# Patient Record
Sex: FEMALE | Race: BLACK OR AFRICAN AMERICAN | NOT HISPANIC OR LATINO | Employment: OTHER | URBAN - METROPOLITAN AREA
[De-identification: names, ages, dates, MRNs, and addresses within clinical notes are randomized per-mention and may not be internally consistent; named-entity substitution may affect disease eponyms.]

---

## 2017-09-13 ENCOUNTER — OFFICE VISIT (OUTPATIENT)
Dept: FAMILY MEDICINE | Facility: CLINIC | Age: 67
End: 2017-09-13
Payer: MEDICARE

## 2017-09-13 VITALS
OXYGEN SATURATION: 98 % | WEIGHT: 152.75 LBS | RESPIRATION RATE: 18 BRPM | DIASTOLIC BLOOD PRESSURE: 62 MMHG | HEART RATE: 52 BPM | SYSTOLIC BLOOD PRESSURE: 132 MMHG | TEMPERATURE: 99 F | BODY MASS INDEX: 24.55 KG/M2 | HEIGHT: 66 IN

## 2017-09-13 DIAGNOSIS — N95.9 MENOPAUSAL AND PERIMENOPAUSAL DISORDER: ICD-10-CM

## 2017-09-13 DIAGNOSIS — M17.0 OSTEOARTHRITIS OF BOTH KNEES, UNSPECIFIED OSTEOARTHRITIS TYPE: ICD-10-CM

## 2017-09-13 DIAGNOSIS — Z85.038 HISTORY OF COLON CANCER: ICD-10-CM

## 2017-09-13 DIAGNOSIS — I10 ESSENTIAL HYPERTENSION: ICD-10-CM

## 2017-09-13 DIAGNOSIS — Z00.00 ANNUAL PHYSICAL EXAM: Primary | ICD-10-CM

## 2017-09-13 PROCEDURE — 99214 OFFICE O/P EST MOD 30 MIN: CPT | Mod: PBBFAC,PN | Performed by: FAMILY MEDICINE

## 2017-09-13 PROCEDURE — 3075F SYST BP GE 130 - 139MM HG: CPT | Mod: ,,, | Performed by: FAMILY MEDICINE

## 2017-09-13 PROCEDURE — 3078F DIAST BP <80 MM HG: CPT | Mod: ,,, | Performed by: FAMILY MEDICINE

## 2017-09-13 PROCEDURE — 99999 PR PBB SHADOW E&M-EST. PATIENT-LVL IV: CPT | Mod: PBBFAC,,, | Performed by: FAMILY MEDICINE

## 2017-09-13 PROCEDURE — 99205 OFFICE O/P NEW HI 60 MIN: CPT | Mod: S$PBB,,, | Performed by: FAMILY MEDICINE

## 2017-09-13 RX ORDER — GABAPENTIN 100 MG/1
100 CAPSULE ORAL 2 TIMES DAILY
COMMUNITY
End: 2018-02-22 | Stop reason: SDUPTHER

## 2017-09-13 RX ORDER — TRAMADOL HYDROCHLORIDE 50 MG/1
50 TABLET ORAL EVERY 6 HOURS PRN
Status: ON HOLD | COMMUNITY
End: 2018-01-08 | Stop reason: HOSPADM

## 2017-09-13 RX ORDER — ONDANSETRON HYDROCHLORIDE 8 MG/1
TABLET, FILM COATED ORAL EVERY 8 HOURS PRN
COMMUNITY
End: 2018-01-18

## 2017-09-13 RX ORDER — TALC
POWDER (GRAM) TOPICAL DAILY
Status: ON HOLD | COMMUNITY
End: 2018-01-08 | Stop reason: HOSPADM

## 2017-09-13 RX ORDER — TRAZODONE HYDROCHLORIDE 50 MG/1
50 TABLET ORAL NIGHTLY PRN
COMMUNITY
End: 2018-01-18

## 2017-09-13 RX ORDER — ESCITALOPRAM OXALATE 10 MG/1
10 TABLET ORAL DAILY
COMMUNITY
End: 2017-10-12

## 2017-09-13 RX ORDER — NYSTATIN 100000 [USP'U]/ML
SUSPENSION ORAL 4 TIMES DAILY PRN
COMMUNITY
End: 2017-10-13 | Stop reason: SDUPTHER

## 2017-09-13 RX ORDER — PROMETHAZINE HYDROCHLORIDE 25 MG/1
25 TABLET ORAL EVERY 6 HOURS PRN
COMMUNITY
End: 2017-10-02

## 2017-09-13 RX ORDER — PANTOPRAZOLE SODIUM 40 MG/1
40 TABLET, DELAYED RELEASE ORAL DAILY
COMMUNITY
End: 2017-12-29

## 2017-09-13 RX ORDER — DOCUSATE SODIUM 100 MG/1
100 CAPSULE, LIQUID FILLED ORAL 2 TIMES DAILY PRN
Status: ON HOLD | COMMUNITY
End: 2018-01-08 | Stop reason: HOSPADM

## 2017-09-13 NOTE — PROGRESS NOTES
HPI:  Patito Domingo is a 67 y.o. year old female that  Presents to get established as a patient.  She has colon cancer in the first of the year. She also has had tumors found in her liver but does not remember what the oring of them were.She also was told she has kidney stones.She was put on medication that would break the stones up. She reports that she has difficulty with walking after a fall last year and has a walker but does not use it because it is so cumbersome. She needs a cane.Her previous PCP was Dr. Molly King .She was put on Lexapro recently but she has not started it yet.She stays with her son.  Chief Complaint   Patient presents with    [facility]    Knee Pain     pt fell a year ago and still feels buckling in knees    Depression     previous MD put her on lexapro hasn't started taking   .     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     Pyelonephritis, acute     Sec neuroend tumor-liver      Social History     Social History    Marital status:      Spouse name: N/A    Number of children: N/A    Years of education: N/A     Occupational History    disabled       Social History Main Topics    Smoking status: Never Smoker    Smokeless tobacco: Never Used    Alcohol use No    Drug use: No    Sexual activity: Not on file     Other Topics Concern    Not on file     Social History Narrative    No narrative on file     Past Surgical History:   Procedure Laterality Date    COLON SURGERY      cystoscope      HYSTERECTOMY  5/1996    LITHOTRIPSY      LIVER BIOPSY  9/14    carcinoid     Family History   Problem Relation Age of Onset    Cancer Mother      unknown    Alzheimer's disease Father     Stroke Sister     No Known Problems Son     No Known Problems Son     No Known Problems Son     No Known Problems Son   "          Review of Systems  General ROS: negative for chills, fever or weight loss  Psychological ROS: negative for hallucination, depression or suicidal ideation  Ophthalmic ROS: negative for blurry vision, photophobia or eye pain  ENT ROS: negative for epistaxis, sore throat or rhinorrhea  Respiratory ROS: no cough, shortness of breath, or wheezing  Cardiovascular ROS: no chest pain or dyspnea on exertion  Gastrointestinal ROS: no abdominal pain, change in bowel habits, or black/ bloody stools  Genito-Urinary ROS: no dysuria, trouble voiding, or hematuria  Musculoskeletal ROS: negative for gait disturbance or muscular weakness  Neurological ROS: no syncope or seizures; no ataxia  Dermatological ROS: negative for pruritis, rash and jaundice      Physical Exam:  /62 (BP Location: Left arm, Patient Position: Sitting, BP Method: Medium (Manual))   Pulse (!) 52   Temp 98.5 °F (36.9 °C) (Oral)   Resp 18   Ht 5' 6" (1.676 m)   Wt 69.3 kg (152 lb 12.5 oz)   SpO2 98%   BMI 24.66 kg/m²   General appearance: alert, cooperative, no distress  Constitutional:Oriented to person, place, and time.appears well-developed and well-nourished.  HEENT: Normocephalic, atraumatic, neck symmetrical, no nasal discharge, TM - clear bilaterally , thick saliva present  Eyes: conjunctivae/corneas clear, PERRL, EOM's intact  Lungs: clear to auscultation bilaterally, no dullness to percussion bilaterally  Heart: regular rate and rhythm without rub; no displacement of the PMI   Abdomen: soft, non-tender; bowel sounds normoactive; no organomegaly  Extremities: extremities symmetric; no clubbing, cyanosis, or edema  Integument: Skin color, texture, turgor normal; no rashes; mid abdominal surgical scar  Neurologic: Alert and oriented X 3, normal strength, normal coordination and gait, dizzy upon reclining  Psychiatric: no pressured speech; normal affect; no evidence of impaired cognition , tearful   Physical Exam  LABS:    Complete " Blood Count  Lab Results   Component Value Date    RBC 3.78 (L) 05/23/2017    HGB 10.9 (L) 05/23/2017    HCT 33.7 (L) 05/23/2017    MCV 89 05/23/2017    MCH 28.8 05/23/2017    MCHC 32.3 05/23/2017    RDW 13.0 05/23/2017     05/23/2017    MPV 10.4 05/23/2017    GRAN 2.8 05/23/2017    GRAN 72.9 05/23/2017    LYMPH 0.7 (L) 05/23/2017    LYMPH 17.2 (L) 05/23/2017    MONO 0.4 05/23/2017    MONO 9.1 05/23/2017    EOS 0.0 05/23/2017    BASO 0.02 05/23/2017    EOSINOPHIL 0.3 05/23/2017    BASOPHIL 0.5 05/23/2017    DIFFMETHOD Automated 05/23/2017       Comprehensive Metabolic Panel  Lab Results   Component Value Date     (H) 05/23/2017    BUN 13 05/23/2017    CREATININE 0.90 05/23/2017     05/23/2017    K 3.6 05/23/2017     05/23/2017    PROT 7.6 05/23/2017    ALBUMIN 4.0 05/23/2017    BILITOT 0.9 05/23/2017    AST 14 (L) 05/23/2017    ALKPHOS 84 05/23/2017    CO2 30 (H) 05/23/2017    ALT 14 05/23/2017    ANIONGAP 9 05/23/2017    EGFRNONAA >60.0 05/23/2017    ESTGFRAFRICA >60.0 05/23/2017       LIPID  Lab Results   Component Value Date    CHOL 143 08/30/2011    HDL 59 08/30/2011       TSH  Lab Results   Component Value Date    TSH 0.896 03/14/2016       Current Outpatient Prescriptions   Medication Sig Dispense Refill    cyanocobalamin 1,000 mcg/mL injection 1,000 mcg every 30 days.   0    docusate sodium (COLACE) 100 MG capsule Take 100 mg by mouth 2 (two) times daily.      ergocalciferol (VITAMIN D2) 50,000 unit Cap Take 1 capsule (50,000 Units total) by mouth every 7 days. 12 capsule 4    gabapentin (NEURONTIN) 100 MG capsule Take 100 mg by mouth 2 (two) times daily.      LOPERAMIDE HCL (IMODIUM A-D ORAL) Take by mouth.      losartan (COZAAR) 100 MG tablet 50 mg once daily.   3    nystatin (MYCOSTATIN) 100,000 unit/mL suspension Take by mouth 4 (four) times daily.      ondansetron (ZOFRAN) 8 MG tablet Take by mouth every 8 (eight) hours as needed for Nausea.      pantoprazole (PROTONIX)  40 MG tablet Take 40 mg by mouth once daily.      potassium citrate (UROCIT-K) 10 mEq (1,080 mg) TbSR Take 10 mEq by mouth every 12 (twelve) hours.  0    promethazine (PHENERGAN) 25 MG tablet Take 25 mg by mouth every 6 (six) hours as needed for Nausea.      trazodone (DESYREL) 50 MG tablet Take 50 mg by mouth every evening.      escitalopram oxalate (LEXAPRO) 10 MG tablet Take 10 mg by mouth once daily.      magnesium oxide (MAG-OX) 250 mg Tab Take by mouth once.      metoprolol tartrate (LOPRESSOR) 50 MG tablet Take 1 tablet (50 mg total) by mouth 2 (two) times daily. 60 tablet 0    tramadol (ULTRAM) 50 mg tablet Take 50 mg by mouth every 6 (six) hours as needed for Pain.       No current facility-administered medications for this visit.        Assessment:    ICD-10-CM ICD-9-CM    1. Annual physical exam Z00.00 V70.0 DXA Bone Density Spine And Hip      Lipid panel      Comprehensive metabolic panel      CBC auto differential      TSH      Hepatitis C antibody   2. Essential hypertension I10 401.9 Lipid panel      Comprehensive metabolic panel      CBC auto differential      TSH   3. Osteoarthritis of both knees, unspecified osteoarthritis type M17.0 715.96 CANE FOR HOME USE      DXA Bone Density Spine And Hip   4. History of colon cancer Z85.038 V10.05    5. Menopausal and perimenopausal disorder  N95.9 627.9 DXA Bone Density Spine And Hip         Plan:  Will get release of information fill out to get old records before getting lab work done.   Return in 2 weeks (on 9/27/2017).          Magdalena Hernandez MD

## 2017-09-15 ENCOUNTER — TELEPHONE (OUTPATIENT)
Dept: FAMILY MEDICINE | Facility: CLINIC | Age: 67
End: 2017-09-15

## 2017-09-15 DIAGNOSIS — L30.9 DERMATITIS: Primary | ICD-10-CM

## 2017-09-15 RX ORDER — TRIAMCINOLONE ACETONIDE 1 MG/G
OINTMENT TOPICAL 2 TIMES DAILY
Qty: 30 G | Refills: 0 | Status: SHIPPED | OUTPATIENT
Start: 2017-09-15 | End: 2017-10-02

## 2017-09-15 NOTE — TELEPHONE ENCOUNTER
----- Message from Amy Byrd sent at 9/15/2017 11:50 AM CDT -----  Contact: Self [phone]  Patient is calling to see if her medication was sent to the pharmacy.please advice

## 2017-10-04 PROBLEM — H25.042 POSTERIOR SUBCAPSULAR POLAR AGE-RELATED CATARACT OF LEFT EYE: Status: RESOLVED | Noted: 2017-10-04 | Resolved: 2017-10-04

## 2017-10-04 PROBLEM — H25.042 POSTERIOR SUBCAPSULAR POLAR AGE-RELATED CATARACT OF LEFT EYE: Status: ACTIVE | Noted: 2017-10-04

## 2017-10-12 ENCOUNTER — OFFICE VISIT (OUTPATIENT)
Dept: FAMILY MEDICINE | Facility: CLINIC | Age: 67
End: 2017-10-12
Payer: MEDICARE

## 2017-10-12 VITALS
WEIGHT: 155.19 LBS | HEART RATE: 52 BPM | TEMPERATURE: 99 F | SYSTOLIC BLOOD PRESSURE: 122 MMHG | DIASTOLIC BLOOD PRESSURE: 64 MMHG | HEIGHT: 66 IN | RESPIRATION RATE: 20 BRPM | OXYGEN SATURATION: 99 % | BODY MASS INDEX: 24.94 KG/M2

## 2017-10-12 DIAGNOSIS — Z00.00 ANNUAL PHYSICAL EXAM: ICD-10-CM

## 2017-10-12 DIAGNOSIS — I10 HYPERTENSION, UNSPECIFIED TYPE: ICD-10-CM

## 2017-10-12 DIAGNOSIS — Z12.39 BREAST CANCER SCREENING: Primary | ICD-10-CM

## 2017-10-12 DIAGNOSIS — E55.9 VITAMIN D DEFICIENCY: ICD-10-CM

## 2017-10-12 DIAGNOSIS — Z23 NEED FOR VIRAL IMMUNIZATION: ICD-10-CM

## 2017-10-12 DIAGNOSIS — Z23 VACCINE FOR STREPTOCOCCUS PNEUMONIAE AND INFLUENZA: ICD-10-CM

## 2017-10-12 DIAGNOSIS — Z23 NEED FOR INFLUENZA VACCINATION: ICD-10-CM

## 2017-10-12 DIAGNOSIS — E53.8 VITAMIN B 12 DEFICIENCY: ICD-10-CM

## 2017-10-12 PROCEDURE — G0008 ADMIN INFLUENZA VIRUS VAC: HCPCS | Mod: PBBFAC,PN

## 2017-10-12 PROCEDURE — 99213 OFFICE O/P EST LOW 20 MIN: CPT | Mod: PBBFAC,PN | Performed by: FAMILY MEDICINE

## 2017-10-12 PROCEDURE — 99999 PR PBB SHADOW E&M-EST. PATIENT-LVL III: CPT | Mod: PBBFAC,,, | Performed by: FAMILY MEDICINE

## 2017-10-12 PROCEDURE — 90670 PCV13 VACCINE IM: CPT | Mod: PBBFAC,PN

## 2017-10-12 PROCEDURE — G0009 ADMIN PNEUMOCOCCAL VACCINE: HCPCS | Mod: PBBFAC,PN

## 2017-10-12 PROCEDURE — 99214 OFFICE O/P EST MOD 30 MIN: CPT | Mod: 25,S$PBB,, | Performed by: FAMILY MEDICINE

## 2017-10-12 RX ORDER — OXYCODONE HYDROCHLORIDE 30 MG/1
TABLET ORAL
Status: ON HOLD | COMMUNITY
Start: 2017-09-27 | End: 2018-01-08 | Stop reason: HOSPADM

## 2017-10-12 RX ORDER — METOPROLOL TARTRATE 50 MG/1
50 TABLET ORAL 2 TIMES DAILY
COMMUNITY
End: 2018-03-22 | Stop reason: SDUPTHER

## 2017-10-12 RX ORDER — LOSARTAN POTASSIUM 50 MG/1
TABLET ORAL DAILY
COMMUNITY
Start: 2017-09-17 | End: 2018-03-08 | Stop reason: SDUPTHER

## 2017-10-12 RX ORDER — NYSTATIN 100000 [USP'U]/ML
SUSPENSION ORAL
Qty: 120 ML | Refills: 0 | Status: CANCELLED | OUTPATIENT
Start: 2017-10-12

## 2017-10-12 RX ORDER — POTASSIUM CITRATE 15 MEQ/1
TABLET, EXTENDED RELEASE ORAL
COMMUNITY
Start: 2017-09-06 | End: 2017-12-29

## 2017-10-12 RX ORDER — POLYETHYLENE GLYCOL 3350 17 G/17G
POWDER, FOR SOLUTION ORAL
Status: ON HOLD | COMMUNITY
Start: 2017-08-22 | End: 2018-01-08 | Stop reason: HOSPADM

## 2017-10-12 RX ORDER — CIPROFLOXACIN 500 MG/1
TABLET ORAL
COMMUNITY
Start: 2017-09-22 | End: 2017-10-12

## 2017-10-12 RX ORDER — TRIAMCINOLONE ACETONIDE 1 MG/G
CREAM TOPICAL 2 TIMES DAILY
COMMUNITY
End: 2018-01-18

## 2017-10-12 RX ORDER — LIFITEGRAST 50 MG/ML
SOLUTION/ DROPS OPHTHALMIC
Status: ON HOLD | COMMUNITY
Start: 2017-07-27 | End: 2018-04-29 | Stop reason: HOSPADM

## 2017-10-12 RX ORDER — POLYMYXIN B SULFATE AND TRIMETHOPRIM 1; 10000 MG/ML; [USP'U]/ML
SOLUTION OPHTHALMIC
COMMUNITY
Start: 2017-07-27 | End: 2017-10-12

## 2017-10-12 RX ORDER — MORPHINE SULFATE 15 MG/1
TABLET, FILM COATED, EXTENDED RELEASE ORAL
COMMUNITY
Start: 2017-07-05 | End: 2017-10-12

## 2017-10-12 RX ORDER — LEVOFLOXACIN 500 MG/1
TABLET, FILM COATED ORAL
COMMUNITY
Start: 2017-08-22 | End: 2017-10-12

## 2017-10-13 RX ORDER — NYSTATIN 100000 [USP'U]/ML
6 SUSPENSION ORAL 4 TIMES DAILY PRN
Qty: 60 ML | Refills: 0 | Status: ON HOLD | OUTPATIENT
Start: 2017-10-13 | End: 2018-01-08 | Stop reason: HOSPADM

## 2017-10-13 NOTE — TELEPHONE ENCOUNTER
----- Message from Maria Eugenia Davis sent at 10/13/2017  2:39 PM CDT -----  Contact: [phone] self  Patient advised need Rx for Nystatin sent to [facility] in [place]. Patient advised she has been waiting since yesterday for the Rx. Please advise.

## 2017-10-13 NOTE — TELEPHONE ENCOUNTER
----- Message from Ariane Daly sent at 10/13/2017  4:10 PM CDT -----  Contact: [phone]  Patient states she is still waiting for her nystatin that was requested twice

## 2017-10-13 NOTE — TELEPHONE ENCOUNTER
----- Message from Stephanie Diehl sent at 10/13/2017 10:57 AM CDT -----  Contact: [phone]/self  Pt states this is her second call.  Pt requesting a refill for nystatin (MYCOSTATIN) 100,000 unit/mL suspension.   Please advise

## 2017-10-16 NOTE — PROGRESS NOTES
HPI:  Patito Domingo is a 67 y.o. year old female that  presents for follow-up of bone density study.  She has recently had cataract surgery and is doing well, but is still a little bit of pain.  Patient needs refill on nystatin for oral thrush.  Chief Complaint   Patient presents with    Follow-up     bone density results--pt recently surgery on cataracts in left eye    Medication Refill     nystatin   .     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)      Social History     Social History    Marital status:      Spouse name: N/A    Number of children: N/A    Years of education: N/A     Occupational History    disabled       Social History Main Topics    Smoking status: Never Smoker    Smokeless tobacco: Never Used    Alcohol use No    Drug use: No    Sexual activity: Not on file     Other Topics Concern    Not on file     Social History Narrative    No narrative on file     Past Surgical History:   Procedure Laterality Date    COLON SURGERY      cystoscope      HYSTERECTOMY  5/1996    LITHOTRIPSY      LIVER BIOPSY  9/14    carcinoid     Family History   Problem Relation Age of Onset    Cancer Mother      unknown    Alzheimer's disease Father     Stroke Sister     No Known Problems Son     No Known Problems Son     No Known Problems Son     No Known Problems Son            Review of Systems  General ROS: negative for chills, fever or weight loss  ENT ROS: negative for epistaxis, sore throat or rhinorrhea  Respiratory ROS: no cough, shortness of breath, or wheezing  Cardiovascular ROS: no chest pain or dyspnea on exertion  Gastrointestinal ROS: no abdominal pain, change in bowel habits, or black/ bloody stools    Physical Exam:  /64 (BP Location: Right  "arm, Patient Position: Sitting, BP Method: Medium (Manual))   Pulse (!) 52   Temp 98.5 °F (36.9 °C) (Oral)   Resp 20   Ht 5' 6" (1.676 m)   Wt 70.4 kg (155 lb 3.3 oz)   SpO2 99%   BMI 25.05 kg/m²   General appearance: alert, cooperative, no distress  Constitutional:Oriented to person, place, and time.appears well-developed and well-nourished.  Lungs: clear to auscultation bilaterally, no dullness to percussion bilaterally  Heart: regular rate and rhythm without rub; no displacement of the PMI , S1&S2 present  Physical Exam    LABS:    Complete Blood Count  Lab Results   Component Value Date    RBC 3.97 (L) 09/24/2017    HGB 10.7 (L) 09/24/2017    HCT 34.0 (L) 09/24/2017    MCV 86 09/24/2017    MCH 27.0 09/24/2017    MCHC 31.5 (L) 09/24/2017    RDW 13.1 09/24/2017     09/24/2017    MPV 10.2 09/24/2017    GRAN 2.2 09/24/2017    GRAN 68.8 09/24/2017    LYMPH 0.7 (L) 09/24/2017    LYMPH 21.5 09/24/2017    MONO 0.3 09/24/2017    MONO 9.1 09/24/2017    EOS 0.0 09/24/2017    BASO 0.01 09/24/2017    EOSINOPHIL 0.3 09/24/2017    BASOPHIL 0.3 09/24/2017    DIFFMETHOD Automated 09/24/2017       Comprehensive Metabolic Panel  Lab Results   Component Value Date     09/24/2017    BUN 13 09/24/2017    CREATININE 0.86 09/24/2017     09/24/2017    K 3.8 09/24/2017    CL 99 09/24/2017    PROT 7.3 09/24/2017    ALBUMIN 4.0 09/24/2017    BILITOT 0.7 09/24/2017    AST 17 09/24/2017    ALKPHOS 82 09/24/2017    CO2 31 (H) 09/24/2017    ALT 24 09/24/2017    ANIONGAP 10 09/24/2017    EGFRNONAA >60.0 09/24/2017    ESTGFRAFRICA >60.0 09/24/2017       LIPID  Lab Results   Component Value Date    CHOL 143 08/30/2011    HDL 59 08/30/2011         TSH  Lab Results   Component Value Date    TSH 0.896 03/14/2016       Current Outpatient Prescriptions   Medication Sig Dispense Refill    cyanocobalamin 1,000 mcg/mL injection 1,000 mcg every 30 days.   0    docusate sodium (COLACE) 100 MG capsule Take 100 mg by mouth 2 " (two) times daily as needed.       ergocalciferol (VITAMIN D2) 50,000 unit Cap Take 1 capsule (50,000 Units total) by mouth every 7 days. 12 capsule 4    gabapentin (NEURONTIN) 100 MG capsule Take 100 mg by mouth 2 (two) times daily.      LOPERAMIDE HCL (IMODIUM A-D ORAL) Take by mouth daily as needed.       losartan (COZAAR) 50 MG tablet       magnesium oxide (MAG-OX) 250 mg Tab Take by mouth once daily.       metoprolol tartrate (LOPRESSOR) 50 MG tablet Take 50 mg by mouth 2 (two) times daily.      NEPAFENAC (ILEVRO OPHT) Apply 1 drop to eye once daily.       ondansetron (ZOFRAN) 8 MG tablet Take by mouth every 8 (eight) hours as needed for Nausea.      oxycodone (ROXICODONE) 30 MG Tab       pantoprazole (PROTONIX) 40 MG tablet Take 40 mg by mouth once daily.      polyethylene glycol (GLYCOLAX) 17 gram/dose powder       POLYMYXIN B SULF/TRIMETHOPRIM (POLYTRIM OPHT) Apply 1 drop to eye 4 (four) times daily. Lt eye.      potassium citrate 15 mEq TbSR       prednisoLONE acetate (PRED FORTE) 1 % DrpS 1 drop 4 (four) times daily. Lt eye.      tramadol (ULTRAM) 50 mg tablet Take 50 mg by mouth every 6 (six) hours as needed for Pain.      trazodone (DESYREL) 50 MG tablet Take 50 mg by mouth nightly as needed.       triamcinolone acetonide 0.1% (KENALOG) 0.1 % cream Apply topically 2 (two) times daily.      XIIDRA 5 % Dpet       nystatin (MYCOSTATIN) 100,000 unit/mL suspension Take 6 mLs (600,000 Units total) by mouth 4 (four) times daily as needed. 60 mL 0     No current facility-administered medications for this visit.        Assessment:    ICD-10-CM ICD-9-CM    1. Breast cancer screening Z12.31 V76.10 Mammo Digital Screening Bilateral With CAD   2. Annual physical exam Z00.00 V70.0 Lipid panel      Hepatitis C antibody   3. Need for viral immunization Z23 V04.89 CANCELED: Influenza - Quadrivalent (3 years & older) (PF)   4. Hypertension, unspecified type I10 401.9 Lipid panel   5. Vitamin B 12  deficiency E53.8 266.2 Vitamin B12   6. Vaccine for streptococcus pneumoniae and influenza Z23 V06.6 Pneumococcal Conjugate Vaccine (13 Valent) (IM)   7. Vitamin D deficiency E55.9 268.9 Vitamin D   8. Need for influenza vaccination Z23 V04.81 Influenza - High Dose (65+) (PF) (IM)         Plan:  DEXA scan: Normal  Return in 3 months (on 1/12/2018).          Magdalena Hernandez MD

## 2017-11-20 ENCOUNTER — OFFICE VISIT (OUTPATIENT)
Dept: FAMILY MEDICINE | Facility: CLINIC | Age: 67
End: 2017-11-20
Payer: MEDICAID

## 2017-11-20 VITALS
BODY MASS INDEX: 24.96 KG/M2 | RESPIRATION RATE: 18 BRPM | SYSTOLIC BLOOD PRESSURE: 108 MMHG | DIASTOLIC BLOOD PRESSURE: 62 MMHG | WEIGHT: 155.31 LBS | HEART RATE: 51 BPM | HEIGHT: 66 IN | TEMPERATURE: 98 F | OXYGEN SATURATION: 99 %

## 2017-11-20 DIAGNOSIS — D50.9 IRON DEFICIENCY ANEMIA, UNSPECIFIED IRON DEFICIENCY ANEMIA TYPE: Primary | ICD-10-CM

## 2017-11-20 PROCEDURE — 99213 OFFICE O/P EST LOW 20 MIN: CPT | Mod: PBBFAC,PN | Performed by: FAMILY MEDICINE

## 2017-11-20 PROCEDURE — 99214 OFFICE O/P EST MOD 30 MIN: CPT | Mod: S$PBB,,, | Performed by: FAMILY MEDICINE

## 2017-11-20 PROCEDURE — 99999 PR PBB SHADOW E&M-EST. PATIENT-LVL III: CPT | Mod: PBBFAC,,, | Performed by: FAMILY MEDICINE

## 2017-11-20 RX ORDER — FERROUS GLUCONATE 324(38)MG
324 TABLET ORAL
COMMUNITY
Start: 2017-11-20 | End: 2017-12-17 | Stop reason: SDUPTHER

## 2017-11-20 RX ORDER — CIPROFLOXACIN 500 MG/1
TABLET ORAL
COMMUNITY
Start: 2017-10-31 | End: 2017-11-20

## 2017-11-20 NOTE — PROGRESS NOTES
"HPI:  Patito Domingo is a 67 y.o. year old female that  presents for lab results. She denies any new symptoms.  Chief Complaint   Patient presents with    Follow-up     lab results   .     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)      Social History     Social History    Marital status:      Spouse name: N/A    Number of children: N/A    Years of education: N/A     Occupational History    disabled       Social History Main Topics    Smoking status: Never Smoker    Smokeless tobacco: Never Used    Alcohol use No    Drug use: No    Sexual activity: Not on file     Other Topics Concern    Not on file     Social History Narrative    No narrative on file     Past Surgical History:   Procedure Laterality Date    COLON SURGERY      cystoscope      HYSTERECTOMY  5/1996    LITHOTRIPSY      LIVER BIOPSY  9/14    carcinoid     Family History   Problem Relation Age of Onset    Cancer Mother      unknown    Alzheimer's disease Father     Stroke Sister     No Known Problems Son     No Known Problems Son     No Known Problems Son     No Known Problems Son            Review of Systems  General ROS: negative for chills, fever or weight loss  ENT ROS: negative for epistaxis, sore throat or rhinorrhea  Respiratory ROS: no cough, shortness of breath, or wheezing  Cardiovascular ROS: no chest pain or dyspnea on exertion  Gastrointestinal ROS: no abdominal pain, change in bowel habits, or black/ bloody stools    Physical Exam:  /62 (BP Location: Left arm, Patient Position: Sitting, BP Method: Medium (Manual))   Pulse (!) 51   Temp 98.3 °F (36.8 °C) (Oral)   Resp 18   Ht 5' 6" (1.676 m)   Wt 70.5 kg (155 lb 5 oz)   SpO2 99%   BMI 25.07 kg/m²   General appearance: " alert, cooperative, no distress  Constitutional:Oriented to person, place, and time.appears well-developed and well-nourished.  Lungs: clear to auscultation bilaterally, no dullness to percussion bilaterally  Heart: regular rate and rhythm without rub; no displacement of the PMI , S1&S2 present    Physical Exam    LABS:    Complete Blood Count  Lab Results   Component Value Date    RBC 3.97 (L) 09/24/2017    HGB 10.7 (L) 09/24/2017    HCT 34.0 (L) 09/24/2017    MCV 86 09/24/2017    MCH 27.0 09/24/2017    MCHC 31.5 (L) 09/24/2017    RDW 13.1 09/24/2017     09/24/2017    MPV 10.2 09/24/2017    GRAN 2.2 09/24/2017    GRAN 68.8 09/24/2017    LYMPH 0.7 (L) 09/24/2017    LYMPH 21.5 09/24/2017    MONO 0.3 09/24/2017    MONO 9.1 09/24/2017    EOS 0.0 09/24/2017    BASO 0.01 09/24/2017    EOSINOPHIL 0.3 09/24/2017    BASOPHIL 0.3 09/24/2017    DIFFMETHOD Automated 09/24/2017       Comprehensive Metabolic Panel  Lab Results   Component Value Date     09/24/2017    BUN 13 09/24/2017    CREATININE 0.86 09/24/2017     09/24/2017    K 3.8 09/24/2017    CL 99 09/24/2017    PROT 7.3 09/24/2017    ALBUMIN 4.0 09/24/2017    BILITOT 0.7 09/24/2017    AST 17 09/24/2017    ALKPHOS 82 09/24/2017    CO2 31 (H) 09/24/2017    ALT 24 09/24/2017    ANIONGAP 10 09/24/2017    EGFRNONAA >60.0 09/24/2017    ESTGFRAFRICA >60.0 09/24/2017       LIPID  Lab Results   Component Value Date    CHOL 143 08/30/2011    HDL 59 08/30/2011         TSH  Lab Results   Component Value Date    TSH 0.896 03/14/2016       Current Outpatient Prescriptions   Medication Sig Dispense Refill    cyanocobalamin 1,000 mcg/mL injection 1,000 mcg every 30 days.   0    docusate sodium (COLACE) 100 MG capsule Take 100 mg by mouth 2 (two) times daily as needed.       ergocalciferol (VITAMIN D2) 50,000 unit Cap Take 1 capsule (50,000 Units total) by mouth every 7 days. 12 capsule 4    gabapentin (NEURONTIN) 100 MG capsule Take 100 mg by mouth 2 (two)  times daily.      LOPERAMIDE HCL (IMODIUM A-D ORAL) Take by mouth daily as needed.       losartan (COZAAR) 50 MG tablet       magnesium oxide (MAG-OX) 250 mg Tab Take by mouth once daily.       metoprolol tartrate (LOPRESSOR) 50 MG tablet Take 50 mg by mouth 2 (two) times daily.      NEPAFENAC (ILEVRO OPHT) Apply 1 drop to eye once daily.       nystatin (MYCOSTATIN) 100,000 unit/mL suspension Take 6 mLs (600,000 Units total) by mouth 4 (four) times daily as needed. 60 mL 0    ondansetron (ZOFRAN) 8 MG tablet Take by mouth every 8 (eight) hours as needed for Nausea.      oxycodone (ROXICODONE) 30 MG Tab       pantoprazole (PROTONIX) 40 MG tablet Take 40 mg by mouth once daily.      polyethylene glycol (GLYCOLAX) 17 gram/dose powder       POLYMYXIN B SULF/TRIMETHOPRIM (POLYTRIM OPHT) Apply 1 drop to eye 4 (four) times daily. Lt eye.      potassium citrate 15 mEq TbSR       prednisoLONE acetate (PRED FORTE) 1 % DrpS 1 drop 4 (four) times daily. Lt eye.      tramadol (ULTRAM) 50 mg tablet Take 50 mg by mouth every 6 (six) hours as needed for Pain.      trazodone (DESYREL) 50 MG tablet Take 50 mg by mouth nightly as needed.       triamcinolone acetonide 0.1% (KENALOG) 0.1 % cream Apply topically 2 (two) times daily.      XIIDRA 5 % Dpet       ferrous gluconate (FERGON) 324 MG tablet Take 1 tablet (324 mg total) by mouth daily with breakfast.       No current facility-administered medications for this visit.        Assessment:    ICD-10-CM ICD-9-CM    1. Iron deficiency anemia, unspecified iron deficiency anemia type D50.9 280.9 ferrous gluconate (FERGON) 324 MG tablet         Plan:  We will for anemia for her next visit.  Return in 2 months (on 1/23/2018).          Magdalena Hernandez MD

## 2017-11-21 ENCOUNTER — TELEPHONE (OUTPATIENT)
Dept: FAMILY MEDICINE | Facility: CLINIC | Age: 67
End: 2017-11-21

## 2017-11-21 NOTE — TELEPHONE ENCOUNTER
----- Message from Stephanie Diehl sent at 11/21/2017  2:16 PM CST -----  Contact: [phone]/self  Pt requesting to speak with you concerning a prescription for a walker.  Please call and advise

## 2017-12-17 DIAGNOSIS — D50.9 IRON DEFICIENCY ANEMIA, UNSPECIFIED IRON DEFICIENCY ANEMIA TYPE: ICD-10-CM

## 2017-12-17 RX ORDER — FERROUS GLUCONATE 324(38)MG
TABLET ORAL
Qty: 30 TABLET | Refills: 2 | Status: SHIPPED | OUTPATIENT
Start: 2017-12-17 | End: 2018-03-09 | Stop reason: SDUPTHER

## 2017-12-29 ENCOUNTER — HOSPITAL ENCOUNTER (OUTPATIENT)
Facility: HOSPITAL | Age: 67
End: 2017-12-29
Attending: EMERGENCY MEDICINE
Payer: MEDICARE

## 2017-12-29 ENCOUNTER — HOSPITAL ENCOUNTER (INPATIENT)
Facility: HOSPITAL | Age: 67
LOS: 4 days | Discharge: HOME | DRG: 414 | End: 2018-01-02
Attending: SURGERY | Admitting: SURGERY
Payer: MEDICARE

## 2017-12-29 VITALS
BODY MASS INDEX: 24.91 KG/M2 | TEMPERATURE: 98 F | OXYGEN SATURATION: 100 % | WEIGHT: 155 LBS | HEART RATE: 70 BPM | DIASTOLIC BLOOD PRESSURE: 75 MMHG | HEIGHT: 66 IN | SYSTOLIC BLOOD PRESSURE: 167 MMHG | RESPIRATION RATE: 18 BRPM

## 2017-12-29 DIAGNOSIS — K81.9 CHOLECYSTITIS: Primary | ICD-10-CM

## 2017-12-29 DIAGNOSIS — C7A.8 NEUROENDOCRINE CARCINOMA, UNKNOWN PRIMARY SITE: ICD-10-CM

## 2017-12-29 DIAGNOSIS — C78.7 LIVER METASTASIS: ICD-10-CM

## 2017-12-29 DIAGNOSIS — K85.10 ACUTE BILIARY PANCREATITIS WITHOUT INFECTION OR NECROSIS: Primary | ICD-10-CM

## 2017-12-29 PROBLEM — K85.90 PANCREATITIS DUE TO BILIARY OBSTRUCTION: Status: ACTIVE | Noted: 2017-12-29

## 2017-12-29 PROBLEM — K83.1 PANCREATITIS DUE TO BILIARY OBSTRUCTION: Status: ACTIVE | Noted: 2017-12-29

## 2017-12-29 PROCEDURE — 36415 COLL VENOUS BLD VENIPUNCTURE: CPT

## 2017-12-29 PROCEDURE — 63600175 PHARM REV CODE 636 W HCPCS: Performed by: SURGERY

## 2017-12-29 PROCEDURE — 99221 1ST HOSP IP/OBS SF/LOW 40: CPT | Mod: ,,, | Performed by: SURGERY

## 2017-12-29 PROCEDURE — G0378 HOSPITAL OBSERVATION PER HR: HCPCS

## 2017-12-29 PROCEDURE — 96374 THER/PROPH/DIAG INJ IV PUSH: CPT

## 2017-12-29 PROCEDURE — 63600175 PHARM REV CODE 636 W HCPCS: Performed by: FAMILY MEDICINE

## 2017-12-29 PROCEDURE — 11000001 HC ACUTE MED/SURG PRIVATE ROOM

## 2017-12-29 PROCEDURE — 96375 TX/PRO/DX INJ NEW DRUG ADDON: CPT

## 2017-12-29 PROCEDURE — 63600175 PHARM REV CODE 636 W HCPCS: Performed by: EMERGENCY MEDICINE

## 2017-12-29 PROCEDURE — 25000003 PHARM REV CODE 250: Performed by: SURGERY

## 2017-12-29 PROCEDURE — 99285 EMERGENCY DEPT VISIT HI MDM: CPT

## 2017-12-29 PROCEDURE — 25000003 PHARM REV CODE 250: Performed by: FAMILY MEDICINE

## 2017-12-29 PROCEDURE — 87040 BLOOD CULTURE FOR BACTERIA: CPT | Mod: 59

## 2017-12-29 PROCEDURE — 94761 N-INVAS EAR/PLS OXIMETRY MLT: CPT

## 2017-12-29 PROCEDURE — 63600175 PHARM REV CODE 636 W HCPCS: Performed by: PHYSICIAN ASSISTANT

## 2017-12-29 RX ORDER — SODIUM CHLORIDE, SODIUM LACTATE, POTASSIUM CHLORIDE, CALCIUM CHLORIDE 600; 310; 30; 20 MG/100ML; MG/100ML; MG/100ML; MG/100ML
INJECTION, SOLUTION INTRAVENOUS CONTINUOUS
Status: DISCONTINUED | OUTPATIENT
Start: 2017-12-29 | End: 2017-12-29 | Stop reason: HOSPADM

## 2017-12-29 RX ORDER — HYDRALAZINE HYDROCHLORIDE 20 MG/ML
10 INJECTION INTRAMUSCULAR; INTRAVENOUS ONCE
Status: COMPLETED | OUTPATIENT
Start: 2017-12-29 | End: 2017-12-29

## 2017-12-29 RX ORDER — METOPROLOL TARTRATE 50 MG/1
50 TABLET ORAL 2 TIMES DAILY
Status: DISCONTINUED | OUTPATIENT
Start: 2017-12-29 | End: 2017-12-29 | Stop reason: HOSPADM

## 2017-12-29 RX ORDER — HYDRALAZINE HYDROCHLORIDE 20 MG/ML
10 INJECTION INTRAMUSCULAR; INTRAVENOUS EVERY 6 HOURS PRN
Status: DISCONTINUED | OUTPATIENT
Start: 2017-12-29 | End: 2017-12-29 | Stop reason: HOSPADM

## 2017-12-29 RX ORDER — DIPHENHYDRAMINE HYDROCHLORIDE 50 MG/ML
25 INJECTION INTRAMUSCULAR; INTRAVENOUS EVERY 4 HOURS PRN
Status: DISCONTINUED | OUTPATIENT
Start: 2017-12-30 | End: 2018-01-02 | Stop reason: HOSPADM

## 2017-12-29 RX ORDER — DIPHENHYDRAMINE HYDROCHLORIDE 50 MG/ML
25 INJECTION INTRAMUSCULAR; INTRAVENOUS EVERY 4 HOURS PRN
Status: DISCONTINUED | OUTPATIENT
Start: 2017-12-29 | End: 2017-12-29 | Stop reason: HOSPADM

## 2017-12-29 RX ORDER — HYDROMORPHONE HYDROCHLORIDE 2 MG/ML
0.5 INJECTION, SOLUTION INTRAMUSCULAR; INTRAVENOUS; SUBCUTANEOUS EVERY 6 HOURS PRN
Status: DISCONTINUED | OUTPATIENT
Start: 2017-12-29 | End: 2017-12-29

## 2017-12-29 RX ORDER — HYDROCODONE BITARTRATE AND ACETAMINOPHEN 5; 325 MG/1; MG/1
1 TABLET ORAL EVERY 4 HOURS PRN
Status: DISCONTINUED | OUTPATIENT
Start: 2017-12-29 | End: 2017-12-29 | Stop reason: HOSPADM

## 2017-12-29 RX ORDER — INSULIN ASPART 100 [IU]/ML
0-5 INJECTION, SOLUTION INTRAVENOUS; SUBCUTANEOUS
Status: DISCONTINUED | OUTPATIENT
Start: 2017-12-29 | End: 2018-01-02 | Stop reason: HOSPADM

## 2017-12-29 RX ORDER — HYDROMORPHONE HYDROCHLORIDE 1 MG/ML
1 INJECTION, SOLUTION INTRAMUSCULAR; INTRAVENOUS; SUBCUTANEOUS
Status: COMPLETED | OUTPATIENT
Start: 2017-12-29 | End: 2017-12-29

## 2017-12-29 RX ORDER — GLUCAGON 1 MG
1 KIT INJECTION
Status: DISCONTINUED | OUTPATIENT
Start: 2017-12-29 | End: 2018-01-02 | Stop reason: HOSPADM

## 2017-12-29 RX ORDER — ONDANSETRON 8 MG/1
8 TABLET, ORALLY DISINTEGRATING ORAL EVERY 8 HOURS PRN
Status: DISCONTINUED | OUTPATIENT
Start: 2017-12-29 | End: 2017-12-29 | Stop reason: HOSPADM

## 2017-12-29 RX ORDER — PROMETHAZINE HYDROCHLORIDE 25 MG/1
25 SUPPOSITORY RECTAL EVERY 6 HOURS PRN
Status: DISCONTINUED | OUTPATIENT
Start: 2017-12-29 | End: 2017-12-29 | Stop reason: HOSPADM

## 2017-12-29 RX ORDER — ACETAMINOPHEN 325 MG/1
650 TABLET ORAL EVERY 4 HOURS PRN
Status: DISCONTINUED | OUTPATIENT
Start: 2017-12-29 | End: 2017-12-29 | Stop reason: HOSPADM

## 2017-12-29 RX ORDER — IBUPROFEN 200 MG
16 TABLET ORAL
Status: DISCONTINUED | OUTPATIENT
Start: 2017-12-29 | End: 2018-01-02 | Stop reason: HOSPADM

## 2017-12-29 RX ORDER — HYDROMORPHONE HYDROCHLORIDE 2 MG/ML
0.5 INJECTION, SOLUTION INTRAMUSCULAR; INTRAVENOUS; SUBCUTANEOUS EVERY 6 HOURS PRN
Status: DISCONTINUED | OUTPATIENT
Start: 2017-12-29 | End: 2017-12-30

## 2017-12-29 RX ORDER — MORPHINE SULFATE 4 MG/ML
2 INJECTION, SOLUTION INTRAMUSCULAR; INTRAVENOUS
Status: DISCONTINUED | OUTPATIENT
Start: 2017-12-29 | End: 2017-12-29 | Stop reason: HOSPADM

## 2017-12-29 RX ORDER — TRAZODONE HYDROCHLORIDE 50 MG/1
50 TABLET ORAL NIGHTLY PRN
Status: DISCONTINUED | OUTPATIENT
Start: 2017-12-29 | End: 2017-12-29 | Stop reason: HOSPADM

## 2017-12-29 RX ORDER — IBUPROFEN 200 MG
24 TABLET ORAL
Status: DISCONTINUED | OUTPATIENT
Start: 2017-12-29 | End: 2018-01-02 | Stop reason: HOSPADM

## 2017-12-29 RX ORDER — PANTOPRAZOLE SODIUM 40 MG/1
40 TABLET, DELAYED RELEASE ORAL DAILY
Status: DISCONTINUED | OUTPATIENT
Start: 2017-12-29 | End: 2017-12-29 | Stop reason: HOSPADM

## 2017-12-29 RX ORDER — GABAPENTIN 100 MG/1
100 CAPSULE ORAL 2 TIMES DAILY PRN
Status: DISCONTINUED | OUTPATIENT
Start: 2017-12-29 | End: 2017-12-29 | Stop reason: HOSPADM

## 2017-12-29 RX ORDER — SODIUM CHLORIDE 0.9 % (FLUSH) 0.9 %
5 SYRINGE (ML) INJECTION
Status: DISCONTINUED | OUTPATIENT
Start: 2017-12-29 | End: 2018-01-02 | Stop reason: HOSPADM

## 2017-12-29 RX ORDER — HYDRALAZINE HYDROCHLORIDE 20 MG/ML
10 INJECTION INTRAMUSCULAR; INTRAVENOUS EVERY 4 HOURS PRN
Status: DISCONTINUED | OUTPATIENT
Start: 2017-12-29 | End: 2018-01-02 | Stop reason: HOSPADM

## 2017-12-29 RX ADMIN — METOPROLOL TARTRATE 50 MG: 50 TABLET ORAL at 11:12

## 2017-12-29 RX ADMIN — ONDANSETRON 8 MG: 8 TABLET, ORALLY DISINTEGRATING ORAL at 12:12

## 2017-12-29 RX ADMIN — HYDRALAZINE HYDROCHLORIDE 10 MG: 20 INJECTION INTRAMUSCULAR; INTRAVENOUS at 02:12

## 2017-12-29 RX ADMIN — HYDROMORPHONE HYDROCHLORIDE 0.5 MG: 2 INJECTION INTRAMUSCULAR; INTRAVENOUS; SUBCUTANEOUS at 09:12

## 2017-12-29 RX ADMIN — OCTREOTIDE ACETATE 250 MCG/HR: 1000 INJECTION, SOLUTION INTRAVENOUS; SUBCUTANEOUS at 09:12

## 2017-12-29 RX ADMIN — MORPHINE SULFATE 2 MG: 4 INJECTION, SOLUTION INTRAMUSCULAR; INTRAVENOUS at 12:12

## 2017-12-29 RX ADMIN — HYDRALAZINE HYDROCHLORIDE 10 MG: 20 INJECTION INTRAMUSCULAR; INTRAVENOUS at 10:12

## 2017-12-29 RX ADMIN — HYDROCODONE BITARTRATE AND ACETAMINOPHEN 1 TABLET: 5; 325 TABLET ORAL at 06:12

## 2017-12-29 RX ADMIN — PIPERACILLIN SODIUM AND TAZOBACTAM SODIUM 4.5 G: 4; .5 INJECTION, POWDER, LYOPHILIZED, FOR SOLUTION INTRAVENOUS at 09:12

## 2017-12-29 RX ADMIN — SODIUM CHLORIDE, SODIUM LACTATE, POTASSIUM CHLORIDE, AND CALCIUM CHLORIDE: .6; .31; .03; .02 INJECTION, SOLUTION INTRAVENOUS at 11:12

## 2017-12-29 RX ADMIN — PANTOPRAZOLE SODIUM 40 MG: 40 TABLET, DELAYED RELEASE ORAL at 11:12

## 2017-12-29 RX ADMIN — HYDROMORPHONE HYDROCHLORIDE 1 MG: 1 INJECTION, SOLUTION INTRAMUSCULAR; INTRAVENOUS; SUBCUTANEOUS at 02:12

## 2017-12-29 NOTE — ED NOTES
T/c to [facility] transfer center.  Confirmed with Nesha that [facility].  Waiting for room to be assigned.  Pt's family has been notified.

## 2017-12-29 NOTE — SUBJECTIVE & OBJECTIVE
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)        Past Surgical History:   Procedure Laterality Date    CATARACT EXTRACTION Left 10/2017    COLON SURGERY      cystoscope      EYE SURGERY      HYSTERECTOMY  5/1996    LITHOTRIPSY      LIVER BIOPSY  9/14    carcinoid       Review of patient's allergies indicates:   Allergen Reactions    Contrast media Hives, Itching and Swelling    Epinephrine      Carcinoid pt    Ibuprofen Hives, Itching and Swelling    Sulfa (sulfonamide antibiotics) Hives, Itching and Swelling     Family History     Problem Relation (Age of Onset)    Alzheimer's disease Father    Cancer Mother    No Known Problems Son, Son, Son, Son    Stroke Sister        Social History Main Topics    Smoking status: Never Smoker    Smokeless tobacco: Never Used    Alcohol use No    Drug use: No    Sexual activity: Not on file     Review of Systems   Constitutional: Negative for chills and fever.   Respiratory: Negative for chest tightness and shortness of breath.    Cardiovascular: Negative for chest pain.   Gastrointestinal: Positive for abdominal pain, nausea and vomiting. Negative for anal bleeding, blood in stool, constipation, diarrhea and rectal pain.   Genitourinary: Negative for dysuria and frequency.   Skin: Negative for color change and rash.   Psychiatric/Behavioral: Negative for agitation and confusion.     Objective:     Vital Signs (Most Recent):  Temp: 97.8 °F (36.6 °C) (12/29/17 0904)  Pulse: 61 (12/29/17 1302)  Resp: 18 (12/29/17 0904)  BP: (!) 200/88 (12/29/17 1302)  SpO2: 99 % (12/29/17 1302) Vital Signs (24h Range):  Temp:  [95.2 °F (35.1 °C)-97.9 °F (36.6 °C)] 97.8 °F (36.6 °C)  Pulse:  [47-75] 61  Resp:  [16-22] 18  SpO2:  [99 %-100 %] 99 %  BP: (121-219)/(50-95) 200/88      Weight: 70.3 kg (155 lb) (12/29/17 0904)  Body mass index is 25.02 kg/m².    No intake or output data in the 24 hours ending 12/29/17 1337    Lines/Drains/Airways     Peripheral Intravenous Line                 Peripheral IV - Single Lumen 12/29/17 0003 Right Antecubital less than 1 day                Physical Exam   Constitutional: She is oriented to person, place, and time. She appears well-developed. No distress.   Cardiovascular: Normal rate, regular rhythm and normal heart sounds.  Exam reveals no friction rub.    No murmur heard.  Pulmonary/Chest: Effort normal and breath sounds normal. No respiratory distress. She has no wheezes.   Abdominal: Soft. Bowel sounds are normal. There is tenderness in the right upper quadrant.   Neurological: She is alert and oriented to person, place, and time.   Psychiatric: She has a normal mood and affect. Her behavior is normal.       Significant Labs:  CBC:   Recent Labs  Lab 12/29/17  0003   WBC 11.02   HGB 10.8*   HCT 34.0*        CMP:   Recent Labs  Lab 12/29/17  0003   *   CALCIUM 9.7   ALBUMIN 4.0   PROT 7.6      K 3.2*   CO2 30*      BUN 18*   CREATININE 1.15   ALKPHOS 212*   ALT 36   AST 88*   BILITOT 1.6*     Coagulation: No results for input(s): PT, INR, APTT in the last 48 hours.  Lipase:   Recent Labs  Lab 12/29/17  0003   LIPASE >4000*       Significant Imaging:  Imaging results within the past 24 hours have been reviewed.

## 2017-12-29 NOTE — ASSESSMENT & PLAN NOTE
- Secondary to metastatic neuroendocrine tumors.   - Case discussed with both Dr. Troncoso and Dr. Petty. Recommendations to transfer patient to [facility] for biliary services.

## 2017-12-29 NOTE — ED NOTES
Pt resting in ER stretcher, aaox4, rr e/u, NAD noted. Pt remains on BP monitor with vss noted. Family at bedside.  Bed low and locked, call light in reach, side rails up x2. Pt verbalized understanding of status and POC including surgical consult; denies further needs. Will continue to monitor.

## 2017-12-29 NOTE — CONSULTS
[facility] -   Gastroenterology  Consult Note    Patient Name: Patito Domingo  MRN: [id number]  Admission Date: 12/29/2017  Hospital Length of Stay: 0 days  Code Status: Full Code   Attending Provider: Arley Starks MD   Consulting Provider: Mindy Rosas NP  Primary Care Physician: Magdalena Hernandez MD  Principal Problem:Pancreatitis due to biliary obstruction    Consult to Gastroenterology  Consult performed by: MINDY ROSAS  Consult ordered by: JEANSONNE, LOUIS O. IV        Subjective:     HPI:  Patient is a 68 yo AA female that presented to [facility]  For RUQ abdominal pain, nausea, and vomiting. Onset of symptoms began 1 month ago but were intermittent and not as severe. Symptoms worsened yesterday which prompted her to report to the ER. She has a history of metastatic neuroendocrine tumors. CT scan showed worsening metastatic disease as well as intra-and extrahepatic biliary ductal dilatation with marked distention of the gallbladder. Lipase noted >4000, Alk phos 212, Bili 1.6 AST/ALT 88/36.     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)        Past Surgical History:   Procedure Laterality Date    CATARACT EXTRACTION Left 10/2017    COLON SURGERY      cystoscope      EYE SURGERY      HYSTERECTOMY  5/1996    LITHOTRIPSY      LIVER BIOPSY  9/14    carcinoid       Review of patient's allergies indicates:   Allergen Reactions    Contrast media Hives, Itching and Swelling    Epinephrine      Carcinoid pt    Ibuprofen Hives, Itching and Swelling    Sulfa (sulfonamide antibiotics) Hives, Itching and Swelling     Family History     Problem Relation (Age of Onset)    Alzheimer's disease Father    Cancer Mother    No Known Problems Son, Son, Son, Son    Stroke  Sister        Social History Main Topics    Smoking status: Never Smoker    Smokeless tobacco: Never Used    Alcohol use No    Drug use: No    Sexual activity: Not on file     Review of Systems   Constitutional: Negative for chills and fever.   Respiratory: Negative for chest tightness and shortness of breath.    Cardiovascular: Negative for chest pain.   Gastrointestinal: Positive for abdominal pain, nausea and vomiting. Negative for anal bleeding, blood in stool, constipation, diarrhea and rectal pain.   Genitourinary: Negative for dysuria and frequency.   Skin: Negative for color change and rash.   Psychiatric/Behavioral: Negative for agitation and confusion.     Objective:     Vital Signs (Most Recent):  Temp: 97.8 °F (36.6 °C) (12/29/17 0904)  Pulse: 61 (12/29/17 1302)  Resp: 18 (12/29/17 0904)  BP: (!) 200/88 (12/29/17 1302)  SpO2: 99 % (12/29/17 1302) Vital Signs (24h Range):  Temp:  [95.2 °F (35.1 °C)-97.9 °F (36.6 °C)] 97.8 °F (36.6 °C)  Pulse:  [47-75] 61  Resp:  [16-22] 18  SpO2:  [99 %-100 %] 99 %  BP: (121-219)/(50-95) 200/88     Weight: 70.3 kg (155 lb) (12/29/17 0904)  Body mass index is 25.02 kg/m².    No intake or output data in the 24 hours ending 12/29/17 1337    Lines/Drains/Airways     Peripheral Intravenous Line                 Peripheral IV - Single Lumen 12/29/17 0003 Right Antecubital less than 1 day                Physical Exam   Constitutional: She is oriented to person, place, and time. She appears well-developed. No distress.   Cardiovascular: Normal rate, regular rhythm and normal heart sounds.  Exam reveals no friction rub.    No murmur heard.  Pulmonary/Chest: Effort normal and breath sounds normal. No respiratory distress. She has no wheezes.   Abdominal: Soft. Bowel sounds are normal. There is tenderness in the right upper quadrant.   Neurological: She is alert and oriented to person, place, and time.   Psychiatric: She has a normal mood and affect. Her behavior is normal.        Significant Labs:  CBC:   Recent Labs  Lab 12/29/17  0003   WBC 11.02   HGB 10.8*   HCT 34.0*        CMP:   Recent Labs  Lab 12/29/17 0003   *   CALCIUM 9.7   ALBUMIN 4.0   PROT 7.6      K 3.2*   CO2 30*      BUN 18*   CREATININE 1.15   ALKPHOS 212*   ALT 36   AST 88*   BILITOT 1.6*     Coagulation: No results for input(s): PT, INR, APTT in the last 48 hours.  Lipase:   Recent Labs  Lab 12/29/17  0003   LIPASE >4000*       Significant Imaging:  Imaging results within the past 24 hours have been reviewed.    Assessment/Plan:     * Pancreatitis due to biliary obstruction    - Secondary to metastatic neuroendocrine tumors.   - Case discussed with both Dr. Troncoso and Dr. Petty. Recommendations to transfer patient to [facility] for biliary services.         Neuroendocrine carcinoma, unknown primary site    Plan as above.            Thank you for your consult. I will follow-up with patient. Please contact us if you have any additional questions.    Mindy Santiago NP  Gastroenterology  [facility]

## 2017-12-29 NOTE — SUBJECTIVE & OBJECTIVE
Current Facility-Administered Medications on File Prior to Encounter   Medication    [COMPLETED] 0.9%  NaCl infusion    [COMPLETED] diphenhydrAMINE injection 25 mg    [COMPLETED] hydrALAZINE injection 5 mg    [COMPLETED] hydromorphone (PF) injection 1 mg    [COMPLETED] hydromorphone (PF) injection 1 mg    [COMPLETED] hydromorphone (PF) injection 1 mg    [COMPLETED] methylPREDNISolone sodium succinate injection 200 mg    [COMPLETED] morphine injection 4 mg    [COMPLETED] omnipaque 350 iohexol 75 mL    [COMPLETED] ondansetron injection 4 mg    [COMPLETED] piperacillin-tazobactam 4.5 g in dextrose 5 % 100 mL IVPB (ready to mix system)    [COMPLETED] potassium chloride 10 mEq in 100 mL IVPB    [COMPLETED] sodium chloride 0.9% bolus 1,000 mL     Current Outpatient Prescriptions on File Prior to Encounter   Medication Sig    cyanocobalamin 1,000 mcg/mL injection 1,000 mcg every 30 days.     docusate sodium (COLACE) 100 MG capsule Take 100 mg by mouth 2 (two) times daily as needed.     ergocalciferol (VITAMIN D2) 50,000 unit Cap Take 1 capsule (50,000 Units total) by mouth every 7 days.    ferrous gluconate (FERGON) 324 MG tablet TAKE 1 TABLET EVERY DAY WITH BREAKFAST    gabapentin (NEURONTIN) 100 MG capsule Take 100 mg by mouth 2 (two) times daily.    LOPERAMIDE HCL (IMODIUM A-D ORAL) Take by mouth daily as needed.     losartan (COZAAR) 50 MG tablet     magnesium oxide (MAG-OX) 250 mg Tab Take by mouth once daily.     metoprolol tartrate (LOPRESSOR) 50 MG tablet Take 50 mg by mouth 2 (two) times daily.    NEPAFENAC (ILEVRO OPHT) Apply 1 drop to eye once daily.     nystatin (MYCOSTATIN) 100,000 unit/mL suspension Take 6 mLs (600,000 Units total) by mouth 4 (four) times daily as needed.    ondansetron (ZOFRAN) 8 MG tablet Take by mouth every 8 (eight) hours as needed for Nausea.    oxycodone (ROXICODONE) 30 MG Tab     pantoprazole (PROTONIX) 40 MG tablet Take 40 mg by mouth once daily.     polyethylene glycol (GLYCOLAX) 17 gram/dose powder     POLYMYXIN B SULF/TRIMETHOPRIM (POLYTRIM OPHT) Apply 1 drop to eye 4 (four) times daily. Lt eye.    potassium citrate 15 mEq TbSR     prednisoLONE acetate (PRED FORTE) 1 % DrpS 1 drop 4 (four) times daily. Lt eye.    tramadol (ULTRAM) 50 mg tablet Take 50 mg by mouth every 6 (six) hours as needed for Pain.    trazodone (DESYREL) 50 MG tablet Take 50 mg by mouth nightly as needed.     triamcinolone acetonide 0.1% (KENALOG) 0.1 % cream Apply topically 2 (two) times daily.   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    Cardiovascular: Negative for chest pain, palpitations and leg swelling.     Objective:     Vital Signs (Most Recent):  Temp: 97.8 °F (36.6 °C) (12/29/17 0904)  Pulse: 75 (12/29/17 0904)  Resp: 18 (12/29/17 0904)  BP: (!) 181/72 (12/29/17 0904)  SpO2: 100 % (12/29/17 0904) Vital Signs  (24h Range):  Temp:  [95.2 °F (35.1 °C)-97.9 °F (36.6 °C)] 97.8 °F (36.6 °C)  Pulse:  [47-75] 75  Resp:  [16-22] 18  SpO2:  [99 %-100 %] 100 %  BP: (121-215)/(50-87) 181/72     Weight: 70.3 kg (155 lb)  Body mass index is 25.02 kg/m².    Physical Exam   Constitutional: She is oriented to person, place, and time. No distress.   Pulmonary/Chest: Effort normal. No respiratory distress.   Abdominal: She exhibits no distension. There is tenderness in the right upper quadrant. There is no rebound and no guarding.   Musculoskeletal: She exhibits no edema.   Neurological: She is alert and oriented to person, place, and time.   Skin: No rash noted. No erythema. No pallor.       Significant Labs:  CBC:   Recent Labs  Lab 12/29/17  0003   WBC 11.02   RBC 3.93*   HGB 10.8*   HCT 34.0*      MCV 87   MCH 27.5   MCHC 31.8*     CMP:   Recent Labs  Lab 12/29/17  0003   *   CALCIUM 9.7   ALBUMIN 4.0   PROT 7.6      K 3.2*   CO2 30*      BUN 18*   CREATININE 1.15   ALKPHOS 212*   ALT 36   AST 88*   BILITOT 1.6*       Significant Diagnostics:  I have reviewed all pertinent imaging results/findings within the past 24 hours.

## 2017-12-29 NOTE — ASSESSMENT & PLAN NOTE
Secondary to neuroendocrine tumor.  Admit, NPO, discuss with GI and Dr. Sánchez.  May need ERCP with stent +/- cholecystostomy.  Cholecystectomy would not be ideal in this setting due to metastatic disease.

## 2017-12-29 NOTE — HPI
Patient is a 68 yo AA female that presented to [facility]  For RUQ abdominal pain, nausea, and vomiting. Onset of symptoms began 1 month ago but were intermittent and not as severe. Symptoms worsened yesterday which prompted her to report to the ER. She has a history of metastatic neuroendocrine tumors. CT scan showed worsening metastatic disease as well as intra-and extrahepatic biliary ductal dilatation with marked distention of the gallbladder. Lipase noted >4000, Alk phos 212, Bili 1.6 AST/ALT 88/36.

## 2017-12-29 NOTE — DISCHARGE SUMMARY
Transfer Summary      Admit Date: 12/29/2017    Discharge Date and Time: 12/29/2017 3:16 PM     Active Hospital Problems    Diagnosis  POA    *Pancreatitis due to biliary obstruction [K85.90, K83.1]  Yes    Acute biliary pancreatitis without infection or necrosis [K85.10]  Yes    Neuroendocrine carcinoma, unknown primary site [C7A.8]  Yes    HTN (hypertension) [I10]  Yes      Resolved Hospital Problems    Diagnosis Date Resolved POA   No resolved problems to display.       Discharged Condition: stable    Reason for Admission: Pancreatitis due to biliary obstruction    Treatments/Procedures: None    Hospital Course:  The patient was transferred from [facility] due to biliary obstruction.  She has multiple neuroendocrine tumors of the pancreas, which are obstructing her bile due and causing pancreatitis and gallbladder distension.  We are unable to provide the services that she requires.  Discussed with Dr. Sánchez and Dr. Perez, and will transfer to [facility] for further care.    Outcome of treatment:  Pending    Disposition: Transferred to [facility]    Patient Instructions:   Current Discharge Medication List      CONTINUE these medications which have NOT CHANGED    Details   docusate sodium (COLACE) 100 MG capsule Take 100 mg by mouth 2 (two) times daily as needed.       ergocalciferol (VITAMIN D2) 50,000 unit Cap Take 1 capsule (50,000 Units total) by mouth every 7 days.  Qty: 12 capsule, Refills: 4      ferrous gluconate (FERGON) 324 MG tablet TAKE 1 TABLET EVERY DAY WITH BREAKFAST  Qty: 30 tablet, Refills: 2    Associated Diagnoses: Iron deficiency anemia, unspecified iron deficiency anemia type      gabapentin (NEURONTIN) 100 MG capsule Take 100 mg by mouth 2 (two) times daily.      losartan (COZAAR) 50 MG tablet once daily.       metoprolol tartrate (LOPRESSOR) 50 MG tablet Take 50 mg by mouth 2 (two) times daily.      ondansetron (ZOFRAN) 8 MG tablet Take by mouth every 8  (eight) hours as needed for Nausea.      oxycodone (ROXICODONE) 30 MG Tab       cyanocobalamin 1,000 mcg/mL injection 1,000 mcg every 30 days.   Refills: 0      LOPERAMIDE HCL (IMODIUM A-D ORAL) Take by mouth daily as needed.       magnesium oxide (MAG-OX) 250 mg Tab Take by mouth once daily.       nystatin (MYCOSTATIN) 100,000 unit/mL suspension Take 6 mLs (600,000 Units total) by mouth 4 (four) times daily as needed.  Qty: 60 mL, Refills: 0      polyethylene glycol (GLYCOLAX) 17 gram/dose powder       tramadol (ULTRAM) 50 mg tablet Take 50 mg by mouth every 6 (six) hours as needed for Pain.      trazodone (DESYREL) 50 MG tablet Take 50 mg by mouth nightly as needed.       triamcinolone acetonide 0.1% (KENALOG) 0.1 % cream Apply topically 2 (two) times daily.      XIIDRA 5 % Dpet          STOP taking these medications       pantoprazole (PROTONIX) 40 MG tablet Comments:   Reason for Stopping:                 Follow-up:  Per [facility]

## 2017-12-29 NOTE — ED NOTES
Pt resting in ER stretcher, aaox4, rr e/u, NAD noted. Pt remains on BP monitor with vss noted. Bed low and locked, call light in reach, side rails up x2. Pt assisted onto bedpan.  Pt verbalized understanding of status and POC.  Pt requested medication for abdominal pain but denies further needs. Will continue to monitor.

## 2017-12-29 NOTE — H&P
[facility] -   General Surgery  History & Physical    Patient Name: Patito Domingo  MRN: [id number]  Admission Date: 12/29/2017  Attending Physician: Arley Starks MD   Primary Care Provider: Magdalena Hernandez MD    Patient information was obtained from patient and ER records.     Subjective:     Chief Complaint/Reason for Admission: biliary obstruction    History of Present Illness: 67 y.o. female transferred from [facility] for distended gallbladder and pancreatitis due to multiple metastatic liver neuroendocrine tumors.  Patient has seen Dr. Sánchez in the past.  Currently c/o RUQ pain.  Vomiting last night.    Current Facility-Administered Medications on File Prior to Encounter   Medication    [COMPLETED] 0.9%  NaCl infusion    [COMPLETED] diphenhydrAMINE injection 25 mg    [COMPLETED] hydrALAZINE injection 5 mg    [COMPLETED] hydromorphone (PF) injection 1 mg    [COMPLETED] hydromorphone (PF) injection 1 mg    [COMPLETED] hydromorphone (PF) injection 1 mg    [COMPLETED] methylPREDNISolone sodium succinate injection 200 mg    [COMPLETED] morphine injection 4 mg    [COMPLETED] omnipaque 350 iohexol 75 mL    [COMPLETED] ondansetron injection 4 mg    [COMPLETED] piperacillin-tazobactam 4.5 g in dextrose 5 % 100 mL IVPB (ready to mix system)    [COMPLETED] potassium chloride 10 mEq in 100 mL IVPB    [COMPLETED] sodium chloride 0.9% bolus 1,000 mL     Current Outpatient Prescriptions on File Prior to Encounter   Medication Sig    cyanocobalamin 1,000 mcg/mL injection 1,000 mcg every 30 days.     docusate sodium (COLACE) 100 MG capsule Take 100 mg by mouth 2 (two) times daily as needed.     ergocalciferol (VITAMIN D2) 50,000 unit Cap Take 1 capsule (50,000 Units total) by mouth every 7 days.    ferrous gluconate (FERGON) 324 MG tablet TAKE 1 TABLET EVERY DAY WITH BREAKFAST    gabapentin (NEURONTIN) 100 MG capsule Take 100 mg by mouth 2 (two) times daily.     LOPERAMIDE HCL (IMODIUM A-D ORAL) Take by mouth daily as needed.     losartan (COZAAR) 50 MG tablet     magnesium oxide (MAG-OX) 250 mg Tab Take by mouth once daily.     metoprolol tartrate (LOPRESSOR) 50 MG tablet Take 50 mg by mouth 2 (two) times daily.    NEPAFENAC (ILEVRO OPHT) Apply 1 drop to eye once daily.     nystatin (MYCOSTATIN) 100,000 unit/mL suspension Take 6 mLs (600,000 Units total) by mouth 4 (four) times daily as needed.    ondansetron (ZOFRAN) 8 MG tablet Take by mouth every 8 (eight) hours as needed for Nausea.    oxycodone (ROXICODONE) 30 MG Tab     pantoprazole (PROTONIX) 40 MG tablet Take 40 mg by mouth once daily.    polyethylene glycol (GLYCOLAX) 17 gram/dose powder     POLYMYXIN B SULF/TRIMETHOPRIM (POLYTRIM OPHT) Apply 1 drop to eye 4 (four) times daily. Lt eye.    potassium citrate 15 mEq TbSR     prednisoLONE acetate (PRED FORTE) 1 % DrpS 1 drop 4 (four) times daily. Lt eye.    tramadol (ULTRAM) 50 mg tablet Take 50 mg by mouth every 6 (six) hours as needed for Pain.    trazodone (DESYREL) 50 MG tablet Take 50 mg by mouth nightly as needed.     triamcinolone acetonide 0.1% (KENALOG) 0.1 % cream Apply topically 2 (two) times daily.   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)      Past Surgical History:   Procedure Laterality Date    COLON SURGERY      cystoscope      HYSTERECTOMY  5/1996    LITHOTRIPSY      LIVER BIOPSY  9/14    carcinoid     Family History     Problem Relation (Age of Onset)     Alzheimer's disease Father    Cancer Mother    No Known Problems Son, Son, Son, Son    Stroke Sister        Social History Main Topics    Smoking status: Never Smoker    Smokeless tobacco: Never Used    Alcohol use No    Drug use: No    Sexual activity: Not on file     Review of Systems   Respiratory: Negative for cough, chest tightness and shortness of breath.    Cardiovascular: Negative for chest pain, palpitations and leg swelling.     Objective:     Vital Signs (Most Recent):  Temp: 97.8 °F (36.6 °C) (12/29/17 0904)  Pulse: 75 (12/29/17 0904)  Resp: 18 (12/29/17 0904)  BP: (!) 181/72 (12/29/17 0904)  SpO2: 100 % (12/29/17 0904) Vital Signs (24h Range):  Temp:  [95.2 °F (35.1 °C)-97.9 °F (36.6 °C)] 97.8 °F (36.6 °C)  Pulse:  [47-75] 75  Resp:  [16-22] 18  SpO2:  [99 %-100 %] 100 %  BP: (121-215)/(50-87) 181/72     Weight: 70.3 kg (155 lb)  Body mass index is 25.02 kg/m².    Physical Exam   Constitutional: She is oriented to person, place, and time. No distress.   Pulmonary/Chest: Effort normal. No respiratory distress.   Abdominal: She exhibits no distension. There is tenderness in the right upper quadrant. There is no rebound and no guarding.   Musculoskeletal: She exhibits no edema.   Neurological: She is alert and oriented to person, place, and time.   Skin: No rash noted. No erythema. No pallor.       Significant Labs:  CBC:   Recent Labs  Lab 12/29/17  0003   WBC 11.02   RBC 3.93*   HGB 10.8*   HCT 34.0*      MCV 87   MCH 27.5   MCHC 31.8*     CMP:   Recent Labs  Lab 12/29/17  0003   *   CALCIUM 9.7   ALBUMIN 4.0   PROT 7.6      K 3.2*   CO2 30*      BUN 18*   CREATININE 1.15   ALKPHOS 212*   ALT 36   AST 88*   BILITOT 1.6*       Significant Diagnostics:  I have reviewed all pertinent imaging results/findings within the past 24 hours.    Assessment/Plan:     Pancreatitis due to biliary obstruction    Secondary to neuroendocrine tumor.  Admit, NPO, discuss with GI and   Princess.  May need ERCP with stent +/- cholecystostomy.  Cholecystectomy would not be ideal in this setting due to metastatic disease.          VTE Risk Mitigation     None          Louis O. Jeansonne, MD  General Surgery  [facility] -

## 2017-12-29 NOTE — ED NOTES
Received t/c from Nesha at the transfer center.  Pt will be admitted to room 521 at [facility].  Report can be called to [phone].  She will arrange transportation.

## 2017-12-29 NOTE — HPI
67 y.o. female transferred from [facility] for distended gallbladder and pancreatitis due to multiple metastatic liver neuroendocrine tumors.  Patient has seen Dr. Sánchez in the past.  Currently c/o RUQ pain.  Vomiting last night.

## 2017-12-29 NOTE — ED PROVIDER NOTES
SCRIBE #1 NOTE: I, Speedy Mayer, am scribing for, and in the presence of, Arley Starks MD. I have scribed the entire note.      History      Chief Complaint   Patient presents with    Abdominal Pain     pt sent form [facility] for mass pressing on biliary artery       Review of patient's allergies indicates:   Allergen Reactions    Contrast media Hives, Itching and Swelling    Epinephrine      Carcinoid pt    Ibuprofen Hives, Itching and Swelling    Sulfa (sulfonamide antibiotics) Hives, Itching and Swelling        HPI   HPI    12/29/2017, 9:07 AM   History obtained from the patient      History of Present Illness: Patito Domingo is a 67 y.o. female patient who presents to the Emergency Department, transferred from [facility] for biliary artery mass found on CT today. Pt presented to [facility] last night for severe ABD pain. Sxs are constant and moderate in severity. There are no mitigating or exacerbating factors noted. Associated sxs include nausea, emesis, diarrhea.  Pt denies any fever, chills, CP, SOB, blood in stool, constipation, and all other sxs at this time. No further complaints or concerns at this time.        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)        Past Surgical History:  Past Surgical History:   Procedure Laterality Date    COLON SURGERY      cystoscope      HYSTERECTOMY  5/1996    LITHOTRIPSY      LIVER BIOPSY  9/14    carcinoid         Family History:  Family History   Problem Relation Age of Onset    Cancer Mother      unknown    Alzheimer's disease Father     Stroke Sister     No Known Problems Son     No Known Problems Son     No  Known Problems Son     No Known Problems Son        Social History:  Social History     Social History Main Topics    Smoking status: Never Smoker    Smokeless tobacco: Never Used    Alcohol use No    Drug use: No    Sexual activity: unknown       ROS   Review of Systems   Constitutional: Negative for fever.   HENT: Negative for sore throat.    Respiratory: Negative for shortness of breath.    Cardiovascular: Negative for chest pain.   Gastrointestinal: Positive for abdominal pain, diarrhea, nausea and vomiting. Negative for blood in stool and constipation.   Genitourinary: Negative for dysuria.   Musculoskeletal: Negative for back pain.   Skin: Negative for rash.   Neurological: Negative for weakness.   Hematological: Does not bruise/bleed easily.       Physical Exam      Initial Vitals [12/29/17 0904]   BP Pulse Resp Temp SpO2   (!) 181/72 75 18 97.8 °F (36.6 °C) 100 %      MAP       108.33          Physical Exam  Nursing Notes and Vital Signs Reviewed.  Constitutional: Patient is in no acute distress. Well-developed and well-nourished.  Head: Atraumatic. Normocephalic.  Eyes: PERRL. EOM intact. Conjunctivae are not pale. No scleral icterus.  ENT: Mucous membranes are moist. Oropharynx is clear and symmetric.    Neck: Supple. Full ROM. No lymphadenopathy.  Cardiovascular: Regular rate. Regular rhythm. No murmurs, rubs, or gallops. Distal pulses are 2+ and symmetric.  Pulmonary/Chest: No respiratory distress. Clear to auscultation bilaterally. No wheezing or rales.  Abdominal: Soft and non-distended.  There is epigastric tenderness.  No rebound, guarding, or rigidity. Good bowel sounds.  Genitourinary: No CVA tenderness  Musculoskeletal: Moves all extremities. No obvious deformities. No edema. No calf tenderness.  Skin: Warm and dry.  Neurological:  Alert, awake, and appropriate.  Normal speech.  No acute focal neurological deficits are appreciated.  Psychiatric: Normal affect. Good eye contact. Appropriate  "in content.    ED Course    Procedures  ED Vital Signs:  Vitals:    17 0904   BP: (!) 181/72   Pulse: 75   Resp: 18   Temp: 97.8 °F (36.6 °C)   TempSrc: Oral   SpO2: 100%   Weight: 70.3 kg (155 lb)   Height: 5' 6" (1.676 m)         [place] [address]  [phone]     Radiology Result      Name:   :  Patient MRN:   Patito Domingo 1950 [id number]   Account Number: Room & Bed Accession Number:   [id number]   [id number]   Authorizing Physician: Patient Class: Diagnosis:   [facility]     Procedure:  Exam Date: Reason for Exam:   CT Abdomen Pelvis With Contrast 2017 abdominal pain, nausea          RESULTS:  CT abdomen pelvis with contrast.     Findings: The visualized portion of the base of the lungs is significant for bilateral basilar atelectatic versus fibrotic change. The visualized portion of the heart, stomach, and spleen are unremarkable. There is fatty atrophy of the pancreas. The liver contains innumerable small hypodense lesions throughout both the right and left lobes consistent with extensive metastatic disease. There has been interval development of numerous additional lesions compared to previous CT dated 17. Index lesion within the posterior segment of the right lobe measures 1.7 x 1.6 cm compared to 1.9 x 2 cm on previous exam. There is intra-and extrahepatic biliary ductal dilatation. The common duct measures 1 cm. The gallbladder is distended measuring 5.8 cm in diameter. There is a 3.2 x 1.9 centimeter mesenteric mass which measured 2.9 x 1.7 cm on previous CT dated 17. The adrenal glands and visualized portion of the aorta are unremarkable. There are multiple renal stones bilaterally similar prior exams. The kidneys appropriately concentrate and excrete contrast on the delayed images. The bladder is unremarkable. The uterus is absent or atrophic. There are no dilated loops of bowel. The osseous structures " demonstrate degenerative change.  IMPRESSION:    Interval development of numerous additional hepatic masses consistent with worsening metastatic disease.     Intra-and extrahepatic biliary ductal dilatation with marked distention of the gallbladder.     Interval enlargement of mesenteric mass.     Bilateral nephrolithiasis.        Electronically signed by: RUBIO LEA MD  Date:                                            12/29/17  Time:                                           08:57           Signed By:  Rubio Lea MD on 12/29/2017  8:57 AM                       The Emergency Provider reviewed the vital signs and test results, which are outlined above.    ED Discussion     9:24 AM: Dr. Starks discussed the pt's case with Dr. Jeansonne (General Surgery) who will evaluate pt at bedside.    9:51 AM: Re-evaluated pt. I have discussed test results, shared treatment plan, and the need for admission with patient and family at bedside. Pt and family express understanding at this time and agree with all information. All questions answered. Pt and family have no further questions or concerns at this time. Pt is ready for admit.    10:00 AM: Discussed case with Dr. Wiseman (General Surgery). Dr. Jeansonne agrees with current care and management of pt and accepts admission.   Admitting Service: General Surgery   Admitting Physician: Dr. Jeansonne  Admit to: Observation      ED Medication(s):  Medications   metoprolol tartrate (LOPRESSOR) tablet 50 mg (not administered)   gabapentin capsule 100 mg (not administered)   pantoprazole EC tablet 40 mg (not administered)   traZODone tablet 50 mg (not administered)   lactated ringers infusion (not administered)   acetaminophen tablet 650 mg (not administered)   hydrocodone-acetaminophen 5-325mg per tablet 1 tablet (not administered)   morphine injection 2 mg (not administered)   ondansetron disintegrating tablet 8 mg (not administered)   diphenhydrAMINE injection 25 mg (not  administered)   promethazine suppository 25 mg (not administered)       New Prescriptions    No medications on file             Medical Decision Making    Medical Decision Making:   Clinical Tests:   Radiological Study: Reviewed           Scribe Attestation:   Scribe #1: I performed the above scribed service and the documentation accurately describes the services I performed. I attest to the accuracy of the note.    Attending:   Physician Attestation Statement for Scribe #1: I, Arley Starks MD, personally performed the services described in this documentation, as scribed by Speedy Mayer, in my presence, and it is both accurate and complete.          Clinical Impression       ICD-10-CM ICD-9-CM   1. Acute biliary pancreatitis without infection or necrosis K85.10 577.0   2. Liver metastasis C78.7 197.7       Disposition:   Disposition: Admitted  Condition: Serious         Arley Starks MD  12/29/17 1105

## 2017-12-30 ENCOUNTER — ANESTHESIA EVENT (OUTPATIENT)
Dept: SURGERY | Facility: HOSPITAL | Age: 67
DRG: 414 | End: 2017-12-30
Payer: MEDICARE

## 2017-12-30 ENCOUNTER — SURGERY (OUTPATIENT)
Age: 67
End: 2017-12-30

## 2017-12-30 ENCOUNTER — ANESTHESIA (OUTPATIENT)
Dept: SURGERY | Facility: HOSPITAL | Age: 67
DRG: 414 | End: 2017-12-30
Payer: MEDICARE

## 2017-12-30 LAB
ALBUMIN SERPL BCP-MCNC: 2.8 G/DL
ALBUMIN SERPL BCP-MCNC: 2.8 G/DL
ALP SERPL-CCNC: 219 U/L
ALP SERPL-CCNC: 219 U/L
ALT SERPL W/O P-5'-P-CCNC: 56 U/L
ALT SERPL W/O P-5'-P-CCNC: 56 U/L
AMYLASE SERPL-CCNC: 399 U/L
ANION GAP SERPL CALC-SCNC: 13 MMOL/L
ANION GAP SERPL CALC-SCNC: 13 MMOL/L
APTT BLDCRRT: 26.1 SEC
AST SERPL-CCNC: 43 U/L
AST SERPL-CCNC: 43 U/L
BASOPHILS # BLD AUTO: 0.01 K/UL
BASOPHILS # BLD AUTO: 0.01 K/UL
BASOPHILS NFR BLD: 0.1 %
BASOPHILS NFR BLD: 0.1 %
BILIRUB SERPL-MCNC: 1.2 MG/DL
BILIRUB SERPL-MCNC: 1.2 MG/DL
BUN SERPL-MCNC: 23 MG/DL
BUN SERPL-MCNC: 23 MG/DL
CALCIUM SERPL-MCNC: 9.2 MG/DL
CALCIUM SERPL-MCNC: 9.2 MG/DL
CHLORIDE SERPL-SCNC: 105 MMOL/L
CHLORIDE SERPL-SCNC: 105 MMOL/L
CO2 SERPL-SCNC: 23 MMOL/L
CO2 SERPL-SCNC: 23 MMOL/L
CREAT SERPL-MCNC: 1.3 MG/DL
CREAT SERPL-MCNC: 1.3 MG/DL
DIFFERENTIAL METHOD: ABNORMAL
DIFFERENTIAL METHOD: ABNORMAL
EOSINOPHIL # BLD AUTO: 0 K/UL
EOSINOPHIL # BLD AUTO: 0 K/UL
EOSINOPHIL NFR BLD: 0 %
EOSINOPHIL NFR BLD: 0 %
ERYTHROCYTE [DISTWIDTH] IN BLOOD BY AUTOMATED COUNT: 14.7 %
ERYTHROCYTE [DISTWIDTH] IN BLOOD BY AUTOMATED COUNT: 14.7 %
EST. GFR  (AFRICAN AMERICAN): 50 ML/MIN/1.73 M^2
EST. GFR  (AFRICAN AMERICAN): 50 ML/MIN/1.73 M^2
EST. GFR  (NON AFRICAN AMERICAN): 43 ML/MIN/1.73 M^2
EST. GFR  (NON AFRICAN AMERICAN): 43 ML/MIN/1.73 M^2
ESTIMATED AVG GLUCOSE: 114 MG/DL
GLUCOSE SERPL-MCNC: 151 MG/DL
GLUCOSE SERPL-MCNC: 151 MG/DL
HBA1C MFR BLD HPLC: 5.6 %
HCT VFR BLD AUTO: 32.9 %
HCT VFR BLD AUTO: 32.9 %
HGB BLD-MCNC: 10.4 G/DL
HGB BLD-MCNC: 10.4 G/DL
INR PPP: 1
LIPASE SERPL-CCNC: 727 U/L
LYMPHOCYTES # BLD AUTO: 0.7 K/UL
LYMPHOCYTES # BLD AUTO: 0.7 K/UL
LYMPHOCYTES NFR BLD: 5.3 %
LYMPHOCYTES NFR BLD: 5.3 %
MAGNESIUM SERPL-MCNC: 1.2 MG/DL
MCH RBC QN AUTO: 26.7 PG
MCH RBC QN AUTO: 26.7 PG
MCHC RBC AUTO-ENTMCNC: 31.6 G/DL
MCHC RBC AUTO-ENTMCNC: 31.6 G/DL
MCV RBC AUTO: 85 FL
MCV RBC AUTO: 85 FL
MONOCYTES # BLD AUTO: 1 K/UL
MONOCYTES # BLD AUTO: 1 K/UL
MONOCYTES NFR BLD: 7.6 %
MONOCYTES NFR BLD: 7.6 %
NEUTROPHILS # BLD AUTO: 11.7 K/UL
NEUTROPHILS # BLD AUTO: 11.7 K/UL
NEUTROPHILS NFR BLD: 86.6 %
NEUTROPHILS NFR BLD: 86.6 %
PHOSPHATE SERPL-MCNC: 3.6 MG/DL
PLATELET # BLD AUTO: 204 K/UL
PLATELET # BLD AUTO: 204 K/UL
PMV BLD AUTO: 11 FL
PMV BLD AUTO: 11 FL
POTASSIUM SERPL-SCNC: 3.1 MMOL/L
POTASSIUM SERPL-SCNC: 3.1 MMOL/L
PROT SERPL-MCNC: 6.7 G/DL
PROT SERPL-MCNC: 6.7 G/DL
PROTHROMBIN TIME: 10.9 SEC
RBC # BLD AUTO: 3.89 M/UL
RBC # BLD AUTO: 3.89 M/UL
SODIUM SERPL-SCNC: 141 MMOL/L
SODIUM SERPL-SCNC: 141 MMOL/L
WBC # BLD AUTO: 13.51 K/UL
WBC # BLD AUTO: 13.51 K/UL

## 2017-12-30 PROCEDURE — 85730 THROMBOPLASTIN TIME PARTIAL: CPT

## 2017-12-30 PROCEDURE — 83690 ASSAY OF LIPASE: CPT

## 2017-12-30 PROCEDURE — 37000009 HC ANESTHESIA EA ADD 15 MINS: Performed by: SURGERY

## 2017-12-30 PROCEDURE — 27201423 OPTIME MED/SURG SUP & DEVICES STERILE SUPPLY: Performed by: SURGERY

## 2017-12-30 PROCEDURE — 63600175 PHARM REV CODE 636 W HCPCS: Performed by: STUDENT IN AN ORGANIZED HEALTH CARE EDUCATION/TRAINING PROGRAM

## 2017-12-30 PROCEDURE — 85610 PROTHROMBIN TIME: CPT

## 2017-12-30 PROCEDURE — 63600175 PHARM REV CODE 636 W HCPCS: Performed by: FAMILY MEDICINE

## 2017-12-30 PROCEDURE — BF10YZZ FLUOROSCOPY OF BILE DUCTS USING OTHER CONTRAST: ICD-10-PCS | Performed by: SURGERY

## 2017-12-30 PROCEDURE — 80053 COMPREHEN METABOLIC PANEL: CPT

## 2017-12-30 PROCEDURE — 25000003 PHARM REV CODE 250: Performed by: STUDENT IN AN ORGANIZED HEALTH CARE EDUCATION/TRAINING PROGRAM

## 2017-12-30 PROCEDURE — 88304 TISSUE EXAM BY PATHOLOGIST: CPT | Performed by: PATHOLOGY

## 2017-12-30 PROCEDURE — 84100 ASSAY OF PHOSPHORUS: CPT

## 2017-12-30 PROCEDURE — 83036 HEMOGLOBIN GLYCOSYLATED A1C: CPT

## 2017-12-30 PROCEDURE — 36415 COLL VENOUS BLD VENIPUNCTURE: CPT

## 2017-12-30 PROCEDURE — 25000003 PHARM REV CODE 250: Performed by: FAMILY MEDICINE

## 2017-12-30 PROCEDURE — 83735 ASSAY OF MAGNESIUM: CPT

## 2017-12-30 PROCEDURE — 94761 N-INVAS EAR/PLS OXIMETRY MLT: CPT

## 2017-12-30 PROCEDURE — 36000706: Performed by: SURGERY

## 2017-12-30 PROCEDURE — 63600175 PHARM REV CODE 636 W HCPCS

## 2017-12-30 PROCEDURE — C1757 CATH, THROMBECTOMY/EMBOLECT: HCPCS | Performed by: SURGERY

## 2017-12-30 PROCEDURE — 36000707: Performed by: SURGERY

## 2017-12-30 PROCEDURE — 71000033 HC RECOVERY, INTIAL HOUR: Performed by: SURGERY

## 2017-12-30 PROCEDURE — 85025 COMPLETE CBC W/AUTO DIFF WBC: CPT

## 2017-12-30 PROCEDURE — 0FT40ZZ RESECTION OF GALLBLADDER, OPEN APPROACH: ICD-10-PCS | Performed by: SURGERY

## 2017-12-30 PROCEDURE — 82150 ASSAY OF AMYLASE: CPT

## 2017-12-30 PROCEDURE — 88304 TISSUE EXAM BY PATHOLOGIST: CPT | Mod: 26,,, | Performed by: PATHOLOGY

## 2017-12-30 PROCEDURE — 37000008 HC ANESTHESIA 1ST 15 MINUTES: Performed by: SURGERY

## 2017-12-30 PROCEDURE — 25000003 PHARM REV CODE 250: Performed by: SURGERY

## 2017-12-30 PROCEDURE — C1887 CATHETER, GUIDING: HCPCS | Performed by: SURGERY

## 2017-12-30 PROCEDURE — 94799 UNLISTED PULMONARY SVC/PX: CPT

## 2017-12-30 PROCEDURE — 11000001 HC ACUTE MED/SURG PRIVATE ROOM

## 2017-12-30 RX ORDER — NEOSTIGMINE METHYLSULFATE 1 MG/ML
INJECTION, SOLUTION INTRAVENOUS
Status: DISCONTINUED | OUTPATIENT
Start: 2017-12-30 | End: 2017-12-30

## 2017-12-30 RX ORDER — DEXTROSE MONOHYDRATE, SODIUM CHLORIDE, AND POTASSIUM CHLORIDE 50; 1.49; 4.5 G/1000ML; G/1000ML; G/1000ML
INJECTION, SOLUTION INTRAVENOUS CONTINUOUS
Status: DISCONTINUED | OUTPATIENT
Start: 2017-12-30 | End: 2018-01-01

## 2017-12-30 RX ORDER — METOPROLOL TARTRATE 50 MG/1
50 TABLET ORAL 2 TIMES DAILY
Status: DISCONTINUED | OUTPATIENT
Start: 2017-12-30 | End: 2018-01-02 | Stop reason: HOSPADM

## 2017-12-30 RX ORDER — ONDANSETRON 2 MG/ML
INJECTION INTRAMUSCULAR; INTRAVENOUS
Status: DISCONTINUED | OUTPATIENT
Start: 2017-12-30 | End: 2017-12-30

## 2017-12-30 RX ORDER — OXYCODONE AND ACETAMINOPHEN 5; 325 MG/1; MG/1
1 TABLET ORAL
Status: DISCONTINUED | OUTPATIENT
Start: 2017-12-30 | End: 2018-01-02 | Stop reason: HOSPADM

## 2017-12-30 RX ORDER — ROCURONIUM BROMIDE 10 MG/ML
INJECTION, SOLUTION INTRAVENOUS
Status: DISCONTINUED | OUTPATIENT
Start: 2017-12-30 | End: 2017-12-30

## 2017-12-30 RX ORDER — LABETALOL HYDROCHLORIDE 5 MG/ML
INJECTION, SOLUTION INTRAVENOUS
Status: DISCONTINUED | OUTPATIENT
Start: 2017-12-30 | End: 2017-12-30

## 2017-12-30 RX ORDER — LIDOCAINE HCL/PF 100 MG/5ML
SYRINGE (ML) INTRAVENOUS
Status: DISCONTINUED | OUTPATIENT
Start: 2017-12-30 | End: 2017-12-30

## 2017-12-30 RX ORDER — DIPHENHYDRAMINE HYDROCHLORIDE 50 MG/ML
25 INJECTION INTRAMUSCULAR; INTRAVENOUS EVERY 6 HOURS PRN
Status: DISCONTINUED | OUTPATIENT
Start: 2017-12-30 | End: 2018-01-02 | Stop reason: HOSPADM

## 2017-12-30 RX ORDER — HYDROMORPHONE HYDROCHLORIDE 2 MG/ML
1 INJECTION, SOLUTION INTRAMUSCULAR; INTRAVENOUS; SUBCUTANEOUS EVERY 4 HOURS PRN
Status: DISCONTINUED | OUTPATIENT
Start: 2017-12-30 | End: 2018-01-02 | Stop reason: HOSPADM

## 2017-12-30 RX ORDER — ONDANSETRON 2 MG/ML
4 INJECTION INTRAMUSCULAR; INTRAVENOUS DAILY PRN
Status: DISCONTINUED | OUTPATIENT
Start: 2017-12-30 | End: 2018-01-02 | Stop reason: HOSPADM

## 2017-12-30 RX ORDER — PHENYLEPHRINE HYDROCHLORIDE 10 MG/ML
INJECTION INTRAVENOUS
Status: DISCONTINUED | OUTPATIENT
Start: 2017-12-30 | End: 2017-12-30

## 2017-12-30 RX ORDER — MIDAZOLAM HYDROCHLORIDE 1 MG/ML
INJECTION, SOLUTION INTRAMUSCULAR; INTRAVENOUS
Status: DISCONTINUED | OUTPATIENT
Start: 2017-12-30 | End: 2017-12-30

## 2017-12-30 RX ORDER — OXYCODONE AND ACETAMINOPHEN 5; 325 MG/1; MG/1
2 TABLET ORAL EVERY 6 HOURS PRN
Status: DISCONTINUED | OUTPATIENT
Start: 2017-12-30 | End: 2018-01-02 | Stop reason: HOSPADM

## 2017-12-30 RX ORDER — HYDROMORPHONE HYDROCHLORIDE 2 MG/ML
0.4 INJECTION, SOLUTION INTRAMUSCULAR; INTRAVENOUS; SUBCUTANEOUS EVERY 5 MIN PRN
Status: DISCONTINUED | OUTPATIENT
Start: 2017-12-30 | End: 2018-01-02 | Stop reason: HOSPADM

## 2017-12-30 RX ORDER — HYDROMORPHONE HYDROCHLORIDE 2 MG/ML
0.5 INJECTION, SOLUTION INTRAMUSCULAR; INTRAVENOUS; SUBCUTANEOUS EVERY 4 HOURS PRN
Status: DISCONTINUED | OUTPATIENT
Start: 2017-12-30 | End: 2017-12-30

## 2017-12-30 RX ORDER — LOSARTAN POTASSIUM 50 MG/1
50 TABLET ORAL DAILY
Status: DISCONTINUED | OUTPATIENT
Start: 2017-12-30 | End: 2018-01-02 | Stop reason: HOSPADM

## 2017-12-30 RX ORDER — HYDROMORPHONE HYDROCHLORIDE 2 MG/ML
INJECTION, SOLUTION INTRAMUSCULAR; INTRAVENOUS; SUBCUTANEOUS
Status: COMPLETED
Start: 2017-12-30 | End: 2017-12-30

## 2017-12-30 RX ORDER — FENTANYL CITRATE 50 UG/ML
INJECTION, SOLUTION INTRAMUSCULAR; INTRAVENOUS
Status: DISCONTINUED | OUTPATIENT
Start: 2017-12-30 | End: 2017-12-30

## 2017-12-30 RX ORDER — PROPOFOL 10 MG/ML
VIAL (ML) INTRAVENOUS
Status: DISCONTINUED | OUTPATIENT
Start: 2017-12-30 | End: 2017-12-30

## 2017-12-30 RX ORDER — GLYCOPYRROLATE 0.2 MG/ML
INJECTION INTRAMUSCULAR; INTRAVENOUS
Status: DISCONTINUED | OUTPATIENT
Start: 2017-12-30 | End: 2017-12-30

## 2017-12-30 RX ORDER — SODIUM CHLORIDE, SODIUM LACTATE, POTASSIUM CHLORIDE, CALCIUM CHLORIDE 600; 310; 30; 20 MG/100ML; MG/100ML; MG/100ML; MG/100ML
INJECTION, SOLUTION INTRAVENOUS CONTINUOUS PRN
Status: DISCONTINUED | OUTPATIENT
Start: 2017-12-30 | End: 2017-12-30

## 2017-12-30 RX ORDER — MEPERIDINE HYDROCHLORIDE 50 MG/ML
12.5 INJECTION INTRAMUSCULAR; INTRAVENOUS; SUBCUTANEOUS ONCE AS NEEDED
Status: ACTIVE | OUTPATIENT
Start: 2017-12-30 | End: 2017-12-30

## 2017-12-30 RX ORDER — ESMOLOL HYDROCHLORIDE 10 MG/ML
INJECTION INTRAVENOUS
Status: DISCONTINUED | OUTPATIENT
Start: 2017-12-30 | End: 2017-12-30

## 2017-12-30 RX ORDER — SODIUM CHLORIDE 9 MG/ML
INJECTION, SOLUTION INTRAVENOUS CONTINUOUS
Status: DISCONTINUED | OUTPATIENT
Start: 2017-12-30 | End: 2017-12-30

## 2017-12-30 RX ORDER — BACITRACIN 50000 [IU]/1
INJECTION, POWDER, FOR SOLUTION INTRAMUSCULAR
Status: DISCONTINUED | OUTPATIENT
Start: 2017-12-30 | End: 2017-12-30 | Stop reason: HOSPADM

## 2017-12-30 RX ORDER — METOCLOPRAMIDE HYDROCHLORIDE 5 MG/ML
10 INJECTION INTRAMUSCULAR; INTRAVENOUS EVERY 10 MIN PRN
Status: DISCONTINUED | OUTPATIENT
Start: 2017-12-30 | End: 2018-01-02 | Stop reason: HOSPADM

## 2017-12-30 RX ADMIN — HYDROMORPHONE HYDROCHLORIDE 0.4 MG: 2 INJECTION INTRAMUSCULAR; INTRAVENOUS; SUBCUTANEOUS at 03:12

## 2017-12-30 RX ADMIN — DIPHENHYDRAMINE HYDROCHLORIDE 25 MG: 50 INJECTION, SOLUTION INTRAMUSCULAR; INTRAVENOUS at 01:12

## 2017-12-30 RX ADMIN — SODIUM CHLORIDE, SODIUM LACTATE, POTASSIUM CHLORIDE, AND CALCIUM CHLORIDE: .6; .31; .03; .02 INJECTION, SOLUTION INTRAVENOUS at 01:12

## 2017-12-30 RX ADMIN — DEXTROSE MONOHYDRATE, SODIUM CHLORIDE, AND POTASSIUM CHLORIDE: 50; 4.5; 1.49 INJECTION, SOLUTION INTRAVENOUS at 08:12

## 2017-12-30 RX ADMIN — HYDROMORPHONE HYDROCHLORIDE 0.5 MG: 2 INJECTION INTRAMUSCULAR; INTRAVENOUS; SUBCUTANEOUS at 04:12

## 2017-12-30 RX ADMIN — METOPROLOL TARTRATE 50 MG: 50 TABLET, FILM COATED ORAL at 08:12

## 2017-12-30 RX ADMIN — DEXTROSE MONOHYDRATE, SODIUM CHLORIDE, AND POTASSIUM CHLORIDE: 50; 4.5; 1.49 INJECTION, SOLUTION INTRAVENOUS at 10:12

## 2017-12-30 RX ADMIN — BACITRACIN 50000 UNITS: 5000 INJECTION, POWDER, FOR SOLUTION INTRAMUSCULAR at 01:12

## 2017-12-30 RX ADMIN — PROPOFOL 120 MG: 10 INJECTION, EMULSION INTRAVENOUS at 01:12

## 2017-12-30 RX ADMIN — HYDROMORPHONE HYDROCHLORIDE 0.5 MG: 2 INJECTION INTRAMUSCULAR; INTRAVENOUS; SUBCUTANEOUS at 08:12

## 2017-12-30 RX ADMIN — FENTANYL CITRATE 100 MCG: 50 INJECTION, SOLUTION INTRAMUSCULAR; INTRAVENOUS at 01:12

## 2017-12-30 RX ADMIN — GLYCOPYRROLATE 0.6 MG: 0.2 INJECTION, SOLUTION INTRAMUSCULAR; INTRAVENOUS at 02:12

## 2017-12-30 RX ADMIN — PROPOFOL 20 MG: 10 INJECTION, EMULSION INTRAVENOUS at 02:12

## 2017-12-30 RX ADMIN — HYDRALAZINE HYDROCHLORIDE 10 MG: 20 INJECTION INTRAMUSCULAR; INTRAVENOUS at 05:12

## 2017-12-30 RX ADMIN — PROPOFOL 30 MG: 10 INJECTION, EMULSION INTRAVENOUS at 01:12

## 2017-12-30 RX ADMIN — FENTANYL CITRATE 50 MCG: 50 INJECTION, SOLUTION INTRAMUSCULAR; INTRAVENOUS at 02:12

## 2017-12-30 RX ADMIN — LOSARTAN POTASSIUM 50 MG: 50 TABLET, FILM COATED ORAL at 12:12

## 2017-12-30 RX ADMIN — ROCURONIUM BROMIDE 50 MG: 10 INJECTION, SOLUTION INTRAVENOUS at 01:12

## 2017-12-30 RX ADMIN — OXYCODONE AND ACETAMINOPHEN 2 TABLET: 5; 325 TABLET ORAL at 04:12

## 2017-12-30 RX ADMIN — PIPERACILLIN SODIUM AND TAZOBACTAM SODIUM 4.5 G: 4; .5 INJECTION, POWDER, LYOPHILIZED, FOR SOLUTION INTRAVENOUS at 05:12

## 2017-12-30 RX ADMIN — FENTANYL CITRATE 50 MCG: 50 INJECTION, SOLUTION INTRAMUSCULAR; INTRAVENOUS at 01:12

## 2017-12-30 RX ADMIN — HYDROMORPHONE HYDROCHLORIDE 1 MG: 2 INJECTION INTRAMUSCULAR; INTRAVENOUS; SUBCUTANEOUS at 08:12

## 2017-12-30 RX ADMIN — HYDRALAZINE HYDROCHLORIDE 10 MG: 20 INJECTION INTRAMUSCULAR; INTRAVENOUS at 06:12

## 2017-12-30 RX ADMIN — HYDROMORPHONE HYDROCHLORIDE 0.4 MG: 2 INJECTION INTRAMUSCULAR; INTRAVENOUS; SUBCUTANEOUS at 04:12

## 2017-12-30 RX ADMIN — PIPERACILLIN SODIUM AND TAZOBACTAM SODIUM 4.5 G: 4; .5 INJECTION, POWDER, LYOPHILIZED, FOR SOLUTION INTRAVENOUS at 10:12

## 2017-12-30 RX ADMIN — ROCURONIUM BROMIDE 20 MG: 10 INJECTION, SOLUTION INTRAVENOUS at 02:12

## 2017-12-30 RX ADMIN — ESMOLOL HYDROCHLORIDE 20 MG: 10 INJECTION, SOLUTION INTRAVENOUS at 01:12

## 2017-12-30 RX ADMIN — NEOSTIGMINE METHYLSULFATE 3 MG: 1 INJECTION INTRAVENOUS at 02:12

## 2017-12-30 RX ADMIN — FENTANYL CITRATE 50 MCG: 50 INJECTION, SOLUTION INTRAMUSCULAR; INTRAVENOUS at 03:12

## 2017-12-30 RX ADMIN — PROPOFOL 30 MG: 10 INJECTION, EMULSION INTRAVENOUS at 02:12

## 2017-12-30 RX ADMIN — LABETALOL HYDROCHLORIDE 10 MG: 5 INJECTION, SOLUTION INTRAVENOUS at 03:12

## 2017-12-30 RX ADMIN — SUGAMMADEX 150 MG: 100 INJECTION, SOLUTION INTRAVENOUS at 03:12

## 2017-12-30 RX ADMIN — PHENYLEPHRINE HYDROCHLORIDE 200 MCG: 10 INJECTION INTRAVENOUS at 01:12

## 2017-12-30 RX ADMIN — LIDOCAINE HYDROCHLORIDE 60 MG: 20 INJECTION, SOLUTION INTRAVENOUS at 01:12

## 2017-12-30 RX ADMIN — ONDANSETRON 4 MG: 2 INJECTION, SOLUTION INTRAMUSCULAR; INTRAVENOUS at 02:12

## 2017-12-30 RX ADMIN — PIPERACILLIN SODIUM AND TAZOBACTAM SODIUM 4.5 G: 4; .5 INJECTION, POWDER, LYOPHILIZED, FOR SOLUTION INTRAVENOUS at 01:12

## 2017-12-30 RX ADMIN — MIDAZOLAM 2 MG: 1 INJECTION INTRAMUSCULAR; INTRAVENOUS at 01:12

## 2017-12-30 NOTE — ED NOTES
T/c to Dallas Domingo, patient's son; informed him that the patient is being transferred to [facility] room 521 by Hans at this time.

## 2017-12-30 NOTE — TRANSFER OF CARE
Anesthesia Transfer of Care Note    Patient: Patito Domingo    Procedure(s) Performed: Procedure(s) (LRB):  CHOLECYSTECTOMY (N/A)    Patient location: [facility] Surgical Floor    Anesthesia Type: general    Transport from OR: Transported from OR on 6-10 L/min O2 by face mask with adequate spontaneous ventilation    Post pain: adequate analgesia    Post assessment: no apparent anesthetic complications    Post vital signs: stable    Level of consciousness: awake, alert and oriented    Nausea/Vomiting: no nausea/vomiting    Complications: none    Transfer of care protocol was followed      Last vitals:   Visit Vitals  BP (!) 176/78   Pulse 80   Temp 37.2 °C (99 °F) (Oral)   Resp 15   SpO2 100%   Breastfeeding? No

## 2017-12-30 NOTE — OP NOTE
Preop: carcinoid disease with acute cholecystitis    Postop: same    Procedure:open cholecystectomy    GTA  EBL: 50 ml    Ana Lilia Mistry  To PACU, stable  No complications    [id number]

## 2017-12-30 NOTE — H&P
[facility]  General Surgery  Neuroendocrine Tumor Service  History & Physical    Patient Name: Patito Domingo  MRN: [id number]   Admission Date: 12/29/2017  Code Status: Full Code   Attending Provider: Chris Herbert MD   Primary Care Physician: Magdalena Hernandez MD  Principal Problem:Cholecystitis    Patient information was obtained from patient and ER records.     Subjective:     History of Present Illness: Patient is a 68 yo AA female transferred form [facility] that presented with RUQ abdominal pain, nausea and vomiting that significantly worsened one day ago.  Patient has a past medical history significant for neuroendocrine tumors and CT done in ED showed intra-and extrahepatic biliary ductal dilatation with marked distention of the gallbladder as well as numerous additional hepatic masses consistent with worsening metastatic disease.  Patient transferred to us for further evaluation.      Chief Complaint/Reason for Admission: RUQ abdominal pain, nausea and vomiting    Current Facility-Administered Medications on File Prior to Encounter   Medication    [COMPLETED] 0.9%  NaCl infusion    [COMPLETED] diphenhydrAMINE injection 25 mg    [COMPLETED] hydrALAZINE injection 5 mg    [COMPLETED] hydromorphone (PF) injection 1 mg    [COMPLETED] hydromorphone (PF) injection 1 mg    [COMPLETED] hydromorphone (PF) injection 1 mg    [COMPLETED] HYDROmorphone injection 1 mg    [COMPLETED] methylPREDNISolone sodium succinate injection 200 mg    [COMPLETED] morphine injection 4 mg    [COMPLETED] omnipaque 350 iohexol 75 mL    [COMPLETED] ondansetron injection 4 mg    [COMPLETED] piperacillin-tazobactam 4.5 g in dextrose 5 % 100 mL IVPB (ready to mix system)    [COMPLETED] potassium chloride 10 mEq in 100 mL IVPB    [COMPLETED] sodium chloride 0.9% bolus 1,000 mL    [DISCONTINUED] acetaminophen tablet 650 mg    [DISCONTINUED] diphenhydrAMINE injection 25 mg     [DISCONTINUED] gabapentin capsule 100 mg    [DISCONTINUED] hydrALAZINE injection 10 mg    [DISCONTINUED] hydrocodone-acetaminophen 5-325mg per tablet 1 tablet    [DISCONTINUED] lactated ringers infusion    [DISCONTINUED] metoprolol tartrate (LOPRESSOR) tablet 50 mg    [DISCONTINUED] morphine injection 2 mg    [DISCONTINUED] ondansetron disintegrating tablet 8 mg    [DISCONTINUED] pantoprazole EC tablet 40 mg    [DISCONTINUED] promethazine suppository 25 mg    [DISCONTINUED] traZODone tablet 50 mg     Current Outpatient Prescriptions on File Prior to Encounter   Medication Sig    cyanocobalamin 1,000 mcg/mL injection 1,000 mcg every 30 days.     docusate sodium (COLACE) 100 MG capsule Take 100 mg by mouth 2 (two) times daily as needed.     ergocalciferol (VITAMIN D2) 50,000 unit Cap Take 1 capsule (50,000 Units total) by mouth every 7 days.    ferrous gluconate (FERGON) 324 MG tablet TAKE 1 TABLET EVERY DAY WITH BREAKFAST    gabapentin (NEURONTIN) 100 MG capsule Take 100 mg by mouth 2 (two) times daily.    LOPERAMIDE HCL (IMODIUM A-D ORAL) Take by mouth daily as needed.     losartan (COZAAR) 50 MG tablet once daily.     magnesium oxide (MAG-OX) 250 mg Tab Take by mouth once daily.     metoprolol tartrate (LOPRESSOR) 50 MG tablet Take 50 mg by mouth 2 (two) times daily.    nystatin (MYCOSTATIN) 100,000 unit/mL suspension Take 6 mLs (600,000 Units total) by mouth 4 (four) times daily as needed.    ondansetron (ZOFRAN) 8 MG tablet Take by mouth every 8 (eight) hours as needed for Nausea.    oxycodone (ROXICODONE) 30 MG Tab     polyethylene glycol (GLYCOLAX) 17 gram/dose powder     tramadol (ULTRAM) 50 mg tablet Take 50 mg by mouth every 6 (six) hours as needed for Pain.    trazodone (DESYREL) 50 MG tablet Take 50 mg by mouth nightly as needed.     triamcinolone acetonide 0.1% (KENALOG) 0.1 % cream Apply topically 2 (two) times daily.    XIIDRA 5 % Dpet     [DISCONTINUED] NEPAFENAC (ILEVRO  OPHT) Apply 1 drop to eye once daily.     [DISCONTINUED] pantoprazole (PROTONIX) 40 MG tablet Take 40 mg by mouth once daily.    [DISCONTINUED] POLYMYXIN B SULF/TRIMETHOPRIM (POLYTRIM OPHT) Apply 1 drop to eye 4 (four) times daily. Lt eye.    [DISCONTINUED] potassium citrate 15 mEq TbSR     [DISCONTINUED] prednisoLONE acetate (PRED FORTE) 1 % DrpS 1 drop 4 (four) times daily. Lt eye.       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)      Past Surgical History:   Procedure Laterality Date    CATARACT EXTRACTION Left 10/2017    COLON SURGERY      cystoscope      EYE SURGERY      HYSTERECTOMY  5/1996    LITHOTRIPSY      LIVER BIOPSY  9/14    carcinoid     Family History     Problem Relation (Age of Onset)    Alzheimer's disease Father    Cancer Mother    No Known Problems Son, Son, Son, Son    Stroke Sister        Social History Main Topics    Smoking status: Never Smoker    Smokeless tobacco: Never Used    Alcohol use No    Drug use: No    Sexual activity: Not on file     Review of Systems   Constitutional: Negative for chills and fever.   Respiratory: Negative for shortness of breath.    Cardiovascular: Negative for chest pain.   Gastrointestinal: Positive for abdominal pain, nausea and vomiting.     Objective:     Vital Signs (Most Recent):    Vital Signs (24h Range):  Temp:  [95.2 °F (35.1 °C)-97.9 °F (36.6 °C)] 97.8 °F (36.6 °C)  Pulse:  [47-82] 70  Resp:  [16-22] 18  SpO2:  [99 %-100 %] 100 %  BP: (121-219)/(50-95) 167/75        There is no height or  weight on file to calculate BMI.         Physical Exam   Constitutional: She is oriented to person, place, and time. She appears well-developed and well-nourished. No distress.   HENT:   Head: Normocephalic and atraumatic.   Right Ear: External ear normal.   Left Ear: External ear normal.   Nose: Nose normal.   Mouth/Throat: Oropharynx is clear and moist. No oropharyngeal exudate.   Eyes: Conjunctivae and EOM are normal. Pupils are equal, round, and reactive to light. Right eye exhibits no discharge. Left eye exhibits no discharge. No scleral icterus.   Neck: Normal range of motion. No JVD present. No thyromegaly present.   Cardiovascular: Normal rate, regular rhythm, normal heart sounds and intact distal pulses.  Exam reveals no gallop and no friction rub.    No murmur heard.  Pulmonary/Chest: Effort normal and breath sounds normal. No respiratory distress. She has no wheezes. She has no rales. She exhibits no tenderness.   Abdominal: Soft. Bowel sounds are normal. She exhibits no distension and no mass. There is tenderness in the right upper quadrant. There is positive Irby's sign. There is no rebound and no guarding. No hernia.   Musculoskeletal: Normal range of motion.   Neurological: She is alert and oriented to person, place, and time. No cranial nerve deficit.   Skin: Skin is warm and dry. She is not diaphoretic.   Psychiatric: She has a normal mood and affect. Her behavior is normal.   Vitals reviewed.      Significant Labs:  CBC:     Recent Labs  Lab 12/29/17  0003   WBC 11.02   RBC 3.93*   HGB 10.8*   HCT 34.0*      MCV 87   MCH 27.5   MCHC 31.8*     CMP:   Recent Labs  Lab 12/29/17  0003   *   CALCIUM 9.7   ALBUMIN 4.0   PROT 7.6      K 3.2*   CO2 30*      BUN 18*   CREATININE 1.15   ALKPHOS 212*   ALT 36   AST 88*   BILITOT 1.6*     Coagulation:  No results for input(s): LABPROT, INR, APTT in the last 168 hours.  Microbiology Results (last 7 days)     Procedure Component Value  Units Date/Time    Blood culture (site 1) [[id number]] Collected:  12/29/17 2115    Order Status:  Sent Specimen:  Blood Updated:  12/30/17 0209    Narrative:       righy antecubital   12/29/2017    Blood culture (site 2) [[id number]] Collected:  12/29/17 2114    Order Status:  Sent Specimen:  Blood Updated:  12/30/17 0209          Recent Labs  Lab 12/29/17  0118   COLORU Yellow   SPECGRAV >=1.030*   PHUR 6.0   PROTEINUA 1+*   BACTERIA Moderate*   NITRITE Negative   LEUKOCYTESUR Trace*   UROBILINOGEN Negative   HYALINECASTS 0     Lab Results   Component Value Date    LIPASE >4000 (H) 12/29/2017       Significant Diagnostics:  Imaging Results    None       CT Abd/Pelvis    Interval development of numerous additional hepatic masses consistent with worsening metastatic disease.  Intra-and extrahepatic biliary ductal dilatation with marked distention of the gallbladder.  Interval enlargement of mesenteric mass.  Bilateral nephrolithiasis.  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      - Admit to General Surgery  - Start sandostatin ggt  - NPO, IVF, zosyn  - ERCP with stent +/- cholecystostomy     Active Diagnoses:    Diagnosis Date Noted POA    PRINCIPAL PROBLEM:  Cholecystitis [K81.9] 12/29/2017 Yes      Problems Resolved During this Admission:    Diagnosis Date Noted Date Resolved POA     VTE Risk Mitigation         Ordered     Medium Risk of VTE  Once      12/29/17 2042     Place sequential compression device  Until discontinued      12/29/17 2042          Jonathan Yusuf MD  General Surgery  Neuroendocrine Tumor Service  [facility]

## 2017-12-30 NOTE — PROGRESS NOTES
Report received from Joel. Incision intact with edges well approximated. Vital signs taken.  Pt due to void by 9:30 pm.Will continue to monitor.

## 2017-12-30 NOTE — ANESTHESIA PREPROCEDURE EVALUATION
12/30/2017  Patito Domingo is a 67 y.o., female known to have pancreatic neuroendocrine tumor; admitted thru ED w acute biliary track inflammation => open leslie.    PRIOR ANES (in Epic)   2017-10-04 [place] MAC   mid 2, fent 50X3    ANES-RELATED MED/SURG  Patient Active Problem List   Diagnosis    Neuroendocrine carcinoma, unknown primary site    Secondary neuroendocrine tumor of liver    Kidney stones    Carcinoid syndrome    HTN (hypertension)    Multiple thyroid nodules    Sciatica    Secondary neuroendocrine tumor of distant lymph nodes    Functional weakness    Pancreatitis due to biliary obstruction    Acute biliary pancreatitis without infection or necrosis    Cholecystitis     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)      Past Surgical History:   Procedure Laterality Date    CATARACT EXTRACTION Left 10/2017    COLON SURGERY      cystoscope      EYE SURGERY      HYSTERECTOMY  5/1996    LITHOTRIPSY      LIVER BIOPSY  9/14    carcinoid     ALLERGIES  Review of patient's allergies indicates:   Allergen Reactions    Contrast media Hives, Itching and Swelling    Epinephrine      Carcinoid pt    Ibuprofen Hives, Itching and Swelling    Sulfa (sulfonamide antibiotics) Hives, Itching and Swelling     ANES-RELATED MAR  metoprolol pip-tazo  hydralazine-PRN    Anesthesia Evaluation         Review of Systems    Wt Readings from Last 1 Encounters:   12/29/17 70.3 kg (155 lb)     Temp Readings from Last 1 Encounters:   12/30/17 37.2 °C (99 °F) (Oral)     BP Readings from Last 1 Encounters:   12/30/17 (!) 176/79     Pulse Readings from Last 1 Encounters:   12/30/17 85     SpO2 Readings from Last 1  Encounters:   12/30/17 98%       Physical Exam  General:  Obesity    Airway/Jaw/Neck:  Airway Findings: Mouth Opening: Small, but > 3cm Mallampati: III  Improves to II with phonation.  TM Distance: Normal, at least 6 cm  Jaw/Neck Findings:  Neck ROM: Normal ROM      Dental:  Dental Findings:    Chest/Lungs:  Chest/Lungs Findings: Normal Respiratory Rate     Heart/Vascular:  Heart Findings: Rate: Normal  Rhythm: Regular Rhythm            Lab Results   Component Value Date    WBC 13.51 (H) 12/30/2017    WBC 13.51 (H) 12/30/2017    HGB 10.4 (L) 12/30/2017    HGB 10.4 (L) 12/30/2017    HCT 32.9 (L) 12/30/2017    HCT 32.9 (L) 12/30/2017    MCV 85 12/30/2017    MCV 85 12/30/2017     12/30/2017     12/30/2017       Chemistry        Component Value Date/Time     12/30/2017 0552     12/30/2017 0552    K 3.1 (L) 12/30/2017 0552    K 3.1 (L) 12/30/2017 0552     12/30/2017 0552     12/30/2017 0552    CO2 23 12/30/2017 0552    CO2 23 12/30/2017 0552    BUN 23 12/30/2017 0552    BUN 23 12/30/2017 0552    CREATININE 1.3 12/30/2017 0552    CREATININE 1.3 12/30/2017 0552     (H) 12/30/2017 0552     (H) 12/30/2017 0552        Component Value Date/Time    CALCIUM 9.2 12/30/2017 0552    CALCIUM 9.2 12/30/2017 0552    ALKPHOS 219 (H) 12/30/2017 0552    ALKPHOS 219 (H) 12/30/2017 0552    AST 43 (H) 12/30/2017 0552    AST 43 (H) 12/30/2017 0552    AST 38 03/14/2016 1220    ALT 56 (H) 12/30/2017 0552    ALT 56 (H) 12/30/2017 0552    BILITOT 1.2 (H) 12/30/2017 0552    BILITOT 1.2 (H) 12/30/2017 0552    ESTGFRAFRICA 50 (A) 12/30/2017 0552    ESTGFRAFRICA 50 (A) 12/30/2017 0552    EGFRNONAA 43 (A) 12/30/2017 0552    EGFRNONAA 43 (A) 12/30/2017 0552         Lab Results   Component Value Date    ALBUMIN 2.8 (L) 12/30/2017    ALBUMIN 2.8 (L) 12/30/2017      Lab Results   Component Value Date    TSH 0.896 03/14/2016     Lab Results   Component Value Date    INR 1.0 12/30/2017     Lab Results    Component Value Date    APTT 26.1 12/30/2017     CXR 2017-12-29  No acute chest disease identified.    EKG 2017-12-29  Normal  unconfirmed    ECHO 2017-10-30    1 - Normal LV systolic function (EF 60-65%).     2 - Normal left ventricular diastolic function.     3 - Concentric hypertrophy.     Anesthesia Plan  Type of Anesthesia, risks & benefits discussed:  Anesthesia Type:  general  Patient's Preference:   Intra-op Monitoring Plan: standard ASA monitors  Intra-op Monitoring Plan Comments:   Post Op Pain Control Plan: multimodal analgesia  Post Op Pain Control Plan Comments:   Induction:   IV  Beta Blocker:  Patient is on a Beta-Blocker and has received one dose within the past 24 hours (No further documentation required).       Informed Consent: Patient understands risks and agrees with Anesthesia plan.  Questions answered. Anesthesia consent signed with patient.  ASA Score: 2     Day of Surgery Review of History & Physical:    H&P update referred to the surgeon.         Ready For Surgery From Anesthesia Perspective.

## 2017-12-30 NOTE — ANESTHESIA RELEASE NOTE
Anesthesia Release from PACU Note    Patient: Patito Domingo    Procedure(s) Performed: Procedure(s) (LRB):  CHOLECYSTECTOMY (N/A)    Anesthesia type: general    Post pain: Adequate analgesia    Post assessment: no apparent anesthetic complications    Last Vitals:   Visit Vitals  BP (!) 164/72 (BP Location: Left arm, Patient Position: Lying)   Pulse 98   Temp 36.7 °C (98 °F) (Oral)   Resp 17   SpO2 98%   Breastfeeding? No       Post vital signs: stable    Level of consciousness: awake and oriented    Nausea/Vomiting: no nausea/no vomiting    Complications: none    Airway Patency: patent    Respiratory: unassisted, spontaneous ventilation, room air    Cardiovascular: stable    Hydration: euvolemic

## 2017-12-30 NOTE — PROGRESS NOTES
[facility]  General Surgery  Neuroendocrine Tumor Service  Progress Note    Subjective:     Interval History: NAEON.  AFVSS. Patient with continued RUQ abdominal pain.  Denies N/V.     Post-Op Info:  * No surgery found *          Medications:  Continuous Infusions:   sodium chloride 0.9% Stopped (12/30/17 0830)    octreotide (SANDOSTATIN) infusion 250 mcg/hr (12/29/17 2143)     Scheduled Meds:   piperacillin-tazobactam 4.5 g in sodium chloride 0.9% 100 mL IVPB (ready to mix system)  4.5 g Intravenous Q8H     PRN Meds:dextrose 50%, dextrose 50%, diphenhydrAMINE, glucagon (human recombinant), glucose, glucose, hydrALAZINE, hydromorphone (PF), insulin aspart, sodium chloride 0.9%     Objective:     Vital Signs (Most Recent):  Temp: 99 °F (37.2 °C) (12/30/17 0732)  Pulse: 94 (12/30/17 0732)  Resp: 18 (12/30/17 0732)  BP: (!) 168/72 (12/30/17 0732)  SpO2: 98 % (12/30/17 0435) Vital Signs (24h Range):  Temp:  [97.8 °F (36.6 °C)-99 °F (37.2 °C)] 99 °F (37.2 °C)  Pulse:  [55-94] 94  Resp:  [18] 18  SpO2:  [98 %-100 %] 98 %  BP: (160-219)/(67-95) 168/72          Physical Exam   Constitutional: She is oriented to person, place, and time. She appears well-developed and well-nourished. No distress.   HENT:   Head: Normocephalic and atraumatic.   Right Ear: External ear normal.   Left Ear: External ear normal.   Nose: Nose normal.   Mouth/Throat: Oropharynx is clear and moist. No oropharyngeal exudate.   Eyes: Conjunctivae and EOM are normal. Pupils are equal, round, and reactive to light. Right eye exhibits no discharge. Left eye exhibits no discharge. No scleral icterus.   Neck: Normal range of motion. No JVD present. No thyromegaly present.   Cardiovascular: Normal rate, regular rhythm, normal heart sounds and intact distal pulses.  Exam reveals no gallop and no friction rub.    No murmur heard.  Pulmonary/Chest: Effort normal and breath sounds normal. No respiratory distress. She has no wheezes. She has  no rales. She exhibits no tenderness.   Abdominal: Soft. Bowel sounds are normal. She exhibits no distension and no mass. There is tenderness in the right upper quadrant. There is positive Irby's sign. No hernia.   Musculoskeletal: Normal range of motion.   Neurological: She is alert and oriented to person, place, and time. No cranial nerve deficit.   Skin: Skin is warm and dry. She is not diaphoretic.   Psychiatric: She has a normal mood and affect. Her behavior is normal.   Vitals reviewed.      Significant Labs:  CBC:     Recent Labs  Lab 12/30/17  0552   WBC 13.51*  13.51*   RBC 3.89*  3.89*   HGB 10.4*  10.4*   HCT 32.9*  32.9*     204   MCV 85  85   MCH 26.7*  26.7*   MCHC 31.6*  31.6*     CMP:   Recent Labs  Lab 12/30/17  0552   *  151*   CALCIUM 9.2  9.2   ALBUMIN 2.8*  2.8*   PROT 6.7  6.7     141   K 3.1*  3.1*   CO2 23  23     105   BUN 23  23   CREATININE 1.3  1.3   ALKPHOS 219*  219*   ALT 56*  56*   AST 43*  43*   BILITOT 1.2*  1.2*   MG 1.2*   PHOS 3.6     Coagulation:    Recent Labs  Lab 12/30/17  0552   LABPROT 10.9   INR 1.0   APTT 26.1     Lab Results   Component Value Date    AMYLASE 399 (H) 12/30/2017     Lab Results   Component Value Date    LIPASE 727 (H) 12/30/2017     Significant Diagnostics:  Imaging Results    None         Assessment/Plan:   Patient is a 66 yo F with gallbladder distension and pancreatitis due to multiple metastatic liver NETs.      - Start sandostatin ggt  - NPO, IVF, zosyn  - lipase trending down >4000 ===> 727  - will get MRCP followed by HIDA scan     Active Diagnoses:    Diagnosis Date Noted POA    PRINCIPAL PROBLEM:  Cholecystitis [K81.9] 12/29/2017 Yes      Problems Resolved During this Admission:    Diagnosis Date Noted Date Resolved POA       Jonathan Yusuf MD  General Surgery  Neuroendocrine Tumor Service  [facility]

## 2017-12-30 NOTE — ANESTHESIA POSTPROCEDURE EVALUATION
Anesthesia Post Evaluation    Patient: Patito Domingo    Procedure(s) Performed: Procedure(s) (LRB):  CHOLECYSTECTOMY (N/A)    Final Anesthesia Type: general  Patient location during evaluation: PACU  Patient participation: Yes- Able to Participate  Level of consciousness: awake  Post-procedure vital signs: reviewed and stable  Pain management: adequate  PONV status at discharge: No PONV  Anesthetic complications: no      Cardiovascular status: stable  Respiratory status: unassisted, spontaneous ventilation and room air  Hydration status: euvolemic  Follow-up not needed.        Visit Vitals  BP (!) 164/72 (BP Location: Left arm, Patient Position: Lying)   Pulse 98   Temp 36.7 °C (98 °F) (Oral)   Resp 17   SpO2 98%   Breastfeeding? No       Pain/Alina Score: Pain Assessment Performed: Yes (12/30/2017  9:55 AM)  Presence of Pain: complains of pain/discomfort (12/30/2017  4:05 PM)  Pain Rating Prior to Med Admin: 6 (12/30/2017  4:03 PM)  Alina Score: 10 (12/30/2017  4:05 PM)

## 2017-12-30 NOTE — INTERVAL H&P NOTE
The patient has been examined and the H&P has been reviewed:    I concur with the findings and no changes have occurred since H&P was written.    Anesthesia/Surgery risks, benefits and alternative options discussed and understood by patient/family.          Active Hospital Problems    Diagnosis  POA    *Cholecystitis [K81.9]  Yes      Resolved Hospital Problems    Diagnosis Date Resolved POA   No resolved problems to display.

## 2017-12-31 LAB
ALBUMIN SERPL BCP-MCNC: 2.4 G/DL
ALP SERPL-CCNC: 138 U/L
ALT SERPL W/O P-5'-P-CCNC: 44 U/L
ANION GAP SERPL CALC-SCNC: 10 MMOL/L
AST SERPL-CCNC: 33 U/L
BASOPHILS # BLD AUTO: 0.01 K/UL
BASOPHILS NFR BLD: 0.1 %
BILIRUB SERPL-MCNC: 0.7 MG/DL
BUN SERPL-MCNC: 18 MG/DL
CALCIUM SERPL-MCNC: 8.2 MG/DL
CHLORIDE SERPL-SCNC: 106 MMOL/L
CO2 SERPL-SCNC: 24 MMOL/L
CREAT SERPL-MCNC: 1 MG/DL
DIFFERENTIAL METHOD: ABNORMAL
EOSINOPHIL # BLD AUTO: 0 K/UL
EOSINOPHIL NFR BLD: 0 %
ERYTHROCYTE [DISTWIDTH] IN BLOOD BY AUTOMATED COUNT: 15 %
EST. GFR  (AFRICAN AMERICAN): >60 ML/MIN/1.73 M^2
EST. GFR  (NON AFRICAN AMERICAN): 59 ML/MIN/1.73 M^2
GLUCOSE SERPL-MCNC: 184 MG/DL
HCT VFR BLD AUTO: 27.9 %
HGB BLD-MCNC: 8.9 G/DL
LYMPHOCYTES # BLD AUTO: 0.4 K/UL
LYMPHOCYTES NFR BLD: 4.1 %
MAGNESIUM SERPL-MCNC: 1.3 MG/DL
MCH RBC QN AUTO: 27.4 PG
MCHC RBC AUTO-ENTMCNC: 31.9 G/DL
MCV RBC AUTO: 86 FL
MONOCYTES # BLD AUTO: 1.2 K/UL
MONOCYTES NFR BLD: 11.1 %
NEUTROPHILS # BLD AUTO: 8.7 K/UL
NEUTROPHILS NFR BLD: 84.2 %
PHOSPHATE SERPL-MCNC: 2 MG/DL
PLATELET # BLD AUTO: 171 K/UL
PMV BLD AUTO: 10.5 FL
POCT GLUCOSE: 175 MG/DL (ref 70–110)
POTASSIUM SERPL-SCNC: 3.4 MMOL/L
PROT SERPL-MCNC: 5.7 G/DL
RBC # BLD AUTO: 3.25 M/UL
SODIUM SERPL-SCNC: 140 MMOL/L
WBC # BLD AUTO: 10.38 K/UL

## 2017-12-31 PROCEDURE — 63600175 PHARM REV CODE 636 W HCPCS: Performed by: FAMILY MEDICINE

## 2017-12-31 PROCEDURE — 84100 ASSAY OF PHOSPHORUS: CPT

## 2017-12-31 PROCEDURE — 25000003 PHARM REV CODE 250: Performed by: STUDENT IN AN ORGANIZED HEALTH CARE EDUCATION/TRAINING PROGRAM

## 2017-12-31 PROCEDURE — 94799 UNLISTED PULMONARY SVC/PX: CPT

## 2017-12-31 PROCEDURE — 36415 COLL VENOUS BLD VENIPUNCTURE: CPT

## 2017-12-31 PROCEDURE — 63600175 PHARM REV CODE 636 W HCPCS: Performed by: STUDENT IN AN ORGANIZED HEALTH CARE EDUCATION/TRAINING PROGRAM

## 2017-12-31 PROCEDURE — 85025 COMPLETE CBC W/AUTO DIFF WBC: CPT

## 2017-12-31 PROCEDURE — 63600175 PHARM REV CODE 636 W HCPCS: Performed by: SURGERY

## 2017-12-31 PROCEDURE — 80053 COMPREHEN METABOLIC PANEL: CPT

## 2017-12-31 PROCEDURE — 11000001 HC ACUTE MED/SURG PRIVATE ROOM

## 2017-12-31 PROCEDURE — 83735 ASSAY OF MAGNESIUM: CPT

## 2017-12-31 PROCEDURE — 25000003 PHARM REV CODE 250: Performed by: SURGERY

## 2017-12-31 PROCEDURE — 94761 N-INVAS EAR/PLS OXIMETRY MLT: CPT

## 2017-12-31 RX ORDER — SIMETHICONE 125 MG
125 TABLET,CHEWABLE ORAL EVERY 6 HOURS PRN
Status: DISCONTINUED | OUTPATIENT
Start: 2017-12-31 | End: 2018-01-02 | Stop reason: HOSPADM

## 2017-12-31 RX ORDER — ENOXAPARIN SODIUM 100 MG/ML
40 INJECTION SUBCUTANEOUS DAILY
Status: DISCONTINUED | OUTPATIENT
Start: 2017-12-31 | End: 2018-01-02 | Stop reason: HOSPADM

## 2017-12-31 RX ORDER — LABETALOL HYDROCHLORIDE 5 MG/ML
10 INJECTION, SOLUTION INTRAVENOUS EVERY 6 HOURS PRN
Status: DISCONTINUED | OUTPATIENT
Start: 2017-12-31 | End: 2018-01-02 | Stop reason: HOSPADM

## 2017-12-31 RX ADMIN — Medication 125 MG: at 08:12

## 2017-12-31 RX ADMIN — POTASSIUM PHOSPHATE, MONOBASIC AND POTASSIUM PHOSPHATE, DIBASIC 30 MMOL: 224; 236 INJECTION, SOLUTION, CONCENTRATE INTRAVENOUS at 03:12

## 2017-12-31 RX ADMIN — ENOXAPARIN SODIUM 40 MG: 100 INJECTION SUBCUTANEOUS at 03:12

## 2017-12-31 RX ADMIN — HYDROMORPHONE HYDROCHLORIDE 1 MG: 2 INJECTION INTRAMUSCULAR; INTRAVENOUS; SUBCUTANEOUS at 05:12

## 2017-12-31 RX ADMIN — HYDROMORPHONE HYDROCHLORIDE 1 MG: 2 INJECTION INTRAMUSCULAR; INTRAVENOUS; SUBCUTANEOUS at 12:12

## 2017-12-31 RX ADMIN — HYDROMORPHONE HYDROCHLORIDE 1 MG: 2 INJECTION INTRAMUSCULAR; INTRAVENOUS; SUBCUTANEOUS at 04:12

## 2017-12-31 RX ADMIN — OXYCODONE AND ACETAMINOPHEN 2 TABLET: 5; 325 TABLET ORAL at 08:12

## 2017-12-31 RX ADMIN — OXYCODONE AND ACETAMINOPHEN 2 TABLET: 5; 325 TABLET ORAL at 02:12

## 2017-12-31 RX ADMIN — HYDROMORPHONE HYDROCHLORIDE 1 MG: 2 INJECTION INTRAMUSCULAR; INTRAVENOUS; SUBCUTANEOUS at 09:12

## 2017-12-31 RX ADMIN — DEXTROSE MONOHYDRATE, SODIUM CHLORIDE, AND POTASSIUM CHLORIDE: 50; 4.5; 1.49 INJECTION, SOLUTION INTRAVENOUS at 11:12

## 2017-12-31 RX ADMIN — OXYCODONE AND ACETAMINOPHEN 2 TABLET: 5; 325 TABLET ORAL at 11:12

## 2017-12-31 RX ADMIN — METOPROLOL TARTRATE 50 MG: 50 TABLET, FILM COATED ORAL at 08:12

## 2017-12-31 RX ADMIN — HYDRALAZINE HYDROCHLORIDE 10 MG: 20 INJECTION INTRAMUSCULAR; INTRAVENOUS at 11:12

## 2017-12-31 RX ADMIN — ERTAPENEM SODIUM 1 G: 1 INJECTION, POWDER, LYOPHILIZED, FOR SOLUTION INTRAMUSCULAR; INTRAVENOUS at 09:12

## 2017-12-31 RX ADMIN — LOSARTAN POTASSIUM 50 MG: 50 TABLET, FILM COATED ORAL at 08:12

## 2017-12-31 RX ADMIN — PIPERACILLIN SODIUM AND TAZOBACTAM SODIUM 4.5 G: 4; .5 INJECTION, POWDER, LYOPHILIZED, FOR SOLUTION INTRAVENOUS at 05:12

## 2017-12-31 RX ADMIN — LABETALOL HYDROCHLORIDE 10 MG: 5 INJECTION, SOLUTION INTRAVENOUS at 03:12

## 2017-12-31 RX ADMIN — OCTREOTIDE ACETATE 250 MCG/HR: 1000 INJECTION, SOLUTION INTRAVENOUS; SUBCUTANEOUS at 02:12

## 2017-12-31 RX ADMIN — HYDRALAZINE HYDROCHLORIDE 10 MG: 20 INJECTION INTRAMUSCULAR; INTRAVENOUS at 05:12

## 2017-12-31 NOTE — OP NOTE
DATE OF PROCEDURE:  12/30/2017.    PREOPERATIVE DIAGNOSIS:  The patient with a metastatic carcinoid disease to the   liver with significant carcinoid disease with acute cholecystitis status post   small bowel resection.    POSTOPERATIVE DIAGNOSIS:  Acute large giant cholecystitis with carcinoid disease   in the liver, mesentery proceeded and open cholecystectomy.    ANESTHESIA:  General endotracheal anesthesia.    SURGEON:  Chata Perez M.D.    SURGERY RESIDENT:  Dr. Terri Mistry.    DISPOSITION:  To PACU stable.    COMPLICATIONS:  No complication.    HISTORY AND INDICATION:  This is a 67-year-old female who was diagnosed to have   a metastatic carcinoid disease with the liver full of mets and was seen by Dr. Sánchez last year was lost for followup.  She apparently had a small bowel   resection for small-bowel obstruction last year and had not called back and   followed up in our clinic.  She went to [facility] yesterday with severe   right upper quadrant pain, nausea and vomiting.  The patient was transferred to   [facility] in [place] and from there she was transferred back   to this place.  She has continued to a have significant right upper quadrant   pain with nausea and vomiting.  During evaluation, the patient was found to have   significantly large gallbladder with the bile duct dilation.  She underwent an   MRCP, which shows bile duct of normal caliber.  Also, she had some amylase   elevation during her hospitalization.  Because she was significantly tender in   the right upper quadrant and her bilirubin was not significantly elevated, my   diagnosis was acute cholecystitis rather than bile duct obstruction.  I examined   her and she had exquisite tenderness in the right upper quadrant.  Therefore, I   talked to her about the surgery because of a giant gallbladder and she has had   a previous ex-lap with a small bowel resection.  I decided to proceed with open    cholecystectomy.  I elaborately went over with her about the risk of the surgery   such as bleeding, infection, DVT, PE, MI, stroke, bile duct injury requiring   more surgeries, carcinoid crisis, etc.  Following admission, she was put on an   octreotide infusion.  She has never been on lanreotide before.    FINDINGS:  The patient had a significantly inflamed giant cholecystitis.    PROCEDURE IN DETAIL:  The patient was brought to the Operating Room with the   Sandostatin infusion on board.  She was placed in the supine position.  She was   then sedated and intubated.  A De La Cruz catheter was placed in the bladder.  The   abdomen was prepped and draped in the usual sterile manner.  A timeout was given   to verify the ID of the patient and the procedure.  A right upper quadrant   incision was made.  Incision was extended deep.  The superficial fascia was   incised.  The rectus muscles were transected followed by the posterior fascia.    The abdominal cavity was entered.  There were some adhesions in the right upper   quadrant that was taken down.  The gallbladder was found to be going all the way   up to the iliac crest and was found to be significantly inflamed and thick.    Meticulous dissection was done.  The fundus of the gallbladder was dissected   out.  It was pulled out from the right lower quadrant.  The liver was found to   have multiple mets all over.  Meticulous dissection was done.  The gallbladder   was dissected all around.  The cystic duct and cystic artery was identified.    Cystic artery was ligated.  I placed a clamp on the Julio Cesar's pouch and then   the cystic duct was opened as high as possible.  I placed a cholangiogram   catheter and tried to flush; however, the catheter would not advance.  After   some point, I decided to flush the catheter as much as possible and terminate   the procedure.  Following this, I flushed with normal saline and then ligated   the cystic duct securely using silk tie  and then clips.  Following this,   meticulous hemostasis was accomplished all around.  The right upper quadrant was   irrigated and was drained completely.  The liver bed and the gallbladder bed   was sprayed with Jeff followed by placement of Surgicel.  The posterior fascia   was approximated with running Prolene stitch.  The anterior fascia was   approximated with #1 Ethibond.  Marcaine and Exparel was infiltrated at all the   incisional area.  After ensuring adequate hemostasis and sponge counts were   correct, the incision was closed with 4-0 Monocryl.  Blood loss was less than 50   mL.  The patient was stable, extubated, taken to Recovery Room in stable   condition.      MACY  dd: 12/30/2017 15:11:26 (CST)  td: 12/30/2017 22:08:27 (CST)  Doc ID   #[id number]  Job ID #[id number]    CC:

## 2017-12-31 NOTE — PLAN OF CARE
Patient presents with    Abdominal Pain       pt sent form [facility] for mass pressing on biliary artery     Pt independent with ADLs, No HH, has RW but not using. Lives with adult son, Matias Perez Jr. Has supportive family who will provide transportation on discharge    Future Appointments  Date Time Provider Department Center   1/23/2018 9:00 AM Magdalena Hernandez MD [facility]            12/31/17 0820   Discharge Assessment   Assessment Type Discharge Planning Assessment   Confirmed/corrected address and phone number on facesheet? Yes   Assessment information obtained from? Patient;Caregiver  (pt and sons Matias and Dallas)   Expected Length of Stay (days) 2   Communicated expected length of stay with patient/caregiver yes   Prior to hospitilization cognitive status: Alert/Oriented   Prior to hospitalization functional status: Independent   Current cognitive status: Alert/Oriented   Current Functional Status: Independent   Facility Arrived From: [facility]   Lives With child(katherine), adult  (son)   Able to Return to Prior Arrangements yes   Is patient able to care for self after discharge? Yes   Who are your caregiver(s) and their phone number(s)? son: Matias Domingo [phone]   Patient's perception of discharge disposition home or selfcare   Readmission Within The Last 30 Days no previous admission in last 30 days   Patient currently being followed by outpatient case management? No   Patient currently receives any other outside agency services? No   Equipment Currently Used at Home walker, rolling  (has RW but not currently using)   Do you have any problems affording any of your prescribed medications? No   Is the patient taking medications as prescribed? yes   Does the patient have transportation home? Yes   Transportation Available family or friend will provide   Dialysis Name and Scheduled days N/A   Does the patient receive services at the Coumadin Clinic?  No   Discharge Plan A Home   Discharge Plan B Home with family   Patient/Family In Agreement With Plan yes     Marcie Koehler, RN Transitional Navigator  [phone]

## 2017-12-31 NOTE — PROGRESS NOTES
NEUROENDOCRINE TUMOR SERVICE  Progress Note    No acute events since surgery  Complains of RUQ incisional pain but improvement in intra-abdominal pain that she had pre-operatively  Minimal po intake, no nausea/vomiting  Voiding without issue, getting OOB    Temp:  [97.9 °F (36.6 °C)-98.3 °F (36.8 °C)] 97.9 °F (36.6 °C)  Pulse:  [] 95  Resp:  [15-18] 18  SpO2:  [97 %-100 %] 97 %  BP: (155-184)/(62-84) 155/62    I/O last 3 completed shifts:  In: 2225 [I.V.:1725; IV Piggyback:500]  Out: 1250 [Urine:1100; Blood:150]    EXAM  Awake & alert, mild distress due to pain  Non-labored breathing on room air  Abd soft, ND, appropriately TTP in RUQ  Incision with dermabond intact, edges well-approximated    LABS  WBC 10 (improved)  H/H 8.9/27    K 3.4  Phos 2.0  Mg 1.3    ASSESSMENT/PLAN  67 year old female with metastatic neuroendocrine tumor, cholecystitis s/p open cholecystectomy POD #1  -Pain control  -Advance diet as tolerated  -Replace electrolytes  -Encourage IS, ambulation  -SCDs for DVT ppx, will discuss starting Lovenox with staff      Keena Mistry MD  [employer] General Surgery PGY-2

## 2017-12-31 NOTE — PLAN OF CARE
Problem: Patient Care Overview  Goal: Plan of Care Review  Outcome: Ongoing (interventions implemented as appropriate)  Pt AAOx4. Complaints of abdomen pain with dilaudid and percocet given per orders. Cholecystectomy today. LUQ incision intact with edges well approximated. Pt due to void by 9:30 PM. Clear liquid diet tolerated well. Hydralazine given PRn for blood pressure. Safety maintained. Will continue to monitor.

## 2018-01-01 LAB
ALBUMIN SERPL BCP-MCNC: 2.3 G/DL
ALP SERPL-CCNC: 109 U/L
ALT SERPL W/O P-5'-P-CCNC: 28 U/L
ANION GAP SERPL CALC-SCNC: 9 MMOL/L
AST SERPL-CCNC: 13 U/L
BASOPHILS # BLD AUTO: 0.01 K/UL
BASOPHILS NFR BLD: 0.1 %
BILIRUB SERPL-MCNC: 0.6 MG/DL
BUN SERPL-MCNC: 14 MG/DL
CALCIUM SERPL-MCNC: 8.6 MG/DL
CHLORIDE SERPL-SCNC: 106 MMOL/L
CO2 SERPL-SCNC: 23 MMOL/L
CREAT SERPL-MCNC: 0.8 MG/DL
DIFFERENTIAL METHOD: ABNORMAL
EOSINOPHIL # BLD AUTO: 0.1 K/UL
EOSINOPHIL NFR BLD: 0.8 %
ERYTHROCYTE [DISTWIDTH] IN BLOOD BY AUTOMATED COUNT: 15.1 %
EST. GFR  (AFRICAN AMERICAN): >60 ML/MIN/1.73 M^2
EST. GFR  (NON AFRICAN AMERICAN): >60 ML/MIN/1.73 M^2
GLUCOSE SERPL-MCNC: 158 MG/DL
HCT VFR BLD AUTO: 33.3 %
HGB BLD-MCNC: 10.2 G/DL
LYMPHOCYTES # BLD AUTO: 0.8 K/UL
LYMPHOCYTES NFR BLD: 7.6 %
MAGNESIUM SERPL-MCNC: 1.3 MG/DL
MCH RBC QN AUTO: 26.6 PG
MCHC RBC AUTO-ENTMCNC: 30.6 G/DL
MCV RBC AUTO: 87 FL
MONOCYTES # BLD AUTO: 1.3 K/UL
MONOCYTES NFR BLD: 11.9 %
NEUTROPHILS # BLD AUTO: 8.8 K/UL
NEUTROPHILS NFR BLD: 79.2 %
PHOSPHATE SERPL-MCNC: 2.2 MG/DL
PLATELET # BLD AUTO: 164 K/UL
PMV BLD AUTO: 10.8 FL
POTASSIUM SERPL-SCNC: 3.8 MMOL/L
PROT SERPL-MCNC: 6 G/DL
RBC # BLD AUTO: 3.84 M/UL
SODIUM SERPL-SCNC: 138 MMOL/L
WBC # BLD AUTO: 11.07 K/UL

## 2018-01-01 PROCEDURE — 25000003 PHARM REV CODE 250: Performed by: SURGERY

## 2018-01-01 PROCEDURE — 83735 ASSAY OF MAGNESIUM: CPT

## 2018-01-01 PROCEDURE — 11000001 HC ACUTE MED/SURG PRIVATE ROOM

## 2018-01-01 PROCEDURE — 94799 UNLISTED PULMONARY SVC/PX: CPT

## 2018-01-01 PROCEDURE — 63600175 PHARM REV CODE 636 W HCPCS: Performed by: STUDENT IN AN ORGANIZED HEALTH CARE EDUCATION/TRAINING PROGRAM

## 2018-01-01 PROCEDURE — 94761 N-INVAS EAR/PLS OXIMETRY MLT: CPT

## 2018-01-01 PROCEDURE — 99900038 HC OT GENERIC THERAPY SCREENING (STAT)

## 2018-01-01 PROCEDURE — 63600175 PHARM REV CODE 636 W HCPCS: Performed by: SURGERY

## 2018-01-01 PROCEDURE — 63600175 PHARM REV CODE 636 W HCPCS: Performed by: FAMILY MEDICINE

## 2018-01-01 PROCEDURE — 84100 ASSAY OF PHOSPHORUS: CPT

## 2018-01-01 PROCEDURE — 25000003 PHARM REV CODE 250: Performed by: STUDENT IN AN ORGANIZED HEALTH CARE EDUCATION/TRAINING PROGRAM

## 2018-01-01 PROCEDURE — 36415 COLL VENOUS BLD VENIPUNCTURE: CPT

## 2018-01-01 PROCEDURE — 85025 COMPLETE CBC W/AUTO DIFF WBC: CPT

## 2018-01-01 PROCEDURE — 80053 COMPREHEN METABOLIC PANEL: CPT

## 2018-01-01 RX ORDER — NIFEDIPINE 30 MG/1
30 TABLET, EXTENDED RELEASE ORAL DAILY
Status: DISCONTINUED | OUTPATIENT
Start: 2018-01-01 | End: 2018-01-02 | Stop reason: HOSPADM

## 2018-01-01 RX ORDER — LANOLIN ALCOHOL/MO/W.PET/CERES
400 CREAM (GRAM) TOPICAL EVERY 4 HOURS
Status: COMPLETED | OUTPATIENT
Start: 2018-01-01 | End: 2018-01-01

## 2018-01-01 RX ORDER — AMOXICILLIN 250 MG
1 CAPSULE ORAL 2 TIMES DAILY
Status: DISCONTINUED | OUTPATIENT
Start: 2018-01-01 | End: 2018-01-02 | Stop reason: HOSPADM

## 2018-01-01 RX ADMIN — HYDROMORPHONE HYDROCHLORIDE 1 MG: 2 INJECTION INTRAMUSCULAR; INTRAVENOUS; SUBCUTANEOUS at 08:01

## 2018-01-01 RX ADMIN — STANDARDIZED SENNA CONCENTRATE AND DOCUSATE SODIUM 1 TABLET: 8.6; 5 TABLET, FILM COATED ORAL at 10:01

## 2018-01-01 RX ADMIN — OXYCODONE AND ACETAMINOPHEN 2 TABLET: 5; 325 TABLET ORAL at 11:01

## 2018-01-01 RX ADMIN — METOPROLOL TARTRATE 50 MG: 50 TABLET, FILM COATED ORAL at 08:01

## 2018-01-01 RX ADMIN — STANDARDIZED SENNA CONCENTRATE AND DOCUSATE SODIUM 1 TABLET: 8.6; 5 TABLET, FILM COATED ORAL at 08:01

## 2018-01-01 RX ADMIN — LOSARTAN POTASSIUM 50 MG: 50 TABLET, FILM COATED ORAL at 08:01

## 2018-01-01 RX ADMIN — ENOXAPARIN SODIUM 40 MG: 100 INJECTION SUBCUTANEOUS at 08:01

## 2018-01-01 RX ADMIN — POTASSIUM PHOSPHATE, MONOBASIC 500 MG: 500 TABLET, SOLUBLE ORAL at 11:01

## 2018-01-01 RX ADMIN — HYDROMORPHONE HYDROCHLORIDE 1 MG: 2 INJECTION INTRAMUSCULAR; INTRAVENOUS; SUBCUTANEOUS at 04:01

## 2018-01-01 RX ADMIN — OCTREOTIDE ACETATE 250 MCG/HR: 1000 INJECTION, SOLUTION INTRAVENOUS; SUBCUTANEOUS at 04:01

## 2018-01-01 RX ADMIN — MAGNESIUM OXIDE TAB 400 MG (241.3 MG ELEMENTAL MG) 400 MG: 400 (241.3 MG) TAB at 01:01

## 2018-01-01 RX ADMIN — NIFEDIPINE 30 MG: 30 TABLET, FILM COATED, EXTENDED RELEASE ORAL at 11:01

## 2018-01-01 RX ADMIN — ERTAPENEM SODIUM 1 G: 1 INJECTION, POWDER, LYOPHILIZED, FOR SOLUTION INTRAMUSCULAR; INTRAVENOUS at 09:01

## 2018-01-01 RX ADMIN — HYDROMORPHONE HYDROCHLORIDE 1 MG: 2 INJECTION INTRAMUSCULAR; INTRAVENOUS; SUBCUTANEOUS at 12:01

## 2018-01-01 RX ADMIN — HYDRALAZINE HYDROCHLORIDE 10 MG: 20 INJECTION INTRAMUSCULAR; INTRAVENOUS at 12:01

## 2018-01-01 RX ADMIN — Medication 125 MG: at 11:01

## 2018-01-01 RX ADMIN — OXYCODONE AND ACETAMINOPHEN 2 TABLET: 5; 325 TABLET ORAL at 06:01

## 2018-01-01 RX ADMIN — OXYCODONE AND ACETAMINOPHEN 2 TABLET: 5; 325 TABLET ORAL at 04:01

## 2018-01-01 RX ADMIN — MAGNESIUM OXIDE TAB 400 MG (241.3 MG ELEMENTAL MG) 400 MG: 400 (241.3 MG) TAB at 11:01

## 2018-01-01 RX ADMIN — HYDROMORPHONE HYDROCHLORIDE 1 MG: 2 INJECTION INTRAMUSCULAR; INTRAVENOUS; SUBCUTANEOUS at 02:01

## 2018-01-01 NOTE — PROGRESS NOTES
NEUROENDOCRINE TUMOR SERVICE  Progress Note    No acute events.  Pain control improved. Complains of some gas pain.  Patient persistently hypertensive, up to . Given home meds and prn anti-hypertensives.  Tolerating clear liquid diet. Voiding without issue and getting OOB. Using IS.   Temp 100.7 this AM.    Temp:  [98.6 °F (37 °C)-100.7 °F (38.2 °C)] 100.6 °F (38.1 °C)  Pulse:  [75-89] 86  Resp:  [17-21] 17  SpO2:  [95 %-97 %] 97 %  BP: (149-209)/(63-89) 197/82    I/O last 3 completed shifts:  In: 4398.4 [I.V.:3798.4; IV Piggyback:600]  Out: 2700 [Urine:2700]    EXAM  Awake & alert, NAD  Non-labored breathing on room air  Abd soft, ND, appropriately TTP in RUQ  Incision with dermabond intact, edges well-approximated    LABS  WBC 11  H/H 10/33    K 3.8  Phos 2.2  Mg 1.3  Tbili 0.6    ASSESSMENT/PLAN  67 year old female with metastatic neuroendocrine tumor, cholecystitis s/p open cholecystectomy POD #2  -Invanz started for multi drug resistant E. Coli in urine, will continue to monitor for fevers, increasing WBC count  -Continue to monitor BP, anti-hypertensives as needed  -Pain control  -Advance diet as tolerated  -Replace electrolytes prn  -Encourage IS, ambulation  -Lovenox for DVT ppx      Keena Mistry MD  [employer] General Surgery PGY-2

## 2018-01-01 NOTE — PLAN OF CARE
"Problem: Patient Care Overview  Goal: Plan of Care Review  Outcome: Ongoing (interventions implemented as appropriate)  Pt AAOX4. Complains of constant pain. Denies n/v. Pt complains of "feeling like something is moving in her stomach like gas." Pt educated on the importance of ambulation during her hospital stay. Safety maintained. No distress noted. Bed in lowest locked position. Will continue to monitor.      "

## 2018-01-01 NOTE — PT/OT/SLP PROGRESS
Occupational Therapy  Screen     Patient Name:  Patito Domingo   MRN:  [id number]    Patient participating in screen this date; Pt reports living with her son in [facility], threshold to enter, tub/shower combo. Pt reports mod I at home for ADLs and functional mobility. Uses TTB for bathing and RW for ambulation. Pt also owns BSC that she currently does not use. Pt relies on son for transportation. After interview, pt reporting 8/10 abdominal pain and declining OOB axs. Pt required total A of 2 for scooting to HOB and repositioning for comfort.   Will continue to assess for needs tomorrow's date.    Brooke Phan, OT  1/1/2018

## 2018-01-02 VITALS
OXYGEN SATURATION: 100 % | BODY MASS INDEX: 24.94 KG/M2 | HEART RATE: 66 BPM | WEIGHT: 155.19 LBS | TEMPERATURE: 99 F | RESPIRATION RATE: 18 BRPM | DIASTOLIC BLOOD PRESSURE: 66 MMHG | HEIGHT: 66 IN | SYSTOLIC BLOOD PRESSURE: 115 MMHG

## 2018-01-02 PROBLEM — K81.9 CHOLECYSTITIS: Status: RESOLVED | Noted: 2017-12-29 | Resolved: 2018-01-02

## 2018-01-02 LAB
ALBUMIN SERPL BCP-MCNC: 2.3 G/DL
ALP SERPL-CCNC: 96 U/L
ALT SERPL W/O P-5'-P-CCNC: 18 U/L
ANION GAP SERPL CALC-SCNC: 6 MMOL/L
AST SERPL-CCNC: 7 U/L
BASOPHILS # BLD AUTO: 0.02 K/UL
BASOPHILS NFR BLD: 0.2 %
BILIRUB SERPL-MCNC: 0.4 MG/DL
BUN SERPL-MCNC: 19 MG/DL
CALCIUM SERPL-MCNC: 8.8 MG/DL
CHLORIDE SERPL-SCNC: 106 MMOL/L
CO2 SERPL-SCNC: 27 MMOL/L
CREAT SERPL-MCNC: 0.9 MG/DL
DIFFERENTIAL METHOD: ABNORMAL
EOSINOPHIL # BLD AUTO: 0.3 K/UL
EOSINOPHIL NFR BLD: 3.3 %
ERYTHROCYTE [DISTWIDTH] IN BLOOD BY AUTOMATED COUNT: 15.4 %
EST. GFR  (AFRICAN AMERICAN): >60 ML/MIN/1.73 M^2
EST. GFR  (NON AFRICAN AMERICAN): >60 ML/MIN/1.73 M^2
GLUCOSE SERPL-MCNC: 138 MG/DL
HCT VFR BLD AUTO: 27.6 %
HGB BLD-MCNC: 8.6 G/DL
LYMPHOCYTES # BLD AUTO: 0.8 K/UL
LYMPHOCYTES NFR BLD: 10 %
MAGNESIUM SERPL-MCNC: 1.5 MG/DL
MCH RBC QN AUTO: 27.3 PG
MCHC RBC AUTO-ENTMCNC: 31.2 G/DL
MCV RBC AUTO: 88 FL
MONOCYTES # BLD AUTO: 0.9 K/UL
MONOCYTES NFR BLD: 11.2 %
NEUTROPHILS # BLD AUTO: 6.3 K/UL
NEUTROPHILS NFR BLD: 74.8 %
PHOSPHATE SERPL-MCNC: 2.2 MG/DL
PLATELET # BLD AUTO: 188 K/UL
PMV BLD AUTO: 10.7 FL
POTASSIUM SERPL-SCNC: 3.6 MMOL/L
PROT SERPL-MCNC: 6 G/DL
RBC # BLD AUTO: 3.15 M/UL
SODIUM SERPL-SCNC: 139 MMOL/L
WBC # BLD AUTO: 8.39 K/UL

## 2018-01-02 PROCEDURE — 94799 UNLISTED PULMONARY SVC/PX: CPT

## 2018-01-02 PROCEDURE — 63600175 PHARM REV CODE 636 W HCPCS: Performed by: STUDENT IN AN ORGANIZED HEALTH CARE EDUCATION/TRAINING PROGRAM

## 2018-01-02 PROCEDURE — 97535 SELF CARE MNGMENT TRAINING: CPT

## 2018-01-02 PROCEDURE — 84100 ASSAY OF PHOSPHORUS: CPT

## 2018-01-02 PROCEDURE — 83735 ASSAY OF MAGNESIUM: CPT

## 2018-01-02 PROCEDURE — 25000003 PHARM REV CODE 250: Performed by: SURGERY

## 2018-01-02 PROCEDURE — G8979 MOBILITY GOAL STATUS: HCPCS | Mod: CJ

## 2018-01-02 PROCEDURE — 94761 N-INVAS EAR/PLS OXIMETRY MLT: CPT

## 2018-01-02 PROCEDURE — 36415 COLL VENOUS BLD VENIPUNCTURE: CPT

## 2018-01-02 PROCEDURE — 25000003 PHARM REV CODE 250: Performed by: STUDENT IN AN ORGANIZED HEALTH CARE EDUCATION/TRAINING PROGRAM

## 2018-01-02 PROCEDURE — 80053 COMPREHEN METABOLIC PANEL: CPT

## 2018-01-02 PROCEDURE — 97116 GAIT TRAINING THERAPY: CPT

## 2018-01-02 PROCEDURE — G8978 MOBILITY CURRENT STATUS: HCPCS | Mod: CK

## 2018-01-02 PROCEDURE — 97161 PT EVAL LOW COMPLEX 20 MIN: CPT

## 2018-01-02 PROCEDURE — 85025 COMPLETE CBC W/AUTO DIFF WBC: CPT

## 2018-01-02 PROCEDURE — 97165 OT EVAL LOW COMPLEX 30 MIN: CPT

## 2018-01-02 PROCEDURE — G8980 MOBILITY D/C STATUS: HCPCS | Mod: CK

## 2018-01-02 PROCEDURE — 63600175 PHARM REV CODE 636 W HCPCS: Performed by: FAMILY MEDICINE

## 2018-01-02 PROCEDURE — 63600175 PHARM REV CODE 636 W HCPCS: Performed by: SURGERY

## 2018-01-02 RX ORDER — NITROFURANTOIN MACROCRYSTALS 50 MG/1
50 CAPSULE ORAL EVERY 6 HOURS
Qty: 28 CAPSULE | Refills: 0 | Status: ON HOLD | OUTPATIENT
Start: 2018-01-02 | End: 2018-01-08 | Stop reason: HOSPADM

## 2018-01-02 RX ORDER — OXYCODONE AND ACETAMINOPHEN 5; 325 MG/1; MG/1
1 TABLET ORAL EVERY 6 HOURS PRN
Qty: 30 TABLET | Refills: 0 | Status: ON HOLD | OUTPATIENT
Start: 2018-01-02 | End: 2018-01-08 | Stop reason: HOSPADM

## 2018-01-02 RX ORDER — FLUCONAZOLE 150 MG/1
150 TABLET ORAL ONCE
Status: COMPLETED | OUTPATIENT
Start: 2018-01-02 | End: 2018-01-02

## 2018-01-02 RX ADMIN — LOSARTAN POTASSIUM 50 MG: 50 TABLET, FILM COATED ORAL at 08:01

## 2018-01-02 RX ADMIN — HYDROMORPHONE HYDROCHLORIDE 1 MG: 2 INJECTION INTRAMUSCULAR; INTRAVENOUS; SUBCUTANEOUS at 09:01

## 2018-01-02 RX ADMIN — HYDROMORPHONE HYDROCHLORIDE 1 MG: 2 INJECTION INTRAMUSCULAR; INTRAVENOUS; SUBCUTANEOUS at 05:01

## 2018-01-02 RX ADMIN — METOPROLOL TARTRATE 50 MG: 50 TABLET, FILM COATED ORAL at 09:01

## 2018-01-02 RX ADMIN — NIFEDIPINE 30 MG: 30 TABLET, FILM COATED, EXTENDED RELEASE ORAL at 08:01

## 2018-01-02 RX ADMIN — DEXTROSE MONOHYDRATE 30 MMOL: 5 INJECTION, SOLUTION INTRAVENOUS at 09:01

## 2018-01-02 RX ADMIN — OCTREOTIDE ACETATE 250 MCG/HR: 1000 INJECTION, SOLUTION INTRAVENOUS; SUBCUTANEOUS at 04:01

## 2018-01-02 RX ADMIN — ENOXAPARIN SODIUM 40 MG: 100 INJECTION SUBCUTANEOUS at 08:01

## 2018-01-02 RX ADMIN — HYDROMORPHONE HYDROCHLORIDE 1 MG: 2 INJECTION INTRAMUSCULAR; INTRAVENOUS; SUBCUTANEOUS at 01:01

## 2018-01-02 RX ADMIN — OXYCODONE AND ACETAMINOPHEN 2 TABLET: 5; 325 TABLET ORAL at 08:01

## 2018-01-02 RX ADMIN — FLUCONAZOLE 150 MG: 150 TABLET ORAL at 12:01

## 2018-01-02 NOTE — DISCHARGE SUMMARY
[facility]  General Surgery  Discharge Summary      Patient Name: Patito Domingo  MRN: [id number]  Admission Date: 12/29/2017  Hospital Length of Stay: 4 days  Discharge Date and Time:  01/02/2018 9:56 AM  Attending Physician: Chris Herbert MD   Discharging Provider: Keena Mistry MD  Primary Care Provider: Magdalena Hernandez MD     HPI: 68 yo female transferred from OSH for with RUQ abdominal pain, nausea and vomiting that started 1 day prior to presentation.  Patient has a past medical history significant for metastatic neuroendocrine tumor.  Seen by Dr. Sánchez in the past but never followed up for treatment.  CT done in ED showed intra-and extrahepatic biliary ductal dilatation with marked distention of the gallbladder as well as numerous additional hepatic masses consistent with cholecystitis with worsening metastatic liver disease.  Patient transferred to us for further evaluation of cholecystitis in the setting of metastatic neuroendocrine tumor.      Procedure(s) (LRB):  CHOLECYSTECTOMY (N/A)     Hospital Course: Patient was admitted to the hospital on the night of 12/29/17 with the above history.  The following day she underwent open cholecystectomy.  The decision was made to do the procedure open in the setting of her metastatic neuroendocrine tumor and significant disease burden in the area of the RUQ. Cholecystectomy was performed without issue.  Patient was found to have a very large, distended, inflamed gallbladder. A significant number of small liver lesions were seen intra operatively. No biopsies were taken. Patient tolerated the procedure without any immediate post operative complication and was transferred to the patient floor. Patient progressed well post operatively.  She was initially having significant RUQ incisional pain on POD #1 requiring IV pain medication, but pain control had improved significantly by POD #3.  Her diet was slowly advanced.  By POD #3  she was tolerating a regular diet.  She was voiding and having bowel movements without issue. She was ambulating independently.  She had met all criteria for discharge and was discharged home.  Patient had a urine culture positive for E. Coli, which she was treated for while inpatient. She will also be discharged on 7 days of nitrofurantoin to complete treatment.  She will follow up with Dr. Perez for post op visit in 2 weeks.  At that time she will also be put back into the system for neuroendocrine tumor therapy and treatment options will be discussed.     Consults:     Significant Diagnostic Studies: Imaging showed metastatic neuroendocrine tumor of the liver, worse since previous scans. Large distended gallbladder with associated wall thickening, pericholecystic fluid.    Pending Diagnostic Studies:     None        Final Active Diagnoses:    Diagnosis Date Noted POA      Problems Resolved During this Admission:    Diagnosis Date Noted Date Resolved POA    PRINCIPAL PROBLEM:  Cholecystitis [K81.9] 12/29/2017 01/02/2018 Yes      Discharged Condition: stable    Disposition: Home or Self Care    Follow Up:  Follow-up Information     Chris Herbert MD In 2 weeks.    Specialty:  General Surgery  Contact information:  [address]  [address]  [facility] [id number] [phone]                 Patient Instructions:     Diet Adult Regular     Lifting restrictions   Scheduling Instructions: No heavy lifting >10 lbs for 4-6 weeks     Notify your health care provider if you experience any of the following:  temperature >100.4     Notify your health care provider if you experience any of the following:  persistent nausea and vomiting or diarrhea     Notify your health care provider if you experience any of the following:  severe uncontrolled pain     Notify your health care provider if you experience any of the following:  redness, tenderness, or signs of infection (pain, swelling, redness, odor or  green/yellow discharge around incision site)     No dressing needed       Medications:  Reconciled Home Medications:   Current Discharge Medication List      START taking these medications    Details   nitrofurantoin (MACRODANTIN) 50 MG capsule Take 1 capsule (50 mg total) by mouth every 6 (six) hours.  Qty: 28 capsule, Refills: 0      oxyCODONE-acetaminophen (PERCOCET) 5-325 mg per tablet Take 1 tablet by mouth every 6 (six) hours as needed for Pain.  Qty: 30 tablet, Refills: 0         CONTINUE these medications which have NOT CHANGED    Details   cyanocobalamin 1,000 mcg/mL injection 1,000 mcg every 30 days.   Refills: 0      docusate sodium (COLACE) 100 MG capsule Take 100 mg by mouth 2 (two) times daily as needed.       ergocalciferol (VITAMIN D2) 50,000 unit Cap Take 1 capsule (50,000 Units total) by mouth every 7 days.  Qty: 12 capsule, Refills: 4      ferrous gluconate (FERGON) 324 MG tablet TAKE 1 TABLET EVERY DAY WITH BREAKFAST  Qty: 30 tablet, Refills: 2    Associated Diagnoses: Iron deficiency anemia, unspecified iron deficiency anemia type      gabapentin (NEURONTIN) 100 MG capsule Take 100 mg by mouth 2 (two) times daily.      LOPERAMIDE HCL (IMODIUM A-D ORAL) Take by mouth daily as needed.       losartan (COZAAR) 50 MG tablet once daily.       magnesium oxide (MAG-OX) 250 mg Tab Take by mouth once daily.       metoprolol tartrate (LOPRESSOR) 50 MG tablet Take 50 mg by mouth 2 (two) times daily.      nystatin (MYCOSTATIN) 100,000 unit/mL suspension Take 6 mLs (600,000 Units total) by mouth 4 (four) times daily as needed.  Qty: 60 mL, Refills: 0      ondansetron (ZOFRAN) 8 MG tablet Take by mouth every 8 (eight) hours as needed for Nausea.      oxycodone (ROXICODONE) 30 MG Tab       polyethylene glycol (GLYCOLAX) 17 gram/dose powder       tramadol (ULTRAM) 50 mg tablet Take 50 mg by mouth every 6 (six) hours as needed for Pain.      trazodone (DESYREL) 50 MG tablet Take 50 mg by mouth nightly as needed.        triamcinolone acetonide 0.1% (KENALOG) 0.1 % cream Apply topically 2 (two) times daily.      XIIDRA 5 % Lanceet              Keena Mistry MD  General Surgery  [facility]

## 2018-01-02 NOTE — PLAN OF CARE
s/p open cholecystectomy   12/30   lives with adult son and DIL   has rw, bsc tub bench     TN met with pt and adult son Kumar          01/02/18 8140   Discharge Assessment   Assessment Type Discharge Planning Assessment   Confirmed/corrected address and phone number on facesheet? Yes   Assessment information obtained from? Patient;Medical Record   Expected Length of Stay (days) 4   Communicated expected length of stay with patient/caregiver yes   Prior to hospitilization cognitive status: Alert/Oriented   Prior to hospitalization functional status: Independent   Current cognitive status: Alert/Oriented   Current Functional Status: Independent   Lives With child(katherine), adult   Able to Return to Prior Arrangements yes   Is patient able to care for self after discharge? Yes   Who are your caregiver(s) and their phone number(s)? (sierra Salcedo   [phone] )   Patient's perception of discharge disposition home or selfcare   Readmission Within The Last 30 Days no previous admission in last 30 days   Patient currently being followed by outpatient case management? No   Patient currently receives any other outside agency services? No   Equipment Currently Used at Home bath bench;walker, rolling;bedside commode   Do you have any problems affording any of your prescribed medications? No   Is the patient taking medications as prescribed? yes   Does the patient have transportation home? Yes   Transportation Available family or friend will provide   Does the patient receive services at the Coumadin Clinic? No   Discharge Plan A Home;Home with family   Patient/Family In Agreement With Plan yes

## 2018-01-02 NOTE — PROGRESS NOTES
TN shared with MD Physical Therpist's assessment and suggestion for hh/pt/ot --   After MD consideration - determined that Hh will not be ordered for pt at this time --  .

## 2018-01-02 NOTE — PLAN OF CARE
Problem: Patient Care Overview  Goal: Plan of Care Review  Outcome: Ongoing (interventions implemented as appropriate)  Patient encouraged to Turn, Cough, deep breath and splint incision, and use IS to prevent infection and help healing process. Medicated for pain with IV and PO medications with adequate pain relief. IV antibiotics given as ordered.

## 2018-01-02 NOTE — PLAN OF CARE
Problem: Occupational Therapy Goal  Goal: Occupational Therapy Goal  Outcome: Unable to achieve outcome(s) by discharge  Pt participating in initial eval this date. Pt with decreased independence during performance of ADLs and functional mobility. Pt to d/c home this date. Recommended HHOT/PT, however, it was not ordered at this time. Will d/c OT orders as pt to leave hospital this date.

## 2018-01-02 NOTE — PROGRESS NOTES
NEUROENDOCRINE TUMOR SERVICE  Progress Note    No acute events.  Still complaining of pain.   Tolerating regular diet. Getting OOB and using IS. BM x3.  BP improved with addition of Procardia.  Afebrile.    Temp:  [98.3 °F (36.8 °C)-99.2 °F (37.3 °C)] 98.3 °F (36.8 °C)  Pulse:  [66-87] 82  Resp:  [16-19] 18  SpO2:  [94 %-98 %] 98 %  BP: (122-147)/(60-67) 140/64    I/O last 3 completed shifts:  In: 1795.5 [P.O.:280; I.V.:1315.5; IV Piggyback:200]  Out: 1625 [Urine:1625]    EXAM  Awake & alert, NAD  Non-labored breathing on room air  Abd soft, ND, appropriately TTP in RUQ  Incision with dermabond intact, edges well-approximated    LABS  WBC 8  H/H 8.6/27    K 3.6  Phos 2.2  Mg 1.5  Tbili 0.4    ASSESSMENT/PLAN  67 year old female with metastatic neuroendocrine tumor, cholecystitis s/p open cholecystectomy POD #3  -Invanz started for multi drug resistant E. Coli in urine, will continue to monitor for fevers, increasing WBC count  -BP improved, continue current regimen  -Pain control  -Regular diet  -Replace electrolytes  -Encourage IS, ambulation  -Lovenox for DVT ppx    Possible discharge home later today.      Keena Mistry MD  [employer] General Surgery PGY-2

## 2018-01-02 NOTE — PLAN OF CARE
TN met with pt and adult son prior to d/c   reviewed post d/c apts     pt got meds per bedside deliverry  -- d/c instructions detailed by pt's nurse     Future Appointments  Date Time Provider Department Center   1/15/2018 10:00 AM Chris Herbert MD [facility]   1/23/2018 9:00 AM Magdalena Hernandez MD [facility]        01/02/18 7295   Final Note   Assessment Type Final Discharge Note   Discharge Disposition Home   What phone number can be called within the next 1-3 days to see how you are doing after discharge? [phone]   Hospital Follow Up  Appt(s) scheduled? Yes   Discharge plans and expectations educations in teach back method with documentation complete? Yes   Right Care Referral Info   Post Acute Recommendation (no recc )   Referral Type (no care )

## 2018-01-02 NOTE — PROGRESS NOTES
Follow-Up       Follow-up With  Details  Why  Contact Info   Chris Herbert MD  On 1/15/2018  10:00 am   [address]  [address]  [place] [id number]  [phone]   Magdalena Hernandez MD  On 1/23/2018  9:00 am previously booked   [address]  [address]  [place] [id number]  [phone]

## 2018-01-02 NOTE — PT/OT/SLP EVAL
Occupational Therapy   Evaluation and Discharge Note    Name: Patito Domingo  MRN: [id number]  Admitting Diagnosis:  Cholecystitis 3 Days Post-Op    Recommendations:     Discharge Recommendations: home health PT, home health OT  Discharge Equipment Recommendations:  none  Barriers to discharge:  None (reports her DIL will be home with her to assist )    History:     Occupational Profile:  Living Environment: Pt lives with son and DIL in [facility], threshold to enter, tub/shower combo  Previous level of function: mod I with equipment   Equipment Owned:  bath bench, walker, rolling  Assistance upon Discharge: family     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)        Past Surgical History:   Procedure Laterality Date    CATARACT EXTRACTION Left 10/2017    COLON SURGERY      cystoscope      EYE SURGERY      HYSTERECTOMY  5/1996    LITHOTRIPSY      LIVER BIOPSY  9/14    carcinoid       Subjective     Chief Complaint: abdominal pain  Patient/Family stated goals: return home   Communicated with: nsg prior to session.  Pain/Comfort:  · Pain Rating 1:  (did not rate; reports, moans with movement)  · Location - Orientation 1: generalized  · Location 1: abdomen  · Pain Addressed 1: Distraction, Cessation of Activity, Nurse notified, Reposition, Pre-medicate for activity    Objective:     Patient found with:      General Precautions: Standard, fall   Orthopedic Precautions:N/A   Braces:       Occupational Performance:    Bed Mobility:    · Patient completed Supine to Sit with moderate assistance  · Patient completed Sit to Supine with moderate assistance    Functional Mobility/Transfers:  · Patient completed Sit <> Stand Transfer with contact guard assistance  with  rolling walker   · Patient completed Bed <> Chair Transfer using  Step Transfer technique with contact guard assistance with rolling walker  · Patient completed Toilet Transfer Step Transfer technique with contact guard assistance with  rolling walker    Activities of Daily Living:  · Grooming: contact guard assistance standing at sink   · LB Dressing: minimum assistance lisa undergarments while seated   · Toileting: minimum assistance on toilet     Cognitive/Visual Perceptual:  Cognitive/Psychosocial Skills:     -       Oriented to: Person, Place, Time and Situation   -       Follows Commands/attention:Follows multistep  commands  -       Communication: clear/fluent  -       Memory: No Deficits noted  -       Safety awareness/insight to disability: impaired   -       Mood/Affect/Coping skills/emotional control: Flat affect    Physical Exam:  Balance:    -       CGA 2/2 poor posture; forward flexion and flexed LEs at all times  Postural examination/scapula alignment:    Dominant hand:    -       right  Upper Extremity Range of Motion:  WFL based on function  Upper Extremity Strength: grossly 4 to 4-/5 based on function    Strength: good   Fine Motor Coordination:    -       Intact    Patient left supine with all lines intact, call button in reach, bed alarm on and nsg notified    [facility] 6 Click:  [facility] Total Score: 19    Treatment & Education:  Pt participating in ADLs as listed above; functional mobility performed in room ~ 25 feet total during session during performing ADLs; Pt with significantly forward flexed/hunched posture and flexed/bent knees- attempted to assist pt to upright posture, however pt reported increased pain and refusing to attempt upright stance; educated pt on impt of proper/upright posture for healing and trunk extension/ROM   Education:    Assessment:     Patito Domingo is a 67 y.o. female with a medical diagnosis of Cholecystitis. At this time, patient is functioning at their prior level of function and does not require further acute OT services.  "    Clinical Decision Makin.  OT Low:  "Pt evaluation falls under low complexity for evaluation coding due to performance deficits noted in 1-3 areas as stated above and 0 co-morbities affecting current functional status. Data obtained from problem focused assessments. No modifications or assistance was required for completion of evaluation. Only brief occupational profile and history review completed."     Plan:     During this hospitalization, patient does not require further acute OT services.  Please re-consult if situation changes.    · Plan of Care Reviewed with: patient    This Plan of care has been discussed with the patient who was involved in its development and understands and is in agreement with the identified goals and treatment plan    GOALS:    Occupational Therapy Goals        Problem: Occupational Therapy Goal    Goal Priority Disciplines Outcome Interventions   Occupational Therapy Goal     OT, PT/OT Unable to achieve outcome(s) by discharge                    Time Tracking:     OT Date of Treatment: 18  OT Start Time: 1034  OT Stop Time: 1058  OT Total Time (min): 24 min    Billable Minutes:Evaluation 10  Self Care/Home Management 14    Brooke Phan OT  2018    "

## 2018-01-02 NOTE — PLAN OF CARE
Problem: Physical Therapy Goal  Goal: Physical Therapy Goal  Goals to be met by: 18     Patient will increase functional independence with mobility by performin. Supine <> sit with Stand-by Assistance  2. Sit to stand transfer with Supervision  3. Bed to chair transfer with Supervision using Rolling Walker  4. Gait  x 100 feet with Supervision using Rolling Walker.     Outcome: Ongoing (interventions implemented as appropriate)  PT evaluation completed and pt will benefit from IP PT to address pt's functional limitations. Recommending  PT/OT upon d/c.

## 2018-01-02 NOTE — PT/OT/SLP EVAL
"Physical Therapy Evaluation and Treatment    Patient Name:  Patito Domingo   MRN:  [id number]    Recommendations:     Discharge Recommendations:  home health PT, home health OT   Discharge Equipment Recommendations: none   Barriers to discharge: None    Assessment:     Patito Domingo is a 67 y.o. female admitted with a medical diagnosis of Cholecystitis.  She presents with the following impairments/functional limitations:  weakness, impaired endurance, impaired self care skills, impaired functional mobilty, impaired balance, gait instability, pain, impaired skin.    Rehab Prognosis: Good; patient would benefit from acute skilled PT services to address these deficits and reach maximum level of function.      Recent Surgery: Procedure(s) (LRB):  CHOLECYSTECTOMY (N/A) 3 Days Post-Op    Plan:     During this hospitalization, patient to be seen 6 x/week to address the above listed problems via gait training, therapeutic activities, therapeutic exercises  · Plan of Care Expires:  02/02/18   Plan of Care Reviewed with: patient    Subjective     Communicated with PANCHITO Duque prior to session. Patient found supine with HOB elevated upon PT entry to room, agreeable to evaluation.      Chief Complaint: abdominal pain  Patient comments/goals: "I just got up and to the bathroom earlier"  Pain/Comfort:  · Pain Rating 1: 7/10  · Location 1: abdomen  · Pain Addressed 1: Reposition, Distraction, Cessation of Activity, Nurse notified  · Pain Rating Post-Intervention 1: 7/10    Patients cultural, spiritual, Shinto conflicts given the current situation: none reported to PT    Living Environment:  Pt lives with her son, PERCY, and 3 grandchildren in a [facility] with THE and tub/shower combo with TTB.  Prior to admission, patients level of function was I/mod I with and without RW (pending her fatigue level).  Patient has the following equipment: bath bench, walker, rolling.  DME owned (not currently used): bedside commode.  Upon discharge, " patient will have assistance from her family.    Objective:     Patient found with: bed alarm     General Precautions: Standard, fall   Orthopedic Precautions:N/A   Braces: N/A     Exams:  · Postural Exam:  Patient presented with the following abnormalities:    · -       Rounded shoulders  · -       Forward head  · -       Flexed trunk in standing and does not correct  · RLE ROM: WFL  · RLE Strength: WFL except hip flexion 4-/5 and hip add 3+/5  · LLE ROM: WFL  · LLE Strength: Deficits: knee grossly 3+/5, hip grossly 3+/5    Functional Mobility:  · Bed Mobility:     · Rolling Left:  contact guard assistance  · Supine to Sit: contact guard assistance  · Sit to Supine: minimum assistance  · Transfers:     · Sit to Stand:  stand by assistance with rolling walker  · Gait: 90 ft with RW and CGA with max cues for improved upright posture 2/2 pt standing with flexed posture over RW and does not correct    AM-PAC 6 CLICK MOBILITY  Total Score:17       Therapeutic Activities and Exercises:   Pt instructed in BLE seated exercises x 7 reps: APs, LAQs, and iso hip adduction. Pt educated on importance of improved posture, trunk extension to return to neutral, and improved positioning.    Patient left HOB elevated with all lines intact, call button in reach, bed alarm on and RN notified.    GOALS:    Physical Therapy Goals        Problem: Physical Therapy Goal    Goal Priority Disciplines Outcome Goal Variances Interventions   Physical Therapy Goal     PT/OT, PT Ongoing (interventions implemented as appropriate)     Description:  Goals to be met by: 18     Patient will increase functional independence with mobility by performin. Supine <> sit with Stand-by Assistance  2. Sit to stand transfer with Supervision  3. Bed to chair transfer with Supervision using Rolling Walker  4. Gait  x 100 feet with Supervision using Rolling Walker.                       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)        Past Surgical History:   Procedure Laterality Date    CATARACT EXTRACTION Left 10/2017    COLON SURGERY      cystoscope      EYE SURGERY      HYSTERECTOMY  5/1996    LITHOTRIPSY      LIVER BIOPSY  9/14    carcinoid       Clinical Decision Making:     History  Co-morbidities and personal factors that may impact the plan of care Examination  Body Structures and Functions, activity limitations and participation restrictions that may impact the plan of care Clinical Presentation   Decision Making/ Complexity Score   Co-morbidities:   [] Time since onset of injury / illness / exacerbation  [] Status of current condition  []Patient's cognitive status and safety concerns    [] Multiple Medical Problems (see med hx)  Personal Factors:   [] Patient's age  [] Prior Level of function   [] Patient's home situation (environment and family support)  [] Patient's level of motivation  [] Expected progression of patient      HISTORY:(criteria)    [x] 62694 - no personal factors/history    [] 81763 - has 1-2 personal factor/comorbidity     [] 30677 - has >3 personal factor/comorbidity     Body Regions:  [] Objective examination findings  [] Head     []  Neck  [] Trunk   [] Upper Extremity  [] Lower Extremity    Body Systems:  [] For communication ability, affect, cognition, language, and learning style: the assessment of the ability to make needs known, consciousness, orientation (person, place, and time), expected emotional /behavioral responses, and learning preferences (eg, learning barriers, education  needs)  [x] For the neuromuscular system: a general assessment of gross coordinated movement (eg, balance, gait, locomotion, transfers, and transitions) and motor function  (motor control and motor learning)  [] For the  musculoskeletal system: the assessment of gross symmetry, gross range of motion, gross strength, height, and weight  [] For the integumentary system: the assessment of pliability(texture), presence of scar formation, skin color, and skin integrity  [] For cardiovascular/pulmonary system: the assessment of heart rate, respiratory rate, blood pressure, and edema     Activity limitations:    [] Patient's cognitive status and saf ety concerns          [] Status of current condition      [] Weight bearing restriction  [] Cardiopulmunary Restriction    Participation Restrictions:   [] Goals and goal agreement with the patient     [] Rehab potential (prognosis) and probable outcome      Examination of Body System: (criteria)    [x] 06280 - addressing 1-2 elements    [] 79347 - addressing a total of 3 or more elements     [] 34077 -  Addressing a total of 4 or more elements         Clinical Presentation: (criteria)  Stable - 01918     On examination of body system using standardized tests and measures patient presents with 1-2 elements from any of the following: body structures and functions, activity limitations, and/or participation restrictions.  Leading to a clinical presentation that is considered stable and/or uncomplicated                              Clinical Decision Making  (Eval Complexity):  Low- 78353     Time Tracking:     PT Received On: 01/02/18  PT Start Time: 1009     PT Stop Time: 1034  PT Total Time (min): 25 min     Billable Minutes: Evaluation 15 and Gait Training 10      Flower Alicea, PT  01/02/2018

## 2018-01-03 DIAGNOSIS — G47.00 INSOMNIA, UNSPECIFIED TYPE: Primary | ICD-10-CM

## 2018-01-03 RX ORDER — ZOLPIDEM TARTRATE 5 MG/1
5 TABLET ORAL NIGHTLY PRN
Qty: 10 TABLET | Refills: 0 | Status: SHIPPED | OUTPATIENT
Start: 2018-01-03 | End: 2018-03-09

## 2018-01-03 NOTE — TELEPHONE ENCOUNTER
Took call directly from patient.  She stated that she is taking the pain meds as prescribed and it does help with the pain but she has been going to the bathroom so much with diarrhea from taking the colace that she can not rest.  Told her to stop taking the colace and that I would check with Dr Perry.  He said we could escribe ambien 5 mg only 10 pills and he would approve it.  Phoned patient back to get the correct pharmacy and escribed it.  Message sent to Dr Perry for authorization.

## 2018-01-03 NOTE — PT/OT/SLP DISCHARGE
Physical Therapy Discharge Summary    Name: Patito Domingo  MRN: [id number]   Principal Problem: Cholecystitis     Patient Discharged from acute Physical Therapy on 18.  Please refer to prior PT noted date on  for functional status.     Assessment:     Patient appropriate for care in another setting.    Objective:     GOALS:    Physical Therapy Goals        Problem: Physical Therapy Goal    Goal Priority Disciplines Outcome Goal Variances Interventions   Physical Therapy Goal     PT/OT, PT Ongoing (interventions implemented as appropriate)     Description:  Goals to be met by: 18     Patient will increase functional independence with mobility by performin. Supine <> sit with Stand-by Assistance  2. Sit to stand transfer with Supervision  3. Bed to chair transfer with Supervision using Rolling Walker  4. Gait  x 100 feet with Supervision using Rolling Walker.                       Reasons for Discontinuation of Therapy Services  Transfer to alternate level of care.      Plan:     Patient Discharged to: Home-recommended  PT/OT.    Flower Alicea, PT  1/3/2018

## 2018-01-03 NOTE — TELEPHONE ENCOUNTER
----- Message from Louis O. Jeansonne IV, MD sent at 12/29/2017 10:21 AM CST -----  Please ask Dr. Sánchez to call me regarding this patient, [phone].    Louis O. Jeansonne IV, MD, FACS

## 2018-01-04 ENCOUNTER — TELEPHONE (OUTPATIENT)
Dept: FAMILY MEDICINE | Facility: CLINIC | Age: 68
End: 2018-01-04

## 2018-01-04 ENCOUNTER — HOSPITAL ENCOUNTER (INPATIENT)
Facility: HOSPITAL | Age: 68
LOS: 3 days | Discharge: HOME | DRG: 392 | End: 2018-01-08
Attending: SURGERY | Admitting: SURGERY
Payer: MEDICARE

## 2018-01-04 ENCOUNTER — PATIENT OUTREACH (OUTPATIENT)
Dept: ADMINISTRATIVE | Facility: CLINIC | Age: 68
End: 2018-01-04

## 2018-01-04 ENCOUNTER — TELEPHONE (OUTPATIENT)
Dept: NEUROLOGY | Facility: HOSPITAL | Age: 68
End: 2018-01-04

## 2018-01-04 DIAGNOSIS — R52 INTRACTABLE PAIN: ICD-10-CM

## 2018-01-04 DIAGNOSIS — K52.9 COLITIS: Primary | ICD-10-CM

## 2018-01-04 LAB
ALBUMIN SERPL BCP-MCNC: 2.9 G/DL
ALP SERPL-CCNC: 96 U/L
ALT SERPL W/O P-5'-P-CCNC: 11 U/L
ANION GAP SERPL CALC-SCNC: 14 MMOL/L
AST SERPL-CCNC: 9 U/L
BACTERIA BLD CULT: NORMAL
BACTERIA BLD CULT: NORMAL
BASOPHILS # BLD AUTO: 0.05 K/UL
BASOPHILS NFR BLD: 0.7 %
BILIRUB SERPL-MCNC: 0.5 MG/DL
BUN SERPL-MCNC: 11 MG/DL
CALCIUM SERPL-MCNC: 9.4 MG/DL
CHLORIDE SERPL-SCNC: 100 MMOL/L
CO2 SERPL-SCNC: 28 MMOL/L
CREAT SERPL-MCNC: 0.8 MG/DL
DIFFERENTIAL METHOD: ABNORMAL
EOSINOPHIL # BLD AUTO: 0.3 K/UL
EOSINOPHIL NFR BLD: 4 %
ERYTHROCYTE [DISTWIDTH] IN BLOOD BY AUTOMATED COUNT: 14.3 %
EST. GFR  (AFRICAN AMERICAN): >60 ML/MIN/1.73 M^2
EST. GFR  (NON AFRICAN AMERICAN): >60 ML/MIN/1.73 M^2
GLUCOSE SERPL-MCNC: 94 MG/DL
HCT VFR BLD AUTO: 31 %
HGB BLD-MCNC: 9.6 G/DL
LIPASE SERPL-CCNC: 37 U/L
LYMPHOCYTES # BLD AUTO: 1.3 K/UL
LYMPHOCYTES NFR BLD: 17.9 %
MCH RBC QN AUTO: 27.4 PG
MCHC RBC AUTO-ENTMCNC: 31 G/DL
MCV RBC AUTO: 88 FL
MONOCYTES # BLD AUTO: 0.7 K/UL
MONOCYTES NFR BLD: 9.7 %
NEUTROPHILS # BLD AUTO: 4.9 K/UL
NEUTROPHILS NFR BLD: 67.3 %
PLATELET # BLD AUTO: 229 K/UL
PMV BLD AUTO: 9.9 FL
POTASSIUM SERPL-SCNC: 3.1 MMOL/L
PROT SERPL-MCNC: 6.9 G/DL
RBC # BLD AUTO: 3.51 M/UL
SODIUM SERPL-SCNC: 142 MMOL/L
WBC # BLD AUTO: 7.21 K/UL

## 2018-01-04 PROCEDURE — 96361 HYDRATE IV INFUSION ADD-ON: CPT

## 2018-01-04 PROCEDURE — 87449 NOS EACH ORGANISM AG IA: CPT

## 2018-01-04 PROCEDURE — 99285 EMERGENCY DEPT VISIT HI MDM: CPT | Mod: 25

## 2018-01-04 PROCEDURE — 85025 COMPLETE CBC W/AUTO DIFF WBC: CPT

## 2018-01-04 PROCEDURE — 25000003 PHARM REV CODE 250: Performed by: EMERGENCY MEDICINE

## 2018-01-04 PROCEDURE — 96375 TX/PRO/DX INJ NEW DRUG ADDON: CPT

## 2018-01-04 PROCEDURE — 63600175 PHARM REV CODE 636 W HCPCS: Performed by: EMERGENCY MEDICINE

## 2018-01-04 PROCEDURE — 83690 ASSAY OF LIPASE: CPT

## 2018-01-04 PROCEDURE — 80053 COMPREHEN METABOLIC PANEL: CPT

## 2018-01-04 PROCEDURE — G0378 HOSPITAL OBSERVATION PER HR: HCPCS

## 2018-01-04 RX ORDER — HYDROMORPHONE HYDROCHLORIDE 2 MG/ML
1 INJECTION, SOLUTION INTRAMUSCULAR; INTRAVENOUS; SUBCUTANEOUS
Status: COMPLETED | OUTPATIENT
Start: 2018-01-04 | End: 2018-01-04

## 2018-01-04 RX ORDER — POTASSIUM CHLORIDE 20 MEQ/15ML
40 SOLUTION ORAL ONCE
Status: COMPLETED | OUTPATIENT
Start: 2018-01-04 | End: 2018-01-04

## 2018-01-04 RX ORDER — SODIUM CHLORIDE 9 MG/ML
1000 INJECTION, SOLUTION INTRAVENOUS
Status: COMPLETED | OUTPATIENT
Start: 2018-01-04 | End: 2018-01-05

## 2018-01-04 RX ORDER — METRONIDAZOLE 500 MG/1
500 TABLET ORAL EVERY 8 HOURS
Status: DISCONTINUED | OUTPATIENT
Start: 2018-01-05 | End: 2018-01-08 | Stop reason: HOSPADM

## 2018-01-04 RX ORDER — METRONIDAZOLE 500 MG/1
500 TABLET ORAL
Status: COMPLETED | OUTPATIENT
Start: 2018-01-04 | End: 2018-01-04

## 2018-01-04 RX ORDER — SODIUM CHLORIDE 9 MG/ML
INJECTION, SOLUTION INTRAVENOUS CONTINUOUS
Status: DISCONTINUED | OUTPATIENT
Start: 2018-01-05 | End: 2018-01-06

## 2018-01-04 RX ORDER — HYDROMORPHONE HYDROCHLORIDE 2 MG/ML
0.5 INJECTION, SOLUTION INTRAMUSCULAR; INTRAVENOUS; SUBCUTANEOUS EVERY 4 HOURS PRN
Status: DISCONTINUED | OUTPATIENT
Start: 2018-01-05 | End: 2018-01-05

## 2018-01-04 RX ORDER — MORPHINE SULFATE 10 MG/ML
2 INJECTION, SOLUTION INTRAMUSCULAR; INTRAVENOUS
Status: DISCONTINUED | OUTPATIENT
Start: 2018-01-04 | End: 2018-01-04

## 2018-01-04 RX ADMIN — SODIUM CHLORIDE 1000 ML: 0.9 INJECTION, SOLUTION INTRAVENOUS at 08:01

## 2018-01-04 RX ADMIN — HYDROMORPHONE HYDROCHLORIDE 0.5 MG: 2 INJECTION, SOLUTION INTRAMUSCULAR; INTRAVENOUS; SUBCUTANEOUS at 11:01

## 2018-01-04 RX ADMIN — METRONIDAZOLE 500 MG: 500 TABLET ORAL at 11:01

## 2018-01-04 RX ADMIN — HYDROMORPHONE HYDROCHLORIDE 1 MG: 2 INJECTION, SOLUTION INTRAMUSCULAR; INTRAVENOUS; SUBCUTANEOUS at 08:01

## 2018-01-04 RX ADMIN — POTASSIUM CHLORIDE 40 MEQ: 20 SOLUTION ORAL at 10:01

## 2018-01-04 NOTE — TELEPHONE ENCOUNTER
Spoke with Dr. Perry, ordered to go to ER for eval of stool, hydration and pain.  Pts daughter in law verbalized understanding.

## 2018-01-04 NOTE — TELEPHONE ENCOUNTER
kePt called to report she is taking oxycodone- acetaminophin (5-325 mg) and it is difficult to swallow due to size and her mouth is so dry.  She is requesting the smaller pill, oxycodone 30 mg - she was on this previous and it is easier to swallow, but she is out of it.

## 2018-01-04 NOTE — PROGRESS NOTES
C3 nurse attempted to contact patient. No answer. The following message was left for the patient to return the call:  Good morning  I am a nurse calling on behalf of [facility] from the [facility].  This is a Transitional Care Call for Patito Sommerjossy. When you have a moment please contact us at [phone] or [phone] Monday through Friday, between the hours of 8 am to 4 pm. We look forward to speaking with you. On behalf of [facility] have a nice day.    The patient has a scheduled \[facility] [place] [facility]\"" appointment with Chris Herbert MD on 1/15/2018 AT 10:00 am.

## 2018-01-04 NOTE — TELEPHONE ENCOUNTER
Having diarrhea approx 6 times/day.  She tried immodium and kayopectate without relief.  This started right after surgery. Pt eating bland food, toast, eggs.

## 2018-01-04 NOTE — TELEPHONE ENCOUNTER
----- Message from Katalina Gaytan sent at 1/4/2018  2:41 PM CST -----  Contact: Patient  BRIAN- Patient called and stated that she is experiencing diarrhea. Patient would like to speak to nurse or Dr regarding. Call back number [phone]

## 2018-01-04 NOTE — TELEPHONE ENCOUNTER
----- Message from Maria Eugenia Dvais sent at 1/4/2018 10:32 AM CST -----  Contact: [phone] Asuncion  Patient had her gallbladder remove and she is now having excessive diarrhea and would like to know what she can take for it. Please call and advise.

## 2018-01-04 NOTE — TELEPHONE ENCOUNTER
----- Message from Katalina Gaytan sent at 1/4/2018 11:13 AM CST -----  Contact: daughter n law Asuncion Roland called to state that her mother n law is having severe diarrhea and would like to speak to nurse regarding on any medication that she can take. Call back number [phone]

## 2018-01-04 NOTE — PATIENT INSTRUCTIONS
Cholecystectomy     Clips close off the duct connecting the gallbladder to the bile duct. The gallbladder is then removed.     Youve had painful attacks caused by gallstones. To treat the problem, your healthcare provider wants to remove your gallbladder. This surgery is called cholecystectomy. Removing the gallbladder can relieve pain. It will also prevent future attacks. You can live a healthy life without your gallbladder. You may also be able to go back to eating foods you enjoyed before your gallbladder problems started.  Before your surgery  Be prepared:  · Tell your provider what medicines you take. Include those bought over the counter. Also include herbs or supplements. Be sure to mention if you take prescription blood thinners. This includes warfarin, clopidogrel, and aspirin.  · Have any tests your provider asks for, such as blood tests.  · Dont eat or drink after midnight, the night before your surgery. This includes water, coffee, and mints. However, you may need to take some medicine with sips of water--talk with your healthcare provider.  The day of surgery  When you arrive, you will prepare for surgery:  · An IV line will be put into a vein in your arm or hand. This gives you fluids and medicine.  · An anesthesiologist will talk with you about anesthesia. This is medicine used to prevent pain. You will receive general anesthesia. This puts you into a state like deep sleep through the procedure.  During surgery  There are 2 methods for removing the gallbladder. Your healthcare provider will choose which method is best for you:  · Laparoscopic cholecystectomy. This is most common. During surgery, 2 to 4 small incisions are made. A thin tube with a camera is used. This is called a laparoscope. The scope is put through one of the incisions. It sends images to a video screen. Surgical tools are put through other incisions. The gallbladder is removed using the scope and these tools.  · Open  cholecystectomy. One larger incision is made. The surgeon sees and works through this incision. Open surgery is most often used when scarring or other factors make it a better choice for you.  In some cases, safety requires a change from laparoscopic to open surgery during the procedure.  After surgery  You will be sent to a room to wake up from the anesthesia. You will likely go home the same day. In some cases, an overnight stay is needed. If you had open cholecystectomy, you may need to stay in the hospital for a few days. When you are released to go home, have a family member or friend ready to drive you.  Risks and possible complications of gallbladder surgery  All surgeries have risks. The risks of gallbladder surgery include:  · Bleeding  · Infection  · Injury to the common bile duct or nearby organs  · Blood clots in the legs  · Bile leaks  · Hernia at incision site  · Pnemonia   Date Last Reviewed: 7/1/2016  © 8423-1358 [employer], [employer]. [address] [place] [address], [place] [address]. All rights reserved. This information is not intended as a substitute for professional medical care. Always follow your healthcare professional's instructions.

## 2018-01-05 PROBLEM — R52 INTRACTABLE PAIN: Status: ACTIVE | Noted: 2018-01-05

## 2018-01-05 LAB
ALBUMIN SERPL BCP-MCNC: 2.7 G/DL
ALP SERPL-CCNC: 91 U/L
ALT SERPL W/O P-5'-P-CCNC: 10 U/L
ANION GAP SERPL CALC-SCNC: 9 MMOL/L
AST SERPL-CCNC: 9 U/L
BACTERIA #/AREA URNS HPF: ABNORMAL /HPF
BASOPHILS # BLD AUTO: 0.03 K/UL
BASOPHILS NFR BLD: 0.4 %
BILIRUB SERPL-MCNC: 0.4 MG/DL
BILIRUB UR QL STRIP: NEGATIVE
BUN SERPL-MCNC: 10 MG/DL
C DIFF GDH STL QL: NEGATIVE
C DIFF TOX A+B STL QL IA: NEGATIVE
CALCIUM SERPL-MCNC: 8.8 MG/DL
CHLORIDE SERPL-SCNC: 104 MMOL/L
CLARITY UR: CLEAR
CO2 SERPL-SCNC: 28 MMOL/L
COLOR UR: YELLOW
CREAT SERPL-MCNC: 0.8 MG/DL
DIFFERENTIAL METHOD: ABNORMAL
EOSINOPHIL # BLD AUTO: 0.3 K/UL
EOSINOPHIL NFR BLD: 4.5 %
ERYTHROCYTE [DISTWIDTH] IN BLOOD BY AUTOMATED COUNT: 14.3 %
EST. GFR  (AFRICAN AMERICAN): >60 ML/MIN/1.73 M^2
EST. GFR  (NON AFRICAN AMERICAN): >60 ML/MIN/1.73 M^2
GLUCOSE SERPL-MCNC: 99 MG/DL
GLUCOSE UR QL STRIP: NEGATIVE
HCT VFR BLD AUTO: 28.1 %
HGB BLD-MCNC: 8.8 G/DL
HGB UR QL STRIP: ABNORMAL
KETONES UR QL STRIP: ABNORMAL
LEUKOCYTE ESTERASE UR QL STRIP: ABNORMAL
LYMPHOCYTES # BLD AUTO: 1.5 K/UL
LYMPHOCYTES NFR BLD: 21.7 %
MCH RBC QN AUTO: 27.1 PG
MCHC RBC AUTO-ENTMCNC: 31.3 G/DL
MCV RBC AUTO: 87 FL
MICROSCOPIC COMMENT: ABNORMAL
MONOCYTES # BLD AUTO: 0.5 K/UL
MONOCYTES NFR BLD: 7.3 %
NEUTROPHILS # BLD AUTO: 4.7 K/UL
NEUTROPHILS NFR BLD: 65.5 %
NITRITE UR QL STRIP: NEGATIVE
PH UR STRIP: 6 [PH] (ref 5–8)
PLATELET # BLD AUTO: 267 K/UL
PMV BLD AUTO: 9.9 FL
POTASSIUM SERPL-SCNC: 3.4 MMOL/L
PROT SERPL-MCNC: 6.5 G/DL
PROT UR QL STRIP: NEGATIVE
RBC # BLD AUTO: 3.25 M/UL
RBC #/AREA URNS HPF: 8 /HPF (ref 0–4)
SODIUM SERPL-SCNC: 141 MMOL/L
SP GR UR STRIP: 1.01 (ref 1–1.03)
SQUAMOUS #/AREA URNS HPF: 4 /HPF
URN SPEC COLLECT METH UR: ABNORMAL
UROBILINOGEN UR STRIP-ACNC: NEGATIVE EU/DL
WBC # BLD AUTO: 7.11 K/UL
WBC #/AREA URNS HPF: 7 /HPF (ref 0–5)

## 2018-01-05 PROCEDURE — 81000 URINALYSIS NONAUTO W/SCOPE: CPT

## 2018-01-05 PROCEDURE — 63600175 PHARM REV CODE 636 W HCPCS: Performed by: STUDENT IN AN ORGANIZED HEALTH CARE EDUCATION/TRAINING PROGRAM

## 2018-01-05 PROCEDURE — 94761 N-INVAS EAR/PLS OXIMETRY MLT: CPT

## 2018-01-05 PROCEDURE — 11000001 HC ACUTE MED/SURG PRIVATE ROOM

## 2018-01-05 PROCEDURE — 25000003 PHARM REV CODE 250: Performed by: EMERGENCY MEDICINE

## 2018-01-05 PROCEDURE — 96368 THER/DIAG CONCURRENT INF: CPT

## 2018-01-05 PROCEDURE — 25000003 PHARM REV CODE 250: Performed by: STUDENT IN AN ORGANIZED HEALTH CARE EDUCATION/TRAINING PROGRAM

## 2018-01-05 PROCEDURE — 96375 TX/PRO/DX INJ NEW DRUG ADDON: CPT

## 2018-01-05 PROCEDURE — 25000003 PHARM REV CODE 250: Performed by: SURGERY

## 2018-01-05 PROCEDURE — 80053 COMPREHEN METABOLIC PANEL: CPT

## 2018-01-05 PROCEDURE — 85025 COMPLETE CBC W/AUTO DIFF WBC: CPT

## 2018-01-05 PROCEDURE — 96366 THER/PROPH/DIAG IV INF ADDON: CPT

## 2018-01-05 PROCEDURE — 63600175 PHARM REV CODE 636 W HCPCS: Performed by: EMERGENCY MEDICINE

## 2018-01-05 PROCEDURE — 96365 THER/PROPH/DIAG IV INF INIT: CPT

## 2018-01-05 PROCEDURE — 96361 HYDRATE IV INFUSION ADD-ON: CPT

## 2018-01-05 RX ORDER — POTASSIUM CHLORIDE 20 MEQ/1
40 TABLET, EXTENDED RELEASE ORAL 2 TIMES DAILY
Status: COMPLETED | OUTPATIENT
Start: 2018-01-05 | End: 2018-01-07

## 2018-01-05 RX ORDER — OXYCODONE AND ACETAMINOPHEN 5; 325 MG/1; MG/1
1 TABLET ORAL EVERY 6 HOURS PRN
Status: DISCONTINUED | OUTPATIENT
Start: 2018-01-05 | End: 2018-01-08 | Stop reason: HOSPADM

## 2018-01-05 RX ORDER — DIPHENOXYLATE HYDROCHLORIDE AND ATROPINE SULFATE 2.5; .025 MG/1; MG/1
1 TABLET ORAL 4 TIMES DAILY PRN
Status: DISCONTINUED | OUTPATIENT
Start: 2018-01-05 | End: 2018-01-08 | Stop reason: HOSPADM

## 2018-01-05 RX ORDER — METOPROLOL TARTRATE 50 MG/1
50 TABLET ORAL 2 TIMES DAILY
Status: DISCONTINUED | OUTPATIENT
Start: 2018-01-05 | End: 2018-01-08 | Stop reason: HOSPADM

## 2018-01-05 RX ORDER — CIPROFLOXACIN 2 MG/ML
400 INJECTION, SOLUTION INTRAVENOUS
Status: DISCONTINUED | OUTPATIENT
Start: 2018-01-05 | End: 2018-01-08 | Stop reason: HOSPADM

## 2018-01-05 RX ORDER — HYDRALAZINE HYDROCHLORIDE 20 MG/ML
10 INJECTION INTRAMUSCULAR; INTRAVENOUS EVERY 6 HOURS PRN
Status: DISCONTINUED | OUTPATIENT
Start: 2018-01-05 | End: 2018-01-08 | Stop reason: HOSPADM

## 2018-01-05 RX ORDER — KETOROLAC TROMETHAMINE 10 MG/1
10 TABLET, FILM COATED ORAL EVERY 6 HOURS PRN
Status: DISCONTINUED | OUTPATIENT
Start: 2018-01-05 | End: 2018-01-08 | Stop reason: HOSPADM

## 2018-01-05 RX ORDER — ZOLPIDEM TARTRATE 5 MG/1
5 TABLET ORAL NIGHTLY PRN
Status: DISCONTINUED | OUTPATIENT
Start: 2018-01-05 | End: 2018-01-08 | Stop reason: HOSPADM

## 2018-01-05 RX ORDER — CHOLESTYRAMINE 4 G/9G
1 POWDER, FOR SUSPENSION ORAL 2 TIMES DAILY
Status: DISCONTINUED | OUTPATIENT
Start: 2018-01-05 | End: 2018-01-08 | Stop reason: HOSPADM

## 2018-01-05 RX ORDER — HYDROMORPHONE HYDROCHLORIDE 2 MG/ML
0.5 INJECTION, SOLUTION INTRAMUSCULAR; INTRAVENOUS; SUBCUTANEOUS ONCE
Status: COMPLETED | OUTPATIENT
Start: 2018-01-05 | End: 2018-01-05

## 2018-01-05 RX ORDER — LOSARTAN POTASSIUM 50 MG/1
50 TABLET ORAL DAILY
Status: DISCONTINUED | OUTPATIENT
Start: 2018-01-05 | End: 2018-01-08 | Stop reason: HOSPADM

## 2018-01-05 RX ORDER — POTASSIUM CHLORIDE 20 MEQ/15ML
40 SOLUTION ORAL 2 TIMES DAILY
Status: DISCONTINUED | OUTPATIENT
Start: 2018-01-05 | End: 2018-01-05

## 2018-01-05 RX ORDER — MORPHINE SULFATE 2 MG/ML
2 INJECTION, SOLUTION INTRAMUSCULAR; INTRAVENOUS ONCE
Status: DISCONTINUED | OUTPATIENT
Start: 2018-01-05 | End: 2018-01-05

## 2018-01-05 RX ORDER — GABAPENTIN 100 MG/1
100 CAPSULE ORAL 2 TIMES DAILY
Status: DISCONTINUED | OUTPATIENT
Start: 2018-01-05 | End: 2018-01-08 | Stop reason: HOSPADM

## 2018-01-05 RX ADMIN — ZOLPIDEM TARTRATE 5 MG: 5 TABLET, FILM COATED ORAL at 08:01

## 2018-01-05 RX ADMIN — HYDRALAZINE HYDROCHLORIDE 10 MG: 20 INJECTION INTRAMUSCULAR; INTRAVENOUS at 05:01

## 2018-01-05 RX ADMIN — POTASSIUM CHLORIDE 40 MEQ: 20 SOLUTION ORAL at 08:01

## 2018-01-05 RX ADMIN — METRONIDAZOLE 500 MG: 500 TABLET ORAL at 08:01

## 2018-01-05 RX ADMIN — GABAPENTIN 100 MG: 100 CAPSULE ORAL at 08:01

## 2018-01-05 RX ADMIN — HYDROMORPHONE HYDROCHLORIDE 0.5 MG: 2 INJECTION INTRAMUSCULAR; INTRAVENOUS; SUBCUTANEOUS at 12:01

## 2018-01-05 RX ADMIN — HYDRALAZINE HYDROCHLORIDE 10 MG: 20 INJECTION INTRAMUSCULAR; INTRAVENOUS at 09:01

## 2018-01-05 RX ADMIN — SODIUM CHLORIDE: 0.9 INJECTION, SOLUTION INTRAVENOUS at 12:01

## 2018-01-05 RX ADMIN — OXYCODONE AND ACETAMINOPHEN 1 TABLET: 5; 325 TABLET ORAL at 04:01

## 2018-01-05 RX ADMIN — HYDROMORPHONE HYDROCHLORIDE 0.5 MG: 2 INJECTION, SOLUTION INTRAMUSCULAR; INTRAVENOUS; SUBCUTANEOUS at 04:01

## 2018-01-05 RX ADMIN — CHOLESTYRAMINE 4 G: 4 POWDER, FOR SUSPENSION ORAL at 11:01

## 2018-01-05 RX ADMIN — CHOLESTYRAMINE 4 G: 4 POWDER, FOR SUSPENSION ORAL at 08:01

## 2018-01-05 RX ADMIN — CIPROFLOXACIN 400 MG: 2 INJECTION, SOLUTION INTRAVENOUS at 08:01

## 2018-01-05 RX ADMIN — LOSARTAN POTASSIUM 50 MG: 50 TABLET, FILM COATED ORAL at 11:01

## 2018-01-05 RX ADMIN — METRONIDAZOLE 500 MG: 500 TABLET ORAL at 02:01

## 2018-01-05 RX ADMIN — DIPHENOXYLATE HYDROCHLORIDE AND ATROPINE SULFATE 1 TABLET: 2.5; .025 TABLET ORAL at 08:01

## 2018-01-05 RX ADMIN — KETOROLAC TROMETHAMINE 10 MG: 10 TABLET, FILM COATED ORAL at 08:01

## 2018-01-05 RX ADMIN — POTASSIUM CHLORIDE 40 MEQ: 20 TABLET, EXTENDED RELEASE ORAL at 09:01

## 2018-01-05 RX ADMIN — METOPROLOL TARTRATE 50 MG: 50 TABLET ORAL at 11:01

## 2018-01-05 RX ADMIN — METRONIDAZOLE 500 MG: 500 TABLET ORAL at 05:01

## 2018-01-05 RX ADMIN — DIPHENOXYLATE HYDROCHLORIDE AND ATROPINE SULFATE 1 TABLET: 2.5; .025 TABLET ORAL at 05:01

## 2018-01-05 RX ADMIN — METOPROLOL TARTRATE 50 MG: 50 TABLET ORAL at 08:01

## 2018-01-05 NOTE — H&P
"Surgery H&P     CC:  Persistent diarrhea     HPI: 67yF POD#6 ope leslie, hx met NET returns to ED with non bloody diarrhea. Reports 5-6 BM daily. Able to eat and keep liquids and food down, however continues to have diarrhea. Reports BM are soft, still formed, not foul smelling. abd pain @ incision site otherwise no abd pain. Eating eggs, biscuits, "regular" food at home. BM are worse after meals. Patient also on abx therapy for UTI from last admission. Denies fevers/chills. No other sick contacts. No new foods. Voiding well, urine clear. 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)        Past Surgical History:   Procedure Laterality Date    CATARACT EXTRACTION Left 10/2017    COLON SURGERY      cystoscope      EYE SURGERY      HYSTERECTOMY  5/1996    LITHOTRIPSY      LIVER BIOPSY  9/14    carcinoid       Family History   Problem Relation Age of Onset    Cancer Mother      unknown    Alzheimer's disease Father     Stroke Sister     No Known Problems Son     No Known Problems Son     No Known Problems Son     No Known Problems Son        Social History     Social History    Marital status:      Spouse name: N/A    Number of children: N/A    Years of education: N/A     Occupational History    disabled       Social History Main Topics    Smoking status: Never Smoker    Smokeless tobacco: Never Used    Alcohol use No    Drug use: No    Sexual activity: Not on file     Other Topics Concern    Not on file     Social History Narrative    No narrative on file           Temp:  [97.6 °F (36.4 °C)-98.7 °F (37.1 °C)] 98.3 °F (36.8 °C)  Pulse:  [71-82] 79  Resp:  [15-23] 20  SpO2:  [95 %-99 %] 98 %  BP: (149-180)/(53-82) " 180/58      Physical Exam:   NAD   RR   Non labored breathing   abd soft TTP @ incision. Clean/dry/intact. Mild fullness     Labs:   Recent Results (from the past 336 hour(s))   CBC auto differential    Collection Time: 01/05/18  4:36 AM   Result Value Ref Range    WBC 7.11 3.90 - 12.70 K/uL    Hemoglobin 8.8 (L) 12.0 - 16.0 g/dL    Hematocrit 28.1 (L) 37.0 - 48.5 %    Platelets 267 150 - 350 K/uL   CBC W/ AUTO DIFFERENTIAL    Collection Time: 01/04/18  7:29 PM   Result Value Ref Range    WBC 7.21 3.90 - 12.70 K/uL    Hemoglobin 9.6 (L) 12.0 - 16.0 g/dL    Hematocrit 31.0 (L) 37.0 - 48.5 %    Platelets 229 150 - 350 K/uL   CBC with Automated Differential    Collection Time: 01/02/18  5:11 AM   Result Value Ref Range    WBC 8.39 3.90 - 12.70 K/uL    Hemoglobin 8.6 (L) 12.0 - 16.0 g/dL    Hematocrit 27.6 (L) 37.0 - 48.5 %    Platelets 188 150 - 350 K/uL     BMP  Lab Results   Component Value Date     01/05/2018    K 3.4 (L) 01/05/2018     01/05/2018    CO2 28 01/05/2018    BUN 10 01/05/2018    CREATININE 0.8 01/05/2018    CALCIUM 8.8 01/05/2018    ANIONGAP 9 01/05/2018    ESTGFRAFRICA >60 01/05/2018    EGFRNONAA >60 01/05/2018     Lab Results   Component Value Date    ALT 10 01/05/2018    AST 9 (L) 01/05/2018    ALKPHOS 91 01/05/2018    BILITOT 0.4 01/05/2018       Results:   Microbiology Results (last 7 days)     Procedure Component Value Units Date/Time    Clostridium difficile EIA [[id number]] Collected:  01/04/18 6884    Order Status:  Completed Specimen:  Stool from Stool Updated:  01/05/18 0020     C. diff Antigen Negative     C difficile Toxins A+B, EIA Negative     Comment: Testing not recommended for children <24 months old.             Imaging:    CT abd/pelvis: ill-defined 5.8 cm air and fluid collection seen within GB which could represent potential developing abscess.     A/P: 67yF POD#6 lap leslie, met NET, persistent diarrhea   - admit to surgery   - npo, IVF   - c.diff negative   - possible  HIDA eval bile leak, however suspicion low   - will review images with Dr Perez, use of surgicell during case or other hemostatic agents given no leukocytosis   - IV abx   - diarrhea - possibly related to recent cholecystectomy and previous R colectomy and short transit time. May require cholestyramine   - BP control     Luli Paula MD  7:42 AM  [employer] General Surgery PGY-IV

## 2018-01-05 NOTE — ED NOTES
Report received from PANCH[facility]O [facility]. Care assumed. NAD noted. Pt assisted to bedside commode. Call light within reach.

## 2018-01-05 NOTE — ED NOTES
Updated patient on plan of care. Dr. Sánchez  Will see patient in AM and she will stay in ER overnight.  Patient understands

## 2018-01-05 NOTE — PROGRESS NOTES
Patient is an admit from ED.  Patient is constantly crying, whining and complaining of being in pain, room being very cold (patient has four blankets on at this time).  At times screaming from intermittent pain.  Emotional support provided.  Patient on phone with son and daughter-in-law numerous times complaining of pain and cold.  Explained to patient the reason why narcotics are not given at this time.  BP is elevated and all BP medications given.  Sandostatin started on patient.  Safety is maintained with bed low, wheels locked and side rails up.  Will continue to monitor.

## 2018-01-05 NOTE — PLAN OF CARE
Problem: Patient Care Overview  Goal: Plan of Care Review  Patient is awake, alert, and oriented.  Patient has been complaining of pain and pain medications have been given as per requests.  Patient also has diarrheal episodes and Lomotil is given.  Patient's son visited.  Patient is receiving IVF and Sandostatin at this time.  Safety maintained.  Will continue to monitor.

## 2018-01-05 NOTE — ED NOTES
Bedside commode placed at bedside. Instructed patient when ready we would need to get a stool sample. Patient has not had any diarrhea since coming into ER

## 2018-01-05 NOTE — ED TRIAGE NOTES
Pt reports had open cholecystectomy on Friday before New Years by Dr Perez, states taking hydrocodone and nitrofurantoin abx.  States onset with diarrhea before surgery that resolved but then returned after surgery and has persisted.  Pt also reports RUQ intermittent incisional pains.

## 2018-01-05 NOTE — ED PROVIDER NOTES
Encounter Date: 1/4/2018       History     Chief Complaint   Patient presents with    Diarrhea     pt had gallbladder removed by Dr. Perez a few days ago. C/o pain to incision site and diarrhea since the surgery.      Patient is a 67-year-old female past medical history of hypertension status post cholecystectomy postop day #5 presents emergency room reporting perfuse watery diarrhea and intermittent abdominal pain since going home.  Nausea and nausea vomiting fever chills weakness describes diarrhea as watery without blood or mucus.          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)      Past Surgical History:   Procedure Laterality Date    CATARACT EXTRACTION Left 10/2017    COLON SURGERY      cystoscope      EYE SURGERY      HYSTERECTOMY  5/1996    LITHOTRIPSY      LIVER BIOPSY  9/14    carcinoid     Family History   Problem Relation Age of Onset    Cancer Mother      unknown    Alzheimer's disease Father     Stroke Sister     No Known Problems Son     No Known Problems Son     No Known Problems Son     No Known Problems Son      Social History   Substance Use Topics    Smoking status: Never Smoker    Smokeless tobacco: Never Used    Alcohol use No     Review of Systems   Constitutional: Negative.    HENT: Negative.    Eyes: Negative.    Respiratory: Negative.    Cardiovascular: Negative.    Gastrointestinal: Positive for abdominal pain and diarrhea.   Endocrine: Negative.    Genitourinary: Negative.    Musculoskeletal: Negative.    Skin:  Negative.    Allergic/Immunologic: Negative.    Neurological: Negative.    Hematological: Negative.    Psychiatric/Behavioral: Negative.    All other systems reviewed and are negative.      Physical Exam     Initial Vitals [01/04/18 1740]   BP Pulse Resp Temp SpO2   (!) 170/74 71 20 97.6 °F (36.4 °C) 98 %      MAP       106         Physical Exam    Nursing note and vitals reviewed.  Constitutional: She appears well-developed and well-nourished.   Well Appearing   HENT:   Head: Normocephalic and atraumatic.   Eyes: EOM are normal. Pupils are equal, round, and reactive to light.   Neck: Normal range of motion. Neck supple.   Cardiovascular: Normal rate, regular rhythm and normal heart sounds.   Pulmonary/Chest: Breath sounds normal.   Abdominal: Soft. Bowel sounds are normal. She exhibits no distension and no mass. There is no rebound and no guarding.   Surgical scar right upper quadrant:  C/D/I,   Abd: NTTP, BS Nl,    Musculoskeletal: Normal range of motion.   Neurological: She is alert and oriented to person, place, and time. She has normal strength and normal reflexes.   Skin: Skin is warm and dry. Capillary refill takes less than 2 seconds.   Psychiatric: She has a normal mood and affect. Her behavior is normal. Judgment and thought content normal.         ED Course   Procedures  Labs Reviewed   CBC W/ AUTO DIFFERENTIAL - Abnormal; Notable for the following:        Result Value    RBC 3.51 (*)     Hemoglobin 9.6 (*)     Hematocrit 31.0 (*)     MCHC 31.0 (*)     Lymph% 17.9 (*)     All other components within normal limits   COMPREHENSIVE METABOLIC PANEL - Abnormal; Notable for the following:     Potassium 3.1 (*)     Albumin 2.9 (*)     AST 9 (*)     All other components within normal limits   CLOSTRIDIUM DIFFICILE   LIPASE   URINALYSIS             Medical Decision Making:   Initial Assessment:   Patient is a 67-year-old female past medical history of hypertension status post cholecystectomy postop day #5 presents  emergency room reporting perfuse watery diarrhea and intermittent abdominal pain since going home.  Nausea and nausea vomiting fever chills weakness describes diarrhea as watery without blood or mucus.  Differential Diagnosis:   C.dff  colitis  Clinical Tests:   Lab Tests: Ordered and Reviewed  The following lab test(s) were unremarkable: CBC and CMP  Radiological Study: Ordered and Reviewed  ED Management:  Pt given IV hydration, IV pain medication and IV nausea meds.  Pt discussed with Dr. Mathews, who agreed with plan to admit for intractable and dehydration.                   ED Course      Clinical Impression:   The primary encounter diagnosis was Colitis. A diagnosis of Intractable pain was also pertinent to this visit.                           Melo Taylor MD  01/04/18 9045

## 2018-01-05 NOTE — ED NOTES
Patient was able to provide urine sample. Only a couple very small pieces of stool mixed in. Patient given another jello and ice chips. Another pillow positioned under right side for comfort. Lights turned back off

## 2018-01-06 LAB
ALBUMIN SERPL BCP-MCNC: 2.7 G/DL
ALP SERPL-CCNC: 87 U/L
ALT SERPL W/O P-5'-P-CCNC: 9 U/L
ANION GAP SERPL CALC-SCNC: 10 MMOL/L
AST SERPL-CCNC: 9 U/L
BASOPHILS # BLD AUTO: 0.04 K/UL
BASOPHILS NFR BLD: 0.5 %
BILIRUB SERPL-MCNC: 0.5 MG/DL
BUN SERPL-MCNC: 7 MG/DL
CALCIUM SERPL-MCNC: 8.8 MG/DL
CHLORIDE SERPL-SCNC: 105 MMOL/L
CO2 SERPL-SCNC: 25 MMOL/L
CREAT SERPL-MCNC: 0.8 MG/DL
DIFFERENTIAL METHOD: ABNORMAL
EOSINOPHIL # BLD AUTO: 0.3 K/UL
EOSINOPHIL NFR BLD: 4 %
ERYTHROCYTE [DISTWIDTH] IN BLOOD BY AUTOMATED COUNT: 14.2 %
EST. GFR  (AFRICAN AMERICAN): >60 ML/MIN/1.73 M^2
EST. GFR  (NON AFRICAN AMERICAN): >60 ML/MIN/1.73 M^2
GLUCOSE SERPL-MCNC: 117 MG/DL
HCT VFR BLD AUTO: 28.4 %
HGB BLD-MCNC: 9.2 G/DL
LYMPHOCYTES # BLD AUTO: 1 K/UL
LYMPHOCYTES NFR BLD: 12.1 %
MCH RBC QN AUTO: 27.7 PG
MCHC RBC AUTO-ENTMCNC: 32.4 G/DL
MCV RBC AUTO: 86 FL
MONOCYTES # BLD AUTO: 0.6 K/UL
MONOCYTES NFR BLD: 7.5 %
NEUTROPHILS # BLD AUTO: 6 K/UL
NEUTROPHILS NFR BLD: 74.9 %
PLATELET # BLD AUTO: 320 K/UL
PMV BLD AUTO: 10.1 FL
POTASSIUM SERPL-SCNC: 3.2 MMOL/L
PROT SERPL-MCNC: 6.4 G/DL
RBC # BLD AUTO: 3.32 M/UL
SODIUM SERPL-SCNC: 140 MMOL/L
WBC # BLD AUTO: 8.01 K/UL

## 2018-01-06 PROCEDURE — 25000003 PHARM REV CODE 250: Performed by: STUDENT IN AN ORGANIZED HEALTH CARE EDUCATION/TRAINING PROGRAM

## 2018-01-06 PROCEDURE — 94761 N-INVAS EAR/PLS OXIMETRY MLT: CPT

## 2018-01-06 PROCEDURE — 63600175 PHARM REV CODE 636 W HCPCS: Performed by: SURGERY

## 2018-01-06 PROCEDURE — 25000003 PHARM REV CODE 250: Performed by: SURGERY

## 2018-01-06 PROCEDURE — 85025 COMPLETE CBC W/AUTO DIFF WBC: CPT

## 2018-01-06 PROCEDURE — 63600175 PHARM REV CODE 636 W HCPCS: Performed by: STUDENT IN AN ORGANIZED HEALTH CARE EDUCATION/TRAINING PROGRAM

## 2018-01-06 PROCEDURE — 36415 COLL VENOUS BLD VENIPUNCTURE: CPT

## 2018-01-06 PROCEDURE — 80053 COMPREHEN METABOLIC PANEL: CPT

## 2018-01-06 PROCEDURE — 25000003 PHARM REV CODE 250: Performed by: EMERGENCY MEDICINE

## 2018-01-06 PROCEDURE — 11000001 HC ACUTE MED/SURG PRIVATE ROOM

## 2018-01-06 RX ORDER — ONDANSETRON 2 MG/ML
8 INJECTION INTRAMUSCULAR; INTRAVENOUS ONCE
Status: COMPLETED | OUTPATIENT
Start: 2018-01-06 | End: 2018-01-06

## 2018-01-06 RX ORDER — HYDROMORPHONE HYDROCHLORIDE 2 MG/ML
0.5 INJECTION, SOLUTION INTRAMUSCULAR; INTRAVENOUS; SUBCUTANEOUS
Status: DISCONTINUED | OUTPATIENT
Start: 2018-01-06 | End: 2018-01-08 | Stop reason: HOSPADM

## 2018-01-06 RX ADMIN — KETOROLAC TROMETHAMINE 10 MG: 10 TABLET, FILM COATED ORAL at 02:01

## 2018-01-06 RX ADMIN — DIPHENOXYLATE HYDROCHLORIDE AND ATROPINE SULFATE 1 TABLET: 2.5; .025 TABLET ORAL at 08:01

## 2018-01-06 RX ADMIN — CHOLESTYRAMINE 4 G: 4 POWDER, FOR SUSPENSION ORAL at 08:01

## 2018-01-06 RX ADMIN — ZOLPIDEM TARTRATE 5 MG: 5 TABLET, FILM COATED ORAL at 10:01

## 2018-01-06 RX ADMIN — POTASSIUM CHLORIDE 40 MEQ: 20 TABLET, EXTENDED RELEASE ORAL at 08:01

## 2018-01-06 RX ADMIN — KETOROLAC TROMETHAMINE 10 MG: 10 TABLET, FILM COATED ORAL at 08:01

## 2018-01-06 RX ADMIN — METRONIDAZOLE 500 MG: 500 TABLET ORAL at 10:01

## 2018-01-06 RX ADMIN — GABAPENTIN 100 MG: 100 CAPSULE ORAL at 08:01

## 2018-01-06 RX ADMIN — CIPROFLOXACIN 400 MG: 2 INJECTION, SOLUTION INTRAVENOUS at 08:01

## 2018-01-06 RX ADMIN — METRONIDAZOLE 500 MG: 500 TABLET ORAL at 02:01

## 2018-01-06 RX ADMIN — LOSARTAN POTASSIUM 50 MG: 50 TABLET, FILM COATED ORAL at 08:01

## 2018-01-06 RX ADMIN — METOPROLOL TARTRATE 50 MG: 50 TABLET ORAL at 08:01

## 2018-01-06 RX ADMIN — HYDROMORPHONE HYDROCHLORIDE 0.5 MG: 2 INJECTION, SOLUTION INTRAMUSCULAR; INTRAVENOUS; SUBCUTANEOUS at 04:01

## 2018-01-06 RX ADMIN — HYDRALAZINE HYDROCHLORIDE 10 MG: 20 INJECTION INTRAMUSCULAR; INTRAVENOUS at 04:01

## 2018-01-06 RX ADMIN — HYDROMORPHONE HYDROCHLORIDE 0.5 MG: 2 INJECTION, SOLUTION INTRAMUSCULAR; INTRAVENOUS; SUBCUTANEOUS at 05:01

## 2018-01-06 RX ADMIN — ONDANSETRON 8 MG: 2 INJECTION INTRAMUSCULAR; INTRAVENOUS at 08:01

## 2018-01-06 RX ADMIN — HYDROMORPHONE HYDROCHLORIDE 0.5 MG: 2 INJECTION, SOLUTION INTRAMUSCULAR; INTRAVENOUS; SUBCUTANEOUS at 11:01

## 2018-01-06 RX ADMIN — SODIUM CHLORIDE: 0.9 INJECTION, SOLUTION INTRAVENOUS at 04:01

## 2018-01-06 RX ADMIN — OCTREOTIDE ACETATE 250 MCG/HR: 1000 INJECTION, SOLUTION INTRAVENOUS; SUBCUTANEOUS at 10:01

## 2018-01-06 RX ADMIN — DIPHENOXYLATE HYDROCHLORIDE AND ATROPINE SULFATE 1 TABLET: 2.5; .025 TABLET ORAL at 03:01

## 2018-01-06 RX ADMIN — HYDROMORPHONE HYDROCHLORIDE 0.5 MG: 2 INJECTION, SOLUTION INTRAMUSCULAR; INTRAVENOUS; SUBCUTANEOUS at 08:01

## 2018-01-06 RX ADMIN — METRONIDAZOLE 500 MG: 500 TABLET ORAL at 04:01

## 2018-01-06 RX ADMIN — OXYCODONE AND ACETAMINOPHEN 1 TABLET: 5; 325 TABLET ORAL at 01:01

## 2018-01-06 NOTE — PLAN OF CARE
Problem: Pain, Acute (Adult)  Goal: Acceptable Pain Control/Comfort Level  Patient will demonstrate the desired outcomes by discharge/transition of care.   Outcome: Ongoing (interventions implemented as appropriate)  Administer prescribed pain meds as order. Pain to 0/10 by the end of shift.

## 2018-01-06 NOTE — PLAN OF CARE
Dr Jones called and informed of pt c/o severe abdominal pain. informed that ketorlac 10mg and oxycodone 5/325 were given and continues with the pain. New order dilaudid 0.5mg IV Q3 hr prn pain ordered. Ordered repeated to MD, verified.

## 2018-01-06 NOTE — PLAN OF CARE
Chief Complaint   Patient presents with    Diarrhea       pt had gallbladder removed by Dr. Perez a few days ago. C/o pain to incision site and diarrhea since the surgery     Pt was independent with ADLs, no HH, has RW, Shower chair, BSC. Lives with adult son and daughter in law.  Pt requesting HH (SN/Aide) on D/C. Family can provide transportation on discharge       01/06/18 1550   Discharge Assessment   Assessment Type Discharge Planning Assessment   Confirmed/corrected address and phone number on facesheet? Yes   Assessment information obtained from? Patient   Expected Length of Stay (days) 2   Communicated expected length of stay with patient/caregiver yes   Prior to hospitilization cognitive status: Alert/Oriented   Prior to hospitalization functional status: Independent   Current cognitive status: Alert/Oriented   Current Functional Status: Independent   Facility Arrived From: (home)   Lives With child(katherine), adult  (son and daughter in law)   Able to Return to Prior Arrangements yes   Is patient able to care for self after discharge? Unable to determine at this time (comments)   Who are your caregiver(s) and their phone number(s)? Sons: Dallas Mayurmijossy [phone], Matias Domingo  [phone]   Patient's perception of discharge disposition home health   Readmission Within The Last 30 Days other (see comments)  (last admit 12/29-1/2/17 for galbladder removal, this admit dirrhea since surgery)   Patient currently being followed by outpatient case management? No   Patient currently receives any other outside agency services? No   Equipment Currently Used at Home bath bench;walker, rolling;bedside commode   Do you have any problems affording any of your prescribed medications? No   Is the patient taking medications as prescribed? yes   Does the patient have transportation home? Yes   Transportation Available family or friend will provide   Dialysis Name and Scheduled days N/A   Does the patient  receive services at the [facility]? No   Discharge Plan A Home Health   Discharge Plan B Home with family   Patient/Family In Agreement With Plan yes     Marcie Koehler RN Transitional Navigator  [phone]

## 2018-01-06 NOTE — PROGRESS NOTES
Pt c/o nausea without emesis. MD called and notified. New order noted. TO/RBV. Will continue to monitor.

## 2018-01-06 NOTE — PROGRESS NOTES
Surgery Progress Note     NAEO.  Continues to complain of pain overnight.  Diarrhea improved on octreotide.  Complains of occasional nausea but no emesis.  Voiding w/ no issues.  Pt afebrile over 24 hours.    Tmax: 98.6   HR: 72-94   RR: 16-18   BP: 144-169/66-72   SpO2:     PE: General: AAO X 3; NAD         Abdomen: soft; NT/ND; RUQ incision c/d/i w/ no drainage or erythema    HIDA: no evidence of bile leak    A/P: 68 y/o F w/ hx of metastatic NET s/p open cholecystectomy POD 7 w/ persistent diarrhea and abdominal pain  - diarrhea improved; will d/c octreotide  - ok for clears; ADAT  - h/l IV  - d/c cardiac monitoring  - encourage ambulation; IS use

## 2018-01-06 NOTE — PLAN OF CARE
/84 HR 83 SR on telemetry monitor pre-administration of prn dose of hydralazine.pt asymptomatic. Francia called to monitor rhythm during medication administration over 3 minutes. Pt remained SR 80s on monitor. /79 after hydralazine IVP. Pt tolerated well. No complaints voiced.

## 2018-01-06 NOTE — PLAN OF CARE
01/06/18 1547   Readmission Questionnaire   At the time of your discharge, did someone talk to you about what your health problems were? Yes   At the time of discharge, did someone talk to you about what to watch out for regarding worsening of your health problem? Yes   At the time of discharge, did someone talk to you about what to do if you experienced worsening of your health problem? Yes   At the time of discharge, did someone talk to you about which medication to take when you left the hospital and which ones to stop taking? Yes   At the time of discharge, did someone talk to you about when and where to follow up with a doctor after you left the hospital? Yes   What do you believe caused you to be sick enough to be re-admitted? diarrhea since gallbladder removal surgery   How often do you need to have someone help you when you read instructions, pamphlets, or other written material from your doctor or pharmacy? Never   Do you have problems taking your medications as prescribed? No   Do you have any problems affording any of  your prescribed medications? No   Do you have problems obtaining/receiving your medications? No   Does the patient have transportation to healthcare appointments? Yes   Lives With child(katherine), adult   Living Arrangements house  (live with son, Dallas and daughter in law)   Does the patient have family/friends to help with healtcare needs after discharge? yes   Who are your caregiver(s) and their phone number(s)? Elmo Domingo [phone], Matias Domingo Jr [phone]   Does your caregiver provide all the help you need? Yes   Are you currently feeling confused? No   Are you currently having problems thinking? No   Are you currently having memory problems? No   Have you felt down, depressed, or hopeless? 0   Have you felt little interest or pleasure in doing things? 0   In the last 7 days, my sleep quality was: good     Marcie Koehler, RN Transitional Navigator  [phone]

## 2018-01-07 LAB
ALBUMIN SERPL BCP-MCNC: 2.6 G/DL
ALP SERPL-CCNC: 78 U/L
ALT SERPL W/O P-5'-P-CCNC: 7 U/L
ANION GAP SERPL CALC-SCNC: 6 MMOL/L
AST SERPL-CCNC: 9 U/L
BASOPHILS # BLD AUTO: 0.03 K/UL
BASOPHILS NFR BLD: 0.5 %
BILIRUB SERPL-MCNC: 0.4 MG/DL
BUN SERPL-MCNC: 10 MG/DL
CALCIUM SERPL-MCNC: 8.7 MG/DL
CHLORIDE SERPL-SCNC: 110 MMOL/L
CO2 SERPL-SCNC: 25 MMOL/L
CREAT SERPL-MCNC: 0.8 MG/DL
DIFFERENTIAL METHOD: ABNORMAL
EOSINOPHIL # BLD AUTO: 0.4 K/UL
EOSINOPHIL NFR BLD: 5.6 %
ERYTHROCYTE [DISTWIDTH] IN BLOOD BY AUTOMATED COUNT: 14.6 %
EST. GFR  (AFRICAN AMERICAN): >60 ML/MIN/1.73 M^2
EST. GFR  (NON AFRICAN AMERICAN): >60 ML/MIN/1.73 M^2
GLUCOSE SERPL-MCNC: 113 MG/DL
HCT VFR BLD AUTO: 27.4 %
HGB BLD-MCNC: 8.7 G/DL
LYMPHOCYTES # BLD AUTO: 1.5 K/UL
LYMPHOCYTES NFR BLD: 22.7 %
MCH RBC QN AUTO: 27.4 PG
MCHC RBC AUTO-ENTMCNC: 31.8 G/DL
MCV RBC AUTO: 86 FL
MONOCYTES # BLD AUTO: 0.5 K/UL
MONOCYTES NFR BLD: 7.1 %
NEUTROPHILS # BLD AUTO: 4.1 K/UL
NEUTROPHILS NFR BLD: 63.2 %
PLATELET # BLD AUTO: 279 K/UL
PMV BLD AUTO: 9.1 FL
POTASSIUM SERPL-SCNC: 3.5 MMOL/L
PROT SERPL-MCNC: 5.9 G/DL
RBC # BLD AUTO: 3.18 M/UL
SODIUM SERPL-SCNC: 141 MMOL/L
WBC # BLD AUTO: 6.44 K/UL

## 2018-01-07 PROCEDURE — 25000003 PHARM REV CODE 250: Performed by: SURGERY

## 2018-01-07 PROCEDURE — 80053 COMPREHEN METABOLIC PANEL: CPT

## 2018-01-07 PROCEDURE — 85025 COMPLETE CBC W/AUTO DIFF WBC: CPT

## 2018-01-07 PROCEDURE — 25000003 PHARM REV CODE 250: Performed by: STUDENT IN AN ORGANIZED HEALTH CARE EDUCATION/TRAINING PROGRAM

## 2018-01-07 PROCEDURE — 63600175 PHARM REV CODE 636 W HCPCS: Performed by: STUDENT IN AN ORGANIZED HEALTH CARE EDUCATION/TRAINING PROGRAM

## 2018-01-07 PROCEDURE — 36415 COLL VENOUS BLD VENIPUNCTURE: CPT

## 2018-01-07 PROCEDURE — 94761 N-INVAS EAR/PLS OXIMETRY MLT: CPT

## 2018-01-07 PROCEDURE — 11000001 HC ACUTE MED/SURG PRIVATE ROOM

## 2018-01-07 PROCEDURE — 25000003 PHARM REV CODE 250: Performed by: EMERGENCY MEDICINE

## 2018-01-07 RX ADMIN — HYDROMORPHONE HYDROCHLORIDE 0.5 MG: 2 INJECTION, SOLUTION INTRAMUSCULAR; INTRAVENOUS; SUBCUTANEOUS at 07:01

## 2018-01-07 RX ADMIN — METRONIDAZOLE 500 MG: 500 TABLET ORAL at 01:01

## 2018-01-07 RX ADMIN — HYDROMORPHONE HYDROCHLORIDE 0.5 MG: 2 INJECTION, SOLUTION INTRAMUSCULAR; INTRAVENOUS; SUBCUTANEOUS at 12:01

## 2018-01-07 RX ADMIN — HYDROMORPHONE HYDROCHLORIDE 0.5 MG: 2 INJECTION, SOLUTION INTRAMUSCULAR; INTRAVENOUS; SUBCUTANEOUS at 09:01

## 2018-01-07 RX ADMIN — HYDROMORPHONE HYDROCHLORIDE 0.5 MG: 2 INJECTION, SOLUTION INTRAMUSCULAR; INTRAVENOUS; SUBCUTANEOUS at 06:01

## 2018-01-07 RX ADMIN — CHOLESTYRAMINE 4 G: 4 POWDER, FOR SUSPENSION ORAL at 08:01

## 2018-01-07 RX ADMIN — CHOLESTYRAMINE 4 G: 4 POWDER, FOR SUSPENSION ORAL at 09:01

## 2018-01-07 RX ADMIN — OXYCODONE AND ACETAMINOPHEN 1 TABLET: 5; 325 TABLET ORAL at 05:01

## 2018-01-07 RX ADMIN — HYDROMORPHONE HYDROCHLORIDE 0.5 MG: 2 INJECTION, SOLUTION INTRAMUSCULAR; INTRAVENOUS; SUBCUTANEOUS at 03:01

## 2018-01-07 RX ADMIN — GABAPENTIN 100 MG: 100 CAPSULE ORAL at 09:01

## 2018-01-07 RX ADMIN — METOPROLOL TARTRATE 50 MG: 50 TABLET ORAL at 09:01

## 2018-01-07 RX ADMIN — HYDROMORPHONE HYDROCHLORIDE 0.5 MG: 2 INJECTION, SOLUTION INTRAMUSCULAR; INTRAVENOUS; SUBCUTANEOUS at 11:01

## 2018-01-07 RX ADMIN — POTASSIUM CHLORIDE 40 MEQ: 20 TABLET, EXTENDED RELEASE ORAL at 09:01

## 2018-01-07 RX ADMIN — GABAPENTIN 100 MG: 100 CAPSULE ORAL at 08:01

## 2018-01-07 RX ADMIN — DIPHENOXYLATE HYDROCHLORIDE AND ATROPINE SULFATE 1 TABLET: 2.5; .025 TABLET ORAL at 09:01

## 2018-01-07 RX ADMIN — HYDRALAZINE HYDROCHLORIDE 10 MG: 20 INJECTION INTRAMUSCULAR; INTRAVENOUS at 06:01

## 2018-01-07 RX ADMIN — LOSARTAN POTASSIUM 50 MG: 50 TABLET, FILM COATED ORAL at 09:01

## 2018-01-07 RX ADMIN — CIPROFLOXACIN 400 MG: 2 INJECTION, SOLUTION INTRAVENOUS at 09:01

## 2018-01-07 RX ADMIN — METRONIDAZOLE 500 MG: 500 TABLET ORAL at 05:01

## 2018-01-07 RX ADMIN — METOPROLOL TARTRATE 50 MG: 50 TABLET ORAL at 08:01

## 2018-01-07 RX ADMIN — DIPHENOXYLATE HYDROCHLORIDE AND ATROPINE SULFATE 1 TABLET: 2.5; .025 TABLET ORAL at 07:01

## 2018-01-07 RX ADMIN — METRONIDAZOLE 500 MG: 500 TABLET ORAL at 08:01

## 2018-01-07 RX ADMIN — CIPROFLOXACIN 400 MG: 2 INJECTION, SOLUTION INTRAVENOUS at 08:01

## 2018-01-07 NOTE — PLAN OF CARE
Problem: Patient Care Overview  Goal: Plan of Care Review  Outcome: Ongoing (interventions implemented as appropriate)  Bed alarm in use to help prevent falls, patient remains free from falls. IV and PO antibiotics given for infection. Medicated for pain with IV pain medication, patient refused the pain pill. Medicated for diarrhea with Lomotil as ordered.

## 2018-01-07 NOTE — PROGRESS NOTES
"Found a Percocet tab given at 0555 and a [id number] mg tab given last PM in a cup at the bedside. I saw the patient take both pills when they were given. Patient said "those are the pills that fell on the floor." Wasted both in the Pyxis with Frances. Candy FLANAGAN crushed the pills and flushed them down the sink. Patient did not take the pills even though charted given on MAR.  "

## 2018-01-07 NOTE — PLAN OF CARE
Problem: Patient Care Overview  Goal: Plan of Care Review  Outcome: Ongoing (interventions implemented as appropriate)  Pt on RA spo2 99%.  No respiratory distress.  Will continue to monitor.

## 2018-01-07 NOTE — PROGRESS NOTES
Surgery Progress Note    NAEO.  Reports diarrhea improved w/ cholestyramine.  Pain improved from yesterday.  Tolerating regular diet w/ no N/V.  Voiding w/ no issues.  Pt afebrile over 24 hours.    Tmax: 98.2   HR: 64-94   RR: 16-18   Bp: 136-180/67-95   SpO2:     PE: General: AAO X 3;NAD         Abdomen: soft; NT/ND; incision c/d/i w/ no drainage or erythema    A/P: 68 y/o F w/ hx of metastatic NET s/p open cholecystectomy POD 8 w/ persistent diarrhea and abdominal pain  - diarrhea improved; continue cholestyramine  - advance to regular diet  - encourage ambulation; IS use   - possible d/c today if pain well controlled

## 2018-01-07 NOTE — PROGRESS NOTES
Notified Dr. Jones patient had a negative C. Diff Antigen and Toxin. Orders given to discontinue isolation.

## 2018-01-08 ENCOUNTER — TELEPHONE (OUTPATIENT)
Dept: NEUROLOGY | Facility: HOSPITAL | Age: 68
End: 2018-01-08

## 2018-01-08 VITALS
BODY MASS INDEX: 24.91 KG/M2 | OXYGEN SATURATION: 95 % | TEMPERATURE: 97 F | DIASTOLIC BLOOD PRESSURE: 74 MMHG | WEIGHT: 155 LBS | HEART RATE: 81 BPM | SYSTOLIC BLOOD PRESSURE: 165 MMHG | RESPIRATION RATE: 20 BRPM | HEIGHT: 66 IN

## 2018-01-08 LAB
ALBUMIN SERPL BCP-MCNC: 2.5 G/DL
ALP SERPL-CCNC: 77 U/L
ALT SERPL W/O P-5'-P-CCNC: 5 U/L
ANION GAP SERPL CALC-SCNC: 9 MMOL/L
AST SERPL-CCNC: 6 U/L
BASOPHILS # BLD AUTO: 0.02 K/UL
BASOPHILS NFR BLD: 0.3 %
BILIRUB SERPL-MCNC: 0.2 MG/DL
BUN SERPL-MCNC: 10 MG/DL
CALCIUM SERPL-MCNC: 8.4 MG/DL
CHLORIDE SERPL-SCNC: 110 MMOL/L
CO2 SERPL-SCNC: 22 MMOL/L
CREAT SERPL-MCNC: 0.9 MG/DL
DIFFERENTIAL METHOD: ABNORMAL
EOSINOPHIL # BLD AUTO: 0.4 K/UL
EOSINOPHIL NFR BLD: 4.5 %
ERYTHROCYTE [DISTWIDTH] IN BLOOD BY AUTOMATED COUNT: 14.9 %
EST. GFR  (AFRICAN AMERICAN): >60 ML/MIN/1.73 M^2
EST. GFR  (NON AFRICAN AMERICAN): >60 ML/MIN/1.73 M^2
GLUCOSE SERPL-MCNC: 180 MG/DL
HCT VFR BLD AUTO: 27.8 %
HGB BLD-MCNC: 8.8 G/DL
LYMPHOCYTES # BLD AUTO: 1.5 K/UL
LYMPHOCYTES NFR BLD: 19.4 %
MCH RBC QN AUTO: 27.5 PG
MCHC RBC AUTO-ENTMCNC: 31.7 G/DL
MCV RBC AUTO: 87 FL
MONOCYTES # BLD AUTO: 0.5 K/UL
MONOCYTES NFR BLD: 6.7 %
NEUTROPHILS # BLD AUTO: 5.3 K/UL
NEUTROPHILS NFR BLD: 68.1 %
PLATELET # BLD AUTO: 312 K/UL
PMV BLD AUTO: 9.3 FL
POTASSIUM SERPL-SCNC: 3.2 MMOL/L
PROT SERPL-MCNC: 5.8 G/DL
RBC # BLD AUTO: 3.2 M/UL
SODIUM SERPL-SCNC: 141 MMOL/L
WBC # BLD AUTO: 7.79 K/UL

## 2018-01-08 PROCEDURE — 25000003 PHARM REV CODE 250: Performed by: EMERGENCY MEDICINE

## 2018-01-08 PROCEDURE — 94761 N-INVAS EAR/PLS OXIMETRY MLT: CPT

## 2018-01-08 PROCEDURE — 63600175 PHARM REV CODE 636 W HCPCS: Performed by: STUDENT IN AN ORGANIZED HEALTH CARE EDUCATION/TRAINING PROGRAM

## 2018-01-08 PROCEDURE — 25000003 PHARM REV CODE 250: Performed by: STUDENT IN AN ORGANIZED HEALTH CARE EDUCATION/TRAINING PROGRAM

## 2018-01-08 PROCEDURE — 36415 COLL VENOUS BLD VENIPUNCTURE: CPT

## 2018-01-08 PROCEDURE — 85025 COMPLETE CBC W/AUTO DIFF WBC: CPT

## 2018-01-08 PROCEDURE — 80053 COMPREHEN METABOLIC PANEL: CPT

## 2018-01-08 PROCEDURE — 25000003 PHARM REV CODE 250: Performed by: SURGERY

## 2018-01-08 RX ORDER — CHOLESTYRAMINE 4 G/9G
1 POWDER, FOR SUSPENSION ORAL 2 TIMES DAILY
Qty: 60 PACKET | Refills: 0 | Status: SHIPPED | OUTPATIENT
Start: 2018-01-08 | End: 2018-01-18

## 2018-01-08 RX ADMIN — HYDROMORPHONE HYDROCHLORIDE 0.5 MG: 2 INJECTION, SOLUTION INTRAMUSCULAR; INTRAVENOUS; SUBCUTANEOUS at 02:01

## 2018-01-08 RX ADMIN — DIPHENOXYLATE HYDROCHLORIDE AND ATROPINE SULFATE 1 TABLET: 2.5; .025 TABLET ORAL at 02:01

## 2018-01-08 RX ADMIN — DIPHENOXYLATE HYDROCHLORIDE AND ATROPINE SULFATE 1 TABLET: 2.5; .025 TABLET ORAL at 08:01

## 2018-01-08 RX ADMIN — METOPROLOL TARTRATE 50 MG: 50 TABLET ORAL at 08:01

## 2018-01-08 RX ADMIN — HYDROMORPHONE HYDROCHLORIDE 0.5 MG: 2 INJECTION, SOLUTION INTRAMUSCULAR; INTRAVENOUS; SUBCUTANEOUS at 06:01

## 2018-01-08 RX ADMIN — GABAPENTIN 100 MG: 100 CAPSULE ORAL at 08:01

## 2018-01-08 RX ADMIN — METRONIDAZOLE 500 MG: 500 TABLET ORAL at 05:01

## 2018-01-08 RX ADMIN — METRONIDAZOLE 500 MG: 500 TABLET ORAL at 02:01

## 2018-01-08 RX ADMIN — HYDROMORPHONE HYDROCHLORIDE 0.5 MG: 2 INJECTION, SOLUTION INTRAMUSCULAR; INTRAVENOUS; SUBCUTANEOUS at 08:01

## 2018-01-08 RX ADMIN — HYDROMORPHONE HYDROCHLORIDE 0.5 MG: 2 INJECTION, SOLUTION INTRAMUSCULAR; INTRAVENOUS; SUBCUTANEOUS at 11:01

## 2018-01-08 RX ADMIN — ZOLPIDEM TARTRATE 5 MG: 5 TABLET, FILM COATED ORAL at 01:01

## 2018-01-08 RX ADMIN — LOSARTAN POTASSIUM 50 MG: 50 TABLET, FILM COATED ORAL at 09:01

## 2018-01-08 RX ADMIN — HYDROMORPHONE HYDROCHLORIDE 0.5 MG: 2 INJECTION, SOLUTION INTRAMUSCULAR; INTRAVENOUS; SUBCUTANEOUS at 05:01

## 2018-01-08 RX ADMIN — CHOLESTYRAMINE 4 G: 4 POWDER, FOR SUSPENSION ORAL at 09:01

## 2018-01-08 RX ADMIN — CIPROFLOXACIN 400 MG: 2 INJECTION, SOLUTION INTRAVENOUS at 08:01

## 2018-01-08 NOTE — TELEPHONE ENCOUNTER
----- Message from Katalina Gaytan sent at 1/8/2018  3:05 PM CST -----  Contact: Patient  BRIAN- Patient called to see if Dr Perry can prescribe a smaller pain pill. She is having problems taking the ones she is on. Call back number [phone]

## 2018-01-08 NOTE — TELEPHONE ENCOUNTER
"Returned pts call. Pt says percocets are "too big" and hard to swallow. Patient states the only pain meds that work for her and are easy to swallow are "oxycodone, 30 mg." advised pt that our physicians do not typically prescribe optiates this strong 1 week after surgery. Pt states tramadol doesn't work and she will not take it. Discussed with Dr. Perez, who states he will prescribe Tramadol for her, and anything stronger she will need to see a pain management specialist. Pt notified and is argumentative, stating she would at least like percocet like she was given when she was discharged the last time on 1/2. Pt was given #30 pills upon discharge, and was readmitted 2 days later. Asked pt if she had any of those left, she states, "only a few". Advised pt once more that Dr. Perez will only prescribe tramadol and anything stronger will need to come from pain management.   "

## 2018-01-08 NOTE — TELEPHONE ENCOUNTER
Patient is still admitted in the hospital after surgery, informed patient of need for dietary change. Patient verbalized understanding.

## 2018-01-08 NOTE — DISCHARGE SUMMARY
"Discharge Summary      Admit Date: 1/4/2018    Discharge Date and Time: 01/08/2018    Attending Physician: ENA Sánchez MD     Discharge Physician: Bennie Sánchez MD    Principal Diagnoses: Colitis  The primary encounter diagnosis was Colitis. A diagnosis of Intractable pain was also pertinent to this visit.    Discharged Condition: stable    Hospital Course: 67yF POD#6 ope leslie, hx met NET returned to ER with non bloody diarrhea. Reported 5-6 BM daily. Was able to eat and keep liquids and food down, however continued to have diarrhea. Reported BM were soft, still formed, not foul smelling. abd pain @ incision site otherwise no abd pain. Was eating eggs, biscuits, "regular" food at home. BM worse after meals. Patient also was on abx therapy for UTI from last admission. Denied fevers/chills. No other sick contacts. No new foods. Was voiding well, urine clear. Denied dysuria or hematuria.    Pt was admitted to floor and made NPO, given IVF's. C diff was negative. HIDA scan negative for bile leak. CT abdomen without contrast findings are below. Suspected bile salt diarrhea, treated with cholestyramine, diarrhea improved. Remained afebrile for >24 hrs. WBC normal. Abdominal pain was improved. Will need oupt f/u with Dr. Perez (with tumor markers), outpatient MRI of her liver to fully assess liver function/METs, and oupt gallium scan (reassess MET's).     Diet: regular   Activity: as tolerated , lifting restrictions 10 lbs   Call Clinic/MD or return to ED if: any persistent nausea/emesis, fevers > 101.5, increased abdominal pain, redness or drainage from incision or any other concerns     Consults: None    Significant Diagnostic Studies:    Results for orders placed or performed during the hospital encounter of 01/04/18   CT Abdomen Pelvis  Without Contrast    Narrative    Clinical indication: 67-year-old female with abdominal pain.    Comparison: CT abdomen and pelvis 12/29/17, MRI MRCP and HIDA scan " 12/30/17.    Technique: Transaxial images were obtained through the abdomen and pelvis in 5 mm increments without the use of p.o. or IV contrast.    Findings:  The visualized portion of the heart is unremarkable.  The lung bases are clear.    Interval postsurgical changes are visualized consistent with cholecystectomy.  There is ill-defined 5.8 cm air and fluid collection seen within the gallbladder fossa which could represent potential developing abscess.  Surgical clips are seen in the region.  Few scattered foci of intraperitoneal air are seen, not unexpected in recent postoperative setting.    Known hepatic metastatic lesions are not well characterized on this noncontrast exam.  No evidence of biliary ductal dilatation.  Stomach, spleen, pancreas, and adrenal glands are unremarkable.    Postsurgical changes are visualized consistent with partial colon resection.  No evidence of bowel obstruction.    Bilateral nonobstructing renal stones are seen.  No evidence of hydronephrosis.  Ureters are unremarkable along their course.  Small focus of air is seen within the urinary bladder likely relating to recent catheterization.  Uterus has been removed      No acute osseous abnormality identified.  Postsurgical changes and stranding are seen over the right upper quadrant.    Impression    1.  Interval postsurgical changes consistent with cholecystectomy.  Ill-defined air and fluid collection is seen within the gallbladder fossa which could reflect developing abscess.    2.  Few scattered foci of intraperitoneal air are seen, not unexpected in recent postoperative setting, however viscus perforation cannot be entirely excluded.    3.  Known hepatic metastasis incompletely characterized on this noncontrast exam.    4.  Bilateral nonobstructing renal stones.    5.  Additional findings as above.      Electronically signed by: VINAY ALVAREZ MD  Date:     01/04/18  Time:    20:03      Nuclear Medicine: Cholecystectomy status  with no abnormal accumulation of radiotracer to suggest a bile leak.    Treatments: NPO, IVF's pain meds, Abx, cholestyramine     Disposition: Home or Self Care    Patient Instructions:   Current Discharge Medication List      CONTINUE these medications which have NOT CHANGED    Details   cyanocobalamin 1,000 mcg/mL injection 1,000 mcg every 30 days.   Refills: 0      docusate sodium (COLACE) 100 MG capsule Take 100 mg by mouth 2 (two) times daily as needed.       ergocalciferol (VITAMIN D2) 50,000 unit Cap Take 1 capsule (50,000 Units total) by mouth every 7 days.  Qty: 12 capsule, Refills: 4      ferrous gluconate (FERGON) 324 MG tablet TAKE 1 TABLET EVERY DAY WITH BREAKFAST  Qty: 30 tablet, Refills: 2    Associated Diagnoses: Iron deficiency anemia, unspecified iron deficiency anemia type      gabapentin (NEURONTIN) 100 MG capsule Take 100 mg by mouth 2 (two) times daily.      LOPERAMIDE HCL (IMODIUM A-D ORAL) Take by mouth daily as needed.       losartan (COZAAR) 50 MG tablet once daily.       magnesium oxide (MAG-OX) 250 mg Tab Take by mouth once daily.       metoprolol tartrate (LOPRESSOR) 50 MG tablet Take 50 mg by mouth 2 (two) times daily.      nitrofurantoin (MACRODANTIN) 50 MG capsule Take 1 capsule (50 mg total) by mouth every 6 (six) hours.  Qty: 28 capsule, Refills: 0      nystatin (MYCOSTATIN) 100,000 unit/mL suspension Take 6 mLs (600,000 Units total) by mouth 4 (four) times daily as needed.  Qty: 60 mL, Refills: 0      ondansetron (ZOFRAN) 8 MG tablet Take by mouth every 8 (eight) hours as needed for Nausea.      oxycodone (ROXICODONE) 30 MG Tab       oxyCODONE-acetaminophen (PERCOCET) 5-325 mg per tablet Take 1 tablet by mouth every 6 (six) hours as needed for Pain.  Qty: 30 tablet, Refills: 0      polyethylene glycol (GLYCOLAX) 17 gram/dose powder       tramadol (ULTRAM) 50 mg tablet Take 50 mg by mouth every 6 (six) hours as needed for Pain.      trazodone (DESYREL) 50 MG tablet Take 50 mg by  mouth nightly as needed.       triamcinolone acetonide 0.1% (KENALOG) 0.1 % cream Apply topically 2 (two) times daily.      XIIDRA 5 % Dpet       zolpidem (AMBIEN) 5 MG Tab Take 1 tablet (5 mg total) by mouth nightly as needed.  Qty: 10 tablet, Refills: 0    Associated Diagnoses: Insomnia, unspecified type           > 30 mins spent on discharge summary    No discharge procedures on file.    Ervin Pérez  01/08/2018  12:03 PM

## 2018-01-08 NOTE — PROGRESS NOTES
Follow-Up       Follow-up With  Details  Why  Contact Info   Magdalena Hernandez MD  On 1/23/2018  9:00 am previously booked   [address]  [address]  [place] [address]  [phone]   Chris Herbert MD  On 1/15/2018  10:00 am previously booked for hospital follow up  [address]  [address]  [place] [address]  [phone]

## 2018-01-08 NOTE — PROGRESS NOTES
Surgery Progress Note    NAEO.  Reports intermittent diarrhea with nausea. Pain improving. Tolerating regular diet w/ no vomiting.  Voiding w/ no issues.  Pt afebrile over 24 hours.    Tmax: 99.4   HR:    RR: 16-22   Bp: 138-174/63-74   SpO2: 98-99% on RA    PE: General: AAO X 3;NAD         Abdomen: soft; NT/ND; incision c/d/i w/ no drainage or erythema    A/P: 66 y/o F w/ hx of metastatic NET s/p open cholecystectomy POD 9 w/ persistent diarrhea and abdominal pain  - diarrhea improved; continue cholestyramine  - continue regular diet  - encourage ambulation; IS use   - possible d/c today if pain well controlled

## 2018-01-09 NOTE — PLAN OF CARE
TN met with pt prior to d/c    pt will recuperate with sierra Salcedo --  [phone]   [address] [place] [address] MAYUR Arcos     Future Appointments  Date Time Provider Department Center   1/15/2018 10:00 AM Chris Herbert MD [facility]   1/23/2018 9:00 AM Magdalena Hernandez MD [facility]        01/08/18 1841   Final Note   Assessment Type Final Discharge Note   Discharge Disposition Home   What phone number can be called within the next 1-3 days to see how you are doing after discharge? [phone]   Hospital Follow Up  Appt(s) scheduled? Yes   Discharge plans and expectations educations in teach back method with documentation complete? Yes   Right Care Referral Info   Post Acute Recommendation Home-care   Referral Type (no care )

## 2018-01-09 NOTE — PROGRESS NOTES
Patient discharged per MD orders. Patient verbalized understanding of discharge instructions. PIV discontinued per MD orders. Cathter tip intact and pressure dressing applied. Telemetry monitor discontinued. Patient wheeled to main lobby

## 2018-01-10 ENCOUNTER — PATIENT OUTREACH (OUTPATIENT)
Dept: ADMINISTRATIVE | Facility: CLINIC | Age: 68
End: 2018-01-10

## 2018-01-10 NOTE — PHYSICIAN QUERY
PT Name: Patito Domingo  MR #: [id number]     Physician Query Form - Documentation Clarification      CDS/: Amy Monroe RN                                               Contact information: [phone]    This form is a permanent document in the medical record.     Query Date: January 10, 2018    By submitting this query, we are merely seeking further clarification of documentation. Please utilize your independent clinical judgment when addressing the question(s) below.    The Medical record reflects the following:    Supporting Clinical Findings Location in Medical Record     Phosphorus 2.0-2.2 12/31-1/2        Labs     K Phos 30mmol in 500cc D5 IV x 1 12/31  K Phos 30mmol in 500cc D5 IV x 1 1/2  K Phos 500mg po x 1 1/1     MAR                                                                            Doctor, Please specify diagnosis or diagnoses associated with above clinical findings.    Provider Use Only         (xxx ) Hypophosphatemia      ( ) Other: (specify):________________________                                                                                                             [  ] Clinically undetermined

## 2018-01-10 NOTE — PATIENT INSTRUCTIONS
Noninfectious Gastroenteritis (Ages 6 Years to Adult)    Gastroenteritis can cause nausea, vomiting, diarrhea, and abdominal cramping. This may occur as a result of food sensitivity, inflammation of your gastrointestinal tract, medicines, stress, or other causes not related to infection. Your symptoms will usually last from 1 to 3 days, but can last longer. Antibiotics are not effective, but simple home treatment will be helpful.  Home care  Medicine  · You may use acetaminophen or NSAID medicines like ibuprofen or naproxen to control fever, unless another medicine is prescribed. (Note: If you have chronic liver or kidney disease, or ever had a stomach ulcer or gastrointestinalI bleeding, talk with your healthcare provider before using these medicines.) Aspirin should never be used in anyone under 18 years of age who is ill with a fever. It may cause severe liver damage. Don't increase your NSAID medicines if you are already taking these medicines for another condition (like arthritis). Don't use NSAIDS if you are on aspirin (such as for heart disease, or after a stroke).  · If medicines for diarrhea or vomiting are prescribed, take only as directed.  General care and preventing spread of the illness  · If symptoms are severe, rest at home for the next 24 hours or until you feel better.  · Hand washing with soap and water is the best way to prevent the spread of infection. Wash your hands after touching anyone who is sick.  · Wash your hands after using the toilet and before meals. Clean the toilet after each use.  · Caffeine, tobacco, and alcohol can make your diarrhea, cramping, and pain worse.  Diet  · Water and clear liquids are important so you do not get dehydrated. Drink a small amount at a time.  · Do not force yourself to eat, especially if you have cramps, vomiting, or diarrhea. When you finally decide to start eating, do not eat large amounts at a time, even if you are hungry.  · If you eat, avoid  fatty, greasy, spicy, or fried foods.  · Do not eat dairy products if you have diarrhea; they can make the diarrhea worse.  During the first 24 hours (the first full day), follow the diet below:  · Beverages: Water, clear liquids, soft drinks without caffeine, like ginger ale; mineral water (plain or flavored); decaffeinated tea and coffee.  · Soups: Clear broth, consommé, and bouillon Sports drinks aren't a good choice because they have too much sugar and not enough electrolytes. In this case, commercially available products called oral rehydration solutions are best.  · Desserts: Plain gelatin, popsicles, and fruit juice bars.  During the next 24 hours (the second day), you may add the following to the above if you have improved. If not, continue what you did the first day:  · Hot cereal, plain toast, bread, rolls, crackers  · Plain noodles, rice, mashed potatoes, chicken noodle or rice soup  · Unsweetened canned fruit (avoid pineapple), bananas  · Limit caffeine and chocolate. No spices or seasonings except salt.  During the next 24 hours  · Gradually resume a normal diet, as you feel better and your symptoms improve.  · If at any time your symptoms start getting worse, go back to clear liquids until you feel better.  Food preparation  · If you have diarrhea, you should not prepare food for others. When you  prepare food for yourself, wash your hands before and after.  · Wash your hands after using cutting boards, countertops, and knives that have been in contact with raw food.  · Keep uncooked meats away from cooked and ready-to-eat foods.  Follow-up care  Follow up with your healthcare provider if you are not improving over the next 2 to 3 days, or as advised. If a stool (diarrhea) sample was taken, call for the results as directed.  When to seek medical care  Call your healthcare provider right away if any of these occur:   · Increasing abdominal pain or constant lower right abdominal pain  · Continued  vomiting (unable to keep liquids down)  · Frequent diarrhea (more than 5 times a day)  · Blood in vomit or stool (black or red color)  · Inability to tolerate solid food after a few days.  · Dark urine, reduced urine output  · Weakness, dizziness  · Drowsiness  · Fever of 100.4ºF (38.0ºC) or higher, or as directed by your healthcare provider  · New rash  Call 911  Call 911 if any of these occur:  · Trouble breathing  · Chest pain  · Confusion  · Severe drowsiness or trouble awakening  · Seizure  · Stiff neck  Date Last Reviewed: 11/16/2015  © 4840-5009 [employer]. [address] [place] [address], [place] [address]. All rights reserved. This information is not intended as a substitute for professional medical care. Always follow your healthcare professional's instructions.

## 2018-01-10 NOTE — PHYSICIAN QUERY
PT Name: Patito Domingo  MR #: [id number]     Physician Query Form - Documentation Clarification      CDS/: Amy Monroe                                                                    Contact information: [phone]    This form is a permanent document in the medical record.     Query Date: January 10, 2018    By submitting this query, we are merely seeking further clarification of documentation. Please utilize your independent clinical judgment when addressing the question(s) below.    The Medical record reflects the following:    Supporting Clinical Findings Location in Medical Record   -Invanz started for multi drug resistant E. Coli in urine, will continue to monitor for fevers, increasing WBC count      Patient had a urine culture positive for E. Coli, which she was treated for while inpatient. She will also be discharged on 7 days of nitrofurantoin to complete treatment.     PN 1/1 and 1/2          Discharge Summary      Past Medical History: Acute Pyelonephritis       Anesthesia note 12/30   ESCHERICHIA COLI   >100,000 cfu/ml    Specimen Source: Urine, Catheterized     Collected: 12/29/2017  3:05 AM          Lab                                                                            Doctor, Please specify diagnosis or diagnoses associated with above clinical findings.    Provider Use Only       (xxx )   UTI due to E Coli; POA= yes (present on admit)     ( )  UTI due to organism:______________________                          POA=  ( ) Yes; ( ) No; ( ) unable to determine     ( )  Other diagnosis: ________________________                                                                                                                                [  ] Clinically undetermined

## 2018-01-10 NOTE — PHYSICIAN QUERY
"PT Name: Patito Domingo  MR #: [id number]    Physician Query Form - Hematology Clarification      CDS/: Amy Monroe RN                                                             Contact information: [phone]    This form is a permanent document in the medical record.      Query Date: January 10, 2018    By submitting this query, we are merely seeking further clarification of documentation. Please utilize your independent clinical judgment when addressing the question(s) below.    The Medical record contains the following:   Indicators  Supporting Clinical Findings Location in Medical Record   x "Anemia" documented ferrous gluconate (FERGON) 324 MG tablet TAKE 1 TABLET EVERY DAY WITH BREAKFAST  Qty: 30 tablet, Refills: 2     Associated Diagnoses: Iron deficiency anemia, unspecified iron deficiency anemia type      Discharge Summary   x H & H = 8.6/27.6 - 10.8/34  12/29-1/2 Labs    BP =                     HR=      "GI bleeding" documented      Acute bleeding (Non GI site)      Transfusion(s)      Treatment:     x Other:  Patient has a past medical history significant for metastatic neuroendocrine tumor.   Significant Diagnostic Studies: Imaging showed metastatic neuroendocrine tumor of the liver, worse since previous scans.   Discharge Summary     Provider, please specify diagnosis or diagnoses associated with above clinical findings.                      Bert all that apply:      [xxx  ] Iron deficiency anemia    [  ] Anemia of chronic disease-- Neoplastic disease     [  ] Other Hematological Diagnosis (please specify): _________________________________    [  ] Clinically Undetermined       Please document in your progress notes daily for the duration of treatment, until resolved, and include in your discharge summary.                                                                                                      "

## 2018-01-10 NOTE — PHYSICIAN QUERY
PT Name: Patito Domingo  MR #: [id number]     Physician Query Form - Documentation Clarification      CDS/: Amy Monroe RN                                                   Contact information: [phone]  This form is a permanent document in the medical record.     Query Date: January 10, 2018    By submitting this query, we are merely seeking further clarification of documentation. Please utilize your independent clinical judgment when addressing the question(s) below.    The Medical record reflects the following:    Supporting Clinical Findings Location in Medical Record     3.1-3.4 12/29-12/31       Labs     D5 1/2 with 20meq KCL @ 50cc/hr 12-30-1/1  K Phos 30mmol/137lsG0 IV x 1 12/31   K Phos 30mmol/920idK6 IV x 1 1/2  K Phos 500mg tab po once on 1/1   MAR                                                                            Doctor, Please specify diagnosis or diagnoses associated with above clinical findings.    Provider Use Only       (xx ) Hypokalemia     ( ) Other: (specify):__________________                                                                                                           [  ] Clinically undetermined

## 2018-01-10 NOTE — PHYSICIAN QUERY
PT Name: Patito Domingo  MR #: [id number]     Physician Query Form - Documentation Clarification      CDS/: Amy Monroe RN                                             Contact information: [phone]  This form is a permanent document in the medical record.     Query Date: January 10, 2018    By submitting this query, we are merely seeking further clarification of documentation. Please utilize your independent clinical judgment when addressing the question(s) below.    The Medical record reflects the following:    Supporting Clinical Findings Location in Medical Record     Magnesium 1.2-1.5 12/30-1/2       Labs     Mag Oxide 400mg po x 2 on 1/1     MAR                                                                            Doctor, Please specify diagnosis or diagnoses associated with above clinical findings.    Provider Use Only       (xxx ) Hypomagnesemia     ( ) Other (specify):______________________                                                                                                             [  ] Clinically undetermined

## 2018-01-12 RX ORDER — ONDANSETRON 8 MG/1
8 TABLET, ORALLY DISINTEGRATING ORAL EVERY 12 HOURS PRN
Qty: 30 TABLET | Refills: 0 | Status: SHIPPED | OUTPATIENT
Start: 2018-01-12 | End: 2018-01-18

## 2018-01-12 NOTE — TELEPHONE ENCOUNTER
----- Message from Angela Cuba sent at 1/11/2018 10:45 AM CST -----  Contact: Patient  BRIAN- Patient [phone]. Patient can be reached at [phone].

## 2018-01-12 NOTE — TELEPHONE ENCOUNTER
----- Message from Angela Cuba sent at 1/11/2018  3:57 PM CST -----  Contact: Patient  BRIAN- Patient is calling back again stating she is very nauseated and needs something called into [facility] in [facility].  ----- Message -----  From: Angela Cuba  Sent: 1/11/2018  10:45 AM  To: Chris Herbert Staff    BRIAN- Patient needs something for nausea and vomiting called into [facility] in [facility] [phone]. Patient can be reached at [phone].

## 2018-01-18 ENCOUNTER — INFUSION (OUTPATIENT)
Dept: INFUSION THERAPY | Facility: HOSPITAL | Age: 68
End: 2018-01-18
Attending: SURGERY
Payer: MEDICARE

## 2018-01-18 ENCOUNTER — OFFICE VISIT (OUTPATIENT)
Dept: NEUROLOGY | Facility: HOSPITAL | Age: 68
End: 2018-01-18
Attending: SURGERY
Payer: MEDICARE

## 2018-01-18 VITALS
SYSTOLIC BLOOD PRESSURE: 137 MMHG | WEIGHT: 146.25 LBS | TEMPERATURE: 99 F | BODY MASS INDEX: 23.5 KG/M2 | HEIGHT: 66 IN | BODY MASS INDEX: 23.5 KG/M2 | WEIGHT: 146.25 LBS | DIASTOLIC BLOOD PRESSURE: 60 MMHG | HEART RATE: 92 BPM | HEIGHT: 66 IN

## 2018-01-18 DIAGNOSIS — C78.7 SECONDARY MALIGNANT NEOPLASM OF LIVER: ICD-10-CM

## 2018-01-18 DIAGNOSIS — C7B.8 SECONDARY NEUROENDOCRINE TUMOR OF LIVER: ICD-10-CM

## 2018-01-18 DIAGNOSIS — C7B.8 SECONDARY NEUROENDOCRINE TUMOR OF DISTANT LYMPH NODES: Primary | ICD-10-CM

## 2018-01-18 DIAGNOSIS — Z09 POSTOP CHECK: Primary | ICD-10-CM

## 2018-01-18 DIAGNOSIS — C7A.8 NEUROENDOCRINE CARCINOMA, UNKNOWN PRIMARY SITE: ICD-10-CM

## 2018-01-18 PROCEDURE — 63600175 PHARM REV CODE 636 W HCPCS: Mod: JG | Performed by: SURGERY

## 2018-01-18 PROCEDURE — 96372 THER/PROPH/DIAG INJ SC/IM: CPT

## 2018-01-18 PROCEDURE — 99214 OFFICE O/P EST MOD 30 MIN: CPT | Performed by: SURGERY

## 2018-01-18 RX ORDER — LANREOTIDE ACETATE 120 MG/.5ML
120 INJECTION SUBCUTANEOUS
Status: CANCELLED
Start: 2018-01-28

## 2018-01-18 RX ORDER — LANREOTIDE ACETATE 120 MG/.5ML
120 INJECTION SUBCUTANEOUS
COMMUNITY
End: 2018-09-24

## 2018-01-18 RX ORDER — LANREOTIDE ACETATE 120 MG/.5ML
120 INJECTION SUBCUTANEOUS
Status: DISCONTINUED | OUTPATIENT
Start: 2018-01-18 | End: 2018-01-18 | Stop reason: HOSPADM

## 2018-01-18 RX ORDER — DIPHENOXYLATE HYDROCHLORIDE AND ATROPINE SULFATE 2.5; .025 MG/1; MG/1
1 TABLET ORAL 4 TIMES DAILY PRN
Qty: 60 TABLET | Refills: 1 | Status: SHIPPED | OUTPATIENT
Start: 2018-01-18 | End: 2018-01-28

## 2018-01-18 RX ORDER — PROMETHAZINE HYDROCHLORIDE 25 MG/1
25 TABLET ORAL EVERY 6 HOURS PRN
Qty: 25 TABLET | Refills: 1 | Status: SHIPPED | OUTPATIENT
Start: 2018-01-18 | End: 2018-03-09 | Stop reason: ALTCHOICE

## 2018-01-18 RX ADMIN — LANREOTIDE ACETATE 120 MG: 120 INJECTION SUBCUTANEOUS at 02:01

## 2018-01-18 NOTE — PATIENT INSTRUCTIONS
Labs at [facility], [address] on 3rd floor   Gallium scan at [facility] 2/14/18 at 11 am   CT and MRI scheduled -- can call [phone] if need to reschedule   Call office after scans are complete  We will discuss you in tumor board once scans completed

## 2018-01-18 NOTE — PROGRESS NOTES
Still having lot of diarrhea 4-5 times  cholestyramine made it worse  Takes about 4 imodium a day    Able to sleep   Able to ambulate around home    abd stable  Wound looks good    Will need to start laNREOTIDE   WILL GIVE HER PRECRIPTIN FOR LOMOTIL    Will obtain CT/ MRI/ Gallium and carcinoid labs and discuss at tumour board

## 2018-01-19 ENCOUNTER — TELEPHONE (OUTPATIENT)
Dept: FAMILY MEDICINE | Facility: CLINIC | Age: 68
End: 2018-01-19

## 2018-01-19 NOTE — TELEPHONE ENCOUNTER
----- Message from Lilibeth Flowers sent at 1/19/2018  9:08 AM CST -----  No. [phone]   Patient cancelled her follow up appointment on 1/23/18.   She would like to reschedule for Friday, 1/26/18.   Please call.

## 2018-01-22 ENCOUNTER — CONFERENCE (OUTPATIENT)
Dept: NEUROLOGY | Facility: HOSPITAL | Age: 68
End: 2018-01-22

## 2018-01-22 NOTE — TELEPHONE ENCOUNTER
[facility]      NEUROENDOCRINE TUMOR MULTIDISCIPLINARY TUMOR BOARD  _____________________________________________________________________    PRESENTER:   SASHA Herbert MD    REASON FOR PRESENTATION:  Treatment Plan and Scan Review    ATTENDEES:   Surgery:              MD ENA Damico MD T. 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 Her CT from 5/2017 showed increasing number of liver lesions. No apparent extra hepatic disease. She had a cholecystectomy in December. Rec discussing after scans. She is a candidate for LDT (TACE vs Y90).      BOARD RECOMMENDATIONS:     Needs tumor markers now  Surgical evaluation after scans in February   Consider systemic therapy if she does not want surgery(afinitor)  Start on [place]  Appointment with Dr Perkins  Consider afinitor   Consider LDT

## 2018-01-31 ENCOUNTER — TELEPHONE (OUTPATIENT)
Dept: NEUROLOGY | Facility: HOSPITAL | Age: 68
End: 2018-01-31

## 2018-01-31 NOTE — TELEPHONE ENCOUNTER
Phoned patient to discuss tumor board recommendations.  Patient did not have tumor markers drawn like we asked her to do on January 18 after her clinic appointment with Dr Perry.  Her CT and MRI scheduled for 1/25/18 she was a no show.  Scheduled the gallium for 2/14/18 at 11 AM at [facility] per Yolette's AVS summary, faxed orders to [facility] markers to be done and gave her the number to call and schedule her CT and MRI again.  She was started on lanreotide on 1/18/18.  After the gallium scan is done we will make her an appointment to see Dr Perez for a surgical evaluation.

## 2018-01-31 NOTE — TELEPHONE ENCOUNTER
----- Message from Angela Cuba sent at 1/31/2018  3:26 PM CST -----  Contact: Patient  BRIAN- Patient has questions about her tests. Call back number is [phone]

## 2018-01-31 NOTE — TELEPHONE ENCOUNTER
"Pt given number to call to reschedule CT and MRI which missed on last week.  Pt given [phone]. Pt states "no one told me I had an appt".  "

## 2018-02-07 ENCOUNTER — OFFICE VISIT (OUTPATIENT)
Dept: FAMILY MEDICINE | Facility: CLINIC | Age: 68
End: 2018-02-07
Payer: MEDICARE

## 2018-02-07 VITALS
DIASTOLIC BLOOD PRESSURE: 68 MMHG | OXYGEN SATURATION: 99 % | RESPIRATION RATE: 18 BRPM | BODY MASS INDEX: 24.59 KG/M2 | TEMPERATURE: 99 F | HEIGHT: 66 IN | SYSTOLIC BLOOD PRESSURE: 146 MMHG | HEART RATE: 55 BPM | WEIGHT: 153 LBS

## 2018-02-07 DIAGNOSIS — C7B.8 SECONDARY NEUROENDOCRINE TUMOR OF LIVER: Primary | ICD-10-CM

## 2018-02-07 DIAGNOSIS — B37.0 THRUSH: ICD-10-CM

## 2018-02-07 DIAGNOSIS — I10 ESSENTIAL HYPERTENSION: ICD-10-CM

## 2018-02-07 PROCEDURE — 99999 PR PBB SHADOW E&M-EST. PATIENT-LVL IV: CPT | Mod: PBBFAC,,, | Performed by: FAMILY MEDICINE

## 2018-02-07 PROCEDURE — 99214 OFFICE O/P EST MOD 30 MIN: CPT | Mod: ,,, | Performed by: FAMILY MEDICINE

## 2018-02-07 PROCEDURE — 99214 OFFICE O/P EST MOD 30 MIN: CPT | Mod: PBBFAC,PN | Performed by: FAMILY MEDICINE

## 2018-02-07 RX ORDER — PROMETHAZINE HYDROCHLORIDE 25 MG/1
TABLET ORAL
COMMUNITY
End: 2018-02-07

## 2018-02-07 RX ORDER — ATENOLOL AND CHLORTHALIDONE TABLET 50; 25 MG/1; MG/1
TABLET ORAL
COMMUNITY
End: 2018-03-09

## 2018-02-07 RX ORDER — OXYCODONE HYDROCHLORIDE 30 MG/1
30 TABLET ORAL EVERY 4 HOURS PRN
Status: ON HOLD | COMMUNITY
Start: 2018-01-25 | End: 2018-12-04 | Stop reason: HOSPADM

## 2018-02-07 RX ORDER — PREDNISOLONE ACETATE 10 MG/ML
SUSPENSION/ DROPS OPHTHALMIC
Status: ON HOLD | COMMUNITY
End: 2018-04-27

## 2018-02-07 RX ORDER — NYSTATIN 100000 [USP'U]/ML
SUSPENSION ORAL
COMMUNITY
End: 2018-09-19 | Stop reason: SDUPTHER

## 2018-02-07 RX ORDER — DIPHENOXYLATE HYDROCHLORIDE AND ATROPINE SULFATE 2.5; .025 MG/1; MG/1
1 TABLET ORAL 4 TIMES DAILY PRN
COMMUNITY
End: 2018-04-10 | Stop reason: SDUPTHER

## 2018-02-07 RX ORDER — GABAPENTIN 100 MG/1
CAPSULE ORAL
COMMUNITY
End: 2018-02-07

## 2018-02-07 RX ORDER — METOPROLOL TARTRATE 50 MG/1
TABLET ORAL
COMMUNITY
End: 2018-02-07

## 2018-02-07 RX ORDER — LOSARTAN POTASSIUM 50 MG/1
TABLET ORAL
COMMUNITY
End: 2018-02-07

## 2018-02-07 RX ORDER — ERGOCALCIFEROL 1.25 MG/1
CAPSULE ORAL
COMMUNITY
End: 2018-02-07

## 2018-02-07 RX ORDER — NYSTATIN 100000 [USP'U]/ML
6 SUSPENSION ORAL 4 TIMES DAILY
Qty: 473 ML | Refills: 1 | Status: ON HOLD | OUTPATIENT
Start: 2018-02-07 | End: 2018-02-20 | Stop reason: HOSPADM

## 2018-02-07 NOTE — PROGRESS NOTES
HPI:  Patito Domingo is a 67 y.o. year old female that presents for f/u. She continues to have trouble with diarrhea even though she is staying away from fatty foods following her removal of her gall bladder. She is using a walker. She has not gotten her cane as of yet.   She is concerned that she will get thrush on her tongue once she goes back for treatment of her liver cancer.  Chief Complaint   Patient presents with    Follow-up     pt had gallbladder surgery on 12/30--pt states has been having diarrhea since surgery   .     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)      Social History     Social History    Marital status:      Spouse name: N/A    Number of children: N/A    Years of education: N/A     Occupational History    disabled       Social History Main Topics    Smoking status: Never Smoker    Smokeless tobacco: Never Used    Alcohol use No    Drug use: No    Sexual activity: Not on file     Other Topics Concern    Not on file     Social History Narrative    No narrative on file     Past Surgical History:   Procedure Laterality Date    CATARACT EXTRACTION Left 10/2017    COLON SURGERY      cystoscope      EYE SURGERY      HYSTERECTOMY  5/1996    LITHOTRIPSY      LIVER BIOPSY  9/14    carcinoid     Family History   Problem Relation Age of Onset    Cancer Mother      unknown    Alzheimer's disease Father     Stroke Sister     No Known Problems Son     No Known Problems Son     No Known Problems Son     No Known Problems Son            Review of Systems  General ROS: negative for chills, fever or weight loss  ENT ROS: negative for epistaxis, sore throat or rhinorrhea  Respiratory ROS: no cough, shortness of breath, or wheezing  Cardiovascular ROS:  "no chest pain or dyspnea on exertion  Gastrointestinal ROS: no abdominal pain, change in bowel habits, or black/ bloody stools    Physical Exam:  BP (!) 146/68 (BP Location: Right arm, Patient Position: Sitting, BP Method: Medium (Manual))   Pulse (!) 55   Temp 98.7 °F (37.1 °C) (Oral)   Resp 18   Ht 5' 6" (1.676 m)   Wt 69.4 kg (153 lb)   SpO2 99%   BMI 24.69 kg/m²   General appearance: alert, cooperative, mild distress, appears weak  Constitutional:Oriented to person, place, and time.appears well-developed and well-nourished.  HEENT: Normocephalic, atraumatic, neck symmetrical, no nasal discharge, TM- clear bilaterally  Lungs: clear to auscultation bilaterally, no dullness to percussion bilaterally  Heart: regular rate and rhythm without rub; no displacement of the PMI , S1&S2 present  Abdomen: soft, non-tender; bowel sounds normoactive; no organomegaly  Physical Exam    LABS:    Complete Blood Count  Lab Results   Component Value Date    RBC 3.20 (L) 01/08/2018    HGB 8.8 (L) 01/08/2018    HCT 27.8 (L) 01/08/2018    MCV 87 01/08/2018    MCH 27.5 01/08/2018    MCHC 31.7 (L) 01/08/2018    RDW 14.9 (H) 01/08/2018     01/08/2018    MPV 9.3 01/08/2018    GRAN 5.3 01/08/2018    GRAN 68.1 01/08/2018    LYMPH 1.5 01/08/2018    LYMPH 19.4 01/08/2018    MONO 0.5 01/08/2018    MONO 6.7 01/08/2018    EOS 0.4 01/08/2018    BASO 0.02 01/08/2018    EOSINOPHIL 4.5 01/08/2018    BASOPHIL 0.3 01/08/2018    DIFFMETHOD Automated 01/08/2018       Comprehensive Metabolic Panel  Lab Results   Component Value Date     (H) 01/08/2018    BUN 10 01/08/2018    CREATININE 0.9 01/08/2018     01/08/2018    K 3.2 (L) 01/08/2018     01/08/2018    PROT 5.8 (L) 01/08/2018    ALBUMIN 2.5 (L) 01/08/2018    BILITOT 0.2 01/08/2018    AST 6 (L) 01/08/2018    ALKPHOS 77 01/08/2018    CO2 22 (L) 01/08/2018    ALT 5 (L) 01/08/2018    ANIONGAP 9 01/08/2018    EGFRNONAA >60 01/08/2018    ESTGFRAFRICA >60 01/08/2018 "       LIPID  Lab Results   Component Value Date    CHOL 143 08/30/2011    HDL 59 08/30/2011         TSH  Lab Results   Component Value Date    TSH 0.896 03/14/2016       Current Outpatient Prescriptions   Medication Sig Dispense Refill    atenolol-chlorthalidone (TENORETIC) 50-25 mg Tab TAKE 1 TABLET TWICE DAILY      cyanocobalamin 1,000 mcg/mL injection 1,000 mcg every 30 days.   0    diphenoxylate-atropine 2.5-0.025 mg (LOMOTIL) 2.5-0.025 mg per tablet Take 1 tablet by mouth 4 (four) times daily as needed for Diarrhea.      ergocalciferol (VITAMIN D2) 50,000 unit Cap Take 1 capsule (50,000 Units total) by mouth every 7 days. 12 capsule 4    gabapentin (NEURONTIN) 100 MG capsule Take 100 mg by mouth 2 (two) times daily.      lanreotide (SOMATULINE DEPOT) 120 mg/0.5 mL Syrg Inject 120 mg into the skin every 28 days.      lifitegrast (XIIDRA) 5 % Dpet Xiidra 5 % eye drops in a dropperette      losartan (COZAAR) 50 MG tablet once daily.       metoprolol tartrate (LOPRESSOR) 50 MG tablet Take 50 mg by mouth 2 (two) times daily.      nystatin (MYCOSTATIN) 100,000 unit/mL suspension SWISH AND SWALLOW 1 TEASPOON BY MOUTH 4 TIMES A DAY. ONCE RELIEVED CONTINUE FOR 48 HOURS      oxyCODONE (ROXICODONE) 30 MG Tab       prednisoLONE acetate (PRED FORTE) 1 % DrpS prednisolone acetate 1 % eye drops,suspension      promethazine (PHENERGAN) 25 MG tablet Take 1 tablet (25 mg total) by mouth every 6 (six) hours as needed for Nausea. 25 tablet 1    XIIDRA 5 % Dpet       zolpidem (AMBIEN) 5 MG Tab Take 1 tablet (5 mg total) by mouth nightly as needed. 10 tablet 0    ferrous gluconate (FERGON) 324 MG tablet TAKE 1 TABLET EVERY DAY WITH BREAKFAST 30 tablet 2    nystatin (MYCOSTATIN) 100,000 unit/mL suspension Take 6 mLs (600,000 Units total) by mouth 4 (four) times daily. 473 mL 1     No current facility-administered medications for this visit.        Assessment:    ICD-10-CM ICD-9-CM    1. Secondary neuroendocrine tumor  of liver C7B.8 209.72 Ambulatory referral to Home Health     209.20 CANE FOR HOME USE   2. Essential hypertension I10 401.9 Ambulatory referral to Home Health      CANE FOR HOME USE   3. Thrush B37.0 112.0 nystatin (MYCOSTATIN) 100,000 unit/mL suspension         Plan:    Follow-up in about 3 months (around 5/7/2018).          Magdalena Hernandez MD

## 2018-02-08 ENCOUNTER — TELEPHONE (OUTPATIENT)
Dept: FAMILY MEDICINE | Facility: CLINIC | Age: 68
End: 2018-02-08

## 2018-02-08 NOTE — TELEPHONE ENCOUNTER
----- Message from Margo Mcmanusopshire sent at 2/8/2018 11:16 AM CST -----  Contact: Jan from [facility] [phone]  Jan from [facility] called because none if the diagnosis codes provided would qualify patient for the cane that was requested. The code that needs to be used is R26.89 . Please advise.

## 2018-02-15 ENCOUNTER — TELEPHONE (OUTPATIENT)
Dept: FAMILY MEDICINE | Facility: CLINIC | Age: 68
End: 2018-02-15

## 2018-02-15 NOTE — TELEPHONE ENCOUNTER
She would like refills on her vit d and also has an abcess tooth and would like medication for that also.

## 2018-02-15 NOTE — TELEPHONE ENCOUNTER
----- Message from Amy Byrd sent at 2/15/2018 12:38 PM CST -----  Contact: Self [phone]  Patient is haven;ling to get refills on her medication sent to [facility]/pharmacy #[id number] - [place] - [address] [phone] (Phone)  [phone] (Fax) she also is requesting antibiotics called in for her tooth     1. ergocalciferol (VITAMIN D2) 50,000 unit Cap 12 capsule

## 2018-02-18 ENCOUNTER — HOSPITAL ENCOUNTER (OUTPATIENT)
Facility: HOSPITAL | Age: 68
LOS: 1 days | Discharge: HOME | End: 2018-02-20
Attending: EMERGENCY MEDICINE | Admitting: SURGERY
Payer: MEDICARE

## 2018-02-18 DIAGNOSIS — R10.9 ABDOMINAL PAIN: ICD-10-CM

## 2018-02-18 DIAGNOSIS — N30.00 ACUTE CYSTITIS WITHOUT HEMATURIA: Primary | ICD-10-CM

## 2018-02-18 DIAGNOSIS — R10.9 INTRACTABLE ABDOMINAL PAIN: ICD-10-CM

## 2018-02-18 DIAGNOSIS — M54.40 ACUTE BILATERAL LOW BACK PAIN WITH SCIATICA, SCIATICA LATERALITY UNSPECIFIED: ICD-10-CM

## 2018-02-18 LAB
ALBUMIN SERPL BCP-MCNC: 3.8 G/DL
ALP SERPL-CCNC: 84 U/L
ALT SERPL W/O P-5'-P-CCNC: 5 U/L
ANION GAP SERPL CALC-SCNC: 15 MMOL/L
AST SERPL-CCNC: 12 U/L
BACTERIA #/AREA URNS HPF: ABNORMAL /HPF
BASOPHILS # BLD AUTO: 0.01 K/UL
BASOPHILS NFR BLD: 0.2 %
BILIRUB SERPL-MCNC: 0.8 MG/DL
BILIRUB UR QL STRIP: NEGATIVE
BUN SERPL-MCNC: 15 MG/DL
CALCIUM SERPL-MCNC: 9.9 MG/DL
CHLORIDE SERPL-SCNC: 102 MMOL/L
CLARITY UR: ABNORMAL
CO2 SERPL-SCNC: 23 MMOL/L
COLOR UR: YELLOW
CREAT SERPL-MCNC: 0.8 MG/DL
DIFFERENTIAL METHOD: ABNORMAL
EOSINOPHIL # BLD AUTO: 0 K/UL
EOSINOPHIL NFR BLD: 0.4 %
ERYTHROCYTE [DISTWIDTH] IN BLOOD BY AUTOMATED COUNT: 14 %
EST. GFR  (AFRICAN AMERICAN): >60 ML/MIN/1.73 M^2
EST. GFR  (NON AFRICAN AMERICAN): >60 ML/MIN/1.73 M^2
GLUCOSE SERPL-MCNC: 107 MG/DL
GLUCOSE UR QL STRIP: NEGATIVE
HCT VFR BLD AUTO: 34.1 %
HGB BLD-MCNC: 10.7 G/DL
HGB UR QL STRIP: ABNORMAL
KETONES UR QL STRIP: ABNORMAL
LACTATE SERPL-SCNC: 1.1 MMOL/L
LACTATE SERPL-SCNC: 1.2 MMOL/L
LEUKOCYTE ESTERASE UR QL STRIP: ABNORMAL
LIPASE SERPL-CCNC: 22 U/L
LYMPHOCYTES # BLD AUTO: 1 K/UL
LYMPHOCYTES NFR BLD: 17.6 %
MCH RBC QN AUTO: 27.4 PG
MCHC RBC AUTO-ENTMCNC: 31.4 G/DL
MCV RBC AUTO: 87 FL
MICROSCOPIC COMMENT: ABNORMAL
MONOCYTES # BLD AUTO: 0.4 K/UL
MONOCYTES NFR BLD: 7.7 %
NEUTROPHILS # BLD AUTO: 4 K/UL
NEUTROPHILS NFR BLD: 73.9 %
NITRITE UR QL STRIP: POSITIVE
PH UR STRIP: 6 [PH] (ref 5–8)
PLATELET # BLD AUTO: 235 K/UL
PMV BLD AUTO: 10.2 FL
POTASSIUM SERPL-SCNC: 3.5 MMOL/L
PROT SERPL-MCNC: 7.9 G/DL
PROT UR QL STRIP: NEGATIVE
RBC # BLD AUTO: 3.91 M/UL
RBC #/AREA URNS HPF: 3 /HPF (ref 0–4)
SODIUM SERPL-SCNC: 140 MMOL/L
SP GR UR STRIP: >=1.03 (ref 1–1.03)
SQUAMOUS #/AREA URNS HPF: 3 /HPF
URN SPEC COLLECT METH UR: ABNORMAL
UROBILINOGEN UR STRIP-ACNC: NEGATIVE EU/DL
WBC # BLD AUTO: 5.44 K/UL
WBC #/AREA URNS HPF: 20 /HPF (ref 0–5)

## 2018-02-18 PROCEDURE — G0378 HOSPITAL OBSERVATION PER HR: HCPCS

## 2018-02-18 PROCEDURE — 25000003 PHARM REV CODE 250: Performed by: SURGERY

## 2018-02-18 PROCEDURE — 83690 ASSAY OF LIPASE: CPT

## 2018-02-18 PROCEDURE — 63600175 PHARM REV CODE 636 W HCPCS: Performed by: EMERGENCY MEDICINE

## 2018-02-18 PROCEDURE — 96375 TX/PRO/DX INJ NEW DRUG ADDON: CPT

## 2018-02-18 PROCEDURE — 96361 HYDRATE IV INFUSION ADD-ON: CPT

## 2018-02-18 PROCEDURE — 25000003 PHARM REV CODE 250: Performed by: EMERGENCY MEDICINE

## 2018-02-18 PROCEDURE — 83605 ASSAY OF LACTIC ACID: CPT

## 2018-02-18 PROCEDURE — 99285 EMERGENCY DEPT VISIT HI MDM: CPT | Mod: 25

## 2018-02-18 PROCEDURE — 83605 ASSAY OF LACTIC ACID: CPT | Mod: 91

## 2018-02-18 PROCEDURE — 81000 URINALYSIS NONAUTO W/SCOPE: CPT

## 2018-02-18 PROCEDURE — 96365 THER/PROPH/DIAG IV INF INIT: CPT

## 2018-02-18 PROCEDURE — 96376 TX/PRO/DX INJ SAME DRUG ADON: CPT

## 2018-02-18 PROCEDURE — 80053 COMPREHEN METABOLIC PANEL: CPT

## 2018-02-18 PROCEDURE — 85025 COMPLETE CBC W/AUTO DIFF WBC: CPT

## 2018-02-18 RX ORDER — METOPROLOL TARTRATE 50 MG/1
50 TABLET ORAL 2 TIMES DAILY
Status: DISCONTINUED | OUTPATIENT
Start: 2018-02-18 | End: 2018-02-20 | Stop reason: HOSPADM

## 2018-02-18 RX ORDER — HYDROMORPHONE HYDROCHLORIDE 2 MG/ML
0.5 INJECTION, SOLUTION INTRAMUSCULAR; INTRAVENOUS; SUBCUTANEOUS
Status: DISCONTINUED | OUTPATIENT
Start: 2018-02-18 | End: 2018-02-20

## 2018-02-18 RX ORDER — ZOLPIDEM TARTRATE 5 MG/1
5 TABLET ORAL NIGHTLY PRN
Status: DISCONTINUED | OUTPATIENT
Start: 2018-02-18 | End: 2018-02-20 | Stop reason: HOSPADM

## 2018-02-18 RX ORDER — ONDANSETRON 2 MG/ML
4 INJECTION INTRAMUSCULAR; INTRAVENOUS
Status: COMPLETED | OUTPATIENT
Start: 2018-02-18 | End: 2018-02-18

## 2018-02-18 RX ORDER — GABAPENTIN 100 MG/1
100 CAPSULE ORAL 2 TIMES DAILY
Status: DISCONTINUED | OUTPATIENT
Start: 2018-02-18 | End: 2018-02-20 | Stop reason: HOSPADM

## 2018-02-18 RX ORDER — HYDROMORPHONE HYDROCHLORIDE 2 MG/ML
0.5 INJECTION, SOLUTION INTRAMUSCULAR; INTRAVENOUS; SUBCUTANEOUS
Status: COMPLETED | OUTPATIENT
Start: 2018-02-18 | End: 2018-02-18

## 2018-02-18 RX ORDER — ONDANSETRON 2 MG/ML
4 INJECTION INTRAMUSCULAR; INTRAVENOUS EVERY 8 HOURS PRN
Status: DISCONTINUED | OUTPATIENT
Start: 2018-02-18 | End: 2018-02-20 | Stop reason: HOSPADM

## 2018-02-18 RX ORDER — DIPHENOXYLATE HYDROCHLORIDE AND ATROPINE SULFATE 2.5; .025 MG/1; MG/1
1 TABLET ORAL 4 TIMES DAILY PRN
Status: DISCONTINUED | OUTPATIENT
Start: 2018-02-18 | End: 2018-02-20 | Stop reason: HOSPADM

## 2018-02-18 RX ORDER — LABETALOL HYDROCHLORIDE 5 MG/ML
10 INJECTION, SOLUTION INTRAVENOUS
Status: COMPLETED | OUTPATIENT
Start: 2018-02-18 | End: 2018-02-18

## 2018-02-18 RX ORDER — LOSARTAN POTASSIUM 50 MG/1
50 TABLET ORAL DAILY
Status: DISCONTINUED | OUTPATIENT
Start: 2018-02-19 | End: 2018-02-20 | Stop reason: HOSPADM

## 2018-02-18 RX ORDER — SODIUM CHLORIDE, SODIUM LACTATE, POTASSIUM CHLORIDE, CALCIUM CHLORIDE 600; 310; 30; 20 MG/100ML; MG/100ML; MG/100ML; MG/100ML
1000 INJECTION, SOLUTION INTRAVENOUS
Status: COMPLETED | OUTPATIENT
Start: 2018-02-18 | End: 2018-02-18

## 2018-02-18 RX ORDER — HYDROMORPHONE HYDROCHLORIDE 2 MG/ML
1 INJECTION, SOLUTION INTRAMUSCULAR; INTRAVENOUS; SUBCUTANEOUS
Status: COMPLETED | OUTPATIENT
Start: 2018-02-18 | End: 2018-02-18

## 2018-02-18 RX ADMIN — ONDANSETRON 4 MG: 2 INJECTION INTRAMUSCULAR; INTRAVENOUS at 08:02

## 2018-02-18 RX ADMIN — LABETALOL HYDROCHLORIDE 10 MG: 5 INJECTION, SOLUTION INTRAVENOUS at 05:02

## 2018-02-18 RX ADMIN — HYDROMORPHONE HYDROCHLORIDE 1 MG: 2 INJECTION INTRAMUSCULAR; INTRAVENOUS; SUBCUTANEOUS at 09:02

## 2018-02-18 RX ADMIN — SODIUM CHLORIDE, SODIUM LACTATE, POTASSIUM CHLORIDE, AND CALCIUM CHLORIDE 1000 ML: .6; .31; .03; .02 INJECTION, SOLUTION INTRAVENOUS at 05:02

## 2018-02-18 RX ADMIN — HYDROMORPHONE HYDROCHLORIDE 0.5 MG: 2 INJECTION INTRAMUSCULAR; INTRAVENOUS; SUBCUTANEOUS at 06:02

## 2018-02-18 RX ADMIN — GABAPENTIN 100 MG: 100 CAPSULE ORAL at 09:02

## 2018-02-18 RX ADMIN — HYDROMORPHONE HYDROCHLORIDE 0.5 MG: 2 INJECTION INTRAMUSCULAR; INTRAVENOUS; SUBCUTANEOUS at 01:02

## 2018-02-18 RX ADMIN — CEFTRIAXONE 2 G: 2 INJECTION, SOLUTION INTRAVENOUS at 11:02

## 2018-02-18 RX ADMIN — METOPROLOL TARTRATE 50 MG: 50 TABLET, FILM COATED ORAL at 09:02

## 2018-02-18 RX ADMIN — SODIUM CHLORIDE, SODIUM LACTATE, POTASSIUM CHLORIDE, AND CALCIUM CHLORIDE 1000 ML: .6; .31; .03; .02 INJECTION, SOLUTION INTRAVENOUS at 01:02

## 2018-02-18 RX ADMIN — HYDROMORPHONE HYDROCHLORIDE 0.5 MG: 2 INJECTION INTRAMUSCULAR; INTRAVENOUS; SUBCUTANEOUS at 09:02

## 2018-02-18 RX ADMIN — HYDROMORPHONE HYDROCHLORIDE 0.5 MG: 2 INJECTION INTRAMUSCULAR; INTRAVENOUS; SUBCUTANEOUS at 08:02

## 2018-02-18 RX ADMIN — ZOLPIDEM TARTRATE 5 MG: 5 TABLET ORAL at 09:02

## 2018-02-18 NOTE — ED NOTES
Pt c/o intermittent abdominal pain and diarrhea since she had her gallbladder removed by Dr. Perez about 1 month ago. States she began having worse RUQ pain, around the incision site, that radiates to right middle back a couple of days ago, and states she has been having diarrhea for the past couple of days. Also c/o nausea, but denies vomiting. Pt appears uncomfortable. Is able to ambulate with assistance. States she has had some discomfort when urinating recently.

## 2018-02-18 NOTE — ED NOTES
Pt connected to cardiac monitor, pulse ox, and BP cuff. Call bell within reach. Will continue to monitor.

## 2018-02-18 NOTE — H&P
Surgery History and Physical    CC: abdominal pain    HPI: 67yoF with history of metastatic NET s/p open cholecystectomy in January presents with two day history of worsening abdominal pain. Reports pain is in RUQ, severe, continuous. Reports subjective hotness/coldness, and some nausea no emesis. Diarrhea since cholecystectomy continues but is improved to 3x/day (versus 6x/day previously). 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)      Past Surgical History:   Procedure Laterality Date    CATARACT EXTRACTION Left 10/2017    CHOLECYSTECTOMY      COLON SURGERY      cystoscope      EYE SURGERY      HYSTERECTOMY  5/1996    LITHOTRIPSY      LIVER BIOPSY  9/14    carcinoid     Social History   Substance Use Topics    Smoking status: Never Smoker    Smokeless tobacco: Never Used    Alcohol use No     Family History   Problem Relation Age of Onset    Cancer Mother      unknown    Alzheimer's disease Father     Stroke Sister     No Known Problems Son     No Known Problems Son     No Known Problems Son     No Known Problems Son      Review of patient's allergies indicates:   Allergen Reactions    Epinephrine Anaphylaxis     Can cause  a Carcinoid Crisis    Contrast media Hives, Itching and Swelling    Ibuprofen Hives, Itching and Swelling    Iodinated contrast- oral and iv dye     Sulfa (sulfonamide antibiotics) Hives, Itching and Swelling     Home Medications:    Physical Exam:  Vitals:    02/18/18 1350   BP: (!) 173/70   Pulse: 82   Resp: 13   Temp:      Gen: NAD, AAOx3  HEENT: NCAT, EOMI, MMM  CV: RRR  Pulm: CTA-B  Abd: soft, ND, mildly TTP in RUQ,  no rebound/guarding, incisions- well/healed  Ext: 2+DP, MAEW  Skin: dry, intact  Mood: appropriate    Recent Labs      02/18/18   0748   WBC  5.44   HGB  10.7*   HCT  34.1*   PLT  235   NA  140   K  3.5   BUN  15   CREATININE  0.8       Imaging:  CT abd/pelvis:   There is bilateral nonobstructing nephrolithiasis at the lower pole of both kidneys. No obstructive uropathy is seen.  Postsurgical changes of cholecystectomy are identified with interval near-complete resolution of the mixed fluid density in the cholecystectomy bed. There is a residual small focus of air within the resection cavity, markedly improved.  Postoperative changes of partial colectomy are identified. There is liquid stool in the colon suggesting diarrhea.  Focal hypodensity in the periphery of the right hepatic lobe, similar to the previous study a known represent metastatic disease.    A/P: 67yoF with history of metastatic NET and cholecystectomy in January presents with abdominal pain  - admit to obs  - pain control  - IVF  - one dose rocephin for positive UA  - RUQ ultrasound    Livia Gaytan MD  [facility]

## 2018-02-18 NOTE — ED PROVIDER NOTES
Encounter Date: 2/18/2018    SCRIBE #1 NOTE: Gen BEATTY, han scribing for, and in the presence of, Dr. Booker.       History     Chief Complaint   Patient presents with    Abdominal Pain     abd pain with diarreha x 1 month s/p cholecystectomy per Chris     Time patient was seen by the provider: 8:21 AM      The patient is a 67 y.o. female with hx: HTN, pyelonephritis, and malignant carcinoid colon tumor who presents to the ED with a complaint of worsening sharp RUQ abdominal pain for two days. Patient took anti-diarrheal medication, with relief. Patient reports diarrhea which appears slightly yellow and red. She also notes chills and a fever. She denies any vomiting. Patient had a cholecystectomy one month ago.      The history is provided by the patient.     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)      Past Surgical History:   Procedure Laterality Date    CATARACT EXTRACTION Left 10/2017    CHOLECYSTECTOMY      COLON SURGERY      cystoscope      EYE SURGERY      HYSTERECTOMY  5/1996    LITHOTRIPSY      LIVER BIOPSY  9/14    carcinoid     Family History   Problem Relation Age of Onset    Cancer Mother      unknown    Alzheimer's disease Father     Stroke Sister     No Known Problems Son     No Known Problems Son     No Known Problems Son     No Known Problems Son      Social History   Substance Use Topics    Smoking status: Never Smoker    Smokeless tobacco: Never Used     Alcohol use No     Review of Systems   Constitutional: Positive for chills and fever.   HENT: Negative for congestion, rhinorrhea and sore throat.    Eyes: Negative for pain and visual disturbance.   Respiratory: Negative for shortness of breath.    Cardiovascular: Negative for chest pain.   Gastrointestinal: Positive for abdominal pain and diarrhea. Negative for nausea and vomiting.   Genitourinary: Negative for dysuria and hematuria.   Musculoskeletal: Negative for back pain, neck pain and neck stiffness.   Skin: Negative for rash.   Neurological: Negative for weakness.   Psychiatric/Behavioral: Negative for confusion.       Physical Exam     Initial Vitals [02/18/18 0718]   BP Pulse Resp Temp SpO2   (!) 171/88 (!) 112 19 98.5 °F (36.9 °C) 99 %      MAP       115.67         Physical Exam    Nursing note and vitals reviewed.  Constitutional: She appears well-developed and well-nourished.   HENT:   Head: Normocephalic and atraumatic.   Eyes: Pupils are equal, round, and reactive to light.   Cardiovascular: Normal rate and regular rhythm.   Pulmonary/Chest: Breath sounds normal. No respiratory distress. She has no wheezes.   Abdominal: There is tenderness.   Hypoactive bowel sounds.  Mild diffuse tenderness, RUQ tenderness   Musculoskeletal: Normal range of motion.   Neurological: She is alert and oriented to person, place, and time.         ED Course   Procedures  Labs Reviewed   CBC W/ AUTO DIFFERENTIAL - Abnormal; Notable for the following:        Result Value    RBC 3.91 (*)     Hemoglobin 10.7 (*)     Hematocrit 34.1 (*)     MCHC 31.4 (*)     Gran% 73.9 (*)     Lymph% 17.6 (*)     All other components within normal limits    Narrative:     For upper or mid abdominal pain.   COMPREHENSIVE METABOLIC PANEL - Abnormal; Notable for the following:     ALT 5 (*)     All other components within normal limits    Narrative:     For upper or mid abdominal pain.   URINALYSIS - Abnormal; Notable for the following:      Appearance, UA Hazy (*)     Specific Gravity, UA >=1.030 (*)     Ketones, UA 1+ (*)     Occult Blood UA Trace (*)     Nitrite, UA Positive (*)     Leukocytes, UA 1+ (*)     All other components within normal limits   URINALYSIS MICROSCOPIC - Abnormal; Notable for the following:     WBC, UA 20 (*)     Bacteria, UA Many (*)     All other components within normal limits   LIPASE    Narrative:     For upper or mid abdominal pain.   LACTIC ACID, PLASMA    Narrative:     For upper or mid abdominal pain.   LACTIC ACID, PLASMA        Imaging Results          US Abdomen Limited (Gallbladder) (Final result)  Result time 02/18/18 14:41:18    Final result by Monika Ray MD (02/18/18 14:41:18)                 Impression:        Cholecystectomy.    Mildly dilated common bile duct proximally at 8-9 mm, without associated intrahepatic biliary ductal dilatation, which may be related to post cholecystectomy status.      Electronically signed by: MONIKA RAY MD, MD  Date:     02/18/18  Time:    14:41              Narrative:    Comparison: CT renal stone study earlier same day    Findings: Right upper quadrant ultrasound performed.  The liver measures 13.9 cm in length and is homogeneous in echogenicity.  Common bile duct measures up to 8-9 mm proximally. No intrahepatic biliary ductal dilatation or debris or stones seen within the proximal portion. Gallbladder has been removed. No sonographic Irby sign. The visualized portions of the pancreas and IVC are within normal limits.  The right kidney is normal in length measuring 10.6cm without hydronephrosis.   No ascites in the imaged region.                             CT Renal Stone Study ABD Pelvis WO (Final result)  Result time 02/18/18 10:07:42    Final result by Miriam Greenwood MD (02/18/18 10:07:42)                 Narrative:    Comparison: One 418    Technique: 5 mm CT images of the abdomen and pelvis were obtained without contrast in a renal stone protocol.    Results:  There is bibasilar subsegmental atelectasis. Tiny subcentimeter nodules in the left lung base, stable. The base of the heart appears normal. The aorta is of normal caliber and tapers appropriately, demonstrating mild calcified atheromatous disease.    Postsurgical changes are seen consistent with recent cholecystectomy with multiple surgical clips in the gallbladder bed. There is a continued small focus of air in the region of the gallbladder bed; however, interval resolution of the abnormal mixed opacity seen in the gallbladder fat-sat compared to the prior examination. An intrahepatic or extrahepatic biliary ductal dilatation is identified. There is a stable subcentimeter hypodensity in the posterior aspect of the right hepatic lobe. The spleen, pancreas and adrenal glands are unremarkable.    Both kidneys are normal in size, shape and contour. Large nonobstructing stones are seen in the lower pole of the right kidney and lower pole of the left kidney. No obstructive uropathy is seen. The urinary bladder is well-distended and within normal limits.    Liquid stool is seen throughout the colon suggesting diarrhea. Postoperative changes of the bowel are identified compatible with prior partial colonic resection. No free air, free fluid or obstruction is detected. No pathologically enlarged abdominal or pelvic lymph nodes are seen.    There is bilateral sacroiliitis. Age-appropriate degenerative changes affect the skeleton.    There is bilateral nonobstructing nephrolithiasis at the lower pole of both kidneys. No obstructive uropathy is seen.    Postsurgical changes of cholecystectomy are identified with interval near-complete resolution of the mixed fluid density in the cholecystectomy bed. There is a residual small focus of air within the resection cavity, markedly improved.    Postoperative changes of partial colectomy are identified. There is liquid stool in the colon suggesting diarrhea.    Focal hypodensity in  the periphery of the right hepatic lobe, similar to the previous study a known represent metastatic disease.      Electronically signed by: Miriam Greenwood MD  Date:     02/18/18  Time:    10:07                                  Medical Decision Making:   History:   Old Medical Records: I decided to obtain old medical records.  Initial Assessment:   67 y.o. Female presents with worsening sharp RUQ abdominal pain for two days.  Differential Diagnosis:   Cholecystitis, appendicitis, SBO, intraabdominal abscess, colitis, UTI  Clinical Tests:   Lab Tests: Ordered and Reviewed  Radiological Study: Ordered and Reviewed  ED Management:  Patient with intractable abdominal pain.  Patient admitted to surgery for obs                   ED Course as of Feb 18 1626   Sun Feb 18, 2018   0814 BP: (!) 171/88 [LD]   0814 Temp: 98.5 °F (36.9 °C) [LD]   0814 Pulse: (!) 112 [LD]   0814 Resp: 19 [LD]   0814 SpO2: 99 % [LD]   1308 Case discussed with Dr Gaytan.  Will admit for obs  [LD]      ED Course User Index  [LD] Chely Booker MD     Clinical Impression:   Diagnoses of Intractable abdominal pain and Abdominal pain were pertinent to this visit.    Disposition:   Disposition: Admitted       I, Chely Booker,  personally performed the services described in this documentation. All medical record entries made by the scribe were at my direction and in my presence.  I have reviewed the chart and agree that the record reflects my personal performance and is accurate and complete. Chely Booker M.D. 10:52 PM02/19/2018                   Chely Booker MD  02/19/18 [id number]

## 2018-02-18 NOTE — ED NOTES
Pt is c/o pain to right upper arm IV when flushed. No swelling noted, and minimal blood return is still noted. Pt agrees to have new IV started. IV removed per protocol. Coban dressing applied

## 2018-02-19 LAB — AMYLASE SERPL-CCNC: 25 U/L

## 2018-02-19 PROCEDURE — 63600175 PHARM REV CODE 636 W HCPCS: Performed by: EMERGENCY MEDICINE

## 2018-02-19 PROCEDURE — 82150 ASSAY OF AMYLASE: CPT

## 2018-02-19 PROCEDURE — 25000003 PHARM REV CODE 250: Performed by: SURGERY

## 2018-02-19 PROCEDURE — 25000003 PHARM REV CODE 250: Performed by: STUDENT IN AN ORGANIZED HEALTH CARE EDUCATION/TRAINING PROGRAM

## 2018-02-19 PROCEDURE — G0378 HOSPITAL OBSERVATION PER HR: HCPCS

## 2018-02-19 PROCEDURE — 63600175 PHARM REV CODE 636 W HCPCS: Performed by: SURGERY

## 2018-02-19 PROCEDURE — 94761 N-INVAS EAR/PLS OXIMETRY MLT: CPT

## 2018-02-19 PROCEDURE — 36415 COLL VENOUS BLD VENIPUNCTURE: CPT

## 2018-02-19 RX ORDER — LIDOCAINE 50 MG/G
1 PATCH TOPICAL
Status: DISCONTINUED | OUTPATIENT
Start: 2018-02-19 | End: 2018-02-20 | Stop reason: HOSPADM

## 2018-02-19 RX ORDER — HYDRALAZINE HYDROCHLORIDE 20 MG/ML
10 INJECTION INTRAMUSCULAR; INTRAVENOUS EVERY 6 HOURS PRN
Status: DISCONTINUED | OUTPATIENT
Start: 2018-02-19 | End: 2018-02-20 | Stop reason: HOSPADM

## 2018-02-19 RX ORDER — CHOLESTYRAMINE 4 G/9G
1 POWDER, FOR SUSPENSION ORAL
Status: DISCONTINUED | OUTPATIENT
Start: 2018-02-19 | End: 2018-02-20 | Stop reason: HOSPADM

## 2018-02-19 RX ADMIN — GABAPENTIN 100 MG: 100 CAPSULE ORAL at 09:02

## 2018-02-19 RX ADMIN — HYDROMORPHONE HYDROCHLORIDE 0.5 MG: 2 INJECTION INTRAMUSCULAR; INTRAVENOUS; SUBCUTANEOUS at 06:02

## 2018-02-19 RX ADMIN — CHOLESTYRAMINE 4 G: 4 POWDER, FOR SUSPENSION ORAL at 05:02

## 2018-02-19 RX ADMIN — CHOLESTYRAMINE 4 G: 4 POWDER, FOR SUSPENSION ORAL at 01:02

## 2018-02-19 RX ADMIN — LOSARTAN POTASSIUM 50 MG: 50 TABLET, FILM COATED ORAL at 08:02

## 2018-02-19 RX ADMIN — METOPROLOL TARTRATE 50 MG: 50 TABLET, FILM COATED ORAL at 09:02

## 2018-02-19 RX ADMIN — DIPHENOXYLATE HYDROCHLORIDE AND ATROPINE SULFATE 1 TABLET: 2.5; .025 TABLET ORAL at 10:02

## 2018-02-19 RX ADMIN — ONDANSETRON 4 MG: 2 INJECTION INTRAMUSCULAR; INTRAVENOUS at 08:02

## 2018-02-19 RX ADMIN — LIDOCAINE 1 PATCH: 50 PATCH TOPICAL at 12:02

## 2018-02-19 RX ADMIN — HYDROMORPHONE HYDROCHLORIDE 0.5 MG: 2 INJECTION INTRAMUSCULAR; INTRAVENOUS; SUBCUTANEOUS at 07:02

## 2018-02-19 RX ADMIN — HYDROMORPHONE HYDROCHLORIDE 0.5 MG: 2 INJECTION INTRAMUSCULAR; INTRAVENOUS; SUBCUTANEOUS at 03:02

## 2018-02-19 RX ADMIN — ZOLPIDEM TARTRATE 5 MG: 5 TABLET ORAL at 09:02

## 2018-02-19 RX ADMIN — HYDROMORPHONE HYDROCHLORIDE 0.5 MG: 2 INJECTION INTRAMUSCULAR; INTRAVENOUS; SUBCUTANEOUS at 04:02

## 2018-02-19 RX ADMIN — HYDROMORPHONE HYDROCHLORIDE 0.25 MG: 2 INJECTION INTRAMUSCULAR; INTRAVENOUS; SUBCUTANEOUS at 10:02

## 2018-02-19 RX ADMIN — HYDRALAZINE HYDROCHLORIDE 10 MG: 20 INJECTION INTRAMUSCULAR; INTRAVENOUS at 04:02

## 2018-02-19 RX ADMIN — DIPHENOXYLATE HYDROCHLORIDE AND ATROPINE SULFATE 1 TABLET: 2.5; .025 TABLET ORAL at 05:02

## 2018-02-19 RX ADMIN — HYDROMORPHONE HYDROCHLORIDE 0.5 MG: 2 INJECTION INTRAMUSCULAR; INTRAVENOUS; SUBCUTANEOUS at 12:02

## 2018-02-19 RX ADMIN — HYDROMORPHONE HYDROCHLORIDE 0.5 MG: 2 INJECTION INTRAMUSCULAR; INTRAVENOUS; SUBCUTANEOUS at 01:02

## 2018-02-19 RX ADMIN — HYDROMORPHONE HYDROCHLORIDE 0.5 MG: 2 INJECTION INTRAMUSCULAR; INTRAVENOUS; SUBCUTANEOUS at 11:02

## 2018-02-19 RX ADMIN — METOPROLOL TARTRATE 50 MG: 50 TABLET, FILM COATED ORAL at 08:02

## 2018-02-19 RX ADMIN — GABAPENTIN 100 MG: 100 CAPSULE ORAL at 08:02

## 2018-02-19 NOTE — NURSING
Arrived on  Unit at 18:15   Complaining of upper right abd pain    Medicated for  Pain   Oriented to surroundings   Iv fluid initiated   Home medication list validated.  Call placed to dr pollack for further orders  Report given to oncoming shift    Oncoming shift informed admit assessment not completed

## 2018-02-19 NOTE — PROGRESS NOTES
Surgery Progress Note:     Overnight:  HTN overnight with appropriate response to hydralazine. BM (soft, loose, non-bloody) x 4 overnight.  Today, patient reports RUQ pain with radiation to back and worse with movement. Tolerating diet without n/v.      Temp:  [98 °F (36.7 °C)-99 °F (37.2 °C)] 98 °F (36.7 °C)  Pulse:  [] 86  Resp:  [13-21] 21  SpO2:  [1 %-100 %] 98 %  BP: (159-214)/(54-94) 161/71      Intake/Output Summary (Last 24 hours) at 02/19/18 0714  Last data filed at 02/18/18 1737   Gross per 24 hour   Intake             1000 ml   Output                0 ml   Net             1000 ml     UOP: not recorded  BM: x4    Physical Exam   Constitutional:  Awake, alert and oriented x 3, NAD  HENT: ncat, mmm, neck normal rom and supple   Cardiovascular: RRR no mrg  Pulmonary/Chest: Effort normal, CTA b/l no wheezes, rales, rhonchi   Abdominal: Soft. +bs, ntnd, mildly ttp RUQ  Musculoskeletal: Normal range of motion.  no edema, tenderness or deformity.   Neurological:  AAOx3, no focal deficits noted, CN II-XII grossly intact  Skin: Skin is warm and dry. not diaphoretic.   Psychiatric:  normal mood and affect.  behavior is normal. Judgment and thought content normal.   Nursing note and vitals reviewed.    LABS  CBC    Recent Labs  Lab 02/18/18  0748   WBC 5.44   RBC 3.91*   HGB 10.7*   HCT 34.1*      MCV 87   MCH 27.4   MCHC 31.4*     BMP    Recent Labs  Lab 02/18/18  0748      K 3.5   CO2 23      BUN 15   CREATININE 0.8          Recent Labs  Lab 02/18/18  0748   CALCIUM 9.9     LFT    Recent Labs  Lab 02/18/18  0748   PROT 7.9   ALBUMIN 3.8   BILITOT 0.8   AST 12   ALKPHOS 84   ALT 5*     UA    Recent Labs  Lab 02/18/18  0802   COLORU Yellow   SPECGRAV >=1.030*   PHUR 6.0   PROTEINUA Negative   BACTERIA Many*     LAST HbA1c  Lab Results   Component Value Date    HGBA1C 5.6 12/30/2017     RUQ U/S: Mildly dilated common bile duct proximally at 8-9 mm, without associated intrahepatic  biliary ductal dilatation, which may be related to post cholecystectomy status.      A/P: 67yoF with history of metastatic NET and cholecystectomy in January presented with abdominal pain, now improving    - pain control with prn dilaudid with plan to transition to po  - IVF  - IV rocephin x 1 for positive UA   - tolerating diet    Diet: adult reqular  Ppx: diane  Code: full  Dispo: follow up pain control and toleration of diet, likely d/c today or tomorrow    Dana Morris D.O.  [facility] HO-2  02/19/2018

## 2018-02-20 ENCOUNTER — TELEPHONE (OUTPATIENT)
Dept: ADMINISTRATIVE | Facility: CLINIC | Age: 68
End: 2018-02-20

## 2018-02-20 ENCOUNTER — TELEPHONE (OUTPATIENT)
Dept: NEUROLOGY | Facility: HOSPITAL | Age: 68
End: 2018-02-20

## 2018-02-20 VITALS
DIASTOLIC BLOOD PRESSURE: 84 MMHG | BODY MASS INDEX: 22.82 KG/M2 | RESPIRATION RATE: 20 BRPM | OXYGEN SATURATION: 100 % | TEMPERATURE: 97 F | HEART RATE: 75 BPM | WEIGHT: 142 LBS | HEIGHT: 66 IN | SYSTOLIC BLOOD PRESSURE: 194 MMHG

## 2018-02-20 DIAGNOSIS — K05.219 GUM ABSCESS: ICD-10-CM

## 2018-02-20 DIAGNOSIS — E55.9 VITAMIN D DEFICIENCY: Primary | ICD-10-CM

## 2018-02-20 PROBLEM — M54.50 LOW BACK PAIN: Status: ACTIVE | Noted: 2018-02-20

## 2018-02-20 PROCEDURE — 25000003 PHARM REV CODE 250: Performed by: SURGERY

## 2018-02-20 PROCEDURE — 25000003 PHARM REV CODE 250: Performed by: STUDENT IN AN ORGANIZED HEALTH CARE EDUCATION/TRAINING PROGRAM

## 2018-02-20 PROCEDURE — 63600175 PHARM REV CODE 636 W HCPCS: Performed by: EMERGENCY MEDICINE

## 2018-02-20 PROCEDURE — 94761 N-INVAS EAR/PLS OXIMETRY MLT: CPT

## 2018-02-20 PROCEDURE — G0378 HOSPITAL OBSERVATION PER HR: HCPCS

## 2018-02-20 RX ORDER — LIDOCAINE 50 MG/G
1 PATCH TOPICAL DAILY
Qty: 30 PATCH | Refills: 0 | Status: SHIPPED | OUTPATIENT
Start: 2018-02-20 | End: 2018-03-09

## 2018-02-20 RX ORDER — NITROFURANTOIN 25; 75 MG/1; MG/1
100 CAPSULE ORAL EVERY 12 HOURS
Status: DISCONTINUED | OUTPATIENT
Start: 2018-02-20 | End: 2018-02-20 | Stop reason: HOSPADM

## 2018-02-20 RX ORDER — PENICILLIN V POTASSIUM 500 MG/1
500 TABLET, FILM COATED ORAL 4 TIMES DAILY
Qty: 40 TABLET | Refills: 0 | Status: SHIPPED | OUTPATIENT
Start: 2018-02-20 | End: 2018-03-02

## 2018-02-20 RX ORDER — LIDOCAINE 50 MG/G
1 PATCH TOPICAL DAILY
Qty: 30 PATCH | Refills: 0 | Status: SHIPPED | OUTPATIENT
Start: 2018-02-20 | End: 2018-02-20

## 2018-02-20 RX ORDER — ACETAMINOPHEN 325 MG/1
650 TABLET ORAL EVERY 6 HOURS PRN
Status: DISCONTINUED | OUTPATIENT
Start: 2018-02-20 | End: 2018-02-20 | Stop reason: HOSPADM

## 2018-02-20 RX ORDER — OXYCODONE HYDROCHLORIDE 5 MG/1
5 TABLET ORAL ONCE
Status: DISCONTINUED | OUTPATIENT
Start: 2018-02-20 | End: 2018-02-20

## 2018-02-20 RX ORDER — OXYCODONE HYDROCHLORIDE 5 MG/1
5 TABLET ORAL ONCE
Status: COMPLETED | OUTPATIENT
Start: 2018-02-20 | End: 2018-02-20

## 2018-02-20 RX ORDER — NITROFURANTOIN 25; 75 MG/1; MG/1
100 CAPSULE ORAL EVERY 12 HOURS
Qty: 10 CAPSULE | Refills: 0 | Status: SHIPPED | OUTPATIENT
Start: 2018-02-20 | End: 2018-02-25

## 2018-02-20 RX ORDER — CHOLESTYRAMINE 4 G/9G
1 POWDER, FOR SUSPENSION ORAL
Qty: 90 PACKET | Refills: 11 | Status: ON HOLD | OUTPATIENT
Start: 2018-02-20 | End: 2018-04-27

## 2018-02-20 RX ORDER — CHOLESTYRAMINE 4 G/9G
1 POWDER, FOR SUSPENSION ORAL
Qty: 90 PACKET | Refills: 11 | Status: SHIPPED | OUTPATIENT
Start: 2018-02-20 | End: 2018-02-20

## 2018-02-20 RX ADMIN — CHOLESTYRAMINE 4 G: 4 POWDER, FOR SUSPENSION ORAL at 07:02

## 2018-02-20 RX ADMIN — HYDROMORPHONE HYDROCHLORIDE 0.5 MG: 2 INJECTION INTRAMUSCULAR; INTRAVENOUS; SUBCUTANEOUS at 08:02

## 2018-02-20 RX ADMIN — GABAPENTIN 100 MG: 100 CAPSULE ORAL at 09:02

## 2018-02-20 RX ADMIN — DIPHENOXYLATE HYDROCHLORIDE AND ATROPINE SULFATE 1 TABLET: 2.5; .025 TABLET ORAL at 03:02

## 2018-02-20 RX ADMIN — DIPHENOXYLATE HYDROCHLORIDE AND ATROPINE SULFATE 1 TABLET: 2.5; .025 TABLET ORAL at 11:02

## 2018-02-20 RX ADMIN — HYDROMORPHONE HYDROCHLORIDE 0.5 MG: 2 INJECTION INTRAMUSCULAR; INTRAVENOUS; SUBCUTANEOUS at 02:02

## 2018-02-20 RX ADMIN — OXYCODONE HYDROCHLORIDE 5 MG: 5 TABLET ORAL at 03:02

## 2018-02-20 RX ADMIN — HYDROMORPHONE HYDROCHLORIDE 0.5 MG: 2 INJECTION INTRAMUSCULAR; INTRAVENOUS; SUBCUTANEOUS at 05:02

## 2018-02-20 RX ADMIN — NITROFURANTOIN (MONOHYDRATE/MACROCRYSTALS) 100 MG: 75; 25 CAPSULE ORAL at 11:02

## 2018-02-20 RX ADMIN — CHOLESTYRAMINE 4 G: 4 POWDER, FOR SUSPENSION ORAL at 12:02

## 2018-02-20 RX ADMIN — LOSARTAN POTASSIUM 50 MG: 50 TABLET, FILM COATED ORAL at 09:02

## 2018-02-20 RX ADMIN — METOPROLOL TARTRATE 50 MG: 50 TABLET, FILM COATED ORAL at 09:02

## 2018-02-20 RX ADMIN — LIDOCAINE 1 PATCH: 50 PATCH TOPICAL at 11:02

## 2018-02-20 NOTE — PLAN OF CARE
Problem: Patient Care Overview  Goal: Plan of Care Review  Outcome: Ongoing (interventions implemented as appropriate)  patient AAox4. No distress over night. Complaints of pain entire night. Asking for pain medication every 3 hours for back pain. Ambulating to bathroom. Loose stools over night. Tolerating diet . teds to BLE. Bed alarm on. Side rails up. Safety ensured. Call light within reach

## 2018-02-20 NOTE — TELEPHONE ENCOUNTER
----- Message from Angela Cuba sent at 2/20/2018 11:37 AM CST -----  Contact: Patient  BRIAN- Patient needs a call in regards to needed testing. Call back number is [phone].

## 2018-02-20 NOTE — PLAN OF CARE
Future Appointments  Date Time Provider Department Center   2/23/2018 10:00 AM HOSPITAL LAB, Count includes the [facility] Count includes the [facility] LAB Count includes the [facility]   2/23/2018 11:30 AM CHAIR 07 [facility]   pt has transportation to home   will recuperate with son         02/20/18 1542   Final Note   Assessment Type Final Discharge Note   Discharge Disposition Home   What phone number can be called within the next 1-3 days to see how you are doing after discharge? [phone]   Hospital Follow Up  Appt(s) scheduled? Yes   Discharge plans and expectations educations in teach back method with documentation complete? Yes   Right Care Referral Info   Post Acute Recommendation SNF / Sub-Acute Rehab   Referral Type (no care )

## 2018-02-20 NOTE — PROGRESS NOTES
Surgery Progress Note:     Overnight:  ab CHARLEEN pain well controlled, tolerating diet without nausea or vomiting. Reports 3 BM over past 24 hours c/w normal routine. Reports some low back pain that improved with lidocaine patch.      Temp:  [97.3 °F (36.3 °C)-98.5 °F (36.9 °C)] 97.3 °F (36.3 °C)  Pulse:  [64-73] 73  Resp:  [18-20] 20  SpO2:  [98 %-99 %] 98 %  BP: (137-166)/(63-72) 149/63      Intake/Output Summary (Last 24 hours) at 02/20/18 0832  Last data filed at 02/20/18 0508   Gross per 24 hour   Intake             1178 ml   Output               25 ml   Net             1153 ml     UOP: 25 plus unrecorded  BM: x 3    Physical Exam   Constitutional:  Awake, alert and oriented x 3, NAD  HENT: ncat, mmm, neck normal rom and supple   Cardiovascular: RRR no mrg  Pulmonary/Chest: Effort normal, CTA b/l no wheezes, rales, rhonchi   Abdominal: Soft. +bs, ntnd, mildly ttp but improved  Musculoskeletal: Normal range of motion.  no edema  Neurological:  AAOx3, no focal deficits noted, CN II-XII grossly intact  Skin: Skin is warm and dry. not diaphoretic.   Psychiatric:  normal mood and affect.  behavior is normal. Judgment and thought content normal.   Nursing note and vitals reviewed.    LABS  CBC    Recent Labs  Lab 02/18/18  0748   WBC 5.44   RBC 3.91*   HGB 10.7*   HCT 34.1*      MCV 87   MCH 27.4   MCHC 31.4*     BMP    Recent Labs  Lab 02/18/18  0748      K 3.5   CO2 23      BUN 15   CREATININE 0.8          Recent Labs  Lab 02/18/18  0748   CALCIUM 9.9     LFT    Recent Labs  Lab 02/18/18  0748   PROT 7.9   ALBUMIN 3.8   BILITOT 0.8   AST 12   ALKPHOS 84   ALT 5*       A/P: 67yoF with history of metastatic NET and cholecystectomy in January presented with abdominal pain, now improving    - pain improving  - IVF  - s/p IV rocephin x 1 for positive UA   - tolerating regular diet without n/v  - cholestyramine TID with meals to help with BM  - lidocaine patch for low back pain  encourage  OOB      Diet: adult reqular  Ppx: diane  Code: full  Dispo:  d/c today    Dana Morris D.O.  [facility] HO-2  02/20/2018

## 2018-02-20 NOTE — NURSING
Discharge instructions discussed with pt, who verbalized an understanding of medication regime and follow up appts. To  antibiotic already phoned into [facility] and given 2 Rx, for Lidoderm patch and cholesterase, which she understands needs to be brought to pharmacy. Pt stated that she has refills on her pain medication at home.Son is here to bring her home. Transported in wheelchair off unit.

## 2018-02-20 NOTE — NURSING
Charge nurse spoke with Dr Morris. jeremias for one time dose Oxy 5 Mg given. Will administer pain medication.

## 2018-02-20 NOTE — PLAN OF CARE
TN met with pt   lives with sierra Salcedo       physical address [address]  [place]      Future Appointments  Date Time Provider Department Center   2/23/2018 11:30 AM CHAIR 07 [facility] [place]     pt will make f/u apt when she visits office for this apt.      d/c summ to be faxed to pcp.         02/20/18 1347   Discharge Assessment   Assessment Type Discharge Planning Assessment   Confirmed/corrected address and phone number on facesheet? Yes   Assessment information obtained from? Patient;Medical Record   Expected Length of Stay (days) 2   Communicated expected length of stay with patient/caregiver yes   Prior to hospitilization cognitive status: Alert/Oriented   Prior to hospitalization functional status: Independent   Current cognitive status: Alert/Oriented   Current Functional Status: Independent   Lives With child(katherine), adult   Able to Return to Prior Arrangements yes   Is patient able to care for self after discharge? Yes   Who are your caregiver(s) and their phone number(s)? (dede Ochoa [phone];  [place]   )   Patient's perception of discharge disposition home or selfcare   Readmission Within The Last 30 Days no previous admission in last 30 days   Equipment Currently Used at Home bedside commode;walker, rolling;bath bench   Do you have any problems affording any of your prescribed medications? No   Is the patient taking medications as prescribed? yes   Does the patient have transportation home? Yes   Transportation Available family or friend will provide   Does the patient receive services at the [facility]? No   Discharge Plan A Home with family;Home   Patient/Family In Agreement With Plan yes

## 2018-02-20 NOTE — NURSING
Pt has been crying most of the day because the MD has discontinued her every 3 hr Dilaudid injection. Has Tylenol for pain, but is refusing to take it . C/o having diarrhea and is being given her Cholesterase and Lomotil. Has also had Critic aid paste applied to rectal are several times. Dr Morris has been phoned twice per pt request and is adamant about the pain med . Pt is not complaining of abd pain, but right lower back and perianal pain. Requires much persuasion to get out of bed into the chair, and then does not want to sit up for long. Pt has written discharge orders, but can't leave until her son picks her up after work. Pt has called the transitional navigator several times and is now calling for the charge nurse. Both RN and PCT have been in the room often with encouragement and reassurance. Will continue to offer Tylenol.

## 2018-02-21 NOTE — DISCHARGE SUMMARY
[facility]  Discharge Summary      Admit Date: 2/18/2018    Discharge Date and Time:  02/21/2018 11:04 AM    Attending Physician:DREW PETTY    Reason for Admission: intractable abdominal pain    Procedures Performed: * No surgery found *    Hospital Course (synopsis of major diagnoses, care, treatment, and services provided during the course of the hospital stay):     67yoF with history of metastatic NET s/p open cholecystectomy in January presents with two day history of worsening abdominal pain and was admitted for intractable abdominal pain. IV fluids, Iv pain medications were started and patient was made NPO. CT ab and US Ab wnl. Pain was thought to be more consistent with her chronic low back pain and not associated with post-op complications or new abdominal issues. Lidocaine patch was started and pain improved.   Diet was advanced to adult regular, which patient tolerated without pain or n/v. She continued to c/o loose stools about 3 times per day but this was an improvement of previous 5-6 times per day as per her usually home regimen.  Cholestryramine was admitted to take TID before meals to help with BMs.     Also found to have UTI which was treated with IV rocephin x 1 and patient sent out on macrobid to complete 5 day course.     Patient was hemodynamically stable and deemed appropriate for discharge. Patient recommended to follow up with PCP and pain medicine doctor for pain control of low back pain.     Significant Diagnostic Studies:   CT abd/pelvis:   There is bilateral nonobstructing nephrolithiasis at the lower pole of both kidneys. No obstructive uropathy is seen.  Postsurgical changes of cholecystectomy are identified with interval near-complete resolution of the mixed fluid density in the cholecystectomy bed. There is a residual small focus of air within the resection cavity, markedly improved.  Postoperative changes of partial colectomy are identified. There is  liquid stool in the colon suggesting diarrhea.  Focal hypodensity in the periphery of the right hepatic lobe, similar to the previous study a known represent metastatic disease.    US ab: Mildly dilated common bile duct proximally at 8-9 mm, without associated intrahepatic biliary ductal dilatation, which may be related to post cholecystectomy status    Final Diagnoses:    Principal Problem: Acute cystitis without hematuria   Secondary Diagnoses:   Active Hospital Problems    Diagnosis  POA    *Acute cystitis without hematuria [N30.00]  Yes    Low back pain [M54.5]  Yes    Intractable abdominal pain [R10.9]  Yes      Resolved Hospital Problems    Diagnosis Date Resolved POA   No resolved problems to display.       Discharged Condition: good    Disposition: Home or Self Care    Follow Up/Patient Instructions:     Medications:  Reconciled Home Medications:   Discharge Medication List as of 2/20/2018  3:23 PM      START taking these medications    Details   nitrofurantoin, macrocrystal-monohydrate, (MACROBID) 100 MG capsule Take 1 capsule (100 mg total) by mouth every 12 (twelve) hours., Starting Tue 2/20/2018, Until Sun 2/25/2018, Normal      penicillin v potassium (VEETID) 500 MG tablet Take 1 tablet (500 mg total) by mouth 4 (four) times daily., Starting Tue 2/20/2018, Until Fri 3/2/2018, Normal         CONTINUE these medications which have CHANGED    Details   cholestyramine (QUESTRAN) 4 gram packet Take 1 packet (4 g total) by mouth 3 (three) times daily with meals., Starting Tue 2/20/2018, Until Wed 2/20/2019, Print      lidocaine (LIDODERM) 5 % Place 1 patch onto the skin once daily. Remove & Discard patch within 12 hours or as directed by MD, Starting Tue 2/20/2018, Print         CONTINUE these medications which have NOT CHANGED    Details   cyanocobalamin 1,000 mcg/mL injection 1,000 mcg every 30 days. , Starting 3/14/2016, Until Discontinued, Historical Med      diphenoxylate-atropine 2.5-0.025 mg  (LOMOTIL) 2.5-0.025 mg per tablet Take 1 tablet by mouth 4 (four) times daily as needed for Diarrhea., Historical Med      ergocalciferol (VITAMIN D2) 50,000 unit Cap Take 1 capsule (50,000 Units total) by mouth every 7 days., Starting 7/14/2016, Until Discontinued, Normal      gabapentin (NEURONTIN) 100 MG capsule Take 100 mg by mouth 2 (two) times daily., Historical Med      lanreotide (SOMATULINE DEPOT) 120 mg/0.5 mL Syrg Inject 120 mg into the skin every 28 days., Historical Med      losartan (COZAAR) 50 MG tablet once daily. , Starting Sun 9/17/2017, Historical Med      metoprolol tartrate (LOPRESSOR) 50 MG tablet Take 50 mg by mouth 2 (two) times daily., Historical Med      oxyCODONE (ROXICODONE) 30 MG Tab Starting Thu 1/25/2018, Historical Med      promethazine (PHENERGAN) 25 MG tablet Take 1 tablet (25 mg total) by mouth every 6 (six) hours as needed for Nausea., Starting Thu 1/18/2018, Normal      zolpidem (AMBIEN) 5 MG Tab Take 1 tablet (5 mg total) by mouth nightly as needed., Starting Wed 1/3/2018, Normal      atenolol-chlorthalidone (TENORETIC) 50-25 mg Tab TAKE 1 TABLET TWICE DAILY, Historical Med      ferrous gluconate (FERGON) 324 MG tablet TAKE 1 TABLET EVERY DAY WITH BREAKFAST, Normal      !! lifitegrast (XIIDRA) 5 % Dpet Xiidra 5 % eye drops in a dropperette, Historical Med      nystatin (MYCOSTATIN) 100,000 unit/mL suspension SWISH AND SWALLOW 1 TEASPOON BY MOUTH 4 TIMES A DAY. ONCE RELIEVED CONTINUE FOR 48 HOURS, Historical Med      prednisoLONE acetate (PRED FORTE) 1 % DrpS prednisolone acetate 1 % eye drops,suspension, Historical Med      !! XIIDRA 5 % Dpet Starting Thu 7/27/2017, Historical Med       !! - Potential duplicate medications found. Please discuss with provider.          Discharge Procedure Orders  Diet Adult Regular     Activity as tolerated     Notify your health care provider if you experience any of the following:  temperature >100.4     Notify your health care provider if you  experience any of the following:  persistent nausea and vomiting or diarrhea     Notify your health care provider if you experience any of the following:  severe uncontrolled pain     Notify your health care provider if you experience any of the following:  redness, tenderness, or signs of infection (pain, swelling, redness, odor or green/yellow discharge around incision site)       Follow-up Information     Magdalena Hernandez MD.    Specialty:  Family Medicine  Contact information:  [address]  [address]  [place] [address]  [phone]             Chris Herbert MD. Call in 1 week.    Specialty:  General Surgery  Why:  As needed  Contact information:  [address] [address] [facility] [address]  [address]  [place] [address]  [phone]             CHAIR [address] [place] [address] On 2/23/2018.    Specialties:  Hematology, Oncology  Why:  11:30 am                  Dana Morris D.O.  \[facility]\"" Family Medicine HO-2  02/21/2018

## 2018-02-22 RX ORDER — GABAPENTIN 100 MG/1
CAPSULE ORAL
Qty: 120 CAPSULE | Refills: 6 | Status: SHIPPED | OUTPATIENT
Start: 2018-02-22 | End: 2018-09-19 | Stop reason: SDUPTHER

## 2018-02-26 RX ORDER — LANREOTIDE ACETATE 120 MG/.5ML
120 INJECTION SUBCUTANEOUS
Status: CANCELLED | OUTPATIENT
Start: 2018-02-26

## 2018-02-28 ENCOUNTER — INFUSION (OUTPATIENT)
Dept: INFUSION THERAPY | Facility: HOSPITAL | Age: 68
End: 2018-02-28
Attending: SURGERY
Payer: MEDICARE

## 2018-02-28 ENCOUNTER — TELEPHONE (OUTPATIENT)
Dept: FAMILY MEDICINE | Facility: CLINIC | Age: 68
End: 2018-02-28

## 2018-02-28 VITALS — BODY MASS INDEX: 22.82 KG/M2 | HEIGHT: 66 IN | WEIGHT: 142 LBS

## 2018-02-28 DIAGNOSIS — C7B.8 SECONDARY NEUROENDOCRINE TUMOR OF LIVER: ICD-10-CM

## 2018-02-28 DIAGNOSIS — C7A.8 NEUROENDOCRINE CARCINOMA, UNKNOWN PRIMARY SITE: ICD-10-CM

## 2018-02-28 DIAGNOSIS — C7B.8 SECONDARY NEUROENDOCRINE TUMOR OF DISTANT LYMPH NODES: Primary | ICD-10-CM

## 2018-02-28 PROCEDURE — 96372 THER/PROPH/DIAG INJ SC/IM: CPT

## 2018-02-28 PROCEDURE — 63600175 PHARM REV CODE 636 W HCPCS: Mod: JG | Performed by: SURGERY

## 2018-02-28 RX ORDER — LANREOTIDE ACETATE 120 MG/.5ML
120 INJECTION SUBCUTANEOUS
Status: CANCELLED | OUTPATIENT
Start: 2018-02-28

## 2018-02-28 RX ORDER — LANREOTIDE ACETATE 120 MG/.5ML
120 INJECTION SUBCUTANEOUS
Status: DISCONTINUED | OUTPATIENT
Start: 2018-02-28 | End: 2018-02-28 | Stop reason: HOSPADM

## 2018-02-28 RX ADMIN — LANREOTIDE ACETATE 120 MG: 120 INJECTION SUBCUTANEOUS at 10:02

## 2018-02-28 NOTE — TELEPHONE ENCOUNTER
----- Message from Lilibeth Flowers sent at 2/28/2018  3:12 PM CST -----  Cally with [facility] called.   [phone]    Patient does not want home health any longer.  She will be discharged.

## 2018-03-08 RX ORDER — LOSARTAN POTASSIUM 50 MG/1
TABLET ORAL
Qty: 30 TABLET | Refills: 2 | Status: SHIPPED | OUTPATIENT
Start: 2018-03-08 | End: 2018-06-03 | Stop reason: SDUPTHER

## 2018-03-09 ENCOUNTER — OFFICE VISIT (OUTPATIENT)
Dept: FAMILY MEDICINE | Facility: CLINIC | Age: 68
End: 2018-03-09
Payer: MEDICARE

## 2018-03-09 VITALS
BODY MASS INDEX: 24.08 KG/M2 | HEART RATE: 49 BPM | SYSTOLIC BLOOD PRESSURE: 126 MMHG | WEIGHT: 149.81 LBS | TEMPERATURE: 98 F | OXYGEN SATURATION: 99 % | RESPIRATION RATE: 18 BRPM | DIASTOLIC BLOOD PRESSURE: 60 MMHG | HEIGHT: 66 IN

## 2018-03-09 DIAGNOSIS — D50.9 IRON DEFICIENCY ANEMIA, UNSPECIFIED IRON DEFICIENCY ANEMIA TYPE: ICD-10-CM

## 2018-03-09 DIAGNOSIS — E34.0 CARCINOID SYNDROME: ICD-10-CM

## 2018-03-09 DIAGNOSIS — R11.0 NAUSEA: ICD-10-CM

## 2018-03-09 DIAGNOSIS — I10 ESSENTIAL HYPERTENSION: Primary | ICD-10-CM

## 2018-03-09 DIAGNOSIS — E55.9 VITAMIN D DEFICIENCY: ICD-10-CM

## 2018-03-09 DIAGNOSIS — M54.50 ACUTE MIDLINE LOW BACK PAIN WITHOUT SCIATICA: ICD-10-CM

## 2018-03-09 DIAGNOSIS — S30.0XXA CONTUSION OF LOWER BACK, INITIAL ENCOUNTER: ICD-10-CM

## 2018-03-09 PROCEDURE — 99214 OFFICE O/P EST MOD 30 MIN: CPT | Mod: PBBFAC,PN | Performed by: FAMILY MEDICINE

## 2018-03-09 PROCEDURE — 99999 PR PBB SHADOW E&M-EST. PATIENT-LVL IV: CPT | Mod: PBBFAC,,, | Performed by: FAMILY MEDICINE

## 2018-03-09 PROCEDURE — 99214 OFFICE O/P EST MOD 30 MIN: CPT | Mod: S$PBB,,, | Performed by: FAMILY MEDICINE

## 2018-03-09 RX ORDER — ONDANSETRON 4 MG/1
4 TABLET, FILM COATED ORAL EVERY 6 HOURS PRN
Qty: 60 TABLET | Refills: 2 | Status: SHIPPED | OUTPATIENT
Start: 2018-03-09 | End: 2018-07-24

## 2018-03-09 RX ORDER — FERROUS GLUCONATE 324(38)MG
TABLET ORAL
Qty: 30 TABLET | Refills: 2 | Status: SHIPPED | OUTPATIENT
Start: 2018-03-09 | End: 2018-07-09

## 2018-03-09 NOTE — PROGRESS NOTES
HPI:  Patito Domingo is a 67 y.o. year old female that  presents for f/u after a fall 3 weeks ago. She did not get any xrays done. She describes the pain as severe at her tail bone. She took her regular pain medication that she takes but it  Has not been helping.She has bee taking phenergan along with Zofran which is probably cause ing her dizziness. She got the phenergan from her last hospital stay.  She has been taking her injections fro the carcinoid tumor. She has had 3.  Chief Complaint   Patient presents with    Fall     pt states her tailbone is hurting    Medication Refill     Zofran, fergon   .     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)      Social History     Social History    Marital status:      Spouse name: N/A    Number of children: N/A    Years of education: N/A     Occupational History    disabled       Social History Main Topics    Smoking status: Never Smoker    Smokeless tobacco: Never Used    Alcohol use No    Drug use: No    Sexual activity: Not Currently     Other Topics Concern    Not on file     Social History Narrative    No narrative on file     Past Surgical History:   Procedure Laterality Date    CATARACT EXTRACTION Left 10/2017    CHOLECYSTECTOMY      COLON SURGERY      cystoscope      EYE SURGERY      HYSTERECTOMY  5/1996    LITHOTRIPSY      LIVER BIOPSY  9/14    carcinoid     Family History   Problem Relation Age of Onset    Cancer Mother      unknown    Alzheimer's disease Father     Stroke Sister     No Known Problems Son     No Known Problems Son     No Known Problems Son     No Known Problems Son            Review of Systems  General ROS: negative for chills, fever or weight loss  ENT ROS: negative for epistaxis,  "sore throat or rhinorrhea  Respiratory ROS: no cough, shortness of breath, or wheezing  Cardiovascular ROS: no chest pain or dyspnea on exertion  Gastrointestinal ROS: no abdominal pain, change in bowel habits, or black/ bloody stools  Musculoskeletal ROS: Pain on the bottom of her spine    Physical Exam:  /60 (BP Location: Right arm, Patient Position: Sitting, BP Method: Medium (Manual))   Pulse (!) 49   Temp 97.5 °F (36.4 °C) (Oral)   Resp 18   Ht 5' 6" (1.676 m)   Wt 67.9 kg (149 lb 12.8 oz)   SpO2 99%   BMI 24.18 kg/m²   General appearance: alert, cooperative, no distress  Constitutional:Oriented to person, place, and time.appears well-developed and well-nourished.  HEENT: Normocephalic, atraumatic, neck symmetrical, no nasal discharge, TM- clear bilaterally  Lungs: clear to auscultation bilaterally, no dullness to percussion bilaterally  Heart: regular rate and rhythm without rub; no displacement of the PMI , S1&S2 present  Abdomen: soft, non-tender; bowel sounds normoactive; no organomegaly  Musculoskeletal: Positive tenderness of the sacral coccyx region with noted large ecchymosis covering this area between the buttocks.  Physical Exam    LABS:    Complete Blood Count  Lab Results   Component Value Date    RBC 3.91 (L) 02/18/2018    HGB 10.7 (L) 02/18/2018    HCT 34.1 (L) 02/18/2018    MCV 87 02/18/2018    MCH 27.4 02/18/2018    MCHC 31.4 (L) 02/18/2018    RDW 14.0 02/18/2018     02/18/2018    MPV 10.2 02/18/2018    GRAN 4.0 02/18/2018    GRAN 73.9 (H) 02/18/2018    LYMPH 1.0 02/18/2018    LYMPH 17.6 (L) 02/18/2018    MONO 0.4 02/18/2018    MONO 7.7 02/18/2018    EOS 0.0 02/18/2018    BASO 0.01 02/18/2018    EOSINOPHIL 0.4 02/18/2018    BASOPHIL 0.2 02/18/2018    DIFFMETHOD Automated 02/18/2018       Comprehensive Metabolic Panel  Lab Results   Component Value Date     02/18/2018    BUN 15 02/18/2018    CREATININE 0.8 02/18/2018     02/18/2018    K 3.5 02/18/2018     " 02/18/2018    PROT 7.9 02/18/2018    ALBUMIN 3.8 02/18/2018    BILITOT 0.8 02/18/2018    AST 12 02/18/2018    ALKPHOS 84 02/18/2018    CO2 23 02/18/2018    ALT 5 (L) 02/18/2018    ANIONGAP 15 02/18/2018    EGFRNONAA >60 02/18/2018    ESTGFRAFRICA >60 02/18/2018       LIPID  Lab Results   Component Value Date    CHOL 143 08/30/2011    HDL 59 08/30/2011         TSH  Lab Results   Component Value Date    TSH 0.896 03/14/2016       Current Outpatient Prescriptions   Medication Sig Dispense Refill    cholestyramine (QUESTRAN) 4 gram packet Take 1 packet (4 g total) by mouth 3 (three) times daily with meals. 90 packet 11    cyanocobalamin 1,000 mcg/mL injection 1,000 mcg every 30 days.   0    diphenoxylate-atropine 2.5-0.025 mg (LOMOTIL) 2.5-0.025 mg per tablet Take 1 tablet by mouth 4 (four) times daily as needed for Diarrhea.      ergocalciferol (VITAMIN D2) 50,000 unit Cap Take 1 capsule (50,000 Units total) by mouth every 7 days. 12 capsule 4    gabapentin (NEURONTIN) 100 MG capsule TAKE 2 CAPSULES TWICE A  capsule 6    lanreotide (SOMATULINE DEPOT) 120 mg/0.5 mL Syrg Inject 120 mg into the skin every 28 days.      lifitegrast (XIIDRA) 5 % Dpet Xiidra 5 % eye drops in a dropperette      losartan (COZAAR) 50 MG tablet TAKE 1 TABLET BY MOUTH DAILY 30 tablet 2    metoprolol tartrate (LOPRESSOR) 50 MG tablet Take 50 mg by mouth 2 (two) times daily.      nystatin (MYCOSTATIN) 100,000 unit/mL suspension SWISH AND SWALLOW 1 TEASPOON BY MOUTH 4 TIMES A DAY. ONCE RELIEVED CONTINUE FOR 48 HOURS      oxyCODONE (ROXICODONE) 30 MG Tab       prednisoLONE acetate (PRED FORTE) 1 % DrpS prednisolone acetate 1 % eye drops,suspension      XIIDRA 5 % Dpet       ferrous gluconate (FERGON) 324 MG tablet TAKE 1 TABLET EVERY DAY WITH BREAKFAST 30 tablet 2    ondansetron (ZOFRAN) 4 MG tablet Take 1 tablet (4 mg total) by mouth every 6 (six) hours as needed for Nausea. 60 tablet 2     No current facility-administered  medications for this visit.        Assessment:    ICD-10-CM ICD-9-CM    1. Essential hypertension I10 401.9    2. Iron deficiency anemia, unspecified iron deficiency anemia type D50.9 280.9 ferrous gluconate (FERGON) 324 MG tablet   3. Carcinoid syndrome E34.0 259.2 ondansetron (ZOFRAN) 4 MG tablet   4. Nausea R11.0 787.02    5. Acute midline low back pain without sciatica M54.5 724.2 X-Ray Sacroiliac Joints PA And Oblique   6. Vitamin D deficiency E55.9 268.9    7. Contusion of lower back, initial encounter S30.0XXA 922.31 X-Ray Sacroiliac Joints PA And Oblique         Plan:    Follow-up in 2 weeks (on 3/23/2018).          Magdalena Hernandez MD

## 2018-03-14 ENCOUNTER — TELEPHONE (OUTPATIENT)
Dept: FAMILY MEDICINE | Facility: CLINIC | Age: 68
End: 2018-03-14

## 2018-03-14 NOTE — TELEPHONE ENCOUNTER
----- Message from Magdalena Hernandez MD sent at 3/13/2018  5:27 PM CDT -----  Tell pt to start taking Vitamin D with calcium OTC daily. Vitamin D level is improving but she will need a daily supplement.

## 2018-03-20 ENCOUNTER — HOSPITAL ENCOUNTER (EMERGENCY)
Facility: HOSPITAL | Age: 68
Discharge: HOME | End: 2018-03-20
Attending: EMERGENCY MEDICINE
Payer: MEDICARE

## 2018-03-20 VITALS
BODY MASS INDEX: 22.5 KG/M2 | WEIGHT: 140 LBS | DIASTOLIC BLOOD PRESSURE: 78 MMHG | RESPIRATION RATE: 14 BRPM | HEART RATE: 68 BPM | HEIGHT: 66 IN | SYSTOLIC BLOOD PRESSURE: 136 MMHG | TEMPERATURE: 99 F | OXYGEN SATURATION: 98 %

## 2018-03-20 DIAGNOSIS — D3A.00 CARCINOID TUMOR: ICD-10-CM

## 2018-03-20 DIAGNOSIS — M54.9 BACK PAIN, UNSPECIFIED BACK LOCATION, UNSPECIFIED BACK PAIN LATERALITY, UNSPECIFIED CHRONICITY: Primary | ICD-10-CM

## 2018-03-20 LAB
ALBUMIN SERPL BCP-MCNC: 3.3 G/DL
ALP SERPL-CCNC: 72 U/L
ALT SERPL W/O P-5'-P-CCNC: 8 U/L
AMYLASE SERPL-CCNC: 31 U/L
ANION GAP SERPL CALC-SCNC: 6 MMOL/L
AST SERPL-CCNC: 13 U/L
BASOPHILS # BLD AUTO: 0.02 K/UL
BASOPHILS NFR BLD: 0.4 %
BILIRUB SERPL-MCNC: 0.6 MG/DL
BUN SERPL-MCNC: 13 MG/DL
CALCIUM SERPL-MCNC: 9.4 MG/DL
CHLORIDE SERPL-SCNC: 106 MMOL/L
CO2 SERPL-SCNC: 27 MMOL/L
CREAT SERPL-MCNC: 1 MG/DL
DIFFERENTIAL METHOD: ABNORMAL
EOSINOPHIL # BLD AUTO: 0 K/UL
EOSINOPHIL NFR BLD: 0.2 %
ERYTHROCYTE [DISTWIDTH] IN BLOOD BY AUTOMATED COUNT: 14.3 %
EST. GFR  (AFRICAN AMERICAN): >60 ML/MIN/1.73 M^2
EST. GFR  (NON AFRICAN AMERICAN): 59 ML/MIN/1.73 M^2
GLUCOSE SERPL-MCNC: 116 MG/DL
HCT VFR BLD AUTO: 32.6 %
HGB BLD-MCNC: 10.2 G/DL
LIPASE SERPL-CCNC: 10 U/L
LYMPHOCYTES # BLD AUTO: 1 K/UL
LYMPHOCYTES NFR BLD: 21.3 %
MAGNESIUM SERPL-MCNC: 1.7 MG/DL
MCH RBC QN AUTO: 27.1 PG
MCHC RBC AUTO-ENTMCNC: 31.3 G/DL
MCV RBC AUTO: 87 FL
MONOCYTES # BLD AUTO: 0.4 K/UL
MONOCYTES NFR BLD: 8.2 %
NEUTROPHILS # BLD AUTO: 3.4 K/UL
NEUTROPHILS NFR BLD: 69.7 %
PLATELET # BLD AUTO: 244 K/UL
PMV BLD AUTO: 9.7 FL
POTASSIUM SERPL-SCNC: 3.7 MMOL/L
PROT SERPL-MCNC: 6.6 G/DL
RBC # BLD AUTO: 3.76 M/UL
SODIUM SERPL-SCNC: 139 MMOL/L
WBC # BLD AUTO: 4.89 K/UL

## 2018-03-20 PROCEDURE — 25000003 PHARM REV CODE 250: Performed by: EMERGENCY MEDICINE

## 2018-03-20 PROCEDURE — 96361 HYDRATE IV INFUSION ADD-ON: CPT

## 2018-03-20 PROCEDURE — 96376 TX/PRO/DX INJ SAME DRUG ADON: CPT

## 2018-03-20 PROCEDURE — 83735 ASSAY OF MAGNESIUM: CPT

## 2018-03-20 PROCEDURE — 63600175 PHARM REV CODE 636 W HCPCS: Performed by: EMERGENCY MEDICINE

## 2018-03-20 PROCEDURE — 83690 ASSAY OF LIPASE: CPT

## 2018-03-20 PROCEDURE — 80053 COMPREHEN METABOLIC PANEL: CPT

## 2018-03-20 PROCEDURE — 99284 EMERGENCY DEPT VISIT MOD MDM: CPT | Mod: 25

## 2018-03-20 PROCEDURE — 96374 THER/PROPH/DIAG INJ IV PUSH: CPT

## 2018-03-20 PROCEDURE — 82150 ASSAY OF AMYLASE: CPT

## 2018-03-20 PROCEDURE — 85025 COMPLETE CBC W/AUTO DIFF WBC: CPT

## 2018-03-20 RX ORDER — DIPHENOXYLATE HYDROCHLORIDE AND ATROPINE SULFATE 2.5; .025 MG/1; MG/1
1 TABLET ORAL
Status: COMPLETED | OUTPATIENT
Start: 2018-03-20 | End: 2018-03-20

## 2018-03-20 RX ORDER — HYDROMORPHONE HYDROCHLORIDE 2 MG/ML
1 INJECTION, SOLUTION INTRAMUSCULAR; INTRAVENOUS; SUBCUTANEOUS
Status: COMPLETED | OUTPATIENT
Start: 2018-03-20 | End: 2018-03-20

## 2018-03-20 RX ADMIN — HYDROMORPHONE HYDROCHLORIDE 1 MG: 2 INJECTION INTRAMUSCULAR; INTRAVENOUS; SUBCUTANEOUS at 10:03

## 2018-03-20 RX ADMIN — SODIUM CHLORIDE 1000 ML: 0.9 INJECTION, SOLUTION INTRAVENOUS at 07:03

## 2018-03-20 RX ADMIN — HYDROMORPHONE HYDROCHLORIDE 1 MG: 2 INJECTION INTRAMUSCULAR; INTRAVENOUS; SUBCUTANEOUS at 07:03

## 2018-03-20 RX ADMIN — DIPHENOXYLATE HYDROCHLORIDE AND ATROPINE SULFATE 1 TABLET: 2.5; .025 TABLET ORAL at 10:03

## 2018-03-20 NOTE — ED NOTES
History of liver CA, currently undergoing chemotherapy once a month.   APPEARANCE: Alert, oriented and in no acute distress.  CARDIAC: Normal rate and rhythm, no murmur heard.   PERIPHERAL VASCULAR: peripheral pulses present. Normal cap refill. No edema. Warm to touch.    RESPIRATORY:Normal rate and effort, breath sounds clear bilaterally throughout chest. Respirations are equal and unlabored no obvious signs of distress.  GASTRO: soft, bowel sounds normal, no tenderness, no abdominal distention.  MUSC: Full ROM.patient complains of chronic lower back pain. . No obvious deformity.  SKIN: Skin is warm and dry, normal skin turgor, mucous membranes moist.  NEURO: 5/5 strength major flexors/extensors bilaterally. Sensory intact to light touch bilaterally. Kylie coma scale: eyes open spontaneously-4, oriented & converses-5, obeys commands-6. No neurological abnormalities.   MENTAL STATUS: awake, alert and aware of environment.  EYE: PERRL, both eyes: pupils brisk and reactive to light. Normal size.  ENT: EARS: no obvious drainage. NOSE: no active bleeding.

## 2018-03-20 NOTE — ED PROVIDER NOTES
Encounter Date: 3/20/2018    SCRIBE #1 NOTE: I, Lorena Vigil, am scribing for, and in the presence of,  Dr. Robbins. I have scribed the entire note.       History     Chief Complaint   Patient presents with    Back Pain     Hx liver carcinoma, taking chemo shots. complains of lower backa nd bilateral extremity pain.     Time seen by provider: 7:17 AM    This is a 67 y.o. female who presents with complaint of low back pain. She reports onset of symptoms was 5 days ago. The patient states the pain has been constant. She describes the pain as aching. The patient notes associated pain in the bilateral legs but denies any numbness, tingling or weakness in the legs, loss of bowel/bladder control or genital numbness. She denies any recent trauma or injury to the back. The patient reports the pain worsens with movement. She has been taking Oxycodone with no improvement in pain. She also notes nausea and vomiting. Of note the patient has liver carcinoma. She was evaluated for the pain yesterday at [facility]. At which time she had imaging, labs and urine testing with no acute findings.       The history is provided by the patient.     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)      Past Surgical History:   Procedure Laterality Date    CATARACT EXTRACTION Left 10/2017    CHOLECYSTECTOMY      COLON SURGERY      cystoscope      EYE SURGERY      HYSTERECTOMY  5/1996    LITHOTRIPSY       LIVER BIOPSY  9/14    carcinoid     Family History   Problem Relation Age of Onset    Cancer Mother      unknown    Alzheimer's disease Father     Stroke Sister     No Known Problems Son     No Known Problems Son     No Known Problems Son     No Known Problems Son      Social History   Substance Use Topics    Smoking status: Never Smoker    Smokeless tobacco: Never Used    Alcohol use No     Review of Systems   Constitutional: Negative for chills and fever.   HENT: Negative for congestion, rhinorrhea and sore throat.    Eyes: Negative for redness and visual disturbance.   Respiratory: Negative for cough, shortness of breath and wheezing.    Cardiovascular: Negative for chest pain and palpitations.   Gastrointestinal: Positive for nausea and vomiting. Negative for abdominal pain and diarrhea.   Genitourinary: Negative for dysuria and hematuria.   Musculoskeletal: Positive for back pain (low). Negative for myalgias and neck pain.        Bilateral leg pain   Skin: Negative for rash.   Neurological: Negative for dizziness, weakness and light-headedness.   Psychiatric/Behavioral: Negative for confusion.       Physical Exam     Initial Vitals [03/20/18 0656]   BP Pulse Resp Temp SpO2   129/79 101 20 98.7 °F (37.1 °C) 99 %      MAP       95.67         Physical Exam    Nursing note and vitals reviewed.  Constitutional: She appears well-developed and well-nourished. She is not diaphoretic. No distress.   HENT:   Head: Normocephalic and atraumatic.   Dry mucus membranes   Eyes: Conjunctivae and EOM are normal.   Neck: Normal range of motion. Neck supple.   Cardiovascular: Normal rate, regular rhythm and normal heart sounds. Exam reveals no gallop and no friction rub.    No murmur heard.  Pulmonary/Chest: Breath sounds normal. She has no wheezes. She has no rhonchi. She has no rales.   Abdominal: Soft. There is tenderness. There is no rebound and no guarding.   Diffuse abdominal tenderness   Musculoskeletal: Normal  range of motion. She exhibits no edema or tenderness.   No LE pitting edema.    Lymphadenopathy:     She has no cervical adenopathy.   Neurological: She is alert and oriented to person, place, and time. She has normal strength.   Skin: Skin is warm and dry. No rash noted.         ED Course   Procedures  Labs Reviewed   CBC W/ AUTO DIFFERENTIAL - Abnormal; Notable for the following:        Result Value    RBC 3.76 (*)     Hemoglobin 10.2 (*)     Hematocrit 32.6 (*)     MCHC 31.3 (*)     All other components within normal limits   COMPREHENSIVE METABOLIC PANEL - Abnormal; Notable for the following:     Glucose 116 (*)     Albumin 3.3 (*)     ALT 8 (*)     Anion Gap 6 (*)     eGFR if non  59 (*)     All other components within normal limits   AMYLASE   LIPASE   MAGNESIUM   URINALYSIS           Medical Decision Making:   Clinical Tests:   Lab Tests: Ordered and Reviewed  ED Management:  9:59 AM Case discussed with Dr. Abad, states he has admitted the patient in the past for the same and states the patient can be discharged home.                       Clinical Impression:     1. Back pain, unspecified back location, unspecified back pain laterality, unspecified chronicity    2. Carcinoid tumor        Disposition:   Disposition: Discharged  Condition: Stable    I, Dr. Brock Pimentel, personally performed the services described in this documentation. All medical record entries made by the scribe were at my direction and in my presence. I have reviewed the chart and agree that the record reflects my personal performance and is accurate and complete. Brock Pimentel MD.  1:51 PM 03/20/2018                     Brock Pimentel MD  03/20/18 5366

## 2018-03-21 NOTE — TELEPHONE ENCOUNTER
Patient was seen in the ED on Monday and Tuesday for diarrhea, falls and back pain. Patient states she fell three times going to the bathroom. Patient states she usually take Tramadol for back pain but she doesn't have any more and would like refill.

## 2018-03-21 NOTE — TELEPHONE ENCOUNTER
----- Message from Stephanie Diehl sent at 3/21/2018  3:11 PM CDT -----  Contact: [phone]/self   Pt requesting to speak with you concerning severe back pain.   Please call and advise

## 2018-03-22 ENCOUNTER — OUTPATIENT CASE MANAGEMENT (OUTPATIENT)
Dept: ADMINISTRATIVE | Facility: OTHER | Age: 68
End: 2018-03-22

## 2018-03-22 RX ORDER — TRAMADOL HYDROCHLORIDE 50 MG/1
50 TABLET ORAL EVERY 8 HOURS PRN
Qty: 30 TABLET | Refills: 2 | Status: SHIPPED | OUTPATIENT
Start: 2018-03-22 | End: 2018-04-01

## 2018-03-22 NOTE — PROGRESS NOTES
The following patient has been assigned to Lorena Durand RN with Outpatient Complex Care Management for high risk screening.    Reason: High Risk : Recently discharged      Please contact OPCM at [phone] with any questions.    Thank you,    Katalina Gore, [facility]  Outpatient Complex Care Mgmt  Ext [phone]

## 2018-03-22 NOTE — TELEPHONE ENCOUNTER
----- Message from Dorie Magana PharmD sent at 3/22/2018  4:20 PM CDT -----  Hi Dr. Hernandez,    I spoke to your patient Patito Domingo today in regards to medication adherence for her metoprolol tartrate 50 mg BID to manage her blood pressure. Ms. Domingo is interested in being converted over to a 90-day supply for this medication. In speaking to her pharmacy, she is out of refills on this medication. Is it possible to send her pharmacy ([facility] in [place], [place]) a new prescription for a 90-day supply on the metoprolol tartrate 50 mg?    Kind regards,  Dorie Magana PharmD  Ambulatory Care Clinical Pharmacist-Population Health  [phone]

## 2018-03-27 ENCOUNTER — OUTPATIENT CASE MANAGEMENT (OUTPATIENT)
Dept: ADMINISTRATIVE | Facility: OTHER | Age: 68
End: 2018-03-27

## 2018-03-27 RX ORDER — METOPROLOL TARTRATE 50 MG/1
50 TABLET ORAL 2 TIMES DAILY
Qty: 180 TABLET | Refills: 0 | Status: SHIPPED | OUTPATIENT
Start: 2018-03-27 | End: 2018-09-19 | Stop reason: SDUPTHER

## 2018-03-27 NOTE — PROGRESS NOTES
First attempt to perform initial assessment for OCPM; left voice message to return call.  ENA Durand, OPCM-RN

## 2018-03-29 ENCOUNTER — OUTPATIENT CASE MANAGEMENT (OUTPATIENT)
Dept: ADMINISTRATIVE | Facility: OTHER | Age: 68
End: 2018-03-29

## 2018-03-29 NOTE — LETTER
March 29, 2018    Patito Merchantmijossy  [address]  [place] [address]             [facility] [address] [place] [address] Dear Patito,    I work with [facility]'s Outpatient Case Management Department. I have been unsuccessful at reaching you to follow-up to see how you have been doing. If you require any future assistance or if any new concerns or problems arise, please do not hesitate to call.     The Outpatient Case Management Department can be reached at [phone] from 8:00AM to 4:30 PM on Monday thru Friday. [facility] also has a program where a nurse is available 24/7 to answer questions or provide medical advice, their number is [phone].    Thanks,    Lorena Durand,  RN  Outpatient Case Management

## 2018-03-29 NOTE — PROGRESS NOTES
2 nd attempt to complete initial assessment for OPCM; left message requesting a return call.  As this is my second unsuccessful attempt to complete initial assessment for OPCM, I will mail a letter with my contact information.  ENA Durand OPCM-RN

## 2018-04-05 ENCOUNTER — OUTPATIENT CASE MANAGEMENT (OUTPATIENT)
Dept: ADMINISTRATIVE | Facility: OTHER | Age: 68
End: 2018-04-05

## 2018-04-05 NOTE — PROGRESS NOTES
"Talked to patient to perform initial assessment for OPCM.  Patient currently living with her son and patient is primary caregiver of self.  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  Patient was recently hospitalized in ED ([facility]) on 3/19 for acute cystitis and on 3/20 for back pain and bilateral extremity pain.  Medication reconciliation performed and is taking all medications as prescribed.  PHQ2 performed and score of 1 obtained.  Patient stated that she has fallen twice within the past year and one of the falls she did skin her head on her end table in her bedroom.  Patient stated that she does use a walker to assist with ambulation and mobility.  Patient stated that she has been experiencing some nausea.  Patient stated that she does monitor her B/P daily and stated that today's pressure "was normal" and was unable to tell me since her log was in other room.  Educated patient importance of calling MD if B/P is greater than 140/90mmHg x 3 days and verbalized understanding.  Patient stated that she is needing assistance with food and also with transportation.  I gave patient information over the phone about [employer].  Currently she is using [facility] but is requesting for assistance with transportation.  Reminded patient about upcoming appointment:  4/6 at 1030 for chemo and verbalized understanding.  Encouraged patient to call this RN if having any questions/concerns.   I will mail my contact information should any new problems/concerns arise in the future.  I will mail literature about [facility].  I will mail educational materials regarding:  Advance Directives, UTI, HTN, Immunocompromise and Chemo.  Will consult SASHA Lockett OPCM-MARIANO for patient's request for assistance with obtaining food and for assistance " with transportation.  Will admit to OPCM.  Will continue to follow.  LEXII Manning-RN    Interventions:  -Educated patient importance of monitoring B/P and notify MD if B/P is > 140/90mmHg x 3 days.  -Supplied patient information regarding [employer] for assistance with food.  -Consulted OPCM- for coordination of care and per patient's request assistance with food and transportation.    Plan:  -Continue to educate about liver CA. Review signs and symptoms of CHF flare up. Encourage patient to follow medication and treatment regimen. Encourage patient to maintain follow up with doctors.   -Continue to educate about fall prevention and safety in home setting. Encourage patient to follow medication and treatment regimen. Encourage patient to maintain follow up with doctors.  -Continue to educate about UTI.  Review s/s of UTI.  Encourage patient to follow medication and treatment regimen. Encourage patient to maintain follow up with doctors.  LEXII Manning-RN

## 2018-04-05 NOTE — PATIENT INSTRUCTIONS
Preventing Cancer  Many cancer cases are linked to lifestyle. Healthy lifestyle choices can help lower your risk for cancer and many other diseases. They can also improve your overall health.    Stop smoking  · Talk with your healthcare provider about aids for quitting, such as nicotine patches and some prescription medicines.  · Get help from ex-smokers.  · Create a plan for quitting.  · Pick a quit date and stick to it.  Stay at a healthy weight  · Get to and stay at a healthy weight all your life.  · If you're overweight, losing even a little weight is good for you.  Keep active  · Get regular physical activity.  · Take walks, garden, or do other activities you enjoy each day.  · Do errands on foot or bike, not by car.  · Join a walking or biking club.  · Limit the time you spend sitting to do things like watch TV, play video games, or use a computer.  Eat a healthy diet  · Eat fewer red meats and processed meats.  · Eat at least 2.5 cups of fruits and vegetables daily, especially leafy greens.  · Eat more whole grains instead of refined grain products.  · Limit alcohol to 2 drinks a day for men and 1 drink a day for women.  · Limit high-calorie foods and drinks.  · Read food labels to be more aware of calories and portion sizes.  Protect yourself from hazards  · When outside during the day, use sunscreen that is broad-spectrum and SPF 30 or greater.  · When out in sunlight, wear a hat and sunglasses.  · Seek shade in the middle of the day when the sun is hottest.  · Be aware of all hazardous products at work or in your home.  · When working with hazardous products, wear protective clothing  Talk with your provider about cancer screenings  Regular screening can help prevent some types of cancer, such as cervical and colorectal cancer. Regular screening for these cancers can find and remove abnormal areas before they become cancer. For some other types of cancer, screening may help find cancer early, when it's  small. This is when treatment is most likely to be effective. Here are some ways you can screen for certain cancers:  · Breast cancer. Breast self-awareness and mammogram.  · Skin cancer. Self-exam, professional exam, biopsy of any changes that might be cancer.  · Cervical cancer. Pap test, HPV test.  · Colorectal cancer. Screening for blood or DNA in stool, colonoscopy or other tests to look inside the colon.  · Prostate cancer. PSA blood test with or without a digital rectal exam.  · Testicular cancer. Self-exam, professional exam.  Talk with your healthcare provider about your family history and your cancer risk. Together you can decide on the cancer screening plan that's best for you.  Date Last Reviewed: 11/18/2015 © 2000-2017 [employer]. [address] [place] [address], [place] [address]. All rights reserved. This information is not intended as a substitute for professional medical care. Always follow your healthcare professional's instructions.        Resources for People with Cancer    You cant fight cancer alone. Reach out. Seek support from family, friends, and others who care about you. Let other people assist you. It can help you feel better both during and after your treatment.  Support groups  When you have cancer, support groups can be a great help. Meeting and talking with others with cancer can help you cope. There are also support groups for families of people with cancer. To find a support group, start by talking to your hospitals patient education department. Ask your healthcare provider. You can also do a search for cancer support groups on the Internet.  For more information  Contact the sources below for more information about cancer and cancer support groups:  · American Cancer Society (ACS)  107.479.9591  www.cancer.org  · National Cancer [place]  518.681.6648  www.cancer.gov  Local resources  Ask your healthcare provider about your local ACS or other support  groups:  _____________________________________________________________________  _____________________________________________________________________  _____________________________________________________________________  Date Last Reviewed: 1/6/2016  © 8868-3511 [employer]. [address] [place] [address]. All rights reserved. This information is not intended as a substitute for professional medical care. Always follow your healthcare professional's instructions.        Oncology: Controlling Nausea and Vomiting     Taken before meals, medicines can help ease nausea.    Nausea and vomiting are common side effects of chemotherapy and radiation therapy. Side effects happen when treatment changes some normal cells as well as cancer cells. In this case, the cells lining your stomach and the part of your brain that controls vomiting are affected.  Nausea is feeling that you need to throw up. Vomiting is when you actually do throw up. This is when your body forces food that is in your stomach out through your mouth.  Nausea and vomiting are common. They can be caused by many things. These include:  · Stomach flu (gastroenteritis)  · Food poisoning  · Stomach pain (gastritis)  · Blockages in the digestive system  · Constipation  · Infection  · Anxiety and stress  They can also be caused by a head injury, an infection in the brain or inside the ear, or migraines. Other common causes of nausea and vomiting include:  · Brain tumor  · Brain bruise or injury  · Motion sickness  · Alcohol, pain medicines such as morphine, and cancer medicines (chemotherapy)  · Certain medical treatments, such as radiation therapy  · Poisonous things (toxins) such as plants or liquids that are swallowed by accident  · Advanced types of cancer  · Movement problems (psychogenic problems)  Extra pressure in the fluid that surrounds the brain and spinal cord (elevated intracranial pressure)  Sometimes belly (abdominal) pain  and cramps are experienced along with nausea and vomiting. The symptoms can be mild and go away by themselves. Other symptoms can be serious and must be treated.  When to seek medical advice  Nausea and vomiting can happen before, during, or after cancer treatments. But it can be controlled. Dont consider it a normal part of cancer and cancer treatment. If not managed, it can become serious. It can change the fluid and chemical balances in your body, and could even keep you from getting cancer treatment. Call your healthcare provider right away if any of the following occur:  · You have nausea or vomiting that lasts 24 hours or more  · You cant take your antiemetics, or they are not working  · You have trouble keeping fluids down  · You become dizzy, lightheaded, or confused  · You have very dark urine or you stop urinating  Talk to your healthcare provider about your treatment and the nausea and vomiting management plan that's best for you. Be sure you know how and when to use antiemetics and when to call your provider.   Medicines can help  Nausea or vomiting can often be prevented or controlled with medicines called antiemetics. Your provider may give you antiemetics before or after treatment if you are getting chemotherapy or other treatments that cause nausea or vomiting. You may have to try different medicines or different combinations of medicines to get relief. But in nearly all cases, nausea and vomiting can be relieved.  Eating tips  · If you have medicines to control nausea, take them before meals as directed.  · Avoid fatty or greasy foods while nauseated.  · Eat small meals slowly throughout the day.  · Ask someone to sit with you while you eat to keep you from thinking about feeling nauseated.  · Eat foods at room temperature or colder to avoid strong smells.  · Eat dry foods, such as toast, crackers, or pretzels. Also eat cool, light foods, such as applesauce, and bland foods, such as oatmeal or  skinned chicken.  · Try to keep taking in clear fluids in small sips, or as ice chips, gelatin, or ice pops.  Other ways to feel better  · Get a little fresh air. Take a short walk.  · Talk to a friend, listen to music, or watch TV.  · Take a few deep, slow breaths.  · Eat by candlelight or in surroundings that you find relaxing.  · Use a method to help you relax, such as guided imagery. Imagine yourself in a beautiful, restful scene. Or daydream about the place youd most like to be.  Date Last Reviewed: 12/1/2016 © 2000-2017 [employer]. [address] [place] [address], [place] [address]. All rights reserved. This information is not intended as a substitute for professional medical care. Always follow your healthcare professional's instructions.        What is High Blood Pressure?  High blood pressure (also called hypertension) is known as the silent killer. This is because most of the time it doesnt cause symptoms. In fact, many people dont know they have it until other problems develop. In most cases, high blood pressure cant be cured. Its a disease that often requires lifelong treatment. The good news is that it can be managed.  Understanding blood pressure  The circulatory system is made up of the heart and blood vessels that carry blood through the body. Your heart is the pump for this system. With each heartbeat (contraction), the heart sends blood out through large blood vessels called arteries. Blood pressure is a measure of how hard the moving blood pushes against the walls of the arteries.  High blood pressure can harm your health  In a healthy blood vessel, the blood moves smoothly through the vessel and puts normal pressure on the vessel walls.       High blood pressure occurs when blood pushes too hard against artery walls. This causes damage to the artery walls and then the formation of scar tissue as it heals. This makes the arteries stiff and weak. Plaque sticks to the scarred tissue  "narrowing and hardening the arteries. High blood pressure also causes your heart to work harder to get blood out to the body. High blood pressure raises your risk of heart attack, also known as acute myocardial infarction, or AMI, and stroke. It can also lead to kidney disease, and blindness.  Measuring blood pressure  An example of a blood pressure measurement is 120/70 (120 over 70). The top number is the pressure of blood against the artery walls during a heartbeat (systolic). The bottom number is the pressure of blood against artery walls between heartbeats (diastolic). Talk with your healthcare provider to find out what your blood pressure goals should be.   Controlling blood pressure  If your blood pressure is too high, work with your doctor on a plan for lowering it. Below are steps you can take that will help lower your blood pressure.  · Choose heart-healthy foods. Eating healthier meals helps you control your blood pressure. Ask your doctor about the DASH eating plan. This plan helps reduce blood pressure by limiting the amount of sodium (salt) you have in your diet. DASH also encourages eating plenty of fruits and vegetables, low-fat or non-fat dairy, whole-grains, and foods high in fiber, and low in fat.  · Reduce sodium. Reducing sodium in your diet reduces fluid retention. Fluid retention caused by too much salt increases blood volume and blood pressure. The American Heart Associations (AHA) "ideal" sodium intake recommendation is 1,500 milligrams per day.  However, since American's eat so much salt, the AHA says a positive change can occur by cutting back to even 2,400 milligrams of sodium a day.  · Maintain a healthy weight. Being overweight makes you more likely to have high blood pressure. Losing excess weight helps lower blood pressure.  · Exercise regularly. Daily exercise helps your heart and blood vessels work better and stay healthier. It can help lower your blood pressure.  · Stop smoking. " Smoking increases blood pressure and damages blood vessels.  · Limit alcohol. Drinking too much alcohol can raise blood pressure. Men should have no more than 2 drinks a day. Women should have no more than 1. (A drink is equal to 1 beer, or a small glass of wine, or a shot of liquor.)  · Control stress. Stress makes your heart work harder and beat faster. Controlling stress helps you control your blood pressure.  Facts about high blood pressure  · Feeling OK does not mean that blood pressure is under control. Likewise, feeling bad doesnt mean its out of control. The only way to know for sure is to check your pressure regularly.  · Medicine is only one part of controlling high blood pressure. You also need to manage your weight, get regular exercise, and adjust your eating habits.  · High blood pressure is usually a lifelong problem. But it can be controlled with healthy lifestyle changes and medicine.  · Hypertension is not the same as stress. Although stress may be a factor in high blood pressure, its only one part of the story.  · Blood pressure medicines need to be taken every day. Stopping suddenly may cause a dangerous increase in pressure.   Date Last Reviewed: 4/27/2016  © 8798-7270 [employer]. [address] [place] [address], [place] [address]. All rights reserved. This information is not intended as a substitute for professional medical care. Always follow your healthcare professional's instructions.        Discharge Instructions for Immunocompromised Patients  You have either undergone a procedure or been diagnosed with an illness that has made you immunocompromised. This means that your immune system is very weak, making it difficult to fight off infection. The ability to fight off infection varies. It depends on your specific condition and the treatment you have. Certain cancers, cancer treatments, HIV infection, and transplant surgery are examples of things that can make you  immunocompromised. You must be very careful--even the slightest infection can carry the risk of hospitalization or death. The following information will help you protect yourself from infection.  · Make an appointment to see your healthcare provider as soon as possible.  · Follow the instructions until your healthcare provider tells you that you can stop.  · Some of the instructions may not be necessary. Ask your provider what's necessary for you.  Medicine  · Take your medicines exactly as directed.  · Dont take any other medicines, including those available over-the-counter unless your healthcare provider says its OK.  · Tell your provider about any side effects you have.  Skin care  · Wash your hands often, especially after using the bathroom. Make sure you wash them before and after changing any dressing or bandages.  · Avoid direct sun exposure. And use sunscreen that is labeled hydroallergenic and has an SPF of 30 or higher.   · Use an electric razor for shaving so you don't cut yourself.  · Check your skin daily for irritation, cracks, or rashes.  Keep your home clean  · Clean floors, carpets, furniture, and countertops regularly. Use products that kill germs.  · Be sure your kitchen is clean and all foods are safely stored.  · Be sure your bathroom is clean.  · Don't keep plants or flowers indoors. If you garden, wear gloves.  · Wash your hands after handling trash.  Prevent colds and the flu  · Wash your hands or use hand  often. Try to keep your hands away from your mouth, nose, and face. Make sure you wash your hands before eating.  · Avoid public places such as shopping malls, especially when crowded.  · Limit visits with young children. They often have colds or the flu.  · Avoid contact with anyone who has a cold, the flu, or another contagious condition (such as measles, chickenpox, herpes, pinkeye, cough, or sore throat).  · Check with your provider about whether or not you should wear a  mask when you are around people.  · Check with your provider about recommended immunizations or vaccines.  Other ways to lower your risk of infection  · Check with your provider before having close contact with others.  · Ask your provider before using cosmetics, contact lenses, tampons, or douches.  · Dont smoke or use tobacco products.  · Dont use portable humidifiers or vaporizers.  · Avoid contact with animals.  ¨ If you do touch an animal, wash your hands immediately afterward.  ¨ Avoid contact with pet urine or feces.  ¨ Dont clean litter boxes, cages, or aquariums.  · Check with your provider before cutting your nails. It may be advised that you file your nails instead. If you have trouble cutting or filing your own toenails, a podiatrist or foot healthcare provider can help.  · To avoid injuring your feet or coming in contact with germs, always wear shoes.  Follow-up care  Make sure you see your provider as soon as possible. You will likely have an exam and additional tests, if necessary. You will also have a chance to ask questions.  Make a follow-up appointment as directed by our staff.     When to call your healthcare provider  Call your healthcare provider right away if you have any of the following:  · Blurred vision or eye problems  · Trouble concentrating  · Ongoing fatigue  · Shortness of breath  · Rapid, irregular heartbeat  · Dizziness or lightheadedness  · Rash or hives  · Wheezing or trouble breathing  · Skin cut or sore that swells, turns red, feels hot or painful, or begins to ooze  · Fever of 100.4°F (38.0°C) or higher, or chills  · Diarrhea that does not go away after 2 loose stools  · Pain or cramping in the belly   Date Last Reviewed: 9/1/2016  © 0046-7792 [employer]. [address] [place] [address], [place] [address]. All rights reserved. This information is not intended as a substitute for professional medical care. Always follow your healthcare professional's  instructions.        Your High Blood Pressure Risk Factors  Risk factors are things that make you more likely to have a disease or condition. Do you know your risk factors for high blood pressure? You cant do anything about some risk factors. But other risk factors are things that can be changed. Know what high blood pressure risk factors you have. Then find out what changes you can make to help control your risk for high blood pressure. Start with the change that you think will be easiest for you.  Risk factors you cant control  Though you cant change any of the things listed below, check off the ones that apply to you. The more boxes you check, the greater your risk for high blood pressure.  Family history  ? One or both of your parents or grandparents has had high blood pressure or heart disease.  Gender and age  ? Youre a man over age 55 or a postmenopausal woman.  Risk factors you can control  There are plenty of risk factors for high blood pressure that you can control. Learn what these risk factors are and then find out how to reduce your risk. Check the ones that apply to you.  ? What you eat  Do you eat a lot of salty, fatty, fried, or greasy foods? Do you go to restaurants or eat out frequently?  ? Alcohol consumption  Do you drink more than 1 drink a day if you are a female or more than 2 drinks a day if you male?   ? If you smoke or someone close to you smokes  Do you smoke cigarettes or cigars, chew tobacco, or dip snuff? Are you exposed to second-hand smoke on a regular basis?  ? How active you are   Are you inactive most of the time at work and at home? Do you go weeks without exercising?  ? Your weight   Has your doctor said that you are 15 or more pounds overweight?  ? Your stress level    Do you often feel anxious, nervous, and stressed? Do you feel that you don't have a supportive environment?  Date Last Reviewed: 4/27/2016  © 3630-9004 [employer]. [address] [facility],  [place] [address]. All rights reserved. This information is not intended as a substitute for professional medical care. Always follow your healthcare professional's instructions.        Communicating About Pain    You have a right to have your pain evaluated and treated as effectively as possible. Untreated pain can limit eating, sleeping, and activity. Tell your healthcare provider where and how much you hurt. It may not be possible to relieve all the pain. But your healthcare provider can help you reach a pain level you can live with.  Your role  Tell your healthcare provider about the pain and your health problems. Be sure to:  · Mention all the medicines you take. This includes any you buy over-the-counter and any herbs, teas, or vitamins you take.  · Mention any pain relief techniques you use, like massage or meditation.  · Measure pain as directed by your healthcare provider.  · If your healthcare provider asks you to, keep a diary of your pain, the treatments you are using and how well they work. You may also be asked to describe how strong your pain is on a scale of zero to 10. Zero is no pain and 10 is the worst pain you can imagine. Be prepared to do this for your healthcare provider.  · Follow your treatment plan as directed by your healthcare provider. Tell your healthcare provider how your treatment is working.  As pain is reduced, youll feel better. Less pain means less stress on your body and mind.  Your healthcare providers role  Your healthcare provider will help you understand and manage pain. You will be told about your pain control options. These will most likely include medicines. Options like physical therapy and acupuncture may also help.  Note for family and friends  It may be hard to understand how your loved one feels. But the pain he or she has is real. You may not be able to stop the pain. You can help in other ways, though. Spend time with your loved one. This helps distract from the  pain. And help him or her take medicines on time and in the correct amount.   Date Last Reviewed: 5/1/2017  © 8347-3737 [employer]. [address] [place] [address], [place] [address]. All rights reserved. This information is not intended as a substitute for professional medical care. Always follow your healthcare professional's instructions.        Targeted Therapy for Cancer  Targeted therapy is a way to treat cancer. It is different from traditional chemotherapy. Traditional chemotherapy works by killing cells that grow quickly, such as cancer cells. Targeted therapy only affects parts (targets) of cells that grow quickly. Because of this, targeted therapy can cause fewer side effects. There are many kinds of targeted therapy medicines. They work differently on different types of cancers.  How does targeted therapy work?  The type of medicine used and its effects depend on the type of cancer being treated. Targeted therapy medicines work in various ways. Some help your body attack cancer cells. Others cause cancer cells to die. Still others change or slow the growth of cancer cells. Targeted therapy may be used in addition to surgery, chemotherapy, or radiation therapy. Or it may be used alone.  How is targeted therapy given?  Targeted therapy medicine can be given as a pill you take by mouth. It may be given as an injection. Or it can be given through an IV infusion. It may be done at home, or in a doctors office, clinic, or hospital. The length of time depends on the type of medicine and how it is given. You may need to have treatments every day, once a week, or every few weeks.  Common side effects of targeted therapy  The medicines used to treat cancer also often cause side effects. The side effects vary depending on which drug is used. Most of the side effects are temporary. They often go away a month or so after treatment ends. The most common side effects are:  · Skin issues. A rash can occur on the  head, face, and upper body. The skin may be warm and red. Bumps that look like acne may form on the skin. Your skin may become very dry and crack. It may itch, burn, or hurt to touch. It may have a yellow tint to it. Sores may develop on the scalp. The palms of the hands and soles of the feet may become swollen, red, and sore. These can include pain or numbness, redness, swelling, and blisters. Eyelids can become painful and swollen, or itchy. They may flip inward or outward. Cuticles may be red and painful.  · Hair and nail issues. The hair may become thin and very dry. It may break off more easily. Hair loss may occur on the scalp and lead to bald spots. Or, the hair can become curly. Hair may grow in darker during treatment. Hair on the face may grow faster or become thin. Fingernails and toenails may become weak and break easily, or pull away from the skin.  · Blood vessel issues. These can include high blood pressure and blood clots. You may also have bleeding and bruising, and poor wound healing. In some people, these issues can cause serious problems that need treatment.  · Digestive issues. These can include nausea and vomiting, and diarrhea or constipation. Digestive issues may also cause dehydration.  Managing side effects  With targeted therapy, most of the side effects go away after the therapy ends. During treatment, you can help manage side effects. This can help you be more comfortable. Tell your healthcare team as soon as you have side effect symptoms. Talk with them about how to manage your side effects. In some cases, your dose may need to be stopped for a time until your symptoms get better.  Risks and complications  Risks and complications of targeted cancer therapy include:  · Infection  · Failure to slow the growth of or kill cancer cells  · Damage to scalp that causes permanent hair loss  Your doctor will tell you about other risks that may apply to you.      Date Last Reviewed: 1/3/2016  ©  7288-2915 "[employer]. [address] [place] [address]. All rights reserved. This information is not intended as a substitute for professional medical care. Always follow your healthcare professional's instructions.        Understanding Urinary Tract Infections (UTIs)  Most UTIs are caused by bacteria, although they may also be caused by viruses or fungi. Bacteria from the bowel are the most common source of infection. The infection may start because of any of the following:  · Sexual activity. During sex, bacteria can travel from the penis, vagina, or rectum into the urethra.   · Bacteria on the skin outside the rectum may travel into the urethra. This is more common in women since the rectum and urethra are closer to each other than in men. Wiping from front to back after using the toilet and keeping the area clean can help prevent germs from getting to the urethra.  · Blockage of urine flow through the urinary tract. If urine sits too long, germs may start to grow out of control.      Parts of the urinary tract  The infection can occur in any part of the urinary tract.  · The kidneys collect and store urine.  · The ureters carry urine from the kidneys to the bladder.  · The bladder holds urine until you are ready to let it out.  · The urethra carries urine from the bladder out of the body. It is shorter in women, so bacteria can move through it more easily. The urethra is longer in men, so a UTI is less likely to reach the bladder or kidneys in men.  Date Last Reviewed: 1/1/2017  © 6273-5369 "[employer]. [address] [place] [address]. All rights reserved. This information is not intended as a substitute for professional medical care. Always follow your healthcare professional's instructions.        Anatomy of the Female Urinary Tract  Your urinary tract helps get rid of urine (your bodys liquid waste). The kidneys collect chemicals and water your body doesnt  need. This is turned into urine. Urine travels out of the kidneys through the ureters to the bladder. The bladder holds urine until youre ready to release it. The urethra carries urine from the bladder out of the body. The main sphincter muscle circles the mid-urethra.      Front view of female urinary tract.     Date Last Reviewed: 1/1/2017  © 3643-8034 [employer]. [address] [place] [address], [place] [address]. All rights reserved. This information is not intended as a substitute for professional medical care. Always follow your healthcare professional's instructions.

## 2018-04-06 ENCOUNTER — INFUSION (OUTPATIENT)
Dept: INFUSION THERAPY | Facility: HOSPITAL | Age: 68
End: 2018-04-06
Attending: SURGERY
Payer: MEDICARE

## 2018-04-06 VITALS — HEIGHT: 66 IN | WEIGHT: 140 LBS | BODY MASS INDEX: 22.5 KG/M2

## 2018-04-06 DIAGNOSIS — C7B.8 SECONDARY NEUROENDOCRINE TUMOR OF LIVER: ICD-10-CM

## 2018-04-06 DIAGNOSIS — C7A.8 NEUROENDOCRINE CARCINOMA, UNKNOWN PRIMARY SITE: ICD-10-CM

## 2018-04-06 DIAGNOSIS — C7B.8 SECONDARY NEUROENDOCRINE TUMOR OF DISTANT LYMPH NODES: Primary | ICD-10-CM

## 2018-04-06 PROCEDURE — 63600175 PHARM REV CODE 636 W HCPCS: Mod: JG | Performed by: SURGERY

## 2018-04-06 PROCEDURE — 96372 THER/PROPH/DIAG INJ SC/IM: CPT

## 2018-04-06 RX ORDER — LANREOTIDE ACETATE 120 MG/.5ML
120 INJECTION SUBCUTANEOUS
Status: CANCELLED | OUTPATIENT
Start: 2018-04-06

## 2018-04-06 RX ORDER — LANREOTIDE ACETATE 120 MG/.5ML
120 INJECTION SUBCUTANEOUS
Status: DISCONTINUED | OUTPATIENT
Start: 2018-04-06 | End: 2018-04-06 | Stop reason: HOSPADM

## 2018-04-06 RX ADMIN — LANREOTIDE ACETATE 120 MG: 120 INJECTION SUBCUTANEOUS at 01:04

## 2018-04-06 NOTE — NURSING
Tolerated Lanreotide injection to left gluteal well. Discharged home with next appointment scheduled.

## 2018-04-07 ENCOUNTER — NURSE TRIAGE (OUTPATIENT)
Dept: ADMINISTRATIVE | Facility: CLINIC | Age: 68
End: 2018-04-07

## 2018-04-07 NOTE — TELEPHONE ENCOUNTER
Reason for Disposition   Nursing judgment     RENARD Quinn states patient refused home health discharge today; rescheduled next week; patient did not discuss/report symptoms states she would not be home as she was out in community.    Protocols used: ST NO GUIDELINE OR REFERENCE [id number]

## 2018-04-10 RX ORDER — DIPHENOXYLATE HYDROCHLORIDE AND ATROPINE SULFATE 2.5; .025 MG/1; MG/1
1 TABLET ORAL 4 TIMES DAILY PRN
Qty: 60 TABLET | Refills: 2 | Status: SHIPPED | OUTPATIENT
Start: 2018-04-10 | End: 2018-04-18 | Stop reason: SDUPTHER

## 2018-04-10 NOTE — TELEPHONE ENCOUNTER
----- Message from Madie Leslie sent at 4/10/2018  3:22 PM CDT -----  Contact: self, [phone]  Patient requests her Diphenoxylate prescription refill sent to [facility] pharmacy in [place]. Please advise.

## 2018-04-12 ENCOUNTER — TELEPHONE (OUTPATIENT)
Dept: FAMILY MEDICINE | Facility: CLINIC | Age: 68
End: 2018-04-12

## 2018-04-12 ENCOUNTER — OUTPATIENT CASE MANAGEMENT (OUTPATIENT)
Dept: ADMINISTRATIVE | Facility: OTHER | Age: 68
End: 2018-04-12

## 2018-04-12 RX ORDER — TRAZODONE HYDROCHLORIDE 50 MG/1
TABLET ORAL
Refills: 6 | COMMUNITY
Start: 2018-03-22 | End: 2018-07-24

## 2018-04-12 RX ORDER — CIPROFLOXACIN 500 MG/1
TABLET ORAL
Refills: 2 | Status: ON HOLD | COMMUNITY
Start: 2018-03-08 | End: 2018-04-27

## 2018-04-12 NOTE — PROGRESS NOTES
1st Attempt to complete Social Work Assessment for Outpatient Care Management; left message requesting a return call.

## 2018-04-12 NOTE — PROGRESS NOTES
Summary:  LCSW received return call from patient and LCSW completed SW Assessment and PHQ9 with patient.  Pt is a 66yo  female who has been disabled for over 10 yrs.  Pt has been living with one of her 4 sons/Matias and his wife in Matias's home for appx 1 year. Pt prefers her son/Kumar to be her emergency and primary contact.  All 4 sons live in the same neighborhood.  Pt is independent with ADLs but and Kumar's wife/Asuncion is available to assist pt during the day of needed.  Sons are available in the evenings.  Pt uses [facility] transportation for MD appts and states gas can be expensive for family to transport from [facility] to [place].  Pt is interested in any additional transportation resources available.  Pt reports she purchases her groceries and prepares her meals separately from her son & dtr-in-law due to special diet/restrictions.  Pt interested in any food resources available.  Pt is not interested in Advance Directives.  Pt reports she is prescribed Trazadone by Pain mgmt doctor on the [facility].  LCSW requested pt read the pill bottle to LCSW:  Dr. Aquilino Gerber, 3/23/18, 50mg 1 tab nightly as needed.  Pt in agreement for SW to follow up in 2 weeks and for LCSW to mail resources.  LCSW will transfer case to [facility] for follow up and OPCM RN notified.    Interventions:  · Collaborate with OPCM RN as appropriate to meet patient needs.  · Complete community search for available resources on Financial Assistance, Transportation and Food Assistance and Cancer Assistance Programs.  · Discuss and provide Cancer Assistance application. Balwinder Villalobos  · Discuss patient care needs and potential barriers.  · Encourage family/social  and involvement in care.  · Provide financial assistance resource(s). Balwinder Villalobos, [employer] [place]  · Provide name(s) of organization(s) for specific diseases. American [employer]  · Provide supportive  counseling.  · Provide transportation resource(s). [facility], [employer]  · Inbasket msg to PCP and OPCM RN informing of medication as noted conor that is not currently on pt's med list    Plan:  Lanny Rosen LMSW to follow up in 2 weeks (4/26/18)  Ensure pt has received mailed resources  Review resources with patient to determine if any questions

## 2018-04-12 NOTE — TELEPHONE ENCOUNTER
----- Message from Lakia Rivera LCSW sent at 4/12/2018  1:00 PM CDT -----  I completed a SW Assessment with this patient today.  Pt reports she is prescribed Trazadone by Pain mgmt doctor on the [facility].  MONTY requested pt read the pill bottle to MONTY:  Dr. Auqilino Gerber, 3/23/18, 50mg 1 tab nightly as needed.  Please review and update Med list if appropriate.      Thank you,  Lakia Rivera LCSW, CCM

## 2018-04-12 NOTE — LETTER
April 12, 2018    Patito Domingo  [address]  [place] [address]             [facility] [address] [place] [address] Dear MsRylan Chayo:    I have enclosed resources for food assistance and cancer assistance programs that include transportation resources as discussed via telephone today.  I hope this will be helpful.    Lanny Rosen LMSW will be your assigned  and will follow up with you in two weeks.  She can be reached at [phone].    Sincerely,      Lakia Rivera LCSW, [facility]    Outpatient Complex Care Management

## 2018-04-12 NOTE — PROGRESS NOTES
Talked to patient to update plan of care for OPCM.  Patient stated that she is taking all medications as prescribed.  Patient denies having any SOB, cough or falls since last conversation.   Patient stated that she did receive all educational materials in the mail but has not had chance to review. Patient stated that she is continuing to experience nausea especially after having chemo.  Discussed with patient ways to help with nausea.  Encouraged patient to try to sip on Ginger Ale and patient stated that she will try that in the near future.  Also discussed with patient to take the Zofran 2mg po q 6 hrs prn nausea to also help alleviate nausea and verbalized understanding.  Patient stated that [facility] is visiting patient:  RN twice a week.  Patient stated that she is testing B/P daily and today's B/P is 138/80mmHg.  Patient stated that she is having back pain at times.  Patient stated that she is urinating without difficulty and is voiding clear yellow urine.  Reminded patient of upcoming appointment:  4/18 at 0830 for mammogram.  Encouraged patient to call this RN if having any questions/concerns.  Will f/u with patient in one week.  Will continue to follow.  ENA Durand OPCM-RN    Interventions;  -Educated patient about taking Zofran as prescribed to help with nausea  Reminded patient of mammogram on 4/18  Discussed with patient is taking B/P at least daily and log results in B/P log    Plan:  Continue to educate about liver CA. Review signs and symptoms of CHF flare up. Encourage patient to follow medication and treatment regimen. Encourage patient to maintain follow up with doctors.   -Continue to educate about fall prevention and safety in home setting. Encourage patient to follow medication and treatment regimen. Encourage patient to maintain follow up with doctors.  -Continue to educate about UTI.  Review s/s of UTI.  Encourage patient to follow medication and treatment regimen. Encourage patient to maintain  follow up with doctors.  ENA Durand, OPCM-RN

## 2018-04-13 ENCOUNTER — NURSE TRIAGE (OUTPATIENT)
Dept: ADMINISTRATIVE | Facility: CLINIC | Age: 68
End: 2018-04-13

## 2018-04-13 NOTE — TELEPHONE ENCOUNTER
Spoke to patient she states when she goes to Urgent Care or Emergency Department they give her the same medication every time or admit her and she still goes home with the same issue. Patient would like to know if there is another medication OTC or prescription that can help her with diarrhea.

## 2018-04-13 NOTE — TELEPHONE ENCOUNTER
"    Reason for Disposition   [1] SEVERE diarrhea (e.g., 7 or more times / day more than normal) AND [2]  age > 60 years    Answer Assessment - Initial Assessment Questions  1. DIARRHEA SEVERITY: "How bad is the diarrhea?" "How many extra stools have you had in the past 24 hours than normal?"     - MILD: Few loose or mushy BMs; increase of 1-3 stools over normal daily number of stools; mild increase in ostomy output.    - MODERATE: Increase of 4-6 stools daily over normal; moderate increase in ostomy output.    - SEVERE (or Worst Possible): Increase of 7 or more stools daily over normal; moderate increase in ostomy output; incontinence.      moderate  2. ONSET: "When did the diarrhea begin?"       Last night  3. BM CONSISTENCY: "How loose or watery is the diarrhea?"       watery  4. VOMITING: "Are you also vomiting?" If so, ask: "How many times in the past 24 hours?"       No, but she does have nausea  5. ABDOMINAL PAIN: "Are you having any abdominal pain?" If yes: "What does it feel like?" (e.g., crampy, dull, intermittent, constant)       Yes, feels like cramps, comes and goes  6. ABDOMINAL PAIN SEVERITY: If present, ask: "How bad is the pain?"  (e.g., Scale 1-10; mild, moderate, or severe)     - MILD (1-3): doesn't interfere with normal activities, abdomen soft and not tender to touch      - MODERATE (4-7): interferes with normal activities or awakens from sleep, tender to touch      - SEVERE (8-10): excruciating pain, doubled over, unable to do any normal activities        Moderate, wont give anumber  7. ORAL INTAKE: If vomiting, "Have you been able to drink liquids?" "How much fluids have you had in the past 24 hours?"      No vomiting  8. HYDRATION: "Any signs of dehydration?" (e.g., dry mouth [not just dry lips], too weak to stand, dizziness, new weight loss) "When did you last urinate?"      Drinking a lot of water, last urinated a few minutes ago  9. EXPOSURE: "Have you traveled to a foreign country " "recently?" "Have you been exposed to anyone with diarrhea?" "Could you have eaten any food that was spoiled?"      No to all  10. OTHER SYMPTOMS: "Do you have any other symptoms?" (e.g., fever, blood in stool)        No.  Lightheaded because she can't eat much because it causes her to go to the bathroom  11. 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    Protocols used: ST DIARRHEA-A-      "

## 2018-04-18 ENCOUNTER — OUTPATIENT CASE MANAGEMENT (OUTPATIENT)
Dept: ADMINISTRATIVE | Facility: OTHER | Age: 68
End: 2018-04-18

## 2018-04-18 ENCOUNTER — TELEPHONE (OUTPATIENT)
Dept: FAMILY MEDICINE | Facility: CLINIC | Age: 68
End: 2018-04-18

## 2018-04-18 RX ORDER — DIPHENOXYLATE HYDROCHLORIDE AND ATROPINE SULFATE 2.5; .025 MG/1; MG/1
1 TABLET ORAL 4 TIMES DAILY PRN
Qty: 60 TABLET | Refills: 2 | Status: SHIPPED | OUTPATIENT
Start: 2018-04-18 | End: 2018-06-19 | Stop reason: SDUPTHER

## 2018-04-18 NOTE — PROGRESS NOTES
"Talked to patient to update plan of care for OPC.  Patient stated that she is taking all medications as prescribed.  Medication reconciliation performed and patient stated that she is taking all medications as prescribed.  Patient stated that she did have a mammogram this morning and awaiting results.  Patient stated that she is having diarrhea and is taking lomotil as directed.  Patient stated that she did convey her episode of diarrhea to Dr. Hernandez on 4/13.  I will send an in-basket message to Dr. Hernandez about patient c/o diarrhea.  Also encouraged patient to make sure that she is getting enough fluids to prevent dehydration and verbalized understanding.  Patient denies having any falls, SOB or cough since last conversation.  Patient stated that she did receive all educational materials via mail and has not had a chance to review all of them at this time. Patient stated that she currently doesn't have any mouth sores but is using the "mouth swish" as directed.  Patient stated that she is urinating without difficulty.  Reminded patient of upcoming appointments:  5/4 at 1200 for chemo and verbalized understanding.  Encouraged patient to call this RN if having any questions/concerns.  Will continue to coordinate care with [facility] for coordination of care.  Will continue to follow.  ENA Durand OPCM-RN    Interventions:  Re-performed medication reconciliation and is taking all meds as prescribed.  Sent an in-basket message to Dr. Hernandez re:  C/o diarrhea  Encouraged patient to increase fluids to prevent dehydration  Reminded patient of upcoming appointment on 5/4 for chemo    Plan:  Continue to educate about liver CA. Review signs and symptoms of CHF flare up. Encourage patient to follow medication and treatment regimen. Encourage patient to maintain follow up with doctors.   -Continue to educate about fall prevention and safety in home setting. Encourage patient to follow medication and treatment " regimen. Encourage patient to maintain follow up with doctors.  -Continue to educate about UTI.  Review s/s of UTI.  Encourage patient to follow medication and treatment regimen. Encourage patient to maintain follow up with doctors.  MARIAM ManningM-RN

## 2018-04-18 NOTE — PROGRESS NOTES
"Summary:  Lanny Rosen LMSW consulted with this LCSW Sup regarding communication with OPCM RN/Lorena Durand about pt feeling "down" due to current medical issues.  This LCSW Sup attempted to reach pt and pt's son/Kumar and left voice messages for both requesting a return call.      Update 2:28pm:  LCSW Sup received a return call from pt.  Pt reports her stomach is hurting and she has had multiple trips to the bathroom.  Pt denies any SI/HI and her family is aware and supportive that she is not feeling well.  Dtr-in-law went to  her children from the school bus and will be back with patient around 3:30pm.  LCSW informed pt that LCSW also left a msg for son but that son does not need to return call since LCSW spoke with pt.      Interventions:  LCSW confirmed that pt has the [facility] line and the crisis line if needed.    Inbasket msg to OPCM RN and OPCM LMSW for case collaboration    Plan:  LMSW will follow up with pt as scheduled on 4/26/18.      "

## 2018-04-18 NOTE — TELEPHONE ENCOUNTER
----- Message from Lorena Durand RN sent at 4/18/2018  1:26 PM CDT -----  Hi. This is Lorena Durand RN. I am with the Outpatient case management team with [facility]. I received a referral on the above patient. I spoke with patient today on the telephone.    Patient is c/o having diarrhea for about one week.  Patient stated that she is taking lomotil as directed.  I also encouraged patient to force fluids to prevent dehydration.  Please advise.      Please notify patient of your recommendations.     Thank you,  Lorena Durand R.N.  Outpatient Complex Case Manager

## 2018-04-19 ENCOUNTER — HOSPITAL ENCOUNTER (OUTPATIENT)
Facility: HOSPITAL | Age: 68
Discharge: HOME | End: 2018-04-19
Attending: EMERGENCY MEDICINE | Admitting: SURGERY
Payer: MEDICARE

## 2018-04-19 VITALS
HEIGHT: 66 IN | RESPIRATION RATE: 16 BRPM | WEIGHT: 145 LBS | OXYGEN SATURATION: 99 % | HEART RATE: 60 BPM | BODY MASS INDEX: 23.3 KG/M2 | TEMPERATURE: 97 F | DIASTOLIC BLOOD PRESSURE: 77 MMHG | SYSTOLIC BLOOD PRESSURE: 136 MMHG

## 2018-04-19 DIAGNOSIS — D3A.8 NEUROENDOCRINE TUMOR: ICD-10-CM

## 2018-04-19 DIAGNOSIS — R10.9 PAIN IN THE ABDOMEN: ICD-10-CM

## 2018-04-19 DIAGNOSIS — N30.00 ACUTE CYSTITIS WITHOUT HEMATURIA: ICD-10-CM

## 2018-04-19 DIAGNOSIS — R19.7 DIARRHEA, UNSPECIFIED TYPE: Primary | ICD-10-CM

## 2018-04-19 LAB
ALBUMIN SERPL BCP-MCNC: 3.7 G/DL
ALP SERPL-CCNC: 94 U/L
ALT SERPL W/O P-5'-P-CCNC: 10 U/L
ANION GAP SERPL CALC-SCNC: 10 MMOL/L
AST SERPL-CCNC: 15 U/L
BACTERIA #/AREA URNS HPF: ABNORMAL /HPF
BASOPHILS # BLD AUTO: 0.01 K/UL
BASOPHILS NFR BLD: 0.2 %
BILIRUB SERPL-MCNC: 0.8 MG/DL
BILIRUB UR QL STRIP: NEGATIVE
BUN SERPL-MCNC: 11 MG/DL
CALCIUM SERPL-MCNC: 10.2 MG/DL
CHLORIDE SERPL-SCNC: 97 MMOL/L
CLARITY UR: ABNORMAL
CO2 SERPL-SCNC: 31 MMOL/L
COLOR UR: YELLOW
CREAT SERPL-MCNC: 1 MG/DL
DIFFERENTIAL METHOD: ABNORMAL
EOSINOPHIL # BLD AUTO: 0 K/UL
EOSINOPHIL NFR BLD: 0.2 %
ERYTHROCYTE [DISTWIDTH] IN BLOOD BY AUTOMATED COUNT: 14.2 %
EST. GFR  (AFRICAN AMERICAN): >60 ML/MIN/1.73 M^2
EST. GFR  (NON AFRICAN AMERICAN): 59 ML/MIN/1.73 M^2
GLUCOSE SERPL-MCNC: 116 MG/DL
GLUCOSE UR QL STRIP: NEGATIVE
HCT VFR BLD AUTO: 34.2 %
HGB BLD-MCNC: 10.9 G/DL
HGB UR QL STRIP: NEGATIVE
INR PPP: 1
KETONES UR QL STRIP: NEGATIVE
LEUKOCYTE ESTERASE UR QL STRIP: ABNORMAL
LIPASE SERPL-CCNC: 7 U/L
LYMPHOCYTES # BLD AUTO: 0.9 K/UL
LYMPHOCYTES NFR BLD: 18.1 %
MCH RBC QN AUTO: 27.3 PG
MCHC RBC AUTO-ENTMCNC: 31.9 G/DL
MCV RBC AUTO: 86 FL
MICROSCOPIC COMMENT: ABNORMAL
MONOCYTES # BLD AUTO: 0.3 K/UL
MONOCYTES NFR BLD: 7 %
NEUTROPHILS # BLD AUTO: 3.5 K/UL
NEUTROPHILS NFR BLD: 74.3 %
NITRITE UR QL STRIP: POSITIVE
PH UR STRIP: 8 [PH] (ref 5–8)
PLATELET # BLD AUTO: 309 K/UL
PMV BLD AUTO: 9.6 FL
POTASSIUM SERPL-SCNC: 3.7 MMOL/L
PROT SERPL-MCNC: 7.8 G/DL
PROT UR QL STRIP: NEGATIVE
PROTHROMBIN TIME: 10.5 SEC
RBC # BLD AUTO: 4 M/UL
SODIUM SERPL-SCNC: 138 MMOL/L
SP GR UR STRIP: 1.02 (ref 1–1.03)
SQUAMOUS #/AREA URNS HPF: 1 /HPF
URN SPEC COLLECT METH UR: ABNORMAL
UROBILINOGEN UR STRIP-ACNC: NEGATIVE EU/DL
WBC # BLD AUTO: 4.74 K/UL
WBC #/AREA URNS HPF: 8 /HPF (ref 0–5)

## 2018-04-19 PROCEDURE — 25000003 PHARM REV CODE 250: Performed by: EMERGENCY MEDICINE

## 2018-04-19 PROCEDURE — 81000 URINALYSIS NONAUTO W/SCOPE: CPT

## 2018-04-19 PROCEDURE — 80053 COMPREHEN METABOLIC PANEL: CPT

## 2018-04-19 PROCEDURE — G0378 HOSPITAL OBSERVATION PER HR: HCPCS

## 2018-04-19 PROCEDURE — 96375 TX/PRO/DX INJ NEW DRUG ADDON: CPT

## 2018-04-19 PROCEDURE — 87086 URINE CULTURE/COLONY COUNT: CPT

## 2018-04-19 PROCEDURE — 93005 ELECTROCARDIOGRAM TRACING: CPT

## 2018-04-19 PROCEDURE — 93010 ELECTROCARDIOGRAM REPORT: CPT | Mod: ,,, | Performed by: INTERNAL MEDICINE

## 2018-04-19 PROCEDURE — 85610 PROTHROMBIN TIME: CPT

## 2018-04-19 PROCEDURE — 96374 THER/PROPH/DIAG INJ IV PUSH: CPT

## 2018-04-19 PROCEDURE — 83690 ASSAY OF LIPASE: CPT

## 2018-04-19 PROCEDURE — 96376 TX/PRO/DX INJ SAME DRUG ADON: CPT

## 2018-04-19 PROCEDURE — 63600175 PHARM REV CODE 636 W HCPCS: Performed by: EMERGENCY MEDICINE

## 2018-04-19 PROCEDURE — 96361 HYDRATE IV INFUSION ADD-ON: CPT

## 2018-04-19 PROCEDURE — 87088 URINE BACTERIA CULTURE: CPT

## 2018-04-19 PROCEDURE — 87040 BLOOD CULTURE FOR BACTERIA: CPT | Mod: 59

## 2018-04-19 PROCEDURE — 87077 CULTURE AEROBIC IDENTIFY: CPT

## 2018-04-19 PROCEDURE — 87186 SC STD MICRODIL/AGAR DIL: CPT | Mod: 59

## 2018-04-19 PROCEDURE — 85025 COMPLETE CBC W/AUTO DIFF WBC: CPT

## 2018-04-19 PROCEDURE — 99285 EMERGENCY DEPT VISIT HI MDM: CPT | Mod: 25

## 2018-04-19 RX ORDER — ONDANSETRON 2 MG/ML
4 INJECTION INTRAMUSCULAR; INTRAVENOUS
Status: COMPLETED | OUTPATIENT
Start: 2018-04-19 | End: 2018-04-19

## 2018-04-19 RX ORDER — HYDRALAZINE HYDROCHLORIDE 20 MG/ML
10 INJECTION INTRAMUSCULAR; INTRAVENOUS
Status: COMPLETED | OUTPATIENT
Start: 2018-04-19 | End: 2018-04-19

## 2018-04-19 RX ORDER — HYDROMORPHONE HYDROCHLORIDE 2 MG/ML
0.5 INJECTION, SOLUTION INTRAMUSCULAR; INTRAVENOUS; SUBCUTANEOUS
Status: COMPLETED | OUTPATIENT
Start: 2018-04-19 | End: 2018-04-19

## 2018-04-19 RX ADMIN — HYDRALAZINE HYDROCHLORIDE 10 MG: 20 INJECTION INTRAMUSCULAR; INTRAVENOUS at 04:04

## 2018-04-19 RX ADMIN — ONDANSETRON 4 MG: 2 INJECTION, SOLUTION INTRAMUSCULAR; INTRAVENOUS at 11:04

## 2018-04-19 RX ADMIN — SODIUM CHLORIDE, SODIUM LACTATE, POTASSIUM CHLORIDE, AND CALCIUM CHLORIDE: .6; .31; .03; .02 INJECTION, SOLUTION INTRAVENOUS at 11:04

## 2018-04-19 RX ADMIN — HYDROMORPHONE HYDROCHLORIDE 0.5 MG: 2 INJECTION INTRAMUSCULAR; INTRAVENOUS; SUBCUTANEOUS at 04:04

## 2018-04-19 RX ADMIN — HYDROMORPHONE HYDROCHLORIDE 0.5 MG: 2 INJECTION INTRAMUSCULAR; INTRAVENOUS; SUBCUTANEOUS at 12:04

## 2018-04-19 NOTE — ED PROVIDER NOTES
Encounter Date: 4/19/2018    SCRIBE #1 NOTE: I, Bg Paul, am scribing for, and in the presence of,  Dr. Collins. I have scribed the entire note.       History     Chief Complaint   Patient presents with    Abdominal Pain     c/o right-sided abdominal pain, diarrhea, and vomiting since having gallbladder removed by Dr. Perez about 1 month ago     Time seen by provider: 10:38 AM    This is a 67 y.o. female who presents with complaint of intermittent right-sided abdominal pain with associated diarrhea, vomiting, and nausea since having gallbladder removed 2 months ago by Dr. Perez. She reports having emergency surgery due to a blockage in her gallbladder. The patient reports that her pain does not radiate anywhere. Patient reports an increase in frequency of her chronic diarrhea in the past week, up to 5-6 episodes each evening. She also reports subjective chills, and reports no change in appetite. She denies any blood in her stool, urinary symptoms, no change in appetite, or fever. Patient was started on antibiotics after her surgery, but she finished about 1 month ago. This patient was was given Imodium without much relief. This patient has a history of a neuroendocrine tumor. 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      The history is provided by the patient.     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)      Past Surgical History:   Procedure Laterality Date    CATARACT EXTRACTION Left 10/2017    CHOLECYSTECTOMY      COLON SURGERY      cystoscope      EYE SURGERY      HYSTERECTOMY  5/1996    LITHOTRIPSY      LIVER BIOPSY  9/14    carcinoid     Family History   Problem Relation Age of Onset    Cancer Mother      unknown    Alzheimer's disease Father     Stroke Sister     No Known Problems Son     No Known Problems Son     No Known Problems Son     No Known Problems Son      Social History   Substance Use Topics    Smoking status: Never Smoker    Smokeless tobacco: Never Used    Alcohol use No     Review of Systems   Constitutional: Positive for chills. Negative for appetite change and fever.   Gastrointestinal: Positive for abdominal pain, diarrhea, nausea and vomiting. Negative for blood in stool.   Genitourinary: Negative for dysuria and hematuria.   All other systems reviewed and are negative.      Physical Exam     Initial Vitals [04/19/18 0922]   BP Pulse Resp Temp SpO2   (!) 160/77 78 20 98.5 °F (36.9 °C) 98 %      MAP       104.67         Physical Exam    Nursing note and vitals reviewed.  Constitutional: She appears well-developed and well-nourished. She is not diaphoretic. No distress.   HENT:   Head: Normocephalic and atraumatic.   Dry mucous membranes   Eyes: Conjunctivae and EOM are normal.   Neck: Normal range of motion. Neck supple. No JVD present.   Cardiovascular: Normal rate, regular rhythm and normal heart sounds. Exam reveals no gallop and no friction rub.    No murmur heard.  Pulmonary/Chest: Breath sounds normal. She has no wheezes. She has no rhonchi. She has no rales.   Lungs CTAB   Abdominal: Soft. She exhibits no distension. There is tenderness.   Hyperactive bowel sounds, general mild soreness, RUQ TTP   Musculoskeletal: Normal range of motion. She exhibits no edema or tenderness.   Lymphadenopathy:     She has no cervical adenopathy.    Neurological: She is alert and oriented to person, place, and time. She has normal strength.   Skin: Skin is warm and dry. Capillary refill takes less than 2 seconds.         ED Course   Procedures  Labs Reviewed - No data to display  EKG Readings: (Independently Interpreted)   Sinus rhythm at 62 bpm, no ST elevation, normal axis     Imaging Results          X-Ray Abdomen Flat And Erect (Final result)  Result time 04/19/18 11:58:04    Final result by Renetta Snow MD (04/19/18 11:58:04)                 Impression:      Possible small regional ileus.    Right renal Stones.      Electronically signed by: Renetta Snow MD  Date:    04/19/2018  Time:    11:58             Narrative:    EXAMINATION:  XR ABDOMEN FLAT AND ERECT    CLINICAL HISTORY:  Diarrhea (787.91);    TECHNIQUE:  Flat and erect AP views of the abdomen were performed.    COMPARISON:  Correlation with CT 03/19/2018    FINDINGS:  No significant stool retention.  Slightly air distended loop of bowel in the left upper abdominal quadrant.  Nonspecific bowel gas pattern.  No bowel dilation.  Possible small air-fluid level in the left upper abdominal quadrant.  No organomegaly.  Calcification right mid abdomen region are known renal Stones.  The lung bases are clear.  Advanced degenerative changes of the lumbar spine.  Surgical clips right upper abdominal quadrant.                                   Medical Decision Making:   Clinical Tests:   Lab Tests: Ordered and Reviewed  Radiological Study: Ordered and Reviewed  Medical Tests: Ordered and Reviewed  ED Management:  1:48 PM  Spoke with Dr. Figueroa, neuroendocrinology, who will admit the patient.    2:25 PM  Surgeon came to bedside to evaluate patient and he discussed with me that he talked to Dr. Perez, who knows patient well. He believes that the patient is able to discharged home without admission.                      Clinical Impression:     1. Pain in the abdomen            Scribe  attestation  I agree with NP note                 Meghna Collins MD  04/22/18 [id number]

## 2018-04-19 NOTE — TELEPHONE ENCOUNTER
Maya,  I messaged Dr. Hernandez with message below.  She advised to call patient and recommend that she continue the Lomotil as directed, push fluids to stay hydrated and add on bananas to add bulk to stools.

## 2018-04-19 NOTE — ED NOTES
Assumed care of a 67 year old female complaint of sever abdomen pain for one month after having gall bladder removed , chronic diarrhea . Nausea today . Just feeling cold all over

## 2018-04-19 NOTE — CONSULTS
[facility]  General Surgery  Consult Note    Consults  Subjective:     Chief Complaint/Reason for Admission: Abd pain, recurrent UTI, N/V/D    History of Present Illness: 68 yo female well known to NET service.  Most recent surgery open leslie 2 months ago.  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 Since then has complained of persistent pain.  She also has a history of chronic diarrhea - she was placed on monthly octreotide injections though this has only resolved her symptoms some.  She has been seen several times for chronic pain.  On admit today, patient requests IV narcotics, which has been her requests in the past.      She states she has urinary frequency and urgency.    No current facility-administered medications on file prior to encounter.      Current Outpatient Prescriptions on File Prior to Encounter   Medication Sig    cholestyramine (QUESTRAN) 4 gram packet Take 1 packet (4 g total) by mouth 3 (three) times daily with meals.    ciprofloxacin HCl (CIPRO) 500 MG tablet TAKE 1 TABLET(S) EVERY 12 HOURS BY ORAL ROUTE AS DIRECTED FOR 10 DAYS.    cyanocobalamin 1,000 mcg/mL injection 1,000 mcg every 30 days.     diphenoxylate-atropine 2.5-0.025 mg (LOMOTIL) 2.5-0.025 mg per tablet Take 1 tablet by mouth 4 (four) times daily as needed for Diarrhea.    ergocalciferol (VITAMIN D2) 50,000 unit Cap Take 1 capsule (50,000 Units total) by mouth every 7 days.    ferrous gluconate (FERGON) 324 MG tablet TAKE 1 TABLET EVERY DAY WITH BREAKFAST    gabapentin (NEURONTIN) 100 MG capsule TAKE 2 CAPSULES TWICE A DAY    lanreotide (SOMATULINE DEPOT) 120 mg/0.5 mL Syrg Inject 120 mg into the skin every 28 days.    lifitegrast (XIIDRA) 5 % Dpet Xiidra 5 % eye drops in a dropperette    losartan (COZAAR) 50 MG tablet TAKE 1 TABLET BY MOUTH DAILY    metoprolol tartrate (LOPRESSOR) 50 MG tablet Take 1 tablet (50 mg total) by mouth 2 (two) times daily.    nystatin (MYCOSTATIN) 100,000  unit/mL suspension SWISH AND SWALLOW 1 TEASPOON BY MOUTH 4 TIMES A DAY. ONCE RELIEVED CONTINUE FOR 48 HOURS    ondansetron (ZOFRAN) 4 MG tablet Take 1 tablet (4 mg total) by mouth every 6 (six) hours as needed for Nausea.    oxyCODONE (ROXICODONE) 30 MG Tab     prednisoLONE acetate (PRED FORTE) 1 % DrpS prednisolone acetate 1 % eye drops,suspension    traZODone (DESYREL) 50 MG tablet TK 1 TO 2 TS PO  QHS PRF SLEEP    XIIDRA 5 % Dpet        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)      Past Surgical History:   Procedure Laterality Date    CATARACT EXTRACTION Left 10/2017    CHOLECYSTECTOMY      COLON SURGERY      cystoscope      EYE SURGERY      HYSTERECTOMY  5/1996    LITHOTRIPSY      LIVER BIOPSY  9/14    carcinoid     Family History     Problem Relation (Age of Onset)    Alzheimer's disease Father    Cancer Mother    No Known Problems Son, Son, Son, Son    Stroke Sister        Social History Main Topics    Smoking status: Never Smoker    Smokeless tobacco: Never Used    Alcohol use No    Drug use: No    Sexual activity: Not Currently     Review of Systems negative except as above  Objective:     Vital Signs (Most Recent):  Temp: 99.4 °F (37.4 °C) (04/19/18 1357)  Pulse: 65 (04/19/18 1357)  Resp: 18 (04/19/18 1357)  BP: (!) 208/95 (04/19/18 1357)  SpO2: 99 % (04/19/18 1357) Vital Signs (24h Range):  Temp:  [98.5 °F (36.9 °C)-99.4 °F (37.4 °C)] 99.4 °F (37.4  °C)  Pulse:  [65-78] 65  Resp:  [16-20] 18  SpO2:  [98 %-99 %] 99 %  BP: (160-238)/() 208/95     Weight: 65.8 kg (145 lb)  Body mass index is 23.4 kg/m².    No intake or output data in the 24 hours ending 04/19/18 1429    Physical Exam   NAD currently, recently received some IV pain medicine  Supple  RRR, hypertensive to 200's SBP  Moving air comfortably  She is tender over a well defined healing fascial ridge and no where else - she has a soft belly and no peritoneal signs.  The incisions (midline lap and RUQ subcostal) are healing well and there are no hernias or masses.  Skin warm and well perfused.    Significant Labs:  Labs unremarkable except for UTI    Significant Diagnostics:  Nonspecific gas pattern    Assessment/Plan:     Active Diagnoses:    Diagnosis Date Noted POA    Diarrhea [R19.7] 04/19/2018 Yes      Problems Resolved During this Admission:    Diagnosis Date Noted Date Resolved POA     66 yo with history of carcinoid, s/p resection, chronic diarrhea, chronic pain  - She exhibits no findings of infection nor acute abdomen - reassured her that her pain near the fascial ridge with take some time (roughly 6 month) to soften and heal fully, but that she has no other signs of abdominal pain other than possibly UTI.  WBC normal and afebrile.  She has exhibited narcotic seeking in the past and this could be reflected with generalized abdominal pain, however she has no acute exam, lab, or radiological findings.  - Ok with tx for UTI and may be discharged to home.  - Hypertensive, recently took home medications as prescribed.    Chung Figueroa MD  General Surgery  [facility]

## 2018-04-19 NOTE — ED NOTES
Resting quietly with eyes closed . Easily aroused complains of only incision aching now. Incision to mid abdomen healing well

## 2018-04-23 LAB
BACTERIA UR CULT: NORMAL
BACTERIA UR CULT: NORMAL

## 2018-04-24 LAB
BACTERIA BLD CULT: NORMAL
BACTERIA BLD CULT: NORMAL

## 2018-04-26 ENCOUNTER — OUTPATIENT CASE MANAGEMENT (OUTPATIENT)
Dept: ADMINISTRATIVE | Facility: OTHER | Age: 68
End: 2018-04-26

## 2018-04-26 PROBLEM — N17.9 AKI (ACUTE KIDNEY INJURY): Status: ACTIVE | Noted: 2018-04-26

## 2018-04-26 NOTE — PROGRESS NOTES
1st Attempt to complete SW follow-up for Outpatient Care Management; left message requesting return call.    Update: LMSW received incoming voice message from patient. LMSW attempted to contact patient again via telephone and left voice message.     Plan:  LMSW will attempt to follow up with patient again on tomorrow 4/27/18.

## 2018-04-26 NOTE — PROGRESS NOTES
Attempted to update plan of care for OPCM; left voice message to return call.  ENA Durand, OPCM-RN

## 2018-04-27 ENCOUNTER — OUTPATIENT CASE MANAGEMENT (OUTPATIENT)
Dept: ADMINISTRATIVE | Facility: OTHER | Age: 68
End: 2018-04-27

## 2018-04-27 NOTE — PROGRESS NOTES
LMSW completed chart review and noted Pt admitted to hospital on yesterday and remains inpatient today. LMSW will attempt to complete follow up after Pt discharge.

## 2018-04-28 PROBLEM — R20.0 LEFT ARM NUMBNESS: Status: ACTIVE | Noted: 2018-04-28

## 2018-04-28 PROBLEM — N39.0 URINARY TRACT INFECTION DUE TO ESBL KLEBSIELLA: Status: ACTIVE | Noted: 2018-04-28

## 2018-04-28 PROBLEM — E87.6 HYPOKALEMIA: Status: ACTIVE | Noted: 2018-04-28

## 2018-04-28 PROBLEM — R20.0 LEFT FACIAL NUMBNESS: Status: ACTIVE | Noted: 2018-04-28

## 2018-04-28 PROBLEM — K52.9 CHRONIC DIARRHEA: Status: ACTIVE | Noted: 2018-04-28

## 2018-04-28 PROBLEM — B96.89 URINARY TRACT INFECTION DUE TO ESBL KLEBSIELLA: Status: ACTIVE | Noted: 2018-04-28

## 2018-04-29 PROBLEM — E83.42 HYPOMAGNESEMIA: Status: ACTIVE | Noted: 2018-04-29

## 2018-04-29 PROBLEM — E87.6 HYPOKALEMIA: Status: RESOLVED | Noted: 2018-04-28 | Resolved: 2018-04-29

## 2018-05-01 ENCOUNTER — PATIENT OUTREACH (OUTPATIENT)
Dept: ADMINISTRATIVE | Facility: CLINIC | Age: 68
End: 2018-05-01

## 2018-05-01 NOTE — PATIENT INSTRUCTIONS
Discharge Instructions for Acute Kidney Injury  You have been diagnosed with acute kidney injury. This means that your kidneys are not working properly. When both kidneys are healthy, they help filter out fluid and waste from the blood and body. Acute kidney injury has many causes. These include urinary blockages, infection, lack of enough blood supply, and medicines that can injure kidneys. In some cases, acute kidney injury is short-term (temporary), lasting several days to a few months. This is because the kidney can repair itself. But acute kidney injury can also result in chronic kidney disease or end stage renal failure. Here are some instructions for you to follow as you recover.  Home care  · Follow any instructions for eating and drinking given to you by your healthcare provider.  ? Drink less fluid, if instructed by your healthcare provider.  ? Keep a record of everything you eat and drink.  · Measure the amount of urine and stool you have each day.  · Weigh yourself every day, at the same time of day, and in the same kind of clothes. Keep a daily record of your daily weights.  · Take your temperature every day. Keep a record of the results.  · Learn to take your own blood pressure. Keep a record of your results. Ask your healthcare provider when you should seek emergency medical attention. Your provider will tell you which blood pressure reading is dangerous.  · Avoid contact with people who have infections (colds, bronchitis, or skin conditions).  · Practice good personal hygiene. This is especially important if you have a catheter in place when you leave the hospital. Doing so helps keep you safe from infection.  · Take your medicines exactly as directed.  · You may require frequent blood and urine tests to monitor your kidney function.  Follow-up care  Follow up with your healthcare provider, or as advised.  When to seek medical care  Call your healthcare provider right away if you have any of the  following:  · Signs of bladder infection (urinating more often than usual, or burning, pain, bleeding, or hesitancy when you urinate)  · Signs of infection around your catheter (redness, swelling, warmth, or drainage)  · Rapid weight loss or weight gain, such as 3 pounds or more in 24 hours or 6 pounds or more in 7 days  · Fever above 100.4°F (38.0°C) or chills  · Muscle aches  · Night sweats  · Very little or no urine output  · Swelling of your hands, legs, or feet  · Back pain  · Abdominal pain  · Extreme tiredness   Date Last Reviewed: 2/1/2017  © 1027-2533 [employer]. [address] [place] [address], [place] [address]. All rights reserved. This information is not intended as a substitute for professional medical care. Always follow your healthcare professional's instructions.

## 2018-05-01 NOTE — PROGRESS NOTES
[facility] Script completed with patient. Patient reports concerns about [facility] coming out tomorrow doing lab work tomorrow. Called [phone], PANCHITO Escalera. Lali will contact daughter-in law Roland and inform of weekly visits with patients.

## 2018-05-02 ENCOUNTER — TELEPHONE (OUTPATIENT)
Dept: FAMILY MEDICINE | Facility: CLINIC | Age: 68
End: 2018-05-02

## 2018-05-02 NOTE — TELEPHONE ENCOUNTER
----- Message from Alexis Hernandez sent at 5/2/2018  1:39 PM CDT -----  Contact: Herminia from [facility]/[phone]   Herminia called to speak with the nurse to confirm the doctor will oversee the patient's Home Health services.    Please call and advise.

## 2018-05-04 ENCOUNTER — OUTPATIENT CASE MANAGEMENT (OUTPATIENT)
Dept: ADMINISTRATIVE | Facility: OTHER | Age: 68
End: 2018-05-04

## 2018-05-04 ENCOUNTER — TELEPHONE (OUTPATIENT)
Dept: FAMILY MEDICINE | Facility: CLINIC | Age: 68
End: 2018-05-04

## 2018-05-04 NOTE — PROGRESS NOTES
Summary:    LMSW contacted patient via telephone to complete follow up. Patient confirms she received resources mailed to her by Lakia Rivera LCSW. LMSW reviewed resources with patient and reports she has no questions regarding resources provided. Patient reports she has completed application for [facility] and is awaiting their response. Patient reports she will notify outpatient case management when she receives response. Patient denies having any further needs or concerns. LMSW will closed case at this time and notify MONTY and RN CM of case closure.      Interventions:  · Confirmed patient has received resources mailed to her home address.   · Reviewed resources with patient to ensure all questions addressed.      Plan:  LMSW will close case on 5/4/2018  LMSW will notify MONTY and RN CM of case closure

## 2018-05-04 NOTE — TELEPHONE ENCOUNTER
----- Message from Maria Eugenia Davis sent at 5/4/2018  4:21 PM CDT -----  Contact: [phone] [facility]  Nurse just wanted to inform the doctor that the patient called complaining of itching to the pick line site and nothing was wrong with it so she didn't change the dressing. Please call and advise.

## 2018-05-04 NOTE — LETTER
May 4, 2018    Patito Domingo  [address]  [place] [address]             [facility] [address] [place] [address] Dear Patito,    I work with [facility] Outpatient Case Management Department. I have been unsuccessful at reaching you to follow-up to see how you have been doing. If you require any future assistance or if any new concerns or problems arise, please do not hesitate to call.     The Outpatient Case Management Department can be reached at [phone] from 8:00AM to 4:30 PM on Monday thru Friday. [facility] also has a program where a nurse is available 24/7 to answer questions or provide medical advice, their number is [phone].    Thanks,      Lorena Durand,  RN  Outpatient Case Management

## 2018-05-04 NOTE — PROGRESS NOTES
2 nd attempt to update plan of care for OPCM; left message requesting a return call.  As this is my second unsuccessful attempt to complete follow-up for OPCM, I will mail a letter with my contact information.  ENA Durand, OPCM-RN

## 2018-05-07 LAB
ALBUMIN/GLOBULIN RATIO: 1.3
ALBUMIN: 3.3
ALP ISOS SERPL LEV INH-CCNC: 127 U/L
ALT SERPL-CCNC: 27 U/L
AST SERPL-CCNC: 25 U/L
BILIRUBIN, TOTAL: 0.2
BUN BLD-MCNC: 9 MG/DL (ref 4–21)
BUN/CREAT SERPL: NORMAL
CALCIUM SERPL-MCNC: 8.4 MG/DL
CARBON DIOXIDE, CO2: 28
CHLORIDE: 108
CREAT SERPL-MCNC: 0.8 MG/DL
EGFR IF AFRICAN AMERICAN: 88
EST. GFR  (NON AFRICAN AMERICAN): 76 MG/DL
GLOBULIN: 2.6
GLUCOSE: 139
POTASSIUM: 3.4
PROT SNV-MCNC: 5.9 G/L
SED RATE BY MODIFIED WESTERGREN, MANUAL: 18
SODIUM: 145

## 2018-05-09 ENCOUNTER — TELEPHONE (OUTPATIENT)
Dept: ADMINISTRATIVE | Facility: HOSPITAL | Age: 68
End: 2018-05-09

## 2018-05-09 ENCOUNTER — TELEPHONE (OUTPATIENT)
Dept: FAMILY MEDICINE | Facility: CLINIC | Age: 68
End: 2018-05-09

## 2018-05-09 NOTE — TELEPHONE ENCOUNTER
----- Message from Maria Eugenia Davis sent at 5/9/2018  1:02 PM CDT -----  Contact: oziel with [facility] [phone]   Rep wanted to know if they can pull the order for the patient IV antibiotics or if she is finished with the medication. Please call and advise.

## 2018-05-11 ENCOUNTER — OUTPATIENT CASE MANAGEMENT (OUTPATIENT)
Dept: ADMINISTRATIVE | Facility: OTHER | Age: 68
End: 2018-05-11

## 2018-05-11 NOTE — PROGRESS NOTES
Third attempt to update plan of care for OPCM; left voice message to return call.  Will dis-enroll patient at this time.  Case closed.  ENA Durand, OPCM-RN

## 2018-05-15 ENCOUNTER — TELEPHONE (OUTPATIENT)
Dept: FAMILY MEDICINE | Facility: CLINIC | Age: 68
End: 2018-05-15

## 2018-05-15 NOTE — TELEPHONE ENCOUNTER
----- Message from Indigo Mariscal sent at 5/15/2018  1:45 PM CDT -----  Contact: [phone]/self  Patient would like to be seen sooner than the next available appointment. Please advise.

## 2018-05-15 NOTE — TELEPHONE ENCOUNTER
----- Message from Elyssa Griffin sent at 5/15/2018  4:05 PM CDT -----  Contact: [phone]/self  Patient called in returning your call. Please advise.

## 2018-05-17 ENCOUNTER — OFFICE VISIT (OUTPATIENT)
Dept: FAMILY MEDICINE | Facility: CLINIC | Age: 68
End: 2018-05-17
Payer: MEDICARE

## 2018-05-17 VITALS
HEIGHT: 66 IN | SYSTOLIC BLOOD PRESSURE: 142 MMHG | DIASTOLIC BLOOD PRESSURE: 84 MMHG | TEMPERATURE: 99 F | OXYGEN SATURATION: 98 % | BODY MASS INDEX: 25.55 KG/M2 | HEART RATE: 72 BPM | WEIGHT: 159 LBS | RESPIRATION RATE: 18 BRPM

## 2018-05-17 DIAGNOSIS — N17.9 AKI (ACUTE KIDNEY INJURY): ICD-10-CM

## 2018-05-17 DIAGNOSIS — I10 ESSENTIAL HYPERTENSION: Primary | ICD-10-CM

## 2018-05-17 DIAGNOSIS — R60.0 LOWER EXTREMITY EDEMA: ICD-10-CM

## 2018-05-17 PROCEDURE — 99999 PR PBB SHADOW E&M-EST. PATIENT-LVL IV: CPT | Mod: PBBFAC,,, | Performed by: FAMILY MEDICINE

## 2018-05-17 PROCEDURE — 99214 OFFICE O/P EST MOD 30 MIN: CPT | Mod: PBBFAC,PN | Performed by: FAMILY MEDICINE

## 2018-05-17 PROCEDURE — 99214 OFFICE O/P EST MOD 30 MIN: CPT | Mod: S$PBB,,, | Performed by: FAMILY MEDICINE

## 2018-05-17 RX ORDER — HYDROCHLOROTHIAZIDE 12.5 MG/1
12.5 CAPSULE ORAL DAILY
Qty: 30 CAPSULE | Refills: 2 | Status: SHIPPED | OUTPATIENT
Start: 2018-05-17 | End: 2018-07-24

## 2018-05-17 NOTE — PROGRESS NOTES
HPI:  Patito Domingo is a 67 y.o. year old female that  presents fro f/u of hospitalization . She denies that she was in the hospital for any kidney issues . She believes that she was in the hospital for a bladder infection and her blood pressure. She was seen 1 week before for concern about her bladder but was not treated with anything , per pt..  Chief Complaint   Patient presents with    Hospital Follow Up     pt d/c 4/29 [facility]OR[facility]    Foot Swelling   .     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)      Social History     Social History    Marital status:      Spouse name: N/A    Number of children: N/A    Years of education: N/A     Occupational History    disabled       Social History Main Topics    Smoking status: Never Smoker    Smokeless tobacco: Never Used    Alcohol use No    Drug use: No    Sexual activity: Not Currently     Other Topics Concern    Not on file     Social History Narrative    No narrative on file     Past Surgical History:   Procedure Laterality Date    CATARACT EXTRACTION Left 10/2017    CHOLECYSTECTOMY      COLON SURGERY      cystoscope      EYE SURGERY      HYSTERECTOMY  5/1996    LITHOTRIPSY      LIVER BIOPSY  9/14    carcinoid     Family History   Problem Relation Age of Onset    Cancer Mother         unknown    Alzheimer's disease Father     Stroke Sister     No Known Problems Son     No Known Problems Son     No Known Problems Son     No Known Problems Son            Review of Systems  General ROS: negative for chills, fever or weight loss  ENT ROS: negative for epistaxis, sore throat or rhinorrhea  Respiratory ROS: no cough, shortness of breath, or wheezing  Cardiovascular ROS: no chest pain or dyspnea on  "exertion  Gastrointestinal ROS: no abdominal pain, change in bowel habits, or black/ bloody stools    Physical Exam:  BP (!) 142/84 (BP Location: Right arm, Patient Position: Sitting, BP Method: Medium (Manual))   Pulse 72   Temp 99.1 °F (37.3 °C) (Oral)   Resp 18   Ht 5' 6" (1.676 m)   Wt 72.1 kg (159 lb)   SpO2 98%   BMI 25.66 kg/m²   General appearance: alert, cooperative, no distress  Constitutional:Oriented to person, place, and time.appears well-developed and well-nourished.  HEENT: Normocephalic, atraumatic, neck symmetrical, no nasal discharge, TM- clear bilaterally  Lungs: clear to auscultation bilaterally, no dullness to percussion bilaterally  Heart: regular rate and rhythm without rub; no displacement of the PMI , S1&S2 present  Abdomen: soft, non-tender; bowel sounds normoactive; no organomegaly  Physical Exam    LABS:    Complete Blood Count  Lab Results   Component Value Date    RBC 3.21 (L) 04/29/2018    HGB 8.8 (L) 04/29/2018    HCT 28.6 (L) 04/29/2018    MCV 89 04/29/2018    MCH 27.4 04/29/2018    MCHC 30.8 (L) 04/29/2018    RDW 14.9 (H) 04/29/2018     (L) 04/29/2018    MPV 11.1 04/29/2018    GRAN 2.4 04/29/2018    GRAN 53.6 04/29/2018    LYMPH 1.4 04/29/2018    LYMPH 31.0 04/29/2018    MONO 0.5 04/29/2018    MONO 11.9 04/29/2018    EOS 0.1 04/29/2018    BASO 0.02 04/29/2018    EOSINOPHIL 3.1 04/29/2018    BASOPHIL 0.4 04/29/2018    DIFFMETHOD Automated 04/29/2018       Comprehensive Metabolic Panel  Lab Results   Component Value Date    GLU 89 04/29/2018    BUN 9 05/07/2018    CREATININE 0.8 05/07/2018     05/07/2018    K 3.4 05/07/2018     05/07/2018    PROT 5.8 (L) 04/29/2018    ALBUMIN 3.3 05/07/2018    BILITOT 0.1 04/29/2018    AST 25 05/07/2018    ALKPHOS 54 04/29/2018    CO2 28 05/07/2018    ALT 27 05/07/2018    ANIONGAP 5 (L) 04/29/2018    EGFRNONAA 76.0 05/07/2018    ESTGFRAFRICA >60.0 04/29/2018       LIPID  Lab Results   Component Value Date    CHOL 100 (L) " 04/28/2018    HDL 64 04/28/2018         TSH  Lab Results   Component Value Date    TSH 0.896 03/14/2016       Current Outpatient Prescriptions   Medication Sig Dispense Refill    cyanocobalamin 1,000 mcg/mL injection 1,000 mcg every 30 days.   0    diphenoxylate-atropine 2.5-0.025 mg (LOMOTIL) 2.5-0.025 mg per tablet Take 1 tablet by mouth 4 (four) times daily as needed for Diarrhea. 60 tablet 2    ferrous gluconate (FERGON) 324 MG tablet TAKE 1 TABLET EVERY DAY WITH BREAKFAST 30 tablet 2    gabapentin (NEURONTIN) 100 MG capsule TAKE 2 CAPSULES TWICE A DAY (Patient taking differently: takes one capsule BID) 120 capsule 6    lanreotide (SOMATULINE DEPOT) 120 mg/0.5 mL Syrg Inject 120 mg into the skin every 28 days.      losartan (COZAAR) 50 MG tablet TAKE 1 TABLET BY MOUTH DAILY 30 tablet 2    metoprolol tartrate (LOPRESSOR) 50 MG tablet Take 1 tablet (50 mg total) by mouth 2 (two) times daily. 180 tablet 0    nystatin (MYCOSTATIN) 100,000 unit/mL suspension SWISH AND SWALLOW 1 TEASPOON BY MOUTH 4 TIMES A DAY. ONCE RELIEVED CONTINUE FOR 48 HOURS      ondansetron (ZOFRAN) 4 MG tablet Take 1 tablet (4 mg total) by mouth every 6 (six) hours as needed for Nausea. 60 tablet 2    oxyCODONE (ROXICODONE) 30 MG Tab Take 30 mg by mouth every 4 (four) hours as needed.       hydroCHLOROthiazide (MICROZIDE) 12.5 mg capsule Take 1 capsule (12.5 mg total) by mouth once daily. 30 capsule 2    traZODone (DESYREL) 50 MG tablet TK 1 TO 2 TS PO  QHS PRF SLEEP  6     No current facility-administered medications for this visit.        Assessment:    ICD-10-CM ICD-9-CM    1. Essential hypertension I10 401.9 hydroCHLOROthiazide (MICROZIDE) 12.5 mg capsule   2. Lower extremity edema R60.0 782.3 hydroCHLOROthiazide (MICROZIDE) 12.5 mg capsule         Plan:    No Follow-up on file.          Magdalena Hernandez MD

## 2018-05-18 ENCOUNTER — TELEPHONE (OUTPATIENT)
Dept: FAMILY MEDICINE | Facility: CLINIC | Age: 68
End: 2018-05-18

## 2018-05-19 ENCOUNTER — HOSPITAL ENCOUNTER (EMERGENCY)
Facility: HOSPITAL | Age: 68
Discharge: HOME | End: 2018-05-19
Attending: EMERGENCY MEDICINE
Payer: MEDICARE

## 2018-05-19 VITALS
TEMPERATURE: 98 F | OXYGEN SATURATION: 97 % | BODY MASS INDEX: 25.55 KG/M2 | WEIGHT: 159 LBS | DIASTOLIC BLOOD PRESSURE: 76 MMHG | HEART RATE: 78 BPM | HEIGHT: 66 IN | SYSTOLIC BLOOD PRESSURE: 198 MMHG | RESPIRATION RATE: 20 BRPM

## 2018-05-19 DIAGNOSIS — D3A.8 NEUROENDOCRINE TUMOR: ICD-10-CM

## 2018-05-19 DIAGNOSIS — R00.1 BRADYCARDIA: ICD-10-CM

## 2018-05-19 DIAGNOSIS — R10.84 GENERALIZED ABDOMINAL PAIN: Primary | ICD-10-CM

## 2018-05-19 DIAGNOSIS — R60.0 LOCALIZED EDEMA: ICD-10-CM

## 2018-05-19 PROBLEM — R35.0 INCREASED FREQUENCY OF URINATION: Status: ACTIVE | Noted: 2018-05-19

## 2018-05-19 LAB
ALBUMIN SERPL BCP-MCNC: 3.2 G/DL
ALP SERPL-CCNC: 110 U/L
ALT SERPL W/O P-5'-P-CCNC: 11 U/L
ANION GAP SERPL CALC-SCNC: 10 MMOL/L
AST SERPL-CCNC: 13 U/L
BASOPHILS # BLD AUTO: 0.02 K/UL
BASOPHILS NFR BLD: 0.5 %
BILIRUB SERPL-MCNC: 0.7 MG/DL
BILIRUB UR QL STRIP: NEGATIVE
BNP SERPL-MCNC: 111 PG/ML
BUN SERPL-MCNC: 9 MG/DL
CALCIUM SERPL-MCNC: 9.4 MG/DL
CHLORIDE SERPL-SCNC: 98 MMOL/L
CLARITY UR: CLEAR
CO2 SERPL-SCNC: 31 MMOL/L
COLOR UR: YELLOW
CREAT SERPL-MCNC: 0.8 MG/DL
DIFFERENTIAL METHOD: ABNORMAL
EOSINOPHIL # BLD AUTO: 0 K/UL
EOSINOPHIL NFR BLD: 1.1 %
ERYTHROCYTE [DISTWIDTH] IN BLOOD BY AUTOMATED COUNT: 14.9 %
EST. GFR  (AFRICAN AMERICAN): >60 ML/MIN/1.73 M^2
EST. GFR  (NON AFRICAN AMERICAN): >60 ML/MIN/1.73 M^2
GLUCOSE SERPL-MCNC: 119 MG/DL
GLUCOSE UR QL STRIP: NEGATIVE
HCT VFR BLD AUTO: 30.2 %
HGB BLD-MCNC: 9.2 G/DL
HGB UR QL STRIP: NEGATIVE
KETONES UR QL STRIP: NEGATIVE
LEUKOCYTE ESTERASE UR QL STRIP: NEGATIVE
LIPASE SERPL-CCNC: 6 U/L
LYMPHOCYTES # BLD AUTO: 1 K/UL
LYMPHOCYTES NFR BLD: 27.4 %
MCH RBC QN AUTO: 26.4 PG
MCHC RBC AUTO-ENTMCNC: 30.5 G/DL
MCV RBC AUTO: 87 FL
MONOCYTES # BLD AUTO: 0.4 K/UL
MONOCYTES NFR BLD: 10.1 %
NEUTROPHILS # BLD AUTO: 2.2 K/UL
NEUTROPHILS NFR BLD: 60.6 %
NITRITE UR QL STRIP: NEGATIVE
PH UR STRIP: 8 [PH] (ref 5–8)
PLATELET # BLD AUTO: 321 K/UL
PMV BLD AUTO: 10.1 FL
POTASSIUM SERPL-SCNC: 3.5 MMOL/L
PROT SERPL-MCNC: 6.9 G/DL
PROT UR QL STRIP: NEGATIVE
RBC # BLD AUTO: 3.49 M/UL
SODIUM SERPL-SCNC: 139 MMOL/L
SP GR UR STRIP: 1.01 (ref 1–1.03)
TROPONIN I SERPL DL<=0.01 NG/ML-MCNC: <0.006 NG/ML
URN SPEC COLLECT METH UR: NORMAL
UROBILINOGEN UR STRIP-ACNC: NEGATIVE EU/DL
WBC # BLD AUTO: 3.68 K/UL

## 2018-05-19 PROCEDURE — 96361 HYDRATE IV INFUSION ADD-ON: CPT

## 2018-05-19 PROCEDURE — 83690 ASSAY OF LIPASE: CPT

## 2018-05-19 PROCEDURE — 99284 EMERGENCY DEPT VISIT MOD MDM: CPT | Mod: 25

## 2018-05-19 PROCEDURE — 81003 URINALYSIS AUTO W/O SCOPE: CPT

## 2018-05-19 PROCEDURE — 93005 ELECTROCARDIOGRAM TRACING: CPT

## 2018-05-19 PROCEDURE — 25000003 PHARM REV CODE 250: Performed by: EMERGENCY MEDICINE

## 2018-05-19 PROCEDURE — 83880 ASSAY OF NATRIURETIC PEPTIDE: CPT

## 2018-05-19 PROCEDURE — 96375 TX/PRO/DX INJ NEW DRUG ADDON: CPT

## 2018-05-19 PROCEDURE — 63600175 PHARM REV CODE 636 W HCPCS: Performed by: PHYSICIAN ASSISTANT

## 2018-05-19 PROCEDURE — 84484 ASSAY OF TROPONIN QUANT: CPT

## 2018-05-19 PROCEDURE — 96374 THER/PROPH/DIAG INJ IV PUSH: CPT

## 2018-05-19 PROCEDURE — 80053 COMPREHEN METABOLIC PANEL: CPT

## 2018-05-19 PROCEDURE — 25000003 PHARM REV CODE 250: Performed by: PHYSICIAN ASSISTANT

## 2018-05-19 PROCEDURE — 63600175 PHARM REV CODE 636 W HCPCS: Performed by: EMERGENCY MEDICINE

## 2018-05-19 PROCEDURE — 85025 COMPLETE CBC W/AUTO DIFF WBC: CPT

## 2018-05-19 RX ORDER — TRAMADOL HYDROCHLORIDE 50 MG/1
100 TABLET ORAL
Status: COMPLETED | OUTPATIENT
Start: 2018-05-19 | End: 2018-05-19

## 2018-05-19 RX ORDER — HYDROMORPHONE HYDROCHLORIDE 1 MG/ML
1 INJECTION, SOLUTION INTRAMUSCULAR; INTRAVENOUS; SUBCUTANEOUS
Status: COMPLETED | OUTPATIENT
Start: 2018-05-19 | End: 2018-05-19

## 2018-05-19 RX ORDER — OXYCODONE AND ACETAMINOPHEN 7.5; 325 MG/1; MG/1
1 TABLET ORAL ONCE
Status: DISCONTINUED | OUTPATIENT
Start: 2018-05-19 | End: 2018-05-19

## 2018-05-19 RX ORDER — DICYCLOMINE HYDROCHLORIDE 20 MG/1
20 TABLET ORAL 2 TIMES DAILY
Qty: 20 TABLET | Refills: 0 | Status: SHIPPED | OUTPATIENT
Start: 2018-05-19 | End: 2018-06-18

## 2018-05-19 RX ORDER — TRAMADOL HYDROCHLORIDE 50 MG/1
TABLET ORAL
Status: ON HOLD | COMMUNITY
Start: 2018-05-16 | End: 2018-06-30 | Stop reason: HOSPADM

## 2018-05-19 RX ORDER — MORPHINE SULFATE 4 MG/ML
4 INJECTION, SOLUTION INTRAMUSCULAR; INTRAVENOUS
Status: COMPLETED | OUTPATIENT
Start: 2018-05-19 | End: 2018-05-19

## 2018-05-19 RX ORDER — DICYCLOMINE HYDROCHLORIDE 10 MG/1
20 CAPSULE ORAL
Status: COMPLETED | OUTPATIENT
Start: 2018-05-19 | End: 2018-05-19

## 2018-05-19 RX ADMIN — MORPHINE SULFATE 4 MG: 4 INJECTION INTRAVENOUS at 11:05

## 2018-05-19 RX ADMIN — DICYCLOMINE HYDROCHLORIDE 20 MG: 10 CAPSULE ORAL at 11:05

## 2018-05-19 RX ADMIN — HYDROMORPHONE HYDROCHLORIDE 1 MG: 1 INJECTION, SOLUTION INTRAMUSCULAR; INTRAVENOUS; SUBCUTANEOUS at 03:05

## 2018-05-19 RX ADMIN — SODIUM CHLORIDE 500 ML: 9 INJECTION, SOLUTION INTRAVENOUS at 12:05

## 2018-05-19 RX ADMIN — TRAMADOL HYDROCHLORIDE 100 MG: 50 TABLET, COATED ORAL at 02:05

## 2018-05-19 NOTE — ED NOTES
Pt crying without tears, stating that she is hurting in her lower back after CT scan and she wants to go home but she is hurting. Princess PA in to reevaluate. Pt states she will stay if she has to.

## 2018-05-19 NOTE — ED NOTES
Pt resting on stretcher at this time. On cardiac monitor. SR up and CB in reach. No distress noted. Denies pain at this time.

## 2018-05-24 ENCOUNTER — TELEPHONE (OUTPATIENT)
Dept: ADMINISTRATIVE | Facility: CLINIC | Age: 68
End: 2018-05-24

## 2018-06-05 RX ORDER — LOSARTAN POTASSIUM 50 MG/1
TABLET ORAL
Qty: 30 TABLET | Refills: 2 | Status: SHIPPED | OUTPATIENT
Start: 2018-06-05 | End: 2018-09-08 | Stop reason: SDUPTHER

## 2018-06-18 ENCOUNTER — TELEPHONE (OUTPATIENT)
Dept: FAMILY MEDICINE | Facility: CLINIC | Age: 68
End: 2018-06-18

## 2018-06-18 ENCOUNTER — TELEPHONE (OUTPATIENT)
Dept: ADMINISTRATIVE | Facility: OTHER | Age: 68
End: 2018-06-18

## 2018-06-18 NOTE — TELEPHONE ENCOUNTER
Patient called asking for her former . Lorena Durand (Out of the office). I explained she was out of the office. She stated she was mailed an envelope of information that was mailed back and she has not heard from anyone.    I explained I would have someone reach out to her to assist.      Katalina Gore, [facility]  Outpatient Complex Care Mgmt  Ext [phone]

## 2018-06-18 NOTE — TELEPHONE ENCOUNTER
----- Message from Amy Byrd sent at 6/18/2018 10:24 AM CDT -----  Contact: Self [phone]  Patient is running 20-25 minutes late due to severe diarrhea. Please advice

## 2018-06-19 ENCOUNTER — OUTPATIENT CASE MANAGEMENT (OUTPATIENT)
Dept: ADMINISTRATIVE | Facility: OTHER | Age: 68
End: 2018-06-19

## 2018-06-19 NOTE — TELEPHONE ENCOUNTER
Patient has not been taking Microzide 12.5mg by direction of home health nurse; changed in chart. Patient would like to have new order for walker.

## 2018-06-19 NOTE — PROGRESS NOTES
6/19:     Summary:  Initial and RN assessment completed with patient on the phone today. Patient states she is currently living with her son and daughter-n-law who help her with her ADLs as needed. Patient states she currently has [facility] coming to see her as well. Patient states she has Liver tumors and completed her Chemotherapy and is now receiving injections once monthly.  Medication reconciliation completed and patient states the Home Health nurse instructed her to stop taking the fluid pill as it caused her blood pressure to run too low. OPCM will in-basket message to PCP. Patient states the  nurse writes her BP down and she will bring it to PCP at her appointment next week on 6/29. Patient states she still has swelling to both of her legs and tries to keep them elevated on pillows. Patient states she does not have a scale but wants to get one because she is losing weight. Educated on need for scale to weigh at least several times weekly to monitor weight loss or possible gain with fluid. Patient voiced understanding/agreement. Patient stated that she has fallen several times in the last few months as she gets weak from some of her medications. Educated on fall prevention and home safety ie no throw rugs, good lighting, clear pathways and patient voiced understanding. Patient stated that she does use a walker to assist with ambulation and mobility but it is getting old. OPCM will request order from PCP for new walker. Patient stated that she is needing assistance with transportation to go to her MD appointments and would like a Food Stamp application. Patient states she would like help paying for her medications if available. OPCM will refer to Pharmacy assistance program. Encouraged patient to call this RN if having any questions/concerns.  I will mail my contact information should any new problems/concerns arise in the future.  I will mail educational materials regarding infection prevention w/  Immunocompromised patients, Fall prevention. Will refer to LCSW for patient's request for assistance with transportation and Food stamps. OPCM willl continue to follow.        Interventions:  -Educated patient on Home Safety and Fall prevention  -Educated on need for scale to monitor weight  -Mailed education materials on Infection prevention for Immunocompromised patient, Fall prevention  -Referral to LCSW for assistance with transportation and Food stamps  -Referral to PAP  -In-Basket message to MD regarding patient stopped taking fluid pill     Plan:  F/u on receipt of educational materials. Continue to educate about liver CA and need for home scale to monitor weight gain or loss. Encourage patient to follow medication and treatment regimen. Encourage patient to maintain follow up with doctors.   F/u on receipt of educational materials. Continue to educate about fall prevention and safety in home setting. Encourage patient to follow medication and treatment regimen. Encourage patient to maintain follow up with doctors.  F/u on Referral to LCSW for assistance with transportation and Food stamps  F/u on Referral to PAP  F/u on In-Basket message to MD regarding patient stopped taking fluid pill

## 2018-06-19 NOTE — PATIENT INSTRUCTIONS
Nutrition During Chemotherapy     Drink plenty of liquids, such as water.     During chemotherapy, the energy provided by a healthy diet can help you rebuild normal cells. It can also help you keep up your strength and fight infection. As a result, you may feel better and be more able to cope with side effects. Ask your doctor about your nutrition needs.  Drink plenty of fluids  · Fluids help the body produce urine and decrease constipation. They help prevent kidney and bladder problems. They also help replace fluids lost from vomiting and diarrhea.  · Try water, unsweetened juices, and other flavored drinks without caffeine. They flush toxins from the body.  Get enough calories  · Calories are fuel. The body uses this fuel to perform all of its functions, including healing.  · Its OK to be lean, but be sure you are not underweight. If you are, try eating more calories.  · Eat calorie-dense foods such as avocados, peanut butter, eggs, and ice cream.  · If you need extra calories, add butter, gravy, and sauces to foods (if tolerated).  · If you don't need the extra calories, try to limit foods that are fried, greasy, or high in fat or added sugar.  Eat protein, fruits, and vegetables  · Protein builds muscle, bone, skin, and blood. It helps your body heal and fight infection. It also helps boost your energy level.  · Good protein choices include yogurt, eggs, chicken, lean meats, beans, and peanut butter.  · Fruits and vegetables are full of important vitamins, minerals, and fiber to help your body function properly.  · Try to eat a variety of vegetables, fruits, whole grains, and beans.  · Ask your doctor about instant protein powder or other supplements.  Eating right during treatment  Side effects may make it a little harder to eat well on some days. The following tips will help you continue to get the nutrition you need:  · Be open to new foods and recipes.  · Eat small portions often and slowly.  · Have a  healthy snack instead of a meal if you are not very hungry.  · Try eating in a new setting.  · Physical activity, such as walking, can help increase your appetite. Try to be active for at least 30 minutes each day.  · Boost your diet by getting the vitamins and minerals you need from fruits, vegetables, and whole grains.  · If you live alone and are not up to cooking, ask your healthcare provider about Meals on Wheels or other outreach programs.  · Sometimes, it is best to follow your appetitie. Eat when you are hungry, but when you ar enot, forcing yourself to eat can make you feel bad, nauseated, or even cause you to vomit.   Date Last Reviewed: 1/6/2016 © 2000-2017 [employer]. [address] [place] [address], [place] [address]. All rights reserved. This information is not intended as a substitute for professional medical care. Always follow your healthcare professional's instructions.        Skin Care During Chemotherapy     Use sunscreen every time you go outside.     Minor skin problems are common side effects of chemotherapy. These side effects occur because the treatment affects normal cells as well as cancer cells. To manage these side effects, try these tips.  Use mild products  · Use unscented soaps and facial cleansers.  · Use unscented laundry detergent.  · Use baking soda. Add it to your bath water, and apply it instead of deodorant.  · Do not use acne products. These can make skin symptoms worse. These include products with alpha or beta hydroxy acids, retinoids, or benzyl peroxide.  · Avoid skin products that contain alcohol. This includes face cleansers, perfumes, and after-shaves.  · Ask your healthcare team what kind of makeup is best to use on your skin.  Be gentle  · Bathe and wash your hands with warm or cool water, not hot.  · Take short showers or sponge baths.  · Dont scrub - wash gently.  · Apply a water-based unscented cream or lotion on your skin as soon as possible after you bathe  or wash your hands.  Limit sun exposure  · Stay out of direct sun as much as you can.  · Use a sunscreen with SPF 30 or higher if you go outside.  · Wear a hat if you go outside.  Stay comfortable  · Wear clothing and shoes that are comfortable and not tight.  · Wear cotton instead of fabrics that can irritate, such as wool, linen, or synthetics.  · Put padded insoles into your shoes.  · Wear gloves and socks to prevent injury to your hand and foot skin.  When to seek medical advice  Call your healthcare provider right away if any of these occur:  · Rash or hives  · Skin near a cut or rash swells, turns red, feels hot or painful, or begins to ooze   Date Last Reviewed: 1/6/2016  © 9331-1442 [employer]. [address] [place] [address], [place] [address]. All rights reserved. This information is not intended as a substitute for professional medical care. Always follow your healthcare professional's instructions.        Oncology: Controlling Nausea and Vomiting     Taken before meals, medicines can help ease nausea.    Nausea and vomiting are common side effects of chemotherapy and radiation therapy. Side effects happen when treatment changes some normal cells as well as cancer cells. In this case, the cells lining your stomach and the part of your brain that controls vomiting are affected.  Nausea is feeling that you need to throw up. Vomiting is when you actually do throw up. This is when your body forces food that is in your stomach out through your mouth.  Nausea and vomiting are common. They can be caused by many things. These include:  · Stomach flu (gastroenteritis)  · Food poisoning  · Stomach pain (gastritis)  · Blockages in the digestive system  · Constipation  · Infection  · Anxiety and stress  They can also be caused by a head injury, an infection in the brain or inside the ear, or migraines. Other common causes of nausea and vomiting include:  · Brain tumor  · Brain bruise or injury  · Motion  sickness  · Alcohol, pain medicines such as morphine, and cancer medicines (chemotherapy)  · Certain medical treatments, such as radiation therapy  · Poisonous things (toxins) such as plants or liquids that are swallowed by accident  · Advanced types of cancer  · Movement problems (psychogenic problems)  Extra pressure in the fluid that surrounds the brain and spinal cord (elevated intracranial pressure)  Sometimes belly (abdominal) pain and cramps are experienced along with nausea and vomiting. The symptoms can be mild and go away by themselves. Other symptoms can be serious and must be treated.  When to seek medical advice  Nausea and vomiting can happen before, during, or after cancer treatments. But it can be controlled. Dont consider it a normal part of cancer and cancer treatment. If not managed, it can become serious. It can change the fluid and chemical balances in your body, and could even keep you from getting cancer treatment. Call your healthcare provider right away if any of the following occur:  · You have nausea or vomiting that lasts 24 hours or more  · You cant take your antiemetics, or they are not working  · You have trouble keeping fluids down  · You become dizzy, lightheaded, or confused  · You have very dark urine or you stop urinating  Talk to your healthcare provider about your treatment and the nausea and vomiting management plan that's best for you. Be sure you know how and when to use antiemetics and when to call your provider.   Medicines can help  Nausea or vomiting can often be prevented or controlled with medicines called antiemetics. Your provider may give you antiemetics before or after treatment if you are getting chemotherapy or other treatments that cause nausea or vomiting. You may have to try different medicines or different combinations of medicines to get relief. But in nearly all cases, nausea and vomiting can be relieved.  Eating tips  · If you have medicines to control  nausea, take them before meals as directed.  · Avoid fatty or greasy foods while nauseated.  · Eat small meals slowly throughout the day.  · Ask someone to sit with you while you eat to keep you from thinking about feeling nauseated.  · Eat foods at room temperature or colder to avoid strong smells.  · Eat dry foods, such as toast, crackers, or pretzels. Also eat cool, light foods, such as applesauce, and bland foods, such as oatmeal or skinned chicken.  · Try to keep taking in clear fluids in small sips, or as ice chips, gelatin, or ice pops.  Other ways to feel better  · Get a little fresh air. Take a short walk.  · Talk to a friend, listen to music, or watch TV.  · Take a few deep, slow breaths.  · Eat by candlelight or in surroundings that you find relaxing.  · Use a method to help you relax, such as guided imagery. Imagine yourself in a beautiful, restful scene. Or daydream about the place youd most like to be.  Date Last Reviewed: 12/1/2016  © 3997-6834 [employer]. [address] [place] [address], [place] [address]. All rights reserved. This information is not intended as a substitute for professional medical care. Always follow your healthcare professional's instructions.        Oncology: Controlling Diarrhea  A common side effect of cancer and cancer treatment is having loose, watery stools that you have more often than usual (diarrhea). There are many things that can cause diarrhea, such as:  · Chemotherapy  · Radiation therapy to the belly (abdomen) or pelvis  · Antibiotics  · Other medicines you take  · Infection  · Stress  Diarrhea can cause you to quickly become dehydrated and can change the balance of nutrients and minerals in your body. To help limit this problem, try the tips on this handout.     For breakfast, try eating toasted white bread and a banana.   Tips for controlling diarrhea  These steps can help you keep diarrhea from starting or getting worse:  · Limit the amount of fiber in your  diet. Avoid high-fiber foods such as whole-grain bread and brown rice. Instead, eat white bread and rice.  · Eat foods rich in potassium, such as bananas and oranges. This can help replace electrolytes lost due to diarrhea.  · Eat small, frequent meals.  · Drink plenty of fluids. Clear liquids, sports drinks, and flat light sodas such as ginger ale are best. They can help replace fluids lost due to diarrhea, and prevent dehydration.  · Avoid coffee, tea, and alcohol.  · Try not to eat foods that are fried, greasy, spicy, or sweet.  · Limit milk products and the amount of milk you drink.  Medicines can help  Ongoing diarrhea is not something you have to live with and it can become dangerous. Talk with your healthcare provider about your risk for diarrhea, what you can do to try to prevent it, and what you should do if it starts. You may be given medicine to stop diarrhea or help keep it from starting. Your healthcare provider may also suggest using an ointment to soothe irritation. Keeping the anal area clean with a mild soap or baby wipes helps as well. Dont take any over-the-counter product without asking your provider first.  When to see your healthcare provider  See your healthcare provider if any of the following occur:  · The diarrhea lasts more than 24 to 48 hours.  · There is blood in your stool or when you wipe.  · You have pain in your belly.  · You become lightheaded or dizzy.  · Your urine becomes very dark or you stop urinating.  · The medicine you were given to stop diarrhea is not working.   Date Last Reviewed: 12/1/2016  © 3811-9834 [employer]. [address] [place] [address], [place] [address]. All rights reserved. This information is not intended as a substitute for professional medical care. Always follow your healthcare professional's instructions.        Preventing the Spread of Infection  Certain infections can spread from person to person. This is why your friend or family member may be  put in a special room while in the hospital. Restrictions may be placed on who can go in and out of that room and what protection must be worn. Read this sheet to better understand why this is done.     Practice good hand hygiene to help stop the spread of germs.     Practice good hand hygiene to help stop the spread of germs.   How infection spreads  Infection is caused by germs. An infected person carries germs that he or she can give to others. Even a person who doesnt feel sick can still carry and spread germs. Germs can cause infection by traveling through the air or through direct contact or when each of you touch the same surface in a room such as a doorknob, sink faucet, tabletop or chair.   How you can become infected  To infect you, germs first have to get inside your body. You can get infected by touching a contaminated person or object. You can also get infected by breathing contaminated air. This is why good hand hygiene and other infection control recommendations, like wearing a mask, are so important. Hand hygiene refers to handwashing with soap and water or the use of alcohol-based gels or foams that do not need using water.  Preventing infection  To stop infection from spreading, healthcare workers may do one or more of the following:  · Place an infected patient in a private room, or in a room with others who have exactly the same infection. (This depends on what kind of infection the patient has.)  · Wear a mask, gloves, gown, or other items.  · Wear a respirator (air filter) for some infections.  What you can do  Heres how to help stop the spread of infection:  · You may be asked to wear a mask, gloves, gown, or respirator when you visit. Follow any instructions carefully.  · Practice good hand hygiene, especially after using the bathroom and before and after touching the patient or the patient surroundings.  · Keep your hands away from your face.  · Cough or sneeze only into a tissue.  · Do  "not use the patients bathroom.  · Do not visit a patient if you feel sick. Do not visit if you have been exposed to an illness such as the flu, chickenpox, or measles.  Date Last Reviewed: 12/1/2016 © 2000-2017 "[employer]. [address] [place] [id number]. All rights reserved. This information is not intended as a substitute for professional medical care. Always follow your healthcare professional's instructions.        Bladder Infection, Female (Adult)    Urine is normally doesn't have any bacteria in it. But bacteria can get into the urinary tract from the skin around the rectum. Or they can travel in the blood from elsewhere in the body. Once they are in your urinary tract, they can cause infection in the urethra (urethritis), the bladder (cystitis), or the kidneys (pyelonephritis).  The most common place for an infection is in the bladder. This is called a bladder infection. This is one of the most common infections in women. Most bladder infections are easily treated. They are not serious unless the infection spreads to the kidney.  The phrases "bladder infection," "UTI," and "cystitis" are often used to describe the same thing. But they are not always the same. Cystitis is an inflammation of the bladder. The most common cause of cystitis is an infection.  Symptoms  The infection causes inflammation in the urethra and bladder. This causes many of the symptoms. The most common symptoms of a bladder infection are:  · Pain or burning when urinating  · Having to urinate more often than usual  · Urgent need to urinate  · Only a small amount of urine comes out  · Blood in urine  · Abdominal discomfort. This is usually in the lower abdomen above the pubic bone.  · Cloudy urine  · Strong- or bad-smelling urine  · Unable to urinate (urinary retention)  · Unable to hold urine in (urinary incontinence)  · Fever  · Loss of appetite  · Confusion (in older adults)  Causes  Bladder infections are not " contagious. You can't get one from someone else, from a toilet seat, or from sharing a bath.  The most common cause of bladder infections is bacteria from the bowels. The bacteria get onto the skin around the opening of the urethra. From there, they can get into the urine and travel up to the bladder, causing inflammation and infection. This usually happens because of:  · Wiping improperly after urinating. Always wipe from front to back.  · Bowel incontinence  · Pregnancy  · Procedures such as having a catheter inserted  · Older age  · Not emptying your bladder. This can allow bacteria a chance to grow in your urine.  · Dehydration  · Constipation  · Sex  · Use of a diaphragm for birth control   Treatment  Bladder infections are diagnosed by a urine test. They are treated with antibiotics and usually clear up quickly without complications. Treatment helps prevent a more serious kidney infection.  Medicines  Medicines can help in the treatment of a bladder infection:  · Take antibiotics until they are used up, even if you feel better. It is important to finish them to make sure the infection has cleared.  · You can use acetaminophen or ibuprofen for pain, fever, or discomfort, unless another medicine was prescribed. If you have chronic liver or kidney disease, talk with your healthcare provider before using these medicines. Also talk with your provider if you've ever had a stomach ulcer or gastrointestinal bleeding, or are taking blood-thinner medicines.  · If you are given phenazopydridine to reduce burning with urination, it will cause your urine to become a bright orange color. This can stain clothing.  Care and prevention  These self-care steps can help prevent future infections:  · Drink plenty of fluids to prevent dehydration and flush out your bladder. Do this unless you must restrict fluids for other health reasons, or your doctor told you not to.  · Proper cleaning after going to the bathroom is important.  Wipe from front to back after using the toilet to prevent the spread of bacteria.  · Urinate more often. Don't try to hold urine in for a long time.  · Wear loose-fitting clothes and cotton underwear. Avoid tight-fitting pants.  · Improve your diet and prevent constipation. Eat more fresh fruit and vegetables, and fiber, and less junk and fatty foods.  · Avoid sex until your symptoms are gone.  · Avoid caffeine, alcohol, and spicy foods. These can irritate your bladder.  · Urinate right after intercourse to flush out your bladder.  · If you use birth control pills and have frequent bladder infections, discuss it with your doctor.  Follow-up care  Call your healthcare provider if all symptoms are not gone after 3 days of treatment. This is especially important if you have repeat infections.  If a culture was done, you will be told if your treatment needs to be changed. If directed, you can call to find out the results.  If X-rays were done, you will be told if the results will affect your treatment.  Call 911  Call 911 if any of the following occur:  · Trouble breathing  · Hard to wake up or confusion  · Fainting or loss of consciousness  · Rapid heart rate  When to seek medical advice  Call your healthcare provider right away if any of these occur:  · Fever of 100.4ºF (38.0ºC) or higher, or as directed by your healthcare provider  · Symptoms are not better by the third day of treatment  · Back or belly (abdominal) pain that gets worse  · Repeated vomiting, or unable to keep medicine down  · Weakness or dizziness  · Vaginal discharge  · Pain, redness, or swelling in the outer vaginal area (labia)  Date Last Reviewed: 10/1/2016  © 8444-1267 [employer] [address] [place] [address]. All rights reserved. This information is not intended as a substitute for professional medical care. Always follow your healthcare professional's instructions.        Candida Infection: Thrush  Thrush is a type  of fungal infection in the mouth and throat. Thrush does not usually affect healthy adults. It is more common in people with a weakened immune system. It is also more likely if you take antibiotics. Thrush is normally not contagious.  Understanding fungus in the mouth and throat  Your mouth and throat normally contain millions of tiny organisms. These include bacteria and yeasts. Many of these do not cause any problems. In fact, they may help fight disease.  Yeasts are a type of fungus. A type of yeast called Candida normally lives on your skin. It is also found on the membranes of your mouth and throat. Usually, this yeast grows only in small amounts and is harmless. But in some cases, Candida can grow out of control and cause thrush. Thrush is related to other kinds of Candida infections that can grow all over the body. Thrush refers to an infection of only the mouth and throat.  What causes thrush?  Thrush happens when something lets too much Candida grow inside your mouth and throat. Certain things that change the normal balance of organisms in the mouth can lead to thrush. One example is antibiotic medicine. This medicine may kill some of the normal bacteria in your mouth. Candida can then grow freely. People on antibiotics have an increased risk for thrush.  You have a higher risk for thrush if you:  · Wear dentures  · Are getting chemotherapy  · Are getting radiation therapy  · Have diabetes  · Have a transplanted organ  · Use corticosteroids  · Have a weakened immune system, such as from AIDS  · Are an older adult  Symptoms of thrush  Some people with thrush do not have any symptoms. Symptoms of thrush can include:  · A dry, cottony feeling in your mouth  · Loss of taste  · Pain while eating or swallowing  · White or red patches inside your mouth or on the back of your throat  Diagnosing thrush  Your health care provider will ask about your medical history and your symptoms. He or she will look closely at  your mouth and throat. White or red patches will be scraped with a tongue depressor. The sample will be sent to a lab to test. This test can usually confirm thrush.  If you have thrush, you may also have esophageal candidiasis. This is common in people who have HIV. Your health care provider may check for this condition with an upper endoscopy. This is a procedure to look at the esophagus. A tissue sample may be taken to test.  Treatment for thrush  It is important to treat thrush early. Untreated, it may turn into a more serious form of widespread infection. Thrush is usually treated with antifungal medicine. The medicine is put directly in your mouth and throat. You may be given a swish and swallow medicine or an antifungal lozenge.  In some cases, you may need an antifungal pill. This can remove Candida throughout your body. Or you may need medicine through an IV. These treatments depend on how severe your infection is, and what other health conditions you have.  If you are at high risk for thrush, you may need to keep taking oral antifungal medicine. This is to help prevent thrush in the future.  What happens if you dont get treated for thrush?  If untreated, the Candida may spread throughout your body. They may even enter your bloodstream. This can cause serious problems, such as organ failure and even death. Bloodstream infection may need to be treated with high doses of antifungal medicine through an IV.  Systemic infection is much more likely in people who are very ill. It is also more common in those who have serious problems with their immune system. Additional risk factors for systemic infection in very ill people include:  · Central venous lines  · IV nutrition  · Broad-spectrum antibiotics  · Kidney failure  · Recent surgery  Preventing thrush  You may be able to help prevent some cases of thrush. Make sure to:  · Practice good oral hygiene. Try using a chlorhexidine mouthwash.  · Clean your dentures  regularly as instructed. Make sure they fit you correctly.  · After using a corticosteroid inhaler, rinse out your mouth with water or mouthwash.  · Do not use broad-spectrum antibiotics, if possible.  · Get treated for health problems that increase your risk for thrush, such as diabetes.     When to call the health care provider  Call your health care provider right away if you have any of these:  · Cottony feeling in your mouth  · Loss of taste  · Pain while eating or swallowing  · White or red patches inside your mouth or on the back of your throat   Date Last Reviewed: 3/19/2015  © 3187-6867 [employer]. [address] [place] [address], [place] [address]. All rights reserved. This information is not intended as a substitute for professional medical care. Always follow your healthcare professional's instructions.        Preventing Falls: Are You At Risk of Falling?     Ask for help to reduce risk of falling in your home.     As you get older, you're not as steady on your feet as you once were. And you may have health problems you didn't have when you were younger. So, it's not surprising that older people are more likely to trip and fall. Falling can be very serious. It can change your overall health and quality of life. That's why it's important to be aware of your own risk of falling.  The dangers of falling  Falls are one of the main causes of injury in people over age 65. An older person who falls may take longer to get better than a younger person. And, after a fall, an older person is more likely to have problems that don't go away. So, preventing falls can help you avoid serious health problems.  Are you at risk of falling?  Answer these questions to rate your level of risk.  · Are you a woman?  · Have you fallen or stumbled in the last year?  · Are you over age 65?  · Are you ever dizzy or lightheaded with standing?  · Do you have a hard time getting in and out of the bathtub or on and off the  "toilet?  · Do you lean on objects to help you get around? Or do you use a cane or walker?  · Do you have vision or hearing problems? For example, do you need new glasses or hearing aids?  · Do you have 2 or more long-lasting (chronic) medical conditions?  · Do you take 3 or more medicines?  · Have you felt depressed recently?  · Have you had more trouble with your memory in recent months?  · Are there hazards in your home that might cause you to fall, such as loose rugs or poor lighting?  · Do you have a pet that jumps on you or might trip you?  · Have you stopped getting regular exercise?  · Do you have diabetes?   · Do you have a neurologic disease, such as Parkinson or Alzheimer disease?   · Do you drink alcohol?  · Do you wear athletic shoes or slippers, or go barefoot at home?  You can help prevent falls  If you answered "yes" to any of the above questions, you should take steps to reduce your risk of a fall. Monitoring health conditions and keeping walkways in your home free of clutter are just 2 ways. Changing is sometimes easier said than done. But keep in mind that even small changes can make you less likely to fall.  The fear of falling  It's normal to be scared of falling, especially if you've fallen before. But being afraid can actually make you more likely to fall. This is because:  · Fear might cause you to become less active. Being less active can lead to a loss of strength and balance.  · Fear can lead to isolation from others, depression, or the use of more medicines or alcohol. And all these things make falling even more likely.  To break the cycle, learn more about ways to avoid falling. As you take control, you may find yourself feeling less afraid.   Date Last Reviewed: 6/12/2015  © 2371-6566 [employer]. [address] [place] [address], [place] [address]. All rights reserved. This information is not intended as a substitute for professional medical care. Always follow your healthcare " professional's instructions.        Preventing Falls: Making Changes in Your Living Space  Is your living space filled with hazards that could cause you to fall? Changes can make you safer. They could even save your life. Take a careful look around your home. Change what you can on your own. Hire someone or ask friends or family to help with harder tasks.      Be sure to add a nonslip mat to the inside of your shower or bathtub. Always keep a nightlight on. Keep a clear path from your bed to the bathroom. Move items from higher shelves to lower ones.   Remove hazards  · Remove things that can trip you, like throw rugs, boxes, piles of paper, or cords.  · Nail down rugs or carpeting if you don't want to remove them.  · Don't store items on stairs.  · Keep walkways clear.  · Clean up spills right away.  · Replace glass tables with wooden ones. They're safer if you fall.  Add safety devices  · Add handrails to both sides of stairs.  · Buy a raised toilet seat.  · Add grab bars near the toilet and in the shower.  · Get grabbers to help you reach things and avoid climbing.  Improve lighting  · Add nightlights to halls, bedrooms, and bathrooms.   · Put light switches at the top and bottom of stairs.  · Be sure each room and flight of stairs has proper lighting.  · Use shades or curtains to cut glare from windows.  · Put flashlights in each room. Replace burned-out bulbs.  · Get glowing light switches for room entrances.  Take other precautions  · Use nonskid floor wax.  · Buy a nonslip mat and a liquid soap dispenser for the shower.  · Tack down carpets or use slip-resistant backing.  · Put most-used items within easy reach.  · Add bright paint or tape on the top front edge of steps.  · Save big jobs, such as moving furniture or other heavy objects, for family or friends.  · Get professional help installing grab bars. They can be unsafe if not installed the right way.  Fix riskier rooms first  Don't tackle everything at  once. Focus on one room at a time. The bathroom is a common spot for falls, so you may start there. Or start with a room you spend lots of time in, such as your bedroom. Make only a few changes at once. This will give you time to adjust to them.     Outside your home  You might arrange for these changes yourself, or you might need to talk to your  or homeowners' association about them.  · Have loose boards on porches or damaged stairs repaired.  · Have rough edges, holes, or large cracks in sidewalks or driveways repaired.  · Have hazards that could trip you, such as hoses or adele, removed.  · Use high-wattage light bulbs (100 or greater) near outside doors and stairs.  · Get handrails added to outside stairs. Have them extend beyond the bottom step.  · Get help in winter weather with ice or snow removal.   Date Last Reviewed: 6/12/2015  © 0633-3337 [employer]. [address] [place] [address], [place] [address]. All rights reserved. This information is not intended as a substitute for professional medical care. Always follow your healthcare professional's instructions.

## 2018-06-19 NOTE — TELEPHONE ENCOUNTER
----- Message from Ashley Kim RN sent at 6/19/2018  3:25 PM CDT -----  Hi. This is Ashley Kim RN. I am with the Outpatient case management team with [facility]. I received a referral on the above patient. I spoke with patient today on the telephone.    Medication Discrepancy: Hydrochlorothiazide 12.5 mg, Fergon 324 mg     Patient reports Home Health Nurse ([facility]) instructed her to stop taking Macrozid 12.5 mg as it was making her Blood pressure too low but she still has a lot of swelling in her legs. No c/ SOB.     Patient would like order for new walker as hers is broken    Please notify patient of your recommendations.     Thank you,  Ashley Kim RN

## 2018-06-19 NOTE — LETTER
June 19, 2018    Patito Domingo  [address]  [facility] [address]             [facility] [address] [place] [address] Dear Ms. Domingo         Here are the educational and resource materials that I believe you may find useful. Please take your time to review them. Do not hesitate to call me for any questions or concerns.      General appointment scheduling [phone]  [facility]/7 (nurse line) [phone]    Sincerely,     Ashley Kim RN   Outpatient Case Management  [facility]  [phone]

## 2018-06-19 NOTE — TELEPHONE ENCOUNTER
Patient states has been on antibiotics for two weeks and has developed a yeast infection. Patient states she has been using OTC cream but she is still having some irritation. Patient would like to have prescription for Diflucan.

## 2018-06-20 ENCOUNTER — OUTPATIENT CASE MANAGEMENT (OUTPATIENT)
Dept: ADMINISTRATIVE | Facility: OTHER | Age: 68
End: 2018-06-20

## 2018-06-20 NOTE — TELEPHONE ENCOUNTER
----- Message from Lilibeth Flowers sent at 6/20/2018  9:44 AM CDT -----  No. [phone]    Patient has a yeast infection.   Medication is not at [facility] in [place] yet.

## 2018-06-20 NOTE — PROGRESS NOTES
Thank you for the referral. The following patient has been assigned to Ashley Kim RN with Outpatient Complex Care Management for high risk screening.    Reason for referral:     Please contact [facility] at [phone] with any questions.    Thank you,    Katalina Gore, [facility]  Outpatient Complex Care Mgmt  Ext [phone]

## 2018-06-21 ENCOUNTER — OUTPATIENT CASE MANAGEMENT (OUTPATIENT)
Dept: ADMINISTRATIVE | Facility: OTHER | Age: 68
End: 2018-06-21

## 2018-06-21 DIAGNOSIS — N76.0 ACUTE VAGINITIS: Primary | ICD-10-CM

## 2018-06-21 RX ORDER — FLUCONAZOLE 150 MG/1
150 TABLET ORAL DAILY
Qty: 1 TABLET | Refills: 0 | Status: SHIPPED | OUTPATIENT
Start: 2018-06-21 | End: 2018-06-22

## 2018-06-21 RX ORDER — DIPHENOXYLATE HYDROCHLORIDE AND ATROPINE SULFATE 2.5; .025 MG/1; MG/1
TABLET ORAL
Qty: 60 TABLET | Refills: 2 | Status: SHIPPED | OUTPATIENT
Start: 2018-06-21 | End: 2018-07-27 | Stop reason: SDUPTHER

## 2018-06-21 RX ORDER — FLUCONAZOLE 150 MG/1
150 TABLET ORAL WEEKLY
Qty: 2 TABLET | Refills: 0 | Status: SHIPPED | OUTPATIENT
Start: 2018-06-21 | End: 2018-06-22

## 2018-06-22 ENCOUNTER — OUTPATIENT CASE MANAGEMENT (OUTPATIENT)
Dept: ADMINISTRATIVE | Facility: OTHER | Age: 68
End: 2018-06-22

## 2018-06-22 NOTE — LETTER
June 22, 2018    Patito S Mayurmijossy  [address]  [place] [address]             [facility] [address] [place] [address] Dear Ms. Merchantmijossy:    I enjoyed talking you today. I have enclosed the resources that we discussed, transportation through [facility] and a food stamp application. I look forward to talking to you next week.      If you have any questions or concerns, please don't hesitate to call. I can be reached at [phone], Monday - Friday, 8:00am - 4:30pm.     Sincerely,        CHARLOTTE Espinal

## 2018-06-22 NOTE — PROGRESS NOTES
SUMMARY  LCSW received a referral from OPCM RN for transportation resources and application for [employer] [place].  LCSW spoke with patient to complete social work assessment. Patient is a 68 yo  female who is currently receiving cancer treatments. She currently lives with her son, daughter in law and their 3 children, ages 14, 5, and 4. She is on Social Security disability. Discussed referrals for transportation and [employer] [place]. She stated that she does not anticipate being able to receive [employer] [place] due to her son's family income. She related that she tries to be financially independent even though she lives in their home and they provide for her needs. She stated that she does have transportation needs to go to her MD appointments. Informed her of services through [facility]. She stated that she would like information about those services.     INTERVENTIONS  1. Complete social work assessment.   2. Completed community search for available resources for transportation.   3. Mailed information regarding transportation through [facility] and SNAP (food stamp) application.     PLAN  Follow up scheduled for 6/29/2018/   1.  Insure that patient received mailing.   2. Assess need for assistance with pursing resources.

## 2018-06-27 ENCOUNTER — OUTPATIENT CASE MANAGEMENT (OUTPATIENT)
Dept: ADMINISTRATIVE | Facility: OTHER | Age: 68
End: 2018-06-27

## 2018-06-27 ENCOUNTER — TELEPHONE (OUTPATIENT)
Dept: NEUROLOGY | Facility: HOSPITAL | Age: 68
End: 2018-06-27

## 2018-06-27 NOTE — TELEPHONE ENCOUNTER
----- Message from Ashley Kim RN sent at 6/27/2018  9:42 AM CDT -----  PANCHITO Ayala Staff    Hi. This is Ashley Kim RN. I am with the Outpatient case management team with [facility]. I spoke with patient today on the telephone.   Patient states she needs a Rollater (Rolling walker with attached seat) and PCP was going to order. Patients states she has not heard from DME.     Medication Discrepancy: Hydrochlorothiazide 12.5 mg, Fergon 324 mg     Patient reports Home Health Nurse ([facility]) instructed her to stop taking Macrozid 12.5 mg as it was making her Blood pressure too low but she still has a lot of swelling in her legs. No c/ SOB.     Please notify patient of your recommendations.     Thank you,   Ashley Kim RN

## 2018-06-27 NOTE — TELEPHONE ENCOUNTER
Will forward to Dr. Magdalena Hernandez' office as patient has not seen Dr. Perkins since 2014.

## 2018-06-27 NOTE — PATIENT INSTRUCTIONS
"  [place] Diet  Your healthcare provider may recommend a bland diet if you have an upset stomach. It consists of foods that are mild and easy to digest. It is better to eat small frequent meals rather than 3 large meals a day.    Beverages  OK: Fruit juices, non-caffeinated teas and coffee, non-carbonated luis  Avoid: Carbonated beverage, caffeinated tea and coffee, all alcoholic beverages  Bread  OK: Refined white, wheat or rye bread, aleyda or soda crackers, Gretchen toast, plain rolls, bagels  Avoid: Whole-grain bread  Cereal  OK: Refined cereals: cooked or ready to eat  Avoid: Whole-grain cereals and granola, or those containing bran, seeds or nuts  Desserts  OK: Peanut butter and all others except those to "avoid"  Avoid: Chocolate, cocoa, coconut, popcorn, nuts, seeds, jam, marmalade  Fruits  OK: Canned, cooked, frozen or fresh fruits without seeds or tough skin  Avoid: Olives, skin and seeds of fruit  Meats  OK: All fresh or preserved meat, fish and fowl  Avoid: Any that are prepared with those spices to "avoid"  Cheese and eggs  OK: Eggs, cottage cheese, cream cheese, other cheeses  Avoid: All cheeses made with those spices to "avoid"  Potatoes and pasta  OK: Potato, rice, macaroni, noodles, spaghetti  Avoid: None  Soups  OK: All soups without heavy seasoning  Avoid: Soups made with those spices to "avoid"  Vegetables  OK: Canned, cooked, fresh or frozen mildly flavored vegetables without seeds, skins or coarse fiber  Avoid: Vegetables prepared with those spices to "avoid"; skin and seeds of vegetables and those with coarse fiber  Spices  OK: Salt, lemon and lime juice, vinegar, all extracts, lobito, cinnamon, thyme, mace, allspice, paprika  Avoid: Chili powder, cloves, pepper, seed spices, garlic, gravy pickles, highly seasoned salad dressings  Date Last Reviewed: 11/20/2015  © 7308-6809 [employer]. [address] [place] [address]. All rights reserved. This information is not intended as " a substitute for professional medical care. Always follow your healthcare professional's instructions.

## 2018-06-27 NOTE — PROGRESS NOTES
6/27   Summary:  Contacted patient by phone for follow up visit today. Patient states she spoke to [facility] and is waiting information on [employer] [place] and available transportation to come in the mail. Patient states she has not heard from MD office or DME regarding taking her Microzide or getting a walker. OPCM will send another in basket message to MD regarding same. Patient states she has not received educational materials in the mail yet. Patient states she still has swelling to both of her legs and will discuss medications with MD at her appointment on Friday. Patient denies falls since our last visit. Educated on fall prevention and home safety ie no throw rugs, good lighting, clear pathways and patient voiced understanding.Patient denies fever, cough, SOB. Educated on s/s Infection and patient voiced understanding. Patient reports she is still having Diarrhea and is taking her Lomotil as directed 4 times daily Discussed BRAT Diet with patient and she voiced understanding. OPCM will mail educational materials on diarrhea. Encouraged patient to call this RN if having any questions/concerns. OPCM willl continue to follow.       Interventions:  -Educated patient on Home Safety and Fall prevention and Infection prevention for Immunocompromised patient. Encouraged patient to follow medication and treatment regimen. Encouraged patient to maintain follow up with doctors. Educated on BRAT diet for diarrhea.   -Sent another n-Basket message to PCP regarding patient stopped taking fluid pill and need for walker     Plan:  F/u on receipt of educational materials. Continue to educate about Home Safety and Fall prevention and Infection prevention for Immunocompromised patient.  Encourage patient to follow medication and treatment regimen. Encourage patient to maintain follow up with doctors.   F/u on Referral to PAP  F/u on In-Basket message to MD regarding patient stopped taking fluid pill and need for walker

## 2018-06-28 ENCOUNTER — TELEPHONE (OUTPATIENT)
Dept: FAMILY MEDICINE | Facility: CLINIC | Age: 68
End: 2018-06-28

## 2018-06-28 NOTE — TELEPHONE ENCOUNTER
----- Message from Indigo Mariscal sent at 6/28/2018  9:52 AM CDT -----  Contact: Larisa with [facility] [phone]   Larisa with [facility] would like to speak with you about pt's severe diarrhea. Please advise.

## 2018-06-28 NOTE — TELEPHONE ENCOUNTER
Patient states she would like to have rollater with chair ordered. She also is having edema in legs but was taking off of HCTZ 12.5 please advise.

## 2018-06-29 ENCOUNTER — OUTPATIENT CASE MANAGEMENT (OUTPATIENT)
Dept: ADMINISTRATIVE | Facility: OTHER | Age: 68
End: 2018-06-29

## 2018-06-29 PROBLEM — K52.9 COLITIS: Status: RESOLVED | Noted: 2018-01-04 | Resolved: 2018-06-29

## 2018-06-29 PROBLEM — K85.10 ACUTE BILIARY PANCREATITIS WITHOUT INFECTION OR NECROSIS: Status: RESOLVED | Noted: 2017-12-29 | Resolved: 2018-06-29

## 2018-06-29 PROBLEM — N17.9 AKI (ACUTE KIDNEY INJURY): Status: RESOLVED | Noted: 2018-04-26 | Resolved: 2018-06-29

## 2018-06-29 PROBLEM — R20.0 LEFT ARM NUMBNESS: Status: RESOLVED | Noted: 2018-04-28 | Resolved: 2018-06-29

## 2018-06-29 PROBLEM — R20.0 LEFT FACIAL NUMBNESS: Status: RESOLVED | Noted: 2018-04-28 | Resolved: 2018-06-29

## 2018-06-29 NOTE — PROGRESS NOTES
Attempted follow up with patient. Patient admitted to the hospital. Follow up after patient discharged.

## 2018-06-30 PROBLEM — K83.1 PANCREATITIS DUE TO BILIARY OBSTRUCTION: Status: RESOLVED | Noted: 2017-12-29 | Resolved: 2018-06-30

## 2018-06-30 PROBLEM — K85.90 PANCREATITIS DUE TO BILIARY OBSTRUCTION: Status: RESOLVED | Noted: 2017-12-29 | Resolved: 2018-06-30

## 2018-06-30 PROBLEM — R52 INTRACTABLE PAIN: Status: RESOLVED | Noted: 2018-01-05 | Resolved: 2018-06-30

## 2018-07-02 ENCOUNTER — OUTPATIENT CASE MANAGEMENT (OUTPATIENT)
Dept: ADMINISTRATIVE | Facility: OTHER | Age: 68
End: 2018-07-02

## 2018-07-02 ENCOUNTER — TELEPHONE (OUTPATIENT)
Dept: FAMILY MEDICINE | Facility: CLINIC | Age: 68
End: 2018-07-02

## 2018-07-02 NOTE — TELEPHONE ENCOUNTER
----- Message from Simran Alicea sent at 7/2/2018  2:50 PM CDT -----  Contact: [phone]/self  Patient is returning your call. Please advise.

## 2018-07-02 NOTE — PROGRESS NOTES
SUMMARY  LCSW phoned patient. Patient reviewed hospitalization and verified follow up appointment for 7/9/18, 1:30pm. Verified that she had received information on [employer] [place] and transportation. Reviewed referral process. Patient noted that she had been approved for Medicaid, effective 7/1/18. She stated that she is glad that she will have prescription assistance and transportation through Medicaid.   She stated that she will be going to her younger son's home in [place] while her older son in on vacation next week. She stated that she has notified home health. Her home health nurse is visiting her today. Patient plans to have her antibiotic which was prescribed in the hospital today. Encouraged her to review this and her symptoms of retaining fluid, diarrhea, and vomiting. She agreed that she will review with her nurse who is scheduled to visit between 11 am and 1 pm.     INTERVENTION  Provided supportive counseling.   Provided information regarding Medicaid transportation. Mailed Medicaid transportation contact information.     PLAN  Follow up on 7/9/18.

## 2018-07-03 ENCOUNTER — OUTPATIENT CASE MANAGEMENT (OUTPATIENT)
Dept: ADMINISTRATIVE | Facility: OTHER | Age: 68
End: 2018-07-03

## 2018-07-03 NOTE — PROGRESS NOTES
7/3   Summary:  Contacted patient by phone for follow up visit today. Patient states she received educational materials in the mail  And has been looking at them. Patient states the swelling in her legs has improved and she continues to take medications as directed. Patient denies falls since our last visit. Educated on fall prevention and home safety ie no throw rugs, good lighting, clear pathways and patient voiced understanding. Patient states she still has not heard from MD office or DME regarding getting a new walker OPCM will send another in basket message to MD regarding same. Educated on s/s infection to watch for ie. fever, chills, cough. Patient reports she continues to experience some diarrhea and is taking her Lomotil as directed. OPCM reminded patient of bland diet and BRAT diet and she voiced understanding. Patient states Home Health continues to visit patient and nurse came yesterday. Patient is still taking antibiotics for bladder infection after recent hospitalization for same. Reminded patient of upcoming appointments with Dr. Rocha on 7/9 and Dr. Magdalena Hernandez on 7/ 24 and she is aware. Encouraged patient to call this RN if having any questions/concerns. OPCM willl continue to follow.       Interventions:  -Educated patient on Home Safety and Fall prevention and Infection prevention for Immunocompromised patient. Encouraged patient to follow medication and treatment regimen. Encouraged patient to maintain follow up with doctors. Educated on BRAT diet for diarrhea.   -Sent another n-Basket message to PCP regarding need for walker     Plan:  F/u on receipt of educational materials. Continue to educate about Home Safety and Fall prevention and Infection prevention for Immunocompromised patient.  Encourage patient to follow medication and treatment regimen. Encourage patient to maintain follow up with doctors.   F/u on In-Basket message to MD regarding need for walker

## 2018-07-06 ENCOUNTER — TELEPHONE (OUTPATIENT)
Dept: NEUROLOGY | Facility: HOSPITAL | Age: 68
End: 2018-07-06

## 2018-07-06 NOTE — TELEPHONE ENCOUNTER
----- Message from Angela Cuba sent at 7/6/2018  2:52 PM CDT -----  Contact: [facility] [employer], Larisa  [facility] , Larisa [phone] ext [phone] called to inform the office that the patient states she has been throwing up. Patient is scheduled to see the doctor on Monday. Please call Larisa if you have any questions.

## 2018-07-09 ENCOUNTER — INITIAL CONSULT (OUTPATIENT)
Dept: NEUROLOGY | Facility: HOSPITAL | Age: 68
End: 2018-07-09
Attending: INTERNAL MEDICINE
Payer: MEDICARE

## 2018-07-09 ENCOUNTER — OUTPATIENT CASE MANAGEMENT (OUTPATIENT)
Dept: ADMINISTRATIVE | Facility: OTHER | Age: 68
End: 2018-07-09

## 2018-07-09 VITALS
WEIGHT: 133.06 LBS | TEMPERATURE: 99 F | DIASTOLIC BLOOD PRESSURE: 86 MMHG | BODY MASS INDEX: 21.38 KG/M2 | HEIGHT: 66 IN | SYSTOLIC BLOOD PRESSURE: 103 MMHG | HEART RATE: 70 BPM

## 2018-07-09 DIAGNOSIS — C7A.012 MALIGNANT CARCINOID TUMOR OF ILEUM: Primary | ICD-10-CM

## 2018-07-09 DIAGNOSIS — C7B.8 SECONDARY NEUROENDOCRINE TUMOR OF LIVER: ICD-10-CM

## 2018-07-09 DIAGNOSIS — C7B.8 SECONDARY NEUROENDOCRINE TUMOR OF DISTANT LYMPH NODES: ICD-10-CM

## 2018-07-09 PROCEDURE — 99214 OFFICE O/P EST MOD 30 MIN: CPT | Performed by: SURGERY

## 2018-07-09 RX ORDER — PROMETHAZINE HYDROCHLORIDE 25 MG/1
25 TABLET ORAL EVERY 6 HOURS PRN
COMMUNITY
End: 2018-07-11 | Stop reason: SDUPTHER

## 2018-07-09 RX ORDER — PREDNISONE 50 MG/1
TABLET ORAL
Qty: 3 TABLET | Refills: 0 | Status: SHIPPED | OUTPATIENT
Start: 2018-07-09 | End: 2018-07-24

## 2018-07-09 NOTE — PATIENT INSTRUCTIONS
CT, MRI and Gallium scan scheduled -- please call clinic ASAP if these need to be rescheduled  Allergy prep sent to pharmacy for CT scan   Follow up after scans

## 2018-07-09 NOTE — LETTER
July 9, 2018      Pallavi Sunkara, MD  [address]  [facility] [address]           [facility] [place]  [place] [address]  Phone: [phone]  Fax: [phone]          Patient: Patito Domingo   MR Number: [id number]   YOB: 1950   Date of Visit: 7/9/2018       Dear Dr. Pallavi Sunkara:    Thank you for referring Patito Domingo to me for evaluation. Attached you will find relevant portions of my assessment and plan of care.    If you have questions, please do not hesitate to call me. I look forward to following Patito Domingo along with you.    Sincerely,    Chris Herbert MD    Enclosure  CC:  No Recipients    If you would like to receive this communication electronically, please contact [email or web address] or [phone] to request more information on [employer] Link access.    For providers and/or their staff who would like to refer a patient to [facility], please contact us through our one-stop-shop provider referral line, [facility], at [phone].    If you feel you have received this communication in error or would no longer like to receive these types of communications, please e-mail [email or web address]

## 2018-07-09 NOTE — PROGRESS NOTES
"NOLANETS:  [facility]  A collaboration between [facility] and [facility]      PATIENT: Patito Domingo  MRN: [id number]  DATE: 7/9/2018    Subjective:      Chief Complaint: Follow-up (Follow Up Visit)  still having issues with diarrhea and abdominal pain    Work up not completed and pending for the last few months  She says she does ot have support to help her    Feeling tired and weak    Pain all over  Vitals:   Vitals:    07/09/18 1315   BP: 103/86   BP Location: Right arm   Pulse: 70   Temp: 99.4 °F (37.4 °C)   TempSrc: Oral   Weight: 60.3 kg (133 lb 0.8 oz)   Height: 5' 6" (1.676 m)        Karnofsky Score:     Diagnosis: No diagnosis found.     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)        Past Surgical History:  Past Surgical History:   Procedure Laterality Date    CATARACT EXTRACTION Left 10/2017    CHOLECYSTECTOMY      COLON SURGERY      cystoscope      EYE SURGERY      HYSTERECTOMY  5/1996    LITHOTRIPSY      LIVER BIOPSY  9/14    carcinoid       Family History:  Family History   Problem Relation Age of Onset    Cancer Mother         unknown    Alzheimer's disease Father     Stroke Sister     No Known Problems Son     No Known Problems Son     No Known Problems Son     No Known Problems Son        Allergies:  Review of patient's allergies indicates:   Allergen Reactions    Epinephrine Anaphylaxis     Can cause  a Carcinoid Crisis    Contrast media Hives, Itching and Swelling    Ibuprofen Hives, Itching and Swelling    Iodinated contrast- oral and iv dye     Sulfa (sulfonamide antibiotics) Hives, Itching and " Swelling       Medications:  Current Outpatient Prescriptions   Medication Sig Dispense Refill    cyanocobalamin 1,000 mcg/mL injection 1,000 mcg every 30 days.   0    diphenoxylate-atropine 2.5-0.025 mg (LOMOTIL) 2.5-0.025 mg per tablet TAKE 1 TABLET BY MOUTH 4 TIMES A DAY AS NEEDED FOR DIARRHEA 60 tablet 2    gabapentin (NEURONTIN) 100 MG capsule TAKE 2 CAPSULES TWICE A DAY (Patient taking differently: takes one capsule BID) 120 capsule 6    hydroCHLOROthiazide (MICROZIDE) 12.5 mg capsule Take 1 capsule (12.5 mg total) by mouth once daily. 30 capsule 2    lanreotide (SOMATULINE DEPOT) 120 mg/0.5 mL Syrg Inject 120 mg into the skin every 28 days.      losartan (COZAAR) 50 MG tablet TAKE 1 TABLET BY MOUTH DAILY 30 tablet 2    metoprolol tartrate (LOPRESSOR) 50 MG tablet Take 1 tablet (50 mg total) by mouth 2 (two) times daily. 180 tablet 0    nystatin (MYCOSTATIN) 100,000 unit/mL suspension SWISH AND SWALLOW 1 TEASPOON BY MOUTH 4 TIMES A DAY. ONCE RELIEVED CONTINUE FOR 48 HOURS      ondansetron (ZOFRAN) 4 MG tablet Take 1 tablet (4 mg total) by mouth every 6 (six) hours as needed for Nausea. 60 tablet 2    oxyCODONE (ROXICODONE) 30 MG Tab Take 30 mg by mouth every 4 (four) hours as needed.       promethazine (PHENERGAN) 25 MG tablet Take 25 mg by mouth every 6 (six) hours as needed for Nausea.      traZODone (DESYREL) 50 MG tablet TK 1 TO 2 TS PO  QHS PRF SLEEP  6    amLODIPine (NORVASC) 10 MG tablet Take 1 tablet (10 mg total) by mouth once daily. 30 tablet 11     No current facility-administered medications for this visit.        Review of Systems   Constitutional: Positive for activity change, appetite change, fatigue and unexpected weight change. Negative for chills, diaphoresis and fever.   HENT: Negative for dental problem, drooling, ear discharge, ear pain, facial swelling, hearing loss, nosebleeds, postnasal drip, rhinorrhea, sinus pain, sinus pressure, sneezing, sore throat, tinnitus, trouble  swallowing and voice change.    Eyes: Negative for photophobia, discharge, redness, itching and visual disturbance.   Respiratory: Negative for cough, choking, chest tightness, shortness of breath and stridor.    Cardiovascular: Negative for palpitations and leg swelling.   Gastrointestinal: Positive for abdominal distention, abdominal pain, diarrhea and nausea. Negative for blood in stool, constipation and rectal pain.   Genitourinary: Negative.    Musculoskeletal: Positive for arthralgias, joint swelling and myalgias.   Skin: Negative.    Allergic/Immunologic: Negative.    Neurological: Negative for tremors, seizures, syncope, facial asymmetry, speech difficulty and weakness.   Hematological: Negative.    Psychiatric/Behavioral: Negative.       Objective:      Physical Exam   Constitutional: She is oriented to person, place, and time. No distress.   HENT:   Head: Normocephalic and atraumatic.   Right Ear: External ear normal.   Left Ear: External ear normal.   Eyes: Conjunctivae and EOM are normal. Pupils are equal, round, and reactive to light.   Neck: Normal range of motion. Neck supple.   Cardiovascular: Normal rate and regular rhythm.    Pulmonary/Chest: Effort normal.   Abdominal: Soft. Bowel sounds are normal.   Musculoskeletal: Normal range of motion.   Neurological: She is alert and oriented to person, place, and time.   Skin: Skin is warm. She is not diaphoretic.   Psychiatric: She has a normal mood and affect.      Assessment:       No diagnosis found.    Laboratory Data:   Results for ALEXIS GORDON (MRN [id number]) as of 7/12/2018 08:56   Ref. Range 6/28/2018 13:55 6/28/2018 20:29 6/29/2018 13:48   Specimen UA Unknown   Urine, Clean Catch   Color, UA Latest Ref Range: Yellow, Straw, [place]    Yellow   pH, UA Latest Ref Range: 5.0 - 8.0    6.0   Specific Gravity, UA Latest Ref Range: 1.005 - 1.030    >=1.030 (A)   Appearance, UA Latest Ref Range: Clear    Hazy (A)   Protein, UA Latest Ref Range:  Negative    Trace (A)   Glucose, UA Latest Ref Range: Negative    Negative   Ketones, UA Latest Ref Range: Negative    1+ (A)   Occult Blood UA Latest Ref Range: Negative    Trace (A)   Nitrite, UA Latest Ref Range: Negative    Positive (A)   Urobilinogen, UA Latest Ref Range: <2.0 EU/dL   Negative   Bilirubin (UA) Latest Ref Range: Negative    Negative   Leukocytes, UA Latest Ref Range: Negative    Trace (A)   RBC, UA Latest Ref Range: 0 - 4 /hpf   10 (H)   WBC, UA Latest Ref Range: 0 - 5 /hpf   25 (H)   Bacteria, UA Latest Ref Range: None-Occ /hpf   Few (A)   Squam Epithel, UA Latest Units: /hpf   8   Ca Oxalate Imelda, UA Latest Ref Range: None-Moderate    Occasional   Microscopic Comment Unknown   SEE COMMENT   CT ABDOMEN PELVIS WITHOUT CONTRAST Unknown Rpt     XR CHEST 1 VIEW Unknown  Rpt    _____________________________________________________________________     PRESENTER:   SASHA Herbert MD     REASON FOR PRESENTATION:  Treatment Plan and Scan Review     ATTENDEES:   Surgery:              MD ENA Damico MD T. 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 Her CT from 5/2017 showed increasing number of liver lesions. No apparent extra hepatic disease. She had a cholecystectomy in December. Rec discussing after scans. She is a candidate for LDT (TACE vs Y90).        BOARD RECOMMENDATIONS:      Needs tumor markers now  Surgical evaluation after scans in February   Consider systemic therapy if she does not want surgery(afinitor)  Start on Lanreotde  Appointment with Dr Perkins  Consider afinitor   Consider LDT                        Impression: SB NEt  With liver mets  Was admitted in December for acute cholecystitis.  She had not completed workup at that time.  She isn't multiple visits to the ER for pain and diarrhea.  She was discussed the tumor board and the plan was to construct treatment depending upon her scans however she not compliant with the scans missed appointments for gallium scan.    The treatment plan is pending as she is not able to complete her required workup.    She continues to have symptoms of pain and diarrhea and is not on any treatment.  She is not getting lanreotide regularly too    At a long discussion and emphasized the importance of completing the scan and starting on treatment.  Completed the previously scheduled workup and then discussed the tumor board.   emphasized to complete the scans  Plan:       Complete workup  I discussed at the tumor board    Spent a lot of time addressing her needs and stressing the importance of the work                  AMY Perez MD, FACS   Associate Professor of Surgery, [facility]   Neuroendocrine Surgery, Hepatic/Pancreatic & General Surgery   [address], [address]   [place] [address]   ph. [phone]; [phone]   fax. [phone]

## 2018-07-09 NOTE — PROGRESS NOTES
SUMMARY  LCSW phoned patient. Patient stated that she has not received her mail because she has been at her son's house in [place]. She verified that she has her appointment and has transportation today. She is at her older son's home but will be returning to [place]. She stated that she had a close call to having a fall recently. She stated that she was not using her walker.She verbalized knowledge of information OPCM RN provided regarding safety, such as, removing rugs and not carrying items and walking slowly. Her home health nurse told her that she does not qualify for a new walker since she has had her current walker for only 3 years. Educated patient on difference between a walker and a rollator, informed her that there will be co-pay for a rollator. Encouraged her to address her falls and DME needs at her PCP appt this afternoon.   Patient address her concerns about being dependent on others to go shopping. She stated that she often becomes dizzy when standing.   Patient agreed to follow up in one week.     INTERVENTIONS  · Collaborated with OPCM - RN.   · Provided education regarding DME.  · Provided supportive counseling.     PLAN  Follow up in one week, 7/16/18, to verify receipt of transportation resources.

## 2018-07-10 ENCOUNTER — TELEPHONE (OUTPATIENT)
Dept: NEUROLOGY | Facility: HOSPITAL | Age: 68
End: 2018-07-10

## 2018-07-10 NOTE — TELEPHONE ENCOUNTER
----- Message from Katalina Gaytan sent at 7/10/2018 11:26 AM CDT -----  Contact: Patient  BRIAN- patient needs to speak to nurse about procedure. Call back number [phone]

## 2018-07-11 ENCOUNTER — TELEPHONE (OUTPATIENT)
Dept: NEUROLOGY | Facility: HOSPITAL | Age: 68
End: 2018-07-11

## 2018-07-11 RX ORDER — PROMETHAZINE HYDROCHLORIDE 25 MG/1
25 TABLET ORAL EVERY 6 HOURS PRN
Qty: 30 TABLET | Refills: 0 | Status: SHIPPED | OUTPATIENT
Start: 2018-07-11 | End: 2018-08-02 | Stop reason: SDUPTHER

## 2018-07-11 NOTE — TELEPHONE ENCOUNTER
Took call directly from patient and explained all  Of her scans again and where they were and the times.  She verbalized understanding after being told she needed the to get a new baseline of the internal organs and body.

## 2018-07-11 NOTE — TELEPHONE ENCOUNTER
Patient would like to have a refill on Phenergan she states the Zofran isn't working for her nausea.

## 2018-07-11 NOTE — TELEPHONE ENCOUNTER
----- Message from Alexis Hernandez sent at 7/11/2018  1:45 PM CDT -----  Contact: self/[phone]  Patient called to discuss getting another nausea medication than the one below as it is not working for her.    Please call and advise.    ondansetron disintegrating tablet 4 mg

## 2018-07-12 ENCOUNTER — OUTPATIENT CASE MANAGEMENT (OUTPATIENT)
Dept: ADMINISTRATIVE | Facility: OTHER | Age: 68
End: 2018-07-12

## 2018-07-12 NOTE — PROGRESS NOTES
7/12  1st Attempt to complete follow-up for Outpatient Care Management; left message requesting return call.

## 2018-07-12 NOTE — PROGRESS NOTES
7/12   Summary:  Contacted patient by phone for follow up visit today. Patient states she is taking all medications as directed. Patient states has recently had nausea and called her MD and they called in a prescription for Phenergan which has helped more than the Zofran in the past. Patient states her daughter-n-law will pick it up from pharmacy today. Patient states [facility] nurse is visiting twice weekly. Patient reports she almost fell once since our last visit when she was trying to carry something and walk with her walker.  Educated on fall prevention and home safety ie not carrying items when walking, no throw rugs, good lighting, clear pathways and patient voiced understanding/agreement. Patient will discuss walker order with her MD on next visit as LCSW told her she cannot get it since Medicare only pays for a walker every 3 years. Educated on s/s infection to watch for ie. fever, chills, cough. Patient states diarrhea has improved and continues to eat a bland diet. Reminded patient of upcoming appointments for CT scan and MRI of abd. on 7/19 then f/u with Dr. Magdalena Hernandez on 7/24 to discuss results. Encouraged patient to call this RN if having any questions/concerns. OPCM willl continue to follow.       Interventions:  -Educated patient on Home Safety and Fall prevention and Infection prevention for Immunocompromised patient. Encouraged patient to follow medication and treatment regimen. Encouraged patient to maintain follow up with doctors.       Plan:  -Continue to educate about Home Safety and Fall prevention and Infection prevention for Immunocompromised patient.  Encourage patient to follow medication and treatment regimen. Encourage patient to maintain follow up with doctors.

## 2018-07-16 ENCOUNTER — OUTPATIENT CASE MANAGEMENT (OUTPATIENT)
Dept: ADMINISTRATIVE | Facility: OTHER | Age: 68
End: 2018-07-16

## 2018-07-18 ENCOUNTER — OUTPATIENT CASE MANAGEMENT (OUTPATIENT)
Dept: ADMINISTRATIVE | Facility: OTHER | Age: 68
End: 2018-07-18

## 2018-07-18 NOTE — PROGRESS NOTES
SUMMARY  LCSW phoned patient. Patient stated that she is having difficulties with fluid retention in her legs. She is back at her son's home in [place], [place]. Both sons have been on vacation and returned. Patient expressed concern about not getting her Medicaid card in the mail. She did not receive the Medicaid number which LCSW had included on previous mailing. Reviewed benefit of Medicaid paying for medications and medical transportation. Discussed Medicaid waiver services. Patient stated that she continues to need assistance with food. She said that she lives with her son but she tries to pay for her own food. She was denied Food [place] due to family income. Discussed other resources for food, such as commodities and Food [place].   Patient confirmed that she has a PCP appointment on 7/24/18 and plans to request referral for a rollator.   Patient requested that resources be mailed to her home address, [address]o [address], [address] [place] [id number]. Patient agreed to follow up next week.     INTERVENTIONS  · Provided resources for medications, transportation and Waiver services through Medicaid. Mailed resources with patient's Medicaid number.   · Provided supportive counseling.     PLAN  Follow up in one week, 7/25/18, to follow up on resources.

## 2018-07-18 NOTE — LETTER
July 18, 2018    Patito Domingo  [address]  [facility] [address]   [address]  [address] [place] [address]               [facility] [address] [place] [address] Dear Ms. Domingo:      I have enclosed Medicaid transportation, Commodities, and Medicaid waiver service, as discussed via telephone today. We show your Medicaid number as [id number]. You can present this number to your pharmacy to fill your prescriptions. You will need to contact Medicaid to verify that your card has been mailed to you.  I hope this will be helpful.    If any additional needs arise, please contact me at [phone].      Sincerely,        Chloé Lion LCSW

## 2018-07-24 ENCOUNTER — TELEPHONE (OUTPATIENT)
Dept: NEUROLOGY | Facility: HOSPITAL | Age: 68
End: 2018-07-24

## 2018-07-24 ENCOUNTER — OFFICE VISIT (OUTPATIENT)
Dept: FAMILY MEDICINE | Facility: CLINIC | Age: 68
End: 2018-07-24
Payer: MEDICARE

## 2018-07-24 ENCOUNTER — TELEPHONE (OUTPATIENT)
Dept: INFUSION THERAPY | Facility: HOSPITAL | Age: 68
End: 2018-07-24

## 2018-07-24 VITALS
SYSTOLIC BLOOD PRESSURE: 162 MMHG | HEART RATE: 66 BPM | RESPIRATION RATE: 18 BRPM | BODY MASS INDEX: 25.55 KG/M2 | WEIGHT: 159 LBS | OXYGEN SATURATION: 99 % | HEIGHT: 66 IN | TEMPERATURE: 98 F | DIASTOLIC BLOOD PRESSURE: 72 MMHG

## 2018-07-24 DIAGNOSIS — R31.9 HEMATURIA, UNSPECIFIED TYPE: ICD-10-CM

## 2018-07-24 DIAGNOSIS — R60.0 LOWER EXTREMITY EDEMA: ICD-10-CM

## 2018-07-24 DIAGNOSIS — M54.5 CHRONIC MIDLINE LOW BACK PAIN, WITH SCIATICA PRESENCE UNSPECIFIED: ICD-10-CM

## 2018-07-24 DIAGNOSIS — N20.0 KIDNEY STONE: ICD-10-CM

## 2018-07-24 DIAGNOSIS — I10 ESSENTIAL HYPERTENSION: Primary | ICD-10-CM

## 2018-07-24 DIAGNOSIS — G89.29 CHRONIC MIDLINE LOW BACK PAIN, WITH SCIATICA PRESENCE UNSPECIFIED: ICD-10-CM

## 2018-07-24 DIAGNOSIS — D50.9 IRON DEFICIENCY ANEMIA, UNSPECIFIED IRON DEFICIENCY ANEMIA TYPE: ICD-10-CM

## 2018-07-24 DIAGNOSIS — N17.9 AKI (ACUTE KIDNEY INJURY): ICD-10-CM

## 2018-07-24 LAB
BILIRUB SERPL-MCNC: ABNORMAL MG/DL
BLOOD, POC UA: 50
GLUCOSE UR QL STRIP: 50
KETONES UR QL STRIP: NEGATIVE
LEUKOCYTE ESTERASE URINE, POC: ABNORMAL
NITRITE, POC UA: NEGATIVE
PH, POC UA: 5
PROTEIN, POC: ABNORMAL
SPECIFIC GRAVITY, POC UA: 1.02
UROBILINOGEN, POC UA: NEGATIVE

## 2018-07-24 PROCEDURE — 99999 PR PBB SHADOW E&M-EST. PATIENT-LVL V: CPT | Mod: PBBFAC,,, | Performed by: FAMILY MEDICINE

## 2018-07-24 PROCEDURE — 87086 URINE CULTURE/COLONY COUNT: CPT

## 2018-07-24 PROCEDURE — 99215 OFFICE O/P EST HI 40 MIN: CPT | Mod: PBBFAC,PN | Performed by: FAMILY MEDICINE

## 2018-07-24 PROCEDURE — 81000 URINALYSIS NONAUTO W/SCOPE: CPT | Mod: PBBFAC,PN | Performed by: FAMILY MEDICINE

## 2018-07-24 PROCEDURE — 99215 OFFICE O/P EST HI 40 MIN: CPT | Mod: S$PBB,,, | Performed by: FAMILY MEDICINE

## 2018-07-24 RX ORDER — TAMSULOSIN HYDROCHLORIDE 0.4 MG/1
0.4 CAPSULE ORAL DAILY
Qty: 30 CAPSULE | Refills: 0 | Status: SHIPPED | OUTPATIENT
Start: 2018-07-24 | End: 2018-09-19 | Stop reason: SDUPTHER

## 2018-07-24 RX ORDER — BUMETANIDE 0.5 MG/1
0.5 TABLET ORAL DAILY
Qty: 30 TABLET | Refills: 2 | Status: SHIPPED | OUTPATIENT
Start: 2018-07-24 | End: 2018-09-19 | Stop reason: SDUPTHER

## 2018-07-24 NOTE — TELEPHONE ENCOUNTER
----- Message from Angela Cuba sent at 7/24/2018  2:34 PM CDT -----  Contact: Patient  BRIAN- Patient has kidney stones and is unsure if she can make the Gallium Scan on 8/1/18. Patient is in a great deal of pain. Please call patient to discuss options at [phone].

## 2018-07-24 NOTE — PROGRESS NOTES
HPI:  Patito Doimngo is a 67 y.o. year old female that  presents with complaint of lower extremity swelling since been taken off her hydrochlorothiazide.  She also notes that she was having pressure with urination and back pain. Wants to be evaluated for urinary tract infection.  Although her blood pressure is elevated today it at home it has not been elevated since coming off of the blood pressure medicine which was removed secondary to her blood pressure being low by the home health nurse.  Chief Complaint   Patient presents with    Follow-up    Edema     in feet, pt has stop taking HCTZ because of blood pressure issues   .     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)      Social History     Social History    Marital status:      Spouse name: N/A    Number of children: N/A    Years of education: N/A     Occupational History    disabled       Social History Main Topics    Smoking status: Never Smoker    Smokeless tobacco: Never Used    Alcohol use No    Drug use: No    Sexual activity: Not Currently     Other Topics Concern    Not on file     Social History Narrative    No narrative on file     Past Surgical History:   Procedure Laterality Date    CATARACT EXTRACTION Left 10/2017    CHOLECYSTECTOMY      COLON SURGERY      cystoscope      EYE SURGERY      HYSTERECTOMY  5/1996    LITHOTRIPSY      LIVER BIOPSY  9/14    carcinoid     Family History   Problem Relation Age of Onset    Cancer Mother         unknown    Alzheimer's disease Father     Stroke Sister     No Known Problems Son     No Known Problems Son     No Known Problems Son     No Known Problems Son            Review of Systems  General ROS: negative for chills, fever or  "weight loss  ENT ROS: negative for epistaxis, sore throat or rhinorrhea  Respiratory ROS: no cough, shortness of breath, or wheezing  Cardiovascular ROS: no chest pain or dyspnea on exertion  Gastrointestinal ROS: no abdominal pain, change in bowel habits, or black/ bloody stools  Extremities ROS:  Bilateral lower extremity edema    Physical Exam:  BP (!) 162/72   Pulse 66   Temp 98.3 °F (36.8 °C) (Oral)   Resp 18   Ht 5' 6" (1.676 m)   Wt 72.1 kg (159 lb)   SpO2 99%   BMI 25.66 kg/m²   General appearance: alert, cooperative, no distress  Constitutional:Oriented to person, place, and time.appears well-developed and well-nourished.  HEENT: Normocephalic, atraumatic, neck symmetrical, no nasal discharge, TM- clear bilaterally  Lungs: clear to auscultation bilaterally, no dullness to percussion bilaterally  Heart: regular rate and rhythm without rub; no displacement of the PMI , S1&S2 present  Abdomen: soft, non-tender; bowel sounds normoactive; no organomegaly  Back:  Positive right CVA tenderness  Extremity:  Bilateral lower extremity edema +2  Physical Exam    LABS:    Complete Blood Count  Lab Results   Component Value Date    RBC 3.73 (L) 06/28/2018    HGB 10.3 (L) 06/28/2018    HCT 32.8 (L) 06/28/2018    MCV 88 06/28/2018    MCH 27.6 06/28/2018    MCHC 31.4 (L) 06/28/2018    RDW 14.5 06/28/2018     06/28/2018    MPV 10.9 06/28/2018    GRAN 2.5 06/28/2018    GRAN 62.3 06/28/2018    LYMPH 1.0 06/28/2018    LYMPH 25.4 06/28/2018    MONO 0.4 06/28/2018    MONO 10.2 06/28/2018    EOS 0.1 06/28/2018    BASO 0.01 06/28/2018    EOSINOPHIL 1.8 06/28/2018    BASOPHIL 0.3 06/28/2018    DIFFMETHOD Automated 06/28/2018       Comprehensive Metabolic Panel  Lab Results   Component Value Date    GLU 93 06/28/2018    BUN 18 (H) 06/28/2018    CREATININE 0.76 06/28/2018     06/28/2018    K 4.7 06/28/2018     06/28/2018    PROT 8.1 06/28/2018    ALBUMIN 4.4 06/28/2018    BILITOT 1.0 06/28/2018    AST 31 " 06/28/2018    ALKPHOS 106 06/28/2018    CO2 24 06/28/2018    ALT 12 06/28/2018    ANIONGAP 12 06/28/2018    EGFRNONAA >60.0 06/28/2018    ESTGFRAFRICA >60.0 06/28/2018       LIPID  Lab Results   Component Value Date    CHOL 100 (L) 04/28/2018    HDL 64 04/28/2018         TSH  Lab Results   Component Value Date    TSH 0.896 03/14/2016       Current Outpatient Prescriptions   Medication Sig Dispense Refill    amLODIPine (NORVASC) 10 MG tablet Take 1 tablet (10 mg total) by mouth once daily. 30 tablet 11    cyanocobalamin 1,000 mcg/mL injection 1,000 mcg every 30 days.   0    diphenoxylate-atropine 2.5-0.025 mg (LOMOTIL) 2.5-0.025 mg per tablet TAKE 1 TABLET BY MOUTH 4 TIMES A DAY AS NEEDED FOR DIARRHEA 60 tablet 2    gabapentin (NEURONTIN) 100 MG capsule TAKE 2 CAPSULES TWICE A DAY (Patient taking differently: takes one capsule BID) 120 capsule 6    losartan (COZAAR) 50 MG tablet TAKE 1 TABLET BY MOUTH DAILY 30 tablet 2    metoprolol tartrate (LOPRESSOR) 50 MG tablet Take 1 tablet (50 mg total) by mouth 2 (two) times daily. 180 tablet 0    oxyCODONE (ROXICODONE) 30 MG Tab Take 30 mg by mouth every 4 (four) hours as needed.       promethazine (PHENERGAN) 25 MG tablet Take 1 tablet (25 mg total) by mouth every 6 (six) hours as needed for Nausea. 30 tablet 0    bumetanide (BUMEX) 0.5 MG Tab Take 1 tablet (0.5 mg total) by mouth once daily. 30 tablet 2    lanreotide (SOMATULINE DEPOT) 120 mg/0.5 mL Syrg Inject 120 mg into the skin every 28 days.      nystatin (MYCOSTATIN) 100,000 unit/mL suspension SWISH AND SWALLOW 1 TEASPOON BY MOUTH 4 TIMES A DAY. ONCE RELIEVED CONTINUE FOR 48 HOURS      tamsulosin (FLOMAX) 0.4 mg Cap Take 1 capsule (0.4 mg total) by mouth once daily. 30 capsule 0    walker (ULTRA-LIGHT ROLLATOR) Misc 1 Units by Misc.(Non-Drug; Combo Route) route once daily. 1 each 0     No current facility-administered medications for this visit.        Assessment:    ICD-10-CM ICD-9-CM    1. Essential  hypertension I10 401.9    2. Lower extremity edema R60.0 782.3 walker (ULTRA-LIGHT ROLLATOR) Misc      bumetanide (BUMEX) 0.5 MG Tab   3. Hematuria, unspecified type R31.9 599.70 POCT Urinalysis      Ambulatory Referral to Nephrology      Urine culture   4. Chronic midline low back pain, with sciatica presence unspecified M54.5 724.2 walker (ULTRA-LIGHT ROLLATOR) Misc    G89.29 338.29    5. FLORECITA (acute kidney injury) N17.9 584.9    6. Iron deficiency anemia, unspecified iron deficiency anemia type D50.9 280.9    7. Kidney stone N20.0 592.0 Ambulatory Referral to Nephrology      tamsulosin (FLOMAX) 0.4 mg Cap         Plan:  Elevated blood pressure today may be secondary to pain.  Will add Bumex on to control lower extremity swelling.  Will refer to Nephrology for evaluation and treatment of renal stones as seen on CT scan as well as positive hematuria and lower back pain.  Follow-up in 1 month (on 8/24/2018).          Magdalena Hernandez MD

## 2018-07-24 NOTE — TELEPHONE ENCOUNTER
Received call back from pt. Pt has kidney stones. Saw PCP today. Needing to reschedule GA68. Advised that Gallium scan is not available until October. Pt verbalizes understanding.

## 2018-07-25 ENCOUNTER — OUTPATIENT CASE MANAGEMENT (OUTPATIENT)
Dept: ADMINISTRATIVE | Facility: OTHER | Age: 68
End: 2018-07-25

## 2018-07-25 LAB
BACTERIA UR CULT: NORMAL
BACTERIA UR CULT: NORMAL

## 2018-07-26 ENCOUNTER — TELEPHONE (OUTPATIENT)
Dept: ADMINISTRATIVE | Facility: CLINIC | Age: 68
End: 2018-07-26

## 2018-07-26 NOTE — PROGRESS NOTES
Home Health SOC with [facility]-Dr. Magdalena Hernandez. Pt received SN, PT and OT services.

## 2018-07-27 ENCOUNTER — TELEPHONE (OUTPATIENT)
Dept: FAMILY MEDICINE | Facility: CLINIC | Age: 68
End: 2018-07-27

## 2018-07-27 ENCOUNTER — OUTPATIENT CASE MANAGEMENT (OUTPATIENT)
Dept: ADMINISTRATIVE | Facility: OTHER | Age: 68
End: 2018-07-27

## 2018-07-27 RX ORDER — DIPHENOXYLATE HYDROCHLORIDE AND ATROPINE SULFATE 2.5; .025 MG/1; MG/1
TABLET ORAL
Qty: 60 TABLET | Refills: 2 | Status: SHIPPED | OUTPATIENT
Start: 2018-07-27 | End: 2018-09-19 | Stop reason: SDUPTHER

## 2018-07-27 NOTE — TELEPHONE ENCOUNTER
----- Message from Yris Martins sent at 7/27/2018  7:53 AM CDT -----  Contact: [phone]/ self   Pt called stating she is is in severe pain from kidney stones. Please advise

## 2018-07-27 NOTE — TELEPHONE ENCOUNTER
----- Message from Simran Alicea sent at 7/27/2018  3:31 PM CDT -----  Contact: [phone]/self  Patient requesting a refill for diphenoxylate-atropine 2.5-0.025 mg (LOMOTIL) 2.5-0.025 mg per tablet. [facility]. Please advise.

## 2018-07-27 NOTE — TELEPHONE ENCOUNTER
Patient states she is in excruciating pain from kidney stones. Most of the pain is on her left side, she is also experiencing nausea. Recommended that patient go to her nearest ED. Patient states she will have her daughter in law bring her.

## 2018-07-27 NOTE — PROGRESS NOTES
SUMMARY  Received voice mail from patient. Returned call. Patient was crying, stated that she was having pain, constantly going to the bathroom, had burning in urination. She had called her doctor's office and her home health nurse. Encouraged her to go to Urgent Care or the Emergency Room. She stated that her daughter in law was there and could take her. Asked her to call back after she went to the doctor. She agreed to go and to call back.     INTERVENTION  Crisis counseling.    PLAN  Follow up to ensure resolution of situation.  1425 Reviewed chart. Patient seen in ED today.   Follow up in one week.

## 2018-07-30 ENCOUNTER — OUTPATIENT CASE MANAGEMENT (OUTPATIENT)
Dept: ADMINISTRATIVE | Facility: OTHER | Age: 68
End: 2018-07-30

## 2018-07-30 NOTE — PROGRESS NOTES
7/30   Summary:  Contacted patient by phone for follow up visit today. Patient states she is taking all medications as directed. Patient states she recently had ER visits for R hip pain. Patient states they did not find anything wrong but she states she thinks it is r/t her kidney stones. Patient states she has seen a Urologist  (Dr. Stan Marques) in the past and plans to call and make a f/u appointment with him. OPCM provided phone number to patient for Dr. Marques to the [facility]. Patient states she has not had any fever since her ER visit. Educated on s/s infection to watch for ie. fever, chills, cough, burning/frequency with urination.  Patient states she is still having pain in her right side/hip. Patient has pain medications and sees her Pain Management MD on this Wednesday. Patient denies falls since our las visit. Educated on fall prevention and home safety ie not carrying items when walking, no throw rugs, good lighting, clear pathways and patient voiced understanding/agreement. Reminded patient of upcoming appointments with Dr. Hernandez on 8/20 and Dr. Herbert on 8/23 and patient is aware. Encouraged patient to call this RN if having any questions/concerns. OPCM willl continue to follow.       Interventions:  -Educated patient on Home Safety and Fall prevention and Infection prevention for Immunocompromised patient. Encouraged patient to follow medication and treatment regimen. Encouraged patient to maintain follow up with doctors.        Plan:  -Continue to educate about Home Safety and Fall prevention and Infection prevention for Immunocompromised patient.  Encourage patient to follow medication and treatment regimen. Encourage patient to maintain follow up with doctors.

## 2018-08-01 ENCOUNTER — TELEPHONE (OUTPATIENT)
Dept: FAMILY MEDICINE | Facility: CLINIC | Age: 68
End: 2018-08-01

## 2018-08-01 NOTE — TELEPHONE ENCOUNTER
----- Message from Leona Wells sent at 8/1/2018  3:44 PM CDT -----  Contact: Self/ [phone]  Patient called in to ask if you were to get her a referral to see a kidney specialist.    Please call and advise.

## 2018-08-02 RX ORDER — PROMETHAZINE HYDROCHLORIDE 25 MG/1
25 TABLET ORAL EVERY 6 HOURS PRN
Qty: 30 TABLET | Refills: 0 | Status: SHIPPED | OUTPATIENT
Start: 2018-08-02 | End: 2018-08-20 | Stop reason: SDUPTHER

## 2018-08-03 ENCOUNTER — OUTPATIENT CASE MANAGEMENT (OUTPATIENT)
Dept: ADMINISTRATIVE | Facility: OTHER | Age: 68
End: 2018-08-03

## 2018-08-03 NOTE — PROGRESS NOTES
SUMMARY  LCSW phoned patient for follow up. Patient not feeling well, citing a headache. She stated that her daughter in law had to go to work and she is going to her other son's home today. Patient verified that she had received her mail from the  and has the Medicaid number. Patient expressed concern that she has been unable to get an appointment with her urologist, Dr. Stan Marques. She expressed concern that he may have retired. [facility] with [facility] an appointment with a urologist.     INTERVENTION  Scheduled appointment with Dr. Marques, 8/6/18, 3 pm. Phoned patient with the appointment time.     PLAN  Follow up in one week.

## 2018-08-09 ENCOUNTER — TELEPHONE (OUTPATIENT)
Dept: FAMILY MEDICINE | Facility: CLINIC | Age: 68
End: 2018-08-09

## 2018-08-09 NOTE — TELEPHONE ENCOUNTER
----- Message from Simran Alicea sent at 8/9/2018 12:19 PM CDT -----  Contact: [phone]/self  Patient would like to be seen sooner than the next available appointment for kidney stones. Please advise.

## 2018-08-10 ENCOUNTER — OUTPATIENT CASE MANAGEMENT (OUTPATIENT)
Dept: ADMINISTRATIVE | Facility: OTHER | Age: 68
End: 2018-08-10

## 2018-08-10 NOTE — PROGRESS NOTES
SUMMARY  1st Attempt to complete SW follow-up for Outpatient Care Management; left message requesting return call.  LCSW will reattempt at a later date.      INTERVENTION  NONE    PLAN  Follow up next week.

## 2018-08-14 ENCOUNTER — OUTPATIENT CASE MANAGEMENT (OUTPATIENT)
Dept: ADMINISTRATIVE | Facility: OTHER | Age: 68
End: 2018-08-14

## 2018-08-14 ENCOUNTER — OFFICE VISIT (OUTPATIENT)
Dept: FAMILY MEDICINE | Facility: CLINIC | Age: 68
End: 2018-08-14
Payer: MEDICARE

## 2018-08-14 VITALS
TEMPERATURE: 98 F | HEIGHT: 66 IN | OXYGEN SATURATION: 97 % | BODY MASS INDEX: 24.72 KG/M2 | DIASTOLIC BLOOD PRESSURE: 80 MMHG | SYSTOLIC BLOOD PRESSURE: 140 MMHG | HEART RATE: 68 BPM | WEIGHT: 153.81 LBS

## 2018-08-14 DIAGNOSIS — R60.0 LOWER EXTREMITY EDEMA: ICD-10-CM

## 2018-08-14 DIAGNOSIS — I10 ESSENTIAL HYPERTENSION: ICD-10-CM

## 2018-08-14 DIAGNOSIS — E83.42 HYPOMAGNESEMIA: ICD-10-CM

## 2018-08-14 DIAGNOSIS — D50.8 OTHER IRON DEFICIENCY ANEMIA: ICD-10-CM

## 2018-08-14 DIAGNOSIS — N20.0 KIDNEY STONE: Primary | ICD-10-CM

## 2018-08-14 DIAGNOSIS — E87.6 HYPOKALEMIA: ICD-10-CM

## 2018-08-14 PROBLEM — R19.7 DIARRHEA: Status: RESOLVED | Noted: 2018-04-19 | Resolved: 2018-08-14

## 2018-08-14 PROBLEM — K52.9 CHRONIC DIARRHEA: Status: RESOLVED | Noted: 2018-04-28 | Resolved: 2018-08-14

## 2018-08-14 PROBLEM — D50.9 IRON DEFICIENCY ANEMIA: Status: ACTIVE | Noted: 2018-08-14

## 2018-08-14 PROBLEM — M54.50 LOW BACK PAIN: Status: RESOLVED | Noted: 2018-02-20 | Resolved: 2018-08-14

## 2018-08-14 PROBLEM — N39.0 UTI (URINARY TRACT INFECTION): Status: RESOLVED | Noted: 2018-04-28 | Resolved: 2018-08-14

## 2018-08-14 PROBLEM — R10.9 INTRACTABLE ABDOMINAL PAIN: Status: RESOLVED | Noted: 2018-02-18 | Resolved: 2018-08-14

## 2018-08-14 PROCEDURE — 99999 PR PBB SHADOW E&M-EST. PATIENT-LVL V: CPT | Mod: PBBFAC,,, | Performed by: FAMILY MEDICINE

## 2018-08-14 PROCEDURE — 99215 OFFICE O/P EST HI 40 MIN: CPT | Mod: PBBFAC,PN | Performed by: FAMILY MEDICINE

## 2018-08-14 PROCEDURE — 99214 OFFICE O/P EST MOD 30 MIN: CPT | Mod: S$PBB,,, | Performed by: FAMILY MEDICINE

## 2018-08-14 NOTE — PROGRESS NOTES
"SUMMARY  Phoned patient. Confirmed that patient has an appointment today with PCP. She stated that she has had difficulty getting transportation to her "kidney" doctors due to the distance. She has changed her appointment but is still concerned about getting to the appointment. Encouraged patient to ask her PCP today for a referral to a doctor who is closer. If unavailable, LCSW will assist her in getting approval for Medicaid transportation to take her to her appointment.   Patient stated that she is now living with her youngest son all of the time due to a conflict with her older son's daughter in law. She expressed that she would like to have her own apartment but acknowledged that she needs assistance at this time.   Patient stated that she is not receiving home health visits at this time. She stated that she misses having someone check on her vital signs and feels that she could still benefit from SN.   Patient agreed to call back to relate MD recommendations for follow up or to return phone call from  in one week.     INTERVENTION  Phoned [facility], [phone]. Confirmed that patient is an active home health patient. They have had a couple of missed visits. Confirmed that they have her correct current address.   Assisted with transportation issues. Follow up with need for transfer to local MD if needed.     PLAN  Follow up in one week to confirm transportation to 8/23/18 appointment.   "

## 2018-08-14 NOTE — PROGRESS NOTES
FAMILY MEDICINE    Patient Active Problem List   Diagnosis    Neuroendocrine carcinoma, unknown primary site    Secondary neuroendocrine tumor of liver    Kidney stone    Carcinoid syndrome    HTN (hypertension)    Multiple thyroid nodules    Secondary neuroendocrine tumor of distant lymph nodes    Functional weakness    Hypokalemia    Hypomagnesemia    Increased frequency of urination    Iron deficiency anemia       CC:   Chief Complaint   Patient presents with    Flank Pain       SUBJECTIVE:  Patito Domingo   is a 68 y.o. female  - with h/o renal stone and previously followed by  Dr. Marques. Reports that had bilateral flank pain worsening and was seen in ER and has kidney stones and would like to see Dr. Marques again. Pain appears to have resolved and reviewed of noted shows diagnosis of low back with with sciatica and pt was given opioid pain meds and muscle relaxants and suggested to see Pain Management. Pain improved. She did not see any stones pass. No blood in her urine. No frequency, urgency or pain with urination.  - pt also concerned that she is developing a cold. +runny nose, congestion, and sore throat. No cough. No fevers. Started about 2 days ago. Tolerating PO. No N/V/D. No sick contacts. No SOB or chest pain. Worst at the day goes on. No difficulty swallowing        ROS: Review of Systems   Constitutional: Negative for activity change, appetite change, fatigue and fever.   HENT: Positive for congestion, postnasal drip, rhinorrhea and sore throat. Negative for mouth sores, nosebleeds, sinus pressure, sinus pain, tinnitus, trouble swallowing and voice change.    Eyes: Negative for photophobia, pain, redness and visual disturbance.   Respiratory: Negative for cough, chest tightness, shortness of breath and wheezing.    Cardiovascular: Negative for chest pain, palpitations and leg swelling.   Gastrointestinal: Negative for abdominal distention, abdominal pain, constipation, diarrhea, nausea  and vomiting.   Genitourinary: Positive for flank pain. Negative for decreased urine volume, difficulty urinating, dysuria, frequency, pelvic pain and urgency.   Musculoskeletal: Positive for arthralgias, back pain (resolved), neck pain and neck stiffness. Negative for myalgias.   Neurological: Negative for dizziness, light-headedness, numbness and headaches.       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)        Past Surgical History:   Procedure Laterality Date    CATARACT EXTRACTION Left 10/2017    CHOLECYSTECTOMY      COLON SURGERY      cystoscope      EYE SURGERY      HYSTERECTOMY  5/1996    LITHOTRIPSY      LIVER BIOPSY  9/14    carcinoid       ALLERGIES:   Review of patient's allergies indicates:   Allergen Reactions    Epinephrine Anaphylaxis     Can cause  a Carcinoid Crisis    Contrast media Hives, Itching and Swelling    Ibuprofen Hives, Itching and Swelling    Iodinated contrast- oral and iv dye     Sulfa (sulfonamide antibiotics) Hives, Itching and Swelling       MEDS:   Current Outpatient Medications:     amLODIPine (NORVASC) 10 MG tablet, Take 1 tablet (10 mg total) by mouth once daily., Disp: 30 tablet, Rfl: 11    bumetanide (BUMEX) 0.5 MG Tab, Take 1 tablet (0.5 mg total) by mouth once daily., Disp: 30 tablet, Rfl: 2    cyanocobalamin 1,000 mcg/mL injection, 1,000 mcg every 30 days. , Disp: , Rfl: 0    diphenoxylate-atropine 2.5-0.025 mg (LOMOTIL) 2.5-0.025 mg per tablet, TAKE 1 TABLET BY MOUTH 4 TIMES A DAY AS NEEDED FOR DIARRHEA, Disp: 60 tablet, Rfl: 2    gabapentin (NEURONTIN) 100 MG capsule, TAKE 2 CAPSULES TWICE A DAY (Patient taking differently: takes one capsule BID), Disp: 120 capsule, Rfl: 6    ketorolac (TORADOL) 10 mg tablet, Take 10 mg by mouth every 6  "(six) hours as needed for Pain., Disp: , Rfl:     lanreotide (SOMATULINE DEPOT) 120 mg/0.5 mL Syrg, Inject 120 mg into the skin every 28 days., Disp: , Rfl:     losartan (COZAAR) 50 MG tablet, TAKE 1 TABLET BY MOUTH DAILY, Disp: 30 tablet, Rfl: 2    metoprolol tartrate (LOPRESSOR) 50 MG tablet, Take 1 tablet (50 mg total) by mouth 2 (two) times daily., Disp: 180 tablet, Rfl: 0    oxyCODONE (ROXICODONE) 30 MG Tab, Take 30 mg by mouth every 4 (four) hours as needed. , Disp: , Rfl:     promethazine (PHENERGAN) 25 MG tablet, TAKE 1 TABLET (25 MG TOTAL) BY MOUTH EVERY 6 (SIX) HOURS AS NEEDED FOR NAUSEA., Disp: 30 tablet, Rfl: 0    tamsulosin (FLOMAX) 0.4 mg Cap, Take 1 capsule (0.4 mg total) by mouth once daily., Disp: 30 capsule, Rfl: 0    nystatin (MYCOSTATIN) 100,000 unit/mL suspension, SWISH AND SWALLOW 1 TEASPOON BY MOUTH 4 TIMES A DAY. ONCE RELIEVED CONTINUE FOR 48 HOURS, Disp: , Rfl:     walker (ULTRA-LIGHT ROLLATOR) Misc, 1 Units by Misc.(Non-Drug; Combo Route) route once daily., Disp: 1 each, Rfl: 0    OBJECTIVE:   Vitals:    08/14/18 1129   BP: (!) 140/80   BP Location: Left arm   Patient Position: Sitting   BP Method: Medium (Manual)   Pulse: 68   Temp: 98.2 °F (36.8 °C)   TempSrc: Oral   SpO2: 97%   Weight: 69.8 kg (153 lb 12.8 oz)   Height: 5' 6" (1.676 m)       Physical Exam   Constitutional: No distress.   HENT:   Head: Normocephalic and atraumatic.   Right Ear: Tympanic membrane and ear canal normal.   Left Ear: Tympanic membrane and ear canal normal.   Nose: Mucosal edema and rhinorrhea present. Right sinus exhibits no maxillary sinus tenderness and no frontal sinus tenderness. Left sinus exhibits no maxillary sinus tenderness and no frontal sinus tenderness.   Mouth/Throat: Uvula is midline and mucous membranes are normal. Posterior oropharyngeal erythema present. No oropharyngeal exudate or posterior oropharyngeal edema.   Neck: Neck supple. No thyromegaly present.   Cardiovascular: Normal " rate, regular rhythm, normal heart sounds and intact distal pulses.   Pulmonary/Chest: Effort normal and breath sounds normal. She has no wheezes. She has no rales.   Abdominal: Soft. Bowel sounds are normal. There is no tenderness.   No CVA tenderness   Musculoskeletal: She exhibits edema (trace bilateral).   Lymphadenopathy:     She has no cervical adenopathy.   Neurological: She is alert.   Skin: Skin is warm.         PERTINENT RESULTS:   7/27/2018   CT Renal Stone Study ABD Pelvis WO   Impression  Multiple bilateral renal calculi.  However, no ureteral calculi are identified and there is no evidence for hydronephrosis.  Findings are stable compared to 07/19/2018.  Subtle scattered hepatic hypodensities better evaluated on the recent contrast enhanced CT examination of the abdomen and pelvis.  Partially calcified mesenteric mass within the right mid abdomen, stable.  Right renal parenchymal scarring.  Surgical changes.    Reviewed ER notes    ASSESSMENT:  Problem List Items Addressed This Visit     Kidney stone - Primary    Current Assessment & Plan     - multiple stones present on CT though were noted before  - referral to Urology Dr. Marques         Relevant Orders    Ambulatory Referral to Urology    Hypokalemia    Current Assessment & Plan     - K 3.1 in ER  - rec repeat         Relevant Orders    Basic metabolic panel (Completed)    Hypomagnesemia    Current Assessment & Plan     - rec repeat         Relevant Orders    Magnesium (Completed)    Iron deficiency anemia    Current Assessment & Plan     - chronic stable  - check iron studies         Relevant Orders    Iron and TIBC    Ferritin          PLAN:   Orders Placed This Encounter    Basic metabolic panel    Magnesium    Iron and TIBC    Ferritin    Ambulatory Referral to Urology       Follow-up with Dr. Lincoln FERNANDES in 1 week previously scheduled.     Dr. Miriam Heath D.O.   Family Medicine

## 2018-08-15 RX ORDER — HYDROCHLOROTHIAZIDE 12.5 MG/1
12.5 CAPSULE ORAL DAILY
Qty: 30 CAPSULE | Refills: 2 | Status: SHIPPED | OUTPATIENT
Start: 2018-08-15 | End: 2018-09-19

## 2018-08-15 NOTE — TELEPHONE ENCOUNTER
----- Message from Stephanie Diehl sent at 8/15/2018  2:38 PM CDT -----  Contact: [phone]/self  Pt states she's returning your call concerning her test results  Please call and advise

## 2018-08-16 PROBLEM — E87.6 HYPOKALEMIA: Status: RESOLVED | Noted: 2018-04-28 | Resolved: 2018-08-16

## 2018-08-20 ENCOUNTER — OFFICE VISIT (OUTPATIENT)
Dept: FAMILY MEDICINE | Facility: CLINIC | Age: 68
End: 2018-08-20
Payer: MEDICARE

## 2018-08-20 ENCOUNTER — TELEPHONE (OUTPATIENT)
Dept: NEUROLOGY | Facility: HOSPITAL | Age: 68
End: 2018-08-20

## 2018-08-20 ENCOUNTER — OUTPATIENT CASE MANAGEMENT (OUTPATIENT)
Dept: ADMINISTRATIVE | Facility: OTHER | Age: 68
End: 2018-08-20

## 2018-08-20 VITALS
HEART RATE: 65 BPM | OXYGEN SATURATION: 99 % | SYSTOLIC BLOOD PRESSURE: 140 MMHG | DIASTOLIC BLOOD PRESSURE: 62 MMHG | RESPIRATION RATE: 20 BRPM | WEIGHT: 163.13 LBS | BODY MASS INDEX: 26.22 KG/M2 | TEMPERATURE: 99 F | HEIGHT: 66 IN

## 2018-08-20 DIAGNOSIS — N20.0 KIDNEY STONE: Primary | ICD-10-CM

## 2018-08-20 DIAGNOSIS — R60.0 LOWER EXTREMITY EDEMA: ICD-10-CM

## 2018-08-20 DIAGNOSIS — D50.8 OTHER IRON DEFICIENCY ANEMIA: ICD-10-CM

## 2018-08-20 DIAGNOSIS — E83.42 HYPOMAGNESEMIA: ICD-10-CM

## 2018-08-20 DIAGNOSIS — R11.0 NAUSEA IN ADULT: ICD-10-CM

## 2018-08-20 DIAGNOSIS — I10 ESSENTIAL HYPERTENSION: ICD-10-CM

## 2018-08-20 PROCEDURE — 99213 OFFICE O/P EST LOW 20 MIN: CPT | Mod: PBBFAC,PN | Performed by: FAMILY MEDICINE

## 2018-08-20 PROCEDURE — 99214 OFFICE O/P EST MOD 30 MIN: CPT | Mod: S$PBB,,, | Performed by: FAMILY MEDICINE

## 2018-08-20 PROCEDURE — 99999 PR PBB SHADOW E&M-EST. PATIENT-LVL III: CPT | Mod: PBBFAC,,, | Performed by: FAMILY MEDICINE

## 2018-08-20 RX ORDER — PROMETHAZINE HYDROCHLORIDE 25 MG/1
25 TABLET ORAL EVERY 6 HOURS PRN
Qty: 30 TABLET | Refills: 0 | Status: SHIPPED | OUTPATIENT
Start: 2018-08-20 | End: 2018-09-11 | Stop reason: SDUPTHER

## 2018-08-20 RX ORDER — LANOLIN ALCOHOL/MO/W.PET/CERES
400 CREAM (GRAM) TOPICAL DAILY
Qty: 30 TABLET | Refills: 2 | Status: SHIPPED | OUTPATIENT
Start: 2018-08-20 | End: 2018-09-19 | Stop reason: SDUPTHER

## 2018-08-20 NOTE — PROGRESS NOTES
HPI:  Patito Domingo is a 68 y.o. year old female that  presents fro f/u of lab results.She has been having some ankle edema bilaterally. She has not been taking hr diuretic consistently. SHe needs refils today.  Chief Complaint   Patient presents with    Follow-up     lab results    Edema     extremities     Medication Refill     Bumex, Nystatin, Promethazine, Tramadol   .     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)      Social History     Socioeconomic History    Marital status:      Spouse name: Not on file    Number of children: Not on file    Years of education: Not on file    Highest education level: Not on file   Social Needs    Financial resource strain: Not on file    Food insecurity - worry: Not on file    Food insecurity - inability: Not on file    Transportation needs - medical: Not on file    Transportation needs - non-medical: Not on file   Occupational History    Occupation: disabled    Tobacco Use    Smoking status: Never Smoker    Smokeless tobacco: Never Used   Substance and Sexual Activity    Alcohol use: No    Drug use: No    Sexual activity: Not Currently   Other Topics Concern    Not on file   Social History Narrative    Not on file     Past Surgical History:   Procedure Laterality Date    CATARACT EXTRACTION Left 10/2017    CHOLECYSTECTOMY      COLON SURGERY      cystoscope      EYE SURGERY      HYSTERECTOMY  5/1996    LITHOTRIPSY      LIVER BIOPSY  9/14    carcinoid     Family History   Problem Relation Age of Onset    Cancer Mother         unknown    Alzheimer's disease Father     Stroke Sister     No Known Problems Son     No Known Problems Son     No Known Problems Son     No Known Problems Son   "          Review of Systems  General ROS: negative for chills, fever or weight loss  ENT ROS: negative for epistaxis, sore throat or rhinorrhea  Respiratory ROS: no cough, shortness of breath, or wheezing  Cardiovascular ROS: no chest pain or dyspnea on exertion  Gastrointestinal ROS: no abdominal pain, change in bowel habits, or black/ bloody stools  EXTREM: POS ANKLE SWELLING    Physical Exam:  BP (!) 140/62   Pulse 65   Temp 98.5 °F (36.9 °C) (Oral)   Resp 20   Ht 5' 6" (1.676 m)   Wt 74 kg (163 lb 2.3 oz)   SpO2 99%   BMI 26.33 kg/m²   General appearance: alert, cooperative, no distress  Constitutional:Oriented to person, place, and time.appears well-developed and well-nourished.  HEENT: Normocephalic, atraumatic, neck symmetrical, no nasal discharge, TM- clear bilaterally  Lungs: clear to auscultation bilaterally, no dullness to percussion bilaterally  Heart: regular rate and rhythm without rub; no displacement of the PMI , S1&S2 present  Abdomen: soft, non-tender; bowel sounds normoactive; no organomegaly  EXTREM: Pos leatha pitting edema of ankles +2  Physical Exam    LABS:    Complete Blood Count  Lab Results   Component Value Date    RBC 3.44 (L) 08/23/2018    HGB 9.3 (L) 08/23/2018    HCT 29.4 (L) 08/23/2018    MCV 86 08/23/2018    MCH 27.0 08/23/2018    MCHC 31.6 (L) 08/23/2018    RDW 14.3 08/23/2018     08/23/2018    MPV 10.0 08/23/2018    GRAN 3.2 08/23/2018    GRAN 74.8 (H) 08/23/2018    LYMPH 0.7 (L) 08/23/2018    LYMPH 15.3 (L) 08/23/2018    MONO 0.4 08/23/2018    MONO 8.8 08/23/2018    EOS 0.0 08/23/2018    BASO 0.01 08/23/2018    EOSINOPHIL 0.7 08/23/2018    BASOPHIL 0.2 08/23/2018    DIFFMETHOD Automated 08/23/2018       Comprehensive Metabolic Panel  Lab Results   Component Value Date     08/23/2018    BUN 5 (L) 08/23/2018    CREATININE 0.8 08/23/2018     08/23/2018    K 3.5 08/23/2018    CL 99 08/23/2018    PROT 7.0 08/23/2018    ALBUMIN 3.2 (L) 08/23/2018    BILITOT 0.5 " 08/23/2018    AST 12 08/23/2018    ALKPHOS 93 08/23/2018    CO2 31 (H) 08/23/2018    ALT 6 (L) 08/23/2018    ANIONGAP 10 08/23/2018    EGFRNONAA >60 08/23/2018    ESTGFRAFRICA >60 08/23/2018       LIPID  Lab Results   Component Value Date    CHOL 100 (L) 04/28/2018    HDL 64 04/28/2018         TSH  Lab Results   Component Value Date    TSH 0.896 03/14/2016       Current Outpatient Medications   Medication Sig Dispense Refill    amLODIPine (NORVASC) 10 MG tablet Take 1 tablet (10 mg total) by mouth once daily. 30 tablet 11    cyanocobalamin 1,000 mcg/mL injection 1,000 mcg every 30 days.   0    diphenoxylate-atropine 2.5-0.025 mg (LOMOTIL) 2.5-0.025 mg per tablet TAKE 1 TABLET BY MOUTH 4 TIMES A DAY AS NEEDED FOR DIARRHEA 60 tablet 2    ferrous gluconate 324 mg (36 mg iron) Tab Take 1 tablet by mouth once daily.      gabapentin (NEURONTIN) 100 MG capsule TAKE 2 CAPSULES TWICE A DAY (Patient taking differently: takes one capsule BID) 120 capsule 6    hydroCHLOROthiazide (MICROZIDE) 12.5 mg capsule TAKE 1 CAPSULE (12.5 MG TOTAL) BY MOUTH ONCE DAILY. 30 capsule 2    ketorolac (TORADOL) 10 mg tablet Take 10 mg by mouth every 6 (six) hours as needed for Pain.      lanreotide (SOMATULINE DEPOT) 120 mg/0.5 mL Syrg Inject 120 mg into the skin every 28 days.      losartan (COZAAR) 50 MG tablet TAKE 1 TABLET BY MOUTH DAILY 30 tablet 2    magnesium oxide (MAG-OX) 400 mg tablet Take 1 tablet (400 mg total) by mouth once daily. 30 tablet 2    metoprolol tartrate (LOPRESSOR) 50 MG tablet Take 1 tablet (50 mg total) by mouth 2 (two) times daily. 180 tablet 0    nystatin (MYCOSTATIN) 100,000 unit/mL suspension SWISH AND SWALLOW 1 TEASPOON BY MOUTH 4 TIMES A DAY. ONCE RELIEVED CONTINUE FOR 48 HOURS      oxyCODONE (ROXICODONE) 30 MG Tab Take 30 mg by mouth every 4 (four) hours as needed.       promethazine (PHENERGAN) 25 MG tablet Take 1 tablet (25 mg total) by mouth every 6 (six) hours as needed for Nausea. 30 tablet 0     tamsulosin (FLOMAX) 0.4 mg Cap Take 1 capsule (0.4 mg total) by mouth once daily. 30 capsule 0    walker (ULTRA-LIGHT ROLLATOR) Misc 1 Units by Misc.(Non-Drug; Combo Route) route once daily. 1 each 0    bumetanide (BUMEX) 0.5 MG Tab Take 1 tablet (0.5 mg total) by mouth once daily. 30 tablet 2    traMADol (ULTRAM) 50 mg tablet Take 1 tablet (50 mg total) by mouth every 6 (six) hours as needed. 60 tablet 1     No current facility-administered medications for this visit.        Assessment:    ICD-10-CM ICD-9-CM    1. Kidney stone N20.0 592.0    2. Hypomagnesemia E83.42 275.2 magnesium oxide (MAG-OX) 400 mg tablet   3. Other iron deficiency anemia D50.8 280.8 ferrous gluconate 324 mg (36 mg iron) Tab   4. Nausea in adult R11.0 787.02 promethazine (PHENERGAN) 25 MG tablet   5. Essential hypertension I10 401.9    6. Lower extremity edema R60.0 782.3          Plan:    Follow-up in 4 weeks (on 9/19/2018).          Magdalena Hernandez MD

## 2018-08-20 NOTE — TELEPHONE ENCOUNTER
----- Message from Angela Cuba sent at 8/20/2018 10:03 AM CDT -----  Contact: Patient  BRIAN- Patient wants to have a gallium scan and to know what other tests she should have before seeing the doctor. Please call patient at [phone].

## 2018-08-20 NOTE — PROGRESS NOTES
8/20  Summary:  Contacted patient by phone for follow up visit today. Patient states she is still using her walker for stability at home .Patient denies falls since our last visit. Educated on fall prevention/home safety, ie. good lighting, clear pathways, no throw rugs and no electrical cords and patient voiced understanding/agreemnt. Patient states she is still having some back pain but is not as bad as it was previously. Patient states she uses a heating pad for back pain. Patient states she has had some nasal congestion but denies fever. Educated on s/s infection ie. fever, chills, productive cough and patient voiced understanding/agreement. Patient states she is taking all medications as directed. Reminded patient of appointment with Dr. Hernandez today at 1:40 and she is aware. Encouraged patient to call this RN if having any questions/concerns. OPCM willl continue to follow.       Interventions:  -Educated patient on Home Safety and Fall prevention and Infection prevention for Immunocompromised patient. Encouraged patient to follow medication and treatment regimen. Encouraged patient to maintain follow up with doctors.        Plan:  -Continue to educate about Home Safety and Fall prevention and Infection prevention for Immunocompromised patient.  Encourage patient to follow medication and treatment regimen. Encourage patient to maintain follow up with doctors.

## 2018-08-20 NOTE — TELEPHONE ENCOUNTER
Returned pts call. Discussed Gallium scan may not have openings, will discuss w/ NM  tomorrow and return call. Pt verbalizes understanding.

## 2018-08-21 ENCOUNTER — OUTPATIENT CASE MANAGEMENT (OUTPATIENT)
Dept: ADMINISTRATIVE | Facility: OTHER | Age: 68
End: 2018-08-21

## 2018-08-21 NOTE — PROGRESS NOTES
SUMMARY  Phoned patient. Patient stated that she is not feeling well today, still having problems with diarrhea. She stated that she is waiting for a call back today to confirm her next appointment for a procedure. Discussed change to a specialist closer to home. She said that she still needs to see her MD in [place]. Agreed to call if she needs confirmation to facilitate Medicaid transportation.   Patient stated that she has not seen HH, noted that SW called and confirmed active HH services and confirmed correct home address.   Patient agreed to call back by the  to assist with transportation issues.     INTERVENTION  Coordinated resources. Offered assistance with confirming need for Medicaid transportation to specialist.     PLAN  Follow up in one week.

## 2018-08-22 ENCOUNTER — TELEPHONE (OUTPATIENT)
Dept: FAMILY MEDICINE | Facility: CLINIC | Age: 68
End: 2018-08-22

## 2018-08-22 ENCOUNTER — TELEPHONE (OUTPATIENT)
Dept: NEUROLOGY | Facility: HOSPITAL | Age: 68
End: 2018-08-22

## 2018-08-22 RX ORDER — TRAMADOL HYDROCHLORIDE 50 MG/1
50 TABLET ORAL EVERY 6 HOURS PRN
Qty: 60 TABLET | Refills: 1 | Status: SHIPPED | OUTPATIENT
Start: 2018-08-22 | End: 2018-09-19 | Stop reason: SDUPTHER

## 2018-08-22 RX ORDER — TRAMADOL HYDROCHLORIDE 50 MG/1
50 TABLET ORAL EVERY 6 HOURS PRN
COMMUNITY
End: 2018-08-22 | Stop reason: SDUPTHER

## 2018-08-22 NOTE — TELEPHONE ENCOUNTER
Called pt to notify of Gallium scan scheduled for next Tuesday, pt tearful stating she does not know if she can find a ride on such short noticed. Advised that these time slots are very limited, so please try to arrange a ride, and if unable to, call clinic asap to reschedule. Pt verbalizes understanding.

## 2018-08-22 NOTE — TELEPHONE ENCOUNTER
Patient would like to have refill on Tramadol 50mg for kidney stone pain. She also states she think the Bumex is causing her to have tingling in her tongue. Please advise.

## 2018-08-22 NOTE — TELEPHONE ENCOUNTER
----- Message from Leona Wells sent at 8/22/2018  1:05 PM CDT -----  Contact: Self/ [phone]  Patient called in to request refill on her tramadol. She is having a lot kidney pain and needs medication until she sees a doctor.    Patient would like to tell you side effects she is having when taking bumetanide (BUMEX).   Tingly in her tongue.    Please call patient.

## 2018-08-23 ENCOUNTER — HOSPITAL ENCOUNTER (EMERGENCY)
Facility: HOSPITAL | Age: 68
Discharge: HOME | End: 2018-08-23
Attending: EMERGENCY MEDICINE
Payer: MEDICARE

## 2018-08-23 ENCOUNTER — OUTPATIENT CASE MANAGEMENT (OUTPATIENT)
Dept: ADMINISTRATIVE | Facility: OTHER | Age: 68
End: 2018-08-23

## 2018-08-23 VITALS
HEART RATE: 80 BPM | BODY MASS INDEX: 26.2 KG/M2 | DIASTOLIC BLOOD PRESSURE: 94 MMHG | RESPIRATION RATE: 18 BRPM | HEIGHT: 66 IN | WEIGHT: 163 LBS | OXYGEN SATURATION: 100 % | TEMPERATURE: 98 F | SYSTOLIC BLOOD PRESSURE: 199 MMHG

## 2018-08-23 DIAGNOSIS — R10.9 RIGHT FLANK PAIN: Primary | ICD-10-CM

## 2018-08-23 DIAGNOSIS — Z85.9 HISTORY OF MALIGNANT CARCINOID TUMOR: ICD-10-CM

## 2018-08-23 LAB
ALBUMIN SERPL BCP-MCNC: 3.2 G/DL
ALP SERPL-CCNC: 93 U/L
ALT SERPL W/O P-5'-P-CCNC: 6 U/L
ANION GAP SERPL CALC-SCNC: 10 MMOL/L
AST SERPL-CCNC: 12 U/L
BASOPHILS # BLD AUTO: 0.01 K/UL
BASOPHILS NFR BLD: 0.2 %
BILIRUB SERPL-MCNC: 0.5 MG/DL
BILIRUB UR QL STRIP: NEGATIVE
BUN SERPL-MCNC: 5 MG/DL
CALCIUM SERPL-MCNC: 9.3 MG/DL
CHLORIDE SERPL-SCNC: 99 MMOL/L
CLARITY UR: CLEAR
CO2 SERPL-SCNC: 31 MMOL/L
COLOR UR: YELLOW
CREAT SERPL-MCNC: 0.8 MG/DL
DIFFERENTIAL METHOD: ABNORMAL
EOSINOPHIL # BLD AUTO: 0 K/UL
EOSINOPHIL NFR BLD: 0.7 %
ERYTHROCYTE [DISTWIDTH] IN BLOOD BY AUTOMATED COUNT: 14.3 %
EST. GFR  (AFRICAN AMERICAN): >60 ML/MIN/1.73 M^2
EST. GFR  (NON AFRICAN AMERICAN): >60 ML/MIN/1.73 M^2
GLUCOSE SERPL-MCNC: 105 MG/DL
GLUCOSE UR QL STRIP: NEGATIVE
HCT VFR BLD AUTO: 29.4 %
HGB BLD-MCNC: 9.3 G/DL
HGB UR QL STRIP: ABNORMAL
KETONES UR QL STRIP: NEGATIVE
LEUKOCYTE ESTERASE UR QL STRIP: NEGATIVE
LIPASE SERPL-CCNC: 8 U/L
LYMPHOCYTES # BLD AUTO: 0.7 K/UL
LYMPHOCYTES NFR BLD: 15.3 %
MAGNESIUM SERPL-MCNC: 1.3 MG/DL
MCH RBC QN AUTO: 27 PG
MCHC RBC AUTO-ENTMCNC: 31.6 G/DL
MCV RBC AUTO: 86 FL
MONOCYTES # BLD AUTO: 0.4 K/UL
MONOCYTES NFR BLD: 8.8 %
NEUTROPHILS # BLD AUTO: 3.2 K/UL
NEUTROPHILS NFR BLD: 74.8 %
NITRITE UR QL STRIP: NEGATIVE
PH UR STRIP: 8 [PH] (ref 5–8)
PLATELET # BLD AUTO: 242 K/UL
PMV BLD AUTO: 10 FL
POTASSIUM SERPL-SCNC: 3.5 MMOL/L
PROT SERPL-MCNC: 7 G/DL
PROT UR QL STRIP: NEGATIVE
RBC # BLD AUTO: 3.44 M/UL
SODIUM SERPL-SCNC: 140 MMOL/L
SP GR UR STRIP: 1.01 (ref 1–1.03)
URN SPEC COLLECT METH UR: ABNORMAL
UROBILINOGEN UR STRIP-ACNC: NEGATIVE EU/DL
WBC # BLD AUTO: 4.3 K/UL

## 2018-08-23 PROCEDURE — 85025 COMPLETE CBC W/AUTO DIFF WBC: CPT

## 2018-08-23 PROCEDURE — 96361 HYDRATE IV INFUSION ADD-ON: CPT

## 2018-08-23 PROCEDURE — 83735 ASSAY OF MAGNESIUM: CPT

## 2018-08-23 PROCEDURE — 81003 URINALYSIS AUTO W/O SCOPE: CPT

## 2018-08-23 PROCEDURE — 80053 COMPREHEN METABOLIC PANEL: CPT

## 2018-08-23 PROCEDURE — 96375 TX/PRO/DX INJ NEW DRUG ADDON: CPT

## 2018-08-23 PROCEDURE — 83690 ASSAY OF LIPASE: CPT

## 2018-08-23 PROCEDURE — 25000003 PHARM REV CODE 250: Performed by: EMERGENCY MEDICINE

## 2018-08-23 PROCEDURE — 96374 THER/PROPH/DIAG INJ IV PUSH: CPT

## 2018-08-23 PROCEDURE — 63600175 PHARM REV CODE 636 W HCPCS: Performed by: EMERGENCY MEDICINE

## 2018-08-23 PROCEDURE — 99285 EMERGENCY DEPT VISIT HI MDM: CPT | Mod: 25

## 2018-08-23 PROCEDURE — 96376 TX/PRO/DX INJ SAME DRUG ADON: CPT

## 2018-08-23 RX ORDER — HYDROMORPHONE HYDROCHLORIDE 1 MG/ML
1 INJECTION, SOLUTION INTRAMUSCULAR; INTRAVENOUS; SUBCUTANEOUS
Status: COMPLETED | OUTPATIENT
Start: 2018-08-23 | End: 2018-08-23

## 2018-08-23 RX ORDER — ONDANSETRON 2 MG/ML
4 INJECTION INTRAMUSCULAR; INTRAVENOUS
Status: COMPLETED | OUTPATIENT
Start: 2018-08-23 | End: 2018-08-23

## 2018-08-23 RX ADMIN — ONDANSETRON 4 MG: 2 INJECTION, SOLUTION INTRAMUSCULAR; INTRAVENOUS at 10:08

## 2018-08-23 RX ADMIN — SODIUM CHLORIDE 1000 ML: 0.9 INJECTION, SOLUTION INTRAVENOUS at 10:08

## 2018-08-23 RX ADMIN — HYDROMORPHONE HYDROCHLORIDE 1 MG: 1 INJECTION, SOLUTION INTRAMUSCULAR; INTRAVENOUS; SUBCUTANEOUS at 10:08

## 2018-08-23 RX ADMIN — HYDROMORPHONE HYDROCHLORIDE 1 MG: 1 INJECTION, SOLUTION INTRAMUSCULAR; INTRAVENOUS; SUBCUTANEOUS at 01:08

## 2018-08-23 NOTE — PROGRESS NOTES
SUMMARY  Received voice mail message from patient. Returned phone call. Patient was distraught. She stated that she had been taking her pain medications prescribed for her cancer to deal with her kidney stone pain. She stated that she is home alone and is constantly going to the bathroom and is now covered in feces and urine. She explained that her son and daughter in law had gone to work.  Encouraged her to call 911 to go to the hospital. She said that she just wanted to end her pain. Explored the statement and she said that she wanted it to be over, she can't take care of herself. She identified that her plan would be to take her pills to commit suicide. [facility] handed note to OPCM RN, Ashley Kim. She called 911. Patient informed that someone is coming to help her. She was able to provide her address and directions to the home. She remained on the phone. She went to the bathroom but stayed on speaker phone. When the  arrived, he spoke to [facility] and confirmed that she had made a suicide threat. He confirmed that they were taking her to the hospital.     INTERVENTION  Crisis intervention.    PLAN  Follow up with patient after hospitalization.

## 2018-08-23 NOTE — ED NOTES
Pt here c/o right flank pain-denies urinary changes or fevers. Is able to ambulate with walker or minimal assistance x1. Skin WDL. deneis bowel changes except + nausea.

## 2018-08-23 NOTE — ED PROVIDER NOTES
Encounter Date: 8/23/2018    SCRIBE #1 NOTE: I, Herminia Russo, am scribing for, and in the presence of,  Dr. Robbins. I have scribed the entire note.       History     Chief Complaint   Patient presents with    Flank Pain     Right flank pain onset 3 weeks ago hx of kidney stones +nausea no difficulty urinating     Time seen by the provider: 10:17 AM    This is a 68 y.o. 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 The pain is constant and significantly more severe x 2 days. Pt reports associated N/V/D, subjective fever, and dysuria. Pt denies chills, SOB, or chest pain. She took Phenergan at home which relieved the nausea, and Oxycodone for the abdominal pain which provided mild relief. Patient reports a prior history of similar symptoms. She notes a history of kidney stones, SBO, and carcinoid tumor.        The history is provided by the patient.     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)      Past Surgical History:   Procedure Laterality Date    CATARACT EXTRACTION Left 10/2017    CHOLECYSTECTOMY      COLON SURGERY      cystoscope      EYE SURGERY      HYSTERECTOMY  5/1996    LITHOTRIPSY      LIVER BIOPSY  9/14    carcinoid     Family History   Problem  Relation Age of Onset    Cancer Mother         unknown    Alzheimer's disease Father     Stroke Sister     No Known Problems Son     No Known Problems Son     No Known Problems Son     No Known Problems Son      Social History     Tobacco Use    Smoking status: Never Smoker    Smokeless tobacco: Never Used   Substance Use Topics    Alcohol use: No    Drug use: No     Review of Systems   Constitutional: Positive for fever (subjective). Negative for activity change, appetite change, chills, diaphoresis and fatigue.   HENT: Negative for sore throat.    Respiratory: Negative for cough, chest tightness and shortness of breath.    Cardiovascular: Negative for chest pain and palpitations.   Gastrointestinal: Positive for diarrhea, nausea and vomiting. Negative for abdominal distention.   Endocrine: Negative for polydipsia and polyphagia.   Genitourinary: Positive for dysuria and flank pain. Negative for difficulty urinating.   Musculoskeletal: Negative for arthralgias.   Skin: Negative for pallor and rash.   Neurological: Negative for dizziness and headaches.   Psychiatric/Behavioral: Negative for confusion.   All other systems reviewed and are negative.      Physical Exam     Initial Vitals [08/23/18 1006]   BP Pulse Resp Temp SpO2   (!) 165/86 86 18 98.4 °F (36.9 °C) 100 %      MAP       --         Physical Exam    Nursing note and vitals reviewed.  Constitutional: She appears well-developed and well-nourished. She is not diaphoretic. No distress.   HENT:   Head: Normocephalic and atraumatic.   Mouth/Throat: Oropharynx is clear and moist and mucous membranes are normal.   Eyes: EOM are normal. Pupils are equal, round, and reactive to light. No scleral icterus.   Conjunctiva slightly pale.   Neck: Normal range of motion. Neck supple.   Cardiovascular: Normal rate, regular rhythm and normal heart sounds. Exam reveals no gallop and no friction rub.    No murmur heard.  Pulmonary/Chest: Breath sounds normal. She  has no wheezes. She has no rhonchi. She has no rales.   Abdominal: Soft. There is tenderness in the right upper quadrant and right lower quadrant. There is no rebound and no guarding.   Bowel sounds are hypoactive.   Musculoskeletal: Normal range of motion. She exhibits no edema or tenderness.   Neurological: She is alert and oriented to person, place, and time.   Skin: Skin is warm and dry. No rash noted. No erythema.   Psychiatric: She has a normal mood and affect. Her behavior is normal.         ED Course   Procedures  Labs Reviewed   URINALYSIS - Abnormal; Notable for the following components:       Result Value    Occult Blood UA Trace (*)     All other components within normal limits   CBC W/ AUTO DIFFERENTIAL - Abnormal; Notable for the following components:    RBC 3.44 (*)     Hemoglobin 9.3 (*)     Hematocrit 29.4 (*)     MCHC 31.6 (*)     Lymph # 0.7 (*)     Gran% 74.8 (*)     Lymph% 15.3 (*)     All other components within normal limits   COMPREHENSIVE METABOLIC PANEL - Abnormal; Notable for the following components:    CO2 31 (*)     BUN, Bld 5 (*)     Albumin 3.2 (*)     ALT 6 (*)     All other components within normal limits   MAGNESIUM - Abnormal; Notable for the following components:    Magnesium 1.3 (*)     All other components within normal limits   LIPASE          Imaging Results          CT Abdomen Pelvis  Without Contrast (Final result)  Result time 08/23/18 12:42:44    Final result by Renetta Snow MD (08/23/18 12:42:44)                 Impression:      Bilateral kidney calculi.    Mesenteric mass, unchanged.      Electronically signed by: Renetta Snow MD  Date:    08/23/2018  Time:    12:42             Narrative:    EXAMINATION:  CT ABDOMEN PELVIS WITHOUT CONTRAST    CLINICAL HISTORY:  abdominal pain;    TECHNIQUE:  Low dose axial images, sagittal and coronal reformations were obtained from the lung bases to the pubic symphysis.  Oral contrast was not  administered.    COMPARISON:  07/27/2018    FINDINGS:  The lung bases are clear.  The noncontrast appearance of the liver appears normal.  Cholecystectomy clips.  The stomach, pancreas, spleen, adrenal glands appear normal.  Bilateral   forming renal stones, the largest is at the lower pole of the right kidney, a staghorn calculus measuring about 3 cm in length.  No stones seen within the bilateral ureters or within the bladder.  There are punctate forming stones  also seen at the lower pole of the left kidney.  The aorta tapers normally.  Moderate stool retention.  Surgical bowel suture in the mid abdomen.  The soft tissue mass within the right mid abdomen region measuring 2.8 cm, unchanged.  Few upper normal nodes noted in the mesentery with minimal mesenteric stranding.  It is unchanged.  The bladder appears normal.  The uterus has been removed.  The ovaries have been removed.  The osseous structures demonstrate no osseous lesions.                                 Medical Decision Making:   Clinical Tests:   Lab Tests: Ordered and Reviewed  ED Management:  68-year-old female with right flank pain. Patient has a history of kidney stones as well as carcinoid tumor.  CT shows no ureteral or bladder stones.  Urinalysis is unremarkable. Patient was given IV Dilaudid here in the ED with good pain relief.  I suggested she continue her current pain medication at home as needed and follow up with the urologist, Dr. Marques, as soon as able.                      Clinical Impression:     1. Right flank pain    2. History of malignant carcinoid tumor               I, Dr. Brock Robbins, personally performed the services described in this documentation. All medical record entries made by the scribe were at my direction and in my presence. I have reviewed the chart and agree that the record reflects my personal performance and is accurate and complete. Brock Robbins MD.  5:46 PM 08/23/2018                 Brock Robbins,  MD  08/23/18 [id number]

## 2018-08-24 ENCOUNTER — OUTPATIENT CASE MANAGEMENT (OUTPATIENT)
Dept: ADMINISTRATIVE | Facility: OTHER | Age: 68
End: 2018-08-24

## 2018-08-24 ENCOUNTER — PES CALL (OUTPATIENT)
Dept: ADMINISTRATIVE | Facility: CLINIC | Age: 68
End: 2018-08-24

## 2018-08-24 NOTE — PROGRESS NOTES
SUMMARY  Phoned patient. Patient stated that she is still having pain with some relief. She reviewed her MD appointments with the PET scan on 8/28 and urology on 9/13. Patient stated that her daughter in law is not working today. She has gone to the store but will be with her most of the day. Patient verbalized no SI today.     INTERVENTION  Provided supportive counseling.   Review of medical appointments.     PLAN  Follow up to review support and resources.

## 2018-09-04 ENCOUNTER — OUTPATIENT CASE MANAGEMENT (OUTPATIENT)
Dept: ADMINISTRATIVE | Facility: OTHER | Age: 68
End: 2018-09-04

## 2018-09-04 NOTE — PROGRESS NOTES
[x] Called and reviewed disaster plan with patient/caregiver.  Provided emergency phone number for patient's [place].   [] Attempted to reach patient/caregiver to review disaster plan.  Left a voice message with the phone number for patient's [place] Office of Emergency Preparedness.     Reviewed with Patient/Caregiver:  [x] Patient to have a supply of water, non-perishable food items, flashlights and batteries  [x] Patient to bring all medications if evacuates:  at least a five day supply preferably in the medication bottles, in case displaced for longer than 5 days  [x] Patient to bring any DME needed (walkers, wheelchairs, shower chairs, nebulizer machine, etc.)   [] If Diabetic, patient to bring glucometer and monitoring supplies.   [x] If Hypertension, patient to bring blood pressure cuff  [] If CHF, patient to bring scale  [] If tube feeds, patient to bring tube feedings and feeding supplies.  [] If on oxygen,  patient to bring all oxygen supplies (Cannula, portable tanks, concentrator).  Patient will contact oxygen supply company to find out the nearest location of a supply company near evacuated location. ([employer]: [phone], [facility]: [phone], [facility]:[phone], [employer]: [phone]), [facility] -[phone].  [] If on hemodialysis, patient should already have a plan in place with dialysis center. If patient does not know where to evacuate to in order to be close to a dialysis center, patient/caregiver to reach out to regular dialysis center for further direction. ([facility]  service line: [phone], [facility]  service line [phone])  [] If receiving treatment at an infusion center, patient/caregiver to contact the infusion center to find out which infusion center they have a contract with where patient plans to evacuate.      Office of Emergency Preparedness Phone number:  [] [place]: [phone]  [] [place]: [phone]  [] [facility]  [facility]: [phone]  [x] [place]: [phone]  [] [place]: [phone]  [] [place]: [phone]  [] [facility]: [phone]  [] [facility]: [phone]  [] [facility]: [phone]  [] [place]: [phone]  [] [place]: [phone]  [] [place]: [phone]  [] [place]: [phone]    [] [facility]:  [phone]   [] [facility]:  [phone]   [] [facility]:  [phone]   [] [facility]:  [phone]   [] [facility]:  [phone]   [] [facility]: [phone]   [] [facility]:  [phone]   [] [facility]:  [phone]   [] [facility]:  [phone]   [] [facility]:  [phone]   [] [facility]:  [phone]   [] [facility]:  [phone]   [] [facility]:  [phone]   [] [facility]:  [phone]   [] [facility]:  [phone]   [] [facility]: [phone]   [] [facility]:  [phone]   [] [facility]: [phone]   [] [facility]: [phone]

## 2018-09-05 ENCOUNTER — OUTPATIENT CASE MANAGEMENT (OUTPATIENT)
Dept: ADMINISTRATIVE | Facility: OTHER | Age: 68
End: 2018-09-05

## 2018-09-05 NOTE — PROGRESS NOTES
SUMMARY   Phoned patient. Patient stated that she is feeling better today. Her daughter in law returned to work today. She reported that the PET scan has been re-scheduled until she can see her urologist first. She stated that Medicaid transportation has agreed to take her to her urology appointment on 9/13/18.   Patient agreed to follow up by the . No needs identified today.     INTERVENTION  Reviewed transportation issues. Resolved Medicaid transportation approved.   Provided supportive counseling. No SI present.     PLAN  Follow up to review support and resources.

## 2018-09-06 RX ORDER — PROMETHAZINE HYDROCHLORIDE 25 MG/1
25 TABLET ORAL EVERY 6 HOURS PRN
Qty: 30 TABLET | Refills: 0 | OUTPATIENT
Start: 2018-09-06

## 2018-09-06 NOTE — TELEPHONE ENCOUNTER
----- Message from Yris Martins sent at 9/6/2018  2:11 PM CDT -----  Contact: [phone]  Pt requesting a refill on rx promethazine (PHENERGAN) 25 MG tablet sent to [facility] [phone]. Please advise

## 2018-09-08 RX ORDER — LOSARTAN POTASSIUM 50 MG/1
TABLET ORAL
Qty: 30 TABLET | Refills: 2 | Status: SHIPPED | OUTPATIENT
Start: 2018-09-08 | End: 2018-12-13 | Stop reason: SDUPTHER

## 2018-09-10 ENCOUNTER — OUTPATIENT CASE MANAGEMENT (OUTPATIENT)
Dept: ADMINISTRATIVE | Facility: OTHER | Age: 68
End: 2018-09-10

## 2018-09-11 ENCOUNTER — TELEPHONE (OUTPATIENT)
Dept: NEUROLOGY | Facility: HOSPITAL | Age: 68
End: 2018-09-11

## 2018-09-11 ENCOUNTER — OUTPATIENT CASE MANAGEMENT (OUTPATIENT)
Dept: ADMINISTRATIVE | Facility: OTHER | Age: 68
End: 2018-09-11

## 2018-09-11 DIAGNOSIS — R11.0 NAUSEA IN ADULT: ICD-10-CM

## 2018-09-11 RX ORDER — PROMETHAZINE HYDROCHLORIDE 25 MG/1
25 TABLET ORAL EVERY 6 HOURS PRN
Qty: 30 TABLET | Refills: 0 | Status: SHIPPED | OUTPATIENT
Start: 2018-09-11 | End: 2018-10-30 | Stop reason: SDUPTHER

## 2018-09-11 NOTE — TELEPHONE ENCOUNTER
----- Message from Rosalva Capone sent at 9/11/2018  9:58 AM CDT -----  Contact: Patient  BRIAN:  Patient called, is curious if her Gallium has been set up at [facility] as was discussed at some point?  She can be reached at [phone].  Thank you  abc

## 2018-09-11 NOTE — TELEPHONE ENCOUNTER
Returned call and LVM about galliums at [facility] not being done because they do not have the machine.  It is either  or here at [place].  Also LVM about her being a no show for her last gallium scan and the dose went to waste and was very expensive.  LVM for her to call back.

## 2018-09-11 NOTE — TELEPHONE ENCOUNTER
----- Message from Maria Eugenia Davis sent at 9/11/2018  1:25 PM CDT -----  Contact: [phone]  Patient would like refill of promethazine (PHENERGAN) 25 MG tablet sent to [facility]/PHARMACY #[id number]. Please call.

## 2018-09-11 NOTE — PROGRESS NOTES
9/11  Summary:  Contacted patient by phone for follow up visit today. Patient denies falls since our last visit and is still using her walker for stability and states she goes slow. Continued to educate on fall prevention/home safety, ie. good lighting, clear pathways, no throw rugs and no electrical cords and patient voiced understanding/agreemnt. patient states she has been having a little of irritation when she urinates and will tell HH nurse today at visit to see if she wants to do a test on her urine. Educated on s/s infection ie. fever, chills, productive cough and patient voiced understanding/agreement. Reminded patient that she has an appoinmtnet with Urology on 9/13 at 11:30 and she is aware. Patient states she is taking all medications as directed. Encouraged patient to call this RN if having any questions/concerns. OPCM willl continue to follow.       Interventions:  Continued to educate on Home Safety and Fall prevention and s/s of Infection. Encouraged patient to follow medication and treatment regimen. Encouraged patient to maintain follow up with doctors.        Plan:  -Continue to educate about Home Safety and Fall prevention and s/s of Infection.

## 2018-09-12 ENCOUNTER — TELEPHONE (OUTPATIENT)
Dept: NEUROLOGY | Facility: HOSPITAL | Age: 68
End: 2018-09-12

## 2018-09-12 NOTE — TELEPHONE ENCOUNTER
Returned pts call. Per Dr. Perez, we will not be scheduling pt for anymore Gallium scans due to recurrent no-shows. No answer. LVM to return call. Awaiting call back.

## 2018-09-12 NOTE — TELEPHONE ENCOUNTER
----- Message from Katalina Gaytan sent at 9/12/2018 12:07 PM CDT -----  Contact: patient  BRIAN- patient needs to schedule her gallium scan but she requires 3 day notice before because she needs to book transportation. Call back number [phone]

## 2018-09-12 NOTE — TELEPHONE ENCOUNTER
Took call directly from from patient and informed her that Dr Perry does not need a gallium 68 scan and scheduled the appt for Monday , September 24 at 11 AM.  Patient will call tomorrow to arrange transportation and let us know if she will not be able to make it then.

## 2018-09-13 ENCOUNTER — TELEPHONE (OUTPATIENT)
Dept: FAMILY MEDICINE | Facility: CLINIC | Age: 68
End: 2018-09-13

## 2018-09-13 NOTE — TELEPHONE ENCOUNTER
----- Message from Yris Martins sent at 9/13/2018  1:18 PM CDT -----  Contact: [phone]/Tiffani with [facility]   Ms Nixon called stating pt its com planing of pain when she urinates , and Ms Nixon its requesting an order for a urine specimen ASAP . Please advise

## 2018-09-19 ENCOUNTER — TELEPHONE (OUTPATIENT)
Dept: FAMILY MEDICINE | Facility: CLINIC | Age: 68
End: 2018-09-19

## 2018-09-19 ENCOUNTER — OFFICE VISIT (OUTPATIENT)
Dept: FAMILY MEDICINE | Facility: CLINIC | Age: 68
End: 2018-09-19
Payer: MEDICARE

## 2018-09-19 ENCOUNTER — OUTPATIENT CASE MANAGEMENT (OUTPATIENT)
Dept: ADMINISTRATIVE | Facility: OTHER | Age: 68
End: 2018-09-19

## 2018-09-19 VITALS
OXYGEN SATURATION: 99 % | SYSTOLIC BLOOD PRESSURE: 150 MMHG | HEIGHT: 66 IN | HEART RATE: 69 BPM | TEMPERATURE: 98 F | BODY MASS INDEX: 24.59 KG/M2 | DIASTOLIC BLOOD PRESSURE: 70 MMHG | WEIGHT: 153 LBS | RESPIRATION RATE: 18 BRPM

## 2018-09-19 DIAGNOSIS — R60.0 LOWER EXTREMITY EDEMA: ICD-10-CM

## 2018-09-19 DIAGNOSIS — B37.0 THRUSH: ICD-10-CM

## 2018-09-19 DIAGNOSIS — N39.0 URINARY TRACT INFECTION WITH HEMATURIA, SITE UNSPECIFIED: Primary | ICD-10-CM

## 2018-09-19 DIAGNOSIS — R31.9 URINARY TRACT INFECTION WITH HEMATURIA, SITE UNSPECIFIED: Primary | ICD-10-CM

## 2018-09-19 DIAGNOSIS — N20.0 KIDNEY STONE: ICD-10-CM

## 2018-09-19 DIAGNOSIS — L30.9 DERMATITIS: ICD-10-CM

## 2018-09-19 DIAGNOSIS — I10 ESSENTIAL HYPERTENSION: ICD-10-CM

## 2018-09-19 DIAGNOSIS — G89.29 CHRONIC MIDLINE LOW BACK PAIN, WITH SCIATICA PRESENCE UNSPECIFIED: ICD-10-CM

## 2018-09-19 DIAGNOSIS — M25.562 CHRONIC PAIN OF LEFT KNEE: ICD-10-CM

## 2018-09-19 DIAGNOSIS — M54.5 CHRONIC MIDLINE LOW BACK PAIN, WITH SCIATICA PRESENCE UNSPECIFIED: ICD-10-CM

## 2018-09-19 DIAGNOSIS — G89.29 CHRONIC PAIN OF LEFT KNEE: ICD-10-CM

## 2018-09-19 DIAGNOSIS — E83.42 HYPOMAGNESEMIA: ICD-10-CM

## 2018-09-19 PROCEDURE — 99999 PR PBB SHADOW E&M-EST. PATIENT-LVL III: CPT | Mod: PBBFAC,,, | Performed by: FAMILY MEDICINE

## 2018-09-19 PROCEDURE — 99214 OFFICE O/P EST MOD 30 MIN: CPT | Mod: S$PBB,,, | Performed by: FAMILY MEDICINE

## 2018-09-19 PROCEDURE — 99213 OFFICE O/P EST LOW 20 MIN: CPT | Mod: PBBFAC,PN | Performed by: FAMILY MEDICINE

## 2018-09-19 RX ORDER — NITROFURANTOIN (MACROCRYSTALS) 100 MG/1
100 CAPSULE ORAL EVERY 12 HOURS
Qty: 20 CAPSULE | Refills: 0 | Status: SHIPPED | OUTPATIENT
Start: 2018-09-19 | End: 2018-09-19 | Stop reason: SDUPTHER

## 2018-09-19 RX ORDER — GABAPENTIN 100 MG/1
CAPSULE ORAL
Qty: 120 CAPSULE | Refills: 6 | Status: SHIPPED | OUTPATIENT
Start: 2018-09-19 | End: 2019-05-22

## 2018-09-19 RX ORDER — LANOLIN ALCOHOL/MO/W.PET/CERES
400 CREAM (GRAM) TOPICAL DAILY
Qty: 30 TABLET | Refills: 2 | Status: SHIPPED | OUTPATIENT
Start: 2018-09-19 | End: 2019-02-13 | Stop reason: SDUPTHER

## 2018-09-19 RX ORDER — BUMETANIDE 0.5 MG/1
0.5 TABLET ORAL DAILY
Qty: 30 TABLET | Refills: 2 | Status: SHIPPED | OUTPATIENT
Start: 2018-09-19 | End: 2019-06-06 | Stop reason: SDUPTHER

## 2018-09-19 RX ORDER — DIPHENOXYLATE HYDROCHLORIDE AND ATROPINE SULFATE 2.5; .025 MG/1; MG/1
TABLET ORAL
Qty: 60 TABLET | Refills: 2 | Status: SHIPPED | OUTPATIENT
Start: 2018-09-19 | End: 2018-11-14 | Stop reason: SDUPTHER

## 2018-09-19 RX ORDER — TRIAMCINOLONE ACETONIDE 1 MG/G
OINTMENT TOPICAL 2 TIMES DAILY
Qty: 30 G | Refills: 1 | Status: ON HOLD | OUTPATIENT
Start: 2018-09-19 | End: 2018-12-04 | Stop reason: HOSPADM

## 2018-09-19 RX ORDER — TAMSULOSIN HYDROCHLORIDE 0.4 MG/1
0.4 CAPSULE ORAL DAILY
Qty: 30 CAPSULE | Refills: 2 | Status: SHIPPED | OUTPATIENT
Start: 2018-09-19 | End: 2018-09-24

## 2018-09-19 RX ORDER — TRAMADOL HYDROCHLORIDE 50 MG/1
50 TABLET ORAL EVERY 6 HOURS PRN
Qty: 60 TABLET | Refills: 2 | Status: SHIPPED | OUTPATIENT
Start: 2018-09-19 | End: 2018-10-18

## 2018-09-19 RX ORDER — NYSTATIN 100000 [USP'U]/ML
SUSPENSION ORAL
Qty: 60 BOTTLE | Refills: 0 | Status: ON HOLD | OUTPATIENT
Start: 2018-09-19 | End: 2018-12-04 | Stop reason: HOSPADM

## 2018-09-19 RX ORDER — NITROFURANTOIN (MACROCRYSTALS) 100 MG/1
100 CAPSULE ORAL EVERY 12 HOURS
Qty: 20 CAPSULE | Refills: 0 | Status: SHIPPED | OUTPATIENT
Start: 2018-09-19 | End: 2018-09-29

## 2018-09-19 RX ORDER — METOPROLOL TARTRATE 50 MG/1
50 TABLET ORAL 2 TIMES DAILY
Qty: 180 TABLET | Refills: 0 | Status: SHIPPED | OUTPATIENT
Start: 2018-09-19 | End: 2019-02-13 | Stop reason: SDUPTHER

## 2018-09-19 NOTE — TELEPHONE ENCOUNTER
----- Message from Maria Eugenia Davis sent at 9/19/2018  3:27 PM CDT -----  Contact: [phone]  Patient requested to speak with the nurse about her referral. Please call.

## 2018-09-19 NOTE — PROGRESS NOTES
Summary:Phoned patient. Discussed transportation issues for medical appointments. She stated that the transportation provided has changed the guidelines to needing notification four days prior to the transportation and transportation cannot be guaranteed. She stated that she has been utilizing [employer] transportation, not Medicaid transportation. Noted that the insurance on her chart has changed to Medicare/Medicaid. Patient stated that she has [employer] primary. Patient stated that she has set up transportation for today's appointment and the appointment on 9/24/18. Encouraged patient to call if transportation does not come to pick her up today. Confirmed call back for LCSW.     Interventions:Phoned [employer], [employer] transportation, confirmed the trip for today.   Provided clarification on transportation services.   Collaborated with OPCM RN for verification of insurance benefits/services.     Plan:Follow up in two weeks. Provided Medicaid transportation phone for transportation outside of the radius that [employer] covers.     [facility] Self[facility] Plan was developed with the patients/caregivers input and was based on identified barriers from todays assessment.  Goals were written today with the patient/caregiver and the patient has agreed to work towards these goals to improve his/her overall well-being. Patient verbalized understanding of the care plan, goals, and all of today's instructions. Encouraged patient/caregiver to communicate with his/her physician and health care team about health conditions and the treatment plan.  Provided my contact information today and encouraged patient/caregiver to call me with any questions as needed.

## 2018-09-19 NOTE — PROGRESS NOTES
HPI:  Patito Domingo is a 68 y.o. year old female that  presents for follow-up.  Patient states that she has not been to the urologist as of yet because of the distance and not having transportation.  She still has not followed up on her kidney stones due to this.  Patient states that she feels okay today but still has some days of pain.  She still has not received her Rollator walker.    Chief Complaint   Patient presents with    Follow-up     case managment states pt may need apt with Urology to discuss kidney stones--results   .     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)      Social History     Socioeconomic History    Marital status:      Spouse name: Not on file    Number of children: Not on file    Years of education: Not on file    Highest education level: Not on file   Social Needs    Financial resource strain: Not on file    Food insecurity - worry: Not on file    Food insecurity - inability: Not on file    Transportation needs - medical: Not on file    Transportation needs - non-medical: Not on file   Occupational History    Occupation: disabled    Tobacco Use    Smoking status: Never Smoker    Smokeless tobacco: Never Used   Substance and Sexual Activity    Alcohol use: No    Drug use: No    Sexual activity: Not Currently   Other Topics Concern    Not on file   Social History Narrative    Not on file     Past Surgical History:   Procedure Laterality Date    CATARACT EXTRACTION Left 10/2017    CHOLECYSTECTOMY      CHOLECYSTECTOMY N/A 12/30/2017    [facility]    COLON SURGERY      cystoscope      EYE SURGERY      HYSTERECTOMY  5/1996    INSERTION-INTRAOCULAR LENS (IOL) Left 10/4/2017     "Performed by [facility] at [facility] OR    LITHOTRIPSY      LIVER BIOPSY  9/14    carcinoid    PHACOEMULSIFICATION-ASPIRATION-CATARACT Left 10/4/2017    [facility] at [facility] OR     Family History   Problem Relation Age of Onset    Cancer Mother         unknown    Alzheimer's disease Father     Stroke Sister     No Known Problems Son     No Known Problems Son     No Known Problems Son     No Known Problems Son            Review of Systems  General ROS: negative for chills, fever or weight loss  ENT ROS: negative for epistaxis, sore throat or rhinorrhea  Respiratory ROS: no cough, shortness of breath, or wheezing  Cardiovascular ROS: no chest pain or dyspnea on exertion  Gastrointestinal ROS: no abdominal pain, change in bowel habits, or black/ bloody stools    Physical Exam:  BP (!) 150/70   Pulse 69   Temp 98.2 °F (36.8 °C) (Oral)   Resp 18   Ht 5' 6" (1.676 m)   Wt 69.4 kg (153 lb)   SpO2 99%   BMI 24.69 kg/m²   General appearance: alert, cooperative, no distress  Constitutional:Oriented to person, place, and time.appears well-developed and well-nourished.  HEENT: Normocephalic, atraumatic, neck symmetrical, no nasal discharge, TM- clear bilaterally  Lungs: clear to auscultation bilaterally, no dullness to percussion bilaterally  Heart: regular rate and rhythm without rub; no displacement of the PMI , S1&S2 present  Abdomen: soft, non-tender; bowel sounds normoactive; no organomegaly  Physical Exam    LABS:    Complete Blood Count  Lab Results   Component Value Date    RBC 3.44 (L) 08/23/2018    HGB 9.3 (L) 08/23/2018    HCT 29.4 (L) 08/23/2018    MCV 86 08/23/2018    MCH 27.0 08/23/2018    MCHC 31.6 (L) 08/23/2018    RDW 14.3 08/23/2018     08/23/2018    MPV 10.0 08/23/2018    GRAN 3.2 08/23/2018    GRAN 74.8 (H) 08/23/2018    LYMPH 0.7 (L) 08/23/2018    LYMPH 15.3 (L) 08/23/2018    MONO 0.4 08/23/2018    MONO 8.8 08/23/2018    EOS 0.0 08/23/2018    BASO 0.01 08/23/2018    " EOSINOPHIL 0.7 08/23/2018    BASOPHIL 0.2 08/23/2018    DIFFMETHOD Automated 08/23/2018       Comprehensive Metabolic Panel  Lab Results   Component Value Date     08/23/2018    BUN 5 (L) 08/23/2018    CREATININE 0.8 08/23/2018     08/23/2018    K 3.5 08/23/2018    CL 99 08/23/2018    PROT 7.0 08/23/2018    ALBUMIN 3.2 (L) 08/23/2018    BILITOT 0.5 08/23/2018    AST 12 08/23/2018    ALKPHOS 93 08/23/2018    CO2 31 (H) 08/23/2018    ALT 6 (L) 08/23/2018    ANIONGAP 10 08/23/2018    EGFRNONAA >60 08/23/2018    ESTGFRAFRICA >60 08/23/2018       LIPID  Lab Results   Component Value Date    CHOL 100 (L) 04/28/2018    HDL 64 04/28/2018         TSH  Lab Results   Component Value Date    TSH 0.896 03/14/2016       Current Outpatient Medications   Medication Sig Dispense Refill    bumetanide (BUMEX) 0.5 MG Tab Take 1 tablet (0.5 mg total) by mouth once daily. 30 tablet 2    diphenoxylate-atropine 2.5-0.025 mg (LOMOTIL) 2.5-0.025 mg per tablet TAKE 1 TABLET BY MOUTH 4 TIMES A DAY AS NEEDED FOR DIARRHEA 60 tablet 2    ferrous gluconate 324 mg (36 mg iron) Tab Take 1 tablet by mouth once daily.      gabapentin (NEURONTIN) 100 MG capsule takes one capsule  capsule 6    lanreotide (SOMATULINE DEPOT) 120 mg/0.5 mL Syrg Inject 120 mg into the skin every 28 days.      losartan (COZAAR) 50 MG tablet TAKE 1 TABLET BY MOUTH DAILY 30 tablet 2    magnesium oxide (MAG-OX) 400 mg tablet Take 1 tablet (400 mg total) by mouth once daily. 30 tablet 2    metoprolol tartrate (LOPRESSOR) 50 MG tablet Take 1 tablet (50 mg total) by mouth 2 (two) times daily. 180 tablet 0    nystatin (MYCOSTATIN) 100,000 unit/mL suspension SWISH AND SWALLOW 1 TEASPOON BY MOUTH 4 TIMES A DAY. ONCE RELIEVED CONTINUE FOR 48 HOURS 60 Bottle 0    oxyCODONE (ROXICODONE) 30 MG Tab Take 30 mg by mouth every 4 (four) hours as needed.       promethazine (PHENERGAN) 25 MG tablet TAKE 1 TABLET (25 MG TOTAL) BY MOUTH EVERY 6 (SIX) HOURS AS NEEDED  FOR NAUSEA. 30 tablet 0    tamsulosin (FLOMAX) 0.4 mg Cap Take 1 capsule (0.4 mg total) by mouth once daily. 30 capsule 2    traMADol (ULTRAM) 50 mg tablet Take 1 tablet (50 mg total) by mouth every 6 (six) hours as needed. 60 tablet 2    walker (ULTRA-LIGHT ROLLATOR) Misc 1 Units by Misc.(Non-Drug; Combo Route) route once daily. 1 each 0    cyanocobalamin 1,000 mcg/mL injection 1,000 mcg every 30 days.   0    nitrofurantoin (MACRODANTIN) 100 MG capsule Take 1 capsule (100 mg total) by mouth every 12 (twelve) hours. for 10 days 20 capsule 0    triamcinolone acetonide 0.1% (KENALOG) 0.1 % ointment Apply topically 2 (two) times daily. for 10 days 30 g 1     No current facility-administered medications for this visit.        Assessment:    ICD-10-CM ICD-9-CM    1. Urinary tract infection with hematuria, site unspecified N39.0 599.0 nitrofurantoin (MACRODANTIN) 100 MG capsule    R31.9  DISCONTINUED: nitrofurantoin (MACRODANTIN) 100 MG capsule      DISCONTINUED: nitrofurantoin (MACRODANTIN) 100 MG capsule   2. Lower extremity edema R60.0 782.3 walker (ULTRA-LIGHT ROLLATOR) Misc      bumetanide (BUMEX) 0.5 MG Tab      DISCONTINUED: walker (ULTRA-LIGHT ROLLATOR) Misc   3. Chronic midline low back pain, with sciatica presence unspecified M54.5 724.2 walker (ULTRA-LIGHT ROLLATOR) Misc    G89.29 338.29 traMADol (ULTRAM) 50 mg tablet      DISCONTINUED: walker (ULTRA-LIGHT ROLLATOR) Misc   4. Kidney stone N20.0 592.0 tamsulosin (FLOMAX) 0.4 mg Cap      traMADol (ULTRAM) 50 mg tablet   5. Hypomagnesemia E83.42 275.2 magnesium oxide (MAG-OX) 400 mg tablet   6. Chronic pain of left knee M25.562 719.46 traMADol (ULTRAM) 50 mg tablet    G89.29 338.29 KNEE BRACE FOR HOME USE   7. Dermatitis L30.9 692.9 triamcinolone acetonide 0.1% (KENALOG) 0.1 % ointment   8. Thrush B37.0 112.0 nystatin (MYCOSTATIN) 100,000 unit/mL suspension   9. Essential hypertension I10 401.9 metoprolol tartrate (LOPRESSOR) 50 MG tablet          Plan:  After reviewing urinalysis result will start on Macrodantin.  Follow-up in 3 months (on 12/19/2018).          Magdalena Hernandez MD

## 2018-09-21 DIAGNOSIS — Z12.11 COLON CANCER SCREENING: ICD-10-CM

## 2018-09-24 ENCOUNTER — OFFICE VISIT (OUTPATIENT)
Dept: NEUROLOGY | Facility: HOSPITAL | Age: 68
End: 2018-09-24
Attending: SURGERY
Payer: MEDICARE

## 2018-09-24 VITALS
DIASTOLIC BLOOD PRESSURE: 56 MMHG | SYSTOLIC BLOOD PRESSURE: 138 MMHG | TEMPERATURE: 100 F | HEART RATE: 67 BPM | WEIGHT: 152.31 LBS | HEIGHT: 66 IN | BODY MASS INDEX: 24.48 KG/M2

## 2018-09-24 DIAGNOSIS — C7B.00 METASTATIC CARCINOID TUMOR: Primary | ICD-10-CM

## 2018-09-24 DIAGNOSIS — R19.7 DIARRHEA, UNSPECIFIED TYPE: ICD-10-CM

## 2018-09-24 PROCEDURE — 99214 OFFICE O/P EST MOD 30 MIN: CPT | Performed by: SURGERY

## 2018-09-24 NOTE — PATIENT INSTRUCTIONS
MRI and OctreoScan -- Call [phone] to schedule   Call Yoletet (nurse) after MRI and Octreoscan and we will add you to tumor board

## 2018-09-24 NOTE — PROGRESS NOTES
"NOLANETS:  [facility]  A collaboration between [facility] and [facility]      PATIENT: Patito Domingo  MRN: [id number]  DATE: 9/24/2018    Subjective:      Chief Complaint: Follow-up (reveiw ct scans and no gallium scan necessary per Dr Perez)    C/o lot of diarrhea    Having cold now with low grade temp  She was accompanied by her son  .  She was previously scheduled for MRI and gallium scan.  Patient did not show up. Even with isotope was ready the gallium scan was canceled and isotope was wasted.    Vitals:   Vitals:    09/24/18 1114   BP: (!) 138/56   Pulse: 67   Temp: (!) 100.4 °F (38 °C)   TempSrc: Oral   Weight: 69.1 kg (152 lb 5.4 oz)   Height: 5' 6" (1.676 m)        Karnofsky Score:     Diagnosis: No diagnosis found.     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)        Past Surgical History:  Past Surgical History:   Procedure Laterality Date    CATARACT EXTRACTION Left 10/2017    CHOLECYSTECTOMY      CHOLECYSTECTOMY N/A 12/30/2017    [facility] by [facility] at [facility] OR    COLON SURGERY      cystoscope      EYE SURGERY      HYSTERECTOMY  5/1996    INSERTION-INTRAOCULAR LENS (IOL) Left 10/4/2017    [facility] at [facility] OR    LITHOTRIPSY      LIVER BIOPSY  9/14    carcinoid    PHACOEMULSIFICATION-ASPIRATION-CATARACT Left 10/4/2017    Performed by [facility] at [facility] OR       Family History:  Family History   Problem Relation Age of Onset    Cancer Mother         unknown    Alzheimer's disease Father     Stroke Sister     No Known Problems Son     No Known Problems Son     No " Known Problems Son     No Known Problems Son        Allergies:  Review of patient's allergies indicates:   Allergen Reactions    Epinephrine Anaphylaxis     Can cause  a Carcinoid Crisis    Contrast media Hives, Itching and Swelling    Ibuprofen Hives, Itching and Swelling    Iodinated contrast- oral and iv dye     Sulfa (sulfonamide antibiotics) Hives, Itching and Swelling       Medications:  Current Outpatient Medications   Medication Sig Dispense Refill    bumetanide (BUMEX) 0.5 MG Tab Take 1 tablet (0.5 mg total) by mouth once daily. 30 tablet 2    diphenoxylate-atropine 2.5-0.025 mg (LOMOTIL) 2.5-0.025 mg per tablet TAKE 1 TABLET BY MOUTH 4 TIMES A DAY AS NEEDED FOR DIARRHEA 60 tablet 2    ferrous gluconate 324 mg (36 mg iron) Tab Take 1 tablet by mouth once daily.      gabapentin (NEURONTIN) 100 MG capsule takes one capsule  capsule 6    losartan (COZAAR) 50 MG tablet TAKE 1 TABLET BY MOUTH DAILY 30 tablet 2    magnesium oxide (MAG-OX) 400 mg tablet Take 1 tablet (400 mg total) by mouth once daily. 30 tablet 2    metoprolol tartrate (LOPRESSOR) 50 MG tablet Take 1 tablet (50 mg total) by mouth 2 (two) times daily. 180 tablet 0    nitrofurantoin (MACRODANTIN) 100 MG capsule Take 1 capsule (100 mg total) by mouth every 12 (twelve) hours. for 10 days 20 capsule 0    nystatin (MYCOSTATIN) 100,000 unit/mL suspension SWISH AND SWALLOW 1 TEASPOON BY MOUTH 4 TIMES A DAY. ONCE RELIEVED CONTINUE FOR 48 HOURS (Patient taking differently: as needed. SWISH AND SWALLOW 1 TEASPOON BY MOUTH 4 TIMES A DAY. ONCE RELIEVED CONTINUE FOR 48 HOURS) 60 Bottle 0    oxyCODONE (ROXICODONE) 30 MG Tab Take 30 mg by mouth every 4 (four) hours as needed.       promethazine (PHENERGAN) 25 MG tablet TAKE 1 TABLET (25 MG TOTAL) BY MOUTH EVERY 6 (SIX) HOURS AS NEEDED FOR NAUSEA. 30 tablet 0    traMADol (ULTRAM) 50 mg tablet Take 1 tablet (50 mg total) by mouth every 6 (six) hours as needed. 60 tablet 2    triamcinolone  acetonide 0.1% (KENALOG) 0.1 % ointment Apply topically 2 (two) times daily. for 10 days 30 g 1    walker (ULTRA-LIGHT ROLLATOR) Misc 1 Units by Misc.(Non-Drug; Combo Route) route once daily. 1 each 0    cyanocobalamin 1,000 mcg/mL injection 1,000 mcg every 30 days.   0     No current facility-administered medications for this visit.        Review of Systems   Constitutional: Positive for fatigue and fever. Negative for activity change, appetite change, chills, diaphoresis and unexpected weight change.   HENT: Negative for congestion, dental problem, drooling, ear discharge, ear pain, facial swelling, hearing loss, mouth sores, postnasal drip, sneezing, sore throat and tinnitus.    Eyes: Positive for visual disturbance. Negative for photophobia, pain, discharge and redness.   Respiratory: Negative for apnea, cough, choking, chest tightness, shortness of breath, wheezing and stridor.    Cardiovascular: Negative for chest pain, palpitations and leg swelling.   Gastrointestinal: Positive for abdominal distention and abdominal pain. Negative for blood in stool, diarrhea, nausea, rectal pain and vomiting.   Endocrine: Negative.    Genitourinary: Negative for hematuria (has kidney stone).   Neurological: Negative for tremors, seizures, syncope, facial asymmetry, weakness, light-headedness, numbness and headaches.   Hematological: Negative.       Objective:      Physical Exam   Constitutional: She is oriented to person, place, and time. She appears well-developed. No distress.   HENT:   Head: Normocephalic and atraumatic.   Right Ear: External ear normal.   Left Ear: External ear normal.   Eyes: Conjunctivae and EOM are normal. Pupils are equal, round, and reactive to light.   Neck: Normal range of motion.   Cardiovascular: Normal rate and regular rhythm.   Pulmonary/Chest: Effort normal and breath sounds normal.   Abdominal: Soft. Bowel sounds are normal. She exhibits no distension and no mass. There is no tenderness.  There is no rebound and no guarding. No hernia.   Musculoskeletal: Normal range of motion.   Neurological: She is alert and oriented to person, place, and time.   Skin: She is not diaphoretic.   Psychiatric: She has a normal mood and affect. Her behavior is normal.      Assessment:       No diagnosis found.    Laboratory Data:   Results for ALEXIS GORDON (MRN [id number]) as of 9/24/2018 20:58   Ref. Range 7/29/2018 16:00 8/14/2018 12:38 8/23/2018 10:42 8/23/2018 10:49 8/23/2018 12:24   WBC Latest Ref Range: 3.90 - 12.70 K/uL 4.77  4.30     RBC Latest Ref Range: 4.00 - 5.40 M/uL 3.84 (L)  3.44 (L)     Hemoglobin Latest Ref Range: 12.0 - 16.0 g/dL 10.5 (L)  9.3 (L)     Hematocrit Latest Ref Range: 37.0 - 48.5 % 33.4 (L)  29.4 (L)     MCV Latest Ref Range: 82 - 98 fL 87  86     MCH Latest Ref Range: 27.0 - 31.0 pg 27.3  27.0     MCHC Latest Ref Range: 32.0 - 36.0 g/dL 31.4 (L)  31.6 (L)     RDW Latest Ref Range: 11.5 - 14.5 % 13.8  14.3     Platelets Latest Ref Range: 150 - 350 K/uL 310  242     MPV Latest Ref Range: 9.2 - 12.9 fL 9.2  10.0     Gran% Latest Ref Range: 38.0 - 73.0 % 67.1  74.8 (H)     Gran # (ANC) Latest Ref Range: 1.8 - 7.7 K/uL 3.2  3.2     Lymph% Latest Ref Range: 18.0 - 48.0 % 21.0  15.3 (L)     Lymph # Latest Ref Range: 1.0 - 4.8 K/uL 1.0  0.7 (L)     Mono% Latest Ref Range: 4.0 - 15.0 % 11.1  8.8     Mono # Latest Ref Range: 0.3 - 1.0 K/uL 0.5  0.4     Eosinophil% Latest Ref Range: 0.0 - 8.0 % 0.4  0.7     Eos # Latest Ref Range: 0.0 - 0.5 K/uL 0.0  0.0     Basophil% Latest Ref Range: 0.0 - 1.9 % 0.4  0.2     Baso # Latest Ref Range: 0.00 - 0.20 K/uL 0.02  0.01     Iron Latest Ref Range: 30 - 160 ug/dL  28 (L)      TIBC Latest Ref Range: 250 - 450 ug/dL  326      Saturated Iron Latest Ref Range: 20 - 50 %  9 (L)      Transferrin Latest Ref Range: 200 - 375 mg/dL  220      Ferritin Latest Ref Range: 20.0 - 300.0 ng/mL  17 (L)      Sodium Latest Ref Range: 136 - 145 mmol/L 139 142 140      Potassium Latest Ref Range: 3.5 - 5.1 mmol/L 3.1 (L) 3.7 3.5     Chloride Latest Ref Range: 95 - 110 mmol/L 99 109 99     CO2 Latest Ref Range: 23 - 29 mmol/L 28 23 31 (H)     Anion Gap Latest Ref Range: 8 - 16 mmol/L 12 10 10     BUN, Bld Latest Ref Range: 8 - 23 mg/dL 18 (H) 17 5 (L)     Creatinine Latest Ref Range: 0.5 - 1.4 mg/dL 0.93 1.00 0.8     eGFR if non African American Latest Ref Range: >60 mL/min/1.73 m^2 >60.0 58.8 (A) >60     eGFR if African American Latest Ref Range: >60 mL/min/1.73 m^2 >60.0 >60.0 >60     Glucose Latest Ref Range: 70 - 110 mg/dL 96 118 (H) 105     Calcium Latest Ref Range: 8.7 - 10.5 mg/dL 9.5 8.5 (L) 9.3     Magnesium Latest Ref Range: 1.6 - 2.6 mg/dL  1.2 (L) 1.3 (L)     Alkaline Phosphatase Latest Ref Range: 55 - 135 U/L 88  93     Total Protein Latest Ref Range: 6.0 - 8.4 g/dL 7.5  7.0     Albumin Latest Ref Range: 3.5 - 5.2 g/dL 4.0  3.2 (L)     Total Bilirubin Latest Ref Range: 0.1 - 1.0 mg/dL 0.7  0.5     AST Latest Ref Range: 10 - 40 U/L 17  12     ALT Latest Ref Range: 10 - 44 U/L 6 (L)  6 (L)     Lipase Latest Ref Range: 4 - 60 U/L   8     Specimen UA Unknown    Urine, Clean Catch    Color, UA Latest Ref Range: Yellow, Straw, [place]al[place]     Yellow    pH, UA Latest Ref Range: 5.0 - 8.0     8.0    Specific Gravity, UA Latest Ref Range: 1.005 - 1.030     1.015    Appearance, UA Latest Ref Range: Clear     Clear    Protein, UA Latest Ref Range: Negative     Negative    Glucose, UA Latest Ref Range: Negative     Negative    Ketones, UA Latest Ref Range: Negative     Negative    Occult Blood UA Latest Ref Range: Negative     Trace (A)    Nitrite, UA Latest Ref Range: Negative     Negative    Urobilinogen, UA Latest Ref Range: <2.0 EU/dL    Negative    Bilirubin (UA) Latest Ref Range: Negative     Negative    Leukocytes, UA Latest Ref Range: Negative     Negative    CT ABDOMEN PELVIS WITHOUT CONTRAST Unknown     Rpt   Differential Method Unknown Automated  Automated     Results for  EVANALEXIS (MRN [id number]) as of 9/24/2018 20:58   Ref. Range 10/8/2014 00:00 4/8/2016 00:00   EXT 5 HIAA BLOOD Latest Ref Range: 0 - 22 ng/ml 105 (A)    EXT Albumin Latest Ref Range: 3.6 - 5.1 g/dl 4.2 4.3   EXT Alkaline Phosphatase Latest Ref Range: 33 - 130 u/l 85 68   EXT ALT Latest Ref Range: 6 - 29 u/l 5 (A) 5 (A)   EXT AST Latest Ref Range: 10 - 35 u/l 7 (A) 10   EXT BilirubiN Total Latest Ref Range: 0.2 - 1.2 mg/dl 0.3 0.3   EXT BUN Latest Ref Range: 7 - 25 mg/dl 21 16   EXT Calcium Latest Ref Range: 8.6 - 10.4 mg/dl 9.5 9.7   EXT Chloride Latest Ref Range: 98 - 110 mmol/l 106 105   EXT CHROMOGRANIN A Latest Ref Range: 1.9 - 15.0 ng/ml 48.0 (A)    EXT CO2 Latest Ref Range: 19 - 30 mmol/l 25 25   EXT Creatinine Latest Ref Range: 0.50 - 0.99 mg/dl 1.08 (A) 1.01 (A)   EXT Glucose Latest Ref Range: 65 - 99 mg/dl 124 (A) 118 (A)   EXT Hematocrit Latest Ref Range: 35.0 - 5.0 % 34.1 (A) 26.8 (A)   EXT Hemoglobin Latest Ref Range: 11.7 - 15.5 g/dl 10.8 (A) 8.0 (A)   EXT NEUROKININ A KAYDEN Latest Ref Range: 0 - 40 pg/ml 33    EXT PANCREASTATIN KAYDEN Latest Ref Range: 10 - 135 pg/ml 486 (A) 1,007 (A)   EXT Platelets Latest Ref Range: 140 - 400 1000/ul 279 344   EXT Potassium Latest Ref Range: 3.5 - 5.3 mmol/l 3.5 4.2   EXT Protein total Latest Ref Range: 6.1 - 8.1 g/dl 7.7 7.5   EXT Sodium Latest Ref Range: 135 - 146 mmol/l 141 142   EXT WBC Latest Ref Range: 3.8 - 10.8 1000/ul 4.2 6.2     CT Abdomen Pelvis  Without Contrast   Order: [id number]   Status:  Final result   Visible to patient:  No (Not Released) Next appt:  09/26/2018 at 01:30 PM in Urology (Y&#39;Denise Devries NP)   Details     Reading Physician Reading Date Result Priority   Renetta Snow MD 8/23/2018       Narrative     EXAMINATION:  CT ABDOMEN PELVIS WITHOUT CONTRAST    CLINICAL HISTORY:  abdominal pain;    TECHNIQUE:  Low dose axial images, sagittal and coronal reformations were obtained from the lung bases to the pubic symphysis.  Oral  contrast was not administered.    COMPARISON:  07/27/2018    FINDINGS:  The lung bases are clear.  The noncontrast appearance of the liver appears normal.  Cholecystectomy clips.  The stomach, pancreas, spleen, adrenal glands appear normal.  Bilateral   forming renal stones, the largest is at the lower pole of the right kidney, a staghorn calculus measuring about 3 cm in length.  No stones seen within the bilateral ureters or within the bladder.  There are punctate forming stones  also seen at the lower pole of the left kidney.  The aorta tapers normally.  Moderate stool retention.  Surgical bowel suture in the mid abdomen.  The soft tissue mass within the right mid abdomen region measuring 2.8 cm, unchanged.  Few upper normal nodes noted in the mesentery with minimal mesenteric stranding.  It is unchanged.  The bladder appears normal.  The uterus has been removed.  The ovaries have been removed.  The osseous structures demonstrate no osseous lesions.      Impression       Bilateral kidney calculi.    Mesenteric mass, unchanged.             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  She was in the process of getting a workup done she her condition was discussed at the tumor board and the plan was to get the studies done and discuss the plan however gallium scan could not be completed.    She continues to have diarrhea high.  Does not have any flushing of his 60.  She does not have a a family member to transport her to that facility to get scan and therefore has scans were canceled.    I had a long discussion with her about the importance of the scan and starting the right treatment  She is not onlanreotide regularly.  I emphasize that need forlanreotide treatment    And  since he still has a transportation issue will not plan for any more gallium will plan for OctreoScan an MRI and discussed at the tumor board will also get the carcinoid labs done  Plan:       Carcinoid labs  MRI  OctreoScan  Discussion of the tumor board  Orders placed                  AMY Perez MD, FACS   Associate Professor of Surgery, [facility]   Neuroendocrine Surgery, Hepatic/Pancreatic & General Surgery   [address], [address]   [place] [address]   ph. [phone]; [phone]   fax. [phone]

## 2018-09-26 ENCOUNTER — OUTPATIENT CASE MANAGEMENT (OUTPATIENT)
Dept: ADMINISTRATIVE | Facility: OTHER | Age: 68
End: 2018-09-26

## 2018-09-26 NOTE — PROGRESS NOTES
Summary:   left message reque sting return call    Interventions:  1st Attempt to complete follow-up for Outpatient Care Management;    Plan:  2nd attempt for f/u planned for 9/27

## 2018-09-27 ENCOUNTER — TELEPHONE (OUTPATIENT)
Dept: FAMILY MEDICINE | Facility: CLINIC | Age: 68
End: 2018-09-27

## 2018-09-27 NOTE — TELEPHONE ENCOUNTER
----- Message from Lilibeth Flowers sent at 9/27/2018 12:20 PM CDT -----  No. [phone]   Patient has colon cancer.  Her home health nurse told her to call the office and ask that something be called in for a runny nose.   [facility] in [place]

## 2018-09-28 ENCOUNTER — OUTPATIENT CASE MANAGEMENT (OUTPATIENT)
Dept: ADMINISTRATIVE | Facility: OTHER | Age: 68
End: 2018-09-28

## 2018-09-28 NOTE — PROGRESS NOTES
9/28  Summary:  Contacted patient by phone for follow up visit today. Patient states she has had a runny nose and got Zyrtec as recommended per PCP and it has helped a little. Patient states she had a low grade fever when she went to her MD last week but has not had any fever since. Home Health nurse continues to visit one time a week. Continued to educated on s/s infection ie. fever, chills, productive cough and patient voiced understanding/agreement. Patient states she is taking all medications as directed. Pt denies falls since our last visit and uses her walker for stability. We discussed fall prevention/home safety, ie. good lighting, clear pathways, no throw rugs and no electrical cords and patient voiced understanding/agreement. Encouraged patient/caregiver to communicate with physician and call this RN if having any questions/concerns. Reminded patient that she has appointments for testing next week on 10/4 and 10/5 and she is aware.       Interventions:  Continued to educate on Home Safety and Fall prevention and s/s of Infection. Encouraged patient to follow medication and treatment regimen. Encouraged patient to maintain follow up with doctors.        Plan:  This patient will be closed by the OPCM RN, as the nursing centered patient goals have been met       OPCM Self-Management Care Plan was reviewed with the patient. Goals were reviewed with the patient and the patient has agreed to work towards these goals to improve his/her overall well-being. Patient verbalized understanding of the care plan, goals, and all of today's instructions. Encouraged patient/caregiver to communicate with his/her physician and health care team about health conditions and the treatment plan.  Provided my contact information today and encouraged patient/caregiver to call me with any questions as needed.

## 2018-10-01 ENCOUNTER — CONFERENCE (OUTPATIENT)
Dept: NEUROLOGY | Facility: HOSPITAL | Age: 68
End: 2018-10-01

## 2018-10-01 NOTE — TELEPHONE ENCOUNTER
[facility]      NEUROENDOCRINE TUMOR MULTIDISCIPLINARY TUMOR BOARD  _____________________________________________________________________    PRESENTER:   SASHA Herbert MD    REASON FOR PRESENTATION:  Treatment Plan and Scan Review, MRI and O scan    ATTENDEES:   Surgery:              MD ENA Damico MD T. 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)        Past Surgical History:   Procedure Laterality Date    CATARACT EXTRACTION Left 10/2017    CHOLECYSTECTOMY      CHOLECYSTECTOMY N/A 12/30/2017    Performed by [facility] OR    COLON SURGERY      cystoscope      EYE SURGERY      HYSTERECTOMY  5/1996    INSERTION-INTRAOCULAR LENS (IOL) Left 10/4/2017    [facility] at [facility] OR    LITHOTRIPSY      LIVER BIOPSY  9/14    carcinoid    PHACOEMULSIFICATION-ASPIRATION-CATARACT Left 10/4/2017    Performed by [facility]mi Em [facility] at [facility] OR     ________________________________________________________________  Chief Complaint: Follow-up (reveiw ct scans and no gallium scan necessary per Dr Perez)     C/o lot of diarrhea     Having cold now with low grade temp  She was accompanied by her son  She was previously scheduled for MRI and gallium scan.  Patient did not show up. Even with isotope was ready the gallium scan was canceled and isotope was wasted.         DISCUSSION:  No recent MRI or Octreoscan available. Innumerable  hepatic mets which are SSA avid are more obvious than on prior MRI 12/30/17, but the technique is different.  Stable RLQ mass and also avid.  Can TACE      BOARD RECOMMENDATIONS:   Appointment with Dr Perez to discuss surgery

## 2018-10-03 ENCOUNTER — OFFICE VISIT (OUTPATIENT)
Dept: UROLOGY | Facility: CLINIC | Age: 68
End: 2018-10-03
Payer: MEDICARE

## 2018-10-03 ENCOUNTER — OUTPATIENT CASE MANAGEMENT (OUTPATIENT)
Dept: ADMINISTRATIVE | Facility: OTHER | Age: 68
End: 2018-10-03

## 2018-10-03 VITALS
WEIGHT: 152 LBS | RESPIRATION RATE: 17 BRPM | SYSTOLIC BLOOD PRESSURE: 96 MMHG | DIASTOLIC BLOOD PRESSURE: 52 MMHG | BODY MASS INDEX: 24.43 KG/M2 | HEART RATE: 73 BPM | OXYGEN SATURATION: 97 % | HEIGHT: 66 IN

## 2018-10-03 DIAGNOSIS — R10.9 RIGHT FLANK PAIN: ICD-10-CM

## 2018-10-03 DIAGNOSIS — N20.0 BILATERAL NEPHROLITHIASIS: Primary | ICD-10-CM

## 2018-10-03 LAB
BILIRUB SERPL-MCNC: NORMAL MG/DL
BLOOD URINE, POC: NORMAL
COLOR, POC UA: NORMAL
GLUCOSE UR QL STRIP: NORMAL
KETONES UR QL STRIP: NORMAL
LEUKOCYTE ESTERASE URINE, POC: NORMAL
NITRITE, POC UA: NORMAL
PH, POC UA: 5
PROTEIN, POC: 30
SPECIFIC GRAVITY, POC UA: 1.02
UROBILINOGEN, POC UA: 0.2

## 2018-10-03 PROCEDURE — 81002 URINALYSIS NONAUTO W/O SCOPE: CPT | Mod: PBBFAC,PO | Performed by: NURSE PRACTITIONER

## 2018-10-03 PROCEDURE — 99203 OFFICE O/P NEW LOW 30 MIN: CPT | Mod: S$PBB,,, | Performed by: NURSE PRACTITIONER

## 2018-10-03 PROCEDURE — 99999 PR PBB SHADOW E&M-EST. PATIENT-LVL V: CPT | Mod: PBBFAC,,, | Performed by: NURSE PRACTITIONER

## 2018-10-03 PROCEDURE — 87077 CULTURE AEROBIC IDENTIFY: CPT | Mod: 59

## 2018-10-03 PROCEDURE — 3078F DIAST BP <80 MM HG: CPT | Mod: CPTII,,, | Performed by: NURSE PRACTITIONER

## 2018-10-03 PROCEDURE — 87186 SC STD MICRODIL/AGAR DIL: CPT

## 2018-10-03 PROCEDURE — 87088 URINE BACTERIA CULTURE: CPT

## 2018-10-03 PROCEDURE — 99215 OFFICE O/P EST HI 40 MIN: CPT | Mod: PBBFAC,PO | Performed by: NURSE PRACTITIONER

## 2018-10-03 PROCEDURE — 3074F SYST BP LT 130 MM HG: CPT | Mod: CPTII,,, | Performed by: NURSE PRACTITIONER

## 2018-10-03 PROCEDURE — 1101F PT FALLS ASSESS-DOCD LE1/YR: CPT | Mod: CPTII,,, | Performed by: NURSE PRACTITIONER

## 2018-10-03 PROCEDURE — 87086 URINE CULTURE/COLONY COUNT: CPT

## 2018-10-03 RX ORDER — TAMSULOSIN HYDROCHLORIDE 0.4 MG/1
0.4 CAPSULE ORAL DAILY
Qty: 30 CAPSULE | Refills: 0 | Status: CANCELLED | OUTPATIENT
Start: 2018-10-03 | End: 2018-11-02

## 2018-10-03 NOTE — PATIENT INSTRUCTIONS
"1. Urine dipstick  2. Urine culture  3. Schedule left lithotripsy with Dr. Isbell. Patient would like to talk to "cancer doctor" first before scheduling procedure.   4. Follow-up pending urine culture results.   "

## 2018-10-03 NOTE — LETTER
October 3, 2018      Miriam Heath, DO  [address]  [address]   [facility] [address]           [place] [facility]  [address] [place] [address] [address]  [place] [address]  Phone: [phone]  Fax: [phone]          Patient: Patito Domingo   MR Number: [id number]   YOB: 1950   Date of Visit: 10/3/2018       Dear Dr. Miriam Heath:    Thank you for referring Patito Domingo to me for evaluation. Attached you will find relevant portions of my assessment and plan of care.    If you have questions, please do not hesitate to call me. I look forward to following Patito Domingo along with you.    Sincerely,    Lara Devries, NP    Enclosure  CC:  No Recipients    If you would like to receive this communication electronically, please contact [email or web address] or [phone] to request more information on [facility] Link access.    For providers and/or their staff who would like to refer a patient to [facility], please contact us through our one-stop-shop provider referral line, [facility], at [phone].    If you feel you have received this communication in error or would no longer like to receive these types of communications, please e-mail [email or web address]

## 2018-10-03 NOTE — PROGRESS NOTES
Summary:1st Attempt to complete SW follow-up for Outpatient Care Management; left message requesting return call.  LCSW will reattempt at a later date.      Interventions:Left message requesting return call.     Plan:Follow up next week to clarify transportation resources. [employer] and [employer]

## 2018-10-03 NOTE — PROGRESS NOTES
Subjective:       Patient ID: Patito Domingo is a 68 y.o. female.    Chief Complaint: Nephrolithiasis    Patient is new to me. She is a 67 yo AAF who is here today for non-obstructing bilateral kidney stones evaluation. She was referred by her PCP (Dr. Hernandez). Patient's medical hx includes mesentery tumor, neuroendocrine carcinoma, secondary neuroendocrine tumor of liver. Patient reports she had colon cancer surgery approximately 1 year ago and received chemo treatments. She reports she still see Dr. Sánchez once a month for an injection, but not sure what kind of injection. Patient is not a good source for medical information and appears a little confused/unsure when discussing medical history. According to patient's record she is getting infusion treatment (Lanreotide).      Flank Pain   This is a chronic problem. The current episode started 1 to 4 weeks ago. The problem occurs intermittently. The problem has been waxing and waning since onset. The pain is present in the costovertebral angle (right). The quality of the pain is described as aching. The pain does not radiate. The pain is at a severity of 7/10. The pain is moderate. Pertinent negatives include no abdominal pain, bladder incontinence, bowel incontinence, dysuria, fever, numbness, pelvic pain, tingling or weakness. Risk factors include history of cancer and renal stones. She has tried nothing for the symptoms.     Review of Systems   Constitutional: Negative for fever.   Gastrointestinal: Negative for abdominal pain and bowel incontinence.   Genitourinary: Positive for flank pain. Negative for bladder incontinence, dysuria and pelvic pain.   Neurological: Negative for tingling, weakness and numbness.       Objective:      Physical Exam   Constitutional: She is oriented to person, place, and time. She appears well-developed and well-nourished.   HENT:   Head: Normocephalic and atraumatic.   Eyes: EOM are normal. Pupils are equal, round, and  "reactive to light.   Neck: Normal range of motion.   Cardiovascular: Normal rate.   Pulmonary/Chest: Effort normal. No respiratory distress.   Abdominal: Soft. There is no tenderness.   Musculoskeletal: Normal range of motion. She exhibits no edema.   Right CVAT   Neurological: She is alert and oriented to person, place, and time. Coordination normal.   Skin: Skin is warm and dry.   Psychiatric: She has a normal mood and affect. Her behavior is normal. Judgment and thought content normal.   Nursing note and vitals reviewed.      Assessment:       1. Bilateral nephrolithiasis    2. Right flank pain        Plan:       Patito was seen today for nephrolithiasis.    Diagnoses and all orders for this visit:    Bilateral nephrolithiasis  -     POCT URINE DIPSTICK WITHOUT MICROSCOPE  -     Urine culture    Right flank pain  -     POCT URINE DIPSTICK WITHOUT MICROSCOPE  -     Urine culture    Other orders  1. Schedule left lithotripsy with Dr. Isbell. Patient would like to talk to "cancer doctor" first before scheduling procedure.     Patient's case discussed with Dr. Isbell.     Follow-up pending urine culture results.     Lara Devries NP    "

## 2018-10-04 ENCOUNTER — HOSPITAL ENCOUNTER (OUTPATIENT)
Dept: RADIOLOGY | Facility: HOSPITAL | Age: 68
Discharge: HOME | End: 2018-10-04
Attending: SURGERY
Payer: MEDICARE

## 2018-10-04 DIAGNOSIS — C7B.00 METASTATIC CARCINOID TUMOR: ICD-10-CM

## 2018-10-04 DIAGNOSIS — R19.7 DIARRHEA, UNSPECIFIED TYPE: ICD-10-CM

## 2018-10-04 PROCEDURE — 74183 MRI ABD W/O CNTR FLWD CNTR: CPT | Mod: 26,,, | Performed by: RADIOLOGY

## 2018-10-04 PROCEDURE — 78803 RP LOCLZJ TUM SPECT 1 AREA: CPT | Mod: 26,,, | Performed by: RADIOLOGY

## 2018-10-04 PROCEDURE — 74183 MRI ABD W/O CNTR FLWD CNTR: CPT | Mod: TC

## 2018-10-04 PROCEDURE — 78999 UNLISTED MISC PX DX NUC MED: CPT | Mod: 26,,, | Performed by: RADIOLOGY

## 2018-10-04 PROCEDURE — A9585 GADOBUTROL INJECTION: HCPCS | Performed by: SURGERY

## 2018-10-04 PROCEDURE — 78803 RP LOCLZJ TUM SPECT 1 AREA: CPT | Mod: TC

## 2018-10-04 PROCEDURE — A9572 INDIUM IN-111 PENTETREOTIDE: HCPCS

## 2018-10-04 PROCEDURE — 78804 RP LOCLZJ TUM WHBDY 2+D IMG: CPT | Mod: 26,,, | Performed by: RADIOLOGY

## 2018-10-04 PROCEDURE — 25500020 PHARM REV CODE 255: Performed by: SURGERY

## 2018-10-04 RX ORDER — GADOBUTROL 604.72 MG/ML
10 INJECTION INTRAVENOUS
Status: COMPLETED | OUTPATIENT
Start: 2018-10-04 | End: 2018-10-04

## 2018-10-04 RX ADMIN — GADOBUTROL 10 ML: 604.72 INJECTION INTRAVENOUS at 09:10

## 2018-10-05 ENCOUNTER — HOSPITAL ENCOUNTER (OUTPATIENT)
Dept: RADIOLOGY | Facility: HOSPITAL | Age: 68
Discharge: HOME | End: 2018-10-05
Attending: SURGERY
Payer: MEDICARE

## 2018-10-05 DIAGNOSIS — C7B.00 METASTATIC CARCINOID TUMOR: ICD-10-CM

## 2018-10-05 DIAGNOSIS — R19.7 DIARRHEA, UNSPECIFIED TYPE: ICD-10-CM

## 2018-10-05 LAB — BACTERIA UR CULT: NORMAL

## 2018-10-05 PROCEDURE — 78803 RP LOCLZJ TUM SPECT 1 AREA: CPT | Mod: TC

## 2018-10-05 PROCEDURE — 78803 RP LOCLZJ TUM SPECT 1 AREA: CPT | Mod: 26,,, | Performed by: RADIOLOGY

## 2018-10-08 ENCOUNTER — OUTPATIENT CASE MANAGEMENT (OUTPATIENT)
Dept: ADMINISTRATIVE | Facility: OTHER | Age: 68
End: 2018-10-08

## 2018-10-08 DIAGNOSIS — N30.01 ACUTE CYSTITIS WITH HEMATURIA: Primary | ICD-10-CM

## 2018-10-08 RX ORDER — NITROFURANTOIN 25; 75 MG/1; MG/1
100 CAPSULE ORAL 2 TIMES DAILY
Qty: 20 CAPSULE | Refills: 0 | Status: SHIPPED | OUTPATIENT
Start: 2018-10-08 | End: 2018-10-18

## 2018-10-08 NOTE — PROGRESS NOTES
Summary:Phoned patient. Patient stated that her pain has been better. She stated that she had been called about surgery for the kidney stones but wanted to have clearance from her oncologist and PCP. She said that her oldest son took off work to have the tests last week and she is awaiting the results of the tests. She stated that she anticipates the results this week.   Patient stated that she has resolved the transportation issues. She is utilizing [employer] transportation. She asked if she could have someone ride with her due to her weakness, they did not allow her to have an attendant but the  has been helpful to her. She stated that she has received her rollator which has been helpful.   Explored support system, youngest son and daughter in law are working long hours and cannot assist her with getting her prescriptions. Patient continues to attempt to provide her own care.   Patient stated that she will call with her results of her tests if she receives them prior to LCSW calling her.     Interventions:Provided supportive counseling. Provided transportation resource information.     Plan:Follow up next week.     Todays OPCM Self-Management Care Plan was developed with the patients/caregivers input and was based on identified barriers from todays assessment.  Goals were written today with the patient/caregiver and the patient has agreed to work towards these goals to improve his/her overall well-being. Patient verbalized understanding of the care plan, goals, and all of today's instructions. Encouraged patient/caregiver to communicate with his/her physician and health care team about health conditions and the treatment plan.  Provided my contact information today and encouraged patient/caregiver to call me with any questions as needed.

## 2018-10-11 NOTE — TELEPHONE ENCOUNTER
Spoke with patient and advised her of Tumor Board Recommendations. Patient will keep her appointment to see Dr Perez on Thurs 10/18/18.

## 2018-10-12 ENCOUNTER — CLINICAL SUPPORT (OUTPATIENT)
Dept: FAMILY MEDICINE | Facility: CLINIC | Age: 68
End: 2018-10-12
Payer: MEDICARE

## 2018-10-12 DIAGNOSIS — Z23 FLU VACCINE NEED: Primary | ICD-10-CM

## 2018-10-12 PROCEDURE — 90662 IIV NO PRSV INCREASED AG IM: CPT | Mod: PBBFAC,PN

## 2018-10-12 PROCEDURE — G0008 ADMIN INFLUENZA VIRUS VAC: HCPCS | Mod: PBBFAC

## 2018-10-15 ENCOUNTER — OUTPATIENT CASE MANAGEMENT (OUTPATIENT)
Dept: ADMINISTRATIVE | Facility: OTHER | Age: 68
End: 2018-10-15

## 2018-10-15 DIAGNOSIS — B37.9 ANTIBIOTIC-INDUCED YEAST INFECTION: Primary | ICD-10-CM

## 2018-10-15 DIAGNOSIS — R11.0 NAUSEA IN ADULT: ICD-10-CM

## 2018-10-15 DIAGNOSIS — T36.95XA ANTIBIOTIC-INDUCED YEAST INFECTION: Primary | ICD-10-CM

## 2018-10-15 NOTE — PROGRESS NOTES
Summary:1st Attempt to complete SW follow-up for Outpatient Care Management; left message requesting return call.  LCSW will reattempt at a later date.      Interventions:Left message requesting return call.     Plan:Follow up next week.

## 2018-10-16 ENCOUNTER — TELEPHONE (OUTPATIENT)
Dept: ADMINISTRATIVE | Facility: CLINIC | Age: 68
End: 2018-10-16

## 2018-10-16 RX ORDER — FLUCONAZOLE 150 MG/1
150 TABLET ORAL DAILY
Qty: 1 TABLET | Refills: 0 | Status: SHIPPED | OUTPATIENT
Start: 2018-10-16 | End: 2018-10-17

## 2018-10-16 RX ORDER — PROMETHAZINE HYDROCHLORIDE 25 MG/1
25 TABLET ORAL EVERY 6 HOURS PRN
Qty: 30 TABLET | Refills: 0 | OUTPATIENT
Start: 2018-10-16

## 2018-10-16 NOTE — TELEPHONE ENCOUNTER
Patient states she would like to have a prescription for Diflucan for yeast infection. Patient states she gets the yeast infection when she is on antibiotics long term. Patient has recently completed antibiotic.

## 2018-10-16 NOTE — TELEPHONE ENCOUNTER
----- Message from Albertina Wallis sent at 10/16/2018 10:40 AM CDT -----  Contact: self / [phone]  Patient is requesting a call back regarding, she has a yeast infection and needs to be see this week. Please advise

## 2018-10-16 NOTE — TELEPHONE ENCOUNTER
Home Health Recert 08/31/2018 - 10/29/2018 with [facility] - Dr. Magdalena Hernandez. Patient received  services.

## 2018-10-18 ENCOUNTER — OFFICE VISIT (OUTPATIENT)
Dept: NEUROLOGY | Facility: HOSPITAL | Age: 68
End: 2018-10-18
Attending: SURGERY
Payer: MEDICARE

## 2018-10-18 VITALS
WEIGHT: 160.19 LBS | HEIGHT: 66 IN | RESPIRATION RATE: 20 BRPM | SYSTOLIC BLOOD PRESSURE: 168 MMHG | TEMPERATURE: 99 F | HEART RATE: 68 BPM | BODY MASS INDEX: 25.74 KG/M2 | DIASTOLIC BLOOD PRESSURE: 76 MMHG

## 2018-10-18 DIAGNOSIS — C78.7 SECONDARY MALIGNANT NEOPLASM OF LIVER: ICD-10-CM

## 2018-10-18 DIAGNOSIS — C7B.00 METASTATIC CARCINOID TUMOR: Primary | ICD-10-CM

## 2018-10-18 LAB
EXT 24 HR UR METANEPHRINE: ABNORMAL
EXT 24 HR UR NORMETANEPHRINE: ABNORMAL
EXT 24 HR UR NORMETANEPHRINE: ABNORMAL
EXT 25 HYDROXY VIT D2: ABNORMAL
EXT 25 HYDROXY VIT D3: ABNORMAL
EXT 5 HIAA 24 HR URINE: ABNORMAL
EXT 5 HIAA BLOOD: 294 NG/ML (ref 0–22)
EXT ACTH: ABNORMAL
EXT AFP: ABNORMAL
EXT ALBUMIN: 3.8 G/DL (ref 3.6–5.1)
EXT ALKALINE PHOSPHATASE: 79 U/L (ref 33–130)
EXT ALT: 8 U/L (ref 6–29)
EXT AMYLASE: ABNORMAL
EXT ANTI ISLET CELL AB: ABNORMAL
EXT ANTI PARIETAL CELL AB: ABNORMAL
EXT ANTI THYROID AB: ABNORMAL
EXT AST: 14 U/L (ref 10–35)
EXT BILIRUBIN DIRECT: ABNORMAL MG/DL
EXT BILIRUBIN TOTAL: 0.5 MG/DL (ref 0.2–1.2)
EXT BK VIRUS DNA QN PCR: ABNORMAL
EXT BUN: 13 MG/DL (ref 7–25)
EXT C PEPTIDE: ABNORMAL
EXT CA 125: ABNORMAL
EXT CA 19-9: ABNORMAL
EXT CA 27-29: ABNORMAL
EXT CALCITONIN: ABNORMAL
EXT CALCIUM: 9.1 MG/DL (ref 8.6–10.4)
EXT CEA: ABNORMAL
EXT CHLORIDE: 102 MMOL/L (ref 98–110)
EXT CHOLESTEROL: ABNORMAL
EXT CHROMOGRANIN A: 313 NG/ML (ref 25–140)
EXT CO2: 30 MMOL/L (ref 20–32)
EXT CREATININE UA: ABNORMAL
EXT CREATININE: 0.85 MG/DL (ref 0.5–0.99)
EXT CYCLOSPORONE LEVEL: ABNORMAL
EXT DOPAMINE: ABNORMAL
EXT EBV DNA BY PCR: ABNORMAL
EXT EPINEPHRINE: ABNORMAL
EXT FOLATE: ABNORMAL
EXT FREE T3: ABNORMAL
EXT FREE T4: ABNORMAL
EXT FSH: ABNORMAL
EXT GASTRIN RELEASING PEPTIDE: ABNORMAL
EXT GASTRIN RELEASING PEPTIDE: ABNORMAL
EXT GASTRIN: ABNORMAL
EXT GGT: ABNORMAL
EXT GHRELIN: ABNORMAL
EXT GLUCAGON: ABNORMAL
EXT GLUCOSE: 95 MG/DL (ref 65–99)
EXT GROWTH HORMONE: ABNORMAL
EXT HCV RNA QUANT PCR: ABNORMAL
EXT HDL: ABNORMAL
EXT HEMATOCRIT: 26.5 % (ref 35–45)
EXT HEMOGLOBIN A1C: ABNORMAL
EXT HEMOGLOBIN: 8.3 G/DL (ref 11.7–15.5)
EXT HISTAMINE 24 HR URINE: ABNORMAL
EXT HISTAMINE: ABNORMAL
EXT IGF-1: ABNORMAL
EXT IMMUNKNOW (NON-STIMULATED): ABNORMAL
EXT IMMUNKNOW (STIMULATED): ABNORMAL
EXT INR: ABNORMAL
EXT INSULIN: ABNORMAL
EXT LANREOTIDE LEVEL: ABNORMAL
EXT LDH, TOTAL: ABNORMAL
EXT LDL CHOLESTEROL: ABNORMAL
EXT LIPASE: ABNORMAL
EXT MAGNESIUM: ABNORMAL
EXT METANEPHRINE FREE PLASMA: ABNORMAL
EXT MOTILIN: ABNORMAL
EXT NEUROKININ A CAMB: ABNORMAL
EXT NEUROKININ A ISI: 21 PG/ML (ref 0–40)
EXT NEUROTENSIN: ABNORMAL
EXT NOREPINEPHRINE: ABNORMAL
EXT NORMETANEPHRINE: ABNORMAL
EXT NSE: ABNORMAL
EXT OCTREOTIDE LEVEL: ABNORMAL
EXT PANCREASTATIN CAMB: ABNORMAL
EXT PANCREASTATIN ISI: 2724 PG/ML (ref 10–135)
EXT PANCREATIC POLYPEPTIDE: ABNORMAL
EXT PHOSPHORUS: ABNORMAL
EXT PLATELETS: 258 1000/UL (ref 140–400)
EXT POTASSIUM: 4.3 MMOL/L (ref 3.5–5.3)
EXT PROGRAF LEVEL: ABNORMAL
EXT PROLACTIN: ABNORMAL
EXT PROTEIN TOTAL: 6.9 G/DL (ref 6.1–8.1)
EXT PROTEIN UA: ABNORMAL
EXT PT: ABNORMAL
EXT PTH, INTACT: ABNORMAL
EXT PTT: ABNORMAL
EXT RAPAMUNE LEVEL: ABNORMAL
EXT SEROTONIN: 1909 NG/ML (ref 56–244)
EXT SODIUM: 139 MMOL/L (ref 135–146)
EXT SOMATOSTATIN: ABNORMAL
EXT SUBSTANCE P: ABNORMAL
EXT TRIGLYCERIDES: ABNORMAL
EXT TRYPTASE: ABNORMAL
EXT TSH: ABNORMAL
EXT URIC ACID: ABNORMAL
EXT URINE AMYLASE U/HR: ABNORMAL
EXT URINE AMYLASE U/L: ABNORMAL
EXT VASOACTIVE INTESTINAL POLYPEPTIDE: ABNORMAL
EXT VITAMIN B12: ABNORMAL
EXT VMA 24 HR URINE: ABNORMAL
EXT WBC: 4.5 1000/UL (ref 3.8–10.8)
NEURON SPECIFIC ENOLASE: ABNORMAL

## 2018-10-18 PROCEDURE — 99214 OFFICE O/P EST MOD 30 MIN: CPT | Performed by: SURGERY

## 2018-10-18 RX ORDER — LANREOTIDE ACETATE 120 MG/.5ML
120 INJECTION SUBCUTANEOUS
COMMUNITY
End: 2019-05-22

## 2018-10-18 NOTE — PROGRESS NOTES
"NOLANETS:  [facility]  A collaboration between [facility] and [facility]      PATIENT: Patito Domingo  MRN: [id number]  DATE: 10/18/2018    Subjective:      Chief Complaint: Follow-up (tests)   She has completed the test that we recommended this time.  She is feeling okay overall she has gained about 6 lb.  She continues to have diarrhea intermittent.  3/5 high 7 days she has no diarrhea for about of 7 days she has diarrhea ranging from few episodes to multiple episodes.  Overall she feels better.    She was accompanied by several of her family members.    Vitals:   Vitals:    10/18/18 1258   BP: (!) 168/76   BP Location: Right leg   Pulse: 68   Resp: 20   Temp: 98.7 °F (37.1 °C)   TempSrc: Oral   Weight: 72.7 kg (160 lb 2.6 oz)   Height: 5' 6" (1.676 m)        Karnofsky Score:   Karnofsky Score    Karnofsky Score:  80% - Normal Activity with Effort: Some Symptoms of Disease         Diagnosis: No diagnosis found.     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)        Past Surgical History:  Past Surgical History:   Procedure Laterality Date    CATARACT EXTRACTION Left 10/2017    CHOLECYSTECTOMY      CHOLECYSTECTOMY N/A 12/30/2017    [facility] by [facility] at [facility] OR    COLON SURGERY      cystoscope      EYE SURGERY      HYSTERECTOMY  5/1996    INSERTION-INTRAOCULAR LENS (IOL) Left 10/4/2017    [facility]    LITHOTRIPSY      LIVER BIOPSY  9/14    carcinoid    PHACOEMULSIFICATION-ASPIRATION-CATARACT Left 10/4/2017    [facility] OR       Family History:  Family " History   Problem Relation Age of Onset    Cancer Mother         unknown    Alzheimer's disease Father     Stroke Sister     No Known Problems Son     No Known Problems Son     No Known Problems Son     No Known Problems Son     Kidney disease Neg Hx        Allergies:  Review of patient's allergies indicates:   Allergen Reactions    Epinephrine Anaphylaxis     Can cause  a Carcinoid Crisis    Contrast media Hives, Itching and Swelling    Ibuprofen Hives, Itching and Swelling    Iodinated contrast- oral and iv dye     Sulfa (sulfonamide antibiotics) Hives, Itching and Swelling       Medications:  Current Outpatient Medications   Medication Sig Dispense Refill    diphenoxylate-atropine 2.5-0.025 mg (LOMOTIL) 2.5-0.025 mg per tablet TAKE 1 TABLET BY MOUTH 4 TIMES A DAY AS NEEDED FOR DIARRHEA 60 tablet 2    ferrous gluconate 324 mg (36 mg iron) Tab Take 1 tablet by mouth once daily.      gabapentin (NEURONTIN) 100 MG capsule takes one capsule  capsule 6    lanreotide (SOMATULINE DEPOT) 120 mg/0.5 mL Syrg Inject 120 mg into the skin every 28 days.      losartan (COZAAR) 50 MG tablet TAKE 1 TABLET BY MOUTH DAILY 30 tablet 2    magnesium oxide (MAG-OX) 400 mg tablet Take 1 tablet (400 mg total) by mouth once daily. 30 tablet 2    metoprolol tartrate (LOPRESSOR) 50 MG tablet Take 1 tablet (50 mg total) by mouth 2 (two) times daily. 180 tablet 0    nitrofurantoin, macrocrystal-monohydrate, (MACROBID) 100 MG capsule Take 1 capsule (100 mg total) by mouth 2 (two) times daily. for 10 days (Patient taking differently: Take 100 mg by mouth 2 (two) times daily as needed. ) 20 capsule 0    nystatin (MYCOSTATIN) 100,000 unit/mL suspension SWISH AND SWALLOW 1 TEASPOON BY MOUTH 4 TIMES A DAY. ONCE RELIEVED CONTINUE FOR 48 HOURS (Patient taking differently: as needed. SWISH AND SWALLOW 1 TEASPOON BY MOUTH 4 TIMES A DAY. ONCE RELIEVED CONTINUE FOR 48 HOURS) 60 Bottle 0    oxyCODONE (ROXICODONE) 30 MG Tab  Take 30 mg by mouth every 4 (four) hours as needed.       promethazine (PHENERGAN) 25 MG tablet TAKE 1 TABLET (25 MG TOTAL) BY MOUTH EVERY 6 (SIX) HOURS AS NEEDED FOR NAUSEA. 30 tablet 0    walker (ULTRA-LIGHT ROLLATOR) Misc 1 Units by Misc.(Non-Drug; Combo Route) route once daily. 1 each 0    bumetanide (BUMEX) 0.5 MG Tab Take 1 tablet (0.5 mg total) by mouth once daily. (Patient taking differently: Take 0.5 mg by mouth daily as needed. ) 30 tablet 2    triamcinolone acetonide 0.1% (KENALOG) 0.1 % ointment Apply topically 2 (two) times daily. for 10 days 30 g 1     No current facility-administered medications for this visit.        Review of Systems   Constitutional: Positive for fatigue. Negative for activity change, appetite change, fever and unexpected weight change.   HENT: Negative for congestion, drooling, ear pain, facial swelling, hearing loss, mouth sores, nosebleeds, postnasal drip, rhinorrhea, sinus pain, sneezing, sore throat and tinnitus.    Eyes: Negative for photophobia, discharge, redness, itching and visual disturbance.   Respiratory: Negative for apnea, cough, choking, chest tightness, shortness of breath, wheezing and stridor.    Cardiovascular: Negative for palpitations and leg swelling.   Gastrointestinal: Positive for diarrhea, nausea and vomiting. Negative for abdominal distention, abdominal pain, blood in stool, constipation and rectal pain.   Endocrine: Negative.    Genitourinary: Negative.    Musculoskeletal: Positive for arthralgias, back pain, gait problem and joint swelling.   Skin: Negative.    Allergic/Immunologic: Negative.    Neurological: Positive for headaches. Negative for dizziness, syncope, facial asymmetry and speech difficulty.   Hematological: Negative.    Psychiatric/Behavioral: Negative.       Objective:      Physical Exam   Constitutional: She is oriented to person, place, and time. She appears well-developed and well-nourished. No distress.   HENT:   Head:  Normocephalic and atraumatic.   Right Ear: External ear normal.   Left Ear: External ear normal.   Eyes: Conjunctivae and EOM are normal. Pupils are equal, round, and reactive to light.   Neck: Normal range of motion.   Cardiovascular: Normal rate and regular rhythm.   Pulmonary/Chest: Effort normal and breath sounds normal.   Abdominal: Soft. Bowel sounds are normal.   Midline and right upper quadrant incision intact   Musculoskeletal: Normal range of motion.   Neurological: She is alert and oriented to person, place, and time.   Skin: Skin is warm. She is not diaphoretic.   Psychiatric: She has a normal mood and affect. Her behavior is normal.      Assessment:       No diagnosis found.    Laboratory Data:   10/9/2018       Narrative     EXAMINATION:  NM TUMOR LOC OCTREO SCAN W/ SPECT/FUSION-MULTI DAY; NM TUMOR LOCALIZATION SPECT    CLINICAL HISTORY:  Secondary carcinoid tumors, unspecified site    TECHNIQUE:  Following intravenous administration of 6 mCi of 111In-octreotide, delayed whole body images were acquired at 4 and 24 hours.  SPECT CT images were also acquired the abdomen at 4 hours and of the chest, abdomen and pelvis at 24 hours.    COMPARISON:  MRI abdomen 10/04/2018, octreotide scan 10/31/2014    FINDINGS:  Multiple sites of uptake within the liver, as well as within a partially calcified right lower quadrant mass.    Physiologic uptake in the spleen, kidneys, urinary bladder, and colon.  There is asymmetric, left greater than right uptake in the thyroid, also likely physiologic.  Gallbladder surgically absent.      Impression       Somatostatin avid right lower quadrant mass with multiple hepatic metastases.  Findings correlate well with comparison MRI abdomen.      Electronically signed by: Jeremiah Young  Date: 10/09/2018  Time: 16:41             Last Resulted: 10/09/18 16:41 Order Details View Encounter Lab and Collection Details Routing Result History            External Result Report     External  Result Report   Narrative     EXAMINATION:  NM TUMOR LOC OCTREO SCAN W/ SPECT/FUSION-MULTI DAY; NM TUMOR LOCALIZATION SPECT    CLINICAL HISTORY:  Secondary carcinoid tumors, unspecified site    TECHNIQUE:  Following intravenous administration of 6 mCi of 111In-octreotide, delayed whole body images were acquired at 4 and 24 hours.  SPECT CT images were also acquired the abdomen at 4 hours and of the chest, abdomen and pelvis at 24 hours.    COMPARISON:  MRI abdomen 10/04/2018, octreotide scan 10/31/2014    FINDINGS:  Multiple sites of uptake within the liver, as well as within a partially calcified right lower quadrant mass.    Physiologic uptake in the spleen, kidneys, urinary bladder, and colon.  There is asymmetric, left greater than right uptake in the thyroid, also likely physiologic.  Gallbladder surgically absent.   Impression       Somatostatin avid right lower quadrant mass with multiple hepatic metastases.  Findings correlate well with comparison MRI abdomen.      Electronically signed by: Jeremiah Young  Date: 10/09/2018  Time: 16:41        Results for EVAN ALEXIS COLLADO (MRN [id number]) as of 10/18/2018 18:31   Ref. Range 8/14/2018 12:38 8/23/2018 10:42 8/23/2018 10:49 10/3/2018 14:33   RBC Latest Ref Range: 4.00 - 5.40 M/uL  3.44 (L)     Hemoglobin Latest Ref Range: 12.0 - 16.0 g/dL  9.3 (L)     Hematocrit Latest Ref Range: 37.0 - 48.5 %  29.4 (L)     MCV Latest Ref Range: 82 - 98 fL  86     MCH Latest Ref Range: 27.0 - 31.0 pg  27.0     MCHC Latest Ref Range: 32.0 - 36.0 g/dL  31.6 (L)     RDW Latest Ref Range: 11.5 - 14.5 %  14.3     Platelets Latest Ref Range: 150 - 350 K/uL  242     MPV Latest Ref Range: 9.2 - 12.9 fL  10.0     Gran% Latest Ref Range: 38.0 - 73.0 %  74.8 (H)     Gran # (ANC) Latest Ref Range: 1.8 - 7.7 K/uL  3.2     Lymph% Latest Ref Range: 18.0 - 48.0 %  15.3 (L)     Lymph # Latest Ref Range: 1.0 - 4.8 K/uL  0.7 (L)     Mono% Latest Ref Range: 4.0 - 15.0 %  8.8     Mono # Latest Ref  Range: 0.3 - 1.0 K/uL  0.4     Eosinophil% Latest Ref Range: 0.0 - 8.0 %  0.7     Eos # Latest Ref Range: 0.0 - 0.5 K/uL  0.0     Basophil% Latest Ref Range: 0.0 - 1.9 %  0.2     Baso # Latest Ref Range: 0.00 - 0.20 K/uL  0.01     Iron Latest Ref Range: 30 - 160 ug/dL 28 (L)      TIBC Latest Ref Range: 250 - 450 ug/dL 326      Saturated Iron Latest Ref Range: 20 - 50 % 9 (L)      Transferrin Latest Ref Range: 200 - 375 mg/dL 220      Ferritin Latest Ref Range: 20.0 - 300.0 ng/mL 17 (L)      Sodium Latest Ref Range: 136 - 145 mmol/L 142 140     Potassium Latest Ref Range: 3.5 - 5.1 mmol/L 3.7 3.5     Chloride Latest Ref Range: 95 - 110 mmol/L 109 99     CO2 Latest Ref Range: 23 - 29 mmol/L 23 31 (H)     Anion Gap Latest Ref Range: 8 - 16 mmol/L 10 10     BUN, Bld Latest Ref Range: 8 - 23 mg/dL 17 5 (L)     Creatinine Latest Ref Range: 0.5 - 1.4 mg/dL 1.00 0.8     eGFR if non African American Latest Ref Range: >60 mL/min/1.73 m^2 58.8 (A) >60     eGFR if African American Latest Ref Range: >60 mL/min/1.73 m^2 >60.0 >60     Glucose Latest Ref Range: 70 - 110 mg/dL 118 (H) 105     Calcium Latest Ref Range: 8.7 - 10.5 mg/dL 8.5 (L) 9.3     Magnesium Latest Ref Range: 1.6 - 2.6 mg/dL 1.2 (L) 1.3 (L)     Alkaline Phosphatase Latest Ref Range: 55 - 135 U/L  93     Total Protein Latest Ref Range: 6.0 - 8.4 g/dL  7.0     Albumin Latest Ref Range: 3.5 - 5.2 g/dL  3.2 (L)     Total Bilirubin Latest Ref Range: 0.1 - 1.0 mg/dL  0.5     AST Latest Ref Range: 10 - 40 U/L  12     ALT Latest Ref Range: 10 - 44 U/L  6 (L)     Lipase Latest Ref Range: 4 - 60 U/L  8     Specimen UA Unknown   Urine, Clean Catch    Color, UA Latest Ref Range: Yellow, Straw, Rosalva    Yellow    pH, UA Latest Ref Range: 5.0 - 8.0    8.0    Specific Gravity, UA Latest Ref Range: 1.005 - 1.030    1.015    Appearance, UA Latest Ref Range: Clear    Clear    Protein, UA Latest Ref Range: Negative    Negative    Glucose, UA Unknown    -   Glucose, UA Latest Ref  Range: Negative    Negative    Ketones, UA Unknown   Negative trace   Occult Blood UA Latest Ref Range: Negative    Trace (A)    Nitrite, UA Latest Ref Range: Negative    Negative    Urobilinogen, UA Latest Ref Range: <2.0 EU/dL   Negative    Bilirubin (UA) Latest Ref Range: Negative    Negative    Leukocytes, UA Latest Ref Range: Negative    Negative    RBC, UA Unknown    trace   WBC, UA Unknown    moderate   CULTURE, URINE Unknown    Rpt   Urine Culture, Routine Unknown    ESCHERICHIA COLI...   Bilirubin Unknown    -   Protein Unknown    30   Nitrite, UA Unknown    +   Urobilinogen, UA Unknown    0.2   Spec Grav UA Unknown    1.020   Color, UA Unknown    brown   pH, UA Unknown    5.0                         []Hide copied text    []Hover for details      [facility]                                                NEUROENDOCRINE TUMOR MULTIDISCIPLINARY TUMOR BOARD  _____________________________________________________________________     PRESENTER:   SASHA Herbert MD     REASON FOR PRESENTATION:  Treatment Plan and Scan Review, MRI and O scan     ATTENDEES:   Surgery:              MD ENA Damico MD T. 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)                 Past Surgical History:   Procedure Laterality Date    CATARACT EXTRACTION Left 10/2017    CHOLECYSTECTOMY        CHOLECYSTECTOMY N/A 12/30/2017     Performed by [facility] at [facility]  OR    COLON SURGERY        cystoscope        EYE SURGERY        HYSTERECTOMY   5/1996    INSERTION-INTRAOCULAR LENS (IOL) Left 10/4/2017     Performed by [facility]mi Em [facility] at [facility] OR    LITHOTRIPSY        LIVER BIOPSY   9/14     carcinoid    PHACOEMULSIFICATION-ASPIRATION-CATARACT Left 10/4/2017     Performed by Delmi Em MD at [facility] OR      ________________________________________________________________  Chief Complaint: Follow-up (reveiw ct scans and no gallium scan necessary per Dr Perez)     C/o lot of diarrhea     Having cold now with low grade temp  She was accompanied by her son  She was previously scheduled for MRI and gallium scan.  Patient did not show up. Even with isotope was ready the gallium scan was canceled and isotope was wasted.           DISCUSSION:  No recent MRI or Octreoscan available. Innumerable hepatic mets which are SSA avid are more obvious than on prior MRI 12/30/17, but the technique is different.  Stable RLQ mass and also avid.  Can TACE        BOARD RECOMMENDATIONS:   Appointment with Dr Perez to discuss surgery                                                                   Impression:  Metastatic neuroendocrine tumor status post right hemicolectomy for obstruction with persistent mesenteric months and metastatic tumor in the liver. Her main problem is diarrhea.  She underwent surgery about a year and a half back for severe acute cholecystitis and had open cholecystectomy.    I explained to her and her family members laboratory showing them on the picture about what her disease the natural course of the disease and high on the on the long-term plan. The plan would be to proceed with surgery for mesenteric lymphadenectomy and then plan on chemo embolization for the liver.  She is getting Lomotil for diarrhea but still has diarrhea.  I have symptoms may suggest that she may have as stated diarrhea therefore will place on Creon.    Since the diarrhea symptoms are  significant will plan for chemo embolization 1st and then plan on surgery. Talked to them about what chemo embolization the benefits and risk.  High I also addressed about the post embolization syndrome and the need for follow-up.    Plan:       She has completed the workup  Will schedule for high chemo embolization  Four weeks after the embolization after recovery will plan for ex lap and plan for mesenteric lymphadenectomy    Spent more than 40 min explaining the condition her care on long-term plan to her and the family members                  AMY Perez MD, FACS   Associate Professor of Surgery, [facility]   Neuroendocrine Surgery, Hepatic/Pancreatic & General Surgery   [address]   [place] [address]   ph. [phone]; [phone]   fax. [phone]

## 2018-10-22 ENCOUNTER — TELEPHONE (OUTPATIENT)
Dept: FAMILY MEDICINE | Facility: CLINIC | Age: 68
End: 2018-10-22

## 2018-10-22 ENCOUNTER — OUTPATIENT CASE MANAGEMENT (OUTPATIENT)
Dept: ADMINISTRATIVE | Facility: OTHER | Age: 68
End: 2018-10-22

## 2018-10-22 NOTE — LETTER
October 22, 2018    Patito TOBIAS Sommerjossy  [address]  [place] [address]             [facility] [address] [place] [address] Dear Ms. Chayo:      I have enclosed National Suicide Prevention Lifeline as discussed via telephone today. Also, to schedule an appointment with [facility], phone [phone]. I have reached out to the oncology social workers and hope to have a plan for you to get counseling services through their services.  I hope this will be helpful.    If any additional needs arise, please contact me at [phone].    Sincerely,      Chloé Lion, [facility]    Outpatient Complex Care Management

## 2018-10-22 NOTE — TELEPHONE ENCOUNTER
----- Message from Yeimy Boateng sent at 10/22/2018 12:05 PM CDT -----  Contact: Vita from [facility] [phone]  Vita wants to know if order was received and signed. Please call and advise.

## 2018-10-22 NOTE — PROGRESS NOTES
Summary: Phoned patient. Patient reviewed her doctor's appointment and upcoming surgery. She stated that she is scheduled to have lab work on Thursday for evaluation of UTI and completion of antibiotics. Patient seems well informed about her medical conditions.   Patient stated that 3 of her 4 sons attended her MD appointments with her. She noted that her brother  recently. She stated that she is alone during the day and is having trouble sleeping. She noted concern that she will become depressed like she did in the past. She said that she feels like she needs someone to talk to about her concerns. She noted that she is isolated during the day. Her family works during the day. She has a sister in law that she feels most comfortable talking to but she lives out of state and also works.  She denied current SI. Explored options for therapy. Per patient preference, provided National Suicide Prevention Lifeline. Offered support and encouraged to call LCSW with needs. Discussed plans to schedule/coordinate services for MH services after surgery. Patient in agreement and indicated intent to call lifeline when needed.     Interventions: Provided supportive counseling.   Developed a crisis plan to address s/s of depression.   Phone message to oncology social worker to explore treatment options.     Plan: Follow up in one week. Mail letter to provide resources.     Todays OPCM Self-Management Care Plan was developed with the patients/caregivers input and was based on identified barriers from todays assessment.  Goals were written today with the patient/caregiver and the patient has agreed to work towards these goals to improve his/her overall well-being. Patient verbalized understanding of the care plan, goals, and all of today's instructions. Encouraged patient/caregiver to communicate with his/her physician and health care team about health conditions and the treatment plan.  Provided my contact information today  and encouraged patient/caregiver to call me with any questions as needed.    Received call back from oncology SW. Collaborated on patient's needs. Suggested sending in basket message to oncologist requesting referral to Dr. Motta, psychologist, when oncologist is assigned.

## 2018-10-25 ENCOUNTER — TELEPHONE (OUTPATIENT)
Dept: FAMILY MEDICINE | Facility: CLINIC | Age: 68
End: 2018-10-25

## 2018-10-25 NOTE — TELEPHONE ENCOUNTER
Patient states she is having diarrhea and vomiting and would like to have pain medication. Patient states the Tramadol given last time doesn't work. She confirms she is taking Lomotil and Phenergan but she can't sleep because of the pain. Please advise.

## 2018-10-25 NOTE — TELEPHONE ENCOUNTER
----- Message from Stephanie Diehl sent at 10/25/2018  9:25 AM CDT -----  Contact: [phone]/self  Patient requesting to speak with the nurse (personal).  Please advise

## 2018-10-25 NOTE — TELEPHONE ENCOUNTER
----- Message from Yris Martins sent at 10/25/2018  8:34 AM CDT -----  Contact: [phone]/ self   Pt called stating she is having diarrhea and stomach pain . Pt its requesting a prescription ASAP . Please advise

## 2018-10-25 NOTE — TELEPHONE ENCOUNTER
Pt may take 2 of her phenergan  If she is having trouble sleeping due to the discomfort. Narcotics are not appropriate  With gastroenteritis.

## 2018-10-25 NOTE — TELEPHONE ENCOUNTER
Patient informed Dr. Hernandez will not prescribe pain medication along with Lomotil and Phenergan. Informed patient Dr. Hernandez wants her to take 2 25mg tablets of Phenergan. Patient verbalized understanding.

## 2018-10-29 ENCOUNTER — OUTPATIENT CASE MANAGEMENT (OUTPATIENT)
Dept: ADMINISTRATIVE | Facility: OTHER | Age: 68
End: 2018-10-29

## 2018-10-29 NOTE — PROGRESS NOTES
Summary: Phoned patient. Patient stated that she is not having relief from the diarrhea. She stated that she is becoming despondent with the diarrhea being so bad and not getting relief. She noted that she has an MD appointment at 1 pm today and does have transportation. She had a HH visit yesterday. Provided encouragement, informed her of contact with oncology SW who will refer her to psychologist after her surgery. Encouraged patient to call LCSW with any needs. She stated that she feels that she will need someone to assist her with baths after the surgery.     Interventions: Provided supportive counseling.     Plan: Follow up after MD appointment to further assess mental health status. Contact Home Health to determine level of service, assess home health aide v. Waiver services.     Todays OPCM Self-Management Care Plan was developed with the patients/caregivers input and was based on identified barriers from todays assessment.  Goals were written today with the patient/caregiver and the patient has agreed to work towards these goals to improve his/her overall well-being. Patient verbalized understanding of the care plan, goals, and all of today's instructions. Encouraged patient/caregiver to communicate with his/her physician and health care team about health conditions and the treatment plan.  Provided my contact information today and encouraged patient/caregiver to call me with any questions as needed.

## 2018-10-30 ENCOUNTER — TELEPHONE (OUTPATIENT)
Dept: FAMILY MEDICINE | Facility: CLINIC | Age: 68
End: 2018-10-30

## 2018-10-30 DIAGNOSIS — R11.0 NAUSEA IN ADULT: ICD-10-CM

## 2018-10-30 RX ORDER — PROMETHAZINE HYDROCHLORIDE 25 MG/1
25 TABLET ORAL EVERY 6 HOURS PRN
Qty: 30 TABLET | Refills: 0 | Status: ON HOLD | OUTPATIENT
Start: 2018-10-30 | End: 2018-12-04 | Stop reason: HOSPADM

## 2018-10-30 NOTE — TELEPHONE ENCOUNTER
Informed patient office didn't receive any notes on PA for prescription. Tried to call pharmacy, pharmacy was closed. Informed patient phone call will be placed tomorrow.

## 2018-10-30 NOTE — TELEPHONE ENCOUNTER
----- Message from Yris Martins sent at 10/30/2018  2:05 PM CDT -----  Contact: [phone]/ self   Pt called stating the medications for nausea needs to be approved by by her insurance . Please advise

## 2018-10-30 NOTE — TELEPHONE ENCOUNTER
----- Message from Alexis Hernandez sent at 10/30/2018  8:49 AM CDT -----  Contact: self/[phone]  Patient is requesting a refill on her medications.  Please note she has a new pharmacy that is listed below.  She also stated she needs this done today please.    Please call and advise when sent to pharmacy.    promethazine (PHENERGAN) 25 MG tablet    [facility]  [address]  [place] [address]    Phone is [phone]  Fax is [phone]

## 2018-10-31 ENCOUNTER — TELEPHONE (OUTPATIENT)
Dept: FAMILY MEDICINE | Facility: CLINIC | Age: 68
End: 2018-10-31

## 2018-10-31 NOTE — TELEPHONE ENCOUNTER
----- Message from Mindy Varela sent at 10/31/2018 11:28 AM CDT -----  Contact: [phone]/Self  Patient states pharmacy needs a PA for promethazine (PHENERGAN) 25 MG tablet    [facility] [place] - [address]

## 2018-11-01 ENCOUNTER — TELEPHONE (OUTPATIENT)
Dept: FAMILY MEDICINE | Facility: CLINIC | Age: 68
End: 2018-11-01

## 2018-11-01 NOTE — TELEPHONE ENCOUNTER
----- Message from Barbie Shea sent at 11/1/2018  1:54 PM CDT -----  Contact: Self [phone]  Patient would like a refill for promethazine (PHENERGAN) 25 MG tablet sent to [facility] [place] [address] [address]. Patient states her insurance needs an authorization from the doctor. Please advise.

## 2018-11-02 ENCOUNTER — OUTPATIENT CASE MANAGEMENT (OUTPATIENT)
Dept: ADMINISTRATIVE | Facility: OTHER | Age: 68
End: 2018-11-02

## 2018-11-02 NOTE — PROGRESS NOTES
SUMMARY  1st Attempt to complete SW follow-up for Outpatient Care Management; left message requesting return call.  LCSW will reattempt at a later date.      INTERVENTION  Contacted [facility] about anticipated need for aides after surgery. Phoned patient, left message to call.     PLAN  Follow up to assist with needs post procedure.

## 2018-11-06 ENCOUNTER — TELEPHONE (OUTPATIENT)
Dept: ADMINISTRATIVE | Facility: CLINIC | Age: 68
End: 2018-11-06

## 2018-11-07 NOTE — TELEPHONE ENCOUNTER
Home Health Recert 10/30/2018 to 12/28/2018 with [facility], Dr Magdalena Hernandez.  service.

## 2018-11-13 ENCOUNTER — TELEPHONE (OUTPATIENT)
Dept: NEUROLOGY | Facility: HOSPITAL | Age: 68
End: 2018-11-13

## 2018-11-13 NOTE — TELEPHONE ENCOUNTER
----- Message from Katalina Gaytan sent at 11/13/2018 10:50 AM CST -----  Contact: patient  BRIAN- patient called to state that she has transportation problems she would like to reschedule all of her appts to next week. [phone]

## 2018-11-13 NOTE — TELEPHONE ENCOUNTER
Returned pts call to inform TACE cannot be done next week due to Thanksgiving holiday. Will need to select another date. No answer. LVM to return call. Awaiting call back.

## 2018-11-13 NOTE — TELEPHONE ENCOUNTER
Received call back from pt. Pt needing to reschedule TACE. Advised cannot reschedule tace for next week due to thanksgiving. Offered 11/30 or 12/7, pt will call back in AM to confirm date, message sent to IR scheduling to inform pt will not be present on Friday

## 2018-11-14 ENCOUNTER — TELEPHONE (OUTPATIENT)
Dept: NEUROLOGY | Facility: HOSPITAL | Age: 68
End: 2018-11-14

## 2018-11-14 RX ORDER — DIPHENOXYLATE HYDROCHLORIDE AND ATROPINE SULFATE 2.5; .025 MG/1; MG/1
TABLET ORAL
Qty: 60 TABLET | Refills: 2 | Status: ON HOLD | OUTPATIENT
Start: 2018-11-14 | End: 2018-12-04 | Stop reason: HOSPADM

## 2018-11-14 NOTE — TELEPHONE ENCOUNTER
----- Message from Barbie Shea sent at 11/14/2018 12:25 PM CST -----  Contact: Self [phone]  Patient would like a refill for diphenoxylate-atropine 2.5-0.025 mg (LOMOTIL) 2.5-0.025 mg per tablet sent to [facility] [phone]. Please advise.

## 2018-11-14 NOTE — TELEPHONE ENCOUNTER
Received call back from pt. Pt would like to reschedule TACE to 11/30. Rescheduled appts and procedure, pt verbalizes understanding

## 2018-11-14 NOTE — TELEPHONE ENCOUNTER
----- Message from Angela Cuba sent at 11/14/2018 12:46 PM CST -----  Contact: Patient  BRIAN-Patient needs to speak to the nurse about procedure change. Patient can come on 11/30/18. Call patient at  [phone]

## 2018-11-16 ENCOUNTER — OUTPATIENT CASE MANAGEMENT (OUTPATIENT)
Dept: ADMINISTRATIVE | Facility: OTHER | Age: 68
End: 2018-11-16

## 2018-11-16 NOTE — PROGRESS NOTES
Summary: Phoned patient. Patient stated that she had changed her appointment times so that her children can go with her for the procedure. She stated that she continues to have diarrhea and doesn't go anywhere. Patient continues to be hopeful, asked about her [employer] benefit for gym membership and a dietician who can help her with diet changes to control her diarrhea. Patient stated that her legs are very weak. She continues to see her  nurse weekly; however, she no longer has PT. Explored transportation to gym and ability to exercise. She stated that she would ask about transportation to a gym.   Patient agreed to mail from [facility] regarding [employer] benefits and contact prior to procedure. Verified that patient has [facility] phone number to contact with needs.      Interventions: Provided supportive counseling. Verification of benefits, coordination of needs.     Plan: Mail [employer] benefit information. Follow up on 11/28/18.     Today[facility] Self-Management Care Plan was developed with the patients/caregivers input and was based on identified barriers from todays assessment.  Goals were written today with the patient/caregiver and the patient has agreed to work towards these goals to improve his/her overall well-being. Patient verbalized understanding of the care plan, goals, and all of today's instructions. Encouraged patient/caregiver to communicate with his/her physician and health care team about health conditions and the treatment plan.  Provided my contact information today and encouraged patient/caregiver to call me with any questions as needed.

## 2018-11-16 NOTE — LETTER
November 16, 2018    Patito Domingo  [address]  [facility] [address]             [facility] [address] [place] [address] Dear MsRylan Chayo:      I have enclosed [employer] as discussed via telephone today. They have representatives there to assist you in signing up for dietician and exercise. They also offer programs for nutrition advice and fitness.  I hope this will be helpful.    If any additional needs arise, please contact me at [phone].    Sincerely,      Chloé Lion, CHARLOTTEW    Outpatient Complex Care Management

## 2018-11-28 ENCOUNTER — OUTPATIENT CASE MANAGEMENT (OUTPATIENT)
Dept: ADMINISTRATIVE | Facility: OTHER | Age: 68
End: 2018-11-28

## 2018-11-28 NOTE — PROGRESS NOTES
SUMMARY  1st Attempt to complete SW follow-up for Outpatient Care Management    INTERVENTION   left message requesting return call.    PLAN    LCSW will reattempt at a later date.

## 2018-11-29 ENCOUNTER — LAB VISIT (OUTPATIENT)
Dept: LAB | Facility: HOSPITAL | Age: 68
End: 2018-11-29
Attending: SURGERY
Payer: MEDICARE

## 2018-11-29 ENCOUNTER — OFFICE VISIT (OUTPATIENT)
Dept: NEUROLOGY | Facility: HOSPITAL | Age: 68
End: 2018-11-29
Attending: SURGERY
Payer: MEDICARE

## 2018-11-29 VITALS
SYSTOLIC BLOOD PRESSURE: 124 MMHG | DIASTOLIC BLOOD PRESSURE: 52 MMHG | HEART RATE: 69 BPM | HEIGHT: 66 IN | WEIGHT: 170.31 LBS | TEMPERATURE: 99 F | BODY MASS INDEX: 27.37 KG/M2

## 2018-11-29 DIAGNOSIS — C7B.02 SECONDARY MALIGNANT CARCINOID TUMOR OF LIVER: Primary | ICD-10-CM

## 2018-11-29 DIAGNOSIS — C78.7 SECONDARY MALIGNANT NEOPLASM OF LIVER: ICD-10-CM

## 2018-11-29 DIAGNOSIS — C7B.02 METASTATIC MALIGNANT CARCINOID TUMOR TO LIVER: ICD-10-CM

## 2018-11-29 DIAGNOSIS — R11.0 NAUSEA IN ADULT: ICD-10-CM

## 2018-11-29 DIAGNOSIS — C7B.00 METASTATIC CARCINOID TUMOR: ICD-10-CM

## 2018-11-29 LAB
ALBUMIN SERPL BCP-MCNC: 2.7 G/DL
ALP SERPL-CCNC: 74 U/L
ALT SERPL W/O P-5'-P-CCNC: <5 U/L
ANION GAP SERPL CALC-SCNC: 3 MMOL/L
APTT BLDCRRT: 27.8 SEC
AST SERPL-CCNC: 10 U/L
BASOPHILS # BLD AUTO: 0.02 K/UL
BASOPHILS NFR BLD: 0.4 %
BILIRUB SERPL-MCNC: 0.2 MG/DL
BUN SERPL-MCNC: 13 MG/DL
CALCIUM SERPL-MCNC: 8.9 MG/DL
CHLORIDE SERPL-SCNC: 107 MMOL/L
CO2 SERPL-SCNC: 29 MMOL/L
CREAT SERPL-MCNC: 1.1 MG/DL
DIFFERENTIAL METHOD: ABNORMAL
EOSINOPHIL # BLD AUTO: 0.1 K/UL
EOSINOPHIL NFR BLD: 3.1 %
ERYTHROCYTE [DISTWIDTH] IN BLOOD BY AUTOMATED COUNT: 15.2 %
EST. GFR  (AFRICAN AMERICAN): >60 ML/MIN/1.73 M^2
EST. GFR  (NON AFRICAN AMERICAN): 52 ML/MIN/1.73 M^2
GLUCOSE SERPL-MCNC: 100 MG/DL
HCT VFR BLD AUTO: 25.4 %
HGB BLD-MCNC: 7.5 G/DL
INR PPP: 0.9
LYMPHOCYTES # BLD AUTO: 1.2 K/UL
LYMPHOCYTES NFR BLD: 27.2 %
MCH RBC QN AUTO: 26.2 PG
MCHC RBC AUTO-ENTMCNC: 29.5 G/DL
MCV RBC AUTO: 89 FL
MONOCYTES # BLD AUTO: 0.5 K/UL
MONOCYTES NFR BLD: 11 %
NEUTROPHILS # BLD AUTO: 2.6 K/UL
NEUTROPHILS NFR BLD: 57.9 %
PLATELET # BLD AUTO: 274 K/UL
PMV BLD AUTO: 9.3 FL
POTASSIUM SERPL-SCNC: 3.9 MMOL/L
PROT SERPL-MCNC: 6.4 G/DL
PROTHROMBIN TIME: 9.8 SEC
RBC # BLD AUTO: 2.86 M/UL
SODIUM SERPL-SCNC: 139 MMOL/L
WBC # BLD AUTO: 4.53 K/UL

## 2018-11-29 PROCEDURE — 36415 COLL VENOUS BLD VENIPUNCTURE: CPT

## 2018-11-29 PROCEDURE — 85025 COMPLETE CBC W/AUTO DIFF WBC: CPT

## 2018-11-29 PROCEDURE — 80053 COMPREHEN METABOLIC PANEL: CPT

## 2018-11-29 PROCEDURE — 85610 PROTHROMBIN TIME: CPT

## 2018-11-29 PROCEDURE — 85730 THROMBOPLASTIN TIME PARTIAL: CPT

## 2018-11-29 PROCEDURE — 99214 OFFICE O/P EST MOD 30 MIN: CPT | Performed by: SURGERY

## 2018-11-29 RX ORDER — OXYCODONE AND ACETAMINOPHEN 5; 325 MG/1; MG/1
1 TABLET ORAL EVERY 4 HOURS PRN
Status: CANCELLED | OUTPATIENT
Start: 2018-11-29

## 2018-11-29 RX ORDER — CIPROFLOXACIN 500 MG/1
500 TABLET ORAL 2 TIMES DAILY
Qty: 14 TABLET | Refills: 0 | Status: ON HOLD | OUTPATIENT
Start: 2018-11-29 | End: 2018-12-04 | Stop reason: HOSPADM

## 2018-11-29 RX ORDER — FAMOTIDINE 20 MG/1
20 TABLET, FILM COATED ORAL EVERY 12 HOURS
Status: CANCELLED | OUTPATIENT
Start: 2018-11-29

## 2018-11-29 RX ORDER — LORAZEPAM 0.5 MG/1
0.5 TABLET ORAL EVERY 8 HOURS PRN
Status: CANCELLED | OUTPATIENT
Start: 2018-11-30

## 2018-11-29 RX ORDER — SODIUM CHLORIDE 9 MG/ML
500 INJECTION, SOLUTION INTRAVENOUS ONCE
Status: CANCELLED | OUTPATIENT
Start: 2018-11-29 | End: 2018-11-29

## 2018-11-29 RX ORDER — FUROSEMIDE 10 MG/ML
20 INJECTION INTRAMUSCULAR; INTRAVENOUS ONCE
Status: CANCELLED | OUTPATIENT
Start: 2018-11-29 | End: 2018-11-29

## 2018-11-29 RX ORDER — DIPHENHYDRAMINE HYDROCHLORIDE 50 MG/ML
50 INJECTION INTRAMUSCULAR; INTRAVENOUS ONCE
Status: CANCELLED | OUTPATIENT
Start: 2018-11-29 | End: 2018-11-29

## 2018-11-29 RX ORDER — LIDOCAINE HYDROCHLORIDE 10 MG/ML
1 INJECTION, SOLUTION EPIDURAL; INFILTRATION; INTRACAUDAL; PERINEURAL ONCE
Status: CANCELLED | OUTPATIENT
Start: 2018-11-29 | End: 2018-11-29

## 2018-11-29 RX ORDER — DEXTROSE MONOHYDRATE, SODIUM CHLORIDE, AND POTASSIUM CHLORIDE 50; 1.49; 4.5 G/1000ML; G/1000ML; G/1000ML
INJECTION, SOLUTION INTRAVENOUS CONTINUOUS
Status: CANCELLED | OUTPATIENT
Start: 2018-11-29

## 2018-11-29 RX ORDER — ALLOPURINOL 300 MG/1
300 TABLET ORAL DAILY
Status: CANCELLED | OUTPATIENT
Start: 2018-11-29 | End: 2018-12-01

## 2018-11-29 RX ORDER — ONDANSETRON 2 MG/ML
4 INJECTION INTRAMUSCULAR; INTRAVENOUS EVERY 8 HOURS PRN
Status: CANCELLED | OUTPATIENT
Start: 2018-11-29

## 2018-11-29 RX ORDER — MIDAZOLAM HYDROCHLORIDE 1 MG/ML
0.5 INJECTION INTRAMUSCULAR; INTRAVENOUS ONCE
Status: CANCELLED | OUTPATIENT
Start: 2018-11-29 | End: 2018-11-29

## 2018-11-29 RX ORDER — MAGNESIUM SULFATE HEPTAHYDRATE 40 MG/ML
2 INJECTION, SOLUTION INTRAVENOUS ONCE
Status: CANCELLED | OUTPATIENT
Start: 2018-11-29 | End: 2018-11-29

## 2018-11-29 RX ORDER — FENTANYL CITRATE 50 UG/ML
50 INJECTION, SOLUTION INTRAMUSCULAR; INTRAVENOUS ONCE
Status: CANCELLED | OUTPATIENT
Start: 2018-11-29 | End: 2018-11-29

## 2018-11-29 RX ORDER — MANNITOL 250 MG/ML
12.5 INJECTION, SOLUTION INTRAVENOUS ONCE
Status: CANCELLED | OUTPATIENT
Start: 2018-11-29 | End: 2018-11-29

## 2018-11-29 RX ORDER — DEXAMETHASONE SODIUM PHOSPHATE 4 MG/ML
8 INJECTION, SOLUTION INTRA-ARTICULAR; INTRALESIONAL; INTRAMUSCULAR; INTRAVENOUS; SOFT TISSUE ONCE
Status: CANCELLED | OUTPATIENT
Start: 2018-11-29 | End: 2018-11-29

## 2018-11-29 RX ORDER — PROMETHAZINE HYDROCHLORIDE 25 MG/1
25 TABLET ORAL EVERY 6 HOURS PRN
Qty: 20 TABLET | Refills: 0 | Status: ON HOLD | OUTPATIENT
Start: 2018-11-29 | End: 2018-12-04 | Stop reason: HOSPADM

## 2018-11-29 NOTE — PROGRESS NOTES
[facility]:  [facility]  A collaboration between [facility] and [facility]      PATIENT: Patito Domingo  MRN: [id number]  DATE: 11/29/2018    Subjective:      Chief Complaint: Follow-up (TACE #1)  HEALTH SYSTEM                                                NEUROENDOCRINE TUMOR MULTIDISCIPLINARY TUMOR BOARD  _____________________________________________________________________     PRESENTER:   SASHA Herbert MD     REASON FOR PRESENTATION:  Treatment Plan and Scan Review, MRI and O scan     ATTENDEES:   Surgery:              MD ENA Damico MD T. 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)                 Past Surgical History:   Procedure Laterality Date    CATARACT EXTRACTION Left 10/2017    CHOLECYSTECTOMY        CHOLECYSTECTOMY N/A 12/30/2017     [facility] OR    COLON SURGERY        cystoscope        EYE SURGERY        HYSTERECTOMY   5/1996    INSERTION-INTRAOCULAR LENS (IOL) Left 10/4/2017     Performed by [facility] Em [facility] at [facility] OR    LITHOTRIPSY        LIVER BIOPSY   9/14     carcinoid    PHACOEMULSIFICATION-ASPIRATION-CATARACT Left 10/4/2017     Performed by [facility]mi Em [facility] at [facility] OR      ________________________________________________________________  Chief Complaint: Follow-up (reveiw ct scans and no gallium scan  "necessary per Dr Perez)     C/o lot of diarrhea     Having cold now with low grade temp  She was accompanied by her son  She was previously scheduled for MRI and gallium scan.  Patient did not show up. Even with isotope was ready the gallium scan was canceled and isotope was wasted.           DISCUSSION:  No recent MRI or Octreoscan available. Innumerable hepatic mets which are SSA avid are more obvious than on prior MRI 12/30/17, but the technique is different.  Stable RLQ mass and also avid.  Can TACE        BOARD RECOMMENDATIONS:   Appointment with Dr Perez to discuss surgery                    Feeling ok  Having diarrhea    Vitals:   Vitals:    11/29/18 1330   BP: (!) 124/52   Pulse: 69   Temp: 99.1 °F (37.3 °C)   TempSrc: Oral   Weight: 77.3 kg (170 lb 4.9 oz)   Height: 5' 6" (1.676 m)        Karnofsky Score:     Diagnosis: No diagnosis found.     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)        Past Surgical History:  Past Surgical History:   Procedure Laterality Date    CATARACT EXTRACTION Left 10/2017    CHOLECYSTECTOMY      CHOLECYSTECTOMY N/A 12/30/2017    [facility] at [facility]    COLON SURGERY      cystoscope      EYE SURGERY      HYSTERECTOMY  5/1996    INSERTION-INTRAOCULAR LENS (IOL) Left 10/4/2017    [facility] at [facility] OR    LITHOTRIPSY      LIVER BIOPSY  9/14    carcinoid    PHACOEMULSIFICATION-ASPIRATION-CATARACT Left 10/4/2017    Performed by [facility]mi Em [facility] at [facility] OR       Family History:  Family History   Problem Relation Age of Onset    Cancer Mother         unknown    Alzheimer's disease Father     Stroke Sister     No Known Problems Son     " No Known Problems Son     No Known Problems Son     No Known Problems Son     Kidney disease Neg Hx        Allergies:  Review of patient's allergies indicates:   Allergen Reactions    Epinephrine Anaphylaxis     Can cause  a Carcinoid Crisis    Contrast media Hives, Itching and Swelling    Ibuprofen Hives, Itching and Swelling    Iodinated contrast- oral and iv dye     Sulfa (sulfonamide antibiotics) Hives, Itching and Swelling       Medications:  Current Outpatient Medications   Medication Sig Dispense Refill    bumetanide (BUMEX) 0.5 MG Tab Take 1 tablet (0.5 mg total) by mouth once daily. (Patient taking differently: Take 0.5 mg by mouth daily as needed. ) 30 tablet 2    diphenoxylate-atropine 2.5-0.025 mg (LOMOTIL) 2.5-0.025 mg per tablet TAKE 1 TABLET BY MOUTH 4 TIMES A DAY AS NEEDED FOR DIARRHEA 60 tablet 2    ferrous gluconate 324 mg (36 mg iron) Tab Take 1 tablet by mouth once daily.      gabapentin (NEURONTIN) 100 MG capsule takes one capsule  capsule 6    lipase-protease-amylase 12,000-38,000-60,000 units (CREON) CpDR Take 1 capsule by mouth 3 (three) times daily with meals. 90 capsule 11    losartan (COZAAR) 50 MG tablet TAKE 1 TABLET BY MOUTH DAILY 30 tablet 2    magnesium oxide (MAG-OX) 400 mg tablet Take 1 tablet (400 mg total) by mouth once daily. 30 tablet 2    metoprolol tartrate (LOPRESSOR) 50 MG tablet Take 1 tablet (50 mg total) by mouth 2 (two) times daily. 180 tablet 0    nystatin (MYCOSTATIN) 100,000 unit/mL suspension SWISH AND SWALLOW 1 TEASPOON BY MOUTH 4 TIMES A DAY. ONCE RELIEVED CONTINUE FOR 48 HOURS (Patient taking differently: as needed. SWISH AND SWALLOW 1 TEASPOON BY MOUTH 4 TIMES A DAY. ONCE RELIEVED CONTINUE FOR 48 HOURS) 60 Bottle 0    oxyCODONE (ROXICODONE) 30 MG Tab Take 30 mg by mouth every 4 (four) hours as needed.       promethazine (PHENERGAN) 25 MG tablet Take 1 tablet (25 mg total) by mouth every 6 (six) hours as needed for Nausea. 30 tablet 0     walker (ULTRA-LIGHT ROLLATOR) Misc 1 Units by Misc.(Non-Drug; Combo Route) route once daily. 1 each 0    lanreotide (SOMATULINE DEPOT) 120 mg/0.5 mL Syrg Inject 120 mg into the skin every 28 days.      triamcinolone acetonide 0.1% (KENALOG) 0.1 % ointment Apply topically 2 (two) times daily. for 10 days 30 g 1     No current facility-administered medications for this visit.        Review of Systems   Constitutional: Positive for activity change, appetite change and fatigue. Negative for diaphoresis, fever and unexpected weight change.   HENT: Negative for congestion, dental problem, drooling, ear discharge, ear pain, facial swelling, mouth sores, nosebleeds, postnasal drip, sinus pressure, sinus pain, sneezing and tinnitus.    Eyes: Negative for photophobia, pain, redness and itching.   Respiratory: Negative for shortness of breath, wheezing and stridor.    Cardiovascular: Negative for chest pain, palpitations and leg swelling.   Gastrointestinal: Positive for diarrhea. Negative for abdominal pain, blood in stool, constipation and rectal pain.   Endocrine: Negative.    Genitourinary: Negative.    Allergic/Immunologic: Negative.    Neurological: Negative for tremors, seizures, syncope, speech difficulty, weakness, light-headedness, numbness and headaches.   Hematological: Negative.    Psychiatric/Behavioral: Negative.       Objective:      Physical Exam   Constitutional: She is oriented to person, place, and time. She appears well-developed and well-nourished. No distress.   HENT:   Head: Normocephalic.   Right Ear: External ear normal.   Left Ear: External ear normal.   Eyes: Conjunctivae and EOM are normal. Pupils are equal, round, and reactive to light.   Cardiovascular: Normal rate and regular rhythm.   Pulmonary/Chest: Effort normal and breath sounds normal.   Abdominal: Soft.   Musculoskeletal: Normal range of motion.   Neurological: She is oriented to person, place, and time.   Skin: Skin is warm and dry.  She is not diaphoretic.   Psychiatric: She has a normal mood and affect. Her behavior is normal.      Assessment:       No diagnosis found.    Laboratory Data:   Results for ALEXIS GORDON (MRN [id number]) as of 11/29/2018 14:03   Ref. Range 10/5/2018 09:53 10/18/2018 00:00 11/29/2018 13:21   WBC Latest Ref Range: 3.90 - 12.70 K/uL   4.53   RBC Latest Ref Range: 4.00 - 5.40 M/uL   2.86 (L)   Hemoglobin Latest Ref Range: 12.0 - 16.0 g/dL   7.5 (L)   Hematocrit Latest Ref Range: 37.0 - 48.5 %   25.4 (L)   MCV Latest Ref Range: 82 - 98 fL   89   MCH Latest Ref Range: 27.0 - 31.0 pg   26.2 (L)   MCHC Latest Ref Range: 32.0 - 36.0 g/dL   29.5 (L)   RDW Latest Ref Range: 11.5 - 14.5 %   15.2 (H)   Platelets Latest Ref Range: 150 - 350 K/uL   274   MPV Latest Ref Range: 9.2 - 12.9 fL   9.3   Gran% Latest Ref Range: 38.0 - 73.0 %   57.9   Gran # (ANC) Latest Ref Range: 1.8 - 7.7 K/uL   2.6   Lymph% Latest Ref Range: 18.0 - 48.0 %   27.2   Lymph # Latest Ref Range: 1.0 - 4.8 K/uL   1.2   Mono% Latest Ref Range: 4.0 - 15.0 %   11.0   Mono # Latest Ref Range: 0.3 - 1.0 K/uL   0.5   Eosinophil% Latest Ref Range: 0.0 - 8.0 %   3.1   Eos # Latest Ref Range: 0.0 - 0.5 K/uL   0.1   Basophil% Latest Ref Range: 0.0 - 1.9 %   0.4   Baso # Latest Ref Range: 0.00 - 0.20 K/uL   0.02   Differential Method Unknown   Automated   Sodium Latest Ref Range: 136 - 145 mmol/L   139   Potassium Latest Ref Range: 3.5 - 5.1 mmol/L   3.9   Chloride Latest Ref Range: 95 - 110 mmol/L   107   CO2 Latest Ref Range: 23 - 29 mmol/L   29   Anion Gap Latest Ref Range: 8 - 16 mmol/L   3 (L)   BUN, Bld Latest Ref Range: 8 - 23 mg/dL   13   Creatinine Latest Ref Range: 0.5 - 1.4 mg/dL   1.1   eGFR if non African American Latest Ref Range: >60 mL/min/1.73 m^2   52 (A)   eGFR if African American Latest Ref Range: >60 mL/min/1.73 m^2   >60   Glucose Latest Ref Range: 70 - 110 mg/dL   100   Calcium Latest Ref Range: 8.7 - 10.5 mg/dL   8.9   Alkaline Phosphatase  Latest Ref Range: 55 - 135 U/L   74   Total Protein Latest Ref Range: 6.0 - 8.4 g/dL   6.4   Albumin Latest Ref Range: 3.5 - 5.2 g/dL   2.7 (L)   Total Bilirubin Latest Ref Range: 0.1 - 1.0 mg/dL   0.2   AST Latest Ref Range: 10 - 40 U/L   10   ALT Latest Ref Range: 10 - 44 U/L   <5 (L)   EXT 5 HIAA BLOOD Latest Ref Range: 0 - 22 ng/ml  294 (A)    EXT Albumin Latest Ref Range: 3.6 - 5.1 g/dl  3.8    EXT Alkaline Phosphatase Latest Ref Range: 33 - 130 u/l  79    EXT ALT Latest Ref Range: 6 - 29 u/l  8    EXT AST Latest Ref Range: 10 - 35 u/l  14    EXT BilirubiN Total Latest Ref Range: 0.2 - 1.2 mg/dl  0.5    EXT BUN Latest Ref Range: 7 - 25 mg/dl  13    EXT Calcium Latest Ref Range: 8.6 - 10.4 mg/dl  9.1    EXT Chloride Latest Ref Range: 98 - 110 mmol/l  102    EXT CHROMOGRANIN A Latest Ref Range: 25 - 140 ng/ml  313 (A)    EXT CO2 Latest Ref Range: 20 - 32 mmol/l  30    EXT Creatinine Latest Ref Range: 0.50 - 0.99 mg/dl  0.85    EXT Glucose Latest Ref Range: 65 - 99 mg/dl  95    EXT Hematocrit Latest Ref Range: 35.0 - 45.0 %  26.5 (A)    EXT Hemoglobin Latest Ref Range: 11.7 - 15.5 g/dl  8.3 (A)    EXT NEUROKININ A KAYDEN Latest Ref Range: 0 - 40 pg/ml  21    EXT PANCREASTATIN KAYDEN Latest Ref Range: 10 - 135 pg/ml  2,724 (A)    EXT Platelets Latest Ref Range: 140 - 400 1000/ul  258    EXT Potassium Latest Ref Range: 3.5 - 5.3 mmol/l  4.3    EXT Protein total Latest Ref Range: 6.1 - 8.1 g/dl  6.9    EXT SEROTONIN Latest Ref Range: 56 - 244 ng/ml  1,909 (A)    EXT Sodium Latest Ref Range: 135 - 146 mmol/l  139    EXT WBC Latest Ref Range: 3.8 - 10.8 1000/ul  4.5    NM TUMOR LOCALIZATION SPECT Unknown Rpt       Hannah GOINS MD 10/9/2018       Narrative     EXAMINATION:  NM TUMOR LOC OCTREO SCAN W/ SPECT/FUSION-MULTI DAY; NM TUMOR LOCALIZATION SPECT    CLINICAL HISTORY:  Secondary carcinoid tumors, unspecified site    TECHNIQUE:  Following intravenous administration of 6 mCi of 111In-octreotide, delayed whole body images  were acquired at 4 and 24 hours.  SPECT CT images were also acquired the abdomen at 4 hours and of the chest, abdomen and pelvis at 24 hours.    COMPARISON:  MRI abdomen 10/04/2018, octreotide scan 10/31/2014    FINDINGS:  Multiple sites of uptake within the liver, as well as within a partially calcified right lower quadrant mass.    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  The plan will be to proceed with surgery for the mesenteric disease and chemo embolization.  Because of her overall condition I decided to proceed with the 1st stone of chemo embolization and then plan on surgery.    I explained to her and her son about chemo embolization the recovery time the procedure the hospitalization time.    I also talked to him about the post embolization syndrome with nausea vomiting low-grade fever and fatigue for up to 2-3 weeks time.\    She has pain medication at home on would prefer promethazine for nausea instead of Zofran.  Will order for outpatient is Cipro and Phenergan today.    Next spent more than 40 min in explaining the procedure the recovery time hospitalization and the future plans IA lab greatly    Her hemoglobin today is 7.5 by.  I talked to Dr. Gonzalse the radiologist will be performing the procedure tomorrow.  Will proceed with the chemo embolization even with hemoglobin of 7.5 by will then high work her up for a low hemoglobin.  He also order for multi vitamin tablets today  Plan:       NPO after midnight  Preop orders and postprocedure orders placed  Chemoembolization tomorrow  A month from now on if stable will proceed with ex lap and mesenteric mass debulking and 3 months later will  undergo follow-up chemo embolizations                 AMY Perez MD, FACS   Associate Professor of Surgery, [facility]   Neuroendocrine Surgery, Hepatic/Pancreatic & General Surgery   [address] [place] [address]  [address]   [place] [address]   ph. [phone]; [phone]   fax. [phone]

## 2018-11-29 NOTE — PATIENT INSTRUCTIONS
Pre TACE instructions:    Nothing to eat or drink after midnight tonight.    Please report to 1st floor hospital admissions desk at the time given to you by Interventional Radiology. Someone will contact you by the end of the day to give you an arrival time.     *Remember to have labs (CBC & CMP) 10 days after TACE procedure- orders given to placed in [facility]    A hospital follow up appointment with surgeon is typically not needed, unless complications arise.     You will need repeat MRI in 2 months after TACE, depending on Radiologist recommendation.   This timeframe will be indicated at the time of your discharge.   If you are a local patient, orders for MRI will be placed electronically, and you can call [phone] to schedule your MRI appointment.   Please call our office after your MRI is completed.   We will review this MRI in our Multi Disciplinary Tumor Board to determine if next TACE is needed.   If it is decided that future TACE treatments are needed, our office will contact you to schedule additional TACE.  If additional TACE treatments are not needed, you will continue regular follow up with your managing Oncologist (Dr. Alba or Dr. Perkins).    Please contact our office with any questions

## 2018-11-29 NOTE — NURSING
Spoke with pt regarding arrival time, advised to arrive at 0730 . Advised npo at midnight and hold asa and dm medication. Advised must have a ride to and from procedure.

## 2018-11-30 ENCOUNTER — HOSPITAL ENCOUNTER (INPATIENT)
Facility: HOSPITAL | Age: 68
LOS: 4 days | Discharge: PSYCHIATRIC OR SUBSTANCE USE FACILITY | DRG: 829 | End: 2018-12-04
Attending: SURGERY | Admitting: SURGERY
Payer: MEDICARE

## 2018-11-30 DIAGNOSIS — I1A.0 RESISTANT HYPERTENSION: Chronic | ICD-10-CM

## 2018-11-30 DIAGNOSIS — C7B.02 SECONDARY MALIGNANT CARCINOID TUMOR OF LIVER: ICD-10-CM

## 2018-11-30 DIAGNOSIS — G89.4 CHRONIC PAIN SYNDROME: ICD-10-CM

## 2018-11-30 DIAGNOSIS — C78.7 SECONDARY MALIGNANT NEOPLASM OF LIVER: ICD-10-CM

## 2018-11-30 DIAGNOSIS — C7B.00 METASTATIC CARCINOID TUMOR: ICD-10-CM

## 2018-11-30 DIAGNOSIS — C7B.02 METASTATIC MALIGNANT CARCINOID TUMOR TO LIVER: Primary | ICD-10-CM

## 2018-11-30 DIAGNOSIS — F11.90 NARCOTIC DRUG USE: ICD-10-CM

## 2018-11-30 PROCEDURE — 63600175 PHARM REV CODE 636 W HCPCS: Performed by: STUDENT IN AN ORGANIZED HEALTH CARE EDUCATION/TRAINING PROGRAM

## 2018-11-30 PROCEDURE — 27000221 HC OXYGEN, UP TO 24 HOURS

## 2018-11-30 PROCEDURE — 63600175 PHARM REV CODE 636 W HCPCS: Performed by: RADIOLOGY

## 2018-11-30 PROCEDURE — 99900035 HC TECH TIME PER 15 MIN (STAT)

## 2018-11-30 PROCEDURE — 3E05305 INTRODUCTION OF OTHER ANTINEOPLASTIC INTO PERIPHERAL ARTERY, PERCUTANEOUS APPROACH: ICD-10-PCS | Performed by: RADIOLOGY

## 2018-11-30 PROCEDURE — 25000003 PHARM REV CODE 250: Performed by: SURGERY

## 2018-11-30 PROCEDURE — 94770 HC EXHALED C02 TEST: CPT

## 2018-11-30 PROCEDURE — 11000001 HC ACUTE MED/SURG PRIVATE ROOM

## 2018-11-30 PROCEDURE — 94761 N-INVAS EAR/PLS OXIMETRY MLT: CPT

## 2018-11-30 PROCEDURE — 63600175 PHARM REV CODE 636 W HCPCS: Performed by: SURGERY

## 2018-11-30 PROCEDURE — 25000003 PHARM REV CODE 250: Performed by: STUDENT IN AN ORGANIZED HEALTH CARE EDUCATION/TRAINING PROGRAM

## 2018-11-30 PROCEDURE — 04L33DZ OCCLUSION OF HEPATIC ARTERY WITH INTRALUMINAL DEVICE, PERCUTANEOUS APPROACH: ICD-10-PCS | Performed by: RADIOLOGY

## 2018-11-30 PROCEDURE — 25000003 PHARM REV CODE 250: Performed by: RADIOLOGY

## 2018-11-30 PROCEDURE — 25500020 PHARM REV CODE 255: Performed by: SURGERY

## 2018-11-30 RX ORDER — FENTANYL CITRATE 50 UG/ML
INJECTION, SOLUTION INTRAMUSCULAR; INTRAVENOUS CODE/TRAUMA/SEDATION MEDICATION
Status: COMPLETED | OUTPATIENT
Start: 2018-11-30 | End: 2018-11-30

## 2018-11-30 RX ORDER — DEXTROSE MONOHYDRATE, SODIUM CHLORIDE, AND POTASSIUM CHLORIDE 50; 1.49; 4.5 G/1000ML; G/1000ML; G/1000ML
INJECTION, SOLUTION INTRAVENOUS CONTINUOUS
Status: DISCONTINUED | OUTPATIENT
Start: 2018-11-30 | End: 2018-12-03

## 2018-11-30 RX ORDER — OXYCODONE HYDROCHLORIDE 5 MG/1
30 TABLET ORAL EVERY 6 HOURS PRN
Status: DISCONTINUED | OUTPATIENT
Start: 2018-11-30 | End: 2018-12-01

## 2018-11-30 RX ORDER — LORAZEPAM 0.5 MG/1
0.5 TABLET ORAL EVERY 8 HOURS PRN
Status: DISCONTINUED | OUTPATIENT
Start: 2018-11-30 | End: 2018-12-04 | Stop reason: HOSPADM

## 2018-11-30 RX ORDER — OXYCODONE AND ACETAMINOPHEN 5; 325 MG/1; MG/1
1 TABLET ORAL EVERY 4 HOURS PRN
Status: DISCONTINUED | OUTPATIENT
Start: 2018-11-30 | End: 2018-12-01

## 2018-11-30 RX ORDER — DIPHENHYDRAMINE HYDROCHLORIDE 50 MG/ML
INJECTION INTRAMUSCULAR; INTRAVENOUS CODE/TRAUMA/SEDATION MEDICATION
Status: COMPLETED | OUTPATIENT
Start: 2018-11-30 | End: 2018-11-30

## 2018-11-30 RX ORDER — HYDRALAZINE HYDROCHLORIDE 20 MG/ML
10 INJECTION INTRAMUSCULAR; INTRAVENOUS EVERY 4 HOURS PRN
Status: DISCONTINUED | OUTPATIENT
Start: 2018-11-30 | End: 2018-12-01

## 2018-11-30 RX ORDER — HYDROMORPHONE HCL IN 0.9% NACL 6 MG/30 ML
PATIENT CONTROLLED ANALGESIA SYRINGE INTRAVENOUS CONTINUOUS
Status: DISCONTINUED | OUTPATIENT
Start: 2018-11-30 | End: 2018-11-30

## 2018-11-30 RX ORDER — LIDOCAINE HYDROCHLORIDE 10 MG/ML
INJECTION INFILTRATION; PERINEURAL CODE/TRAUMA/SEDATION MEDICATION
Status: COMPLETED | OUTPATIENT
Start: 2018-11-30 | End: 2018-11-30

## 2018-11-30 RX ORDER — MIDAZOLAM HYDROCHLORIDE 1 MG/ML
INJECTION INTRAMUSCULAR; INTRAVENOUS CODE/TRAUMA/SEDATION MEDICATION
Status: COMPLETED | OUTPATIENT
Start: 2018-11-30 | End: 2018-11-30

## 2018-11-30 RX ORDER — DIPHENHYDRAMINE HYDROCHLORIDE 50 MG/ML
50 INJECTION INTRAMUSCULAR; INTRAVENOUS ONCE
Status: COMPLETED | OUTPATIENT
Start: 2018-11-30 | End: 2018-11-30

## 2018-11-30 RX ORDER — HYDROMORPHONE HCL IN 0.9% NACL 6 MG/30 ML
PATIENT CONTROLLED ANALGESIA SYRINGE INTRAVENOUS CONTINUOUS
Status: DISCONTINUED | OUTPATIENT
Start: 2018-11-30 | End: 2018-12-02

## 2018-11-30 RX ORDER — SODIUM CHLORIDE 9 MG/ML
500 INJECTION, SOLUTION INTRAVENOUS ONCE
Status: DISCONTINUED | OUTPATIENT
Start: 2018-11-30 | End: 2018-11-30 | Stop reason: HOSPADM

## 2018-11-30 RX ORDER — NALOXONE HCL 0.4 MG/ML
0.02 VIAL (ML) INJECTION
Status: DISCONTINUED | OUTPATIENT
Start: 2018-11-30 | End: 2018-12-04 | Stop reason: HOSPADM

## 2018-11-30 RX ORDER — FUROSEMIDE 10 MG/ML
20 INJECTION INTRAMUSCULAR; INTRAVENOUS ONCE
Status: COMPLETED | OUTPATIENT
Start: 2018-11-30 | End: 2018-11-30

## 2018-11-30 RX ORDER — ALLOPURINOL 300 MG/1
300 TABLET ORAL DAILY
Status: COMPLETED | OUTPATIENT
Start: 2018-12-01 | End: 2018-12-01

## 2018-11-30 RX ORDER — MANNITOL 250 MG/ML
12.5 INJECTION, SOLUTION INTRAVENOUS ONCE
Status: DISCONTINUED | OUTPATIENT
Start: 2018-11-30 | End: 2018-11-30 | Stop reason: HOSPADM

## 2018-11-30 RX ORDER — LOSARTAN POTASSIUM 50 MG/1
50 TABLET ORAL DAILY
Status: DISCONTINUED | OUTPATIENT
Start: 2018-12-01 | End: 2018-12-01

## 2018-11-30 RX ORDER — METOPROLOL TARTRATE 50 MG/1
50 TABLET ORAL 2 TIMES DAILY
Status: DISCONTINUED | OUTPATIENT
Start: 2018-11-30 | End: 2018-12-01

## 2018-11-30 RX ORDER — PROMETHAZINE HYDROCHLORIDE 25 MG/ML
INJECTION, SOLUTION INTRAMUSCULAR; INTRAVENOUS CODE/TRAUMA/SEDATION MEDICATION
Status: COMPLETED | OUTPATIENT
Start: 2018-11-30 | End: 2018-11-30

## 2018-11-30 RX ORDER — FAMOTIDINE 20 MG/1
20 TABLET, FILM COATED ORAL EVERY 12 HOURS
Status: DISCONTINUED | OUTPATIENT
Start: 2018-11-30 | End: 2018-12-04 | Stop reason: DRUGHIGH

## 2018-11-30 RX ORDER — DEXAMETHASONE SODIUM PHOSPHATE 4 MG/ML
8 INJECTION, SOLUTION INTRA-ARTICULAR; INTRALESIONAL; INTRAMUSCULAR; INTRAVENOUS; SOFT TISSUE ONCE
Status: COMPLETED | OUTPATIENT
Start: 2018-11-30 | End: 2018-11-30

## 2018-11-30 RX ORDER — LIDOCAINE HYDROCHLORIDE 10 MG/ML
1 INJECTION, SOLUTION EPIDURAL; INFILTRATION; INTRACAUDAL; PERINEURAL ONCE
Status: DISCONTINUED | OUTPATIENT
Start: 2018-11-30 | End: 2018-11-30 | Stop reason: HOSPADM

## 2018-11-30 RX ORDER — HYDROMORPHONE HYDROCHLORIDE 2 MG/ML
0.5 INJECTION, SOLUTION INTRAMUSCULAR; INTRAVENOUS; SUBCUTANEOUS ONCE
Status: COMPLETED | OUTPATIENT
Start: 2018-11-30 | End: 2018-11-30

## 2018-11-30 RX ORDER — HYDRALAZINE HYDROCHLORIDE 20 MG/ML
INJECTION INTRAMUSCULAR; INTRAVENOUS CODE/TRAUMA/SEDATION MEDICATION
Status: COMPLETED | OUTPATIENT
Start: 2018-11-30 | End: 2018-11-30

## 2018-11-30 RX ORDER — HYDROMORPHONE HYDROCHLORIDE 2 MG/ML
INJECTION, SOLUTION INTRAMUSCULAR; INTRAVENOUS; SUBCUTANEOUS CODE/TRAUMA/SEDATION MEDICATION
Status: COMPLETED | OUTPATIENT
Start: 2018-11-30 | End: 2018-11-30

## 2018-11-30 RX ORDER — ONDANSETRON 2 MG/ML
4 INJECTION INTRAMUSCULAR; INTRAVENOUS EVERY 8 HOURS PRN
Status: DISCONTINUED | OUTPATIENT
Start: 2018-11-30 | End: 2018-12-04 | Stop reason: HOSPADM

## 2018-11-30 RX ORDER — HYDROMORPHONE HYDROCHLORIDE 2 MG/ML
0.2 INJECTION, SOLUTION INTRAMUSCULAR; INTRAVENOUS; SUBCUTANEOUS ONCE
Status: COMPLETED | OUTPATIENT
Start: 2018-11-30 | End: 2018-11-30

## 2018-11-30 RX ORDER — DEXTROSE MONOHYDRATE, SODIUM CHLORIDE, AND POTASSIUM CHLORIDE 50; 1.49; 4.5 G/1000ML; G/1000ML; G/1000ML
INJECTION, SOLUTION INTRAVENOUS CONTINUOUS
Status: DISCONTINUED | OUTPATIENT
Start: 2018-11-30 | End: 2018-12-01

## 2018-11-30 RX ORDER — MAGNESIUM SULFATE HEPTAHYDRATE 40 MG/ML
2 INJECTION, SOLUTION INTRAVENOUS ONCE
Status: COMPLETED | OUTPATIENT
Start: 2018-11-30 | End: 2018-11-30

## 2018-11-30 RX ADMIN — Medication 10 ML: at 02:11

## 2018-11-30 RX ADMIN — FAMOTIDINE 20 MG: 20 TABLET ORAL at 05:11

## 2018-11-30 RX ADMIN — HYDROMORPHONE HYDROCHLORIDE 1 MG: 2 INJECTION INTRAMUSCULAR; INTRAVENOUS; SUBCUTANEOUS at 02:11

## 2018-11-30 RX ADMIN — DIPHENHYDRAMINE HYDROCHLORIDE 50 MG: 50 INJECTION, SOLUTION INTRAMUSCULAR; INTRAVENOUS at 03:11

## 2018-11-30 RX ADMIN — ONDANSETRON 16 MG: 2 INJECTION INTRAMUSCULAR; INTRAVENOUS at 10:11

## 2018-11-30 RX ADMIN — OCTREOTIDE ACETATE 500 MCG/HR: 1000 INJECTION, SOLUTION INTRAVENOUS; SUBCUTANEOUS at 11:11

## 2018-11-30 RX ADMIN — HYDRALAZINE HYDROCHLORIDE 10 MG: 20 INJECTION INTRAMUSCULAR; INTRAVENOUS at 03:11

## 2018-11-30 RX ADMIN — ALLOPURINOL 300 MG: 300 TABLET ORAL at 05:11

## 2018-11-30 RX ADMIN — OCTREOTIDE ACETATE 500 MCG: 500 INJECTION, SOLUTION INTRAVENOUS; SUBCUTANEOUS at 10:11

## 2018-11-30 RX ADMIN — SODIUM CHLORIDE 3 G: 9 INJECTION, SOLUTION INTRAVENOUS at 10:11

## 2018-11-30 RX ADMIN — ONDANSETRON 4 MG: 2 INJECTION INTRAMUSCULAR; INTRAVENOUS at 09:11

## 2018-11-30 RX ADMIN — DEXAMETHASONE SODIUM PHOSPHATE 8 MG: 4 INJECTION, SOLUTION INTRAMUSCULAR; INTRAVENOUS at 05:11

## 2018-11-30 RX ADMIN — HYDROMORPHONE HYDROCHLORIDE 0.2 MG: 2 INJECTION INTRAMUSCULAR; INTRAVENOUS; SUBCUTANEOUS at 07:11

## 2018-11-30 RX ADMIN — AMPICILLIN SODIUM AND SULBACTAM SODIUM 3 G: 2; 1 INJECTION, POWDER, FOR SOLUTION INTRAMUSCULAR; INTRAVENOUS at 05:11

## 2018-11-30 RX ADMIN — FENTANYL CITRATE 50 MCG: 50 INJECTION, SOLUTION INTRAMUSCULAR; INTRAVENOUS at 02:11

## 2018-11-30 RX ADMIN — IOHEXOL 80 ML: 350 INJECTION, SOLUTION INTRAVENOUS at 03:11

## 2018-11-30 RX ADMIN — HYDRALAZINE HYDROCHLORIDE 10 MG: 20 INJECTION INTRAMUSCULAR; INTRAVENOUS at 07:11

## 2018-11-30 RX ADMIN — LIDOCAINE HYDROCHLORIDE 5 ML: 10 INJECTION, SOLUTION INFILTRATION; PERINEURAL at 02:11

## 2018-11-30 RX ADMIN — OXYCODONE HYDROCHLORIDE 30 MG: 5 TABLET ORAL at 09:11

## 2018-11-30 RX ADMIN — MIDAZOLAM HYDROCHLORIDE 1 MG: 1 INJECTION, SOLUTION INTRAMUSCULAR; INTRAVENOUS at 02:11

## 2018-11-30 RX ADMIN — Medication: at 11:11

## 2018-11-30 RX ADMIN — LORAZEPAM 0.5 MG: 0.5 TABLET ORAL at 08:11

## 2018-11-30 RX ADMIN — MAGNESIUM SULFATE IN WATER 2 G: 40 INJECTION, SOLUTION INTRAVENOUS at 10:11

## 2018-11-30 RX ADMIN — DEXTROSE, SODIUM CHLORIDE, AND POTASSIUM CHLORIDE: 5; .45; .15 INJECTION INTRAVENOUS at 10:11

## 2018-11-30 RX ADMIN — HYDROCORTISONE SODIUM SUCCINATE 200 MG: 100 INJECTION, POWDER, FOR SOLUTION INTRAMUSCULAR; INTRAVENOUS at 10:11

## 2018-11-30 RX ADMIN — SODIUM CHLORIDE 50 MG: 9 INJECTION, SOLUTION INTRAVENOUS at 02:11

## 2018-11-30 RX ADMIN — HYDROCORTISONE SODIUM SUCCINATE 200 MG: 100 INJECTION, POWDER, FOR SOLUTION INTRAMUSCULAR; INTRAVENOUS at 02:11

## 2018-11-30 RX ADMIN — METOPROLOL TARTRATE 50 MG: 50 TABLET ORAL at 08:11

## 2018-11-30 RX ADMIN — PROMETHAZINE HYDROCHLORIDE 12.5 MG: 25 INJECTION INTRAMUSCULAR; INTRAVENOUS at 02:11

## 2018-11-30 RX ADMIN — DIPHENHYDRAMINE HYDROCHLORIDE 50 MG: 50 INJECTION, SOLUTION INTRAMUSCULAR; INTRAVENOUS at 10:11

## 2018-11-30 RX ADMIN — OXYCODONE HYDROCHLORIDE AND ACETAMINOPHEN 1 TABLET: 5; 325 TABLET ORAL at 06:11

## 2018-11-30 RX ADMIN — FUROSEMIDE 20 MG: 10 INJECTION, SOLUTION INTRAMUSCULAR; INTRAVENOUS at 05:11

## 2018-11-30 RX ADMIN — HYDROMORPHONE HYDROCHLORIDE 0.5 MG: 2 INJECTION INTRAMUSCULAR; INTRAVENOUS; SUBCUTANEOUS at 09:11

## 2018-11-30 NOTE — PROCEDURES
Interventional Radiology Post-Procedure Note    Pre Op Diagnosis: Met NET  Post Op Diagnosis: Same    Procedure: Conventional TACE    Procedure performed by: Hannah    Written Informed Consent Obtained: Yes  Specimen Removed: No  Estimated Blood Loss: Less than 50     Findings:   Multifocal tumor blush throughout both hepatic lobes. Conventional chemoembolization of the right hepatic artery utilizing 30 mg aqueous doxorubicin in ~7 mL lipiodol followed by 4/5th vial 100-300 micron Embosphere Microspheres. Right CFA closed with Exoseal, but pt moving and anxious, so 20 min pressure held.    No immediate complications. Please see full dictated procedure report for additional details.      Jeremiah Young MD  [facility]  Pager [phone]

## 2018-11-30 NOTE — H&P
Interventional Radiology Pre-Procedure History & Physical      Chief Complaint/Reason for Referral: Met NET    History of Present Illness:  Patito Domingo is a 68 y.o. 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 Discussed in tumor board. TACE recommended. Seen by Dr. Perez yesterday. Allergic to contrast (hives, itching).    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)      Past Surgical History:   Procedure Laterality Date    CATARACT EXTRACTION Left 10/2017    CHOLECYSTECTOMY      CHOLECYSTECTOMY N/A 12/30/2017    Performed by Chris [facility] at [facility] OR    COLON SURGERY      cystoscope      EYE SURGERY      HYSTERECTOMY  5/1996    INSERTION-INTRAOCULAR LENS (IOL) Left 10/4/2017    Performed by Delmi Em MD at [facility] OR    LITHOTRIPSY      LIVER BIOPSY  9/14    carcinoid    PHACOEMULSIFICATION-ASPIRATION-CATARACT Left 10/4/2017    Performed by Delmi Em MD at [facility] OR       Allergies:   Review of patient's allergies indicates:   Allergen Reactions    Epinephrine Anaphylaxis     Can cause  a Carcinoid Crisis    Contrast media Hives, Itching and Swelling    Ibuprofen Hives, Itching and Swelling    Iodinated contrast- oral and iv dye     Sulfa (sulfonamide antibiotics) Hives, Itching and Swelling        Home Meds:   Prior to Admission medications    Medication Sig Start Date End Date Taking? Authorizing Provider   bumetanide (BUMEX) 0.5 MG Tab Take 1 tablet (0.5 mg total) by mouth once daily.  Patient taking differently: Take 0.5 mg by mouth daily as needed.  9/19/18 9/19/19 Yes Magdalena Hernandez MD   ferrous gluconate 324 mg (36 mg iron) Tab Take 1 tablet by mouth once daily. 8/20/18  Yes Magdalena Hernandez MD   gabapentin (NEURONTIN) 100 MG  capsule takes one capsule BID 9/19/18  Yes Magdalena Hernandez MD   iron-vit c-vit b12-folic acid (IRON-C PLUS) tablet Take 1 tablet by mouth once daily. 11/29/18  Yes Chris Herbert MD   lipase-protease-amylase 12,000-38,000-60,000 units (CREON) CpDR Take 1 capsule by mouth 3 (three) times daily with meals. 10/18/18 10/18/19 Yes Chris Herbert MD   losartan (COZAAR) 50 MG tablet TAKE 1 TABLET BY MOUTH DAILY 9/8/18  Yes Magdalena Hernandez MD   magnesium oxide (MAG-OX) 400 mg tablet Take 1 tablet (400 mg total) by mouth once daily. 9/19/18  Yes Magdalena Hernandez MD   metoprolol tartrate (LOPRESSOR) 50 MG tablet Take 1 tablet (50 mg total) by mouth 2 (two) times daily. 9/19/18  Yes Magdalena Hernandez MD   ciprofloxacin HCl (CIPRO) 500 MG tablet Take 1 tablet (500 mg total) by mouth 2 (two) times daily. 11/29/18   Chris Herbert MD   diphenoxylate-atropine 2.5-0.025 mg (LOMOTIL) 2.5-0.025 mg per tablet TAKE 1 TABLET BY MOUTH 4 TIMES A DAY AS NEEDED FOR DIARRHEA 11/14/18   Magdalena Hernandez MD   lanreotide (SOMATULINE DEPOT) 120 mg/0.5 mL Syrg Inject 120 mg into the skin every 28 days.    Historical Provider, MD   nystatin (MYCOSTATIN) 100,000 unit/mL suspension SWISH AND SWALLOW 1 TEASPOON BY MOUTH 4 TIMES A DAY. ONCE RELIEVED CONTINUE FOR 48 HOURS  Patient taking differently: as needed. SWISH AND SWALLOW 1 TEASPOON BY MOUTH 4 TIMES A DAY. ONCE RELIEVED CONTINUE FOR 48 HOURS 9/19/18   Magdalena Hernandez MD   oxyCODONE (ROXICODONE) 30 MG Tab Take 30 mg by mouth every 4 (four) hours as needed.  1/25/18   Historical Provider, MD   promethazine (PHENERGAN) 25 MG tablet Take 1 tablet (25 mg total) by mouth every 6 (six) hours as needed for Nausea. 10/30/18   Carol Mcallister NP   promethazine (PHENERGAN) 25 MG tablet Take 1 tablet (25 mg total) by mouth every 6 (six) hours as needed for Nausea. 11/29/18   Chris Herbert MD   triamcinolone acetonide 0.1% (KENALOG) 0.1 %  ointment Apply topically 2 (two) times daily. for 10 days 9/19/18 9/29/18  Magdalena Hernandez MD   walker (ULTRA-LIGHT ROLLATOR) Misc 1 Units by Misc.(Non-Drug; Combo Route) route once daily. 9/19/18   Magdalena Hernandez MD       Anticoagulation/Antiplatelet Meds: no anticoagulation    Review of Systems:   Hematological: no known coagulopathies  Respiratory: no shortness of breath  Cardiovascular: no chest pain  Gastrointestinal: no abdominal pain  Genitourinary: no dysuria  Musculoskeletal: negative  Neurological: no TIA or stroke symptoms     Physical Exam:  Temp: 98.5 °F (36.9 °C) (11/30/18 0954)  Pulse: 85 (11/30/18 0954)  Resp: 18 (11/30/18 0954)  BP: (!) 205/94 (11/30/18 0956)  SpO2: 99 % (11/30/18 0954)    General: NAD  HEENT: Normocephalic, sclera anicteric, oropharynx clear  Neck: Supple, no palpable lymphadenopathy  Heart: RRR  Lungs: Symmetric excursions, breathing unlabored  Abd: NTND, soft  Extremities: CARO  Pulses: 2+ DP b/l  Neuro: Gross nonfocal    Laboratory:  Lab Results   Component Value Date    INR 0.9 11/29/2018       Lab Results   Component Value Date    WBC 4.53 11/29/2018    HGB 7.5 (L) 11/29/2018    HCT 25.4 (L) 11/29/2018    MCV 89 11/29/2018     11/29/2018      Lab Results   Component Value Date     11/29/2018     11/29/2018    K 3.9 11/29/2018     11/29/2018    CO2 29 11/29/2018    BUN 13 11/29/2018    CREATININE 1.1 11/29/2018    CALCIUM 8.9 11/29/2018    MG 1.3 (L) 08/23/2018    ALT <5 (L) 11/29/2018    AST 10 11/29/2018    ALBUMIN 2.7 (L) 11/29/2018    BILITOT 0.2 11/29/2018       Imaging:  MRI abd and Octreoscan 10/4/18, CT 8/23/18 were reviewed.    Assessment/Plan:  68 y.o. female with met NET for conventional TACE today. Plan to treat the right lobe today, left lobe in the future, pending tolerance of this procedure. Admit following per Dr. Perez.    Sedation:  Sedation history: have not been any systemic reactions  ASA: 3 / Mallampati:  2  Sedation plan: Moderate (Versed, fentanyl)     Risks (including, but not limited to, pain, bleeding, infection, damage to nearby structures, liver failure, treatment failure, and the need for additional procedures), benefits, and alternatives were discussed with the patient. All questions were answered to the best of my abilities. The patient wishes to proceed with transarterial chemoembolization. Written informed consent was obtained.       Jeremiah Young MD  [facility]  Pager [phone]

## 2018-11-30 NOTE — NURSING
Pt bedrest complete Right groin soft, dry, and intact, no bleeding or hematoma. Bilateral pulses +2. Pt ready for transport to room.  Report called to nurse on 5A PANCHITO Desai pt transported by 2 RNs

## 2018-12-01 PROCEDURE — 94761 N-INVAS EAR/PLS OXIMETRY MLT: CPT

## 2018-12-01 PROCEDURE — 63600175 PHARM REV CODE 636 W HCPCS: Performed by: SURGERY

## 2018-12-01 PROCEDURE — 63600175 PHARM REV CODE 636 W HCPCS: Performed by: STUDENT IN AN ORGANIZED HEALTH CARE EDUCATION/TRAINING PROGRAM

## 2018-12-01 PROCEDURE — 25000003 PHARM REV CODE 250: Performed by: STUDENT IN AN ORGANIZED HEALTH CARE EDUCATION/TRAINING PROGRAM

## 2018-12-01 PROCEDURE — 94770 HC EXHALED C02 TEST: CPT

## 2018-12-01 PROCEDURE — 11000001 HC ACUTE MED/SURG PRIVATE ROOM

## 2018-12-01 PROCEDURE — 25000003 PHARM REV CODE 250: Performed by: SURGERY

## 2018-12-01 PROCEDURE — 99900035 HC TECH TIME PER 15 MIN (STAT)

## 2018-12-01 PROCEDURE — 27000221 HC OXYGEN, UP TO 24 HOURS

## 2018-12-01 RX ORDER — METOPROLOL TARTRATE 25 MG/1
25 TABLET, FILM COATED ORAL ONCE
Status: DISCONTINUED | OUTPATIENT
Start: 2018-12-01 | End: 2018-12-01

## 2018-12-01 RX ORDER — METOPROLOL SUCCINATE 25 MG/1
50 TABLET, EXTENDED RELEASE ORAL ONCE
Status: DISCONTINUED | OUTPATIENT
Start: 2018-12-01 | End: 2018-12-01

## 2018-12-01 RX ORDER — METOPROLOL TARTRATE 50 MG/1
50 TABLET ORAL 2 TIMES DAILY
Status: DISCONTINUED | OUTPATIENT
Start: 2018-12-01 | End: 2018-12-01

## 2018-12-01 RX ORDER — METOPROLOL TARTRATE 50 MG/1
50 TABLET ORAL ONCE
Status: COMPLETED | OUTPATIENT
Start: 2018-12-01 | End: 2018-12-01

## 2018-12-01 RX ORDER — LOSARTAN POTASSIUM 50 MG/1
50 TABLET ORAL 2 TIMES DAILY
Status: DISCONTINUED | OUTPATIENT
Start: 2018-12-01 | End: 2018-12-04 | Stop reason: HOSPADM

## 2018-12-01 RX ORDER — LABETALOL HCL 20 MG/4 ML
10 SYRINGE (ML) INTRAVENOUS ONCE
Status: COMPLETED | OUTPATIENT
Start: 2018-12-01 | End: 2018-12-01

## 2018-12-01 RX ORDER — LABETALOL HCL 20 MG/4 ML
10 SYRINGE (ML) INTRAVENOUS EVERY 4 HOURS PRN
Status: DISCONTINUED | OUTPATIENT
Start: 2018-12-01 | End: 2018-12-04 | Stop reason: HOSPADM

## 2018-12-01 RX ORDER — METOPROLOL TARTRATE 50 MG/1
50 TABLET ORAL 2 TIMES DAILY
Status: DISCONTINUED | OUTPATIENT
Start: 2018-12-01 | End: 2018-12-04 | Stop reason: HOSPADM

## 2018-12-01 RX ORDER — BUMETANIDE 0.5 MG/1
0.5 TABLET ORAL DAILY
Status: DISCONTINUED | OUTPATIENT
Start: 2018-12-01 | End: 2018-12-04 | Stop reason: HOSPADM

## 2018-12-01 RX ORDER — HYDROCHLOROTHIAZIDE 25 MG/1
25 TABLET ORAL DAILY
Status: DISCONTINUED | OUTPATIENT
Start: 2018-12-01 | End: 2018-12-02

## 2018-12-01 RX ORDER — LOSARTAN POTASSIUM 50 MG/1
50 TABLET ORAL ONCE
Status: COMPLETED | OUTPATIENT
Start: 2018-12-01 | End: 2018-12-01

## 2018-12-01 RX ORDER — LOSARTAN POTASSIUM 50 MG/1
50 TABLET ORAL DAILY
Status: DISCONTINUED | OUTPATIENT
Start: 2018-12-02 | End: 2018-12-01

## 2018-12-01 RX ORDER — OXYCODONE HYDROCHLORIDE 5 MG/1
15 TABLET ORAL EVERY 6 HOURS PRN
Status: DISCONTINUED | OUTPATIENT
Start: 2018-12-01 | End: 2018-12-01

## 2018-12-01 RX ORDER — AMLODIPINE BESYLATE 5 MG/1
10 TABLET ORAL DAILY
Status: DISCONTINUED | OUTPATIENT
Start: 2018-12-01 | End: 2018-12-02

## 2018-12-01 RX ORDER — GABAPENTIN 100 MG/1
100 CAPSULE ORAL 2 TIMES DAILY
Status: DISCONTINUED | OUTPATIENT
Start: 2018-12-01 | End: 2018-12-04 | Stop reason: HOSPADM

## 2018-12-01 RX ADMIN — METOPROLOL TARTRATE 50 MG: 50 TABLET ORAL at 06:12

## 2018-12-01 RX ADMIN — HYDRALAZINE HYDROCHLORIDE 10 MG: 20 INJECTION INTRAMUSCULAR; INTRAVENOUS at 03:12

## 2018-12-01 RX ADMIN — DEXTROSE, SODIUM CHLORIDE, AND POTASSIUM CHLORIDE 125 ML/HR: 5; .45; .15 INJECTION INTRAVENOUS at 11:12

## 2018-12-01 RX ADMIN — LOSARTAN POTASSIUM 50 MG: 50 TABLET, FILM COATED ORAL at 08:12

## 2018-12-01 RX ADMIN — FAMOTIDINE 20 MG: 20 TABLET ORAL at 09:12

## 2018-12-01 RX ADMIN — LOSARTAN POTASSIUM 50 MG: 50 TABLET, FILM COATED ORAL at 06:12

## 2018-12-01 RX ADMIN — OXYCODONE HYDROCHLORIDE 30 MG: 5 TABLET ORAL at 04:12

## 2018-12-01 RX ADMIN — LABETALOL HYDROCHLORIDE 10 MG: 5 INJECTION, SOLUTION INTRAVENOUS at 12:12

## 2018-12-01 RX ADMIN — ALLOPURINOL 300 MG: 300 TABLET ORAL at 09:12

## 2018-12-01 RX ADMIN — ONDANSETRON 4 MG: 2 INJECTION INTRAMUSCULAR; INTRAVENOUS at 06:12

## 2018-12-01 RX ADMIN — GABAPENTIN 100 MG: 100 CAPSULE ORAL at 08:12

## 2018-12-01 RX ADMIN — AMPICILLIN SODIUM AND SULBACTAM SODIUM 3 G: 2; 1 INJECTION, POWDER, FOR SOLUTION INTRAMUSCULAR; INTRAVENOUS at 06:12

## 2018-12-01 RX ADMIN — DEXTROSE, SODIUM CHLORIDE, AND POTASSIUM CHLORIDE: 5; .45; .15 INJECTION INTRAVENOUS at 02:12

## 2018-12-01 RX ADMIN — BUMETANIDE 0.5 MG: 0.5 TABLET ORAL at 10:12

## 2018-12-01 RX ADMIN — DEXTROSE, SODIUM CHLORIDE, AND POTASSIUM CHLORIDE: 5; .45; .15 INJECTION INTRAVENOUS at 09:12

## 2018-12-01 RX ADMIN — GABAPENTIN 100 MG: 100 CAPSULE ORAL at 10:12

## 2018-12-01 RX ADMIN — METOPROLOL TARTRATE 50 MG: 50 TABLET ORAL at 08:12

## 2018-12-01 RX ADMIN — FAMOTIDINE 20 MG: 20 TABLET ORAL at 08:12

## 2018-12-01 RX ADMIN — LABETALOL HYDROCHLORIDE 10 MG: 5 INJECTION, SOLUTION INTRAVENOUS at 08:12

## 2018-12-01 RX ADMIN — AMPICILLIN SODIUM AND SULBACTAM SODIUM 3 G: 2; 1 INJECTION, POWDER, FOR SOLUTION INTRAMUSCULAR; INTRAVENOUS at 12:12

## 2018-12-01 RX ADMIN — HYDROCHLOROTHIAZIDE 25 MG: 25 TABLET ORAL at 08:12

## 2018-12-01 RX ADMIN — AMLODIPINE BESYLATE 10 MG: 5 TABLET ORAL at 10:12

## 2018-12-01 NOTE — PLAN OF CARE
Problem: Patient Care Overview  Goal: Plan of Care Review  Outcome: Ongoing (interventions implemented as appropriate)  Plan of care reviewed with patient. Patient is slightly drowsy  falls asleep while talking. While assessing for pain, she says 8/10. PCA with Hydromorphone in progress. Has used  6 times (1.2 mg) since started on PCA (23:37 hrs) PRN Oxycodone 30 mg given with good effect. Patient prefers Oxycodone than PCA. IV antiemetics given for nausea and vomiting. Right groin dressing dry and intact. BP has been elevated, Systolic 150 - 220 mm of Hg throughout night in spite of IV Hydralazine twice as well as oral anti hypertensive medicine. Safety measures maintained. Slept well during night. Call bell in hand. Will continue to monitor patient.

## 2018-12-01 NOTE — NURSING
Arrived from interventional radiology   Immediately went to bathroom   Loss iv site   Vitals taken  Pt stated in pain  Blood pressure elevated will repeat when pt settles new iv placed to right a/c per radiology nursing staff  Pt loss dressing to right groin with activity up to bathroom  Puncture site cleansed with prevantics wipe and covered with bandaid  Pedal pulses present bilaterally

## 2018-12-01 NOTE — PROGRESS NOTES
Introduced as VN for night shift that will be working with floor nurse and nursing assistant.  Educated patient on VN's role in patient care. Plan of care reviewed with patient. Education per flowsheet.  Opportunity given for questions and questions answered.  Instructed to call for assistance.  Will cont to monitor.

## 2018-12-01 NOTE — NURSING
Pt has been sedated and sleeping /snoring all day. Is using PCA pump and is now asking for additional po pain medication. Has not been awake to eat clear liquid trays. Has swallowed po medications without any problem. Dr Patricia phoned to report pt very sedated all day and asking for po pain med. PRN po medication reduced and made prn for night shift only. Pt was informed to continue to use her PCA pump as advised by MD. Pt then asked for nausea medication stating that she has been throwing up all day. This has not been witnessed by RN or PCT. Pt was not awake enough to eat clear liquid trays at breakfast and lunch. I reminded pt that Staff has not observed any N/V and if she does vomit to use blue bag so we can see it.

## 2018-12-01 NOTE — PLAN OF CARE
Problem: Patient Care Overview  Goal: Plan of Care Review  Outcome: Ongoing (interventions implemented as appropriate)  Pt pain continues at 6   Blood pressure remains elevated  Call placed to surgical team for additional orders   Will medicate as new orders are attained

## 2018-12-01 NOTE — PROGRESS NOTES
[facility] - Neuroendocrine Surgery Service  Daily Progress Note     HD#1   S/p TACE    Subjective  Pain poorly controlled overnight  Started on PCA with improvement.  Takes large dose of chronic narcotics at home  HTN overnight     Scheduled Meds:   amLODIPine  10 mg Oral Daily    ampicillin-sulbactim (UNASYN) IVPB  3 g Intravenous Q6H    bumetanide  0.5 mg Oral Daily    famotidine  20 mg Oral Q12H    [START ON 12/2/2018] losartan  50 mg Oral Daily    metoprolol tartrate  50 mg Oral BID       Continuous Infusions:   dextrose 5 % and 0.45 % NaCl with KCl 20 mEq 125 mL/hr at 12/01/18 0242    hydromorphone in 0.9 % NaCl 6 mg/30 ml      octreotide infusion (50 mcg/mL) 125 mcg/hr (11/30/18 1700)       PRN Meds:hydrALAZINE, labetalol, LORazepam, naloxone, ondansetron, oxyCODONE     Objective  Temp:  [97.4 °F (36.3 °C)-98.8 °F (37.1 °C)] 98.8 °F (37.1 °C)  Pulse:  [] 90  Resp:  [16-24] 20  SpO2:  [96 %-100 %] 100 %  BP: (150-224)/() 180/77     I/O last 3 completed shifts:  In: 1143.2 [I.V.:943.2; IV Piggyback:200]  Out: 2375 [Urine:2375]  I/O this shift:  In: -   Out: 150 [Urine:150]     GEN: NAD  NEURO: awake and alert  RESP: Unlabored  CV: RRR  ABD: Soft,  Moderate RUQ tenderness, no peritonitis     Labs  Recent Labs     11/29/18  1321   WBC 4.53   HGB 7.5*   HCT 25.4*      INR 0.9     Recent Labs     11/29/18  1321      K 3.9      CO2 29   BUN 13   CREATININE 1.1      CALCIUM 8.9   PROT 6.4   ALBUMIN 2.7*   BILITOT 0.2   AST 10   ALKPHOS 74   ALT <5*        Assessment/Plan  67yo F s/p TACE for mNET,  Also with chronic pain, on high dose narcotics outpt as well as poorly controled HTN    -add amlodipine to HTN regimen  -cont PCA, restart gabapentin  -ambulate  -advance diet as tolerated  -Needs pain and nausea control prior to d/c     Danny Patricia MD, PGY4  [facility]  cell: [phone]    12/1/2018  9:32 AM

## 2018-12-01 NOTE — PLAN OF CARE
Problem: Patient Care Overview  Goal: Plan of Care Review  Outcome: Ongoing (interventions implemented as appropriate)  Pt received on nasal cannula at  2  lpm.  SPO2  100 %.  Pt in no apparent respiratory distress.  Will continue to monitor.

## 2018-12-01 NOTE — PROGRESS NOTES
Spoke with Dr. Patricia about patient's continued pain after receiving dilaudid 0.2mg iv and dilaudid pca getting set up now; confirmed home medication of oxycodone (roxycodone) 30mg po every 4 hours prn pain with patient.  New orders received from Dr. Patricia.  Updated Ellen FLANAGAN

## 2018-12-01 NOTE — PROGRESS NOTES
Dr. Patricia notified of current vitals; new orders received.     12/01/18 0622   Vital Signs   Heart Rate Source Monitor   Resp 16   SpO2 100 %   Pulse Oximetry Type Intermittent   Flow (L/min) 2   BP (!) 205/92   MAP (mmHg) 132   BP Location Left arm   BP Method Automatic   Patient Position Lying

## 2018-12-02 PROBLEM — G89.4 CHRONIC PAIN SYNDROME: Status: ACTIVE | Noted: 2018-12-02

## 2018-12-02 LAB
ALBUMIN SERPL BCP-MCNC: 2.8 G/DL
ALP SERPL-CCNC: 96 U/L
ALT SERPL W/O P-5'-P-CCNC: 16 U/L
ANION GAP SERPL CALC-SCNC: 10 MMOL/L
ANISOCYTOSIS BLD QL SMEAR: SLIGHT
AST SERPL-CCNC: 59 U/L
BASOPHILS # BLD AUTO: ABNORMAL K/UL
BASOPHILS NFR BLD: 0 %
BILIRUB SERPL-MCNC: 0.6 MG/DL
BUN SERPL-MCNC: 13 MG/DL
CALCIUM SERPL-MCNC: 9 MG/DL
CHLORIDE SERPL-SCNC: 96 MMOL/L
CO2 SERPL-SCNC: 27 MMOL/L
CREAT SERPL-MCNC: 0.9 MG/DL
DACRYOCYTES BLD QL SMEAR: ABNORMAL
DIFFERENTIAL METHOD: ABNORMAL
EOSINOPHIL # BLD AUTO: ABNORMAL K/UL
EOSINOPHIL NFR BLD: 0 %
ERYTHROCYTE [DISTWIDTH] IN BLOOD BY AUTOMATED COUNT: 15.7 %
EST. GFR  (AFRICAN AMERICAN): >60 ML/MIN/1.73 M^2
EST. GFR  (NON AFRICAN AMERICAN): >60 ML/MIN/1.73 M^2
GLUCOSE SERPL-MCNC: 181 MG/DL
HCT VFR BLD AUTO: 28.9 %
HGB BLD-MCNC: 8.9 G/DL
HYPOCHROMIA BLD QL SMEAR: ABNORMAL
LYMPHOCYTES # BLD AUTO: ABNORMAL K/UL
LYMPHOCYTES NFR BLD: 7 %
MAGNESIUM SERPL-MCNC: 1.6 MG/DL
MCH RBC QN AUTO: 26.7 PG
MCHC RBC AUTO-ENTMCNC: 30.8 G/DL
MCV RBC AUTO: 87 FL
MONOCYTES # BLD AUTO: ABNORMAL K/UL
MONOCYTES NFR BLD: 10 %
NEUTROPHILS NFR BLD: 81 %
NEUTS BAND NFR BLD MANUAL: 2 %
OVALOCYTES BLD QL SMEAR: ABNORMAL
PHOSPHATE SERPL-MCNC: 1.9 MG/DL
PLATELET # BLD AUTO: 213 K/UL
PLATELET BLD QL SMEAR: ABNORMAL
PMV BLD AUTO: 10.9 FL
POIKILOCYTOSIS BLD QL SMEAR: SLIGHT
POTASSIUM SERPL-SCNC: 4.2 MMOL/L
PROT SERPL-MCNC: 7.1 G/DL
RBC # BLD AUTO: 3.33 M/UL
SODIUM SERPL-SCNC: 133 MMOL/L
WBC # BLD AUTO: 14.03 K/UL

## 2018-12-02 PROCEDURE — 25000003 PHARM REV CODE 250: Performed by: STUDENT IN AN ORGANIZED HEALTH CARE EDUCATION/TRAINING PROGRAM

## 2018-12-02 PROCEDURE — 94761 N-INVAS EAR/PLS OXIMETRY MLT: CPT

## 2018-12-02 PROCEDURE — 85007 BL SMEAR W/DIFF WBC COUNT: CPT

## 2018-12-02 PROCEDURE — 83735 ASSAY OF MAGNESIUM: CPT

## 2018-12-02 PROCEDURE — 80053 COMPREHEN METABOLIC PANEL: CPT

## 2018-12-02 PROCEDURE — 11000001 HC ACUTE MED/SURG PRIVATE ROOM

## 2018-12-02 PROCEDURE — 85027 COMPLETE CBC AUTOMATED: CPT

## 2018-12-02 PROCEDURE — 84100 ASSAY OF PHOSPHORUS: CPT

## 2018-12-02 PROCEDURE — 25000003 PHARM REV CODE 250: Performed by: SURGERY

## 2018-12-02 PROCEDURE — 36415 COLL VENOUS BLD VENIPUNCTURE: CPT

## 2018-12-02 PROCEDURE — 63600175 PHARM REV CODE 636 W HCPCS: Performed by: STUDENT IN AN ORGANIZED HEALTH CARE EDUCATION/TRAINING PROGRAM

## 2018-12-02 RX ORDER — CHLORTHALIDONE 25 MG/1
25 TABLET ORAL DAILY
Status: DISCONTINUED | OUTPATIENT
Start: 2018-12-03 | End: 2018-12-04 | Stop reason: HOSPADM

## 2018-12-02 RX ORDER — ENOXAPARIN SODIUM 100 MG/ML
40 INJECTION SUBCUTANEOUS
Status: DISCONTINUED | OUTPATIENT
Start: 2018-12-02 | End: 2018-12-04 | Stop reason: DRUGHIGH

## 2018-12-02 RX ORDER — NIFEDIPINE 30 MG/1
60 TABLET, EXTENDED RELEASE ORAL DAILY
Status: DISCONTINUED | OUTPATIENT
Start: 2018-12-02 | End: 2018-12-04 | Stop reason: HOSPADM

## 2018-12-02 RX ADMIN — BUMETANIDE 0.5 MG: 0.5 TABLET ORAL at 09:12

## 2018-12-02 RX ADMIN — LOSARTAN POTASSIUM 50 MG: 50 TABLET, FILM COATED ORAL at 08:12

## 2018-12-02 RX ADMIN — LABETALOL HYDROCHLORIDE 10 MG: 5 INJECTION, SOLUTION INTRAVENOUS at 12:12

## 2018-12-02 RX ADMIN — GABAPENTIN 100 MG: 100 CAPSULE ORAL at 09:12

## 2018-12-02 RX ADMIN — METOPROLOL TARTRATE 50 MG: 50 TABLET ORAL at 09:12

## 2018-12-02 RX ADMIN — FAMOTIDINE 20 MG: 20 TABLET ORAL at 08:12

## 2018-12-02 RX ADMIN — NIFEDIPINE 60 MG: 30 TABLET, FILM COATED, EXTENDED RELEASE ORAL at 05:12

## 2018-12-02 RX ADMIN — GABAPENTIN 100 MG: 100 CAPSULE ORAL at 08:12

## 2018-12-02 RX ADMIN — AMLODIPINE BESYLATE 10 MG: 5 TABLET ORAL at 09:12

## 2018-12-02 RX ADMIN — ENOXAPARIN SODIUM 40 MG: 100 INJECTION SUBCUTANEOUS at 09:12

## 2018-12-02 RX ADMIN — DEXTROSE, SODIUM CHLORIDE, AND POTASSIUM CHLORIDE: 5; .45; .15 INJECTION INTRAVENOUS at 04:12

## 2018-12-02 RX ADMIN — HYDROCHLOROTHIAZIDE 25 MG: 25 TABLET ORAL at 09:12

## 2018-12-02 RX ADMIN — METOPROLOL TARTRATE 50 MG: 50 TABLET ORAL at 08:12

## 2018-12-02 RX ADMIN — LOSARTAN POTASSIUM 50 MG: 50 TABLET, FILM COATED ORAL at 09:12

## 2018-12-02 RX ADMIN — FAMOTIDINE 20 MG: 20 TABLET ORAL at 09:12

## 2018-12-02 NOTE — PROGRESS NOTES
Notified Dr. Patricia that patient's BP is going back up in the 190's systolic and her temperature is 100.6, stated to encourage IS and order morning labs. Will continue to monitor.

## 2018-12-02 NOTE — NURSING
"Pt has purse in bed with her and has Rx Lomotil in it. Stated that she needed to take it for diarrhea. Pt not observed yesterday or last night to have diarrhea. Purse placed in closet  And instructed to have her family take it home or will be locked up in Security. PCA pump discontinued. Pt had trouble bringing med cup to mouth to take meds " due to her being too sleepy".  "

## 2018-12-02 NOTE — PROGRESS NOTES
Spoke with Dr. Patricia regarding patients elevated BP and continued drowsiness, orders received. Will continue to monitor.

## 2018-12-02 NOTE — PLAN OF CARE
Problem: Patient Care Overview  Goal: Plan of Care Review  Outcome: Ongoing (interventions implemented as appropriate)  Patient resting in bed, AAOx4. IVF infusing as ordered. Medications administered as ordered. Patient using dilaudid PCA through the night for pain. Patient very drowsy but easily aroused. Blood pressure very elevated through the night post scheduled and PRN medications, MD aware. Telemetry on, NSR. Encouraged to call with needs or concerns. Will continue to monitor.

## 2018-12-02 NOTE — NURSING
Patient B/P 194/84.  Dr. Patricia notified.  No new orders at this time.  Will continue to monitor.

## 2018-12-02 NOTE — CONSULTS
Subjective    Chief Complaint: HTN consult     History of Present Illness:  Patient is a 68 y.o. 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 She takes losartan and lopressor only for her bp at home and her blood pressure have been elevated during her admission. HCTZ and amlodipine were added and her blood pressure was still elevated. She denies headache, blurry vision, vomiting and chest pain. Her bp at home is around 130-140 systolic. She is in significant pain currently. She has significant nausea and pain and thus is being held for pain management per surgery team.       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)      Past Surgical History:   Procedure Laterality Date    CATARACT EXTRACTION Left 10/2017    CHOLECYSTECTOMY      CHOLECYSTECTOMY N/A 12/30/2017    [facility] by [facility] at [facility]    COLON SURGERY      cystoscope      EYE SURGERY      HYSTERECTOMY  5/1996    INSERTION-INTRAOCULAR LENS (IOL) Left 10/4/2017    [facility]    LITHOTRIPSY      LIVER BIOPSY  9/14    carcinoid    PHACOEMULSIFICATION-ASPIRATION-CATARACT Left 10/4/2017    [facility]     Family History   Problem Relation Age of Onset    Cancer Mother         unknown    Alzheimer's disease  Father     Stroke Sister     No Known Problems Son     No Known Problems Son     No Known Problems Son     No Known Problems Son     Kidney disease Neg Hx      Social History     Tobacco Use    Smoking status: Never Smoker    Smokeless tobacco: Never Used   Substance Use Topics    Alcohol use: No    Drug use: No      No current facility-administered medications on file prior to encounter.      Current Outpatient Medications on File Prior to Encounter   Medication Sig    bumetanide (BUMEX) 0.5 MG Tab Take 1 tablet (0.5 mg total) by mouth once daily. (Patient taking differently: Take 0.5 mg by mouth daily as needed. )    ferrous gluconate 324 mg (36 mg iron) Tab Take 1 tablet by mouth once daily.    gabapentin (NEURONTIN) 100 MG capsule takes one capsule BID    lipase-protease-amylase 12,000-38,000-60,000 units (CREON) CpDR Take 1 capsule by mouth 3 (three) times daily with meals.    losartan (COZAAR) 50 MG tablet TAKE 1 TABLET BY MOUTH DAILY    magnesium oxide (MAG-OX) 400 mg tablet Take 1 tablet (400 mg total) by mouth once daily.    metoprolol tartrate (LOPRESSOR) 50 MG tablet Take 1 tablet (50 mg total) by mouth 2 (two) times daily.    lanreotide (SOMATULINE DEPOT) 120 mg/0.5 mL Syrg Inject 120 mg into the skin every 28 days.    nystatin (MYCOSTATIN) 100,000 unit/mL suspension SWISH AND SWALLOW 1 TEASPOON BY MOUTH 4 TIMES A DAY. ONCE RELIEVED CONTINUE FOR 48 HOURS (Patient taking differently: as needed. SWISH AND SWALLOW 1 TEASPOON BY MOUTH 4 TIMES A DAY. ONCE RELIEVED CONTINUE FOR 48 HOURS)    oxyCODONE (ROXICODONE) 30 MG Tab Take 30 mg by mouth every 4 (four) hours as needed.     triamcinolone acetonide 0.1% (KENALOG) 0.1 % ointment Apply topically 2 (two) times daily. for 10 days    walker (ULTRA-LIGHT ROLLATOR) Misc 1 Units by Misc.(Non-Drug; Combo Route) route once daily.       Review of Systems (Negative unless checked off)    Constitutional:  [] fever,[] weight loss, [x]  malaise/fatigue, [] night sweats.   HENT:   [] congestion, [] sore throat, [] neck pain,   Eyes: [] blurred vision, []  double vision, [] photophobia,   Respiratory: [] cough, [] pleuritic chest pain,[] sputum production, [] shortness of breath,   Cardiovascular:  [] chest pain, [] palpitations,  Gastrointestinal:[x] nausea, [] vomiting, [x] abdominal pain, [] diarrhea, [] constipation, [] blood in stool, [] melena.   Genitourinary:  [] dysuria, [] urgency, [] frequency, [] hematuria, []   flank pain.   Musculoskeletal: [] myalgias, [] back pain, [] joint pain, []  falls.   Skin:[]  rash.   Neurological:  [] dizziness, [] tingling, [] tremors, [] sensory change, [] speech change, []  focal weakness, [] seizures, [] loss of consciousness, [] weakness  [] headaches.   Psychiatric/Behavioral:[] hallucinations, [] memory loss, []  substance abuse.        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)        Past Surgical History:   Procedure Laterality Date    CATARACT EXTRACTION Left 10/2017    CHOLECYSTECTOMY      CHOLECYSTECTOMY N/A 12/30/2017    Performed by [facility] at [facility] OR    COLON SURGERY      cystoscope      EYE SURGERY      HYSTERECTOMY  5/1996    INSERTION-INTRAOCULAR LENS (IOL) Left 10/4/2017    [facility] at [facility]    LITHOTRIPSY      LIVER BIOPSY  9/14    carcinoid    PHACOEMULSIFICATION-ASPIRATION-CATARACT Left 10/4/2017    Performed by [facility]mi [facility] at [facility] OR       Family History   Problem Relation Age of Onset    Cancer Mother         unknown    Alzheimer's disease Father     Stroke Sister     No Known Problems Son     No Known Problems Son     No Known Problems Son     No Known Problems Son     Kidney disease Neg Hx        Social  History     Socioeconomic History    Marital status:      Spouse name: None    Number of children: None    Years of education: None    Highest education level: None   Social Needs    Financial resource strain: None    Food insecurity - worry: None    Food insecurity - inability: None    Transportation needs - medical: None    Transportation needs - non-medical: None   Occupational History    Occupation: disabled    Tobacco Use    Smoking status: Never Smoker    Smokeless tobacco: Never Used   Substance and Sexual Activity    Alcohol use: No    Drug use: No    Sexual activity: Not Currently   Other Topics Concern    None   Social History Narrative    None       Current Facility-Administered Medications   Medication Dose Route Frequency Provider Last Rate Last Dose    bumetanide tablet 0.5 mg  0.5 mg Oral Daily Cristian Patricia MD   0.5 mg at 12/02/18 0910    [START ON 12/3/2018] chlorthalidone tablet 25 mg  25 mg Oral Daily Galdino Benitez MD        dextrose 5 % and 0.45 % NaCl with KCl 20 mEq infusion   Intravenous Continuous Cristian Patricia MD 50 mL/hr at 12/02/18 0840      enoxaparin injection 40 mg  40 mg Subcutaneous Q24H Cristian Patricia MD   40 mg at 12/02/18 0914    famotidine tablet 20 mg  20 mg Oral Q12H Chris Herbert MD   20 mg at 12/02/18 0910    gabapentin capsule 100 mg  100 mg Oral BID Cristian Patricia MD   100 mg at 12/02/18 0910    labetalol 20 mg/4 mL (5 mg/mL) IV syring  10 mg Intravenous Q4H PRN Cristian Patricia MD   10 mg at 12/02/18 0037    LORazepam tablet 0.5 mg  0.5 mg Oral Q8H PRN Chris Herbert MD   0.5 mg at 11/30/18 2033    losartan tablet 50 mg  50 mg Oral BID Cristian Patricia MD   50 mg at 12/02/18 0910    metoprolol tartrate (LOPRESSOR) tablet 50 mg  50 mg Oral BID Cristian Patricia MD   50 mg at 12/02/18 0910    naloxone 0.4 mg/mL  injection 0.02 mg  0.02 mg Intravenous PRN Cristian Patricia MD        NIFEdipine 24 hr tablet 60 mg  60 mg Oral Daily Galdino Benitez MD        ondansetron injection 4 mg  4 mg Intravenous Q8H PRN Chris Herbert MD   4 mg at 12/01/18 0619       Review of patient's allergies indicates:   Allergen Reactions    Epinephrine Anaphylaxis     Can cause  a Carcinoid Crisis    Contrast media Hives, Itching and Swelling    Ibuprofen Hives, Itching and Swelling    Iodinated contrast- oral and iv dye     Sulfa (sulfonamide antibiotics) Hives, Itching and Swelling       Objective    Temp:  [97.6 °F (36.4 °C)-100.6 °F (38.1 °C)]   Pulse:  []   Resp:  [20-22]   BP: (144-198)/(67-88)   SpO2:  [98 %-100 %]   Body mass index is 25.8 kg/m².     Physical Exam:    General: anxious and in pain   HEENT: Conjunctivae and EOM are normal. No scleral icterus.   Neck: supple. No masses. No thyromegaly. No bruits.  Lymph nodes: no lymphadenopathy  Cardio: RRR. S1, S2 normal without murmur/gallop/rub. No S3, S4. chest pain elicited  with palpation of left chest. Intact distal pulses.   Pulmonary: CTAB. No wheezes/rales/crackles.  Skin: No rash noted. Patient is not diaphoretic. No pallor.   Abdomen: distended diffuse tenderness worse in RLQ.   Extremities: no cyanosis, clubbing, or edema. No rash or lesions. + pedal pulses  MSK: No edema or tenderness.  Neuro: CN II-XII grossly intact. No decrease in strength. No decrease in sensation.  Psychiatry: alert and oriented X3. Responds appropriately to questions.    Laboratory:    Most Recent Data:  CBC:   Lab Results   Component Value Date    WBC 14.03 (H) 12/02/2018    HGB 8.9 (L) 12/02/2018    HCT 28.9 (L) 12/02/2018     12/02/2018    MCV 87 12/02/2018    RDW 15.7 (H) 12/02/2018     BMP:   Lab Results   Component Value Date     (L) 12/02/2018    K 4.2 12/02/2018    CL 96 12/02/2018    CO2 27 12/02/2018    BUN 13 12/02/2018     (H) 12/02/2018     CALCIUM 9.0 12/02/2018    MG 1.6 12/02/2018    PHOS 1.9 (L) 12/02/2018     LFTs:   Lab Results   Component Value Date    PROT 7.1 12/02/2018    ALBUMIN 2.8 (L) 12/02/2018    BILITOT 0.6 12/02/2018    AST 59 (H) 12/02/2018    ALKPHOS 96 12/02/2018    ALT 16 12/02/2018     Coags:   Lab Results   Component Value Date    INR 0.9 11/29/2018     FLP:   Lab Results   Component Value Date    CHOL 100 (L) 04/28/2018    HDL 64 04/28/2018    LDLCALC 24.2 (L) 04/28/2018    TRIG 59 04/28/2018    CHOLHDL 64.0 (H) 04/28/2018     DM:   Lab Results   Component Value Date    HGBA1C 5.6 12/30/2017    LDLCALC 24.2 (L) 04/28/2018    CREATININE 0.9 12/02/2018     HgbA1c:   Lab Results   Component Value Date    HGBA1C 5.6 12/30/2017     Thyroid:   Lab Results   Component Value Date    TSH 0.896 03/14/2016     Anemia:   Lab Results   Component Value Date    IRON 28 (L) 08/14/2018    TIBC 326 08/14/2018    FERRITIN 17 (L) 08/14/2018    SYWPDBTX25 416 11/08/2017    FOLATE 8.2 06/12/2016     Cardiac:   Lab Results   Component Value Date    TROPONINI <0.006 05/19/2018     (H) 05/19/2018     Trended Lab Data:  Recent Labs   Lab 11/29/18  1321 12/02/18  0346   WBC 4.53 14.03*   HGB 7.5* 8.9*   HCT 25.4* 28.9*    213   MCV 89 87   RDW 15.2* 15.7*    133*   K 3.9 4.2    96   CO2 29 27   BUN 13 13    181*   PROT 6.4 7.1   ALBUMIN 2.7* 2.8*   BILITOT 0.2 0.6   AST 10 59*   ALKPHOS 74 96   ALT <5* 16     Trended Cardiac Data:  No results for input(s): TROPONINI, CKTOTAL, CKMB, BNP in the last 168 hours.    No results found for: EF    No results found for this visit on 11/30/18.    Microbiology Results (last 7 days)     ** No results found for the last 168 hours. **          Urinalysis:   Lab Results   Component Value Date    LABURIN ESCHERICHIA COLI  >100,000 cfu/ml   10/03/2018    COLORU brown 10/03/2018    SPECGRAV 1.020 10/03/2018    NITRITE + 10/03/2018    PROTEINUR 30 10/03/2018    KETONESU trace 10/03/2018     UROBILINOGEN 0.2 10/03/2018    BILIRUBINUR - 10/03/2018       Radiology:  IR Angiogram Each Additional Vessel   Final Result      Successful conventional chemoembolization of the right hepatic artery, as above.      The patient was very anxious before, during and after the procedure.  This was not helped by moderate sedation.  Recommend anesthesiology assistance with subsequent procedures.      Plan:      1. Admit per Dr. Perez.   2. Follow-up imaging in 2-3 months to evaluate response to treatment.   Attestation:      Signer name: Jeremiah Young MD      I attest that I was present for the entire procedure. I reviewed the stored images and agree with the report as written.         Electronically signed by: Jeremiah Young   Date:    11/30/2018   Time:    17:09      IR Angiogram Visceral   Final Result      Successful conventional chemoembolization of the right hepatic artery, as above.      The patient was very anxious before, during and after the procedure.  This was not helped by moderate sedation.  Recommend anesthesiology assistance with subsequent procedures.      Plan:      1. Admit per Dr. Perez.   2. Follow-up imaging in 2-3 months to evaluate response to treatment.   Attestation:      Signer name: Jeremiah Young MD      I attest that I was present for the entire procedure. I reviewed the stored images and agree with the report as written.         Electronically signed by: Jeremiah Young   Date:    11/30/2018   Time:    17:09      IR Ultrasound Guidance   Final Result      Successful conventional chemoembolization of the right hepatic artery, as above.      The patient was very anxious before, during and after the procedure.  This was not helped by moderate sedation.  Recommend anesthesiology assistance with subsequent procedures.      Plan:      1. Admit per Dr. Perez.   2. Follow-up imaging in 2-3 months to evaluate response to treatment.   Attestation:      Signer name: Jeremiah Young MD       I attest that I was present for the entire procedure. I reviewed the stored images and agree with the report as written.         Electronically signed by: Jeremiah Young   Date:    11/30/2018   Time:    17:09      IR Angiogram Each Additional Vessel   Final Result      Successful conventional chemoembolization of the right hepatic artery, as above.      The patient was very anxious before, during and after the procedure.  This was not helped by moderate sedation.  Recommend anesthesiology assistance with subsequent procedures.      Plan:      1. Admit per Dr. Perez.   2. Follow-up imaging in 2-3 months to evaluate response to treatment.   Attestation:      Signer name: Jeremiah Young MD      I attest that I was present for the entire procedure. I reviewed the stored images and agree with the report as written.         Electronically signed by: Jeremiah Young   Date:    11/30/2018   Time:    17:09      IR Angiogram Each Additional Vessel   Final Result      Successful conventional chemoembolization of the right hepatic artery, as above.      The patient was very anxious before, during and after the procedure.  This was not helped by moderate sedation.  Recommend anesthesiology assistance with subsequent procedures.      Plan:      1. Admit per Dr. Perez.   2. Follow-up imaging in 2-3 months to evaluate response to treatment.   Attestation:      Signer name: Jeremiah Young MD      I attest that I was present for the entire procedure. I reviewed the stored images and agree with the report as written.         Electronically signed by: Jeremiah Young   Date:    11/30/2018   Time:    17:09      IR Chemotherapy Agent Administration   Final Result      Successful conventional chemoembolization of the right hepatic artery, as above.      The patient was very anxious before, during and after the procedure.  This was not helped by moderate sedation.  Recommend anesthesiology assistance with subsequent procedures.       Plan:      1. Admit per Dr. Perez.   2. Follow-up imaging in 2-3 months to evaluate response to treatment.   Attestation:      Signer name: Jeremiah Young MD      I attest that I was present for the entire procedure. I reviewed the stored images and agree with the report as written.         Electronically signed by: Jeremiah Young   Date:    11/30/2018   Time:    17:09      IR Embolization for Tumor_Organ Ischemia   Final Result      Successful conventional chemoembolization of the right hepatic artery, as above.      The patient was very anxious before, during and after the procedure.  This was not helped by moderate sedation.  Recommend anesthesiology assistance with subsequent procedures.      Plan:      1. Admit per Dr. Perez.   2. Follow-up imaging in 2-3 months to evaluate response to treatment.   Attestation:      Signer name: Jeremiah Young MD      I attest that I was present for the entire procedure. I reviewed the stored images and agree with the report as written.         Electronically signed by: Jeremiah Young   Date:    11/30/2018   Time:    17:09      IR Angiogram Visceral   Final Result      Successful conventional chemoembolization of the right hepatic artery, as above.      The patient was very anxious before, during and after the procedure.  This was not helped by moderate sedation.  Recommend anesthesiology assistance with subsequent procedures.      Plan:      1. Admit per Dr. Perez.   2. Follow-up imaging in 2-3 months to evaluate response to treatment.   Attestation:      Signer name: Jeremiah Young MD      I attest that I was present for the entire procedure. I reviewed the stored images and agree with the report as written.         Electronically signed by: Jeremiah Young   Date:    11/30/2018   Time:    17:09          Assessment/Plan:    HTN  Patient home medications losartan and lopressor continued  Patient had persistent HTN and thus HCTZ and amlodipine added  Patient  is on pain and this is likely elevating her blood pressure-better control of her pain will lower her blood pressure  HCTZ is weaker than chlorthalidone and has shorter half life than chlorthalidone. HCTZ switched to chlorthalidone for kayode  Amlodipine switched to procardia-procardia is stronger and has higher max dose. CCB are recommended in AA   Continue losartan  Lopressor has poor blood pressure effects and could switch to coreg with has alpha and beta activity thus lowering blood pressure more than lopressor   Will follow at a distance. If you have any questions feel free to contact me anytime.     Galdino Benitez MD   12/02/2018

## 2018-12-02 NOTE — NURSING
Morning round deferred due to patient being asleep and not easy to keep awake.  Will continue to monitor.

## 2018-12-02 NOTE — PROGRESS NOTES
Progress notes reviewed. Evening rounds completed. Introduced self as VN for this shift. Educated pt on VN's role in patient's care.  Plan of care reviewed with patient. Opportunity given for pt's questions. No questions  expressed at this time. Does have urge to void and is nauseated . PCT notified pt. Requires assistance . VN to continue to monitor.

## 2018-12-02 NOTE — NURSING
"Pt awake with son and daughter in law at bedside. Pt is complaining of neck pain and asking for pain med and moaning. Lunch tray still at bedside uneaten as pt sound asleep and snoring most of the day. Explained to pt and her son that no  pain medication is ordered at this time as pt appeared over-sedated and Lomotil RX bottle observed in purse this am. Son looked through purse and found a RX bottle of oxycodone. Dtr in law, Who is a pharmacist noted that RX was filled on the 27TH and there are only some of the pills left. The family is worried that she is " making cocktails " with her prescriptions and are asking what can be done. They are requesting info for psychological/detox/depression/ comittment options as they are afraid that she is going to OD on meds, as this has been a problem since before her Cancer Dx. Dr Shafer unit and informed of this situation.  "

## 2018-12-03 ENCOUNTER — OUTPATIENT CASE MANAGEMENT (OUTPATIENT)
Dept: ADMINISTRATIVE | Facility: OTHER | Age: 68
End: 2018-12-03

## 2018-12-03 PROBLEM — F11.90 NARCOTIC DRUG USE: Status: ACTIVE | Noted: 2018-12-03

## 2018-12-03 PROCEDURE — 25000003 PHARM REV CODE 250: Performed by: STUDENT IN AN ORGANIZED HEALTH CARE EDUCATION/TRAINING PROGRAM

## 2018-12-03 PROCEDURE — 94761 N-INVAS EAR/PLS OXIMETRY MLT: CPT

## 2018-12-03 PROCEDURE — 25000003 PHARM REV CODE 250: Performed by: SURGERY

## 2018-12-03 PROCEDURE — 63600175 PHARM REV CODE 636 W HCPCS: Performed by: STUDENT IN AN ORGANIZED HEALTH CARE EDUCATION/TRAINING PROGRAM

## 2018-12-03 PROCEDURE — 11000001 HC ACUTE MED/SURG PRIVATE ROOM

## 2018-12-03 RX ADMIN — FAMOTIDINE 20 MG: 20 TABLET ORAL at 08:12

## 2018-12-03 RX ADMIN — BUMETANIDE 0.5 MG: 0.5 TABLET ORAL at 08:12

## 2018-12-03 RX ADMIN — NIFEDIPINE 60 MG: 30 TABLET, FILM COATED, EXTENDED RELEASE ORAL at 08:12

## 2018-12-03 RX ADMIN — LOSARTAN POTASSIUM 50 MG: 50 TABLET, FILM COATED ORAL at 08:12

## 2018-12-03 RX ADMIN — GABAPENTIN 100 MG: 100 CAPSULE ORAL at 08:12

## 2018-12-03 RX ADMIN — ENOXAPARIN SODIUM 40 MG: 100 INJECTION SUBCUTANEOUS at 08:12

## 2018-12-03 RX ADMIN — METOPROLOL TARTRATE 50 MG: 50 TABLET ORAL at 08:12

## 2018-12-03 RX ADMIN — CHLORTHALIDONE 25 MG: 25 TABLET ORAL at 08:12

## 2018-12-03 NOTE — CONSULTS
Transfer this patient to [facility] if family agrees.  Treatment options discussed with the son.    Hold all Narcotics.     Case discussed with nurse.  See consult for details.

## 2018-12-03 NOTE — PROGRESS NOTES
Progress notes reviewed. Evening rounds completed. Introduced self as VN for this shift. Educated pt on VN's role in patient' care. Pt. Nodded . Minimal verbal interaction . No questions or concerns expressed at this time. VN to continue to monitor.

## 2018-12-03 NOTE — NURSING
A psych consult has been ordered and sons would like to be here at that time, if possible.Names and numbers in sticky note.

## 2018-12-03 NOTE — PROGRESS NOTES
SUMMARY  Chart review. Patient is currently inpatient.     INTERVENTION  NONE    PLAN  Case closure.

## 2018-12-03 NOTE — PROGRESS NOTES
[facility] General Surgery  Neuroendocrine Tumor Service  Progress Note     Admission Date: 11/30/2018  Hospital Length of Stay: 1  Principal Problem: 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     Subjective:   HD#2  LISAEON  Complains of pain this AM   Hypertension more controlled today   -n/-v, urinating without issues     Scheduled Meds:   bumetanide  0.5 mg Oral Daily    chlorthalidone  25 mg Oral Daily    enoxparin  40 mg Subcutaneous Q24H    famotidine  20 mg Oral Q12H    gabapentin  100 mg Oral BID    losartan  50 mg Oral BID    metoprolol tartrate  50 mg Oral BID    NIFEdipine  60 mg Oral Daily     Continuous Infusions:   dextrose 5 % and 0.45 % NaCl with KCl 20 mEq Stopped (12/02/18 2144)     PRN Meds:.labetalol, LORazepam, naloxone, ondansetron    Objective:      Temp:  [96.8 °F (36 °C)-99.2 °F (37.3 °C)] 96.8 °F (36 °C)  Pulse:  [] 104  Resp:  [18-22] 18  SpO2:  [97 %] 97 %  BP: (126-157)/(58-70) 128/58  Weight change: -1 kg (-3.3 oz)    Intake/Output Summary (Last 24 hours) at 12/3/2018 1008  Last data filed at 12/3/2018 0121  Gross per 24 hour   Intake 850 ml   Output 1450 ml   Net -600 ml       Physical Exam:  GEN: awake, alert, NAD  CV: RRR   PULM: Lungs CTA B/L   ABD: soft, diffusely ttp, nd, bs persent   EXT: Peripheral pulses present and equal bilaterally    No results for input(s): NA, K, CO2, CL, BUN, CREATININE, GLUCOSE, CALCIUM, PROT, AST, ALT, ALKPHOS, BILITOT in the last 24 hours.    Invalid input(s):  ALBUMIN  No results for input(s): WBC, HGB, HCT, PLT in the last 24 hours.    Significant Imaging: I have reviewed all pertinent imaging results/findings within the past 24 hours.     Assessment and Plan:   67yo F s/p TACE for mNET,  Also with chronic pain, on high dose narcotics outpt as well as poorly controled HTN    - continue anti-hypertensives as BP is more controlled this AM  - will continue to work on pain control   - continue regular diet  -  ambulation and OOBTC    Miriam Wilson MD  Neuroendocrine Surgery  C: [phone]

## 2018-12-03 NOTE — PLAN OF CARE
TN noted this am pt in outpatient recovery status since 11/30/18 and alerted DELVIS Garcia CM Director and Brad Darden Manager    Pt informed Tn she lives with her son, Ryan, his wife , and children.     [facility] [address]    Pt stated she is current with hh but can not recall name; Tn requested she ask son or daughter in law to obtain name. Pt also informed Tn her family or insurance provides transportation.    Pt informed Tn she had procedure Friday but has been experiencing a lot of pain and nausea since and she is waiting to see another doctor. Pt is currently eating lunch and sons Dallas and Jovanni at bedside    Tn gave pt d/c folder, brochure, and business card and encouraged to call.      After leaving pt's room, Pt's son,, Job,  informed Tn he is concerned that his mother has an pain medicine addiction. He informed Tn his mother [facility] but feels she is prescribed too much. Son informed Tn he has spoken to many doctors about this and is concerned because MD here is saying pt is ready to be discharge home but he was not able to speak with Dr. Dow this am. Floor nurse informed Tn that Dr. Dow is coming back this afternoon and Tn informed son of that but he wanted to know exact time. Tn informed son MD does not know exactly when he'll be back. Tn also informed son we can give pt resources on addiction but he stated pt does not feel she has a problem and requested to see . Tn notified MARIANO Jose of request and she will will also bring son community resources for [place].         12/03/18 1500   Discharge Assessment   Assessment Type Discharge Planning Assessment   Confirmed/corrected address and phone number on facesheet? Yes   Assessment information obtained from? Patient   Communicated expected length of stay with patient/caregiver yes   Prior to hospitilization cognitive status: Alert/Oriented   Prior to hospitalization functional  status: Needs Assistance;Assistive Equipment   Current cognitive status: Alert/Oriented   Current Functional Status: Needs Assistance;Assistive Equipment   Lives With child(katherine), adult;grandchild(katherine)   Able to Return to Prior Arrangements yes   Is patient able to care for self after discharge? Yes   Who are your caregiver(s) and their phone number(s)? Ryan (son) [phone]; Dallas (son) [phone]; Jovanni (son) [phone]   Patient's perception of discharge disposition home or selfcare   Readmission Within The Last 30 Days no previous admission in last 30 days   Patient currently being followed by outpatient case management? No   Equipment Currently Used at Home rollator;bedside commode;shower chair   Do you have any problems affording any of your prescribed medications? No   Is the patient taking medications as prescribed? yes   Does the patient have transportation home? Yes   Transportation Available family or friend will provide   Does the patient receive services at the [facility]? No   Discharge Plan A Home with family;Home Health   Discharge Plan B Home with family;Home Health   Patient/Family In Agreement With Plan yes

## 2018-12-03 NOTE — PLAN OF CARE
Problem: Patient Care Overview  Goal: Plan of Care Review  Outcome: Ongoing (interventions implemented as appropriate)  Patient resting in bed, AAOx4. Medications administered as ordered. Patient with no IV site, refusing for nursing staff to start one. Complaints of neck pain overnight, heat pack applied. Telemetry on, NSR. Asleep on and off through the shift. Encouraged to call with needs or concerns. Will continue to monitor.

## 2018-12-04 VITALS
OXYGEN SATURATION: 95 % | SYSTOLIC BLOOD PRESSURE: 99 MMHG | DIASTOLIC BLOOD PRESSURE: 53 MMHG | WEIGHT: 150.56 LBS | HEIGHT: 66 IN | BODY MASS INDEX: 24.2 KG/M2 | HEART RATE: 89 BPM | TEMPERATURE: 96 F | RESPIRATION RATE: 20 BRPM

## 2018-12-04 LAB
ALBUMIN SERPL BCP-MCNC: 2.6 G/DL
ALP SERPL-CCNC: 99 U/L
ALT SERPL W/O P-5'-P-CCNC: 17 U/L
ANION GAP SERPL CALC-SCNC: 12 MMOL/L
AST SERPL-CCNC: 40 U/L
BACTERIA #/AREA URNS HPF: ABNORMAL /HPF
BASOPHILS # BLD AUTO: 0.01 K/UL
BASOPHILS NFR BLD: 0.1 %
BILIRUB SERPL-MCNC: 0.7 MG/DL
BILIRUB UR QL STRIP: NEGATIVE
BUN SERPL-MCNC: 47 MG/DL
CALCIUM SERPL-MCNC: 9.8 MG/DL
CHLORIDE SERPL-SCNC: 94 MMOL/L
CLARITY UR: ABNORMAL
CO2 SERPL-SCNC: 29 MMOL/L
COLOR UR: ABNORMAL
CREAT SERPL-MCNC: 1.8 MG/DL
DIFFERENTIAL METHOD: ABNORMAL
EOSINOPHIL # BLD AUTO: 0 K/UL
EOSINOPHIL NFR BLD: 0 %
ERYTHROCYTE [DISTWIDTH] IN BLOOD BY AUTOMATED COUNT: 15.3 %
EST. GFR  (AFRICAN AMERICAN): 33 ML/MIN/1.73 M^2
EST. GFR  (NON AFRICAN AMERICAN): 29 ML/MIN/1.73 M^2
GLUCOSE SERPL-MCNC: 110 MG/DL
GLUCOSE UR QL STRIP: NEGATIVE
HCT VFR BLD AUTO: 30.1 %
HGB BLD-MCNC: 9.3 G/DL
HGB UR QL STRIP: ABNORMAL
KETONES UR QL STRIP: NEGATIVE
LEUKOCYTE ESTERASE UR QL STRIP: ABNORMAL
LYMPHOCYTES # BLD AUTO: 0.8 K/UL
LYMPHOCYTES NFR BLD: 5.1 %
MCH RBC QN AUTO: 26.3 PG
MCHC RBC AUTO-ENTMCNC: 30.9 G/DL
MCV RBC AUTO: 85 FL
MICROSCOPIC COMMENT: ABNORMAL
MONOCYTES # BLD AUTO: 1.7 K/UL
MONOCYTES NFR BLD: 11.2 %
NEUTROPHILS # BLD AUTO: 12.7 K/UL
NEUTROPHILS NFR BLD: 83.3 %
NITRITE UR QL STRIP: NEGATIVE
PH UR STRIP: 6 [PH] (ref 5–8)
PLATELET # BLD AUTO: 255 K/UL
PMV BLD AUTO: 10.1 FL
POTASSIUM SERPL-SCNC: 3.3 MMOL/L
PROT SERPL-MCNC: 7.6 G/DL
PROT UR QL STRIP: NEGATIVE
RBC # BLD AUTO: 3.53 M/UL
RBC #/AREA URNS HPF: 50 /HPF (ref 0–4)
SODIUM SERPL-SCNC: 135 MMOL/L
SP GR UR STRIP: 1.02 (ref 1–1.03)
SQUAMOUS #/AREA URNS HPF: 2 /HPF
URN SPEC COLLECT METH UR: ABNORMAL
UROBILINOGEN UR STRIP-ACNC: NEGATIVE EU/DL
WBC # BLD AUTO: 15.21 K/UL
WBC #/AREA URNS HPF: >100 /HPF (ref 0–5)
WBC CLUMPS URNS QL MICRO: ABNORMAL

## 2018-12-04 PROCEDURE — 85025 COMPLETE CBC W/AUTO DIFF WBC: CPT

## 2018-12-04 PROCEDURE — 94761 N-INVAS EAR/PLS OXIMETRY MLT: CPT

## 2018-12-04 PROCEDURE — 25000003 PHARM REV CODE 250: Performed by: STUDENT IN AN ORGANIZED HEALTH CARE EDUCATION/TRAINING PROGRAM

## 2018-12-04 PROCEDURE — 25000003 PHARM REV CODE 250: Performed by: SURGERY

## 2018-12-04 PROCEDURE — 36415 COLL VENOUS BLD VENIPUNCTURE: CPT

## 2018-12-04 PROCEDURE — 87186 SC STD MICRODIL/AGAR DIL: CPT

## 2018-12-04 PROCEDURE — 87086 URINE CULTURE/COLONY COUNT: CPT

## 2018-12-04 PROCEDURE — 87077 CULTURE AEROBIC IDENTIFY: CPT

## 2018-12-04 PROCEDURE — 80053 COMPREHEN METABOLIC PANEL: CPT

## 2018-12-04 PROCEDURE — 81000 URINALYSIS NONAUTO W/SCOPE: CPT

## 2018-12-04 PROCEDURE — 63600175 PHARM REV CODE 636 W HCPCS: Performed by: STUDENT IN AN ORGANIZED HEALTH CARE EDUCATION/TRAINING PROGRAM

## 2018-12-04 PROCEDURE — 87088 URINE BACTERIA CULTURE: CPT

## 2018-12-04 RX ORDER — FAMOTIDINE 20 MG/1
20 TABLET, FILM COATED ORAL DAILY
Status: DISCONTINUED | OUTPATIENT
Start: 2018-12-05 | End: 2018-12-04

## 2018-12-04 RX ORDER — ENOXAPARIN SODIUM 100 MG/ML
30 INJECTION SUBCUTANEOUS
Status: DISCONTINUED | OUTPATIENT
Start: 2018-12-04 | End: 2018-12-04

## 2018-12-04 RX ORDER — ENOXAPARIN SODIUM 100 MG/ML
30 INJECTION SUBCUTANEOUS
Status: DISCONTINUED | OUTPATIENT
Start: 2018-12-05 | End: 2018-12-04

## 2018-12-04 RX ADMIN — CHLORTHALIDONE 25 MG: 25 TABLET ORAL at 09:12

## 2018-12-04 RX ADMIN — NIFEDIPINE 60 MG: 30 TABLET, FILM COATED, EXTENDED RELEASE ORAL at 09:12

## 2018-12-04 RX ADMIN — METOPROLOL TARTRATE 50 MG: 50 TABLET ORAL at 09:12

## 2018-12-04 RX ADMIN — FAMOTIDINE 20 MG: 20 TABLET ORAL at 09:12

## 2018-12-04 RX ADMIN — ENOXAPARIN SODIUM 40 MG: 100 INJECTION SUBCUTANEOUS at 09:12

## 2018-12-04 RX ADMIN — GABAPENTIN 100 MG: 100 CAPSULE ORAL at 09:12

## 2018-12-04 RX ADMIN — LOSARTAN POTASSIUM 50 MG: 50 TABLET, FILM COATED ORAL at 09:12

## 2018-12-04 RX ADMIN — BUMETANIDE 0.5 MG: 0.5 TABLET ORAL at 09:12

## 2018-12-04 NOTE — PLAN OF CARE
Voluntary admission form signed by patient.  Original form placed in discharge packet and copy faxed to [facility].   received email from [facility] admit coordinator, Cally confirming she received form.       12/04/18 5314   Final Note   Assessment Type Final Discharge Note   Anticipated Discharge Disposition Psych   What phone number can be called within the next 1-3 days to see how you are doing after discharge? [phone]   Hospital Follow Up  Appt(s) scheduled? Yes   Discharge plans and expectations educations in teach back method with documentation complete? Yes   Right Care Referral Info   Post Acute Recommendation Other   Referral Type (Psych Hospital)   Facility Name ([facility])   [place] ([facility])   [facility] ([place], [place])

## 2018-12-04 NOTE — PROGRESS NOTES
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       Allergies:  Review of patient's allergies indicates:   Allergen Reactions    Epinephrine Anaphylaxis     Can cause  a Carcinoid Crisis    Contrast media Hives, Itching and Swelling    Ibuprofen Hives, Itching and Swelling    Iodinated contrast- oral and iv dye     Sulfa (sulfonamide antibiotics) Hives, Itching and Swelling       Diet: regular diet    Activities: Activity as tolerated    Nursing: None     Labs: CBC and BMP q48hr     CONSULTS:    Physical Therapy to evaluate and treat.     MISCELLANEOUS CARE:  None    WOUND CARE ORDERS  None    Medications: Review discharge medications with patient and family and provide education.      Current Discharge Medication List      CONTINUE these medications which have NOT CHANGED    Details   bumetanide (BUMEX) 0.5 MG Tab Take 1 tablet (0.5 mg total) by mouth once daily.  Qty: 30 tablet, Refills: 2    Associated Diagnoses: Lower extremity edema      ferrous gluconate 324 mg (36 mg iron) Tab Take 1 tablet by mouth once daily.    Associated Diagnoses: Other iron deficiency anemia      gabapentin (NEURONTIN) 100 MG capsule takes one capsule BID  Qty: 120 capsule, Refills: 6      iron-vit c-vit b12-folic acid (IRON-C PLUS) tablet Take 1 tablet by mouth once  daily.  Qty: 30 tablet, Refills: 11      lipase-protease-amylase 12,000-38,000-60,000 units (CREON) CpDR Take 1 capsule by mouth 3 (three) times daily with meals.  Qty: 90 capsule, Refills: 11      losartan (COZAAR) 50 MG tablet TAKE 1 TABLET BY MOUTH DAILY  Qty: 30 tablet, Refills: 2      magnesium oxide (MAG-OX) 400 mg tablet Take 1 tablet (400 mg total) by mouth once daily.  Qty: 30 tablet, Refills: 2    Associated Diagnoses: Hypomagnesemia      metoprolol tartrate (LOPRESSOR) 50 MG tablet Take 1 tablet (50 mg total) by mouth 2 (two) times daily.  Qty: 180 tablet, Refills: 0    Associated Diagnoses: Essential hypertension      lanreotide (SOMATULINE DEPOT) 120 mg/0.5 mL Syrg Inject 120 mg into the skin every 28 days.      walker (ULTRA-LIGHT ROLLATOR) Misc 1 Units by Misc.(Non-Drug; Combo Route) route once daily.  Qty: 1 each, Refills: 0    Associated Diagnoses: Lower extremity edema; Chronic midline low back pain, with sciatica presence unspecified         STOP taking these medications       ciprofloxacin HCl (CIPRO) 500 MG tablet Comments:   Reason for Stopping:         diphenoxylate-atropine 2.5-0.025 mg (LOMOTIL) 2.5-0.025 mg per tablet Comments:   Reason for Stopping:         nystatin (MYCOSTATIN) 100,000 unit/mL suspension Comments:   Reason for Stopping:         oxyCODONE (ROXICODONE) 30 MG Tab Comments:   Reason for Stopping:         promethazine (PHENERGAN) 25 MG tablet Comments:   Reason for Stopping:         promethazine (PHENERGAN) 25 MG tablet Comments:   Reason for Stopping:         triamcinolone acetonide 0.1% (KENALOG) 0.1 % ointment Comments:   Reason for Stopping:                    _________________________________  Cristian Patricia MD  12/04/2018

## 2018-12-04 NOTE — PLAN OF CARE
Problem: Patient Care Overview  Goal: Plan of Care Review  Outcome: Ongoing (interventions implemented as appropriate)  PAtient is to be discharged to [facility], Report called.  Waiting transport.  Family updated.  Will continue to monitor.

## 2018-12-04 NOTE — PLAN OF CARE
Problem: Patient Care Overview  Goal: Plan of Care Review  Outcome: Outcome(s) achieved Date Met: 12/03/18  Patient oriented x 4. Very drowsy this shift. No complaints of pain. VSS. NAD. Cardiac monitoring in place. Psych consult performed.  referred pt to [facility], family is aware and agrees. Safety maintained. Will continue to monitor.

## 2018-12-04 NOTE — PLAN OF CARE
12/04/18 1455   Post-Acute Status   Post-Acute Authorization Placement   Post-Acute Placement Status Authorization Obtained      faxed transfer facility orders to [facility] admit coordinator, Cally to review for admission today.     made phone contact with Cally at [facility] and confirmed orders had been received and reviewed.  Cally gave permission for patient to admit today and nurse to call report to [phone] option 2.    Bedside nurseLincoln informed patient is ready for discharge to [facility].  He can call report to number located on front os ambulance packet.     informed by bedside nurseLincoln that [facility] admit coordinator will fax a voluntary admit form for patient to sign.     arranged [facility] transport for  within the hour with trip sheet number [id number].

## 2018-12-04 NOTE — NURSING
Pt allowed me to search her purse and other personal belongings. No prescribed medication medication found anywhere.

## 2018-12-04 NOTE — PSYCH
"IDENTIFICATION DATA:  This is a 68-year-old -American male who was   brought to [facility] for further workup and procedure.  This consult is   requested by Dr. Perry for opioid abuse.  The patient is not on a PEC status.    CHIEF COMPLAINT:  After a long pause, the patient said that "I have a tumor."    HISTORY OF PRESENT ILLNESS:  According to record, the patient has malignant   carcinoid tumor of unknown size and has metastasis to liver.  The patient's son   has reported that the patient was here for some procedure.  She is supposed to   be discharged today.  The patient's doctor was concerned about abuse of her   opioids.  The patient was found using home narcotics in addition to narcotics   prescribed by doctor at [facility].  The patient consumed all of her refill,   which was prescribed at on 11/27/2018.  The patient admitted that she was   feeling little drowsy and had fall at home.  The patient does not believe that   she is abusing her pain medicine.  The patient states that she has pain in her   abdomen.  Her son has reported that she might have mild pain.  The patient   states that she was on oxycodone 30 mg.  The patient is sedated.  The patient is   drowsy at this time.  She is out of Sertraline and has trouble communicating.    She has poverty of thought process.  The patient states that she has no   depression.  The patient looks sad.  The patient states that sleep is okay.  The   patient admitted that she sleeps at nighttime and also during the daytime.  She   looks tired.  She denies hopelessness, helplessness, worthlessness or any   suicidal thoughts.  The patient denies use of alcohol.  The patient denies any   anxiety.  The patient states that she is not hearing voices or seeing things.    She denies abuse of her pain medication, which is a concern of the patient's   family.  The patient has no obsessive-compulsive features.  The patient denies   any family problem or housing " issues.  The patient knows that her kids are   concerned about her, especially related to her pain medicine.    SOCIAL AND FAMILY HISTORY:  The patient was born and raised in [place].  She   described her childhood as good.  She was raised by parents.  She worked.  The   patient stated that she was , but at the same time she said   that she has 11th grade education.  The patient lives with her son.  The patient   denies family history of mental illness.    MEDICAL HISTORY:  The patient has neuroendocrine tumor.  She has metastasis to   liver.  The patient has nephrolithiasis and history of colon cancer.  She is on   nifedipine, metoprolol, gabapentin, and was on Norco.  The patient is ____.  She   [facility] and her doctor was  ____.    ALLERGIES:  SHE IS ALLERGIC TO EPINEPHRINE, CONTRAST MEDIA, SULFA, IBUPROFEN.    MENTAL STATUS EXAMINATION:  This is a 68-year-old -American female, who   looks older than her stated age.  She is alert, cooperative and oriented to   person.  She stated that she is at [place].  She was not able to tell   day, date, month and year.  She was able to recall 3 objects out of 3   immediately, but 3 out of 3 after 5 minutes.  She was ____ 1 out of 3 with some   assistance.  The patient has some thought blocking.  Her speech is still   slightly slurred.  She has trouble with attention and concentration.  Insight   and judgment are impaired.  She is of average intelligent person.  She has no   thoughts of harm to self or others.    PSYCHIATRIC DIAGNOSES:  AXIS I:  Opioid use disorder, severe, in a controlled environment, depressive   disorder, NOS.  AXIS II:  No diagnosis.  AXIS III:  Neuroendocrine tumor of liver, colon cancer.  AXIS IV:  Abuse of medicine, medical problems.  AXIS V:  35.    RECOMMENDATIONS:  I spoke with the patient's family about options available.  The patient's son is   going to communicate  with the family about transfer to [facility].  We   will restart this patient on Suboxone for opioid use disorder and replace   opioid agonist or Suboxone.    PROGNOSIS:  Fair.    ESTIMATED LENGTH OF STAY IN HOSPITAL:  Would be 5 days.    ASSETS:  The patient is verbal, cooperative and has supportive family.      AI/HN  dd: 12/03/2018 17:39:34 (CST)  td: 12/04/2018 01:10:09 (CST)  Doc ID   #[id number]  Job ID #[id number]    CC:

## 2018-12-04 NOTE — PLAN OF CARE
12/04/18 1144   Post-Acute Status   Post-Acute Authorization Placement   Post-Acute Placement Status Referrals Sent     Referral sent to [facility] admit coordinator, Cally to review for admission today.

## 2018-12-04 NOTE — PROGRESS NOTES
made phone contact with [facility] at [phone] to check the status of trip sheet number [id number] (transport to [facility]).  Michelle with [facility] reported transportation should arrive around 5:30pm or 6:00pm.

## 2018-12-04 NOTE — NURSING
CASE MANAGEMENT UPDATE  Discuss discharge plan with patient and her son Dallas  And goals of care. Discharge options and resources given on Drug Rehab and Addictive Services, Also given information on Private Sitter Services, Outpatient and Day  Counseling Services and Programs      Patient and Son agree to discharge plan for patient to go to [facility] Today.  Savanna Garcia  Director of Case Management

## 2018-12-04 NOTE — PROGRESS NOTES
Pharmacist Renal Dose Adjustment Note    Patito Domingo is a 68 y.o. female being treated with the medication *famotidine*    Patient Data:    Vital Signs (Most Recent):  Temp: 98.2 °F (36.8 °C) (12/04/18 1158)  Pulse: 85 (12/04/18 1200)  Resp: 18 (12/04/18 1158)  BP: (!) 116/55 (12/04/18 1158)  SpO2: 95 % (12/04/18 0700)   Vital Signs (72h Range):  Temp:  [96.8 °F (36 °C)-100.6 °F (38.1 °C)]   Pulse:  []   Resp:  [18-24]   BP: (116-198)/(55-88)   SpO2:  [94 %-100 %]      Recent Labs   Lab 11/29/18  1321 12/02/18  0346 12/04/18  0728   CREATININE 1.1 0.9 1.8*     Serum creatinine: 1.8 mg/dL (H) 12/04/18 0728  Estimated creatinine clearance: 28.8 mL/min (A)    Medication famotidine dose: 20 mg frequency bid will be changed to medication famotidine dose 20 mg frequency daily    Pharmacist's Name: Vidhi Carroll  Pharmacist's Extension: [phone]

## 2018-12-04 NOTE — PROGRESS NOTES
Pharmacist Renal Dose Adjustment Note    Patito Domingo is a 68 y.o. female being treated with the medication lovenox    Patient Data:    Vital Signs (Most Recent):  Temp: 98.2 °F (36.8 °C) (12/04/18 1158)  Pulse: 85 (12/04/18 1200)  Resp: 18 (12/04/18 1158)  BP: (!) 116/55 (12/04/18 1158)  SpO2: 95 % (12/04/18 0700)   Vital Signs (72h Range):  Temp:  [96.8 °F (36 °C)-100.6 °F (38.1 °C)]   Pulse:  []   Resp:  [18-24]   BP: (116-198)/(55-88)   SpO2:  [94 %-100 %]      Recent Labs   Lab 11/29/18  1321 12/02/18  0346 12/04/18  0728   CREATININE 1.1 0.9 1.8*     Serum creatinine: 1.8 mg/dL (H) 12/04/18 0728  Estimated creatinine clearance: 28.8 mL/min (A)    Medication lovenox dose: 40 mg frequency q24h will be changed to medication lovenox dose 30 mg frequency q24h    Pharmacist's Name: Vidhi Carroll  Pharmacist's Extension:  [phone]

## 2018-12-04 NOTE — PROGRESS NOTES
Introduced as VN for night shift that will be working with floor nurse and nursing assistant.  Educated patient on VN's role in patient care. Plan of care reviewed with patient. Education per flowsheet.  Patient drowsy and giving one work answers or grunting in approval or disapproval.  Opportunity given for questions and no questions at this time.  Instructed to call for assistance.  Will cont to monitor.

## 2018-12-04 NOTE — PROGRESS NOTES
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 Patient was seen by psychiatry yesterday and rehab treatment options were discussed. She status pain has improved this AM. Tolerating diet. Denies urinary symptoms     Scheduled Meds:   bumetanide  0.5 mg Oral Daily    chlorthalidone  25 mg Oral Daily    enoxparin  40 mg Subcutaneous Q24H    famotidine  20 mg Oral Q12H    gabapentin  100 mg Oral BID    losartan  50 mg Oral BID    metoprolol tartrate  50 mg Oral BID    NIFEdipine  60 mg Oral Daily     Continuous Infusions:  PRN Meds:.labetalol, LORazepam, naloxone, ondansetron    Objective:      Temp:  [96.8 °F (36 °C)-100.3 °F (37.9 °C)] 100.3 °F (37.9 °C)  Pulse:  [] 97  Resp:  [18-19] 19  SpO2:  [94 %-98 %] 95 %  BP: (125-143)/(57-64) 135/61  Weight change: -3.2 kg (-0.9 oz)    Intake/Output Summary (Last 24 hours) at 12/4/2018 0806  Last data filed at 12/4/2018 0331  Gross per 24 hour   Intake --   Output 1125 ml   Net -1125 ml       Physical Exam:  GEN: awake, alert, NAD   CV: RRR   PULM: lungs CTA B/L   ABD: soft, mild diffuse ttp, nd   EXT: Peripheral pulses present and equal bilaterally    No results for input(s): NA, K, CO2, CL, BUN, CREATININE, GLUCOSE, CALCIUM, PROT, AST, ALT, ALKPHOS, BILITOT in the last 24 hours.    Invalid input(s):  ALBUMIN  No results for input(s): WBC, HGB, HCT, PLT in the last 24 hours.    Significant Imaging: I have reviewed all pertinent imaging results/findings within the past 24 hours.     Assessment and Plan:      69yo F s/p TACE for mNET,  Also with chronic pain, on high dose narcotics outpt as well as poorly controled HTN    - vitals reviewed, BP controlled. Continue current regimen. Labs pending   - will discuss with CM today. Plan is to transfer patient [facility] today if family agrees  - continue to hold narcotic pain meds   - regular diet   - ambulation   - UA with evidence of UTI. Asymptomatic. Will treat if culture grows micro     Miriam Wilson MD  Neuroendocrine Surgery  C: [phone]

## 2018-12-05 ENCOUNTER — TELEPHONE (OUTPATIENT)
Dept: NEUROLOGY | Facility: HOSPITAL | Age: 68
End: 2018-12-05

## 2018-12-05 NOTE — TELEPHONE ENCOUNTER
----- Message from Angela Cuba sent at 12/5/2018 12:06 PM CST -----  Contact: Patients Dallas esquivel  BRIAN- Patients son Dallas would like to know if the patient should be taking Creon. Please call Dallas at [phone].

## 2018-12-05 NOTE — TELEPHONE ENCOUNTER
Returned pts son call. Inquiring if pt should be taking creon. Advised that pt was not told to stop taking and she could continue. Dallas verbalizes understanding.

## 2018-12-05 NOTE — TELEPHONE ENCOUNTER
----- Message from Katalina Ibanez sent at 12/5/2018  2:03 PM CST -----  Pt is requesting a call back. Pt can be reached at [phone]

## 2018-12-06 ENCOUNTER — HOSPITAL ENCOUNTER (OUTPATIENT)
Facility: HOSPITAL | Age: 68
Discharge: HOME | End: 2018-12-08
Admitting: SURGERY
Payer: MEDICARE

## 2018-12-06 DIAGNOSIS — N39.0 BACTERIAL UTI: ICD-10-CM

## 2018-12-06 DIAGNOSIS — Z71.89 GOALS OF CARE, COUNSELING/DISCUSSION: ICD-10-CM

## 2018-12-06 DIAGNOSIS — R55 SYNCOPE, UNSPECIFIED SYNCOPE TYPE: ICD-10-CM

## 2018-12-06 DIAGNOSIS — N39.0 URINARY TRACT INFECTION WITHOUT HEMATURIA, SITE UNSPECIFIED: ICD-10-CM

## 2018-12-06 DIAGNOSIS — Z51.5 PALLIATIVE CARE ENCOUNTER: ICD-10-CM

## 2018-12-06 DIAGNOSIS — Z71.9 ENCOUNTER FOR EDUCATION: ICD-10-CM

## 2018-12-06 DIAGNOSIS — F11.90 NARCOTIC DRUG USE: ICD-10-CM

## 2018-12-06 DIAGNOSIS — R53.1 WEAKNESS: ICD-10-CM

## 2018-12-06 DIAGNOSIS — N17.9 AKI (ACUTE KIDNEY INJURY): Primary | ICD-10-CM

## 2018-12-06 DIAGNOSIS — A49.9 BACTERIAL UTI: ICD-10-CM

## 2018-12-06 PROBLEM — N30.01 ACUTE CYSTITIS WITH HEMATURIA: Status: RESOLVED | Noted: 2018-10-08 | Resolved: 2018-12-06

## 2018-12-06 LAB
ALBUMIN SERPL BCP-MCNC: 2.3 G/DL
ALBUMIN SERPL BCP-MCNC: 2.9 G/DL
ALP SERPL-CCNC: 100 U/L
ALP SERPL-CCNC: 80 U/L
ALT SERPL W/O P-5'-P-CCNC: 10 U/L
ALT SERPL W/O P-5'-P-CCNC: 16 U/L
ANION GAP SERPL CALC-SCNC: 12 MMOL/L
ANION GAP SERPL CALC-SCNC: 14 MMOL/L
ANISOCYTOSIS BLD QL SMEAR: SLIGHT
AST SERPL-CCNC: 13 U/L
AST SERPL-CCNC: 18 U/L
BACTERIA #/AREA URNS HPF: ABNORMAL /HPF
BACTERIA UR CULT: NORMAL
BASOPHILS # BLD AUTO: 0.01 K/UL
BASOPHILS NFR BLD: 0 %
BASOPHILS NFR BLD: 0.1 %
BILIRUB SERPL-MCNC: 0.3 MG/DL
BILIRUB SERPL-MCNC: 0.5 MG/DL
BILIRUB UR QL STRIP: NEGATIVE
BUN SERPL-MCNC: 50 MG/DL
BUN SERPL-MCNC: 73 MG/DL
BURR CELLS BLD QL SMEAR: ABNORMAL
CALCIUM SERPL-MCNC: 10.3 MG/DL
CALCIUM SERPL-MCNC: 9.2 MG/DL
CHLORIDE SERPL-SCNC: 100 MMOL/L
CHLORIDE SERPL-SCNC: 93 MMOL/L
CLARITY UR: ABNORMAL
CO2 SERPL-SCNC: 28 MMOL/L
CO2 SERPL-SCNC: 30 MMOL/L
COLOR UR: ABNORMAL
CREAT SERPL-MCNC: 1.8 MG/DL
CREAT SERPL-MCNC: 3.1 MG/DL
DIFFERENTIAL METHOD: ABNORMAL
DIFFERENTIAL METHOD: ABNORMAL
EOSINOPHIL # BLD AUTO: 0 K/UL
EOSINOPHIL NFR BLD: 0 %
EOSINOPHIL NFR BLD: 0.2 %
ERYTHROCYTE [DISTWIDTH] IN BLOOD BY AUTOMATED COUNT: 15.3 %
ERYTHROCYTE [DISTWIDTH] IN BLOOD BY AUTOMATED COUNT: 15.5 %
EST. GFR  (AFRICAN AMERICAN): 17 ML/MIN/1.73 M^2
EST. GFR  (AFRICAN AMERICAN): 33 ML/MIN/1.73 M^2
EST. GFR  (NON AFRICAN AMERICAN): 15 ML/MIN/1.73 M^2
EST. GFR  (NON AFRICAN AMERICAN): 29 ML/MIN/1.73 M^2
GLUCOSE SERPL-MCNC: 169 MG/DL
GLUCOSE SERPL-MCNC: 199 MG/DL
GLUCOSE UR QL STRIP: NEGATIVE
GRAM STN SPEC: NORMAL
GRAM STN SPEC: NORMAL
HCT VFR BLD AUTO: 27.3 %
HCT VFR BLD AUTO: 28.3 %
HGB BLD-MCNC: 8.3 G/DL
HGB BLD-MCNC: 8.8 G/DL
HGB UR QL STRIP: ABNORMAL
HYALINE CASTS #/AREA URNS LPF: 2 /LPF
HYPOCHROMIA BLD QL SMEAR: ABNORMAL
KETONES UR QL STRIP: NEGATIVE
LEUKOCYTE ESTERASE UR QL STRIP: ABNORMAL
LYMPHOCYTES # BLD AUTO: 1.6 K/UL
LYMPHOCYTES NFR BLD: 14 %
LYMPHOCYTES NFR BLD: 16.7 %
MAGNESIUM SERPL-MCNC: 2 MG/DL
MCH RBC QN AUTO: 26.2 PG
MCH RBC QN AUTO: 26.5 PG
MCHC RBC AUTO-ENTMCNC: 30.4 G/DL
MCHC RBC AUTO-ENTMCNC: 31.1 G/DL
MCV RBC AUTO: 85 FL
MCV RBC AUTO: 86 FL
MICROSCOPIC COMMENT: ABNORMAL
MONOCYTES # BLD AUTO: 0.9 K/UL
MONOCYTES NFR BLD: 17 %
MONOCYTES NFR BLD: 9.4 %
NEUTROPHILS # BLD AUTO: 6.9 K/UL
NEUTROPHILS NFR BLD: 68 %
NEUTROPHILS NFR BLD: 73.1 %
NEUTS BAND NFR BLD MANUAL: 1 %
NITRITE UR QL STRIP: POSITIVE
OVALOCYTES BLD QL SMEAR: ABNORMAL
PH UR STRIP: 6 [PH] (ref 5–8)
PHOSPHATE SERPL-MCNC: 3 MG/DL
PLATELET # BLD AUTO: 246 K/UL
PLATELET # BLD AUTO: 263 K/UL
PLATELET BLD QL SMEAR: ABNORMAL
PMV BLD AUTO: 10 FL
PMV BLD AUTO: 10.2 FL
POCT GLUCOSE: 212 MG/DL (ref 70–110)
POIKILOCYTOSIS BLD QL SMEAR: SLIGHT
POTASSIUM SERPL-SCNC: 3.4 MMOL/L
POTASSIUM SERPL-SCNC: 3.5 MMOL/L
PROT SERPL-MCNC: 7 G/DL
PROT SERPL-MCNC: 8.4 G/DL
PROT UR QL STRIP: ABNORMAL
RBC # BLD AUTO: 3.17 M/UL
RBC # BLD AUTO: 3.32 M/UL
RBC #/AREA URNS HPF: 4 /HPF (ref 0–4)
SODIUM SERPL-SCNC: 137 MMOL/L
SODIUM SERPL-SCNC: 140 MMOL/L
SP GR UR STRIP: 1.02 (ref 1–1.03)
SQUAMOUS #/AREA URNS HPF: 3 /HPF
TARGETS BLD QL SMEAR: ABNORMAL
URN SPEC COLLECT METH UR: ABNORMAL
UROBILINOGEN UR STRIP-ACNC: NEGATIVE EU/DL
WBC # BLD AUTO: 8.61 K/UL
WBC # BLD AUTO: 9.47 K/UL
WBC #/AREA URNS HPF: >100 /HPF (ref 0–5)
WBC CLUMPS URNS QL MICRO: ABNORMAL

## 2018-12-06 PROCEDURE — 87088 URINE BACTERIA CULTURE: CPT

## 2018-12-06 PROCEDURE — 87205 SMEAR GRAM STAIN: CPT

## 2018-12-06 PROCEDURE — 25000003 PHARM REV CODE 250: Performed by: SURGERY

## 2018-12-06 PROCEDURE — G0378 HOSPITAL OBSERVATION PER HR: HCPCS

## 2018-12-06 PROCEDURE — 25000003 PHARM REV CODE 250: Performed by: STUDENT IN AN ORGANIZED HEALTH CARE EDUCATION/TRAINING PROGRAM

## 2018-12-06 PROCEDURE — 96365 THER/PROPH/DIAG IV INF INIT: CPT

## 2018-12-06 PROCEDURE — 94761 N-INVAS EAR/PLS OXIMETRY MLT: CPT

## 2018-12-06 PROCEDURE — 25000003 PHARM REV CODE 250: Performed by: HOSPITALIST

## 2018-12-06 PROCEDURE — 80053 COMPREHEN METABOLIC PANEL: CPT | Mod: 91

## 2018-12-06 PROCEDURE — 36415 COLL VENOUS BLD VENIPUNCTURE: CPT

## 2018-12-06 PROCEDURE — 80053 COMPREHEN METABOLIC PANEL: CPT

## 2018-12-06 PROCEDURE — 85025 COMPLETE CBC W/AUTO DIFF WBC: CPT

## 2018-12-06 PROCEDURE — 82962 GLUCOSE BLOOD TEST: CPT

## 2018-12-06 PROCEDURE — 87186 SC STD MICRODIL/AGAR DIL: CPT

## 2018-12-06 PROCEDURE — 25000003 PHARM REV CODE 250

## 2018-12-06 PROCEDURE — 87086 URINE CULTURE/COLONY COUNT: CPT

## 2018-12-06 PROCEDURE — 63600175 PHARM REV CODE 636 W HCPCS

## 2018-12-06 PROCEDURE — 84100 ASSAY OF PHOSPHORUS: CPT

## 2018-12-06 PROCEDURE — 63600175 PHARM REV CODE 636 W HCPCS: Performed by: HOSPITALIST

## 2018-12-06 PROCEDURE — 87077 CULTURE AEROBIC IDENTIFY: CPT

## 2018-12-06 PROCEDURE — 81000 URINALYSIS NONAUTO W/SCOPE: CPT

## 2018-12-06 PROCEDURE — 85007 BL SMEAR W/DIFF WBC COUNT: CPT

## 2018-12-06 PROCEDURE — 85027 COMPLETE CBC AUTOMATED: CPT

## 2018-12-06 PROCEDURE — 96361 HYDRATE IV INFUSION ADD-ON: CPT

## 2018-12-06 PROCEDURE — 93010 ELECTROCARDIOGRAM REPORT: CPT | Mod: ,,, | Performed by: INTERNAL MEDICINE

## 2018-12-06 PROCEDURE — 93005 ELECTROCARDIOGRAM TRACING: CPT

## 2018-12-06 PROCEDURE — 83735 ASSAY OF MAGNESIUM: CPT

## 2018-12-06 PROCEDURE — 99285 EMERGENCY DEPT VISIT HI MDM: CPT | Mod: 25

## 2018-12-06 RX ORDER — SODIUM CHLORIDE 0.9 % (FLUSH) 0.9 %
3 SYRINGE (ML) INJECTION
Status: DISCONTINUED | OUTPATIENT
Start: 2018-12-06 | End: 2018-12-08 | Stop reason: HOSPADM

## 2018-12-06 RX ORDER — HEPARIN SODIUM 5000 [USP'U]/ML
5000 INJECTION, SOLUTION INTRAVENOUS; SUBCUTANEOUS EVERY 8 HOURS
Status: DISCONTINUED | OUTPATIENT
Start: 2018-12-06 | End: 2018-12-08 | Stop reason: HOSPADM

## 2018-12-06 RX ORDER — HALOPERIDOL 5 MG/ML
5 INJECTION INTRAMUSCULAR
Status: DISCONTINUED | OUTPATIENT
Start: 2018-12-06 | End: 2018-12-06

## 2018-12-06 RX ORDER — LOSARTAN POTASSIUM 50 MG/1
50 TABLET ORAL DAILY
Status: DISCONTINUED | OUTPATIENT
Start: 2018-12-06 | End: 2018-12-06

## 2018-12-06 RX ORDER — NIFEDIPINE 10 MG/1
20 CAPSULE ORAL EVERY 8 HOURS
Status: DISCONTINUED | OUTPATIENT
Start: 2018-12-06 | End: 2018-12-08 | Stop reason: HOSPADM

## 2018-12-06 RX ORDER — FERROUS GLUCONATE 324(38)MG
324 TABLET ORAL
Status: DISCONTINUED | OUTPATIENT
Start: 2018-12-06 | End: 2018-12-08 | Stop reason: HOSPADM

## 2018-12-06 RX ORDER — TRAMADOL HYDROCHLORIDE 50 MG/1
50 TABLET ORAL EVERY 6 HOURS PRN
Status: DISCONTINUED | OUTPATIENT
Start: 2018-12-06 | End: 2018-12-08 | Stop reason: HOSPADM

## 2018-12-06 RX ORDER — SODIUM CHLORIDE 9 MG/ML
INJECTION, SOLUTION INTRAVENOUS CONTINUOUS
Status: DISCONTINUED | OUTPATIENT
Start: 2018-12-06 | End: 2018-12-08

## 2018-12-06 RX ORDER — SODIUM CHLORIDE 9 MG/ML
INJECTION, SOLUTION INTRAVENOUS CONTINUOUS
Status: DISCONTINUED | OUTPATIENT
Start: 2018-12-06 | End: 2018-12-06

## 2018-12-06 RX ORDER — METOPROLOL TARTRATE 50 MG/1
50 TABLET ORAL 2 TIMES DAILY
Status: DISCONTINUED | OUTPATIENT
Start: 2018-12-06 | End: 2018-12-08 | Stop reason: HOSPADM

## 2018-12-06 RX ORDER — LANREOTIDE ACETATE 120 MG/.5ML
120 INJECTION SUBCUTANEOUS
Status: DISCONTINUED | OUTPATIENT
Start: 2018-12-06 | End: 2018-12-06

## 2018-12-06 RX ORDER — GABAPENTIN 100 MG/1
100 CAPSULE ORAL 2 TIMES DAILY
Status: DISCONTINUED | OUTPATIENT
Start: 2018-12-06 | End: 2018-12-08 | Stop reason: HOSPADM

## 2018-12-06 RX ORDER — LANOLIN ALCOHOL/MO/W.PET/CERES
400 CREAM (GRAM) TOPICAL DAILY
Status: DISCONTINUED | OUTPATIENT
Start: 2018-12-06 | End: 2018-12-08 | Stop reason: HOSPADM

## 2018-12-06 RX ORDER — ACETAMINOPHEN 325 MG/1
650 TABLET ORAL
Status: DISPENSED | OUTPATIENT
Start: 2018-12-06 | End: 2018-12-06

## 2018-12-06 RX ADMIN — METOPROLOL TARTRATE 50 MG: 50 TABLET ORAL at 09:12

## 2018-12-06 RX ADMIN — SODIUM CHLORIDE 1000 ML: 0.9 INJECTION, SOLUTION INTRAVENOUS at 04:12

## 2018-12-06 RX ADMIN — HEPARIN SODIUM 5000 UNITS: 5000 INJECTION, SOLUTION INTRAVENOUS; SUBCUTANEOUS at 01:12

## 2018-12-06 RX ADMIN — METOPROLOL TARTRATE 50 MG: 50 TABLET ORAL at 11:12

## 2018-12-06 RX ADMIN — HEPARIN SODIUM 5000 UNITS: 5000 INJECTION, SOLUTION INTRAVENOUS; SUBCUTANEOUS at 09:12

## 2018-12-06 RX ADMIN — NIFEDIPINE 20 MG: 10 CAPSULE, LIQUID FILLED ORAL at 09:12

## 2018-12-06 RX ADMIN — MAGNESIUM OXIDE TAB 400 MG (241.3 MG ELEMENTAL MG) 400 MG: 400 (241.3 MG) TAB at 11:12

## 2018-12-06 RX ADMIN — SODIUM CHLORIDE 125 ML/HR: 0.9 INJECTION, SOLUTION INTRAVENOUS at 07:12

## 2018-12-06 RX ADMIN — SODIUM CHLORIDE 1000 ML: 0.9 INJECTION, SOLUTION INTRAVENOUS at 06:12

## 2018-12-06 RX ADMIN — NIFEDIPINE 20 MG: 10 CAPSULE, LIQUID FILLED ORAL at 01:12

## 2018-12-06 RX ADMIN — TRAMADOL HYDROCHLORIDE 50 MG: 50 TABLET, COATED ORAL at 10:12

## 2018-12-06 RX ADMIN — GABAPENTIN 100 MG: 100 CAPSULE ORAL at 11:12

## 2018-12-06 RX ADMIN — GABAPENTIN 100 MG: 100 CAPSULE ORAL at 09:12

## 2018-12-06 RX ADMIN — CEFTRIAXONE 1 G: 1 INJECTION, SOLUTION INTRAVENOUS at 06:12

## 2018-12-06 NOTE — H&P
[facility] Neuroendocrine Surgery/General Surgery  History & Physical    SUBJECTIVE:     Chief Complaint: syncopal episode    History of Present Illness: 68 y.o. 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 The patient presents to the ED via EMS due to AMS that occurred prior to arrival. EMS reports patient had a syncopal episode while on the toilet. Patient is a voluntary admit to [facility]    The patient recently underwent TACE and was hospitalized following the procedure. She was discharged to [facility] for opioid drug rehabilitation. She presented to the ED with syncope and was found to be dehydrated with acute kidney injury.         Allergies:  Review of patient's allergies indicates:   Allergen Reactions    Epinephrine Anaphylaxis     Can cause  a Carcinoid Crisis    Contrast media Hives, Itching and Swelling    Ibuprofen Hives, Itching and Swelling    Iodinated contrast- oral and iv dye     Sulfa (sulfonamide antibiotics) Hives, Itching and Swelling       Home Medications:  No current facility-administered medications on file prior to encounter.      Current Outpatient Medications on File Prior to Encounter   Medication Sig    bumetanide (BUMEX) 0.5 MG Tab Take 1 tablet (0.5 mg total) by mouth once daily. (Patient taking differently: Take 0.5 mg by mouth daily as needed. )    ferrous gluconate 324 mg (36 mg iron) Tab Take 1 tablet by mouth once daily.    gabapentin (NEURONTIN) 100 MG capsule takes one capsule BID    iron-vit c-vit b12-folic acid (IRON-C PLUS) tablet Take 1 tablet by mouth once daily.    lanreotide (SOMATULINE DEPOT) 120 mg/0.5 mL Syrg Inject 120 mg into the skin every 28 days.    lipase-protease-amylase 12,000-38,000-60,000 units (CREON) CpDR Take 1 capsule by mouth 3 (three) times daily  with meals.    losartan (COZAAR) 50 MG tablet TAKE 1 TABLET BY MOUTH DAILY    magnesium oxide (MAG-OX) 400 mg tablet Take 1 tablet (400 mg total) by mouth once daily.    metoprolol tartrate (LOPRESSOR) 50 MG tablet Take 1 tablet (50 mg total) by mouth 2 (two) times daily.    walker (ULTRA-LIGHT ROLLATOR) Misc 1 Units by Misc.(Non-Drug; Combo Route) route once daily.       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)      Past Surgical History:   Procedure Laterality Date    CATARACT EXTRACTION Left 10/2017    CHOLECYSTECTOMY      CHOLECYSTECTOMY N/A 12/30/2017    [facility]    COLON SURGERY      cystoscope      EYE SURGERY      HYSTERECTOMY  5/1996    INSERTION-INTRAOCULAR LENS (IOL) Left 10/4/2017    Performed by [facility] Em [facility] at [facility] OR    LITHOTRIPSY      LIVER BIOPSY  9/14    carcinoid    PHACOEMULSIFICATION-ASPIRATION-CATARACT Left 10/4/2017    Performed by [facility] at [facility] OR     Family History   Problem Relation Age of Onset    Cancer Mother         unknown    Alzheimer's disease Father     Stroke Sister     No Known Problems Son     No Known Problems Son     No Known Problems Son     No Known Problems Son     Kidney disease Neg Hx      Social History     Tobacco Use    Smoking status: Never Smoker    Smokeless tobacco: Never Used   Substance Use Topics    Alcohol use: No    Drug use: No        Review of Systems:  Constitutional: no fever or chills  Eyes: no visual changes  ENT: no nasal congestion or sore throat  Respiratory: no cough or shortness of breath  Cardiovascular: no chest pain or palpitations  Gastrointestinal: no nausea or vomiting, no abdominal pain or change in bowel habits  Genitourinary: no hematuria or  dysuria  Integument/Breast: no rash or pruritis  Hematologic/Lymphatic: no easy bruising or lymphadenopathy  Musculoskeletal: no arthralgias or myalgias  Neurological: no seizures or tremors  Behavioral/Psych: no auditory or visual hallucinations  Endocrine: no heat or cold intolerance    OBJECTIVE:     Vital Signs:  Temp: 97.9 °F (36.6 °C) (12/06/18 0706)  Pulse: 100 (12/06/18 0706)  Resp: (!) 21 (12/06/18 0706)  BP: (!) 150/67 (12/06/18 0706)  SpO2: 100 % (12/06/18 0706)    Physical Exam:  General: well developed, well nourished, no distress  HEENT: normocephalic, atraumatic, hearing grossly normal bilaterally, mucous membranes moist, EOM intact, no scleral icterus  Neck: supple, symmetrical, trachea midline, no JVD  Lungs:  clear to auscultation bilaterally and normal respiratory effort  Cardiovascular: regular rate and rhythm.  Extremities: no cyanosis or edema, or clubbing, distal pulses palpable and symmetric  Abdomen: soft, non-tender to palpation, no distention, no masses/hernias  Skin: Skin color, texture, turgor normal. No rashes or lesions  Musculoskeletal:no clubbing, cyanosis, no deformities  Neurologic: No focal numbness or weakness  Psych/Behavioral:  Alert and oriented, appropriate affect.    Laboratory:  Labs Reviewed   COMPREHENSIVE METABOLIC PANEL - Abnormal; Notable for the following components:       Result Value    Chloride 93 (*)     CO2 30 (*)     Glucose 169 (*)     BUN, Bld 73 (*)     Creatinine 3.1 (*)     Albumin 2.9 (*)     eGFR if  17 (*)     eGFR if non  15 (*)     All other components within normal limits   URINALYSIS, REFLEX TO URINE CULTURE - Abnormal; Notable for the following components:    Appearance, UA Hazy (*)     Protein, UA Trace (*)     Occult Blood UA 1+ (*)     Nitrite, UA Positive (*)     Leukocytes, UA 3+ (*)     All other components within normal limits    Narrative:     Preferred Collection Type->Urine, Clean Catch   CBC W/ AUTO  DIFFERENTIAL - Abnormal; Notable for the following components:    RBC 3.32 (*)     Hemoglobin 8.8 (*)     Hematocrit 28.3 (*)     MCH 26.5 (*)     MCHC 31.1 (*)     RDW 15.5 (*)     Lymph% 14.0 (*)     Mono% 17.0 (*)     All other components within normal limits   URINALYSIS MICROSCOPIC - Abnormal; Notable for the following components:    WBC, UA >100 (*)     WBC Clumps, UA Occasional (*)     Bacteria, UA Many (*)     Hyaline Casts, UA 2 (*)     All other components within normal limits    Narrative:     Preferred Collection Type->Urine, Clean Catch   POCT GLUCOSE - Abnormal; Notable for the following components:    POCT Glucose 212 (*)     All other components within normal limits   CULTURE, URINE   GRAM STAIN   CULTURE, URINE   GRAM STAIN       Diagnostic Results:  Imaging Results    None         ASSESSMENT:     68 year old female with history of NET s/p TACE with recent hospitalization at [facility] for opioid drug abuse who presents with syncopal episode, dehydration, and FLORECITA    PLAN:     -admit to surgery service  -IVF resuscitation. Getting 2L crystalloid bolus and mIVF   -restart home meds   -monitor BP   -no opioid pain meds   -okay for regular diet     Miriam Wilson MD   [facility] General Surgery PGY2

## 2018-12-06 NOTE — NURSING
Family at bedside requesting to speak to MD.  NET team is in OR at this time.  Called Dr. Perry who is following for medical management, states she will go see the patient and family.

## 2018-12-06 NOTE — ED PROVIDER NOTES
Encounter Date: 12/6/2018    SCRIBE #1 NOTE: I, Jose De Jesus Ramirez, am scribing for, and in the presence of,  Dr. Abdirashid Beach. I have scribed the entire note.       History     Chief Complaint   Patient presents with    Altered Mental Status     pt arrived with weakness per  ems from [facility]; syncopal episode reported while onn toilet pta; pt is a voluntary admit to [facility]; pt is awake aware and oriented and denies pain and has general weakness    Loss of Consciousness     Patito TOBIAS Domingo is a 68 y.o. female who  has a past medical history of Cataract, Colon cancer, HTN (hypertension), Kidney stones (2014), 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, Pyelonephritis, acute, and 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).    The patient presents to the ED via EMS due to AMS that occurred prior to arrival. EMS reports patient had a syncopal episode while on the toilet. Patient is a voluntary admit to [facility]. She was reported to arrive with generalized weakness. Patient denies any pain or trouble breathing. No other palliative or provocative factors except as noted.      The history is provided by the patient.     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)      Past Surgical History:   Procedure Laterality Date    CATARACT EXTRACTION Left 10/2017    CHOLECYSTECTOMY      CHOLECYSTECTOMY N/A 12/30/2017    [facility] at [facility] OR     COLON SURGERY      cystoscope      EYE SURGERY      HYSTERECTOMY  5/1996    INSERTION-INTRAOCULAR LENS (IOL) Left 10/4/2017    [facility] OR    LITHOTRIPSY      LIVER BIOPSY  9/14    carcinoid    PHACOEMULSIFICATION-ASPIRATION-CATARACT Left 10/4/2017    [facility] at [facility] OR     Family History   Problem Relation Age of Onset    Cancer Mother         unknown    Alzheimer's disease Father     Stroke Sister     No Known Problems Son     No Known Problems Son     No Known Problems Son     No Known Problems Son     Kidney disease Neg Hx      Social History     Tobacco Use    Smoking status: Never Smoker    Smokeless tobacco: Never Used   Substance Use Topics    Alcohol use: No    Drug use: No     Review of Systems   All other systems reviewed and are negative.      Physical Exam     Initial Vitals [12/06/18 0234]   BP Pulse Resp Temp SpO2   (!) 118/57 95 18 97.7 °F (36.5 °C) 95 %      MAP       --         Physical Exam    Nursing note and vitals reviewed.  Constitutional: She appears well-developed.   HENT:   Head: Normocephalic and atraumatic.   Mouth/Throat: Mucous membranes are dry.   Eyes: EOM are normal.   Neck: Normal range of motion. Neck supple.   Cardiovascular: Regular rhythm.   Pulmonary/Chest: No respiratory distress.   Abdominal: She exhibits no distension.   Musculoskeletal:   No acute deformity   Neurological: She is alert and oriented to person, place, and time. She has normal strength and normal reflexes. No sensory deficit.   Skin: Skin is warm and dry.   Psychiatric: She has a normal mood and affect.         ED Course   Procedures  Labs Reviewed   URINALYSIS, REFLEX TO URINE CULTURE - Abnormal; Notable for the following components:       Result Value    Appearance, UA Hazy (*)     Protein, UA Trace (*)     Occult Blood UA 1+ (*)     Nitrite, UA Positive (*)     Leukocytes, UA 3+ (*)     All other components within normal limits     Narrative:     Preferred Collection Type->Urine, Clean Catch   CBC W/ AUTO DIFFERENTIAL - Abnormal; Notable for the following components:    RBC 3.32 (*)     Hemoglobin 8.8 (*)     Hematocrit 28.3 (*)     MCH 26.5 (*)     MCHC 31.1 (*)     RDW 15.5 (*)     Lymph% 14.0 (*)     Mono% 17.0 (*)     All other components within normal limits   URINALYSIS MICROSCOPIC - Abnormal; Notable for the following components:    WBC, UA >100 (*)     WBC Clumps, UA Occasional (*)     Bacteria, UA Many (*)     Hyaline Casts, UA 2 (*)     All other components within normal limits    Narrative:     Preferred Collection Type->Urine, Clean Catch   POCT GLUCOSE - Abnormal; Notable for the following components:    POCT Glucose 212 (*)     All other components within normal limits   CULTURE, URINE   COMPREHENSIVE METABOLIC PANEL     EKG Readings: (Independently Interpreted)   EKG: Sinus rhythm. Rate: 95 bpm. Diffuse T wave inversion noted, most prominent in inferior and lateral leads. No ectopy, although some baseline artifact limits interpretation. No STEMI. Abnormal EKG.       Imaging Results    None          Medical Decision Making:   Initial Assessment:   Patient is sleeping but easily arousable, neuro exam intact. Differential diagnosis includes dehydration, medication toxidrome, infectious process, electrolyte imbalance, etc.  ED Management:  Patient continues to sleep but remains easily arousable and appropriate.  Studies consistent with UTI.  Antibiotics in and IV fluids initiated; plan to reassess after administration.  Consider medical clearance to return to [facility] if no orthostatics changes or other concerning findings.    Turned over to oncoming physician awaiting remainder of study results and final disposition.                      Clinical Impression:     1. Urinary tract infection without hematuria, site unspecified    2. Weakness                I, Abdirashid Beach, personally performed the services described in this  documentation. All medical record entries made by the scribe were at my direction and in my presence.  I have reviewed the chart and agree that the record reflects my personal performance and is accurate and complete within the limitations of emergency medical charting. Abdirashid Beach M.D.               Abdirashid Beach MD  12/06/18 9540

## 2018-12-06 NOTE — PLAN OF CARE
12/06/18 1440   WHITE Message   Medicare Outpatient and Observation Notification regarding financial responsibility Given to patient/caregiver;Explained to patient/caregiver;Signed/date by patient/caregiver   Date WHITE was signed 12/06/18   Time WHITE was signed 1400

## 2018-12-06 NOTE — PLAN OF CARE
Problem: Patient Care Overview  Goal: Plan of Care Review  Outcome: Ongoing (interventions implemented as appropriate)  AAOx4, VSS, NAD.  IVFs infusing per MAR.  Tele in place, NSR.  Pt educated on low sodium diet, tolerating well.  C/o pain to back, warm compress provided, MD aware.  Urinating on bed pan.  Medications administered per MAR.  Bed alarm set.  Instructed to call for assistance.  Safety maintained.  Will continue to monitor.

## 2018-12-06 NOTE — CONSULTS
Consult Note  Palliative Care    Thank you for opportunity to participate in Ms. Domingo' care    Consult Requested By: Chris Herbert MD  Reason for Consult: Goals of care discussion/advance care planning    SUBJECTIVE:     History of Present Illness:  Disease Process: FLORECITA Domingo is a 68 y.o. female who  has a past medical history of Cataract, Colon cancer, HTN (hypertension), Kidney stones (2014), 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, Pyelonephritis, acute, and 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).     The patient presents to the ED via EMS due to AMS that occurred prior to arrival. EMS reports patient had a syncopal episode while on the toilet. Patient is a voluntary admit to [facility]. She was reported to arrive with generalized weakness. Patient denies any pain or trouble breathing. No other palliative or provocative factors except as noted    Palliative care has bene consulted for goals of care discussion/advance care planning.    Palliative met with patient and family (sons) by bed side to establish rapport, provide education about palliative care and assess family understanding of current clinical condition and its relationship to goals of care. Family verbalized they do not understanding patient clinical status and diagnosis. Educated patient and family on the reason for admission and treatment. Questions and concerned addressed. Patient educated on diet per family request. Emotional comfort provided.         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)      Past Surgical History:   Procedure Laterality Date    CATARACT EXTRACTION Left 10/2017    CHOLECYSTECTOMY      CHOLECYSTECTOMY N/A 12/30/2017    Performed by Chris Herbert,  MD at [facility] OR    COLON SURGERY      cystoscope      EYE SURGERY      HYSTERECTOMY  5/1996    INSERTION-INTRAOCULAR LENS (IOL) Left 10/4/2017    [facility] at [facility] OR    LITHOTRIPSY      LIVER BIOPSY  9/14    carcinoid    PHACOEMULSIFICATION-ASPIRATION-CATARACT Left 10/4/2017    [facility] by [facility] at [facility] OR     Family History   Problem Relation Age of Onset    Cancer Mother         unknown    Alzheimer's disease Father     Stroke Sister     No Known Problems Son     No Known Problems Son     No Known Problems Son     No Known Problems Son     Kidney disease Neg Hx      Social History     Tobacco Use    Smoking status: Never Smoker    Smokeless tobacco: Never Used   Substance Use Topics    Alcohol use: No    Drug use: No       Mental Status: Oriented x3    ECOG Performance Status Grade: 3 - Confined to bed or chair 50% of waking hours    Review of Systems:  Constitutional: no fever  Respiratory: no SOB  Gastrointestinal: No nasuea, vomiting.   Musculoskeletal: na back pain, joint pain  Neurological: no headaches no dizziness.      OBJECTIVE:     Pain Assessment: 5    Decision-Making Capacity: Patient answered questions, Family answered questions    Advanced Directives:  Living Will: No  Do Not Resuscitate Status: No  Medical Power of : No  Registered Organ Donor: No    Living Arrangements: [facility]    Psychosocial, Spiritual, Cultural:  Patient's most important priorities:  Discharge home    Patient's biggest concerns/fears:  none    Previous death/end of life care history:  none    Patient's goals/hopes:  Discharge home    ASSESSMENT/PLAN:     Recommendations:  Continue medical treatment  Code status: Full code  Discharge option will be to home  Family wants patient to increase water intake and NOT cold sodas.  Palliative care will continue to assist family with goals of care as needed.       Thank you for opportunity to participate in Ms.  Chayo' care.       Thank you for this consult and the opportunity to participate in Ms. Bhavna liriano.    Noemí Tovar MD, MPH  Palliative Medicine  [facility]   [phone]      > 50% of 60 min visit spent in chart review, face to face discussion of goals of care with patient and family, symptom assessment, coordination of care and emotional support.  .

## 2018-12-06 NOTE — NURSING
Admit assessment attempted.  Patient constantly asking for pain medications.  Dr. Wilson notified, who states that they had the conversation with her that she was not getting opioid medications due to her recent hospitalization which resulted in her being admitted to [facility] for opioid overuse.  She stated that Tylenol was the only analgesia the patient could get.  Attempted to let patient know what MD said.  She insisted on something stronger.  During my attempts to explain to patient about her kidney issues, Dr. Perry came to bedside and took over explanation.  Patient was still not satisfied.  Will continue to monitor.

## 2018-12-07 LAB
ANION GAP SERPL CALC-SCNC: 10 MMOL/L
BASOPHILS # BLD AUTO: 0.02 K/UL
BASOPHILS NFR BLD: 0.3 %
BUN SERPL-MCNC: 35 MG/DL
CALCIUM SERPL-MCNC: 9.1 MG/DL
CHLORIDE SERPL-SCNC: 104 MMOL/L
CO2 SERPL-SCNC: 26 MMOL/L
CREAT SERPL-MCNC: 1.2 MG/DL
DIFFERENTIAL METHOD: ABNORMAL
EOSINOPHIL # BLD AUTO: 0 K/UL
EOSINOPHIL NFR BLD: 0.3 %
ERYTHROCYTE [DISTWIDTH] IN BLOOD BY AUTOMATED COUNT: 15.5 %
EST. GFR  (AFRICAN AMERICAN): 54 ML/MIN/1.73 M^2
EST. GFR  (NON AFRICAN AMERICAN): 47 ML/MIN/1.73 M^2
GLUCOSE SERPL-MCNC: 203 MG/DL
HCT VFR BLD AUTO: 25.4 %
HGB BLD-MCNC: 7.5 G/DL
LYMPHOCYTES # BLD AUTO: 1.1 K/UL
LYMPHOCYTES NFR BLD: 14.8 %
MAGNESIUM SERPL-MCNC: 1.8 MG/DL
MCH RBC QN AUTO: 25.8 PG
MCHC RBC AUTO-ENTMCNC: 29.5 G/DL
MCV RBC AUTO: 87 FL
MONOCYTES # BLD AUTO: 1.2 K/UL
MONOCYTES NFR BLD: 15.7 %
NEUTROPHILS # BLD AUTO: 5.2 K/UL
NEUTROPHILS NFR BLD: 68.2 %
PHOSPHATE SERPL-MCNC: 1.8 MG/DL
PLATELET # BLD AUTO: 284 K/UL
PMV BLD AUTO: 10.2 FL
POTASSIUM SERPL-SCNC: 3.1 MMOL/L
RBC # BLD AUTO: 2.91 M/UL
SODIUM SERPL-SCNC: 140 MMOL/L
WBC # BLD AUTO: 7.62 K/UL

## 2018-12-07 PROCEDURE — 36415 COLL VENOUS BLD VENIPUNCTURE: CPT

## 2018-12-07 PROCEDURE — 80048 BASIC METABOLIC PNL TOTAL CA: CPT

## 2018-12-07 PROCEDURE — 63600175 PHARM REV CODE 636 W HCPCS: Performed by: PSYCHIATRY & NEUROLOGY

## 2018-12-07 PROCEDURE — 25000003 PHARM REV CODE 250: Performed by: HOSPITALIST

## 2018-12-07 PROCEDURE — 94761 N-INVAS EAR/PLS OXIMETRY MLT: CPT

## 2018-12-07 PROCEDURE — 25000003 PHARM REV CODE 250: Performed by: STUDENT IN AN ORGANIZED HEALTH CARE EDUCATION/TRAINING PROGRAM

## 2018-12-07 PROCEDURE — 83735 ASSAY OF MAGNESIUM: CPT

## 2018-12-07 PROCEDURE — 63600175 PHARM REV CODE 636 W HCPCS: Performed by: HOSPITALIST

## 2018-12-07 PROCEDURE — G0378 HOSPITAL OBSERVATION PER HR: HCPCS

## 2018-12-07 PROCEDURE — 84100 ASSAY OF PHOSPHORUS: CPT

## 2018-12-07 PROCEDURE — 85025 COMPLETE CBC W/AUTO DIFF WBC: CPT

## 2018-12-07 PROCEDURE — 25000003 PHARM REV CODE 250: Performed by: SURGERY

## 2018-12-07 RX ORDER — BUPRENORPHINE AND NALOXONE 2; .5 MG/1; MG/1
2 FILM, SOLUBLE BUCCAL; SUBLINGUAL 2 TIMES DAILY
Status: DISCONTINUED | OUTPATIENT
Start: 2018-12-07 | End: 2018-12-08

## 2018-12-07 RX ORDER — POTASSIUM CHLORIDE 750 MG/1
30 TABLET, EXTENDED RELEASE ORAL 2 TIMES DAILY
Status: COMPLETED | OUTPATIENT
Start: 2018-12-07 | End: 2018-12-07

## 2018-12-07 RX ADMIN — NIFEDIPINE 20 MG: 10 CAPSULE, LIQUID FILLED ORAL at 09:12

## 2018-12-07 RX ADMIN — PANCRELIPASE 1 CAPSULE: 24000; 76000; 120000 CAPSULE, DELAYED RELEASE PELLETS ORAL at 05:12

## 2018-12-07 RX ADMIN — NIFEDIPINE 20 MG: 10 CAPSULE, LIQUID FILLED ORAL at 02:12

## 2018-12-07 RX ADMIN — POTASSIUM CHLORIDE 30 MEQ: 750 TABLET, EXTENDED RELEASE ORAL at 09:12

## 2018-12-07 RX ADMIN — GABAPENTIN 100 MG: 100 CAPSULE ORAL at 09:12

## 2018-12-07 RX ADMIN — BUPRENORPHINE HYDROCHLORIDE, NALOXONE HYDROCHLORIDE 1 EACH: 2; .5 FILM, SOLUBLE BUCCAL; SUBLINGUAL at 12:12

## 2018-12-07 RX ADMIN — HEPARIN SODIUM 5000 UNITS: 5000 INJECTION, SOLUTION INTRAVENOUS; SUBCUTANEOUS at 02:12

## 2018-12-07 RX ADMIN — METOPROLOL TARTRATE 50 MG: 50 TABLET ORAL at 08:12

## 2018-12-07 RX ADMIN — HEPARIN SODIUM 5000 UNITS: 5000 INJECTION, SOLUTION INTRAVENOUS; SUBCUTANEOUS at 06:12

## 2018-12-07 RX ADMIN — NIFEDIPINE 20 MG: 10 CAPSULE, LIQUID FILLED ORAL at 06:12

## 2018-12-07 RX ADMIN — METOPROLOL TARTRATE 50 MG: 50 TABLET ORAL at 09:12

## 2018-12-07 RX ADMIN — PANCRELIPASE 1 CAPSULE: 24000; 76000; 120000 CAPSULE, DELAYED RELEASE PELLETS ORAL at 12:12

## 2018-12-07 RX ADMIN — MAGNESIUM OXIDE TAB 400 MG (241.3 MG ELEMENTAL MG) 400 MG: 400 (241.3 MG) TAB at 08:12

## 2018-12-07 RX ADMIN — HEPARIN SODIUM 5000 UNITS: 5000 INJECTION, SOLUTION INTRAVENOUS; SUBCUTANEOUS at 09:12

## 2018-12-07 RX ADMIN — POTASSIUM PHOSPHATE, MONOBASIC 500 MG: 500 TABLET, SOLUBLE ORAL at 08:12

## 2018-12-07 RX ADMIN — GABAPENTIN 100 MG: 100 CAPSULE ORAL at 08:12

## 2018-12-07 RX ADMIN — SODIUM CHLORIDE: 0.9 INJECTION, SOLUTION INTRAVENOUS at 12:12

## 2018-12-07 RX ADMIN — TRAMADOL HYDROCHLORIDE 50 MG: 50 TABLET, COATED ORAL at 04:12

## 2018-12-07 RX ADMIN — BUPRENORPHINE HYDROCHLORIDE, NALOXONE HYDROCHLORIDE 1 EACH: 2; .5 FILM, SOLUBLE BUCCAL; SUBLINGUAL at 09:12

## 2018-12-07 RX ADMIN — TRAMADOL HYDROCHLORIDE 50 MG: 50 TABLET, COATED ORAL at 06:12

## 2018-12-07 RX ADMIN — PANCRELIPASE 1 CAPSULE: 24000; 76000; 120000 CAPSULE, DELAYED RELEASE PELLETS ORAL at 08:12

## 2018-12-07 RX ADMIN — CEFTRIAXONE 1 G: 1 INJECTION, SOLUTION INTRAVENOUS at 06:12

## 2018-12-07 RX ADMIN — POTASSIUM CHLORIDE 30 MEQ: 750 TABLET, EXTENDED RELEASE ORAL at 08:12

## 2018-12-07 NOTE — PLAN OF CARE
Problem: Patient Care Overview  Goal: Plan of Care Review  Outcome: Ongoing (interventions implemented as appropriate)  Patient aaox4, safety measures implemented, call light in reach, bed alarm set, patient pain controlled with ordered medication, patient up to the bathroom with assist no dizziness or weakness noted, will continue to monitor.

## 2018-12-07 NOTE — PLAN OF CARE
TN met with patient and son at bedside. Rounded with Dr Wilson. Plan of care reviewed. psyc consulted.  Patient plans to discharge to home, not behavioral health facility upon discharge day.  TN reviewed medications with patient and son.   Dr. Wilson provided patient with recommendations to continue treatment at [facility].  Patient continues to refuse. Plan for d/c to home in am.     This  put name on white board and explained blue discharge folder to patient. Discharge planning brochure and/or business card given to patient.  Patient verbalized understanding.       Future Appointments   Date Time Provider Department Center   12/19/2018  1:40 PM Magdalena Hernandez MD [facility] [place]          12/07/18 1524   Discharge Assessment   Assessment Type Discharge Planning Assessment   Confirmed/corrected address and phone number on facesheet? Yes   Assessment information obtained from? Patient   Communicated expected length of stay with patient/caregiver yes   Prior to hospitilization cognitive status: Alert/Oriented   Prior to hospitalization functional status: Independent   Current cognitive status: Alert/Oriented   Current Functional Status: Independent;Assistive Equipment   Lives With child(katherine), adult;grandchild(katherine)   Able to Return to Prior Arrangements yes   Is patient able to care for self after discharge? Unable to determine at this time (comments)   Who are your caregiver(s) and their phone number(s)? self care / son and daughter in law   Patient's perception of discharge disposition home or selfcare   Readmission Within The Last 30 Days no previous admission in last 30 days   Patient currently being followed by outpatient case management? No   Patient currently receives any other outside agency services? No   Is it the patient/care giver preference to resume care with the current outside agency? Yes   Equipment Currently Used at Home 3-in-1 commode;rollator   Do you have any problems  affording any of your prescribed medications? TBD   Is the patient taking medications as prescribed? yes   Does the patient have transportation home? Yes   Transportation Available family or friend will provide   Does the patient receive services at the [facility]? No   Discharge Plan A Home with family;Home Health   Discharge Plan B Psychiatric hospital   Patient/Family In Agreement With Plan yes

## 2018-12-07 NOTE — NURSING
Evening Rounds completed.  Patient denies any concerns, questions or needs at this time.  Will continue to monitor.

## 2018-12-07 NOTE — PLAN OF CARE
Problem: Patient Care Overview  Goal: Plan of Care Review  Patient is AAOx4. No complaints of pain. No nausea or vomiting. Patient family was at bedside. Patient requires 1 person assist when ambulating to bathroom. No loss of consciousness during shift.

## 2018-12-08 VITALS
SYSTOLIC BLOOD PRESSURE: 133 MMHG | DIASTOLIC BLOOD PRESSURE: 70 MMHG | OXYGEN SATURATION: 98 % | WEIGHT: 158.31 LBS | HEIGHT: 66 IN | BODY MASS INDEX: 25.44 KG/M2 | HEART RATE: 88 BPM | TEMPERATURE: 99 F | RESPIRATION RATE: 20 BRPM

## 2018-12-08 PROBLEM — N17.9 AKI (ACUTE KIDNEY INJURY): Status: RESOLVED | Noted: 2018-04-26 | Resolved: 2018-12-08

## 2018-12-08 LAB
ANION GAP SERPL CALC-SCNC: 7 MMOL/L
BACTERIA UR CULT: NORMAL
BASOPHILS # BLD AUTO: 0.01 K/UL
BASOPHILS NFR BLD: 0.1 %
BUN SERPL-MCNC: 17 MG/DL
CALCIUM SERPL-MCNC: 9.4 MG/DL
CHLORIDE SERPL-SCNC: 106 MMOL/L
CO2 SERPL-SCNC: 25 MMOL/L
CREAT SERPL-MCNC: 0.8 MG/DL
DIFFERENTIAL METHOD: ABNORMAL
EOSINOPHIL # BLD AUTO: 0 K/UL
EOSINOPHIL NFR BLD: 0.5 %
ERYTHROCYTE [DISTWIDTH] IN BLOOD BY AUTOMATED COUNT: 15.4 %
EST. GFR  (AFRICAN AMERICAN): >60 ML/MIN/1.73 M^2
EST. GFR  (NON AFRICAN AMERICAN): >60 ML/MIN/1.73 M^2
GLUCOSE SERPL-MCNC: 106 MG/DL
HCT VFR BLD AUTO: 26.2 %
HGB BLD-MCNC: 7.8 G/DL
LYMPHOCYTES # BLD AUTO: 1.3 K/UL
LYMPHOCYTES NFR BLD: 18 %
MAGNESIUM SERPL-MCNC: 1.5 MG/DL
MCH RBC QN AUTO: 26.4 PG
MCHC RBC AUTO-ENTMCNC: 29.8 G/DL
MCV RBC AUTO: 89 FL
MONOCYTES # BLD AUTO: 1 K/UL
MONOCYTES NFR BLD: 13.1 %
NEUTROPHILS # BLD AUTO: 5 K/UL
NEUTROPHILS NFR BLD: 67.5 %
PHOSPHATE SERPL-MCNC: 1.9 MG/DL
PLATELET # BLD AUTO: 315 K/UL
PMV BLD AUTO: 10.6 FL
POTASSIUM SERPL-SCNC: 4.5 MMOL/L
RBC # BLD AUTO: 2.96 M/UL
SODIUM SERPL-SCNC: 138 MMOL/L
WBC # BLD AUTO: 7.4 K/UL

## 2018-12-08 PROCEDURE — 85025 COMPLETE CBC W/AUTO DIFF WBC: CPT

## 2018-12-08 PROCEDURE — 94761 N-INVAS EAR/PLS OXIMETRY MLT: CPT

## 2018-12-08 PROCEDURE — 63600175 PHARM REV CODE 636 W HCPCS: Performed by: HOSPITALIST

## 2018-12-08 PROCEDURE — 84100 ASSAY OF PHOSPHORUS: CPT

## 2018-12-08 PROCEDURE — G0378 HOSPITAL OBSERVATION PER HR: HCPCS

## 2018-12-08 PROCEDURE — 25000003 PHARM REV CODE 250: Performed by: HOSPITALIST

## 2018-12-08 PROCEDURE — 83735 ASSAY OF MAGNESIUM: CPT

## 2018-12-08 PROCEDURE — 25000003 PHARM REV CODE 250: Performed by: SURGERY

## 2018-12-08 PROCEDURE — 80048 BASIC METABOLIC PNL TOTAL CA: CPT

## 2018-12-08 PROCEDURE — 25000003 PHARM REV CODE 250: Performed by: STUDENT IN AN ORGANIZED HEALTH CARE EDUCATION/TRAINING PROGRAM

## 2018-12-08 PROCEDURE — 36415 COLL VENOUS BLD VENIPUNCTURE: CPT

## 2018-12-08 RX ORDER — CIPROFLOXACIN 500 MG/1
500 TABLET ORAL EVERY 12 HOURS
Qty: 6 TABLET | Refills: 0 | Status: SHIPPED | OUTPATIENT
Start: 2018-12-08 | End: 2018-12-11

## 2018-12-08 RX ORDER — CIPROFLOXACIN 500 MG/1
500 TABLET ORAL EVERY 12 HOURS
Status: DISCONTINUED | OUTPATIENT
Start: 2018-12-08 | End: 2018-12-08 | Stop reason: HOSPADM

## 2018-12-08 RX ADMIN — HEPARIN SODIUM 5000 UNITS: 5000 INJECTION, SOLUTION INTRAVENOUS; SUBCUTANEOUS at 03:12

## 2018-12-08 RX ADMIN — PANCRELIPASE 1 CAPSULE: 24000; 76000; 120000 CAPSULE, DELAYED RELEASE PELLETS ORAL at 12:12

## 2018-12-08 RX ADMIN — NIFEDIPINE 20 MG: 10 CAPSULE, LIQUID FILLED ORAL at 03:12

## 2018-12-08 RX ADMIN — GABAPENTIN 100 MG: 100 CAPSULE ORAL at 08:12

## 2018-12-08 RX ADMIN — PANCRELIPASE 1 CAPSULE: 24000; 76000; 120000 CAPSULE, DELAYED RELEASE PELLETS ORAL at 08:12

## 2018-12-08 RX ADMIN — TRAMADOL HYDROCHLORIDE 50 MG: 50 TABLET, COATED ORAL at 12:12

## 2018-12-08 RX ADMIN — TRAMADOL HYDROCHLORIDE 50 MG: 50 TABLET, COATED ORAL at 08:12

## 2018-12-08 RX ADMIN — MAGNESIUM OXIDE TAB 400 MG (241.3 MG ELEMENTAL MG) 400 MG: 400 (241.3 MG) TAB at 08:12

## 2018-12-08 RX ADMIN — HEPARIN SODIUM 5000 UNITS: 5000 INJECTION, SOLUTION INTRAVENOUS; SUBCUTANEOUS at 06:12

## 2018-12-08 RX ADMIN — METOPROLOL TARTRATE 50 MG: 50 TABLET ORAL at 08:12

## 2018-12-08 RX ADMIN — NIFEDIPINE 20 MG: 10 CAPSULE, LIQUID FILLED ORAL at 06:12

## 2018-12-08 RX ADMIN — CEFTRIAXONE 1 G: 1 INJECTION, SOLUTION INTRAVENOUS at 06:12

## 2018-12-08 RX ADMIN — POTASSIUM PHOSPHATE, MONOBASIC 500 MG: 500 TABLET, SOLUBLE ORAL at 08:12

## 2018-12-08 RX ADMIN — FERROUS GLUCONATE 324 MG: 324 TABLET ORAL at 12:12

## 2018-12-08 NOTE — PROGRESS NOTES
Floor nurse informed VN that pt is refusing suboxone. VN notified MD. MD ordered to d/c suboxone. Floor nurse notified.

## 2018-12-08 NOTE — PLAN OF CARE
Problem: Patient Care Overview  Goal: Plan of Care Review  Outcome: Ongoing (interventions implemented as appropriate)  Patient aaox4, safety measures implemented, call light in reach, bed alarm set, patient up to the bathroom with assist, patient's pain controlled by ordered medication, patient not coping well with discharge instructions, will continue to monitor.

## 2018-12-08 NOTE — PLAN OF CARE
Discharge orders noted, no HH or HME ordered.    Future Appointments   Date Time Provider Department Center   12/19/2018  1:40 PM Magdalena Hernandez [facility]       Pt's nurse will go over medications/signs and symptoms prior to discharge       12/08/18 1717   Final Note   Assessment Type Final Discharge Note   Anticipated Discharge Disposition Home   What phone number can be called within the next 1-3 days to see how you are doing after discharge? [phone]   Hospital Follow Up  Appt(s) scheduled? Yes   Right Care Referral Info   Post Acute Recommendation No Care     Marcie Koehler, RN Transitional Navigator  [phone]

## 2018-12-09 NOTE — PROGRESS NOTES
Discharge teaching given via VN. Pt demonstrated understanding utilizing the teachback method. All questions answered. Floor nurse notified.

## 2018-12-10 NOTE — PSYCH
"IDENTIFICATION DATA:  This is a 68-year-old  female who was   brought to [facility] from [facility] due to syncope and   respiratory distress.  This consult is requested by Dr. Perry for opioid abuse   issue.  The patient is not on a PEC status.    CHIEF COMPLAINT:  "I feel better, but still have some pain."    HISTORY OF PRESENT ILLNESS:  This patient is known to me from previous consult   related to the patient's use of opioid preparations.  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  The patient's   friend reported that the patient was abusing her opioids.  She was doing home   opioid preparation during her last day on fifth floor.  The patient consumed 90   pills and attempted suicide on 11/27/2018 and she received  after that.  The   patient states that her pain was severe that is why she was using it.  The   patient was on oxycodone 30 in the past, which was making her sleepy and had   falls.  The patient was on Sertraline in the past.  The patient still has sad   look, but states that she has no depression.  She is tired.  She is guarded and   minimizing her depressive symptoms.  She still feels helpless and at times   worthless.  She has no suicidal thoughts.  The patient and patient's family   agreed to switch her opioids, Percocet and other opioids, oxycodone to Suboxone   for safety reason.  The patient was transferred to [facility] where she   stayed for few hours.  She was attentive but weak upon arrival.  She had UTI.    At nighttime she went to restroom and had respiratory distress and according to   EMS had no syncope.  The patient was brought to [facility] ER where she was   given IV fluids and initiated Rocephin.  The patient is now on medical floor.    The patient is still depressed, sad, anhedonic. No anxiety noted.  She denies   hallucinations, delusions, fear, or phobia.  She has no intention to harm self.    She " wanted to be discharged to home.    PAST PSYCHIATRIC HISTORY:  Unchanged since last consult on 12/03/2018.  The   patient has no history of suicide or homicide.  The patient has no significant   history of alcohol, but she is abusing pain medication.    SOCIAL AND FAMILY HISTORY:  The patient was born and raised in [place].  She   reported her childhood as good.  She was raised by parents.  She was a   .  The patient states that she has a 10th grade education.    She lives with her son who is very supportive.  She denies history of mental   illness, suicide, or homicide.    MEDICAL HISTORY:  The patient has a history of neuroendocrine tumor and   metastasis to liver.  She has nephrolithiasis and colon cancer.  She was on   Norco, which was discontinued at the time of last discharge from here.  She is   no more on Norco or OxyContin.  She had been on.     ALLERGIES:  SHE IS ALLERGIC TO EPINEPHRINE, CONTRAST MEDIA, CELLCEPT, IBUPROFEN.    MENTAL STATUS EXAMINATION:  This is a 68-year-old lethargic, apathic,   -American female who looks about her stated age.  She is alert,   cooperative and oriented to person, day, date, month and year.  She required few   attempts to correct herself.  The patient is lacking motivation and energy.    She is lacing attention due to her lethargy.  She is not reliable with the   cognitive tasks at this time.  She has no hallucinations, delusions or thoughts   of harm to self or others.  Insight and judgment are impaired.  She is average   intelligent person.    PSYCHIATRIC DIAGNOSES  1.  Major depressive disorder, recurrent without psychotic features, opioid use   disorders, severe in a controlled environment.  AXIS II:  No diagnosis.  AXIS III:  Neuroendocrine tumor of unknown strength with metastasis to liver,   colon cancer.  AXIS IV:  Medical problems, abuse of medications.  AXIS V:  35.    RECOMMENDATIONS:  We will initiate Suboxone 2:0.5 one sublingual  twice a x1 day   and then increase according to the pain situation and her tolerability.  We will   monitor hypertension, sedation, and  stress.  We will consider discharging   this patient to home from [facility] instead of sending the patient to [facility].  4.  We will restart Zoloft for her depression.  We will continue Rocephin and   other medications as prescribed for medical reasons.          /clifton [id number] blank(s)        AI/DOMINGO  dd: 12/07/2018 11:32:58 (CST)  td: 12/07/2018 23:50:05 (CST)  Doc ID   #[id number]  Job ID #[id number]    CC:

## 2018-12-10 NOTE — DISCHARGE SUMMARY
DATE OF ADMISSION:  12/06/2018    DATE OF DISCHARGE:  12/08/2018    ADMISSION DIAGNOSES:  1.  History of neuroendocrine tumor.  2.  History of narcotic abuse.  3.  Altered mental status.  4.  Profound volume depletion.  5.  Acute kidney injury.    DISCHARGE DIAGNOSIS:  1.  History of neuroendocrine tumor.  2.  History of narcotic abuse.  3.  Altered mental status.  4.  Profound volume depletion.  5.  Acute kidney injury.    PROCEDURES PERFORMED: None.    CONSULTS:  None.    INDICATIONS FOR HOSPITALIZATION:  Ms. Domingo is a 68-year-old female, known to   the Surgical Service.  She was recently sent to a drug rehab facility for   inpatient psych.  She underwent a catheter-directed chemoembolization last week.    Postoperatively from that, she was found to have very difficult pain control   and was ultimately found to be sneaking opiate pain medications from her purse   while in the hospital.  Her family expressed a concern for her opioid use and   concern for addiction.  After a long discussion with the family and the patient,   she agreed to voluntarily go to inpatient psych and opiate rehab facility.  She   was discharged to that facility.  Approximately 36 hours after discharge, a   nurse at the facility states they were running on the patient approximately two   in the morning, she was found to be in her bed unresponsive to a sternal rub and   have a weak pulse.  She was therefore transferred back to [facility].  Upon   evaluation in the Emergency Department, she was found to be somnolent and to   have an acute kidney injury with a creatinine of 3.  She was admitted to the   hospital for evaluation and treatment of this.    The patient was volume resuscitated aggressively and had resolution of her FLORECITA   back to a normal creatinine.  At this time, she was not getting any narcotic   pain medications while she was in the hospital.  She had been started on   Suboxone therapy at the rehab facility, it was felt  that this was the cause of   her altered mental status.  During her hospitalization, she was tolerating a   diet and did not have any episodes of taking pills that we were aware of.    We had a long discussion with the patient and her family.  Her family expresses   that they would like her to return to her rehab facility, but despite persistent   and recurrent encouragement with the patient that it is our medical opinion   that she would benefit from inpatient rehabilitation, she adamantly declines to   go to the rehab facility.  After further discussion with the patient and   attending physician, she is deemed to be able to go home.  She was discharged   home with no opiate pain medications.    We continued to encourage that the patient seek inpatient rehabilitation and   drug rehabilitation.  She will need to be completely drug free prior to any   surgical intervention for her neuroendocrine tumor.    DISCHARGE DISPOSITION:  Discharged home to the care of her family.    DISCHARGE CONDITION:  Good.    DISCHARGE DIET:  Ad rolando.    DISCHARGE ACTIVITY:  Ad rolando.    DISCHARGE WOUND CARE:  None needed.    DISCHARGE FOLLOWUP:  The patient is to follow up with Dr. Perez in   approximately 3 to 4 weeks.  We again encouraged her to seek inpatient   rehabilitation for her drug addiction.      KALYANI/DOMINGO  dd: 12/09/2018 22:01:49 (CST)  td: 12/10/2018 02:32:26 (CST)  Doc ID   #[id number]  Job ID #[id number]    CC:

## 2018-12-14 RX ORDER — LOSARTAN POTASSIUM 50 MG/1
TABLET ORAL
Qty: 30 TABLET | Refills: 2 | Status: SHIPPED | OUTPATIENT
Start: 2018-12-14 | End: 2018-12-19 | Stop reason: SDUPTHER

## 2018-12-19 ENCOUNTER — OFFICE VISIT (OUTPATIENT)
Dept: FAMILY MEDICINE | Facility: CLINIC | Age: 68
End: 2018-12-19
Payer: MEDICARE

## 2018-12-19 VITALS
DIASTOLIC BLOOD PRESSURE: 60 MMHG | HEIGHT: 66 IN | OXYGEN SATURATION: 100 % | HEART RATE: 75 BPM | WEIGHT: 165.13 LBS | BODY MASS INDEX: 26.54 KG/M2 | TEMPERATURE: 98 F | SYSTOLIC BLOOD PRESSURE: 124 MMHG

## 2018-12-19 DIAGNOSIS — D50.9 IRON DEFICIENCY ANEMIA, UNSPECIFIED IRON DEFICIENCY ANEMIA TYPE: ICD-10-CM

## 2018-12-19 DIAGNOSIS — D50.8 OTHER IRON DEFICIENCY ANEMIA: ICD-10-CM

## 2018-12-19 DIAGNOSIS — K21.9 GASTROESOPHAGEAL REFLUX DISEASE, ESOPHAGITIS PRESENCE NOT SPECIFIED: ICD-10-CM

## 2018-12-19 DIAGNOSIS — I1A.0 RESISTANT HYPERTENSION: Primary | Chronic | ICD-10-CM

## 2018-12-19 DIAGNOSIS — R19.7 DIARRHEA, UNSPECIFIED TYPE: ICD-10-CM

## 2018-12-19 DIAGNOSIS — R11.0 NAUSEA: ICD-10-CM

## 2018-12-19 PROCEDURE — 3074F SYST BP LT 130 MM HG: CPT | Mod: CPTII,S$GLB,, | Performed by: FAMILY MEDICINE

## 2018-12-19 PROCEDURE — 99214 OFFICE O/P EST MOD 30 MIN: CPT | Mod: S$GLB,,, | Performed by: FAMILY MEDICINE

## 2018-12-19 PROCEDURE — 3078F DIAST BP <80 MM HG: CPT | Mod: CPTII,S$GLB,, | Performed by: FAMILY MEDICINE

## 2018-12-19 PROCEDURE — 99999 PR PBB SHADOW E&M-EST. PATIENT-LVL III: CPT | Mod: PBBFAC,,, | Performed by: FAMILY MEDICINE

## 2018-12-19 PROCEDURE — 1101F PT FALLS ASSESS-DOCD LE1/YR: CPT | Mod: CPTII,S$GLB,, | Performed by: FAMILY MEDICINE

## 2018-12-19 RX ORDER — PROMETHAZINE HYDROCHLORIDE 25 MG/1
25 TABLET ORAL EVERY 6 HOURS PRN
COMMUNITY
End: 2018-12-19 | Stop reason: SDUPTHER

## 2018-12-19 NOTE — PROGRESS NOTES
HPI:  Patito Domingo is a 68 y.o. year old female that  presents for f/u. She has had a procedure to reduce the size of the tumor in her liver. She is in pain when she walks  That makes her want to get off of her feet and sit down.  Chief Complaint   Patient presents with    Follow-up   .           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)      Social History     Socioeconomic History    Marital status:      Spouse name: Not on file    Number of children: Not on file    Years of education: Not on file    Highest education level: Not on file   Social Needs    Financial resource strain: Not on file    Food insecurity - worry: Not on file    Food insecurity - inability: Not on file    Transportation needs - medical: Not on file    Transportation needs - non-medical: Not on file   Occupational History    Occupation: disabled    Tobacco Use    Smoking status: Never Smoker    Smokeless tobacco: Never Used   Substance and Sexual Activity    Alcohol use: No    Drug use: No    Sexual activity: Not Currently   Other Topics Concern    Not on file   Social History Narrative    Not on file     Past Surgical History:   Procedure Laterality Date    CATARACT EXTRACTION Left 10/2017    CHOLECYSTECTOMY      CHOLECYSTECTOMY N/A 12/30/2017    [facility]    COLON SURGERY      cystoscope      EYE SURGERY      HYSTERECTOMY  5/1996    INSERTION-INTRAOCULAR LENS (IOL) Left 10/4/2017    Performed by [facility] at [facility] OR    LITHOTRIPSY      LIVER BIOPSY  9/14    carcinoid    PHACOEMULSIFICATION-ASPIRATION-CATARACT Left 10/4/2017    Performed by [facility] at [facility] OR     Family History   Problem Relation Age of Onset     "Cancer Mother         unknown    Alzheimer's disease Father     Stroke Sister     No Known Problems Son     No Known Problems Son     No Known Problems Son     No Known Problems Son     Kidney disease Neg Hx            Review of Systems  General ROS: negative for chills, fever or weight loss  ENT ROS: negative for epistaxis, sore throat or rhinorrhea  Respiratory ROS: no cough, shortness of breath, or wheezing  Cardiovascular ROS: no chest pain or dyspnea on exertion  Gastrointestinal ROS: no abdominal pain, change in bowel habits, or black/ bloody stools    Physical Exam:  /60 (BP Location: Right arm, Patient Position: Sitting, BP Method: Small (Manual))   Pulse 75   Temp 98.4 °F (36.9 °C) (Oral)   Ht 5' 6" (1.676 m)   Wt 74.9 kg (165 lb 2 oz)   SpO2 100%   BMI 26.65 kg/m²   General appearance: alert, cooperative, no distress  Constitutional:Oriented to person, place, and time.appears well-developed and well-nourished.  HEENT: Normocephalic, atraumatic, neck symmetrical, no nasal discharge, TM- clear bilaterally  Lungs: clear to auscultation bilaterally, no dullness to percussion bilaterally  Heart: regular rate and rhythm without rub; no displacement of the PMI , S1&S2 present  Abdomen: soft, non-tender; bowel sounds normoactive; no organomegaly  Physical Exam      LABS:    Complete Blood Count  Lab Results   Component Value Date    RBC 2.96 (L) 12/08/2018    HGB 7.8 (L) 12/08/2018    HCT 26.2 (L) 12/08/2018    MCV 89 12/08/2018    MCH 26.4 (L) 12/08/2018    MCHC 29.8 (L) 12/08/2018    RDW 15.4 (H) 12/08/2018     12/08/2018    MPV 10.6 12/08/2018    GRAN 5.0 12/08/2018    GRAN 67.5 12/08/2018    LYMPH 1.3 12/08/2018    LYMPH 18.0 12/08/2018    MONO 1.0 12/08/2018    MONO 13.1 12/08/2018    EOS 0.0 12/08/2018    BASO 0.01 12/08/2018    EOSINOPHIL 0.5 12/08/2018    BASOPHIL 0.1 12/08/2018    DIFFMETHOD Automated 12/08/2018       Comprehensive Metabolic Panel  Lab Results   Component Value " Date     12/08/2018    BUN 17 12/08/2018    CREATININE 0.8 12/08/2018     12/08/2018    K 4.5 12/08/2018     12/08/2018    PROT 7.0 12/06/2018    ALBUMIN 2.3 (L) 12/06/2018    BILITOT 0.3 12/06/2018    AST 13 12/06/2018    ALKPHOS 80 12/06/2018    CO2 25 12/08/2018    ALT 10 12/06/2018    ANIONGAP 7 (L) 12/08/2018    EGFRNONAA >60 12/08/2018    ESTGFRAFRICA >60 12/08/2018       LIPID  Lab Results   Component Value Date    CHOL 100 (L) 04/28/2018    HDL 64 04/28/2018         TSH  Lab Results   Component Value Date    TSH 0.896 03/14/2016       Current Outpatient Medications   Medication Sig Dispense Refill    bumetanide (BUMEX) 0.5 MG Tab Take 1 tablet (0.5 mg total) by mouth once daily. (Patient taking differently: Take 0.5 mg by mouth daily as needed. ) 30 tablet 2    gabapentin (NEURONTIN) 100 MG capsule takes one capsule  capsule 6    lanreotide (SOMATULINE DEPOT) 120 mg/0.5 mL Syrg Inject 120 mg into the skin every 28 days.      lipase-protease-amylase 12,000-38,000-60,000 units (CREON) CpDR Take 1 capsule by mouth 3 (three) times daily with meals. 90 capsule 11    losartan (COZAAR) 50 MG tablet Take 1 tablet (50 mg total) by mouth once daily. 30 tablet 2    magnesium oxide (MAG-OX) 400 mg tablet Take 1 tablet (400 mg total) by mouth once daily. 30 tablet 2    metoprolol tartrate (LOPRESSOR) 50 MG tablet Take 1 tablet (50 mg total) by mouth 2 (two) times daily. 180 tablet 0    promethazine (PHENERGAN) 25 MG tablet Take 1 tablet (25 mg total) by mouth every 6 (six) hours as needed for Nausea. 30 tablet 2    walker (ULTRA-LIGHT ROLLATOR) Misc 1 Units by Misc.(Non-Drug; Combo Route) route once daily. 1 each 0    cimetidine (TAGAMET) 300 MG tablet Take 1 tablet (300 mg total) by mouth 4 (four) times daily. 120 tablet 2    diphenoxylate-atropine 2.5-0.025 mg (LOMOTIL) 2.5-0.025 mg per tablet Take 1 tablet by mouth 4 (four) times daily as needed for Diarrhea. 90 tablet 2     ferrous gluconate (FERGON) 324 MG tablet Take 1 tablet (324 mg total) by mouth daily with breakfast. 90 tablet 2     No current facility-administered medications for this visit.        Assessment:    ICD-10-CM ICD-9-CM    1. Resistant hypertension I10 401.9 losartan (COZAAR) 50 MG tablet   2. Nausea R11.0 787.02 promethazine (PHENERGAN) 25 MG tablet   3. Diarrhea, unspecified type R19.7 787.91 diphenoxylate-atropine 2.5-0.025 mg (LOMOTIL) 2.5-0.025 mg per tablet   4. Other iron deficiency anemia D50.8 280.8    5. Iron deficiency anemia, unspecified iron deficiency anemia type D50.9 280.9 ferrous gluconate (FERGON) 324 MG tablet   6. Gastroesophageal reflux disease, esophagitis presence not specified K21.9 530.81 cimetidine (TAGAMET) 300 MG tablet         Plan:    Follow-up in about 3 months (around 3/19/2019).          Magdalena Hernandez MD

## 2018-12-20 RX ORDER — CIMETIDINE 300 MG/1
300 TABLET, FILM COATED ORAL 4 TIMES DAILY
Qty: 120 TABLET | Refills: 2 | Status: SHIPPED | OUTPATIENT
Start: 2018-12-20 | End: 2019-05-22

## 2018-12-20 RX ORDER — DIPHENOXYLATE HYDROCHLORIDE AND ATROPINE SULFATE 2.5; .025 MG/1; MG/1
1 TABLET ORAL 4 TIMES DAILY PRN
Qty: 90 TABLET | Refills: 2 | Status: SHIPPED | OUTPATIENT
Start: 2018-12-20 | End: 2018-12-30

## 2018-12-20 RX ORDER — FERROUS GLUCONATE 324(38)MG
324 TABLET ORAL
Qty: 90 TABLET | Refills: 2 | Status: SHIPPED | OUTPATIENT
Start: 2018-12-20 | End: 2019-05-22

## 2018-12-20 RX ORDER — LOSARTAN POTASSIUM 50 MG/1
50 TABLET ORAL DAILY
Qty: 30 TABLET | Refills: 2 | Status: SHIPPED | OUTPATIENT
Start: 2018-12-20 | End: 2019-02-13 | Stop reason: SDUPTHER

## 2018-12-20 RX ORDER — PROMETHAZINE HYDROCHLORIDE 25 MG/1
25 TABLET ORAL EVERY 6 HOURS PRN
Qty: 30 TABLET | Refills: 2 | Status: SHIPPED | OUTPATIENT
Start: 2018-12-20 | End: 2019-05-22 | Stop reason: SDUPTHER

## 2018-12-21 ENCOUNTER — TELEPHONE (OUTPATIENT)
Dept: FAMILY MEDICINE | Facility: CLINIC | Age: 68
End: 2018-12-21

## 2018-12-21 NOTE — TELEPHONE ENCOUNTER
----- Message from Lilibeth Flowers sent at 12/21/2018 12:41 PM CST -----  [facility] is unable to get Tagament.   Please call in something else.   No. [phone]

## 2018-12-28 ENCOUNTER — TELEPHONE (OUTPATIENT)
Dept: NEUROLOGY | Facility: HOSPITAL | Age: 68
End: 2018-12-28

## 2018-12-28 NOTE — TELEPHONE ENCOUNTER
"Matias, son, requesting document stating "pt has history of narcotic abuse'.  Pt is going to an appt at [facility], son is afraid pt may be prescribed narcotics".  Matias instructed to have Julian Lainez request medical records, since pt has not authorized this clinic to release medical records.  Matias acknowledged understanding.  "

## 2018-12-31 ENCOUNTER — EXTERNAL CHRONIC CARE MANAGEMENT (OUTPATIENT)
Dept: PRIMARY CARE CLINIC | Facility: CLINIC | Age: 68
End: 2018-12-31
Payer: MEDICARE

## 2018-12-31 PROCEDURE — 99490 CHRNC CARE MGMT STAFF 1ST 20: CPT | Mod: S$GLB,,, | Performed by: FAMILY MEDICINE

## 2018-12-31 PROCEDURE — 99490 PR CHRONIC CARE MGMT, 1ST 20 MIN: ICD-10-PCS | Mod: S$GLB,,, | Performed by: FAMILY MEDICINE

## 2019-01-03 RX ORDER — HYDROGEN PEROXIDE 3 %
20 SOLUTION, NON-ORAL MISCELLANEOUS
Qty: 30 CAPSULE | Refills: 2 | Status: SHIPPED | OUTPATIENT
Start: 2019-01-03 | End: 2019-05-22

## 2019-01-07 ENCOUNTER — TELEPHONE (OUTPATIENT)
Dept: FAMILY MEDICINE | Facility: CLINIC | Age: 69
End: 2019-01-07

## 2019-01-07 RX ORDER — OXYCODONE HYDROCHLORIDE 15 MG/1
15 TABLET ORAL EVERY 4 HOURS PRN
Refills: 0 | Status: ON HOLD | COMMUNITY
Start: 2018-12-28 | End: 2021-07-09 | Stop reason: HOSPADM

## 2019-01-07 NOTE — TELEPHONE ENCOUNTER
----- Message from Maria Eugenia Davis sent at 1/7/2019  9:48 AM CST -----  Contact: [phone]  Patient would like refill of DIPHENOXYLATE ATROP 2.5 MG (60 TABLETS) sent to [facility]. Please call.

## 2019-01-14 RX ORDER — METOPROLOL TARTRATE 50 MG/1
50 TABLET ORAL 2 TIMES DAILY
Qty: 180 TABLET | Refills: 0 | Status: SHIPPED | OUTPATIENT
Start: 2019-01-14 | End: 2019-05-22 | Stop reason: SDUPTHER

## 2019-01-18 ENCOUNTER — TELEPHONE (OUTPATIENT)
Dept: FAMILY MEDICINE | Facility: CLINIC | Age: 69
End: 2019-01-18

## 2019-01-18 NOTE — TELEPHONE ENCOUNTER
----- Message from Madie Nelson sent at 1/18/2019  1:39 PM CST -----  Contact: self, [phone]  Patient called in returning your call. Please advise.

## 2019-01-18 NOTE — TELEPHONE ENCOUNTER
----- Message from Indigo Mariscal sent at 1/18/2019  9:30 AM CST -----  Contact: [phone]/self  Patient called in requesting to speak with you. Patient prefers to speak with a nurse. Please advise.

## 2019-01-21 ENCOUNTER — TELEPHONE (OUTPATIENT)
Dept: FAMILY MEDICINE | Facility: CLINIC | Age: 69
End: 2019-01-21

## 2019-01-21 NOTE — TELEPHONE ENCOUNTER
----- Message from Albertina Wallis sent at 1/21/2019 12:27 PM CST -----  Contact: self / [phone]  Patient is requesting a call back regarding, the fluid in her body . Please advise

## 2019-01-21 NOTE — TELEPHONE ENCOUNTER
Called patient she still is having leg and hand swelling would like to take more of the fluid pill/

## 2019-01-31 ENCOUNTER — EXTERNAL CHRONIC CARE MANAGEMENT (OUTPATIENT)
Dept: PRIMARY CARE CLINIC | Facility: CLINIC | Age: 69
End: 2019-01-31
Payer: MEDICARE

## 2019-01-31 PROCEDURE — 99490 CHRNC CARE MGMT STAFF 1ST 20: CPT | Mod: S$GLB,,, | Performed by: FAMILY MEDICINE

## 2019-01-31 PROCEDURE — 99490 PR CHRONIC CARE MGMT, 1ST 20 MIN: ICD-10-PCS | Mod: S$GLB,,, | Performed by: FAMILY MEDICINE

## 2019-02-01 ENCOUNTER — OFFICE VISIT (OUTPATIENT)
Dept: FAMILY MEDICINE | Facility: CLINIC | Age: 69
End: 2019-02-01
Payer: MEDICARE

## 2019-02-01 VITALS
HEART RATE: 66 BPM | HEIGHT: 66 IN | SYSTOLIC BLOOD PRESSURE: 112 MMHG | TEMPERATURE: 99 F | RESPIRATION RATE: 22 BRPM | OXYGEN SATURATION: 99 % | DIASTOLIC BLOOD PRESSURE: 62 MMHG | WEIGHT: 180 LBS | BODY MASS INDEX: 28.93 KG/M2

## 2019-02-01 DIAGNOSIS — M25.50 ARTHRALGIA, UNSPECIFIED JOINT: Primary | ICD-10-CM

## 2019-02-01 DIAGNOSIS — R30.0 DYSURIA: ICD-10-CM

## 2019-02-01 LAB
BILIRUB SERPL-MCNC: ABNORMAL MG/DL
BLOOD, POC UA: ABNORMAL
GLUCOSE UR QL STRIP: ABNORMAL
KETONES UR QL STRIP: ABNORMAL
LEUKOCYTE ESTERASE URINE, POC: ABNORMAL
NITRITE, POC UA: ABNORMAL
PH, POC UA: 5
PROTEIN, POC: ABNORMAL
SPECIFIC GRAVITY, POC UA: 1.01
UROBILINOGEN, POC UA: NORMAL

## 2019-02-01 PROCEDURE — 87077 CULTURE AEROBIC IDENTIFY: CPT

## 2019-02-01 PROCEDURE — 99214 PR OFFICE/OUTPT VISIT, EST, LEVL IV, 30-39 MIN: ICD-10-PCS | Mod: 25,S$GLB,, | Performed by: FAMILY MEDICINE

## 2019-02-01 PROCEDURE — 87186 SC STD MICRODIL/AGAR DIL: CPT

## 2019-02-01 PROCEDURE — 1101F PR PT FALLS ASSESS DOC 0-1 FALLS W/OUT INJ PAST YR: ICD-10-PCS | Mod: CPTII,S$GLB,, | Performed by: FAMILY MEDICINE

## 2019-02-01 PROCEDURE — 3074F SYST BP LT 130 MM HG: CPT | Mod: CPTII,S$GLB,, | Performed by: FAMILY MEDICINE

## 2019-02-01 PROCEDURE — 3078F PR MOST RECENT DIASTOLIC BLOOD PRESSURE < 80 MM HG: ICD-10-PCS | Mod: CPTII,S$GLB,, | Performed by: FAMILY MEDICINE

## 2019-02-01 PROCEDURE — 3074F PR MOST RECENT SYSTOLIC BLOOD PRESSURE < 130 MM HG: ICD-10-PCS | Mod: CPTII,S$GLB,, | Performed by: FAMILY MEDICINE

## 2019-02-01 PROCEDURE — 87086 URINE CULTURE/COLONY COUNT: CPT

## 2019-02-01 PROCEDURE — 81000 URINALYSIS NONAUTO W/SCOPE: CPT | Mod: S$GLB,,, | Performed by: FAMILY MEDICINE

## 2019-02-01 PROCEDURE — 81000 POCT URINALYSIS: ICD-10-PCS | Mod: S$GLB,,, | Performed by: FAMILY MEDICINE

## 2019-02-01 PROCEDURE — 87088 URINE BACTERIA CULTURE: CPT

## 2019-02-01 PROCEDURE — 99999 PR PBB SHADOW E&M-EST. PATIENT-LVL III: CPT | Mod: PBBFAC,,, | Performed by: FAMILY MEDICINE

## 2019-02-01 PROCEDURE — 99999 PR PBB SHADOW E&M-EST. PATIENT-LVL III: ICD-10-PCS | Mod: PBBFAC,,, | Performed by: FAMILY MEDICINE

## 2019-02-01 PROCEDURE — 3078F DIAST BP <80 MM HG: CPT | Mod: CPTII,S$GLB,, | Performed by: FAMILY MEDICINE

## 2019-02-01 PROCEDURE — 1101F PT FALLS ASSESS-DOCD LE1/YR: CPT | Mod: CPTII,S$GLB,, | Performed by: FAMILY MEDICINE

## 2019-02-01 PROCEDURE — 99214 OFFICE O/P EST MOD 30 MIN: CPT | Mod: 25,S$GLB,, | Performed by: FAMILY MEDICINE

## 2019-02-01 RX ORDER — DIPHENOXYLATE HYDROCHLORIDE AND ATROPINE SULFATE 2.5; .025 MG/1; MG/1
TABLET ORAL
COMMUNITY
Start: 2019-01-22 | End: 2019-04-17 | Stop reason: SDUPTHER

## 2019-02-01 RX ORDER — CIPROFLOXACIN 500 MG/1
500 TABLET ORAL 2 TIMES DAILY
Qty: 20 TABLET | Refills: 0 | Status: SHIPPED | OUTPATIENT
Start: 2019-02-01 | End: 2019-02-11

## 2019-02-01 RX ORDER — LIDOCAINE 50 MG/G
1 PATCH TOPICAL DAILY
Qty: 30 PATCH | Refills: 2 | Status: SHIPPED | OUTPATIENT
Start: 2019-02-01 | End: 2019-05-22

## 2019-02-01 NOTE — PROGRESS NOTES
HPI:  Patito Domingo is a 68 y.o. year old female that  presents with complaint of joint pain. She is taking the oxycodone and has been using topical OTC rubs that are not helping.  She admits to using turkey sandwich meat and TV dinners .She is swelling a lot .   Chief Complaint   Patient presents with    Edema     in feet pt states her feet burn    Arthritis     both arms and legs--pt states hurts    Urinary Tract Infection   .           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)      Social History     Socioeconomic History    Marital status:      Spouse name: Not on file    Number of children: Not on file    Years of education: Not on file    Highest education level: Not on file   Social Needs    Financial resource strain: Not on file    Food insecurity - worry: Not on file    Food insecurity - inability: Not on file    Transportation needs - medical: Not on file    Transportation needs - non-medical: Not on file   Occupational History    Occupation: disabled    Tobacco Use    Smoking status: Never Smoker    Smokeless tobacco: Never Used   Substance and Sexual Activity    Alcohol use: No    Drug use: No    Sexual activity: Not Currently   Other Topics Concern    Not on file   Social History Narrative    Not on file     Past Surgical History:   Procedure Laterality Date    CATARACT EXTRACTION Left 10/2017    CHOLECYSTECTOMY      CHOLECYSTECTOMY N/A 12/30/2017    [facility]    COLON SURGERY      cystoscope      EYE SURGERY      HYSTERECTOMY  5/1996    INSERTION-INTRAOCULAR LENS (IOL) Left 10/4/2017    [facility]    LITHOTRIPSY      LIVER BIOPSY  9/14    carcinoid     "PHACOEMULSIFICATION-ASPIRATION-CATARACT Left 10/4/2017    [facility]     Family History   Problem Relation Age of Onset    Cancer Mother         unknown    Alzheimer's disease Father     Stroke Sister     No Known Problems Son     No Known Problems Son     No Known Problems Son     No Known Problems Son     Kidney disease Neg Hx            Review of Systems  General ROS: negative for chills, fever or weight loss  ENT ROS: negative for epistaxis, sore throat or rhinorrhea  Respiratory ROS: no cough, shortness of breath, or wheezing  Cardiovascular ROS: no chest pain or dyspnea on exertion  Gastrointestinal ROS: no abdominal pain, change in bowel habits, or black/ bloody stools  Musculoskeletal: Joint pain  Physical Exam:  /62   Pulse 66   Temp 98.7 °F (37.1 °C) (Oral)   Resp (!) 22   Ht 5' 6" (1.676 m)   Wt 81.6 kg (180 lb)   SpO2 99%   BMI 29.05 kg/m²   General appearance: alert, cooperative, no distress  Constitutional:Oriented to person, place, and time.appears well-developed and well-nourished.  HEENT: Normocephalic, atraumatic, neck symmetrical, no nasal discharge, TM- clear bilaterally  Lungs: clear to auscultation bilaterally, no dullness to percussion bilaterally  Heart: regular rate and rhythm without rub; no displacement of the PMI , S1&S2 present  Abdomen: soft, non-tender; bowel sounds normoactive; no organomegaly  Physical Exam      LABS:    Complete Blood Count  Lab Results   Component Value Date    RBC 2.96 (L) 12/08/2018    HGB 7.8 (L) 12/08/2018    HCT 26.2 (L) 12/08/2018    MCV 89 12/08/2018    MCH 26.4 (L) 12/08/2018    MCHC 29.8 (L) 12/08/2018    RDW 15.4 (H) 12/08/2018     12/08/2018    MPV 10.6 12/08/2018    GRAN 5.0 12/08/2018    GRAN 67.5 12/08/2018    LYMPH 1.3 12/08/2018    LYMPH 18.0 12/08/2018    MONO 1.0 12/08/2018    MONO 13.1 12/08/2018    EOS 0.0 12/08/2018    BASO 0.01 12/08/2018    EOSINOPHIL 0.5 12/08/2018    BASOPHIL 0.1 " 12/08/2018    DIFFMETHOD Automated 12/08/2018       Comprehensive Metabolic Panel  Lab Results   Component Value Date     12/08/2018    BUN 17 12/08/2018    CREATININE 0.8 12/08/2018     12/08/2018    K 4.5 12/08/2018     12/08/2018    PROT 7.0 12/06/2018    ALBUMIN 2.3 (L) 12/06/2018    BILITOT 0.3 12/06/2018    AST 13 12/06/2018    ALKPHOS 80 12/06/2018    CO2 25 12/08/2018    ALT 10 12/06/2018    ANIONGAP 7 (L) 12/08/2018    EGFRNONAA >60 12/08/2018    ESTGFRAFRICA >60 12/08/2018       LIPID  Lab Results   Component Value Date    CHOL 100 (L) 04/28/2018    HDL 64 04/28/2018         TSH  Lab Results   Component Value Date    TSH 0.896 03/14/2016       Current Outpatient Medications   Medication Sig Dispense Refill    bumetanide (BUMEX) 0.5 MG Tab Take 1 tablet (0.5 mg total) by mouth once daily. (Patient taking differently: Take 0.5 mg by mouth daily as needed. ) 30 tablet 2    cimetidine (TAGAMET) 300 MG tablet Take 1 tablet (300 mg total) by mouth 4 (four) times daily. 120 tablet 2    diphenoxylate-atropine 2.5-0.025 mg (LOMOTIL) 2.5-0.025 mg per tablet       esomeprazole (NEXIUM) 20 MG capsule Take 1 capsule (20 mg total) by mouth before breakfast. 30 capsule 2    ferrous gluconate (FERGON) 324 MG tablet Take 1 tablet (324 mg total) by mouth daily with breakfast. 90 tablet 2    gabapentin (NEURONTIN) 100 MG capsule takes one capsule  capsule 6    lanreotide (SOMATULINE DEPOT) 120 mg/0.5 mL Syrg Inject 120 mg into the skin every 28 days.      lipase-protease-amylase 12,000-38,000-60,000 units (CREON) CpDR Take 1 capsule by mouth 3 (three) times daily with meals. 90 capsule 11    losartan (COZAAR) 50 MG tablet Take 1 tablet (50 mg total) by mouth once daily. 30 tablet 2    magnesium oxide (MAG-OX) 400 mg tablet Take 1 tablet (400 mg total) by mouth once daily. 30 tablet 2    metoprolol tartrate (LOPRESSOR) 50 MG tablet Take 1 tablet (50 mg total) by mouth 2 (two) times daily.  180 tablet 0    metoprolol tartrate (LOPRESSOR) 50 MG tablet TAKE 1 TABLET (50 MG TOTAL) BY MOUTH 2 (TWO) TIMES DAILY. 180 tablet 0    oxyCODONE (ROXICODONE) 15 MG Tab TK 1 T PO  Q 4 H  0    promethazine (PHENERGAN) 25 MG tablet Take 1 tablet (25 mg total) by mouth every 6 (six) hours as needed for Nausea. 30 tablet 2    ciprofloxacin HCl (CIPRO) 500 MG tablet Take 1 tablet (500 mg total) by mouth 2 (two) times daily. for 10 days 20 tablet 0    lidocaine (LIDODERM) 5 % Place 1 patch onto the skin once daily. Remove & Discard patch within 12 hours or as directed by MD 30 patch 2     No current facility-administered medications for this visit.        Assessment:    ICD-10-CM ICD-9-CM    1. Arthralgia, unspecified joint M25.50 719.40 lidocaine (LIDODERM) 5 %   2. Dysuria R30.0 788.1 ciprofloxacin HCl (CIPRO) 500 MG tablet      POCT Urinalysis         Plan:  UA:  Positive leukocytes, negative nitrites, positive blood  Told patient to discuss pain symptoms with her pain management doctor .  Have added lidocaine patches to her regimen  Patient instructed to stop eating processed preop air TB dinners and sandwich meet should be low-sodium to help reduce extremity swelling.  Follow-up in 3 months (on 5/1/2019).          Magdalena Hernandez MD

## 2019-02-04 ENCOUNTER — TELEPHONE (OUTPATIENT)
Dept: FAMILY MEDICINE | Facility: CLINIC | Age: 69
End: 2019-02-04

## 2019-02-04 LAB
BACTERIA UR CULT: NORMAL
BACTERIA UR CULT: NORMAL

## 2019-02-04 NOTE — TELEPHONE ENCOUNTER
----- Message from Stephanie Diehl sent at 2/4/2019  2:19 PM CST -----  Contact: [phone]/self   Patient requesting to speak with you concerning her medication  Please call and advise

## 2019-02-05 ENCOUNTER — TELEPHONE (OUTPATIENT)
Dept: FAMILY MEDICINE | Facility: CLINIC | Age: 69
End: 2019-02-05

## 2019-02-05 NOTE — TELEPHONE ENCOUNTER
----- Message from Indigo Mariscal sent at 2/5/2019 10:06 AM CST -----  Contact: [phone]/self  Patient called in returning your call. Please advise.

## 2019-02-07 ENCOUNTER — TELEPHONE (OUTPATIENT)
Dept: FAMILY MEDICINE | Facility: CLINIC | Age: 69
End: 2019-02-07

## 2019-02-07 NOTE — TELEPHONE ENCOUNTER
----- Message from Maria Eugenia Davis sent at 2/7/2019 11:55 AM CST -----  Contact: [phone]  Patient called in requesting to speak with you. Patient prefers to speak with a nurse. Please call.

## 2019-02-08 ENCOUNTER — TELEPHONE (OUTPATIENT)
Dept: FAMILY MEDICINE | Facility: CLINIC | Age: 69
End: 2019-02-08

## 2019-02-08 NOTE — TELEPHONE ENCOUNTER
----- Message from Indigo Mariscal sent at 2/8/2019  1:34 PM CST -----  Contact: [phone]/self  Patient would like to be seen sooner than the next available appointment. Please advise.

## 2019-02-11 ENCOUNTER — PES CALL (OUTPATIENT)
Dept: ADMINISTRATIVE | Facility: CLINIC | Age: 69
End: 2019-02-11

## 2019-02-13 ENCOUNTER — OFFICE VISIT (OUTPATIENT)
Dept: FAMILY MEDICINE | Facility: CLINIC | Age: 69
End: 2019-02-13
Payer: MEDICARE

## 2019-02-13 VITALS
TEMPERATURE: 98 F | BODY MASS INDEX: 29.09 KG/M2 | HEIGHT: 66 IN | DIASTOLIC BLOOD PRESSURE: 60 MMHG | WEIGHT: 181 LBS | RESPIRATION RATE: 18 BRPM | HEART RATE: 66 BPM | SYSTOLIC BLOOD PRESSURE: 118 MMHG | OXYGEN SATURATION: 99 %

## 2019-02-13 DIAGNOSIS — E83.42 HYPOMAGNESEMIA: ICD-10-CM

## 2019-02-13 DIAGNOSIS — C78.7 SECONDARY MALIGNANT NEOPLASM OF LIVER: ICD-10-CM

## 2019-02-13 DIAGNOSIS — I1A.0 RESISTANT HYPERTENSION: Chronic | ICD-10-CM

## 2019-02-13 DIAGNOSIS — N76.0 ACUTE VAGINITIS: ICD-10-CM

## 2019-02-13 DIAGNOSIS — R52 PAIN: Primary | ICD-10-CM

## 2019-02-13 PROCEDURE — 3074F PR MOST RECENT SYSTOLIC BLOOD PRESSURE < 130 MM HG: ICD-10-PCS | Mod: CPTII,S$GLB,, | Performed by: FAMILY MEDICINE

## 2019-02-13 PROCEDURE — 3074F SYST BP LT 130 MM HG: CPT | Mod: CPTII,S$GLB,, | Performed by: FAMILY MEDICINE

## 2019-02-13 PROCEDURE — 3078F DIAST BP <80 MM HG: CPT | Mod: CPTII,S$GLB,, | Performed by: FAMILY MEDICINE

## 2019-02-13 PROCEDURE — 99999 PR PBB SHADOW E&M-EST. PATIENT-LVL III: ICD-10-PCS | Mod: PBBFAC,,, | Performed by: FAMILY MEDICINE

## 2019-02-13 PROCEDURE — 3078F PR MOST RECENT DIASTOLIC BLOOD PRESSURE < 80 MM HG: ICD-10-PCS | Mod: CPTII,S$GLB,, | Performed by: FAMILY MEDICINE

## 2019-02-13 PROCEDURE — 99214 PR OFFICE/OUTPT VISIT, EST, LEVL IV, 30-39 MIN: ICD-10-PCS | Mod: S$GLB,,, | Performed by: FAMILY MEDICINE

## 2019-02-13 PROCEDURE — 1101F PR PT FALLS ASSESS DOC 0-1 FALLS W/OUT INJ PAST YR: ICD-10-PCS | Mod: CPTII,S$GLB,, | Performed by: FAMILY MEDICINE

## 2019-02-13 PROCEDURE — 99214 OFFICE O/P EST MOD 30 MIN: CPT | Mod: S$GLB,,, | Performed by: FAMILY MEDICINE

## 2019-02-13 PROCEDURE — 1101F PT FALLS ASSESS-DOCD LE1/YR: CPT | Mod: CPTII,S$GLB,, | Performed by: FAMILY MEDICINE

## 2019-02-13 PROCEDURE — 99999 PR PBB SHADOW E&M-EST. PATIENT-LVL III: CPT | Mod: PBBFAC,,, | Performed by: FAMILY MEDICINE

## 2019-02-13 RX ORDER — LANOLIN ALCOHOL/MO/W.PET/CERES
400 CREAM (GRAM) TOPICAL DAILY
Qty: 30 TABLET | Refills: 2 | Status: ON HOLD | OUTPATIENT
Start: 2019-02-13 | End: 2020-01-14

## 2019-02-13 RX ORDER — LOSARTAN POTASSIUM 50 MG/1
50 TABLET ORAL DAILY
Qty: 30 TABLET | Refills: 2 | Status: SHIPPED | OUTPATIENT
Start: 2019-02-13 | End: 2019-11-04 | Stop reason: SDUPTHER

## 2019-02-13 RX ORDER — FLUCONAZOLE 150 MG/1
150 TABLET ORAL DAILY
Qty: 1 TABLET | Refills: 0 | Status: SHIPPED | OUTPATIENT
Start: 2019-02-13 | End: 2019-02-14

## 2019-02-13 NOTE — PROGRESS NOTES
HPI:  Patito Domingo is a 68 y.o. year old female that  presents with continued concerned about intermittent swelling and right shoulder discomfort.  Patient has not seen her pain medicine doctor since our last visit and has not made contact with them to discuss her pain medications.  She states that she is taking the Bumex and has tried to decrease the amount of salty foods that she is in taking.  She would like for something that she could rub on to her skin for pain. Her lidocaine patches were denied by the insurance company.  Chief Complaint   Patient presents with    Edema     Bumex not working to take off fluid, pt states she only uses the restroom a lot    Vaginitis   .           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)      Social History     Socioeconomic History    Marital status:      Spouse name: Not on file    Number of children: Not on file    Years of education: Not on file    Highest education level: Not on file   Social Needs    Financial resource strain: Not on file    Food insecurity - worry: Not on file    Food insecurity - inability: Not on file    Transportation needs - medical: Not on file    Transportation needs - non-medical: Not on file   Occupational History    Occupation: disabled    Tobacco Use    Smoking status: Never Smoker    Smokeless tobacco: Never Used   Substance and Sexual Activity    Alcohol use: No    Drug use: No    Sexual activity: Not Currently   Other Topics Concern    Not on file   Social History Narrative    Not on file     Past Surgical History:   Procedure Laterality Date    CATARACT EXTRACTION Left 10/2017    CHOLECYSTECTOMY      CHOLECYSTECTOMY N/A 12/30/2017    Performed by Chris Herbert [facility]  "at [facility] OR    COLON SURGERY      cystoscope      EYE SURGERY      HYSTERECTOMY  5/1996    INSERTION-INTRAOCULAR LENS (IOL) Left 10/4/2017    [facility] OR    LITHOTRIPSY      LIVER BIOPSY  9/14    carcinoid    PHACOEMULSIFICATION-ASPIRATION-CATARACT Left 10/4/2017    [facility] at [facility] OR     Family History   Problem Relation Age of Onset    Cancer Mother         unknown    Alzheimer's disease Father     Stroke Sister     No Known Problems Son     No Known Problems Son     No Known Problems Son     No Known Problems Son     Kidney disease Neg Hx            Review of Systems  General ROS: negative for chills, fever or weight loss  ENT ROS: negative for epistaxis, sore throat or rhinorrhea  Respiratory ROS: no cough, shortness of breath, or wheezing  Cardiovascular ROS: no chest pain or dyspnea on exertion  Gastrointestinal ROS: no abdominal pain, change in bowel habits, or black/ bloody stools    Physical Exam:  /60   Pulse 66   Temp 98.1 °F (36.7 °C) (Oral)   Resp 18   Ht 5' 6" (1.676 m)   Wt 82.1 kg (181 lb)   SpO2 99%   BMI 29.21 kg/m²   General appearance: alert, cooperative, no distress  Constitutional:Oriented to person, place, and time.appears well-developed and well-nourished.  HEENT: Normocephalic, atraumatic, neck symmetrical, no nasal discharge, TM- clear bilaterally  Lungs: clear to auscultation bilaterally, no dullness to percussion bilaterally  Heart: regular rate and rhythm without rub; no displacement of the PMI , S1&S2 present  Abdomen: soft, non-tender; bowel sounds normoactive; no organomegaly  Physical Exam      LABS:    Complete Blood Count  Lab Results   Component Value Date    RBC 2.96 (L) 12/08/2018    HGB 7.8 (L) 12/08/2018    HCT 26.2 (L) 12/08/2018    MCV 89 12/08/2018    MCH 26.4 (L) 12/08/2018    MCHC 29.8 (L) 12/08/2018    RDW 15.4 (H) 12/08/2018     12/08/2018    MPV 10.6 12/08/2018    GRAN 5.0 12/08/2018 "    GRAN 67.5 12/08/2018    LYMPH 1.3 12/08/2018    LYMPH 18.0 12/08/2018    MONO 1.0 12/08/2018    MONO 13.1 12/08/2018    EOS 0.0 12/08/2018    BASO 0.01 12/08/2018    EOSINOPHIL 0.5 12/08/2018    BASOPHIL 0.1 12/08/2018    DIFFMETHOD Automated 12/08/2018       Comprehensive Metabolic Panel  Lab Results   Component Value Date     12/08/2018    BUN 17 12/08/2018    CREATININE 0.8 12/08/2018     12/08/2018    K 4.5 12/08/2018     12/08/2018    PROT 7.0 12/06/2018    ALBUMIN 2.3 (L) 12/06/2018    BILITOT 0.3 12/06/2018    AST 13 12/06/2018    ALKPHOS 80 12/06/2018    CO2 25 12/08/2018    ALT 10 12/06/2018    ANIONGAP 7 (L) 12/08/2018    EGFRNONAA >60 12/08/2018    ESTGFRAFRICA >60 12/08/2018       LIPID  Lab Results   Component Value Date    CHOL 100 (L) 04/28/2018    HDL 64 04/28/2018         TSH  Lab Results   Component Value Date    TSH 0.896 03/14/2016       Current Outpatient Medications   Medication Sig Dispense Refill    bumetanide (BUMEX) 0.5 MG Tab Take 1 tablet (0.5 mg total) by mouth once daily. (Patient taking differently: Take 0.5 mg by mouth daily as needed. ) 30 tablet 2    cimetidine (TAGAMET) 300 MG tablet Take 1 tablet (300 mg total) by mouth 4 (four) times daily. 120 tablet 2    diphenoxylate-atropine 2.5-0.025 mg (LOMOTIL) 2.5-0.025 mg per tablet       esomeprazole (NEXIUM) 20 MG capsule Take 1 capsule (20 mg total) by mouth before breakfast. 30 capsule 2    lanreotide (SOMATULINE DEPOT) 120 mg/0.5 mL Syrg Inject 120 mg into the skin every 28 days.      lipase-protease-amylase 12,000-38,000-60,000 units (CREON) CpDR Take 1 capsule by mouth 3 (three) times daily with meals. 90 capsule 11    losartan (COZAAR) 50 MG tablet Take 1 tablet (50 mg total) by mouth once daily. 30 tablet 2    magnesium oxide (MAG-OX) 400 mg (241.3 mg magnesium) tablet Take 1 tablet (400 mg total) by mouth once daily. 30 tablet 2    metoprolol tartrate (LOPRESSOR) 50 MG tablet TAKE 1 TABLET (50 MG  TOTAL) BY MOUTH 2 (TWO) TIMES DAILY. 180 tablet 0    oxyCODONE (ROXICODONE) 15 MG Tab TK 1 T PO  Q 4 H  0    promethazine (PHENERGAN) 25 MG tablet Take 1 tablet (25 mg total) by mouth every 6 (six) hours as needed for Nausea. 30 tablet 2    CMPD Ketamine 10%- Baclofen 2%- Cyclobenzaprine 2%- Gabapentin 2%- lidocaine 5% in lipoderm base Apply 1 Pump (1.6 g total) topically once daily. 80 g 2    ferrous gluconate (FERGON) 324 MG tablet Take 1 tablet (324 mg total) by mouth daily with breakfast. 90 tablet 2    fluconazole (DIFLUCAN) 150 MG Tab Take 1 tablet (150 mg total) by mouth once daily. for 1 day 1 tablet 0    gabapentin (NEURONTIN) 100 MG capsule takes one capsule  capsule 6    lidocaine (LIDODERM) 5 % Place 1 patch onto the skin once daily. Remove & Discard patch within 12 hours or as directed by MD 30 patch 2     No current facility-administered medications for this visit.        Assessment:    ICD-10-CM ICD-9-CM    1. Pain R52 780.96 CMPD Ketamine 10%- Baclofen 2%- Cyclobenzaprine 2%- Gabapentin 2%- lidocaine 5% in lipoderm base   2. Resistant hypertension I10 401.9 losartan (COZAAR) 50 MG tablet   3. Secondary malignant neoplasm of liver C78.7 197.7    4. Hypomagnesemia E83.42 275.2 magnesium oxide (MAG-OX) 400 mg (241.3 mg magnesium) tablet   5. Acute vaginitis N76.0 616.10 fluconazole (DIFLUCAN) 150 MG Tab         Plan:  Encourage patient to keep appointment with pain management to further discuss other options for her pain. Will fill fluconazole prescription to help with occasional yeast infections.  Follow-up in 3 months (on 5/13/2019).          Magdalena Hernandez MD

## 2019-02-25 ENCOUNTER — TELEPHONE (OUTPATIENT)
Dept: FAMILY MEDICINE | Facility: CLINIC | Age: 69
End: 2019-02-25

## 2019-02-25 NOTE — TELEPHONE ENCOUNTER
----- Message from Maria Eugenia Davis sent at 2/25/2019  1:10 PM CST -----  Contact: [phone]  Patient was advised by [facility] they did not receive the Rx request for the CMPD Ketamine 10%- Baclofen 2%- Cyclobenzaprine 2%- Gabapentin 2%- lidocaine 5% in lipoderm base. Please call.

## 2019-02-25 NOTE — TELEPHONE ENCOUNTER
----- Message from Mike Varela sent at 2/25/2019  1:10 PM CST -----  Contact: [facility]/[phone]  Sig - Route: Place 1 patch onto the skin once daily. Remove & Discard patch within 12 hours or as directed by MD - Transdermal  Sent to pharmacy as: lidocaine (LIDODERM) 5 %.    They need a diagnosis code on this prescriptions sent on the 2/1/19.

## 2019-02-26 ENCOUNTER — TELEPHONE (OUTPATIENT)
Dept: FAMILY MEDICINE | Facility: CLINIC | Age: 69
End: 2019-02-26

## 2019-02-26 NOTE — TELEPHONE ENCOUNTER
----- Message from Lilibeth Flowers sent at 2/26/2019 12:20 PM CST -----  No. [phone]    [facility] does not have authorization for the Lidocaine cream.     No. [phone]

## 2019-02-28 ENCOUNTER — EXTERNAL CHRONIC CARE MANAGEMENT (OUTPATIENT)
Dept: PRIMARY CARE CLINIC | Facility: CLINIC | Age: 69
End: 2019-02-28
Payer: MEDICARE

## 2019-02-28 PROCEDURE — 99490 PR CHRONIC CARE MGMT, 1ST 20 MIN: ICD-10-PCS | Mod: S$GLB,,, | Performed by: FAMILY MEDICINE

## 2019-02-28 PROCEDURE — 99490 CHRNC CARE MGMT STAFF 1ST 20: CPT | Mod: S$GLB,,, | Performed by: FAMILY MEDICINE

## 2019-03-01 ENCOUNTER — CLINICAL SUPPORT (OUTPATIENT)
Dept: FAMILY MEDICINE | Facility: CLINIC | Age: 69
End: 2019-03-01
Payer: MEDICARE

## 2019-03-01 DIAGNOSIS — R30.0 DYSURIA: Primary | ICD-10-CM

## 2019-03-01 LAB
BILIRUB SERPL-MCNC: ABNORMAL MG/DL
BLOOD, POC UA: 50
GLUCOSE UR QL STRIP: NORMAL
KETONES UR QL STRIP: ABNORMAL
LEUKOCYTE ESTERASE URINE, POC: ABNORMAL
NITRITE, POC UA: POSITIVE
PH, POC UA: 5
PROTEIN, POC: ABNORMAL
SPECIFIC GRAVITY, POC UA: 1.02
UROBILINOGEN, POC UA: NORMAL

## 2019-03-01 PROCEDURE — 87077 CULTURE AEROBIC IDENTIFY: CPT

## 2019-03-01 PROCEDURE — 87086 URINE CULTURE/COLONY COUNT: CPT

## 2019-03-01 PROCEDURE — 81000 URINALYSIS NONAUTO W/SCOPE: CPT | Mod: S$GLB,,, | Performed by: FAMILY MEDICINE

## 2019-03-01 PROCEDURE — 87088 URINE BACTERIA CULTURE: CPT

## 2019-03-01 PROCEDURE — 87186 SC STD MICRODIL/AGAR DIL: CPT

## 2019-03-01 PROCEDURE — 81000 POCT URINALYSIS: ICD-10-PCS | Mod: S$GLB,,, | Performed by: FAMILY MEDICINE

## 2019-03-04 ENCOUNTER — TELEPHONE (OUTPATIENT)
Dept: FAMILY MEDICINE | Facility: CLINIC | Age: 69
End: 2019-03-04

## 2019-03-04 LAB — BACTERIA UR CULT: NORMAL

## 2019-03-04 RX ORDER — NITROFURANTOIN 25; 75 MG/1; MG/1
100 CAPSULE ORAL 2 TIMES DAILY
Qty: 14 CAPSULE | Refills: 0 | Status: SHIPPED | OUTPATIENT
Start: 2019-03-04 | End: 2019-03-11

## 2019-03-04 NOTE — TELEPHONE ENCOUNTER
----- Message from BERENICE Rodriguez sent at 3/4/2019 10:27 AM CST -----  Please inform patient I will send rx for UTI to pharmacy for her.

## 2019-03-26 RX ORDER — LOSARTAN POTASSIUM 50 MG/1
TABLET ORAL
Qty: 30 TABLET | Refills: 2 | Status: SHIPPED | OUTPATIENT
Start: 2019-03-26 | End: 2019-05-22 | Stop reason: SDUPTHER

## 2019-03-31 ENCOUNTER — EXTERNAL CHRONIC CARE MANAGEMENT (OUTPATIENT)
Dept: PRIMARY CARE CLINIC | Facility: CLINIC | Age: 69
End: 2019-03-31
Payer: MEDICARE

## 2019-03-31 PROCEDURE — 99487 PR COMPLX CHRON CARE MGMT, 1ST HR, PER MONTH: ICD-10-PCS | Mod: S$GLB,,, | Performed by: FAMILY MEDICINE

## 2019-03-31 PROCEDURE — 99487 CPLX CHRNC CARE 1ST 60 MIN: CPT | Mod: S$GLB,,, | Performed by: FAMILY MEDICINE

## 2019-04-10 ENCOUNTER — PES CALL (OUTPATIENT)
Dept: ADMINISTRATIVE | Facility: CLINIC | Age: 69
End: 2019-04-10

## 2019-04-17 ENCOUNTER — PATIENT OUTREACH (OUTPATIENT)
Dept: ADMINISTRATIVE | Facility: HOSPITAL | Age: 69
End: 2019-04-17

## 2019-04-17 ENCOUNTER — TELEPHONE (OUTPATIENT)
Dept: FAMILY MEDICINE | Facility: CLINIC | Age: 69
End: 2019-04-17

## 2019-04-17 DIAGNOSIS — R11.0 NAUSEA: ICD-10-CM

## 2019-04-17 DIAGNOSIS — R19.7 DIARRHEA, UNSPECIFIED TYPE: ICD-10-CM

## 2019-04-17 DIAGNOSIS — N39.0 URINARY TRACT INFECTION WITHOUT HEMATURIA, SITE UNSPECIFIED: Primary | ICD-10-CM

## 2019-04-17 RX ORDER — CIPROFLOXACIN 500 MG/1
500 TABLET ORAL 2 TIMES DAILY
Qty: 20 TABLET | Refills: 0 | Status: SHIPPED | OUTPATIENT
Start: 2019-04-17 | End: 2019-04-27

## 2019-04-17 RX ORDER — DIPHENOXYLATE HYDROCHLORIDE AND ATROPINE SULFATE 2.5; .025 MG/1; MG/1
1 TABLET ORAL 4 TIMES DAILY PRN
Qty: 30 TABLET | Refills: 0 | Status: SHIPPED | OUTPATIENT
Start: 2019-04-17 | End: 2019-05-22 | Stop reason: SDUPTHER

## 2019-04-17 NOTE — TELEPHONE ENCOUNTER
Patient requesting a refill on Lomotil and Cipro said she having a UTI and had her gallbladder removed since having it removed she had diarrhea patient is unable to come for an OV she have an appt on 5-1-19 patient requesting that you ok her refills and sent to the pharmacy.

## 2019-04-17 NOTE — TELEPHONE ENCOUNTER
----- Message from Leona Wells sent at 4/17/2019  3:31 PM CDT -----  Contact: Self/ [phone]  Patient called in to get prescription refill. Please call and advise.     diphenoxylate-atropine 2.5-0.025 mg (LOMOTIL) 2.5-0.025 mg per tablet    Ciprofloxacin acl 500 mg    [facility] - [place] [address]

## 2019-04-30 ENCOUNTER — EXTERNAL CHRONIC CARE MANAGEMENT (OUTPATIENT)
Dept: PRIMARY CARE CLINIC | Facility: CLINIC | Age: 69
End: 2019-04-30
Payer: MEDICARE

## 2019-04-30 PROCEDURE — 99487 CPLX CHRNC CARE 1ST 60 MIN: CPT | Mod: S$GLB,,, | Performed by: FAMILY MEDICINE

## 2019-04-30 PROCEDURE — 99487 PR COMPLX CHRON CARE MGMT, 1ST HR, PER MONTH: ICD-10-PCS | Mod: S$GLB,,, | Performed by: FAMILY MEDICINE

## 2019-05-16 ENCOUNTER — OFFICE VISIT (OUTPATIENT)
Dept: NEUROLOGY | Facility: HOSPITAL | Age: 69
End: 2019-05-16
Attending: SURGERY
Payer: MEDICARE

## 2019-05-16 ENCOUNTER — TELEPHONE (OUTPATIENT)
Dept: NEUROLOGY | Facility: HOSPITAL | Age: 69
End: 2019-05-16

## 2019-05-16 ENCOUNTER — TELEPHONE (OUTPATIENT)
Dept: FAMILY MEDICINE | Facility: CLINIC | Age: 69
End: 2019-05-16

## 2019-05-16 VITALS
TEMPERATURE: 98 F | HEIGHT: 66 IN | DIASTOLIC BLOOD PRESSURE: 74 MMHG | SYSTOLIC BLOOD PRESSURE: 189 MMHG | WEIGHT: 187.5 LBS | BODY MASS INDEX: 30.13 KG/M2 | HEART RATE: 74 BPM

## 2019-05-16 DIAGNOSIS — R11.0 NAUSEA: ICD-10-CM

## 2019-05-16 DIAGNOSIS — C7A.092 MALIGNANT CARCINOID TUMOR OF STOMACH: ICD-10-CM

## 2019-05-16 DIAGNOSIS — C7B.8 SECONDARY NEUROENDOCRINE TUMOR OF DISTANT LYMPH NODES: ICD-10-CM

## 2019-05-16 DIAGNOSIS — C7B.8 SECONDARY NEUROENDOCRINE TUMOR OF LIVER: ICD-10-CM

## 2019-05-16 DIAGNOSIS — R19.7 DIARRHEA, UNSPECIFIED TYPE: ICD-10-CM

## 2019-05-16 LAB
EXT 24 HR UR METANEPHRINE: ABNORMAL
EXT 24 HR UR NORMETANEPHRINE: ABNORMAL
EXT 24 HR UR NORMETANEPHRINE: ABNORMAL
EXT 25 HYDROXY VIT D2: ABNORMAL
EXT 25 HYDROXY VIT D3: ABNORMAL
EXT 5 HIAA 24 HR URINE: ABNORMAL
EXT 5 HIAA BLOOD: 367 NG/ML (ref 0–22)
EXT ACTH: ABNORMAL
EXT AFP: ABNORMAL
EXT ALBUMIN: 3.8 G/DL (ref 3.6–5.1)
EXT ALKALINE PHOSPHATASE: 147 U/L (ref 33–130)
EXT ALT: 14 U/L (ref 6–29)
EXT AMYLASE: ABNORMAL
EXT ANTI ISLET CELL AB: ABNORMAL
EXT ANTI PARIETAL CELL AB: ABNORMAL
EXT ANTI THYROID AB: ABNORMAL
EXT AST: 15 U/L (ref 10–35)
EXT BILIRUBIN DIRECT: ABNORMAL
EXT BILIRUBIN TOTAL: 0.5 MG/DL (ref 0.2–1.2)
EXT BK VIRUS DNA QN PCR: ABNORMAL
EXT BUN: 19 MG/DL (ref 7–25)
EXT C PEPTIDE: ABNORMAL
EXT CA 125: ABNORMAL
EXT CA 19-9: ABNORMAL
EXT CA 27-29: ABNORMAL
EXT CALCITONIN: ABNORMAL
EXT CALCIUM: 9.3 MG/DL (ref 8.6–10.4)
EXT CEA: ABNORMAL
EXT CHLORIDE: 103 MMOL/L (ref 98–110)
EXT CHOLESTEROL: ABNORMAL
EXT CHROMOGRANIN A: ABNORMAL
EXT CO2: 26 MMOL/L (ref 20–32)
EXT CREATININE UA: ABNORMAL
EXT CREATININE: 1.38 MG/DL (ref 0.5–0.99)
EXT CYCLOSPORONE LEVEL: ABNORMAL
EXT DOPAMINE: ABNORMAL
EXT EBV DNA BY PCR: ABNORMAL
EXT EPINEPHRINE: ABNORMAL
EXT FOLATE: ABNORMAL
EXT FREE T3: ABNORMAL
EXT FREE T4: ABNORMAL
EXT FSH: ABNORMAL
EXT GASTRIN RELEASING PEPTIDE: ABNORMAL
EXT GASTRIN RELEASING PEPTIDE: ABNORMAL
EXT GASTRIN: ABNORMAL
EXT GGT: ABNORMAL
EXT GHRELIN: ABNORMAL
EXT GLUCAGON: ABNORMAL
EXT GLUCOSE: 112 MG/DL (ref 65–99)
EXT GROWTH HORMONE: ABNORMAL
EXT HCV RNA QUANT PCR: ABNORMAL
EXT HDL: ABNORMAL
EXT HEMATOCRIT: 31 % (ref 35–45)
EXT HEMOGLOBIN A1C: ABNORMAL %
EXT HEMOGLOBIN: 10 G/DL (ref 11.7–15.5)
EXT HISTAMINE 24 HR URINE: ABNORMAL
EXT HISTAMINE: ABNORMAL
EXT IGF-1: ABNORMAL
EXT IMMUNKNOW (NON-STIMULATED): ABNORMAL
EXT IMMUNKNOW (STIMULATED): ABNORMAL
EXT INR: ABNORMAL
EXT INSULIN: ABNORMAL
EXT LANREOTIDE LEVEL: ABNORMAL
EXT LDH, TOTAL: ABNORMAL
EXT LDL CHOLESTEROL: ABNORMAL
EXT LIPASE: ABNORMAL
EXT MAGNESIUM: ABNORMAL
EXT METANEPHRINE FREE PLASMA: ABNORMAL
EXT MOTILIN: ABNORMAL
EXT NEUROKININ A CAMB: ABNORMAL
EXT NEUROKININ A ISI: 17 PG/ML (ref 0–40)
EXT NEUROTENSIN: ABNORMAL
EXT NOREPINEPHRINE: ABNORMAL
EXT NORMETANEPHRINE: ABNORMAL
EXT NSE: ABNORMAL
EXT OCTREOTIDE LEVEL: ABNORMAL
EXT PANCREASTATIN CAMB: ABNORMAL
EXT PANCREASTATIN ISI: 1650 PG/ML (ref 10–135)
EXT PANCREATIC POLYPEPTIDE: ABNORMAL
EXT PHOSPHORUS: ABNORMAL
EXT PLATELETS: 231 1000/UL (ref 140–400)
EXT POTASSIUM: 4.2 MMOL/L (ref 3.5–5.3)
EXT PROGRAF LEVEL: ABNORMAL
EXT PROLACTIN: ABNORMAL
EXT PROTEIN TOTAL: 7.1 G/DL (ref 6.1–8.1)
EXT PROTEIN UA: ABNORMAL
EXT PT: ABNORMAL
EXT PTH, INTACT: ABNORMAL
EXT PTT: ABNORMAL
EXT RAPAMUNE LEVEL: ABNORMAL
EXT SEROTONIN: 1923 NG/ML (ref 56–244)
EXT SODIUM: 139 MMOL/L (ref 135–146)
EXT SOMATOSTATIN: ABNORMAL
EXT SUBSTANCE P: ABNORMAL
EXT TRIGLYCERIDES: ABNORMAL
EXT TRYPTASE: ABNORMAL
EXT TSH: ABNORMAL
EXT URIC ACID: ABNORMAL
EXT URINE AMYLASE U/HR: ABNORMAL
EXT URINE AMYLASE U/L: ABNORMAL
EXT VASOACTIVE INTESTINAL POLYPEPTIDE: ABNORMAL
EXT VITAMIN B12: ABNORMAL
EXT VMA 24 HR URINE: ABNORMAL
EXT WBC: 7.1 1000/UL (ref 3.8–10.8)
NEURON SPECIFIC ENOLASE: ABNORMAL

## 2019-05-16 PROCEDURE — 99213 OFFICE O/P EST LOW 20 MIN: CPT | Performed by: SURGERY

## 2019-05-16 RX ORDER — DIPHENOXYLATE HYDROCHLORIDE AND ATROPINE SULFATE 2.5; .025 MG/1; MG/1
1 TABLET ORAL 4 TIMES DAILY PRN
Qty: 45 TABLET | Refills: 1 | Status: SHIPPED | OUTPATIENT
Start: 2019-05-16 | End: 2019-05-22 | Stop reason: SDUPTHER

## 2019-05-16 RX ORDER — PROMETHAZINE HYDROCHLORIDE 12.5 MG/1
25 TABLET ORAL EVERY 6 HOURS PRN
Qty: 30 TABLET | Refills: 1 | Status: SHIPPED | OUTPATIENT
Start: 2019-05-16 | End: 2019-07-01 | Stop reason: SDUPTHER

## 2019-05-16 NOTE — PROGRESS NOTES
"NOLANETS:  [facility]  A collaboration between [facility] and [facility]      PATIENT: Patito Domingo  MRN: [id number]  DATE: 5/16/2019    Subjective:      Chief Complaint: Follow-up (follow up)  suffering from kidney stones both sides    Finally eating better and gaining weight    Diarrhea was better but started back again now    Vitals:   Vitals:    05/16/19 1233   BP: (!) 189/74   Pulse: 74   Temp: 98 °F (36.7 °C)   TempSrc: Oral   Weight: 85.1 kg (187 lb 8 oz)   Height: 5' 6" (1.676 m)        Karnofsky Score:     Diagnosis: No diagnosis found.     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)        Past Surgical History:  Past Surgical History:   Procedure Laterality Date    CATARACT EXTRACTION Left 10/2017    CHOLECYSTECTOMY      CHOLECYSTECTOMY N/A 12/30/2017    [facility]formed by [facility] OR    COLON SURGERY      cystoscope      EYE SURGERY      HYSTERECTOMY  5/1996    INSERTION-INTRAOCULAR LENS (IOL) Left 10/4/2017    Performed by [facility] Em [facility] at [facility] OR    LITHOTRIPSY      LIVER BIOPSY  9/14    carcinoid    PHACOEMULSIFICATION-ASPIRATION-CATARACT Left 10/4/2017    Performed by [facility] Em [facility] at [facility] OR       Family History:  Family History   Problem Relation Age of Onset    Cancer Mother         unknown    Alzheimer's disease Father     Stroke Sister     No Known Problems Son     No Known Problems Son     No Known Problems Son     No Known Problems Son     Kidney disease Neg Hx        Allergies:  Review of patient's allergies indicates:   Allergen Reactions    Epinephrine Anaphylaxis     Can " cause  a Carcinoid Crisis    Contrast media Hives, Itching and Swelling    Ibuprofen Hives, Itching and Swelling    Iodinated contrast- oral and iv dye     Sulfa (sulfonamide antibiotics) Hives, Itching and Swelling       Medications:  Current Outpatient Medications   Medication Sig Dispense Refill    bumetanide (BUMEX) 0.5 MG Tab Take 1 tablet (0.5 mg total) by mouth once daily. (Patient taking differently: Take 0.5 mg by mouth daily as needed. ) 30 tablet 2    diphenoxylate-atropine 2.5-0.025 mg (LOMOTIL) 2.5-0.025 mg per tablet Take 1 tablet by mouth 4 (four) times daily as needed for Diarrhea. 30 tablet 0    losartan (COZAAR) 50 MG tablet Take 1 tablet (50 mg total) by mouth once daily. 30 tablet 2    losartan (COZAAR) 50 MG tablet TAKE 1 TABLET BY MOUTH DAILY 30 tablet 2    magnesium oxide (MAG-OX) 400 mg (241.3 mg magnesium) tablet Take 1 tablet (400 mg total) by mouth once daily. 30 tablet 2    metoprolol tartrate (LOPRESSOR) 50 MG tablet TAKE 1 TABLET (50 MG TOTAL) BY MOUTH 2 (TWO) TIMES DAILY. 180 tablet 0    oxyCODONE (ROXICODONE) 15 MG Tab TK 1 T PO  Q 4 H  0    promethazine (PHENERGAN) 25 MG tablet Take 1 tablet (25 mg total) by mouth every 6 (six) hours as needed for Nausea. 30 tablet 2    cimetidine (TAGAMET) 300 MG tablet Take 1 tablet (300 mg total) by mouth 4 (four) times daily. 120 tablet 2    CMPD Ketamine 10%- Baclofen 2%- Cyclobenzaprine 2%- Gabapentin 2%- lidocaine 5% in lipoderm base Apply 1 Pump (1.6 g total) topically once daily. 80 g 2    esomeprazole (NEXIUM) 20 MG capsule Take 1 capsule (20 mg total) by mouth before breakfast. 30 capsule 2    ferrous gluconate (FERGON) 324 MG tablet Take 1 tablet (324 mg total) by mouth daily with breakfast. 90 tablet 2    gabapentin (NEURONTIN) 100 MG capsule takes one capsule  capsule 6    lanreotide (SOMATULINE DEPOT) 120 mg/0.5 mL Syrg Inject 120 mg into the skin every 28 days.      lidocaine (LIDODERM) 5 % Place 1 patch onto  the skin once daily. Remove & Discard patch within 12 hours or as directed by MD 30 patch 2    lipase-protease-amylase 12,000-38,000-60,000 units (CREON) CpDR Take 1 capsule by mouth 3 (three) times daily with meals. 90 capsule 11     No current facility-administered medications for this visit.        Review of Systems   Constitutional: Positive for appetite change and fatigue. Negative for activity change, chills, diaphoresis and unexpected weight change.   HENT: Negative for congestion, drooling, ear discharge, facial swelling, hearing loss, mouth sores, nosebleeds, postnasal drip, sinus pressure, sinus pain, sneezing, sore throat, tinnitus and trouble swallowing.    Eyes: Negative for photophobia, pain, itching and visual disturbance.   Respiratory: Negative for choking, chest tightness and stridor.    Cardiovascular: Negative for chest pain, palpitations and leg swelling.   Gastrointestinal: Positive for abdominal distention, abdominal pain, diarrhea and nausea. Negative for blood in stool, constipation, rectal pain and vomiting.   Endocrine: Negative.    Genitourinary: Negative.    Musculoskeletal: Positive for arthralgias, back pain and gait problem.   Skin: Negative.    Allergic/Immunologic: Negative for environmental allergies and food allergies.   Neurological: Negative for tremors, seizures, syncope, facial asymmetry, speech difficulty, weakness, numbness and headaches.   Hematological: Negative.    Psychiatric/Behavioral: Negative for confusion.      Objective:      Physical Exam   Constitutional: She is oriented to person, place, and time. No distress.   HENT:   Head: Normocephalic and atraumatic.   Right Ear: External ear normal.   Left Ear: External ear normal.   Eyes: Pupils are equal, round, and reactive to light. Conjunctivae and EOM are normal.   Neck: Normal range of motion.   Cardiovascular: Normal rate and regular rhythm.   Pulmonary/Chest: Effort normal and breath sounds normal.   Abdominal:  Soft. Bowel sounds are normal. She exhibits no mass. There is no tenderness. There is no rebound. No hernia.   Incision stable   Musculoskeletal: Normal range of motion.   Neurological: She is alert and oriented to person, place, and time.   Skin: Skin is warm. She is not diaphoretic.   Psychiatric: She has a normal mood and affect. Her behavior is normal.      Assessment:       No diagnosis found.    Laboratory Data:     Results for ALEXIS GORDON (MRN [id number]) as of 5/17/2019 19:06   Ref. Range 2/1/2019 14:39 2/1/2019 14:40 2/1/2019 14:40 3/1/2019 14:27 3/1/2019 14:29   Glucose, UA Unknown -   NORMAL    Ketones, UA Unknown -   -    WBC, UA Unknown ++   ++    CULTURE, URINE Unknown  Rpt   Rpt   Urine Culture, Routine Unknown  ESCHERICHIA COLI... PROTEUS MIRABILIS...  ESCHERICHIA COLI...   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  She underwent chemo embolization once and has a significant issue with the pain management.  She was referred to Pain Management service and she would not comply with recommendations.    She was lost to follow-up for some time.  Now she is interested in getting back to her neuroendocrine tumor follow-up.  I she needs to have a workup with carcinoid labs CT MRI and OctreoScan.  She cancers few appointments because of that transport issues that she has.  He even cancel had gallium 1 time therefore would not schedule her for gallium at this point.  After doing all the tests we would discussed at the tumor board and address the specific issues of pain management overall.  I    I emphasized that need for nutrition and adequate physical  activity  Plan:       Carcinoid labs  To be done today  [facility]  Will discuss with the tumor board and plan on future intervention    She clearly states now her pain issues are taken care of.  She is not on any pain medication at this point.  She is interested in pursuing for care for her neuroendocrine tumor.  Next I had a long times talking to her and addressing the issues of pain and the need for all the procedures once the scan is done she is willing to comply with our recommendations                  AMY Perez MD, FACS   Associate Professor of Surgery, [facility]   Neuroendocrine Surgery, Hepatic/Pancreatic & General Surgery   [address], [address]   [place] [address]   ph. [phone]; [phone]   fax. [phone]

## 2019-05-16 NOTE — TELEPHONE ENCOUNTER
----- Message from Barbie Shea sent at 5/16/2019  1:34 PM CDT -----  Contact: Self [phone]  Patient would like to speak with you about getting the name of the antibiotics she takes. Please advise

## 2019-05-16 NOTE — TELEPHONE ENCOUNTER
----- Message from Simran Alicea sent at 5/16/2019 10:58 AM CDT -----  Contact: [phone]/SELF  BRIAN  Type:  Needs Medical Advice    Who Called: PT  Symptoms (please be specific): DIARRHEA, VOMITING   How long has patient had these symptoms: TODAY  Pharmacy name and phone #: [facility] [phone]  Would the patient rather a call back or a response via [facility]? CALL BACK  Best Call Back Number: [phone]  Additional Information:

## 2019-05-16 NOTE — PATIENT INSTRUCTIONS
You will be notified with tumor board recommendations.      Tumor Markers, form given to pt.    Scan ordered, pt request to call to order, scheduling number given.

## 2019-05-22 ENCOUNTER — OFFICE VISIT (OUTPATIENT)
Dept: FAMILY MEDICINE | Facility: CLINIC | Age: 69
End: 2019-05-22
Payer: MEDICARE

## 2019-05-22 VITALS
WEIGHT: 189.38 LBS | RESPIRATION RATE: 16 BRPM | DIASTOLIC BLOOD PRESSURE: 68 MMHG | TEMPERATURE: 98 F | HEART RATE: 75 BPM | BODY MASS INDEX: 30.44 KG/M2 | HEIGHT: 66 IN | SYSTOLIC BLOOD PRESSURE: 164 MMHG | OXYGEN SATURATION: 98 %

## 2019-05-22 DIAGNOSIS — Z11.59 NEED FOR HEPATITIS C SCREENING TEST: ICD-10-CM

## 2019-05-22 DIAGNOSIS — R31.9 HEMATURIA, UNSPECIFIED TYPE: ICD-10-CM

## 2019-05-22 DIAGNOSIS — R60.0 LOWER EXTREMITY EDEMA: ICD-10-CM

## 2019-05-22 DIAGNOSIS — M25.50 ARTHRALGIA, UNSPECIFIED JOINT: ICD-10-CM

## 2019-05-22 DIAGNOSIS — R30.0 DYSURIA: Primary | ICD-10-CM

## 2019-05-22 DIAGNOSIS — R19.7 DIARRHEA, UNSPECIFIED TYPE: ICD-10-CM

## 2019-05-22 DIAGNOSIS — I1A.0 RESISTANT HYPERTENSION: ICD-10-CM

## 2019-05-22 DIAGNOSIS — Z87.442 HISTORY OF KIDNEY STONES: ICD-10-CM

## 2019-05-22 DIAGNOSIS — C78.7 SECONDARY MALIGNANT NEOPLASM OF LIVER: ICD-10-CM

## 2019-05-22 LAB
BILIRUB SERPL-MCNC: NEGATIVE MG/DL
BLOOD, POC UA: NORMAL
GLUCOSE UR QL STRIP: NEGATIVE
KETONES UR QL STRIP: NORMAL
LEUKOCYTE ESTERASE URINE, POC: NEGATIVE
NITRITE, POC UA: POSITIVE
PH, POC UA: 5
PROTEIN, POC: 30
SPECIFIC GRAVITY, POC UA: 1.02
UROBILINOGEN, POC UA: 0.2

## 2019-05-22 PROCEDURE — 99999 PR PBB SHADOW E&M-EST. PATIENT-LVL IV: CPT | Mod: PBBFAC,,, | Performed by: FAMILY MEDICINE

## 2019-05-22 PROCEDURE — 3078F DIAST BP <80 MM HG: CPT | Mod: CPTII,S$GLB,, | Performed by: FAMILY MEDICINE

## 2019-05-22 PROCEDURE — 87077 CULTURE AEROBIC IDENTIFY: CPT

## 2019-05-22 PROCEDURE — 87186 SC STD MICRODIL/AGAR DIL: CPT

## 2019-05-22 PROCEDURE — 81000 URINALYSIS NONAUTO W/SCOPE: CPT | Mod: S$GLB,,, | Performed by: FAMILY MEDICINE

## 2019-05-22 PROCEDURE — 3077F PR MOST RECENT SYSTOLIC BLOOD PRESSURE >= 140 MM HG: ICD-10-PCS | Mod: CPTII,S$GLB,, | Performed by: FAMILY MEDICINE

## 2019-05-22 PROCEDURE — 99215 OFFICE O/P EST HI 40 MIN: CPT | Mod: 25,S$GLB,, | Performed by: FAMILY MEDICINE

## 2019-05-22 PROCEDURE — 99215 PR OFFICE/OUTPT VISIT, EST, LEVL V, 40-54 MIN: ICD-10-PCS | Mod: 25,S$GLB,, | Performed by: FAMILY MEDICINE

## 2019-05-22 PROCEDURE — 87086 URINE CULTURE/COLONY COUNT: CPT

## 2019-05-22 PROCEDURE — 99999 PR PBB SHADOW E&M-EST. PATIENT-LVL IV: ICD-10-PCS | Mod: PBBFAC,,, | Performed by: FAMILY MEDICINE

## 2019-05-22 PROCEDURE — 87088 URINE BACTERIA CULTURE: CPT

## 2019-05-22 PROCEDURE — 81000 POCT URINALYSIS: ICD-10-PCS | Mod: S$GLB,,, | Performed by: FAMILY MEDICINE

## 2019-05-22 PROCEDURE — 3077F SYST BP >= 140 MM HG: CPT | Mod: CPTII,S$GLB,, | Performed by: FAMILY MEDICINE

## 2019-05-22 PROCEDURE — 3078F PR MOST RECENT DIASTOLIC BLOOD PRESSURE < 80 MM HG: ICD-10-PCS | Mod: CPTII,S$GLB,, | Performed by: FAMILY MEDICINE

## 2019-05-22 PROCEDURE — 1101F PT FALLS ASSESS-DOCD LE1/YR: CPT | Mod: CPTII,S$GLB,, | Performed by: FAMILY MEDICINE

## 2019-05-22 PROCEDURE — 1101F PR PT FALLS ASSESS DOC 0-1 FALLS W/OUT INJ PAST YR: ICD-10-PCS | Mod: CPTII,S$GLB,, | Performed by: FAMILY MEDICINE

## 2019-05-22 RX ORDER — CIPROFLOXACIN 500 MG/1
500 TABLET ORAL 2 TIMES DAILY
Qty: 20 TABLET | Refills: 0 | Status: SHIPPED | OUTPATIENT
Start: 2019-05-22 | End: 2019-06-01

## 2019-05-22 RX ORDER — DIPHENOXYLATE HYDROCHLORIDE AND ATROPINE SULFATE 2.5; .025 MG/1; MG/1
1 TABLET ORAL 4 TIMES DAILY PRN
Qty: 45 TABLET | Refills: 1 | Status: SHIPPED | OUTPATIENT
Start: 2019-05-22 | End: 2019-06-01

## 2019-05-22 RX ORDER — METOPROLOL TARTRATE 50 MG/1
50 TABLET ORAL 2 TIMES DAILY
Qty: 180 TABLET | Refills: 0 | Status: SHIPPED | OUTPATIENT
Start: 2019-05-22 | End: 2019-08-27 | Stop reason: SDUPTHER

## 2019-05-22 NOTE — PROGRESS NOTES
HPI:  Patito Domingo is a 68 y.o. year old female that  presents with concern about pain with urination. She feels that she may have another kidney stone. She may see some blood in her urine as well. She is on a rolling walker and states that she never did get her walking cane.She would like a handicap sticker.  Chief Complaint   Patient presents with    Follow-up     3 Month F/U    Urinary Tract Infection    Arthritis     pain in arms    Kidney Stones    Leg Swelling   .           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)      Social History     Socioeconomic History    Marital status:      Spouse name: Not on file    Number of children: Not on file    Years of education: Not on file    Highest education level: Not on file   Occupational History    Occupation: disabled    Social Needs    Financial resource strain: Not on file    Food insecurity:     Worry: Not on file     Inability: Not on file    Transportation needs:     Medical: Not on file     Non-medical: Not on file   Tobacco Use    Smoking status: Never Smoker    Smokeless tobacco: Never Used   Substance and Sexual Activity    Alcohol use: No    Drug use: No    Sexual activity: Not Currently   Lifestyle    Physical activity:     Days per week: Not on file     Minutes per session: Not on file    Stress: Not on file   Relationships    Social connections:     Talks on phone: Not on file     Gets together: Not on file     Attends Shinto service: Not on file     Active member of club or organization: Not on file     Attends meetings of clubs or organizations: Not on file     Relationship status: Not on file   Other Topics Concern    Not on file   Social History Narrative    Not on file     Past  "Surgical History:   Procedure Laterality Date    CATARACT EXTRACTION Left 10/2017    CHOLECYSTECTOMY      CHOLECYSTECTOMY N/A 12/30/2017    [facility] OR    COLON SURGERY      cystoscope      EYE SURGERY      HYSTERECTOMY  5/1996    INSERTION-INTRAOCULAR LENS (IOL) Left 10/4/2017    [facility] OR    LITHOTRIPSY      LIVER BIOPSY  9/14    carcinoid    PHACOEMULSIFICATION-ASPIRATION-CATARACT Left 10/4/2017    [facility] by [facility] at [facility] OR     Family History   Problem Relation Age of Onset    Cancer Mother         unknown    Alzheimer's disease Father     Stroke Sister     No Known Problems Son     No Known Problems Son     No Known Problems Son     No Known Problems Son     Kidney disease Neg Hx            Review of Systems  General ROS: negative for chills, fever or weight loss  ENT ROS: negative for epistaxis, sore throat or rhinorrhea  Respiratory ROS: no cough, shortness of breath, or wheezing  Cardiovascular ROS: no chest pain or dyspnea on exertion  Gastrointestinal ROS: no abdominal pain, change in bowel habits, or black/ bloody stools    Physical Exam:  BP (!) 164/68 (BP Location: Right arm, Patient Position: Sitting, BP Method: Large (Manual))   Pulse 75   Temp 98.3 °F (36.8 °C) (Oral)   Resp 16   Ht 5' 6" (1.676 m)   Wt 85.9 kg (189 lb 6.4 oz)   SpO2 98%   BMI 30.57 kg/m²   General appearance: alert, cooperative, no distress  Constitutional:Oriented to person, place, and time.appears well-developed and well-nourished.  HEENT: Normocephalic, atraumatic, neck symmetrical, no nasal discharge, TM- clear bilaterally  Lungs: clear to auscultation bilaterally, no dullness to percussion bilaterally  Heart: regular rate and rhythm without rub; no displacement of the PMI , S1&S2 present  Abdomen: soft, non-tender; bowel sounds normoactive; no organomegaly  Physical Exam      LABS:    Complete Blood Count  Lab Results "   Component Value Date    RBC 2.96 (L) 12/08/2018    HGB 7.8 (L) 12/08/2018    HCT 26.2 (L) 12/08/2018    MCV 89 12/08/2018    MCH 26.4 (L) 12/08/2018    MCHC 29.8 (L) 12/08/2018    RDW 15.4 (H) 12/08/2018     12/08/2018    MPV 10.6 12/08/2018    GRAN 5.0 12/08/2018    GRAN 67.5 12/08/2018    LYMPH 1.3 12/08/2018    LYMPH 18.0 12/08/2018    MONO 1.0 12/08/2018    MONO 13.1 12/08/2018    EOS 0.0 12/08/2018    BASO 0.01 12/08/2018    EOSINOPHIL 0.5 12/08/2018    BASOPHIL 0.1 12/08/2018    DIFFMETHOD Automated 12/08/2018       Comprehensive Metabolic Panel  Lab Results   Component Value Date     12/08/2018    BUN 17 12/08/2018    CREATININE 0.8 12/08/2018     12/08/2018    K 4.5 12/08/2018     12/08/2018    PROT 7.0 12/06/2018    ALBUMIN 2.3 (L) 12/06/2018    BILITOT 0.3 12/06/2018    AST 13 12/06/2018    ALKPHOS 80 12/06/2018    CO2 25 12/08/2018    ALT 10 12/06/2018    ANIONGAP 7 (L) 12/08/2018    EGFRNONAA >60 12/08/2018    ESTGFRAFRICA >60 12/08/2018       LIPID  Lab Results   Component Value Date    CHOL 100 (L) 04/28/2018    HDL 64 04/28/2018         TSH  Lab Results   Component Value Date    TSH 0.896 03/14/2016       Current Outpatient Medications   Medication Sig Dispense Refill    bumetanide (BUMEX) 0.5 MG Tab Take 1 tablet (0.5 mg total) by mouth once daily. (Patient taking differently: Take 0.5 mg by mouth daily as needed. ) 30 tablet 2    CMPD Ketamine 10%- Baclofen 2%- Cyclobenzaprine 2%- Gabapentin 2%- lidocaine 5% in lipoderm base Apply 1 Pump (1.6 g total) topically once daily. 80 g 2    losartan (COZAAR) 50 MG tablet Take 1 tablet (50 mg total) by mouth once daily. 30 tablet 2    magnesium oxide (MAG-OX) 400 mg (241.3 mg magnesium) tablet Take 1 tablet (400 mg total) by mouth once daily. 30 tablet 2    metoprolol tartrate (LOPRESSOR) 50 MG tablet Take 1 tablet (50 mg total) by mouth 2 (two) times daily. 180 tablet 0    oxyCODONE (ROXICODONE) 15 MG Tab TK 1 T PO  Q 4 H   0    promethazine (PHENERGAN) 12.5 MG Tab Take 2 tablets (25 mg total) by mouth every 6 (six) hours as needed. 30 tablet 1    ciprofloxacin HCl (CIPRO) 500 MG tablet Take 1 tablet (500 mg total) by mouth 2 (two) times daily. for 10 days 20 tablet 0    diphenoxylate-atropine 2.5-0.025 mg (LOMOTIL) 2.5-0.025 mg per tablet Take 1 tablet by mouth 4 (four) times daily as needed for Diarrhea. 45 tablet 1     No current facility-administered medications for this visit.        Assessment:    ICD-10-CM ICD-9-CM    1. Dysuria R30.0 788.1 POCT Urinalysis      ciprofloxacin HCl (CIPRO) 500 MG tablet      Urine culture   2. Need for hepatitis C screening test Z11.59 V73.89 Hepatitis C antibody   3. Resistant hypertension I10 401.9 metoprolol tartrate (LOPRESSOR) 50 MG tablet   4. Hematuria, unspecified type R31.9 599.70 POCT Urinalysis      CT Renal Stone Study ABD Pelvis WO      ciprofloxacin HCl (CIPRO) 500 MG tablet      Urine culture   5. History of kidney stones Z87.442 V13.01 POCT Urinalysis   6. Secondary malignant neoplasm of liver C78.7 197.7 Ambulatory Referral to Outpatient Case Management   7. Arthralgia, unspecified joint M25.50 719.40 Ambulatory Referral to Outpatient Case Management   8. Lower extremity edema R60.0 782.3    9. Diarrhea, unspecified type R19.7 787.91 diphenoxylate-atropine 2.5-0.025 mg (LOMOTIL) 2.5-0.025 mg per tablet         Plan:    Follow up in 2 weeks (on 6/6/2019).          Magdalena Hernandez MD

## 2019-05-23 ENCOUNTER — PATIENT OUTREACH (OUTPATIENT)
Dept: ADMINISTRATIVE | Facility: HOSPITAL | Age: 69
End: 2019-05-23

## 2019-05-23 DIAGNOSIS — C7B.8 SECONDARY NEUROENDOCRINE TUMOR OF DISTANT LYMPH NODES: ICD-10-CM

## 2019-05-23 DIAGNOSIS — C7B.8 SECONDARY NEUROENDOCRINE TUMOR OF LIVER: ICD-10-CM

## 2019-05-23 DIAGNOSIS — C7A.092 MALIGNANT CARCINOID TUMOR OF STOMACH: Primary | ICD-10-CM

## 2019-05-23 RX ORDER — PREDNISONE 50 MG/1
TABLET ORAL
Qty: 3 TABLET | Refills: 0 | Status: SHIPPED | OUTPATIENT
Start: 2019-05-23 | End: 2019-06-26

## 2019-05-23 NOTE — TELEPHONE ENCOUNTER
Returned patients call. Advised patient that new updated orders were placed in Epic for Octreoscan. Scrip for Prednisone placed and routed to Dr. Perez to sign. Patient has no further questions at this time.

## 2019-05-23 NOTE — TELEPHONE ENCOUNTER
----- Message from Albertina Wallis sent at 5/22/2019 11:12 AM CDT -----  Contact: self / [phone]  BRIAN: Patient is requesting a call back regarding, her appointment. Please advise

## 2019-05-23 NOTE — TELEPHONE ENCOUNTER
----- Message from Stephanie Diehl sent at 5/23/2019  9:58 AM CDT -----  Contact: [phone]/self  BRIAN-----Patient states her orders are not written correctly.   Please call back to assist at [phone]

## 2019-05-24 ENCOUNTER — TELEPHONE (OUTPATIENT)
Dept: FAMILY MEDICINE | Facility: CLINIC | Age: 69
End: 2019-05-24

## 2019-05-24 LAB — BACTERIA UR CULT: NORMAL

## 2019-05-24 RX ORDER — NITROFURANTOIN (MACROCRYSTALS) 100 MG/1
100 CAPSULE ORAL 4 TIMES DAILY
Qty: 20 CAPSULE | Refills: 0 | Status: SHIPPED | OUTPATIENT
Start: 2019-05-24 | End: 2019-05-29

## 2019-05-24 NOTE — TELEPHONE ENCOUNTER
Called pt and informed her that Dr. Hernandez changed her antibiotics and they were sent to her pharmacy. Pt understood.

## 2019-05-31 ENCOUNTER — EXTERNAL CHRONIC CARE MANAGEMENT (OUTPATIENT)
Dept: PRIMARY CARE CLINIC | Facility: CLINIC | Age: 69
End: 2019-05-31
Payer: MEDICARE

## 2019-05-31 PROCEDURE — 99490 CHRNC CARE MGMT STAFF 1ST 20: CPT | Mod: S$GLB,,, | Performed by: FAMILY MEDICINE

## 2019-05-31 PROCEDURE — 99490 PR CHRONIC CARE MGMT, 1ST 20 MIN: ICD-10-PCS | Mod: S$GLB,,, | Performed by: FAMILY MEDICINE

## 2019-06-03 ENCOUNTER — TELEPHONE (OUTPATIENT)
Dept: FAMILY MEDICINE | Facility: CLINIC | Age: 69
End: 2019-06-03

## 2019-06-03 ENCOUNTER — OUTPATIENT CASE MANAGEMENT (OUTPATIENT)
Dept: ADMINISTRATIVE | Facility: OTHER | Age: 69
End: 2019-06-03

## 2019-06-03 NOTE — PROGRESS NOTES
"Summary:  Spoke with pt regarding enrollment in OPCM -after a brief explanation - pt agreed to enroll - stating she remembered being previously enrolled.  Pt stated that she is currently  Living with her 2nd son and grandson but that she is not happy about it - states that her daughter-in-law- who is a pharmacists helps her with health care understanding and has also assisted her with filling out forms for certain apartment housing.  Pt states that she uses her walker to prevent falls and claims she has not fallen in the past 2 years, however pt also stated that she has been asking also for a cane to prevent falls also.  Pt stated that her biggest problem is that after having chemo she had a couple of falls because she developed "weak legs" and that is also why she cannot drive.  Pt says she uses her insurance provided transportation.  Pt also stated she has at times had white spots in her mouth from chemo (thrush) however she has a solution she is given that she uses and it makes go away - pt reports she has a large packet of stuff/prevention to do on chemo that she was given.  Pt reports that chemo has messed up her teeth and she is planning to go see a dentist soon.  Pt states that she thinks she has 2 more rounds of chemo and then she will have to have surgery for the tumors in her "stomach and kidneys"    Pt was unaware of the [employer] OTC benefits- Pt denies any SOB    Interventions:  Messaged PCP regarding med rec discrepancy and pt's request for a cane for safety reasons  Referred to [facility] for housing barrier support, and advanced care planning  Mailed pt pill box  Mailed information on UIT's and possible prevention  Mailed educational info of fall prevention  Reviewed upcoming appts with pt  Encouraged pt to make a list of questions she was asking for her PCP - I.e. Does MG cause cramps and encouraged pt to keep upcoming appt on 6-6  Encouraged pt to drink water with UTI and not soda's  Mailed [employer] 2019 " catalog        Plan:   Follow-up next week after 6-6 appt with PCP  Assess for further educational opportunities    Todays OPCM Self-Management Care Plan was developed with the patients/caregivers input and was based on identified barriers from todays assessment.  Goals were written today with the patient/caregiver and the patient has agreed to work towards these goals to improve his/her overall well-being. Patient verbalized understanding of the care plan, goals, and all of today's instructions. Encouraged patient/caregiver to communicate with his/her physician and health care team about health conditions and the treatment plan.  Provided my contact information today and encouraged patient/caregiver to call me with any questions as needed.

## 2019-06-03 NOTE — TELEPHONE ENCOUNTER
----- Message from Bea Chu RN sent at 6/3/2019  2:15 PM CDT -----  Contact: Vickie Chu RN OPCM  Good afternoon,    The above pt was enrolled in  Outpatient care management this afternoon.   The med rec reveled the following discrepancies:  CMPD in lipoderm base -1 pump (1.6 gm) topically once daily - not taking    Also pt is requesting a cane for safety issues - states she has a walker but would also like to have a cane available.  Thank you very much for your time and attention in these matters!    Best Regards,    Vickie ([place]) Kimberley MARTELL BSN RN   Outpatient Care Management  [phone]

## 2019-06-03 NOTE — PATIENT INSTRUCTIONS
Fall Due to Dizziness, Weakness, or Loss of Balance  The symptoms that led to your fall have been evaluated. Your doctor feels it is safe for you to return home.  Many things can cause you to become dizzy. Everyone means a little something different by the word dizzy. People may describe their symptoms using these words:  · It doesnt feel right in my head  · It feels like spinning in my head  · It seems like the room is spinning  · My balance feels off  · I feel lightheaded, like I am going to pass out  All these descriptions can have real causes.     Your balance mechanism is in your inner ear. Anything disturbing it can make you feel dizzy, whether it is from a cold, an injury, or many other things. Anything that causes your blood pressure to drop suddenly can make you feel lightheaded, or like you are going to faint. This is because at that moment there might not be enough blood flowing to your brain. Causes include:  · Medicines  · Dehydration  · Standing up or bending over too quickly  · Becoming overheated  · Taking a hot shower or bath  · Straining hard while lifting something or using the toilet  · Strokes, heart attack, heart valve disease, very slow or very fast heart rate  · Low blood sugar  · Ear infection  · Hyperventilation  · Anemia  · Trauma  · Infection  · Panic attack  · Pregnancy  You may be at risk of repeat falls. Take precautions described below to prevent another fall.  Home care  · If you become lightheaded or dizzy, lie down immediately or sit and lean forward with your head down. It is better to do this than fall and seriously hurt or injure yourself.  · Rest today. When changing position, take a moment to be sure any dizziness goes away before standing and walking.  · If you have been prescribed a walker, be sure to use it whenever you walk, even if it is a short distance.  · If you were injured during the fall, follow the advice from your doctor regarding care of your injury.  · Be  sure your doctor knows all the medicines, herbs, and supplements you take. Some medicines can cause dizziness.  Follow-up care  Unless given other advice, call your doctor on the next office day to advise of your fall and to schedule an appointment. You may require further treatment for the underlying condition that caused todays fall.  If X-ray or a CT scan were done, you will be notified of the results, especially if it affects treatment.  Call 911  Call 911 if any of these occur:  · Trouble breathing  · Confused or difficulty arousing  · Fainting or loss of consciousness  · Rapid or very slow heart rate  · Seizure  · Difficulty with speech or vision, weakness of an arm or leg  · Difficulty walking or talking, loss of balance  · Numbness or weakness in one side of your body, facial droop  When to seek medical advice  Call your healthcare provider right away if any of the following occur:  · Another fall  · Continued dizzy spells  · Severe headache  · Blood in vomit or stools (black or red color)  Date Last Reviewed: 11/5/2015  © 7942-7387 [employer]. [address] [place] [address], [place] [address]. All rights reserved. This information is not intended as a substitute for professional medical care. Always follow your healthcare professional's instructions.

## 2019-06-03 NOTE — LETTER
Luz 3, 2019    Patito Domingo  [address]  [place] [address]             [facility] [address]  [place] [address] Welcome to [facility]s Complex Care Management Program.  It was a pleasure talking with you today.  My name is Vickie Chu and I look forward to being your Care Manager.  My goal is to help you function at the healthiest and highest level possible.  You can contact me directly at [phone].    As an [facility] patient with [employer], some of the services we may be able to provide include:      Development of an individualized care plan with a Registered Nurse    Connection with a    Connection with available resources and services     Coordinate communication among your care team members    Provide coaching and education    Help you understand your doctors treatment plan   Help you obtain information about your insurance coverage.     All services provided by [facility]s Complex Care Managers and other care team members are coordinated with and communicated to your primary care team.    As part of your enrollment, you will be receiving education materials and more information about these services in your [facility] account, by phone or through the mail.  If you do not wish to participate or receive information, please contact our office at [phone].      Sincerely,        Vickie Chu ([place]) Rn  [facility]   Out-patient RN Complex Care Manager

## 2019-06-06 ENCOUNTER — OFFICE VISIT (OUTPATIENT)
Dept: FAMILY MEDICINE | Facility: CLINIC | Age: 69
End: 2019-06-06
Payer: MEDICARE

## 2019-06-06 ENCOUNTER — TELEPHONE (OUTPATIENT)
Dept: FAMILY MEDICINE | Facility: CLINIC | Age: 69
End: 2019-06-06

## 2019-06-06 VITALS
DIASTOLIC BLOOD PRESSURE: 76 MMHG | HEART RATE: 75 BPM | OXYGEN SATURATION: 99 % | TEMPERATURE: 98 F | SYSTOLIC BLOOD PRESSURE: 140 MMHG | WEIGHT: 187.19 LBS | BODY MASS INDEX: 30.08 KG/M2 | HEIGHT: 66 IN

## 2019-06-06 DIAGNOSIS — B37.0 THRUSH: ICD-10-CM

## 2019-06-06 DIAGNOSIS — R60.0 LOWER EXTREMITY EDEMA: Primary | ICD-10-CM

## 2019-06-06 DIAGNOSIS — R31.9 HEMATURIA, UNSPECIFIED TYPE: ICD-10-CM

## 2019-06-06 DIAGNOSIS — N20.0 KIDNEY STONE: ICD-10-CM

## 2019-06-06 PROCEDURE — 99214 PR OFFICE/OUTPT VISIT, EST, LEVL IV, 30-39 MIN: ICD-10-PCS | Mod: S$GLB,,, | Performed by: FAMILY MEDICINE

## 2019-06-06 PROCEDURE — 1101F PT FALLS ASSESS-DOCD LE1/YR: CPT | Mod: CPTII,S$GLB,, | Performed by: FAMILY MEDICINE

## 2019-06-06 PROCEDURE — 3078F DIAST BP <80 MM HG: CPT | Mod: CPTII,S$GLB,, | Performed by: FAMILY MEDICINE

## 2019-06-06 PROCEDURE — 3078F PR MOST RECENT DIASTOLIC BLOOD PRESSURE < 80 MM HG: ICD-10-PCS | Mod: CPTII,S$GLB,, | Performed by: FAMILY MEDICINE

## 2019-06-06 PROCEDURE — 99999 PR PBB SHADOW E&M-EST. PATIENT-LVL IV: CPT | Mod: PBBFAC,,, | Performed by: FAMILY MEDICINE

## 2019-06-06 PROCEDURE — 99214 OFFICE O/P EST MOD 30 MIN: CPT | Mod: S$GLB,,, | Performed by: FAMILY MEDICINE

## 2019-06-06 PROCEDURE — 1101F PR PT FALLS ASSESS DOC 0-1 FALLS W/OUT INJ PAST YR: ICD-10-PCS | Mod: CPTII,S$GLB,, | Performed by: FAMILY MEDICINE

## 2019-06-06 PROCEDURE — 3077F SYST BP >= 140 MM HG: CPT | Mod: CPTII,S$GLB,, | Performed by: FAMILY MEDICINE

## 2019-06-06 PROCEDURE — 99999 PR PBB SHADOW E&M-EST. PATIENT-LVL IV: ICD-10-PCS | Mod: PBBFAC,,, | Performed by: FAMILY MEDICINE

## 2019-06-06 PROCEDURE — 3077F PR MOST RECENT SYSTOLIC BLOOD PRESSURE >= 140 MM HG: ICD-10-PCS | Mod: CPTII,S$GLB,, | Performed by: FAMILY MEDICINE

## 2019-06-06 RX ORDER — ESCITALOPRAM OXALATE 10 MG/1
TABLET ORAL
Refills: 3 | COMMUNITY
Start: 2019-05-29 | End: 2019-07-29

## 2019-06-06 RX ORDER — NYSTATIN 100000 [USP'U]/ML
6 SUSPENSION ORAL 4 TIMES DAILY
COMMUNITY
End: 2019-06-06 | Stop reason: SDUPTHER

## 2019-06-06 RX ORDER — NYSTATIN 100000 [USP'U]/ML
6 SUSPENSION ORAL 4 TIMES DAILY
Status: CANCELLED | OUTPATIENT
Start: 2019-06-06

## 2019-06-06 RX ORDER — NYSTATIN 100000 [USP'U]/ML
6 SUSPENSION ORAL 4 TIMES DAILY
OUTPATIENT
Start: 2019-06-06

## 2019-06-06 RX ORDER — NYSTATIN 100000 [USP'U]/ML
6 SUSPENSION ORAL 4 TIMES DAILY
Qty: 60 ML | Refills: 0 | Status: SHIPPED | OUTPATIENT
Start: 2019-06-06 | End: 2019-07-30 | Stop reason: SDUPTHER

## 2019-06-06 RX ORDER — BUMETANIDE 0.5 MG/1
0.5 TABLET ORAL DAILY PRN
Qty: 30 TABLET | Refills: 2 | Status: SHIPPED | OUTPATIENT
Start: 2019-06-06 | End: 2019-08-26 | Stop reason: SDUPTHER

## 2019-06-06 NOTE — TELEPHONE ENCOUNTER
Called patient and informed her that Dr. Hernandez will refill her medication, and it will be sent to her pharmacy for pickup.

## 2019-06-06 NOTE — PROGRESS NOTES
HPI:  Patito Domingo is a 68 y.o. year old female that  presents with she has completed the antibiotics.She does not have scheduled appt with her nephrology.[facility] would like a cane for stability.   Chief Complaint   Patient presents with    Follow-up     test results     Nephrolithiasis     pt states she has right lower back pain .   .           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)      Social History     Socioeconomic History    Marital status:      Spouse name: Not on file    Number of children: Not on file    Years of education: Not on file    Highest education level: Not on file   Occupational History    Occupation: disabled    Social Needs    Financial resource strain: Not on file    Food insecurity:     Worry: Not on file     Inability: Not on file    Transportation needs:     Medical: Not on file     Non-medical: Not on file   Tobacco Use    Smoking status: Never Smoker    Smokeless tobacco: Never Used   Substance and Sexual Activity    Alcohol use: No    Drug use: No    Sexual activity: Not Currently   Lifestyle    Physical activity:     Days per week: Not on file     Minutes per session: Not on file    Stress: Not on file   Relationships    Social connections:     Talks on phone: Not on file     Gets together: Not on file     Attends Sikhism service: Not on file     Active member of club or organization: Not on file     Attends meetings of clubs or organizations: Not on file     Relationship status: Not on file   Other Topics Concern    Not on file   Social History Narrative    Not on file     Past Surgical History:   Procedure Laterality Date    CATARACT EXTRACTION Left 10/2017    CHOLECYSTECTOMY      CHOLECYSTECTOMY N/A 12/30/2017     "[facility]    COLON SURGERY      cystoscope      EYE SURGERY      HYSTERECTOMY  5/1996    INSERTION-INTRAOCULAR LENS (IOL) Left 10/4/2017    [facility] at [facility] OR    LITHOTRIPSY      LIVER BIOPSY  9/14    carcinoid    PHACOEMULSIFICATION-ASPIRATION-CATARACT Left 10/4/2017    [facility] by [facility] at [facility] OR     Family History   Problem Relation Age of Onset    Cancer Mother         unknown    Alzheimer's disease Father     Stroke Sister     No Known Problems Son     No Known Problems Son     No Known Problems Son     No Known Problems Son     Kidney disease Neg Hx            Review of Systems  General ROS: negative for chills, fever or weight loss  ENT ROS: negative for epistaxis, sore throat or rhinorrhea  Respiratory ROS: no cough, shortness of breath, or wheezing  Cardiovascular ROS: no chest pain or dyspnea on exertion  Gastrointestinal ROS: no abdominal pain, change in bowel habits, or black/ bloody stools    Physical Exam:  BP (!) 140/76   Pulse 75   Temp 98.2 °F (36.8 °C) (Oral)   Ht 5' 6" (1.676 m)   Wt 84.9 kg (187 lb 2.7 oz)   SpO2 99%   BMI 30.21 kg/m²   General appearance: alert, cooperative, no distress  Constitutional:Oriented to person, place, and time.appears well-developed and well-nourished.  HEENT: Normocephalic, atraumatic, neck symmetrical, no nasal discharge, TM- clear bilaterally  Lungs: clear to auscultation bilaterally, no dullness to percussion bilaterally  Heart: regular rate and rhythm without rub; no displacement of the PMI , S1&S2 present  Abdomen: soft, non-tender; bowel sounds normoactive; no organomegaly  Physical Exam      LABS:    Complete Blood Count  Lab Results   Component Value Date    RBC 2.96 (L) 12/08/2018    HGB 7.8 (L) 12/08/2018    HCT 26.2 (L) 12/08/2018    MCV 89 12/08/2018    MCH 26.4 (L) 12/08/2018    MCHC 29.8 (L) 12/08/2018    RDW 15.4 (H) 12/08/2018     12/08/2018    MPV " 10.6 12/08/2018    GRAN 5.0 12/08/2018    GRAN 67.5 12/08/2018    LYMPH 1.3 12/08/2018    LYMPH 18.0 12/08/2018    MONO 1.0 12/08/2018    MONO 13.1 12/08/2018    EOS 0.0 12/08/2018    BASO 0.01 12/08/2018    EOSINOPHIL 0.5 12/08/2018    BASOPHIL 0.1 12/08/2018    DIFFMETHOD Automated 12/08/2018       Comprehensive Metabolic Panel  Lab Results   Component Value Date     12/08/2018    BUN 17 12/08/2018    CREATININE 0.8 12/08/2018     12/08/2018    K 4.5 12/08/2018     12/08/2018    PROT 7.0 12/06/2018    ALBUMIN 2.3 (L) 12/06/2018    BILITOT 0.3 12/06/2018    AST 13 12/06/2018    ALKPHOS 80 12/06/2018    CO2 25 12/08/2018    ALT 10 12/06/2018    ANIONGAP 7 (L) 12/08/2018    EGFRNONAA >60 12/08/2018    ESTGFRAFRICA >60 12/08/2018       LIPID  Lab Results   Component Value Date    CHOL 100 (L) 04/28/2018    HDL 64 04/28/2018         TSH  Lab Results   Component Value Date    TSH 0.896 03/14/2016       Current Outpatient Medications   Medication Sig Dispense Refill    bumetanide (BUMEX) 0.5 MG Tab Take 1 tablet (0.5 mg total) by mouth daily as needed. 30 tablet 2    losartan (COZAAR) 50 MG tablet Take 1 tablet (50 mg total) by mouth once daily. 30 tablet 2    magnesium oxide (MAG-OX) 400 mg (241.3 mg magnesium) tablet Take 1 tablet (400 mg total) by mouth once daily. 30 tablet 2    metoprolol tartrate (LOPRESSOR) 50 MG tablet Take 1 tablet (50 mg total) by mouth 2 (two) times daily. 180 tablet 0    nystatin (MYCOSTATIN) 100,000 unit/mL suspension Take 6 mLs (600,000 Units total) by mouth 4 (four) times daily. 60 mL 0    oxyCODONE (ROXICODONE) 15 MG Tab TK 1 T PO  Q 4 H  0    promethazine (PHENERGAN) 12.5 MG Tab Take 2 tablets (25 mg total) by mouth every 6 (six) hours as needed. 30 tablet 1    CMPD Ketamine 10%- Baclofen 2%- Cyclobenzaprine 2%- Gabapentin 2%- lidocaine 5% in lipoderm base Apply 1 Pump (1.6 g total) topically once daily. 80 g 2    escitalopram oxalate (LEXAPRO) 10 MG tablet TK  1 T PO  D  3    predniSONE (DELTASONE) 50 MG Tab Take 1 tab 13 hours prior to scan, Take 1 tab 7 hours prior to scan, & Take 1 tab 1 hour prior to scan. 3 tablet 0     No current facility-administered medications for this visit.        Assessment:    ICD-10-CM ICD-9-CM    1. Lower extremity edema R60.0 782.3 bumetanide (BUMEX) 0.5 MG Tab      CANE FOR HOME USE   2. Hematuria, unspecified type R31.9 599.70 Ambulatory Referral to Nephrology   3. Thrush B37.0 112.0 nystatin (MYCOSTATIN) 100,000 unit/mL suspension   4. Kidney stone N20.0 592.0 Ambulatory Referral to Nephrology         Plan:    Follow up in about 3 months (around 9/6/2019).          Magdalena Hernandez MD

## 2019-06-10 ENCOUNTER — TELEPHONE (OUTPATIENT)
Dept: FAMILY MEDICINE | Facility: CLINIC | Age: 69
End: 2019-06-10

## 2019-06-11 ENCOUNTER — OUTPATIENT CASE MANAGEMENT (OUTPATIENT)
Dept: ADMINISTRATIVE | Facility: OTHER | Age: 69
End: 2019-06-11

## 2019-06-11 NOTE — PROGRESS NOTES
Summary:  Spoke with pt regarding follow-up - pt states that she has no energy - and her legs have been weak ever since she started chemo - pt also said she saw her PCP last Friday - pt states she is being sen to a kidney specialist- pt states she is waiting for them to call her.  Pt says she has been sleeping well at night -pt says she has leg pain when she is walking-Pt states that the doctors know about leg pain and weakness -as well as the pain management doctor.  Pt states that she is using her walker and that her PCP did order a cane for her as requested - pt denies any falls since our last conversation.  Pt's biggest complaint is she feels that she had no energy and her legs get weak and go out - pt stated  she is very care and grabs her walker when she feels legs getting weak- Pt reports that she continues to drink a lot of water to try to flush the stone as well the infection - pt validates that she completed the antibiotics for the UTI  Pt reports that her appetite is ok and she tries to eat well- pt wanted to know if there was anything to give her energy.    Interventions:  Reviewed upcoming appt's with pt  Instructed pt to keep a notebook or pad of paper and to write down things she wants to tell the doctors as well as question she may have and bring that her to her appt next week.  Validated receipt of educational packet  Discussed being tired and no energy as one of the side effects of chemo-encouraged to discuss again with doctors    Plan:  Follow-up after next doctor visit (6-20) -   Assess for further educational opportunities as if seems there may be a new tx  Plan - validate with pt on next call  Todays [facility] Self-Management Care Plan was developed with the patients/caregivers input and was based on identified barriers from todays assessment.  Goals were written today with the patient/caregiver and the patient has agreed to work towards these goals to improve his/her overall well-being. Patient  verbalized understanding of the care plan, goals, and all of today's instructions. Encouraged patient/caregiver to communicate with his/her physician and health care team about health conditions and the treatment plan.  Provided my contact information today and encouraged patient/caregiver to call me with any questions as needed.

## 2019-06-17 ENCOUNTER — OUTPATIENT CASE MANAGEMENT (OUTPATIENT)
Dept: ADMINISTRATIVE | Facility: OTHER | Age: 69
End: 2019-06-17

## 2019-06-17 NOTE — PROGRESS NOTES
Summary:  Pt called and requested to have an appt forscheduled tests changed -because she says she is afraid of getting up on the table and falling  However in the meantime pt got a hold of the PCP's nurse and was able to reschedule her tests to next week for pt.  Pt states she saw the PCP last week and PCP is aware of swelling in L leg on the left side near the ankle, knee and thigh - as well as the pain- pt states that the PCP did order her cane and she had received the cane- pt also reports that she is taking her  fluid pil and using Aspercreme topically for the pain and also is elevating her legs.  Pt reports that her son she stays with is out of town been gone since last Thursday and coming back to night and it is hard for her to stand and and cook so she is making sandwiches- but reports that her other son has been coming by and checking on her- Pt unable to validate receipt o educational material or pill box saying  Her son had not checked the mail  Pt reports that she has an appt with a kidney specialist in August which is the earliest appt she could get - pt upset because there was nothing sooner- was planning to discuss with PCP- pt denies any S&S of UTI - but staes she is going to the [place] a lot due to fluid pill-pt denies any falls or SOB    Interventions:  Encouraged pt to continue to use her walker and her cane  Reviewed the changed upcoming test  Encouraged pt to discuss concern with late August appt with PCP  Encouraged pt to continue to drink water    Plan:  Follow-up after tests on 6-27 - call 6-28  Assess for further educational opportunities  Validate receipt o f pill edward and educational packet    Todays [facility] Self-Management Care Plan was developed with the patients/caregivers input and was based on identified barriers from todays assessment.  Goals were written today with the patient/caregiver and the patient has agreed to work towards these goals to improve his/her overall well-being. Patient  verbalized understanding of the care plan, goals, and all of today's instructions. Encouraged patient/caregiver to communicate with his/her physician and health care team about health conditions and the treatment plan.  Provided my contact information today and encouraged patient/caregiver to call me with any questions as needed.

## 2019-06-20 ENCOUNTER — TELEPHONE (OUTPATIENT)
Dept: FAMILY MEDICINE | Facility: CLINIC | Age: 69
End: 2019-06-20

## 2019-06-20 NOTE — TELEPHONE ENCOUNTER
----- Message from Lilibeth Flowers sent at 6/20/2019 10:50 AM CDT -----  No. [phone]     Patient would like to speak to the nurse about a pain she is having on her upper thigh.   Please call.

## 2019-06-20 NOTE — TELEPHONE ENCOUNTER
Called pt and she stated that she is having severe upper thigh pain. Advised pt that Dr. Hernandez currently had no appts available. Advised pt to go to the ER or Urgent Care to seek immediate care. Pt verbalized understanding.

## 2019-06-21 ENCOUNTER — PES CALL (OUTPATIENT)
Dept: ADMINISTRATIVE | Facility: CLINIC | Age: 69
End: 2019-06-21

## 2019-06-21 ENCOUNTER — OUTPATIENT CASE MANAGEMENT (OUTPATIENT)
Dept: ADMINISTRATIVE | Facility: OTHER | Age: 69
End: 2019-06-21

## 2019-06-21 NOTE — PROGRESS NOTES
Summary:  Pt called leaving a message saying  that her L leg still hurts  - OPCM RN returned called And she did not know what to do because her PCP was leaving - pt asked if she should ut ice or heat- pt stated it really wasn't pain - it was a tightness -due to swelling in her L leg - hat she had had her legs  Up like the doctors told - pt expressed concern that if she went to Urgent care they would tell her to follow-up with her PCP and her PCP was leaving. Pt  stated that after her son got off of work he was going to take her to the Urgent care- then she stated after answering phone that her other son who lives with her will be taking her to Urgent care -   Pt denies any falls or S&S of UTI says she is is till trying to drink water  - OPCM RN had difficulty hearing pt - pt stated that her phone was going out and would call back- waited 20  Min. - pt did not call back      Interventions:  Explained to pt that OPCM RN could not medically advise her what to do - instructed pt to do what she was told yesterday - if her her leg was still bothering her she needed to go to the Urgent Care or Ed  Messaged Kelsey Ramirez regarding pt needing to be established with a new   Encouraged pt to continue  drink a lot of H20   Encouraged pt to follow instructions given yesterday -by PCP and to  go to Urgent care and keep leg up when sitting if possible    Plan:  Follow-up next week on leg pain   Follow-up  call to get established with a new PCP from pt rep    [facility] Self-Management Care Plan was developed with the patients/caregivers input and was based on identified barriers from [facility] assessment.  Goals were written today with the patient/caregiver and the patient has agreed to work towards these goals to improve his/her overall well-being. Patient verbalized understanding of the care plan, goals, and all of today's instructions. Encouraged patient/caregiver to communicate with his/her physician and health care team  about health conditions and the treatment plan.  Provided my contact information today and encouraged patient/caregiver to call me with any questions as needed.

## 2019-06-26 ENCOUNTER — LAB VISIT (OUTPATIENT)
Dept: LAB | Facility: HOSPITAL | Age: 69
End: 2019-06-26
Attending: INTERNAL MEDICINE
Payer: MEDICARE

## 2019-06-26 ENCOUNTER — OFFICE VISIT (OUTPATIENT)
Dept: FAMILY MEDICINE | Facility: CLINIC | Age: 69
End: 2019-06-26
Payer: MEDICARE

## 2019-06-26 VITALS
OXYGEN SATURATION: 99 % | HEIGHT: 66 IN | SYSTOLIC BLOOD PRESSURE: 124 MMHG | DIASTOLIC BLOOD PRESSURE: 60 MMHG | WEIGHT: 181 LBS | HEART RATE: 61 BPM | BODY MASS INDEX: 29.09 KG/M2 | TEMPERATURE: 98 F

## 2019-06-26 DIAGNOSIS — R79.9 ABNORMAL FINDING OF BLOOD CHEMISTRY: ICD-10-CM

## 2019-06-26 DIAGNOSIS — R63.4 ABNORMAL WEIGHT LOSS: ICD-10-CM

## 2019-06-26 DIAGNOSIS — R26.9 ABNORMALITY OF GAIT AND MOBILITY: ICD-10-CM

## 2019-06-26 DIAGNOSIS — M79.2 NEUROPATHIC PAIN: ICD-10-CM

## 2019-06-26 DIAGNOSIS — C7B.02 METASTATIC MALIGNANT CARCINOID TUMOR TO LIVER: ICD-10-CM

## 2019-06-26 DIAGNOSIS — C7B.02 METASTATIC MALIGNANT CARCINOID TUMOR TO LIVER: Primary | ICD-10-CM

## 2019-06-26 DIAGNOSIS — M62.9 DISORDER OF MUSCLE: ICD-10-CM

## 2019-06-26 LAB
ALBUMIN SERPL BCP-MCNC: 3.5 G/DL (ref 3.5–5.2)
ALP SERPL-CCNC: 158 U/L (ref 55–135)
ALT SERPL W/O P-5'-P-CCNC: 20 U/L (ref 10–44)
ANION GAP SERPL CALC-SCNC: 10 MMOL/L (ref 8–16)
ANISOCYTOSIS BLD QL SMEAR: SLIGHT
AST SERPL-CCNC: 18 U/L (ref 10–40)
BASOPHILS # BLD AUTO: 0.03 K/UL (ref 0–0.2)
BASOPHILS NFR BLD: 0.7 % (ref 0–1.9)
BILIRUB SERPL-MCNC: 0.3 MG/DL (ref 0.1–1)
BUN SERPL-MCNC: 29 MG/DL (ref 8–23)
BURR CELLS BLD QL SMEAR: ABNORMAL
CALCIUM SERPL-MCNC: 10.1 MG/DL (ref 8.7–10.5)
CHLORIDE SERPL-SCNC: 104 MMOL/L (ref 95–110)
CHOLEST SERPL-MCNC: 122 MG/DL (ref 120–199)
CHOLEST/HDLC SERPL: 1.6 {RATIO} (ref 2–5)
CO2 SERPL-SCNC: 26 MMOL/L (ref 23–29)
CREAT SERPL-MCNC: 1.5 MG/DL (ref 0.5–1.4)
DIFFERENTIAL METHOD: ABNORMAL
EOSINOPHIL # BLD AUTO: 0.1 K/UL (ref 0–0.5)
EOSINOPHIL NFR BLD: 2.5 % (ref 0–8)
ERYTHROCYTE [DISTWIDTH] IN BLOOD BY AUTOMATED COUNT: 14.6 % (ref 11.5–14.5)
EST. GFR  (AFRICAN AMERICAN): 41.6 ML/MIN/1.73 M^2
EST. GFR  (NON AFRICAN AMERICAN): 36 ML/MIN/1.73 M^2
ESTIMATED AVG GLUCOSE: 128 MG/DL (ref 68–131)
GLUCOSE SERPL-MCNC: 107 MG/DL (ref 70–110)
HBA1C MFR BLD HPLC: 6.1 % (ref 4–5.6)
HCT VFR BLD AUTO: 32.5 % (ref 37–48.5)
HDLC SERPL-MCNC: 75 MG/DL (ref 40–75)
HDLC SERPL: 61.5 % (ref 20–50)
HGB BLD-MCNC: 9.6 G/DL (ref 12–16)
IMM GRANULOCYTES # BLD AUTO: 0.03 K/UL (ref 0–0.04)
IMM GRANULOCYTES NFR BLD AUTO: 0.7 % (ref 0–0.5)
IRON SERPL-MCNC: 29 UG/DL (ref 30–160)
LDLC SERPL CALC-MCNC: 28.2 MG/DL (ref 63–159)
LYMPHOCYTES # BLD AUTO: 1.2 K/UL (ref 1–4.8)
LYMPHOCYTES NFR BLD: 29.9 % (ref 18–48)
MAGNESIUM SERPL-MCNC: 2 MG/DL (ref 1.6–2.6)
MCH RBC QN AUTO: 26.4 PG (ref 27–31)
MCHC RBC AUTO-ENTMCNC: 29.5 G/DL (ref 32–36)
MCV RBC AUTO: 90 FL (ref 82–98)
MONOCYTES # BLD AUTO: 0.4 K/UL (ref 0.3–1)
MONOCYTES NFR BLD: 8.6 % (ref 4–15)
NEUTROPHILS # BLD AUTO: 2.4 K/UL (ref 1.8–7.7)
NEUTROPHILS NFR BLD: 57.6 % (ref 38–73)
NONHDLC SERPL-MCNC: 47 MG/DL
NRBC BLD-RTO: 0 /100 WBC
OVALOCYTES BLD QL SMEAR: ABNORMAL
PLATELET # BLD AUTO: 157 K/UL (ref 150–350)
PLATELET BLD QL SMEAR: ABNORMAL
PMV BLD AUTO: 11.6 FL (ref 9.2–12.9)
POIKILOCYTOSIS BLD QL SMEAR: SLIGHT
POTASSIUM SERPL-SCNC: 3.4 MMOL/L (ref 3.5–5.1)
PROT SERPL-MCNC: 7.8 G/DL (ref 6–8.4)
RBC # BLD AUTO: 3.63 M/UL (ref 4–5.4)
SATURATED IRON: 7 % (ref 20–50)
SODIUM SERPL-SCNC: 140 MMOL/L (ref 136–145)
T4 FREE SERPL-MCNC: 1.21 NG/DL (ref 0.71–1.51)
TOTAL IRON BINDING CAPACITY: 403 UG/DL (ref 250–450)
TRANSFERRIN SERPL-MCNC: 272 MG/DL (ref 200–375)
TRIGL SERPL-MCNC: 94 MG/DL (ref 30–150)
TSH SERPL DL<=0.005 MIU/L-ACNC: 0.62 UIU/ML (ref 0.4–4)
VIT B12 SERPL-MCNC: 241 PG/ML (ref 210–950)
WBC # BLD AUTO: 4.08 K/UL (ref 3.9–12.7)

## 2019-06-26 PROCEDURE — 99999 PR PBB SHADOW E&M-EST. PATIENT-LVL III: ICD-10-PCS | Mod: PBBFAC,,, | Performed by: INTERNAL MEDICINE

## 2019-06-26 PROCEDURE — 3078F DIAST BP <80 MM HG: CPT | Mod: CPTII,S$GLB,, | Performed by: INTERNAL MEDICINE

## 2019-06-26 PROCEDURE — 83735 ASSAY OF MAGNESIUM: CPT

## 2019-06-26 PROCEDURE — 99215 PR OFFICE/OUTPT VISIT, EST, LEVL V, 40-54 MIN: ICD-10-PCS | Mod: S$GLB,,, | Performed by: INTERNAL MEDICINE

## 2019-06-26 PROCEDURE — 84439 ASSAY OF FREE THYROXINE: CPT

## 2019-06-26 PROCEDURE — 3078F PR MOST RECENT DIASTOLIC BLOOD PRESSURE < 80 MM HG: ICD-10-PCS | Mod: CPTII,S$GLB,, | Performed by: INTERNAL MEDICINE

## 2019-06-26 PROCEDURE — 83540 ASSAY OF IRON: CPT

## 2019-06-26 PROCEDURE — 1101F PR PT FALLS ASSESS DOC 0-1 FALLS W/OUT INJ PAST YR: ICD-10-PCS | Mod: CPTII,S$GLB,, | Performed by: INTERNAL MEDICINE

## 2019-06-26 PROCEDURE — 83036 HEMOGLOBIN GLYCOSYLATED A1C: CPT

## 2019-06-26 PROCEDURE — 3074F SYST BP LT 130 MM HG: CPT | Mod: CPTII,S$GLB,, | Performed by: INTERNAL MEDICINE

## 2019-06-26 PROCEDURE — 80053 COMPREHEN METABOLIC PANEL: CPT

## 2019-06-26 PROCEDURE — 84443 ASSAY THYROID STIM HORMONE: CPT

## 2019-06-26 PROCEDURE — 99215 OFFICE O/P EST HI 40 MIN: CPT | Mod: S$GLB,,, | Performed by: INTERNAL MEDICINE

## 2019-06-26 PROCEDURE — 1101F PT FALLS ASSESS-DOCD LE1/YR: CPT | Mod: CPTII,S$GLB,, | Performed by: INTERNAL MEDICINE

## 2019-06-26 PROCEDURE — 3074F PR MOST RECENT SYSTOLIC BLOOD PRESSURE < 130 MM HG: ICD-10-PCS | Mod: CPTII,S$GLB,, | Performed by: INTERNAL MEDICINE

## 2019-06-26 PROCEDURE — 36415 COLL VENOUS BLD VENIPUNCTURE: CPT | Mod: PO

## 2019-06-26 PROCEDURE — 99999 PR PBB SHADOW E&M-EST. PATIENT-LVL III: CPT | Mod: PBBFAC,,, | Performed by: INTERNAL MEDICINE

## 2019-06-26 PROCEDURE — 82607 VITAMIN B-12: CPT

## 2019-06-26 PROCEDURE — 80061 LIPID PANEL: CPT

## 2019-06-26 PROCEDURE — 85025 COMPLETE CBC W/AUTO DIFF WBC: CPT

## 2019-06-26 RX ORDER — CAPSAICIN 0.75 MG/G
CREAM TOPICAL 4 TIMES DAILY PRN
Refills: 0
Start: 2019-06-26 | End: 2019-10-08

## 2019-06-26 RX ORDER — DIPHENOXYLATE HYDROCHLORIDE AND ATROPINE SULFATE 2.5; .025 MG/1; MG/1
TABLET ORAL
Refills: 1 | COMMUNITY
Start: 2019-06-07 | End: 2019-07-30 | Stop reason: SDUPTHER

## 2019-06-26 NOTE — PATIENT INSTRUCTIONS
Capsaicin topical cream, lotion, solution    What is this medicine?  CAPSAICIN (cap SAY sin) is a pain reliever. It is used to treat pain in muscles or joints.  How should I use this medicine?  Use this medicine on the skin. Do not take by mouth. Follow the directions on the package or prescription label. Rub into painful area until there is little or no visible medicine left on the skin surface. Wash hands with soap and water after applying. If you are using this medicine for pain in the hands, do not wash your hands for at least 30 minutes after using this medicine. After using this medicine do not use a bandage, a heating pad, or expose the affected area to direct sun. Use your medicine at regular intervals. Do not use your medicine more often than directed.  Talk to your pediatrician regarding the use of this medicine in children. Special care may be needed.  What side effects may I notice from receiving this medicine?  Side effects that you should report to your doctor or health care professional as soon as possible:  · allergic reactions like skin rash, itching or hives, swelling of the face, lips, or tongue  · burning pain, redness that does not go away  · cough  · skin sores or thinning  Side effects that usually do not require medical attention (report to your doctor or health care professional if they continue or are bothersome):  · mild stinging or warmth where used  What may interact with this medicine?  Interactions are not expected. Do not use any other skin products on the affected area without asking your doctor or health care professional.  What if I miss a dose?  If you miss a dose, use it as soon as you can. If it is almost time for your next dose, use only that dose. Do not use double or extra doses.  Where should I keep my medicine?  Keep out of the reach of young children.  Store at room temperature, between 15 and 30 degrees C (59 and 86 degrees F). Do not freeze. Protect from light. Throw  away any unused medicine after the expiration date.  What should I tell my health care provider before I take this medicine?  They need to know if you have any of these conditions:  · broken or irritated skin  · an unusual or allergic reaction to capsaicin, hot peppers, other medicines, foods, dyes, or preservatives  · pregnant or trying to get pregnant  · breast-feeding  What should I watch for while using this medicine?  Tell your doctor or healthcare professional if your symptoms do not start to get better or if they get worse.  NOTE:This sheet is a summary. It may not cover all possible information. If you have questions about this medicine, talk to your doctor, pharmacist, or health care provider. Copyright© 2017 [employer]

## 2019-06-26 NOTE — PROGRESS NOTES
Subjective:     Chief Complaint  Chief Complaint   Patient presents with    Establish Care    thigh pain     started on 06/21/2019        JENNY Domingo is a 68 y.o. female with medical diagnoses as listed in the medical history and problem list that presents for establishment of care/thigh pain.    Previously patient of Dr Hernandez      History of carcinoid tumor - treated previously by neuroendocrine  Also sees a nephrology for blood in urine/hematuria - recent CT abd shows nephrolithiasis and increase in size of the mass in abdomen     Gets cramps in fingers/thumb and first finger that come and go    Now with left anterior thight pain/discomfort that has been present since 5 days prior to presentation  Pain burning in character  Trying Aspereme    Pain Mgmt - Dr Aquilino Gerber at [facility]  Upcoming appointment 6/28   Used to take gabapentin but stopped taking due to drowsiness    Also with bilateral shoulder pain since starting chemo    Patient Care Team:  Pantera Rosen MD as PCP - General (Internal Medicine)  Stan Marques MD as Consulting Physician (Urology)  Kirsten Hoffmann MA as Care Coordinator  Kirsten Hoffmann MA as Care Coordinator  Bea Chu RN as Outpatient   Sarah Julio LMSW as Outpatient Case Management Social Worker      PAST MEDICAL HISTORY:  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)        PAST SURGICAL HISTORY:  Past Surgical History:   Procedure Laterality Date    CATARACT EXTRACTION Left 10/2017    CHOLECYSTECTOMY      CHOLECYSTECTOMY N/A 12/30/2017    [facility] at [facility]    COLON SURGERY      cystoscope      EYE SURGERY      HYSTERECTOMY  5/1996    INSERTION-INTRAOCULAR LENS (IOL) Left 10/4/2017    Performed by  Delmi Em MD at [facility] OR    LITHOTRIPSY      LIVER BIOPSY  9/14    carcinoid    PHACOEMULSIFICATION-ASPIRATION-CATARACT Left 10/4/2017    Performed by Delim mE MD at [facility] OR       SOCIAL HISTORY:  Social History     Socioeconomic History    Marital status:      Spouse name: Not on file    Number of children: Not on file    Years of education: Not on file    Highest education level: Not on file   Occupational History    Occupation: disabled    Social Needs    Financial resource strain: Not on file    Food insecurity:     Worry: Not on file     Inability: Not on file    Transportation needs:     Medical: Not on file     Non-medical: Not on file   Tobacco Use    Smoking status: Never Smoker    Smokeless tobacco: Never Used   Substance and Sexual Activity    Alcohol use: No    Drug use: No    Sexual activity: Not Currently   Lifestyle    Physical activity:     Days per week: Not on file     Minutes per session: Not on file    Stress: Not on file   Relationships    Social connections:     Talks on phone: Not on file     Gets together: Not on file     Attends Orthodoxy service: Not on file     Active member of club or organization: Not on file     Attends meetings of clubs or organizations: Not on file     Relationship status: Not on file   Other Topics Concern    Not on file   Social History Narrative    Not on file       FAMILY HISTORY:  Family History   Problem Relation Age of Onset    Cancer Mother         unknown    Alzheimer's disease Father     Stroke Sister     No Known Problems Son     No Known Problems Son     No Known Problems Son     No Known Problems Son     Kidney disease Neg Hx        ALLERGIES AND MEDICATIONS: updated and reviewed.  Review of patient's allergies indicates:   Allergen Reactions    Epinephrine Anaphylaxis     Can cause  a Carcinoid Crisis    Contrast media Hives, Itching and Swelling    Ibuprofen Hives, Itching and Swelling  "   Iodinated contrast- oral and iv dye     Sulfa (sulfonamide antibiotics) Hives, Itching and Swelling     Current Outpatient Medications   Medication Sig Dispense Refill    bumetanide (BUMEX) 0.5 MG Tab Take 1 tablet (0.5 mg total) by mouth daily as needed. 30 tablet 2    CMPD Ketamine 10%- Baclofen 2%- Cyclobenzaprine 2%- Gabapentin 2%- lidocaine 5% in lipoderm base Apply 1 Pump (1.6 g total) topically once daily. 80 g 2    escitalopram oxalate (LEXAPRO) 10 MG tablet prn  3    losartan (COZAAR) 50 MG tablet Take 1 tablet (50 mg total) by mouth once daily. 30 tablet 2    magnesium oxide (MAG-OX) 400 mg (241.3 mg magnesium) tablet Take 1 tablet (400 mg total) by mouth once daily. 30 tablet 2    metoprolol tartrate (LOPRESSOR) 50 MG tablet Take 1 tablet (50 mg total) by mouth 2 (two) times daily. 180 tablet 0    nystatin (MYCOSTATIN) 100,000 unit/mL suspension Take 6 mLs (600,000 Units total) by mouth 4 (four) times daily. 60 mL 0    oxyCODONE (ROXICODONE) 15 MG Tab TK 1 T PO  Q 4 H prn  0    promethazine (PHENERGAN) 12.5 MG Tab Take 2 tablets (25 mg total) by mouth every 6 (six) hours as needed. 30 tablet 1    diphenoxylate-atropine 2.5-0.025 mg (LOMOTIL) 2.5-0.025 mg per tablet as needed.  1     No current facility-administered medications for this visit.          ROS:  Review of Systems   Constitutional: Positive for appetite change.   Gastrointestinal: Positive for diarrhea and nausea.   Musculoskeletal: Positive for arthralgias.       Objective:       Physical Exam  Vitals:    06/26/19 1109   BP: 124/60   BP Location: Right arm   Patient Position: Sitting   BP Method: Medium (Manual)   Pulse: 61   Temp: 98.3 °F (36.8 °C)   TempSrc: Oral   SpO2: 99%   Weight: 82.1 kg (181 lb)   Height: 5' 6" (1.676 m)    Body mass index is 29.21 kg/m².  Weight: 82.1 kg (181 lb)   Height: 5' 6" (167.6 cm)   Physical Exam   Constitutional: She appears well-developed. No distress.   HENT:   Head: Normocephalic and " atraumatic.   Mouth/Throat: Oropharynx is clear and moist.   Eyes: Pupils are equal, round, and reactive to light. Conjunctivae and EOM are normal.   Neck: Normal range of motion. Neck supple. No thyromegaly present.   Cardiovascular: Normal rate, normal heart sounds and intact distal pulses.   No murmur heard.  Pulmonary/Chest: Effort normal and breath sounds normal.   Musculoskeletal: She exhibits no edema or deformity.   Lymphadenopathy:     She has no cervical adenopathy.   Neurological: She is alert. No cranial nerve deficit.   Skin: Skin is warm and dry.   Psychiatric: She has a normal mood and affect. Her behavior is normal.   Vitals reviewed.          Assessment:     1. 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 Abnormal weight loss     3. Disorder of muscle     4. Abnormal finding of blood chemistry     5. Abnormality of gait and mobility       Plan:     Patito was seen today for establish care and thigh pain.    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  Abnormal weight loss   Neuropathic pain  Following with Oncology - obtain labs as noted to evaluate current symptoms   Counseled to trial capsicin topical cream for pain of left thigh  -     CBC auto differential; Future  -     Comprehensive metabolic panel; Future  -     Magnesium; Future  -     T4, free; Future  -     TSH; Future  -     Lipid panel; Future  -     Hemoglobin A1c; Future  -     Iron and TIBC; Future  -     Vitamin B12; Future  -     T4, free; Future    Disorder of muscle   -     TSH; Future    Abnormal finding of blood chemistry   -     Lipid panel; Future  -     Hemoglobin A1c; Future    Abnormality of gait and mobility   -     Vitamin B12; Future          Health Maintenance       Date Due Completion Date    Shingles Vaccine (1 of 2) 07/27/2002 ---    Colonoscopy 07/27/2002 ---    TETANUS VACCINE 02/01/2020 (Originally 7/27/1968) ---    Pneumococcal Vaccine (65+ High/Highest Risk)  (2 of 2 - PPSV23) 02/01/2020 (Originally 12/7/2017) 10/12/2017    Influenza Vaccine 08/01/2019 10/12/2018    Override on 10/12/2018: Done    Mammogram 04/18/2020 4/18/2018    DEXA SCAN 09/19/2020 9/19/2017    Lipid Panel 04/28/2023 4/28/2018            Health Maintenance reviewed and addressed as per orders    No follow-ups on file.    The patient expressed understanding and no barriers to adherence were identified.     1. The patient indicates understanding of these issues and agrees with the plan. Brief care plan is updated and reviewed with the patient as applicable.     2. The patient is given an After Visit Summary that lists all medications with directions, allergies, orders placed during this encounter and follow-up instructions.     3. I have reviewed the patient's medical information including past medical, family, and social history sections including the medications and allergies.     4. We discussed the patient's current medications. I reconciled the patient's medication list and prepared and supplied needed refills.       Pantera Rosen MD  Internal Medicine-Pediatrics

## 2019-06-27 ENCOUNTER — HOSPITAL ENCOUNTER (OUTPATIENT)
Dept: RADIOLOGY | Facility: HOSPITAL | Age: 69
Discharge: HOME | End: 2019-06-27
Attending: SURGERY
Payer: MEDICARE

## 2019-06-27 DIAGNOSIS — C7B.8 SECONDARY NEUROENDOCRINE TUMOR OF LIVER: ICD-10-CM

## 2019-06-27 DIAGNOSIS — C7A.092 MALIGNANT CARCINOID TUMOR OF STOMACH: ICD-10-CM

## 2019-06-27 DIAGNOSIS — E53.8 LOW SERUM VITAMIN B12: Primary | ICD-10-CM

## 2019-06-27 DIAGNOSIS — D50.8 IRON DEFICIENCY ANEMIA SECONDARY TO INADEQUATE DIETARY IRON INTAKE: Primary | ICD-10-CM

## 2019-06-27 DIAGNOSIS — C7B.8 SECONDARY NEUROENDOCRINE TUMOR OF DISTANT LYMPH NODES: ICD-10-CM

## 2019-06-27 RX ORDER — FERROUS SULFATE 325(65) MG
325 TABLET ORAL
Qty: 12 TABLET | Refills: 2 | Status: SHIPPED | OUTPATIENT
Start: 2019-06-28 | End: 2019-09-19 | Stop reason: SDUPTHER

## 2019-06-27 RX ORDER — LANOLIN ALCOHOL/MO/W.PET/CERES
1000 CREAM (GRAM) TOPICAL DAILY
Qty: 30 TABLET | Refills: 5 | Status: SHIPPED | OUTPATIENT
Start: 2019-06-27 | End: 2023-09-26 | Stop reason: CLARIF

## 2019-06-28 ENCOUNTER — OUTPATIENT CASE MANAGEMENT (OUTPATIENT)
Dept: ADMINISTRATIVE | Facility: OTHER | Age: 69
End: 2019-06-28

## 2019-06-30 ENCOUNTER — EXTERNAL CHRONIC CARE MANAGEMENT (OUTPATIENT)
Dept: PRIMARY CARE CLINIC | Facility: CLINIC | Age: 69
End: 2019-06-30
Payer: MEDICARE

## 2019-06-30 PROCEDURE — 99490 PR CHRONIC CARE MGMT, 1ST 20 MIN: ICD-10-PCS | Mod: S$GLB,,, | Performed by: FAMILY MEDICINE

## 2019-06-30 PROCEDURE — 99490 CHRNC CARE MGMT STAFF 1ST 20: CPT | Mod: S$GLB,,, | Performed by: FAMILY MEDICINE

## 2019-06-30 NOTE — PROGRESS NOTES
Summary:  hospitals LMSW received referral from OPCM RN, [place], to assist with the following barriers: housing and Advance Care Planning. hospitals LMSW completed SW assessment telephonically with pt. Pt is insured by [employer] and Medicaid of [place]. Pt's PCP is Pantera Rosen MD. Pt is . Pt lives in a house that her son owns in [place], [place]. Pt reports that she lives with her son, daughter in law and 8 y/o grandson. Pt reports that she requires minimal assistance with ADLs (primarily household chores and grocery shopping). Pt reports that her son and daughter in law provides assistance when needed. Pt reports that her son and daughter in law provide transportation. LMSW discussed and provided information about the [employer] transportation benefit.  Pt reports that she would like to live alone in an apartment. LMSW will provide information regarding low-income housing in [facility].  Pt is interested in receiving information regarding the [place] [employer] and [place] [employer] Choice benefits. Pt reports that she did not serve in the  . Pt reports that she does not have a medical POA or living will. LMSW discussed and provided pt with the [facility] Advance Care Planning packet. Pt reports that she has difficulty purchasing food. Pt reports that she is not interested in receiving meals on wheels benefits at this time because she is living [place] her son's home [place]. LMSW discussed and provided information regarding the [facility] COA services (including meals on wheels). Pt is interested in receiving information about food pantries in [facility]. LMSW discussed and provided pt with information regarding food pantries. Pt also reports that she is interested in receiving a pill box; LMSW will mail. Pt reports that she does not have any addictive behaviors. PHQ score is 0. LMSW will follow up with pt on July 9, 2019. Pt is in agreement with  follow up call.     Interventions:  Chart review  Completed social work assessment, PHQ9 and plan of care  Provided LMSW contact information  Collaborated with OPCM RN   Discussed and provided the [facility] Advance Care Planning packet.   Discussed and provided information regarding low-income housing in [facility].   Discussed and provided information regarding the [facility] COA services (including meals on wheels)   Discussed and provided information regarding food pantries in [facility].   Discussed and provided pt with information regarding the [employer] transportation benefit  Mailed pill box    Plan:  Follow up with pt on July 9, 2019  Confirm receipt of mailed resources and review  Re-assess for further needs and answer any questions that might arise       Todays OPCM Self-Management Care Plan was developed with the patients/caregivers input and was based on identified barriers from todays assessment.  Goals were written today with the patient/caregiver and the patient has agreed to work towards these goals to improve his/her overall well-being. Patient verbalized understanding of the care plan, goals, and all of today's instructions. Encouraged patient/caregiver to communicate with his/her physician and health care team about health conditions and the treatment plan.  Provided my contact information today and encouraged patient/caregiver to call me with any questions as needed.

## 2019-07-01 ENCOUNTER — TELEPHONE (OUTPATIENT)
Dept: FAMILY MEDICINE | Facility: CLINIC | Age: 69
End: 2019-07-01

## 2019-07-01 ENCOUNTER — OUTPATIENT CASE MANAGEMENT (OUTPATIENT)
Dept: ADMINISTRATIVE | Facility: OTHER | Age: 69
End: 2019-07-01

## 2019-07-01 DIAGNOSIS — R11.0 NAUSEA: Primary | ICD-10-CM

## 2019-07-01 RX ORDER — PROMETHAZINE HYDROCHLORIDE 12.5 MG/1
25 TABLET ORAL EVERY 6 HOURS PRN
Qty: 30 TABLET | Refills: 2 | Status: SHIPPED | OUTPATIENT
Start: 2019-07-01 | End: 2019-09-27 | Stop reason: SDUPTHER

## 2019-07-01 NOTE — TELEPHONE ENCOUNTER
Cause of thigh pain is still unclear - we are treating her deficient iron and vitamin B12 levels so will monitor for improvement/discuss further at appointment on 7/26    Sent promethazine refill to [facility]

## 2019-07-01 NOTE — TELEPHONE ENCOUNTER
----- Message from Lauren Qureshi sent at 7/1/2019 10:59 AM CDT -----  Contact: Self   Type: Patient Call Back    Who called: Self     What is the request in detail: Pt rt a call     Can the clinic reply by [facility]? Call   Would the patient rather a call back or a response via [facility]?  Call     Best call back number: [phone]  Additional Information:

## 2019-07-01 NOTE — TELEPHONE ENCOUNTER
I have attempted without success to contact this patient by phone and LVM for patient to return call regarding lab results.

## 2019-07-01 NOTE — TELEPHONE ENCOUNTER
----- Message from Bea Chu RN sent at 7/1/2019 10:35 AM CDT -----  Contact: Vickie Chu RN OPCM  Good morning,    I just got off the phone wit the above pt on a follow-up call.   I am sending this message FYI as pt reports that her urine is still somewhat cloudy.  Please reach out to her if you have further instructions or orders for her.  Thank you for your time and attention in this matter    Best Regards,    Vickie ([place]) Kimberley MARTELL BSN RN   Outpatient Care Management  [phone]

## 2019-07-01 NOTE — PROGRESS NOTES
"Summary:  Spoke with pt regarding follow-up -pt states that her left thigh is still hurting with a burning stinging when she walks-pt states that she had a blood test at MD's office last week  And the the MD told her to get an OTC rub for her leg - pt says that she is waiting for the blood test results. Pt reports that her urine is still a little cloudy - pt stated she is no longer with palliative care-  Pt stated she had trouble last week getting "stuck" from lab test - pt says she is waiting to hear from the PCP regarding her blood test and why her leg is hurting.  Pt denies any falls or SOB    Interventions:  Messaged PCP regarding pt's urine still being cloudy  Reviewed upcoming appts  Encouraged pt to increase water intake  Reviewed S&S of UTI - instructed pt to go to urgent care if she develops fever or blood in the urine.    Plan:  Follow-up on 7-15 as agreed with pt.  Assess for further education needs post appt and MRI    Todays OPCM Self-Management Care Plan was developed with the patients/caregivers input and was based on identified barriers from todays assessment.  Goals were written today with the patient/caregiver and the patient has agreed to work towards these goals to improve his/her overall well-being. Patient verbalized understanding of the care plan, goals, and all of today's instructions. Encouraged patient/caregiver to communicate with his/her physician and health care team about health conditions and the treatment plan.  Provided my contact information today and encouraged patient/caregiver to call me with any questions as needed.  "

## 2019-07-01 NOTE — TELEPHONE ENCOUNTER
----- Message from Pantera Rosen MD sent at 6/27/2019  4:16 PM CDT -----  Please call the patient regarding results:    1) vitamin B12 is a bit low - I recommend starting 1000 mcg of vitamin B12 daily    2) Anemia is present and iron levels are mildly low - I will also send iron supplements to be taken three times WEEKLY to the pharmacy    3) Thyroid levels are normal    4) Potassium is mildly low    5) diabetes screen is borderline (prediabetic) - will recheck numbers in 6-12 months    Let me know if you have any questions or concerns.    Pantera Rosen MD  Internal Medicine-Pediatrics

## 2019-07-01 NOTE — TELEPHONE ENCOUNTER
Thank you - no further orders/tests unless urine has appearance of blood present, pain/burning with urination or symptoms of frequency/urgency (would check for possible UTI - last urine tests in May 2019)

## 2019-07-01 NOTE — TELEPHONE ENCOUNTER
Notified patient, pt verbalized understanding.     Pt says she wants to know why she has upper thigh pain says she spoke with Dr. Rosen about this at last visit and was told a blood test will tell her why.      Pt also wants refill for promethazine       Please advise    ThanksMine

## 2019-07-11 ENCOUNTER — HOSPITAL ENCOUNTER (OUTPATIENT)
Dept: RADIOLOGY | Facility: HOSPITAL | Age: 69
Discharge: HOME | End: 2019-07-11
Attending: SURGERY
Payer: MEDICARE

## 2019-07-11 ENCOUNTER — OUTPATIENT CASE MANAGEMENT (OUTPATIENT)
Dept: ADMINISTRATIVE | Facility: OTHER | Age: 69
End: 2019-07-11

## 2019-07-11 PROCEDURE — 74183 MRI ABD W/O CNTR FLWD CNTR: CPT | Mod: TC

## 2019-07-11 PROCEDURE — A9585 GADOBUTROL INJECTION: HCPCS | Performed by: SURGERY

## 2019-07-11 PROCEDURE — 25500020 PHARM REV CODE 255: Performed by: SURGERY

## 2019-07-11 PROCEDURE — 74183 MRI ABD W/O CNTR FLWD CNTR: CPT | Mod: 26,,, | Performed by: RADIOLOGY

## 2019-07-11 PROCEDURE — 74183 MRI ABDOMEN W WO CONTRAST: ICD-10-PCS | Mod: 26,,, | Performed by: RADIOLOGY

## 2019-07-11 RX ORDER — GADOBUTROL 604.72 MG/ML
10 INJECTION INTRAVENOUS
Status: COMPLETED | OUTPATIENT
Start: 2019-07-11 | End: 2019-07-11

## 2019-07-11 RX ADMIN — GADOBUTROL 10 ML: 604.72 INJECTION INTRAVENOUS at 09:07

## 2019-07-11 NOTE — PROGRESS NOTES
[x] Called and reviewed disaster plan with patient/caregiver.  Provided emergency phone number for patient's [place].   [] Attempted to reach patient/caregiver to review disaster plan.  Left a voice message with the phone number for patient's [place] Office of Emergency Preparedness.     Reviewed with Patient/Caregiver:  [x] Patient to have a supply of water, non-perishable food items, flashlights and batteries  [x] Patient to bring all medications if evacuates:  at least a five day supply preferably in the medication bottles, in case displaced for longer than 5 days  [] Patient to bring any DME needed (walkers, wheelchairs, shower chairs, nebulizer machine, etc.)   [] If Diabetic, patient to bring glucometer and monitoring supplies.   [x] If Hypertension, patient to bring blood pressure cuff  [] If CHF, patient to bring scale  [] If tube feeds, patient to bring tube feedings and feeding supplies.  [] If on oxygen,  patient to bring all oxygen supplies (Cannula, portable tanks, concentrator).  Patient will contact oxygen supply company to find out the nearest location of a supply company near evacuated location. ([employer]: [phone], [facility]: [phone], [facility]:[phone], [employer]: [phone]), [facility] -[phone].  [] If on hemodialysis, patient should already have a plan in place with dialysis center. If patient does not know where to evacuate to in order to be close to a dialysis center, patient/caregiver to reach out to regular dialysis center for further direction. ([facility]  service line: [phone], [facility]  service line [phone])  [] If receiving treatment at an infusion center, patient/caregiver to contact the infusion center to find out which infusion center they have a contract with where patient plans to evacuate.      Office of Emergency Preparedness Phone number:  [] [facility] [place]: [phone]  [] [facility] [place]: [phone]  [] [facility]: [phone]  [x] [place]: [phone]  [] [place]: [phone]  [] [place]: [phone]  [] [facility]: [phone]  [] [facility]: [phone]  [] [facility]: [phone]  [] [place]: [phone]  [] [place]: [phone]  [] [place]: [phone]  [] [place]: [phone]    [] [facility]:  [phone]   [] [facility]:  [phone]   [] [facility]:  [phone]   [] [facility]:  [phone]   [] [facility]:  [phone]   [] [facility]: [phone]   [] [facility]:  [phone]   [] [facility]:  [phone]   [] [facility]:  [phone]   [] [facility]:  [phone]   [] [facility]:  [phone]   [] [facility]:  [phone]   [] [facility]:  [phone]   [] [facility]:  [phone]   [] [facility]:  [phone]   [] [facility]: [phone]   [] [facility]:  [phone]   [] [facility]: [phone]   [] [facility]: [phone]

## 2019-07-16 ENCOUNTER — OUTPATIENT CASE MANAGEMENT (OUTPATIENT)
Dept: ADMINISTRATIVE | Facility: OTHER | Age: 69
End: 2019-07-16

## 2019-07-16 NOTE — PROGRESS NOTES
Summary:  Spoke with pt regarding follow-up  - pt says  Her legs still weak so that she has been using her walker and has been very careful -pt denies any fals - pt states that she does not know her MRI result -reports that she was told that her MRI was sent off to be read  saying that [facility] does not read them - pt stated that she received her educational packet  And pt thanked OPCM RN for sending her the OTC catalog because there was stuff in it she uses. Pt reports that she ordered $300  Pt also reports that she also received the pill box  Pt  stated that she is feeling good except for her legs -Pt stated she hope that she gets her results at her upcoming appt - states she has an appt with her pain MD ont he 26th but that physician is not with [facility]-thanked OPCM RN again for the OTC benefits catalog-deiesn any S&S of UTI    Interventions:  Reviewed upcoming appts   Praised pt for following sfaety guidelines and going slow and using walker  Encouraged pt to keep drinking H20 with heat and to possibility prevent UTI  Reviewed S&S of UTI    Plan:  Discussed call back on the 31 st of July after MD appt.    [facility] Self-Management Care Plan was developed with the patients/caregivers input and was based on identified barriers from [facility] assessment.  Goals were written today with the patient/caregiver and the patient has agreed to work towards these goals to improve his/her overall well-being. Patient verbalized understanding of the care plan, goals, and all of today's instructions. Encouraged patient/caregiver to communicate with his/her physician and health care team about health conditions and the treatment plan.  Provided my contact information today and encouraged patient/caregiver to call me with any questions as needed.

## 2019-07-22 ENCOUNTER — OUTPATIENT CASE MANAGEMENT (OUTPATIENT)
Dept: ADMINISTRATIVE | Facility: OTHER | Age: 69
End: 2019-07-22

## 2019-07-22 NOTE — PROGRESS NOTES
Summary  1st Attempt to complete Social Work follow up for Outpatient Care Management.     Intervention  Left message requesting a return call.      Plan  LMSW will reattempt in one week.

## 2019-07-30 ENCOUNTER — OFFICE VISIT (OUTPATIENT)
Dept: FAMILY MEDICINE | Facility: CLINIC | Age: 69
End: 2019-07-30
Payer: MEDICARE

## 2019-07-30 VITALS
OXYGEN SATURATION: 98 % | SYSTOLIC BLOOD PRESSURE: 124 MMHG | HEIGHT: 66 IN | TEMPERATURE: 99 F | DIASTOLIC BLOOD PRESSURE: 60 MMHG | BODY MASS INDEX: 29.92 KG/M2 | WEIGHT: 186.19 LBS | HEART RATE: 71 BPM

## 2019-07-30 DIAGNOSIS — B37.0 THRUSH: ICD-10-CM

## 2019-07-30 DIAGNOSIS — N18.3 ACUTE RENAL FAILURE SUPERIMPOSED ON STAGE 3 CHRONIC KIDNEY DISEASE, UNSPECIFIED ACUTE RENAL FAILURE TYPE: ICD-10-CM

## 2019-07-30 DIAGNOSIS — R25.2 LEG CRAMPS: ICD-10-CM

## 2019-07-30 DIAGNOSIS — K52.9 CHRONIC DIARRHEA: ICD-10-CM

## 2019-07-30 DIAGNOSIS — M25.541 PAIN INVOLVING JOINT OF FINGER OF RIGHT HAND: Primary | ICD-10-CM

## 2019-07-30 DIAGNOSIS — M25.511 PAIN IN JOINT OF RIGHT SHOULDER: ICD-10-CM

## 2019-07-30 DIAGNOSIS — C7B.02 METASTATIC MALIGNANT CARCINOID TUMOR TO LIVER: ICD-10-CM

## 2019-07-30 DIAGNOSIS — N17.9 ACUTE RENAL FAILURE SUPERIMPOSED ON STAGE 3 CHRONIC KIDNEY DISEASE, UNSPECIFIED ACUTE RENAL FAILURE TYPE: ICD-10-CM

## 2019-07-30 PROBLEM — E83.42 HYPOMAGNESEMIA: Status: RESOLVED | Noted: 2018-04-29 | Resolved: 2019-07-30

## 2019-07-30 PROBLEM — N18.30 ACUTE RENAL FAILURE SUPERIMPOSED ON STAGE 3 CHRONIC KIDNEY DISEASE: Status: ACTIVE | Noted: 2018-04-26

## 2019-07-30 PROBLEM — N18.4 ACUTE RENAL FAILURE SUPERIMPOSED ON STAGE 4 CHRONIC KIDNEY DISEASE: Status: ACTIVE | Noted: 2018-04-26

## 2019-07-30 PROBLEM — E87.6 HYPOKALEMIA: Status: RESOLVED | Noted: 2018-04-28 | Resolved: 2019-07-30

## 2019-07-30 PROCEDURE — 3074F SYST BP LT 130 MM HG: CPT | Mod: HCNC,CPTII,S$GLB, | Performed by: INTERNAL MEDICINE

## 2019-07-30 PROCEDURE — 3078F PR MOST RECENT DIASTOLIC BLOOD PRESSURE < 80 MM HG: ICD-10-PCS | Mod: HCNC,CPTII,S$GLB, | Performed by: INTERNAL MEDICINE

## 2019-07-30 PROCEDURE — 99214 OFFICE O/P EST MOD 30 MIN: CPT | Mod: HCNC,S$GLB,, | Performed by: INTERNAL MEDICINE

## 2019-07-30 PROCEDURE — 99999 PR PBB SHADOW E&M-EST. PATIENT-LVL III: CPT | Mod: PBBFAC,HCNC,, | Performed by: INTERNAL MEDICINE

## 2019-07-30 PROCEDURE — 3078F DIAST BP <80 MM HG: CPT | Mod: HCNC,CPTII,S$GLB, | Performed by: INTERNAL MEDICINE

## 2019-07-30 PROCEDURE — 3074F PR MOST RECENT SYSTOLIC BLOOD PRESSURE < 130 MM HG: ICD-10-PCS | Mod: HCNC,CPTII,S$GLB, | Performed by: INTERNAL MEDICINE

## 2019-07-30 PROCEDURE — 1101F PR PT FALLS ASSESS DOC 0-1 FALLS W/OUT INJ PAST YR: ICD-10-PCS | Mod: HCNC,CPTII,S$GLB, | Performed by: INTERNAL MEDICINE

## 2019-07-30 PROCEDURE — 99499 UNLISTED E&M SERVICE: CPT | Mod: HCNC,S$GLB,, | Performed by: INTERNAL MEDICINE

## 2019-07-30 PROCEDURE — 99499 RISK ADDL DX/OHS AUDIT: ICD-10-PCS | Mod: HCNC,S$GLB,, | Performed by: INTERNAL MEDICINE

## 2019-07-30 PROCEDURE — 99214 PR OFFICE/OUTPT VISIT, EST, LEVL IV, 30-39 MIN: ICD-10-PCS | Mod: HCNC,S$GLB,, | Performed by: INTERNAL MEDICINE

## 2019-07-30 PROCEDURE — 99999 PR PBB SHADOW E&M-EST. PATIENT-LVL III: ICD-10-PCS | Mod: PBBFAC,HCNC,, | Performed by: INTERNAL MEDICINE

## 2019-07-30 PROCEDURE — 1101F PT FALLS ASSESS-DOCD LE1/YR: CPT | Mod: HCNC,CPTII,S$GLB, | Performed by: INTERNAL MEDICINE

## 2019-07-30 RX ORDER — NYSTATIN 100000 [USP'U]/ML
6 SUSPENSION ORAL 4 TIMES DAILY
Qty: 473 ML | Refills: 5 | Status: SHIPPED | OUTPATIENT
Start: 2019-07-30 | End: 2019-08-06

## 2019-07-30 RX ORDER — DICLOFENAC SODIUM 10 MG/G
GEL TOPICAL
Qty: 100 G | Refills: 1 | Status: ON HOLD | OUTPATIENT
Start: 2019-07-30 | End: 2021-03-15

## 2019-07-30 RX ORDER — TRIAMCINOLONE ACETONIDE 0.25 MG/G
CREAM TOPICAL 2 TIMES DAILY
COMMUNITY
End: 2019-10-08

## 2019-07-30 RX ORDER — DIPHENOXYLATE HYDROCHLORIDE AND ATROPINE SULFATE 2.5; .025 MG/1; MG/1
1 TABLET ORAL 4 TIMES DAILY PRN
Qty: 30 TABLET | Refills: 5 | Status: ON HOLD | OUTPATIENT
Start: 2019-07-30 | End: 2019-10-10 | Stop reason: SDUPTHER

## 2019-07-30 NOTE — PATIENT INSTRUCTIONS
Diclofenac skin gel  What is this medicine?  DICLOFENAC (dye CORRINE vanegas ak) is a non-steroidal anti-inflammatory drug (NSAID). The 1% skin gel is used to treat osteoarthritis of the hands or knees. The 3% skin gel is used to treat actinic keratosis.    How should I use this medicine?  This medicine is for external use only. Follow the directions on the prescription label. Wash hands before and after use. Do not get this medicine in your eyes. If you do, rinse out with plenty of cool tap water. Use your doses at regular intervals. Do not use your medicine more often than directed.  A special MedGuide will be given to you by the pharmacist with each prescription and refill of the 1% gel. Be sure to read this information carefully each time.  Talk to your pediatrician regarding the use of this medicine in children. Special care may be needed. The 3% gel is not approved for use in children.    What side effects may I notice from receiving this medicine?  Side effects that you should report to your doctor or health care professional as soon as possible:  · allergic reactions like skin rash, itching or hives, swelling of the face, lips, or tongue  · black or bloody stools, blood in the urine or vomit  · blurred vision  · chest pain  · difficulty breathing or wheezing  · nausea or vomiting  · redness, blistering, peeling or loosening of the skin, including inside the mouth  · slurred speech or weakness on one side of the body  · trouble passing urine or change in the amount of urine  · unexplained weight gain or swelling  · unusually weak or tired  · yellowing of eyes or skin  Side effects that usually do not require medical attention (report to your doctor or health care professional if they continue or are bothersome):  · dizziness  · dry skin  · headache  · heartburn  · increased sensitivity to the sun  · stomach pain  · tingling at the application site  What may interact with this medicine?  · aspirin  · NSAIDs,  medicines for pain and inflammation, like ibuprofen or naproxen  Do not use any other skin products without telling your doctor or health care professional.  What if I miss a dose?  If you miss a dose, use it as soon as you can. If it is almost time for your next dose, use only that dose. Do not use double or extra doses.  Where should I keep my medicine?  Keep out of the reach of children.  Store the 1% gel at room temperature between 15 and 30 degrees C (59 and 86 degrees F). Store the 3% gel at room temperature between 20 and 25 degrees C (68 and 77 degrees F). Protect from light. Throw away any unused medicine after the expiration date.  What should I tell my health care provider before I take this medicine?  They need to know if you have any of these conditions:  · asthma  · bleeding problems  · coronary artery bypass graft (CABG) surgery within the past 2 weeks  · heart disease  · high blood pressure  · if you frequently drink alcohol containing drinks  · kidney disease  · liver disease  · open or infected skin  · stomach problems  · an unusual or allergic reaction to diclofenac, aspirin, other NSAIDs, other medicines, benzyl alcohol (3% gel only), foods, dyes, or preservatives  · pregnant or trying to get pregnant  · breast-feeding  What should I watch for while using this medicine?  Tell your doctor or healthcare professional if your symptoms do not start to get better or if they get worse. You will need to follow up with your health care provider to monitor your progress. You may need to be treated for up to 3 months if you are using the 3% gel, but the full effect may not occur until 1 month after stopping treatment. If you develop a severe skin reaction, contact your doctor or health care professional immediately.  This medicine can make you more sensitive to the sun. Keep out of the sun. If you cannot avoid being in the sun, wear protective clothing and use sunscreen. Do not use sun lamps or tanning  beds/booths.  Do not take medicines such as ibuprofen and naproxen with this medicine. Side effects such as stomach upset, nausea, or ulcers may be more likely to occur. Many medicines available without a prescription should not be taken with this medicine.  This medicine does not prevent heart attack or stroke. In fact, this medicine may increase the chance of a heart attack or stroke. The chance may increase with longer use of this medicine and in people who have heart disease. If you take aspirin to prevent heart attack or stroke, talk with your doctor or health care professional.  This medicine can cause ulcers and bleeding in the stomach and intestines at any time during treatment. Do not smoke cigarettes or drink alcohol. These increase irritation to your stomach and can make it more susceptible to damage from this medicine. Ulcers and bleeding can happen without warning symptoms and can cause death.  You may get drowsy or dizzy. Do not drive, use machinery, or do anything that needs mental alertness until you know how this medicine affects you. Do not stand or sit up quickly, especially if you are an older patient. This reduces the risk of dizzy or fainting spells.  This medicine can cause you to bleed more easily. Try to avoid damage to your teeth and gums when you brush or floss your teeth.  NOTE:This sheet is a summary. It may not cover all possible information. If you have questions about this medicine, talk to your doctor, pharmacist, or health care provider. Copyright© 2017 [employer]

## 2019-07-30 NOTE — PROGRESS NOTES
Subjective:     Chief Complaint  Chief Complaint   Patient presents with    Follow-up     burning sensation in leg        HPI  Patito Domingo is a 69 y.o. female with medical diagnoses as listed in the medical history and problem list that presents for follow-up visit.  Last clinic visit was 6/26/2019.      Taking Zostrix 0.075% topical cream for leg discomfort/burning - has improved   Using three times daily typically    Stiffness and locking of right hand index and middle finger - occasional symptoms  aspercreme helps somewhat    Recently saw nephrologist - was told she has kidney stones on both sides  She had her increase her water intake to 64 oz daily  States increased urinary frequency - just started   Her Cr was 2.5 - will repeat on August 7th    Patient Care Team:  Pantera Rosen MD as PCP - General (Internal Medicine)  Stan Marques MD as Consulting Physician (Urology)  Kirsten Hoffmann MA as Care Coordinator  Kirsten Hoffmann MA as Care Coordinator  Bea Chu RN as Outpatient   Sarah Julio LMSW as Outpatient Case Management Social Worker      PAST MEDICAL HISTORY:  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)        PAST SURGICAL HISTORY:  Past Surgical History:   Procedure Laterality Date    CATARACT EXTRACTION Left 10/2017    CHOLECYSTECTOMY      CHOLECYSTECTOMY N/A 12/30/2017    [facility]    COLON SURGERY      cystoscope      EYE SURGERY      HYSTERECTOMY  5/1996    INSERTION-INTRAOCULAR LENS (IOL) Left 10/4/2017    [facility]    LITHOTRIPSY      LIVER BIOPSY  9/14    carcinoid    PHACOEMULSIFICATION-ASPIRATION-CATARACT Left 10/4/2017    [facility]        SOCIAL HISTORY:  Social History     Socioeconomic History    Marital status:      Spouse name: Not on file    Number of children: Not on file    Years of education: Not on file    Highest education level: Not on file   Occupational History    Occupation: disabled    Social Needs    Financial resource strain: Not on file    Food insecurity:     Worry: Not on file     Inability: Not on file    Transportation needs:     Medical: Not on file     Non-medical: Not on file   Tobacco Use    Smoking status: Never Smoker    Smokeless tobacco: Never Used   Substance and Sexual Activity    Alcohol use: No    Drug use: No    Sexual activity: Not Currently   Lifestyle    Physical activity:     Days per week: Not on file     Minutes per session: Not on file    Stress: Not on file   Relationships    Social connections:     Talks on phone: Not on file     Gets together: Not on file     Attends Buddhism service: Not on file     Active member of club or organization: Not on file     Attends meetings of clubs or organizations: Not on file     Relationship status: Not on file   Other Topics Concern    Not on file   Social History Narrative    Not on file       FAMILY HISTORY:  Family History   Problem Relation Age of Onset    Cancer Mother         unknown    Alzheimer's disease Father     Stroke Sister     No Known Problems Son     No Known Problems Son     No Known Problems Son     No Known Problems Son     Kidney disease Neg Hx        ALLERGIES AND MEDICATIONS: updated and reviewed.  Review of patient's allergies indicates:   Allergen Reactions    Epinephrine Anaphylaxis     Can cause  a Carcinoid Crisis    Contrast media Hives, Itching and Swelling    Ibuprofen Hives, Itching and Swelling    Iodinated contrast- oral and iv dye     Sulfa (sulfonamide antibiotics) Hives, Itching and Swelling     Current Outpatient Medications   Medication Sig Dispense Refill    bumetanide  (BUMEX) 0.5 MG Tab Take 1 tablet (0.5 mg total) by mouth daily as needed. 30 tablet 2    capsicum 0.075% (ZOSTRIX) 0.075 % topical cream Apply topically 4 (four) times daily as needed. Apply to affected area  0    cyanocobalamin (VITAMIN B-12) 1000 MCG tablet Take 1 tablet (1,000 mcg total) by mouth once daily. 30 tablet 5    diphenoxylate-atropine 2.5-0.025 mg (LOMOTIL) 2.5-0.025 mg per tablet Take 1 tablet by mouth 4 (four) times daily as needed for Diarrhea (for episodic diarrhea). 30 tablet 5    ferrous sulfate (FEOSOL) 325 mg (65 mg iron) Tab tablet Take 1 tablet (325 mg total) by mouth 3 (three) times a week. 12 tablet 2    losartan (COZAAR) 50 MG tablet Take 1 tablet (50 mg total) by mouth once daily. 30 tablet 2    magnesium oxide (MAG-OX) 400 mg (241.3 mg magnesium) tablet Take 1 tablet (400 mg total) by mouth once daily. 30 tablet 2    metoprolol tartrate (LOPRESSOR) 50 MG tablet Take 1 tablet (50 mg total) by mouth 2 (two) times daily. 180 tablet 0    nystatin (MYCOSTATIN) 100,000 unit/mL suspension Take 6 mLs (600,000 Units total) by mouth 4 (four) times daily. Use for 7 days at at time for 7 days 473 mL 5    oxyCODONE (ROXICODONE) 15 MG Tab TK 1 T PO  Q 4 H prn  0    promethazine (PHENERGAN) 12.5 MG Tab Take 2 tablets (25 mg total) by mouth every 6 (six) hours as needed. 30 tablet 2    triamcinolone acetonide 0.025% (KENALOG) 0.025 % cream Apply topically 2 (two) times daily.      trolamine salicylate (ASPERCREME) 10 % cream Apply topically as needed.      diclofenac sodium (VOLTAREN) 1 % Gel Apply the gel (2 g) to the affected area 4 times daily. Do not apply more than 8 g daily to any one affected joint of the upper extremities. 100 g 1     No current facility-administered medications for this visit.          ROS:  Review of Systems   Constitutional: Negative for appetite change, chills, fever and unexpected weight change.   Respiratory: Negative for cough and shortness of breath.   "  Cardiovascular: Negative for chest pain, palpitations and leg swelling.   Gastrointestinal: Negative for abdominal pain, constipation, diarrhea, nausea and vomiting.   Genitourinary: Positive for frequency.   Musculoskeletal: Negative.    Skin: Negative.    Neurological: Negative for dizziness, light-headedness and headaches.   Psychiatric/Behavioral: Negative for dysphoric mood. The patient is not nervous/anxious.        Objective:       Physical Exam  Vitals:    07/30/19 1336   BP: 124/60   BP Location: Left arm   Patient Position: Sitting   BP Method: Medium (Manual)   Pulse: 71   Temp: 98.9 °F (37.2 °C)   TempSrc: Oral   SpO2: 98%   Weight: 84.5 kg (186 lb 2.9 oz)   Height: 5' 6" (1.676 m)    Body mass index is 30.05 kg/m².  Weight: 84.5 kg (186 lb 2.9 oz)   Height: 5' 6" (167.6 cm)   Physical Exam   Constitutional: She appears well-developed. No distress.   HENT:   Head: Normocephalic and atraumatic.   Mouth/Throat: Oropharynx is clear and moist.   Eyes: Pupils are equal, round, and reactive to light. Conjunctivae and EOM are normal.   Neck: Normal range of motion. Neck supple. No thyromegaly present.   Cardiovascular: Normal rate, normal heart sounds and intact distal pulses.   No murmur heard.  Pulmonary/Chest: Effort normal and breath sounds normal.   Musculoskeletal: She exhibits tenderness (2nd/3rd digit of right hand). She exhibits no edema or deformity.   Lymphadenopathy:     She has no cervical adenopathy.   Neurological: She is alert. No cranial nerve deficit.   Skin: Skin is warm and dry.   Psychiatric: She has a normal mood and affect. Her behavior is normal.   Vitals reviewed.          Assessment:     1. Pain involving joint of finger of right hand    2. Thrush    3. Pain in joint of right shoulder    4. Leg cramps    5. Chronic diarrhea    6. Acute renal failure superimposed on stage 3 chronic kidney disease, unspecified acute renal failure type    7. 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    Diagnoses and all orders for this visit:    Pain involving joint of finger of right hand  Trial topical anti-inflammatory - will send to [facility] for [facility] if necessary  -     diclofenac sodium (VOLTAREN) 1 % Gel; Apply the gel (2 g) to the affected area 4 times daily. Do not apply more than 8 g daily to any one affected joint of the upper extremities.    Thrush  Patient with episodic oral thrush - Sent medication refill to patient's preferred pharmacy on file.  -     nystatin (MYCOSTATIN) 100,000 unit/mL suspension; Take 6 mLs (600,000 Units total) by mouth 4 (four) times daily. Use for 7 days at at time for 7 days    Pain in joint of right shoulder  Leg cramps  Trial topical NSAID  -     diclofenac sodium (VOLTAREN) 1 % Gel; Apply the gel (2 g) to the affected area 4 times daily. Do not apply more than 8 g daily to any one affected joint of the upper extremities.    Chronic diarrhea  Discussed limited usage of anti-diarrheals  -     diphenoxylate-atropine 2.5-0.025 mg (LOMOTIL) 2.5-0.025 mg per tablet; Take 1 tablet by mouth 4 (four) times daily as needed for Diarrhea (for episodic diarrhea).    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  Discussed briefly - continue following with Neuroendocrine Surgery        Health Maintenance       Date Due Completion Date    Shingles Vaccine (1 of 2) 07/27/2002 ---    TETANUS VACCINE 02/01/2020 (Originally 7/27/1968) ---    Pneumococcal Vaccine (65+ High/Highest Risk) (2 of 2 - PPSV23) 02/01/2020 (Originally 12/7/2017) 10/12/2017    Influenza Vaccine 08/01/2019 10/12/2018    Override on 10/12/2018: Done    Mammogram 04/18/2020 4/18/2018    DEXA SCAN 09/19/2020 9/19/2017    Lipid Panel  06/26/2024 6/26/2019            Health Maintenance reviewed and addressed as per orders    Follow up if symptoms worsen or fail to improve.    The patient expressed understanding and no barriers to adherence were identified.     1. The patient indicates understanding of these issues and agrees with the plan. Brief care plan is updated and reviewed with the patient as applicable.     2. The patient is given an After Visit Summary that lists all medications with directions, allergies, orders placed during this encounter and follow-up instructions.     3. I have reviewed the patient's medical information including past medical, family, and social history sections including the medications and allergies.     4. We discussed the patient's current medications. I reconciled the patient's medication list and prepared and supplied needed refills.       Pantera Rosen MD  Internal Medicine-Pediatrics

## 2019-07-31 ENCOUNTER — OUTPATIENT CASE MANAGEMENT (OUTPATIENT)
Dept: ADMINISTRATIVE | Facility: OTHER | Age: 69
End: 2019-07-31

## 2019-07-31 ENCOUNTER — EXTERNAL CHRONIC CARE MANAGEMENT (OUTPATIENT)
Dept: PRIMARY CARE CLINIC | Facility: CLINIC | Age: 69
End: 2019-07-31
Payer: MEDICARE

## 2019-07-31 PROCEDURE — 99490 CHRNC CARE MGMT STAFF 1ST 20: CPT | Mod: S$GLB,,, | Performed by: FAMILY MEDICINE

## 2019-07-31 PROCEDURE — 99490 PR CHRONIC CARE MGMT, 1ST 20 MIN: ICD-10-PCS | Mod: S$GLB,,, | Performed by: FAMILY MEDICINE

## 2019-07-31 NOTE — PROGRESS NOTES
Patient, Patito Domingo (MRN #[id number]), presented with a recent Estimated Glumerular Filtration Rate (EGFR) between 15 and 29 consistent with the definition of chronic kidney disease stage 4 (ICD10 - N18.4).    eGFR if    Date Value Ref Range Status   07/29/2019 21.6 (A) >60 mL/min/1.73 m^2 Final       The patient's chronic kidney disease stage 4 was monitored, evaluated, addressed and/or treated. This addendum to the medical record is made on 07/31/2019.

## 2019-07-31 NOTE — PROGRESS NOTES
"Summary:  Spoke with pt regarding follow-up -pt stated that the doctor ordered a new cream for her legs that is supposed to help with the pain but pain said she did not remember the name  Of the new medication  - pt says that the fluid pills "dried her kidneys up"  And then she got the kidney stone - pt states that he has started drinking more supposed to drink 64 ounces a day - pt says next week she has to take a urine and blood test -pt says that the urine test is 14 days - pt states she still has the kidney stones   Pt stated that the van left her yesterday and was late coming back to get her - pt said she had gone into [employer] to get her say she had an ultra sound to confirm  Pt denies any burning or itching and denies any blood in her urine - pt says she will call [facility] RN when she has test set - up - pt was very difficult to understand -taking very fast and said her phone was acting up- pt says she tries to follow a low salt diet but often eats other peoples food and maybe they have salt in it - pt stated she needed a new phone and said it was hard for her to her - call cut short    Interventions:  Encouraged pt to larry Green instructions regarding drinking more water  Encouraged pt to call about her tests and doing them in [place]  Plan:  Follow-up with pt as agreed next week-Wed 8-7  Follow-up on tests pt is supposed t be taking  Follow-up on water intake  Todays [facility] Self-Management Care Plan was developed with the patients/caregivers input and was based on identified barriers from todays assessment.  Goals were written today with the patient/caregiver and the patient has agreed to work towards these goals to improve his/her overall well-being. Patient verbalized understanding of the care plan, goals, and all of today's instructions. Encouraged patient/caregiver to communicate with his/her physician and health care team about health conditions and the treatment plan.  Provided my contact information " today and encouraged patient/caregiver to call me with any questions as needed.

## 2019-08-02 ENCOUNTER — TELEPHONE (OUTPATIENT)
Dept: NEUROLOGY | Facility: HOSPITAL | Age: 69
End: 2019-08-02

## 2019-08-02 DIAGNOSIS — C7A.00 MALIGNANT CARCINOID TUMOR: Primary | ICD-10-CM

## 2019-08-02 DIAGNOSIS — C7B.8 SECONDARY MALIGNANT NEUROENDOCRINE TUMOR OF LIVER: ICD-10-CM

## 2019-08-02 NOTE — TELEPHONE ENCOUNTER
Called pt and discussed Tumor Board recommendations. Pt has transportation arranged via insurance. GA68 scan scheduled.

## 2019-08-09 ENCOUNTER — OUTPATIENT CASE MANAGEMENT (OUTPATIENT)
Dept: ADMINISTRATIVE | Facility: OTHER | Age: 69
End: 2019-08-09

## 2019-08-09 NOTE — PROGRESS NOTES
Summary:  Spoke with pt regarding [id number] stated that she received her [employer] OTC benefits catalog -pt states she thinks she has a kidney infection and took a urine and a blood test - pt stated that she had not finished her last antibiotics and she had not finished so she started back on them - pt stated her back by  Her kidney area was hurting and was hurting to urinate - pt states she still has her kidney stone - pt says she got bottles to do a urine test with a big orange bottle which she has turn in in 14 days - pt says antibiotic she is taking right now is nitrofluration 100 mg prescribed by Dr. Nichols - pt says she is trying to drink 4 bottles a day plus  Pt denies any falls - uses walker when she is weak - also says her thigh burning is improved -Pt denies any SOB - pt staed she is waitiing to hear regarding results from Wednesday    Interventions:  Explained how OTC benefits plan work - explained how a quarter works and that there are 3 months in each quarter.  Discussed importance of drinking lots of water  Praised pt for using walker when she feels weak    Plan:  Follow-up with pt on 8-19  Follow-up on lab tests    Todays \[facility] [place] [facility]\"" Self-Management Care Plan was developed with the patients/caregivers input and was based on identified barriers from todays assessment.  Goals were written today with the patient/caregiver and the patient has agreed to work towards these goals to improve his/her overall well-being. Patient verbalized understanding of the care plan, goals, and all of today's instructions. Encouraged patient/caregiver to communicate with his/her physician and health care team about health conditions and the treatment plan.  Provided my contact information today and encouraged patient/caregiver to call me with any questions as needed.

## 2019-08-19 ENCOUNTER — TELEPHONE (OUTPATIENT)
Dept: FAMILY MEDICINE | Facility: CLINIC | Age: 69
End: 2019-08-19

## 2019-08-19 DIAGNOSIS — B37.31 VAGINAL YEAST INFECTION: Primary | ICD-10-CM

## 2019-08-19 RX ORDER — FLUCONAZOLE 150 MG/1
150 TABLET ORAL ONCE
Qty: 1 TABLET | Refills: 0 | Status: SHIPPED | OUTPATIENT
Start: 2019-08-19 | End: 2019-08-19

## 2019-08-19 NOTE — TELEPHONE ENCOUNTER
----- Message from Leah Kiser sent at 8/19/2019  9:39 AM CDT -----  Contact: pt  Name of Who is Calling:ALEXIS GORDON [[id number]]    What is the request in detail: Patient would like a call back regarding UTI , patient would like an antibiotics for infection . Patientcasked can something be called into [facility] - [place], [place] - [facility]     Can the clinic reply by [facility]: no    What Number to Call Back if not in [facility]: [phone]

## 2019-08-19 NOTE — TELEPHONE ENCOUNTER
Spoke with patient and she stated that she had kidney stone /infection and was prescribed antiobiotics x 3-4 weeks and now she is having burning sensation and vaginal pressure. Patient needs transportation and it will be okay 3 -4 days before she can get transportation to be seen.

## 2019-08-19 NOTE — TELEPHONE ENCOUNTER
Spoke with patient and informed her that flucanozole was prescrbed for her, she needs to pick it up from the pharmacy. Please let us know in a few days how she is doing. Patient verbalized understanding.

## 2019-08-19 NOTE — TELEPHONE ENCOUNTER
Will prescribed fluconazole (1 tablet) for possible vaginal yeast infection in the setting of recent antibiotic therapy - let us know in a few days if symptoms are not improving and we can see her in clinic

## 2019-08-21 ENCOUNTER — OUTPATIENT CASE MANAGEMENT (OUTPATIENT)
Dept: ADMINISTRATIVE | Facility: OTHER | Age: 69
End: 2019-08-21

## 2019-08-21 NOTE — PROGRESS NOTES
Summary:  Spoke with pt regarding wether or not she picked up her medication yesterday- pt says she sis  medication and took it yesterday -pt says she is still doing her urine test - pt was asking about taking antibiotics -  - pt says she finished antibiotic's from former PCP - pt says that getting a new PCP is confusing to her -states that she thinks that Dr. Nichols should have told her she was leaving - pt says   It has stressed her out to have to change to a new doctor -pt says that with out [facility] she would be lost and thanked [facility]  Pt denies any falls or SOB    Interventions:  Reviewed upcoming appts  Reviewed medications with pt -- pt not on antibioitics   Allowed pt to vent frustrations   Discussed how antibiotics can cause yeast infections    Plan:  Follow up next week as discussed 8-30    Todays [facility] Self-Management Care Plan was developed with the patients/caregivers input and was based on identified barriers from todays assessment.  Goals were written today with the patient/caregiver and the patient has agreed to work towards these goals to improve his/her overall well-being. Patient verbalized understanding of the care plan, goals, and all of today's instructions. Encouraged patient/caregiver to communicate with his/her physician and health care team about health conditions and the treatment plan.  Provided my contact information today and encouraged patient/caregiver to call me with any questions as needed.

## 2019-08-22 ENCOUNTER — TELEPHONE (OUTPATIENT)
Dept: NEUROLOGY | Facility: HOSPITAL | Age: 69
End: 2019-08-22

## 2019-08-22 ENCOUNTER — HOSPITAL ENCOUNTER (EMERGENCY)
Facility: HOSPITAL | Age: 69
Discharge: HOME | End: 2019-08-23
Attending: EMERGENCY MEDICINE
Payer: MEDICARE

## 2019-08-22 ENCOUNTER — TELEPHONE (OUTPATIENT)
Dept: FAMILY MEDICINE | Facility: CLINIC | Age: 69
End: 2019-08-22

## 2019-08-22 VITALS
HEART RATE: 99 BPM | BODY MASS INDEX: 28.93 KG/M2 | SYSTOLIC BLOOD PRESSURE: 175 MMHG | DIASTOLIC BLOOD PRESSURE: 72 MMHG | HEIGHT: 66 IN | OXYGEN SATURATION: 100 % | RESPIRATION RATE: 20 BRPM | WEIGHT: 180 LBS | TEMPERATURE: 99 F

## 2019-08-22 DIAGNOSIS — I10 HYPERTENSION, UNSPECIFIED TYPE: ICD-10-CM

## 2019-08-22 DIAGNOSIS — G89.29 OTHER CHRONIC PAIN: Primary | ICD-10-CM

## 2019-08-22 DIAGNOSIS — N30.90 CYSTITIS: ICD-10-CM

## 2019-08-22 LAB
ALBUMIN SERPL BCP-MCNC: 3.5 G/DL (ref 3.5–5.2)
ALP SERPL-CCNC: 160 U/L (ref 55–135)
ALT SERPL W/O P-5'-P-CCNC: 11 U/L (ref 10–44)
ANION GAP SERPL CALC-SCNC: 13 MMOL/L (ref 8–16)
AST SERPL-CCNC: 14 U/L (ref 10–40)
BACTERIA #/AREA URNS HPF: ABNORMAL /HPF
BASOPHILS # BLD AUTO: 0.01 K/UL (ref 0–0.2)
BASOPHILS NFR BLD: 0.2 % (ref 0–1.9)
BILIRUB SERPL-MCNC: 0.5 MG/DL (ref 0.1–1)
BILIRUB UR QL STRIP: NEGATIVE
BUN SERPL-MCNC: 14 MG/DL (ref 8–23)
CALCIUM SERPL-MCNC: 10 MG/DL (ref 8.7–10.5)
CHLORIDE SERPL-SCNC: 102 MMOL/L (ref 95–110)
CLARITY UR: ABNORMAL
CO2 SERPL-SCNC: 24 MMOL/L (ref 23–29)
COLOR UR: YELLOW
CREAT SERPL-MCNC: 1.1 MG/DL (ref 0.5–1.4)
DIFFERENTIAL METHOD: ABNORMAL
EOSINOPHIL # BLD AUTO: 0 K/UL (ref 0–0.5)
EOSINOPHIL NFR BLD: 0.4 % (ref 0–8)
ERYTHROCYTE [DISTWIDTH] IN BLOOD BY AUTOMATED COUNT: 14.5 % (ref 11.5–14.5)
EST. GFR  (AFRICAN AMERICAN): >60 ML/MIN/1.73 M^2
EST. GFR  (NON AFRICAN AMERICAN): 52 ML/MIN/1.73 M^2
GLUCOSE SERPL-MCNC: 102 MG/DL (ref 70–110)
GLUCOSE UR QL STRIP: NEGATIVE
HCT VFR BLD AUTO: 33.7 % (ref 37–48.5)
HGB BLD-MCNC: 10.2 G/DL (ref 12–16)
HGB UR QL STRIP: ABNORMAL
KETONES UR QL STRIP: NEGATIVE
LEUKOCYTE ESTERASE UR QL STRIP: ABNORMAL
LYMPHOCYTES # BLD AUTO: 1.2 K/UL (ref 1–4.8)
LYMPHOCYTES NFR BLD: 21.5 % (ref 18–48)
MCH RBC QN AUTO: 26.3 PG (ref 27–31)
MCHC RBC AUTO-ENTMCNC: 30.3 G/DL (ref 32–36)
MCV RBC AUTO: 87 FL (ref 82–98)
MICROSCOPIC COMMENT: ABNORMAL
MONOCYTES # BLD AUTO: 0.5 K/UL (ref 0.3–1)
MONOCYTES NFR BLD: 8.8 % (ref 4–15)
NEUTROPHILS # BLD AUTO: 3.7 K/UL (ref 1.8–7.7)
NEUTROPHILS NFR BLD: 69.1 % (ref 38–73)
NITRITE UR QL STRIP: NEGATIVE
PH UR STRIP: 6 [PH] (ref 5–8)
PLATELET # BLD AUTO: 271 K/UL (ref 150–350)
PMV BLD AUTO: 10 FL (ref 9.2–12.9)
POTASSIUM SERPL-SCNC: 4.2 MMOL/L (ref 3.5–5.1)
PROT SERPL-MCNC: 7.8 G/DL (ref 6–8.4)
PROT UR QL STRIP: NEGATIVE
RBC # BLD AUTO: 3.88 M/UL (ref 4–5.4)
RBC #/AREA URNS HPF: 5 /HPF (ref 0–4)
SODIUM SERPL-SCNC: 139 MMOL/L (ref 136–145)
SP GR UR STRIP: >=1.03 (ref 1–1.03)
URN SPEC COLLECT METH UR: ABNORMAL
UROBILINOGEN UR STRIP-ACNC: NEGATIVE EU/DL
WBC # BLD AUTO: 5.45 K/UL (ref 3.9–12.7)
WBC #/AREA URNS HPF: 30 /HPF (ref 0–5)
WBC CLUMPS URNS QL MICRO: ABNORMAL

## 2019-08-22 PROCEDURE — 99284 EMERGENCY DEPT VISIT MOD MDM: CPT | Mod: 25,HCNC

## 2019-08-22 PROCEDURE — 63600175 PHARM REV CODE 636 W HCPCS: Mod: HCNC | Performed by: EMERGENCY MEDICINE

## 2019-08-22 PROCEDURE — 87086 URINE CULTURE/COLONY COUNT: CPT | Mod: HCNC

## 2019-08-22 PROCEDURE — 85025 COMPLETE CBC W/AUTO DIFF WBC: CPT | Mod: HCNC

## 2019-08-22 PROCEDURE — 81000 URINALYSIS NONAUTO W/SCOPE: CPT | Mod: HCNC

## 2019-08-22 PROCEDURE — 96375 TX/PRO/DX INJ NEW DRUG ADDON: CPT | Mod: HCNC

## 2019-08-22 PROCEDURE — 87186 SC STD MICRODIL/AGAR DIL: CPT | Mod: HCNC

## 2019-08-22 PROCEDURE — 80053 COMPREHEN METABOLIC PANEL: CPT | Mod: HCNC

## 2019-08-22 PROCEDURE — 87088 URINE BACTERIA CULTURE: CPT | Mod: HCNC

## 2019-08-22 PROCEDURE — 96374 THER/PROPH/DIAG INJ IV PUSH: CPT | Mod: XS,HCNC

## 2019-08-22 PROCEDURE — 87077 CULTURE AEROBIC IDENTIFY: CPT | Mod: HCNC

## 2019-08-22 PROCEDURE — 96361 HYDRATE IV INFUSION ADD-ON: CPT | Mod: XS,HCNC

## 2019-08-22 PROCEDURE — 96365 THER/PROPH/DIAG IV INF INIT: CPT | Mod: HCNC

## 2019-08-22 RX ORDER — HYDRALAZINE HYDROCHLORIDE 20 MG/ML
20 INJECTION INTRAMUSCULAR; INTRAVENOUS
Status: COMPLETED | OUTPATIENT
Start: 2019-08-22 | End: 2019-08-22

## 2019-08-22 RX ORDER — ONDANSETRON 2 MG/ML
4 INJECTION INTRAMUSCULAR; INTRAVENOUS
Status: COMPLETED | OUTPATIENT
Start: 2019-08-22 | End: 2019-08-22

## 2019-08-22 RX ORDER — HYDROMORPHONE HYDROCHLORIDE 1 MG/ML
1 INJECTION, SOLUTION INTRAMUSCULAR; INTRAVENOUS; SUBCUTANEOUS
Status: COMPLETED | OUTPATIENT
Start: 2019-08-22 | End: 2019-08-22

## 2019-08-22 RX ORDER — HYDROMORPHONE HYDROCHLORIDE 1 MG/ML
0.5 INJECTION, SOLUTION INTRAMUSCULAR; INTRAVENOUS; SUBCUTANEOUS
Status: COMPLETED | OUTPATIENT
Start: 2019-08-22 | End: 2019-08-22

## 2019-08-22 RX ORDER — PHENAZOPYRIDINE HYDROCHLORIDE 200 MG/1
200 TABLET, FILM COATED ORAL 3 TIMES DAILY
Qty: 6 TABLET | Refills: 0 | Status: SHIPPED | OUTPATIENT
Start: 2019-08-22 | End: 2019-08-24

## 2019-08-22 RX ORDER — CEPHALEXIN 500 MG/1
500 CAPSULE ORAL EVERY 8 HOURS
Qty: 21 CAPSULE | Refills: 0 | Status: SHIPPED | OUTPATIENT
Start: 2019-08-22 | End: 2019-08-29

## 2019-08-22 RX ADMIN — CEFTRIAXONE 1 G: 1 INJECTION, SOLUTION INTRAVENOUS at 11:08

## 2019-08-22 RX ADMIN — HYDROMORPHONE HYDROCHLORIDE 1 MG: 1 INJECTION, SOLUTION INTRAMUSCULAR; INTRAVENOUS; SUBCUTANEOUS at 09:08

## 2019-08-22 RX ADMIN — ONDANSETRON 4 MG: 2 INJECTION INTRAMUSCULAR; INTRAVENOUS at 07:08

## 2019-08-22 RX ADMIN — HYDROMORPHONE HYDROCHLORIDE 0.5 MG: 1 INJECTION, SOLUTION INTRAMUSCULAR; INTRAVENOUS; SUBCUTANEOUS at 07:08

## 2019-08-22 RX ADMIN — SODIUM CHLORIDE 1000 ML: 0.9 INJECTION, SOLUTION INTRAVENOUS at 07:08

## 2019-08-22 RX ADMIN — HYDRALAZINE HYDROCHLORIDE 20 MG: 20 INJECTION INTRAMUSCULAR; INTRAVENOUS at 09:08

## 2019-08-22 NOTE — TELEPHONE ENCOUNTER
----- Message from Queenie Tovar MA sent at 8/22/2019 11:16 AM CDT -----  Contact: self [phone]      ----- Message -----  From: Marilynn Anderson  Sent: 8/22/2019  11:05 AM  To: Silas Mott Staff    .Type: Patient Call Back    Who called: self    What is the request in detail: Pt is requesting a Rx for pain in vaginal area from kidney stones  .  [facility] - [facility] - [address]  [address] [facility] [address]  [place] [address]  Phone: [phone] Fax: [phone]    Can the clinic reply by [facility]? Call back    Would the patient rather a call back or a response via [facility]?  Call back     Best call back number: [phone]

## 2019-08-22 NOTE — TELEPHONE ENCOUNTER
Needs further information for assessment of pain and possible appointment - please inquire about any symptoms of vaginal discharge/bleeding as well as if any fever/chills/sweats, stool changes, urinary changes (e.g., burning, hematuria, frequency, urgency, incontinence)

## 2019-08-22 NOTE — TELEPHONE ENCOUNTER
Could we please schedule this patient for an OV? Based on her symptoms, she really should be evaluated. Thank you.

## 2019-08-22 NOTE — TELEPHONE ENCOUNTER
----- Message from Tamara Dhillon sent at 8/22/2019  9:05 AM CDT -----  Contact: self [phone]  BRIAN - Patient is not feeling good so she's calling for pain medication . Please call

## 2019-08-22 NOTE — TELEPHONE ENCOUNTER
Patient with lower abdominal pain with diarrhea times 1 week. Pain of 9 on 1-10 pain scale.  Pt also experiencing kidney stones, has contacted Dr. Verma.  Pt requesting pain medication. Pt notified request to be forwarded to Dr. Perry for approval.

## 2019-08-23 ENCOUNTER — OUTPATIENT CASE MANAGEMENT (OUTPATIENT)
Dept: ADMINISTRATIVE | Facility: OTHER | Age: 69
End: 2019-08-23

## 2019-08-23 ENCOUNTER — TELEPHONE (OUTPATIENT)
Dept: FAMILY MEDICINE | Facility: CLINIC | Age: 69
End: 2019-08-23

## 2019-08-23 DIAGNOSIS — N20.0 NEPHROLITHIASIS: Primary | ICD-10-CM

## 2019-08-23 NOTE — PROGRESS NOTES
Summary:  Spoke with  Pt regarding follow-up as pt had called yesterday afternoon stating she was going to the ED- pt says she went to the doctor yesterday - UTI infection and pt says she got an antibiotic but could not give pt prescription for pain- pt says her kidney stones are hurting had diarrhea last night - says she is weak  and not feeling good- pt then asked OPCM RN to hold because MD calling- pt said it was cancer doctor because she cancelled that appt.  Pt says she is going to try to reach her pain amangment MD today but will need to get a ride -says family is working. Pt wanted to contact PCP     Interventions:  Messaged PCP high prioity regarding pt's current status and complaints  Encouraged pt to contact PCP  Offered empathy and listened to pt's issues      Plan:  Follow-up next week on 8-30  Todays OPCM Self-Management Care Plan was developed with the patients/caregivers input and was based on identified barriers from todays assessment.  Goals were written today with the patient/caregiver and the patient has agreed to work towards these goals to improve his/her overall well-being. Patient verbalized understanding of the care plan, goals, and all of today's instructions. Encouraged patient/caregiver to communicate with his/her physician and health care team about health conditions and the treatment plan.  Provided my contact information today and encouraged patient/caregiver to call me with any questions as needed.

## 2019-08-23 NOTE — TELEPHONE ENCOUNTER
Patient should call Dr Gerber's office for refill of her opioid pain medication (unable to give opioid through primary care - she is managed through Pain Management)  May need urgent care or urgent PC appointment for follow-up in a few days   Will place urgent UROLOGY referral for significant kidney stone burden and recurrent pain

## 2019-08-23 NOTE — TELEPHONE ENCOUNTER
Patient says ED says she can only get pain med if her PCP or Pain Management provides it.     Patient says she will come in today if she have to. Says she is experiencing  a lot of pain and  Diarrhea. Says ED says its Kidney Stones. Says can she get a rx sent into the pharmacy.      Please advise..    Thanks,  Mine

## 2019-08-23 NOTE — TELEPHONE ENCOUNTER
Spoke with patient she says that Dr. Gerber's office called her after her call with the [facility], and they squeezed her onto their schedule today for 12:30.     Patient was notified that the URGENT referral was placed and the referral department will reach out to her to scheduled the appointment.    Patient also was notified if she still has any pain she may call the office for an URGENT primary care follow up.    Patient verbalized understanding.    Thanks,  Mine

## 2019-08-23 NOTE — ED NOTES
Patient educated to follow up with Dr. Perry as scheduled and pain management; pt verbalized understanding.

## 2019-08-23 NOTE — ED PROVIDER NOTES
"Encounter Date: 8/22/2019    SCRIBE #1 NOTE: I, Princess Patricia, am scribing for, and in the presence of,  Dr. Warren . I have scribed the entire note.     I, Dr. Jerry Warren MD, personally performed the services described in this documentation. All medical record entries made by the scribe were at my direction and in my presence.  I have reviewed the chart and agree that the record reflects my personal performance and is accurate and complete. Jerry Warren MD.    History     Chief Complaint   Patient presents with    Flank Pain     Pt reports pain to bilateral flanks, worse to R side. hx of renal stones. Also complaining of generalized weakness and abdominal pain. Pt of Dr. Perez. Also complaining of diarrhea.      CHIEF COMPLAINT: Patient presents with: Flank Pain      HISTORY OF PRESENT ILLNESS: Patito Domingo who is a 69 y.o. presents to the emergency department today with complaint of bilateral flank pain. Pt has a Hx of FLORECITA and was recently found to have kidney stones on 7/29/2019. She reports she was placed on antibiotics but notes the return of symptoms. Pt admits to worsening right-sided flank pain, nausea, vomiting, weakness, frequent urination, dark urine with hematuria, pain with urination and "dry lips". Pt denies any constipation, cough or CP. Pt list Dr. Robbins as her urologist. She has h/o neuroendocrine tumor and has seen Dr Perez in the past.     The history is provided by the patient.     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)      Past Surgical History:   Procedure Laterality Date    CATARACT EXTRACTION Left 10/2017    CHOLECYSTECTOMY      CHOLECYSTECTOMY N/A 12/30/2017    [facility]    COLON SURGERY      cystoscope      EYE SURGERY      HYSTERECTOMY  5/1996    INSERTION-INTRAOCULAR LENS (IOL) Left 10/4/2017    [facility] at [facility] OR    LITHOTRIPSY      LIVER BIOPSY  9/14    carcinoid    PHACOEMULSIFICATION-ASPIRATION-CATARACT Left 10/4/2017    Performed by [facility] at [facility] OR     Family History   Problem Relation Age of Onset    Cancer Mother         unknown    Alzheimer's disease Father     Stroke Sister     No Known Problems Son     No Known Problems Son     No Known Problems Son     No Known Problems Son     Kidney disease Neg Hx      Social History     Tobacco Use    Smoking status: Never Smoker    Smokeless tobacco: Never Used   Substance Use Topics    Alcohol use: No    Drug use: No     Review of Systems   Constitutional: Positive for fever. Negative for activity change, appetite change, chills, diaphoresis and fatigue.   HENT: Negative for sore throat.    Respiratory: Negative for cough, chest tightness and shortness of breath.    Cardiovascular: Negative for chest pain and palpitations.   Gastrointestinal: Positive for nausea and vomiting. Negative for abdominal distention, abdominal pain and diarrhea.   Endocrine: Negative for polydipsia and polyphagia.   Genitourinary: Positive for flank pain and frequency. Negative for difficulty urinating, dysuria, vaginal bleeding and vaginal discharge.   Musculoskeletal: Negative for arthralgias.   Skin: Negative for pallor and rash.   Neurological: Positive for weakness. Negative for dizziness and headaches.   Psychiatric/Behavioral: Negative for confusion.       Physical Exam     Initial Vitals [08/22/19 1845]   BP  Pulse Resp Temp SpO2   (!) 196/90 85 20 98.9 °F (37.2 °C) 98 %      MAP       --         Physical Exam    Nursing note and vitals reviewed.  Constitutional: She appears well-developed and well-nourished. She is not diaphoretic.   She is tearful at times but then fully conversant.    HENT:   Head: Normocephalic and atraumatic.   Right Ear: External ear normal.   Left Ear: External ear normal.   Nose: Nose normal.   Mouth/Throat: Oropharynx is clear and moist.   dry mucous membranes   Eyes: Conjunctivae and EOM are normal. Pupils are equal, round, and reactive to light. Right eye exhibits no discharge. Left eye exhibits no discharge. No scleral icterus.   Neck: Normal range of motion. Neck supple. No JVD present.   Cardiovascular: Normal rate, regular rhythm, normal heart sounds and intact distal pulses. Exam reveals no gallop and no friction rub.    No murmur heard.  Peripheral perfusion appropriate   Pulmonary/Chest: Breath sounds normal. No stridor. No respiratory distress. She has no wheezes. She exhibits no tenderness.   Abdominal: Soft. Bowel sounds are normal. She exhibits no distension and no mass. There is generalized tenderness. There is no rebound and no guarding.   midline scar to the abdomen      Musculoskeletal: She exhibits no edema or tenderness.   Back has no midline TTP.    Neurological: She is alert and oriented to person, place, and time. She has normal strength. No cranial nerve deficit.   Skin: Skin is warm and dry. Capillary refill takes less than 2 seconds. No rash noted. No pallor.   Psychiatric: Thought content normal.         ED Course   Procedures  Labs Reviewed   CBC W/ AUTO DIFFERENTIAL - Abnormal; Notable for the following components:       Result Value    RBC 3.88 (*)     Hemoglobin 10.2 (*)     Hematocrit 33.7 (*)     Mean Corpuscular Hemoglobin 26.3 (*)     Mean Corpuscular Hemoglobin Conc 30.3 (*)     All other components within normal limits   COMPREHENSIVE METABOLIC PANEL -  Abnormal; Notable for the following components:    Alkaline Phosphatase 160 (*)     eGFR if non  52 (*)     All other components within normal limits   URINALYSIS, REFLEX TO URINE CULTURE - Abnormal; Notable for the following components:    Appearance, UA Hazy (*)     Specific Gravity, UA >=1.030 (*)     Occult Blood UA 1+ (*)     Leukocytes, UA 1+ (*)     All other components within normal limits    Narrative:     Preferred Collection Type->Urine, Clean Catch   URINALYSIS MICROSCOPIC - Abnormal; Notable for the following components:    RBC, UA 5 (*)     WBC, UA 30 (*)     All other components within normal limits    Narrative:     Preferred Collection Type->Urine, Clean Catch   CULTURE, URINE          X-Rays:   Independently Interpreted Readings:   Other Readings:  Reviewed by myself, read by radiology.      CT Renal Stone Study ABD Pelvis WO   Final Result      Bilateral nonobstructing nephrolithiasis with essentially stable stone burden when compared with 05/31/2019.  No hydronephrosis or ureteral stones.      Stable appearance of mesenteric mass in the right mid abdomen and hepatic metastatic disease.      Stable postoperative and incidental findings discussed in the body of the report.         Electronically signed by: Matias Padron MD   Date:    08/22/2019   Time:    21:25         Medical Decision Making:   History:   Old Medical Records: I decided to obtain old medical records.  Initial Assessment:   Pt presents to the ED today with flank pain, history of kidney stone.  We'll obtain labs to assess kidney function, WBC, UA and likely obtain CTgiven pts pain. Treat symptoms and reassess.     Differential Diagnosis:   Differential diagnosis includes, but is not limited to:  Musculoskeletal causes, kidney stone, pyelonephritis, shingles, and intra-abdominal causes such as diverticulitis and appendicitis, aortic dissection, abdominal aortic aneurysm, colitis, PE, pneumonia.     Clinical Tests:    Lab Tests: Ordered and Reviewed  Radiological Study: Ordered and Reviewed  ED Management:  This is a 69-year-old female presents to the emergency department today with flank pain.  Fearful that her kidney stones were affecting her again.  Her workup today does not show any change in her stool stone burden.  She has home no hydronephrosis.  She has no a KI.  She has remained afebrile here in the emergency department.  While she was initially hypertensive her blood pressure has improved.  Her pain is improved following medications here in the emergency department.  At this time there is no need for admission.  I have discussed with her that she needs to follow up with Urology for further management of her kidney stones that she needs to follow up with her pain management physician for further management of her pain. She understands.  We discussed warning signs for return.  Will place her on a course of antibiotics given the fact that she has urinary symptoms and the positive leukocyte esterase. After taking into careful account the historical factors and physical exam findings of the patient's presentation today, in conjunction with the empirical and objective data obtained on ED workup, no acute emergent medical condition requiring admission has been identified. The patient appears to be low risk for an emergent medical condition and I feel it is safe and appropriate at this time for the patient to be discharged to follow-up as detailed in their discharge instructions for reevaluation and possible continued outpatient workup and management. I have discussed the specifics of the workup with the patient and the patient has verbalized understanding of the details of the workup, the diagnosis, the treatment plan, and the need for outpatient follow-up.  Although the patient has no emergent etiology today this does not preclude the development of an emergent condition so in addition, I have advised the patient that they  can return to the ED and/or activate EMS at any time with worsening of their symptoms, change of their symptoms, or with any other medical complaint.  The patient remained comfortable and stable during their visit in the ED.  Discharge and follow-up instructions discussed with the patient who expressed understanding and willingness to comply with my recommendations.     This medical record was prepared using voice dictation software. There may be phonetic errors.                      ED Course as of Aug 23 1429   Thu Aug 22, 2019   2030                2030    2145      2212    Family members report that they try to control her opioid intake as she has had problems in the past. I have discussed with them that I will treat her pain using small increments of pain medications. Obtain labs, CT scan and reassess. They understand and agree.    [JA]  Pts pain is improved following meds.   [JA]  Pt reports pain returning. Awaiting CT scan. Will give another dose of meds.   [JA]  I discussed the workup with the pt at this time no need for admission. She has stable stone burden. No ureteral stones. No hydronephrosis. I have discussed the need to follow up with urology for her kidney stones and to follow up with pain management for her chronic pain. She understands.    [JA]     0040 Called back to pts bedside by nursing. Pt is tearful reports she does not feel that she can go home. She is worried that her pain will return and what will she do at home. She lives by herself. She is sure Dr Perez will let her stay a night and registration told her with medicare she can request to stay another night. Additionally she has a procedure in the morning and would rather just stay the night instead of having to come again in the morning. I will discuss with Dr Perez's team.     [JA]   1756 I spoke with Dr Ordaz who has reviewed the pts workup. At this time they agree that the pt is appropriate for discharge to follow up with her  pain management physician.     [JA]   7161 The pt is now feeling better. I have discussed again with her that we will be discharging her home. She understands. She'd rather stay but she understands. She will follow up with her pain management physician.     [JA]      ED Course User Index  [JA] Jerry Warren MD     Clinical Impression:       ICD-10-CM ICD-9-CM   1. Other chronic pain G89.29 338.29   2. Cystitis N30.90 595.9   3. Hypertension, unspecified type I10 401.9                                Jerry Warren MD  08/23/19 7986

## 2019-08-23 NOTE — DISCHARGE INSTRUCTIONS
Additional instructions  Followup with your primary care physician in 2-3 days if you are not improving. Follow up with your pain management physician. Take all your medications as prescribed. Return to the emergency department if you have increasing pain, chest pain, difficulty breathing,  nonstop vomiting, or any other concerns. Be sure to drink plenty of fluids to stay hydrated. Get plenty of rest. Please refer to additional educational material for further instructions.

## 2019-08-23 NOTE — TELEPHONE ENCOUNTER
----- Message from Lauren Qureshi sent at 8/23/2019  9:39 AM CDT -----  Contact: Self  Type: Patient Call Back    Who called: Self     What is the request in detail: patient is rt a call     Can the clinic reply by [facility]?  Call   Would the patient rather a call back or a response via [facility]? Call   Best call back number: [phone]

## 2019-08-23 NOTE — ED TRIAGE NOTES
Patient arrived from home c/o right flank pain, states she has kidney stones and recently was on antibiotics for UTI

## 2019-08-23 NOTE — TELEPHONE ENCOUNTER
"----- Message from Bea Chu RN sent at 8/23/2019  8:44 AM CDT -----  Contact: Vickie Chu RN OCM  Good morning,    I just got off the phone with the above pt who the other day was complaining of she said "a possible urinary infection"  Pt. Says she went to the ed yesterday and was given antibiotics but was told she could not have pain medication that she would have to talk with her pain management MD.  Pt is crying and saying she is in pain says it is her kidney stones and R side hurts more than L - Pt states that she has had diarrhea all night and has not slept- says she needs help - but says her family iw working    Could you please reach out to her for further direction?  Pt says she will call someone if she needs to be seen but then canceled her cancer appt today but says she is going to try to reach her pain management doctor but wanted me to contact your office. Encouraged pt to contact pain management MD.    Your time and attention is appreciated in this matter    Best Regards,    Vickie ([place]) Kimberley MARTELL BSN RN   Outpatient Care Management  [phone]  "

## 2019-08-25 LAB — BACTERIA UR CULT: ABNORMAL

## 2019-08-26 DIAGNOSIS — I1A.0 RESISTANT HYPERTENSION: ICD-10-CM

## 2019-08-26 DIAGNOSIS — R60.0 LOWER EXTREMITY EDEMA: ICD-10-CM

## 2019-08-26 RX ORDER — BUMETANIDE 0.5 MG/1
0.5 TABLET ORAL DAILY PRN
Qty: 30 TABLET | Refills: 2 | Status: SHIPPED | OUTPATIENT
Start: 2019-08-26 | End: 2019-11-16 | Stop reason: SDUPTHER

## 2019-08-27 RX ORDER — METOPROLOL TARTRATE 50 MG/1
50 TABLET ORAL 2 TIMES DAILY
Qty: 180 TABLET | Refills: 0 | Status: SHIPPED | OUTPATIENT
Start: 2019-08-27 | End: 2019-12-04 | Stop reason: SDUPTHER

## 2019-08-31 ENCOUNTER — EXTERNAL CHRONIC CARE MANAGEMENT (OUTPATIENT)
Dept: PRIMARY CARE CLINIC | Facility: CLINIC | Age: 69
End: 2019-08-31
Payer: MEDICARE

## 2019-08-31 PROCEDURE — 99490 PR CHRONIC CARE MGMT, 1ST 20 MIN: ICD-10-PCS | Mod: S$GLB,,, | Performed by: FAMILY MEDICINE

## 2019-08-31 PROCEDURE — 99490 CHRNC CARE MGMT STAFF 1ST 20: CPT | Mod: S$GLB,,, | Performed by: FAMILY MEDICINE

## 2019-09-02 ENCOUNTER — OUTPATIENT CASE MANAGEMENT (OUTPATIENT)
Dept: ADMINISTRATIVE | Facility: OTHER | Age: 69
End: 2019-09-02

## 2019-09-02 NOTE — PROGRESS NOTES
Summary:  Spoke with pt regarding follow-up - pt it isays her back is still bothering her and hurting -says it hurts where her kidneys are at but bladder spasms are gone - pt says her pain is better but still there -pt says she is occasionally gets nauseated from the medication but uses Zofram -pt says she also gets nauseated when she gets kidney stones  Pt says she has found a new eye doctor that she is going to in 3 weeks  Pt denies any falls or SOB - pt says she still has a little swelling in there feet says it comes and goes -pt says that is not unusual for her     Interventions:  Reviewed upcoming appts  Allowed pt to vent regarding her experience at the ED  Discussed D/C after kidney appt-if no further educational opportunities identified    Plan:  Plan to discharge after kidney appt if no further educational needs identified    Todays OPCM Self-Management Care Plan was developed with the patients/caregivers input and was based on identified barriers from todays assessment.  Goals were written today with the patient/caregiver and the patient has agreed to work towards these goals to improve his/her overall well-being. Patient verbalized understanding of the care plan, goals, and all of today's instructions. Encouraged patient/caregiver to communicate with his/her physician and health care team about health conditions and the treatment plan.  Provided my contact information today and encouraged patient/caregiver to call me with any questions as needed.

## 2019-09-04 ENCOUNTER — TELEPHONE (OUTPATIENT)
Dept: UROLOGY | Facility: CLINIC | Age: 69
End: 2019-09-04

## 2019-09-04 NOTE — TELEPHONE ENCOUNTER
----- Message from Mindy Varela sent at 9/4/2019 12:16 PM CDT -----  Contact: [phone]-self  Pt is returning your call, states the insurance  never showed up to bring her to appt. Patient is requesting a callback.

## 2019-09-10 ENCOUNTER — OUTPATIENT CASE MANAGEMENT (OUTPATIENT)
Dept: ADMINISTRATIVE | Facility: OTHER | Age: 69
End: 2019-09-10

## 2019-09-10 NOTE — PROGRESS NOTES
"Summary:  Spoke with pt regarding follow-up - pt says her stomach is still bothering her - says the she is still having fluid in her feet on and off - pt says her weight  stays around 180 when she goes to the doctor but does not have a scale at home  Pt says when her feet swell she puts her feet and that she is seeing her PCP tomorrow for her fluid-pt says she still hasn't gotten her asper-cream to rub in her feet -asked pt why? Pt says first that she had not been to the store, then said there is strict pt says her son is taking her to her PCP ruling on Asper-cream per her daughter in law - then pt started taking about a lidocaine patch she can't get get cause she needs a prescription and then she said the lidocaine came in a patch and a cream.application. Could not validate what cream/ patch or what pt was using- but she doesn't not have anything right now - pt says when she can't get the patches she uses asper-cream - but does not havae either right now - unsure why - pt has been saying she was getting the Aspercream for 3 weeks but then pt did say she had asper cream for a little while but ran out last night- then pt started talking about a "can of spray" of asper-cream but she doesn't have that either - pt says her PCP knows about all of this already - pt says she used the Voltaren gell did not work for her-pt states it did not help her as mucha's the Aspercreme which she is out of -pt agreed to talk with PCP about this tomorrow-pt says all she can depend on is the cream which she first got when she got kidney stones (?)   pt says she got her wellness package from [employer] on Saturday- pt says her B/P has been running 130/80  and has been writing it down at a times.    Pt says her right side is still hurting but it has been a little bit better - says she has stones on the right side always seem worse.  Pt denies any SOB or falls- difficult to follow and get specifics    Interventions:  Discussed the importance of " "keeping urologist on the 17th   Mailed B/P logs   Discussed D/C on next call   Encouraged pt to discuss the "cream" or " patches" and pain with legs with PCP and what "cream" to use      Plan:  Follow-up  with pt after urologist appt   Assess for further educational needs - if pt needs met plan D/C    Todays OPCM Self-Management Care Plan was developed with the patients/caregivers input and was based on identified barriers from todays assessment.  Goals were written today with the patient/caregiver and the patient has agreed to work towards these goals to improve his/her overall well-being. Patient verbalized understanding of the care plan, goals, and all of today's instructions. Encouraged patient/caregiver to communicate with his/her physician and health care team about health conditions and the treatment plan.  Provided my contact information today and encouraged patient/caregiver to call me with any questions as needed.  "

## 2019-09-11 ENCOUNTER — OFFICE VISIT (OUTPATIENT)
Dept: FAMILY MEDICINE | Facility: CLINIC | Age: 69
End: 2019-09-11
Payer: MEDICARE

## 2019-09-11 ENCOUNTER — TELEPHONE (OUTPATIENT)
Dept: FAMILY MEDICINE | Facility: CLINIC | Age: 69
End: 2019-09-11

## 2019-09-11 VITALS
BODY MASS INDEX: 31.04 KG/M2 | OXYGEN SATURATION: 97 % | HEART RATE: 67 BPM | DIASTOLIC BLOOD PRESSURE: 80 MMHG | TEMPERATURE: 98 F | HEIGHT: 66 IN | SYSTOLIC BLOOD PRESSURE: 124 MMHG | WEIGHT: 193.13 LBS

## 2019-09-11 DIAGNOSIS — C7B.02 METASTATIC MALIGNANT CARCINOID TUMOR TO LIVER: ICD-10-CM

## 2019-09-11 DIAGNOSIS — R10.9 FLANK PAIN: ICD-10-CM

## 2019-09-11 DIAGNOSIS — R30.0 DYSURIA: Primary | ICD-10-CM

## 2019-09-11 DIAGNOSIS — N30.90 CYSTITIS: ICD-10-CM

## 2019-09-11 PROCEDURE — 99999 PR PBB SHADOW E&M-EST. PATIENT-LVL III: ICD-10-PCS | Mod: PBBFAC,HCNC,, | Performed by: INTERNAL MEDICINE

## 2019-09-11 PROCEDURE — 99999 PR PBB SHADOW E&M-EST. PATIENT-LVL III: CPT | Mod: PBBFAC,HCNC,, | Performed by: INTERNAL MEDICINE

## 2019-09-11 PROCEDURE — 1101F PT FALLS ASSESS-DOCD LE1/YR: CPT | Mod: HCNC,CPTII,S$GLB, | Performed by: INTERNAL MEDICINE

## 2019-09-11 PROCEDURE — 99214 OFFICE O/P EST MOD 30 MIN: CPT | Mod: HCNC,S$GLB,, | Performed by: INTERNAL MEDICINE

## 2019-09-11 PROCEDURE — 87086 URINE CULTURE/COLONY COUNT: CPT | Mod: HCNC

## 2019-09-11 PROCEDURE — 1101F PR PT FALLS ASSESS DOC 0-1 FALLS W/OUT INJ PAST YR: ICD-10-PCS | Mod: HCNC,CPTII,S$GLB, | Performed by: INTERNAL MEDICINE

## 2019-09-11 PROCEDURE — 87077 CULTURE AEROBIC IDENTIFY: CPT | Mod: HCNC

## 2019-09-11 PROCEDURE — 99214 PR OFFICE/OUTPT VISIT, EST, LEVL IV, 30-39 MIN: ICD-10-PCS | Mod: HCNC,S$GLB,, | Performed by: INTERNAL MEDICINE

## 2019-09-11 PROCEDURE — 87186 SC STD MICRODIL/AGAR DIL: CPT | Mod: HCNC

## 2019-09-11 PROCEDURE — 3079F DIAST BP 80-89 MM HG: CPT | Mod: HCNC,CPTII,S$GLB, | Performed by: INTERNAL MEDICINE

## 2019-09-11 PROCEDURE — 3079F PR MOST RECENT DIASTOLIC BLOOD PRESSURE 80-89 MM HG: ICD-10-PCS | Mod: HCNC,CPTII,S$GLB, | Performed by: INTERNAL MEDICINE

## 2019-09-11 PROCEDURE — 3074F PR MOST RECENT SYSTOLIC BLOOD PRESSURE < 130 MM HG: ICD-10-PCS | Mod: HCNC,CPTII,S$GLB, | Performed by: INTERNAL MEDICINE

## 2019-09-11 PROCEDURE — 87088 URINE BACTERIA CULTURE: CPT | Mod: HCNC

## 2019-09-11 PROCEDURE — 3074F SYST BP LT 130 MM HG: CPT | Mod: HCNC,CPTII,S$GLB, | Performed by: INTERNAL MEDICINE

## 2019-09-11 RX ORDER — PHENAZOPYRIDINE HYDROCHLORIDE 200 MG/1
200 TABLET, FILM COATED ORAL
Qty: 15 TABLET | Refills: 0 | Status: SHIPPED | OUTPATIENT
Start: 2019-09-11 | End: 2019-11-05 | Stop reason: SDUPTHER

## 2019-09-11 NOTE — PROGRESS NOTES
Subjective:       Chief Complaint  Chief Complaint   Patient presents with    Follow-up    Er follow up       HPI  Patito Domingo is a 69 y.o. female with multiple medical diagnoses as listed in the medical history and problem list that presents for follow-up visit .     Nephrolithiasis  Recent CT abdomen showed bilateral nephrolithiais  She has had ESWL and laser therapy for stones in the past   She states currently she is experiencing right sided flank pain which is persistent  To see Urology (TIANNA Devries NP) on 9/17 and Dr Robbins on 9/30    Patient Care Team:  Pantera Rosen MD as PCP - General (Internal Medicine)  Stan Marques MD as Consulting Physician (Urology)  Kirsten Hoffmann MA as Care Coordinator  Kirsten Hoffmann MA as Care Coordinator  Bea Chu RN as Outpatient   Sarah Julio LMSW as Outpatient Case Management Social Worker  Char Robbins MD as Consulting Physician (Nephrology)      PAST MEDICAL HISTORY:  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)        PAST SURGICAL HISTORY:  Past Surgical History:   Procedure Laterality Date    CATARACT EXTRACTION Left 10/2017    CHOLECYSTECTOMY      CHOLECYSTECTOMY N/A 12/30/2017    Performed by [facility] Herbert [facility] at [facility] OR    COLON SURGERY      cystoscope      EYE SURGERY      HYSTERECTOMY  5/1996    INSERTION-INTRAOCULAR LENS (IOL) Left 10/4/2017    [facility] Del[facility] Em [facility] at [facility]    LITHOTRIPSY      LIVER BIOPSY  9/14    carcinoid    PHACOEMULSIFICATION-ASPIRATION-CATARACT Left 10/4/2017    Performed by Delmi Em MD at [facility] OR       SOCIAL HISTORY:  Social History     Socioeconomic History    Marital status:      Spouse name: Not on file    Number of children: Not  on file    Years of education: Not on file    Highest education level: Not on file   Occupational History    Occupation: disabled    Social Needs    Financial resource strain: Not on file    Food insecurity:     Worry: Not on file     Inability: Not on file    Transportation needs:     Medical: Not on file     Non-medical: Not on file   Tobacco Use    Smoking status: Never Smoker    Smokeless tobacco: Never Used   Substance and Sexual Activity    Alcohol use: No    Drug use: No    Sexual activity: Not Currently   Lifestyle    Physical activity:     Days per week: Not on file     Minutes per session: Not on file    Stress: Not on file   Relationships    Social connections:     Talks on phone: Not on file     Gets together: Not on file     Attends [employer] service: Not on file     Active member of club or organization: Not on file     Attends meetings of clubs or organizations: Not on file     Relationship status: Not on file   Other Topics Concern    Not on file   Social History Narrative    Not on file       FAMILY HISTORY:  Family History   Problem Relation Age of Onset    Cancer Mother         unknown    Alzheimer's disease Father     Stroke Sister     No Known Problems Son     No Known Problems Son     No Known Problems Son     No Known Problems Son     Kidney disease Neg Hx        ALLERGIES AND MEDICATIONS: updated and reviewed.  Review of patient's allergies indicates:   Allergen Reactions    Epinephrine Anaphylaxis     Can cause  a Carcinoid Crisis    Contrast media Hives, Itching and Swelling    Ibuprofen Hives, Itching and Swelling    Iodinated contrast media     Sulfa (sulfonamide antibiotics) Hives, Itching and Swelling     Current Outpatient Medications   Medication Sig Dispense Refill    bumetanide (BUMEX) 0.5 MG Tab Take 1 tablet (0.5 mg total) by mouth daily as needed. 30 tablet 2    capsicum 0.075% (ZOSTRIX) 0.075 % topical cream Apply topically 4  (four) times daily as needed. Apply to affected area  0    cyanocobalamin (VITAMIN B-12) 1000 MCG tablet Take 1 tablet (1,000 mcg total) by mouth once daily. 30 tablet 5    diclofenac sodium (VOLTAREN) 1 % Gel Apply the gel (2 g) to the affected area 4 times daily. Do not apply more than 8 g daily to any one affected joint of the upper extremities. 100 g 1    diphenoxylate-atropine 2.5-0.025 mg (LOMOTIL) 2.5-0.025 mg per tablet Take 1 tablet by mouth 4 (four) times daily as needed for Diarrhea (for episodic diarrhea). 30 tablet 5    ferrous sulfate (FEOSOL) 325 mg (65 mg iron) Tab tablet Take 1 tablet (325 mg total) by mouth 3 (three) times a week. 12 tablet 2    losartan (COZAAR) 50 MG tablet Take 1 tablet (50 mg total) by mouth once daily. 30 tablet 2    magnesium oxide (MAG-OX) 400 mg (241.3 mg magnesium) tablet Take 1 tablet (400 mg total) by mouth once daily. 30 tablet 2    metoprolol tartrate (LOPRESSOR) 50 MG tablet Take 1 tablet (50 mg total) by mouth 2 (two) times daily. 180 tablet 0    oxyCODONE (ROXICODONE) 15 MG Tab TK 1 T PO  Q 4 H prn  0    promethazine (PHENERGAN) 12.5 MG Tab Take 2 tablets (25 mg total) by mouth every 6 (six) hours as needed. 30 tablet 2    triamcinolone acetonide 0.025% (KENALOG) 0.025 % cream Apply topically 2 (two) times daily.      trolamine salicylate (ASPERCREME) 10 % cream Apply topically as needed.       No current facility-administered medications for this visit.          ROS  Review of Systems   Constitutional: Negative for fever.   HENT:        Dry mouth   Gastrointestinal: Positive for diarrhea. Negative for nausea.   Genitourinary: Positive for flank pain (right sided), frequency and hematuria.         Objective:       Physical Exam  Vitals:    09/11/19 1146   BP: 124/80   BP Location: Left arm   Patient Position: Sitting   BP Method: Medium (Manual)   Pulse: 67   Temp: 98.3 °F (36.8 °C)   TempSrc: Oral   SpO2: 97%   Weight: 87.6 kg (193 lb 2 oz)   Height: 5'  "6" (1.676 m)    Body mass index is 31.17 kg/m².  Weight: 87.6 kg (193 lb 2 oz)   Height: 5' 6" (167.6 cm)   Physical Exam        Assessment:     1. Dysuria    2. Cystitis    3. Flank pain      Plan:     Patito was seen today for follow-up and er follow up.    Diagnoses and all orders for this visit:    Dysuria  Cystitis  Flank pain  To see Urology upcoming for follow-up   Obtain studies as noted   -     Urinalysis; Future  -     Urine culture  -     phenazopyridine (PYRIDIUM) 200 MG tablet; Take 1 tablet (200 mg total) by mouth 3 (three) times daily with meals. for 10 days  -     Urine culture    Metastatic malignant carcinoid  Patient due for scans/follow-up - with touch base with Neuroendocrine team    I spent >50% of this 40 minute encounter counseling the patient on diagnoses, risk factors, and treatment.      Health Maintenance       Date Due Completion Date    Influenza Vaccine (1) 09/01/2019 10/12/2018    TETANUS VACCINE 02/01/2020 (Originally 7/27/1968) ---    Pneumococcal Vaccine (65+ High/Highest Risk) (2 of 2 - PPSV23) 02/01/2020 (Originally 12/7/2017) 10/12/2017    Shingles Vaccine (1 of 2) 07/30/2020 (Originally 7/27/2002) ---    Mammogram 04/18/2020 4/18/2018    DEXA SCAN 09/19/2020 9/19/2017    Lipid Panel 06/26/2024 6/26/2019            Health Maintenance reviewed, addressed as per orders    Follow up if symptoms worsen or fail to improve.    The patient expressed understanding and no barriers to adherence were identified.     1. The patient indicates understanding of these issues and agrees with the plan. Brief care plan is updated and reviewed with the patient as applicable.     2. The patient is given an After Visit Summary that lists all medications with directions, allergies, orders placed during this encounter and follow-up instructions.     3. I have reviewed the patient's medical information including past medical, family, and social history sections including the medications and allergies. "     4. We discussed the patient's current medications. I reconciled the patient's medication list and prepared and supplied needed refills.       Pantera Rosen MD  Internal Medicine-Pediatrics

## 2019-09-11 NOTE — TELEPHONE ENCOUNTER
Patient is not due for a bone density scan until 9/2020. It is due every 3 years, her last one was 9/2017.

## 2019-09-11 NOTE — Clinical Note
Hi - I just saw Ms. Domingo who was inquiring about the next follow-up with the Neuroendocrine team - I know she may have some impending scans. Thanks again,Pantera Rosen

## 2019-09-11 NOTE — TELEPHONE ENCOUNTER
----- Message from Mindy Bond sent at 9/11/2019  2:25 PM CDT -----  Contact: Patient   Type: Patient Call Back    Who called: Patient     What is the request in detail: Pt is requesting a call back to discuss getting a bone density.     Can the clinic reply by [facility]? No     Would the patient rather a call back or a response via [facility]? Call back     Best call back number:[phone]

## 2019-09-13 LAB — BACTERIA UR CULT: ABNORMAL

## 2019-09-16 ENCOUNTER — PATIENT OUTREACH (OUTPATIENT)
Dept: ADMINISTRATIVE | Facility: OTHER | Age: 69
End: 2019-09-16

## 2019-09-16 ENCOUNTER — TELEPHONE (OUTPATIENT)
Dept: FAMILY MEDICINE | Facility: CLINIC | Age: 69
End: 2019-09-16

## 2019-09-16 DIAGNOSIS — N30.90 CYSTITIS: Primary | ICD-10-CM

## 2019-09-16 DIAGNOSIS — N30.90 CYSTITIS: ICD-10-CM

## 2019-09-16 RX ORDER — CEPHALEXIN 500 MG/1
500 CAPSULE ORAL EVERY 12 HOURS
Qty: 14 CAPSULE | Refills: 0 | Status: SHIPPED | OUTPATIENT
Start: 2019-09-16 | End: 2019-09-16 | Stop reason: SDUPTHER

## 2019-09-16 RX ORDER — CEPHALEXIN 500 MG/1
500 CAPSULE ORAL EVERY 12 HOURS
Qty: 14 CAPSULE | Refills: 0 | Status: SHIPPED | OUTPATIENT
Start: 2019-09-16 | End: 2019-09-23

## 2019-09-16 NOTE — TELEPHONE ENCOUNTER
Pt was advised and verbalized understanding. Pt states that she would like medication to be sent over to [facility] instead of [facility].

## 2019-09-16 NOTE — TELEPHONE ENCOUNTER
----- Message from Pantera Rosen MD sent at 9/16/2019 10:48 AM CDT -----  Please call the patient regarding results:    Urine culture shows another urinary tract infection - I am sending KEFLEX to her pharmacy ([facility] [place])    Let me know if you have any questions or concerns.

## 2019-09-16 NOTE — TELEPHONE ENCOUNTER
----- Message from Pantera Rosen MD sent at 9/16/2019 10:48 AM CDT -----  Please call the patient regarding results:    Urine culture shows another urinary tract infection - I am sending KEFLEX to her pharmacy ([facility] in [place])    Let me know if you have any questions or concerns.

## 2019-09-16 NOTE — PROGRESS NOTES
Please call the patient regarding results:    Urine culture shows another urinary tract infection - I am sending KEFLEX to her pharmacy ([facility] in [place])    Let me know if you have any questions or concerns.

## 2019-09-17 ENCOUNTER — OFFICE VISIT (OUTPATIENT)
Dept: UROLOGY | Facility: CLINIC | Age: 69
End: 2019-09-17
Payer: MEDICARE

## 2019-09-17 VITALS
DIASTOLIC BLOOD PRESSURE: 54 MMHG | TEMPERATURE: 98 F | HEIGHT: 66 IN | HEART RATE: 65 BPM | BODY MASS INDEX: 30.86 KG/M2 | WEIGHT: 192 LBS | OXYGEN SATURATION: 97 % | SYSTOLIC BLOOD PRESSURE: 158 MMHG

## 2019-09-17 DIAGNOSIS — N20.0 KIDNEY STONE: Primary | ICD-10-CM

## 2019-09-17 DIAGNOSIS — N23 RENAL COLIC ON RIGHT SIDE: ICD-10-CM

## 2019-09-17 DIAGNOSIS — N20.0 BILATERAL NEPHROLITHIASIS: Primary | ICD-10-CM

## 2019-09-17 DIAGNOSIS — N20.0 KIDNEY STONES: ICD-10-CM

## 2019-09-17 LAB
BACTERIA #/AREA URNS AUTO: ABNORMAL /HPF
BILIRUB UR QL STRIP: NEGATIVE
CLARITY UR REFRACT.AUTO: ABNORMAL
COLOR UR AUTO: YELLOW
GLUCOSE UR QL STRIP: ABNORMAL
HGB UR QL STRIP: ABNORMAL
KETONES UR QL STRIP: NEGATIVE
LEUKOCYTE ESTERASE UR QL STRIP: ABNORMAL
MICROSCOPIC COMMENT: ABNORMAL
NITRITE UR QL STRIP: POSITIVE
PH UR STRIP: 5 [PH] (ref 5–8)
PROT UR QL STRIP: NEGATIVE
RBC #/AREA URNS AUTO: 0 /HPF (ref 0–4)
SP GR UR STRIP: 1.02 (ref 1–1.03)
SQUAMOUS #/AREA URNS AUTO: 1 /HPF
URATE CRY UR QL COMP ASSIST: ABNORMAL
URN SPEC COLLECT METH UR: ABNORMAL
WBC #/AREA URNS AUTO: 24 /HPF (ref 0–5)

## 2019-09-17 PROCEDURE — 81001 URINALYSIS AUTO W/SCOPE: CPT | Mod: HCNC

## 2019-09-17 PROCEDURE — 3077F SYST BP >= 140 MM HG: CPT | Mod: HCNC,CPTII,S$GLB, | Performed by: NURSE PRACTITIONER

## 2019-09-17 PROCEDURE — 99999 PR PBB SHADOW E&M-EST. PATIENT-LVL IV: CPT | Mod: PBBFAC,HCNC,, | Performed by: NURSE PRACTITIONER

## 2019-09-17 PROCEDURE — 1101F PR PT FALLS ASSESS DOC 0-1 FALLS W/OUT INJ PAST YR: ICD-10-PCS | Mod: HCNC,CPTII,S$GLB, | Performed by: NURSE PRACTITIONER

## 2019-09-17 PROCEDURE — 99214 PR OFFICE/OUTPT VISIT, EST, LEVL IV, 30-39 MIN: ICD-10-PCS | Mod: HCNC,S$GLB,, | Performed by: NURSE PRACTITIONER

## 2019-09-17 PROCEDURE — 87077 CULTURE AEROBIC IDENTIFY: CPT | Mod: HCNC

## 2019-09-17 PROCEDURE — 99214 OFFICE O/P EST MOD 30 MIN: CPT | Mod: HCNC,S$GLB,, | Performed by: NURSE PRACTITIONER

## 2019-09-17 PROCEDURE — 87088 URINE BACTERIA CULTURE: CPT | Mod: HCNC

## 2019-09-17 PROCEDURE — 1101F PT FALLS ASSESS-DOCD LE1/YR: CPT | Mod: HCNC,CPTII,S$GLB, | Performed by: NURSE PRACTITIONER

## 2019-09-17 PROCEDURE — 3078F PR MOST RECENT DIASTOLIC BLOOD PRESSURE < 80 MM HG: ICD-10-PCS | Mod: HCNC,CPTII,S$GLB, | Performed by: NURSE PRACTITIONER

## 2019-09-17 PROCEDURE — 87086 URINE CULTURE/COLONY COUNT: CPT | Mod: HCNC

## 2019-09-17 PROCEDURE — 3078F DIAST BP <80 MM HG: CPT | Mod: HCNC,CPTII,S$GLB, | Performed by: NURSE PRACTITIONER

## 2019-09-17 PROCEDURE — 99999 PR PBB SHADOW E&M-EST. PATIENT-LVL IV: ICD-10-PCS | Mod: PBBFAC,HCNC,, | Performed by: NURSE PRACTITIONER

## 2019-09-17 PROCEDURE — 3077F PR MOST RECENT SYSTOLIC BLOOD PRESSURE >= 140 MM HG: ICD-10-PCS | Mod: HCNC,CPTII,S$GLB, | Performed by: NURSE PRACTITIONER

## 2019-09-17 PROCEDURE — 87186 SC STD MICRODIL/AGAR DIL: CPT | Mod: HCNC

## 2019-09-17 RX ORDER — SODIUM CHLORIDE 9 MG/ML
INJECTION, SOLUTION INTRAVENOUS CONTINUOUS
Status: CANCELLED | OUTPATIENT
Start: 2019-09-17

## 2019-09-17 RX ORDER — NYSTATIN 100000 [USP'U]/ML
SUSPENSION ORAL
Refills: 5 | COMMUNITY
Start: 2019-09-03 | End: 2019-10-08

## 2019-09-17 NOTE — LETTER
September 17, 2019      Pantera Rosen MD  [address]  [place] [address]           [place] [facility]  [address] [place] [address], [address]  [place] [address]  Phone: [phone]  Fax: [phone]          Patient: Patito Domingo   MR Number: [id number]   YOB: 1950   Date of Visit: 9/17/2019       Dear Dr. Pantera Rosen:    Thank you for referring Patito Domingo to me for evaluation. Attached you will find relevant portions of my assessment and plan of care.    If you have questions, please do not hesitate to call me. I look forward to following Patito Domingo along with you.    Sincerely,    Lara Devries, KINSEY    Enclosure  CC:  No Recipients    If you would like to receive this communication electronically, please contact [email or web address] or [phone] to request more information on [facility] access.    For providers and/or their staff who would like to refer a patient to [facility], please contact us through our one-stop-shop provider referral line, [facility], at [phone].    If you feel you have received this communication in error or would no longer like to receive these types of communications, please e-mail [email or web address]

## 2019-09-17 NOTE — PROGRESS NOTES
Subjective:       Patient ID: Patito Domingo is a 69 y.o. female.    Chief Complaint: Nephrolithiasis    Patient is new to me. She is a 70 yo AAF who is for evaluation of bilateral nephrolithiasis. CT was performed on 8/22/2019. Results discussed with patient. Patient reports a history of kidney stones. Patient reports she was recently diagnosed with UTI on yesterday and prescribed Keflex by PCP. She has not started antibiotic yet.   Flank Pain   This is a chronic problem. Episode onset: 3 months. The problem occurs daily. The problem has been gradually worsening since onset. The pain is present in the costovertebral angle (right). The quality of the pain is described as stabbing and shooting. The pain does not radiate. The pain is at a severity of 10/10. The pain is severe. Associated symptoms include dysuria, a fever and weakness. Pertinent negatives include no abdominal pain, bladder incontinence, bowel incontinence or headaches.     Review of Systems   Constitutional: Positive for chills, fatigue and fever. Negative for appetite change.   Gastrointestinal: Positive for diarrhea and nausea. Negative for abdominal pain, bowel incontinence, constipation and vomiting.   Genitourinary: Positive for dysuria, flank pain (right), frequency, hematuria and urgency. Negative for bladder incontinence, decreased urine volume, difficulty urinating, vaginal bleeding, vaginal discharge and vaginal pain.   Neurological: Positive for weakness. Negative for dizziness and headaches.   Psychiatric/Behavioral: Negative.        Objective:      Physical Exam   Constitutional: She is oriented to person, place, and time. No distress.   Obese   HENT:   Head: Normocephalic and atraumatic.   Eyes: Pupils are equal, round, and reactive to light. EOM are normal.   Neck: Normal range of motion.   Cardiovascular: Normal rate.   Pulmonary/Chest: Effort normal. No respiratory distress.   Abdominal: Soft. There is no tenderness.    Musculoskeletal: Normal range of motion. She exhibits no edema.   Right CVAT present.  Ambulates with a wheeled walker.    Neurological: She is alert and oriented to person, place, and time. Coordination normal.   Skin: Skin is warm and dry.   Psychiatric: She has a normal mood and affect. Her behavior is normal. Judgment and thought content normal.   Nursing note and vitals reviewed.      Assessment:       1. Bilateral nephrolithiasis    2. Renal colic on right side        Plan:       Violet was seen today for nephrolithiasis.    Diagnoses and all orders for this visit:    Bilateral nephrolithiasis  -     POCT URINE DIPSTICK WITHOUT MICROSCOPE    Renal colic on right side  -     POCT URINE DIPSTICK WITHOUT MICROSCOPE    Other orders  1. Schedule right ureteroscopy with laser litho and stone extraction with Dr. Isbell for Thursday, 9/26/19.   2. Patient will need left renal ureterscopy in the future.   3. Start taking Keflex for UTI.  4. Continue taking Pyridium for bladder discomfort.  5. Continue taking oxycodone as needed for pain.   6. Consent discussed and signed by patient.     Follow-up post-op    Lara Devries NP

## 2019-09-17 NOTE — PATIENT INSTRUCTIONS
1. Schedule right ureteroscopy with laser litho and stone extraction with Dr. Isbell for Thursday, 9/26/19.   2. Patient will need left renal ureterscopy in the future.   3. Urine dipstick  4. Start taking Keflex for UTI.  5. Continue taking Pyridium for bladder discomfort.  6. Continue taking oxycodone as needed for pain.   7. Follow-up post-op

## 2019-09-19 ENCOUNTER — TELEPHONE (OUTPATIENT)
Dept: UROLOGY | Facility: CLINIC | Age: 69
End: 2019-09-19

## 2019-09-19 DIAGNOSIS — D50.8 IRON DEFICIENCY ANEMIA SECONDARY TO INADEQUATE DIETARY IRON INTAKE: ICD-10-CM

## 2019-09-19 DIAGNOSIS — N20.0 KIDNEY STONE: Primary | ICD-10-CM

## 2019-09-19 RX ORDER — FERROUS SULFATE 325(65) MG
TABLET ORAL
Qty: 12 TABLET | Refills: 2 | Status: ON HOLD | OUTPATIENT
Start: 2019-09-19 | End: 2021-03-15

## 2019-09-19 NOTE — TELEPHONE ENCOUNTER
----- Message from Amy Perkins sent at 9/19/2019  9:43 AM CDT -----  Contact: Self [phone]  Patient is calling to reschedule her procedure.

## 2019-09-23 ENCOUNTER — TELEPHONE (OUTPATIENT)
Dept: UROLOGY | Facility: CLINIC | Age: 69
End: 2019-09-23

## 2019-09-23 DIAGNOSIS — N30.01 ACUTE CYSTITIS WITH HEMATURIA: Primary | ICD-10-CM

## 2019-09-23 LAB — BACTERIA UR CULT: ABNORMAL

## 2019-09-23 RX ORDER — NITROFURANTOIN 25; 75 MG/1; MG/1
100 CAPSULE ORAL 2 TIMES DAILY
Qty: 20 CAPSULE | Refills: 0 | Status: SHIPPED | OUTPATIENT
Start: 2019-09-23 | End: 2019-10-03

## 2019-09-23 NOTE — TELEPHONE ENCOUNTER
----- Message from Lara Devries NP sent at 9/23/2019  8:52 AM CDT -----  Please inform patient she has a UTI. [facility]. Repeat urine cx after completion of antibiotic therapy.

## 2019-09-26 ENCOUNTER — OUTPATIENT CASE MANAGEMENT (OUTPATIENT)
Dept: ADMINISTRATIVE | Facility: OTHER | Age: 69
End: 2019-09-26

## 2019-09-26 NOTE — PROGRESS NOTES
Summary:  Spoke with pt regarding follow-up -pt has a UTI again-pt drinking water she says - pt also reports she is taking her antibiotics - pt says her frequency and burning has decreased -pt says that she got another UTI because the bacteria was not sensitive to the other antibiotic   Pt says she went to the eye doctor today and is getting new eye glasses  Pt says her B/P has been OK - says it was 120/54 and she is taking it daily  Pt denies any falls or SOB    Interventions:  Instructed pt to continue drinking water -likes her water cold  Praised pt to taking her B/P and recording daily  Discussed D/C on next call if no S&S of UTI  Reveiwed upcoming lab appts    Plan:  Discussed follow-up in the upcoming week - discussed D/C if all ok  Todays OPCM Self-Management Care Plan was developed with the patients/caregivers input and was based on identified barriers from todays assessment.  Goals were written today with the patient/caregiver and the patient has agreed to work towards these goals to improve his/her overall well-being. Patient verbalized understanding of the care plan, goals, and all of today's instructions. Encouraged patient/caregiver to communicate with his/her physician and health care team about health conditions and the treatment plan.  Provided my contact information today and encouraged patient/caregiver to call me with any questions as needed.

## 2019-09-27 ENCOUNTER — TELEPHONE (OUTPATIENT)
Dept: UROLOGY | Facility: CLINIC | Age: 69
End: 2019-09-27

## 2019-09-27 DIAGNOSIS — R11.0 NAUSEA: ICD-10-CM

## 2019-09-27 RX ORDER — PROMETHAZINE HYDROCHLORIDE 12.5 MG/1
25 TABLET ORAL EVERY 6 HOURS PRN
Qty: 30 TABLET | Refills: 2 | Status: ON HOLD | OUTPATIENT
Start: 2019-09-27 | End: 2020-01-14

## 2019-09-27 NOTE — TELEPHONE ENCOUNTER
----- Message from Pat Khoury sent at 9/27/2019  1:24 PM CDT -----  Contact: Pt/[phone]  Patient is requesting a call a back in regards to her Surgery.    Please call.    Phone [phone].    Thank you.

## 2019-09-30 ENCOUNTER — EXTERNAL CHRONIC CARE MANAGEMENT (OUTPATIENT)
Dept: PRIMARY CARE CLINIC | Facility: CLINIC | Age: 69
End: 2019-09-30
Payer: MEDICARE

## 2019-09-30 DIAGNOSIS — I1A.0 RESISTANT HYPERTENSION: Chronic | ICD-10-CM

## 2019-09-30 PROCEDURE — 99490 PR CHRONIC CARE MGMT, 1ST 20 MIN: ICD-10-PCS | Mod: S$GLB,,, | Performed by: FAMILY MEDICINE

## 2019-09-30 PROCEDURE — 99490 CHRNC CARE MGMT STAFF 1ST 20: CPT | Mod: S$GLB,,, | Performed by: FAMILY MEDICINE

## 2019-09-30 RX ORDER — LOSARTAN POTASSIUM 50 MG/1
50 TABLET ORAL DAILY
Qty: 30 TABLET | Refills: 2 | Status: SHIPPED | OUTPATIENT
Start: 2019-09-30 | End: 2019-12-30

## 2019-09-30 NOTE — PROGRESS NOTES
Summary:  OPCM LMSW followed up with pt. Pt reports that she is currently being treated for an UTI. Pt reviewed previously mailed information with pt; pt verbalized understanding. LMSW informed pt that the [employer] (housing authority [facility]) list is currently open and asked permission to add her name to the list; pt declined stating that she is not interested in living in [place]. Pt is declining all meal delivery programs because she reports that she does not want any delivery service coming to her son's home.     Interventions:  Collaborated with OPCM RN   Reviewed previously mailed information  Provided LMSW contact information  Case Closure     Plan:  Close case at this time.      Todays OPC Self-Management Care Plan was developed with the patients/caregivers input and was based on identified barriers from todays assessment.  Goals were written today with the patient/caregiver and the patient has agreed to work towards these goals to improve his/her overall well-being. Patient verbalized understanding of the care plan, goals, and all of today's instructions. Encouraged patient/caregiver to communicate with his/her physician and health care team about health conditions and the treatment plan.  Provided my contact information today and encouraged patient/caregiver to call me with any questions as needed.

## 2019-10-03 ENCOUNTER — OUTPATIENT CASE MANAGEMENT (OUTPATIENT)
Dept: ADMINISTRATIVE | Facility: OTHER | Age: 69
End: 2019-10-03

## 2019-10-03 NOTE — PROGRESS NOTES
Summary:  Spoke with  Pt regarding follow-up- pt says she was at [facility] getting her medications and her Aspercreme rub.  Pt says that she is feeling better and has no more S&S of a UTI saying that the new antibiotics worked-Pt says her B/P today was 109/85  Py says she is prepared for her procedure next week  Pt reports that her appetite is better - Pt denies any falls or SOB - Pt thanked OPCM RN for calls and appreciated the follow-up    Interventions:  Reviewed upcoming labs and appts  Discussed D/C as previously discussed on last call - pt agreed  Instructed t to keep up the good work and taking care of herself - reminded her to keep drinking water to possibility prevent another UTI    Plan:  Coals met - case closed  Todays OPCM Self-Management Care Plan was developed with the patients/caregivers input and was based on identified barriers from todays assessment.  Goals were written today with the patient/caregiver and the patient has agreed to work towards these goals to improve his/her overall well-being. Patient verbalized understanding of the care plan, goals, and all of today's instructions. Encouraged patient/caregiver to communicate with his/her physician and health care team about health conditions and the treatment plan.  Provided my contact information today and encouraged patient/caregiver to call me with any questions as needed.

## 2019-10-09 RX ORDER — GABAPENTIN 100 MG/1
CAPSULE ORAL
Qty: 120 CAPSULE | Refills: 0 | Status: ON HOLD | OUTPATIENT
Start: 2019-10-09 | End: 2019-10-10

## 2019-10-10 DIAGNOSIS — K52.9 CHRONIC DIARRHEA: ICD-10-CM

## 2019-10-10 PROBLEM — N20.0 KIDNEY STONES: Status: ACTIVE | Noted: 2019-10-10

## 2019-10-10 PROBLEM — R10.31 RIGHT LOWER QUADRANT PAIN: Status: ACTIVE | Noted: 2019-10-10

## 2019-10-10 RX ORDER — DIPHENOXYLATE HYDROCHLORIDE AND ATROPINE SULFATE 2.5; .025 MG/1; MG/1
1 TABLET ORAL 4 TIMES DAILY PRN
Qty: 30 TABLET | Refills: 0 | Status: SHIPPED | OUTPATIENT
Start: 2019-10-10 | End: 2020-01-16 | Stop reason: SDUPTHER

## 2019-10-11 RX ORDER — CEPHALEXIN 500 MG/1
500 CAPSULE ORAL 4 TIMES DAILY
Qty: 40 CAPSULE | Refills: 1 | Status: SHIPPED | OUTPATIENT
Start: 2019-10-11 | End: 2019-10-31 | Stop reason: SDUPTHER

## 2019-10-15 ENCOUNTER — NURSE TRIAGE (OUTPATIENT)
Dept: ADMINISTRATIVE | Facility: CLINIC | Age: 69
End: 2019-10-15

## 2019-10-15 DIAGNOSIS — R11.0 NAUSEA: ICD-10-CM

## 2019-10-15 RX ORDER — PROMETHAZINE HYDROCHLORIDE 12.5 MG/1
25 TABLET ORAL EVERY 6 HOURS PRN
Qty: 30 TABLET | Refills: 2 | OUTPATIENT
Start: 2019-10-15

## 2019-10-15 NOTE — TELEPHONE ENCOUNTER
Reason for Disposition   Shock suspected (e.g., cold/pale/clammy skin, too weak to stand, low BP, rapid pulse)    Additional Information   Negative: Severe difficulty breathing (e.g., struggling for each breath, speaks in single words)    Protocols used: ABDOMINAL PAIN - UPPER-A-AH  Admit 10/10  Pt called re had surg thurs for kidney pblm. + carcinoid syndrome. Gave meds for stomach. Pt tearfully states she is taking pain meds for R sided stomach for several days. Tumors in liver. On abx x 5 days. ran out of pain meds. also having freq diarrhea.  Able to drink water. good uop- dark orange yellow. Some blood in urine. Spasm in vagina. Rates abd pain 9. rec EMS due to severe abd pain, weakness,+ vomiting. Pt states she will go to ED as she is afraid to go to hospital without family. Call back with questions

## 2019-10-16 NOTE — TELEPHONE ENCOUNTER
Spoke with pt. ,she had surgery on 10/10/19 Cystoscopy w/Retrograde Pyelogram. The next day pt. Started feeling nauseated and having diarrhea. Pt. Reached out twice to  office and no one has returned her call.pt. Complains of feeling very weak and has shallow breathing from the diarrhea. Pt. Reports going to the bathroom 10-12 times from 1am until now.pt. Is currently taking Doxycycline 100 mg Q12 hrs. Pt. Is afraid she will pass out out from feeling this weak.please advise

## 2019-10-16 NOTE — TELEPHONE ENCOUNTER
Sorry to hear that she is not feeling well - I'd recommend evaluation in the ED since she may be becoming dehydrated from fluid losses with severe diarrhea and she may need stool testing, etc to determine if this an due to an infection

## 2019-10-16 NOTE — TELEPHONE ENCOUNTER
----- Message from Ernestine Torres sent at 10/16/2019  8:59 AM CDT -----  Contact: Self   Type: Patient Call Back    Who called: Self     What is the request in detail:patient would like to speak with you about the nausea and diarrhea she has     Can the clinic reply by [facility]? No     Would the patient rather a call back or a response via [facility]?  Call     Best call back number:[phone]

## 2019-10-17 ENCOUNTER — TELEPHONE (OUTPATIENT)
Dept: NEUROLOGY | Facility: HOSPITAL | Age: 69
End: 2019-10-17

## 2019-10-17 ENCOUNTER — HOSPITAL ENCOUNTER (EMERGENCY)
Facility: HOSPITAL | Age: 69
Discharge: HOME | End: 2019-10-17
Attending: EMERGENCY MEDICINE
Payer: MEDICARE

## 2019-10-17 VITALS
SYSTOLIC BLOOD PRESSURE: 166 MMHG | OXYGEN SATURATION: 99 % | RESPIRATION RATE: 18 BRPM | WEIGHT: 184 LBS | BODY MASS INDEX: 29.7 KG/M2 | TEMPERATURE: 99 F | HEART RATE: 69 BPM | DIASTOLIC BLOOD PRESSURE: 73 MMHG

## 2019-10-17 DIAGNOSIS — R10.84 GENERALIZED ABDOMINAL PAIN: Primary | ICD-10-CM

## 2019-10-17 DIAGNOSIS — K52.9 CHRONIC DIARRHEA: ICD-10-CM

## 2019-10-17 LAB
ALBUMIN SERPL BCP-MCNC: 3.4 G/DL (ref 3.5–5.2)
ALP SERPL-CCNC: 124 U/L (ref 55–135)
ALT SERPL W/O P-5'-P-CCNC: 9 U/L (ref 10–44)
ANION GAP SERPL CALC-SCNC: 14 MMOL/L (ref 8–16)
AST SERPL-CCNC: 14 U/L (ref 10–40)
BASOPHILS # BLD AUTO: 0.02 K/UL (ref 0–0.2)
BASOPHILS NFR BLD: 0.4 % (ref 0–1.9)
BILIRUB SERPL-MCNC: 0.6 MG/DL (ref 0.1–1)
BILIRUB UR QL STRIP: NEGATIVE
BUN SERPL-MCNC: 17 MG/DL (ref 8–23)
CALCIUM SERPL-MCNC: 9.5 MG/DL (ref 8.7–10.5)
CHLORIDE SERPL-SCNC: 104 MMOL/L (ref 95–110)
CLARITY UR: ABNORMAL
CO2 SERPL-SCNC: 22 MMOL/L (ref 23–29)
COLOR UR: YELLOW
CREAT SERPL-MCNC: 1 MG/DL (ref 0.5–1.4)
DIFFERENTIAL METHOD: ABNORMAL
EOSINOPHIL # BLD AUTO: 0 K/UL (ref 0–0.5)
EOSINOPHIL NFR BLD: 0.4 % (ref 0–8)
ERYTHROCYTE [DISTWIDTH] IN BLOOD BY AUTOMATED COUNT: 14.3 % (ref 11.5–14.5)
EST. GFR  (AFRICAN AMERICAN): >60 ML/MIN/1.73 M^2
EST. GFR  (NON AFRICAN AMERICAN): 58 ML/MIN/1.73 M^2
GLUCOSE SERPL-MCNC: 113 MG/DL (ref 70–110)
GLUCOSE UR QL STRIP: NEGATIVE
HCT VFR BLD AUTO: 33.3 % (ref 37–48.5)
HGB BLD-MCNC: 10.3 G/DL (ref 12–16)
HGB UR QL STRIP: ABNORMAL
KETONES UR QL STRIP: NEGATIVE
LEUKOCYTE ESTERASE UR QL STRIP: ABNORMAL
LIPASE SERPL-CCNC: 12 U/L (ref 4–60)
LYMPHOCYTES # BLD AUTO: 0.9 K/UL (ref 1–4.8)
LYMPHOCYTES NFR BLD: 17.2 % (ref 18–48)
MCH RBC QN AUTO: 26.3 PG (ref 27–31)
MCHC RBC AUTO-ENTMCNC: 30.9 G/DL (ref 32–36)
MCV RBC AUTO: 85 FL (ref 82–98)
MICROSCOPIC COMMENT: ABNORMAL
MONOCYTES # BLD AUTO: 0.6 K/UL (ref 0.3–1)
MONOCYTES NFR BLD: 11.2 % (ref 4–15)
NEUTROPHILS # BLD AUTO: 3.7 K/UL (ref 1.8–7.7)
NEUTROPHILS NFR BLD: 70.8 % (ref 38–73)
NITRITE UR QL STRIP: NEGATIVE
PH UR STRIP: 6 [PH] (ref 5–8)
PLATELET # BLD AUTO: 247 K/UL (ref 150–350)
PMV BLD AUTO: 9.9 FL (ref 9.2–12.9)
POTASSIUM SERPL-SCNC: 3.3 MMOL/L (ref 3.5–5.1)
PROT SERPL-MCNC: 7.3 G/DL (ref 6–8.4)
PROT UR QL STRIP: NEGATIVE
RBC # BLD AUTO: 3.92 M/UL (ref 4–5.4)
RBC #/AREA URNS HPF: 4 /HPF (ref 0–4)
SODIUM SERPL-SCNC: 140 MMOL/L (ref 136–145)
SP GR UR STRIP: 1.02 (ref 1–1.03)
SQUAMOUS #/AREA URNS HPF: 4 /HPF
URN SPEC COLLECT METH UR: ABNORMAL
UROBILINOGEN UR STRIP-ACNC: NEGATIVE EU/DL
WBC # BLD AUTO: 5.29 K/UL (ref 3.9–12.7)
WBC #/AREA URNS HPF: 6 /HPF (ref 0–5)

## 2019-10-17 PROCEDURE — 81000 URINALYSIS NONAUTO W/SCOPE: CPT | Mod: HCNC

## 2019-10-17 PROCEDURE — 63600175 PHARM REV CODE 636 W HCPCS: Mod: HCNC | Performed by: EMERGENCY MEDICINE

## 2019-10-17 PROCEDURE — 96376 TX/PRO/DX INJ SAME DRUG ADON: CPT | Mod: HCNC

## 2019-10-17 PROCEDURE — 96374 THER/PROPH/DIAG INJ IV PUSH: CPT | Mod: HCNC

## 2019-10-17 PROCEDURE — 83690 ASSAY OF LIPASE: CPT | Mod: HCNC

## 2019-10-17 PROCEDURE — 80053 COMPREHEN METABOLIC PANEL: CPT | Mod: HCNC

## 2019-10-17 PROCEDURE — 85025 COMPLETE CBC W/AUTO DIFF WBC: CPT | Mod: HCNC

## 2019-10-17 PROCEDURE — 25000003 PHARM REV CODE 250: Mod: HCNC | Performed by: EMERGENCY MEDICINE

## 2019-10-17 PROCEDURE — 99284 EMERGENCY DEPT VISIT MOD MDM: CPT | Mod: 25,HCNC

## 2019-10-17 RX ORDER — MORPHINE SULFATE 4 MG/ML
4 INJECTION, SOLUTION INTRAMUSCULAR; INTRAVENOUS
Status: COMPLETED | OUTPATIENT
Start: 2019-10-17 | End: 2019-10-17

## 2019-10-17 RX ORDER — MORPHINE SULFATE 2 MG/ML
2 INJECTION, SOLUTION INTRAMUSCULAR; INTRAVENOUS
Status: COMPLETED | OUTPATIENT
Start: 2019-10-17 | End: 2019-10-17

## 2019-10-17 RX ORDER — METOPROLOL TARTRATE 50 MG/1
50 TABLET ORAL
Status: COMPLETED | OUTPATIENT
Start: 2019-10-17 | End: 2019-10-17

## 2019-10-17 RX ORDER — DICYCLOMINE HYDROCHLORIDE 10 MG/ML
20 INJECTION INTRAMUSCULAR
Status: DISCONTINUED | OUTPATIENT
Start: 2019-10-17 | End: 2019-10-17

## 2019-10-17 RX ADMIN — MORPHINE SULFATE 2 MG: 2 INJECTION, SOLUTION INTRAMUSCULAR; INTRAVENOUS at 12:10

## 2019-10-17 RX ADMIN — METOPROLOL TARTRATE 50 MG: 50 TABLET ORAL at 11:10

## 2019-10-17 RX ADMIN — MORPHINE SULFATE 4 MG: 4 INJECTION INTRAVENOUS at 10:10

## 2019-10-17 NOTE — ED NOTES
Provider notified of patients bp patient denies cp/ sob at this time states she did not take bp medication today

## 2019-10-17 NOTE — ED NOTES
69 year old female presents to ed cc of right side upper/ lower quadrant abdominal pain that began last week. Patient reports n/v/d denies fever chills cp sob at this time patient awake alert ox4 in no acute distress at this time. Nurse will continue to monitor

## 2019-10-17 NOTE — ED PROVIDER NOTES
Encounter Date: 10/17/2019    SCRIBE #1 NOTE: IJose De Jesus, am scribing for, and in the presence of, John Alexandra MD.       History     Chief Complaint   Patient presents with    Abdominal Pain     reports RLQ abd pain that radiates to right flank. reports was seen at [facility] yesterday for same symptoms. reports n/v/d for the past few days as well. reports had kidney stone removed on 10/10.          Pt is a 70 yo AAF with pmhx of HTN, abdominal carcinoma, as well as hx of kidney stones (she recently had a left cystoscopy on 10/10, known to have stones on the R) who presents to the ED with the complaint of abdominal pain. Patient reports epigastric abdominal pain that radiates to the right flank. She has had 2 recent ER visits within the past 3 days for similar complaints. Pt denies any new symptoms. She admits to yellowish vomiting and subjective fever since last week. Patient has chronic diarrhea and is on Lomotil. States she has been compliant with antihypertensive medication.     The history is provided by the patient.     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)      Past Surgical History:   Procedure Laterality Date    CATARACT EXTRACTION Left 10/2017    CHOLECYSTECTOMY      COLON SURGERY      cystoscope      CYSTOSCOPY W/ RETROGRADES Right 10/10/2019    Procedure: CYSTOSCOPY, WITH RETROGRADE PYELOGRAM;  Surgeon: Gen Isbell MD;  Location: [facility] OR;  Service:  Urology;  Laterality: Right;    EYE SURGERY      HYSTERECTOMY  5/1996    LITHOTRIPSY      LIVER BIOPSY  9/14    carcinoid    URETEROSCOPY Right 10/10/2019    Procedure: URETEROSCOPY;  Surgeon: Gen Isbell MD;  Location: [facility] OR;  Service: Urology;  Laterality: Right;     Family History   Problem Relation Age of Onset    Cancer Mother         unknown    Alzheimer's disease Father     Stroke Sister     No Known Problems Son     No Known Problems Son     No Known Problems Son     No Known Problems Son     Kidney disease Neg Hx      Social History     Tobacco Use    Smoking status: Never Smoker    Smokeless tobacco: Never Used   Substance Use Topics    Alcohol use: No    Drug use: No     Review of Systems   Constitutional: Negative for fever.   HENT: Negative for sore throat.    Respiratory: Negative for shortness of breath.    Cardiovascular: Negative for chest pain.   Gastrointestinal: Positive for abdominal pain, diarrhea (chronic) and nausea. Negative for blood in stool and vomiting.   Genitourinary: Positive for flank pain. Negative for dysuria.   Musculoskeletal: Negative for back pain.   Skin: Negative for rash.   Neurological: Negative for weakness.   Hematological: Does not bruise/bleed easily.   All other systems reviewed and are negative.      Physical Exam     Initial Vitals [10/17/19 0800]   BP Pulse Resp Temp SpO2   (!) 227/100 72 20 97.7 °F (36.5 °C) 98 %      MAP       --         Physical Exam    Nursing note and vitals reviewed.  Constitutional: She appears well-developed and well-nourished. No distress.   HENT:   Head: Normocephalic and atraumatic.   Right Ear: External ear normal.   Left Ear: External ear normal.   Eyes: EOM are normal. Pupils are equal, round, and reactive to light.   Neck: Normal range of motion. Neck supple.   Cardiovascular: Normal rate, regular rhythm, normal heart sounds and intact distal pulses.   Pulmonary/Chest: Breath sounds normal. No respiratory  distress. She has no wheezes.   Abdominal: Soft. She exhibits no distension. There is tenderness. There is no rebound and no guarding.   Mild diffuse TTP  No rebound  No guarding  No peritoneal signs  Normal bowel sounds in all four quadrants      Musculoskeletal: Normal range of motion. She exhibits no edema.   Neurological: She is alert and oriented to person, place, and time.   Skin: Skin is warm and dry.   Psychiatric: She has a normal mood and affect.         ED Course   Procedures  Labs Reviewed   URINALYSIS, REFLEX TO URINE CULTURE - Abnormal; Notable for the following components:       Result Value    Appearance, UA Hazy (*)     Occult Blood UA 1+ (*)     Leukocytes, UA 1+ (*)     All other components within normal limits    Narrative:     Preferred Collection Type->Urine, Clean Catch   CBC W/ AUTO DIFFERENTIAL - Abnormal; Notable for the following components:    RBC 3.92 (*)     Hemoglobin 10.3 (*)     Hematocrit 33.3 (*)     Mean Corpuscular Hemoglobin 26.3 (*)     Mean Corpuscular Hemoglobin Conc 30.9 (*)     Lymph # 0.9 (*)     Lymph% 17.2 (*)     All other components within normal limits   COMPREHENSIVE METABOLIC PANEL - Abnormal; Notable for the following components:    Potassium 3.3 (*)     CO2 22 (*)     Glucose 113 (*)     Albumin 3.4 (*)     ALT 9 (*)     eGFR if non  58 (*)     All other components within normal limits   URINALYSIS MICROSCOPIC - Abnormal; Notable for the following components:    WBC, UA 6 (*)     All other components within normal limits    Narrative:     Preferred Collection Type->Urine, Clean Catch   LIPASE          Imaging Results          X-Ray Abdomen Flat And Erect (Final result)  Result time 10/17/19 10:26:41    Final result by Rodolfo Jones MD (10/17/19 10:26:41)                 Impression:      As above.      Electronically signed by: Rodolfo Jones  Date:    10/17/2019  Time:    10:26             Narrative:    EXAMINATION:  XR ABDOMEN FLAT AND  ERECT    CLINICAL HISTORY:  Unspecified abdominal pain    TECHNIQUE:  Flat and erect AP views of the abdomen were performed.    COMPARISON:  Abdominal radiograph dated 10/16/2019    FINDINGS:  Calcific densities projecting over the right renal shadow in keeping with known calculi.  Few pelvic phleboliths.  Relative paucity of gas throughout the abdomen.  Lumbosacral DJD.  Post cholecystectomy.                                 Medical Decision Making:   Clinical Tests:   Lab Tests: Ordered and Reviewed  Radiological Study: Reviewed and Ordered  ED Management:  -AXR without free air under diaphragm, dilated loops of bowel per my interpretation, final radiology read   -UA & microscopic WNL   -CBC w/diff without significant leukocytosis; H/H stable   -CMP with K of 3.3, but otherwise without significant electrolyte abnormality; will replete in ED   -Lipase WNL  -Pt administered IVF, refused bentyl  -Pt pain improved with morphine  -Discussed workup with Dr. Perez  who is very familiar with the patient. He believes she is drug seeking. She was getting outpatient treatment with pain management and quit.  -No further intervention required at this time; pt with PO pain medications at home, Lomitil for chronic diarrhea   -No further intervention is indicated at this time after having taken into account the patient's history, physical exam findings, and empirical and objective data obtained during the patient's emergency department workup.   - The patient is at low risk for an emergent medical condition at this time, and I am of the belief that that it is safe to discharge the patient from the emergency department.   - The patient is instructed to follow up as outpatient as indicated on the discharge paperwork.    - I have discussed the specifics of the workup with the patient and the patient has verbalized understanding of the details of the workup, the diagnosis, the treatment plan, and the need for outpatient follow-up.     - Although the patient has no emergent etiology today this does not preclude the development of an emergent condition so, in addition, I have advised the patient that they can return to the ED and/or activate EMS at any time with worsening of their symptoms, change of their symptoms, or with any other medical complaint.    - The patient remained comfortable and stable during their visit in the ED.    - Discharge and follow-up instructions discussed with the patient who expressed understanding and willingness to comply with my recommendations.  - Results of all emergency department tests  discussed thoroughly with patient; all patient questions answered; pt in agreement with plan  - Pt instructed to follow up with PCP in 2-3 days for recheck of today's complaints  - Pt given strict emergency department return precautions for any new or worsening of symptoms  - Pt discharged from the emergency department in stable condition, in no acute distress                          Clinical Impression:       ICD-10-CM ICD-9-CM   1. Generalized abdominal pain R10.84 789.07   2. Chronic diarrhea K52.9 787.91           Disposition:   Disposition: Discharged  Condition: Stable       I, John Alexandra,  personally performed the services described in this documentation. All medical record entries made by the scribe were at my direction and in my presence.  I have reviewed the chart and agree that the record reflects my personal performance and is accurate and complete. John Alexandra M.D. 11:18 PM10/17/2019                 John Alexandra MD  10/17/19 [id number]

## 2019-10-17 NOTE — ED NOTES
APPEARANCE: Alert, oriented and in no acute distress.  CARDIAC: Normal rate and rhythm, no murmur heard.   PERIPHERAL VASCULAR: peripheral pulses present. Normal cap refill. No edema. Warm to touch.    RESPIRATORY:Normal rate and effort, breath sounds clear bilaterally throughout chest. Respirations are equal and unlabored no obvious signs of distress.  MUSC: Full ROM. No bony tenderness or soft tissue tenderness. No obvious deformity.  SKIN: Skin is warm and dry, normal skin turgor, mucous membranes moist.  NEURO: 5/5 strength major flexors/extensors bilaterally. Sensory intact to light touch bilaterally. [place] coma scale: eyes open spontaneously-4, oriented & converses-5, obeys commands-6. No neurological abnormalities.   MENTAL STATUS: awake, alert and aware of environment.  EYE: PERRL, both eyes: pupils brisk and reactive to light. Normal size.  ENT: EARS: no obvious drainage. NOSE: no active bleeding.

## 2019-10-18 ENCOUNTER — NURSE TRIAGE (OUTPATIENT)
Dept: ADMINISTRATIVE | Facility: CLINIC | Age: 69
End: 2019-10-18

## 2019-10-18 NOTE — TELEPHONE ENCOUNTER
"Reason for Disposition   Severe headache    Additional Information   Negative: ACUTE NEUROLOGIC SYMPTOM and symptom present now   Negative: Knocked out (unconscious) > 1 minute   Negative: Seizure (convulsion) occurred (Exception: prior history of seizures and now alert and without Acute Neurologic Symptoms)   Negative: Neck pain after dangerous injury (e.g., MVA, diving, trampoline, contact sports, fall > 10 feet or 3 meters) (Exception: neck pain began > 1 hour after injury)   Negative: Major bleeding (actively dripping or spurting) that can't be stopped   Negative: Penetrating head injury (e.g., knife, gunshot wound, metal object)   Negative: Sounds like a life-threatening emergency to the triager   Negative: Can't remember what happened (amnesia)   Negative: Vomiting once or more   Negative: Watery or blood-tinged fluid dripping from the nose or ears   Negative: ACUTE NEUROLOGIC SYMPTOM and now fine   Negative: Knocked out (unconscious) < 1 minute and now fine    Protocols used: HEAD INJURY-A-OH    Pt called crying stated she's bee having diarrhea since 10/10/19 and she's weak. Pt stated she was recently in er. Pt stated  "I can't eat or drink water". Pt stated she  fell and hit her head on the rt side and the pain is 9/10. Pt stated it's "like I'm seeing stars. It fill light. I'm scared to get up." Care advice recommends pt go to Er. Pt stated she can't get up and she doesn't have anyone to bring her. Pt then starts crying again and said the back of head is hurting now. Pt instructed to call 911. Pt verbalized understanding.       "

## 2019-10-21 ENCOUNTER — TELEPHONE (OUTPATIENT)
Dept: NEUROLOGY | Facility: HOSPITAL | Age: 69
End: 2019-10-21

## 2019-10-21 NOTE — TELEPHONE ENCOUNTER
Pt in hospital, will cancel appt scheduled on Thursday, 10/24/19.  Pt to call clinic when ready to reschedule.

## 2019-10-25 ENCOUNTER — TELEPHONE (OUTPATIENT)
Dept: NEUROLOGY | Facility: HOSPITAL | Age: 69
End: 2019-10-25

## 2019-10-25 NOTE — TELEPHONE ENCOUNTER
----- Message from Christina Oreilly sent at 10/25/2019  9:53 AM CDT -----  Contact: Pt/ [phone]  BRIAN------Pt is calling in regards to missing an appt on 10/24/2019 andd was informed to call and reschedule the appt.    Please call and advise.

## 2019-10-30 ENCOUNTER — TELEPHONE (OUTPATIENT)
Dept: FAMILY MEDICINE | Facility: CLINIC | Age: 69
End: 2019-10-30

## 2019-10-30 NOTE — TELEPHONE ENCOUNTER
----- Message from Ghislaine Barrett sent at 10/30/2019  9:53 AM CDT -----  Contact: Self   Type: Patient Call Back    What is the request in detail: Pt requesting orders for home health to go to her home.    Can the clinic reply by [facility]? No    Would the patient rather a call back or a response via [facility]? Call back     Best call back number: [phone]

## 2019-10-31 ENCOUNTER — OFFICE VISIT (OUTPATIENT)
Dept: NEUROLOGY | Facility: HOSPITAL | Age: 69
End: 2019-10-31
Attending: SURGERY
Payer: MEDICARE

## 2019-10-31 ENCOUNTER — TELEPHONE (OUTPATIENT)
Dept: NEUROLOGY | Facility: HOSPITAL | Age: 69
End: 2019-10-31

## 2019-10-31 ENCOUNTER — EXTERNAL CHRONIC CARE MANAGEMENT (OUTPATIENT)
Dept: PRIMARY CARE CLINIC | Facility: CLINIC | Age: 69
End: 2019-10-31
Payer: MEDICARE

## 2019-10-31 VITALS
HEART RATE: 55 BPM | WEIGHT: 187.25 LBS | DIASTOLIC BLOOD PRESSURE: 69 MMHG | BODY MASS INDEX: 30.09 KG/M2 | HEIGHT: 66 IN | SYSTOLIC BLOOD PRESSURE: 140 MMHG | TEMPERATURE: 97 F

## 2019-10-31 DIAGNOSIS — C7B.8 SECONDARY NEUROENDOCRINE TUMOR OF LIVER: ICD-10-CM

## 2019-10-31 DIAGNOSIS — C7A.012 MALIGNANT CARCINOID TUMOR OF ILEUM: Primary | ICD-10-CM

## 2019-10-31 DIAGNOSIS — C7B.8 SECONDARY NEUROENDOCRINE TUMOR OF DISTANT LYMPH NODES: ICD-10-CM

## 2019-10-31 DIAGNOSIS — E34.0 CARCINOID SYNDROME: ICD-10-CM

## 2019-10-31 PROCEDURE — 99490 PR CHRONIC CARE MGMT, 1ST 20 MIN: ICD-10-PCS | Mod: S$GLB,,, | Performed by: FAMILY MEDICINE

## 2019-10-31 PROCEDURE — 99490 CHRNC CARE MGMT STAFF 1ST 20: CPT | Mod: S$GLB,,, | Performed by: FAMILY MEDICINE

## 2019-10-31 PROCEDURE — 99213 OFFICE O/P EST LOW 20 MIN: CPT | Mod: HCNC | Performed by: SURGERY

## 2019-10-31 RX ORDER — CEPHALEXIN 500 MG/1
500 CAPSULE ORAL 4 TIMES DAILY
Qty: 40 CAPSULE | Refills: 1 | Status: SHIPPED | OUTPATIENT
Start: 2019-10-31 | End: 2019-11-10

## 2019-10-31 NOTE — TELEPHONE ENCOUNTER
Spoke with pt and let her know that her scans were changed to 11/21/19 at 800 am and 11/22/19 at 800 am

## 2019-10-31 NOTE — PATIENT INSTRUCTIONS
Your next appointment with  is 11/21/19 at 1100 am      You will be starting Lamreotide 120mg every 4 weeks. Marquita Patton will call you to schedule this injection      The octreotide scan is schedule 11/14/19 and 11/15/19 at 800 am

## 2019-10-31 NOTE — TELEPHONE ENCOUNTER
----- Message from Bella Franco sent at 10/31/2019 12:50 PM CDT -----  Contact: [phone]/self  BRIAN  Patient is requesting to speak with you regarding her upcoming appointments. She is requesting Nov. 21 and 22 for the test. Please advise.

## 2019-10-31 NOTE — PROGRESS NOTES
"NOLAN[place]:  [facility]  A collaboration between [facility] and [facility]      PATIENT: Patito Domingo  MRN: [id number]  DATE: 10/31/2019    Subjective:      Chief Complaint: Follow-up (hospital follow up)  recently was in hospital for rt renal stone    Still continued to have frequent ER visits for pain   is not taking any octreotide injections at all    Still having diarrhea 2-3 times a day    Significant fatigue    Vitals: Blood pressure (!) 140/69, pulse (!) 55, temperature 97.3 °F (36.3 °C), temperature source Oral, height 5' 6" (1.676 m), weight 84.9 kg (187 lb 4.5 oz).     ECOG Score: 2 - Symptomatic, <50% confined to bed    Diagnosis: No diagnosis found.     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)        Past Surgical History:  Past Surgical History:   Procedure Laterality Date    CATARACT EXTRACTION Left 10/2017    CHOLECYSTECTOMY      COLON SURGERY      cystoscope      CYSTOSCOPY W/ RETROGRADES Right 10/10/2019    Procedure: CYSTOSCOPY, WITH RETROGRADE PYELOGRAM;  Surgeon: Gen Isbell MD;  Location: [facility] OR;  Service: Urology;  Laterality: Right;    EYE SURGERY      HYSTERECTOMY  5/1996    LITHOTRIPSY      LIVER BIOPSY  9/14    carcinoid    URETEROSCOPY Right 10/10/2019    Procedure: URETEROSCOPY;  Surgeon: Gen Isbell MD;  Location: [facility] OR;  Service: Urology;  Laterality: Right;       Family History:  Family History   Problem Relation Age of Onset    Cancer Mother         unknown    Alzheimer's disease Father     Stroke Sister     No Known Problems " Son     No Known Problems Son     No Known Problems Son     No Known Problems Son     Kidney disease Neg Hx        Allergies:  Epinephrine; Contrast media; Ibuprofen; Iodinated contrast media; and Sulfa (sulfonamide antibiotics)    Medications:   Current Outpatient Medications   Medication Sig    cyanocobalamin (VITAMIN B-12) 1000 MCG tablet Take 1 tablet (1,000 mcg total) by mouth once daily.    diphenoxylate-atropine 2.5-0.025 mg (LOMOTIL) 2.5-0.025 mg per tablet Take 1 tablet by mouth 4 (four) times daily as needed for Diarrhea (for episodic diarrhea).    ferrous sulfate (FEOSOL) 325 mg (65 mg iron) Tab tablet TAKE ONE TABLET BY MOUTH THREE TIMES PER WEEK    losartan (COZAAR) 50 MG tablet Take 1 tablet (50 mg total) by mouth once daily.    losartan (COZAAR) 50 MG tablet Take 1 tablet (50 mg total) by mouth once daily.    metoprolol tartrate (LOPRESSOR) 50 MG tablet Take 1 tablet (50 mg total) by mouth 2 (two) times daily.    oxyCODONE (ROXICODONE) 15 MG Tab TK 1 T PO  Q 4 H prn    promethazine (PHENERGAN) 12.5 MG Tab Take 2 tablets (25 mg total) by mouth every 6 (six) hours as needed.    tamsulosin (FLOMAX) 0.4 mg Cap Take 1 capsule (0.4 mg total) by mouth every evening.    trolamine salicylate (ASPERCREME) 10 % cream Apply topically as needed.    acetaminophen (TYLENOL) 325 MG tablet Take 1 tablet (325 mg total) by mouth every 6 (six) hours as needed for Pain. (Patient not taking: Reported on 10/31/2019)    bumetanide (BUMEX) 0.5 MG Tab Take 1 tablet (0.5 mg total) by mouth daily as needed. (Patient not taking: Reported on 10/31/2019)    diclofenac sodium (VOLTAREN) 1 % Gel Apply the gel (2 g) to the affected area 4 times daily. Do not apply more than 8 g daily to any one affected joint of the upper extremities. (Patient not taking: Reported on 10/31/2019)    dicyclomine (BENTYL) 20 mg tablet Take 1 tablet (20 mg total) by mouth 2 (two) times daily. (Patient not taking: Reported on 10/31/2019)     magnesium oxide (MAG-OX) 400 mg (241.3 mg magnesium) tablet Take 1 tablet (400 mg total) by mouth once daily. (Patient not taking: Reported on 10/31/2019)     No current facility-administered medications for this visit.         Review of Systems   Constitutional: Positive for activity change, appetite change and fatigue. Negative for chills, diaphoresis, fever and unexpected weight change.   HENT: Negative for congestion, drooling, ear discharge, ear pain, facial swelling, mouth sores, nosebleeds, postnasal drip, rhinorrhea, sinus pressure, sinus pain, sneezing and sore throat.    Eyes: Negative for photophobia, pain, redness, itching and visual disturbance.   Respiratory: Negative for apnea, cough, choking, chest tightness, shortness of breath, wheezing and stridor.    Cardiovascular: Negative for palpitations and leg swelling.   Gastrointestinal: Positive for diarrhea. Negative for anal bleeding, blood in stool, constipation, rectal pain and vomiting.   Endocrine: Negative.    Genitourinary: Positive for dysuria.   Musculoskeletal: Positive for arthralgias, back pain, gait problem, joint swelling, myalgias, neck pain and neck stiffness.   Skin: Negative for pallor, rash and wound.   Allergic/Immunologic: Negative for food allergies and immunocompromised state.   Neurological: Negative for tremors, seizures, facial asymmetry, speech difficulty, weakness, light-headedness and numbness.   Hematological: Does not bruise/bleed easily.   Psychiatric/Behavioral: Negative for confusion and decreased concentration.      Objective:      Physical Exam   Constitutional: She is oriented to person, place, and time. She appears well-developed and well-nourished.   HENT:   Head: Normocephalic and atraumatic.   Right Ear: External ear normal.   Left Ear: External ear normal.   Mouth/Throat: Oropharynx is clear and moist.   Eyes: Pupils are equal, round, and reactive to light. Conjunctivae and EOM are normal.   Neck: Normal  range of motion.   Cardiovascular: Normal rate and regular rhythm.   Pulmonary/Chest: Effort normal and breath sounds normal.   Abdominal: Soft. Bowel sounds are normal. She exhibits no distension and no mass. There is no tenderness. There is no guarding.   Musculoskeletal: Normal range of motion.   Neurological: She is alert and oriented to person, place, and time.   Skin: Skin is warm and dry.   Psychiatric: She has a normal mood and affect. Her behavior is normal.      Assessment:       No diagnosis found.    Laboratory Data:   Results for orders placed or performed during the hospital encounter of 10/17/19   Urinalysis, Reflex to Urine Culture Urine, Clean Catch   Result Value Ref Range    Specimen UA Urine, Clean Catch     Color, UA Yellow Yellow, Straw, [place]    Appearance, UA Hazy (A) Clear    pH, UA 6.0 5.0 - 8.0    Specific Gravity, UA 1.020 1.005 - 1.030    Protein, UA Negative Negative    Glucose, UA Negative Negative    Ketones, UA Negative Negative    Bilirubin (UA) Negative Negative    Occult Blood UA 1+ (A) Negative    Nitrite, UA Negative Negative    Urobilinogen, UA Negative <2.0 EU/dL    Leukocytes, UA 1+ (A) Negative   CBC auto differential   Result Value Ref Range    WBC 5.29 3.90 - 12.70 K/uL    RBC 3.92 (L) 4.00 - 5.40 M/uL    Hemoglobin 10.3 (L) 12.0 - 16.0 g/dL    Hematocrit 33.3 (L) 37.0 - 48.5 %    Mean Corpuscular Volume 85 82 - 98 fL    Mean Corpuscular Hemoglobin 26.3 (L) 27.0 - 31.0 pg    Mean Corpuscular Hemoglobin Conc 30.9 (L) 32.0 - 36.0 g/dL    RDW 14.3 11.5 - 14.5 %    Platelets 247 150 - 350 K/uL    MPV 9.9 9.2 - 12.9 fL    Gran # (ANC) 3.7 1.8 - 7.7 K/uL    Lymph # 0.9 (L) 1.0 - 4.8 K/uL    Mono # 0.6 0.3 - 1.0 K/uL    Eos # 0.0 0.0 - 0.5 K/uL    Baso # 0.02 0.00 - 0.20 K/uL    Gran% 70.8 38.0 - 73.0 %    Lymph% 17.2 (L) 18.0 - 48.0 %    Mono% 11.2 4.0 - 15.0 %    Eosinophil% 0.4 0.0 - 8.0 %    Basophil% 0.4 0.0 - 1.9 %    Differential Method Automated    Comprehensive metabolic  panel   Result Value Ref Range    Sodium 140 136 - 145 mmol/L    Potassium 3.3 (L) 3.5 - 5.1 mmol/L    Chloride 104 95 - 110 mmol/L    CO2 22 (L) 23 - 29 mmol/L    Glucose 113 (H) 70 - 110 mg/dL    BUN, Bld 17 8 - 23 mg/dL    Creatinine 1.0 0.5 - 1.4 mg/dL    Calcium 9.5 8.7 - 10.5 mg/dL    Total Protein 7.3 6.0 - 8.4 g/dL    Albumin 3.4 (L) 3.5 - 5.2 g/dL    Total Bilirubin 0.6 0.1 - 1.0 mg/dL    Alkaline Phosphatase 124 55 - 135 U/L    AST 14 10 - 40 U/L    ALT 9 (L) 10 - 44 U/L    Anion Gap 14 8 - 16 mmol/L    eGFR if African American >60 >60 mL/min/1.73 m^2    eGFR if non African American 58 (A) >60 mL/min/1.73 m^2   Lipase   Result Value Ref Range    Lipase 12 4 - 60 U/L   Urinalysis Microscopic   Result Value Ref Range    RBC, UA 4 0 - 4 /hpf    WBC, UA 6 (H) 0 - 5 /hpf    Squam Epithel, UA 4 /hpf    Microscopic Comment SEE COMMENT        Scans:   X-Ray Abdomen Flat And Erect  Narrative: EXAMINATION:  XR ABDOMEN FLAT AND ERECT    CLINICAL HISTORY:  Unspecified abdominal pain    TECHNIQUE:  Flat and erect AP views of the abdomen were performed.    COMPARISON:  Abdominal radiograph dated 10/16/2019    FINDINGS:  Calcific densities projecting over the right renal shadow in keeping with known calculi.  Few pelvic phleboliths.  Relative paucity of gas throughout the abdomen.  Lumbosacral DJD.  Post cholecystectomy.  Impression: As above.    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  She has had 1 round of chemo embolization and had several issues with pain therefore the whole treatment    Has been postponed.  She continues to have diarrhea her main problem is fatigue and pain she was recently in the ER and found to have renal stones for which she is seeing a urologist.    Her appetite is good she is able to eat well her ambulation is limited    With the walker  she lives alone however her children are close by    Plan:       Will obtain a octreotide scan CT fused  Will resume Lanreotide injection  Will discuss at the tumor board  Follow-up in 1 month time          AMY Perez MD, FACS   Associate Professor of Surgery, [facility]   Neuroendocrine Surgery, Hepatic/Pancreatic & General Surgery   [address], [address]   [place] [address]   ph. [phone]; [phone]   fax. [phone]

## 2019-11-04 ENCOUNTER — OFFICE VISIT (OUTPATIENT)
Dept: FAMILY MEDICINE | Facility: CLINIC | Age: 69
End: 2019-11-04
Payer: MEDICARE

## 2019-11-04 VITALS
SYSTOLIC BLOOD PRESSURE: 140 MMHG | WEIGHT: 188.06 LBS | OXYGEN SATURATION: 96 % | HEART RATE: 60 BPM | BODY MASS INDEX: 30.22 KG/M2 | HEIGHT: 66 IN | DIASTOLIC BLOOD PRESSURE: 64 MMHG | TEMPERATURE: 98 F

## 2019-11-04 DIAGNOSIS — R11.0 NAUSEA: ICD-10-CM

## 2019-11-04 DIAGNOSIS — Z23 NEED FOR INFLUENZA VACCINATION: ICD-10-CM

## 2019-11-04 DIAGNOSIS — E87.6 HYPOKALEMIA: ICD-10-CM

## 2019-11-04 DIAGNOSIS — N30.00 ACUTE CYSTITIS WITHOUT HEMATURIA: Primary | ICD-10-CM

## 2019-11-04 PROCEDURE — 99999 PR PBB SHADOW E&M-EST. PATIENT-LVL III: ICD-10-PCS | Mod: PBBFAC,HCNC,, | Performed by: INTERNAL MEDICINE

## 2019-11-04 PROCEDURE — 99214 OFFICE O/P EST MOD 30 MIN: CPT | Mod: HCNC,S$GLB,, | Performed by: INTERNAL MEDICINE

## 2019-11-04 PROCEDURE — 99999 PR PBB SHADOW E&M-EST. PATIENT-LVL III: CPT | Mod: PBBFAC,HCNC,, | Performed by: INTERNAL MEDICINE

## 2019-11-04 PROCEDURE — 99214 PR OFFICE/OUTPT VISIT, EST, LEVL IV, 30-39 MIN: ICD-10-PCS | Mod: HCNC,S$GLB,, | Performed by: INTERNAL MEDICINE

## 2019-11-04 RX ORDER — ONDANSETRON 4 MG/1
4 TABLET, FILM COATED ORAL EVERY 8 HOURS PRN
Qty: 30 TABLET | Refills: 0 | Status: SHIPPED | OUTPATIENT
Start: 2019-11-04 | End: 2020-07-21

## 2019-11-04 RX ORDER — POTASSIUM CHLORIDE 750 MG/1
10 TABLET, EXTENDED RELEASE ORAL 2 TIMES DAILY
Qty: 60 TABLET | Refills: 0 | Status: SHIPPED | OUTPATIENT
Start: 2019-11-04 | End: 2019-12-04

## 2019-11-04 RX ORDER — CLONIDINE HYDROCHLORIDE 0.1 MG/1
TABLET ORAL
Refills: 1 | Status: ON HOLD | COMMUNITY
Start: 2019-10-24 | End: 2021-03-09

## 2019-11-04 RX ORDER — DOXYCYCLINE HYCLATE 100 MG
100 TABLET ORAL 2 TIMES DAILY
Qty: 12 TABLET | Refills: 0 | Status: SHIPPED | OUTPATIENT
Start: 2019-11-04 | End: 2019-11-10

## 2019-11-04 NOTE — PROGRESS NOTES
Subjective:       Chief Complaint  Chief Complaint   Patient presents with    Hospital Follow Up       HPI  Patito Domingo is a 69 y.o. female with multiple medical diagnoses as listed in the medical history and problem list that presents for ED follow-up    Patient recently evaluated by Urology for recurrent nephrolithiasis and recurrent cystitis  She states that she continues having right side pain   Taking Keflex since 10/31 to complete course on 11/10 - however noting difficulty with swallowing capsules    Still Dr Gerber/Pain Management  Having nausea today      Family and/or Caretaker present at visit?  No.  Diagnostic tests reviewed/disposition: No diagnosic tests pending after this hospitalization.  Disease/illness education: discussed   Home health/community services discussion/referrals: Patient does not have home health established from hospital visit.  They do not need home health.  If needed, we will set up home health for the patient.   Establishment or re-establishment of referral orders for community resources: No other necessary community resources.   Discussion with other health care providers: No discussion with other health care providers necessary.       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)        PAST SURGICAL HISTORY:  Past Surgical History:   Procedure Laterality Date    CATARACT EXTRACTION Left 10/2017    CHOLECYSTECTOMY      COLON SURGERY      cystoscope       CYSTOSCOPY W/ RETROGRADES Right 10/10/2019    Procedure: CYSTOSCOPY, WITH RETROGRADE PYELOGRAM;  Surgeon: Gen Isbell MD;  Location: [facility];  Service: Urology;  Laterality: Right;    EYE SURGERY      HYSTERECTOMY  5/1996    LITHOTRIPSY      LIVER BIOPSY  9/14    carcinoid    URETEROSCOPY Right 10/10/2019    Procedure: URETEROSCOPY;  Surgeon: Gen Isbell MD;  Location: [facility];  Service: Urology;  Laterality: Right;       SOCIAL HISTORY:  Social History     Socioeconomic History    Marital status:      Spouse name: Not on file    Number of children: Not on file    Years of education: Not on file    Highest education level: Not on file   Occupational History    Occupation: disabled    Social Needs    Financial resource strain: Not on file    Food insecurity:     Worry: Not on file     Inability: Not on file    Transportation needs:     Medical: Not on file     Non-medical: Not on file   Tobacco Use    Smoking status: Never Smoker    Smokeless tobacco: Never Used   Substance and Sexual Activity    Alcohol use: No    Drug use: No    Sexual activity: Not Currently   Lifestyle    Physical activity:     Days per week: Not on file     Minutes per session: Not on file    Stress: Not on file   Relationships    Social connections:     Talks on phone: Not on file     Gets together: Not on file     Attends Taoist service: Not on file     Active member of club or organization: Not on file     Attends meetings of clubs or organizations: Not on file     Relationship status: Not on file   Other Topics Concern    Not on file   Social History Narrative    Not on file       FAMILY HISTORY:  Family History   Problem Relation Age of Onset    Cancer Mother         unknown    Alzheimer's disease Father     Stroke Sister     No Known Problems Son     No Known Problems Son     No Known Problems Son     No Known Problems Son     Kidney disease Neg Hx        ALLERGIES AND  MEDICATIONS: updated and reviewed.  Review of patient's allergies indicates:   Allergen Reactions    Epinephrine Anaphylaxis     Can cause  a Carcinoid Crisis    Contrast media Hives, Itching and Swelling    Ibuprofen Hives, Itching and Swelling    Iodinated contrast media     Sulfa (sulfonamide antibiotics) Hives, Itching and Swelling     Current Outpatient Medications   Medication Sig Dispense Refill    acetaminophen (TYLENOL) 325 MG tablet Take 1 tablet (325 mg total) by mouth every 6 (six) hours as needed for Pain.      bumetanide (BUMEX) 0.5 MG Tab Take 1 tablet (0.5 mg total) by mouth daily as needed. 30 tablet 2    cephALEXin (KEFLEX) 500 MG capsule Take 1 capsule (500 mg total) by mouth 4 (four) times daily. for 10 days 40 capsule 1    cloNIDine (CATAPRES) 0.1 MG tablet TAKE ONE TABLET BY MOUTH THREE TIMES DAILY AS NEEDED FOR signs of withdrawal  1    cyanocobalamin (VITAMIN B-12) 1000 MCG tablet Take 1 tablet (1,000 mcg total) by mouth once daily. 30 tablet 5    diclofenac sodium (VOLTAREN) 1 % Gel Apply the gel (2 g) to the affected area 4 times daily. Do not apply more than 8 g daily to any one affected joint of the upper extremities. 100 g 1    dicyclomine (BENTYL) 20 mg tablet Take 1 tablet (20 mg total) by mouth 2 (two) times daily. 20 tablet 0    diphenoxylate-atropine 2.5-0.025 mg (LOMOTIL) 2.5-0.025 mg per tablet Take 1 tablet by mouth 4 (four) times daily as needed for Diarrhea (for episodic diarrhea). 30 tablet 0    ferrous sulfate (FEOSOL) 325 mg (65 mg iron) Tab tablet TAKE ONE TABLET BY MOUTH THREE TIMES PER WEEK 12 tablet 2    losartan (COZAAR) 50 MG tablet Take 1 tablet (50 mg total) by mouth once daily. 30 tablet 2    magnesium oxide (MAG-OX) 400 mg (241.3 mg magnesium) tablet Take 1 tablet (400 mg total) by mouth once daily. 30 tablet 2    metoprolol tartrate (LOPRESSOR) 50 MG tablet Take 1 tablet (50 mg total) by mouth 2 (two) times daily. 180 tablet 0    oxyCODONE  "(ROXICODONE) 15 MG Tab TK 1 T PO  Q 4 H prn  0    promethazine (PHENERGAN) 12.5 MG Tab Take 2 tablets (25 mg total) by mouth every 6 (six) hours as needed. 30 tablet 2    tamsulosin (FLOMAX) 0.4 mg Cap Take 1 capsule (0.4 mg total) by mouth every evening. 30 capsule 0    trolamine salicylate (ASPERCREME) 10 % cream Apply topically as needed.      doxycycline (VIBRA-TABS) 100 MG tablet Take 1 tablet (100 mg total) by mouth 2 (two) times daily. for 6 days 12 tablet 0    ondansetron (ZOFRAN) 4 MG tablet Take 1 tablet (4 mg total) by mouth every 8 (eight) hours as needed for Nausea. 30 tablet 0    potassium chloride SA (K-DUR,KLOR-CON) 10 MEQ tablet Take 1 tablet (10 mEq total) by mouth 2 (two) times daily. 60 tablet 0     No current facility-administered medications for this visit.          ROS  Review of Systems   Constitutional: Negative for appetite change, chills, fever and unexpected weight change.   Respiratory: Negative for cough and shortness of breath.    Cardiovascular: Negative for chest pain, palpitations and leg swelling.   Gastrointestinal: Negative for abdominal pain, constipation, diarrhea, nausea and vomiting.   Genitourinary: Positive for dysuria and flank pain.   Skin: Negative.    Neurological: Positive for weakness. Negative for dizziness, light-headedness and headaches.   Psychiatric/Behavioral: Negative for dysphoric mood. The patient is not nervous/anxious.          Objective:       Physical Exam  Vitals:    11/04/19 1142   BP: (!) 140/64   BP Location: Right arm   Patient Position: Sitting   BP Method: Medium (Manual)   Pulse: 60   Temp: 98.2 °F (36.8 °C)   TempSrc: Oral   SpO2: 96%   Weight: 85.3 kg (188 lb 0.8 oz)   Height: 5' 6" (1.676 m)    Body mass index is 30.35 kg/m².  Weight: 85.3 kg (188 lb 0.8 oz)   Height: 5' 6" (167.6 cm)   Physical Exam   Constitutional: She appears well-developed. No distress.   HENT:   Head: Normocephalic and atraumatic.   Eyes: Conjunctivae and EOM are " normal.   Neck: Normal range of motion.   Cardiovascular: Normal rate.   No murmur heard.  Pulmonary/Chest: Effort normal.   Musculoskeletal: She exhibits tenderness (R flank). She exhibits no edema or deformity.   Neurological: She is alert. No cranial nerve deficit.   Skin: Skin is warm and dry.   Psychiatric: She has a normal mood and affect. Her behavior is normal.   Vitals reviewed.          Assessment:     1. Acute cystitis without hematuria    2. Hypokalemia    3. Nausea    4. Need for influenza vaccination      Plan:     Violet was seen today for hospital follow up.    Diagnoses and all orders for this visit:    Acute cystitis without hematuria  On Keflex, not tolerating pill currently  Switched to doxycycline given most recent sensitivities on urine culture   -     doxycycline (VIBRA-TABS) 100 MG tablet; Take 1 tablet (100 mg total) by mouth 2 (two) times daily. for 6 days    Hypokalemia  K+ 3.3 recent check  Repeat, add potassium supplementation if remains low  -     Basic metabolic panel; Future  -     potassium chloride SA (K-DUR,KLOR-CON) 10 MEQ tablet; Take 1 tablet (10 mEq total) by mouth 2 (two) times daily.    Nausea  Sent medication refill to patient's preferred pharmacy on file.  -     ondansetron (ZOFRAN) 4 MG tablet; Take 1 tablet (4 mg total) by mouth every 8 (eight) hours as needed for Nausea.    Need for influenza vaccination  Counseled regarding seasonal influenza vaccination - administered  -     Influenza - High Dose (65+) (PF) (IM)        Follow up if symptoms worsen or fail to improve.    The patient expressed understanding and no barriers to adherence were identified.     1. The patient indicates understanding of these issues and agrees with the plan. Brief care plan is updated and reviewed with the patient as applicable.     2. The patient is given an After Visit Summary that lists all medications with directions, allergies, orders placed during this encounter and follow-up  instructions.     3. I have reviewed the patient's medical information including past medical, family, and social history sections including the medications and allergies.     4. We discussed the patient's current medications. I reconciled the patient's medication list and prepared and supplied needed refills.       Pantera Rosen MD  Internal Medicine-Pediatrics

## 2019-11-04 NOTE — PATIENT INSTRUCTIONS
Start DOXYCYCLINE in placed of KEFLEX    We will contact you with your lab results (basic metabolic panel/potassium)

## 2019-11-05 DIAGNOSIS — N30.90 CYSTITIS: ICD-10-CM

## 2019-11-05 DIAGNOSIS — L30.9 DERMATITIS: Primary | ICD-10-CM

## 2019-11-05 RX ORDER — TRIAMCINOLONE ACETONIDE 1 MG/G
OINTMENT TOPICAL 2 TIMES DAILY PRN
Qty: 30 G | Refills: 0 | Status: SHIPPED | OUTPATIENT
Start: 2019-11-05 | End: 2019-11-22

## 2019-11-05 RX ORDER — PHENAZOPYRIDINE HYDROCHLORIDE 200 MG/1
200 TABLET, FILM COATED ORAL
Qty: 15 TABLET | Refills: 0 | Status: SHIPPED | OUTPATIENT
Start: 2019-11-05 | End: 2019-11-15

## 2019-11-05 NOTE — TELEPHONE ENCOUNTER
----- Message from Dorothy Osorio sent at 11/5/2019  3:31 PM CST -----  Contact: pt  Can the clinic reply in [facility]: n      Please refill the medication(s) listed below. Please call the patient when the prescription(s) is ready for  at this phone number  [phone]      Medication #1 triamcinolone acetonide 0.1% (KENALOG) 0.1 % cream   Medication #2       Preferred Pharmacy:    [facility] - [place] - [address]  [address] [facility] [address]  [place] [address]  Phone: [phone] Fax: [phone]

## 2019-11-05 NOTE — TELEPHONE ENCOUNTER
Will refill this time. Patient should really have an evaluation by a dermatologist if she is having recurrent rashes. We could consider placing a referral for her. Thanks

## 2019-11-05 NOTE — TELEPHONE ENCOUNTER
Patient states that she has a rash that comes and goes on her shoulder and it itches a lot when she comes.     Please advise..    Thanks,  Mine

## 2019-11-13 DIAGNOSIS — R53.1 FUNCTIONAL WEAKNESS: ICD-10-CM

## 2019-11-13 DIAGNOSIS — C7B.02 METASTATIC MALIGNANT CARCINOID TUMOR TO LIVER: ICD-10-CM

## 2019-11-13 DIAGNOSIS — C7A.8 NEUROENDOCRINE CARCINOMA, UNKNOWN PRIMARY SITE: ICD-10-CM

## 2019-11-13 DIAGNOSIS — I1A.0 RESISTANT HYPERTENSION: Chronic | ICD-10-CM

## 2019-11-13 DIAGNOSIS — G89.4 CHRONIC PAIN SYNDROME: Primary | ICD-10-CM

## 2019-11-15 ENCOUNTER — TELEPHONE (OUTPATIENT)
Dept: FAMILY MEDICINE | Facility: CLINIC | Age: 69
End: 2019-11-15

## 2019-11-15 DIAGNOSIS — M25.511 ACUTE PAIN OF RIGHT SHOULDER: Primary | ICD-10-CM

## 2019-11-15 RX ORDER — CEPHALEXIN 500 MG/1
500 CAPSULE ORAL 4 TIMES DAILY
Qty: 40 CAPSULE | Refills: 1 | OUTPATIENT
Start: 2019-11-15 | End: 2019-11-25

## 2019-11-15 RX ORDER — MELOXICAM 7.5 MG/1
7.5 TABLET ORAL DAILY PRN
Qty: 15 TABLET | Refills: 0 | Status: SHIPPED | OUTPATIENT
Start: 2019-11-15 | End: 2019-11-18

## 2019-11-15 NOTE — TELEPHONE ENCOUNTER
Sent MOBIC to patient's preferred pharmacy on file for limited supply pending re-evaluation on 11/19  Will evaluate symptoms of cough/rhinorrhea if continuing/unimproved with conservative and OTC treatments at clinic visit as well

## 2019-11-15 NOTE — TELEPHONE ENCOUNTER
Pt. Came in today for Flu vaccine, not given. Pt. Reports being sick. Has runny nose, dry cough. Also complaining of right shoulder swollen ,stiffness and painful to touch. Can not lift arm.pt. Requesting something called in. Appt. Made for 11/19/19

## 2019-11-16 DIAGNOSIS — R60.0 LOWER EXTREMITY EDEMA: ICD-10-CM

## 2019-11-18 ENCOUNTER — OFFICE VISIT (OUTPATIENT)
Dept: FAMILY MEDICINE | Facility: CLINIC | Age: 69
End: 2019-11-18
Payer: MEDICARE

## 2019-11-18 ENCOUNTER — HOSPITAL ENCOUNTER (OUTPATIENT)
Facility: HOSPITAL | Age: 69
Discharge: HOME | End: 2019-11-19
Attending: EMERGENCY MEDICINE | Admitting: EMERGENCY MEDICINE
Payer: MEDICARE

## 2019-11-18 VITALS
HEIGHT: 66 IN | HEART RATE: 103 BPM | OXYGEN SATURATION: 95 % | WEIGHT: 181.75 LBS | SYSTOLIC BLOOD PRESSURE: 150 MMHG | TEMPERATURE: 99 F | BODY MASS INDEX: 29.21 KG/M2 | DIASTOLIC BLOOD PRESSURE: 90 MMHG

## 2019-11-18 DIAGNOSIS — M25.511 CHRONIC RIGHT SHOULDER PAIN: ICD-10-CM

## 2019-11-18 DIAGNOSIS — C7B.02 METASTATIC MALIGNANT CARCINOID TUMOR TO LIVER: ICD-10-CM

## 2019-11-18 DIAGNOSIS — R10.9 ACUTE RIGHT FLANK PAIN: ICD-10-CM

## 2019-11-18 DIAGNOSIS — C7B.00 METASTATIC CARCINOID TUMOR: Chronic | ICD-10-CM

## 2019-11-18 DIAGNOSIS — G89.29 CHRONIC RIGHT SHOULDER PAIN: ICD-10-CM

## 2019-11-18 DIAGNOSIS — R10.9 ABDOMINAL PAIN, UNSPECIFIED ABDOMINAL LOCATION: ICD-10-CM

## 2019-11-18 DIAGNOSIS — R07.9 CHEST PAIN, UNSPECIFIED TYPE: Primary | ICD-10-CM

## 2019-11-18 DIAGNOSIS — E87.6 HYPOKALEMIA: ICD-10-CM

## 2019-11-18 DIAGNOSIS — M25.511 RIGHT SHOULDER PAIN: ICD-10-CM

## 2019-11-18 DIAGNOSIS — R10.9 ACUTE ABDOMINAL PAIN: Primary | ICD-10-CM

## 2019-11-18 DIAGNOSIS — I10 ESSENTIAL HYPERTENSION: Chronic | ICD-10-CM

## 2019-11-18 DIAGNOSIS — N30.01 ACUTE CYSTITIS WITH HEMATURIA: ICD-10-CM

## 2019-11-18 DIAGNOSIS — R19.7 DIARRHEA, UNSPECIFIED TYPE: ICD-10-CM

## 2019-11-18 DIAGNOSIS — N20.0 NEPHROLITHIASIS: ICD-10-CM

## 2019-11-18 LAB
ALBUMIN SERPL BCP-MCNC: 3.6 G/DL (ref 3.5–5.2)
ALP SERPL-CCNC: 105 U/L (ref 55–135)
ALT SERPL W/O P-5'-P-CCNC: 14 U/L (ref 10–44)
ANION GAP SERPL CALC-SCNC: 10 MMOL/L (ref 8–16)
AST SERPL-CCNC: 17 U/L (ref 10–40)
BACTERIA #/AREA URNS HPF: ABNORMAL /HPF
BASOPHILS # BLD AUTO: 0.05 K/UL (ref 0–0.2)
BASOPHILS NFR BLD: 1 % (ref 0–1.9)
BILIRUB SERPL-MCNC: 0.7 MG/DL (ref 0.1–1)
BILIRUB UR QL STRIP: ABNORMAL
BUN SERPL-MCNC: 15 MG/DL (ref 8–23)
CALCIUM SERPL-MCNC: 9.9 MG/DL (ref 8.7–10.5)
CHLORIDE SERPL-SCNC: 102 MMOL/L (ref 95–110)
CLARITY UR: ABNORMAL
CO2 SERPL-SCNC: 29 MMOL/L (ref 23–29)
COLOR UR: ABNORMAL
CREAT SERPL-MCNC: 1.3 MG/DL (ref 0.5–1.4)
DIFFERENTIAL METHOD: ABNORMAL
EOSINOPHIL # BLD AUTO: 0.1 K/UL (ref 0–0.5)
EOSINOPHIL NFR BLD: 1.8 % (ref 0–8)
ERYTHROCYTE [DISTWIDTH] IN BLOOD BY AUTOMATED COUNT: 14.5 % (ref 11.5–14.5)
EST. GFR  (AFRICAN AMERICAN): 49 ML/MIN/1.73 M^2
EST. GFR  (NON AFRICAN AMERICAN): 43 ML/MIN/1.73 M^2
GLUCOSE SERPL-MCNC: 89 MG/DL (ref 70–110)
GLUCOSE UR QL STRIP: NEGATIVE
HCT VFR BLD AUTO: 36.5 % (ref 37–48.5)
HGB BLD-MCNC: 11.2 G/DL (ref 12–16)
HGB UR QL STRIP: NEGATIVE
HYALINE CASTS #/AREA URNS LPF: 0 /LPF
IMM GRANULOCYTES # BLD AUTO: 0.01 K/UL (ref 0–0.04)
IMM GRANULOCYTES NFR BLD AUTO: 0.2 % (ref 0–0.5)
KETONES UR QL STRIP: ABNORMAL
LACTATE SERPL-SCNC: 1.4 MMOL/L (ref 0.5–2.2)
LEUKOCYTE ESTERASE UR QL STRIP: ABNORMAL
LIPASE SERPL-CCNC: 8 U/L (ref 4–60)
LYMPHOCYTES # BLD AUTO: 0.9 K/UL (ref 1–4.8)
LYMPHOCYTES NFR BLD: 17.9 % (ref 18–48)
MAGNESIUM SERPL-MCNC: 2.5 MG/DL (ref 1.6–2.6)
MCH RBC QN AUTO: 26.7 PG (ref 27–31)
MCHC RBC AUTO-ENTMCNC: 30.7 G/DL (ref 32–36)
MCV RBC AUTO: 87 FL (ref 82–98)
MICROSCOPIC COMMENT: ABNORMAL
MONOCYTES # BLD AUTO: 0.5 K/UL (ref 0.3–1)
MONOCYTES NFR BLD: 9.1 % (ref 4–15)
NEUTROPHILS # BLD AUTO: 3.5 K/UL (ref 1.8–7.7)
NEUTROPHILS NFR BLD: 70 % (ref 38–73)
NITRITE UR QL STRIP: POSITIVE
NRBC BLD-RTO: 0 /100 WBC
PH UR STRIP: 7 [PH] (ref 5–8)
PLATELET # BLD AUTO: 202 K/UL (ref 150–350)
PLATELET BLD QL SMEAR: ABNORMAL
PMV BLD AUTO: 10.2 FL (ref 9.2–12.9)
POTASSIUM SERPL-SCNC: 3.4 MMOL/L (ref 3.5–5.1)
PROT SERPL-MCNC: 7.7 G/DL (ref 6–8.4)
PROT UR QL STRIP: ABNORMAL
RBC # BLD AUTO: 4.19 M/UL (ref 4–5.4)
RBC #/AREA URNS HPF: 0 /HPF (ref 0–4)
SODIUM SERPL-SCNC: 141 MMOL/L (ref 136–145)
SP GR UR STRIP: 1.02 (ref 1–1.03)
TROPONIN I SERPL DL<=0.01 NG/ML-MCNC: 0.01 NG/ML (ref 0–0.03)
TROPONIN I SERPL DL<=0.01 NG/ML-MCNC: 0.02 NG/ML (ref 0–0.03)
TROPONIN I SERPL DL<=0.01 NG/ML-MCNC: 0.02 NG/ML (ref 0–0.03)
TSH SERPL DL<=0.005 MIU/L-ACNC: 0.97 UIU/ML (ref 0.4–4)
URN SPEC COLLECT METH UR: ABNORMAL
UROBILINOGEN UR STRIP-ACNC: NEGATIVE EU/DL
WBC # BLD AUTO: 5.04 K/UL (ref 3.9–12.7)
WBC #/AREA URNS HPF: 30 /HPF (ref 0–5)

## 2019-11-18 PROCEDURE — 85025 COMPLETE CBC W/AUTO DIFF WBC: CPT | Mod: HCNC

## 2019-11-18 PROCEDURE — 3078F PR MOST RECENT DIASTOLIC BLOOD PRESSURE < 80 MM HG: ICD-10-PCS | Mod: HCNC,CPTII,S$GLB, | Performed by: INTERNAL MEDICINE

## 2019-11-18 PROCEDURE — 3078F DIAST BP <80 MM HG: CPT | Mod: HCNC,CPTII,S$GLB, | Performed by: INTERNAL MEDICINE

## 2019-11-18 PROCEDURE — 1101F PT FALLS ASSESS-DOCD LE1/YR: CPT | Mod: HCNC,CPTII,S$GLB, | Performed by: INTERNAL MEDICINE

## 2019-11-18 PROCEDURE — 3077F SYST BP >= 140 MM HG: CPT | Mod: HCNC,CPTII,S$GLB, | Performed by: INTERNAL MEDICINE

## 2019-11-18 PROCEDURE — 25000003 PHARM REV CODE 250: Mod: HCNC | Performed by: PHYSICIAN ASSISTANT

## 2019-11-18 PROCEDURE — 63600175 PHARM REV CODE 636 W HCPCS: Mod: HCNC | Performed by: EMERGENCY MEDICINE

## 2019-11-18 PROCEDURE — 25000003 PHARM REV CODE 250: Mod: HCNC | Performed by: EMERGENCY MEDICINE

## 2019-11-18 PROCEDURE — 1101F PR PT FALLS ASSESS DOC 0-1 FALLS W/OUT INJ PAST YR: ICD-10-PCS | Mod: HCNC,CPTII,S$GLB, | Performed by: INTERNAL MEDICINE

## 2019-11-18 PROCEDURE — 81000 URINALYSIS NONAUTO W/SCOPE: CPT | Mod: HCNC

## 2019-11-18 PROCEDURE — 93010 ELECTROCARDIOGRAM REPORT: CPT | Mod: HCNC,,, | Performed by: INTERNAL MEDICINE

## 2019-11-18 PROCEDURE — 99215 PR OFFICE/OUTPT VISIT, EST, LEVL V, 40-54 MIN: ICD-10-PCS | Mod: HCNC,S$GLB,, | Performed by: INTERNAL MEDICINE

## 2019-11-18 PROCEDURE — G0378 HOSPITAL OBSERVATION PER HR: HCPCS | Mod: HCNC

## 2019-11-18 PROCEDURE — 96374 THER/PROPH/DIAG INJ IV PUSH: CPT | Mod: HCNC

## 2019-11-18 PROCEDURE — 99999 PR PBB SHADOW E&M-EST. PATIENT-LVL III: CPT | Mod: PBBFAC,HCNC,, | Performed by: INTERNAL MEDICINE

## 2019-11-18 PROCEDURE — 87086 URINE CULTURE/COLONY COUNT: CPT | Mod: HCNC

## 2019-11-18 PROCEDURE — 93005 ELECTROCARDIOGRAM TRACING: CPT | Mod: HCNC

## 2019-11-18 PROCEDURE — 80053 COMPREHEN METABOLIC PANEL: CPT | Mod: HCNC

## 2019-11-18 PROCEDURE — 83605 ASSAY OF LACTIC ACID: CPT | Mod: HCNC

## 2019-11-18 PROCEDURE — 99215 OFFICE O/P EST HI 40 MIN: CPT | Mod: HCNC,S$GLB,, | Performed by: INTERNAL MEDICINE

## 2019-11-18 PROCEDURE — 84443 ASSAY THYROID STIM HORMONE: CPT | Mod: HCNC

## 2019-11-18 PROCEDURE — 93010 EKG 12-LEAD: ICD-10-PCS | Mod: HCNC,,, | Performed by: INTERNAL MEDICINE

## 2019-11-18 PROCEDURE — 96376 TX/PRO/DX INJ SAME DRUG ADON: CPT | Mod: HCNC

## 2019-11-18 PROCEDURE — 83690 ASSAY OF LIPASE: CPT | Mod: HCNC

## 2019-11-18 PROCEDURE — 83735 ASSAY OF MAGNESIUM: CPT | Mod: HCNC

## 2019-11-18 PROCEDURE — 99285 EMERGENCY DEPT VISIT HI MDM: CPT | Mod: 25,HCNC

## 2019-11-18 PROCEDURE — 87186 SC STD MICRODIL/AGAR DIL: CPT | Mod: HCNC

## 2019-11-18 PROCEDURE — 87077 CULTURE AEROBIC IDENTIFY: CPT | Mod: HCNC

## 2019-11-18 PROCEDURE — 99999 PR PBB SHADOW E&M-EST. PATIENT-LVL III: ICD-10-PCS | Mod: PBBFAC,HCNC,, | Performed by: INTERNAL MEDICINE

## 2019-11-18 PROCEDURE — 36415 COLL VENOUS BLD VENIPUNCTURE: CPT | Mod: HCNC

## 2019-11-18 PROCEDURE — 84484 ASSAY OF TROPONIN QUANT: CPT | Mod: 91,HCNC

## 2019-11-18 PROCEDURE — 3077F PR MOST RECENT SYSTOLIC BLOOD PRESSURE >= 140 MM HG: ICD-10-PCS | Mod: HCNC,CPTII,S$GLB, | Performed by: INTERNAL MEDICINE

## 2019-11-18 PROCEDURE — 87088 URINE BACTERIA CULTURE: CPT | Mod: HCNC

## 2019-11-18 RX ORDER — CLONIDINE HYDROCHLORIDE 0.1 MG/1
0.1 TABLET ORAL EVERY 8 HOURS PRN
Status: DISCONTINUED | OUTPATIENT
Start: 2019-11-18 | End: 2019-11-19

## 2019-11-18 RX ORDER — MORPHINE SULFATE 10 MG/ML
4 INJECTION INTRAMUSCULAR; INTRAVENOUS; SUBCUTANEOUS
Status: COMPLETED | OUTPATIENT
Start: 2019-11-18 | End: 2019-11-18

## 2019-11-18 RX ORDER — CEPHALEXIN 500 MG/1
500 CAPSULE ORAL EVERY 8 HOURS
Status: DISCONTINUED | OUTPATIENT
Start: 2019-11-18 | End: 2019-11-19

## 2019-11-18 RX ORDER — BUMETANIDE 0.5 MG/1
0.5 TABLET ORAL DAILY PRN
Qty: 30 TABLET | Refills: 2 | Status: SHIPPED | OUTPATIENT
Start: 2019-11-18 | End: 2020-02-08

## 2019-11-18 RX ORDER — SODIUM CHLORIDE 0.9 % (FLUSH) 0.9 %
10 SYRINGE (ML) INJECTION
Status: DISCONTINUED | OUTPATIENT
Start: 2019-11-18 | End: 2019-11-19

## 2019-11-18 RX ORDER — CEPHALEXIN 250 MG/1
500 CAPSULE ORAL
Status: COMPLETED | OUTPATIENT
Start: 2019-11-18 | End: 2019-11-18

## 2019-11-18 RX ORDER — MORPHINE SULFATE 10 MG/ML
6 INJECTION INTRAMUSCULAR; INTRAVENOUS; SUBCUTANEOUS
Status: COMPLETED | OUTPATIENT
Start: 2019-11-18 | End: 2019-11-18

## 2019-11-18 RX ORDER — OXYCODONE HYDROCHLORIDE 5 MG/1
15 TABLET ORAL EVERY 6 HOURS PRN
Status: DISCONTINUED | OUTPATIENT
Start: 2019-11-18 | End: 2019-11-19

## 2019-11-18 RX ADMIN — OXYCODONE HYDROCHLORIDE 15 MG: 5 TABLET ORAL at 10:11

## 2019-11-18 RX ADMIN — MORPHINE SULFATE 4 MG: 10 INJECTION INTRAVENOUS at 01:11

## 2019-11-18 RX ADMIN — CLONIDINE HYDROCHLORIDE 0.1 MG: 0.1 TABLET ORAL at 10:11

## 2019-11-18 RX ADMIN — CEPHALEXIN 500 MG: 500 CAPSULE ORAL at 10:11

## 2019-11-18 RX ADMIN — CEPHALEXIN 500 MG: 250 CAPSULE ORAL at 06:11

## 2019-11-18 RX ADMIN — MORPHINE SULFATE 6 MG: 10 INJECTION INTRAVENOUS at 06:11

## 2019-11-18 NOTE — PROGRESS NOTES
Subjective:     Chief Complaint  Chief Complaint   Patient presents with    Abdominal Pain     patient c/o abd/shoulder/ and weakness x 1 week    Shoulder Pain    Fatigue       JENNY Domingo is a 69 y.o. female with medical diagnoses as listed in the medical history and problem list that presents for above complaint(s).  Last clinic visit was 11/4/2019.      Nausea/vomiting/abdominal pain intermittently for the past several days. Significant diarrhea notable as well.  She is having severe dificulty getting herself to the bathroom. Last ate a piece of chicken yestrday and noted episode of emesis. No appetite. Taking anti-emetic therapy - last this morning (Zofran 8 mg tablet). She also took Mobic this morning as well as two doses of Lomotil for severe, non-bloody diarrhea.     Continued R shoulder pain and difficulty with range of motion as well - she hasn't had any imaging of note recently and has been attempting to utilize Mobic for joint pain without relief    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)        PAST SURGICAL HISTORY:  Past Surgical History:   Procedure Laterality Date    CATARACT EXTRACTION Left 10/2017    CHOLECYSTECTOMY      COLON SURGERY      cystoscope      CYSTOSCOPY W/ RETROGRADES Right 10/10/2019    Procedure: CYSTOSCOPY, WITH RETROGRADE PYELOGRAM;  Surgeon: Gen Isbell MD;  Location: [facility];  Service: Urology;  Laterality: Right;    EYE SURGERY       HYSTERECTOMY  5/1996    LITHOTRIPSY      LIVER BIOPSY  9/14    carcinoid    URETEROSCOPY Right 10/10/2019    Procedure: URETEROSCOPY;  Surgeon: Gen Isbell MD;  Location: [facility];  Service: Urology;  Laterality: Right;       SOCIAL HISTORY:  Social History     Socioeconomic History    Marital status:      Spouse name: Not on file    Number of children: Not on file    Years of education: Not on file    Highest education level: Not on file   Occupational History    Occupation: disabled    Social Needs    Financial resource strain: Not on file    Food insecurity:     Worry: Not on file     Inability: Not on file    Transportation needs:     Medical: Not on file     Non-medical: Not on file   Tobacco Use    Smoking status: Never Smoker    Smokeless tobacco: Never Used   Substance and Sexual Activity    Alcohol use: No    Drug use: No    Sexual activity: Not Currently   Lifestyle    Physical activity:     Days per week: Not on file     Minutes per session: Not on file    Stress: Not on file   Relationships    Social connections:     Talks on phone: Not on file     Gets together: Not on file     Attends Rastafarian service: Not on file     Active member of club or organization: Not on file     Attends meetings of clubs or organizations: Not on file     Relationship status: Not on file   Other Topics Concern    Not on file   Social History Narrative    Not on file       FAMILY HISTORY:  Family History   Problem Relation Age of Onset    Cancer Mother         unknown    Alzheimer's disease Father     Stroke Sister     No Known Problems Son     No Known Problems Son     No Known Problems Son     No Known Problems Son     Kidney disease Neg Hx        ALLERGIES AND MEDICATIONS: updated and reviewed.  Review of patient's allergies indicates:   Allergen Reactions    Epinephrine Anaphylaxis     Can cause  a Carcinoid Crisis    Contrast media Hives, Itching and Swelling     Ibuprofen Hives, Itching and Swelling    Iodinated contrast media     Sulfa (sulfonamide antibiotics) Hives, Itching and Swelling     Current Outpatient Medications   Medication Sig Dispense Refill    acetaminophen (TYLENOL) 325 MG tablet Take 1 tablet (325 mg total) by mouth every 6 (six) hours as needed for Pain.      bumetanide (BUMEX) 0.5 MG Tab Take 1 tablet (0.5 mg total) by mouth daily as needed. 30 tablet 2    cloNIDine (CATAPRES) 0.1 MG tablet TAKE ONE TABLET BY MOUTH THREE TIMES DAILY AS NEEDED FOR signs of withdrawal  1    cyanocobalamin (VITAMIN B-12) 1000 MCG tablet Take 1 tablet (1,000 mcg total) by mouth once daily. 30 tablet 5    diclofenac sodium (VOLTAREN) 1 % Gel Apply the gel (2 g) to the affected area 4 times daily. Do not apply more than 8 g daily to any one affected joint of the upper extremities. 100 g 1    diphenoxylate-atropine 2.5-0.025 mg (LOMOTIL) 2.5-0.025 mg per tablet Take 1 tablet by mouth 4 (four) times daily as needed for Diarrhea (for episodic diarrhea). 30 tablet 0    ferrous sulfate (FEOSOL) 325 mg (65 mg iron) Tab tablet TAKE ONE TABLET BY MOUTH THREE TIMES PER WEEK 12 tablet 2    losartan (COZAAR) 50 MG tablet Take 1 tablet (50 mg total) by mouth once daily. 30 tablet 2    magnesium oxide (MAG-OX) 400 mg (241.3 mg magnesium) tablet Take 1 tablet (400 mg total) by mouth once daily. 30 tablet 2    metoprolol tartrate (LOPRESSOR) 50 MG tablet Take 1 tablet (50 mg total) by mouth 2 (two) times daily. 180 tablet 0    ondansetron (ZOFRAN) 4 MG tablet Take 1 tablet (4 mg total) by mouth every 8 (eight) hours as needed for Nausea. 30 tablet 0    oxyCODONE (ROXICODONE) 15 MG Tab TK 1 T PO  Q 4 H prn  0    potassium chloride SA (K-DUR,KLOR-CON) 10 MEQ tablet Take 1 tablet (10 mEq total) by mouth 2 (two) times daily. 60 tablet 0    promethazine (PHENERGAN) 12.5 MG Tab Take 2 tablets (25 mg total) by mouth every 6 (six) hours as needed. 30 tablet 2    tamsulosin  "(FLOMAX) 0.4 mg Cap Take 1 capsule (0.4 mg total) by mouth every evening. 30 capsule 0    triamcinolone acetonide 0.1% (KENALOG) 0.1 % ointment Apply topically 2 (two) times daily as needed. 30 g 0    trolamine salicylate (ASPERCREME) 10 % cream Apply topically as needed.       No current facility-administered medications for this visit.          ROS:  Review of Systems   Constitutional: Positive for appetite change. Negative for chills, fatigue and fever.   Gastrointestinal: Positive for abdominal pain, diarrhea, nausea and vomiting.   Musculoskeletal: Positive for arthralgias (R shoulder pain).   Skin: Negative.        Objective:       Physical Exam  Vitals:    11/18/19 1032   BP: (!) 150/90   BP Location: Left arm   Patient Position: Sitting   BP Method: Medium (Manual)   Pulse: 103   Temp: 99 °F (37.2 °C)   TempSrc: Oral   SpO2: 95%   Weight: 82.5 kg (181 lb 12.3 oz)   Height: 5' 6" (1.676 m)    Body mass index is 29.34 kg/m².  Weight: 82.5 kg (181 lb 12.3 oz)   Height: 5' 6" (167.6 cm)   Physical Exam   Constitutional: She appears well-developed. No distress.   HENT:   Head: Normocephalic and atraumatic.   Mouth/Throat: Oropharynx is clear and moist.   Eyes: Pupils are equal, round, and reactive to light. Conjunctivae and EOM are normal.   Neck: Normal range of motion. Neck supple. No thyromegaly present.   Cardiovascular: Normal rate, normal heart sounds and intact distal pulses.   No murmur heard.  Pulmonary/Chest: Effort normal and breath sounds normal.   Abdominal: She exhibits no mass. There is tenderness (RLQ). There is guarding.   Hyperactive bowel sounds   Musculoskeletal: She exhibits no edema or deformity.   Lymphadenopathy:     She has no cervical adenopathy.   Neurological: She is alert. No cranial nerve deficit.   Skin: Skin is warm and dry.   Psychiatric: She has a normal mood and affect. Her behavior is normal.   Vitals reviewed.      Assessment:     1. Acute abdominal pain    2. Acute right " flank pain    3. Subacute right shoulder pain    4. Nephrolithiasis    5. 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      Plan:     Patito was seen today for abdominal pain, shoulder pain and fatigue.    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 Merits urgent evaluation in the emergency department for consideration of lab evaluation/imaging to determine etiology of symptoms  Notably patient also following with Pain Management in the setting of severe chronic pain (Dr Aquilino Gerber) - last oxycodone refill 10/23/19 (#180)    Nephrolithiasis  History of recurrent nephrolithiasis - had been following with Urology closely      Patient's son has been notified that she is being directed to the Emergency Department    The patient expressed understanding and no barriers to adherence were identified.     1. The patient indicates understanding of these issues and agrees with the plan. Brief care plan is updated and reviewed with the patient as applicable.     2. The patient is given an After Visit Summary that lists all medications with directions, allergies, orders placed during this encounter and follow-up instructions.     3. I have reviewed the patient's medical information including past medical, family, and social history sections including the medications and allergies.     4. We discussed the patient's current medications. I reconciled the patient's medication list and prepared and supplied needed refills.       Pantera Rosen MD  Internal Medicine-Pediatrics

## 2019-11-18 NOTE — ED PROVIDER NOTES
Encounter Date: 11/18/2019    SCRIBE #1 NOTE: I, Johnny Hernandez, am scribing for, and in the presence of,  Radha Giron MD . I have scribed the following portions of the note - Other sections scribed: HPI, ROS, PE.       History     Chief Complaint   Patient presents with    Abdominal Pain     States she has abd pain and n/v.  coming from clinic    Nausea    Vomiting     69 y.o. female with a PMHx of Hypertension who presents to the Emergency Department with a cc of mostly right-sided sharp constant abdominal pain and right-sided neck and back pain that began 2 days ago.  Patient went to her physician at the clinic who referred her here to the ED for further evaluation.  She describes the abdominal pain as her usual pain that she gets when she has a kidney stone.  Pain is worse when she lays on her side.  She has no urinary complaints otherwise.  She is also complaining of joint pain in multiple joints that is chronic, diarrhea that began 2 days ago, nausea, and vomiting.  She denies any left-sided chest pain, shortness of breath, palpitations.  Patient states that she takes oxycodone at home and is not help with the pain it is time.  She denies any fevers chills.    On review of her chart is noted that he had she has had multiple visits were diarrhea has been the complaint    The history is provided by the patient. No  was used.     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)      Past Surgical History:   Procedure Laterality Date    CATARACT EXTRACTION Left 10/2017    CHOLECYSTECTOMY      COLON SURGERY      cystoscope      CYSTOSCOPY W/ RETROGRADES Right 10/10/2019    Procedure: CYSTOSCOPY, WITH RETROGRADE PYELOGRAM;  Surgeon: Gen Isbell MD;  Location: [facility];  Service: Urology;  Laterality: Right;    EYE SURGERY      HYSTERECTOMY  5/1996    LITHOTRIPSY      LIVER BIOPSY  9/14    carcinoid    URETEROSCOPY Right 10/10/2019    Procedure: URETEROSCOPY;  Surgeon: Gen Isbell MD;  Location: [facility];  Service: Urology;  Laterality: Right;     Family History   Problem Relation Age of Onset    Cancer Mother         unknown    Alzheimer's disease Father     Stroke Sister     No Known Problems Son     No Known Problems Son     No Known Problems Son     No Known Problems Son     Kidney disease Neg Hx      Social History     Tobacco Use    Smoking status: Never Smoker    Smokeless tobacco: Never Used   Substance Use Topics    Alcohol use: No    Drug use: No     Review of Systems   Constitutional: Negative for fever.   HENT: Negative for sore throat.    Respiratory: Negative for shortness of breath.    Cardiovascular: Negative for chest pain.   Gastrointestinal: Positive for abdominal pain, diarrhea, nausea and vomiting.   Genitourinary: Negative for dysuria.   Musculoskeletal: Positive for arthralgias (in the arms) and neck pain. Negative for back pain.   Skin: Negative for rash.   Neurological: Negative for weakness.   Hematological: Does not bruise/bleed easily.       Physical Exam     Initial Vitals [11/18/19 1144]   BP Pulse Resp Temp SpO2   (!) 192/84 70 18 98.4 °F (36.9 °C) 98 %      MAP       --         Physical Exam    Nursing note and vitals reviewed.  Constitutional: She appears well-developed and well-nourished. No distress.   HENT:   Head: Normocephalic.   Eyes: Conjunctivae are normal. No scleral icterus.   Neck: Normal range of motion.  Neck supple.   Cardiovascular: Normal rate, regular rhythm and normal heart sounds.   No murmur heard.  Pulmonary/Chest: Breath sounds normal. No respiratory distress.   Pt has a poorly healed scar on the right side of her chest wall with no tenderness or pain.     Abdominal: Soft. Bowel sounds are normal. She exhibits no distension. There is tenderness (tenderness to palpations). There is no rebound and no guarding.   Patient is tender anywhere you touch her   Musculoskeletal: Normal range of motion. She exhibits no edema.   Neurological: She is alert.   Skin: Skin is warm and dry.   Psychiatric: She has a normal mood and affect. Judgment and thought content normal.      EKG:    Normal sinus rhythm at a rate of 68  QRS complexes are large suggestive of LVH  P waves are also large  T waves normal morphology but different from previous in that V1 is inverted  Prolonged QTc, normal QT      ED Course   Procedures  Labs Reviewed   CBC W/ AUTO DIFFERENTIAL - Abnormal; Notable for the following components:       Result Value    Hemoglobin 11.2 (*)     Hematocrit 36.5 (*)     Mean Corpuscular Hemoglobin 26.7 (*)     Mean Corpuscular Hemoglobin Conc 30.7 (*)     Lymph # 0.9 (*)     Lymph% 17.9 (*)     All other components within normal limits   COMPREHENSIVE METABOLIC PANEL - Abnormal; Notable for the following components:    Potassium 3.4 (*)     eGFR if  49 (*)     eGFR if non  43 (*)     All other components within normal limits   URINALYSIS, REFLEX TO URINE CULTURE - Abnormal; Notable for the following components:    Appearance, UA Cloudy (*)     Protein, UA 2+ (*)     Ketones, UA 1+ (*)     Bilirubin (UA) 1+ (*)     Nitrite, UA Positive (*)     Leukocytes, UA 3+ (*)     All other components within normal limits    Narrative:     Preferred Collection Type->Urine, Clean Catch   URINALYSIS MICROSCOPIC - Abnormal; Notable for the following components:    WBC, UA 30 (*)     Bacteria Many (*)      All other components within normal limits    Narrative:     Preferred Collection Type->Urine, Clean Catch   CULTURE, URINE   LIPASE   LACTIC ACID, PLASMA   TROPONIN I   TSH   MAGNESIUM   TROPONIN I   TROPONIN I        ECG Results          EKG 12-lead (In process)  Result time 11/18/19 13:35:44    In process by Interface, Lab In [facility] (11/18/19 13:35:44)                 Narrative:    Test Reason : M25.511,    Vent. Rate : 068 BPM     Atrial Rate : 068 BPM     P-R Int : 156 ms          QRS Dur : 084 ms      QT Int : 402 ms       P-R-T Axes : 061 032 041 degrees     QTc Int : 427 ms    Normal sinus rhythm with sinus arrhythmia  Minimal voltage criteria for LVH, may be normal variant  Septal infarct (cited on or before 10-OCT-2019)  Abnormal ECG  When compared with ECG of 10-OCT-2019 12:36,  Significant changes have occurred    Referred By: [email or web address]   SELF           Confirmed By:                   In process by Interface, Lab In [facility] (11/18/19 13:26:12)                 Narrative:    Test Reason : M25.511,    Vent. Rate : 068 BPM     Atrial Rate : 068 BPM     P-R Int : 156 ms          QRS Dur : 084 ms      QT Int : 402 ms       P-R-T Axes : 061 032 041 degrees     QTc Int : 427 ms    Normal sinus rhythm with sinus arrhythmia  Minimal voltage criteria for LVH, may be normal variant  Septal infarct (cited on or before 10-OCT-2019)  Abnormal ECG  When compared with ECG of 10-OCT-2019 12:36,  Significant changes have occurred    Referred By: [email or web address]   SELF           Confirmed By:                             Imaging Results          CT Abdomen Pelvis  Without Contrast (Final result)  Result time 11/18/19 19:32:14   Procedure changed from CT Abdomen Without Contrast     Final result by Cory Ulrich MD (11/18/19 19:32:14)                 Impression:      1. Grossly stable appearance of bilateral renal calculi, no findings to suggest obstructive uropathy.  2. Stable intrahepatic biliary dilation, correlation  advised.  3. Liquid stool within the distal bowel, could reflect sequela of diarrheal illness however correlation is needed.  4. Stable right partially calcified mesenteric mass and lobular configuration of the liver in this patient with known malignancy.  5. Additional findings above.      Electronically signed by: Cory Ulrich MD  Date:    11/18/2019  Time:    19:32             Narrative:    EXAMINATION:  CT ABDOMEN PELVIS WITHOUT CONTRAST    CLINICAL HISTORY:  renal stone;    TECHNIQUE:  Low dose axial images, sagittal and coronal reformations were obtained from the lung bases to the pubic symphysis.  Oral contrast was not administered.    COMPARISON:  08/22/2019    FINDINGS:  Images of the lower thorax are remarkable for bilateral dependent atelectasis.    The liver, spleen and adrenal glands are unremarkable.  The gallbladder is surgically absent.  There is intrahepatic biliary dilation, similar to the previous examination without significant extrahepatic dilation.  There is fatty atrophic change of the pancreas without pancreatic ductal dilation.  The stomach is decompressed.  No significant abdominal lymphadenopathy.    There is bilateral nephrolithiasis noting staghorn appearance of calculi.  There is no hydronephrosis.  The bilateral ureters are unremarkable without calculi seen.  The urinary bladder is unremarkable.  The uterus is absent the adnexa is unremarkable.    The distal large bowel is nondistended however fluid-filled, correlation with any history of diarrheal illness recommended.  Surgical change of left hemicolectomy noted.  The small bowel is otherwise grossly unremarkable aside from surgical change.  No focal organized pelvic fluid collection.  Previously identified partially calcified lobular lesion within the left lower quadrant appears stable in size and configuration as compared to the previous examination.    Degenerative changes are noted of the sacroiliac joints and spine.  There is  osteopenia.  No significant inguinal lymphadenopathy.  Surgical changes are noted of the anterior abdominal wall without focal organized fluid collection.                               X-Ray Abdomen Flat And Erect (Final result)  Result time 11/18/19 15:10:57    Final result by Cory Ulrich MD (11/18/19 15:10:57)                 Impression:      1. Nonobstructive bowel gas pattern noting right nephrolithiasis.      Electronically signed by: Cory Ulrich MD  Date:    11/18/2019  Time:    15:10             Narrative:    EXAMINATION:  XR ABDOMEN FLAT AND ERECT    CLINICAL HISTORY:  Abdominal Pain;    TECHNIQUE:  Flat and erect AP views of the abdomen were performed.    COMPARISON:  10/17/2019, CT 08/22/2019    FINDINGS:  Two upright views, 1 supine view.    No significant air-fluid levels on upright view.  Air and stool is seen within the large bowel and projected over the rectum.  No focal dilated small bowel loops.  There is right nephrolithiasis.  No cholelithiasis noting surgical change.  No large volume free air or pneumatosis.  The lower lung zones are grossly clear.  No acute osseous abnormality.  Surgical change projects over the mid abdomen.                                69-year-old female with multiple medical problems as well as multiple surgeries presents with what she describes as pain associated with her kidney stones.  My differential includes nephrolithiasis, ureterolithiasis, UTI, intra-abdominal infection versus a small-bowel obstruction.  Cardiac and pulmonary etiology also possible.  Started the workup with labs and an x-ray with pain control.  Patient's x-ray was overall reassuring with no intra-abdominal acute abnormal findings.  Labs overall reassuring with her troponin although within normal limits is double her usual.  EKG is overall reassuring as well except that she has flipped her T-waves in V1 only.  Repeat troponin also elevated from earlier.  Aspirin was ordered but patient  refused it secondary to she states that she can't take aspirin with her neuroendocrine issue.  The patient also states this is she is alert to ibuprofen.  Patient's urine consistent with a UTI which I think is the reason she is in pain.  Patient is sure that she has a kidney stone and continues to push her more pain medicine.  I have added a CT of her abdomen without contrast to evaluate for kidney stones and there are no ureteral stones noted. Have discussed these findings with the patient.  She will be admitted for an ACS rule out given that her troponins are up trending and her EKG is not the same as previous.  Her heart score today is a 4.  She will also be treated for a UTI.                    Scribe Attestation:   Scribe #1: I performed the above scribed service and the documentation accurately describes the services I performed. I attest to the accuracy of the note.                          Clinical Impression:       ICD-10-CM ICD-9-CM   1. Right shoulder pain M25.511 719.41            Scribe attestation: I, Radha Giron, personally performed the services described in this documentation. All medical record entries made by the scribe were at my direction and in my presence.  I have reviewed the chart and agree that the record reflects my personal performance and is accurate and complete.                 Radha Giron MD  11/18/19 [id number]

## 2019-11-18 NOTE — ED TRIAGE NOTES
"Arrives from clinic to ER via EMS complaining of abdominal pain, n/v/d for last few days. Pt. States "I think it's them stones in my stomach bothering me." Denies any recent injuries. Rates pain 10/10.  "

## 2019-11-19 VITALS
OXYGEN SATURATION: 96 % | HEART RATE: 58 BPM | TEMPERATURE: 98 F | BODY MASS INDEX: 28.53 KG/M2 | SYSTOLIC BLOOD PRESSURE: 152 MMHG | WEIGHT: 177.5 LBS | RESPIRATION RATE: 18 BRPM | HEIGHT: 66 IN | DIASTOLIC BLOOD PRESSURE: 70 MMHG

## 2019-11-19 PROBLEM — N30.00 ACUTE CYSTITIS: Status: ACTIVE | Noted: 2019-11-19

## 2019-11-19 PROBLEM — I10 ESSENTIAL HYPERTENSION: Status: ACTIVE | Noted: 2019-11-19

## 2019-11-19 PROBLEM — D63.8 ANEMIA OF CHRONIC DISEASE: Chronic | Status: ACTIVE | Noted: 2019-11-19

## 2019-11-19 PROBLEM — I10 ESSENTIAL HYPERTENSION: Chronic | Status: ACTIVE | Noted: 2019-11-19

## 2019-11-19 PROBLEM — E87.6 HYPOKALEMIA: Status: ACTIVE | Noted: 2019-11-19

## 2019-11-19 PROBLEM — D63.8 ANEMIA OF CHRONIC DISEASE: Status: ACTIVE | Noted: 2019-11-19

## 2019-11-19 LAB
C DIFF GDH STL QL: NEGATIVE
C DIFF TOX A+B STL QL IA: NEGATIVE
POTASSIUM SERPL-SCNC: 3.6 MMOL/L (ref 3.5–5.1)
TROPONIN I SERPL DL<=0.01 NG/ML-MCNC: 0.02 NG/ML (ref 0–0.03)
WBC #/AREA STL HPF: NORMAL /[HPF]

## 2019-11-19 PROCEDURE — 87449 NOS EACH ORGANISM AG IA: CPT | Mod: HCNC

## 2019-11-19 PROCEDURE — 84484 ASSAY OF TROPONIN QUANT: CPT | Mod: HCNC

## 2019-11-19 PROCEDURE — 87045 FECES CULTURE AEROBIC BACT: CPT | Mod: HCNC

## 2019-11-19 PROCEDURE — 96375 TX/PRO/DX INJ NEW DRUG ADDON: CPT

## 2019-11-19 PROCEDURE — 25000003 PHARM REV CODE 250: Mod: HCNC | Performed by: INTERNAL MEDICINE

## 2019-11-19 PROCEDURE — 63600175 PHARM REV CODE 636 W HCPCS: Mod: HCNC | Performed by: INTERNAL MEDICINE

## 2019-11-19 PROCEDURE — G0378 HOSPITAL OBSERVATION PER HR: HCPCS | Mod: HCNC

## 2019-11-19 PROCEDURE — 87186 SC STD MICRODIL/AGAR DIL: CPT | Mod: HCNC

## 2019-11-19 PROCEDURE — 87427 SHIGA-LIKE TOXIN AG IA: CPT | Mod: 59,HCNC

## 2019-11-19 PROCEDURE — 25000003 PHARM REV CODE 250: Mod: HCNC | Performed by: NURSE PRACTITIONER

## 2019-11-19 PROCEDURE — 87077 CULTURE AEROBIC IDENTIFY: CPT | Mod: HCNC

## 2019-11-19 PROCEDURE — 87046 STOOL CULTR AEROBIC BACT EA: CPT | Mod: 59,HCNC

## 2019-11-19 PROCEDURE — 89055 LEUKOCYTE ASSESSMENT FECAL: CPT | Mod: HCNC

## 2019-11-19 PROCEDURE — 84132 ASSAY OF SERUM POTASSIUM: CPT | Mod: HCNC

## 2019-11-19 PROCEDURE — 25000003 PHARM REV CODE 250: Mod: HCNC | Performed by: PHYSICIAN ASSISTANT

## 2019-11-19 PROCEDURE — 36415 COLL VENOUS BLD VENIPUNCTURE: CPT | Mod: HCNC

## 2019-11-19 RX ORDER — LOSARTAN POTASSIUM 25 MG/1
50 TABLET ORAL DAILY
Status: DISCONTINUED | OUTPATIENT
Start: 2019-11-19 | End: 2019-11-19 | Stop reason: HOSPADM

## 2019-11-19 RX ORDER — ENOXAPARIN SODIUM 100 MG/ML
40 INJECTION SUBCUTANEOUS EVERY 24 HOURS
Status: DISCONTINUED | OUTPATIENT
Start: 2019-11-19 | End: 2019-11-19 | Stop reason: HOSPADM

## 2019-11-19 RX ORDER — CLONIDINE HYDROCHLORIDE 0.1 MG/1
0.1 TABLET ORAL EVERY 8 HOURS PRN
Status: DISCONTINUED | OUTPATIENT
Start: 2019-11-19 | End: 2019-11-19 | Stop reason: HOSPADM

## 2019-11-19 RX ORDER — POTASSIUM CHLORIDE 20 MEQ/1
40 TABLET, EXTENDED RELEASE ORAL ONCE
Status: COMPLETED | OUTPATIENT
Start: 2019-11-19 | End: 2019-11-19

## 2019-11-19 RX ORDER — OXYCODONE HYDROCHLORIDE 5 MG/1
10 TABLET ORAL ONCE
Status: COMPLETED | OUTPATIENT
Start: 2019-11-19 | End: 2019-11-19

## 2019-11-19 RX ORDER — METOPROLOL TARTRATE 50 MG/1
50 TABLET ORAL 2 TIMES DAILY
Status: DISCONTINUED | OUTPATIENT
Start: 2019-11-19 | End: 2019-11-19 | Stop reason: HOSPADM

## 2019-11-19 RX ORDER — GABAPENTIN 100 MG/1
100 CAPSULE ORAL 2 TIMES DAILY
Status: DISCONTINUED | OUTPATIENT
Start: 2019-11-19 | End: 2019-11-19 | Stop reason: HOSPADM

## 2019-11-19 RX ORDER — NITROFURANTOIN 25; 75 MG/1; MG/1
100 CAPSULE ORAL EVERY 12 HOURS
Status: DISCONTINUED | OUTPATIENT
Start: 2019-11-19 | End: 2019-11-19 | Stop reason: HOSPADM

## 2019-11-19 RX ORDER — ACETAMINOPHEN 500 MG
500 TABLET ORAL EVERY 6 HOURS PRN
Status: DISCONTINUED | OUTPATIENT
Start: 2019-11-19 | End: 2019-11-19 | Stop reason: HOSPADM

## 2019-11-19 RX ORDER — NITROFURANTOIN 25; 75 MG/1; MG/1
100 CAPSULE ORAL EVERY 12 HOURS
Qty: 14 CAPSULE | Refills: 0 | Status: SHIPPED | OUTPATIENT
Start: 2019-11-19 | End: 2019-11-22 | Stop reason: ALTCHOICE

## 2019-11-19 RX ORDER — OXYCODONE HYDROCHLORIDE 5 MG/1
5 TABLET ORAL EVERY 6 HOURS PRN
Status: DISCONTINUED | OUTPATIENT
Start: 2019-11-19 | End: 2019-11-19

## 2019-11-19 RX ORDER — TAMSULOSIN HYDROCHLORIDE 0.4 MG/1
0.4 CAPSULE ORAL NIGHTLY
Status: DISCONTINUED | OUTPATIENT
Start: 2019-11-19 | End: 2019-11-19 | Stop reason: HOSPADM

## 2019-11-19 RX ORDER — TALC
9 POWDER (GRAM) TOPICAL NIGHTLY PRN
Status: DISCONTINUED | OUTPATIENT
Start: 2019-11-19 | End: 2019-11-19 | Stop reason: HOSPADM

## 2019-11-19 RX ORDER — ONDANSETRON 2 MG/ML
8 INJECTION INTRAMUSCULAR; INTRAVENOUS EVERY 8 HOURS PRN
Status: DISCONTINUED | OUTPATIENT
Start: 2019-11-19 | End: 2019-11-19 | Stop reason: HOSPADM

## 2019-11-19 RX ADMIN — OXYCODONE HYDROCHLORIDE 15 MG: 5 TABLET ORAL at 05:11

## 2019-11-19 RX ADMIN — OXYCODONE HYDROCHLORIDE 5 MG: 5 TABLET ORAL at 12:11

## 2019-11-19 RX ADMIN — NITROFURANTOIN (MONOHYDRATE/MACROCRYSTALS) 100 MG: 75; 25 CAPSULE ORAL at 09:11

## 2019-11-19 RX ADMIN — CEFTRIAXONE 1 G: 1 INJECTION, SOLUTION INTRAVENOUS at 06:11

## 2019-11-19 RX ADMIN — GABAPENTIN 100 MG: 100 CAPSULE ORAL at 12:11

## 2019-11-19 RX ADMIN — POTASSIUM CHLORIDE 40 MEQ: 1500 TABLET, EXTENDED RELEASE ORAL at 10:11

## 2019-11-19 RX ADMIN — POTASSIUM CHLORIDE 40 MEQ: 1500 TABLET, EXTENDED RELEASE ORAL at 06:11

## 2019-11-19 RX ADMIN — LOSARTAN POTASSIUM 50 MG: 25 TABLET ORAL at 09:11

## 2019-11-19 RX ADMIN — METOPROLOL TARTRATE 50 MG: 50 TABLET ORAL at 09:11

## 2019-11-19 RX ADMIN — OXYCODONE HYDROCHLORIDE 10 MG: 5 TABLET ORAL at 05:11

## 2019-11-19 NOTE — ASSESSMENT & PLAN NOTE
Patient's blood pressure is better-controlled; will continue home regimen of losartan, metoprolol, and tamsulosin, and provide as-needed clonidine.

## 2019-11-19 NOTE — HOSPITAL COURSE
Patito Domingo is a 69 y.o. placed in observation for evaluation of abdominal pain and was found to have acute cystitis/UTI. Review of her previous urine cultures were significant for Escherichia coli resistant to penicillins, cephalosporins, aminoglycosides, and trimethoprim/sulfamethoxazole; pansensitive Proteus mirabilis; Enterobacter aerogenes resistant to cephalosporins, nitrofurantoin, and penicillins; Klebsiella pneumoniae sensitive only to carbapenems and amikacin.  She was started on empiric antibiotic therapy with ceftriaxone. Stool sent negative for cdiff pending urine cultures. Serial troponins negative, lactate acid normal, TSH normal. Abnormal urinalysis. Macrobid. Home in stable condition.

## 2019-11-19 NOTE — PLAN OF CARE
11/19/19 0948   Discharge Assessment   Assessment Type Discharge Planning Assessment   Assessment information obtained from? Medical Record   Prior to hospitilization cognitive status: Alert/Oriented   Prior to hospitalization functional status: Independent   Current cognitive status: Alert/Oriented   Current Functional Status: Independent   Facility Arrived From: home   Lives With significant other   Able to Return to Prior Arrangements yes   Is patient able to care for self after discharge? Yes   Who are your caregiver(s) and their phone number(s)? [facility] [phone]   Patient's perception of discharge disposition home or selfcare   Readmission Within the Last 30 Days no previous admission in last 30 days   Patient currently being followed by outpatient case management? Yes   If yes, name of outpatient case management following: [facility] outpatient case management   Patient currently receives any other outside agency services? No   Equipment Currently Used at Home rollator;shower chair;3-in-1 commode;cane, straight   Do you have any problems affording any of your prescribed medications? No   Is the patient taking medications as prescribed? yes   Does the patient have transportation home? Yes   Transportation Anticipated family or friend will provide   Does the patient receive services at the [facility]mad[facility] Clinic? No   Discharge Plan A Home with family  (with follow up)   DME Needed Upon Discharge  none   Patient/Family in Agreement with Plan yes     [facility] - [place] - [address]  [address]  [place] [address]  Phone: [phone] Fax: [phone]    [id number] - [place] - [address] AT [address] [facility]  [address]  [address]  [place] [address]  Phone: [phone] Fax: [phone]    [id number] - [place] - [address]  [address]  [place] [address]  Phone:  [phone] Fax: [phone]

## 2019-11-19 NOTE — ASSESSMENT & PLAN NOTE
CT-abd/pelvis was relatively benign.  Urinalysis, however, was significant for a specific gravity of 1.020, cloudy appearance, 2+ protein, 1+ ketones, positive for nitrites, 3+ leukocytes, 30 WBCs, and many bacteria.  She does not meet criteria for sepsis.  Review of her previous urine cultures were significant for Escherichia coli resistant to penicillins, cephalosporins, aminoglycosides, and trimethoprim/sulfamethoxazole; pansensitive Proteus mirabilis; Enterobacter aerogenes resistant to cephalosporins, nitrofurantoin, and penicillins; Klebsiella pneumoniae sensitive only to carbapenems and amikacin.  She has been started on empiric antibiotic therapy with ceftriaxone pending urine cultures.  Of note, she was complaining of diarrhea which is likely secondary to antibiotic therapy.  Clostridioides difficile studies are pending.

## 2019-11-19 NOTE — DISCHARGE SUMMARY
[facility] Hospital Medicine  Discharge Summary      Patient Name: Patito Domingo  MRN: [id number]  Admission Date: 11/18/2019  Hospital Length of Stay: 0 days  Discharge Date and Time:  11/19/2019 4:17 PM  Attending Physician: Lakia Lomeli MD   Discharging Provider: BERENICE Ramirez  Primary Care Provider: Pantera Rosen MD      HPI:   Ms. Patito Domingo is a 69 y.o. female with essential hypertension, anemia of chronic disease, and metastatic neuroendocrine tumor who presents to [facility] ED with complaints of abdominal pain for the past three days.  She reports that the pain is infraumbilical without radiation, is dull in quality, and is 9/0 in severity at its worst.  The pain is worse on palpation and better with pain medications.  She reports occasional abdominal pain and a warm flushing feeling occasionally with her neuroendocrine tumor and thinks that she has an exacerbation of that.  She also complains of the nausea and vomiting of yellow bile but denies any blood.  She has had diarrhea which she also acute attributes to her neuroendocrine tumor.  She does, however, complain of dysuria, increased urinary frequency, increased urinary urgency, and hematuria.  She reports frequent urinary tract infections and think that she has one now.  She otherwise denies any chest pains, shortness of breath, palpitations, nor diaphoresis.    * No surgery found *      Hospital Course:   Patito Domingo is a 69 y.o. placed in observation for evaluation of abdominal pain and was found to have acute cystitis/UTI. Review of her previous urine cultures were significant for Escherichia coli resistant to penicillins, cephalosporins, aminoglycosides, and trimethoprim/sulfamethoxazole; pansensitive Proteus mirabilis; Enterobacter aerogenes resistant to cephalosporins, nitrofurantoin, and penicillins; Klebsiella pneumoniae sensitive only to carbapenems and amikacin.  She  was started on empiric  antibiotic therapy with ceftriaxone. Stool sent negative for cdiff pending urine cultures. Serial troponins negative, lactate acid normal, TSH normal. Abnormal urinalysis. Macrobid. Home in stable condition.     Consults:     No new Assessment & Plan notes have been filed under this hospital service since the last note was generated.  Service: Hospital Medicine    Final Active Diagnoses:    Diagnosis Date Noted POA    PRINCIPAL PROBLEM:  Acute cystitis [N30.00] 11/19/2019 Yes    Hypokalemia [E87.6] 11/19/2019 Yes    Essential hypertension [I10] 11/19/2019 Yes     Chronic    Anemia of chronic disease [D63.8] 11/19/2019 Yes     Chronic    Metastatic carcinoid tumor [C7B.00] 01/27/2015 Yes     Chronic      Problems Resolved During this Admission:       Discharged Condition: stable    Disposition: Home or Self Care    Follow Up:  Follow-up Information     [facility] On 11/21/2019.    Specialty:  Radiology  Why:  Please keep appointment scheduled prior to admission to Observation at hospital for November 21st at 8am  Contact information:  [address]  [facility] [id number] [phone]  Additional information:  Check in inside the [facility], Medical Office Building, 1st Floor. Once registered in the [facility], please follow the blue line to radiology on the hospital side.           Chris Herbert MD On 11/25/2019.    Specialty:  General Surgery  Why:  appointment scheduled for November 25th at 11am  Contact information:  [address]  [facility] [address]  [facility] [address]  [phone]             Pantera Rosen MD On 12/3/2019.    Specialties:  Internal Medicine, Pediatrics  Why:  appointment scheduled for December 3rd at 2pm  Contact information:  [address] [facility] [address]  [phone]                 Patient Instructions:      Diet Cardiac     Activity as tolerated       Significant Diagnostic Studies: Labs:   CMP    Recent Labs   Lab 11/18/19  1310 11/19/19  0952     --    K 3.4* 3.6     --    CO2 29  --    GLU 89  --    BUN 15  --    CREATININE 1.3  --    CALCIUM 9.9  --    PROT 7.7  --    ALBUMIN 3.6  --    BILITOT 0.7  --    ALKPHOS 105  --    AST 17  --    ALT 14  --    ANIONGAP 10  --    ESTGFRAFRICA 49*  --    EGFRNONAA 43*  --    , CBC   Recent Labs   Lab 11/18/19  1310   WBC 5.04   HGB 11.2*   HCT 36.5*      , INR   Lab Results   Component Value Date    INR 0.9 11/29/2018    INR 1.0 04/19/2018    INR 1.0 12/30/2017   , Lipid Panel   Lab Results   Component Value Date    CHOL 122 06/26/2019    HDL 75 06/26/2019    LDLCALC 28.2 (L) 06/26/2019    TRIG 94 06/26/2019    CHOLHDL 61.5 (H) 06/26/2019   , Troponin   Recent Labs   Lab 11/19/19  0531   TROPONINI 0.018    and A1C:   Recent Labs   Lab 06/26/19  1155   HGBA1C 6.1*       Pending Diagnostic Studies:     None         Medications:  Reconciled Home Medications:      Medication List      CONTINUE taking these medications    bumetanide 0.5 MG Tab  Commonly known as:  BUMEX  Take 1 tablet (0.5 mg total) by mouth daily as needed.     cloNIDine 0.1 MG tablet  Commonly known as:  CATAPRES  TAKE ONE TABLET BY MOUTH THREE TIMES DAILY AS NEEDED FOR signs of withdrawal     cyanocobalamin 1000 MCG tablet  Commonly known as:  Vitamin B-12  Take 1 tablet (1,000 mcg total) by mouth once daily.     diclofenac sodium 1 % Gel  Commonly known as:  Voltaren  Apply the gel (2 g) to the affected area 4 times daily. Do not apply more than 8 g daily to any one affected joint of the upper extremities.     diphenoxylate-atropine 2.5-0.025 mg 2.5-0.025 mg per tablet  Commonly known as:  LOMOTIL  Take 1 tablet by mouth 4 (four) times daily as needed for Diarrhea (for episodic diarrhea).     ferrous sulfate 325 mg (65 mg iron) Tab tablet  Commonly known as:  FEOSOL  TAKE ONE TABLET BY MOUTH THREE TIMES PER WEEK     losartan 50 MG tablet  Commonly known as:  COZAAR  Take 1 tablet  (50 mg total) by mouth once daily.     magnesium oxide 400 mg (241.3 mg magnesium) tablet  Commonly known as:  MAG-OX  Take 1 tablet (400 mg total) by mouth once daily.     metoprolol tartrate 50 MG tablet  Commonly known as:  LOPRESSOR  Take 1 tablet (50 mg total) by mouth 2 (two) times daily.     ondansetron 4 MG tablet  Commonly known as:  ZOFRAN  Take 1 tablet (4 mg total) by mouth every 8 (eight) hours as needed for Nausea.     oxyCODONE 15 MG Tab  Commonly known as:  ROXICODONE  TK 1 T PO  Q 4 H prn     potassium chloride SA 10 MEQ tablet  Commonly known as:  K-DUR,KLOR-CON  Take 1 tablet (10 mEq total) by mouth 2 (two) times daily.     promethazine 12.5 MG Tab  Commonly known as:  PHENERGAN  Take 2 tablets (25 mg total) by mouth every 6 (six) hours as needed.     tamsulosin 0.4 mg Cap  Commonly known as:  FLOMAX  Take 1 capsule (0.4 mg total) by mouth every evening.     triamcinolone acetonide 0.1% 0.1 % ointment  Commonly known as:  KENALOG  Apply topically 2 (two) times daily as needed.     trolamine salicylate 10 % cream  Commonly known as:  ASPERCREME  Apply topically as needed.        STOP taking these medications    acetaminophen 325 MG tablet  Commonly known as:  TYLENOL            Indwelling Lines/Drains at time of discharge:   Lines/Drains/Airways     None                 Time spent on the discharge of patient: 35 minutes  Patient was seen and examined on the date of discharge and determined to be suitable for discharge.           FELIZ Andrews, FNP-C  Hospitalist - Department of Hospital Medicine  [address] [place] [address]  Office [phone]; Pager [phone]

## 2019-11-19 NOTE — PROGRESS NOTES
WRITTEN HEALTHCARE DISCHARGE INFORMATION      Things that YOU are RESPONSIBLE for to Manage Your Care At Home:     1. Getting your prescriptions filled.  2. Taking you medications as directed. DO NOT MISS ANY DOSES!  3. Going to your follow-up doctor appointments. This is important because it allows the doctor to monitor your progress and to determine if any changes need to be made to your treatment plan.     If you are unable to make your follow up appointments, please call the number listed and reschedule this appointment.      ____________HELP AT HOME____________________     Experiencing any SIGNS or SYMPTOMS: YOU CAN     Schedule a same day appopintment with your Primary Care Doctor or  you can call [facility] Line for 24/7 assistance at [phone]     If you are experience any signs or symptoms that have become severe, Call 911 and come to your nearest Emergency Room.     Thank you for choosing [facility] and allowing us to care for you.   From your care management team:      You should receive a call from [facility] within 48-72 hours to help manage your care after discharge. Please try to make sure that you answer your phone for this important phone call.    Follow-up Information     [facility] On 11/21/2019.    Specialty:  Radiology  Why:  Please keep appointment scheduled prior to admission to Observation at hospital for November 21st at 8am  Contact information:  [address] [place] [id number] [phone]  Additional information:  Check in inside the [facility], Medical Office Building, 1st Floor. Once registered in the [facility], please follow the blue line to radiology on the hospital side.           Chris Herbert MD On 11/25/2019.    Specialty:  General Surgery  Why:  appointment scheduled for November 25th at 11am  Contact information:  [address]  [address]  [place]  [address]  [phone]             Pantera Rosen MD On 12/3/2019.    Specialties:  Internal Medicine, Pediatrics  Why:  appointment scheduled for December 3rd at 2pm  Contact information:  [address]  [facility] [address]  [phone]

## 2019-11-19 NOTE — HPI
Ms. Patito Domingo is a 69 y.o. female with essential hypertension, anemia of chronic disease, and metastatic neuroendocrine tumor who presents to [facility] ED with complaints of abdominal pain for the past three days.  She reports that the pain is infraumbilical without radiation, is dull in quality, and is 9/0 in severity at its worst.  The pain is worse on palpation and better with pain medications.  She reports occasional abdominal pain and a warm flushing feeling occasionally with her neuroendocrine tumor and thinks that she has an exacerbation of that.  She also complains of the nausea and vomiting of yellow bile but denies any blood.  She has had diarrhea which she also acute attributes to her neuroendocrine tumor.  She does, however, complain of dysuria, increased urinary frequency, increased urinary urgency, and hematuria.  She reports frequent urinary tract infections and think that she has one now.  She otherwise denies any chest pains, shortness of breath, palpitations, nor diaphoresis.

## 2019-11-19 NOTE — SUBJECTIVE & OBJECTIVE
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)        Past Surgical History:   Procedure Laterality Date    CATARACT EXTRACTION Left 10/2017    CHOLECYSTECTOMY      COLON SURGERY      cystoscope      CYSTOSCOPY W/ RETROGRADES Right 10/10/2019    Procedure: CYSTOSCOPY, WITH RETROGRADE PYELOGRAM;  Surgeon: Gen Isbell MD;  Location: [facility] OR;  Service: Urology;  Laterality: Right;    EYE SURGERY      HYSTERECTOMY  5/1996    LITHOTRIPSY      LIVER BIOPSY  9/14    carcinoid    URETEROSCOPY Right 10/10/2019    Procedure: URETEROSCOPY;  Surgeon: Gen Isbell MD;  Location: [facility] OR;  Service: Urology;  Laterality: Right;       Review of patient's allergies indicates:   Allergen Reactions    Epinephrine Anaphylaxis     Can cause  a Carcinoid Crisis    Contrast media Hives, Itching and Swelling    Ibuprofen Hives, Itching and Swelling    Iodinated contrast media     Sulfa (sulfonamide antibiotics) Hives, Itching and Swelling       No current facility-administered medications on file prior to encounter.      Current Outpatient Medications on File Prior to Encounter   Medication Sig    cyanocobalamin (VITAMIN B-12) 1000 MCG tablet Take 1 tablet (1,000 mcg total) by mouth once daily.    ferrous sulfate (FEOSOL) 325 mg (65 mg iron) Tab tablet TAKE ONE TABLET BY MOUTH THREE TIMES PER WEEK    losartan (COZAAR) 50 MG tablet Take 1 tablet (50 mg total) by mouth once daily.    metoprolol tartrate (LOPRESSOR) 50 MG tablet Take 1 tablet (50 mg total) by mouth 2 (two) times daily.    oxyCODONE (ROXICODONE) 15 MG Tab TK 1 T PO  Q 4 H prn    potassium chloride SA (K-DUR,KLOR-CON) 10 MEQ tablet Take 1 tablet (10 mEq total) by mouth 2 (two) times daily.    tamsulosin (FLOMAX) 0.4 mg Cap Take 1 capsule  (0.4 mg total) by mouth every evening.    acetaminophen (TYLENOL) 325 MG tablet Take 1 tablet (325 mg total) by mouth every 6 (six) hours as needed for Pain.    bumetanide (BUMEX) 0.5 MG Tab Take 1 tablet (0.5 mg total) by mouth daily as needed.    cloNIDine (CATAPRES) 0.1 MG tablet TAKE ONE TABLET BY MOUTH THREE TIMES DAILY AS NEEDED FOR signs of withdrawal    diclofenac sodium (VOLTAREN) 1 % Gel Apply the gel (2 g) to the affected area 4 times daily. Do not apply more than 8 g daily to any one affected joint of the upper extremities.    diphenoxylate-atropine 2.5-0.025 mg (LOMOTIL) 2.5-0.025 mg per tablet Take 1 tablet by mouth 4 (four) times daily as needed for Diarrhea (for episodic diarrhea).    magnesium oxide (MAG-OX) 400 mg (241.3 mg magnesium) tablet Take 1 tablet (400 mg total) by mouth once daily.    ondansetron (ZOFRAN) 4 MG tablet Take 1 tablet (4 mg total) by mouth every 8 (eight) hours as needed for Nausea.    promethazine (PHENERGAN) 12.5 MG Tab Take 2 tablets (25 mg total) by mouth every 6 (six) hours as needed.    triamcinolone acetonide 0.1% (KENALOG) 0.1 % ointment Apply topically 2 (two) times daily as needed.    trolamine salicylate (ASPERCREME) 10 % cream Apply topically as needed.     Family History     Problem Relation (Age of Onset)    Alzheimer's disease Father    Cancer Mother    No Known Problems Son, Son, Son, Son    Stroke Sister        Tobacco Use    Smoking status: Never Smoker    Smokeless tobacco: Never Used   Substance and Sexual Activity    Alcohol use: No    Drug use: No    Sexual activity: Not Currently     Review of Systems   Constitutional: Positive for chills and fever. Negative for activity change, appetite change, diaphoresis, fatigue and unexpected weight change.   HENT: Negative.    Eyes: Negative.    Respiratory: Negative for cough, chest tightness, shortness of breath and wheezing.    Cardiovascular: Negative for chest pain, palpitations and leg  swelling.   Gastrointestinal: Positive for abdominal pain, diarrhea, nausea and vomiting. Negative for abdominal distention, blood in stool and constipation.   Genitourinary: Negative for dysuria and hematuria.   Musculoskeletal: Negative.    Skin: Negative.    Neurological: Negative for dizziness, seizures, syncope, weakness and light-headedness.   Psychiatric/Behavioral: Negative.      Objective:     Vital Signs (Most Recent):  Temp: 97.5 °F (36.4 °C) (11/19/19 0504)  Pulse: 61 (11/19/19 0504)  Resp: 17 (11/19/19 0504)  BP: 139/61 (11/19/19 0504)  SpO2: 97 % (11/19/19 0504) Vital Signs (24h Range):  Temp:  [97.5 °F (36.4 °C)-99 °F (37.2 °C)] 97.5 °F (36.4 °C)  Pulse:  [] 61  Resp:  [17-25] 17  SpO2:  [95 %-100 %] 97 %  BP: (137-220)/() 139/61     Weight: 80.5 kg (177 lb 7.5 oz)  Body mass index is 28.64 kg/m².    Physical Exam   Constitutional: She is oriented to person, place, and time. She appears well-developed and well-nourished. No distress.   HENT:   Head: Normocephalic and atraumatic.   Right Ear: External ear normal.   Left Ear: External ear normal.   Nose: Nose normal.   Eyes: Right eye exhibits no discharge. Left eye exhibits no discharge.   Neck: Normal range of motion.   Cardiovascular: Normal rate, regular rhythm, normal heart sounds and intact distal pulses. Exam reveals no gallop and no friction rub.   No murmur heard.  Pulmonary/Chest: Effort normal and breath sounds normal. No stridor. No respiratory distress. She has no wheezes. She has no rales. She exhibits no tenderness.   Abdominal:   Soft, nondistended, mildly tender to the infraumbilical area, bowel sounds normal, no rebound or guarding   Musculoskeletal: Normal range of motion. She exhibits no edema.   Neurological: She is alert and oriented to person, place, and time.   Skin: Skin is warm and dry. She is not diaphoretic. No erythema.   Psychiatric: She has a normal mood and affect. Her behavior is normal. Judgment and thought  content normal.   Nursing note and vitals reviewed.          Significant Labs: All pertinent labs within the past 24 hours have been reviewed.    Significant Imaging: I have reviewed and interpreted all pertinent imaging results/findings within the past 24 hours.

## 2019-11-19 NOTE — H&P
[facility] Medicine  History & Physical    Patient Name: Patito Domingo  MRN: [id number]  Admission Date: 11/18/2019  Attending Physician: Princess Ojeda MD   Primary Care Provider: Pantera Rosen MD         Patient information was obtained from patient.     Subjective:     Principal Problem:Acute cystitis    Chief Complaint: Abdominal pain for three days.    HPI: Ms. Patito Domingo is a 69 y.o. female with essential hypertension, anemia of chronic disease, and metastatic neuroendocrine tumor who presents to [facility] ED with complaints of abdominal pain for the past three days.  She reports that the pain is infraumbilical without radiation, is dull in quality, and is 9/0 in severity at its worst.  The pain is worse on palpation and better with pain medications.  She reports occasional abdominal pain and a warm flushing feeling occasionally with her neuroendocrine tumor and thinks that she has an exacerbation of that.  She also complains of the nausea and vomiting of yellow bile but denies any blood.  She has had diarrhea which she also acute attributes to her neuroendocrine tumor.  She does, however, complain of dysuria, increased urinary frequency, increased urinary urgency, and hematuria.  She reports frequent urinary tract infections and think that she has one now.  She otherwise denies any chest pains, shortness of breath, palpitations, nor diaphoresis.    Chart Review:  Previous Hospitalizations  Date Hospital Diagnosis   Oct 2019 [place] Cystoscopy, ureteroscopy   Dec 2018 [facility] Acute renal failure, uremic encephalopathy   Nov 2018 [facility] Transarterial chemoembolization   Jun 2018 [place] UTI due Escherichia coli   Apr 2018 [place] Acute renal failure, UTI due to ESBL Klebsiella and Escherichia coli   Feb 2018 [facility] Intractable abdominal pain, UTI   Jan 2018 [facility] Intractable nausea and vomiting   Dec 2017 [facility] Open cholecystectomy      Outpatient Follow-Up  Date of Visit Physician Service   Nov 2019 Pantera Rosen MD Primary Care   Oct 2019 SASHA Herbert MD General Surgery   Sept 2019 Lara Devries NP Neurology   Jul 2019 Char Robbins MD Nephrology     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)        Past Surgical History:   Procedure Laterality Date    CATARACT EXTRACTION Left 10/2017    CHOLECYSTECTOMY      COLON SURGERY      cystoscope      CYSTOSCOPY W/ RETROGRADES Right 10/10/2019    Procedure: CYSTOSCOPY, WITH RETROGRADE PYELOGRAM;  Surgeon: Gen Isbell MD;  Location: [facility] OR;  Service: Urology;  Laterality: Right;    EYE SURGERY      HYSTERECTOMY  5/1996    LITHOTRIPSY      LIVER BIOPSY  9/14    carcinoid    URETEROSCOPY Right 10/10/2019    Procedure: URETEROSCOPY;  Surgeon: Gen Isbell MD;  Location: [facility] OR;  Service: Urology;  Laterality: Right;       Review of patient's allergies indicates:   Allergen Reactions    Epinephrine Anaphylaxis     Can cause  a Carcinoid Crisis    Contrast media Hives, Itching and Swelling    Ibuprofen Hives, Itching and Swelling    Iodinated contrast media     Sulfa (sulfonamide antibiotics) Hives, Itching and Swelling       No current facility-administered medications on file prior to encounter.      Current Outpatient Medications on File Prior to Encounter   Medication Sig    cyanocobalamin (VITAMIN B-12) 1000 MCG tablet Take 1 tablet (1,000 mcg total) by mouth once daily.    ferrous sulfate (FEOSOL) 325 mg (65 mg iron) Tab tablet TAKE ONE TABLET BY MOUTH THREE TIMES PER WEEK    losartan (COZAAR) 50 MG tablet Take 1 tablet (50 mg total) by mouth once daily.    metoprolol tartrate (LOPRESSOR) 50 MG tablet Take 1 tablet (50 mg total) by mouth 2 (two)  times daily.    oxyCODONE (ROXICODONE) 15 MG Tab TK 1 T PO  Q 4 H prn    potassium chloride SA (K-DUR,KLOR-CON) 10 MEQ tablet Take 1 tablet (10 mEq total) by mouth 2 (two) times daily.    tamsulosin (FLOMAX) 0.4 mg Cap Take 1 capsule (0.4 mg total) by mouth every evening.    acetaminophen (TYLENOL) 325 MG tablet Take 1 tablet (325 mg total) by mouth every 6 (six) hours as needed for Pain.    bumetanide (BUMEX) 0.5 MG Tab Take 1 tablet (0.5 mg total) by mouth daily as needed.    cloNIDine (CATAPRES) 0.1 MG tablet TAKE ONE TABLET BY MOUTH THREE TIMES DAILY AS NEEDED FOR signs of withdrawal    diclofenac sodium (VOLTAREN) 1 % Gel Apply the gel (2 g) to the affected area 4 times daily. Do not apply more than 8 g daily to any one affected joint of the upper extremities.    diphenoxylate-atropine 2.5-0.025 mg (LOMOTIL) 2.5-0.025 mg per tablet Take 1 tablet by mouth 4 (four) times daily as needed for Diarrhea (for episodic diarrhea).    magnesium oxide (MAG-OX) 400 mg (241.3 mg magnesium) tablet Take 1 tablet (400 mg total) by mouth once daily.    ondansetron (ZOFRAN) 4 MG tablet Take 1 tablet (4 mg total) by mouth every 8 (eight) hours as needed for Nausea.    promethazine (PHENERGAN) 12.5 MG Tab Take 2 tablets (25 mg total) by mouth every 6 (six) hours as needed.    triamcinolone acetonide 0.1% (KENALOG) 0.1 % ointment Apply topically 2 (two) times daily as needed.    trolamine salicylate (ASPERCREME) 10 % cream Apply topically as needed.     Family History     Problem Relation (Age of Onset)    Alzheimer's disease Father    Cancer Mother    No Known Problems Son, Son, Son, Son    Stroke Sister        Tobacco Use    Smoking status: Never Smoker    Smokeless tobacco: Never Used   Substance and Sexual Activity    Alcohol use: No    Drug use: No    Sexual activity: Not Currently     Review of Systems   Constitutional: Positive for chills and fever. Negative for activity change, appetite change,  diaphoresis, fatigue and unexpected weight change.   HENT: Negative.    Eyes: Negative.    Respiratory: Negative for cough, chest tightness, shortness of breath and wheezing.    Cardiovascular: Negative for chest pain, palpitations and leg swelling.   Gastrointestinal: Positive for abdominal pain, diarrhea, nausea and vomiting. Negative for abdominal distention, blood in stool and constipation.   Genitourinary: Negative for dysuria and hematuria.   Musculoskeletal: Negative.    Skin: Negative.    Neurological: Negative for dizziness, seizures, syncope, weakness and light-headedness.   Psychiatric/Behavioral: Negative.      Objective:     Vital Signs (Most Recent):  Temp: 97.5 °F (36.4 °C) (11/19/19 0504)  Pulse: 61 (11/19/19 0504)  Resp: 17 (11/19/19 0504)  BP: 139/61 (11/19/19 0504)  SpO2: 97 % (11/19/19 0504) Vital Signs (24h Range):  Temp:  [97.5 °F (36.4 °C)-99 °F (37.2 °C)] 97.5 °F (36.4 °C)  Pulse:  [] 61  Resp:  [17-25] 17  SpO2:  [95 %-100 %] 97 %  BP: (137-220)/() 139/61     Weight: 80.5 kg (177 lb 7.5 oz)  Body mass index is 28.64 kg/m².    Physical Exam   Constitutional: She is oriented to person, place, and time. She appears well-developed and well-nourished. No distress.   HENT:   Head: Normocephalic and atraumatic.   Right Ear: External ear normal.   Left Ear: External ear normal.   Nose: Nose normal.   Eyes: Right eye exhibits no discharge. Left eye exhibits no discharge.   Neck: Normal range of motion.   Cardiovascular: Normal rate, regular rhythm, normal heart sounds and intact distal pulses. Exam reveals no gallop and no friction rub.   No murmur heard.  Pulmonary/Chest: Effort normal and breath sounds normal. No stridor. No respiratory distress. She has no wheezes. She has no rales. She exhibits no tenderness.   Abdominal:   Soft, nondistended, mildly tender to the infraumbilical area, bowel sounds normal, no rebound or guarding   Musculoskeletal: Normal range of motion. She exhibits  no edema.   Neurological: She is alert and oriented to person, place, and time.   Skin: Skin is warm and dry. She is not diaphoretic. No erythema.   Psychiatric: She has a normal mood and affect. Her behavior is normal. Judgment and thought content normal.   Nursing note and vitals reviewed.          Significant Labs: All pertinent labs within the past 24 hours have been reviewed.    Significant Imaging: I have reviewed and interpreted all pertinent imaging results/findings within the past 24 hours.    Assessment/Plan:     * Acute cystitis  CT-abd/pelvis was relatively benign.  Urinalysis, however, was significant for a specific gravity of 1.020, cloudy appearance, 2+ protein, 1+ ketones, positive for nitrites, 3+ leukocytes, 30 WBCs, and many bacteria.  She does not meet criteria for sepsis.  Review of her previous urine cultures were significant for Escherichia coli resistant to penicillins, cephalosporins, aminoglycosides, and trimethoprim/sulfamethoxazole; pansensitive Proteus mirabilis; Enterobacter aerogenes resistant to cephalosporins, nitrofurantoin, and penicillins; Klebsiella pneumoniae sensitive only to carbapenems and amikacin.  She has been started on empiric antibiotic therapy with ceftriaxone pending urine cultures.  Of note, she was complaining of diarrhea which is likely secondary to antibiotic therapy.  Clostridioides difficile studies are pending.    Hypokalemia  Will replete orally and recheck her potassium level in the morning.    Essential hypertension  Patient's blood pressure is better-controlled; will continue home regimen of losartan, metoprolol, and tamsulosin, and provide as-needed clonidine.    Anemia of chronic disease  The patient's H/H is stable and consistent with previous laboratory measurements, and the patient exhibits no signs or symptoms of acute bleeding; there is no indication for transfusion.  Will continue to monitor.    Metastatic carcinoid tumor  Stable; there are no acute  issues.    VTE Risk Mitigation (From admission, onward)         Ordered     enoxaparin injection 40 mg  Daily      11/19/19 0531     IP VTE HIGH RISK PATIENT  Once      11/19/19 0531                   Arnaldo Monk M.D.  Staff Nocturnist  Department of Hospital Medicine  [facility]  Pager: [phone]          N.B.: Portions of this note was dictated using M*Modal Fluency Direct--there may be voice recognition errors occasionally missed on review.

## 2019-11-19 NOTE — PROGRESS NOTES
Understanding Urinary Tract Infections (UTIs)  Most UTIs are caused by bacteria, although they may also be caused by viruses or fungi. Bacteria from the bowel are the most common source of infection. The infection may start because of any of the following:  · Sexual activity. During sex, bacteria can travel from the penis, vagina, or rectum into the urethra.   · Bacteria on the skin outside the rectum may travel into the urethra. This is more common in women since the rectum and urethra are closer to each other than in men. Wiping from front to back after using the toilet and keeping the area clean can help prevent germs from getting to the urethra.  · Blockage of urine flow through the urinary tract. If urine sits too long, germs may start to grow out of control.      Parts of the urinary tract  The infection can occur in any part of the urinary tract.  · The kidneys collect and store urine.  · The ureters carry urine from the kidneys to the bladder.  · The bladder holds urine until you are ready to let it out.  · The urethra carries urine from the bladder out of the body. It is shorter in women, so bacteria can move through it more easily. The urethra is longer in men, so a UTI is less likely to reach the bladder or kidneys in men.

## 2019-11-19 NOTE — PLAN OF CARE
Problem: Adult Inpatient Plan of Care  Goal: Plan of Care Review  Flowsheets (Taken 11/19/2019 0513)  Plan of Care Reviewed With: patient     Problem: Fall Injury Risk  Goal: Absence of Fall and Fall-Related Injury  Intervention: Identify and Manage Contributors to Fall Injury Risk  Flowsheets (Taken 11/19/2019 0513)  Self-Care Promotion: independence encouraged  Medication Review/Management: medications reviewed  Intervention: Promote Injury-Free Environment  Flowsheets (Taken 11/19/2019 0513)  Safety Promotion/Fall Prevention: bed alarm set; room near unit station  Environmental Safety Modification: clutter free environment maintained; lighting adjusted; room near unit station     Problem: Adult Inpatient Plan of Care  Goal: Absence of Hospital-Acquired Illness or Injury  Intervention: Identify and Manage Fall Risk  Flowsheets (Taken 11/19/2019 0513)  Safety Promotion/Fall Prevention: bed alarm set; room near unit station  Intervention: Prevent VTE (venous thromboembolism)  Flowsheets (Taken 11/19/2019 0513)  VTE Prevention/Management: ROM (active) performed     Problem: Infection  Goal: Infection Symptom Resolution  Intervention: Prevent or Manage Infection  Flowsheets (Taken 11/19/2019 0513)  Infection Management: cultures obtained and sent to lab     Problem: Pain Acute  Goal: Optimal Pain Control  Intervention: Develop Pain Management Plan  Flowsheets (Taken 11/19/2019 0513)  Pain Management Interventions: pain management plan reviewed with patient/caregiver  Intervention: Prevent or Manage Pain  Flowsheets (Taken 11/19/2019 0513)  Sensory Stimulation Regulation: music/television provided for relaxation  Intervention: Optimize Psychosocial Wellbeing  Flowsheets (Taken 11/19/2019 0513)  Supportive Measures: verbalization of feelings encouraged  Diversional Activities: television

## 2019-11-19 NOTE — PLAN OF CARE
11/19/19 1614   Final Note   Assessment Type Final Discharge Note   Anticipated Discharge Disposition Home   Hospital Follow Up  Appt(s) scheduled? Yes   Discharge plans and expectations educations in teach back method with documentation complete? Yes   Right Care Referral Info   Post Acute Recommendation No Care   pts nurse Rocio notified that pt can d/c from CM standpoint.

## 2019-11-20 ENCOUNTER — OUTPATIENT CASE MANAGEMENT (OUTPATIENT)
Dept: ADMINISTRATIVE | Facility: OTHER | Age: 69
End: 2019-11-20

## 2019-11-20 ENCOUNTER — TELEPHONE (OUTPATIENT)
Dept: NEUROLOGY | Facility: HOSPITAL | Age: 69
End: 2019-11-20

## 2019-11-20 ENCOUNTER — TELEPHONE (OUTPATIENT)
Dept: FAMILY MEDICINE | Facility: CLINIC | Age: 69
End: 2019-11-20

## 2019-11-20 LAB
BACTERIA UR CULT: ABNORMAL
E COLI SXT1 STL QL IA: NEGATIVE
E COLI SXT2 STL QL IA: NEGATIVE

## 2019-11-20 NOTE — PROGRESS NOTES
Outpatient Care Management  Initial Patient Assessment    Patient: Patito Domingo  MRN: [id number]  Date of Service: 11/20/2019  Completed by: Padmini Clements RN  Referral Date: 11/13/2019  Program: Case Management (High Risk)    Reason for Visit   Patient presents with    OPCM Enrollment Call    Initial Assessment       Brief Summary: Pt reports that she gives permission to speak with her sons. Pt reports that she is currently living on the [facility] with one of her sons and his family. Pt reports that she is planning to get an apartment. Pt reports that she had a recent fall. Pt reports that she is weak and thinks that she would benefit from physical therapy. Pt reports that she would like to receive inpt therapy. Pt reports that it has been more that a year since she has received PT. Pt reports that she is currently having diarrhea. Pt reports that her skin to the buttocks are broken due to the frequent stools. Pt reports that she does have some difficulty with cleaning herself at this time. Pt's PHQ-9 score of 12. Results sent to PCP's staff. Pt reports that the magnesium tablets were dc'd. Pt reports that she is compliant with her medications. Pt reports that she was hoping to stay a couple of days in the hospital due to the current c/o pain and weakness. Pt reports that she fells as if no one cares about her at times. Pt reports that she has a difficult time with getting assistance from family members with her care. Pt reports that her family does not believe that she is in pain and needs the pain medication. Pt reports that she has a pain medication doctor at [facility]. Pt reports that she has a letter from the pain management doctor requesting for her to have assistance with taking her pain medications. Pt reports that she will bring the letter with her on her next PCP office visit. Pt reports that she has a problem with transportation. Pt reports that she uses [employer] transportation which is  unreliable and causes her to miss medical appts.    Assessment Documentation     OPCM Initial Assessment    Involvement of Care  Do I have permission to speak with other family members about your care?:  Yes  Assessment completed by:  Patient  Patient Reported Insurance  Verified current insurance plan:  [employer] Advantage  Current Health Status  Patient Health Rating  Compared to other people your age, how would you rate your health?:  Fair  Patient Reported Labs & Vitals  Any patient reported labs and/or vitals?:  No  Social Determinants  Advanced Care Planning  Do you have any of the following?:  Caregiver make informed decisions on your behalf  If yes, do we have a copy?:  No  If no, would the patient like Advance Directive resources?:  No  Advanced Care Planning resources provided?:  No  Is Advanced Care Planning an area of need?:  No  Support  Caregiver presence?:  No  Present activity level:  need help from person or special equipment to leave house  Who takes you to your medical appointments?:  other (see comment) (Comment: Pt reports that she uses the [employer] van. Reports that the van is not dependable.)  Housing  Living arrangements:  children  Number of people in home:  4  Type of residence:  single family home  Own or rent?:  own  Permanent residence?:  yes  Does the patient's residence have any of the following?:  n/a  Is housing an area of need?:  no  Access to Mass Media & Technology  Does the patient have access to any of the following devices or technologies?:  SmartPhone  Clinical Assessment  Medication Adherence  How does the patient obtain their medications?:  pharmacy delivery  How many days a week do you miss medications?:  never  Do you use a pill box or medication chart to help you manage your medications?:  pill box  Do you sometimes have difficulty refilling your medications?:  yes (see comment)  Is medication adherence an area of need?:  Yes  *Active medication list was reviewed and  reconciled with patient and/or caregiver:    Nutritional Status  Diet:  Regular  Recent changes in weight?:  weight loss > 10 lbs in the past 2 months  Was weight change intentional or unintentional?:  unintentional  Dentition:  N/A  Is nutrition an area of need?:  no  Labs  Where do you get your lab work done?:  [facility]  Is lab adherence an area of need?:  no  PHQ Depression Screen  Does patient's PHQ Depression Screening indicate a barrier to meeting self-care needs?:  Yes  Action taken:  Will send a message to PCP's staff and referr pt to LMSW with OPCM.  Cognitive/Behavioral Health  Alert and oriented?:  yes  Difficulty thinking?:  no  Requires prompting?:  no  Requires assistance for routine expression?:  no  Is Cognitive/Behavioral health an area of need?:  no  Culture/Nondenominational  Does patient have cultural or Hinduism beliefs that may impact ability to access healthcare?:  no  Communication  Language preference:  English   needed?:  no  Hearing problems?:  no  Decreased vision?:  no  Legally blind?:  no  Vision assistance:  n/a  Is Communication an area of need?:  no  Health Literacy  Preferred learning method:  reading materials  How often do you need to have someone help you read instructions, pamphlets, or other written material from your doctor or pharmacy?:  never  Is there a Health Literacy need?:  yes  Activities of Daily Living  Ambulation:  assistance required  Dressing:  assistance required  Bathing:  assistance required  Transfers:  assistance required  Toileting:  assistance required  Feeding:  independent  Cleaning home/chores:  dependent  Telephone use:  independent  Shopping/attending doctors' appointments:  dependent  Paying bills:  assistance required  Taking meds:  independent  Climbing stairs:  assistance required  Fall Risk  Patient mobility status:  Ambulatory w/ assistance  Number of falls in the past 12 months:  2+  Fall risk?:  Yes  Equipment/Current  Services  Equipment/supplies used in home:  cane, walker, wheelchair  Is Equipment/Supplies/Services an area of need?:  yes (Comment: Pt reports that she would like to receive inpt physical therapy due to weakness. )  Community & Government Programs  Support:  none  Government suppot assistance:  N/A  [facility] Adult Services:  N/A  Community Resource Assessment  Based on the assessment of needs:  Patient is in need of community resources (Comment: Pt is requesting services from inpt rehab or SNF.)  [facility]  consulted to assist with the following:  other (see comment), mental health resources (Comment: Pt has a PHQ-9 score of 13.)  Completion of Initial Assessment  Is the Initial Assessment Complete at this time?:  yes         Reviewed and provided basic information on available community resources for mental health, transportation, wellness resources, and palliative care programs with patient and/or caregiver.    Problem List and History     Patient Active Problem List   Diagnosis    Neuroendocrine carcinoma, unknown primary site          Pt reports active    Secondary neuroendocrine tumor of liver                         Pt reports active     Nephrolithiasis                                                                   Pt reports inactive     Carcinoid syndrome.                                                         Pt reports active      Resistant hypertension                                                       Pt reports active      Multiple thyroid nodules                                                         Metastatic carcinoid tumor                                                Pt reports active      Functional weakness                                                       Pt reports active      Increased frequency of urination                                       Iron deficiency anemia                                                  Pt reports active       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               Pt reports active            Chronic pain syndrome                                               Pt reports active      Narcotic drug use                                                       Pt reports active      Palliative care encounter    Goals of care, counseling/discussion    Encounter for education    Syncope     Bilateral nephrolithiasis    Renal colic on right side    Kidney stones                                                    Pt reports active      Right lower quadrant pain    Right shoulder pain    Acute cystitis                                                 Pt reports active      Hypokalemia                                                    Pt reports active      Essential hypertension                                   Pt reports active      Anemia of chronic disease                               Pt reports active         Reviewed Active Problem List with patient and/or Caregiver.    Medical History:  Reviewed medical history with patient and/or caregiver    Social History:  Reviewed social history with patient and/or caregiver    Complex Care Plan     Care plan was discussed and completed today with input from patient and/or caregiver.    Goals      Patient/caregiver will accept life style changes to manage and improve coping due to Metastatic Carcinoid tumor prior to discharge from OPCM. - Priority: High      Overall Time to Completion  2 months from 11/20/2019     OPCM Identified Patient Barriers:  Advanced Care Planning:  No  Nutrition:  no  Housing:  no  Medication Adherence:  Yes  Lab Adherence:  no  Cognitive/Behavioral Health:  no  Communication:  no  Health Literacy:  yes  Equipment/Supplies/Services:  yes (Comment: Pt reports that she would like to receive inpt physical therapy due to weakness. )  Culural/Denominational Beliefs:  no  PHQ:  Yes  Fall Risk:  Pos         Financial: Yes       OPCM Identified Disease  Education Barriers:  Hypertension:  Pos  Anemia:  Neg          Short Term Goals  Patient/caregiver will verbalize 2 risk factors of Ineffective coping within 1 month.  Interventions   · Assess patient's ability to perform ADLs.  · Assess type of communication barriers.  · Collaborate with Physician as appropriate to meet patient needs.  · Provide contact information for 24 hour Crisis Line number- COPE LINE.  · Recognize and provide educational material (KRAMES).  · Refer to Outpatient Case Management Social Worker.     Status  · Partially met      Patient/caregiver will verbalize 2 strategies to effectivecoping strategies within 1 month.  Interventions   · Assess type of communication barriers.  · Collaborate with Physician as appropriate to meet patient needs.  · Facilitate referral for counseling.  · Provide contact information for 24 hour Crisis Line number- COPE LINE.  · Recognize and provide educational material (KRAMES).  · Refer to Outpatient Case Management Social Worker.     Status  · Partially met           Clinical Reference Documents Added to Patient Instructions       Document    CANCER, RESOURCES FOR PEOPLE WITH (ENGLISH)    FALLS IN THE HOME, PREVENTING (ENGLISH)    FALLS, PREVENTING, MAKE YOUR HEALTH A PRIORITY (ENGLISH)    ONCOLOGY: COMMUNICATING WITH OTHERS  (ENGLISH)    WEAKNESS (UNCERTAIN CAUSE) (ENGLISH)             Patient/caregiver will accept life style changes to manage and improve risk of Falls prior to discharge from OPCM. - Priority: High      Overall Time to Completion  2 months from 11/20/2019     OPCM Identified Patient Barriers:  Advanced Care Planning:  No  Nutrition:  no  Housing:  no  Medication Adherence:  Yes  Lab Adherence:  no  Cognitive/Behavioral Health:  no  Communication:  no  Health Literacy:  yes  Equipment/Supplies/Services:  yes (Comment: Pt reports that she would like to receive inpt physical therapy due to weakness. )  Culural/Sabianism Beliefs:  no  PHQ:  Yes  Fall  Risk:  Pos         Financial: Yes       OPCM Identified Disease Education Barriers:  Hypertension:  Pos  Anemia:  Neg          Short Term Goals  Patient/caregiver will verbalize 2 risk factors of Falls within 1 month.  Interventions   · Assess patient's ability to perform ADLs.  · Collaborate with Physician as appropriate to meet patient needs.  · Discuss alternate Levels of Care with patient/caregiver.  · Encourage Exercise.  · Facilitate Home Health Services.  · Facilitate referral to Outpatient Rehab.  · Recognize and provide educational material (REMEDIOS).  · Refer to Outpatient Case Management Social Worker.     Status  · Partially met      Patient/caregiver will verbalize 2 strategies to rest breaks and manage fatigue within 1 month.  Interventions   · Assess patient's ability to perform ADLs.  · Collaborate with Physician as appropriate to meet patient needs.  · Recognize and provide educational material (REMEDIOS).     Status  · Partially met           Clinical Reference Documents Added to Patient Instructions       Document    CANCER, RESOURCES FOR PEOPLE WITH (ENGLISH)    FALLS IN THE HOME, PREVENTING (ENGLISH)    FALLS, PREVENTING, MAKE YOUR HEALTH A PRIORITY (ENGLISH)    ONCOLOGY: COMMUNICATING WITH OTHERS  (ENGLISH)    WEAKNESS (UNCERTAIN CAUSE) (ENGLISH)                  Patient Instructions     Instructions were provided via the [email or web address] patient resources and are available for the patient to view on the patient portal.    Next steps: Sent an in basket message to  PCP's staff to notify of PHQ-9 score, pt not taking magnesium, and pt's request for inpt therapy.                      Referred LMSW                       [facility]                      Educated pt on s/s of ineffective coping   No follow-ups on file.    Todays OPCM Self-Management Care Plan was developed with the patients/caregivers input and was based on identified barriers from todays assessment.  Goals were written today with  the patient/caregiver and the patient has agreed to work towards these goals to improve his/her overall well-being. Patient verbalized understanding of the care plan, goals, and all of today's instructions. Encouraged patient/caregiver to communicate with his/her physician and health care team about health conditions and the treatment plan.  Provided my contact information today and encouraged patient/caregiver to call me with any questions as needed.

## 2019-11-20 NOTE — TELEPHONE ENCOUNTER
----- Message from Bella Franco sent at 11/20/2019 12:35 PM CST -----  Contact: [phone]/self   BRIAN   Patient is requesting to speak with office to reschedule her appts due to transportation. Please call and advise.

## 2019-11-20 NOTE — TELEPHONE ENCOUNTER
----- Message from Ti Benjamin sent at 11/20/2019  8:40 AM CST -----  Contact: Self  Type: Patient Call Back    Who called: Self    What is the request in detail: please have  contact the patient    Can the clinic reply by [facility]? No     Would the patient rather a call back or a response via [facility]? Call     Best call back number: [phone]

## 2019-11-20 NOTE — TELEPHONE ENCOUNTER
Returned patients call. Assisted patient to reschedule an appointment with Dr Perez. Patient then transferred to scheduling to reschedule her Octreoscan. Patient has no further questions at this time.

## 2019-11-20 NOTE — PATIENT INSTRUCTIONS
Weakness (Uncertain Cause)  Based on your exam today, the exact cause of your weakness is not certain. However, your weakness does not seem to be a sign of a serious illness at this time. Keep an eye on your symptoms and get medical advice as instructed below.  Home care  · Rest at home today. Do not over-exert yourself.  · Take any medicine as prescribed.  · For the next few days, drink extra fluids (unless your healthcare provider wants you to restrict fluids for other reasons). Do not skip meals.  Follow-up care  Follow up with your healthcare provider or as advised.  When to seek medical advice  Call your healthcare provider for any of the following  · Worsening of your symptoms  · Symptoms don't start getting better within 2 days  · Fever of 100.4º F (38º C) or higher, or as directed by your healthcare provider·    Call 911  Get emergency medical care for any of these:  · Chest, arm, neck, jaw or upper back pain  · Trouble breathing  · Numbness or weakness of the face, one arm or one leg  · Slurred speech, confusion, trouble speaking, walking or seeing  · Blood in vomit or stool (black or red color)  · Loss of consciousness  Date Last Reviewed: 6/10/2015  © 9081-0916 [employer]. [address] [place] [address], [place] [address]. All rights reserved. This information is not intended as a substitute for professional medical care. Always follow your healthcare professional's instructions.        Preventing Falls: Make Your Health a Priority    Having a health problem can make you more likely to fall. Taking certain kinds of medicines may also increase your risk of falls. So, improving your health can help you avoid a fall. Work with your healthcare provider to manage health problems and to review your medicines. If you have your health under control, your risk of falling is lessened.  How chronic conditions increase your fall risk  Health problems like diabetes, high or low blood pressure, and arthritis  are called chronic health conditions. They can be managed, but they don't go away. Chronic health problems put you at greater risk of a fall. This is because they can affect many parts of your body. They may cause problems with movement, balance, or vision. And certain medicines you take for them may have side effects, such as dizziness or drowsiness. These side effects also increase your risk.  Work with your healthcare provider  Your healthcare provider can work with you to help prevent a fall. See your healthcare provider for a medical exam each year. Go more often if you have symptoms, such as leg numbness or dizziness, that could raise your risk of falling. If you have a history of falls, let your provider know. Bring a list of your medicines to review with your healthcare provider. Discuss your nutrition and exercise routine. And ask whether you need any tests to assess your risk of falling.  Review your medicines  Medicines (even ones you buy over the counter) can cause side effects that lead to a fall. Common medicines that cause these kinds of side effects are blood pressure, heart, or pain medicines, medicines for sleep, and antidepressants. Store pain medicine in a secure area, such as an upper cabinet in your kitchen or bathroom. Don't mix different pills in the same bottle. Always store and travel with medicines in their original containers with clear labels. This will reduce the chance of taking the wrong medicine or too much of a medicine. Taking too much of a medicine or taking the wrong medicine may cause side effects that can increase your risk of falling.  Also, the way your body reacts to medicines can change as you age. So, certain medicines that were fine in the past may cause side effects now. Your healthcare provider (such as your doctor or pharmacist) can help review your medicines and make changes if needed.  Get your eyes and ears checked  Problems with vision or hearing can lead to  falls:  · Get your eyes checked at least once a year. Take time to adjust to new glasses.  · Get your hearing checked at least every other year.  · Have your doctor check your inner ear for problems that may affect your balance.  Get the right nutrition  If you don't get enough to eat or drink, you can become dizzy and fall:  · Your sense of thirst decreases with age. Drink water throughout the day.  · Eat breakfast. Plan regular meals.  · Ask your healthcare provider whether you need supplements. These can help strengthen your bones and muscles to help prevent falls. They can also help prevent fractures if you do fall.  Stay as active as you can  Staying active is one of the best things you can do to prevent falls. Keep in mind that doing too little can be as risky as doing too much. That's because not being active can make you weaker and more likely to fall. Balance, flexibility, strength, and endurance all come from exercise. They all play a role in preventing falls. Ask your healthcare provider which types of activity are right for you.  When to call your healthcare provider  Be sure to call your healthcare provider if you fall. Also call if you have any of these signs or symptoms (someone else may need to point them out to you):  · Feeling lightheaded or dizzy more than once a day  · Losing your balance often or feeling unsteady on your feet  · Feeling numbness in your legs or feet, or noticing a change in the way you walk  · Having a steady decline in your memory or mental sharpness   Date Last Reviewed: 6/13/2015  © 6816-6541 [employer]. [address] [place] [address], [place] [address]. All rights reserved. This information is not intended as a substitute for professional medical care. Always follow your healthcare professional's instructions.        Preventing Falls in the Home  An adult or child can fall for many reasons. If you are an older adult, you may fall because your reaction time slows down.  Your muscles and joints may get stiff, weak, or less flexible because of illness, medicines, or a physical condition. These things can also make a child more likely to fall or be injured in a fall.  Other health problems that make falls more likely include:  · Arthritis  · Dizziness or lightheadedness when you get out of bed (orthostatic hypotension)  · History of a stroke  · Dizziness  · Anemia  · Certain medicines taken for mental illness  · Problems with balance or gait  · History of falls with or without an injury  · Changes in vision (vision impairment)  · Changes in thinking skills and memory (cognitive impairment)  Injuries from a fall can include broken bones, dislocated joints, and cuts. When these injuries are serious enough, they can make it impossible for you or a child who is injured in a fall to live on his or her own.  Prevention tips  To help prevent falls and fall-related injuries, follow the tips below.   Floors  Make floors safer by doing the following:   · Put nonskid pads under area rugs.  · Remove throw rugs.  · Replace worn floor coverings.  · Tack carpets firmly to each step on carpeted stairs. Put nonskid strips on the edges of uncarpeted stairs.  · Keep floors and stairs free of clutter and cords.  · Arrange furniture so there are clear pathways.  · Clean up any spills right away.  · Wear shoes that fit.  Bathrooms    Make bathrooms safer by doing the following:   · Install grab bars in the tub or shower.  · Apply nonskid strips or put a nonskid rubber mat in the tub or shower.  · Sit on a bath chair to bathe.  · Use bathmats with nonskid backing.  Lighting and the environment  Improve lighting in your home by doing the following:   · Keep a flashlight in each room. Or put a lamp next to the bed within easy reach.  · Put nightlights in the bedrooms, hallways, kitchen, and bathrooms.  · Make sure all stairways have good lighting.  · Take your time when going up and down stairs.  · Put  handrails on both sides of stairs and in walkways for more support. To prevent injury to your wrist or arm, dont use handrails to pull yourself up.  · Install grab bars to pull yourself up.  · Move or rearrange items that you use often. This will make them easier to find or reach.  · Look at your home to find any safety hazards. Especially look at doorways, walkways, and the driveway. Remove or repair any safety problems that you find.  Date Last Reviewed: 8/1/2016  © 6707-7763 [employer]. [address] [place] [address]. All rights reserved. This information is not intended as a substitute for professional medical care. Always follow your healthcare professional's instructions.        Resources for People with Cancer    You cant fight cancer alone. Reach out. Seek support from family, friends, and others who care about you. Let other people assist you. It can help you feel better both during and after your treatment.  Support groups  When you have cancer, support groups can be a great help. Meeting and talking with others with cancer can help you cope. There are also support groups for families of people with cancer. To find a support group, start by talking to your hospitals patient education department. Ask your healthcare provider. You can also do a search for cancer support groups on the Internet.  For more information  Contact the sources below for more information about cancer and cancer support groups:  · American Cancer Society (ACS)  411.701.9831  www.cancer.org  · National Cancer Mobile  241.787.7135  www.cancer.gov  Local resources  Ask your healthcare provider about your local ACS or other support groups:  _____________________________________________________________________  _____________________________________________________________________  _____________________________________________________________________  Date Last Reviewed: 1/6/2016  © 2336-9044 [employer]. [address] [place] [address], [place] [address]. All rights reserved. This information is not intended as a substitute for professional medical care. Always follow your healthcare professional's instructions.        Oncology: Communicating with Others     Be open and honest with loved ones about your needs.     Now that you are facing treatment for cancer, you may have concerns that you want to share. If you do want to talk, you may not know how to start. These ideas may help.  If talking helps  Maybe you sense that the people in your life want to know more about whats going on with you. Or maybe you feel that sharing your situation with others will help you to work through your own feelings. Here are some tips that will help prepare you to talk.  Be open and honest  People will be more willing to help if they are aware of what you are going through. Talk openly and honestly about your condition, its treatment, and how you are feeling. And if people offer, talk about how they can help you.  Ease their awkwardness  People may be afraid to say or do something wrong. Assure them that showing they care is helpful. And let them know that there may be no perfect thing to say.  Expect different reactions  People will respond in different ways. Some may seem angry. Others may refuse to hear any more. Still others may seem too upbeat. Understand that each of them means well. People often dont know what to say to someone with a serious illness.  Advice for family and friends  · Let them know if you do not want to be pressured to talk.  · Let them know if you feel angry about the situation and not to take it personally. When you feel angry let them know that it helps when they listen.  · Let them know that encouragement like  Dont worry. Everything will be fine can feel like empty statements. If they don't know what to say, tell them it's OK to say just that.  · Find your voice to let family and friends know when  you need help, such as rides to appointments, or help doing housework and errands.   If you prefer privacy  It is your right to decide if and when you will talk openly about your cancer and its treatment:  · Think of yourself. Take some time to absorb what you know before discussing it with others.  · Other people may want to talk before you are ready, especially people who are close to you. Let them know if you do not want to talk yet.  · Some families encourage being open, and others do not. You may want to find people on the outside who youre at ease talking with.  · Talking to a person who is not as close to you, such as a counselor, may be more helpful right now.  · Dont withdraw from friends and family if you are finding it hard to talk. Keep in mind that regardless of how much you decide to share with them, they can be a great source of comfort.  Date Last Reviewed: 1/11/2016  © 6880-5539 [employer]. [address] [place] [address], [place] [address]. All rights reserved. This information is not intended as a substitute for professional medical care. Always follow your healthcare professional's instructions.

## 2019-11-22 ENCOUNTER — OFFICE VISIT (OUTPATIENT)
Dept: FAMILY MEDICINE | Facility: CLINIC | Age: 69
End: 2019-11-22
Payer: MEDICARE

## 2019-11-22 ENCOUNTER — HOSPITAL ENCOUNTER (OUTPATIENT)
Dept: RADIOLOGY | Facility: HOSPITAL | Age: 69
Discharge: HOME | End: 2019-11-22
Attending: INTERNAL MEDICINE
Payer: MEDICARE

## 2019-11-22 VITALS
HEIGHT: 66 IN | DIASTOLIC BLOOD PRESSURE: 74 MMHG | TEMPERATURE: 99 F | BODY MASS INDEX: 28.56 KG/M2 | WEIGHT: 177.69 LBS | OXYGEN SATURATION: 97 % | HEART RATE: 62 BPM | SYSTOLIC BLOOD PRESSURE: 122 MMHG | RESPIRATION RATE: 16 BRPM

## 2019-11-22 DIAGNOSIS — M25.511 CHRONIC RIGHT SHOULDER PAIN: ICD-10-CM

## 2019-11-22 DIAGNOSIS — G89.29 CHRONIC RIGHT SHOULDER PAIN: ICD-10-CM

## 2019-11-22 DIAGNOSIS — L24.9 IRRITANT CONTACT DERMATITIS, UNSPECIFIED TRIGGER: Primary | ICD-10-CM

## 2019-11-22 DIAGNOSIS — N30.00 ACUTE CYSTITIS WITHOUT HEMATURIA: ICD-10-CM

## 2019-11-22 DIAGNOSIS — F51.04 PSYCHOPHYSIOLOGICAL INSOMNIA: ICD-10-CM

## 2019-11-22 LAB — BACTERIA STL CULT: NORMAL

## 2019-11-22 PROCEDURE — 99999 PR PBB SHADOW E&M-EST. PATIENT-LVL IV: CPT | Mod: PBBFAC,HCNC,, | Performed by: INTERNAL MEDICINE

## 2019-11-22 PROCEDURE — 1125F PR PAIN SEVERITY QUANTIFIED, PAIN PRESENT: ICD-10-PCS | Mod: HCNC,S$GLB,, | Performed by: INTERNAL MEDICINE

## 2019-11-22 PROCEDURE — 1159F MED LIST DOCD IN RCRD: CPT | Mod: HCNC,S$GLB,, | Performed by: INTERNAL MEDICINE

## 2019-11-22 PROCEDURE — 3078F DIAST BP <80 MM HG: CPT | Mod: HCNC,CPTII,S$GLB, | Performed by: INTERNAL MEDICINE

## 2019-11-22 PROCEDURE — 1101F PT FALLS ASSESS-DOCD LE1/YR: CPT | Mod: HCNC,CPTII,S$GLB, | Performed by: INTERNAL MEDICINE

## 2019-11-22 PROCEDURE — 1125F AMNT PAIN NOTED PAIN PRSNT: CPT | Mod: HCNC,S$GLB,, | Performed by: INTERNAL MEDICINE

## 2019-11-22 PROCEDURE — 1159F PR MEDICATION LIST DOCUMENTED IN MEDICAL RECORD: ICD-10-PCS | Mod: HCNC,S$GLB,, | Performed by: INTERNAL MEDICINE

## 2019-11-22 PROCEDURE — 3078F PR MOST RECENT DIASTOLIC BLOOD PRESSURE < 80 MM HG: ICD-10-PCS | Mod: HCNC,CPTII,S$GLB, | Performed by: INTERNAL MEDICINE

## 2019-11-22 PROCEDURE — 73030 X-RAY EXAM OF SHOULDER: CPT | Mod: TC,HCNC,FY,PO,RT

## 2019-11-22 PROCEDURE — 99214 OFFICE O/P EST MOD 30 MIN: CPT | Mod: HCNC,S$GLB,, | Performed by: INTERNAL MEDICINE

## 2019-11-22 PROCEDURE — 99214 PR OFFICE/OUTPT VISIT, EST, LEVL IV, 30-39 MIN: ICD-10-PCS | Mod: HCNC,S$GLB,, | Performed by: INTERNAL MEDICINE

## 2019-11-22 PROCEDURE — 3074F PR MOST RECENT SYSTOLIC BLOOD PRESSURE < 130 MM HG: ICD-10-PCS | Mod: HCNC,CPTII,S$GLB, | Performed by: INTERNAL MEDICINE

## 2019-11-22 PROCEDURE — 73030 XR SHOULDER COMPLETE 2 OR MORE VIEWS RIGHT: ICD-10-PCS | Mod: 26,HCNC,RT, | Performed by: RADIOLOGY

## 2019-11-22 PROCEDURE — 99999 PR PBB SHADOW E&M-EST. PATIENT-LVL IV: ICD-10-PCS | Mod: PBBFAC,HCNC,, | Performed by: INTERNAL MEDICINE

## 2019-11-22 PROCEDURE — 1101F PR PT FALLS ASSESS DOC 0-1 FALLS W/OUT INJ PAST YR: ICD-10-PCS | Mod: HCNC,CPTII,S$GLB, | Performed by: INTERNAL MEDICINE

## 2019-11-22 PROCEDURE — 73030 X-RAY EXAM OF SHOULDER: CPT | Mod: 26,HCNC,RT, | Performed by: RADIOLOGY

## 2019-11-22 PROCEDURE — 3074F SYST BP LT 130 MM HG: CPT | Mod: HCNC,CPTII,S$GLB, | Performed by: INTERNAL MEDICINE

## 2019-11-22 RX ORDER — HYDROCORTISONE VALERATE 2 MG/G
OINTMENT TOPICAL 2 TIMES DAILY
Qty: 60 G | Refills: 0 | Status: SHIPPED | OUTPATIENT
Start: 2019-11-22 | End: 2019-12-03 | Stop reason: SDUPTHER

## 2019-11-22 RX ORDER — METHOCARBAMOL 500 MG/1
500 TABLET, FILM COATED ORAL 3 TIMES DAILY PRN
Qty: 30 TABLET | Refills: 0 | Status: SHIPPED | OUTPATIENT
Start: 2019-11-22 | End: 2019-12-02

## 2019-11-22 RX ORDER — AMOXICILLIN AND CLAVULANATE POTASSIUM 875; 125 MG/1; MG/1
1 TABLET, FILM COATED ORAL 2 TIMES DAILY
Qty: 14 TABLET | Refills: 0 | Status: SHIPPED | OUTPATIENT
Start: 2019-11-22 | End: 2019-11-29

## 2019-11-22 NOTE — PROGRESS NOTES
Subjective:     Chief Complaint  Chief Complaint   Patient presents with    Shoulder Pain     Started a few weeks ago     Flank Pain    Lacey Domingo is a 69 y.o. female with medical diagnoses as listed in the medical history and problem list that presents for above complaints.  Last clinic visit was 11/18/2019.      Right shoulder pain - has been worsening for past 3 weeks  Difficulty with ROM of right shoulder     Hasn't started Macrobid which was prescribed on 11/19 - urine culture grew Proteus which is pan-sensitive  Still with urinary symptoms and abdominal pain    Patient Care Team:  Pantera Rosen MD as PCP - General (Internal Medicine)  Stan Marques MD as Consulting Physician (Urology)  Kirsten Hoffmann MA as Care Coordinator  Kirsten Hoffmann MA as Care Coordinator  Char Robbins MD as Consulting Physician (Nephrology)  Padmini Clements RN as Outpatient   Alyson Montalvo LMSW as Outpatient Case Management Social Worker      PAST MEDICAL HISTORY:  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)        PAST SURGICAL HISTORY:  Past Surgical History:   Procedure Laterality Date    CATARACT EXTRACTION Left 10/2017    CHOLECYSTECTOMY      COLON SURGERY      cystoscope      CYSTOSCOPY W/ RETROGRADES Right 10/10/2019    Procedure: CYSTOSCOPY, WITH RETROGRADE PYELOGRAM;  Surgeon: Gen Isbell MD;  [facility];  Service: Urology;  Laterality: Right;    EYE SURGERY      HYSTERECTOMY  5/1996    LITHOTRIPSY      LIVER BIOPSY  9/14    carcinoid    URETEROSCOPY Right 10/10/2019    Procedure: URETEROSCOPY;  Surgeon: Gen Isbell MD;  Location: [facility] OR;  Service: Urology;  Laterality: Right;       SOCIAL HISTORY:  Social History      Socioeconomic History    Marital status:      Spouse name: Not on file    Number of children: Not on file    Years of education: Not on file    Highest education level: Not on file   Occupational History    Occupation: disabled    Social Needs    Financial resource strain: Not on file    Food insecurity:     Worry: Not on file     Inability: Not on file    Transportation needs:     Medical: Not on file     Non-medical: Not on file   Tobacco Use    Smoking status: Never Smoker    Smokeless tobacco: Never Used   Substance and Sexual Activity    Alcohol use: No    Drug use: No    Sexual activity: Not Currently   Lifestyle    Physical activity:     Days per week: Not on file     Minutes per session: Not on file    Stress: Not on file   Relationships    Social connections:     Talks on phone: Not on file     Gets together: Not on file     Attends Confucianism service: Not on file     Active member of club or organization: Not on file     Attends meetings of clubs or organizations: Not on file     Relationship status: Not on file   Other Topics Concern    Not on file   Social History Narrative    Not on file       FAMILY HISTORY:  Family History   Problem Relation Age of Onset    Cancer Mother         unknown    Alzheimer's disease Father     Stroke Sister     No Known Problems Son     No Known Problems Son     No Known Problems Son     No Known Problems Son     Kidney disease Neg Hx        ALLERGIES AND MEDICATIONS: updated and reviewed.  Review of patient's allergies indicates:   Allergen Reactions    Epinephrine Anaphylaxis     Can cause  a Carcinoid Crisis    Contrast media Hives, Itching and Swelling    Ibuprofen Hives, Itching and Swelling    Iodinated contrast media     Sulfa (sulfonamide antibiotics) Hives, Itching and Swelling     Current Outpatient Medications   Medication Sig Dispense Refill    bumetanide (BUMEX) 0.5 MG Tab Take 1 tablet (0.5 mg total)  by mouth daily as needed. 30 tablet 2    cloNIDine (CATAPRES) 0.1 MG tablet TAKE ONE TABLET BY MOUTH THREE TIMES DAILY AS NEEDED FOR signs of withdrawal  1    cyanocobalamin (VITAMIN B-12) 1000 MCG tablet Take 1 tablet (1,000 mcg total) by mouth once daily. 30 tablet 5    diclofenac sodium (VOLTAREN) 1 % Gel Apply the gel (2 g) to the affected area 4 times daily. Do not apply more than 8 g daily to any one affected joint of the upper extremities. 100 g 1    diphenoxylate-atropine 2.5-0.025 mg (LOMOTIL) 2.5-0.025 mg per tablet Take 1 tablet by mouth 4 (four) times daily as needed for Diarrhea (for episodic diarrhea). 30 tablet 0    ferrous sulfate (FEOSOL) 325 mg (65 mg iron) Tab tablet TAKE ONE TABLET BY MOUTH THREE TIMES PER WEEK 12 tablet 2    losartan (COZAAR) 50 MG tablet Take 1 tablet (50 mg total) by mouth once daily. 30 tablet 2    metoprolol tartrate (LOPRESSOR) 50 MG tablet Take 1 tablet (50 mg total) by mouth 2 (two) times daily. 180 tablet 0    nitrofurantoin, macrocrystal-monohydrate, (MACROBID) 100 MG capsule Take 1 capsule (100 mg total) by mouth every 12 (twelve) hours. for 7 days 14 capsule 0    ondansetron (ZOFRAN) 4 MG tablet Take 1 tablet (4 mg total) by mouth every 8 (eight) hours as needed for Nausea. 30 tablet 0    oxyCODONE (ROXICODONE) 15 MG Tab TK 1 T PO  Q 4 H prn  0    potassium chloride SA (K-DUR,KLOR-CON) 10 MEQ tablet Take 1 tablet (10 mEq total) by mouth 2 (two) times daily. 60 tablet 0    trolamine salicylate (ASPERCREME) 10 % cream Apply topically as needed.      magnesium oxide (MAG-OX) 400 mg (241.3 mg magnesium) tablet Take 1 tablet (400 mg total) by mouth once daily. (Patient not taking: Reported on 11/20/2019) 30 tablet 2    promethazine (PHENERGAN) 12.5 MG Tab Take 2 tablets (25 mg total) by mouth every 6 (six) hours as needed. (Patient not taking: Reported on 11/22/2019) 30 tablet 2    tamsulosin (FLOMAX) 0.4 mg Cap Take 1 capsule (0.4 mg total) by mouth every  "evening. (Patient not taking: Reported on 11/22/2019) 30 capsule 0    triamcinolone acetonide 0.1% (KENALOG) 0.1 % ointment Apply topically 2 (two) times daily as needed. (Patient not taking: Reported on 11/22/2019) 30 g 0     No current facility-administered medications for this visit.          ROS:  Review of Systems   Constitutional: Negative for fever.   Genitourinary: Positive for frequency.       Objective:       Physical Exam  Vitals:    11/22/19 1616   BP: 122/74   BP Location: Left arm   Patient Position: Sitting   BP Method: Large (Manual)   Pulse: 62   Resp: 16   Temp: 98.6 °F (37 °C)   TempSrc: Oral   SpO2: 97%   Weight: 80.6 kg (177 lb 11.1 oz)   Height: 5' 6" (1.676 m)    Body mass index is 28.68 kg/m².  Weight: 80.6 kg (177 lb 11.1 oz)   Height: 5' 6" (167.6 cm)   Physical Exam   Constitutional: She appears well-developed. No distress.   HENT:   Head: Normocephalic and atraumatic.   Eyes: Conjunctivae and EOM are normal.   Neck: Normal range of motion.   Cardiovascular: Normal rate.   Pulmonary/Chest: Effort normal.   Abdominal:   Right flank tenderness   Musculoskeletal: She exhibits tenderness (R shoulder/glenohumeral joint). She exhibits no edema or deformity.   Neurological: She is alert. No cranial nerve deficit.   Skin: Skin is warm and dry. Rash (left shoulder - scaly plaques with secondary excoriation) noted.   Psychiatric: She has a normal mood and affect. Her behavior is normal.   Vitals reviewed.          Assessment:     1. Irritant contact dermatitis, unspecified trigger    2. Acute cystitis without hematuria    3. Psychophysiological insomnia    4. Chronic right shoulder pain      Plan:     Violet was seen today for shoulder pain, flank pain and rash.    Diagnoses and all orders for this visit:    Irritant contact dermatitis, unspecified trigger  Trial alternative steroid   -     hydrocortisone valerate (WEST-KAREN) 0.2 % ointment; Apply topically 2 (two) times daily. for 7 days    Acute " cystitis without hematuria  Discontinued Macrobid since not definitive therapy for cultured bacteria (never started) - urine culture grew Proteus which is pan-sensitive   -     amoxicillin-clavulanate 875-125mg (AUGMENTIN) 875-125 mg per tablet; Take 1 tablet by mouth 2 (two) times daily. for 7 days    Psychophysiological insomnia  Discussed briefly - deferred pharmacotherapy presently    Chronic right shoulder pain  Discussed etiology of possible rotator cuff tendinitis/impingement syndrome   Discussed mobilization, heat and stretching  Imaging ordered   Trial muscle relaxant  Return for re-evaluation in 4-6 weeks if symptoms not improved  -     X-ray Shoulder 2 or More Views Right; Future  -     methocarbamol (ROBAXIN) 500 MG Tab; Take 1 tablet (500 mg total) by mouth 3 (three) times daily as needed.          Health Maintenance       Date Due Completion Date    Influenza Vaccine (1) 09/01/2019 10/12/2018    TETANUS VACCINE 02/01/2020 (Originally 7/27/1968) ---    Pneumococcal Vaccine (65+ High/Highest Risk) (2 of 2 - PPSV23) 02/01/2020 (Originally 12/7/2017) 10/12/2017    Shingles Vaccine (1 of 2) 07/30/2020 (Originally 7/27/2002) ---    Mammogram 04/18/2020 4/18/2018    DEXA SCAN 09/19/2020 9/19/2017    Lipid Panel 06/26/2024 6/26/2019            Health Maintenance reviewed and addressed as per orders    Follow up if symptoms worsen or fail to improve.    The patient expressed understanding and no barriers to adherence were identified.     1. The patient indicates understanding of these issues and agrees with the plan. Brief care plan is updated and reviewed with the patient as applicable.     2. The patient is given an After Visit Summary that lists all medications with directions, allergies, orders placed during this encounter and follow-up instructions.     3. I have reviewed the patient's medical information including past medical, family, and social history sections including the medications and allergies.      4. We discussed the patient's current medications. I reconciled the patient's medication list and prepared and supplied needed refills.       Pantera Rosen MD  Internal Medicine-Pediatrics

## 2019-11-24 NOTE — PROGRESS NOTES
Reviewed - no significant glenohumeral arthritis on shoulder x-ray  To discuss result at upcoming visit

## 2019-11-26 ENCOUNTER — OUTPATIENT CASE MANAGEMENT (OUTPATIENT)
Dept: ADMINISTRATIVE | Facility: OTHER | Age: 69
End: 2019-11-26

## 2019-11-26 ENCOUNTER — TELEPHONE (OUTPATIENT)
Dept: FAMILY MEDICINE | Facility: CLINIC | Age: 69
End: 2019-11-26

## 2019-11-26 NOTE — LETTER
November 26, 2019    Patito TOBIAS Sommerjossy  [address]  [place] [address]             [facility] [address] [place] [address] Dear Ms. Domingo:    I have enclosed information about cancer support groups as discussed via telephone today. I am also including additional cancer resources: Cancer Association of [facility] and American Cancer Society, along with a senior resource guide. I hope this will be helpful.    If any additional needs arise, please contact me at [phone].    Follow up in two weeks.     Sincerely,          Alyson Montalvo LMSW     Outpatient Complex Care Management

## 2019-11-26 NOTE — TELEPHONE ENCOUNTER
Patient would be a good candidate for outpatient home health in the setting of debility related to her chronic medical conditions - I do not feel that inpatient SNF is merited presently unless a significant decline in function, ADL/IADLs occurs. I can certainly place home health orders if this option is available

## 2019-11-26 NOTE — TELEPHONE ENCOUNTER
----- Message from Alyson Montalvo LMSW sent at 11/26/2019 10:54 AM CST -----  Dr. Rosen,      I am a  with [facility] Outpatient Care Management Department working with Ms. Domingo. I received a referral about patient requesting inpatient rehab or SNF placement.  The patient is in agreement to  if that is an option. She does not have reliable transportation to commit to an outpatient setting for therapy.    In regards to inpatient placement the patient has [employer] and if you agree she needs inpatient placement, you can order it and contact [employer] Authorization department( [phone]). They will require reason for therapy any evaluations and documentation to make the determination.     Please advise of your recommendations and how I can be of help.     Thank you,   Alyson Montalvo LMSW  [phone]

## 2019-11-26 NOTE — PROGRESS NOTES
Outpatient Care Management   - High Risk Patient Assessment    Patient: Patito Domingo  MRN:  [id number]  Date of Service:  11/26/2019  Completed by:  Alyson Montalvo LMSW  Referral Date: 11/13/2019  Program: Case Management (High Risk)    Reason for Visit   Patient presents with    Social Work Assessment - High Risk    OPCM Chart Review    Plan Of Care     SUMMARY:   CLIFFORD received referral from OPCM PANCHITO [place] for asssitance with PHQ score and ADLs. RN requested assistance with possible rehab or SNF placement.  Patient is a 69 year old female. Patient has a DX of carcinoma for the past 4yrs. Patient reports its been a slow growing tumor but does receive treatments currently. Social work assessment was completed by patient/caregiver. Patient has no cognitive barriers. Marital status is   and current employment is retired. Patient currently is independent with ADLs and level of caregiver support is, patient independent. Patients support system consist of her adult children. Patient currently resides with her son. Patient reports having minimal  financial needs and gross monthly income is around 960. Patient reports she does not drink or smoke. Patient reports she is looking to get her own apartment soon and completed the needed paperwork. She states that she does have some concerns about pay bills on her own again. Discussed PHQ9 score with patient. She stated she sometimes depressed with the cancer and that her children often don't believe she is in real pain. She stated its not often but more depending on what she is going through. I asked patient would she like to speak with a psychiatrist or counselor. We discussed in detail support groups for cancer and the benefits of talking with other who can relate. She stated she would be willing to try this. CLIFFORD will send resources. CLIFFORD also dicussed transportation with patient. She reports using [employer] transportation which is unreliable. She  stated they come late or not at all which she then misses her appointments. She stated she does use the bus or cab services. She reports she can drive but no at the moment. That she has become extremely weak from cancer treatment. Patient reports that she needs some kind of therapy to help. She stated she did have HH in the past and it did help her. LMSW will look into what patient will need to any inpatient placements. Patient in agreement with  mailing resources( Support groups, ALINE, [employer]). No other concerns were voiced at this time. Patient in agreement, LMSW will follow up with patient within one week to evaluate progress towards goals.     LMSW messaged OPCM RN about getting a possible order for HH first.  Also sent message to Post Acute Department; Lynnette Sheppard response was that if the patient has [employer] they can override Medicare rules and place patient and would not need 3 overnight stays. Patients PCP would need to order it and make contact with [employer] authorization department. The PCP can send the documents on why patient needs to go, therapy evals, etc for the determination to be made. LMSW will place inbasket to PCP to determine best course of action and notify the patient.     Patient Summary     OPCM Social Work Assessment (High Risk)    Involvement of Care  Do I have permission to speak with other family members about your care?:  No  Assessment completed by:  Patient  Cognitive  Which of the following can you state?:  Name, Date of birth, Address, Year, President  Cognitive barriers?:  No  General  Marital status:    Current employment status:  Retired and not working  Support  Level of Caregiver support:  Member independent and does not need caregiver assistance  Support system:  Family, Children  Is the caregiver reporting burnout?:  No  Support Barriers?:  No  Advanced Care Planning  Do you have any of the following?:  None  If yes, do we have a copy?:   No  If no, would you like Advance Directive resources?:  No  Advance Care Planning resources provided?:  No  Is Advance Care Planning an area of need?:  Yes  Financial  Current medical coverage:  Medicare Advantage  Have you applied for government assistance programs?:  No  Are you unable to pay any of the following?:  None (Comment: not yet. currently living with son)  Gross monthly income:  960  Financial Support Barriers?:  No  Safety  Significant change in functioning?:  Disease progression   History  Do you or your spouse currently or formerly serve in the ?:  No  Wartime service?:  No  Current use of VA services?:  No  Disaster Plan  Established evacuation plan?:  Yes  Registered for evacuation?:  Yes  Ability to evacuate:  N/A (Comment: Drives)  Mental Health Status  Emotional status:  Stable  Have you recenetly lost a loved one?:  No  Psychiatric diagnosis:  None  Current mental health treatment:  No  Would you like mental health resources?:  No  Current symptoms:  Sleep disturbances  Mental/Behavioral/Environmental risk:  None  Mental Health Barriers?:  No  Addictive Behaviors  Current alcohol consumption?:  No  Current substance abuse?:  No  Gambling habits?:  No  Was the PHQ depression screening completed?:  No  Was the MARGARITA-7 completed?:  No  Resources  Caregiver stress:  Other (see comment) (Comment: Support group/ cancer resources)         Complex Care Plan     Care plan was discussed and completed today with input from patient and/or caregiver.    Goals      Patient/caregiver will accept life style changes to manage and improve coping due to Metastatic Carcinoid tumor prior to discharge from OPCM. - Priority: High      Overall Time to Completion  2 months from 11/20/2019     OPCM Identified Patient Barriers:  Advanced Care Planning:  No  Nutrition:  no  Housing:  no  Medication Adherence:  Yes  Lab Adherence:  no  Cognitive/Behavioral Health:  no  Communication:  no  Health Literacy:   yes  Equipment/Supplies/Services:  yes (Comment: Pt reports that she would like to receive inpt physical therapy due to weakness. )  Culural/Mormonism Beliefs:  no  PHQ:  Yes  Fall Risk:  Pos         Financial: Yes       OPCM Identified Disease Education Barriers:  Hypertension:  Pos  Anemia:  Neg          Short Term Goals  Patient/caregiver will verbalize 2 risk factors of Ineffective coping within 1 month.  Interventions   · Assess patient's ability to perform ADLs.  · Assess type of communication barriers.  · Collaborate with Physician as appropriate to meet patient needs.  · Provide contact information for 24 hour Crisis Line number- COPE LINE.  · Recognize and provide educational material (KRAMES).  · Refer to Outpatient Case Management Social Worker.     Status  · Partially met      Patient/caregiver will verbalize 2 strategies to effectivecoping strategies within 1 month.  Interventions   · Assess type of communication barriers.  · Collaborate with Physician as appropriate to meet patient needs.  · Facilitate referral for counseling.  · Provide contact information for 24 hour Crisis Line number- COPE LINE.  · Recognize and provide educational material (KRAMES).  · Refer to Outpatient Case Management Social Worker.     Status  · Partially met           Clinical Reference Documents Added to Patient Instructions       Document    CANCER, RESOURCES FOR PEOPLE WITH (ENGLISH)    FALLS IN THE HOME, PREVENTING (ENGLISH)    FALLS, PREVENTING, MAKE YOUR HEALTH A PRIORITY (ENGLISH)    ONCOLOGY: COMMUNICATING WITH OTHERS  (ENGLISH)    WEAKNESS (UNCERTAIN CAUSE) (ENGLISH)             Patient/caregiver will accept life style changes to manage and improve risk of Falls prior to discharge from OPCM. - Priority: High      Overall Time to Completion  2 months from 11/20/2019     OPCM Identified Patient Barriers:  Advanced Care Planning:  No  Nutrition:  no  Housing:  no  Medication Adherence:  Yes  Lab Adherence:   no  Cognitive/Behavioral Health:  no  Communication:  no  Health Literacy:  yes  Equipment/Supplies/Services:  yes (Comment: Pt reports that she would like to receive inpt physical therapy due to weakness. )  Culural/Islam Beliefs:  no  PHQ:  Yes  Fall Risk:  Pos         Financial: Yes       OPCM Identified Disease Education Barriers:  Hypertension:  Pos  Anemia:  Neg          Short Term Goals  Patient/caregiver will verbalize 2 risk factors of Falls within 1 month.  Interventions   · Assess patient's ability to perform ADLs.  · Collaborate with Physician as appropriate to meet patient needs.  · Discuss alternate Levels of Care with patient/caregiver.  · Encourage Exercise.  · Facilitate Home Health Services.  · Facilitate referral to Outpatient Rehab.  · Recognize and provide educational material (REMEDIOS).  · Refer to Outpatient Case Management Social Worker.     Status  · Partially met      Patient/caregiver will verbalize 2 strategies to rest breaks and manage fatigue within 1 month.  Interventions   · Assess patient's ability to perform ADLs.  · Collaborate with Physician as appropriate to meet patient needs.  · Recognize and provide educational material (REMEDIOS).     Status  · Partially met           Clinical Reference Documents Added to Patient Instructions       Document    CANCER, RESOURCES FOR PEOPLE WITH (ENGLISH)    FALLS IN THE HOME, PREVENTING (ENGLISH)    FALLS, PREVENTING, MAKE YOUR HEALTH A PRIORITY (ENGLISH)    ONCOLOGY: COMMUNICATING WITH OTHERS  (ENGLISH)    WEAKNESS (UNCERTAIN CAUSE) (ENGLISH)             Patient/caregiver will have knowledge of resources available in order to obtain the services that are needed prior to discharge from OPCM. - Priority: Low      Overall Time to Completion  1 month from 11/26/2019    Social Work Identified Patient Barriers:   Cognitive:  No  Support:  No  Advanced Care Planning:  Yes; deferred; (pt declines)  Mental Health:  No       Transportation: Yes; care  plan created  Financial: Yes; Care plan created      Short Term Goals  Patient/caregiver will identify 2 community resources to Depression and Cancer Diagnosis within 1 month.  Interventions   · Collaborate with OPCM RN as appropriate to meet patient needs.  · Collaborate with physician as appropriate to meet patient needs  · Discuss and provide CAGNO application.  · Discuss transportation options.  · Provide support group information.  · Provide transportation resource(s).  · Refer to community resource(s).   · 11/26/19: Possible SNF Placement.     Status  · Not met    Patient/Caregiver agrees to review resources by next follow up.  Patient/Caregiver agrees to OPCM follow up within one week to assess progress to goal.               Patient Instructions     Follow up in about 1 week (around 12/3/2019) for Follow up call with .    Todays OPCM Self-Management Care Plan was developed with the patients/caregivers input and was based on identified barriers from todays assessment.  Goals were written today with the patient/caregiver and the patient has agreed to work towards these goals to improve his/her overall well-being. Patient verbalized understanding of the care plan, goals, and all of today's instructions. Encouraged patient/caregiver to communicate with his/her physician and health care team about health conditions and the treatment plan.  Provided my contact information today and encouraged patient/caregiver to call me with any questions as needed.

## 2019-11-30 ENCOUNTER — TELEPHONE (OUTPATIENT)
Dept: FAMILY MEDICINE | Facility: CLINIC | Age: 69
End: 2019-11-30

## 2019-11-30 ENCOUNTER — EXTERNAL CHRONIC CARE MANAGEMENT (OUTPATIENT)
Dept: PRIMARY CARE CLINIC | Facility: CLINIC | Age: 69
End: 2019-11-30
Payer: MEDICARE

## 2019-11-30 DIAGNOSIS — Z20.828 EXPOSURE TO INFLUENZA: Primary | ICD-10-CM

## 2019-11-30 PROCEDURE — 99487 CPLX CHRNC CARE 1ST 60 MIN: CPT | Mod: S$GLB,,, | Performed by: FAMILY MEDICINE

## 2019-11-30 PROCEDURE — 99487 PR COMPLX CHRON CARE MGMT, 1ST HR, PER MONTH: ICD-10-PCS | Mod: S$GLB,,, | Performed by: FAMILY MEDICINE

## 2019-11-30 NOTE — TELEPHONE ENCOUNTER
Spoke with patient and she informed me that her grand son lives with her and was diagnosis on 11/29/19 with flu and wanted to know if she needs something called in the pharmacy for her.

## 2019-11-30 NOTE — TELEPHONE ENCOUNTER
----- Message from Lali Velasquez sent at 11/30/2019 10:19 AM CST -----  Contact: patient  Patient called to schedule an appointment with Dr Rosen on Monday 12.2.19  And wishes to speak with a nurse regarding this matter.       she  can be reached at [phone]    Thanks  KB

## 2019-12-02 RX ORDER — OSELTAMIVIR PHOSPHATE 75 MG/1
75 CAPSULE ORAL 2 TIMES DAILY
Qty: 14 CAPSULE | Refills: 0 | Status: SHIPPED | OUTPATIENT
Start: 2019-12-02 | End: 2019-12-09

## 2019-12-02 NOTE — TELEPHONE ENCOUNTER
Sent Tamiflu (prophylaxis dosage) in to patient's preferred pharmacy to be taken for 7 days in the setting of influenza exposure

## 2019-12-03 ENCOUNTER — OUTPATIENT CASE MANAGEMENT (OUTPATIENT)
Dept: ADMINISTRATIVE | Facility: OTHER | Age: 69
End: 2019-12-03

## 2019-12-03 ENCOUNTER — OFFICE VISIT (OUTPATIENT)
Dept: FAMILY MEDICINE | Facility: CLINIC | Age: 69
End: 2019-12-03
Payer: MEDICARE

## 2019-12-03 VITALS
OXYGEN SATURATION: 97 % | WEIGHT: 188.81 LBS | TEMPERATURE: 98 F | HEIGHT: 66 IN | HEART RATE: 60 BPM | SYSTOLIC BLOOD PRESSURE: 138 MMHG | BODY MASS INDEX: 30.34 KG/M2 | DIASTOLIC BLOOD PRESSURE: 60 MMHG

## 2019-12-03 DIAGNOSIS — I10 ESSENTIAL HYPERTENSION: Chronic | ICD-10-CM

## 2019-12-03 DIAGNOSIS — Z23 NEED FOR INFLUENZA VACCINATION: ICD-10-CM

## 2019-12-03 DIAGNOSIS — L24.9 IRRITANT CONTACT DERMATITIS, UNSPECIFIED TRIGGER: ICD-10-CM

## 2019-12-03 DIAGNOSIS — M75.41 IMPINGEMENT SYNDROME OF RIGHT SHOULDER: Primary | ICD-10-CM

## 2019-12-03 DIAGNOSIS — C7B.8 SECONDARY NEUROENDOCRINE TUMOR OF LIVER: ICD-10-CM

## 2019-12-03 DIAGNOSIS — C7B.00 METASTATIC CARCINOID TUMOR: Chronic | ICD-10-CM

## 2019-12-03 DIAGNOSIS — F39 MOOD DISORDER: ICD-10-CM

## 2019-12-03 PROCEDURE — 99999 PR PBB SHADOW E&M-EST. PATIENT-LVL IV: CPT | Mod: PBBFAC,HCNC,, | Performed by: INTERNAL MEDICINE

## 2019-12-03 PROCEDURE — 99999 PR PBB SHADOW E&M-EST. PATIENT-LVL IV: ICD-10-PCS | Mod: PBBFAC,HCNC,, | Performed by: INTERNAL MEDICINE

## 2019-12-03 PROCEDURE — 99499 UNLISTED E&M SERVICE: CPT | Mod: HCNC,S$GLB,, | Performed by: INTERNAL MEDICINE

## 2019-12-03 PROCEDURE — 99495 TRANSJ CARE MGMT MOD F2F 14D: CPT | Mod: HCNC,S$GLB,, | Performed by: INTERNAL MEDICINE

## 2019-12-03 PROCEDURE — 99499 RISK ADDL DX/OHS AUDIT: ICD-10-PCS | Mod: HCNC,S$GLB,, | Performed by: INTERNAL MEDICINE

## 2019-12-03 PROCEDURE — 99495 TCM SERVICES (MODERATE COMPLEXITY): ICD-10-PCS | Mod: HCNC,S$GLB,, | Performed by: INTERNAL MEDICINE

## 2019-12-03 RX ORDER — HYDROCORTISONE VALERATE 2 MG/G
OINTMENT TOPICAL 2 TIMES DAILY
Qty: 60 G | Refills: 0 | Status: SHIPPED | OUTPATIENT
Start: 2019-12-03 | End: 2019-12-23

## 2019-12-03 RX ORDER — ERGOCALCIFEROL 1.25 MG/1
CAPSULE ORAL
Status: ON HOLD | COMMUNITY
End: 2020-01-14

## 2019-12-03 NOTE — PATIENT INSTRUCTIONS
[facility] - [place] - [address], [place] [address]     Please call  [phone] to schedule an appointment with Physical/Occupational Therapy

## 2019-12-03 NOTE — PROGRESS NOTES
LMSW first follow up attempt. Left VM with contact information requesting return call. Will attempt to contact patient again.

## 2019-12-03 NOTE — PROGRESS NOTES
Subjective:     Chief Complaint  Chief Complaint   Patient presents with    Hospital Follow Up       HPI  Patito Domingo is a 69 y.o. female with medical diagnoses as listed in the medical history and problem list that presents for hospital follow-up.  Last clinic visit was 11/22/2019.      Still experiencing right shoulder pain   - reviewed normal XR right shoulder   Taking Robaxin - mild relief    Irritant dermatitis of left neck/shoulder - wasn't able to get Westcort    Mood   Recently scored a 12 on PHQ-9   A lot of her depressed mood has been due to worrying about her cancer (upcoming scans)    Transitional Care Note    Family and/or Caretaker present at visit?  No.  Diagnostic tests reviewed/disposition: No diagnosic tests pending after this hospitalization.  Disease/illness education: discussed  Home health/community services discussion/referrals: Patient does not have home health established from hospital visit.  They are inquiring about need home health.  If needed, we will set up home health for the patient.   Establishment or re-establishment of referral orders for community resources: recent discussions with CM.   Discussion with other health care providers: No discussion with other health care providers necessary.       Patient Care Team:  Pantera Rosen MD as PCP - General (Internal Medicine)  Stan Marques MD as Consulting Physician (Urology)  Kirsten Hoffmann MA as Care Coordinator  Kirsten Hoffmann MA as Care Coordinator  Char Robbins MD as Consulting Physician (Nephrology)  Padmini Clements RN as Outpatient   Alyson Montalvo LMSW as Outpatient Case Management Social Worker      PAST MEDICAL HISTORY:  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)         PAST SURGICAL HISTORY:  Past Surgical History:   Procedure Laterality Date    CATARACT EXTRACTION Left 10/2017    CHOLECYSTECTOMY      COLON SURGERY      cystoscope      CYSTOSCOPY W/ RETROGRADES Right 10/10/2019    Procedure: CYSTOSCOPY, WITH RETROGRADE PYELOGRAM;  Surgeon: Gen Isbell MD;  Location: [facility];  Service: Urology;  Laterality: Right;    EYE SURGERY      HYSTERECTOMY  5/1996    LITHOTRIPSY      LIVER BIOPSY  9/14    carcinoid    URETEROSCOPY Right 10/10/2019    Procedure: URETEROSCOPY;  Surgeon: Gen Isbell MD;  Location: [facility];  Service: Urology;  Laterality: Right;       SOCIAL HISTORY:  Social History     Socioeconomic History    Marital status:      Spouse name: Not on file    Number of children: Not on file    Years of education: Not on file    Highest education level: Not on file   Occupational History    Occupation: disabled    Social Needs    Financial resource strain: Somewhat hard    Food insecurity:     Worry: Never true     Inability: Never true    Transportation needs:     Medical: Yes     Non-medical: Yes   Tobacco Use    Smoking status: Never Smoker    Smokeless tobacco: Never Used   Substance and Sexual Activity    Alcohol use: No     Alcohol/week: 0.0 standard drinks     Frequency: Never     Binge frequency: Never    Drug use: No    Sexual activity: Not Currently   Lifestyle    Physical activity:     Days per week: 2 days     Minutes per session: 10 min    Stress: Only a little   Relationships    Social connections:     Talks on phone: Not on file     Gets together: Not on file     Attends Pentecostal service: Not on file     Active member of club or organization: Not on file     Attends meetings of clubs or organizations: Not on file     Relationship status:    Other Topics Concern    Not on file   Social History Narrative    Not on file       FAMILY HISTORY:  Family History   Problem Relation Age of Onset     Cancer Mother         unknown    Alzheimer's disease Father     Stroke Sister     No Known Problems Son     No Known Problems Son     No Known Problems Son     No Known Problems Son     Kidney disease Neg Hx        ALLERGIES AND MEDICATIONS: updated and reviewed.  Review of patient's allergies indicates:   Allergen Reactions    Epinephrine Anaphylaxis     Can cause  a Carcinoid Crisis    Contrast media Hives, Itching and Swelling    Ibuprofen Hives, Itching and Swelling    Iodinated contrast media     Sulfa (sulfonamide antibiotics) Hives, Itching and Swelling     Current Outpatient Medications   Medication Sig Dispense Refill    bumetanide (BUMEX) 0.5 MG Tab Take 1 tablet (0.5 mg total) by mouth daily as needed. 30 tablet 2    cloNIDine (CATAPRES) 0.1 MG tablet TAKE ONE TABLET BY MOUTH THREE TIMES DAILY AS NEEDED FOR signs of withdrawal  1    cyanocobalamin (VITAMIN B-12) 1000 MCG tablet Take 1 tablet (1,000 mcg total) by mouth once daily. 30 tablet 5    diclofenac sodium (VOLTAREN) 1 % Gel Apply the gel (2 g) to the affected area 4 times daily. Do not apply more than 8 g daily to any one affected joint of the upper extremities. 100 g 1    diphenoxylate-atropine 2.5-0.025 mg (LOMOTIL) 2.5-0.025 mg per tablet Take 1 tablet by mouth 4 (four) times daily as needed for Diarrhea (for episodic diarrhea). 30 tablet 0    ferrous sulfate (FEOSOL) 325 mg (65 mg iron) Tab tablet TAKE ONE TABLET BY MOUTH THREE TIMES PER WEEK 12 tablet 2    losartan (COZAAR) 50 MG tablet Take 1 tablet (50 mg total) by mouth once daily. 30 tablet 2    magnesium oxide (MAG-OX) 400 mg (241.3 mg magnesium) tablet Take 1 tablet (400 mg total) by mouth once daily. 30 tablet 2    metoprolol tartrate (LOPRESSOR) 50 MG tablet Take 1 tablet (50 mg total) by mouth 2 (two) times daily. 180 tablet 0    ondansetron (ZOFRAN) 4 MG tablet Take 1 tablet (4 mg total) by mouth every 8 (eight) hours as needed for Nausea. 30 tablet 0     "oseltamivir (TAMIFLU) 75 MG capsule Take 1 capsule (75 mg total) by mouth 2 (two) times daily. for 7 days 14 capsule 0    oxyCODONE (ROXICODONE) 15 MG Tab TK 1 T PO  Q 4 H prn  0    potassium chloride SA (K-DUR,KLOR-CON) 10 MEQ tablet Take 1 tablet (10 mEq total) by mouth 2 (two) times daily. 60 tablet 0    promethazine (PHENERGAN) 12.5 MG Tab Take 2 tablets (25 mg total) by mouth every 6 (six) hours as needed. 30 tablet 2    tamsulosin (FLOMAX) 0.4 mg Cap Take 1 capsule (0.4 mg total) by mouth every evening. 30 capsule 0    trolamine salicylate (ASPERCREME) 10 % cream Apply topically as needed.      ergocalciferol (ERGOCALCIFEROL) 50,000 unit Cap Vitamin D2 1,250 mcg (50,000 unit) capsule   TAKE 1 CAPSULE BY MOUTH EVERY WEEK      hydrocortisone valerate (WEST-KAREN) 0.2 % ointment Apply topically 2 (two) times daily. for 7 days 60 g 0     No current facility-administered medications for this visit.          ROS:  Review of Systems   Musculoskeletal: Positive for arthralgias (R shoulder).       Objective:       Physical Exam  Vitals:    12/03/19 1358 12/03/19 1451   BP: (!) 148/72 138/60   BP Location: Left arm    Patient Position: Sitting    BP Method: Medium (Manual)    Pulse: 60    Temp: 98.4 °F (36.9 °C)    TempSrc: Oral    SpO2: 97%    Weight: 85.6 kg (188 lb 13.2 oz)    Height: 5' 6" (1.676 m)     Body mass index is 30.48 kg/m².  Weight: 85.6 kg (188 lb 13.2 oz)   Height: 5' 6" (167.6 cm)   Physical Exam   Constitutional: She appears well-developed. No distress.   HENT:   Head: Normocephalic and atraumatic.   Cardiovascular: Normal rate.   Pulmonary/Chest: Effort normal.   Musculoskeletal: She exhibits tenderness (R anterior/posterior glenohumeral tenderness). She exhibits no edema or deformity.   Positive Neer/Staton sign of RUE     Neurological: She is alert.   Skin: Skin is warm and dry.   Irregular erythematous patch of left shoulder region   Psychiatric: She has a normal mood and affect. Her " behavior is normal.   Vitals reviewed.          Assessment:     1. Impingement syndrome of right shoulder    2. Irritant contact dermatitis, unspecified trigger    3. Need for influenza vaccination    4. Secondary neuroendocrine tumor of liver    5. Metastatic carcinoid tumor    6. Mood disorder    7. Essential hypertension      Plan:     Patito was seen today for hospital follow up.    Diagnoses and all orders for this visit:    Impingement syndrome of right shoulder  Discussed etiology of rotator cuff tendinitis/impingement syndrome   Recommend PT - referral placed   Return for re-evaluation in 8 weeks if symptoms not improved  -     Ambulatory Referral to Physical/Occupational Therapy    Irritant contact dermatitis, unspecified trigger  Trial topical steroid - previously not received  -     hydrocortisone valerate (WEST-KAREN) 0.2 % ointment; Apply topically 2 (two) times daily. for 7 days    Neuroendocrine carcinoma  Following with Oncology    Need for influenza vaccination  Counseled regarding seasonal influenza vaccination - (will schedule)   -     Influenza - High Dose (65+) (PF) (IM)          Health Maintenance       Date Due Completion Date    Influenza Vaccine (1) 09/01/2019 10/12/2018    TETANUS VACCINE 02/01/2020 (Originally 7/27/1968) ---    Pneumococcal Vaccine (65+ High/Highest Risk) (2 of 2 - PPSV23) 02/01/2020 (Originally 12/7/2017) 10/12/2017    Shingles Vaccine (1 of 2) 07/30/2020 (Originally 7/27/2002) ---    Mammogram 04/18/2020 4/18/2018    DEXA SCAN 09/19/2020 9/19/2017    Lipid Panel 06/26/2024 6/26/2019            Health Maintenance reviewed and addressed as per orders    Follow up in about 3 months (around 3/3/2020) for chronic medical problems.    The patient expressed understanding and no barriers to adherence were identified.     1. The patient indicates understanding of these issues and agrees with the plan. Brief care plan is updated and reviewed with the patient as applicable.     2.  The patient is given an After Visit Summary that lists all medications with directions, allergies, orders placed during this encounter and follow-up instructions.     3. I have reviewed the patient's medical information including past medical, family, and social history sections including the medications and allergies.     4. We discussed the patient's current medications. I reconciled the patient's medication list and prepared and supplied needed refills.       Pantera Rosen MD  Internal Medicine-Pediatrics

## 2019-12-04 ENCOUNTER — TELEPHONE (OUTPATIENT)
Dept: NEUROLOGY | Facility: HOSPITAL | Age: 69
End: 2019-12-04

## 2019-12-04 ENCOUNTER — OUTPATIENT CASE MANAGEMENT (OUTPATIENT)
Dept: ADMINISTRATIVE | Facility: OTHER | Age: 69
End: 2019-12-04

## 2019-12-04 DIAGNOSIS — I1A.0 RESISTANT HYPERTENSION: ICD-10-CM

## 2019-12-04 RX ORDER — METOPROLOL TARTRATE 50 MG/1
TABLET ORAL
Qty: 180 TABLET | Refills: 0 | Status: SHIPPED | OUTPATIENT
Start: 2019-12-04 | End: 2020-01-28 | Stop reason: SDUPTHER

## 2019-12-04 NOTE — TELEPHONE ENCOUNTER
Spoke with pt and she said she is having transportation issues. She will call tomorrow to let me know if she can make her appt for the octreo scan.

## 2019-12-04 NOTE — PROGRESS NOTES
Outpatient Care Management  Plan of Care Follow Up Visit    Patient: Patito Domingo  MRN: [id number]  Date of Service: 12/4/2019  Completed by: Padmini Clements RN  Referral Date: 11/13/2019  Program: Case Management (High Risk)    No chief complaint on file.      Brief Summary: Pt reports that she is feeling better. Pt reports that her mood is better at this time. Pt reports that she called last week arrange transportation for an appt scheduled for tomorrow. Pt reports that she was informed that the because the transportation company had to wrong address the ride has to be rerouted. Pt is waiting to find out if they were able to keep the appt for her as scheduled. Pt reports that she will start therapy at the end of the month. Pt reports that the diarrhea is intermittent. Educated pt on the [employer] phone number. Educated pt on how the mood affects the body. Pt reports that she tries to be active to decrease feeling down.     Patient Summary     Involvement of Care:  Do I have permission to speak with other family members about your care?       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    Patient Reported Labs &  Vitals:  1.  Any Patient Reported Labs & Vitals?     2.  Patient Reported Blood Pressure:     3.  Patient Reported Pulse:     4.  Patient Reported Weight (Kg):     5.  Patient Reported Blood Glucose (mg/dl):       Medical History:  Reviewed medical history with patient and/or caregiver    Social History:  Reviewed social history with patient and/or caregiver    Clinical Assessment     Reviewed and provided basic information on available community resources for mental health, transportation, wellness resources, and palliative care programs with patient and/or caregiver.    Complex Care Plan     Care plan was discussed and completed today with input from patient and/or caregiver.    Goals      Patient/caregiver will accept life style changes to manage and improve coping due to Metastatic Carcinoid tumor prior to discharge from OPCM. - Priority: High      Overall Time to Completion  2 months from 11/20/2019     OPCM Identified Patient Barriers:  Medication Adherence:  Yes  Health Literacy:  yes  Equipment/Supplies/Services:  yes (Comment: Pt reports that she would like to receive inpt physical therapy due to weakness. )  PHQ:  Yes  Fall Risk:  Pos         Financial: Yes       OPCM Identified Disease Education Barriers:  Hypertension:  Pos  Anemia:  Neg          Short Term Goals  Patient/caregiver will verbalize 2 risk factors of Ineffective coping within 1 month.  Interventions   · Assess patient's ability to perform ADLs.  · Assess type of communication barriers.  · Collaborate with Physician as appropriate to meet patient needs.  · Provide contact information for 24 hour Crisis Line number- [facility].  · Recognize and provide educational material ([employer]OS).  · Refer to Outpatient Case Management Social Worker.     Status  · Partially met 12/4/19      Patient/caregiver will verbalize 2 strategies to effectivecoping strategies within 1 month.  Interventions   · Assess type of communication barriers.  · Collaborate with  Physician as appropriate to meet patient needs.  · Facilitate referral for counseling.  · Provide contact information for 24 hour Crisis Line number- [facility] LINE.  · Recognize and provide educational material (REMEDIOS).  · Refer to Outpatient Case Management Social Worker.     Status  · Partially met           Clinical Reference Documents Added to Patient Instructions       Document    CANCER, RESOURCES FOR PEOPLE WITH (ENGLISH)    FALLS IN THE HOME, PREVENTING (ENGLISH)    FALLS, PREVENTING, MAKE YOUR HEALTH A PRIORITY (ENGLISH)    ONCOLOGY: COMMUNICATING WITH OTHERS  (ENGLISH)    WEAKNESS (UNCERTAIN CAUSE) (ENGLISH)             Patient/caregiver will accept life style changes to manage and improve risk of Falls prior to discharge from OPCM. - Priority: High      Overall Time to Completion  2 months from 11/20/2019     OPCM Identified Patient Barriers:  Advanced Care Planning:  No  Nutrition:  no  Housing:  no  Medication Adherence:  Yes  Lab Adherence:  no  Cognitive/Behavioral Health:  no  Communication:  no  Health Literacy:  yes  Equipment/Supplies/Services:  yes (Comment: Pt reports that she would like to receive inpt physical therapy due to weakness. )  Culural/Alevism Beliefs:  no  PHQ:  Yes  Fall Risk:  Pos         Financial: Yes       OPCM Identified Disease Education Barriers:  Hypertension:  Pos  Anemia:  Neg          Short Term Goals  Patient/caregiver will verbalize 2 risk factors of Falls within 1 month.  Interventions   · Assess patient's ability to perform ADLs.  · Collaborate with Physician as appropriate to meet patient needs.  · Discuss alternate Levels of Care with patient/caregiver.  · Encourage Exercise.  · Facilitate Home Health Services.  · Facilitate referral to Outpatient Rehab.  · Recognize and provide educational material ([employer]MEDIOS).  · Refer to Outpatient Case Management Social Worker.     Status  · Partially met      Patient/caregiver will verbalize 2 strategies to rest breaks and manage  fatigue within 1 month.  Interventions   · Assess patient's ability to perform ADLs.  · Collaborate with Physician as appropriate to meet patient needs.  · Recognize and provide educational material ([employer]IOS).     Status  · Partially met           Clinical Reference Documents Added to Patient Instructions       Document    CANCER, RESOURCES FOR PEOPLE WITH (ENGLISH)    FALLS IN THE HOME, PREVENTING (ENGLISH)    FALLS, PREVENTING, MAKE YOUR HEALTH A PRIORITY (ENGLISH)    ONCOLOGY: COMMUNICATING WITH OTHERS  (ENGLISH)    WEAKNESS (UNCERTAIN CAUSE) (ENGLISH)             Patient/caregiver will have knowledge of resources available in order to obtain the services that are needed prior to discharge from OPCM. - Priority: Low      Overall Time to Completion  1 month from 11/26/2019    Social Work Identified Patient Barriers:   Cognitive:  No  Support:  No  Advanced Care Planning:  Yes; deferred; (pt declines)  Mental Health:  No       Transportation: Yes; care plan created  Financial: Yes; Care plan created      Short Term Goals  Patient/caregiver will identify 2 community resources to Depression and Cancer Diagnosis within 1 month.  Interventions   · Collaborate with OPCM RN as appropriate to meet patient needs.  · Collaborate with physician as appropriate to meet patient needs  · Discuss and provide CAGNO application.  · Discuss transportation options.  · Provide support group information.  · Provide transportation resource(s).  · Refer to community resource(s).   · 11/26/19: Possible SNF Placement.     Status  · Not met    Patient/Caregiver agrees to review resources by next follow up.  Patient/Caregiver agrees to OPCM follow up within one week to assess progress to goal.               Patient Instructions     Instructions were provided via the [employer] patient resources and are available for the patient to view on the patient portal.    Next Steps: Continue to educate the pt on ineffective coping.  No follow-ups on  file.      Todays OPCM Self-Management Care Plan was developed with the patients/caregivers input and was based on identified barriers from todays assessment.  Goals were written today with the patient/caregiver and the patient has agreed to work towards these goals to improve his/her overall well-being. Patient verbalized understanding of the care plan, goals, and all of today's instructions. Encouraged patient/caregiver to communicate with his/her physician and health care team about health conditions and the treatment plan.  Provided my contact information today and encouraged patient/caregiver to call me with any questions as needed.

## 2019-12-05 ENCOUNTER — HOSPITAL ENCOUNTER (OUTPATIENT)
Dept: RADIOLOGY | Facility: HOSPITAL | Age: 69
Discharge: HOME | End: 2019-12-05
Attending: SURGERY
Payer: MEDICARE

## 2019-12-05 ENCOUNTER — TELEPHONE (OUTPATIENT)
Dept: NEUROLOGY | Facility: HOSPITAL | Age: 69
End: 2019-12-05

## 2019-12-05 DIAGNOSIS — C7B.8 SECONDARY NEUROENDOCRINE TUMOR OF DISTANT LYMPH NODES: ICD-10-CM

## 2019-12-05 DIAGNOSIS — C7A.092 MALIGNANT CARCINOID TUMOR OF STOMACH: ICD-10-CM

## 2019-12-05 DIAGNOSIS — C7B.8 SECONDARY NEUROENDOCRINE TUMOR OF LIVER: ICD-10-CM

## 2019-12-05 PROCEDURE — 78804 NM TUMOR LOC MULTI SPECT OCTREOSCAN: ICD-10-PCS | Mod: 26,HCNC,, | Performed by: RADIOLOGY

## 2019-12-05 PROCEDURE — 78803 RP LOCLZJ TUM SPECT 1 AREA: CPT | Mod: TC,HCNC

## 2019-12-05 PROCEDURE — 78999 NM TUMOR LOC MULTI SPECT OCTREOSCAN: ICD-10-PCS | Mod: 26,HCNC,, | Performed by: RADIOLOGY

## 2019-12-05 PROCEDURE — 78803 NM TUMOR LOCALIZATION SPECT: ICD-10-PCS | Mod: 26,HCNC,, | Performed by: RADIOLOGY

## 2019-12-05 PROCEDURE — 78803 RP LOCLZJ TUM SPECT 1 AREA: CPT | Mod: 26,HCNC,, | Performed by: RADIOLOGY

## 2019-12-05 PROCEDURE — 78804 RP LOCLZJ TUM WHBDY 2+D IMG: CPT | Mod: 26,HCNC,, | Performed by: RADIOLOGY

## 2019-12-05 PROCEDURE — 78803 NM TUMOR LOC MULTI SPECT OCTREOSCAN: ICD-10-PCS | Mod: 26,HCNC,, | Performed by: RADIOLOGY

## 2019-12-05 PROCEDURE — 78999 UNLISTED MISC PX DX NUC MED: CPT | Mod: 26,HCNC,, | Performed by: RADIOLOGY

## 2019-12-05 NOTE — TELEPHONE ENCOUNTER
----- Message from Alexis Hernandez sent at 12/5/2019  8:51 AM CST -----  Contact: Jon (son)  Patient' son called to speak with your office in regard to her appointment today.    Please call [phone] to speak with him today.

## 2019-12-05 NOTE — PROGRESS NOTES
Outpatient Care Management   - Care Plan Follow Up    Patient: Patito Domingo  MRN:  [id number]  Date of Service:  12/5/2019  Completed by:  Alyson Montalvo LMSW  Referral Date: 11/13/2019  Program: Case Management (High Risk)    Reason for Visit   Patient presents with    OPCM Chart Review    Update Plan Of Care     SUMMARY:   CLIFFORD completed FU with patient she stated her son hasn't checked her P.O Box for the information but she may not need it. I explained that I sent out information for cancer support groups and cancer resources. She stated he doctor talked to the SW at the clinic about it and she will call and let me know. PCP did place order for outpatient therapy close to patients home. She will start going at the end of the month.  No other concerns were voiced at this time. Patient in agreement, CLIFFORD will follow up with patient within two weeks to evaluate progress towards goals.     Complex Care Plan     Care plan was discussed and completed today with input from patient and/or caregiver.    Goals      Patient/caregiver will accept life style changes to manage and improve coping due to Metastatic Carcinoid tumor prior to discharge from OPCM. - Priority: High      Overall Time to Completion  2 months from 11/20/2019     OPCM Identified Patient Barriers:  Medication Adherence:  Yes  Health Literacy:  yes  Equipment/Supplies/Services:  yes (Comment: Pt reports that she would like to receive inpt physical therapy due to weakness. )  PHQ:  Yes  Fall Risk:  Pos         Financial: Yes       OPCM Identified Disease Education Barriers:  Hypertension:  Pos  Anemia:  Neg          Short Term Goals  Patient/caregiver will verbalize 2 risk factors of Ineffective coping within 1 month.  Interventions   · Assess patient's ability to perform ADLs.  · Assess type of communication barriers.  · Collaborate with Physician as appropriate to meet patient needs.  · Provide contact information for 24 hour Crisis  Line number- [facility] LINE.  · Recognize and provide educational material (REMEDIOS).  · Refer to Outpatient Case Management Social Worker.     Status  · Partially met 12/4/19      Patient/caregiver will verbalize 2 strategies to effectivecoping strategies within 1 month.  Interventions   · Assess type of communication barriers.  · Collaborate with Physician as appropriate to meet patient needs.  · Facilitate referral for counseling.  · Provide contact information for 24 hour Crisis Line number- [facility] LINE.  · Recognize and provide educational material (REMEDIOS).  · Refer to Outpatient Case Management Social Worker.     Status  · Partially met           Clinical Reference Documents Added to Patient Instructions       Document    CANCER, RESOURCES FOR PEOPLE WITH (ENGLISH)    FALLS IN THE HOME, PREVENTING (ENGLISH)    FALLS, PREVENTING, MAKE YOUR HEALTH A PRIORITY (ENGLISH)    ONCOLOGY: COMMUNICATING WITH OTHERS  (ENGLISH)    WEAKNESS (UNCERTAIN CAUSE) (ENGLISH)             Patient/caregiver will accept life style changes to manage and improve risk of Falls prior to discharge from OPCM. - Priority: High      Overall Time to Completion  2 months from 11/20/2019     OPCM Identified Patient Barriers:  Advanced Care Planning:  No  Nutrition:  no  Housing:  no  Medication Adherence:  Yes  Lab Adherence:  no  Cognitive/Behavioral Health:  no  Communication:  no  Health Literacy:  yes  Equipment/Supplies/Services:  yes (Comment: Pt reports that she would like to receive inpt physical therapy due to weakness. )  Culural/Yazidism Beliefs:  no  PHQ:  Yes  Fall Risk:  Pos         Financial: Yes       OPCM Identified Disease Education Barriers:  Hypertension:  Pos  Anemia:  Neg          Short Term Goals  Patient/caregiver will verbalize 2 risk factors of Falls within 1 month.  Interventions   · Assess patient's ability to perform ADLs.  · Collaborate with Physician as appropriate to meet patient needs.  · Discuss alternate Levels of  Care with patient/caregiver.  · Encourage Exercise.  · Facilitate Home Health Services.  · Facilitate referral to Outpatient Rehab.  · Recognize and provide educational material ([employer]MEDIOS).  · Refer to Outpatient Case Management Social Worker.     Status  · Partially met      Patient/caregiver will verbalize 2 strategies to rest breaks and manage fatigue within 1 month.  Interventions   · Assess patient's ability to perform ADLs.  · Collaborate with Physician as appropriate to meet patient needs.  · Recognize and provide educational material ([employer]OS).     Status  · Partially met           Clinical Reference Documents Added to Patient Instructions       Document    CANCER, RESOURCES FOR PEOPLE WITH (ENGLISH)    FALLS IN THE HOME, PREVENTING (ENGLISH)    FALLS, PREVENTING, MAKE YOUR HEALTH A PRIORITY (ENGLISH)    ONCOLOGY: COMMUNICATING WITH OTHERS  (ENGLISH)    WEAKNESS (UNCERTAIN CAUSE) (ENGLISH)             Patient/caregiver will have knowledge of resources available in order to obtain the services that are needed prior to discharge from OPC. - Priority: Low      Overall Time to Completion  1 month from 11/26/2019    Social Work Identified Patient Barriers:   Cognitive:  No  Support:  No  Advanced Care Planning:  Yes; deferred; (pt declines)  Mental Health:  No       Transportation: Yes; care plan created  Financial: Yes; Care plan created      Short Term Goals  Patient/caregiver will identify 2 community resources to Depression and Cancer Diagnosis within 1 month.  Interventions   · Collaborate with OPCM RN as appropriate to meet patient needs.  · Collaborate with physician as appropriate to meet patient needs  · Discuss and provide CAGNO application.  · Discuss transportation options.  · Provide support group information.  · Provide transportation resource(s).  · Refer to community resource(s).   · 11/26/19: Possible SNF Placement.  · 12/5/19: PCP placed order for outpatient PT/OT     Status  · Partially  met    Patient/Caregiver agrees to go to [place] Box by next follow up.  Patient/Caregiver agrees to OPCM follow up within two weeks to assess progress to goal.               Patient Instructions     Instructions were provided via the [employer] patient resources and are available for the patient to view on the patient portal.    Follow up in about 16 days (around 12/19/2019) for Follow up call with .    Todays OPCM Self-Management Care Plan was developed with the patients/caregivers input and was based on identified barriers from todays assessment.  Goals were written today with the patient/caregiver and the patient has agreed to work towards these goals to improve his/her overall well-being. Patient verbalized understanding of the care plan, goals, and all of today's instructions. Encouraged patient/caregiver to communicate with his/her physician and health care team about health conditions and the treatment plan.  Provided my contact information today and encouraged patient/caregiver to call me with any questions as needed.

## 2019-12-05 NOTE — TELEPHONE ENCOUNTER
Spoke with son of this pt and she was having transportation issues and the son said that he can have her here within the hour. Discuss this with [facility] and they said they will work with her. Franklin verbalized understanding

## 2019-12-06 ENCOUNTER — HOSPITAL ENCOUNTER (OUTPATIENT)
Dept: RADIOLOGY | Facility: HOSPITAL | Age: 69
Discharge: HOME | End: 2019-12-06
Attending: SURGERY
Payer: MEDICARE

## 2019-12-06 DIAGNOSIS — C7B.8 SECONDARY NEUROENDOCRINE TUMOR OF DISTANT LYMPH NODES: ICD-10-CM

## 2019-12-06 DIAGNOSIS — C7B.8 SECONDARY NEUROENDOCRINE TUMOR OF LIVER: ICD-10-CM

## 2019-12-06 DIAGNOSIS — C7A.092 MALIGNANT CARCINOID TUMOR OF STOMACH: ICD-10-CM

## 2019-12-06 PROCEDURE — 78803 NM TUMOR LOCALIZATION SPECT: ICD-10-PCS | Mod: 26,HCNC,, | Performed by: RADIOLOGY

## 2019-12-06 PROCEDURE — 78803 RP LOCLZJ TUM SPECT 1 AREA: CPT | Mod: 26,HCNC,, | Performed by: RADIOLOGY

## 2019-12-06 PROCEDURE — 78803 RP LOCLZJ TUM SPECT 1 AREA: CPT | Mod: TC,HCNC

## 2019-12-12 ENCOUNTER — OUTPATIENT CASE MANAGEMENT (OUTPATIENT)
Dept: ADMINISTRATIVE | Facility: OTHER | Age: 69
End: 2019-12-12

## 2019-12-12 NOTE — PROGRESS NOTES
Outpatient Care Management  Plan of Care Follow Up Visit    Patient: Patito Domingo  MRN: [id number]  Date of Service: 12/12/2019  Completed by: Padmini Clements RN  Referral Date: 11/13/2019  Program: Case Management (High Risk)    No chief complaint on file.      Brief Summary: Received a call from the pt requesting to have appt for flu shot cancelled for tomorrow. Reports that she is having a lot of pain in both arms. Reports that she has an appt scheduled with a NP on tomorrow due to the arm pain. Reports that she has purchased lisa-caldera and other ointments and have not received any relief from the pain. Pt also reports that she has applied heat and cold to the arm with no relief. This cm informed PCP's staff of pt's request to cancel flu shot scheduled for tomorrow. Appt cancelled per PCP's staff. Pt reports that she was informed that she does not make enough money to get an apartment in the building of her choice. Pt reports that she needs some help with finding an apartment. Collaborated with [facility] to notify of pt's requests for assistance. Educated pt on the risk factors of effective coping.     Patient Summary     Involvement of Care:  Do I have permission to speak with other family members about your care?       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    Patient Reported Labs & Vitals:  1.  Any Patient Reported Labs & Vitals?     2.  Patient Reported Blood Pressure:     3.  Patient Reported Pulse:     4.  Patient Reported Weight (Kg):     5.  Patient Reported Blood Glucose (mg/dl):       Medical History:  Reviewed medical history with patient and/or caregiver    Social History:  Reviewed social history with patient and/or caregiver    Clinical Assessment     Reviewed and provided basic information on available community resources for mental health, transportation, wellness resources, and palliative care programs with patient and/or caregiver.    Complex Care Plan     Care plan was discussed and completed today with input from patient and/or caregiver.    Goals      Patient/caregiver will accept life style changes to manage and improve coping due to Metastatic Carcinoid tumor prior to discharge from [facility]. - Priority: High      Overall Time to Completion  2 months from 11/20/2019     OPCM Identified Patient Barriers:  Medication Adherence:  Yes  Health Literacy:  yes  Equipment/Supplies/Services:  yes (Comment: Pt reports that she would like to receive inpt physical therapy due to weakness. )  PHQ:  Yes  Fall Risk:  Pos         Financial: Yes       OPCM Identified Disease Education Barriers:  Hypertension:  Pos  Anemia:  Neg          Short Term Goals  Patient/caregiver will verbalize 2 risk factors of Ineffective coping within 1 month.  Interventions   · Assess patient's ability to perform ADLs.  · Assess type of communication barriers.  · Collaborate with Physician as appropriate to meet patient needs.  · Provide contact information for 24 hour Crisis Line number- COPE LINE.  · Recognize and provide educational material ([employer]).  · Refer to Outpatient Case Management Social Worker.     Status  · Partially met  12/4/19      Patient/caregiver will verbalize 2 strategies to effectivecoping strategies within 1 month.  Interventions   · Assess type of communication barriers.  · Collaborate with Physician as appropriate to meet patient needs.  · Facilitate referral for counseling.  · Provide contact information for 24 hour Crisis Line number- COPE LINE.  · Recognize and provide educational material ([employer]).  · Refer to Outpatient Case Management Social Worker.     Status  · Partially met           Clinical Reference Documents Added to Patient Instructions       Document    CANCER, RESOURCES FOR PEOPLE WITH (ENGLISH)    FALLS IN THE HOME, PREVENTING (ENGLISH)    FALLS, PREVENTING, MAKE YOUR HEALTH A PRIORITY (ENGLISH)    ONCOLOGY: COMMUNICATING WITH OTHERS  (ENGLISH)    WEAKNESS (UNCERTAIN CAUSE) (ENGLISH)             Patient/caregiver will accept life style changes to manage and improve risk of Falls prior to discharge from [facility]. - Priority: High      Overall Time to Completion  2 months from 11/20/2019     OPCM Identified Patient Barriers:  Advanced Care Planning:  No  Nutrition:  no  Housing:  no  Medication Adherence:  Yes  Lab Adherence:  no  Cognitive/Behavioral Health:  no  Communication:  no  Health Literacy:  yes  Equipment/Supplies/Services:  yes (Comment: Pt reports that she would like to receive inpt physical therapy due to weakness. )  Culural/Adventist Beliefs:  no  PHQ:  Yes  Fall Risk:  Pos         Financial: Yes       OPCM Identified Disease Education Barriers:  Hypertension:  Pos  Anemia:  Neg          Short Term Goals  Patient/caregiver will verbalize 2 risk factors of Falls within 1 month.  Interventions   · Assess patient's ability to perform ADLs.  · Collaborate with Physician as appropriate to meet patient needs.  · Discuss alternate Levels of Care with patient/caregiver.  · Encourage Exercise.  · Facilitate Home Health Services.  · Facilitate referral to Outpatient Rehab.  · Recognize and provide  educational material ([employer]).  · Refer to Outpatient Case Management Social Worker.     Status  · Partially met      Patient/caregiver will verbalize 2 strategies to rest breaks and manage fatigue within 1 month.  Interventions   · Assess patient's ability to perform ADLs.  · Collaborate with Physician as appropriate to meet patient needs.  · Recognize and provide educational material ([employer]).     Status  · Partially met           Clinical Reference Documents Added to Patient Instructions       Document    CANCER, RESOURCES FOR PEOPLE WITH (ENGLISH)    FALLS IN THE HOME, PREVENTING (ENGLISH)    FALLS, PREVENTING, MAKE YOUR HEALTH A PRIORITY (ENGLISH)    ONCOLOGY: COMMUNICATING WITH OTHERS  (ENGLISH)    WEAKNESS (UNCERTAIN CAUSE) (ENGLISH)             Patient/caregiver will have knowledge of resources available in order to obtain the services that are needed prior to discharge from [facility]. - Priority: Low      Overall Time to Completion  1 month from 11/26/2019    Social Work Identified Patient Barriers:   Cognitive:  No  Support:  No  Advanced Care Planning:  Yes; deferred; (pt declines)  Mental Health:  No       Transportation: Yes; care plan created  Financial: Yes; Care plan created      Short Term Goals  Patient/caregiver will identify 2 community resources to Depression and Cancer Diagnosis within 1 month.  Interventions   · Collaborate with OPCM RN as appropriate to meet patient needs.  · Collaborate with physician as appropriate to meet patient needs  · Discuss and provide CAGNO application.  · Discuss transportation options.  · Provide support group information.  · Provide transportation resource(s).  · Refer to community resource(s).   · 11/26/19: Possible SNF Placement.  · 12/5/19: PCP placed order for outpatient PT/OT     Status  · Partially met    Patient/Caregiver agrees to go to [facility] by next follow up.  Patient/Caregiver agrees to OPCM follow up within two weeks to assess progress to goal.                Patient Instructions     Instructions were provided via the [employer] patient resources and are available for the patient to view on the patient portal.    Next Steps: Will educate pt on the strategies of effective coping.    No follow-ups on file.      Todays OPCM Self-Management Care Plan was developed with the patients/caregivers input and was based on identified barriers from todays assessment.  Goals were written today with the patient/caregiver and the patient has agreed to work towards these goals to improve his/her overall well-being. Patient verbalized understanding of the care plan, goals, and all of today's instructions. Encouraged patient/caregiver to communicate with his/her physician and health care team about health conditions and the treatment plan.  Provided my contact information today and encouraged patient/caregiver to call me with any questions as needed.

## 2019-12-13 ENCOUNTER — OFFICE VISIT (OUTPATIENT)
Dept: FAMILY MEDICINE | Facility: CLINIC | Age: 69
End: 2019-12-13
Payer: MEDICARE

## 2019-12-13 VITALS
BODY MASS INDEX: 30.17 KG/M2 | HEART RATE: 61 BPM | SYSTOLIC BLOOD PRESSURE: 152 MMHG | WEIGHT: 187.75 LBS | TEMPERATURE: 98 F | OXYGEN SATURATION: 97 % | DIASTOLIC BLOOD PRESSURE: 58 MMHG | HEIGHT: 66 IN

## 2019-12-13 DIAGNOSIS — M75.41 IMPINGEMENT SYNDROME OF RIGHT SHOULDER: ICD-10-CM

## 2019-12-13 DIAGNOSIS — I10 ESSENTIAL HYPERTENSION: Chronic | ICD-10-CM

## 2019-12-13 DIAGNOSIS — M25.511 ACUTE PAIN OF RIGHT SHOULDER: Primary | ICD-10-CM

## 2019-12-13 PROCEDURE — 99214 PR OFFICE/OUTPT VISIT, EST, LEVL IV, 30-39 MIN: ICD-10-PCS | Mod: HCNC,S$GLB,, | Performed by: NURSE PRACTITIONER

## 2019-12-13 PROCEDURE — 99999 PR PBB SHADOW E&M-EST. PATIENT-LVL V: ICD-10-PCS | Mod: PBBFAC,HCNC,, | Performed by: NURSE PRACTITIONER

## 2019-12-13 PROCEDURE — 99214 OFFICE O/P EST MOD 30 MIN: CPT | Mod: HCNC,S$GLB,, | Performed by: NURSE PRACTITIONER

## 2019-12-13 PROCEDURE — 99999 PR PBB SHADOW E&M-EST. PATIENT-LVL V: CPT | Mod: PBBFAC,HCNC,, | Performed by: NURSE PRACTITIONER

## 2019-12-13 RX ORDER — METHOCARBAMOL 500 MG/1
500 TABLET, FILM COATED ORAL 4 TIMES DAILY
Qty: 40 TABLET | Refills: 0 | Status: SHIPPED | OUTPATIENT
Start: 2019-12-13 | End: 2019-12-23

## 2019-12-13 RX ORDER — METHYLPREDNISOLONE 4 MG/1
TABLET ORAL
Qty: 1 PACKAGE | Refills: 0 | Status: ON HOLD | OUTPATIENT
Start: 2019-12-13 | End: 2020-01-14 | Stop reason: ALTCHOICE

## 2019-12-13 NOTE — PATIENT INSTRUCTIONS
Shoulder Pain with Uncertain Cause  Shoulder pain can have many causes. Pain often comes from the structures that surround the shoulder joint. These are the joint capsule, ligaments, tendons, muscles, and bursa. Pain can also come from cartilage in the joint. Cartilage can become worn out or injured. Its important to know whats causing your pain so the healthcare provider can use the correct treatment. But sometimes its difficult to find the exact cause of shoulder pain. You may need to see a specialist (orthopedist). You may also need special tests such as a CT scan or MRI. The provider may need to use special tools to look inside the joint (arthroscopy).  Shoulder pain can be treated with a sling or a device that keeps your shoulder from moving. You can take an anti-inflammatory medicine such as ibuprofen to ease pain. You may need to do special shoulder exercises. Follow up with a specialist if the pain is severe or doesnt go away after a few weeks.  Home care  Follow these tips when caring for yourself at home:  · If a sling was given to you, leave it in place for the time advised by your healthcare provider. If you arent sure how long to wear it, ask for advice. If the sling becomes loose, adjust it so that your forearm is level with the ground. Your shoulder should feel well supported.  · Put an ice pack on the injured area for 20 minutes every 1 to 2 hours the first day. You can make your own ice pack by putting ice cubes in a plastic bag. Wrap the bag in a thin towel. Continue with ice packs 3 to 4 times a day for the next 2 days. Then use the pack as needed to ease pain and swelling.  · You may use acetaminophen or ibuprofen to control pain, unless another pain medicine was prescribed. If you have chronic liver or kidney disease, talk with your healthcare provider before using these medicines. Also talk with your provider if youve ever had a stomach ulcer or GI bleeding.  · Shoulder pain may seem  worse at night, when there is less to distract you from the pain. If you sleep on your side, try to keep weight off your painful shoulder. Propping pillows behind you may stop you from rolling over onto that shoulder during sleep.   · Shoulder and elbow joints can become stiff if left in a sling for too long. You should start range of motion exercises about 7 to 10 days after the injury. Talk with your provider to find out what type of exercises to do and how soon to start.  · You can take the sling off to shower or bathe.  Follow-up care  Follow up with your healthcare provider if you dont start to get better in the next 5 days.  When to seek medical advice  Call your healthcare provider right away if any of these occur:  · Pain or swelling gets worse or continues for more than a few days  · Your hand or fingers become cold, blue, numb, or tingly  · Large amount of bruising on your shoulder or upper arm  · Difficulty moving your hand or fingers  · Weakness in your hand or fingers  · Your shoulder becomes stiff  · It feels like your shoulder is popping out  · You are less able to do your daily activities  Date Last Reviewed: 10/1/2016  © 2272-0252 [employer]. [address] [place] [address], [place] [address]. All rights reserved. This information is not intended as a substitute for professional medical care. Always follow your healthcare professional's instructions.

## 2019-12-13 NOTE — PROGRESS NOTES
Subjective:       Patient ID: Patito Domingo is a 69 y.o. female.    Chief Complaint: Arm Pain    Shoulder Pain    The pain is present in the right shoulder. This is a new problem. The current episode started 1 to 4 weeks ago. There has been no history of extremity trauma. The problem occurs constantly. The problem has been unchanged. The quality of the pain is described as aching. The pain is at a severity of 10/10. The pain is moderate. Associated symptoms include a limited range of motion. Pertinent negatives include no fever, headaches, inability to bear weight, itching, joint locking, joint swelling, numbness, stiffness, tingling or visual symptoms. The symptoms are aggravated by activity. The treatment provided moderate relief.     Review of Systems   Constitutional: Negative for chills, diaphoresis, fatigue and fever.   Respiratory: Negative for cough, chest tightness, shortness of breath and wheezing.    Cardiovascular: Negative for chest pain, palpitations and leg swelling.   Gastrointestinal: Negative for abdominal pain, constipation, diarrhea, nausea and vomiting.   Genitourinary: Negative for dysuria, vaginal discharge and vaginal pain.   Musculoskeletal: Positive for arthralgias (R shoulder). Negative for back pain, joint swelling, myalgias and stiffness.   Skin: Negative for color change, itching and rash.   Neurological: Negative for dizziness, tingling, weakness, light-headedness, numbness and headaches.       Objective:      Physical Exam   Constitutional: She appears well-developed. No distress.   HENT:   Head: Normocephalic and atraumatic.   Cardiovascular: Normal rate.   Pulmonary/Chest: Effort normal.   Musculoskeletal: She exhibits tenderness (R anterior/posterior glenohumeral tenderness). She exhibits no edema or deformity.   Positive Neer/Staton sign of RUE     Neurological: She is alert.   Skin: Skin is warm and dry.   Irregular erythematous patch of left shoulder region   Psychiatric:  She has a normal mood and affect. Her behavior is normal.   Vitals reviewed.      Assessment:       1. Acute pain of right shoulder    2. Impingement syndrome of right shoulder    3. Essential hypertension        Plan:       Violet was seen today for arm pain.    Diagnoses and all orders for this visit:    Acute pain of right shoulder  -     methocarbamol (ROBAXIN) 500 MG Tab; Take 1 tablet (500 mg total) by mouth 4 (four) times daily. for 10 days  -     methylPREDNISolone (MEDROL DOSEPACK) 4 mg tablet; use as directed    Impingement syndrome of right shoulder  -     methocarbamol (ROBAXIN) 500 MG Tab; Take 1 tablet (500 mg total) by mouth 4 (four) times daily. for 10 days  -     methylPREDNISolone (MEDROL DOSEPACK) 4 mg tablet; use as directed  Home care  Follow these tips when caring for yourself at home:  · If a sling was given to you, leave it in place for the time advised by your healthcare provider. If you arent sure how long to wear it, ask for advice. If the sling becomes loose, adjust it so that your forearm is level with the ground. Your shoulder should feel well supported.  · Put an ice pack on the injured area for 20 minutes every 1 to 2 hours the first day. You can make your own ice pack by putting ice cubes in a plastic bag. Wrap the bag in a thin towel. Continue with ice packs 3 to 4 times a day for the next 2 days. Then use the pack as needed to ease pain and swelling.  · You may use acetaminophen or ibuprofen to control pain, unless another pain medicine was prescribed. If you have chronic liver or kidney disease, talk with your healthcare provider before using these medicines. Also talk with your provider if youve ever had a stomach ulcer or GI bleeding.  · Shoulder pain may seem worse at night, when there is less to distract you from the pain. If you sleep on your side, try to keep weight off your painful shoulder. Propping pillows behind you may stop you from rolling over onto that shoulder  during sleep.   · Shoulder and elbow joints can become stiff if left in a sling for too long. You should start range of motion exercises about 7 to 10 days after the injury. Talk with your provider to find out what type of exercises to do and how soon to start.  · You can take the sling off to shower or bathe.  Follow-up care  Follow up with your healthcare provider if you dont start to get better in the next 5 days.  When to seek medical advice  Call your healthcare provider right away if any of these occur:  · Pain or swelling gets worse or continues for more than a few days  · Your hand or fingers become cold, blue, numb, or tingly  · Large amount of bruising on your shoulder or upper arm  · Difficulty moving your hand or fingers  · Weakness in your hand or fingers  · Your shoulder becomes stiff  · It feels like your shoulder is popping out  · You are less able to do your daily activities  Date Last Reviewed: 10/1/2016  © 9647-5274 [employer] [address] [place] [address], [place] [address]. All rights reserved. This information is not intended as a substitute for professional medical care. Always follow your healthcare professional's instructions.        Essential hypertension  The current medical regimen is effective;  continue present plan and medications.    Discussed sodium restriction, maintaining ideal body weight and regular exercise program as physiologic means to achieve blood pressure control. The patient will strive towards this. The current medical regimen is effective; continue present plan and medications. Recommended patient to check home readings to monitor and see me for followup as scheduled or sooner as needed. Patient was educated that both decongestant and anti-inflammatory medication may raise blood pressure

## 2019-12-16 ENCOUNTER — TELEPHONE (OUTPATIENT)
Dept: NEUROLOGY | Facility: HOSPITAL | Age: 69
End: 2019-12-16

## 2019-12-16 NOTE — TELEPHONE ENCOUNTER
Spoke with pt and her c/o cold, flu and diarrhea. She was told to go to ER, urgent care or PCP. She has an appt with Dr. Perry 12/19/19. Told her he has no opening today and is not in clinic until Thursday and she has an appt then. Pt verbalized understanding

## 2019-12-16 NOTE — TELEPHONE ENCOUNTER
----- Message from Christina Oreilly sent at 12/16/2019 10:33 AM CST -----  Contact: Celeste dumont/ Kei Vaughn  [phone]  BRIAN----Pt is requesting a callback in regards to wanting to visit with the doctor sooner than 12/18/2019 due to having a cold and want o make sure that it's not the Flu.  Pt stated that she have diarrhea and in pain    Please call and advise.

## 2019-12-16 NOTE — TELEPHONE ENCOUNTER
----- Message from Alexis Hernandez sent at 12/16/2019  9:39 AM CST -----  Contact: patient  Patient called to speak with the nurse about a very important matter.  She is crying and says she is very nervous and scared right now.    Please call [phone] as soon as possible.

## 2019-12-18 ENCOUNTER — TELEPHONE (OUTPATIENT)
Dept: NEUROLOGY | Facility: HOSPITAL | Age: 69
End: 2019-12-18

## 2019-12-18 NOTE — TELEPHONE ENCOUNTER
Spoke with pt and she is having watery stools and she was about to reschedule her appt tomorrow and I told her that I did not having any other appt available. I called Dr. Perry to get instruction on what to tell pt to help stop her diarrhea. Instruction was Metamucil 2 tsp 3x daily and Imodium 2 tab and 4 hr later take Lomotil. And to alternate the Imodium with the Lomotil q 4 hr til she goes to bed. Pt verbalized understanding. She will call in the morning to give update.

## 2019-12-19 ENCOUNTER — OUTPATIENT CASE MANAGEMENT (OUTPATIENT)
Dept: ADMINISTRATIVE | Facility: OTHER | Age: 69
End: 2019-12-19

## 2019-12-20 NOTE — PROGRESS NOTES
Outpatient Care Management   - Care Plan Follow Up    Patient: Patito Domingo  MRN:  [id number]  Date of Service:  12/20/2019  Completed by:  Alyson Montalvo LMSW  Referral Date: 11/13/2019  Program: Case Management (High Risk)    Reason for Visit   Patient presents with    OPCM Chart Review    Update Plan Of Care     SUMMARY:   LMSW notified by RN patient requesting call about housing resources. LMSW completed follow up with patient. She stated she was denied for an apartment because of her income. She stated that this was a regular apartment not low-income or senior, they explained to her she needed to make 2xs the rent. She stated the rent was only $700. I explained to her that she would need to look for low-income or senior because she is only make $950 monthly. I also explained it may be hard for her to afford rent and bills if not. She stated she applied for [employer] and [place] but hasn't heard anything. I explained to patient about the long waitlist. She stated she knew but that her son has been frustrating her and she just needs a place. I told pt I would send resources for her to review but nothing would be a quick fix. Patient in agreement with  mailing resources. No other concerns were voiced at this time. Patient in agreement, LMSW will follow up with patient within two weeks to evaluate progress towards goals.     Complex Care Plan     Care plan was discussed and completed today with input from patient and/or caregiver.    Goals      Patient/caregiver will accept life style changes to manage and improve coping due to Metastatic Carcinoid tumor prior to discharge from OPCM. - Priority: High      Overall Time to Completion  2 months from 11/20/2019     OPC Identified Patient Barriers:  Medication Adherence:  Yes  Health Literacy:  yes  Equipment/Supplies/Services:  yes (Comment: Pt reports that she would like to receive inpt physical therapy due to weakness. )  PHQ:   Yes  Fall Risk:  Pos         Financial: Yes       OPCM Identified Disease Education Barriers:  Hypertension:  Pos  Anemia:  Neg          Short Term Goals  Patient/caregiver will verbalize 2 risk factors of Ineffective coping within 1 month.  Interventions   · Assess patient's ability to perform ADLs.  · Assess type of communication barriers.  · Collaborate with Physician as appropriate to meet patient needs.  · Provide contact information for 24 hour Crisis Line number- COPE LINE.  · Recognize and provide educational material (KRAMES).  · Refer to Outpatient Case Management Social Worker.     Status  · Met12/4/19, 12/12/19      Patient/caregiver will verbalize 2 strategies to effectivecoping strategies within 1 month.  Interventions   · Assess type of communication barriers.  · Collaborate with Physician as appropriate to meet patient needs.  · Facilitate referral for counseling.  · Provide contact information for 24 hour Crisis Line number- COPE LINE.  · Recognize and provide educational material (KRAMES).  · Refer to Outpatient Case Management Social Worker.     Status  · Partially met           Clinical Reference Documents Added to Patient Instructions       Document    CANCER, RESOURCES FOR PEOPLE WITH (ENGLISH)    FALLS IN THE HOME, PREVENTING (ENGLISH)    FALLS, PREVENTING, MAKE YOUR HEALTH A PRIORITY (ENGLISH)    ONCOLOGY: COMMUNICATING WITH OTHERS  (ENGLISH)    WEAKNESS (UNCERTAIN CAUSE) (ENGLISH)             Patient/caregiver will accept life style changes to manage and improve risk of Falls prior to discharge from OPCM. - Priority: High      Overall Time to Completion  2 months from 11/20/2019     OPCM Identified Patient Barriers:    Medication Adherence:  Yes    Equipment/Supplies/Services: Included in care plan (Comment: Pt reports that she would like to receive inpt physical therapy due to weakness. )    PHQ:  Care plan created  Fall Risk: Care plan created         Financial: Yes       OPCM Identified  Disease Education Barriers:  Hypertension:  Pos  Anemia:  Neg          Short Term Goals  Patient/caregiver will verbalize 2 risk factors of Falls within 1 month.  Interventions   · Assess patient's ability to perform ADLs.  · Collaborate with Physician as appropriate to meet patient needs.  · Discuss alternate Levels of Care with patient/caregiver.  · Encourage Exercise.  · Facilitate Home Health Services.  · Facilitate referral to Outpatient Rehab.  · Recognize and provide educational material (REMEDIOS).  · Refer to Outpatient Case Management Social Worker.     Status  · Partially met      Patient/caregiver will verbalize 2 strategies to rest breaks and manage fatigue within 1 month.  Interventions   · Assess patient's ability to perform ADLs.  · Collaborate with Physician as appropriate to meet patient needs.  · Recognize and provide educational material (REMEDIOS).     Status  · Partially met           Clinical Reference Documents Added to Patient Instructions       Document    CANCER, RESOURCES FOR PEOPLE WITH (ENGLISH)    FALLS IN THE HOME, PREVENTING (ENGLISH)    FALLS, PREVENTING, MAKE YOUR HEALTH A PRIORITY (ENGLISH)    ONCOLOGY: COMMUNICATING WITH OTHERS  (ENGLISH)    WEAKNESS (UNCERTAIN CAUSE) (ENGLISH)             Patient/caregiver will have knowledge of resources available in order to obtain the services that are needed prior to discharge from OPCM. - Priority: Low      Overall Time to Completion  1 month from 11/26/2019    Social Work Identified Patient Barriers:   Cognitive:  No  Support:  No  Advanced Care Planning:  Yes; deferred; (pt declines)  Mental Health:  No       Transportation: Yes; care plan created  Financial: Yes; Care plan created      Short Term Goals  Patient/caregiver will identify 2 community resources to Depression and Cancer Diagnosis within 1 month.  Interventions   · Collaborate with OPCM RN as appropriate to meet patient needs.  · Collaborate with physician as appropriate to meet  patient needs  · Discuss and provide CAGNO application.  · Discuss transportation options.  · Provide support group information.  · Provide transportation resource(s).  · Refer to community resource(s).   · 11/26/19: Possible SNF Placement.  · 12/5/19: PCP placed order for outpatient PT/OT  · 12/20/19: SW provided housing resources for pt.      Status  · Partially met    Patient/Caregiver agrees to go review housing resources by next follow up.  Patient/Caregiver agrees to OPCM follow up within two weeks to assess progress to goal.               Patient Instructions     Instructions were provided via the [employer] patient resources and are available for the patient to view on the patient portal.    Follow up in about 12 days (around 12/31/2019) for Follow up call with .    Todays OPCM Self-Management Care Plan was developed with the patients/caregivers input and was based on identified barriers from todays assessment.  Goals were written today with the patient/caregiver and the patient has agreed to work towards these goals to improve his/her overall well-being. Patient verbalized understanding of the care plan, goals, and all of today's instructions. Encouraged patient/caregiver to communicate with his/her physician and health care team about health conditions and the treatment plan.  Provided my contact information today and encouraged patient/caregiver to call me with any questions as needed.

## 2019-12-22 DIAGNOSIS — L24.9 IRRITANT CONTACT DERMATITIS, UNSPECIFIED TRIGGER: ICD-10-CM

## 2019-12-23 RX ORDER — HYDROCORTISONE VALERATE 2 MG/G
OINTMENT TOPICAL 2 TIMES DAILY
Qty: 60 G | Refills: 0 | Status: SHIPPED | OUTPATIENT
Start: 2019-12-23 | End: 2020-01-23

## 2019-12-30 ENCOUNTER — OUTPATIENT CASE MANAGEMENT (OUTPATIENT)
Dept: ADMINISTRATIVE | Facility: OTHER | Age: 69
End: 2019-12-30

## 2019-12-30 DIAGNOSIS — I1A.0 RESISTANT HYPERTENSION: Chronic | ICD-10-CM

## 2019-12-30 RX ORDER — LOSARTAN POTASSIUM 50 MG/1
50 TABLET ORAL DAILY
Qty: 30 TABLET | Refills: 5 | Status: SHIPPED | OUTPATIENT
Start: 2019-12-30 | End: 2020-02-05 | Stop reason: SDUPTHER

## 2019-12-31 ENCOUNTER — EXTERNAL CHRONIC CARE MANAGEMENT (OUTPATIENT)
Dept: PRIMARY CARE CLINIC | Facility: CLINIC | Age: 69
End: 2019-12-31
Payer: MEDICARE

## 2019-12-31 PROCEDURE — 99487 PR COMPLX CHRON CARE MGMT, 1ST HR, PER MONTH: ICD-10-PCS | Mod: S$GLB,,, | Performed by: INTERNAL MEDICINE

## 2019-12-31 PROCEDURE — 99487 CPLX CHRNC CARE 1ST 60 MIN: CPT | Mod: S$GLB,,, | Performed by: INTERNAL MEDICINE

## 2020-01-02 NOTE — PROGRESS NOTES
Outpatient Care Management   - Care Plan Follow Up    Patient: Patito Domingo  MRN:  [id number]  Date of Service:  1/2/2020  Completed by:  Alyson Montalvo LMSW  Referral Date: 11/13/2019  Program: Case Management (High Risk)    Reason for Visit   Patient presents with    OPCM Chart Review    OPCM  First Follow-up Attempt     12-30-19    Update Plan Of Care     SUMMARY:   LMSW completed follow up with patient. She stated she wasn't feeling good that her stomach has been bothering her all day. She stated she hasn't been looking into other resources because of how she has been feeling. Told patient LMSW would follow up with her in one week. No other concerns were voiced at this time. Patient in agreement, LMSW will follow up with patient within one week to evaluate progress towards goals.         Complex Care Plan     Care plan was discussed and completed today with input from patient and/or caregiver.    Goals      Patient/caregiver will accept life style changes to manage and improve coping due to Metastatic Carcinoid tumor prior to discharge from OPCM. - Priority: High      Overall Time to Completion  2 months from 11/20/2019     OPCM Identified Patient Barriers:  Medication Adherence:  Yes  Health Literacy:  yes  Equipment/Supplies/Services:  yes (Comment: Pt reports that she would like to receive inpt physical therapy due to weakness. )  PHQ:  Yes  Fall Risk:  Pos         Financial: Yes       OPCM Identified Disease Education Barriers:  Hypertension:  Pos  Anemia:  Neg          Short Term Goals  Patient/caregiver will verbalize 2 risk factors of Ineffective coping within 1 month.  Interventions   · Assess patient's ability to perform ADLs.  · Assess type of communication barriers.  · Collaborate with Physician as appropriate to meet patient needs.  · Provide contact information for 24 hour Crisis Line number- COPE LINE.  · Recognize and provide educational material (REMEDIOS).  · Refer to  Outpatient Case Management Social Worker.     Status  · Met12/4/19, 12/12/19      Patient/caregiver will verbalize 2 strategies to effectivecoping strategies within 1 month.  Interventions   · Assess type of communication barriers.  · Collaborate with Physician as appropriate to meet patient needs.  · Facilitate referral for counseling.  · Provide contact information for 24 hour Crisis Line number- COPE LINE.  · Recognize and provide educational material (REMEDIOS).  · Refer to Outpatient Case Management Social Worker.     Status  · Partially met           Clinical Reference Documents Added to Patient Instructions       Document    CANCER, RESOURCES FOR PEOPLE WITH (ENGLISH)    FALLS IN THE HOME, PREVENTING (ENGLISH)    FALLS, PREVENTING, MAKE YOUR HEALTH A PRIORITY (ENGLISH)    ONCOLOGY: COMMUNICATING WITH OTHERS  (ENGLISH)    WEAKNESS (UNCERTAIN CAUSE) (ENGLISH)             Patient/caregiver will accept life style changes to manage and improve risk of Falls prior to discharge from OPCM. - Priority: High      Overall Time to Completion  2 months from 11/20/2019     OPCM Identified Patient Barriers:    Medication Adherence:  Yes    Equipment/Supplies/Services: Included in care plan (Comment: Pt reports that she would like to receive inpt physical therapy due to weakness. )    PHQ:  Care plan created  Fall Risk: Care plan created         Financial: Yes       OPCM Identified Disease Education Barriers:  Hypertension:  Pos  Anemia:  Neg          Short Term Goals  Patient/caregiver will verbalize 2 risk factors of Falls within 1 month.  Interventions   · Assess patient's ability to perform ADLs.  · Collaborate with Physician as appropriate to meet patient needs.  · Discuss alternate Levels of Care with patient/caregiver.  · Encourage Exercise.  · Facilitate Home Health Services.  · Facilitate referral to Outpatient Rehab.  · Recognize and provide educational material (REMEDIOS).  · Refer to Outpatient Case Management  .     Status  · Partially met      Patient/caregiver will verbalize 2 strategies to rest breaks and manage fatigue within 1 month.  Interventions   · Assess patient's ability to perform ADLs.  · Collaborate with Physician as appropriate to meet patient needs.  · Recognize and provide educational material (REMEDIOS).     Status  · Partially met           Clinical Reference Documents Added to Patient Instructions       Document    CANCER, RESOURCES FOR PEOPLE WITH (ENGLISH)    FALLS IN THE HOME, PREVENTING (ENGLISH)    FALLS, PREVENTING, MAKE YOUR HEALTH A PRIORITY (ENGLISH)    ONCOLOGY: COMMUNICATING WITH OTHERS  (ENGLISH)    WEAKNESS (UNCERTAIN CAUSE) (ENGLISH)             Patient/caregiver will have knowledge of resources available in order to obtain the services that are needed prior to discharge from OPCM. - Priority: Low      Overall Time to Completion  1 month from 11/26/2019    Social Work Identified Patient Barriers:   Cognitive:  No  Support:  No  Advanced Care Planning:  Yes; deferred; (pt declines)  Mental Health:  No       Transportation: Yes; care plan created  Financial: Yes; Care plan created      Short Term Goals  Patient/caregiver will identify 2 community resources to Depression and Cancer Diagnosis within 1 month.  Interventions   · Collaborate with OPCM RN as appropriate to meet patient needs.  · Collaborate with physician as appropriate to meet patient needs  · Discuss and provide CAGNO application.  · Discuss transportation options.  · Provide support group information.  · Provide transportation resource(s).  · Refer to community resource(s).   · 11/26/19: Possible SNF Placement.  · 12/5/19: PCP placed order for outpatient PT/OT  · 12/20/19: SW provided housing resources for pt.      Status  · Partially met    Patient/Caregiver agrees to go review housing resources by next follow up.  Patient/Caregiver agrees to OPCM follow up within two weeks to assess progress to goal.                Patient Instructions     Instructions were provided via the [employer] patient resources and are available for the patient to view on the patient portal.    Follow up in about 10 days (around 1/9/2020) for Follow up call with .    Todays OPCM Self-Management Care Plan was developed with the patients/caregivers input and was based on identified barriers from todays assessment.  Goals were written today with the patient/caregiver and the patient has agreed to work towards these goals to improve his/her overall well-being. Patient verbalized understanding of the care plan, goals, and all of today's instructions. Encouraged patient/caregiver to communicate with his/her physician and health care team about health conditions and the treatment plan.  Provided my contact information today and encouraged patient/caregiver to call me with any questions as needed.

## 2020-01-07 ENCOUNTER — CLINICAL SUPPORT (OUTPATIENT)
Dept: REHABILITATION | Facility: HOSPITAL | Age: 70
End: 2020-01-07
Attending: INTERNAL MEDICINE
Payer: MEDICARE

## 2020-01-07 DIAGNOSIS — G89.29 CHRONIC PAIN OF BOTH SHOULDERS: ICD-10-CM

## 2020-01-07 DIAGNOSIS — M25.511 CHRONIC PAIN OF BOTH SHOULDERS: ICD-10-CM

## 2020-01-07 DIAGNOSIS — M25.619 DECREASED RANGE OF MOTION OF SHOULDER, UNSPECIFIED LATERALITY: ICD-10-CM

## 2020-01-07 DIAGNOSIS — R29.898 WEAKNESS OF SHOULDER: ICD-10-CM

## 2020-01-07 DIAGNOSIS — M25.512 CHRONIC PAIN OF BOTH SHOULDERS: ICD-10-CM

## 2020-01-07 PROCEDURE — 97110 THERAPEUTIC EXERCISES: CPT | Mod: HCNC,PN

## 2020-01-07 PROCEDURE — 97166 OT EVAL MOD COMPLEX 45 MIN: CPT | Mod: HCNC,PN

## 2020-01-07 NOTE — PLAN OF CARE
[facility] Occupational Therapy  Initial Evaluation     Date: 1/7/2020  Name: Patito Domingo  Clinic Number: [id number]    Therapy Diagnosis: Pain and weakness  Physician: Pantera Rosen MD    Physician Orders: Eval and Treat Impingement R shoulder  Medical Diagnosis: R shoulder impingement syndrome  Surgical Procedure and Date: NA,   Evaluation Date: 1/7/2020  Insurance Authorization Period Expiration: 1 Exp 12/31/2020  Plan of Care Certification Period: 2/28/2020  Date of Return to MD: 3/2/2020    Visit # / Visits authorized: 1  Time In:12 00 PM  Time Out: 1:00 PM  Total Billable Time: 60 minutes    Precautions:  Standard and cancer    Subjective     Involved Side: Right shoulder  Dominant Side: Left  Date of Onset: Couple of months  History of Current Condition/Mechanism of Injury: Just started hurting one day. Fell in 2017  Still going through Chemo for carcinoma  Imaging: X ray Ne for fracture  Previous Therapy: Did some therapy after her fall.    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    Patito Domingo  has a past surgical history that includes Liver biopsy (9/14); Lithotripsy; cystoscope; Hysterectomy (5/1996); Colon surgery; Eye surgery; Cataract extraction (Left, 10/2017); Cholecystectomy; Cystoscopy w/ retrogrades (Right, 10/10/2019); and Ureteroscopy (Right, 10/10/2019).    Patito has a current medication list which includes the following prescription(s): bumetanide, clonidine, cyanocobalamin, diclofenac sodium, diphenoxylate-atropine 2.5-0.025 mg, ergocalciferol, ferrous sulfate, hydrocortisone valerate, losartan, magnesium oxide, methylprednisolone, metoprolol tartrate, ondansetron, oxycodone, promethazine, tamsulosin, and trolamine  salicylate.    Review of patient's allergies indicates:   Allergen Reactions    Epinephrine Anaphylaxis     Can cause  a Carcinoid Crisis    Contrast media Hives, Itching and Swelling    Ibuprofen Hives, Itching and Swelling    Iodinated contrast media     Sulfa (sulfonamide antibiotics) Hives, Itching and Swelling        Patient's Goals for Therapy: Get strength back in arm and legs    Pain:  Functional Pain Scale Rating 0-10:   8/10 on average  7/10 at best  10/10 at worst  Location: R shoulder  Description: Aching and Throbbing  Aggravating Factors: sleeping, can just start huting at any time  Easing Factors: heat and aspricream    Occupation:  Retired teacher  Working presently: retired  Duties: NA    Functional Limitations/Social History:    Previous functional status includes: Independent with all ADLs.     Current FunctionalStatus   Home/Living environment : lives with son and his famiily   Uses transportation      Limitation of Functional IStatus as follows:   ADLs/IADLs: (pt able to complete self care but does so with pain and difficulty.    - Feeding: I    - Bathing: I     - Dressing/Grooming: I     - Driving: States not driving rigt now     Leisure: Nothing now, I used to like to jog and go to the gym        Objective       Edema. Not noted      Elbow ROM. Measured in degrees.  Date 1/7/2020 1/7/2020      Left Right   Elbow Ext/Flex 0/135 0/135       Wrist ROM. WNL B      Hand ROM. All digits grossly WNL      Sensation: Appears grossly intact B UE's       Range of Motion Right Shoulder:                                                    ROM Left AROM L PROM Right AROM R PROM   Date: 1/7/2020 1/7/2020      Shoulder Flexion  (175) 110 140 110  135   Shoulder Abduction (175) 90  87    Shoulder Extension (60) 70  80    ER neutral 55 75 70 85   Shoulder External Rotation   @ 90 Abd  (90) 75  80    IR with shoulder adducted and flexed to 90  20  15   Shoulder Internal Rotation  (elbow flex degree 110   85    Horiz. Shoulder Adduction (45) 15  25                                 ROM Comments:  Rom is painful and limited B shoulders. R>L.      STRENGTH:    Left  Right   Date: 1/7/2020 1/7/2020     Flexion 4/5 4/5   Extension 5/5 5/5   Abduction 5/5 5/5   ER 4/5 3+/5   IR 4+/5 4+/5         Right   Date: 1/7/2020     NEER (stabilize sca passive shoulder flexion palm down; pinches RC under coracoacromial arch)/+ pain=impingement)  + Pain   DUQUE (passive sh fl, elbow 90 w/ forceful IR; this drives the greater tuberosity under toe Coracoacromial arch & impinging supraspinatus)  + pain   Painful Arc:      + pain      Pt states that she is having pain in B [facility]         Treatment     Treatment Time In: 1250 pm  Treatment Time Out: 1:00 pm  Total Treatment time separate from Evaluation time:10    Patito received therapeutic exercises for 10 minutes including:  -instruction in ex for R ue ROM and scapular strengthening  -Supine gilmer shoulder flexion.  -Pendulms  -Standing flexed at waist for rows and extension  -Scapular reraction    Home Exercise Program/Education:  Issued HEP (see patient instructions in EMR) and educated on modality use for pain management . Exercises were reviewed and Patito was able to demonstrate them prior to the end of the session.   Pt received a written copy of exercises to perform at home. Patito demonstrated fair  understanding of the education provided.  Pt was advised to perform these exercises free of pain, and to stop performing them if pain occurs.    Patient/Family Education: role of OT, goals for OT, scheduling/cancellations - pt verbalized understanding. Discussed insurance limitations with patient.    Additional Education provided: NA    Assessment     Patito Domingo is a 69 y.o. female referred to outpatient occupational therapy and presents with a medical diagnosis of R shoulder impingement , resulting in Decreased ROM, Decreased muscle strength, Increased pain and Decreased  functional use of arm without pain and demonstrates limitations as described in the chart below. Following medical record review it is determined that pt will benefit from occupational therapy services in order to maximize pain free and/or functional use of bilateral Shoulders. Pt noted to have pain and weakness in MAURISIO matthew. The following goals were discussed with the patient and patient is in agreement with them as to be addressed in the treatment plan. The patient's rehab potential is Good.     Anticipated barriers to occupational therapy: NA  Pt has no cultural, educational or language barriers to learning provided.    Profile and History Assessment of Occupational Performance Level of Clinical Decision Making Complexity Score   Occupational Profile:   Patito Domingo is a 69 y.o. female who lives with their family and is retired Patito Domingo has difficulty with  ADLs and IADLs as listed previously, which  affecting his/her daily functional abilities.      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    Medical and Therapy History Review:   Expanded               Performance Deficits    Physical:  Joint Mobility  Muscle Power/Strength  Pain    Cognitive:  No Deficits    Psychosocial:    No Deficits     Clinical Decision Making:  moderate    Assessment Process:  Problem-Focused Assessments    Modification/Need for Assistance:  Not Necessary    Intervention Selection:  Several Treatment Options       moderate  Based on PMHX, co morbidities , data from assessments and functional level of assistance required with task and clinical presentation directly impacting function.       The following goals were discussed with the patient and patient is in agreement with them as to be addressed in the treatment plan.     Goals:    STG 4 weeks  1. Pt to increase B shoulder AROM to 130 deg   2. Pt to report pain decreased to 6/10  3. In crease ER MMT R to 4/5  4. Pt able to sleep through the not waking up more than 1x  LTG  1. Pt's pain decreased to 3/10  2. Pt's AROM B shoulder increased to 140 for performance or ADL's and overhead reaching  3. Pt I with HEP  4,  Pt able to sleep through the night without waking up        Plan   Certification Period/Plan of care expiration: 1/7/2020 to 2/28/2020.    Outpatient Occupational Therapy 2 times weekly for 8 weeks to include the following interventions: Manual therapy/joint mobilizations, Modalities for pain management, Therapeutic exercises/activities. and Strengthening.      ANYA Lovett

## 2020-01-07 NOTE — PATIENT INSTRUCTIONS
Shoulder and Upper Back Stretch  To start, stand tall with your ears, shoulders, and hips in line. Your feet should be slightly apart, positioned just under your hips. Focus your eyes directly in front of you.  this position for a few seconds before starting your exercise. This helps increase your awareness of proper posture.          Reach overhead and slightly back with both arms. Keep your shoulders and neck aligned and your elbows behind your shoulders:  · With your palms facing the ceiling, turn your fingers inward.  · Take a deep breath. Breathe out, and lower your elbows toward your buttocks. Hold for 5 seconds, then return to starting position.  · Repeat 3 times.  Date Last Reviewed: 8/16/2015  © 4963-3044 [employer]. [address] [place] [address]. All rights reserved. This information is not intended as a substitute for professional medical care. Always follow your healthcare professional's instructions.        Shoulder Squeeze Exercise    To start, sit in a chair with your feet flat on the floor. Shift your weight slightly forward to avoid rounding your back. Relax. Keep your ears, shoulders, and hips aligned:  · Raise your arms to shoulder height, elbows bent and palms forward.  · Move your arms back, squeezing your shoulder blades together.  · Hold for 10 seconds. Return to starting position.   · Repeat 5 times.   For your safety, check with your healthcare provider before starting an exercise program.   Date Last Reviewed: 8/16/2015  © 1528-7274 [employer]. [address] [place] [address]. All rights reserved. This information is not intended as a substitute for professional medical care. Always follow your healthcare professional's instructions.      ROM: Flexion - Wand (Supine)        Lie on back holding wand. Raise arms over head.   Repeat ____ times per set. Do ____ sets per session. Do ____ sessions per day.     https://orth.exer.us/929      Copyright © I. All rights reserved.

## 2020-01-08 ENCOUNTER — TELEPHONE (OUTPATIENT)
Dept: FAMILY MEDICINE | Facility: CLINIC | Age: 70
End: 2020-01-08

## 2020-01-08 DIAGNOSIS — R26.81 GAIT INSTABILITY: Primary | ICD-10-CM

## 2020-01-08 NOTE — TELEPHONE ENCOUNTER
----- Message from ANYA Lovett sent at 1/8/2020 12:10 PM CST -----  Dr Rosen,  I saw Ms wolf for her shoulder. I thing that she would also benefit from PT for her gait and balance. If you agree would you please put in a PT referral for this.  Let me know if you need any more information from me.  Thank you,  joni

## 2020-01-09 ENCOUNTER — OUTPATIENT CASE MANAGEMENT (OUTPATIENT)
Dept: ADMINISTRATIVE | Facility: OTHER | Age: 70
End: 2020-01-09

## 2020-01-09 PROBLEM — M25.512 CHRONIC PAIN OF BOTH SHOULDERS: Status: ACTIVE | Noted: 2020-01-09

## 2020-01-09 PROBLEM — M25.619 DECREASED RANGE OF MOTION OF SHOULDER: Status: ACTIVE | Noted: 2020-01-09

## 2020-01-09 PROBLEM — R29.898 WEAKNESS OF SHOULDER: Status: ACTIVE | Noted: 2020-01-09

## 2020-01-09 PROBLEM — M25.511 CHRONIC PAIN OF BOTH SHOULDERS: Status: ACTIVE | Noted: 2020-01-09

## 2020-01-09 PROBLEM — G89.29 CHRONIC PAIN OF BOTH SHOULDERS: Status: ACTIVE | Noted: 2020-01-09

## 2020-01-09 NOTE — PROGRESS NOTES
Outpatient Care Management   - Care Plan Follow Up    Patient: Patito Domingo  MRN:  [id number]  Date of Service:  1/9/2020  Completed by:  Alyson Montalvo LMSW  Referral Date: 11/13/2019  Program: Case Management (High Risk)    Reason for Visit   Patient presents with    Case Closure     SUMMARY:   LMSW completed follow up with patient. She stated not feeling very well and having stomach issues all day. Discussed housing in length with pt during call. She stated having not check on the [employer] application and that when she called last time about the low-income apartment she was 28th on the list and hadn't moved. She stated she applied for another apartment not low income. She stated she needed to pay a deposit and 1st month rent. I advised pt to consider what she can afford. She report the apartment being around $800 but her income being $950. She reports feeling like she can handle it, she just needs to leave her sons place. I encouraged pt to wait it out that with other bills she may have difficulties paying for food, medications, and other financial obligations. She stated that she would only sign for a few months and move if something cheaper came about. Pt stated she was thankful for my assistance. No other concerns were voiced. LMSW notified pt this would be the last follow up call. Patient goals have been partially met. Let patient know to call if any needs arised in the future. Encourgaged patient to continue to review resources/materials. LMSW closing case. RN notified.     Complex Care Plan     Care plan was discussed and completed today with input from patient and/or caregiver.    Goals      Patient/caregiver will accept life style changes to manage and improve coping due to Metastatic Carcinoid tumor prior to discharge from [facility]. - Priority: High      Overall Time to Completion  2 months from 11/20/2019     [facility] Identified Patient Barriers:  Medication Adherence:  Yes  Health Literacy:   yes  Equipment/Supplies/Services:  yes (Comment: Pt reports that she would like to receive inpt physical therapy due to weakness. )  PHQ:  Yes  Fall Risk:  Pos         Financial: Yes       [facility] Identified Disease Education Barriers:  Hypertension:  Pos  Anemia:  Neg          Short Term Goals  Patient/caregiver will verbalize 2 risk factors of Ineffective coping within 1 month.  Interventions   · Assess patient's ability to perform ADLs.  · Assess type of communication barriers.  · Collaborate with Physician as appropriate to meet patient needs.  · Provide contact information for 24 hour Crisis Line number- COPE LINE.  · Recognize and provide educational material (KRAMES).  · Refer to Outpatient Case Management Social Worker.     Status  · Met12/4/19, 12/12/19      Patient/caregiver will verbalize 2 strategies to effectivecoping strategies within 1 month.  Interventions   · Assess type of communication barriers.  · Collaborate with Physician as appropriate to meet patient needs.  · Facilitate referral for counseling.  · Provide contact information for 24 hour Crisis Line number- COPE LINE.  · Recognize and provide educational material (KRAMES).  · Refer to Outpatient Case Management Social Worker.     Status  · Partially met           Clinical Reference Documents Added to Patient Instructions       Document    CANCER, RESOURCES FOR PEOPLE WITH (ENGLISH)    FALLS IN THE HOME, PREVENTING (ENGLISH)    FALLS, PREVENTING, MAKE YOUR HEALTH A PRIORITY (ENGLISH)    ONCOLOGY: COMMUNICATING WITH OTHERS  (ENGLISH)    WEAKNESS (UNCERTAIN CAUSE) (ENGLISH)             Patient/caregiver will accept life style changes to manage and improve risk of Falls prior to discharge from [facility]. - Priority: High      Overall Time to Completion  2 months from 11/20/2019     [facility] Identified Patient Barriers:    Medication Adherence:  Yes    Equipment/Supplies/Services: Included in care plan (Comment: Pt reports that she would like to receive inpt  physical therapy due to weakness. )    PHQ:  Care plan created  Fall Risk: Care plan created         Financial: Yes       [facility] Identified Disease Education Barriers:  Hypertension:  Pos  Anemia:  Neg          Short Term Goals  Patient/caregiver will verbalize 2 risk factors of Falls within 1 month.  Interventions   · Assess patient's ability to perform ADLs.  · Collaborate with Physician as appropriate to meet patient needs.  · Discuss alternate Levels of Care with patient/caregiver.  · Encourage Exercise.  · Facilitate Home Health Services.  · Facilitate referral to Outpatient Rehab.  · Recognize and provide educational material (REMEDIOS).  · Refer to Outpatient Case Management Social Worker.     Status  · Partially met      Patient/caregiver will verbalize 2 strategies to rest breaks and manage fatigue within 1 month.  Interventions   · Assess patient's ability to perform ADLs.  · Collaborate with Physician as appropriate to meet patient needs.  · Recognize and provide educational material (REMEDIOS).     Status  · Partially met           Clinical Reference Documents Added to Patient Instructions       Document    CANCER, RESOURCES FOR PEOPLE WITH (ENGLISH)    FALLS IN THE HOME, PREVENTING (ENGLISH)    FALLS, PREVENTING, MAKE YOUR HEALTH A PRIORITY (ENGLISH)    ONCOLOGY: COMMUNICATING WITH OTHERS  (ENGLISH)    WEAKNESS (UNCERTAIN CAUSE) (ENGLISH)                  Patient Instructions     Instructions were provided via the [facility] patient resources and are available for the patient to view on the patient portal.    No follow-ups on file.    Todays [facility] Self-Management Care Plan was developed with the patients/caregivers input and was based on identified barriers from todays assessment.  Goals were written today with the patient/caregiver and the patient has agreed to work towards these goals to improve his/her overall well-being. Patient verbalized understanding of the care plan, goals, and all of today's  instructions. Encouraged patient/caregiver to communicate with his/her physician and health care team about health conditions and the treatment plan.  Provided my contact information today and encouraged patient/caregiver to call me with any questions as needed.

## 2020-01-10 ENCOUNTER — CLINICAL SUPPORT (OUTPATIENT)
Dept: REHABILITATION | Facility: HOSPITAL | Age: 70
End: 2020-01-10
Attending: INTERNAL MEDICINE
Payer: MEDICARE

## 2020-01-10 DIAGNOSIS — G89.29 CHRONIC PAIN OF BOTH SHOULDERS: ICD-10-CM

## 2020-01-10 DIAGNOSIS — R29.898 WEAKNESS OF SHOULDER: ICD-10-CM

## 2020-01-10 DIAGNOSIS — M25.511 CHRONIC PAIN OF BOTH SHOULDERS: ICD-10-CM

## 2020-01-10 DIAGNOSIS — M25.512 CHRONIC PAIN OF BOTH SHOULDERS: ICD-10-CM

## 2020-01-10 DIAGNOSIS — M25.619 DECREASED RANGE OF MOTION OF SHOULDER, UNSPECIFIED LATERALITY: ICD-10-CM

## 2020-01-10 DIAGNOSIS — R53.1 DECREASED STRENGTH: ICD-10-CM

## 2020-01-10 DIAGNOSIS — Z74.09 IMPAIRED FUNCTIONAL MOBILITY, BALANCE, GAIT, AND ENDURANCE: ICD-10-CM

## 2020-01-10 PROCEDURE — 97110 THERAPEUTIC EXERCISES: CPT | Mod: HCNC,PN

## 2020-01-10 PROCEDURE — 97162 PT EVAL MOD COMPLEX 30 MIN: CPT | Mod: HCNC,PN | Performed by: PHYSICAL THERAPIST

## 2020-01-13 NOTE — PROGRESS NOTES
Occupational Therapy Daily Treatment Note     Date: 1/10/2020  [facility] [id number]    Therapy Diagnosis:   Encounter Diagnoses   Name Primary?    Chronic pain of both shoulders     Decreased range of motion of shoulder, unspecified laterality     Weakness of shoulder      Physician: Pantera Rosen MD     Physician Orders: Eval and Treat Impingement R shoulder  Medical Diagnosis: R shoulder impingement syndrome  Surgical Procedure and Date: NA,   Evaluation Date: 1/7/2020  Insurance Authorization Period Expiration: 1 Exp 12/31/2020  Plan of Care Certification Period: 2/28/2020  Date of Return to MD: 3/2/2020    Visit # / Visits authorized: 1 / 5 Exp 12/31/2020  Time In:1150 am  Time Out: 1145 am  Total Billable Time: 555 minutes    Precautions:  Standard      Subjective     Pt reports: I am doing ok today, my ride was just  Running late.  she was compliant with home exercise program given last session.   Response to previous treatment:Increased ROM  Functional change: States pain has decreased a little.    Pain: 7/10  Location: right shoulder      Objective     Patito received therapeutic exercises for 55 minutes including:  -UBE 3 min Forward and 3 min Reverse  -Supine on mat for R UE PROM for ER IR flex and ABD  -Supine for dowel ex chest [id number] and Flexion to 90 2x10  -Supine for throwing textured red rubber ball; B lifting ball over head and throwing 20x  -Supine for B ER with yellow band 2x10  -Supine horizontal abduction yellow band 20x10  -Theraband green for rows and extension 2x10 each  - multigym for lats 7# 2x10    AROM scaption L 130 R 125    Home Exercises and Education Provided     Education provided:   - Cont with BENITO for ROM EX  - Progress towards goals     Written Home Exercises Provided: Patient instructed to cont prior HEP.  Exercises were reviewed and Patito was able to demonstrate them prior to the end of the session.  Patito demonstrated good   understanding of the HEP provided.   .   See EMR under Patient Instructions for exercises provided 1/10/2020.        Assessment     Pt would continue to benefit from skilled OT. Pt has made progress with her AROM and has stated that pain has decreased a little. Pt is motivated to continue.     Patito is progressing well towards her goals and there are no updates to goals at this time. Pt prognosis is Excellent.     Pt will continue to benefit from skilled outpatient occupational therapy to address the deficits listed in the problem list on initial evaluation provide pt/family education and to maximize pt's level of independence in the home and community environment.     Anticipated barriers to occupational therapy: NA    Pt's spiritual, cultural and educational needs considered and pt agreeable to plan of care and goals.    Goals:   STG 4 weeks  1. Pt to increase B shoulder AROM to 130 deg   2. Pt to report pain decreased to 6/10  3. In crease ER MMT R to 4/5  4. Pt able to sleep through the not waking up more than 1x  LTG  1. Pt's pain decreased to 3/10  2. Pt's AROM B shoulder increased to 140 for performance or ADL's and overhead reaching  3. Pt I with HEP  4,  Pt able to sleep through the night without waking up    Plan   Pt to continue with OT 2x a week for the plan of care ending 2/28/2020  Updates/Grading for next session: progress with strengthening and functional tasks as tolerated.      ANYA Lovett

## 2020-01-13 NOTE — PATIENT INSTRUCTIONS
Cane Exercise: Flexion        Lie on back, holding cane above chest. Keeping arms as straight as possible, lower cane toward floor beyond head. Hold ____ seconds.  Repeat ____ times. Do ____ sessions per day.     [email or web address]     Copyright © [employer]. All rights reserved.   (Home) External Rotation: With Abduction - Sitting        Opposite side toward anchor, right arm supported, elbow out, bent to 90°, forearm across body. Rotate shoulder by pulling hand away from body. Keep elbow bent to 90°.  Repeat ____ times per set. Do ____ sets per session. Do ____ sessions per week.  Use ____ lb weights.    Copyright © [employer]. All rights reserved.   ROM: Pendulum (Circular)        Let right arm move in [place] clockwise, then counterclockwise, by rocking body weight in circular pattern.  [place] ____ times each direction per set. Do ____ sets per session. Do ____ sessions per day.     [email or web address]/794     Copyright © [employer]. All rights reserved.   ROM: External Rotation - Wand (Supine)        Lie on back holding wand with elbows bent to 90°. Rotate forearms over head as far as possible.   Repeat ____ times per set. Do ____ sets per session. Do ____ sessions per day.     [email or web address].[email or web address]     Copyright © [employer]. All rights reserved.   ROM: Saw (Protraction / Retraction)        Reach right arm out in front, then pull arm back, pinching shoulder blades together.  Repeat ____ times per set. Do ____ sets per session. Do ____ sessions per day.     [email or web address]/796     Copyright © [employer]. All rights reserved.   Resisted External Rotation: in Neutral - Bilateral        Sit or stand, tubing in both hands, elbows at sides, bent to 90°, forearms forward. Pinch shoulder blades together and rotate forearms out. Keep elbows at sides.  Repeat ____ times per set. Do ____ sets per session. Do ____ sessions per day.     [email or web address]     Copyright © [employer]. All rights reserved.   Scapular Retraction: Elbow Flexion  (Standing)        With elbows bent to 90°, pinch shoulder blades together and rotate arms out, keeping elbows bent.  Repeat ____ times per set. Do ____ sets per session. Do ____ sessions per day.     [email or web address]     Copyright © V[employer]. All rights reserved.

## 2020-01-14 ENCOUNTER — HOSPITAL ENCOUNTER (OUTPATIENT)
Facility: HOSPITAL | Age: 70
Discharge: HOME | End: 2020-01-15
Attending: EMERGENCY MEDICINE | Admitting: FAMILY MEDICINE
Payer: MEDICARE

## 2020-01-14 DIAGNOSIS — R10.13 ABDOMINAL PAIN, EPIGASTRIC: ICD-10-CM

## 2020-01-14 DIAGNOSIS — R19.7 NAUSEA VOMITING AND DIARRHEA: ICD-10-CM

## 2020-01-14 DIAGNOSIS — R11.2 NAUSEA VOMITING AND DIARRHEA: ICD-10-CM

## 2020-01-14 DIAGNOSIS — E87.6 HYPOKALEMIA: Primary | ICD-10-CM

## 2020-01-14 DIAGNOSIS — R53.1 WEAKNESS GENERALIZED: ICD-10-CM

## 2020-01-14 PROBLEM — R10.84 GENERALIZED ABDOMINAL PAIN: Status: ACTIVE | Noted: 2020-01-14

## 2020-01-14 PROBLEM — Z74.09 IMPAIRED FUNCTIONAL MOBILITY, BALANCE, GAIT, AND ENDURANCE: Status: ACTIVE | Noted: 2020-01-14

## 2020-01-14 LAB
ALBUMIN SERPL BCP-MCNC: 3.5 G/DL (ref 3.5–5.2)
ALP SERPL-CCNC: 100 U/L (ref 55–135)
ALT SERPL W/O P-5'-P-CCNC: 6 U/L (ref 10–44)
ANION GAP SERPL CALC-SCNC: 13 MMOL/L (ref 8–16)
AST SERPL-CCNC: 13 U/L (ref 10–40)
BILIRUB SERPL-MCNC: 0.5 MG/DL (ref 0.1–1)
BILIRUB UR QL STRIP: NEGATIVE
BUN SERPL-MCNC: 10 MG/DL (ref 8–23)
CALCIUM SERPL-MCNC: 9.6 MG/DL (ref 8.7–10.5)
CHLORIDE SERPL-SCNC: 100 MMOL/L (ref 95–110)
CLARITY UR: CLEAR
CO2 SERPL-SCNC: 27 MMOL/L (ref 23–29)
COLOR UR: YELLOW
CREAT SERPL-MCNC: 1.2 MG/DL (ref 0.5–1.4)
ERYTHROCYTE [DISTWIDTH] IN BLOOD BY AUTOMATED COUNT: 14.2 % (ref 11.5–14.5)
EST. GFR  (AFRICAN AMERICAN): 54 ML/MIN/1.73 M^2
EST. GFR  (NON AFRICAN AMERICAN): 47 ML/MIN/1.73 M^2
GLUCOSE SERPL-MCNC: 97 MG/DL (ref 70–110)
GLUCOSE UR QL STRIP: NEGATIVE
HCT VFR BLD AUTO: 34.6 % (ref 37–48.5)
HGB BLD-MCNC: 10.7 G/DL (ref 12–16)
HGB UR QL STRIP: ABNORMAL
KETONES UR QL STRIP: ABNORMAL
LACTATE SERPL-SCNC: 1.2 MMOL/L (ref 0.5–2.2)
LEUKOCYTE ESTERASE UR QL STRIP: NEGATIVE
LIPASE SERPL-CCNC: 7 U/L (ref 4–60)
MCH RBC QN AUTO: 27.4 PG (ref 27–31)
MCHC RBC AUTO-ENTMCNC: 30.9 G/DL (ref 32–36)
MCV RBC AUTO: 89 FL (ref 82–98)
NITRITE UR QL STRIP: NEGATIVE
PH UR STRIP: 6 [PH] (ref 5–8)
PLATELET # BLD AUTO: 241 K/UL (ref 150–350)
PMV BLD AUTO: 9.8 FL (ref 9.2–12.9)
POTASSIUM SERPL-SCNC: 3.2 MMOL/L (ref 3.5–5.1)
PROT SERPL-MCNC: 7.4 G/DL (ref 6–8.4)
PROT UR QL STRIP: NEGATIVE
RBC # BLD AUTO: 3.9 M/UL (ref 4–5.4)
SODIUM SERPL-SCNC: 140 MMOL/L (ref 136–145)
SP GR UR STRIP: 1.02 (ref 1–1.03)
URN SPEC COLLECT METH UR: ABNORMAL
UROBILINOGEN UR STRIP-ACNC: NEGATIVE EU/DL
WBC # BLD AUTO: 5.96 K/UL (ref 3.9–12.7)

## 2020-01-14 PROCEDURE — 25000003 PHARM REV CODE 250: Mod: HCNC | Performed by: FAMILY MEDICINE

## 2020-01-14 PROCEDURE — 83605 ASSAY OF LACTIC ACID: CPT | Mod: HCNC

## 2020-01-14 PROCEDURE — 83690 ASSAY OF LIPASE: CPT | Mod: HCNC

## 2020-01-14 PROCEDURE — 96376 TX/PRO/DX INJ SAME DRUG ADON: CPT | Mod: HCNC

## 2020-01-14 PROCEDURE — 63600175 PHARM REV CODE 636 W HCPCS: Mod: HCNC | Performed by: NURSE PRACTITIONER

## 2020-01-14 PROCEDURE — 80053 COMPREHEN METABOLIC PANEL: CPT | Mod: HCNC

## 2020-01-14 PROCEDURE — 63600175 PHARM REV CODE 636 W HCPCS: Mod: HCNC | Performed by: FAMILY MEDICINE

## 2020-01-14 PROCEDURE — 63600175 PHARM REV CODE 636 W HCPCS: Mod: HCNC | Performed by: EMERGENCY MEDICINE

## 2020-01-14 PROCEDURE — 96376 TX/PRO/DX INJ SAME DRUG ADON: CPT

## 2020-01-14 PROCEDURE — G0378 HOSPITAL OBSERVATION PER HR: HCPCS | Mod: HCNC

## 2020-01-14 PROCEDURE — 25000003 PHARM REV CODE 250: Mod: HCNC | Performed by: EMERGENCY MEDICINE

## 2020-01-14 PROCEDURE — 81003 URINALYSIS AUTO W/O SCOPE: CPT | Mod: HCNC

## 2020-01-14 PROCEDURE — 85027 COMPLETE CBC AUTOMATED: CPT | Mod: HCNC

## 2020-01-14 PROCEDURE — 96361 HYDRATE IV INFUSION ADD-ON: CPT | Mod: HCNC

## 2020-01-14 PROCEDURE — 99285 EMERGENCY DEPT VISIT HI MDM: CPT | Mod: 25,HCNC

## 2020-01-14 PROCEDURE — 96365 THER/PROPH/DIAG IV INF INIT: CPT | Mod: HCNC

## 2020-01-14 PROCEDURE — 96375 TX/PRO/DX INJ NEW DRUG ADDON: CPT | Mod: HCNC

## 2020-01-14 PROCEDURE — 96372 THER/PROPH/DIAG INJ SC/IM: CPT | Mod: 59

## 2020-01-14 PROCEDURE — 96372 THER/PROPH/DIAG INJ SC/IM: CPT | Mod: HCNC

## 2020-01-14 RX ORDER — ONDANSETRON 8 MG/1
8 TABLET, ORALLY DISINTEGRATING ORAL EVERY 8 HOURS PRN
Status: DISCONTINUED | OUTPATIENT
Start: 2020-01-14 | End: 2020-01-15 | Stop reason: HOSPADM

## 2020-01-14 RX ORDER — SODIUM CHLORIDE 9 MG/ML
500 INJECTION, SOLUTION INTRAVENOUS
Status: COMPLETED | OUTPATIENT
Start: 2020-01-14 | End: 2020-01-14

## 2020-01-14 RX ORDER — METOPROLOL TARTRATE 50 MG/1
50 TABLET ORAL 2 TIMES DAILY
Status: DISCONTINUED | OUTPATIENT
Start: 2020-01-14 | End: 2020-01-15 | Stop reason: HOSPADM

## 2020-01-14 RX ORDER — POTASSIUM CHLORIDE 20 MEQ/15ML
40 SOLUTION ORAL ONCE
Status: COMPLETED | OUTPATIENT
Start: 2020-01-14 | End: 2020-01-14

## 2020-01-14 RX ORDER — LABETALOL HYDROCHLORIDE 5 MG/ML
10 INJECTION, SOLUTION INTRAVENOUS
Status: COMPLETED | OUTPATIENT
Start: 2020-01-14 | End: 2020-01-14

## 2020-01-14 RX ORDER — ENOXAPARIN SODIUM 100 MG/ML
40 INJECTION SUBCUTANEOUS EVERY 24 HOURS
Status: DISCONTINUED | OUTPATIENT
Start: 2020-01-14 | End: 2020-01-15 | Stop reason: HOSPADM

## 2020-01-14 RX ORDER — MORPHINE SULFATE 4 MG/ML
2 INJECTION, SOLUTION INTRAMUSCULAR; INTRAVENOUS EVERY 6 HOURS PRN
Status: DISCONTINUED | OUTPATIENT
Start: 2020-01-14 | End: 2020-01-15

## 2020-01-14 RX ORDER — TAMSULOSIN HYDROCHLORIDE 0.4 MG/1
0.4 CAPSULE ORAL NIGHTLY
Status: DISCONTINUED | OUTPATIENT
Start: 2020-01-14 | End: 2020-01-15 | Stop reason: HOSPADM

## 2020-01-14 RX ORDER — MORPHINE SULFATE 2 MG/ML
2 INJECTION, SOLUTION INTRAMUSCULAR; INTRAVENOUS
Status: COMPLETED | OUTPATIENT
Start: 2020-01-14 | End: 2020-01-14

## 2020-01-14 RX ORDER — SODIUM CHLORIDE 0.9 % (FLUSH) 0.9 %
10 SYRINGE (ML) INJECTION
Status: DISCONTINUED | OUTPATIENT
Start: 2020-01-14 | End: 2020-01-15 | Stop reason: HOSPADM

## 2020-01-14 RX ORDER — LOSARTAN POTASSIUM 50 MG/1
50 TABLET ORAL DAILY
Status: DISCONTINUED | OUTPATIENT
Start: 2020-01-15 | End: 2020-01-15 | Stop reason: HOSPADM

## 2020-01-14 RX ORDER — POTASSIUM CHLORIDE 20 MEQ/1
40 TABLET, EXTENDED RELEASE ORAL
Status: COMPLETED | OUTPATIENT
Start: 2020-01-14 | End: 2020-01-14

## 2020-01-14 RX ORDER — HYDRALAZINE HYDROCHLORIDE 20 MG/ML
10 INJECTION INTRAMUSCULAR; INTRAVENOUS EVERY 8 HOURS PRN
Status: DISCONTINUED | OUTPATIENT
Start: 2020-01-14 | End: 2020-01-15 | Stop reason: HOSPADM

## 2020-01-14 RX ADMIN — MORPHINE SULFATE 2 MG: 2 INJECTION, SOLUTION INTRAMUSCULAR; INTRAVENOUS at 10:01

## 2020-01-14 RX ADMIN — PROMETHAZINE HYDROCHLORIDE 12.5 MG: 25 INJECTION INTRAMUSCULAR; INTRAVENOUS at 10:01

## 2020-01-14 RX ADMIN — METOPROLOL TARTRATE 50 MG: 50 TABLET ORAL at 09:01

## 2020-01-14 RX ADMIN — MORPHINE SULFATE 2 MG: 4 INJECTION, SOLUTION INTRAMUSCULAR; INTRAVENOUS at 11:01

## 2020-01-14 RX ADMIN — ENOXAPARIN SODIUM 40 MG: 100 INJECTION SUBCUTANEOUS at 05:01

## 2020-01-14 RX ADMIN — MORPHINE SULFATE 2 MG: 2 INJECTION, SOLUTION INTRAMUSCULAR; INTRAVENOUS at 02:01

## 2020-01-14 RX ADMIN — SODIUM CHLORIDE 500 ML: 0.9 INJECTION, SOLUTION INTRAVENOUS at 10:01

## 2020-01-14 RX ADMIN — ONDANSETRON 8 MG: 8 TABLET, ORALLY DISINTEGRATING ORAL at 07:01

## 2020-01-14 RX ADMIN — POTASSIUM CHLORIDE 40 MEQ: 1500 TABLET, EXTENDED RELEASE ORAL at 01:01

## 2020-01-14 RX ADMIN — TAMSULOSIN HYDROCHLORIDE 0.4 MG: 0.4 CAPSULE ORAL at 09:01

## 2020-01-14 RX ADMIN — LABETALOL HYDROCHLORIDE 10 MG: 5 INJECTION INTRAVENOUS at 12:01

## 2020-01-14 RX ADMIN — POTASSIUM CHLORIDE 40 MEQ: 20 SOLUTION ORAL at 12:01

## 2020-01-14 NOTE — PLAN OF CARE
"[facility]  Physical Therapy Neurological Rehabilitation Initial Evaluation    Name: Patito Domingo  Clinic Number: [id number]    Therapy Diagnosis:   1. Impaired functional mobility, balance, gait, and endurance     2. Decreased strength        Physician: Pantera Rosen MD    Physician Orders: PT Eval and Treat gait/ balance problems  Medical Diagnosis from Referral: R26.81 (ICD-10-CM) - Gait instability  Evaluation Date: 1/10/2020  Authorization Period Expiration: 1/7/2021  Plan of Care Expiration: 3/6/2020  Visit # / Visits authorized: 1/ 1    Time In: 1251  Time Out: 1345  Total Billable Time: 54 minutes    Precautions: Standard and Fall    Subjective   Date of onset: worsening balance and mobility over the past year  History of current condition - Patito reports: a fall in ~2017 in which she hit her head and had to receive inpatient rehab x 1 month due to mobility and gait impairments. Pt denies any neurosurgery. Pt reports that she improved in mobility, then "weakness came back". Pt reports Rolator use x 2 years. Pt reports fear of falling. Last fall ~1 year ago. occasional furniture walking at home. Pt is currently receiving OT to address ISHA shoulder impingement. Pt was receiving chemo ~1 yr ago due to tumors of colon and liver. Pt anticipates that she may have to restart chemo. PMHx: HTN controlled with meds. Pt has kidney stones.      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    Medications:   Patito has a current medication list which includes the following prescription(s): bumetanide, clonidine, cyanocobalamin, diclofenac sodium, diphenoxylate-atropine 2.5-0.025 mg, ergocalciferol, ferrous sulfate, hydrocortisone valerate, losartan, magnesium oxide, methylprednisolone, metoprolol tartrate, ondansetron, oxycodone, promethazine, tamsulosin, and trolamine salicylate.    Allergies:   Review of patient's allergies indicates:   Allergen Reactions    Epinephrine Anaphylaxis     Can cause  a Carcinoid Crisis    Contrast media Hives, Itching and Swelling    Ibuprofen Hives, Itching and Swelling    Iodinated contrast media     Sulfa (sulfonamide antibiotics) Hives, Itching and Swelling        Imaging, none: pertaining to imbalance/ gait disorder    Prior Therapy: last PT ~1 year ago for imbalance/ weakness  Social History:  lives with their son  Falls: last fall ~1 year ago   DME: Small based Quad cane, Rollator, BSC and Tub bench   Home Environment: lives 2 story home, bedroom upstairs, handrail on R for stairs.    Exercise Routine / History: stopped exercising due to LE weakness   Family Present at time of Eval: none   Occupation: NA  Prior Level of Function: ambulation with Rolator, able to perform household cleaning duties as long as she "takes her time", no significant fear of falling reported  Current Level of Function: pt is moving slower, pt is slower with cleaning tasks (difficulty with sweeping- requires UE support), fear of falling due to declining balance    Pain:  Current 8/10, worst 9/10, best 8/10   Location: NA    Description: feels funny like stomach is growling  Aggravating Factors: Bending and Walking  Easing Factors: pain medication    Pts goals: to walk without my walker, be able to stand longer    Objective   177/70, HR= 52 bpm    - Follows commands: yes   - Speech: no deficits     Mental status: alert, " "oriented to person, place, and time. Pt with slight confusion with time/ date, but able to self correct  Appearance: Casually dressed  Behavior:  calm and cooperative  Attention Span and Concentration:  Normal    Dominant hand:  left     Posture Alignment :forward head, rounded shoulders    Skin integrity:  Intact    Sensation:  Light Touch: Intact           Proprioception:   Intact    Tone: 0 - No increase in muscle tone  Limbs/muscles affected: pt reports "cramps" in ISHA calves and hands    Visual/Auditory: denies changes, wears glasses. Pt reports she is due for a new prescription    Coordination:   - fine motor: NT  - UE coordination: NT    - LE coordination:  alternating toe taps= good speed and control    ROM:   UPPER EXTREMITY--AROM/PROM  Refer to OT report for details          RANGE OF MOTION--LOWER EXTREMITIES  (R) LE Hip: equal bilaterally   Knee: equal bilaterally   Ankle: equal bilaterally    (L) LE: Hip: equal bilaterally   Knee: equal bilaterally   Ankle: equal bilaterally    Strength: manual muscle test grades below   Upper Extremity Strength  Refer to OT report for details     Lower Extremity Strength   RLE LLE   Hip Flexion: 3+/5 3+/5   Hip Extension:  3+/5 3+/5   Hip Abduction: 3+/5 3+/5   Hip Adduction: 2+/5 2+/5   Knee Extension: 3+/5 3+/5   Knee Flexion: 3+/5 3+/5   Ankle Dorsiflexion: 3+/5 3+/5   Ankle Plantarflexion: 3+/5 3+/5   Ankle Inversion: NT NT   Ankle Eversion: NT NT        Evaluation   Single Limb Stance R LE 1 sec  (<10 sec = HIGH FALL RISK)   Single Limb Stance L LE 1 sec  (<10 sec = HIGH FALL RISK)   Gonzalez 39/56   5 times sit-stand 20 seconds from chair with armrests     Postural control:  MCTSIB:  1. Eyes Open/feet together/Firm: 30 seconds, min-mod sway  2. Eyes Closed/feet together/Firm: 10 seconds, mod sway  3. Eyes Open/feet together/Foam: 7 seconds, mod sway  4. Eyes Closed/feet together/Foam: NT/ unable    Gait Assessment:   - AD used: rolator  - Assistance: mod I  - " Distance: limited community  - Curb: Mod I- per pt with rolator  - Ramp:  Mod I- per pt with rolator  - Stairs: Mod I- per pt, step to pattern with handrail      GAIT DEVIATIONS:  Slower speed, decreased foot clearance    Impairments contributing to deviations: impaired motor control, decreased strength, impaired balance    Endurance Deficit: fair for eval      Evaluation   Timed Up and Go 21.5 sec with rolator   Self Selected Walking Speed NT   Fast Walking Speed NT     BARROSO Balance Assessment  1. Sitting to Standing   4 - able to stand without using hands and stabilize independently  2. Standing Unsupported   4 - able to stand safely 2 minutes without hold  3. Sitting Unsupported   4 - able to sit safely and securely 2 minutes  4. Standing to Sitting   3 - controls descent by using hands  5. Pivot Transfer   3 - able to transfer safely with definite use of hands  6. Standing with Eyes Closed   4 - albe to stand 10 seconds safely  7. Standing with Feet Together   4 - able to place feet together independently and stand 1 minute safely  8. Reaching Forward with Outstretched Arm   2 - can reach forward 5 cm/2 inches safely  9. Retrieving Object from Floor   3 - able to pick slipper but needs supervision  10. Turning to Look Behind   2 - turns sideways only but maintains balance  11. Turning 360 Degrees   2 - able to turn 360 safely but slowly  12. Placing Alternate Foot on Step   1 - able to complete > 2 steps needs min assist  13. Standing with One Foot in Front   2 - able to take small step indenpendently and hold 30 seconds  14. Standing on One Foot   1 - tries to lift leg and unable to hold 3 seconds but remains standing independently  Total: 39  Maximum: 56  Score:   0-20= high fall risk   21-40 moderate fall risk   41-56 low fall risk     Fall risk cut-off scores:   Elderly and History of falls: <51/56   Elderly and No history of falls: <42/56   CVA: <45/56       Functional Mobility (Bed mobility,  transfers)  Bed mobility: Mod I  Supine to sit: Mod I  Sit to supine: Mod I  Rolling: Mod I  Transfers to bed: Mod I  Transfers to toilet: Mod I to BSC per pt  Sit to stand:  Mod I with UE use  Stand pivot:  Mod I with UE use  Car transfers: NT  Wheelchair mobility: NA  Floor transfers: NT      CMS Impairment/Limitation/Restriction for FOTO Survey    No captured by staff         TREATMENT     No treatment provided today, evaluation only.       Home Exercises and Patient Education Provided    Education provided:   - role of PT/ POC    Written Home Exercises Provided: to be provided at next follow up session.  Exercises were reviewed and Patito was able to demonstrate them prior to the end of the session.  Patito demonstrated good  understanding of the education provided.     See EMR under NA for exercises provided to be provided.    Assessment   Patito is a 69 y.o. female referred to outpatient Physical Therapy with a medical diagnosis of gait impairment. Pt presents with decreased BLE strength, slower gait speed, and impaired balance. Pt is at moderate risk of falls per formal assessments. Pt demonstrates poor balance in narrow Base of support, with eyes closed, and on unstable surfaces.  Pt also demonstrates impaired dynamic standing balance and ability to shift weight on LE. This places pt at risk for falls with mobility and when performing usual household cleaning tasks such as laundry. Pt reports a fear of falling due to imbalance. Pt can benefit from skilled PT to address these impairments to decrease fall risk from moderate to minimal and improve her confidence with all daily mobility and household tasks.     Pt prognosis is Good.   Pt will benefit from skilled outpatient Physical Therapy to address the deficits stated above and in the chart below, provide pt/family education, and to maximize pt's level of independence.     Plan of care discussed with patient: Yes  Pt's spiritual, cultural and educational  needs considered and patient is agreeable to the plan of care and goals as stated below:     Anticipated Barriers for therapy: none    Medical Necessity is demonstrated by the following  History  Co-morbidities and personal factors that may impact the plan of care Co-morbidities:   history of cancer, HTN and colon CA, h/o malignant CA of unknown site, secondary neuroendocrine tumor of liver, h/o kidney stones    Personal Factors:   reports that she would like to receive only 1 therapy session/ visit     high   Examination  Body Structures and Functions, activity limitations and participation restrictions that may impact the plan of care Body Regions:   lower extremities    Body Systems:    gross symmetry  ROM  strength  gross coordinated movement  balance  gait  transfers  transitions  motor control  heart rate  blood pressure    Participation Restrictions:   Requires AD for ambulation  Impaired balance/ fall risk  Decreased speed and increased rest required for household cleaning tasks  Requires skilled PT to issue and progress HEP as needed     Activity limitations:   Learning and applying knowledge  solving problems    General Tasks and Commands  undertaking multiple tasks    Communication  no deficits    Mobility  lifting and carrying objects  walking    Self care  refer to OT report    Domestic Life  cooking  doing house work (cleaning house, washing dishes, laundry)    Interactions/Relationships  no deficits    Life Areas  no deficits    Community and Social Life  community life         high   Clinical Presentation evolving clinical presentation with changing clinical characteristics moderate   Decision Making/ Complexity Score: moderate     Goals:  Short Term Goals: 4 weeks   1. Pt will perform HEP for general strengthening and cardiovascular training with supervision to improve carryover of progress.   2. Pt will demonstrate improved balance for negotiating all surface types by performing romberg on foam  "with eyes open x 30 sec.  3. Pt will demonstrate improved dynamic standing balance for household cleaning tasks by performing a functional reach of 5".   4. Pt will demonstrate a gross improvement in BLE strength by performing 5 sit<>stands with UE support in 15 sec.        Long Term Goals: 8 weeks   1. Pt will demonstrate improved safety in dim environments and improved se of balance strategies by performing romberg on firm surface with eyes closed x 30 sec.   2. Pt will demonstrate improved mobility and decreased fall risk within the home by performing TUG with Rolator in 15 sec or less.   3. Pt will demonstrate decreased fall risk from moderate to low and improve fear of falling by scoring 45/56 on Gonzalez Balance Assessment.   4. Pt will report performance of HEP at least 2 days/ week to progress towards maintenance plan for d/c.       Plan   Plan of care Certification: 1/10/2020 to 3/6/2020.    Outpatient Physical Therapy 2 times weekly for 8 weeks to include the following interventions: Gait Training, Neuromuscular Re-ed, Patient Education, Therapeutic Activites and Therapeutic Exercise.     Alina Pardo, PT      "

## 2020-01-14 NOTE — ED NOTES
Pt transported via stretcher to ED 22.  Connected to cardiac monitor, automatic bp and continuous pulse oximetry.  Report received, care assumed.  Pt awake, alert and oriented.  Skin pink/warm/dry.  Resp =/unlabored.  20 g PIV to left posterior/lateral forearm, site clear.  Pt reports abd discomfort, denies nausea at this time.  Requested/given clear liquid diet tray, eating without difficulty.  Pt updated with current plan of care, verbalizes understanding.  Oriented to call light.  Continuing to monitor.

## 2020-01-14 NOTE — ED NOTES
Pt urinated in pampers. States knows when she needs to urinate but had dropped her call bell. Pt states will notify when she has to urinate again.

## 2020-01-14 NOTE — CONSULTS
Surgery Consult    History of present illness:  Patient is a 70 yo F who presents with diarrhea, nausea, vomiting, abdominal pain, and generalized weakness.  She has a past medical history of small bowel resection, cholecystectomy, kidney stones.  She has a known history of carcinoid syndrome for which she follows in the Neuroendocrine clinic.    She was last seen in the clinic in October.  She has rescheduled her appointment several times since that time.  She has been taking imodium, metamucil, and lomotil at home, she states that her symptoms improved a small amount with these.  She also complains of diffuse abdominal pain.  She states that her vomiting occurs at random times and is not associated with eating.  She states that she has not been eating well because she is scared that it will cause her to have diarrhea.  She has been admitted for diarrhea several times since October.  She has been intermittently treated with immodium and lomotil with little success.    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)      Past Surgical History:   Procedure Laterality Date    CATARACT EXTRACTION Left 10/2017    CHOLECYSTECTOMY      COLON SURGERY      cystoscope      CYSTOSCOPY W/ RETROGRADES Right 10/10/2019    Procedure: CYSTOSCOPY, WITH RETROGRADE PYELOGRAM;  Surgeon: Gen Isbell MD;  Location: [facility] OR;  Service: Urology;  Laterality: Right;    EYE SURGERY      HYSTERECTOMY  5/1996    LITHOTRIPSY      LIVER BIOPSY  9/14    carcinoid    URETEROSCOPY Right 10/10/2019    Procedure: URETEROSCOPY;  Surgeon: Gen Isbell MD;  Location: [facility] OR;  Service: Urology;  Laterality: Right;     Family History   Problem Relation Age of Onset    Cancer Mother         unknown    Alzheimer's disease  Father     Stroke Sister     No Known Problems Son     No Known Problems Son     No Known Problems Son     No Known Problems Son     Kidney disease Neg Hx      Social History     Tobacco Use    Smoking status: Never Smoker    Smokeless tobacco: Never Used   Substance Use Topics    Alcohol use: No     Alcohol/week: 0.0 standard drinks     Frequency: Never     Binge frequency: Never    Drug use: No     Review of patient's allergies indicates:   Allergen Reactions    Epinephrine Anaphylaxis     Can cause  a Carcinoid Crisis    Contrast media Hives, Itching and Swelling    Ibuprofen Hives, Itching and Swelling    Iodinated contrast media     Sulfa (sulfonamide antibiotics) Hives, Itching and Swelling     Review of systems: see HPI; otherwise, 12-point ROS negative.     Vitals:    01/14/20 0934   BP: (!) 206/81   Pulse: 61   Resp: 19   Temp: 97.7 °F (36.5 °C)       Gen: Moderate distress.  Alert and oriented.  HEENT: normocephalic and atraumatic. EOMI. Moist mucous membranes. Trachea midline.   Neck: supple. Normal ROM.  Resp: unlabored respirations. Stable on room air.  CV: regular rate.  Abd: soft, diffusely tender.  Non distended.  No rebound or guarding.    Ext: warm and well perfused. No clubbing, cyanosis, or edema.  Neuro: CN grossly intact. No focal neurological deficits.      Labs:  WBC: 5.96  Hgb: 10.7  Plt: 241  Na: 140  K: 3.2  Cl: 100  CO2: 27  BUN: 10  Cr: 1.2  Lipase: 7  Bili: 0.5  Lactate, venous: 1.2    Imaging  CTs taken at outside hospitals the last 2 days showing nonspecific inflammation of the colon with     Assessment and plan:  70 yo F presenting with diarrhea, weakness and hypertension    -No acute surgical intervention required  -Labs wnl  -Patient with hypertension, asymptomatic  -CT showing nonspecific colitis  -Recommend blood pressure treatment, following up with neuroendocrine clinic    Benjie Ruiz MD  [facility]

## 2020-01-14 NOTE — ASSESSMENT & PLAN NOTE
Metastatic carcinoid tumor  Secondary neuroendocrine tumor of liver  Monitor symptom  Consult [facility]

## 2020-01-14 NOTE — SUBJECTIVE & OBJECTIVE
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)        Past Surgical History:   Procedure Laterality Date    CATARACT EXTRACTION Left 10/2017    CHOLECYSTECTOMY      COLON SURGERY      cystoscope      CYSTOSCOPY W/ RETROGRADES Right 10/10/2019    Procedure: CYSTOSCOPY, WITH RETROGRADE PYELOGRAM;  Surgeon: Gen Isbell MD;  Location: [facility] OR;  Service: Urology;  Laterality: Right;    EYE SURGERY      HYSTERECTOMY  5/1996    LITHOTRIPSY      LIVER BIOPSY  9/14    carcinoid    URETEROSCOPY Right 10/10/2019    Procedure: URETEROSCOPY;  Surgeon: Gen Isbell MD;  Location: [facility] OR;  Service: Urology;  Laterality: Right;       Review of patient's allergies indicates:   Allergen Reactions    Epinephrine Anaphylaxis     Can cause  a Carcinoid Crisis    Contrast media Hives, Itching and Swelling    Ibuprofen Hives, Itching and Swelling    Iodinated contrast media     Sulfa (sulfonamide antibiotics) Hives, Itching and Swelling       No current facility-administered medications on file prior to encounter.      Current Outpatient Medications on File Prior to Encounter   Medication Sig    bumetanide (BUMEX) 0.5 MG Tab Take 1 tablet (0.5 mg total) by mouth daily as needed.    cloNIDine (CATAPRES) 0.1 MG tablet TAKE ONE TABLET BY MOUTH THREE TIMES DAILY AS NEEDED FOR signs of withdrawal    cyanocobalamin (VITAMIN B-12) 1000 MCG tablet Take 1 tablet (1,000 mcg total) by mouth once daily.    diclofenac sodium (VOLTAREN) 1 % Gel Apply the gel (2 g) to the affected area 4 times daily. Do not apply more than 8 g daily to any one affected joint of the upper extremities.    diphenoxylate-atropine 2.5-0.025 mg (LOMOTIL) 2.5-0.025 mg per tablet Take 1 tablet by mouth 4 (four) times daily as needed for  Diarrhea (for episodic diarrhea).    ergocalciferol (ERGOCALCIFEROL) 50,000 unit Cap Vitamin D2 1,250 mcg (50,000 unit) capsule   TAKE 1 CAPSULE BY MOUTH EVERY WEEK    ferrous sulfate (FEOSOL) 325 mg (65 mg iron) Tab tablet TAKE ONE TABLET BY MOUTH THREE TIMES PER WEEK    hydrocortisone valerate (WEST-KAREN) 0.2 % ointment Apply topically 2 (two) times daily. for 7 days    losartan (COZAAR) 50 MG tablet Take 1 tablet (50 mg total) by mouth once daily.    magnesium oxide (MAG-OX) 400 mg (241.3 mg magnesium) tablet Take 1 tablet (400 mg total) by mouth once daily.    methylPREDNISolone (MEDROL DOSEPACK) 4 mg tablet use as directed    metoprolol tartrate (LOPRESSOR) 50 MG tablet TAKE ONE TABLET BY MOUTH TWICE DAILY    ondansetron (ZOFRAN) 4 MG tablet Take 1 tablet (4 mg total) by mouth every 8 (eight) hours as needed for Nausea.    oxyCODONE (ROXICODONE) 15 MG Tab TK 1 T PO  Q 4 H prn    promethazine (PHENERGAN) 12.5 MG Tab Take 2 tablets (25 mg total) by mouth every 6 (six) hours as needed.    tamsulosin (FLOMAX) 0.4 mg Cap Take 1 capsule (0.4 mg total) by mouth every evening.    trolamine salicylate (ASPERCREME) 10 % cream Apply topically as needed.     Family History     Problem Relation (Age of Onset)    Alzheimer's disease Father    Cancer Mother    No Known Problems Son, Son, Son, Son    Stroke Sister        Tobacco Use    Smoking status: Never Smoker    Smokeless tobacco: Never Used   Substance and Sexual Activity    Alcohol use: No     Alcohol/week: 0.0 standard drinks     Frequency: Never     Binge frequency: Never    Drug use: No    Sexual activity: Not Currently     Review of Systems   Constitutional: Negative for fatigue and fever.   HENT: Negative for congestion.    Respiratory: Negative for cough and chest tightness.    Cardiovascular: Negative for chest pain.   Gastrointestinal: Negative for abdominal distention, abdominal pain, anal bleeding, blood in stool, constipation, diarrhea,  nausea and vomiting.   Musculoskeletal: Negative for arthralgias.   Neurological: Negative for weakness.     Objective:     Vital Signs (Most Recent):  Temp: 97.7 °F (36.5 °C) (01/14/20 0934)  Pulse: 61 (01/14/20 0934)  Resp: 19 (01/14/20 0934)  BP: (!) 184/73 (01/14/20 1332)  SpO2: 98 % (01/14/20 0934) Vital Signs (24h Range):  Temp:  [97.7 °F (36.5 °C)] 97.7 °F (36.5 °C)  Pulse:  [61] 61  Resp:  [19] 19  SpO2:  [98 %] 98 %  BP: (170-225)/(71-97) 184/73     Weight: 83 kg (183 lb)  Body mass index is 29.54 kg/m².    Physical Exam   Constitutional: She is oriented to person, place, and time. She appears well-developed and well-nourished.   HENT:   Head: Normocephalic.   Eyes: Pupils are equal, round, and reactive to light.   Neck: Neck supple.   Cardiovascular: Normal rate, regular rhythm and normal heart sounds. Exam reveals no gallop and no friction rub.   No murmur heard.  Pulmonary/Chest: Breath sounds normal.   Abdominal: There is no tenderness.   Lymphadenopathy:     She has no cervical adenopathy.   Neurological: She is alert and oriented to person, place, and time.   Skin: No rash noted.   Psychiatric: She has a normal mood and affect. Her behavior is normal.         CRANIAL NERVES     CN III, IV, VI   Pupils are equal, round, and reactive to light.       Significant Labs:   Blood Culture: No results for input(s): LABBLOO in the last 48 hours.  CBC:   Recent Labs   Lab 01/14/20  1019   WBC 5.96   HGB 10.7*   HCT 34.6*        CMP:   Recent Labs   Lab 01/14/20  1019      K 3.2*      CO2 27   GLU 97   BUN 10   CREATININE 1.2   CALCIUM 9.6   PROT 7.4   ALBUMIN 3.5   BILITOT 0.5   ALKPHOS 100   AST 13   ALT 6*   ANIONGAP 13   EGFRNONAA 47*     Cardiac Markers: No results for input(s): CKMB, MYOGLOBIN, BNP, TROPISTAT in the last 48 hours.  Lactic Acid:   Recent Labs   Lab 01/14/20  1019   LACTATE 1.2     TSH:   Recent Labs   Lab 11/18/19  1310   TSH 0.967     Urine Culture: No results for  input(s): LABURIN in the last 48 hours.  Urine Studies: No results for input(s): COLORU, APPEARANCEUA, PHUR, SPECGRAV, PROTEINUA, GLUCUA, KETONESU, BILIRUBINUA, OCCULTUA, NITRITE, UROBILINOGEN, LEUKOCYTESUR, RBCUA, WBCUA, BACTERIA, SQUAMEPITHEL, HYALINECASTS in the last 48 hours.    Invalid input(s): WRIGHTCOLEMAN    Significant Imaging: I have reviewed all pertinent imaging results/findings within the past 24 hours.

## 2020-01-14 NOTE — HPI
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 with lower abdominal pain with associated nausea, vomiting, diarrhea, subjective fever she denies fever or chills. Tried Zofran with minimal relief.  Has a history of chronic diarrhea since her cholecystectomy in December of 2017. Patient was seen at the [facility] ED yesterday with similar complaint.  In the ED has hypokalemia, and elevated BP. Start on potassium replacement and labetalol. Admits obs.

## 2020-01-14 NOTE — ED PROVIDER NOTES
Encounter Date: 2020    SCRIBE #1 NOTE: I, Thais Stover, am scribing for, and in the presence of,  Dr. Newman. I have scribed the entire note.       History     Chief Complaint   Patient presents with    Abdominal Pain     loose stools x 1.5 weeks, complains of generalized weakness      Patito TOBIAS Domingo is a 69 y.o. 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    The patient presents to the ED due to lower abdominal pain, associated with nausea/vomiting and diarrhea.  Patient reports she had a cholecystectomy on 17, and has had intermittent diarrhea and abdominal pain since then.  Today, patient present to ED due to nearly constant, brown-colored loose stool associated with vomiting, subjective fever, and generalized weakness.  She tried taking Zofran with minimal relief. She reports she was seen yesterday at [facility] ED for similar complaints, had labs and a CT performed, and was told to come here to see her surgery specialists.    The history is provided by the patient.     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)      Past Surgical History:   Procedure Laterality Date    CATARACT EXTRACTION Left 10/2017     SECTION      CHOLECYSTECTOMY       COLON SURGERY      cystoscope      CYSTOSCOPY W/ RETROGRADES Right 10/10/2019    Procedure: CYSTOSCOPY, WITH RETROGRADE PYELOGRAM;  Surgeon: Gen Isbell MD;  Location: [facility] OR;  Service: Urology;  Laterality: Right;    EYE SURGERY      HYSTERECTOMY  5/1996    LITHOTRIPSY      LIVER BIOPSY  9/14    carcinoid    URETEROSCOPY Right 10/10/2019    Procedure: URETEROSCOPY;  Surgeon: Gen Isbell MD;  Location: [facility] OR;  Service: Urology;  Laterality: Right;    UTERINE FIBROID SURGERY       Family History   Problem Relation Age of Onset    Cancer Mother         unknown    Alzheimer's disease Father     Stroke Sister     No Known Problems Son     No Known Problems Son     No Known Problems Son     No Known Problems Son     Kidney disease Neg Hx      Social History     Tobacco Use    Smoking status: Never Smoker    Smokeless tobacco: Never Used   Substance Use Topics    Alcohol use: No     Alcohol/week: 0.0 standard drinks     Frequency: Never     Binge frequency: Never    Drug use: No     Review of Systems   Constitutional: Positive for fever. Negative for chills.   HENT: Negative for sore throat.    Respiratory: Negative for cough and shortness of breath.    Cardiovascular: Negative for chest pain.   Gastrointestinal: Positive for abdominal pain, diarrhea, nausea and vomiting. Negative for abdominal distention, blood in stool and constipation.   Genitourinary: Negative for dysuria, frequency and urgency.   Musculoskeletal: Negative for back pain.   Skin: Negative for rash and wound.   Neurological: Negative for syncope and weakness.   Hematological: Does not bruise/bleed easily.   Psychiatric/Behavioral: Negative for agitation, behavioral problems and confusion.       Physical Exam     Initial Vitals [01/14/20 0934]   BP Pulse Resp Temp SpO2   (!) 206/81 61 19 97.7 °F (36.5 °C) 98 %      MAP       --         Physical Exam    Nursing note and vitals reviewed.  Constitutional: She appears  well-developed and well-nourished. She is not diaphoretic. No distress.   Appears uncomfortable but in no acute distress.    HENT:   Head: Normocephalic and atraumatic.   Mouth/Throat: Oropharynx is clear and moist.   Eyes: EOM are normal. Pupils are equal, round, and reactive to light.   Neck: No tracheal deviation present.   Cardiovascular: Normal rate, regular rhythm, normal heart sounds and intact distal pulses.   Pulmonary/Chest: Breath sounds normal. No stridor. No respiratory distress. She has no wheezes.   Abdominal: Soft. Bowel sounds are normal. There is tenderness. There is no rebound and no guarding.   Multiple abdominal scars which are well healed.  Generalized abdominal tenderness.  No rebound. No guarding    Musculoskeletal: Normal range of motion. She exhibits no edema.   Neurological: She is alert and oriented to person, place, and time. She has normal strength. No cranial nerve deficit or sensory deficit.   Skin: Skin is warm and dry. Capillary refill takes less than 2 seconds. No pallor.   Psychiatric: She has a normal mood and affect. Her behavior is normal. Thought content normal.         ED Course   Procedures  Labs Reviewed   CBC WITHOUT DIFFERENTIAL - Abnormal; Notable for the following components:       Result Value    RBC 3.90 (*)     Hemoglobin 10.7 (*)     Hematocrit 34.6 (*)     Mean Corpuscular Hemoglobin Conc 30.9 (*)     All other components within normal limits   COMPREHENSIVE METABOLIC PANEL - Abnormal; Notable for the following components:    Potassium 3.2 (*)     ALT 6 (*)     eGFR if  54 (*)     eGFR if non  47 (*)     All other components within normal limits   URINALYSIS, REFLEX TO URINE CULTURE - Abnormal; Notable for the following components:    Ketones, UA 1+ (*)     Occult Blood UA Trace (*)     All other components within normal limits    Narrative:     Preferred Collection Type->Urine, Clean Catch   LIPASE   LACTIC ACID, PLASMA           X-Rays:   Independently Interpreted Readings:   Other Readings:  Reviewed by myself, read by radiology.     Imaging Results          X-Ray Abdomen Flat And Erect (Final result)  Result time 01/14/20 13:16:34    Final result by Herminia Davies MD (01/14/20 13:16:34)                 Impression:      No dilated bowel loops to suggest obstruction.      Electronically signed by: Herminia Davies MD  Date:    01/14/2020  Time:    13:16             Narrative:    EXAMINATION:  XR ABDOMEN FLAT AND ERECT    CLINICAL HISTORY:  Epigastric pain    TECHNIQUE:  Flat and erect AP views of the abdomen were performed.    COMPARISON:  Prior dated 11/18/2019    FINDINGS:  There are cholecystectomy clips in the right upper quadrant.  There are no dilated bowel loops to indicate obstruction.  There is no free subdiaphragmatic air on upright view.  There is a lobulated calcification in the lower pole of the right lining collecting system for spine to nonobstructing calculus seen on previous CT.    There is degenerative change of the lower lumbar spine.                              Medical Decision Making:   History:   Old Medical Records: I decided to obtain old medical records.  Old Records Summarized: records from another hospital.       <> Summary of Records: Patient was seen at [facility] on 1/12/20 due to abdominal pain. Labs and CT were obtained which were unremarkable. pt was discharged home. Patient was seen again at [facility] on 1/13/20. Patient was treated asymptomatically and recommended to follow up with carcinoid specialist. Patient was last seen by Dr. Perez on 10/31. Plan at that time was to obtain CT, resume injections and follow up in one month. Multiple notes in EPIC reveal patient has transportation issues limiting her imaging and appointment follow up.   Differential Diagnosis:   Differential Diagnosis includes, but is not limited to:  AAA, aortic dissection, mesenteric ischemia, perforated viscous, MI/ACS,  SBO/volvulus, incarcerated/strangulated hernia, intussusception, ileus, appendicitis, cholecystitis, cholangitis, diverticulitis, esophagitis, hepatitis, nephrolithiasis, pancreatitis, gastroenteritis, colitis, IBD/IBS, biliary colic, GERD, PUD, constipation, UTI/pyelonephritis,  disorder.    Clinical Tests:   Lab Tests: Ordered and Reviewed  Radiological Study: Ordered and Reviewed  Medical Tests: Reviewed and Ordered    On re-evaluation, the patient's status has remained stable.  At this time, I believe the patient should be admitted to the hospital for further evaluation and management of hypokalemia.  [facility] service was contacted and the case was discussed.   The consulting physician/team agrees with plan and will admit under their service.   The patient and family were updated with test results, overall impression, and further plan of care. All questions were answered. The patient expressed understanding and agrees with the current plan.                   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  She was seen in the ER several times over the last few days for similar symptoms, and was instructed follow up with her specialists.    Blood pressure elevated on arrival, vitals otherwise unremarkable.  Exam with generalized abdominal tenderness without rebound, guarding, distention, or peritoneal signs.    Will obtain labs, treat symptomatically, and discuss with General surgery for evaluation and further recommendations.    [SS]   1143 Labs with hypokalemia to 3.2, otherwise reassuring and at baseline.  K replacement ordered p.o.  Patient evaluated by General Surgery, who feel there is no indication for intervention or  admission at this time.  Recommend discharge with Neuroendocrine Clinic follow-up.    [SS]   4525   Given patient's persistent symptoms, uncontrolled blood pressure, and hypokalemia, I feel patient would benefit from an observation stay for hydration, symptomatic control, replacement of K, and blood pressure management.    [facility] contacted, case was discussed, and Dr. Perry will place in observation for continued management.    [SS]      ED Course User Index  [SS] Abhi Newman MD                Clinical Impression:       ICD-10-CM ICD-9-CM   1. Hypokalemia E87.6 276.8   2. Abdominal pain, epigastric R10.13 789.06   3. Nausea vomiting and diarrhea R11.2 787.91    R19.7 787.01   4. Weakness generalized R53.1 780.79         Disposition:   Disposition: Placed in Observation  Condition: Fair    I, Dr. Abhi Newman, personally performed the services described in this documentation. All medical record entries made by the scribe were at my direction and in my presence.  I have reviewed the chart and agree that the record reflects my personal performance and is accurate and complete.     Abhi Newman MD.               Abhi Newman MD  01/15/20 3612

## 2020-01-14 NOTE — ASSESSMENT & PLAN NOTE
Nausea vomiting and diarrhea  KUB: No dilated bowel loops to suggest obstruction.  Antiemetics  IVF  Clear liquid diet and advance as tolerated

## 2020-01-15 ENCOUNTER — TELEPHONE (OUTPATIENT)
Dept: FAMILY MEDICINE | Facility: CLINIC | Age: 70
End: 2020-01-15

## 2020-01-15 ENCOUNTER — TELEPHONE (OUTPATIENT)
Dept: NEUROLOGY | Facility: HOSPITAL | Age: 70
End: 2020-01-15

## 2020-01-15 ENCOUNTER — OUTPATIENT CASE MANAGEMENT (OUTPATIENT)
Dept: ADMINISTRATIVE | Facility: OTHER | Age: 70
End: 2020-01-15

## 2020-01-15 VITALS
HEART RATE: 77 BPM | WEIGHT: 175.25 LBS | BODY MASS INDEX: 28.16 KG/M2 | TEMPERATURE: 98 F | OXYGEN SATURATION: 96 % | DIASTOLIC BLOOD PRESSURE: 65 MMHG | HEIGHT: 66 IN | SYSTOLIC BLOOD PRESSURE: 143 MMHG | RESPIRATION RATE: 18 BRPM

## 2020-01-15 LAB
ANION GAP SERPL CALC-SCNC: 12 MMOL/L (ref 8–16)
BASOPHILS # BLD AUTO: 0.01 K/UL (ref 0–0.2)
BASOPHILS NFR BLD: 0.1 % (ref 0–1.9)
BUN SERPL-MCNC: 10 MG/DL (ref 8–23)
C DIFF GDH STL QL: NEGATIVE
C DIFF TOX A+B STL QL IA: NEGATIVE
CALCIUM SERPL-MCNC: 9.2 MG/DL (ref 8.7–10.5)
CHLORIDE SERPL-SCNC: 106 MMOL/L (ref 95–110)
CO2 SERPL-SCNC: 23 MMOL/L (ref 23–29)
CREAT SERPL-MCNC: 1 MG/DL (ref 0.5–1.4)
DIFFERENTIAL METHOD: ABNORMAL
EOSINOPHIL # BLD AUTO: 0.1 K/UL (ref 0–0.5)
EOSINOPHIL NFR BLD: 0.7 % (ref 0–8)
ERYTHROCYTE [DISTWIDTH] IN BLOOD BY AUTOMATED COUNT: 14.3 % (ref 11.5–14.5)
EST. GFR  (AFRICAN AMERICAN): >60 ML/MIN/1.73 M^2
EST. GFR  (NON AFRICAN AMERICAN): 58 ML/MIN/1.73 M^2
GLUCOSE SERPL-MCNC: 116 MG/DL (ref 70–110)
HCT VFR BLD AUTO: 36.2 % (ref 37–48.5)
HGB BLD-MCNC: 11.2 G/DL (ref 12–16)
LYMPHOCYTES # BLD AUTO: 1.3 K/UL (ref 1–4.8)
LYMPHOCYTES NFR BLD: 18.7 % (ref 18–48)
MAGNESIUM SERPL-MCNC: 1.5 MG/DL (ref 1.6–2.6)
MCH RBC QN AUTO: 27.7 PG (ref 27–31)
MCHC RBC AUTO-ENTMCNC: 30.9 G/DL (ref 32–36)
MCV RBC AUTO: 89 FL (ref 82–98)
MONOCYTES # BLD AUTO: 0.7 K/UL (ref 0.3–1)
MONOCYTES NFR BLD: 10.5 % (ref 4–15)
NEUTROPHILS # BLD AUTO: 4.8 K/UL (ref 1.8–7.7)
NEUTROPHILS NFR BLD: 70 % (ref 38–73)
PHOSPHATE SERPL-MCNC: 2.3 MG/DL (ref 2.7–4.5)
PLATELET # BLD AUTO: 221 K/UL (ref 150–350)
PMV BLD AUTO: 10 FL (ref 9.2–12.9)
POTASSIUM SERPL-SCNC: 3.7 MMOL/L (ref 3.5–5.1)
RBC # BLD AUTO: 4.05 M/UL (ref 4–5.4)
SODIUM SERPL-SCNC: 141 MMOL/L (ref 136–145)
WBC # BLD AUTO: 6.94 K/UL (ref 3.9–12.7)

## 2020-01-15 PROCEDURE — 25000003 PHARM REV CODE 250: Mod: HCNC | Performed by: NURSE PRACTITIONER

## 2020-01-15 PROCEDURE — 87324 CLOSTRIDIUM AG IA: CPT | Mod: HCNC

## 2020-01-15 PROCEDURE — 85025 COMPLETE CBC W/AUTO DIFF WBC: CPT | Mod: HCNC

## 2020-01-15 PROCEDURE — 80048 BASIC METABOLIC PNL TOTAL CA: CPT | Mod: HCNC

## 2020-01-15 PROCEDURE — 25000003 PHARM REV CODE 250: Mod: HCNC | Performed by: HOSPITALIST

## 2020-01-15 PROCEDURE — 96376 TX/PRO/DX INJ SAME DRUG ADON: CPT

## 2020-01-15 PROCEDURE — 87449 NOS EACH ORGANISM AG IA: CPT | Mod: HCNC

## 2020-01-15 PROCEDURE — 63600175 PHARM REV CODE 636 W HCPCS: Mod: HCNC | Performed by: NURSE PRACTITIONER

## 2020-01-15 PROCEDURE — G0378 HOSPITAL OBSERVATION PER HR: HCPCS | Mod: HCNC

## 2020-01-15 PROCEDURE — 84100 ASSAY OF PHOSPHORUS: CPT | Mod: HCNC

## 2020-01-15 PROCEDURE — 25000003 PHARM REV CODE 250: Mod: HCNC | Performed by: FAMILY MEDICINE

## 2020-01-15 PROCEDURE — 36415 COLL VENOUS BLD VENIPUNCTURE: CPT | Mod: HCNC

## 2020-01-15 PROCEDURE — 83735 ASSAY OF MAGNESIUM: CPT | Mod: HCNC

## 2020-01-15 RX ORDER — OXYCODONE HCL 10 MG/1
10 TABLET, FILM COATED, EXTENDED RELEASE ORAL EVERY 12 HOURS
Status: DISCONTINUED | OUTPATIENT
Start: 2020-01-15 | End: 2020-01-15 | Stop reason: HOSPADM

## 2020-01-15 RX ORDER — METRONIDAZOLE 500 MG/1
500 TABLET ORAL 3 TIMES DAILY
Qty: 30 TABLET | Refills: 0 | Status: SHIPPED | OUTPATIENT
Start: 2020-01-15 | End: 2020-07-21

## 2020-01-15 RX ORDER — LOPERAMIDE HYDROCHLORIDE 2 MG/1
4 CAPSULE ORAL 3 TIMES DAILY PRN
Qty: 10 CAPSULE | Refills: 0 | Status: SHIPPED | OUTPATIENT
Start: 2020-01-15 | End: 2020-01-16 | Stop reason: SDUPTHER

## 2020-01-15 RX ORDER — LANOLIN ALCOHOL/MO/W.PET/CERES
400 CREAM (GRAM) TOPICAL 2 TIMES DAILY
Status: DISCONTINUED | OUTPATIENT
Start: 2020-01-15 | End: 2020-01-15 | Stop reason: HOSPADM

## 2020-01-15 RX ORDER — HYDROCODONE BITARTRATE AND ACETAMINOPHEN 10; 325 MG/1; MG/1
1 TABLET ORAL EVERY 4 HOURS PRN
Status: DISCONTINUED | OUTPATIENT
Start: 2020-01-15 | End: 2020-01-15 | Stop reason: HOSPADM

## 2020-01-15 RX ORDER — LOPERAMIDE HYDROCHLORIDE 2 MG/1
4 CAPSULE ORAL ONCE
Status: COMPLETED | OUTPATIENT
Start: 2020-01-15 | End: 2020-01-15

## 2020-01-15 RX ORDER — OXYCODONE HCL 10 MG/1
10 TABLET, FILM COATED, EXTENDED RELEASE ORAL ONCE
Status: COMPLETED | OUTPATIENT
Start: 2020-01-15 | End: 2020-01-15

## 2020-01-15 RX ORDER — METRONIDAZOLE 500 MG/1
500 TABLET ORAL 3 TIMES DAILY
Qty: 10 TABLET | Refills: 0 | Status: SHIPPED | OUTPATIENT
Start: 2020-01-15 | End: 2020-01-15 | Stop reason: SDUPTHER

## 2020-01-15 RX ORDER — PROMETHAZINE HYDROCHLORIDE 12.5 MG/1
12.5 TABLET ORAL EVERY 8 HOURS PRN
Qty: 6 TABLET | Refills: 0 | Status: ON HOLD | OUTPATIENT
Start: 2020-01-15 | End: 2021-03-15

## 2020-01-15 RX ORDER — CIPROFLOXACIN 500 MG/1
500 TABLET ORAL 2 TIMES DAILY
Qty: 20 TABLET | Refills: 0 | Status: SHIPPED | OUTPATIENT
Start: 2020-01-15 | End: 2020-07-21

## 2020-01-15 RX ORDER — TALC
6 POWDER (GRAM) TOPICAL NIGHTLY PRN
Status: COMPLETED | OUTPATIENT
Start: 2020-01-15 | End: 2020-01-15

## 2020-01-15 RX ADMIN — Medication 6 MG: at 01:01

## 2020-01-15 RX ADMIN — Medication 400 MG: at 10:01

## 2020-01-15 RX ADMIN — METOPROLOL TARTRATE 50 MG: 50 TABLET ORAL at 08:01

## 2020-01-15 RX ADMIN — LOSARTAN POTASSIUM 50 MG: 50 TABLET ORAL at 08:01

## 2020-01-15 RX ADMIN — MORPHINE SULFATE 2 MG: 4 INJECTION, SOLUTION INTRAMUSCULAR; INTRAVENOUS at 03:01

## 2020-01-15 RX ADMIN — OXYCODONE HYDROCHLORIDE 10 MG: 10 TABLET, FILM COATED, EXTENDED RELEASE ORAL at 01:01

## 2020-01-15 RX ADMIN — OXYCODONE HYDROCHLORIDE 10 MG: 10 TABLET, FILM COATED, EXTENDED RELEASE ORAL at 10:01

## 2020-01-15 RX ADMIN — HYDROCODONE BITARTRATE AND ACETAMINOPHEN 1 TABLET: 10; 325 TABLET ORAL at 04:01

## 2020-01-15 RX ADMIN — LOPERAMIDE HYDROCHLORIDE 4 MG: 2 CAPSULE ORAL at 11:01

## 2020-01-15 NOTE — PLAN OF CARE
Pt states her son cannot pick her up until later this afternoon.  I offered to set up transportation home sooner and pt refused.  She states she wants to wait for her son to pick her up.  I let her know that she is discharged and will leave hospital today.  She verbalized understanding.     01/15/20 1155   Post-Acute Status   Post-Acute Authorization Other     Андрей Mensah RN,   [phone]

## 2020-01-15 NOTE — PROGRESS NOTES
SW received three VM from pt. She stated being in the hospital and needing assistance. She stated she has had bad Diarrhea and she needs stay in the hospital.   Attempted to contact pt and left VM.     SW contacted inpatient  and spoke with Андрей Mensah RN stated she is ready for D/C. She was admitted to the ED and then brought to OBS. She reports there is nothing medically they can do and told pt of d/c and follow up appointment. She reports the pt not wanting to leave. Pt reports wanting to wait on son for ride even though inpatient team could schedule transport.     SW received call from pt. Provided clarification on d/c plans and that I had no ability to make her inpatient. I encouraged her to follow up at her appointment and I would have OPCM RN give her a call .

## 2020-01-15 NOTE — PLAN OF CARE
VN note: VN completed AVS and attachments and notified bedside nurseMargi. Waiting for family to arrive to admin discharge education. Will cont to be available and intervene prn.

## 2020-01-15 NOTE — TELEPHONE ENCOUNTER
Pt called from hospital room wanting to have Dr. Perry admit her and help control the diarrhea. She was told that I would get Dr. Perry the message. Pt verbalized understanding

## 2020-01-15 NOTE — SUBJECTIVE & OBJECTIVE
Interval History: awake and alert, requesting to have her pain medications and stay one more night.   Patient stated she did not like the hydralazine injection.   Replace mag  Updated patient on lab findings and medically ready to be discharge      Review of Systems   Constitutional: Positive for activity change. Negative for fatigue and fever.   Respiratory: Negative for cough and chest tightness.    Cardiovascular: Negative for chest pain.   Gastrointestinal: Negative for abdominal distention, abdominal pain, anal bleeding, blood in stool, diarrhea, nausea, rectal pain and vomiting.   Musculoskeletal: Negative for arthralgias.   Neurological: Negative for weakness.     Objective:     Vital Signs (Most Recent):  Temp: 99.4 °F (37.4 °C) (01/15/20 0739)  Pulse: 77 (01/15/20 0743)  Resp: 18 (01/15/20 0739)  BP: (!) 155/69 (01/15/20 0739)  SpO2: 96 % (01/15/20 0137) Vital Signs (24h Range):  Temp:  [97 °F (36.1 °C)-99.4 °F (37.4 °C)] 99.4 °F (37.4 °C)  Pulse:  [61-84] 77  Resp:  [16-24] 18  SpO2:  [96 %-99 %] 96 %  BP: (109-225)/(56-97) 155/69     Weight: 79.5 kg (175 lb 4.3 oz)  Body mass index is 28.29 kg/m².    Intake/Output Summary (Last 24 hours) at 1/15/2020 0931  Last data filed at 1/15/2020 0509  Gross per 24 hour   Intake 900 ml   Output 600 ml   Net 300 ml      Physical Exam   Constitutional: She is oriented to person, place, and time. She appears well-developed and well-nourished.   HENT:   Head: Normocephalic.   Eyes: Pupils are equal, round, and reactive to light.   Neck: Neck supple.   Cardiovascular: Normal rate, regular rhythm and normal heart sounds. Exam reveals no gallop and no friction rub.   No murmur heard.  Pulmonary/Chest: Breath sounds normal.   Abdominal: There is no tenderness.   Neurological: She is alert and oriented to person, place, and time.   Skin: No rash noted.   Psychiatric: She has a normal mood and affect. Her behavior is normal.       Significant Labs:   Blood Culture: No results  for input(s): LABBLOO in the last 48 hours.  CBC:   Recent Labs   Lab 01/14/20  1019 01/15/20  0312   WBC 5.96 6.94   HGB 10.7* 11.2*   HCT 34.6* 36.2*    221     CMP:   Recent Labs   Lab 01/14/20  1019 01/15/20  0312    141   K 3.2* 3.7    106   CO2 27 23   GLU 97 116*   BUN 10 10   CREATININE 1.2 1.0   CALCIUM 9.6 9.2   PROT 7.4  --    ALBUMIN 3.5  --    BILITOT 0.5  --    ALKPHOS 100  --    AST 13  --    ALT 6*  --    ANIONGAP 13 12   EGFRNONAA 47* 58*     Lactic Acid:   Recent Labs   Lab 01/14/20  1019   LACTATE 1.2     Troponin: No results for input(s): TROPONINI in the last 48 hours.  TSH:   Recent Labs   Lab 11/18/19  1310   TSH 0.967       Significant Imaging: I have reviewed all pertinent imaging results/findings within the past 24 hours.

## 2020-01-15 NOTE — HOSPITAL COURSE
Admitted with nausea, vomiting and hypokalemia. Symptoms improved. K replaced. Medically ready for discharge

## 2020-01-15 NOTE — TELEPHONE ENCOUNTER
Left message for the patient to return staff call. Please address you concerns with the hospital staff, we don't generally advise when the pt is admitted to the hospital

## 2020-01-15 NOTE — DISCHARGE INSTRUCTIONS
Loperamide tablets or capsules (English) View Edit Remove  Promethazine tablets (English) View Edit Remove  Hypokalemia, Discharge Instructions (English) View Edit Remove  High Blood Pressure (Hypertension), Discharge Instructions (English) View Edit Remove

## 2020-01-15 NOTE — PLAN OF CARE
Pt requesting transport home.  Transportation set up through [facility] for 6:00pm.     01/15/20 1552   Post-Acute Status   Post-Acute Authorization Other     Андрей Mensah RN, CM  [phone]

## 2020-01-15 NOTE — TELEPHONE ENCOUNTER
----- Message from aBmbi Mensah RN sent at 1/15/2020  9:09 AM CST -----  Good Morning,   This pt is at [facility] and will discharge today.  She has an appt scheduled for 1/20/20 and is asking if it can be changed to 1/21/20.  Can you call me and let me know if this is an option for her please.  Thank you for your help,  Андрей Mensah RN, CM  [phone]

## 2020-01-15 NOTE — TELEPHONE ENCOUNTER
Pt called wanted to change her appt and we changed it to 1/23/20 at 940 am. Pt verbalized understanding

## 2020-01-15 NOTE — PLAN OF CARE
AVS and educational attachments shared with patient via [employer]. Discussed thoroughly. Patient verbalized clear understanding using teach back method. Notified bedside nurse of completion of education. At present no distress noted. Patient to be discharged via w/c with arranged transportation with all of patient's belonings. Will cont to be available to patient and family and intervene prn.

## 2020-01-15 NOTE — PROGRESS NOTES
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 Tried Zofran with minimal relief.  Has a history of chronic diarrhea since her cholecystectomy in December of 2017. Patient was seen at the [facility] ED yesterday with similar complaint.  In the ED has hypokalemia, and elevated BP. Start on potassium replacement and labetalol. Admits obs.     Overview/Hospital Course:  Admitted with nausea, vomiting and hypokalemia. Symptoms improved. K replaced. Medically ready for discharge      Interval History: awake and alert, requesting to have her pain medications and stay one more night.   Patient stated she did not like the hydralazine injection.   Replace mag  Updated patient on lab findings and medically ready to be discharge      Review of Systems   Constitutional: Positive for activity change. Negative for fatigue and fever.   Respiratory: Negative for cough and chest tightness.    Cardiovascular: Negative for chest pain.   Gastrointestinal: Negative for abdominal distention, abdominal pain, anal bleeding, blood in stool, diarrhea, nausea, rectal pain and vomiting.   Musculoskeletal: Negative for arthralgias.   Neurological: Negative for weakness.     Objective:     Vital Signs (Most Recent):  Temp: 99.4 °F (37.4 °C) (01/15/20 0739)  Pulse: 77 (01/15/20 0743)  Resp: 18 (01/15/20 0739)  BP: (!) 155/69  (01/15/20 0739)  SpO2: 96 % (01/15/20 0137) Vital Signs (24h Range):  Temp:  [97 °F (36.1 °C)-99.4 °F (37.4 °C)] 99.4 °F (37.4 °C)  Pulse:  [61-84] 77  Resp:  [16-24] 18  SpO2:  [96 %-99 %] 96 %  BP: (109-225)/(56-97) 155/69     Weight: 79.5 kg (175 lb 4.3 oz)  Body mass index is 28.29 kg/m².    Intake/Output Summary (Last 24 hours) at 1/15/2020 0931  Last data filed at 1/15/2020 0509  Gross per 24 hour   Intake 900 ml   Output 600 ml   Net 300 ml      Physical Exam   Constitutional: She is oriented to person, place, and time. She appears well-developed and well-nourished.   HENT:   Head: Normocephalic.   Eyes: Pupils are equal, round, and reactive to light.   Neck: Neck supple.   Cardiovascular: Normal rate, regular rhythm and normal heart sounds. Exam reveals no gallop and no friction rub.   No murmur heard.  Pulmonary/Chest: Breath sounds normal.   Abdominal: There is no tenderness.   Neurological: She is alert and oriented to person, place, and time.   Skin: No rash noted.   Psychiatric: She has a normal mood and affect. Her behavior is normal.       Significant Labs:   Blood Culture: No results for input(s): LABBLOO in the last 48 hours.  CBC:   Recent Labs   Lab 01/14/20  1019 01/15/20  0312   WBC 5.96 6.94   HGB 10.7* 11.2*   HCT 34.6* 36.2*    221     CMP:   Recent Labs   Lab 01/14/20  1019 01/15/20  0312    141   K 3.2* 3.7    106   CO2 27 23   GLU 97 116*   BUN 10 10   CREATININE 1.2 1.0   CALCIUM 9.6 9.2   PROT 7.4  --    ALBUMIN 3.5  --    BILITOT 0.5  --    ALKPHOS 100  --    AST 13  --    ALT 6*  --    ANIONGAP 13 12   EGFRNONAA 47* 58*     Lactic Acid:   Recent Labs   Lab 01/14/20  1019   LACTATE 1.2     Troponin: No results for input(s): TROPONINI in the last 48 hours.  TSH:   Recent Labs   Lab 11/18/19  1310   TSH 0.967       Significant Imaging: I have reviewed all pertinent imaging results/findings within the past 24 hours.      Assessment/Plan:      * Generalized abdominal  pain  Nausea vomiting and diarrhea  KUB: No dilated bowel loops to suggest obstruction.  Antiemetics  IVF  Clear liquid diet and advance as tolerated    Hypokalemia  Replace          Essential hypertension  Restart home meds Metoprolol and Losartan  Hydralazine prn      Resistant hypertension        Neuroendocrine carcinoma, unknown primary site  Metastatic carcinoid tumor  Secondary neuroendocrine tumor of liver  Monitor symptom  Consult NET group         VTE Risk Mitigation (From admission, onward)         Ordered     enoxaparin injection 40 mg  Daily      01/14/20 1602     IP VTE HIGH RISK PATIENT  Once      01/14/20 1602                      Noemí MEME Tovar MD  Department of Hospital Medicine   [facility]

## 2020-01-15 NOTE — PLAN OF CARE
Rounds completed on pt.  All questions addressed.  Bedside nurse to discuss d/c medications.  Discussed importance to attend all f/u appts and take medications as prescribed.  Verbalized understanding.    Future Appointments   Date Time Provider Department Center   1/16/2020  9:30 AM Scarlett Irby PTA [facility]   1/16/2020 11:00 AM ANYA [facility]   1/20/2020 10:40 AM Chris Herbert MD [facility]   1/21/2020 11:00 AM ANYA [facility]   1/23/2020 11:00 AM ANYA [facility]   3/2/2020 10:20 AM Pantera Rosen MD [facility]        01/15/20 0944   Final Note   Assessment Type Final Discharge Note   Anticipated Discharge Disposition Home   Hospital Follow Up  Appt(s) scheduled? Yes   Discharge plans and expectations educations in teach back method with documentation complete? Yes   Right Care Referral Info   Post Acute Recommendation No Care     Андрей Mensah RN,   [phone]

## 2020-01-15 NOTE — DISCHARGE SUMMARY
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 with lower abdominal pain with associated nausea, vomiting, diarrhea, subjective fever she denies fever or chills. Tried Zofran with minimal relief.  Has a history of chronic diarrhea since her cholecystectomy in December of 2017. Patient was seen at the [facility] ED yesterday with similar complaint.  In the ED has hypokalemia, and elevated BP. Start on potassium replacement and labetalol. Admits obs.     * No surgery found *      Hospital Course:   Admitted with nausea, vomiting and hypokalemia. Symptoms improved. K replaced. Medically ready for discharge       Consults:   Consults (From admission, onward)        Status Ordering Provider     IP consult to case management  Once     Provider:  (Not yet assigned)    Acknowledged NOEMÍ WU          * Generalized abdominal pain  Nausea vomiting and diarrhea  KUB: No dilated bowel loops to suggest obstruction.  Antiemetics  IVF  Clear liquid diet and advance as tolerated    Hypokalemia  Replace          Essential hypertension  Restart home meds Metoprolol and Losartan  Hydralazine prn      Neuroendocrine carcinoma, unknown primary site  Metastatic carcinoid tumor  Secondary neuroendocrine tumor of liver  Monitor symptom  Consult NET group         Final Active Diagnoses:    Diagnosis Date Noted POA    PRINCIPAL PROBLEM:  Generalized abdominal pain [R10.84] 01/14/2020 Yes    Hypokalemia  [E87.6] 01/14/2020 Yes    Nausea vomiting and diarrhea [R11.2, R19.7] 01/14/2020 Yes    Essential hypertension [I10] 11/19/2019 Yes     Chronic    Chronic pain syndrome [G89.4] 12/02/2018 Yes    Metastatic carcinoid tumor [C7B.00] 01/27/2015 Yes     Chronic    Neuroendocrine carcinoma, unknown primary site [C7A.8] 12/01/2014 Yes    Secondary neuroendocrine tumor of liver [C7B.8] 12/01/2014 Yes      Problems Resolved During this Admission:       Discharged Condition: stable    Disposition: Home or Self Care    Follow Up:  Follow-up Information     Pantera Rosen MD In 2 weeks.    Specialties:  Internal Medicine, Pediatrics  Contact information:  [address]  [place] [address] [phone]                 Patient Instructions:      Diet Cardiac     Activity as tolerated       Significant Diagnostic Studies:     Pending Diagnostic Studies:     None         Medications:  Reconciled Home Medications:      Medication List      START taking these medications    ciprofloxacin HCl 500 MG tablet  Commonly known as:  CIPRO  Take 1 tablet (500 mg total) by mouth 2 (two) times daily.     loperamide 2 mg capsule  Commonly known as:  IMODIUM  Take 2 capsules (4 mg total) by mouth 3 (three) times daily as needed.     metroNIDAZOLE 500 MG tablet  Commonly known as:  FLAGYL  Take 1 tablet (500 mg total) by mouth 3 (three) times daily.     promethazine 12.5 MG Tab  Commonly known as:  PHENERGAN  Take 1 tablet (12.5 mg total) by mouth every 8 (eight) hours as needed (nausea).        CONTINUE taking these medications    bumetanide 0.5 MG Tab  Commonly known as:  BUMEX  Take 1 tablet (0.5 mg total) by mouth daily as needed.     cloNIDine 0.1 MG tablet  Commonly known as:  CATAPRES  TAKE ONE TABLET BY MOUTH THREE TIMES DAILY AS NEEDED FOR signs of withdrawal     cyanocobalamin 1000 MCG tablet  Commonly known as:  Vitamin B-12  Take 1 tablet (1,000 mcg total) by mouth once daily.     diclofenac sodium 1 % Gel  Commonly known  as:  Voltaren  Apply the gel (2 g) to the affected area 4 times daily. Do not apply more than 8 g daily to any one affected joint of the upper extremities.     diphenoxylate-atropine 2.5-0.025 mg 2.5-0.025 mg per tablet  Commonly known as:  LOMOTIL  Take 1 tablet by mouth 4 (four) times daily as needed for Diarrhea (for episodic diarrhea).     ferrous sulfate 325 mg (65 mg iron) Tab tablet  Commonly known as:  FEOSOL  TAKE ONE TABLET BY MOUTH THREE TIMES PER WEEK     hydrocortisone valerate 0.2 % ointment  Commonly known as:  WEST-KAREN  Apply topically 2 (two) times daily. for 7 days     losartan 50 MG tablet  Commonly known as:  COZAAR  Take 1 tablet (50 mg total) by mouth once daily.     metoprolol tartrate 50 MG tablet  Commonly known as:  LOPRESSOR  TAKE ONE TABLET BY MOUTH TWICE DAILY     ondansetron 4 MG tablet  Commonly known as:  ZOFRAN  Take 1 tablet (4 mg total) by mouth every 8 (eight) hours as needed for Nausea.     oxyCODONE 15 MG Tab  Commonly known as:  ROXICODONE  TK 1 T PO  Q 4 H prn     tamsulosin 0.4 mg Cap  Commonly known as:  FLOMAX  Take 1 capsule (0.4 mg total) by mouth every evening.     trolamine salicylate 10 % cream  Commonly known as:  ASPERCREME  Apply topically as needed.            Indwelling Lines/Drains at time of discharge:   Lines/Drains/Airways     None                 Time spent on the discharge of patient: 35 minutes  Patient was seen and examined on the date of discharge and determined to be suitable for discharge.         Noemí Tovar MD  Department of Hospital Medicine  [facility]

## 2020-01-15 NOTE — ED NOTES
Pt escorted to and from restroom via wheelchair.  Pt voided urine only.  Pt states that she normally ambulates at home with walker.  Pt able to stand and transfer without difficulty.

## 2020-01-15 NOTE — TELEPHONE ENCOUNTER
----- Message from Betty Kerr sent at 1/15/2020 10:15 AM CST -----  Contact: Self: [phone]  Type: Patient Call Back    Who called: Self    :Patient called regarding needing help with getting control over her Diahrrea before she leaves the hospital. Patient is in Room 429 at the hospital        Can the clinic reply by [facility]? No    Would the patient rather a call back or a response via [facility]?     Best call back number:[phone]

## 2020-01-15 NOTE — H&P
[facility] Hospital Medicine  History & Physical    Patient Name: Patito Domingo  MRN: [id number]  Admission Date: 1/14/2020  Attending Physician: Noemí Tovar MD  Primary Care Provider: Pantera Rosen MD         Patient information was obtained from patient and ER records.     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 Tried Zofran with minimal relief.  Has a history of chronic diarrhea since her cholecystectomy in December of 2017. Patient was seen at the [facility] ED yesterday with similar complaint.  In the ED has hypokalemia, and elevated BP. Start on potassium replacement and labetalol. Admits obs.     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)        Past Surgical History:   Procedure Laterality Date    CATARACT EXTRACTION Left 10/2017    CHOLECYSTECTOMY      COLON SURGERY      cystoscope      CYSTOSCOPY W/ RETROGRADES Right 10/10/2019    Procedure: CYSTOSCOPY, WITH RETROGRADE PYELOGRAM;  Surgeon: Gen Isbell MD;  Location: [facility] OR;  Service: Urology;  Laterality: Right;    EYE SURGERY      HYSTERECTOMY  5/1996    LITHOTRIPSY      LIVER BIOPSY  9/14    carcinoid    URETEROSCOPY Right 10/10/2019    Procedure: URETEROSCOPY;  Surgeon: Gen Isbell MD;  [facility];  Service: Urology;  Laterality: Right;       Review of patient's allergies indicates:   Allergen Reactions    Epinephrine Anaphylaxis     Can cause  a Carcinoid Crisis    Contrast media Hives, Itching and Swelling    Ibuprofen Hives, Itching and Swelling    Iodinated contrast media     Sulfa (sulfonamide antibiotics) Hives, Itching and Swelling       No current facility-administered medications on file prior to encounter.      Current Outpatient Medications on File Prior to Encounter   Medication Sig    bumetanide (BUMEX) 0.5 MG Tab Take 1 tablet (0.5 mg total) by mouth daily as needed.    cloNIDine (CATAPRES) 0.1 MG tablet TAKE ONE TABLET BY MOUTH THREE TIMES DAILY AS NEEDED FOR signs of withdrawal    cyanocobalamin (VITAMIN B-12) 1000 MCG tablet Take 1 tablet (1,000 mcg total) by mouth once daily.    diclofenac sodium (VOLTAREN) 1 % Gel Apply the gel (2 g) to the affected area 4 times daily. Do not apply more than 8 g daily to any one affected joint of the upper extremities.    diphenoxylate-atropine 2.5-0.025 mg (LOMOTIL) 2.5-0.025 mg per tablet Take 1 tablet by mouth 4 (four) times daily as needed for Diarrhea (for episodic diarrhea).    ergocalciferol (ERGOCALCIFEROL) 50,000 unit Cap Vitamin D2 1,250 mcg (50,000 unit) capsule   TAKE 1 CAPSULE BY MOUTH EVERY WEEK    ferrous sulfate (FEOSOL) 325 mg (65 mg iron) Tab tablet TAKE ONE TABLET BY MOUTH THREE TIMES PER WEEK    hydrocortisone valerate (WEST-KAREN) 0.2 % ointment Apply topically 2 (two) times daily. for 7 days    losartan (COZAAR) 50 MG tablet Take 1 tablet (50 mg total) by mouth once daily.    magnesium oxide (MAG-OX) 400 mg (241.3 mg magnesium) tablet Take 1 tablet (400 mg total) by mouth once daily.    methylPREDNISolone (MEDROL DOSEPACK) 4 mg tablet use as directed    metoprolol tartrate (LOPRESSOR) 50 MG tablet TAKE ONE TABLET BY MOUTH TWICE DAILY    ondansetron (ZOFRAN) 4 MG tablet Take 1 tablet (4 mg total) by mouth every  8 (eight) hours as needed for Nausea.    oxyCODONE (ROXICODONE) 15 MG Tab TK 1 T PO  Q 4 H prn    promethazine (PHENERGAN) 12.5 MG Tab Take 2 tablets (25 mg total) by mouth every 6 (six) hours as needed.    tamsulosin (FLOMAX) 0.4 mg Cap Take 1 capsule (0.4 mg total) by mouth every evening.    trolamine salicylate (ASPERCREME) 10 % cream Apply topically as needed.     Family History     Problem Relation (Age of Onset)    Alzheimer's disease Father    Cancer Mother    No Known Problems Son, Son, Son, Son    Stroke Sister        Tobacco Use    Smoking status: Never Smoker    Smokeless tobacco: Never Used   Substance and Sexual Activity    Alcohol use: No     Alcohol/week: 0.0 standard drinks     Frequency: Never     Binge frequency: Never    Drug use: No    Sexual activity: Not Currently     Review of Systems   Constitutional: Negative for fatigue and fever.   HENT: Negative for congestion.    Respiratory: Negative for cough and chest tightness.    Cardiovascular: Negative for chest pain.   Gastrointestinal: Negative for abdominal distention, abdominal pain, anal bleeding, blood in stool, constipation, diarrhea, nausea and vomiting.   Musculoskeletal: Negative for arthralgias.   Neurological: Negative for weakness.     Objective:     Vital Signs (Most Recent):  Temp: 97.7 °F (36.5 °C) (01/14/20 0934)  Pulse: 61 (01/14/20 0934)  Resp: 19 (01/14/20 0934)  BP: (!) 184/73 (01/14/20 1332)  SpO2: 98 % (01/14/20 0934) Vital Signs (24h Range):  Temp:  [97.7 °F (36.5 °C)] 97.7 °F (36.5 °C)  Pulse:  [61] 61  Resp:  [19] 19  SpO2:  [98 %] 98 %  BP: (170-225)/(71-97) 184/73     Weight: 83 kg (183 lb)  Body mass index is 29.54 kg/m².    Physical Exam   Constitutional: She is oriented to person, place, and time. She appears well-developed and well-nourished.   HENT:   Head: Normocephalic.   Eyes: Pupils are equal, round, and reactive to light.   Neck: Neck supple.   Cardiovascular: Normal rate, regular rhythm and normal  heart sounds. Exam reveals no gallop and no friction rub.   No murmur heard.  Pulmonary/Chest: Breath sounds normal.   Abdominal: There is no tenderness.   Lymphadenopathy:     She has no cervical adenopathy.   Neurological: She is alert and oriented to person, place, and time.   Skin: No rash noted.   Psychiatric: She has a normal mood and affect. Her behavior is normal.         CRANIAL NERVES     CN III, IV, VI   Pupils are equal, round, and reactive to light.       Significant Labs:   Blood Culture: No results for input(s): LABBLOO in the last 48 hours.  CBC:   Recent Labs   Lab 01/14/20  1019   WBC 5.96   HGB 10.7*   HCT 34.6*        CMP:   Recent Labs   Lab 01/14/20  1019      K 3.2*      CO2 27   GLU 97   BUN 10   CREATININE 1.2   CALCIUM 9.6   PROT 7.4   ALBUMIN 3.5   BILITOT 0.5   ALKPHOS 100   AST 13   ALT 6*   ANIONGAP 13   EGFRNONAA 47*     Cardiac Markers: No results for input(s): CKMB, MYOGLOBIN, BNP, TROPISTAT in the last 48 hours.  Lactic Acid:   Recent Labs   Lab 01/14/20  1019   LACTATE 1.2     TSH:   Recent Labs   Lab 11/18/19  1310   TSH 0.967     Urine Culture: No results for input(s): LABURIN in the last 48 hours.  Urine Studies: No results for input(s): COLORU, APPEARANCEUA, PHUR, SPECGRAV, PROTEINUA, GLUCUA, KETONESU, BILIRUBINUA, OCCULTUA, NITRITE, UROBILINOGEN, LEUKOCYTESUR, RBCUA, WBCUA, BACTERIA, SQUAMEPITHEL, HYALINECASTS in the last 48 hours.    Invalid input(s): WRIGHTSUR    Significant Imaging: I have reviewed all pertinent imaging results/findings within the past 24 hours.    Assessment/Plan:     Generalized abdominal pain  Nausea vomiting and diarrhea  KUB: No dilated bowel loops to suggest obstruction.  Antiemetics  IVF  Clear liquid diet and advance as tolerated    Hypokalemia  Replace          Essential hypertension  Restart home meds Metoprolol and Losartan  Hydralazine prn      Resistant hypertension        Neuroendocrine carcinoma, unknown primary  site  Metastatic carcinoid tumor  Secondary neuroendocrine tumor of liver  Monitor symptom  Consult NET group         VTE Risk Mitigation (From admission, onward)         Ordered     enoxaparin injection 40 mg  Daily      01/14/20 1602     IP VTE HIGH RISK PATIENT  Once      01/14/20 1602                   Noemí MEME Tovar MD  Department of Hospital Medicine   [facility]

## 2020-01-15 NOTE — PLAN OF CARE
"Admitted 69 year old female patient from ED, brought in to the unit around 2225H via stretcher. Conscious , coherent to time, place date, and situation. GCS 15. With saline lock on the left forearm  gauge 22 , flushed patent, with clean and dry dressing. Patient case of abdominal pain, with 3 times diarrhea at home and 2 x in ED , and uncontrolled hypertension. Patient has subjective complaint of generalized abdominal pain 10/10. Afebrile.KINSEY Lucas  informed regarding this admission. Morphine 2 mg IV given, noted full relief.Telemetry Normal Sinus rhythm (70-80's). Oxygen saturation 98% on room air. Advised patient to call for any assistance. Safety fall precaution measures noted. Bed alarm On. Contact precaution initiated, will collect stool for C-difficile. Call bell with in reach. Will continue to monitor patient.  0004H- Blood pressure on the high side, 184/78mmHg, hydralazine 10 mg IV prn given. Blood pressure went down to 135/60mmHg after 30 minutes of giving the medication. Patient complaints of legs getting shaky and her arms is getting shaky too... verbalized "My legs I can't help it, it keeps on shaking" looks like patient is having extrapyramidal symptoms. Patient said does not want to take the medication again. KINSEY Lucas updated. Will continue to monitor patient.   0100H- Patient requested for sleeping pills, KINSEY Lucas informed melatonin 6 mg po given.Will continue to monitor patient.    0554H- Patient stable VS. Bloodpressure very much improved. Afebrile. No episodes of nausea nor vomiting.Still with complaints of abdominal pain, 8/10 generalized lower abdomen, two doses of PRN morphine 2 mg IV given over the night. Telemetry no ectopy noted over the night.One loose stool over the night sent to lab. Free from fall. IV line patent.Will endorse patient to day shift Nurse.        "

## 2020-01-15 NOTE — TELEPHONE ENCOUNTER
----- Message from Davon Garcia sent at 1/15/2020  2:17 PM CST -----  Contact: Patito [phone] or [phone]  Type: Patient Call Back    Who called:Patito     What is the request in detail: The patient is requesting a call back from the staff in regards to having severe diarrhea. She stated that she asked the doctor in the hospital, but was given no advice. She stated that they are releasing her today and her diarrhea is still heavy. She would like advice on what to do.    Can the clinic reply by [facility]?no    Would the patient rather a call back or a response via [facility]? Call back     Best call back number:[phone] or [phone]

## 2020-01-15 NOTE — PLAN OF CARE
Pt lives with her son and his spouse in [place], [place].  She uses [employer] to ambulate.  She has [employer] transportation to her appts.  Pt is anxious and asking to speak with someone from Dr. Perez's team.  I tried to explain that they did see her yesterday and they are not suggesting any interventions at this time but I will page the team.  Her son will transport her home.  White board updated with CM name and contact information.  Discharge brochure provided.  Pt encouraged to call with any questions or concerns.  Cm will continue to follow pt through transitions of care and assist with any discharge needs.    I called Dr. Kiser's cell and left a message that pt is requesting to see a resident today.       01/15/20 0903   Discharge Assessment   Assessment Type Discharge Planning Assessment   Confirmed/corrected address and phone number on facesheet? Yes   Assessment information obtained from? Patient   Communicated expected length of stay with patient/caregiver yes   Prior to hospitilization cognitive status: Alert/Oriented   Prior to hospitalization functional status: Assistive Equipment   Current cognitive status: Alert/Oriented   Current Functional Status: Assistive Equipment   Facility Arrived From: home   Lives With child(katherine), adult   Able to Return to Prior Arrangements yes   Is patient able to care for self after discharge? Yes   Who are your caregiver(s) and their phone number(s)? Dallas Domingo   Patient's perception of discharge disposition home or selfcare   Readmission Within the Last 30 Days no previous admission in last 30 days   Patient currently being followed by outpatient case management? No   Patient currently receives any other outside agency services? No   Equipment Currently Used at Home walker, rolling   Do you have any problems affording any of your prescribed medications? No   Is the patient taking medications as prescribed? yes   Does the patient have transportation home? Yes    Transportation Anticipated family or friend will provide   Discharge Plan A Home   Discharge Plan B Home with family   DME Needed Upon Discharge  none   Patient/Family in Agreement with Plan yes     Андрей Mensah RN,   [phone]

## 2020-01-16 ENCOUNTER — TELEPHONE (OUTPATIENT)
Dept: NEUROLOGY | Facility: HOSPITAL | Age: 70
End: 2020-01-16

## 2020-01-16 ENCOUNTER — NURSE TRIAGE (OUTPATIENT)
Dept: ADMINISTRATIVE | Facility: CLINIC | Age: 70
End: 2020-01-16

## 2020-01-16 ENCOUNTER — TELEPHONE (OUTPATIENT)
Dept: FAMILY MEDICINE | Facility: CLINIC | Age: 70
End: 2020-01-16

## 2020-01-16 ENCOUNTER — OUTPATIENT CASE MANAGEMENT (OUTPATIENT)
Dept: ADMINISTRATIVE | Facility: OTHER | Age: 70
End: 2020-01-16

## 2020-01-16 ENCOUNTER — HOSPITAL ENCOUNTER (EMERGENCY)
Facility: HOSPITAL | Age: 70
Discharge: HOME | End: 2020-01-16
Attending: EMERGENCY MEDICINE
Payer: MEDICARE

## 2020-01-16 VITALS
OXYGEN SATURATION: 99 % | HEART RATE: 83 BPM | DIASTOLIC BLOOD PRESSURE: 80 MMHG | TEMPERATURE: 98 F | SYSTOLIC BLOOD PRESSURE: 184 MMHG | RESPIRATION RATE: 30 BRPM

## 2020-01-16 DIAGNOSIS — K59.1 FUNCTIONAL DIARRHEA: ICD-10-CM

## 2020-01-16 DIAGNOSIS — R10.9 ABDOMINAL PAIN: ICD-10-CM

## 2020-01-16 DIAGNOSIS — R10.84 GENERALIZED ABDOMINAL PAIN: Primary | ICD-10-CM

## 2020-01-16 DIAGNOSIS — K52.9 CHRONIC DIARRHEA: ICD-10-CM

## 2020-01-16 DIAGNOSIS — R19.7 DIARRHEA, UNSPECIFIED TYPE: Primary | ICD-10-CM

## 2020-01-16 LAB
ALBUMIN SERPL BCP-MCNC: 4.2 G/DL (ref 3.5–5.2)
ALP SERPL-CCNC: 106 U/L (ref 55–135)
ALT SERPL W/O P-5'-P-CCNC: 8 U/L (ref 10–44)
ANION GAP SERPL CALC-SCNC: 14 MMOL/L (ref 8–16)
AST SERPL-CCNC: 15 U/L (ref 10–40)
BASOPHILS # BLD AUTO: 0.01 K/UL (ref 0–0.2)
BASOPHILS NFR BLD: 0.2 % (ref 0–1.9)
BILIRUB SERPL-MCNC: 0.5 MG/DL (ref 0.1–1)
BILIRUB UR QL STRIP: NEGATIVE
BUN SERPL-MCNC: 15 MG/DL (ref 8–23)
CALCIUM SERPL-MCNC: 10.3 MG/DL (ref 8.7–10.5)
CHLORIDE SERPL-SCNC: 102 MMOL/L (ref 95–110)
CLARITY UR: CLEAR
CO2 SERPL-SCNC: 27 MMOL/L (ref 23–29)
COLOR UR: YELLOW
CREAT SERPL-MCNC: 1.1 MG/DL (ref 0.5–1.4)
DIFFERENTIAL METHOD: ABNORMAL
EOSINOPHIL # BLD AUTO: 0.1 K/UL (ref 0–0.5)
EOSINOPHIL NFR BLD: 1.2 % (ref 0–8)
ERYTHROCYTE [DISTWIDTH] IN BLOOD BY AUTOMATED COUNT: 14.5 % (ref 11.5–14.5)
EST. GFR  (AFRICAN AMERICAN): >60 ML/MIN/1.73 M^2
EST. GFR  (NON AFRICAN AMERICAN): 52 ML/MIN/1.73 M^2
GLUCOSE SERPL-MCNC: 84 MG/DL (ref 70–110)
GLUCOSE UR QL STRIP: NEGATIVE
HCT VFR BLD AUTO: 38.9 % (ref 37–48.5)
HGB BLD-MCNC: 12 G/DL (ref 12–16)
HGB UR QL STRIP: NEGATIVE
KETONES UR QL STRIP: NEGATIVE
LEUKOCYTE ESTERASE UR QL STRIP: NEGATIVE
LIPASE SERPL-CCNC: 14 U/L (ref 4–60)
LYMPHOCYTES # BLD AUTO: 1.4 K/UL (ref 1–4.8)
LYMPHOCYTES NFR BLD: 22.2 % (ref 18–48)
MCH RBC QN AUTO: 27.6 PG (ref 27–31)
MCHC RBC AUTO-ENTMCNC: 30.8 G/DL (ref 32–36)
MCV RBC AUTO: 90 FL (ref 82–98)
MONOCYTES # BLD AUTO: 0.5 K/UL (ref 0.3–1)
MONOCYTES NFR BLD: 8.4 % (ref 4–15)
NEUTROPHILS # BLD AUTO: 4.1 K/UL (ref 1.8–7.7)
NEUTROPHILS NFR BLD: 68 % (ref 38–73)
NITRITE UR QL STRIP: NEGATIVE
PH UR STRIP: 6 [PH] (ref 5–8)
PLATELET # BLD AUTO: 258 K/UL (ref 150–350)
PMV BLD AUTO: 10.4 FL (ref 9.2–12.9)
POTASSIUM SERPL-SCNC: 3.4 MMOL/L (ref 3.5–5.1)
PROT SERPL-MCNC: 8.7 G/DL (ref 6–8.4)
PROT UR QL STRIP: NEGATIVE
RBC # BLD AUTO: 4.34 M/UL (ref 4–5.4)
SODIUM SERPL-SCNC: 143 MMOL/L (ref 136–145)
SP GR UR STRIP: >=1.03 (ref 1–1.03)
URN SPEC COLLECT METH UR: ABNORMAL
UROBILINOGEN UR STRIP-ACNC: NEGATIVE EU/DL
WBC # BLD AUTO: 6.08 K/UL (ref 3.9–12.7)

## 2020-01-16 PROCEDURE — 99284 EMERGENCY DEPT VISIT MOD MDM: CPT | Mod: 25,HCNC

## 2020-01-16 PROCEDURE — 93005 ELECTROCARDIOGRAM TRACING: CPT | Mod: HCNC

## 2020-01-16 PROCEDURE — 96374 THER/PROPH/DIAG INJ IV PUSH: CPT | Mod: HCNC

## 2020-01-16 PROCEDURE — 96375 TX/PRO/DX INJ NEW DRUG ADDON: CPT | Mod: 59,HCNC

## 2020-01-16 PROCEDURE — 81003 URINALYSIS AUTO W/O SCOPE: CPT | Mod: HCNC

## 2020-01-16 PROCEDURE — 63600175 PHARM REV CODE 636 W HCPCS: Mod: HCNC | Performed by: EMERGENCY MEDICINE

## 2020-01-16 PROCEDURE — 83690 ASSAY OF LIPASE: CPT | Mod: HCNC

## 2020-01-16 PROCEDURE — 80053 COMPREHEN METABOLIC PANEL: CPT | Mod: HCNC

## 2020-01-16 PROCEDURE — 93010 ELECTROCARDIOGRAM REPORT: CPT | Mod: HCNC,,, | Performed by: INTERNAL MEDICINE

## 2020-01-16 PROCEDURE — 85025 COMPLETE CBC W/AUTO DIFF WBC: CPT | Mod: HCNC

## 2020-01-16 PROCEDURE — 93010 EKG 12-LEAD: ICD-10-PCS | Mod: HCNC,,, | Performed by: INTERNAL MEDICINE

## 2020-01-16 RX ORDER — LOPERAMIDE HYDROCHLORIDE 2 MG/1
2 CAPSULE ORAL 4 TIMES DAILY
Qty: 60 CAPSULE | Refills: 3 | Status: SHIPPED | OUTPATIENT
Start: 2020-01-16 | End: 2020-02-15

## 2020-01-16 RX ORDER — ONDANSETRON 2 MG/ML
4 INJECTION INTRAMUSCULAR; INTRAVENOUS
Status: COMPLETED | OUTPATIENT
Start: 2020-01-16 | End: 2020-01-16

## 2020-01-16 RX ORDER — CHOLESTYRAMINE 4 G/9G
4 POWDER, FOR SUSPENSION ORAL
Qty: 270 PACKET | Refills: 3 | Status: CANCELLED | OUTPATIENT
Start: 2020-01-16 | End: 2021-01-15

## 2020-01-16 RX ORDER — DIPHENOXYLATE HYDROCHLORIDE AND ATROPINE SULFATE 2.5; .025 MG/1; MG/1
1 TABLET ORAL 4 TIMES DAILY PRN
Qty: 30 TABLET | Refills: 0 | Status: SHIPPED | OUTPATIENT
Start: 2020-01-16 | End: 2020-03-10 | Stop reason: SDUPTHER

## 2020-01-16 RX ORDER — MORPHINE SULFATE 2 MG/ML
6 INJECTION, SOLUTION INTRAMUSCULAR; INTRAVENOUS
Status: COMPLETED | OUTPATIENT
Start: 2020-01-16 | End: 2020-01-16

## 2020-01-16 RX ADMIN — MORPHINE SULFATE 6 MG: 2 INJECTION, SOLUTION INTRAMUSCULAR; INTRAVENOUS at 06:01

## 2020-01-16 RX ADMIN — ONDANSETRON 4 MG: 2 INJECTION INTRAMUSCULAR; INTRAVENOUS at 06:01

## 2020-01-16 NOTE — ED NOTES
"Pt states that she does not want to take Morphine because it gives her a "terrible" headache.  Pt states that dilaudid is what she prefers.  MD notified, waiting to talk to admit team to clarify orders.  Orders to follow.  "

## 2020-01-16 NOTE — TELEPHONE ENCOUNTER
Spoke with pt and told her that the 2 scripts were called in to her pharmacy. I told her Dr. Perry said if she feels bad to go to er and they would evaluate her and if there was a reason to admit then she would be admitted. Pt states she is already at the ER

## 2020-01-16 NOTE — TELEPHONE ENCOUNTER
Spoke with patient about diarrhea and states it may be done to new medication for the cancer in her stomach. The doctor who prescribed the medicine told the patient to continue the meds. But patient is has bad diarrhea and weakness with nausea . Patient is unsure what to do next. Sit a follow up hospital appointment 1/28/2020 . Patient was advise if she is feeling to bad go back to ED or UC . Patient refused NP visit as well. Please address

## 2020-01-16 NOTE — TELEPHONE ENCOUNTER
Attempt to call patient with the below information , Left message for patient to call office back .

## 2020-01-16 NOTE — TELEPHONE ENCOUNTER
Spoke to pt and she is still emotional and having diarrhea. Told her to continue the lomotil and the imodium. She says that she is taking the medication around the clock as directed. Told pt that I will tell Dr. Perry and call her back with his recommendation.  Pt verbalized understanding

## 2020-01-16 NOTE — TELEPHONE ENCOUNTER
Spoke with pt and I told her I talk with Dr. Perry and has told me to call in 2 script for diarrhea. She states she is in pain and has constant diarrhea and she wants to be hospitalized for a couple of days to get the diarrhea stopped. I told her that I would let Dr. Perry know what she said. Pt verbalized understanding

## 2020-01-16 NOTE — TELEPHONE ENCOUNTER
Called pt and relayed her symptoms to Dr. Perry and he said for her to go to ER which she did and now she is admitted to the hospital

## 2020-01-16 NOTE — TELEPHONE ENCOUNTER
----- Message from Ramona Hdz LPN sent at 1/16/2020 10:24 AM CST -----  Contact: Padmini Clements RN      ----- Message -----  From: Padmini Clements RN  Sent: 1/16/2020  10:15 AM CST  To: Silas Mott Staff    Lai this is Padmini with [facility] Case [facility]. Pt reports that she is still having diarrhea. Pt reports that she is still diarrhea. Pt reports that she is taking immodium from OTC and prescribed. Pt reports that nothing is working for the diarrhea. Pt reports that her skin is irritated. Pt also have concerns about her oxycontin not lasting until the refill date.    Please advise  Thank you

## 2020-01-16 NOTE — NURSING
Spoke with transfer center regarding patient's transportation scheduled for 1830. Transfer center states that they are in route to  patient.

## 2020-01-16 NOTE — ED TRIAGE NOTES
Pt arrived to the ED with complaints of epigastric pain and diarrhea for the past couple of weeks.  Pt denies any other symptoms at this time.  Pt states she has been taking oxycodone with no relief . Pt has hx of neuroendocrine tumors

## 2020-01-16 NOTE — TELEPHONE ENCOUNTER
----- Message from Tamara Dhillon sent at 1/16/2020  9:32 AM CST -----  Contact: self [phone]  BRIAN - Patient is calling because she says she is still having diarrhea and she not feeling good. Please call

## 2020-01-16 NOTE — PROGRESS NOTES
Outpatient Care Management  Plan of Care Follow Up Visit    Patient: Patito Domingo  MRN: [id number]  Date of Service: 01/16/2020  Completed by: Padmini Clements RN  Referral Date: 11/13/2019  Program: Case Management (High Risk)    Reason for Visit   Patient presents with    Update Plan Of Care       Brief Summary: Received a call from the pt. Pt reports that she went to the emergency room on yesterday due to diarrhea. Pt reports that she is having frequent diarrhea which is causing skin breakage. Pt reports that she is taking the immodium from over the counter and prescribed. Pt reports that she is not getting any relief from the diarrhea. Pt reports that she contacted the pain management doctor about the diarrhea. Pt also reports that she will be short with the oxycontin. Pt reports that she had to take more often due to the stomach pain. Pt reports that her pain management doctor is at [facility]. Pt requested for this CM to contact the pain management doctor. Educated pt the this CM can contact her PCP or other [facility] doctors. Pt reports that her daughter is a pharmacist and can't see why the doctor would not give her a few more oxycontin pills until she can get the refill. Pt reports that she called the PCP from the hospital on yesterday. Message sent to pt's PCP to notify of c/o diarrhea. Educated pt on the importance of keeping the skin to the buttocks clean and dry to prevent further irritation and skin breakage.       Patient Summary     Involvement of Care:  Do I have permission to speak with other family members about your care?       Patient Reported Labs & Vitals:  1.  Any Patient Reported Labs & Vitals?     2.  Patient Reported Blood Pressure:     3.  Patient Reported Pulse:     4.  Patient Reported Weight (Kg):     5.  Patient Reported Blood Glucose (mg/dl):       Medical History:  Reviewed medical history with patient and/or caregiver    Social History:  Reviewed social history with patient  and/or caregiver    Clinical Assessment     Reviewed and provided basic information on available community resources for mental health, transportation, wellness resources, and palliative care programs with patient and/or caregiver.    Complex Care Plan     Care plan was discussed and completed today with input from patient and/or caregiver.    Goals      Patient/caregiver will accept life style changes to manage and improve coping due to Metastatic Carcinoid tumor prior to discharge from OPCM. - Priority: High      Overall Time to Completion  2 months from 11/20/2019     OPCM Identified Patient Barriers:  Depression: LSMW; Referred        OPCM Identified Disease Education Barriers:  Hypertension:  Care plan created  Falls: Care plan created          Short Term Goals  Patient/caregiver will verbalize 2 risk factors of Ineffective coping within 1 month.  Interventions   · Assess patient's ability to perform ADLs.  · Assess type of communication barriers.  · Collaborate with Physician as appropriate to meet patient needs.  · Provide contact information for 24 hour Crisis Line number- COPE LINE.  · Recognize and provide educational material (REMEDIOS).  · Refer to Outpatient Case Management Social Worker.     Status  · Met12/4/19, 12/12/19      Patient/caregiver will verbalize 2 strategies to effectivecoping strategies within 1 month.  Interventions   · Assess type of communication barriers.  · Collaborate with Physician as appropriate to meet patient needs.  · Facilitate referral for counseling.  · Provide contact information for 24 hour Crisis Line number- COPE LINE.  · Recognize and provide educational material (REMEDIOS).  · Refer to Outpatient Case Management Social Worker.     Status  · Partially met           Clinical Reference Documents Added to Patient Instructions       Document    CANCER, RESOURCES FOR PEOPLE WITH (ENGLISH)    FALLS IN THE HOME, PREVENTING (ENGLISH)    FALLS, PREVENTING, MAKE YOUR HEALTH A  PRIORITY (ENGLISH)    ONCOLOGY: COMMUNICATING WITH OTHERS  (ENGLISH)    WEAKNESS (UNCERTAIN CAUSE) (ENGLISH)             Patient/caregiver will accept life style changes to manage and improve Depression prior to discharge from OPCM. - Priority: High      Overall Time to Completion  2 weeks from 01/16/2020     OPCM Identified Patient Needs:    Depression: LSMW; Referred        OPCM Identified Disease Education Barriers:  Hypertension:  Care plan created  Falls: Care plan created       Short Term Goals  Patient/caregiver will verbalize 2 risk factors of Diarrhea within 2 weeks.  Interventions   · Assess patient's ability to perform ADLs.  · Collaborate with Physician as appropriate to meet patient needs.1/16/2020  · Recognize and provide educational material (KRAMES).     Status  · Partially met      Patient/caregiver will verbalize 2 ways of preventing complications due to disease process within 2 weeks.  Interventions   · Assess patient's ability to perform ADLs.  · Collaborate with Physician as appropriate to meet patient needs.  · Encourage Dietary Compliance.  · Encourage Medication Compliance.  · Recognize and provide educational material (KRAMES).     Status  · Partially met             Patient/caregiver will accept life style changes to manage and improve risk of Falls prior to discharge from OPCM. - Priority: High      Overall Time to Completion  2 months from 11/20/2019     OPCM Identified Patient Barriers:    Depression: LSMW; Referred        OPCM Identified Disease Education Barriers:  Hypertension:  Care plan created  Falls: Care plan created                     Short Term Goals  Patient/caregiver will verbalize 2 risk factors of Falls within 1 month.  Interventions   · Assess patient's ability to perform ADLs.  · Collaborate with Physician as appropriate to meet patient needs.  · Discuss alternate Levels of Care with patient/caregiver.  · Encourage Exercise.  · Facilitate Home Health  Services.  · Facilitate referral to Outpatient Rehab.  · Recognize and provide educational material (REMEDIOS).  · Refer to Outpatient Case Management Social Worker.     Status  · Partially met      Patient/caregiver will verbalize 2 strategies to rest breaks and manage fatigue within 1 month.  Interventions   · Assess patient's ability to perform ADLs.  · Collaborate with Physician as appropriate to meet patient needs.  · Recognize and provide educational material (REMEDIOS).     Status  · Partially met           Clinical Reference Documents Added to Patient Instructions       Document    CANCER, RESOURCES FOR PEOPLE WITH (ENGLISH)    FALLS IN THE HOME, PREVENTING (ENGLISH)    FALLS, PREVENTING, MAKE YOUR HEALTH A PRIORITY (ENGLISH)    ONCOLOGY: COMMUNICATING WITH OTHERS  (ENGLISH)    WEAKNESS (UNCERTAIN CAUSE) (ENGLISH)                  Patient Instructions     Instructions were provided via the [facility] patient resources and are available for the patient to view on the patient portal.    Next Steps: Educate pt on ways to prevent dehydration.                          No follow-ups on file.      Todays OPCM Self-Management Care Plan was developed with the patients/caregivers input and was based on identified barriers from todays assessment.  Goals were written today with the patient/caregiver and the patient has agreed to work towards these goals to improve his/her overall well-being. Patient verbalized understanding of the care plan, goals, and all of today's instructions. Encouraged patient/caregiver to communicate with his/her physician and health care team about health conditions and the treatment plan.  Provided my contact information today and encouraged patient/caregiver to call me with any questions as needed.

## 2020-01-16 NOTE — ED PROVIDER NOTES
Encounter Date: 2020    SCRIBE #1 NOTE: I, Mariela Snider, am scribing for, and in the presence of,  Kristen Kiser MD. I have scribed the entire note.       History     Chief Complaint   Patient presents with    Abdominal Pain     abd pain, intermittent n/v/d, fluctuating bp. hx of carcinoid tumor .      Time seen by provider: 3:46 PM    This is a 69 y.o. 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 Her associated symptoms include diarrhea x2 weeks. The patient denies fever or any other concerning symptoms. She has taken oxycodone without relief. Dr. Herbert is her doctor.     The history is provided by the patient.     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)      Past Surgical History:   Procedure Laterality Date    CATARACT EXTRACTION Left 10/2017     SECTION      CHOLECYSTECTOMY      COLON SURGERY      cystoscope      CYSTOSCOPY W/ RETROGRADES Right 10/10/2019    Procedure: CYSTOSCOPY, WITH RETROGRADE PYELOGRAM;  Surgeon: Gen Isbell MD;  Location[facility];  Service: Urology;  Laterality: Right;    EYE SURGERY      HYSTERECTOMY  1996    LITHOTRIPSY      LIVER BIOPSY      carcinoid    URETEROSCOPY Right 10/10/2019    Procedure: URETEROSCOPY;   Surgeon: Gen Isbell MD;  [facility];  Service: Urology;  Laterality: Right;    UTERINE FIBROID SURGERY       Family History   Problem Relation Age of Onset    Cancer Mother         unknown    Alzheimer's disease Father     Stroke Sister     No Known Problems Son     No Known Problems Son     No Known Problems Son     No Known Problems Son     Kidney disease Neg Hx      Social History     Tobacco Use    Smoking status: Never Smoker    Smokeless tobacco: Never Used   Substance Use Topics    Alcohol use: No     Alcohol/week: 0.0 standard drinks     Frequency: Never     Binge frequency: Never    Drug use: No     Review of Systems    Physical Exam     Initial Vitals [01/16/20 1511]   BP Pulse Resp Temp SpO2   (!) 162/92 80 16 97.6 °F (36.4 °C) 99 %      MAP       --         Physical Exam    ED Course   Procedures  Labs Reviewed   CBC W/ AUTO DIFFERENTIAL - Abnormal; Notable for the following components:       Result Value    Mean Corpuscular Hemoglobin Conc 30.8 (*)     All other components within normal limits   COMPREHENSIVE METABOLIC PANEL - Abnormal; Notable for the following components:    Potassium 3.4 (*)     Total Protein 8.7 (*)     ALT 8 (*)     eGFR if non  52 (*)     All other components within normal limits   URINALYSIS, REFLEX TO URINE CULTURE - Abnormal; Notable for the following components:    Specific Gravity, UA >=1.030 (*)     All other components within normal limits    Narrative:     Preferred Collection Type->Urine, Clean Catch   LIPASE     EKG Readings: (Independently Interpreted)   15:29 - 74 bpm with NSR and no ST/T wave changes.                              ED Course as of Jan 16 1909   Thu Jan 16, 2020   1618 Discussed with [facility] General Surgery Resident. Pt is known frequent flyer with chronic abdominal pain. Resident does not recommend imaging at this time. Resident will confer with Dr Herbert and call back.    [SG]   1905 Case discussed with  general surgery resident, Dr. Kiser.  He recommends discharging the patient since the labs are all normal with a prescription for Imodium 2 mg to take q.6 hours dispense 60 with 3 refills.  The patient is feeling more comfortable after morphine 6 mg and she will be discharged at this time.    [ST]      ED Course User Index  [SG] Mariela BARRAZA Snider  [ST] Kristen Kiser MD                Clinical Impression:       ICD-10-CM ICD-9-CM   1. Generalized abdominal pain R10.84 789.07   2. Abdominal pain R10.9 789.00   3. Functional diarrhea K59.1 564.5              I, Kristen Kiser, personally performed the services described in this documentation. All medical record entries made by the scribe were at my direction and in my presence.  I have reviewed the chart and agree that the record reflects my personal performance and is accurate and complete. Kristen Kiser M.D. 7:10 PM01/16/2020               Kristen Kiser MD  01/16/20 1910

## 2020-01-16 NOTE — TELEPHONE ENCOUNTER
Received message - my recommendations would be as follows:    1) Patient would need to contact her Pain Management clinic in regards to Percocet (with whom she is managed for her chronic pain)    2) for chronic diarrhea, she may need specialty/GI follow-up ([facility] -- this would be best discussed at a hospital follow-up visit

## 2020-01-16 NOTE — ED NOTES
Pt lying in bed resting comfortably, in no acute distress.  Pt provided blankets for comfort and aware of plan of care to await results from blood work and urinalysis.  Bed locked and in lowest position, side rails up x 2, call light within reach, VSS, will continue to monitor

## 2020-01-16 NOTE — ED NOTES
APPEARANCE: Alert, oriented and in no acute distress.  CARDIAC: Normal rate and rhythm, no murmur heard.   PERIPHERAL VASCULAR: peripheral pulses present. Normal cap refill. No edema. Warm to touch.    RESPIRATORY:Normal rate and effort, breath sounds clear bilaterally throughout chest. Respirations are equal and unlabored no obvious signs of distress.  MUSC: No bony tenderness or soft tissue tenderness. No obvious deformity.  SKIN: Skin is warm and dry, normal skin turgor, mucous membranes moist.  NEURO: 5/5 strength major flexors/extensors bilaterally. Sensory intact to light touch bilaterally. Kylie coma scale: eyes open spontaneously-4, oriented & converses-5, obeys commands-6. No neurological abnormalities.   MENTAL STATUS: awake, alert and aware of environment.

## 2020-01-16 NOTE — PATIENT INSTRUCTIONS
Treating Diarrhea    Diarrhea happens when you have loose, watery, or frequent bowel movements. It is a common problem with many causes. Most cases of diarrhea clear up on their own. But certain cases may need treatment. Be sure to see your healthcare provider if your symptoms do not improve within a few days.  Getting relief  Treatment of diarrhea depends on its cause. Diarrhea caused by bacterial or parasite infection is often treated with antibiotics. Diarrhea caused by other factors, such as a stomach virus, often improves with simple home treatment. The tips below may also help relieve your symptoms.  · Drink plenty of fluids. This helps prevent too much fluid loss (dehydration). Water, clear soups, and electrolyte solutions are good choices. Avoid alcohol, coffee, tea, and milk. These can irritate your intestines and make symptoms worse.  · Suck on ice chips if drinking makes you queasy.  · Return to your normal diet slowly. You may want to eat bland foods at first, such as rice and toast. Also, you may need to avoid certain foods for a while, such as dairy products. These can make symptoms worse. Ask your healthcare provider if there are any other foods you should avoid.  · If you were prescribed antibiotics, take them as directed.  · Do not take anti-diarrhea medicines without asking your healthcare provider first.  Call your healthcare provider   Call your healthcare provider if you have any of the following:   · A fever of 100.4°F (38.0°C) or higher, or as directed by your healthcare provider  · Severe pain  · Worsening diarrhea or diarrhea for more than 2 days  · Bloody vomit or stool  · Signs of dehydration (dizziness, dry mouth and tongue, rapid pulse, dark urine)  Date Last Reviewed: 7/1/2016 © 2000-2017 [employer]. [address] [place] [address], [place] [address]. All rights reserved. This information is not intended as a substitute for professional medical care. Always follow your  healthcare professional's instructions.

## 2020-01-16 NOTE — ED NOTES
[facility] medicine at the bedside to talk to pt.  Pt stated concern because she was prescribed antibiotics and has diarrhea.

## 2020-01-16 NOTE — TELEPHONE ENCOUNTER
Called in medication Creon 24,000 76,000 120,000 take 1 cap 3x daily with meals. And Questran 4mg packs take 1 pack 3x daily with meals. These medications were called in to [facility]   Statement Selected

## 2020-01-16 NOTE — TELEPHONE ENCOUNTER
Will send her a refill of anti-diarrheal medication (LOMOTIL) that was last filled a few months ago for very severe episodes only. Sent medication refill to patient's preferred pharmacy on file. We will address other issues at hosp f/u visit - agree if symptoms progress may need ED evaluation

## 2020-01-16 NOTE — TELEPHONE ENCOUNTER
----- Message from Luigi Britt MA sent at 1/16/2020 12:47 PM CST -----  Contact: Self      ----- Message -----  From: Leah Porras  Sent: 1/16/2020  12:42 PM CST  To: [facility] Staff    Type: Patient Call Back    Who called:Patito    What is the request in detail:Patient returned call to office. Please call    [facility]    Would the patient rather a call back or a response via [facility]? Call    Best call back number:[phone]

## 2020-01-16 NOTE — TELEPHONE ENCOUNTER
Reason for Disposition   Systolic BP >= 160 OR Diastolic >= 100, and any cardiac or neurologic symptoms (e.g., chest pain, difficulty breathing, unsteady gait, blurred vision)    Additional Information   Negative: Sounds like a life-threatening emergency to the triager   Negative: Pregnant > 20 weeks and new hand or face swelling   Negative: Pregnant > 20 weeks and BP > 140/90    Protocols used: HIGH BLOOD PRESSURE-A-OH  Pt blood pressure is 180/70. Pt is c/o chest pain, diarrhea, headache, and the rt side of her face felling hot. Care advice recommend pt go to Er.Pt stated she was waiting on Dr Perry office to call her back.  Pt stated she don't have anyone home with her or who can bring her to the er. Pt stated she will call 911 to send an ambulance out to get her.

## 2020-01-17 ENCOUNTER — PES CALL (OUTPATIENT)
Dept: ADMINISTRATIVE | Facility: CLINIC | Age: 70
End: 2020-01-17

## 2020-01-17 ENCOUNTER — TELEPHONE (OUTPATIENT)
Dept: NEUROLOGY | Facility: HOSPITAL | Age: 70
End: 2020-01-17

## 2020-01-17 NOTE — CONSULTS
Neuroendocrine and General Surgery  CONSULT / H&P NOTE  Date: 2020    CC: diarrhea    HPI:   Patito Domingo is a 69 y.o. female with hx of carcinoid syndrome followed in neuroendocrine clinic s/p SBR and cholecystectomy. Patient is well known to our service and has long-standing hx of abdominal pain and diarrhea. Last seen in our clinic in October but has rescheduled her follow up appointments many times. Admitted to observation on  for diarrhea and found to have labs and abdominal imaging within normal limits at that time. Pt was discharged 1/15.     Patient now re-presents with reported continued diarrhea despite taking immodium 4x per day. Patient is unable to state the dose and she is taking. Was prescribed cipro/flagyl at discharge as well and reports she is taking it.    Reports diffuse abdominal pain nonspecific and that nothing makes it better or worse.    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)          PSH:   Past Surgical History:   Procedure Laterality Date    CATARACT EXTRACTION Left 10/2017     SECTION      CHOLECYSTECTOMY      COLON SURGERY      cystoscope      CYSTOSCOPY W/ RETROGRADES Right 10/10/2019    Procedure: CYSTOSCOPY, WITH RETROGRADE PYELOGRAM;  Surgeon: Gen Isbell MD;  Location: [facility] OR;  Service: Urology;  Laterality: Right;    EYE SURGERY      HYSTERECTOMY  1996    LITHOTRIPSY      LIVER BIOPSY      carcinoid    URETEROSCOPY Right 10/10/2019    Procedure: URETEROSCOPY;  Surgeon: Gen Isbell MD;  Location: [facility] OR;  Service: Urology;  Laterality: Right;    UTERINE FIBROID SURGERY           FamHx:   Family History   Problem Relation Age of Onset    Cancer Mother          unknown    Alzheimer's disease Father     Stroke Sister     No Known Problems Son     No Known Problems Son     No Known Problems Son     No Known Problems Son     Kidney disease Neg Hx          SocHx:  No tobacco or alcohol abuse per chart review     Medications:  Current Facility-Administered Medications on File Prior to Encounter   Medication Dose Route Frequency Provider Last Rate Last Dose    [DISCONTINUED] enoxaparin injection 40 mg  40 mg Subcutaneous Daily Noemí Tovar MD   40 mg at 01/14/20 1758    [DISCONTINUED] hydrALAZINE injection 10 mg  10 mg Intravenous Q8H PRN Noemí Tovar MD        [DISCONTINUED] HYDROcodone-acetaminophen  mg per tablet 1 tablet  1 tablet Oral Q4H PRN Hanna Nava MD   1 tablet at 01/15/20 1604    [DISCONTINUED] losartan tablet 50 mg  50 mg Oral Daily Noemí Tovar MD   50 mg at 01/15/20 0855    [DISCONTINUED] magnesium oxide tablet 400 mg  400 mg Oral BID Noemí Tovar MD   400 mg at 01/15/20 1008    [DISCONTINUED] metoprolol tartrate (LOPRESSOR) tablet 50 mg  50 mg Oral BID Noemí Tovar MD   50 mg at 01/15/20 0855    [DISCONTINUED] ondansetron disintegrating tablet 8 mg  8 mg Oral Q8H PRN Noemí Tovar MD   8 mg at 01/14/20 1922    [DISCONTINUED] oxyCODONE 12 hr tablet 10 mg  10 mg Oral Q12H Noemí Tovar MD   10 mg at 01/15/20 1008    [DISCONTINUED] sodium chloride 0.9% flush 10 mL  10 mL Intravenous PRN Noemí Tovar MD        [DISCONTINUED] tamsulosin 24 hr capsule 0.4 mg  0.4 mg Oral QHS Noemí Tovar MD   0.4 mg at 01/14/20 2103     Current Outpatient Medications on File Prior to Encounter   Medication Sig Dispense Refill    bumetanide (BUMEX) 0.5 MG Tab Take 1 tablet (0.5 mg total) by mouth daily as needed. 30 tablet 2    ciprofloxacin HCl (CIPRO) 500 MG tablet Take 1 tablet (500 mg total) by mouth 2 (two) times daily. 20 tablet 0     cloNIDine (CATAPRES) 0.1 MG tablet TAKE ONE TABLET BY MOUTH THREE TIMES DAILY AS NEEDED FOR signs of withdrawal  1    cyanocobalamin (VITAMIN B-12) 1000 MCG tablet Take 1 tablet (1,000 mcg total) by mouth once daily. 30 tablet 5    diclofenac sodium (VOLTAREN) 1 % Gel Apply the gel (2 g) to the affected area 4 times daily. Do not apply more than 8 g daily to any one affected joint of the upper extremities. 100 g 1    diphenoxylate-atropine 2.5-0.025 mg (LOMOTIL) 2.5-0.025 mg per tablet Take 1 tablet by mouth 4 (four) times daily as needed for Diarrhea (for episodic diarrhea). 30 tablet 0    ferrous sulfate (FEOSOL) 325 mg (65 mg iron) Tab tablet TAKE ONE TABLET BY MOUTH THREE TIMES PER WEEK 12 tablet 2    hydrocortisone valerate (WEST-KAREN) 0.2 % ointment Apply topically 2 (two) times daily. for 7 days 60 g 0    loperamide (IMODIUM) 2 mg capsule Take 2 capsules (4 mg total) by mouth 3 (three) times daily as needed. 10 capsule 0    losartan (COZAAR) 50 MG tablet Take 1 tablet (50 mg total) by mouth once daily. 30 tablet 5    metoprolol tartrate (LOPRESSOR) 50 MG tablet TAKE ONE TABLET BY MOUTH TWICE DAILY 180 tablet 0    metroNIDAZOLE (FLAGYL) 500 MG tablet Take 1 tablet (500 mg total) by mouth 3 (three) times daily. 30 tablet 0    ondansetron (ZOFRAN) 4 MG tablet Take 1 tablet (4 mg total) by mouth every 8 (eight) hours as needed for Nausea. 30 tablet 0    oxyCODONE (ROXICODONE) 15 MG Tab TK 1 T PO  Q 4 H prn  0    promethazine (PHENERGAN) 12.5 MG Tab Take 1 tablet (12.5 mg total) by mouth every 8 (eight) hours as needed (nausea). 6 tablet 0    tamsulosin (FLOMAX) 0.4 mg Cap Take 1 capsule (0.4 mg total) by mouth every evening. 30 capsule 0    trolamine salicylate (ASPERCREME) 10 % cream Apply topically as needed.      [DISCONTINUED] diphenoxylate-atropine 2.5-0.025 mg (LOMOTIL) 2.5-0.025 mg per tablet Take 1 tablet by mouth 4 (four) times daily as needed for Diarrhea (for episodic diarrhea). 30  tablet 0       Allergies:   Review of patient's allergies indicates:   Allergen Reactions    Epinephrine Anaphylaxis     Can cause  a Carcinoid Crisis    Contrast media Hives, Itching and Swelling    Ibuprofen Hives, Itching and Swelling    Iodinated contrast media     Sulfa (sulfonamide antibiotics) Hives, Itching and Swelling       ROS: 10 points reviewed, negative except for that stated in HPI.      Physical Exam:    VITAL SIGNS: 24 HR MIN & MAX LAST    Temp  Min: 97.6 °F (36.4 °C)  Max: 97.6 °F (36.4 °C)  97.6 °F (36.4 °C)        BP  Min: 162/92  Max: 162/92  (!) 162/92     Pulse  Min: 80  Max: 80  80     Resp  Min: 16  Max: 16  16    SpO2  Min: 99 %  Max: 99 %  99 %      HT:    WT:    BMI:         Gen: No acute distress, Resting comfortably in bed  Neuro: Alert and oriented to person, place, and time  Psych: Appropriate mood and affect  HEENT: NCAT, EOMI   CV: RRR  Resp: no increased WOB   Abd: Soft, nondistended. Nontender when distracted. RUQ tenderness most notable to patient  : No De La Cruz  MSK: Moves all extremities, 2+ DP pulses bilaterally  Skin: Warm, dry     Results  Recent Labs   Lab 01/14/20  1019 01/15/20  0312 01/16/20  1634   WBC 5.96 6.94 6.08   HGB 10.7* 11.2* 12.0   HCT 34.6* 36.2* 38.9    221 258    141  --    CO2 27 23  --    BUN 10 10  --    CREATININE 1.2 1.0  --    CALCIUM 9.6 9.2  --    MG  --  1.5*  --    PHOS  --  2.3*  --    ALKPHOS 100  --   --    LIPASE 7  --   --            A/P:   69 y.o. female with hx of carcinoid syndrome s/p leslie and SBR presenting with continued diarrhea and abdominal pain.  Patient is well known to our service. Last seen yesterday with recommendation to start immodium. Patient needs to give the medications more time to take effect and continue to take them as scheduled.    -no indication for surgical intervention   -recommend 2mg Immodium every 6 hours  -scheduled tylenol and ibuprofen for pain with prescribed opioids in between   -please  have patient come to her follow up appointment where we can see her regularly and evaluate her response to different antidiarrhea medications    -case discussed with Dr. Chris Kiser MD  [facility] General Surgery

## 2020-01-17 NOTE — ED NOTES
Pt advised that she would like to have the morphine previously refused. MD notified, orders to follow

## 2020-01-21 ENCOUNTER — TELEPHONE (OUTPATIENT)
Dept: NEUROLOGY | Facility: HOSPITAL | Age: 70
End: 2020-01-21

## 2020-01-21 NOTE — TELEPHONE ENCOUNTER
Called pt and lvm.  ask her to return this call that we need to set up an appointment with her and her family.

## 2020-01-22 ENCOUNTER — TELEPHONE (OUTPATIENT)
Dept: NEUROLOGY | Facility: HOSPITAL | Age: 70
End: 2020-01-22

## 2020-01-22 NOTE — TELEPHONE ENCOUNTER
Called pt and lvm that if her family needs to be with her tomorrow at her appt. Said that if her family can not be with her we need to cancel appt and reschedule when her family can be with her. Ask for a return call

## 2020-01-22 NOTE — TELEPHONE ENCOUNTER
Spoke with pt she is coming for her appt tomorrow but she does not know if family can come with her. She states she is still having diarrhea. Gave her the recommendations of the Tumor board. Pt verbalized understanding

## 2020-01-23 ENCOUNTER — TELEPHONE (OUTPATIENT)
Dept: NEUROLOGY | Facility: HOSPITAL | Age: 70
End: 2020-01-23

## 2020-01-23 ENCOUNTER — OFFICE VISIT (OUTPATIENT)
Dept: NEUROLOGY | Facility: HOSPITAL | Age: 70
End: 2020-01-23
Attending: SURGERY
Payer: MEDICARE

## 2020-01-23 ENCOUNTER — DOCUMENTATION ONLY (OUTPATIENT)
Dept: NEUROLOGY | Facility: HOSPITAL | Age: 70
End: 2020-01-23

## 2020-01-23 VITALS
DIASTOLIC BLOOD PRESSURE: 61 MMHG | SYSTOLIC BLOOD PRESSURE: 136 MMHG | WEIGHT: 174.19 LBS | BODY MASS INDEX: 27.99 KG/M2 | HEIGHT: 66 IN | HEART RATE: 83 BPM

## 2020-01-23 DIAGNOSIS — K52.9 CHRONIC DIARRHEA: ICD-10-CM

## 2020-01-23 DIAGNOSIS — C7A.012 MALIGNANT CARCINOID TUMOR OF ILEUM: Primary | ICD-10-CM

## 2020-01-23 DIAGNOSIS — R11.0 NAUSEA: ICD-10-CM

## 2020-01-23 DIAGNOSIS — C7B.8 SECONDARY NEUROENDOCRINE TUMOR OF LIVER: ICD-10-CM

## 2020-01-23 DIAGNOSIS — C7B.8 SECONDARY NEUROENDOCRINE TUMOR OF DISTANT LYMPH NODES: ICD-10-CM

## 2020-01-23 DIAGNOSIS — R19.7 DIARRHEA, UNSPECIFIED TYPE: ICD-10-CM

## 2020-01-23 DIAGNOSIS — E34.0 CARCINOID SYNDROME: ICD-10-CM

## 2020-01-23 PROCEDURE — 99213 OFFICE O/P EST LOW 20 MIN: CPT | Mod: HCNC | Performed by: SURGERY

## 2020-01-23 RX ORDER — DIPHENOXYLATE HYDROCHLORIDE AND ATROPINE SULFATE 2.5; .025 MG/1; MG/1
1 TABLET ORAL 4 TIMES DAILY PRN
Qty: 35 TABLET | Refills: 0 | Status: SHIPPED | OUTPATIENT
Start: 2020-01-23 | End: 2020-02-02

## 2020-01-23 RX ORDER — ONDANSETRON 4 MG/1
4 TABLET, FILM COATED ORAL EVERY 8 HOURS PRN
Qty: 40 TABLET | Refills: 2 | Status: SHIPPED | OUTPATIENT
Start: 2020-01-23 | End: 2020-07-21

## 2020-01-23 RX ORDER — FLUCONAZOLE 150 MG/1
150 TABLET ORAL DAILY
Qty: 7 TABLET | Refills: 0 | Status: SHIPPED | OUTPATIENT
Start: 2020-01-23 | End: 2020-08-19 | Stop reason: SDUPTHER

## 2020-01-23 NOTE — PATIENT INSTRUCTIONS
Your next appointment with Dr. Perry is 3/12/20 at 1020 am      At the end of February we will set up for the TACE procedure. Your will be contacted

## 2020-01-23 NOTE — TELEPHONE ENCOUNTER
Spoke with pt and reassured her that the family conference with Dr. Perry is for managing her treatment and plan of care. She said that a family is suppose to be meeting her here.

## 2020-01-23 NOTE — PROGRESS NOTES
RE: Nutrition Note    Discussed how to take metamucil. 1 packet 3 x per day to aid in bulking up stool in presence of diarrhea. Patient verbalizing understanding.

## 2020-01-24 ENCOUNTER — TELEPHONE (OUTPATIENT)
Dept: NEUROLOGY | Facility: HOSPITAL | Age: 70
End: 2020-01-24

## 2020-01-24 ENCOUNTER — TELEPHONE (OUTPATIENT)
Dept: HEMATOLOGY/ONCOLOGY | Facility: CLINIC | Age: 70
End: 2020-01-24

## 2020-01-24 NOTE — TELEPHONE ENCOUNTER
----- Message from Lupe Otto LPN sent at 1/24/2020  9:07 AM CST -----  Dr. Perez is referring this pt to an Oncologist on the [facility] to get Lanreotide 120mg im every 28 days.

## 2020-01-24 NOTE — TELEPHONE ENCOUNTER
Spoke to Maribel daughter of this pt and explained about pt getting an oncologist on the [facility]. Recommended Gonzales Bishop and let her know that the Medicaid will  the co-pay for the Lanreotide that she needs to be on. She verbalized understanding

## 2020-01-28 DIAGNOSIS — I1A.0 RESISTANT HYPERTENSION: ICD-10-CM

## 2020-01-28 NOTE — TELEPHONE ENCOUNTER
----- Message from Ernestine Torres sent at 1/28/2020 12:05 PM CST -----  Contact: Self  Type: RX Refill Request    Who Called:  Self     Have you contacted your pharmacy: no     Refill or New Rx: Refill     RX Name and Strength:metoprolol tartrate (LOPRESSOR) 50 MG tablet    Preferred Pharmacy with phone number:[facility] - [place] [address] [address] [facility] [address] [phone] (Phone)  [phone] (Fax)        Local or Mail Order: Local     Ordering Provider: Dr. Rosen     Would the patient rather a call back or a response via [facility]?  Call     Best Call Back Number:[phone]

## 2020-01-29 RX ORDER — METOPROLOL TARTRATE 50 MG/1
50 TABLET ORAL 2 TIMES DAILY
Qty: 180 TABLET | Refills: 0 | Status: SHIPPED | OUTPATIENT
Start: 2020-01-29 | End: 2020-02-05 | Stop reason: SDUPTHER

## 2020-01-30 ENCOUNTER — OUTPATIENT CASE MANAGEMENT (OUTPATIENT)
Dept: ADMINISTRATIVE | Facility: OTHER | Age: 70
End: 2020-01-30

## 2020-01-30 NOTE — PROGRESS NOTES
Outpatient Care Management  Plan of Care Follow Up Visit    Patient: Patito Domingo  MRN: [id number]  Date of Service: 01/30/2020  Completed by: Padmini Clements RN  Referral Date: 11/13/2019  Program: Case Management (High Risk)    No chief complaint on file.      Brief Summary:  Pt reports that she is not having any diarrhea at this time. Pt reports that her mood is better without having the diarrhea. Pt reports that her skin is intact at this time.  Pt reports that she is staying with her 2nd son at this time. Pt reports that she has added concerns about living arrangements and the health problems. Pt reports that she had a problem with transportation for her last MD visit. Educated pt on the benefits of talking with a counselor to express her feelings and concerns. Pt reports that she would consider scheduling with a counselor. Reviewed with pt her upcoming appts. Pt reports that she has a colonoscopy scheduled at [facility] with the first doctor that performed surgery. Pt unable to recall the date at this time.  Pt denies any recent falls.        Patient Summary     Involvement of Care:  Do I have permission to speak with other family members about your care? No      Patient Reported Labs & Vitals:  1.  Any Patient Reported Labs & Vitals?     2.  Patient Reported Blood Pressure:     3.  Patient Reported Pulse:     4.  Patient Reported Weight (Kg):     5.  Patient Reported Blood Glucose (mg/dl):       Medical History:  Reviewed medical history with patient and/or caregiver    Social History:  Reviewed social history with patient and/or caregiver    Clinical Assessment     Reviewed and provided basic information on available community resources for mental health, transportation, wellness resources, and palliative care programs with patient and/or caregiver.    Complex Care Plan     Care plan was discussed and completed today with input from patient and/or caregiver.    Goals       Patient/caregiver will accept life style changes to manage and improve coping due to Metastatic Carcinoid tumor prior to discharge from OPCM. - Priority: High      Overall Time to Completion  2 months from 11/20/2019     OPCM Identified Patient Barriers:  Depression: LSMW; Referred        OPCM Identified Disease Education Barriers:  Hypertension:  Care plan created  Falls: Care plan created          Short Term Goals  Patient/caregiver will verbalize 2 risk factors of Ineffective coping within 1 month.  Interventions   · Assess patient's ability to perform ADLs.  · Assess type of communication barriers.  · Collaborate with Physician as appropriate to meet patient needs.  · Provide contact information for 24 hour Crisis Line number- [facility] LINE.  · Recognize and provide educational material (REMEDIOS).  · Refer to Outpatient Case Management Social Worker.     Status  · Met12/4/19, 12/12/19      Patient/caregiver will verbalize 2 strategies to effectivecoping strategies within 1 month.  Interventions   · Assess type of communication barriers. 1/30/2020  · Collaborate with Physician as appropriate to meet patient needs.  · Facilitate referral for counseling.  · Provide contact information for 24 hour Crisis Line number- [facility]E LINE.1/30/2020  · Recognize and provide educational material (REMEDIOS).  · Refer to Outpatient Case Management Social Worker.  Patient/Caregiver agrees to pt will verbalize willingness to consider counseling for coping by  2 weeks.  · Patient/Caregiver agrees to OPCM follow up within 2 weeks to assess progress to goal.   ·      Status  · Partially met           Clinical Reference Documents Added to Patient Instructions       Document    CANCER, RESOURCES FOR PEOPLE WITH (ENGLISH)    FALLS IN THE HOME, PREVENTING (ENGLISH)    FALLS, PREVENTING, MAKE YOUR HEALTH A PRIORITY (ENGLISH)    ONCOLOGY: COMMUNICATING WITH OTHERS  (ENGLISH)    WEAKNESS (UNCERTAIN CAUSE) (ENGLISH)             Patient/caregiver will  accept life style changes to manage and improve Diarrhea prior to discharge from OPCM. - Priority: High      Overall Time to Completion  2 weeks from 01/16/2020     OPCM Identified Patient Needs:    Depression: LSMW; Referred        OPCM Identified Disease Education Barriers:  Hypertension:  Care plan created  Falls: Care plan created       Short Term Goals  Patient/caregiver will verbalize 2 risk factors of Diarrhea within 2 weeks.  Interventions   · Assess patient's ability to perform ADLs.1/30/2020  · Collaborate with Physician as appropriate to meet patient needs.1/16/2020  · Recognize and provide educational material (KRAMES).     Status  · Met 1/30/2020      Patient/caregiver will verbalize 2 ways of preventing complications due to disease process within 2 weeks.  Interventions   · Assess patient's ability to perform ADLs.1/30/2020  · Collaborate with Physician as appropriate to meet patient needs.  · Encourage Dietary Compliance.  · Encourage Medication Compliance.  · Recognize and provide educational material (KRAMES).     Status  · Met 1/30/2020             Patient/caregiver will accept life style changes to manage and improve risk of Falls prior to discharge from OPCM. - Priority: High      Overall Time to Completion  2 months from 11/20/2019     OPCM Identified Patient Barriers:    Depression: LSMW; Referred        OPCM Identified Disease Education Barriers:  Hypertension:  Care plan created  Falls: Care plan created                     Short Term Goals  Patient/caregiver will verbalize 2 risk factors of Falls within 1 month.  Interventions   · Assess patient's ability to perform ADLs.  · Collaborate with Physician as appropriate to meet patient needs.  · Discuss alternate Levels of Care with patient/caregiver.  · Encourage Exercise.  · Facilitate Home Health Services.  · Facilitate referral to Outpatient Rehab.  · Recognize and provide educational material (REMEDIOS).  · Refer to Outpatient Case  Management .     Status  · Partially met      Patient/caregiver will verbalize 2 strategies to rest breaks and manage fatigue within 1 month.  Interventions   · Assess patient's ability to perform ADLs.  · Collaborate with Physician as appropriate to meet patient needs.  · Recognize and provide educational material (REMEDIOS).     Status  · Partially met           Clinical Reference Documents Added to Patient Instructions       Document    CANCER, RESOURCES FOR PEOPLE WITH (ENGLISH)    FALLS IN THE HOME, PREVENTING (ENGLISH)    FALLS, PREVENTING, MAKE YOUR HEALTH A PRIORITY (ENGLISH)    ONCOLOGY: COMMUNICATING WITH OTHERS  (ENGLISH)    WEAKNESS (UNCERTAIN CAUSE) (ENGLISH)                  Patient Instructions     Instructions were provided via the [facility] patient resources and are available for the patient to view on the patient portal.    Next Steps:Educate pt on coping skills d/t cancer                     Educate pt on safety/falls    No follow-ups on file.      Todays OPCM Self-Management Care Plan was developed with the patients/caregivers input and was based on identified barriers from todays assessment.  Goals were written today with the patient/caregiver and the patient has agreed to work towards these goals to improve his/her overall well-being. Patient verbalized understanding of the care plan, goals, and all of today's instructions. Encouraged patient/caregiver to communicate with his/her physician and health care team about health conditions and the treatment plan.  Provided my contact information today and encouraged patient/caregiver to call me with any questions as needed.

## 2020-01-31 ENCOUNTER — EXTERNAL CHRONIC CARE MANAGEMENT (OUTPATIENT)
Dept: PRIMARY CARE CLINIC | Facility: CLINIC | Age: 70
End: 2020-01-31
Payer: MEDICARE

## 2020-01-31 PROCEDURE — 99490 PR CHRONIC CARE MGMT, 1ST 20 MIN: ICD-10-PCS | Mod: S$GLB,,, | Performed by: INTERNAL MEDICINE

## 2020-01-31 PROCEDURE — 99490 CHRNC CARE MGMT STAFF 1ST 20: CPT | Mod: S$GLB,,, | Performed by: INTERNAL MEDICINE

## 2020-02-05 ENCOUNTER — OFFICE VISIT (OUTPATIENT)
Dept: FAMILY MEDICINE | Facility: CLINIC | Age: 70
End: 2020-02-05
Payer: MEDICARE

## 2020-02-05 VITALS
WEIGHT: 178.88 LBS | DIASTOLIC BLOOD PRESSURE: 60 MMHG | HEIGHT: 66 IN | SYSTOLIC BLOOD PRESSURE: 134 MMHG | TEMPERATURE: 98 F | HEART RATE: 98 BPM | BODY MASS INDEX: 28.75 KG/M2 | OXYGEN SATURATION: 96 %

## 2020-02-05 DIAGNOSIS — G89.29 CHRONIC ABDOMINAL PAIN: Primary | ICD-10-CM

## 2020-02-05 DIAGNOSIS — I1A.0 RESISTANT HYPERTENSION: ICD-10-CM

## 2020-02-05 DIAGNOSIS — F39 MOOD DISORDER: ICD-10-CM

## 2020-02-05 DIAGNOSIS — L29.9 PRURITUS: ICD-10-CM

## 2020-02-05 DIAGNOSIS — C7B.00 METASTATIC CARCINOID TUMOR: Chronic | ICD-10-CM

## 2020-02-05 DIAGNOSIS — C7A.8 NEUROENDOCRINE CARCINOMA, UNKNOWN PRIMARY SITE: ICD-10-CM

## 2020-02-05 DIAGNOSIS — R10.9 CHRONIC ABDOMINAL PAIN: Primary | ICD-10-CM

## 2020-02-05 PROBLEM — N20.0 BILATERAL NEPHROLITHIASIS: Status: RESOLVED | Noted: 2019-09-17 | Resolved: 2020-02-05

## 2020-02-05 PROCEDURE — 1100F PTFALLS ASSESS-DOCD GE2>/YR: CPT | Mod: HCNC,CPTII,S$GLB, | Performed by: INTERNAL MEDICINE

## 2020-02-05 PROCEDURE — 3288F FALL RISK ASSESSMENT DOCD: CPT | Mod: HCNC,CPTII,S$GLB, | Performed by: INTERNAL MEDICINE

## 2020-02-05 PROCEDURE — 3288F PR FALLS RISK ASSESSMENT DOCUMENTED: ICD-10-PCS | Mod: HCNC,CPTII,S$GLB, | Performed by: INTERNAL MEDICINE

## 2020-02-05 PROCEDURE — 1125F PR PAIN SEVERITY QUANTIFIED, PAIN PRESENT: ICD-10-PCS | Mod: HCNC,S$GLB,, | Performed by: INTERNAL MEDICINE

## 2020-02-05 PROCEDURE — 1125F AMNT PAIN NOTED PAIN PRSNT: CPT | Mod: HCNC,S$GLB,, | Performed by: INTERNAL MEDICINE

## 2020-02-05 PROCEDURE — 3075F SYST BP GE 130 - 139MM HG: CPT | Mod: HCNC,CPTII,S$GLB, | Performed by: INTERNAL MEDICINE

## 2020-02-05 PROCEDURE — 99215 OFFICE O/P EST HI 40 MIN: CPT | Mod: HCNC,S$GLB,, | Performed by: INTERNAL MEDICINE

## 2020-02-05 PROCEDURE — 99999 PR PBB SHADOW E&M-EST. PATIENT-LVL III: ICD-10-PCS | Mod: PBBFAC,HCNC,, | Performed by: INTERNAL MEDICINE

## 2020-02-05 PROCEDURE — 99215 PR OFFICE/OUTPT VISIT, EST, LEVL V, 40-54 MIN: ICD-10-PCS | Mod: HCNC,S$GLB,, | Performed by: INTERNAL MEDICINE

## 2020-02-05 PROCEDURE — 99499 UNLISTED E&M SERVICE: CPT | Mod: HCNC,S$GLB,, | Performed by: INTERNAL MEDICINE

## 2020-02-05 PROCEDURE — 99999 PR PBB SHADOW E&M-EST. PATIENT-LVL III: CPT | Mod: PBBFAC,HCNC,, | Performed by: INTERNAL MEDICINE

## 2020-02-05 PROCEDURE — 3075F PR MOST RECENT SYSTOLIC BLOOD PRESS GE 130-139MM HG: ICD-10-PCS | Mod: HCNC,CPTII,S$GLB, | Performed by: INTERNAL MEDICINE

## 2020-02-05 PROCEDURE — 99499 RISK ADDL DX/OHS AUDIT: ICD-10-PCS | Mod: HCNC,S$GLB,, | Performed by: INTERNAL MEDICINE

## 2020-02-05 PROCEDURE — 1159F PR MEDICATION LIST DOCUMENTED IN MEDICAL RECORD: ICD-10-PCS | Mod: HCNC,S$GLB,, | Performed by: INTERNAL MEDICINE

## 2020-02-05 PROCEDURE — 3078F PR MOST RECENT DIASTOLIC BLOOD PRESSURE < 80 MM HG: ICD-10-PCS | Mod: HCNC,CPTII,S$GLB, | Performed by: INTERNAL MEDICINE

## 2020-02-05 PROCEDURE — 3078F DIAST BP <80 MM HG: CPT | Mod: HCNC,CPTII,S$GLB, | Performed by: INTERNAL MEDICINE

## 2020-02-05 PROCEDURE — 1159F MED LIST DOCD IN RCRD: CPT | Mod: HCNC,S$GLB,, | Performed by: INTERNAL MEDICINE

## 2020-02-05 PROCEDURE — 1100F PR PT FALLS ASSESS DOC 2+ FALLS/FALL W/INJURY/YR: ICD-10-PCS | Mod: HCNC,CPTII,S$GLB, | Performed by: INTERNAL MEDICINE

## 2020-02-05 RX ORDER — TRAZODONE HYDROCHLORIDE 50 MG/1
TABLET ORAL
COMMUNITY
Start: 2019-12-19 | End: 2020-07-21

## 2020-02-05 RX ORDER — MONTELUKAST SODIUM 4 MG/1
TABLET, CHEWABLE ORAL
Status: ON HOLD | COMMUNITY
Start: 2020-01-22 | End: 2021-03-15

## 2020-02-05 RX ORDER — LOSARTAN POTASSIUM 50 MG/1
50 TABLET ORAL DAILY
Qty: 90 TABLET | Refills: 1 | Status: SHIPPED | OUTPATIENT
Start: 2020-02-05 | End: 2020-03-02 | Stop reason: SDUPTHER

## 2020-02-05 RX ORDER — TRAMADOL HYDROCHLORIDE 50 MG/1
TABLET ORAL
Status: ON HOLD | COMMUNITY
Start: 2020-01-13 | End: 2021-03-15

## 2020-02-05 RX ORDER — CHOLESTYRAMINE 4 G/9G
POWDER, FOR SUSPENSION ORAL
Status: ON HOLD | COMMUNITY
Start: 2020-01-16 | End: 2021-03-15

## 2020-02-05 RX ORDER — METOPROLOL TARTRATE 50 MG/1
50 TABLET ORAL 2 TIMES DAILY
Qty: 180 TABLET | Refills: 1 | Status: SHIPPED | OUTPATIENT
Start: 2020-02-05 | End: 2020-05-29 | Stop reason: SDUPTHER

## 2020-02-05 RX ORDER — PANCRELIPASE 24000; 76000; 120000 [USP'U]/1; [USP'U]/1; [USP'U]/1
CAPSULE, DELAYED RELEASE PELLETS ORAL
Status: ON HOLD | COMMUNITY
Start: 2020-01-16 | End: 2021-03-15

## 2020-02-05 RX ORDER — DICYCLOMINE HYDROCHLORIDE 20 MG/1
20 TABLET ORAL
COMMUNITY
Start: 2020-01-13 | End: 2021-01-12

## 2020-02-05 RX ORDER — SODIUM, POTASSIUM,MAG SULFATES 17.5-3.13G
SOLUTION, RECONSTITUTED, ORAL ORAL
Status: ON HOLD | COMMUNITY
Start: 2020-01-22 | End: 2021-03-15

## 2020-02-05 NOTE — PROGRESS NOTES
Subjective:     Chief Complaint  Chief Complaint   Patient presents with    Diarrhea    Abdominal Pain    ED Follow Up       JENNY Domingo is a 69 y.o. female with medical diagnoses as listed in the medical history and problem list that presents for ED follow-up.  Last clinic visit was 2019.      Pain continues with chronic abdominal pain  Pain is burning in nature, comes and goes  Diarrhea has still been present at times as well  No blood in stools are dark/tarry stools  Taking Imodium every 4 hours approximately  She is eating little - appetite is poor  Pain improved slightly with her oxycodone recently - taking up to once times daily    Seen at [facility] for evaluation yesterday (20)  She says she was started on a new medication - unsure which medication   She is scheduled for a colonoscopy on     Still experiencing right shoulder pain  - mild relief    Irritant dermatitis of left neck/shoulder - [place] not helpful      Patient Care Team:  Pantera Rosen MD as PCP - General (Internal Medicine)  Stan Marques MD as Consulting Physician (Urology)  Kirsten Hoffmann MA as Care Coordinator  Kirsten Hoffmann MA as Care Coordinator  Char Robbins MD as Consulting Physician (Nephrology)  Padmini Clements, RN as Outpatient   Tracie Weil-Cavalier, RD as Dietitian (Nutrition)      PAST MEDICAL HISTORY:  Past Medical History:   Diagnosis Date    Arthritis     Cataract     Colon cancer     Encounter for blood transfusion     HTN (hypertension)     Kidney stones     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     colon    Pyelonephritis, acute     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)        PAST SURGICAL HISTORY:  Past Surgical History:   Procedure Laterality Date    CATARACT EXTRACTION Left 10/2017     SECTION      CHOLECYSTECTOMY      COLON SURGERY      cystoscope      CYSTOSCOPY W/ RETROGRADES Right 10/10/2019     Procedure: CYSTOSCOPY, WITH RETROGRADE PYELOGRAM;  Surgeon: Gen Isbell MD;  Location: [facility];  Service: Urology;  Laterality: Right;    EYE SURGERY      HYSTERECTOMY  5/1996    LITHOTRIPSY      LIVER BIOPSY  9/14    carcinoid    URETEROSCOPY Right 10/10/2019    Procedure: URETEROSCOPY;  Surgeon: Gen Isbell MD;  Location: [facility];  Service: Urology;  Laterality: Right;    UTERINE FIBROID SURGERY         SOCIAL HISTORY:  Social History     Socioeconomic History    Marital status:      Spouse name: Not on file    Number of children: Not on file    Years of education: Not on file    Highest education level: Not on file   Occupational History    Occupation: disabled    Social Needs    Financial resource strain: Somewhat hard    Food insecurity:     Worry: Never true     Inability: Never true    Transportation needs:     Medical: Yes     Non-medical: Yes   Tobacco Use    Smoking status: Never Smoker    Smokeless tobacco: Never Used   Substance and Sexual Activity    Alcohol use: No     Alcohol/week: 0.0 standard drinks     Frequency: Never     Binge frequency: Never    Drug use: No    Sexual activity: Not Currently   Lifestyle    Physical activity:     Days per week: 2 days     Minutes per session: 10 min    Stress: Very much   Relationships    Social connections:     Talks on phone: Not on file     Gets together: Not on file     Attends Nondenominational service: Not on file     Active member of club or organization: Not on file     Attends meetings of clubs or organizations: Not on file     Relationship status:    Other Topics Concern    Not on file   Social History Narrative    Pt lives with son       FAMILY HISTORY:  Family History   Problem Relation Age of Onset    Cancer Mother         unknown    Alzheimer's disease Father     Stroke Sister     No Known Problems Son     No Known Problems Son     No Known Problems Son     No Known Problems Son      Kidney disease Neg Hx        ALLERGIES AND MEDICATIONS: updated and reviewed.  Review of patient's allergies indicates:   Allergen Reactions    Epinephrine Anaphylaxis     Can cause  a Carcinoid Crisis    Contrast media Hives, Itching and Swelling    Ibuprofen Hives, Itching and Swelling    Iodinated contrast media     Sulfa (sulfonamide antibiotics) Hives, Itching and Swelling     Current Outpatient Medications   Medication Sig Dispense Refill    bumetanide (BUMEX) 0.5 MG Tab Take 1 tablet (0.5 mg total) by mouth daily as needed. 30 tablet 2    cholestyramine (QUESTRAN) 4 gram packet       ciprofloxacin HCl (CIPRO) 500 MG tablet Take 1 tablet (500 mg total) by mouth 2 (two) times daily. 20 tablet 0    cloNIDine (CATAPRES) 0.1 MG tablet TAKE ONE TABLET BY MOUTH THREE TIMES DAILY AS NEEDED FOR signs of withdrawal  1    colestipoL (COLESTID) 1 gram Tab       CREON 24,000-76,000 -120,000 unit capsule       cyanocobalamin (VITAMIN B-12) 1000 MCG tablet Take 1 tablet (1,000 mcg total) by mouth once daily. 30 tablet 5    diclofenac sodium (VOLTAREN) 1 % Gel Apply the gel (2 g) to the affected area 4 times daily. Do not apply more than 8 g daily to any one affected joint of the upper extremities. 100 g 1    dicyclomine (BENTYL) 20 mg tablet Take 20 mg by mouth.      diphenoxylate-atropine 2.5-0.025 mg (LOMOTIL) 2.5-0.025 mg per tablet Take 1 tablet by mouth 4 (four) times daily as needed for Diarrhea (for episodic diarrhea). 30 tablet 0    ferrous sulfate (FEOSOL) 325 mg (65 mg iron) Tab tablet TAKE ONE TABLET BY MOUTH THREE TIMES PER WEEK 12 tablet 2    loperamide (IMODIUM) 2 mg capsule Take 1 capsule (2 mg total) by mouth 4 (four) times daily. 60 capsule 3    losartan (COZAAR) 50 MG tablet Take 1 tablet (50 mg total) by mouth once daily. 90 tablet 1    metoprolol tartrate (LOPRESSOR) 50 MG tablet Take 1 tablet (50 mg total) by mouth 2 (two) times daily. 180 tablet 1    metroNIDAZOLE (FLAGYL) 500 MG  "tablet Take 1 tablet (500 mg total) by mouth 3 (three) times daily. 30 tablet 0    ondansetron (ZOFRAN) 4 MG tablet Take 1 tablet (4 mg total) by mouth every 8 (eight) hours as needed for Nausea. 30 tablet 0    ondansetron (ZOFRAN) 4 MG tablet Take 1 tablet (4 mg total) by mouth every 8 (eight) hours as needed for Nausea. 40 tablet 2    oxyCODONE (ROXICODONE) 15 MG Tab TK 1 T PO  Q 4 H prn  0    promethazine (PHENERGAN) 12.5 MG Tab Take 1 tablet (12.5 mg total) by mouth every 8 (eight) hours as needed (nausea). 6 tablet 0    psyllium (METAMUCIL SUGAR FREE) Powd Take 1 packet by mouth 3 (three) times daily. 90 each 2    SUPREP BOWEL PREP KIT 17.5-3.13-1.6 gram SolR       tamsulosin (FLOMAX) 0.4 mg Cap Take 1 capsule (0.4 mg total) by mouth every evening. 30 capsule 0    traMADol (ULTRAM) 50 mg tablet       traZODone (DESYREL) 50 MG tablet       trolamine salicylate (ASPERCREME) 10 % cream Apply topically as needed.      hydrocortisone valerate (WEST-KAREN) 0.2 % ointment Apply topically 2 (two) times daily. for 7 days 60 g 0     No current facility-administered medications for this visit.          ROS:  Review of Systems   Constitutional: Positive for diaphoresis. Negative for chills and fever.   Gastrointestinal: Positive for abdominal pain (R sided), diarrhea, nausea and vomiting.   Musculoskeletal: Positive for arthralgias (R shoulder).       Objective:       Physical Exam  Vitals:    02/05/20 1337   BP: 134/60   BP Location: Right arm   Patient Position: Sitting   BP Method: Medium (Manual)   Pulse: 98   Temp: 98.4 °F (36.9 °C)   TempSrc: Oral   SpO2: 96%   Weight: 81.1 kg (178 lb 14.5 oz)   Height: 5' 6" (1.676 m)    Body mass index is 28.88 kg/m².  Weight: 81.1 kg (178 lb 14.5 oz)   Height: 5' 6" (167.6 cm)   Physical Exam   Constitutional: She appears well-developed. No distress.   HENT:   Head: Normocephalic and atraumatic.   Cardiovascular: Normal rate.   Pulmonary/Chest: Effort normal. "   Abdominal: Soft. She exhibits no mass. There is tenderness (RLQ). No hernia.   Musculoskeletal: She exhibits no edema or deformity.   Neurological: She is alert.   Skin: Skin is warm and dry.   Psychiatric: She has a normal mood and affect. Her behavior is normal.   Vitals reviewed.          Assessment:     1. Metastatic carcinoid tumor    2. Chronic abdominal pain    3. Neuroendocrine carcinoma, unknown primary site    4. Resistant hypertension    5. Mood disorder      Plan:     Patito was seen today for hospital follow up.    Diagnoses and all orders for this visit:    Chronic abdominal pain  Pending evaluation per Gastroenterology - colonoscopy on 2/26 per Dr Dean    Neuroendocrine carcinoma  Following up with [facility] in March 2020    Resistant hypertension  BP controlled presently - reviewed anti-hypertensive regimen - continue current therapy    Mood disorder   Mild improvement in symptoms - monitor closely    Pruritus of skin  Recommend OTC anti-itch cream (Sarna)    3 month f/u     The patient expressed understanding and no barriers to adherence were identified.     1. The patient indicates understanding of these issues and agrees with the plan. Brief care plan is updated and reviewed with the patient as applicable.     2. The patient is given an After Visit Summary that lists all medications with directions, allergies, orders placed during this encounter and follow-up instructions.     3. I have reviewed the patient's medical information including past medical, family, and social history sections including the medications and allergies.     4. We discussed the patient's current medications. I reconciled the patient's medication list and prepared and supplied needed refills.       Pantera Rosen MD  Internal Medicine-Pediatrics

## 2020-02-08 DIAGNOSIS — R60.0 LOWER EXTREMITY EDEMA: ICD-10-CM

## 2020-02-08 RX ORDER — BUMETANIDE 0.5 MG/1
0.5 TABLET ORAL DAILY PRN
Qty: 30 TABLET | Refills: 0 | Status: SHIPPED | OUTPATIENT
Start: 2020-02-08 | End: 2020-04-14

## 2020-02-12 ENCOUNTER — OUTPATIENT CASE MANAGEMENT (OUTPATIENT)
Dept: ADMINISTRATIVE | Facility: OTHER | Age: 70
End: 2020-02-12

## 2020-02-12 ENCOUNTER — TELEPHONE (OUTPATIENT)
Dept: FAMILY MEDICINE | Facility: CLINIC | Age: 70
End: 2020-02-12

## 2020-02-12 NOTE — TELEPHONE ENCOUNTER
Received message - understandably patient with unclear cause of her abdominal/gastrointestinal symptoms. She is to see Lizzette POTTER (Dr Dean) for evaluation on 2/26. I do not have any further recommendations regarding her abdominal pain and diarrhea other than evaluation by Gastroenterology at this time and understand this has been very difficult for her.

## 2020-02-12 NOTE — PROGRESS NOTES
Outpatient Care Management  Plan of Care Follow Up Visit    Patient: Patito Domingo  MRN: [id number]  Date of Service: 02/12/2020  Completed by: Padmini Clements RN  Referral Date: 11/13/2019  Program: Case Management (High Risk)    No chief complaint on file.      Brief Summary: Pt reports that she is having diarrhea. Pt reports that she had 4 watery stools since 6 this morning. Pt reports that it is hard to try to care for herself. Pt reports that she can not sleep due to afraid of being incontinent of bowels during the night.Pt reports that the stomach pain feels like a burning sensation. Pt reports that she has to bring a stool sample this morning to the Oncologist at [facility]. Pt reports that she recently had an appt with the PCP. Pt reports that she it taking her pain medicine for the abd pain. Pt reports that she would like to get an assistant to aide with her care. Pt reports that she has talked with her pain management doctor, PCP, and oncologist about the abd pain. Pt reports that she was on tramadol at one time the helped with the pain. Pt reports that the pain is a burning abd pain. Pt reports that the pain is in her side. Pt reports that she has the tumors in her stomach and a mass on the liver. Pt reports that she needs something to relieve the pain. Pt reports that she does not want a bedside commode because she will not have someone to help her with cleaning the commode. Pt reports that she wants something to help her to get some sleep. Pt educated to take meds for diarrhea as prescribed to help with decreasing the episodes. Educated pt to drink adequate amts of fluids during the day when she having diarrhea. Message sent to PCP's staff to notify of pt's complaints. Educated pt on arranging surroundings to help prevent the risk of falls. Will refer LMSW back to the case due to request for assistance in the home.         Patient Summary     Involvement of Care:  Do I have permission  to speak with other family members about your care?  no    Patient Reported Labs & Vitals:  1.  Any Patient Reported Labs & Vitals?     2.  Patient Reported Blood Pressure:     3.  Patient Reported Pulse:     4.  Patient Reported Weight (Kg):     5.  Patient Reported Blood Glucose (mg/dl):       Medical History:  Reviewed medical history with patient and/or caregiver    Social History:  Reviewed social history with patient and/or caregiver    Clinical Assessment     Reviewed and provided basic information on available community resources for mental health, transportation, wellness resources, and palliative care programs with patient and/or caregiver.    Complex Care Plan     Care plan was discussed and completed today with input from patient and/or caregiver.    Goals      Patient/caregiver will accept life style changes to manage and improve coping due to Metastatic Carcinoid tumor prior to discharge from OPCM. - Priority: High      Overall Time to Completion  2 months from 11/20/2019     OPCM Identified Patient Barriers:  Depression: LSMW; Referred        OPCM Identified Disease Education Barriers:  Hypertension:  Care plan created  Falls: Care plan created          Short Term Goals  Patient/caregiver will verbalize 2 risk factors of Ineffective coping within 1 month.  Interventions   · Assess patient's ability to perform ADLs.  · Assess type of communication barriers.  · Collaborate with Physician as appropriate to meet patient needs.  · Provide contact information for 24 hour Crisis Line number- [facility] LINE.  · Recognize and provide educational material (REMEDIOS).  · Refer to Outpatient Case Management Social Worker.     Status  · Met12/4/19, 12/12/19      Patient/caregiver will verbalize 2 strategies to effectivecoping strategies within 1 month.  Interventions   · Assess type of communication barriers. 1/30/2020  · Collaborate with Physician as appropriate to meet patient needs.  · Facilitate referral for  counseling.  · Provide contact information for 24 hour Crisis Line number- [facility] LINE.1/30/2020  · Recognize and provide educational material (KRAMES).  · Refer to Outpatient Case Management Social Worker.  Patient/Caregiver agrees to pt will verbalize willingness to consider counseling for coping by  2 weeks.  · Patient/Caregiver agrees to OPCM follow up within 2 weeks to assess progress to goal.   ·      Status  · Partially met           Clinical Reference Documents Added to Patient Instructions       Document    CANCER, RESOURCES FOR PEOPLE WITH (ENGLISH)    FALLS IN THE HOME, PREVENTING (ENGLISH)    FALLS, PREVENTING, MAKE YOUR HEALTH A PRIORITY (ENGLISH)    ONCOLOGY: COMMUNICATING WITH OTHERS  (ENGLISH)    WEAKNESS (UNCERTAIN CAUSE) (ENGLISH)             Patient/caregiver will accept life style changes to manage and improve Diarrhea prior to discharge from OPCM. - Priority: High      Overall Time to Completion  2 weeks from 01/16/2020     OPCM Identified Patient Needs:    Depression: LSMW; Referred        OPCM Identified Disease Education Barriers:  Hypertension:  Care plan created  Falls: Care plan created       Short Term Goals  Patient/caregiver will verbalize 2 risk factors of Diarrhea within 2 weeks.  Interventions   · Assess patient's ability to perform ADLs.1/30/2020  · Collaborate with Physician as appropriate to meet patient needs.1/16/2020  · Recognize and provide educational material (KRAMES).  Patient/Caregiver agrees to take anti diarrhea meds as prescribed by  2 weeks.  · Patient/Caregiver agrees to OPCM follow up within 2 week to assess progress to goal. 2/12/2020.  ·      Status  · Met 1/30/2020      Patient/caregiver will verbalize 2 ways of preventing complications due to disease process within 2 weeks.  Interventions   · Assess patient's ability to perform ADLs.1/30/2020  · Collaborate with Physician as appropriate to meet patient needs.  · Encourage Dietary Compliance.  · Encourage  Medication Compliance.  · Recognize and provide educational material (REMEDIOS).     Status  · Met 1/30/2020             Patient/caregiver will accept life style changes to manage and improve risk of Falls prior to discharge from OPCM. - Priority: High      Overall Time to Completion  2 months from 11/20/2019     OPCM Identified Patient Barriers:    Depression: LSMW; Referred        OPCM Identified Disease Education Barriers:  Hypertension:  Care plan created  Falls: Care plan created                     Short Term Goals  Patient/caregiver will verbalize 2 risk factors of Falls within 1 month.  Interventions   · Assess patient's ability to perform ADLs. 2/12/2020  · Collaborate with Physician as appropriate to meet patient needs.  · Discuss alternate Levels of Care with patient/caregiver.  · Encourage Exercise.  · Facilitate Home Health Services.  · Facilitate referral to Outpatient Rehab.  · Recognize and provide educational material (REMEDIOS).  · Refer to Outpatient Case Management Social Worker.2/12/2020 re-referred    Patient/Caregiver agrees to have surroundings arranged to prevent risk of falls by  2 weeks.  Patient/Caregiver agrees to OPCM follow up within 2 week to assess progress to goal.        Status  · Partially met      Patient/caregiver will verbalize 2 strategies to rest breaks and manage fatigue within 1 month.  Interventions   · Assess patient's ability to perform ADLs.  · Collaborate with Physician as appropriate to meet patient needs.  · Recognize and provide educational material (REMEDIOS).     Status  · Partially met           Clinical Reference Documents Added to Patient Instructions       Document    CANCER, RESOURCES FOR PEOPLE WITH (ENGLISH)    FALLS IN THE HOME, PREVENTING (ENGLISH)    FALLS, PREVENTING, MAKE YOUR HEALTH A PRIORITY (ENGLISH)    ONCOLOGY: COMMUNICATING WITH OTHERS  (ENGLISH)    WEAKNESS (UNCERTAIN CAUSE) (ENGLISH)                  Patient Instructions     Instructions were  provided via the [employer] patient resources and are available for the patient to view on the patient portal.    Next Steps: f/u with diarrhea                      Educate on risk factors and prevention of complications from diarrhea.  No follow-ups on file.      Todays OPCM Self-Management Care Plan was developed with the patients/caregivers input and was based on identified barriers from todays assessment.  Goals were written today with the patient/caregiver and the patient has agreed to work towards these goals to improve his/her overall well-being. Patient verbalized understanding of the care plan, goals, and all of today's instructions. Encouraged patient/caregiver to communicate with his/her physician and health care team about health conditions and the treatment plan.  Provided my contact information today and encouraged patient/caregiver to call me with any questions as needed.

## 2020-02-12 NOTE — TELEPHONE ENCOUNTER
----- Message from Ramona Hdz LPN sent at 2/12/2020 12:32 PM CST -----  Contact: PANCHITO Hong   ----- Message -----  From: Padmini Clements RN  Sent: 2/12/2020  10:22 AM CST  To: Silas Mott Staff    Lai this is Padmini with [facility] Case Management. Pt reports that she is having diarrhea. Reports that she is bringing a stool sample to the Oncologist today. Reports that she is having a burning pain in her stomach. Pt reports that she is not sleeping due to the pain. Rates the pain 9/10. Reports that she has informed PCP, pain management doctor, and oncologist about the pain.    Please advise  Thank you

## 2020-02-13 ENCOUNTER — TELEPHONE (OUTPATIENT)
Dept: FAMILY MEDICINE | Facility: CLINIC | Age: 70
End: 2020-02-13

## 2020-02-13 ENCOUNTER — OUTPATIENT CASE MANAGEMENT (OUTPATIENT)
Dept: ADMINISTRATIVE | Facility: OTHER | Age: 70
End: 2020-02-13

## 2020-02-13 NOTE — TELEPHONE ENCOUNTER
----- Message from Betty Kerr sent at 2/13/2020 10:20 AM CST -----  Contact: [employer]: [phone] ext [phone]  Type: Patient Call Back    Who called:Self    What is the request in detail:Patient was admitted into hospital on 2- at [facility] for nausea and vomitting    Can the clinic reply by [facility]? NO        Best call back number:[phone]

## 2020-02-13 NOTE — PROGRESS NOTES
Outpatient Care Management  Plan of Care Follow Up Visit    Patient: Patito Domingo  MRN: [id number]  Date of Service: 02/13/2020  Completed by: Padmini Clements RN  Referral Date: 11/13/2019  Program: Case Management (High Risk)    No chief complaint on file.      Brief Summary: Called pt to f/u from her c/o pain and diarrhea. Pt reports that when she brought the stool sample to the GI doctor yesterday he decided to admit her to the hospital. Reports that she tried to call this cm to notify of admit to hospital.Reports that her potassium was low and she has a UTI. Informed pt that the CM will f/u on discharge.     Patient Summary     Involvement of Care:  Do I have permission to speak with other family members about your care?  yes    Patient Reported Labs & Vitals:  1.  Any Patient Reported Labs & Vitals?     2.  Patient Reported Blood Pressure:     3.  Patient Reported Pulse:     4.  Patient Reported Weight (Kg):     5.  Patient Reported Blood Glucose (mg/dl):       Medical History:  Reviewed medical history with patient and/or caregiver    Social History:  Reviewed social history with patient and/or caregiver    Clinical Assessment     Reviewed and provided basic information on available community resources for mental health, transportation, wellness resources, and palliative care programs with patient and/or caregiver.    Complex Care Plan     Care plan was discussed and completed today with input from patient and/or caregiver.    Goals      Patient/caregiver will accept life style changes to manage and improve coping due to Metastatic Carcinoid tumor prior to discharge from OPCM. - Priority: High      Overall Time to Completion  2 months from 11/20/2019     OPCM Identified Patient Barriers:  Depression: LSMW; Referred        OPCM Identified Disease Education Barriers:  Hypertension:  Care plan created  Falls: Care plan created          Short Term Goals  Patient/caregiver will verbalize 2 risk factors of  Ineffective coping within 1 month.  Interventions   · Assess patient's ability to perform ADLs.  · Assess type of communication barriers.  · Collaborate with Physician as appropriate to meet patient needs.  · Provide contact information for 24 hour Crisis Line number- COP[facility] LINE.  · Recognize and provide educational material (KRAPHILIP).  · Refer to Outpatient Case Management Social Worker.     Status  · Met12/4/19, 12/12/19      Patient/caregiver will verbalize 2 strategies to effectivecoping strategies within 1 month.  Interventions   · Assess type of communication barriers. 1/30/2020  · Collaborate with Physician as appropriate to meet patient needs.  · Facilitate referral for counseling.  · Provide contact information for 24 hour Crisis Line number- [facility] LINE.1/30/2020  · Recognize and provide educational material (KRAPHILIP).  · Refer to Outpatient Case Management Social Worker.  Patient/Caregiver agrees to pt will verbalize willingness to consider counseling for coping by  2 weeks.  · Patient/Caregiver agrees to OPCM follow up within 2 weeks to assess progress to goal.   ·      Status  · Partially met           Clinical Reference Documents Added to Patient Instructions       Document    CANCER, RESOURCES FOR PEOPLE WITH (ENGLISH)    FALLS IN THE HOME, PREVENTING (ENGLISH)    FALLS, PREVENTING, MAKE YOUR HEALTH A PRIORITY (ENGLISH)    ONCOLOGY: COMMUNICATING WITH OTHERS  (ENGLISH)    WEAKNESS (UNCERTAIN CAUSE) (ENGLISH)             Patient/caregiver will accept life style changes to manage and improve Diarrhea prior to discharge from OPCM. - Priority: High      Overall Time to Completion  2 weeks from 01/16/2020     OPCM Identified Patient Needs:    Depression: LSMW; Referred        OPCM Identified Disease Education Barriers:  Hypertension:  Care plan created  Falls: Care plan created       Short Term Goals  Patient/caregiver will verbalize 2 risk factors of Diarrhea within 2 weeks.  Interventions   · Assess  patient's ability to perform ADLs.1/30/2020  · Collaborate with Physician as appropriate to meet patient needs.1/16/2020  · Recognize and provide educational material (KRAMES).     Status  · Met 1/30/2020      Patient/caregiver will verbalize 2 ways of preventing complications due to disease process within 2 weeks.  Interventions   · Assess patient's ability to perform ADLs.1/30/2020  · Collaborate with Physician as appropriate to meet patient needs.  · Encourage Dietary Compliance.  · Encourage Medication Compliance.  · Recognize and provide educational material (KRAMES).     Status  · Met 1/30/2020             Patient/caregiver will accept life style changes to manage and improve risk of Falls prior to discharge from OPCM. - Priority: High      Overall Time to Completion  2 months from 11/20/2019     OPCM Identified Patient Barriers:    Depression: LSMW; Referred        OPCM Identified Disease Education Barriers:  Hypertension:  Care plan created  Falls: Care plan created                     Short Term Goals  Patient/caregiver will verbalize 2 risk factors of Falls within 1 month.  Interventions   · Assess patient's ability to perform ADLs.  · Collaborate with Physician as appropriate to meet patient needs.  · Discuss alternate Levels of Care with patient/caregiver.  · Encourage Exercise.  · Facilitate Home Health Services.  · Facilitate referral to Outpatient Rehab.  · Recognize and provide educational material (KRAMES).  · Refer to Outpatient Case Management Social Worker.     Status  · Partially met      Patient/caregiver will verbalize 2 strategies to rest breaks and manage fatigue within 1 month.  Interventions   · Assess patient's ability to perform ADLs.  · Collaborate with Physician as appropriate to meet patient needs.  · Recognize and provide educational material (KRAMES).     Status  · Partially met           Clinical Reference Documents Added to Patient Instructions       Document    CANCER, RESOURCES  FOR PEOPLE WITH (ENGLISH)    FALLS IN THE HOME, PREVENTING (ENGLISH)    FALLS, PREVENTING, MAKE YOUR HEALTH A PRIORITY (ENGLISH)    ONCOLOGY: COMMUNICATING WITH OTHERS  (ENGLISH)    WEAKNESS (UNCERTAIN CAUSE) (ENGLISH)                  Patient Instructions     Instructions were provided via the [employer] patient resources and are available for the patient to view on the patient portal.    Next Steps: Educate on risk factor and prevention of complications of diarrhea.   Follow up for RN Follow.      Todays OPCM Self-Management Care Plan was developed with the patients/caregivers input and was based on identified barriers from todays assessment.  Goals were written today with the patient/caregiver and the patient has agreed to work towards these goals to improve his/her overall well-being. Patient verbalized understanding of the care plan, goals, and all of today's instructions. Encouraged patient/caregiver to communicate with his/her physician and health care team about health conditions and the treatment plan.  Provided my contact information today and encouraged patient/caregiver to call me with any questions as needed.

## 2020-02-14 ENCOUNTER — TELEPHONE (OUTPATIENT)
Dept: NEUROLOGY | Facility: HOSPITAL | Age: 70
End: 2020-02-14

## 2020-02-21 ENCOUNTER — OUTPATIENT CASE MANAGEMENT (OUTPATIENT)
Dept: ADMINISTRATIVE | Facility: OTHER | Age: 70
End: 2020-02-21

## 2020-02-26 ENCOUNTER — DOCUMENTATION ONLY (OUTPATIENT)
Dept: REHABILITATION | Facility: HOSPITAL | Age: 70
End: 2020-02-26

## 2020-02-26 NOTE — PROGRESS NOTES
"  Outpatient Therapy Discharge Summary     Name: Patito COLLADO [facility] [address] Number: [id number]    Therapy Diagnosis:   1. Impaired functional mobility, balance, gait, and endurance      2. Decreased strength         Physician: Pantera Rosen MD     Physician Orders: PT Eval and Treat gait/ balance problems  Medical Diagnosis from Referral: R26.81 (ICD-10-CM) - Gait instability  Evaluation Date: 1/10/2020  Authorization Period Expiration: 1/7/2021  Plan of Care Expiration: 3/6/2020    Date of Last visit: 1/10/2020  Total Visits Received: 1        OBJECTIVE   Refer to PT eval dated 1/10/2020 for functional status      Assessment    69 year old female with diagnosis of gait instability referred to [facility] attended PT evaluation only. Pt did not attend any follow up sessions and shortly after evaluation, was admitted to ER due to abdominal pain and hypokalemia. Pt had multiple ER visits due to same complaints. PT is d/c as pt is not medically stable to continue. Pt can continue with PT one deemed medically stable by MD.       Goals:   Short Term Goals:   1. Pt will perform HEP for general strengthening and cardiovascular training with supervision to improve carryover of progress. Not met  2. Pt will demonstrate improved balance for negotiating all surface types by performing romberg on foam with eyes open x 30 sec. Not met  3. Pt will demonstrate improved dynamic standing balance for household cleaning tasks by performing a functional reach of 5".  Not met  4. Pt will demonstrate a gross improvement in BLE strength by performing 5 sit<>stands with UE support in 15 sec.  Not met        Long Term Goals: 8 weeks   1. Pt will demonstrate improved safety in dim environments and improved se of balance strategies by performing romberg on firm surface with eyes closed x 30 sec.  Not met   2. Pt will demonstrate improved mobility and decreased fall risk within the home by performing TUG with Rolator in 15 " sec or less.  Not met  3. Pt will demonstrate decreased fall risk from moderate to low and improve fear of falling by scoring 45/56 on Gonzalez Balance Assessment.  Not met  4. Pt will report performance of HEP at least 2 days/ week to progress towards maintenance plan for d/c.  Not met    Discharge reason: Patient has been hospitalized    Plan   This patient is discharged from Physical Therapy

## 2020-03-02 ENCOUNTER — OFFICE VISIT (OUTPATIENT)
Dept: FAMILY MEDICINE | Facility: CLINIC | Age: 70
End: 2020-03-02
Payer: MEDICARE

## 2020-03-02 VITALS
BODY MASS INDEX: 28.95 KG/M2 | SYSTOLIC BLOOD PRESSURE: 134 MMHG | WEIGHT: 180.13 LBS | HEIGHT: 66 IN | HEART RATE: 60 BPM | RESPIRATION RATE: 18 BRPM | TEMPERATURE: 98 F | OXYGEN SATURATION: 99 % | DIASTOLIC BLOOD PRESSURE: 76 MMHG

## 2020-03-02 DIAGNOSIS — Z23 NEEDS FLU SHOT: ICD-10-CM

## 2020-03-02 DIAGNOSIS — I1A.0 RESISTANT HYPERTENSION: Primary | ICD-10-CM

## 2020-03-02 DIAGNOSIS — C7B.00 METASTATIC CARCINOID TUMOR: ICD-10-CM

## 2020-03-02 DIAGNOSIS — Z23 NEED FOR PNEUMOCOCCAL VACCINATION: ICD-10-CM

## 2020-03-02 DIAGNOSIS — Z86.73 HISTORY OF CVA (CEREBROVASCULAR ACCIDENT): ICD-10-CM

## 2020-03-02 DIAGNOSIS — R73.09 ELEVATED HEMOGLOBIN A1C: ICD-10-CM

## 2020-03-02 PROCEDURE — 3078F DIAST BP <80 MM HG: CPT | Mod: HCNC,CPTII,S$GLB, | Performed by: INTERNAL MEDICINE

## 2020-03-02 PROCEDURE — 3078F PR MOST RECENT DIASTOLIC BLOOD PRESSURE < 80 MM HG: ICD-10-PCS | Mod: HCNC,CPTII,S$GLB, | Performed by: INTERNAL MEDICINE

## 2020-03-02 PROCEDURE — 99499 UNLISTED E&M SERVICE: CPT | Mod: S$PBB,HCNC,, | Performed by: INTERNAL MEDICINE

## 2020-03-02 PROCEDURE — 3075F PR MOST RECENT SYSTOLIC BLOOD PRESS GE 130-139MM HG: ICD-10-PCS | Mod: HCNC,CPTII,S$GLB, | Performed by: INTERNAL MEDICINE

## 2020-03-02 PROCEDURE — 1159F PR MEDICATION LIST DOCUMENTED IN MEDICAL RECORD: ICD-10-PCS | Mod: HCNC,S$GLB,, | Performed by: INTERNAL MEDICINE

## 2020-03-02 PROCEDURE — G0008 FLU VACCINE - HIGH DOSE (65+) PRESERVATIVE FREE IM: ICD-10-PCS | Mod: HCNC,S$GLB,, | Performed by: INTERNAL MEDICINE

## 2020-03-02 PROCEDURE — 1159F MED LIST DOCD IN RCRD: CPT | Mod: HCNC,S$GLB,, | Performed by: INTERNAL MEDICINE

## 2020-03-02 PROCEDURE — G0008 ADMIN INFLUENZA VIRUS VAC: HCPCS | Mod: HCNC,S$GLB,, | Performed by: INTERNAL MEDICINE

## 2020-03-02 PROCEDURE — 90732 PPSV23 VACC 2 YRS+ SUBQ/IM: CPT | Mod: HCNC,S$GLB,, | Performed by: INTERNAL MEDICINE

## 2020-03-02 PROCEDURE — 3075F SYST BP GE 130 - 139MM HG: CPT | Mod: HCNC,CPTII,S$GLB, | Performed by: INTERNAL MEDICINE

## 2020-03-02 PROCEDURE — 99214 PR OFFICE/OUTPT VISIT, EST, LEVL IV, 30-39 MIN: ICD-10-PCS | Mod: 25,HCNC,S$GLB, | Performed by: INTERNAL MEDICINE

## 2020-03-02 PROCEDURE — 99999 PR PBB SHADOW E&M-EST. PATIENT-LVL III: CPT | Mod: PBBFAC,HCNC,, | Performed by: INTERNAL MEDICINE

## 2020-03-02 PROCEDURE — 1126F AMNT PAIN NOTED NONE PRSNT: CPT | Mod: HCNC,S$GLB,, | Performed by: INTERNAL MEDICINE

## 2020-03-02 PROCEDURE — G0009 ADMIN PNEUMOCOCCAL VACCINE: HCPCS | Mod: HCNC,S$GLB,, | Performed by: INTERNAL MEDICINE

## 2020-03-02 PROCEDURE — 99499 RISK ADDL DX/OHS AUDIT: ICD-10-PCS | Mod: S$PBB,HCNC,, | Performed by: INTERNAL MEDICINE

## 2020-03-02 PROCEDURE — G0009 PNEUMOCOCCAL POLYSACCHARIDE VACCINE 23-VALENT =>2YO SQ IM: ICD-10-PCS | Mod: HCNC,S$GLB,, | Performed by: INTERNAL MEDICINE

## 2020-03-02 PROCEDURE — 1101F PR PT FALLS ASSESS DOC 0-1 FALLS W/OUT INJ PAST YR: ICD-10-PCS | Mod: HCNC,CPTII,S$GLB, | Performed by: INTERNAL MEDICINE

## 2020-03-02 PROCEDURE — 1101F PT FALLS ASSESS-DOCD LE1/YR: CPT | Mod: HCNC,CPTII,S$GLB, | Performed by: INTERNAL MEDICINE

## 2020-03-02 PROCEDURE — 1126F PR PAIN SEVERITY QUANTIFIED, NO PAIN PRESENT: ICD-10-PCS | Mod: HCNC,S$GLB,, | Performed by: INTERNAL MEDICINE

## 2020-03-02 PROCEDURE — 90662 IIV NO PRSV INCREASED AG IM: CPT | Mod: HCNC,S$GLB,, | Performed by: INTERNAL MEDICINE

## 2020-03-02 PROCEDURE — 99999 PR PBB SHADOW E&M-EST. PATIENT-LVL III: ICD-10-PCS | Mod: PBBFAC,HCNC,, | Performed by: INTERNAL MEDICINE

## 2020-03-02 PROCEDURE — 90662 FLU VACCINE - HIGH DOSE (65+) PRESERVATIVE FREE IM: ICD-10-PCS | Mod: HCNC,S$GLB,, | Performed by: INTERNAL MEDICINE

## 2020-03-02 PROCEDURE — 90732 PNEUMOCOCCAL POLYSACCHARIDE VACCINE 23-VALENT =>2YO SQ IM: ICD-10-PCS | Mod: HCNC,S$GLB,, | Performed by: INTERNAL MEDICINE

## 2020-03-02 PROCEDURE — 99214 OFFICE O/P EST MOD 30 MIN: CPT | Mod: 25,HCNC,S$GLB, | Performed by: INTERNAL MEDICINE

## 2020-03-02 RX ORDER — LOSARTAN POTASSIUM 100 MG/1
100 TABLET ORAL DAILY
Qty: 90 TABLET | Refills: 1 | Status: SHIPPED | OUTPATIENT
Start: 2020-03-02 | End: 2020-08-09

## 2020-03-02 RX ORDER — DIPHENOXYLATE HYDROCHLORIDE AND ATROPINE SULFATE 2.5; .025 MG/1; MG/1
1 TABLET ORAL 4 TIMES DAILY PRN
COMMUNITY
End: 2020-07-21

## 2020-03-02 RX ORDER — ATORVASTATIN CALCIUM 80 MG/1
80 TABLET, FILM COATED ORAL NIGHTLY
COMMUNITY
Start: 2020-02-19 | End: 2020-03-02 | Stop reason: SDUPTHER

## 2020-03-02 RX ORDER — CLOPIDOGREL BISULFATE 75 MG/1
75 TABLET ORAL DAILY
Qty: 90 TABLET | Refills: 1 | Status: SHIPPED | OUTPATIENT
Start: 2020-03-02 | End: 2020-11-18 | Stop reason: SDUPTHER

## 2020-03-02 RX ORDER — CHOLESTYRAMINE 4 G/4.8G
8 POWDER, FOR SUSPENSION ORAL DAILY
COMMUNITY
Start: 2020-02-19 | End: 2020-03-20

## 2020-03-02 RX ORDER — ATORVASTATIN CALCIUM 80 MG/1
80 TABLET, FILM COATED ORAL NIGHTLY
Qty: 90 TABLET | Refills: 3 | Status: SHIPPED | OUTPATIENT
Start: 2020-03-02 | End: 2021-02-22 | Stop reason: SDUPTHER

## 2020-03-02 RX ORDER — HYDROCORTISONE VALERATE 2 MG/G
OINTMENT TOPICAL
Status: ON HOLD | COMMUNITY
Start: 2020-02-06 | End: 2021-04-12

## 2020-03-02 RX ORDER — PANTOPRAZOLE SODIUM 40 MG/1
40 FOR SUSPENSION ORAL DAILY
COMMUNITY
Start: 2020-02-19 | End: 2020-03-20

## 2020-03-02 RX ORDER — CLOPIDOGREL BISULFATE 75 MG/1
75 TABLET ORAL DAILY
COMMUNITY
Start: 2020-02-20 | End: 2020-03-02 | Stop reason: SDUPTHER

## 2020-03-02 NOTE — PROGRESS NOTES
Subjective:     Chief Complaint  Chief Complaint   Patient presents with    Follow-up       HPI  Patito Domingo is a 69 y.o. female with medical diagnoses as listed in the medical history and problem list that presents for .  Last clinic visit was 2020.      Hospitalized at [facility]  -  for CVA   Per MRI brain  focus of acute/subacute ischemia in posterior aspect of the josey  She was experiencing double vision, headache prior to admission    She had elevated blood pressure when she   Still with blurred vision, weakness generally with ambulation  Started on lipitor 80 mg daily, Plavix    Low potassium during hospital stay    Also with A1c 6.6% on     F/u scheduled for with Chely Shaw MD (Neurology) to be scheduled soon    Patient Care Team:  Pantera Rosen MD as PCP - General (Internal Medicine)  Stan Marques MD as Consulting Physician (Urology)  Kirsten Hoffmann MA as Care Coordinator  Kirsten Hoffmann MA as Care Coordinator  Char Robbins MD as Consulting Physician (Nephrology)  Padmini Clements RN as Outpatient   Tracie Weil-Cavalier, RD as Dietitian (Nutrition)      PAST MEDICAL HISTORY:  Past Medical History:   Diagnosis Date    Arthritis     Cataract     Colon cancer     Encounter for blood transfusion     HTN (hypertension)     Kidney stones     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     colon    Pyelonephritis, acute     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)        PAST SURGICAL HISTORY:  Past Surgical History:   Procedure Laterality Date    CATARACT EXTRACTION Left 10/2017     SECTION      CHOLECYSTECTOMY      COLON SURGERY      cystoscope      CYSTOSCOPY W/ RETROGRADES Right 10/10/2019    Procedure: CYSTOSCOPY, WITH RETROGRADE PYELOGRAM;  Surgeon: Gen Isbell MD;  Location: [facility];  Service: Urology;  Laterality: Right;    EYE SURGERY      HYSTERECTOMY  1996    LITHOTRIPSY      LIVER BIOPSY   9/14    carcinoid    URETEROSCOPY Right 10/10/2019    Procedure: URETEROSCOPY;  Surgeon: Gen Isbell MD;  Location: [facility];  Service: Urology;  Laterality: Right;    UTERINE FIBROID SURGERY         SOCIAL HISTORY:  Social History     Socioeconomic History    Marital status:      Spouse name: Not on file    Number of children: Not on file    Years of education: Not on file    Highest education level: Not on file   Occupational History    Occupation: disabled    Social Needs    Financial resource strain: Somewhat hard    Food insecurity:     Worry: Never true     Inability: Never true    Transportation needs:     Medical: Yes     Non-medical: Yes   Tobacco Use    Smoking status: Never Smoker    Smokeless tobacco: Never Used   Substance and Sexual Activity    Alcohol use: No     Alcohol/week: 0.0 standard drinks     Frequency: Never     Binge frequency: Never    Drug use: No    Sexual activity: Not Currently   Lifestyle    Physical activity:     Days per week: 2 days     Minutes per session: 10 min    Stress: Very much   Relationships    Social connections:     Talks on phone: Not on file     Gets together: Not on file     Attends Sikh service: Not on file     Active member of club or organization: Not on file     Attends meetings of clubs or organizations: Not on file     Relationship status:    Other Topics Concern    Not on file   Social History Narrative    Pt lives with son       FAMILY HISTORY:  Family History   Problem Relation Age of Onset    Cancer Mother         unknown    Alzheimer's disease Father     Stroke Sister     No Known Problems Son     No Known Problems Son     No Known Problems Son     No Known Problems Son     Kidney disease Neg Hx        ALLERGIES AND MEDICATIONS: updated and reviewed.  Review of patient's allergies indicates:   Allergen Reactions    Epinephrine Anaphylaxis     Can cause  a Carcinoid Crisis    Contrast  media Hives, Itching and Swelling    Ibuprofen Hives, Itching and Swelling    Iodinated contrast media     Sulfa (sulfonamide antibiotics) Hives, Itching and Swelling     Current Outpatient Medications   Medication Sig Dispense Refill    bumetanide (BUMEX) 0.5 MG Tab Take 1 tablet (0.5 mg total) by mouth daily as needed. 30 tablet 0    cholestyramine (QUESTRAN) 4 gram packet       ciprofloxacin HCl (CIPRO) 500 MG tablet Take 1 tablet (500 mg total) by mouth 2 (two) times daily. 20 tablet 0    cloNIDine (CATAPRES) 0.1 MG tablet TAKE ONE TABLET BY MOUTH THREE TIMES DAILY AS NEEDED FOR signs of withdrawal  1    colestipoL (COLESTID) 1 gram Tab       CREON 24,000-76,000 -120,000 unit capsule       cyanocobalamin (VITAMIN B-12) 1000 MCG tablet Take 1 tablet (1,000 mcg total) by mouth once daily. 30 tablet 5    diclofenac sodium (VOLTAREN) 1 % Gel Apply the gel (2 g) to the affected area 4 times daily. Do not apply more than 8 g daily to any one affected joint of the upper extremities. 100 g 1    dicyclomine (BENTYL) 20 mg tablet Take 20 mg by mouth.      ferrous sulfate (FEOSOL) 325 mg (65 mg iron) Tab tablet TAKE ONE TABLET BY MOUTH THREE TIMES PER WEEK 12 tablet 2    losartan (COZAAR) 50 MG tablet Take 1 tablet (50 mg total) by mouth once daily. 90 tablet 1    metoprolol tartrate (LOPRESSOR) 50 MG tablet Take 1 tablet (50 mg total) by mouth 2 (two) times daily. 180 tablet 1    metroNIDAZOLE (FLAGYL) 500 MG tablet Take 1 tablet (500 mg total) by mouth 3 (three) times daily. 30 tablet 0    ondansetron (ZOFRAN) 4 MG tablet Take 1 tablet (4 mg total) by mouth every 8 (eight) hours as needed for Nausea. 30 tablet 0    ondansetron (ZOFRAN) 4 MG tablet Take 1 tablet (4 mg total) by mouth every 8 (eight) hours as needed for Nausea. 40 tablet 2    oxyCODONE (ROXICODONE) 15 MG Tab TK 1 T PO  Q 4 H prn  0    promethazine (PHENERGAN) 12.5 MG Tab Take 1 tablet (12.5 mg total) by mouth every 8 (eight) hours as  "needed (nausea). 6 tablet 0    psyllium (METAMUCIL SUGAR FREE) Powd Take 1 packet by mouth 3 (three) times daily. 90 each 2    SUPREP BOWEL PREP KIT 17.5-3.13-1.6 gram SolR       tamsulosin (FLOMAX) 0.4 mg Cap Take 1 capsule (0.4 mg total) by mouth every evening. 30 capsule 0    traMADol (ULTRAM) 50 mg tablet       traZODone (DESYREL) 50 MG tablet       trolamine salicylate (ASPERCREME) 10 % cream Apply topically as needed.      diphenoxylate-atropine 2.5-0.025 mg (LOMOTIL) 2.5-0.025 mg per tablet Take 1 tablet by mouth 4 (four) times daily as needed for Diarrhea (for episodic diarrhea). 30 tablet 0    hydrocortisone valerate (WEST-KAREN) 0.2 % ointment Apply topically 2 (two) times daily. for 7 days 60 g 0     No current facility-administered medications for this visit.          ROS:  Review of Systems   Musculoskeletal: Positive for neck pain (left sided).   Neurological: Positive for headaches (intermittent, left-sided).       Objective:       Physical Exam  Vitals:    03/02/20 0959   BP: 134/76   Pulse: 60   Resp: 18   Temp: 98.2 °F (36.8 °C)   TempSrc: Oral   SpO2: 99%   Weight: 81.7 kg (180 lb 1.9 oz)   Height: 5' 6" (1.676 m)    Body mass index is 29.07 kg/m².  Weight: 81.7 kg (180 lb 1.9 oz)   Height: 5' 6" (167.6 cm)   Physical Exam   Constitutional: She appears well-developed. No distress.   HENT:   Head: Normocephalic and atraumatic.   Mouth/Throat: Oropharynx is clear and moist.   Eyes: Pupils are equal, round, and reactive to light. Conjunctivae and EOM are normal.   Neck: Normal range of motion. Neck supple. No thyromegaly present.   Cardiovascular: Normal rate, normal heart sounds and intact distal pulses.   No murmur heard.  Pulmonary/Chest: Effort normal and breath sounds normal.   Musculoskeletal: She exhibits no edema or deformity.   Lymphadenopathy:     She has no cervical adenopathy.   Neurological: She is alert. No cranial nerve deficit.   No CN II-XI   Skin: Skin is warm and dry. "   Psychiatric: She has a normal mood and affect. Her behavior is normal.   Vitals reviewed.          Assessment:     1. Resistant hypertension    2. Metastatic carcinoid tumor    3. History of CVA (cerebrovascular accident)    4. Elevated hemoglobin A1c    5. Need for pneumococcal vaccination    6. Needs flu shot      Plan:     Patito was seen today for follow-up.    Diagnoses and all orders for this visit:    Resistant hypertension  Sent medication refill to patient's preferred pharmacy on file.  -     losartan (COZAAR) 100 MG tablet; Take 1 tablet (100 mg total) by mouth once daily.    Metastatic carcinoid tumor  Following up with Oncology    History of CVA (cerebrovascular accident)  Discussed continuing Plavix, statin therapy   To follow-up with Neurology    Elevated hemoglobin A1c  Labs as noted  -     Hemoglobin A1c; Future  -     Basic metabolic panel; Future    Need for pneumococcal vaccination  Risks and benefits of pneumococcal vaccination discussed - administered.  -     Pneumococcal Polysaccharide Vaccine (23 Valent) (SQ/IM)  -     Influenza - High Dose (65+) (PF) (IM)          Health Maintenance       Date Due Completion Date    TETANUS VACCINE 07/27/1970 ---    Pneumococcal Vaccine (65+ High/Highest Risk) (2 of 2 - PPSV23) 12/07/2017 10/12/2017    Influenza Vaccine (1) 09/01/2019 10/12/2018    Mammogram 04/18/2020 4/18/2018    Shingles Vaccine (1 of 2) 07/30/2020 (Originally 7/27/2002) ---    DEXA SCAN 09/19/2020 9/19/2017    Lipid Panel 06/26/2024 6/26/2019            Health Maintenance reviewed and addressed as per orders    No follow-ups on file.    The patient expressed understanding and no barriers to adherence were identified.     1. The patient indicates understanding of these issues and agrees with the plan. Brief care plan is updated and reviewed with the patient as applicable.     2. The patient is given an After Visit Summary that lists all medications with directions, allergies, orders  placed during this encounter and follow-up instructions.     3. I have reviewed the patient's medical information including past medical, family, and social history sections including the medications and allergies.     4. We discussed the patient's current medications. I reconciled the patient's medication list and prepared and supplied needed refills.       Pantera Rosen MD  Internal Medicine-Pediatrics

## 2020-03-05 ENCOUNTER — TELEPHONE (OUTPATIENT)
Dept: HEMATOLOGY/ONCOLOGY | Facility: CLINIC | Age: 70
End: 2020-03-05

## 2020-03-05 ENCOUNTER — TELEPHONE (OUTPATIENT)
Dept: NEUROLOGY | Facility: HOSPITAL | Age: 70
End: 2020-03-05

## 2020-03-05 ENCOUNTER — DOCUMENTATION ONLY (OUTPATIENT)
Dept: HEMATOLOGY/ONCOLOGY | Facility: CLINIC | Age: 70
End: 2020-03-05

## 2020-03-05 NOTE — TELEPHONE ENCOUNTER
Spoke with pt. She had a CVA as in the hospital.we rescheduled her appt with Dr. Perry because she has not started her lanreotide injection due to being in the hospital with a stroke. I told her that Dr. Gonzales Bishop was the doctor that she needed to make an appointment and start her injections. Pt verbalized understanding

## 2020-03-05 NOTE — TELEPHONE ENCOUNTER
----- Message from Tamara Dhillon sent at 3/5/2020  2:39 PM CST -----  Contact: SELF [phone]  BRIAN - Patient is calling to reschedule her appointment. Please call

## 2020-03-06 ENCOUNTER — OUTPATIENT CASE MANAGEMENT (OUTPATIENT)
Dept: ADMINISTRATIVE | Facility: OTHER | Age: 70
End: 2020-03-06

## 2020-03-06 NOTE — PROGRESS NOTES
Outpatient Care Management  Plan of Care Follow Up Visit    Patient: Patito Domingo  MRN: [id number]  Date of Service: 03/06/2020  Completed by: Padmini Clements RN  Referral Date: 11/13/2019  Program: Case Management (High Risk)    Reason for Visit   Patient presents with    Update Plan Of Care       Brief Summary: Pt reports that she had a stroke while in [facility]. Pt reports that she has some problems with her vision. Pt reports that she already had some problems with weakness to her lower extremities. Pt reports that she is applying more wt to the walker when she walks. Educated pt on using the walker at all times to prevent falls. Pt reports that she was started on three new medications. Reports that she brought the medicines to her appt with PCP. Pt was started on Lipitor, Cozaar, and Plavix. Pt reports that she has a blood pressure cuff at home. Informed pt that she is eligible for the digital medicine for BP. Pt reports that she uses a smartphone. Educated pt on the process of getting started with the digital medicine program. Message sent to PCP's staff to notify of pt's interest in the digital medicine. KRAMES materials mailed to pt on HTN and stroke.Pt reports that the diarrhea is better since her recent hospitalization. Pt reports that she took the flu and pneumonia vaccines on the last PCP visit.  Pt reports that she did not want to start with therapy due to being treatment for the tumor next week. Pt reports that she will get the chemo shot once a month.      Patient Summary     Involvement of Care:  Do I have permission to speak with other family members about your care?  yes    Patient Reported Labs & Vitals:  1.  Any Patient Reported Labs & Vitals?     2.  Patient Reported Blood Pressure:     3.  Patient Reported Pulse:     4.  Patient Reported Weight (Kg):     5.  Patient Reported Blood Glucose (mg/dl):       Medical History:  Reviewed medical history with patient and/or  caregiver    Social History:  Reviewed social history with patient and/or caregiver    Clinical Assessment     Reviewed and provided basic information on available community resources for mental health, transportation, wellness resources, and palliative care programs with patient and/or caregiver.    Complex Care Plan     Care plan was discussed and completed today with input from patient and/or caregiver.    Goals      Patient/caregiver will accept life style changes to manage and improve coping due to Metastatic Carcinoid tumor prior to discharge from OPCM. - Priority: High      Overall Time to Completion  2 months from 11/20/2019     OPCM Identified Patient Barriers:  Depression: LSMW; Referred        OPCM Identified Disease Education Barriers:  Hypertension:  Care plan created  Falls: Care plan created          Short Term Goals  Patient/caregiver will verbalize 2 risk factors of Ineffective coping within 1 month.  Interventions   · Assess patient's ability to perform ADLs.  · Assess type of communication barriers.  · Collaborate with Physician as appropriate to meet patient needs.  · Provide contact information for 24 hour Crisis Line number- COPE LINE.  · Recognize and provide educational material (REMEDIOS).  · Refer to Outpatient Case Management Social Worker.     Status  · Met12/4/19, 12/12/19      Patient/caregiver will verbalize 2 strategies to effectivecoping strategies within 1 month.  Interventions   · Assess type of communication barriers. 1/30/2020  · Collaborate with Physician as appropriate to meet patient needs.  · Facilitate referral for counseling.  · Provide contact information for 24 hour Crisis Line number- COPE LINE.1/30/2020  · Recognize and provide educational material (KRAMES).  · Refer to Outpatient Case Management Social Worker.  Patient/Caregiver agrees to pt will verbalize willingness to consider counseling for coping by  2 weeks.  · Patient/Caregiver agrees to OPCM follow up within 2  weeks to assess progress to goal.   ·      Status  · Partially met           Clinical Reference Documents Added to Patient Instructions       Document    CANCER, RESOURCES FOR PEOPLE WITH (ENGLISH)    FALLS IN THE HOME, PREVENTING (ENGLISH)    FALLS, PREVENTING, MAKE YOUR HEALTH A PRIORITY (ENGLISH)    ONCOLOGY: COMMUNICATING WITH OTHERS  (ENGLISH)    WEAKNESS (UNCERTAIN CAUSE) (ENGLISH)             Patient/caregiver will accept life style changes to manage and improve Diarrhea prior to discharge from OPCM. - Priority: High      Overall Time to Completion  2 weeks from 01/16/2020     OPCM Identified Patient Needs:    Depression: LSMW; Referred        OPCM Identified Disease Education Barriers:  Hypertension:  Care plan created  Falls: Care plan created       Short Term Goals  Patient/caregiver will verbalize 2 risk factors of Diarrhea within 2 weeks.  Interventions   · Assess patient's ability to perform ADLs.1/30/2020  · Collaborate with Physician as appropriate to meet patient needs.1/16/2020  · Recognize and provide educational material (KRAMES).     Status  · Met 1/30/2020      Patient/caregiver will verbalize 2 ways of preventing complications due to disease process within 2 weeks.  Interventions   · Assess patient's ability to perform ADLs.1/30/2020  · Collaborate with Physician as appropriate to meet patient needs.  · Encourage Dietary Compliance.  · Encourage Medication Compliance.  · Recognize and provide educational material (KRAMES).     Status  · Met 1/30/2020             Patient/caregiver will accept life style changes to manage and improve risk of Falls prior to discharge from OPCM. - Priority: High      Overall Time to Completion  2 months from 11/20/2019     OPCM Identified Patient Barriers:    Depression: LSMW; Referred        OPCM Identified Disease Education Barriers:  Hypertension:  Care plan created  Falls: Care plan created                     Short Term Goals  Patient/caregiver will  verbalize 2 risk factors of Falls within 1 month.  Interventions   · Assess patient's ability to perform ADLs. 3/6/2020  · Collaborate with Physician as appropriate to meet patient needs.  · Discuss alternate Levels of Care with patient/caregiver. 3/6/2020  · Encourage Exercise.  · Facilitate Home Health Services.  · Facilitate referral to Outpatient Rehab.Deferred  · Recognize and provide educational material (REMEDIOS).3/6/2020  · Refer to Outpatient Case Management Social Worker.  ·   Patient/Caregiver agrees to utilize walker when ambulating by  1 week.  · Patient/Caregiver agrees to OPCM follow up within 1 week to assess progress to goal. 3/6/2020  ·      Status  · Partially met      Patient/caregiver will verbalize 2 strategies to rest breaks and manage fatigue within 1 month.  Interventions   · Assess patient's ability to perform ADLs.  · Collaborate with Physician as appropriate to meet patient needs.  · Recognize and provide educational material (REMEDIOS).3/6/2020     Status  · Partially met           Clinical Reference Documents Added to Patient Instructions       Document    CANCER, RESOURCES FOR PEOPLE WITH (ENGLISH)    FALLS IN THE HOME, PREVENTING (ENGLISH)    FALLS, PREVENTING, MAKE YOUR HEALTH A PRIORITY (ENGLISH)    ONCOLOGY: COMMUNICATING WITH OTHERS  (ENGLISH)    WEAKNESS (UNCERTAIN CAUSE) (ENGLISH)                  Patient Instructions     Instructions were provided via the [email or web address] patient resources and are available for the patient to view on the patient portal.    Next Steps: Educate pt on s/s of HTN and stroke    Follow up in about 1 week (around 3/13/2020) for RN Follow.      Todays OPCM Self-Management Care Plan was developed with the patients/caregivers input and was based on identified barriers from todays assessment.  Goals were written today with the patient/caregiver and the patient has agreed to work towards these goals to improve his/her overall well-being. Patient verbalized  understanding of the care plan, goals, and all of today's instructions. Encouraged patient/caregiver to communicate with his/her physician and health care team about health conditions and the treatment plan.  Provided my contact information today and encouraged patient/caregiver to call me with any questions as needed.

## 2020-03-07 ENCOUNTER — TELEPHONE (OUTPATIENT)
Dept: INTERNAL MEDICINE | Facility: CLINIC | Age: 70
End: 2020-03-07

## 2020-03-07 NOTE — TELEPHONE ENCOUNTER
----- Message from Padmini Clements RN sent at 3/6/2020  2:12 PM CST -----  Contact: PANCHITO Hong this is Padmini with [facility]. Pt reports that she is interested in starting the Digital Medicine for BP.    Please advise  Thank you

## 2020-03-09 ENCOUNTER — TELEPHONE (OUTPATIENT)
Dept: FAMILY MEDICINE | Facility: CLINIC | Age: 70
End: 2020-03-09

## 2020-03-09 NOTE — TELEPHONE ENCOUNTER
Received notification regarding patient's interest in Digital HTN program - We can have patient sign up for [facility] at [facility] - please let patient know that she can come in during business hours from 8 AM - 12 PM or 1 PM - 4 PM to help set up and then we can get her enrolled in the program

## 2020-03-10 DIAGNOSIS — K52.9 CHRONIC DIARRHEA: ICD-10-CM

## 2020-03-10 NOTE — TELEPHONE ENCOUNTER
----- Message from Ghislaine Barrett sent at 3/10/2020  3:20 PM CDT -----  Contact: Self   Refill : diphenoxylate-atropine 2.5-0.025 mg (LOMOTIL) 2.5-0.025 mg per tablet      [facility] - [facility] - [phone] [facility]  [place] [address]  Phone: [phone] Fax: [phone]

## 2020-03-11 RX ORDER — DIPHENOXYLATE HYDROCHLORIDE AND ATROPINE SULFATE 2.5; .025 MG/1; MG/1
1 TABLET ORAL 4 TIMES DAILY PRN
Qty: 30 TABLET | Refills: 0 | Status: SHIPPED | OUTPATIENT
Start: 2020-03-11 | End: 2020-04-06

## 2020-03-12 ENCOUNTER — OUTPATIENT CASE MANAGEMENT (OUTPATIENT)
Dept: ADMINISTRATIVE | Facility: OTHER | Age: 70
End: 2020-03-12

## 2020-03-12 NOTE — PROGRESS NOTES
Outpatient Care Management  Plan of Care Follow Up Visit    Patient: Patito Domingo  MRN: [id number]  Date of Service: 03/12/2020  Completed by: Padmini Clements RN  Referral Date: 11/13/2019  Program: Case Management (High Risk)    No chief complaint on file.      Brief Summary: Pt reports that she was feeling a little down today due to having some pain to the right side of her back from the kidneys stones. Pt reports that she also has some tingling in her fingers on the right hand. Pt reports that she will consider counseling and rehab when she is finished with the chemo. Pt reports that she will receive her first Chemo shot on tomorrow. Pt reports that she is feeling a little nervous. Educated pt on taking slow deep breaths to help with calming herself when she feels nervous. Pt reports that she is able to perform her ADL's at this time. Pt does not report any recent falls. Educated pt to ambulate with her walker to prevent falls. Pt verbalized understanding.     Patient Summary     Involvement of Care:  Do I have permission to speak with other family members about your care?  yes    Patient Reported Labs & Vitals:  1.  Any Patient Reported Labs & Vitals?     2.  Patient Reported Blood Pressure:     3.  Patient Reported Pulse:     4.  Patient Reported Weight (Kg):     5.  Patient Reported Blood Glucose (mg/dl):       Medical History:  Reviewed medical history with patient and/or caregiver    Social History:  Reviewed social history with patient and/or caregiver    Clinical Assessment     Reviewed and provided basic information on available community resources for mental health, transportation, wellness resources, and palliative care programs with patient and/or caregiver.    Complex Care Plan     Care plan was discussed and completed today with input from patient and/or caregiver.    Goals      Patient/caregiver will accept life style changes to manage and improve coping due to Metastatic Carcinoid tumor  prior to discharge from OPCM. - Priority: High      Overall Time to Completion  2 months from 11/20/2019     OPCM Identified Patient Barriers:  Depression: LSMW; Referred        OPCM Identified Disease Education Barriers:  Hypertension:  Care plan created  Falls: Care plan created          Short Term Goals  Patient/caregiver will verbalize 2 risk factors of Ineffective coping within 1 month.  Interventions   · Assess patient's ability to perform ADLs.  · Assess type of communication barriers.  · Collaborate with Physician as appropriate to meet patient needs.  · Provide contact information for 24 hour Crisis Line number- COPE LINE.  · Recognize and provide educational material (REMEDIOS).  · Refer to Outpatient Case Management Social Worker.     Status  · Met12/4/19, 12/12/19      Patient/caregiver will verbalize 2 strategies to effectivecoping strategies within 1 month.  Interventions   · Assess type of communication barriers. 1/30/2020  · Collaborate with Physician as appropriate to meet patient needs.  · Facilitate referral for counseling.  · Provide contact information for 24 hour Crisis Line number- COPE LINE.1/30/2020  · Recognize and provide educational material (REMEDIOS).3/12/2020  · Refer to Outpatient Case Management Social Worker.  Patient/Caregiver agrees to pt will verbalize willingness to consider counseling for coping by  2 weeks.  · Patient/Caregiver agrees to OPCM follow up within 2 weeks to assess progress to goal. 3/12/2020  ·      Status  · Partially met           Clinical Reference Documents Added to Patient Instructions       Document    CANCER, RESOURCES FOR PEOPLE WITH (ENGLISH)    FALLS IN THE HOME, PREVENTING (ENGLISH)    FALLS, PREVENTING, MAKE YOUR HEALTH A PRIORITY (ENGLISH)    ONCOLOGY: COMMUNICATING WITH OTHERS  (ENGLISH)    WEAKNESS (UNCERTAIN CAUSE) (ENGLISH)             Patient/caregiver will accept life style changes to manage and improve Diarrhea prior to discharge from OPCM. -  Priority: High      Overall Time to Completion  2 weeks from 01/16/2020     OPCM Identified Patient Needs:    Depression: LSMW; Referred        OPCM Identified Disease Education Barriers:  Hypertension:  Care plan created  Falls: Care plan created       Short Term Goals  Patient/caregiver will verbalize 2 risk factors of Diarrhea within 2 weeks.  Interventions   · Assess patient's ability to perform ADLs.1/30/2020  · Collaborate with Physician as appropriate to meet patient needs.1/16/2020  · Recognize and provide educational material (REMEDIOS).     Status  · Met 1/30/2020      Patient/caregiver will verbalize 2 ways of preventing complications due to disease process within 2 weeks.  Interventions   · Assess patient's ability to perform ADLs.1/30/2020  · Collaborate with Physician as appropriate to meet patient needs.  · Encourage Dietary Compliance.  · Encourage Medication Compliance.  · Recognize and provide educational material (REMEDIOS).     Status  · Met 1/30/2020             Patient/caregiver will accept life style changes to manage and improve risk of Falls prior to discharge from OPCM. - Priority: High      Overall Time to Completion  2 months from 11/20/2019     OPCM Identified Patient Barriers:    Depression: LSMW; Referred        OPCM Identified Disease Education Barriers:  Hypertension:  Care plan created  Falls: Care plan created                     Short Term Goals  Patient/caregiver will verbalize 2 risk factors of Falls within 1 month.  Interventions   · Assess patient's ability to perform ADLs. 3/6/2020  · Collaborate with Physician as appropriate to meet patient needs.  · Discuss alternate Levels of Care with patient/caregiver. 3/6/2020  · Encourage Exercise.  · Facilitate referral to Outpatient Rehab.Deferred  · Recognize and provide educational material (REMEDIOS).3/6/2020  Patient/Caregiver agrees to utilize walker when ambulating by  1 week.  · Patient/Caregiver agrees to OPCM follow up within 1  week to assess progress to goal. 3/6/2020, 3/12/2020  ·      Status  · Met 3/12/2020      Patient/caregiver will verbalize 2 strategies to rest breaks and manage fatigue within 1 month.  Interventions   · Assess patient's ability to perform ADLs.3/12/2020  · Collaborate with Physician as appropriate to meet patient needs.  · Recognize and provide educational material (REMEDIOS).3/6/2020     Status  · Met 3/12/2020           Clinical Reference Documents Added to Patient Instructions       Document    CANCER, RESOURCES FOR PEOPLE WITH (ENGLISH)    FALLS IN THE HOME, PREVENTING (ENGLISH)    FALLS, PREVENTING, MAKE YOUR HEALTH A PRIORITY (ENGLISH)    ONCOLOGY: COMMUNICATING WITH OTHERS  (ENGLISH)    WEAKNESS (UNCERTAIN CAUSE) (ENGLISH)                  Patient Instructions     Instructions were provided via the [employer] patient resources and are available for the patient to view on the patient portal.    Next Steps: Educate pt on coping     Follow up in about 1 week (around 3/19/2020) for RN Follow.      Todays OPCM Self-Management Care Plan was developed with the patients/caregivers input and was based on identified barriers from todays assessment.  Goals were written today with the patient/caregiver and the patient has agreed to work towards these goals to improve his/her overall well-being. Patient verbalized understanding of the care plan, goals, and all of today's instructions. Encouraged patient/caregiver to communicate with his/her physician and health care team about health conditions and the treatment plan.  Provided my contact information today and encouraged patient/caregiver to call me with any questions as needed.

## 2020-03-13 ENCOUNTER — OFFICE VISIT (OUTPATIENT)
Dept: HEMATOLOGY/ONCOLOGY | Facility: CLINIC | Age: 70
End: 2020-03-13
Payer: MEDICARE

## 2020-03-13 VITALS
BODY MASS INDEX: 28.14 KG/M2 | HEIGHT: 66 IN | OXYGEN SATURATION: 97 % | HEART RATE: 60 BPM | SYSTOLIC BLOOD PRESSURE: 173 MMHG | DIASTOLIC BLOOD PRESSURE: 74 MMHG | WEIGHT: 175.06 LBS | TEMPERATURE: 99 F

## 2020-03-13 DIAGNOSIS — I10 HYPERTENSION, UNSPECIFIED TYPE: ICD-10-CM

## 2020-03-13 DIAGNOSIS — D49.7 NEOPLASM OF UNSPECIFIED BEHAVIOR OF ENDOCRINE GLANDS AND OTHER PARTS OF NERVOUS SYSTEM: ICD-10-CM

## 2020-03-13 DIAGNOSIS — E34.0 CARCINOID SYNDROME: ICD-10-CM

## 2020-03-13 DIAGNOSIS — Z86.73 H/O: CVA (CEREBROVASCULAR ACCIDENT): ICD-10-CM

## 2020-03-13 DIAGNOSIS — C7B.00 METASTATIC CARCINOID TUMOR: Primary | Chronic | ICD-10-CM

## 2020-03-13 PROCEDURE — 1125F PR PAIN SEVERITY QUANTIFIED, PAIN PRESENT: ICD-10-PCS | Mod: HCNC,S$GLB,, | Performed by: INTERNAL MEDICINE

## 2020-03-13 PROCEDURE — 99204 PR OFFICE/OUTPT VISIT, NEW, LEVL IV, 45-59 MIN: ICD-10-PCS | Mod: HCNC,S$GLB,, | Performed by: INTERNAL MEDICINE

## 2020-03-13 PROCEDURE — 99999 PR PBB SHADOW E&M-EST. PATIENT-LVL V: ICD-10-PCS | Mod: PBBFAC,HCNC,, | Performed by: INTERNAL MEDICINE

## 2020-03-13 PROCEDURE — 1101F PR PT FALLS ASSESS DOC 0-1 FALLS W/OUT INJ PAST YR: ICD-10-PCS | Mod: HCNC,CPTII,S$GLB, | Performed by: INTERNAL MEDICINE

## 2020-03-13 PROCEDURE — 1101F PT FALLS ASSESS-DOCD LE1/YR: CPT | Mod: HCNC,CPTII,S$GLB, | Performed by: INTERNAL MEDICINE

## 2020-03-13 PROCEDURE — 99999 PR PBB SHADOW E&M-EST. PATIENT-LVL V: CPT | Mod: PBBFAC,HCNC,, | Performed by: INTERNAL MEDICINE

## 2020-03-13 PROCEDURE — 3077F PR MOST RECENT SYSTOLIC BLOOD PRESSURE >= 140 MM HG: ICD-10-PCS | Mod: HCNC,CPTII,S$GLB, | Performed by: INTERNAL MEDICINE

## 2020-03-13 PROCEDURE — 3078F DIAST BP <80 MM HG: CPT | Mod: HCNC,CPTII,S$GLB, | Performed by: INTERNAL MEDICINE

## 2020-03-13 PROCEDURE — 1159F PR MEDICATION LIST DOCUMENTED IN MEDICAL RECORD: ICD-10-PCS | Mod: HCNC,S$GLB,, | Performed by: INTERNAL MEDICINE

## 2020-03-13 PROCEDURE — 1159F MED LIST DOCD IN RCRD: CPT | Mod: HCNC,S$GLB,, | Performed by: INTERNAL MEDICINE

## 2020-03-13 PROCEDURE — 3077F SYST BP >= 140 MM HG: CPT | Mod: HCNC,CPTII,S$GLB, | Performed by: INTERNAL MEDICINE

## 2020-03-13 PROCEDURE — 1125F AMNT PAIN NOTED PAIN PRSNT: CPT | Mod: HCNC,S$GLB,, | Performed by: INTERNAL MEDICINE

## 2020-03-13 PROCEDURE — 99204 OFFICE O/P NEW MOD 45 MIN: CPT | Mod: HCNC,S$GLB,, | Performed by: INTERNAL MEDICINE

## 2020-03-13 PROCEDURE — 3078F PR MOST RECENT DIASTOLIC BLOOD PRESSURE < 80 MM HG: ICD-10-PCS | Mod: HCNC,CPTII,S$GLB, | Performed by: INTERNAL MEDICINE

## 2020-03-13 NOTE — PLAN OF CARE
DISCONTINUE ON PATHWAY REGIMEN - Neuroendocrine    No Medical Intervention - Off Treatment.    REASON: Other Reason  PRIOR TREATMENT: Off Treatment    START OFF PATHWAY REGIMEN - Neuroendocrine            Lanreotide (Somatuline(R) Depot Injection)     **Always confirm dose/schedule in your pharmacy ordering system**    Patient Characteristics:  GI Tract, Well-differentiated, Low-grade or Intermediate-grade, Unresectable,   Treatment Indicated, First Line  Tumor Location: GI Tract  Tumor Grade: Low-grade  Line of Therapy: First Line    Intent of Therapy:  Non-Curative / Palliative Intent, Discussed with Patient

## 2020-03-13 NOTE — PLAN OF CARE
START ON PATHWAY REGIMEN - Neuroendocrine    NEOS02        Lanreotide (Somatuline(R) Depot Injection)     **Always confirm dose/schedule in your pharmacy ordering system**    Patient Characteristics:  GI Tract, Well-differentiated, Low-grade or Intermediate-grade, Unresectable,   Treatment Indicated, First Line  Tumor Location: GI Tract  Tumor Grade: Low-grade  Line of Therapy: First Line    Intent of Therapy:  Non-Curative / Palliative Intent, Discussed with Patient

## 2020-03-13 NOTE — PLAN OF CARE
DISCONTINUE ON PATHWAY REGIMEN - Neuroendocrine    No Medical Intervention - Off Treatment.    PRIOR TREATMENT: Off Treatment    Neuroendocrine - No Medical Intervention - Off Treatment.    Patient Characteristics:  GI Tract, Well-differentiated, Low-grade or Intermediate-grade, Unresectable,   Treatment Indicated  Tumor Location: GI Tract  Tumor Grade: Low-grade  Line of Therapy: First Line

## 2020-03-13 NOTE — LETTER
March 13, 2020      Chris Herbert MD  [address]  [address]  [place] [address]           [facility]  [address] [facility] [address]  [place] [address]  Phone: [phone]          Patient: Patito Domingo   MR Number: [id number]   YOB: 1950   Date of Visit: 3/13/2020       Dear Dr. Chris Herbert:    Thank you for referring Patito Domingo to me for evaluation. Attached you will find relevant portions of my assessment and plan of care.    If you have questions, please do not hesitate to call me. I look forward to following Patito Domingo along with you.    Sincerely,    Gonzales Bishop MD    Enclosure  CC:  No Recipients    If you would like to receive this communication electronically, please contact [email or web address] or [phone] to request more information on [facility] access.    For providers and/or their staff who would like to refer a patient to [facility], please contact us through our one-stop-shop provider referral line, [facility], at [phone].    If you feel you have received this communication in error or would no longer like to receive these types of communications, please e-mail [email or web address]

## 2020-03-13 NOTE — PLAN OF CARE
DISCONTINUE ON PATHWAY REGIMEN - Neuroendocrine    NEOS02        Lanreotide (Somatuline(R) Depot Injection)     **Always confirm dose/schedule in your pharmacy ordering system**    REASON: Other Reason  PRIOR TREATMENT: NEOS02: Lanreotide 120 mg q28 Days Until Disease Progression or   Toxicity  TREATMENT RESPONSE: Unable to Evaluate    Neuroendocrine - No Medical Intervention - Off Treatment.    Patient Characteristics:  GI Tract, Well-differentiated, Low-grade or Intermediate-grade, Unresectable,   Treatment Indicated  Tumor Location: GI Tract  Tumor Grade: Low-grade  Line of Therapy: First Line

## 2020-03-13 NOTE — Clinical Note
Progress Notes by Jose Fuentes MD at 02/07/18 11:18 AM     Author:  Jose Fuentes MD Service:  (none) Author Type:  Physician     Filed:  02/09/18 09:51 AM Encounter Date:  2/7/2018 Status:  Signed     :  Jose Fuentes MD (Physician)            Susana is a 54 year old female who presents with complaints of left lower quadrant abdominal pain.  The pain has been present for[GB1.1T] 1-2 days[GB1.1M] .  She describes the pain as  A dull ache to sharp cramps at times  History of prior diverticultis[GB1.1T] Yes - last year[GB1.1M]  Recent consumption of popcorn, nuts, corn, seeds?[GB1.1T]  No?[GB1.1M]  Prior Colonoscopy[GB1.1T] No[GB1.1M]    Prior Abdominal Surgery:[GB1.1T] No[GB1.1M]    Last BM[GB1.1T] recent2[GB1.1M]    Denies hematemesis, melena, bright red blood per rectum  Martin not recall passing gas    Nausea:[GB1.1T] No?[GB1.1M]  Fevers:[GB1.1T] No[GB1.1M]  Diarrhea:[GB1.1T] No[GB1.1M]  Urinary Symptoms:[GB1.1T] No/[GB1.1M]  Recent Travel:[GB1.1T] No[GB1.1M]  Recent Antibiotic Use:[GB1.1T] No[GB1.1M]  Unusual Food Intake:[GB1.1T] No[GB1.1M]  Sick Contacts:[GB1.1T] No[GB1.1M]      ROS  Resp:denies cough, wheeze, shortness of breath  CV: denies chest pains, palpitations  :denies dysuria, discharge, hematuria    PMH:  Patient Active Problem List    Diagnosis    • PREG STATE, INCIDENTAL   • LUMP OR MASS IN BREAST   • LUMP OR MASS IN BREAST   • NORMAL DELIVERY   • DELIVER-SINGLE LIVEBORN     All: Review of patient's allergies indicates no known allergies.  Meds:  Current outpatient prescriptions prior to encounter       Medication  Sig Dispense Refill   • fluoxetine (PROZAC) 20 MG Cap TK 2 CS PO D  0   • losartan (COZAAR) 100 MG tablet TK 1 T PO QD  8       OBJECTIVE:  BP (!) 167/96  Pulse 70  Temp 97.8 °F (36.6 °C) (Tympanic)   Resp 20  SpO2 99%  Gen:in mild distress, non toxic  HEENT: no conjunctival injection or scleral icterus         no pharyngeal erythema         mucous membranes moist  Neck:  supple,  The patient will need insurance approval for lanreotide.  She will need a CBC, CMP, TSH, T4 the day prior to getting treatment.  She will needs to follow up with me in clinic before the second dose which is 4 weeks after the first with CBC and CMP prior to the appt. no masses or LAD  Lungs ; CTA  CVS- S1S2 RRR no rubs murmur or gallop      Abd: bowel sounds diminished, distended         Tenderness:LLQ         Masses:none         HSM:none         CVA Tenderness: none    Lower extremities- warm, good distal pulses      ASSESSMENT:/PLAN:    Abdominal Pain  Suspect acute diverticulitis[GB1.1T]  U/A neg.[GB1.2M]  Trial of oral antibiotics-cipro 500mg  bid for 10d plus flagyl[GB1.1T] 250[GB1.1M]mg tid for 10d, clear liquid, low residue diet.  To the emergency room for any significant worsening of pain or symptom change.    Current Outpatient Prescriptions    Medication    • ciprofloxacin (CIPRO) 500 MG tablet   • MetroNIDAZOLE (FLAGYL) 250 MG tablet   • fluoxetine (PROZAC) 20 MG Cap   • losartan (COZAAR) 100 MG tablet   . Side effects of all medications were discussed.  Patient is instructed to follow up in 2-3 days or as needed.  Patient is to go to the emergency room for any significant change or worsening.  Patient was discharged in a stable condition and was in agreement with the plan.  No further questions.}    Electronically Signed by:    Jose Fuentes MD , 2/7/2018[GB1.1T]        Revision History        User Key Date/Time User Provider Type Action    > GB1.2 02/09/18 09:51 AM Jose Fuentes MD Physician Sign     GB1.1 02/07/18 11:18 AM Jose Fuentes MD Physician     M - Manual, T - Template

## 2020-03-13 NOTE — PROGRESS NOTES
PATIENT: Patito Domingo  MRN: [id number]  DATE: 3/13/2020      Diagnosis:   1. Metastatic carcinoid tumor    2. Neoplasm of unspecified behavior of endocrine glands and other parts of nervous system     3. Carcinoid syndrome    4. Hypertension, unspecified type    5. H/O: CVA (cerebrovascular accident)        Chief Complaint: new patient (neuroendocrine/nata cell )        Subjective:    Initial History: Ms. Domingo is a 69 y.o. female with HTN, kidney stones, arthritis, h/o CVA who presents to establish care for metastatic neuroendocrine tumor with carcinoid syndrome.  The patient has metastatic neuroendocrine tumor with carciinoid diagnosed 9/2014 with metastases to the liver at presentation.  Pathology form liver biopsy 12/02/14 showed metastatic well-differentiated neuroendocrine tumor, with Ki-67 proliferation index: less than 1%, and mitotic index: 0/10 high-power fields.  The patient has previously been on Lanreotide and states it helped with her diarrhea in the past.  She has also had prior therapy with conventional chemoembolization of the right hepatic artery utilizing 30 mg aqueous doxorubicin in ~7 mL lipiodol followed by 4/5th vial 100-300 micron Embosphere Microspheres on 11/30/18.  She has most recently been under the care of Dr. Chris Herbert at [facility] last seen on 1/23/20 with plans on performing an additional chemoembolization and restarting Lanreotide.  The patient's last Octreotide scan on 1219/20 showed abnormal tracer uptake in the liver, primarily in the left lobe, and in a right lower quadrant partially calcified mesenteric mass.  Recently the patient presented to [facility] on 2/12/20 with abdominal pain and intractable nausea and vomiting.  She was found to have a UTI and started on rocephin.  On 02/14/2020, patient had acute onset of dizziness, hypertensive urgency, and physical exam consistent with possible CVA. An MRI on 2/14/20 showed a linear focus of acute/subacute  ischemia in the posterior aspect of the josey, just left of the midline; evidence of moderate patchy bilateral chronic white matter ischemia; and a few small foci of susceptibility artifact in both cerebral hemispheres, in the josey, and in both cerebellar hemispheres.  The patient was seen by neurology and started on plavix with follow up as an outpatient.  Currently the patient states she has chronic diarrhea with 3-4 BM's a day.  She also complains of abdominal pain on occasion which she attributes to her cancer.  She states she has some visual difficulty after the stroke and occasional lightheadedness.  The patient denies CP, SOB, N/V, fever, chills, night sweats, weight loss, new lumps or bumps, easy bruising or bleeding.    Past Medical History:   Past Medical History:   Diagnosis Date    Arthritis     Cataract     Colon cancer     Encounter for blood transfusion     HTN (hypertension)     Kidney stones     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     colon    Pyelonephritis, acute     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)        Past Surgical HIstory:   Past Surgical History:   Procedure Laterality Date    CATARACT EXTRACTION Left 10/2017     SECTION      CHOLECYSTECTOMY      COLON SURGERY      cystoscope      CYSTOSCOPY W/ RETROGRADES Right 10/10/2019    Procedure: CYSTOSCOPY, WITH RETROGRADE PYELOGRAM;  Surgeon: Gen Isbell MD;  [facility];  Service: Urology;  Laterality: Right;    EYE SURGERY      HYSTERECTOMY  1996    LITHOTRIPSY      LIVER BIOPSY      carcinoid    URETEROSCOPY Right 10/10/2019    Procedure: URETEROSCOPY;  Surgeon: Gen Isbell MD;  [facility];  Service: Urology;  Laterality: Right;    UTERINE FIBROID SURGERY         Family History:   Family History   Problem Relation Age of Onset    Cancer Mother         unknown    Alzheimer's disease Father     Stroke Sister     No Known Problems Son     No Known  Problems Son     No Known Problems Son     No Known Problems Son     Kidney disease Neg Hx        Social History:  reports that she has never smoked. She has never used smokeless tobacco. She reports that she does not drink alcohol or use drugs.    Allergies:  Review of patient's allergies indicates:   Allergen Reactions    Epinephrine Anaphylaxis     Can cause  a Carcinoid Crisis    Contrast media Hives, Itching and Swelling    Ibuprofen Hives, Itching and Swelling    Iodinated contrast media     Sulfa (sulfonamide antibiotics) Hives, Itching and Swelling       Medications:  Current Outpatient Medications   Medication Sig Dispense Refill    atorvastatin (LIPITOR) 80 MG tablet Take 1 tablet (80 mg total) by mouth every evening. 90 tablet 3    bumetanide (BUMEX) 0.5 MG Tab Take 1 tablet (0.5 mg total) by mouth daily as needed. 30 tablet 0    cholestyramine-aspartame (QUESTRAN LIGHT) 4 gram PwPk Take 8 g by mouth once daily.      cloNIDine (CATAPRES) 0.1 MG tablet TAKE ONE TABLET BY MOUTH THREE TIMES DAILY AS NEEDED FOR signs of withdrawal  1    clopidogreL (PLAVIX) 75 mg tablet Take 1 tablet (75 mg total) by mouth once daily. 90 tablet 1    colestipoL (COLESTID) 1 gram Tab       CREON 24,000-76,000 -120,000 unit capsule       cyanocobalamin (VITAMIN B-12) 1000 MCG tablet Take 1 tablet (1,000 mcg total) by mouth once daily. 30 tablet 5    diclofenac sodium (VOLTAREN) 1 % Gel Apply the gel (2 g) to the affected area 4 times daily. Do not apply more than 8 g daily to any one affected joint of the upper extremities. 100 g 1    dicyclomine (BENTYL) 20 mg tablet Take 20 mg by mouth.      diphenoxylate-atropine 2.5-0.025 mg (LOMOTIL) 2.5-0.025 mg per tablet Take 1 tablet by mouth 4 (four) times daily as needed.      ferrous sulfate (FEOSOL) 325 mg (65 mg iron) Tab tablet TAKE ONE TABLET BY MOUTH THREE TIMES PER WEEK 12 tablet 2    hydrocortisone valerate (WEST-KAREN) 0.2 % ointment Apply topically 1 gram  2 (two) times daily. for 7 days      losartan (COZAAR) 100 MG tablet Take 1 tablet (100 mg total) by mouth once daily. 90 tablet 1    metoprolol tartrate (LOPRESSOR) 50 MG tablet Take 1 tablet (50 mg total) by mouth 2 (two) times daily. 180 tablet 1    ondansetron (ZOFRAN) 4 MG tablet Take 1 tablet (4 mg total) by mouth every 8 (eight) hours as needed for Nausea. 30 tablet 0    oxyCODONE (ROXICODONE) 15 MG Tab TK 1 T PO  Q 4 H prn  0    pantoprazole (PROTONIX) 40 mg GrPS Take 40 mg by mouth once daily.      psyllium (METAMUCIL SUGAR FREE) Powd Take 1 packet by mouth 3 (three) times daily. 90 each 2    SUPREP BOWEL PREP KIT 17.5-3.13-1.6 gram SolR       tamsulosin (FLOMAX) 0.4 mg Cap Take 1 capsule (0.4 mg total) by mouth every evening. 30 capsule 0    traMADol (ULTRAM) 50 mg tablet       trolamine salicylate (ASPERCREME) 10 % cream Apply topically as needed.      cholestyramine (QUESTRAN) 4 gram packet       ciprofloxacin HCl (CIPRO) 500 MG tablet Take 1 tablet (500 mg total) by mouth 2 (two) times daily. 20 tablet 0    diphenoxylate-atropine 2.5-0.025 mg (LOMOTIL) 2.5-0.025 mg per tablet Take 1 tablet by mouth 4 (four) times daily as needed for Diarrhea (for episodic diarrhea). 30 tablet 0    metroNIDAZOLE (FLAGYL) 500 MG tablet Take 1 tablet (500 mg total) by mouth 3 (three) times daily. 30 tablet 0    ondansetron (ZOFRAN) 4 MG tablet Take 1 tablet (4 mg total) by mouth every 8 (eight) hours as needed for Nausea. 40 tablet 2    promethazine (PHENERGAN) 12.5 MG Tab Take 1 tablet (12.5 mg total) by mouth every 8 (eight) hours as needed (nausea). 6 tablet 0    traZODone (DESYREL) 50 MG tablet        No current facility-administered medications for this visit.        Review of Systems   Constitutional: Negative for chills, fatigue, fever and unexpected weight change.   HENT: Negative for sore throat and trouble swallowing.    Eyes: Positive for visual disturbance (pt attributes to stroke). Negative  "for photophobia and pain.   Respiratory: Negative for chest tightness and shortness of breath.    Cardiovascular: Negative for chest pain, palpitations and leg swelling.   Gastrointestinal: Positive for abdominal pain and diarrhea (3-4 times per day). Negative for constipation, nausea and vomiting.   Genitourinary: Negative for difficulty urinating and dysuria.   Musculoskeletal: Positive for arthralgias (shoulders, right hand). Negative for back pain.   Skin: Negative for color change and rash.   Neurological: Positive for light-headedness. Negative for dizziness, weakness, numbness and headaches.   Hematological: Negative for adenopathy. Does not bruise/bleed easily.       ECOG Performance Status: 1   Objective:      Vitals:   Vitals:    03/13/20 1319   BP: (!) 173/74   BP Location: Right arm   Patient Position: Sitting   BP Method: Medium (Automatic)   Pulse: 60   Temp: 98.5 °F (36.9 °C)   TempSrc: Oral   SpO2: 97%   Weight: 79.4 kg (175 lb 0.7 oz)   Height: 5' 6" (1.676 m)     Physical Exam   Constitutional: She is oriented to person, place, and time. She appears well-developed and well-nourished. No distress.   HENT:   Head: Normocephalic and atraumatic.   Eyes: EOM are normal. Right eye exhibits no discharge. Left eye exhibits no discharge. No scleral icterus.   Cardiovascular: Normal rate, regular rhythm, normal heart sounds and intact distal pulses. Exam reveals no gallop and no friction rub.   No murmur heard.  Pulmonary/Chest: Effort normal and breath sounds normal. No respiratory distress. She has no wheezes. She has no rales. She exhibits no tenderness.   Abdominal: Soft. Bowel sounds are normal. She exhibits no distension and no mass. There is no tenderness. There is no rebound and no guarding.   Vertical scar in abdomen.  Diagonal scar on right abdomen.   Musculoskeletal: Normal range of motion. She exhibits no edema or tenderness.   Lymphadenopathy:     She has no cervical adenopathy.     She has no " axillary adenopathy.        Right: No supraclavicular adenopathy present.        Left: No supraclavicular adenopathy present.   Neurological: She is alert and oriented to person, place, and time.   Skin: No rash noted. She is not diaphoretic. No erythema.   Psychiatric: She has a normal mood and affect. Her behavior is normal.       Laboratory Data:  No visits with results within 1 Week(s) from this visit.   Latest known visit with results is:   Admission on 01/16/2020, Discharged on 01/16/2020   Component Date Value Ref Range Status    WBC 01/16/2020 6.08  3.90 - 12.70 K/uL Final    RBC 01/16/2020 4.34  4.00 - 5.40 M/uL Final    Hemoglobin 01/16/2020 12.0  12.0 - 16.0 g/dL Final    Hematocrit 01/16/2020 38.9  37.0 - 48.5 % Final    Mean Corpuscular Volume 01/16/2020 90  82 - 98 fL Final    Mean Corpuscular Hemoglobin 01/16/2020 27.6  27.0 - 31.0 pg Final    Mean Corpuscular Hemoglobin Conc 01/16/2020 30.8* 32.0 - 36.0 g/dL Final    RDW 01/16/2020 14.5  11.5 - 14.5 % Final    Platelets 01/16/2020 258  150 - 350 K/uL Final    MPV 01/16/2020 10.4  9.2 - 12.9 fL Final    Gran # (ANC) 01/16/2020 4.1  1.8 - 7.7 K/uL Final    Lymph # 01/16/2020 1.4  1.0 - 4.8 K/uL Final    Mono # 01/16/2020 0.5  0.3 - 1.0 K/uL Final    Eos # 01/16/2020 0.1  0.0 - 0.5 K/uL Final    Baso # 01/16/2020 0.01  0.00 - 0.20 K/uL Final    Gran% 01/16/2020 68.0  38.0 - 73.0 % Final    Lymph% 01/16/2020 22.2  18.0 - 48.0 % Final    Mono% 01/16/2020 8.4  4.0 - 15.0 % Final    Eosinophil% 01/16/2020 1.2  0.0 - 8.0 % Final    Basophil% 01/16/2020 0.2  0.0 - 1.9 % Final    Differential Method 01/16/2020 Automated   Final    Sodium 01/16/2020 143  136 - 145 mmol/L Final    Potassium 01/16/2020 3.4* 3.5 - 5.1 mmol/L Final    Chloride 01/16/2020 102  95 - 110 mmol/L Final    CO2 01/16/2020 27  23 - 29 mmol/L Final    Glucose 01/16/2020 84  70 - 110 mg/dL Final    BUN, Bld 01/16/2020 15  8 - 23 mg/dL Final    Creatinine  01/16/2020 1.1  0.5 - 1.4 mg/dL Final    Calcium 01/16/2020 10.3  8.7 - 10.5 mg/dL Final    Total Protein 01/16/2020 8.7* 6.0 - 8.4 g/dL Final    Albumin 01/16/2020 4.2  3.5 - 5.2 g/dL Final    Total Bilirubin 01/16/2020 0.5  0.1 - 1.0 mg/dL Final    Comment: For infants and newborns, interpretation of results should be based  on gestational age, weight and in agreement with clinical  observations.  Premature Infant recommended reference ranges:  Up to 24 hours.............<8.0 mg/dL  Up to 48 hours............<12.0 mg/dL  3-5 days..................<15.0 mg/dL  6-29 days.................<15.0 mg/dL      Alkaline Phosphatase 01/16/2020 106  55 - 135 U/L Final    AST 01/16/2020 15  10 - 40 U/L Final    ALT 01/16/2020 8* 10 - 44 U/L Final    Anion Gap 01/16/2020 14  8 - 16 mmol/L Final    eGFR if African American 01/16/2020 >60  >60 mL/min/1.73 m^2 Final    eGFR if non African American 01/16/2020 52* >60 mL/min/1.73 m^2 Final    Comment: Calculation used to obtain the estimated glomerular filtration  rate (eGFR) is the CKD-EPI equation.       Lipase 01/16/2020 14  4 - 60 U/L Final    Specimen UA 01/16/2020 Urine, Clean Catch   Final    Color, UA 01/16/2020 Yellow  Yellow, Straw, [place] Final    Appearance, UA 01/16/2020 Clear  Clear Final    pH, UA 01/16/2020 6.0  5.0 - 8.0 Final    Specific Gravity, UA 01/16/2020 >=1.030* 1.005 - 1.030 Final    Protein, UA 01/16/2020 Negative  Negative Final    Comment: Recommend a 24 hour urine protein or a urine   protein/creatinine ratio if globulin induced proteinuria is  clinically suspected.      Glucose, UA 01/16/2020 Negative  Negative Final    Ketones, UA 01/16/2020 Negative  Negative Final    Bilirubin (UA) 01/16/2020 Negative  Negative Final    Occult Blood UA 01/16/2020 Negative  Negative Final    Nitrite, UA 01/16/2020 Negative  Negative Final    Urobilinogen, UA 01/16/2020 Negative  <2.0 EU/dL Final    Leukocytes, UA 01/16/2020 Negative  Negative  Final         Imaging: Gallium Scan 12/05/20    Abnormal tracer uptake in the liver, primarily in the left lobe, and in a right lower quadrant partially calcified mesenteric mass.  No new sites of uptake are identified.  Physiologic uptake is noted in the spleen, kidneys, urinary bladder and thyroid.    Assessment:       1. Metastatic carcinoid tumor    2. Neoplasm of unspecified behavior of endocrine glands and other parts of nervous system     3. Carcinoid syndrome    4. Hypertension, unspecified type    5. H/O: CVA (cerebrovascular accident)           Plan:     Metastatic Carcinoid Tumor - The patient has metastatic carcinoid tumor with mets in the liver and mesentery  -Last staging scan done 12/05/19 with Gallium scan  -Previous biopsy of the liver showed well-differentiated neuroendocrine tumor, with Ki-67 proliferation index: less than 1%, and mitotic index: 0/10 high-power fields  -She has frequent diarrhea with imodium and lomotil  -Patient previously on Lanreotide with symptom relief  -Patient was consented for chemotherapy today 3/13/2020 for Lanreotide.   An extensive discussion was had which included a thorough discussion of the risk and benefits of treatment and alternatives.  Risks, including but not limited to, possible hair loss, bone marrow damage (anemia, thrombocytopenia, immune suppression, neutropenia), damage to body organs (brain, heart, liver, kidney, lungs, nervous system, skin, and others), allergic reactions, sterility, nausea/vomiting, constipation/diarrhea, sores in the mouth, secondary cancers, local damage at possible injection sites, and rarely death were all discussed.  The patient agrees with the plan, and all questions have been answered to their satisfaction.  Consent was signed the patient, provider, and a third party witness.    -Will treat patient with Lanreotide 120mg SQ monthly  -Pt will need a CBC, CMP, TSH, T4 the day prior to getting treatment.  -Patient also currently  under the care of Dr. Chris Herbert at [facility] considering additional treatment with chemoembolization  -Pt to see Dr Herbert next on 4/16/20    HTN - pt on metoprolol and losartan  -PCP managing  -Will monitor    H/O CVA - Pt with MRI 2/14/20 at [facility] showing linear focus of acute/subacute ischemia in the posterior aspect of the josey, just left of the midline  -Pt to see a neurologist at [facility] next month  -Pt on BP meds, statin plavix    Cancer related pain - pt on Tramadol  -Will monitor    Advance Care Planning     Power of   I initiated the process of advance care planning today and explained the importance of this process to the patient.  I introduced the concept of advance directives to the patient, as well. Then the patient received detailed information about the importance of designating a Health Care Power of  (HCPOA). She was also instructed to communicate with this person about their wishes for future healthcare, should she become sick and lose decision-making capacity. The patient has not previously appointed a HCPOA. After our discussion, the patient has decided to complete a HCPOA and will bring the form completed to her next clinic appointment.          Follow Up - 4 weeks after the first Lanreotide treatment with CBC and CMP prior to the appt      Gonzales Bishop MD  Hematology and Oncology  [facility]  Office:[phone]  Fax: [phone]

## 2020-03-16 ENCOUNTER — TELEPHONE (OUTPATIENT)
Dept: HEMATOLOGY/ONCOLOGY | Facility: HOSPITAL | Age: 70
End: 2020-03-16

## 2020-03-16 ENCOUNTER — NURSE TRIAGE (OUTPATIENT)
Dept: ADMINISTRATIVE | Facility: CLINIC | Age: 70
End: 2020-03-16

## 2020-03-16 ENCOUNTER — OUTPATIENT CASE MANAGEMENT (OUTPATIENT)
Dept: ADMINISTRATIVE | Facility: OTHER | Age: 70
End: 2020-03-16

## 2020-03-16 NOTE — PROGRESS NOTES
"Outpatient Care Management  Plan of Care Follow Up Visit    Patient: Patito Domingo  MRN: [id number]  Date of Service: 03/16/2020  Completed by: Padmini Clements RN  Referral Date: 11/13/2019  Program: Case Management (High Risk)    Reason for Visit   Patient presents with    Update Plan Of Care       Brief Summary: @ 0800 Received a call from the pt to report that the diarrhea is back. Reports that she feels weak. Pt reports that she is having abd pain. Pt also reports that she feels some weakness from the stroke and thinks that she needs some rehab. Pt reports that she saw and doctor on Friday and he was not aware that she had a stroke recently. Pt reports that she wanted someone to talk to because she does not know what to do for the weakness. Pt report that her daughter- in- law took her bp last night and the reading was 171/81. Pt given the phone number for [facility] [phone]. Instructed pt to describe to the on call nurse her current symptoms. Pt verbalized understanding. Pt is talking with a male while on the phone at this time.  0830 Received a call back from the pt to report that she called the number. Pt reports that she is "so weak". Pt reports that it feels like her legs are giving out. Pt reports that she is afraid and feels like she is about to leave here. Instructed pt to go to the emergency room. Pt continues to talk and reports that she feels bad. Instructed pt to get prepared to go to the ED.Pt reports that she will call the taxi because she is home alone.   0854 Call place to pt for update. Pt reports that she is dressed and is about to call for transportation.    Patient Summary     Involvement of Care:  Do I have permission to speak with other family members about your care?  yes    Patient Reported Labs & Vitals:  1.  Any Patient Reported Labs & Vitals?     2.  Patient Reported Blood Pressure:     3.  Patient Reported Pulse:     4.  Patient Reported Weight (Kg):     5.  Patient " Reported Blood Glucose (mg/dl):       Medical History:  Reviewed medical history with patient and/or caregiver    Social History:  Reviewed social history with patient and/or caregiver    Clinical Assessment     Reviewed and provided basic information on available community resources for mental health, transportation, wellness resources, and palliative care programs with patient and/or caregiver.    Complex Care Plan     Care plan was discussed and completed today with input from patient and/or caregiver.    Goals      Patient/caregiver will accept life style changes to manage and improve coping due to Metastatic Carcinoid tumor prior to discharge from OPCM. - Priority: High      Overall Time to Completion  2 months from 11/20/2019     OPCM Identified Patient Barriers:  Depression: LSMW; Referred        OPCM Identified Disease Education Barriers:  Hypertension:  Care plan created  Falls: Care plan created          Short Term Goals  Patient/caregiver will verbalize 2 risk factors of Ineffective coping within 1 month.  Interventions   · Assess patient's ability to perform ADLs.  · Assess type of communication barriers.  · Collaborate with Physician as appropriate to meet patient needs.  · Provide contact information for 24 hour Crisis Line number- [facility].  · Recognize and provide educational material (REMEDIOS).  · Refer to Outpatient Case Management Social Worker.     Status  · Met12/4/19, 12/12/19      Patient/caregiver will verbalize 2 strategies to effectivecoping strategies within 1 month.  Interventions   · Assess type of communication barriers. 1/30/2020  · Collaborate with Physician as appropriate to meet patient needs.  · Facilitate referral for counseling.  · Provide contact information for 24 hour Crisis Line number- [facility].1/30/2020  · Recognize and provide educational material (REMEDIOS).3/12/2020  · Refer to Outpatient Case Management Social Worker.  Patient/Caregiver agrees to pt will verbalize  willingness to consider counseling for coping by  2 weeks.  · Patient/Caregiver agrees to OPCM follow up within 2 weeks to assess progress to goal. 3/12/2020  ·      Status  · Partially met           Clinical Reference Documents Added to Patient Instructions       Document    CANCER, RESOURCES FOR PEOPLE WITH (ENGLISH)    FALLS IN THE HOME, PREVENTING (ENGLISH)    FALLS, PREVENTING, MAKE YOUR HEALTH A PRIORITY (ENGLISH)    ONCOLOGY: COMMUNICATING WITH OTHERS  (ENGLISH)    WEAKNESS (UNCERTAIN CAUSE) (ENGLISH)             Patient/caregiver will accept life style changes to manage and improve Diarrhea prior to discharge from OPCM. - Priority: High      Overall Time to Completion  2 weeks from 01/16/2020     OPCM Identified Patient Needs:    Depression: LSMW; Referred        OPCM Identified Disease Education Barriers:  Hypertension:  Care plan created  Falls: Care plan created       Short Term Goals  Patient/caregiver will verbalize 2 risk factors of Diarrhea within 2 weeks.  Interventions   · Assess patient's ability to perform ADLs.1/30/2020  · Collaborate with Physician as appropriate to meet patient needs.1/16/2020  · Recognize and provide educational material (REMEDIOS).  ·   Patient/Caregiver agrees to seek medical care by  3/16/2020.  · Patient/Caregiver agrees to OPCM follow up within 3-5 days to assess progress to goal. 3/16/2020  ·      Status  · Met 1/30/2020  · Resumed, 3/16/2020      Patient/caregiver will verbalize 2 ways of preventing complications due to disease process within 2 weeks.  Interventions   · Assess patient's ability to perform ADLs.1/30/2020  · Collaborate with Physician as appropriate to meet patient needs.  · Encourage Dietary Compliance.  · Encourage Medication Compliance.  · Recognize and provide educational material (REMEDIOS).     Status  · Met 1/30/2020             Patient/caregiver will accept life style changes to manage and improve risk of Falls prior to discharge from OPCM. -  Priority: High      Overall Time to Completion  2 months from 11/20/2019     OPCM Identified Patient Barriers:    Depression: LSMW; Referred        OPCM Identified Disease Education Barriers:  Hypertension:  Care plan created  Falls: Care plan created                     Short Term Goals  Patient/caregiver will verbalize 2 risk factors of Falls within 1 month.  Interventions   · Assess patient's ability to perform ADLs. 3/6/2020  · Collaborate with Physician as appropriate to meet patient needs.  · Discuss alternate Levels of Care with patient/caregiver. 3/6/2020  · Encourage Exercise.  · Facilitate referral to Outpatient Rehab.Deferred  · Recognize and provide educational material (REMEDIOS).3/6/2020  Patient/Caregiver agrees to utilize walker when ambulating by  1 week.  · Patient/Caregiver agrees to OPCM follow up within 1 week to assess progress to goal. 3/6/2020  ·      Status  · Met 3/12/2020      Patient/caregiver will verbalize 2 strategies to rest breaks and manage fatigue within 1 month.  Interventions   · Assess patient's ability to perform ADLs.3/12/2020  · Collaborate with Physician as appropriate to meet patient needs.  · Recognize and provide educational material (REMEDIOS).3/6/2020     Status  · Met 3/12/2020           Clinical Reference Documents Added to Patient Instructions       Document    CANCER, RESOURCES FOR PEOPLE WITH (ENGLISH)    FALLS IN THE HOME, PREVENTING (ENGLISH)    FALLS, PREVENTING, MAKE YOUR HEALTH A PRIORITY (ENGLISH)    ONCOLOGY: COMMUNICATING WITH OTHERS  (ENGLISH)    WEAKNESS (UNCERTAIN CAUSE) (ENGLISH)                  Patient Instructions     Instructions were provided via the [facility] patient resources and are available for the patient to view on the patient portal.    Next Steps: F/u with pt's c/o diarrhea and weakness    Follow up in about 3 days (around 3/19/2020) for RN Follow.      Todays OPCM Self-Management Care Plan was developed with the patients/caregivers input and  was based on identified barriers from todays assessment.  Goals were written today with the patient/caregiver and the patient has agreed to work towards these goals to improve his/her overall well-being. Patient verbalized understanding of the care plan, goals, and all of today's instructions. Encouraged patient/caregiver to communicate with his/her physician and health care team about health conditions and the treatment plan.  Provided my contact information today and encouraged patient/caregiver to call me with any questions as needed.

## 2020-03-16 NOTE — TELEPHONE ENCOUNTER
I called the patient whom states her BP had gone up and she started having headaches and chest pain at home.  She is currently at [facility] in the ER.  I informed her that they should be able to diagnose and treat her current issue.  I recommended she follow up closely with her PCP on d/c for further BP management.    Gonzales Bishop MD  Hematology and Oncology  [facility]  Office:[phone]  Fax: [phone]

## 2020-03-16 NOTE — TELEPHONE ENCOUNTER
Patient called [facility] concerned for high BP.  Last reading 180/81.  Paitent states that she is weak and still having diarrhea.  Disposition: Go to ED now.  Patient states that she will be going to [facility] by cab. Called [facility] to notify of patient arrival.  Patient states that she has not had chemotherapy yet due to medication not being available.    Reason for Disposition   Systolic BP >= 160 OR Diastolic >= 100, and any cardiac or neurologic symptoms (e.g., chest pain, difficulty breathing, unsteady gait, blurred vision)    Additional Information   Negative: Sounds like a life-threatening emergency to the triager   Negative: Pregnant > 20 weeks and new hand or face swelling   Negative: Pregnant > 20 weeks and BP > 140/90    Protocols used: HIGH BLOOD PRESSURE-A-OH

## 2020-03-17 ENCOUNTER — OUTPATIENT CASE MANAGEMENT (OUTPATIENT)
Dept: ADMINISTRATIVE | Facility: OTHER | Age: 70
End: 2020-03-17

## 2020-03-17 ENCOUNTER — OFFICE VISIT (OUTPATIENT)
Dept: FAMILY MEDICINE | Facility: CLINIC | Age: 70
End: 2020-03-17
Payer: MEDICARE

## 2020-03-17 VITALS
HEART RATE: 79 BPM | BODY MASS INDEX: 28.63 KG/M2 | OXYGEN SATURATION: 97 % | TEMPERATURE: 99 F | HEIGHT: 66 IN | SYSTOLIC BLOOD PRESSURE: 139 MMHG | WEIGHT: 178.13 LBS | DIASTOLIC BLOOD PRESSURE: 80 MMHG

## 2020-03-17 DIAGNOSIS — G89.4 CHRONIC PAIN SYNDROME: ICD-10-CM

## 2020-03-17 DIAGNOSIS — F11.90 NARCOTIC DRUG USE: ICD-10-CM

## 2020-03-17 DIAGNOSIS — R10.9 ABDOMINAL PAIN, UNSPECIFIED ABDOMINAL LOCATION: ICD-10-CM

## 2020-03-17 DIAGNOSIS — Z09 HOSPITAL DISCHARGE FOLLOW-UP: Primary | ICD-10-CM

## 2020-03-17 DIAGNOSIS — I10 ESSENTIAL HYPERTENSION: ICD-10-CM

## 2020-03-17 DIAGNOSIS — G47.09 OTHER INSOMNIA: ICD-10-CM

## 2020-03-17 PROCEDURE — 99999 PR PBB SHADOW E&M-EST. PATIENT-LVL III: CPT | Mod: PBBFAC,HCNC,, | Performed by: FAMILY MEDICINE

## 2020-03-17 PROCEDURE — 1125F AMNT PAIN NOTED PAIN PRSNT: CPT | Mod: HCNC,S$GLB,, | Performed by: FAMILY MEDICINE

## 2020-03-17 PROCEDURE — 1101F PT FALLS ASSESS-DOCD LE1/YR: CPT | Mod: HCNC,CPTII,S$GLB, | Performed by: FAMILY MEDICINE

## 2020-03-17 PROCEDURE — 1159F MED LIST DOCD IN RCRD: CPT | Mod: HCNC,S$GLB,, | Performed by: FAMILY MEDICINE

## 2020-03-17 PROCEDURE — 99999 PR PBB SHADOW E&M-EST. PATIENT-LVL III: ICD-10-PCS | Mod: PBBFAC,HCNC,, | Performed by: FAMILY MEDICINE

## 2020-03-17 PROCEDURE — 1159F PR MEDICATION LIST DOCUMENTED IN MEDICAL RECORD: ICD-10-PCS | Mod: HCNC,S$GLB,, | Performed by: FAMILY MEDICINE

## 2020-03-17 PROCEDURE — 99214 OFFICE O/P EST MOD 30 MIN: CPT | Mod: HCNC,S$GLB,, | Performed by: FAMILY MEDICINE

## 2020-03-17 PROCEDURE — 1125F PR PAIN SEVERITY QUANTIFIED, PAIN PRESENT: ICD-10-PCS | Mod: HCNC,S$GLB,, | Performed by: FAMILY MEDICINE

## 2020-03-17 PROCEDURE — 3075F PR MOST RECENT SYSTOLIC BLOOD PRESS GE 130-139MM HG: ICD-10-PCS | Mod: HCNC,CPTII,S$GLB, | Performed by: FAMILY MEDICINE

## 2020-03-17 PROCEDURE — 99214 PR OFFICE/OUTPT VISIT, EST, LEVL IV, 30-39 MIN: ICD-10-PCS | Mod: HCNC,S$GLB,, | Performed by: FAMILY MEDICINE

## 2020-03-17 PROCEDURE — 1101F PR PT FALLS ASSESS DOC 0-1 FALLS W/OUT INJ PAST YR: ICD-10-PCS | Mod: HCNC,CPTII,S$GLB, | Performed by: FAMILY MEDICINE

## 2020-03-17 PROCEDURE — 3075F SYST BP GE 130 - 139MM HG: CPT | Mod: HCNC,CPTII,S$GLB, | Performed by: FAMILY MEDICINE

## 2020-03-17 PROCEDURE — 3079F PR MOST RECENT DIASTOLIC BLOOD PRESSURE 80-89 MM HG: ICD-10-PCS | Mod: HCNC,CPTII,S$GLB, | Performed by: FAMILY MEDICINE

## 2020-03-17 PROCEDURE — 99499 UNLISTED E&M SERVICE: CPT | Mod: S$PBB,HCNC,, | Performed by: FAMILY MEDICINE

## 2020-03-17 PROCEDURE — 99499 RISK ADDL DX/OHS AUDIT: ICD-10-PCS | Mod: S$PBB,HCNC,, | Performed by: FAMILY MEDICINE

## 2020-03-17 PROCEDURE — 3079F DIAST BP 80-89 MM HG: CPT | Mod: HCNC,CPTII,S$GLB, | Performed by: FAMILY MEDICINE

## 2020-03-17 RX ORDER — TRAZODONE HYDROCHLORIDE 50 MG/1
50 TABLET ORAL NIGHTLY PRN
Qty: 30 TABLET | Refills: 1 | Status: ON HOLD | OUTPATIENT
Start: 2020-03-17 | End: 2021-03-15

## 2020-03-17 RX ORDER — DIPHENOXYLATE HYDROCHLORIDE AND ATROPINE SULFATE 2.5; .025 MG/1; MG/1
1 TABLET ORAL
COMMUNITY
Start: 2020-03-16 | End: 2020-03-21

## 2020-03-17 RX ORDER — ACETAMINOPHEN 500 MG
500 TABLET ORAL EVERY 6 HOURS PRN
Qty: 60 TABLET | Refills: 1 | Status: ON HOLD | OUTPATIENT
Start: 2020-03-17 | End: 2021-03-15

## 2020-03-17 NOTE — PROGRESS NOTES
Outpatient Care Management  Plan of Care Follow Up Visit    Patient: Patito Domingo  MRN: [id number]  Date of Service: 03/17/2020  Completed by: Padmini Clements RN  Referral Date: 11/13/2019  Program: Case Management (High Risk)    Reason for Visit   Patient presents with    OPCM Chart Review       Brief Summary: Received a call from the pt reporting that she is having diarrhea and weakness. Pt is tearful at this time and reports that she is at home alone. Pt reports that she is weak and is incontinent of bowels. Pt reports that she has never had diarrhea like this before in her life. Informed pt that per chart review the c/o diarrhea started in 2014. Pt reports yes, when she started with the stomach problems. Pt reports that her skin is irritated. Educated pt to use a skin barrier. Pt reports that she used the AD Ointment. Educated pt on Princess's Butt Paste and other skin barriers. Pt reports that she used Princess's Butt Paste in the paste but is currently out of that ointment. Pt reported that she is incontinent and it is stupid. Pt reports that she uses adult briefs due to incontinence. Pt reports that she has an appointment today. Appt noted with Dr. HIRAL Ramirez at 2:20. Pt encouraged to go to the appt on today. Pt reports that she really need to go to rehab. Pt reports that she was dehydrated on yesterday while at the emergency room at [facility] and was given IV fluids. Pt instructed to go to scheduled appt on today.  Patient Summary     Involvement of Care:  Do I have permission to speak with other family members about your care?  yes    Patient Reported Labs & Vitals:  1.  Any Patient Reported Labs & Vitals?     2.  Patient Reported Blood Pressure:     3.  Patient Reported Pulse:     4.  Patient Reported Weight (Kg):     5.  Patient Reported Blood Glucose (mg/dl):       Medical History:  Reviewed medical history with patient and/or caregiver    Social History:  Reviewed social history with patient  and/or caregiver    Clinical Assessment     Reviewed and provided basic information on available community resources for mental health, transportation, wellness resources, and palliative care programs with patient and/or caregiver.    Complex Care Plan     Care plan was discussed and completed today with input from patient and/or caregiver.    Goals      Patient/caregiver will accept life style changes to manage and improve coping due to Metastatic Carcinoid tumor prior to discharge from OPCM. - Priority: High      Overall Time to Completion  2 months from 11/20/2019     OPCM Identified Patient Barriers:  Depression: LSMW; Referred        OPCM Identified Disease Education Barriers:  Hypertension:  Care plan created  Falls: Care plan created          Short Term Goals  Patient/caregiver will verbalize 2 risk factors of Ineffective coping within 1 month.  Interventions   · Assess patient's ability to perform ADLs.  · Assess type of communication barriers.  · Collaborate with Physician as appropriate to meet patient needs.  · Provide contact information for 24 hour Crisis Line number- [facility].  · Recognize and provide educational material (REMEDIOS).  · Refer to Outpatient Case Management Social Worker.     Status  · Met12/4/19, 12/12/19      Patient/caregiver will verbalize 2 strategies to effectivecoping strategies within 1 month.  Interventions   · Assess type of communication barriers. 1/30/2020  · Collaborate with Physician as appropriate to meet patient needs.  · Facilitate referral for counseling.  · Provide contact information for 24 hour Crisis Line number- [facility] LINE.1/30/2020  · Recognize and provide educational material (REMEDIOS).3/12/2020  · Refer to Outpatient Case Management Social Worker.  Patient/Caregiver agrees to pt will verbalize willingness to consider counseling for coping by  2 weeks.  · Patient/Caregiver agrees to OPCM follow up within 2 weeks to assess progress to goal. 3/12/2020  ·       Status  · Partially met           Clinical Reference Documents Added to Patient Instructions       Document    CANCER, RESOURCES FOR PEOPLE WITH (ENGLISH)    FALLS IN THE HOME, PREVENTING (ENGLISH)    FALLS, PREVENTING, MAKE YOUR HEALTH A PRIORITY (ENGLISH)    ONCOLOGY: COMMUNICATING WITH OTHERS  (ENGLISH)    WEAKNESS (UNCERTAIN CAUSE) (ENGLISH)             Patient/caregiver will accept life style changes to manage and improve Diarrhea prior to discharge from OPCM. - Priority: High      Overall Time to Completion  2 weeks from 01/16/2020     OPCM Identified Patient Needs:    Depression: LSMW; Referred        OPCM Identified Disease Education Barriers:  Hypertension:  Care plan created  Falls: Care plan created       Short Term Goals  Patient/caregiver will verbalize 2 risk factors of Diarrhea within 2 weeks.  Interventions   · Assess patient's ability to perform ADLs.1/30/2020  · Collaborate with Physician as appropriate to meet patient needs.1/16/2020  · Recognize and provide educational material (KRAPHILIP).  ·   Patient/Caregiver agrees to seek medical care by  3/16/2020.  · Patient/Caregiver agrees to OPCM follow up within 3-5 days to assess progress to goal. 3/16/2020  ·      Status  · Met 1/30/2020  · Resumed, 3/16/2020      Patient/caregiver will verbalize 2 ways of preventing complications due to disease process within 2 weeks.  Interventions   · Assess patient's ability to perform ADLs.1/30/2020  · Collaborate with Physician as appropriate to meet patient needs.  · Encourage Dietary Compliance.  · Encourage Medication Compliance.  · Recognize and provide educational material (KRAPHILIP).     Status  · Met 1/30/2020             Patient/caregiver will accept life style changes to manage and improve risk of Falls prior to discharge from OPCM. - Priority: High      Overall Time to Completion  2 months from 11/20/2019     OPCM Identified Patient Barriers:    Depression: LSMW; Referred        OPCM Identified Disease  Education Barriers:  Hypertension:  Care plan created  Falls: Care plan created                     Short Term Goals  Patient/caregiver will verbalize 2 risk factors of Falls within 1 month.  Interventions   · Assess patient's ability to perform ADLs. 3/6/2020  · Collaborate with Physician as appropriate to meet patient needs.  · Discuss alternate Levels of Care with patient/caregiver. 3/6/2020  · Encourage Exercise.  · Facilitate referral to Outpatient Rehab.Deferred  · Recognize and provide educational material (REMEDIOS).3/6/2020  Patient/Caregiver agrees to utilize walker when ambulating by  1 week.  · Patient/Caregiver agrees to OPCM follow up within 1 week to assess progress to goal. 3/6/2020  ·      Status  · Met 3/12/2020      Patient/caregiver will verbalize 2 strategies to rest breaks and manage fatigue within 1 month.  Interventions   · Assess patient's ability to perform ADLs.3/12/2020  · Collaborate with Physician as appropriate to meet patient needs.  · Recognize and provide educational material (REMEDIOS).3/6/2020     Status  · Met 3/12/2020           Clinical Reference Documents Added to Patient Instructions       Document    CANCER, RESOURCES FOR PEOPLE WITH (ENGLISH)    FALLS IN THE HOME, PREVENTING (ENGLISH)    FALLS, PREVENTING, MAKE YOUR HEALTH A PRIORITY (ENGLISH)    ONCOLOGY: COMMUNICATING WITH OTHERS  (ENGLISH)    WEAKNESS (UNCERTAIN CAUSE) (ENGLISH)                  Patient Instructions     Instructions were provided via the [facility] patient resources and are available for the patient to view on the patient portal.    Next Steps: na    Follow up in about 6 days (around 3/23/2020) for RN Follow.      [facility] OPCM Self-Management Care Plan was developed with the patients/caregivers input and was based on identified barriers from todays assessment.  Goals were written today with the patient/caregiver and the patient has agreed to work towards these goals to improve his/her overall well-being.  Patient verbalized understanding of the care plan, goals, and all of today's instructions. Encouraged patient/caregiver to communicate with his/her physician and health care team about health conditions and the treatment plan.  Provided my contact information today and encouraged patient/caregiver to call me with any questions as needed.

## 2020-03-17 NOTE — PROGRESS NOTES
Office Visit    Patient Name: Patito Domingo    : 1950  MRN: [id number]      Assessment/Plan:  Patito Domingo is a 69 y.o. female who presents today for :    Hospital discharge follow-up  Abdominal pain, unspecified abdominal location  Narcotic drug use  Chronic pain syndrome  -     acetaminophen (TYLENOL) 500 MG tablet; Take 1 tablet (500 mg total) by mouth every 6 (six) hours as needed for Pain.  Dispense: 60 tablet; Refill: 1  -AFVSS, exam unremarkable,  doing better post-discharge  -continue current medications PRN.  -call clinic back with any concerns      Other insomnia  -     traZODone (DESYREL) 50 MG tablet; Take 1 tablet (50 mg total) by mouth nightly as needed for Insomnia (May take 1-2 tabs nightly as needed for insomnia).  Dispense: 30 tablet; Refill: 1  -I had a detailed discussion with patient regarding the risks and benefits of sleeping medications, and the importance of treating the underlying cause of insomnia instead of just treating insomnia with sleep medications. We discussed modifying pre-bedtime routine such as avoid watching TV at least 3-4 hours before bedtime, as well a no cell phone use in bed. We also discussed healthy diet and pursuing a regular physical exercise for physical and mental well-being to help with better, quality sleep.     Essential hypertension  -continue current medication regimen  -DASH diet, regular cardiovascular exercises        Follow up for worsening Sx. Urgent care/ED precautions provided.      This note was created by combination of typed  and MModal dictation.  Transcription errors may be present.  If there are any questions, please contact me.        ----------------------------------------------------------------------------------------------------------------------      HPI:  Patient Care Team:  Pantera Rosen MD as PCP - General (Internal Medicine)  Gonzales Bishop MD as PCP - Hematology/Oncology (Hematology and Oncology)  Stan GARCIA  MD Jerald as Consulting Physician (Urology)  Kirsten Hoffmann MA as Care Coordinator  Kirsten Hoffmann MA as Care Coordinator  Char Robbins MD as Consulting Physician (Nephrology)  Padmini Clements RN as Outpatient   Tracie Weil-Cavalier, RD as Dietitian (Nutrition)    Patito is a 69 y.o. 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    Chronic pain syndrome    Narcotic drug use    Palliative care encounter    Goals of care, counseling/discussion    Encounter for education    Syncope    Renal colic on right side    Kidney stones    Right lower quadrant pain    Right shoulder pain    Essential hypertension    Anemia of chronic disease    Mood disorder    Chronic pain of both shoulders    Decreased range of motion of shoulder    Weakness of shoulder    Hypokalemia    Generalized abdominal pain    Nausea vomiting and diarrhea    Impaired functional mobility, balance, gait, and endurance    Decreased strength    Chronic abdominal pain    Neoplasm of unspecified behavior of endocrine glands and other parts of nervous system     This patient is new to me       Patient presents today for :  Follow-up    Patient is here today for f/u of recent ED visit from yesterday for abd pain, associated with nausea, vomiting and diarrhea that started two days ago. She had CT abd scan that was negative for any acute abnormalities. She was given IVF, as well as a Dilaudid injection and Zofran. Patient has been doing well since discharge without any SOB/BLACK/CP.  Appetite is at baseline, she states her diarrhea has improved  with Lomotil that was given to her in the ED.  It was also noted that she wanted to get a refill of her pain medication but was declined, and she again asked for refills of her Oxycodone today, which I explain to her that I will defer to her pain physician.  She would also like to get a refill on her sleep medication, which she has been on in the past and has run out, and I discussed sleep hygiene with her for better quality sleep.        Additional ROS  No F/C/wt changes/fatigue  No dysphagia/sore throat/rhinorrhea  No CP/BLACK/palpitations/swelling  No cough/wheezing/SOB  No nausea/vomiting,+abd pain/diarrhea, no constipation, no blood in stool  No muscle aches/joint pain   No rashes  No MSK weakness/HA/tingling/numbness  No anxiety/depression        ED 3/17/2020 Summary         Devon Parry MD - 03/16/2020 2:37 PM CDT  Formatting of this note might be different from the original.    History     Chief Complaint   Patient presents with    Nausea    Emesis     HPI: Patient complaining of abdominal pain with associated nausea, vomiting and diarrhea that has been going on for several days. Patient reports history of carcinoid tumor of the stomach. She reports that she has an oncologist at [facility]. Her primary care doctors also at [facility]. Her pain management physician though is here at [place]. He denies having any fever chills. She reports that she feels like she is dehydrated. Medical records reflect that the patient has been to the emergency department several times with the same complaint. She has been diagnosed with functional diarrhea related to colon resection. She denies having any blood in her stools        ED Course as of Mar 16 1627   Mon Mar 16, 2020   1618 Patient's labs do not show any acute abnormalities. Her CT of the abdomen shows no obvious signs of acute infection or obstruction. Patient has known carcinoid tumor which apparently showing up on CT. She was given IV fluids and Dilaudid  and Zofran for her symptoms. She is requesting more pain medication. I have explained to her that her labs are normal and CT shows no acute findings. I have asked her to follow up with her pain doctor, Dr. Gerber. I informed her that I could not prescribe her any narcotic medication for her pain. She request something to help slow her diarrhea down. I will give her few doses of Lomotil to help her. I have again reiterated that she should follow up with her physicians at [facility]   [GABRIELA]     ED Course User Index  [GABRIELA] Devon Parry MD     Clinical Impression     1. Chronic abdominal pain   2. Carcinoid tumor of abdomen   3. Functional diarrhea   4. 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    Chronic pain syndrome    Narcotic drug use    Palliative care encounter    Goals of care, counseling/discussion    Encounter for education    Syncope    Renal colic on right side    Kidney stones    Right lower quadrant pain    Right shoulder pain    Essential hypertension    Anemia of chronic disease    Mood disorder    Chronic pain of both shoulders    Decreased range of motion of shoulder    Weakness of shoulder    Hypokalemia    Generalized abdominal pain    Nausea vomiting and diarrhea    Impaired functional mobility, balance, gait, and endurance    Decreased strength    Chronic abdominal pain    Neoplasm of unspecified behavior of endocrine glands and other parts of nervous system       Current  Medications  Medications reviewed and updated.       Current Outpatient Medications:     atorvastatin (LIPITOR) 80 MG tablet, Take 1 tablet (80 mg total) by mouth every evening., Disp: 90 tablet, Rfl: 3    bumetanide (BUMEX) 0.5 MG Tab, Take 1 tablet (0.5 mg total) by mouth daily as needed., Disp: 30 tablet, Rfl: 0    cholestyramine (QUESTRAN) 4 gram packet, , Disp: , Rfl:     cholestyramine-aspartame (QUESTRAN LIGHT) 4 gram PwPk, Take 8 g by mouth once daily., Disp: , Rfl:     ciprofloxacin HCl (CIPRO) 500 MG tablet, Take 1 tablet (500 mg total) by mouth 2 (two) times daily., Disp: 20 tablet, Rfl: 0    cloNIDine (CATAPRES) 0.1 MG tablet, TAKE ONE TABLET BY MOUTH THREE TIMES DAILY AS NEEDED FOR signs of withdrawal, Disp: , Rfl: 1    clopidogreL (PLAVIX) 75 mg tablet, Take 1 tablet (75 mg total) by mouth once daily., Disp: 90 tablet, Rfl: 1    colestipoL (COLESTID) 1 gram Tab, , Disp: , Rfl:     CREON 24,000-76,000 -120,000 unit capsule, , Disp: , Rfl:     cyanocobalamin (VITAMIN B-12) 1000 MCG tablet, Take 1 tablet (1,000 mcg total) by mouth once daily., Disp: 30 tablet, Rfl: 5    diclofenac sodium (VOLTAREN) 1 % Gel, Apply the gel (2 g) to the affected area 4 times daily. Do not apply more than 8 g daily to any one affected joint of the upper extremities., Disp: 100 g, Rfl: 1    dicyclomine (BENTYL) 20 mg tablet, Take 20 mg by mouth., Disp: , Rfl:     diphenoxylate-atropine 2.5-0.025 mg (LOMOTIL) 2.5-0.025 mg per tablet, Take 1 tablet by mouth 4 (four) times daily as needed., Disp: , Rfl:     diphenoxylate-atropine 2.5-0.025 mg (LOMOTIL) 2.5-0.025 mg per tablet, Take 1 tablet by mouth 4 (four) times daily as needed for Diarrhea (for episodic diarrhea)., Disp: 30 tablet, Rfl: 0    diphenoxylate-atropine 2.5-0.025 mg (LOMOTIL) 2.5-0.025 mg per tablet, Take 1 tablet by mouth., Disp: , Rfl:     ferrous sulfate (FEOSOL) 325 mg (65 mg iron) Tab tablet, TAKE ONE TABLET BY MOUTH THREE TIMES PER WEEK, Disp:  12 tablet, Rfl: 2    hydrocortisone valerate (WEST-KAREN) 0.2 % ointment, Apply topically 1 gram 2 (two) times daily. for 7 days, Disp: , Rfl:     losartan (COZAAR) 100 MG tablet, Take 1 tablet (100 mg total) by mouth once daily., Disp: 90 tablet, Rfl: 1    metoprolol tartrate (LOPRESSOR) 50 MG tablet, Take 1 tablet (50 mg total) by mouth 2 (two) times daily., Disp: 180 tablet, Rfl: 1    metroNIDAZOLE (FLAGYL) 500 MG tablet, Take 1 tablet (500 mg total) by mouth 3 (three) times daily., Disp: 30 tablet, Rfl: 0    ondansetron (ZOFRAN) 4 MG tablet, Take 1 tablet (4 mg total) by mouth every 8 (eight) hours as needed for Nausea., Disp: 30 tablet, Rfl: 0    ondansetron (ZOFRAN) 4 MG tablet, Take 1 tablet (4 mg total) by mouth every 8 (eight) hours as needed for Nausea., Disp: 40 tablet, Rfl: 2    oxyCODONE (ROXICODONE) 15 MG Tab, TK 1 T PO  Q 4 H prn, Disp: , Rfl: 0    pantoprazole (PROTONIX) 40 mg GrPS, Take 40 mg by mouth once daily., Disp: , Rfl:     promethazine (PHENERGAN) 12.5 MG Tab, Take 1 tablet (12.5 mg total) by mouth every 8 (eight) hours as needed (nausea)., Disp: 6 tablet, Rfl: 0    psyllium (METAMUCIL SUGAR FREE) Powd, Take 1 packet by mouth 3 (three) times daily., Disp: 90 each, Rfl: 2    SUPREP BOWEL PREP KIT 17.5-3.13-1.6 gram SolR, , Disp: , Rfl:     tamsulosin (FLOMAX) 0.4 mg Cap, Take 1 capsule (0.4 mg total) by mouth every evening., Disp: 30 capsule, Rfl: 0    traMADol (ULTRAM) 50 mg tablet, , Disp: , Rfl:     traZODone (DESYREL) 50 MG tablet, , Disp: , Rfl:     trolamine salicylate (ASPERCREME) 10 % cream, Apply topically as needed., Disp: , Rfl:     acetaminophen (TYLENOL) 500 MG tablet, Take 1 tablet (500 mg total) by mouth every 6 (six) hours as needed for Pain., Disp: 60 tablet, Rfl: 1    traZODone (DESYREL) 50 MG tablet, Take 1 tablet (50 mg total) by mouth nightly as needed for Insomnia (May take 1-2 tabs nightly as needed for insomnia)., Disp: 30 tablet, Rfl: 1    Past  Surgical History:   Procedure Laterality Date    CATARACT EXTRACTION Left 10/2017     SECTION      CHOLECYSTECTOMY      COLON SURGERY      cystoscope      CYSTOSCOPY W/ RETROGRADES Right 10/10/2019    Procedure: CYSTOSCOPY, WITH RETROGRADE PYELOGRAM;  Surgeon: Gen Isbell MD;  Location: [facility];  Service: Urology;  Laterality: Right;    EYE SURGERY      HYSTERECTOMY  1996    LITHOTRIPSY      LIVER BIOPSY      carcinoid    URETEROSCOPY Right 10/10/2019    Procedure: URETEROSCOPY;  Surgeon: Gen Isbell MD;  Location: [facility];  Service: Urology;  Laterality: Right;    UTERINE FIBROID SURGERY         Family History   Problem Relation Age of Onset    Cancer Mother         unknown    Alzheimer's disease Father     Stroke Sister     No Known Problems Son     No Known Problems Son     No Known Problems Son     No Known Problems Son     Kidney disease Neg Hx        Social History     Socioeconomic History    Marital status:      Spouse name: Not on file    Number of children: Not on file    Years of education: Not on file    Highest education level: Not on file   Occupational History    Occupation: disabled    Social Needs    Financial resource strain: Somewhat hard    Food insecurity:     Worry: Never true     Inability: Never true    Transportation needs:     Medical: Yes     Non-medical: Yes   Tobacco Use    Smoking status: Never Smoker    Smokeless tobacco: Never Used   Substance and Sexual Activity    Alcohol use: No     Alcohol/week: 0.0 standard drinks     Frequency: Never     Binge frequency: Never    Drug use: No    Sexual activity: Not Currently   Lifestyle    Physical activity:     Days per week: 2 days     Minutes per session: 10 min    Stress: Very much   Relationships    Social connections:     Talks on phone: Not on file     Gets together: Not on file     Attends Jewish service: Not on file     Active member of club or  "organization: Not on file     Attends meetings of clubs or organizations: Not on file     Relationship status:    Other Topics Concern    Not on file   Social History Narrative    Pt lives with son           Allergies   Review of patient's allergies indicates:   Allergen Reactions    Epinephrine Anaphylaxis     Can cause  a Carcinoid Crisis    Contrast media Hives, Itching and Swelling    Ibuprofen Hives, Itching and Swelling    Iodinated contrast media     Sulfa (sulfonamide antibiotics) Hives, Itching and Swelling             Review of Systems  See HPI      Physical Exam  /80   Pulse 79   Temp 99.1 °F (37.3 °C)   Ht 5' 6" (1.676 m)   Wt 80.8 kg (178 lb 2.1 oz)   LMP  (LMP Unknown)   SpO2 97%   BMI 28.75 kg/m²     GEN: NAD, well developed, pleasant, well nourished  HEENT: NCAT, PERRLA, EOMI, sclera clear, anicteric, O/P clear, MMM with no lesions  NECK: normal, supple with midline trachea, no LAD, no thyromegaly  LUNGS: CTAB, no w/r/r, no increased work of breathing   HEART: RRR, normal S1 and S2, no m/r/g, no edema  ABD: no generalized abd TTP, soft/non-distended, NABS, no organomegaly, no masses, no hernias, no rebound, no guarding. No RLQ/LLQ TTP. Suprapubic tenderness absent. No CVA tenderness.  SKIN: normal turgor, no rashes  PSYCH: AOx3, appropriate mood and affect  MSK: warm/well perfused, normal ROM in all extremities, no c/c/e.        "

## 2020-03-17 NOTE — PROGRESS NOTES
Outpatient Care Management  Plan of Care Follow Up Visit    Patient: Patito Domingo  MRN: [id number]  Date of Service: 03/17/2020  Completed by: Padmini Clements RN  Referral Date: 11/13/2019  Program: Case Management (High Risk)    Reason for Visit   Patient presents with    Update Plan Of Care    Coordination of care       Brief Summary: Pt called the CM to request assistance with getting into inpt rehab.Pt reports that she did not get much treatment at [facility] in the ER on yesterday for her c/o weakness, diarrhea, and chest pain. Pt reports that she would like to have inpatient rehab due to the recent stroke. Pt reports that she has weakness s/p the stroke. Pt reports that she has an appoint scheduled with a Neurologist on April 8th.Pt reports that she would like to start therapy before 4/8/2020 because she is fearful of having a fall.Pt reports that she received a call from her insurance to inform her about approval for rehab.   Message sent to PCP's staff to notify of pt's request for inpt rehab.                      Patient Summary     Involvement of Care:  Do I have permission to speak with other family members about your care?  yes    Patient Reported Labs & Vitals:  1.  Any Patient Reported Labs & Vitals?     2.  Patient Reported Blood Pressure:     3.  Patient Reported Pulse:     4.  Patient Reported Weight (Kg):     5.  Patient Reported Blood Glucose (mg/dl):       Medical History:  Reviewed medical history with patient and/or caregiver    Social History:  Reviewed social history with patient and/or caregiver    Clinical Assessment     Reviewed and provided basic information on available community resources for mental health, transportation, wellness resources, and palliative care programs with patient and/or caregiver.    Complex Care Plan     Care plan was discussed and completed today with input from patient and/or caregiver.    Goals      Patient/caregiver will accept life style changes to  manage and improve coping due to Metastatic Carcinoid tumor prior to discharge from OPCM. - Priority: High      Overall Time to Completion  2 months from 11/20/2019     OPCM Identified Patient Barriers:  Depression: LSMW; Referred        OPCM Identified Disease Education Barriers:  Hypertension:  Care plan created  Falls: Care plan created          Short Term Goals  Patient/caregiver will verbalize 2 risk factors of Ineffective coping within 1 month.  Interventions   · Assess patient's ability to perform ADLs.  · Assess type of communication barriers.  · Collaborate with Physician as appropriate to meet patient needs.  · Provide contact information for 24 hour Crisis Line number- COPE LINE.  · Recognize and provide educational material (KRAPHILPI).  · Refer to Outpatient Case Management Social Worker.     Status  · Met12/4/19, 12/12/19      Patient/caregiver will verbalize 2 strategies to effectivecoping strategies within 1 month.  Interventions   · Assess type of communication barriers. 1/30/2020  · Collaborate with Physician as appropriate to meet patient needs.  · Facilitate referral for counseling.  · Provide contact information for 24 hour Crisis Line number- [facility] LINE.1/30/2020  · Recognize and provide educational material (REMEDIOS).3/12/2020  · Refer to Outpatient Case Management Social Worker.  Patient/Caregiver agrees to pt will verbalize willingness to consider counseling for coping by  2 weeks.  · Patient/Caregiver agrees to OPCM follow up within 2 weeks to assess progress to goal. 3/12/2020  ·      Status  · Partially met           Clinical Reference Documents Added to Patient Instructions       Document    CANCER, RESOURCES FOR PEOPLE WITH (ENGLISH)    FALLS IN THE HOME, PREVENTING (ENGLISH)    FALLS, PREVENTING, MAKE YOUR HEALTH A PRIORITY (ENGLISH)    ONCOLOGY: COMMUNICATING WITH OTHERS  (ENGLISH)    WEAKNESS (UNCERTAIN CAUSE) (ENGLISH)             Patient/caregiver will accept life style changes to  manage and improve Diarrhea prior to discharge from OPCM. - Priority: High      Overall Time to Completion  2 weeks from 01/16/2020     OPCM Identified Patient Needs:    Depression: LSMW; Referred        OPCM Identified Disease Education Barriers:  Hypertension:  Care plan created  Falls: Care plan created       Short Term Goals  Patient/caregiver will verbalize 2 risk factors of Diarrhea within 2 weeks.  Interventions   · Assess patient's ability to perform ADLs.1/30/2020  · Collaborate with Physician as appropriate to meet patient needs.1/16/2020  · Recognize and provide educational material (KRAMES).  ·   Patient/Caregiver agrees to seek medical care by  3/16/2020.  · Patient/Caregiver agrees to OPCM follow up within 3-5 days to assess progress to goal. 3/16/2020  ·      Status  · Met 1/30/2020  · Resumed, 3/16/2020      Patient/caregiver will verbalize 2 ways of preventing complications due to disease process within 2 weeks.  Interventions   · Assess patient's ability to perform ADLs.1/30/2020  · Collaborate with Physician as appropriate to meet patient needs.  · Encourage Dietary Compliance.  · Encourage Medication Compliance.  · Recognize and provide educational material (KRAMES).     Status  · Met 1/30/2020             Patient/caregiver will accept life style changes to manage and improve risk of Falls prior to discharge from OPCM. - Priority: High      Overall Time to Completion  2 months from 11/20/2019     OPCM Identified Patient Barriers:    Depression: LSMW; Referred        OPCM Identified Disease Education Barriers:  Hypertension:  Care plan created  Falls: Care plan created                     Short Term Goals  Patient/caregiver will verbalize 2 risk factors of Falls within 1 month.  Interventions   · Assess patient's ability to perform ADLs. 3/6/2020  · Collaborate with Physician as appropriate to meet patient needs.  · Discuss alternate Levels of Care with patient/caregiver. 3/6/2020  · Encourage  Exercise.  · Facilitate referral to Outpatient Rehab.Deferred  · Recognize and provide educational material (REMEDIOS).3/6/2020  Patient/Caregiver agrees to utilize walker when ambulating by  1 week.  · Patient/Caregiver agrees to OPCM follow up within 1 week to assess progress to goal. 3/6/2020  ·      Status  · Met 3/12/2020      Patient/caregiver will verbalize 2 strategies to rest breaks and manage fatigue within 1 month.  Interventions   · Assess patient's ability to perform ADLs.3/12/2020  · Collaborate with Physician as appropriate to meet patient needs.  · Recognize and provide educational material (REMEDIOS).3/6/2020     Status  · Met 3/12/2020           Clinical Reference Documents Added to Patient Instructions       Document    CANCER, RESOURCES FOR PEOPLE WITH (ENGLISH)    FALLS IN THE HOME, PREVENTING (ENGLISH)    FALLS, PREVENTING, MAKE YOUR HEALTH A PRIORITY (ENGLISH)    ONCOLOGY: COMMUNICATING WITH OTHERS  (ENGLISH)    WEAKNESS (UNCERTAIN CAUSE) (ENGLISH)                  Patient Instructions     Instructions were provided via the [facility] patient resources and are available for the patient to view on the patient portal.    Next Steps: F/U on placement with inpt rehab                      Collaborate with former LMSW assigned to the case  RN Follow-up 1-2 weeks     Todays OPCM Self-Management Care Plan was developed with the patients/caregivers input and was based on identified barriers from todays assessment.  Goals were written today with the patient/caregiver and the patient has agreed to work towards these goals to improve his/her overall well-being. Patient verbalized understanding of the care plan, goals, and all of today's instructions. Encouraged patient/caregiver to communicate with his/her physician and health care team about health conditions and the treatment plan.  Provided my contact information today and encouraged patient/caregiver to call me with any questions as needed.

## 2020-03-20 ENCOUNTER — TELEPHONE (OUTPATIENT)
Dept: FAMILY MEDICINE | Facility: CLINIC | Age: 70
End: 2020-03-20

## 2020-03-20 DIAGNOSIS — J30.9 ACUTE ALLERGIC RHINITIS: Primary | ICD-10-CM

## 2020-03-20 RX ORDER — LEVOCETIRIZINE DIHYDROCHLORIDE 5 MG/1
5 TABLET, FILM COATED ORAL NIGHTLY
Qty: 30 TABLET | Refills: 0 | Status: SHIPPED | OUTPATIENT
Start: 2020-03-20 | End: 2020-04-15

## 2020-03-20 RX ORDER — FLUTICASONE PROPIONATE 50 MCG
SPRAY, SUSPENSION (ML) NASAL
Qty: 16 G | Refills: 0 | Status: SHIPPED | OUTPATIENT
Start: 2020-03-20 | End: 2020-04-15

## 2020-03-20 NOTE — TELEPHONE ENCOUNTER
Patient states that she is having scratchy throat, sneezing, and a cough for a couple of days and she wants an antibiotic called into the pharmacy.

## 2020-03-20 NOTE — TELEPHONE ENCOUNTER
----- Message from Donn Flores, Patient Care Assistant sent at 3/20/2020 11:32 AM CDT -----  Contact: ALEXIS GORDON [[id number]]  Name of Who is Calling: ALEIXS GORDON [[id number]]    What is the request in detail: Requesting a call back in regards of getting antibiotics for a cough. Please contact to further discuss and advise      Can the clinic reply by [facility]: No    [facility]   [phone]

## 2020-03-20 NOTE — TELEPHONE ENCOUNTER
----- Message from Shirley Yuan sent at 3/20/2020  1:53 PM CDT -----  Contact: ALEXIS GORDON [[id number]]  Type:  Patient Returning Call    Who Called: ALEXIS GORDON [[id number]]    Who Left Message for Patient: Queenie WALLIS [place]    Does the patient know what this is regarding?: yes    Would the patient rather a call back or a response via [facility]? Call    Best Call Back Number: [phone] (home)     Additional Information:

## 2020-03-20 NOTE — TELEPHONE ENCOUNTER
Spoke with patient and informed her that she may be suffering from acute allergies at this time given those symptoms so an antibiotic is likely not indicated. However, I would like to recommend the following medications/remedies to try first which is xyzal 5 mg qpm and flonase nasal spray. Patient agrees to xyzal, but states that flonase made her  nose bleed so she doesn't want to try the flonase.

## 2020-03-20 NOTE — TELEPHONE ENCOUNTER
Patient may be suffering from acute allergies at this time given those symptoms so an antibiotic is likely not indicated. However, I would like to recommend the following medications/remedies to try first:    1) XYZAL 5 mg every evening  2) FLONASE 1 spray in each nostril one to two times daily     Medications have been sent to [facility]

## 2020-03-26 ENCOUNTER — OUTPATIENT CASE MANAGEMENT (OUTPATIENT)
Dept: ADMINISTRATIVE | Facility: OTHER | Age: 70
End: 2020-03-26

## 2020-03-26 NOTE — PROGRESS NOTES
Outpatient Care Management  Plan of Care Follow Up Visit    Patient: Patito Domingo  MRN: [id number]  Date of Service: 03/26/2020  Completed by: Padmini Clements RN  Referral Date: 11/13/2019  Program: Case Management (High Risk)    Reason for Visit   Patient presents with    Update Plan Of Care       Brief Summary: Pt reports that she received her pain medicine from the pain management doctor. Pt reports that she feels better. Pt reports that the diarrhea is resolving. Pt reports that she was started on pantoprazole. Pt reports that she knows that the pain that she experiences is from the cancer. Pt reports that she realizes that she will have to be find a better way to describe the pain. Pt reports that she has a concern about the Coronavirus. Pt reports that she is staying at home. Informed pt of case closure in one week. Pt verbalized understanding.  Education on COVID 19 provided to patient during call. Instructed patient to call [facility] first if experiencing fever greater than 100.4, coughing and SOB. Provided patient [facility] phone number [phone] as well as [facility] COVID 19 hotline [phone], verbalized understanding. Informed patient that they may contact their PCP for further guidance as well. Reviewed with patient precautions to take to help prevent the spread of this respiratory virus: Wash hands often (for 20 seconds singing Happy Birthday), cover your cough or sneeze into your elbow or a tissue, stay away from people who are sick, don't touch your eyes, nose or mouth with unwashed hands. Provided Instruction to stay home as directed by local government officials and only make trips that are of essential needs (Ex:grocery, pharmacy, medical appointments). Reviewed with patient supplies to have on hand while staying at home (food, water, medications, medical supplies and other essentials).                Patient Summary     Involvement of Care:  Do I have permission to  speak with other family members about your care?  yes    Patient Reported Labs & Vitals:  1.  Any Patient Reported Labs & Vitals?     2.  Patient Reported Blood Pressure:     3.  Patient Reported Pulse:     4.  Patient Reported Weight (Kg):     5.  Patient Reported Blood Glucose (mg/dl):       Medical History:  Reviewed medical history with patient and/or caregiver    Social History:  Reviewed social history with patient and/or caregiver    Clinical Assessment     Reviewed and provided basic information on available community resources for mental health, transportation, wellness resources, and palliative care programs with patient and/or caregiver.    Complex Care Plan     Care plan was discussed and completed today with input from patient and/or caregiver.    Goals      Patient/caregiver will accept life style changes to manage and improve coping due to Metastatic Carcinoid tumor prior to discharge from OPCM. - Priority: High      Overall Time to Completion  2 months from 11/20/2019     OPCM Identified Patient Barriers:  Depression: LSMW; Referred        OPCM Identified Disease Education Barriers:  Hypertension:  Care plan created  Falls: Care plan created          Short Term Goals  Patient/caregiver will verbalize 2 risk factors of Ineffective coping within 1 month.  Interventions   · Assess patient's ability to perform ADLs.  · Assess type of communication barriers.  · Collaborate with Physician as appropriate to meet patient needs.  · Provide contact information for 24 hour Crisis Line number- [facility] LINE.  · Recognize and provide educational material (REMEDIOS).  · Refer to Outpatient Case Management Social Worker.     Status  · Met12/4/19, 12/12/19      Patient/caregiver will verbalize 2 strategies to effectivecoping strategies within 1 month.  Interventions   · Assess type of communication barriers. 1/30/2020  · Collaborate with Physician as appropriate to meet patient needs.  · Facilitate referral for  counseling.  · Provide contact information for 24 hour Crisis Line number- [facility] LINE.1/30/2020  · Recognize and provide educational material (KRAMES).3/12/2020  · Refer to Outpatient Case Management Social Worker.  Patient/Caregiver agrees to pt will verbalize willingness to consider counseling for coping by  2 weeks.  · Patient/Caregiver agrees to OPCM follow up within 2 weeks to assess progress to goal. 3/12/2020  ·      Status  · Met 3/26/2020           Clinical Reference Documents Added to Patient Instructions       Document    CANCER, RESOURCES FOR PEOPLE WITH (ENGLISH)    FALLS IN THE HOME, PREVENTING (ENGLISH)    FALLS, PREVENTING, MAKE YOUR HEALTH A PRIORITY (ENGLISH)    ONCOLOGY: COMMUNICATING WITH OTHERS  (ENGLISH)    WEAKNESS (UNCERTAIN CAUSE) (ENGLISH)             Patient/caregiver will accept life style changes to manage and improve Diarrhea prior to discharge from OPCM. - Priority: High      Overall Time to Completion  2 weeks from 01/16/2020     OPCM Identified Patient Needs:    Depression: LSMW; Referred        OPCM Identified Disease Education Barriers:  Hypertension:  Care plan created  Falls: Care plan created       Short Term Goals  Patient/caregiver will verbalize 2 risk factors of Diarrhea within 2 weeks.  Interventions   · Assess patient's ability to perform ADLs.1/30/2020  · Collaborate with Physician as appropriate to meet patient needs.1/16/2020  · Recognize and provide educational material (KRAMES).  ·   Patient/Caregiver agrees to seek medical care by  3/16/2020.  · Patient/Caregiver agrees to OPCM follow up within 3-5 days to assess progress to goal. 3/16/2020  ·      Status  · Met 1/30/2020  · Resumed, 3/16/2020, Met 3/26/2020      Patient/caregiver will verbalize 2 ways of preventing complications due to disease process within 2 weeks.  Interventions   · Assess patient's ability to perform ADLs.1/30/2020  · Collaborate with Physician as appropriate to meet patient needs.  · Encourage  Dietary Compliance.  · Encourage Medication Compliance.  · Recognize and provide educational material (REMEDIOS).     Status  · Met 1/30/2020             Patient/caregiver will accept life style changes to manage and improve risk of Falls prior to discharge from OPCM. - Priority: High      Overall Time to Completion  2 months from 11/20/2019     OPCM Identified Patient Barriers:    Depression: LSMW; Referred        OPCM Identified Disease Education Barriers:  Hypertension:  Care plan created  Falls: Care plan created                     Short Term Goals  Patient/caregiver will verbalize 2 risk factors of Falls within 1 month.  Interventions   · Assess patient's ability to perform ADLs. 3/6/2020  · Collaborate with Physician as appropriate to meet patient needs.  · Discuss alternate Levels of Care with patient/caregiver. 3/6/2020  · Encourage Exercise.  · Facilitate referral to Outpatient Rehab.Deferred  · Recognize and provide educational material (REMEDIOS).3/6/2020  Patient/Caregiver agrees to utilize walker when ambulating by  1 week.  · Patient/Caregiver agrees to OPCM follow up within 1 week to assess progress to goal. 3/6/2020  ·      Status  · Met 3/12/2020      Patient/caregiver will verbalize 2 strategies to rest breaks and manage fatigue within 1 month.  Interventions   · Assess patient's ability to perform ADLs.3/12/2020  · Collaborate with Physician as appropriate to meet patient needs.  · Recognize and provide educational material (REMEDIOS).3/6/2020     Status  · Met 3/12/2020           Clinical Reference Documents Added to Patient Instructions       Document    CANCER, RESOURCES FOR PEOPLE WITH (ENGLISH)    FALLS IN THE HOME, PREVENTING (ENGLISH)    FALLS, PREVENTING, MAKE YOUR HEALTH A PRIORITY (ENGLISH)    ONCOLOGY: COMMUNICATING WITH OTHERS  (ENGLISH)    WEAKNESS (UNCERTAIN CAUSE) (ENGLISH)                  Patient Instructions     Instructions were provided via the [employer] patient resources and are  available for the patient to view on the patient portal.    Next Steps: Pending case closure                      Assess pt's coping of cancer and pain    Follow up in about 1 week (around 4/2/2020) for RN Follow.      Todays OPCM Self-Management Care Plan was developed with the patients/caregivers input and was based on identified barriers from todays assessment.  Goals were written today with the patient/caregiver and the patient has agreed to work towards these goals to improve his/her overall well-being. Patient verbalized understanding of the care plan, goals, and all of today's instructions. Encouraged patient/caregiver to communicate with his/her physician and health care team about health conditions and the treatment plan.  Provided my contact information today and encouraged patient/caregiver to call me with any questions as needed.

## 2020-03-31 ENCOUNTER — TELEPHONE (OUTPATIENT)
Dept: NEUROLOGY | Facility: HOSPITAL | Age: 70
End: 2020-03-31

## 2020-03-31 NOTE — TELEPHONE ENCOUNTER
spoke to pt to inform pt that the appt has changed to 6/18/20 ay 1100. Pt verbalized understanding

## 2020-04-02 ENCOUNTER — OUTPATIENT CASE MANAGEMENT (OUTPATIENT)
Dept: ADMINISTRATIVE | Facility: OTHER | Age: 70
End: 2020-04-02

## 2020-04-02 NOTE — PROGRESS NOTES
Outpatient Care Management  Plan of Care Follow Up Visit    Patient: Patito Domingo  MRN: [id number]  Date of Service: 04/02/2020  Completed by: Padmini Clements RN  Referral Date: 11/13/2019  Program: Case Management (High Risk)    Reason for Visit   Patient presents with    Update Plan Of Care    Case Closure       Brief Summary: Pt reports that the diarrhea is much better. Reports that she is taking the imodium and  metamucil as prescribed. Pt with no complaint of pain verbalized at this time. Pt is calm with no crying during this call.  Pt verbalized up coming appt in June with the [facility]. No further needs reported at this time. Informed pt of case closure.    Patient Summary     Involvement of Care:  Do I have permission to speak with other family members about your care?   Na    Patient Reported Labs & Vitals:  1.  Any Patient Reported Labs & Vitals?     2.  Patient Reported Blood Pressure:     3.  Patient Reported Pulse:     4.  Patient Reported Weight (Kg):     5.  Patient Reported Blood Glucose (mg/dl):       Medical History:  Reviewed medical history with patient and/or caregiver    Social History:  Reviewed social history with patient and/or caregiver    Clinical Assessment     Reviewed and provided basic information on available community resources for mental health, transportation, wellness resources, and palliative care programs with patient and/or caregiver.    Complex Care Plan     Care plan was discussed and completed today with input from patient and/or caregiver.    Goals      Patient/caregiver will accept life style changes to manage and improve coping due to Metastatic Carcinoid tumor prior to discharge from OPCM. - Priority: High      Overall Time to Completion  2 months from 11/20/2019     OPCM Identified Patient Barriers:  Depression: LSMW; Referred        OPCM Identified Disease Education Barriers:  Hypertension:  Care plan created  Falls: Care plan created           Short Term Goals  Patient/caregiver will verbalize 2 risk factors of Ineffective coping within 1 month.  Interventions   · Assess patient's ability to perform ADLs.  · Assess type of communication barriers.  · Collaborate with Physician as appropriate to meet patient needs.  · Provide contact information for 24 hour Crisis Line number- [facility]E LINE.  · Recognize and provide educational material (KRAMES).  · Refer to Outpatient Case Management Social Worker.     Status  · Met12/4/19, 12/12/19      Patient/caregiver will verbalize 2 strategies to effectivecoping strategies within 1 month.  Interventions   · Assess type of communication barriers. 1/30/2020  · Collaborate with Physician as appropriate to meet patient needs.  · Facilitate referral for counseling.  · Provide contact information for 24 hour Crisis Line number- [facility] LINE.1/30/2020  · Recognize and provide educational material (KRAMES).3/12/2020  · Refer to Outpatient Case Management Social Worker.  Patient/Caregiver agrees to pt will verbalize willingness to consider counseling for coping by  2 weeks.  · Patient/Caregiver agrees to OPCM follow up within 2 weeks to assess progress to goal. 3/12/2020  ·      Status  · Met 3/26/2020           Clinical Reference Documents Added to Patient Instructions       Document    CANCER, RESOURCES FOR PEOPLE WITH (ENGLISH)    FALLS IN THE HOME, PREVENTING (ENGLISH)    FALLS, PREVENTING, MAKE YOUR HEALTH A PRIORITY (ENGLISH)    ONCOLOGY: COMMUNICATING WITH OTHERS  (ENGLISH)    WEAKNESS (UNCERTAIN CAUSE) (ENGLISH)             Patient/caregiver will accept life style changes to manage and improve Diarrhea prior to discharge from OPCM. - Priority: High      Overall Time to Completion  2 weeks from 01/16/2020     OPCM Identified Patient Needs:    Depression: LSMW; Referred        OPCM Identified Disease Education Barriers:  Hypertension:  Care plan created  Falls: Care plan created       Short Term  Goals  Patient/caregiver will verbalize 2 risk factors of Diarrhea within 2 weeks.  Interventions   · Assess patient's ability to perform ADLs.1/30/2020  · Collaborate with Physician as appropriate to meet patient needs.1/16/2020  · Recognize and provide educational material (REMEDIOS).  ·   Patient/Caregiver agrees to seek medical care by  3/16/2020.  · Patient/Caregiver agrees to OPCM follow up within 3-5 days to assess progress to goal. 3/16/2020  ·      Status  · Met 1/30/2020  · Resumed, 3/16/2020, Met 3/26/2020      Patient/caregiver will verbalize 2 ways of preventing complications due to disease process within 2 weeks.  Interventions   · Assess patient's ability to perform ADLs.1/30/2020  · Collaborate with Physician as appropriate to meet patient needs.  · Encourage Dietary Compliance.  · Encourage Medication Compliance.  · Recognize and provide educational material (REMEDIOS).     Status  · Met 1/30/2020             Patient/caregiver will accept life style changes to manage and improve risk of Falls prior to discharge from OPCM. - Priority: High      Overall Time to Completion  2 months from 11/20/2019     OPCM Identified Patient Barriers:    Depression: LSMW; Referred        OPCM Identified Disease Education Barriers:  Hypertension:  Care plan created  Falls: Care plan created                     Short Term Goals  Patient/caregiver will verbalize 2 risk factors of Falls within 1 month.  Interventions   · Assess patient's ability to perform ADLs. 3/6/2020  · Collaborate with Physician as appropriate to meet patient needs.  · Discuss alternate Levels of Care with patient/caregiver. 3/6/2020  · Encourage Exercise.  · Facilitate referral to Outpatient Rehab.Deferred  · Recognize and provide educational material (REMEDIOS).3/6/2020  Patient/Caregiver agrees to utilize walker when ambulating by  1 week.  · Patient/Caregiver agrees to OPCM follow up within 1 week to assess progress to goal. 3/6/2020  ·       Status  · Met 3/12/2020      Patient/caregiver will verbalize 2 strategies to rest breaks and manage fatigue within 1 month.  Interventions   · Assess patient's ability to perform ADLs.3/12/2020  · Collaborate with Physician as appropriate to meet patient needs.  · Recognize and provide educational material (REMEDIOS).3/6/2020     Status  · Met 3/12/2020           Clinical Reference Documents Added to Patient Instructions       Document    CANCER, RESOURCES FOR PEOPLE WITH (ENGLISH)    FALLS IN THE HOME, PREVENTING (ENGLISH)    FALLS, PREVENTING, MAKE YOUR HEALTH A PRIORITY (ENGLISH)    ONCOLOGY: COMMUNICATING WITH OTHERS  (ENGLISH)    WEAKNESS (UNCERTAIN CAUSE) (ENGLISH)                  Patient Instructions     Instructions were provided via the [email or web address] patient resources and are available for the patient to view on the patient portal.    Next Steps:na    No follow-ups on file.      Todays OPCM Self-Management Care Plan was developed with the patients/caregivers input and was based on identified barriers from todays assessment.  Goals were written today with the patient/caregiver and the patient has agreed to work towards these goals to improve his/her overall well-being. Patient verbalized understanding of the care plan, goals, and all of today's instructions. Encouraged patient/caregiver to communicate with his/her physician and health care team about health conditions and the treatment plan.  Provided my contact information today and encouraged patient/caregiver to call me with any questions as needed.

## 2020-04-06 DIAGNOSIS — K52.9 CHRONIC DIARRHEA: ICD-10-CM

## 2020-04-06 RX ORDER — DIPHENOXYLATE HYDROCHLORIDE AND ATROPINE SULFATE 2.5; .025 MG/1; MG/1
1 TABLET ORAL 4 TIMES DAILY PRN
Qty: 30 TABLET | Refills: 0 | Status: SHIPPED | OUTPATIENT
Start: 2020-04-06 | End: 2020-05-04

## 2020-04-14 DIAGNOSIS — R60.0 LOWER EXTREMITY EDEMA: ICD-10-CM

## 2020-04-14 RX ORDER — BUMETANIDE 0.5 MG/1
0.5 TABLET ORAL DAILY PRN
Qty: 30 TABLET | Refills: 0 | Status: SHIPPED | OUTPATIENT
Start: 2020-04-14 | End: 2020-05-10 | Stop reason: SDUPTHER

## 2020-04-15 DIAGNOSIS — J30.9 ACUTE ALLERGIC RHINITIS: ICD-10-CM

## 2020-04-15 RX ORDER — FLUTICASONE PROPIONATE 50 MCG
SPRAY, SUSPENSION (ML) NASAL
Qty: 16 G | Refills: 0 | Status: SHIPPED | OUTPATIENT
Start: 2020-04-15 | End: 2020-05-11

## 2020-04-15 RX ORDER — LEVOCETIRIZINE DIHYDROCHLORIDE 5 MG/1
5 TABLET, FILM COATED ORAL NIGHTLY
Qty: 30 TABLET | Refills: 0 | Status: SHIPPED | OUTPATIENT
Start: 2020-04-15 | End: 2020-05-11

## 2020-04-30 ENCOUNTER — EXTERNAL CHRONIC CARE MANAGEMENT (OUTPATIENT)
Dept: PRIMARY CARE CLINIC | Facility: CLINIC | Age: 70
End: 2020-04-30
Payer: MEDICARE

## 2020-04-30 PROCEDURE — 99490 PR CHRONIC CARE MGMT, 1ST 20 MIN: ICD-10-PCS | Mod: S$GLB,,, | Performed by: FAMILY MEDICINE

## 2020-04-30 PROCEDURE — 99490 CHRNC CARE MGMT STAFF 1ST 20: CPT | Mod: S$GLB,,, | Performed by: FAMILY MEDICINE

## 2020-05-04 DIAGNOSIS — K52.9 CHRONIC DIARRHEA: ICD-10-CM

## 2020-05-04 RX ORDER — DIPHENOXYLATE HYDROCHLORIDE AND ATROPINE SULFATE 2.5; .025 MG/1; MG/1
1 TABLET ORAL 4 TIMES DAILY PRN
Qty: 30 TABLET | Refills: 0 | Status: SHIPPED | OUTPATIENT
Start: 2020-05-04 | End: 2020-07-21 | Stop reason: SDUPTHER

## 2020-05-10 DIAGNOSIS — R60.0 LOWER EXTREMITY EDEMA: ICD-10-CM

## 2020-05-10 RX ORDER — BUMETANIDE 0.5 MG/1
0.5 TABLET ORAL DAILY PRN
Qty: 30 TABLET | Refills: 0 | Status: ON HOLD | OUTPATIENT
Start: 2020-05-10 | End: 2021-03-09

## 2020-05-11 DIAGNOSIS — J30.9 ACUTE ALLERGIC RHINITIS: ICD-10-CM

## 2020-05-11 RX ORDER — LEVOCETIRIZINE DIHYDROCHLORIDE 5 MG/1
5 TABLET, FILM COATED ORAL NIGHTLY
Qty: 30 TABLET | Refills: 0 | Status: SHIPPED | OUTPATIENT
Start: 2020-05-11 | End: 2020-11-05 | Stop reason: SDUPTHER

## 2020-05-11 RX ORDER — FLUTICASONE PROPIONATE 50 MCG
SPRAY, SUSPENSION (ML) NASAL
Qty: 16 G | Refills: 0 | Status: ON HOLD | OUTPATIENT
Start: 2020-05-11 | End: 2021-03-15

## 2020-05-15 ENCOUNTER — TELEPHONE (OUTPATIENT)
Dept: HEMATOLOGY/ONCOLOGY | Facility: CLINIC | Age: 70
End: 2020-05-15

## 2020-05-15 NOTE — TELEPHONE ENCOUNTER
----- Message from Gonzales Bishop MD sent at 5/15/2020  2:20 PM CDT -----  Regarding: Clinic VIsit  Please schedule the patient to see me next week with CBC, CMP, TSH and T4 prior to the appt.

## 2020-05-15 NOTE — TELEPHONE ENCOUNTER
Called the patient concerning scheduling an appointment next week with Dr. Bishop. Patient stated that she loss her sister and in the process of moving to another location. Patient stated that she will call our office next week to schedule blood work and an appointment.

## 2020-05-22 ENCOUNTER — TELEPHONE (OUTPATIENT)
Dept: NEUROLOGY | Facility: HOSPITAL | Age: 70
End: 2020-05-22

## 2020-05-22 NOTE — TELEPHONE ENCOUNTER
"Patient called she said she is "feeling bad" she is a cancer patient and she wants Dr. Perry to "give her something for energy.  Her appt is moved to Monday she read back date and time.   "

## 2020-05-26 ENCOUNTER — TELEPHONE (OUTPATIENT)
Dept: HEMATOLOGY/ONCOLOGY | Facility: CLINIC | Age: 70
End: 2020-05-26

## 2020-05-26 ENCOUNTER — TELEPHONE (OUTPATIENT)
Dept: NEUROLOGY | Facility: HOSPITAL | Age: 70
End: 2020-05-26

## 2020-05-26 NOTE — TELEPHONE ENCOUNTER
----- Message from Bella Franco sent at 5/26/2020  3:52 PM CDT -----  Contact: [phone]/Self   BRIAN  Patient is requesting to speak with nurse regarding her upcoming appt on 06-18-20. Please advise.

## 2020-05-26 NOTE — TELEPHONE ENCOUNTER
I called the patient to schedule a follow up. L/M that I called.        .----- Message from Gonzales Bishop MD sent at 5/26/2020  2:36 PM CDT -----  Regarding: Clinic appt  Please call the patient and have her scheduled to see me next week.

## 2020-05-29 DIAGNOSIS — I1A.0 RESISTANT HYPERTENSION: ICD-10-CM

## 2020-05-29 NOTE — TELEPHONE ENCOUNTER
----- Message from Albaro Puentes sent at 5/29/2020 11:37 AM CDT -----  Contact: ALEXSI GORDON [[id number]]      Can the clinic reply in [facility]: no      Please refill the medication(s) listed below. Please call the patient when the prescription(s) is ready for  at this phone number   [phone]        Medication #1 metoprolol tartrate (LOPRESSOR) 50 MG tablet    Preferred Pharmacy: [facility] [place] [address]

## 2020-05-30 RX ORDER — METOPROLOL TARTRATE 50 MG/1
50 TABLET ORAL 2 TIMES DAILY
Qty: 180 TABLET | Refills: 1 | Status: SHIPPED | OUTPATIENT
Start: 2020-05-30 | End: 2021-01-22 | Stop reason: SDUPTHER

## 2020-05-31 ENCOUNTER — EXTERNAL CHRONIC CARE MANAGEMENT (OUTPATIENT)
Dept: PRIMARY CARE CLINIC | Facility: CLINIC | Age: 70
End: 2020-05-31
Payer: MEDICARE

## 2020-05-31 PROCEDURE — 99490 PR CHRONIC CARE MGMT, 1ST 20 MIN: ICD-10-PCS | Mod: S$GLB,,, | Performed by: INTERNAL MEDICINE

## 2020-05-31 PROCEDURE — 99490 CHRNC CARE MGMT STAFF 1ST 20: CPT | Mod: S$GLB,,, | Performed by: INTERNAL MEDICINE

## 2020-06-05 ENCOUNTER — TELEPHONE (OUTPATIENT)
Dept: FAMILY MEDICINE | Facility: CLINIC | Age: 70
End: 2020-06-05

## 2020-06-05 DIAGNOSIS — R82.90 UNSPECIFIED ABNORMAL FINDINGS IN URINE: ICD-10-CM

## 2020-06-05 DIAGNOSIS — N39.9 URINARY PROBLEM IN FEMALE: Primary | ICD-10-CM

## 2020-06-05 NOTE — TELEPHONE ENCOUNTER
Patient with history of several recurrent, resistant UTIs therefore a urine sample is warranted before I can send her medication/antibiotics -- is patient able to come in for urine sample so that we can prescribe? (I know she sometimes has transportation issues)

## 2020-06-05 NOTE — TELEPHONE ENCOUNTER
Left voice message, please call to schedule a urine culture.  No medication was sent to the pharmacy.

## 2020-06-05 NOTE — TELEPHONE ENCOUNTER
Spoke with the patient and canceled her appointment today.  Patient states she thinks she has a UTI.  I offered an appt with Shirley for 6/20 and patient declined.  Scheduled an appt with .  Patient verbalized understandings.  Please advise      [facility] is the pharmacy

## 2020-06-05 NOTE — TELEPHONE ENCOUNTER
----- Message from Cris Pardo sent at 6/5/2020  8:13 AM CDT -----  Contact: pt  Pt would like a call back in regards to appointment  for today she had to cancel also about  Medication refill    Pt can be reached at [phone]

## 2020-06-10 ENCOUNTER — TELEPHONE (OUTPATIENT)
Dept: FAMILY MEDICINE | Facility: CLINIC | Age: 70
End: 2020-06-10

## 2020-06-10 DIAGNOSIS — N39.9 URINARY PROBLEM IN FEMALE: Primary | ICD-10-CM

## 2020-06-10 DIAGNOSIS — R82.90 UNSPECIFIED ABNORMAL FINDINGS IN URINE: ICD-10-CM

## 2020-06-10 NOTE — TELEPHONE ENCOUNTER
----- Message from Albertina Londono sent at 6/10/2020  4:03 PM CDT -----  Contact: pt  Type: Patient Call Back    Who called:pt    What is the request in detail:pt is calling in regards to urine test. Wants to go to [place] to take it. Call pt    Can the clinic reply by [facility]?    Would the patient rather a call back or a response via [facility]? call    Best call back number:[phone] (home)       Additional Information:

## 2020-06-12 ENCOUNTER — TELEPHONE (OUTPATIENT)
Dept: FAMILY MEDICINE | Facility: CLINIC | Age: 70
End: 2020-06-12

## 2020-06-12 NOTE — TELEPHONE ENCOUNTER
----- Message from Fanny Caceres sent at 6/12/2020  8:52 AM CDT -----  Contact: Patient  [phone]  Type: Patient Call Back    Who called:Patient    What is the request in detail:Calling back with the pharmacy information she would like her medication to be sent to:   .  [facility]  [address], [place] [address]  PH [phone]    Would the patient rather a call back or a response via [facility]? Call back    Best call back number: [phone]

## 2020-06-15 ENCOUNTER — TELEPHONE (OUTPATIENT)
Dept: FAMILY MEDICINE | Facility: CLINIC | Age: 70
End: 2020-06-15

## 2020-06-15 DIAGNOSIS — N30.00 ACUTE CYSTITIS WITHOUT HEMATURIA: ICD-10-CM

## 2020-06-15 DIAGNOSIS — N30.00 ACUTE CYSTITIS WITHOUT HEMATURIA: Primary | ICD-10-CM

## 2020-06-15 RX ORDER — CEPHALEXIN 500 MG/1
500 CAPSULE ORAL EVERY 12 HOURS
Qty: 10 CAPSULE | Refills: 0 | Status: SHIPPED | OUTPATIENT
Start: 2020-06-15 | End: 2020-06-20

## 2020-06-15 RX ORDER — CEPHALEXIN 500 MG/1
500 CAPSULE ORAL EVERY 12 HOURS
Qty: 10 CAPSULE | Refills: 0 | Status: SHIPPED | OUTPATIENT
Start: 2020-06-15 | End: 2020-06-15 | Stop reason: SDUPTHER

## 2020-06-15 NOTE — TELEPHONE ENCOUNTER
Patient notified about results of her urine test. Also let patient know her medication was send to her pharmacy of choice.  Patient verbalized understanding.

## 2020-06-15 NOTE — TELEPHONE ENCOUNTER
----- Message from Emma Cody sent at 6/15/2020 10:45 AM CDT -----  Regarding: Refill Request  Who Called: ALEXIS GORDON [[id number]]    RX Name and Strength: cephALEXin (KEFLEX) 500 MG capsule    Preferred Pharmacy with phone number: [facility] [address] [place] [address]  [phone]    Best Call Back Number: [phone]    Additional Information: Sent to the incorrect pharmacy

## 2020-06-15 NOTE — TELEPHONE ENCOUNTER
----- Message from Fanny Caceres sent at 6/15/2020  9:11 AM CDT -----  Contact: Patient [phone]  Type:  Test Results    Who Called: Patient    Name of Test (Lab/Mammo/Etc): Urine    Date of Test: 06-12-20    Where the test was performed: [facility]    Would the patient rather a call back or a response via [facility]? Call back    Best Call Back Number: [phone]    Additional Information:  Need to know if a Rx will be sent to pharmacy.    For Clinical Team:Has the provider reviewed the results?

## 2020-06-15 NOTE — TELEPHONE ENCOUNTER
----- Message from Pantera Rosen MD sent at 6/15/2020  7:49 AM CDT -----  Please call the patient regarding results:    Patient has UTI - sending KEFLEX to pharmacy for treatment    Let me know if you have any questions or concerns.

## 2020-06-17 ENCOUNTER — TELEPHONE (OUTPATIENT)
Dept: NEUROLOGY | Facility: HOSPITAL | Age: 70
End: 2020-06-17

## 2020-06-17 NOTE — TELEPHONE ENCOUNTER
----- Message from Tamara Dhillon sent at 6/17/2020 10:41 AM CDT -----  Regarding: Appointment  Contact: self [phone]  BRIAN - Patient is calling to speak with you about her appointment patient said she has a UTI and was given medication that have her feeling light headed. Please call

## 2020-06-18 ENCOUNTER — TELEPHONE (OUTPATIENT)
Dept: FAMILY MEDICINE | Facility: CLINIC | Age: 70
End: 2020-06-18

## 2020-06-18 ENCOUNTER — TELEPHONE (OUTPATIENT)
Dept: NEUROLOGY | Facility: HOSPITAL | Age: 70
End: 2020-06-18

## 2020-06-18 NOTE — TELEPHONE ENCOUNTER
Attempt to call patient about the concerns written below no answer , Left message for patient to call office back .

## 2020-06-18 NOTE — TELEPHONE ENCOUNTER
----- Message from Renae Torres sent at 6/18/2020 11:59 AM CDT -----  Regarding: Medication  Contact: Patient  Type: Patient Call Back    Who called: Patient    What is the request in detail: Patient states she cannot sleep and needs medication. Please advise.   Zolpidem (Ambien)    Can the clinic reply by [facility]?  No    Would the patient rather a call back or a response via [facility]? Call    Best call back number: [phone] (home)     Additional Information:    [id number] - [place] [address] [facility] [address] [place] [address] [address] [address]  Phone: [phone] Fax: [phone]

## 2020-06-18 NOTE — TELEPHONE ENCOUNTER
Pt requesting medication to help sleep, trazodone not working. Pt informed request to be forward to Dr. Perry.

## 2020-06-18 NOTE — TELEPHONE ENCOUNTER
----- Message from Christina Oreilly sent at 6/18/2020 12:11 PM CDT -----  Contact: [phone]  BRIAN---Pt is requesting a callback in regards to needing the Doctor to prescribe a medication for Sleeping.    Preferred Pharmacy: [facility] #[id number] [place] [address] AT [facility] [address] [phone] (Phone)  [phone] (Fax)    Please call and advise

## 2020-06-18 NOTE — TELEPHONE ENCOUNTER
----- Message from Albertina Londono sent at 6/18/2020 12:20 PM CDT -----  Regarding: returned call  Type: Patient Call Back    Who called:pt    What is the request in detail:pt returned the nurse's phone call. call    Can the clinic reply by [facility]?    Would the patient rather a call back or a response via [facility]? call    Best call back number:[phone] (home)       Additional Information:

## 2020-06-19 NOTE — TELEPHONE ENCOUNTER
Pt informed, per Dr. Perry, please request sleep medication from pcp.  Pt states request made to pcp.

## 2020-06-20 NOTE — PROGRESS NOTES
[address] [place] [address]  Dept: [phone]  Dept Fax: [phone]  Loc: [phone]    Rush Monzon is a 41 y.o. female who presents today for her medical conditions/complaints as noted below. Rush Monzon is c/o of   Chief Complaint   Patient presents with    Other     swelling to right side of face, painful, bruised feeling. started tuesday       HPI:     HPI Here today for pain on her face. She first noticed a sore area on her face on 6/16 after blowing metal shavings off of her block at work. This has happened before that the shavings knick her face and she gets a small pimple. This time though it because a larger cystic area. She has been using ice for the swelling and the pain. It is very tender and swollen. She has not had any issues with fevers. No issues with shortness of breath or trouble swallowing. She also has a swollen submental lymph node. No ear pain. She is having surgery next week for her foot.       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          Social History     Tobacco Use    Smoking status: Current Every Day Smoker     Packs/day: 1.00     Years: 25.00     Pack years: 25.00     Start date: 1/1/1995    Smokeless tobacco: Never Used    Tobacco comment: vaping   Substance Use Topics    Alcohol use: No     Frequency: Never     Binge frequency: Never     Comment: recovering alcholic quit june 07,2922     Current Outpatient Medications   Medication Sig Dispense Refill    doxycycline hyclate (VIBRA-TABS) 100 MG tablet Take 1 tablet by mouth 2 times daily 20 tablet 0    nicotine (NICODERM CQ) 14 MG/24HR Place 1 Pt reports that she is in the Emergency Room at [facility].   Thyroid: No thyromegaly. Cardiovascular:      Rate and Rhythm: Normal rate and regular rhythm. Heart sounds: Normal heart sounds. No murmur. Pulmonary:      Effort: Pulmonary effort is normal. No respiratory distress. Breath sounds: Normal breath sounds. No wheezing. Lymphadenopathy:      Cervical: No cervical adenopathy. Skin:     General: Skin is warm and dry. Findings: No erythema or rash. Neurological:      Mental Status: She is alert and oriented to person, place, and time. BP 90/64   Pulse 62   Temp 98.6 °F (37 °C)   Resp 16   Ht 5' 5\" (1.651 m)   Wt 155 lb (70.3 kg)   SpO2 98%   BMI 25.79 kg/m²     Assessment:       Diagnosis Orders   1. Cellulitis of face               Plan:        Facial cellulitis: new; I will treat with doxycyline. She was advised not to try to pop the area and to call if she does not notice any improvement. This infection should not delay her surgery. Return if symptoms worsen or fail to improve. Orders Placed This Encounter   Medications    doxycycline hyclate (VIBRA-TABS) 100 MG tablet     Sig: Take 1 tablet by mouth 2 times daily     Dispense:  20 tablet     Refill:  0    nicotine (NICODERM CQ) 14 MG/24HR     Sig: Place 1 patch onto the skin every 24 hours     Dispense:  42 patch     Refill:  0    nicotine (NICODERM CQ) 7 MG/24HR     Sig: Place 1 patch onto the skin every 24 hours Start after you finish the 14mg patches     Dispense:  30 patch     Refill:  0       Patientgiven educational materials - see patient instructions. Discussed use, benefit,and side effects of prescribed medications. All patient questions answered. Ptvoiced understanding. Reviewed health maintenance. Instructed to continue currentmedications, diet and exercise. Patient agreed with treatment plan. Follow up asdirected.      Electronically signed by Deo Hicks MD on 6/20/2020 at 2:38 PM

## 2020-06-23 ENCOUNTER — TELEPHONE (OUTPATIENT)
Dept: NEUROLOGY | Facility: HOSPITAL | Age: 70
End: 2020-06-23

## 2020-06-23 NOTE — TELEPHONE ENCOUNTER
Spoke with this pt and she had not had her Lanreotide injection. Told her to reschedule for the shot then we will set up an appt with Dr. Perry. I told her that he wants to see how she does on this shot. This was the reason for the appt with Dr. Perry. Pt verbalized understanding

## 2020-06-25 ENCOUNTER — TELEPHONE (OUTPATIENT)
Dept: HEMATOLOGY/ONCOLOGY | Facility: CLINIC | Age: 70
End: 2020-06-25

## 2020-06-25 NOTE — TELEPHONE ENCOUNTER
Called patient in regards to follow up with Dr. Bishop tomorrow. Patient needs lab work done before appt. No answer. Left voice message.

## 2020-06-26 ENCOUNTER — TELEPHONE (OUTPATIENT)
Dept: FAMILY MEDICINE | Facility: CLINIC | Age: 70
End: 2020-06-26

## 2020-06-26 NOTE — TELEPHONE ENCOUNTER
----- Message from Scarlett Eleno sent at 6/26/2020  3:03 PM CDT -----  Type: Patient Call Back    Who called: Self     What is the request in detail: calling to check the status on homehealth. Pt stated she's still having issues with weakness. Would like to speak with  Regarding this.     Can the clinic reply by [facility]? Call back     Would the patient rather a call back or a response via [facility]? Call back     Best call back number: [phone]

## 2020-06-30 ENCOUNTER — EXTERNAL CHRONIC CARE MANAGEMENT (OUTPATIENT)
Dept: PRIMARY CARE CLINIC | Facility: CLINIC | Age: 70
End: 2020-06-30
Payer: MEDICARE

## 2020-06-30 PROCEDURE — 99490 PR CHRONIC CARE MGMT, 1ST 20 MIN: ICD-10-PCS | Mod: S$GLB,,, | Performed by: INTERNAL MEDICINE

## 2020-06-30 PROCEDURE — 99490 CHRNC CARE MGMT STAFF 1ST 20: CPT | Mod: S$GLB,,, | Performed by: INTERNAL MEDICINE

## 2020-07-01 ENCOUNTER — TELEPHONE (OUTPATIENT)
Dept: FAMILY MEDICINE | Facility: CLINIC | Age: 70
End: 2020-07-01

## 2020-07-01 NOTE — TELEPHONE ENCOUNTER
----- Message from Ifeoma Douglas sent at 7/1/2020  2:30 PM CDT -----  Contact: Self [phone]  Type: RX Refill Request    Who Called: Self    Have you contacted your pharmacy:    Refill or New Rx:refill    RX Name and Strength: pantoprazole (PROTONIX) 40 mg GrPS    Preferred Pharmacy with phone number:   [facility] #[id number] - [place] [address] AT [facility] [address] [address]  Phone: [phone] Fax: [phone]    Local or Mail Order: Local    Would the patient rather a call back or a response via [facility]? Call back    Best Call Back Number: [phone]

## 2020-07-01 NOTE — TELEPHONE ENCOUNTER
Called patient to address her issues .Patient wanted a refill on a medication that another doctor prescribed her.  Informed patient that she would have to call the physician who prescribed her that medication we can not refill it for  her. Patient verbalize understanding

## 2020-07-02 ENCOUNTER — PATIENT OUTREACH (OUTPATIENT)
Dept: ADMINISTRATIVE | Facility: HOSPITAL | Age: 70
End: 2020-07-02

## 2020-07-02 ENCOUNTER — TELEPHONE (OUTPATIENT)
Dept: FAMILY MEDICINE | Facility: CLINIC | Age: 70
End: 2020-07-02

## 2020-07-02 DIAGNOSIS — Z12.39 BREAST CANCER SCREENING: Primary | ICD-10-CM

## 2020-07-02 DIAGNOSIS — Z12.31 ENCOUNTER FOR SCREENING MAMMOGRAM FOR MALIGNANT NEOPLASM OF BREAST: ICD-10-CM

## 2020-07-07 ENCOUNTER — TELEPHONE (OUTPATIENT)
Dept: NEUROLOGY | Facility: HOSPITAL | Age: 70
End: 2020-07-07

## 2020-07-10 ENCOUNTER — OFFICE VISIT (OUTPATIENT)
Dept: FAMILY MEDICINE | Facility: CLINIC | Age: 70
End: 2020-07-10
Payer: MEDICARE

## 2020-07-10 DIAGNOSIS — R10.9 ABDOMINAL PAIN, ACUTE: ICD-10-CM

## 2020-07-10 DIAGNOSIS — R35.0 URINARY FREQUENCY: ICD-10-CM

## 2020-07-10 DIAGNOSIS — C7B.02 METASTATIC MALIGNANT CARCINOID TUMOR TO LIVER: Primary | ICD-10-CM

## 2020-07-10 PROCEDURE — 99442 PR PHYSICIAN TELEPHONE EVALUATION 11-20 MIN: CPT | Mod: HCNC,95,, | Performed by: INTERNAL MEDICINE

## 2020-07-10 PROCEDURE — 99442 PR PHYSICIAN TELEPHONE EVALUATION 11-20 MIN: ICD-10-PCS | Mod: HCNC,95,, | Performed by: INTERNAL MEDICINE

## 2020-07-10 NOTE — PROGRESS NOTES
Established Patient - Audio Only Telehealth Visit     The patient location is: [place]   The chief complaint leading to consultation is: urinary problem/diarrhea/abdominal pain  Visit type: Virtual visit with audio only (telephone)  Total time spent with patient: 15 minutes     The reason for the audio only service rather than synchronous audio and video virtual visit was related to technical difficulties or patient preference/necessity.     Each patient to whom I provide medical services by telemedicine is:  (1) informed of the relationship between the physician and patient and the respective role of any other health care provider with respect to management of the patient; and (2) notified that they may decline to receive medical services by telemedicine and may withdraw from such care at any time. Patient verbally consented to receive this service via voice-only telephone call.       HPI: patient with urinary frequency, dark yellow urine, abdominal/flank pain for several days - worsening in nature. History of recurrent UTI - last diagnosed in June 2020 with E. Coli/P. Mirabilis infection treatment with Keflex. Diarrhea as well currently - this is a recurrent problem. Given severity of pain,she is wondering about going to the ED      Assessment and plan:  Discussed patient's symptoms and apparent distress over the phone - given inability to assess further at this time recommend ED evaluation given her high risk for severe pathology (h/o metastatic carcinoid) - patient expresses understanding - to see if daughter can bring her to [facility] or will call us      This service was not originating from a related E/M service provided within the previous 7 days nor will  to an E/M service or procedure within the next 24 hours or my soonest available appointment.  Prevailing standard of care was able to be met in this audio-only visit.      Pantera Rosen MD  Internal Medicine-Pediatrics

## 2020-07-13 ENCOUNTER — TELEPHONE (OUTPATIENT)
Dept: HEMATOLOGY/ONCOLOGY | Facility: CLINIC | Age: 70
End: 2020-07-13

## 2020-07-13 NOTE — TELEPHONE ENCOUNTER
I called the patient to get her scheduled. The patient states that she will call back when she is ready to schedule.

## 2020-07-14 ENCOUNTER — PES CALL (OUTPATIENT)
Dept: ADMINISTRATIVE | Facility: CLINIC | Age: 70
End: 2020-07-14

## 2020-07-17 ENCOUNTER — TELEPHONE (OUTPATIENT)
Dept: FAMILY MEDICINE | Facility: CLINIC | Age: 70
End: 2020-07-17

## 2020-07-17 NOTE — TELEPHONE ENCOUNTER
----- Message from Ifeoma Douglas sent at 7/17/2020  2:17 PM CDT -----  Contact: Self [phone]  Type: Patient Call Back    Who called: Self    What is the request in detail: pt is calling because she states that she has been trying to get home health since June and she has not heard from anyone about setting it up and she is wondering what is going on    Can the clinic reply by [facility]? Call back    Would the patient rather a call back or a response via [facility]? Call back    Best call back number: [phone]

## 2020-07-21 ENCOUNTER — OFFICE VISIT (OUTPATIENT)
Dept: FAMILY MEDICINE | Facility: CLINIC | Age: 70
End: 2020-07-21
Payer: MEDICARE

## 2020-07-21 DIAGNOSIS — N20.0 STAGHORN CALCULUS: ICD-10-CM

## 2020-07-21 DIAGNOSIS — K21.9 GASTROESOPHAGEAL REFLUX DISEASE, ESOPHAGITIS PRESENCE NOT SPECIFIED: Primary | ICD-10-CM

## 2020-07-21 DIAGNOSIS — K52.9 CHRONIC DIARRHEA: ICD-10-CM

## 2020-07-21 PROCEDURE — 99442 PR PHYSICIAN TELEPHONE EVALUATION 11-20 MIN: ICD-10-PCS | Mod: HCNC,95,, | Performed by: INTERNAL MEDICINE

## 2020-07-21 PROCEDURE — 99442 PR PHYSICIAN TELEPHONE EVALUATION 11-20 MIN: CPT | Mod: HCNC,95,, | Performed by: INTERNAL MEDICINE

## 2020-07-21 RX ORDER — DIPHENOXYLATE HYDROCHLORIDE AND ATROPINE SULFATE 2.5; .025 MG/1; MG/1
1 TABLET ORAL 4 TIMES DAILY PRN
Qty: 30 TABLET | Refills: 0 | Status: SHIPPED | OUTPATIENT
Start: 2020-07-21 | End: 2020-08-19 | Stop reason: SDUPTHER

## 2020-07-21 RX ORDER — PANTOPRAZOLE SODIUM 40 MG/1
40 TABLET, DELAYED RELEASE ORAL DAILY
Qty: 90 TABLET | Refills: 0 | Status: SHIPPED | OUTPATIENT
Start: 2020-07-21 | End: 2020-10-20

## 2020-07-21 NOTE — PROGRESS NOTES
Established Patient - Audio Only Telehealth Visit     The patient location is: [place]   The chief complaint leading to consultation is: medication refill  Visit type: Virtual visit with audio only (telephone)  Total time spent with patient: 20 minutes      The reason for the audio only service rather than synchronous audio and video virtual visit was related to technical difficulties or patient preference/necessity.     Each patient to whom I provide medical services by telemedicine is:  (1) informed of the relationship between the physician and patient and the respective role of any other health care provider with respect to management of the patient; and (2) notified that they may decline to receive medical services by telemedicine and may withdraw from such care at any time. Patient verbally consented to receive this service via voice-only telephone call.     HPI: patient with recent ED visit on 7/13 for nausea/vomiting/diarrhea (and previously on 7/10/20)  - states still has a few antibiotic tablets left. Currently some symptoms of acid reflux - most notable at night but does     Assessment and plan:  ED follow-up discussed    GERD/dyspepsia - has improved on PPI therapy in the past - will re-initiate pantoprazole 40 mg daily     Diarrhea - utilizing anti-diarrheal therapy as noted - refill provided    Metastatic carcinoid - continue follow-up with Neuroendocrine Surgery     UTI - completing antibiotic therapy - discussed     Nephrolithiasis - large staghorn calculus noted on 7/13/20 CT renal stone. Discussed follow-up with Urology - referral. Does have transportation issues to navigate with respect to getting to appointments     This service was not originating from a related E/M service provided within the previous 7 days nor will  to an E/M service or procedure within the next 24 hours or my soonest available appointment.  Prevailing standard of care was able to be met in this audio-only visit.       Pantera Rosen MD  Internal Medicine-Pediatrics

## 2020-07-31 ENCOUNTER — EXTERNAL CHRONIC CARE MANAGEMENT (OUTPATIENT)
Dept: PRIMARY CARE CLINIC | Facility: CLINIC | Age: 70
End: 2020-07-31
Payer: MEDICARE

## 2020-07-31 PROCEDURE — 99490 PR CHRONIC CARE MGMT, 1ST 20 MIN: ICD-10-PCS | Mod: S$GLB,,, | Performed by: INTERNAL MEDICINE

## 2020-07-31 PROCEDURE — 99490 CHRNC CARE MGMT STAFF 1ST 20: CPT | Mod: S$GLB,,, | Performed by: INTERNAL MEDICINE

## 2020-08-19 DIAGNOSIS — K52.9 CHRONIC DIARRHEA: ICD-10-CM

## 2020-08-19 RX ORDER — FLUCONAZOLE 150 MG/1
150 TABLET ORAL DAILY
Qty: 3 TABLET | Refills: 0 | Status: SHIPPED | OUTPATIENT
Start: 2020-08-19 | End: 2020-08-20

## 2020-08-19 RX ORDER — DIPHENOXYLATE HYDROCHLORIDE AND ATROPINE SULFATE 2.5; .025 MG/1; MG/1
1 TABLET ORAL 4 TIMES DAILY PRN
Qty: 30 TABLET | Refills: 0 | Status: SHIPPED | OUTPATIENT
Start: 2020-08-19 | End: 2020-10-01 | Stop reason: SDUPTHER

## 2020-08-19 NOTE — TELEPHONE ENCOUNTER
----- Message from Ernestine Torres sent at 8/19/2020  2:53 PM CDT -----  Type: RX Refill Request    Who Called:  Self     Have you contacted your pharmacy: no     Refill or New Rx: Refill     RX Name and Strength:    diphenoxylate-atropine 2.5-0.025 mg (LOMOTIL) 2.5-0.025 mg per tablet  fluconazole tablet 150 mg     Preferred Pharmacy with phone number:[facility] #[id number] - [place] [address] AT [facility] [address] [facility] [address] [phone] (Phone)  [phone] (Fax)        Local or Mail Order: local     Ordering Provider:Dr. Rosen    Would the patient rather a call back or a [facility]?  Call     Best Call Back Number:[phone] (home)

## 2020-08-20 ENCOUNTER — PATIENT OUTREACH (OUTPATIENT)
Dept: ADMINISTRATIVE | Facility: HOSPITAL | Age: 70
End: 2020-08-20

## 2020-08-31 ENCOUNTER — EXTERNAL CHRONIC CARE MANAGEMENT (OUTPATIENT)
Dept: PRIMARY CARE CLINIC | Facility: CLINIC | Age: 70
End: 2020-08-31
Payer: MEDICARE

## 2020-08-31 PROCEDURE — 99490 PR CHRONIC CARE MGMT, 1ST 20 MIN: ICD-10-PCS | Mod: S$GLB,,, | Performed by: INTERNAL MEDICINE

## 2020-08-31 PROCEDURE — 99490 CHRNC CARE MGMT STAFF 1ST 20: CPT | Mod: S$GLB,,, | Performed by: INTERNAL MEDICINE

## 2020-09-02 ENCOUNTER — TELEPHONE (OUTPATIENT)
Dept: FAMILY MEDICINE | Facility: CLINIC | Age: 70
End: 2020-09-02

## 2020-09-02 NOTE — TELEPHONE ENCOUNTER
Called patient to confirmed appt for tomorrow. She stated it was suppose to be an audio visit. Please call patient back about appointment

## 2020-09-04 ENCOUNTER — PATIENT OUTREACH (OUTPATIENT)
Dept: ADMINISTRATIVE | Facility: HOSPITAL | Age: 70
End: 2020-09-04

## 2020-09-04 DIAGNOSIS — Z12.39 BREAST CANCER SCREENING: Primary | ICD-10-CM

## 2020-09-04 DIAGNOSIS — Z12.31 ENCOUNTER FOR SCREENING MAMMOGRAM FOR MALIGNANT NEOPLASM OF BREAST: ICD-10-CM

## 2020-09-10 ENCOUNTER — TELEPHONE (OUTPATIENT)
Dept: FAMILY MEDICINE | Facility: CLINIC | Age: 70
End: 2020-09-10

## 2020-09-10 NOTE — TELEPHONE ENCOUNTER
----- Message from Ti Benjamin sent at 9/10/2020  9:43 AM CDT -----  Regarding: self  Type: Patient Call Back    Who called: self    What is the request in detail: patient states she has a lot of vaginal pressure and urinary frequency. She would like something called in to [facility] #[id number] - [facility] [address] [facility] [address] [facility] [address] [phone] (Phone)  [phone] (Fax)        Can the clinic reply by [facility]? No     Would the patient rather a call back or a response via [facility]? call    Best call back number:[phone]

## 2020-09-10 NOTE — TELEPHONE ENCOUNTER
Patient states for the past few days she has been having vaginal pressure and polyuria.     She would like for something to be called into the pharmacy.    Please address the patient concerns.    Thanks,  Mine

## 2020-09-10 NOTE — TELEPHONE ENCOUNTER
Patient has had UTIs most recently June and July 2020 -- these have been resistant to several antibiotics so she should come in for evaluation to have a urine specimen collected in order to determine the best medications

## 2020-09-11 NOTE — TELEPHONE ENCOUNTER
Patient states that she has a lot of pressure and burining sensation.     Patient would like to know if orders can be placed for test so she can drop off a sample at the [facility] close to her home. States that she can not wait until 09/18/2020.

## 2020-09-11 NOTE — TELEPHONE ENCOUNTER
Can she do an urgent care visit at a nearby provider? I am pushing this since I will be unavailable most of next week and it will be the fastest thing for her to get evaluated and treated without waiting

## 2020-09-30 ENCOUNTER — EXTERNAL CHRONIC CARE MANAGEMENT (OUTPATIENT)
Dept: PRIMARY CARE CLINIC | Facility: CLINIC | Age: 70
End: 2020-09-30
Payer: MEDICARE

## 2020-09-30 PROCEDURE — 99490 CHRNC CARE MGMT STAFF 1ST 20: CPT | Mod: S$GLB,,, | Performed by: INTERNAL MEDICINE

## 2020-09-30 PROCEDURE — 99490 PR CHRONIC CARE MGMT, 1ST 20 MIN: ICD-10-PCS | Mod: S$GLB,,, | Performed by: INTERNAL MEDICINE

## 2020-10-01 ENCOUNTER — TELEPHONE (OUTPATIENT)
Dept: FAMILY MEDICINE | Facility: CLINIC | Age: 70
End: 2020-10-01

## 2020-10-01 DIAGNOSIS — K52.9 CHRONIC DIARRHEA: ICD-10-CM

## 2020-10-01 DIAGNOSIS — N39.0 RECURRENT UTI (URINARY TRACT INFECTION): Primary | ICD-10-CM

## 2020-10-01 RX ORDER — DIPHENOXYLATE HYDROCHLORIDE AND ATROPINE SULFATE 2.5; .025 MG/1; MG/1
1 TABLET ORAL 4 TIMES DAILY PRN
Qty: 30 TABLET | Refills: 0 | Status: ON HOLD | OUTPATIENT
Start: 2020-10-01 | End: 2021-03-15

## 2020-10-01 NOTE — TELEPHONE ENCOUNTER
----- Message from Troyviviane Benjamin sent at 10/1/2020 12:12 PM CDT -----  Regarding: self  Type: RX Refill Request    Who Called:  self    Have you contacted your pharmacy: no     Refill or New Rx: refill     RX Name and Strength: diphenoxylate-atropine 2.5-0.025 mg (LOMOTIL) 2.5-0.025 mg per tablet    Preferred Pharmacy with phone number:   [facility] #[id number] [place] [address] AT [facility] OF [address] [phone] (Phone)  [phone] (Fax)      Local or Mail Order: local     Ordering Provider: Silas    Would the patient rather a call back or a response via [facility]? call    Best Call Back Number: [phone]

## 2020-10-01 NOTE — TELEPHONE ENCOUNTER
----- Message from Ti Benjamin sent at 10/1/2020 12:07 PM CDT -----  Regarding: self  Type: Patient Call Back    Who called: self  What is the request in detail: pt went to urgent care on 09/25/2020 please review her note     Can the clinic reply by [facility]? no    Would the patient rather a call back or a response via [facility]? Call     Best call back number:[phone]

## 2020-10-01 NOTE — TELEPHONE ENCOUNTER
Reviewed symptoms - patient with long history of recurrent urinary tract infections, bladder problems. We cannot evaluate this condition via telephone - she was referred to Urology in July 2020 but this was never scheduled. I am going to place an urgent referral to Urology now for her recurrent UTI/bladder symptoms. Patient may call referrals coordinator to get this scheduled/check availability ([phone])    Received refill request for anti-diarrheal - Sent medication refill(s) to patient's preferred pharmacy on file.    Reviewed request for home health orders - do we know home health agency? (is this [facility] or [facility]?) since I need to put in electronically vs otherwise

## 2020-10-01 NOTE — TELEPHONE ENCOUNTER
Cindy from the medical team call to ask for orders for patient. Patient had a stroke a few months back and they are just getting a chance to visit her. After elevation patient with need assist with ADL's. The medical team is requesting orders for Home health, skill nursing, PT and a Aide to assist patient. Please address

## 2020-10-01 NOTE — TELEPHONE ENCOUNTER
----- Message from Albertina Londono sent at 10/1/2020 11:46 AM CDT -----  Regarding: nicole with medical team [phone]  Type: Patient Call Back    Who called:nicole with medical team [phone]    What is the request in detail:following up on physical therapy for patient. Call nicole in regards to order.    Can the clinic reply by [facility]?    Would the patient rather a call back or a response via [facility]? call    Best call back number:nicole with medical team [phone]    Additional Information:

## 2020-10-01 NOTE — TELEPHONE ENCOUNTER
Patient states she went to [facility] as instructed by pcp and she was told she has a UTI and meds were given  And it did not help , states  She still has an urgency to go and pressure. Please advise

## 2020-10-07 ENCOUNTER — TELEPHONE (OUTPATIENT)
Dept: FAMILY MEDICINE | Facility: CLINIC | Age: 70
End: 2020-10-07

## 2020-10-07 NOTE — TELEPHONE ENCOUNTER
----- Message from Ernestine Torres sent at 10/7/2020 10:26 AM CDT -----  Type: Patient Call Back    Who called: Michelle - Medical Team     What is the request in detail: need an Home health order for Nurse. Possible issues with medications and need assistance     Can the clinic reply by [facility]? No     Would the patient rather a call back or a response via [facility]?  Call     Best call back number: [phone] Fax:  [phone]
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
## 2020-10-12 NOTE — PROGRESS NOTES
SUMMARY  1st Attempt to complete SW follow-up for Outpatient Care Management; left message requesting return call.  LCSW will reattempt at a later date.      INTERVENTIONS  None    PLAN  Follow up next week.    Statement Selected

## 2020-10-15 ENCOUNTER — TELEPHONE (OUTPATIENT)
Dept: FAMILY MEDICINE | Facility: CLINIC | Age: 70
End: 2020-10-15

## 2020-10-15 ENCOUNTER — OFFICE VISIT (OUTPATIENT)
Dept: FAMILY MEDICINE | Facility: CLINIC | Age: 70
End: 2020-10-15
Payer: MEDICARE

## 2020-10-15 VITALS
DIASTOLIC BLOOD PRESSURE: 82 MMHG | HEIGHT: 66 IN | BODY MASS INDEX: 29.69 KG/M2 | SYSTOLIC BLOOD PRESSURE: 128 MMHG | TEMPERATURE: 98 F | HEART RATE: 58 BPM | OXYGEN SATURATION: 96 % | WEIGHT: 184.75 LBS

## 2020-10-15 DIAGNOSIS — N20.0 STAGHORN CALCULUS: ICD-10-CM

## 2020-10-15 DIAGNOSIS — R35.0 INCREASED FREQUENCY OF URINATION: Primary | ICD-10-CM

## 2020-10-15 PROCEDURE — 1159F PR MEDICATION LIST DOCUMENTED IN MEDICAL RECORD: ICD-10-PCS | Mod: HCNC,S$GLB,, | Performed by: INTERNAL MEDICINE

## 2020-10-15 PROCEDURE — 3074F SYST BP LT 130 MM HG: CPT | Mod: HCNC,CPTII,S$GLB, | Performed by: INTERNAL MEDICINE

## 2020-10-15 PROCEDURE — 1125F AMNT PAIN NOTED PAIN PRSNT: CPT | Mod: HCNC,S$GLB,, | Performed by: INTERNAL MEDICINE

## 2020-10-15 PROCEDURE — 3074F PR MOST RECENT SYSTOLIC BLOOD PRESSURE < 130 MM HG: ICD-10-PCS | Mod: HCNC,CPTII,S$GLB, | Performed by: INTERNAL MEDICINE

## 2020-10-15 PROCEDURE — 3079F DIAST BP 80-89 MM HG: CPT | Mod: HCNC,CPTII,S$GLB, | Performed by: INTERNAL MEDICINE

## 2020-10-15 PROCEDURE — 1101F PT FALLS ASSESS-DOCD LE1/YR: CPT | Mod: HCNC,CPTII,S$GLB, | Performed by: INTERNAL MEDICINE

## 2020-10-15 PROCEDURE — 1125F PR PAIN SEVERITY QUANTIFIED, PAIN PRESENT: ICD-10-PCS | Mod: HCNC,S$GLB,, | Performed by: INTERNAL MEDICINE

## 2020-10-15 PROCEDURE — 1101F PR PT FALLS ASSESS DOC 0-1 FALLS W/OUT INJ PAST YR: ICD-10-PCS | Mod: HCNC,CPTII,S$GLB, | Performed by: INTERNAL MEDICINE

## 2020-10-15 PROCEDURE — 3008F BODY MASS INDEX DOCD: CPT | Mod: HCNC,CPTII,S$GLB, | Performed by: INTERNAL MEDICINE

## 2020-10-15 PROCEDURE — 99999 PR PBB SHADOW E&M-EST. PATIENT-LVL III: ICD-10-PCS | Mod: PBBFAC,HCNC,, | Performed by: INTERNAL MEDICINE

## 2020-10-15 PROCEDURE — 3008F PR BODY MASS INDEX (BMI) DOCUMENTED: ICD-10-PCS | Mod: HCNC,CPTII,S$GLB, | Performed by: INTERNAL MEDICINE

## 2020-10-15 PROCEDURE — 1159F MED LIST DOCD IN RCRD: CPT | Mod: HCNC,S$GLB,, | Performed by: INTERNAL MEDICINE

## 2020-10-15 PROCEDURE — 99214 OFFICE O/P EST MOD 30 MIN: CPT | Mod: HCNC,S$GLB,, | Performed by: INTERNAL MEDICINE

## 2020-10-15 PROCEDURE — 99999 PR PBB SHADOW E&M-EST. PATIENT-LVL III: CPT | Mod: PBBFAC,HCNC,, | Performed by: INTERNAL MEDICINE

## 2020-10-15 PROCEDURE — 99214 PR OFFICE/OUTPT VISIT, EST, LEVL IV, 30-39 MIN: ICD-10-PCS | Mod: HCNC,S$GLB,, | Performed by: INTERNAL MEDICINE

## 2020-10-15 PROCEDURE — 3079F PR MOST RECENT DIASTOLIC BLOOD PRESSURE 80-89 MM HG: ICD-10-PCS | Mod: HCNC,CPTII,S$GLB, | Performed by: INTERNAL MEDICINE

## 2020-10-15 NOTE — PROGRESS NOTES
[facility] Note    Chief Complaint      Chief Complaint   Patient presents with    Establish Care     pt here to est care with provider closer to home    Urinary Tract Infection     pt states she has a UTI, and has weak legs       History of Present Illness      Patito Domingo is a 70 y.o. female who presents today for   Chief Complaint   Patient presents with    Establish Care     pt here to est care with provider closer to home    Urinary Tract Infection     pt states she has a UTI, and has weak legs   .  Patient comes to appointment here for acute visit related to above . She had visit at urgent care one week ago for uti . She states it was better but she is now having issues again with frequency . She denies hematuria , denies burning . Denies cva tenderness . 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     colon    Pyelonephritis, acute     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)        Past Surgical History:  Past Surgical History:   Procedure Laterality Date    CATARACT EXTRACTION Left 10/2017     SECTION      CHOLECYSTECTOMY      COLON SURGERY      cystoscope      CYSTOSCOPY W/ RETROGRADES Right 10/10/2019    Procedure: CYSTOSCOPY, WITH RETROGRADE PYELOGRAM;  Surgeon: Gen Isbell MD;  Location: [facility];  Service: Urology;  Laterality: Right;    EYE  SURGERY      HYSTERECTOMY  5/1996    LITHOTRIPSY      LIVER BIOPSY  9/14    carcinoid    URETEROSCOPY Right 10/10/2019    Procedure: URETEROSCOPY;  Surgeon: Gen Isbell MD;  Location: [facility];  Service: Urology;  Laterality: Right;    UTERINE FIBROID SURGERY         Family History:  family history includes Alzheimer's disease in her father; Cancer in her mother; No Known Problems in her son, son, son, and son; Stroke in her sister.    Social History:  Social History     Socioeconomic History    Marital status:      Spouse name: Not on file    Number of children: Not on file    Years of education: Not on file    Highest education level: Not on file   Occupational History    Occupation: disabled    Social Needs    Financial resource strain: Somewhat hard    Food insecurity     Worry: Never true     Inability: Never true    Transportation needs     Medical: Yes     Non-medical: Yes   Tobacco Use    Smoking status: Never Smoker    Smokeless tobacco: Never Used   Substance and Sexual Activity    Alcohol use: No     Alcohol/week: 0.0 standard drinks     Frequency: Never     Binge frequency: Never    Drug use: No    Sexual activity: Not Currently   Lifestyle    Physical activity     Days per week: 2 days     Minutes per session: 10 min    Stress: Very much   Relationships    Social connections     Talks on phone: Not on file     Gets together: Not on file     Attends [employer] service: Not on file     Active member of club or organization: Not on file     Attends meetings of clubs or organizations: Not on file     Relationship status:    Other Topics Concern    Not on file   Social History Narrative    Pt lives with son       Review of Systems:   Review of Systems   Constitutional: Negative for chills and fever.   HENT: Negative for hearing loss.    Eyes: Negative for blurred vision.   Respiratory: Negative for cough.    Cardiovascular: Negative for chest pain.    Gastrointestinal: Negative for heartburn and nausea.   Genitourinary: Positive for frequency.   Neurological: Negative for dizziness.   Psychiatric/Behavioral: Negative for depression. The patient is not nervous/anxious.          Medications:  Outpatient Encounter Medications as of 10/15/2020   Medication Sig Dispense Refill    bumetanide (BUMEX) 0.5 MG Tab Take 1 tablet (0.5 mg total) by mouth daily as needed. 30 tablet 0    cholestyramine (QUESTRAN) 4 gram packet       ciprofloxacin HCl (CIPRO) 500 MG tablet Take 1 tablet (500 mg total) by mouth 2 (two) times a day. 14 tablet 0    clopidogreL (PLAVIX) 75 mg tablet Take 1 tablet (75 mg total) by mouth once daily. 90 tablet 1    colestipoL (COLESTID) 1 gram Tab       CREON 24,000-76,000 -120,000 unit capsule       cyanocobalamin (VITAMIN B-12) 1000 MCG tablet Take 1 tablet (1,000 mcg total) by mouth once daily. 30 tablet 5    diclofenac sodium (VOLTAREN) 1 % Gel Apply the gel (2 g) to the affected area 4 times daily. Do not apply more than 8 g daily to any one affected joint of the upper extremities. 100 g 1    diphenoxylate-atropine 2.5-0.025 mg (LOMOTIL) 2.5-0.025 mg per tablet Take 1 tablet by mouth 4 (four) times daily as needed for Diarrhea (for episodic diarrhea). 30 tablet 0    ferrous sulfate (FEOSOL) 325 mg (65 mg iron) Tab tablet TAKE ONE TABLET BY MOUTH THREE TIMES PER WEEK 12 tablet 2    hydrocortisone valerate (WEST-KAREN) 0.2 % ointment Apply topically 1 gram 2 (two) times daily. for 7 days      losartan (COZAAR) 100 MG tablet Take 1 tablet (100 mg total) by mouth once daily. 90 tablet 1    metoprolol tartrate (LOPRESSOR) 50 MG tablet Take 1 tablet (50 mg total) by mouth 2 (two) times daily. 180 tablet 1    oxyCODONE (ROXICODONE) 15 MG Tab TK 1 T PO  Q 4 H prn  0    pantoprazole (PROTONIX) 40 MG tablet Take 1 tablet (40 mg total) by mouth once daily. 90 tablet 0    promethazine (PHENERGAN) 12.5 MG Tab Take 1 tablet (12.5 mg total) by  mouth every 8 (eight) hours as needed (nausea). 6 tablet 0    psyllium (METAMUCIL SUGAR FREE) Powd Take 1 packet by mouth 3 (three) times daily. 90 each 2    SUPREP BOWEL PREP KIT 17.5-3.13-1.6 gram SolR       trolamine salicylate (ASPERCREME) 10 % cream Apply topically as needed.      acetaminophen (TYLENOL) 500 MG tablet Take 1 tablet (500 mg total) by mouth every 6 (six) hours as needed for Pain. (Patient not taking: Reported on 10/15/2020) 60 tablet 1    atorvastatin (LIPITOR) 80 MG tablet Take 1 tablet (80 mg total) by mouth every evening. 90 tablet 3    cloNIDine (CATAPRES) 0.1 MG tablet TAKE ONE TABLET BY MOUTH THREE TIMES DAILY AS NEEDED FOR signs of withdrawal  1    dicyclomine (BENTYL) 20 mg tablet Take 20 mg by mouth.      fluticasone propionate (FLONASE) 50 mcg/actuation nasal spray Use 1 spray in each nostril 1-2 times daily as needed (Patient not taking: Reported on 10/15/2020) 16 g 0    levocetirizine (XYZAL) 5 MG tablet Take 1 tablet (5 mg total) by mouth every evening. 30 tablet 0    ondansetron (ZOFRAN-ODT) 4 MG TbDL Take 1 tablet (4 mg total) by mouth every 8 (eight) hours as needed. 10 tablet 0    tamsulosin (FLOMAX) 0.4 mg Cap Take 1 capsule (0.4 mg total) by mouth every evening. (Patient not taking: Reported on 10/15/2020) 30 capsule 0    traMADol (ULTRAM) 50 mg tablet       traZODone (DESYREL) 50 MG tablet Take 1 tablet (50 mg total) by mouth nightly as needed for Insomnia (May take 1-2 tabs nightly as needed for insomnia). (Patient not taking: Reported on 10/15/2020) 30 tablet 1     No facility-administered encounter medications on file as of 10/15/2020.         Allergies:  Review of patient's allergies indicates:   Allergen Reactions    Epinephrine Anaphylaxis     Can cause  a Carcinoid Crisis    Contrast media Hives, Itching and Swelling    Ibuprofen Hives, Itching and Swelling    Iodinated contrast media     Sulfa (sulfonamide antibiotics) Hives, Itching and Swelling          Physical Exam      Vitals:    10/15/20 0948   BP: 128/82   Pulse: (!) 58   Temp: 97.9 °F (36.6 °C)      Body mass index is 29.82 kg/m².    Physical Exam  Constitutional:       Appearance: She is well-developed.   Eyes:      Pupils: Pupils are equal, round, and reactive to light.   Neck:      Musculoskeletal: Normal range of motion.      Thyroid: No thyromegaly.   Cardiovascular:      Rate and Rhythm: Normal rate.      Heart sounds: Normal heart sounds. No murmur. No friction rub. No gallop.    Pulmonary:      Breath sounds: Normal breath sounds.   Abdominal:      General: Bowel sounds are normal.      Palpations: Abdomen is soft.   Musculoskeletal: Normal range of motion.   Lymphadenopathy:      Cervical: No cervical adenopathy.   Skin:     General: Skin is warm.      Findings: No rash.   Neurological:      Mental Status: She is alert and oriented to person, place, and time.      Cranial Nerves: No cranial nerve deficit.      Motor: Weakness present.   Psychiatric:         Behavior: Behavior normal.          Laboratory:  CBC:  No results for input(s): WBC, RBC, HGB, HCT, PLT, MCV, MCH, MCHC in the last 2160 hours.  CMP:  No results for input(s): GLU, CALCIUM, ALBUMIN, PROT, NA, K, CO2, CL, BUN, ALKPHOS, ALT, AST, BILITOT in the last 2160 hours.    Invalid input(s): CREATININ  URINALYSIS:  No results for input(s): COLORU, CLARITYU, SPECGRAV, PHUR, PROTEINUA, GLUCOSEU, BILIRUBINCON, BLOODU, WBCU, RBCU, BACTERIA, MUCUS, NITRITE, LEUKOCYTESUR, UROBILINOGEN, HYALINECASTS in the last 2160 hours.   LIPIDS:  No results for input(s): TSH, HDL, CHOL, TRIG, LDLCALC, CHOLHDL, NONHDLCHOL, TOTALCHOLEST in the last 2160 hours.  TSH:  No results for input(s): TSH in the last 2160 hours.  A1C:  No results for input(s): HGBA1C in the last 2160 hours.    Radiology:        Assessment:     Patito Domingo is a 70 y.o.female with:    Increased frequency of urination  -     X-Ray Abdomen AP 1 View; Future; Expected date:  10/15/2020  -     Urinalysis, Reflex to Urine Culture Urine, Clean Catch; Future; Expected date: 10/15/2020    Staghorn calculus  -     X-Ray Abdomen AP 1 View; Future; Expected date: 10/15/2020          Plan:     Problem List Items Addressed This Visit        Renal/    Increased frequency of urination - Primary    Overview     Needs repeat ua with cx if indicated , no empiric therapy yet as she just completed course of cipro          Staghorn calculus    Overview     kub pending                As above, continue current medications and maintain follow up with specialists.  Return to clinic in 2 weeks       [facility]er[facility] W Dantagnan [facility] [place]

## 2020-10-15 NOTE — TELEPHONE ENCOUNTER
----- Message from Pasha Delgado MD sent at 10/15/2020 12:48 PM CDT -----  Notify patient that the renal stones are all stable no new stones or stones that are moving . Still waiting on results of the urinalysis

## 2020-10-22 ENCOUNTER — PATIENT OUTREACH (OUTPATIENT)
Dept: ADMINISTRATIVE | Facility: HOSPITAL | Age: 70
End: 2020-10-22

## 2020-10-22 DIAGNOSIS — Z78.0 MENOPAUSE: Primary | ICD-10-CM

## 2020-10-31 ENCOUNTER — EXTERNAL CHRONIC CARE MANAGEMENT (OUTPATIENT)
Dept: PRIMARY CARE CLINIC | Facility: CLINIC | Age: 70
End: 2020-10-31
Payer: MEDICARE

## 2020-10-31 PROCEDURE — 99490 CHRNC CARE MGMT STAFF 1ST 20: CPT | Mod: S$GLB,,, | Performed by: INTERNAL MEDICINE

## 2020-10-31 PROCEDURE — 99490 PR CHRONIC CARE MGMT, 1ST 20 MIN: ICD-10-PCS | Mod: S$GLB,,, | Performed by: INTERNAL MEDICINE

## 2020-11-05 ENCOUNTER — OFFICE VISIT (OUTPATIENT)
Dept: FAMILY MEDICINE | Facility: CLINIC | Age: 70
End: 2020-11-05
Payer: MEDICARE

## 2020-11-05 VITALS
SYSTOLIC BLOOD PRESSURE: 118 MMHG | HEART RATE: 60 BPM | DIASTOLIC BLOOD PRESSURE: 80 MMHG | RESPIRATION RATE: 18 BRPM | TEMPERATURE: 97 F | OXYGEN SATURATION: 98 % | BODY MASS INDEX: 29.14 KG/M2 | HEIGHT: 66 IN | WEIGHT: 181.31 LBS

## 2020-11-05 DIAGNOSIS — I10 ESSENTIAL HYPERTENSION: Chronic | ICD-10-CM

## 2020-11-05 DIAGNOSIS — D63.8 ANEMIA OF CHRONIC DISEASE: Chronic | ICD-10-CM

## 2020-11-05 DIAGNOSIS — J30.9 ACUTE ALLERGIC RHINITIS: ICD-10-CM

## 2020-11-05 DIAGNOSIS — N20.0 STAGHORN CALCULUS: ICD-10-CM

## 2020-11-05 DIAGNOSIS — C7A.8 NEUROENDOCRINE CARCINOMA, UNKNOWN PRIMARY SITE: ICD-10-CM

## 2020-11-05 DIAGNOSIS — E34.0 CARCINOID SYNDROME: ICD-10-CM

## 2020-11-05 PROCEDURE — 3079F PR MOST RECENT DIASTOLIC BLOOD PRESSURE 80-89 MM HG: ICD-10-PCS | Mod: HCNC,CPTII,S$GLB, | Performed by: INTERNAL MEDICINE

## 2020-11-05 PROCEDURE — 1101F PR PT FALLS ASSESS DOC 0-1 FALLS W/OUT INJ PAST YR: ICD-10-PCS | Mod: HCNC,CPTII,S$GLB, | Performed by: INTERNAL MEDICINE

## 2020-11-05 PROCEDURE — 99214 OFFICE O/P EST MOD 30 MIN: CPT | Mod: HCNC,S$GLB,, | Performed by: INTERNAL MEDICINE

## 2020-11-05 PROCEDURE — 3008F PR BODY MASS INDEX (BMI) DOCUMENTED: ICD-10-PCS | Mod: HCNC,CPTII,S$GLB, | Performed by: INTERNAL MEDICINE

## 2020-11-05 PROCEDURE — 1126F PR PAIN SEVERITY QUANTIFIED, NO PAIN PRESENT: ICD-10-PCS | Mod: HCNC,S$GLB,, | Performed by: INTERNAL MEDICINE

## 2020-11-05 PROCEDURE — 99999 PR PBB SHADOW E&M-EST. PATIENT-LVL III: ICD-10-PCS | Mod: PBBFAC,HCNC,, | Performed by: INTERNAL MEDICINE

## 2020-11-05 PROCEDURE — 3008F BODY MASS INDEX DOCD: CPT | Mod: HCNC,CPTII,S$GLB, | Performed by: INTERNAL MEDICINE

## 2020-11-05 PROCEDURE — 1159F MED LIST DOCD IN RCRD: CPT | Mod: HCNC,S$GLB,, | Performed by: INTERNAL MEDICINE

## 2020-11-05 PROCEDURE — 99999 PR PBB SHADOW E&M-EST. PATIENT-LVL III: CPT | Mod: PBBFAC,HCNC,, | Performed by: INTERNAL MEDICINE

## 2020-11-05 PROCEDURE — 1101F PT FALLS ASSESS-DOCD LE1/YR: CPT | Mod: HCNC,CPTII,S$GLB, | Performed by: INTERNAL MEDICINE

## 2020-11-05 PROCEDURE — 3079F DIAST BP 80-89 MM HG: CPT | Mod: HCNC,CPTII,S$GLB, | Performed by: INTERNAL MEDICINE

## 2020-11-05 PROCEDURE — 1159F PR MEDICATION LIST DOCUMENTED IN MEDICAL RECORD: ICD-10-PCS | Mod: HCNC,S$GLB,, | Performed by: INTERNAL MEDICINE

## 2020-11-05 PROCEDURE — 99499 UNLISTED E&M SERVICE: CPT | Mod: S$GLB,,, | Performed by: INTERNAL MEDICINE

## 2020-11-05 PROCEDURE — 3074F PR MOST RECENT SYSTOLIC BLOOD PRESSURE < 130 MM HG: ICD-10-PCS | Mod: HCNC,CPTII,S$GLB, | Performed by: INTERNAL MEDICINE

## 2020-11-05 PROCEDURE — 3074F SYST BP LT 130 MM HG: CPT | Mod: HCNC,CPTII,S$GLB, | Performed by: INTERNAL MEDICINE

## 2020-11-05 PROCEDURE — 99499 RISK ADDL DX/OHS AUDIT: ICD-10-PCS | Mod: S$GLB,,, | Performed by: INTERNAL MEDICINE

## 2020-11-05 PROCEDURE — 99214 PR OFFICE/OUTPT VISIT, EST, LEVL IV, 30-39 MIN: ICD-10-PCS | Mod: HCNC,S$GLB,, | Performed by: INTERNAL MEDICINE

## 2020-11-05 PROCEDURE — 1126F AMNT PAIN NOTED NONE PRSNT: CPT | Mod: HCNC,S$GLB,, | Performed by: INTERNAL MEDICINE

## 2020-11-05 RX ORDER — LEVOCETIRIZINE DIHYDROCHLORIDE 5 MG/1
5 TABLET, FILM COATED ORAL NIGHTLY
Qty: 30 TABLET | Refills: 1 | Status: SHIPPED | OUTPATIENT
Start: 2020-11-05 | End: 2020-11-05

## 2020-11-05 RX ORDER — LEVOCETIRIZINE DIHYDROCHLORIDE 5 MG/1
TABLET, FILM COATED ORAL
Qty: 90 TABLET | Refills: 0 | Status: SHIPPED | OUTPATIENT
Start: 2020-11-05 | End: 2021-02-02

## 2020-11-05 NOTE — PROGRESS NOTES
[facility] Note    Chief Complaint      Chief Complaint   Patient presents with    Follow-up       History of Present Illness      Patito Domingo is a 70 y.o. female who presents today for   Chief Complaint   Patient presents with    Follow-up   .  Patient comes to appointment here for routine f/u for chronic issues . She will be seeing dr kelli jordan for her renal stone history . She is currently in no pain . She is seeing both heme /onc as well as neuroendocrine specialists for her neuroendocrine carcinoma. She is stable with her pain management  reviewed     Problem List Items Addressed This Visit        Cardiac/Vascular    Essential hypertension (Chronic)    Overview     bp well controlled on current             Renal/    Staghorn calculus    Overview     Will be seeing dr pereira soon . 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     colon    Pyelonephritis, acute     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)        Past Surgical History:  Past Surgical History:   Procedure Laterality Date    CATARACT EXTRACTION Left 10/2017     SECTION      CHOLECYSTECTOMY      COLON SURGERY      cystoscope      CYSTOSCOPY W/ RETROGRADES Right 10/10/2019    Procedure: CYSTOSCOPY, WITH RETROGRADE PYELOGRAM;  Surgeon: Gen Isbell MD;  Location: [facility];  Service: Urology;  Laterality: Right;    EYE SURGERY      HYSTERECTOMY  1996     LITHOTRIPSY      LIVER BIOPSY  9/14    carcinoid    URETEROSCOPY Right 10/10/2019    Procedure: URETEROSCOPY;  Surgeon: Gen Isbell MD;  Location: [facility];  Service: Urology;  Laterality: Right;    UTERINE FIBROID SURGERY         Family History:  family history includes Alzheimer's disease in her father; Cancer in her mother; No Known Problems in her son, son, son, and son; Stroke in her sister.    Social History:  Social History     Socioeconomic History    Marital status:      Spouse name: Not on file    Number of children: Not on file    Years of education: Not on file    Highest education level: Not on file   Occupational History    Occupation: disabled    Social Needs    Financial resource strain: Somewhat hard    Food insecurity     Worry: Never true     Inability: Never true    Transportation needs     Medical: Yes     Non-medical: Yes   Tobacco Use    Smoking status: Never Smoker    Smokeless tobacco: Never Used   Substance and Sexual Activity    Alcohol use: No     Alcohol/week: 0.0 standard drinks     Frequency: Never     Binge frequency: Never    Drug use: No    Sexual activity: Not Currently   Lifestyle    Physical activity     Days per week: 2 days     Minutes per session: 10 min    Stress: Very much   Relationships    Social connections     Talks on phone: Not on file     Gets together: Not on file     Attends [employer] service: Not on file     Active member of club or organization: Not on file     Attends meetings of clubs or organizations: Not on file     Relationship status:    Other Topics Concern    Not on file   Social History Narrative    Pt lives with son       Review of Systems:   Review of Systems   Constitutional: Negative for fever and weight loss.   HENT: Positive for sore throat. Negative for congestion and hearing loss.    Eyes: Negative for blurred vision.   Respiratory: Negative for cough and shortness of breath.     Cardiovascular: Negative for chest pain, palpitations, claudication and leg swelling.   Gastrointestinal: Positive for abdominal pain and diarrhea. Negative for constipation, heartburn, nausea and vomiting.   Genitourinary: Negative for dysuria.   Musculoskeletal: Positive for joint pain. Negative for back pain and myalgias.   Skin: Negative for rash.   Neurological: Negative for focal weakness and headaches.   Psychiatric/Behavioral: Negative for depression and suicidal ideas. The patient is not nervous/anxious.          Medications:  Outpatient Encounter Medications as of 11/5/2020   Medication Sig Dispense Refill    acetaminophen (TYLENOL) 500 MG tablet Take 1 tablet (500 mg total) by mouth every 6 (six) hours as needed for Pain. 60 tablet 1    atorvastatin (LIPITOR) 80 MG tablet Take 1 tablet (80 mg total) by mouth every evening. 90 tablet 3    bumetanide (BUMEX) 0.5 MG Tab Take 1 tablet (0.5 mg total) by mouth daily as needed. 30 tablet 0    cholestyramine (QUESTRAN) 4 gram packet       cloNIDine (CATAPRES) 0.1 MG tablet TAKE ONE TABLET BY MOUTH THREE TIMES DAILY AS NEEDED FOR signs of withdrawal  1    clopidogreL (PLAVIX) 75 mg tablet Take 1 tablet (75 mg total) by mouth once daily. 90 tablet 1    colestipoL (COLESTID) 1 gram Tab       CREON 24,000-76,000 -120,000 unit capsule       cyanocobalamin (VITAMIN B-12) 1000 MCG tablet Take 1 tablet (1,000 mcg total) by mouth once daily. 30 tablet 5    diclofenac sodium (VOLTAREN) 1 % Gel Apply the gel (2 g) to the affected area 4 times daily. Do not apply more than 8 g daily to any one affected joint of the upper extremities. 100 g 1    dicyclomine (BENTYL) 20 mg tablet Take 20 mg by mouth.      diphenoxylate-atropine 2.5-0.025 mg (LOMOTIL) 2.5-0.025 mg per tablet Take 1 tablet by mouth 4 (four) times daily as needed for Diarrhea (for episodic diarrhea). 30 tablet 0    ferrous sulfate (FEOSOL) 325 mg (65 mg iron) Tab tablet TAKE ONE TABLET BY MOUTH  THREE TIMES PER WEEK 12 tablet 2    fluticasone propionate (FLONASE) 50 mcg/actuation nasal spray Use 1 spray in each nostril 1-2 times daily as needed 16 g 0    hydrocortisone valerate (WEST-KAREN) 0.2 % ointment Apply topically 1 gram 2 (two) times daily. for 7 days      levocetirizine (XYZAL) 5 MG tablet Take 1 tablet (5 mg total) by mouth every evening. 30 tablet 1    losartan (COZAAR) 100 MG tablet Take 1 tablet (100 mg total) by mouth once daily. 90 tablet 1    metoprolol tartrate (LOPRESSOR) 50 MG tablet Take 1 tablet (50 mg total) by mouth 2 (two) times daily. 180 tablet 1    ondansetron (ZOFRAN-ODT) 4 MG TbDL Take 1 tablet (4 mg total) by mouth every 8 (eight) hours as needed. 10 tablet 0    oxyCODONE (ROXICODONE) 15 MG Tab TK 1 T PO  Q 4 H prn  0    pantoprazole (PROTONIX) 40 MG tablet TAKE 1 TABLET(40 MG) BY MOUTH EVERY DAY 90 tablet 0    promethazine (PHENERGAN) 12.5 MG Tab Take 1 tablet (12.5 mg total) by mouth every 8 (eight) hours as needed (nausea). 6 tablet 0    psyllium (METAMUCIL SUGAR FREE) Powd Take 1 packet by mouth 3 (three) times daily. 90 each 2    SUPREP BOWEL PREP KIT 17.5-3.13-1.6 gram SolR       tamsulosin (FLOMAX) 0.4 mg Cap Take 1 capsule (0.4 mg total) by mouth every evening. 30 capsule 0    traMADol (ULTRAM) 50 mg tablet       traZODone (DESYREL) 50 MG tablet Take 1 tablet (50 mg total) by mouth nightly as needed for Insomnia (May take 1-2 tabs nightly as needed for insomnia). 30 tablet 1    trolamine salicylate (ASPERCREME) 10 % cream Apply topically as needed.      [DISCONTINUED] levocetirizine (XYZAL) 5 MG tablet Take 1 tablet (5 mg total) by mouth every evening. 30 tablet 0    nitrofurantoin, macrocrystal-monohydrate, (MACROBID) 100 MG capsule Take 1 capsule (100 mg total) by mouth 2 (two) times daily. (Patient not taking: Reported on 11/5/2020) 14 capsule 0     No facility-administered encounter medications on file as of 11/5/2020.         Allergies:  Review of  patient's allergies indicates:   Allergen Reactions    Epinephrine Anaphylaxis     Can cause  a Carcinoid Crisis    Contrast media Hives, Itching and Swelling    Ibuprofen Hives, Itching and Swelling    Iodinated contrast media     Sulfa (sulfonamide antibiotics) Hives, Itching and Swelling         Physical Exam      Vitals:    11/05/20 1048   BP: 118/80   Pulse: 60   Resp: 18   Temp: 97.3 °F (36.3 °C)      Body mass index is 29.27 kg/m².    Physical Exam  Constitutional:       Appearance: She is well-developed.   Eyes:      Pupils: Pupils are equal, round, and reactive to light.   Neck:      Musculoskeletal: Normal range of motion.      Thyroid: No thyromegaly.   Cardiovascular:      Rate and Rhythm: Normal rate.      Heart sounds: Normal heart sounds. No murmur. No friction rub. No gallop.    Pulmonary:      Breath sounds: Normal breath sounds.   Abdominal:      General: Bowel sounds are normal.      Palpations: Abdomen is soft.   Musculoskeletal: Normal range of motion.   Lymphadenopathy:      Cervical: No cervical adenopathy.   Skin:     General: Skin is warm.      Findings: No rash.   Neurological:      Mental Status: She is alert and oriented to person, place, and time.      Cranial Nerves: No cranial nerve deficit.   Psychiatric:         Behavior: Behavior normal.          Laboratory:  CBC:  No results for input(s): WBC, RBC, HGB, HCT, PLT, MCV, MCH, MCHC in the last 2160 hours.  CMP:  No results for input(s): GLU, CALCIUM, ALBUMIN, PROT, NA, K, CO2, CL, BUN, ALKPHOS, ALT, AST, BILITOT in the last 2160 hours.    Invalid input(s): CREATININ  URINALYSIS:  Recent Labs   Lab Result Units 10/15/20  1225   Color, UA  Yellow   Specific Gravity, UA  1.025   pH, UA  6.0   Protein, UA  Negative   Bacteria /hpf Many*   Nitrite, UA  Negative   Leukocytes, UA  1+*   Urobilinogen, UA EU/dL Negative      LIPIDS:  No results for input(s): TSH, HDL, CHOL, TRIG, LDLCALC, CHOLHDL, NONHDLCHOL, TOTALCHOLEST in the last 2160  hours.  TSH:  No results for input(s): TSH in the last 2160 hours.  A1C:  No results for input(s): HGBA1C in the last 2160 hours.    Radiology:        Assessment:     Patito Domingo is a 70 y.o.female with:    Neuroendocrine carcinoma, unknown primary site    Staghorn calculus    Carcinoid syndrome    Anemia of chronic disease    Essential hypertension    Acute allergic rhinitis  -     levocetirizine (XYZAL) 5 MG tablet; Take 1 tablet (5 mg total) by mouth every evening.  Dispense: 30 tablet; Refill: 1          Plan:     Problem List Items Addressed This Visit        Cardiac/Vascular    Essential hypertension (Chronic)    Overview     bp well controlled on current             Renal/    Staghorn calculus    Overview     Will be seeing dr pereira soon . asymtomatic currently            Oncology    Anemia of chronic disease (Chronic)    Overview     Stable          Neuroendocrine carcinoma, unknown primary site    Overview     Cont per specialists             Endocrine    Carcinoid syndrome    Overview     Stable            Other Visit Diagnoses     Acute allergic rhinitis              As above, continue current medications and maintain follow up with specialists.  Return to clinic in 6 months.      [facility]erick W Dantagnan [facility] [place]

## 2020-11-13 ENCOUNTER — TELEPHONE (OUTPATIENT)
Dept: FAMILY MEDICINE | Facility: CLINIC | Age: 70
End: 2020-11-13

## 2020-11-13 RX ORDER — AZITHROMYCIN 250 MG/1
TABLET, FILM COATED ORAL
Qty: 6 TABLET | Refills: 0 | Status: SHIPPED | OUTPATIENT
Start: 2020-11-13 | End: 2020-11-18

## 2020-11-13 NOTE — TELEPHONE ENCOUNTER
Patient states her throat is still sore and irritated and has a cough since the 11/5 visit. Patient is asking if antibiotics could be called into the pharmacy

## 2020-11-13 NOTE — TELEPHONE ENCOUNTER
----- Message from Yeimy Boateng sent at 11/13/2020 11:38 AM CST -----  Type:  Needs Medical Advice    Who Called: pt  Symptoms (please be specific):  wants something else called in for her throat   How long has patient had these symptoms:  since her last visit on the 5th  Pharmacy name and phone #:    Would the patient rather a call back or a response via [facility]?   Best Call Back Number:  [phone]  Additional Information:

## 2020-11-18 DIAGNOSIS — I1A.0 RESISTANT HYPERTENSION: ICD-10-CM

## 2020-11-18 DIAGNOSIS — Z86.73 HISTORY OF CVA (CEREBROVASCULAR ACCIDENT): ICD-10-CM

## 2020-11-18 RX ORDER — CLOPIDOGREL BISULFATE 75 MG/1
75 TABLET ORAL DAILY
Qty: 90 TABLET | Refills: 1 | Status: ON HOLD | OUTPATIENT
Start: 2020-11-18 | End: 2021-03-15

## 2020-11-18 RX ORDER — LOSARTAN POTASSIUM 100 MG/1
100 TABLET ORAL DAILY
Qty: 90 TABLET | Refills: 1 | Status: SHIPPED | OUTPATIENT
Start: 2020-11-18 | End: 2021-02-22 | Stop reason: SDUPTHER

## 2020-11-18 NOTE — TELEPHONE ENCOUNTER
----- Message from Giulia Michelle sent at 11/18/2020 11:22 AM CST -----  Contact: [phone]/self  Type:  RX Refill Request    Who Called: pt  Refill or New Rx:refill  RX Name and Strength:clopidogreL (PLAVIX) 75 mg tablet  How is the patient currently taking it? (ex. 1XDay):1 a day  Is this a 30 day or 90 day RX:90  Preferred [facility] phone number[facility]  Local or Mail Order:Local  Ordering Provider:Dr. Delgado  Would the patient rather a call back or a response via [facility]? Call back  Best Call Back Number:[phone]  Additional Information:        Type:  RX Refill Request    Who Called: pt  Refill or New Rx:refill  RX Name and Strength:losartan (COZAAR) 100 MG tablet  How is the patient currently taking it? (ex. 1XDay):1 a day  Is this a 30 day or 90 day RX:90  Preferred [facility] with phone number[facility]  Local or Mail Order:local  Ordering Provider:Dr. Delgado  Would the patient rather a call back or a response via [facility]? Call back  Best Call Back Number:[phone]  Additional Information:

## 2020-11-24 ENCOUNTER — TELEPHONE (OUTPATIENT)
Dept: FAMILY MEDICINE | Facility: CLINIC | Age: 70
End: 2020-11-24

## 2020-11-24 NOTE — TELEPHONE ENCOUNTER
----- Message from Minda Turpin sent at 11/24/2020  1:07 PM CST -----  Type:  Needs Medical Advice    Who Called: patient  Symptoms (please be specific): severe Gas  in side and lower back   How long has patient had these symptoms:  since friday  Pharmacy name and phone #: [id number] - [place], [place] - [address] AT [facility] [address] [phone] (Phone)  [phone] (Fax)      Would the patient rather a call back or a response via [facility]? Call   Best Call Back Number:  [phone]  Additional Information: patient says she took over the counter meds but nothing is working

## 2020-11-24 NOTE — TELEPHONE ENCOUNTER
Patient is having gas pains since Friday, lower back and stomach pains....She has tried OTC meds but it is not working..... she is going to go to urgent care

## 2020-11-25 ENCOUNTER — OFFICE VISIT (OUTPATIENT)
Dept: FAMILY MEDICINE | Facility: CLINIC | Age: 70
End: 2020-11-25
Payer: MEDICARE

## 2020-11-25 VITALS
OXYGEN SATURATION: 97 % | DIASTOLIC BLOOD PRESSURE: 62 MMHG | HEIGHT: 66 IN | BODY MASS INDEX: 29.12 KG/M2 | SYSTOLIC BLOOD PRESSURE: 114 MMHG | TEMPERATURE: 98 F | WEIGHT: 181.19 LBS | HEART RATE: 64 BPM

## 2020-11-25 DIAGNOSIS — R10.31 RIGHT LOWER QUADRANT ABDOMINAL PAIN: Primary | ICD-10-CM

## 2020-11-25 PROCEDURE — 3074F PR MOST RECENT SYSTOLIC BLOOD PRESSURE < 130 MM HG: ICD-10-PCS | Mod: HCNC,CPTII,S$GLB, | Performed by: INTERNAL MEDICINE

## 2020-11-25 PROCEDURE — 1159F MED LIST DOCD IN RCRD: CPT | Mod: HCNC,S$GLB,, | Performed by: INTERNAL MEDICINE

## 2020-11-25 PROCEDURE — 3078F DIAST BP <80 MM HG: CPT | Mod: HCNC,CPTII,S$GLB, | Performed by: INTERNAL MEDICINE

## 2020-11-25 PROCEDURE — 1125F AMNT PAIN NOTED PAIN PRSNT: CPT | Mod: HCNC,S$GLB,, | Performed by: INTERNAL MEDICINE

## 2020-11-25 PROCEDURE — 1125F PR PAIN SEVERITY QUANTIFIED, PAIN PRESENT: ICD-10-PCS | Mod: HCNC,S$GLB,, | Performed by: INTERNAL MEDICINE

## 2020-11-25 PROCEDURE — 99214 OFFICE O/P EST MOD 30 MIN: CPT | Mod: HCNC,S$GLB,, | Performed by: INTERNAL MEDICINE

## 2020-11-25 PROCEDURE — 99214 PR OFFICE/OUTPT VISIT, EST, LEVL IV, 30-39 MIN: ICD-10-PCS | Mod: HCNC,S$GLB,, | Performed by: INTERNAL MEDICINE

## 2020-11-25 PROCEDURE — 3008F BODY MASS INDEX DOCD: CPT | Mod: HCNC,CPTII,S$GLB, | Performed by: INTERNAL MEDICINE

## 2020-11-25 PROCEDURE — 3078F PR MOST RECENT DIASTOLIC BLOOD PRESSURE < 80 MM HG: ICD-10-PCS | Mod: HCNC,CPTII,S$GLB, | Performed by: INTERNAL MEDICINE

## 2020-11-25 PROCEDURE — 3288F FALL RISK ASSESSMENT DOCD: CPT | Mod: HCNC,CPTII,S$GLB, | Performed by: INTERNAL MEDICINE

## 2020-11-25 PROCEDURE — 1101F PT FALLS ASSESS-DOCD LE1/YR: CPT | Mod: HCNC,CPTII,S$GLB, | Performed by: INTERNAL MEDICINE

## 2020-11-25 PROCEDURE — 99999 PR PBB SHADOW E&M-EST. PATIENT-LVL III: ICD-10-PCS | Mod: PBBFAC,HCNC,, | Performed by: INTERNAL MEDICINE

## 2020-11-25 PROCEDURE — 99999 PR PBB SHADOW E&M-EST. PATIENT-LVL III: CPT | Mod: PBBFAC,HCNC,, | Performed by: INTERNAL MEDICINE

## 2020-11-25 PROCEDURE — 3074F SYST BP LT 130 MM HG: CPT | Mod: HCNC,CPTII,S$GLB, | Performed by: INTERNAL MEDICINE

## 2020-11-25 PROCEDURE — 3288F PR FALLS RISK ASSESSMENT DOCUMENTED: ICD-10-PCS | Mod: HCNC,CPTII,S$GLB, | Performed by: INTERNAL MEDICINE

## 2020-11-25 PROCEDURE — 1101F PR PT FALLS ASSESS DOC 0-1 FALLS W/OUT INJ PAST YR: ICD-10-PCS | Mod: HCNC,CPTII,S$GLB, | Performed by: INTERNAL MEDICINE

## 2020-11-25 PROCEDURE — 3008F PR BODY MASS INDEX (BMI) DOCUMENTED: ICD-10-PCS | Mod: HCNC,CPTII,S$GLB, | Performed by: INTERNAL MEDICINE

## 2020-11-25 PROCEDURE — 1159F PR MEDICATION LIST DOCUMENTED IN MEDICAL RECORD: ICD-10-PCS | Mod: HCNC,S$GLB,, | Performed by: INTERNAL MEDICINE

## 2020-11-25 NOTE — PROGRESS NOTES
[facility] Note    Chief Complaint      Chief Complaint   Patient presents with    Abdominal Pain     pt states that she is having really bad gas pains, also head pains as well.        History of Present Illness      Patito Domingo is a 70 y.o. female who presents today for   Chief Complaint   Patient presents with    Abdominal Pain     pt states that she is having really bad gas pains, also head pains as well.    .  Patient comes to appointment here for acute visit related to above . She complains of rlq abdominal pain . She has been having loose stools . She is on chronic pain meds .     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)        Past Surgical History:  Past Surgical History:   Procedure Laterality Date    CATARACT EXTRACTION Left 10/2017     SECTION      CHOLECYSTECTOMY      COLON SURGERY      cystoscope      CYSTOSCOPY W/ RETROGRADES Right 10/10/2019    Procedure: CYSTOSCOPY, WITH RETROGRADE PYELOGRAM;  Surgeon: Gen Isbell MD;  Location: [facility] OR;  Service: Urology;  Laterality: Right;    EYE SURGERY      HYSTERECTOMY  1996    LITHOTRIPSY      LIVER BIOPSY      carcinoid    URETEROSCOPY Right 10/10/2019    Procedure: URETEROSCOPY;  Surgeon: Gen Isbell MD;  Location: [facility] OR;  Service: Urology;  Laterality: Right;    UTERINE FIBROID SURGERY         Family History:  family history includes Alzheimer's disease in her father; Cancer in her  mother; No Known Problems in her son, son, son, and son; Stroke in her sister.    Social History:  Social History     Socioeconomic History    Marital status:      Spouse name: Not on file    Number of children: Not on file    Years of education: Not on file    Highest education level: Not on file   Occupational History    Occupation: disabled    Social Needs    Financial resource strain: Somewhat hard    Food insecurity     Worry: Never true     Inability: Never true    Transportation needs     Medical: Yes     Non-medical: Yes   Tobacco Use    Smoking status: Never Smoker    Smokeless tobacco: Never Used   Substance and Sexual Activity    Alcohol use: No     Alcohol/week: 0.0 standard drinks     Frequency: Never     Binge frequency: Never    Drug use: No    Sexual activity: Not Currently   Lifestyle    Physical activity     Days per week: 2 days     Minutes per session: 10 min    Stress: Very much   Relationships    Social connections     Talks on phone: Not on file     Gets together: Not on file     Attends Voodoo service: Not on file     Active member of club or organization: Not on file     Attends meetings of clubs or organizations: Not on file     Relationship status:    Other Topics Concern    Not on file   Social History Narrative    Pt lives with son       Review of Systems:   Review of Systems   Constitutional: Positive for malaise/fatigue. Negative for chills and fever.   HENT: Negative for congestion.    Respiratory: Negative for cough.    Cardiovascular: Negative for chest pain.   Gastrointestinal: Positive for abdominal pain, constipation and diarrhea. Negative for heartburn.   Genitourinary: Negative for dysuria and frequency.   Musculoskeletal: Negative for myalgias.   Neurological: Negative for dizziness.   Psychiatric/Behavioral: Negative for depression.         Medications:  Outpatient Encounter Medications as of 11/25/2020   Medication Sig  Dispense Refill    acetaminophen (TYLENOL) 500 MG tablet Take 1 tablet (500 mg total) by mouth every 6 (six) hours as needed for Pain. 60 tablet 1    atorvastatin (LIPITOR) 80 MG tablet Take 1 tablet (80 mg total) by mouth every evening. 90 tablet 3    bumetanide (BUMEX) 0.5 MG Tab Take 1 tablet (0.5 mg total) by mouth daily as needed. 30 tablet 0    cholestyramine (QUESTRAN) 4 gram packet       cloNIDine (CATAPRES) 0.1 MG tablet TAKE ONE TABLET BY MOUTH THREE TIMES DAILY AS NEEDED FOR signs of withdrawal  1    clopidogreL (PLAVIX) 75 mg tablet Take 1 tablet (75 mg total) by mouth once daily. 90 tablet 1    colestipoL (COLESTID) 1 gram Tab       CREON 24,000-76,000 -120,000 unit capsule       cyanocobalamin (VITAMIN B-12) 1000 MCG tablet Take 1 tablet (1,000 mcg total) by mouth once daily. 30 tablet 5    diclofenac sodium (VOLTAREN) 1 % Gel Apply the gel (2 g) to the affected area 4 times daily. Do not apply more than 8 g daily to any one affected joint of the upper extremities. 100 g 1    dicyclomine (BENTYL) 20 mg tablet Take 20 mg by mouth.      diphenoxylate-atropine 2.5-0.025 mg (LOMOTIL) 2.5-0.025 mg per tablet Take 1 tablet by mouth 4 (four) times daily as needed for Diarrhea (for episodic diarrhea). 30 tablet 0    ferrous sulfate (FEOSOL) 325 mg (65 mg iron) Tab tablet TAKE ONE TABLET BY MOUTH THREE TIMES PER WEEK 12 tablet 2    fluticasone propionate (FLONASE) 50 mcg/actuation nasal spray Use 1 spray in each nostril 1-2 times daily as needed 16 g 0    hydrocortisone valerate (WEST-KAREN) 0.2 % ointment Apply topically 1 gram 2 (two) times daily. for 7 days      levocetirizine (XYZAL) 5 MG tablet TAKE 1 TABLET(5 MG) BY MOUTH EVERY EVENING 90 tablet 0    losartan (COZAAR) 100 MG tablet Take 1 tablet (100 mg total) by mouth once daily. 90 tablet 1    metoprolol tartrate (LOPRESSOR) 50 MG tablet Take 1 tablet (50 mg total) by mouth 2 (two) times daily. 180 tablet 1    nitrofurantoin,  macrocrystal-monohydrate, (MACROBID) 100 MG capsule Take 1 capsule (100 mg total) by mouth 2 (two) times daily. 14 capsule 0    ondansetron (ZOFRAN-ODT) 4 MG TbDL Take 1 tablet (4 mg total) by mouth every 8 (eight) hours as needed. 10 tablet 0    oxyCODONE (ROXICODONE) 15 MG Tab TK 1 T PO  Q 4 H prn  0    pantoprazole (PROTONIX) 40 MG tablet TAKE 1 TABLET(40 MG) BY MOUTH EVERY DAY 90 tablet 0    promethazine (PHENERGAN) 12.5 MG Tab Take 1 tablet (12.5 mg total) by mouth every 8 (eight) hours as needed (nausea). 6 tablet 0    psyllium (METAMUCIL SUGAR FREE) Powd Take 1 packet by mouth 3 (three) times daily. 90 each 2    SUPREP BOWEL PREP KIT 17.5-3.13-1.6 gram SolR       tamsulosin (FLOMAX) 0.4 mg Cap Take 1 capsule (0.4 mg total) by mouth every evening. 30 capsule 0    traMADol (ULTRAM) 50 mg tablet       traZODone (DESYREL) 50 MG tablet Take 1 tablet (50 mg total) by mouth nightly as needed for Insomnia (May take 1-2 tabs nightly as needed for insomnia). 30 tablet 1    trolamine salicylate (ASPERCREME) 10 % cream Apply topically as needed.       No facility-administered encounter medications on file as of 11/25/2020.         Allergies:  Review of patient's allergies indicates:   Allergen Reactions    Epinephrine Anaphylaxis     Can cause  a Carcinoid Crisis    Contrast media Hives, Itching and Swelling    Ibuprofen Hives, Itching and Swelling    Iodinated contrast media     Sulfa (sulfonamide antibiotics) Hives, Itching and Swelling         Physical Exam      Vitals:    11/25/20 1056   BP: 114/62   Pulse: 64   Temp: 97.5 °F (36.4 °C)      Body mass index is 29.25 kg/m².    Physical Exam  Constitutional:       General: She is not in acute distress.     Appearance: Normal appearance. She is not ill-appearing or toxic-appearing.   Cardiovascular:      Rate and Rhythm: Normal rate and regular rhythm.      Pulses: Normal pulses.      Heart sounds: Normal heart sounds. No murmur.   Pulmonary:      Effort:  Pulmonary effort is normal.   Abdominal:      General: Bowel sounds are normal. There is no distension.      Tenderness: There is abdominal tenderness. There is no guarding or rebound.   Neurological:      General: No focal deficit present.      Mental Status: She is oriented to person, place, and time.   Psychiatric:         Mood and Affect: Mood normal.          Laboratory:  CBC:  No results for input(s): WBC, RBC, HGB, HCT, PLT, MCV, MCH, MCHC in the last 2160 hours.  CMP:  No results for input(s): GLU, CALCIUM, ALBUMIN, PROT, NA, K, CO2, CL, BUN, ALKPHOS, ALT, AST, BILITOT in the last 2160 hours.    Invalid input(s): CREATININ  URINALYSIS:  Recent Labs   Lab Result Units 10/15/20  1225   Color, UA  Yellow   Specific Gravity, UA  1.025   pH, UA  6.0   Protein, UA  Negative   Bacteria /hpf Many*   Nitrite, UA  Negative   Leukocytes, UA  1+*   Urobilinogen, UA EU/dL Negative      LIPIDS:  No results for input(s): TSH, HDL, CHOL, TRIG, LDLCALC, CHOLHDL, NONHDLCHOL, TOTALCHOLEST in the last 2160 hours.  TSH:  No results for input(s): TSH in the last 2160 hours.  A1C:  No results for input(s): HGBA1C in the last 2160 hours.    Radiology:        Assessment:     Patito Domingo is a 70 y.o.female with:    Right lower quadrant abdominal pain  -     US Abdomen Complete; Future; Expected date: 11/25/2020          Plan:     Problem List Items Addressed This Visit        GI    Right lower quadrant abdominal pain - Primary    Overview     Check abdominal u/s   Possible constipation   Will make further recs when u/s reviewed                As above, continue current medications and maintain follow up with specialists.  Return to clinic in as scheduled     Frederick W Dantagnan [facility] [place]

## 2020-11-30 ENCOUNTER — EXTERNAL CHRONIC CARE MANAGEMENT (OUTPATIENT)
Dept: PRIMARY CARE CLINIC | Facility: CLINIC | Age: 70
End: 2020-11-30
Payer: MEDICARE

## 2020-11-30 PROCEDURE — 99490 CHRNC CARE MGMT STAFF 1ST 20: CPT | Mod: S$GLB,,, | Performed by: INTERNAL MEDICINE

## 2020-11-30 PROCEDURE — G2058 PR CHRON CARE MGMT, EA ADDTL 20 MINS: ICD-10-PCS | Mod: S$GLB,,, | Performed by: INTERNAL MEDICINE

## 2020-11-30 PROCEDURE — 99490 PR CHRONIC CARE MGMT, 1ST 20 MIN: ICD-10-PCS | Mod: S$GLB,,, | Performed by: INTERNAL MEDICINE

## 2020-11-30 PROCEDURE — G2058 CCM ADD 20MIN: HCPCS | Mod: S$GLB,,, | Performed by: INTERNAL MEDICINE

## 2020-12-09 ENCOUNTER — TELEPHONE (OUTPATIENT)
Dept: FAMILY MEDICINE | Facility: CLINIC | Age: 70
End: 2020-12-09

## 2020-12-09 NOTE — TELEPHONE ENCOUNTER
----- Message from Chely Monaco sent at 12/9/2020  8:35 AM CST -----  Contact: [phone]  Type:  Same Day Appointment Request    Caller is requesting a same day appointment.  Caller declined first available appointment listed below.    Name of Caller: Pt  When is the first available appointment? 2/10  Symptoms: pt fell on Saturday Night and hit her Back and hurt her Right Arm, pt is Currently in the Office for a appt that she was told she had today  Best Call Back Number:[phone]

## 2020-12-09 NOTE — TELEPHONE ENCOUNTER
Spoke with pt in regards of her needing an appointment for today while she was in the clinic. Informed pt that she did not have an appointment today, and that Dr. Delgado has a full schedule for today, and that all other providers were full as well. Was able to make patient an appointment for 12/14/2020 for 8:30 am to see Dr. Delgado. Pt verbalized understanding of message.

## 2020-12-09 NOTE — TELEPHONE ENCOUNTER
----- Message from Chely Monaco sent at 12/9/2020  8:35 AM CST -----  Contact: [phone]  Type:  Same Day Appointment Request    Caller is requesting a same day appointment.  Caller declined first available appointment listed below.    Name of Caller: Pt  When is the first available appointment? 2/10  Symptoms: pt fell on Saturday Night and hit her Back and hurt her Right Arm, pt is Currently in the Office for a appt that she was told she had today  Best Call Back Number:[phone]

## 2020-12-14 ENCOUNTER — OFFICE VISIT (OUTPATIENT)
Dept: FAMILY MEDICINE | Facility: CLINIC | Age: 70
End: 2020-12-14
Payer: MEDICARE

## 2020-12-14 VITALS
OXYGEN SATURATION: 99 % | DIASTOLIC BLOOD PRESSURE: 64 MMHG | TEMPERATURE: 98 F | RESPIRATION RATE: 18 BRPM | WEIGHT: 183.06 LBS | BODY MASS INDEX: 29.42 KG/M2 | HEART RATE: 56 BPM | HEIGHT: 66 IN | SYSTOLIC BLOOD PRESSURE: 118 MMHG

## 2020-12-14 DIAGNOSIS — R53.1 FUNCTIONAL WEAKNESS: ICD-10-CM

## 2020-12-14 PROCEDURE — 3288F FALL RISK ASSESSMENT DOCD: CPT | Mod: HCNC,CPTII,S$GLB, | Performed by: INTERNAL MEDICINE

## 2020-12-14 PROCEDURE — 1159F PR MEDICATION LIST DOCUMENTED IN MEDICAL RECORD: ICD-10-PCS | Mod: HCNC,S$GLB,, | Performed by: INTERNAL MEDICINE

## 2020-12-14 PROCEDURE — 3074F PR MOST RECENT SYSTOLIC BLOOD PRESSURE < 130 MM HG: ICD-10-PCS | Mod: HCNC,CPTII,S$GLB, | Performed by: INTERNAL MEDICINE

## 2020-12-14 PROCEDURE — 3008F BODY MASS INDEX DOCD: CPT | Mod: HCNC,CPTII,S$GLB, | Performed by: INTERNAL MEDICINE

## 2020-12-14 PROCEDURE — 3078F DIAST BP <80 MM HG: CPT | Mod: HCNC,CPTII,S$GLB, | Performed by: INTERNAL MEDICINE

## 2020-12-14 PROCEDURE — 1126F PR PAIN SEVERITY QUANTIFIED, NO PAIN PRESENT: ICD-10-PCS | Mod: HCNC,S$GLB,, | Performed by: INTERNAL MEDICINE

## 2020-12-14 PROCEDURE — 99213 OFFICE O/P EST LOW 20 MIN: CPT | Mod: HCNC,S$GLB,, | Performed by: INTERNAL MEDICINE

## 2020-12-14 PROCEDURE — 3074F SYST BP LT 130 MM HG: CPT | Mod: HCNC,CPTII,S$GLB, | Performed by: INTERNAL MEDICINE

## 2020-12-14 PROCEDURE — 1126F AMNT PAIN NOTED NONE PRSNT: CPT | Mod: HCNC,S$GLB,, | Performed by: INTERNAL MEDICINE

## 2020-12-14 PROCEDURE — 1159F MED LIST DOCD IN RCRD: CPT | Mod: HCNC,S$GLB,, | Performed by: INTERNAL MEDICINE

## 2020-12-14 PROCEDURE — 1101F PR PT FALLS ASSESS DOC 0-1 FALLS W/OUT INJ PAST YR: ICD-10-PCS | Mod: HCNC,CPTII,S$GLB, | Performed by: INTERNAL MEDICINE

## 2020-12-14 PROCEDURE — 99999 PR PBB SHADOW E&M-EST. PATIENT-LVL V: ICD-10-PCS | Mod: PBBFAC,HCNC,, | Performed by: INTERNAL MEDICINE

## 2020-12-14 PROCEDURE — 3008F PR BODY MASS INDEX (BMI) DOCUMENTED: ICD-10-PCS | Mod: HCNC,CPTII,S$GLB, | Performed by: INTERNAL MEDICINE

## 2020-12-14 PROCEDURE — 3078F PR MOST RECENT DIASTOLIC BLOOD PRESSURE < 80 MM HG: ICD-10-PCS | Mod: HCNC,CPTII,S$GLB, | Performed by: INTERNAL MEDICINE

## 2020-12-14 PROCEDURE — 99999 PR PBB SHADOW E&M-EST. PATIENT-LVL V: CPT | Mod: PBBFAC,HCNC,, | Performed by: INTERNAL MEDICINE

## 2020-12-14 PROCEDURE — 1101F PT FALLS ASSESS-DOCD LE1/YR: CPT | Mod: HCNC,CPTII,S$GLB, | Performed by: INTERNAL MEDICINE

## 2020-12-14 PROCEDURE — 99213 PR OFFICE/OUTPT VISIT, EST, LEVL III, 20-29 MIN: ICD-10-PCS | Mod: HCNC,S$GLB,, | Performed by: INTERNAL MEDICINE

## 2020-12-14 PROCEDURE — 3288F PR FALLS RISK ASSESSMENT DOCUMENTED: ICD-10-PCS | Mod: HCNC,CPTII,S$GLB, | Performed by: INTERNAL MEDICINE

## 2020-12-14 RX ORDER — BUSPIRONE HYDROCHLORIDE 10 MG/1
10 TABLET ORAL 3 TIMES DAILY
Status: ON HOLD | COMMUNITY
Start: 2020-11-27 | End: 2021-04-12

## 2020-12-14 NOTE — PROGRESS NOTES
[facility] Note    Chief Complaint      Chief Complaint   Patient presents with    Fall    Dizziness       History of Present Illness      Patito Domingo is a 70 y.o. female who presents today for   Chief Complaint   Patient presents with    Fall    Dizziness   .  Patient comes to appointment here for acute visit related to above . She is having some issues with orthostasis . She understands she needs  to use her walker when first rising from sitting . 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)        Past Surgical History:  Past Surgical History:   Procedure Laterality Date    CATARACT EXTRACTION Left 10/2017     SECTION      CHOLECYSTECTOMY      COLON SURGERY      cystoscope      CYSTOSCOPY W/ RETROGRADES Right 10/10/2019    Procedure: CYSTOSCOPY, WITH RETROGRADE PYELOGRAM;  Surgeon: Gen Isbell MD;  [facility]  Service: Urology;  Laterality: Right;    EYE SURGERY      HYSTERECTOMY  1996    LITHOTRIPSY      LIVER BIOPSY      carcinoid    URETEROSCOPY Right 10/10/2019    Procedure: URETEROSCOPY;  Surgeon: Gen Isbell MD;  [facility];  Service: Urology;  Laterality: Right;    UTERINE FIBROID SURGERY         Family History:  family history includes Alzheimer's disease in her father; Cancer in her mother; No Known Problems in her son, son, son, and son; Stroke in her sister.    Social  History:  Social History     Socioeconomic History    Marital status:      Spouse name: Not on file    Number of children: Not on file    Years of education: Not on file    Highest education level: Not on file   Occupational History    Occupation: disabled    Social Needs    Financial resource strain: Somewhat hard    Food insecurity     Worry: Never true     Inability: Never true    Transportation needs     Medical: Yes     Non-medical: Yes   Tobacco Use    Smoking status: Never Smoker    Smokeless tobacco: Never Used   Substance and Sexual Activity    Alcohol use: No     Alcohol/week: 0.0 standard drinks     Frequency: Never     Binge frequency: Never    Drug use: No    Sexual activity: Not Currently   Lifestyle    Physical activity     Days per week: 2 days     Minutes per session: 10 min    Stress: Very much   Relationships    Social connections     Talks on phone: Not on file     Gets together: Not on file     Attends Druze service: Not on file     Active member of club or organization: Not on file     Attends meetings of clubs or organizations: Not on file     Relationship status:    Other Topics Concern    Not on file   Social History Narrative    Pt lives with son       Review of Systems:   Review of Systems   Constitutional: Negative for fever.   HENT: Negative for congestion, hearing loss and sinus pain.    Respiratory: Negative for cough.    Cardiovascular: Negative for chest pain, claudication and leg swelling.   Gastrointestinal: Negative for abdominal pain, constipation, diarrhea and heartburn.   Musculoskeletal: Positive for back pain, falls and joint pain.   Neurological: Positive for dizziness and weakness.   Psychiatric/Behavioral: Negative for depression. The patient is not nervous/anxious.          Medications:  Outpatient Encounter Medications as of 12/14/2020   Medication Sig Dispense Refill    acetaminophen (TYLENOL) 500 MG tablet Take 1  tablet (500 mg total) by mouth every 6 (six) hours as needed for Pain. 60 tablet 1    atorvastatin (LIPITOR) 80 MG tablet Take 1 tablet (80 mg total) by mouth every evening. 90 tablet 3    bumetanide (BUMEX) 0.5 MG Tab Take 1 tablet (0.5 mg total) by mouth daily as needed. 30 tablet 0    busPIRone (BUSPAR) 10 MG tablet TK 1 T PO TID      cholestyramine (QUESTRAN) 4 gram packet       cloNIDine (CATAPRES) 0.1 MG tablet TAKE ONE TABLET BY MOUTH THREE TIMES DAILY AS NEEDED FOR signs of withdrawal  1    clopidogreL (PLAVIX) 75 mg tablet Take 1 tablet (75 mg total) by mouth once daily. 90 tablet 1    colestipoL (COLESTID) 1 gram Tab       CREON 24,000-76,000 -120,000 unit capsule       cyanocobalamin (VITAMIN B-12) 1000 MCG tablet Take 1 tablet (1,000 mcg total) by mouth once daily. 30 tablet 5    diclofenac sodium (VOLTAREN) 1 % Gel Apply the gel (2 g) to the affected area 4 times daily. Do not apply more than 8 g daily to any one affected joint of the upper extremities. 100 g 1    dicyclomine (BENTYL) 20 mg tablet Take 20 mg by mouth.      diphenoxylate-atropine 2.5-0.025 mg (LOMOTIL) 2.5-0.025 mg per tablet Take 1 tablet by mouth 4 (four) times daily as needed for Diarrhea (for episodic diarrhea). 30 tablet 0    ferrous sulfate (FEOSOL) 325 mg (65 mg iron) Tab tablet TAKE ONE TABLET BY MOUTH THREE TIMES PER WEEK 12 tablet 2    fluticasone propionate (FLONASE) 50 mcg/actuation nasal spray Use 1 spray in each nostril 1-2 times daily as needed 16 g 0    hydrocortisone valerate (WEST-KAREN) 0.2 % ointment Apply topically 1 gram 2 (two) times daily. for 7 days      levocetirizine (XYZAL) 5 MG tablet TAKE 1 TABLET(5 MG) BY MOUTH EVERY EVENING 90 tablet 0    losartan (COZAAR) 100 MG tablet Take 1 tablet (100 mg total) by mouth once daily. 90 tablet 1    metoprolol tartrate (LOPRESSOR) 50 MG tablet Take 1 tablet (50 mg total) by mouth 2 (two) times daily. 180 tablet 1    nitrofurantoin,  macrocrystal-monohydrate, (MACROBID) 100 MG capsule Take 1 capsule (100 mg total) by mouth 2 (two) times daily. 14 capsule 0    ondansetron (ZOFRAN-ODT) 4 MG TbDL Take 1 tablet (4 mg total) by mouth every 8 (eight) hours as needed. 10 tablet 0    oxyCODONE (ROXICODONE) 15 MG Tab TK 1 T PO  Q 4 H prn  0    pantoprazole (PROTONIX) 40 MG tablet TAKE 1 TABLET(40 MG) BY MOUTH EVERY DAY 90 tablet 0    promethazine (PHENERGAN) 12.5 MG Tab Take 1 tablet (12.5 mg total) by mouth every 8 (eight) hours as needed (nausea). 6 tablet 0    psyllium (METAMUCIL SUGAR FREE) Powd Take 1 packet by mouth 3 (three) times daily. 90 each 2    SUPREP BOWEL PREP KIT 17.5-3.13-1.6 gram SolR       tamsulosin (FLOMAX) 0.4 mg Cap Take 1 capsule (0.4 mg total) by mouth every evening. 30 capsule 0    traMADol (ULTRAM) 50 mg tablet       traZODone (DESYREL) 50 MG tablet Take 1 tablet (50 mg total) by mouth nightly as needed for Insomnia (May take 1-2 tabs nightly as needed for insomnia). 30 tablet 1    trolamine salicylate (ASPERCREME) 10 % cream Apply topically as needed.       No facility-administered encounter medications on file as of 12/14/2020.         Allergies:  Review of patient's allergies indicates:   Allergen Reactions    Epinephrine Anaphylaxis     Can cause  a Carcinoid Crisis    Contrast media Hives, Itching and Swelling    Ibuprofen Hives, Itching and Swelling    Iodinated contrast media     Sulfa (sulfonamide antibiotics) Hives, Itching and Swelling         Physical Exam      Vitals:    12/14/20 0818   BP: 118/64   Pulse: (!) 56   Resp: 18   Temp: 97.5 °F (36.4 °C)      Body mass index is 29.55 kg/m².    Physical Exam  Constitutional:       Appearance: She is well-developed.   Eyes:      Pupils: Pupils are equal, round, and reactive to light.   Neck:      Musculoskeletal: Normal range of motion.      Thyroid: No thyromegaly.   Cardiovascular:      Rate and Rhythm: Normal rate.      Heart sounds: Normal heart sounds.  No murmur. No friction rub. No gallop.    Pulmonary:      Breath sounds: Normal breath sounds.   Abdominal:      General: Bowel sounds are normal.      Palpations: Abdomen is soft.   Musculoskeletal: Normal range of motion.      Right elbow: Tenderness found.   Lymphadenopathy:      Cervical: No cervical adenopathy.   Skin:     General: Skin is warm.      Findings: No rash.   Neurological:      Mental Status: She is alert and oriented to person, place, and time.      Cranial Nerves: No cranial nerve deficit.   Psychiatric:         Behavior: Behavior normal.          Laboratory:  CBC:  No results for input(s): WBC, RBC, HGB, HCT, PLT, MCV, MCH, MCHC in the last 2160 hours.  CMP:  No results for input(s): GLU, CALCIUM, ALBUMIN, PROT, NA, K, CO2, CL, BUN, ALKPHOS, ALT, AST, BILITOT in the last 2160 hours.    Invalid input(s): CREATININ  URINALYSIS:  Recent Labs   Lab Result Units 10/15/20  1225   Color, UA  Yellow   Specific Gravity, UA  1.025   pH, UA  6.0   Protein, UA  Negative   Bacteria /hpf Many*   Nitrite, UA  Negative   Leukocytes, UA  1+*   Urobilinogen, UA EU/dL Negative      LIPIDS:  No results for input(s): TSH, HDL, CHOL, TRIG, LDLCALC, CHOLHDL, NONHDLCHOL, TOTALCHOLEST in the last 2160 hours.  TSH:  No results for input(s): TSH in the last 2160 hours.  A1C:  No results for input(s): HGBA1C in the last 2160 hours.    Radiology:        Assessment:     Patito Domingo is a 70 y.o.female with:    Functional weakness          Plan:     Problem List Items Addressed This Visit        Other    Functional weakness    Overview     Will make referral for hh ot pt eval and treat                As above, continue current medications and maintain follow up with specialists.  Return to clinic in 6 months.      Frederick W Dantagnan [facility] [place]

## 2020-12-17 ENCOUNTER — TELEPHONE (OUTPATIENT)
Dept: HEMATOLOGY/ONCOLOGY | Facility: CLINIC | Age: 70
End: 2020-12-17

## 2020-12-18 PROCEDURE — G0180 MD CERTIFICATION HHA PATIENT: HCPCS | Mod: ,,, | Performed by: INTERNAL MEDICINE

## 2020-12-18 PROCEDURE — G0180 PR HOME HEALTH MD CERTIFICATION: ICD-10-PCS | Mod: ,,, | Performed by: INTERNAL MEDICINE

## 2020-12-21 ENCOUNTER — TELEPHONE (OUTPATIENT)
Dept: FAMILY MEDICINE | Facility: CLINIC | Age: 70
End: 2020-12-21

## 2020-12-21 DIAGNOSIS — R30.0 DYSURIA: Primary | ICD-10-CM

## 2020-12-21 RX ORDER — CIPROFLOXACIN 500 MG/1
500 TABLET ORAL 2 TIMES DAILY
Qty: 6 TABLET | Refills: 0 | Status: ON HOLD | OUTPATIENT
Start: 2020-12-21 | End: 2021-03-09

## 2020-12-21 NOTE — TELEPHONE ENCOUNTER
Patient still c/o of frequent urination , states oxybutynin er 10 mg is not working states she is having a lil burning when she urinates sometimes

## 2020-12-21 NOTE — TELEPHONE ENCOUNTER
----- Message from Madie Nelson sent at 12/21/2020 10:00 AM CST -----  Contact: Shaun Long [facility], [phone]  States they have a home health visit scheduled on 12/25 and want to move it up to 12/24. Please advise.

## 2020-12-21 NOTE — TELEPHONE ENCOUNTER
Pt is still having persistent urinary frequency and pt thought that Buspar was going to help with these symptoms after it was prescribed. Please advise and reach out to pt.

## 2020-12-28 ENCOUNTER — EXTERNAL HOME HEALTH (OUTPATIENT)
Dept: HOME HEALTH SERVICES | Facility: HOSPITAL | Age: 70
End: 2020-12-28
Payer: MEDICARE

## 2020-12-31 ENCOUNTER — EXTERNAL CHRONIC CARE MANAGEMENT (OUTPATIENT)
Dept: PRIMARY CARE CLINIC | Facility: CLINIC | Age: 70
End: 2020-12-31
Payer: MEDICARE

## 2020-12-31 PROCEDURE — 99490 PR CHRONIC CARE MGMT, 1ST 20 MIN: ICD-10-PCS | Mod: S$GLB,,, | Performed by: INTERNAL MEDICINE

## 2020-12-31 PROCEDURE — G2058 PR CHRON CARE MGMT, EA ADDTL 20 MINS: ICD-10-PCS | Mod: S$GLB,,, | Performed by: INTERNAL MEDICINE

## 2020-12-31 PROCEDURE — G2058 CCM ADD 20MIN: HCPCS | Mod: S$GLB,,, | Performed by: INTERNAL MEDICINE

## 2020-12-31 PROCEDURE — 99490 CHRNC CARE MGMT STAFF 1ST 20: CPT | Mod: S$GLB,,, | Performed by: INTERNAL MEDICINE

## 2021-01-22 DIAGNOSIS — I1A.0 RESISTANT HYPERTENSION: ICD-10-CM

## 2021-01-22 RX ORDER — METOPROLOL TARTRATE 50 MG/1
50 TABLET ORAL 2 TIMES DAILY
Qty: 180 TABLET | Refills: 1 | Status: SHIPPED | OUTPATIENT
Start: 2021-01-22 | End: 2021-02-22 | Stop reason: SDUPTHER

## 2021-01-27 ENCOUNTER — TELEPHONE (OUTPATIENT)
Dept: NEUROLOGY | Facility: HOSPITAL | Age: 71
End: 2021-01-27

## 2021-01-28 ENCOUNTER — OFFICE VISIT (OUTPATIENT)
Dept: NEUROLOGY | Facility: HOSPITAL | Age: 71
End: 2021-01-28
Attending: SURGERY
Payer: MEDICARE

## 2021-01-28 VITALS
SYSTOLIC BLOOD PRESSURE: 124 MMHG | DIASTOLIC BLOOD PRESSURE: 59 MMHG | WEIGHT: 179.56 LBS | HEIGHT: 66 IN | HEART RATE: 50 BPM | BODY MASS INDEX: 28.86 KG/M2 | TEMPERATURE: 98 F

## 2021-01-28 DIAGNOSIS — C7A.8 NEUROENDOCRINE CARCINOMA, UNKNOWN PRIMARY SITE: Primary | ICD-10-CM

## 2021-01-28 PROCEDURE — 99215 OFFICE O/P EST HI 40 MIN: CPT | Performed by: SURGERY

## 2021-01-28 RX ORDER — LANREOTIDE ACETATE 120 MG/.5ML
120 INJECTION SUBCUTANEOUS ONCE
Status: CANCELLED | OUTPATIENT
Start: 2021-02-01 | End: 2021-02-01

## 2021-01-28 RX ORDER — OXYBUTYNIN CHLORIDE 10 MG/1
10 TABLET, EXTENDED RELEASE ORAL DAILY
Status: ON HOLD | COMMUNITY
End: 2021-04-12

## 2021-01-29 ENCOUNTER — TELEPHONE (OUTPATIENT)
Dept: FAMILY MEDICINE | Facility: CLINIC | Age: 71
End: 2021-01-29

## 2021-01-31 ENCOUNTER — EXTERNAL CHRONIC CARE MANAGEMENT (OUTPATIENT)
Dept: PRIMARY CARE CLINIC | Facility: CLINIC | Age: 71
End: 2021-01-31
Payer: MEDICARE

## 2021-01-31 PROCEDURE — 99490 PR CHRONIC CARE MGMT, 1ST 20 MIN: ICD-10-PCS | Mod: S$GLB,,, | Performed by: INTERNAL MEDICINE

## 2021-01-31 PROCEDURE — 99490 CHRNC CARE MGMT STAFF 1ST 20: CPT | Mod: S$GLB,,, | Performed by: INTERNAL MEDICINE

## 2021-02-01 ENCOUNTER — TELEPHONE (OUTPATIENT)
Dept: NEUROLOGY | Facility: HOSPITAL | Age: 71
End: 2021-02-01

## 2021-02-01 DIAGNOSIS — D49.7 NEOPLASM OF UNSPECIFIED BEHAVIOR OF ENDOCRINE GLANDS AND OTHER PARTS OF NERVOUS SYSTEM: ICD-10-CM

## 2021-02-01 DIAGNOSIS — C7B.8 SECONDARY NEUROENDOCRINE TUMOR OF LIVER: ICD-10-CM

## 2021-02-01 DIAGNOSIS — C7B.02 METASTATIC MALIGNANT CARCINOID TUMOR TO LIVER: ICD-10-CM

## 2021-02-01 DIAGNOSIS — C7A.8 NEUROENDOCRINE CARCINOMA, UNKNOWN PRIMARY SITE: Primary | ICD-10-CM

## 2021-02-04 ENCOUNTER — HOSPITAL ENCOUNTER (OUTPATIENT)
Dept: RADIOLOGY | Facility: HOSPITAL | Age: 71
Discharge: HOME | End: 2021-02-04
Attending: SURGERY
Payer: MEDICARE

## 2021-02-04 DIAGNOSIS — C7A.8 NEUROENDOCRINE CARCINOMA, UNKNOWN PRIMARY SITE: ICD-10-CM

## 2021-02-04 PROCEDURE — 78815 PET IMAGE W/CT SKULL-THIGH: CPT | Mod: 26,PS,, | Performed by: RADIOLOGY

## 2021-02-04 PROCEDURE — 78815 PET IMAGE W/CT SKULL-THIGH: CPT | Mod: TC

## 2021-02-04 PROCEDURE — 78815 NM PET 68GA DOTATATE WHOLE BODY: ICD-10-PCS | Mod: 26,PS,, | Performed by: RADIOLOGY

## 2021-02-09 ENCOUNTER — TELEPHONE (OUTPATIENT)
Dept: INFUSION THERAPY | Facility: HOSPITAL | Age: 71
End: 2021-02-09

## 2021-02-10 DIAGNOSIS — B37.0 ORAL YEAST INFECTION: Primary | ICD-10-CM

## 2021-02-22 ENCOUNTER — OFFICE VISIT (OUTPATIENT)
Dept: FAMILY MEDICINE | Facility: CLINIC | Age: 71
End: 2021-02-22
Payer: MEDICARE

## 2021-02-22 VITALS
OXYGEN SATURATION: 99 % | SYSTOLIC BLOOD PRESSURE: 128 MMHG | DIASTOLIC BLOOD PRESSURE: 78 MMHG | TEMPERATURE: 98 F | HEIGHT: 66 IN | HEART RATE: 60 BPM | RESPIRATION RATE: 18 BRPM | WEIGHT: 184.06 LBS | BODY MASS INDEX: 29.58 KG/M2

## 2021-02-22 DIAGNOSIS — I10 ESSENTIAL HYPERTENSION: Primary | Chronic | ICD-10-CM

## 2021-02-22 DIAGNOSIS — D63.8 ANEMIA OF CHRONIC DISEASE: Chronic | ICD-10-CM

## 2021-02-22 DIAGNOSIS — I1A.0 RESISTANT HYPERTENSION: ICD-10-CM

## 2021-02-22 DIAGNOSIS — Z86.73 HISTORY OF CVA (CEREBROVASCULAR ACCIDENT): ICD-10-CM

## 2021-02-22 DIAGNOSIS — C7B.00 METASTATIC CARCINOID TUMOR: Chronic | ICD-10-CM

## 2021-02-22 PROCEDURE — 3288F FALL RISK ASSESSMENT DOCD: CPT | Mod: CPTII,S$GLB,, | Performed by: INTERNAL MEDICINE

## 2021-02-22 PROCEDURE — 99214 PR OFFICE/OUTPT VISIT, EST, LEVL IV, 30-39 MIN: ICD-10-PCS | Mod: S$GLB,,, | Performed by: INTERNAL MEDICINE

## 2021-02-22 PROCEDURE — 99999 PR PBB SHADOW E&M-EST. PATIENT-LVL V: CPT | Mod: PBBFAC,,, | Performed by: INTERNAL MEDICINE

## 2021-02-22 PROCEDURE — 3078F PR MOST RECENT DIASTOLIC BLOOD PRESSURE < 80 MM HG: ICD-10-PCS | Mod: CPTII,S$GLB,, | Performed by: INTERNAL MEDICINE

## 2021-02-22 PROCEDURE — 3008F BODY MASS INDEX DOCD: CPT | Mod: CPTII,S$GLB,, | Performed by: INTERNAL MEDICINE

## 2021-02-22 PROCEDURE — 1101F PT FALLS ASSESS-DOCD LE1/YR: CPT | Mod: CPTII,S$GLB,, | Performed by: INTERNAL MEDICINE

## 2021-02-22 PROCEDURE — 99999 PR PBB SHADOW E&M-EST. PATIENT-LVL V: ICD-10-PCS | Mod: PBBFAC,,, | Performed by: INTERNAL MEDICINE

## 2021-02-22 PROCEDURE — 1101F PR PT FALLS ASSESS DOC 0-1 FALLS W/OUT INJ PAST YR: ICD-10-PCS | Mod: CPTII,S$GLB,, | Performed by: INTERNAL MEDICINE

## 2021-02-22 PROCEDURE — 3008F PR BODY MASS INDEX (BMI) DOCUMENTED: ICD-10-PCS | Mod: CPTII,S$GLB,, | Performed by: INTERNAL MEDICINE

## 2021-02-22 PROCEDURE — 99214 OFFICE O/P EST MOD 30 MIN: CPT | Mod: S$GLB,,, | Performed by: INTERNAL MEDICINE

## 2021-02-22 PROCEDURE — 99499 RISK ADDL DX/OHS AUDIT: ICD-10-PCS | Mod: S$GLB,,, | Performed by: INTERNAL MEDICINE

## 2021-02-22 PROCEDURE — 1159F PR MEDICATION LIST DOCUMENTED IN MEDICAL RECORD: ICD-10-PCS | Mod: S$GLB,,, | Performed by: INTERNAL MEDICINE

## 2021-02-22 PROCEDURE — 1126F PR PAIN SEVERITY QUANTIFIED, NO PAIN PRESENT: ICD-10-PCS | Mod: S$GLB,,, | Performed by: INTERNAL MEDICINE

## 2021-02-22 PROCEDURE — 1159F MED LIST DOCD IN RCRD: CPT | Mod: S$GLB,,, | Performed by: INTERNAL MEDICINE

## 2021-02-22 PROCEDURE — 3288F PR FALLS RISK ASSESSMENT DOCUMENTED: ICD-10-PCS | Mod: CPTII,S$GLB,, | Performed by: INTERNAL MEDICINE

## 2021-02-22 PROCEDURE — 99499 UNLISTED E&M SERVICE: CPT | Mod: S$GLB,,, | Performed by: INTERNAL MEDICINE

## 2021-02-22 PROCEDURE — 1126F AMNT PAIN NOTED NONE PRSNT: CPT | Mod: S$GLB,,, | Performed by: INTERNAL MEDICINE

## 2021-02-22 PROCEDURE — 3078F DIAST BP <80 MM HG: CPT | Mod: CPTII,S$GLB,, | Performed by: INTERNAL MEDICINE

## 2021-02-22 PROCEDURE — 3074F SYST BP LT 130 MM HG: CPT | Mod: CPTII,S$GLB,, | Performed by: INTERNAL MEDICINE

## 2021-02-22 PROCEDURE — 3074F PR MOST RECENT SYSTOLIC BLOOD PRESSURE < 130 MM HG: ICD-10-PCS | Mod: CPTII,S$GLB,, | Performed by: INTERNAL MEDICINE

## 2021-02-23 ENCOUNTER — TELEPHONE (OUTPATIENT)
Dept: NEUROLOGY | Facility: HOSPITAL | Age: 71
End: 2021-02-23

## 2021-02-23 RX ORDER — METOPROLOL TARTRATE 50 MG/1
50 TABLET ORAL 2 TIMES DAILY
Qty: 180 TABLET | Refills: 1 | Status: ON HOLD | OUTPATIENT
Start: 2021-02-23 | End: 2021-04-12 | Stop reason: SDUPTHER

## 2021-02-23 RX ORDER — ATORVASTATIN CALCIUM 80 MG/1
80 TABLET, FILM COATED ORAL NIGHTLY
Qty: 90 TABLET | Refills: 3 | Status: ON HOLD | OUTPATIENT
Start: 2021-02-23 | End: 2021-03-15

## 2021-02-23 RX ORDER — LOSARTAN POTASSIUM 100 MG/1
100 TABLET ORAL DAILY
Qty: 90 TABLET | Refills: 1 | Status: SHIPPED | OUTPATIENT
Start: 2021-02-23 | End: 2021-05-21

## 2021-02-25 ENCOUNTER — INFUSION (OUTPATIENT)
Dept: INFUSION THERAPY | Facility: HOSPITAL | Age: 71
End: 2021-02-25
Attending: SURGERY
Payer: MEDICARE

## 2021-02-25 ENCOUNTER — OFFICE VISIT (OUTPATIENT)
Dept: NEUROLOGY | Facility: HOSPITAL | Age: 71
End: 2021-02-25
Attending: SURGERY
Payer: MEDICARE

## 2021-02-25 ENCOUNTER — TELEPHONE (OUTPATIENT)
Dept: NEUROLOGY | Facility: HOSPITAL | Age: 71
End: 2021-02-25

## 2021-02-25 VITALS
HEIGHT: 66 IN | SYSTOLIC BLOOD PRESSURE: 164 MMHG | HEART RATE: 58 BPM | TEMPERATURE: 98 F | DIASTOLIC BLOOD PRESSURE: 82 MMHG | BODY MASS INDEX: 28.38 KG/M2 | WEIGHT: 176.56 LBS

## 2021-02-25 VITALS — WEIGHT: 176.56 LBS | HEIGHT: 66 IN | BODY MASS INDEX: 28.38 KG/M2

## 2021-02-25 DIAGNOSIS — E34.0 CARCINOID SYNDROME: ICD-10-CM

## 2021-02-25 DIAGNOSIS — C7A.8 NEUROENDOCRINE CARCINOMA, UNKNOWN PRIMARY SITE: ICD-10-CM

## 2021-02-25 DIAGNOSIS — C7B.00 METASTATIC CARCINOID TUMOR: ICD-10-CM

## 2021-02-25 DIAGNOSIS — C7B.02 METASTATIC MALIGNANT CARCINOID TUMOR TO LIVER: Primary | ICD-10-CM

## 2021-02-25 DIAGNOSIS — C7B.02 METASTATIC MALIGNANT CARCINOID TUMOR TO LIVER: ICD-10-CM

## 2021-02-25 DIAGNOSIS — C7B.8 SECONDARY NEUROENDOCRINE TUMOR OF LIVER: ICD-10-CM

## 2021-02-25 DIAGNOSIS — C7B.00 METASTATIC CARCINOID TUMOR: Primary | ICD-10-CM

## 2021-02-25 PROCEDURE — 96372 THER/PROPH/DIAG INJ SC/IM: CPT

## 2021-02-25 PROCEDURE — 99215 OFFICE O/P EST HI 40 MIN: CPT | Mod: 25 | Performed by: SURGERY

## 2021-02-25 PROCEDURE — 63600175 PHARM REV CODE 636 W HCPCS: Mod: JG | Performed by: SURGERY

## 2021-02-25 RX ORDER — ERTAPENEM 1 G/1
INJECTION, POWDER, LYOPHILIZED, FOR SOLUTION INTRAMUSCULAR; INTRAVENOUS
Status: ON HOLD | COMMUNITY
End: 2021-03-15

## 2021-02-25 RX ORDER — LANREOTIDE ACETATE 120 MG/.5ML
120 INJECTION SUBCUTANEOUS ONCE
Status: COMPLETED | OUTPATIENT
Start: 2021-02-25 | End: 2021-02-25

## 2021-02-25 RX ORDER — LANREOTIDE ACETATE 120 MG/.5ML
120 INJECTION SUBCUTANEOUS ONCE
OUTPATIENT
Start: 2021-03-01 | End: 2021-03-01

## 2021-02-25 RX ADMIN — LANREOTIDE ACETATE 120 MG: 120 INJECTION SUBCUTANEOUS at 10:02

## 2021-02-28 ENCOUNTER — EXTERNAL CHRONIC CARE MANAGEMENT (OUTPATIENT)
Dept: PRIMARY CARE CLINIC | Facility: CLINIC | Age: 71
End: 2021-02-28
Payer: MEDICARE

## 2021-02-28 PROCEDURE — 99439 CHRNC CARE MGMT STAF EA ADDL: CPT | Mod: S$GLB,,, | Performed by: INTERNAL MEDICINE

## 2021-02-28 PROCEDURE — 99439 PR CHRONIC CARE MGMT, EA ADDTL 20 MIN: ICD-10-PCS | Mod: S$GLB,,, | Performed by: INTERNAL MEDICINE

## 2021-02-28 PROCEDURE — 99490 CHRNC CARE MGMT STAFF 1ST 20: CPT | Mod: S$GLB,,, | Performed by: INTERNAL MEDICINE

## 2021-02-28 PROCEDURE — 99490 PR CHRONIC CARE MGMT, 1ST 20 MIN: ICD-10-PCS | Mod: S$GLB,,, | Performed by: INTERNAL MEDICINE

## 2021-03-02 ENCOUNTER — CONFERENCE (OUTPATIENT)
Dept: NEUROLOGY | Facility: HOSPITAL | Age: 71
End: 2021-03-02

## 2021-03-05 ENCOUNTER — TELEPHONE (OUTPATIENT)
Dept: FAMILY MEDICINE | Facility: CLINIC | Age: 71
End: 2021-03-05

## 2021-03-08 ENCOUNTER — TELEPHONE (OUTPATIENT)
Dept: FAMILY MEDICINE | Facility: CLINIC | Age: 71
End: 2021-03-08

## 2021-03-08 ENCOUNTER — HOSPITAL ENCOUNTER (INPATIENT)
Facility: HOSPITAL | Age: 71
LOS: 9 days | Discharge: NURSING, REHAB OR LONG-TERM CARE | DRG: 083 | End: 2021-03-17
Attending: EMERGENCY MEDICINE | Admitting: PSYCHIATRY & NEUROLOGY
Payer: MEDICARE

## 2021-03-08 DIAGNOSIS — S06.5XAA SUBDURAL HEMATOMA: Primary | ICD-10-CM

## 2021-03-08 DIAGNOSIS — I1A.0 RESISTANT HYPERTENSION: Chronic | ICD-10-CM

## 2021-03-08 DIAGNOSIS — S06.5XAA SDH (SUBDURAL HEMATOMA): ICD-10-CM

## 2021-03-08 DIAGNOSIS — E78.5 HYPERLIPIDEMIA, UNSPECIFIED HYPERLIPIDEMIA TYPE: ICD-10-CM

## 2021-03-08 DIAGNOSIS — M25.551 RIGHT HIP PAIN: Primary | ICD-10-CM

## 2021-03-08 DIAGNOSIS — D49.7 NEOPLASM OF UNSPECIFIED BEHAVIOR OF ENDOCRINE GLANDS AND OTHER PARTS OF NERVOUS SYSTEM: ICD-10-CM

## 2021-03-08 DIAGNOSIS — C7B.09 CARCINOID TUMOR METASTATIC TO INTRA-ABDOMINAL LYMPH NODE: ICD-10-CM
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
## 2021-03-08 DIAGNOSIS — Z29.89 SEIZURE PROPHYLAXIS: ICD-10-CM

## 2021-03-08 DIAGNOSIS — S02.5XXA CLOSED FRACTURE OF TOOTH, INITIAL ENCOUNTER: ICD-10-CM

## 2021-03-08 DIAGNOSIS — E87.6 HYPOKALEMIA: ICD-10-CM

## 2021-03-08 DIAGNOSIS — Z74.09 IMPAIRED FUNCTIONAL MOBILITY, BALANCE, GAIT, AND ENDURANCE: ICD-10-CM

## 2021-03-08 DIAGNOSIS — I16.1 HYPERTENSIVE EMERGENCY: ICD-10-CM

## 2021-03-08 DIAGNOSIS — C7A.00 CARCINOID TUMOR METASTATIC TO INTRA-ABDOMINAL LYMPH NODE: ICD-10-CM

## 2021-03-08 DIAGNOSIS — R42 VERTIGO: ICD-10-CM
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
## 2021-03-08 PROCEDURE — 99223 1ST HOSP IP/OBS HIGH 75: CPT | Mod: ,,, | Performed by: NURSE PRACTITIONER

## 2021-03-08 PROCEDURE — 99223 PR INITIAL HOSPITAL CARE,LEVL III: ICD-10-PCS | Mod: GC,,, | Performed by: NEUROLOGICAL SURGERY

## 2021-03-08 PROCEDURE — 99285 EMERGENCY DEPT VISIT HI MDM: CPT | Mod: 25

## 2021-03-08 PROCEDURE — 93010 EKG 12-LEAD: ICD-10-PCS | Mod: ,,, | Performed by: INTERNAL MEDICINE

## 2021-03-08 PROCEDURE — 80061 LIPID PANEL: CPT | Performed by: NURSE PRACTITIONER

## 2021-03-08 PROCEDURE — 86803 HEPATITIS C AB TEST: CPT | Performed by: EMERGENCY MEDICINE

## 2021-03-08 PROCEDURE — 83036 HEMOGLOBIN GLYCOSYLATED A1C: CPT | Performed by: NURSE PRACTITIONER

## 2021-03-08 PROCEDURE — 93010 ELECTROCARDIOGRAM REPORT: CPT | Mod: ,,, | Performed by: INTERNAL MEDICINE

## 2021-03-08 PROCEDURE — 99291 PR CRITICAL CARE, E/M 30-74 MINUTES: ICD-10-PCS | Mod: GC,,, | Performed by: EMERGENCY MEDICINE

## 2021-03-08 PROCEDURE — 99223 PR INITIAL HOSPITAL CARE,LEVL III: ICD-10-PCS | Mod: ,,, | Performed by: NURSE PRACTITIONER

## 2021-03-08 PROCEDURE — 84443 ASSAY THYROID STIM HORMONE: CPT | Performed by: NURSE PRACTITIONER

## 2021-03-08 PROCEDURE — 99291 CRITICAL CARE FIRST HOUR: CPT | Mod: GC,,, | Performed by: EMERGENCY MEDICINE

## 2021-03-08 PROCEDURE — 12000002 HC ACUTE/MED SURGE SEMI-PRIVATE ROOM

## 2021-03-08 PROCEDURE — 86900 BLOOD TYPING SEROLOGIC ABO: CPT | Performed by: NURSE PRACTITIONER

## 2021-03-08 PROCEDURE — 99223 1ST HOSP IP/OBS HIGH 75: CPT | Mod: GC,,, | Performed by: NEUROLOGICAL SURGERY

## 2021-03-08 PROCEDURE — 93005 ELECTROCARDIOGRAM TRACING: CPT

## 2021-03-08 RX ORDER — METOPROLOL TARTRATE 50 MG/1
50 TABLET ORAL 2 TIMES DAILY
Status: DISCONTINUED | OUTPATIENT
Start: 2021-03-09 | End: 2021-03-11

## 2021-03-08 RX ORDER — LOSARTAN POTASSIUM 50 MG/1
100 TABLET ORAL DAILY
Status: DISCONTINUED | OUTPATIENT
Start: 2021-03-09 | End: 2021-03-17 | Stop reason: HOSPADM

## 2021-03-08 RX ORDER — LEVETIRACETAM 5 MG/ML
500 INJECTION INTRAVASCULAR EVERY 12 HOURS
Status: DISCONTINUED | OUTPATIENT
Start: 2021-03-09 | End: 2021-03-10

## 2021-03-08 RX ORDER — SODIUM CHLORIDE 0.9 % (FLUSH) 0.9 %
10 SYRINGE (ML) INJECTION
Status: DISCONTINUED | OUTPATIENT
Start: 2021-03-09 | End: 2021-03-17 | Stop reason: HOSPADM

## 2021-03-08 RX ORDER — OXYCODONE HYDROCHLORIDE 5 MG/1
10 TABLET ORAL
Status: DISCONTINUED | OUTPATIENT
Start: 2021-03-08 | End: 2021-03-08

## 2021-03-08 RX ORDER — ATORVASTATIN CALCIUM 20 MG/1
80 TABLET, FILM COATED ORAL NIGHTLY
Status: DISCONTINUED | OUTPATIENT
Start: 2021-03-09 | End: 2021-03-14

## 2021-03-08 RX ORDER — ACETAMINOPHEN 325 MG/1
650 TABLET ORAL EVERY 4 HOURS PRN
Status: DISCONTINUED | OUTPATIENT
Start: 2021-03-08 | End: 2021-03-13

## 2021-03-08 RX ORDER — LABETALOL HCL 20 MG/4 ML
10 SYRINGE (ML) INTRAVENOUS EVERY 4 HOURS PRN
Status: DISCONTINUED | OUTPATIENT
Start: 2021-03-09 | End: 2021-03-17 | Stop reason: HOSPADM

## 2021-03-08 RX ORDER — OXYCODONE HYDROCHLORIDE 5 MG/1
5 TABLET ORAL EVERY 4 HOURS PRN
Status: DISCONTINUED | OUTPATIENT
Start: 2021-03-09 | End: 2021-03-11

## 2021-03-09 LAB
ABO + RH BLD: NORMAL
ALBUMIN SERPL BCP-MCNC: 3.4 G/DL (ref 3.5–5.2)
ALP SERPL-CCNC: 104 U/L (ref 55–135)
ALT SERPL W/O P-5'-P-CCNC: 9 U/L (ref 10–44)
ANION GAP SERPL CALC-SCNC: 12 MMOL/L (ref 8–16)
APTT BLDCRRT: 23.3 SEC (ref 21–32)
AST SERPL-CCNC: 13 U/L (ref 10–40)
BACTERIA #/AREA URNS AUTO: ABNORMAL /HPF
BASOPHILS # BLD AUTO: 0.03 K/UL (ref 0–0.2)
BASOPHILS NFR BLD: 0.6 % (ref 0–1.9)
BILIRUB SERPL-MCNC: 0.6 MG/DL (ref 0.1–1)
BILIRUB UR QL STRIP: NEGATIVE
BLD GP AB SCN CELLS X3 SERPL QL: NORMAL
BUN SERPL-MCNC: 13 MG/DL (ref 8–23)
CALCIUM SERPL-MCNC: 8.8 MG/DL (ref 8.7–10.5)
CHLORIDE SERPL-SCNC: 102 MMOL/L (ref 95–110)
CHOLEST SERPL-MCNC: 137 MG/DL (ref 120–199)
CHOLEST/HDLC SERPL: 1.9 {RATIO} (ref 2–5)
CLARITY UR REFRACT.AUTO: ABNORMAL
CO2 SERPL-SCNC: 26 MMOL/L (ref 23–29)
COLOR UR AUTO: YELLOW
CREAT SERPL-MCNC: 1 MG/DL (ref 0.5–1.4)
DIFFERENTIAL METHOD: ABNORMAL
EOSINOPHIL # BLD AUTO: 0 K/UL (ref 0–0.5)
EOSINOPHIL NFR BLD: 0.8 % (ref 0–8)
ERYTHROCYTE [DISTWIDTH] IN BLOOD BY AUTOMATED COUNT: 13 % (ref 11.5–14.5)
EST. GFR  (AFRICAN AMERICAN): >60 ML/MIN/1.73 M^2
EST. GFR  (NON AFRICAN AMERICAN): 58 ML/MIN/1.73 M^2
ESTIMATED AVG GLUCOSE: 131 MG/DL (ref 68–131)
GLUCOSE SERPL-MCNC: 143 MG/DL (ref 70–110)
GLUCOSE UR QL STRIP: NEGATIVE
HBA1C MFR BLD: 6.2 % (ref 4–5.6)
HCT VFR BLD AUTO: 36.4 % (ref 37–48.5)
HCV AB SERPL QL IA: NEGATIVE
HDLC SERPL-MCNC: 73 MG/DL (ref 40–75)
HDLC SERPL: 53.3 % (ref 20–50)
HGB BLD-MCNC: 11.2 G/DL (ref 12–16)
HGB UR QL STRIP: NEGATIVE
IMM GRANULOCYTES # BLD AUTO: 0.02 K/UL (ref 0–0.04)
IMM GRANULOCYTES NFR BLD AUTO: 0.4 % (ref 0–0.5)
INR PPP: 1 (ref 0.8–1.2)
KETONES UR QL STRIP: ABNORMAL
LDLC SERPL CALC-MCNC: 48.6 MG/DL (ref 63–159)
LEUKOCYTE ESTERASE UR QL STRIP: ABNORMAL
LYMPHOCYTES # BLD AUTO: 1.1 K/UL (ref 1–4.8)
LYMPHOCYTES NFR BLD: 22.4 % (ref 18–48)
MAGNESIUM SERPL-MCNC: 1.2 MG/DL (ref 1.6–2.6)
MAGNESIUM SERPL-MCNC: 1.4 MG/DL (ref 1.6–2.6)
MCH RBC QN AUTO: 27.2 PG (ref 27–31)
MCHC RBC AUTO-ENTMCNC: 30.8 G/DL (ref 32–36)
MCV RBC AUTO: 88 FL (ref 82–98)
MICROSCOPIC COMMENT: ABNORMAL
MONOCYTES # BLD AUTO: 0.4 K/UL (ref 0.3–1)
MONOCYTES NFR BLD: 8 % (ref 4–15)
NEUTROPHILS # BLD AUTO: 3.4 K/UL (ref 1.8–7.7)
NEUTROPHILS NFR BLD: 67.8 % (ref 38–73)
NITRITE UR QL STRIP: POSITIVE
NONHDLC SERPL-MCNC: 64 MG/DL
NRBC BLD-RTO: 0 /100 WBC
PH UR STRIP: 6 [PH] (ref 5–8)
PHOSPHATE SERPL-MCNC: 2.6 MG/DL (ref 2.7–4.5)
PLATELET # BLD AUTO: 191 K/UL (ref 150–350)
PMV BLD AUTO: 10.8 FL (ref 9.2–12.9)
POCT GLUCOSE: 133 MG/DL (ref 70–110)
POTASSIUM SERPL-SCNC: 2.7 MMOL/L (ref 3.5–5.1)
POTASSIUM SERPL-SCNC: 3.8 MMOL/L (ref 3.5–5.1)
PROT SERPL-MCNC: 7.2 G/DL (ref 6–8.4)
PROT UR QL STRIP: NEGATIVE
PROTHROMBIN TIME: 11.1 SEC (ref 9–12.5)
RBC # BLD AUTO: 4.12 M/UL (ref 4–5.4)
RBC #/AREA URNS AUTO: 0 /HPF (ref 0–4)
SODIUM SERPL-SCNC: 140 MMOL/L (ref 136–145)
SP GR UR STRIP: 1.01 (ref 1–1.03)
SQUAMOUS #/AREA URNS AUTO: 7 /HPF
TRIGL SERPL-MCNC: 77 MG/DL (ref 30–150)
TSH SERPL DL<=0.005 MIU/L-ACNC: 0.58 UIU/ML (ref 0.4–4)
URN SPEC COLLECT METH UR: ABNORMAL
WBC # BLD AUTO: 5 K/UL (ref 3.9–12.7)
WBC #/AREA URNS AUTO: 19 /HPF (ref 0–5)

## 2021-03-09 PROCEDURE — 84100 ASSAY OF PHOSPHORUS: CPT | Performed by: NURSE PRACTITIONER

## 2021-03-09 PROCEDURE — 83735 ASSAY OF MAGNESIUM: CPT | Performed by: NURSE PRACTITIONER

## 2021-03-09 PROCEDURE — 83735 ASSAY OF MAGNESIUM: CPT | Mod: 91 | Performed by: PSYCHIATRY & NEUROLOGY

## 2021-03-09 PROCEDURE — 63600175 PHARM REV CODE 636 W HCPCS: Performed by: STUDENT IN AN ORGANIZED HEALTH CARE EDUCATION/TRAINING PROGRAM

## 2021-03-09 PROCEDURE — 99232 SBSQ HOSP IP/OBS MODERATE 35: CPT | Mod: GC,,, | Performed by: PSYCHIATRY & NEUROLOGY

## 2021-03-09 PROCEDURE — 87186 SC STD MICRODIL/AGAR DIL: CPT | Performed by: NURSE PRACTITIONER

## 2021-03-09 PROCEDURE — 25000003 PHARM REV CODE 250: Performed by: EMERGENCY MEDICINE

## 2021-03-09 PROCEDURE — 25000003 PHARM REV CODE 250: Performed by: NURSE PRACTITIONER

## 2021-03-09 PROCEDURE — 11000001 HC ACUTE MED/SURG PRIVATE ROOM

## 2021-03-09 PROCEDURE — 87086 URINE CULTURE/COLONY COUNT: CPT | Performed by: NURSE PRACTITIONER

## 2021-03-09 PROCEDURE — 84132 ASSAY OF SERUM POTASSIUM: CPT | Performed by: PSYCHIATRY & NEUROLOGY

## 2021-03-09 PROCEDURE — 80053 COMPREHEN METABOLIC PANEL: CPT | Performed by: NURSE PRACTITIONER

## 2021-03-09 PROCEDURE — 87088 URINE BACTERIA CULTURE: CPT | Performed by: NURSE PRACTITIONER

## 2021-03-09 PROCEDURE — 85730 THROMBOPLASTIN TIME PARTIAL: CPT | Performed by: NURSE PRACTITIONER

## 2021-03-09 PROCEDURE — 97162 PT EVAL MOD COMPLEX 30 MIN: CPT

## 2021-03-09 PROCEDURE — 97535 SELF CARE MNGMENT TRAINING: CPT

## 2021-03-09 PROCEDURE — 94761 N-INVAS EAR/PLS OXIMETRY MLT: CPT

## 2021-03-09 PROCEDURE — 25000003 PHARM REV CODE 250: Performed by: STUDENT IN AN ORGANIZED HEALTH CARE EDUCATION/TRAINING PROGRAM

## 2021-03-09 PROCEDURE — 97165 OT EVAL LOW COMPLEX 30 MIN: CPT

## 2021-03-09 PROCEDURE — 85025 COMPLETE CBC W/AUTO DIFF WBC: CPT | Performed by: NURSE PRACTITIONER

## 2021-03-09 PROCEDURE — 81001 URINALYSIS AUTO W/SCOPE: CPT | Performed by: NURSE PRACTITIONER

## 2021-03-09 PROCEDURE — 92610 EVALUATE SWALLOWING FUNCTION: CPT

## 2021-03-09 PROCEDURE — 99233 SBSQ HOSP IP/OBS HIGH 50: CPT | Mod: GC,,, | Performed by: NEUROLOGICAL SURGERY

## 2021-03-09 PROCEDURE — 92523 SPEECH SOUND LANG COMPREHEN: CPT

## 2021-03-09 PROCEDURE — 63600175 PHARM REV CODE 636 W HCPCS: Performed by: NURSE PRACTITIONER

## 2021-03-09 PROCEDURE — 99232 PR SUBSEQUENT HOSPITAL CARE,LEVL II: ICD-10-PCS | Mod: GC,,, | Performed by: PSYCHIATRY & NEUROLOGY

## 2021-03-09 PROCEDURE — 85610 PROTHROMBIN TIME: CPT | Performed by: NURSE PRACTITIONER

## 2021-03-09 PROCEDURE — 97530 THERAPEUTIC ACTIVITIES: CPT

## 2021-03-09 PROCEDURE — 87077 CULTURE AEROBIC IDENTIFY: CPT | Performed by: NURSE PRACTITIONER

## 2021-03-09 PROCEDURE — 99233 PR SUBSEQUENT HOSPITAL CARE,LEVL III: ICD-10-PCS | Mod: GC,,, | Performed by: NEUROLOGICAL SURGERY

## 2021-03-09 RX ORDER — SODIUM,POTASSIUM PHOSPHATES 280-250MG
2 POWDER IN PACKET (EA) ORAL
Status: DISCONTINUED | OUTPATIENT
Start: 2021-03-09 | End: 2021-03-09

## 2021-03-09 RX ORDER — MUPIROCIN 20 MG/G
OINTMENT TOPICAL 2 TIMES DAILY
Status: DISPENSED | OUTPATIENT
Start: 2021-03-09 | End: 2021-03-14

## 2021-03-09 RX ORDER — LANOLIN ALCOHOL/MO/W.PET/CERES
800 CREAM (GRAM) TOPICAL
Status: DISCONTINUED | OUTPATIENT
Start: 2021-03-09 | End: 2021-03-09

## 2021-03-09 RX ORDER — NICARDIPINE HYDROCHLORIDE 0.2 MG/ML
0-15 INJECTION INTRAVENOUS CONTINUOUS
Status: DISCONTINUED | OUTPATIENT
Start: 2021-03-09 | End: 2021-03-09

## 2021-03-09 RX ORDER — POTASSIUM CHLORIDE 7.45 MG/ML
40 INJECTION INTRAVENOUS ONCE
Status: COMPLETED | OUTPATIENT
Start: 2021-03-09 | End: 2021-03-09

## 2021-03-09 RX ORDER — HEPARIN SODIUM 5000 [USP'U]/ML
5000 INJECTION, SOLUTION INTRAVENOUS; SUBCUTANEOUS EVERY 8 HOURS
Status: DISCONTINUED | OUTPATIENT
Start: 2021-03-09 | End: 2021-03-16

## 2021-03-09 RX ORDER — LANOLIN ALCOHOL/MO/W.PET/CERES
1000 CREAM (GRAM) TOPICAL DAILY
Status: DISCONTINUED | OUTPATIENT
Start: 2021-03-09 | End: 2021-03-17 | Stop reason: HOSPADM

## 2021-03-09 RX ADMIN — OXYCODONE 5 MG: 5 TABLET ORAL at 11:03

## 2021-03-09 RX ADMIN — HEPARIN SODIUM 5000 UNITS: 5000 INJECTION INTRAVENOUS; SUBCUTANEOUS at 09:03

## 2021-03-09 RX ADMIN — LOSARTAN POTASSIUM 100 MG: 50 TABLET, FILM COATED ORAL at 09:03

## 2021-03-09 RX ADMIN — POTASSIUM CHLORIDE 20 MEQ: 7.46 INJECTION, SOLUTION INTRAVENOUS at 03:03

## 2021-03-09 RX ADMIN — LEVETIRACETAM 500 MG: 5 INJECTION INTRAVENOUS at 12:03

## 2021-03-09 RX ADMIN — LEVETIRACETAM 500 MG: 5 INJECTION INTRAVENOUS at 09:03

## 2021-03-09 RX ADMIN — HEPARIN SODIUM 5000 UNITS: 5000 INJECTION INTRAVENOUS; SUBCUTANEOUS at 01:03

## 2021-03-09 RX ADMIN — Medication 10 MG: at 12:03

## 2021-03-09 RX ADMIN — NICARDIPINE HYDROCHLORIDE 5 MG/HR: 0.2 INJECTION, SOLUTION INTRAVENOUS at 12:03

## 2021-03-09 RX ADMIN — Medication 800 MG: at 01:03

## 2021-03-09 RX ADMIN — NICARDIPINE HYDROCHLORIDE 2.5 MG/HR: 0.2 INJECTION, SOLUTION INTRAVENOUS at 02:03

## 2021-03-09 RX ADMIN — METOPROLOL TARTRATE 50 MG: 50 TABLET, FILM COATED ORAL at 09:03

## 2021-03-09 RX ADMIN — OXYCODONE 5 MG: 5 TABLET ORAL at 09:03

## 2021-03-09 RX ADMIN — OXYCODONE 5 MG: 5 TABLET ORAL at 01:03

## 2021-03-09 RX ADMIN — OXYCODONE 5 MG: 5 TABLET ORAL at 04:03

## 2021-03-09 RX ADMIN — OXYCODONE 5 MG: 5 TABLET ORAL at 12:03

## 2021-03-09 RX ADMIN — OXYCODONE 5 MG: 5 TABLET ORAL at 06:03

## 2021-03-09 RX ADMIN — NICARDIPINE HYDROCHLORIDE 5 MG/HR: 0.2 INJECTION, SOLUTION INTRAVENOUS at 04:03

## 2021-03-09 RX ADMIN — CYANOCOBALAMIN TAB 1000 MCG 1000 MCG: 1000 TAB at 12:03

## 2021-03-09 RX ADMIN — ATORVASTATIN CALCIUM 80 MG: 20 TABLET, FILM COATED ORAL at 09:03

## 2021-03-09 RX ADMIN — MUPIROCIN: 20 OINTMENT TOPICAL at 09:03

## 2021-03-09 RX ADMIN — POTASSIUM BICARBONATE 40 MEQ: 391 TABLET, EFFERVESCENT ORAL at 04:03

## 2021-03-10 ENCOUNTER — TELEPHONE (OUTPATIENT)
Dept: NEUROSURGERY | Facility: CLINIC | Age: 71
End: 2021-03-10

## 2021-03-10 DIAGNOSIS — S06.5XAA SDH (SUBDURAL HEMATOMA): Primary | ICD-10-CM

## 2021-03-10 LAB
ALBUMIN SERPL BCP-MCNC: 3 G/DL (ref 3.5–5.2)
ALP SERPL-CCNC: 88 U/L (ref 55–135)
ALT SERPL W/O P-5'-P-CCNC: 7 U/L (ref 10–44)
ANION GAP SERPL CALC-SCNC: 8 MMOL/L (ref 8–16)
ASCENDING AORTA: 2.54 CM
AST SERPL-CCNC: 10 U/L (ref 10–40)
AV INDEX (PROSTH): 0.88
AV MEAN GRADIENT: 3 MMHG
AV PEAK GRADIENT: 5 MMHG
AV VALVE AREA: 3.13 CM2
AV VELOCITY RATIO: 0.77
BASOPHILS # BLD AUTO: 0.03 K/UL (ref 0–0.2)
BASOPHILS NFR BLD: 0.6 % (ref 0–1.9)
BILIRUB SERPL-MCNC: 0.7 MG/DL (ref 0.1–1)
BSA FOR ECHO PROCEDURE: 1.96 M2
BUN SERPL-MCNC: 21 MG/DL (ref 8–23)
CALCIUM SERPL-MCNC: 8.8 MG/DL (ref 8.7–10.5)
CHLORIDE SERPL-SCNC: 106 MMOL/L (ref 95–110)
CO2 SERPL-SCNC: 28 MMOL/L (ref 23–29)
CREAT SERPL-MCNC: 1.2 MG/DL (ref 0.5–1.4)
CV ECHO LV RWT: 0.34 CM
DIFFERENTIAL METHOD: ABNORMAL
DOP CALC AO PEAK VEL: 1.14 M/S
DOP CALC AO VTI: 25.87 CM
DOP CALC LVOT AREA: 3.6 CM2
DOP CALC LVOT DIAMETER: 2.13 CM
DOP CALC LVOT PEAK VEL: 0.88 M/S
DOP CALC LVOT STROKE VOLUME: 81.09 CM3
DOP CALCLVOT PEAK VEL VTI: 22.77 CM
ECHO LV POSTERIOR WALL: 0.91 CM (ref 0.6–1.1)
EOSINOPHIL # BLD AUTO: 0.2 K/UL (ref 0–0.5)
EOSINOPHIL NFR BLD: 2.9 % (ref 0–8)
ERYTHROCYTE [DISTWIDTH] IN BLOOD BY AUTOMATED COUNT: 13.4 % (ref 11.5–14.5)
EST. GFR  (AFRICAN AMERICAN): 53.7 ML/MIN/1.73 M^2
EST. GFR  (NON AFRICAN AMERICAN): 46.5 ML/MIN/1.73 M^2
FRACTIONAL SHORTENING: 43 % (ref 28–44)
GLUCOSE SERPL-MCNC: 143 MG/DL (ref 70–110)
HCT VFR BLD AUTO: 34.5 % (ref 37–48.5)
HGB BLD-MCNC: 10.8 G/DL (ref 12–16)
IMM GRANULOCYTES # BLD AUTO: 0.02 K/UL (ref 0–0.04)
IMM GRANULOCYTES NFR BLD AUTO: 0.4 % (ref 0–0.5)
INTERVENTRICULAR SEPTUM: 0.94 CM (ref 0.6–1.1)
LA MAJOR: 4.28 CM
LA MINOR: 4.38 CM
LA WIDTH: 3.13 CM
LEFT ATRIUM SIZE: 2.86 CM
LEFT ATRIUM VOLUME INDEX MOD: 11.5 ML/M2
LEFT ATRIUM VOLUME INDEX: 17.2 ML/M2
LEFT ATRIUM VOLUME MOD: 22.08 CM3
LEFT ATRIUM VOLUME: 32.94 CM3
LEFT INTERNAL DIMENSION IN SYSTOLE: 3.03 CM (ref 2.1–4)
LEFT VENTRICLE DIASTOLIC VOLUME INDEX: 40.63 ML/M2
LEFT VENTRICLE DIASTOLIC VOLUME: 78 ML
LEFT VENTRICLE MASS INDEX: 94 G/M2
LEFT VENTRICLE SYSTOLIC VOLUME INDEX: 18.6 ML/M2
LEFT VENTRICLE SYSTOLIC VOLUME: 35.72 ML
LEFT VENTRICULAR INTERNAL DIMENSION IN DIASTOLE: 5.3 CM (ref 3.5–6)
LEFT VENTRICULAR MASS: 180.85 G
LYMPHOCYTES # BLD AUTO: 1.3 K/UL (ref 1–4.8)
LYMPHOCYTES NFR BLD: 24.2 % (ref 18–48)
MAGNESIUM SERPL-MCNC: 1.6 MG/DL (ref 1.6–2.6)
MCH RBC QN AUTO: 27.8 PG (ref 27–31)
MCHC RBC AUTO-ENTMCNC: 31.3 G/DL (ref 32–36)
MCV RBC AUTO: 89 FL (ref 82–98)
MONOCYTES # BLD AUTO: 0.5 K/UL (ref 0.3–1)
MONOCYTES NFR BLD: 10.3 % (ref 4–15)
MV A" WAVE DURATION": 11.13 MSEC
NEUTROPHILS # BLD AUTO: 3.2 K/UL (ref 1.8–7.7)
NEUTROPHILS NFR BLD: 61.6 % (ref 38–73)
NRBC BLD-RTO: 0 /100 WBC
PHOSPHATE SERPL-MCNC: 2.3 MG/DL (ref 2.7–4.5)
PLATELET # BLD AUTO: 188 K/UL (ref 150–350)
PMV BLD AUTO: 11.4 FL (ref 9.2–12.9)
POTASSIUM SERPL-SCNC: 3.4 MMOL/L (ref 3.5–5.1)
PROT SERPL-MCNC: 6.3 G/DL (ref 6–8.4)
PULM VEIN S/D RATIO: 1.32
PV PEAK D VEL: 0.37 M/S
PV PEAK S VEL: 0.49 M/S
RA MAJOR: 4.45 CM
RA PRESSURE: 3 MMHG
RA WIDTH: 2.66 CM
RBC # BLD AUTO: 3.88 M/UL (ref 4–5.4)
RIGHT VENTRICULAR END-DIASTOLIC DIMENSION: 3.23 CM
RV TISSUE DOPPLER FREE WALL SYSTOLIC VELOCITY 1 (APICAL 4 CHAMBER VIEW): 11.08 CM/S
SINUS: 2.71 CM
SODIUM SERPL-SCNC: 142 MMOL/L (ref 136–145)
STJ: 2.34 CM
TDI LATERAL: 0.04 M/S
TDI SEPTAL: 0.05 M/S
TDI: 0.05 M/S
TRICUSPID ANNULAR PLANE SYSTOLIC EXCURSION: 1.95 CM
WBC # BLD AUTO: 5.17 K/UL (ref 3.9–12.7)

## 2021-03-10 PROCEDURE — 63600175 PHARM REV CODE 636 W HCPCS: Performed by: STUDENT IN AN ORGANIZED HEALTH CARE EDUCATION/TRAINING PROGRAM

## 2021-03-10 PROCEDURE — 25000003 PHARM REV CODE 250: Performed by: STUDENT IN AN ORGANIZED HEALTH CARE EDUCATION/TRAINING PROGRAM

## 2021-03-10 PROCEDURE — 25000003 PHARM REV CODE 250: Performed by: INTERNAL MEDICINE

## 2021-03-10 PROCEDURE — 99232 PR SUBSEQUENT HOSPITAL CARE,LEVL II: ICD-10-PCS | Mod: ,,, | Performed by: INTERNAL MEDICINE

## 2021-03-10 PROCEDURE — 25000003 PHARM REV CODE 250: Performed by: NURSE PRACTITIONER

## 2021-03-10 PROCEDURE — 36415 COLL VENOUS BLD VENIPUNCTURE: CPT | Performed by: NURSE PRACTITIONER

## 2021-03-10 PROCEDURE — 99232 SBSQ HOSP IP/OBS MODERATE 35: CPT | Mod: ,,, | Performed by: INTERNAL MEDICINE

## 2021-03-10 PROCEDURE — 11000001 HC ACUTE MED/SURG PRIVATE ROOM

## 2021-03-10 PROCEDURE — 83735 ASSAY OF MAGNESIUM: CPT | Performed by: NURSE PRACTITIONER

## 2021-03-10 PROCEDURE — 85025 COMPLETE CBC W/AUTO DIFF WBC: CPT | Performed by: NURSE PRACTITIONER

## 2021-03-10 PROCEDURE — 80053 COMPREHEN METABOLIC PANEL: CPT | Performed by: NURSE PRACTITIONER

## 2021-03-10 PROCEDURE — 84100 ASSAY OF PHOSPHORUS: CPT | Performed by: NURSE PRACTITIONER

## 2021-03-10 RX ORDER — AMLODIPINE BESYLATE 5 MG/1
5 TABLET ORAL DAILY
Status: DISCONTINUED | OUTPATIENT
Start: 2021-03-10 | End: 2021-03-11

## 2021-03-10 RX ORDER — LEVETIRACETAM 500 MG/1
500 TABLET ORAL 2 TIMES DAILY
Status: COMPLETED | OUTPATIENT
Start: 2021-03-10 | End: 2021-03-15

## 2021-03-10 RX ADMIN — MUPIROCIN: 20 OINTMENT TOPICAL at 01:03

## 2021-03-10 RX ADMIN — Medication 10 MG: at 09:03

## 2021-03-10 RX ADMIN — DIBASIC SODIUM PHOSPHATE, MONOBASIC POTASSIUM PHOSPHATE AND MONOBASIC SODIUM PHOSPHATE 2 TABLET: 852; 155; 130 TABLET ORAL at 06:03

## 2021-03-10 RX ADMIN — OXYCODONE 5 MG: 5 TABLET ORAL at 05:03

## 2021-03-10 RX ADMIN — LOSARTAN POTASSIUM 100 MG: 50 TABLET, FILM COATED ORAL at 09:03

## 2021-03-10 RX ADMIN — HEPARIN SODIUM 5000 UNITS: 5000 INJECTION INTRAVENOUS; SUBCUTANEOUS at 05:03

## 2021-03-10 RX ADMIN — POTASSIUM BICARBONATE 40 MEQ: 391 TABLET, EFFERVESCENT ORAL at 04:03

## 2021-03-10 RX ADMIN — DIBASIC SODIUM PHOSPHATE, MONOBASIC POTASSIUM PHOSPHATE AND MONOBASIC SODIUM PHOSPHATE 2 TABLET: 852; 155; 130 TABLET ORAL at 04:03

## 2021-03-10 RX ADMIN — LEVETIRACETAM 500 MG: 500 TABLET ORAL at 09:03

## 2021-03-10 RX ADMIN — LEVETIRACETAM 500 MG: 5 INJECTION INTRAVENOUS at 09:03

## 2021-03-10 RX ADMIN — OXYCODONE 5 MG: 5 TABLET ORAL at 01:03

## 2021-03-10 RX ADMIN — METOPROLOL TARTRATE 50 MG: 50 TABLET, FILM COATED ORAL at 09:03

## 2021-03-10 RX ADMIN — ATORVASTATIN CALCIUM 80 MG: 20 TABLET, FILM COATED ORAL at 09:03

## 2021-03-10 RX ADMIN — OXYCODONE 5 MG: 5 TABLET ORAL at 09:03

## 2021-03-10 RX ADMIN — MUPIROCIN: 20 OINTMENT TOPICAL at 09:03

## 2021-03-10 RX ADMIN — CYANOCOBALAMIN TAB 1000 MCG 1000 MCG: 1000 TAB at 09:03

## 2021-03-10 RX ADMIN — HEPARIN SODIUM 5000 UNITS: 5000 INJECTION INTRAVENOUS; SUBCUTANEOUS at 04:03

## 2021-03-10 RX ADMIN — HEPARIN SODIUM 5000 UNITS: 5000 INJECTION INTRAVENOUS; SUBCUTANEOUS at 09:03

## 2021-03-10 RX ADMIN — AMLODIPINE BESYLATE 5 MG: 5 TABLET ORAL at 09:03

## 2021-03-11 ENCOUNTER — TELEPHONE (OUTPATIENT)
Dept: NEUROLOGY | Facility: HOSPITAL | Age: 71
End: 2021-03-11

## 2021-03-11 LAB
ALBUMIN SERPL BCP-MCNC: 3 G/DL (ref 3.5–5.2)
ALP SERPL-CCNC: 101 U/L (ref 55–135)
ALT SERPL W/O P-5'-P-CCNC: 10 U/L (ref 10–44)
ANION GAP SERPL CALC-SCNC: 13 MMOL/L (ref 8–16)
AST SERPL-CCNC: 11 U/L (ref 10–40)
BACTERIA UR CULT: ABNORMAL
BASOPHILS # BLD AUTO: 0.03 K/UL (ref 0–0.2)
BASOPHILS NFR BLD: 0.6 % (ref 0–1.9)
BILIRUB SERPL-MCNC: 0.5 MG/DL (ref 0.1–1)
BUN SERPL-MCNC: 19 MG/DL (ref 8–23)
CALCIUM SERPL-MCNC: 8.6 MG/DL (ref 8.7–10.5)
CHLORIDE SERPL-SCNC: 107 MMOL/L (ref 95–110)
CO2 SERPL-SCNC: 25 MMOL/L (ref 23–29)
CREAT SERPL-MCNC: 1 MG/DL (ref 0.5–1.4)
DIFFERENTIAL METHOD: ABNORMAL
EOSINOPHIL # BLD AUTO: 0.1 K/UL (ref 0–0.5)
EOSINOPHIL NFR BLD: 2.8 % (ref 0–8)
ERYTHROCYTE [DISTWIDTH] IN BLOOD BY AUTOMATED COUNT: 13.5 % (ref 11.5–14.5)
EST. GFR  (AFRICAN AMERICAN): >60 ML/MIN/1.73 M^2
EST. GFR  (NON AFRICAN AMERICAN): 58 ML/MIN/1.73 M^2
GLUCOSE SERPL-MCNC: 145 MG/DL (ref 70–110)
HCT VFR BLD AUTO: 33.8 % (ref 37–48.5)
HGB BLD-MCNC: 10.4 G/DL (ref 12–16)
IMM GRANULOCYTES # BLD AUTO: 0.01 K/UL (ref 0–0.04)
IMM GRANULOCYTES NFR BLD AUTO: 0.2 % (ref 0–0.5)
LYMPHOCYTES # BLD AUTO: 1.2 K/UL (ref 1–4.8)
LYMPHOCYTES NFR BLD: 24.9 % (ref 18–48)
MCH RBC QN AUTO: 27.8 PG (ref 27–31)
MCHC RBC AUTO-ENTMCNC: 30.8 G/DL (ref 32–36)
MCV RBC AUTO: 90 FL (ref 82–98)
MONOCYTES # BLD AUTO: 0.6 K/UL (ref 0.3–1)
MONOCYTES NFR BLD: 11.7 % (ref 4–15)
NEUTROPHILS # BLD AUTO: 2.8 K/UL (ref 1.8–7.7)
NEUTROPHILS NFR BLD: 59.8 % (ref 38–73)
NRBC BLD-RTO: 0 /100 WBC
PLATELET # BLD AUTO: 182 K/UL (ref 150–350)
PMV BLD AUTO: 11.5 FL (ref 9.2–12.9)
POTASSIUM SERPL-SCNC: 3.5 MMOL/L (ref 3.5–5.1)
PROT SERPL-MCNC: 6.6 G/DL (ref 6–8.4)
RBC # BLD AUTO: 3.74 M/UL (ref 4–5.4)
SODIUM SERPL-SCNC: 145 MMOL/L (ref 136–145)
WBC # BLD AUTO: 4.69 K/UL (ref 3.9–12.7)

## 2021-03-11 PROCEDURE — 80053 COMPREHEN METABOLIC PANEL: CPT | Performed by: NURSE PRACTITIONER

## 2021-03-11 PROCEDURE — 97535 SELF CARE MNGMENT TRAINING: CPT

## 2021-03-11 PROCEDURE — 25000003 PHARM REV CODE 250: Performed by: PHYSICIAN ASSISTANT

## 2021-03-11 PROCEDURE — 92526 ORAL FUNCTION THERAPY: CPT

## 2021-03-11 PROCEDURE — 25000003 PHARM REV CODE 250: Performed by: NURSE PRACTITIONER

## 2021-03-11 PROCEDURE — 63600175 PHARM REV CODE 636 W HCPCS: Performed by: HOSPITALIST

## 2021-03-11 PROCEDURE — 36415 COLL VENOUS BLD VENIPUNCTURE: CPT | Performed by: NURSE PRACTITIONER

## 2021-03-11 PROCEDURE — 99232 SBSQ HOSP IP/OBS MODERATE 35: CPT | Mod: ,,, | Performed by: HOSPITALIST

## 2021-03-11 PROCEDURE — 92507 TX SP LANG VOICE COMM INDIV: CPT

## 2021-03-11 PROCEDURE — 25000003 PHARM REV CODE 250: Performed by: STUDENT IN AN ORGANIZED HEALTH CARE EDUCATION/TRAINING PROGRAM

## 2021-03-11 PROCEDURE — 85025 COMPLETE CBC W/AUTO DIFF WBC: CPT | Performed by: NURSE PRACTITIONER

## 2021-03-11 PROCEDURE — 99232 PR SUBSEQUENT HOSPITAL CARE,LEVL II: ICD-10-PCS | Mod: ,,, | Performed by: HOSPITALIST

## 2021-03-11 PROCEDURE — 25000003 PHARM REV CODE 250: Performed by: HOSPITALIST

## 2021-03-11 PROCEDURE — 63600175 PHARM REV CODE 636 W HCPCS: Performed by: STUDENT IN AN ORGANIZED HEALTH CARE EDUCATION/TRAINING PROGRAM

## 2021-03-11 PROCEDURE — 25000003 PHARM REV CODE 250: Performed by: INTERNAL MEDICINE

## 2021-03-11 PROCEDURE — 11000001 HC ACUTE MED/SURG PRIVATE ROOM

## 2021-03-11 RX ORDER — SIMETHICONE 80 MG
1 TABLET,CHEWABLE ORAL 3 TIMES DAILY PRN
Status: DISCONTINUED | OUTPATIENT
Start: 2021-03-11 | End: 2021-03-17 | Stop reason: HOSPADM

## 2021-03-11 RX ORDER — CARVEDILOL 3.12 MG/1
6.25 TABLET ORAL ONCE
Status: COMPLETED | OUTPATIENT
Start: 2021-03-11 | End: 2021-03-11

## 2021-03-11 RX ORDER — ALUMINUM HYDROXIDE, MAGNESIUM HYDROXIDE, AND SIMETHICONE 2400; 240; 2400 MG/30ML; MG/30ML; MG/30ML
30 SUSPENSION ORAL EVERY 6 HOURS PRN
Status: DISCONTINUED | OUTPATIENT
Start: 2021-03-11 | End: 2021-03-17 | Stop reason: HOSPADM

## 2021-03-11 RX ORDER — HYDROMORPHONE HYDROCHLORIDE 1 MG/ML
1 INJECTION, SOLUTION INTRAMUSCULAR; INTRAVENOUS; SUBCUTANEOUS ONCE
Status: COMPLETED | OUTPATIENT
Start: 2021-03-11 | End: 2021-03-11

## 2021-03-11 RX ORDER — DICYCLOMINE HYDROCHLORIDE 10 MG/1
10 CAPSULE ORAL 4 TIMES DAILY PRN
Status: DISCONTINUED | OUTPATIENT
Start: 2021-03-11 | End: 2021-03-17 | Stop reason: HOSPADM

## 2021-03-11 RX ORDER — CARVEDILOL 3.12 MG/1
6.25 TABLET ORAL 2 TIMES DAILY
Status: DISCONTINUED | OUTPATIENT
Start: 2021-03-11 | End: 2021-03-14

## 2021-03-11 RX ORDER — AMLODIPINE BESYLATE 5 MG/1
10 TABLET ORAL DAILY
Status: DISCONTINUED | OUTPATIENT
Start: 2021-03-12 | End: 2021-03-12

## 2021-03-11 RX ADMIN — HYDROMORPHONE HYDROCHLORIDE 1 MG: 1 INJECTION, SOLUTION INTRAMUSCULAR; INTRAVENOUS; SUBCUTANEOUS at 11:03

## 2021-03-11 RX ADMIN — OXYCODONE HYDROCHLORIDE 15 MG: 10 TABLET ORAL at 07:03

## 2021-03-11 RX ADMIN — OXYCODONE 5 MG: 5 TABLET ORAL at 09:03

## 2021-03-11 RX ADMIN — OXYCODONE HYDROCHLORIDE 15 MG: 10 TABLET ORAL at 02:03

## 2021-03-11 RX ADMIN — SIMETHICONE 80 MG: 80 TABLET, CHEWABLE ORAL at 05:03

## 2021-03-11 RX ADMIN — HEPARIN SODIUM 5000 UNITS: 5000 INJECTION INTRAVENOUS; SUBCUTANEOUS at 02:03

## 2021-03-11 RX ADMIN — HEPARIN SODIUM 5000 UNITS: 5000 INJECTION INTRAVENOUS; SUBCUTANEOUS at 09:03

## 2021-03-11 RX ADMIN — LEVETIRACETAM 500 MG: 500 TABLET ORAL at 09:03

## 2021-03-11 RX ADMIN — MUPIROCIN: 20 OINTMENT TOPICAL at 09:03

## 2021-03-11 RX ADMIN — HEPARIN SODIUM 5000 UNITS: 5000 INJECTION INTRAVENOUS; SUBCUTANEOUS at 05:03

## 2021-03-11 RX ADMIN — ATORVASTATIN CALCIUM 80 MG: 20 TABLET, FILM COATED ORAL at 09:03

## 2021-03-11 RX ADMIN — CARVEDILOL 6.25 MG: 3.12 TABLET, FILM COATED ORAL at 04:03

## 2021-03-11 RX ADMIN — Medication 10 MG: at 11:03

## 2021-03-11 RX ADMIN — LOSARTAN POTASSIUM 100 MG: 50 TABLET, FILM COATED ORAL at 09:03

## 2021-03-11 RX ADMIN — METOPROLOL TARTRATE 50 MG: 50 TABLET, FILM COATED ORAL at 09:03

## 2021-03-11 RX ADMIN — CARVEDILOL 6.25 MG: 3.12 TABLET, FILM COATED ORAL at 09:03

## 2021-03-11 RX ADMIN — CYANOCOBALAMIN TAB 1000 MCG 1000 MCG: 1000 TAB at 09:03

## 2021-03-11 RX ADMIN — AMLODIPINE BESYLATE 5 MG: 5 TABLET ORAL at 09:03

## 2021-03-11 RX ADMIN — OXYCODONE HYDROCHLORIDE 15 MG: 10 TABLET ORAL at 11:03

## 2021-03-11 RX ADMIN — Medication 10 MG: at 05:03

## 2021-03-11 RX ADMIN — OXYCODONE 5 MG: 5 TABLET ORAL at 01:03

## 2021-03-12 LAB
ALBUMIN SERPL BCP-MCNC: 3 G/DL (ref 3.5–5.2)
ALP SERPL-CCNC: 93 U/L (ref 55–135)
ALT SERPL W/O P-5'-P-CCNC: 8 U/L (ref 10–44)
ANION GAP SERPL CALC-SCNC: 11 MMOL/L (ref 8–16)
AST SERPL-CCNC: 8 U/L (ref 10–40)
BASOPHILS # BLD AUTO: 0.02 K/UL (ref 0–0.2)
BASOPHILS NFR BLD: 0.4 % (ref 0–1.9)
BILIRUB SERPL-MCNC: 0.4 MG/DL (ref 0.1–1)
BUN SERPL-MCNC: 21 MG/DL (ref 8–23)
CALCIUM SERPL-MCNC: 8.7 MG/DL (ref 8.7–10.5)
CHLORIDE SERPL-SCNC: 107 MMOL/L (ref 95–110)
CO2 SERPL-SCNC: 22 MMOL/L (ref 23–29)
CREAT SERPL-MCNC: 1.1 MG/DL (ref 0.5–1.4)
DIFFERENTIAL METHOD: ABNORMAL
EOSINOPHIL # BLD AUTO: 0.2 K/UL (ref 0–0.5)
EOSINOPHIL NFR BLD: 3.9 % (ref 0–8)
ERYTHROCYTE [DISTWIDTH] IN BLOOD BY AUTOMATED COUNT: 13.6 % (ref 11.5–14.5)
EST. GFR  (AFRICAN AMERICAN): 59.6 ML/MIN/1.73 M^2
EST. GFR  (NON AFRICAN AMERICAN): 51.7 ML/MIN/1.73 M^2
GLUCOSE SERPL-MCNC: 131 MG/DL (ref 70–110)
HCT VFR BLD AUTO: 32.7 % (ref 37–48.5)
HGB BLD-MCNC: 9.9 G/DL (ref 12–16)
IMM GRANULOCYTES # BLD AUTO: 0.02 K/UL (ref 0–0.04)
IMM GRANULOCYTES NFR BLD AUTO: 0.4 % (ref 0–0.5)
LYMPHOCYTES # BLD AUTO: 1.1 K/UL (ref 1–4.8)
LYMPHOCYTES NFR BLD: 23.1 % (ref 18–48)
MCH RBC QN AUTO: 27.3 PG (ref 27–31)
MCHC RBC AUTO-ENTMCNC: 30.3 G/DL (ref 32–36)
MCV RBC AUTO: 90 FL (ref 82–98)
MONOCYTES # BLD AUTO: 0.4 K/UL (ref 0.3–1)
MONOCYTES NFR BLD: 9.1 % (ref 4–15)
NEUTROPHILS # BLD AUTO: 2.9 K/UL (ref 1.8–7.7)
NEUTROPHILS NFR BLD: 63.1 % (ref 38–73)
NRBC BLD-RTO: 0 /100 WBC
PLATELET # BLD AUTO: 166 K/UL (ref 150–350)
PMV BLD AUTO: 11.2 FL (ref 9.2–12.9)
POTASSIUM SERPL-SCNC: 3.4 MMOL/L (ref 3.5–5.1)
PROT SERPL-MCNC: 6.6 G/DL (ref 6–8.4)
RBC # BLD AUTO: 3.63 M/UL (ref 4–5.4)
SODIUM SERPL-SCNC: 140 MMOL/L (ref 136–145)
WBC # BLD AUTO: 4.64 K/UL (ref 3.9–12.7)

## 2021-03-12 PROCEDURE — 63600175 PHARM REV CODE 636 W HCPCS: Performed by: INTERNAL MEDICINE

## 2021-03-12 PROCEDURE — 97535 SELF CARE MNGMENT TRAINING: CPT

## 2021-03-12 PROCEDURE — 36415 COLL VENOUS BLD VENIPUNCTURE: CPT | Performed by: NURSE PRACTITIONER

## 2021-03-12 PROCEDURE — 25000003 PHARM REV CODE 250: Performed by: HOSPITALIST

## 2021-03-12 PROCEDURE — 99232 PR SUBSEQUENT HOSPITAL CARE,LEVL II: ICD-10-PCS | Mod: 95,,, | Performed by: INTERNAL MEDICINE

## 2021-03-12 PROCEDURE — 97530 THERAPEUTIC ACTIVITIES: CPT

## 2021-03-12 PROCEDURE — 85025 COMPLETE CBC W/AUTO DIFF WBC: CPT | Performed by: NURSE PRACTITIONER

## 2021-03-12 PROCEDURE — 63600175 PHARM REV CODE 636 W HCPCS: Performed by: STUDENT IN AN ORGANIZED HEALTH CARE EDUCATION/TRAINING PROGRAM

## 2021-03-12 PROCEDURE — 11000001 HC ACUTE MED/SURG PRIVATE ROOM

## 2021-03-12 PROCEDURE — 25000003 PHARM REV CODE 250: Performed by: INTERNAL MEDICINE

## 2021-03-12 PROCEDURE — 97116 GAIT TRAINING THERAPY: CPT

## 2021-03-12 PROCEDURE — 92507 TX SP LANG VOICE COMM INDIV: CPT

## 2021-03-12 PROCEDURE — 99232 SBSQ HOSP IP/OBS MODERATE 35: CPT | Mod: 95,,, | Performed by: INTERNAL MEDICINE

## 2021-03-12 PROCEDURE — 25000003 PHARM REV CODE 250: Performed by: NURSE PRACTITIONER

## 2021-03-12 PROCEDURE — 80053 COMPREHEN METABOLIC PANEL: CPT | Performed by: NURSE PRACTITIONER

## 2021-03-12 PROCEDURE — 25000003 PHARM REV CODE 250: Performed by: STUDENT IN AN ORGANIZED HEALTH CARE EDUCATION/TRAINING PROGRAM

## 2021-03-12 RX ORDER — CHLORHEXIDINE GLUCONATE ORAL RINSE 1.2 MG/ML
15 SOLUTION DENTAL 2 TIMES DAILY
Status: DISCONTINUED | OUTPATIENT
Start: 2021-03-12 | End: 2021-03-17 | Stop reason: HOSPADM

## 2021-03-12 RX ORDER — LIDOCAINE HYDROCHLORIDE 20 MG/ML
10 SOLUTION OROPHARYNGEAL EVERY 4 HOURS PRN
Status: DISCONTINUED | OUTPATIENT
Start: 2021-03-12 | End: 2021-03-17 | Stop reason: HOSPADM

## 2021-03-12 RX ORDER — AMLODIPINE BESYLATE 5 MG/1
5 TABLET ORAL DAILY
Status: DISCONTINUED | OUTPATIENT
Start: 2021-03-13 | End: 2021-03-17 | Stop reason: HOSPADM

## 2021-03-12 RX ORDER — POTASSIUM CHLORIDE 20 MEQ/1
20 TABLET, EXTENDED RELEASE ORAL EVERY 4 HOURS
Status: COMPLETED | OUTPATIENT
Start: 2021-03-12 | End: 2021-03-12

## 2021-03-12 RX ORDER — PANTOPRAZOLE SODIUM 40 MG/1
40 TABLET, DELAYED RELEASE ORAL DAILY
Status: DISCONTINUED | OUTPATIENT
Start: 2021-03-12 | End: 2021-03-17 | Stop reason: HOSPADM

## 2021-03-12 RX ADMIN — HYPROMELLOSE 2910 2 DROP: 5 SOLUTION OPHTHALMIC at 03:03

## 2021-03-12 RX ADMIN — OXYCODONE HYDROCHLORIDE 15 MG: 10 TABLET ORAL at 11:03

## 2021-03-12 RX ADMIN — MUPIROCIN: 20 OINTMENT TOPICAL at 10:03

## 2021-03-12 RX ADMIN — PANTOPRAZOLE SODIUM 40 MG: 40 TABLET, DELAYED RELEASE ORAL at 01:03

## 2021-03-12 RX ADMIN — LEVETIRACETAM 500 MG: 500 TABLET ORAL at 09:03

## 2021-03-12 RX ADMIN — LOSARTAN POTASSIUM 100 MG: 50 TABLET, FILM COATED ORAL at 09:03

## 2021-03-12 RX ADMIN — POTASSIUM CHLORIDE 20 MEQ: 1500 TABLET, EXTENDED RELEASE ORAL at 05:03

## 2021-03-12 RX ADMIN — ATORVASTATIN CALCIUM 80 MG: 20 TABLET, FILM COATED ORAL at 08:03

## 2021-03-12 RX ADMIN — HEPARIN SODIUM 5000 UNITS: 5000 INJECTION INTRAVENOUS; SUBCUTANEOUS at 01:03

## 2021-03-12 RX ADMIN — OXYCODONE HYDROCHLORIDE 15 MG: 10 TABLET ORAL at 03:03

## 2021-03-12 RX ADMIN — OXYCODONE HYDROCHLORIDE 15 MG: 10 TABLET ORAL at 07:03

## 2021-03-12 RX ADMIN — CARVEDILOL 6.25 MG: 3.12 TABLET, FILM COATED ORAL at 08:03

## 2021-03-12 RX ADMIN — CARVEDILOL 6.25 MG: 3.12 TABLET, FILM COATED ORAL at 09:03

## 2021-03-12 RX ADMIN — POTASSIUM CHLORIDE 20 MEQ: 1500 TABLET, EXTENDED RELEASE ORAL at 01:03

## 2021-03-12 RX ADMIN — DIPHENHYDRAMINE HYDROCHLORIDE, ZINC ACETATE: 2; .1 CREAM TOPICAL at 01:03

## 2021-03-12 RX ADMIN — CEFTRIAXONE SODIUM 2 G: 2 INJECTION, POWDER, FOR SOLUTION INTRAMUSCULAR; INTRAVENOUS at 01:03

## 2021-03-12 RX ADMIN — OXYCODONE HYDROCHLORIDE 15 MG: 10 TABLET ORAL at 06:03

## 2021-03-12 RX ADMIN — CHLORHEXIDINE GLUCONATE 0.12% ORAL RINSE 15 ML: 1.2 LIQUID ORAL at 06:03

## 2021-03-12 RX ADMIN — LEVETIRACETAM 500 MG: 500 TABLET ORAL at 08:03

## 2021-03-12 RX ADMIN — HEPARIN SODIUM 5000 UNITS: 5000 INJECTION INTRAVENOUS; SUBCUTANEOUS at 08:03

## 2021-03-12 RX ADMIN — AMLODIPINE BESYLATE 10 MG: 5 TABLET ORAL at 09:03

## 2021-03-12 RX ADMIN — CYANOCOBALAMIN TAB 1000 MCG 1000 MCG: 1000 TAB at 09:03

## 2021-03-13 LAB
ALBUMIN SERPL BCP-MCNC: 3 G/DL (ref 3.5–5.2)
ALP SERPL-CCNC: 100 U/L (ref 55–135)
ALT SERPL W/O P-5'-P-CCNC: 6 U/L (ref 10–44)
ANION GAP SERPL CALC-SCNC: 7 MMOL/L (ref 8–16)
AST SERPL-CCNC: 8 U/L (ref 10–40)
BASOPHILS # BLD AUTO: 0.02 K/UL (ref 0–0.2)
BASOPHILS NFR BLD: 0.4 % (ref 0–1.9)
BILIRUB SERPL-MCNC: 0.4 MG/DL (ref 0.1–1)
BUN SERPL-MCNC: 24 MG/DL (ref 8–23)
BURR CELLS BLD QL SMEAR: ABNORMAL
CALCIUM SERPL-MCNC: 9.1 MG/DL (ref 8.7–10.5)
CHLORIDE SERPL-SCNC: 109 MMOL/L (ref 95–110)
CO2 SERPL-SCNC: 27 MMOL/L (ref 23–29)
CREAT SERPL-MCNC: 1.1 MG/DL (ref 0.5–1.4)
DIFFERENTIAL METHOD: ABNORMAL
EOSINOPHIL # BLD AUTO: 0.2 K/UL (ref 0–0.5)
EOSINOPHIL NFR BLD: 3.3 % (ref 0–8)
ERYTHROCYTE [DISTWIDTH] IN BLOOD BY AUTOMATED COUNT: 14 % (ref 11.5–14.5)
EST. GFR  (AFRICAN AMERICAN): 59.6 ML/MIN/1.73 M^2
EST. GFR  (NON AFRICAN AMERICAN): 51.7 ML/MIN/1.73 M^2
GLUCOSE SERPL-MCNC: 161 MG/DL (ref 70–110)
HCT VFR BLD AUTO: 34 % (ref 37–48.5)
HGB BLD-MCNC: 10.4 G/DL (ref 12–16)
IMM GRANULOCYTES # BLD AUTO: 0.02 K/UL (ref 0–0.04)
IMM GRANULOCYTES NFR BLD AUTO: 0.4 % (ref 0–0.5)
LYMPHOCYTES # BLD AUTO: 1.1 K/UL (ref 1–4.8)
LYMPHOCYTES NFR BLD: 23 % (ref 18–48)
MAGNESIUM SERPL-MCNC: 1.5 MG/DL (ref 1.6–2.6)
MCH RBC QN AUTO: 28.4 PG (ref 27–31)
MCHC RBC AUTO-ENTMCNC: 30.6 G/DL (ref 32–36)
MCV RBC AUTO: 93 FL (ref 82–98)
MONOCYTES # BLD AUTO: 0.5 K/UL (ref 0.3–1)
MONOCYTES NFR BLD: 11.7 % (ref 4–15)
NEUTROPHILS # BLD AUTO: 2.8 K/UL (ref 1.8–7.7)
NEUTROPHILS NFR BLD: 61.2 % (ref 38–73)
NRBC BLD-RTO: 0 /100 WBC
OVALOCYTES BLD QL SMEAR: ABNORMAL
PHOSPHATE SERPL-MCNC: 2.7 MG/DL (ref 2.7–4.5)
PLATELET # BLD AUTO: 196 K/UL (ref 150–350)
PLATELET BLD QL SMEAR: ABNORMAL
PMV BLD AUTO: 12.5 FL (ref 9.2–12.9)
POIKILOCYTOSIS BLD QL SMEAR: SLIGHT
POTASSIUM SERPL-SCNC: 4.1 MMOL/L (ref 3.5–5.1)
PROT SERPL-MCNC: 6.7 G/DL (ref 6–8.4)
RBC # BLD AUTO: 3.66 M/UL (ref 4–5.4)
SODIUM SERPL-SCNC: 143 MMOL/L (ref 136–145)
WBC # BLD AUTO: 4.61 K/UL (ref 3.9–12.7)

## 2021-03-13 PROCEDURE — 99232 PR SUBSEQUENT HOSPITAL CARE,LEVL II: ICD-10-PCS | Mod: 95,,, | Performed by: INTERNAL MEDICINE

## 2021-03-13 PROCEDURE — 11000001 HC ACUTE MED/SURG PRIVATE ROOM

## 2021-03-13 PROCEDURE — 25000003 PHARM REV CODE 250: Performed by: INTERNAL MEDICINE

## 2021-03-13 PROCEDURE — 25000003 PHARM REV CODE 250: Performed by: STUDENT IN AN ORGANIZED HEALTH CARE EDUCATION/TRAINING PROGRAM

## 2021-03-13 PROCEDURE — 85025 COMPLETE CBC W/AUTO DIFF WBC: CPT | Performed by: NURSE PRACTITIONER

## 2021-03-13 PROCEDURE — 63600175 PHARM REV CODE 636 W HCPCS: Performed by: INTERNAL MEDICINE

## 2021-03-13 PROCEDURE — 36415 COLL VENOUS BLD VENIPUNCTURE: CPT | Performed by: INTERNAL MEDICINE

## 2021-03-13 PROCEDURE — 80053 COMPREHEN METABOLIC PANEL: CPT | Performed by: INTERNAL MEDICINE

## 2021-03-13 PROCEDURE — 36415 COLL VENOUS BLD VENIPUNCTURE: CPT | Performed by: NURSE PRACTITIONER

## 2021-03-13 PROCEDURE — 99232 SBSQ HOSP IP/OBS MODERATE 35: CPT | Mod: 95,,, | Performed by: INTERNAL MEDICINE

## 2021-03-13 PROCEDURE — 84100 ASSAY OF PHOSPHORUS: CPT | Performed by: INTERNAL MEDICINE

## 2021-03-13 PROCEDURE — 25000003 PHARM REV CODE 250: Performed by: HOSPITALIST

## 2021-03-13 PROCEDURE — 25000003 PHARM REV CODE 250: Performed by: NURSE PRACTITIONER

## 2021-03-13 PROCEDURE — 83735 ASSAY OF MAGNESIUM: CPT | Performed by: INTERNAL MEDICINE

## 2021-03-13 PROCEDURE — 63600175 PHARM REV CODE 636 W HCPCS: Performed by: STUDENT IN AN ORGANIZED HEALTH CARE EDUCATION/TRAINING PROGRAM

## 2021-03-13 RX ORDER — POLYETHYLENE GLYCOL 3350 17 G/17G
17 POWDER, FOR SOLUTION ORAL 2 TIMES DAILY PRN
Status: DISCONTINUED | OUTPATIENT
Start: 2021-03-13 | End: 2021-03-17 | Stop reason: HOSPADM

## 2021-03-13 RX ORDER — ACETAMINOPHEN 325 MG/1
650 TABLET ORAL EVERY 4 HOURS PRN
Status: DISCONTINUED | OUTPATIENT
Start: 2021-03-13 | End: 2021-03-17 | Stop reason: HOSPADM

## 2021-03-13 RX ORDER — MECLIZINE HCL 12.5 MG 12.5 MG/1
25 TABLET ORAL EVERY 6 HOURS
Status: COMPLETED | OUTPATIENT
Start: 2021-03-13 | End: 2021-03-14

## 2021-03-13 RX ADMIN — CYANOCOBALAMIN TAB 1000 MCG 1000 MCG: 1000 TAB at 10:03

## 2021-03-13 RX ADMIN — OXYCODONE HYDROCHLORIDE 15 MG: 10 TABLET ORAL at 10:03

## 2021-03-13 RX ADMIN — MECLIZINE 25 MG: 12.5 TABLET ORAL at 06:03

## 2021-03-13 RX ADMIN — ACETAMINOPHEN 650 MG: 325 TABLET ORAL at 10:03

## 2021-03-13 RX ADMIN — CEFTRIAXONE SODIUM 2 G: 2 INJECTION, POWDER, FOR SOLUTION INTRAMUSCULAR; INTRAVENOUS at 01:03

## 2021-03-13 RX ADMIN — OXYCODONE HYDROCHLORIDE 15 MG: 10 TABLET ORAL at 04:03

## 2021-03-13 RX ADMIN — ATORVASTATIN CALCIUM 80 MG: 20 TABLET, FILM COATED ORAL at 09:03

## 2021-03-13 RX ADMIN — MECLIZINE 25 MG: 12.5 TABLET ORAL at 01:03

## 2021-03-13 RX ADMIN — HYPROMELLOSE 2910 2 DROP: 5 SOLUTION OPHTHALMIC at 06:03

## 2021-03-13 RX ADMIN — OXYCODONE HYDROCHLORIDE 15 MG: 10 TABLET ORAL at 09:03

## 2021-03-13 RX ADMIN — CHLORHEXIDINE GLUCONATE 0.12% ORAL RINSE 15 ML: 1.2 LIQUID ORAL at 09:03

## 2021-03-13 RX ADMIN — HEPARIN SODIUM 5000 UNITS: 5000 INJECTION INTRAVENOUS; SUBCUTANEOUS at 04:03

## 2021-03-13 RX ADMIN — HYPROMELLOSE 2910 2 DROP: 5 SOLUTION OPHTHALMIC at 01:03

## 2021-03-13 RX ADMIN — PANTOPRAZOLE SODIUM 40 MG: 40 TABLET, DELAYED RELEASE ORAL at 10:03

## 2021-03-13 RX ADMIN — MUPIROCIN: 20 OINTMENT TOPICAL at 10:03

## 2021-03-13 RX ADMIN — LEVETIRACETAM 500 MG: 500 TABLET ORAL at 10:03

## 2021-03-13 RX ADMIN — HEPARIN SODIUM 5000 UNITS: 5000 INJECTION INTRAVENOUS; SUBCUTANEOUS at 03:03

## 2021-03-13 RX ADMIN — HEPARIN SODIUM 5000 UNITS: 5000 INJECTION INTRAVENOUS; SUBCUTANEOUS at 09:03

## 2021-03-13 RX ADMIN — DIPHENHYDRAMINE HYDROCHLORIDE, ZINC ACETATE: 2; .1 CREAM TOPICAL at 10:03

## 2021-03-13 RX ADMIN — LOSARTAN POTASSIUM 100 MG: 50 TABLET, FILM COATED ORAL at 10:03

## 2021-03-13 RX ADMIN — CARVEDILOL 6.25 MG: 3.12 TABLET, FILM COATED ORAL at 09:03

## 2021-03-13 RX ADMIN — CARVEDILOL 6.25 MG: 3.12 TABLET, FILM COATED ORAL at 10:03

## 2021-03-13 RX ADMIN — LEVETIRACETAM 500 MG: 500 TABLET ORAL at 09:03

## 2021-03-13 RX ADMIN — MUPIROCIN: 20 OINTMENT TOPICAL at 09:03

## 2021-03-13 RX ADMIN — HYPROMELLOSE 2910 2 DROP: 5 SOLUTION OPHTHALMIC at 09:03

## 2021-03-13 RX ADMIN — OXYCODONE HYDROCHLORIDE 15 MG: 10 TABLET ORAL at 02:03

## 2021-03-13 RX ADMIN — CHLORHEXIDINE GLUCONATE 0.12% ORAL RINSE 15 ML: 1.2 LIQUID ORAL at 10:03

## 2021-03-13 RX ADMIN — AMLODIPINE BESYLATE 5 MG: 5 TABLET ORAL at 10:03

## 2021-03-14 LAB
ALBUMIN SERPL BCP-MCNC: 3 G/DL (ref 3.5–5.2)
ALP SERPL-CCNC: 318 U/L (ref 55–135)
ALT SERPL W/O P-5'-P-CCNC: 36 U/L (ref 10–44)
ANION GAP SERPL CALC-SCNC: 10 MMOL/L (ref 8–16)
AST SERPL-CCNC: 68 U/L (ref 10–40)
BASOPHILS # BLD AUTO: 0.01 K/UL (ref 0–0.2)
BASOPHILS NFR BLD: 0.2 % (ref 0–1.9)
BILIRUB SERPL-MCNC: 1.7 MG/DL (ref 0.1–1)
BUN SERPL-MCNC: 21 MG/DL (ref 8–23)
CALCIUM SERPL-MCNC: 9.2 MG/DL (ref 8.7–10.5)
CHLORIDE SERPL-SCNC: 109 MMOL/L (ref 95–110)
CO2 SERPL-SCNC: 23 MMOL/L (ref 23–29)
CREAT SERPL-MCNC: 1.2 MG/DL (ref 0.5–1.4)
DIFFERENTIAL METHOD: ABNORMAL
EOSINOPHIL # BLD AUTO: 0.1 K/UL (ref 0–0.5)
EOSINOPHIL NFR BLD: 1.6 % (ref 0–8)
ERYTHROCYTE [DISTWIDTH] IN BLOOD BY AUTOMATED COUNT: 13.6 % (ref 11.5–14.5)
EST. GFR  (AFRICAN AMERICAN): 53.7 ML/MIN/1.73 M^2
EST. GFR  (NON AFRICAN AMERICAN): 46.5 ML/MIN/1.73 M^2
GLUCOSE SERPL-MCNC: 120 MG/DL (ref 70–110)
HCT VFR BLD AUTO: 34.7 % (ref 37–48.5)
HGB BLD-MCNC: 10.3 G/DL (ref 12–16)
IMM GRANULOCYTES # BLD AUTO: 0.02 K/UL (ref 0–0.04)
IMM GRANULOCYTES NFR BLD AUTO: 0.4 % (ref 0–0.5)
LYMPHOCYTES # BLD AUTO: 0.6 K/UL (ref 1–4.8)
LYMPHOCYTES NFR BLD: 13.1 % (ref 18–48)
MCH RBC QN AUTO: 28 PG (ref 27–31)
MCHC RBC AUTO-ENTMCNC: 29.7 G/DL (ref 32–36)
MCV RBC AUTO: 94 FL (ref 82–98)
MONOCYTES # BLD AUTO: 0.4 K/UL (ref 0.3–1)
MONOCYTES NFR BLD: 8 % (ref 4–15)
NEUTROPHILS # BLD AUTO: 3.7 K/UL (ref 1.8–7.7)
NEUTROPHILS NFR BLD: 76.7 % (ref 38–73)
NRBC BLD-RTO: 0 /100 WBC
PLATELET # BLD AUTO: 100 K/UL (ref 150–350)
PMV BLD AUTO: 12.7 FL (ref 9.2–12.9)
POTASSIUM SERPL-SCNC: 4.3 MMOL/L (ref 3.5–5.1)
PROT SERPL-MCNC: 7 G/DL (ref 6–8.4)
RBC # BLD AUTO: 3.68 M/UL (ref 4–5.4)
SODIUM SERPL-SCNC: 142 MMOL/L (ref 136–145)
WBC # BLD AUTO: 4.88 K/UL (ref 3.9–12.7)

## 2021-03-14 PROCEDURE — 25000003 PHARM REV CODE 250: Performed by: INTERNAL MEDICINE

## 2021-03-14 PROCEDURE — 25000003 PHARM REV CODE 250: Performed by: HOSPITALIST

## 2021-03-14 PROCEDURE — 85025 COMPLETE CBC W/AUTO DIFF WBC: CPT | Performed by: NURSE PRACTITIONER

## 2021-03-14 PROCEDURE — 80053 COMPREHEN METABOLIC PANEL: CPT | Performed by: NURSE PRACTITIONER

## 2021-03-14 PROCEDURE — 99232 SBSQ HOSP IP/OBS MODERATE 35: CPT | Mod: 95,,, | Performed by: INTERNAL MEDICINE

## 2021-03-14 PROCEDURE — 11000001 HC ACUTE MED/SURG PRIVATE ROOM

## 2021-03-14 PROCEDURE — 36415 COLL VENOUS BLD VENIPUNCTURE: CPT | Performed by: NURSE PRACTITIONER

## 2021-03-14 PROCEDURE — 99232 PR SUBSEQUENT HOSPITAL CARE,LEVL II: ICD-10-PCS | Mod: 95,,, | Performed by: INTERNAL MEDICINE

## 2021-03-14 PROCEDURE — 63600175 PHARM REV CODE 636 W HCPCS: Performed by: INTERNAL MEDICINE

## 2021-03-14 PROCEDURE — 63600175 PHARM REV CODE 636 W HCPCS: Performed by: STUDENT IN AN ORGANIZED HEALTH CARE EDUCATION/TRAINING PROGRAM

## 2021-03-14 PROCEDURE — 25000003 PHARM REV CODE 250: Performed by: STUDENT IN AN ORGANIZED HEALTH CARE EDUCATION/TRAINING PROGRAM

## 2021-03-14 RX ORDER — ATORVASTATIN CALCIUM 20 MG/1
80 TABLET, FILM COATED ORAL NIGHTLY
Status: DISCONTINUED | OUTPATIENT
Start: 2021-03-16 | End: 2021-03-17 | Stop reason: HOSPADM

## 2021-03-14 RX ORDER — LOPERAMIDE HYDROCHLORIDE 2 MG/1
2 CAPSULE ORAL 4 TIMES DAILY PRN
Status: DISCONTINUED | OUTPATIENT
Start: 2021-03-14 | End: 2021-03-14

## 2021-03-14 RX ORDER — LOPERAMIDE HYDROCHLORIDE 2 MG/1
2 CAPSULE ORAL EVERY 4 HOURS PRN
Status: DISCONTINUED | OUTPATIENT
Start: 2021-03-14 | End: 2021-03-17 | Stop reason: HOSPADM

## 2021-03-14 RX ORDER — METOPROLOL TARTRATE 25 MG/1
25 TABLET, FILM COATED ORAL 2 TIMES DAILY
Status: DISCONTINUED | OUTPATIENT
Start: 2021-03-14 | End: 2021-03-15

## 2021-03-14 RX ORDER — MECLIZINE HCL 12.5 MG 12.5 MG/1
25 TABLET ORAL EVERY 6 HOURS
Status: DISCONTINUED | OUTPATIENT
Start: 2021-03-14 | End: 2021-03-14

## 2021-03-14 RX ORDER — LOPERAMIDE HYDROCHLORIDE 2 MG/1
2 CAPSULE ORAL EVERY 4 HOURS
Status: DISCONTINUED | OUTPATIENT
Start: 2021-03-14 | End: 2021-03-14

## 2021-03-14 RX ORDER — MECLIZINE HCL 12.5 MG 12.5 MG/1
25 TABLET ORAL EVERY 8 HOURS
Status: DISCONTINUED | OUTPATIENT
Start: 2021-03-14 | End: 2021-03-17 | Stop reason: HOSPADM

## 2021-03-14 RX ORDER — HYDROCORTISONE 1 %
CREAM (GRAM) TOPICAL 3 TIMES DAILY
Status: DISCONTINUED | OUTPATIENT
Start: 2021-03-14 | End: 2021-03-16

## 2021-03-14 RX ADMIN — MECLIZINE 25 MG: 12.5 TABLET ORAL at 05:03

## 2021-03-14 RX ADMIN — DIPHENHYDRAMINE HYDROCHLORIDE, ZINC ACETATE: 2; .1 CREAM TOPICAL at 10:03

## 2021-03-14 RX ADMIN — LEVETIRACETAM 500 MG: 500 TABLET ORAL at 10:03

## 2021-03-14 RX ADMIN — BENZOCAINE: 100 GEL TOPICAL at 06:03

## 2021-03-14 RX ADMIN — AMLODIPINE BESYLATE 5 MG: 5 TABLET ORAL at 10:03

## 2021-03-14 RX ADMIN — HEPARIN SODIUM 5000 UNITS: 5000 INJECTION INTRAVENOUS; SUBCUTANEOUS at 01:03

## 2021-03-14 RX ADMIN — MECLIZINE 25 MG: 12.5 TABLET ORAL at 12:03

## 2021-03-14 RX ADMIN — CEFTRIAXONE SODIUM 2 G: 2 INJECTION, POWDER, FOR SOLUTION INTRAMUSCULAR; INTRAVENOUS at 02:03

## 2021-03-14 RX ADMIN — MECLIZINE 25 MG: 12.5 TABLET ORAL at 09:03

## 2021-03-14 RX ADMIN — DICYCLOMINE HYDROCHLORIDE 10 MG: 10 CAPSULE ORAL at 01:03

## 2021-03-14 RX ADMIN — DIPHENHYDRAMINE HYDROCHLORIDE, ZINC ACETATE: 2; .1 CREAM TOPICAL at 05:03

## 2021-03-14 RX ADMIN — LOPERAMIDE HYDROCHLORIDE 2 MG: 2 CAPSULE ORAL at 09:03

## 2021-03-14 RX ADMIN — LOPERAMIDE HYDROCHLORIDE 2 MG: 2 CAPSULE ORAL at 06:03

## 2021-03-14 RX ADMIN — OXYCODONE HYDROCHLORIDE 15 MG: 10 TABLET ORAL at 11:03

## 2021-03-14 RX ADMIN — METOPROLOL TARTRATE 25 MG: 25 TABLET, FILM COATED ORAL at 09:03

## 2021-03-14 RX ADMIN — HEPARIN SODIUM 5000 UNITS: 5000 INJECTION INTRAVENOUS; SUBCUTANEOUS at 09:03

## 2021-03-14 RX ADMIN — HEPARIN SODIUM 5000 UNITS: 5000 INJECTION INTRAVENOUS; SUBCUTANEOUS at 05:03

## 2021-03-14 RX ADMIN — LOSARTAN POTASSIUM 100 MG: 50 TABLET, FILM COATED ORAL at 10:03

## 2021-03-14 RX ADMIN — PANTOPRAZOLE SODIUM 40 MG: 40 TABLET, DELAYED RELEASE ORAL at 10:03

## 2021-03-14 RX ADMIN — BENZOCAINE: 100 GEL TOPICAL at 01:03

## 2021-03-14 RX ADMIN — HYPROMELLOSE 2910 2 DROP: 5 SOLUTION OPHTHALMIC at 10:03

## 2021-03-14 RX ADMIN — OXYCODONE HYDROCHLORIDE 15 MG: 10 TABLET ORAL at 06:03

## 2021-03-14 RX ADMIN — HYDROCORTISONE: 10 CREAM TOPICAL at 09:03

## 2021-03-14 RX ADMIN — HYDROCORTISONE: 10 CREAM TOPICAL at 02:03

## 2021-03-14 RX ADMIN — CHLORHEXIDINE GLUCONATE 0.12% ORAL RINSE 15 ML: 1.2 LIQUID ORAL at 09:03

## 2021-03-14 RX ADMIN — HYPROMELLOSE 2910 2 DROP: 5 SOLUTION OPHTHALMIC at 09:03

## 2021-03-14 RX ADMIN — HYPROMELLOSE 2910 2 DROP: 5 SOLUTION OPHTHALMIC at 02:03

## 2021-03-14 RX ADMIN — CHLORHEXIDINE GLUCONATE 0.12% ORAL RINSE 15 ML: 1.2 LIQUID ORAL at 01:03

## 2021-03-14 RX ADMIN — CYANOCOBALAMIN TAB 1000 MCG 1000 MCG: 1000 TAB at 10:03

## 2021-03-14 RX ADMIN — CARVEDILOL 6.25 MG: 3.12 TABLET, FILM COATED ORAL at 10:03

## 2021-03-14 RX ADMIN — DIPHENHYDRAMINE HYDROCHLORIDE, ZINC ACETATE: 2; .1 CREAM TOPICAL at 06:03

## 2021-03-14 RX ADMIN — LEVETIRACETAM 500 MG: 500 TABLET ORAL at 09:03

## 2021-03-14 RX ADMIN — OXYCODONE HYDROCHLORIDE 15 MG: 10 TABLET ORAL at 09:03

## 2021-03-15 LAB
ALBUMIN SERPL BCP-MCNC: 2.7 G/DL (ref 3.5–5.2)
ALP SERPL-CCNC: 218 U/L (ref 55–135)
ALT SERPL W/O P-5'-P-CCNC: 23 U/L (ref 10–44)
ANION GAP SERPL CALC-SCNC: 10 MMOL/L (ref 8–16)
AST SERPL-CCNC: 22 U/L (ref 10–40)
BASOPHILS # BLD AUTO: 0.02 K/UL (ref 0–0.2)
BASOPHILS NFR BLD: 0.4 % (ref 0–1.9)
BILIRUB SERPL-MCNC: 0.4 MG/DL (ref 0.1–1)
BUN SERPL-MCNC: 19 MG/DL (ref 8–23)
CALCIUM SERPL-MCNC: 8.5 MG/DL (ref 8.7–10.5)
CHLORIDE SERPL-SCNC: 110 MMOL/L (ref 95–110)
CO2 SERPL-SCNC: 23 MMOL/L (ref 23–29)
CREAT SERPL-MCNC: 1.2 MG/DL (ref 0.5–1.4)
DIFFERENTIAL METHOD: ABNORMAL
EOSINOPHIL # BLD AUTO: 0.2 K/UL (ref 0–0.5)
EOSINOPHIL NFR BLD: 3.3 % (ref 0–8)
ERYTHROCYTE [DISTWIDTH] IN BLOOD BY AUTOMATED COUNT: 13.7 % (ref 11.5–14.5)
EST. GFR  (AFRICAN AMERICAN): 53.7 ML/MIN/1.73 M^2
EST. GFR  (NON AFRICAN AMERICAN): 46.5 ML/MIN/1.73 M^2
GLUCOSE SERPL-MCNC: 117 MG/DL (ref 70–110)
HCT VFR BLD AUTO: 31.2 % (ref 37–48.5)
HGB BLD-MCNC: 9.4 G/DL (ref 12–16)
IMM GRANULOCYTES # BLD AUTO: 0.01 K/UL (ref 0–0.04)
IMM GRANULOCYTES NFR BLD AUTO: 0.2 % (ref 0–0.5)
LYMPHOCYTES # BLD AUTO: 0.8 K/UL (ref 1–4.8)
LYMPHOCYTES NFR BLD: 16.7 % (ref 18–48)
MCH RBC QN AUTO: 27 PG (ref 27–31)
MCHC RBC AUTO-ENTMCNC: 30.1 G/DL (ref 32–36)
MCV RBC AUTO: 90 FL (ref 82–98)
MONOCYTES # BLD AUTO: 0.6 K/UL (ref 0.3–1)
MONOCYTES NFR BLD: 12.8 % (ref 4–15)
NEUTROPHILS # BLD AUTO: 3.1 K/UL (ref 1.8–7.7)
NEUTROPHILS NFR BLD: 66.6 % (ref 38–73)
NRBC BLD-RTO: 0 /100 WBC
PLATELET # BLD AUTO: 170 K/UL (ref 150–350)
PMV BLD AUTO: 11.7 FL (ref 9.2–12.9)
POTASSIUM SERPL-SCNC: 3.5 MMOL/L (ref 3.5–5.1)
PROT SERPL-MCNC: 6.3 G/DL (ref 6–8.4)
RBC # BLD AUTO: 3.48 M/UL (ref 4–5.4)
SODIUM SERPL-SCNC: 143 MMOL/L (ref 136–145)
WBC # BLD AUTO: 4.6 K/UL (ref 3.9–12.7)

## 2021-03-15 PROCEDURE — 80053 COMPREHEN METABOLIC PANEL: CPT | Performed by: NURSE PRACTITIONER

## 2021-03-15 PROCEDURE — 36415 COLL VENOUS BLD VENIPUNCTURE: CPT | Performed by: NURSE PRACTITIONER

## 2021-03-15 PROCEDURE — 85025 COMPLETE CBC W/AUTO DIFF WBC: CPT | Performed by: NURSE PRACTITIONER

## 2021-03-15 PROCEDURE — 25000003 PHARM REV CODE 250: Performed by: STUDENT IN AN ORGANIZED HEALTH CARE EDUCATION/TRAINING PROGRAM

## 2021-03-15 PROCEDURE — 25000003 PHARM REV CODE 250: Performed by: INTERNAL MEDICINE

## 2021-03-15 PROCEDURE — 25000003 PHARM REV CODE 250: Performed by: HOSPITALIST

## 2021-03-15 PROCEDURE — 99232 PR SUBSEQUENT HOSPITAL CARE,LEVL II: ICD-10-PCS | Mod: 95,,, | Performed by: INTERNAL MEDICINE

## 2021-03-15 PROCEDURE — 63600175 PHARM REV CODE 636 W HCPCS: Performed by: STUDENT IN AN ORGANIZED HEALTH CARE EDUCATION/TRAINING PROGRAM

## 2021-03-15 PROCEDURE — 99232 SBSQ HOSP IP/OBS MODERATE 35: CPT | Mod: 95,,, | Performed by: INTERNAL MEDICINE

## 2021-03-15 PROCEDURE — 11000001 HC ACUTE MED/SURG PRIVATE ROOM

## 2021-03-15 RX ORDER — CHOLECALCIFEROL (VITAMIN D3) 125 MCG
2 CAPSULE ORAL
Status: DISCONTINUED | OUTPATIENT
Start: 2021-03-15 | End: 2021-03-15

## 2021-03-15 RX ORDER — ESCITALOPRAM OXALATE 20 MG/1
20 TABLET ORAL DAILY
Status: ON HOLD | COMMUNITY
Start: 2021-03-11 | End: 2021-04-12

## 2021-03-15 RX ORDER — METOPROLOL TARTRATE 50 MG/1
50 TABLET ORAL 2 TIMES DAILY
Status: DISCONTINUED | OUTPATIENT
Start: 2021-03-15 | End: 2021-03-17 | Stop reason: HOSPADM

## 2021-03-15 RX ORDER — CHOLECALCIFEROL (VITAMIN D3) 125 MCG
2 CAPSULE ORAL
Status: DISCONTINUED | OUTPATIENT
Start: 2021-03-15 | End: 2021-03-17 | Stop reason: HOSPADM

## 2021-03-15 RX ADMIN — BENZOCAINE: 100 GEL TOPICAL at 05:03

## 2021-03-15 RX ADMIN — HEPARIN SODIUM 5000 UNITS: 5000 INJECTION INTRAVENOUS; SUBCUTANEOUS at 02:03

## 2021-03-15 RX ADMIN — METOPROLOL TARTRATE 50 MG: 50 TABLET, FILM COATED ORAL at 09:03

## 2021-03-15 RX ADMIN — AMLODIPINE BESYLATE 5 MG: 5 TABLET ORAL at 10:03

## 2021-03-15 RX ADMIN — MECLIZINE 25 MG: 12.5 TABLET ORAL at 01:03

## 2021-03-15 RX ADMIN — HEPARIN SODIUM 5000 UNITS: 5000 INJECTION INTRAVENOUS; SUBCUTANEOUS at 06:03

## 2021-03-15 RX ADMIN — CYANOCOBALAMIN TAB 1000 MCG 1000 MCG: 1000 TAB at 10:03

## 2021-03-15 RX ADMIN — HYDROCORTISONE: 10 CREAM TOPICAL at 10:03

## 2021-03-15 RX ADMIN — MECLIZINE 25 MG: 12.5 TABLET ORAL at 06:03

## 2021-03-15 RX ADMIN — OXYCODONE HYDROCHLORIDE 15 MG: 10 TABLET ORAL at 05:03

## 2021-03-15 RX ADMIN — HEPARIN SODIUM 5000 UNITS: 5000 INJECTION INTRAVENOUS; SUBCUTANEOUS at 09:03

## 2021-03-15 RX ADMIN — SIMETHICONE 80 MG: 80 TABLET, CHEWABLE ORAL at 01:03

## 2021-03-15 RX ADMIN — HYPROMELLOSE 2910 2 DROP: 5 SOLUTION OPHTHALMIC at 09:03

## 2021-03-15 RX ADMIN — PANTOPRAZOLE SODIUM 40 MG: 40 TABLET, DELAYED RELEASE ORAL at 10:03

## 2021-03-15 RX ADMIN — DIPHENHYDRAMINE HYDROCHLORIDE, ZINC ACETATE: 2; .1 CREAM TOPICAL at 05:03

## 2021-03-15 RX ADMIN — LOSARTAN POTASSIUM 100 MG: 50 TABLET, FILM COATED ORAL at 10:03

## 2021-03-15 RX ADMIN — BENZOCAINE: 100 GEL TOPICAL at 06:03

## 2021-03-15 RX ADMIN — HYDROCORTISONE: 10 CREAM TOPICAL at 09:03

## 2021-03-15 RX ADMIN — LEVETIRACETAM 500 MG: 500 TABLET ORAL at 10:03

## 2021-03-15 RX ADMIN — MECLIZINE 25 MG: 12.5 TABLET ORAL at 09:03

## 2021-03-15 RX ADMIN — METOPROLOL TARTRATE 25 MG: 25 TABLET, FILM COATED ORAL at 10:03

## 2021-03-15 RX ADMIN — HYDROCORTISONE: 10 CREAM TOPICAL at 02:03

## 2021-03-15 RX ADMIN — OXYCODONE HYDROCHLORIDE 15 MG: 10 TABLET ORAL at 09:03

## 2021-03-15 RX ADMIN — OXYCODONE HYDROCHLORIDE 15 MG: 10 TABLET ORAL at 11:03

## 2021-03-16 LAB
ALBUMIN SERPL BCP-MCNC: 3 G/DL (ref 3.5–5.2)
ALP SERPL-CCNC: 193 U/L (ref 55–135)
ALT SERPL W/O P-5'-P-CCNC: 18 U/L (ref 10–44)
ANION GAP SERPL CALC-SCNC: 14 MMOL/L (ref 8–16)
AST SERPL-CCNC: 15 U/L (ref 10–40)
BASOPHILS # BLD AUTO: 0.04 K/UL (ref 0–0.2)
BASOPHILS NFR BLD: 0.8 % (ref 0–1.9)
BILIRUB SERPL-MCNC: 0.3 MG/DL (ref 0.1–1)
BUN SERPL-MCNC: 18 MG/DL (ref 8–23)
CALCIUM SERPL-MCNC: 8.6 MG/DL (ref 8.7–10.5)
CHLORIDE SERPL-SCNC: 109 MMOL/L (ref 95–110)
CO2 SERPL-SCNC: 21 MMOL/L (ref 23–29)
CREAT SERPL-MCNC: 1.1 MG/DL (ref 0.5–1.4)
DIFFERENTIAL METHOD: ABNORMAL
EOSINOPHIL # BLD AUTO: 0.2 K/UL (ref 0–0.5)
EOSINOPHIL NFR BLD: 3.4 % (ref 0–8)
ERYTHROCYTE [DISTWIDTH] IN BLOOD BY AUTOMATED COUNT: 13.5 % (ref 11.5–14.5)
EST. GFR  (AFRICAN AMERICAN): 59.6 ML/MIN/1.73 M^2
EST. GFR  (NON AFRICAN AMERICAN): 51.7 ML/MIN/1.73 M^2
GLUCOSE SERPL-MCNC: 103 MG/DL (ref 70–110)
HCT VFR BLD AUTO: 34 % (ref 37–48.5)
HGB BLD-MCNC: 10.3 G/DL (ref 12–16)
IMM GRANULOCYTES # BLD AUTO: 0.01 K/UL (ref 0–0.04)
IMM GRANULOCYTES NFR BLD AUTO: 0.2 % (ref 0–0.5)
LYMPHOCYTES # BLD AUTO: 1.2 K/UL (ref 1–4.8)
LYMPHOCYTES NFR BLD: 23.3 % (ref 18–48)
MCH RBC QN AUTO: 27.7 PG (ref 27–31)
MCHC RBC AUTO-ENTMCNC: 30.3 G/DL (ref 32–36)
MCV RBC AUTO: 91 FL (ref 82–98)
MONOCYTES # BLD AUTO: 0.6 K/UL (ref 0.3–1)
MONOCYTES NFR BLD: 11 % (ref 4–15)
NEUTROPHILS # BLD AUTO: 3.1 K/UL (ref 1.8–7.7)
NEUTROPHILS NFR BLD: 61.3 % (ref 38–73)
NRBC BLD-RTO: 0 /100 WBC
PLATELET # BLD AUTO: 198 K/UL (ref 150–350)
PMV BLD AUTO: 11.7 FL (ref 9.2–12.9)
POTASSIUM SERPL-SCNC: 3.3 MMOL/L (ref 3.5–5.1)
PROT SERPL-MCNC: 7.1 G/DL (ref 6–8.4)
RBC # BLD AUTO: 3.72 M/UL (ref 4–5.4)
SARS-COV-2 RNA RESP QL NAA+PROBE: NOT DETECTED
SODIUM SERPL-SCNC: 144 MMOL/L (ref 136–145)
WBC # BLD AUTO: 5.02 K/UL (ref 3.9–12.7)

## 2021-03-16 PROCEDURE — 25000003 PHARM REV CODE 250: Performed by: INTERNAL MEDICINE

## 2021-03-16 PROCEDURE — 25000003 PHARM REV CODE 250: Performed by: HOSPITALIST

## 2021-03-16 PROCEDURE — 85025 COMPLETE CBC W/AUTO DIFF WBC: CPT | Performed by: NURSE PRACTITIONER

## 2021-03-16 PROCEDURE — 11000001 HC ACUTE MED/SURG PRIVATE ROOM

## 2021-03-16 PROCEDURE — 63600175 PHARM REV CODE 636 W HCPCS: Performed by: STUDENT IN AN ORGANIZED HEALTH CARE EDUCATION/TRAINING PROGRAM

## 2021-03-16 PROCEDURE — 97530 THERAPEUTIC ACTIVITIES: CPT

## 2021-03-16 PROCEDURE — U0005 INFEC AGEN DETEC AMPLI PROBE: HCPCS | Performed by: INTERNAL MEDICINE

## 2021-03-16 PROCEDURE — 80053 COMPREHEN METABOLIC PANEL: CPT | Performed by: NURSE PRACTITIONER

## 2021-03-16 PROCEDURE — U0003 INFECTIOUS AGENT DETECTION BY NUCLEIC ACID (DNA OR RNA); SEVERE ACUTE RESPIRATORY SYNDROME CORONAVIRUS 2 (SARS-COV-2) (CORONAVIRUS DISEASE [COVID-19]), AMPLIFIED PROBE TECHNIQUE, MAKING USE OF HIGH THROUGHPUT TECHNOLOGIES AS DESCRIBED BY CMS-2020-01-R: HCPCS | Performed by: INTERNAL MEDICINE

## 2021-03-16 PROCEDURE — 25000003 PHARM REV CODE 250: Performed by: STUDENT IN AN ORGANIZED HEALTH CARE EDUCATION/TRAINING PROGRAM

## 2021-03-16 PROCEDURE — 36415 COLL VENOUS BLD VENIPUNCTURE: CPT | Performed by: NURSE PRACTITIONER

## 2021-03-16 PROCEDURE — 97116 GAIT TRAINING THERAPY: CPT

## 2021-03-16 RX ORDER — LANOLIN ALCOHOL/MO/W.PET/CERES
1000 CREAM (GRAM) TOPICAL DAILY
Status: CANCELLED | OUTPATIENT
Start: 2021-03-17

## 2021-03-16 RX ORDER — DICYCLOMINE HYDROCHLORIDE 10 MG/1
10 CAPSULE ORAL 4 TIMES DAILY PRN
Status: CANCELLED | OUTPATIENT
Start: 2021-03-16

## 2021-03-16 RX ORDER — ATORVASTATIN CALCIUM 20 MG/1
40 TABLET, FILM COATED ORAL NIGHTLY
Status: CANCELLED | OUTPATIENT
Start: 2021-03-16

## 2021-03-16 RX ORDER — CHOLECALCIFEROL (VITAMIN D3) 125 MCG
2 CAPSULE ORAL
Status: CANCELLED | OUTPATIENT
Start: 2021-03-16

## 2021-03-16 RX ORDER — HEPARIN SODIUM 5000 [USP'U]/ML
5000 INJECTION, SOLUTION INTRAVENOUS; SUBCUTANEOUS 2 TIMES DAILY
Status: DISCONTINUED | OUTPATIENT
Start: 2021-03-17 | End: 2021-03-17 | Stop reason: HOSPADM

## 2021-03-16 RX ORDER — LIDOCAINE 50 MG/G
OINTMENT TOPICAL 3 TIMES DAILY
Status: CANCELLED | OUTPATIENT
Start: 2021-03-16

## 2021-03-16 RX ORDER — AMLODIPINE BESYLATE 5 MG/1
5 TABLET ORAL DAILY
Status: CANCELLED | OUTPATIENT
Start: 2021-03-17

## 2021-03-16 RX ORDER — HEPARIN SODIUM 5000 [USP'U]/ML
5000 INJECTION, SOLUTION INTRAVENOUS; SUBCUTANEOUS 2 TIMES DAILY
Status: CANCELLED | OUTPATIENT
Start: 2021-03-17

## 2021-03-16 RX ORDER — LOPERAMIDE HYDROCHLORIDE 2 MG/1
2 CAPSULE ORAL EVERY 4 HOURS PRN
Status: CANCELLED | OUTPATIENT
Start: 2021-03-16

## 2021-03-16 RX ORDER — LIDOCAINE 50 MG/G
OINTMENT TOPICAL 3 TIMES DAILY
Status: DISCONTINUED | OUTPATIENT
Start: 2021-03-16 | End: 2021-03-17 | Stop reason: HOSPADM

## 2021-03-16 RX ORDER — SIMETHICONE 80 MG
1 TABLET,CHEWABLE ORAL 3 TIMES DAILY PRN
Status: CANCELLED | OUTPATIENT
Start: 2021-03-16

## 2021-03-16 RX ORDER — LOSARTAN POTASSIUM 50 MG/1
100 TABLET ORAL DAILY
Status: CANCELLED | OUTPATIENT
Start: 2021-03-17

## 2021-03-16 RX ORDER — PANTOPRAZOLE SODIUM 40 MG/1
40 TABLET, DELAYED RELEASE ORAL DAILY
Status: CANCELLED | OUTPATIENT
Start: 2021-03-17

## 2021-03-16 RX ORDER — LIDOCAINE HYDROCHLORIDE 20 MG/ML
10 SOLUTION OROPHARYNGEAL EVERY 4 HOURS PRN
Status: CANCELLED | OUTPATIENT
Start: 2021-03-16

## 2021-03-16 RX ORDER — TALC
6 POWDER (GRAM) TOPICAL NIGHTLY PRN
Status: CANCELLED | OUTPATIENT
Start: 2021-03-16

## 2021-03-16 RX ORDER — CHLORHEXIDINE GLUCONATE ORAL RINSE 1.2 MG/ML
15 SOLUTION DENTAL 2 TIMES DAILY
Status: CANCELLED | OUTPATIENT
Start: 2021-03-16

## 2021-03-16 RX ORDER — CALCIUM CARBONATE 200(500)MG
500 TABLET,CHEWABLE ORAL 2 TIMES DAILY PRN
Status: CANCELLED | OUTPATIENT
Start: 2021-03-16

## 2021-03-16 RX ORDER — ALUMINUM HYDROXIDE, MAGNESIUM HYDROXIDE, AND SIMETHICONE 2400; 240; 2400 MG/30ML; MG/30ML; MG/30ML
30 SUSPENSION ORAL EVERY 6 HOURS PRN
Status: CANCELLED | OUTPATIENT
Start: 2021-03-16

## 2021-03-16 RX ORDER — METOPROLOL TARTRATE 50 MG/1
50 TABLET ORAL 2 TIMES DAILY
Status: CANCELLED | OUTPATIENT
Start: 2021-03-16

## 2021-03-16 RX ORDER — ACETAMINOPHEN 325 MG/1
650 TABLET ORAL EVERY 4 HOURS PRN
Status: CANCELLED | OUTPATIENT
Start: 2021-03-16

## 2021-03-16 RX ORDER — MECLIZINE HCL 12.5 MG 12.5 MG/1
25 TABLET ORAL EVERY 8 HOURS
Status: CANCELLED | OUTPATIENT
Start: 2021-03-16

## 2021-03-16 RX ORDER — POTASSIUM CHLORIDE 750 MG/1
30 CAPSULE, EXTENDED RELEASE ORAL ONCE
Status: COMPLETED | OUTPATIENT
Start: 2021-03-16 | End: 2021-03-16

## 2021-03-16 RX ORDER — HEPARIN SODIUM 5000 [USP'U]/ML
5000 INJECTION, SOLUTION INTRAVENOUS; SUBCUTANEOUS EVERY 12 HOURS
Status: DISCONTINUED | OUTPATIENT
Start: 2021-03-16 | End: 2021-03-16

## 2021-03-16 RX ADMIN — HYDROCORTISONE: 10 CREAM TOPICAL at 09:03

## 2021-03-16 RX ADMIN — LOSARTAN POTASSIUM 100 MG: 50 TABLET, FILM COATED ORAL at 09:03

## 2021-03-16 RX ADMIN — METOPROLOL TARTRATE 50 MG: 50 TABLET, FILM COATED ORAL at 08:03

## 2021-03-16 RX ADMIN — OXYCODONE HYDROCHLORIDE 15 MG: 10 TABLET ORAL at 09:03

## 2021-03-16 RX ADMIN — LOPERAMIDE HYDROCHLORIDE 2 MG: 2 CAPSULE ORAL at 09:03

## 2021-03-16 RX ADMIN — BENZOCAINE: 100 GEL TOPICAL at 06:03

## 2021-03-16 RX ADMIN — CHLORHEXIDINE GLUCONATE 0.12% ORAL RINSE 15 ML: 1.2 LIQUID ORAL at 09:03

## 2021-03-16 RX ADMIN — LIDOCAINE: 50 OINTMENT TOPICAL at 08:03

## 2021-03-16 RX ADMIN — LIDOCAINE: 50 OINTMENT TOPICAL at 12:03

## 2021-03-16 RX ADMIN — OXYCODONE HYDROCHLORIDE 15 MG: 10 TABLET ORAL at 01:03

## 2021-03-16 RX ADMIN — AMLODIPINE BESYLATE 5 MG: 5 TABLET ORAL at 09:03

## 2021-03-16 RX ADMIN — ATORVASTATIN CALCIUM 80 MG: 20 TABLET, FILM COATED ORAL at 08:03

## 2021-03-16 RX ADMIN — PANTOPRAZOLE SODIUM 40 MG: 40 TABLET, DELAYED RELEASE ORAL at 09:03

## 2021-03-16 RX ADMIN — HEPARIN SODIUM 5000 UNITS: 5000 INJECTION INTRAVENOUS; SUBCUTANEOUS at 06:03

## 2021-03-16 RX ADMIN — METOPROLOL TARTRATE 50 MG: 50 TABLET, FILM COATED ORAL at 09:03

## 2021-03-16 RX ADMIN — LIDOCAINE: 50 OINTMENT TOPICAL at 03:03

## 2021-03-16 RX ADMIN — CYANOCOBALAMIN TAB 1000 MCG 1000 MCG: 1000 TAB at 09:03

## 2021-03-16 RX ADMIN — MECLIZINE 25 MG: 12.5 TABLET ORAL at 06:03

## 2021-03-16 RX ADMIN — MECLIZINE 25 MG: 12.5 TABLET ORAL at 03:03

## 2021-03-16 RX ADMIN — MECLIZINE 25 MG: 12.5 TABLET ORAL at 11:03

## 2021-03-16 RX ADMIN — POTASSIUM CHLORIDE 30 MEQ: 10 CAPSULE, COATED, EXTENDED RELEASE ORAL at 12:03

## 2021-03-17 ENCOUNTER — HOSPITAL ENCOUNTER (INPATIENT)
Facility: HOSPITAL | Age: 71
LOS: 9 days | Discharge: HOME HEALTH | DRG: 948 | End: 2021-03-26
Attending: HOSPITALIST | Admitting: PSYCHIATRY & NEUROLOGY
Payer: MEDICARE

## 2021-03-17 VITALS
TEMPERATURE: 99 F | DIASTOLIC BLOOD PRESSURE: 76 MMHG | HEIGHT: 66 IN | BODY MASS INDEX: 29.25 KG/M2 | HEART RATE: 69 BPM | WEIGHT: 182 LBS | RESPIRATION RATE: 14 BRPM | SYSTOLIC BLOOD PRESSURE: 153 MMHG | OXYGEN SATURATION: 97 %

## 2021-03-17 DIAGNOSIS — S06.5X0D POST-TRAUMATIC SUBDURAL HEMATOMA, WITHOUT LOSS OF CONSCIOUSNESS, SUBSEQUENT ENCOUNTER: Primary | ICD-10-CM

## 2021-03-17 DIAGNOSIS — S06.5XAA SUBDURAL HEMATOMA: ICD-10-CM

## 2021-03-17 PROCEDURE — 99306 1ST NF CARE HIGH MDM 50: CPT | Mod: ,,, | Performed by: HOSPITALIST

## 2021-03-17 PROCEDURE — 97535 SELF CARE MNGMENT TRAINING: CPT

## 2021-03-17 PROCEDURE — 99306 PR NURSING FACILITY CARE, INIT, HIGH SEVERITY: ICD-10-PCS | Mod: ,,, | Performed by: HOSPITALIST

## 2021-03-17 PROCEDURE — 11000004 HC SNF PRIVATE

## 2021-03-17 PROCEDURE — 63600175 PHARM REV CODE 636 W HCPCS: Performed by: INTERNAL MEDICINE

## 2021-03-17 PROCEDURE — 25000003 PHARM REV CODE 250: Performed by: INTERNAL MEDICINE

## 2021-03-17 PROCEDURE — 97530 THERAPEUTIC ACTIVITIES: CPT

## 2021-03-17 PROCEDURE — 99239 HOSP IP/OBS DSCHRG MGMT >30: CPT | Mod: 95,,, | Performed by: INTERNAL MEDICINE

## 2021-03-17 PROCEDURE — 99239 PR HOSPITAL DISCHARGE DAY,>30 MIN: ICD-10-PCS | Mod: 95,,, | Performed by: INTERNAL MEDICINE

## 2021-03-17 PROCEDURE — 97161 PT EVAL LOW COMPLEX 20 MIN: CPT

## 2021-03-17 PROCEDURE — 25000003 PHARM REV CODE 250: Performed by: HOSPITALIST

## 2021-03-17 PROCEDURE — 97165 OT EVAL LOW COMPLEX 30 MIN: CPT

## 2021-03-17 PROCEDURE — 97116 GAIT TRAINING THERAPY: CPT

## 2021-03-17 PROCEDURE — 92523 SPEECH SOUND LANG COMPREHEN: CPT

## 2021-03-17 RX ORDER — CHLORHEXIDINE GLUCONATE ORAL RINSE 1.2 MG/ML
15 SOLUTION DENTAL 2 TIMES DAILY
Status: DISCONTINUED | OUTPATIENT
Start: 2021-03-17 | End: 2021-03-26 | Stop reason: HOSPADM

## 2021-03-17 RX ORDER — LOSARTAN POTASSIUM 50 MG/1
100 TABLET ORAL DAILY
Status: DISCONTINUED | OUTPATIENT
Start: 2021-03-17 | End: 2021-03-26 | Stop reason: HOSPADM

## 2021-03-17 RX ORDER — TALC
6 POWDER (GRAM) TOPICAL NIGHTLY PRN
Status: DISCONTINUED | OUTPATIENT
Start: 2021-03-17 | End: 2021-03-26 | Stop reason: HOSPADM

## 2021-03-17 RX ORDER — ACETAMINOPHEN 325 MG/1
650 TABLET ORAL EVERY 4 HOURS PRN
Status: DISCONTINUED | OUTPATIENT
Start: 2021-03-17 | End: 2021-03-26 | Stop reason: HOSPADM

## 2021-03-17 RX ORDER — LIDOCAINE 50 MG/G
OINTMENT TOPICAL 3 TIMES DAILY
Status: DISCONTINUED | OUTPATIENT
Start: 2021-03-17 | End: 2021-03-26 | Stop reason: HOSPADM

## 2021-03-17 RX ORDER — LOPERAMIDE HYDROCHLORIDE 2 MG/1
2 CAPSULE ORAL EVERY 4 HOURS PRN
Status: DISCONTINUED | OUTPATIENT
Start: 2021-03-17 | End: 2021-03-26 | Stop reason: HOSPADM

## 2021-03-17 RX ORDER — PANTOPRAZOLE SODIUM 40 MG/1
40 TABLET, DELAYED RELEASE ORAL DAILY
Status: DISCONTINUED | OUTPATIENT
Start: 2021-03-17 | End: 2021-03-26 | Stop reason: HOSPADM

## 2021-03-17 RX ORDER — LIDOCAINE HYDROCHLORIDE 20 MG/ML
10 SOLUTION OROPHARYNGEAL EVERY 4 HOURS PRN
Status: DISCONTINUED | OUTPATIENT
Start: 2021-03-17 | End: 2021-03-26 | Stop reason: HOSPADM

## 2021-03-17 RX ORDER — LANOLIN ALCOHOL/MO/W.PET/CERES
1000 CREAM (GRAM) TOPICAL DAILY
Status: DISCONTINUED | OUTPATIENT
Start: 2021-03-17 | End: 2021-03-26 | Stop reason: HOSPADM

## 2021-03-17 RX ORDER — CHOLECALCIFEROL (VITAMIN D3) 125 MCG
2 CAPSULE ORAL
Status: DISCONTINUED | OUTPATIENT
Start: 2021-03-17 | End: 2021-03-26 | Stop reason: HOSPADM

## 2021-03-17 RX ORDER — MECLIZINE HCL 12.5 MG 12.5 MG/1
25 TABLET ORAL EVERY 8 HOURS
Status: DISCONTINUED | OUTPATIENT
Start: 2021-03-17 | End: 2021-03-26 | Stop reason: HOSPADM

## 2021-03-17 RX ORDER — ATORVASTATIN CALCIUM 20 MG/1
40 TABLET, FILM COATED ORAL NIGHTLY
Status: DISCONTINUED | OUTPATIENT
Start: 2021-03-17 | End: 2021-03-26 | Stop reason: HOSPADM

## 2021-03-17 RX ORDER — DICYCLOMINE HYDROCHLORIDE 10 MG/1
10 CAPSULE ORAL 4 TIMES DAILY PRN
Status: DISCONTINUED | OUTPATIENT
Start: 2021-03-17 | End: 2021-03-26 | Stop reason: HOSPADM

## 2021-03-17 RX ORDER — METOPROLOL TARTRATE 50 MG/1
50 TABLET ORAL 2 TIMES DAILY
Status: DISCONTINUED | OUTPATIENT
Start: 2021-03-17 | End: 2021-03-26 | Stop reason: HOSPADM

## 2021-03-17 RX ORDER — CALCIUM CARBONATE 200(500)MG
500 TABLET,CHEWABLE ORAL 2 TIMES DAILY PRN
Status: DISCONTINUED | OUTPATIENT
Start: 2021-03-17 | End: 2021-03-26 | Stop reason: HOSPADM

## 2021-03-17 RX ORDER — AMLODIPINE BESYLATE 5 MG/1
5 TABLET ORAL DAILY
Status: DISCONTINUED | OUTPATIENT
Start: 2021-03-17 | End: 2021-03-26 | Stop reason: HOSPADM

## 2021-03-17 RX ORDER — SIMETHICONE 80 MG
1 TABLET,CHEWABLE ORAL 3 TIMES DAILY PRN
Status: DISCONTINUED | OUTPATIENT
Start: 2021-03-17 | End: 2021-03-26 | Stop reason: HOSPADM

## 2021-03-17 RX ORDER — HEPARIN SODIUM 5000 [USP'U]/ML
5000 INJECTION, SOLUTION INTRAVENOUS; SUBCUTANEOUS 2 TIMES DAILY
Status: DISCONTINUED | OUTPATIENT
Start: 2021-03-17 | End: 2021-03-18

## 2021-03-17 RX ORDER — ALUMINUM HYDROXIDE, MAGNESIUM HYDROXIDE, AND SIMETHICONE 2400; 240; 2400 MG/30ML; MG/30ML; MG/30ML
30 SUSPENSION ORAL EVERY 6 HOURS PRN
Status: DISCONTINUED | OUTPATIENT
Start: 2021-03-17 | End: 2021-03-26 | Stop reason: HOSPADM

## 2021-03-17 RX ORDER — LOPERAMIDE HYDROCHLORIDE 2 MG/1
2 CAPSULE ORAL 3 TIMES DAILY
Status: DISCONTINUED | OUTPATIENT
Start: 2021-03-17 | End: 2021-03-18

## 2021-03-17 RX ADMIN — LIDOCAINE HYDROCHLORIDE 10 ML: 20 SOLUTION OROPHARYNGEAL at 09:03

## 2021-03-17 RX ADMIN — LOPERAMIDE HYDROCHLORIDE 2 MG: 2 CAPSULE ORAL at 09:03

## 2021-03-17 RX ADMIN — ATORVASTATIN CALCIUM 40 MG: 20 TABLET, FILM COATED ORAL at 09:03

## 2021-03-17 RX ADMIN — MECLIZINE HYDROCHLORIDE 25 MG: 12.5 TABLET ORAL at 06:03

## 2021-03-17 RX ADMIN — OXYCODONE HYDROCHLORIDE 15 MG: 10 TABLET ORAL at 07:03

## 2021-03-17 RX ADMIN — CYANOCOBALAMIN TAB 1000 MCG 1000 MCG: 1000 TAB at 09:03

## 2021-03-17 RX ADMIN — HEPARIN SODIUM 5000 UNITS: 5000 INJECTION INTRAVENOUS; SUBCUTANEOUS at 09:03

## 2021-03-17 RX ADMIN — METOPROLOL TARTRATE 50 MG: 50 TABLET, FILM COATED ORAL at 09:03

## 2021-03-17 RX ADMIN — LOSARTAN POTASSIUM 100 MG: 50 TABLET, FILM COATED ORAL at 09:03

## 2021-03-17 RX ADMIN — OXYCODONE HYDROCHLORIDE 15 MG: 10 TABLET ORAL at 09:03

## 2021-03-17 RX ADMIN — CHLORHEXIDINE GLUCONATE 0.12% ORAL RINSE 15 ML: 1.2 LIQUID ORAL at 09:03

## 2021-03-17 RX ADMIN — OXYCODONE HYDROCHLORIDE 15 MG: 10 TABLET ORAL at 02:03

## 2021-03-17 RX ADMIN — LIDOCAINE: 50 OINTMENT TOPICAL at 02:03

## 2021-03-17 RX ADMIN — MECLIZINE HYDROCHLORIDE 25 MG: 12.5 TABLET ORAL at 02:03

## 2021-03-17 RX ADMIN — LIDOCAINE: 50 OINTMENT TOPICAL at 09:03

## 2021-03-17 RX ADMIN — MECLIZINE HYDROCHLORIDE 25 MG: 12.5 TABLET ORAL at 09:03

## 2021-03-17 RX ADMIN — PANTOPRAZOLE SODIUM 40 MG: 40 TABLET, DELAYED RELEASE ORAL at 09:03

## 2021-03-17 RX ADMIN — AMLODIPINE BESYLATE 5 MG: 5 TABLET ORAL at 09:03

## 2021-03-18 ENCOUNTER — LAB VISIT (OUTPATIENT)
Dept: LAB | Facility: OTHER | Age: 71
End: 2021-03-18
Payer: MEDICARE

## 2021-03-18 DIAGNOSIS — Z20.822 ENCOUNTER FOR LABORATORY TESTING FOR COVID-19 VIRUS: ICD-10-CM

## 2021-03-18 LAB
ALBUMIN SERPL BCP-MCNC: 3 G/DL (ref 3.5–5.2)
ALP SERPL-CCNC: 146 U/L (ref 55–135)
ALT SERPL W/O P-5'-P-CCNC: 11 U/L (ref 10–44)
ANION GAP SERPL CALC-SCNC: 8 MMOL/L (ref 8–16)
AST SERPL-CCNC: 15 U/L (ref 10–40)
BASOPHILS # BLD AUTO: 0.03 K/UL (ref 0–0.2)
BASOPHILS NFR BLD: 0.6 % (ref 0–1.9)
BILIRUB DIRECT SERPL-MCNC: 0.2 MG/DL (ref 0.1–0.3)
BILIRUB SERPL-MCNC: 0.4 MG/DL (ref 0.1–1)
BUN SERPL-MCNC: 17 MG/DL (ref 8–23)
CALCIUM SERPL-MCNC: 8.8 MG/DL (ref 8.7–10.5)
CHLORIDE SERPL-SCNC: 110 MMOL/L (ref 95–110)
CO2 SERPL-SCNC: 25 MMOL/L (ref 23–29)
CREAT SERPL-MCNC: 1 MG/DL (ref 0.5–1.4)
DIFFERENTIAL METHOD: ABNORMAL
EOSINOPHIL # BLD AUTO: 0.2 K/UL (ref 0–0.5)
EOSINOPHIL NFR BLD: 3.6 % (ref 0–8)
ERYTHROCYTE [DISTWIDTH] IN BLOOD BY AUTOMATED COUNT: 13.3 % (ref 11.5–14.5)
EST. GFR  (AFRICAN AMERICAN): >60 ML/MIN/1.73 M^2
EST. GFR  (NON AFRICAN AMERICAN): 58 ML/MIN/1.73 M^2
GLUCOSE SERPL-MCNC: 104 MG/DL (ref 70–110)
HCT VFR BLD AUTO: 30.6 % (ref 37–48.5)
HGB BLD-MCNC: 9.5 G/DL (ref 12–16)
IMM GRANULOCYTES # BLD AUTO: 0.02 K/UL (ref 0–0.04)
IMM GRANULOCYTES NFR BLD AUTO: 0.4 % (ref 0–0.5)
LYMPHOCYTES # BLD AUTO: 1.4 K/UL (ref 1–4.8)
LYMPHOCYTES NFR BLD: 27.6 % (ref 18–48)
MAGNESIUM SERPL-MCNC: 1.5 MG/DL (ref 1.6–2.6)
MCH RBC QN AUTO: 27.7 PG (ref 27–31)
MCHC RBC AUTO-ENTMCNC: 31 G/DL (ref 32–36)
MCV RBC AUTO: 89 FL (ref 82–98)
MONOCYTES # BLD AUTO: 0.5 K/UL (ref 0.3–1)
MONOCYTES NFR BLD: 10.1 % (ref 4–15)
NEUTROPHILS # BLD AUTO: 2.9 K/UL (ref 1.8–7.7)
NEUTROPHILS NFR BLD: 57.7 % (ref 38–73)
NRBC BLD-RTO: 0 /100 WBC
PHOSPHATE SERPL-MCNC: 3.1 MG/DL (ref 2.7–4.5)
PLATELET # BLD AUTO: 184 K/UL (ref 150–350)
PMV BLD AUTO: 12 FL (ref 9.2–12.9)
POTASSIUM SERPL-SCNC: 3.8 MMOL/L (ref 3.5–5.1)
PROT SERPL-MCNC: 6.5 G/DL (ref 6–8.4)
RBC # BLD AUTO: 3.43 M/UL (ref 4–5.4)
SODIUM SERPL-SCNC: 143 MMOL/L (ref 136–145)
WBC # BLD AUTO: 4.97 K/UL (ref 3.9–12.7)

## 2021-03-18 PROCEDURE — U0003 INFECTIOUS AGENT DETECTION BY NUCLEIC ACID (DNA OR RNA); SEVERE ACUTE RESPIRATORY SYNDROME CORONAVIRUS 2 (SARS-COV-2) (CORONAVIRUS DISEASE [COVID-19]), AMPLIFIED PROBE TECHNIQUE, MAKING USE OF HIGH THROUGHPUT TECHNOLOGIES AS DESCRIBED BY CMS-2020-01-R: HCPCS | Performed by: NURSE PRACTITIONER

## 2021-03-18 PROCEDURE — 63600175 PHARM REV CODE 636 W HCPCS: Performed by: INTERNAL MEDICINE

## 2021-03-18 PROCEDURE — 97110 THERAPEUTIC EXERCISES: CPT | Mod: CO

## 2021-03-18 PROCEDURE — 25000003 PHARM REV CODE 250: Performed by: INTERNAL MEDICINE

## 2021-03-18 PROCEDURE — 80048 BASIC METABOLIC PNL TOTAL CA: CPT | Performed by: INTERNAL MEDICINE

## 2021-03-18 PROCEDURE — 80076 HEPATIC FUNCTION PANEL: CPT | Performed by: INTERNAL MEDICINE

## 2021-03-18 PROCEDURE — 83735 ASSAY OF MAGNESIUM: CPT | Performed by: INTERNAL MEDICINE

## 2021-03-18 PROCEDURE — 97530 THERAPEUTIC ACTIVITIES: CPT | Mod: CQ

## 2021-03-18 PROCEDURE — 97535 SELF CARE MNGMENT TRAINING: CPT | Mod: CO

## 2021-03-18 PROCEDURE — 25000003 PHARM REV CODE 250: Performed by: HOSPITALIST

## 2021-03-18 PROCEDURE — 36415 COLL VENOUS BLD VENIPUNCTURE: CPT | Performed by: INTERNAL MEDICINE

## 2021-03-18 PROCEDURE — 85025 COMPLETE CBC W/AUTO DIFF WBC: CPT | Performed by: INTERNAL MEDICINE

## 2021-03-18 PROCEDURE — 11000004 HC SNF PRIVATE

## 2021-03-18 PROCEDURE — 84100 ASSAY OF PHOSPHORUS: CPT | Performed by: INTERNAL MEDICINE

## 2021-03-18 PROCEDURE — 25000003 PHARM REV CODE 250: Performed by: NURSE PRACTITIONER

## 2021-03-18 PROCEDURE — 97116 GAIT TRAINING THERAPY: CPT | Mod: CQ

## 2021-03-18 RX ORDER — LANOLIN ALCOHOL/MO/W.PET/CERES
400 CREAM (GRAM) TOPICAL 2 TIMES DAILY
Status: COMPLETED | OUTPATIENT
Start: 2021-03-18 | End: 2021-03-20

## 2021-03-18 RX ORDER — BENZONATATE 100 MG/1
100 CAPSULE ORAL 3 TIMES DAILY PRN
Status: DISCONTINUED | OUTPATIENT
Start: 2021-03-18 | End: 2021-03-26 | Stop reason: HOSPADM

## 2021-03-18 RX ORDER — POTASSIUM CHLORIDE 20 MEQ/1
20 TABLET, EXTENDED RELEASE ORAL ONCE
Status: COMPLETED | OUTPATIENT
Start: 2021-03-18 | End: 2021-03-18

## 2021-03-18 RX ORDER — LOPERAMIDE HYDROCHLORIDE 2 MG/1
2 CAPSULE ORAL 3 TIMES DAILY
Status: COMPLETED | OUTPATIENT
Start: 2021-03-18 | End: 2021-03-19

## 2021-03-18 RX ADMIN — LIDOCAINE HYDROCHLORIDE 10 ML: 20 SOLUTION OROPHARYNGEAL at 09:03

## 2021-03-18 RX ADMIN — Medication 400 MG: at 08:03

## 2021-03-18 RX ADMIN — OXYCODONE HYDROCHLORIDE 15 MG: 10 TABLET ORAL at 09:03

## 2021-03-18 RX ADMIN — OXYCODONE HYDROCHLORIDE 15 MG: 10 TABLET ORAL at 08:03

## 2021-03-18 RX ADMIN — LOSARTAN POTASSIUM 100 MG: 50 TABLET, FILM COATED ORAL at 09:03

## 2021-03-18 RX ADMIN — LIDOCAINE: 50 OINTMENT TOPICAL at 09:03

## 2021-03-18 RX ADMIN — CYANOCOBALAMIN TAB 1000 MCG 1000 MCG: 1000 TAB at 09:03

## 2021-03-18 RX ADMIN — ATORVASTATIN CALCIUM 40 MG: 20 TABLET, FILM COATED ORAL at 08:03

## 2021-03-18 RX ADMIN — LIDOCAINE: 50 OINTMENT TOPICAL at 02:03

## 2021-03-18 RX ADMIN — AMLODIPINE BESYLATE 5 MG: 5 TABLET ORAL at 09:03

## 2021-03-18 RX ADMIN — MECLIZINE HYDROCHLORIDE 25 MG: 12.5 TABLET ORAL at 02:03

## 2021-03-18 RX ADMIN — PANTOPRAZOLE SODIUM 40 MG: 40 TABLET, DELAYED RELEASE ORAL at 09:03

## 2021-03-18 RX ADMIN — LOPERAMIDE HYDROCHLORIDE 2 MG: 2 CAPSULE ORAL at 02:03

## 2021-03-18 RX ADMIN — POTASSIUM CHLORIDE 20 MEQ: 1500 TABLET, EXTENDED RELEASE ORAL at 08:03

## 2021-03-18 RX ADMIN — LOPERAMIDE HYDROCHLORIDE 2 MG: 2 CAPSULE ORAL at 08:03

## 2021-03-18 RX ADMIN — CHLORHEXIDINE GLUCONATE 0.12% ORAL RINSE 15 ML: 1.2 LIQUID ORAL at 09:03

## 2021-03-18 RX ADMIN — MECLIZINE HYDROCHLORIDE 25 MG: 12.5 TABLET ORAL at 05:03

## 2021-03-18 RX ADMIN — METOPROLOL TARTRATE 50 MG: 50 TABLET, FILM COATED ORAL at 08:03

## 2021-03-18 RX ADMIN — OXYCODONE HYDROCHLORIDE 15 MG: 10 TABLET ORAL at 02:03

## 2021-03-18 RX ADMIN — LOPERAMIDE HYDROCHLORIDE 2 MG: 2 CAPSULE ORAL at 09:03

## 2021-03-18 RX ADMIN — HEPARIN SODIUM 5000 UNITS: 5000 INJECTION INTRAVENOUS; SUBCUTANEOUS at 09:03

## 2021-03-18 RX ADMIN — MECLIZINE HYDROCHLORIDE 25 MG: 12.5 TABLET ORAL at 09:03

## 2021-03-18 RX ADMIN — METOPROLOL TARTRATE 50 MG: 50 TABLET, FILM COATED ORAL at 09:03

## 2021-03-19 LAB — SARS-COV-2 RNA RESP QL NAA+PROBE: NOT DETECTED

## 2021-03-19 PROCEDURE — 25000003 PHARM REV CODE 250: Performed by: INTERNAL MEDICINE

## 2021-03-19 PROCEDURE — 25000003 PHARM REV CODE 250: Performed by: NURSE PRACTITIONER

## 2021-03-19 PROCEDURE — 97530 THERAPEUTIC ACTIVITIES: CPT | Mod: CO

## 2021-03-19 PROCEDURE — 97130 THER IVNTJ EA ADDL 15 MIN: CPT

## 2021-03-19 PROCEDURE — 97530 THERAPEUTIC ACTIVITIES: CPT | Mod: CQ

## 2021-03-19 PROCEDURE — 11000004 HC SNF PRIVATE

## 2021-03-19 PROCEDURE — 97110 THERAPEUTIC EXERCISES: CPT | Mod: CO

## 2021-03-19 PROCEDURE — 97129 THER IVNTJ 1ST 15 MIN: CPT

## 2021-03-19 PROCEDURE — 97535 SELF CARE MNGMENT TRAINING: CPT

## 2021-03-19 PROCEDURE — 97116 GAIT TRAINING THERAPY: CPT | Mod: CQ

## 2021-03-19 RX ADMIN — OXYCODONE HYDROCHLORIDE 15 MG: 10 TABLET ORAL at 12:03

## 2021-03-19 RX ADMIN — Medication 400 MG: at 09:03

## 2021-03-19 RX ADMIN — OXYCODONE HYDROCHLORIDE 15 MG: 10 TABLET ORAL at 06:03

## 2021-03-19 RX ADMIN — METOPROLOL TARTRATE 50 MG: 50 TABLET, FILM COATED ORAL at 09:03

## 2021-03-19 RX ADMIN — LOPERAMIDE HYDROCHLORIDE 2 MG: 2 CAPSULE ORAL at 09:03

## 2021-03-19 RX ADMIN — MECLIZINE HYDROCHLORIDE 25 MG: 12.5 TABLET ORAL at 03:03

## 2021-03-19 RX ADMIN — MECLIZINE HYDROCHLORIDE 25 MG: 12.5 TABLET ORAL at 09:03

## 2021-03-19 RX ADMIN — OXYCODONE HYDROCHLORIDE 15 MG: 10 TABLET ORAL at 10:03

## 2021-03-19 RX ADMIN — ATORVASTATIN CALCIUM 40 MG: 20 TABLET, FILM COATED ORAL at 09:03

## 2021-03-19 RX ADMIN — PANTOPRAZOLE SODIUM 40 MG: 40 TABLET, DELAYED RELEASE ORAL at 09:03

## 2021-03-19 RX ADMIN — LIDOCAINE: 50 OINTMENT TOPICAL at 03:03

## 2021-03-19 RX ADMIN — MECLIZINE HYDROCHLORIDE 25 MG: 12.5 TABLET ORAL at 05:03

## 2021-03-19 RX ADMIN — OXYCODONE HYDROCHLORIDE 15 MG: 10 TABLET ORAL at 07:03

## 2021-03-19 RX ADMIN — AMLODIPINE BESYLATE 5 MG: 5 TABLET ORAL at 09:03

## 2021-03-19 RX ADMIN — LOPERAMIDE HYDROCHLORIDE 2 MG: 2 CAPSULE ORAL at 03:03

## 2021-03-19 RX ADMIN — LIDOCAINE: 50 OINTMENT TOPICAL at 09:03

## 2021-03-19 RX ADMIN — LOSARTAN POTASSIUM 100 MG: 50 TABLET, FILM COATED ORAL at 09:03

## 2021-03-19 RX ADMIN — CYANOCOBALAMIN TAB 1000 MCG 1000 MCG: 1000 TAB at 09:03

## 2021-03-19 RX ADMIN — CHLORHEXIDINE GLUCONATE 0.12% ORAL RINSE 15 ML: 1.2 LIQUID ORAL at 09:03

## 2021-03-20 PROCEDURE — 97110 THERAPEUTIC EXERCISES: CPT

## 2021-03-20 PROCEDURE — 11000004 HC SNF PRIVATE

## 2021-03-20 PROCEDURE — 97116 GAIT TRAINING THERAPY: CPT

## 2021-03-20 PROCEDURE — 25000003 PHARM REV CODE 250: Performed by: INTERNAL MEDICINE

## 2021-03-20 PROCEDURE — 25000003 PHARM REV CODE 250: Performed by: NURSE PRACTITIONER

## 2021-03-20 RX ADMIN — LIDOCAINE: 50 OINTMENT TOPICAL at 09:03

## 2021-03-20 RX ADMIN — Medication 400 MG: at 09:03

## 2021-03-20 RX ADMIN — METOPROLOL TARTRATE 50 MG: 50 TABLET, FILM COATED ORAL at 09:03

## 2021-03-20 RX ADMIN — OXYCODONE HYDROCHLORIDE 15 MG: 10 TABLET ORAL at 10:03

## 2021-03-20 RX ADMIN — MECLIZINE HYDROCHLORIDE 25 MG: 12.5 TABLET ORAL at 01:03

## 2021-03-20 RX ADMIN — CHLORHEXIDINE GLUCONATE 0.12% ORAL RINSE 15 ML: 1.2 LIQUID ORAL at 09:03

## 2021-03-20 RX ADMIN — ACETAMINOPHEN 650 MG: 325 TABLET ORAL at 10:03

## 2021-03-20 RX ADMIN — LOSARTAN POTASSIUM 100 MG: 50 TABLET, FILM COATED ORAL at 09:03

## 2021-03-20 RX ADMIN — OXYCODONE HYDROCHLORIDE 15 MG: 10 TABLET ORAL at 01:03

## 2021-03-20 RX ADMIN — LIDOCAINE: 50 OINTMENT TOPICAL at 04:03

## 2021-03-20 RX ADMIN — OXYCODONE HYDROCHLORIDE 15 MG: 10 TABLET ORAL at 05:03

## 2021-03-20 RX ADMIN — CYANOCOBALAMIN TAB 1000 MCG 1000 MCG: 1000 TAB at 09:03

## 2021-03-20 RX ADMIN — ATORVASTATIN CALCIUM 40 MG: 20 TABLET, FILM COATED ORAL at 09:03

## 2021-03-20 RX ADMIN — AMLODIPINE BESYLATE 5 MG: 5 TABLET ORAL at 09:03

## 2021-03-20 RX ADMIN — OXYCODONE HYDROCHLORIDE 15 MG: 10 TABLET ORAL at 06:03

## 2021-03-20 RX ADMIN — MECLIZINE HYDROCHLORIDE 25 MG: 12.5 TABLET ORAL at 05:03

## 2021-03-20 RX ADMIN — PANTOPRAZOLE SODIUM 40 MG: 40 TABLET, DELAYED RELEASE ORAL at 09:03

## 2021-03-20 RX ADMIN — DIPHENHYDRAMINE HYDROCHLORIDE, ZINC ACETATE: 2; .1 CREAM TOPICAL at 09:03

## 2021-03-20 RX ADMIN — DIPHENHYDRAMINE HYDROCHLORIDE, ZINC ACETATE: 2; .1 CREAM TOPICAL at 06:03

## 2021-03-20 RX ADMIN — MECLIZINE HYDROCHLORIDE 25 MG: 12.5 TABLET ORAL at 09:03

## 2021-03-20 RX ADMIN — ACETAMINOPHEN 650 MG: 325 TABLET ORAL at 05:03

## 2021-03-21 PROCEDURE — 97110 THERAPEUTIC EXERCISES: CPT | Mod: CO

## 2021-03-21 PROCEDURE — 25000003 PHARM REV CODE 250: Performed by: INTERNAL MEDICINE

## 2021-03-21 PROCEDURE — 11000004 HC SNF PRIVATE

## 2021-03-21 PROCEDURE — 97535 SELF CARE MNGMENT TRAINING: CPT | Mod: CO

## 2021-03-21 RX ADMIN — MECLIZINE HYDROCHLORIDE 25 MG: 12.5 TABLET ORAL at 05:03

## 2021-03-21 RX ADMIN — OXYCODONE HYDROCHLORIDE 15 MG: 10 TABLET ORAL at 08:03

## 2021-03-21 RX ADMIN — LIDOCAINE: 50 OINTMENT TOPICAL at 08:03

## 2021-03-21 RX ADMIN — CHLORHEXIDINE GLUCONATE 0.12% ORAL RINSE 15 ML: 1.2 LIQUID ORAL at 08:03

## 2021-03-21 RX ADMIN — CYANOCOBALAMIN TAB 1000 MCG 1000 MCG: 1000 TAB at 09:03

## 2021-03-21 RX ADMIN — PANTOPRAZOLE SODIUM 40 MG: 40 TABLET, DELAYED RELEASE ORAL at 09:03

## 2021-03-21 RX ADMIN — OXYCODONE HYDROCHLORIDE 15 MG: 10 TABLET ORAL at 05:03

## 2021-03-21 RX ADMIN — OXYCODONE HYDROCHLORIDE 15 MG: 10 TABLET ORAL at 10:03

## 2021-03-21 RX ADMIN — METOPROLOL TARTRATE 50 MG: 50 TABLET, FILM COATED ORAL at 09:03

## 2021-03-21 RX ADMIN — MECLIZINE HYDROCHLORIDE 25 MG: 12.5 TABLET ORAL at 03:03

## 2021-03-21 RX ADMIN — LIDOCAINE HYDROCHLORIDE 10 ML: 20 SOLUTION OROPHARYNGEAL at 08:03

## 2021-03-21 RX ADMIN — ATORVASTATIN CALCIUM 40 MG: 20 TABLET, FILM COATED ORAL at 08:03

## 2021-03-21 RX ADMIN — METOPROLOL TARTRATE 50 MG: 50 TABLET, FILM COATED ORAL at 08:03

## 2021-03-21 RX ADMIN — AMLODIPINE BESYLATE 5 MG: 5 TABLET ORAL at 09:03

## 2021-03-21 RX ADMIN — BENZOCAINE: 100 GEL TOPICAL at 09:03

## 2021-03-21 RX ADMIN — LOSARTAN POTASSIUM 100 MG: 50 TABLET, FILM COATED ORAL at 09:03

## 2021-03-21 RX ADMIN — OXYCODONE HYDROCHLORIDE 15 MG: 10 TABLET ORAL at 04:03

## 2021-03-21 RX ADMIN — CHLORHEXIDINE GLUCONATE 0.12% ORAL RINSE 15 ML: 1.2 LIQUID ORAL at 09:03

## 2021-03-21 RX ADMIN — MECLIZINE HYDROCHLORIDE 25 MG: 12.5 TABLET ORAL at 08:03

## 2021-03-22 LAB
ALBUMIN SERPL BCP-MCNC: 3.1 G/DL (ref 3.5–5.2)
ALP SERPL-CCNC: 125 U/L (ref 55–135)
ALT SERPL W/O P-5'-P-CCNC: 9 U/L (ref 10–44)
ANION GAP SERPL CALC-SCNC: 9 MMOL/L (ref 8–16)
AST SERPL-CCNC: 11 U/L (ref 10–40)
BASOPHILS # BLD AUTO: 0.04 K/UL (ref 0–0.2)
BASOPHILS NFR BLD: 0.8 % (ref 0–1.9)
BILIRUB DIRECT SERPL-MCNC: 0.3 MG/DL (ref 0.1–0.3)
BILIRUB SERPL-MCNC: 0.4 MG/DL (ref 0.1–1)
BUN SERPL-MCNC: 19 MG/DL (ref 8–23)
CALCIUM SERPL-MCNC: 8.5 MG/DL (ref 8.7–10.5)
CHLORIDE SERPL-SCNC: 108 MMOL/L (ref 95–110)
CO2 SERPL-SCNC: 24 MMOL/L (ref 23–29)
CREAT SERPL-MCNC: 1.1 MG/DL (ref 0.5–1.4)
DIFFERENTIAL METHOD: ABNORMAL
EOSINOPHIL # BLD AUTO: 0.2 K/UL (ref 0–0.5)
EOSINOPHIL NFR BLD: 4.1 % (ref 0–8)
ERYTHROCYTE [DISTWIDTH] IN BLOOD BY AUTOMATED COUNT: 13.3 % (ref 11.5–14.5)
EST. GFR  (AFRICAN AMERICAN): 59.6 ML/MIN/1.73 M^2
EST. GFR  (NON AFRICAN AMERICAN): 51.7 ML/MIN/1.73 M^2
GLUCOSE SERPL-MCNC: 115 MG/DL (ref 70–110)
HCT VFR BLD AUTO: 31.2 % (ref 37–48.5)
HGB BLD-MCNC: 9.7 G/DL (ref 12–16)
IMM GRANULOCYTES # BLD AUTO: 0.02 K/UL (ref 0–0.04)
IMM GRANULOCYTES NFR BLD AUTO: 0.4 % (ref 0–0.5)
LYMPHOCYTES # BLD AUTO: 1.4 K/UL (ref 1–4.8)
LYMPHOCYTES NFR BLD: 26.6 % (ref 18–48)
MAGNESIUM SERPL-MCNC: 1.7 MG/DL (ref 1.6–2.6)
MCH RBC QN AUTO: 27.6 PG (ref 27–31)
MCHC RBC AUTO-ENTMCNC: 31.1 G/DL (ref 32–36)
MCV RBC AUTO: 89 FL (ref 82–98)
MONOCYTES # BLD AUTO: 0.5 K/UL (ref 0.3–1)
MONOCYTES NFR BLD: 10.3 % (ref 4–15)
NEUTROPHILS # BLD AUTO: 2.9 K/UL (ref 1.8–7.7)
NEUTROPHILS NFR BLD: 57.8 % (ref 38–73)
NRBC BLD-RTO: 0 /100 WBC
PHOSPHATE SERPL-MCNC: 3.2 MG/DL (ref 2.7–4.5)
PLATELET # BLD AUTO: 222 K/UL (ref 150–350)
PMV BLD AUTO: 11.6 FL (ref 9.2–12.9)
POTASSIUM SERPL-SCNC: 3.7 MMOL/L (ref 3.5–5.1)
PROT SERPL-MCNC: 6.9 G/DL (ref 6–8.4)
RBC # BLD AUTO: 3.52 M/UL (ref 4–5.4)
SODIUM SERPL-SCNC: 141 MMOL/L (ref 136–145)
WBC # BLD AUTO: 5.07 K/UL (ref 3.9–12.7)

## 2021-03-22 PROCEDURE — 97110 THERAPEUTIC EXERCISES: CPT | Mod: CQ

## 2021-03-22 PROCEDURE — 80076 HEPATIC FUNCTION PANEL: CPT | Performed by: INTERNAL MEDICINE

## 2021-03-22 PROCEDURE — 25000003 PHARM REV CODE 250: Performed by: NURSE PRACTITIONER

## 2021-03-22 PROCEDURE — 97530 THERAPEUTIC ACTIVITIES: CPT | Mod: CQ

## 2021-03-22 PROCEDURE — 97130 THER IVNTJ EA ADDL 15 MIN: CPT

## 2021-03-22 PROCEDURE — 97535 SELF CARE MNGMENT TRAINING: CPT

## 2021-03-22 PROCEDURE — 97129 THER IVNTJ 1ST 15 MIN: CPT

## 2021-03-22 PROCEDURE — 83735 ASSAY OF MAGNESIUM: CPT | Performed by: INTERNAL MEDICINE

## 2021-03-22 PROCEDURE — 36415 COLL VENOUS BLD VENIPUNCTURE: CPT | Performed by: INTERNAL MEDICINE

## 2021-03-22 PROCEDURE — 84100 ASSAY OF PHOSPHORUS: CPT | Performed by: INTERNAL MEDICINE

## 2021-03-22 PROCEDURE — 11000004 HC SNF PRIVATE

## 2021-03-22 PROCEDURE — 97116 GAIT TRAINING THERAPY: CPT | Mod: CQ

## 2021-03-22 PROCEDURE — 85025 COMPLETE CBC W/AUTO DIFF WBC: CPT | Performed by: INTERNAL MEDICINE

## 2021-03-22 PROCEDURE — 80048 BASIC METABOLIC PNL TOTAL CA: CPT | Performed by: INTERNAL MEDICINE

## 2021-03-22 PROCEDURE — 25000003 PHARM REV CODE 250: Performed by: INTERNAL MEDICINE

## 2021-03-22 RX ORDER — LANOLIN ALCOHOL/MO/W.PET/CERES
400 CREAM (GRAM) TOPICAL 2 TIMES DAILY
Status: COMPLETED | OUTPATIENT
Start: 2021-03-22 | End: 2021-03-24

## 2021-03-22 RX ORDER — LOPERAMIDE HYDROCHLORIDE 2 MG/1
2 CAPSULE ORAL 4 TIMES DAILY
Status: COMPLETED | OUTPATIENT
Start: 2021-03-22 | End: 2021-03-23

## 2021-03-22 RX ADMIN — DIPHENHYDRAMINE HYDROCHLORIDE, ZINC ACETATE: 2; .1 CREAM TOPICAL at 09:03

## 2021-03-22 RX ADMIN — MECLIZINE HYDROCHLORIDE 25 MG: 12.5 TABLET ORAL at 09:03

## 2021-03-22 RX ADMIN — LIDOCAINE: 50 OINTMENT TOPICAL at 09:03

## 2021-03-22 RX ADMIN — LOSARTAN POTASSIUM 100 MG: 50 TABLET, FILM COATED ORAL at 08:03

## 2021-03-22 RX ADMIN — LIDOCAINE HYDROCHLORIDE 10 ML: 20 SOLUTION OROPHARYNGEAL at 08:03

## 2021-03-22 RX ADMIN — ACETAMINOPHEN 650 MG: 325 TABLET ORAL at 08:03

## 2021-03-22 RX ADMIN — LOPERAMIDE HYDROCHLORIDE 2 MG: 2 CAPSULE ORAL at 08:03

## 2021-03-22 RX ADMIN — CYANOCOBALAMIN TAB 1000 MCG 1000 MCG: 1000 TAB at 08:03

## 2021-03-22 RX ADMIN — PANTOPRAZOLE SODIUM 40 MG: 40 TABLET, DELAYED RELEASE ORAL at 08:03

## 2021-03-22 RX ADMIN — LIDOCAINE: 50 OINTMENT TOPICAL at 08:03

## 2021-03-22 RX ADMIN — METOPROLOL TARTRATE 50 MG: 50 TABLET, FILM COATED ORAL at 08:03

## 2021-03-22 RX ADMIN — AMLODIPINE BESYLATE 5 MG: 5 TABLET ORAL at 08:03

## 2021-03-22 RX ADMIN — MAGNESIUM OXIDE 400 MG (241.3 MG MAGNESIUM) TABLET 400 MG: TABLET at 09:03

## 2021-03-22 RX ADMIN — MECLIZINE HYDROCHLORIDE 25 MG: 12.5 TABLET ORAL at 05:03

## 2021-03-22 RX ADMIN — MECLIZINE HYDROCHLORIDE 25 MG: 12.5 TABLET ORAL at 02:03

## 2021-03-22 RX ADMIN — OXYCODONE HYDROCHLORIDE 15 MG: 10 TABLET ORAL at 09:03

## 2021-03-22 RX ADMIN — CHLORHEXIDINE GLUCONATE 0.12% ORAL RINSE 15 ML: 1.2 LIQUID ORAL at 08:03

## 2021-03-22 RX ADMIN — METOPROLOL TARTRATE 50 MG: 50 TABLET, FILM COATED ORAL at 09:03

## 2021-03-22 RX ADMIN — ATORVASTATIN CALCIUM 40 MG: 20 TABLET, FILM COATED ORAL at 09:03

## 2021-03-22 RX ADMIN — DIPHENHYDRAMINE HYDROCHLORIDE, ZINC ACETATE: 2; .1 CREAM TOPICAL at 08:03

## 2021-03-22 RX ADMIN — CHLORHEXIDINE GLUCONATE 0.12% ORAL RINSE 15 ML: 1.2 LIQUID ORAL at 09:03

## 2021-03-22 RX ADMIN — LOPERAMIDE HYDROCHLORIDE 2 MG: 2 CAPSULE ORAL at 05:03

## 2021-03-22 RX ADMIN — LOPERAMIDE HYDROCHLORIDE 2 MG: 2 CAPSULE ORAL at 09:03

## 2021-03-22 RX ADMIN — OXYCODONE HYDROCHLORIDE 15 MG: 10 TABLET ORAL at 08:03

## 2021-03-22 RX ADMIN — OXYCODONE HYDROCHLORIDE 15 MG: 10 TABLET ORAL at 02:03

## 2021-03-23 PROCEDURE — 97110 THERAPEUTIC EXERCISES: CPT

## 2021-03-23 PROCEDURE — 11000004 HC SNF PRIVATE

## 2021-03-23 PROCEDURE — 25000003 PHARM REV CODE 250: Performed by: INTERNAL MEDICINE

## 2021-03-23 PROCEDURE — 97535 SELF CARE MNGMENT TRAINING: CPT

## 2021-03-23 PROCEDURE — 97110 THERAPEUTIC EXERCISES: CPT | Mod: CQ

## 2021-03-23 PROCEDURE — 25000003 PHARM REV CODE 250: Performed by: NURSE PRACTITIONER

## 2021-03-23 PROCEDURE — 97116 GAIT TRAINING THERAPY: CPT | Mod: CQ

## 2021-03-23 PROCEDURE — 97530 THERAPEUTIC ACTIVITIES: CPT | Mod: CQ

## 2021-03-23 RX ORDER — CEPHALEXIN 500 MG/1
500 CAPSULE ORAL EVERY 12 HOURS
Status: DISCONTINUED | OUTPATIENT
Start: 2021-03-23 | End: 2021-03-26 | Stop reason: HOSPADM

## 2021-03-23 RX ORDER — LOPERAMIDE HYDROCHLORIDE 2 MG/1
2 CAPSULE ORAL 4 TIMES DAILY
Status: COMPLETED | OUTPATIENT
Start: 2021-03-23 | End: 2021-03-24

## 2021-03-23 RX ADMIN — MECLIZINE HYDROCHLORIDE 25 MG: 12.5 TABLET ORAL at 05:03

## 2021-03-23 RX ADMIN — LIDOCAINE: 50 OINTMENT TOPICAL at 08:03

## 2021-03-23 RX ADMIN — LIDOCAINE: 50 OINTMENT TOPICAL at 09:03

## 2021-03-23 RX ADMIN — CYANOCOBALAMIN TAB 1000 MCG 1000 MCG: 1000 TAB at 09:03

## 2021-03-23 RX ADMIN — LIDOCAINE: 50 OINTMENT TOPICAL at 02:03

## 2021-03-23 RX ADMIN — OXYCODONE HYDROCHLORIDE 15 MG: 10 TABLET ORAL at 05:03

## 2021-03-23 RX ADMIN — OXYCODONE HYDROCHLORIDE 15 MG: 10 TABLET ORAL at 01:03

## 2021-03-23 RX ADMIN — LOPERAMIDE HYDROCHLORIDE 2 MG: 2 CAPSULE ORAL at 04:03

## 2021-03-23 RX ADMIN — ATORVASTATIN CALCIUM 40 MG: 20 TABLET, FILM COATED ORAL at 08:03

## 2021-03-23 RX ADMIN — MAGNESIUM OXIDE 400 MG (241.3 MG MAGNESIUM) TABLET 400 MG: TABLET at 09:03

## 2021-03-23 RX ADMIN — METOPROLOL TARTRATE 50 MG: 50 TABLET, FILM COATED ORAL at 08:03

## 2021-03-23 RX ADMIN — CEPHALEXIN 500 MG: 500 CAPSULE ORAL at 08:03

## 2021-03-23 RX ADMIN — OXYCODONE HYDROCHLORIDE 15 MG: 10 TABLET ORAL at 08:03

## 2021-03-23 RX ADMIN — DIPHENHYDRAMINE HYDROCHLORIDE, ZINC ACETATE: 2; .1 CREAM TOPICAL at 09:03

## 2021-03-23 RX ADMIN — LOPERAMIDE HYDROCHLORIDE 2 MG: 2 CAPSULE ORAL at 01:03

## 2021-03-23 RX ADMIN — CHLORHEXIDINE GLUCONATE 0.12% ORAL RINSE 15 ML: 1.2 LIQUID ORAL at 09:03

## 2021-03-23 RX ADMIN — OXYCODONE HYDROCHLORIDE 15 MG: 10 TABLET ORAL at 11:03

## 2021-03-23 RX ADMIN — CEPHALEXIN 500 MG: 500 CAPSULE ORAL at 12:03

## 2021-03-23 RX ADMIN — LOPERAMIDE HYDROCHLORIDE 2 MG: 2 CAPSULE ORAL at 09:03

## 2021-03-23 RX ADMIN — MECLIZINE HYDROCHLORIDE 25 MG: 12.5 TABLET ORAL at 02:03

## 2021-03-23 RX ADMIN — LOPERAMIDE HYDROCHLORIDE 2 MG: 2 CAPSULE ORAL at 08:03

## 2021-03-23 RX ADMIN — MECLIZINE HYDROCHLORIDE 25 MG: 12.5 TABLET ORAL at 09:03

## 2021-03-23 RX ADMIN — MAGNESIUM OXIDE 400 MG (241.3 MG MAGNESIUM) TABLET 400 MG: TABLET at 08:03

## 2021-03-23 RX ADMIN — OXYCODONE HYDROCHLORIDE 15 MG: 10 TABLET ORAL at 04:03

## 2021-03-23 RX ADMIN — PANTOPRAZOLE SODIUM 40 MG: 40 TABLET, DELAYED RELEASE ORAL at 09:03

## 2021-03-24 LAB
BACTERIA #/AREA URNS AUTO: ABNORMAL /HPF
BILIRUB UR QL STRIP: NEGATIVE
CLARITY UR REFRACT.AUTO: ABNORMAL
COLOR UR AUTO: ABNORMAL
GLUCOSE UR QL STRIP: NEGATIVE
HGB UR QL STRIP: ABNORMAL
KETONES UR QL STRIP: NEGATIVE
LEUKOCYTE ESTERASE UR QL STRIP: ABNORMAL
MICROSCOPIC COMMENT: ABNORMAL
NITRITE UR QL STRIP: NEGATIVE
PH UR STRIP: 5 [PH] (ref 5–8)
PROT UR QL STRIP: NEGATIVE
RBC #/AREA URNS AUTO: 4 /HPF (ref 0–4)
SP GR UR STRIP: 1.02 (ref 1–1.03)
SQUAMOUS #/AREA URNS AUTO: 1 /HPF
URN SPEC COLLECT METH UR: ABNORMAL
WBC #/AREA URNS AUTO: 20 /HPF (ref 0–5)

## 2021-03-24 PROCEDURE — 97530 THERAPEUTIC ACTIVITIES: CPT | Mod: CQ

## 2021-03-24 PROCEDURE — 81001 URINALYSIS AUTO W/SCOPE: CPT | Performed by: NURSE PRACTITIONER

## 2021-03-24 PROCEDURE — 25000003 PHARM REV CODE 250: Performed by: NURSE PRACTITIONER

## 2021-03-24 PROCEDURE — 97129 THER IVNTJ 1ST 15 MIN: CPT

## 2021-03-24 PROCEDURE — 97110 THERAPEUTIC EXERCISES: CPT | Mod: CO

## 2021-03-24 PROCEDURE — 87086 URINE CULTURE/COLONY COUNT: CPT | Performed by: NURSE PRACTITIONER

## 2021-03-24 PROCEDURE — 97535 SELF CARE MNGMENT TRAINING: CPT

## 2021-03-24 PROCEDURE — 25000003 PHARM REV CODE 250: Performed by: INTERNAL MEDICINE

## 2021-03-24 PROCEDURE — 97110 THERAPEUTIC EXERCISES: CPT | Mod: CQ

## 2021-03-24 PROCEDURE — 97116 GAIT TRAINING THERAPY: CPT | Mod: CQ

## 2021-03-24 PROCEDURE — 11000004 HC SNF PRIVATE

## 2021-03-24 RX ORDER — MECLIZINE HYDROCHLORIDE 25 MG/1
25 TABLET ORAL 3 TIMES DAILY PRN
Qty: 30 TABLET | Refills: 0 | Status: SHIPPED | OUTPATIENT
Start: 2021-03-24 | End: 2021-04-14 | Stop reason: SDUPTHER

## 2021-03-24 RX ORDER — AMLODIPINE BESYLATE 5 MG/1
5 TABLET ORAL DAILY
Qty: 30 TABLET | Refills: 11 | Status: SHIPPED | OUTPATIENT
Start: 2021-03-25 | End: 2021-06-15 | Stop reason: SDUPTHER

## 2021-03-24 RX ORDER — ATORVASTATIN CALCIUM 40 MG/1
40 TABLET, FILM COATED ORAL NIGHTLY
Qty: 90 TABLET | Refills: 3 | Status: SHIPPED | OUTPATIENT
Start: 2021-03-24 | End: 2022-03-08 | Stop reason: SDUPTHER

## 2021-03-24 RX ADMIN — METOPROLOL TARTRATE 50 MG: 50 TABLET, FILM COATED ORAL at 09:03

## 2021-03-24 RX ADMIN — MELATONIN TAB 3 MG 6 MG: 3 TAB at 08:03

## 2021-03-24 RX ADMIN — AMLODIPINE BESYLATE 5 MG: 5 TABLET ORAL at 09:03

## 2021-03-24 RX ADMIN — LOPERAMIDE HYDROCHLORIDE 2 MG: 2 CAPSULE ORAL at 08:03

## 2021-03-24 RX ADMIN — LOSARTAN POTASSIUM 100 MG: 50 TABLET, FILM COATED ORAL at 09:03

## 2021-03-24 RX ADMIN — METOPROLOL TARTRATE 50 MG: 50 TABLET, FILM COATED ORAL at 08:03

## 2021-03-24 RX ADMIN — CHLORHEXIDINE GLUCONATE 0.12% ORAL RINSE 15 ML: 1.2 LIQUID ORAL at 08:03

## 2021-03-24 RX ADMIN — CEPHALEXIN 500 MG: 500 CAPSULE ORAL at 08:03

## 2021-03-24 RX ADMIN — DICYCLOMINE HYDROCHLORIDE 10 MG: 10 CAPSULE ORAL at 05:03

## 2021-03-24 RX ADMIN — LOPERAMIDE HYDROCHLORIDE 2 MG: 2 CAPSULE ORAL at 09:03

## 2021-03-24 RX ADMIN — LIDOCAINE: 50 OINTMENT TOPICAL at 10:03

## 2021-03-24 RX ADMIN — LOPERAMIDE HYDROCHLORIDE 2 MG: 2 CAPSULE ORAL at 12:03

## 2021-03-24 RX ADMIN — CEPHALEXIN 500 MG: 500 CAPSULE ORAL at 09:03

## 2021-03-24 RX ADMIN — MECLIZINE HYDROCHLORIDE 25 MG: 12.5 TABLET ORAL at 08:03

## 2021-03-24 RX ADMIN — MECLIZINE HYDROCHLORIDE 25 MG: 12.5 TABLET ORAL at 10:03

## 2021-03-24 RX ADMIN — LOPERAMIDE HYDROCHLORIDE 2 MG: 2 CAPSULE ORAL at 05:03

## 2021-03-24 RX ADMIN — MAGNESIUM OXIDE 400 MG (241.3 MG MAGNESIUM) TABLET 400 MG: TABLET at 09:03

## 2021-03-24 RX ADMIN — CHLORHEXIDINE GLUCONATE 0.12% ORAL RINSE 15 ML: 1.2 LIQUID ORAL at 10:03

## 2021-03-24 RX ADMIN — ATORVASTATIN CALCIUM 40 MG: 20 TABLET, FILM COATED ORAL at 08:03

## 2021-03-24 RX ADMIN — PANTOPRAZOLE SODIUM 40 MG: 40 TABLET, DELAYED RELEASE ORAL at 09:03

## 2021-03-24 RX ADMIN — DIPHENHYDRAMINE HYDROCHLORIDE, ZINC ACETATE: 2; .1 CREAM TOPICAL at 10:03

## 2021-03-24 RX ADMIN — MECLIZINE HYDROCHLORIDE 25 MG: 12.5 TABLET ORAL at 05:03

## 2021-03-24 RX ADMIN — LIDOCAINE: 50 OINTMENT TOPICAL at 08:03

## 2021-03-24 RX ADMIN — OXYCODONE HYDROCHLORIDE 15 MG: 10 TABLET ORAL at 08:03

## 2021-03-24 RX ADMIN — SIMETHICONE 80 MG: 80 TABLET, CHEWABLE ORAL at 05:03

## 2021-03-24 RX ADMIN — OXYCODONE HYDROCHLORIDE 15 MG: 10 TABLET ORAL at 05:03

## 2021-03-24 RX ADMIN — OXYCODONE HYDROCHLORIDE 15 MG: 10 TABLET ORAL at 12:03

## 2021-03-24 RX ADMIN — CYANOCOBALAMIN TAB 1000 MCG 1000 MCG: 1000 TAB at 09:03

## 2021-03-25 ENCOUNTER — LAB VISIT (OUTPATIENT)
Dept: LAB | Facility: OTHER | Age: 71
End: 2021-03-25
Payer: MEDICARE

## 2021-03-25 DIAGNOSIS — Z20.822 ENCOUNTER FOR LABORATORY TESTING FOR COVID-19 VIRUS: ICD-10-CM

## 2021-03-25 LAB
ALBUMIN SERPL BCP-MCNC: 3.2 G/DL (ref 3.5–5.2)
ALP SERPL-CCNC: 119 U/L (ref 55–135)
ALT SERPL W/O P-5'-P-CCNC: 9 U/L (ref 10–44)
ANION GAP SERPL CALC-SCNC: 8 MMOL/L (ref 8–16)
AST SERPL-CCNC: 15 U/L (ref 10–40)
BACTERIA UR CULT: NO GROWTH
BASOPHILS # BLD AUTO: 0.03 K/UL (ref 0–0.2)
BASOPHILS NFR BLD: 0.7 % (ref 0–1.9)
BILIRUB DIRECT SERPL-MCNC: 0.2 MG/DL (ref 0.1–0.3)
BILIRUB SERPL-MCNC: 0.4 MG/DL (ref 0.1–1)
BUN SERPL-MCNC: 21 MG/DL (ref 8–23)
CALCIUM SERPL-MCNC: 8.6 MG/DL (ref 8.7–10.5)
CHLORIDE SERPL-SCNC: 108 MMOL/L (ref 95–110)
CO2 SERPL-SCNC: 24 MMOL/L (ref 23–29)
CREAT SERPL-MCNC: 1.2 MG/DL (ref 0.5–1.4)
DIFFERENTIAL METHOD: ABNORMAL
EOSINOPHIL # BLD AUTO: 0.2 K/UL (ref 0–0.5)
EOSINOPHIL NFR BLD: 4.4 % (ref 0–8)
ERYTHROCYTE [DISTWIDTH] IN BLOOD BY AUTOMATED COUNT: 13.4 % (ref 11.5–14.5)
EST. GFR  (AFRICAN AMERICAN): 53.7 ML/MIN/1.73 M^2
EST. GFR  (NON AFRICAN AMERICAN): 46.5 ML/MIN/1.73 M^2
GLUCOSE SERPL-MCNC: 114 MG/DL (ref 70–110)
HCT VFR BLD AUTO: 31.1 % (ref 37–48.5)
HGB BLD-MCNC: 9.5 G/DL (ref 12–16)
IMM GRANULOCYTES # BLD AUTO: 0.01 K/UL (ref 0–0.04)
IMM GRANULOCYTES NFR BLD AUTO: 0.2 % (ref 0–0.5)
LYMPHOCYTES # BLD AUTO: 1.3 K/UL (ref 1–4.8)
LYMPHOCYTES NFR BLD: 29.5 % (ref 18–48)
MAGNESIUM SERPL-MCNC: 1.9 MG/DL (ref 1.6–2.6)
MCH RBC QN AUTO: 27.5 PG (ref 27–31)
MCHC RBC AUTO-ENTMCNC: 30.5 G/DL (ref 32–36)
MCV RBC AUTO: 90 FL (ref 82–98)
MONOCYTES # BLD AUTO: 0.4 K/UL (ref 0.3–1)
MONOCYTES NFR BLD: 9.7 % (ref 4–15)
NEUTROPHILS # BLD AUTO: 2.5 K/UL (ref 1.8–7.7)
NEUTROPHILS NFR BLD: 55.5 % (ref 38–73)
NRBC BLD-RTO: 0 /100 WBC
PHOSPHATE SERPL-MCNC: 2.9 MG/DL (ref 2.7–4.5)
PLATELET # BLD AUTO: 231 K/UL (ref 150–350)
PMV BLD AUTO: 11.2 FL (ref 9.2–12.9)
POTASSIUM SERPL-SCNC: 3.8 MMOL/L (ref 3.5–5.1)
PROT SERPL-MCNC: 6.9 G/DL (ref 6–8.4)
RBC # BLD AUTO: 3.46 M/UL (ref 4–5.4)
SODIUM SERPL-SCNC: 140 MMOL/L (ref 136–145)
WBC # BLD AUTO: 4.55 K/UL (ref 3.9–12.7)

## 2021-03-25 PROCEDURE — 80076 HEPATIC FUNCTION PANEL: CPT | Performed by: INTERNAL MEDICINE

## 2021-03-25 PROCEDURE — 36415 COLL VENOUS BLD VENIPUNCTURE: CPT | Performed by: INTERNAL MEDICINE

## 2021-03-25 PROCEDURE — 97110 THERAPEUTIC EXERCISES: CPT

## 2021-03-25 PROCEDURE — U0003 INFECTIOUS AGENT DETECTION BY NUCLEIC ACID (DNA OR RNA); SEVERE ACUTE RESPIRATORY SYNDROME CORONAVIRUS 2 (SARS-COV-2) (CORONAVIRUS DISEASE [COVID-19]), AMPLIFIED PROBE TECHNIQUE, MAKING USE OF HIGH THROUGHPUT TECHNOLOGIES AS DESCRIBED BY CMS-2020-01-R: HCPCS | Performed by: NURSE PRACTITIONER

## 2021-03-25 PROCEDURE — 80048 BASIC METABOLIC PNL TOTAL CA: CPT | Performed by: INTERNAL MEDICINE

## 2021-03-25 PROCEDURE — 97116 GAIT TRAINING THERAPY: CPT

## 2021-03-25 PROCEDURE — 85025 COMPLETE CBC W/AUTO DIFF WBC: CPT | Performed by: INTERNAL MEDICINE

## 2021-03-25 PROCEDURE — 97535 SELF CARE MNGMENT TRAINING: CPT | Mod: CO

## 2021-03-25 PROCEDURE — 25000003 PHARM REV CODE 250: Performed by: NURSE PRACTITIONER

## 2021-03-25 PROCEDURE — 25000003 PHARM REV CODE 250: Performed by: INTERNAL MEDICINE

## 2021-03-25 PROCEDURE — 11000004 HC SNF PRIVATE

## 2021-03-25 PROCEDURE — 84100 ASSAY OF PHOSPHORUS: CPT | Performed by: INTERNAL MEDICINE

## 2021-03-25 PROCEDURE — 83735 ASSAY OF MAGNESIUM: CPT | Performed by: INTERNAL MEDICINE

## 2021-03-25 RX ORDER — HYDROCORTISONE 25 MG/G
CREAM TOPICAL 2 TIMES DAILY
Status: DISCONTINUED | OUTPATIENT
Start: 2021-03-25 | End: 2021-03-26 | Stop reason: HOSPADM

## 2021-03-25 RX ADMIN — ACETAMINOPHEN 650 MG: 325 TABLET ORAL at 02:03

## 2021-03-25 RX ADMIN — LIDOCAINE: 50 OINTMENT TOPICAL at 08:03

## 2021-03-25 RX ADMIN — CHLORHEXIDINE GLUCONATE 0.12% ORAL RINSE 15 ML: 1.2 LIQUID ORAL at 09:03

## 2021-03-25 RX ADMIN — MECLIZINE HYDROCHLORIDE 25 MG: 12.5 TABLET ORAL at 02:03

## 2021-03-25 RX ADMIN — HYDROCORTISONE: 25 CREAM TOPICAL at 08:03

## 2021-03-25 RX ADMIN — ATORVASTATIN CALCIUM 40 MG: 20 TABLET, FILM COATED ORAL at 08:03

## 2021-03-25 RX ADMIN — CEPHALEXIN 500 MG: 500 CAPSULE ORAL at 08:03

## 2021-03-25 RX ADMIN — CEPHALEXIN 500 MG: 500 CAPSULE ORAL at 09:03

## 2021-03-25 RX ADMIN — OXYCODONE HYDROCHLORIDE 15 MG: 10 TABLET ORAL at 04:03

## 2021-03-25 RX ADMIN — MECLIZINE HYDROCHLORIDE 25 MG: 12.5 TABLET ORAL at 09:03

## 2021-03-25 RX ADMIN — MECLIZINE HYDROCHLORIDE 25 MG: 12.5 TABLET ORAL at 05:03

## 2021-03-25 RX ADMIN — OXYCODONE HYDROCHLORIDE 15 MG: 10 TABLET ORAL at 09:03

## 2021-03-25 RX ADMIN — CHLORHEXIDINE GLUCONATE 0.12% ORAL RINSE 15 ML: 1.2 LIQUID ORAL at 08:03

## 2021-03-25 RX ADMIN — LIDOCAINE: 50 OINTMENT TOPICAL at 04:03

## 2021-03-25 RX ADMIN — OXYCODONE HYDROCHLORIDE 15 MG: 10 TABLET ORAL at 02:03

## 2021-03-25 RX ADMIN — METOPROLOL TARTRATE 50 MG: 50 TABLET, FILM COATED ORAL at 08:03

## 2021-03-25 RX ADMIN — LIDOCAINE: 50 OINTMENT TOPICAL at 09:03

## 2021-03-25 RX ADMIN — OXYCODONE HYDROCHLORIDE 15 MG: 10 TABLET ORAL at 08:03

## 2021-03-25 RX ADMIN — CYANOCOBALAMIN TAB 1000 MCG 1000 MCG: 1000 TAB at 09:03

## 2021-03-25 RX ADMIN — DIPHENHYDRAMINE HYDROCHLORIDE, ZINC ACETATE: 2; .1 CREAM TOPICAL at 09:03

## 2021-03-25 RX ADMIN — PANTOPRAZOLE SODIUM 40 MG: 40 TABLET, DELAYED RELEASE ORAL at 09:03

## 2021-03-26 VITALS
HEART RATE: 60 BPM | SYSTOLIC BLOOD PRESSURE: 131 MMHG | BODY MASS INDEX: 29.97 KG/M2 | HEIGHT: 66 IN | DIASTOLIC BLOOD PRESSURE: 63 MMHG | TEMPERATURE: 98 F | WEIGHT: 186.5 LBS | RESPIRATION RATE: 18 BRPM | OXYGEN SATURATION: 96 %

## 2021-03-26 LAB — SARS-COV-2 RNA RESP QL NAA+PROBE: NOT DETECTED

## 2021-03-26 PROCEDURE — 25000003 PHARM REV CODE 250: Performed by: NURSE PRACTITIONER

## 2021-03-26 PROCEDURE — 25000003 PHARM REV CODE 250: Performed by: INTERNAL MEDICINE

## 2021-03-26 RX ADMIN — PANTOPRAZOLE SODIUM 40 MG: 40 TABLET, DELAYED RELEASE ORAL at 08:03

## 2021-03-26 RX ADMIN — OXYCODONE HYDROCHLORIDE 15 MG: 10 TABLET ORAL at 02:03

## 2021-03-26 RX ADMIN — AMLODIPINE BESYLATE 5 MG: 5 TABLET ORAL at 08:03

## 2021-03-26 RX ADMIN — METOPROLOL TARTRATE 50 MG: 50 TABLET, FILM COATED ORAL at 08:03

## 2021-03-26 RX ADMIN — OXYCODONE HYDROCHLORIDE 15 MG: 10 TABLET ORAL at 01:03

## 2021-03-26 RX ADMIN — LIDOCAINE: 50 OINTMENT TOPICAL at 08:03

## 2021-03-26 RX ADMIN — CHLORHEXIDINE GLUCONATE 0.12% ORAL RINSE 15 ML: 1.2 LIQUID ORAL at 08:03

## 2021-03-26 RX ADMIN — HYDROCORTISONE: 25 CREAM TOPICAL at 09:03

## 2021-03-26 RX ADMIN — OXYCODONE HYDROCHLORIDE 15 MG: 10 TABLET ORAL at 09:03

## 2021-03-26 RX ADMIN — LOPERAMIDE HYDROCHLORIDE 2 MG: 2 CAPSULE ORAL at 08:03

## 2021-03-26 RX ADMIN — CEPHALEXIN 500 MG: 500 CAPSULE ORAL at 08:03

## 2021-03-26 RX ADMIN — LOSARTAN POTASSIUM 100 MG: 50 TABLET, FILM COATED ORAL at 08:03

## 2021-03-26 RX ADMIN — MECLIZINE HYDROCHLORIDE 25 MG: 12.5 TABLET ORAL at 06:03

## 2021-03-26 RX ADMIN — CYANOCOBALAMIN TAB 1000 MCG 1000 MCG: 1000 TAB at 08:03

## 2021-03-26 RX ADMIN — MECLIZINE HYDROCHLORIDE 25 MG: 12.5 TABLET ORAL at 01:03

## 2021-03-30 ENCOUNTER — HOSPITAL ENCOUNTER (EMERGENCY)
Facility: HOSPITAL | Age: 71
Discharge: HOME | End: 2021-03-30
Attending: EMERGENCY MEDICINE
Payer: MEDICARE

## 2021-03-30 ENCOUNTER — TELEPHONE (OUTPATIENT)
Dept: NEUROSURGERY | Facility: CLINIC | Age: 71
End: 2021-03-30

## 2021-03-30 VITALS
HEIGHT: 66 IN | RESPIRATION RATE: 16 BRPM | SYSTOLIC BLOOD PRESSURE: 185 MMHG | HEART RATE: 89 BPM | DIASTOLIC BLOOD PRESSURE: 88 MMHG | WEIGHT: 186 LBS | TEMPERATURE: 99 F | OXYGEN SATURATION: 96 % | BODY MASS INDEX: 29.89 KG/M2

## 2021-03-30 DIAGNOSIS — R53.1 WEAKNESS: Primary | ICD-10-CM

## 2021-03-30 DIAGNOSIS — R42 DIZZINESS: ICD-10-CM

## 2021-03-30 DIAGNOSIS — R55 PRE-SYNCOPE: ICD-10-CM

## 2021-03-30 DIAGNOSIS — R51.9 ACUTE NONINTRACTABLE HEADACHE, UNSPECIFIED HEADACHE TYPE: ICD-10-CM

## 2021-03-30 LAB
ALBUMIN SERPL BCP-MCNC: 3.7 G/DL (ref 3.5–5.2)
ALP SERPL-CCNC: 147 U/L (ref 55–135)
ALT SERPL W/O P-5'-P-CCNC: 10 U/L (ref 10–44)
ANION GAP SERPL CALC-SCNC: 12 MMOL/L (ref 8–16)
AST SERPL-CCNC: 28 U/L (ref 10–40)
BACTERIA #/AREA URNS AUTO: ABNORMAL /HPF
BASOPHILS # BLD AUTO: 0.03 K/UL (ref 0–0.2)
BASOPHILS NFR BLD: 0.6 % (ref 0–1.9)
BILIRUB SERPL-MCNC: 0.8 MG/DL (ref 0.1–1)
BILIRUB UR QL STRIP: NEGATIVE
BNP SERPL-MCNC: 11 PG/ML (ref 0–99)
BUN SERPL-MCNC: 10 MG/DL (ref 6–30)
BUN SERPL-MCNC: 9 MG/DL (ref 8–23)
CALCIUM SERPL-MCNC: 9.4 MG/DL (ref 8.7–10.5)
CHLORIDE SERPL-SCNC: 104 MMOL/L (ref 95–110)
CHLORIDE SERPL-SCNC: 105 MMOL/L (ref 95–110)
CLARITY UR REFRACT.AUTO: ABNORMAL
CO2 SERPL-SCNC: 24 MMOL/L (ref 23–29)
COLOR UR AUTO: ABNORMAL
CREAT SERPL-MCNC: 0.9 MG/DL (ref 0.5–1.4)
CREAT SERPL-MCNC: 1 MG/DL (ref 0.5–1.4)
DIFFERENTIAL METHOD: ABNORMAL
EOSINOPHIL # BLD AUTO: 0 K/UL (ref 0–0.5)
EOSINOPHIL NFR BLD: 0.6 % (ref 0–8)
ERYTHROCYTE [DISTWIDTH] IN BLOOD BY AUTOMATED COUNT: 13.5 % (ref 11.5–14.5)
EST. GFR  (AFRICAN AMERICAN): >60 ML/MIN/1.73 M^2
EST. GFR  (NON AFRICAN AMERICAN): 58 ML/MIN/1.73 M^2
GLUCOSE SERPL-MCNC: 107 MG/DL (ref 70–110)
GLUCOSE SERPL-MCNC: 110 MG/DL (ref 70–110)
GLUCOSE UR QL STRIP: NEGATIVE
HCT VFR BLD AUTO: 36.5 % (ref 37–48.5)
HCT VFR BLD CALC: 36 %PCV (ref 36–54)
HGB BLD-MCNC: 11.8 G/DL (ref 12–16)
HGB UR QL STRIP: ABNORMAL
IMM GRANULOCYTES # BLD AUTO: 0.02 K/UL (ref 0–0.04)
IMM GRANULOCYTES NFR BLD AUTO: 0.4 % (ref 0–0.5)
INR PPP: 1 (ref 0.8–1.2)
KETONES UR QL STRIP: NEGATIVE
LEUKOCYTE ESTERASE UR QL STRIP: ABNORMAL
LYMPHOCYTES # BLD AUTO: 0.9 K/UL (ref 1–4.8)
LYMPHOCYTES NFR BLD: 18.7 % (ref 18–48)
MCH RBC QN AUTO: 27.4 PG (ref 27–31)
MCHC RBC AUTO-ENTMCNC: 32.3 G/DL (ref 32–36)
MCV RBC AUTO: 85 FL (ref 82–98)
MICROSCOPIC COMMENT: ABNORMAL
MONOCYTES # BLD AUTO: 0.4 K/UL (ref 0.3–1)
MONOCYTES NFR BLD: 8.1 % (ref 4–15)
NEUTROPHILS # BLD AUTO: 3.4 K/UL (ref 1.8–7.7)
NEUTROPHILS NFR BLD: 71.6 % (ref 38–73)
NITRITE UR QL STRIP: NEGATIVE
NRBC BLD-RTO: 0 /100 WBC
PH UR STRIP: 6 [PH] (ref 5–8)
PLATELET # BLD AUTO: 279 K/UL (ref 150–450)
PMV BLD AUTO: 11.2 FL (ref 9.2–12.9)
POC IONIZED CALCIUM: 1.09 MMOL/L (ref 1.06–1.42)
POC TCO2 (MEASURED): 32 MMOL/L (ref 23–29)
POTASSIUM BLD-SCNC: 3.6 MMOL/L (ref 3.5–5.1)
POTASSIUM SERPL-SCNC: 3.8 MMOL/L (ref 3.5–5.1)
PROT SERPL-MCNC: 8.4 G/DL (ref 6–8.4)
PROT UR QL STRIP: NEGATIVE
PROTHROMBIN TIME: 11.3 SEC (ref 9–12.5)
RBC # BLD AUTO: 4.3 M/UL (ref 4–5.4)
RBC #/AREA URNS AUTO: 20 /HPF (ref 0–4)
SAMPLE: ABNORMAL
SODIUM BLD-SCNC: 140 MMOL/L (ref 136–145)
SODIUM SERPL-SCNC: 141 MMOL/L (ref 136–145)
SP GR UR STRIP: 1.01 (ref 1–1.03)
SQUAMOUS #/AREA URNS AUTO: 5 /HPF
TROPONIN I SERPL DL<=0.01 NG/ML-MCNC: <0.006 NG/ML (ref 0–0.03)
URN SPEC COLLECT METH UR: ABNORMAL
WBC # BLD AUTO: 4.81 K/UL (ref 3.9–12.7)
WBC #/AREA URNS AUTO: 15 /HPF (ref 0–5)

## 2021-03-30 PROCEDURE — 93005 ELECTROCARDIOGRAM TRACING: CPT

## 2021-03-30 PROCEDURE — 25000003 PHARM REV CODE 250: Performed by: EMERGENCY MEDICINE

## 2021-03-30 PROCEDURE — 99284 PR EMERGENCY DEPT VISIT,LEVEL IV: ICD-10-PCS | Mod: ,,, | Performed by: EMERGENCY MEDICINE

## 2021-03-30 PROCEDURE — 93010 EKG 12-LEAD: ICD-10-PCS | Mod: ,,, | Performed by: INTERNAL MEDICINE

## 2021-03-30 PROCEDURE — 85610 PROTHROMBIN TIME: CPT | Performed by: EMERGENCY MEDICINE

## 2021-03-30 PROCEDURE — 84484 ASSAY OF TROPONIN QUANT: CPT | Performed by: EMERGENCY MEDICINE

## 2021-03-30 PROCEDURE — 93010 ELECTROCARDIOGRAM REPORT: CPT | Mod: ,,, | Performed by: INTERNAL MEDICINE

## 2021-03-30 PROCEDURE — 99285 EMERGENCY DEPT VISIT HI MDM: CPT | Mod: 25

## 2021-03-30 PROCEDURE — 87086 URINE CULTURE/COLONY COUNT: CPT | Performed by: EMERGENCY MEDICINE

## 2021-03-30 PROCEDURE — 85025 COMPLETE CBC W/AUTO DIFF WBC: CPT | Performed by: EMERGENCY MEDICINE

## 2021-03-30 PROCEDURE — 81001 URINALYSIS AUTO W/SCOPE: CPT | Performed by: EMERGENCY MEDICINE

## 2021-03-30 PROCEDURE — 99284 EMERGENCY DEPT VISIT MOD MDM: CPT | Mod: ,,, | Performed by: EMERGENCY MEDICINE

## 2021-03-30 PROCEDURE — 87077 CULTURE AEROBIC IDENTIFY: CPT | Performed by: EMERGENCY MEDICINE

## 2021-03-30 PROCEDURE — 87088 URINE BACTERIA CULTURE: CPT | Performed by: EMERGENCY MEDICINE

## 2021-03-30 PROCEDURE — 87186 SC STD MICRODIL/AGAR DIL: CPT | Performed by: EMERGENCY MEDICINE

## 2021-03-30 PROCEDURE — 80053 COMPREHEN METABOLIC PANEL: CPT | Performed by: EMERGENCY MEDICINE

## 2021-03-30 PROCEDURE — 83880 ASSAY OF NATRIURETIC PEPTIDE: CPT | Performed by: EMERGENCY MEDICINE

## 2021-03-30 RX ORDER — OXYCODONE HYDROCHLORIDE 5 MG/1
10 TABLET ORAL
Status: COMPLETED | OUTPATIENT
Start: 2021-03-30 | End: 2021-03-30

## 2021-03-30 RX ORDER — MECLIZINE HCL 12.5 MG 12.5 MG/1
25 TABLET ORAL
Status: COMPLETED | OUTPATIENT
Start: 2021-03-30 | End: 2021-03-30

## 2021-03-30 RX ORDER — DIPHENHYDRAMINE HYDROCHLORIDE 50 MG/ML
25 INJECTION INTRAMUSCULAR; INTRAVENOUS
Status: DISCONTINUED | OUTPATIENT
Start: 2021-03-30 | End: 2021-03-30

## 2021-03-30 RX ORDER — METOCLOPRAMIDE HYDROCHLORIDE 5 MG/ML
10 INJECTION INTRAMUSCULAR; INTRAVENOUS
Status: DISCONTINUED | OUTPATIENT
Start: 2021-03-30 | End: 2021-03-30

## 2021-03-30 RX ORDER — METOCLOPRAMIDE 10 MG/1
10 TABLET ORAL
Status: DISCONTINUED | OUTPATIENT
Start: 2021-03-30 | End: 2021-03-30

## 2021-03-30 RX ORDER — ACETAMINOPHEN 325 MG/1
650 TABLET ORAL
Status: COMPLETED | OUTPATIENT
Start: 2021-03-30 | End: 2021-03-30

## 2021-03-30 RX ORDER — OXYCODONE HYDROCHLORIDE 5 MG/1
5 TABLET ORAL
Status: COMPLETED | OUTPATIENT
Start: 2021-03-30 | End: 2021-03-30

## 2021-03-30 RX ADMIN — SODIUM CHLORIDE 1000 ML: 0.9 INJECTION, SOLUTION INTRAVENOUS at 01:03

## 2021-03-30 RX ADMIN — OXYCODONE 10 MG: 5 TABLET ORAL at 03:03

## 2021-03-30 RX ADMIN — MECLIZINE 25 MG: 12.5 TABLET ORAL at 01:03

## 2021-03-30 RX ADMIN — OXYCODONE 5 MG: 5 TABLET ORAL at 03:03

## 2021-03-30 RX ADMIN — ACETAMINOPHEN 650 MG: 325 TABLET ORAL at 01:03

## 2021-03-31 ENCOUNTER — TELEPHONE (OUTPATIENT)
Dept: EMERGENCY MEDICINE | Facility: HOSPITAL | Age: 71
End: 2021-03-31

## 2021-03-31 ENCOUNTER — EXTERNAL CHRONIC CARE MANAGEMENT (OUTPATIENT)
Dept: PRIMARY CARE CLINIC | Facility: CLINIC | Age: 71
End: 2021-03-31
Payer: MEDICARE

## 2021-03-31 ENCOUNTER — PES CALL (OUTPATIENT)
Dept: ADMINISTRATIVE | Facility: CLINIC | Age: 71
End: 2021-03-31

## 2021-03-31 ENCOUNTER — TELEPHONE (OUTPATIENT)
Dept: FAMILY MEDICINE | Facility: CLINIC | Age: 71
End: 2021-03-31

## 2021-03-31 DIAGNOSIS — R53.1 FUNCTIONAL WEAKNESS: ICD-10-CM

## 2021-03-31 DIAGNOSIS — E34.0 CARCINOID SYNDROME: Primary | ICD-10-CM

## 2021-03-31 DIAGNOSIS — Z86.73 HISTORY OF CVA (CEREBROVASCULAR ACCIDENT): ICD-10-CM

## 2021-03-31 PROCEDURE — 99490 PR CHRONIC CARE MGMT, 1ST 20 MIN: ICD-10-PCS | Mod: S$GLB,,, | Performed by: INTERNAL MEDICINE

## 2021-03-31 PROCEDURE — 99490 CHRNC CARE MGMT STAFF 1ST 20: CPT | Mod: S$GLB,,, | Performed by: INTERNAL MEDICINE

## 2021-04-01 LAB — BACTERIA UR CULT: ABNORMAL

## 2021-04-05 ENCOUNTER — TELEPHONE (OUTPATIENT)
Dept: FAMILY MEDICINE | Facility: CLINIC | Age: 71
End: 2021-04-05

## 2021-04-07 ENCOUNTER — OFFICE VISIT (OUTPATIENT)
Dept: NEUROSURGERY | Facility: CLINIC | Age: 71
End: 2021-04-07
Payer: MEDICARE

## 2021-04-07 ENCOUNTER — TELEPHONE (OUTPATIENT)
Dept: NEUROSURGERY | Facility: CLINIC | Age: 71
End: 2021-04-07

## 2021-04-07 VITALS
WEIGHT: 183 LBS | HEART RATE: 51 BPM | DIASTOLIC BLOOD PRESSURE: 68 MMHG | SYSTOLIC BLOOD PRESSURE: 120 MMHG | BODY MASS INDEX: 29.41 KG/M2 | HEIGHT: 66 IN | TEMPERATURE: 99 F

## 2021-04-07 DIAGNOSIS — M54.12 RADICULOPATHY, CERVICAL REGION: ICD-10-CM

## 2021-04-07 PROCEDURE — 99214 PR OFFICE/OUTPT VISIT, EST, LEVL IV, 30-39 MIN: ICD-10-PCS | Mod: S$GLB,,, | Performed by: PHYSICIAN ASSISTANT

## 2021-04-07 PROCEDURE — 1159F MED LIST DOCD IN RCRD: CPT | Mod: S$GLB,,, | Performed by: PHYSICIAN ASSISTANT

## 2021-04-07 PROCEDURE — 1125F PR PAIN SEVERITY QUANTIFIED, PAIN PRESENT: ICD-10-PCS | Mod: S$GLB,,, | Performed by: PHYSICIAN ASSISTANT

## 2021-04-07 PROCEDURE — 3074F PR MOST RECENT SYSTOLIC BLOOD PRESSURE < 130 MM HG: ICD-10-PCS | Mod: CPTII,S$GLB,, | Performed by: PHYSICIAN ASSISTANT

## 2021-04-07 PROCEDURE — 99999 PR PBB SHADOW E&M-EST. PATIENT-LVL III: CPT | Mod: PBBFAC,,, | Performed by: PHYSICIAN ASSISTANT

## 2021-04-07 PROCEDURE — 3008F PR BODY MASS INDEX (BMI) DOCUMENTED: ICD-10-PCS | Mod: CPTII,S$GLB,, | Performed by: PHYSICIAN ASSISTANT

## 2021-04-07 PROCEDURE — 1159F PR MEDICATION LIST DOCUMENTED IN MEDICAL RECORD: ICD-10-PCS | Mod: S$GLB,,, | Performed by: PHYSICIAN ASSISTANT

## 2021-04-07 PROCEDURE — 1100F PTFALLS ASSESS-DOCD GE2>/YR: CPT | Mod: CPTII,S$GLB,, | Performed by: PHYSICIAN ASSISTANT

## 2021-04-07 PROCEDURE — 3078F DIAST BP <80 MM HG: CPT | Mod: CPTII,S$GLB,, | Performed by: PHYSICIAN ASSISTANT

## 2021-04-07 PROCEDURE — 99999 PR PBB SHADOW E&M-EST. PATIENT-LVL III: ICD-10-PCS | Mod: PBBFAC,,, | Performed by: PHYSICIAN ASSISTANT

## 2021-04-07 PROCEDURE — 3008F BODY MASS INDEX DOCD: CPT | Mod: CPTII,S$GLB,, | Performed by: PHYSICIAN ASSISTANT

## 2021-04-07 PROCEDURE — 1125F AMNT PAIN NOTED PAIN PRSNT: CPT | Mod: S$GLB,,, | Performed by: PHYSICIAN ASSISTANT

## 2021-04-07 PROCEDURE — 3288F PR FALLS RISK ASSESSMENT DOCUMENTED: ICD-10-PCS | Mod: CPTII,S$GLB,, | Performed by: PHYSICIAN ASSISTANT

## 2021-04-07 PROCEDURE — 3288F FALL RISK ASSESSMENT DOCD: CPT | Mod: CPTII,S$GLB,, | Performed by: PHYSICIAN ASSISTANT

## 2021-04-07 PROCEDURE — 3078F PR MOST RECENT DIASTOLIC BLOOD PRESSURE < 80 MM HG: ICD-10-PCS | Mod: CPTII,S$GLB,, | Performed by: PHYSICIAN ASSISTANT

## 2021-04-07 PROCEDURE — 3074F SYST BP LT 130 MM HG: CPT | Mod: CPTII,S$GLB,, | Performed by: PHYSICIAN ASSISTANT

## 2021-04-07 PROCEDURE — 99214 OFFICE O/P EST MOD 30 MIN: CPT | Mod: S$GLB,,, | Performed by: PHYSICIAN ASSISTANT

## 2021-04-07 PROCEDURE — 1100F PR PT FALLS ASSESS DOC 2+ FALLS/FALL W/INJURY/YR: ICD-10-PCS | Mod: CPTII,S$GLB,, | Performed by: PHYSICIAN ASSISTANT

## 2021-04-09 ENCOUNTER — TELEPHONE (OUTPATIENT)
Dept: NEUROSURGERY | Facility: CLINIC | Age: 71
End: 2021-04-09

## 2021-04-11 PROBLEM — E83.39 HYPOPHOSPHATEMIA: Status: ACTIVE | Noted: 2021-04-11

## 2021-04-11 PROBLEM — R19.7 DIARRHEA: Status: ACTIVE | Noted: 2021-04-11

## 2021-04-12 PROBLEM — E83.39 HYPOPHOSPHATEMIA: Status: RESOLVED | Noted: 2021-04-11 | Resolved: 2021-04-12

## 2021-04-14 ENCOUNTER — TELEPHONE (OUTPATIENT)
Dept: NEUROSURGERY | Facility: CLINIC | Age: 71
End: 2021-04-14

## 2021-04-14 RX ORDER — MECLIZINE HYDROCHLORIDE 25 MG/1
25 TABLET ORAL 3 TIMES DAILY PRN
Qty: 30 TABLET | Refills: 0 | Status: SHIPPED | OUTPATIENT
Start: 2021-04-14 | End: 2021-05-06

## 2021-04-15 PROCEDURE — G0180 MD CERTIFICATION HHA PATIENT: HCPCS | Mod: ,,, | Performed by: INTERNAL MEDICINE

## 2021-04-15 PROCEDURE — G0180 PR HOME HEALTH MD CERTIFICATION: ICD-10-PCS | Mod: ,,, | Performed by: INTERNAL MEDICINE

## 2021-04-19 ENCOUNTER — PES CALL (OUTPATIENT)
Dept: ADMINISTRATIVE | Facility: CLINIC | Age: 71
End: 2021-04-19

## 2021-04-21 ENCOUNTER — DOCUMENT SCAN (OUTPATIENT)
Dept: HOME HEALTH SERVICES | Facility: HOSPITAL | Age: 71
End: 2021-04-21
Payer: MEDICARE

## 2021-04-22 ENCOUNTER — EXTERNAL HOME HEALTH (OUTPATIENT)
Dept: HOME HEALTH SERVICES | Facility: HOSPITAL | Age: 71
End: 2021-04-22
Payer: MEDICARE

## 2021-04-26 ENCOUNTER — TELEPHONE (OUTPATIENT)
Dept: NEUROSURGERY | Facility: CLINIC | Age: 71
End: 2021-04-26

## 2021-04-28 ENCOUNTER — DOCUMENT SCAN (OUTPATIENT)
Dept: HOME HEALTH SERVICES | Facility: HOSPITAL | Age: 71
End: 2021-04-28
Payer: MEDICARE

## 2021-04-28 ENCOUNTER — TELEPHONE (OUTPATIENT)
Dept: NEUROLOGY | Facility: HOSPITAL | Age: 71
End: 2021-04-28

## 2021-04-28 ENCOUNTER — OFFICE VISIT (OUTPATIENT)
Dept: FAMILY MEDICINE | Facility: CLINIC | Age: 71
End: 2021-04-28
Payer: MEDICARE

## 2021-04-28 VITALS
HEIGHT: 66 IN | SYSTOLIC BLOOD PRESSURE: 132 MMHG | OXYGEN SATURATION: 98 % | BODY MASS INDEX: 28.93 KG/M2 | HEART RATE: 66 BPM | WEIGHT: 180 LBS | DIASTOLIC BLOOD PRESSURE: 66 MMHG

## 2021-04-28 DIAGNOSIS — D51.8 OTHER VITAMIN B12 DEFICIENCY ANEMIA: ICD-10-CM

## 2021-04-28 DIAGNOSIS — F51.01 PRIMARY INSOMNIA: Primary | ICD-10-CM

## 2021-04-28 PROCEDURE — 99214 PR OFFICE/OUTPT VISIT, EST, LEVL IV, 30-39 MIN: ICD-10-PCS | Mod: 25,S$GLB,, | Performed by: FAMILY MEDICINE

## 2021-04-28 PROCEDURE — 3288F PR FALLS RISK ASSESSMENT DOCUMENTED: ICD-10-PCS | Mod: CPTII,S$GLB,, | Performed by: FAMILY MEDICINE

## 2021-04-28 PROCEDURE — 3008F PR BODY MASS INDEX (BMI) DOCUMENTED: ICD-10-PCS | Mod: CPTII,S$GLB,, | Performed by: FAMILY MEDICINE

## 2021-04-28 PROCEDURE — 1125F AMNT PAIN NOTED PAIN PRSNT: CPT | Mod: S$GLB,,, | Performed by: FAMILY MEDICINE

## 2021-04-28 PROCEDURE — 3288F FALL RISK ASSESSMENT DOCD: CPT | Mod: CPTII,S$GLB,, | Performed by: FAMILY MEDICINE

## 2021-04-28 PROCEDURE — 1159F MED LIST DOCD IN RCRD: CPT | Mod: S$GLB,,, | Performed by: FAMILY MEDICINE

## 2021-04-28 PROCEDURE — 96372 THER/PROPH/DIAG INJ SC/IM: CPT | Mod: S$GLB,,, | Performed by: FAMILY MEDICINE

## 2021-04-28 PROCEDURE — 96372 PR INJECTION,THERAP/PROPH/DIAG2ST, IM OR SUBCUT: ICD-10-PCS | Mod: S$GLB,,, | Performed by: FAMILY MEDICINE

## 2021-04-28 PROCEDURE — 99214 OFFICE O/P EST MOD 30 MIN: CPT | Mod: 25,S$GLB,, | Performed by: FAMILY MEDICINE

## 2021-04-28 PROCEDURE — 1100F PTFALLS ASSESS-DOCD GE2>/YR: CPT | Mod: CPTII,S$GLB,, | Performed by: FAMILY MEDICINE

## 2021-04-28 PROCEDURE — 3008F BODY MASS INDEX DOCD: CPT | Mod: CPTII,S$GLB,, | Performed by: FAMILY MEDICINE

## 2021-04-28 PROCEDURE — 99999 PR PBB SHADOW E&M-EST. PATIENT-LVL IV: ICD-10-PCS | Mod: PBBFAC,,, | Performed by: FAMILY MEDICINE

## 2021-04-28 PROCEDURE — 1125F PR PAIN SEVERITY QUANTIFIED, PAIN PRESENT: ICD-10-PCS | Mod: S$GLB,,, | Performed by: FAMILY MEDICINE

## 2021-04-28 PROCEDURE — 1159F PR MEDICATION LIST DOCUMENTED IN MEDICAL RECORD: ICD-10-PCS | Mod: S$GLB,,, | Performed by: FAMILY MEDICINE

## 2021-04-28 PROCEDURE — 1100F PR PT FALLS ASSESS DOC 2+ FALLS/FALL W/INJURY/YR: ICD-10-PCS | Mod: CPTII,S$GLB,, | Performed by: FAMILY MEDICINE

## 2021-04-28 PROCEDURE — 99999 PR PBB SHADOW E&M-EST. PATIENT-LVL IV: CPT | Mod: PBBFAC,,, | Performed by: FAMILY MEDICINE

## 2021-04-28 RX ORDER — CYANOCOBALAMIN 1000 UG/ML
1000 INJECTION, SOLUTION INTRAMUSCULAR; SUBCUTANEOUS ONCE
Status: COMPLETED | OUTPATIENT
Start: 2021-04-28 | End: 2021-04-28

## 2021-04-28 RX ORDER — HYDROXYZINE PAMOATE 25 MG/1
25 CAPSULE ORAL NIGHTLY PRN
Qty: 30 CAPSULE | Refills: 0 | Status: SHIPPED | OUTPATIENT
Start: 2021-04-28 | End: 2021-05-16

## 2021-04-28 RX ADMIN — CYANOCOBALAMIN 1000 MCG: 1000 INJECTION, SOLUTION INTRAMUSCULAR; SUBCUTANEOUS at 11:04

## 2021-04-29 ENCOUNTER — OUTPATIENT CASE MANAGEMENT (OUTPATIENT)
Dept: ADMINISTRATIVE | Facility: OTHER | Age: 71
End: 2021-04-29

## 2021-04-29 ENCOUNTER — DOCUMENT SCAN (OUTPATIENT)
Dept: HOME HEALTH SERVICES | Facility: HOSPITAL | Age: 71
End: 2021-04-29
Payer: MEDICARE

## 2021-04-30 ENCOUNTER — TELEPHONE (OUTPATIENT)
Dept: FAMILY MEDICINE | Facility: CLINIC | Age: 71
End: 2021-04-30

## 2021-04-30 ENCOUNTER — EXTERNAL CHRONIC CARE MANAGEMENT (OUTPATIENT)
Dept: PRIMARY CARE CLINIC | Facility: CLINIC | Age: 71
End: 2021-04-30
Payer: MEDICARE

## 2021-04-30 PROCEDURE — 99490 PR CHRONIC CARE MGMT, 1ST 20 MIN: ICD-10-PCS | Mod: S$GLB,,, | Performed by: INTERNAL MEDICINE

## 2021-04-30 PROCEDURE — 99490 CHRNC CARE MGMT STAFF 1ST 20: CPT | Mod: S$GLB,,, | Performed by: INTERNAL MEDICINE

## 2021-04-30 PROCEDURE — 99439 PR CHRONIC CARE MGMT, EA ADDTL 20 MIN: ICD-10-PCS | Mod: S$GLB,,, | Performed by: INTERNAL MEDICINE

## 2021-04-30 PROCEDURE — 99439 CHRNC CARE MGMT STAF EA ADDL: CPT | Mod: S$GLB,,, | Performed by: INTERNAL MEDICINE

## 2021-05-03 ENCOUNTER — TELEPHONE (OUTPATIENT)
Dept: NEUROSURGERY | Facility: CLINIC | Age: 71
End: 2021-05-03

## 2021-05-05 ENCOUNTER — OUTPATIENT CASE MANAGEMENT (OUTPATIENT)
Dept: ADMINISTRATIVE | Facility: OTHER | Age: 71
End: 2021-05-05

## 2021-05-06 ENCOUNTER — TELEPHONE (OUTPATIENT)
Dept: NEUROSURGERY | Facility: CLINIC | Age: 71
End: 2021-05-06

## 2021-05-09 ENCOUNTER — HOSPITAL ENCOUNTER (EMERGENCY)
Facility: HOSPITAL | Age: 71
Discharge: HOME | End: 2021-05-10
Attending: EMERGENCY MEDICINE
Payer: MEDICARE

## 2021-05-09 DIAGNOSIS — E87.6 HYPOKALEMIA: ICD-10-CM

## 2021-05-09 DIAGNOSIS — E83.39 HYPOPHOSPHATEMIA: ICD-10-CM

## 2021-05-09 DIAGNOSIS — N39.0 URINARY TRACT INFECTION WITHOUT HEMATURIA, SITE UNSPECIFIED: ICD-10-CM

## 2021-05-09 DIAGNOSIS — E83.42 HYPOMAGNESEMIA: Primary | ICD-10-CM

## 2021-05-09 DIAGNOSIS — Z85.9 HISTORY OF MALIGNANT CARCINOID TUMOR: ICD-10-CM

## 2021-05-09 LAB
ALBUMIN SERPL BCP-MCNC: 3.5 G/DL (ref 3.5–5.2)
ALP SERPL-CCNC: 130 U/L (ref 55–135)
ALT SERPL W/O P-5'-P-CCNC: 9 U/L (ref 10–44)
ANION GAP SERPL CALC-SCNC: 12 MMOL/L (ref 8–16)
AST SERPL-CCNC: 15 U/L (ref 10–40)
BACTERIA #/AREA URNS HPF: ABNORMAL /HPF
BASOPHILS # BLD AUTO: 0.02 K/UL (ref 0–0.2)
BASOPHILS NFR BLD: 0.3 % (ref 0–1.9)
BILIRUB SERPL-MCNC: 0.4 MG/DL (ref 0.1–1)
BILIRUB UR QL STRIP: NEGATIVE
BUN SERPL-MCNC: 16 MG/DL (ref 8–23)
CALCIUM SERPL-MCNC: 8.7 MG/DL (ref 8.7–10.5)
CHLORIDE SERPL-SCNC: 109 MMOL/L (ref 95–110)
CLARITY UR: ABNORMAL
CO2 SERPL-SCNC: 22 MMOL/L (ref 23–29)
COLOR UR: YELLOW
CREAT SERPL-MCNC: 1.1 MG/DL (ref 0.5–1.4)
DIFFERENTIAL METHOD: ABNORMAL
EOSINOPHIL # BLD AUTO: 0.1 K/UL (ref 0–0.5)
EOSINOPHIL NFR BLD: 1.2 % (ref 0–8)
ERYTHROCYTE [DISTWIDTH] IN BLOOD BY AUTOMATED COUNT: 14.4 % (ref 11.5–14.5)
EST. GFR  (AFRICAN AMERICAN): 60 ML/MIN/1.73 M^2
EST. GFR  (NON AFRICAN AMERICAN): 52 ML/MIN/1.73 M^2
GLUCOSE SERPL-MCNC: 110 MG/DL (ref 70–110)
GLUCOSE UR QL STRIP: NEGATIVE
HCT VFR BLD AUTO: 30.2 % (ref 37–48.5)
HGB BLD-MCNC: 9.6 G/DL (ref 12–16)
HGB UR QL STRIP: ABNORMAL
IMM GRANULOCYTES # BLD AUTO: 0.04 K/UL (ref 0–0.04)
IMM GRANULOCYTES NFR BLD AUTO: 0.7 % (ref 0–0.5)
KETONES UR QL STRIP: NEGATIVE
LACTATE SERPL-SCNC: 1 MMOL/L (ref 0.5–2.2)
LEUKOCYTE ESTERASE UR QL STRIP: ABNORMAL
LIPASE SERPL-CCNC: 17 U/L (ref 4–60)
LYMPHOCYTES # BLD AUTO: 1 K/UL (ref 1–4.8)
LYMPHOCYTES NFR BLD: 17.2 % (ref 18–48)
MAGNESIUM SERPL-MCNC: 1.3 MG/DL (ref 1.6–2.6)
MCH RBC QN AUTO: 27.7 PG (ref 27–31)
MCHC RBC AUTO-ENTMCNC: 31.8 G/DL (ref 32–36)
MCV RBC AUTO: 87 FL (ref 82–98)
MICROSCOPIC COMMENT: ABNORMAL
MONOCYTES # BLD AUTO: 0.6 K/UL (ref 0.3–1)
MONOCYTES NFR BLD: 9.4 % (ref 4–15)
NEUTROPHILS # BLD AUTO: 4.3 K/UL (ref 1.8–7.7)
NEUTROPHILS NFR BLD: 71.2 % (ref 38–73)
NITRITE UR QL STRIP: POSITIVE
NRBC BLD-RTO: 0 /100 WBC
PH UR STRIP: 6 [PH] (ref 5–8)
PHOSPHATE SERPL-MCNC: 2.4 MG/DL (ref 2.7–4.5)
PLATELET # BLD AUTO: 253 K/UL (ref 150–450)
PMV BLD AUTO: 11.2 FL (ref 9.2–12.9)
POTASSIUM SERPL-SCNC: 3 MMOL/L (ref 3.5–5.1)
PROT SERPL-MCNC: 7.4 G/DL (ref 6–8.4)
PROT UR QL STRIP: NEGATIVE
RBC # BLD AUTO: 3.47 M/UL (ref 4–5.4)
RBC #/AREA URNS HPF: 5 /HPF (ref 0–4)
SODIUM SERPL-SCNC: 143 MMOL/L (ref 136–145)
SP GR UR STRIP: 1.01 (ref 1–1.03)
URN SPEC COLLECT METH UR: ABNORMAL
UROBILINOGEN UR STRIP-ACNC: NEGATIVE EU/DL
WBC # BLD AUTO: 6.04 K/UL (ref 3.9–12.7)
WBC #/AREA URNS HPF: 20 /HPF (ref 0–5)

## 2021-05-09 PROCEDURE — 63600175 PHARM REV CODE 636 W HCPCS: Performed by: EMERGENCY MEDICINE

## 2021-05-09 PROCEDURE — 84100 ASSAY OF PHOSPHORUS: CPT | Performed by: EMERGENCY MEDICINE

## 2021-05-09 PROCEDURE — 96366 THER/PROPH/DIAG IV INF ADDON: CPT

## 2021-05-09 PROCEDURE — 83605 ASSAY OF LACTIC ACID: CPT | Performed by: EMERGENCY MEDICINE

## 2021-05-09 PROCEDURE — 87088 URINE BACTERIA CULTURE: CPT | Performed by: EMERGENCY MEDICINE

## 2021-05-09 PROCEDURE — 87077 CULTURE AEROBIC IDENTIFY: CPT | Performed by: EMERGENCY MEDICINE

## 2021-05-09 PROCEDURE — 93010 EKG 12-LEAD: ICD-10-PCS | Mod: ,,, | Performed by: INTERNAL MEDICINE

## 2021-05-09 PROCEDURE — 25000003 PHARM REV CODE 250: Performed by: EMERGENCY MEDICINE

## 2021-05-09 PROCEDURE — 99284 EMERGENCY DEPT VISIT MOD MDM: CPT | Mod: 25

## 2021-05-09 PROCEDURE — 93005 ELECTROCARDIOGRAM TRACING: CPT

## 2021-05-09 PROCEDURE — 96375 TX/PRO/DX INJ NEW DRUG ADDON: CPT

## 2021-05-09 PROCEDURE — 85025 COMPLETE CBC W/AUTO DIFF WBC: CPT | Performed by: EMERGENCY MEDICINE

## 2021-05-09 PROCEDURE — 83690 ASSAY OF LIPASE: CPT | Performed by: EMERGENCY MEDICINE

## 2021-05-09 PROCEDURE — 96365 THER/PROPH/DIAG IV INF INIT: CPT

## 2021-05-09 PROCEDURE — 87086 URINE CULTURE/COLONY COUNT: CPT | Performed by: EMERGENCY MEDICINE

## 2021-05-09 PROCEDURE — 87186 SC STD MICRODIL/AGAR DIL: CPT | Performed by: EMERGENCY MEDICINE

## 2021-05-09 PROCEDURE — 80053 COMPREHEN METABOLIC PANEL: CPT | Performed by: EMERGENCY MEDICINE

## 2021-05-09 PROCEDURE — 93010 ELECTROCARDIOGRAM REPORT: CPT | Mod: ,,, | Performed by: INTERNAL MEDICINE

## 2021-05-09 PROCEDURE — 96361 HYDRATE IV INFUSION ADD-ON: CPT

## 2021-05-09 PROCEDURE — 81000 URINALYSIS NONAUTO W/SCOPE: CPT | Performed by: EMERGENCY MEDICINE

## 2021-05-09 PROCEDURE — 96367 TX/PROPH/DG ADDL SEQ IV INF: CPT

## 2021-05-09 PROCEDURE — 83735 ASSAY OF MAGNESIUM: CPT | Performed by: EMERGENCY MEDICINE

## 2021-05-09 RX ORDER — MORPHINE SULFATE 4 MG/ML
4 INJECTION, SOLUTION INTRAMUSCULAR; INTRAVENOUS
Status: COMPLETED | OUTPATIENT
Start: 2021-05-09 | End: 2021-05-09

## 2021-05-09 RX ORDER — MAGNESIUM SULFATE HEPTAHYDRATE 40 MG/ML
2 INJECTION, SOLUTION INTRAVENOUS
Status: COMPLETED | OUTPATIENT
Start: 2021-05-09 | End: 2021-05-10

## 2021-05-09 RX ORDER — POTASSIUM CHLORIDE 7.45 MG/ML
10 INJECTION INTRAVENOUS ONCE
Status: COMPLETED | OUTPATIENT
Start: 2021-05-09 | End: 2021-05-10

## 2021-05-09 RX ORDER — SODIUM CHLORIDE 9 MG/ML
INJECTION, SOLUTION INTRAVENOUS
Status: COMPLETED | OUTPATIENT
Start: 2021-05-09 | End: 2021-05-09

## 2021-05-09 RX ORDER — CEPHALEXIN 500 MG/1
500 CAPSULE ORAL EVERY 8 HOURS
Qty: 15 CAPSULE | Refills: 0 | Status: SHIPPED | OUTPATIENT
Start: 2021-05-09 | End: 2021-05-14

## 2021-05-09 RX ORDER — HYDRALAZINE HYDROCHLORIDE 20 MG/ML
10 INJECTION INTRAMUSCULAR; INTRAVENOUS
Status: COMPLETED | OUTPATIENT
Start: 2021-05-09 | End: 2021-05-09

## 2021-05-09 RX ORDER — OXYCODONE HYDROCHLORIDE 5 MG/1
15 TABLET ORAL
Status: COMPLETED | OUTPATIENT
Start: 2021-05-09 | End: 2021-05-09

## 2021-05-09 RX ORDER — POTASSIUM CHLORIDE 20 MEQ/1
40 TABLET, EXTENDED RELEASE ORAL
Status: COMPLETED | OUTPATIENT
Start: 2021-05-09 | End: 2021-05-09

## 2021-05-09 RX ADMIN — MORPHINE SULFATE 4 MG: 4 INJECTION INTRAVENOUS at 08:05

## 2021-05-09 RX ADMIN — SODIUM CHLORIDE: 0.9 INJECTION, SOLUTION INTRAVENOUS at 08:05

## 2021-05-09 RX ADMIN — POTASSIUM CHLORIDE 40 MEQ: 1500 TABLET, EXTENDED RELEASE ORAL at 10:05

## 2021-05-09 RX ADMIN — CEFTRIAXONE 1 G: 1 INJECTION, SOLUTION INTRAVENOUS at 10:05

## 2021-05-09 RX ADMIN — HYDRALAZINE HYDROCHLORIDE 10 MG: 20 INJECTION, SOLUTION INTRAMUSCULAR; INTRAVENOUS at 10:05

## 2021-05-09 RX ADMIN — MAGNESIUM SULFATE IN WATER 2 G: 40 INJECTION, SOLUTION INTRAVENOUS at 10:05

## 2021-05-09 RX ADMIN — OXYCODONE HYDROCHLORIDE 15 MG: 5 TABLET ORAL at 11:05

## 2021-05-10 VITALS
RESPIRATION RATE: 19 BRPM | HEART RATE: 76 BPM | BODY MASS INDEX: 29.05 KG/M2 | SYSTOLIC BLOOD PRESSURE: 176 MMHG | DIASTOLIC BLOOD PRESSURE: 76 MMHG | TEMPERATURE: 98 F | OXYGEN SATURATION: 98 % | WEIGHT: 180 LBS

## 2021-05-10 PROCEDURE — 63600175 PHARM REV CODE 636 W HCPCS: Performed by: EMERGENCY MEDICINE

## 2021-05-10 RX ADMIN — POTASSIUM CHLORIDE 10 MEQ: 7.46 INJECTION, SOLUTION INTRAVENOUS at 12:05

## 2021-05-11 LAB — BACTERIA UR CULT: ABNORMAL

## 2021-05-13 ENCOUNTER — OUTPATIENT CASE MANAGEMENT (OUTPATIENT)
Dept: ADMINISTRATIVE | Facility: OTHER | Age: 71
End: 2021-05-13

## 2021-05-14 ENCOUNTER — TELEPHONE (OUTPATIENT)
Dept: NEUROLOGY | Facility: HOSPITAL | Age: 71
End: 2021-05-14

## 2021-05-18 ENCOUNTER — OUTPATIENT CASE MANAGEMENT (OUTPATIENT)
Dept: ADMINISTRATIVE | Facility: OTHER | Age: 71
End: 2021-05-18

## 2021-05-18 ENCOUNTER — TELEPHONE (OUTPATIENT)
Dept: FAMILY MEDICINE | Facility: CLINIC | Age: 71
End: 2021-05-18

## 2021-05-18 DIAGNOSIS — B37.9 YEAST INFECTION: Primary | ICD-10-CM

## 2021-05-18 RX ORDER — FLUCONAZOLE 150 MG/1
150 TABLET ORAL DAILY
Qty: 1 TABLET | Refills: 0 | Status: SHIPPED | OUTPATIENT
Start: 2021-05-18 | End: 2021-05-19

## 2021-05-20 ENCOUNTER — OFFICE VISIT (OUTPATIENT)
Dept: NEUROLOGY | Facility: HOSPITAL | Age: 71
End: 2021-05-20
Attending: SURGERY
Payer: MEDICARE

## 2021-05-20 VITALS
RESPIRATION RATE: 18 BRPM | WEIGHT: 177.38 LBS | HEIGHT: 66 IN | TEMPERATURE: 99 F | SYSTOLIC BLOOD PRESSURE: 118 MMHG | HEART RATE: 60 BPM | DIASTOLIC BLOOD PRESSURE: 67 MMHG | BODY MASS INDEX: 28.51 KG/M2

## 2021-05-20 DIAGNOSIS — C7B.8 SECONDARY NEUROENDOCRINE TUMOR OF LIVER: Primary | ICD-10-CM

## 2021-05-20 PROCEDURE — 99215 OFFICE O/P EST HI 40 MIN: CPT | Performed by: SURGERY

## 2021-05-20 RX ORDER — DIPHENOXYLATE HYDROCHLORIDE AND ATROPINE SULFATE 2.5; .025 MG/1; MG/1
1 TABLET ORAL 4 TIMES DAILY PRN
COMMUNITY
Start: 2021-04-28 | End: 2021-06-15 | Stop reason: SDUPTHER

## 2021-05-20 RX ORDER — LIDOCAINE HYDROCHLORIDE 20 MG/ML
JELLY TOPICAL
Status: ON HOLD | COMMUNITY
Start: 2021-02-11 | End: 2021-07-09 | Stop reason: HOSPADM

## 2021-05-21 ENCOUNTER — OUTPATIENT CASE MANAGEMENT (OUTPATIENT)
Dept: ADMINISTRATIVE | Facility: OTHER | Age: 71
End: 2021-05-21

## 2021-05-28 ENCOUNTER — OFFICE VISIT (OUTPATIENT)
Dept: FAMILY MEDICINE | Facility: CLINIC | Age: 71
End: 2021-05-28
Payer: MEDICARE

## 2021-05-28 VITALS — BODY MASS INDEX: 28.69 KG/M2 | HEIGHT: 66 IN | WEIGHT: 178.5 LBS | TEMPERATURE: 98 F | RESPIRATION RATE: 18 BRPM

## 2021-05-28 DIAGNOSIS — R35.89 POLYURIA: ICD-10-CM

## 2021-05-28 DIAGNOSIS — N30.00 ACUTE CYSTITIS WITHOUT HEMATURIA: Primary | ICD-10-CM

## 2021-05-28 PROCEDURE — 99999 PR PBB SHADOW E&M-EST. PATIENT-LVL III: ICD-10-PCS | Mod: PBBFAC,,, | Performed by: FAMILY MEDICINE

## 2021-05-28 PROCEDURE — 3288F PR FALLS RISK ASSESSMENT DOCUMENTED: ICD-10-PCS | Mod: CPTII,S$GLB,, | Performed by: FAMILY MEDICINE

## 2021-05-28 PROCEDURE — 87077 CULTURE AEROBIC IDENTIFY: CPT | Performed by: FAMILY MEDICINE

## 2021-05-28 PROCEDURE — 87186 SC STD MICRODIL/AGAR DIL: CPT | Performed by: FAMILY MEDICINE

## 2021-05-28 PROCEDURE — 1159F PR MEDICATION LIST DOCUMENTED IN MEDICAL RECORD: ICD-10-PCS | Mod: S$GLB,,, | Performed by: FAMILY MEDICINE

## 2021-05-28 PROCEDURE — 3008F PR BODY MASS INDEX (BMI) DOCUMENTED: ICD-10-PCS | Mod: CPTII,S$GLB,, | Performed by: FAMILY MEDICINE

## 2021-05-28 PROCEDURE — 3288F FALL RISK ASSESSMENT DOCD: CPT | Mod: CPTII,S$GLB,, | Performed by: FAMILY MEDICINE

## 2021-05-28 PROCEDURE — 1126F AMNT PAIN NOTED NONE PRSNT: CPT | Mod: S$GLB,,, | Performed by: FAMILY MEDICINE

## 2021-05-28 PROCEDURE — 1126F PR PAIN SEVERITY QUANTIFIED, NO PAIN PRESENT: ICD-10-PCS | Mod: S$GLB,,, | Performed by: FAMILY MEDICINE

## 2021-05-28 PROCEDURE — 99214 OFFICE O/P EST MOD 30 MIN: CPT | Mod: S$GLB,,, | Performed by: FAMILY MEDICINE

## 2021-05-28 PROCEDURE — 87086 URINE CULTURE/COLONY COUNT: CPT | Performed by: FAMILY MEDICINE

## 2021-05-28 PROCEDURE — 3008F BODY MASS INDEX DOCD: CPT | Mod: CPTII,S$GLB,, | Performed by: FAMILY MEDICINE

## 2021-05-28 PROCEDURE — 1101F PR PT FALLS ASSESS DOC 0-1 FALLS W/OUT INJ PAST YR: ICD-10-PCS | Mod: CPTII,S$GLB,, | Performed by: FAMILY MEDICINE

## 2021-05-28 PROCEDURE — 1159F MED LIST DOCD IN RCRD: CPT | Mod: S$GLB,,, | Performed by: FAMILY MEDICINE

## 2021-05-28 PROCEDURE — 87088 URINE BACTERIA CULTURE: CPT | Performed by: FAMILY MEDICINE

## 2021-05-28 PROCEDURE — 1101F PT FALLS ASSESS-DOCD LE1/YR: CPT | Mod: CPTII,S$GLB,, | Performed by: FAMILY MEDICINE

## 2021-05-28 PROCEDURE — 99214 PR OFFICE/OUTPT VISIT, EST, LEVL IV, 30-39 MIN: ICD-10-PCS | Mod: S$GLB,,, | Performed by: FAMILY MEDICINE

## 2021-05-28 PROCEDURE — 99999 PR PBB SHADOW E&M-EST. PATIENT-LVL III: CPT | Mod: PBBFAC,,, | Performed by: FAMILY MEDICINE

## 2021-05-28 RX ORDER — CIPROFLOXACIN 500 MG/1
500 TABLET ORAL 2 TIMES DAILY
Qty: 10 TABLET | Refills: 0 | Status: SHIPPED | OUTPATIENT
Start: 2021-05-28 | End: 2021-06-02

## 2021-05-30 LAB — BACTERIA UR CULT: ABNORMAL

## 2021-05-31 ENCOUNTER — TELEPHONE (OUTPATIENT)
Dept: FAMILY MEDICINE | Facility: CLINIC | Age: 71
End: 2021-05-31

## 2021-05-31 ENCOUNTER — EXTERNAL CHRONIC CARE MANAGEMENT (OUTPATIENT)
Dept: PRIMARY CARE CLINIC | Facility: CLINIC | Age: 71
End: 2021-05-31
Payer: MEDICARE

## 2021-05-31 PROCEDURE — 99490 CHRNC CARE MGMT STAFF 1ST 20: CPT | Mod: S$GLB,,, | Performed by: INTERNAL MEDICINE

## 2021-05-31 PROCEDURE — 99490 PR CHRONIC CARE MGMT, 1ST 20 MIN: ICD-10-PCS | Mod: S$GLB,,, | Performed by: INTERNAL MEDICINE

## 2021-05-31 RX ORDER — CEFDINIR 300 MG/1
300 CAPSULE ORAL 2 TIMES DAILY
Qty: 10 CAPSULE | Refills: 0 | Status: SHIPPED | OUTPATIENT
Start: 2021-05-31 | End: 2021-06-05

## 2021-06-04 ENCOUNTER — OUTPATIENT CASE MANAGEMENT (OUTPATIENT)
Dept: ADMINISTRATIVE | Facility: OTHER | Age: 71
End: 2021-06-04

## 2021-06-11 ENCOUNTER — OUTPATIENT CASE MANAGEMENT (OUTPATIENT)
Dept: ADMINISTRATIVE | Facility: OTHER | Age: 71
End: 2021-06-11

## 2021-06-14 ENCOUNTER — TELEPHONE (OUTPATIENT)
Dept: FAMILY MEDICINE | Facility: CLINIC | Age: 71
End: 2021-06-14

## 2021-06-15 ENCOUNTER — OFFICE VISIT (OUTPATIENT)
Dept: FAMILY MEDICINE | Facility: CLINIC | Age: 71
End: 2021-06-15
Payer: MEDICARE

## 2021-06-15 VITALS
WEIGHT: 181.5 LBS | BODY MASS INDEX: 29.17 KG/M2 | HEART RATE: 46 BPM | SYSTOLIC BLOOD PRESSURE: 148 MMHG | OXYGEN SATURATION: 98 % | HEIGHT: 66 IN | DIASTOLIC BLOOD PRESSURE: 68 MMHG | RESPIRATION RATE: 18 BRPM | TEMPERATURE: 98 F

## 2021-06-15 DIAGNOSIS — R35.0 FREQUENCY OF URINATION AND POLYURIA: Primary | ICD-10-CM

## 2021-06-15 DIAGNOSIS — R35.89 FREQUENCY OF URINATION AND POLYURIA: Primary | ICD-10-CM

## 2021-06-15 DIAGNOSIS — R05.9 COUGH: ICD-10-CM

## 2021-06-15 LAB
BILIRUB SERPL-MCNC: ABNORMAL MG/DL
BLOOD URINE, POC: ABNORMAL
CLARITY, POC UA: CLEAR
COLOR, POC UA: ABNORMAL
GLUCOSE UR QL STRIP: NORMAL
KETONES UR QL STRIP: ABNORMAL
LEUKOCYTE ESTERASE URINE, POC: ABNORMAL
NITRITE, POC UA: ABNORMAL
PH, POC UA: 9
PROTEIN, POC: ABNORMAL
SPECIFIC GRAVITY, POC UA: 1
UROBILINOGEN, POC UA: ABNORMAL

## 2021-06-15 PROCEDURE — 1100F PTFALLS ASSESS-DOCD GE2>/YR: CPT | Mod: CPTII,S$GLB,, | Performed by: FAMILY MEDICINE

## 2021-06-15 PROCEDURE — 99999 PR PBB SHADOW E&M-EST. PATIENT-LVL V: CPT | Mod: PBBFAC,,, | Performed by: FAMILY MEDICINE

## 2021-06-15 PROCEDURE — 3008F BODY MASS INDEX DOCD: CPT | Mod: CPTII,S$GLB,, | Performed by: FAMILY MEDICINE

## 2021-06-15 PROCEDURE — 3288F FALL RISK ASSESSMENT DOCD: CPT | Mod: CPTII,S$GLB,, | Performed by: FAMILY MEDICINE

## 2021-06-15 PROCEDURE — 1159F MED LIST DOCD IN RCRD: CPT | Mod: S$GLB,,, | Performed by: FAMILY MEDICINE

## 2021-06-15 PROCEDURE — 3288F PR FALLS RISK ASSESSMENT DOCUMENTED: ICD-10-PCS | Mod: CPTII,S$GLB,, | Performed by: FAMILY MEDICINE

## 2021-06-15 PROCEDURE — 99214 OFFICE O/P EST MOD 30 MIN: CPT | Mod: 25,S$GLB,, | Performed by: FAMILY MEDICINE

## 2021-06-15 PROCEDURE — 1125F AMNT PAIN NOTED PAIN PRSNT: CPT | Mod: S$GLB,,, | Performed by: FAMILY MEDICINE

## 2021-06-15 PROCEDURE — 81002 URINALYSIS NONAUTO W/O SCOPE: CPT | Mod: S$GLB,,, | Performed by: FAMILY MEDICINE

## 2021-06-15 PROCEDURE — 1159F PR MEDICATION LIST DOCUMENTED IN MEDICAL RECORD: ICD-10-PCS | Mod: S$GLB,,, | Performed by: FAMILY MEDICINE

## 2021-06-15 PROCEDURE — 1125F PR PAIN SEVERITY QUANTIFIED, PAIN PRESENT: ICD-10-PCS | Mod: S$GLB,,, | Performed by: FAMILY MEDICINE

## 2021-06-15 PROCEDURE — 99214 PR OFFICE/OUTPT VISIT, EST, LEVL IV, 30-39 MIN: ICD-10-PCS | Mod: 25,S$GLB,, | Performed by: FAMILY MEDICINE

## 2021-06-15 PROCEDURE — 81002 POCT URINE DIPSTICK WITHOUT MICROSCOPE: ICD-10-PCS | Mod: S$GLB,,, | Performed by: FAMILY MEDICINE

## 2021-06-15 PROCEDURE — 99999 PR PBB SHADOW E&M-EST. PATIENT-LVL V: ICD-10-PCS | Mod: PBBFAC,,, | Performed by: FAMILY MEDICINE

## 2021-06-15 PROCEDURE — 3008F PR BODY MASS INDEX (BMI) DOCUMENTED: ICD-10-PCS | Mod: CPTII,S$GLB,, | Performed by: FAMILY MEDICINE

## 2021-06-15 PROCEDURE — 1100F PR PT FALLS ASSESS DOC 2+ FALLS/FALL W/INJURY/YR: ICD-10-PCS | Mod: CPTII,S$GLB,, | Performed by: FAMILY MEDICINE

## 2021-06-15 PROCEDURE — 87086 URINE CULTURE/COLONY COUNT: CPT | Performed by: FAMILY MEDICINE

## 2021-06-15 RX ORDER — DIPHENOXYLATE HYDROCHLORIDE AND ATROPINE SULFATE 2.5; .025 MG/1; MG/1
1 TABLET ORAL 4 TIMES DAILY PRN
Qty: 20 TABLET | Refills: 0 | Status: ON HOLD | OUTPATIENT
Start: 2021-06-15 | End: 2021-10-07 | Stop reason: HOSPADM

## 2021-06-15 RX ORDER — AMLODIPINE BESYLATE 5 MG/1
5 TABLET ORAL DAILY
Qty: 30 TABLET | Refills: 11 | Status: ON HOLD | OUTPATIENT
Start: 2021-06-15 | End: 2021-10-07 | Stop reason: HOSPADM

## 2021-06-15 RX ORDER — AZELASTINE 1 MG/ML
1 SPRAY, METERED NASAL 2 TIMES DAILY
Qty: 30 ML | Refills: 0 | Status: ON HOLD | OUTPATIENT
Start: 2021-06-15 | End: 2021-11-05

## 2021-06-15 RX ORDER — CEFDINIR 300 MG/1
300 CAPSULE ORAL 2 TIMES DAILY
Qty: 20 CAPSULE | Refills: 0 | Status: ON HOLD | OUTPATIENT
Start: 2021-06-15 | End: 2021-06-28 | Stop reason: HOSPADM

## 2021-06-15 RX ORDER — BENZONATATE 100 MG/1
100 CAPSULE ORAL 3 TIMES DAILY PRN
Qty: 30 CAPSULE | Refills: 0 | Status: ON HOLD | OUTPATIENT
Start: 2021-06-15 | End: 2021-06-28 | Stop reason: HOSPADM

## 2021-06-16 LAB
BACTERIA UR CULT: NORMAL
BACTERIA UR CULT: NORMAL

## 2021-06-16 NOTE — TELEPHONE ENCOUNTER
Spoke with Bambi Mensah RN. She states that the patient wants to make her appt on 1/21/20 and that is not possible because Dr. Perry only has clinic on Mondays and Thursdays. So Ms Mensah is leaving her appt on 1/20/20   Detail Level: Zone Quality 226: Preventive Care And Screening: Tobacco Use: Screening And Cessation Intervention: Patient screened for tobacco use, is a smoker AND received Cessation Counseling

## 2021-06-18 ENCOUNTER — TELEPHONE (OUTPATIENT)
Dept: FAMILY MEDICINE | Facility: CLINIC | Age: 71
End: 2021-06-18

## 2021-06-20 ENCOUNTER — NURSE TRIAGE (OUTPATIENT)
Dept: ADMINISTRATIVE | Facility: CLINIC | Age: 71
End: 2021-06-20

## 2021-06-20 ENCOUNTER — HOSPITAL ENCOUNTER (EMERGENCY)
Facility: HOSPITAL | Age: 71
Discharge: HOME | End: 2021-06-20
Attending: EMERGENCY MEDICINE
Payer: MEDICARE

## 2021-06-20 VITALS
OXYGEN SATURATION: 100 % | BODY MASS INDEX: 29.09 KG/M2 | TEMPERATURE: 98 F | RESPIRATION RATE: 18 BRPM | SYSTOLIC BLOOD PRESSURE: 174 MMHG | HEART RATE: 87 BPM | HEIGHT: 66 IN | DIASTOLIC BLOOD PRESSURE: 87 MMHG | WEIGHT: 181 LBS

## 2021-06-20 DIAGNOSIS — N39.0 LOWER URINARY TRACT INFECTIOUS DISEASE: ICD-10-CM

## 2021-06-20 DIAGNOSIS — R30.0 DYSURIA: Primary | ICD-10-CM

## 2021-06-20 LAB
ALBUMIN SERPL BCP-MCNC: 3.3 G/DL (ref 3.5–5.2)
ALP SERPL-CCNC: 199 U/L (ref 55–135)
ALT SERPL W/O P-5'-P-CCNC: 42 U/L (ref 10–44)
ANION GAP SERPL CALC-SCNC: 14 MMOL/L (ref 8–16)
AST SERPL-CCNC: 45 U/L (ref 10–40)
BACTERIA #/AREA URNS HPF: ABNORMAL /HPF
BASOPHILS # BLD AUTO: 0.01 K/UL (ref 0–0.2)
BASOPHILS NFR BLD: 0.2 % (ref 0–1.9)
BILIRUB SERPL-MCNC: 1 MG/DL (ref 0.1–1)
BILIRUB UR QL STRIP: NEGATIVE
BUN SERPL-MCNC: 16 MG/DL (ref 8–23)
CALCIUM SERPL-MCNC: 8.8 MG/DL (ref 8.7–10.5)
CHLORIDE SERPL-SCNC: 106 MMOL/L (ref 95–110)
CLARITY UR: ABNORMAL
CO2 SERPL-SCNC: 17 MMOL/L (ref 23–29)
COLOR UR: YELLOW
CREAT SERPL-MCNC: 1.1 MG/DL (ref 0.5–1.4)
DIFFERENTIAL METHOD: ABNORMAL
EOSINOPHIL # BLD AUTO: 0 K/UL (ref 0–0.5)
EOSINOPHIL NFR BLD: 0.4 % (ref 0–8)
ERYTHROCYTE [DISTWIDTH] IN BLOOD BY AUTOMATED COUNT: 13.6 % (ref 11.5–14.5)
EST. GFR  (AFRICAN AMERICAN): 60 ML/MIN/1.73 M^2
EST. GFR  (NON AFRICAN AMERICAN): 52 ML/MIN/1.73 M^2
GLUCOSE SERPL-MCNC: 129 MG/DL (ref 70–110)
GLUCOSE UR QL STRIP: NEGATIVE
HCT VFR BLD AUTO: 32.6 % (ref 37–48.5)
HGB BLD-MCNC: 9.8 G/DL (ref 12–16)
HGB UR QL STRIP: ABNORMAL
IMM GRANULOCYTES # BLD AUTO: 0.04 K/UL (ref 0–0.04)
IMM GRANULOCYTES NFR BLD AUTO: 0.7 % (ref 0–0.5)
KETONES UR QL STRIP: NEGATIVE
LEUKOCYTE ESTERASE UR QL STRIP: ABNORMAL
LYMPHOCYTES # BLD AUTO: 0.7 K/UL (ref 1–4.8)
LYMPHOCYTES NFR BLD: 12.4 % (ref 18–48)
MCH RBC QN AUTO: 27.8 PG (ref 27–31)
MCHC RBC AUTO-ENTMCNC: 30.1 G/DL (ref 32–36)
MCV RBC AUTO: 93 FL (ref 82–98)
MICROSCOPIC COMMENT: ABNORMAL
MONOCYTES # BLD AUTO: 0.6 K/UL (ref 0.3–1)
MONOCYTES NFR BLD: 11.4 % (ref 4–15)
NEUTROPHILS # BLD AUTO: 4.2 K/UL (ref 1.8–7.7)
NEUTROPHILS NFR BLD: 74.9 % (ref 38–73)
NITRITE UR QL STRIP: POSITIVE
NRBC BLD-RTO: 0 /100 WBC
PH UR STRIP: 7 [PH] (ref 5–8)
PLATELET # BLD AUTO: 156 K/UL (ref 150–450)
PMV BLD AUTO: 10.7 FL (ref 9.2–12.9)
POTASSIUM SERPL-SCNC: 3.6 MMOL/L (ref 3.5–5.1)
PROT SERPL-MCNC: 7 G/DL (ref 6–8.4)
PROT UR QL STRIP: ABNORMAL
RBC # BLD AUTO: 3.52 M/UL (ref 4–5.4)
RBC #/AREA URNS HPF: 4 /HPF (ref 0–4)
SODIUM SERPL-SCNC: 137 MMOL/L (ref 136–145)
SP GR UR STRIP: 1.02 (ref 1–1.03)
SQUAMOUS #/AREA URNS HPF: 6 /HPF
URN SPEC COLLECT METH UR: ABNORMAL
UROBILINOGEN UR STRIP-ACNC: NEGATIVE EU/DL
WBC # BLD AUTO: 5.63 K/UL (ref 3.9–12.7)
WBC #/AREA URNS HPF: >100 /HPF (ref 0–5)

## 2021-06-20 PROCEDURE — 99284 EMERGENCY DEPT VISIT MOD MDM: CPT | Mod: 25

## 2021-06-20 PROCEDURE — 87088 URINE BACTERIA CULTURE: CPT | Performed by: EMERGENCY MEDICINE

## 2021-06-20 PROCEDURE — 87077 CULTURE AEROBIC IDENTIFY: CPT | Performed by: EMERGENCY MEDICINE

## 2021-06-20 PROCEDURE — 87186 SC STD MICRODIL/AGAR DIL: CPT | Performed by: EMERGENCY MEDICINE

## 2021-06-20 PROCEDURE — 96361 HYDRATE IV INFUSION ADD-ON: CPT

## 2021-06-20 PROCEDURE — 87086 URINE CULTURE/COLONY COUNT: CPT | Performed by: EMERGENCY MEDICINE

## 2021-06-20 PROCEDURE — 80053 COMPREHEN METABOLIC PANEL: CPT | Performed by: EMERGENCY MEDICINE

## 2021-06-20 PROCEDURE — 25000003 PHARM REV CODE 250: Performed by: EMERGENCY MEDICINE

## 2021-06-20 PROCEDURE — 96365 THER/PROPH/DIAG IV INF INIT: CPT

## 2021-06-20 PROCEDURE — 85025 COMPLETE CBC W/AUTO DIFF WBC: CPT | Performed by: EMERGENCY MEDICINE

## 2021-06-20 PROCEDURE — 63600175 PHARM REV CODE 636 W HCPCS: Performed by: EMERGENCY MEDICINE

## 2021-06-20 PROCEDURE — 81000 URINALYSIS NONAUTO W/SCOPE: CPT | Performed by: EMERGENCY MEDICINE

## 2021-06-20 RX ORDER — OXYBUTYNIN CHLORIDE 5 MG/1
5 TABLET ORAL EVERY 8 HOURS PRN
Qty: 18 TABLET | Refills: 0 | Status: ON HOLD | OUTPATIENT
Start: 2021-06-20 | End: 2021-11-26

## 2021-06-20 RX ORDER — OXYCODONE HYDROCHLORIDE 5 MG/1
15 TABLET ORAL
Status: COMPLETED | OUTPATIENT
Start: 2021-06-20 | End: 2021-06-20

## 2021-06-20 RX ORDER — OXYBUTYNIN CHLORIDE 5 MG/1
5 TABLET ORAL
Status: COMPLETED | OUTPATIENT
Start: 2021-06-20 | End: 2021-06-20

## 2021-06-20 RX ORDER — PROCHLORPERAZINE EDISYLATE 5 MG/ML
10 INJECTION INTRAMUSCULAR; INTRAVENOUS
Status: DISCONTINUED | OUTPATIENT
Start: 2021-06-20 | End: 2021-06-20 | Stop reason: HOSPADM

## 2021-06-20 RX ADMIN — OXYCODONE HYDROCHLORIDE 15 MG: 5 TABLET ORAL at 04:06

## 2021-06-20 RX ADMIN — OXYBUTYNIN CHLORIDE 5 MG: 5 TABLET ORAL at 06:06

## 2021-06-20 RX ADMIN — SODIUM CHLORIDE 1000 ML: 0.9 INJECTION, SOLUTION INTRAVENOUS at 03:06

## 2021-06-20 RX ADMIN — CEFTRIAXONE 1 G: 1 INJECTION, SOLUTION INTRAVENOUS at 05:06

## 2021-06-21 ENCOUNTER — OUTPATIENT CASE MANAGEMENT (OUTPATIENT)
Dept: ADMINISTRATIVE | Facility: OTHER | Age: 71
End: 2021-06-21

## 2021-06-22 ENCOUNTER — TELEPHONE (OUTPATIENT)
Dept: NEUROLOGY | Facility: HOSPITAL | Age: 71
End: 2021-06-22

## 2021-06-23 ENCOUNTER — TELEPHONE (OUTPATIENT)
Dept: NEUROLOGY | Facility: HOSPITAL | Age: 71
End: 2021-06-23

## 2021-06-23 DIAGNOSIS — Z01.818 PRE-OP TESTING: Primary | ICD-10-CM

## 2021-06-23 DIAGNOSIS — C7A.094: ICD-10-CM

## 2021-06-24 DIAGNOSIS — C7B.8 SECONDARY NEUROENDOCRINE TUMOR OF LIVER: Primary | ICD-10-CM

## 2021-06-24 DIAGNOSIS — C7B.02 SECONDARY MALIGNANT CARCINOID TUMOR OF LIVER: Primary | ICD-10-CM

## 2021-06-24 DIAGNOSIS — C7B.02 METASTATIC MALIGNANT CARCINOID TUMOR TO LIVER: ICD-10-CM

## 2021-06-24 LAB — BACTERIA UR CULT: ABNORMAL

## 2021-06-24 RX ORDER — ONDANSETRON 2 MG/ML
4 INJECTION INTRAMUSCULAR; INTRAVENOUS EVERY 8 HOURS PRN
Status: CANCELLED | OUTPATIENT
Start: 2021-06-24

## 2021-06-24 RX ORDER — SODIUM CHLORIDE 9 MG/ML
INJECTION, SOLUTION INTRAVENOUS CONTINUOUS
Status: CANCELLED | OUTPATIENT
Start: 2021-06-24

## 2021-06-24 RX ORDER — MAGNESIUM SULFATE HEPTAHYDRATE 40 MG/ML
2 INJECTION, SOLUTION INTRAVENOUS ONCE
Status: CANCELLED | OUTPATIENT
Start: 2021-06-24 | End: 2021-06-24

## 2021-06-24 RX ORDER — DEXTROSE MONOHYDRATE, SODIUM CHLORIDE, AND POTASSIUM CHLORIDE 50; 1.49; 4.5 G/1000ML; G/1000ML; G/1000ML
INJECTION, SOLUTION INTRAVENOUS CONTINUOUS
Status: CANCELLED | OUTPATIENT
Start: 2021-06-24

## 2021-06-24 RX ORDER — MIDAZOLAM HYDROCHLORIDE 1 MG/ML
0.5 INJECTION INTRAMUSCULAR; INTRAVENOUS ONCE
Status: CANCELLED | OUTPATIENT
Start: 2021-06-24 | End: 2021-06-24

## 2021-06-24 RX ORDER — SODIUM CHLORIDE 9 MG/ML
INJECTION, SOLUTION INTRAVENOUS ONCE
Status: CANCELLED | OUTPATIENT
Start: 2021-06-24 | End: 2021-06-24

## 2021-06-24 RX ORDER — FAMOTIDINE 20 MG/1
20 TABLET, FILM COATED ORAL EVERY 12 HOURS
Status: CANCELLED | OUTPATIENT
Start: 2021-06-24

## 2021-06-24 RX ORDER — DIPHENHYDRAMINE HYDROCHLORIDE 50 MG/ML
50 INJECTION INTRAMUSCULAR; INTRAVENOUS ONCE
Status: CANCELLED | OUTPATIENT
Start: 2021-06-24 | End: 2021-06-24

## 2021-06-24 RX ORDER — FENTANYL CITRATE 50 UG/ML
50 INJECTION, SOLUTION INTRAMUSCULAR; INTRAVENOUS ONCE
Status: CANCELLED | OUTPATIENT
Start: 2021-06-24 | End: 2021-06-24

## 2021-06-24 RX ORDER — DEXAMETHASONE SODIUM PHOSPHATE 4 MG/ML
8 INJECTION, SOLUTION INTRA-ARTICULAR; INTRALESIONAL; INTRAMUSCULAR; INTRAVENOUS; SOFT TISSUE ONCE
Status: CANCELLED | OUTPATIENT
Start: 2021-06-24 | End: 2021-06-24

## 2021-06-24 RX ORDER — TRAMADOL HYDROCHLORIDE AND ACETAMINOPHEN 37.5; 325 MG/1; MG/1
1 TABLET, FILM COATED ORAL EVERY 4 HOURS PRN
Status: CANCELLED | OUTPATIENT
Start: 2021-06-24

## 2021-06-24 RX ORDER — IBUPROFEN 200 MG
400 TABLET ORAL EVERY 4 HOURS PRN
Status: CANCELLED | OUTPATIENT
Start: 2021-06-24

## 2021-06-24 RX ORDER — PREDNISONE 50 MG/1
50 TABLET ORAL SEE ADMIN INSTRUCTIONS
Qty: 3 TABLET | Refills: 0 | Status: ON HOLD | OUTPATIENT
Start: 2021-06-24 | End: 2021-06-28 | Stop reason: HOSPADM

## 2021-06-24 RX ORDER — FUROSEMIDE 10 MG/ML
20 INJECTION INTRAMUSCULAR; INTRAVENOUS ONCE
Status: CANCELLED | OUTPATIENT
Start: 2021-06-24 | End: 2021-06-24

## 2021-06-25 ENCOUNTER — TELEPHONE (OUTPATIENT)
Dept: NEUROLOGY | Facility: HOSPITAL | Age: 71
End: 2021-06-25

## 2021-06-25 ENCOUNTER — HOSPITAL ENCOUNTER (OUTPATIENT)
Dept: INTERVENTIONAL RADIOLOGY/VASCULAR | Facility: HOSPITAL | Age: 71
Discharge: HOME | End: 2021-06-29
Attending: SURGERY | Admitting: SURGERY
Payer: MEDICARE

## 2021-06-25 ENCOUNTER — ANESTHESIA EVENT (OUTPATIENT)
Dept: INTERVENTIONAL RADIOLOGY/VASCULAR | Facility: HOSPITAL | Age: 71
End: 2021-06-25
Payer: MEDICARE

## 2021-06-25 DIAGNOSIS — C7B.8 SECONDARY NEUROENDOCRINE TUMOR OF LIVER: ICD-10-CM

## 2021-06-25 DIAGNOSIS — R10.9 ABDOMINAL PAIN, UNSPECIFIED ABDOMINAL LOCATION: ICD-10-CM

## 2021-06-25 DIAGNOSIS — R93.422 ABNORMAL RADIOLOGIC FINDINGS ON DIAGNOSTIC IMAGING OF LEFT KIDNEY: ICD-10-CM

## 2021-06-25 LAB — SARS-COV-2 RDRP RESP QL NAA+PROBE: NEGATIVE

## 2021-06-25 PROCEDURE — 99152 MOD SED SAME PHYS/QHP 5/>YRS: CPT | Performed by: RADIOLOGY

## 2021-06-25 PROCEDURE — 25000003 PHARM REV CODE 250: Performed by: NURSE ANESTHETIST, CERTIFIED REGISTERED

## 2021-06-25 PROCEDURE — U0002 COVID-19 LAB TEST NON-CDC: HCPCS | Performed by: RADIOLOGY

## 2021-06-25 PROCEDURE — 96420 CHEMO IA PUSH TECNIQUE: CPT | Performed by: RADIOLOGY

## 2021-06-25 PROCEDURE — 37243 IR EMBOLIZATION COMP FOR TUMOR_ORGAN ISCHEMIA_INFARC: ICD-10-PCS | Mod: ,,, | Performed by: RADIOLOGY

## 2021-06-25 PROCEDURE — 75726 ARTERY X-RAYS ABDOMEN: CPT | Mod: 26,59,, | Performed by: RADIOLOGY

## 2021-06-25 PROCEDURE — 25000003 PHARM REV CODE 250: Performed by: SURGERY

## 2021-06-25 PROCEDURE — 63600175 PHARM REV CODE 636 W HCPCS: Performed by: SURGERY

## 2021-06-25 PROCEDURE — 75774 PR  ANGIO EA ADDNL SELECTV VESSEL: ICD-10-PCS | Mod: 26,59,, | Performed by: RADIOLOGY

## 2021-06-25 PROCEDURE — 37243 VASC EMBOLIZE/OCCLUDE ORGAN: CPT | Performed by: RADIOLOGY

## 2021-06-25 PROCEDURE — 75726 CHG ANGIO VISCERAL SELECTV/SUBSELEC: ICD-10-PCS | Mod: 26,59,, | Performed by: RADIOLOGY

## 2021-06-25 PROCEDURE — 36247 INS CATH ABD/L-EXT ART 3RD: CPT | Mod: 51,LT,, | Performed by: RADIOLOGY

## 2021-06-25 PROCEDURE — 25000003 PHARM REV CODE 250: Performed by: RADIOLOGY

## 2021-06-25 PROCEDURE — 63600175 PHARM REV CODE 636 W HCPCS: Performed by: RADIOLOGY

## 2021-06-25 PROCEDURE — 25000003 PHARM REV CODE 250: Performed by: STUDENT IN AN ORGANIZED HEALTH CARE EDUCATION/TRAINING PROGRAM

## 2021-06-25 PROCEDURE — 75887 VEIN X-RAY LIVER W/O HEMODYN: CPT | Mod: TC | Performed by: RADIOLOGY

## 2021-06-25 PROCEDURE — 76937 PR  US GUIDE, VASCULAR ACCESS: ICD-10-PCS | Mod: 26,,, | Performed by: RADIOLOGY

## 2021-06-25 PROCEDURE — 76937 US GUIDE VASCULAR ACCESS: CPT | Mod: TC | Performed by: RADIOLOGY

## 2021-06-25 PROCEDURE — 37000009 HC ANESTHESIA EA ADD 15 MINS

## 2021-06-25 PROCEDURE — G0269 OCCLUSIVE DEVICE IN VEIN ART: HCPCS | Performed by: RADIOLOGY

## 2021-06-25 PROCEDURE — 63600175 PHARM REV CODE 636 W HCPCS: Performed by: NURSE ANESTHETIST, CERTIFIED REGISTERED

## 2021-06-25 PROCEDURE — 75887 VEIN X-RAY LIVER W/O HEMODYN: CPT | Mod: 26,,, | Performed by: RADIOLOGY

## 2021-06-25 PROCEDURE — 99152 MOD SED SAME PHYS/QHP 5/>YRS: CPT | Mod: ,,, | Performed by: RADIOLOGY

## 2021-06-25 PROCEDURE — 37000008 HC ANESTHESIA 1ST 15 MINUTES

## 2021-06-25 PROCEDURE — 75774 ARTERY X-RAY EACH VESSEL: CPT | Mod: TC,59 | Performed by: RADIOLOGY

## 2021-06-25 PROCEDURE — 99153 MOD SED SAME PHYS/QHP EA: CPT | Performed by: RADIOLOGY

## 2021-06-25 PROCEDURE — 75887 PR  PERCUT XHEPATIC PORTOGRAM: ICD-10-PCS | Mod: 26,,, | Performed by: RADIOLOGY

## 2021-06-25 PROCEDURE — C1894 INTRO/SHEATH, NON-LASER: HCPCS

## 2021-06-25 PROCEDURE — 75774 ARTERY X-RAY EACH VESSEL: CPT | Mod: 26,59,, | Performed by: RADIOLOGY

## 2021-06-25 PROCEDURE — 63600175 PHARM REV CODE 636 W HCPCS: Performed by: STUDENT IN AN ORGANIZED HEALTH CARE EDUCATION/TRAINING PROGRAM

## 2021-06-25 PROCEDURE — 76937 US GUIDE VASCULAR ACCESS: CPT | Mod: 26,,, | Performed by: RADIOLOGY

## 2021-06-25 PROCEDURE — 63600175 PHARM REV CODE 636 W HCPCS: Mod: JA | Performed by: SURGERY

## 2021-06-25 PROCEDURE — 25500020 PHARM REV CODE 255: Performed by: SURGERY

## 2021-06-25 PROCEDURE — 36247 INS CATH ABD/L-EXT ART 3RD: CPT | Mod: LT | Performed by: RADIOLOGY

## 2021-06-25 PROCEDURE — 99152 PR MOD CONSCIOUS SEDATION, SAME PHYS, 5+ YRS, FIRST 15 MIN: ICD-10-PCS | Mod: ,,, | Performed by: RADIOLOGY

## 2021-06-25 PROCEDURE — 75726 ARTERY X-RAYS ABDOMEN: CPT | Mod: TC,59 | Performed by: RADIOLOGY

## 2021-06-25 PROCEDURE — 36247 PR PLACE CATH SUBSUBSELECT ART,ABD/PEL: ICD-10-PCS | Mod: 51,LT,, | Performed by: RADIOLOGY

## 2021-06-25 PROCEDURE — C1769 GUIDE WIRE: HCPCS

## 2021-06-25 RX ORDER — METHOCARBAMOL 100 MG/ML
500 INJECTION, SOLUTION INTRAMUSCULAR; INTRAVENOUS EVERY 8 HOURS
Status: DISCONTINUED | OUTPATIENT
Start: 2021-06-25 | End: 2021-06-27

## 2021-06-25 RX ORDER — ALLOPURINOL 100 MG/1
300 TABLET ORAL DAILY
Status: COMPLETED | OUTPATIENT
Start: 2021-06-25 | End: 2021-06-26

## 2021-06-25 RX ORDER — SODIUM CHLORIDE 9 MG/ML
INJECTION, SOLUTION INTRAVENOUS CONTINUOUS
Status: DISCONTINUED | OUTPATIENT
Start: 2021-06-25 | End: 2021-06-27

## 2021-06-25 RX ORDER — FUROSEMIDE 10 MG/ML
20 INJECTION INTRAMUSCULAR; INTRAVENOUS ONCE
Status: COMPLETED | OUTPATIENT
Start: 2021-06-25 | End: 2021-06-25

## 2021-06-25 RX ORDER — ONDANSETRON 2 MG/ML
4 INJECTION INTRAMUSCULAR; INTRAVENOUS DAILY PRN
Status: DISCONTINUED | OUTPATIENT
Start: 2021-06-25 | End: 2021-06-26

## 2021-06-25 RX ORDER — DIPHENHYDRAMINE HCL 25 MG
25 CAPSULE ORAL EVERY 6 HOURS PRN
Status: DISCONTINUED | OUTPATIENT
Start: 2021-06-25 | End: 2021-06-29 | Stop reason: HOSPADM

## 2021-06-25 RX ORDER — MAGNESIUM SULFATE HEPTAHYDRATE 40 MG/ML
2 INJECTION, SOLUTION INTRAVENOUS ONCE
Status: COMPLETED | OUTPATIENT
Start: 2021-06-25 | End: 2021-06-25

## 2021-06-25 RX ORDER — HYDROMORPHONE HYDROCHLORIDE 1 MG/ML
INJECTION, SOLUTION INTRAMUSCULAR; INTRAVENOUS; SUBCUTANEOUS CODE/TRAUMA/SEDATION MEDICATION
Status: COMPLETED | OUTPATIENT
Start: 2021-06-25 | End: 2021-06-25

## 2021-06-25 RX ORDER — ONDANSETRON 2 MG/ML
4 INJECTION INTRAMUSCULAR; INTRAVENOUS EVERY 8 HOURS PRN
Status: DISCONTINUED | OUTPATIENT
Start: 2021-06-25 | End: 2021-06-26

## 2021-06-25 RX ORDER — DEXTROSE MONOHYDRATE, SODIUM CHLORIDE, AND POTASSIUM CHLORIDE 50; 1.49; 4.5 G/1000ML; G/1000ML; G/1000ML
INJECTION, SOLUTION INTRAVENOUS CONTINUOUS
Status: DISCONTINUED | OUTPATIENT
Start: 2021-06-25 | End: 2021-06-26

## 2021-06-25 RX ORDER — FAMOTIDINE 20 MG/1
20 TABLET, FILM COATED ORAL DAILY
Status: DISCONTINUED | OUTPATIENT
Start: 2021-06-26 | End: 2021-06-29 | Stop reason: HOSPADM

## 2021-06-25 RX ORDER — KETOROLAC TROMETHAMINE 30 MG/ML
10 INJECTION, SOLUTION INTRAMUSCULAR; INTRAVENOUS EVERY 6 HOURS
Status: DISPENSED | OUTPATIENT
Start: 2021-06-25 | End: 2021-06-28

## 2021-06-25 RX ORDER — METOPROLOL TARTRATE 25 MG/1
25 TABLET, FILM COATED ORAL 2 TIMES DAILY
Status: DISCONTINUED | OUTPATIENT
Start: 2021-06-25 | End: 2021-06-29 | Stop reason: HOSPADM

## 2021-06-25 RX ORDER — DEXAMETHASONE SODIUM PHOSPHATE 4 MG/ML
8 INJECTION, SOLUTION INTRA-ARTICULAR; INTRALESIONAL; INTRAMUSCULAR; INTRAVENOUS; SOFT TISSUE ONCE
Status: COMPLETED | OUTPATIENT
Start: 2021-06-25 | End: 2021-06-25

## 2021-06-25 RX ORDER — TRAMADOL HYDROCHLORIDE 50 MG/1
50 TABLET ORAL EVERY 6 HOURS PRN
Status: DISCONTINUED | OUTPATIENT
Start: 2021-06-25 | End: 2021-06-29 | Stop reason: HOSPADM

## 2021-06-25 RX ORDER — MIDAZOLAM HYDROCHLORIDE 1 MG/ML
INJECTION, SOLUTION INTRAMUSCULAR; INTRAVENOUS
Status: DISCONTINUED | OUTPATIENT
Start: 2021-06-25 | End: 2021-06-25

## 2021-06-25 RX ORDER — LIDOCAINE HYDROCHLORIDE 20 MG/ML
INJECTION INTRAVENOUS
Status: DISCONTINUED | OUTPATIENT
Start: 2021-06-25 | End: 2021-06-25

## 2021-06-25 RX ORDER — DIPHENHYDRAMINE HYDROCHLORIDE 50 MG/ML
50 INJECTION INTRAMUSCULAR; INTRAVENOUS ONCE
Status: COMPLETED | OUTPATIENT
Start: 2021-06-25 | End: 2021-06-25

## 2021-06-25 RX ORDER — HYDRALAZINE HYDROCHLORIDE 20 MG/ML
10 INJECTION INTRAMUSCULAR; INTRAVENOUS EVERY 8 HOURS PRN
Status: DISCONTINUED | OUTPATIENT
Start: 2021-06-25 | End: 2021-06-29 | Stop reason: HOSPADM

## 2021-06-25 RX ORDER — FENTANYL CITRATE 50 UG/ML
INJECTION, SOLUTION INTRAMUSCULAR; INTRAVENOUS
Status: DISCONTINUED | OUTPATIENT
Start: 2021-06-25 | End: 2021-06-25

## 2021-06-25 RX ORDER — FAMOTIDINE 20 MG/1
20 TABLET, FILM COATED ORAL EVERY 12 HOURS
Status: DISCONTINUED | OUTPATIENT
Start: 2021-06-25 | End: 2021-06-25

## 2021-06-25 RX ORDER — TRAMADOL HYDROCHLORIDE AND ACETAMINOPHEN 37.5; 325 MG/1; MG/1
1 TABLET, FILM COATED ORAL EVERY 4 HOURS PRN
Status: DISCONTINUED | OUTPATIENT
Start: 2021-06-25 | End: 2021-06-29 | Stop reason: HOSPADM

## 2021-06-25 RX ORDER — SODIUM CHLORIDE 9 MG/ML
INJECTION, SOLUTION INTRAVENOUS ONCE
Status: COMPLETED | OUTPATIENT
Start: 2021-06-25 | End: 2021-06-25

## 2021-06-25 RX ORDER — IBUPROFEN 400 MG/1
400 TABLET ORAL EVERY 4 HOURS PRN
Status: DISCONTINUED | OUTPATIENT
Start: 2021-06-25 | End: 2021-06-25

## 2021-06-25 RX ORDER — SCOLOPAMINE TRANSDERMAL SYSTEM 1 MG/1
1 PATCH, EXTENDED RELEASE TRANSDERMAL ONCE
Status: COMPLETED | OUTPATIENT
Start: 2021-06-25 | End: 2021-06-28

## 2021-06-25 RX ORDER — PROPOFOL 10 MG/ML
VIAL (ML) INTRAVENOUS CONTINUOUS PRN
Status: DISCONTINUED | OUTPATIENT
Start: 2021-06-25 | End: 2021-06-25

## 2021-06-25 RX ORDER — OXYCODONE HYDROCHLORIDE 5 MG/1
15 TABLET ORAL EVERY 4 HOURS PRN
Status: DISCONTINUED | OUTPATIENT
Start: 2021-06-25 | End: 2021-06-29 | Stop reason: HOSPADM

## 2021-06-25 RX ORDER — PROCHLORPERAZINE EDISYLATE 5 MG/ML
INJECTION INTRAMUSCULAR; INTRAVENOUS CODE/TRAUMA/SEDATION MEDICATION
Status: COMPLETED | OUTPATIENT
Start: 2021-06-25 | End: 2021-06-25

## 2021-06-25 RX ORDER — OXYCODONE HYDROCHLORIDE 5 MG/1
5 TABLET ORAL EVERY 4 HOURS PRN
Status: DISCONTINUED | OUTPATIENT
Start: 2021-06-25 | End: 2021-06-25

## 2021-06-25 RX ORDER — HYDRALAZINE HYDROCHLORIDE 20 MG/ML
INJECTION INTRAMUSCULAR; INTRAVENOUS CODE/TRAUMA/SEDATION MEDICATION
Status: COMPLETED | OUTPATIENT
Start: 2021-06-25 | End: 2021-06-25

## 2021-06-25 RX ORDER — PROPOFOL 10 MG/ML
VIAL (ML) INTRAVENOUS
Status: DISCONTINUED | OUTPATIENT
Start: 2021-06-25 | End: 2021-06-25

## 2021-06-25 RX ADMIN — SCOPALAMINE 1 PATCH: 1 PATCH, EXTENDED RELEASE TRANSDERMAL at 08:06

## 2021-06-25 RX ADMIN — ETHIODIZED OIL 10 ML: 480 INJECTION INTRA-ARTERIAL; INTRALYMPHATIC; INTRAUTERINE at 10:06

## 2021-06-25 RX ADMIN — AMPICILLIN SODIUM AND SULBACTAM SODIUM 3 G: 2; 1 INJECTION, POWDER, FOR SOLUTION INTRAMUSCULAR; INTRAVENOUS at 08:06

## 2021-06-25 RX ADMIN — OXYCODONE HYDROCHLORIDE 5 MG: 5 TABLET ORAL at 04:06

## 2021-06-25 RX ADMIN — POTASSIUM CHLORIDE, DEXTROSE MONOHYDRATE AND SODIUM CHLORIDE: 150; 5; 450 INJECTION, SOLUTION INTRAVENOUS at 03:06

## 2021-06-25 RX ADMIN — OCTREOTIDE ACETATE 500 MCG: 500 INJECTION, SOLUTION INTRAVENOUS; SUBCUTANEOUS at 08:06

## 2021-06-25 RX ADMIN — DEXAMETHASONE SODIUM PHOSPHATE 8 MG: 4 INJECTION, SOLUTION INTRAMUSCULAR; INTRAVENOUS at 12:06

## 2021-06-25 RX ADMIN — LIDOCAINE HYDROCHLORIDE 100 MG: 20 INJECTION, SOLUTION INTRAVENOUS at 09:06

## 2021-06-25 RX ADMIN — FENTANYL CITRATE 50 MCG: 50 INJECTION, SOLUTION INTRAMUSCULAR; INTRAVENOUS at 11:06

## 2021-06-25 RX ADMIN — OCTREOTIDE ACETATE 250 MCG/HR: 1000 INJECTION, SOLUTION INTRAVENOUS; SUBCUTANEOUS at 03:06

## 2021-06-25 RX ADMIN — DIPHENHYDRAMINE HYDROCHLORIDE 50 MG: 50 INJECTION, SOLUTION INTRAMUSCULAR; INTRAVENOUS at 09:06

## 2021-06-25 RX ADMIN — KETOROLAC TROMETHAMINE 10 MG: 30 INJECTION, SOLUTION INTRAMUSCULAR; INTRAVENOUS at 05:06

## 2021-06-25 RX ADMIN — ONDANSETRON 16 MG: 2 INJECTION INTRAMUSCULAR; INTRAVENOUS at 08:06

## 2021-06-25 RX ADMIN — ONDANSETRON 4 MG: 2 INJECTION INTRAMUSCULAR; INTRAVENOUS at 09:06

## 2021-06-25 RX ADMIN — POTASSIUM CHLORIDE, DEXTROSE MONOHYDRATE AND SODIUM CHLORIDE: 150; 5; 450 INJECTION, SOLUTION INTRAVENOUS at 08:06

## 2021-06-25 RX ADMIN — PROCHLORPERAZINE EDISYLATE 10 MG: 5 INJECTION INTRAMUSCULAR; INTRAVENOUS at 12:06

## 2021-06-25 RX ADMIN — TRAMADOL HYDROCHLORIDE AND ACETAMINOPHEN 1 TABLET: 37.5; 325 TABLET ORAL at 07:06

## 2021-06-25 RX ADMIN — FUROSEMIDE 20 MG: 10 INJECTION, SOLUTION INTRAVENOUS at 01:06

## 2021-06-25 RX ADMIN — PROPOFOL 100 MCG/KG/MIN: 10 INJECTION, EMULSION INTRAVENOUS at 09:06

## 2021-06-25 RX ADMIN — SODIUM CHLORIDE: 0.9 INJECTION, SOLUTION INTRAVENOUS at 03:06

## 2021-06-25 RX ADMIN — MIDAZOLAM 2 MG: 1 INJECTION INTRAMUSCULAR; INTRAVENOUS at 09:06

## 2021-06-25 RX ADMIN — IOHEXOL 50 MG: 350 INJECTION, SOLUTION INTRAVENOUS at 10:06

## 2021-06-25 RX ADMIN — AMPICILLIN SODIUM AND SULBACTAM SODIUM 3 G: 2; 1 INJECTION, POWDER, FOR SOLUTION INTRAMUSCULAR; INTRAVENOUS at 04:06

## 2021-06-25 RX ADMIN — MAGNESIUM SULFATE 2 G: 2 INJECTION INTRAVENOUS at 08:06

## 2021-06-25 RX ADMIN — FENTANYL CITRATE 25 MCG: 50 INJECTION, SOLUTION INTRAMUSCULAR; INTRAVENOUS at 10:06

## 2021-06-25 RX ADMIN — HYDRALAZINE HYDROCHLORIDE 10 MG: 20 INJECTION INTRAMUSCULAR; INTRAVENOUS at 12:06

## 2021-06-25 RX ADMIN — OXYCODONE HYDROCHLORIDE 5 MG: 5 TABLET ORAL at 08:06

## 2021-06-25 RX ADMIN — HYDROMORPHONE HYDROCHLORIDE 0.5 MG: 1 INJECTION, SOLUTION INTRAMUSCULAR; INTRAVENOUS; SUBCUTANEOUS at 01:06

## 2021-06-25 RX ADMIN — METHOCARBAMOL 500 MG: 100 INJECTION INTRAMUSCULAR; INTRAVENOUS at 10:06

## 2021-06-25 RX ADMIN — HYDROMORPHONE HYDROCHLORIDE 0.5 MG: 1 INJECTION, SOLUTION INTRAMUSCULAR; INTRAVENOUS; SUBCUTANEOUS at 12:06

## 2021-06-25 RX ADMIN — ALLOPURINOL 300 MG: 100 TABLET ORAL at 01:06

## 2021-06-25 RX ADMIN — METOPROLOL TARTRATE 25 MG: 25 TABLET, FILM COATED ORAL at 10:06

## 2021-06-25 RX ADMIN — SODIUM CHLORIDE: 0.9 INJECTION, SOLUTION INTRAVENOUS at 08:06

## 2021-06-25 RX ADMIN — FENTANYL CITRATE 50 MCG: 50 INJECTION, SOLUTION INTRAMUSCULAR; INTRAVENOUS at 10:06

## 2021-06-25 RX ADMIN — FENTANYL CITRATE 50 MCG: 50 INJECTION, SOLUTION INTRAMUSCULAR; INTRAVENOUS at 09:06

## 2021-06-25 RX ADMIN — OCTREOTIDE ACETATE 250 MCG/HR: 1000 INJECTION, SOLUTION INTRAVENOUS; SUBCUTANEOUS at 09:06

## 2021-06-25 RX ADMIN — PROPOFOL 40 MG: 10 INJECTION, EMULSION INTRAVENOUS at 09:06

## 2021-06-26 LAB
ALBUMIN SERPL BCP-MCNC: 3.6 G/DL (ref 3.5–5.2)
ALP SERPL-CCNC: 179 U/L (ref 55–135)
ALT SERPL W/O P-5'-P-CCNC: 155 U/L (ref 10–44)
ANION GAP SERPL CALC-SCNC: 13 MMOL/L (ref 8–16)
AST SERPL-CCNC: 489 U/L (ref 10–40)
BASOPHILS # BLD AUTO: 0.03 K/UL (ref 0–0.2)
BASOPHILS NFR BLD: 0.2 % (ref 0–1.9)
BILIRUB SERPL-MCNC: 1.1 MG/DL (ref 0.1–1)
BUN SERPL-MCNC: 14 MG/DL (ref 8–23)
CALCIUM SERPL-MCNC: 8.8 MG/DL (ref 8.7–10.5)
CHLORIDE SERPL-SCNC: 105 MMOL/L (ref 95–110)
CO2 SERPL-SCNC: 25 MMOL/L (ref 23–29)
CREAT SERPL-MCNC: 1.2 MG/DL (ref 0.5–1.4)
DIFFERENTIAL METHOD: ABNORMAL
EOSINOPHIL # BLD AUTO: 0 K/UL (ref 0–0.5)
EOSINOPHIL NFR BLD: 0 % (ref 0–8)
ERYTHROCYTE [DISTWIDTH] IN BLOOD BY AUTOMATED COUNT: 13.7 % (ref 11.5–14.5)
EST. GFR  (AFRICAN AMERICAN): 54 ML/MIN/1.73 M^2
EST. GFR  (NON AFRICAN AMERICAN): 47 ML/MIN/1.73 M^2
GLUCOSE SERPL-MCNC: 163 MG/DL (ref 70–110)
HCT VFR BLD AUTO: 37.9 % (ref 37–48.5)
HGB BLD-MCNC: 12.3 G/DL (ref 12–16)
IMM GRANULOCYTES # BLD AUTO: 0.19 K/UL (ref 0–0.04)
IMM GRANULOCYTES NFR BLD AUTO: 1.6 % (ref 0–0.5)
LYMPHOCYTES # BLD AUTO: 0.4 K/UL (ref 1–4.8)
LYMPHOCYTES NFR BLD: 3.1 % (ref 18–48)
MAGNESIUM SERPL-MCNC: 1.5 MG/DL (ref 1.6–2.6)
MCH RBC QN AUTO: 28.9 PG (ref 27–31)
MCHC RBC AUTO-ENTMCNC: 32.5 G/DL (ref 32–36)
MCV RBC AUTO: 89 FL (ref 82–98)
MONOCYTES # BLD AUTO: 1 K/UL (ref 0.3–1)
MONOCYTES NFR BLD: 8 % (ref 4–15)
NEUTROPHILS # BLD AUTO: 10.5 K/UL (ref 1.8–7.7)
NEUTROPHILS NFR BLD: 87.1 % (ref 38–73)
NRBC BLD-RTO: 0 /100 WBC
PHOSPHATE SERPL-MCNC: 3.1 MG/DL (ref 2.7–4.5)
PLATELET # BLD AUTO: 186 K/UL (ref 150–450)
PMV BLD AUTO: 10.7 FL (ref 9.2–12.9)
POTASSIUM SERPL-SCNC: 3.5 MMOL/L (ref 3.5–5.1)
PROT SERPL-MCNC: 7.8 G/DL (ref 6–8.4)
RBC # BLD AUTO: 4.26 M/UL (ref 4–5.4)
SODIUM SERPL-SCNC: 143 MMOL/L (ref 136–145)
WBC # BLD AUTO: 12.06 K/UL (ref 3.9–12.7)

## 2021-06-26 PROCEDURE — 63600175 PHARM REV CODE 636 W HCPCS: Performed by: STUDENT IN AN ORGANIZED HEALTH CARE EDUCATION/TRAINING PROGRAM

## 2021-06-26 PROCEDURE — 63600175 PHARM REV CODE 636 W HCPCS: Performed by: SURGERY

## 2021-06-26 PROCEDURE — 25000003 PHARM REV CODE 250: Performed by: STUDENT IN AN ORGANIZED HEALTH CARE EDUCATION/TRAINING PROGRAM

## 2021-06-26 PROCEDURE — 85025 COMPLETE CBC W/AUTO DIFF WBC: CPT | Performed by: STUDENT IN AN ORGANIZED HEALTH CARE EDUCATION/TRAINING PROGRAM

## 2021-06-26 PROCEDURE — 80053 COMPREHEN METABOLIC PANEL: CPT | Performed by: STUDENT IN AN ORGANIZED HEALTH CARE EDUCATION/TRAINING PROGRAM

## 2021-06-26 PROCEDURE — 25000003 PHARM REV CODE 250: Performed by: NURSE ANESTHETIST, CERTIFIED REGISTERED

## 2021-06-26 PROCEDURE — 25000003 PHARM REV CODE 250: Performed by: SURGERY

## 2021-06-26 PROCEDURE — 63600175 PHARM REV CODE 636 W HCPCS: Mod: JA | Performed by: SURGERY

## 2021-06-26 PROCEDURE — 83735 ASSAY OF MAGNESIUM: CPT | Performed by: STUDENT IN AN ORGANIZED HEALTH CARE EDUCATION/TRAINING PROGRAM

## 2021-06-26 PROCEDURE — 36415 COLL VENOUS BLD VENIPUNCTURE: CPT | Performed by: STUDENT IN AN ORGANIZED HEALTH CARE EDUCATION/TRAINING PROGRAM

## 2021-06-26 PROCEDURE — 84100 ASSAY OF PHOSPHORUS: CPT | Performed by: STUDENT IN AN ORGANIZED HEALTH CARE EDUCATION/TRAINING PROGRAM

## 2021-06-26 RX ORDER — ATORVASTATIN CALCIUM 40 MG/1
40 TABLET, FILM COATED ORAL NIGHTLY
Status: DISCONTINUED | OUTPATIENT
Start: 2021-06-26 | End: 2021-06-29 | Stop reason: HOSPADM

## 2021-06-26 RX ORDER — AMLODIPINE BESYLATE 5 MG/1
5 TABLET ORAL DAILY
Status: DISCONTINUED | OUTPATIENT
Start: 2021-06-26 | End: 2021-06-29 | Stop reason: HOSPADM

## 2021-06-26 RX ORDER — ONDANSETRON 2 MG/ML
4 INJECTION INTRAMUSCULAR; INTRAVENOUS EVERY 6 HOURS PRN
Status: DISCONTINUED | OUTPATIENT
Start: 2021-06-26 | End: 2021-06-26

## 2021-06-26 RX ORDER — LOSARTAN POTASSIUM 50 MG/1
100 TABLET ORAL DAILY
Status: DISCONTINUED | OUTPATIENT
Start: 2021-06-26 | End: 2021-06-29 | Stop reason: HOSPADM

## 2021-06-26 RX ORDER — ONDANSETRON 2 MG/ML
8 INJECTION INTRAMUSCULAR; INTRAVENOUS EVERY 6 HOURS PRN
Status: DISCONTINUED | OUTPATIENT
Start: 2021-06-26 | End: 2021-06-29 | Stop reason: HOSPADM

## 2021-06-26 RX ADMIN — SODIUM CHLORIDE: 0.9 INJECTION, SOLUTION INTRAVENOUS at 08:06

## 2021-06-26 RX ADMIN — ALLOPURINOL 300 MG: 100 TABLET ORAL at 08:06

## 2021-06-26 RX ADMIN — SODIUM CHLORIDE: 0.9 INJECTION, SOLUTION INTRAVENOUS at 11:06

## 2021-06-26 RX ADMIN — ATORVASTATIN CALCIUM 40 MG: 40 TABLET, FILM COATED ORAL at 08:06

## 2021-06-26 RX ADMIN — KETOROLAC TROMETHAMINE 10 MG: 30 INJECTION, SOLUTION INTRAMUSCULAR; INTRAVENOUS at 12:06

## 2021-06-26 RX ADMIN — OXYCODONE 15 MG: 5 TABLET ORAL at 06:06

## 2021-06-26 RX ADMIN — METOPROLOL TARTRATE 25 MG: 25 TABLET, FILM COATED ORAL at 08:06

## 2021-06-26 RX ADMIN — HYDRALAZINE HYDROCHLORIDE 10 MG: 20 INJECTION INTRAMUSCULAR; INTRAVENOUS at 12:06

## 2021-06-26 RX ADMIN — ONDANSETRON 4 MG: 2 INJECTION INTRAMUSCULAR; INTRAVENOUS at 02:06

## 2021-06-26 RX ADMIN — LOSARTAN POTASSIUM 100 MG: 50 TABLET, FILM COATED ORAL at 08:06

## 2021-06-26 RX ADMIN — OCTREOTIDE ACETATE 250 MCG/HR: 1000 INJECTION, SOLUTION INTRAVENOUS; SUBCUTANEOUS at 06:06

## 2021-06-26 RX ADMIN — TRAMADOL HYDROCHLORIDE AND ACETAMINOPHEN 1 TABLET: 37.5; 325 TABLET ORAL at 08:06

## 2021-06-26 RX ADMIN — OXYCODONE 15 MG: 5 TABLET ORAL at 12:06

## 2021-06-26 RX ADMIN — TRAMADOL HYDROCHLORIDE 50 MG: 50 TABLET, FILM COATED ORAL at 08:06

## 2021-06-26 RX ADMIN — AMLODIPINE BESYLATE 5 MG: 5 TABLET ORAL at 08:06

## 2021-06-26 RX ADMIN — AMPICILLIN SODIUM AND SULBACTAM SODIUM 3 G: 2; 1 INJECTION, POWDER, FOR SOLUTION INTRAMUSCULAR; INTRAVENOUS at 03:06

## 2021-06-26 RX ADMIN — ONDANSETRON 4 MG: 2 INJECTION INTRAMUSCULAR; INTRAVENOUS at 08:06

## 2021-06-26 RX ADMIN — OXYCODONE 15 MG: 5 TABLET ORAL at 04:06

## 2021-06-26 RX ADMIN — OXYCODONE 15 MG: 5 TABLET ORAL at 08:06

## 2021-06-26 RX ADMIN — KETOROLAC TROMETHAMINE 10 MG: 30 INJECTION, SOLUTION INTRAMUSCULAR; INTRAVENOUS at 11:06

## 2021-06-26 RX ADMIN — AMPICILLIN SODIUM AND SULBACTAM SODIUM 3 G: 2; 1 INJECTION, POWDER, FOR SOLUTION INTRAMUSCULAR; INTRAVENOUS at 10:06

## 2021-06-26 RX ADMIN — FAMOTIDINE 20 MG: 20 TABLET ORAL at 08:06

## 2021-06-27 PROCEDURE — 94799 UNLISTED PULMONARY SVC/PX: CPT

## 2021-06-27 PROCEDURE — 25000003 PHARM REV CODE 250: Performed by: SURGERY

## 2021-06-27 PROCEDURE — 63600175 PHARM REV CODE 636 W HCPCS: Performed by: STUDENT IN AN ORGANIZED HEALTH CARE EDUCATION/TRAINING PROGRAM

## 2021-06-27 PROCEDURE — 99900035 HC TECH TIME PER 15 MIN (STAT)

## 2021-06-27 PROCEDURE — 25000003 PHARM REV CODE 250: Performed by: STUDENT IN AN ORGANIZED HEALTH CARE EDUCATION/TRAINING PROGRAM

## 2021-06-27 PROCEDURE — 25000003 PHARM REV CODE 250: Performed by: NURSE ANESTHETIST, CERTIFIED REGISTERED

## 2021-06-27 PROCEDURE — G0378 HOSPITAL OBSERVATION PER HR: HCPCS

## 2021-06-27 PROCEDURE — 96374 THER/PROPH/DIAG INJ IV PUSH: CPT

## 2021-06-27 PROCEDURE — 94761 N-INVAS EAR/PLS OXIMETRY MLT: CPT

## 2021-06-27 RX ORDER — POLYETHYLENE GLYCOL 3350 17 G/17G
17 POWDER, FOR SOLUTION ORAL DAILY
Status: DISCONTINUED | OUTPATIENT
Start: 2021-06-27 | End: 2021-06-29 | Stop reason: HOSPADM

## 2021-06-27 RX ORDER — METHOCARBAMOL 100 MG/ML
500 INJECTION, SOLUTION INTRAMUSCULAR; INTRAVENOUS EVERY 8 HOURS
Status: DISCONTINUED | OUTPATIENT
Start: 2021-06-27 | End: 2021-06-29 | Stop reason: HOSPADM

## 2021-06-27 RX ADMIN — KETOROLAC TROMETHAMINE 10 MG: 30 INJECTION, SOLUTION INTRAMUSCULAR; INTRAVENOUS at 11:06

## 2021-06-27 RX ADMIN — AMLODIPINE BESYLATE 5 MG: 5 TABLET ORAL at 08:06

## 2021-06-27 RX ADMIN — TRAMADOL HYDROCHLORIDE 50 MG: 50 TABLET, FILM COATED ORAL at 11:06

## 2021-06-27 RX ADMIN — METOPROLOL TARTRATE 25 MG: 25 TABLET, FILM COATED ORAL at 08:06

## 2021-06-27 RX ADMIN — ONDANSETRON 8 MG: 2 INJECTION INTRAMUSCULAR; INTRAVENOUS at 04:06

## 2021-06-27 RX ADMIN — LOSARTAN POTASSIUM 100 MG: 50 TABLET, FILM COATED ORAL at 08:06

## 2021-06-27 RX ADMIN — KETOROLAC TROMETHAMINE 10 MG: 30 INJECTION, SOLUTION INTRAMUSCULAR; INTRAVENOUS at 05:06

## 2021-06-27 RX ADMIN — POLYETHYLENE GLYCOL 3350 17 G: 17 POWDER, FOR SOLUTION ORAL at 11:06

## 2021-06-27 RX ADMIN — FAMOTIDINE 20 MG: 20 TABLET ORAL at 08:06

## 2021-06-27 RX ADMIN — OXYCODONE 15 MG: 5 TABLET ORAL at 12:06

## 2021-06-27 RX ADMIN — ATORVASTATIN CALCIUM 40 MG: 40 TABLET, FILM COATED ORAL at 08:06

## 2021-06-27 RX ADMIN — OXYCODONE 15 MG: 5 TABLET ORAL at 04:06

## 2021-06-27 RX ADMIN — OXYCODONE 15 MG: 5 TABLET ORAL at 08:06

## 2021-06-27 RX ADMIN — OXYCODONE 15 MG: 5 TABLET ORAL at 03:06

## 2021-06-28 ENCOUNTER — OUTPATIENT CASE MANAGEMENT (OUTPATIENT)
Dept: ADMINISTRATIVE | Facility: OTHER | Age: 71
End: 2021-06-28

## 2021-06-28 DIAGNOSIS — C7B.02 SECONDARY MALIGNANT CARCINOID TUMOR OF LIVER: Primary | ICD-10-CM

## 2021-06-28 PROCEDURE — 25000003 PHARM REV CODE 250: Performed by: STUDENT IN AN ORGANIZED HEALTH CARE EDUCATION/TRAINING PROGRAM

## 2021-06-28 PROCEDURE — 96376 TX/PRO/DX INJ SAME DRUG ADON: CPT

## 2021-06-28 PROCEDURE — 94799 UNLISTED PULMONARY SVC/PX: CPT

## 2021-06-28 PROCEDURE — 25000003 PHARM REV CODE 250: Performed by: SURGERY

## 2021-06-28 PROCEDURE — 63600175 PHARM REV CODE 636 W HCPCS: Performed by: STUDENT IN AN ORGANIZED HEALTH CARE EDUCATION/TRAINING PROGRAM

## 2021-06-28 PROCEDURE — G0378 HOSPITAL OBSERVATION PER HR: HCPCS

## 2021-06-28 PROCEDURE — 25000003 PHARM REV CODE 250: Performed by: NURSE ANESTHETIST, CERTIFIED REGISTERED

## 2021-06-28 RX ORDER — CIPROFLOXACIN 500 MG/1
500 TABLET ORAL EVERY 12 HOURS
Status: DISCONTINUED | OUTPATIENT
Start: 2021-06-28 | End: 2021-06-28

## 2021-06-28 RX ORDER — CIPROFLOXACIN 500 MG/1
500 TABLET ORAL EVERY 12 HOURS
Status: DISCONTINUED | OUTPATIENT
Start: 2021-06-28 | End: 2021-06-29 | Stop reason: HOSPADM

## 2021-06-28 RX ORDER — CIPROFLOXACIN 500 MG/1
500 TABLET ORAL 2 TIMES DAILY
Qty: 6 TABLET | Refills: 0 | Status: SHIPPED | OUTPATIENT
Start: 2021-06-28 | End: 2021-07-01

## 2021-06-28 RX ORDER — CLOTRIMAZOLE 10 MG/1
10 LOZENGE ORAL; TOPICAL
Status: DISCONTINUED | OUTPATIENT
Start: 2021-06-28 | End: 2021-06-29 | Stop reason: HOSPADM

## 2021-06-28 RX ORDER — IRON,CARB/VIT C/VIT B12/FOLIC 100-250-1
1 TABLET ORAL DAILY
Qty: 30 TABLET | Refills: 0 | Status: SHIPPED | OUTPATIENT
Start: 2021-06-28 | End: 2022-02-02

## 2021-06-28 RX ORDER — ONDANSETRON 4 MG/1
4 TABLET, ORALLY DISINTEGRATING ORAL EVERY 6 HOURS PRN
Status: DISCONTINUED | OUTPATIENT
Start: 2021-06-28 | End: 2021-06-29 | Stop reason: HOSPADM

## 2021-06-28 RX ORDER — TRAMADOL HYDROCHLORIDE AND ACETAMINOPHEN 37.5; 325 MG/1; MG/1
1 TABLET, FILM COATED ORAL EVERY 6 HOURS PRN
Qty: 12 TABLET | Refills: 0 | Status: ON HOLD | OUTPATIENT
Start: 2021-06-28 | End: 2021-07-09 | Stop reason: HOSPADM

## 2021-06-28 RX ORDER — CIPROFLOXACIN 2 MG/ML
400 INJECTION, SOLUTION INTRAVENOUS
Status: DISCONTINUED | OUTPATIENT
Start: 2021-06-28 | End: 2021-06-28

## 2021-06-28 RX ORDER — ONDANSETRON 4 MG/1
4 TABLET, FILM COATED ORAL EVERY 6 HOURS PRN
Qty: 12 TABLET | Refills: 0 | Status: ON HOLD | OUTPATIENT
Start: 2021-06-28 | End: 2021-10-07 | Stop reason: HOSPADM

## 2021-06-28 RX ADMIN — CIPROFLOXACIN 500 MG: 500 TABLET, FILM COATED ORAL at 01:06

## 2021-06-28 RX ADMIN — METOPROLOL TARTRATE 25 MG: 25 TABLET, FILM COATED ORAL at 09:06

## 2021-06-28 RX ADMIN — ATORVASTATIN CALCIUM 40 MG: 40 TABLET, FILM COATED ORAL at 08:06

## 2021-06-28 RX ADMIN — KETOROLAC TROMETHAMINE 10 MG: 30 INJECTION, SOLUTION INTRAMUSCULAR; INTRAVENOUS at 05:06

## 2021-06-28 RX ADMIN — OXYCODONE 15 MG: 5 TABLET ORAL at 02:06

## 2021-06-28 RX ADMIN — CLOTRIMAZOLE 10 MG: 10 LOZENGE ORAL; TOPICAL at 01:06

## 2021-06-28 RX ADMIN — POLYETHYLENE GLYCOL 3350 17 G: 17 POWDER, FOR SOLUTION ORAL at 09:06

## 2021-06-28 RX ADMIN — METOPROLOL TARTRATE 25 MG: 25 TABLET, FILM COATED ORAL at 08:06

## 2021-06-28 RX ADMIN — LOSARTAN POTASSIUM 100 MG: 50 TABLET, FILM COATED ORAL at 09:06

## 2021-06-28 RX ADMIN — CIPROFLOXACIN 500 MG: 500 TABLET, FILM COATED ORAL at 08:06

## 2021-06-28 RX ADMIN — FAMOTIDINE 20 MG: 20 TABLET ORAL at 09:06

## 2021-06-28 RX ADMIN — CLOTRIMAZOLE 10 MG: 10 LOZENGE ORAL; TOPICAL at 09:06

## 2021-06-28 RX ADMIN — OXYCODONE 15 MG: 5 TABLET ORAL at 05:06

## 2021-06-28 RX ADMIN — CLOTRIMAZOLE 10 MG: 10 LOZENGE ORAL; TOPICAL at 05:06

## 2021-06-28 RX ADMIN — TRAMADOL HYDROCHLORIDE AND ACETAMINOPHEN 1 TABLET: 37.5; 325 TABLET ORAL at 12:06

## 2021-06-28 RX ADMIN — AMLODIPINE BESYLATE 5 MG: 5 TABLET ORAL at 09:06

## 2021-06-29 VITALS
RESPIRATION RATE: 22 BRPM | TEMPERATURE: 99 F | OXYGEN SATURATION: 97 % | WEIGHT: 182.31 LBS | SYSTOLIC BLOOD PRESSURE: 116 MMHG | HEIGHT: 66 IN | HEART RATE: 78 BPM | DIASTOLIC BLOOD PRESSURE: 57 MMHG | BODY MASS INDEX: 29.3 KG/M2

## 2021-06-29 LAB
ALBUMIN SERPL BCP-MCNC: 2.6 G/DL (ref 3.5–5.2)
ALP SERPL-CCNC: 160 U/L (ref 55–135)
ALT SERPL W/O P-5'-P-CCNC: 104 U/L (ref 10–44)
ANION GAP SERPL CALC-SCNC: 10 MMOL/L (ref 8–16)
AST SERPL-CCNC: 29 U/L (ref 10–40)
BILIRUB SERPL-MCNC: 0.7 MG/DL (ref 0.1–1)
BUN SERPL-MCNC: 38 MG/DL (ref 8–23)
CALCIUM SERPL-MCNC: 8.9 MG/DL (ref 8.7–10.5)
CHLORIDE SERPL-SCNC: 104 MMOL/L (ref 95–110)
CO2 SERPL-SCNC: 26 MMOL/L (ref 23–29)
CREAT SERPL-MCNC: 2 MG/DL (ref 0.5–1.4)
EST. GFR  (AFRICAN AMERICAN): 29 ML/MIN/1.73 M^2
EST. GFR  (NON AFRICAN AMERICAN): 25 ML/MIN/1.73 M^2
GLUCOSE SERPL-MCNC: 165 MG/DL (ref 70–110)
POTASSIUM SERPL-SCNC: 3.3 MMOL/L (ref 3.5–5.1)
PROT SERPL-MCNC: 6.5 G/DL (ref 6–8.4)
SODIUM SERPL-SCNC: 140 MMOL/L (ref 136–145)

## 2021-06-29 PROCEDURE — G0378 HOSPITAL OBSERVATION PER HR: HCPCS

## 2021-06-29 PROCEDURE — 25000003 PHARM REV CODE 250: Performed by: SURGERY

## 2021-06-29 PROCEDURE — 80053 COMPREHEN METABOLIC PANEL: CPT | Performed by: SURGERY

## 2021-06-29 PROCEDURE — 25000003 PHARM REV CODE 250: Performed by: STUDENT IN AN ORGANIZED HEALTH CARE EDUCATION/TRAINING PROGRAM

## 2021-06-29 PROCEDURE — 25000003 PHARM REV CODE 250: Performed by: NURSE ANESTHETIST, CERTIFIED REGISTERED

## 2021-06-29 PROCEDURE — 94799 UNLISTED PULMONARY SVC/PX: CPT

## 2021-06-29 PROCEDURE — 36415 COLL VENOUS BLD VENIPUNCTURE: CPT | Performed by: SURGERY

## 2021-06-29 PROCEDURE — 99900035 HC TECH TIME PER 15 MIN (STAT)

## 2021-06-29 RX ORDER — CLOTRIMAZOLE 10 MG/1
10 LOZENGE ORAL; TOPICAL
Status: DISCONTINUED | OUTPATIENT
Start: 2021-06-29 | End: 2021-06-29

## 2021-06-29 RX ORDER — CIPROFLOXACIN 500 MG/1
500 TABLET ORAL EVERY 12 HOURS
Qty: 10 TABLET | Refills: 0 | Status: ON HOLD | OUTPATIENT
Start: 2021-06-29 | End: 2021-07-09 | Stop reason: HOSPADM

## 2021-06-29 RX ADMIN — POLYETHYLENE GLYCOL 3350 17 G: 17 POWDER, FOR SOLUTION ORAL at 10:06

## 2021-06-29 RX ADMIN — METOPROLOL TARTRATE 25 MG: 25 TABLET, FILM COATED ORAL at 10:06

## 2021-06-29 RX ADMIN — OXYCODONE 15 MG: 5 TABLET ORAL at 10:06

## 2021-06-29 RX ADMIN — CLOTRIMAZOLE 10 MG: 10 LOZENGE ORAL; TOPICAL at 05:06

## 2021-06-29 RX ADMIN — CLOTRIMAZOLE 10 MG: 10 LOZENGE ORAL; TOPICAL at 03:06

## 2021-06-29 RX ADMIN — LOSARTAN POTASSIUM 100 MG: 50 TABLET, FILM COATED ORAL at 10:06

## 2021-06-29 RX ADMIN — FAMOTIDINE 20 MG: 20 TABLET ORAL at 10:06

## 2021-06-29 RX ADMIN — CLOTRIMAZOLE 10 MG: 10 LOZENGE ORAL; TOPICAL at 06:06

## 2021-06-29 RX ADMIN — CIPROFLOXACIN 500 MG: 500 TABLET, FILM COATED ORAL at 10:06

## 2021-06-29 RX ADMIN — AMLODIPINE BESYLATE 5 MG: 5 TABLET ORAL at 10:06

## 2021-06-29 RX ADMIN — CLOTRIMAZOLE 10 MG: 10 LOZENGE ORAL; TOPICAL at 10:06

## 2021-06-29 RX ADMIN — OXYCODONE 15 MG: 5 TABLET ORAL at 12:06

## 2021-06-30 ENCOUNTER — EXTERNAL CHRONIC CARE MANAGEMENT (OUTPATIENT)
Dept: PRIMARY CARE CLINIC | Facility: CLINIC | Age: 71
End: 2021-06-30
Payer: MEDICARE

## 2021-06-30 PROCEDURE — 99490 PR CHRONIC CARE MGMT, 1ST 20 MIN: ICD-10-PCS | Mod: S$GLB,,, | Performed by: INTERNAL MEDICINE

## 2021-06-30 PROCEDURE — 99490 CHRNC CARE MGMT STAFF 1ST 20: CPT | Mod: S$GLB,,, | Performed by: INTERNAL MEDICINE

## 2021-07-03 ENCOUNTER — HOSPITAL ENCOUNTER (INPATIENT)
Facility: HOSPITAL | Age: 71
LOS: 6 days | Discharge: NURSING, REHAB OR LONG-TERM CARE | DRG: 917 | End: 2021-07-09
Attending: EMERGENCY MEDICINE | Admitting: SURGERY
Payer: MEDICARE

## 2021-07-03 DIAGNOSIS — N17.9 ACUTE KIDNEY INJURY: ICD-10-CM

## 2021-07-03 DIAGNOSIS — R10.10 PAIN OF UPPER ABDOMEN: ICD-10-CM

## 2021-07-03 DIAGNOSIS — R10.9 ABDOMINAL PAIN, UNSPECIFIED ABDOMINAL LOCATION: ICD-10-CM

## 2021-07-03 DIAGNOSIS — R50.9 FEVER, UNSPECIFIED FEVER CAUSE: ICD-10-CM

## 2021-07-03 DIAGNOSIS — Z91.041 CONTRAST MEDIA ALLERGY: ICD-10-CM

## 2021-07-03 DIAGNOSIS — C24.9 MALIGNANT NEOPLASM OF BILIARY TRACT, UNSPECIFIED: ICD-10-CM

## 2021-07-03 DIAGNOSIS — R41.82 ALTERED MENTAL STATUS: ICD-10-CM

## 2021-07-03 DIAGNOSIS — R40.0 SOMNOLENCE: ICD-10-CM

## 2021-07-03 DIAGNOSIS — T81.718A POST-EMBOLIZATION SYNDROME FOLLOWING CHEMOEMBOLIZATION OF LIVER, INITIAL ENCOUNTER: ICD-10-CM

## 2021-07-03 DIAGNOSIS — R79.89 ELEVATED TROPONIN: ICD-10-CM

## 2021-07-03 DIAGNOSIS — R94.31 ABNORMAL ELECTROCARDIOGRAM: ICD-10-CM

## 2021-07-03 DIAGNOSIS — G93.40 ACUTE ENCEPHALOPATHY: Primary | ICD-10-CM

## 2021-07-03 DIAGNOSIS — I10 ESSENTIAL HYPERTENSION: Chronic | ICD-10-CM

## 2021-07-03 DIAGNOSIS — T81.718S: ICD-10-CM

## 2021-07-03 DIAGNOSIS — R53.1 WEAKNESS: ICD-10-CM

## 2021-07-03 PROBLEM — I63.9 LEFT PONTINE CVA: Status: ACTIVE | Noted: 2021-07-03

## 2021-07-03 PROBLEM — E78.00 PURE HYPERCHOLESTEROLEMIA: Status: ACTIVE | Noted: 2021-07-03

## 2021-07-03 LAB
ABO GROUP BLD: NORMAL
ALBUMIN SERPL BCP-MCNC: 2.6 G/DL (ref 3.5–5.2)
ALP SERPL-CCNC: 148 U/L (ref 55–135)
ALT SERPL W/O P-5'-P-CCNC: 30 U/L (ref 10–44)
AMMONIA PLAS-SCNC: 25 UMOL/L (ref 10–50)
AMPHET+METHAMPHET UR QL: NEGATIVE
ANION GAP SERPL CALC-SCNC: 12 MMOL/L (ref 8–16)
ANION GAP SERPL CALC-SCNC: 13 MMOL/L (ref 8–16)
APTT BLDCRRT: 21.9 SEC (ref 21–32)
AST SERPL-CCNC: 23 U/L (ref 10–40)
AV INDEX (PROSTH): 0.95
AV MEAN GRADIENT: 1 MMHG
AV PEAK GRADIENT: 19 MMHG
AV VALVE AREA: 3.62 CM2
AV VELOCITY RATIO: 0.73
B-OH-BUTYR BLD STRIP-SCNC: 0.2 MMOL/L (ref 0–0.5)
BARBITURATES UR QL SCN>200 NG/ML: NEGATIVE
BASOPHILS # BLD AUTO: 0.03 K/UL (ref 0–0.2)
BASOPHILS NFR BLD: 0.3 % (ref 0–1.9)
BENZODIAZ UR QL SCN>200 NG/ML: NEGATIVE
BILIRUB SERPL-MCNC: 1 MG/DL (ref 0.1–1)
BILIRUB UR QL STRIP: NEGATIVE
BLD GP AB SCN CELLS X3 SERPL QL: NORMAL
BNP SERPL-MCNC: 87 PG/ML (ref 0–99)
BSA FOR ECHO PROCEDURE: 1.96 M2
BUN SERPL-MCNC: 36 MG/DL (ref 8–23)
BUN SERPL-MCNC: 56 MG/DL (ref 8–23)
BZE UR QL SCN: NEGATIVE
CALCIUM SERPL-MCNC: 7.9 MG/DL (ref 8.7–10.5)
CALCIUM SERPL-MCNC: 8.7 MG/DL (ref 8.7–10.5)
CANNABINOIDS UR QL SCN: NEGATIVE
CHLORIDE SERPL-SCNC: 106 MMOL/L (ref 95–110)
CHLORIDE SERPL-SCNC: 110 MMOL/L (ref 95–110)
CK SERPL-CCNC: 511 U/L (ref 20–180)
CLARITY UR: CLEAR
CO2 SERPL-SCNC: 21 MMOL/L (ref 23–29)
CO2 SERPL-SCNC: 23 MMOL/L (ref 23–29)
COLOR UR: YELLOW
CREAT SERPL-MCNC: 1.6 MG/DL (ref 0.5–1.4)
CREAT SERPL-MCNC: 2.7 MG/DL (ref 0.5–1.4)
CREAT UR-MCNC: 159.2 MG/DL (ref 15–325)
CTP QC/QA: YES
CV ECHO LV RWT: 0.56 CM
DIFFERENTIAL METHOD: ABNORMAL
DOP CALC AO PEAK VEL: 2.2 M/S
DOP CALC AO VTI: 36.2 CM
DOP CALC LVOT AREA: 3.8 CM2
DOP CALC LVOT DIAMETER: 2.2 CM
DOP CALC LVOT PEAK VEL: 1.6 M/S
DOP CALC LVOT STROKE VOLUME: 131.08 CM3
DOP CALC MV VTI: 25 CM
DOP CALCLVOT PEAK VEL VTI: 34.5 CM
E/A RATIO: 0.62
E/E' RATIO: 15.6 M/S
ECHO LV POSTERIOR WALL: 1.1 CM (ref 0.6–1.1)
EJECTION FRACTION: 70 %
EOSINOPHIL # BLD AUTO: 0 K/UL (ref 0–0.5)
EOSINOPHIL NFR BLD: 0.3 % (ref 0–8)
ERYTHROCYTE [DISTWIDTH] IN BLOOD BY AUTOMATED COUNT: 14.2 % (ref 11.5–14.5)
EST. GFR  (AFRICAN AMERICAN): 20 ML/MIN/1.73 M^2
EST. GFR  (AFRICAN AMERICAN): 38 ML/MIN/1.73 M^2
EST. GFR  (NON AFRICAN AMERICAN): 17 ML/MIN/1.73 M^2
EST. GFR  (NON AFRICAN AMERICAN): 33 ML/MIN/1.73 M^2
FRACTIONAL SHORTENING: 31 % (ref 28–44)
GLUCOSE SERPL-MCNC: 138 MG/DL (ref 70–110)
GLUCOSE SERPL-MCNC: 162 MG/DL (ref 70–110)
GLUCOSE UR QL STRIP: NEGATIVE
HCO3 UR-SCNC: 26.6 MMOL/L (ref 24–28)
HCT VFR BLD AUTO: 29.4 % (ref 37–48.5)
HGB BLD-MCNC: 8.7 G/DL (ref 12–16)
HGB UR QL STRIP: NEGATIVE
IMM GRANULOCYTES # BLD AUTO: 0.16 K/UL (ref 0–0.04)
IMM GRANULOCYTES NFR BLD AUTO: 1.6 % (ref 0–0.5)
INR PPP: 1.1 (ref 0.8–1.2)
INTERVENTRICULAR SEPTUM: 1.2 CM (ref 0.6–1.1)
KETONES UR QL STRIP: NEGATIVE
LA MAJOR: 5.5 CM
LA MINOR: 4.1 CM
LA WIDTH: 3.6 CM
LACTATE SERPL-SCNC: 2.1 MMOL/L (ref 0.5–2.2)
LEFT ATRIUM SIZE: 2.9 CM
LEFT ATRIUM VOLUME INDEX: 21.7 ML/M2
LEFT ATRIUM VOLUME: 41.69 CM3
LEFT INTERNAL DIMENSION IN SYSTOLE: 2.7 CM (ref 2.1–4)
LEFT VENTRICLE MASS INDEX: 78 G/M2
LEFT VENTRICULAR INTERNAL DIMENSION IN DIASTOLE: 3.9 CM (ref 3.5–6)
LEFT VENTRICULAR MASS: 149.54 G
LEUKOCYTE ESTERASE UR QL STRIP: NEGATIVE
LIPASE SERPL-CCNC: 16 U/L (ref 4–60)
LV LATERAL E/E' RATIO: 15.6 M/S
LV SEPTAL E/E' RATIO: 15.6 M/S
LYMPHOCYTES # BLD AUTO: 0.5 K/UL (ref 1–4.8)
LYMPHOCYTES NFR BLD: 4.7 % (ref 18–48)
MAGNESIUM SERPL-MCNC: 1.6 MG/DL (ref 1.6–2.6)
MAGNESIUM SERPL-MCNC: 1.9 MG/DL (ref 1.6–2.6)
MCH RBC QN AUTO: 27.5 PG (ref 27–31)
MCHC RBC AUTO-ENTMCNC: 29.6 G/DL (ref 32–36)
MCV RBC AUTO: 93 FL (ref 82–98)
METHADONE UR QL SCN>300 NG/ML: NEGATIVE
MONOCYTES # BLD AUTO: 0.8 K/UL (ref 0.3–1)
MONOCYTES NFR BLD: 8.1 % (ref 4–15)
MV PEAK A VEL: 1.25 M/S
MV PEAK E VEL: 0.78 M/S
MV STENOSIS PRESSURE HALF TIME: 67 MS
MV VALVE AREA BY CONTINUITY EQUATION: 5.24 CM2
MV VALVE AREA P 1/2 METHOD: 3.28 CM2
NEUTROPHILS # BLD AUTO: 8.7 K/UL (ref 1.8–7.7)
NEUTROPHILS NFR BLD: 85 % (ref 38–73)
NITRITE UR QL STRIP: NEGATIVE
NRBC BLD-RTO: 0 /100 WBC
OPIATES UR QL SCN: ABNORMAL
PCO2 BLDA: 46.6 MMHG (ref 35–45)
PCP UR QL SCN>25 NG/ML: NEGATIVE
PH SMN: 7.36 [PH] (ref 7.35–7.45)
PH UR STRIP: 5 [PH] (ref 5–8)
PISA TR MAX VEL: 2.96 M/S
PLATELET # BLD AUTO: 198 K/UL (ref 150–450)
PMV BLD AUTO: 12 FL (ref 9.2–12.9)
PO2 BLDA: 65 MMHG (ref 80–100)
POC BE: 1 MMOL/L
POC SATURATED O2: 91 % (ref 95–100)
POC TCO2: 28 MMOL/L (ref 23–27)
POCT GLUCOSE: 163 MG/DL (ref 70–110)
POTASSIUM SERPL-SCNC: 2.8 MMOL/L (ref 3.5–5.1)
POTASSIUM SERPL-SCNC: 3 MMOL/L (ref 3.5–5.1)
PROT SERPL-MCNC: 6.7 G/DL (ref 6–8.4)
PROT UR QL STRIP: ABNORMAL
PROTHROMBIN TIME: 11.5 SEC (ref 9–12.5)
RA MAJOR: 3.9 CM
RA PRESSURE: 3 MMHG
RA WIDTH: 2.5 CM
RBC # BLD AUTO: 3.16 M/UL (ref 4–5.4)
RH BLD: NORMAL
RIGHT VENTRICULAR END-DIASTOLIC DIMENSION: 2.6 CM
SAMPLE: ABNORMAL
SARS-COV-2 RDRP RESP QL NAA+PROBE: NEGATIVE
SITE: ABNORMAL
SODIUM SERPL-SCNC: 142 MMOL/L (ref 136–145)
SODIUM SERPL-SCNC: 143 MMOL/L (ref 136–145)
SP GR UR STRIP: 1.01 (ref 1–1.03)
T4 FREE SERPL-MCNC: 1.52 NG/DL (ref 0.71–1.51)
TDI LATERAL: 0.05 M/S
TDI SEPTAL: 0.05 M/S
TDI: 0.05 M/S
TOXICOLOGY INFORMATION: ABNORMAL
TR MAX PG: 35 MMHG
TROPONIN I SERPL DL<=0.01 NG/ML-MCNC: 0.04 NG/ML (ref 0–0.03)
TROPONIN I SERPL DL<=0.01 NG/ML-MCNC: 0.05 NG/ML (ref 0–0.03)
TSH SERPL DL<=0.005 MIU/L-ACNC: 0.06 UIU/ML (ref 0.4–4)
TV REST PULMONARY ARTERY PRESSURE: 38 MMHG
URATE SERPL-MCNC: <0.5 MG/DL (ref 2.4–5.7)
URN SPEC COLLECT METH UR: ABNORMAL
UROBILINOGEN UR STRIP-ACNC: NEGATIVE EU/DL
WBC # BLD AUTO: 10.23 K/UL (ref 3.9–12.7)

## 2021-07-03 PROCEDURE — 85730 THROMBOPLASTIN TIME PARTIAL: CPT | Performed by: EMERGENCY MEDICINE

## 2021-07-03 PROCEDURE — 96367 TX/PROPH/DG ADDL SEQ IV INF: CPT

## 2021-07-03 PROCEDURE — 84443 ASSAY THYROID STIM HORMONE: CPT | Performed by: EMERGENCY MEDICINE

## 2021-07-03 PROCEDURE — 96361 HYDRATE IV INFUSION ADD-ON: CPT | Mod: 59

## 2021-07-03 PROCEDURE — 82140 ASSAY OF AMMONIA: CPT | Performed by: EMERGENCY MEDICINE

## 2021-07-03 PROCEDURE — 63600175 PHARM REV CODE 636 W HCPCS: Performed by: STUDENT IN AN ORGANIZED HEALTH CARE EDUCATION/TRAINING PROGRAM

## 2021-07-03 PROCEDURE — 25000003 PHARM REV CODE 250: Performed by: STUDENT IN AN ORGANIZED HEALTH CARE EDUCATION/TRAINING PROGRAM

## 2021-07-03 PROCEDURE — 80053 COMPREHEN METABOLIC PANEL: CPT | Performed by: EMERGENCY MEDICINE

## 2021-07-03 PROCEDURE — 25000003 PHARM REV CODE 250: Performed by: EMERGENCY MEDICINE

## 2021-07-03 PROCEDURE — 83690 ASSAY OF LIPASE: CPT | Performed by: EMERGENCY MEDICINE

## 2021-07-03 PROCEDURE — 96365 THER/PROPH/DIAG IV INF INIT: CPT | Mod: 59

## 2021-07-03 PROCEDURE — 25000003 PHARM REV CODE 250: Performed by: INTERNAL MEDICINE

## 2021-07-03 PROCEDURE — U0002 COVID-19 LAB TEST NON-CDC: HCPCS | Performed by: EMERGENCY MEDICINE

## 2021-07-03 PROCEDURE — 82962 GLUCOSE BLOOD TEST: CPT

## 2021-07-03 PROCEDURE — 86900 BLOOD TYPING SEROLOGIC ABO: CPT | Performed by: EMERGENCY MEDICINE

## 2021-07-03 PROCEDURE — 85025 COMPLETE CBC W/AUTO DIFF WBC: CPT | Performed by: EMERGENCY MEDICINE

## 2021-07-03 PROCEDURE — 87040 BLOOD CULTURE FOR BACTERIA: CPT | Mod: 59 | Performed by: EMERGENCY MEDICINE

## 2021-07-03 PROCEDURE — 86850 RBC ANTIBODY SCREEN: CPT | Performed by: EMERGENCY MEDICINE

## 2021-07-03 PROCEDURE — 85610 PROTHROMBIN TIME: CPT | Performed by: EMERGENCY MEDICINE

## 2021-07-03 PROCEDURE — 25500020 PHARM REV CODE 255: Performed by: INTERNAL MEDICINE

## 2021-07-03 PROCEDURE — 93010 EKG 12-LEAD: ICD-10-PCS | Mod: 76,,, | Performed by: INTERNAL MEDICINE

## 2021-07-03 PROCEDURE — 83735 ASSAY OF MAGNESIUM: CPT | Mod: 91 | Performed by: SURGERY

## 2021-07-03 PROCEDURE — S0030 INJECTION, METRONIDAZOLE: HCPCS | Performed by: STUDENT IN AN ORGANIZED HEALTH CARE EDUCATION/TRAINING PROGRAM

## 2021-07-03 PROCEDURE — 80048 BASIC METABOLIC PNL TOTAL CA: CPT | Performed by: SURGERY

## 2021-07-03 PROCEDURE — 83605 ASSAY OF LACTIC ACID: CPT | Performed by: EMERGENCY MEDICINE

## 2021-07-03 PROCEDURE — 84550 ASSAY OF BLOOD/URIC ACID: CPT | Performed by: EMERGENCY MEDICINE

## 2021-07-03 PROCEDURE — 99222 1ST HOSP IP/OBS MODERATE 55: CPT | Mod: 25,,, | Performed by: INTERNAL MEDICINE

## 2021-07-03 PROCEDURE — 99285 EMERGENCY DEPT VISIT HI MDM: CPT | Mod: 25

## 2021-07-03 PROCEDURE — 96372 THER/PROPH/DIAG INJ SC/IM: CPT

## 2021-07-03 PROCEDURE — 80307 DRUG TEST PRSMV CHEM ANLYZR: CPT | Performed by: EMERGENCY MEDICINE

## 2021-07-03 PROCEDURE — 93010 ELECTROCARDIOGRAM REPORT: CPT | Mod: ,,, | Performed by: INTERNAL MEDICINE

## 2021-07-03 PROCEDURE — 83735 ASSAY OF MAGNESIUM: CPT | Performed by: EMERGENCY MEDICINE

## 2021-07-03 PROCEDURE — 63600175 PHARM REV CODE 636 W HCPCS: Performed by: EMERGENCY MEDICINE

## 2021-07-03 PROCEDURE — 86901 BLOOD TYPING SEROLOGIC RH(D): CPT | Performed by: EMERGENCY MEDICINE

## 2021-07-03 PROCEDURE — 20000000 HC ICU ROOM

## 2021-07-03 PROCEDURE — 93010 ELECTROCARDIOGRAM REPORT: CPT | Mod: 76,,, | Performed by: INTERNAL MEDICINE

## 2021-07-03 PROCEDURE — 84439 ASSAY OF FREE THYROXINE: CPT | Performed by: EMERGENCY MEDICINE

## 2021-07-03 PROCEDURE — 36415 COLL VENOUS BLD VENIPUNCTURE: CPT | Performed by: SURGERY

## 2021-07-03 PROCEDURE — 93005 ELECTROCARDIOGRAM TRACING: CPT

## 2021-07-03 PROCEDURE — 96366 THER/PROPH/DIAG IV INF ADDON: CPT

## 2021-07-03 PROCEDURE — 84484 ASSAY OF TROPONIN QUANT: CPT | Mod: 91 | Performed by: EMERGENCY MEDICINE

## 2021-07-03 PROCEDURE — 82010 KETONE BODYS QUAN: CPT | Performed by: EMERGENCY MEDICINE

## 2021-07-03 PROCEDURE — 82550 ASSAY OF CK (CPK): CPT | Performed by: EMERGENCY MEDICINE

## 2021-07-03 PROCEDURE — 94761 N-INVAS EAR/PLS OXIMETRY MLT: CPT

## 2021-07-03 PROCEDURE — 81003 URINALYSIS AUTO W/O SCOPE: CPT | Mod: 59 | Performed by: EMERGENCY MEDICINE

## 2021-07-03 PROCEDURE — 99900035 HC TECH TIME PER 15 MIN (STAT)

## 2021-07-03 PROCEDURE — 83880 ASSAY OF NATRIURETIC PEPTIDE: CPT | Performed by: EMERGENCY MEDICINE

## 2021-07-03 PROCEDURE — 99222 PR INITIAL HOSPITAL CARE,LEVL II: ICD-10-PCS | Mod: 25,,, | Performed by: INTERNAL MEDICINE

## 2021-07-03 RX ORDER — POTASSIUM CHLORIDE 7.45 MG/ML
10 INJECTION INTRAVENOUS
Status: COMPLETED | OUTPATIENT
Start: 2021-07-03 | End: 2021-07-03

## 2021-07-03 RX ORDER — LIDOCAINE HYDROCHLORIDE 10 MG/ML
1 INJECTION, SOLUTION EPIDURAL; INFILTRATION; INTRACAUDAL; PERINEURAL ONCE
Status: DISCONTINUED | OUTPATIENT
Start: 2021-07-03 | End: 2021-07-03

## 2021-07-03 RX ORDER — POTASSIUM CHLORIDE 7.45 MG/ML
60 INJECTION INTRAVENOUS
Status: DISCONTINUED | OUTPATIENT
Start: 2021-07-03 | End: 2021-07-05

## 2021-07-03 RX ORDER — METRONIDAZOLE 500 MG/100ML
500 INJECTION, SOLUTION INTRAVENOUS
Status: DISCONTINUED | OUTPATIENT
Start: 2021-07-03 | End: 2021-07-05

## 2021-07-03 RX ORDER — ONDANSETRON 2 MG/ML
4 INJECTION INTRAMUSCULAR; INTRAVENOUS EVERY 8 HOURS PRN
Status: DISCONTINUED | OUTPATIENT
Start: 2021-07-03 | End: 2021-07-09 | Stop reason: HOSPADM

## 2021-07-03 RX ORDER — MAGNESIUM SULFATE HEPTAHYDRATE 40 MG/ML
4 INJECTION, SOLUTION INTRAVENOUS
Status: DISCONTINUED | OUTPATIENT
Start: 2021-07-03 | End: 2021-07-05

## 2021-07-03 RX ORDER — PROCHLORPERAZINE EDISYLATE 5 MG/ML
5 INJECTION INTRAMUSCULAR; INTRAVENOUS EVERY 6 HOURS PRN
Status: DISCONTINUED | OUTPATIENT
Start: 2021-07-03 | End: 2021-07-09 | Stop reason: HOSPADM

## 2021-07-03 RX ORDER — METOPROLOL TARTRATE 25 MG/1
25 TABLET, FILM COATED ORAL 2 TIMES DAILY
Status: DISCONTINUED | OUTPATIENT
Start: 2021-07-03 | End: 2021-07-05

## 2021-07-03 RX ORDER — NAPROXEN SODIUM 220 MG/1
81 TABLET, FILM COATED ORAL DAILY
Status: DISCONTINUED | OUTPATIENT
Start: 2021-07-03 | End: 2021-07-09 | Stop reason: HOSPADM

## 2021-07-03 RX ORDER — NAPROXEN SODIUM 220 MG/1
81 TABLET, FILM COATED ORAL DAILY
Status: DISCONTINUED | OUTPATIENT
Start: 2021-07-03 | End: 2021-07-03

## 2021-07-03 RX ORDER — OXYCODONE AND ACETAMINOPHEN 10; 325 MG/1; MG/1
1 TABLET ORAL EVERY 4 HOURS PRN
Status: DISCONTINUED | OUTPATIENT
Start: 2021-07-03 | End: 2021-07-09 | Stop reason: HOSPADM

## 2021-07-03 RX ORDER — CEFEPIME HYDROCHLORIDE 1 G/50ML
2 INJECTION, SOLUTION INTRAVENOUS
Status: COMPLETED | OUTPATIENT
Start: 2021-07-03 | End: 2021-07-03

## 2021-07-03 RX ORDER — ATORVASTATIN CALCIUM 40 MG/1
40 TABLET, FILM COATED ORAL DAILY
Status: DISCONTINUED | OUTPATIENT
Start: 2021-07-03 | End: 2021-07-09 | Stop reason: HOSPADM

## 2021-07-03 RX ORDER — TALC
6 POWDER (GRAM) TOPICAL NIGHTLY PRN
Status: DISCONTINUED | OUTPATIENT
Start: 2021-07-03 | End: 2021-07-09 | Stop reason: HOSPADM

## 2021-07-03 RX ORDER — ONDANSETRON 8 MG/1
8 TABLET, ORALLY DISINTEGRATING ORAL EVERY 8 HOURS PRN
Status: DISCONTINUED | OUTPATIENT
Start: 2021-07-03 | End: 2021-07-09 | Stop reason: HOSPADM

## 2021-07-03 RX ORDER — SODIUM CHLORIDE 0.9 % (FLUSH) 0.9 %
10 SYRINGE (ML) INJECTION
Status: DISCONTINUED | OUTPATIENT
Start: 2021-07-03 | End: 2021-07-09 | Stop reason: HOSPADM

## 2021-07-03 RX ORDER — MORPHINE SULFATE 2 MG/ML
2 INJECTION, SOLUTION INTRAMUSCULAR; INTRAVENOUS
Status: DISCONTINUED | OUTPATIENT
Start: 2021-07-03 | End: 2021-07-07

## 2021-07-03 RX ORDER — SODIUM CHLORIDE, SODIUM LACTATE, POTASSIUM CHLORIDE, CALCIUM CHLORIDE 600; 310; 30; 20 MG/100ML; MG/100ML; MG/100ML; MG/100ML
INJECTION, SOLUTION INTRAVENOUS CONTINUOUS
Status: DISCONTINUED | OUTPATIENT
Start: 2021-07-03 | End: 2021-07-06

## 2021-07-03 RX ORDER — ACETAMINOPHEN 325 MG/1
650 TABLET ORAL EVERY 8 HOURS PRN
Status: DISCONTINUED | OUTPATIENT
Start: 2021-07-03 | End: 2021-07-09 | Stop reason: HOSPADM

## 2021-07-03 RX ORDER — CLOPIDOGREL BISULFATE 75 MG/1
75 TABLET ORAL DAILY
Status: DISCONTINUED | OUTPATIENT
Start: 2021-07-03 | End: 2021-07-09 | Stop reason: HOSPADM

## 2021-07-03 RX ORDER — DIPHENHYDRAMINE HYDROCHLORIDE 50 MG/ML
25 INJECTION INTRAMUSCULAR; INTRAVENOUS ONCE AS NEEDED
Status: COMPLETED | OUTPATIENT
Start: 2021-07-03 | End: 2021-07-06

## 2021-07-03 RX ORDER — MIDAZOLAM HYDROCHLORIDE 1 MG/ML
2 INJECTION INTRAMUSCULAR; INTRAVENOUS
Status: COMPLETED | OUTPATIENT
Start: 2021-07-03 | End: 2021-07-03

## 2021-07-03 RX ORDER — MAGNESIUM SULFATE HEPTAHYDRATE 40 MG/ML
2 INJECTION, SOLUTION INTRAVENOUS
Status: DISCONTINUED | OUTPATIENT
Start: 2021-07-03 | End: 2021-07-05

## 2021-07-03 RX ORDER — POTASSIUM CHLORIDE 7.45 MG/ML
80 INJECTION INTRAVENOUS
Status: DISCONTINUED | OUTPATIENT
Start: 2021-07-03 | End: 2021-07-05

## 2021-07-03 RX ORDER — POTASSIUM CHLORIDE 7.45 MG/ML
40 INJECTION INTRAVENOUS
Status: DISCONTINUED | OUTPATIENT
Start: 2021-07-03 | End: 2021-07-05

## 2021-07-03 RX ORDER — CEFEPIME HYDROCHLORIDE 1 G/50ML
1 INJECTION, SOLUTION INTRAVENOUS
Status: DISCONTINUED | OUTPATIENT
Start: 2021-07-03 | End: 2021-07-05

## 2021-07-03 RX ADMIN — POTASSIUM CHLORIDE 10 MEQ: 7.46 INJECTION, SOLUTION INTRAVENOUS at 10:07

## 2021-07-03 RX ADMIN — POTASSIUM CHLORIDE 10 MEQ: 7.46 INJECTION, SOLUTION INTRAVENOUS at 03:07

## 2021-07-03 RX ADMIN — SODIUM CHLORIDE, SODIUM LACTATE, POTASSIUM CHLORIDE, AND CALCIUM CHLORIDE: .6; .31; .03; .02 INJECTION, SOLUTION INTRAVENOUS at 01:07

## 2021-07-03 RX ADMIN — POTASSIUM CHLORIDE 20 MEQ: 7.46 INJECTION, SOLUTION INTRAVENOUS at 02:07

## 2021-07-03 RX ADMIN — MORPHINE SULFATE 2 MG: 2 INJECTION, SOLUTION INTRAMUSCULAR; INTRAVENOUS at 06:07

## 2021-07-03 RX ADMIN — MIDAZOLAM 2 MG: 1 INJECTION INTRAMUSCULAR; INTRAVENOUS at 03:07

## 2021-07-03 RX ADMIN — CEFEPIME 1 G: 1 INJECTION, POWDER, FOR SOLUTION INTRAMUSCULAR; INTRAVENOUS at 01:07

## 2021-07-03 RX ADMIN — POTASSIUM CHLORIDE 20 MEQ: 7.46 INJECTION, SOLUTION INTRAVENOUS at 05:07

## 2021-07-03 RX ADMIN — MORPHINE SULFATE 2 MG: 2 INJECTION, SOLUTION INTRAMUSCULAR; INTRAVENOUS at 03:07

## 2021-07-03 RX ADMIN — POTASSIUM CHLORIDE 10 MEQ: 7.46 INJECTION, SOLUTION INTRAVENOUS at 11:07

## 2021-07-03 RX ADMIN — MORPHINE SULFATE 2 MG: 2 INJECTION, SOLUTION INTRAMUSCULAR; INTRAVENOUS at 11:07

## 2021-07-03 RX ADMIN — OXYCODONE AND ACETAMINOPHEN 1 TABLET: 10; 325 TABLET ORAL at 08:07

## 2021-07-03 RX ADMIN — METRONIDAZOLE 500 MG: 500 INJECTION, SOLUTION INTRAVENOUS at 08:07

## 2021-07-03 RX ADMIN — ASPIRIN 81 MG CHEWABLE TABLET 81 MG: 81 TABLET CHEWABLE at 01:07

## 2021-07-03 RX ADMIN — CLOPIDOGREL 75 MG: 75 TABLET, FILM COATED ORAL at 09:07

## 2021-07-03 RX ADMIN — MAGNESIUM SULFATE IN WATER 2 G: 40 INJECTION, SOLUTION INTRAVENOUS at 10:07

## 2021-07-03 RX ADMIN — ATORVASTATIN CALCIUM 40 MG: 40 TABLET, FILM COATED ORAL at 11:07

## 2021-07-03 RX ADMIN — METOPROLOL TARTRATE 25 MG: 25 TABLET, FILM COATED ORAL at 09:07

## 2021-07-03 RX ADMIN — METRONIDAZOLE 500 MG: 500 INJECTION, SOLUTION INTRAVENOUS at 01:07

## 2021-07-03 RX ADMIN — SODIUM CHLORIDE 1000 ML: 0.9 INJECTION, SOLUTION INTRAVENOUS at 04:07

## 2021-07-03 RX ADMIN — SODIUM CHLORIDE, SODIUM LACTATE, POTASSIUM CHLORIDE, AND CALCIUM CHLORIDE: .6; .31; .03; .02 INJECTION, SOLUTION INTRAVENOUS at 11:07

## 2021-07-03 RX ADMIN — VANCOMYCIN HYDROCHLORIDE 2000 MG: 10 INJECTION, POWDER, LYOPHILIZED, FOR SOLUTION INTRAVENOUS at 04:07

## 2021-07-03 RX ADMIN — METOPROLOL TARTRATE 25 MG: 25 TABLET, FILM COATED ORAL at 08:07

## 2021-07-03 RX ADMIN — HUMAN ALBUMIN MICROSPHERES AND PERFLUTREN 0.11 MG: 10; .22 INJECTION, SOLUTION INTRAVENOUS at 11:07

## 2021-07-03 RX ADMIN — POTASSIUM CHLORIDE 10 MEQ: 7.46 INJECTION, SOLUTION INTRAVENOUS at 04:07

## 2021-07-03 RX ADMIN — CEFEPIME 2 G: 2 INJECTION, POWDER, FOR SOLUTION INTRAVENOUS at 04:07

## 2021-07-04 LAB
ALBUMIN SERPL BCP-MCNC: 2.3 G/DL (ref 3.5–5.2)
ALP SERPL-CCNC: 135 U/L (ref 55–135)
ALT SERPL W/O P-5'-P-CCNC: 23 U/L (ref 10–44)
ANION GAP SERPL CALC-SCNC: 13 MMOL/L (ref 8–16)
AST SERPL-CCNC: 27 U/L (ref 10–40)
BASOPHILS # BLD AUTO: 0.04 K/UL (ref 0–0.2)
BASOPHILS NFR BLD: 0.3 % (ref 0–1.9)
BILIRUB SERPL-MCNC: 0.7 MG/DL (ref 0.1–1)
BUN SERPL-MCNC: 32 MG/DL (ref 8–23)
CALCIUM SERPL-MCNC: 8.2 MG/DL (ref 8.7–10.5)
CHLORIDE SERPL-SCNC: 110 MMOL/L (ref 95–110)
CK SERPL-CCNC: 175 U/L (ref 20–180)
CO2 SERPL-SCNC: 20 MMOL/L (ref 23–29)
CREAT SERPL-MCNC: 1.4 MG/DL (ref 0.5–1.4)
DIFFERENTIAL METHOD: ABNORMAL
EOSINOPHIL # BLD AUTO: 0 K/UL (ref 0–0.5)
EOSINOPHIL NFR BLD: 0.1 % (ref 0–8)
ERYTHROCYTE [DISTWIDTH] IN BLOOD BY AUTOMATED COUNT: 14.6 % (ref 11.5–14.5)
EST. GFR  (AFRICAN AMERICAN): 45 ML/MIN/1.73 M^2
EST. GFR  (NON AFRICAN AMERICAN): 39 ML/MIN/1.73 M^2
GLUCOSE SERPL-MCNC: 154 MG/DL (ref 70–110)
HCT VFR BLD AUTO: 31.2 % (ref 37–48.5)
HGB BLD-MCNC: 9.4 G/DL (ref 12–16)
IMM GRANULOCYTES # BLD AUTO: 0.16 K/UL (ref 0–0.04)
IMM GRANULOCYTES NFR BLD AUTO: 1.3 % (ref 0–0.5)
LYMPHOCYTES # BLD AUTO: 0.5 K/UL (ref 1–4.8)
LYMPHOCYTES NFR BLD: 3.9 % (ref 18–48)
MAGNESIUM SERPL-MCNC: 2 MG/DL (ref 1.6–2.6)
MCH RBC QN AUTO: 27.6 PG (ref 27–31)
MCHC RBC AUTO-ENTMCNC: 30.1 G/DL (ref 32–36)
MCV RBC AUTO: 92 FL (ref 82–98)
MONOCYTES # BLD AUTO: 1.1 K/UL (ref 0.3–1)
MONOCYTES NFR BLD: 9.2 % (ref 4–15)
NEUTROPHILS # BLD AUTO: 10.4 K/UL (ref 1.8–7.7)
NEUTROPHILS NFR BLD: 85.2 % (ref 38–73)
NRBC BLD-RTO: 0 /100 WBC
PHOSPHATE SERPL-MCNC: 2.5 MG/DL (ref 2.7–4.5)
PLATELET # BLD AUTO: 190 K/UL (ref 150–450)
PMV BLD AUTO: 11.4 FL (ref 9.2–12.9)
POTASSIUM SERPL-SCNC: 3.8 MMOL/L (ref 3.5–5.1)
PROT SERPL-MCNC: 6.4 G/DL (ref 6–8.4)
RBC # BLD AUTO: 3.41 M/UL (ref 4–5.4)
SODIUM SERPL-SCNC: 143 MMOL/L (ref 136–145)
TROPONIN I SERPL DL<=0.01 NG/ML-MCNC: 0.05 NG/ML (ref 0–0.03)
VANCOMYCIN SERPL-MCNC: 14.6 UG/ML
WBC # BLD AUTO: 12.24 K/UL (ref 3.9–12.7)

## 2021-07-04 PROCEDURE — 25000003 PHARM REV CODE 250: Performed by: STUDENT IN AN ORGANIZED HEALTH CARE EDUCATION/TRAINING PROGRAM

## 2021-07-04 PROCEDURE — 85025 COMPLETE CBC W/AUTO DIFF WBC: CPT | Performed by: SURGERY

## 2021-07-04 PROCEDURE — 36415 COLL VENOUS BLD VENIPUNCTURE: CPT | Performed by: STUDENT IN AN ORGANIZED HEALTH CARE EDUCATION/TRAINING PROGRAM

## 2021-07-04 PROCEDURE — 82550 ASSAY OF CK (CPK): CPT | Performed by: STUDENT IN AN ORGANIZED HEALTH CARE EDUCATION/TRAINING PROGRAM

## 2021-07-04 PROCEDURE — 20000000 HC ICU ROOM

## 2021-07-04 PROCEDURE — 94761 N-INVAS EAR/PLS OXIMETRY MLT: CPT

## 2021-07-04 PROCEDURE — 84484 ASSAY OF TROPONIN QUANT: CPT | Performed by: INTERNAL MEDICINE

## 2021-07-04 PROCEDURE — 84100 ASSAY OF PHOSPHORUS: CPT | Performed by: SURGERY

## 2021-07-04 PROCEDURE — 80053 COMPREHEN METABOLIC PANEL: CPT | Performed by: SURGERY

## 2021-07-04 PROCEDURE — 99233 SBSQ HOSP IP/OBS HIGH 50: CPT | Mod: ,,, | Performed by: INTERNAL MEDICINE

## 2021-07-04 PROCEDURE — 36415 COLL VENOUS BLD VENIPUNCTURE: CPT

## 2021-07-04 PROCEDURE — 83735 ASSAY OF MAGNESIUM: CPT | Performed by: SURGERY

## 2021-07-04 PROCEDURE — 80202 ASSAY OF VANCOMYCIN: CPT

## 2021-07-04 PROCEDURE — 63600175 PHARM REV CODE 636 W HCPCS: Performed by: SURGERY

## 2021-07-04 PROCEDURE — 36415 COLL VENOUS BLD VENIPUNCTURE: CPT | Performed by: INTERNAL MEDICINE

## 2021-07-04 PROCEDURE — 25000003 PHARM REV CODE 250: Performed by: SURGERY

## 2021-07-04 PROCEDURE — 63600175 PHARM REV CODE 636 W HCPCS: Performed by: STUDENT IN AN ORGANIZED HEALTH CARE EDUCATION/TRAINING PROGRAM

## 2021-07-04 PROCEDURE — S0030 INJECTION, METRONIDAZOLE: HCPCS | Performed by: STUDENT IN AN ORGANIZED HEALTH CARE EDUCATION/TRAINING PROGRAM

## 2021-07-04 PROCEDURE — 99233 PR SUBSEQUENT HOSPITAL CARE,LEVL III: ICD-10-PCS | Mod: ,,, | Performed by: INTERNAL MEDICINE

## 2021-07-04 RX ORDER — MUPIROCIN 20 MG/G
OINTMENT TOPICAL 2 TIMES DAILY
Status: COMPLETED | OUTPATIENT
Start: 2021-07-04 | End: 2021-07-08

## 2021-07-04 RX ORDER — SODIUM CHLORIDE 9 MG/ML
INJECTION, SOLUTION INTRAVENOUS CONTINUOUS
Status: DISCONTINUED | OUTPATIENT
Start: 2021-07-04 | End: 2021-07-09 | Stop reason: HOSPADM

## 2021-07-04 RX ORDER — AMLODIPINE BESYLATE 5 MG/1
5 TABLET ORAL DAILY
Status: DISCONTINUED | OUTPATIENT
Start: 2021-07-04 | End: 2021-07-09 | Stop reason: HOSPADM

## 2021-07-04 RX ORDER — HYDRALAZINE HYDROCHLORIDE 20 MG/ML
10 INJECTION INTRAMUSCULAR; INTRAVENOUS EVERY 6 HOURS PRN
Status: DISCONTINUED | OUTPATIENT
Start: 2021-07-04 | End: 2021-07-09 | Stop reason: HOSPADM

## 2021-07-04 RX ORDER — OCTREOTIDE ACETATE 100 UG/ML
50 INJECTION, SOLUTION INTRAVENOUS; SUBCUTANEOUS EVERY 8 HOURS
Status: DISCONTINUED | OUTPATIENT
Start: 2021-07-04 | End: 2021-07-09 | Stop reason: HOSPADM

## 2021-07-04 RX ORDER — LABETALOL HYDROCHLORIDE 5 MG/ML
10 INJECTION, SOLUTION INTRAVENOUS EVERY 6 HOURS PRN
Status: DISCONTINUED | OUTPATIENT
Start: 2021-07-04 | End: 2021-07-09 | Stop reason: HOSPADM

## 2021-07-04 RX ORDER — ENOXAPARIN SODIUM 100 MG/ML
40 INJECTION SUBCUTANEOUS EVERY 24 HOURS
Status: DISCONTINUED | OUTPATIENT
Start: 2021-07-04 | End: 2021-07-09 | Stop reason: HOSPADM

## 2021-07-04 RX ADMIN — POTASSIUM CHLORIDE 10 MEQ: 7.46 INJECTION, SOLUTION INTRAVENOUS at 11:07

## 2021-07-04 RX ADMIN — SODIUM CHLORIDE: 0.9 INJECTION, SOLUTION INTRAVENOUS at 08:07

## 2021-07-04 RX ADMIN — MUPIROCIN: 20 OINTMENT TOPICAL at 09:07

## 2021-07-04 RX ADMIN — ASPIRIN 81 MG CHEWABLE TABLET 81 MG: 81 TABLET CHEWABLE at 08:07

## 2021-07-04 RX ADMIN — OXYCODONE AND ACETAMINOPHEN 1 TABLET: 10; 325 TABLET ORAL at 05:07

## 2021-07-04 RX ADMIN — POTASSIUM CHLORIDE 10 MEQ: 7.46 INJECTION, SOLUTION INTRAVENOUS at 08:07

## 2021-07-04 RX ADMIN — CLOPIDOGREL 75 MG: 75 TABLET, FILM COATED ORAL at 08:07

## 2021-07-04 RX ADMIN — LABETALOL HYDROCHLORIDE 10 MG: 5 INJECTION, SOLUTION INTRAVENOUS at 05:07

## 2021-07-04 RX ADMIN — POTASSIUM CHLORIDE 10 MEQ: 7.46 INJECTION, SOLUTION INTRAVENOUS at 02:07

## 2021-07-04 RX ADMIN — OCTREOTIDE ACETATE 50 MCG: 100 INJECTION, SOLUTION INTRAVENOUS; SUBCUTANEOUS at 01:07

## 2021-07-04 RX ADMIN — VANCOMYCIN HYDROCHLORIDE 1250 MG: 1.25 INJECTION, POWDER, LYOPHILIZED, FOR SOLUTION INTRAVENOUS at 09:07

## 2021-07-04 RX ADMIN — OXYCODONE AND ACETAMINOPHEN 1 TABLET: 10; 325 TABLET ORAL at 10:07

## 2021-07-04 RX ADMIN — POTASSIUM CHLORIDE 10 MEQ: 7.46 INJECTION, SOLUTION INTRAVENOUS at 09:07

## 2021-07-04 RX ADMIN — METRONIDAZOLE 500 MG: 500 INJECTION, SOLUTION INTRAVENOUS at 09:07

## 2021-07-04 RX ADMIN — METRONIDAZOLE 500 MG: 500 INJECTION, SOLUTION INTRAVENOUS at 01:07

## 2021-07-04 RX ADMIN — CEFEPIME 1 G: 1 INJECTION, POWDER, FOR SOLUTION INTRAMUSCULAR; INTRAVENOUS at 01:07

## 2021-07-04 RX ADMIN — POTASSIUM CHLORIDE 10 MEQ: 7.46 INJECTION, SOLUTION INTRAVENOUS at 03:07

## 2021-07-04 RX ADMIN — POTASSIUM CHLORIDE 10 MEQ: 7.46 INJECTION, SOLUTION INTRAVENOUS at 10:07

## 2021-07-04 RX ADMIN — METOPROLOL TARTRATE 25 MG: 25 TABLET, FILM COATED ORAL at 09:07

## 2021-07-04 RX ADMIN — POTASSIUM CHLORIDE 10 MEQ: 7.46 INJECTION, SOLUTION INTRAVENOUS at 04:07

## 2021-07-04 RX ADMIN — METRONIDAZOLE 500 MG: 500 INJECTION, SOLUTION INTRAVENOUS at 05:07

## 2021-07-04 RX ADMIN — POTASSIUM CHLORIDE 10 MEQ: 7.46 INJECTION, SOLUTION INTRAVENOUS at 01:07

## 2021-07-04 RX ADMIN — SODIUM PHOSPHATE, MONOBASIC, MONOHYDRATE 15 MMOL: 276; 142 INJECTION, SOLUTION INTRAVENOUS at 10:07

## 2021-07-04 RX ADMIN — OCTREOTIDE ACETATE 50 MCG: 100 INJECTION, SOLUTION INTRAVENOUS; SUBCUTANEOUS at 09:07

## 2021-07-04 RX ADMIN — ATORVASTATIN CALCIUM 40 MG: 40 TABLET, FILM COATED ORAL at 08:07

## 2021-07-04 RX ADMIN — SODIUM CHLORIDE, SODIUM LACTATE, POTASSIUM CHLORIDE, AND CALCIUM CHLORIDE: .6; .31; .03; .02 INJECTION, SOLUTION INTRAVENOUS at 08:07

## 2021-07-04 RX ADMIN — METOPROLOL TARTRATE 25 MG: 25 TABLET, FILM COATED ORAL at 08:07

## 2021-07-04 RX ADMIN — AMLODIPINE BESYLATE 5 MG: 5 TABLET ORAL at 08:07

## 2021-07-04 RX ADMIN — ENOXAPARIN SODIUM 40 MG: 40 INJECTION SUBCUTANEOUS at 05:07

## 2021-07-04 RX ADMIN — SODIUM CHLORIDE, SODIUM LACTATE, POTASSIUM CHLORIDE, AND CALCIUM CHLORIDE: .6; .31; .03; .02 INJECTION, SOLUTION INTRAVENOUS at 11:07

## 2021-07-04 RX ADMIN — HYDRALAZINE HYDROCHLORIDE 10 MG: 20 INJECTION, SOLUTION INTRAMUSCULAR; INTRAVENOUS at 05:07

## 2021-07-04 RX ADMIN — MORPHINE SULFATE 2 MG: 2 INJECTION, SOLUTION INTRAMUSCULAR; INTRAVENOUS at 08:07

## 2021-07-05 PROBLEM — S06.5XAA SDH (SUBDURAL HEMATOMA): Status: RESOLVED | Noted: 2021-03-08 | Resolved: 2021-07-05

## 2021-07-05 PROBLEM — G93.40 ACUTE ENCEPHALOPATHY: Status: ACTIVE | Noted: 2021-07-05

## 2021-07-05 PROBLEM — G93.40 ACUTE ENCEPHALOPATHY: Status: RESOLVED | Noted: 2021-07-03 | Resolved: 2021-07-05

## 2021-07-05 PROBLEM — I63.9 LEFT PONTINE CVA: Status: RESOLVED | Noted: 2021-07-03 | Resolved: 2021-07-05

## 2021-07-05 PROBLEM — R94.31 ABNORMAL ELECTROCARDIOGRAM: Status: RESOLVED | Noted: 2021-07-03 | Resolved: 2021-07-05

## 2021-07-05 LAB
ALBUMIN SERPL BCP-MCNC: 2 G/DL (ref 3.5–5.2)
ALP SERPL-CCNC: 107 U/L (ref 55–135)
ALT SERPL W/O P-5'-P-CCNC: 16 U/L (ref 10–44)
ANION GAP SERPL CALC-SCNC: 8 MMOL/L (ref 8–16)
AST SERPL-CCNC: 12 U/L (ref 10–40)
BASOPHILS # BLD AUTO: 0.02 K/UL (ref 0–0.2)
BASOPHILS NFR BLD: 0.2 % (ref 0–1.9)
BILIRUB SERPL-MCNC: 0.6 MG/DL (ref 0.1–1)
BUN SERPL-MCNC: 22 MG/DL (ref 8–23)
CALCIUM SERPL-MCNC: 8 MG/DL (ref 8.7–10.5)
CHLORIDE SERPL-SCNC: 110 MMOL/L (ref 95–110)
CO2 SERPL-SCNC: 24 MMOL/L (ref 23–29)
CREAT SERPL-MCNC: 1.1 MG/DL (ref 0.5–1.4)
DIFFERENTIAL METHOD: ABNORMAL
EOSINOPHIL # BLD AUTO: 0 K/UL (ref 0–0.5)
EOSINOPHIL NFR BLD: 0.4 % (ref 0–8)
ERYTHROCYTE [DISTWIDTH] IN BLOOD BY AUTOMATED COUNT: 14.8 % (ref 11.5–14.5)
EST. GFR  (AFRICAN AMERICAN): 60 ML/MIN/1.73 M^2
EST. GFR  (NON AFRICAN AMERICAN): 52 ML/MIN/1.73 M^2
GLUCOSE SERPL-MCNC: 153 MG/DL (ref 70–110)
HCT VFR BLD AUTO: 26 % (ref 37–48.5)
HGB BLD-MCNC: 8.1 G/DL (ref 12–16)
IMM GRANULOCYTES # BLD AUTO: 0.08 K/UL (ref 0–0.04)
IMM GRANULOCYTES NFR BLD AUTO: 0.9 % (ref 0–0.5)
LYMPHOCYTES # BLD AUTO: 0.5 K/UL (ref 1–4.8)
LYMPHOCYTES NFR BLD: 5.4 % (ref 18–48)
MAGNESIUM SERPL-MCNC: 1.7 MG/DL (ref 1.6–2.6)
MCH RBC QN AUTO: 27.9 PG (ref 27–31)
MCHC RBC AUTO-ENTMCNC: 31.2 G/DL (ref 32–36)
MCV RBC AUTO: 90 FL (ref 82–98)
MONOCYTES # BLD AUTO: 0.6 K/UL (ref 0.3–1)
MONOCYTES NFR BLD: 6.6 % (ref 4–15)
NEUTROPHILS # BLD AUTO: 8.1 K/UL (ref 1.8–7.7)
NEUTROPHILS NFR BLD: 86.5 % (ref 38–73)
NRBC BLD-RTO: 0 /100 WBC
PHOSPHATE SERPL-MCNC: 2.4 MG/DL (ref 2.7–4.5)
PLATELET # BLD AUTO: 214 K/UL (ref 150–450)
PMV BLD AUTO: 11.4 FL (ref 9.2–12.9)
POTASSIUM SERPL-SCNC: 3.7 MMOL/L (ref 3.5–5.1)
PROT SERPL-MCNC: 5.7 G/DL (ref 6–8.4)
RBC # BLD AUTO: 2.9 M/UL (ref 4–5.4)
SODIUM SERPL-SCNC: 142 MMOL/L (ref 136–145)
TROPONIN I SERPL DL<=0.01 NG/ML-MCNC: 0.02 NG/ML (ref 0–0.03)
VANCOMYCIN SERPL-MCNC: 15.8 UG/ML
WBC # BLD AUTO: 9.38 K/UL (ref 3.9–12.7)

## 2021-07-05 PROCEDURE — 84100 ASSAY OF PHOSPHORUS: CPT | Performed by: SURGERY

## 2021-07-05 PROCEDURE — 80053 COMPREHEN METABOLIC PANEL: CPT | Performed by: SURGERY

## 2021-07-05 PROCEDURE — 97530 THERAPEUTIC ACTIVITIES: CPT

## 2021-07-05 PROCEDURE — 36415 COLL VENOUS BLD VENIPUNCTURE: CPT | Performed by: SURGERY

## 2021-07-05 PROCEDURE — 99233 SBSQ HOSP IP/OBS HIGH 50: CPT | Mod: ,,, | Performed by: INTERNAL MEDICINE

## 2021-07-05 PROCEDURE — 94761 N-INVAS EAR/PLS OXIMETRY MLT: CPT

## 2021-07-05 PROCEDURE — 83735 ASSAY OF MAGNESIUM: CPT | Performed by: SURGERY

## 2021-07-05 PROCEDURE — 84484 ASSAY OF TROPONIN QUANT: CPT | Performed by: STUDENT IN AN ORGANIZED HEALTH CARE EDUCATION/TRAINING PROGRAM

## 2021-07-05 PROCEDURE — 85025 COMPLETE CBC W/AUTO DIFF WBC: CPT | Performed by: SURGERY

## 2021-07-05 PROCEDURE — 80202 ASSAY OF VANCOMYCIN: CPT | Performed by: SURGERY

## 2021-07-05 PROCEDURE — 20000000 HC ICU ROOM

## 2021-07-05 PROCEDURE — 97166 OT EVAL MOD COMPLEX 45 MIN: CPT

## 2021-07-05 PROCEDURE — 25000003 PHARM REV CODE 250: Performed by: STUDENT IN AN ORGANIZED HEALTH CARE EDUCATION/TRAINING PROGRAM

## 2021-07-05 PROCEDURE — 97162 PT EVAL MOD COMPLEX 30 MIN: CPT

## 2021-07-05 PROCEDURE — 63600175 PHARM REV CODE 636 W HCPCS: Performed by: STUDENT IN AN ORGANIZED HEALTH CARE EDUCATION/TRAINING PROGRAM

## 2021-07-05 PROCEDURE — S0030 INJECTION, METRONIDAZOLE: HCPCS | Performed by: STUDENT IN AN ORGANIZED HEALTH CARE EDUCATION/TRAINING PROGRAM

## 2021-07-05 PROCEDURE — 25000003 PHARM REV CODE 250: Performed by: INTERNAL MEDICINE

## 2021-07-05 PROCEDURE — 99233 PR SUBSEQUENT HOSPITAL CARE,LEVL III: ICD-10-PCS | Mod: ,,, | Performed by: INTERNAL MEDICINE

## 2021-07-05 PROCEDURE — 97535 SELF CARE MNGMENT TRAINING: CPT

## 2021-07-05 RX ORDER — LOSARTAN POTASSIUM 50 MG/1
50 TABLET ORAL 2 TIMES DAILY
Status: DISCONTINUED | OUTPATIENT
Start: 2021-07-05 | End: 2021-07-06

## 2021-07-05 RX ORDER — METOPROLOL TARTRATE 50 MG/1
50 TABLET ORAL 2 TIMES DAILY
Status: DISCONTINUED | OUTPATIENT
Start: 2021-07-05 | End: 2021-07-09 | Stop reason: HOSPADM

## 2021-07-05 RX ADMIN — OXYCODONE AND ACETAMINOPHEN 1 TABLET: 10; 325 TABLET ORAL at 01:07

## 2021-07-05 RX ADMIN — AMLODIPINE BESYLATE 5 MG: 5 TABLET ORAL at 09:07

## 2021-07-05 RX ADMIN — ASPIRIN 81 MG CHEWABLE TABLET 81 MG: 81 TABLET CHEWABLE at 09:07

## 2021-07-05 RX ADMIN — MUPIROCIN: 20 OINTMENT TOPICAL at 09:07

## 2021-07-05 RX ADMIN — SODIUM CHLORIDE, SODIUM LACTATE, POTASSIUM CHLORIDE, AND CALCIUM CHLORIDE: .6; .31; .03; .02 INJECTION, SOLUTION INTRAVENOUS at 09:07

## 2021-07-05 RX ADMIN — MORPHINE SULFATE 2 MG: 2 INJECTION, SOLUTION INTRAMUSCULAR; INTRAVENOUS at 07:07

## 2021-07-05 RX ADMIN — MAGNESIUM SULFATE IN WATER 2 G: 40 INJECTION, SOLUTION INTRAVENOUS at 05:07

## 2021-07-05 RX ADMIN — LOSARTAN POTASSIUM 50 MG: 50 TABLET, FILM COATED ORAL at 10:07

## 2021-07-05 RX ADMIN — POTASSIUM CHLORIDE 40 MEQ: 7.46 INJECTION, SOLUTION INTRAVENOUS at 04:07

## 2021-07-05 RX ADMIN — SODIUM PHOSPHATE, MONOBASIC, MONOHYDRATE AND SODIUM PHOSPHATE, DIBASIC, ANHYDROUS 30 MMOL: 276; 142 INJECTION, SOLUTION INTRAVENOUS at 03:07

## 2021-07-05 RX ADMIN — METOPROLOL TARTRATE 25 MG: 25 TABLET, FILM COATED ORAL at 09:07

## 2021-07-05 RX ADMIN — OCTREOTIDE ACETATE 50 MCG: 100 INJECTION, SOLUTION INTRAVENOUS; SUBCUTANEOUS at 10:07

## 2021-07-05 RX ADMIN — SODIUM PHOSPHATE, MONOBASIC, MONOHYDRATE 15 MMOL: 276; 142 INJECTION, SOLUTION INTRAVENOUS at 05:07

## 2021-07-05 RX ADMIN — CLOPIDOGREL 75 MG: 75 TABLET, FILM COATED ORAL at 09:07

## 2021-07-05 RX ADMIN — OXYCODONE AND ACETAMINOPHEN 1 TABLET: 10; 325 TABLET ORAL at 10:07

## 2021-07-05 RX ADMIN — METRONIDAZOLE 500 MG: 500 INJECTION, SOLUTION INTRAVENOUS at 04:07

## 2021-07-05 RX ADMIN — METOPROLOL TARTRATE 50 MG: 50 TABLET, FILM COATED ORAL at 10:07

## 2021-07-05 RX ADMIN — ATORVASTATIN CALCIUM 40 MG: 40 TABLET, FILM COATED ORAL at 09:07

## 2021-07-05 RX ADMIN — OCTREOTIDE ACETATE 50 MCG: 100 INJECTION, SOLUTION INTRAVENOUS; SUBCUTANEOUS at 05:07

## 2021-07-05 RX ADMIN — OXYCODONE AND ACETAMINOPHEN 1 TABLET: 10; 325 TABLET ORAL at 06:07

## 2021-07-05 RX ADMIN — MORPHINE SULFATE 2 MG: 2 INJECTION, SOLUTION INTRAMUSCULAR; INTRAVENOUS at 09:07

## 2021-07-05 RX ADMIN — MORPHINE SULFATE 2 MG: 2 INJECTION, SOLUTION INTRAMUSCULAR; INTRAVENOUS at 05:07

## 2021-07-05 RX ADMIN — ENOXAPARIN SODIUM 40 MG: 40 INJECTION SUBCUTANEOUS at 05:07

## 2021-07-05 RX ADMIN — OXYCODONE AND ACETAMINOPHEN 1 TABLET: 10; 325 TABLET ORAL at 03:07

## 2021-07-06 PROBLEM — G93.40 ACUTE ENCEPHALOPATHY: Status: ACTIVE | Noted: 2021-07-06

## 2021-07-06 PROBLEM — G93.40 ACUTE ENCEPHALOPATHY: Status: RESOLVED | Noted: 2021-07-05 | Resolved: 2021-07-06

## 2021-07-06 LAB
ALBUMIN SERPL BCP-MCNC: 2 G/DL (ref 3.5–5.2)
ALP SERPL-CCNC: 104 U/L (ref 55–135)
ALT SERPL W/O P-5'-P-CCNC: 13 U/L (ref 10–44)
ANION GAP SERPL CALC-SCNC: 8 MMOL/L (ref 8–16)
AST SERPL-CCNC: 11 U/L (ref 10–40)
BASOPHILS # BLD AUTO: 0.01 K/UL (ref 0–0.2)
BASOPHILS NFR BLD: 0.1 % (ref 0–1.9)
BILIRUB SERPL-MCNC: 0.7 MG/DL (ref 0.1–1)
BUN SERPL-MCNC: 15 MG/DL (ref 8–23)
CALCIUM SERPL-MCNC: 8 MG/DL (ref 8.7–10.5)
CHLORIDE SERPL-SCNC: 110 MMOL/L (ref 95–110)
CO2 SERPL-SCNC: 27 MMOL/L (ref 23–29)
CREAT SERPL-MCNC: 1 MG/DL (ref 0.5–1.4)
DIFFERENTIAL METHOD: ABNORMAL
EOSINOPHIL # BLD AUTO: 0.1 K/UL (ref 0–0.5)
EOSINOPHIL NFR BLD: 0.7 % (ref 0–8)
ERYTHROCYTE [DISTWIDTH] IN BLOOD BY AUTOMATED COUNT: 14.8 % (ref 11.5–14.5)
EST. GFR  (AFRICAN AMERICAN): >60 ML/MIN/1.73 M^2
EST. GFR  (NON AFRICAN AMERICAN): 58 ML/MIN/1.73 M^2
GLUCOSE SERPL-MCNC: 105 MG/DL (ref 70–110)
HCT VFR BLD AUTO: 29.6 % (ref 37–48.5)
HGB BLD-MCNC: 9.2 G/DL (ref 12–16)
IMM GRANULOCYTES # BLD AUTO: 0.11 K/UL (ref 0–0.04)
IMM GRANULOCYTES NFR BLD AUTO: 1.3 % (ref 0–0.5)
LYMPHOCYTES # BLD AUTO: 0.7 K/UL (ref 1–4.8)
LYMPHOCYTES NFR BLD: 7.7 % (ref 18–48)
MAGNESIUM SERPL-MCNC: 1.7 MG/DL (ref 1.6–2.6)
MCH RBC QN AUTO: 28.2 PG (ref 27–31)
MCHC RBC AUTO-ENTMCNC: 31.1 G/DL (ref 32–36)
MCV RBC AUTO: 91 FL (ref 82–98)
MONOCYTES # BLD AUTO: 0.7 K/UL (ref 0.3–1)
MONOCYTES NFR BLD: 8.3 % (ref 4–15)
NEUTROPHILS # BLD AUTO: 7.1 K/UL (ref 1.8–7.7)
NEUTROPHILS NFR BLD: 81.9 % (ref 38–73)
NRBC BLD-RTO: 0 /100 WBC
PHOSPHATE SERPL-MCNC: 3.3 MG/DL (ref 2.7–4.5)
PLATELET # BLD AUTO: 222 K/UL (ref 150–450)
PMV BLD AUTO: 11.3 FL (ref 9.2–12.9)
POTASSIUM SERPL-SCNC: 4 MMOL/L (ref 3.5–5.1)
PROT SERPL-MCNC: 5.7 G/DL (ref 6–8.4)
RBC # BLD AUTO: 3.26 M/UL (ref 4–5.4)
SODIUM SERPL-SCNC: 145 MMOL/L (ref 136–145)
WBC # BLD AUTO: 8.7 K/UL (ref 3.9–12.7)

## 2021-07-06 PROCEDURE — 25000003 PHARM REV CODE 250: Performed by: STUDENT IN AN ORGANIZED HEALTH CARE EDUCATION/TRAINING PROGRAM

## 2021-07-06 PROCEDURE — 83735 ASSAY OF MAGNESIUM: CPT | Performed by: STUDENT IN AN ORGANIZED HEALTH CARE EDUCATION/TRAINING PROGRAM

## 2021-07-06 PROCEDURE — 97530 THERAPEUTIC ACTIVITIES: CPT

## 2021-07-06 PROCEDURE — 25000003 PHARM REV CODE 250: Performed by: INTERNAL MEDICINE

## 2021-07-06 PROCEDURE — 63600175 PHARM REV CODE 636 W HCPCS: Mod: JB | Performed by: STUDENT IN AN ORGANIZED HEALTH CARE EDUCATION/TRAINING PROGRAM

## 2021-07-06 PROCEDURE — 63600175 PHARM REV CODE 636 W HCPCS: Performed by: FAMILY MEDICINE

## 2021-07-06 PROCEDURE — 36415 COLL VENOUS BLD VENIPUNCTURE: CPT | Performed by: STUDENT IN AN ORGANIZED HEALTH CARE EDUCATION/TRAINING PROGRAM

## 2021-07-06 PROCEDURE — 63600175 PHARM REV CODE 636 W HCPCS: Performed by: STUDENT IN AN ORGANIZED HEALTH CARE EDUCATION/TRAINING PROGRAM

## 2021-07-06 PROCEDURE — 85025 COMPLETE CBC W/AUTO DIFF WBC: CPT | Performed by: STUDENT IN AN ORGANIZED HEALTH CARE EDUCATION/TRAINING PROGRAM

## 2021-07-06 PROCEDURE — 80053 COMPREHEN METABOLIC PANEL: CPT | Performed by: STUDENT IN AN ORGANIZED HEALTH CARE EDUCATION/TRAINING PROGRAM

## 2021-07-06 PROCEDURE — 11000001 HC ACUTE MED/SURG PRIVATE ROOM

## 2021-07-06 PROCEDURE — 84100 ASSAY OF PHOSPHORUS: CPT | Performed by: STUDENT IN AN ORGANIZED HEALTH CARE EDUCATION/TRAINING PROGRAM

## 2021-07-06 PROCEDURE — 97535 SELF CARE MNGMENT TRAINING: CPT | Mod: CO

## 2021-07-06 RX ORDER — LOSARTAN POTASSIUM 50 MG/1
100 TABLET ORAL DAILY
Status: DISCONTINUED | OUTPATIENT
Start: 2021-07-06 | End: 2021-07-09 | Stop reason: HOSPADM

## 2021-07-06 RX ORDER — MAGNESIUM SULFATE HEPTAHYDRATE 40 MG/ML
2 INJECTION, SOLUTION INTRAVENOUS ONCE
Status: COMPLETED | OUTPATIENT
Start: 2021-07-06 | End: 2021-07-06

## 2021-07-06 RX ORDER — FUROSEMIDE 10 MG/ML
40 INJECTION INTRAMUSCULAR; INTRAVENOUS ONCE
Status: COMPLETED | OUTPATIENT
Start: 2021-07-06 | End: 2021-07-06

## 2021-07-06 RX ADMIN — OXYCODONE AND ACETAMINOPHEN 1 TABLET: 10; 325 TABLET ORAL at 08:07

## 2021-07-06 RX ADMIN — ENOXAPARIN SODIUM 40 MG: 40 INJECTION SUBCUTANEOUS at 05:07

## 2021-07-06 RX ADMIN — SODIUM CHLORIDE, SODIUM LACTATE, POTASSIUM CHLORIDE, AND CALCIUM CHLORIDE: .6; .31; .03; .02 INJECTION, SOLUTION INTRAVENOUS at 03:07

## 2021-07-06 RX ADMIN — ASPIRIN 81 MG CHEWABLE TABLET 81 MG: 81 TABLET CHEWABLE at 08:07

## 2021-07-06 RX ADMIN — MORPHINE SULFATE 2 MG: 2 INJECTION, SOLUTION INTRAMUSCULAR; INTRAVENOUS at 01:07

## 2021-07-06 RX ADMIN — LOSARTAN POTASSIUM 100 MG: 50 TABLET, FILM COATED ORAL at 08:07

## 2021-07-06 RX ADMIN — OXYCODONE AND ACETAMINOPHEN 1 TABLET: 10; 325 TABLET ORAL at 05:07

## 2021-07-06 RX ADMIN — OXYCODONE AND ACETAMINOPHEN 1 TABLET: 10; 325 TABLET ORAL at 04:07

## 2021-07-06 RX ADMIN — DIPHENHYDRAMINE HYDROCHLORIDE 25 MG: 50 INJECTION INTRAMUSCULAR; INTRAVENOUS at 01:07

## 2021-07-06 RX ADMIN — MUPIROCIN: 20 OINTMENT TOPICAL at 09:07

## 2021-07-06 RX ADMIN — AMLODIPINE BESYLATE 5 MG: 5 TABLET ORAL at 08:07

## 2021-07-06 RX ADMIN — MAGNESIUM SULFATE IN WATER 2 G: 40 INJECTION, SOLUTION INTRAVENOUS at 02:07

## 2021-07-06 RX ADMIN — METOPROLOL TARTRATE 50 MG: 50 TABLET, FILM COATED ORAL at 09:07

## 2021-07-06 RX ADMIN — CLOPIDOGREL 75 MG: 75 TABLET, FILM COATED ORAL at 08:07

## 2021-07-06 RX ADMIN — ATORVASTATIN CALCIUM 40 MG: 40 TABLET, FILM COATED ORAL at 08:07

## 2021-07-06 RX ADMIN — METOPROLOL TARTRATE 50 MG: 50 TABLET, FILM COATED ORAL at 08:07

## 2021-07-06 RX ADMIN — OCTREOTIDE ACETATE 50 MCG: 100 INJECTION, SOLUTION INTRAVENOUS; SUBCUTANEOUS at 06:07

## 2021-07-06 RX ADMIN — MUPIROCIN: 20 OINTMENT TOPICAL at 08:07

## 2021-07-06 RX ADMIN — MELATONIN 6 MG: 3 TAB ORAL at 09:07

## 2021-07-06 RX ADMIN — FUROSEMIDE 40 MG: 10 INJECTION, SOLUTION INTRAMUSCULAR; INTRAVENOUS at 02:07

## 2021-07-06 RX ADMIN — OCTREOTIDE ACETATE 50 MCG: 100 INJECTION, SOLUTION INTRAVENOUS; SUBCUTANEOUS at 05:07

## 2021-07-06 RX ADMIN — OXYCODONE AND ACETAMINOPHEN 1 TABLET: 10; 325 TABLET ORAL at 09:07

## 2021-07-07 LAB
ALBUMIN SERPL BCP-MCNC: 2.1 G/DL (ref 3.5–5.2)
ALP SERPL-CCNC: 126 U/L (ref 55–135)
ALT SERPL W/O P-5'-P-CCNC: 12 U/L (ref 10–44)
ANION GAP SERPL CALC-SCNC: 11 MMOL/L (ref 8–16)
AST SERPL-CCNC: 16 U/L (ref 10–40)
BASOPHILS # BLD AUTO: 0.03 K/UL (ref 0–0.2)
BASOPHILS NFR BLD: 0.4 % (ref 0–1.9)
BILIRUB SERPL-MCNC: 0.5 MG/DL (ref 0.1–1)
BUN SERPL-MCNC: 20 MG/DL (ref 8–23)
CALCIUM SERPL-MCNC: 8.1 MG/DL (ref 8.7–10.5)
CHLORIDE SERPL-SCNC: 105 MMOL/L (ref 95–110)
CO2 SERPL-SCNC: 26 MMOL/L (ref 23–29)
CREAT SERPL-MCNC: 1.2 MG/DL (ref 0.5–1.4)
DIFFERENTIAL METHOD: ABNORMAL
EOSINOPHIL # BLD AUTO: 0.1 K/UL (ref 0–0.5)
EOSINOPHIL NFR BLD: 1.3 % (ref 0–8)
ERYTHROCYTE [DISTWIDTH] IN BLOOD BY AUTOMATED COUNT: 14.8 % (ref 11.5–14.5)
EST. GFR  (AFRICAN AMERICAN): 54 ML/MIN/1.73 M^2
EST. GFR  (NON AFRICAN AMERICAN): 47 ML/MIN/1.73 M^2
GLUCOSE SERPL-MCNC: 114 MG/DL (ref 70–110)
HCT VFR BLD AUTO: 27.7 % (ref 37–48.5)
HGB BLD-MCNC: 8.5 G/DL (ref 12–16)
IMM GRANULOCYTES # BLD AUTO: 0.07 K/UL (ref 0–0.04)
IMM GRANULOCYTES NFR BLD AUTO: 1 % (ref 0–0.5)
LYMPHOCYTES # BLD AUTO: 0.9 K/UL (ref 1–4.8)
LYMPHOCYTES NFR BLD: 12.2 % (ref 18–48)
MAGNESIUM SERPL-MCNC: 2.2 MG/DL (ref 1.6–2.6)
MCH RBC QN AUTO: 28.4 PG (ref 27–31)
MCHC RBC AUTO-ENTMCNC: 30.7 G/DL (ref 32–36)
MCV RBC AUTO: 93 FL (ref 82–98)
MONOCYTES # BLD AUTO: 0.6 K/UL (ref 0.3–1)
MONOCYTES NFR BLD: 8.6 % (ref 4–15)
NEUTROPHILS # BLD AUTO: 5.5 K/UL (ref 1.8–7.7)
NEUTROPHILS NFR BLD: 76.5 % (ref 38–73)
NRBC BLD-RTO: 0 /100 WBC
PHOSPHATE SERPL-MCNC: 2.3 MG/DL (ref 2.7–4.5)
PLATELET # BLD AUTO: 251 K/UL (ref 150–450)
PMV BLD AUTO: 11.7 FL (ref 9.2–12.9)
POTASSIUM SERPL-SCNC: 3.8 MMOL/L (ref 3.5–5.1)
PROT SERPL-MCNC: 5.6 G/DL (ref 6–8.4)
RBC # BLD AUTO: 2.99 M/UL (ref 4–5.4)
SODIUM SERPL-SCNC: 142 MMOL/L (ref 136–145)
WBC # BLD AUTO: 7.19 K/UL (ref 3.9–12.7)

## 2021-07-07 PROCEDURE — 36415 COLL VENOUS BLD VENIPUNCTURE: CPT | Performed by: STUDENT IN AN ORGANIZED HEALTH CARE EDUCATION/TRAINING PROGRAM

## 2021-07-07 PROCEDURE — 25000003 PHARM REV CODE 250: Performed by: STUDENT IN AN ORGANIZED HEALTH CARE EDUCATION/TRAINING PROGRAM

## 2021-07-07 PROCEDURE — 97530 THERAPEUTIC ACTIVITIES: CPT | Mod: CQ

## 2021-07-07 PROCEDURE — 84100 ASSAY OF PHOSPHORUS: CPT | Performed by: STUDENT IN AN ORGANIZED HEALTH CARE EDUCATION/TRAINING PROGRAM

## 2021-07-07 PROCEDURE — 63600175 PHARM REV CODE 636 W HCPCS: Mod: JB | Performed by: STUDENT IN AN ORGANIZED HEALTH CARE EDUCATION/TRAINING PROGRAM

## 2021-07-07 PROCEDURE — 25000003 PHARM REV CODE 250: Performed by: INTERNAL MEDICINE

## 2021-07-07 PROCEDURE — 21400001 HC TELEMETRY ROOM

## 2021-07-07 PROCEDURE — 80053 COMPREHEN METABOLIC PANEL: CPT | Performed by: STUDENT IN AN ORGANIZED HEALTH CARE EDUCATION/TRAINING PROGRAM

## 2021-07-07 PROCEDURE — 83735 ASSAY OF MAGNESIUM: CPT | Performed by: STUDENT IN AN ORGANIZED HEALTH CARE EDUCATION/TRAINING PROGRAM

## 2021-07-07 PROCEDURE — 97535 SELF CARE MNGMENT TRAINING: CPT | Mod: CO

## 2021-07-07 PROCEDURE — 94761 N-INVAS EAR/PLS OXIMETRY MLT: CPT

## 2021-07-07 PROCEDURE — 85025 COMPLETE CBC W/AUTO DIFF WBC: CPT | Performed by: STUDENT IN AN ORGANIZED HEALTH CARE EDUCATION/TRAINING PROGRAM

## 2021-07-07 RX ADMIN — LOSARTAN POTASSIUM 100 MG: 50 TABLET, FILM COATED ORAL at 08:07

## 2021-07-07 RX ADMIN — ASPIRIN 81 MG CHEWABLE TABLET 81 MG: 81 TABLET CHEWABLE at 08:07

## 2021-07-07 RX ADMIN — METOPROLOL TARTRATE 50 MG: 50 TABLET, FILM COATED ORAL at 08:07

## 2021-07-07 RX ADMIN — OXYCODONE AND ACETAMINOPHEN 1 TABLET: 10; 325 TABLET ORAL at 12:07

## 2021-07-07 RX ADMIN — ENOXAPARIN SODIUM 40 MG: 40 INJECTION SUBCUTANEOUS at 04:07

## 2021-07-07 RX ADMIN — AMLODIPINE BESYLATE 5 MG: 5 TABLET ORAL at 08:07

## 2021-07-07 RX ADMIN — OXYCODONE AND ACETAMINOPHEN 1 TABLET: 10; 325 TABLET ORAL at 08:07

## 2021-07-07 RX ADMIN — CLOPIDOGREL 75 MG: 75 TABLET, FILM COATED ORAL at 08:07

## 2021-07-07 RX ADMIN — ATORVASTATIN CALCIUM 40 MG: 40 TABLET, FILM COATED ORAL at 08:07

## 2021-07-07 RX ADMIN — OCTREOTIDE ACETATE 50 MCG: 100 INJECTION, SOLUTION INTRAVENOUS; SUBCUTANEOUS at 08:07

## 2021-07-07 RX ADMIN — OCTREOTIDE ACETATE 50 MCG: 100 INJECTION, SOLUTION INTRAVENOUS; SUBCUTANEOUS at 10:07

## 2021-07-07 RX ADMIN — MUPIROCIN: 20 OINTMENT TOPICAL at 08:07

## 2021-07-07 RX ADMIN — OXYCODONE AND ACETAMINOPHEN 1 TABLET: 10; 325 TABLET ORAL at 05:07

## 2021-07-07 RX ADMIN — OCTREOTIDE ACETATE 50 MCG: 100 INJECTION, SOLUTION INTRAVENOUS; SUBCUTANEOUS at 12:07

## 2021-07-07 RX ADMIN — OXYCODONE AND ACETAMINOPHEN 1 TABLET: 10; 325 TABLET ORAL at 11:07

## 2021-07-07 RX ADMIN — OXYCODONE AND ACETAMINOPHEN 1 TABLET: 10; 325 TABLET ORAL at 04:07

## 2021-07-07 RX ADMIN — MORPHINE SULFATE 2 MG: 2 INJECTION, SOLUTION INTRAMUSCULAR; INTRAVENOUS at 08:07

## 2021-07-07 RX ADMIN — OCTREOTIDE ACETATE 50 MCG: 100 INJECTION, SOLUTION INTRAVENOUS; SUBCUTANEOUS at 04:07

## 2021-07-08 LAB
ALBUMIN SERPL BCP-MCNC: 2.2 G/DL (ref 3.5–5.2)
ALP SERPL-CCNC: 158 U/L (ref 55–135)
ALT SERPL W/O P-5'-P-CCNC: 12 U/L (ref 10–44)
ANION GAP SERPL CALC-SCNC: 10 MMOL/L (ref 8–16)
AST SERPL-CCNC: 13 U/L (ref 10–40)
BACTERIA BLD CULT: NORMAL
BACTERIA BLD CULT: NORMAL
BASOPHILS # BLD AUTO: 0.01 K/UL (ref 0–0.2)
BASOPHILS NFR BLD: 0.2 % (ref 0–1.9)
BILIRUB SERPL-MCNC: 0.4 MG/DL (ref 0.1–1)
BUN SERPL-MCNC: 18 MG/DL (ref 8–23)
CALCIUM SERPL-MCNC: 8.3 MG/DL (ref 8.7–10.5)
CHLORIDE SERPL-SCNC: 105 MMOL/L (ref 95–110)
CO2 SERPL-SCNC: 24 MMOL/L (ref 23–29)
CREAT SERPL-MCNC: 1.1 MG/DL (ref 0.5–1.4)
DIFFERENTIAL METHOD: ABNORMAL
EOSINOPHIL # BLD AUTO: 0.1 K/UL (ref 0–0.5)
EOSINOPHIL NFR BLD: 1.7 % (ref 0–8)
ERYTHROCYTE [DISTWIDTH] IN BLOOD BY AUTOMATED COUNT: 14.5 % (ref 11.5–14.5)
EST. GFR  (AFRICAN AMERICAN): 60 ML/MIN/1.73 M^2
EST. GFR  (NON AFRICAN AMERICAN): 52 ML/MIN/1.73 M^2
GLUCOSE SERPL-MCNC: 127 MG/DL (ref 70–110)
HCT VFR BLD AUTO: 30.1 % (ref 37–48.5)
HGB BLD-MCNC: 8.7 G/DL (ref 12–16)
IMM GRANULOCYTES # BLD AUTO: 0.06 K/UL (ref 0–0.04)
IMM GRANULOCYTES NFR BLD AUTO: 1.3 % (ref 0–0.5)
LYMPHOCYTES # BLD AUTO: 0.9 K/UL (ref 1–4.8)
LYMPHOCYTES NFR BLD: 19.2 % (ref 18–48)
MAGNESIUM SERPL-MCNC: 1.9 MG/DL (ref 1.6–2.6)
MCH RBC QN AUTO: 27.4 PG (ref 27–31)
MCHC RBC AUTO-ENTMCNC: 28.9 G/DL (ref 32–36)
MCV RBC AUTO: 95 FL (ref 82–98)
MONOCYTES # BLD AUTO: 0.6 K/UL (ref 0.3–1)
MONOCYTES NFR BLD: 11.8 % (ref 4–15)
NEUTROPHILS # BLD AUTO: 3.1 K/UL (ref 1.8–7.7)
NEUTROPHILS NFR BLD: 65.8 % (ref 38–73)
NRBC BLD-RTO: 0 /100 WBC
PHOSPHATE SERPL-MCNC: 2.2 MG/DL (ref 2.7–4.5)
PLATELET # BLD AUTO: 280 K/UL (ref 150–450)
PMV BLD AUTO: 11 FL (ref 9.2–12.9)
POTASSIUM SERPL-SCNC: 3.9 MMOL/L (ref 3.5–5.1)
PROT SERPL-MCNC: 5.8 G/DL (ref 6–8.4)
RBC # BLD AUTO: 3.17 M/UL (ref 4–5.4)
SODIUM SERPL-SCNC: 139 MMOL/L (ref 136–145)
WBC # BLD AUTO: 4.75 K/UL (ref 3.9–12.7)

## 2021-07-08 PROCEDURE — 25000003 PHARM REV CODE 250: Performed by: STUDENT IN AN ORGANIZED HEALTH CARE EDUCATION/TRAINING PROGRAM

## 2021-07-08 PROCEDURE — 63600175 PHARM REV CODE 636 W HCPCS: Mod: JB | Performed by: STUDENT IN AN ORGANIZED HEALTH CARE EDUCATION/TRAINING PROGRAM

## 2021-07-08 PROCEDURE — 85025 COMPLETE CBC W/AUTO DIFF WBC: CPT | Performed by: STUDENT IN AN ORGANIZED HEALTH CARE EDUCATION/TRAINING PROGRAM

## 2021-07-08 PROCEDURE — G0427 PR INPT TELEHEALTH CON 70/>M: ICD-10-PCS | Mod: 95,,, | Performed by: PSYCHIATRY & NEUROLOGY

## 2021-07-08 PROCEDURE — 25500020 PHARM REV CODE 255: Performed by: SURGERY

## 2021-07-08 PROCEDURE — 36415 COLL VENOUS BLD VENIPUNCTURE: CPT | Performed by: STUDENT IN AN ORGANIZED HEALTH CARE EDUCATION/TRAINING PROGRAM

## 2021-07-08 PROCEDURE — 25000003 PHARM REV CODE 250: Performed by: INTERNAL MEDICINE

## 2021-07-08 PROCEDURE — 80053 COMPREHEN METABOLIC PANEL: CPT | Performed by: STUDENT IN AN ORGANIZED HEALTH CARE EDUCATION/TRAINING PROGRAM

## 2021-07-08 PROCEDURE — 84100 ASSAY OF PHOSPHORUS: CPT | Performed by: STUDENT IN AN ORGANIZED HEALTH CARE EDUCATION/TRAINING PROGRAM

## 2021-07-08 PROCEDURE — 97535 SELF CARE MNGMENT TRAINING: CPT

## 2021-07-08 PROCEDURE — G0427 INPT/ED TELECONSULT70: HCPCS | Mod: 95,,, | Performed by: PSYCHIATRY & NEUROLOGY

## 2021-07-08 PROCEDURE — 97530 THERAPEUTIC ACTIVITIES: CPT | Mod: CQ

## 2021-07-08 PROCEDURE — 83735 ASSAY OF MAGNESIUM: CPT | Performed by: STUDENT IN AN ORGANIZED HEALTH CARE EDUCATION/TRAINING PROGRAM

## 2021-07-08 PROCEDURE — 21400001 HC TELEMETRY ROOM

## 2021-07-08 PROCEDURE — 63600175 PHARM REV CODE 636 W HCPCS: Performed by: SURGERY

## 2021-07-08 PROCEDURE — 94761 N-INVAS EAR/PLS OXIMETRY MLT: CPT

## 2021-07-08 RX ORDER — DIPHENHYDRAMINE HYDROCHLORIDE 50 MG/ML
50 INJECTION INTRAMUSCULAR; INTRAVENOUS ONCE
Status: COMPLETED | OUTPATIENT
Start: 2021-07-08 | End: 2021-07-08

## 2021-07-08 RX ADMIN — LOSARTAN POTASSIUM 100 MG: 50 TABLET, FILM COATED ORAL at 08:07

## 2021-07-08 RX ADMIN — OXYCODONE AND ACETAMINOPHEN 1 TABLET: 10; 325 TABLET ORAL at 12:07

## 2021-07-08 RX ADMIN — METOPROLOL TARTRATE 50 MG: 50 TABLET, FILM COATED ORAL at 08:07

## 2021-07-08 RX ADMIN — MUPIROCIN: 20 OINTMENT TOPICAL at 09:07

## 2021-07-08 RX ADMIN — MUPIROCIN: 20 OINTMENT TOPICAL at 08:07

## 2021-07-08 RX ADMIN — OXYCODONE AND ACETAMINOPHEN 1 TABLET: 10; 325 TABLET ORAL at 05:07

## 2021-07-08 RX ADMIN — OCTREOTIDE ACETATE 50 MCG: 100 INJECTION, SOLUTION INTRAVENOUS; SUBCUTANEOUS at 06:07

## 2021-07-08 RX ADMIN — ENOXAPARIN SODIUM 40 MG: 40 INJECTION SUBCUTANEOUS at 06:07

## 2021-07-08 RX ADMIN — IOHEXOL 100 ML: 350 INJECTION, SOLUTION INTRAVENOUS at 01:07

## 2021-07-08 RX ADMIN — DIPHENHYDRAMINE HYDROCHLORIDE 50 MG: 50 INJECTION INTRAMUSCULAR; INTRAVENOUS at 12:07

## 2021-07-08 RX ADMIN — OXYCODONE AND ACETAMINOPHEN 1 TABLET: 10; 325 TABLET ORAL at 10:07

## 2021-07-08 RX ADMIN — OCTREOTIDE ACETATE 50 MCG: 100 INJECTION, SOLUTION INTRAVENOUS; SUBCUTANEOUS at 09:07

## 2021-07-08 RX ADMIN — METOPROLOL TARTRATE 50 MG: 50 TABLET, FILM COATED ORAL at 09:07

## 2021-07-08 RX ADMIN — SODIUM PHOSPHATE, MONOBASIC, MONOHYDRATE AND SODIUM PHOSPHATE, DIBASIC, ANHYDROUS 30 MMOL: 276; 142 INJECTION, SOLUTION INTRAVENOUS at 01:07

## 2021-07-08 RX ADMIN — OXYCODONE AND ACETAMINOPHEN 1 TABLET: 10; 325 TABLET ORAL at 06:07

## 2021-07-08 RX ADMIN — OCTREOTIDE ACETATE 50 MCG: 100 INJECTION, SOLUTION INTRAVENOUS; SUBCUTANEOUS at 05:07

## 2021-07-08 RX ADMIN — AMLODIPINE BESYLATE 5 MG: 5 TABLET ORAL at 08:07

## 2021-07-08 RX ADMIN — ASPIRIN 81 MG CHEWABLE TABLET 81 MG: 81 TABLET CHEWABLE at 08:07

## 2021-07-08 RX ADMIN — ATORVASTATIN CALCIUM 40 MG: 40 TABLET, FILM COATED ORAL at 08:07

## 2021-07-08 RX ADMIN — OXYCODONE AND ACETAMINOPHEN 1 TABLET: 10; 325 TABLET ORAL at 08:07

## 2021-07-08 RX ADMIN — CLOPIDOGREL 75 MG: 75 TABLET, FILM COATED ORAL at 08:07

## 2021-07-09 VITALS
HEART RATE: 77 BPM | SYSTOLIC BLOOD PRESSURE: 141 MMHG | WEIGHT: 200.38 LBS | RESPIRATION RATE: 18 BRPM | OXYGEN SATURATION: 97 % | HEIGHT: 66 IN | DIASTOLIC BLOOD PRESSURE: 65 MMHG | TEMPERATURE: 99 F | BODY MASS INDEX: 32.2 KG/M2

## 2021-07-09 PROBLEM — G93.40 ACUTE ENCEPHALOPATHY: Status: RESOLVED | Noted: 2021-07-06 | Resolved: 2021-07-09

## 2021-07-09 LAB
ALBUMIN SERPL BCP-MCNC: 2 G/DL (ref 3.5–5.2)
ALP SERPL-CCNC: 138 U/L (ref 55–135)
ALT SERPL W/O P-5'-P-CCNC: 7 U/L (ref 10–44)
ANION GAP SERPL CALC-SCNC: 10 MMOL/L (ref 8–16)
AST SERPL-CCNC: 13 U/L (ref 10–40)
BASOPHILS # BLD AUTO: 0.01 K/UL (ref 0–0.2)
BASOPHILS NFR BLD: 0.2 % (ref 0–1.9)
BILIRUB SERPL-MCNC: 0.3 MG/DL (ref 0.1–1)
BUN SERPL-MCNC: 12 MG/DL (ref 8–23)
CALCIUM SERPL-MCNC: 7.9 MG/DL (ref 8.7–10.5)
CHLORIDE SERPL-SCNC: 104 MMOL/L (ref 95–110)
CO2 SERPL-SCNC: 25 MMOL/L (ref 23–29)
CREAT SERPL-MCNC: 0.9 MG/DL (ref 0.5–1.4)
DIFFERENTIAL METHOD: ABNORMAL
EOSINOPHIL # BLD AUTO: 0.1 K/UL (ref 0–0.5)
EOSINOPHIL NFR BLD: 1.5 % (ref 0–8)
ERYTHROCYTE [DISTWIDTH] IN BLOOD BY AUTOMATED COUNT: 14.3 % (ref 11.5–14.5)
EST. GFR  (AFRICAN AMERICAN): >60 ML/MIN/1.73 M^2
EST. GFR  (NON AFRICAN AMERICAN): >60 ML/MIN/1.73 M^2
GLUCOSE SERPL-MCNC: 133 MG/DL (ref 70–110)
HCT VFR BLD AUTO: 26.6 % (ref 37–48.5)
HGB BLD-MCNC: 8 G/DL (ref 12–16)
IMM GRANULOCYTES # BLD AUTO: 0.09 K/UL (ref 0–0.04)
IMM GRANULOCYTES NFR BLD AUTO: 1.9 % (ref 0–0.5)
LYMPHOCYTES # BLD AUTO: 0.9 K/UL (ref 1–4.8)
LYMPHOCYTES NFR BLD: 18.7 % (ref 18–48)
MAGNESIUM SERPL-MCNC: 1.7 MG/DL (ref 1.6–2.6)
MCH RBC QN AUTO: 27.6 PG (ref 27–31)
MCHC RBC AUTO-ENTMCNC: 30.1 G/DL (ref 32–36)
MCV RBC AUTO: 92 FL (ref 82–98)
MONOCYTES # BLD AUTO: 0.6 K/UL (ref 0.3–1)
MONOCYTES NFR BLD: 12.9 % (ref 4–15)
NEUTROPHILS # BLD AUTO: 3.1 K/UL (ref 1.8–7.7)
NEUTROPHILS NFR BLD: 64.8 % (ref 38–73)
NRBC BLD-RTO: 0 /100 WBC
PHOSPHATE SERPL-MCNC: 2.5 MG/DL (ref 2.7–4.5)
PLATELET # BLD AUTO: 288 K/UL (ref 150–450)
PMV BLD AUTO: 11 FL (ref 9.2–12.9)
POTASSIUM SERPL-SCNC: 3.7 MMOL/L (ref 3.5–5.1)
PROT SERPL-MCNC: 5.3 G/DL (ref 6–8.4)
RBC # BLD AUTO: 2.9 M/UL (ref 4–5.4)
SODIUM SERPL-SCNC: 139 MMOL/L (ref 136–145)
WBC # BLD AUTO: 4.81 K/UL (ref 3.9–12.7)

## 2021-07-09 PROCEDURE — 85025 COMPLETE CBC W/AUTO DIFF WBC: CPT | Performed by: STUDENT IN AN ORGANIZED HEALTH CARE EDUCATION/TRAINING PROGRAM

## 2021-07-09 PROCEDURE — 25000003 PHARM REV CODE 250: Performed by: STUDENT IN AN ORGANIZED HEALTH CARE EDUCATION/TRAINING PROGRAM

## 2021-07-09 PROCEDURE — 83735 ASSAY OF MAGNESIUM: CPT | Performed by: STUDENT IN AN ORGANIZED HEALTH CARE EDUCATION/TRAINING PROGRAM

## 2021-07-09 PROCEDURE — 97530 THERAPEUTIC ACTIVITIES: CPT | Mod: CO

## 2021-07-09 PROCEDURE — 80053 COMPREHEN METABOLIC PANEL: CPT | Performed by: STUDENT IN AN ORGANIZED HEALTH CARE EDUCATION/TRAINING PROGRAM

## 2021-07-09 PROCEDURE — 36415 COLL VENOUS BLD VENIPUNCTURE: CPT | Performed by: STUDENT IN AN ORGANIZED HEALTH CARE EDUCATION/TRAINING PROGRAM

## 2021-07-09 PROCEDURE — 94761 N-INVAS EAR/PLS OXIMETRY MLT: CPT

## 2021-07-09 PROCEDURE — 84100 ASSAY OF PHOSPHORUS: CPT | Performed by: STUDENT IN AN ORGANIZED HEALTH CARE EDUCATION/TRAINING PROGRAM

## 2021-07-09 PROCEDURE — 25000003 PHARM REV CODE 250: Performed by: INTERNAL MEDICINE

## 2021-07-09 PROCEDURE — 63600175 PHARM REV CODE 636 W HCPCS: Mod: JB | Performed by: STUDENT IN AN ORGANIZED HEALTH CARE EDUCATION/TRAINING PROGRAM

## 2021-07-09 PROCEDURE — 97535 SELF CARE MNGMENT TRAINING: CPT | Mod: CO

## 2021-07-09 RX ORDER — OXYCODONE AND ACETAMINOPHEN 5; 325 MG/1; MG/1
1 TABLET ORAL EVERY 6 HOURS PRN
Qty: 15 TABLET | Refills: 0 | Status: SHIPPED | OUTPATIENT
Start: 2021-07-09 | End: 2021-10-05

## 2021-07-09 RX ORDER — MAGNESIUM SULFATE HEPTAHYDRATE 40 MG/ML
2 INJECTION, SOLUTION INTRAVENOUS ONCE
Status: COMPLETED | OUTPATIENT
Start: 2021-07-09 | End: 2021-07-09

## 2021-07-09 RX ADMIN — MAGNESIUM SULFATE IN WATER 2 G: 40 INJECTION, SOLUTION INTRAVENOUS at 09:07

## 2021-07-09 RX ADMIN — OCTREOTIDE ACETATE 50 MCG: 100 INJECTION, SOLUTION INTRAVENOUS; SUBCUTANEOUS at 06:07

## 2021-07-09 RX ADMIN — ASPIRIN 81 MG CHEWABLE TABLET 81 MG: 81 TABLET CHEWABLE at 09:07

## 2021-07-09 RX ADMIN — OXYCODONE AND ACETAMINOPHEN 1 TABLET: 10; 325 TABLET ORAL at 05:07

## 2021-07-09 RX ADMIN — ATORVASTATIN CALCIUM 40 MG: 40 TABLET, FILM COATED ORAL at 09:07

## 2021-07-09 RX ADMIN — AMLODIPINE BESYLATE 5 MG: 5 TABLET ORAL at 09:07

## 2021-07-09 RX ADMIN — CLOPIDOGREL 75 MG: 75 TABLET, FILM COATED ORAL at 09:07

## 2021-07-09 RX ADMIN — OXYCODONE AND ACETAMINOPHEN 1 TABLET: 10; 325 TABLET ORAL at 06:07

## 2021-07-09 RX ADMIN — OXYCODONE AND ACETAMINOPHEN 1 TABLET: 10; 325 TABLET ORAL at 02:07

## 2021-07-09 RX ADMIN — LOSARTAN POTASSIUM 100 MG: 50 TABLET, FILM COATED ORAL at 09:07

## 2021-07-09 RX ADMIN — METOPROLOL TARTRATE 50 MG: 50 TABLET, FILM COATED ORAL at 09:07

## 2021-07-09 RX ADMIN — POTASSIUM PHOSPHATE, MONOBASIC AND POTASSIUM PHOSPHATE, DIBASIC 30 MMOL: 224; 236 INJECTION, SOLUTION, CONCENTRATE INTRAVENOUS at 12:07

## 2021-07-09 RX ADMIN — OXYCODONE AND ACETAMINOPHEN 1 TABLET: 10; 325 TABLET ORAL at 11:07

## 2021-07-12 ENCOUNTER — TELEPHONE (OUTPATIENT)
Dept: NEUROLOGY | Facility: HOSPITAL | Age: 71
End: 2021-07-12

## 2021-07-15 ENCOUNTER — LAB VISIT (OUTPATIENT)
Dept: LAB | Facility: OTHER | Age: 71
End: 2021-07-15
Payer: MEDICARE

## 2021-07-15 DIAGNOSIS — Z20.822 ENCOUNTER FOR LABORATORY TESTING FOR COVID-19 VIRUS: ICD-10-CM

## 2021-07-15 DIAGNOSIS — C7B.8 SECONDARY NEUROENDOCRINE TUMOR OF LIVER: Primary | ICD-10-CM

## 2021-07-15 DIAGNOSIS — C7A.8 NEUROENDOCRINE CARCINOMA, UNKNOWN PRIMARY SITE: ICD-10-CM

## 2021-07-15 PROCEDURE — U0003 INFECTIOUS AGENT DETECTION BY NUCLEIC ACID (DNA OR RNA); SEVERE ACUTE RESPIRATORY SYNDROME CORONAVIRUS 2 (SARS-COV-2) (CORONAVIRUS DISEASE [COVID-19]), AMPLIFIED PROBE TECHNIQUE, MAKING USE OF HIGH THROUGHPUT TECHNOLOGIES AS DESCRIBED BY CMS-2020-01-R: HCPCS | Performed by: NURSE PRACTITIONER

## 2021-07-16 LAB
SARS-COV-2 RNA RESP QL NAA+PROBE: NOT DETECTED
SARS-COV-2- CYCLE NUMBER: -1

## 2021-07-16 RX ORDER — OXYCODONE AND ACETAMINOPHEN 5; 325 MG/1; MG/1
1 TABLET ORAL EVERY 6 HOURS PRN
Qty: 15 TABLET | Refills: 0 | Status: CANCELLED | OUTPATIENT
Start: 2021-07-16

## 2021-07-16 RX ORDER — OXYCODONE AND ACETAMINOPHEN 7.5; 325 MG/1; MG/1
1 TABLET ORAL EVERY 4 HOURS PRN
Qty: 15 TABLET | Refills: 0 | Status: SHIPPED | OUTPATIENT
Start: 2021-07-16 | End: 2021-10-05

## 2021-07-21 ENCOUNTER — TELEPHONE (OUTPATIENT)
Dept: NEUROLOGY | Facility: HOSPITAL | Age: 71
End: 2021-07-21

## 2021-07-22 ENCOUNTER — LAB VISIT (OUTPATIENT)
Dept: LAB | Facility: OTHER | Age: 71
End: 2021-07-22
Payer: MEDICARE

## 2021-07-22 DIAGNOSIS — Z20.822 ENCOUNTER FOR LABORATORY TESTING FOR COVID-19 VIRUS: ICD-10-CM

## 2021-07-22 PROCEDURE — U0003 INFECTIOUS AGENT DETECTION BY NUCLEIC ACID (DNA OR RNA); SEVERE ACUTE RESPIRATORY SYNDROME CORONAVIRUS 2 (SARS-COV-2) (CORONAVIRUS DISEASE [COVID-19]), AMPLIFIED PROBE TECHNIQUE, MAKING USE OF HIGH THROUGHPUT TECHNOLOGIES AS DESCRIBED BY CMS-2020-01-R: HCPCS | Performed by: NURSE PRACTITIONER

## 2021-07-24 LAB
SARS-COV-2 RNA RESP QL NAA+PROBE: NOT DETECTED
SARS-COV-2- CYCLE NUMBER: -1

## 2021-07-31 ENCOUNTER — EXTERNAL CHRONIC CARE MANAGEMENT (OUTPATIENT)
Dept: PRIMARY CARE CLINIC | Facility: CLINIC | Age: 71
End: 2021-07-31
Payer: MEDICARE

## 2021-07-31 PROCEDURE — 99490 PR CHRONIC CARE MGMT, 1ST 20 MIN: ICD-10-PCS | Mod: S$GLB,,, | Performed by: INTERNAL MEDICINE

## 2021-07-31 PROCEDURE — 99490 CHRNC CARE MGMT STAFF 1ST 20: CPT | Mod: S$GLB,,, | Performed by: INTERNAL MEDICINE

## 2021-08-06 ENCOUNTER — HOSPITAL ENCOUNTER (OUTPATIENT)
Facility: HOSPITAL | Age: 71
Discharge: HOME | End: 2021-08-08
Attending: EMERGENCY MEDICINE | Admitting: SURGERY
Payer: MEDICARE

## 2021-08-06 DIAGNOSIS — C7B.09 CARCINOID TUMOR METASTATIC TO INTRA-ABDOMINAL LYMPH NODE: ICD-10-CM

## 2021-08-06 DIAGNOSIS — N39.0 URINARY TRACT INFECTION WITHOUT HEMATURIA, SITE UNSPECIFIED: Primary | ICD-10-CM

## 2021-08-06 DIAGNOSIS — C7B.02 METASTATIC MALIGNANT CARCINOID TUMOR TO LIVER: ICD-10-CM

## 2021-08-06 DIAGNOSIS — R10.11 RIGHT UPPER QUADRANT ABDOMINAL PAIN: ICD-10-CM

## 2021-08-06 DIAGNOSIS — R79.89 ELEVATED LFTS: ICD-10-CM

## 2021-08-06 DIAGNOSIS — R50.9 FEVER, UNSPECIFIED FEVER CAUSE: ICD-10-CM

## 2021-08-06 DIAGNOSIS — C7A.00 CARCINOID TUMOR METASTATIC TO INTRA-ABDOMINAL LYMPH NODE: ICD-10-CM

## 2021-08-06 DIAGNOSIS — R50.9 FEVER: ICD-10-CM

## 2021-08-06 LAB
ALBUMIN SERPL BCP-MCNC: 3.1 G/DL (ref 3.5–5.2)
ALP SERPL-CCNC: 576 U/L (ref 55–135)
ALT SERPL W/O P-5'-P-CCNC: 140 U/L (ref 10–44)
ANION GAP SERPL CALC-SCNC: 14 MMOL/L (ref 8–16)
AST SERPL-CCNC: 466 U/L (ref 10–40)
BASOPHILS # BLD AUTO: 0.01 K/UL (ref 0–0.2)
BASOPHILS NFR BLD: 0.1 % (ref 0–1.9)
BILIRUB SERPL-MCNC: 1.3 MG/DL (ref 0.1–1)
BUN SERPL-MCNC: 16 MG/DL (ref 8–23)
CALCIUM SERPL-MCNC: 9.5 MG/DL (ref 8.7–10.5)
CHLORIDE SERPL-SCNC: 108 MMOL/L (ref 95–110)
CO2 SERPL-SCNC: 19 MMOL/L (ref 23–29)
CREAT SERPL-MCNC: 1.5 MG/DL (ref 0.5–1.4)
CTP QC/QA: YES
DIFFERENTIAL METHOD: ABNORMAL
EOSINOPHIL # BLD AUTO: 0 K/UL (ref 0–0.5)
EOSINOPHIL NFR BLD: 0.1 % (ref 0–8)
ERYTHROCYTE [DISTWIDTH] IN BLOOD BY AUTOMATED COUNT: 13.8 % (ref 11.5–14.5)
EST. GFR  (AFRICAN AMERICAN): 41 ML/MIN/1.73 M^2
EST. GFR  (NON AFRICAN AMERICAN): 35 ML/MIN/1.73 M^2
GLUCOSE SERPL-MCNC: 129 MG/DL (ref 70–110)
HCT VFR BLD AUTO: 30.1 % (ref 37–48.5)
HGB BLD-MCNC: 9.4 G/DL (ref 12–16)
IMM GRANULOCYTES # BLD AUTO: 0.06 K/UL (ref 0–0.04)
IMM GRANULOCYTES NFR BLD AUTO: 0.8 % (ref 0–0.5)
LACTATE SERPL-SCNC: 2.7 MMOL/L (ref 0.5–2.2)
LYMPHOCYTES # BLD AUTO: 0.2 K/UL (ref 1–4.8)
LYMPHOCYTES NFR BLD: 2.6 % (ref 18–48)
MCH RBC QN AUTO: 28.7 PG (ref 27–31)
MCHC RBC AUTO-ENTMCNC: 31.2 G/DL (ref 32–36)
MCV RBC AUTO: 92 FL (ref 82–98)
MONOCYTES # BLD AUTO: 0.2 K/UL (ref 0.3–1)
MONOCYTES NFR BLD: 3 % (ref 4–15)
NEUTROPHILS # BLD AUTO: 6.8 K/UL (ref 1.8–7.7)
NEUTROPHILS NFR BLD: 93.4 % (ref 38–73)
NRBC BLD-RTO: 0 /100 WBC
PLATELET # BLD AUTO: 182 K/UL (ref 150–450)
PMV BLD AUTO: 11.2 FL (ref 9.2–12.9)
POTASSIUM SERPL-SCNC: 3.1 MMOL/L (ref 3.5–5.1)
PROCALCITONIN SERPL IA-MCNC: 15.94 NG/ML
PROT SERPL-MCNC: 6.9 G/DL (ref 6–8.4)
RBC # BLD AUTO: 3.28 M/UL (ref 4–5.4)
SARS-COV-2 RDRP RESP QL NAA+PROBE: NEGATIVE
SODIUM SERPL-SCNC: 141 MMOL/L (ref 136–145)
WBC # BLD AUTO: 7.32 K/UL (ref 3.9–12.7)

## 2021-08-06 PROCEDURE — 87186 SC STD MICRODIL/AGAR DIL: CPT | Performed by: STUDENT IN AN ORGANIZED HEALTH CARE EDUCATION/TRAINING PROGRAM

## 2021-08-06 PROCEDURE — U0002 COVID-19 LAB TEST NON-CDC: HCPCS | Performed by: EMERGENCY MEDICINE

## 2021-08-06 PROCEDURE — 87040 BLOOD CULTURE FOR BACTERIA: CPT | Performed by: STUDENT IN AN ORGANIZED HEALTH CARE EDUCATION/TRAINING PROGRAM

## 2021-08-06 PROCEDURE — 99285 EMERGENCY DEPT VISIT HI MDM: CPT | Mod: 25

## 2021-08-06 PROCEDURE — 80053 COMPREHEN METABOLIC PANEL: CPT | Performed by: STUDENT IN AN ORGANIZED HEALTH CARE EDUCATION/TRAINING PROGRAM

## 2021-08-06 PROCEDURE — 87077 CULTURE AEROBIC IDENTIFY: CPT | Performed by: STUDENT IN AN ORGANIZED HEALTH CARE EDUCATION/TRAINING PROGRAM

## 2021-08-06 PROCEDURE — 84145 PROCALCITONIN (PCT): CPT | Performed by: EMERGENCY MEDICINE

## 2021-08-06 PROCEDURE — 63600175 PHARM REV CODE 636 W HCPCS: Performed by: STUDENT IN AN ORGANIZED HEALTH CARE EDUCATION/TRAINING PROGRAM

## 2021-08-06 PROCEDURE — 93010 EKG 12-LEAD: ICD-10-PCS | Mod: ,,, | Performed by: INTERNAL MEDICINE

## 2021-08-06 PROCEDURE — 25000003 PHARM REV CODE 250: Performed by: STUDENT IN AN ORGANIZED HEALTH CARE EDUCATION/TRAINING PROGRAM

## 2021-08-06 PROCEDURE — 83605 ASSAY OF LACTIC ACID: CPT | Performed by: STUDENT IN AN ORGANIZED HEALTH CARE EDUCATION/TRAINING PROGRAM

## 2021-08-06 PROCEDURE — 93005 ELECTROCARDIOGRAM TRACING: CPT

## 2021-08-06 PROCEDURE — 96361 HYDRATE IV INFUSION ADD-ON: CPT

## 2021-08-06 PROCEDURE — G0378 HOSPITAL OBSERVATION PER HR: HCPCS

## 2021-08-06 PROCEDURE — 93010 ELECTROCARDIOGRAM REPORT: CPT | Mod: ,,, | Performed by: INTERNAL MEDICINE

## 2021-08-06 PROCEDURE — 85025 COMPLETE CBC W/AUTO DIFF WBC: CPT | Performed by: STUDENT IN AN ORGANIZED HEALTH CARE EDUCATION/TRAINING PROGRAM

## 2021-08-06 RX ORDER — SODIUM CHLORIDE, SODIUM LACTATE, POTASSIUM CHLORIDE, CALCIUM CHLORIDE 600; 310; 30; 20 MG/100ML; MG/100ML; MG/100ML; MG/100ML
INJECTION, SOLUTION INTRAVENOUS CONTINUOUS
Status: DISCONTINUED | OUTPATIENT
Start: 2021-08-06 | End: 2021-08-08 | Stop reason: HOSPADM

## 2021-08-06 RX ORDER — DIPHENHYDRAMINE HYDROCHLORIDE 50 MG/ML
50 INJECTION INTRAMUSCULAR; INTRAVENOUS
Status: COMPLETED | OUTPATIENT
Start: 2021-08-06 | End: 2021-08-07

## 2021-08-06 RX ORDER — ENOXAPARIN SODIUM 100 MG/ML
40 INJECTION SUBCUTANEOUS EVERY 24 HOURS
Status: DISCONTINUED | OUTPATIENT
Start: 2021-08-06 | End: 2021-08-08 | Stop reason: HOSPADM

## 2021-08-06 RX ORDER — SODIUM CHLORIDE 0.9 % (FLUSH) 0.9 %
10 SYRINGE (ML) INJECTION
Status: DISCONTINUED | OUTPATIENT
Start: 2021-08-06 | End: 2021-08-08 | Stop reason: HOSPADM

## 2021-08-06 RX ORDER — ACETAMINOPHEN 500 MG
1000 TABLET ORAL
Status: COMPLETED | OUTPATIENT
Start: 2021-08-06 | End: 2021-08-06

## 2021-08-06 RX ORDER — ONDANSETRON 8 MG/1
8 TABLET, ORALLY DISINTEGRATING ORAL EVERY 8 HOURS PRN
Status: DISCONTINUED | OUTPATIENT
Start: 2021-08-06 | End: 2021-08-08 | Stop reason: HOSPADM

## 2021-08-06 RX ORDER — ACETAMINOPHEN 325 MG/1
650 TABLET ORAL EVERY 4 HOURS PRN
Status: DISCONTINUED | OUTPATIENT
Start: 2021-08-06 | End: 2021-08-08 | Stop reason: HOSPADM

## 2021-08-06 RX ORDER — OXYCODONE HYDROCHLORIDE 5 MG/1
5 TABLET ORAL EVERY 4 HOURS PRN
Status: DISCONTINUED | OUTPATIENT
Start: 2021-08-06 | End: 2021-08-08 | Stop reason: HOSPADM

## 2021-08-06 RX ORDER — TALC
6 POWDER (GRAM) TOPICAL NIGHTLY PRN
Status: DISCONTINUED | OUTPATIENT
Start: 2021-08-06 | End: 2021-08-08 | Stop reason: HOSPADM

## 2021-08-06 RX ORDER — OXYCODONE HYDROCHLORIDE 5 MG/1
10 TABLET ORAL EVERY 4 HOURS PRN
Status: DISCONTINUED | OUTPATIENT
Start: 2021-08-06 | End: 2021-08-07

## 2021-08-06 RX ORDER — ACETAMINOPHEN 325 MG/1
650 TABLET ORAL EVERY 8 HOURS PRN
Status: DISCONTINUED | OUTPATIENT
Start: 2021-08-06 | End: 2021-08-08 | Stop reason: HOSPADM

## 2021-08-06 RX ORDER — LIDOCAINE HYDROCHLORIDE 10 MG/ML
1 INJECTION, SOLUTION EPIDURAL; INFILTRATION; INTRACAUDAL; PERINEURAL ONCE
Status: DISCONTINUED | OUTPATIENT
Start: 2021-08-06 | End: 2021-08-08 | Stop reason: HOSPADM

## 2021-08-06 RX ADMIN — ACETAMINOPHEN 1000 MG: 500 TABLET ORAL at 09:08

## 2021-08-06 RX ADMIN — SODIUM CHLORIDE, SODIUM LACTATE, POTASSIUM CHLORIDE, AND CALCIUM CHLORIDE 1000 ML: .6; .31; .03; .02 INJECTION, SOLUTION INTRAVENOUS at 09:08

## 2021-08-07 LAB
ALBUMIN SERPL BCP-MCNC: 2.7 G/DL (ref 3.5–5.2)
ALP SERPL-CCNC: 443 U/L (ref 55–135)
ALT SERPL W/O P-5'-P-CCNC: 131 U/L (ref 10–44)
ANION GAP SERPL CALC-SCNC: 14 MMOL/L (ref 8–16)
AST SERPL-CCNC: 246 U/L (ref 10–40)
BACTERIA #/AREA URNS HPF: ABNORMAL /HPF
BASOPHILS # BLD AUTO: 0.01 K/UL (ref 0–0.2)
BASOPHILS NFR BLD: 0.1 % (ref 0–1.9)
BILIRUB SERPL-MCNC: 1.4 MG/DL (ref 0.1–1)
BILIRUB UR QL STRIP: NEGATIVE
BUN SERPL-MCNC: 15 MG/DL (ref 8–23)
CALCIUM SERPL-MCNC: 8.9 MG/DL (ref 8.7–10.5)
CHLORIDE SERPL-SCNC: 107 MMOL/L (ref 95–110)
CLARITY UR: ABNORMAL
CO2 SERPL-SCNC: 18 MMOL/L (ref 23–29)
COLOR UR: YELLOW
CREAT SERPL-MCNC: 1.3 MG/DL (ref 0.5–1.4)
DIFFERENTIAL METHOD: ABNORMAL
EOSINOPHIL # BLD AUTO: 0 K/UL (ref 0–0.5)
EOSINOPHIL NFR BLD: 0 % (ref 0–8)
ERYTHROCYTE [DISTWIDTH] IN BLOOD BY AUTOMATED COUNT: 14.1 % (ref 11.5–14.5)
EST. GFR  (AFRICAN AMERICAN): 48 ML/MIN/1.73 M^2
EST. GFR  (NON AFRICAN AMERICAN): 42 ML/MIN/1.73 M^2
GLUCOSE SERPL-MCNC: 156 MG/DL (ref 70–110)
GLUCOSE UR QL STRIP: NEGATIVE
HCT VFR BLD AUTO: 27.3 % (ref 37–48.5)
HGB BLD-MCNC: 8.3 G/DL (ref 12–16)
HGB UR QL STRIP: NEGATIVE
IMM GRANULOCYTES # BLD AUTO: 0.11 K/UL (ref 0–0.04)
IMM GRANULOCYTES NFR BLD AUTO: 0.6 % (ref 0–0.5)
KETONES UR QL STRIP: NEGATIVE
LACTATE SERPL-SCNC: 2 MMOL/L (ref 0.5–2.2)
LEUKOCYTE ESTERASE UR QL STRIP: ABNORMAL
LYMPHOCYTES # BLD AUTO: 0.3 K/UL (ref 1–4.8)
LYMPHOCYTES NFR BLD: 1.5 % (ref 18–48)
MAGNESIUM SERPL-MCNC: 1.2 MG/DL (ref 1.6–2.6)
MCH RBC QN AUTO: 27.8 PG (ref 27–31)
MCHC RBC AUTO-ENTMCNC: 30.4 G/DL (ref 32–36)
MCV RBC AUTO: 91 FL (ref 82–98)
MICROSCOPIC COMMENT: ABNORMAL
MONOCYTES # BLD AUTO: 0.5 K/UL (ref 0.3–1)
MONOCYTES NFR BLD: 2.6 % (ref 4–15)
NEUTROPHILS # BLD AUTO: 17.4 K/UL (ref 1.8–7.7)
NEUTROPHILS NFR BLD: 95.2 % (ref 38–73)
NITRITE UR QL STRIP: NEGATIVE
NRBC BLD-RTO: 0 /100 WBC
PH UR STRIP: 6 [PH] (ref 5–8)
PHOSPHATE SERPL-MCNC: 3.7 MG/DL (ref 2.7–4.5)
PLATELET # BLD AUTO: 176 K/UL (ref 150–450)
PMV BLD AUTO: 10.9 FL (ref 9.2–12.9)
POTASSIUM SERPL-SCNC: 3.4 MMOL/L (ref 3.5–5.1)
PROT SERPL-MCNC: 6.1 G/DL (ref 6–8.4)
PROT UR QL STRIP: NEGATIVE
RBC # BLD AUTO: 2.99 M/UL (ref 4–5.4)
SODIUM SERPL-SCNC: 139 MMOL/L (ref 136–145)
SP GR UR STRIP: 1.01 (ref 1–1.03)
URN SPEC COLLECT METH UR: ABNORMAL
UROBILINOGEN UR STRIP-ACNC: NEGATIVE EU/DL
WBC # BLD AUTO: 18.3 K/UL (ref 3.9–12.7)
WBC #/AREA URNS HPF: 30 /HPF (ref 0–5)

## 2021-08-07 PROCEDURE — 83735 ASSAY OF MAGNESIUM: CPT | Performed by: STUDENT IN AN ORGANIZED HEALTH CARE EDUCATION/TRAINING PROGRAM

## 2021-08-07 PROCEDURE — 63600175 PHARM REV CODE 636 W HCPCS: Performed by: STUDENT IN AN ORGANIZED HEALTH CARE EDUCATION/TRAINING PROGRAM

## 2021-08-07 PROCEDURE — 96365 THER/PROPH/DIAG IV INF INIT: CPT

## 2021-08-07 PROCEDURE — 84100 ASSAY OF PHOSPHORUS: CPT | Performed by: STUDENT IN AN ORGANIZED HEALTH CARE EDUCATION/TRAINING PROGRAM

## 2021-08-07 PROCEDURE — 94761 N-INVAS EAR/PLS OXIMETRY MLT: CPT

## 2021-08-07 PROCEDURE — 25500020 PHARM REV CODE 255: Performed by: EMERGENCY MEDICINE

## 2021-08-07 PROCEDURE — G0378 HOSPITAL OBSERVATION PER HR: HCPCS

## 2021-08-07 PROCEDURE — 80053 COMPREHEN METABOLIC PANEL: CPT | Performed by: STUDENT IN AN ORGANIZED HEALTH CARE EDUCATION/TRAINING PROGRAM

## 2021-08-07 PROCEDURE — 96372 THER/PROPH/DIAG INJ SC/IM: CPT | Mod: 59

## 2021-08-07 PROCEDURE — 85025 COMPLETE CBC W/AUTO DIFF WBC: CPT | Performed by: STUDENT IN AN ORGANIZED HEALTH CARE EDUCATION/TRAINING PROGRAM

## 2021-08-07 PROCEDURE — 83605 ASSAY OF LACTIC ACID: CPT | Performed by: STUDENT IN AN ORGANIZED HEALTH CARE EDUCATION/TRAINING PROGRAM

## 2021-08-07 PROCEDURE — 25000003 PHARM REV CODE 250: Performed by: STUDENT IN AN ORGANIZED HEALTH CARE EDUCATION/TRAINING PROGRAM

## 2021-08-07 PROCEDURE — 36415 COLL VENOUS BLD VENIPUNCTURE: CPT | Performed by: STUDENT IN AN ORGANIZED HEALTH CARE EDUCATION/TRAINING PROGRAM

## 2021-08-07 PROCEDURE — 87086 URINE CULTURE/COLONY COUNT: CPT | Performed by: STUDENT IN AN ORGANIZED HEALTH CARE EDUCATION/TRAINING PROGRAM

## 2021-08-07 PROCEDURE — 96375 TX/PRO/DX INJ NEW DRUG ADDON: CPT

## 2021-08-07 PROCEDURE — 87077 CULTURE AEROBIC IDENTIFY: CPT | Performed by: STUDENT IN AN ORGANIZED HEALTH CARE EDUCATION/TRAINING PROGRAM

## 2021-08-07 PROCEDURE — 87088 URINE BACTERIA CULTURE: CPT | Performed by: STUDENT IN AN ORGANIZED HEALTH CARE EDUCATION/TRAINING PROGRAM

## 2021-08-07 PROCEDURE — 96368 THER/DIAG CONCURRENT INF: CPT

## 2021-08-07 PROCEDURE — 96366 THER/PROPH/DIAG IV INF ADDON: CPT

## 2021-08-07 PROCEDURE — 96376 TX/PRO/DX INJ SAME DRUG ADON: CPT

## 2021-08-07 PROCEDURE — 81000 URINALYSIS NONAUTO W/SCOPE: CPT | Performed by: STUDENT IN AN ORGANIZED HEALTH CARE EDUCATION/TRAINING PROGRAM

## 2021-08-07 PROCEDURE — 87186 SC STD MICRODIL/AGAR DIL: CPT | Performed by: STUDENT IN AN ORGANIZED HEALTH CARE EDUCATION/TRAINING PROGRAM

## 2021-08-07 PROCEDURE — 96361 HYDRATE IV INFUSION ADD-ON: CPT

## 2021-08-07 RX ORDER — LOSARTAN POTASSIUM 50 MG/1
100 TABLET ORAL DAILY
Status: DISCONTINUED | OUTPATIENT
Start: 2021-08-07 | End: 2021-08-08 | Stop reason: HOSPADM

## 2021-08-07 RX ORDER — ATORVASTATIN CALCIUM 40 MG/1
40 TABLET, FILM COATED ORAL DAILY
Status: DISCONTINUED | OUTPATIENT
Start: 2021-08-07 | End: 2021-08-08 | Stop reason: HOSPADM

## 2021-08-07 RX ORDER — OXYCODONE HYDROCHLORIDE 5 MG/1
10 TABLET ORAL ONCE
Status: COMPLETED | OUTPATIENT
Start: 2021-08-07 | End: 2021-08-07

## 2021-08-07 RX ORDER — MAGNESIUM SULFATE HEPTAHYDRATE 40 MG/ML
2 INJECTION, SOLUTION INTRAVENOUS
Status: COMPLETED | OUTPATIENT
Start: 2021-08-07 | End: 2021-08-07

## 2021-08-07 RX ORDER — METOPROLOL TARTRATE 25 MG/1
25 TABLET, FILM COATED ORAL 2 TIMES DAILY
Status: DISCONTINUED | OUTPATIENT
Start: 2021-08-07 | End: 2021-08-08 | Stop reason: HOSPADM

## 2021-08-07 RX ORDER — ONDANSETRON 2 MG/ML
4 INJECTION INTRAMUSCULAR; INTRAVENOUS EVERY 6 HOURS PRN
Status: DISCONTINUED | OUTPATIENT
Start: 2021-08-07 | End: 2021-08-08 | Stop reason: HOSPADM

## 2021-08-07 RX ADMIN — OXYCODONE HYDROCHLORIDE 10 MG: 5 TABLET ORAL at 07:08

## 2021-08-07 RX ADMIN — SODIUM CHLORIDE, SODIUM LACTATE, POTASSIUM CHLORIDE, AND CALCIUM CHLORIDE: .6; .31; .03; .02 INJECTION, SOLUTION INTRAVENOUS at 02:08

## 2021-08-07 RX ADMIN — ENOXAPARIN SODIUM 40 MG: 100 INJECTION SUBCUTANEOUS at 02:08

## 2021-08-07 RX ADMIN — LOSARTAN POTASSIUM 100 MG: 50 TABLET, FILM COATED ORAL at 09:08

## 2021-08-07 RX ADMIN — OXYCODONE HYDROCHLORIDE 10 MG: 5 TABLET ORAL at 12:08

## 2021-08-07 RX ADMIN — SODIUM CHLORIDE, SODIUM LACTATE, POTASSIUM CHLORIDE, AND CALCIUM CHLORIDE: .6; .31; .03; .02 INJECTION, SOLUTION INTRAVENOUS at 10:08

## 2021-08-07 RX ADMIN — OXYCODONE HYDROCHLORIDE 5 MG: 5 TABLET ORAL at 05:08

## 2021-08-07 RX ADMIN — MAGNESIUM SULFATE IN WATER 2 G: 40 INJECTION, SOLUTION INTRAVENOUS at 10:08

## 2021-08-07 RX ADMIN — ATORVASTATIN CALCIUM 40 MG: 40 TABLET, FILM COATED ORAL at 09:08

## 2021-08-07 RX ADMIN — SODIUM CHLORIDE, SODIUM LACTATE, POTASSIUM CHLORIDE, AND CALCIUM CHLORIDE: .6; .31; .03; .02 INJECTION, SOLUTION INTRAVENOUS at 06:08

## 2021-08-07 RX ADMIN — PIPERACILLIN AND TAZOBACTAM 4.5 G: 4; .5 INJECTION, POWDER, FOR SOLUTION INTRAVENOUS at 06:08

## 2021-08-07 RX ADMIN — PIPERACILLIN AND TAZOBACTAM 4.5 G: 4; .5 INJECTION, POWDER, FOR SOLUTION INTRAVENOUS at 02:08

## 2021-08-07 RX ADMIN — DIPHENHYDRAMINE HYDROCHLORIDE 50 MG: 50 INJECTION INTRAMUSCULAR; INTRAVENOUS at 12:08

## 2021-08-07 RX ADMIN — IOHEXOL 100 ML: 350 INJECTION, SOLUTION INTRAVENOUS at 12:08

## 2021-08-07 RX ADMIN — PIPERACILLIN AND TAZOBACTAM 4.5 G: 4; .5 INJECTION, POWDER, FOR SOLUTION INTRAVENOUS at 10:08

## 2021-08-07 RX ADMIN — MAGNESIUM SULFATE IN WATER 2 G: 40 INJECTION, SOLUTION INTRAVENOUS at 11:08

## 2021-08-07 RX ADMIN — OXYCODONE HYDROCHLORIDE 10 MG: 5 TABLET ORAL at 02:08

## 2021-08-07 RX ADMIN — METOPROLOL TARTRATE 25 MG: 25 TABLET, FILM COATED ORAL at 09:08

## 2021-08-07 RX ADMIN — OXYCODONE HYDROCHLORIDE 10 MG: 5 TABLET ORAL at 09:08

## 2021-08-07 RX ADMIN — HYDROCORTISONE SODIUM SUCCINATE 100 MG: 100 INJECTION, POWDER, FOR SOLUTION INTRAMUSCULAR; INTRAVENOUS at 12:08

## 2021-08-07 RX ADMIN — ENOXAPARIN SODIUM 40 MG: 100 INJECTION SUBCUTANEOUS at 05:08

## 2021-08-08 VITALS
DIASTOLIC BLOOD PRESSURE: 74 MMHG | HEIGHT: 66 IN | WEIGHT: 184.31 LBS | OXYGEN SATURATION: 95 % | HEART RATE: 87 BPM | TEMPERATURE: 98 F | RESPIRATION RATE: 14 BRPM | BODY MASS INDEX: 29.62 KG/M2 | SYSTOLIC BLOOD PRESSURE: 150 MMHG

## 2021-08-08 PROBLEM — R50.9 FEVER: Status: RESOLVED | Noted: 2021-08-06 | Resolved: 2021-08-08

## 2021-08-08 LAB
ALBUMIN SERPL BCP-MCNC: 2.4 G/DL (ref 3.5–5.2)
ALP SERPL-CCNC: 290 U/L (ref 55–135)
ALT SERPL W/O P-5'-P-CCNC: 76 U/L (ref 10–44)
ANION GAP SERPL CALC-SCNC: 12 MMOL/L (ref 8–16)
AST SERPL-CCNC: 57 U/L (ref 10–40)
BASOPHILS # BLD AUTO: 0.03 K/UL (ref 0–0.2)
BASOPHILS NFR BLD: 0.2 % (ref 0–1.9)
BILIRUB SERPL-MCNC: 0.7 MG/DL (ref 0.1–1)
BUN SERPL-MCNC: 16 MG/DL (ref 8–23)
CALCIUM SERPL-MCNC: 8.5 MG/DL (ref 8.7–10.5)
CHLORIDE SERPL-SCNC: 107 MMOL/L (ref 95–110)
CO2 SERPL-SCNC: 18 MMOL/L (ref 23–29)
CREAT SERPL-MCNC: 1.1 MG/DL (ref 0.5–1.4)
DIFFERENTIAL METHOD: ABNORMAL
EOSINOPHIL # BLD AUTO: 0.1 K/UL (ref 0–0.5)
EOSINOPHIL NFR BLD: 0.4 % (ref 0–8)
ERYTHROCYTE [DISTWIDTH] IN BLOOD BY AUTOMATED COUNT: 14.2 % (ref 11.5–14.5)
EST. GFR  (AFRICAN AMERICAN): 59 ML/MIN/1.73 M^2
EST. GFR  (NON AFRICAN AMERICAN): 51 ML/MIN/1.73 M^2
GLUCOSE SERPL-MCNC: 118 MG/DL (ref 70–110)
HCT VFR BLD AUTO: 27.3 % (ref 37–48.5)
HGB BLD-MCNC: 8.1 G/DL (ref 12–16)
IMM GRANULOCYTES # BLD AUTO: 0.09 K/UL (ref 0–0.04)
IMM GRANULOCYTES NFR BLD AUTO: 0.7 % (ref 0–0.5)
LYMPHOCYTES # BLD AUTO: 0.7 K/UL (ref 1–4.8)
LYMPHOCYTES NFR BLD: 5.4 % (ref 18–48)
MCH RBC QN AUTO: 28.6 PG (ref 27–31)
MCHC RBC AUTO-ENTMCNC: 29.7 G/DL (ref 32–36)
MCV RBC AUTO: 97 FL (ref 82–98)
MONOCYTES # BLD AUTO: 0.8 K/UL (ref 0.3–1)
MONOCYTES NFR BLD: 6.1 % (ref 4–15)
NEUTROPHILS # BLD AUTO: 11.4 K/UL (ref 1.8–7.7)
NEUTROPHILS NFR BLD: 87.2 % (ref 38–73)
NRBC BLD-RTO: 0 /100 WBC
PLATELET # BLD AUTO: 165 K/UL (ref 150–450)
PMV BLD AUTO: 10.9 FL (ref 9.2–12.9)
POTASSIUM SERPL-SCNC: 3.2 MMOL/L (ref 3.5–5.1)
PROT SERPL-MCNC: 5.9 G/DL (ref 6–8.4)
RBC # BLD AUTO: 2.83 M/UL (ref 4–5.4)
SODIUM SERPL-SCNC: 137 MMOL/L (ref 136–145)
WBC # BLD AUTO: 13.04 K/UL (ref 3.9–12.7)

## 2021-08-08 PROCEDURE — 36415 COLL VENOUS BLD VENIPUNCTURE: CPT | Performed by: STUDENT IN AN ORGANIZED HEALTH CARE EDUCATION/TRAINING PROGRAM

## 2021-08-08 PROCEDURE — 63600175 PHARM REV CODE 636 W HCPCS: Performed by: STUDENT IN AN ORGANIZED HEALTH CARE EDUCATION/TRAINING PROGRAM

## 2021-08-08 PROCEDURE — G0378 HOSPITAL OBSERVATION PER HR: HCPCS

## 2021-08-08 PROCEDURE — 97161 PT EVAL LOW COMPLEX 20 MIN: CPT

## 2021-08-08 PROCEDURE — 96361 HYDRATE IV INFUSION ADD-ON: CPT

## 2021-08-08 PROCEDURE — 94761 N-INVAS EAR/PLS OXIMETRY MLT: CPT

## 2021-08-08 PROCEDURE — 85025 COMPLETE CBC W/AUTO DIFF WBC: CPT | Performed by: STUDENT IN AN ORGANIZED HEALTH CARE EDUCATION/TRAINING PROGRAM

## 2021-08-08 PROCEDURE — 96376 TX/PRO/DX INJ SAME DRUG ADON: CPT

## 2021-08-08 PROCEDURE — 25000003 PHARM REV CODE 250: Performed by: STUDENT IN AN ORGANIZED HEALTH CARE EDUCATION/TRAINING PROGRAM

## 2021-08-08 PROCEDURE — 80053 COMPREHEN METABOLIC PANEL: CPT | Performed by: STUDENT IN AN ORGANIZED HEALTH CARE EDUCATION/TRAINING PROGRAM

## 2021-08-08 PROCEDURE — 97116 GAIT TRAINING THERAPY: CPT

## 2021-08-08 RX ORDER — CIPROFLOXACIN 500 MG/1
500 TABLET ORAL 2 TIMES DAILY
Qty: 10 TABLET | Refills: 0 | Status: SHIPPED | OUTPATIENT
Start: 2021-08-08 | End: 2021-08-13

## 2021-08-08 RX ORDER — FLUCONAZOLE 200 MG/1
200 TABLET ORAL DAILY
Qty: 5 TABLET | Refills: 0 | Status: SHIPPED | OUTPATIENT
Start: 2021-08-08 | End: 2021-08-08 | Stop reason: SDUPTHER

## 2021-08-08 RX ORDER — CIPROFLOXACIN 500 MG/1
500 TABLET ORAL 2 TIMES DAILY
Qty: 10 TABLET | Refills: 0 | Status: SHIPPED | OUTPATIENT
Start: 2021-08-08 | End: 2021-08-08 | Stop reason: SDUPTHER

## 2021-08-08 RX ORDER — POTASSIUM CHLORIDE 20 MEQ/1
40 TABLET, EXTENDED RELEASE ORAL ONCE
Status: COMPLETED | OUTPATIENT
Start: 2021-08-08 | End: 2021-08-08

## 2021-08-08 RX ORDER — FLUCONAZOLE 200 MG/1
200 TABLET ORAL DAILY
Qty: 5 TABLET | Refills: 0 | Status: SHIPPED | OUTPATIENT
Start: 2021-08-08 | End: 2021-08-12 | Stop reason: SDUPTHER

## 2021-08-08 RX ORDER — SIMETHICONE 125 MG
125 TABLET,CHEWABLE ORAL EVERY 6 HOURS PRN
Status: DISCONTINUED | OUTPATIENT
Start: 2021-08-08 | End: 2021-08-08 | Stop reason: HOSPADM

## 2021-08-08 RX ADMIN — OXYCODONE HYDROCHLORIDE 5 MG: 5 TABLET ORAL at 03:08

## 2021-08-08 RX ADMIN — OXYCODONE HYDROCHLORIDE 5 MG: 5 TABLET ORAL at 02:08

## 2021-08-08 RX ADMIN — METOPROLOL TARTRATE 25 MG: 25 TABLET, FILM COATED ORAL at 08:08

## 2021-08-08 RX ADMIN — POTASSIUM CHLORIDE 40 MEQ: 1500 TABLET, EXTENDED RELEASE ORAL at 01:08

## 2021-08-08 RX ADMIN — OXYCODONE HYDROCHLORIDE 5 MG: 5 TABLET ORAL at 09:08

## 2021-08-08 RX ADMIN — ATORVASTATIN CALCIUM 40 MG: 40 TABLET, FILM COATED ORAL at 08:08

## 2021-08-08 RX ADMIN — SODIUM CHLORIDE, SODIUM LACTATE, POTASSIUM CHLORIDE, AND CALCIUM CHLORIDE: .6; .31; .03; .02 INJECTION, SOLUTION INTRAVENOUS at 08:08

## 2021-08-08 RX ADMIN — SIMETHICONE 125 MG: 125 TABLET, CHEWABLE ORAL at 03:08

## 2021-08-08 RX ADMIN — LOSARTAN POTASSIUM 100 MG: 50 TABLET, FILM COATED ORAL at 08:08

## 2021-08-08 RX ADMIN — PIPERACILLIN AND TAZOBACTAM 4.5 G: 4; .5 INJECTION, POWDER, FOR SOLUTION INTRAVENOUS at 03:08

## 2021-08-09 DIAGNOSIS — N39.0 UTI (URINARY TRACT INFECTION): Primary | ICD-10-CM

## 2021-08-09 LAB — BACTERIA UR CULT: ABNORMAL

## 2021-08-09 RX ORDER — NITROFURANTOIN 25; 75 MG/1; MG/1
100 CAPSULE ORAL 2 TIMES DAILY
Qty: 14 CAPSULE | Refills: 0 | Status: CANCELLED | OUTPATIENT
Start: 2021-08-09 | End: 2021-08-16

## 2021-08-09 RX ORDER — NITROFURANTOIN 25; 75 MG/1; MG/1
100 CAPSULE ORAL 2 TIMES DAILY
Qty: 7 CAPSULE | Refills: 0 | Status: SHIPPED | OUTPATIENT
Start: 2021-08-09 | End: 2021-09-15 | Stop reason: SDUPTHER

## 2021-08-10 ENCOUNTER — CONFERENCE (OUTPATIENT)
Dept: NEUROLOGY | Facility: HOSPITAL | Age: 71
End: 2021-08-10

## 2021-08-10 ENCOUNTER — TELEPHONE (OUTPATIENT)
Dept: NEUROLOGY | Facility: HOSPITAL | Age: 71
End: 2021-08-10

## 2021-08-10 LAB
BACTERIA BLD CULT: ABNORMAL

## 2021-08-12 DIAGNOSIS — B37.9 YEAST INFECTION: Primary | ICD-10-CM

## 2021-08-12 DIAGNOSIS — C7B.8 SECONDARY NEUROENDOCRINE TUMOR OF LIVER: ICD-10-CM

## 2021-08-12 LAB — BACTERIA BLD CULT: NORMAL

## 2021-08-13 RX ORDER — FLUCONAZOLE 200 MG/1
200 TABLET ORAL DAILY
Qty: 5 TABLET | Refills: 0 | Status: SHIPPED | OUTPATIENT
Start: 2021-08-13 | End: 2021-08-18

## 2021-08-31 ENCOUNTER — EXTERNAL CHRONIC CARE MANAGEMENT (OUTPATIENT)
Dept: PRIMARY CARE CLINIC | Facility: CLINIC | Age: 71
End: 2021-08-31
Payer: MEDICARE

## 2021-08-31 PROCEDURE — 99439 PR CHRONIC CARE MGMT, EA ADDTL 20 MIN: ICD-10-PCS | Mod: S$GLB,,, | Performed by: INTERNAL MEDICINE

## 2021-08-31 PROCEDURE — 99490 CHRNC CARE MGMT STAFF 1ST 20: CPT | Mod: S$GLB,,, | Performed by: INTERNAL MEDICINE

## 2021-08-31 PROCEDURE — 99490 PR CHRONIC CARE MGMT, 1ST 20 MIN: ICD-10-PCS | Mod: S$GLB,,, | Performed by: INTERNAL MEDICINE

## 2021-08-31 PROCEDURE — 99439 CHRNC CARE MGMT STAF EA ADDL: CPT | Mod: S$GLB,,, | Performed by: INTERNAL MEDICINE

## 2021-09-15 DIAGNOSIS — N39.0 UTI (URINARY TRACT INFECTION): ICD-10-CM

## 2021-09-15 RX ORDER — NITROFURANTOIN 25; 75 MG/1; MG/1
100 CAPSULE ORAL 2 TIMES DAILY
Qty: 7 CAPSULE | Refills: 0 | Status: SHIPPED | OUTPATIENT
Start: 2021-09-15 | End: 2021-10-05 | Stop reason: ALTCHOICE

## 2021-09-30 ENCOUNTER — EXTERNAL CHRONIC CARE MANAGEMENT (OUTPATIENT)
Dept: PRIMARY CARE CLINIC | Facility: CLINIC | Age: 71
End: 2021-09-30
Payer: MEDICARE

## 2021-09-30 PROCEDURE — 99490 PR CHRONIC CARE MGMT, 1ST 20 MIN: ICD-10-PCS | Mod: S$GLB,,, | Performed by: INTERNAL MEDICINE

## 2021-09-30 PROCEDURE — 99439 PR CHRONIC CARE MGMT, EA ADDTL 20 MIN: ICD-10-PCS | Mod: S$GLB,,, | Performed by: INTERNAL MEDICINE

## 2021-09-30 PROCEDURE — 99490 CHRNC CARE MGMT STAFF 1ST 20: CPT | Mod: S$GLB,,, | Performed by: INTERNAL MEDICINE

## 2021-09-30 PROCEDURE — 99439 CHRNC CARE MGMT STAF EA ADDL: CPT | Mod: S$GLB,,, | Performed by: INTERNAL MEDICINE

## 2021-10-05 ENCOUNTER — HOSPITAL ENCOUNTER (OUTPATIENT)
Facility: HOSPITAL | Age: 71
Discharge: HOME HEALTH | End: 2021-10-07
Attending: EMERGENCY MEDICINE | Admitting: FAMILY MEDICINE
Payer: MEDICARE

## 2021-10-05 DIAGNOSIS — E34.0 CARCINOID SYNDROME: ICD-10-CM

## 2021-10-05 DIAGNOSIS — C7A.012 MALIGNANT CARCINOID TUMOR OF ILEUM: ICD-10-CM

## 2021-10-05 DIAGNOSIS — D63.8 ANEMIA OF CHRONIC DISEASE: Chronic | ICD-10-CM

## 2021-10-05 DIAGNOSIS — N39.0 URINARY TRACT INFECTION WITHOUT HEMATURIA, SITE UNSPECIFIED: ICD-10-CM

## 2021-10-05 DIAGNOSIS — C7B.09 CARCINOID TUMOR METASTATIC TO INTRA-ABDOMINAL LYMPH NODE: ICD-10-CM

## 2021-10-05 DIAGNOSIS — N39.0 UTI (URINARY TRACT INFECTION): Primary | ICD-10-CM

## 2021-10-05 DIAGNOSIS — C7B.02 SECONDARY MALIGNANT CARCINOID TUMOR OF LIVER: ICD-10-CM

## 2021-10-05 DIAGNOSIS — R50.9 FEVER, UNSPECIFIED FEVER CAUSE: ICD-10-CM

## 2021-10-05 DIAGNOSIS — C7A.00 CARCINOID TUMOR METASTATIC TO INTRA-ABDOMINAL LYMPH NODE: ICD-10-CM

## 2021-10-05 PROBLEM — Z20.822 SUSPECTED COVID-19 VIRUS INFECTION: Status: ACTIVE | Noted: 2021-10-05

## 2021-10-05 LAB
ALBUMIN SERPL BCP-MCNC: 3.3 G/DL (ref 3.5–5.2)
ALP SERPL-CCNC: 182 U/L (ref 55–135)
ALT SERPL W/O P-5'-P-CCNC: 30 U/L (ref 10–44)
ANION GAP SERPL CALC-SCNC: 12 MMOL/L (ref 8–16)
AST SERPL-CCNC: 55 U/L (ref 10–40)
BACTERIA #/AREA URNS HPF: ABNORMAL /HPF
BASOPHILS # BLD AUTO: 0.03 K/UL (ref 0–0.2)
BASOPHILS NFR BLD: 0.5 % (ref 0–1.9)
BILIRUB SERPL-MCNC: 0.6 MG/DL (ref 0.1–1)
BILIRUB UR QL STRIP: NEGATIVE
BUN SERPL-MCNC: 18 MG/DL (ref 8–23)
CALCIUM SERPL-MCNC: 9.2 MG/DL (ref 8.7–10.5)
CHLORIDE SERPL-SCNC: 107 MMOL/L (ref 95–110)
CK SERPL-CCNC: 47 U/L (ref 20–180)
CLARITY UR: ABNORMAL
CO2 SERPL-SCNC: 23 MMOL/L (ref 23–29)
COLOR UR: YELLOW
CREAT SERPL-MCNC: 1.1 MG/DL (ref 0.5–1.4)
CRP SERPL-MCNC: 24.7 MG/L (ref 0–8.2)
CTP QC/QA: YES
DIFFERENTIAL METHOD: ABNORMAL
EOSINOPHIL # BLD AUTO: 0 K/UL (ref 0–0.5)
EOSINOPHIL NFR BLD: 0.6 % (ref 0–8)
ERYTHROCYTE [DISTWIDTH] IN BLOOD BY AUTOMATED COUNT: 13.7 % (ref 11.5–14.5)
EST. GFR  (AFRICAN AMERICAN): 59 ML/MIN/1.73 M^2
EST. GFR  (NON AFRICAN AMERICAN): 51 ML/MIN/1.73 M^2
FERRITIN SERPL-MCNC: 67 NG/ML (ref 20–300)
GLUCOSE SERPL-MCNC: 126 MG/DL (ref 70–110)
GLUCOSE UR QL STRIP: ABNORMAL
HCT VFR BLD AUTO: 32.8 % (ref 37–48.5)
HGB BLD-MCNC: 10.1 G/DL (ref 12–16)
HGB UR QL STRIP: ABNORMAL
IMM GRANULOCYTES # BLD AUTO: 0.04 K/UL (ref 0–0.04)
IMM GRANULOCYTES NFR BLD AUTO: 0.6 % (ref 0–0.5)
KETONES UR QL STRIP: NEGATIVE
LACTATE SERPL-SCNC: 1.1 MMOL/L (ref 0.5–2.2)
LDH SERPL L TO P-CCNC: 194 U/L (ref 110–260)
LEUKOCYTE ESTERASE UR QL STRIP: ABNORMAL
LIPASE SERPL-CCNC: 18 U/L (ref 4–60)
LYMPHOCYTES # BLD AUTO: 0.6 K/UL (ref 1–4.8)
LYMPHOCYTES NFR BLD: 8.5 % (ref 18–48)
MCH RBC QN AUTO: 27 PG (ref 27–31)
MCHC RBC AUTO-ENTMCNC: 30.8 G/DL (ref 32–36)
MCV RBC AUTO: 88 FL (ref 82–98)
MICROSCOPIC COMMENT: ABNORMAL
MONOCYTES # BLD AUTO: 0.6 K/UL (ref 0.3–1)
MONOCYTES NFR BLD: 9.1 % (ref 4–15)
NEUTROPHILS # BLD AUTO: 5.3 K/UL (ref 1.8–7.7)
NEUTROPHILS NFR BLD: 80.7 % (ref 38–73)
NITRITE UR QL STRIP: POSITIVE
NRBC BLD-RTO: 0 /100 WBC
PH UR STRIP: 8 [PH] (ref 5–8)
PLATELET # BLD AUTO: 191 K/UL (ref 150–450)
PMV BLD AUTO: 11.1 FL (ref 9.2–12.9)
POTASSIUM SERPL-SCNC: 3.3 MMOL/L (ref 3.5–5.1)
PROT SERPL-MCNC: 7.6 G/DL (ref 6–8.4)
PROT UR QL STRIP: NEGATIVE
RBC # BLD AUTO: 3.74 M/UL (ref 4–5.4)
RBC #/AREA URNS HPF: 4 /HPF (ref 0–4)
SARS-COV-2 RDRP RESP QL NAA+PROBE: NEGATIVE
SODIUM SERPL-SCNC: 142 MMOL/L (ref 136–145)
SP GR UR STRIP: 1.01 (ref 1–1.03)
SQUAMOUS #/AREA URNS HPF: 4 /HPF
TROPONIN I SERPL DL<=0.01 NG/ML-MCNC: <0.006 NG/ML (ref 0–0.03)
URN SPEC COLLECT METH UR: ABNORMAL
UROBILINOGEN UR STRIP-ACNC: NEGATIVE EU/DL
WBC # BLD AUTO: 6.59 K/UL (ref 3.9–12.7)
WBC #/AREA URNS HPF: 88 /HPF (ref 0–5)

## 2021-10-05 PROCEDURE — 96376 TX/PRO/DX INJ SAME DRUG ADON: CPT | Mod: HCNC

## 2021-10-05 PROCEDURE — 25000003 PHARM REV CODE 250: Performed by: FAMILY MEDICINE

## 2021-10-05 PROCEDURE — 99285 EMERGENCY DEPT VISIT HI MDM: CPT | Mod: 25,HCNC

## 2021-10-05 PROCEDURE — 63600175 PHARM REV CODE 636 W HCPCS: Performed by: FAMILY MEDICINE

## 2021-10-05 PROCEDURE — U0002 COVID-19 LAB TEST NON-CDC: HCPCS | Performed by: EMERGENCY MEDICINE

## 2021-10-05 PROCEDURE — 96367 TX/PROPH/DG ADDL SEQ IV INF: CPT | Mod: HCNC

## 2021-10-05 PROCEDURE — 63600175 PHARM REV CODE 636 W HCPCS: Performed by: EMERGENCY MEDICINE

## 2021-10-05 PROCEDURE — 82550 ASSAY OF CK (CPK): CPT | Performed by: EMERGENCY MEDICINE

## 2021-10-05 PROCEDURE — 82728 ASSAY OF FERRITIN: CPT | Performed by: EMERGENCY MEDICINE

## 2021-10-05 PROCEDURE — 87077 CULTURE AEROBIC IDENTIFY: CPT | Performed by: EMERGENCY MEDICINE

## 2021-10-05 PROCEDURE — 81000 URINALYSIS NONAUTO W/SCOPE: CPT | Performed by: EMERGENCY MEDICINE

## 2021-10-05 PROCEDURE — 84484 ASSAY OF TROPONIN QUANT: CPT | Performed by: EMERGENCY MEDICINE

## 2021-10-05 PROCEDURE — G0378 HOSPITAL OBSERVATION PER HR: HCPCS

## 2021-10-05 PROCEDURE — 96374 THER/PROPH/DIAG INJ IV PUSH: CPT | Mod: 59,HCNC

## 2021-10-05 PROCEDURE — 93010 EKG 12-LEAD: ICD-10-PCS | Mod: ,,, | Performed by: INTERNAL MEDICINE

## 2021-10-05 PROCEDURE — 83690 ASSAY OF LIPASE: CPT | Performed by: EMERGENCY MEDICINE

## 2021-10-05 PROCEDURE — 83615 LACTATE (LD) (LDH) ENZYME: CPT | Performed by: EMERGENCY MEDICINE

## 2021-10-05 PROCEDURE — 96365 THER/PROPH/DIAG IV INF INIT: CPT | Mod: HCNC

## 2021-10-05 PROCEDURE — 87088 URINE BACTERIA CULTURE: CPT | Performed by: EMERGENCY MEDICINE

## 2021-10-05 PROCEDURE — 87040 BLOOD CULTURE FOR BACTERIA: CPT | Performed by: EMERGENCY MEDICINE

## 2021-10-05 PROCEDURE — 93005 ELECTROCARDIOGRAM TRACING: CPT

## 2021-10-05 PROCEDURE — 96372 THER/PROPH/DIAG INJ SC/IM: CPT | Mod: 59,HCNC

## 2021-10-05 PROCEDURE — 87186 SC STD MICRODIL/AGAR DIL: CPT | Performed by: EMERGENCY MEDICINE

## 2021-10-05 PROCEDURE — 96361 HYDRATE IV INFUSION ADD-ON: CPT | Mod: 59,HCNC

## 2021-10-05 PROCEDURE — 96375 TX/PRO/DX INJ NEW DRUG ADDON: CPT | Mod: HCNC

## 2021-10-05 PROCEDURE — 83605 ASSAY OF LACTIC ACID: CPT | Performed by: EMERGENCY MEDICINE

## 2021-10-05 PROCEDURE — 25000003 PHARM REV CODE 250: Performed by: EMERGENCY MEDICINE

## 2021-10-05 PROCEDURE — 86140 C-REACTIVE PROTEIN: CPT | Performed by: EMERGENCY MEDICINE

## 2021-10-05 PROCEDURE — 80053 COMPREHEN METABOLIC PANEL: CPT | Performed by: EMERGENCY MEDICINE

## 2021-10-05 PROCEDURE — 85025 COMPLETE CBC W/AUTO DIFF WBC: CPT | Performed by: EMERGENCY MEDICINE

## 2021-10-05 PROCEDURE — 87086 URINE CULTURE/COLONY COUNT: CPT | Performed by: EMERGENCY MEDICINE

## 2021-10-05 PROCEDURE — 93010 ELECTROCARDIOGRAM REPORT: CPT | Mod: ,,, | Performed by: INTERNAL MEDICINE

## 2021-10-05 RX ORDER — OXYBUTYNIN CHLORIDE 5 MG/1
5 TABLET ORAL EVERY 8 HOURS PRN
Status: DISCONTINUED | OUTPATIENT
Start: 2021-10-05 | End: 2021-10-06

## 2021-10-05 RX ORDER — OXYCODONE HYDROCHLORIDE 15 MG/1
15 TABLET ORAL EVERY 4 HOURS PRN
COMMUNITY
Start: 2021-09-09 | End: 2022-06-21

## 2021-10-05 RX ORDER — MORPHINE SULFATE 4 MG/ML
4 INJECTION, SOLUTION INTRAMUSCULAR; INTRAVENOUS
Status: COMPLETED | OUTPATIENT
Start: 2021-10-05 | End: 2021-10-05

## 2021-10-05 RX ORDER — BUSPIRONE HYDROCHLORIDE 15 MG/1
15 TABLET ORAL 3 TIMES DAILY
Status: ON HOLD | COMMUNITY
Start: 2021-08-05 | End: 2021-11-13

## 2021-10-05 RX ORDER — AMLODIPINE BESYLATE 5 MG/1
5 TABLET ORAL DAILY
Status: DISCONTINUED | OUTPATIENT
Start: 2021-10-05 | End: 2021-10-06

## 2021-10-05 RX ORDER — SODIUM CHLORIDE 0.9 % (FLUSH) 0.9 %
10 SYRINGE (ML) INJECTION
Status: DISCONTINUED | OUTPATIENT
Start: 2021-10-05 | End: 2021-10-07 | Stop reason: HOSPADM

## 2021-10-05 RX ORDER — OXYCODONE AND ACETAMINOPHEN 5; 325 MG/1; MG/1
1 TABLET ORAL EVERY 4 HOURS PRN
Status: DISCONTINUED | OUTPATIENT
Start: 2021-10-05 | End: 2021-10-05

## 2021-10-05 RX ORDER — LOSARTAN POTASSIUM 50 MG/1
100 TABLET ORAL DAILY
Status: DISCONTINUED | OUTPATIENT
Start: 2021-10-05 | End: 2021-10-07 | Stop reason: HOSPADM

## 2021-10-05 RX ORDER — POTASSIUM CHLORIDE 20 MEQ/1
20 TABLET, EXTENDED RELEASE ORAL DAILY
Status: DISCONTINUED | OUTPATIENT
Start: 2021-10-05 | End: 2021-10-07 | Stop reason: HOSPADM

## 2021-10-05 RX ORDER — OXYCODONE AND ACETAMINOPHEN 5; 325 MG/1; MG/1
1 TABLET ORAL EVERY 6 HOURS PRN
Status: DISCONTINUED | OUTPATIENT
Start: 2021-10-05 | End: 2021-10-05

## 2021-10-05 RX ORDER — FENTANYL CITRATE 50 UG/ML
50 INJECTION, SOLUTION INTRAMUSCULAR; INTRAVENOUS EVERY 30 MIN PRN
Status: DISCONTINUED | OUTPATIENT
Start: 2021-10-05 | End: 2021-10-05

## 2021-10-05 RX ORDER — OXYCODONE HYDROCHLORIDE 5 MG/1
15 TABLET ORAL EVERY 8 HOURS PRN
Status: DISCONTINUED | OUTPATIENT
Start: 2021-10-05 | End: 2021-10-06

## 2021-10-05 RX ORDER — ACETAMINOPHEN 500 MG
1000 TABLET ORAL
Status: COMPLETED | OUTPATIENT
Start: 2021-10-05 | End: 2021-10-05

## 2021-10-05 RX ORDER — ENOXAPARIN SODIUM 100 MG/ML
40 INJECTION SUBCUTANEOUS EVERY 24 HOURS
Status: DISCONTINUED | OUTPATIENT
Start: 2021-10-05 | End: 2021-10-07 | Stop reason: HOSPADM

## 2021-10-05 RX ORDER — ATORVASTATIN CALCIUM 40 MG/1
40 TABLET, FILM COATED ORAL NIGHTLY
Status: DISCONTINUED | OUTPATIENT
Start: 2021-10-05 | End: 2021-10-07 | Stop reason: HOSPADM

## 2021-10-05 RX ORDER — AZELASTINE 1 MG/ML
1 SPRAY, METERED NASAL 2 TIMES DAILY
Status: DISCONTINUED | OUTPATIENT
Start: 2021-10-05 | End: 2021-10-07 | Stop reason: HOSPADM

## 2021-10-05 RX ORDER — METOPROLOL TARTRATE 25 MG/1
25 TABLET, FILM COATED ORAL 2 TIMES DAILY
Status: DISCONTINUED | OUTPATIENT
Start: 2021-10-05 | End: 2021-10-07 | Stop reason: HOSPADM

## 2021-10-05 RX ORDER — SODIUM CHLORIDE, SODIUM LACTATE, POTASSIUM CHLORIDE, CALCIUM CHLORIDE 600; 310; 30; 20 MG/100ML; MG/100ML; MG/100ML; MG/100ML
INJECTION, SOLUTION INTRAVENOUS CONTINUOUS
Status: DISCONTINUED | OUTPATIENT
Start: 2021-10-05 | End: 2021-10-05

## 2021-10-05 RX ADMIN — AMLODIPINE BESYLATE 5 MG: 5 TABLET ORAL at 11:10

## 2021-10-05 RX ADMIN — OXYCODONE HYDROCHLORIDE 15 MG: 5 TABLET ORAL at 07:10

## 2021-10-05 RX ADMIN — FENTANYL CITRATE 50 MCG: 50 INJECTION INTRAMUSCULAR; INTRAVENOUS at 03:10

## 2021-10-05 RX ADMIN — SODIUM CHLORIDE, SODIUM LACTATE, POTASSIUM CHLORIDE, AND CALCIUM CHLORIDE: .6; .31; .03; .02 INJECTION, SOLUTION INTRAVENOUS at 02:10

## 2021-10-05 RX ADMIN — PIPERACILLIN AND TAZOBACTAM 4.5 G: 4; .5 INJECTION, POWDER, FOR SOLUTION INTRAVENOUS at 03:10

## 2021-10-05 RX ADMIN — POTASSIUM CHLORIDE 20 MEQ: 1500 TABLET, EXTENDED RELEASE ORAL at 11:10

## 2021-10-05 RX ADMIN — METOPROLOL TARTRATE 25 MG: 25 TABLET, FILM COATED ORAL at 11:10

## 2021-10-05 RX ADMIN — METOPROLOL TARTRATE 25 MG: 25 TABLET, FILM COATED ORAL at 08:10

## 2021-10-05 RX ADMIN — CEFTRIAXONE SODIUM 1 G: 1 INJECTION, POWDER, FOR SOLUTION INTRAMUSCULAR; INTRAVENOUS at 11:10

## 2021-10-05 RX ADMIN — MORPHINE SULFATE 4 MG: 4 INJECTION INTRAVENOUS at 08:10

## 2021-10-05 RX ADMIN — ENOXAPARIN SODIUM 40 MG: 40 INJECTION SUBCUTANEOUS at 05:10

## 2021-10-05 RX ADMIN — OXYCODONE HYDROCHLORIDE AND ACETAMINOPHEN 1 TABLET: 5; 325 TABLET ORAL at 03:10

## 2021-10-05 RX ADMIN — OXYCODONE HYDROCHLORIDE AND ACETAMINOPHEN 1 TABLET: 5; 325 TABLET ORAL at 11:10

## 2021-10-05 RX ADMIN — MORPHINE SULFATE 4 MG: 4 INJECTION INTRAVENOUS at 04:10

## 2021-10-05 RX ADMIN — LOSARTAN POTASSIUM 100 MG: 50 TABLET, FILM COATED ORAL at 11:10

## 2021-10-05 RX ADMIN — FENTANYL CITRATE 50 MCG: 50 INJECTION INTRAMUSCULAR; INTRAVENOUS at 02:10

## 2021-10-05 RX ADMIN — ACETAMINOPHEN 1000 MG: 500 TABLET ORAL at 03:10

## 2021-10-06 LAB
ANION GAP SERPL CALC-SCNC: 10 MMOL/L (ref 8–16)
BASOPHILS # BLD AUTO: 0.03 K/UL (ref 0–0.2)
BASOPHILS NFR BLD: 0.8 % (ref 0–1.9)
BUN SERPL-MCNC: 17 MG/DL (ref 8–23)
CALCIUM SERPL-MCNC: 9 MG/DL (ref 8.7–10.5)
CHLORIDE SERPL-SCNC: 109 MMOL/L (ref 95–110)
CO2 SERPL-SCNC: 22 MMOL/L (ref 23–29)
CREAT SERPL-MCNC: 1 MG/DL (ref 0.5–1.4)
DIFFERENTIAL METHOD: ABNORMAL
EOSINOPHIL # BLD AUTO: 0.1 K/UL (ref 0–0.5)
EOSINOPHIL NFR BLD: 3.1 % (ref 0–8)
ERYTHROCYTE [DISTWIDTH] IN BLOOD BY AUTOMATED COUNT: 14 % (ref 11.5–14.5)
EST. GFR  (AFRICAN AMERICAN): >60 ML/MIN/1.73 M^2
EST. GFR  (NON AFRICAN AMERICAN): 58 ML/MIN/1.73 M^2
GLUCOSE SERPL-MCNC: 101 MG/DL (ref 70–110)
HCT VFR BLD AUTO: 31.9 % (ref 37–48.5)
HGB BLD-MCNC: 9.4 G/DL (ref 12–16)
IMM GRANULOCYTES # BLD AUTO: 0 K/UL (ref 0–0.04)
IMM GRANULOCYTES NFR BLD AUTO: 0 % (ref 0–0.5)
LYMPHOCYTES # BLD AUTO: 1.1 K/UL (ref 1–4.8)
LYMPHOCYTES NFR BLD: 30.1 % (ref 18–48)
MCH RBC QN AUTO: 28 PG (ref 27–31)
MCHC RBC AUTO-ENTMCNC: 29.5 G/DL (ref 32–36)
MCV RBC AUTO: 95 FL (ref 82–98)
MONOCYTES # BLD AUTO: 0.5 K/UL (ref 0.3–1)
MONOCYTES NFR BLD: 12.6 % (ref 4–15)
NEUTROPHILS # BLD AUTO: 1.9 K/UL (ref 1.8–7.7)
NEUTROPHILS NFR BLD: 53.4 % (ref 38–73)
NRBC BLD-RTO: 0 /100 WBC
PLATELET # BLD AUTO: 141 K/UL (ref 150–450)
PLATELET BLD QL SMEAR: ABNORMAL
PMV BLD AUTO: 13.4 FL (ref 9.2–12.9)
POTASSIUM SERPL-SCNC: 4.1 MMOL/L (ref 3.5–5.1)
RBC # BLD AUTO: 3.36 M/UL (ref 4–5.4)
SODIUM SERPL-SCNC: 141 MMOL/L (ref 136–145)
WBC # BLD AUTO: 3.56 K/UL (ref 3.9–12.7)

## 2021-10-06 PROCEDURE — 97535 SELF CARE MNGMENT TRAINING: CPT

## 2021-10-06 PROCEDURE — G0378 HOSPITAL OBSERVATION PER HR: HCPCS

## 2021-10-06 PROCEDURE — 25000003 PHARM REV CODE 250: Performed by: FAMILY MEDICINE

## 2021-10-06 PROCEDURE — 36415 COLL VENOUS BLD VENIPUNCTURE: CPT | Performed by: FAMILY MEDICINE

## 2021-10-06 PROCEDURE — 97165 OT EVAL LOW COMPLEX 30 MIN: CPT

## 2021-10-06 PROCEDURE — 96376 TX/PRO/DX INJ SAME DRUG ADON: CPT | Performed by: EMERGENCY MEDICINE

## 2021-10-06 PROCEDURE — 80048 BASIC METABOLIC PNL TOTAL CA: CPT | Performed by: FAMILY MEDICINE

## 2021-10-06 PROCEDURE — 25000003 PHARM REV CODE 250: Performed by: STUDENT IN AN ORGANIZED HEALTH CARE EDUCATION/TRAINING PROGRAM

## 2021-10-06 PROCEDURE — 96372 THER/PROPH/DIAG INJ SC/IM: CPT | Mod: 59 | Performed by: EMERGENCY MEDICINE

## 2021-10-06 PROCEDURE — 97161 PT EVAL LOW COMPLEX 20 MIN: CPT

## 2021-10-06 PROCEDURE — 63600175 PHARM REV CODE 636 W HCPCS: Performed by: FAMILY MEDICINE

## 2021-10-06 PROCEDURE — 85025 COMPLETE CBC W/AUTO DIFF WBC: CPT | Performed by: FAMILY MEDICINE

## 2021-10-06 PROCEDURE — 97116 GAIT TRAINING THERAPY: CPT

## 2021-10-06 PROCEDURE — S0179 MEGESTROL 20 MG: HCPCS | Performed by: STUDENT IN AN ORGANIZED HEALTH CARE EDUCATION/TRAINING PROGRAM

## 2021-10-06 RX ORDER — DICLOFENAC SODIUM 10 MG/G
2 GEL TOPICAL 2 TIMES DAILY
Status: DISCONTINUED | OUTPATIENT
Start: 2021-10-06 | End: 2021-10-06

## 2021-10-06 RX ORDER — BUSPIRONE HYDROCHLORIDE 5 MG/1
15 TABLET ORAL 3 TIMES DAILY
Status: DISCONTINUED | OUTPATIENT
Start: 2021-10-06 | End: 2021-10-07 | Stop reason: HOSPADM

## 2021-10-06 RX ORDER — LANOLIN ALCOHOL/MO/W.PET/CERES
400 CREAM (GRAM) TOPICAL 2 TIMES DAILY
Status: DISCONTINUED | OUTPATIENT
Start: 2021-10-06 | End: 2021-10-07 | Stop reason: HOSPADM

## 2021-10-06 RX ORDER — AMLODIPINE BESYLATE 5 MG/1
10 TABLET ORAL DAILY
Status: DISCONTINUED | OUTPATIENT
Start: 2021-10-07 | End: 2021-10-07 | Stop reason: HOSPADM

## 2021-10-06 RX ORDER — DICLOFENAC SODIUM 10 MG/G
GEL TOPICAL DAILY
Status: CANCELLED | OUTPATIENT
Start: 2021-10-07

## 2021-10-06 RX ORDER — PHENAZOPYRIDINE HYDROCHLORIDE 100 MG/1
100 TABLET, FILM COATED ORAL
Status: DISCONTINUED | OUTPATIENT
Start: 2021-10-06 | End: 2021-10-07 | Stop reason: HOSPADM

## 2021-10-06 RX ORDER — OXYBUTYNIN CHLORIDE 5 MG/1
5 TABLET ORAL 3 TIMES DAILY
Status: DISCONTINUED | OUTPATIENT
Start: 2021-10-06 | End: 2021-10-07 | Stop reason: HOSPADM

## 2021-10-06 RX ORDER — HYDRALAZINE HYDROCHLORIDE 25 MG/1
25 TABLET, FILM COATED ORAL EVERY 8 HOURS
Status: DISCONTINUED | OUTPATIENT
Start: 2021-10-06 | End: 2021-10-07 | Stop reason: HOSPADM

## 2021-10-06 RX ORDER — MEGESTROL ACETATE 40 MG/ML
200 SUSPENSION ORAL DAILY
Status: DISCONTINUED | OUTPATIENT
Start: 2021-10-06 | End: 2021-10-07

## 2021-10-06 RX ORDER — LIDOCAINE 50 MG/G
1 PATCH TOPICAL
Status: DISCONTINUED | OUTPATIENT
Start: 2021-10-06 | End: 2021-10-07 | Stop reason: HOSPADM

## 2021-10-06 RX ORDER — OXYCODONE HYDROCHLORIDE 5 MG/1
15 TABLET ORAL EVERY 4 HOURS PRN
Status: DISCONTINUED | OUTPATIENT
Start: 2021-10-06 | End: 2021-10-07 | Stop reason: HOSPADM

## 2021-10-06 RX ADMIN — LIDOCAINE 1 PATCH: 50 PATCH CUTANEOUS at 08:10

## 2021-10-06 RX ADMIN — OXYCODONE 15 MG: 5 TABLET ORAL at 11:10

## 2021-10-06 RX ADMIN — PHENAZOPYRIDINE HYDROCHLORIDE 100 MG: 100 TABLET ORAL at 11:10

## 2021-10-06 RX ADMIN — AMLODIPINE BESYLATE 5 MG: 5 TABLET ORAL at 08:10

## 2021-10-06 RX ADMIN — HYDRALAZINE HYDROCHLORIDE 25 MG: 25 TABLET, FILM COATED ORAL at 03:10

## 2021-10-06 RX ADMIN — OXYBUTYNIN CHLORIDE 5 MG: 5 TABLET ORAL at 08:10

## 2021-10-06 RX ADMIN — BUSPIRONE HYDROCHLORIDE 15 MG: 5 TABLET ORAL at 04:10

## 2021-10-06 RX ADMIN — CEFTRIAXONE SODIUM 1 G: 1 INJECTION, POWDER, FOR SOLUTION INTRAMUSCULAR; INTRAVENOUS at 11:10

## 2021-10-06 RX ADMIN — HYDRALAZINE HYDROCHLORIDE 25 MG: 25 TABLET, FILM COATED ORAL at 10:10

## 2021-10-06 RX ADMIN — OXYBUTYNIN CHLORIDE 5 MG: 5 TABLET ORAL at 04:10

## 2021-10-06 RX ADMIN — LOSARTAN POTASSIUM 100 MG: 50 TABLET, FILM COATED ORAL at 08:10

## 2021-10-06 RX ADMIN — HYDRALAZINE HYDROCHLORIDE 25 MG: 25 TABLET, FILM COATED ORAL at 09:10

## 2021-10-06 RX ADMIN — MEGESTROL ACETATE 200 MG: 40 SUSPENSION ORAL at 10:10

## 2021-10-06 RX ADMIN — OXYCODONE 15 MG: 5 TABLET ORAL at 04:10

## 2021-10-06 RX ADMIN — Medication 400 MG: at 10:10

## 2021-10-06 RX ADMIN — ENOXAPARIN SODIUM 40 MG: 40 INJECTION SUBCUTANEOUS at 04:10

## 2021-10-06 RX ADMIN — OXYCODONE 15 MG: 5 TABLET ORAL at 08:10

## 2021-10-06 RX ADMIN — OXYCODONE HYDROCHLORIDE 15 MG: 5 TABLET ORAL at 03:10

## 2021-10-06 RX ADMIN — PHENAZOPYRIDINE HYDROCHLORIDE 100 MG: 100 TABLET ORAL at 04:10

## 2021-10-06 RX ADMIN — Medication 400 MG: at 08:10

## 2021-10-06 RX ADMIN — ATORVASTATIN CALCIUM 40 MG: 40 TABLET, FILM COATED ORAL at 09:10

## 2021-10-06 RX ADMIN — POTASSIUM CHLORIDE 20 MEQ: 1500 TABLET, EXTENDED RELEASE ORAL at 08:10

## 2021-10-06 RX ADMIN — METOPROLOL TARTRATE 25 MG: 25 TABLET, FILM COATED ORAL at 08:10

## 2021-10-07 VITALS
BODY MASS INDEX: 29.44 KG/M2 | RESPIRATION RATE: 18 BRPM | SYSTOLIC BLOOD PRESSURE: 128 MMHG | HEART RATE: 83 BPM | TEMPERATURE: 98 F | OXYGEN SATURATION: 100 % | WEIGHT: 183.19 LBS | HEIGHT: 66 IN | DIASTOLIC BLOOD PRESSURE: 57 MMHG

## 2021-10-07 LAB
ANION GAP SERPL CALC-SCNC: 8 MMOL/L (ref 8–16)
BACTERIA UR CULT: ABNORMAL
BASOPHILS # BLD AUTO: 0.03 K/UL (ref 0–0.2)
BASOPHILS NFR BLD: 0.5 % (ref 0–1.9)
BUN SERPL-MCNC: 19 MG/DL (ref 8–23)
CALCIUM SERPL-MCNC: 9 MG/DL (ref 8.7–10.5)
CHLORIDE SERPL-SCNC: 110 MMOL/L (ref 95–110)
CO2 SERPL-SCNC: 22 MMOL/L (ref 23–29)
CREAT SERPL-MCNC: 1.1 MG/DL (ref 0.5–1.4)
DIFFERENTIAL METHOD: ABNORMAL
EOSINOPHIL # BLD AUTO: 0.2 K/UL (ref 0–0.5)
EOSINOPHIL NFR BLD: 3.2 % (ref 0–8)
ERYTHROCYTE [DISTWIDTH] IN BLOOD BY AUTOMATED COUNT: 14.1 % (ref 11.5–14.5)
EST. GFR  (AFRICAN AMERICAN): 59 ML/MIN/1.73 M^2
EST. GFR  (NON AFRICAN AMERICAN): 51 ML/MIN/1.73 M^2
GLUCOSE SERPL-MCNC: 109 MG/DL (ref 70–110)
HCT VFR BLD AUTO: 32.2 % (ref 37–48.5)
HGB BLD-MCNC: 9.5 G/DL (ref 12–16)
IMM GRANULOCYTES # BLD AUTO: 0.02 K/UL (ref 0–0.04)
IMM GRANULOCYTES NFR BLD AUTO: 0.4 % (ref 0–0.5)
LYMPHOCYTES # BLD AUTO: 1.8 K/UL (ref 1–4.8)
LYMPHOCYTES NFR BLD: 32.4 % (ref 18–48)
MCH RBC QN AUTO: 26.3 PG (ref 27–31)
MCHC RBC AUTO-ENTMCNC: 29.5 G/DL (ref 32–36)
MCV RBC AUTO: 89 FL (ref 82–98)
MONOCYTES # BLD AUTO: 0.7 K/UL (ref 0.3–1)
MONOCYTES NFR BLD: 12.3 % (ref 4–15)
NEUTROPHILS # BLD AUTO: 2.8 K/UL (ref 1.8–7.7)
NEUTROPHILS NFR BLD: 51.2 % (ref 38–73)
NRBC BLD-RTO: 0 /100 WBC
PLATELET # BLD AUTO: 196 K/UL (ref 150–450)
PLATELET BLD QL SMEAR: ABNORMAL
PMV BLD AUTO: 13.4 FL (ref 9.2–12.9)
POTASSIUM SERPL-SCNC: 3.9 MMOL/L (ref 3.5–5.1)
RBC # BLD AUTO: 3.61 M/UL (ref 4–5.4)
SODIUM SERPL-SCNC: 140 MMOL/L (ref 136–145)
WBC # BLD AUTO: 5.55 K/UL (ref 3.9–12.7)

## 2021-10-07 PROCEDURE — 80048 BASIC METABOLIC PNL TOTAL CA: CPT | Performed by: FAMILY MEDICINE

## 2021-10-07 PROCEDURE — 96376 TX/PRO/DX INJ SAME DRUG ADON: CPT | Performed by: EMERGENCY MEDICINE

## 2021-10-07 PROCEDURE — 85025 COMPLETE CBC W/AUTO DIFF WBC: CPT | Performed by: FAMILY MEDICINE

## 2021-10-07 PROCEDURE — 36415 COLL VENOUS BLD VENIPUNCTURE: CPT | Performed by: FAMILY MEDICINE

## 2021-10-07 PROCEDURE — 25000003 PHARM REV CODE 250: Performed by: FAMILY MEDICINE

## 2021-10-07 PROCEDURE — G0378 HOSPITAL OBSERVATION PER HR: HCPCS

## 2021-10-07 PROCEDURE — 63600175 PHARM REV CODE 636 W HCPCS: Performed by: FAMILY MEDICINE

## 2021-10-07 RX ORDER — NITROFURANTOIN 25; 75 MG/1; MG/1
100 CAPSULE ORAL 2 TIMES DAILY
Qty: 14 CAPSULE | Refills: 0 | Status: SHIPPED | OUTPATIENT
Start: 2021-10-07 | End: 2021-10-17

## 2021-10-07 RX ORDER — MEGESTROL ACETATE 40 MG/ML
400 SUSPENSION ORAL DAILY
Status: DISCONTINUED | OUTPATIENT
Start: 2021-10-07 | End: 2021-10-07

## 2021-10-07 RX ORDER — CYPROHEPTADINE HYDROCHLORIDE 4 MG/1
4 TABLET ORAL 3 TIMES DAILY
Status: DISCONTINUED | OUTPATIENT
Start: 2021-10-07 | End: 2021-10-07 | Stop reason: HOSPADM

## 2021-10-07 RX ORDER — AMLODIPINE BESYLATE 10 MG/1
10 TABLET ORAL DAILY
Qty: 30 TABLET | Refills: 11 | Status: ON HOLD | OUTPATIENT
Start: 2021-10-07 | End: 2021-12-10 | Stop reason: HOSPADM

## 2021-10-07 RX ORDER — PHENAZOPYRIDINE HYDROCHLORIDE 100 MG/1
100 TABLET, FILM COATED ORAL
Qty: 12 TABLET | Refills: 0 | Status: SHIPPED | OUTPATIENT
Start: 2021-10-07 | End: 2021-10-17

## 2021-10-07 RX ADMIN — OXYCODONE 15 MG: 5 TABLET ORAL at 01:10

## 2021-10-07 RX ADMIN — PHENAZOPYRIDINE HYDROCHLORIDE 100 MG: 100 TABLET ORAL at 09:10

## 2021-10-07 RX ADMIN — OXYCODONE 15 MG: 5 TABLET ORAL at 05:10

## 2021-10-07 RX ADMIN — AMLODIPINE BESYLATE 10 MG: 5 TABLET ORAL at 09:10

## 2021-10-07 RX ADMIN — OXYBUTYNIN CHLORIDE 5 MG: 5 TABLET ORAL at 09:10

## 2021-10-07 RX ADMIN — HYDRALAZINE HYDROCHLORIDE 25 MG: 25 TABLET, FILM COATED ORAL at 05:10

## 2021-10-07 RX ADMIN — CEFTRIAXONE SODIUM 1 G: 1 INJECTION, POWDER, FOR SOLUTION INTRAMUSCULAR; INTRAVENOUS at 09:10

## 2021-10-07 RX ADMIN — METOPROLOL TARTRATE 25 MG: 25 TABLET, FILM COATED ORAL at 09:10

## 2021-10-07 RX ADMIN — OXYCODONE 15 MG: 5 TABLET ORAL at 09:10

## 2021-10-07 RX ADMIN — LOSARTAN POTASSIUM 100 MG: 50 TABLET, FILM COATED ORAL at 09:10

## 2021-10-07 RX ADMIN — Medication 400 MG: at 09:10

## 2021-10-07 RX ADMIN — POTASSIUM CHLORIDE 20 MEQ: 1500 TABLET, EXTENDED RELEASE ORAL at 09:10

## 2021-10-08 ENCOUNTER — NURSE TRIAGE (OUTPATIENT)
Dept: ADMINISTRATIVE | Facility: CLINIC | Age: 71
End: 2021-10-08

## 2021-10-09 PROCEDURE — G0180 MD CERTIFICATION HHA PATIENT: HCPCS | Mod: ,,, | Performed by: FAMILY MEDICINE

## 2021-10-09 PROCEDURE — G0180 PR HOME HEALTH MD CERTIFICATION: ICD-10-PCS | Mod: ,,, | Performed by: FAMILY MEDICINE

## 2021-10-10 LAB — BACTERIA BLD CULT: NORMAL

## 2021-10-11 ENCOUNTER — TELEPHONE (OUTPATIENT)
Dept: FAMILY MEDICINE | Facility: CLINIC | Age: 71
End: 2021-10-11

## 2021-10-13 LAB — BACTERIA BLD CULT: NORMAL

## 2021-10-14 ENCOUNTER — OFFICE VISIT (OUTPATIENT)
Dept: FAMILY MEDICINE | Facility: CLINIC | Age: 71
End: 2021-10-14
Payer: MEDICARE

## 2021-10-14 VITALS
OXYGEN SATURATION: 98 % | WEIGHT: 164.81 LBS | DIASTOLIC BLOOD PRESSURE: 70 MMHG | SYSTOLIC BLOOD PRESSURE: 120 MMHG | HEART RATE: 80 BPM | BODY MASS INDEX: 26.49 KG/M2 | HEIGHT: 66 IN | RESPIRATION RATE: 16 BRPM

## 2021-10-14 DIAGNOSIS — R26.9 GAIT DISTURBANCE: ICD-10-CM

## 2021-10-14 DIAGNOSIS — I10 PRIMARY HYPERTENSION: ICD-10-CM

## 2021-10-14 DIAGNOSIS — L30.9 DERMATITIS: ICD-10-CM

## 2021-10-14 DIAGNOSIS — N30.00 ACUTE CYSTITIS WITHOUT HEMATURIA: Primary | ICD-10-CM

## 2021-10-14 DIAGNOSIS — N39.0 RECURRENT UTI: ICD-10-CM

## 2021-10-14 PROCEDURE — 99499 RISK ADDL DX/OHS AUDIT: ICD-10-PCS | Mod: S$GLB,,, | Performed by: FAMILY MEDICINE

## 2021-10-14 PROCEDURE — 3074F PR MOST RECENT SYSTOLIC BLOOD PRESSURE < 130 MM HG: ICD-10-PCS | Mod: HCNC,CPTII,S$GLB, | Performed by: FAMILY MEDICINE

## 2021-10-14 PROCEDURE — 3008F PR BODY MASS INDEX (BMI) DOCUMENTED: ICD-10-PCS | Mod: HCNC,CPTII,S$GLB, | Performed by: FAMILY MEDICINE

## 2021-10-14 PROCEDURE — 4010F PR ACE/ARB THEARPY RXD/TAKEN: ICD-10-PCS | Mod: HCNC,CPTII,S$GLB, | Performed by: FAMILY MEDICINE

## 2021-10-14 PROCEDURE — 1100F PR PT FALLS ASSESS DOC 2+ FALLS/FALL W/INJURY/YR: ICD-10-PCS | Mod: HCNC,CPTII,S$GLB, | Performed by: FAMILY MEDICINE

## 2021-10-14 PROCEDURE — 3044F PR MOST RECENT HEMOGLOBIN A1C LEVEL <7.0%: ICD-10-PCS | Mod: HCNC,CPTII,S$GLB, | Performed by: FAMILY MEDICINE

## 2021-10-14 PROCEDURE — 3288F FALL RISK ASSESSMENT DOCD: CPT | Mod: HCNC,CPTII,S$GLB, | Performed by: FAMILY MEDICINE

## 2021-10-14 PROCEDURE — 1126F PR PAIN SEVERITY QUANTIFIED, NO PAIN PRESENT: ICD-10-PCS | Mod: HCNC,CPTII,S$GLB, | Performed by: FAMILY MEDICINE

## 2021-10-14 PROCEDURE — 1100F PTFALLS ASSESS-DOCD GE2>/YR: CPT | Mod: HCNC,CPTII,S$GLB, | Performed by: FAMILY MEDICINE

## 2021-10-14 PROCEDURE — 99999 PR PBB SHADOW E&M-EST. PATIENT-LVL V: CPT | Mod: PBBFAC,HCNC,, | Performed by: FAMILY MEDICINE

## 2021-10-14 PROCEDURE — 3008F BODY MASS INDEX DOCD: CPT | Mod: HCNC,CPTII,S$GLB, | Performed by: FAMILY MEDICINE

## 2021-10-14 PROCEDURE — 1126F AMNT PAIN NOTED NONE PRSNT: CPT | Mod: HCNC,CPTII,S$GLB, | Performed by: FAMILY MEDICINE

## 2021-10-14 PROCEDURE — 1159F MED LIST DOCD IN RCRD: CPT | Mod: HCNC,CPTII,S$GLB, | Performed by: FAMILY MEDICINE

## 2021-10-14 PROCEDURE — 3044F HG A1C LEVEL LT 7.0%: CPT | Mod: HCNC,CPTII,S$GLB, | Performed by: FAMILY MEDICINE

## 2021-10-14 PROCEDURE — 3288F PR FALLS RISK ASSESSMENT DOCUMENTED: ICD-10-PCS | Mod: HCNC,CPTII,S$GLB, | Performed by: FAMILY MEDICINE

## 2021-10-14 PROCEDURE — 99214 OFFICE O/P EST MOD 30 MIN: CPT | Mod: HCNC,S$GLB,, | Performed by: FAMILY MEDICINE

## 2021-10-14 PROCEDURE — 4010F ACE/ARB THERAPY RXD/TAKEN: CPT | Mod: HCNC,CPTII,S$GLB, | Performed by: FAMILY MEDICINE

## 2021-10-14 PROCEDURE — 99999 PR PBB SHADOW E&M-EST. PATIENT-LVL V: ICD-10-PCS | Mod: PBBFAC,HCNC,, | Performed by: FAMILY MEDICINE

## 2021-10-14 PROCEDURE — 3078F DIAST BP <80 MM HG: CPT | Mod: HCNC,CPTII,S$GLB, | Performed by: FAMILY MEDICINE

## 2021-10-14 PROCEDURE — 99499 UNLISTED E&M SERVICE: CPT | Mod: S$GLB,,, | Performed by: FAMILY MEDICINE

## 2021-10-14 PROCEDURE — 3078F PR MOST RECENT DIASTOLIC BLOOD PRESSURE < 80 MM HG: ICD-10-PCS | Mod: HCNC,CPTII,S$GLB, | Performed by: FAMILY MEDICINE

## 2021-10-14 PROCEDURE — 1159F PR MEDICATION LIST DOCUMENTED IN MEDICAL RECORD: ICD-10-PCS | Mod: HCNC,CPTII,S$GLB, | Performed by: FAMILY MEDICINE

## 2021-10-14 PROCEDURE — 3074F SYST BP LT 130 MM HG: CPT | Mod: HCNC,CPTII,S$GLB, | Performed by: FAMILY MEDICINE

## 2021-10-14 PROCEDURE — 99214 PR OFFICE/OUTPT VISIT, EST, LEVL IV, 30-39 MIN: ICD-10-PCS | Mod: HCNC,S$GLB,, | Performed by: FAMILY MEDICINE

## 2021-10-14 RX ORDER — NYSTATIN 100000 U/G
CREAM TOPICAL 2 TIMES DAILY
Qty: 30 G | Refills: 0 | Status: SHIPPED | OUTPATIENT
Start: 2021-10-14 | End: 2022-02-02

## 2021-10-15 ENCOUNTER — PES CALL (OUTPATIENT)
Dept: ADMINISTRATIVE | Facility: CLINIC | Age: 71
End: 2021-10-15

## 2021-10-18 ENCOUNTER — TELEPHONE (OUTPATIENT)
Dept: NEUROLOGY | Facility: HOSPITAL | Age: 71
End: 2021-10-18

## 2021-10-18 ENCOUNTER — HOSPITAL ENCOUNTER (EMERGENCY)
Facility: HOSPITAL | Age: 71
Discharge: HOME | End: 2021-10-18
Attending: EMERGENCY MEDICINE
Payer: MEDICARE

## 2021-10-18 VITALS
SYSTOLIC BLOOD PRESSURE: 183 MMHG | OXYGEN SATURATION: 100 % | TEMPERATURE: 98 F | HEIGHT: 66 IN | HEART RATE: 90 BPM | BODY MASS INDEX: 26.36 KG/M2 | WEIGHT: 164 LBS | DIASTOLIC BLOOD PRESSURE: 82 MMHG | RESPIRATION RATE: 20 BRPM

## 2021-10-18 DIAGNOSIS — I10 HYPERTENSION, UNSPECIFIED TYPE: ICD-10-CM

## 2021-10-18 DIAGNOSIS — G89.29 OTHER CHRONIC PAIN: Primary | ICD-10-CM

## 2021-10-18 PROCEDURE — 99283 EMERGENCY DEPT VISIT LOW MDM: CPT | Mod: HCNC

## 2021-10-18 PROCEDURE — 25000003 PHARM REV CODE 250: Mod: HCNC | Performed by: EMERGENCY MEDICINE

## 2021-10-18 RX ORDER — OXYCODONE HYDROCHLORIDE 5 MG/1
5 TABLET ORAL
Status: COMPLETED | OUTPATIENT
Start: 2021-10-18 | End: 2021-10-18

## 2021-10-18 RX ORDER — OXYCODONE HYDROCHLORIDE 5 MG/1
15 TABLET ORAL
Status: COMPLETED | OUTPATIENT
Start: 2021-10-18 | End: 2021-10-18

## 2021-10-18 RX ADMIN — OXYCODONE HYDROCHLORIDE 5 MG: 5 TABLET ORAL at 09:10

## 2021-10-18 RX ADMIN — OXYCODONE HYDROCHLORIDE 15 MG: 5 TABLET ORAL at 09:10

## 2021-10-19 ENCOUNTER — TELEPHONE (OUTPATIENT)
Dept: FAMILY MEDICINE | Facility: CLINIC | Age: 71
End: 2021-10-19

## 2021-10-19 ENCOUNTER — TELEPHONE (OUTPATIENT)
Dept: NEUROLOGY | Facility: HOSPITAL | Age: 71
End: 2021-10-19

## 2021-10-19 DIAGNOSIS — Z78.9 FREQUENT HOSPITAL ADMISSIONS: ICD-10-CM

## 2021-10-19 DIAGNOSIS — R26.9 GAIT DISTURBANCE: Primary | ICD-10-CM

## 2021-10-19 DIAGNOSIS — R29.898 MUSCULAR DECONDITIONING: ICD-10-CM

## 2021-10-19 DIAGNOSIS — F02.818 OTHER FRONTOTEMPORAL DEMENTIA WITH BEHAVIORAL DISTURBANCE: Primary | ICD-10-CM

## 2021-10-19 DIAGNOSIS — G31.09 OTHER FRONTOTEMPORAL DEMENTIA WITH BEHAVIORAL DISTURBANCE: Primary | ICD-10-CM

## 2021-10-22 ENCOUNTER — TELEPHONE (OUTPATIENT)
Dept: FAMILY MEDICINE | Facility: CLINIC | Age: 71
End: 2021-10-22
Payer: MEDICARE

## 2021-10-22 ENCOUNTER — EXTERNAL HOME HEALTH (OUTPATIENT)
Dept: HOME HEALTH SERVICES | Facility: HOSPITAL | Age: 71
End: 2021-10-22
Payer: MEDICARE

## 2021-10-25 ENCOUNTER — TELEPHONE (OUTPATIENT)
Dept: FAMILY MEDICINE | Facility: CLINIC | Age: 71
End: 2021-10-25
Payer: MEDICARE

## 2021-10-25 DIAGNOSIS — R26.9 GAIT DISTURBANCE: Primary | ICD-10-CM

## 2021-10-25 DIAGNOSIS — R29.898 MUSCULAR DECONDITIONING: ICD-10-CM

## 2021-10-26 ENCOUNTER — PES CALL (OUTPATIENT)
Dept: ADMINISTRATIVE | Facility: CLINIC | Age: 71
End: 2021-10-26
Payer: MEDICARE

## 2021-10-26 ENCOUNTER — OFFICE VISIT (OUTPATIENT)
Dept: FAMILY MEDICINE | Facility: CLINIC | Age: 71
End: 2021-10-26
Payer: MEDICARE

## 2021-10-26 VITALS — BODY MASS INDEX: 26.36 KG/M2 | HEIGHT: 66 IN | WEIGHT: 164 LBS

## 2021-10-26 DIAGNOSIS — R54 FRAILTY: Primary | ICD-10-CM

## 2021-10-26 DIAGNOSIS — G89.29 OTHER CHRONIC PAIN: ICD-10-CM

## 2021-10-26 DIAGNOSIS — R29.898 MUSCULAR DECONDITIONING: ICD-10-CM

## 2021-10-26 PROCEDURE — 3288F FALL RISK ASSESSMENT DOCD: CPT | Mod: HCNC,CPTII,S$GLB, | Performed by: FAMILY MEDICINE

## 2021-10-26 PROCEDURE — 99999 PR PBB SHADOW E&M-EST. PATIENT-LVL III: CPT | Mod: PBBFAC,HCNC,, | Performed by: FAMILY MEDICINE

## 2021-10-26 PROCEDURE — 99999 PR PBB SHADOW E&M-EST. PATIENT-LVL III: ICD-10-PCS | Mod: PBBFAC,HCNC,, | Performed by: FAMILY MEDICINE

## 2021-10-26 PROCEDURE — 99214 OFFICE O/P EST MOD 30 MIN: CPT | Mod: HCNC,S$GLB,, | Performed by: FAMILY MEDICINE

## 2021-10-26 PROCEDURE — 1159F PR MEDICATION LIST DOCUMENTED IN MEDICAL RECORD: ICD-10-PCS | Mod: HCNC,CPTII,S$GLB, | Performed by: FAMILY MEDICINE

## 2021-10-26 PROCEDURE — 1100F PR PT FALLS ASSESS DOC 2+ FALLS/FALL W/INJURY/YR: ICD-10-PCS | Mod: HCNC,CPTII,S$GLB, | Performed by: FAMILY MEDICINE

## 2021-10-26 PROCEDURE — 3044F HG A1C LEVEL LT 7.0%: CPT | Mod: HCNC,CPTII,S$GLB, | Performed by: FAMILY MEDICINE

## 2021-10-26 PROCEDURE — 3008F PR BODY MASS INDEX (BMI) DOCUMENTED: ICD-10-PCS | Mod: HCNC,CPTII,S$GLB, | Performed by: FAMILY MEDICINE

## 2021-10-26 PROCEDURE — 1100F PTFALLS ASSESS-DOCD GE2>/YR: CPT | Mod: HCNC,CPTII,S$GLB, | Performed by: FAMILY MEDICINE

## 2021-10-26 PROCEDURE — 3044F PR MOST RECENT HEMOGLOBIN A1C LEVEL <7.0%: ICD-10-PCS | Mod: HCNC,CPTII,S$GLB, | Performed by: FAMILY MEDICINE

## 2021-10-26 PROCEDURE — 4010F ACE/ARB THERAPY RXD/TAKEN: CPT | Mod: HCNC,CPTII,S$GLB, | Performed by: FAMILY MEDICINE

## 2021-10-26 PROCEDURE — 1160F PR REVIEW ALL MEDS BY PRESCRIBER/CLIN PHARMACIST DOCUMENTED: ICD-10-PCS | Mod: HCNC,CPTII,S$GLB, | Performed by: FAMILY MEDICINE

## 2021-10-26 PROCEDURE — 1160F RVW MEDS BY RX/DR IN RCRD: CPT | Mod: HCNC,CPTII,S$GLB, | Performed by: FAMILY MEDICINE

## 2021-10-26 PROCEDURE — 3288F PR FALLS RISK ASSESSMENT DOCUMENTED: ICD-10-PCS | Mod: HCNC,CPTII,S$GLB, | Performed by: FAMILY MEDICINE

## 2021-10-26 PROCEDURE — 4010F PR ACE/ARB THEARPY RXD/TAKEN: ICD-10-PCS | Mod: HCNC,CPTII,S$GLB, | Performed by: FAMILY MEDICINE

## 2021-10-26 PROCEDURE — 99499 RISK ADDL DX/OHS AUDIT: ICD-10-PCS | Mod: S$GLB,,, | Performed by: FAMILY MEDICINE

## 2021-10-26 PROCEDURE — 99499 UNLISTED E&M SERVICE: CPT | Mod: S$GLB,,, | Performed by: FAMILY MEDICINE

## 2021-10-26 PROCEDURE — 1159F MED LIST DOCD IN RCRD: CPT | Mod: HCNC,CPTII,S$GLB, | Performed by: FAMILY MEDICINE

## 2021-10-26 PROCEDURE — 3008F BODY MASS INDEX DOCD: CPT | Mod: HCNC,CPTII,S$GLB, | Performed by: FAMILY MEDICINE

## 2021-10-26 PROCEDURE — 99214 PR OFFICE/OUTPT VISIT, EST, LEVL IV, 30-39 MIN: ICD-10-PCS | Mod: HCNC,S$GLB,, | Performed by: FAMILY MEDICINE

## 2021-10-31 ENCOUNTER — EXTERNAL CHRONIC CARE MANAGEMENT (OUTPATIENT)
Dept: PRIMARY CARE CLINIC | Facility: CLINIC | Age: 71
End: 2021-10-31
Payer: MEDICARE

## 2021-10-31 PROCEDURE — 99487 CPLX CHRNC CARE 1ST 60 MIN: CPT | Mod: S$GLB,,, | Performed by: INTERNAL MEDICINE

## 2021-10-31 PROCEDURE — 99489 PR COMPLX CHRON CARE MGMT, EA ADDTL 30 MIN, PER MONTH: ICD-10-PCS | Mod: S$GLB,,, | Performed by: INTERNAL MEDICINE

## 2021-10-31 PROCEDURE — 99487 PR COMPLX CHRON CARE MGMT, 1ST HR, PER MONTH: ICD-10-PCS | Mod: S$GLB,,, | Performed by: INTERNAL MEDICINE

## 2021-10-31 PROCEDURE — 99489 CPLX CHRNC CARE EA ADDL 30: CPT | Mod: S$GLB,,, | Performed by: INTERNAL MEDICINE

## 2021-11-03 RX ORDER — ISOPROPYL ALCOHOL 70 ML/100ML
1 SWAB TOPICAL
Qty: 90 EACH | Refills: 0 | Status: ON HOLD | OUTPATIENT
Start: 2021-11-03 | End: 2022-05-18

## 2021-11-04 ENCOUNTER — HOSPITAL ENCOUNTER (INPATIENT)
Facility: HOSPITAL | Age: 71
LOS: 9 days | Discharge: NURSING, REHAB OR LONG-TERM CARE | DRG: 690 | End: 2021-11-13
Attending: EMERGENCY MEDICINE | Admitting: FAMILY MEDICINE
Payer: MEDICARE

## 2021-11-04 DIAGNOSIS — N39.0 ACUTE UTI (URINARY TRACT INFECTION): Primary | ICD-10-CM

## 2021-11-04 DIAGNOSIS — N39.0 UTI (URINARY TRACT INFECTION): ICD-10-CM

## 2021-11-04 DIAGNOSIS — N39.0 URINARY TRACT INFECTION DUE TO ESBL KLEBSIELLA: ICD-10-CM

## 2021-11-04 DIAGNOSIS — B96.89 URINARY TRACT INFECTION DUE TO ESBL KLEBSIELLA: ICD-10-CM

## 2021-11-04 LAB
ALBUMIN SERPL BCP-MCNC: 3.9 G/DL (ref 3.5–5.2)
ALP SERPL-CCNC: 155 U/L (ref 55–135)
ALT SERPL W/O P-5'-P-CCNC: 8 U/L (ref 10–44)
ANION GAP SERPL CALC-SCNC: 12 MMOL/L (ref 8–16)
AST SERPL-CCNC: 12 U/L (ref 10–40)
BASOPHILS # BLD AUTO: 0.01 K/UL (ref 0–0.2)
BASOPHILS NFR BLD: 0.2 % (ref 0–1.9)
BILIRUB SERPL-MCNC: 0.3 MG/DL (ref 0.1–1)
BILIRUB UR QL STRIP: NEGATIVE
BUN SERPL-MCNC: 15 MG/DL (ref 8–23)
CALCIUM SERPL-MCNC: 9.4 MG/DL (ref 8.7–10.5)
CHLORIDE SERPL-SCNC: 109 MMOL/L (ref 95–110)
CLARITY UR: CLEAR
CO2 SERPL-SCNC: 21 MMOL/L (ref 23–29)
COLOR UR: YELLOW
CREAT SERPL-MCNC: 1.1 MG/DL (ref 0.5–1.4)
CTP QC/QA: YES
DIFFERENTIAL METHOD: ABNORMAL
EOSINOPHIL # BLD AUTO: 0.1 K/UL (ref 0–0.5)
EOSINOPHIL NFR BLD: 2 % (ref 0–8)
ERYTHROCYTE [DISTWIDTH] IN BLOOD BY AUTOMATED COUNT: 14.4 % (ref 11.5–14.5)
EST. GFR  (AFRICAN AMERICAN): 59 ML/MIN/1.73 M^2
EST. GFR  (NON AFRICAN AMERICAN): 51 ML/MIN/1.73 M^2
GLUCOSE SERPL-MCNC: 110 MG/DL (ref 70–110)
GLUCOSE UR QL STRIP: ABNORMAL
HCT VFR BLD AUTO: 31.9 % (ref 37–48.5)
HGB BLD-MCNC: 10.1 G/DL (ref 12–16)
HGB UR QL STRIP: NEGATIVE
IMM GRANULOCYTES # BLD AUTO: 0.01 K/UL (ref 0–0.04)
IMM GRANULOCYTES NFR BLD AUTO: 0.2 % (ref 0–0.5)
KETONES UR QL STRIP: NEGATIVE
LACTATE SERPL-SCNC: 1.4 MMOL/L (ref 0.5–2.2)
LEUKOCYTE ESTERASE UR QL STRIP: ABNORMAL
LYMPHOCYTES # BLD AUTO: 1.4 K/UL (ref 1–4.8)
LYMPHOCYTES NFR BLD: 26.6 % (ref 18–48)
MCH RBC QN AUTO: 27.6 PG (ref 27–31)
MCHC RBC AUTO-ENTMCNC: 31.7 G/DL (ref 32–36)
MCV RBC AUTO: 87 FL (ref 82–98)
MICROSCOPIC COMMENT: NORMAL
MONOCYTES # BLD AUTO: 0.5 K/UL (ref 0.3–1)
MONOCYTES NFR BLD: 8.6 % (ref 4–15)
NEUTROPHILS # BLD AUTO: 3.4 K/UL (ref 1.8–7.7)
NEUTROPHILS NFR BLD: 62.4 % (ref 38–73)
NITRITE UR QL STRIP: NEGATIVE
NRBC BLD-RTO: 0 /100 WBC
PH UR STRIP: 6 [PH] (ref 5–8)
PLATELET # BLD AUTO: 204 K/UL (ref 150–450)
PMV BLD AUTO: 10.9 FL (ref 9.2–12.9)
POTASSIUM SERPL-SCNC: 3.4 MMOL/L (ref 3.5–5.1)
PROT SERPL-MCNC: 7.8 G/DL (ref 6–8.4)
PROT UR QL STRIP: NEGATIVE
RBC # BLD AUTO: 3.66 M/UL (ref 4–5.4)
SARS-COV-2 RDRP RESP QL NAA+PROBE: NEGATIVE
SODIUM SERPL-SCNC: 142 MMOL/L (ref 136–145)
SP GR UR STRIP: 1.01 (ref 1–1.03)
URN SPEC COLLECT METH UR: ABNORMAL
UROBILINOGEN UR STRIP-ACNC: NEGATIVE EU/DL
WBC # BLD AUTO: 5.38 K/UL (ref 3.9–12.7)
WBC #/AREA URNS HPF: 3 /HPF (ref 0–5)

## 2021-11-04 PROCEDURE — 85025 COMPLETE CBC W/AUTO DIFF WBC: CPT | Mod: HCNC | Performed by: EMERGENCY MEDICINE

## 2021-11-04 PROCEDURE — U0002 COVID-19 LAB TEST NON-CDC: HCPCS | Mod: HCNC | Performed by: EMERGENCY MEDICINE

## 2021-11-04 PROCEDURE — 80053 COMPREHEN METABOLIC PANEL: CPT | Mod: HCNC | Performed by: EMERGENCY MEDICINE

## 2021-11-04 PROCEDURE — 25000003 PHARM REV CODE 250: Mod: HCNC | Performed by: EMERGENCY MEDICINE

## 2021-11-04 PROCEDURE — 83605 ASSAY OF LACTIC ACID: CPT | Mod: HCNC | Performed by: EMERGENCY MEDICINE

## 2021-11-04 PROCEDURE — 63600175 PHARM REV CODE 636 W HCPCS: Mod: HCNC | Performed by: EMERGENCY MEDICINE

## 2021-11-04 PROCEDURE — 87040 BLOOD CULTURE FOR BACTERIA: CPT | Mod: 59,HCNC | Performed by: EMERGENCY MEDICINE

## 2021-11-04 PROCEDURE — 11000001 HC ACUTE MED/SURG PRIVATE ROOM: Mod: HCNC

## 2021-11-04 PROCEDURE — 81000 URINALYSIS NONAUTO W/SCOPE: CPT | Mod: HCNC | Performed by: EMERGENCY MEDICINE

## 2021-11-04 RX ORDER — ONDANSETRON 2 MG/ML
4 INJECTION INTRAMUSCULAR; INTRAVENOUS
Status: COMPLETED | OUTPATIENT
Start: 2021-11-04 | End: 2021-11-04

## 2021-11-04 RX ORDER — TALC
6 POWDER (GRAM) TOPICAL NIGHTLY PRN
Status: DISCONTINUED | OUTPATIENT
Start: 2021-11-04 | End: 2021-11-13 | Stop reason: HOSPADM

## 2021-11-04 RX ORDER — SODIUM CHLORIDE 0.9 % (FLUSH) 0.9 %
10 SYRINGE (ML) INJECTION
Status: DISCONTINUED | OUTPATIENT
Start: 2021-11-04 | End: 2021-11-13 | Stop reason: HOSPADM

## 2021-11-04 RX ORDER — ONDANSETRON 2 MG/ML
4 INJECTION INTRAMUSCULAR; INTRAVENOUS EVERY 8 HOURS PRN
Status: DISCONTINUED | OUTPATIENT
Start: 2021-11-04 | End: 2021-11-13 | Stop reason: HOSPADM

## 2021-11-04 RX ORDER — MORPHINE SULFATE 4 MG/ML
4 INJECTION, SOLUTION INTRAMUSCULAR; INTRAVENOUS
Status: COMPLETED | OUTPATIENT
Start: 2021-11-04 | End: 2021-11-04

## 2021-11-04 RX ORDER — ACETAMINOPHEN 325 MG/1
650 TABLET ORAL EVERY 4 HOURS PRN
Status: DISCONTINUED | OUTPATIENT
Start: 2021-11-04 | End: 2021-11-13 | Stop reason: HOSPADM

## 2021-11-04 RX ADMIN — ONDANSETRON 4 MG: 2 INJECTION INTRAMUSCULAR; INTRAVENOUS at 08:11

## 2021-11-04 RX ADMIN — SODIUM CHLORIDE 1000 ML: 0.9 INJECTION, SOLUTION INTRAVENOUS at 07:11

## 2021-11-04 RX ADMIN — PIPERACILLIN AND TAZOBACTAM 4.5 G: 4; .5 INJECTION, POWDER, FOR SOLUTION INTRAVENOUS at 08:11

## 2021-11-04 RX ADMIN — MORPHINE SULFATE 4 MG: 4 INJECTION INTRAVENOUS at 08:11

## 2021-11-05 LAB
BASOPHILS # BLD AUTO: 0.02 K/UL (ref 0–0.2)
BASOPHILS NFR BLD: 0.4 % (ref 0–1.9)
DIFFERENTIAL METHOD: ABNORMAL
EOSINOPHIL # BLD AUTO: 0.1 K/UL (ref 0–0.5)
EOSINOPHIL NFR BLD: 1.8 % (ref 0–8)
ERYTHROCYTE [DISTWIDTH] IN BLOOD BY AUTOMATED COUNT: 14.6 % (ref 11.5–14.5)
HCT VFR BLD AUTO: 28 % (ref 37–48.5)
HGB BLD-MCNC: 8.8 G/DL (ref 12–16)
IMM GRANULOCYTES # BLD AUTO: 0.01 K/UL (ref 0–0.04)
IMM GRANULOCYTES NFR BLD AUTO: 0.2 % (ref 0–0.5)
LYMPHOCYTES # BLD AUTO: 1 K/UL (ref 1–4.8)
LYMPHOCYTES NFR BLD: 20.9 % (ref 18–48)
MCH RBC QN AUTO: 27.3 PG (ref 27–31)
MCHC RBC AUTO-ENTMCNC: 31.4 G/DL (ref 32–36)
MCV RBC AUTO: 87 FL (ref 82–98)
MONOCYTES # BLD AUTO: 0.5 K/UL (ref 0.3–1)
MONOCYTES NFR BLD: 10.8 % (ref 4–15)
NEUTROPHILS # BLD AUTO: 3.2 K/UL (ref 1.8–7.7)
NEUTROPHILS NFR BLD: 65.9 % (ref 38–73)
NRBC BLD-RTO: 0 /100 WBC
PLATELET # BLD AUTO: 183 K/UL (ref 150–450)
PMV BLD AUTO: 10.8 FL (ref 9.2–12.9)
RBC # BLD AUTO: 3.22 M/UL (ref 4–5.4)
T4 FREE SERPL-MCNC: 1.04 NG/DL (ref 0.71–1.51)
TSH SERPL DL<=0.005 MIU/L-ACNC: 0.31 UIU/ML (ref 0.4–4)
WBC # BLD AUTO: 4.89 K/UL (ref 3.9–12.7)

## 2021-11-05 PROCEDURE — 63600175 PHARM REV CODE 636 W HCPCS: Mod: HCNC | Performed by: FAMILY MEDICINE

## 2021-11-05 PROCEDURE — 11000001 HC ACUTE MED/SURG PRIVATE ROOM: Mod: HCNC

## 2021-11-05 PROCEDURE — 36415 COLL VENOUS BLD VENIPUNCTURE: CPT | Mod: HCNC | Performed by: FAMILY MEDICINE

## 2021-11-05 PROCEDURE — 63600175 PHARM REV CODE 636 W HCPCS: Mod: HCNC | Performed by: NURSE PRACTITIONER

## 2021-11-05 PROCEDURE — 84439 ASSAY OF FREE THYROXINE: CPT | Mod: HCNC | Performed by: FAMILY MEDICINE

## 2021-11-05 PROCEDURE — 25000003 PHARM REV CODE 250: Mod: HCNC | Performed by: NURSE PRACTITIONER

## 2021-11-05 PROCEDURE — 97166 OT EVAL MOD COMPLEX 45 MIN: CPT | Mod: HCNC

## 2021-11-05 PROCEDURE — 36415 COLL VENOUS BLD VENIPUNCTURE: CPT | Mod: HCNC | Performed by: NURSE PRACTITIONER

## 2021-11-05 PROCEDURE — 25000003 PHARM REV CODE 250: Mod: HCNC | Performed by: FAMILY MEDICINE

## 2021-11-05 PROCEDURE — 84443 ASSAY THYROID STIM HORMONE: CPT | Mod: HCNC | Performed by: FAMILY MEDICINE

## 2021-11-05 PROCEDURE — 85025 COMPLETE CBC W/AUTO DIFF WBC: CPT | Mod: HCNC | Performed by: NURSE PRACTITIONER

## 2021-11-05 PROCEDURE — 27000207 HC ISOLATION: Mod: HCNC

## 2021-11-05 PROCEDURE — 97535 SELF CARE MNGMENT TRAINING: CPT | Mod: HCNC

## 2021-11-05 RX ORDER — HYDRALAZINE HYDROCHLORIDE 20 MG/ML
10 INJECTION INTRAMUSCULAR; INTRAVENOUS EVERY 6 HOURS PRN
Status: DISCONTINUED | OUTPATIENT
Start: 2021-11-05 | End: 2021-11-13 | Stop reason: HOSPADM

## 2021-11-05 RX ORDER — OXYCODONE HYDROCHLORIDE 5 MG/1
15 TABLET ORAL 4 TIMES DAILY
Status: DISCONTINUED | OUTPATIENT
Start: 2021-11-05 | End: 2021-11-05

## 2021-11-05 RX ORDER — ATORVASTATIN CALCIUM 40 MG/1
40 TABLET, FILM COATED ORAL NIGHTLY
Status: DISCONTINUED | OUTPATIENT
Start: 2021-11-05 | End: 2021-11-13 | Stop reason: HOSPADM

## 2021-11-05 RX ORDER — OXYCODONE HYDROCHLORIDE 5 MG/1
15 TABLET ORAL 4 TIMES DAILY
Status: DISCONTINUED | OUTPATIENT
Start: 2021-11-05 | End: 2021-11-06

## 2021-11-05 RX ORDER — LOSARTAN POTASSIUM 50 MG/1
100 TABLET ORAL DAILY
Status: DISCONTINUED | OUTPATIENT
Start: 2021-11-05 | End: 2021-11-06

## 2021-11-05 RX ORDER — OXYCODONE HYDROCHLORIDE 5 MG/1
15 TABLET ORAL ONCE
Status: COMPLETED | OUTPATIENT
Start: 2021-11-05 | End: 2021-11-05

## 2021-11-05 RX ORDER — POTASSIUM CHLORIDE 20 MEQ/1
20 TABLET, EXTENDED RELEASE ORAL DAILY
Status: DISCONTINUED | OUTPATIENT
Start: 2021-11-05 | End: 2021-11-13 | Stop reason: HOSPADM

## 2021-11-05 RX ORDER — AMLODIPINE BESYLATE 5 MG/1
10 TABLET ORAL DAILY
Status: DISCONTINUED | OUTPATIENT
Start: 2021-11-05 | End: 2021-11-06

## 2021-11-05 RX ORDER — OXYCODONE HYDROCHLORIDE 5 MG/1
TABLET ORAL
Status: COMPLETED
Start: 2021-11-05 | End: 2021-11-07

## 2021-11-05 RX ORDER — LANOLIN ALCOHOL/MO/W.PET/CERES
400 CREAM (GRAM) TOPICAL 2 TIMES DAILY
Status: DISCONTINUED | OUTPATIENT
Start: 2021-11-05 | End: 2021-11-13 | Stop reason: HOSPADM

## 2021-11-05 RX ORDER — ENOXAPARIN SODIUM 100 MG/ML
40 INJECTION SUBCUTANEOUS EVERY 24 HOURS
Status: DISCONTINUED | OUTPATIENT
Start: 2021-11-05 | End: 2021-11-13 | Stop reason: HOSPADM

## 2021-11-05 RX ORDER — HYDRALAZINE HYDROCHLORIDE 20 MG/ML
INJECTION INTRAMUSCULAR; INTRAVENOUS
Status: DISPENSED
Start: 2021-11-05 | End: 2021-11-05

## 2021-11-05 RX ORDER — BUSPIRONE HYDROCHLORIDE 5 MG/1
15 TABLET ORAL 3 TIMES DAILY
Status: DISCONTINUED | OUTPATIENT
Start: 2021-11-05 | End: 2021-11-13 | Stop reason: HOSPADM

## 2021-11-05 RX ORDER — OXYBUTYNIN CHLORIDE 5 MG/1
5 TABLET ORAL EVERY 8 HOURS PRN
Status: DISCONTINUED | OUTPATIENT
Start: 2021-11-05 | End: 2021-11-13 | Stop reason: HOSPADM

## 2021-11-05 RX ORDER — METOPROLOL TARTRATE 25 MG/1
25 TABLET, FILM COATED ORAL 2 TIMES DAILY
Status: DISCONTINUED | OUTPATIENT
Start: 2021-11-05 | End: 2021-11-06

## 2021-11-05 RX ADMIN — POTASSIUM CHLORIDE 20 MEQ: 1500 TABLET, EXTENDED RELEASE ORAL at 10:11

## 2021-11-05 RX ADMIN — OXYCODONE 15 MG: 5 TABLET ORAL at 05:11

## 2021-11-05 RX ADMIN — ERTAPENEM 1 G: 1 INJECTION INTRAMUSCULAR; INTRAVENOUS at 09:11

## 2021-11-05 RX ADMIN — AMLODIPINE BESYLATE 10 MG: 5 TABLET ORAL at 10:11

## 2021-11-05 RX ADMIN — ATORVASTATIN CALCIUM 40 MG: 40 TABLET, FILM COATED ORAL at 09:11

## 2021-11-05 RX ADMIN — METOPROLOL TARTRATE 25 MG: 25 TABLET, FILM COATED ORAL at 10:11

## 2021-11-05 RX ADMIN — METOPROLOL TARTRATE 25 MG: 25 TABLET, FILM COATED ORAL at 09:11

## 2021-11-05 RX ADMIN — HYDRALAZINE HYDROCHLORIDE 10 MG: 20 INJECTION, SOLUTION INTRAMUSCULAR; INTRAVENOUS at 01:11

## 2021-11-05 RX ADMIN — OXYCODONE 15 MG: 5 TABLET ORAL at 11:11

## 2021-11-05 RX ADMIN — OXYCODONE 15 MG: 5 TABLET ORAL at 09:11

## 2021-11-05 RX ADMIN — ENOXAPARIN SODIUM 40 MG: 40 INJECTION, SOLUTION INTRAVENOUS; SUBCUTANEOUS at 05:11

## 2021-11-05 RX ADMIN — Medication 400 MG: at 10:11

## 2021-11-05 RX ADMIN — OXYCODONE 15 MG: 5 TABLET ORAL at 01:11

## 2021-11-05 RX ADMIN — LOSARTAN POTASSIUM 100 MG: 50 TABLET, FILM COATED ORAL at 10:11

## 2021-11-05 RX ADMIN — Medication 400 MG: at 09:11

## 2021-11-06 LAB
ANION GAP SERPL CALC-SCNC: 6 MMOL/L (ref 8–16)
BASOPHILS # BLD AUTO: 0.02 K/UL (ref 0–0.2)
BASOPHILS NFR BLD: 0.5 % (ref 0–1.9)
BUN SERPL-MCNC: 14 MG/DL (ref 8–23)
CALCIUM SERPL-MCNC: 9 MG/DL (ref 8.7–10.5)
CHLORIDE SERPL-SCNC: 112 MMOL/L (ref 95–110)
CO2 SERPL-SCNC: 25 MMOL/L (ref 23–29)
CREAT SERPL-MCNC: 0.9 MG/DL (ref 0.5–1.4)
DIFFERENTIAL METHOD: ABNORMAL
EOSINOPHIL # BLD AUTO: 0.1 K/UL (ref 0–0.5)
EOSINOPHIL NFR BLD: 3 % (ref 0–8)
ERYTHROCYTE [DISTWIDTH] IN BLOOD BY AUTOMATED COUNT: 15 % (ref 11.5–14.5)
EST. GFR  (AFRICAN AMERICAN): >60 ML/MIN/1.73 M^2
EST. GFR  (NON AFRICAN AMERICAN): >60 ML/MIN/1.73 M^2
GLUCOSE SERPL-MCNC: 117 MG/DL (ref 70–110)
HCT VFR BLD AUTO: 30.2 % (ref 37–48.5)
HGB BLD-MCNC: 9.4 G/DL (ref 12–16)
IMM GRANULOCYTES # BLD AUTO: 0.01 K/UL (ref 0–0.04)
IMM GRANULOCYTES NFR BLD AUTO: 0.3 % (ref 0–0.5)
LYMPHOCYTES # BLD AUTO: 1 K/UL (ref 1–4.8)
LYMPHOCYTES NFR BLD: 24.8 % (ref 18–48)
MCH RBC QN AUTO: 27.3 PG (ref 27–31)
MCHC RBC AUTO-ENTMCNC: 31.1 G/DL (ref 32–36)
MCV RBC AUTO: 88 FL (ref 82–98)
MONOCYTES # BLD AUTO: 0.4 K/UL (ref 0.3–1)
MONOCYTES NFR BLD: 9.3 % (ref 4–15)
NEUTROPHILS # BLD AUTO: 2.5 K/UL (ref 1.8–7.7)
NEUTROPHILS NFR BLD: 62.1 % (ref 38–73)
NRBC BLD-RTO: 0 /100 WBC
PLATELET # BLD AUTO: 202 K/UL (ref 150–450)
PMV BLD AUTO: 10.3 FL (ref 9.2–12.9)
POTASSIUM SERPL-SCNC: 3.7 MMOL/L (ref 3.5–5.1)
RBC # BLD AUTO: 3.44 M/UL (ref 4–5.4)
SODIUM SERPL-SCNC: 143 MMOL/L (ref 136–145)
WBC # BLD AUTO: 4 K/UL (ref 3.9–12.7)

## 2021-11-06 PROCEDURE — 36415 COLL VENOUS BLD VENIPUNCTURE: CPT | Mod: HCNC | Performed by: FAMILY MEDICINE

## 2021-11-06 PROCEDURE — 63600175 PHARM REV CODE 636 W HCPCS: Mod: HCNC | Performed by: FAMILY MEDICINE

## 2021-11-06 PROCEDURE — 25000003 PHARM REV CODE 250: Mod: HCNC | Performed by: FAMILY MEDICINE

## 2021-11-06 PROCEDURE — 85025 COMPLETE CBC W/AUTO DIFF WBC: CPT | Mod: HCNC | Performed by: FAMILY MEDICINE

## 2021-11-06 PROCEDURE — 97110 THERAPEUTIC EXERCISES: CPT | Mod: HCNC

## 2021-11-06 PROCEDURE — 11000001 HC ACUTE MED/SURG PRIVATE ROOM: Mod: HCNC

## 2021-11-06 PROCEDURE — 27000207 HC ISOLATION: Mod: HCNC

## 2021-11-06 PROCEDURE — 97162 PT EVAL MOD COMPLEX 30 MIN: CPT | Mod: HCNC

## 2021-11-06 PROCEDURE — 80048 BASIC METABOLIC PNL TOTAL CA: CPT | Mod: HCNC | Performed by: FAMILY MEDICINE

## 2021-11-06 RX ORDER — PHENAZOPYRIDINE HYDROCHLORIDE 100 MG/1
100 TABLET, FILM COATED ORAL
Status: COMPLETED | OUTPATIENT
Start: 2021-11-06 | End: 2021-11-07

## 2021-11-06 RX ORDER — AMLODIPINE BESYLATE 5 MG/1
10 TABLET ORAL DAILY
Status: DISCONTINUED | OUTPATIENT
Start: 2021-11-06 | End: 2021-11-13 | Stop reason: HOSPADM

## 2021-11-06 RX ORDER — LOSARTAN POTASSIUM 50 MG/1
100 TABLET ORAL DAILY
Status: DISCONTINUED | OUTPATIENT
Start: 2021-11-06 | End: 2021-11-13 | Stop reason: HOSPADM

## 2021-11-06 RX ORDER — METOPROLOL TARTRATE 25 MG/1
25 TABLET, FILM COATED ORAL 2 TIMES DAILY
Status: DISCONTINUED | OUTPATIENT
Start: 2021-11-06 | End: 2021-11-13 | Stop reason: HOSPADM

## 2021-11-06 RX ORDER — OXYCODONE HYDROCHLORIDE 5 MG/1
15 TABLET ORAL EVERY 6 HOURS PRN
Status: DISCONTINUED | OUTPATIENT
Start: 2021-11-06 | End: 2021-11-13 | Stop reason: HOSPADM

## 2021-11-06 RX ADMIN — LOSARTAN POTASSIUM 100 MG: 50 TABLET, FILM COATED ORAL at 04:11

## 2021-11-06 RX ADMIN — METOPROLOL TARTRATE 25 MG: 25 TABLET, FILM COATED ORAL at 09:11

## 2021-11-06 RX ADMIN — METOPROLOL TARTRATE 25 MG: 25 TABLET, FILM COATED ORAL at 04:11

## 2021-11-06 RX ADMIN — Medication 400 MG: at 09:11

## 2021-11-06 RX ADMIN — ENOXAPARIN SODIUM 40 MG: 40 INJECTION, SOLUTION INTRAVENOUS; SUBCUTANEOUS at 04:11

## 2021-11-06 RX ADMIN — METOPROLOL TARTRATE 25 MG: 25 TABLET, FILM COATED ORAL at 08:11

## 2021-11-06 RX ADMIN — OXYCODONE 15 MG: 5 TABLET ORAL at 03:11

## 2021-11-06 RX ADMIN — ERTAPENEM 1 G: 1 INJECTION INTRAMUSCULAR; INTRAVENOUS at 09:11

## 2021-11-06 RX ADMIN — PHENAZOPYRIDINE HYDROCHLORIDE 100 MG: 100 TABLET ORAL at 12:11

## 2021-11-06 RX ADMIN — ATORVASTATIN CALCIUM 40 MG: 40 TABLET, FILM COATED ORAL at 08:11

## 2021-11-06 RX ADMIN — AMLODIPINE BESYLATE 10 MG: 5 TABLET ORAL at 09:11

## 2021-11-06 RX ADMIN — AMLODIPINE BESYLATE 10 MG: 5 TABLET ORAL at 04:11

## 2021-11-06 RX ADMIN — POTASSIUM CHLORIDE 20 MEQ: 1500 TABLET, EXTENDED RELEASE ORAL at 09:11

## 2021-11-06 RX ADMIN — Medication 400 MG: at 08:11

## 2021-11-06 RX ADMIN — OXYCODONE 15 MG: 5 TABLET ORAL at 08:11

## 2021-11-06 RX ADMIN — ACETAMINOPHEN 650 MG: 325 TABLET, FILM COATED ORAL at 04:11

## 2021-11-06 RX ADMIN — OXYCODONE 15 MG: 5 TABLET ORAL at 09:11

## 2021-11-06 RX ADMIN — BUSPIRONE HYDROCHLORIDE 15 MG: 5 TABLET ORAL at 09:11

## 2021-11-06 RX ADMIN — LOSARTAN POTASSIUM 100 MG: 50 TABLET, FILM COATED ORAL at 09:11

## 2021-11-06 RX ADMIN — PHENAZOPYRIDINE HYDROCHLORIDE 100 MG: 100 TABLET ORAL at 06:11

## 2021-11-07 LAB
ANION GAP SERPL CALC-SCNC: 9 MMOL/L (ref 8–16)
BASOPHILS # BLD AUTO: 0.04 K/UL (ref 0–0.2)
BASOPHILS NFR BLD: 0.9 % (ref 0–1.9)
BUN SERPL-MCNC: 16 MG/DL (ref 8–23)
CALCIUM SERPL-MCNC: 8.7 MG/DL (ref 8.7–10.5)
CHLORIDE SERPL-SCNC: 115 MMOL/L (ref 95–110)
CO2 SERPL-SCNC: 19 MMOL/L (ref 23–29)
CREAT SERPL-MCNC: 1 MG/DL (ref 0.5–1.4)
DIFFERENTIAL METHOD: ABNORMAL
EOSINOPHIL # BLD AUTO: 0.2 K/UL (ref 0–0.5)
EOSINOPHIL NFR BLD: 3.5 % (ref 0–8)
ERYTHROCYTE [DISTWIDTH] IN BLOOD BY AUTOMATED COUNT: 15.2 % (ref 11.5–14.5)
EST. GFR  (AFRICAN AMERICAN): >60 ML/MIN/1.73 M^2
EST. GFR  (NON AFRICAN AMERICAN): 58 ML/MIN/1.73 M^2
GLUCOSE SERPL-MCNC: 121 MG/DL (ref 70–110)
HCT VFR BLD AUTO: 28.7 % (ref 37–48.5)
HGB BLD-MCNC: 8.9 G/DL (ref 12–16)
IMM GRANULOCYTES # BLD AUTO: 0.01 K/UL (ref 0–0.04)
IMM GRANULOCYTES NFR BLD AUTO: 0.2 % (ref 0–0.5)
LYMPHOCYTES # BLD AUTO: 1.1 K/UL (ref 1–4.8)
LYMPHOCYTES NFR BLD: 24.9 % (ref 18–48)
MCH RBC QN AUTO: 27.6 PG (ref 27–31)
MCHC RBC AUTO-ENTMCNC: 31 G/DL (ref 32–36)
MCV RBC AUTO: 89 FL (ref 82–98)
MONOCYTES # BLD AUTO: 0.5 K/UL (ref 0.3–1)
MONOCYTES NFR BLD: 10.4 % (ref 4–15)
NEUTROPHILS # BLD AUTO: 2.7 K/UL (ref 1.8–7.7)
NEUTROPHILS NFR BLD: 60.1 % (ref 38–73)
NRBC BLD-RTO: 0 /100 WBC
PLATELET # BLD AUTO: 133 K/UL (ref 150–450)
PLATELET BLD QL SMEAR: ABNORMAL
PMV BLD AUTO: 12.3 FL (ref 9.2–12.9)
POTASSIUM SERPL-SCNC: 4.4 MMOL/L (ref 3.5–5.1)
RBC # BLD AUTO: 3.23 M/UL (ref 4–5.4)
SODIUM SERPL-SCNC: 143 MMOL/L (ref 136–145)
WBC # BLD AUTO: 4.53 K/UL (ref 3.9–12.7)

## 2021-11-07 PROCEDURE — 11000001 HC ACUTE MED/SURG PRIVATE ROOM: Mod: HCNC

## 2021-11-07 PROCEDURE — 99222 1ST HOSP IP/OBS MODERATE 55: CPT | Mod: HCNC,,, | Performed by: INTERNAL MEDICINE

## 2021-11-07 PROCEDURE — 36415 COLL VENOUS BLD VENIPUNCTURE: CPT | Mod: HCNC | Performed by: FAMILY MEDICINE

## 2021-11-07 PROCEDURE — 63600175 PHARM REV CODE 636 W HCPCS: Mod: HCNC | Performed by: FAMILY MEDICINE

## 2021-11-07 PROCEDURE — 25000003 PHARM REV CODE 250: Mod: HCNC | Performed by: FAMILY MEDICINE

## 2021-11-07 PROCEDURE — 25000003 PHARM REV CODE 250: Mod: HCNC

## 2021-11-07 PROCEDURE — 80048 BASIC METABOLIC PNL TOTAL CA: CPT | Mod: HCNC | Performed by: FAMILY MEDICINE

## 2021-11-07 PROCEDURE — 27000207 HC ISOLATION: Mod: HCNC

## 2021-11-07 PROCEDURE — 85025 COMPLETE CBC W/AUTO DIFF WBC: CPT | Mod: HCNC | Performed by: FAMILY MEDICINE

## 2021-11-07 PROCEDURE — 99222 PR INITIAL HOSPITAL CARE,LEVL II: ICD-10-PCS | Mod: HCNC,,, | Performed by: INTERNAL MEDICINE

## 2021-11-07 RX ADMIN — Medication 400 MG: at 09:11

## 2021-11-07 RX ADMIN — ERTAPENEM 1 G: 1 INJECTION INTRAMUSCULAR; INTRAVENOUS at 09:11

## 2021-11-07 RX ADMIN — POTASSIUM CHLORIDE 20 MEQ: 1500 TABLET, EXTENDED RELEASE ORAL at 09:11

## 2021-11-07 RX ADMIN — LOSARTAN POTASSIUM 100 MG: 50 TABLET, FILM COATED ORAL at 09:11

## 2021-11-07 RX ADMIN — OXYCODONE 15 MG: 5 TABLET ORAL at 10:11

## 2021-11-07 RX ADMIN — PHENAZOPYRIDINE HYDROCHLORIDE 100 MG: 100 TABLET ORAL at 09:11

## 2021-11-07 RX ADMIN — METOPROLOL TARTRATE 25 MG: 25 TABLET, FILM COATED ORAL at 09:11

## 2021-11-07 RX ADMIN — PHENAZOPYRIDINE HYDROCHLORIDE 100 MG: 100 TABLET ORAL at 05:11

## 2021-11-07 RX ADMIN — OXYCODONE 15 MG: 5 TABLET ORAL at 09:11

## 2021-11-07 RX ADMIN — OXYCODONE 15 MG: 5 TABLET ORAL at 04:11

## 2021-11-07 RX ADMIN — Medication 400 MG: at 08:11

## 2021-11-07 RX ADMIN — METOPROLOL TARTRATE 25 MG: 25 TABLET, FILM COATED ORAL at 08:11

## 2021-11-07 RX ADMIN — ENOXAPARIN SODIUM 40 MG: 40 INJECTION, SOLUTION INTRAVENOUS; SUBCUTANEOUS at 05:11

## 2021-11-07 RX ADMIN — AMLODIPINE BESYLATE 10 MG: 5 TABLET ORAL at 09:11

## 2021-11-07 RX ADMIN — ATORVASTATIN CALCIUM 40 MG: 40 TABLET, FILM COATED ORAL at 08:11

## 2021-11-07 RX ADMIN — OXYCODONE 15 MG: 5 TABLET ORAL at 03:11

## 2021-11-07 RX ADMIN — PHENAZOPYRIDINE HYDROCHLORIDE 100 MG: 100 TABLET ORAL at 01:11

## 2021-11-08 ENCOUNTER — TELEPHONE (OUTPATIENT)
Dept: HEMATOLOGY/ONCOLOGY | Facility: CLINIC | Age: 71
End: 2021-11-08
Payer: MEDICARE

## 2021-11-08 LAB
ANION GAP SERPL CALC-SCNC: 9 MMOL/L (ref 8–16)
BASOPHILS # BLD AUTO: 0.03 K/UL (ref 0–0.2)
BASOPHILS NFR BLD: 0.7 % (ref 0–1.9)
BUN SERPL-MCNC: 16 MG/DL (ref 8–23)
CALCIUM SERPL-MCNC: 8.7 MG/DL (ref 8.7–10.5)
CHLORIDE SERPL-SCNC: 112 MMOL/L (ref 95–110)
CO2 SERPL-SCNC: 19 MMOL/L (ref 23–29)
CREAT SERPL-MCNC: 0.9 MG/DL (ref 0.5–1.4)
DIFFERENTIAL METHOD: ABNORMAL
EOSINOPHIL # BLD AUTO: 0.2 K/UL (ref 0–0.5)
EOSINOPHIL NFR BLD: 4.2 % (ref 0–8)
ERYTHROCYTE [DISTWIDTH] IN BLOOD BY AUTOMATED COUNT: 15.1 % (ref 11.5–14.5)
EST. GFR  (AFRICAN AMERICAN): >60 ML/MIN/1.73 M^2
EST. GFR  (NON AFRICAN AMERICAN): >60 ML/MIN/1.73 M^2
GLUCOSE SERPL-MCNC: 91 MG/DL (ref 70–110)
HCT VFR BLD AUTO: 29.4 % (ref 37–48.5)
HGB BLD-MCNC: 8.7 G/DL (ref 12–16)
IMM GRANULOCYTES # BLD AUTO: 0.01 K/UL (ref 0–0.04)
IMM GRANULOCYTES NFR BLD AUTO: 0.2 % (ref 0–0.5)
LYMPHOCYTES # BLD AUTO: 1.4 K/UL (ref 1–4.8)
LYMPHOCYTES NFR BLD: 30.4 % (ref 18–48)
MCH RBC QN AUTO: 26.8 PG (ref 27–31)
MCHC RBC AUTO-ENTMCNC: 29.6 G/DL (ref 32–36)
MCV RBC AUTO: 91 FL (ref 82–98)
MONOCYTES # BLD AUTO: 0.5 K/UL (ref 0.3–1)
MONOCYTES NFR BLD: 11.8 % (ref 4–15)
NEUTROPHILS # BLD AUTO: 2.4 K/UL (ref 1.8–7.7)
NEUTROPHILS NFR BLD: 52.7 % (ref 38–73)
NRBC BLD-RTO: 0 /100 WBC
PLATELET # BLD AUTO: 184 K/UL (ref 150–450)
PMV BLD AUTO: 11.2 FL (ref 9.2–12.9)
POTASSIUM SERPL-SCNC: 4.5 MMOL/L (ref 3.5–5.1)
RBC # BLD AUTO: 3.25 M/UL (ref 4–5.4)
SODIUM SERPL-SCNC: 140 MMOL/L (ref 136–145)
WBC # BLD AUTO: 4.51 K/UL (ref 3.9–12.7)

## 2021-11-08 PROCEDURE — 25000003 PHARM REV CODE 250: Mod: HCNC | Performed by: FAMILY MEDICINE

## 2021-11-08 PROCEDURE — 63600175 PHARM REV CODE 636 W HCPCS: Mod: HCNC | Performed by: FAMILY MEDICINE

## 2021-11-08 PROCEDURE — 36415 COLL VENOUS BLD VENIPUNCTURE: CPT | Mod: HCNC | Performed by: FAMILY MEDICINE

## 2021-11-08 PROCEDURE — 97535 SELF CARE MNGMENT TRAINING: CPT | Mod: HCNC,CO

## 2021-11-08 PROCEDURE — 80048 BASIC METABOLIC PNL TOTAL CA: CPT | Mod: HCNC | Performed by: FAMILY MEDICINE

## 2021-11-08 PROCEDURE — 97110 THERAPEUTIC EXERCISES: CPT | Mod: HCNC,CO

## 2021-11-08 PROCEDURE — 11000001 HC ACUTE MED/SURG PRIVATE ROOM: Mod: HCNC

## 2021-11-08 PROCEDURE — 97530 THERAPEUTIC ACTIVITIES: CPT | Mod: HCNC

## 2021-11-08 PROCEDURE — 85025 COMPLETE CBC W/AUTO DIFF WBC: CPT | Mod: HCNC | Performed by: FAMILY MEDICINE

## 2021-11-08 PROCEDURE — 97116 GAIT TRAINING THERAPY: CPT | Mod: HCNC

## 2021-11-08 PROCEDURE — 27000207 HC ISOLATION: Mod: HCNC

## 2021-11-08 RX ADMIN — ERTAPENEM 1 G: 1 INJECTION INTRAMUSCULAR; INTRAVENOUS at 11:11

## 2021-11-08 RX ADMIN — POTASSIUM CHLORIDE 20 MEQ: 1500 TABLET, EXTENDED RELEASE ORAL at 09:11

## 2021-11-08 RX ADMIN — Medication 400 MG: at 08:11

## 2021-11-08 RX ADMIN — OXYCODONE 15 MG: 5 TABLET ORAL at 11:11

## 2021-11-08 RX ADMIN — METOPROLOL TARTRATE 25 MG: 25 TABLET, FILM COATED ORAL at 09:11

## 2021-11-08 RX ADMIN — LOSARTAN POTASSIUM 100 MG: 50 TABLET, FILM COATED ORAL at 09:11

## 2021-11-08 RX ADMIN — Medication 400 MG: at 09:11

## 2021-11-08 RX ADMIN — OXYCODONE 15 MG: 5 TABLET ORAL at 05:11

## 2021-11-08 RX ADMIN — ATORVASTATIN CALCIUM 40 MG: 40 TABLET, FILM COATED ORAL at 08:11

## 2021-11-08 RX ADMIN — AMLODIPINE BESYLATE 10 MG: 5 TABLET ORAL at 09:11

## 2021-11-08 RX ADMIN — OXYCODONE 15 MG: 5 TABLET ORAL at 04:11

## 2021-11-08 RX ADMIN — ENOXAPARIN SODIUM 40 MG: 40 INJECTION, SOLUTION INTRAVENOUS; SUBCUTANEOUS at 04:11

## 2021-11-08 RX ADMIN — METOPROLOL TARTRATE 25 MG: 25 TABLET, FILM COATED ORAL at 08:11

## 2021-11-08 RX ADMIN — BUSPIRONE HYDROCHLORIDE 15 MG: 5 TABLET ORAL at 09:11

## 2021-11-09 ENCOUNTER — PES CALL (OUTPATIENT)
Dept: ADMINISTRATIVE | Facility: CLINIC | Age: 71
End: 2021-11-09
Payer: MEDICARE

## 2021-11-09 ENCOUNTER — TELEPHONE (OUTPATIENT)
Dept: NEUROLOGY | Facility: HOSPITAL | Age: 71
End: 2021-11-09
Payer: MEDICARE

## 2021-11-09 DIAGNOSIS — C7B.02 METASTATIC MALIGNANT CARCINOID TUMOR TO LIVER: ICD-10-CM

## 2021-11-09 DIAGNOSIS — C7A.094: ICD-10-CM

## 2021-11-09 DIAGNOSIS — C7A.012 MALIGNANT CARCINOID TUMOR OF ILEUM: ICD-10-CM

## 2021-11-09 DIAGNOSIS — C7B.02 SECONDARY MALIGNANT CARCINOID TUMOR OF LIVER: Primary | ICD-10-CM

## 2021-11-09 DIAGNOSIS — E34.0 CARCINOID SYNDROME: ICD-10-CM

## 2021-11-09 LAB
ANION GAP SERPL CALC-SCNC: 8 MMOL/L (ref 8–16)
BASOPHILS # BLD AUTO: 0.03 K/UL (ref 0–0.2)
BASOPHILS NFR BLD: 0.7 % (ref 0–1.9)
BUN SERPL-MCNC: 15 MG/DL (ref 8–23)
CALCIUM SERPL-MCNC: 9.4 MG/DL (ref 8.7–10.5)
CHLORIDE SERPL-SCNC: 112 MMOL/L (ref 95–110)
CO2 SERPL-SCNC: 22 MMOL/L (ref 23–29)
CREAT SERPL-MCNC: 0.9 MG/DL (ref 0.5–1.4)
DIFFERENTIAL METHOD: ABNORMAL
EOSINOPHIL # BLD AUTO: 0.2 K/UL (ref 0–0.5)
EOSINOPHIL NFR BLD: 3.3 % (ref 0–8)
ERYTHROCYTE [DISTWIDTH] IN BLOOD BY AUTOMATED COUNT: 14.7 % (ref 11.5–14.5)
EST. GFR  (AFRICAN AMERICAN): >60 ML/MIN/1.73 M^2
EST. GFR  (NON AFRICAN AMERICAN): >60 ML/MIN/1.73 M^2
GLUCOSE SERPL-MCNC: 106 MG/DL (ref 70–110)
HCT VFR BLD AUTO: 33.8 % (ref 37–48.5)
HGB BLD-MCNC: 9.7 G/DL (ref 12–16)
IMM GRANULOCYTES # BLD AUTO: 0.02 K/UL (ref 0–0.04)
IMM GRANULOCYTES NFR BLD AUTO: 0.4 % (ref 0–0.5)
LYMPHOCYTES # BLD AUTO: 1 K/UL (ref 1–4.8)
LYMPHOCYTES NFR BLD: 22.4 % (ref 18–48)
MCH RBC QN AUTO: 27.3 PG (ref 27–31)
MCHC RBC AUTO-ENTMCNC: 28.7 G/DL (ref 32–36)
MCV RBC AUTO: 95 FL (ref 82–98)
MONOCYTES # BLD AUTO: 0.4 K/UL (ref 0.3–1)
MONOCYTES NFR BLD: 9.6 % (ref 4–15)
NEUTROPHILS # BLD AUTO: 2.9 K/UL (ref 1.8–7.7)
NEUTROPHILS NFR BLD: 63.6 % (ref 38–73)
NRBC BLD-RTO: 0 /100 WBC
PLATELET # BLD AUTO: 180 K/UL (ref 150–450)
PMV BLD AUTO: 10.8 FL (ref 9.2–12.9)
POTASSIUM SERPL-SCNC: 4.5 MMOL/L (ref 3.5–5.1)
RBC # BLD AUTO: 3.55 M/UL (ref 4–5.4)
SODIUM SERPL-SCNC: 142 MMOL/L (ref 136–145)
WBC # BLD AUTO: 4.6 K/UL (ref 3.9–12.7)

## 2021-11-09 PROCEDURE — 25000003 PHARM REV CODE 250: Mod: HCNC | Performed by: INTERNAL MEDICINE

## 2021-11-09 PROCEDURE — 25000003 PHARM REV CODE 250: Mod: HCNC | Performed by: FAMILY MEDICINE

## 2021-11-09 PROCEDURE — 85025 COMPLETE CBC W/AUTO DIFF WBC: CPT | Mod: HCNC | Performed by: FAMILY MEDICINE

## 2021-11-09 PROCEDURE — C1751 CATH, INF, PER/CENT/MIDLINE: HCPCS | Mod: HCNC

## 2021-11-09 PROCEDURE — 63600175 PHARM REV CODE 636 W HCPCS: Mod: HCNC | Performed by: FAMILY MEDICINE

## 2021-11-09 PROCEDURE — 11000001 HC ACUTE MED/SURG PRIVATE ROOM: Mod: HCNC

## 2021-11-09 PROCEDURE — 97535 SELF CARE MNGMENT TRAINING: CPT | Mod: HCNC,CO

## 2021-11-09 PROCEDURE — 97530 THERAPEUTIC ACTIVITIES: CPT | Mod: HCNC,CQ

## 2021-11-09 PROCEDURE — A4216 STERILE WATER/SALINE, 10 ML: HCPCS | Mod: HCNC | Performed by: INTERNAL MEDICINE

## 2021-11-09 PROCEDURE — 36569 INSJ PICC 5 YR+ W/O IMAGING: CPT | Mod: HCNC

## 2021-11-09 PROCEDURE — 27000207 HC ISOLATION: Mod: HCNC

## 2021-11-09 PROCEDURE — 36415 COLL VENOUS BLD VENIPUNCTURE: CPT | Mod: HCNC | Performed by: FAMILY MEDICINE

## 2021-11-09 PROCEDURE — 80048 BASIC METABOLIC PNL TOTAL CA: CPT | Mod: HCNC | Performed by: FAMILY MEDICINE

## 2021-11-09 PROCEDURE — 97110 THERAPEUTIC EXERCISES: CPT | Mod: HCNC,CO

## 2021-11-09 RX ORDER — SODIUM CHLORIDE 0.9 % (FLUSH) 0.9 %
10 SYRINGE (ML) INJECTION EVERY 6 HOURS
Status: DISCONTINUED | OUTPATIENT
Start: 2021-11-09 | End: 2021-11-13 | Stop reason: HOSPADM

## 2021-11-09 RX ORDER — SODIUM CHLORIDE 0.9 % (FLUSH) 0.9 %
10 SYRINGE (ML) INJECTION
Status: DISCONTINUED | OUTPATIENT
Start: 2021-11-09 | End: 2021-11-13 | Stop reason: HOSPADM

## 2021-11-09 RX ADMIN — METOPROLOL TARTRATE 25 MG: 25 TABLET, FILM COATED ORAL at 09:11

## 2021-11-09 RX ADMIN — ERTAPENEM 1 G: 1 INJECTION INTRAMUSCULAR; INTRAVENOUS at 10:11

## 2021-11-09 RX ADMIN — Medication 400 MG: at 10:11

## 2021-11-09 RX ADMIN — OXYCODONE 15 MG: 5 TABLET ORAL at 11:11

## 2021-11-09 RX ADMIN — OXYCODONE 15 MG: 5 TABLET ORAL at 05:11

## 2021-11-09 RX ADMIN — LOSARTAN POTASSIUM 100 MG: 50 TABLET, FILM COATED ORAL at 09:11

## 2021-11-09 RX ADMIN — POTASSIUM CHLORIDE 20 MEQ: 1500 TABLET, EXTENDED RELEASE ORAL at 09:11

## 2021-11-09 RX ADMIN — METOPROLOL TARTRATE 25 MG: 25 TABLET, FILM COATED ORAL at 08:11

## 2021-11-09 RX ADMIN — OXYCODONE 15 MG: 5 TABLET ORAL at 10:11

## 2021-11-09 RX ADMIN — OXYCODONE 15 MG: 5 TABLET ORAL at 04:11

## 2021-11-09 RX ADMIN — ENOXAPARIN SODIUM 40 MG: 40 INJECTION, SOLUTION INTRAVENOUS; SUBCUTANEOUS at 05:11

## 2021-11-09 RX ADMIN — SODIUM CHLORIDE, PRESERVATIVE FREE 10 ML: 5 INJECTION INTRAVENOUS at 05:11

## 2021-11-09 RX ADMIN — ATORVASTATIN CALCIUM 40 MG: 40 TABLET, FILM COATED ORAL at 08:11

## 2021-11-09 RX ADMIN — AMLODIPINE BESYLATE 10 MG: 5 TABLET ORAL at 09:11

## 2021-11-09 RX ADMIN — Medication 400 MG: at 08:11

## 2021-11-10 LAB
ANION GAP SERPL CALC-SCNC: 6 MMOL/L (ref 8–16)
BACTERIA BLD CULT: NORMAL
BACTERIA BLD CULT: NORMAL
BASOPHILS # BLD AUTO: 0.03 K/UL (ref 0–0.2)
BASOPHILS NFR BLD: 0.6 % (ref 0–1.9)
BUN SERPL-MCNC: 12 MG/DL (ref 8–23)
CALCIUM SERPL-MCNC: 8.1 MG/DL (ref 8.7–10.5)
CHLORIDE SERPL-SCNC: 114 MMOL/L (ref 95–110)
CO2 SERPL-SCNC: 24 MMOL/L (ref 23–29)
CREAT SERPL-MCNC: 0.8 MG/DL (ref 0.5–1.4)
DIFFERENTIAL METHOD: ABNORMAL
EOSINOPHIL # BLD AUTO: 0.2 K/UL (ref 0–0.5)
EOSINOPHIL NFR BLD: 3.1 % (ref 0–8)
ERYTHROCYTE [DISTWIDTH] IN BLOOD BY AUTOMATED COUNT: 14.5 % (ref 11.5–14.5)
EST. GFR  (AFRICAN AMERICAN): >60 ML/MIN/1.73 M^2
EST. GFR  (NON AFRICAN AMERICAN): >60 ML/MIN/1.73 M^2
GLUCOSE SERPL-MCNC: 79 MG/DL (ref 70–110)
HCT VFR BLD AUTO: 27.5 % (ref 37–48.5)
HGB BLD-MCNC: 8.3 G/DL (ref 12–16)
IMM GRANULOCYTES # BLD AUTO: 0.01 K/UL (ref 0–0.04)
IMM GRANULOCYTES NFR BLD AUTO: 0.2 % (ref 0–0.5)
LYMPHOCYTES # BLD AUTO: 1.1 K/UL (ref 1–4.8)
LYMPHOCYTES NFR BLD: 23.5 % (ref 18–48)
MCH RBC QN AUTO: 26.9 PG (ref 27–31)
MCHC RBC AUTO-ENTMCNC: 30.2 G/DL (ref 32–36)
MCV RBC AUTO: 89 FL (ref 82–98)
MONOCYTES # BLD AUTO: 0.5 K/UL (ref 0.3–1)
MONOCYTES NFR BLD: 11 % (ref 4–15)
NEUTROPHILS # BLD AUTO: 3 K/UL (ref 1.8–7.7)
NEUTROPHILS NFR BLD: 61.6 % (ref 38–73)
NRBC BLD-RTO: 0 /100 WBC
PLATELET # BLD AUTO: 186 K/UL (ref 150–450)
PMV BLD AUTO: 11 FL (ref 9.2–12.9)
POTASSIUM SERPL-SCNC: 4.1 MMOL/L (ref 3.5–5.1)
RBC # BLD AUTO: 3.08 M/UL (ref 4–5.4)
SODIUM SERPL-SCNC: 144 MMOL/L (ref 136–145)
WBC # BLD AUTO: 4.81 K/UL (ref 3.9–12.7)

## 2021-11-10 PROCEDURE — 97535 SELF CARE MNGMENT TRAINING: CPT | Mod: HCNC,CO

## 2021-11-10 PROCEDURE — A4216 STERILE WATER/SALINE, 10 ML: HCPCS | Mod: HCNC | Performed by: INTERNAL MEDICINE

## 2021-11-10 PROCEDURE — 11000001 HC ACUTE MED/SURG PRIVATE ROOM: Mod: HCNC

## 2021-11-10 PROCEDURE — 27000207 HC ISOLATION: Mod: HCNC

## 2021-11-10 PROCEDURE — 36415 COLL VENOUS BLD VENIPUNCTURE: CPT | Mod: HCNC | Performed by: FAMILY MEDICINE

## 2021-11-10 PROCEDURE — 25000003 PHARM REV CODE 250: Mod: HCNC | Performed by: FAMILY MEDICINE

## 2021-11-10 PROCEDURE — 85025 COMPLETE CBC W/AUTO DIFF WBC: CPT | Mod: HCNC | Performed by: FAMILY MEDICINE

## 2021-11-10 PROCEDURE — 97530 THERAPEUTIC ACTIVITIES: CPT | Mod: HCNC,CO

## 2021-11-10 PROCEDURE — 63600175 PHARM REV CODE 636 W HCPCS: Mod: HCNC | Performed by: FAMILY MEDICINE

## 2021-11-10 PROCEDURE — 80048 BASIC METABOLIC PNL TOTAL CA: CPT | Mod: HCNC | Performed by: FAMILY MEDICINE

## 2021-11-10 PROCEDURE — 97530 THERAPEUTIC ACTIVITIES: CPT | Mod: HCNC

## 2021-11-10 PROCEDURE — 97116 GAIT TRAINING THERAPY: CPT | Mod: HCNC

## 2021-11-10 PROCEDURE — 25000003 PHARM REV CODE 250: Mod: HCNC | Performed by: INTERNAL MEDICINE

## 2021-11-10 RX ADMIN — Medication 400 MG: at 09:11

## 2021-11-10 RX ADMIN — ERTAPENEM 1 G: 1 INJECTION INTRAMUSCULAR; INTRAVENOUS at 09:11

## 2021-11-10 RX ADMIN — SODIUM CHLORIDE, PRESERVATIVE FREE 10 ML: 5 INJECTION INTRAVENOUS at 12:11

## 2021-11-10 RX ADMIN — OXYBUTYNIN CHLORIDE 5 MG: 5 TABLET ORAL at 09:11

## 2021-11-10 RX ADMIN — SODIUM CHLORIDE, PRESERVATIVE FREE 10 ML: 5 INJECTION INTRAVENOUS at 06:11

## 2021-11-10 RX ADMIN — OXYCODONE 15 MG: 5 TABLET ORAL at 10:11

## 2021-11-10 RX ADMIN — LOSARTAN POTASSIUM 100 MG: 50 TABLET, FILM COATED ORAL at 09:11

## 2021-11-10 RX ADMIN — Medication 400 MG: at 08:11

## 2021-11-10 RX ADMIN — METOPROLOL TARTRATE 25 MG: 25 TABLET, FILM COATED ORAL at 08:11

## 2021-11-10 RX ADMIN — ENOXAPARIN SODIUM 40 MG: 40 INJECTION, SOLUTION INTRAVENOUS; SUBCUTANEOUS at 04:11

## 2021-11-10 RX ADMIN — AMLODIPINE BESYLATE 10 MG: 5 TABLET ORAL at 09:11

## 2021-11-10 RX ADMIN — ATORVASTATIN CALCIUM 40 MG: 40 TABLET, FILM COATED ORAL at 08:11

## 2021-11-10 RX ADMIN — OXYCODONE 15 MG: 5 TABLET ORAL at 04:11

## 2021-11-10 RX ADMIN — METOPROLOL TARTRATE 25 MG: 25 TABLET, FILM COATED ORAL at 09:11

## 2021-11-10 RX ADMIN — POTASSIUM CHLORIDE 20 MEQ: 1500 TABLET, EXTENDED RELEASE ORAL at 09:11

## 2021-11-10 RX ADMIN — OXYCODONE 15 MG: 5 TABLET ORAL at 09:11

## 2021-11-11 LAB
ANION GAP SERPL CALC-SCNC: 8 MMOL/L (ref 8–16)
BASOPHILS # BLD AUTO: 0.02 K/UL (ref 0–0.2)
BASOPHILS NFR BLD: 0.4 % (ref 0–1.9)
BUN SERPL-MCNC: 17 MG/DL (ref 8–23)
CALCIUM SERPL-MCNC: 8.9 MG/DL (ref 8.7–10.5)
CHLORIDE SERPL-SCNC: 109 MMOL/L (ref 95–110)
CO2 SERPL-SCNC: 24 MMOL/L (ref 23–29)
CREAT SERPL-MCNC: 1 MG/DL (ref 0.5–1.4)
DIFFERENTIAL METHOD: ABNORMAL
EOSINOPHIL # BLD AUTO: 0.2 K/UL (ref 0–0.5)
EOSINOPHIL NFR BLD: 3.9 % (ref 0–8)
ERYTHROCYTE [DISTWIDTH] IN BLOOD BY AUTOMATED COUNT: 14.2 % (ref 11.5–14.5)
EST. GFR  (AFRICAN AMERICAN): >60 ML/MIN/1.73 M^2
EST. GFR  (NON AFRICAN AMERICAN): 58 ML/MIN/1.73 M^2
GLUCOSE SERPL-MCNC: 124 MG/DL (ref 70–110)
HCT VFR BLD AUTO: 28.3 % (ref 37–48.5)
HGB BLD-MCNC: 8.6 G/DL (ref 12–16)
IMM GRANULOCYTES # BLD AUTO: 0.03 K/UL (ref 0–0.04)
IMM GRANULOCYTES NFR BLD AUTO: 0.6 % (ref 0–0.5)
LYMPHOCYTES # BLD AUTO: 1.4 K/UL (ref 1–4.8)
LYMPHOCYTES NFR BLD: 29.6 % (ref 18–48)
MCH RBC QN AUTO: 27.1 PG (ref 27–31)
MCHC RBC AUTO-ENTMCNC: 30.4 G/DL (ref 32–36)
MCV RBC AUTO: 89 FL (ref 82–98)
MONOCYTES # BLD AUTO: 0.5 K/UL (ref 0.3–1)
MONOCYTES NFR BLD: 9.3 % (ref 4–15)
NEUTROPHILS # BLD AUTO: 2.7 K/UL (ref 1.8–7.7)
NEUTROPHILS NFR BLD: 56.2 % (ref 38–73)
NRBC BLD-RTO: 0 /100 WBC
PLATELET # BLD AUTO: 154 K/UL (ref 150–450)
PMV BLD AUTO: 11 FL (ref 9.2–12.9)
POTASSIUM SERPL-SCNC: 4.4 MMOL/L (ref 3.5–5.1)
RBC # BLD AUTO: 3.17 M/UL (ref 4–5.4)
SODIUM SERPL-SCNC: 141 MMOL/L (ref 136–145)
WBC # BLD AUTO: 4.83 K/UL (ref 3.9–12.7)

## 2021-11-11 PROCEDURE — U0005 INFEC AGEN DETEC AMPLI PROBE: HCPCS | Performed by: HOSPITALIST

## 2021-11-11 PROCEDURE — 36415 COLL VENOUS BLD VENIPUNCTURE: CPT | Mod: HCNC | Performed by: FAMILY MEDICINE

## 2021-11-11 PROCEDURE — 25000003 PHARM REV CODE 250: Mod: HCNC | Performed by: INTERNAL MEDICINE

## 2021-11-11 PROCEDURE — A4216 STERILE WATER/SALINE, 10 ML: HCPCS | Mod: HCNC | Performed by: INTERNAL MEDICINE

## 2021-11-11 PROCEDURE — 97116 GAIT TRAINING THERAPY: CPT | Mod: HCNC,CQ

## 2021-11-11 PROCEDURE — 27000207 HC ISOLATION: Mod: HCNC

## 2021-11-11 PROCEDURE — 63600175 PHARM REV CODE 636 W HCPCS: Mod: HCNC | Performed by: FAMILY MEDICINE

## 2021-11-11 PROCEDURE — 25000003 PHARM REV CODE 250: Mod: HCNC | Performed by: FAMILY MEDICINE

## 2021-11-11 PROCEDURE — U0003 INFECTIOUS AGENT DETECTION BY NUCLEIC ACID (DNA OR RNA); SEVERE ACUTE RESPIRATORY SYNDROME CORONAVIRUS 2 (SARS-COV-2) (CORONAVIRUS DISEASE [COVID-19]), AMPLIFIED PROBE TECHNIQUE, MAKING USE OF HIGH THROUGHPUT TECHNOLOGIES AS DESCRIBED BY CMS-2020-01-R: HCPCS | Mod: HCNC | Performed by: HOSPITALIST

## 2021-11-11 PROCEDURE — 80048 BASIC METABOLIC PNL TOTAL CA: CPT | Mod: HCNC | Performed by: FAMILY MEDICINE

## 2021-11-11 PROCEDURE — 97530 THERAPEUTIC ACTIVITIES: CPT | Mod: HCNC,CQ

## 2021-11-11 PROCEDURE — 97110 THERAPEUTIC EXERCISES: CPT | Mod: HCNC,CO

## 2021-11-11 PROCEDURE — 97535 SELF CARE MNGMENT TRAINING: CPT | Mod: HCNC,CO

## 2021-11-11 PROCEDURE — 11000001 HC ACUTE MED/SURG PRIVATE ROOM: Mod: HCNC

## 2021-11-11 PROCEDURE — 25000003 PHARM REV CODE 250: Mod: HCNC | Performed by: HOSPITALIST

## 2021-11-11 PROCEDURE — 97110 THERAPEUTIC EXERCISES: CPT | Mod: HCNC,CQ

## 2021-11-11 PROCEDURE — 85025 COMPLETE CBC W/AUTO DIFF WBC: CPT | Mod: HCNC | Performed by: FAMILY MEDICINE

## 2021-11-11 RX ORDER — FLUCONAZOLE 200 MG/1
200 TABLET ORAL ONCE
Status: COMPLETED | OUTPATIENT
Start: 2021-11-11 | End: 2021-11-11

## 2021-11-11 RX ADMIN — ATORVASTATIN CALCIUM 40 MG: 40 TABLET, FILM COATED ORAL at 08:11

## 2021-11-11 RX ADMIN — POTASSIUM CHLORIDE 20 MEQ: 1500 TABLET, EXTENDED RELEASE ORAL at 10:11

## 2021-11-11 RX ADMIN — AMLODIPINE BESYLATE 10 MG: 5 TABLET ORAL at 10:11

## 2021-11-11 RX ADMIN — OXYCODONE 15 MG: 5 TABLET ORAL at 05:11

## 2021-11-11 RX ADMIN — METOPROLOL TARTRATE 25 MG: 25 TABLET, FILM COATED ORAL at 10:11

## 2021-11-11 RX ADMIN — Medication 400 MG: at 08:11

## 2021-11-11 RX ADMIN — SODIUM CHLORIDE, PRESERVATIVE FREE 10 ML: 5 INJECTION INTRAVENOUS at 12:11

## 2021-11-11 RX ADMIN — FLUCONAZOLE 200 MG: 200 TABLET ORAL at 03:11

## 2021-11-11 RX ADMIN — SODIUM CHLORIDE, PRESERVATIVE FREE 10 ML: 5 INJECTION INTRAVENOUS at 06:11

## 2021-11-11 RX ADMIN — ERTAPENEM 1 G: 1 INJECTION INTRAMUSCULAR; INTRAVENOUS at 10:11

## 2021-11-11 RX ADMIN — Medication 400 MG: at 10:11

## 2021-11-11 RX ADMIN — OXYCODONE 15 MG: 5 TABLET ORAL at 12:11

## 2021-11-11 RX ADMIN — LOSARTAN POTASSIUM 100 MG: 50 TABLET, FILM COATED ORAL at 10:11

## 2021-11-11 RX ADMIN — ENOXAPARIN SODIUM 40 MG: 40 INJECTION, SOLUTION INTRAVENOUS; SUBCUTANEOUS at 06:11

## 2021-11-11 RX ADMIN — OXYCODONE 15 MG: 5 TABLET ORAL at 06:11

## 2021-11-12 LAB
ANION GAP SERPL CALC-SCNC: 5 MMOL/L (ref 8–16)
BASOPHILS # BLD AUTO: 0.03 K/UL (ref 0–0.2)
BASOPHILS NFR BLD: 0.6 % (ref 0–1.9)
BUN SERPL-MCNC: 22 MG/DL (ref 8–23)
CALCIUM SERPL-MCNC: 8.8 MG/DL (ref 8.7–10.5)
CHLORIDE SERPL-SCNC: 108 MMOL/L (ref 95–110)
CO2 SERPL-SCNC: 28 MMOL/L (ref 23–29)
CREAT SERPL-MCNC: 1.1 MG/DL (ref 0.5–1.4)
DIFFERENTIAL METHOD: ABNORMAL
EOSINOPHIL # BLD AUTO: 0.2 K/UL (ref 0–0.5)
EOSINOPHIL NFR BLD: 3.9 % (ref 0–8)
ERYTHROCYTE [DISTWIDTH] IN BLOOD BY AUTOMATED COUNT: 14.3 % (ref 11.5–14.5)
EST. GFR  (AFRICAN AMERICAN): 59 ML/MIN/1.73 M^2
EST. GFR  (NON AFRICAN AMERICAN): 51 ML/MIN/1.73 M^2
GLUCOSE SERPL-MCNC: 99 MG/DL (ref 70–110)
HCT VFR BLD AUTO: 28.1 % (ref 37–48.5)
HGB BLD-MCNC: 8.6 G/DL (ref 12–16)
IMM GRANULOCYTES # BLD AUTO: 0.01 K/UL (ref 0–0.04)
IMM GRANULOCYTES NFR BLD AUTO: 0.2 % (ref 0–0.5)
LYMPHOCYTES # BLD AUTO: 1.4 K/UL (ref 1–4.8)
LYMPHOCYTES NFR BLD: 29.9 % (ref 18–48)
MCH RBC QN AUTO: 27.7 PG (ref 27–31)
MCHC RBC AUTO-ENTMCNC: 30.6 G/DL (ref 32–36)
MCV RBC AUTO: 90 FL (ref 82–98)
MONOCYTES # BLD AUTO: 0.5 K/UL (ref 0.3–1)
MONOCYTES NFR BLD: 10.6 % (ref 4–15)
NEUTROPHILS # BLD AUTO: 2.6 K/UL (ref 1.8–7.7)
NEUTROPHILS NFR BLD: 54.8 % (ref 38–73)
NRBC BLD-RTO: 0 /100 WBC
PLATELET # BLD AUTO: 151 K/UL (ref 150–450)
PLATELET BLD QL SMEAR: ABNORMAL
PMV BLD AUTO: 11.3 FL (ref 9.2–12.9)
POTASSIUM SERPL-SCNC: 4.9 MMOL/L (ref 3.5–5.1)
RBC # BLD AUTO: 3.11 M/UL (ref 4–5.4)
SODIUM SERPL-SCNC: 141 MMOL/L (ref 136–145)
WBC # BLD AUTO: 4.82 K/UL (ref 3.9–12.7)

## 2021-11-12 PROCEDURE — 80048 BASIC METABOLIC PNL TOTAL CA: CPT | Mod: HCNC | Performed by: FAMILY MEDICINE

## 2021-11-12 PROCEDURE — 63600175 PHARM REV CODE 636 W HCPCS: Mod: HCNC | Performed by: FAMILY MEDICINE

## 2021-11-12 PROCEDURE — 25000003 PHARM REV CODE 250: Mod: HCNC | Performed by: FAMILY MEDICINE

## 2021-11-12 PROCEDURE — 27000207 HC ISOLATION: Mod: HCNC

## 2021-11-12 PROCEDURE — 85025 COMPLETE CBC W/AUTO DIFF WBC: CPT | Mod: HCNC | Performed by: FAMILY MEDICINE

## 2021-11-12 PROCEDURE — 25000003 PHARM REV CODE 250: Mod: HCNC | Performed by: INTERNAL MEDICINE

## 2021-11-12 PROCEDURE — 63600175 PHARM REV CODE 636 W HCPCS: Mod: JG,HCNC | Performed by: HOSPITALIST

## 2021-11-12 PROCEDURE — A4216 STERILE WATER/SALINE, 10 ML: HCPCS | Mod: HCNC | Performed by: INTERNAL MEDICINE

## 2021-11-12 PROCEDURE — 97535 SELF CARE MNGMENT TRAINING: CPT | Mod: HCNC,CO

## 2021-11-12 PROCEDURE — 11000001 HC ACUTE MED/SURG PRIVATE ROOM: Mod: HCNC

## 2021-11-12 PROCEDURE — 25000003 PHARM REV CODE 250: Mod: HCNC | Performed by: HOSPITALIST

## 2021-11-12 RX ORDER — LANOLIN ALCOHOL/MO/W.PET/CERES
400 CREAM (GRAM) TOPICAL 2 TIMES DAILY
Status: CANCELLED | OUTPATIENT
Start: 2021-11-12

## 2021-11-12 RX ORDER — OXYBUTYNIN CHLORIDE 5 MG/1
5 TABLET ORAL EVERY 8 HOURS PRN
Status: CANCELLED | OUTPATIENT
Start: 2021-11-12

## 2021-11-12 RX ORDER — AMOXICILLIN 250 MG
1 CAPSULE ORAL 2 TIMES DAILY
Status: CANCELLED | OUTPATIENT
Start: 2021-11-12

## 2021-11-12 RX ORDER — AMLODIPINE BESYLATE 5 MG/1
10 TABLET ORAL DAILY
Status: CANCELLED | OUTPATIENT
Start: 2021-11-13

## 2021-11-12 RX ORDER — ATORVASTATIN CALCIUM 40 MG/1
40 TABLET, FILM COATED ORAL NIGHTLY
Status: CANCELLED | OUTPATIENT
Start: 2021-11-12

## 2021-11-12 RX ORDER — FLUCONAZOLE 200 MG/1
200 TABLET ORAL ONCE
Status: COMPLETED | OUTPATIENT
Start: 2021-11-12 | End: 2021-11-12

## 2021-11-12 RX ORDER — CALCIUM CARBONATE 200(500)MG
500 TABLET,CHEWABLE ORAL 2 TIMES DAILY PRN
Status: CANCELLED | OUTPATIENT
Start: 2021-11-12

## 2021-11-12 RX ORDER — MUPIROCIN 20 MG/G
OINTMENT TOPICAL 2 TIMES DAILY
Status: CANCELLED | OUTPATIENT
Start: 2021-11-12 | End: 2021-11-17

## 2021-11-12 RX ORDER — ACETAMINOPHEN 325 MG/1
650 TABLET ORAL EVERY 4 HOURS PRN
Status: CANCELLED | OUTPATIENT
Start: 2021-11-12

## 2021-11-12 RX ORDER — ACETAMINOPHEN 325 MG/1
650 TABLET ORAL EVERY 6 HOURS PRN
Status: CANCELLED | OUTPATIENT
Start: 2021-11-12

## 2021-11-12 RX ORDER — BUSPIRONE HYDROCHLORIDE 5 MG/1
15 TABLET ORAL 3 TIMES DAILY
Status: CANCELLED | OUTPATIENT
Start: 2021-11-12

## 2021-11-12 RX ORDER — OXYCODONE HYDROCHLORIDE 5 MG/1
15 TABLET ORAL EVERY 6 HOURS PRN
Status: CANCELLED | OUTPATIENT
Start: 2021-11-12

## 2021-11-12 RX ORDER — METOPROLOL TARTRATE 25 MG/1
25 TABLET, FILM COATED ORAL 2 TIMES DAILY
Status: CANCELLED | OUTPATIENT
Start: 2021-11-12

## 2021-11-12 RX ORDER — ENOXAPARIN SODIUM 100 MG/ML
40 INJECTION SUBCUTANEOUS EVERY 24 HOURS
Status: CANCELLED | OUTPATIENT
Start: 2021-11-12

## 2021-11-12 RX ORDER — TALC
6 POWDER (GRAM) TOPICAL NIGHTLY PRN
Status: CANCELLED | OUTPATIENT
Start: 2021-11-12

## 2021-11-12 RX ORDER — LOSARTAN POTASSIUM 50 MG/1
100 TABLET ORAL DAILY
Status: CANCELLED | OUTPATIENT
Start: 2021-11-13

## 2021-11-12 RX ORDER — MUPIROCIN 20 MG/G
OINTMENT TOPICAL 2 TIMES DAILY
Status: DISCONTINUED | OUTPATIENT
Start: 2021-11-12 | End: 2021-11-13 | Stop reason: HOSPADM

## 2021-11-12 RX ADMIN — OXYCODONE 15 MG: 5 TABLET ORAL at 11:11

## 2021-11-12 RX ADMIN — OXYCODONE 15 MG: 5 TABLET ORAL at 03:11

## 2021-11-12 RX ADMIN — SODIUM CHLORIDE, PRESERVATIVE FREE 10 ML: 5 INJECTION INTRAVENOUS at 12:11

## 2021-11-12 RX ADMIN — OXYCODONE 15 MG: 5 TABLET ORAL at 10:11

## 2021-11-12 RX ADMIN — ALTEPLASE 2 MG: 2.2 INJECTION, POWDER, LYOPHILIZED, FOR SOLUTION INTRAVENOUS at 11:11

## 2021-11-12 RX ADMIN — METOPROLOL TARTRATE 25 MG: 25 TABLET, FILM COATED ORAL at 10:11

## 2021-11-12 RX ADMIN — MUPIROCIN: 20 OINTMENT TOPICAL at 09:11

## 2021-11-12 RX ADMIN — Medication 400 MG: at 10:11

## 2021-11-12 RX ADMIN — ERTAPENEM 1 G: 1 INJECTION INTRAMUSCULAR; INTRAVENOUS at 10:11

## 2021-11-12 RX ADMIN — SODIUM CHLORIDE, PRESERVATIVE FREE 10 ML: 5 INJECTION INTRAVENOUS at 05:11

## 2021-11-12 RX ADMIN — POTASSIUM CHLORIDE 20 MEQ: 1500 TABLET, EXTENDED RELEASE ORAL at 10:11

## 2021-11-12 RX ADMIN — OXYCODONE 15 MG: 5 TABLET ORAL at 04:11

## 2021-11-12 RX ADMIN — AMLODIPINE BESYLATE 10 MG: 5 TABLET ORAL at 10:11

## 2021-11-12 RX ADMIN — ATORVASTATIN CALCIUM 40 MG: 40 TABLET, FILM COATED ORAL at 09:11

## 2021-11-12 RX ADMIN — METOPROLOL TARTRATE 25 MG: 25 TABLET, FILM COATED ORAL at 09:11

## 2021-11-12 RX ADMIN — SODIUM CHLORIDE, PRESERVATIVE FREE 10 ML: 5 INJECTION INTRAVENOUS at 06:11

## 2021-11-12 RX ADMIN — ENOXAPARIN SODIUM 40 MG: 40 INJECTION, SOLUTION INTRAVENOUS; SUBCUTANEOUS at 04:11

## 2021-11-12 RX ADMIN — FLUCONAZOLE 200 MG: 200 TABLET ORAL at 10:11

## 2021-11-12 RX ADMIN — LOSARTAN POTASSIUM 100 MG: 50 TABLET, FILM COATED ORAL at 10:11

## 2021-11-12 RX ADMIN — Medication 400 MG: at 09:11

## 2021-11-13 ENCOUNTER — HOSPITAL ENCOUNTER (INPATIENT)
Facility: HOSPITAL | Age: 71
LOS: 7 days | Discharge: ANOTHER HOSPITAL | DRG: 690 | End: 2021-11-20
Attending: HOSPITALIST | Admitting: HOSPITALIST
Payer: MEDICARE

## 2021-11-13 VITALS
DIASTOLIC BLOOD PRESSURE: 66 MMHG | TEMPERATURE: 97 F | RESPIRATION RATE: 20 BRPM | HEART RATE: 69 BPM | HEIGHT: 66 IN | WEIGHT: 151.44 LBS | OXYGEN SATURATION: 97 % | SYSTOLIC BLOOD PRESSURE: 146 MMHG | BODY MASS INDEX: 24.34 KG/M2

## 2021-11-13 DIAGNOSIS — N39.0 ACUTE UTI (URINARY TRACT INFECTION): ICD-10-CM

## 2021-11-13 LAB
SARS-COV-2 RNA RESP QL NAA+PROBE: NOT DETECTED
SARS-COV-2- CYCLE NUMBER: NORMAL

## 2021-11-13 PROCEDURE — 97530 THERAPEUTIC ACTIVITIES: CPT | Mod: HCNC

## 2021-11-13 PROCEDURE — A4216 STERILE WATER/SALINE, 10 ML: HCPCS | Mod: HCNC | Performed by: INTERNAL MEDICINE

## 2021-11-13 PROCEDURE — 11000004 HC SNF PRIVATE: Mod: HCNC

## 2021-11-13 PROCEDURE — 63600175 PHARM REV CODE 636 W HCPCS: Mod: HCNC | Performed by: FAMILY MEDICINE

## 2021-11-13 PROCEDURE — 97162 PT EVAL MOD COMPLEX 30 MIN: CPT | Mod: HCNC

## 2021-11-13 PROCEDURE — 25000003 PHARM REV CODE 250: Mod: HCNC | Performed by: FAMILY MEDICINE

## 2021-11-13 PROCEDURE — 25000003 PHARM REV CODE 250: Mod: HCNC | Performed by: HOSPITALIST

## 2021-11-13 PROCEDURE — 63600175 PHARM REV CODE 636 W HCPCS: Mod: HCNC | Performed by: HOSPITALIST

## 2021-11-13 PROCEDURE — 25000003 PHARM REV CODE 250: Mod: HCNC | Performed by: INTERNAL MEDICINE

## 2021-11-13 PROCEDURE — 97116 GAIT TRAINING THERAPY: CPT | Mod: HCNC

## 2021-11-13 RX ORDER — LANOLIN ALCOHOL/MO/W.PET/CERES
400 CREAM (GRAM) TOPICAL 2 TIMES DAILY
Status: DISCONTINUED | OUTPATIENT
Start: 2021-11-13 | End: 2021-11-15

## 2021-11-13 RX ORDER — TALC
6 POWDER (GRAM) TOPICAL NIGHTLY PRN
Status: DISCONTINUED | OUTPATIENT
Start: 2021-11-13 | End: 2021-11-21 | Stop reason: HOSPADM

## 2021-11-13 RX ORDER — MUPIROCIN 20 MG/G
OINTMENT TOPICAL 2 TIMES DAILY
Status: DISPENSED | OUTPATIENT
Start: 2021-11-13 | End: 2021-11-18

## 2021-11-13 RX ORDER — TALC
6 POWDER (GRAM) TOPICAL NIGHTLY PRN
Status: DISCONTINUED | OUTPATIENT
Start: 2021-11-13 | End: 2021-11-13 | Stop reason: SDUPTHER

## 2021-11-13 RX ORDER — OXYBUTYNIN CHLORIDE 5 MG/1
5 TABLET ORAL EVERY 8 HOURS PRN
Status: DISCONTINUED | OUTPATIENT
Start: 2021-11-13 | End: 2021-11-21 | Stop reason: HOSPADM

## 2021-11-13 RX ORDER — AMOXICILLIN 250 MG
1 CAPSULE ORAL 2 TIMES DAILY
Status: DISCONTINUED | OUTPATIENT
Start: 2021-11-13 | End: 2021-11-21 | Stop reason: HOSPADM

## 2021-11-13 RX ORDER — METOPROLOL TARTRATE 25 MG/1
25 TABLET, FILM COATED ORAL 2 TIMES DAILY
Status: DISCONTINUED | OUTPATIENT
Start: 2021-11-13 | End: 2021-11-21 | Stop reason: HOSPADM

## 2021-11-13 RX ORDER — ENOXAPARIN SODIUM 100 MG/ML
40 INJECTION SUBCUTANEOUS EVERY 24 HOURS
Status: DISCONTINUED | OUTPATIENT
Start: 2021-11-13 | End: 2021-11-20 | Stop reason: DRUGHIGH

## 2021-11-13 RX ORDER — ACETAMINOPHEN 325 MG/1
650 TABLET ORAL EVERY 4 HOURS PRN
Status: DISCONTINUED | OUTPATIENT
Start: 2021-11-13 | End: 2021-11-21 | Stop reason: HOSPADM

## 2021-11-13 RX ORDER — ATORVASTATIN CALCIUM 20 MG/1
40 TABLET, FILM COATED ORAL NIGHTLY
Status: DISCONTINUED | OUTPATIENT
Start: 2021-11-13 | End: 2021-11-21 | Stop reason: HOSPADM

## 2021-11-13 RX ORDER — LOSARTAN POTASSIUM 50 MG/1
100 TABLET ORAL DAILY
Status: DISCONTINUED | OUTPATIENT
Start: 2021-11-14 | End: 2021-11-17

## 2021-11-13 RX ORDER — CALCIUM CARBONATE 200(500)MG
500 TABLET,CHEWABLE ORAL 2 TIMES DAILY PRN
Status: DISCONTINUED | OUTPATIENT
Start: 2021-11-13 | End: 2021-11-21 | Stop reason: HOSPADM

## 2021-11-13 RX ORDER — ACETAMINOPHEN 325 MG/1
650 TABLET ORAL EVERY 6 HOURS PRN
Status: DISCONTINUED | OUTPATIENT
Start: 2021-11-13 | End: 2021-11-13 | Stop reason: SDUPTHER

## 2021-11-13 RX ORDER — AMLODIPINE BESYLATE 10 MG/1
10 TABLET ORAL DAILY
Status: DISCONTINUED | OUTPATIENT
Start: 2021-11-14 | End: 2021-11-17

## 2021-11-13 RX ADMIN — MUPIROCIN: 20 OINTMENT TOPICAL at 10:11

## 2021-11-13 RX ADMIN — Medication 400 MG: at 09:11

## 2021-11-13 RX ADMIN — OXYCODONE HYDROCHLORIDE 15 MG: 10 TABLET ORAL at 04:11

## 2021-11-13 RX ADMIN — MUPIROCIN: 20 OINTMENT TOPICAL at 08:11

## 2021-11-13 RX ADMIN — SODIUM CHLORIDE, PRESERVATIVE FREE 10 ML: 5 INJECTION INTRAVENOUS at 06:11

## 2021-11-13 RX ADMIN — ATORVASTATIN CALCIUM 40 MG: 20 TABLET, FILM COATED ORAL at 09:11

## 2021-11-13 RX ADMIN — ERTAPENEM 1 G: 1 INJECTION INTRAMUSCULAR; INTRAVENOUS at 09:11

## 2021-11-13 RX ADMIN — LOSARTAN POTASSIUM 100 MG: 50 TABLET, FILM COATED ORAL at 08:11

## 2021-11-13 RX ADMIN — SODIUM CHLORIDE, PRESERVATIVE FREE 10 ML: 5 INJECTION INTRAVENOUS at 11:11

## 2021-11-13 RX ADMIN — METOPROLOL TARTRATE 25 MG: 25 TABLET, FILM COATED ORAL at 09:11

## 2021-11-13 RX ADMIN — POTASSIUM CHLORIDE 20 MEQ: 1500 TABLET, EXTENDED RELEASE ORAL at 08:11

## 2021-11-13 RX ADMIN — OXYCODONE 15 MG: 5 TABLET ORAL at 08:11

## 2021-11-13 RX ADMIN — METOPROLOL TARTRATE 25 MG: 25 TABLET, FILM COATED ORAL at 08:11

## 2021-11-13 RX ADMIN — Medication 400 MG: at 08:11

## 2021-11-13 RX ADMIN — AMLODIPINE BESYLATE 10 MG: 5 TABLET ORAL at 08:11

## 2021-11-13 RX ADMIN — BUSPIRONE HYDROCHLORIDE 15 MG: 5 TABLET ORAL at 08:11

## 2021-11-13 RX ADMIN — ENOXAPARIN SODIUM 40 MG: 40 INJECTION, SOLUTION INTRAVENOUS; SUBCUTANEOUS at 04:11

## 2021-11-14 PROCEDURE — 63600175 PHARM REV CODE 636 W HCPCS: Mod: HCNC | Performed by: HOSPITALIST

## 2021-11-14 PROCEDURE — 97535 SELF CARE MNGMENT TRAINING: CPT | Mod: HCNC

## 2021-11-14 PROCEDURE — 25000003 PHARM REV CODE 250: Mod: HCNC | Performed by: HOSPITALIST

## 2021-11-14 PROCEDURE — 11000004 HC SNF PRIVATE: Mod: HCNC

## 2021-11-14 PROCEDURE — 97165 OT EVAL LOW COMPLEX 30 MIN: CPT | Mod: HCNC

## 2021-11-14 RX ADMIN — Medication 400 MG: at 10:11

## 2021-11-14 RX ADMIN — Medication 400 MG: at 08:11

## 2021-11-14 RX ADMIN — AMLODIPINE BESYLATE 10 MG: 10 TABLET ORAL at 08:11

## 2021-11-14 RX ADMIN — OXYCODONE HYDROCHLORIDE 15 MG: 10 TABLET ORAL at 02:11

## 2021-11-14 RX ADMIN — METOPROLOL TARTRATE 25 MG: 25 TABLET, FILM COATED ORAL at 08:11

## 2021-11-14 RX ADMIN — LOSARTAN POTASSIUM 100 MG: 50 TABLET, FILM COATED ORAL at 08:11

## 2021-11-14 RX ADMIN — ERTAPENEM 1 G: 1 INJECTION INTRAMUSCULAR; INTRAVENOUS at 09:11

## 2021-11-14 RX ADMIN — OXYCODONE HYDROCHLORIDE 15 MG: 10 TABLET ORAL at 10:11

## 2021-11-14 RX ADMIN — METOPROLOL TARTRATE 25 MG: 25 TABLET, FILM COATED ORAL at 10:11

## 2021-11-14 RX ADMIN — ATORVASTATIN CALCIUM 40 MG: 20 TABLET, FILM COATED ORAL at 10:11

## 2021-11-14 RX ADMIN — ENOXAPARIN SODIUM 40 MG: 40 INJECTION, SOLUTION INTRAVENOUS; SUBCUTANEOUS at 04:11

## 2021-11-14 RX ADMIN — OXYCODONE HYDROCHLORIDE 15 MG: 10 TABLET ORAL at 04:11

## 2021-11-15 ENCOUNTER — LAB VISIT (OUTPATIENT)
Dept: LAB | Facility: OTHER | Age: 71
End: 2021-11-15
Payer: MEDICARE

## 2021-11-15 ENCOUNTER — PATIENT OUTREACH (OUTPATIENT)
Dept: ADMINISTRATIVE | Facility: OTHER | Age: 71
End: 2021-11-15
Payer: MEDICARE

## 2021-11-15 DIAGNOSIS — Z20.822 ENCOUNTER FOR LABORATORY TESTING FOR COVID-19 VIRUS: ICD-10-CM

## 2021-11-15 DIAGNOSIS — Z12.31 ENCOUNTER FOR SCREENING MAMMOGRAM FOR MALIGNANT NEOPLASM OF BREAST: Primary | ICD-10-CM

## 2021-11-15 PROBLEM — R50.9 FEVER: Status: RESOLVED | Noted: 2021-10-05 | Resolved: 2021-11-15

## 2021-11-15 PROBLEM — E44.0 MODERATE MALNUTRITION: Status: ACTIVE | Noted: 2021-11-15

## 2021-11-15 LAB
ANION GAP SERPL CALC-SCNC: 6 MMOL/L (ref 8–16)
BASOPHILS # BLD AUTO: 0.04 K/UL (ref 0–0.2)
BASOPHILS NFR BLD: 0.8 % (ref 0–1.9)
BUN SERPL-MCNC: 21 MG/DL (ref 8–23)
CALCIUM SERPL-MCNC: 9.2 MG/DL (ref 8.7–10.5)
CHLORIDE SERPL-SCNC: 108 MMOL/L (ref 95–110)
CO2 SERPL-SCNC: 28 MMOL/L (ref 23–29)
CREAT SERPL-MCNC: 1 MG/DL (ref 0.5–1.4)
DIFFERENTIAL METHOD: ABNORMAL
EOSINOPHIL # BLD AUTO: 0.2 K/UL (ref 0–0.5)
EOSINOPHIL NFR BLD: 3.4 % (ref 0–8)
ERYTHROCYTE [DISTWIDTH] IN BLOOD BY AUTOMATED COUNT: 14.2 % (ref 11.5–14.5)
EST. GFR  (AFRICAN AMERICAN): >60 ML/MIN/1.73 M^2
EST. GFR  (NON AFRICAN AMERICAN): 57.6 ML/MIN/1.73 M^2
GLUCOSE SERPL-MCNC: 88 MG/DL (ref 70–110)
HCT VFR BLD AUTO: 28.8 % (ref 37–48.5)
HGB BLD-MCNC: 8.5 G/DL (ref 12–16)
IMM GRANULOCYTES # BLD AUTO: 0.01 K/UL (ref 0–0.04)
IMM GRANULOCYTES NFR BLD AUTO: 0.2 % (ref 0–0.5)
LYMPHOCYTES # BLD AUTO: 1.7 K/UL (ref 1–4.8)
LYMPHOCYTES NFR BLD: 32.9 % (ref 18–48)
MAGNESIUM SERPL-MCNC: 2.3 MG/DL (ref 1.6–2.6)
MCH RBC QN AUTO: 27.3 PG (ref 27–31)
MCHC RBC AUTO-ENTMCNC: 29.5 G/DL (ref 32–36)
MCV RBC AUTO: 93 FL (ref 82–98)
MONOCYTES # BLD AUTO: 0.6 K/UL (ref 0.3–1)
MONOCYTES NFR BLD: 10.5 % (ref 4–15)
NEUTROPHILS # BLD AUTO: 2.7 K/UL (ref 1.8–7.7)
NEUTROPHILS NFR BLD: 52.2 % (ref 38–73)
NRBC BLD-RTO: 0 /100 WBC
PHOSPHATE SERPL-MCNC: 3.1 MG/DL (ref 2.7–4.5)
PLATELET # BLD AUTO: 187 K/UL (ref 150–450)
PMV BLD AUTO: 11.2 FL (ref 9.2–12.9)
POTASSIUM SERPL-SCNC: 5.7 MMOL/L (ref 3.5–5.1)
RBC # BLD AUTO: 3.11 M/UL (ref 4–5.4)
SODIUM SERPL-SCNC: 142 MMOL/L (ref 136–145)
WBC # BLD AUTO: 5.23 K/UL (ref 3.9–12.7)

## 2021-11-15 PROCEDURE — 25000003 PHARM REV CODE 250: Mod: HCNC | Performed by: NURSE PRACTITIONER

## 2021-11-15 PROCEDURE — 11000004 HC SNF PRIVATE: Mod: HCNC

## 2021-11-15 PROCEDURE — 63600175 PHARM REV CODE 636 W HCPCS: Mod: HCNC | Performed by: HOSPITALIST

## 2021-11-15 PROCEDURE — 80048 BASIC METABOLIC PNL TOTAL CA: CPT | Mod: HCNC | Performed by: HOSPITALIST

## 2021-11-15 PROCEDURE — 85025 COMPLETE CBC W/AUTO DIFF WBC: CPT | Mod: HCNC | Performed by: HOSPITALIST

## 2021-11-15 PROCEDURE — 83735 ASSAY OF MAGNESIUM: CPT | Mod: HCNC | Performed by: HOSPITALIST

## 2021-11-15 PROCEDURE — 84100 ASSAY OF PHOSPHORUS: CPT | Mod: HCNC | Performed by: HOSPITALIST

## 2021-11-15 PROCEDURE — 25000003 PHARM REV CODE 250: Mod: HCNC | Performed by: HOSPITALIST

## 2021-11-15 PROCEDURE — U0003 INFECTIOUS AGENT DETECTION BY NUCLEIC ACID (DNA OR RNA); SEVERE ACUTE RESPIRATORY SYNDROME CORONAVIRUS 2 (SARS-COV-2) (CORONAVIRUS DISEASE [COVID-19]), AMPLIFIED PROBE TECHNIQUE, MAKING USE OF HIGH THROUGHPUT TECHNOLOGIES AS DESCRIBED BY CMS-2020-01-R: HCPCS | Mod: HCNC | Performed by: NURSE PRACTITIONER

## 2021-11-15 RX ORDER — SIMETHICONE 80 MG
1 TABLET,CHEWABLE ORAL 3 TIMES DAILY PRN
Status: DISCONTINUED | OUTPATIENT
Start: 2021-11-15 | End: 2021-11-21 | Stop reason: HOSPADM

## 2021-11-15 RX ORDER — LOPERAMIDE HYDROCHLORIDE 2 MG/1
2 CAPSULE ORAL 4 TIMES DAILY PRN
Status: DISCONTINUED | OUTPATIENT
Start: 2021-11-15 | End: 2021-11-21 | Stop reason: HOSPADM

## 2021-11-15 RX ORDER — ALUMINUM HYDROXIDE, MAGNESIUM HYDROXIDE, AND SIMETHICONE 2400; 240; 2400 MG/30ML; MG/30ML; MG/30ML
30 SUSPENSION ORAL EVERY 6 HOURS PRN
Status: DISCONTINUED | OUTPATIENT
Start: 2021-11-15 | End: 2021-11-21 | Stop reason: HOSPADM

## 2021-11-15 RX ADMIN — SODIUM POLYSTYRENE SULFONATE 15 G: 15 SUSPENSION ORAL; RECTAL at 12:11

## 2021-11-15 RX ADMIN — ENOXAPARIN SODIUM 40 MG: 40 INJECTION, SOLUTION INTRAVENOUS; SUBCUTANEOUS at 04:11

## 2021-11-15 RX ADMIN — MUPIROCIN: 20 OINTMENT TOPICAL at 09:11

## 2021-11-15 RX ADMIN — OXYCODONE HYDROCHLORIDE 15 MG: 10 TABLET ORAL at 09:11

## 2021-11-15 RX ADMIN — OXYCODONE HYDROCHLORIDE 15 MG: 10 TABLET ORAL at 04:11

## 2021-11-15 RX ADMIN — DOCUSATE SODIUM AND SENNOSIDES 1 TABLET: 8.6; 5 TABLET, FILM COATED ORAL at 08:11

## 2021-11-15 RX ADMIN — AMLODIPINE BESYLATE 10 MG: 10 TABLET ORAL at 09:11

## 2021-11-15 RX ADMIN — LOSARTAN POTASSIUM 100 MG: 50 TABLET, FILM COATED ORAL at 09:11

## 2021-11-15 RX ADMIN — ATORVASTATIN CALCIUM 40 MG: 20 TABLET, FILM COATED ORAL at 08:11

## 2021-11-15 RX ADMIN — BUSPIRONE HYDROCHLORIDE 15 MG: 5 TABLET ORAL at 09:11

## 2021-11-15 RX ADMIN — METOPROLOL TARTRATE 25 MG: 25 TABLET, FILM COATED ORAL at 08:11

## 2021-11-15 RX ADMIN — Medication 400 MG: at 09:11

## 2021-11-15 RX ADMIN — SIMETHICONE 80 MG: 80 TABLET, CHEWABLE ORAL at 02:11

## 2021-11-15 RX ADMIN — METOPROLOL TARTRATE 25 MG: 25 TABLET, FILM COATED ORAL at 09:11

## 2021-11-16 LAB
SARS-COV-2 RNA RESP QL NAA+PROBE: NOT DETECTED
SARS-COV-2- CYCLE NUMBER: NORMAL

## 2021-11-16 PROCEDURE — 11000004 HC SNF PRIVATE: Mod: HCNC

## 2021-11-16 PROCEDURE — 97530 THERAPEUTIC ACTIVITIES: CPT | Mod: HCNC,CQ

## 2021-11-16 PROCEDURE — 97110 THERAPEUTIC EXERCISES: CPT | Mod: HCNC,CQ

## 2021-11-16 PROCEDURE — 25000003 PHARM REV CODE 250: Mod: HCNC | Performed by: HOSPITALIST

## 2021-11-16 PROCEDURE — 97530 THERAPEUTIC ACTIVITIES: CPT | Mod: HCNC,CO

## 2021-11-16 PROCEDURE — 25000003 PHARM REV CODE 250: Mod: HCNC | Performed by: NURSE PRACTITIONER

## 2021-11-16 PROCEDURE — 63600175 PHARM REV CODE 636 W HCPCS: Mod: HCNC | Performed by: HOSPITALIST

## 2021-11-16 PROCEDURE — 97116 GAIT TRAINING THERAPY: CPT | Mod: HCNC,CQ

## 2021-11-16 PROCEDURE — 97535 SELF CARE MNGMENT TRAINING: CPT | Mod: HCNC,CO

## 2021-11-16 RX ADMIN — DOCUSATE SODIUM AND SENNOSIDES 1 TABLET: 8.6; 5 TABLET, FILM COATED ORAL at 10:11

## 2021-11-16 RX ADMIN — Medication 1 CAPSULE: at 10:11

## 2021-11-16 RX ADMIN — METOPROLOL TARTRATE 25 MG: 25 TABLET, FILM COATED ORAL at 10:11

## 2021-11-16 RX ADMIN — AMLODIPINE BESYLATE 10 MG: 10 TABLET ORAL at 10:11

## 2021-11-16 RX ADMIN — MUPIROCIN: 20 OINTMENT TOPICAL at 10:11

## 2021-11-16 RX ADMIN — ENOXAPARIN SODIUM 40 MG: 40 INJECTION, SOLUTION INTRAVENOUS; SUBCUTANEOUS at 05:11

## 2021-11-16 RX ADMIN — LOSARTAN POTASSIUM 100 MG: 50 TABLET, FILM COATED ORAL at 10:11

## 2021-11-16 RX ADMIN — MUPIROCIN: 20 OINTMENT TOPICAL at 09:11

## 2021-11-16 RX ADMIN — OXYCODONE HYDROCHLORIDE 15 MG: 10 TABLET ORAL at 05:11

## 2021-11-16 RX ADMIN — OXYCODONE HYDROCHLORIDE 15 MG: 10 TABLET ORAL at 06:11

## 2021-11-16 RX ADMIN — ATORVASTATIN CALCIUM 40 MG: 20 TABLET, FILM COATED ORAL at 09:11

## 2021-11-17 PROCEDURE — 97530 THERAPEUTIC ACTIVITIES: CPT | Mod: HCNC,CQ

## 2021-11-17 PROCEDURE — 11000004 HC SNF PRIVATE: Mod: HCNC

## 2021-11-17 PROCEDURE — 25000003 PHARM REV CODE 250: Mod: HCNC | Performed by: NURSE PRACTITIONER

## 2021-11-17 PROCEDURE — 25000003 PHARM REV CODE 250: Mod: HCNC | Performed by: HOSPITALIST

## 2021-11-17 PROCEDURE — 97530 THERAPEUTIC ACTIVITIES: CPT | Mod: HCNC,CO

## 2021-11-17 PROCEDURE — 97110 THERAPEUTIC EXERCISES: CPT | Mod: HCNC,CQ

## 2021-11-17 PROCEDURE — 63600175 PHARM REV CODE 636 W HCPCS: Mod: HCNC | Performed by: HOSPITALIST

## 2021-11-17 RX ORDER — LOSARTAN POTASSIUM 50 MG/1
50 TABLET ORAL DAILY
Status: DISCONTINUED | OUTPATIENT
Start: 2021-11-18 | End: 2021-11-19

## 2021-11-17 RX ADMIN — ENOXAPARIN SODIUM 40 MG: 40 INJECTION, SOLUTION INTRAVENOUS; SUBCUTANEOUS at 05:11

## 2021-11-17 RX ADMIN — OXYCODONE HYDROCHLORIDE 15 MG: 10 TABLET ORAL at 08:11

## 2021-11-17 RX ADMIN — MUPIROCIN: 20 OINTMENT TOPICAL at 08:11

## 2021-11-17 RX ADMIN — METOPROLOL TARTRATE 25 MG: 25 TABLET, FILM COATED ORAL at 08:11

## 2021-11-17 RX ADMIN — Medication 1 CAPSULE: at 09:11

## 2021-11-17 RX ADMIN — OXYCODONE HYDROCHLORIDE 15 MG: 10 TABLET ORAL at 05:11

## 2021-11-17 RX ADMIN — MUPIROCIN: 20 OINTMENT TOPICAL at 09:11

## 2021-11-17 RX ADMIN — OXYCODONE HYDROCHLORIDE 15 MG: 10 TABLET ORAL at 10:11

## 2021-11-17 RX ADMIN — ATORVASTATIN CALCIUM 40 MG: 20 TABLET, FILM COATED ORAL at 08:11

## 2021-11-18 ENCOUNTER — LAB VISIT (OUTPATIENT)
Dept: LAB | Facility: OTHER | Age: 71
End: 2021-11-18
Payer: MEDICARE

## 2021-11-18 DIAGNOSIS — Z20.822 ENCOUNTER FOR LABORATORY TESTING FOR COVID-19 VIRUS: ICD-10-CM

## 2021-11-18 LAB
SARS-COV-2 RNA RESP QL NAA+PROBE: NOT DETECTED
SARS-COV-2- CYCLE NUMBER: NORMAL

## 2021-11-18 PROCEDURE — 25000003 PHARM REV CODE 250: Mod: HCNC | Performed by: NURSE PRACTITIONER

## 2021-11-18 PROCEDURE — 97535 SELF CARE MNGMENT TRAINING: CPT | Mod: HCNC

## 2021-11-18 PROCEDURE — 97110 THERAPEUTIC EXERCISES: CPT | Mod: HCNC

## 2021-11-18 PROCEDURE — 25000003 PHARM REV CODE 250: Mod: HCNC | Performed by: HOSPITALIST

## 2021-11-18 PROCEDURE — 97530 THERAPEUTIC ACTIVITIES: CPT | Mod: HCNC

## 2021-11-18 PROCEDURE — U0003 INFECTIOUS AGENT DETECTION BY NUCLEIC ACID (DNA OR RNA); SEVERE ACUTE RESPIRATORY SYNDROME CORONAVIRUS 2 (SARS-COV-2) (CORONAVIRUS DISEASE [COVID-19]), AMPLIFIED PROBE TECHNIQUE, MAKING USE OF HIGH THROUGHPUT TECHNOLOGIES AS DESCRIBED BY CMS-2020-01-R: HCPCS | Mod: HCNC | Performed by: NURSE PRACTITIONER

## 2021-11-18 PROCEDURE — 97116 GAIT TRAINING THERAPY: CPT | Mod: HCNC

## 2021-11-18 PROCEDURE — 11000004 HC SNF PRIVATE: Mod: HCNC

## 2021-11-18 PROCEDURE — 63600175 PHARM REV CODE 636 W HCPCS: Mod: HCNC | Performed by: HOSPITALIST

## 2021-11-18 RX ADMIN — METOPROLOL TARTRATE 25 MG: 25 TABLET, FILM COATED ORAL at 08:11

## 2021-11-18 RX ADMIN — DOCUSATE SODIUM AND SENNOSIDES 1 TABLET: 8.6; 5 TABLET, FILM COATED ORAL at 08:11

## 2021-11-18 RX ADMIN — ENOXAPARIN SODIUM 40 MG: 40 INJECTION, SOLUTION INTRAVENOUS; SUBCUTANEOUS at 05:11

## 2021-11-18 RX ADMIN — Medication 1 CAPSULE: at 08:11

## 2021-11-18 RX ADMIN — LOSARTAN POTASSIUM 50 MG: 50 TABLET, FILM COATED ORAL at 08:11

## 2021-11-18 RX ADMIN — ATORVASTATIN CALCIUM 40 MG: 20 TABLET, FILM COATED ORAL at 08:11

## 2021-11-18 RX ADMIN — OXYCODONE HYDROCHLORIDE 15 MG: 10 TABLET ORAL at 08:11

## 2021-11-19 LAB
ANION GAP SERPL CALC-SCNC: 9 MMOL/L (ref 8–16)
BACTERIA #/AREA URNS AUTO: ABNORMAL /HPF
BASOPHILS # BLD AUTO: 0.04 K/UL (ref 0–0.2)
BASOPHILS NFR BLD: 0.5 % (ref 0–1.9)
BILIRUB UR QL STRIP: NEGATIVE
BUN SERPL-MCNC: 46 MG/DL (ref 8–23)
CALCIUM SERPL-MCNC: 9.5 MG/DL (ref 8.7–10.5)
CHLORIDE SERPL-SCNC: 105 MMOL/L (ref 95–110)
CLARITY UR REFRACT.AUTO: ABNORMAL
CO2 SERPL-SCNC: 25 MMOL/L (ref 23–29)
COLOR UR AUTO: YELLOW
CREAT SERPL-MCNC: 2.1 MG/DL (ref 0.5–1.4)
DIFFERENTIAL METHOD: ABNORMAL
EOSINOPHIL # BLD AUTO: 0.1 K/UL (ref 0–0.5)
EOSINOPHIL NFR BLD: 1.6 % (ref 0–8)
ERYTHROCYTE [DISTWIDTH] IN BLOOD BY AUTOMATED COUNT: 13.9 % (ref 11.5–14.5)
EST. GFR  (AFRICAN AMERICAN): 27.1 ML/MIN/1.73 M^2
EST. GFR  (NON AFRICAN AMERICAN): 23.5 ML/MIN/1.73 M^2
GLUCOSE SERPL-MCNC: 92 MG/DL (ref 70–110)
GLUCOSE UR QL STRIP: NEGATIVE
HCT VFR BLD AUTO: 28.8 % (ref 37–48.5)
HGB BLD-MCNC: 8.6 G/DL (ref 12–16)
HGB UR QL STRIP: NEGATIVE
HYALINE CASTS UR QL AUTO: 5 /LPF
IMM GRANULOCYTES # BLD AUTO: 0.04 K/UL (ref 0–0.04)
IMM GRANULOCYTES NFR BLD AUTO: 0.5 % (ref 0–0.5)
KETONES UR QL STRIP: NEGATIVE
LEUKOCYTE ESTERASE UR QL STRIP: ABNORMAL
LYMPHOCYTES # BLD AUTO: 1.3 K/UL (ref 1–4.8)
LYMPHOCYTES NFR BLD: 14.7 % (ref 18–48)
MAGNESIUM SERPL-MCNC: 2.5 MG/DL (ref 1.6–2.6)
MCH RBC QN AUTO: 26.5 PG (ref 27–31)
MCHC RBC AUTO-ENTMCNC: 29.9 G/DL (ref 32–36)
MCV RBC AUTO: 89 FL (ref 82–98)
MICROSCOPIC COMMENT: ABNORMAL
MONOCYTES # BLD AUTO: 1.1 K/UL (ref 0.3–1)
MONOCYTES NFR BLD: 12.9 % (ref 4–15)
NEUTROPHILS # BLD AUTO: 6 K/UL (ref 1.8–7.7)
NEUTROPHILS NFR BLD: 69.8 % (ref 38–73)
NITRITE UR QL STRIP: NEGATIVE
NRBC BLD-RTO: 0 /100 WBC
PH UR STRIP: 5 [PH] (ref 5–8)
PHOSPHATE SERPL-MCNC: 4.3 MG/DL (ref 2.7–4.5)
PLATELET # BLD AUTO: 202 K/UL (ref 150–450)
PMV BLD AUTO: 11.5 FL (ref 9.2–12.9)
POTASSIUM SERPL-SCNC: 5.3 MMOL/L (ref 3.5–5.1)
PROT UR QL STRIP: NEGATIVE
RBC # BLD AUTO: 3.24 M/UL (ref 4–5.4)
RBC #/AREA URNS AUTO: 3 /HPF (ref 0–4)
SODIUM SERPL-SCNC: 139 MMOL/L (ref 136–145)
SP GR UR STRIP: 1.01 (ref 1–1.03)
SQUAMOUS #/AREA URNS AUTO: 2 /HPF
URN SPEC COLLECT METH UR: ABNORMAL
WBC # BLD AUTO: 8.65 K/UL (ref 3.9–12.7)
WBC #/AREA URNS AUTO: 7 /HPF (ref 0–5)

## 2021-11-19 PROCEDURE — 85025 COMPLETE CBC W/AUTO DIFF WBC: CPT | Mod: HCNC | Performed by: NURSE PRACTITIONER

## 2021-11-19 PROCEDURE — 84100 ASSAY OF PHOSPHORUS: CPT | Mod: HCNC | Performed by: NURSE PRACTITIONER

## 2021-11-19 PROCEDURE — 97535 SELF CARE MNGMENT TRAINING: CPT | Mod: HCNC,CO

## 2021-11-19 PROCEDURE — 25000003 PHARM REV CODE 250: Mod: HCNC | Performed by: NURSE PRACTITIONER

## 2021-11-19 PROCEDURE — 11000004 HC SNF PRIVATE: Mod: HCNC

## 2021-11-19 PROCEDURE — 81001 URINALYSIS AUTO W/SCOPE: CPT | Mod: HCNC | Performed by: NURSE PRACTITIONER

## 2021-11-19 PROCEDURE — 25000003 PHARM REV CODE 250: Mod: HCNC | Performed by: HOSPITALIST

## 2021-11-19 PROCEDURE — 80048 BASIC METABOLIC PNL TOTAL CA: CPT | Mod: HCNC | Performed by: NURSE PRACTITIONER

## 2021-11-19 PROCEDURE — 97530 THERAPEUTIC ACTIVITIES: CPT | Mod: HCNC,CQ

## 2021-11-19 PROCEDURE — 83735 ASSAY OF MAGNESIUM: CPT | Mod: HCNC | Performed by: NURSE PRACTITIONER

## 2021-11-19 PROCEDURE — 63600175 PHARM REV CODE 636 W HCPCS: Mod: HCNC | Performed by: HOSPITALIST

## 2021-11-19 RX ORDER — HYDRALAZINE HYDROCHLORIDE 25 MG/1
25 TABLET, FILM COATED ORAL EVERY 8 HOURS
Status: DISCONTINUED | OUTPATIENT
Start: 2021-11-19 | End: 2021-11-21 | Stop reason: HOSPADM

## 2021-11-19 RX ORDER — SODIUM CHLORIDE 9 MG/ML
INJECTION, SOLUTION INTRAVENOUS CONTINUOUS
Status: ACTIVE | OUTPATIENT
Start: 2021-11-19 | End: 2021-11-19

## 2021-11-19 RX ADMIN — LOSARTAN POTASSIUM 50 MG: 50 TABLET, FILM COATED ORAL at 09:11

## 2021-11-19 RX ADMIN — HYDRALAZINE HYDROCHLORIDE 25 MG: 25 TABLET, FILM COATED ORAL at 09:11

## 2021-11-19 RX ADMIN — HYDRALAZINE HYDROCHLORIDE 25 MG: 25 TABLET, FILM COATED ORAL at 02:11

## 2021-11-19 RX ADMIN — ACETAMINOPHEN 650 MG: 325 TABLET ORAL at 09:11

## 2021-11-19 RX ADMIN — Medication 1 CAPSULE: at 09:11

## 2021-11-19 RX ADMIN — OXYCODONE HYDROCHLORIDE 15 MG: 10 TABLET ORAL at 09:11

## 2021-11-19 RX ADMIN — ATORVASTATIN CALCIUM 40 MG: 20 TABLET, FILM COATED ORAL at 09:11

## 2021-11-19 RX ADMIN — METOPROLOL TARTRATE 25 MG: 25 TABLET, FILM COATED ORAL at 09:11

## 2021-11-19 RX ADMIN — ENOXAPARIN SODIUM 40 MG: 40 INJECTION, SOLUTION INTRAVENOUS; SUBCUTANEOUS at 04:11

## 2021-11-19 RX ADMIN — SODIUM CHLORIDE: 0.9 INJECTION, SOLUTION INTRAVENOUS at 12:11

## 2021-11-20 ENCOUNTER — HOSPITAL ENCOUNTER (INPATIENT)
Facility: HOSPITAL | Age: 71
LOS: 9 days | Discharge: NURSING, REHAB OR LONG-TERM CARE | DRG: 872 | End: 2021-12-02
Attending: EMERGENCY MEDICINE | Admitting: HOSPITALIST
Payer: MEDICARE

## 2021-11-20 VITALS
WEIGHT: 166.69 LBS | HEART RATE: 97 BPM | TEMPERATURE: 100 F | DIASTOLIC BLOOD PRESSURE: 63 MMHG | HEIGHT: 66 IN | SYSTOLIC BLOOD PRESSURE: 135 MMHG | RESPIRATION RATE: 18 BRPM | BODY MASS INDEX: 26.79 KG/M2 | OXYGEN SATURATION: 100 %

## 2021-11-20 DIAGNOSIS — R50.9 FEVER: ICD-10-CM

## 2021-11-20 DIAGNOSIS — R53.1 FUNCTIONAL WEAKNESS: ICD-10-CM

## 2021-11-20 DIAGNOSIS — K83.8 DILATED BILE DUCT: ICD-10-CM

## 2021-11-20 DIAGNOSIS — N39.0 URINARY TRACT INFECTION WITHOUT HEMATURIA, SITE UNSPECIFIED: Primary | ICD-10-CM

## 2021-11-20 DIAGNOSIS — N17.9 AKI (ACUTE KIDNEY INJURY): ICD-10-CM

## 2021-11-20 LAB
ALBUMIN SERPL BCP-MCNC: 2.7 G/DL (ref 3.5–5.2)
ALBUMIN SERPL BCP-MCNC: 2.8 G/DL (ref 3.5–5.2)
ALBUMIN SERPL BCP-MCNC: 2.9 G/DL (ref 3.5–5.2)
ALP SERPL-CCNC: 246 U/L (ref 55–135)
ALT SERPL W/O P-5'-P-CCNC: 23 U/L (ref 10–44)
ANION GAP SERPL CALC-SCNC: 6 MMOL/L (ref 8–16)
ANION GAP SERPL CALC-SCNC: 7 MMOL/L (ref 8–16)
ANION GAP SERPL CALC-SCNC: 8 MMOL/L (ref 8–16)
ANION GAP SERPL CALC-SCNC: 9 MMOL/L (ref 8–16)
AST SERPL-CCNC: 18 U/L (ref 10–40)
BACTERIA #/AREA URNS AUTO: ABNORMAL /HPF
BASOPHILS # BLD AUTO: 0.03 K/UL (ref 0–0.2)
BASOPHILS NFR BLD: 0.3 % (ref 0–1.9)
BILIRUB SERPL-MCNC: 0.7 MG/DL (ref 0.1–1)
BILIRUB UR QL STRIP: NEGATIVE
BNP SERPL-MCNC: 62 PG/ML (ref 0–99)
BUN SERPL-MCNC: 32 MG/DL (ref 8–23)
BUN SERPL-MCNC: 41 MG/DL (ref 8–23)
BUN SERPL-MCNC: 42 MG/DL (ref 8–23)
BUN SERPL-MCNC: 42 MG/DL (ref 8–23)
CALCIUM SERPL-MCNC: 8.8 MG/DL (ref 8.7–10.5)
CALCIUM SERPL-MCNC: 8.8 MG/DL (ref 8.7–10.5)
CALCIUM SERPL-MCNC: 8.9 MG/DL (ref 8.7–10.5)
CALCIUM SERPL-MCNC: 9.5 MG/DL (ref 8.7–10.5)
CHLORIDE SERPL-SCNC: 108 MMOL/L (ref 95–110)
CHLORIDE SERPL-SCNC: 110 MMOL/L (ref 95–110)
CHLORIDE SERPL-SCNC: 110 MMOL/L (ref 95–110)
CHLORIDE SERPL-SCNC: 112 MMOL/L (ref 95–110)
CK SERPL-CCNC: 319 U/L (ref 20–180)
CLARITY UR REFRACT.AUTO: CLEAR
CO2 SERPL-SCNC: 23 MMOL/L (ref 23–29)
CO2 SERPL-SCNC: 23 MMOL/L (ref 23–29)
CO2 SERPL-SCNC: 24 MMOL/L (ref 23–29)
CO2 SERPL-SCNC: 25 MMOL/L (ref 23–29)
COLOR UR AUTO: YELLOW
CREAT SERPL-MCNC: 1.8 MG/DL (ref 0.5–1.4)
CREAT SERPL-MCNC: 2.2 MG/DL (ref 0.5–1.4)
CREAT SERPL-MCNC: 2.2 MG/DL (ref 0.5–1.4)
CREAT SERPL-MCNC: 2.3 MG/DL (ref 0.5–1.4)
CREAT UR-MCNC: 116 MG/DL (ref 15–325)
CTP QC/QA: YES
DIFFERENTIAL METHOD: ABNORMAL
EOSINOPHIL # BLD AUTO: 0.1 K/UL (ref 0–0.5)
EOSINOPHIL NFR BLD: 1.4 % (ref 0–8)
EOSINOPHIL URNS QL WRIGHT STN: NORMAL
ERYTHROCYTE [DISTWIDTH] IN BLOOD BY AUTOMATED COUNT: 14.3 % (ref 11.5–14.5)
EST. GFR  (AFRICAN AMERICAN): 24.3 ML/MIN/1.73 M^2
EST. GFR  (AFRICAN AMERICAN): 25.6 ML/MIN/1.73 M^2
EST. GFR  (AFRICAN AMERICAN): 25.6 ML/MIN/1.73 M^2
EST. GFR  (AFRICAN AMERICAN): 32.6 ML/MIN/1.73 M^2
EST. GFR  (NON AFRICAN AMERICAN): 21.1 ML/MIN/1.73 M^2
EST. GFR  (NON AFRICAN AMERICAN): 22.2 ML/MIN/1.73 M^2
EST. GFR  (NON AFRICAN AMERICAN): 22.2 ML/MIN/1.73 M^2
EST. GFR  (NON AFRICAN AMERICAN): 28.3 ML/MIN/1.73 M^2
GLUCOSE SERPL-MCNC: 109 MG/DL (ref 70–110)
GLUCOSE SERPL-MCNC: 113 MG/DL (ref 70–110)
GLUCOSE SERPL-MCNC: 122 MG/DL (ref 70–110)
GLUCOSE SERPL-MCNC: 127 MG/DL (ref 70–110)
GLUCOSE UR QL STRIP: NEGATIVE
HCT VFR BLD AUTO: 28.4 % (ref 37–48.5)
HGB BLD-MCNC: 8.5 G/DL (ref 12–16)
HGB UR QL STRIP: NEGATIVE
IMM GRANULOCYTES # BLD AUTO: 0.05 K/UL (ref 0–0.04)
IMM GRANULOCYTES NFR BLD AUTO: 0.5 % (ref 0–0.5)
KETONES UR QL STRIP: NEGATIVE
LACTATE SERPL-SCNC: 0.8 MMOL/L (ref 0.5–2.2)
LACTATE SERPL-SCNC: 0.9 MMOL/L (ref 0.5–2.2)
LEUKOCYTE ESTERASE UR QL STRIP: ABNORMAL
LYMPHOCYTES # BLD AUTO: 1 K/UL (ref 1–4.8)
LYMPHOCYTES NFR BLD: 10.7 % (ref 18–48)
MAGNESIUM SERPL-MCNC: 2.6 MG/DL (ref 1.6–2.6)
MCH RBC QN AUTO: 27.4 PG (ref 27–31)
MCHC RBC AUTO-ENTMCNC: 29.9 G/DL (ref 32–36)
MCV RBC AUTO: 92 FL (ref 82–98)
MICROSCOPIC COMMENT: ABNORMAL
MONOCYTES # BLD AUTO: 1.1 K/UL (ref 0.3–1)
MONOCYTES NFR BLD: 11.7 % (ref 4–15)
NEUTROPHILS # BLD AUTO: 7.2 K/UL (ref 1.8–7.7)
NEUTROPHILS NFR BLD: 75.4 % (ref 38–73)
NITRITE UR QL STRIP: POSITIVE
NON-SQ EPI CELLS #/AREA URNS AUTO: <1 /HPF
NRBC BLD-RTO: 0 /100 WBC
PH UR STRIP: 5 [PH] (ref 5–8)
PHOSPHATE SERPL-MCNC: 3.3 MG/DL (ref 2.7–4.5)
PLATELET # BLD AUTO: 193 K/UL (ref 150–450)
PMV BLD AUTO: 10.7 FL (ref 9.2–12.9)
POTASSIUM SERPL-SCNC: 4.2 MMOL/L (ref 3.5–5.1)
POTASSIUM SERPL-SCNC: 4.9 MMOL/L (ref 3.5–5.1)
POTASSIUM SERPL-SCNC: 5.3 MMOL/L (ref 3.5–5.1)
POTASSIUM SERPL-SCNC: 6.9 MMOL/L (ref 3.5–5.1)
PROCALCITONIN SERPL IA-MCNC: 0.48 NG/ML
PROT SERPL-MCNC: 7.2 G/DL (ref 6–8.4)
PROT UR QL STRIP: NEGATIVE
RBC # BLD AUTO: 3.1 M/UL (ref 4–5.4)
RBC #/AREA URNS AUTO: 2 /HPF (ref 0–4)
SARS-COV-2 RDRP RESP QL NAA+PROBE: NEGATIVE
SODIUM SERPL-SCNC: 140 MMOL/L (ref 136–145)
SODIUM SERPL-SCNC: 140 MMOL/L (ref 136–145)
SODIUM SERPL-SCNC: 142 MMOL/L (ref 136–145)
SODIUM SERPL-SCNC: 143 MMOL/L (ref 136–145)
SODIUM UR-SCNC: 52 MMOL/L (ref 20–250)
SP GR UR STRIP: 1.01 (ref 1–1.03)
SQUAMOUS #/AREA URNS AUTO: 4 /HPF
TROPONIN I SERPL DL<=0.01 NG/ML-MCNC: <0.006 NG/ML (ref 0–0.03)
URN SPEC COLLECT METH UR: ABNORMAL
UUN UR-MCNC: 498 MG/DL (ref 140–1050)
WBC # BLD AUTO: 9.6 K/UL (ref 3.9–12.7)
WBC #/AREA URNS AUTO: 18 /HPF (ref 0–5)

## 2021-11-20 PROCEDURE — 25000003 PHARM REV CODE 250: Mod: HCNC | Performed by: NURSE PRACTITIONER

## 2021-11-20 PROCEDURE — 93010 EKG 12-LEAD: ICD-10-PCS | Mod: HCNC,,, | Performed by: INTERNAL MEDICINE

## 2021-11-20 PROCEDURE — 84300 ASSAY OF URINE SODIUM: CPT | Mod: HCNC | Performed by: HOSPITALIST

## 2021-11-20 PROCEDURE — 84100 ASSAY OF PHOSPHORUS: CPT | Mod: HCNC | Performed by: PHYSICIAN ASSISTANT

## 2021-11-20 PROCEDURE — 87040 BLOOD CULTURE FOR BACTERIA: CPT | Mod: HCNC | Performed by: PHYSICIAN ASSISTANT

## 2021-11-20 PROCEDURE — 84484 ASSAY OF TROPONIN QUANT: CPT | Mod: HCNC | Performed by: PHYSICIAN ASSISTANT

## 2021-11-20 PROCEDURE — 25000003 PHARM REV CODE 250: Mod: HCNC | Performed by: HOSPITALIST

## 2021-11-20 PROCEDURE — G0378 HOSPITAL OBSERVATION PER HR: HCPCS | Mod: HCNC

## 2021-11-20 PROCEDURE — 83605 ASSAY OF LACTIC ACID: CPT | Mod: 91,HCNC | Performed by: PHYSICIAN ASSISTANT

## 2021-11-20 PROCEDURE — 85025 COMPLETE CBC W/AUTO DIFF WBC: CPT | Mod: HCNC | Performed by: PHYSICIAN ASSISTANT

## 2021-11-20 PROCEDURE — 80069 RENAL FUNCTION PANEL: CPT | Mod: 91,HCNC | Performed by: HOSPITALIST

## 2021-11-20 PROCEDURE — 25000242 PHARM REV CODE 250 ALT 637 W/ HCPCS: Mod: HCNC | Performed by: HOSPITALIST

## 2021-11-20 PROCEDURE — 82550 ASSAY OF CK (CPK): CPT | Mod: HCNC | Performed by: HOSPITALIST

## 2021-11-20 PROCEDURE — 83880 ASSAY OF NATRIURETIC PEPTIDE: CPT | Mod: HCNC | Performed by: PHYSICIAN ASSISTANT

## 2021-11-20 PROCEDURE — 36415 COLL VENOUS BLD VENIPUNCTURE: CPT | Mod: HCNC | Performed by: HOSPITALIST

## 2021-11-20 PROCEDURE — 96361 HYDRATE IV INFUSION ADD-ON: CPT | Mod: HCNC

## 2021-11-20 PROCEDURE — 84145 PROCALCITONIN (PCT): CPT | Mod: HCNC | Performed by: PHYSICIAN ASSISTANT

## 2021-11-20 PROCEDURE — 18500000 HC LEAVE OF ABSENCE HOSPITAL SERVICES: Mod: HCNC

## 2021-11-20 PROCEDURE — 84540 ASSAY OF URINE/UREA-N: CPT | Mod: HCNC | Performed by: HOSPITALIST

## 2021-11-20 PROCEDURE — 94640 AIRWAY INHALATION TREATMENT: CPT | Mod: HCNC

## 2021-11-20 PROCEDURE — 93005 ELECTROCARDIOGRAM TRACING: CPT | Mod: HCNC

## 2021-11-20 PROCEDURE — 87077 CULTURE AEROBIC IDENTIFY: CPT | Mod: HCNC | Performed by: PHYSICIAN ASSISTANT

## 2021-11-20 PROCEDURE — 99285 EMERGENCY DEPT VISIT HI MDM: CPT | Mod: 25,HCNC

## 2021-11-20 PROCEDURE — U0002 COVID-19 LAB TEST NON-CDC: HCPCS | Mod: HCNC | Performed by: PHYSICIAN ASSISTANT

## 2021-11-20 PROCEDURE — 80048 BASIC METABOLIC PNL TOTAL CA: CPT | Mod: HCNC | Performed by: NURSE PRACTITIONER

## 2021-11-20 PROCEDURE — 63600175 PHARM REV CODE 636 W HCPCS: Mod: HCNC | Performed by: PHYSICIAN ASSISTANT

## 2021-11-20 PROCEDURE — 87205 SMEAR GRAM STAIN: CPT | Mod: HCNC | Performed by: HOSPITALIST

## 2021-11-20 PROCEDURE — 96365 THER/PROPH/DIAG IV INF INIT: CPT | Mod: HCNC

## 2021-11-20 PROCEDURE — 83735 ASSAY OF MAGNESIUM: CPT | Mod: HCNC | Performed by: PHYSICIAN ASSISTANT

## 2021-11-20 PROCEDURE — 87186 SC STD MICRODIL/AGAR DIL: CPT | Mod: HCNC | Performed by: PHYSICIAN ASSISTANT

## 2021-11-20 PROCEDURE — 82570 ASSAY OF URINE CREATININE: CPT | Mod: HCNC | Performed by: HOSPITALIST

## 2021-11-20 PROCEDURE — 81001 URINALYSIS AUTO W/SCOPE: CPT | Mod: HCNC | Performed by: PHYSICIAN ASSISTANT

## 2021-11-20 PROCEDURE — 99285 EMERGENCY DEPT VISIT HI MDM: CPT | Mod: HCNC,CS,, | Performed by: PHYSICIAN ASSISTANT

## 2021-11-20 PROCEDURE — 87088 URINE BACTERIA CULTURE: CPT | Mod: HCNC | Performed by: PHYSICIAN ASSISTANT

## 2021-11-20 PROCEDURE — 80053 COMPREHEN METABOLIC PANEL: CPT | Mod: HCNC | Performed by: PHYSICIAN ASSISTANT

## 2021-11-20 PROCEDURE — 99219 PR INITIAL OBSERVATION CARE,LEVL II: CPT | Mod: HCNC,,, | Performed by: HOSPITALIST

## 2021-11-20 PROCEDURE — 63600175 PHARM REV CODE 636 W HCPCS: Mod: HCNC | Performed by: HOSPITALIST

## 2021-11-20 PROCEDURE — 25000003 PHARM REV CODE 250: Mod: HCNC | Performed by: PHYSICIAN ASSISTANT

## 2021-11-20 PROCEDURE — 80047 BASIC METABLC PNL IONIZED CA: CPT | Mod: HCNC

## 2021-11-20 PROCEDURE — 99285 PR EMERGENCY DEPT VISIT,LEVEL V: ICD-10-PCS | Mod: HCNC,CS,, | Performed by: PHYSICIAN ASSISTANT

## 2021-11-20 PROCEDURE — 87086 URINE CULTURE/COLONY COUNT: CPT | Mod: HCNC | Performed by: PHYSICIAN ASSISTANT

## 2021-11-20 PROCEDURE — 99219 PR INITIAL OBSERVATION CARE,LEVL II: ICD-10-PCS | Mod: HCNC,,, | Performed by: HOSPITALIST

## 2021-11-20 PROCEDURE — 93010 ELECTROCARDIOGRAM REPORT: CPT | Mod: HCNC,,, | Performed by: INTERNAL MEDICINE

## 2021-11-20 PROCEDURE — 94761 N-INVAS EAR/PLS OXIMETRY MLT: CPT | Mod: HCNC

## 2021-11-20 RX ORDER — LANOLIN ALCOHOL/MO/W.PET/CERES
1000 CREAM (GRAM) TOPICAL DAILY
Status: DISCONTINUED | OUTPATIENT
Start: 2021-11-21 | End: 2021-12-02 | Stop reason: HOSPADM

## 2021-11-20 RX ORDER — ALBUTEROL SULFATE 5 MG/ML
10 SOLUTION RESPIRATORY (INHALATION) ONCE
Status: COMPLETED | OUTPATIENT
Start: 2021-11-20 | End: 2021-11-20

## 2021-11-20 RX ORDER — ENOXAPARIN SODIUM 100 MG/ML
30 INJECTION SUBCUTANEOUS EVERY 24 HOURS
Status: DISCONTINUED | OUTPATIENT
Start: 2021-11-20 | End: 2021-11-21 | Stop reason: HOSPADM

## 2021-11-20 RX ORDER — IBUPROFEN 200 MG
24 TABLET ORAL
Status: DISCONTINUED | OUTPATIENT
Start: 2021-11-20 | End: 2021-12-02 | Stop reason: HOSPADM

## 2021-11-20 RX ORDER — ATORVASTATIN CALCIUM 20 MG/1
40 TABLET, FILM COATED ORAL NIGHTLY
Status: DISCONTINUED | OUTPATIENT
Start: 2021-11-20 | End: 2021-12-02 | Stop reason: HOSPADM

## 2021-11-20 RX ORDER — AMLODIPINE BESYLATE 10 MG/1
10 TABLET ORAL DAILY
Status: DISCONTINUED | OUTPATIENT
Start: 2021-11-21 | End: 2021-11-26

## 2021-11-20 RX ORDER — GLUCAGON 1 MG
1 KIT INJECTION
Status: DISCONTINUED | OUTPATIENT
Start: 2021-11-20 | End: 2021-12-02 | Stop reason: HOSPADM

## 2021-11-20 RX ORDER — METOPROLOL TARTRATE 25 MG/1
25 TABLET, FILM COATED ORAL 2 TIMES DAILY
Status: DISCONTINUED | OUTPATIENT
Start: 2021-11-20 | End: 2021-12-02 | Stop reason: HOSPADM

## 2021-11-20 RX ORDER — NALOXONE HCL 0.4 MG/ML
0.02 VIAL (ML) INJECTION
Status: DISCONTINUED | OUTPATIENT
Start: 2021-11-20 | End: 2021-12-02 | Stop reason: HOSPADM

## 2021-11-20 RX ORDER — IBUPROFEN 200 MG
16 TABLET ORAL
Status: DISCONTINUED | OUTPATIENT
Start: 2021-11-20 | End: 2021-12-02 | Stop reason: HOSPADM

## 2021-11-20 RX ORDER — SODIUM CHLORIDE 0.9 % (FLUSH) 0.9 %
10 SYRINGE (ML) INJECTION EVERY 12 HOURS PRN
Status: DISCONTINUED | OUTPATIENT
Start: 2021-11-20 | End: 2021-12-02 | Stop reason: HOSPADM

## 2021-11-20 RX ADMIN — SODIUM ZIRCONIUM CYCLOSILICATE 10 G: 5 POWDER, FOR SUSPENSION ORAL at 06:11

## 2021-11-20 RX ADMIN — METOPROLOL TARTRATE 25 MG: 25 TABLET, FILM COATED ORAL at 10:11

## 2021-11-20 RX ADMIN — OXYCODONE HYDROCHLORIDE 15 MG: 10 TABLET ORAL at 05:11

## 2021-11-20 RX ADMIN — Medication 1 CAPSULE: at 09:11

## 2021-11-20 RX ADMIN — PIPERACILLIN AND TAZOBACTAM 4.5 G: 4; .5 INJECTION, POWDER, LYOPHILIZED, FOR SOLUTION INTRAVENOUS; PARENTERAL at 01:11

## 2021-11-20 RX ADMIN — OXYCODONE HYDROCHLORIDE 15 MG: 10 TABLET ORAL at 12:11

## 2021-11-20 RX ADMIN — SODIUM CHLORIDE, SODIUM LACTATE, POTASSIUM CHLORIDE, AND CALCIUM CHLORIDE 1000 ML: .6; .31; .03; .02 INJECTION, SOLUTION INTRAVENOUS at 11:11

## 2021-11-20 RX ADMIN — PIPERACILLIN SODIUM AND TAZOBACTAM SODIUM 4.5 G: 4; .5 INJECTION, POWDER, FOR SOLUTION INTRAVENOUS at 10:11

## 2021-11-20 RX ADMIN — METOPROLOL TARTRATE 25 MG: 25 TABLET, FILM COATED ORAL at 09:11

## 2021-11-20 RX ADMIN — HYDRALAZINE HYDROCHLORIDE 25 MG: 25 TABLET, FILM COATED ORAL at 06:11

## 2021-11-20 RX ADMIN — OXYCODONE HYDROCHLORIDE 15 MG: 10 TABLET ORAL at 09:11

## 2021-11-20 RX ADMIN — ATORVASTATIN CALCIUM 40 MG: 20 TABLET, FILM COATED ORAL at 10:11

## 2021-11-20 RX ADMIN — ALBUTEROL SULFATE 10 MG: 5 SOLUTION RESPIRATORY (INHALATION) at 05:11

## 2021-11-21 LAB
ALBUMIN SERPL BCP-MCNC: 2.4 G/DL (ref 3.5–5.2)
ALBUMIN SERPL BCP-MCNC: 2.6 G/DL (ref 3.5–5.2)
ANION GAP SERPL CALC-SCNC: 10 MMOL/L (ref 8–16)
ANION GAP SERPL CALC-SCNC: 7 MMOL/L (ref 8–16)
BASOPHILS # BLD AUTO: 0.02 K/UL (ref 0–0.2)
BASOPHILS NFR BLD: 0.2 % (ref 0–1.9)
BUN SERPL-MCNC: 26 MG/DL (ref 8–23)
BUN SERPL-MCNC: 30 MG/DL (ref 8–23)
CALCIUM SERPL-MCNC: 8.3 MG/DL (ref 8.7–10.5)
CALCIUM SERPL-MCNC: 8.7 MG/DL (ref 8.7–10.5)
CHLORIDE SERPL-SCNC: 110 MMOL/L (ref 95–110)
CHLORIDE SERPL-SCNC: 111 MMOL/L (ref 95–110)
CO2 SERPL-SCNC: 24 MMOL/L (ref 23–29)
CO2 SERPL-SCNC: 26 MMOL/L (ref 23–29)
CREAT SERPL-MCNC: 1.5 MG/DL (ref 0.5–1.4)
CREAT SERPL-MCNC: 1.5 MG/DL (ref 0.5–1.4)
DIFFERENTIAL METHOD: ABNORMAL
EOSINOPHIL # BLD AUTO: 0.3 K/UL (ref 0–0.5)
EOSINOPHIL NFR BLD: 3 % (ref 0–8)
ERYTHROCYTE [DISTWIDTH] IN BLOOD BY AUTOMATED COUNT: 14.2 % (ref 11.5–14.5)
EST. GFR  (AFRICAN AMERICAN): 40.7 ML/MIN/1.73 M^2
EST. GFR  (AFRICAN AMERICAN): 40.7 ML/MIN/1.73 M^2
EST. GFR  (NON AFRICAN AMERICAN): 35.3 ML/MIN/1.73 M^2
EST. GFR  (NON AFRICAN AMERICAN): 35.3 ML/MIN/1.73 M^2
GLUCOSE SERPL-MCNC: 126 MG/DL (ref 70–110)
GLUCOSE SERPL-MCNC: 127 MG/DL (ref 70–110)
HCT VFR BLD AUTO: 27 % (ref 37–48.5)
HGB BLD-MCNC: 8 G/DL (ref 12–16)
IMM GRANULOCYTES # BLD AUTO: 0.03 K/UL (ref 0–0.04)
IMM GRANULOCYTES NFR BLD AUTO: 0.4 % (ref 0–0.5)
LYMPHOCYTES # BLD AUTO: 0.8 K/UL (ref 1–4.8)
LYMPHOCYTES NFR BLD: 9.8 % (ref 18–48)
MCH RBC QN AUTO: 27.1 PG (ref 27–31)
MCHC RBC AUTO-ENTMCNC: 29.6 G/DL (ref 32–36)
MCV RBC AUTO: 92 FL (ref 82–98)
MONOCYTES # BLD AUTO: 0.9 K/UL (ref 0.3–1)
MONOCYTES NFR BLD: 10.7 % (ref 4–15)
NEUTROPHILS # BLD AUTO: 6.4 K/UL (ref 1.8–7.7)
NEUTROPHILS NFR BLD: 75.9 % (ref 38–73)
NRBC BLD-RTO: 0 /100 WBC
PHOSPHATE SERPL-MCNC: 3.3 MG/DL (ref 2.7–4.5)
PHOSPHATE SERPL-MCNC: 3.5 MG/DL (ref 2.7–4.5)
PLATELET # BLD AUTO: 210 K/UL (ref 150–450)
PMV BLD AUTO: 11.3 FL (ref 9.2–12.9)
POCT GLUCOSE: 113 MG/DL (ref 70–110)
POCT GLUCOSE: 127 MG/DL (ref 70–110)
POCT GLUCOSE: 207 MG/DL (ref 70–110)
POCT GLUCOSE: 243 MG/DL (ref 70–110)
POTASSIUM SERPL-SCNC: 4.4 MMOL/L (ref 3.5–5.1)
POTASSIUM SERPL-SCNC: 4.4 MMOL/L (ref 3.5–5.1)
RBC # BLD AUTO: 2.95 M/UL (ref 4–5.4)
SODIUM SERPL-SCNC: 143 MMOL/L (ref 136–145)
SODIUM SERPL-SCNC: 145 MMOL/L (ref 136–145)
WBC # BLD AUTO: 8.44 K/UL (ref 3.9–12.7)

## 2021-11-21 PROCEDURE — 80069 RENAL FUNCTION PANEL: CPT | Mod: HCNC | Performed by: HOSPITALIST

## 2021-11-21 PROCEDURE — 99226 PR SUBSEQUENT OBSERVATION CARE,LEVEL III: CPT | Mod: HCNC,,, | Performed by: HOSPITALIST

## 2021-11-21 PROCEDURE — 63600175 PHARM REV CODE 636 W HCPCS: Mod: HCNC | Performed by: HOSPITALIST

## 2021-11-21 PROCEDURE — 99214 OFFICE O/P EST MOD 30 MIN: CPT | Mod: HCNC,,, | Performed by: PHYSICIAN ASSISTANT

## 2021-11-21 PROCEDURE — G0378 HOSPITAL OBSERVATION PER HR: HCPCS | Mod: HCNC

## 2021-11-21 PROCEDURE — 25000003 PHARM REV CODE 250: Mod: HCNC | Performed by: HOSPITALIST

## 2021-11-21 PROCEDURE — 94761 N-INVAS EAR/PLS OXIMETRY MLT: CPT | Mod: HCNC

## 2021-11-21 PROCEDURE — 99226 PR SUBSEQUENT OBSERVATION CARE,LEVEL III: ICD-10-PCS | Mod: HCNC,,, | Performed by: HOSPITALIST

## 2021-11-21 PROCEDURE — 99214 PR OFFICE/OUTPT VISIT, EST, LEVL IV, 30-39 MIN: ICD-10-PCS | Mod: HCNC,,, | Performed by: PHYSICIAN ASSISTANT

## 2021-11-21 PROCEDURE — 80069 RENAL FUNCTION PANEL: CPT | Mod: 91,HCNC | Performed by: HOSPITALIST

## 2021-11-21 PROCEDURE — 36415 COLL VENOUS BLD VENIPUNCTURE: CPT | Mod: HCNC | Performed by: HOSPITALIST

## 2021-11-21 PROCEDURE — 85025 COMPLETE CBC W/AUTO DIFF WBC: CPT | Mod: HCNC | Performed by: HOSPITALIST

## 2021-11-21 RX ORDER — SODIUM CHLORIDE 9 MG/ML
INJECTION, SOLUTION INTRAVENOUS CONTINUOUS
Status: ACTIVE | OUTPATIENT
Start: 2021-11-21 | End: 2021-11-21

## 2021-11-21 RX ADMIN — METOPROLOL TARTRATE 25 MG: 25 TABLET, FILM COATED ORAL at 09:11

## 2021-11-21 RX ADMIN — PIPERACILLIN SODIUM AND TAZOBACTAM SODIUM 4.5 G: 4; .5 INJECTION, POWDER, FOR SOLUTION INTRAVENOUS at 04:11

## 2021-11-21 RX ADMIN — SODIUM CHLORIDE: 0.9 INJECTION, SOLUTION INTRAVENOUS at 08:11

## 2021-11-21 RX ADMIN — AMLODIPINE BESYLATE 10 MG: 10 TABLET ORAL at 08:11

## 2021-11-21 RX ADMIN — PIPERACILLIN SODIUM AND TAZOBACTAM SODIUM 4.5 G: 4; .5 INJECTION, POWDER, FOR SOLUTION INTRAVENOUS at 12:11

## 2021-11-21 RX ADMIN — OXYCODONE HYDROCHLORIDE 15 MG: 10 TABLET ORAL at 09:11

## 2021-11-21 RX ADMIN — CYANOCOBALAMIN TAB 1000 MCG 1000 MCG: 1000 TAB at 08:11

## 2021-11-21 RX ADMIN — OXYCODONE HYDROCHLORIDE 15 MG: 10 TABLET ORAL at 02:11

## 2021-11-21 RX ADMIN — METOPROLOL TARTRATE 25 MG: 25 TABLET, FILM COATED ORAL at 08:11

## 2021-11-21 RX ADMIN — ATORVASTATIN CALCIUM 40 MG: 20 TABLET, FILM COATED ORAL at 09:11

## 2021-11-21 RX ADMIN — PIPERACILLIN SODIUM AND TAZOBACTAM SODIUM 4.5 G: 4; .5 INJECTION, POWDER, FOR SOLUTION INTRAVENOUS at 11:11

## 2021-11-21 RX ADMIN — OXYCODONE HYDROCHLORIDE 15 MG: 10 TABLET ORAL at 10:11

## 2021-11-22 ENCOUNTER — ANESTHESIA EVENT (OUTPATIENT)
Dept: ENDOSCOPY | Facility: HOSPITAL | Age: 71
DRG: 872 | End: 2021-11-22
Payer: MEDICARE

## 2021-11-22 LAB
ALBUMIN SERPL BCP-MCNC: 2.4 G/DL (ref 3.5–5.2)
ALBUMIN SERPL BCP-MCNC: 2.4 G/DL (ref 3.5–5.2)
ALP SERPL-CCNC: 200 U/L (ref 55–135)
ALT SERPL W/O P-5'-P-CCNC: 16 U/L (ref 10–44)
ANION GAP SERPL CALC-SCNC: 10 MMOL/L (ref 8–16)
ANION GAP SERPL CALC-SCNC: 10 MMOL/L (ref 8–16)
AST SERPL-CCNC: 15 U/L (ref 10–40)
BASOPHILS # BLD AUTO: 0.04 K/UL (ref 0–0.2)
BASOPHILS NFR BLD: 0.6 % (ref 0–1.9)
BILIRUB SERPL-MCNC: 0.6 MG/DL (ref 0.1–1)
BUN SERPL-MCNC: 22 MG/DL (ref 8–23)
BUN SERPL-MCNC: 22 MG/DL (ref 8–23)
BUN SERPL-MCNC: 39 MG/DL (ref 6–30)
CALCIUM SERPL-MCNC: 9.1 MG/DL (ref 8.7–10.5)
CALCIUM SERPL-MCNC: 9.1 MG/DL (ref 8.7–10.5)
CHLORIDE SERPL-SCNC: 107 MMOL/L (ref 95–110)
CHLORIDE SERPL-SCNC: 112 MMOL/L (ref 95–110)
CHLORIDE SERPL-SCNC: 112 MMOL/L (ref 95–110)
CO2 SERPL-SCNC: 23 MMOL/L (ref 23–29)
CO2 SERPL-SCNC: 23 MMOL/L (ref 23–29)
CREAT SERPL-MCNC: 1.5 MG/DL (ref 0.5–1.4)
CREAT SERPL-MCNC: 1.5 MG/DL (ref 0.5–1.4)
CREAT SERPL-MCNC: 2.2 MG/DL (ref 0.5–1.4)
DIFFERENTIAL METHOD: ABNORMAL
EOSINOPHIL # BLD AUTO: 0.2 K/UL (ref 0–0.5)
EOSINOPHIL NFR BLD: 3.6 % (ref 0–8)
ERYTHROCYTE [DISTWIDTH] IN BLOOD BY AUTOMATED COUNT: 14.1 % (ref 11.5–14.5)
EST. GFR  (AFRICAN AMERICAN): 40.7 ML/MIN/1.73 M^2
EST. GFR  (AFRICAN AMERICAN): 40.7 ML/MIN/1.73 M^2
EST. GFR  (NON AFRICAN AMERICAN): 35.3 ML/MIN/1.73 M^2
EST. GFR  (NON AFRICAN AMERICAN): 35.3 ML/MIN/1.73 M^2
GLUCOSE SERPL-MCNC: 121 MG/DL (ref 70–110)
GLUCOSE SERPL-MCNC: 121 MG/DL (ref 70–110)
GLUCOSE SERPL-MCNC: 130 MG/DL (ref 70–110)
HCT VFR BLD AUTO: 26.9 % (ref 37–48.5)
HCT VFR BLD CALC: 29 %PCV (ref 36–54)
HGB BLD-MCNC: 7.7 G/DL (ref 12–16)
IMM GRANULOCYTES # BLD AUTO: 0.02 K/UL (ref 0–0.04)
IMM GRANULOCYTES NFR BLD AUTO: 0.3 % (ref 0–0.5)
LYMPHOCYTES # BLD AUTO: 1.2 K/UL (ref 1–4.8)
LYMPHOCYTES NFR BLD: 17.3 % (ref 18–48)
MAGNESIUM SERPL-MCNC: 2 MG/DL (ref 1.6–2.6)
MCH RBC QN AUTO: 26.9 PG (ref 27–31)
MCHC RBC AUTO-ENTMCNC: 28.6 G/DL (ref 32–36)
MCV RBC AUTO: 94 FL (ref 82–98)
MONOCYTES # BLD AUTO: 0.7 K/UL (ref 0.3–1)
MONOCYTES NFR BLD: 10.4 % (ref 4–15)
NEUTROPHILS # BLD AUTO: 4.6 K/UL (ref 1.8–7.7)
NEUTROPHILS NFR BLD: 67.8 % (ref 38–73)
NRBC BLD-RTO: 0 /100 WBC
PHOSPHATE SERPL-MCNC: 2.6 MG/DL (ref 2.7–4.5)
PHOSPHATE SERPL-MCNC: 2.6 MG/DL (ref 2.7–4.5)
PLATELET # BLD AUTO: 203 K/UL (ref 150–450)
PMV BLD AUTO: 11 FL (ref 9.2–12.9)
POC IONIZED CALCIUM: 1.26 MMOL/L (ref 1.06–1.42)
POC TCO2 (MEASURED): 25 MMOL/L (ref 23–29)
POCT GLUCOSE: 161 MG/DL (ref 70–110)
POCT GLUCOSE: 174 MG/DL (ref 70–110)
POTASSIUM BLD-SCNC: 4.9 MMOL/L (ref 3.5–5.1)
POTASSIUM SERPL-SCNC: 3.9 MMOL/L (ref 3.5–5.1)
POTASSIUM SERPL-SCNC: 3.9 MMOL/L (ref 3.5–5.1)
PROT SERPL-MCNC: 6.5 G/DL (ref 6–8.4)
RBC # BLD AUTO: 2.86 M/UL (ref 4–5.4)
SAMPLE: ABNORMAL
SODIUM BLD-SCNC: 142 MMOL/L (ref 136–145)
SODIUM SERPL-SCNC: 145 MMOL/L (ref 136–145)
SODIUM SERPL-SCNC: 145 MMOL/L (ref 136–145)
WBC # BLD AUTO: 6.72 K/UL (ref 3.9–12.7)

## 2021-11-22 PROCEDURE — G0378 HOSPITAL OBSERVATION PER HR: HCPCS | Mod: HCNC

## 2021-11-22 PROCEDURE — 80053 COMPREHEN METABOLIC PANEL: CPT | Mod: HCNC | Performed by: HOSPITALIST

## 2021-11-22 PROCEDURE — 63600175 PHARM REV CODE 636 W HCPCS: Mod: HCNC | Performed by: HOSPITALIST

## 2021-11-22 PROCEDURE — 94760 N-INVAS EAR/PLS OXIMETRY 1: CPT | Mod: HCNC

## 2021-11-22 PROCEDURE — 99214 OFFICE O/P EST MOD 30 MIN: CPT | Mod: HCNC,,, | Performed by: PHYSICIAN ASSISTANT

## 2021-11-22 PROCEDURE — 25000003 PHARM REV CODE 250: Mod: HCNC | Performed by: HOSPITALIST

## 2021-11-22 PROCEDURE — 99214 PR OFFICE/OUTPT VISIT, EST, LEVL IV, 30-39 MIN: ICD-10-PCS | Mod: HCNC,,, | Performed by: PHYSICIAN ASSISTANT

## 2021-11-22 PROCEDURE — 94761 N-INVAS EAR/PLS OXIMETRY MLT: CPT | Mod: HCNC

## 2021-11-22 PROCEDURE — 99225 PR SUBSEQUENT OBSERVATION CARE,LEVEL II: ICD-10-PCS | Mod: HCNC,,, | Performed by: HOSPITALIST

## 2021-11-22 PROCEDURE — 85025 COMPLETE CBC W/AUTO DIFF WBC: CPT | Mod: HCNC | Performed by: HOSPITALIST

## 2021-11-22 PROCEDURE — 36415 COLL VENOUS BLD VENIPUNCTURE: CPT | Mod: HCNC | Performed by: HOSPITALIST

## 2021-11-22 PROCEDURE — 99225 PR SUBSEQUENT OBSERVATION CARE,LEVEL II: CPT | Mod: HCNC,,, | Performed by: HOSPITALIST

## 2021-11-22 PROCEDURE — 83735 ASSAY OF MAGNESIUM: CPT | Mod: HCNC | Performed by: HOSPITALIST

## 2021-11-22 PROCEDURE — 80069 RENAL FUNCTION PANEL: CPT | Mod: HCNC | Performed by: HOSPITALIST

## 2021-11-22 RX ADMIN — PIPERACILLIN SODIUM AND TAZOBACTAM SODIUM 4.5 G: 4; .5 INJECTION, POWDER, FOR SOLUTION INTRAVENOUS at 08:11

## 2021-11-22 RX ADMIN — PIPERACILLIN SODIUM AND TAZOBACTAM SODIUM 4.5 G: 4; .5 INJECTION, POWDER, FOR SOLUTION INTRAVENOUS at 12:11

## 2021-11-22 RX ADMIN — PIPERACILLIN SODIUM AND TAZOBACTAM SODIUM 4.5 G: 4; .5 INJECTION, POWDER, FOR SOLUTION INTRAVENOUS at 06:11

## 2021-11-22 RX ADMIN — METOPROLOL TARTRATE 25 MG: 25 TABLET, FILM COATED ORAL at 09:11

## 2021-11-22 RX ADMIN — OXYCODONE HYDROCHLORIDE 15 MG: 10 TABLET ORAL at 11:11

## 2021-11-22 RX ADMIN — CYANOCOBALAMIN TAB 1000 MCG 1000 MCG: 1000 TAB at 09:11

## 2021-11-22 RX ADMIN — ATORVASTATIN CALCIUM 40 MG: 20 TABLET, FILM COATED ORAL at 08:11

## 2021-11-22 RX ADMIN — METOPROLOL TARTRATE 25 MG: 25 TABLET, FILM COATED ORAL at 08:11

## 2021-11-22 RX ADMIN — OXYCODONE HYDROCHLORIDE 15 MG: 10 TABLET ORAL at 08:11

## 2021-11-22 RX ADMIN — AMLODIPINE BESYLATE 10 MG: 10 TABLET ORAL at 09:11

## 2021-11-22 RX ADMIN — OXYCODONE HYDROCHLORIDE 15 MG: 10 TABLET ORAL at 04:11

## 2021-11-23 ENCOUNTER — ANESTHESIA (OUTPATIENT)
Dept: ENDOSCOPY | Facility: HOSPITAL | Age: 71
DRG: 872 | End: 2021-11-23
Payer: MEDICARE

## 2021-11-23 LAB
ABO + RH BLD: NORMAL
ALBUMIN SERPL BCP-MCNC: 2.1 G/DL (ref 3.5–5.2)
ALBUMIN SERPL BCP-MCNC: 2.1 G/DL (ref 3.5–5.2)
ALBUMIN SERPL BCP-MCNC: 2.6 G/DL (ref 3.5–5.2)
ALP SERPL-CCNC: 203 U/L (ref 55–135)
ALT SERPL W/O P-5'-P-CCNC: 13 U/L (ref 10–44)
ANION GAP SERPL CALC-SCNC: 12 MMOL/L (ref 8–16)
ANION GAP SERPL CALC-SCNC: 15 MMOL/L (ref 8–16)
ANION GAP SERPL CALC-SCNC: 15 MMOL/L (ref 8–16)
AST SERPL-CCNC: 15 U/L (ref 10–40)
BASOPHILS # BLD AUTO: 0.03 K/UL (ref 0–0.2)
BASOPHILS # BLD AUTO: 0.04 K/UL (ref 0–0.2)
BASOPHILS NFR BLD: 0.5 % (ref 0–1.9)
BASOPHILS NFR BLD: 0.7 % (ref 0–1.9)
BILIRUB SERPL-MCNC: 0.5 MG/DL (ref 0.1–1)
BLD GP AB SCN CELLS X3 SERPL QL: NORMAL
BUN SERPL-MCNC: 12 MG/DL (ref 8–23)
BUN SERPL-MCNC: 13 MG/DL (ref 8–23)
BUN SERPL-MCNC: 13 MG/DL (ref 8–23)
CALCIUM SERPL-MCNC: 7.6 MG/DL (ref 8.7–10.5)
CALCIUM SERPL-MCNC: 7.6 MG/DL (ref 8.7–10.5)
CALCIUM SERPL-MCNC: 9.9 MG/DL (ref 8.7–10.5)
CHLORIDE SERPL-SCNC: 109 MMOL/L (ref 95–110)
CHLORIDE SERPL-SCNC: 112 MMOL/L (ref 95–110)
CHLORIDE SERPL-SCNC: 112 MMOL/L (ref 95–110)
CO2 SERPL-SCNC: 23 MMOL/L (ref 23–29)
CREAT SERPL-MCNC: 1 MG/DL (ref 0.5–1.4)
CREAT SERPL-MCNC: 1 MG/DL (ref 0.5–1.4)
CREAT SERPL-MCNC: 1.1 MG/DL (ref 0.5–1.4)
DIFFERENTIAL METHOD: ABNORMAL
DIFFERENTIAL METHOD: ABNORMAL
EOSINOPHIL # BLD AUTO: 0.2 K/UL (ref 0–0.5)
EOSINOPHIL # BLD AUTO: 0.2 K/UL (ref 0–0.5)
EOSINOPHIL NFR BLD: 3.6 % (ref 0–8)
EOSINOPHIL NFR BLD: 3.7 % (ref 0–8)
ERYTHROCYTE [DISTWIDTH] IN BLOOD BY AUTOMATED COUNT: 13.8 % (ref 11.5–14.5)
ERYTHROCYTE [DISTWIDTH] IN BLOOD BY AUTOMATED COUNT: 14 % (ref 11.5–14.5)
EST. GFR  (AFRICAN AMERICAN): 59.2 ML/MIN/1.73 M^2
EST. GFR  (AFRICAN AMERICAN): >60 ML/MIN/1.73 M^2
EST. GFR  (AFRICAN AMERICAN): >60 ML/MIN/1.73 M^2
EST. GFR  (NON AFRICAN AMERICAN): 51.3 ML/MIN/1.73 M^2
EST. GFR  (NON AFRICAN AMERICAN): 57.6 ML/MIN/1.73 M^2
EST. GFR  (NON AFRICAN AMERICAN): 57.6 ML/MIN/1.73 M^2
GLUCOSE SERPL-MCNC: 80 MG/DL (ref 70–110)
GLUCOSE SERPL-MCNC: 87 MG/DL (ref 70–110)
GLUCOSE SERPL-MCNC: 87 MG/DL (ref 70–110)
HCT VFR BLD AUTO: 24.3 % (ref 37–48.5)
HCT VFR BLD AUTO: 31.4 % (ref 37–48.5)
HGB BLD-MCNC: 7.1 G/DL (ref 12–16)
HGB BLD-MCNC: 9.1 G/DL (ref 12–16)
IMM GRANULOCYTES # BLD AUTO: 0.02 K/UL (ref 0–0.04)
IMM GRANULOCYTES # BLD AUTO: 0.04 K/UL (ref 0–0.04)
IMM GRANULOCYTES NFR BLD AUTO: 0.3 % (ref 0–0.5)
IMM GRANULOCYTES NFR BLD AUTO: 0.7 % (ref 0–0.5)
LYMPHOCYTES # BLD AUTO: 1 K/UL (ref 1–4.8)
LYMPHOCYTES # BLD AUTO: 1.2 K/UL (ref 1–4.8)
LYMPHOCYTES NFR BLD: 17.5 % (ref 18–48)
LYMPHOCYTES NFR BLD: 20.3 % (ref 18–48)
MAGNESIUM SERPL-MCNC: 1.6 MG/DL (ref 1.6–2.6)
MCH RBC QN AUTO: 26.5 PG (ref 27–31)
MCH RBC QN AUTO: 26.7 PG (ref 27–31)
MCHC RBC AUTO-ENTMCNC: 29 G/DL (ref 32–36)
MCHC RBC AUTO-ENTMCNC: 29.2 G/DL (ref 32–36)
MCV RBC AUTO: 91 FL (ref 82–98)
MCV RBC AUTO: 92 FL (ref 82–98)
MONOCYTES # BLD AUTO: 0.5 K/UL (ref 0.3–1)
MONOCYTES # BLD AUTO: 0.6 K/UL (ref 0.3–1)
MONOCYTES NFR BLD: 10.8 % (ref 4–15)
MONOCYTES NFR BLD: 8.7 % (ref 4–15)
NEUTROPHILS # BLD AUTO: 3.9 K/UL (ref 1.8–7.7)
NEUTROPHILS # BLD AUTO: 4 K/UL (ref 1.8–7.7)
NEUTROPHILS NFR BLD: 66.3 % (ref 38–73)
NEUTROPHILS NFR BLD: 66.9 % (ref 38–73)
NRBC BLD-RTO: 0 /100 WBC
NRBC BLD-RTO: 0 /100 WBC
PHOSPHATE SERPL-MCNC: 2.5 MG/DL (ref 2.7–4.5)
PHOSPHATE SERPL-MCNC: 2.5 MG/DL (ref 2.7–4.5)
PHOSPHATE SERPL-MCNC: 3 MG/DL (ref 2.7–4.5)
PLATELET # BLD AUTO: 195 K/UL (ref 150–450)
PLATELET # BLD AUTO: 236 K/UL (ref 150–450)
PMV BLD AUTO: 10.3 FL (ref 9.2–12.9)
PMV BLD AUTO: 10.9 FL (ref 9.2–12.9)
POCT GLUCOSE: 103 MG/DL (ref 70–110)
POCT GLUCOSE: 307 MG/DL (ref 70–110)
POCT GLUCOSE: 82 MG/DL (ref 70–110)
POTASSIUM SERPL-SCNC: 3.5 MMOL/L (ref 3.5–5.1)
POTASSIUM SERPL-SCNC: 3.5 MMOL/L (ref 3.5–5.1)
POTASSIUM SERPL-SCNC: 4 MMOL/L (ref 3.5–5.1)
PROT SERPL-MCNC: 5.6 G/DL (ref 6–8.4)
RBC # BLD AUTO: 2.66 M/UL (ref 4–5.4)
RBC # BLD AUTO: 3.43 M/UL (ref 4–5.4)
SODIUM SERPL-SCNC: 144 MMOL/L (ref 136–145)
SODIUM SERPL-SCNC: 150 MMOL/L (ref 136–145)
SODIUM SERPL-SCNC: 150 MMOL/L (ref 136–145)
WBC # BLD AUTO: 5.83 K/UL (ref 3.9–12.7)
WBC # BLD AUTO: 6.01 K/UL (ref 3.9–12.7)

## 2021-11-23 PROCEDURE — 85025 COMPLETE CBC W/AUTO DIFF WBC: CPT | Mod: HCNC | Performed by: HOSPITALIST

## 2021-11-23 PROCEDURE — 99225 PR SUBSEQUENT OBSERVATION CARE,LEVEL II: ICD-10-PCS | Mod: HCNC,,, | Performed by: HOSPITALIST

## 2021-11-23 PROCEDURE — 25000003 PHARM REV CODE 250: Mod: HCNC

## 2021-11-23 PROCEDURE — 80069 RENAL FUNCTION PANEL: CPT | Mod: HCNC | Performed by: HOSPITALIST

## 2021-11-23 PROCEDURE — 36415 COLL VENOUS BLD VENIPUNCTURE: CPT | Mod: HCNC | Performed by: HOSPITALIST

## 2021-11-23 PROCEDURE — 63600175 PHARM REV CODE 636 W HCPCS: Mod: HCNC | Performed by: HOSPITALIST

## 2021-11-23 PROCEDURE — 99233 SBSQ HOSP IP/OBS HIGH 50: CPT | Mod: HCNC,,, | Performed by: PHYSICIAN ASSISTANT

## 2021-11-23 PROCEDURE — 80053 COMPREHEN METABOLIC PANEL: CPT | Mod: HCNC | Performed by: HOSPITALIST

## 2021-11-23 PROCEDURE — 97165 OT EVAL LOW COMPLEX 30 MIN: CPT | Mod: HCNC

## 2021-11-23 PROCEDURE — 94760 N-INVAS EAR/PLS OXIMETRY 1: CPT | Mod: HCNC

## 2021-11-23 PROCEDURE — 97116 GAIT TRAINING THERAPY: CPT | Mod: HCNC

## 2021-11-23 PROCEDURE — 99233 PR SUBSEQUENT HOSPITAL CARE,LEVL III: ICD-10-PCS | Mod: HCNC,,, | Performed by: PHYSICIAN ASSISTANT

## 2021-11-23 PROCEDURE — 80069 RENAL FUNCTION PANEL: CPT | Mod: 91,HCNC | Performed by: HOSPITALIST

## 2021-11-23 PROCEDURE — 94761 N-INVAS EAR/PLS OXIMETRY MLT: CPT | Mod: HCNC

## 2021-11-23 PROCEDURE — 82272 OCCULT BLD FECES 1-3 TESTS: CPT | Mod: HCNC | Performed by: HOSPITALIST

## 2021-11-23 PROCEDURE — 83735 ASSAY OF MAGNESIUM: CPT | Mod: HCNC | Performed by: HOSPITALIST

## 2021-11-23 PROCEDURE — 63600175 PHARM REV CODE 636 W HCPCS: Mod: HCNC

## 2021-11-23 PROCEDURE — 97161 PT EVAL LOW COMPLEX 20 MIN: CPT | Mod: HCNC

## 2021-11-23 PROCEDURE — 99225 PR SUBSEQUENT OBSERVATION CARE,LEVEL II: CPT | Mod: HCNC,,, | Performed by: HOSPITALIST

## 2021-11-23 PROCEDURE — 86900 BLOOD TYPING SEROLOGIC ABO: CPT | Mod: HCNC | Performed by: HOSPITALIST

## 2021-11-23 PROCEDURE — 12000002 HC ACUTE/MED SURGE SEMI-PRIVATE ROOM: Mod: HCNC

## 2021-11-23 PROCEDURE — 97535 SELF CARE MNGMENT TRAINING: CPT | Mod: HCNC

## 2021-11-23 PROCEDURE — 25000003 PHARM REV CODE 250: Mod: HCNC | Performed by: HOSPITALIST

## 2021-11-23 RX ORDER — SODIUM CHLORIDE 9 MG/ML
INJECTION, SOLUTION INTRAVENOUS CONTINUOUS
Status: DISCONTINUED | OUTPATIENT
Start: 2021-11-23 | End: 2021-11-23

## 2021-11-23 RX ORDER — SODIUM CHLORIDE 450 MG/100ML
INJECTION, SOLUTION INTRAVENOUS CONTINUOUS
Status: ACTIVE | OUTPATIENT
Start: 2021-11-23 | End: 2021-11-23

## 2021-11-23 RX ORDER — SODIUM,POTASSIUM PHOSPHATES 280-250MG
2 POWDER IN PACKET (EA) ORAL
Status: DISCONTINUED | OUTPATIENT
Start: 2021-11-23 | End: 2021-11-23

## 2021-11-23 RX ORDER — ACETAMINOPHEN 500 MG
1000 TABLET ORAL ONCE
Status: DISCONTINUED | OUTPATIENT
Start: 2021-11-23 | End: 2021-11-23

## 2021-11-23 RX ORDER — HYDROCODONE BITARTRATE AND ACETAMINOPHEN 500; 5 MG/1; MG/1
TABLET ORAL
Status: CANCELLED | OUTPATIENT
Start: 2021-11-23

## 2021-11-23 RX ORDER — SODIUM,POTASSIUM PHOSPHATES 280-250MG
2 POWDER IN PACKET (EA) ORAL
Status: COMPLETED | OUTPATIENT
Start: 2021-11-23 | End: 2021-11-23

## 2021-11-23 RX ORDER — ALPRAZOLAM 0.25 MG/1
0.25 TABLET ORAL ONCE
Status: DISCONTINUED | OUTPATIENT
Start: 2021-11-23 | End: 2021-11-23

## 2021-11-23 RX ADMIN — POTASSIUM & SODIUM PHOSPHATES POWDER PACK 280-160-250 MG 2 PACKET: 280-160-250 PACK at 10:11

## 2021-11-23 RX ADMIN — OXYCODONE HYDROCHLORIDE 15 MG: 10 TABLET ORAL at 10:11

## 2021-11-23 RX ADMIN — SODIUM CHLORIDE: 0.9 INJECTION, SOLUTION INTRAVENOUS at 09:11

## 2021-11-23 RX ADMIN — SODIUM CHLORIDE: 0.45 INJECTION, SOLUTION INTRAVENOUS at 10:11

## 2021-11-23 RX ADMIN — CYANOCOBALAMIN TAB 1000 MCG 1000 MCG: 1000 TAB at 09:11

## 2021-11-23 RX ADMIN — ATORVASTATIN CALCIUM 40 MG: 20 TABLET, FILM COATED ORAL at 09:11

## 2021-11-23 RX ADMIN — OXYCODONE HYDROCHLORIDE 15 MG: 10 TABLET ORAL at 11:11

## 2021-11-23 RX ADMIN — METOPROLOL TARTRATE 25 MG: 25 TABLET, FILM COATED ORAL at 09:11

## 2021-11-23 RX ADMIN — OXYCODONE HYDROCHLORIDE 15 MG: 10 TABLET ORAL at 04:11

## 2021-11-23 RX ADMIN — PIPERACILLIN SODIUM AND TAZOBACTAM SODIUM 4.5 G: 4; .5 INJECTION, POWDER, FOR SOLUTION INTRAVENOUS at 01:11

## 2021-11-23 RX ADMIN — OXYCODONE HYDROCHLORIDE 15 MG: 10 TABLET ORAL at 03:11

## 2021-11-23 RX ADMIN — PIPERACILLIN SODIUM AND TAZOBACTAM SODIUM 4.5 G: 4; .5 INJECTION, POWDER, FOR SOLUTION INTRAVENOUS at 09:11

## 2021-11-23 RX ADMIN — AMLODIPINE BESYLATE 10 MG: 10 TABLET ORAL at 09:11

## 2021-11-23 RX ADMIN — PIPERACILLIN SODIUM AND TAZOBACTAM SODIUM 4.5 G: 4; .5 INJECTION, POWDER, FOR SOLUTION INTRAVENOUS at 05:11

## 2021-11-23 RX ADMIN — POTASSIUM & SODIUM PHOSPHATES POWDER PACK 280-160-250 MG 2 PACKET: 280-160-250 PACK at 04:11

## 2021-11-24 LAB
ALBUMIN SERPL BCP-MCNC: 2.4 G/DL (ref 3.5–5.2)
ALBUMIN SERPL BCP-MCNC: 2.5 G/DL (ref 3.5–5.2)
ALBUMIN SERPL BCP-MCNC: 2.5 G/DL (ref 3.5–5.2)
ALP SERPL-CCNC: 251 U/L (ref 55–135)
ALT SERPL W/O P-5'-P-CCNC: 15 U/L (ref 10–44)
ANION GAP SERPL CALC-SCNC: 11 MMOL/L (ref 8–16)
ANION GAP SERPL CALC-SCNC: 11 MMOL/L (ref 8–16)
ANION GAP SERPL CALC-SCNC: 12 MMOL/L (ref 8–16)
AST SERPL-CCNC: 15 U/L (ref 10–40)
BACTERIA UR CULT: ABNORMAL
BASOPHILS # BLD AUTO: 0.04 K/UL (ref 0–0.2)
BASOPHILS NFR BLD: 0.7 % (ref 0–1.9)
BILIRUB SERPL-MCNC: 0.5 MG/DL (ref 0.1–1)
BUN SERPL-MCNC: 10 MG/DL (ref 8–23)
BUN SERPL-MCNC: 13 MG/DL (ref 8–23)
BUN SERPL-MCNC: 13 MG/DL (ref 8–23)
CALCIUM SERPL-MCNC: 8.7 MG/DL (ref 8.7–10.5)
CALCIUM SERPL-MCNC: 8.7 MG/DL (ref 8.7–10.5)
CALCIUM SERPL-MCNC: 9.4 MG/DL (ref 8.7–10.5)
CHLORIDE SERPL-SCNC: 108 MMOL/L (ref 95–110)
CHLORIDE SERPL-SCNC: 108 MMOL/L (ref 95–110)
CHLORIDE SERPL-SCNC: 110 MMOL/L (ref 95–110)
CO2 SERPL-SCNC: 20 MMOL/L (ref 23–29)
CO2 SERPL-SCNC: 24 MMOL/L (ref 23–29)
CO2 SERPL-SCNC: 24 MMOL/L (ref 23–29)
CREAT SERPL-MCNC: 1 MG/DL (ref 0.5–1.4)
CREAT SERPL-MCNC: 1.1 MG/DL (ref 0.5–1.4)
CREAT SERPL-MCNC: 1.1 MG/DL (ref 0.5–1.4)
DIFFERENTIAL METHOD: ABNORMAL
EOSINOPHIL # BLD AUTO: 0.3 K/UL (ref 0–0.5)
EOSINOPHIL NFR BLD: 4.7 % (ref 0–8)
ERYTHROCYTE [DISTWIDTH] IN BLOOD BY AUTOMATED COUNT: 13.8 % (ref 11.5–14.5)
EST. GFR  (AFRICAN AMERICAN): 59.2 ML/MIN/1.73 M^2
EST. GFR  (AFRICAN AMERICAN): 59.2 ML/MIN/1.73 M^2
EST. GFR  (AFRICAN AMERICAN): >60 ML/MIN/1.73 M^2
EST. GFR  (NON AFRICAN AMERICAN): 51.3 ML/MIN/1.73 M^2
EST. GFR  (NON AFRICAN AMERICAN): 51.3 ML/MIN/1.73 M^2
EST. GFR  (NON AFRICAN AMERICAN): 57.6 ML/MIN/1.73 M^2
GLUCOSE SERPL-MCNC: 91 MG/DL (ref 70–110)
GLUCOSE SERPL-MCNC: 91 MG/DL (ref 70–110)
GLUCOSE SERPL-MCNC: 95 MG/DL (ref 70–110)
HCT VFR BLD AUTO: 30 % (ref 37–48.5)
HGB BLD-MCNC: 8.9 G/DL (ref 12–16)
IMM GRANULOCYTES # BLD AUTO: 0.02 K/UL (ref 0–0.04)
IMM GRANULOCYTES NFR BLD AUTO: 0.3 % (ref 0–0.5)
LYMPHOCYTES # BLD AUTO: 1 K/UL (ref 1–4.8)
LYMPHOCYTES NFR BLD: 17.3 % (ref 18–48)
MAGNESIUM SERPL-MCNC: 1.8 MG/DL (ref 1.6–2.6)
MCH RBC QN AUTO: 27.1 PG (ref 27–31)
MCHC RBC AUTO-ENTMCNC: 29.7 G/DL (ref 32–36)
MCV RBC AUTO: 91 FL (ref 82–98)
MONOCYTES # BLD AUTO: 0.7 K/UL (ref 0.3–1)
MONOCYTES NFR BLD: 11.1 % (ref 4–15)
NEUTROPHILS # BLD AUTO: 3.9 K/UL (ref 1.8–7.7)
NEUTROPHILS NFR BLD: 65.9 % (ref 38–73)
NRBC BLD-RTO: 0 /100 WBC
OB PNL STL: NEGATIVE
PHOSPHATE SERPL-MCNC: 2.6 MG/DL (ref 2.7–4.5)
PHOSPHATE SERPL-MCNC: 3.3 MG/DL (ref 2.7–4.5)
PHOSPHATE SERPL-MCNC: 3.3 MG/DL (ref 2.7–4.5)
PLATELET # BLD AUTO: 240 K/UL (ref 150–450)
PMV BLD AUTO: 10.9 FL (ref 9.2–12.9)
POCT GLUCOSE: 111 MG/DL (ref 70–110)
POCT GLUCOSE: 116 MG/DL (ref 70–110)
POCT GLUCOSE: 117 MG/DL (ref 70–110)
POTASSIUM SERPL-SCNC: 4.3 MMOL/L (ref 3.5–5.1)
POTASSIUM SERPL-SCNC: 4.3 MMOL/L (ref 3.5–5.1)
POTASSIUM SERPL-SCNC: 4.6 MMOL/L (ref 3.5–5.1)
PROT SERPL-MCNC: 6.2 G/DL (ref 6–8.4)
RBC # BLD AUTO: 3.29 M/UL (ref 4–5.4)
SODIUM SERPL-SCNC: 142 MMOL/L (ref 136–145)
SODIUM SERPL-SCNC: 143 MMOL/L (ref 136–145)
SODIUM SERPL-SCNC: 143 MMOL/L (ref 136–145)
WBC # BLD AUTO: 5.94 K/UL (ref 3.9–12.7)

## 2021-11-24 PROCEDURE — 37000009 HC ANESTHESIA EA ADD 15 MINS: Mod: HCNC | Performed by: INTERNAL MEDICINE

## 2021-11-24 PROCEDURE — 36415 COLL VENOUS BLD VENIPUNCTURE: CPT | Mod: HCNC | Performed by: HOSPITALIST

## 2021-11-24 PROCEDURE — D9220A PRA ANESTHESIA: Mod: HCNC,CRNA,, | Performed by: NURSE ANESTHETIST, CERTIFIED REGISTERED

## 2021-11-24 PROCEDURE — 74328 PR  X-RAY FOR BILE DUCT ENDOSCOPY: ICD-10-PCS | Mod: 26,HCNC,, | Performed by: INTERNAL MEDICINE

## 2021-11-24 PROCEDURE — 43264 PR ERCP,W/REMOVAL STONE,BIL/PANCR DUCTS: ICD-10-PCS | Mod: HCNC,,, | Performed by: INTERNAL MEDICINE

## 2021-11-24 PROCEDURE — 43264 ERCP REMOVE DUCT CALCULI: CPT | Mod: HCNC,,, | Performed by: INTERNAL MEDICINE

## 2021-11-24 PROCEDURE — 99232 SBSQ HOSP IP/OBS MODERATE 35: CPT | Mod: HCNC,,, | Performed by: HOSPITALIST

## 2021-11-24 PROCEDURE — 99233 PR SUBSEQUENT HOSPITAL CARE,LEVL III: ICD-10-PCS | Mod: HCNC,,, | Performed by: PHYSICIAN ASSISTANT

## 2021-11-24 PROCEDURE — 99232 PR SUBSEQUENT HOSPITAL CARE,LEVL II: ICD-10-PCS | Mod: HCNC,,, | Performed by: HOSPITALIST

## 2021-11-24 PROCEDURE — 97110 THERAPEUTIC EXERCISES: CPT | Mod: HCNC,CO

## 2021-11-24 PROCEDURE — 25000003 PHARM REV CODE 250: Mod: HCNC | Performed by: NURSE ANESTHETIST, CERTIFIED REGISTERED

## 2021-11-24 PROCEDURE — 43262 ENDO CHOLANGIOPANCREATOGRAPH: CPT | Mod: 51,HCNC,, | Performed by: INTERNAL MEDICINE

## 2021-11-24 PROCEDURE — 74328 X-RAY BILE DUCT ENDOSCOPY: CPT | Mod: HCNC | Performed by: INTERNAL MEDICINE

## 2021-11-24 PROCEDURE — 74328 X-RAY BILE DUCT ENDOSCOPY: CPT | Mod: 26,HCNC,, | Performed by: INTERNAL MEDICINE

## 2021-11-24 PROCEDURE — 99233 SBSQ HOSP IP/OBS HIGH 50: CPT | Mod: HCNC,,, | Performed by: PHYSICIAN ASSISTANT

## 2021-11-24 PROCEDURE — C1769 GUIDE WIRE: HCPCS | Mod: HCNC | Performed by: INTERNAL MEDICINE

## 2021-11-24 PROCEDURE — 99223 1ST HOSP IP/OBS HIGH 75: CPT | Mod: 25,HCNC,GC, | Performed by: INTERNAL MEDICINE

## 2021-11-24 PROCEDURE — 83735 ASSAY OF MAGNESIUM: CPT | Mod: HCNC | Performed by: HOSPITALIST

## 2021-11-24 PROCEDURE — D9220A PRA ANESTHESIA: ICD-10-PCS | Mod: HCNC,CRNA,, | Performed by: NURSE ANESTHETIST, CERTIFIED REGISTERED

## 2021-11-24 PROCEDURE — 27202125 HC BALLOON, EXTRACTION (ANY): Mod: HCNC | Performed by: INTERNAL MEDICINE

## 2021-11-24 PROCEDURE — 80053 COMPREHEN METABOLIC PANEL: CPT | Mod: HCNC | Performed by: HOSPITALIST

## 2021-11-24 PROCEDURE — 37000008 HC ANESTHESIA 1ST 15 MINUTES: Mod: HCNC | Performed by: INTERNAL MEDICINE

## 2021-11-24 PROCEDURE — 63600175 PHARM REV CODE 636 W HCPCS: Mod: HCNC | Performed by: NURSE ANESTHETIST, CERTIFIED REGISTERED

## 2021-11-24 PROCEDURE — D9220A PRA ANESTHESIA: ICD-10-PCS | Mod: HCNC,ANES,, | Performed by: STUDENT IN AN ORGANIZED HEALTH CARE EDUCATION/TRAINING PROGRAM

## 2021-11-24 PROCEDURE — 99223 PR INITIAL HOSPITAL CARE,LEVL III: ICD-10-PCS | Mod: 25,HCNC,GC, | Performed by: INTERNAL MEDICINE

## 2021-11-24 PROCEDURE — S5010 5% DEXTROSE AND 0.45% SALINE: HCPCS | Mod: HCNC | Performed by: HOSPITALIST

## 2021-11-24 PROCEDURE — 80069 RENAL FUNCTION PANEL: CPT | Mod: 91,HCNC | Performed by: HOSPITALIST

## 2021-11-24 PROCEDURE — 43264 ERCP REMOVE DUCT CALCULI: CPT | Mod: HCNC | Performed by: INTERNAL MEDICINE

## 2021-11-24 PROCEDURE — 43262 PR ERCP,SPHINCTEROTOMY: ICD-10-PCS | Mod: 51,HCNC,, | Performed by: INTERNAL MEDICINE

## 2021-11-24 PROCEDURE — 80069 RENAL FUNCTION PANEL: CPT | Mod: HCNC | Performed by: HOSPITALIST

## 2021-11-24 PROCEDURE — 63600175 PHARM REV CODE 636 W HCPCS: Mod: HCNC | Performed by: HOSPITALIST

## 2021-11-24 PROCEDURE — 27201674 HC SPHINCTERTOME: Mod: HCNC | Performed by: INTERNAL MEDICINE

## 2021-11-24 PROCEDURE — 85025 COMPLETE CBC W/AUTO DIFF WBC: CPT | Mod: HCNC | Performed by: HOSPITALIST

## 2021-11-24 PROCEDURE — 43262 ENDO CHOLANGIOPANCREATOGRAPH: CPT | Mod: HCNC | Performed by: INTERNAL MEDICINE

## 2021-11-24 PROCEDURE — D9220A PRA ANESTHESIA: Mod: HCNC,ANES,, | Performed by: STUDENT IN AN ORGANIZED HEALTH CARE EDUCATION/TRAINING PROGRAM

## 2021-11-24 PROCEDURE — 25000003 PHARM REV CODE 250: Mod: HCNC | Performed by: HOSPITALIST

## 2021-11-24 PROCEDURE — 25500020 PHARM REV CODE 255: Mod: HCNC | Performed by: INTERNAL MEDICINE

## 2021-11-24 PROCEDURE — 97535 SELF CARE MNGMENT TRAINING: CPT | Mod: HCNC,CO

## 2021-11-24 PROCEDURE — 12000002 HC ACUTE/MED SURGE SEMI-PRIVATE ROOM: Mod: HCNC

## 2021-11-24 RX ORDER — DEXTROSE MONOHYDRATE AND SODIUM CHLORIDE 5; .45 G/100ML; G/100ML
INJECTION, SOLUTION INTRAVENOUS CONTINUOUS
Status: DISCONTINUED | OUTPATIENT
Start: 2021-11-24 | End: 2021-11-25

## 2021-11-24 RX ORDER — LIDOCAINE HYDROCHLORIDE 20 MG/ML
INJECTION INTRAVENOUS
Status: DISCONTINUED | OUTPATIENT
Start: 2021-11-24 | End: 2021-11-24

## 2021-11-24 RX ORDER — PROPOFOL 10 MG/ML
VIAL (ML) INTRAVENOUS
Status: DISCONTINUED | OUTPATIENT
Start: 2021-11-24 | End: 2021-11-24

## 2021-11-24 RX ORDER — HALOPERIDOL 5 MG/ML
0.5 INJECTION INTRAMUSCULAR EVERY 10 MIN PRN
Status: DISCONTINUED | OUTPATIENT
Start: 2021-11-24 | End: 2021-11-24 | Stop reason: HOSPADM

## 2021-11-24 RX ORDER — PROPOFOL 10 MG/ML
VIAL (ML) INTRAVENOUS CONTINUOUS PRN
Status: DISCONTINUED | OUTPATIENT
Start: 2021-11-24 | End: 2021-11-24

## 2021-11-24 RX ORDER — SODIUM CHLORIDE 0.9 % (FLUSH) 0.9 %
3 SYRINGE (ML) INJECTION EVERY 4 HOURS PRN
Status: DISCONTINUED | OUTPATIENT
Start: 2021-11-24 | End: 2021-11-24 | Stop reason: HOSPADM

## 2021-11-24 RX ORDER — MORPHINE SULFATE 2 MG/ML
2 INJECTION, SOLUTION INTRAMUSCULAR; INTRAVENOUS EVERY 6 HOURS PRN
Status: DISCONTINUED | OUTPATIENT
Start: 2021-11-24 | End: 2021-11-27

## 2021-11-24 RX ADMIN — PIPERACILLIN SODIUM AND TAZOBACTAM SODIUM 4.5 G: 4; .5 INJECTION, POWDER, FOR SOLUTION INTRAVENOUS at 05:11

## 2021-11-24 RX ADMIN — PIPERACILLIN SODIUM AND TAZOBACTAM SODIUM 4.5 G: 4; .5 INJECTION, POWDER, FOR SOLUTION INTRAVENOUS at 11:11

## 2021-11-24 RX ADMIN — SODIUM CHLORIDE: 0.9 INJECTION, SOLUTION INTRAVENOUS at 02:11

## 2021-11-24 RX ADMIN — METOPROLOL TARTRATE 25 MG: 25 TABLET, FILM COATED ORAL at 09:11

## 2021-11-24 RX ADMIN — METOPROLOL TARTRATE 25 MG: 25 TABLET, FILM COATED ORAL at 08:11

## 2021-11-24 RX ADMIN — CYANOCOBALAMIN TAB 1000 MCG 1000 MCG: 1000 TAB at 08:11

## 2021-11-24 RX ADMIN — OXYCODONE HYDROCHLORIDE 15 MG: 10 TABLET ORAL at 10:11

## 2021-11-24 RX ADMIN — DEXTROSE AND SODIUM CHLORIDE: 5; .45 INJECTION, SOLUTION INTRAVENOUS at 11:11

## 2021-11-24 RX ADMIN — IOHEXOL 5 ML: 300 INJECTION, SOLUTION INTRAVENOUS at 03:11

## 2021-11-24 RX ADMIN — LIDOCAINE HYDROCHLORIDE 50 MG: 20 INJECTION, SOLUTION INTRAVENOUS at 02:11

## 2021-11-24 RX ADMIN — GLYCOPYRROLATE 0.2 MG: 0.2 INJECTION, SOLUTION INTRAMUSCULAR; INTRAVITREAL at 02:11

## 2021-11-24 RX ADMIN — ATORVASTATIN CALCIUM 40 MG: 20 TABLET, FILM COATED ORAL at 09:11

## 2021-11-24 RX ADMIN — Medication 150 MCG/KG/MIN: at 02:11

## 2021-11-24 RX ADMIN — PIPERACILLIN SODIUM AND TAZOBACTAM SODIUM 4.5 G: 4; .5 INJECTION, POWDER, FOR SOLUTION INTRAVENOUS at 01:11

## 2021-11-24 RX ADMIN — MORPHINE SULFATE 2 MG: 2 INJECTION, SOLUTION INTRAMUSCULAR; INTRAVENOUS at 09:11

## 2021-11-24 RX ADMIN — PROPOFOL 50 MG: 10 INJECTION, EMULSION INTRAVENOUS at 02:11

## 2021-11-24 RX ADMIN — AMLODIPINE BESYLATE 10 MG: 10 TABLET ORAL at 08:11

## 2021-11-24 RX ADMIN — OXYCODONE HYDROCHLORIDE 15 MG: 10 TABLET ORAL at 04:11

## 2021-11-25 PROBLEM — K80.33 CALCULUS OF BILE DUCT WITH ACUTE CHOLANGITIS WITH OBSTRUCTION: Status: ACTIVE | Noted: 2021-11-25

## 2021-11-25 LAB
ALBUMIN SERPL BCP-MCNC: 2.4 G/DL (ref 3.5–5.2)
ALBUMIN SERPL BCP-MCNC: 2.4 G/DL (ref 3.5–5.2)
ALP SERPL-CCNC: 489 U/L (ref 55–135)
ALT SERPL W/O P-5'-P-CCNC: 47 U/L (ref 10–44)
ANION GAP SERPL CALC-SCNC: 10 MMOL/L (ref 8–16)
ANION GAP SERPL CALC-SCNC: 10 MMOL/L (ref 8–16)
AST SERPL-CCNC: 92 U/L (ref 10–40)
BACTERIA BLD CULT: NORMAL
BACTERIA BLD CULT: NORMAL
BASOPHILS # BLD AUTO: 0.02 K/UL (ref 0–0.2)
BASOPHILS NFR BLD: 0.3 % (ref 0–1.9)
BILIRUB SERPL-MCNC: 1.5 MG/DL (ref 0.1–1)
BUN SERPL-MCNC: 9 MG/DL (ref 8–23)
BUN SERPL-MCNC: 9 MG/DL (ref 8–23)
CALCIUM SERPL-MCNC: 8.5 MG/DL (ref 8.7–10.5)
CALCIUM SERPL-MCNC: 8.5 MG/DL (ref 8.7–10.5)
CHLORIDE SERPL-SCNC: 108 MMOL/L (ref 95–110)
CHLORIDE SERPL-SCNC: 108 MMOL/L (ref 95–110)
CO2 SERPL-SCNC: 25 MMOL/L (ref 23–29)
CO2 SERPL-SCNC: 25 MMOL/L (ref 23–29)
CREAT SERPL-MCNC: 1 MG/DL (ref 0.5–1.4)
CREAT SERPL-MCNC: 1 MG/DL (ref 0.5–1.4)
DIFFERENTIAL METHOD: ABNORMAL
EOSINOPHIL # BLD AUTO: 0.1 K/UL (ref 0–0.5)
EOSINOPHIL NFR BLD: 2.3 % (ref 0–8)
ERYTHROCYTE [DISTWIDTH] IN BLOOD BY AUTOMATED COUNT: 13.6 % (ref 11.5–14.5)
EST. GFR  (AFRICAN AMERICAN): >60 ML/MIN/1.73 M^2
EST. GFR  (AFRICAN AMERICAN): >60 ML/MIN/1.73 M^2
EST. GFR  (NON AFRICAN AMERICAN): 57.6 ML/MIN/1.73 M^2
EST. GFR  (NON AFRICAN AMERICAN): 57.6 ML/MIN/1.73 M^2
GLUCOSE SERPL-MCNC: 143 MG/DL (ref 70–110)
GLUCOSE SERPL-MCNC: 143 MG/DL (ref 70–110)
HCT VFR BLD AUTO: 27.8 % (ref 37–48.5)
HGB BLD-MCNC: 8.3 G/DL (ref 12–16)
IMM GRANULOCYTES # BLD AUTO: 0.03 K/UL (ref 0–0.04)
IMM GRANULOCYTES NFR BLD AUTO: 0.5 % (ref 0–0.5)
LIPASE SERPL-CCNC: 11 U/L (ref 4–60)
LYMPHOCYTES # BLD AUTO: 0.7 K/UL (ref 1–4.8)
LYMPHOCYTES NFR BLD: 10.7 % (ref 18–48)
MAGNESIUM SERPL-MCNC: 1.5 MG/DL (ref 1.6–2.6)
MCH RBC QN AUTO: 26.8 PG (ref 27–31)
MCHC RBC AUTO-ENTMCNC: 29.9 G/DL (ref 32–36)
MCV RBC AUTO: 90 FL (ref 82–98)
MONOCYTES # BLD AUTO: 0.7 K/UL (ref 0.3–1)
MONOCYTES NFR BLD: 11.2 % (ref 4–15)
NEUTROPHILS # BLD AUTO: 4.5 K/UL (ref 1.8–7.7)
NEUTROPHILS NFR BLD: 75 % (ref 38–73)
NRBC BLD-RTO: 0 /100 WBC
PHOSPHATE SERPL-MCNC: 2.1 MG/DL (ref 2.7–4.5)
PHOSPHATE SERPL-MCNC: 2.1 MG/DL (ref 2.7–4.5)
PLATELET # BLD AUTO: 236 K/UL (ref 150–450)
PMV BLD AUTO: 10.6 FL (ref 9.2–12.9)
POCT GLUCOSE: 137 MG/DL (ref 70–110)
POTASSIUM SERPL-SCNC: 3.7 MMOL/L (ref 3.5–5.1)
POTASSIUM SERPL-SCNC: 3.7 MMOL/L (ref 3.5–5.1)
PROT SERPL-MCNC: 6.1 G/DL (ref 6–8.4)
RBC # BLD AUTO: 3.1 M/UL (ref 4–5.4)
SODIUM SERPL-SCNC: 143 MMOL/L (ref 136–145)
SODIUM SERPL-SCNC: 143 MMOL/L (ref 136–145)
WBC # BLD AUTO: 6.06 K/UL (ref 3.9–12.7)

## 2021-11-25 PROCEDURE — 83735 ASSAY OF MAGNESIUM: CPT | Mod: HCNC | Performed by: HOSPITALIST

## 2021-11-25 PROCEDURE — 63600175 PHARM REV CODE 636 W HCPCS: Mod: HCNC | Performed by: HOSPITALIST

## 2021-11-25 PROCEDURE — 25000003 PHARM REV CODE 250: Mod: HCNC | Performed by: HOSPITALIST

## 2021-11-25 PROCEDURE — 36415 COLL VENOUS BLD VENIPUNCTURE: CPT | Mod: HCNC | Performed by: HOSPITALIST

## 2021-11-25 PROCEDURE — 83690 ASSAY OF LIPASE: CPT | Mod: HCNC | Performed by: HOSPITALIST

## 2021-11-25 PROCEDURE — 12000002 HC ACUTE/MED SURGE SEMI-PRIVATE ROOM: Mod: HCNC

## 2021-11-25 PROCEDURE — 85025 COMPLETE CBC W/AUTO DIFF WBC: CPT | Mod: HCNC | Performed by: HOSPITALIST

## 2021-11-25 PROCEDURE — 99232 PR SUBSEQUENT HOSPITAL CARE,LEVL II: ICD-10-PCS | Mod: HCNC,,, | Performed by: HOSPITALIST

## 2021-11-25 PROCEDURE — 80053 COMPREHEN METABOLIC PANEL: CPT | Mod: HCNC | Performed by: HOSPITALIST

## 2021-11-25 PROCEDURE — 80069 RENAL FUNCTION PANEL: CPT | Mod: HCNC | Performed by: HOSPITALIST

## 2021-11-25 PROCEDURE — 99232 SBSQ HOSP IP/OBS MODERATE 35: CPT | Mod: HCNC,,, | Performed by: HOSPITALIST

## 2021-11-25 RX ORDER — SODIUM,POTASSIUM PHOSPHATES 280-250MG
1 POWDER IN PACKET (EA) ORAL
Status: DISCONTINUED | OUTPATIENT
Start: 2021-11-25 | End: 2021-11-28

## 2021-11-25 RX ORDER — MAGNESIUM SULFATE HEPTAHYDRATE 40 MG/ML
2 INJECTION, SOLUTION INTRAVENOUS ONCE
Status: COMPLETED | OUTPATIENT
Start: 2021-11-25 | End: 2021-11-25

## 2021-11-25 RX ADMIN — MORPHINE SULFATE 2 MG: 2 INJECTION, SOLUTION INTRAMUSCULAR; INTRAVENOUS at 08:11

## 2021-11-25 RX ADMIN — METOPROLOL TARTRATE 25 MG: 25 TABLET, FILM COATED ORAL at 08:11

## 2021-11-25 RX ADMIN — OXYCODONE HYDROCHLORIDE 15 MG: 10 TABLET ORAL at 02:11

## 2021-11-25 RX ADMIN — POTASSIUM & SODIUM PHOSPHATES POWDER PACK 280-160-250 MG 1 PACKET: 280-160-250 PACK at 12:11

## 2021-11-25 RX ADMIN — MAGNESIUM SULFATE IN WATER 2 G: 40 INJECTION, SOLUTION INTRAVENOUS at 12:11

## 2021-11-25 RX ADMIN — POTASSIUM & SODIUM PHOSPHATES POWDER PACK 280-160-250 MG 1 PACKET: 280-160-250 PACK at 08:11

## 2021-11-25 RX ADMIN — AMLODIPINE BESYLATE 10 MG: 10 TABLET ORAL at 08:11

## 2021-11-25 RX ADMIN — OXYCODONE HYDROCHLORIDE 15 MG: 10 TABLET ORAL at 08:11

## 2021-11-25 RX ADMIN — POTASSIUM & SODIUM PHOSPHATES POWDER PACK 280-160-250 MG 1 PACKET: 280-160-250 PACK at 05:11

## 2021-11-25 RX ADMIN — PIPERACILLIN SODIUM AND TAZOBACTAM SODIUM 4.5 G: 4; .5 INJECTION, POWDER, FOR SOLUTION INTRAVENOUS at 02:11

## 2021-11-25 RX ADMIN — CYANOCOBALAMIN TAB 1000 MCG 1000 MCG: 1000 TAB at 08:11

## 2021-11-25 RX ADMIN — PIPERACILLIN SODIUM AND TAZOBACTAM SODIUM 4.5 G: 4; .5 INJECTION, POWDER, FOR SOLUTION INTRAVENOUS at 11:11

## 2021-11-25 RX ADMIN — PIPERACILLIN SODIUM AND TAZOBACTAM SODIUM 4.5 G: 4; .5 INJECTION, POWDER, FOR SOLUTION INTRAVENOUS at 06:11

## 2021-11-25 RX ADMIN — ATORVASTATIN CALCIUM 40 MG: 20 TABLET, FILM COATED ORAL at 08:11

## 2021-11-26 LAB
ALBUMIN SERPL BCP-MCNC: 2.5 G/DL (ref 3.5–5.2)
ALP SERPL-CCNC: 453 U/L (ref 55–135)
ALT SERPL W/O P-5'-P-CCNC: 44 U/L (ref 10–44)
ANION GAP SERPL CALC-SCNC: 8 MMOL/L (ref 8–16)
AST SERPL-CCNC: 55 U/L (ref 10–40)
BILIRUB SERPL-MCNC: 1 MG/DL (ref 0.1–1)
BUN SERPL-MCNC: 11 MG/DL (ref 8–23)
CALCIUM SERPL-MCNC: 8.7 MG/DL (ref 8.7–10.5)
CHLORIDE SERPL-SCNC: 106 MMOL/L (ref 95–110)
CO2 SERPL-SCNC: 26 MMOL/L (ref 23–29)
CREAT SERPL-MCNC: 1.2 MG/DL (ref 0.5–1.4)
EST. GFR  (AFRICAN AMERICAN): 53.3 ML/MIN/1.73 M^2
EST. GFR  (NON AFRICAN AMERICAN): 46.2 ML/MIN/1.73 M^2
GLUCOSE SERPL-MCNC: 108 MG/DL (ref 70–110)
MAGNESIUM SERPL-MCNC: 1.8 MG/DL (ref 1.6–2.6)
POTASSIUM SERPL-SCNC: 3.8 MMOL/L (ref 3.5–5.1)
PROT SERPL-MCNC: 6.3 G/DL (ref 6–8.4)
SODIUM SERPL-SCNC: 140 MMOL/L (ref 136–145)

## 2021-11-26 PROCEDURE — 99232 PR SUBSEQUENT HOSPITAL CARE,LEVL II: ICD-10-PCS | Mod: HCNC,,, | Performed by: HOSPITALIST

## 2021-11-26 PROCEDURE — 99232 SBSQ HOSP IP/OBS MODERATE 35: CPT | Mod: HCNC,,, | Performed by: HOSPITALIST

## 2021-11-26 PROCEDURE — 36415 COLL VENOUS BLD VENIPUNCTURE: CPT | Mod: HCNC | Performed by: HOSPITALIST

## 2021-11-26 PROCEDURE — 25000003 PHARM REV CODE 250: Mod: HCNC | Performed by: HOSPITALIST

## 2021-11-26 PROCEDURE — 27000207 HC ISOLATION: Mod: HCNC

## 2021-11-26 PROCEDURE — 80053 COMPREHEN METABOLIC PANEL: CPT | Mod: HCNC | Performed by: HOSPITALIST

## 2021-11-26 PROCEDURE — 99233 PR SUBSEQUENT HOSPITAL CARE,LEVL III: ICD-10-PCS | Mod: HCNC,,, | Performed by: NURSE PRACTITIONER

## 2021-11-26 PROCEDURE — 97110 THERAPEUTIC EXERCISES: CPT | Mod: HCNC

## 2021-11-26 PROCEDURE — 99233 SBSQ HOSP IP/OBS HIGH 50: CPT | Mod: HCNC,,, | Performed by: NURSE PRACTITIONER

## 2021-11-26 PROCEDURE — 97116 GAIT TRAINING THERAPY: CPT | Mod: HCNC

## 2021-11-26 PROCEDURE — 83735 ASSAY OF MAGNESIUM: CPT | Mod: HCNC | Performed by: HOSPITALIST

## 2021-11-26 PROCEDURE — 12000002 HC ACUTE/MED SURGE SEMI-PRIVATE ROOM: Mod: HCNC

## 2021-11-26 PROCEDURE — 63600175 PHARM REV CODE 636 W HCPCS: Mod: HCNC | Performed by: HOSPITALIST

## 2021-11-26 RX ORDER — LANOLIN ALCOHOL/MO/W.PET/CERES
400 CREAM (GRAM) TOPICAL 2 TIMES DAILY
Status: DISCONTINUED | OUTPATIENT
Start: 2021-11-26 | End: 2021-12-02 | Stop reason: HOSPADM

## 2021-11-26 RX ORDER — LOSARTAN POTASSIUM 50 MG/1
100 TABLET ORAL DAILY
Status: DISCONTINUED | OUTPATIENT
Start: 2021-11-27 | End: 2021-12-02 | Stop reason: HOSPADM

## 2021-11-26 RX ORDER — MECLIZINE HYDROCHLORIDE 25 MG/1
25 TABLET ORAL 3 TIMES DAILY PRN
Status: DISCONTINUED | OUTPATIENT
Start: 2021-11-26 | End: 2021-12-02 | Stop reason: HOSPADM

## 2021-11-26 RX ORDER — MICONAZOLE NITRATE 2 %
POWDER (GRAM) TOPICAL 2 TIMES DAILY
Status: DISCONTINUED | OUTPATIENT
Start: 2021-11-26 | End: 2021-12-02 | Stop reason: HOSPADM

## 2021-11-26 RX ADMIN — ATORVASTATIN CALCIUM 40 MG: 20 TABLET, FILM COATED ORAL at 08:11

## 2021-11-26 RX ADMIN — PIPERACILLIN SODIUM AND TAZOBACTAM SODIUM 4.5 G: 4; .5 INJECTION, POWDER, FOR SOLUTION INTRAVENOUS at 11:11

## 2021-11-26 RX ADMIN — CYANOCOBALAMIN TAB 1000 MCG 1000 MCG: 1000 TAB at 09:11

## 2021-11-26 RX ADMIN — MICONAZOLE NITRATE: 20 POWDER TOPICAL at 09:11

## 2021-11-26 RX ADMIN — Medication 400 MG: at 08:11

## 2021-11-26 RX ADMIN — POTASSIUM & SODIUM PHOSPHATES POWDER PACK 280-160-250 MG 1 PACKET: 280-160-250 PACK at 07:11

## 2021-11-26 RX ADMIN — POTASSIUM & SODIUM PHOSPHATES POWDER PACK 280-160-250 MG 1 PACKET: 280-160-250 PACK at 05:11

## 2021-11-26 RX ADMIN — OXYCODONE HYDROCHLORIDE 15 MG: 10 TABLET ORAL at 03:11

## 2021-11-26 RX ADMIN — PIPERACILLIN SODIUM AND TAZOBACTAM SODIUM 4.5 G: 4; .5 INJECTION, POWDER, FOR SOLUTION INTRAVENOUS at 06:11

## 2021-11-26 RX ADMIN — POTASSIUM & SODIUM PHOSPHATES POWDER PACK 280-160-250 MG 1 PACKET: 280-160-250 PACK at 08:11

## 2021-11-26 RX ADMIN — PIPERACILLIN SODIUM AND TAZOBACTAM SODIUM 4.5 G: 4; .5 INJECTION, POWDER, FOR SOLUTION INTRAVENOUS at 03:11

## 2021-11-26 RX ADMIN — AMLODIPINE BESYLATE 10 MG: 10 TABLET ORAL at 09:11

## 2021-11-26 RX ADMIN — OXYCODONE HYDROCHLORIDE 15 MG: 10 TABLET ORAL at 04:11

## 2021-11-26 RX ADMIN — POTASSIUM & SODIUM PHOSPHATES POWDER PACK 280-160-250 MG 1 PACKET: 280-160-250 PACK at 12:11

## 2021-11-26 RX ADMIN — METOPROLOL TARTRATE 25 MG: 25 TABLET, FILM COATED ORAL at 09:11

## 2021-11-26 RX ADMIN — METOPROLOL TARTRATE 25 MG: 25 TABLET, FILM COATED ORAL at 08:11

## 2021-11-27 PROCEDURE — 99233 PR SUBSEQUENT HOSPITAL CARE,LEVL III: ICD-10-PCS | Mod: HCNC,95,, | Performed by: INTERNAL MEDICINE

## 2021-11-27 PROCEDURE — 25000003 PHARM REV CODE 250: Mod: HCNC | Performed by: HOSPITALIST

## 2021-11-27 PROCEDURE — 12000002 HC ACUTE/MED SURGE SEMI-PRIVATE ROOM: Mod: HCNC

## 2021-11-27 PROCEDURE — 27000207 HC ISOLATION: Mod: HCNC

## 2021-11-27 PROCEDURE — 99233 SBSQ HOSP IP/OBS HIGH 50: CPT | Mod: HCNC,95,, | Performed by: INTERNAL MEDICINE

## 2021-11-27 PROCEDURE — 25000003 PHARM REV CODE 250: Mod: HCNC | Performed by: INTERNAL MEDICINE

## 2021-11-27 RX ORDER — AMOXICILLIN 250 MG
1 CAPSULE ORAL DAILY
Status: DISCONTINUED | OUTPATIENT
Start: 2021-11-27 | End: 2021-12-01

## 2021-11-27 RX ADMIN — POTASSIUM & SODIUM PHOSPHATES POWDER PACK 280-160-250 MG 1 PACKET: 280-160-250 PACK at 09:11

## 2021-11-27 RX ADMIN — POTASSIUM & SODIUM PHOSPHATES POWDER PACK 280-160-250 MG 1 PACKET: 280-160-250 PACK at 05:11

## 2021-11-27 RX ADMIN — Medication 400 MG: at 08:11

## 2021-11-27 RX ADMIN — METOPROLOL TARTRATE 25 MG: 25 TABLET, FILM COATED ORAL at 09:11

## 2021-11-27 RX ADMIN — LOSARTAN POTASSIUM 100 MG: 50 TABLET, FILM COATED ORAL at 09:11

## 2021-11-27 RX ADMIN — POTASSIUM & SODIUM PHOSPHATES POWDER PACK 280-160-250 MG 1 PACKET: 280-160-250 PACK at 12:11

## 2021-11-27 RX ADMIN — METOPROLOL TARTRATE 25 MG: 25 TABLET, FILM COATED ORAL at 08:11

## 2021-11-27 RX ADMIN — ATORVASTATIN CALCIUM 40 MG: 20 TABLET, FILM COATED ORAL at 08:11

## 2021-11-27 RX ADMIN — OXYCODONE HYDROCHLORIDE 15 MG: 10 TABLET ORAL at 12:11

## 2021-11-27 RX ADMIN — CYANOCOBALAMIN TAB 1000 MCG 1000 MCG: 1000 TAB at 09:11

## 2021-11-27 RX ADMIN — POTASSIUM & SODIUM PHOSPHATES POWDER PACK 280-160-250 MG 1 PACKET: 280-160-250 PACK at 08:11

## 2021-11-27 RX ADMIN — OXYCODONE HYDROCHLORIDE 15 MG: 10 TABLET ORAL at 05:11

## 2021-11-27 RX ADMIN — MICONAZOLE NITRATE: 20 POWDER TOPICAL at 08:11

## 2021-11-27 RX ADMIN — OXYCODONE HYDROCHLORIDE 15 MG: 10 TABLET ORAL at 09:11

## 2021-11-27 RX ADMIN — Medication 400 MG: at 09:11

## 2021-11-27 RX ADMIN — MICONAZOLE NITRATE: 20 POWDER TOPICAL at 12:11

## 2021-11-28 PROCEDURE — 99233 SBSQ HOSP IP/OBS HIGH 50: CPT | Mod: HCNC,95,, | Performed by: INTERNAL MEDICINE

## 2021-11-28 PROCEDURE — 12000002 HC ACUTE/MED SURGE SEMI-PRIVATE ROOM: Mod: HCNC

## 2021-11-28 PROCEDURE — 25000003 PHARM REV CODE 250: Mod: HCNC | Performed by: HOSPITALIST

## 2021-11-28 PROCEDURE — 99233 PR SUBSEQUENT HOSPITAL CARE,LEVL III: ICD-10-PCS | Mod: HCNC,95,, | Performed by: INTERNAL MEDICINE

## 2021-11-28 PROCEDURE — 27000207 HC ISOLATION: Mod: HCNC

## 2021-11-28 RX ADMIN — OXYCODONE HYDROCHLORIDE 15 MG: 10 TABLET ORAL at 06:11

## 2021-11-28 RX ADMIN — OXYCODONE HYDROCHLORIDE 15 MG: 10 TABLET ORAL at 01:11

## 2021-11-28 RX ADMIN — METOPROLOL TARTRATE 25 MG: 25 TABLET, FILM COATED ORAL at 09:11

## 2021-11-28 RX ADMIN — Medication 400 MG: at 09:11

## 2021-11-28 RX ADMIN — CYANOCOBALAMIN TAB 1000 MCG 1000 MCG: 1000 TAB at 09:11

## 2021-11-28 RX ADMIN — OXYCODONE HYDROCHLORIDE 15 MG: 10 TABLET ORAL at 09:11

## 2021-11-28 RX ADMIN — METOPROLOL TARTRATE 25 MG: 25 TABLET, FILM COATED ORAL at 08:11

## 2021-11-28 RX ADMIN — LOSARTAN POTASSIUM 100 MG: 50 TABLET, FILM COATED ORAL at 09:11

## 2021-11-28 RX ADMIN — MICONAZOLE NITRATE: 20 POWDER TOPICAL at 09:11

## 2021-11-28 RX ADMIN — ATORVASTATIN CALCIUM 40 MG: 20 TABLET, FILM COATED ORAL at 08:11

## 2021-11-28 RX ADMIN — Medication 400 MG: at 08:11

## 2021-11-29 LAB
ALBUMIN SERPL BCP-MCNC: 2.5 G/DL (ref 3.5–5.2)
ALP SERPL-CCNC: 261 U/L (ref 55–135)
ALT SERPL W/O P-5'-P-CCNC: 17 U/L (ref 10–44)
ANION GAP SERPL CALC-SCNC: 8 MMOL/L (ref 8–16)
AST SERPL-CCNC: 13 U/L (ref 10–40)
BASOPHILS # BLD AUTO: 0.04 K/UL (ref 0–0.2)
BASOPHILS NFR BLD: 0.7 % (ref 0–1.9)
BILIRUB SERPL-MCNC: 0.3 MG/DL (ref 0.1–1)
BUN SERPL-MCNC: 8 MG/DL (ref 8–23)
CALCIUM SERPL-MCNC: 9.1 MG/DL (ref 8.7–10.5)
CHLORIDE SERPL-SCNC: 107 MMOL/L (ref 95–110)
CO2 SERPL-SCNC: 26 MMOL/L (ref 23–29)
CREAT SERPL-MCNC: 0.8 MG/DL (ref 0.5–1.4)
DIFFERENTIAL METHOD: ABNORMAL
EOSINOPHIL # BLD AUTO: 0.2 K/UL (ref 0–0.5)
EOSINOPHIL NFR BLD: 3.9 % (ref 0–8)
ERYTHROCYTE [DISTWIDTH] IN BLOOD BY AUTOMATED COUNT: 13.5 % (ref 11.5–14.5)
EST. GFR  (AFRICAN AMERICAN): >60 ML/MIN/1.73 M^2
EST. GFR  (NON AFRICAN AMERICAN): >60 ML/MIN/1.73 M^2
GLUCOSE SERPL-MCNC: 107 MG/DL (ref 70–110)
HCT VFR BLD AUTO: 28.3 % (ref 37–48.5)
HGB BLD-MCNC: 8.6 G/DL (ref 12–16)
IMM GRANULOCYTES # BLD AUTO: 0.02 K/UL (ref 0–0.04)
IMM GRANULOCYTES NFR BLD AUTO: 0.4 % (ref 0–0.5)
LIPASE SERPL-CCNC: 10 U/L (ref 4–60)
LYMPHOCYTES # BLD AUTO: 1.4 K/UL (ref 1–4.8)
LYMPHOCYTES NFR BLD: 26.8 % (ref 18–48)
MAGNESIUM SERPL-MCNC: 1.7 MG/DL (ref 1.6–2.6)
MCH RBC QN AUTO: 26.6 PG (ref 27–31)
MCHC RBC AUTO-ENTMCNC: 30.4 G/DL (ref 32–36)
MCV RBC AUTO: 88 FL (ref 82–98)
MONOCYTES # BLD AUTO: 0.4 K/UL (ref 0.3–1)
MONOCYTES NFR BLD: 7.5 % (ref 4–15)
NEUTROPHILS # BLD AUTO: 3.2 K/UL (ref 1.8–7.7)
NEUTROPHILS NFR BLD: 60.7 % (ref 38–73)
NRBC BLD-RTO: 0 /100 WBC
PHOSPHATE SERPL-MCNC: 2.8 MG/DL (ref 2.7–4.5)
PLATELET # BLD AUTO: 322 K/UL (ref 150–450)
PMV BLD AUTO: 10.7 FL (ref 9.2–12.9)
POTASSIUM SERPL-SCNC: 4.3 MMOL/L (ref 3.5–5.1)
PROT SERPL-MCNC: 6 G/DL (ref 6–8.4)
RBC # BLD AUTO: 3.23 M/UL (ref 4–5.4)
SODIUM SERPL-SCNC: 141 MMOL/L (ref 136–145)
WBC # BLD AUTO: 5.34 K/UL (ref 3.9–12.7)

## 2021-11-29 PROCEDURE — 97530 THERAPEUTIC ACTIVITIES: CPT | Mod: HCNC

## 2021-11-29 PROCEDURE — 25000003 PHARM REV CODE 250: Mod: HCNC | Performed by: HOSPITALIST

## 2021-11-29 PROCEDURE — 97535 SELF CARE MNGMENT TRAINING: CPT | Mod: HCNC,CO

## 2021-11-29 PROCEDURE — 83690 ASSAY OF LIPASE: CPT | Mod: HCNC | Performed by: INTERNAL MEDICINE

## 2021-11-29 PROCEDURE — 25000003 PHARM REV CODE 250: Mod: HCNC | Performed by: INTERNAL MEDICINE

## 2021-11-29 PROCEDURE — 99233 PR SUBSEQUENT HOSPITAL CARE,LEVL III: ICD-10-PCS | Mod: HCNC,95,, | Performed by: INTERNAL MEDICINE

## 2021-11-29 PROCEDURE — 85025 COMPLETE CBC W/AUTO DIFF WBC: CPT | Mod: HCNC | Performed by: HOSPITALIST

## 2021-11-29 PROCEDURE — 27000207 HC ISOLATION: Mod: HCNC

## 2021-11-29 PROCEDURE — 80053 COMPREHEN METABOLIC PANEL: CPT | Mod: HCNC | Performed by: HOSPITALIST

## 2021-11-29 PROCEDURE — 99233 SBSQ HOSP IP/OBS HIGH 50: CPT | Mod: HCNC,95,, | Performed by: INTERNAL MEDICINE

## 2021-11-29 PROCEDURE — 84100 ASSAY OF PHOSPHORUS: CPT | Mod: HCNC | Performed by: INTERNAL MEDICINE

## 2021-11-29 PROCEDURE — 83735 ASSAY OF MAGNESIUM: CPT | Mod: HCNC | Performed by: HOSPITALIST

## 2021-11-29 PROCEDURE — 97116 GAIT TRAINING THERAPY: CPT | Mod: HCNC

## 2021-11-29 PROCEDURE — 36415 COLL VENOUS BLD VENIPUNCTURE: CPT | Mod: HCNC | Performed by: HOSPITALIST

## 2021-11-29 PROCEDURE — 12000002 HC ACUTE/MED SURGE SEMI-PRIVATE ROOM: Mod: HCNC

## 2021-11-29 RX ORDER — AMOXICILLIN 250 MG
1 CAPSULE ORAL DAILY
Status: CANCELLED | OUTPATIENT
Start: 2021-11-30

## 2021-11-29 RX ORDER — LANOLIN ALCOHOL/MO/W.PET/CERES
400 CREAM (GRAM) TOPICAL 2 TIMES DAILY
Status: CANCELLED | OUTPATIENT
Start: 2021-11-29

## 2021-11-29 RX ORDER — LANOLIN ALCOHOL/MO/W.PET/CERES
1000 CREAM (GRAM) TOPICAL DAILY
Status: CANCELLED | OUTPATIENT
Start: 2021-11-30

## 2021-11-29 RX ORDER — SODIUM CHLORIDE 0.9 % (FLUSH) 0.9 %
10 SYRINGE (ML) INJECTION EVERY 12 HOURS PRN
Status: CANCELLED | OUTPATIENT
Start: 2021-11-29

## 2021-11-29 RX ORDER — ACETAMINOPHEN 325 MG/1
650 TABLET ORAL EVERY 6 HOURS PRN
Status: CANCELLED | OUTPATIENT
Start: 2021-11-29

## 2021-11-29 RX ORDER — ATORVASTATIN CALCIUM 20 MG/1
40 TABLET, FILM COATED ORAL NIGHTLY
Status: CANCELLED | OUTPATIENT
Start: 2021-11-29

## 2021-11-29 RX ORDER — TALC
6 POWDER (GRAM) TOPICAL NIGHTLY PRN
Status: CANCELLED | OUTPATIENT
Start: 2021-11-29

## 2021-11-29 RX ORDER — MICONAZOLE NITRATE 2 %
POWDER (GRAM) TOPICAL 2 TIMES DAILY
Status: CANCELLED | OUTPATIENT
Start: 2021-11-29

## 2021-11-29 RX ORDER — CALCIUM CARBONATE 200(500)MG
500 TABLET,CHEWABLE ORAL 2 TIMES DAILY PRN
Status: CANCELLED | OUTPATIENT
Start: 2021-11-29

## 2021-11-29 RX ORDER — METOPROLOL TARTRATE 25 MG/1
25 TABLET, FILM COATED ORAL 2 TIMES DAILY
Status: CANCELLED | OUTPATIENT
Start: 2021-11-29

## 2021-11-29 RX ORDER — LOSARTAN POTASSIUM 50 MG/1
100 TABLET ORAL DAILY
Status: CANCELLED | OUTPATIENT
Start: 2021-11-30

## 2021-11-29 RX ORDER — MECLIZINE HYDROCHLORIDE 25 MG/1
25 TABLET ORAL 3 TIMES DAILY PRN
Status: CANCELLED | OUTPATIENT
Start: 2021-11-29

## 2021-11-29 RX ADMIN — LOSARTAN POTASSIUM 100 MG: 50 TABLET, FILM COATED ORAL at 09:11

## 2021-11-29 RX ADMIN — MICONAZOLE NITRATE: 20 POWDER TOPICAL at 09:11

## 2021-11-29 RX ADMIN — OXYCODONE HYDROCHLORIDE 15 MG: 10 TABLET ORAL at 03:11

## 2021-11-29 RX ADMIN — ATORVASTATIN CALCIUM 40 MG: 20 TABLET, FILM COATED ORAL at 09:11

## 2021-11-29 RX ADMIN — METOPROLOL TARTRATE 25 MG: 25 TABLET, FILM COATED ORAL at 09:11

## 2021-11-29 RX ADMIN — OXYCODONE HYDROCHLORIDE 15 MG: 10 TABLET ORAL at 09:11

## 2021-11-29 RX ADMIN — OXYCODONE HYDROCHLORIDE 15 MG: 10 TABLET ORAL at 05:11

## 2021-11-29 RX ADMIN — Medication 400 MG: at 09:11

## 2021-11-29 RX ADMIN — CYANOCOBALAMIN TAB 1000 MCG 1000 MCG: 1000 TAB at 09:11

## 2021-11-30 ENCOUNTER — EXTERNAL CHRONIC CARE MANAGEMENT (OUTPATIENT)
Dept: PRIMARY CARE CLINIC | Facility: CLINIC | Age: 71
End: 2021-11-30
Payer: MEDICARE

## 2021-11-30 PROCEDURE — 27000207 HC ISOLATION: Mod: HCNC

## 2021-11-30 PROCEDURE — 12000002 HC ACUTE/MED SURGE SEMI-PRIVATE ROOM: Mod: HCNC

## 2021-11-30 PROCEDURE — 99233 PR SUBSEQUENT HOSPITAL CARE,LEVL III: ICD-10-PCS | Mod: HCNC,95,, | Performed by: INTERNAL MEDICINE

## 2021-11-30 PROCEDURE — 25000003 PHARM REV CODE 250: Mod: HCNC | Performed by: INTERNAL MEDICINE

## 2021-11-30 PROCEDURE — 99490 CHRNC CARE MGMT STAFF 1ST 20: CPT | Mod: S$GLB,,, | Performed by: INTERNAL MEDICINE

## 2021-11-30 PROCEDURE — 99900035 HC TECH TIME PER 15 MIN (STAT): Mod: HCNC

## 2021-11-30 PROCEDURE — 25000003 PHARM REV CODE 250: Mod: HCNC | Performed by: HOSPITALIST

## 2021-11-30 PROCEDURE — 99233 SBSQ HOSP IP/OBS HIGH 50: CPT | Mod: HCNC,95,, | Performed by: INTERNAL MEDICINE

## 2021-11-30 PROCEDURE — 94761 N-INVAS EAR/PLS OXIMETRY MLT: CPT | Mod: HCNC

## 2021-11-30 PROCEDURE — 99490 PR CHRONIC CARE MGMT, 1ST 20 MIN: ICD-10-PCS | Mod: S$GLB,,, | Performed by: INTERNAL MEDICINE

## 2021-11-30 RX ADMIN — OXYCODONE HYDROCHLORIDE 15 MG: 10 TABLET ORAL at 03:11

## 2021-11-30 RX ADMIN — SENNOSIDES AND DOCUSATE SODIUM 1 TABLET: 50; 8.6 TABLET ORAL at 09:11

## 2021-11-30 RX ADMIN — Medication 400 MG: at 09:11

## 2021-11-30 RX ADMIN — OXYCODONE HYDROCHLORIDE 15 MG: 10 TABLET ORAL at 09:11

## 2021-11-30 RX ADMIN — MICONAZOLE NITRATE: 20 POWDER TOPICAL at 09:11

## 2021-11-30 RX ADMIN — CYANOCOBALAMIN TAB 1000 MCG 1000 MCG: 1000 TAB at 09:11

## 2021-11-30 RX ADMIN — METOPROLOL TARTRATE 25 MG: 25 TABLET, FILM COATED ORAL at 09:11

## 2021-11-30 RX ADMIN — ATORVASTATIN CALCIUM 40 MG: 20 TABLET, FILM COATED ORAL at 09:11

## 2021-11-30 RX ADMIN — LOSARTAN POTASSIUM 100 MG: 50 TABLET, FILM COATED ORAL at 09:11

## 2021-11-30 RX ADMIN — OXYCODONE HYDROCHLORIDE 15 MG: 10 TABLET ORAL at 12:11

## 2021-12-01 PROCEDURE — 25000003 PHARM REV CODE 250: Mod: HCNC | Performed by: HOSPITALIST

## 2021-12-01 PROCEDURE — 99233 PR SUBSEQUENT HOSPITAL CARE,LEVL III: ICD-10-PCS | Mod: HCNC,95,, | Performed by: INTERNAL MEDICINE

## 2021-12-01 PROCEDURE — 97116 GAIT TRAINING THERAPY: CPT | Mod: HCNC,CQ

## 2021-12-01 PROCEDURE — 25000003 PHARM REV CODE 250: Mod: HCNC | Performed by: INTERNAL MEDICINE

## 2021-12-01 PROCEDURE — 12000002 HC ACUTE/MED SURGE SEMI-PRIVATE ROOM: Mod: HCNC

## 2021-12-01 PROCEDURE — 97110 THERAPEUTIC EXERCISES: CPT | Mod: HCNC,CO

## 2021-12-01 PROCEDURE — 99233 SBSQ HOSP IP/OBS HIGH 50: CPT | Mod: HCNC,95,, | Performed by: INTERNAL MEDICINE

## 2021-12-01 PROCEDURE — 27000207 HC ISOLATION: Mod: HCNC

## 2021-12-01 PROCEDURE — 97535 SELF CARE MNGMENT TRAINING: CPT | Mod: HCNC,CO

## 2021-12-01 RX ORDER — POLYETHYLENE GLYCOL 3350 17 G/17G
17 POWDER, FOR SOLUTION ORAL 2 TIMES DAILY PRN
Status: DISCONTINUED | OUTPATIENT
Start: 2021-12-01 | End: 2021-12-02 | Stop reason: HOSPADM

## 2021-12-01 RX ORDER — AMOXICILLIN 250 MG
1 CAPSULE ORAL 2 TIMES DAILY PRN
Status: DISCONTINUED | OUTPATIENT
Start: 2021-12-01 | End: 2021-12-02

## 2021-12-01 RX ORDER — BISACODYL 10 MG
10 SUPPOSITORY, RECTAL RECTAL DAILY PRN
Status: DISCONTINUED | OUTPATIENT
Start: 2021-12-01 | End: 2021-12-02 | Stop reason: HOSPADM

## 2021-12-01 RX ORDER — DOCUSATE SODIUM 283 MG/5ML
1 LIQUID RECTAL DAILY PRN
Status: DISCONTINUED | OUTPATIENT
Start: 2021-12-01 | End: 2021-12-02 | Stop reason: HOSPADM

## 2021-12-01 RX ADMIN — OXYCODONE HYDROCHLORIDE 15 MG: 10 TABLET ORAL at 03:12

## 2021-12-01 RX ADMIN — MICONAZOLE NITRATE: 20 POWDER TOPICAL at 09:12

## 2021-12-01 RX ADMIN — Medication 400 MG: at 09:12

## 2021-12-01 RX ADMIN — SENNOSIDES AND DOCUSATE SODIUM 1 TABLET: 50; 8.6 TABLET ORAL at 03:12

## 2021-12-01 RX ADMIN — ATORVASTATIN CALCIUM 40 MG: 20 TABLET, FILM COATED ORAL at 09:12

## 2021-12-01 RX ADMIN — LOSARTAN POTASSIUM 100 MG: 50 TABLET, FILM COATED ORAL at 09:12

## 2021-12-01 RX ADMIN — METOPROLOL TARTRATE 25 MG: 25 TABLET, FILM COATED ORAL at 09:12

## 2021-12-01 RX ADMIN — SENNOSIDES AND DOCUSATE SODIUM 1 TABLET: 50; 8.6 TABLET ORAL at 06:12

## 2021-12-01 RX ADMIN — OXYCODONE HYDROCHLORIDE 15 MG: 10 TABLET ORAL at 09:12

## 2021-12-01 RX ADMIN — CYANOCOBALAMIN TAB 1000 MCG 1000 MCG: 1000 TAB at 09:12

## 2021-12-02 ENCOUNTER — HOSPITAL ENCOUNTER (INPATIENT)
Facility: HOSPITAL | Age: 71
LOS: 12 days | Discharge: HOME HEALTH | DRG: 445 | End: 2021-12-14
Attending: HOSPITALIST | Admitting: INTERNAL MEDICINE
Payer: MEDICARE

## 2021-12-02 VITALS
OXYGEN SATURATION: 95 % | HEIGHT: 66 IN | DIASTOLIC BLOOD PRESSURE: 70 MMHG | TEMPERATURE: 98 F | HEART RATE: 77 BPM | RESPIRATION RATE: 18 BRPM | SYSTOLIC BLOOD PRESSURE: 134 MMHG | WEIGHT: 176.56 LBS | BODY MASS INDEX: 28.38 KG/M2

## 2021-12-02 DIAGNOSIS — N39.0 URINARY TRACT INFECTION WITHOUT HEMATURIA, SITE UNSPECIFIED: ICD-10-CM

## 2021-12-02 DIAGNOSIS — C7B.00 METASTATIC CARCINOID TUMOR: Chronic | ICD-10-CM

## 2021-12-02 DIAGNOSIS — I1A.0 RESISTANT HYPERTENSION: ICD-10-CM

## 2021-12-02 DIAGNOSIS — G89.4 CHRONIC PAIN SYNDROME: ICD-10-CM

## 2021-12-02 DIAGNOSIS — C7A.8 NEUROENDOCRINE CARCINOMA, UNKNOWN PRIMARY SITE: Primary | ICD-10-CM

## 2021-12-02 PROCEDURE — 25000003 PHARM REV CODE 250: Mod: HCNC | Performed by: HOSPITALIST

## 2021-12-02 PROCEDURE — 97116 GAIT TRAINING THERAPY: CPT | Mod: HCNC,CQ

## 2021-12-02 PROCEDURE — 25000003 PHARM REV CODE 250: Mod: HCNC | Performed by: INTERNAL MEDICINE

## 2021-12-02 PROCEDURE — 99239 PR HOSPITAL DISCHARGE DAY,>30 MIN: ICD-10-PCS | Mod: HCNC,95,, | Performed by: INTERNAL MEDICINE

## 2021-12-02 PROCEDURE — 11000004 HC SNF PRIVATE: Mod: HCNC

## 2021-12-02 PROCEDURE — 99239 HOSP IP/OBS DSCHRG MGMT >30: CPT | Mod: HCNC,95,, | Performed by: INTERNAL MEDICINE

## 2021-12-02 PROCEDURE — 97530 THERAPEUTIC ACTIVITIES: CPT | Mod: HCNC,CQ

## 2021-12-02 RX ORDER — LACTULOSE 10 G/15ML
10 SOLUTION ORAL 3 TIMES DAILY PRN
Status: CANCELLED | OUTPATIENT
Start: 2021-12-02

## 2021-12-02 RX ORDER — POLYETHYLENE GLYCOL 3350 17 G/17G
17 POWDER, FOR SOLUTION ORAL 2 TIMES DAILY PRN
Status: DISCONTINUED | OUTPATIENT
Start: 2021-12-02 | End: 2021-12-14 | Stop reason: HOSPADM

## 2021-12-02 RX ORDER — MICONAZOLE NITRATE 2 %
POWDER (GRAM) TOPICAL 2 TIMES DAILY
Status: DISCONTINUED | OUTPATIENT
Start: 2021-12-02 | End: 2021-12-14 | Stop reason: HOSPADM

## 2021-12-02 RX ORDER — LANOLIN ALCOHOL/MO/W.PET/CERES
400 CREAM (GRAM) TOPICAL 2 TIMES DAILY
Status: DISCONTINUED | OUTPATIENT
Start: 2021-12-02 | End: 2021-12-14 | Stop reason: HOSPADM

## 2021-12-02 RX ORDER — LANOLIN ALCOHOL/MO/W.PET/CERES
1000 CREAM (GRAM) TOPICAL DAILY
Status: DISCONTINUED | OUTPATIENT
Start: 2021-12-03 | End: 2021-12-14 | Stop reason: HOSPADM

## 2021-12-02 RX ORDER — MECLIZINE HCL 12.5 MG 12.5 MG/1
25 TABLET ORAL 3 TIMES DAILY PRN
Status: DISCONTINUED | OUTPATIENT
Start: 2021-12-02 | End: 2021-12-14 | Stop reason: HOSPADM

## 2021-12-02 RX ORDER — LACTULOSE 10 G/15ML
10 SOLUTION ORAL 3 TIMES DAILY PRN
Status: DISCONTINUED | OUTPATIENT
Start: 2021-12-02 | End: 2021-12-02 | Stop reason: HOSPADM

## 2021-12-02 RX ORDER — LACTULOSE 10 G/15ML
10 SOLUTION ORAL 3 TIMES DAILY PRN
Status: DISCONTINUED | OUTPATIENT
Start: 2021-12-02 | End: 2021-12-14 | Stop reason: HOSPADM

## 2021-12-02 RX ORDER — BISACODYL 10 MG
10 SUPPOSITORY, RECTAL RECTAL DAILY PRN
Status: DISCONTINUED | OUTPATIENT
Start: 2021-12-02 | End: 2021-12-14 | Stop reason: HOSPADM

## 2021-12-02 RX ORDER — METOPROLOL TARTRATE 25 MG/1
25 TABLET, FILM COATED ORAL 2 TIMES DAILY
Status: DISCONTINUED | OUTPATIENT
Start: 2021-12-02 | End: 2021-12-14 | Stop reason: HOSPADM

## 2021-12-02 RX ORDER — AMOXICILLIN 250 MG
1 CAPSULE ORAL 2 TIMES DAILY
Status: DISCONTINUED | OUTPATIENT
Start: 2021-12-02 | End: 2021-12-02 | Stop reason: HOSPADM

## 2021-12-02 RX ORDER — TALC
6 POWDER (GRAM) TOPICAL NIGHTLY PRN
Status: DISCONTINUED | OUTPATIENT
Start: 2021-12-02 | End: 2021-12-14 | Stop reason: HOSPADM

## 2021-12-02 RX ORDER — SODIUM CHLORIDE 0.9 % (FLUSH) 0.9 %
10 SYRINGE (ML) INJECTION EVERY 12 HOURS PRN
Status: DISCONTINUED | OUTPATIENT
Start: 2021-12-02 | End: 2021-12-14 | Stop reason: HOSPADM

## 2021-12-02 RX ORDER — CALCIUM CARBONATE 200(500)MG
500 TABLET,CHEWABLE ORAL 2 TIMES DAILY PRN
Status: DISCONTINUED | OUTPATIENT
Start: 2021-12-02 | End: 2021-12-14 | Stop reason: HOSPADM

## 2021-12-02 RX ORDER — LOSARTAN POTASSIUM 50 MG/1
100 TABLET ORAL DAILY
Status: DISCONTINUED | OUTPATIENT
Start: 2021-12-03 | End: 2021-12-10

## 2021-12-02 RX ORDER — AMOXICILLIN 250 MG
1 CAPSULE ORAL 2 TIMES DAILY
Status: CANCELLED | OUTPATIENT
Start: 2021-12-02

## 2021-12-02 RX ORDER — MUPIROCIN 20 MG/G
OINTMENT TOPICAL 2 TIMES DAILY
Status: DISCONTINUED | OUTPATIENT
Start: 2021-12-02 | End: 2021-12-02 | Stop reason: HOSPADM

## 2021-12-02 RX ORDER — ATORVASTATIN CALCIUM 20 MG/1
40 TABLET, FILM COATED ORAL NIGHTLY
Status: DISCONTINUED | OUTPATIENT
Start: 2021-12-02 | End: 2021-12-14 | Stop reason: HOSPADM

## 2021-12-02 RX ORDER — ACETAMINOPHEN 325 MG/1
650 TABLET ORAL EVERY 6 HOURS PRN
Status: DISCONTINUED | OUTPATIENT
Start: 2021-12-02 | End: 2021-12-14 | Stop reason: HOSPADM

## 2021-12-02 RX ORDER — DOCUSATE SODIUM 283 MG/5ML
1 LIQUID RECTAL DAILY PRN
Status: DISCONTINUED | OUTPATIENT
Start: 2021-12-02 | End: 2021-12-14 | Stop reason: HOSPADM

## 2021-12-02 RX ORDER — AMOXICILLIN 250 MG
1 CAPSULE ORAL 2 TIMES DAILY
Status: DISCONTINUED | OUTPATIENT
Start: 2021-12-02 | End: 2021-12-14 | Stop reason: HOSPADM

## 2021-12-02 RX ORDER — DOCUSATE SODIUM 283 MG/5ML
1 LIQUID RECTAL DAILY PRN
Status: CANCELLED | OUTPATIENT
Start: 2021-12-02

## 2021-12-02 RX ORDER — POLYETHYLENE GLYCOL 3350 17 G/17G
17 POWDER, FOR SOLUTION ORAL 2 TIMES DAILY PRN
Status: CANCELLED | OUTPATIENT
Start: 2021-12-02

## 2021-12-02 RX ORDER — BISACODYL 10 MG
10 SUPPOSITORY, RECTAL RECTAL DAILY PRN
Status: CANCELLED | OUTPATIENT
Start: 2021-12-02

## 2021-12-02 RX ADMIN — Medication 400 MG: at 09:12

## 2021-12-02 RX ADMIN — SENNOSIDES AND DOCUSATE SODIUM 1 TABLET: 50; 8.6 TABLET ORAL at 11:12

## 2021-12-02 RX ADMIN — OXYCODONE HYDROCHLORIDE 15 MG: 10 TABLET ORAL at 09:12

## 2021-12-02 RX ADMIN — METOPROLOL TARTRATE 25 MG: 25 TABLET, FILM COATED ORAL at 09:12

## 2021-12-02 RX ADMIN — MICONAZOLE NITRATE: 20 POWDER TOPICAL at 09:12

## 2021-12-02 RX ADMIN — ATORVASTATIN CALCIUM 40 MG: 20 TABLET, FILM COATED ORAL at 09:12

## 2021-12-02 RX ADMIN — POLYETHYLENE GLYCOL 3350 17 G: 17 POWDER, FOR SOLUTION ORAL at 09:12

## 2021-12-02 RX ADMIN — LOSARTAN POTASSIUM 100 MG: 50 TABLET, FILM COATED ORAL at 09:12

## 2021-12-02 RX ADMIN — OXYCODONE HYDROCHLORIDE 15 MG: 10 TABLET ORAL at 12:12

## 2021-12-02 RX ADMIN — LACTULOSE 10 G: 10 SOLUTION ORAL at 04:12

## 2021-12-02 RX ADMIN — MUPIROCIN: 20 OINTMENT TOPICAL at 11:12

## 2021-12-02 RX ADMIN — OXYCODONE HYDROCHLORIDE 15 MG: 10 TABLET ORAL at 04:12

## 2021-12-02 RX ADMIN — SENNOSIDES AND DOCUSATE SODIUM 1 TABLET: 50; 8.6 TABLET ORAL at 09:12

## 2021-12-02 RX ADMIN — OXYCODONE HYDROCHLORIDE 15 MG: 10 TABLET ORAL at 10:12

## 2021-12-02 RX ADMIN — CYANOCOBALAMIN TAB 1000 MCG 1000 MCG: 1000 TAB at 09:12

## 2021-12-03 PROCEDURE — 97165 OT EVAL LOW COMPLEX 30 MIN: CPT | Mod: HCNC

## 2021-12-03 PROCEDURE — 11000004 HC SNF PRIVATE: Mod: HCNC

## 2021-12-03 PROCEDURE — 97162 PT EVAL MOD COMPLEX 30 MIN: CPT | Mod: HCNC

## 2021-12-03 PROCEDURE — 25000003 PHARM REV CODE 250: Mod: HCNC | Performed by: INTERNAL MEDICINE

## 2021-12-03 PROCEDURE — 97535 SELF CARE MNGMENT TRAINING: CPT | Mod: HCNC

## 2021-12-03 PROCEDURE — 97530 THERAPEUTIC ACTIVITIES: CPT | Mod: HCNC

## 2021-12-03 PROCEDURE — 97110 THERAPEUTIC EXERCISES: CPT | Mod: HCNC

## 2021-12-03 RX ADMIN — METOPROLOL TARTRATE 25 MG: 25 TABLET, FILM COATED ORAL at 09:12

## 2021-12-03 RX ADMIN — Medication 400 MG: at 09:12

## 2021-12-03 RX ADMIN — ATORVASTATIN CALCIUM 40 MG: 20 TABLET, FILM COATED ORAL at 09:12

## 2021-12-03 RX ADMIN — OXYCODONE HYDROCHLORIDE 15 MG: 10 TABLET ORAL at 01:12

## 2021-12-03 RX ADMIN — OXYCODONE HYDROCHLORIDE 15 MG: 10 TABLET ORAL at 05:12

## 2021-12-03 RX ADMIN — METOPROLOL TARTRATE 25 MG: 25 TABLET, FILM COATED ORAL at 08:12

## 2021-12-03 RX ADMIN — MICONAZOLE NITRATE: 20 POWDER TOPICAL at 09:12

## 2021-12-03 RX ADMIN — OXYCODONE HYDROCHLORIDE 15 MG: 10 TABLET ORAL at 09:12

## 2021-12-03 RX ADMIN — SENNOSIDES AND DOCUSATE SODIUM 1 TABLET: 50; 8.6 TABLET ORAL at 08:12

## 2021-12-03 RX ADMIN — Medication 400 MG: at 08:12

## 2021-12-03 RX ADMIN — SENNOSIDES AND DOCUSATE SODIUM 1 TABLET: 50; 8.6 TABLET ORAL at 09:12

## 2021-12-03 RX ADMIN — LOSARTAN POTASSIUM 100 MG: 50 TABLET, FILM COATED ORAL at 08:12

## 2021-12-03 RX ADMIN — CYANOCOBALAMIN TAB 1000 MCG 1000 MCG: 1000 TAB at 08:12

## 2021-12-03 RX ADMIN — Medication 6 MG: at 09:12

## 2021-12-04 PROCEDURE — 11000004 HC SNF PRIVATE: Mod: HCNC

## 2021-12-04 PROCEDURE — 97110 THERAPEUTIC EXERCISES: CPT | Mod: HCNC,CQ

## 2021-12-04 PROCEDURE — 25000003 PHARM REV CODE 250: Mod: HCNC | Performed by: INTERNAL MEDICINE

## 2021-12-04 PROCEDURE — 97116 GAIT TRAINING THERAPY: CPT | Mod: HCNC,CQ

## 2021-12-04 PROCEDURE — 97530 THERAPEUTIC ACTIVITIES: CPT | Mod: HCNC,CQ

## 2021-12-04 RX ADMIN — OXYCODONE HYDROCHLORIDE 15 MG: 10 TABLET ORAL at 02:12

## 2021-12-04 RX ADMIN — SENNOSIDES AND DOCUSATE SODIUM 1 TABLET: 50; 8.6 TABLET ORAL at 09:12

## 2021-12-04 RX ADMIN — ATORVASTATIN CALCIUM 40 MG: 20 TABLET, FILM COATED ORAL at 08:12

## 2021-12-04 RX ADMIN — METOPROLOL TARTRATE 25 MG: 25 TABLET, FILM COATED ORAL at 09:12

## 2021-12-04 RX ADMIN — OXYCODONE HYDROCHLORIDE 15 MG: 10 TABLET ORAL at 11:12

## 2021-12-04 RX ADMIN — OXYCODONE HYDROCHLORIDE 15 MG: 10 TABLET ORAL at 05:12

## 2021-12-04 RX ADMIN — Medication 400 MG: at 09:12

## 2021-12-04 RX ADMIN — Medication 400 MG: at 08:12

## 2021-12-04 RX ADMIN — CYANOCOBALAMIN TAB 1000 MCG 1000 MCG: 1000 TAB at 09:12

## 2021-12-05 PROCEDURE — 99306 PR NURSING FACILITY CARE, INIT, HIGH SEVERITY: ICD-10-PCS | Mod: HCNC,,, | Performed by: HOSPITALIST

## 2021-12-05 PROCEDURE — 25000003 PHARM REV CODE 250: Mod: HCNC | Performed by: INTERNAL MEDICINE

## 2021-12-05 PROCEDURE — 97110 THERAPEUTIC EXERCISES: CPT | Mod: HCNC,CO

## 2021-12-05 PROCEDURE — 25000003 PHARM REV CODE 250: Mod: HCNC | Performed by: HOSPITALIST

## 2021-12-05 PROCEDURE — 11000004 HC SNF PRIVATE: Mod: HCNC

## 2021-12-05 PROCEDURE — 97535 SELF CARE MNGMENT TRAINING: CPT | Mod: HCNC,CO

## 2021-12-05 PROCEDURE — 99306 1ST NF CARE HIGH MDM 50: CPT | Mod: HCNC,,, | Performed by: HOSPITALIST

## 2021-12-05 PROCEDURE — 97530 THERAPEUTIC ACTIVITIES: CPT | Mod: HCNC,CO

## 2021-12-05 RX ORDER — DICLOFENAC SODIUM 10 MG/G
4 GEL TOPICAL 2 TIMES DAILY
Status: DISCONTINUED | OUTPATIENT
Start: 2021-12-05 | End: 2021-12-06

## 2021-12-05 RX ADMIN — METOPROLOL TARTRATE 25 MG: 25 TABLET, FILM COATED ORAL at 09:12

## 2021-12-05 RX ADMIN — ATORVASTATIN CALCIUM 40 MG: 20 TABLET, FILM COATED ORAL at 09:12

## 2021-12-05 RX ADMIN — OXYCODONE HYDROCHLORIDE 15 MG: 10 TABLET ORAL at 12:12

## 2021-12-05 RX ADMIN — OXYCODONE HYDROCHLORIDE 15 MG: 10 TABLET ORAL at 09:12

## 2021-12-05 RX ADMIN — Medication 400 MG: at 09:12

## 2021-12-05 RX ADMIN — SENNOSIDES AND DOCUSATE SODIUM 1 TABLET: 50; 8.6 TABLET ORAL at 09:12

## 2021-12-05 RX ADMIN — DICLOFENAC 4 G: 10 GEL TOPICAL at 12:12

## 2021-12-05 RX ADMIN — OXYCODONE HYDROCHLORIDE 15 MG: 10 TABLET ORAL at 10:12

## 2021-12-05 RX ADMIN — OXYCODONE HYDROCHLORIDE 15 MG: 10 TABLET ORAL at 04:12

## 2021-12-05 RX ADMIN — LOSARTAN POTASSIUM 100 MG: 50 TABLET, FILM COATED ORAL at 09:12

## 2021-12-05 RX ADMIN — MICONAZOLE NITRATE: 20 POWDER TOPICAL at 09:12

## 2021-12-05 RX ADMIN — CYANOCOBALAMIN TAB 1000 MCG 1000 MCG: 1000 TAB at 09:12

## 2021-12-06 LAB
ALBUMIN SERPL BCP-MCNC: 2.8 G/DL (ref 3.5–5.2)
ALP SERPL-CCNC: 178 U/L (ref 55–135)
ALT SERPL W/O P-5'-P-CCNC: 11 U/L (ref 10–44)
ANION GAP SERPL CALC-SCNC: 8 MMOL/L (ref 8–16)
AST SERPL-CCNC: 15 U/L (ref 10–40)
BASOPHILS # BLD AUTO: 0.06 K/UL (ref 0–0.2)
BASOPHILS NFR BLD: 1.1 % (ref 0–1.9)
BILIRUB SERPL-MCNC: 0.4 MG/DL (ref 0.1–1)
BUN SERPL-MCNC: 22 MG/DL (ref 8–23)
CALCIUM SERPL-MCNC: 9.7 MG/DL (ref 8.7–10.5)
CHLORIDE SERPL-SCNC: 107 MMOL/L (ref 95–110)
CO2 SERPL-SCNC: 23 MMOL/L (ref 23–29)
CREAT SERPL-MCNC: 1 MG/DL (ref 0.5–1.4)
DIFFERENTIAL METHOD: ABNORMAL
EOSINOPHIL # BLD AUTO: 0.2 K/UL (ref 0–0.5)
EOSINOPHIL NFR BLD: 4.3 % (ref 0–8)
ERYTHROCYTE [DISTWIDTH] IN BLOOD BY AUTOMATED COUNT: 14.6 % (ref 11.5–14.5)
EST. GFR  (AFRICAN AMERICAN): >60 ML/MIN/1.73 M^2
EST. GFR  (NON AFRICAN AMERICAN): 57.6 ML/MIN/1.73 M^2
GLUCOSE SERPL-MCNC: 86 MG/DL (ref 70–110)
HCT VFR BLD AUTO: 28.8 % (ref 37–48.5)
HGB BLD-MCNC: 8.7 G/DL (ref 12–16)
IMM GRANULOCYTES # BLD AUTO: 0.01 K/UL (ref 0–0.04)
IMM GRANULOCYTES NFR BLD AUTO: 0.2 % (ref 0–0.5)
LYMPHOCYTES # BLD AUTO: 1.9 K/UL (ref 1–4.8)
LYMPHOCYTES NFR BLD: 35.8 % (ref 18–48)
MAGNESIUM SERPL-MCNC: 2 MG/DL (ref 1.6–2.6)
MCH RBC QN AUTO: 27 PG (ref 27–31)
MCHC RBC AUTO-ENTMCNC: 30.2 G/DL (ref 32–36)
MCV RBC AUTO: 89 FL (ref 82–98)
MONOCYTES # BLD AUTO: 0.5 K/UL (ref 0.3–1)
MONOCYTES NFR BLD: 9.2 % (ref 4–15)
NEUTROPHILS # BLD AUTO: 2.6 K/UL (ref 1.8–7.7)
NEUTROPHILS NFR BLD: 49.4 % (ref 38–73)
NRBC BLD-RTO: 0 /100 WBC
PHOSPHATE SERPL-MCNC: 3.9 MG/DL (ref 2.7–4.5)
PLATELET # BLD AUTO: 332 K/UL (ref 150–450)
PMV BLD AUTO: 10.5 FL (ref 9.2–12.9)
POTASSIUM SERPL-SCNC: 4.8 MMOL/L (ref 3.5–5.1)
PROT SERPL-MCNC: 6.9 G/DL (ref 6–8.4)
RBC # BLD AUTO: 3.22 M/UL (ref 4–5.4)
SODIUM SERPL-SCNC: 138 MMOL/L (ref 136–145)
WBC # BLD AUTO: 5.31 K/UL (ref 3.9–12.7)

## 2021-12-06 PROCEDURE — 84100 ASSAY OF PHOSPHORUS: CPT | Mod: HCNC | Performed by: INTERNAL MEDICINE

## 2021-12-06 PROCEDURE — 97116 GAIT TRAINING THERAPY: CPT | Mod: HCNC,CQ

## 2021-12-06 PROCEDURE — 25000003 PHARM REV CODE 250: Mod: HCNC | Performed by: INTERNAL MEDICINE

## 2021-12-06 PROCEDURE — 36415 COLL VENOUS BLD VENIPUNCTURE: CPT | Mod: HCNC | Performed by: INTERNAL MEDICINE

## 2021-12-06 PROCEDURE — 11000004 HC SNF PRIVATE: Mod: HCNC

## 2021-12-06 PROCEDURE — 85025 COMPLETE CBC W/AUTO DIFF WBC: CPT | Mod: HCNC | Performed by: INTERNAL MEDICINE

## 2021-12-06 PROCEDURE — 97530 THERAPEUTIC ACTIVITIES: CPT | Mod: HCNC,CO

## 2021-12-06 PROCEDURE — 83735 ASSAY OF MAGNESIUM: CPT | Mod: HCNC | Performed by: INTERNAL MEDICINE

## 2021-12-06 PROCEDURE — 63600175 PHARM REV CODE 636 W HCPCS: Mod: HCNC | Performed by: NURSE PRACTITIONER

## 2021-12-06 PROCEDURE — 80053 COMPREHEN METABOLIC PANEL: CPT | Mod: HCNC | Performed by: INTERNAL MEDICINE

## 2021-12-06 PROCEDURE — 97530 THERAPEUTIC ACTIVITIES: CPT | Mod: HCNC,CQ

## 2021-12-06 PROCEDURE — 97110 THERAPEUTIC EXERCISES: CPT | Mod: HCNC,CQ

## 2021-12-06 RX ORDER — ENOXAPARIN SODIUM 100 MG/ML
40 INJECTION SUBCUTANEOUS EVERY 24 HOURS
Status: DISCONTINUED | OUTPATIENT
Start: 2021-12-06 | End: 2021-12-14 | Stop reason: HOSPADM

## 2021-12-06 RX ADMIN — SENNOSIDES AND DOCUSATE SODIUM 1 TABLET: 50; 8.6 TABLET ORAL at 08:12

## 2021-12-06 RX ADMIN — METOPROLOL TARTRATE 25 MG: 25 TABLET, FILM COATED ORAL at 09:12

## 2021-12-06 RX ADMIN — OXYCODONE HYDROCHLORIDE 15 MG: 10 TABLET ORAL at 05:12

## 2021-12-06 RX ADMIN — SENNOSIDES AND DOCUSATE SODIUM 1 TABLET: 50; 8.6 TABLET ORAL at 09:12

## 2021-12-06 RX ADMIN — LOSARTAN POTASSIUM 100 MG: 50 TABLET, FILM COATED ORAL at 09:12

## 2021-12-06 RX ADMIN — ATORVASTATIN CALCIUM 40 MG: 20 TABLET, FILM COATED ORAL at 08:12

## 2021-12-06 RX ADMIN — Medication 400 MG: at 09:12

## 2021-12-06 RX ADMIN — OXYCODONE HYDROCHLORIDE 15 MG: 10 TABLET ORAL at 01:12

## 2021-12-06 RX ADMIN — METOPROLOL TARTRATE 25 MG: 25 TABLET, FILM COATED ORAL at 08:12

## 2021-12-06 RX ADMIN — OXYCODONE HYDROCHLORIDE 15 MG: 10 TABLET ORAL at 08:12

## 2021-12-06 RX ADMIN — DICLOFENAC 4 G: 10 GEL TOPICAL at 09:12

## 2021-12-06 RX ADMIN — Medication 6 MG: at 08:12

## 2021-12-06 RX ADMIN — MICONAZOLE NITRATE: 20 POWDER TOPICAL at 09:12

## 2021-12-06 RX ADMIN — Medication 400 MG: at 08:12

## 2021-12-06 RX ADMIN — ENOXAPARIN SODIUM 40 MG: 100 INJECTION SUBCUTANEOUS at 05:12

## 2021-12-06 RX ADMIN — CYANOCOBALAMIN TAB 1000 MCG 1000 MCG: 1000 TAB at 09:12

## 2021-12-07 PROCEDURE — 25000003 PHARM REV CODE 250: Mod: HCNC | Performed by: INTERNAL MEDICINE

## 2021-12-07 PROCEDURE — 63600175 PHARM REV CODE 636 W HCPCS: Mod: HCNC | Performed by: NURSE PRACTITIONER

## 2021-12-07 PROCEDURE — 97116 GAIT TRAINING THERAPY: CPT | Mod: HCNC,CQ

## 2021-12-07 PROCEDURE — 97110 THERAPEUTIC EXERCISES: CPT | Mod: HCNC,CQ

## 2021-12-07 PROCEDURE — 97535 SELF CARE MNGMENT TRAINING: CPT | Mod: HCNC,CO

## 2021-12-07 PROCEDURE — 11000004 HC SNF PRIVATE: Mod: HCNC

## 2021-12-07 PROCEDURE — 97530 THERAPEUTIC ACTIVITIES: CPT | Mod: HCNC,CO

## 2021-12-07 PROCEDURE — 25000003 PHARM REV CODE 250: Mod: HCNC | Performed by: NURSE PRACTITIONER

## 2021-12-07 PROCEDURE — 97530 THERAPEUTIC ACTIVITIES: CPT | Mod: HCNC,CQ

## 2021-12-07 PROCEDURE — 97110 THERAPEUTIC EXERCISES: CPT | Mod: HCNC,CO

## 2021-12-07 RX ORDER — GABAPENTIN 100 MG/1
100 CAPSULE ORAL NIGHTLY
Status: DISCONTINUED | OUTPATIENT
Start: 2021-12-07 | End: 2021-12-09

## 2021-12-07 RX ADMIN — LOSARTAN POTASSIUM 100 MG: 50 TABLET, FILM COATED ORAL at 09:12

## 2021-12-07 RX ADMIN — SENNOSIDES AND DOCUSATE SODIUM 1 TABLET: 50; 8.6 TABLET ORAL at 09:12

## 2021-12-07 RX ADMIN — MICONAZOLE NITRATE: 20 POWDER TOPICAL at 09:12

## 2021-12-07 RX ADMIN — METOPROLOL TARTRATE 25 MG: 25 TABLET, FILM COATED ORAL at 09:12

## 2021-12-07 RX ADMIN — MENTHOL, METHYL SALICYLATE: 10; 15 CREAM TOPICAL at 09:12

## 2021-12-07 RX ADMIN — ATORVASTATIN CALCIUM 40 MG: 20 TABLET, FILM COATED ORAL at 09:12

## 2021-12-07 RX ADMIN — GABAPENTIN 100 MG: 100 CAPSULE ORAL at 09:12

## 2021-12-07 RX ADMIN — OXYCODONE HYDROCHLORIDE 15 MG: 10 TABLET ORAL at 06:12

## 2021-12-07 RX ADMIN — OXYCODONE HYDROCHLORIDE 15 MG: 10 TABLET ORAL at 05:12

## 2021-12-07 RX ADMIN — OXYCODONE HYDROCHLORIDE 15 MG: 10 TABLET ORAL at 11:12

## 2021-12-07 RX ADMIN — ENOXAPARIN SODIUM 40 MG: 100 INJECTION SUBCUTANEOUS at 04:12

## 2021-12-07 RX ADMIN — MENTHOL, METHYL SALICYLATE: 10; 15 CREAM TOPICAL at 03:12

## 2021-12-07 RX ADMIN — Medication 400 MG: at 09:12

## 2021-12-07 RX ADMIN — CYANOCOBALAMIN TAB 1000 MCG 1000 MCG: 1000 TAB at 09:12

## 2021-12-08 PROCEDURE — 25000003 PHARM REV CODE 250: Mod: HCNC | Performed by: NURSE PRACTITIONER

## 2021-12-08 PROCEDURE — 25000003 PHARM REV CODE 250: Mod: HCNC | Performed by: INTERNAL MEDICINE

## 2021-12-08 PROCEDURE — 97530 THERAPEUTIC ACTIVITIES: CPT | Mod: HCNC,CQ

## 2021-12-08 PROCEDURE — 97535 SELF CARE MNGMENT TRAINING: CPT | Mod: HCNC,CO

## 2021-12-08 PROCEDURE — 97116 GAIT TRAINING THERAPY: CPT | Mod: HCNC,CQ

## 2021-12-08 PROCEDURE — 97110 THERAPEUTIC EXERCISES: CPT | Mod: HCNC,CQ

## 2021-12-08 PROCEDURE — 11000004 HC SNF PRIVATE: Mod: HCNC

## 2021-12-08 PROCEDURE — 63600175 PHARM REV CODE 636 W HCPCS: Mod: HCNC | Performed by: NURSE PRACTITIONER

## 2021-12-08 PROCEDURE — 97110 THERAPEUTIC EXERCISES: CPT | Mod: HCNC,CO

## 2021-12-08 RX ADMIN — METOPROLOL TARTRATE 25 MG: 25 TABLET, FILM COATED ORAL at 09:12

## 2021-12-08 RX ADMIN — ENOXAPARIN SODIUM 40 MG: 100 INJECTION SUBCUTANEOUS at 05:12

## 2021-12-08 RX ADMIN — CYANOCOBALAMIN TAB 1000 MCG 1000 MCG: 1000 TAB at 09:12

## 2021-12-08 RX ADMIN — Medication 400 MG: at 09:12

## 2021-12-08 RX ADMIN — OXYCODONE HYDROCHLORIDE 15 MG: 10 TABLET ORAL at 04:12

## 2021-12-08 RX ADMIN — MENTHOL, METHYL SALICYLATE: 10; 15 CREAM TOPICAL at 09:12

## 2021-12-08 RX ADMIN — ATORVASTATIN CALCIUM 40 MG: 20 TABLET, FILM COATED ORAL at 09:12

## 2021-12-08 RX ADMIN — SENNOSIDES AND DOCUSATE SODIUM 1 TABLET: 50; 8.6 TABLET ORAL at 09:12

## 2021-12-08 RX ADMIN — MICONAZOLE NITRATE: 20 POWDER TOPICAL at 09:12

## 2021-12-08 RX ADMIN — OXYCODONE HYDROCHLORIDE 15 MG: 10 TABLET ORAL at 05:12

## 2021-12-08 RX ADMIN — OXYCODONE HYDROCHLORIDE 15 MG: 10 TABLET ORAL at 10:12

## 2021-12-08 RX ADMIN — MENTHOL, METHYL SALICYLATE: 10; 15 CREAM TOPICAL at 03:12

## 2021-12-08 RX ADMIN — GABAPENTIN 100 MG: 100 CAPSULE ORAL at 09:12

## 2021-12-08 RX ADMIN — LOSARTAN POTASSIUM 100 MG: 50 TABLET, FILM COATED ORAL at 09:12

## 2021-12-09 PROCEDURE — 97116 GAIT TRAINING THERAPY: CPT | Mod: HCNC

## 2021-12-09 PROCEDURE — 11000004 HC SNF PRIVATE: Mod: HCNC

## 2021-12-09 PROCEDURE — 63600175 PHARM REV CODE 636 W HCPCS: Mod: HCNC | Performed by: NURSE PRACTITIONER

## 2021-12-09 PROCEDURE — 25000003 PHARM REV CODE 250: Mod: HCNC | Performed by: INTERNAL MEDICINE

## 2021-12-09 PROCEDURE — 97535 SELF CARE MNGMENT TRAINING: CPT | Mod: HCNC

## 2021-12-09 PROCEDURE — 97110 THERAPEUTIC EXERCISES: CPT | Mod: HCNC

## 2021-12-09 PROCEDURE — 97530 THERAPEUTIC ACTIVITIES: CPT | Mod: HCNC

## 2021-12-09 RX ADMIN — ATORVASTATIN CALCIUM 40 MG: 20 TABLET, FILM COATED ORAL at 08:12

## 2021-12-09 RX ADMIN — OXYCODONE HYDROCHLORIDE 15 MG: 10 TABLET ORAL at 04:12

## 2021-12-09 RX ADMIN — METOPROLOL TARTRATE 25 MG: 25 TABLET, FILM COATED ORAL at 10:12

## 2021-12-09 RX ADMIN — MICONAZOLE NITRATE: 20 POWDER TOPICAL at 08:12

## 2021-12-09 RX ADMIN — Medication 400 MG: at 08:12

## 2021-12-09 RX ADMIN — ENOXAPARIN SODIUM 40 MG: 100 INJECTION SUBCUTANEOUS at 04:12

## 2021-12-09 RX ADMIN — OXYCODONE HYDROCHLORIDE 15 MG: 10 TABLET ORAL at 02:12

## 2021-12-09 RX ADMIN — Medication 400 MG: at 10:12

## 2021-12-09 RX ADMIN — CYANOCOBALAMIN TAB 1000 MCG 1000 MCG: 1000 TAB at 10:12

## 2021-12-09 RX ADMIN — LOSARTAN POTASSIUM 100 MG: 50 TABLET, FILM COATED ORAL at 10:12

## 2021-12-09 RX ADMIN — MENTHOL, METHYL SALICYLATE: 10; 15 CREAM TOPICAL at 10:12

## 2021-12-09 RX ADMIN — MICONAZOLE NITRATE: 20 POWDER TOPICAL at 09:12

## 2021-12-09 RX ADMIN — METOPROLOL TARTRATE 25 MG: 25 TABLET, FILM COATED ORAL at 08:12

## 2021-12-09 RX ADMIN — OXYCODONE HYDROCHLORIDE 15 MG: 10 TABLET ORAL at 10:12

## 2021-12-10 LAB
ALBUMIN SERPL BCP-MCNC: 2.7 G/DL (ref 3.5–5.2)
ALP SERPL-CCNC: 146 U/L (ref 55–135)
ALT SERPL W/O P-5'-P-CCNC: 8 U/L (ref 10–44)
ANION GAP SERPL CALC-SCNC: 10 MMOL/L (ref 8–16)
AST SERPL-CCNC: 14 U/L (ref 10–40)
BILIRUB SERPL-MCNC: 0.4 MG/DL (ref 0.1–1)
BUN SERPL-MCNC: 27 MG/DL (ref 8–23)
CALCIUM SERPL-MCNC: 9.4 MG/DL (ref 8.7–10.5)
CHLORIDE SERPL-SCNC: 111 MMOL/L (ref 95–110)
CO2 SERPL-SCNC: 17 MMOL/L (ref 23–29)
CREAT SERPL-MCNC: 1.1 MG/DL (ref 0.5–1.4)
EST. GFR  (AFRICAN AMERICAN): 59.2 ML/MIN/1.73 M^2
EST. GFR  (NON AFRICAN AMERICAN): 51.3 ML/MIN/1.73 M^2
GLUCOSE SERPL-MCNC: 96 MG/DL (ref 70–110)
MAGNESIUM SERPL-MCNC: 1.9 MG/DL (ref 1.6–2.6)
PHOSPHATE SERPL-MCNC: 3.5 MG/DL (ref 2.7–4.5)
POTASSIUM SERPL-SCNC: 4.8 MMOL/L (ref 3.5–5.1)
PROT SERPL-MCNC: 6.9 G/DL (ref 6–8.4)
SODIUM SERPL-SCNC: 138 MMOL/L (ref 136–145)

## 2021-12-10 PROCEDURE — 25000003 PHARM REV CODE 250: Mod: HCNC | Performed by: INTERNAL MEDICINE

## 2021-12-10 PROCEDURE — 97110 THERAPEUTIC EXERCISES: CPT | Mod: HCNC,CQ

## 2021-12-10 PROCEDURE — 97116 GAIT TRAINING THERAPY: CPT | Mod: HCNC,CQ

## 2021-12-10 PROCEDURE — 84100 ASSAY OF PHOSPHORUS: CPT | Mod: HCNC | Performed by: NURSE PRACTITIONER

## 2021-12-10 PROCEDURE — 80053 COMPREHEN METABOLIC PANEL: CPT | Mod: HCNC | Performed by: NURSE PRACTITIONER

## 2021-12-10 PROCEDURE — 25000003 PHARM REV CODE 250: Mod: HCNC | Performed by: NURSE PRACTITIONER

## 2021-12-10 PROCEDURE — 11000004 HC SNF PRIVATE: Mod: HCNC

## 2021-12-10 PROCEDURE — 63600175 PHARM REV CODE 636 W HCPCS: Mod: HCNC | Performed by: NURSE PRACTITIONER

## 2021-12-10 PROCEDURE — 97530 THERAPEUTIC ACTIVITIES: CPT | Mod: HCNC,CQ

## 2021-12-10 PROCEDURE — 97530 THERAPEUTIC ACTIVITIES: CPT | Mod: HCNC,CO

## 2021-12-10 PROCEDURE — 83735 ASSAY OF MAGNESIUM: CPT | Mod: HCNC | Performed by: NURSE PRACTITIONER

## 2021-12-10 PROCEDURE — 97535 SELF CARE MNGMENT TRAINING: CPT | Mod: HCNC,CO

## 2021-12-10 PROCEDURE — 36415 COLL VENOUS BLD VENIPUNCTURE: CPT | Mod: HCNC | Performed by: NURSE PRACTITIONER

## 2021-12-10 RX ORDER — SODIUM BICARBONATE 650 MG/1
650 TABLET ORAL 2 TIMES DAILY
Status: COMPLETED | OUTPATIENT
Start: 2021-12-10 | End: 2021-12-11

## 2021-12-10 RX ORDER — LOSARTAN POTASSIUM 50 MG/1
50 TABLET ORAL DAILY
Status: DISCONTINUED | OUTPATIENT
Start: 2021-12-11 | End: 2021-12-14 | Stop reason: HOSPADM

## 2021-12-10 RX ORDER — TALC
6 POWDER (GRAM) TOPICAL NIGHTLY PRN
Refills: 0 | COMMUNITY
Start: 2021-12-10 | End: 2022-02-02

## 2021-12-10 RX ORDER — METHOCARBAMOL 500 MG/1
500 TABLET, FILM COATED ORAL 4 TIMES DAILY PRN
Status: DISCONTINUED | OUTPATIENT
Start: 2021-12-10 | End: 2021-12-14 | Stop reason: HOSPADM

## 2021-12-10 RX ORDER — LIDOCAINE 50 MG/G
OINTMENT TOPICAL
Status: DISCONTINUED | OUTPATIENT
Start: 2021-12-10 | End: 2021-12-14 | Stop reason: HOSPADM

## 2021-12-10 RX ORDER — POLYETHYLENE GLYCOL 3350 17 G/17G
17 POWDER, FOR SOLUTION ORAL 2 TIMES DAILY PRN
Refills: 0 | COMMUNITY
Start: 2021-12-10 | End: 2022-02-02

## 2021-12-10 RX ADMIN — OXYCODONE HYDROCHLORIDE 15 MG: 10 TABLET ORAL at 03:12

## 2021-12-10 RX ADMIN — OXYCODONE HYDROCHLORIDE 15 MG: 10 TABLET ORAL at 10:12

## 2021-12-10 RX ADMIN — MICONAZOLE NITRATE: 20 POWDER TOPICAL at 08:12

## 2021-12-10 RX ADMIN — Medication 400 MG: at 08:12

## 2021-12-10 RX ADMIN — SODIUM BICARBONATE 650 MG TABLET 650 MG: at 10:12

## 2021-12-10 RX ADMIN — ENOXAPARIN SODIUM 40 MG: 100 INJECTION SUBCUTANEOUS at 04:12

## 2021-12-10 RX ADMIN — CYANOCOBALAMIN TAB 1000 MCG 1000 MCG: 1000 TAB at 08:12

## 2021-12-10 RX ADMIN — LOSARTAN POTASSIUM 100 MG: 50 TABLET, FILM COATED ORAL at 08:12

## 2021-12-10 RX ADMIN — MICONAZOLE NITRATE: 20 POWDER TOPICAL at 10:12

## 2021-12-10 RX ADMIN — OXYCODONE HYDROCHLORIDE 15 MG: 10 TABLET ORAL at 04:12

## 2021-12-10 RX ADMIN — METOPROLOL TARTRATE 25 MG: 25 TABLET, FILM COATED ORAL at 08:12

## 2021-12-10 RX ADMIN — SENNOSIDES AND DOCUSATE SODIUM 1 TABLET: 50; 8.6 TABLET ORAL at 08:12

## 2021-12-10 RX ADMIN — MENTHOL, METHYL SALICYLATE: 10; 15 CREAM TOPICAL at 09:12

## 2021-12-10 RX ADMIN — ATORVASTATIN CALCIUM 40 MG: 20 TABLET, FILM COATED ORAL at 10:12

## 2021-12-10 RX ADMIN — METOPROLOL TARTRATE 25 MG: 25 TABLET, FILM COATED ORAL at 10:12

## 2021-12-10 RX ADMIN — Medication 400 MG: at 10:12

## 2021-12-11 PROCEDURE — 11000004 HC SNF PRIVATE: Mod: HCNC

## 2021-12-11 PROCEDURE — 25000003 PHARM REV CODE 250: Mod: HCNC | Performed by: NURSE PRACTITIONER

## 2021-12-11 PROCEDURE — 25000003 PHARM REV CODE 250: Mod: HCNC | Performed by: INTERNAL MEDICINE

## 2021-12-11 PROCEDURE — 97110 THERAPEUTIC EXERCISES: CPT | Mod: HCNC,CQ

## 2021-12-11 PROCEDURE — 97530 THERAPEUTIC ACTIVITIES: CPT | Mod: HCNC,CQ

## 2021-12-11 PROCEDURE — 97116 GAIT TRAINING THERAPY: CPT | Mod: HCNC,CQ

## 2021-12-11 PROCEDURE — 63600175 PHARM REV CODE 636 W HCPCS: Mod: HCNC | Performed by: NURSE PRACTITIONER

## 2021-12-11 RX ADMIN — Medication 400 MG: at 11:12

## 2021-12-11 RX ADMIN — LOSARTAN POTASSIUM 50 MG: 50 TABLET, FILM COATED ORAL at 11:12

## 2021-12-11 RX ADMIN — METOPROLOL TARTRATE 25 MG: 25 TABLET, FILM COATED ORAL at 11:12

## 2021-12-11 RX ADMIN — ENOXAPARIN SODIUM 40 MG: 100 INJECTION SUBCUTANEOUS at 06:12

## 2021-12-11 RX ADMIN — OXYCODONE HYDROCHLORIDE 15 MG: 10 TABLET ORAL at 09:12

## 2021-12-11 RX ADMIN — OXYCODONE HYDROCHLORIDE 15 MG: 10 TABLET ORAL at 11:12

## 2021-12-11 RX ADMIN — OXYCODONE HYDROCHLORIDE 15 MG: 10 TABLET ORAL at 04:12

## 2021-12-11 RX ADMIN — SODIUM BICARBONATE 650 MG TABLET 650 MG: at 11:12

## 2021-12-11 RX ADMIN — SENNOSIDES AND DOCUSATE SODIUM 1 TABLET: 50; 8.6 TABLET ORAL at 11:12

## 2021-12-11 RX ADMIN — SENNOSIDES AND DOCUSATE SODIUM 1 TABLET: 50; 8.6 TABLET ORAL at 09:12

## 2021-12-11 RX ADMIN — CYANOCOBALAMIN TAB 1000 MCG 1000 MCG: 1000 TAB at 11:12

## 2021-12-11 RX ADMIN — ATORVASTATIN CALCIUM 40 MG: 20 TABLET, FILM COATED ORAL at 09:12

## 2021-12-11 RX ADMIN — METOPROLOL TARTRATE 25 MG: 25 TABLET, FILM COATED ORAL at 09:12

## 2021-12-11 RX ADMIN — Medication 400 MG: at 09:12

## 2021-12-11 RX ADMIN — MICONAZOLE NITRATE: 20 POWDER TOPICAL at 11:12

## 2021-12-12 PROCEDURE — 25000003 PHARM REV CODE 250: Mod: HCNC | Performed by: NURSE PRACTITIONER

## 2021-12-12 PROCEDURE — 25000003 PHARM REV CODE 250: Mod: HCNC | Performed by: INTERNAL MEDICINE

## 2021-12-12 PROCEDURE — 11000004 HC SNF PRIVATE: Mod: HCNC

## 2021-12-12 PROCEDURE — 63600175 PHARM REV CODE 636 W HCPCS: Mod: HCNC | Performed by: NURSE PRACTITIONER

## 2021-12-12 RX ADMIN — ATORVASTATIN CALCIUM 40 MG: 20 TABLET, FILM COATED ORAL at 09:12

## 2021-12-12 RX ADMIN — SENNOSIDES AND DOCUSATE SODIUM 1 TABLET: 50; 8.6 TABLET ORAL at 09:12

## 2021-12-12 RX ADMIN — MICONAZOLE NITRATE: 20 POWDER TOPICAL at 08:12

## 2021-12-12 RX ADMIN — ENOXAPARIN SODIUM 40 MG: 100 INJECTION SUBCUTANEOUS at 05:12

## 2021-12-12 RX ADMIN — METOPROLOL TARTRATE 25 MG: 25 TABLET, FILM COATED ORAL at 09:12

## 2021-12-12 RX ADMIN — METOPROLOL TARTRATE 25 MG: 25 TABLET, FILM COATED ORAL at 08:12

## 2021-12-12 RX ADMIN — Medication 400 MG: at 08:12

## 2021-12-12 RX ADMIN — Medication 400 MG: at 09:12

## 2021-12-12 RX ADMIN — OXYCODONE HYDROCHLORIDE 15 MG: 10 TABLET ORAL at 05:12

## 2021-12-12 RX ADMIN — SENNOSIDES AND DOCUSATE SODIUM 1 TABLET: 50; 8.6 TABLET ORAL at 08:12

## 2021-12-12 RX ADMIN — MICONAZOLE NITRATE: 20 POWDER TOPICAL at 09:12

## 2021-12-12 RX ADMIN — LOSARTAN POTASSIUM 50 MG: 50 TABLET, FILM COATED ORAL at 08:12

## 2021-12-12 RX ADMIN — CYANOCOBALAMIN TAB 1000 MCG 1000 MCG: 1000 TAB at 08:12

## 2021-12-12 RX ADMIN — OXYCODONE HYDROCHLORIDE 15 MG: 10 TABLET ORAL at 11:12

## 2021-12-13 PROCEDURE — 63600175 PHARM REV CODE 636 W HCPCS: Mod: HCNC | Performed by: NURSE PRACTITIONER

## 2021-12-13 PROCEDURE — 25000003 PHARM REV CODE 250: Mod: HCNC | Performed by: INTERNAL MEDICINE

## 2021-12-13 PROCEDURE — 97110 THERAPEUTIC EXERCISES: CPT | Mod: HCNC

## 2021-12-13 PROCEDURE — 25000003 PHARM REV CODE 250: Mod: HCNC | Performed by: NURSE PRACTITIONER

## 2021-12-13 PROCEDURE — 97116 GAIT TRAINING THERAPY: CPT | Mod: HCNC

## 2021-12-13 PROCEDURE — 97530 THERAPEUTIC ACTIVITIES: CPT | Mod: HCNC,CO

## 2021-12-13 PROCEDURE — 97110 THERAPEUTIC EXERCISES: CPT | Mod: HCNC,CO

## 2021-12-13 PROCEDURE — 11000004 HC SNF PRIVATE: Mod: HCNC

## 2021-12-13 PROCEDURE — 97535 SELF CARE MNGMENT TRAINING: CPT | Mod: HCNC,CO

## 2021-12-13 RX ORDER — LOSARTAN POTASSIUM 50 MG/1
50 TABLET ORAL DAILY
Qty: 90 TABLET | Refills: 3 | Status: SHIPPED | OUTPATIENT
Start: 2021-12-14 | End: 2022-03-01 | Stop reason: DRUGHIGH

## 2021-12-13 RX ADMIN — ENOXAPARIN SODIUM 40 MG: 100 INJECTION SUBCUTANEOUS at 04:12

## 2021-12-13 RX ADMIN — SENNOSIDES AND DOCUSATE SODIUM 1 TABLET: 50; 8.6 TABLET ORAL at 08:12

## 2021-12-13 RX ADMIN — MICONAZOLE NITRATE: 20 POWDER TOPICAL at 09:12

## 2021-12-13 RX ADMIN — OXYCODONE HYDROCHLORIDE 15 MG: 10 TABLET ORAL at 03:12

## 2021-12-13 RX ADMIN — ATORVASTATIN CALCIUM 40 MG: 20 TABLET, FILM COATED ORAL at 08:12

## 2021-12-13 RX ADMIN — METOPROLOL TARTRATE 25 MG: 25 TABLET, FILM COATED ORAL at 08:12

## 2021-12-13 RX ADMIN — OXYCODONE HYDROCHLORIDE 15 MG: 10 TABLET ORAL at 01:12

## 2021-12-13 RX ADMIN — OXYCODONE HYDROCHLORIDE 15 MG: 10 TABLET ORAL at 08:12

## 2021-12-13 RX ADMIN — Medication 400 MG: at 08:12

## 2021-12-13 RX ADMIN — CYANOCOBALAMIN TAB 1000 MCG 1000 MCG: 1000 TAB at 08:12

## 2021-12-14 VITALS
DIASTOLIC BLOOD PRESSURE: 57 MMHG | HEIGHT: 66 IN | HEART RATE: 70 BPM | OXYGEN SATURATION: 99 % | SYSTOLIC BLOOD PRESSURE: 117 MMHG | RESPIRATION RATE: 18 BRPM | BODY MASS INDEX: 26.01 KG/M2 | WEIGHT: 161.81 LBS | TEMPERATURE: 99 F

## 2021-12-14 PROCEDURE — 25000003 PHARM REV CODE 250: Mod: HCNC | Performed by: INTERNAL MEDICINE

## 2021-12-14 PROCEDURE — 25000003 PHARM REV CODE 250: Mod: HCNC | Performed by: NURSE PRACTITIONER

## 2021-12-14 RX ADMIN — OXYCODONE HYDROCHLORIDE 15 MG: 10 TABLET ORAL at 02:12

## 2021-12-14 RX ADMIN — Medication 400 MG: at 09:12

## 2021-12-14 RX ADMIN — LOSARTAN POTASSIUM 50 MG: 50 TABLET, FILM COATED ORAL at 09:12

## 2021-12-14 RX ADMIN — METOPROLOL TARTRATE 25 MG: 25 TABLET, FILM COATED ORAL at 09:12

## 2021-12-14 RX ADMIN — MICONAZOLE NITRATE: 20 POWDER TOPICAL at 09:12

## 2021-12-14 RX ADMIN — OXYCODONE HYDROCHLORIDE 15 MG: 10 TABLET ORAL at 09:12

## 2021-12-14 RX ADMIN — SENNOSIDES AND DOCUSATE SODIUM 1 TABLET: 50; 8.6 TABLET ORAL at 09:12

## 2021-12-14 RX ADMIN — CYANOCOBALAMIN TAB 1000 MCG 1000 MCG: 1000 TAB at 09:12

## 2021-12-29 ENCOUNTER — HOSPITAL ENCOUNTER (EMERGENCY)
Facility: HOSPITAL | Age: 71
Discharge: HOME | End: 2021-12-29
Attending: EMERGENCY MEDICINE
Payer: MEDICARE

## 2021-12-29 VITALS
RESPIRATION RATE: 16 BRPM | WEIGHT: 164 LBS | SYSTOLIC BLOOD PRESSURE: 167 MMHG | OXYGEN SATURATION: 99 % | TEMPERATURE: 98 F | HEART RATE: 81 BPM | BODY MASS INDEX: 26.36 KG/M2 | HEIGHT: 66 IN | DIASTOLIC BLOOD PRESSURE: 74 MMHG

## 2021-12-29 DIAGNOSIS — G89.29 ABDOMINAL PAIN, CHRONIC, RIGHT LOWER QUADRANT: ICD-10-CM

## 2021-12-29 DIAGNOSIS — R51.9 ACUTE NONINTRACTABLE HEADACHE, UNSPECIFIED HEADACHE TYPE: Primary | ICD-10-CM

## 2021-12-29 DIAGNOSIS — R10.31 ABDOMINAL PAIN, CHRONIC, RIGHT LOWER QUADRANT: ICD-10-CM

## 2021-12-29 LAB
ALBUMIN SERPL BCP-MCNC: 3.2 G/DL (ref 3.5–5.2)
ALP SERPL-CCNC: 126 U/L (ref 55–135)
ALT SERPL W/O P-5'-P-CCNC: 8 U/L (ref 10–44)
ANION GAP SERPL CALC-SCNC: 12 MMOL/L (ref 8–16)
AST SERPL-CCNC: 17 U/L (ref 10–40)
BASOPHILS # BLD AUTO: 0.04 K/UL (ref 0–0.2)
BASOPHILS NFR BLD: 0.6 % (ref 0–1.9)
BILIRUB SERPL-MCNC: 0.3 MG/DL (ref 0.1–1)
BUN SERPL-MCNC: 18 MG/DL (ref 8–23)
CALCIUM SERPL-MCNC: 8.7 MG/DL (ref 8.7–10.5)
CHLORIDE SERPL-SCNC: 108 MMOL/L (ref 95–110)
CO2 SERPL-SCNC: 19 MMOL/L (ref 23–29)
CREAT SERPL-MCNC: 1 MG/DL (ref 0.5–1.4)
DIFFERENTIAL METHOD: ABNORMAL
EOSINOPHIL # BLD AUTO: 0.1 K/UL (ref 0–0.5)
EOSINOPHIL NFR BLD: 0.8 % (ref 0–8)
ERYTHROCYTE [DISTWIDTH] IN BLOOD BY AUTOMATED COUNT: 15.7 % (ref 11.5–14.5)
EST. GFR  (AFRICAN AMERICAN): >60 ML/MIN/1.73 M^2
EST. GFR  (NON AFRICAN AMERICAN): 58 ML/MIN/1.73 M^2
GLUCOSE SERPL-MCNC: 106 MG/DL (ref 70–110)
HCT VFR BLD AUTO: 28.2 % (ref 37–48.5)
HGB BLD-MCNC: 8.6 G/DL (ref 12–16)
IMM GRANULOCYTES # BLD AUTO: 0.06 K/UL (ref 0–0.04)
IMM GRANULOCYTES NFR BLD AUTO: 0.8 % (ref 0–0.5)
LYMPHOCYTES # BLD AUTO: 1.2 K/UL (ref 1–4.8)
LYMPHOCYTES NFR BLD: 17 % (ref 18–48)
MCH RBC QN AUTO: 27 PG (ref 27–31)
MCHC RBC AUTO-ENTMCNC: 30.5 G/DL (ref 32–36)
MCV RBC AUTO: 88 FL (ref 82–98)
MONOCYTES # BLD AUTO: 0.5 K/UL (ref 0.3–1)
MONOCYTES NFR BLD: 6.8 % (ref 4–15)
NEUTROPHILS # BLD AUTO: 5.2 K/UL (ref 1.8–7.7)
NEUTROPHILS NFR BLD: 74 % (ref 38–73)
NRBC BLD-RTO: 0 /100 WBC
PLATELET # BLD AUTO: 195 K/UL (ref 150–450)
PLATELET BLD QL SMEAR: ABNORMAL
PMV BLD AUTO: 11.4 FL (ref 9.2–12.9)
POTASSIUM SERPL-SCNC: 4.9 MMOL/L (ref 3.5–5.1)
PROT SERPL-MCNC: 7.1 G/DL (ref 6–8.4)
RBC # BLD AUTO: 3.19 M/UL (ref 4–5.4)
SODIUM SERPL-SCNC: 139 MMOL/L (ref 136–145)
WBC # BLD AUTO: 7.07 K/UL (ref 3.9–12.7)

## 2021-12-29 PROCEDURE — 25000003 PHARM REV CODE 250: Mod: HCNC | Performed by: EMERGENCY MEDICINE

## 2021-12-29 PROCEDURE — 85025 COMPLETE CBC W/AUTO DIFF WBC: CPT | Mod: HCNC | Performed by: EMERGENCY MEDICINE

## 2021-12-29 PROCEDURE — 99284 EMERGENCY DEPT VISIT MOD MDM: CPT | Mod: 25,HCNC

## 2021-12-29 PROCEDURE — 80053 COMPREHEN METABOLIC PANEL: CPT | Mod: HCNC | Performed by: EMERGENCY MEDICINE

## 2021-12-29 RX ORDER — PROCHLORPERAZINE EDISYLATE 5 MG/ML
10 INJECTION INTRAMUSCULAR; INTRAVENOUS
Status: DISCONTINUED | OUTPATIENT
Start: 2021-12-29 | End: 2021-12-29 | Stop reason: HOSPADM

## 2021-12-29 RX ORDER — OXYCODONE HYDROCHLORIDE 5 MG/1
15 TABLET ORAL
Status: COMPLETED | OUTPATIENT
Start: 2021-12-29 | End: 2021-12-29

## 2021-12-29 RX ORDER — SODIUM CHLORIDE 9 MG/ML
INJECTION, SOLUTION INTRAVENOUS
Status: DISCONTINUED | OUTPATIENT
Start: 2021-12-29 | End: 2021-12-29 | Stop reason: HOSPADM

## 2021-12-29 RX ADMIN — OXYCODONE 15 MG: 5 TABLET ORAL at 09:12

## 2021-12-29 NOTE — ED NOTES
Patient is being picked up via [facility]/ ADT 30, VS stable, Hydrocodone given for pain with adequate relief. All home meds are to be resumed. All personal belonging taken home.

## 2021-12-29 NOTE — ED NOTES
Received patient A/Ox4, VS stable, afebrile, free from respiratory distress, patient was assisted to the  Bathroom using her personal walker. Patient is a  Hard stick, RN was unable to place a patent PIV. Morning labs was drawn and sent, pending results. Safety in place.

## 2021-12-29 NOTE — ED PROVIDER NOTES
Encounter Date: 2021       History     Chief Complaint   Patient presents with    Headache     71y F ambulatory to ED via  EMS with c/o headache and nausea starting tonight     The history is provided by the patient.   Headache   This is a new problem. The current episode started today. The problem occurs constantly. The problem has been unchanged. The pain is located in the bilateral region. The pain does not radiate. The pain quality is not similar to prior headaches. Associated symptoms include nausea. Pertinent negatives include no abdominal pain. She has tried oral narcotics for the symptoms. Her past medical history is significant for cancer.     Review of patient's allergies indicates:   Allergen Reactions    Contrast media Hives, Itching and Swelling    Epinephrine Anaphylaxis     Can cause  a Carcinoid Crisis    Ibuprofen Hives, Itching and Swelling    Iodinated contrast media     Sulfa (sulfonamide antibiotics) Hives, Itching and Swelling     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 ENDO ([address] [place] [address]);  Service: Endoscopy;  Laterality: N/A;    EYE SURGERY      HYSTERECTOMY   5/1996    LITHOTRIPSY      LIVER BIOPSY  9/14    carcinoid    URETEROSCOPY Right 10/10/2019    Procedure: URETEROSCOPY;  Surgeon: Gen Isbell MD;  Location: [facility];  Service: Urology;  Laterality: Right;    UTERINE FIBROID SURGERY       Family History   Problem Relation Age of Onset    Cancer Mother         unknown    Alzheimer's disease Father     Stroke Sister     No Known Problems Son     No Known Problems Son     No Known Problems Son     No Known Problems Son     Kidney disease Neg Hx      Social History     Tobacco Use    Smoking status: Never Smoker    Smokeless tobacco: Never Used   Substance Use Topics    Alcohol use: No     Alcohol/week: 0.0 standard drinks    Drug use: No     Review of Systems   Gastrointestinal: Positive for nausea. Negative for abdominal pain.   Neurological: Positive for headaches.   All other systems reviewed and are negative.      Physical Exam     Initial Vitals [12/29/21 0410]   BP Pulse Resp Temp SpO2   (!) 144/66 66 18 98.3 °F (36.8 °C) 99 %      MAP       --         Physical Exam    Nursing note and vitals reviewed.  Constitutional: She appears well-developed and well-nourished. She is not diaphoretic. No distress.   HENT:   Head: Normocephalic and atraumatic.   Eyes: Conjunctivae and EOM are normal.   Neck: Neck supple.   Normal range of motion.  Cardiovascular: Normal rate, regular rhythm and intact distal pulses.   Pulmonary/Chest: No respiratory distress.   Musculoskeletal:         General: No edema. Normal range of motion.      Cervical back: Normal range of motion and neck supple.     Neurological: She is alert and oriented to person, place, and time. She has normal strength.   Skin: Skin is warm and dry.         ED Course   Procedures  Labs Reviewed   CBC W/ AUTO DIFFERENTIAL - Abnormal; Notable for the following components:       Result Value    RBC 3.19 (*)     Hemoglobin 8.6 (*)     Hematocrit 28.2 (*)     MCHC 30.5 (*)     RDW 15.7 (*)      Immature Granulocytes 0.8 (*)     Immature Grans (Abs) 0.06 (*)     Gran % 74.0 (*)     Lymph % 17.0 (*)     Platelet Estimate Clumped (*)     All other components within normal limits   COMPREHENSIVE METABOLIC PANEL - Abnormal; Notable for the following components:    CO2 19 (*)     Albumin 3.2 (*)     ALT 8 (*)     eGFR if non  58 (*)     All other components within normal limits          Imaging Results          CT Head Without Contrast (Final result)  Result time 12/29/21 06:06:12    Final result by Galdino Leon MD (12/29/21 06:06:12)                 Impression:      1. No acute intracranial findings.  2. Possible periapical lucency involving the most anterior left maxillary molar.  This could represent periapical abscess, though is incompletely visualized and characterized.      Electronically signed by: Galdino Leon  Date:    12/29/2021  Time:    06:06             Narrative:    EXAMINATION:  CT HEAD WITHOUT CONTRAST    CLINICAL HISTORY:  Headache, new or worsening (Age >= 50y);    TECHNIQUE:  Low dose axial images were obtained through the head.  Coronal and sagittal reformations were also performed. Contrast was not administered.    COMPARISON:  CT head performed 07/03/2021    FINDINGS:  Blood: No acute intracranial hemorrhage.    Parenchyma: No definite loss of gray-white differentiation to suggest acute or subacute transcortical infarct. Redemonstrated nonspecific white matter hypoattenuation, which may relate to sequela of chronic small vessel ischemic disease.  Remote infarct in the left rose marie josey.    Ventricles/Extra-axial spaces: No abnormal extra-axial fluid collection. Basal cisterns are patent.    Vessels: Atherosclerotic calcifications.    Orbits: Status post bilateral lens replacements.    Scalp: Unremarkable.    Skull: There are no depressed skull fractures or destructive bone lesions.    Sinuses and mastoids: Trace paranasal sinus mucosal thick    Other findings: Possible  periapical lucency involving the most anterior left maxillary molar.                                 Medications   oxyCODONE immediate release tablet 15 mg (15 mg Oral Given 12/29/21 0905)                          Clinical Impression:   Final diagnoses:  [R51.9] Acute nonintractable headache, unspecified headache type (Primary)  [R10.31, G89.29] Abdominal pain, chronic, right lower quadrant          ED Disposition Condition    Discharge Stable        ED Prescriptions     None        Follow-up Information     Follow up With Specialties Details Why Contact Info    Yasir Myers Jr., MD Family Medicine Schedule an appointment as soon as possible for a visit   [address] [facility]  [address]   [facility] [address]  [phone]      Chris Herbert MD General Surgery Schedule an appointment as soon as possible for a visit   [address] [facility]  [address]  [facility] [id number] [phone]             Guy J. Lefort, MD  12/31/21 4536

## 2021-12-29 NOTE — ED NOTES
PANCHITO Christianson attempted to place a PIV using ultrasound method with no success, patient refuses to get stuck another time.

## 2021-12-31 ENCOUNTER — EXTERNAL CHRONIC CARE MANAGEMENT (OUTPATIENT)
Dept: PRIMARY CARE CLINIC | Facility: CLINIC | Age: 71
End: 2021-12-31
Payer: MEDICARE

## 2021-12-31 PROCEDURE — 99490 CHRNC CARE MGMT STAFF 1ST 20: CPT | Mod: S$GLB,,, | Performed by: INTERNAL MEDICINE

## 2021-12-31 PROCEDURE — 99490 PR CHRONIC CARE MGMT, 1ST 20 MIN: ICD-10-PCS | Mod: S$GLB,,, | Performed by: INTERNAL MEDICINE

## 2022-01-08 ENCOUNTER — NURSE TRIAGE (OUTPATIENT)
Dept: ADMINISTRATIVE | Facility: CLINIC | Age: 72
End: 2022-01-08
Payer: MEDICARE

## 2022-01-08 ENCOUNTER — HOSPITAL ENCOUNTER (EMERGENCY)
Facility: HOSPITAL | Age: 72
Discharge: HOME | End: 2022-01-09
Attending: EMERGENCY MEDICINE
Payer: MEDICARE

## 2022-01-08 VITALS
DIASTOLIC BLOOD PRESSURE: 60 MMHG | OXYGEN SATURATION: 97 % | WEIGHT: 164 LBS | BODY MASS INDEX: 26.47 KG/M2 | SYSTOLIC BLOOD PRESSURE: 126 MMHG | RESPIRATION RATE: 18 BRPM | HEART RATE: 75 BPM | TEMPERATURE: 98 F

## 2022-01-08 DIAGNOSIS — R51.9 NONINTRACTABLE HEADACHE, UNSPECIFIED CHRONICITY PATTERN, UNSPECIFIED HEADACHE TYPE: Primary | ICD-10-CM

## 2022-01-08 LAB
BASOPHILS # BLD AUTO: 0.03 K/UL (ref 0–0.2)
BASOPHILS NFR BLD: 0.6 % (ref 0–1.9)
BUN SERPL-MCNC: 15 MG/DL (ref 6–30)
CHLORIDE SERPL-SCNC: 110 MMOL/L (ref 95–110)
CREAT SERPL-MCNC: 0.8 MG/DL (ref 0.5–1.4)
CRP SERPL-MCNC: 3.4 MG/L (ref 0–8.2)
CTP QC/QA: YES
DIFFERENTIAL METHOD: ABNORMAL
EOSINOPHIL # BLD AUTO: 0.1 K/UL (ref 0–0.5)
EOSINOPHIL NFR BLD: 1.2 % (ref 0–8)
ERYTHROCYTE [DISTWIDTH] IN BLOOD BY AUTOMATED COUNT: 14.8 % (ref 11.5–14.5)
ERYTHROCYTE [SEDIMENTATION RATE] IN BLOOD BY WESTERGREN METHOD: 101 MM/HR (ref 0–36)
GLUCOSE SERPL-MCNC: 106 MG/DL (ref 70–110)
HCT VFR BLD AUTO: 34.1 % (ref 37–48.5)
HCT VFR BLD CALC: 33 %PCV (ref 36–54)
HGB BLD-MCNC: 10.5 G/DL (ref 12–16)
IMM GRANULOCYTES # BLD AUTO: 0.02 K/UL (ref 0–0.04)
IMM GRANULOCYTES NFR BLD AUTO: 0.4 % (ref 0–0.5)
LYMPHOCYTES # BLD AUTO: 1.3 K/UL (ref 1–4.8)
LYMPHOCYTES NFR BLD: 26.4 % (ref 18–48)
MCH RBC QN AUTO: 27.5 PG (ref 27–31)
MCHC RBC AUTO-ENTMCNC: 30.8 G/DL (ref 32–36)
MCV RBC AUTO: 89 FL (ref 82–98)
MONOCYTES # BLD AUTO: 0.4 K/UL (ref 0.3–1)
MONOCYTES NFR BLD: 7.3 % (ref 4–15)
NEUTROPHILS # BLD AUTO: 3.1 K/UL (ref 1.8–7.7)
NEUTROPHILS NFR BLD: 64.1 % (ref 38–73)
NRBC BLD-RTO: 0 /100 WBC
PLATELET # BLD AUTO: 240 K/UL (ref 150–450)
PMV BLD AUTO: 10.4 FL (ref 9.2–12.9)
POC IONIZED CALCIUM: 1.21 MMOL/L (ref 1.06–1.42)
POC TCO2 (MEASURED): 22 MMOL/L (ref 23–29)
POTASSIUM BLD-SCNC: 3.4 MMOL/L (ref 3.5–5.1)
RBC # BLD AUTO: 3.82 M/UL (ref 4–5.4)
SAMPLE: ABNORMAL
SARS-COV-2 RDRP RESP QL NAA+PROBE: NEGATIVE
SODIUM BLD-SCNC: 140 MMOL/L (ref 136–145)
WBC # BLD AUTO: 4.81 K/UL (ref 3.9–12.7)

## 2022-01-08 PROCEDURE — 85025 COMPLETE CBC W/AUTO DIFF WBC: CPT | Mod: HCNC | Performed by: EMERGENCY MEDICINE

## 2022-01-08 PROCEDURE — 96374 THER/PROPH/DIAG INJ IV PUSH: CPT | Mod: HCNC

## 2022-01-08 PROCEDURE — 99284 PR EMERGENCY DEPT VISIT,LEVEL IV: ICD-10-PCS | Mod: HCNC,CS,, | Performed by: EMERGENCY MEDICINE

## 2022-01-08 PROCEDURE — 86140 C-REACTIVE PROTEIN: CPT | Mod: HCNC | Performed by: EMERGENCY MEDICINE

## 2022-01-08 PROCEDURE — 63600175 PHARM REV CODE 636 W HCPCS: Mod: HCNC | Performed by: EMERGENCY MEDICINE

## 2022-01-08 PROCEDURE — 25000003 PHARM REV CODE 250: Mod: HCNC | Performed by: EMERGENCY MEDICINE

## 2022-01-08 PROCEDURE — 99285 EMERGENCY DEPT VISIT HI MDM: CPT | Mod: 25,HCNC

## 2022-01-08 PROCEDURE — 85652 RBC SED RATE AUTOMATED: CPT | Mod: HCNC | Performed by: EMERGENCY MEDICINE

## 2022-01-08 PROCEDURE — 99284 EMERGENCY DEPT VISIT MOD MDM: CPT | Mod: HCNC,CS,, | Performed by: EMERGENCY MEDICINE

## 2022-01-08 PROCEDURE — U0002 COVID-19 LAB TEST NON-CDC: HCPCS | Mod: HCNC | Performed by: EMERGENCY MEDICINE

## 2022-01-08 PROCEDURE — 80047 BASIC METABLC PNL IONIZED CA: CPT | Mod: HCNC

## 2022-01-08 RX ORDER — OXYCODONE HYDROCHLORIDE 5 MG/1
15 TABLET ORAL
Status: COMPLETED | OUTPATIENT
Start: 2022-01-08 | End: 2022-01-08

## 2022-01-08 RX ORDER — PROCHLORPERAZINE EDISYLATE 5 MG/ML
10 INJECTION INTRAMUSCULAR; INTRAVENOUS
Status: COMPLETED | OUTPATIENT
Start: 2022-01-08 | End: 2022-01-08

## 2022-01-08 RX ORDER — ACETAMINOPHEN 500 MG
1000 TABLET ORAL
Status: COMPLETED | OUTPATIENT
Start: 2022-01-08 | End: 2022-01-08

## 2022-01-08 RX ADMIN — PROCHLORPERAZINE EDISYLATE 10 MG: 5 INJECTION INTRAMUSCULAR; INTRAVENOUS at 09:01

## 2022-01-08 RX ADMIN — OXYCODONE 15 MG: 5 TABLET ORAL at 09:01

## 2022-01-08 RX ADMIN — ACETAMINOPHEN 1000 MG: 500 TABLET ORAL at 09:01

## 2022-01-08 NOTE — TELEPHONE ENCOUNTER
Spoke with patient she states since last week she has been having headache and vision changes since last week.  She takes losartan, metoprolol, and amlodipine. States dose was taken this morning.  B/P 160/80. She states her right eye has cloudiness on the cornea and some blurriness.  Patient also states she has pain in her eye.  Per protocol advised patient to go to ER and have another adult drive.  Patient verbalized understanding.     Reason for Disposition   [1] Systolic BP  >= 160 OR Diastolic >= 100 AND [2] cardiac or neurologic symptoms (e.g., chest pain, difficulty breathing, unsteady gait, blurred vision)    Additional Information   Negative: Difficult to awaken or acting confused (e.g., disoriented, slurred speech)   Negative: SEVERE difficulty breathing (e.g., struggling for each breath, speaks in single words)   Negative: [1] Weakness of the face, arm or leg on one side of the body AND [2] new-onset   Negative: [1] Numbness (i.e., loss of sensation) of the face, arm or leg on one side of the body AND [2] new-onset   Negative: [1] Chest pain lasts > 5 minutes AND [2] history of heart disease  (i.e., heart attack, bypass surgery, angina, angioplasty, CHF)   Negative: [1] Chest pain AND [2] took nitrogylcerin AND [3] pain was not relieved   Negative: Sounds like a life-threatening emergency to the triager    Protocols used: BLOOD PRESSURE - HIGH-A-

## 2022-01-09 NOTE — ED PROVIDER NOTES
Encounter Date: 1/8/2022    SCRIBE #1 NOTE: I, Pola Seals, am scribing for, and in the presence of,  Shirley Quiroz MD. I have scribed the following portions of the note - Other sections scribed: HPI, ROS.       History     Chief Complaint   Patient presents with    Headache     Activated EMS from home for headache. Has been seen multiple times for same. Pt is concerned her blood pressure is going to be too high and she is going to have another stroke.      Time patient was seen by the provider: 8:43 PM      The patient is a 71 y.o. female with co-morbidities including: HTN, kidney stones, colon cancer, secondary neuroendocrine tumor of liver(209.72), and cataract as well as a surgical history of abdominal surgery, cataract extraction, liver biopsy, and colon surgery who presents to the ED with a complaint of headaches. She states her headaches began about a week ago and have gradually gotten worse. She states the headaches are on her left side in the front of her head. She states she also has abdominal pain and feels nauseous. She states her nausea is causing her to gag a lot. She states she also has had visual disturbance. She states a family member gave her some eyedrops, but she still has 'darkness' in her vision. She states her right eye's vision feels worse than her left eye. She states she also has some neck pain. She states she has been having hot flashes and chills sometimes. She states she is worried her blood pressure may rise due to her headaches.  She states she took all her blood pressure medicine before arriving. She appeared in a wheelchair because she left her walker at home. She states she has used a walker to move around since her last chemotherapy session in July 2021. She states she lives alone. Denies vomiting.          The history is provided by the patient and medical records. No  was used.     Review of patient's allergies indicates:   Allergen Reactions     Contrast media Hives, Itching and Swelling    Epinephrine Anaphylaxis     Can cause  a Carcinoid Crisis    Ibuprofen Hives, Itching and Swelling    Iodinated contrast media     Sulfa (sulfonamide antibiotics) Hives, Itching and Swelling     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;  Service: Urology;  Laterality: Right;    ERCP N/A 2021    Procedure: ERCP (ENDOSCOPIC RETROGRADE CHOLANGIOPANCREATOGRAPHY);  Surgeon: Jayjay Rivera MD;  Location[address]);  Service: Endoscopy;  Laterality: N/A;    EYE SURGERY      HYSTERECTOMY  1996    LITHOTRIPSY      LIVER BIOPSY      carcinoid    URETEROSCOPY Right 10/10/2019    Procedure: URETEROSCOPY;  Surgeon: Gen Isbell MD;  Location: [facility] OR;  Service: Urology;  Laterality: Right;    UTERINE FIBROID SURGERY       Family History   Problem Relation Age of Onset    Cancer Mother         unknown    Alzheimer's disease Father     Stroke Sister     No Known Problems Son     No Known Problems Son     No Known Problems Son     No Known Problems Son     Kidney disease Neg Hx      Social History     Tobacco Use    Smoking status: Never Smoker    Smokeless tobacco: Never Used   Substance Use Topics    Alcohol use: No     Alcohol/week: 0.0 standard drinks    Drug use: No     Review of Systems    Constitutional: Positive for chills.        Hot flashes   Eyes: Positive for visual disturbance (bilateral 'darkness', mainly in right eye).   Gastrointestinal: Positive for abdominal pain and nausea. Negative for vomiting.   Musculoskeletal: Positive for neck pain.   Neurological: Positive for headaches.   Psychiatric/Behavioral: The patient is nervous/anxious.    All other systems reviewed and are negative.      Physical Exam     Initial Vitals [01/08/22 1954]   BP Pulse Resp Temp SpO2   124/74 86 18 98.1 °F (36.7 °C) 98 %      MAP       --         Physical Exam    Nursing note and vitals reviewed.  Constitutional: Vital signs are normal. She appears well-developed and well-nourished. She is not diaphoretic.  Non-toxic appearance. She does not appear ill. No distress.   HENT:   Head: Normocephalic and atraumatic.   Mouth/Throat: Oropharynx is clear and moist and mucous membranes are normal. Mucous membranes are not dry.   Eyes: Conjunctivae, EOM and lids are normal. Pupils are equal, round, and reactive to light. Right conjunctiva is not injected. Left conjunctiva is not injected. Right eye exhibits normal extraocular motion. Left eye exhibits normal extraocular motion.   Neck: Neck supple.   Normal range of motion.  Cardiovascular: Normal rate and regular rhythm.   Pulmonary/Chest: No respiratory distress.   Musculoskeletal:         General: Normal range of motion.      Cervical back: Normal range of motion and neck supple.     Neurological: She is alert and oriented to person, place, and time. She has normal strength. No cranial nerve deficit or sensory deficit. GCS score is 15. GCS eye subscore is 4. GCS verbal subscore is 5. GCS motor subscore is 6.   ? Decreased vision in R visual field?  No pronator drift  LE strength 4/5 B  Sensation intact all 4 extremities  Mild limb ataxia bilateral UE  Speech normal   Skin: Skin is dry and intact. No pallor.   Psychiatric: She has a normal mood and affect. Her speech  is normal and behavior is normal.         ED Course   Procedures  Labs Reviewed   CBC W/ AUTO DIFFERENTIAL - Abnormal; Notable for the following components:       Result Value    RBC 3.82 (*)     Hemoglobin 10.5 (*)     Hematocrit 34.1 (*)     MCHC 30.8 (*)     RDW 14.8 (*)     All other components within normal limits   SEDIMENTATION RATE - Abnormal; Notable for the following components:    Sed Rate 101 (*)     All other components within normal limits   ISTAT PROCEDURE - Abnormal; Notable for the following components:    POC Potassium 3.4 (*)     POC TCO2 (MEASURED) 22 (*)     POC Hematocrit 33 (*)     All other components within normal limits   C-REACTIVE PROTEIN   SARS-COV-2 RDRP GENE          Imaging Results          CT Head Without Contrast (Final result)  Result time 01/08/22 23:21:38    Final result by Matias Padron MD (01/08/22 23:21:38)                 Impression:      No CT evidence of acute intracranial abnormality.    Chronic ischemic changes.      Electronically signed by: Matias Padron MD  Date:    01/08/2022  Time:    23:21             Narrative:    EXAMINATION:  CT HEAD WITHOUT CONTRAST    CLINICAL HISTORY:  Headache, sudden, severe;    TECHNIQUE:  Low dose axial images were obtained through the head.  Coronal and sagittal reformations were also performed. Contrast was not administered.    COMPARISON:  CT head, 12/29/2021.    FINDINGS:  No evidence of acute territorial infarct, hemorrhage, mass effect, or midline shift.  Moderate patchy and confluent periventricular and subcortical white matter hypoattenuation suggestive of chronic microvascular ischemic change, similar to the prior study.  Remote appearing lacunar infarct in the josey similar to prior.    Ventricles are stable in size and configuration.  No hydrocephalus.    No displaced calvarial fracture.    Visualized paranasal sinuses and mastoid air cells are clear.                                 Medications   acetaminophen tablet 1,000  mg (1,000 mg Oral Given 1/8/22 2154)   prochlorperazine injection Soln 10 mg (10 mg Intravenous Given 1/8/22 2155)   oxyCODONE immediate release tablet 15 mg (15 mg Oral Given 1/8/22 2152)     Medical Decision Making:   History:   Old Medical Records: I decided to obtain old medical records.  Initial Assessment:   70 y/o f, complex PMH as above, here with HA x 1-2 weeks with ? Decreased vision x 2 days, no acute change in sx, no acute neurologic sx today    BP elevated at home? Though BP normal here  Neuro exam normal except for ? Abnormality to visual fields as noted  Differential Diagnosis:   SDH/ICH  Subacute stroke  Migraine  GCA  COVID  Clinical Tests:   Lab Tests: Ordered and Reviewed  Radiological Study: Ordered and Reviewed  ED Management:  Labs  CT head  Pain meds  Possible MRI brain  Other:   I have discussed this case with another health care provider.       <> Summary of the Discussion: On call ophthalmology  11:48 PM  Labs reviewed, ESR elevated though CRP normal  COVID negative.  CBC normal  CT head negative  Visual acuity normal  Pt reassessed, states that all sx have resolved.  Vision feels back to normal and no longer having a HA.  Visual fields reassessed and pt now able to fully participate in exam and all intact  Discussed case with on call ophthalmology given initial concern for GCA.  Suspicion for ESR elevation 2/2 neuroendocrine cancer.  CRP normal.  Given curent exam findings, lower suspicion for GCA.  He feels safe for d/c home with strict ED return precautions, evaluation in eye clini early this week.  He will expedite f/u          Scribe Attestation:   Scribe #1: I performed the above scribed service and the documentation accurately describes the services I performed. I attest to the accuracy of the note.        ED Course as of 01/09/22 1827   Sat Jan 08, 2022 2202 CRP: 3.4 [AS]   2202 Sed Rate(!): 101 [AS]   2202 WBC: 4.81 [AS]   2202 SARS-CoV-2 RNA, Amplification, Qual: Negative [AS]       ED Course User Index  [AS] Shirley Quiroz MD           I, Dr. Shirley Quiroz, personally performed the services described in this documentation. All medical record entries made by the scribe were at my direction and in my presence.  I have reviewed the chart and agree that the record reflects my personal performance and is accurate and complete. Shirley Quiroz MD.  11:07 PM 01/09/2022    Clinical Impression:   Final diagnoses:  [R51.9] Nonintractable headache, unspecified chronicity pattern, unspecified headache type (Primary)          ED Disposition Condition    Discharge Stable        ED Prescriptions     None        Follow-up Information     Follow up With Specialties Details Why Contact Info Additional Information    [facility] - [address] [place] [address] Ophthalmology Go in 2 days  [address] [facility] [address] [phone] Please arrive on the 10th floor for check-in.           Shirley Quiroz MD  01/09/22 0013

## 2022-01-09 NOTE — ED TRIAGE NOTES
Pt reports she has been having a ha all day, was watching tv and she reports she started with a ha today.

## 2022-01-10 ENCOUNTER — TELEPHONE (OUTPATIENT)
Dept: OPHTHALMOLOGY | Facility: CLINIC | Age: 72
End: 2022-01-10
Payer: MEDICARE

## 2022-01-10 NOTE — TELEPHONE ENCOUNTER
----- Message from Woody Chatman MD sent at 1/9/2022  8:48 AM CST -----  Please schedule ED follow up for dilated eye exam with general/optom in 1 week

## 2022-01-11 ENCOUNTER — TELEPHONE (OUTPATIENT)
Dept: OPHTHALMOLOGY | Facility: CLINIC | Age: 72
End: 2022-01-11
Payer: MEDICARE

## 2022-01-11 ENCOUNTER — OFFICE VISIT (OUTPATIENT)
Dept: FAMILY MEDICINE | Facility: CLINIC | Age: 72
End: 2022-01-11
Payer: MEDICARE

## 2022-01-11 VITALS
HEIGHT: 66 IN | WEIGHT: 169.38 LBS | BODY MASS INDEX: 27.22 KG/M2 | DIASTOLIC BLOOD PRESSURE: 82 MMHG | SYSTOLIC BLOOD PRESSURE: 144 MMHG

## 2022-01-11 DIAGNOSIS — H53.8 BLURRY VISION: Primary | ICD-10-CM

## 2022-01-11 DIAGNOSIS — I10 ESSENTIAL HYPERTENSION: ICD-10-CM

## 2022-01-11 DIAGNOSIS — R51.9 NONINTRACTABLE HEADACHE, UNSPECIFIED CHRONICITY PATTERN, UNSPECIFIED HEADACHE TYPE: ICD-10-CM

## 2022-01-11 PROCEDURE — 3008F BODY MASS INDEX DOCD: CPT | Mod: HCNC,CPTII,S$GLB, | Performed by: FAMILY MEDICINE

## 2022-01-11 PROCEDURE — 99999 PR PBB SHADOW E&M-EST. PATIENT-LVL IV: ICD-10-PCS | Mod: PBBFAC,HCNC,, | Performed by: FAMILY MEDICINE

## 2022-01-11 PROCEDURE — 99214 PR OFFICE/OUTPT VISIT, EST, LEVL IV, 30-39 MIN: ICD-10-PCS | Mod: HCNC,S$GLB,, | Performed by: FAMILY MEDICINE

## 2022-01-11 PROCEDURE — 99499 UNLISTED E&M SERVICE: CPT | Mod: S$GLB,,, | Performed by: FAMILY MEDICINE

## 2022-01-11 PROCEDURE — 3079F DIAST BP 80-89 MM HG: CPT | Mod: HCNC,CPTII,S$GLB, | Performed by: FAMILY MEDICINE

## 2022-01-11 PROCEDURE — 3288F FALL RISK ASSESSMENT DOCD: CPT | Mod: HCNC,CPTII,S$GLB, | Performed by: FAMILY MEDICINE

## 2022-01-11 PROCEDURE — 3077F PR MOST RECENT SYSTOLIC BLOOD PRESSURE >= 140 MM HG: ICD-10-PCS | Mod: HCNC,CPTII,S$GLB, | Performed by: FAMILY MEDICINE

## 2022-01-11 PROCEDURE — 99214 OFFICE O/P EST MOD 30 MIN: CPT | Mod: HCNC,S$GLB,, | Performed by: FAMILY MEDICINE

## 2022-01-11 PROCEDURE — 3077F SYST BP >= 140 MM HG: CPT | Mod: HCNC,CPTII,S$GLB, | Performed by: FAMILY MEDICINE

## 2022-01-11 PROCEDURE — 1160F PR REVIEW ALL MEDS BY PRESCRIBER/CLIN PHARMACIST DOCUMENTED: ICD-10-PCS | Mod: HCNC,CPTII,S$GLB, | Performed by: FAMILY MEDICINE

## 2022-01-11 PROCEDURE — 1160F RVW MEDS BY RX/DR IN RCRD: CPT | Mod: HCNC,CPTII,S$GLB, | Performed by: FAMILY MEDICINE

## 2022-01-11 PROCEDURE — 99999 PR PBB SHADOW E&M-EST. PATIENT-LVL IV: CPT | Mod: PBBFAC,HCNC,, | Performed by: FAMILY MEDICINE

## 2022-01-11 PROCEDURE — 1101F PT FALLS ASSESS-DOCD LE1/YR: CPT | Mod: HCNC,CPTII,S$GLB, | Performed by: FAMILY MEDICINE

## 2022-01-11 PROCEDURE — 3079F PR MOST RECENT DIASTOLIC BLOOD PRESSURE 80-89 MM HG: ICD-10-PCS | Mod: HCNC,CPTII,S$GLB, | Performed by: FAMILY MEDICINE

## 2022-01-11 PROCEDURE — 1159F MED LIST DOCD IN RCRD: CPT | Mod: HCNC,CPTII,S$GLB, | Performed by: FAMILY MEDICINE

## 2022-01-11 PROCEDURE — 99499 RISK ADDL DX/OHS AUDIT: ICD-10-PCS | Mod: S$GLB,,, | Performed by: FAMILY MEDICINE

## 2022-01-11 PROCEDURE — 1159F PR MEDICATION LIST DOCUMENTED IN MEDICAL RECORD: ICD-10-PCS | Mod: HCNC,CPTII,S$GLB, | Performed by: FAMILY MEDICINE

## 2022-01-11 PROCEDURE — 1101F PR PT FALLS ASSESS DOC 0-1 FALLS W/OUT INJ PAST YR: ICD-10-PCS | Mod: HCNC,CPTII,S$GLB, | Performed by: FAMILY MEDICINE

## 2022-01-11 PROCEDURE — 1111F DSCHRG MED/CURRENT MED MERGE: CPT | Mod: HCNC,CPTII,S$GLB, | Performed by: FAMILY MEDICINE

## 2022-01-11 PROCEDURE — 3288F PR FALLS RISK ASSESSMENT DOCUMENTED: ICD-10-PCS | Mod: HCNC,CPTII,S$GLB, | Performed by: FAMILY MEDICINE

## 2022-01-11 PROCEDURE — 3008F PR BODY MASS INDEX (BMI) DOCUMENTED: ICD-10-PCS | Mod: HCNC,CPTII,S$GLB, | Performed by: FAMILY MEDICINE

## 2022-01-11 PROCEDURE — 1111F PR DISCHARGE MEDS RECONCILED W/ CURRENT OUTPATIENT MED LIST: ICD-10-PCS | Mod: HCNC,CPTII,S$GLB, | Performed by: FAMILY MEDICINE

## 2022-01-11 NOTE — PROGRESS NOTES
[facility] Note    Chief Complaint      Chief Complaint   Patient presents with    Headache    Hypertension     History of Present Illness      aPtito Domingo is a 71 y.o. female who presents with:    Patient presents today with hospital follow-up from the ER from 2 days ago patient was sent to the ER and found to have elevated blood pressure.  She was also having headaches with on and off blurry vision.  She has an acute workup done in the ER nothing acute was found besides an elevated ESR and she was told to follow-up with ophthalmology immediately.  Patient unable to follow-up with ophthalmology secondary to ride issues.  She is still having the blurry vision her blood pressure seems to have improved.  She is still having slight headaches around the forehead in eyes.  She denies any fever nausea vomiting chest pain shortness of breath    Problem List Items Addressed This Visit        Cardiac/Vascular    Essential hypertension (Chronic)    Overview     bp well controlled on current            Other Visit Diagnoses     Blurry vision    -  Primary    Relevant Orders    Ambulatory referral/consult to Ophthalmology    Nonintractable headache, unspecified chronicity pattern, unspecified headache type              Health Maintenance   Topic Date Due    TETANUS VACCINE  Never done    Mammogram  2021    DEXA SCAN  2023    Lipid Panel  2026    Hepatitis C Screening  Completed       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)        Past Surgical History:   Procedure Laterality Date    ABDOMINAL SURGERY      CATARACT EXTRACTION Left 10/2017     SECTION       CHOLECYSTECTOMY      COLON SURGERY      cystoscope      CYSTOSCOPY W/ RETROGRADES Right 10/10/2019    Procedure: CYSTOSCOPY, WITH RETROGRADE PYELOGRAM;  Surgeon: Gen Isbell MD;  Location: [facility] OR;  Service: Urology;  Laterality: Right;    ERCP N/A 11/24/2021    Procedure: ERCP (ENDOSCOPIC RETROGRADE CHOLANGIOPANCREATOGRAPHY);  Surgeon: Jayjay Rivera MD;  Location: [facility] ENDO ([facility] FLR);  Service: Endoscopy;  Laterality: N/A;    EYE SURGERY      HYSTERECTOMY  5/1996    LITHOTRIPSY      LIVER BIOPSY  9/14    carcinoid    URETEROSCOPY Right 10/10/2019    Procedure: URETEROSCOPY;  Surgeon: Gen Isbell MD;  Location: [facility] OR;  Service: Urology;  Laterality: Right;    UTERINE FIBROID SURGERY         family history includes Alzheimer's disease in her father; Cancer in her mother; No Known Problems in her son, son, son, and son; Stroke in her sister.    Social History     Tobacco Use    Smoking status: Never Smoker    Smokeless tobacco: Never Used   Substance Use Topics    Alcohol use: No     Alcohol/week: 0.0 standard drinks    Drug use: No       Review of Systems   Constitutional: Negative for chills, fever, malaise/fatigue and weight loss.   HENT: Negative for congestion, ear discharge and ear pain.    Eyes: Positive for blurred vision and pain. Negative for double vision, photophobia, discharge and redness.   Respiratory: Negative for cough and shortness of breath.    Cardiovascular: Negative for chest pain and leg swelling.   Gastrointestinal: Negative for abdominal pain, constipation, diarrhea and vomiting.   Genitourinary: Negative for dysuria.   Musculoskeletal: Negative for myalgias.   Skin: Negative for rash.   Neurological: Positive for headaches. Negative for dizziness, tingling, focal weakness and weakness.   Endo/Heme/Allergies: Does not bruise/bleed easily.   Psychiatric/Behavioral: Negative for depression and suicidal ideas.        Outpatient Encounter Medications as of 1/11/2022    Medication Sig Note Dispense Refill    alcohol swabs (BD ALCOHOL SWABS) PadM Apply 1 each topically as needed.  90 each 0    atorvastatin (LIPITOR) 40 MG tablet Take 1 tablet (40 mg total) by mouth every evening. 10/5/2021: Last filled in March 90 tablet 3    cyanocobalamin (VITAMIN B-12) 1000 MCG tablet Take 1 tablet (1,000 mcg total) by mouth once daily.  30 tablet 5    iron-vit c-b12-folic acid (IRON 100 PLUS) Tab Take 1 tablet by mouth once daily.  30 tablet 0    losartan (COZAAR) 50 MG tablet Take 1 tablet (50 mg total) by mouth once daily.  90 tablet 3    magnesium oxide (MAG-OX) 400 mg (241.3 mg magnesium) tablet Take 1 tablet (400 mg total) by mouth 2 (two) times daily. 10/5/2021: Not listed 60 tablet 1    meclizine (ANTIVERT) 25 mg tablet TAKE 1 TABLET(25 MG) BY MOUTH THREE TIMES DAILY AS NEEDED FOR DIZZINESS 11/4/2021: Requests refill 30 tablet 0    melatonin (MELATIN) 3 mg tablet Take 2 tablets (6 mg total) by mouth nightly as needed for Insomnia.   0    metoprolol tartrate (LOPRESSOR) 25 MG tablet Take 1 tablet (25 mg total) by mouth 2 (two) times daily. 11/26/2021: Still taking 180 tablet 0    nystatin (MYCOSTATIN) cream Apply topically 2 (two) times daily. (Patient taking differently: Apply topically 2 (two) times daily. Intertriginous areas)  30 g 0    oxyCODONE (ROXICODONE) 15 MG Tab Take 15 mg by mouth every 6 (six) hours as needed (chronic abdominal pain, malignancy related).       polyethylene glycol (GLYCOLAX) 17 gram PwPk Take 17 g by mouth 2 (two) times daily as needed.   0    trolamine salicylate (ASPERCREME) 10 % cream Apply 1 application topically as needed (extremity/joint pain).        No facility-administered encounter medications on file as of 1/11/2022.        Review of patient's allergies indicates:   Allergen Reactions    Contrast media Hives, Itching and Swelling    Epinephrine Anaphylaxis     Can cause  a Carcinoid Crisis    Ibuprofen Hives, Itching and Swelling  "   Iodinated contrast media     Sulfa (sulfonamide antibiotics) Hives, Itching and Swelling       Physical Exam      Vital Signs  BP: (!) 144/82  Height and Weight  Height: 5' 6" (167.6 cm)  Weight: 76.8 kg (169 lb 6.4 oz)  BSA (Calculated - sq m): 1.89 sq meters  BMI (Calculated): 27.4  Weight in (lb) to have BMI = 25: 154.6]    Physical Exam  Vitals reviewed.   Constitutional:       General: She is not in acute distress.     Appearance: Normal appearance. She is normal weight. She is not ill-appearing.   HENT:      Head: Normocephalic.      Right Ear: Tympanic membrane normal.      Left Ear: Tympanic membrane normal.      Nose: Nose normal.      Mouth/Throat:      Mouth: Mucous membranes are moist.      Pharynx: Oropharynx is clear.   Eyes:      Extraocular Movements: Extraocular movements intact.      Conjunctiva/sclera: Conjunctivae normal.      Pupils: Pupils are equal, round, and reactive to light.   Cardiovascular:      Rate and Rhythm: Normal rate and regular rhythm.      Pulses: Normal pulses.      Heart sounds: Normal heart sounds.   Pulmonary:      Effort: Pulmonary effort is normal.      Breath sounds: Normal breath sounds.   Abdominal:      General: Abdomen is flat. Bowel sounds are normal. There is no distension.      Palpations: Abdomen is soft.      Tenderness: There is no abdominal tenderness.   Musculoskeletal:         General: Normal range of motion.      Cervical back: Normal range of motion and neck supple.   Skin:     General: Skin is warm and dry.      Capillary Refill: Capillary refill takes less than 2 seconds.      Findings: No rash.   Neurological:      General: No focal deficit present.      Mental Status: She is alert and oriented to person, place, and time.      Motor: No weakness.      Gait: Gait normal.   Psychiatric:         Mood and Affect: Mood normal.         Behavior: Behavior normal.         Thought Content: Thought content normal.         Judgment: Judgment normal.      "     Laboratory:  CBC:  Recent Labs   Lab Result Units 11/20/21  1131 12/06/21  0519 12/06/21  0519 12/29/21  0809 12/29/21  0809 01/08/22  2124 01/08/22  2130   WBC K/uL  --  5.31   < > 7.07   < > 4.81  --    RBC M/uL  --  3.22*   < > 3.19*   < > 3.82*  --    Hemoglobin g/dL  --  8.7*   < > 8.6*   < > 10.5*  --    POC Hematocrit %PCV   < >  --   --   --   --   --  33*   Hematocrit %  --  28.8*   < > 28.2*   < > 34.1*  --    Platelets K/uL  --  332   < > 195   < > 240  --    MCV fL  --  89   < > 88   < > 89  --    MCH pg  --  27.0   < > 27.0   < > 27.5  --    MCHC g/dL  --  30.2*  --  30.5*  --  30.8*  --     < > = values in this interval not displayed.     CMP:  Recent Labs   Lab Result Units 12/06/21  0519 12/06/21  0519 12/10/21  0646 12/10/21  0646 12/29/21  0809   Glucose mg/dL 86   < > 96   < > 106   Calcium mg/dL 9.7   < > 9.4   < > 8.7   Albumin g/dL 2.8*   < > 2.7*   < > 3.2*   Total Protein g/dL 6.9   < > 6.9   < > 7.1   Sodium mmol/L 138   < > 138   < > 139   Potassium mmol/L 4.8   < > 4.8   < > 4.9   CO2 mmol/L 23   < > 17*   < > 19*   Chloride mmol/L 107   < > 111*   < > 108   BUN mg/dL 22   < > 27*   < > 18   Alkaline Phosphatase U/L 178*   < > 146*   < > 126   ALT U/L 11   < > 8*   < > 8*   AST U/L 15   < > 14   < > 17   Total Bilirubin mg/dL 0.4  --  0.4  --  0.3    < > = values in this interval not displayed.     URINALYSIS:  Recent Labs   Lab Result Units 11/01/21  0903 11/01/21  0903 11/04/21  1917 11/19/21  1527 11/19/21  1527 11/20/21  1318   Color, UA  Yellow   < > Yellow Yellow   < > Yellow   Specific [place], UA  1.015   < > 1.015 1.015   < > 1.010   pH, UA  6.0   < > 6.0 5.0   < > 5.0   Protein, UA  Negative   < > Negative Negative   < > Negative   Bacteria /hpf Many*   < >  --  Moderate*   < > Many*   Nitrite, UA  Positive*   < > Negative Negative   < > Positive*   Leukocytes, UA  3+*   < > Trace* Trace*   < > Trace*   Urobilinogen, UA EU/dL Negative   < > Negative  --   --   --    Hyaline  Casts, UA /lpf 1  --   --  5*  --   --     < > = values in this interval not displayed.      LIPIDS:  Recent Labs   Lab Result Units 11/05/21  1017   TSH uIU/mL 0.309*     TSH:  Recent Labs   Lab Result Units 11/05/21  1017   TSH uIU/mL 0.309*     A1C:  No results for input(s): HGBA1C in the last 2160 hours.    Radiology:      Assessment/Plan     Patito Domingo is a 71 y.o.female with:    1. Blurry vision  - Ambulatory referral/consult to Ophthalmology; Future    2. Essential hypertension    3. Nonintractable headache, unspecified chronicity pattern, unspecified headache type    I want patient to be seen by Optho ASAP.  She cant get around 2ndary to travel issues.      Of course with the patient's headache and blurry vision temporal arteritis is in consideration with the elevated ESR.  If anything gets worse patient is to go to emergency room ASAP.  I have tried to urgently refer patient to the ophthalmologist but we have issues getting patient to the doctor because of transportation problems.  A put in an urgent referral for patient to be seen.  If anything gets worse go straight to the ER.    -Continue current medications and maintain follow up with specialists.      Yasir Myers Jr, MD  [facility] [place]

## 2022-01-11 NOTE — TELEPHONE ENCOUNTER
----- Message from Sarah Lynn sent at 1/11/2022  4:33 PM CST -----  Regarding: FW: referral    ----- Message -----  From: Suzanne Hernandez  Sent: 1/11/2022  10:47 AM CST  To: [facility]hthal[facility] Clinical Support Staff  Subject: referral                                         Patient has a referral for Blurry vision and will need a soon appt. Please call patient to schedule. She does relay on public transportation. Patient referred by Dr. Myers and would like [place] office. Thanks

## 2022-01-11 NOTE — TELEPHONE ENCOUNTER
Looks like she was told to go to ER.  I agree.  Paramedian Forehead Flap Text: A decision was made to reconstruct the defect utilizing an interpolation axial flap and a staged reconstruction.  A telfa template was made of the defect.  This telfa template was then used to outline the paramedian forehead pedicle flap.  The donor area for the pedicle flap was then injected with anesthesia.  The flap was excised through the skin and subcutaneous tissue down to the layer of the underlying musculature.  The pedicle flap was carefully excised within this deep plane to maintain its blood supply.  The edges of the donor site were undermined.   The donor site was closed in a primary fashion.  The pedicle was then rotated into position and sutured.  Once the tube was sutured into place, adequate blood supply was confirmed with blanching and refill.  The pedicle was then wrapped with xeroform gauze and dressed appropriately with a telfa and gauze bandage to ensure continued blood supply and protect the attached pedicle.

## 2022-01-13 ENCOUNTER — OFFICE VISIT (OUTPATIENT)
Dept: OPTOMETRY | Facility: CLINIC | Age: 72
End: 2022-01-13
Payer: MEDICARE

## 2022-01-13 DIAGNOSIS — H52.4 MYOPIA WITH ASTIGMATISM AND PRESBYOPIA, BILATERAL: ICD-10-CM

## 2022-01-13 DIAGNOSIS — Z96.1 PSEUDOPHAKIA OF BOTH EYES: ICD-10-CM

## 2022-01-13 DIAGNOSIS — H43.813 PVD (POSTERIOR VITREOUS DETACHMENT), BILATERAL: ICD-10-CM

## 2022-01-13 DIAGNOSIS — H04.123 DRY EYE SYNDROME OF BOTH EYES: ICD-10-CM

## 2022-01-13 DIAGNOSIS — H52.13 MYOPIA WITH ASTIGMATISM AND PRESBYOPIA, BILATERAL: ICD-10-CM

## 2022-01-13 DIAGNOSIS — H52.203 MYOPIA WITH ASTIGMATISM AND PRESBYOPIA, BILATERAL: ICD-10-CM

## 2022-01-13 DIAGNOSIS — H35.033 HYPERTENSIVE RETINOPATHY OF BOTH EYES: ICD-10-CM

## 2022-01-13 DIAGNOSIS — H53.8 BLURRY VISION: ICD-10-CM

## 2022-01-13 DIAGNOSIS — G43.109 OCULAR MIGRAINE: Primary | ICD-10-CM

## 2022-01-13 PROCEDURE — 1160F PR REVIEW ALL MEDS BY PRESCRIBER/CLIN PHARMACIST DOCUMENTED: ICD-10-PCS | Mod: HCNC,CPTII,S$GLB, | Performed by: OPTOMETRIST

## 2022-01-13 PROCEDURE — 1125F AMNT PAIN NOTED PAIN PRSNT: CPT | Mod: HCNC,CPTII,S$GLB, | Performed by: OPTOMETRIST

## 2022-01-13 PROCEDURE — 1160F RVW MEDS BY RX/DR IN RCRD: CPT | Mod: HCNC,CPTII,S$GLB, | Performed by: OPTOMETRIST

## 2022-01-13 PROCEDURE — 92015 PR REFRACTION: ICD-10-PCS | Mod: HCNC,S$GLB,, | Performed by: OPTOMETRIST

## 2022-01-13 PROCEDURE — 99999 PR PBB SHADOW E&M-EST. PATIENT-LVL III: ICD-10-PCS | Mod: PBBFAC,HCNC,, | Performed by: OPTOMETRIST

## 2022-01-13 PROCEDURE — 3288F PR FALLS RISK ASSESSMENT DOCUMENTED: ICD-10-PCS | Mod: HCNC,CPTII,S$GLB, | Performed by: OPTOMETRIST

## 2022-01-13 PROCEDURE — 1101F PT FALLS ASSESS-DOCD LE1/YR: CPT | Mod: HCNC,CPTII,S$GLB, | Performed by: OPTOMETRIST

## 2022-01-13 PROCEDURE — 1159F PR MEDICATION LIST DOCUMENTED IN MEDICAL RECORD: ICD-10-PCS | Mod: HCNC,CPTII,S$GLB, | Performed by: OPTOMETRIST

## 2022-01-13 PROCEDURE — 3288F FALL RISK ASSESSMENT DOCD: CPT | Mod: HCNC,CPTII,S$GLB, | Performed by: OPTOMETRIST

## 2022-01-13 PROCEDURE — 1101F PR PT FALLS ASSESS DOC 0-1 FALLS W/OUT INJ PAST YR: ICD-10-PCS | Mod: HCNC,CPTII,S$GLB, | Performed by: OPTOMETRIST

## 2022-01-13 PROCEDURE — 92004 COMPRE OPH EXAM NEW PT 1/>: CPT | Mod: HCNC,S$GLB,, | Performed by: OPTOMETRIST

## 2022-01-13 PROCEDURE — 92004 PR EYE EXAM, NEW PATIENT,COMPREHESV: ICD-10-PCS | Mod: HCNC,S$GLB,, | Performed by: OPTOMETRIST

## 2022-01-13 PROCEDURE — 1125F PR PAIN SEVERITY QUANTIFIED, PAIN PRESENT: ICD-10-PCS | Mod: HCNC,CPTII,S$GLB, | Performed by: OPTOMETRIST

## 2022-01-13 PROCEDURE — 1159F MED LIST DOCD IN RCRD: CPT | Mod: HCNC,CPTII,S$GLB, | Performed by: OPTOMETRIST

## 2022-01-13 PROCEDURE — 92015 DETERMINE REFRACTIVE STATE: CPT | Mod: HCNC,S$GLB,, | Performed by: OPTOMETRIST

## 2022-01-13 PROCEDURE — 99999 PR PBB SHADOW E&M-EST. PATIENT-LVL III: CPT | Mod: PBBFAC,HCNC,, | Performed by: OPTOMETRIST

## 2022-01-13 NOTE — PROGRESS NOTES
"HPI     CC: Pt is here today for ocular concerns due to blurry vision. Patient   states that when she was watching TV she noticed that all of a sudden a   corner of the TV was blurred out. She notes that the vision in that area   was "foggy." She denies blacked-out vision or sudden vision loss of entire   field. Symptoms lasted for about 30 min. She also had a headache   afterwards. This has not happened again since initial episode. Patient   reports overall that her vision has seemed to worsen. Her eyes are also   sore. Patient reports recent h/o high BP but it is becoming more   controlled now. Patient also reports floaters in her vision OU that have   been there for a while.     TARSHA: 2 years    (+) Changes in vision   (+) Pain  (+) Irritation   (-) Itching   (-) Flashes  (+) Floaters  (-) Glasses wearer  (-) CL wearer  (+) Uses eye gtts, OTC Tears prn OU    Does patient want a refraction today? Yes    (-) Eye injury  (+) Eye surgery, Cataract OU  (-)POHx  (-)FOHx    (-)DM  Hemoglobin A1C       Date                     Value               Ref Range             Status                03/08/2021               6.2 (H)             4.0 - 5.6 %           Final                  02/14/2020               6.6 (H)             4.0 - 5.6 %           Final                 06/26/2019               6.1 (H)             4.0 - 5.6 %           Final                   12/30/2017               5.6                 4.0 - 5.6 %           Final                     Last edited by Margi Zhong, OD on 1/13/2022  1:08 PM. (History)            Assessment /Plan     For exam results, see Encounter Report.    Ocular migraine    Blurry vision  -     Ambulatory referral/consult to Ophthalmology    Dry eye syndrome of both eyes    Hypertensive retinopathy of both eyes    PVD (posterior vitreous detachment), bilateral    Pseudophakia of both eyes    Myopia with astigmatism and presbyopia, bilateral      1-2. Educated pt on findings. Blurred vision " episode appears consistent with ocular migraine. Patient reports portion of her vision blurred/became foggy for ~30 min with headache occurring afterwards. No permanent vision changes noted. No amaurosis fugax noted. Optic nerve appearance WNL OU. Retina WNL OU. CVF WNL OU. Recommend for patient to keep a journal of possible triggers, if visual episode occurs again. Try to avoid triggers. If frequency increases, recommend to f/u with PCP. If permanent vision changes noted, RTC ASAP. Monitor yearly.     3. Educated pt on findings. Recommend ATs TID-QID for added lubrication and comfort. Discussed dry eyes are cause for intermittent blurred vision.   If symptoms worsen or dont improve, RTC. Monitor.     4. Vessel changes, OU. Patient reports h/o high BP, but is starting to have better control.   Discussed importance of BP control, taking medications as directed, and following-up with primary care. If changes in vision noted, RTC. Monitor yearly unless changes noted sooner.     5. Educated pt on findings. No holes, tears, detachments 360 OU. (-)nathan sign OU. Educated on s/s of RD and to RTC ASAP if occur. Monitor yearly unless changes noted sooner.     6. PCIOL clear and centered OU. Monitor yearly.     7. Updated SRx. Recommend FTW specs. BCVA 20/20-2 OD and 20/20-2 OS. Great vision with updated specs. Monitor yearly.       RTC in 1 year for annual eye exam or sooner if needed.

## 2022-01-14 DIAGNOSIS — I1A.0 RESISTANT HYPERTENSION: ICD-10-CM

## 2022-01-14 RX ORDER — METOPROLOL TARTRATE 25 MG/1
TABLET, FILM COATED ORAL
Qty: 90 TABLET | Refills: 0 | OUTPATIENT
Start: 2022-01-14

## 2022-01-14 RX ORDER — LOSARTAN POTASSIUM 100 MG/1
TABLET ORAL
Qty: 90 TABLET | Refills: 0 | OUTPATIENT
Start: 2022-01-14

## 2022-01-14 NOTE — TELEPHONE ENCOUNTER
[facility] Note  Quick DC. Inappropriate Request   Refill request requires further review by MD: NO   Medication Therapy Plan: Pharmacy is requesting new script(s) for the following medications without required information, (sig/ frequency/qty/etc)     [facility] action(s):  Quick Discontinue      Duplicate Pended Encounter(s)/ Last Prescribed Details:    Pharmacies have been requesting medications for patients without required information, (sig, frequency, qty, etc.). In addition, requests are sent for medication(s) pt. are currently not taking, and medications patients have never taken.    We have spoken to the pharmacies about these request types and advised their teams previously that we are unable to assess these New Script requests and require all details for these requests. This is a known issue and has been reported.        Medication related problems are not assessed for QDC.   Medication Reconciliation Completed? NO Were there pending details that required adjustment? NO     Automatic Epic Generated Protocol Data Below:   Requested Prescriptions   Pending Prescriptions Disp Refills    losartan (COZAAR) 100 MG tablet [Pharmacy Med Name: LOSARTAN 100MG TAB] 90 tablet 0              Appointments      Date Provider   Last Visit   1/11/2022 Yasir Myers Jr., MD   Next Visit   Visit date not found Yasir Myers Jr., MD        Note composed:1:51 PM 01/14/2022

## 2022-01-14 NOTE — TELEPHONE ENCOUNTER
No new care gaps identified.  Powered by [employer]. Reference number: [id number].   1/14/2022 10:39:19 AM CST

## 2022-01-14 NOTE — TELEPHONE ENCOUNTER
[facility] Note  Quick DC. Inappropriate Request   Refill request requires further review by MD: NO   Medication Therapy Plan: Pharmacy is requesting new script(s) for the following medications without required information, (sig/ frequency/qty/etc)     [facility] action(s):  Quick Discontinue      Duplicate Pended Encounter(s)/ Last Prescribed Details:    Pharmacies have been requesting medications for patients without required information, (sig, frequency, qty, etc.). In addition, requests are sent for medication(s) pt. are currently not taking, and medications patients have never taken.    We have spoken to the pharmacies about these request types and advised their teams previously that we are unable to assess these New Script requests and require all details for these requests. This is a known issue and has been reported.        Medication related problems are not assessed for QDC.   Medication Reconciliation Completed? NO Were there pending details that required adjustment? NO     Automatic Epic Generated Protocol Data Below:   Requested Prescriptions   Pending Prescriptions Disp Refills    metoprolol tartrate (LOPRESSOR) 25 MG tablet [Pharmacy Med Name: METOPROLOL TART 25MG TAB] 90 tablet 0              Appointments      Date Provider   Last Visit   1/11/2022 Yasir Myers Jr., MD   Next Visit   1/14/2022 Yasir Myers Jr., MD        Note composed:1:50 PM 01/14/2022

## 2022-01-20 ENCOUNTER — PES CALL (OUTPATIENT)
Dept: ADMINISTRATIVE | Facility: CLINIC | Age: 72
End: 2022-01-20
Payer: MEDICARE

## 2022-01-30 NOTE — ADDENDUM NOTE
Addended by: AHMET AMARO on: 9/17/2019 03:44 PM     Modules accepted: Orders    
Vaccine status unknown

## 2022-01-31 ENCOUNTER — EXTERNAL CHRONIC CARE MANAGEMENT (OUTPATIENT)
Dept: PRIMARY CARE CLINIC | Facility: CLINIC | Age: 72
End: 2022-01-31
Payer: MEDICARE

## 2022-01-31 PROCEDURE — 99490 PR CHRONIC CARE MGMT, 1ST 20 MIN: ICD-10-PCS | Mod: S$GLB,,, | Performed by: INTERNAL MEDICINE

## 2022-01-31 PROCEDURE — 99490 CHRNC CARE MGMT STAFF 1ST 20: CPT | Mod: S$GLB,,, | Performed by: INTERNAL MEDICINE

## 2022-02-02 ENCOUNTER — HOSPITAL ENCOUNTER (INPATIENT)
Facility: HOSPITAL | Age: 72
LOS: 5 days | Discharge: HOME HEALTH | DRG: 690 | End: 2022-02-09
Attending: EMERGENCY MEDICINE | Admitting: HOSPITALIST
Payer: MEDICARE

## 2022-02-02 DIAGNOSIS — Z16.12 ESBL (EXTENDED SPECTRUM BETA-LACTAMASE) PRODUCING BACTERIA INFECTION: ICD-10-CM

## 2022-02-02 DIAGNOSIS — R53.1 WEAKNESS: ICD-10-CM

## 2022-02-02 DIAGNOSIS — C7B.00 METASTATIC CARCINOID TUMOR: Chronic | ICD-10-CM

## 2022-02-02 DIAGNOSIS — N39.0 URINARY TRACT INFECTION WITH HEMATURIA, SITE UNSPECIFIED: Primary | ICD-10-CM

## 2022-02-02 DIAGNOSIS — R31.9 URINARY TRACT INFECTION WITH HEMATURIA, SITE UNSPECIFIED: Primary | ICD-10-CM

## 2022-02-02 DIAGNOSIS — R07.9 CHEST PAIN: ICD-10-CM

## 2022-02-02 DIAGNOSIS — A49.9 ESBL (EXTENDED SPECTRUM BETA-LACTAMASE) PRODUCING BACTERIA INFECTION: ICD-10-CM

## 2022-02-02 DIAGNOSIS — N13.30 HYDRONEPHROSIS, UNSPECIFIED HYDRONEPHROSIS TYPE: ICD-10-CM

## 2022-02-02 PROBLEM — D50.8 IRON DEFICIENCY ANEMIA SECONDARY TO INADEQUATE DIETARY IRON INTAKE: Status: ACTIVE | Noted: 2018-08-14

## 2022-02-02 PROBLEM — N13.39 OTHER HYDRONEPHROSIS: Status: ACTIVE | Noted: 2022-02-02

## 2022-02-02 LAB
ALBUMIN SERPL BCP-MCNC: 3.8 G/DL (ref 3.5–5.2)
ALP SERPL-CCNC: 133 U/L (ref 55–135)
ALT SERPL W/O P-5'-P-CCNC: <5 U/L (ref 10–44)
ANION GAP SERPL CALC-SCNC: 10 MMOL/L (ref 8–16)
AST SERPL-CCNC: 12 U/L (ref 10–40)
BACTERIA #/AREA URNS HPF: ABNORMAL /HPF
BASOPHILS # BLD AUTO: 0.02 K/UL (ref 0–0.2)
BASOPHILS NFR BLD: 0.3 % (ref 0–1.9)
BILIRUB SERPL-MCNC: 0.4 MG/DL (ref 0.1–1)
BILIRUB UR QL STRIP: NEGATIVE
BNP SERPL-MCNC: 15 PG/ML (ref 0–99)
BUN SERPL-MCNC: 15 MG/DL (ref 8–23)
CALCIUM SERPL-MCNC: 10 MG/DL (ref 8.7–10.5)
CHLORIDE SERPL-SCNC: 107 MMOL/L (ref 95–110)
CLARITY UR: CLEAR
CO2 SERPL-SCNC: 21 MMOL/L (ref 23–29)
COLOR UR: COLORLESS
CREAT SERPL-MCNC: 0.9 MG/DL (ref 0.5–1.4)
CRP SERPL-MCNC: 2.6 MG/L (ref 0–8.2)
CTP QC/QA: YES
DIFFERENTIAL METHOD: ABNORMAL
EOSINOPHIL # BLD AUTO: 0.1 K/UL (ref 0–0.5)
EOSINOPHIL NFR BLD: 1 % (ref 0–8)
ERYTHROCYTE [DISTWIDTH] IN BLOOD BY AUTOMATED COUNT: 14.2 % (ref 11.5–14.5)
EST. GFR  (AFRICAN AMERICAN): >60 ML/MIN/1.73 M^2
EST. GFR  (NON AFRICAN AMERICAN): >60 ML/MIN/1.73 M^2
GLUCOSE SERPL-MCNC: 107 MG/DL (ref 70–110)
GLUCOSE UR QL STRIP: NEGATIVE
HCT VFR BLD AUTO: 31.2 % (ref 37–48.5)
HGB BLD-MCNC: 9.9 G/DL (ref 12–16)
HGB UR QL STRIP: NEGATIVE
IMM GRANULOCYTES # BLD AUTO: 0.03 K/UL (ref 0–0.04)
IMM GRANULOCYTES NFR BLD AUTO: 0.5 % (ref 0–0.5)
KETONES UR QL STRIP: NEGATIVE
LACTATE SERPL-SCNC: 1.3 MMOL/L (ref 0.5–2.2)
LEUKOCYTE ESTERASE UR QL STRIP: ABNORMAL
LIPASE SERPL-CCNC: 20 U/L (ref 4–60)
LYMPHOCYTES # BLD AUTO: 1.1 K/UL (ref 1–4.8)
LYMPHOCYTES NFR BLD: 19.8 % (ref 18–48)
MAGNESIUM SERPL-MCNC: 1.8 MG/DL (ref 1.6–2.6)
MCH RBC QN AUTO: 27.8 PG (ref 27–31)
MCHC RBC AUTO-ENTMCNC: 31.7 G/DL (ref 32–36)
MCV RBC AUTO: 88 FL (ref 82–98)
MICROSCOPIC COMMENT: ABNORMAL
MONOCYTES # BLD AUTO: 0.4 K/UL (ref 0.3–1)
MONOCYTES NFR BLD: 7.1 % (ref 4–15)
NEUTROPHILS # BLD AUTO: 4.1 K/UL (ref 1.8–7.7)
NEUTROPHILS NFR BLD: 71.3 % (ref 38–73)
NITRITE UR QL STRIP: POSITIVE
NRBC BLD-RTO: 0 /100 WBC
PH UR STRIP: 6 [PH] (ref 5–8)
PLATELET # BLD AUTO: 248 K/UL (ref 150–450)
PMV BLD AUTO: 10.5 FL (ref 9.2–12.9)
POTASSIUM SERPL-SCNC: 3.8 MMOL/L (ref 3.5–5.1)
PROCALCITONIN SERPL IA-MCNC: 0.03 NG/ML
PROT SERPL-MCNC: 8.1 G/DL (ref 6–8.4)
PROT UR QL STRIP: NEGATIVE
RBC # BLD AUTO: 3.56 M/UL (ref 4–5.4)
RBC #/AREA URNS HPF: 2 /HPF (ref 0–4)
SARS-COV-2 RDRP RESP QL NAA+PROBE: NEGATIVE
SODIUM SERPL-SCNC: 138 MMOL/L (ref 136–145)
SP GR UR STRIP: 1.02 (ref 1–1.03)
SQUAMOUS #/AREA URNS HPF: 2 /HPF
TROPONIN I SERPL DL<=0.01 NG/ML-MCNC: <0.006 NG/ML (ref 0–0.03)
TSH SERPL DL<=0.005 MIU/L-ACNC: 1.46 UIU/ML (ref 0.4–4)
URATE SERPL-MCNC: 4 MG/DL (ref 2.4–5.7)
URN SPEC COLLECT METH UR: ABNORMAL
UROBILINOGEN UR STRIP-ACNC: NEGATIVE EU/DL
WBC # BLD AUTO: 5.75 K/UL (ref 3.9–12.7)
WBC #/AREA URNS HPF: 13 /HPF (ref 0–5)

## 2022-02-02 PROCEDURE — 80053 COMPREHEN METABOLIC PANEL: CPT | Mod: HCNC | Performed by: EMERGENCY MEDICINE

## 2022-02-02 PROCEDURE — 87086 URINE CULTURE/COLONY COUNT: CPT | Mod: HCNC | Performed by: HOSPITALIST

## 2022-02-02 PROCEDURE — 63600175 PHARM REV CODE 636 W HCPCS: Mod: HCNC | Performed by: NURSE PRACTITIONER

## 2022-02-02 PROCEDURE — 84484 ASSAY OF TROPONIN QUANT: CPT | Mod: HCNC | Performed by: EMERGENCY MEDICINE

## 2022-02-02 PROCEDURE — 96361 HYDRATE IV INFUSION ADD-ON: CPT | Mod: HCNC

## 2022-02-02 PROCEDURE — 87186 SC STD MICRODIL/AGAR DIL: CPT | Mod: HCNC | Performed by: HOSPITALIST

## 2022-02-02 PROCEDURE — 83690 ASSAY OF LIPASE: CPT | Mod: HCNC | Performed by: EMERGENCY MEDICINE

## 2022-02-02 PROCEDURE — 83605 ASSAY OF LACTIC ACID: CPT | Mod: HCNC | Performed by: EMERGENCY MEDICINE

## 2022-02-02 PROCEDURE — G0378 HOSPITAL OBSERVATION PER HR: HCPCS | Mod: HCNC

## 2022-02-02 PROCEDURE — 85025 COMPLETE CBC W/AUTO DIFF WBC: CPT | Mod: HCNC | Performed by: EMERGENCY MEDICINE

## 2022-02-02 PROCEDURE — 94760 N-INVAS EAR/PLS OXIMETRY 1: CPT | Mod: HCNC

## 2022-02-02 PROCEDURE — 99284 EMERGENCY DEPT VISIT MOD MDM: CPT | Mod: ,,, | Performed by: UROLOGY

## 2022-02-02 PROCEDURE — 86140 C-REACTIVE PROTEIN: CPT | Mod: HCNC | Performed by: EMERGENCY MEDICINE

## 2022-02-02 PROCEDURE — 83735 ASSAY OF MAGNESIUM: CPT | Mod: HCNC | Performed by: EMERGENCY MEDICINE

## 2022-02-02 PROCEDURE — 81000 URINALYSIS NONAUTO W/SCOPE: CPT | Mod: HCNC | Performed by: EMERGENCY MEDICINE

## 2022-02-02 PROCEDURE — 84145 PROCALCITONIN (PCT): CPT | Mod: HCNC | Performed by: EMERGENCY MEDICINE

## 2022-02-02 PROCEDURE — 25000003 PHARM REV CODE 250: Mod: HCNC | Performed by: NURSE PRACTITIONER

## 2022-02-02 PROCEDURE — 96372 THER/PROPH/DIAG INJ SC/IM: CPT | Mod: HCNC,59 | Performed by: NURSE PRACTITIONER

## 2022-02-02 PROCEDURE — 99284 PR EMERGENCY DEPT VISIT,LEVEL IV: ICD-10-PCS | Mod: ,,, | Performed by: UROLOGY

## 2022-02-02 PROCEDURE — 63600175 PHARM REV CODE 636 W HCPCS: Mod: HCNC | Performed by: EMERGENCY MEDICINE

## 2022-02-02 PROCEDURE — 84443 ASSAY THYROID STIM HORMONE: CPT | Mod: HCNC | Performed by: EMERGENCY MEDICINE

## 2022-02-02 PROCEDURE — 96375 TX/PRO/DX INJ NEW DRUG ADDON: CPT | Mod: HCNC

## 2022-02-02 PROCEDURE — 87077 CULTURE AEROBIC IDENTIFY: CPT | Mod: HCNC | Performed by: HOSPITALIST

## 2022-02-02 PROCEDURE — 99900035 HC TECH TIME PER 15 MIN (STAT): Mod: HCNC

## 2022-02-02 PROCEDURE — 25000003 PHARM REV CODE 250: Mod: HCNC | Performed by: EMERGENCY MEDICINE

## 2022-02-02 PROCEDURE — 99285 EMERGENCY DEPT VISIT HI MDM: CPT | Mod: 25,HCNC

## 2022-02-02 PROCEDURE — 87088 URINE BACTERIA CULTURE: CPT | Mod: HCNC | Performed by: HOSPITALIST

## 2022-02-02 PROCEDURE — 84550 ASSAY OF BLOOD/URIC ACID: CPT | Mod: HCNC | Performed by: EMERGENCY MEDICINE

## 2022-02-02 PROCEDURE — 83880 ASSAY OF NATRIURETIC PEPTIDE: CPT | Mod: HCNC | Performed by: EMERGENCY MEDICINE

## 2022-02-02 PROCEDURE — 96374 THER/PROPH/DIAG INJ IV PUSH: CPT | Mod: HCNC

## 2022-02-02 PROCEDURE — 25500020 PHARM REV CODE 255: Mod: HCNC | Performed by: EMERGENCY MEDICINE

## 2022-02-02 PROCEDURE — U0002 COVID-19 LAB TEST NON-CDC: HCPCS | Mod: HCNC | Performed by: HOSPITALIST

## 2022-02-02 RX ORDER — METHYLPREDNISOLONE SOD SUCC 125 MG
80 VIAL (EA) INJECTION
Status: COMPLETED | OUTPATIENT
Start: 2022-02-02 | End: 2022-02-02

## 2022-02-02 RX ORDER — NALOXONE HCL 0.4 MG/ML
0.02 VIAL (ML) INJECTION
Status: DISCONTINUED | OUTPATIENT
Start: 2022-02-02 | End: 2022-02-09 | Stop reason: HOSPADM

## 2022-02-02 RX ORDER — AMOXICILLIN 250 MG
1 CAPSULE ORAL DAILY PRN
Status: DISCONTINUED | OUTPATIENT
Start: 2022-02-02 | End: 2022-02-09 | Stop reason: HOSPADM

## 2022-02-02 RX ORDER — ONDANSETRON 2 MG/ML
4 INJECTION INTRAMUSCULAR; INTRAVENOUS
Status: COMPLETED | OUTPATIENT
Start: 2022-02-02 | End: 2022-02-02

## 2022-02-02 RX ORDER — ENOXAPARIN SODIUM 100 MG/ML
40 INJECTION SUBCUTANEOUS EVERY 24 HOURS
Status: DISCONTINUED | OUTPATIENT
Start: 2022-02-02 | End: 2022-02-09 | Stop reason: HOSPADM

## 2022-02-02 RX ORDER — SIMETHICONE 80 MG
1 TABLET,CHEWABLE ORAL 4 TIMES DAILY PRN
Status: DISCONTINUED | OUTPATIENT
Start: 2022-02-02 | End: 2022-02-09 | Stop reason: HOSPADM

## 2022-02-02 RX ORDER — OXYCODONE HYDROCHLORIDE 5 MG/1
15 TABLET ORAL EVERY 4 HOURS PRN
Status: DISCONTINUED | OUTPATIENT
Start: 2022-02-02 | End: 2022-02-09 | Stop reason: HOSPADM

## 2022-02-02 RX ORDER — SODIUM,POTASSIUM PHOSPHATES 280-250MG
2 POWDER IN PACKET (EA) ORAL
Status: DISCONTINUED | OUTPATIENT
Start: 2022-02-02 | End: 2022-02-09 | Stop reason: HOSPADM

## 2022-02-02 RX ORDER — IBUPROFEN 200 MG
24 TABLET ORAL
Status: DISCONTINUED | OUTPATIENT
Start: 2022-02-02 | End: 2022-02-09 | Stop reason: HOSPADM

## 2022-02-02 RX ORDER — DIPHENHYDRAMINE HYDROCHLORIDE 50 MG/ML
50 INJECTION INTRAMUSCULAR; INTRAVENOUS
Status: COMPLETED | OUTPATIENT
Start: 2022-02-02 | End: 2022-02-02

## 2022-02-02 RX ORDER — METOPROLOL TARTRATE 25 MG/1
25 TABLET, FILM COATED ORAL 2 TIMES DAILY
Status: DISCONTINUED | OUTPATIENT
Start: 2022-02-02 | End: 2022-02-09 | Stop reason: HOSPADM

## 2022-02-02 RX ORDER — SODIUM CHLORIDE 0.9 % (FLUSH) 0.9 %
10 SYRINGE (ML) INJECTION EVERY 8 HOURS PRN
Status: DISCONTINUED | OUTPATIENT
Start: 2022-02-02 | End: 2022-02-09 | Stop reason: HOSPADM

## 2022-02-02 RX ORDER — ACETAMINOPHEN 325 MG/1
650 TABLET ORAL EVERY 8 HOURS PRN
Status: DISCONTINUED | OUTPATIENT
Start: 2022-02-02 | End: 2022-02-09 | Stop reason: HOSPADM

## 2022-02-02 RX ORDER — LANOLIN ALCOHOL/MO/W.PET/CERES
800 CREAM (GRAM) TOPICAL
Status: DISCONTINUED | OUTPATIENT
Start: 2022-02-02 | End: 2022-02-09 | Stop reason: HOSPADM

## 2022-02-02 RX ORDER — IPRATROPIUM BROMIDE AND ALBUTEROL SULFATE 2.5; .5 MG/3ML; MG/3ML
3 SOLUTION RESPIRATORY (INHALATION) EVERY 6 HOURS PRN
Status: DISCONTINUED | OUTPATIENT
Start: 2022-02-02 | End: 2022-02-09 | Stop reason: HOSPADM

## 2022-02-02 RX ORDER — IBUPROFEN 200 MG
16 TABLET ORAL
Status: DISCONTINUED | OUTPATIENT
Start: 2022-02-02 | End: 2022-02-09 | Stop reason: HOSPADM

## 2022-02-02 RX ORDER — FENTANYL CITRATE 50 UG/ML
100 INJECTION, SOLUTION INTRAMUSCULAR; INTRAVENOUS
Status: COMPLETED | OUTPATIENT
Start: 2022-02-02 | End: 2022-02-02

## 2022-02-02 RX ORDER — HYDROMORPHONE HYDROCHLORIDE 1 MG/ML
1 INJECTION, SOLUTION INTRAMUSCULAR; INTRAVENOUS; SUBCUTANEOUS
Status: COMPLETED | OUTPATIENT
Start: 2022-02-02 | End: 2022-02-02

## 2022-02-02 RX ORDER — GLUCAGON 1 MG
1 KIT INJECTION
Status: DISCONTINUED | OUTPATIENT
Start: 2022-02-02 | End: 2022-02-09 | Stop reason: HOSPADM

## 2022-02-02 RX ORDER — PROCHLORPERAZINE EDISYLATE 5 MG/ML
5 INJECTION INTRAMUSCULAR; INTRAVENOUS EVERY 6 HOURS PRN
Status: DISCONTINUED | OUTPATIENT
Start: 2022-02-02 | End: 2022-02-09 | Stop reason: HOSPADM

## 2022-02-02 RX ORDER — TALC
6 POWDER (GRAM) TOPICAL NIGHTLY PRN
Status: DISCONTINUED | OUTPATIENT
Start: 2022-02-02 | End: 2022-02-09 | Stop reason: HOSPADM

## 2022-02-02 RX ORDER — ATORVASTATIN CALCIUM 40 MG/1
40 TABLET, FILM COATED ORAL NIGHTLY
Status: DISCONTINUED | OUTPATIENT
Start: 2022-02-02 | End: 2022-02-09 | Stop reason: HOSPADM

## 2022-02-02 RX ORDER — LANOLIN ALCOHOL/MO/W.PET/CERES
400 CREAM (GRAM) TOPICAL 2 TIMES DAILY
Status: DISCONTINUED | OUTPATIENT
Start: 2022-02-02 | End: 2022-02-09 | Stop reason: HOSPADM

## 2022-02-02 RX ORDER — LOSARTAN POTASSIUM 50 MG/1
50 TABLET ORAL DAILY
Status: DISCONTINUED | OUTPATIENT
Start: 2022-02-02 | End: 2022-02-09 | Stop reason: HOSPADM

## 2022-02-02 RX ORDER — MAG HYDROX/ALUMINUM HYD/SIMETH 200-200-20
30 SUSPENSION, ORAL (FINAL DOSE FORM) ORAL 4 TIMES DAILY PRN
Status: DISCONTINUED | OUTPATIENT
Start: 2022-02-02 | End: 2022-02-09 | Stop reason: HOSPADM

## 2022-02-02 RX ADMIN — SODIUM CHLORIDE 1000 ML: 0.9 INJECTION, SOLUTION INTRAVENOUS at 06:02

## 2022-02-02 RX ADMIN — MEROPENEM 500 MG: 500 INJECTION, POWDER, FOR SOLUTION INTRAVENOUS at 06:02

## 2022-02-02 RX ADMIN — ENOXAPARIN SODIUM 40 MG: 100 INJECTION SUBCUTANEOUS at 06:02

## 2022-02-02 RX ADMIN — HYDROMORPHONE HYDROCHLORIDE 1 MG: 1 INJECTION, SOLUTION INTRAMUSCULAR; INTRAVENOUS; SUBCUTANEOUS at 06:02

## 2022-02-02 RX ADMIN — OXYCODONE 15 MG: 5 TABLET ORAL at 10:02

## 2022-02-02 RX ADMIN — Medication 400 MG: at 09:02

## 2022-02-02 RX ADMIN — ATORVASTATIN CALCIUM 40 MG: 40 TABLET, FILM COATED ORAL at 09:02

## 2022-02-02 RX ADMIN — OXYCODONE 15 MG: 5 TABLET ORAL at 02:02

## 2022-02-02 RX ADMIN — IOHEXOL 75 ML: 350 INJECTION, SOLUTION INTRAVENOUS at 08:02

## 2022-02-02 RX ADMIN — METHYLPREDNISOLONE SODIUM SUCCINATE 80 MG: 125 INJECTION, POWDER, LYOPHILIZED, FOR SOLUTION INTRAMUSCULAR; INTRAVENOUS at 07:02

## 2022-02-02 RX ADMIN — METOPROLOL TARTRATE 25 MG: 25 TABLET, FILM COATED ORAL at 09:02

## 2022-02-02 RX ADMIN — DIPHENHYDRAMINE HYDROCHLORIDE 50 MG: 50 INJECTION INTRAMUSCULAR; INTRAVENOUS at 07:02

## 2022-02-02 RX ADMIN — MEROPENEM 500 MG: 500 INJECTION, POWDER, FOR SOLUTION INTRAVENOUS at 10:02

## 2022-02-02 RX ADMIN — ONDANSETRON 4 MG: 2 INJECTION INTRAMUSCULAR; INTRAVENOUS at 06:02

## 2022-02-02 RX ADMIN — OXYCODONE 15 MG: 5 TABLET ORAL at 06:02

## 2022-02-02 RX ADMIN — FENTANYL CITRATE 100 MCG: 50 INJECTION INTRAMUSCULAR; INTRAVENOUS at 09:02

## 2022-02-02 RX ADMIN — LOSARTAN POTASSIUM 50 MG: 50 TABLET, FILM COATED ORAL at 02:02

## 2022-02-02 NOTE — HPI
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  States more pain than baseline, has chronic pain.  Fatigue worsening for days.  Denies hematuria, reports frequency and urgency of urination and what seems like higher urine volumes.  H/o stones in past and follows with OSH urologist with intervention in past, but states larger stones specifically on right are chronic.  She reports some flank and lower abd/epigastric pain.  CT showed bilateral hydronephrosis to distended bladder.  Pt reports voided after CT scan.  No fever or chills.  Urology consulted for bilateral hydro, abdominal pain.     She also states has had stents in past and does not tolerate.  Was at time of stone procedures in past.

## 2022-02-02 NOTE — ED NOTES
Pt lying in bed and requesting to use bathroom. Pt assisted out of bed and ambulated to bathroom with minimal assistance from RN. Pt voided and returned to bed. Pt continues with pain and VSS checked. Plan of care reviewed, call bell within reach.

## 2022-02-02 NOTE — PHARMACY MED REC
"Admission Medication History     The home medication history was taken by Simran Ren CPhT.    Medication history obtained from, Patient Verified    You may go to "Admission" then "Reconcile Home Medications" tabs to review and/or act upon these items.      The home medication list has been updated by the Pharmacy department.    Please read ALL comments highlighted in yellow.    Please address this information as you see fit.     Feel free to contact us if you have any questions or require assistance.      The medications listed below were removed from the home medication list.  Please reorder if appropriate:  Patient reports no longer taking the following medication(s):   Iron 100 Plus   Melatonin 3 mg   Nystatin cream   Glycolax 17 g   Aspercreme 10% cream        Simran Ren CPhT.  Ext [phone]                .          "

## 2022-02-02 NOTE — ASSESSMENT & PLAN NOTE
70 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 UTI on UA.  Cr at baseline.  Pain to lower abdomen.     Plan  1.  Check PVRs and intermittent cath if >300 cc , patient refuses holbrook  2.  No need for stents currently Cr stable, if pain persists will consider; does not tolerate stents well per pt, pt wishes to avoid  3.  Follow up urine culture  4.  Will see in AM

## 2022-02-02 NOTE — PROGRESS NOTES
[facility]           Pharmacy        Current Drug Shortage     Due to national backorder and [facility] is critically low on inventory of Dextrose 50% (D50) Syringes and Vials, pharmacy has automatically switched from D50% to D10% IVPB at the equivalent dose until resolution of the shortage per P&T approved protocol.               Vidhi Carroll, PharmD  [phone]

## 2022-02-02 NOTE — ASSESSMENT & PLAN NOTE
Chronic, controlled.  Latest blood pressure and vitals reviewed-   Temp:  [98 °F (36.7 °C)-98.4 °F (36.9 °C)]   Pulse:  []   Resp:  [16-20]   BP: (156-185)/(78-86)   SpO2:  [97 %-100 %] .   Home meds for hypertension were reviewed and noted below.   Hypertension Medications             losartan (COZAAR) 50 MG tablet Take 1 tablet (50 mg total) by mouth once daily.    metoprolol tartrate (LOPRESSOR) 25 MG tablet Take 1 tablet (25 mg total) by mouth 2 (two) times daily.          While in the hospital, will manage blood pressure as follows; Continue home antihypertensive regimen    Will utilize p.r.n. blood pressure medication only if patient's blood pressure greater than  180/110 and she develops symptoms such as worsening chest pain or shortness of breath.       18-Jan-2020 01:27

## 2022-02-02 NOTE — ED PROVIDER NOTES
Encounter Date: 2022    SCRIBE #1 NOTE: I, Sebastian Spencer, am scribing for, and in the presence of,  Jaclyn Villalta MD. I have scribed the following portions of the note - Other sections scribed: HPI, ROS, PE.       History     Chief Complaint   Patient presents with    Fatigue     Pt arrives via EMS with c/o generalized fatigue and weakness that started today. Pt is awake, alert oriented; ambulates with use of walker; vitals stable; respirations equal and unlabored; NAD.     Patito Domingo is a 71 y.o. female with a PMHx of colon cancer, arthritis, hypertension, and kidney stones who presents to the ED for evaluation of generalized fatigue, onset today.  Pt notes that she ambulates with a walker, though this is painful, and she is much weaker than normal unable to tolerate at.   Pt notes that her current symptoms feel worse than normal.  Pt also notes a headache, abdominal pain, light headedness in the morning, and weakness.  She reports she has never had symptoms this severe before in came to the ER for further evaluation.    The history is provided by the patient. No  was used.     Review of patient's allergies indicates:   Allergen Reactions    Contrast media Hives, Itching and Swelling    Epinephrine Anaphylaxis     Can cause  a Carcinoid Crisis    Ibuprofen Hives, Itching and Swelling    Iodinated contrast media     Sulfa (sulfonamide antibiotics) Hives, Itching and Swelling     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)      Past Surgical History:   Procedure Laterality Date    ABDOMINAL SURGERY      CATARACT EXTRACTION Left 10/2017     SECTION      CHOLECYSTECTOMY       COLON SURGERY      cystoscope      CYSTOSCOPY W/ RETROGRADES Right 10/10/2019    Procedure: CYSTOSCOPY, WITH RETROGRADE PYELOGRAM;  Surgeon: Gen Isbell MD;  Location: [facility] OR;  Service: Urology;  Laterality: Right;    ERCP N/A 11/24/2021    Procedure: ERCP (ENDOSCOPIC RETROGRADE CHOLANGIOPANCREATOGRAPHY);  Surgeon: Jayjay Rivera MD;  Location: [facility] ([address] [place] [address]);  Service: Endoscopy;  Laterality: N/A;    EYE SURGERY      HYSTERECTOMY  5/1996    LITHOTRIPSY      LIVER BIOPSY  9/14    carcinoid    URETEROSCOPY Right 10/10/2019    Procedure: URETEROSCOPY;  Surgeon: Gen Isbell MD;  Location: [facility] OR;  Service: Urology;  Laterality: Right;    UTERINE FIBROID SURGERY       Family History   Problem Relation Age of Onset    Cancer Mother         unknown    Alzheimer's disease Father     Stroke Sister     No Known Problems Son     No Known Problems Son     No Known Problems Son     No Known Problems Son     Kidney disease Neg Hx      Social History     Tobacco Use    Smoking status: Never Smoker    Smokeless tobacco: Never Used   Substance Use Topics    Alcohol use: No     Alcohol/week: 0.0 standard drinks    Drug use: No     Review of Systems   Constitutional: Positive for fatigue.   Gastrointestinal: Positive for abdominal pain.   Neurological: Positive for weakness, light-headedness and headaches.   All other systems reviewed and are negative.      Physical Exam     Initial Vitals [02/02/22 0322]   BP Pulse Resp Temp SpO2   (!) 156/86 68 16 98.4 °F (36.9 °C) 100 %      MAP       --         Physical Exam    Nursing note and vitals reviewed.  Constitutional: Vital signs are normal.   Elderly.  Chronically ill-appearing  Moderate distress due to pain.   HENT:   Head: Normocephalic and atraumatic.   Eyes: Conjunctivae are normal. Pupils are equal, round, and reactive to light.   Cardiovascular: Normal rate, regular rhythm and normal heart sounds.   Pulmonary/Chest: Breath sounds  normal. No respiratory distress.   Abdominal: Abdomen is soft. There is abdominal tenderness.   Diffuse abdominal tenderness, appears worse in  epigastrium.   Musculoskeletal:         General: Normal range of motion.     Neurological: She is alert and oriented to person, place, and time. She has normal strength. She is not disoriented. GCS score is 15. GCS eye subscore is 4. GCS verbal subscore is 5. GCS motor subscore is 6.   Skin: Skin is warm and dry. Capillary refill takes less than 2 seconds.   Psychiatric: She has a normal mood and affect. Thought content normal.         ED Course   Procedures  Labs Reviewed   CBC W/ AUTO DIFFERENTIAL - Abnormal; Notable for the following components:       Result Value    RBC 3.56 (*)     Hemoglobin 9.9 (*)     Hematocrit 31.2 (*)     MCHC 31.7 (*)     All other components within normal limits   COMPREHENSIVE METABOLIC PANEL - Abnormal; Notable for the following components:    CO2 21 (*)     ALT <5 (*)     All other components within normal limits   URINALYSIS - Abnormal; Notable for the following components:    Color, UA Colorless (*)     Nitrite, UA Positive (*)     Leukocytes, UA 1+ (*)     All other components within normal limits   URINALYSIS MICROSCOPIC - Abnormal; Notable for the following components:    WBC, UA 13 (*)     All other components within normal limits   B-TYPE NATRIURETIC PEPTIDE   LACTIC ACID, PLASMA   LIPASE   MAGNESIUM   TROPONIN I   TSH   C-REACTIVE PROTEIN   PROCALCITONIN   URIC ACID   SARS-COV-2 RDRP GENE          Imaging Results           CT Abdomen Pelvis With Contrast (Final result)  Result time 02/02/22 09:17:18    Final result by Alfa Grant MD (02/02/22 09:17:18)                 Impression:      Mild emphysematous changes of the lung parenchyma.    Dilatation of the biliary channels throughout the liver parenchyma which was seen previously on the examination obtained in November 2021.    Bilateral hydronephrosis and hydroureter however  there is no obvious obstructing kidney stone seen within the ureter on either side.  The level of hydronephrosis and hydroureter has increased as compared to the previous examination.    Large calcifications involving the inferior collecting system of each kidney.    This report was flagged in Epic as abnormal.      Electronically signed by: Alfa Grant  Date:    02/02/2022  Time:    09:17             Narrative:    EXAMINATION:  CT ABDOMEN PELVIS WITH CONTRAST    CLINICAL HISTORY:  Abdominal pain, acute, nonlocalized;hx of carcinoid tumor;    TECHNIQUE:  Low dose axial images, sagittal and coronal reformations were obtained from the lung bases to the pubic symphysis following the IV administration of 75 mL of Omnipaque 350    COMPARISON:  November 20, 2021    FINDINGS:  The inferior aspect of the lung parenchyma shows mild interstitial scarring of the lung tissue.  The inferior aspect of the heart is unremarkable.    Imaging below the diaphragm reveals that there is dilatation of the biliary channels within the liver.  The patient is status post cholecystectomy.  The common bile duct is dilated to approximately 11 mm.  There is a small area of calcification involving the inferior aspect of the left lobe of the liver.  The pancreas appears to be within normal limits.  No indication of dilatation of the pancreatic duct.    The adrenal glands and spleen are within normal limits.    Both kidneys feature hydronephrosis.  The right kidney features a large staghorn like calcification occupying the inferior collecting system.  Image number 81 of series 601 shows a calcification of 2.4 x 1.8 cm.  Image number 75 of series 601 and image number 91 of series 2 shows a calcification in the inferior collecting system on the left of 9.2 x 6.3 cm.  Evaluation of the more distal aspects of the ureters does not reveal evidence of a stone within the lumen of either and does not appear to be evidence of a stone within the lumen of  the bladder.    No indication of free air free fluid within the peritoneal cavity.  No evidence of mesenteric or retroperitoneal lymphadenopathy.    The stomach, small bowel and large bowel all appear to be within normal limits.  There is stool appreciated the large bowel.  The appendix is not readily identifiable in today's examination.  The patient is status post hysterectomy.  The rectum is unremarkable.                               X-Ray Chest 1 View (Final result)  Result time 02/02/22 06:25:41    Final result by Roby Brice MD (02/02/22 06:25:41)                 Impression:      Mild cardiomegaly.  No significant detrimental interval change in the appearance of the chest since 11/20/2021.      Electronically signed by: Roby Brice MD  Date:    02/02/2022  Time:    06:25             Narrative:    EXAMINATION:  XR CHEST 1 VIEW    COMPARISON:  Comparison is made to 11/20/2021, 11/09/2021, and 10/05/2021.    FINDINGS:  Even allowing for magnification of the cardiomediastinal silhouette related to projection, the heart appears minimally enlarged, though the cardiomediastinal silhouette demonstrates no detrimental change since the examinations referenced above.  Pulmonary vascularity is normal, and there are no findings indicating current cardiac decompensation.  Lung zones appear essentially clear, and are free of significant airspace consolidation or volume loss.  No pleural fluid.  No pneumothorax.                                 Medications   atorvastatin tablet 40 mg (has no administration in time range)   losartan tablet 50 mg (50 mg Oral Given 2/2/22 1429)   magnesium oxide tablet 400 mg (has no administration in time range)   metoprolol tartrate (LOPRESSOR) tablet 25 mg (has no administration in time range)   oxyCODONE immediate release tablet 15 mg (15 mg Oral Given 2/2/22 1802)   sodium chloride 0.9% flush 10 mL (has no administration in time range)   potassium bicarbonate disintegrating tablet 50 mEq  (has no administration in time range)   potassium bicarbonate disintegrating tablet 35 mEq (has no administration in time range)   potassium bicarbonate disintegrating tablet 60 mEq (has no administration in time range)   magnesium oxide tablet 800 mg (has no administration in time range)   magnesium oxide tablet 800 mg (has no administration in time range)   potassium, sodium phosphates 280-160-250 mg packet 2 packet (has no administration in time range)   potassium, sodium phosphates 280-160-250 mg packet 2 packet (has no administration in time range)   potassium, sodium phosphates 280-160-250 mg packet 2 packet (has no administration in time range)   senna-docusate 8.6-50 mg per tablet 1 tablet (has no administration in time range)   prochlorperazine injection Soln 5 mg (has no administration in time range)   glucose chewable tablet 16 g (has no administration in time range)   glucose chewable tablet 24 g (has no administration in time range)   glucagon (human recombinant) injection 1 mg (has no administration in time range)   albuterol-ipratropium 2.5 mg-0.5 mg/3 mL nebulizer solution 3 mL (has no administration in time range)   acetaminophen tablet 650 mg (has no administration in time range)   melatonin tablet 6 mg (has no administration in time range)   simethicone chewable tablet 80 mg (has no administration in time range)   aluminum-magnesium hydroxide-simethicone 200-200-20 mg/5 mL suspension 30 mL (has no administration in time range)   naloxone 0.4 mg/mL injection 0.02 mg (has no administration in time range)   enoxaparin injection 40 mg (40 mg Subcutaneous Given 2/2/22 1802)   meropenem 500 mg in sodium chloride 0.9 % 100 mL IVPB (ready to mix system) (0 mg Intravenous Stopped 2/2/22 1903)   dextrose 10% bolus 125 mL (has no administration in time range)   dextrose 10% bolus 250 mL (has no administration in time range)   sodium chloride 0.9% bolus 1,000 mL (0 mLs Intravenous Stopped 2/2/22 0912)    HYDROmorphone injection 1 mg (1 mg Intravenous Given 2/2/22 0615)   ondansetron injection 4 mg (4 mg Intravenous Given 2/2/22 0613)   diphenhydrAMINE injection 50 mg (50 mg Intravenous Given 2/2/22 0723)   methylPREDNISolone sodium succinate injection 80 mg (80 mg Intravenous Given 2/2/22 0723)   iohexoL (OMNIPAQUE 350) injection 75 mL (75 mLs Intravenous Given 2/2/22 0835)   fentaNYL 50 mcg/mL injection 100 mcg (100 mcg Intravenous Given 2/2/22 0902)   meropenem 500 mg in sodium chloride 0.9 % 100 mL IVPB (ready to mix system) (0 mg Intravenous Stopped 2/2/22 1141)     Medical Decision Making:   History:   Old Medical Records: I decided to obtain old medical records.  Initial Assessment:   71-year-old female, history of malignant carcinoid, metastasis, on oral chemotherapy, seen by [facility] neuroendocrine team presents the ER for evaluation of worsening fatigue and abdominal pain.  Onset a few days progressively worsening.  Patient also endorses extreme weakness, difficulty functioning ambulating with walker which prompted to call 911.  Patient reports abdominal pain which is different from her normal cancer pain.  She denies ever having symptoms this severe before.  She was tearful, appears in moderate distress from pain.  Differential includes obstruction, abdominal perforation, breakthrough cancer pain, pancreatitis, other cause.  Will plan blood work symptomatic support CT scan, low threshold for admission.  Independently Interpreted Test(s):   I have ordered and independently interpreted X-rays - see prior notes.  Clinical Tests:   Lab Tests: Reviewed and Ordered  The following lab test(s) were unremarkable: CBC, Troponin, BNP and CMP  Radiological Study: Ordered and Reviewed          Scribe Attestation:   Scribe #1: I performed the above scribed service and the documentation accurately describes the services I performed. I attest to the accuracy of the note.        ED Course as of 02/02/22 2106 Wed Feb 02,  2022   0600 Resting in bed no acute distress to 6am physician or follow-up reassess and dispo.  [SE]      ED Course User Index  [SE] Jaclyn Villalta MD             My Scribe Attestation: I acknowledge that the documentation on this chart was provided by described on the date of service noted above and that the documentation in the chart accurately reflects work and decisions made by me alone.      Clinical Impression:   Final diagnoses:  [R53.1] Weakness  [N39.0, R31.9] Urinary tract infection with hematuria, site unspecified (Primary)  [A49.9, Z16.12] ESBL (extended spectrum beta-lactamase) producing bacteria infection  [N13.30] Hydronephrosis, unspecified hydronephrosis type          ED Disposition Condition    Observation               Jaclyn Villalta MD  02/02/22 1566

## 2022-02-02 NOTE — CONSULTS
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  States more pain than baseline, has chronic pain.  Fatigue worsening for days.  Denies hematuria, reports frequency and urgency of urination and what seems like higher urine volumes.  H/o stones in past and follows with OSH urologist with intervention in past, but states larger stones specifically on right are chronic.  She reports some flank and lower abd/epigastric pain.  CT showed bilateral hydronephrosis to distended bladder.  Pt reports voided after CT scan.  No fever or chills.  Urology consulted for bilateral hydro, abdominal pain.     She also states has had stents in past and does not tolerate.  Was at time of stone procedures in past.          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)        Past Surgical History:   Procedure Laterality Date    ABDOMINAL SURGERY      CATARACT EXTRACTION Left 10/2017     SECTION      CHOLECYSTECTOMY      COLON SURGERY      cystoscope      CYSTOSCOPY W/ RETROGRADES Right 10/10/2019    Procedure: CYSTOSCOPY, WITH  RETROGRADE PYELOGRAM;  Surgeon: Gen Isbell MD;  Location: [facility] OR;  Service: Urology;  Laterality: Right;    ERCP N/A 11/24/2021    Procedure: ERCP (ENDOSCOPIC RETROGRADE CHOLANGIOPANCREATOGRAPHY);  Surgeon: Jayjay Rivera MD;  Location: [facility] ENDO (2ND FLR);  Service: Endoscopy;  Laterality: N/A;    EYE SURGERY      HYSTERECTOMY  5/1996    LITHOTRIPSY      LIVER BIOPSY  9/14    carcinoid    URETEROSCOPY Right 10/10/2019    Procedure: URETEROSCOPY;  Surgeon: Gen Isbell MD;  Location: [facility] OR;  Service: Urology;  Laterality: Right;    UTERINE FIBROID SURGERY         Review of patient's allergies indicates:   Allergen Reactions    Contrast media Hives, Itching and Swelling    Epinephrine Anaphylaxis     Can cause  a Carcinoid Crisis    Ibuprofen Hives, Itching and Swelling    Iodinated contrast media     Sulfa (sulfonamide antibiotics) Hives, Itching and Swelling       Family History     Problem Relation (Age of Onset)    Alzheimer's disease Father    Cancer Mother    No Known Problems Son, Son, Son, Son    Stroke Sister          Tobacco Use    Smoking status: Never Smoker    Smokeless tobacco: Never Used   Substance and Sexual Activity    Alcohol use: No     Alcohol/week: 0.0 standard drinks    Drug use: No    Sexual activity: Not Currently       Review of Systems   Constitutional: Positive for activity change, appetite change and fatigue. Negative for chills and fever.   Respiratory: Negative for cough and shortness of breath.    Cardiovascular: Negative for chest pain.   Gastrointestinal: Positive for abdominal pain. Negative for abdominal distention, anal bleeding, nausea and vomiting.   Genitourinary: Positive for difficulty urinating, frequency and urgency. Negative for hematuria.   Neurological: Positive for dizziness and weakness. Negative for seizures.         Objective:     Temp:  [98 °F (36.7 °C)-98.4 °F (36.9 °C)] 98.3 °F (36.8 °C)  Pulse:  [] 100  Resp:  [16-20]  20  SpO2:  [97 %-100 %] 97 %  BP: (156-185)/(78-86) 179/78     Body mass index is 27.44 kg/m².          Physical Exam  Constitutional:       Appearance: Normal appearance.   HENT:      Head: Normocephalic.   Cardiovascular:      Pulses: Normal pulses.   Pulmonary:      Effort: Pulmonary effort is normal.   Abdominal:      General: Abdomen is flat.      Palpations: Abdomen is soft.      Tenderness: There is abdominal tenderness (mild epigastric/lower abd).   Musculoskeletal:      Cervical back: Normal range of motion.   Skin:     General: Skin is warm.   Neurological:      General: No focal deficit present.      Mental Status: She is alert.   Physical Exam    Significant Labs:    BMP:  Recent Labs   Lab 02/02/22  0602      K 3.8      CO2 21*   BUN 15   CREATININE 0.9   CALCIUM 10.0       CBC:  Recent Labs   Lab 02/02/22  0602   WBC 5.75   HGB 9.9*   HCT 31.2*          Urinalysis  Recent Labs   Lab 02/02/22  0915   COLORU Colorless*   SPECGRAV 1.020   PHUR 6.0   PROTEINUA Negative   BACTERIA Rare   NITRITE Positive*   LEUKOCYTESUR 1+*   UROBILINOGEN Negative         Significant Imaging:  CT: I have reviewed all results within the past 24 hours and my personal findings are:  bilateral hydro to bladder, R>left lower pole stones no causing obstruction, distended bladder     Results for orders placed or performed during the hospital encounter of 02/02/22 (from the past 2160 hour(s))   CT Abdomen Pelvis With Contrast    Impression    Mild emphysematous changes of the lung parenchyma.    Dilatation of the biliary channels throughout the liver parenchyma which was seen previously on the examination obtained in November 2021.    Bilateral hydronephrosis and hydroureter however there is no obvious obstructing kidney stone seen within the ureter on either side.  The level of hydronephrosis and hydroureter has increased as compared to the previous examination.    Large calcifications involving the inferior  collecting system of each kidney.    This report was flagged in Epic as abnormal.      Electronically signed by: Alfa Grant  Date:    02/02/2022  Time:    09:17   Results for orders placed or performed during the hospital encounter of 01/08/22 (from the past 2160 hour(s))   CT Head Without Contrast    Impression    No CT evidence of acute intracranial abnormality.    Chronic ischemic changes.      Electronically signed by: Matias Padron MD  Date:    01/08/2022  Time:    23:21   Results for orders placed or performed during the hospital encounter of 11/20/21 (from the past 2160 hour(s))   CT Abdomen Pelvis  Without Contrast    Impression    1. Intra and extrahepatic biliary ductal dilation which may be due in part to cholecystectomy changes, with multiple filling defects in the CBD which may represent choledocholithiasis or sludge.  This was also seen on multiple prior examinations.  If clinically indicated, further evaluation with MRCP or ERCP should be considered.  2. Stable partially calcified mass within the right lower quadrant mesentery.  3. Numerous stable calcified hepatic masses.  4. Bilateral nonobstructing nephrolithiasis.  No hydronephrosis.      Electronically signed by: Bennie Kiser  Date:    11/20/2021  Time:    13:49     *Note: Due to a large number of results and/or encounters for the requested time period, some results have not been displayed. A complete set of results can be found in Results Review.             Assessment and Plan:     Other hydronephrosis  70 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 UTI on UA.  Cr at baseline.  Pain to lower abdomen.     Plan  1.  Check PVRs and intermittent cath if >300 cc , patient refuses holbrook  2.  No need for stents currently Cr stable, if pain persists will consider; does not tolerate stents well per pt, pt wishes to avoid  3.  Follow up urine culture  4.  Will see in  AM    Staghorn calculus  Stable chronic    Urinary tract infection without hematuria  Continue antibiotic follow up culture      Thank you for your consult. I will follow-up with patient. Please contact us if you have any additional questions.    Ervin Parikh MD  Urology  [facility] - Emergency Dept

## 2022-02-02 NOTE — SUBJECTIVE & OBJECTIVE
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)        Past Surgical History:   Procedure Laterality Date    ABDOMINAL SURGERY      CATARACT EXTRACTION Left 10/2017     SECTION      CHOLECYSTECTOMY      COLON SURGERY      cystoscope      CYSTOSCOPY W/ RETROGRADES Right 10/10/2019    Procedure: CYSTOSCOPY, WITH RETROGRADE PYELOGRAM;  Surgeon: Gen Isbell MD;  Location: [facility];  Service: Urology;  Laterality: Right;    ERCP N/A 2021    Procedure: ERCP (ENDOSCOPIC RETROGRADE CHOLANGIOPANCREATOGRAPHY);  Surgeon: Jayjay Rivera MD;  Location: [address]);  Service: Endoscopy;  Laterality: N/A;    EYE SURGERY      HYSTERECTOMY  1996    LITHOTRIPSY      LIVER BIOPSY      carcinoid    URETEROSCOPY Right 10/10/2019    Procedure: URETEROSCOPY;  Surgeon: Gen Isbell MD;  Location: [facility];  Service: Urology;  Laterality: Right;    UTERINE FIBROID SURGERY         Review of patient's allergies indicates:   Allergen Reactions    Contrast media Hives, Itching and Swelling    Epinephrine Anaphylaxis     Can cause  a Carcinoid Crisis    Ibuprofen Hives, Itching and Swelling    Iodinated contrast media     Sulfa (sulfonamide antibiotics) Hives, Itching and Swelling       Family History     Problem Relation (Age of Onset)    Alzheimer's disease Father    Cancer Mother    No Known Problems Son, Son, Son, Son    Stroke Sister          Tobacco Use    Smoking status: Never Smoker    Smokeless tobacco: Never Used   Substance and Sexual Activity    Alcohol use: No     Alcohol/week: 0.0 standard drinks    Drug use: No    Sexual activity: Not Currently       Review of Systems    Constitutional: Positive for activity change, appetite change and fatigue. Negative for chills and fever.   Respiratory: Negative for cough and shortness of breath.    Cardiovascular: Negative for chest pain.   Gastrointestinal: Positive for abdominal pain. Negative for abdominal distention, anal bleeding, nausea and vomiting.   Genitourinary: Positive for difficulty urinating, frequency and urgency. Negative for hematuria.   Neurological: Positive for dizziness and weakness. Negative for seizures.         Objective:     Temp:  [98 °F (36.7 °C)-98.4 °F (36.9 °C)] 98.3 °F (36.8 °C)  Pulse:  [] 100  Resp:  [16-20] 20  SpO2:  [97 %-100 %] 97 %  BP: (156-185)/(78-86) 179/78     Body mass index is 27.44 kg/m².          Physical Exam  Constitutional:       Appearance: Normal appearance.   HENT:      Head: Normocephalic.   Cardiovascular:      Pulses: Normal pulses.   Pulmonary:      Effort: Pulmonary effort is normal.   Abdominal:      General: Abdomen is flat.      Palpations: Abdomen is soft.      Tenderness: There is abdominal tenderness (mild epigastric/lower abd).   Musculoskeletal:      Cervical back: Normal range of motion.   Skin:     General: Skin is warm.   Neurological:      General: No focal deficit present.      Mental Status: She is alert.   Physical Exam    Significant Labs:    BMP:  Recent Labs   Lab 02/02/22  0602      K 3.8      CO2 21*   BUN 15   CREATININE 0.9   CALCIUM 10.0       CBC:  Recent Labs   Lab 02/02/22  0602   WBC 5.75   HGB 9.9*   HCT 31.2*          Urinalysis  Recent Labs   Lab 02/02/22  0915   COLORU Colorless*   SPECGRAV 1.020   PHUR 6.0   PROTEINUA Negative   BACTERIA Rare   NITRITE Positive*   LEUKOCYTESUR 1+*   UROBILINOGEN Negative         Significant Imaging:  CT: I have reviewed all results within the past 24 hours and my personal findings are:  bilateral hydro to bladder, R>left lower pole stones no causing obstruction, distended bladder     Results for  orders placed or performed during the hospital encounter of 02/02/22 (from the past 2160 hour(s))   CT Abdomen Pelvis With Contrast    Impression    Mild emphysematous changes of the lung parenchyma.    Dilatation of the biliary channels throughout the liver parenchyma which was seen previously on the examination obtained in November 2021.    Bilateral hydronephrosis and hydroureter however there is no obvious obstructing kidney stone seen within the ureter on either side.  The level of hydronephrosis and hydroureter has increased as compared to the previous examination.    Large calcifications involving the inferior collecting system of each kidney.    This report was flagged in Epic as abnormal.      Electronically signed by: Alfa Grant  Date:    02/02/2022  Time:    09:17   Results for orders placed or performed during the hospital encounter of 01/08/22 (from the past 2160 hour(s))   CT Head Without Contrast    Impression    No CT evidence of acute intracranial abnormality.    Chronic ischemic changes.      Electronically signed by: Matias Padron MD  Date:    01/08/2022  Time:    23:21   Results for orders placed or performed during the hospital encounter of 11/20/21 (from the past 2160 hour(s))   CT Abdomen Pelvis  Without Contrast    Impression    1. Intra and extrahepatic biliary ductal dilation which may be due in part to cholecystectomy changes, with multiple filling defects in the CBD which may represent choledocholithiasis or sludge.  This was also seen on multiple prior examinations.  If clinically indicated, further evaluation with MRCP or ERCP should be considered.  2. Stable partially calcified mass within the right lower quadrant mesentery.  3. Numerous stable calcified hepatic masses.  4. Bilateral nonobstructing nephrolithiasis.  No hydronephrosis.      Electronically signed by: Bennie Kiser  Date:    11/20/2021  Time:    13:49     *Note: Due to a large number of results and/or encounters for  the requested time period, some results have not been displayed. A complete set of results can be found in Results Review.

## 2022-02-02 NOTE — H&P
[facility] Medicine  History & Physical    Patient Name: Patito Domingo  MRN: [id number]  Patient Class: OP- Observation  Admission Date: 2/2/2022  Attending Physician: Genevieve Hatch NP  Primary Care Provider: Yasir Myers Jr, MD         Patient information was obtained from patient and ER records.     Subjective:     Principal Problem:<principal problem not specified>    Chief Complaint:   Chief Complaint   Patient presents with    Fatigue     Pt arrives via EMS with c/o generalized fatigue and weakness that started today. Pt is awake, alert oriented; ambulates with use of walker; vitals stable; respirations equal and unlabored; NAD.        HPI: Patito Domingo is a 71 y.o. female with a PMH of colon cancer status post chemotherapy in July 2021, arthritis, hypertension, and kidney stones who presents to the ED for evaluation of generalized fatigue, weakness, and suprapubic abdominal pain for 2 days. She states her abdominal pain is presently and 9/10 and was unrelieved with home roxycontin.  She reports nausea. Denies vomiting or diarrhea.  She reports fair appetite.   It is noted patient has chronic abdominal pain however she states her pain is worse than normal and she has had persistent nausea unrelieved with home antiemetics.       CT of the abdomen with contrast demonstrated  Mild emphysematous changes of the lung parenchyma.   Dilatation of the biliary channels throughout the liver parenchyma which was seen previously on the examination obtained in November 2021. Bilateral hydronephrosis and hydroureter however there is no obvious obstructing kidney stone seen within the ureter on either side.  The level of hydronephrosis and hydroureter has increased as compared to the previous examination. Large calcifications involving the inferior collecting system of each kidney.  Patient is noted to have recurrent urinary tract infections secondary to Klebsiella which is multi-drug  resistant.  She was initiated on meropenem in the ED.        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)        Past Surgical History:   Procedure Laterality Date    ABDOMINAL SURGERY      CATARACT EXTRACTION Left 10/2017     SECTION      CHOLECYSTECTOMY      COLON SURGERY      cystoscope      CYSTOSCOPY W/ RETROGRADES Right 10/10/2019    Procedure: CYSTOSCOPY, WITH RETROGRADE PYELOGRAM;  Surgeon: Gen Isbell MD;  Location: [facility];  Service: Urology;  Laterality: Right;    ERCP N/A 2021    Procedure: ERCP (ENDOSCOPIC RETROGRADE CHOLANGIOPANCREATOGRAPHY);  Surgeon: Jayjay Rivera MD;  Location: [address]);  Service: Endoscopy;  Laterality: N/A;    EYE SURGERY      HYSTERECTOMY  1996    LITHOTRIPSY      LIVER BIOPSY      carcinoid    URETEROSCOPY Right 10/10/2019    Procedure: URETEROSCOPY;  Surgeon: Gen Isbell MD;  Location: [facility];  Service: Urology;  Laterality: Right;    UTERINE FIBROID SURGERY         Review of patient's allergies indicates:   Allergen Reactions    Contrast media Hives, Itching and Swelling    Epinephrine Anaphylaxis     Can cause  a Carcinoid Crisis    Ibuprofen Hives, Itching and Swelling    Iodinated contrast media     Sulfa (sulfonamide antibiotics) Hives, Itching and Swelling       No current facility-administered medications on file prior to encounter.     Current Outpatient Medications on File Prior to Encounter   Medication Sig    atorvastatin (LIPITOR) 40 MG tablet Take 1 tablet (40 mg total) by mouth every evening.    cyanocobalamin (VITAMIN B-12) 1000 MCG tablet Take 1 tablet (1,000 mcg total) by mouth once daily.     losartan (COZAAR) 50 MG tablet Take 1 tablet (50 mg total) by mouth once daily.    magnesium oxide (MAG-OX) 400 mg (241.3 mg magnesium) tablet Take 1 tablet (400 mg total) by mouth 2 (two) times daily.    meclizine (ANTIVERT) 25 mg tablet TAKE 1 TABLET(25 MG) BY MOUTH THREE TIMES DAILY AS NEEDED FOR DIZZINESS    metoprolol tartrate (LOPRESSOR) 25 MG tablet Take 1 tablet (25 mg total) by mouth 2 (two) times daily.    oxyCODONE (ROXICODONE) 15 MG Tab Take 15 mg by mouth every 4 (four) hours as needed (chronic abdominal pain, malignancy related).    alcohol swabs (BD ALCOHOL SWABS) PadM Apply 1 each topically as needed.    [DISCONTINUED] iron-vit c-b12-folic acid (IRON 100 PLUS) Tab Take 1 tablet by mouth once daily.    [DISCONTINUED] melatonin (MELATIN) 3 mg tablet Take 2 tablets (6 mg total) by mouth nightly as needed for Insomnia.    [DISCONTINUED] nystatin (MYCOSTATIN) cream Apply topically 2 (two) times daily. (Patient taking differently: Apply topically 2 (two) times daily. Intertriginous areas)    [DISCONTINUED] polyethylene glycol (GLYCOLAX) 17 gram PwPk Take 17 g by mouth 2 (two) times daily as needed.    [DISCONTINUED] trolamine salicylate (ASPERCREME) 10 % cream Apply 1 application topically as needed (extremity/joint pain).     Family History     Problem Relation (Age of Onset)    Alzheimer's disease Father    Cancer Mother    No Known Problems Son, Son, Son, Son    Stroke Sister        Tobacco Use    Smoking status: Never Smoker    Smokeless tobacco: Never Used   Substance and Sexual Activity    Alcohol use: No     Alcohol/week: 0.0 standard drinks    Drug use: No    Sexual activity: Not Currently     Review of Systems   Constitutional: Positive for activity change, appetite change and fatigue. Negative for diaphoresis and fever.   HENT: Positive for dental problem. Negative for congestion.    Eyes: Negative for photophobia and visual disturbance.   Respiratory: Negative for cough and  shortness of breath.    Cardiovascular: Negative for chest pain and leg swelling.   Gastrointestinal: Positive for abdominal pain (suprapubic) and nausea. Negative for abdominal distention.   Endocrine: Negative for polyphagia and polyuria.   Genitourinary: Positive for dysuria and frequency.   Musculoskeletal: Positive for arthralgias. Negative for gait problem.   Skin: Negative for color change and rash.   Neurological: Negative for speech difficulty and numbness.   Psychiatric/Behavioral: Negative for agitation and decreased concentration.     Objective:     Vital Signs (Most Recent):  Temp: 98.3 °F (36.8 °C) (02/02/22 1148)  Pulse: 100 (02/02/22 1148)  Resp: 18 (02/02/22 1148)  BP: (!) 179/78 (02/02/22 1148)  SpO2: 97 % (02/02/22 1148) Vital Signs (24h Range):  Temp:  [98 °F (36.7 °C)-98.4 °F (36.9 °C)] 98.3 °F (36.8 °C)  Pulse:  [] 100  Resp:  [16-20] 18  SpO2:  [97 %-100 %] 97 %  BP: (156-185)/(78-86) 179/78     Weight: 77.1 kg (170 lb)  Body mass index is 27.44 kg/m².    Physical Exam  Vitals and nursing note reviewed.   Constitutional:       Appearance: Normal appearance. She is well-developed and well-nourished. She is not ill-appearing.   HENT:      Head: Normocephalic and atraumatic.      Right Ear: External ear normal.      Left Ear: External ear normal.      Nose: Nose normal.      Mouth/Throat:      Mouth: Oropharynx is clear and moist. Mucous membranes are moist.      Comments: Poor dentition  Eyes:      Extraocular Movements: EOM normal.      Conjunctiva/sclera: Conjunctivae normal.      Pupils: Pupils are equal, round, and reactive to light.   Cardiovascular:      Rate and Rhythm: Normal rate and regular rhythm.      Pulses: Intact distal pulses.      Heart sounds: Normal heart sounds.   Pulmonary:      Effort: Pulmonary effort is normal.      Breath sounds: Normal breath sounds.   Abdominal:      General: Bowel sounds are normal. There is no distension.      Palpations: Abdomen is soft. There  is no mass.      Tenderness: There is abdominal tenderness. There is guarding (Suprapubic tenderness).   Musculoskeletal:         General: Normal range of motion.      Cervical back: Normal range of motion and neck supple.   Skin:     General: Skin is warm and dry.   Neurological:      General: No focal deficit present.      Mental Status: She is alert and oriented to person, place, and time. Mental status is at baseline.      Comments: Generalized weakness   Psychiatric:         Mood and Affect: Mood and affect normal.         Behavior: Behavior normal.         Judgment: Judgment normal.      Comments:  tearful during conversation.           CRANIAL NERVES     CN III, IV, VI   Pupils are equal, round, and reactive to light.  Extraocular motions are normal.        Significant Labs:   All pertinent labs within the past 24 hours have been reviewed.  BMP:   Recent Labs   Lab 02/02/22  0602         K 3.8      CO2 21*   BUN 15   CREATININE 0.9   CALCIUM 10.0   MG 1.8     CBC:   Recent Labs   Lab 02/02/22  0602   WBC 5.75   HGB 9.9*   HCT 31.2*          Significant Imaging: I have reviewed all pertinent imaging results/findings within the past 24 hours.    Assessment/Plan:     Essential hypertension  Chronic, controlled.  Latest blood pressure and vitals reviewed-   Temp:  [98 °F (36.7 °C)-98.4 °F (36.9 °C)]   Pulse:  []   Resp:  [16-20]   BP: (156-185)/(78-86)   SpO2:  [97 %-100 %] .   Home meds for hypertension were reviewed and noted below.   Hypertension Medications             losartan (COZAAR) 50 MG tablet Take 1 tablet (50 mg total) by mouth once daily.    metoprolol tartrate (LOPRESSOR) 25 MG tablet Take 1 tablet (25 mg total) by mouth 2 (two) times daily.          While in the hospital, will manage blood pressure as follows; Continue home antihypertensive regimen    Will utilize p.r.n. blood pressure medication only if patient's blood pressure greater than  180/110 and she develops  symptoms such as worsening chest pain or shortness of breath.        Chronic pain syndrome    Resume home opioids.    Iron deficiency anemia secondary to inadequate dietary iron intake  Patient's anemia is currently controlled. Has not received any PRBCs to date.. Etiology likely d/t iron def  Current CBC reviewed-   Lab Results   Component Value Date    HGB 9.9 (L) 02/02/2022    HCT 31.2 (L) 02/02/2022     Monitor serial CBC and transfuse if patient becomes hemodynamically unstable, symptomatic or H/H drops below 7/21.          Urinary tract infection without hematuria   Patient with positive nitrates although WBCs 13. Urine culture pending.  Initiated on meropenem based on previous culture which demonstrated multi drug-resistant Klebsiella.  Consult Urology for recurrent UTI and hydronephrosis      Debility  Consult PT and OT    Secondary neuroendocrine tumor of liver  Chronic followed by oncology.  Status post chemotherapy in July of 2021.       VTE Risk Mitigation (From admission, onward)         Ordered     enoxaparin injection 40 mg  Daily         02/02/22 1336     IP VTE HIGH RISK PATIENT  Once         02/02/22 1336     Place sequential compression device  Until discontinued         02/02/22 1336                   Genevieve Hatch NP  Department of Hospital Medicine   [place] - Emergency Dept

## 2022-02-02 NOTE — ASSESSMENT & PLAN NOTE
Patient's anemia is currently controlled. Has not received any PRBCs to date.. Etiology likely d/t iron def  Current CBC reviewed-   Lab Results   Component Value Date    HGB 9.9 (L) 02/02/2022    HCT 31.2 (L) 02/02/2022     Monitor serial CBC and transfuse if patient becomes hemodynamically unstable, symptomatic or H/H drops below 7/21.

## 2022-02-02 NOTE — SUBJECTIVE & OBJECTIVE
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)        Past Surgical History:   Procedure Laterality Date    ABDOMINAL SURGERY      CATARACT EXTRACTION Left 10/2017     SECTION      CHOLECYSTECTOMY      COLON SURGERY      cystoscope      CYSTOSCOPY W/ RETROGRADES Right 10/10/2019    Procedure: CYSTOSCOPY, WITH RETROGRADE PYELOGRAM;  Surgeon: Gen Isbell MD;  Location: [facility] OR;  Service: Urology;  Laterality: Right;    ERCP N/A 2021    Procedure: ERCP (ENDOSCOPIC RETROGRADE CHOLANGIOPANCREATOGRAPHY);  Surgeon: Jayjay Rivera MD;  Location: [address]);  Service: Endoscopy;  Laterality: N/A;    EYE SURGERY      HYSTERECTOMY  1996    LITHOTRIPSY      LIVER BIOPSY      carcinoid    URETEROSCOPY Right 10/10/2019    Procedure: URETEROSCOPY;  Surgeon: Gen Isbell MD;  Location: [facility];  Service: Urology;  Laterality: Right;    UTERINE FIBROID SURGERY         Review of patient's allergies indicates:   Allergen Reactions    Contrast media Hives, Itching and Swelling    Epinephrine Anaphylaxis     Can cause  a Carcinoid Crisis    Ibuprofen Hives, Itching and Swelling    Iodinated contrast media     Sulfa (sulfonamide antibiotics) Hives, Itching and Swelling       No current facility-administered medications on file prior to encounter.     Current Outpatient Medications on File Prior to Encounter   Medication Sig    atorvastatin (LIPITOR) 40 MG tablet Take 1 tablet (40 mg total) by mouth every evening.    cyanocobalamin (VITAMIN B-12) 1000 MCG tablet Take 1 tablet (1,000 mcg total) by mouth once daily.    losartan (COZAAR) 50 MG tablet Take 1 tablet (50 mg total) by mouth  once daily.    magnesium oxide (MAG-OX) 400 mg (241.3 mg magnesium) tablet Take 1 tablet (400 mg total) by mouth 2 (two) times daily.    meclizine (ANTIVERT) 25 mg tablet TAKE 1 TABLET(25 MG) BY MOUTH THREE TIMES DAILY AS NEEDED FOR DIZZINESS    metoprolol tartrate (LOPRESSOR) 25 MG tablet Take 1 tablet (25 mg total) by mouth 2 (two) times daily.    oxyCODONE (ROXICODONE) 15 MG Tab Take 15 mg by mouth every 4 (four) hours as needed (chronic abdominal pain, malignancy related).    alcohol swabs (BD ALCOHOL SWABS) PadM Apply 1 each topically as needed.    [DISCONTINUED] iron-vit c-b12-folic acid (IRON 100 PLUS) Tab Take 1 tablet by mouth once daily.    [DISCONTINUED] melatonin (MELATIN) 3 mg tablet Take 2 tablets (6 mg total) by mouth nightly as needed for Insomnia.    [DISCONTINUED] nystatin (MYCOSTATIN) cream Apply topically 2 (two) times daily. (Patient taking differently: Apply topically 2 (two) times daily. Intertriginous areas)    [DISCONTINUED] polyethylene glycol (GLYCOLAX) 17 gram PwPk Take 17 g by mouth 2 (two) times daily as needed.    [DISCONTINUED] trolamine salicylate (ASPERCREME) 10 % cream Apply 1 application topically as needed (extremity/joint pain).     Family History     Problem Relation (Age of Onset)    Alzheimer's disease Father    Cancer Mother    No Known Problems Son, Son, Son, Son    Stroke Sister        Tobacco Use    Smoking status: Never Smoker    Smokeless tobacco: Never Used   Substance and Sexual Activity    Alcohol use: No     Alcohol/week: 0.0 standard drinks    Drug use: No    Sexual activity: Not Currently     Review of Systems   Constitutional: Positive for activity change, appetite change and fatigue. Negative for diaphoresis and fever.   HENT: Positive for dental problem. Negative for congestion.    Eyes: Negative for photophobia and visual disturbance.   Respiratory: Negative for cough and shortness of breath.    Cardiovascular: Negative for chest pain and leg  swelling.   Gastrointestinal: Positive for abdominal pain (suprapubic) and nausea. Negative for abdominal distention.   Endocrine: Negative for polyphagia and polyuria.   Genitourinary: Positive for dysuria and frequency.   Musculoskeletal: Positive for arthralgias. Negative for gait problem.   Skin: Negative for color change and rash.   Neurological: Negative for speech difficulty and numbness.   Psychiatric/Behavioral: Negative for agitation and decreased concentration.     Objective:     Vital Signs (Most Recent):  Temp: 98.3 °F (36.8 °C) (02/02/22 1148)  Pulse: 100 (02/02/22 1148)  Resp: 18 (02/02/22 1148)  BP: (!) 179/78 (02/02/22 1148)  SpO2: 97 % (02/02/22 1148) Vital Signs (24h Range):  Temp:  [98 °F (36.7 °C)-98.4 °F (36.9 °C)] 98.3 °F (36.8 °C)  Pulse:  [] 100  Resp:  [16-20] 18  SpO2:  [97 %-100 %] 97 %  BP: (156-185)/(78-86) 179/78     Weight: 77.1 kg (170 lb)  Body mass index is 27.44 kg/m².    Physical Exam  Vitals and nursing note reviewed.   Constitutional:       Appearance: Normal appearance. She is well-developed and well-nourished. She is not ill-appearing.   HENT:      Head: Normocephalic and atraumatic.      Right Ear: External ear normal.      Left Ear: External ear normal.      Nose: Nose normal.      Mouth/Throat:      Mouth: Oropharynx is clear and moist. Mucous membranes are moist.      Comments: Poor dentition  Eyes:      Extraocular Movements: EOM normal.      Conjunctiva/sclera: Conjunctivae normal.      Pupils: Pupils are equal, round, and reactive to light.   Cardiovascular:      Rate and Rhythm: Normal rate and regular rhythm.      Pulses: Intact distal pulses.      Heart sounds: Normal heart sounds.   Pulmonary:      Effort: Pulmonary effort is normal.      Breath sounds: Normal breath sounds.   Abdominal:      General: Bowel sounds are normal. There is no distension.      Palpations: Abdomen is soft. There is no mass.      Tenderness: There is abdominal tenderness. There is  guarding (Suprapubic tenderness).   Musculoskeletal:         General: Normal range of motion.      Cervical back: Normal range of motion and neck supple.   Skin:     General: Skin is warm and dry.   Neurological:      General: No focal deficit present.      Mental Status: She is alert and oriented to person, place, and time. Mental status is at baseline.      Comments: Generalized weakness   Psychiatric:         Mood and Affect: Mood and affect normal.         Behavior: Behavior normal.         Judgment: Judgment normal.      Comments:  tearful during conversation.           CRANIAL NERVES     CN III, IV, VI   Pupils are equal, round, and reactive to light.  Extraocular motions are normal.        Significant Labs:   All pertinent labs within the past 24 hours have been reviewed.  BMP:   Recent Labs   Lab 02/02/22  0602         K 3.8      CO2 21*   BUN 15   CREATININE 0.9   CALCIUM 10.0   MG 1.8     CBC:   Recent Labs   Lab 02/02/22  0602   WBC 5.75   HGB 9.9*   HCT 31.2*          Significant Imaging: I have reviewed all pertinent imaging results/findings within the past 24 hours.

## 2022-02-02 NOTE — HPI
Patito Domingo is a 71 y.o. female with a PMH of colon cancer status post chemotherapy in July 2021, arthritis, hypertension, and kidney stones who presents to the ED for evaluation of generalized fatigue, weakness, and suprapubic abdominal pain for 2 days. She states her abdominal pain is presently and 9/10 and was unrelieved with home roxycontin.  She reports nausea. Denies vomiting or diarrhea.  She reports fair appetite.   It is noted patient has chronic abdominal pain however she states her pain is worse than normal and she has had persistent nausea unrelieved with home antiemetics.       CT of the abdomen with contrast demonstrated  Mild emphysematous changes of the lung parenchyma.   Dilatation of the biliary channels throughout the liver parenchyma which was seen previously on the examination obtained in November 2021. Bilateral hydronephrosis and hydroureter however there is no obvious obstructing kidney stone seen within the ureter on either side.  The level of hydronephrosis and hydroureter has increased as compared to the previous examination. Large calcifications involving the inferior collecting system of each kidney.  Patient is noted to have recurrent urinary tract infections secondary to Klebsiella which is multi-drug resistant.  She was initiated on meropenem in the ED.

## 2022-02-02 NOTE — ASSESSMENT & PLAN NOTE
Patient with positive nitrates although WBCs 13. Urine culture pending.  Initiated on meropenem based on previous culture which demonstrated multi drug-resistant Klebsiella.  Consult Urology for recurrent UTI and hydronephrosis

## 2022-02-02 NOTE — PROVIDER PROGRESS NOTES - EMERGENCY DEPT.
Encounter Date: 2/2/2022    ED Physician Progress Notes        Physician Note:   Sign-out received from Dr. Villalta. Pt's labs are reassuring, but UA shows evidence of a UTI, and CT shows hydronephrosis and hydroureter worse than before. Last UCx sensitive to only meropenem. Will need admission.

## 2022-02-03 LAB — POCT GLUCOSE: 109 MG/DL (ref 70–110)

## 2022-02-03 PROCEDURE — G0378 HOSPITAL OBSERVATION PER HR: HCPCS | Mod: HCNC

## 2022-02-03 PROCEDURE — 25000003 PHARM REV CODE 250: Mod: HCNC | Performed by: HOSPITALIST

## 2022-02-03 PROCEDURE — 94761 N-INVAS EAR/PLS OXIMETRY MLT: CPT | Mod: HCNC

## 2022-02-03 PROCEDURE — 97535 SELF CARE MNGMENT TRAINING: CPT | Mod: HCNC

## 2022-02-03 PROCEDURE — 96372 THER/PROPH/DIAG INJ SC/IM: CPT | Mod: HCNC | Performed by: NURSE PRACTITIONER

## 2022-02-03 PROCEDURE — 25000003 PHARM REV CODE 250: Mod: HCNC | Performed by: NURSE PRACTITIONER

## 2022-02-03 PROCEDURE — 97165 OT EVAL LOW COMPLEX 30 MIN: CPT | Mod: HCNC

## 2022-02-03 PROCEDURE — 63600175 PHARM REV CODE 636 W HCPCS: Mod: HCNC | Performed by: NURSE PRACTITIONER

## 2022-02-03 RX ORDER — LOPERAMIDE HYDROCHLORIDE 2 MG/1
2 CAPSULE ORAL 4 TIMES DAILY PRN
Status: DISCONTINUED | OUTPATIENT
Start: 2022-02-03 | End: 2022-02-09 | Stop reason: HOSPADM

## 2022-02-03 RX ADMIN — OXYCODONE 15 MG: 5 TABLET ORAL at 10:02

## 2022-02-03 RX ADMIN — MEROPENEM 500 MG: 500 INJECTION, POWDER, FOR SOLUTION INTRAVENOUS at 02:02

## 2022-02-03 RX ADMIN — OXYCODONE 15 MG: 5 TABLET ORAL at 08:02

## 2022-02-03 RX ADMIN — METOPROLOL TARTRATE 25 MG: 25 TABLET, FILM COATED ORAL at 09:02

## 2022-02-03 RX ADMIN — ATORVASTATIN CALCIUM 40 MG: 40 TABLET, FILM COATED ORAL at 08:02

## 2022-02-03 RX ADMIN — Medication 400 MG: at 08:02

## 2022-02-03 RX ADMIN — OXYCODONE 15 MG: 5 TABLET ORAL at 02:02

## 2022-02-03 RX ADMIN — LOPERAMIDE HYDROCHLORIDE 2 MG: 2 CAPSULE ORAL at 12:02

## 2022-02-03 RX ADMIN — MEROPENEM 500 MG: 500 INJECTION, POWDER, FOR SOLUTION INTRAVENOUS at 05:02

## 2022-02-03 RX ADMIN — METOPROLOL TARTRATE 25 MG: 25 TABLET, FILM COATED ORAL at 08:02

## 2022-02-03 RX ADMIN — OXYCODONE 15 MG: 5 TABLET ORAL at 06:02

## 2022-02-03 RX ADMIN — ENOXAPARIN SODIUM 40 MG: 100 INJECTION SUBCUTANEOUS at 05:02

## 2022-02-03 RX ADMIN — Medication 400 MG: at 09:02

## 2022-02-03 RX ADMIN — LOPERAMIDE HYDROCHLORIDE 2 MG: 2 CAPSULE ORAL at 06:02

## 2022-02-03 RX ADMIN — LOSARTAN POTASSIUM 50 MG: 50 TABLET, FILM COATED ORAL at 09:02

## 2022-02-03 RX ADMIN — OXYCODONE 15 MG: 5 TABLET ORAL at 03:02

## 2022-02-03 RX ADMIN — MEROPENEM 500 MG: 500 INJECTION, POWDER, FOR SOLUTION INTRAVENOUS at 12:02

## 2022-02-03 NOTE — PLAN OF CARE
Problem: Adult Inpatient Plan of Care  Goal: Plan of Care Review  Outcome: Ongoing, Progressing  Goal: Patient-Specific Goal (Individualized)  Outcome: Ongoing, Progressing  Goal: Absence of Hospital-Acquired Illness or Injury  Outcome: Ongoing, Progressing  Goal: Optimal Comfort and Wellbeing  Outcome: Ongoing, Progressing     Problem: Infection  Goal: Absence of Infection Signs and Symptoms  Outcome: Ongoing, Progressing     Problem: Skin Injury Risk Increased  Goal: Skin Health and Integrity  Outcome: Ongoing, Progressing     Problem: Fall Injury Risk  Goal: Absence of Fall and Fall-Related Injury  Outcome: Ongoing, Progressing     POC reviewed with patient. All questions and concerns reviewed. Fall/safety precautions implemented and maintained. Purewick care performed. IV abx given. Pain management provided. No acute events noted this shift. Please see flowsheet for full assessment and vitals. Bed locked in lowest position. Call bell within reach. Will continue to monitor.

## 2022-02-03 NOTE — PT/OT/SLP PROGRESS
Physical Therapy      Patient Name:  Patito Domingo   MRN:  [id number]    Patient not seen today secondary to at first attempt 1110 am pt w other discipline. At second attempt at ll54 pt refused therapy at this time stating she'd been having diarrhea all day and wanted to have her lunch which she was setting up.  Shortly after nsg began working w pt.  Pt reported she will participate later. Will follow-up as able.

## 2022-02-03 NOTE — PROGRESS NOTES
[facility]  Urology  Progress Note    Patient Name: Patito Domingo  MRN: [id number]  Admission Date: 2/2/2022  Hospital Length of Stay: 0 days    Subjective:     Interval History: ELIJAH.  Pain to lower abdomen mildly improved, states cramping after voids and frequency of urination.  Refuses holbrook, no bladder scan or post void residuals done.  Voiding to purewick.      ROS: no dysuria, hematuria.  + frequency, urgency, abd pain, flank pain     Objective:     Temp:  [96.6 °F (35.9 °C)-98.4 °F (36.9 °C)] 96.6 °F (35.9 °C)  Pulse:  [] 65  Resp:  [15-20] 17  SpO2:  [95 %-100 %] 100 %  BP: (122-185)/(63-83) 148/68         NAD  RRR  CTAB  ABD S/mild ttp to lower abd       Holbrook no holbrook,l purewick in place with clear urine       Ext: no edema      Significant Labs:  BMP:  Recent Labs   Lab 02/02/22  0602      K 3.8      CO2 21*   BUN 15   CREATININE 0.9   CALCIUM 10.0       CBC:  Recent Labs   Lab 02/02/22  0602   WBC 5.75   HGB 9.9*   HCT 31.2*          Assessment/Plan:     Other hydronephrosis  70 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 UTI on UA.  Cr at baseline.  Pain to lower abdomen.      Plan  1.  No PVRs checked, d/w RN to do post void or q6 and IC for volume >300cc  2.  Again recommended holbrook, does not want discussed would need to place holbrook to see if hydro resolves prior to placing stents.   3.  Stents may help with hydro but does not tolerate and high chance of reflux  4.  Continue current care, CHARY tomorrow AM will consider stents if pain does not continue to improve  5.  Outpt follow up with her urologist   6.  Follow up urine culture     Staghorn calculus  Stable chronic     Urinary tract infection without hematuria  UCx ordered    Ervin Parikh MD  Urology

## 2022-02-03 NOTE — PLAN OF CARE
SW met with pt at bedside to discuss dc planning.    Pt confirmed information on chart. Pt reports that she resides home alone. Pt is independent in ADLs. Pt has Rollator, RW, wheel chair, BSC, and Transfer tub bench. Pt reported HH services but she can not remember the agency. Pt reported upon dc her son will pick her up. SW provided brochure and wrote name on board for any future questions or concerns. SW will continue to follow pt throughout her transitions of care and assist with any dc needs.     [place] [phone]    Future Appointments   Date Time Provider Department Center   2/10/2022 10:20 AM Ervin Parikh MD [facility] UROLOGY [facility]   2/15/2022  2:00 PM Yasir Myers Jr., MD [facility]        02/03/22 1621   Discharge Assessment   Assessment Type Discharge Planning Assessment   Confirmed/corrected address, phone number and insurance Yes   Confirmed Demographics Correct on Facesheet   Source of Information patient   Communicated JULIO C with patient/caregiver Yes   Lives With alone   Do you expect to return to your current living situation? Yes   Do you have help at home or someone to help you manage your care at home? No   Prior to hospitilization cognitive status: Alert/Oriented   Current cognitive status: Alert/Oriented   Walking or Climbing Stairs Difficulty ambulation difficulty, requires equipment   Dressing/Bathing Difficulty bathing difficulty, requires equipment   Equipment Currently Used at Home walker, rolling;wheelchair;bedside commode;rollator;shower chair   Patient currently being followed by outpatient case management? No   Do you currently have service(s) that help you manage your care at home? No   Do you take prescription medications? Yes   Do you have prescription coverage? Yes   Coverage [employer]   Do you have any problems affording any of your prescribed medications? No   Is the patient taking medications as prescribed? yes   Who is going to help  you get home at discharge? Zandra, Son [phone]   How do you get to doctors appointments? family or friend will provide;health plan transportation   Are you on dialysis? No   Do you take coumadin? No   Discharge Plan A Home Health   Discharge Plan B Home   DME Needed Upon Discharge    (TBD)   Discharge Plan discussed with: Patient   Discharge Barriers Identified None

## 2022-02-03 NOTE — PLAN OF CARE
Problem: Occupational Therapy Goal  Goal: Occupational Therapy Goal  Description: Goals to be met by: 2/17/2022    Patient will increase functional independence with ADLs by performing:    UE Dressing with Modified [place].  LE Dressing with Modified [place].  Grooming while standing at sink with Modified [place].  Toileting from toilet with Modified [place] for hygiene and clothing management.   Step transfer with Modified [place]  Toilet transfer to toilet with Modified [place].  Upper extremity exercise program x15 reps per handout, with independence.    Outcome: Ongoing, Progressing   OT initial eval completed and tx initiated. Pt. completed toileting SBA,  Toilet t/f SBA using RW, grooming SBA standing inside RW at sink.  Pt. ambulated CGA using RW to bathroom and sink with vc for upright posture, pt. c/o 8/10 abdominal pain and says its why she walks bent forward. Pt. Has all necessary DME at home. OT recommending HHOT at discharge.  Continue with OT POC.

## 2022-02-03 NOTE — PT/OT/SLP EVAL
Occupational Therapy   Evaluation and tx    Name: Patito Domingo  MRN: [id number]  Admitting Diagnosis:  <principal problem not specified>  Recent Surgery: * No surgery found *    The primary encounter diagnosis was Urinary tract infection with hematuria, site unspecified. Diagnoses of Weakness, ESBL (extended spectrum beta-lactamase) producing bacteria infection, Hydronephrosis, unspecified hydronephrosis type, and Chest pain were also pertinent to this visit.    Recommendations:     Discharge Recommendations: home health OT  Discharge Equipment Recommendations:  bedside commode,rollator,walker, rolling,shower chair,grab bar  Barriers to discharge:  None    Assessment:     Patito Domingo is a 71 y.o. female with a medical diagnosis of <principal problem not specified>.  She presents with Performance deficits affecting function: weakness,impaired endurance,impaired self care skills,impaired functional mobilty,gait instability,impaired balance,decreased lower extremity function,decreased safety awareness,pain,decreased coordination.      OT initial eval completed and tx initiated. Pt. completed toileting SBA,  Toilet t/f SBA using RW, grooming SBA standing inside RW at sink.  Pt. ambulated CGA using RW to bathroom and sink with vc for upright posture, pt. c/o 8/10 abdominal pain and says its why she walks bent forward. Pt. has all necessary DME at home. OT recommending HHOT at discharge.  Continue with OT POC.       Rehab Prognosis: Good; patient would benefit from acute skilled OT services to address these deficits and reach maximum level of function.       Plan:     Patient to be seen 3 x/week to address the above listed problems via self-care/home management,therapeutic activities,therapeutic exercises  · Plan of Care Expires: 03/03/22  · Plan of Care Reviewed with: patient    Subjective     Chief Complaint: abdominal pain and perirectal pain from frequent diarrhea  Patient/Family Comments/goals:  none    Occupational Profile:  Living Environment: Pt. lives in second floor apt with flight of stairs with BHR; t/s combo with GB and shower chair.  Previous level of function: Indep-Mod I ADS-pt dresses, bathes self; does light meal prep or children bring over meals; does her laundry at children's house; relies on medical transportation to Dr. Dickey. Otherwise family takes her on errands; family does most grocery shopping for her; she has a house keeper 1 x wk.  Roles and Routines: active in above; homemaker  Equipment Used at Home:  bedside commode,grab bar,rollator,shower chair,walker, rolling  Assistance upon Discharge: children    Pain/Comfort:  Pain Rating 1: 8/10  Location - Side 1: Bilateral  Location - Orientation 1: generalized  Location 1: abdomen  Pain Addressed 1: Reposition,Distraction,Cessation of Activity,Nurse notified  Pain Rating Post-Intervention 1: 8/10    Patients cultural, spiritual, Judaism conflicts given the current situation: no    Objective:     Communicated with: nurse prior to session.  Patient found HOB elevated with telemetry,bed alarm,peripheral IV upon OT entry to room.    General Precautions: Standard, fall,seizure   Orthopedic Precautions:N/A   Braces: N/A  Respiratory Status: Room air    Occupational Performance:    Bed Mobility:    · Patient completed Rolling/Turning to Left with  stand by assistance, with side rail and vc for segmental rolling  · Patient completed Scooting/Bridging with stand by assistance  · Patient completed Supine to Sit with stand by assistance, with side rail and HOB elevated    Functional Mobility/Transfers:  · Patient completed Sit <> Stand Transfer with stand by assistance  with  rolling walker   · Patient completed Bed <> Chair Transfer using Step Transfer technique with stand by assistance with rolling walker  · Patient completed Toilet Transfer Step Transfer technique with stand by assistance with  rolling walker and grab bar  · Functional  Mobility: ambulated CGA using RW to bathroom, sink and bs chair next to bed, trunk flexed forward, pt reports abdominal pain causing her to walk like this; pain 8/10.     Activities of Daily Living:  · Feeding:  independence .  · Grooming: stand by assistance washed hands stabding inside RW at sink  · Toileting: stand by assistance extra time on toielt due to diarrhea    Cognitive/Visual Perceptual:  Cognitive/Psychosocial Skills:     -       Oriented to: Person, Place, Time and Situation   -       Follows Commands/attention:Follows one-step commands and Follows two-step commands  -       Communication: clear/fluent  -       Memory: No Deficits noted  -       Safety awareness/insight to disability: impaired   -       Mood/Affect/Coping skills/emotional control: Cooperative and Anxious  Visual/Perceptual:      -Intact .    Physical Exam:  Balance:    -       sitting:  good  dynamic:  good-  standing: good -  dynamic:  fair  Postural examination/scapula alignment:    -       Rounded shoulders  -       Forward head  Dominant hand:    -       left  Upper Extremity Range of Motion:     -       Right Upper Extremity: WFL  -       Left Upper Extremity: WFL  Upper Extremity Strength:    -       Right Upper Extremity: WFL  -       Left Upper Extremity: WFL   Strength:    -       Right Upper Extremity: WFL  -       Left Upper Extremity: WFL    AMPAC 6 Click ADL:  AMPAC Total Score: 21    Treatment & Education:  Role of oT and POC  Safety with RW use, postural awareness with RW use  Call don't fall, call bell use explained, nurse notified pt sitting UIC.  Education:    Patient left up in chair with all lines intact, call button in reach and nurse notified    GOALS:   Multidisciplinary Problems     Occupational Therapy Goals        Problem: Occupational Therapy Goal    Goal Priority Disciplines Outcome Interventions   Occupational Therapy Goal     OT, PT/OT Ongoing, Progressing    Description: Goals to be met by:  2022    Patient will increase functional independence with ADLs by performing:    UE Dressing with Modified [place].  LE Dressing with Modified [place].  Grooming while standing at sink with Modified [place].  Toileting from toilet with Modified [place] for hygiene and clothing management.   Step transfer with Modified [place]  Toilet transfer to toilet with Modified [place].  Upper extremity exercise program x15 reps per handout, with independence.                     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)          Past Surgical History:   Procedure Laterality Date    ABDOMINAL SURGERY      CATARACT EXTRACTION Left 10/2017     SECTION      CHOLECYSTECTOMY      COLON SURGERY      cystoscope      CYSTOSCOPY W/ RETROGRADES Right 10/10/2019    Procedure: CYSTOSCOPY, WITH RETROGRADE PYELOGRAM;  Surgeon: Gen Isbell MD;  Location: [facility];  Service: Urology;  Laterality: Right;    ERCP N/A 2021    Procedure: ERCP (ENDOSCOPIC RETROGRADE CHOLANGIOPANCREATOGRAPHY);  Surgeon: Jayjay Rivera MD;  Location: [facility] ENDO ([address] [place] [address]);  Service: Endoscopy;  Laterality: N/A;    EYE SURGERY      HYSTERECTOMY  1996    LITHOTRIPSY      LIVER BIOPSY      carcinoid    URETEROSCOPY Right 10/10/2019    Procedure: URETEROSCOPY;  Surgeon: Gen Isbell MD;  Location: [facility];  Service: Urology;  Laterality: Right;    UTERINE FIBROID SURGERY         Time Tracking:     OT Date of Treatment: 22  OT Start Time: 1306  OT Stop Time: 1332  OT Total Time (min): 26 min    Billable Minutes:Evaluation 10  Self Care/Home Management 16  Total Time  26    2/3/2022

## 2022-02-03 NOTE — PROGRESS NOTES
[facility] Medicine  Progress Note    Patient Name: Patito Domingo  MRN: [id number]  Patient Class: OP- Observation   Admission Date: 2/2/2022  Length of Stay: 0 days  Attending Physician: Eleno Osborne, *  Primary Care Provider: Yasir Myers Jr, MD        Subjective:     Principal Problem:Urinary tract infection without hematuria        HPI:  Patito Domingo is a 71 y.o. female with a PMH of colon cancer status post chemotherapy in July 2021, arthritis, hypertension, and kidney stones who presents to the ED for evaluation of generalized fatigue, weakness, and suprapubic abdominal pain for 2 days. She states her abdominal pain is presently and 9/10 and was unrelieved with home roxycontin.  She reports nausea. Denies vomiting or diarrhea.  She reports fair appetite.   It is noted patient has chronic abdominal pain however she states her pain is worse than normal and she has had persistent nausea unrelieved with home antiemetics.       CT of the abdomen with contrast demonstrated  Mild emphysematous changes of the lung parenchyma.   Dilatation of the biliary channels throughout the liver parenchyma which was seen previously on the examination obtained in November 2021. Bilateral hydronephrosis and hydroureter however there is no obvious obstructing kidney stone seen within the ureter on either side.  The level of hydronephrosis and hydroureter has increased as compared to the previous examination. Large calcifications involving the inferior collecting system of each kidney.  Patient is noted to have recurrent urinary tract infections secondary to Klebsiella which is multi-drug resistant.  She was initiated on meropenem in the ED.        Overview/Hospital Course:  No notes on file    Interval History:  Reports that she still has some abdominal pain and discomfort.  She states that she does not have issues with urinating at this time.  Postvoid residual this morning only 38.  Unfortunately, a urine culture was not sent in the ED at time of admission.    Review of Systems   Constitutional: Negative for fever.   Respiratory: Negative for shortness of breath.    Gastrointestinal: Positive for abdominal distention, abdominal pain and nausea.   Neurological: Negative for weakness.     Objective:     Vital Signs (Most Recent):  Temp: 97.3 °F (36.3 °C) (02/03/22 1703)  Pulse: 67 (02/03/22 1703)  Resp: 17 (02/03/22 1703)  BP: (!) 124/58 (02/03/22 1703)  SpO2: 100 % (02/03/22 1703) Vital Signs (24h Range):  Temp:  [96.6 °F (35.9 °C)-98.4 °F (36.9 °C)] 97.3 °F (36.3 °C)  Pulse:  [62-87] 67  Resp:  [15-18] 17  SpO2:  [95 %-100 %] 100 %  BP: (117-178)/(58-80) 124/58     Weight: 78.5 kg (173 lb 1 oz)  Body mass index is 27.93 kg/m².    Intake/Output Summary (Last 24 hours) at 2/3/2022 1746  Last data filed at 2/3/2022 0956  Gross per 24 hour   Intake 240 ml   Output 200 ml   Net 40 ml      Physical Exam  Vitals and nursing note reviewed.   Constitutional:       Appearance: Normal appearance. She is well-developed. She is not ill-appearing.   HENT:      Head: Normocephalic and atraumatic.      Right Ear: External ear normal.      Left Ear: External ear normal.      Nose: Nose normal.      Mouth/Throat:      Mouth: Mucous membranes are moist.      Comments: Poor dentition  Eyes:      Conjunctiva/sclera: Conjunctivae normal.      Pupils: Pupils are equal, round, and reactive to light.   Cardiovascular:      Rate and Rhythm: Normal rate and regular rhythm.      Heart sounds: Normal heart sounds.   Pulmonary:      Effort: Pulmonary effort is normal.      Breath sounds: Normal breath sounds.   Abdominal:      General: Bowel sounds are normal. There is no distension.      Palpations: Abdomen is soft. There is no mass.      Tenderness: There is abdominal tenderness. There is guarding (Suprapubic tenderness).   Musculoskeletal:         General: Normal range of motion.      Cervical back: Normal range of  motion and neck supple.   Skin:     General: Skin is warm and dry.   Neurological:      General: No focal deficit present.      Mental Status: She is alert and oriented to person, place, and time. Mental status is at baseline.      Comments: Generalized weakness   Psychiatric:         Behavior: Behavior normal.         Judgment: Judgment normal.         Significant Labs: All pertinent labs within the past 24 hours have been reviewed.    Significant Imaging: I have reviewed all pertinent imaging results/findings within the past 24 hours.      Assessment/Plan:      * Urinary tract infection without hematuria   Patient with positive nitrates although WBCs 13. Urine culture pending.  Initiated on meropenem based on previous culture which demonstrated multi drug-resistant Klebsiella.  Consult Urology for recurrent UTI and hydronephrosis      Other hydronephrosis  -bilateral hydronephrosis  -urology following; may require stenting      Staghorn calculus  -noted  -urology following  -IV antibiotics      Essential hypertension  Chronic, controlled.  Latest blood pressure and vitals reviewed-   Temp:  [96.6 °F (35.9 °C)-98.4 °F (36.9 °C)]   Pulse:  [62-87]   Resp:  [15-18]   BP: (117-178)/(58-80)   SpO2:  [95 %-100 %] .   Home meds for hypertension were reviewed and noted below.   Hypertension Medications             losartan (COZAAR) 50 MG tablet Take 1 tablet (50 mg total) by mouth once daily.    metoprolol tartrate (LOPRESSOR) 25 MG tablet Take 1 tablet (25 mg total) by mouth 2 (two) times daily.          While in the hospital, will manage blood pressure as follows; Continue home antihypertensive regimen    Will utilize p.r.n. blood pressure medication only if patient's blood pressure greater than  180/110 and she develops symptoms such as worsening chest pain or shortness of breath.        Chronic pain syndrome    Resume home opioids.    Iron deficiency anemia secondary to inadequate dietary iron intake  Patient's anemia  is currently controlled. Has not received any PRBCs to date.. Etiology likely d/t iron def  Current CBC reviewed-   Lab Results   Component Value Date    HGB 9.9 (L) 02/02/2022    HCT 31.2 (L) 02/02/2022     Monitor serial CBC and transfuse if patient becomes hemodynamically unstable, symptomatic or H/H drops below 7/21.          Debility  Consult PT and OT    Secondary neuroendocrine tumor of liver  Chronic followed by oncology.  Status post chemotherapy in July of 2021.     VTE Risk Mitigation (From admission, onward)         Ordered     enoxaparin injection 40 mg  Daily         02/02/22 1336     IP VTE HIGH RISK PATIENT  Once         02/02/22 1336     Place sequential compression device  Until discontinued         02/02/22 1336                Discharge Planning   JULIO C:      Code Status: Full Code   Is the patient medically ready for discharge?:     Reason for patient still in hospital (select all that apply): Consult recommendations  Discharge Plan A: Home Health                  Eleno Osborne MD  Department of Hospital Medicine   [facility]

## 2022-02-03 NOTE — NURSING
2345: Patient arrived to unit via stretcher per transportation in stable condition. AAO x4. Respirations even and unlabored. No s/s of distress noted. Purewick in place and in progress. Patient c/o R flank pain. Clear liquid diet ordered at this time. Oriented patient to room and call bell. Bed locked in lowest position. Call bell within reach. Will continue to monitor.

## 2022-02-03 NOTE — SUBJECTIVE & OBJECTIVE
Interval History:  Reports that she still has some abdominal pain and discomfort.  She states that she does not have issues with urinating at this time.  Postvoid residual this morning only 38. Unfortunately, a urine culture was not sent in the ED at time of admission.    Review of Systems   Constitutional: Negative for fever.   Respiratory: Negative for shortness of breath.    Gastrointestinal: Positive for abdominal distention, abdominal pain and nausea.   Neurological: Negative for weakness.     Objective:     Vital Signs (Most Recent):  Temp: 97.3 °F (36.3 °C) (02/03/22 1703)  Pulse: 67 (02/03/22 1703)  Resp: 17 (02/03/22 1703)  BP: (!) 124/58 (02/03/22 1703)  SpO2: 100 % (02/03/22 1703) Vital Signs (24h Range):  Temp:  [96.6 °F (35.9 °C)-98.4 °F (36.9 °C)] 97.3 °F (36.3 °C)  Pulse:  [62-87] 67  Resp:  [15-18] 17  SpO2:  [95 %-100 %] 100 %  BP: (117-178)/(58-80) 124/58     Weight: 78.5 kg (173 lb 1 oz)  Body mass index is 27.93 kg/m².    Intake/Output Summary (Last 24 hours) at 2/3/2022 1746  Last data filed at 2/3/2022 0956  Gross per 24 hour   Intake 240 ml   Output 200 ml   Net 40 ml      Physical Exam  Vitals and nursing note reviewed.   Constitutional:       Appearance: Normal appearance. She is well-developed. She is not ill-appearing.   HENT:      Head: Normocephalic and atraumatic.      Right Ear: External ear normal.      Left Ear: External ear normal.      Nose: Nose normal.      Mouth/Throat:      Mouth: Mucous membranes are moist.      Comments: Poor dentition  Eyes:      Conjunctiva/sclera: Conjunctivae normal.      Pupils: Pupils are equal, round, and reactive to light.   Cardiovascular:      Rate and Rhythm: Normal rate and regular rhythm.      Heart sounds: Normal heart sounds.   Pulmonary:      Effort: Pulmonary effort is normal.      Breath sounds: Normal breath sounds.   Abdominal:      General: Bowel sounds are normal. There is no distension.      Palpations: Abdomen is soft. There is no  mass.      Tenderness: There is abdominal tenderness. There is guarding (Suprapubic tenderness).   Musculoskeletal:         General: Normal range of motion.      Cervical back: Normal range of motion and neck supple.   Skin:     General: Skin is warm and dry.   Neurological:      General: No focal deficit present.      Mental Status: She is alert and oriented to person, place, and time. Mental status is at baseline.      Comments: Generalized weakness   Psychiatric:         Behavior: Behavior normal.         Judgment: Judgment normal.         Significant Labs: All pertinent labs within the past 24 hours have been reviewed.    Significant Imaging: I have reviewed all pertinent imaging results/findings within the past 24 hours.

## 2022-02-04 PROCEDURE — 63600175 PHARM REV CODE 636 W HCPCS: Mod: HCNC | Performed by: NURSE PRACTITIONER

## 2022-02-04 PROCEDURE — 97116 GAIT TRAINING THERAPY: CPT | Mod: HCNC

## 2022-02-04 PROCEDURE — 63600175 PHARM REV CODE 636 W HCPCS: Mod: HCNC | Performed by: HOSPITALIST

## 2022-02-04 PROCEDURE — 25000003 PHARM REV CODE 250: Mod: HCNC | Performed by: HOSPITALIST

## 2022-02-04 PROCEDURE — 11000001 HC ACUTE MED/SURG PRIVATE ROOM: Mod: HCNC

## 2022-02-04 PROCEDURE — 97162 PT EVAL MOD COMPLEX 30 MIN: CPT | Mod: HCNC

## 2022-02-04 PROCEDURE — 25000003 PHARM REV CODE 250: Mod: HCNC | Performed by: NURSE PRACTITIONER

## 2022-02-04 PROCEDURE — 94761 N-INVAS EAR/PLS OXIMETRY MLT: CPT | Mod: HCNC

## 2022-02-04 PROCEDURE — 97535 SELF CARE MNGMENT TRAINING: CPT | Mod: HCNC,CO

## 2022-02-04 PROCEDURE — 96372 THER/PROPH/DIAG INJ SC/IM: CPT | Mod: HCNC | Performed by: NURSE PRACTITIONER

## 2022-02-04 RX ORDER — MEROPENEM AND SODIUM CHLORIDE 1 G/50ML
1 INJECTION, SOLUTION INTRAVENOUS
Status: DISCONTINUED | OUTPATIENT
Start: 2022-02-04 | End: 2022-02-04

## 2022-02-04 RX ADMIN — OXYCODONE 15 MG: 5 TABLET ORAL at 08:02

## 2022-02-04 RX ADMIN — ATORVASTATIN CALCIUM 40 MG: 40 TABLET, FILM COATED ORAL at 09:02

## 2022-02-04 RX ADMIN — LOPERAMIDE HYDROCHLORIDE 2 MG: 2 CAPSULE ORAL at 09:02

## 2022-02-04 RX ADMIN — LOSARTAN POTASSIUM 50 MG: 50 TABLET, FILM COATED ORAL at 09:02

## 2022-02-04 RX ADMIN — Medication 400 MG: at 09:02

## 2022-02-04 RX ADMIN — OXYCODONE 15 MG: 5 TABLET ORAL at 02:02

## 2022-02-04 RX ADMIN — MEROPENEM 1 G: 1 INJECTION, POWDER, FOR SOLUTION INTRAVENOUS at 05:02

## 2022-02-04 RX ADMIN — MEROPENEM 500 MG: 500 INJECTION, POWDER, FOR SOLUTION INTRAVENOUS at 09:02

## 2022-02-04 RX ADMIN — OXYCODONE 15 MG: 5 TABLET ORAL at 09:02

## 2022-02-04 RX ADMIN — ENOXAPARIN SODIUM 40 MG: 100 INJECTION SUBCUTANEOUS at 05:02

## 2022-02-04 RX ADMIN — MEROPENEM 500 MG: 500 INJECTION, POWDER, FOR SOLUTION INTRAVENOUS at 02:02

## 2022-02-04 RX ADMIN — METOPROLOL TARTRATE 25 MG: 25 TABLET, FILM COATED ORAL at 09:02

## 2022-02-04 RX ADMIN — ZINC OXIDE TOPICAL OINT.: at 09:02

## 2022-02-04 NOTE — PROGRESS NOTES
[facility] - Telemetry  Urology  Progress Note    Patient Name: Patito Domingo  MRN: [id number]  Admission Date: 2/2/2022  Hospital Length of Stay: 2 days    Subjective:     Interval History:  ELIJAH, pain improved, less pain to flank, voiding ok with PVR 31 and 61      Objective:     Temp:  [96.6 °F (35.9 °C)-98.4 °F (36.9 °C)] 97.7 °F (36.5 °C)  Pulse:  [52-72] 66  Resp:  [14-18] 18  SpO2:  [99 %-100 %] 99 %  BP: (117-157)/(58-71) 157/65     Body mass index is 27.93 kg/m².      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 UTI on UA.  Cr at baseline.  Pain to lower abdomen.   --improved pain, PVRs low     Plan  1.  Urine culture pending, continue antibiotic  2.  Follow up CHARY ordered this AM, if no worsening hydro will give diet  3.  No stents needed at current pending above  4.  Ok for d/c from  standpoint after culture back if pain improved     Staghorn calculus  Stable chronic     Urinary tract infection without hematuria  UCx ordered    Ervin Parikh MD  Urology

## 2022-02-04 NOTE — PLAN OF CARE
SW sent Home IV abx referral via careport to [facility].     SW will continue to follow pt throughout her transitions of care and assist with any dc needs.     Future Appointments   Date Time Provider Department Center   2/10/2022 10:20 AM Ervin Parikh MD [facility] UROLOGY [facility]   2/15/2022  2:00 PM Yasir Myers Jr., MD [facility]            02/04/22 1140   Post-Acute Status   Post-Acute Authorization IV Infusion   IV Infusion Status Referral(s) sent   Discharge Delays None known at this time   Discharge Plan   Discharge Plan A Home Health   Discharge Plan B Home with family

## 2022-02-04 NOTE — PROGRESS NOTES
[facility] Medicine  Progress Note    Patient Name: Patito Domingo  MRN: [id number]  Patient Class: OP- Observation   Admission Date: 2/2/2022  Length of Stay: 0 days  Attending Physician: Eleno Osborne, *  Primary Care Provider: Yasir Myers Jr, MD        Subjective:     Principal Problem:Urinary tract infection without hematuria        HPI:  Patito Domingo is a 71 y.o. female with a PMH of colon cancer status post chemotherapy in July 2021, arthritis, hypertension, and kidney stones who presents to the ED for evaluation of generalized fatigue, weakness, and suprapubic abdominal pain for 2 days. She states her abdominal pain is presently and 9/10 and was unrelieved with home roxycontin.  She reports nausea. Denies vomiting or diarrhea.  She reports fair appetite.   It is noted patient has chronic abdominal pain however she states her pain is worse than normal and she has had persistent nausea unrelieved with home antiemetics.       CT of the abdomen with contrast demonstrated  Mild emphysematous changes of the lung parenchyma.   Dilatation of the biliary channels throughout the liver parenchyma which was seen previously on the examination obtained in November 2021. Bilateral hydronephrosis and hydroureter however there is no obvious obstructing kidney stone seen within the ureter on either side.  The level of hydronephrosis and hydroureter has increased as compared to the previous examination. Large calcifications involving the inferior collecting system of each kidney.  Patient is noted to have recurrent urinary tract infections secondary to Klebsiella which is multi-drug resistant.  She was initiated on meropenem in the ED.        Overview/Hospital Course:  No notes on file    Interval History:  Pain improving and without urinary retention.  Continuing IV antibiotics and awaiting culture results.  Patient does not think that she can manage IV antibiotics with home  health.    Review of Systems   Constitutional: Negative for fever.   Respiratory: Negative for shortness of breath.    Gastrointestinal: Positive for abdominal distention, abdominal pain and nausea.   Neurological: Negative for weakness.     Objective:     Vital Signs (Most Recent):  Temp: 96.4 °F (35.8 °C) (02/04/22 1210)  Pulse: 67 (02/04/22 1210)  Resp: 18 (02/04/22 1419)  BP: (!) 147/65 (02/04/22 1210)  SpO2: 100 % (02/04/22 1223) Vital Signs (24h Range):  Temp:  [96.4 °F (35.8 °C)-98.4 °F (36.9 °C)] 96.4 °F (35.8 °C)  Pulse:  [52-71] 67  Resp:  [14-18] 18  SpO2:  [99 %-100 %] 100 %  BP: (124-157)/(58-71) 147/65     Weight: 78.5 kg (173 lb 1 oz)  Body mass index is 27.93 kg/m².    Intake/Output Summary (Last 24 hours) at 2/4/2022 1454  Last data filed at 2/4/2022 0800  Gross per 24 hour   Intake 100 ml   Output 825 ml   Net -725 ml      Physical Exam  Vitals and nursing note reviewed.   Constitutional:       Appearance: Normal appearance. She is well-developed. She is not ill-appearing.   HENT:      Head: Normocephalic and atraumatic.      Right Ear: External ear normal.      Left Ear: External ear normal.      Nose: Nose normal.      Mouth/Throat:      Mouth: Mucous membranes are moist.      Comments: Poor dentition  Eyes:      Conjunctiva/sclera: Conjunctivae normal.      Pupils: Pupils are equal, round, and reactive to light.   Cardiovascular:      Rate and Rhythm: Normal rate and regular rhythm.      Heart sounds: Normal heart sounds.   Pulmonary:      Effort: Pulmonary effort is normal.      Breath sounds: Normal breath sounds.   Abdominal:      General: Bowel sounds are normal. There is no distension.      Palpations: Abdomen is soft. There is no mass.      Tenderness: There is abdominal tenderness. There is guarding (Suprapubic tenderness).   Musculoskeletal:         General: Normal range of motion.      Cervical back: Normal range of motion and neck supple.   Skin:     General: Skin is warm and dry.    Neurological:      General: No focal deficit present.      Mental Status: She is alert and oriented to person, place, and time. Mental status is at baseline.      Comments: Generalized weakness   Psychiatric:         Behavior: Behavior normal.         Judgment: Judgment normal.         Significant Labs: All pertinent labs within the past 24 hours have been reviewed.    Significant Imaging: I have reviewed all pertinent imaging results/findings within the past 24 hours.      Assessment/Plan:      * Urinary tract infection without hematuria   Patient with positive nitrates although WBCs 13. Urine culture pending.  Initiated on meropenem based on previous culture which demonstrated multi drug-resistant Klebsiella.  Consult Urology for recurrent UTI and hydronephrosis      Other hydronephrosis  -bilateral hydronephrosis  -urology following; currently no plans for stent      Staghorn calculus  -noted  -urology following  -IV antibiotics      Essential hypertension  Chronic, controlled.  Latest blood pressure and vitals reviewed-   Temp:  [96.4 °F (35.8 °C)-98.4 °F (36.9 °C)]   Pulse:  [52-71]   Resp:  [14-18]   BP: (124-157)/(58-71)   SpO2:  [99 %-100 %] .   Home meds for hypertension were reviewed and noted below.   Hypertension Medications             losartan (COZAAR) 50 MG tablet Take 1 tablet (50 mg total) by mouth once daily.    metoprolol tartrate (LOPRESSOR) 25 MG tablet Take 1 tablet (25 mg total) by mouth 2 (two) times daily.          While in the hospital, will manage blood pressure as follows; Continue home antihypertensive regimen    Will utilize p.r.n. blood pressure medication only if patient's blood pressure greater than  180/110 and she develops symptoms such as worsening chest pain or shortness of breath.        Chronic pain syndrome  Continue home opioids.    Iron deficiency anemia secondary to inadequate dietary iron intake  Patient's anemia is currently controlled. Has not received any PRBCs to  date.. Etiology likely d/t iron def  Current CBC reviewed-   Lab Results   Component Value Date    HGB 9.9 (L) 02/02/2022    HCT 31.2 (L) 02/02/2022     Monitor serial CBC and transfuse if patient becomes hemodynamically unstable, symptomatic or H/H drops below 7/21.          Debility  Consult PT and OT    Secondary neuroendocrine tumor of liver  Chronic followed by oncology.  Status post chemotherapy in July of 2021.       VTE Risk Mitigation (From admission, onward)         Ordered     enoxaparin injection 40 mg  Daily         02/02/22 1336     IP VTE HIGH RISK PATIENT  Once         02/02/22 1336     Place sequential compression device  Until discontinued         02/02/22 1336                Discharge Planning   JULIO C:      Code Status: Full Code   Is the patient medically ready for discharge?:     Reason for patient still in hospital (select all that apply): Laboratory test  Discharge Plan A: Home Health   Discharge Delays: None known at this time              Eleno Osborne MD  Department of Hospital Medicine   [facility]

## 2022-02-04 NOTE — PLAN OF CARE
SW sent HH orders via careport to [facility].     SW will continue to follow pt throughout her transitions of care and assist with any dc needs.     Future Appointments   Date Time Provider Department Center   2/10/2022 10:20 AM Ervin Parikh MD [facility] UROLOGY [facility]   2/15/2022  2:00 PM Yasir Myers Jr., MD [facility]        02/04/22 1141   Post-Acute Status   Post-Acute Authorization Home Health   Home Health Status Referrals Sent   Discharge Delays None known at this time   Discharge Plan   Discharge Plan A Home Health   Discharge Plan B Home with family

## 2022-02-04 NOTE — PROGRESS NOTES
Urology    CHARY:    Impression:     No hydronephrosis.     Nonobstructing renal calculi.    Ok for diet and to d/c, follow up in office in 2-3 weeks with her urologist.    Ervin Parikh MD

## 2022-02-04 NOTE — PLAN OF CARE
Pt was accepted by [facility] for HH services.     Pt was accepted by [facility] for IV abx. SW was made aware they will follow pt.

## 2022-02-04 NOTE — PLAN OF CARE
Problem: Adult Inpatient Plan of Care  Goal: Plan of Care Review  2/4/2022 0207 by Marycruz Page RN  Outcome: Ongoing, Progressing  2/4/2022 0207 by Marycruz Page RN  Outcome: Ongoing, Progressing     Problem: Fall Injury Risk  Goal: Absence of Fall and Fall-Related Injury  2/4/2022 0207 by Marycruz Page RN  Outcome: Ongoing, Progressing  2/4/2022 0207 by Marycruz Page RN  Outcome: Ongoing, Progressing     Problem: UTI (Urinary Tract Infection)  Goal: Improved Infection Symptoms  2/4/2022 0207 by Marycruz Page RN  Outcome: Ongoing, Progressing  2/4/2022 0207 by Marycruz Page RN  Outcome: Ongoing, Progressing     Plan of care reviewed with pt. Pt kept NPO since midnight for procedure planned today. Post void residual checked; see flowsheets. Prn oxycodone for c/o abd pain. Safety maintained.

## 2022-02-04 NOTE — PROGRESS NOTES
Pharmacist Renal Dose Adjustment Note    Patito Domingo is a 71 y.o. female being treated with the medication merrem    Patient Data:    Vital Signs (Most Recent):  Temp: 97.7 °F (36.5 °C) (02/04/22 0744)  Pulse: 66 (02/04/22 0744)  Resp: 18 (02/04/22 0909)  BP: (!) 157/65 (02/04/22 0744)  SpO2: 99 % (02/04/22 0744)   Vital Signs (72h Range):  Temp:  [96.6 °F (35.9 °C)-98.4 °F (36.9 °C)]   Pulse:  []   Resp:  [14-20]   BP: (117-185)/(58-86)   SpO2:  [95 %-100 %]      Recent Labs   Lab 02/02/22 0602   CREATININE 0.9     Serum creatinine: 0.9 mg/dL 02/02/22 0602  Estimated creatinine clearance: 62.4 mL/min    Medication:merrem dose: 500mg frequency q8h will be changed to medication:merrem dose:1gram frequency:q8h    Pharmacist's Name: Jj Ramirez  Pharmacist's Extension: [phone]

## 2022-02-04 NOTE — SUBJECTIVE & OBJECTIVE
Interval History:  Pain improving and without urinary retention.  Continuing IV antibiotics and awaiting culture results.  Patient does not think that she can manage IV antibiotics with home health.    Review of Systems   Constitutional: Negative for fever.   Respiratory: Negative for shortness of breath.    Gastrointestinal: Positive for abdominal distention, abdominal pain and nausea.   Neurological: Negative for weakness.     Objective:     Vital Signs (Most Recent):  Temp: 96.4 °F (35.8 °C) (02/04/22 1210)  Pulse: 67 (02/04/22 1210)  Resp: 18 (02/04/22 1419)  BP: (!) 147/65 (02/04/22 1210)  SpO2: 100 % (02/04/22 1223) Vital Signs (24h Range):  Temp:  [96.4 °F (35.8 °C)-98.4 °F (36.9 °C)] 96.4 °F (35.8 °C)  Pulse:  [52-71] 67  Resp:  [14-18] 18  SpO2:  [99 %-100 %] 100 %  BP: (124-157)/(58-71) 147/65     Weight: 78.5 kg (173 lb 1 oz)  Body mass index is 27.93 kg/m².    Intake/Output Summary (Last 24 hours) at 2/4/2022 1454  Last data filed at 2/4/2022 0800  Gross per 24 hour   Intake 100 ml   Output 825 ml   Net -725 ml      Physical Exam  Vitals and nursing note reviewed.   Constitutional:       Appearance: Normal appearance. She is well-developed. She is not ill-appearing.   HENT:      Head: Normocephalic and atraumatic.      Right Ear: External ear normal.      Left Ear: External ear normal.      Nose: Nose normal.      Mouth/Throat:      Mouth: Mucous membranes are moist.      Comments: Poor dentition  Eyes:      Conjunctiva/sclera: Conjunctivae normal.      Pupils: Pupils are equal, round, and reactive to light.   Cardiovascular:      Rate and Rhythm: Normal rate and regular rhythm.      Heart sounds: Normal heart sounds.   Pulmonary:      Effort: Pulmonary effort is normal.      Breath sounds: Normal breath sounds.   Abdominal:      General: Bowel sounds are normal. There is no distension.      Palpations: Abdomen is soft. There is no mass.      Tenderness: There is abdominal tenderness. There is guarding  (Suprapubic tenderness).   Musculoskeletal:         General: Normal range of motion.      Cervical back: Normal range of motion and neck supple.   Skin:     General: Skin is warm and dry.   Neurological:      General: No focal deficit present.      Mental Status: She is alert and oriented to person, place, and time. Mental status is at baseline.      Comments: Generalized weakness   Psychiatric:         Behavior: Behavior normal.         Judgment: Judgment normal.         Significant Labs: All pertinent labs within the past 24 hours have been reviewed.    Significant Imaging: I have reviewed all pertinent imaging results/findings within the past 24 hours.

## 2022-02-04 NOTE — PT/OT/SLP PROGRESS
Occupational Therapy   Treatment    Name: Patito Domingo  MRN: [id number]  Admitting Diagnosis:  Urinary tract infection without hematuria       Recommendations:     Discharge Recommendations: home health OT,home health PT  Discharge Equipment Recommendations:  grab bar  Barriers to discharge:  None    Assessment:   Patient preparing for urology procedure, but agreeable to ADL tasks at sink. Tolerated well. Patient will benefit from continued skilled OT to address deficits and improve performance in functional ADL tasks. Cont OT.    Patito Domingo is a 71 y.o. female with a medical diagnosis of Urinary tract infection without hematuria. Performance deficits affecting function are weakness,impaired endurance,impaired self care skills,impaired functional mobilty,gait instability,pain,decreased ROM.     Rehab Prognosis:  Good; patient would benefit from acute skilled OT services to address these deficits and reach maximum level of function.       Plan:     Patient to be seen 3 x/week to address the above listed problems via self-care/home management,therapeutic activities,therapeutic exercises  · Plan of Care Expires: 03/03/22  · Plan of Care Reviewed with: patient    Subjective     Pain/Comfort:  · Pain Rating 1: 7/10  · Location - Side 1: Bilateral  · Location - Orientation 1: generalized  · Location 1: abdomen    Objective:     Communicated with: Saad rodriguez prior to session.  Patient found HOB elevated with bed alarm,telemetry,peripheral IV upon OT entry to room.    General Precautions: Standard, fall,seizure   Orthopedic Precautions:N/A   Braces: N/A    Bed Mobility:    · Patient completed Scooting/Bridging with supervision  · Patient completed Supine to Sit with supervision and with side rail  · Patient completed Sit to Supine with stand by assistance     Functional Mobility/Transfers:  · Patient completed Sit <> Stand Transfer with stand by assistance  with  rollator     Activities of Daily  Living:  · Grooming: supervision seated at sink (rollator)    [facility] 6 Click ADL: 21    Treatment & Education:  Patient initially declined therapy 2/2 upcoming urology procedure, but was agreeable to ADLs. Bed mob as noted above; increased time and effort. Patient ambulated EOB <> sink using personal rollator /c SBA and flexed posture; able to slightly self-correct /c VCs. Patient returned to bed level and educated on importance of calling for toileting (A); verbalized understanding.    Patient left HOB elevated with all lines intact, call button in reach and nsg notifiedEducation:      GOALS:   Multidisciplinary Problems     Occupational Therapy Goals        Problem: Occupational Therapy Goal    Goal Priority Disciplines Outcome Interventions   Occupational Therapy Goal     OT, PT/OT Ongoing, Progressing    Description: Goals to be met by: 2/17/2022    Patient will increase functional independence with ADLs by performing:    UE Dressing with Modified [place].  LE Dressing with Modified [place].  Grooming while standing at sink with Modified [place].  Toileting from toilet with Modified [place] for hygiene and clothing management.   Step transfer with Modified [place]  Toilet transfer to toilet with Modified [place].  Upper extremity exercise program x15 reps per handout, with independence.                     Time Tracking:     OT Date of Treatment: 02/04/22  OT Start Time: 0845  OT Stop Time: 0902  OT Total Time (min): 17 min    Billable Minutes:Self Care/Home Management 17    OT/HERO: HERO MONAHAN Visit Number: 1    2/4/2022

## 2022-02-04 NOTE — PLAN OF CARE
SW met with pt via [place] to discuss dc planning.    SW expressed possible doctors recommendations of IV abx. SW expressed to pt that she may need IV abx upon dc. SW expressed that cultures are still being reviewed.    Pt expressed that she does not trust herself and lives alone and would not be able to adminster IV abx at home.     SW expressed that if pt has to dc with IV abx she can go to a SNF to receive IV abx. Pt declined SNF.      SW expressed to pt that its not certain if she would go home with IV abx and everything is still pending at this time. SW explained to pt that she is preparing ahead of time just in case she has to go home with IV abx.       SW expressed that she will check on pt at a later time when more information is provided. SW expressed to pt to contact her with any future questions or concerns.     Future Appointments   Date Time Provider Department Center   2/10/2022 10:20 AM Ervin Parikh MD [facility] UROLOGY [facility]   2/15/2022  2:00 PM [facility] Jr[facility]

## 2022-02-04 NOTE — PLAN OF CARE
Referral received for possible home IVABX at d/c.  Met with pt, introduced to home infusion, explained cultures are pending, possible may not require IV but would need to assess ability/willingness to infuse therapy.  Pt declines after demonstration of home infusion.  Pt states she has weakness and doesn't feel she can successfully infuse.  States she does not have family to support daily assistance, despite home health assistance.  She would be open to placement at [facility] but is hesitant about nursing home due to her ongoing chemo infusions.  CM team notified.  Pt hopeful for PO antibiotic therapy options.    [facility]  Maryanne Dhillon RN Clinical Liaison  Cell: [phone]  Office: [phone]   Fax:  [phone]

## 2022-02-04 NOTE — PLAN OF CARE
Problem: Physical Therapy Goal  Goal: Physical Therapy Goal  Description: Goals to be met by: 3/6/2022    Patient will increase functional independence with mobility by performin. Supine to sit with Modified [place]  2. Sit to supine with Modified [place]  3. Rolling to Left and Right with Modified [place].  4. Sit to stand transfer with Supervision  5. Gait  x 150 feet with Supervision using Rolling Walker.   6. Ascend/descend 12 steps with bilateral Handrails Contact Guard Assistance     Outcome: Ongoing, Progressing     Patient seen for physical therapy evaluation and tolerated well.  Patient performed bed mobility with SBA, transfers with CGA, and gait with Rollator x 25', 85' with Rollator and CGA, slow pace.  Anticipate patient will benefit from Home Health PT & OT after discharge.  No equipment needs.

## 2022-02-04 NOTE — PT/OT/SLP EVAL
"Physical Therapy Evaluation    Patient Name:  Patito Domingo   MRN:  [id number]    Recommendations:     Discharge Recommendations:  home health PT,home health OT   Discharge Equipment Recommendations: grab bar   Barriers to discharge: Decreased caregiver support    Assessment:     Patito Domingo is a 71 y.o. female admitted with a medical diagnosis of Urinary tract infection without hematuria.  She presents with the following impairments/functional limitations:  weakness,impaired balance,impaired endurance,pain,impaired self care skills,impaired functional mobilty,gait instability, and decreased lower extremity function.  Patient seen for physical therapy evaluation and tolerated well.  Patient performed bed mobility with SBA, transfers with CGA, and gait with Rollator x 25', 85'  and CGA, slow pace.  Anticipate patient will benefit from Home Health PT & OT after discharge.  No equipment needs.      Rehab Prognosis: Fair; patient would benefit from acute skilled PT services to address these deficits and reach maximum level of function.    Recent Surgery: * No surgery found *      Plan:     During this hospitalization, patient to be seen 3 x/week to address the identified rehab impairments via gait training,therapeutic activities,therapeutic exercises,neuromuscular re-education and progress toward the following goals:    · Plan of Care Expires:  03/16/22    Subjective     Chief Complaint: "I have not eaten ... I am NPO because I have a test this morning"  Patient/Family Comments/goals: To return home at [facility]  Pain/Comfort:  · Pain Rating 1: 8/10  · Location - Orientation 1: generalized  · Location 1: abdomen  · Pain Addressed 1: Pre-medicate for activity,Reposition,Distraction,Cessation of Activity  · Pain Rating Post-Intervention 1: 8/10    Patients cultural, spiritual, Yazidi conflicts given the current situation: no    Living Environment:  Patient lives alone in 2nd floor apartment with flight of stairs to " enter with B railings.  Bathroom has T/S with shower chair and grab bar.    Prior to admission, patients level of function was modified independent with gait with rollator, independent with ADLs, except family would supervise for baths due to increased fall risk (fell in bath 3 months ago).  Equipment used at home: bedside commode,walker, rolling,rollator,wheelchair,shower chair,grab bar.  DME owned (not currently used): none.  Upon discharge, patient will have assistance from family.    Objective:     Communicated with nurse prior to session.  Patient found supine with bed alarm,telemetry,peripheral IV  upon PT entry to room.    General Precautions: Standard, fall,seizure   Orthopedic Precautions:N/A   Braces: N/A  Respiratory Status: Room air    Exams:  · Cognitive Exam:  Patient is oriented to Person, Place, Time and Situation  · Fine Motor Coordination:    · -       Intact  Left hand thumb/finger opposition skills, Right hand thumb/finger opposition skills and Rapid alternating ankle DF/PF  · Gross Motor Coordination:  WFL  · Postural Exam:  Patient presented with the following abnormalities:    · -       Rounded shoulders  · -       Forward head  · Sensation:    · -       Intact  light/touch grossly intact.  Complains of left LE tingling  · Skin Integrity/Edema:      · -       Skin integrity: Visible skin intact  · RLE ROM: WFL  · RLE Strength: Deficits: 4/5 grossly  · LLE ROM: WFL  · LLE Strength: Deficits: 4/5 grossly    Functional Mobility:  · Bed Mobility:     · Rolling Left:  stand by assistance  · Scooting: stand by assistance  · Supine to Sit: stand by assistance  · Sit to Supine: stand by assistance  · Transfers:     · Sit to Stand:  contact guard assistance with rolling walker  · Gait: with Rollator x 25', 85' with Rollator and CGA, complains of fatigue and weakness due to being NPO  · Balance: Sitting:  SBA EOB sit   Standing:  With rollator, CGA, slightly unsteady    Therapeutic Activities and  Exercises:   Educated on role of physical therapy.     Educated on importance of OOB       AM-PAC 6 CLICK MOBILITY  Total Score:20     Patient left supine with all lines intact, call button in reach, bed alarm on and nurse notified.    GOALS:   Multidisciplinary Problems     Physical Therapy Goals        Problem: Physical Therapy Goal    Goal Priority Disciplines Outcome Goal Variances Interventions   Physical Therapy Goal     PT, PT/OT Ongoing, Progressing     Description: Goals to be met by: 3/6/2022    Patient will increase functional independence with mobility by performin. Supine to sit with Modified [place]  2. Sit to supine with Modified [place]  3. Rolling to Left and Right with Modified [place].  4. Sit to stand transfer with Supervision  5. Gait  x 150 feet with Supervision using Rolling Walker.   6. 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)        Past Surgical History:   Procedure Laterality Date    ABDOMINAL SURGERY      CATARACT EXTRACTION Left 10/2017     SECTION      CHOLECYSTECTOMY      COLON SURGERY      cystoscope      CYSTOSCOPY W/ RETROGRADES Right 10/10/2019    Procedure: CYSTOSCOPY, WITH RETROGRADE PYELOGRAM;  Surgeon: Gen Isbell MD;  [facility];  Service: Urology;  Laterality: Right;    ERCP N/A 2021    Procedure: ERCP (ENDOSCOPIC RETROGRADE CHOLANGIOPANCREATOGRAPHY);  Surgeon: Jayjay Rivera MD;  Location: [address]);  Service: Endoscopy;  Laterality: N/A;    EYE SURGERY      HYSTERECTOMY  1996    LITHOTRIPSY       LIVER BIOPSY  9/14    carcinoid    URETEROSCOPY Right 10/10/2019    Procedure: URETEROSCOPY;  Surgeon: Gen Isbell MD;  Location: [facility];  Service: Urology;  Laterality: Right;    UTERINE FIBROID SURGERY         Time Tracking:     PT Received On: 02/04/22  PT Start Time: 0948     PT Stop Time: 1016  PT Total Time (min): 28 min     Billable Minutes: Evaluation 15 and Gait Training 13      02/04/2022

## 2022-02-04 NOTE — ASSESSMENT & PLAN NOTE
Chronic, controlled.  Latest blood pressure and vitals reviewed-   Temp:  [96.4 °F (35.8 °C)-98.4 °F (36.9 °C)]   Pulse:  [52-71]   Resp:  [14-18]   BP: (124-157)/(58-71)   SpO2:  [99 %-100 %] .   Home meds for hypertension were reviewed and noted below.   Hypertension Medications             losartan (COZAAR) 50 MG tablet Take 1 tablet (50 mg total) by mouth once daily.    metoprolol tartrate (LOPRESSOR) 25 MG tablet Take 1 tablet (25 mg total) by mouth 2 (two) times daily.          While in the hospital, will manage blood pressure as follows; Continue home antihypertensive regimen    Will utilize p.r.n. blood pressure medication only if patient's blood pressure greater than  180/110 and she develops symptoms such as worsening chest pain or shortness of breath.

## 2022-02-04 NOTE — PLAN OF CARE
Problem: Occupational Therapy Goal  Goal: Occupational Therapy Goal  Description: Goals to be met by: 2/17/2022    Patient will increase functional independence with ADLs by performing:    UE Dressing with Modified [place].  LE Dressing with Modified [place].  Grooming while standing at sink with Modified [place].  Toileting from toilet with Modified [place] for hygiene and clothing management.   Step transfer with Modified [place]  Toilet transfer to toilet with Modified [place].  Upper extremity exercise program x15 reps per handout, with independence.    Outcome: Ongoing, Progressing     Patient preparing for urology procedure, but agreeable to ADL tasks at sink. Tolerated well. Patient will benefit from continued skilled OT to address deficits and improve performance in functional ADL tasks. Cont OT.

## 2022-02-05 PROCEDURE — 63600175 PHARM REV CODE 636 W HCPCS: Mod: HCNC | Performed by: HOSPITALIST

## 2022-02-05 PROCEDURE — 99900035 HC TECH TIME PER 15 MIN (STAT): Mod: HCNC

## 2022-02-05 PROCEDURE — 25000003 PHARM REV CODE 250: Mod: HCNC | Performed by: NURSE PRACTITIONER

## 2022-02-05 PROCEDURE — 63600175 PHARM REV CODE 636 W HCPCS: Mod: HCNC | Performed by: NURSE PRACTITIONER

## 2022-02-05 PROCEDURE — 97116 GAIT TRAINING THERAPY: CPT | Mod: HCNC,CQ

## 2022-02-05 PROCEDURE — 11000001 HC ACUTE MED/SURG PRIVATE ROOM: Mod: HCNC

## 2022-02-05 PROCEDURE — 25000003 PHARM REV CODE 250: Mod: HCNC | Performed by: HOSPITALIST

## 2022-02-05 PROCEDURE — 97530 THERAPEUTIC ACTIVITIES: CPT | Mod: HCNC,CQ

## 2022-02-05 RX ADMIN — OXYCODONE 15 MG: 5 TABLET ORAL at 06:02

## 2022-02-05 RX ADMIN — Medication 400 MG: at 09:02

## 2022-02-05 RX ADMIN — LACTOBACILLUS TAB 4 TABLET: TAB at 05:02

## 2022-02-05 RX ADMIN — ENOXAPARIN SODIUM 40 MG: 100 INJECTION SUBCUTANEOUS at 05:02

## 2022-02-05 RX ADMIN — MEROPENEM 1 G: 1 INJECTION, POWDER, FOR SOLUTION INTRAVENOUS at 12:02

## 2022-02-05 RX ADMIN — METOPROLOL TARTRATE 25 MG: 25 TABLET, FILM COATED ORAL at 09:02

## 2022-02-05 RX ADMIN — OXYCODONE 15 MG: 5 TABLET ORAL at 07:02

## 2022-02-05 RX ADMIN — Medication 400 MG: at 08:02

## 2022-02-05 RX ADMIN — ATORVASTATIN CALCIUM 40 MG: 40 TABLET, FILM COATED ORAL at 08:02

## 2022-02-05 RX ADMIN — LOPERAMIDE HYDROCHLORIDE 2 MG: 2 CAPSULE ORAL at 07:02

## 2022-02-05 RX ADMIN — LOSARTAN POTASSIUM 50 MG: 50 TABLET, FILM COATED ORAL at 09:02

## 2022-02-05 RX ADMIN — LOPERAMIDE HYDROCHLORIDE 2 MG: 2 CAPSULE ORAL at 03:02

## 2022-02-05 RX ADMIN — SIMETHICONE 80 MG: 80 TABLET, CHEWABLE ORAL at 12:02

## 2022-02-05 RX ADMIN — OXYCODONE 15 MG: 5 TABLET ORAL at 12:02

## 2022-02-05 RX ADMIN — MEROPENEM 1 G: 1 INJECTION, POWDER, FOR SOLUTION INTRAVENOUS at 09:02

## 2022-02-05 RX ADMIN — LACTOBACILLUS TAB 4 TABLET: TAB at 11:02

## 2022-02-05 RX ADMIN — MEROPENEM 1 G: 1 INJECTION, POWDER, FOR SOLUTION INTRAVENOUS at 05:02

## 2022-02-05 RX ADMIN — METOPROLOL TARTRATE 25 MG: 25 TABLET, FILM COATED ORAL at 08:02

## 2022-02-05 RX ADMIN — LOPERAMIDE HYDROCHLORIDE 2 MG: 2 CAPSULE ORAL at 09:02

## 2022-02-05 RX ADMIN — ZINC OXIDE TOPICAL OINT.: at 09:02

## 2022-02-05 NOTE — ASSESSMENT & PLAN NOTE
-bilateral hydronephrosis  -urology following; currently no plans for stent, will need outpatient follow-up

## 2022-02-05 NOTE — ASSESSMENT & PLAN NOTE
Chronic, controlled.  Latest blood pressure and vitals reviewed-   Temp:  [96.9 °F (36.1 °C)-97.6 °F (36.4 °C)]   Pulse:  [59-81]   Resp:  [15-18]   BP: (127-154)/(60-72)   SpO2:  [99 %-100 %] .   Home meds for hypertension were reviewed and noted below.   Hypertension Medications             losartan (COZAAR) 50 MG tablet Take 1 tablet (50 mg total) by mouth once daily.    metoprolol tartrate (LOPRESSOR) 25 MG tablet Take 1 tablet (25 mg total) by mouth 2 (two) times daily.          While in the hospital, will manage blood pressure as follows; Continue home antihypertensive regimen    Will utilize p.r.n. blood pressure medication only if patient's blood pressure greater than  180/110 and she develops symptoms such as worsening chest pain or shortness of breath.

## 2022-02-05 NOTE — ASSESSMENT & PLAN NOTE
Patient with positive nitrates although WBCs 13. Urine culture with Gram-negative rods.  Initiated on meropenem based on previous culture which demonstrated multi drug-resistant Klebsiella.  Consult Urology for recurrent UTI and hydronephrosis  -anticipate discharge pending culture results; could consider 7 days of IV meropenem if growing ESBL

## 2022-02-05 NOTE — PLAN OF CARE
Plan of care reviewed with patient, understanding verbalized.  PRN pain med administered per orders. No acute distress noted. Iv abx administered. BG monitored and SSI administered per orders. Frequent weight shift encouraged. instructed to call for any assistance, understanding verbalized. Bed alarm on, call light in reach, fall precautions in place. Will continue current plan of care.

## 2022-02-05 NOTE — PROGRESS NOTES
[facility]  Progress Note    Patient Name: Patito Domingo  MRN: [id number]  Patient Class: IP- Inpatient   Admission Date: 2/2/2022  Length of Stay: 1 days  Attending Physician: Eleno Osborne, *  Primary Care Provider: Yasir Myers Jr, MD        Subjective:     Principal Problem:Urinary tract infection without hematuria        HPI:  Patito Domingo is a 71 y.o. female with a PMH of colon cancer status post chemotherapy in July 2021, arthritis, hypertension, and kidney stones who presents to the ED for evaluation of generalized fatigue, weakness, and suprapubic abdominal pain for 2 days. She states her abdominal pain is presently and 9/10 and was unrelieved with home roxycontin.  She reports nausea. Denies vomiting or diarrhea.  She reports fair appetite.   It is noted patient has chronic abdominal pain however she states her pain is worse than normal and she has had persistent nausea unrelieved with home antiemetics.       CT of the abdomen with contrast demonstrated  Mild emphysematous changes of the lung parenchyma.   Dilatation of the biliary channels throughout the liver parenchyma which was seen previously on the examination obtained in November 2021. Bilateral hydronephrosis and hydroureter however there is no obvious obstructing kidney stone seen within the ureter on either side.  The level of hydronephrosis and hydroureter has increased as compared to the previous examination. Large calcifications involving the inferior collecting system of each kidney.  Patient is noted to have recurrent urinary tract infections secondary to Klebsiella which is multi-drug resistant.  She was initiated on meropenem in the ED.        Overview/Hospital Course:  No notes on file    Interval History:  Abdominal pain has gradually been improving.  Reports loose stools after being on antibiotics.  Discussed disposition pending culture results    Review of Systems   Constitutional:  Negative for fever.   Respiratory: Negative for shortness of breath.    Gastrointestinal: Positive for abdominal distention, abdominal pain and diarrhea.   Neurological: Negative for weakness.     Objective:     Vital Signs (Most Recent):  Temp: 97.4 °F (36.3 °C) (02/05/22 1127)  Pulse: 64 (02/05/22 1127)  Resp: 16 (02/05/22 1250)  BP: 133/60 (02/05/22 1127)  SpO2: 100 % (02/05/22 1127) Vital Signs (24h Range):  Temp:  [96.9 °F (36.1 °C)-97.6 °F (36.4 °C)] 97.4 °F (36.3 °C)  Pulse:  [59-81] 64  Resp:  [15-18] 16  SpO2:  [99 %-100 %] 100 %  BP: (127-154)/(60-72) 133/60     Weight: 78.5 kg (173 lb 1 oz)  Body mass index is 27.93 kg/m².  No intake or output data in the 24 hours ending 02/05/22 1305   Physical Exam  Vitals and nursing note reviewed.   Constitutional:       Appearance: Normal appearance. She is well-developed. She is not ill-appearing.   HENT:      Head: Normocephalic and atraumatic.      Right Ear: External ear normal.      Left Ear: External ear normal.      Nose: Nose normal.      Mouth/Throat:      Mouth: Mucous membranes are moist.      Comments: Poor dentition  Eyes:      Conjunctiva/sclera: Conjunctivae normal.      Pupils: Pupils are equal, round, and reactive to light.   Cardiovascular:      Rate and Rhythm: Normal rate and regular rhythm.      Heart sounds: Normal heart sounds.   Pulmonary:      Effort: Pulmonary effort is normal.      Breath sounds: Normal breath sounds.   Abdominal:      General: Bowel sounds are normal. There is no distension.      Palpations: Abdomen is soft. There is no mass.      Tenderness: There is abdominal tenderness. There is guarding (Suprapubic tenderness).   Musculoskeletal:         General: Normal range of motion.      Cervical back: Normal range of motion and neck supple.   Skin:     General: Skin is warm and dry.   Neurological:      General: No focal deficit present.      Mental Status: She is alert and oriented to person, place, and time. Mental status is at  baseline.      Comments: Generalized weakness   Psychiatric:         Behavior: Behavior normal.         Judgment: Judgment normal.         Significant Labs: All pertinent labs within the past 24 hours have been reviewed.    Significant Imaging: I have reviewed all pertinent imaging results/findings within the past 24 hours.      Assessment/Plan:      * Urinary tract infection without hematuria   Patient with positive nitrates although WBCs 13. Urine culture with Gram-negative rods.  Initiated on meropenem based on previous culture which demonstrated multi drug-resistant Klebsiella.  Consult Urology for recurrent UTI and hydronephrosis  -anticipate discharge pending culture results; could consider 7 days of IV meropenem if growing ESBL    Other hydronephrosis  -bilateral hydronephrosis  -urology following; currently no plans for stent, will need outpatient follow-up      Diarrhea  - likely 2/2 antibiotics  - imodium, probiotic      Staghorn calculus  -noted  -urology following  -IV antibiotics      Essential hypertension  Chronic, controlled.  Latest blood pressure and vitals reviewed-   Temp:  [96.9 °F (36.1 °C)-97.6 °F (36.4 °C)]   Pulse:  [59-81]   Resp:  [15-18]   BP: (127-154)/(60-72)   SpO2:  [99 %-100 %] .   Home meds for hypertension were reviewed and noted below.   Hypertension Medications             losartan (COZAAR) 50 MG tablet Take 1 tablet (50 mg total) by mouth once daily.    metoprolol tartrate (LOPRESSOR) 25 MG tablet Take 1 tablet (25 mg total) by mouth 2 (two) times daily.          While in the hospital, will manage blood pressure as follows; Continue home antihypertensive regimen    Will utilize p.r.n. blood pressure medication only if patient's blood pressure greater than  180/110 and she develops symptoms such as worsening chest pain or shortness of breath.        Chronic pain syndrome  Continue home opioids.    Iron deficiency anemia secondary to inadequate dietary iron intake  Patient's  anemia is currently controlled. Has not received any PRBCs to date.. Etiology likely d/t iron def  Current CBC reviewed-   Lab Results   Component Value Date    HGB 9.9 (L) 02/02/2022    HCT 31.2 (L) 02/02/2022     Monitor serial CBC and transfuse if patient becomes hemodynamically unstable, symptomatic or H/H drops below 7/21.          Debility  Consult PT and OT, recommended home health    Secondary neuroendocrine tumor of liver  Chronic followed by oncology.  Status post chemotherapy in July of 2021.       VTE Risk Mitigation (From admission, onward)         Ordered     enoxaparin injection 40 mg  Daily         02/02/22 1336     IP VTE HIGH RISK PATIENT  Once         02/02/22 1336     Place sequential compression device  Until discontinued         02/02/22 1336                Discharge Planning   JULIO C:      Code Status: Full Code   Is the patient medically ready for discharge?:     Reason for patient still in hospital (select all that apply): Laboratory test  Discharge Plan A: Home Health   Discharge Delays: None known at this time              Eleno Osborne MD  Department of Hospital Medicine   [facility]

## 2022-02-05 NOTE — SUBJECTIVE & OBJECTIVE
Interval History:  Abdominal pain has gradually been improving.  Reports loose stools after being on antibiotics.  Discussed disposition pending culture results    Review of Systems   Constitutional: Negative for fever.   Respiratory: Negative for shortness of breath.    Gastrointestinal: Positive for abdominal distention, abdominal pain and diarrhea.   Neurological: Negative for weakness.     Objective:     Vital Signs (Most Recent):  Temp: 97.4 °F (36.3 °C) (02/05/22 1127)  Pulse: 64 (02/05/22 1127)  Resp: 16 (02/05/22 1250)  BP: 133/60 (02/05/22 1127)  SpO2: 100 % (02/05/22 1127) Vital Signs (24h Range):  Temp:  [96.9 °F (36.1 °C)-97.6 °F (36.4 °C)] 97.4 °F (36.3 °C)  Pulse:  [59-81] 64  Resp:  [15-18] 16  SpO2:  [99 %-100 %] 100 %  BP: (127-154)/(60-72) 133/60     Weight: 78.5 kg (173 lb 1 oz)  Body mass index is 27.93 kg/m².  No intake or output data in the 24 hours ending 02/05/22 1305   Physical Exam  Vitals and nursing note reviewed.   Constitutional:       Appearance: Normal appearance. She is well-developed. She is not ill-appearing.   HENT:      Head: Normocephalic and atraumatic.      Right Ear: External ear normal.      Left Ear: External ear normal.      Nose: Nose normal.      Mouth/Throat:      Mouth: Mucous membranes are moist.      Comments: Poor dentition  Eyes:      Conjunctiva/sclera: Conjunctivae normal.      Pupils: Pupils are equal, round, and reactive to light.   Cardiovascular:      Rate and Rhythm: Normal rate and regular rhythm.      Heart sounds: Normal heart sounds.   Pulmonary:      Effort: Pulmonary effort is normal.      Breath sounds: Normal breath sounds.   Abdominal:      General: Bowel sounds are normal. There is no distension.      Palpations: Abdomen is soft. There is no mass.      Tenderness: There is abdominal tenderness. There is guarding (Suprapubic tenderness).   Musculoskeletal:         General: Normal range of motion.      Cervical back: Normal range of motion and neck  supple.   Skin:     General: Skin is warm and dry.   Neurological:      General: No focal deficit present.      Mental Status: She is alert and oriented to person, place, and time. Mental status is at baseline.      Comments: Generalized weakness   Psychiatric:         Behavior: Behavior normal.         Judgment: Judgment normal.         Significant Labs: All pertinent labs within the past 24 hours have been reviewed.    Significant Imaging: I have reviewed all pertinent imaging results/findings within the past 24 hours.

## 2022-02-05 NOTE — PT/OT/SLP PROGRESS
"Physical Therapy Treatment    Patient Name:  Patito Domingo   MRN:  [id number]    Recommendations:     Discharge Recommendations:  home health PT,home health OT   Discharge Equipment Recommendations: grab bar   Barriers to discharge: Decreased caregiver support    Assessment:     Patito Domingo is a 71 y.o. female admitted with a medical diagnosis of Urinary tract infection without hematuria.  She presents with the following impairments/functional limitations:  weakness,impaired endurance,impaired self care skills,impaired functional mobilty,gait instability,impaired balance,decreased coordination,decreased upper extremity function,decreased lower extremity function,decreased safety awareness,pain,decreased ROM,impaired coordination. Pt able to perform ambulation training ~30-35 ft within room using personal rollator, IV pole in tow, requiring CGA. Pt with decreased ambulation distance secondary to pt experiencing bouts of diarrhea. Would benefit from continued PT services to increase pt's independent functional mobility to level prior to admission.    Rehab Prognosis: Good; patient would benefit from acute skilled PT services to address these deficits and reach maximum level of function.    Recent Surgery: * No surgery found *      Plan:     During this hospitalization, patient to be seen 3 x/week to address the identified rehab impairments via gait training,therapeutic activities,therapeutic exercises,neuromuscular re-education and progress toward the following goals:    · Plan of Care Expires:  03/16/22    Subjective     Chief Complaint: None Complaints  Patient/Family Comments/goals: "I don't feel like I can walk in the hallway. I am having diarrhea".  Pain/Comfort:  · Pain Rating 1:  (Did not rate)  · Location - Orientation 1: generalized  · Location 1: abdomen  · Pain Addressed 1: Pre-medicate for activity,Reposition,Distraction,Cessation of Activity  · Pain Rating Post-Intervention 1:  (Did not " "rate)      Objective:     Communicated with nurse prior to session.  Patient found supine with bed alarm,peripheral IV,telemetry upon PT entry to room.     General Precautions: Standard, fall,seizure   Orthopedic Precautions:N/A   Braces: N/A  Respiratory Status: Room air     Functional Mobility:  · Bed Mobility:     · Rolling Left:  supervision and stand by assistance  · Scooting: supervision and stand by assistance  · Supine to Sit: stand by assistance  · Sit to Supine: stand by assistance  · Transfers:     · Sit to Stand:  contact guard assistance with personal rollator  · Gait:  ~30-35 ft with personal rollator, IV pole in tow, requiring CGA.      AM-PAC 6 CLICK MOBILITY  Turning over in bed (including adjusting bedclothes, sheets and blankets)?: 4  Sitting down on and standing up from a chair with arms (e.g., wheelchair, bedside commode, etc.): 4  Moving from lying on back to sitting on the side of the bed?: 4  Moving to and from a bed to a chair (including a wheelchair)?: 3  Need to walk in hospital room?: 3  Climbing 3-5 steps with a railing?: 2  Basic Mobility Total Score: 20       Therapeutic Activities and Exercises:   Upon entering room pt stated, "I will work with you but I can't go out in the hallway today. I have been having diarrhea". Pt able to perform 1 x 10 reps of sit to stand transfers from EOB with personal rollator positioned in front of pt requiring CGA and verbal/tactile cueing to decrease trunk flexion. Pt also able to perform ambulation training ~30-35 ft with personal rollator, IV pole in tow, requiring CGA. Pt demonstrates increased trunk flexion throughout requiring verbal/tactile cueing.    Patient left with all lines intact, call button in reach, bed alarm on and nurse notified..    GOALS:   Multidisciplinary Problems     Physical Therapy Goals        Problem: Physical Therapy Goal    Goal Priority Disciplines Outcome Goal Variances Interventions   Physical Therapy Goal     PT, PT/OT " Ongoing, Progressing     Description: Goals to be met by: 3/6/2022    Patient will increase functional independence with mobility by performin. Supine to sit with Modified [place]  2. Sit to supine with Modified [place]  3. Rolling to Left and Right with Modified [place].  4. Sit to stand transfer with Supervision  5. Gait  x 150 feet with Supervision using Rolling Walker.   6. Ascend/descend 12 steps with bilateral Handrails Contact Guard Assistance                      Time Tracking:     PT Received On: 22  PT Start Time: 1034     PT Stop Time: 1059  PT Total Time (min): 25 min     Billable Minutes: Gait Training   15 and Therapeutic Activity  10    Treatment Type: Treatment  PT/PTA: PTA     PTA Visit Number: 1     2022

## 2022-02-05 NOTE — PLAN OF CARE
Problem: Physical Therapy Goal  Goal: Physical Therapy Goal  Description: Goals to be met by: 3/6/2022    Patient will increase functional independence with mobility by performin. Supine to sit with Modified [place]  2. Sit to supine with Modified [place]  3. Rolling to Left and Right with Modified [place].  4. Sit to stand transfer with Supervision  5. Gait  x 150 feet with Supervision using Rolling Walker.   6. Ascend/descend 12 steps with bilateral Handrails Contact Guard Assistance     Outcome: Ongoing, Progressing   Pt continues to work toward all goals. Able to perform ambulation training ~30-35 ft within room using personal rollator, IV pole in tow requiring CGA. Ambulation distance decreased this session secondary to pt experiencing bouts of diarrhea.

## 2022-02-06 LAB — BACTERIA UR CULT: ABNORMAL

## 2022-02-06 PROCEDURE — 25000003 PHARM REV CODE 250: Mod: HCNC | Performed by: NURSE PRACTITIONER

## 2022-02-06 PROCEDURE — 99900035 HC TECH TIME PER 15 MIN (STAT): Mod: HCNC

## 2022-02-06 PROCEDURE — 25000003 PHARM REV CODE 250: Mod: HCNC | Performed by: HOSPITALIST

## 2022-02-06 PROCEDURE — 63600175 PHARM REV CODE 636 W HCPCS: Mod: HCNC | Performed by: HOSPITALIST

## 2022-02-06 PROCEDURE — 63600175 PHARM REV CODE 636 W HCPCS: Mod: HCNC | Performed by: NURSE PRACTITIONER

## 2022-02-06 PROCEDURE — 94761 N-INVAS EAR/PLS OXIMETRY MLT: CPT | Mod: HCNC

## 2022-02-06 PROCEDURE — 11000001 HC ACUTE MED/SURG PRIVATE ROOM: Mod: HCNC

## 2022-02-06 RX ORDER — DIPHENHYDRAMINE HCL 25 MG
25 CAPSULE ORAL EVERY 6 HOURS PRN
Status: DISCONTINUED | OUTPATIENT
Start: 2022-02-06 | End: 2022-02-09 | Stop reason: HOSPADM

## 2022-02-06 RX ADMIN — Medication 400 MG: at 08:02

## 2022-02-06 RX ADMIN — METOPROLOL TARTRATE 25 MG: 25 TABLET, FILM COATED ORAL at 09:02

## 2022-02-06 RX ADMIN — ZINC OXIDE TOPICAL OINT.: at 09:02

## 2022-02-06 RX ADMIN — LACTOBACILLUS TAB 4 TABLET: TAB at 01:02

## 2022-02-06 RX ADMIN — LACTOBACILLUS TAB 4 TABLET: TAB at 09:02

## 2022-02-06 RX ADMIN — OXYCODONE 15 MG: 5 TABLET ORAL at 01:02

## 2022-02-06 RX ADMIN — LOSARTAN POTASSIUM 50 MG: 50 TABLET, FILM COATED ORAL at 09:02

## 2022-02-06 RX ADMIN — DIPHENHYDRAMINE HYDROCHLORIDE 25 MG: 25 CAPSULE ORAL at 09:02

## 2022-02-06 RX ADMIN — ATORVASTATIN CALCIUM 40 MG: 40 TABLET, FILM COATED ORAL at 08:02

## 2022-02-06 RX ADMIN — MEROPENEM 1 G: 1 INJECTION, POWDER, FOR SOLUTION INTRAVENOUS at 01:02

## 2022-02-06 RX ADMIN — ENOXAPARIN SODIUM 40 MG: 100 INJECTION SUBCUTANEOUS at 05:02

## 2022-02-06 RX ADMIN — DOCUSATE SODIUM AND SENNOSIDES 1 TABLET: 8.6; 5 TABLET, FILM COATED ORAL at 09:02

## 2022-02-06 RX ADMIN — LACTOBACILLUS TAB 4 TABLET: TAB at 05:02

## 2022-02-06 RX ADMIN — METOPROLOL TARTRATE 25 MG: 25 TABLET, FILM COATED ORAL at 08:02

## 2022-02-06 RX ADMIN — OXYCODONE 15 MG: 5 TABLET ORAL at 07:02

## 2022-02-06 RX ADMIN — MEROPENEM 1 G: 1 INJECTION, POWDER, FOR SOLUTION INTRAVENOUS at 05:02

## 2022-02-06 RX ADMIN — MEROPENEM 1 G: 1 INJECTION, POWDER, FOR SOLUTION INTRAVENOUS at 09:02

## 2022-02-06 RX ADMIN — OXYCODONE 15 MG: 5 TABLET ORAL at 09:02

## 2022-02-06 RX ADMIN — Medication 400 MG: at 09:02

## 2022-02-06 NOTE — SUBJECTIVE & OBJECTIVE
Interval History: seen and examined at bedside. No acute events. No new complaints. Continuing IV abx. Cultures pending. PT recommend home with HH.     Review of Systems   Constitutional: Negative for activity change, chills, fatigue and fever.   HENT: Negative for congestion.    Eyes: Negative.    Respiratory: Negative for cough, shortness of breath and wheezing.    Cardiovascular: Negative for chest pain, palpitations and leg swelling.   Gastrointestinal: Negative for abdominal distention and abdominal pain.   Endocrine: Negative.    Genitourinary: Negative for difficulty urinating.   Musculoskeletal: Negative.    Neurological: Negative for dizziness and headaches.   Psychiatric/Behavioral: Negative for agitation.     Objective:     Vital Signs (Most Recent):  Temp: 97.5 °F (36.4 °C) (02/06/22 0725)  Pulse: 63 (02/06/22 0725)  Resp: 12 (02/06/22 0905)  BP: 136/63 (02/06/22 0725)  SpO2: 99 % (02/06/22 0725) Vital Signs (24h Range):  Temp:  [97.1 °F (36.2 °C)-98.5 °F (36.9 °C)] 97.5 °F (36.4 °C)  Pulse:  [59-71] 63  Resp:  [12-20] 12  SpO2:  [98 %-100 %] 99 %  BP: (119-158)/(57-68) 136/63     Weight: 78.5 kg (173 lb 1 oz)  Body mass index is 27.93 kg/m².  No intake or output data in the 24 hours ending 02/06/22 1029   Physical Exam  Constitutional:       General: She is not in acute distress.     Appearance: Normal appearance. She is not ill-appearing.   HENT:      Head: Normocephalic and atraumatic.   Eyes:      General: No scleral icterus.  Cardiovascular:      Rate and Rhythm: Normal rate.   Pulmonary:      Effort: No respiratory distress.   Musculoskeletal:      Right lower leg: No edema.      Left lower leg: No edema.   Skin:     Coloration: Skin is not jaundiced.   Neurological:      General: No focal deficit present.      Mental Status: She is alert and oriented to person, place, and time. Mental status is at baseline.   Psychiatric:         Mood and Affect: Mood normal.         Behavior: Behavior normal.          Thought Content: Thought content normal.         Judgment: Judgment normal.         Significant Labs: All pertinent labs within the past 24 hours have been reviewed.    Significant Imaging: I have reviewed all pertinent imaging results/findings within the past 24 hours.

## 2022-02-06 NOTE — PLAN OF CARE
Plan of care reviewed with patient, understanding verbalized.  PRN pain med administered per orders. No acute distress noted. Iv abx administered. Frequent weight shift encouraged. instructed to call for any assistance, understanding verbalized. Bed alarm on, call light in reach, fall precautions in place. Will continue current plan of care.

## 2022-02-06 NOTE — ASSESSMENT & PLAN NOTE
Chronic, controlled.  Latest blood pressure and vitals reviewed-   Temp:  [97.1 °F (36.2 °C)-98.5 °F (36.9 °C)]   Pulse:  [59-71]   Resp:  [12-20]   BP: (119-158)/(57-68)   SpO2:  [98 %-100 %] .   Home meds for hypertension were reviewed and noted below.   Hypertension Medications             losartan (COZAAR) 50 MG tablet Take 1 tablet (50 mg total) by mouth once daily.    metoprolol tartrate (LOPRESSOR) 25 MG tablet Take 1 tablet (25 mg total) by mouth 2 (two) times daily.          While in the hospital, will manage blood pressure as follows; Continue home antihypertensive regimen    Will utilize p.r.n. blood pressure medication only if patient's blood pressure greater than  180/110 and she develops symptoms such as worsening chest pain or shortness of breath.

## 2022-02-06 NOTE — ASSESSMENT & PLAN NOTE
Temp Readings from Last 3 Encounters:   02/06/22 97.5 °F (36.4 °C) (Oral)   01/08/22 97.9 °F (36.6 °C) (Oral)   12/29/21 97.9 °F (36.6 °C) (Oral)     Lab Results   Component Value Date    WBC 5.75 02/02/2022     No results for input(s): LACTATE in the last 72 hours.    Patient with positive nitrates although WBCs 13.   Urine culture with Gram-negative rods.    Initiated on meropenem based on previous culture which demonstrated multi drug-resistant Klebsiella.    Consultede Urology for recurrent UTI and hydronephrosis: Ok to d/c, follow up in office in 2-3 weeks with her urologist.  anticipate discharge pending culture results; could consider 7 days of IV meropenem if growing ESBL    Antibiotics given-   Antibiotics (From admission, onward)            Start     Stop Route Frequency Ordered    02/04/22 1700  meropenem 1 g in sodium chloride 0.9 % 100 mL IVPB (ready to mix system)         -- IV Every 8 hours (non-standard times) 02/04/22 1201        Cultures were taken-   Microbiology Results (last 7 days)     Procedure Component Value Units Date/Time    Urine Culture High Risk [[id number]]  (Abnormal) Collected: 02/02/22 0915    Order Status: Completed Specimen: Urine, Clean Catch Updated: 02/06/22 0642     Urine Culture, Routine GRAM NEGATIVE JADEN  >100,000 cfu/ml  Identification and susceptibility pending      Narrative:      Indicated criteria for high risk culture:->Prior to urologic  procedures

## 2022-02-06 NOTE — PROGRESS NOTES
[facility] Medicine  Telemedicine Progress Note    Patient Name: Patito Domingo  MRN: [id number]  Patient Class: IP- Inpatient   Admission Date: 2/2/2022  Length of Stay: 2 days  Attending Physician: Suly Calix MD  Primary Care Provider: Yasir Myers Jr, MD          Subjective:     Principal Problem:Urinary tract infection without hematuria        HPI:  Patito Domingo is a 71 y.o. female with a PMH of colon cancer status post chemotherapy in July 2021, arthritis, hypertension, and kidney stones who presents to the ED for evaluation of generalized fatigue, weakness, and suprapubic abdominal pain for 2 days. She states her abdominal pain is presently and 9/10 and was unrelieved with home roxycontin.  She reports nausea. Denies vomiting or diarrhea.  She reports fair appetite.   It is noted patient has chronic abdominal pain however she states her pain is worse than normal and she has had persistent nausea unrelieved with home antiemetics.       CT of the abdomen with contrast demonstrated  Mild emphysematous changes of the lung parenchyma.   Dilatation of the biliary channels throughout the liver parenchyma which was seen previously on the examination obtained in November 2021. Bilateral hydronephrosis and hydroureter however there is no obvious obstructing kidney stone seen within the ureter on either side.  The level of hydronephrosis and hydroureter has increased as compared to the previous examination. Large calcifications involving the inferior collecting system of each kidney.  Patient is noted to have recurrent urinary tract infections secondary to Klebsiella which is multi-drug resistant.  She was initiated on meropenem in the ED.        Overview/Hospital Course:  No notes on file    Interval History: seen and examined at bedside. No acute events. No new complaints. Continuing IV abx. Cultures pending. PT recommend home with HH.     Review of Systems   Constitutional:  Negative for activity change, chills, fatigue and fever.   HENT: Negative for congestion.    Eyes: Negative.    Respiratory: Negative for cough, shortness of breath and wheezing.    Cardiovascular: Negative for chest pain, palpitations and leg swelling.   Gastrointestinal: Negative for abdominal distention and abdominal pain.   Endocrine: Negative.    Genitourinary: Negative for difficulty urinating.   Musculoskeletal: Negative.    Neurological: Negative for dizziness and headaches.   Psychiatric/Behavioral: Negative for agitation.     Objective:     Vital Signs (Most Recent):  Temp: 97.5 °F (36.4 °C) (02/06/22 0725)  Pulse: 63 (02/06/22 0725)  Resp: 12 (02/06/22 0905)  BP: 136/63 (02/06/22 0725)  SpO2: 99 % (02/06/22 0725) Vital Signs (24h Range):  Temp:  [97.1 °F (36.2 °C)-98.5 °F (36.9 °C)] 97.5 °F (36.4 °C)  Pulse:  [59-71] 63  Resp:  [12-20] 12  SpO2:  [98 %-100 %] 99 %  BP: (119-158)/(57-68) 136/63     Weight: 78.5 kg (173 lb 1 oz)  Body mass index is 27.93 kg/m².  No intake or output data in the 24 hours ending 02/06/22 1029   Physical Exam  Constitutional:       General: She is not in acute distress.     Appearance: Normal appearance. She is not ill-appearing.   HENT:      Head: Normocephalic and atraumatic.   Eyes:      General: No scleral icterus.  Cardiovascular:      Rate and Rhythm: Normal rate.   Pulmonary:      Effort: No respiratory distress.   Musculoskeletal:      Right lower leg: No edema.      Left lower leg: No edema.   Skin:     Coloration: Skin is not jaundiced.   Neurological:      General: No focal deficit present.      Mental Status: She is alert and oriented to person, place, and time. Mental status is at baseline.   Psychiatric:         Mood and Affect: Mood normal.         Behavior: Behavior normal.         Thought Content: Thought content normal.         Judgment: Judgment normal.         Significant Labs: All pertinent labs within the past 24 hours have been reviewed.    Significant  Imaging: I have reviewed all pertinent imaging results/findings within the past 24 hours.      Assessment/Plan:      * Urinary tract infection without hematuria  Temp Readings from Last 3 Encounters:   02/06/22 97.5 °F (36.4 °C) (Oral)   01/08/22 97.9 °F (36.6 °C) (Oral)   12/29/21 97.9 °F (36.6 °C) (Oral)     Lab Results   Component Value Date    WBC 5.75 02/02/2022     No results for input(s): LACTATE in the last 72 hours.    Patient with positive nitrates although WBCs 13.   Urine culture with Gram-negative rods.    Initiated on meropenem based on previous culture which demonstrated multi drug-resistant Klebsiella.    Consultede Urology for recurrent UTI and hydronephrosis: Ok to d/c, follow up in office in 2-3 weeks with her urologist.  anticipate discharge pending culture results; could consider 7 days of IV meropenem if growing ESBL    Antibiotics given-   Antibiotics (From admission, onward)            Start     Stop Route Frequency Ordered    02/04/22 1700  meropenem 1 g in sodium chloride 0.9 % 100 mL IVPB (ready to mix system)         -- IV Every 8 hours (non-standard times) 02/04/22 1201        Cultures were taken-   Microbiology Results (last 7 days)     Procedure Component Value Units Date/Time    Urine Culture High Risk [[id number]]  (Abnormal) Collected: 02/02/22 0915    Order Status: Completed Specimen: Urine, Clean Catch Updated: 02/06/22 0642     Urine Culture, Routine GRAM NEGATIVE JADEN  >100,000 cfu/ml  Identification and susceptibility pending      Narrative:      Indicated criteria for high risk culture:->Prior to urologic  procedures          Other hydronephrosis  -bilateral hydronephrosis  -urology following; currently no plans for stent, will need outpatient follow-up      Diarrhea  - likely 2/2 antibiotics  - imodium, probiotic      Staghorn calculus  -noted  -urology following  -IV antibiotics      Essential hypertension  Chronic, controlled.  Latest blood pressure and vitals reviewed-    Temp:  [97.1 °F (36.2 °C)-98.5 °F (36.9 °C)]   Pulse:  [59-71]   Resp:  [12-20]   BP: (119-158)/(57-68)   SpO2:  [98 %-100 %] .   Home meds for hypertension were reviewed and noted below.   Hypertension Medications             losartan (COZAAR) 50 MG tablet Take 1 tablet (50 mg total) by mouth once daily.    metoprolol tartrate (LOPRESSOR) 25 MG tablet Take 1 tablet (25 mg total) by mouth 2 (two) times daily.          While in the hospital, will manage blood pressure as follows; Continue home antihypertensive regimen    Will utilize p.r.n. blood pressure medication only if patient's blood pressure greater than  180/110 and she develops symptoms such as worsening chest pain or shortness of breath.        Chronic pain syndrome  Continue home opioids.    Iron deficiency anemia secondary to inadequate dietary iron intake  Patient's anemia is currently controlled. Has not received any PRBCs to date.. Etiology likely d/t iron def  Current CBC reviewed-   Lab Results   Component Value Date    HGB 9.9 (L) 02/02/2022    HCT 31.2 (L) 02/02/2022     Monitor serial CBC and transfuse if patient becomes hemodynamically unstable, symptomatic or H/H drops below 7/21.          Debility  Consult PT and OT, recommended home health    Secondary neuroendocrine tumor of liver  Chronic followed by oncology.  Status post chemotherapy in July of 2021.       VTE Risk Mitigation (From admission, onward)         Ordered     enoxaparin injection 40 mg  Daily         02/02/22 1336     IP VTE HIGH RISK PATIENT  Once         02/02/22 1336     Place sequential compression device  Until discontinued         02/02/22 1336                      I have assessed these finding virtually using telemed platform and with assistance of bedside nurse                 The attending portion of this evaluation, treatment, and documentation was performed per Suly Duenas MD via Telemedicine AudioVisual using the secure [employer] software platform with 2 way  audio/video. The provider was located off-site and the patient is located in the hospital. The aforementioned video software was utilized to document the relevant history and physical exam    Suly Duenas MD  Department of [facility] Medicine   [facility]

## 2022-02-06 NOTE — CONSULTS
Thank you for your consult to [facility]. We have reviewed the patient chart. This patient does meet criteria for [facility] service at this time. Will assume care on 02/06/22 at 7AM.

## 2022-02-07 ENCOUNTER — PATIENT OUTREACH (OUTPATIENT)
Dept: ADMINISTRATIVE | Facility: OTHER | Age: 72
End: 2022-02-07
Payer: MEDICARE

## 2022-02-07 PROCEDURE — 94761 N-INVAS EAR/PLS OXIMETRY MLT: CPT | Mod: HCNC

## 2022-02-07 PROCEDURE — 25000003 PHARM REV CODE 250: Mod: HCNC | Performed by: HOSPITALIST

## 2022-02-07 PROCEDURE — 63600175 PHARM REV CODE 636 W HCPCS: Mod: HCNC | Performed by: HOSPITALIST

## 2022-02-07 PROCEDURE — 11000001 HC ACUTE MED/SURG PRIVATE ROOM: Mod: HCNC

## 2022-02-07 PROCEDURE — 99900035 HC TECH TIME PER 15 MIN (STAT): Mod: HCNC

## 2022-02-07 PROCEDURE — 63600175 PHARM REV CODE 636 W HCPCS: Mod: HCNC | Performed by: NURSE PRACTITIONER

## 2022-02-07 PROCEDURE — 97530 THERAPEUTIC ACTIVITIES: CPT | Mod: HCNC,CO

## 2022-02-07 PROCEDURE — 25000003 PHARM REV CODE 250: Mod: HCNC | Performed by: NURSE PRACTITIONER

## 2022-02-07 RX ADMIN — ZINC OXIDE TOPICAL OINT.: at 08:02

## 2022-02-07 RX ADMIN — Medication 400 MG: at 08:02

## 2022-02-07 RX ADMIN — MEROPENEM 1 G: 1 INJECTION, POWDER, FOR SOLUTION INTRAVENOUS at 05:02

## 2022-02-07 RX ADMIN — ENOXAPARIN SODIUM 40 MG: 100 INJECTION SUBCUTANEOUS at 05:02

## 2022-02-07 RX ADMIN — OXYCODONE 15 MG: 5 TABLET ORAL at 05:02

## 2022-02-07 RX ADMIN — LOSARTAN POTASSIUM 50 MG: 50 TABLET, FILM COATED ORAL at 08:02

## 2022-02-07 RX ADMIN — METOPROLOL TARTRATE 25 MG: 25 TABLET, FILM COATED ORAL at 08:02

## 2022-02-07 RX ADMIN — LACTOBACILLUS TAB 4 TABLET: TAB at 08:02

## 2022-02-07 RX ADMIN — OXYCODONE 15 MG: 5 TABLET ORAL at 06:02

## 2022-02-07 RX ADMIN — MEROPENEM 1 G: 1 INJECTION, POWDER, FOR SOLUTION INTRAVENOUS at 08:02

## 2022-02-07 RX ADMIN — OXYCODONE 15 MG: 5 TABLET ORAL at 11:02

## 2022-02-07 RX ADMIN — LACTOBACILLUS TAB 4 TABLET: TAB at 11:02

## 2022-02-07 RX ADMIN — LACTOBACILLUS TAB 4 TABLET: TAB at 05:02

## 2022-02-07 RX ADMIN — MEROPENEM 1 G: 1 INJECTION, POWDER, FOR SOLUTION INTRAVENOUS at 12:02

## 2022-02-07 RX ADMIN — ATORVASTATIN CALCIUM 40 MG: 40 TABLET, FILM COATED ORAL at 08:02

## 2022-02-07 NOTE — ASSESSMENT & PLAN NOTE
Temp Readings from Last 3 Encounters:   02/07/22 96.3 °F (35.7 °C) (Oral)   01/08/22 97.9 °F (36.6 °C) (Oral)   12/29/21 97.9 °F (36.6 °C) (Oral)     Lab Results   Component Value Date    WBC 5.75 02/02/2022     No results for input(s): LACTATE in the last 72 hours.    Patient with positive nitrates although WBCs 13.   Urine culture with multi drug-resistant Klebsiella.    Cont 7 days of IV meropenem   Consultede Urology for recurrent UTI and hydronephrosis: Ok to d/c, follow up in office in 2-3 weeks with her urologist.    Antibiotics given-   Antibiotics (From admission, onward)            Start     Stop Route Frequency Ordered    02/04/22 1700  meropenem 1 g in sodium chloride 0.9 % 100 mL IVPB (ready to mix system)         -- IV Every 8 hours (non-standard times) 02/04/22 1201        Cultures were taken-   Microbiology Results (last 7 days)     Procedure Component Value Units Date/Time    Urine Culture High Risk [[id number]]  (Abnormal)  (Susceptibility) Collected: 02/02/22 0915    Order Status: Completed Specimen: Urine, Clean Catch Updated: 02/06/22 1138     Urine Culture, Routine KLEBSIELLA PNEUMONIAE ESBL  >100,000 cfu/ml      Narrative:      Indicated criteria for high risk culture:->Prior to urologic  procedures

## 2022-02-07 NOTE — PLAN OF CARE
SW met with pt via Rogate[employer] to discuss dc planning.       SW discussed possible dc date as Wednesday after finishing IV abx. Pt expressed shes aware and spoke with the doctors. Pt did not have any questions or concerns for SW.     SW will continue to follow pt throughout her transitions of care and assist with any dc needs.     Future Appointments   Date Time Provider Department Center   2/10/2022 10:20 AM Ervin Parikh MD [facility] UROLOGY [facility]   2/15/2022  2:00 PM Yasir Myers Jr., MD [facility]

## 2022-02-07 NOTE — NURSING
Shift assessment complete. Pt complains of abdominal pain. Medicated per mar and will monitor for effectiveness. Bed in lowest position, locked, and side rails up x 3. Bed alarm on. Call light in reach.

## 2022-02-07 NOTE — ASSESSMENT & PLAN NOTE
Chronic, controlled.  Latest blood pressure and vitals reviewed-   Temp:  [96.3 °F (35.7 °C)-98.6 °F (37 °C)]   Pulse:  [58-68]   Resp:  [12-20]   BP: (104-134)/(54-60)   SpO2:  [97 %-100 %] .   Home meds for hypertension were reviewed and noted below.   Hypertension Medications             losartan (COZAAR) 50 MG tablet Take 1 tablet (50 mg total) by mouth once daily.    metoprolol tartrate (LOPRESSOR) 25 MG tablet Take 1 tablet (25 mg total) by mouth 2 (two) times daily.          While in the hospital, will manage blood pressure as follows; Continue home antihypertensive regimen    Will utilize p.r.n. blood pressure medication only if patient's blood pressure greater than  180/110 and she develops symptoms such as worsening chest pain or shortness of breath.

## 2022-02-07 NOTE — PROGRESS NOTES
IP Liaison - Initial Visit Note    Patient: Patito Domingo  MRN:  [id number]  Date of Service:  2/7/2022  Completed by:  MADHURI Matias    Reason for Visit   Patient presents with    IP Liaison Initial Visit       RSW met with patient at bedside in order to complete SDOH questionnaire and liaison assessment.  Pt has identified no social barriers to care.  Per pt, pt is not in need of resources at this time.    The following were addressed during this visit:  - Review SDOH Questions   - Complete initial visit with patient        Patient Summary     IP Liaison Patient Assessment    General  Level of Caregiver support: Member independent and does not need caregiver assistance  Have you had to make a decision between paying for any of the following in the last 2 months?: None  Assessments  Was the PHQ Depression Screening completed this visit?: No  Was the MARGARITA-7 Screening completed this visit?: No         MADHURI Matias

## 2022-02-07 NOTE — PLAN OF CARE
VN note: Patient chart, labs, and vitals reviewed. VN to continue to be available as needed.     Problem: Adult Inpatient Plan of Care  Goal: Plan of Care Review  Outcome: Ongoing, Progressing  Goal: Patient-Specific Goal (Individualized)  Outcome: Ongoing, Progressing  Goal: Absence of Hospital-Acquired Illness or Injury  Outcome: Ongoing, Progressing  Goal: Optimal Comfort and Wellbeing  Outcome: Ongoing, Progressing  Goal: Readiness for Transition of Care  Outcome: Ongoing, Progressing     Problem: Infection  Goal: Absence of Infection Signs and Symptoms  Outcome: Ongoing, Progressing     Problem: Skin Injury Risk Increased  Goal: Skin Health and Integrity  Outcome: Ongoing, Progressing     Problem: Fluid and Electrolyte Imbalance (Acute Kidney Injury/Impairment)  Goal: Fluid and Electrolyte Balance  Outcome: Ongoing, Progressing     Problem: Oral Intake Inadequate (Acute Kidney Injury/Impairment)  Goal: Optimal Nutrition Intake  Outcome: Ongoing, Progressing     Problem: Renal Function Impairment (Acute Kidney Injury/Impairment)  Goal: Effective Renal Function  Outcome: Ongoing, Progressing     Problem: Fall Injury Risk  Goal: Absence of Fall and Fall-Related Injury  Outcome: Ongoing, Progressing     Problem: UTI (Urinary Tract Infection)  Goal: Improved Infection Symptoms  Outcome: Ongoing, Progressing

## 2022-02-07 NOTE — PT/OT/SLP PROGRESS
Occupational Therapy   Treatment    Name: Patito Domingo  MRN: [id number]  Admitting Diagnosis:  Urinary tract infection without hematuria       Recommendations:     Discharge Recommendations: home health OT,home health PT  Discharge Equipment Recommendations:  grab bar  Barriers to discharge:  None    Assessment:   Patient tolerated activity fairly. Patient will benefit from continued skilled OT to address deficits and improve performance in functional ADL tasks. Cont OT.    Patito Domingo is a 71 y.o. female with a medical diagnosis of Urinary tract infection without hematuria. Performance deficits affecting function are weakness,impaired endurance,impaired self care skills,impaired functional mobilty,gait instability,impaired balance,decreased coordination,decreased safety awareness,decreased ROM.     Rehab Prognosis:  Fair+; patient would benefit from acute skilled OT services to address these deficits and reach maximum level of function.       Plan:     Patient to be seen 3 x/week to address the above listed problems via self-care/home management,therapeutic activities,therapeutic exercises  · Plan of Care Expires: 03/03/22  · Plan of Care Reviewed with: patient    Subjective     Pain/Comfort:  · Pain Rating 1:  (did not rate)    Objective:     Communicated with: nurseHelena prior to session.  Patient found HOB elevated with bed alarm,telemetry upon OT entry to room.    General Precautions: Standard, fall,seizure   Orthopedic Precautions:N/A   Braces: N/A    Bed Mobility:    · Patient completed Scooting/Bridging with stand by assistance  · Patient completed Supine to Sit with minimum assistance  · Patient completed Sit to Supine with stand by assistance     Functional Mobility/Transfers:  · Patient completed Sit <> Stand Transfer with stand by assistance and contact guard assistance  with  rollator     Activities of Daily Living:  · N/A    [facility] 6 Click ADL: 21    Treatment & Education:  Patient initially  requesting OT return later, but then was agreeable to therapy. Bed mob noted as above. Patient ambulated in room back and forth x 2 using rollator /c CGA and flexed posture, despite cues.     Patient left HOB elevated with all lines intact, call button in reach and bed alarm onEducation:      GOALS:   Multidisciplinary Problems     Occupational Therapy Goals        Problem: Occupational Therapy Goal    Goal Priority Disciplines Outcome Interventions   Occupational Therapy Goal     OT, PT/OT Ongoing, Progressing    Description: Goals to be met by: 2/17/2022    Patient will increase functional independence with ADLs by performing:    UE Dressing with Modified [place].  LE Dressing with Modified [place].  Grooming while standing at sink with Modified [place].  Toileting from toilet with Modified [place] for hygiene and clothing management.   Step transfer with Modified [place]  Toilet transfer to toilet with Modified [place].  Upper extremity exercise program x15 reps per handout, with independence.                     Time Tracking:     OT Date of Treatment: 02/07/22  OT Start Time: 1139  OT Stop Time: 1151  OT Total Time (min): 12 min    Billable Minutes:Therapeutic Activity 12    OT/HERO: HERO MONAHAN Visit Number: 2    2/7/2022

## 2022-02-07 NOTE — PLAN OF CARE
Problem: Occupational Therapy Goal  Goal: Occupational Therapy Goal  Description: Goals to be met by: 2/17/2022    Patient will increase functional independence with ADLs by performing:    UE Dressing with Modified [place].  LE Dressing with Modified [place].  Grooming while standing at sink with Modified [place].  Toileting from toilet with Modified [place] for hygiene and clothing management.   Step transfer with Modified [place]  Toilet transfer to toilet with Modified [place].  Upper extremity exercise program x15 reps per handout, with independence.    Outcome: Ongoing, Progressing     Patient tolerated activity fairly. Patient will benefit from continued skilled OT to address deficits and improve performance in functional ADL tasks. Cont OT.

## 2022-02-07 NOTE — PROGRESS NOTES
[facility] Medicine  Telemedicine Progress Note    Patient Name: Patito Domingo  MRN: [id number]  Patient Class: IP- Inpatient   Admission Date: 2/2/2022  Length of Stay: 3 days  Attending Physician: Suly Calix MD  Primary Care Provider: Yasir Myers Jr, MD          Subjective:     Principal Problem:Urinary tract infection without hematuria        HPI:  Patito Domingo is a 71 y.o. female with a PMH of colon cancer status post chemotherapy in July 2021, arthritis, hypertension, and kidney stones who presents to the ED for evaluation of generalized fatigue, weakness, and suprapubic abdominal pain for 2 days. She states her abdominal pain is presently and 9/10 and was unrelieved with home roxycontin.  She reports nausea. Denies vomiting or diarrhea.  She reports fair appetite.   It is noted patient has chronic abdominal pain however she states her pain is worse than normal and she has had persistent nausea unrelieved with home antiemetics.       CT of the abdomen with contrast demonstrated  Mild emphysematous changes of the lung parenchyma.   Dilatation of the biliary channels throughout the liver parenchyma which was seen previously on the examination obtained in November 2021. Bilateral hydronephrosis and hydroureter however there is no obvious obstructing kidney stone seen within the ureter on either side.  The level of hydronephrosis and hydroureter has increased as compared to the previous examination. Large calcifications involving the inferior collecting system of each kidney.  Patient is noted to have recurrent urinary tract infections secondary to Klebsiella which is multi-drug resistant.  She was initiated on meropenem in the ED.        Overview/Hospital Course:  No notes on file    Interval History: seen and examined at bedside. No acute events. No new complaints. Continuing IV abx for MDR Klebsiella. PT recommend home with HH.     Review of Systems   Constitutional:  Negative for activity change, chills, fatigue and fever.   HENT: Negative for congestion.    Eyes: Negative.    Respiratory: Negative for cough, shortness of breath and wheezing.    Cardiovascular: Negative for chest pain, palpitations and leg swelling.   Gastrointestinal: Negative for abdominal distention and abdominal pain.   Endocrine: Negative.    Genitourinary: Negative for difficulty urinating.   Musculoskeletal: Negative.    Neurological: Negative for dizziness and headaches.   Psychiatric/Behavioral: Negative for agitation.     Objective:     Vital Signs (Most Recent):  Temp: 96.3 °F (35.7 °C) (02/07/22 0448)  Pulse: (!) 58 (02/07/22 0751)  Resp: 16 (02/07/22 0751)  BP: (!) 125/57 (02/07/22 0448)  SpO2: 100 % (02/07/22 0751) Vital Signs (24h Range):  Temp:  [96.3 °F (35.7 °C)-98.6 °F (37 °C)] 96.3 °F (35.7 °C)  Pulse:  [58-68] 58  Resp:  [12-20] 16  SpO2:  [97 %-100 %] 100 %  BP: (104-134)/(54-60) 125/57     Weight: 78.5 kg (173 lb 1 oz)  Body mass index is 27.93 kg/m².    Intake/Output Summary (Last 24 hours) at 2/7/2022 0840  Last data filed at 2/7/2022 0502  Gross per 24 hour   Intake 350 ml   Output --   Net 350 ml      Physical Exam  Constitutional:       General: She is not in acute distress.     Appearance: Normal appearance. She is not ill-appearing.   HENT:      Head: Normocephalic and atraumatic.   Eyes:      General: No scleral icterus.  Cardiovascular:      Rate and Rhythm: Normal rate.   Pulmonary:      Effort: No respiratory distress.   Musculoskeletal:      Right lower leg: No edema.      Left lower leg: No edema.   Skin:     Coloration: Skin is not jaundiced.   Neurological:      General: No focal deficit present.      Mental Status: She is alert and oriented to person, place, and time. Mental status is at baseline.   Psychiatric:         Mood and Affect: Mood normal.         Behavior: Behavior normal.         Thought Content: Thought content normal.         Judgment: Judgment normal.          Significant Labs: All pertinent labs within the past 24 hours have been reviewed.    Significant Imaging: I have reviewed all pertinent imaging results/findings within the past 24 hours.      Assessment/Plan:      * Urinary tract infection without hematuria  Temp Readings from Last 3 Encounters:   02/07/22 96.3 °F (35.7 °C) (Oral)   01/08/22 97.9 °F (36.6 °C) (Oral)   12/29/21 97.9 °F (36.6 °C) (Oral)     Lab Results   Component Value Date    WBC 5.75 02/02/2022     No results for input(s): LACTATE in the last 72 hours.    Patient with positive nitrates although WBCs 13.   Urine culture with multi drug-resistant Klebsiella.    Cont 7 days of IV meropenem   Consultede Urology for recurrent UTI and hydronephrosis: Ok to d/c, follow up in office in 2-3 weeks with her urologist.    Antibiotics given-   Antibiotics (From admission, onward)            Start     Stop Route Frequency Ordered    02/04/22 1700  meropenem 1 g in sodium chloride 0.9 % 100 mL IVPB (ready to mix system)         -- IV Every 8 hours (non-standard times) 02/04/22 1201        Cultures were taken-   Microbiology Results (last 7 days)     Procedure Component Value Units Date/Time    Urine Culture High Risk [[id number]]  (Abnormal)  (Susceptibility) Collected: 02/02/22 0915    Order Status: Completed Specimen: Urine, Clean Catch Updated: 02/06/22 1138     Urine Culture, Routine KLEBSIELLA PNEUMONIAE ESBL  >100,000 cfu/ml      Narrative:      Indicated criteria for high risk culture:->Prior to urologic  procedures          Other hydronephrosis  -bilateral hydronephrosis  -urology following; currently no plans for stent, will need outpatient follow-up      Diarrhea  - likely 2/2 antibiotics  - imodium, probiotic      Staghorn calculus  -noted  -urology following  -IV antibiotics      Essential hypertension  Chronic, controlled.  Latest blood pressure and vitals reviewed-   Temp:  [96.3 °F (35.7 °C)-98.6 °F (37 °C)]   Pulse:  [58-68]   Resp:   [12-20]   BP: (104-134)/(54-60)   SpO2:  [97 %-100 %] .   Home meds for hypertension were reviewed and noted below.   Hypertension Medications             losartan (COZAAR) 50 MG tablet Take 1 tablet (50 mg total) by mouth once daily.    metoprolol tartrate (LOPRESSOR) 25 MG tablet Take 1 tablet (25 mg total) by mouth 2 (two) times daily.          While in the hospital, will manage blood pressure as follows; Continue home antihypertensive regimen    Will utilize p.r.n. blood pressure medication only if patient's blood pressure greater than  180/110 and she develops symptoms such as worsening chest pain or shortness of breath.        Chronic pain syndrome  Continue home opioids.    Iron deficiency anemia secondary to inadequate dietary iron intake  Patient's anemia is currently controlled. Has not received any PRBCs to date.. Etiology likely d/t iron def  Current CBC reviewed-   Lab Results   Component Value Date    HGB 9.9 (L) 02/02/2022    HCT 31.2 (L) 02/02/2022     Monitor serial CBC and transfuse if patient becomes hemodynamically unstable, symptomatic or H/H drops below 7/21.          Debility  Consult PT and OT, recommended home health    Secondary neuroendocrine tumor of liver  Chronic followed by oncology.  Status post chemotherapy in July of 2021.       VTE Risk Mitigation (From admission, onward)         Ordered     enoxaparin injection 40 mg  Daily         02/02/22 1336     IP VTE HIGH RISK PATIENT  Once         02/02/22 1336     Place sequential compression device  Until discontinued         02/02/22 1336                      I have assessed these finding virtually using telemed platform and with assistance of bedside nurse                 The attending portion of this evaluation, treatment, and documentation was performed per Suly Duenas MD via Telemedicine AudioVisual using the secure [employer] software platform with 2 way audio/video. The provider was located off-site and the patient is located in the  [facility]. The aforementioned video software was utilized to document the relevant history and physical exam    Suly Duenas MD  Department of [facility] Medicine   [place]

## 2022-02-07 NOTE — SUBJECTIVE & OBJECTIVE
Interval History: seen and examined at bedside. No acute events. No new complaints. Continuing IV abx for MDR Klebsiella. PT recommend home with HH.     Review of Systems   Constitutional: Negative for activity change, chills, fatigue and fever.   HENT: Negative for congestion.    Eyes: Negative.    Respiratory: Negative for cough, shortness of breath and wheezing.    Cardiovascular: Negative for chest pain, palpitations and leg swelling.   Gastrointestinal: Negative for abdominal distention and abdominal pain.   Endocrine: Negative.    Genitourinary: Negative for difficulty urinating.   Musculoskeletal: Negative.    Neurological: Negative for dizziness and headaches.   Psychiatric/Behavioral: Negative for agitation.     Objective:     Vital Signs (Most Recent):  Temp: 96.3 °F (35.7 °C) (02/07/22 0448)  Pulse: (!) 58 (02/07/22 0751)  Resp: 16 (02/07/22 0751)  BP: (!) 125/57 (02/07/22 0448)  SpO2: 100 % (02/07/22 0751) Vital Signs (24h Range):  Temp:  [96.3 °F (35.7 °C)-98.6 °F (37 °C)] 96.3 °F (35.7 °C)  Pulse:  [58-68] 58  Resp:  [12-20] 16  SpO2:  [97 %-100 %] 100 %  BP: (104-134)/(54-60) 125/57     Weight: 78.5 kg (173 lb 1 oz)  Body mass index is 27.93 kg/m².    Intake/Output Summary (Last 24 hours) at 2/7/2022 0840  Last data filed at 2/7/2022 0502  Gross per 24 hour   Intake 350 ml   Output --   Net 350 ml      Physical Exam  Constitutional:       General: She is not in acute distress.     Appearance: Normal appearance. She is not ill-appearing.   HENT:      Head: Normocephalic and atraumatic.   Eyes:      General: No scleral icterus.  Cardiovascular:      Rate and Rhythm: Normal rate.   Pulmonary:      Effort: No respiratory distress.   Musculoskeletal:      Right lower leg: No edema.      Left lower leg: No edema.   Skin:     Coloration: Skin is not jaundiced.   Neurological:      General: No focal deficit present.      Mental Status: She is alert and oriented to person, place, and time. Mental status is at  baseline.   Psychiatric:         Mood and Affect: Mood normal.         Behavior: Behavior normal.         Thought Content: Thought content normal.         Judgment: Judgment normal.         Significant Labs: All pertinent labs within the past 24 hours have been reviewed.    Significant Imaging: I have reviewed all pertinent imaging results/findings within the past 24 hours.

## 2022-02-08 ENCOUNTER — PATIENT OUTREACH (OUTPATIENT)
Dept: ADMINISTRATIVE | Facility: OTHER | Age: 72
End: 2022-02-08
Payer: MEDICARE

## 2022-02-08 DIAGNOSIS — Z09 NEED FOR CASE MANAGEMENT FOLLOW-UP: Primary | ICD-10-CM

## 2022-02-08 PROCEDURE — 94761 N-INVAS EAR/PLS OXIMETRY MLT: CPT | Mod: HCNC

## 2022-02-08 PROCEDURE — 63600175 PHARM REV CODE 636 W HCPCS: Mod: HCNC | Performed by: NURSE PRACTITIONER

## 2022-02-08 PROCEDURE — 11000001 HC ACUTE MED/SURG PRIVATE ROOM: Mod: HCNC

## 2022-02-08 PROCEDURE — 63600175 PHARM REV CODE 636 W HCPCS: Mod: HCNC | Performed by: HOSPITALIST

## 2022-02-08 PROCEDURE — 25000003 PHARM REV CODE 250: Mod: HCNC | Performed by: HOSPITALIST

## 2022-02-08 PROCEDURE — 97535 SELF CARE MNGMENT TRAINING: CPT | Mod: HCNC,CO

## 2022-02-08 PROCEDURE — 25000003 PHARM REV CODE 250: Mod: HCNC | Performed by: NURSE PRACTITIONER

## 2022-02-08 PROCEDURE — 97530 THERAPEUTIC ACTIVITIES: CPT | Mod: HCNC,CQ

## 2022-02-08 PROCEDURE — 99900035 HC TECH TIME PER 15 MIN (STAT): Mod: HCNC

## 2022-02-08 RX ADMIN — MEROPENEM 1 G: 1 INJECTION, POWDER, FOR SOLUTION INTRAVENOUS at 08:02

## 2022-02-08 RX ADMIN — ATORVASTATIN CALCIUM 40 MG: 40 TABLET, FILM COATED ORAL at 09:02

## 2022-02-08 RX ADMIN — LACTOBACILLUS TAB 4 TABLET: TAB at 05:02

## 2022-02-08 RX ADMIN — ZINC OXIDE TOPICAL OINT.: at 08:02

## 2022-02-08 RX ADMIN — Medication 400 MG: at 09:02

## 2022-02-08 RX ADMIN — METOPROLOL TARTRATE 25 MG: 25 TABLET, FILM COATED ORAL at 08:02

## 2022-02-08 RX ADMIN — Medication 400 MG: at 08:02

## 2022-02-08 RX ADMIN — OXYCODONE 15 MG: 5 TABLET ORAL at 10:02

## 2022-02-08 RX ADMIN — SODIUM CHLORIDE 1 G: 9 INJECTION, SOLUTION INTRAVENOUS at 05:02

## 2022-02-08 RX ADMIN — LACTOBACILLUS TAB 4 TABLET: TAB at 11:02

## 2022-02-08 RX ADMIN — METOPROLOL TARTRATE 25 MG: 25 TABLET, FILM COATED ORAL at 09:02

## 2022-02-08 RX ADMIN — OXYCODONE 15 MG: 5 TABLET ORAL at 03:02

## 2022-02-08 RX ADMIN — MEROPENEM 1 G: 1 INJECTION, POWDER, FOR SOLUTION INTRAVENOUS at 12:02

## 2022-02-08 RX ADMIN — OXYCODONE 15 MG: 5 TABLET ORAL at 02:02

## 2022-02-08 RX ADMIN — ENOXAPARIN SODIUM 40 MG: 100 INJECTION SUBCUTANEOUS at 05:02

## 2022-02-08 RX ADMIN — OXYCODONE 15 MG: 5 TABLET ORAL at 09:02

## 2022-02-08 RX ADMIN — LACTOBACILLUS TAB 4 TABLET: TAB at 08:02

## 2022-02-08 RX ADMIN — LOSARTAN POTASSIUM 50 MG: 50 TABLET, FILM COATED ORAL at 08:02

## 2022-02-08 NOTE — PT/OT/SLP PROGRESS
Occupational Therapy   Treatment    Name: Patito Domingo  MRN: [id number]  Admitting Diagnosis:  Urinary tract infection without hematuria       Recommendations:     Discharge Recommendations: home health OT,home health PT  Discharge Equipment Recommendations:  grab bar  Barriers to discharge:  None    Assessment:   Patient making good progress and tolerated activity well. Patient will benefit from continued skilled OT to address deficits and improve performance in functional ADL tasks. Cont OT.    Patito Domingo is a 71 y.o. female with a medical diagnosis of Urinary tract infection without hematuria. Performance deficits affecting function are weakness,impaired endurance,impaired self care skills,impaired functional mobilty,gait instability,impaired balance,decreased safety awareness.     Rehab Prognosis:  Good and Fair; patient would benefit from acute skilled OT services to address these deficits and reach maximum level of function.       Plan:     Patient to be seen 3 x/week to address the above listed problems via self-care/home management,therapeutic activities,therapeutic exercises  · Plan of Care Expires: 03/03/22  · Plan of Care Reviewed with: patient    Subjective     Pain/Comfort:  · Pain Rating 1:  (did not rate)    Objective:     Communicated with: Helena rodriguez prior to session.  Patient found HOB elevated with telemetry upon OT entry to room.    General Precautions: Standard, fall,seizure   Orthopedic Precautions:N/A   Braces: N/A    Bed Mobility:    · Patient completed Scooting/Bridging with contact guard assistance  · Patient completed Supine to Sit with minimum assistance  · Patient completed Sit to Supine with contact guard assistance     Functional Mobility/Transfers:  · Patient completed Sit <> Stand Transfer with contact guard assistance  with  rollator   · Patient completed Toilet Transfer Step Transfer technique with contact guard assistance with  rollator    Activities of Daily  Living:  · Grooming: set-up and Sup from sink (seated at rollator)    · Toileting: supervision      [facility] 6 Click ADL: 21    Treatment & Education:  Patient agreeable to ADL tasks. Bed mob and ADLs as above. Ambulated 2 laps in room using rollator /c CGA.    Patient left HOB elevated with all lines intact, call button in reach and nsg notifiedEducation:      GOALS:   Multidisciplinary Problems     Occupational Therapy Goals        Problem: Occupational Therapy Goal    Goal Priority Disciplines Outcome Interventions   Occupational Therapy Goal     OT, PT/OT Ongoing, Progressing    Description: Goals to be met by: 2/17/2022    Patient will increase functional independence with ADLs by performing:    UE Dressing with Modified [place].  LE Dressing with Modified [place].  Grooming while standing at sink with Modified [place].  Toileting from toilet with Modified [place] for hygiene and clothing management.   Step transfer with Modified [place]  Toilet transfer to toilet with Modified [place].  Upper extremity exercise program x15 reps per handout, with independence.                     Time Tracking:     OT Date of Treatment: 02/08/22  OT Start Time: 1035  OT Stop Time: 1059  OT Total Time (min): 24 min    Billable Minutes:Self Care/Home Management 24    OT/HERO: HERO     HERO Visit Number: 3    2/8/2022

## 2022-02-08 NOTE — PT/OT/SLP PROGRESS
Physical Therapy Treatment    Patient Name:  Patito Domingo   MRN:  [id number]    Recommendations:     Discharge Recommendations:  home health PT,home health OT   Discharge Equipment Recommendations: grab bar   Barriers to discharge: Decreased caregiver support    Assessment:     Patito Domingo is a 71 y.o. female admitted with a medical diagnosis of Urinary tract infection without hematuria.  She presents with the following impairments/functional limitations:  weakness,decreased safety awareness,gait instability,decreased lower extremity function,impaired functional mobilty,decreased coordination,impaired endurance.    Rehab Prognosis: Good; patient would benefit from acute skilled PT services to address these deficits and reach maximum level of function.    Recent Surgery: * No surgery found *      Plan:     During this hospitalization, patient to be seen 3 x/week to address the identified rehab impairments via gait training,therapeutic activities,therapeutic exercises,neuromuscular re-education and progress toward the following goals:    · Plan of Care Expires:  03/16/22    Subjective     Chief Complaint: Pt with no complaints at this time.   Patient/Family Comments/goals:   Pain/Comfort:  · Pain Rating 1: 0/10      Objective:     Patient found HOB elevated with telemetry upon PT entry to room.     General Precautions: Standard, fall,seizure   Orthopedic Precautions:N/A   Braces: N/A  Respiratory Status: Room air     Functional Mobility:  · Bed Mobility:     · Supine to Sit: supervision  · Sit to Supine: supervision  · Transfers:     · Sit to Stand:  stand by assistance with personal rollator   · Gait: Pt ambulated 60 ft with rollator SBA requiring verbal cueing on proper turn technique to prevent LOB.   · Balance: Pt with good sitting and fair + standing balance.       AM-PAC 6 CLICK MOBILITY  Turning over in bed (including adjusting bedclothes, sheets and blankets)?: 4  Sitting down on and standing up from a  chair with arms (e.g., wheelchair, bedside commode, etc.): 4  Moving from lying on back to sitting on the side of the bed?: 4  Moving to and from a bed to a chair (including a wheelchair)?: 3  Need to walk in hospital room?: 3  Climbing 3-5 steps with a railing?: 2  Basic Mobility Total Score: 20       Therapeutic Activities and Exercises:       Patient left HOB elevated with all lines intact and call button in reach..    GOALS:   Multidisciplinary Problems     Physical Therapy Goals        Problem: Physical Therapy Goal    Goal Priority Disciplines Outcome Goal Variances Interventions   Physical Therapy Goal     PT, PT/OT Ongoing, Progressing     Description: Goals to be met by: 3/6/2022    Patient will increase functional independence with mobility by performin. Supine to sit with Modified [place]  2. Sit to supine with Modified [place]  3. Rolling to Left and Right with Modified [place].  4. Sit to stand transfer with Supervision  5. Gait  x 150 feet with Supervision using Rolling Walker.   6. Ascend/descend 12 steps with bilateral Handrails Contact Guard Assistance                      Time Tracking:     PT Received On: 22  PT Start Time: 1315     PT Stop Time: 1330  PT Total Time (min): 15 min     Billable Minutes: Therapeutic Activity 15    Treatment Type: Treatment  PT/PTA: PTA     PTA Visit Number: 2     2022

## 2022-02-08 NOTE — PLAN OF CARE
Problem: Occupational Therapy Goal  Goal: Occupational Therapy Goal  Description: Goals to be met by: 2/17/2022    Patient will increase functional independence with ADLs by performing:    UE Dressing with Modified [place].  LE Dressing with Modified [place].  Grooming while standing at sink with Modified [place].  Toileting from toilet with Modified [place] for hygiene and clothing management.   Step transfer with Modified [place]  Toilet transfer to toilet with Modified [place].  Upper extremity exercise program x15 reps per handout, with independence.    Outcome: Ongoing, Progressing     Patient making good progress and tolerated activity well. Patient will benefit from continued skilled OT to address deficits and improve performance in functional ADL tasks. Cont OT.

## 2022-02-08 NOTE — ASSESSMENT & PLAN NOTE
Temp Readings from Last 3 Encounters:   02/08/22 97.7 °F (36.5 °C) (Oral)   01/08/22 97.9 °F (36.6 °C) (Oral)   12/29/21 97.9 °F (36.6 °C) (Oral)     Lab Results   Component Value Date    WBC 5.75 02/02/2022     No results for input(s): LACTATE in the last 72 hours.    Patient with positive nitrates although WBCs 13.   Urine culture with multi drug-resistant Klebsiella.    Cont 7 days of IV meropenem   Consultede Urology for recurrent UTI and hydronephrosis: Ok to d/c, follow up in office in 2-3 weeks with her urologist.    Antibiotics given-   Antibiotics (From admission, onward)            Start     Stop Route Frequency Ordered    02/04/22 1700  meropenem 1 g in sodium chloride 0.9 % 100 mL IVPB (ready to mix system)         -- IV Every 8 hours (non-standard times) 02/04/22 1201        Cultures were taken-   Microbiology Results (last 7 days)     Procedure Component Value Units Date/Time    Urine Culture High Risk [[id number]]  (Abnormal)  (Susceptibility) Collected: 02/02/22 0915    Order Status: Completed Specimen: Urine, Clean Catch Updated: 02/06/22 1138     Urine Culture, Routine KLEBSIELLA PNEUMONIAE ESBL  >100,000 cfu/ml      Narrative:      Indicated criteria for high risk culture:->Prior to urologic  procedures

## 2022-02-08 NOTE — PROGRESS NOTES
02/08/2022 @ 2:51 PM- RSW attempted to contact pt via room phone to discuss discharge and additional barriers to care. RSW did not receive answer. RSW will follow up with patient at a later time.      IP Liaison - Final Visit Note    Patient: Patito Domingo  MRN:  [id number]  Date of Service:  2/10/2022  Completed by:  MADHURI Matias    Reason for Visit   Patient presents with    IP Liaison Chart Review     Patient discharged from hospital before RSW was able to complete follow-up visit.        Patient Summary     Discharge Date: 2/9/2022  Discharge telephone number/address: [phone] / [address] [facility] [address]  Follow up provider: Ervin Parikh MD / Yasir Myers Jr, MD  Follow up appointments: 2/15/2022 @ 10:30am / 2/15/2022 @ 2:00pm  Home Health agency & telephone number: [facility]  DME ordered &  name: n/a  Assigned OPCM RN/SW: n/a  Report sent to follow up team (PCP/OPCM) via in basket message: n/a  Community Resources arranged including agency name & contact info: Referred pt to [facility] ([facility])      MADHURI Matias

## 2022-02-08 NOTE — SUBJECTIVE & OBJECTIVE
Interval History: seen and examined at bedside. No acute events. No new complaints. Continuing IV abx for MDR Klebsiella. PT recommend home with HH.     Review of Systems   Constitutional: Negative for activity change, chills, fatigue and fever.   HENT: Negative for congestion.    Eyes: Negative.    Respiratory: Negative for cough, shortness of breath and wheezing.    Cardiovascular: Negative for chest pain, palpitations and leg swelling.   Gastrointestinal: Negative for abdominal distention and abdominal pain.   Endocrine: Negative.    Genitourinary: Negative for difficulty urinating.   Musculoskeletal: Negative.    Neurological: Negative for dizziness and headaches.   Psychiatric/Behavioral: Negative for agitation.     Objective:     Vital Signs (Most Recent):  Temp: 97.7 °F (36.5 °C) (02/08/22 0750)  Pulse: 69 (02/08/22 0750)  Resp: 18 (02/08/22 0750)  BP: (!) 165/67 (02/08/22 0750)  SpO2: 99 % (02/08/22 0750) Vital Signs (24h Range):  Temp:  [97.1 °F (36.2 °C)-98.6 °F (37 °C)] 97.7 °F (36.5 °C)  Pulse:  [63-75] 69  Resp:  [16-18] 18  SpO2:  [96 %-100 %] 99 %  BP: (119-165)/(58-69) 165/67     Weight: 78.5 kg (173 lb 1 oz)  Body mass index is 27.93 kg/m².  No intake or output data in the 24 hours ending 02/08/22 0818   Physical Exam  Constitutional:       General: She is not in acute distress.     Appearance: Normal appearance. She is not ill-appearing.   HENT:      Head: Normocephalic and atraumatic.   Eyes:      General: No scleral icterus.  Cardiovascular:      Rate and Rhythm: Normal rate.   Pulmonary:      Effort: No respiratory distress.   Musculoskeletal:      Right lower leg: No edema.      Left lower leg: No edema.   Skin:     Coloration: Skin is not jaundiced.   Neurological:      General: No focal deficit present.      Mental Status: She is alert and oriented to person, place, and time. Mental status is at baseline.   Psychiatric:         Mood and Affect: Mood normal.         Behavior: Behavior normal.          Thought Content: Thought content normal.         Judgment: Judgment normal.         Significant Labs: All pertinent labs within the past 24 hours have been reviewed.    Significant Imaging: I have reviewed all pertinent imaging results/findings within the past 24 hours.

## 2022-02-08 NOTE — NURSING
Shift assessment complete. Pt is alert and oriented x 4. Laying in bed watching TV. Bed in lowest position, locked, and side rails up x 3. Bed alarm on. Call light in reach.

## 2022-02-09 ENCOUNTER — PATIENT OUTREACH (OUTPATIENT)
Dept: ADMINISTRATIVE | Facility: OTHER | Age: 72
End: 2022-02-09
Payer: MEDICARE

## 2022-02-09 VITALS
BODY MASS INDEX: 27.81 KG/M2 | HEIGHT: 66 IN | RESPIRATION RATE: 20 BRPM | TEMPERATURE: 98 F | DIASTOLIC BLOOD PRESSURE: 67 MMHG | WEIGHT: 173.06 LBS | SYSTOLIC BLOOD PRESSURE: 147 MMHG | HEART RATE: 74 BPM | OXYGEN SATURATION: 98 %

## 2022-02-09 PROCEDURE — 25000003 PHARM REV CODE 250: Mod: HCNC | Performed by: NURSE PRACTITIONER

## 2022-02-09 PROCEDURE — 94761 N-INVAS EAR/PLS OXIMETRY MLT: CPT | Mod: HCNC

## 2022-02-09 PROCEDURE — 25000003 PHARM REV CODE 250: Mod: HCNC | Performed by: HOSPITALIST

## 2022-02-09 RX ADMIN — OXYCODONE 15 MG: 5 TABLET ORAL at 02:02

## 2022-02-09 RX ADMIN — LACTOBACILLUS TAB 4 TABLET: TAB at 01:02

## 2022-02-09 RX ADMIN — Medication 400 MG: at 08:02

## 2022-02-09 RX ADMIN — LACTOBACILLUS TAB 4 TABLET: TAB at 08:02

## 2022-02-09 RX ADMIN — LOPERAMIDE HYDROCHLORIDE 2 MG: 2 CAPSULE ORAL at 03:02

## 2022-02-09 RX ADMIN — LOSARTAN POTASSIUM 50 MG: 50 TABLET, FILM COATED ORAL at 09:02

## 2022-02-09 RX ADMIN — OXYCODONE 15 MG: 5 TABLET ORAL at 08:02

## 2022-02-09 RX ADMIN — METOPROLOL TARTRATE 25 MG: 25 TABLET, FILM COATED ORAL at 09:02

## 2022-02-09 RX ADMIN — ZINC OXIDE TOPICAL OINT.: at 01:02

## 2022-02-09 NOTE — PLAN OF CARE
1900 Pt appeared to be in no distress. Reported no pain.     1930 Pt found to have no PIV.  Attempted new site with no success.  Contacted ICU.      2140 ICU placed L posterior PIV  20     No Accu CK    No Tele     Bed in lowest position, wheels locked, non skid socks, ID band worn, personal items and call bell with in reach, bed alarm set.

## 2022-02-09 NOTE — DISCHARGE INSTRUCTIONS
Urinary Tract Infection Discharge Instructions, Adult (English) View Edit Remove  Heart Healthy Diet (English) View Edit Remove

## 2022-02-09 NOTE — PLAN OF CARE
[place] - Telemetry    HOME HEALTH ORDERS  FACE TO FACE ENCOUNTER    Patient Name: Patito Domingo  YOB: 1950    PCP: Yasir Myers Jr, MD   PCP Address: [address] [place] [address] [address] [place] [address]  PCP Phone Number: [phone]  PCP Fax: [phone]       Encounter Date: 02/09/2022    Admit to Home Health    Diagnoses:  Active Hospital Problems    Diagnosis  POA    *Urinary tract infection without hematuria [N39.0]  Yes    Other hydronephrosis [N13.39]  Yes    Diarrhea [R19.7]  Yes    Staghorn calculus [N20.0]  Yes     Chronic stable, follows with OSH urology      Essential hypertension [I10]  Yes     Chronic     bp well controlled on current       Chronic pain syndrome [G89.4]  Yes    Iron deficiency anemia secondary to inadequate dietary iron intake [D50.8]  Yes    Debility [R53.81]  Yes     Will make referral for hh ot pt eval and treat       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       Future Appointments   Date Time Provider Department Center   2/10/2022 10:20 AM Ervin Parikh MD [facility]   2/15/2022  2:00 PM Yasir Myers Jr., MD [facility]      Follow-up Information     Yasir Myers Jr, MD In 1 week.    Specialty: Family Medicine  Why: For follow up and review of hosptial course   Contact information:  [address] [place] [address]  [address]   [address] [place] [address]  [phone]                           I have seen and examined this patient face to face today. My clinical findings that support the need for the home health skilled services and home bound status are the following:  Weakness/numbness causing balance and gait disturbance due to Infection making it taxing to leave home.    Allergies:  Review of patient's allergies indicates:   Allergen Reactions    Contrast media Hives, Itching and Swelling    Epinephrine Anaphylaxis     Can cause  a  Carcinoid Crisis    Ibuprofen Hives, Itching and Swelling    Iodinated contrast media     Sulfa (sulfonamide antibiotics) Hives, Itching and Swelling       Diet: diabetic diet: 2000 calorie    Activities: activity as tolerated    Nursing:   SN to complete comprehensive assessment including routine vital signs. Instruct on disease process and s/s of complications to report to MD. Review/verify medication list sent home with the patient at time of discharge  and instruct patient/caregiver as needed. Frequency may be adjusted depending on start of care date.    Notify MD if SBP > 160 or < 90; DBP > 90 or < 50; HR > 120 or < 50; Temp > 101; Other:         CONSULTS:    Physical Therapy to evaluate and treat. Evaluate for home safety and equipment needs; Establish/upgrade home exercise program. Perform / instruct on therapeutic exercises, gait training, transfer training, and Range of Motion.  Occupational Therapy to evaluate and treat. Evaluate home environment for safety and equipment needs. Perform/Instruct on transfers, ADL training, ROM, and therapeutic exercises.   to evaluate for community resources/long-range planning.  Aide to provide assistance with personal care, ADLs, and vital signs.    MISCELLANEOUS CARE:  Routine Skin for Bedridden Patients: Instruct patient/caregiver to apply moisture barrier cream to all skin folds and wet areas in perineal area daily and after baths and all bowel movements.  Diabetic Care:   SN to perform and educate Diabetic management with blood glucose monitoring:, Fingerstick blood sugar AC and HS and Report CBG < 60 or > 350 to physician.    WOUND CARE ORDERS  no      Medications: Review discharge medications with patient and family and provide education.      Current Discharge Medication List      CONTINUE these medications which have NOT CHANGED    Details   atorvastatin (LIPITOR) 40 MG tablet Take 1 tablet (40 mg total) by mouth every evening.  Qty: 90 tablet,  Refills: 3      cyanocobalamin (VITAMIN B-12) 1000 MCG tablet Take 1 tablet (1,000 mcg total) by mouth once daily.  Qty: 30 tablet, Refills: 5    Associated Diagnoses: Low serum vitamin B12      losartan (COZAAR) 50 MG tablet Take 1 tablet (50 mg total) by mouth once daily.  Qty: 90 tablet, Refills: 3    Comments: .      magnesium oxide (MAG-OX) 400 mg (241.3 mg magnesium) tablet Take 1 tablet (400 mg total) by mouth 2 (two) times daily.  Qty: 60 tablet, Refills: 1      meclizine (ANTIVERT) 25 mg tablet TAKE 1 TABLET(25 MG) BY MOUTH THREE TIMES DAILY AS NEEDED FOR DIZZINESS  Qty: 30 tablet, Refills: 0      metoprolol tartrate (LOPRESSOR) 25 MG tablet Take 1 tablet (25 mg total) by mouth 2 (two) times daily.  Qty: 180 tablet, Refills: 0    Comments: NEW RX REQUEST FOR PATIENT DUE TO USING NEW MAIL ORDER PHARMACY[facility]  Associated Diagnoses: Resistant hypertension      oxyCODONE (ROXICODONE) 15 MG Tab Take 15 mg by mouth every 4 (four) hours as needed (chronic abdominal pain, malignancy related).      alcohol swabs (BD ALCOHOL SWABS) PadM Apply 1 each topically as needed.  Qty: 90 each, Refills: 0             I certify that this patient is confined to her home and needs intermittent skilled nursing care, physical therapy and occupational therapy.

## 2022-02-09 NOTE — NURSING
Pt reviewed AVS with bedside nurse and demonstrates understanding. IV removed. Pt tolerated well. Son at main entrance waiting for patient.

## 2022-02-09 NOTE — PLAN OF CARE
SW met with pt daquan to discuss dc planning.      Pt was accepted by [facility].  SW sent HH orders to [facility].     SW discussed PT/OT recommendations for [facility]. SW expressed that [facility] will have to be outsource and will not be covered under her insurance. Pt declined [facility].     Pt reported that her son will pick her up upon dc. Pt reported that she will have to wait until her son gets off of work.     SW will continue to follow pt throughout her transitions of care and assist with any dc needs.     Future Appointments   Date Time Provider Department Center   2/10/2022 10:20 AM Ervin Parikh MD [facility] UROLOG[facility]   2/15/2022  2:00 PM Yasir Myers Jr., MD [facility]        02/09/22 0983   Final Note   Assessment Type Final Discharge Note   Anticipated Discharge Disposition Home-Health   Post-Acute Status   Home Health Status Set-up Complete/Auth obtained   Discharge Delays None known at this time

## 2022-02-09 NOTE — PROGRESS NOTES
Health Maintenance Due   Topic Date Due    TETANUS VACCINE  Never done    Shingles Vaccine (1 of 2) Never done    Influenza Vaccine (1) 09/01/2021    COVID-19 Vaccine (2 - Pfizer 3-dose series) 09/04/2021    Mammogram  11/12/2021     Updates were requested from care everywhere.  Chart was reviewed for overdue [facility] Encounters (ANDREWS) topics (CRS, Breast Cancer Screening, Eye exam)  Health Maintenance has been updated.  LINKS immunization registry triggered.  Immunizations were reconciled.

## 2022-02-09 NOTE — ASSESSMENT & PLAN NOTE
Chronic, controlled.  Latest blood pressure and vitals reviewed-   Temp:  [97.3 °F (36.3 °C)-99.6 °F (37.6 °C)]   Pulse:  [60-71]   Resp:  [18-20]   BP: (114-178)/(60-79)   SpO2:  [94 %-100 %] .   Home meds for hypertension were reviewed and noted below.   Hypertension Medications             losartan (COZAAR) 50 MG tablet Take 1 tablet (50 mg total) by mouth once daily.    metoprolol tartrate (LOPRESSOR) 25 MG tablet Take 1 tablet (25 mg total) by mouth 2 (two) times daily.          While in the hospital, will manage blood pressure as follows; Continue home antihypertensive regimen    Will utilize p.r.n. blood pressure medication only if patient's blood pressure greater than  180/110 and she develops symptoms such as worsening chest pain or shortness of breath.

## 2022-02-09 NOTE — PLAN OF CARE
VN note: VN completed AVS and attachments and notified bedside nurse, Kelby. Will cont to be available and intervene prn.

## 2022-02-09 NOTE — ASSESSMENT & PLAN NOTE
Temp Readings from Last 3 Encounters:   02/09/22 98.6 °F (37 °C) (Oral)   01/08/22 97.9 °F (36.6 °C) (Oral)   12/29/21 97.9 °F (36.6 °C) (Oral)     Lab Results   Component Value Date    WBC 5.75 02/02/2022     No results for input(s): LACTATE in the last 72 hours.    Patient with positive nitrates although WBCs 13.   Urine culture with multi drug-resistant Klebsiella.    Cont 7 days of IV meropenem - Completed   Consultede Urology for recurrent UTI and hydronephrosis: Ok to d/c, follow up in office in 2-3 weeks with her urologist.    Antibiotics given-   Antibiotics (From admission, onward)            Start     Stop Route Frequency Ordered    02/08/22 1615  ertapenem (INVANZ) 1 g in sodium chloride 0.9% 100 mL IVPB         -- IV Every 24 hours (non-standard times) 02/08/22 1502        Cultures were taken-   Microbiology Results (last 7 days)     Procedure Component Value Units Date/Time    Urine Culture High Risk [[id number]]  (Abnormal)  (Susceptibility) Collected: 02/02/22 0915    Order Status: Completed Specimen: Urine, Clean Catch Updated: 02/06/22 1138     Urine Culture, Routine KLEBSIELLA PNEUMONIAE ESBL  >100,000 cfu/ml      Narrative:      Indicated criteria for high risk culture:->Prior to urologic  procedures

## 2022-02-11 PROCEDURE — G0180 MD CERTIFICATION HHA PATIENT: HCPCS | Mod: ,,, | Performed by: HOSPITALIST

## 2022-02-11 PROCEDURE — G0180 PR HOME HEALTH MD CERTIFICATION: ICD-10-PCS | Mod: ,,, | Performed by: HOSPITALIST

## 2022-02-14 NOTE — PROGRESS NOTES
Subjective:       Patient ID: Patito Domingo is a 71 y.o. female.    Chief Complaint: Urinary Tract Infection (Hospital follow up)     This is a 71 y.o.  female patient that is an established patient of mine. She is here to follow up after [facility] hospital admission.  Noted to have abd pain at the time, CT showed chronic bilateral lower pole stones and mild hydronephrosis.  Pain improved and hydroenephrosis resolved on CHARY.  Currently baseline abdominal pain.  Some vaginal irritation c/w yeast infection after antibiotic.  No dysuria, no hematuria.  No N/V/F/C.   Long h/o UTIs.  Seen by Dr. Marques in past and noted to have chronic lower pole stones and intervention was not recommended given would required PCNL and risks of this.  She has had stone treatment in past.       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)        Past Surgical History:   Procedure Laterality Date    ABDOMINAL SURGERY      CATARACT EXTRACTION Left 10/2017     SECTION      CHOLECYSTECTOMY      COLON SURGERY      cystoscope      CYSTOSCOPY W/ RETROGRADES Right 10/10/2019    Procedure: CYSTOSCOPY, WITH RETROGRADE PYELOGRAM;  Surgeon: Gen Isbell MD;  Location: [facility];  Service: Urology;  Laterality: Right;    ERCP N/A 2021    Procedure: ERCP (ENDOSCOPIC RETROGRADE CHOLANGIOPANCREATOGRAPHY);  Surgeon: Jayjay Rivera MD;  Location[address]);  Service: Endoscopy;  Laterality: N/A;    EYE SURGERY      HYSTERECTOMY  1996    LITHOTRIPSY      LIVER BIOPSY      carcinoid    URETEROSCOPY Right 10/10/2019    Procedure:  URETEROSCOPY;  Surgeon: Gen Isbell MD;  Location: [facility];  Service: Urology;  Laterality: Right;    UTERINE FIBROID SURGERY         Family History   Problem Relation Age of Onset    Cancer Mother         unknown    Alzheimer's disease Father     Stroke Sister     No Known Problems Son     No Known Problems Son     No Known Problems Son     No Known Problems Son     Kidney disease Neg Hx        Social History     Tobacco Use    Smoking status: Never Smoker    Smokeless tobacco: Never Used   Substance Use Topics    Alcohol use: No     Alcohol/week: 0.0 standard drinks    Drug use: No       Current Outpatient Medications on File Prior to Visit   Medication Sig Dispense Refill    alcohol swabs (BD ALCOHOL SWABS) PadM Apply 1 each topically as needed. 90 each 0    atorvastatin (LIPITOR) 40 MG tablet Take 1 tablet (40 mg total) by mouth every evening. 90 tablet 3    cyanocobalamin (VITAMIN B-12) 1000 MCG tablet Take 1 tablet (1,000 mcg total) by mouth once daily. 30 tablet 5    losartan (COZAAR) 100 MG tablet TAKE 1 TABLET (100 MG TOTAL) BY MOUTH ONCE DAILY. 90 tablet 1    losartan (COZAAR) 100 MG tablet Take 1 tablet (100 mg total) by mouth once daily. 90 tablet 0    losartan (COZAAR) 50 MG tablet Take 1 tablet (50 mg total) by mouth once daily. 90 tablet 3    magnesium oxide (MAG-OX) 400 mg (241.3 mg magnesium) tablet Take 1 tablet (400 mg total) by mouth 2 (two) times daily. 60 tablet 1    meclizine (ANTIVERT) 25 mg tablet TAKE 1 TABLET(25 MG) BY MOUTH THREE TIMES DAILY AS NEEDED FOR DIZZINESS 30 tablet 0    metoprolol tartrate (LOPRESSOR) 25 MG tablet TAKE 1 TABLET TWICE DAILY 180 tablet 0    oxyCODONE (ROXICODONE) 15 MG Tab Take 15 mg by mouth every 4 (four) hours as needed (chronic abdominal pain, malignancy related).       No current facility-administered medications on file prior to visit.       Review of patient's allergies indicates:   Allergen Reactions    Contrast media Hives,  Itching and Swelling    Epinephrine Anaphylaxis     Can cause  a Carcinoid Crisis    Ibuprofen Hives, Itching and Swelling    Iodinated contrast media     Sulfa (sulfonamide antibiotics) Hives, Itching and Swelling       Review of Systems   Constitutional: Negative for activity change, chills, fatigue and fever.   Respiratory: Negative for cough, chest tightness and shortness of breath.    Cardiovascular: Negative for chest pain.   Gastrointestinal: Positive for abdominal pain (chronic). Negative for abdominal distention, nausea and vomiting.   Genitourinary: Positive for vaginal pain. Negative for difficulty urinating, flank pain, hematuria and pelvic pain.   Musculoskeletal: Negative for back pain and gait problem.       Objective:      Physical Exam  HENT:      Head: Normocephalic.   Cardiovascular:      Pulses: Normal pulses.   Pulmonary:      Effort: Pulmonary effort is normal.   Abdominal:      General: Abdomen is flat. There is no distension.      Palpations: Abdomen is soft.      Tenderness: There is no abdominal tenderness. There is no right CVA tenderness, left CVA tenderness or guarding.   Musculoskeletal:      Cervical back: Normal range of motion.   Skin:     General: Skin is warm.   Neurological:      General: No focal deficit present.      Mental Status: She is alert.         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 Marques and did not recommend intervention given would need PCNL and risks)     Nephrolithiasis 12/1/2014    Stable chronic      Staghorn Calculus 10/15/2020    Chronic stable, follows with [facility] urology     Urinary Tract Infection Without Hematuria 4/28/2018       Plan:     1. Urine culture today  2. Follow up in 2-4 week with renal ultrasound to ensure hydronephrosis continues to be resolved  3. Do not recommend stone treatment at  this time given chronic nature of problem, has had for years.     Ervin Parikh MD

## 2022-02-15 ENCOUNTER — TELEPHONE (OUTPATIENT)
Dept: FAMILY MEDICINE | Facility: CLINIC | Age: 72
End: 2022-02-15
Payer: MEDICARE

## 2022-02-15 ENCOUNTER — OFFICE VISIT (OUTPATIENT)
Dept: UROLOGY | Facility: CLINIC | Age: 72
End: 2022-02-15
Payer: MEDICARE

## 2022-02-15 VITALS
HEIGHT: 66 IN | SYSTOLIC BLOOD PRESSURE: 155 MMHG | DIASTOLIC BLOOD PRESSURE: 67 MMHG | BODY MASS INDEX: 28.44 KG/M2 | WEIGHT: 176.94 LBS | HEART RATE: 105 BPM

## 2022-02-15 DIAGNOSIS — N39.0 URINARY TRACT INFECTION WITHOUT HEMATURIA, SITE UNSPECIFIED: Primary | ICD-10-CM

## 2022-02-15 DIAGNOSIS — N13.39 OTHER HYDRONEPHROSIS: ICD-10-CM

## 2022-02-15 LAB
BACTERIA #/AREA URNS AUTO: ABNORMAL /HPF
BILIRUB SERPL-MCNC: ABNORMAL MG/DL
BLOOD URINE, POC: ABNORMAL
CLARITY, POC UA: ABNORMAL
COLOR, POC UA: YELLOW
GLUCOSE UR QL STRIP: ABNORMAL
HYALINE CASTS UR QL AUTO: 0 /LPF
KETONES UR QL STRIP: ABNORMAL
LEUKOCYTE ESTERASE URINE, POC: ABNORMAL
MICROSCOPIC COMMENT: ABNORMAL
NITRITE, POC UA: ABNORMAL
PH, POC UA: 5
PROTEIN, POC: ABNORMAL
RBC #/AREA URNS AUTO: 7 /HPF (ref 0–4)
SPECIFIC GRAVITY, POC UA: 1.02
SQUAMOUS #/AREA URNS AUTO: 4 /HPF
URATE CRY UR QL COMP ASSIST: ABNORMAL
UROBILINOGEN, POC UA: ABNORMAL
WBC #/AREA URNS AUTO: 8 /HPF (ref 0–5)

## 2022-02-15 PROCEDURE — 1160F PR REVIEW ALL MEDS BY PRESCRIBER/CLIN PHARMACIST DOCUMENTED: ICD-10-PCS | Mod: HCNC,CPTII,S$GLB, | Performed by: UROLOGY

## 2022-02-15 PROCEDURE — 81002 URINALYSIS NONAUTO W/O SCOPE: CPT | Mod: HCNC,S$GLB,, | Performed by: UROLOGY

## 2022-02-15 PROCEDURE — 1125F AMNT PAIN NOTED PAIN PRSNT: CPT | Mod: HCNC,CPTII,S$GLB, | Performed by: UROLOGY

## 2022-02-15 PROCEDURE — 3077F SYST BP >= 140 MM HG: CPT | Mod: HCNC,CPTII,S$GLB, | Performed by: UROLOGY

## 2022-02-15 PROCEDURE — 3077F PR MOST RECENT SYSTOLIC BLOOD PRESSURE >= 140 MM HG: ICD-10-PCS | Mod: HCNC,CPTII,S$GLB, | Performed by: UROLOGY

## 2022-02-15 PROCEDURE — 99999 PR PBB SHADOW E&M-EST. PATIENT-LVL III: CPT | Mod: PBBFAC,HCNC,, | Performed by: UROLOGY

## 2022-02-15 PROCEDURE — 1160F RVW MEDS BY RX/DR IN RCRD: CPT | Mod: HCNC,CPTII,S$GLB, | Performed by: UROLOGY

## 2022-02-15 PROCEDURE — 99999 PR PBB SHADOW E&M-EST. PATIENT-LVL III: ICD-10-PCS | Mod: PBBFAC,HCNC,, | Performed by: UROLOGY

## 2022-02-15 PROCEDURE — 81002 POCT URINE DIPSTICK WITHOUT MICROSCOPE: ICD-10-PCS | Mod: HCNC,S$GLB,, | Performed by: UROLOGY

## 2022-02-15 PROCEDURE — 3008F BODY MASS INDEX DOCD: CPT | Mod: HCNC,CPTII,S$GLB, | Performed by: UROLOGY

## 2022-02-15 PROCEDURE — 4010F ACE/ARB THERAPY RXD/TAKEN: CPT | Mod: HCNC,CPTII,S$GLB, | Performed by: UROLOGY

## 2022-02-15 PROCEDURE — 81001 URINALYSIS AUTO W/SCOPE: CPT | Mod: HCNC | Performed by: UROLOGY

## 2022-02-15 PROCEDURE — 3008F PR BODY MASS INDEX (BMI) DOCUMENTED: ICD-10-PCS | Mod: HCNC,CPTII,S$GLB, | Performed by: UROLOGY

## 2022-02-15 PROCEDURE — 99213 OFFICE O/P EST LOW 20 MIN: CPT | Mod: HCNC,S$GLB,, | Performed by: UROLOGY

## 2022-02-15 PROCEDURE — 1159F MED LIST DOCD IN RCRD: CPT | Mod: HCNC,CPTII,S$GLB, | Performed by: UROLOGY

## 2022-02-15 PROCEDURE — 1125F PR PAIN SEVERITY QUANTIFIED, PAIN PRESENT: ICD-10-PCS | Mod: HCNC,CPTII,S$GLB, | Performed by: UROLOGY

## 2022-02-15 PROCEDURE — 1159F PR MEDICATION LIST DOCUMENTED IN MEDICAL RECORD: ICD-10-PCS | Mod: HCNC,CPTII,S$GLB, | Performed by: UROLOGY

## 2022-02-15 PROCEDURE — 99213 PR OFFICE/OUTPT VISIT, EST, LEVL III, 20-29 MIN: ICD-10-PCS | Mod: HCNC,S$GLB,, | Performed by: UROLOGY

## 2022-02-15 PROCEDURE — 1111F DSCHRG MED/CURRENT MED MERGE: CPT | Mod: HCNC,CPTII,S$GLB, | Performed by: UROLOGY

## 2022-02-15 PROCEDURE — 87086 URINE CULTURE/COLONY COUNT: CPT | Mod: HCNC | Performed by: UROLOGY

## 2022-02-15 PROCEDURE — 3078F PR MOST RECENT DIASTOLIC BLOOD PRESSURE < 80 MM HG: ICD-10-PCS | Mod: HCNC,CPTII,S$GLB, | Performed by: UROLOGY

## 2022-02-15 PROCEDURE — 3078F DIAST BP <80 MM HG: CPT | Mod: HCNC,CPTII,S$GLB, | Performed by: UROLOGY

## 2022-02-15 PROCEDURE — 4010F PR ACE/ARB THEARPY RXD/TAKEN: ICD-10-PCS | Mod: HCNC,CPTII,S$GLB, | Performed by: UROLOGY

## 2022-02-15 PROCEDURE — 1111F PR DISCHARGE MEDS RECONCILED W/ CURRENT OUTPATIENT MED LIST: ICD-10-PCS | Mod: HCNC,CPTII,S$GLB, | Performed by: UROLOGY

## 2022-02-15 RX ORDER — FLUCONAZOLE 150 MG/1
150 TABLET ORAL DAILY
Qty: 1 TABLET | Refills: 0 | Status: SHIPPED | OUTPATIENT
Start: 2022-02-15 | End: 2022-02-16

## 2022-02-15 RX ORDER — NEBULIZER AND COMPRESSOR
1 EACH MISCELLANEOUS DAILY
Qty: 1 EACH | Refills: 0 | Status: ON HOLD | COMMUNITY
Start: 2022-02-15 | End: 2022-05-18

## 2022-02-15 NOTE — TELEPHONE ENCOUNTER
----- Message from Yasir Myers Jr., MD sent at 2/15/2022 12:13 PM CST -----  Contact: Tejal dumont/[facility] [phone]  done  ----- Message -----  From: Heather Ryan MA  Sent: 2/11/2022  10:22 AM CST  To: Yasir Myers Jr., MD    Can you let me know when the orders are in?  ----- Message -----  From: Stephanie Diehl  Sent: 2/11/2022  10:18 AM CST  To: Louis Cooley Staff    Tejal dumont/[facility] calling to speak with you regarding patient complaining of a yeast infection she's requesting something be called in  and she's requesting a new blood pressure machine   Pharmacy: [id number] [place] [address] AT [facility] [address]   Phone:  [phone]  Fax:  [phone]

## 2022-02-16 LAB
BACTERIA UR CULT: NORMAL
BACTERIA UR CULT: NORMAL

## 2022-02-17 ENCOUNTER — TELEPHONE (OUTPATIENT)
Dept: FAMILY MEDICINE | Facility: CLINIC | Age: 72
End: 2022-02-17
Payer: MEDICARE

## 2022-02-17 NOTE — TELEPHONE ENCOUNTER
----- Message from Yasir Myers Jr., MD sent at 2/16/2022  4:05 PM CST -----  I sent her diflucan  ----- Message -----  From: Evelina Chan RN  Sent: 2/16/2022   1:24 PM CST  To: MD Shi Jorgensen Jr. Staff  Hello,   S: Yeast symptoms   B: Recent hospital visit 2/2/2022   A: Last ABO dose was Wednesday   R: Requesting medication to be sent to [facility] pharmacy     Please let me know if I can be of further assistance.     Respectfully,   DALTON Bustillos   Care Coordinator   Kei Vaughn

## 2022-02-18 ENCOUNTER — PATIENT OUTREACH (OUTPATIENT)
Dept: ADMINISTRATIVE | Facility: HOSPITAL | Age: 72
End: 2022-02-18
Payer: MEDICARE

## 2022-02-18 ENCOUNTER — TELEPHONE (OUTPATIENT)
Dept: FAMILY MEDICINE | Facility: CLINIC | Age: 72
End: 2022-02-18
Payer: MEDICARE

## 2022-02-18 NOTE — TELEPHONE ENCOUNTER
Spoke with pt, pt stated she was wanting results f her urine test for her UTI. Pt realised she called wrong providers office,

## 2022-02-18 NOTE — TELEPHONE ENCOUNTER
----- Message from Andre Queen sent at 2/18/2022  3:42 PM CST -----  Contact: pt.  .Type:  Needs Medical Advice    Who Called: pt  Would the patient rather a call back or a response via [facility]? Call back  Best Call Back Number: [phone]   Additional Information: Pt. Is requesting a call back from the office.

## 2022-02-21 ENCOUNTER — TELEPHONE (OUTPATIENT)
Dept: UROLOGY | Facility: CLINIC | Age: 72
End: 2022-02-21
Payer: MEDICARE

## 2022-02-21 ENCOUNTER — TELEPHONE (OUTPATIENT)
Dept: FAMILY MEDICINE | Facility: CLINIC | Age: 72
End: 2022-02-21
Payer: MEDICARE

## 2022-02-21 NOTE — TELEPHONE ENCOUNTER
Patient has left a message with primary MD in Dr Parikh's absence.  Informed of last week negative culture.  She will follow up with Dr Parikh as planned.

## 2022-02-21 NOTE — TELEPHONE ENCOUNTER
Spoke Terri, who is patient's therapist.  Complains of burning in vaginal area.  Informed of negative workup last week.  If new symptoms follow up with primary MD.  Dr Parikh is out of the office x 2 weeks.  She voiced understanding.

## 2022-02-21 NOTE — TELEPHONE ENCOUNTER
----- Message from Amy Perkins sent at 2/21/2022  2:45 PM CST -----  Regarding: call back  Contact: [phone]  Type:  Patient Returning Call    Who Called: PT   Who Left Message for Patient: Nurse   Does the patient know what this is regarding?: Medical advice   Would the patient rather a call back or a response via [facility]? Call back   Best Call Back Number: [phone]  Additional Information: burning sensation and pain lower back and urine has a odor

## 2022-02-21 NOTE — TELEPHONE ENCOUNTER
----- Message from Amy Perkins sent at 2/21/2022 12:36 PM CST -----  Regarding: Terri Hafsa [facility] [phone]  Contact: Terri Eagan [facility] [phone]  Who Called: Terri Eagan [facility] [phone]    Calling to talk to nurse in regards to patient is having symptoms of UTI and infection and would like to know what is the next step.

## 2022-02-21 NOTE — TELEPHONE ENCOUNTER
----- Message from Cecilia Moreno sent at 2/21/2022  3:14 PM CST -----  Type:  Sooner Apoointment Request    Caller is requesting a sooner appointment.  Caller declined first available appointment listed below.  Caller will not accept being placed on the waitlist and is requesting a message be sent to doctor.  Name of Caller:PT  When is the first available appointment? 03/09/22  Symptoms UTI pt said she needs to be seen ASAP  Would the patient rather a call back or a response via [facility]?please call  Best Call Back Number:[phone]  Additional Information:

## 2022-02-22 ENCOUNTER — TELEPHONE (OUTPATIENT)
Dept: FAMILY MEDICINE | Facility: CLINIC | Age: 72
End: 2022-02-22
Payer: MEDICARE

## 2022-02-22 DIAGNOSIS — N39.0 URINARY TRACT INFECTION WITHOUT HEMATURIA, SITE UNSPECIFIED: Primary | ICD-10-CM

## 2022-02-23 ENCOUNTER — TELEPHONE (OUTPATIENT)
Dept: FAMILY MEDICINE | Facility: CLINIC | Age: 72
End: 2022-02-23
Payer: MEDICARE

## 2022-02-23 RX ORDER — NITROFURANTOIN 25; 75 MG/1; MG/1
100 CAPSULE ORAL 2 TIMES DAILY
Qty: 10 CAPSULE | Refills: 0 | Status: SHIPPED | OUTPATIENT
Start: 2022-02-23 | End: 2022-03-01 | Stop reason: ALTCHOICE

## 2022-02-23 NOTE — TELEPHONE ENCOUNTER
----- Message from Yasir Myers Jr., MD sent at 2/23/2022 12:29 PM CST -----  Please let patient know her last urine culture showed sensitivity to macrobid.  I will call in the Rx to judi and await new culture

## 2022-02-28 ENCOUNTER — OUTPATIENT CASE MANAGEMENT (OUTPATIENT)
Dept: ADMINISTRATIVE | Facility: OTHER | Age: 72
End: 2022-02-28
Payer: MEDICARE

## 2022-02-28 ENCOUNTER — EXTERNAL CHRONIC CARE MANAGEMENT (OUTPATIENT)
Dept: PRIMARY CARE CLINIC | Facility: CLINIC | Age: 72
End: 2022-02-28
Payer: MEDICARE

## 2022-02-28 ENCOUNTER — TELEPHONE (OUTPATIENT)
Dept: CARDIOLOGY | Facility: CLINIC | Age: 72
End: 2022-02-28
Payer: MEDICARE

## 2022-02-28 PROCEDURE — 99490 CHRNC CARE MGMT STAFF 1ST 20: CPT | Mod: S$GLB,,, | Performed by: INTERNAL MEDICINE

## 2022-02-28 PROCEDURE — 99490 PR CHRONIC CARE MGMT, 1ST 20 MIN: ICD-10-PCS | Mod: S$GLB,,, | Performed by: INTERNAL MEDICINE

## 2022-02-28 NOTE — PROGRESS NOTES
Outpatient Care Management  Initial Patient Assessment    Patient: Patito Domingo  MRN: [id number]  Date of Service: 02/28/2022  Completed by: Marcie Thrasher RN  Referral Date: 02/10/2022  Program:     Reason for Visit   Patient presents with    OPCM Chart Review    OPCM Enrollment Call    Initial Assessment    Nursing Assessment    Plan Of Care       Brief Summary:  Patito Domingo was referred by in pt provider per June DHALIWAL for need for case management.   Patient qualifies for program based on recent hospitalization and risk score of 95.9.   Active problem list, medical, surgical and social history reviewed.   Active comorbidities include Recurrent Uti, hydronephrosis, kidney stones. Debility   Areas of need identified by patient include help with how to prevent future UTI.   Complex care plan created with patient/caregiver input. By next encounter, patient agrees to  and start .       Does pt have a hospital follow up scheduled with pcp or specialist as advised per discharging provider?  yes pt saw the urologist no new medications.  Home health nurse sent message to pcp for complaints of yeast infection from pt and when asked pt about the medications she indicated she did not  the medication because she was starting with uti.      [facility] pharmacy for  medications with no copay and OTC quartely allowance.  Pt is aware and currently using we discussed the moms meals and she consented to the post hospital meals.  Call made and ordered    Intervetnions  · Nursing screen, assessment and medication review completed today telephonically.  · Review of problem list and medical history   · Advised the after visit summary has most recent list of medications.   Please review for accuracy and notify PCP of any discrepancies.  Keep a copy after each md visit for your records  · Plan of care and  Short and long term goals developed with patient and verbalized understanding and agreement to these  goals  · Future appointment schedule reviewed in epic and discussed with the patient  · Reviewed [employer] otc and mail order pharmacy benefit  · Order placed for post discharge meals  · Welcome statement and contact information as well as [employer] information sent via pt mail.  · Admit notifications sent to pcp done[          Next steps  Best number to contact pt   [phone]  Best time 11 m  Did pt do what they agreed on at admit   antibiotics or the align probiotic  Follow up on receipt of education material sent in mail urinary tract infection discharge instructions  Fu on receipt of moms meals        Assessment Documentation     OPCM Initial Assessment    Involvement of Care  Identified Areas of Need  *Active medication list was reviewed and reconciled with patient and/or caregiver:           Problem List and History         Reviewed medical and social history with patient and/or caregiver. A complex care plan was discussed and completed today, with input from patient and/or caregiver.    Patient Instructions     Instructions were provided via the [facility] patient resources and are available for the patient to view on the patient portal, if active.    Todays OPCM Self-Management Care Plan was developed with the patients/caregivers input and was based on identified barriers from todays assessment.  Goals were written today with the patient/caregiver and the patient has agreed to work towards these goals to improve his/her overall well-being. Patient verbalized understanding of the care plan, goals, and all of today's instructions. Encouraged patient/caregiver to communicate with his/her physician and health care team about health conditions and the treatment plan.  Provided my contact information today and encouraged patient/caregiver to call me with any questions as needed.

## 2022-02-28 NOTE — LETTER
February 28, 2022    Patito Domingo  [address]  [place] [address]             [facility] [address]  [place] [address] Dear Rylan  Chayo:    Welcome to [facility] Complex Care Management Program.  It was a pleasure talking with you today.  My name is Marcie Thrasher and I look forward to being your Care Manager.  I am available Monday - Wednesday 8:00 am - 4:30 pm.   My goal is to help you function at the healthiest and highest level possible.  You can contact me directly at [phone].    As an [facility] patient with [employer], some of the services we may be able to provide include:      Development of an individualized care plan with a Registered Nurse    Connection with a    Connection with available resources and services     Coordinate communication among your care team members    Provide coaching and education    Help you understand your doctors treatment plan   Help you obtain information about your insurance coverage.     All services provided by [facility] Complex Care Managers and other care team members are coordinated with and communicated to your primary care team.    As part of your enrollment, you will be receiving education materials and more information about these services in your [facility] account, by phone or through the mail.  If you do not wish to participate or receive information, please contact our office at [phone].      Sincerely,        Marcie Thrasher RN, CCM [facility]   Out-patient RN Complex Care Manager   [phone] cell  [phone] office

## 2022-02-28 NOTE — TELEPHONE ENCOUNTER
----- Message from Marcie Thrasher RN sent at 2/28/2022  1:55 PM CST -----  2/28/2022    Your patient [id number] Domingo  was  referred to [facility] by Provider Referral.      My name is Marcie Thrasher RN, Care Manager.  At times, it may be necessary to have a  involved in the coordination of care, their names will be added to the Care Team where appropriate.    In our enrollment encounter, the following needs were identified:  Care Coordination and Disease Management Education    All needs and services provided by [facility] Complex [facility] Managers and other care team members will be communicated to you via your Resource Pool.      Our documentation can be readily accessed in the EMR using the following tabs: Chart review -> Episodes: High Risk.     It is our goal to provide holistic care, if at any time you feel other areas of concern need to be addressed, please communicate with me by Inbasket messaging.      Marcie Thrasher RN,[facility]  Outpatient Complex Case Management  [phone]

## 2022-03-01 ENCOUNTER — ANESTHESIA (OUTPATIENT)
Dept: MEDSURG UNIT | Facility: HOSPITAL | Age: 72
DRG: 445 | End: 2022-03-01
Payer: MEDICARE

## 2022-03-01 ENCOUNTER — HOSPITAL ENCOUNTER (INPATIENT)
Facility: HOSPITAL | Age: 72
LOS: 4 days | Discharge: HOME HEALTH | DRG: 445 | End: 2022-03-07
Attending: HOSPITALIST | Admitting: HOSPITALIST
Payer: MEDICARE

## 2022-03-01 ENCOUNTER — ANESTHESIA EVENT (OUTPATIENT)
Dept: MEDSURG UNIT | Facility: HOSPITAL | Age: 72
DRG: 445 | End: 2022-03-01
Payer: MEDICARE

## 2022-03-01 DIAGNOSIS — N12 PYELONEPHRITIS: ICD-10-CM

## 2022-03-01 DIAGNOSIS — C7A.095 MALIGNANT CARCINOID TUMOR OF MIDGUT: ICD-10-CM

## 2022-03-01 DIAGNOSIS — R10.9 ABDOMINAL PAIN: ICD-10-CM

## 2022-03-01 DIAGNOSIS — N30.00 ACUTE CYSTITIS WITHOUT HEMATURIA: Primary | ICD-10-CM

## 2022-03-01 DIAGNOSIS — K80.50 CHOLEDOCHOLITHIASIS: ICD-10-CM

## 2022-03-01 DIAGNOSIS — R07.9 CHEST PAIN: ICD-10-CM

## 2022-03-01 LAB
ALBUMIN SERPL BCP-MCNC: 3.6 G/DL (ref 3.5–5.2)
ALP SERPL-CCNC: 132 U/L (ref 55–135)
ALT SERPL W/O P-5'-P-CCNC: 8 U/L (ref 10–44)
ANION GAP SERPL CALC-SCNC: 9 MMOL/L (ref 8–16)
AST SERPL-CCNC: 12 U/L (ref 10–40)
BACTERIA #/AREA URNS HPF: ABNORMAL /HPF
BASOPHILS # BLD AUTO: 0.03 K/UL (ref 0–0.2)
BASOPHILS NFR BLD: 0.5 % (ref 0–1.9)
BILIRUB SERPL-MCNC: 0.5 MG/DL (ref 0.1–1)
BILIRUB UR QL STRIP: NEGATIVE
BUN SERPL-MCNC: 16 MG/DL (ref 8–23)
CALCIUM SERPL-MCNC: 9.3 MG/DL (ref 8.7–10.5)
CHLORIDE SERPL-SCNC: 107 MMOL/L (ref 95–110)
CLARITY UR: ABNORMAL
CO2 SERPL-SCNC: 21 MMOL/L (ref 23–29)
COLOR UR: YELLOW
CREAT SERPL-MCNC: 1 MG/DL (ref 0.5–1.4)
CTP QC/QA: YES
DIFFERENTIAL METHOD: ABNORMAL
EOSINOPHIL # BLD AUTO: 0.1 K/UL (ref 0–0.5)
EOSINOPHIL NFR BLD: 1.2 % (ref 0–8)
ERYTHROCYTE [DISTWIDTH] IN BLOOD BY AUTOMATED COUNT: 13.5 % (ref 11.5–14.5)
EST. GFR  (AFRICAN AMERICAN): >60 ML/MIN/1.73 M^2
EST. GFR  (NON AFRICAN AMERICAN): 58 ML/MIN/1.73 M^2
GLUCOSE SERPL-MCNC: 103 MG/DL (ref 70–110)
GLUCOSE UR QL STRIP: NEGATIVE
HCT VFR BLD AUTO: 34.3 % (ref 37–48.5)
HGB BLD-MCNC: 10.6 G/DL (ref 12–16)
HGB UR QL STRIP: ABNORMAL
IMM GRANULOCYTES # BLD AUTO: 0.02 K/UL (ref 0–0.04)
IMM GRANULOCYTES NFR BLD AUTO: 0.3 % (ref 0–0.5)
KETONES UR QL STRIP: NEGATIVE
LEUKOCYTE ESTERASE UR QL STRIP: ABNORMAL
LIPASE SERPL-CCNC: 12 U/L (ref 4–60)
LYMPHOCYTES # BLD AUTO: 1.3 K/UL (ref 1–4.8)
LYMPHOCYTES NFR BLD: 22.5 % (ref 18–48)
MCH RBC QN AUTO: 27.1 PG (ref 27–31)
MCHC RBC AUTO-ENTMCNC: 30.9 G/DL (ref 32–36)
MCV RBC AUTO: 88 FL (ref 82–98)
MICROSCOPIC COMMENT: ABNORMAL
MONOCYTES # BLD AUTO: 0.6 K/UL (ref 0.3–1)
MONOCYTES NFR BLD: 10.4 % (ref 4–15)
NEUTROPHILS # BLD AUTO: 3.8 K/UL (ref 1.8–7.7)
NEUTROPHILS NFR BLD: 65.1 % (ref 38–73)
NITRITE UR QL STRIP: POSITIVE
NRBC BLD-RTO: 0 /100 WBC
PH UR STRIP: 6 [PH] (ref 5–8)
PLATELET # BLD AUTO: 213 K/UL (ref 150–450)
PMV BLD AUTO: 10.4 FL (ref 9.2–12.9)
POTASSIUM SERPL-SCNC: 4.7 MMOL/L (ref 3.5–5.1)
PROT SERPL-MCNC: 7.3 G/DL (ref 6–8.4)
PROT UR QL STRIP: NEGATIVE
RBC # BLD AUTO: 3.91 M/UL (ref 4–5.4)
RBC #/AREA URNS HPF: 2 /HPF (ref 0–4)
SARS-COV-2 RDRP RESP QL NAA+PROBE: NEGATIVE
SODIUM SERPL-SCNC: 137 MMOL/L (ref 136–145)
SP GR UR STRIP: 1.01 (ref 1–1.03)
URATE CRY URNS QL MICRO: ABNORMAL
URN SPEC COLLECT METH UR: ABNORMAL
UROBILINOGEN UR STRIP-ACNC: NEGATIVE EU/DL
WBC # BLD AUTO: 5.87 K/UL (ref 3.9–12.7)
WBC #/AREA URNS HPF: 73 /HPF (ref 0–5)

## 2022-03-01 PROCEDURE — 87186 SC STD MICRODIL/AGAR DIL: CPT | Mod: HCNC | Performed by: EMERGENCY MEDICINE

## 2022-03-01 PROCEDURE — 63600175 PHARM REV CODE 636 W HCPCS: Mod: HCNC | Performed by: NURSE PRACTITIONER

## 2022-03-01 PROCEDURE — 87086 URINE CULTURE/COLONY COUNT: CPT | Mod: HCNC | Performed by: EMERGENCY MEDICINE

## 2022-03-01 PROCEDURE — 85025 COMPLETE CBC W/AUTO DIFF WBC: CPT | Mod: HCNC | Performed by: NURSE PRACTITIONER

## 2022-03-01 PROCEDURE — 83690 ASSAY OF LIPASE: CPT | Mod: HCNC | Performed by: NURSE PRACTITIONER

## 2022-03-01 PROCEDURE — 99285 EMERGENCY DEPT VISIT HI MDM: CPT | Mod: 25

## 2022-03-01 PROCEDURE — 25000003 PHARM REV CODE 250: Mod: HCNC | Performed by: NURSE PRACTITIONER

## 2022-03-01 PROCEDURE — 81000 URINALYSIS NONAUTO W/SCOPE: CPT | Mod: HCNC | Performed by: EMERGENCY MEDICINE

## 2022-03-01 PROCEDURE — 87077 CULTURE AEROBIC IDENTIFY: CPT | Mod: HCNC | Performed by: EMERGENCY MEDICINE

## 2022-03-01 PROCEDURE — 96376 TX/PRO/DX INJ SAME DRUG ADON: CPT

## 2022-03-01 PROCEDURE — G0378 HOSPITAL OBSERVATION PER HR: HCPCS | Mod: HCNC

## 2022-03-01 PROCEDURE — 96375 TX/PRO/DX INJ NEW DRUG ADDON: CPT

## 2022-03-01 PROCEDURE — 80053 COMPREHEN METABOLIC PANEL: CPT | Mod: HCNC | Performed by: NURSE PRACTITIONER

## 2022-03-01 PROCEDURE — P9612 CATHETERIZE FOR URINE SPEC: HCPCS

## 2022-03-01 PROCEDURE — 36000 PLACE NEEDLE IN VEIN: CPT | Performed by: STUDENT IN AN ORGANIZED HEALTH CARE EDUCATION/TRAINING PROGRAM

## 2022-03-01 PROCEDURE — U0002 COVID-19 LAB TEST NON-CDC: HCPCS | Mod: HCNC | Performed by: NURSE PRACTITIONER

## 2022-03-01 PROCEDURE — 87088 URINE BACTERIA CULTURE: CPT | Mod: HCNC | Performed by: EMERGENCY MEDICINE

## 2022-03-01 PROCEDURE — 96365 THER/PROPH/DIAG IV INF INIT: CPT

## 2022-03-01 RX ORDER — ACETAMINOPHEN 325 MG/1
650 TABLET ORAL EVERY 4 HOURS PRN
Status: DISCONTINUED | OUTPATIENT
Start: 2022-03-01 | End: 2022-03-07 | Stop reason: HOSPADM

## 2022-03-01 RX ORDER — NITROFURANTOIN 25; 75 MG/1; MG/1
100 CAPSULE ORAL EVERY 12 HOURS
Status: DISCONTINUED | OUTPATIENT
Start: 2022-03-01 | End: 2022-03-01

## 2022-03-01 RX ORDER — METOPROLOL TARTRATE 25 MG/1
25 TABLET, FILM COATED ORAL 2 TIMES DAILY
Status: DISCONTINUED | OUTPATIENT
Start: 2022-03-01 | End: 2022-03-07 | Stop reason: HOSPADM

## 2022-03-01 RX ORDER — LOSARTAN POTASSIUM 50 MG/1
100 TABLET ORAL DAILY
Status: DISCONTINUED | OUTPATIENT
Start: 2022-03-02 | End: 2022-03-07 | Stop reason: HOSPADM

## 2022-03-01 RX ORDER — HYDROMORPHONE HYDROCHLORIDE 1 MG/ML
1 INJECTION, SOLUTION INTRAMUSCULAR; INTRAVENOUS; SUBCUTANEOUS
Status: COMPLETED | OUTPATIENT
Start: 2022-03-01 | End: 2022-03-01

## 2022-03-01 RX ORDER — TALC
6 POWDER (GRAM) TOPICAL NIGHTLY PRN
Status: DISCONTINUED | OUTPATIENT
Start: 2022-03-01 | End: 2022-03-07 | Stop reason: HOSPADM

## 2022-03-01 RX ORDER — LANOLIN ALCOHOL/MO/W.PET/CERES
1000 CREAM (GRAM) TOPICAL DAILY
Status: DISCONTINUED | OUTPATIENT
Start: 2022-03-02 | End: 2022-03-07 | Stop reason: HOSPADM

## 2022-03-01 RX ORDER — ENOXAPARIN SODIUM 100 MG/ML
40 INJECTION SUBCUTANEOUS EVERY 24 HOURS
Status: DISCONTINUED | OUTPATIENT
Start: 2022-03-01 | End: 2022-03-01

## 2022-03-01 RX ORDER — NITROFURANTOIN 25; 75 MG/1; MG/1
100 CAPSULE ORAL ONCE
Status: COMPLETED | OUTPATIENT
Start: 2022-03-01 | End: 2022-03-01

## 2022-03-01 RX ORDER — HYDROMORPHONE HYDROCHLORIDE 1 MG/ML
0.5 INJECTION, SOLUTION INTRAMUSCULAR; INTRAVENOUS; SUBCUTANEOUS
Status: COMPLETED | OUTPATIENT
Start: 2022-03-01 | End: 2022-03-01

## 2022-03-01 RX ORDER — OXYCODONE HYDROCHLORIDE 5 MG/1
15 TABLET ORAL EVERY 4 HOURS PRN
Status: DISCONTINUED | OUTPATIENT
Start: 2022-03-01 | End: 2022-03-07 | Stop reason: HOSPADM

## 2022-03-01 RX ORDER — IBUPROFEN 200 MG
16 TABLET ORAL
Status: DISCONTINUED | OUTPATIENT
Start: 2022-03-01 | End: 2022-03-07 | Stop reason: HOSPADM

## 2022-03-01 RX ORDER — ONDANSETRON 4 MG/1
4 TABLET, ORALLY DISINTEGRATING ORAL
Status: COMPLETED | OUTPATIENT
Start: 2022-03-01 | End: 2022-03-01

## 2022-03-01 RX ORDER — ENOXAPARIN SODIUM 100 MG/ML
40 INJECTION SUBCUTANEOUS EVERY 24 HOURS
Status: DISCONTINUED | OUTPATIENT
Start: 2022-03-02 | End: 2022-03-07 | Stop reason: HOSPADM

## 2022-03-01 RX ORDER — IBUPROFEN 200 MG
24 TABLET ORAL
Status: DISCONTINUED | OUTPATIENT
Start: 2022-03-01 | End: 2022-03-07 | Stop reason: HOSPADM

## 2022-03-01 RX ORDER — ONDANSETRON 2 MG/ML
4 INJECTION INTRAMUSCULAR; INTRAVENOUS EVERY 8 HOURS PRN
Status: DISCONTINUED | OUTPATIENT
Start: 2022-03-01 | End: 2022-03-07 | Stop reason: HOSPADM

## 2022-03-01 RX ORDER — NALOXONE HCL 0.4 MG/ML
0.02 VIAL (ML) INJECTION
Status: DISCONTINUED | OUTPATIENT
Start: 2022-03-01 | End: 2022-03-07 | Stop reason: HOSPADM

## 2022-03-01 RX ORDER — OXYCODONE HYDROCHLORIDE 5 MG/1
15 TABLET ORAL
Status: COMPLETED | OUTPATIENT
Start: 2022-03-01 | End: 2022-03-01

## 2022-03-01 RX ORDER — SODIUM CHLORIDE 0.9 % (FLUSH) 0.9 %
10 SYRINGE (ML) INJECTION EVERY 12 HOURS PRN
Status: DISCONTINUED | OUTPATIENT
Start: 2022-03-01 | End: 2022-03-07 | Stop reason: HOSPADM

## 2022-03-01 RX ORDER — HYDRALAZINE HYDROCHLORIDE 20 MG/ML
10 INJECTION INTRAMUSCULAR; INTRAVENOUS
Status: COMPLETED | OUTPATIENT
Start: 2022-03-01 | End: 2022-03-01

## 2022-03-01 RX ORDER — ATORVASTATIN CALCIUM 40 MG/1
40 TABLET, FILM COATED ORAL NIGHTLY
Status: DISCONTINUED | OUTPATIENT
Start: 2022-03-01 | End: 2022-03-07 | Stop reason: HOSPADM

## 2022-03-01 RX ORDER — GLUCAGON 1 MG
1 KIT INJECTION
Status: DISCONTINUED | OUTPATIENT
Start: 2022-03-01 | End: 2022-03-07 | Stop reason: HOSPADM

## 2022-03-01 RX ADMIN — NITROFURANTOIN (MONOHYDRATE/MACROCRYSTALS) 100 MG: 75; 25 CAPSULE ORAL at 01:03

## 2022-03-01 RX ADMIN — METOPROLOL TARTRATE 25 MG: 25 TABLET, FILM COATED ORAL at 08:03

## 2022-03-01 RX ADMIN — HYDRALAZINE HYDROCHLORIDE 10 MG: 20 INJECTION, SOLUTION INTRAMUSCULAR; INTRAVENOUS at 02:03

## 2022-03-01 RX ADMIN — ATORVASTATIN CALCIUM 40 MG: 40 TABLET, FILM COATED ORAL at 08:03

## 2022-03-01 RX ADMIN — ONDANSETRON 4 MG: 4 TABLET, ORALLY DISINTEGRATING ORAL at 12:03

## 2022-03-01 RX ADMIN — HYDROMORPHONE HYDROCHLORIDE 1 MG: 1 INJECTION, SOLUTION INTRAMUSCULAR; INTRAVENOUS; SUBCUTANEOUS at 12:03

## 2022-03-01 RX ADMIN — MEROPENEM 1 G: 1 INJECTION, POWDER, FOR SOLUTION INTRAVENOUS at 04:03

## 2022-03-01 RX ADMIN — OXYCODONE 15 MG: 5 TABLET ORAL at 09:03

## 2022-03-01 RX ADMIN — OXYCODONE 15 MG: 5 TABLET ORAL at 04:03

## 2022-03-01 RX ADMIN — HYDROMORPHONE HYDROCHLORIDE 0.5 MG: 1 INJECTION, SOLUTION INTRAMUSCULAR; INTRAVENOUS; SUBCUTANEOUS at 02:03

## 2022-03-01 NOTE — ED TRIAGE NOTES
Pt complains of vaginal irritation, and pain with urination, pt reports frequent UTI's, not currently on antibiotics

## 2022-03-01 NOTE — ED PROVIDER NOTES
Encounter Date: 3/1/2022    SCRIBE #1 NOTE: I, Christopher Camara , am scribing for, and in the presence of, Lydia Mcdonald NP.       History     Chief Complaint   Patient presents with    Female  Problem     C/o vaginal irritation with possible UTI. Reports recurrent UTIs that have not cleared on antibiotics. On arrival, awake, alert. C/o low back pain.     Patito Domingo is a 71 y.o. 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    The patient presents to the ED due to Vaginal Irritation.   Patient reports being admitted to the hospital 3-4 weeks ago due to UTI and was admitted for IV antibiotics.  She reports continued dysuria so on 2/3 she underwent urine culture which showed Klebsiella sensitive to Macrobid.  She reports that her physician called her in Macrobid but she did not start the medication because I have that medicine at home and it does not work.  Over the past few days her abdominal pain has worsened.  She reports chronic abdominal pain but this is worse than usual.  Denies vomiting but has had a few episodes of nonbloody diarrhea.  No known hematuria.  She reports chills starting last night but is unsure if she had a fever.  No other complaints at this time.    The history is provided by the patient.     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     colon    Pyelonephritis, acute     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)      Past Surgical History:   Procedure Laterality Date    ABDOMINAL SURGERY      CATARACT EXTRACTION Left 10/2017     SECTION      CHOLECYSTECTOMY      COLON SURGERY      cystoscope      CYSTOSCOPY W/ RETROGRADES Right 10/10/2019    Procedure: CYSTOSCOPY, WITH RETROGRADE PYELOGRAM;  Surgeon: Gen Isbell MD;  Location: [facility];  Service: Urology;  Laterality: Right;    ERCP N/A 2021    Procedure: ERCP (ENDOSCOPIC RETROGRADE CHOLANGIOPANCREATOGRAPHY);  Surgeon: Jayjay Rivera MD;  Location: [facility] ENDO ([address] [place] [address]);  Service: Endoscopy;  Laterality: N/A;    EYE SURGERY      HYSTERECTOMY  1996    LITHOTRIPSY      LIVER BIOPSY      carcinoid    URETEROSCOPY Right 10/10/2019    Procedure: URETEROSCOPY;  Surgeon: Gen Isbell MD;  Location: [facility];  Service: Urology;  Laterality: Right;    UTERINE FIBROID SURGERY       Family History   Problem Relation Age of Onset    Cancer Mother         unknown    Alzheimer's disease Father     Stroke Sister     No Known Problems Son     No Known Problems Son     No Known Problems Son     No Known Problems Son     Kidney disease Neg Hx      Social History     Tobacco Use    Smoking status: Never Smoker    Smokeless tobacco: Never Used   Substance Use Topics    Alcohol use: No     Alcohol/week: 0.0 standard drinks    Drug use: No     Review of Systems   Constitutional: Positive for activity change, appetite change and chills.   HENT: Negative for congestion.    Respiratory: Negative for cough and shortness of breath.    Cardiovascular: Negative for chest pain.   Gastrointestinal: Positive for abdominal pain and diarrhea. Negative for nausea and vomiting.   Genitourinary: Positive  for dysuria and vaginal pain. Negative for pelvic pain.   Musculoskeletal: Positive for back pain.   Neurological: Negative for headaches.   All other systems reviewed and are negative.      Physical Exam     Initial Vitals [03/01/22 1105]   BP Pulse Resp Temp SpO2   (!) 158/86 72 16 98.2 °F (36.8 °C) 97 %      MAP       --         Physical Exam    Nursing note and vitals reviewed.  Constitutional: She appears well-developed and well-nourished. She is active and cooperative. She is easily aroused.  Non-toxic appearance. She does not have a sickly appearance. She does not appear ill. No distress.   HENT:   Head: Normocephalic and atraumatic.   Mouth/Throat: Oropharynx is clear and moist.   Eyes: Conjunctivae and EOM are normal. Pupils are equal, round, and reactive to light.   Neck: Neck supple.   Normal range of motion.  Cardiovascular: Normal rate, regular rhythm, normal heart sounds and intact distal pulses. Exam reveals no gallop and no friction rub.    No murmur heard.  Pulmonary/Chest: Effort normal and breath sounds normal.   Abdominal: Abdomen is soft. Bowel sounds are normal. She exhibits no distension. There is abdominal tenderness in the right lower quadrant, suprapubic area and left lower quadrant.   Genitourinary:    Pelvic exam was performed with patient supine.   There is no rash, tenderness, lesion or injury on the right labia. There is no rash, tenderness, lesion or injury on the left labia.    Genitourinary Comments: External Exam Normal  Super Pubic Bilateral Tenderness (Reported pain upon insertion of catheter)     Musculoskeletal:         General: No tenderness or edema. Normal range of motion.      Cervical back: Normal range of motion and neck supple.     Lymphadenopathy:     She has no cervical adenopathy.   Neurological: She is alert, oriented to person, place, and time and easily aroused. She has normal strength and normal reflexes.   Skin: Skin is warm, dry and intact. No bruising noted. No  erythema.   Psychiatric: She has a normal mood and affect. Her behavior is normal. Judgment and thought content normal.         ED Course   Procedures  Labs Reviewed   URINALYSIS, REFLEX TO URINE CULTURE - Abnormal; Notable for the following components:       Result Value    Appearance, UA Hazy (*)     Occult Blood UA Trace (*)     Nitrite, UA Positive (*)     Leukocytes, UA 3+ (*)     All other components within normal limits    Narrative:     Specimen Source->Urine   CBC W/ AUTO DIFFERENTIAL - Abnormal; Notable for the following components:    RBC 3.91 (*)     Hemoglobin 10.6 (*)     Hematocrit 34.3 (*)     MCHC 30.9 (*)     All other components within normal limits   COMPREHENSIVE METABOLIC PANEL - Abnormal; Notable for the following components:    CO2 21 (*)     ALT 8 (*)     eGFR if non  58 (*)     All other components within normal limits   URINALYSIS MICROSCOPIC - Abnormal; Notable for the following components:    WBC, UA 73 (*)     Bacteria Moderate (*)     All other components within normal limits    Narrative:     Specimen Source->Urine   CULTURE, URINE   LIPASE   LIPASE   SARS-COV-2 RDRP GENE    Narrative:     This test utilizes isothermal nucleic acid amplification   technology to detect the SARS-CoV-2 RdRp nucleic acid segment.   The analytical sensitivity (limit of detection) is 125 genome   equivalents/mL.   A POSITIVE result implies infection with the SARS-CoV-2 virus;   the patient is presumed to be contagious.     A NEGATIVE result means that SARS-CoV-2 nucleic acids are not   present above the limit of detection. A NEGATIVE result should be   treated as presumptive. It does not rule out the possibility of   COVID-19 and should not be the sole basis for treatment decisions.   If COVID-19 is strongly suspected based on clinical and exposure   history, re-testing using an alternate molecular assay should be   considered.   This test is only for use under the Food and Drug    Administration s Emergency Use Authorization (EUA).   Commercial kits are provided by [employer].   Performance characteristics of the EUA have been independently   verified by [facility] Department of   Pathology and Laboratory Medicine.   _________________________________________________________________   The authorized Fact Sheet for Healthcare Providers and the authorized Fact   Sheet for Patients of the ID NOW COVID-19 are available on the FDA   website:     https://www.fda.gov/media/378286/download  https://www.fda.gov/media/508207/download                 Imaging Results          CT Abdomen Pelvis  Without Contrast (Final result)  Result time 03/01/22 13:13:55    Final result by Ayden Cates MD (03/01/22 13:13:55)                 Impression:      1. CT findings concerning for choledocholithiasis with similar to slightly increased intra and extrahepatic bile duct dilatation.  Consider further characterization with MRCP.  2. Constellation of findings consistent with the patient's known metastatic carcinoid tumor including:  *Stable partially calcified mesenteric mass with surrounding lymphadenopathy.  *Multiple treated hepatic lesions.  3.  Bilateral nonobstructing renal stones with stone burden similar when compared to the previous CT.    4.  Multifocal areas of right renal cortical thinning likely representing scars.    5.  Postoperative changes of right hemicolectomy for presumed distal small bowel tumor resection.    6.  Low-attenuation pancreatic parenchymal lesions, possibly small cysts or side branch IPMNs but incompletely characterized on this exam.  Attention on follow-up MRCP recommended.    7.  Additional findings as detailed in the body of the report.      Electronically signed by: Ayden Cates MD  Date:    03/01/2022  Time:    13:13             Narrative:    EXAMINATION:  CT ABDOMEN PELVIS WITHOUT CONTRAST    CLINICAL HISTORY:  Abdominal pain, acute,  nonlocalized;    TECHNIQUE:  5 mm axial images were obtained through the abdomen and pelvis without IV contrast.  Coronal and sagittal reformats were performed.    COMPARISON:  CT 02/02/2022.    FINDINGS:  Visualized heart is normal.  No pericardial effusion.    There is stable patchy ground-glass attenuation and scattered subsegmental opacities within the visualized right lung and lingula.  No pleural effusion.    The liver is stable in size.  Low-attenuation lesions within the left lobe/segment 4 appears similar when compared to the recent CT.  Scattered high attenuation foci throughout the left lobe likely related to post treatment change.  There is persistent severe intrahepatic bile duct dilatation, similar to slightly progressed when compared to the previous exam.    Gallbladder is surgically absent.  There is persistent dilatation of the extrahepatic bile duct with a 0.8 cm high attenuation intraluminal focus concerning for a stone.    There is a small sliding-type hiatal hernia.  The stomach is otherwise decompressed and not well evaluated.    Duodenum, spleen and adrenal glands are normal.    There are low-attenuation lesions at the level of the pancreatic head and tail, incompletely evaluated on this exam but not significantly changed when compared to the previous CT.  No pancreatic ductal dilatation.  No peripancreatic inflammatory changes or drainable collections.    Kidneys are normal in size and location.  Right kidney demonstrates multifocal areas of cortical thinning likely related to parenchymal scarring.  Large staghorn calculus noted within the inferior pole of the right renal collecting system.  Additional bilateral nonobstructing renal stones with stone burden unchanged when compared to the previous CT.  No hydronephrosis or hydroureter.  Bladder is fluid filled and unremarkable.    Uterus is surgically absent.  Right ovary demonstrates no significant abnormalities.  The left ovary is not  definitely seen.  No pelvic free fluid.    Postoperative change of right hemicolectomy.  Residual colon demonstrates multiple scattered diverticuli.  Small bowel is normal in caliber.  No obstruction.    The abdominal aorta tapers normally without atherosclerotic calcification.    No ascites or intra-abdominal free air.    There is a mesenteric partially calcified soft tissue mass at the level of the right hemiabdomen corresponding with the patient's known carcinoid tumor.  Surrounding nodular soft tissue attenuation lesions likely represent enlarged, metastatic lymph nodes.    There is an increased number of slightly prominent retroperitoneal lymph nodes, not significantly changed when compared to the previous CT.  Nodular area of soft tissue attenuation within the right mesorectal fascia (series 2, image 138), similar when compared to the previous exam.    Surgical scar noted within the anterior midline abdominal wall with several small fat containing hernias nodular soft tissue attenuation and partially calcified foci noted within the subcutaneous soft tissues of the right anterior lower quadrant and left gluteus, likely related to previous injections.    There are degenerative changes of the spine.  No suspicious lytic or blastic lesions.                                 Medications   lorazepam (ATIVAN) injection 0.5 mg (0.5 mg Intravenous Not Given 3/1/22 1530)   meropenem-0.9% sodium chloride 1 g/50 mL IVPB (has no administration in time range)   HYDROmorphone injection 1 mg (1 mg Intravenous Given 3/1/22 1212)   ondansetron disintegrating tablet 4 mg (4 mg Oral Given 3/1/22 1212)   nitrofurantoin (macrocrystal-monohydrate) 100 MG capsule 100 mg (100 mg Oral Given 3/1/22 1345)   hydrALAZINE injection 10 mg (10 mg Intravenous Given 3/1/22 1409)   HYDROmorphone injection 0.5 mg (0.5 mg Intravenous Given 3/1/22 1455)   oxyCODONE immediate release tablet 15 mg (15 mg Oral Given 3/1/22 1623)     Medical Decision  Making:   History:   Old Medical Records: I decided to obtain old medical records.  Old Records Summarized: other records and records from previous admission(s).       <> Summary of Records: Urine culture on 02/03/2022 shows Klebsiella sensitive to Macrobid.    2/2/22:  Mild emphysematous changes of the lung parenchyma.     Dilatation of the biliary channels throughout the liver parenchyma which was seen previously on the examination obtained in November 2021.     Bilateral hydronephrosis and hydroureter however there is no obvious obstructing kidney stone seen within the ureter on either side.  The level of hydronephrosis and hydroureter has increased as compared to the previous examination.     Large calcifications involving the inferior collecting system of each kidney.     This report was flagged in Epic as abnormal.  Initial Assessment:   71-year-old female here for recurrent dysuria and lower abdominal pain.  Reports that she did not start the antibiotic that was prescribed by her primary doctor.  Patient is well-appearing.  Her abdomen is soft but there is lower abd tenderness on exam.   Differential Diagnosis:   UTI, kidney stone, chronic abd pain, electrolyte derangement, vaginitis, GERD, intestinal spasm, gastroenteritis, gastritis, ulcer, cholecystitis, cholelithiasis, gallstones, pancreatitis, ileus, small bowel obstruction, appendicitis, diverticulitis, colitis, constipation, intestinal gas pain.    Clinical Tests:   Lab Tests: Ordered and Reviewed  Radiological Study: Reviewed and Ordered  Medical Tests: Ordered and Reviewed  ED Management:  The patient was given dilaudid and zofran while in the ED.  UA, obtained by cath, reveals infection. Culture sent.  Previous culture shows susceptibility to Macrobid so pt was given PO macrobid while in the ED.  WBC normal. Renal function consistent with patient's usual values. LFTs WNL.     After dilaudid, pt still reports severe abd pain.     The patient became  anxious.  Even after dilaudid, pt very restless.  Ativan provided.        Discussed with , gen surgery resident, who will review the patient's results.      saw the patient in the ED.  She states that pt should be admitted for intractable abd pain and UTI  to medicine and they will consult on the patient. No additional imaging advised at this time.     Discussed with  who agrees to accept the patient.   Other:   I have discussed this case with another health care provider.       <> Summary of the Discussion: Discussed with  who agrees with ED course and disposition.   I, Lydia NG, personally performed the services described in this documentation. All medical record entries made by the scribe were at my direction and in my presence.  I have reviewed the chart and agree that the record reflects my personal performance and is accurate and complete.     BERENICE Prajapati-BC  7:54 PM 03/01/2022            Scribe Attestation:   Scribe #1: I performed the above scribed service and the documentation accurately describes the services I performed. I attest to the accuracy of the note.                 Clinical Impression:   Final diagnoses:  [R10.9] Abdominal pain  [N30.00] Acute cystitis without hematuria (Primary)          ED Disposition Condition    Observation               BERENICE Velazquez  03/01/22 1629       BERENICE Velazquez  03/01/22 1954

## 2022-03-01 NOTE — PHARMACY MED REC
"Admission Medication History     The home medication history was taken by Elsa Metzger CPhT.    Medication history obtained from,Patient verified    You may go to "Admission" then "Reconcile Home Medications" tabs to review and/or act upon these items.      The home medication list has been updated by the Pharmacy department.    Please read ALL comments highlighted in yellow.    Please address this information as you see fit.     Feel free to contact us if you have any questions or require assistance.      The medications listed below were removed from the home medication list.  Please reorder if appropriate:  Patient reports no longer taking the following medication(s):   macrobid 100mg            Elsa Metzger CPhT.  Ext [phone]                  .          "

## 2022-03-01 NOTE — Clinical Note
Diagnosis: Abdominal pain [501895]   Future Attending Provider: DANNY CEBALLOS [[id number]]   Admitting Provider:: DANNY CEBALLOS [[id number]]

## 2022-03-01 NOTE — CONSULTS
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 Patient reports abdominal pain, nausea, dysuria, foul smell to urine, and diarrhea that began last week. No known hematuria, fever, or other symptoms. She had been in touch with both her urologist and PCP about 1 week ago for these symptoms. A prescription was called in for macrobid because her last culture showed sensitivity to this however the patient states this antibiotic has not worked for her in the past therefore she did not take it.     In ED, she is hypertensive to the 190s. CT revealed similar intra and extra hepatic duct dilation with concern for a stone in an extrahepatic duct as well as non obstructing kidney stones BL. Bilirubin is 0.5, AST 12, ALT 8. WBC 6.8, lipase 12. UA nitrite positive with 3+ leukocytes and moderate bacteria.    Allergies:  Review of patient's allergies indicates:   Allergen Reactions    Contrast media Hives, Itching and Swelling    Epinephrine Anaphylaxis     Can cause  a Carcinoid Crisis    Ibuprofen Hives, Itching and Swelling    Iodinated contrast media     Sulfa (sulfonamide antibiotics) Hives, Itching and Swelling       Home Medications:  No current facility-administered medications on file prior to encounter.     Current Outpatient Medications on File Prior to Encounter   Medication Sig    atorvastatin (LIPITOR) 40 MG tablet Take 1 tablet (40 mg total) by mouth every evening.    cyanocobalamin (VITAMIN B-12) 1000 MCG tablet Take 1 tablet (1,000 mcg total) by mouth once daily.    losartan (COZAAR) 100 MG tablet Take 1 tablet (100 mg total) by mouth once daily.    magnesium oxide (MAG-OX) 400 mg (241.3 mg magnesium) tablet Take 1 tablet (400 mg total) by mouth 2 (two)  times daily.    meclizine (ANTIVERT) 25 mg tablet TAKE 1 TABLET(25 MG) BY MOUTH THREE TIMES DAILY AS NEEDED FOR DIZZINESS (Patient taking differently: Take 25 mg by mouth 3 (three) times daily as needed for Dizziness.)    metoprolol tartrate (LOPRESSOR) 25 MG tablet TAKE 1 TABLET TWICE DAILY (Patient taking differently: Take 25 mg by mouth 2 (two) times daily.)    oxyCODONE (ROXICODONE) 15 MG Tab Take 15 mg by mouth every 4 (four) hours as needed (chronic abdominal pain, malignancy related).    alcohol swabs (BD ALCOHOL SWABS) PadM Apply 1 each topically as needed.    BLOOD PRESSURE CUFF Misc 1 each by Misc.(Non-Drug; Combo Route) route once daily.    [DISCONTINUED] losartan (COZAAR) 100 MG tablet TAKE 1 TABLET (100 MG TOTAL) BY MOUTH ONCE DAILY.    [DISCONTINUED] losartan (COZAAR) 50 MG tablet Take 1 tablet (50 mg total) by mouth once daily.    [DISCONTINUED] nitrofurantoin, macrocrystal-monohydrate, (MACROBID) 100 MG capsule Take 1 capsule (100 mg total) by mouth 2 (two) times daily. 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)      Past Surgical History:   Procedure Laterality Date    ABDOMINAL SURGERY      CATARACT EXTRACTION Left 10/2017     SECTION      CHOLECYSTECTOMY      COLON SURGERY      cystoscope      CYSTOSCOPY W/ RETROGRADES Right 10/10/2019    Procedure: CYSTOSCOPY, WITH RETROGRADE PYELOGRAM;  Surgeon: Gen Isbell MD;  [facility];  Service: Urology;  Laterality: Right;    ERCP N/A 2021    Procedure: ERCP (ENDOSCOPIC RETROGRADE CHOLANGIOPANCREATOGRAPHY);  Surgeon: Jayjay Rivera MD;   [facility]  Service: Endoscopy;  Laterality: N/A;    EYE SURGERY      HYSTERECTOMY  5/1996    LITHOTRIPSY      LIVER BIOPSY  9/14    carcinoid    URETEROSCOPY Right 10/10/2019    Procedure: URETEROSCOPY;  Surgeon: Gen Isbell MD;  Location: [facility] OR;  Service: Urology;  Laterality: Right;    UTERINE FIBROID SURGERY       Family History   Problem Relation Age of Onset    Cancer Mother         unknown    Alzheimer's disease Father     Stroke Sister     No Known Problems Son     No Known Problems Son     No Known Problems Son     No Known Problems Son     Kidney disease Neg Hx      Social History     Tobacco Use    Smoking status: Never Smoker    Smokeless tobacco: Never Used   Substance Use Topics    Alcohol use: No     Alcohol/week: 0.0 standard drinks    Drug use: No        Review of Systems:  As Per HPI    OBJECTIVE:     Vital Signs:  Temp: 98.2 °F (36.8 °C) (03/01/22 1105)  Pulse: 86 (03/01/22 1610)  Resp: 18 (03/01/22 1455)  BP: (!) 179/76 (03/01/22 1603)  SpO2: 100 % (03/01/22 1610)    Physical Exam:  NAD  RR  No increased WOB, room air   Abdomen soft, non distended. Tender in mid upper and lower abdomen. Well healed midline and open leslie scars    Laboratory:  Labs Reviewed   URINALYSIS, REFLEX TO URINE CULTURE - Abnormal; Notable for the following components:       Result Value    Appearance, UA Hazy (*)     Occult Blood UA Trace (*)     Nitrite, UA Positive (*)     Leukocytes, UA 3+ (*)     All other components within normal limits    Narrative:     Specimen Source->Urine   CBC W/ AUTO DIFFERENTIAL - Abnormal; Notable for the following components:    RBC 3.91 (*)     Hemoglobin 10.6 (*)     Hematocrit 34.3 (*)     MCHC 30.9 (*)     All other components within normal limits   COMPREHENSIVE METABOLIC PANEL - Abnormal; Notable for the following components:    CO2 21 (*)     ALT 8 (*)     eGFR if non  58 (*)     All other components within normal limits    URINALYSIS MICROSCOPIC - Abnormal; Notable for the following components:    WBC, UA 73 (*)     Bacteria Moderate (*)     All other components within normal limits    Narrative:     Specimen Source->Urine   CULTURE, URINE   LIPASE   LIPASE   SARS-COV-2 RDRP GENE    Narrative:     This test utilizes isothermal nucleic acid amplification                   technology to detect the SARS-CoV-2 RdRp nucleic acid segment.                   The analytical sensitivity (limit of detection) is 125 genome                   equivalents/mL.                   A POSITIVE result implies infection with the SARS-CoV-2 virus;                   the patient is presumed to be contagious.                     A NEGATIVE result means that SARS-CoV-2 nucleic acids are not                   present above the limit of detection. A NEGATIVE result should be                   treated as presumptive. It does not rule out the possibility of                   COVID-19 and should not be the sole basis for treatment decisions.                   If COVID-19 is strongly suspected based on clinical and exposure                   history, re-testing using an alternate molecular assay should be                   considered.                   This test is only for use under the Food and Drug                   Administration s Emergency Use Authorization (EUA).                   Commercial kits are provided by [employer].                   Performance characteristics of the EUA have been independently                   verified by [facility] Department of                   Pathology and Laboratory Medicine.                   _________________________________________________________________                   The authorized Fact Sheet for Healthcare Providers and the authorized Fact                   Sheet for Patients of the ID NOW COVID-19 are available on the FDA                   website:                                      https://www.fda.gov/media/052270/download                  https://www.fda.gov/media/020373/download                                                              Diagnostic Results:  Imaging Results          CT Abdomen Pelvis  Without Contrast (Final result)  Result time 03/01/22 13:13:55    Final result by Ayden Cates MD (03/01/22 13:13:55)                 Impression:      1. CT findings concerning for choledocholithiasis with similar to slightly increased intra and extrahepatic bile duct dilatation.  Consider further characterization with MRCP.  2. Constellation of findings consistent with the patient's known metastatic carcinoid tumor including:  *Stable partially calcified mesenteric mass with surrounding lymphadenopathy.  *Multiple treated hepatic lesions.  3.  Bilateral nonobstructing renal stones with stone burden similar when compared to the previous CT.    4.  Multifocal areas of right renal cortical thinning likely representing scars.    5.  Postoperative changes of right hemicolectomy for presumed distal small bowel tumor resection.    6.  Low-attenuation pancreatic parenchymal lesions, possibly small cysts or side branch IPMNs but incompletely characterized on this exam.  Attention on follow-up MRCP recommended.    7.  Additional findings as detailed in the body of the report.      Electronically signed by: Ayden Cates MD  Date:    03/01/2022  Time:    13:13             Narrative:    EXAMINATION:  CT ABDOMEN PELVIS WITHOUT CONTRAST    CLINICAL HISTORY:  Abdominal pain, acute, nonlocalized;    TECHNIQUE:  5 mm axial images were obtained through the abdomen and pelvis without IV contrast.  Coronal and sagittal reformats were performed.    COMPARISON:  CT 02/02/2022.    FINDINGS:  Visualized heart is normal.  No pericardial effusion.    There is stable patchy ground-glass attenuation and scattered subsegmental opacities within the visualized right lung and lingula.  No pleural effusion.    The  liver is stable in size.  Low-attenuation lesions within the left lobe/segment 4 appears similar when compared to the recent CT.  Scattered high attenuation foci throughout the left lobe likely related to post treatment change.  There is persistent severe intrahepatic bile duct dilatation, similar to slightly progressed when compared to the previous exam.    Gallbladder is surgically absent.  There is persistent dilatation of the extrahepatic bile duct with a 0.8 cm high attenuation intraluminal focus concerning for a stone.    There is a small sliding-type hiatal hernia.  The stomach is otherwise decompressed and not well evaluated.    Duodenum, spleen and adrenal glands are normal.    There are low-attenuation lesions at the level of the pancreatic head and tail, incompletely evaluated on this exam but not significantly changed when compared to the previous CT.  No pancreatic ductal dilatation.  No peripancreatic inflammatory changes or drainable collections.    Kidneys are normal in size and location.  Right kidney demonstrates multifocal areas of cortical thinning likely related to parenchymal scarring.  Large staghorn calculus noted within the inferior pole of the right renal collecting system.  Additional bilateral nonobstructing renal stones with stone burden unchanged when compared to the previous CT.  No hydronephrosis or hydroureter.  Bladder is fluid filled and unremarkable.    Uterus is surgically absent.  Right ovary demonstrates no significant abnormalities.  The left ovary is not definitely seen.  No pelvic free fluid.    Postoperative change of right hemicolectomy.  Residual colon demonstrates multiple scattered diverticuli.  Small bowel is normal in caliber.  No obstruction.    The abdominal aorta tapers normally without atherosclerotic calcification.    No ascites or intra-abdominal free air.    There is a mesenteric partially calcified soft tissue mass at the level of the right hemiabdomen  corresponding with the patient's known carcinoid tumor.  Surrounding nodular soft tissue attenuation lesions likely represent enlarged, metastatic lymph nodes.    There is an increased number of slightly prominent retroperitoneal lymph nodes, not significantly changed when compared to the previous CT.  Nodular area of soft tissue attenuation within the right mesorectal fascia (series 2, image 138), similar when compared to the previous exam.    Surgical scar noted within the anterior midline abdominal wall with several small fat containing hernias nodular soft tissue attenuation and partially calcified foci noted within the subcutaneous soft tissues of the right anterior lower quadrant and left gluteus, likely related to previous injections.    There are degenerative changes of the spine.  No suspicious lytic or blastic lesions.                                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

## 2022-03-02 PROBLEM — N39.0 UTI (URINARY TRACT INFECTION): Status: RESOLVED | Noted: 2021-10-05 | Resolved: 2022-03-02

## 2022-03-02 PROBLEM — D3A.8 NEUROENDOCRINE TUMOR: Status: RESOLVED | Noted: 2022-03-02 | Resolved: 2022-03-02

## 2022-03-02 PROBLEM — I50.32 CHRONIC DIASTOLIC HEART FAILURE WITH PRESERVED EJECTION FRACTION: Status: ACTIVE | Noted: 2022-03-02

## 2022-03-02 PROBLEM — D3A.8 NEUROENDOCRINE TUMOR: Status: ACTIVE | Noted: 2022-03-02

## 2022-03-02 PROBLEM — K83.8 DILATION OF BILIARY TRACT: Status: ACTIVE | Noted: 2022-03-02

## 2022-03-02 LAB
ALBUMIN SERPL BCP-MCNC: 3.2 G/DL (ref 3.5–5.2)
ALP SERPL-CCNC: 115 U/L (ref 55–135)
ALT SERPL W/O P-5'-P-CCNC: 8 U/L (ref 10–44)
ANION GAP SERPL CALC-SCNC: 7 MMOL/L (ref 8–16)
AST SERPL-CCNC: 18 U/L (ref 10–40)
BASOPHILS # BLD AUTO: 0.04 K/UL (ref 0–0.2)
BASOPHILS NFR BLD: 0.8 % (ref 0–1.9)
BILIRUB DIRECT SERPL-MCNC: 0.3 MG/DL (ref 0.1–0.3)
BILIRUB SERPL-MCNC: 0.6 MG/DL (ref 0.1–1)
BUN SERPL-MCNC: 19 MG/DL (ref 8–23)
CALCIUM SERPL-MCNC: 9.4 MG/DL (ref 8.7–10.5)
CHLORIDE SERPL-SCNC: 106 MMOL/L (ref 95–110)
CO2 SERPL-SCNC: 26 MMOL/L (ref 23–29)
CREAT SERPL-MCNC: 1.2 MG/DL (ref 0.5–1.4)
DIFFERENTIAL METHOD: ABNORMAL
EOSINOPHIL # BLD AUTO: 0.1 K/UL (ref 0–0.5)
EOSINOPHIL NFR BLD: 2.1 % (ref 0–8)
ERYTHROCYTE [DISTWIDTH] IN BLOOD BY AUTOMATED COUNT: 13.7 % (ref 11.5–14.5)
EST. GFR  (AFRICAN AMERICAN): 53 ML/MIN/1.73 M^2
EST. GFR  (NON AFRICAN AMERICAN): 46 ML/MIN/1.73 M^2
GLUCOSE SERPL-MCNC: 102 MG/DL (ref 70–110)
HCT VFR BLD AUTO: 31.9 % (ref 37–48.5)
HGB BLD-MCNC: 9.7 G/DL (ref 12–16)
IMM GRANULOCYTES # BLD AUTO: 0.02 K/UL (ref 0–0.04)
IMM GRANULOCYTES NFR BLD AUTO: 0.4 % (ref 0–0.5)
INR PPP: 1 (ref 0.8–1.2)
LYMPHOCYTES # BLD AUTO: 1 K/UL (ref 1–4.8)
LYMPHOCYTES NFR BLD: 20.9 % (ref 18–48)
MAGNESIUM SERPL-MCNC: 1.9 MG/DL (ref 1.6–2.6)
MCH RBC QN AUTO: 26.7 PG (ref 27–31)
MCHC RBC AUTO-ENTMCNC: 30.4 G/DL (ref 32–36)
MCV RBC AUTO: 88 FL (ref 82–98)
MONOCYTES # BLD AUTO: 0.6 K/UL (ref 0.3–1)
MONOCYTES NFR BLD: 11.5 % (ref 4–15)
NEUTROPHILS # BLD AUTO: 3.1 K/UL (ref 1.8–7.7)
NEUTROPHILS NFR BLD: 64.3 % (ref 38–73)
NRBC BLD-RTO: 0 /100 WBC
PLATELET # BLD AUTO: 253 K/UL (ref 150–450)
PMV BLD AUTO: 10.9 FL (ref 9.2–12.9)
POTASSIUM SERPL-SCNC: 4.8 MMOL/L (ref 3.5–5.1)
PROT SERPL-MCNC: 7.1 G/DL (ref 6–8.4)
PROTHROMBIN TIME: 10.8 SEC (ref 9–12.5)
RBC # BLD AUTO: 3.63 M/UL (ref 4–5.4)
SODIUM SERPL-SCNC: 139 MMOL/L (ref 136–145)
WBC # BLD AUTO: 4.78 K/UL (ref 3.9–12.7)

## 2022-03-02 PROCEDURE — 83735 ASSAY OF MAGNESIUM: CPT | Mod: HCNC | Performed by: NURSE PRACTITIONER

## 2022-03-02 PROCEDURE — 63600175 PHARM REV CODE 636 W HCPCS: Mod: HCNC | Performed by: NURSE PRACTITIONER

## 2022-03-02 PROCEDURE — 99222 1ST HOSP IP/OBS MODERATE 55: CPT | Mod: NSCH,GC,, | Performed by: INTERNAL MEDICINE

## 2022-03-02 PROCEDURE — 97116 GAIT TRAINING THERAPY: CPT | Mod: HCNC

## 2022-03-02 PROCEDURE — 63600175 PHARM REV CODE 636 W HCPCS: Mod: HCNC | Performed by: HOSPITALIST

## 2022-03-02 PROCEDURE — G0378 HOSPITAL OBSERVATION PER HR: HCPCS | Mod: HCNC

## 2022-03-02 PROCEDURE — 36415 COLL VENOUS BLD VENIPUNCTURE: CPT | Mod: HCNC | Performed by: NURSE PRACTITIONER

## 2022-03-02 PROCEDURE — 99213 OFFICE O/P EST LOW 20 MIN: CPT | Mod: HCNC,GC,, | Performed by: INTERNAL MEDICINE

## 2022-03-02 PROCEDURE — 85610 PROTHROMBIN TIME: CPT | Mod: HCNC | Performed by: NURSE PRACTITIONER

## 2022-03-02 PROCEDURE — 25000003 PHARM REV CODE 250: Mod: HCNC | Performed by: HOSPITALIST

## 2022-03-02 PROCEDURE — 80076 HEPATIC FUNCTION PANEL: CPT | Mod: HCNC | Performed by: NURSE PRACTITIONER

## 2022-03-02 PROCEDURE — 80048 BASIC METABOLIC PNL TOTAL CA: CPT | Mod: HCNC | Performed by: NURSE PRACTITIONER

## 2022-03-02 PROCEDURE — 97161 PT EVAL LOW COMPLEX 20 MIN: CPT | Mod: HCNC

## 2022-03-02 PROCEDURE — 85025 COMPLETE CBC W/AUTO DIFF WBC: CPT | Mod: HCNC | Performed by: NURSE PRACTITIONER

## 2022-03-02 PROCEDURE — 94761 N-INVAS EAR/PLS OXIMETRY MLT: CPT | Mod: HCNC

## 2022-03-02 PROCEDURE — 99213 PR OFFICE/OUTPT VISIT, EST, LEVL III, 20-29 MIN: ICD-10-PCS | Mod: HCNC,GC,, | Performed by: INTERNAL MEDICINE

## 2022-03-02 PROCEDURE — 97165 OT EVAL LOW COMPLEX 30 MIN: CPT | Mod: HCNC

## 2022-03-02 PROCEDURE — 99222 PR INITIAL HOSPITAL CARE,LEVL II: ICD-10-PCS | Mod: NSCH,GC,, | Performed by: INTERNAL MEDICINE

## 2022-03-02 PROCEDURE — 25000003 PHARM REV CODE 250: Mod: HCNC | Performed by: NURSE PRACTITIONER

## 2022-03-02 RX ADMIN — METOPROLOL TARTRATE 25 MG: 25 TABLET, FILM COATED ORAL at 08:03

## 2022-03-02 RX ADMIN — MEROPENEM 1 G: 1 INJECTION, POWDER, FOR SOLUTION INTRAVENOUS at 09:03

## 2022-03-02 RX ADMIN — MEROPENEM 1 G: 1 INJECTION, POWDER, FOR SOLUTION INTRAVENOUS at 03:03

## 2022-03-02 RX ADMIN — OXYCODONE 15 MG: 5 TABLET ORAL at 10:03

## 2022-03-02 RX ADMIN — METOPROLOL TARTRATE 25 MG: 25 TABLET, FILM COATED ORAL at 09:03

## 2022-03-02 RX ADMIN — LOSARTAN POTASSIUM 100 MG: 50 TABLET, FILM COATED ORAL at 09:03

## 2022-03-02 RX ADMIN — CYANOCOBALAMIN TAB 1000 MCG 1000 MCG: 1000 TAB at 09:03

## 2022-03-02 RX ADMIN — OXYCODONE 15 MG: 5 TABLET ORAL at 03:03

## 2022-03-02 RX ADMIN — MEROPENEM 1 G: 1 INJECTION, POWDER, FOR SOLUTION INTRAVENOUS at 11:03

## 2022-03-02 RX ADMIN — OXYCODONE 15 MG: 5 TABLET ORAL at 08:03

## 2022-03-02 RX ADMIN — ATORVASTATIN CALCIUM 40 MG: 40 TABLET, FILM COATED ORAL at 08:03

## 2022-03-02 NOTE — SUBJECTIVE & OBJECTIVE
Interval History: VSS overnight. Plan for MRCP today. ID consulted for duration of IV antibiotics.     Review of Systems   Constitutional:  Negative for chills and fever.   HENT:  Negative for congestion, rhinorrhea, sore throat and trouble swallowing.    Eyes:  Negative for visual disturbance.   Respiratory:  Negative for cough, chest tightness and shortness of breath.    Cardiovascular:  Negative for chest pain, palpitations and leg swelling.   Gastrointestinal:  Positive for abdominal pain. Negative for constipation, diarrhea, nausea and vomiting.   Genitourinary:  Positive for dysuria. Negative for difficulty urinating and hematuria.   Skin:  Negative for color change and pallor.   Neurological:  Positive for weakness (chronic, denies acute changes). Negative for dizziness, syncope, facial asymmetry, speech difficulty, light-headedness and headaches.   Psychiatric/Behavioral:  Negative for agitation, confusion and decreased concentration. The patient is nervous/anxious.    Objective:     Vital Signs (Most Recent):  Temp: 98.1 °F (36.7 °C) (03/02/22 1114)  Pulse: 66 (03/02/22 1114)  Resp: 18 (03/02/22 1114)  BP: (!) 175/71 (03/02/22 1114)  SpO2: 98 % (03/02/22 0406)   Vital Signs (24h Range):  Temp:  [98 °F (36.7 °C)-98.9 °F (37.2 °C)] 98.1 °F (36.7 °C)  Pulse:  [60-94] 66  Resp:  [16-89] 18  SpO2:  [98 %-100 %] 98 %  BP: (134-213)/(61-92) 175/71     Weight: 82 kg (180 lb 12.4 oz)  Body mass index is 29.18 kg/m².    Intake/Output Summary (Last 24 hours) at 3/2/2022 1240  Last data filed at 3/2/2022 0500  Gross per 24 hour   Intake 100 ml   Output 300 ml   Net -200 ml      Physical Exam  Constitutional:       General: She is not in acute distress.     Appearance: Normal appearance. She is normal weight. She is not ill-appearing or toxic-appearing.   HENT:      Head: Normocephalic and atraumatic.      Mouth/Throat:      Mouth: Mucous membranes are moist.      Pharynx: Oropharynx is clear.   Eyes:      Extraocular  Movements: Extraocular movements intact.      Pupils: Pupils are equal, round, and reactive to light.   Cardiovascular:      Rate and Rhythm: Normal rate and regular rhythm.      Pulses: Normal pulses.      Heart sounds: Normal heart sounds. No murmur heard.  Pulmonary:      Effort: Pulmonary effort is normal. No respiratory distress.      Breath sounds: Normal breath sounds.   Abdominal:      General: Abdomen is flat. Bowel sounds are normal.      Palpations: Abdomen is soft.      Tenderness: There is abdominal tenderness in the right upper quadrant and suprapubic area. There is no rebound.   Musculoskeletal:      Right lower leg: No edema.      Left lower leg: No edema.   Skin:     General: Skin is warm and dry.   Neurological:      Mental Status: She is alert and oriented to person, place, and time. Mental status is at baseline.   Psychiatric:         Mood and Affect: Mood normal.         Behavior: Behavior normal.         Thought Content: Thought content normal.         Judgment: Judgment normal.       Significant Labs: All pertinent labs within the past 24 hours have been reviewed.  BMP:   Recent Labs   Lab 03/02/22  0614         K 4.8      CO2 26   BUN 19   CREATININE 1.2   CALCIUM 9.4   MG 1.9     CBC:   Recent Labs   Lab 03/01/22  1147 03/02/22  0614   WBC 5.87 4.78   HGB 10.6* 9.7*   HCT 34.3* 31.9*    253     CMP:   Recent Labs   Lab 03/01/22  1147 03/02/22  0614    139   K 4.7 4.8    106   CO2 21* 26    102   BUN 16 19   CREATININE 1.0 1.2   CALCIUM 9.3 9.4   PROT 7.3 7.1   ALBUMIN 3.6 3.2*   BILITOT 0.5 0.6   ALKPHOS 132 115   AST 12 18   ALT 8* 8*   ANIONGAP 9 7*   EGFRNONAA 58* 46*       Significant Imaging: I have reviewed all pertinent imaging results/findings within the past 24 hours.

## 2022-03-02 NOTE — HPI
"Patito Domingo is a 72 yo female with a pmh of cholecystectomy, colon cancer, liver tumor, CVA, HTN, frequent UTI. She presented with abdominal pain that started yesterday evening. She described right sided abdominal pain into the RUQ that is worse when she bends forward. She denies fevers but does report chills yesterday. She takes narcotics for chronic pain due to colon cancer but states this pain was different than normal. She reports having her gall bladder removed a few years ago. No change in appetite. She also reports vaginal irritation and dysuria which has been ongoing. She was admitted here earlier this month for a UTI, treated with merrem. She is followed by urology outpatient. She had a UA done on 2/23/22 due to urinary symptoms and found to be positive for UTI. She was prescribed macrobid but did not take it because 'it does not work". Today in the ED, she was found to have a UTI. CT abdomen with concern for choledocholithiasis with similar to slightly increased intra and extrahepatic bile duct dilatation. The patient was given a dose of macrobid in the ED and neuroendocrine was consulted per ED provider. She was also hypertensive on arrival and was given hydralazine IV. No leukocytosis or  elevated LFTs on arrival.   "

## 2022-03-02 NOTE — ANESTHESIA PROCEDURE NOTES
Peripheral IV Insertion    Diagnosis: I87.9 Disorder of vein, unspecified.    Patient location during procedure: floor  Procedure start time: 3/1/2022 10:26 PM  Timeout: 3/1/2022 10:25 PM  Procedure end time: 3/1/2022 10:30 PM    Staffing  Authorizing Provider: Brien Lux MD  Performing Provider: Brien Lux MD    Staffing  Other anesthesia staff: Corinne C. Weinstein, MD  Anesthesiologist was present at the time of the procedure.    Preanesthetic Checklist  Completed: patient identified, IV checked, site marked, risks and benefits discussed, surgical consent, monitors and equipment checked, pre-op evaluation, timeout performed and anesthesia consent givenPeripheral IV Insertion  Skin Prep: chlorhexidine gluconate and isopropyl alcohol  Local Infiltration: none  Orientation: left  Location: forearm  Catheter Type: peripheral IV (single lumen)  Catheter Size: 20 G  Catheter placement by Ultrasound guidance. Heme positive aspiration all ports.   Vessel Caliber: small, patent, compressibility normal  Vascular Doppler:  not doneInsertion Attempts: 1  Assessment  Dressing: secured with tape and tegaderm  Patient: Tolerated well  Line flushed easily.

## 2022-03-02 NOTE — PROGRESS NOTES
Pharmacist Renal Dose Adjustment Note    Patito Domingo is a 71 y.o. female being treated with the medication meropenem    Patient Data:    Vital Signs (Most Recent):  Temp: 98.1 °F (36.7 °C) (03/02/22 0745)  Pulse: 60 (03/02/22 0745)  Resp: 18 (03/02/22 1026)  BP: (!) 149/67 (03/02/22 0745)  SpO2: 98 % (03/02/22 0406)   Vital Signs (72h Range):  Temp:  [98 °F (36.7 °C)-98.9 °F (37.2 °C)]   Pulse:  [60-94]   Resp:  [16-89]   BP: (134-213)/(61-92)   SpO2:  [97 %-100 %]      Recent Labs   Lab 03/01/22  1147 03/02/22  0614   CREATININE 1.0 1.2     Serum creatinine: 1.2 mg/dL 03/02/22 0614  Estimated creatinine clearance: 47.8 mL/min    Medication:Meropenem dose: 1g frequency q 8 hours will be changed to medication:meropenem dose:1g frequency:q 12 hours    Pharmacist's Name: Mariela Allan  Pharmacist's Extension: [phone]

## 2022-03-02 NOTE — H&P
"[facility]  History & Physical    Patient Name: Patito Domingo  MRN: [id number]  Patient Class: OP- Observation  Admission Date: 3/1/2022  Attending Physician: Eleno Osborne, *   Primary Care Provider: Yasir Myers Jr, MD         Patient information was obtained from patient, past medical records and ER records.     Subjective:     Principal Problem:Choledocholithiasis    Chief Complaint:   Chief Complaint   Patient presents with    Female  Problem     C/o vaginal irritation with possible UTI. Reports recurrent UTIs that have not cleared on antibiotics. On arrival, awake, alert. C/o low back pain.        HPI: Patito Domingo is a 70 yo female with a pmh of cholecystectomy, colon cancer, liver tumor, CVA, HTN, frequent UTI. She presented with abdominal pain that started yesterday evening. She described right sided abdominal pain into the RUQ that is worse when she bends forward. She denies fevers but does report chills yesterday. She takes narcotics for chronic pain due to colon cancer but states this pain was different than normal. She reports having her gall bladder removed a few years ago. No change in appetite. She also reports vaginal irritation and dysuria which has been ongoing. She was admitted here earlier this month for a UTI, treated with merrem. She is followed by urology outpatient. She had a UA done on 2/23/22 due to urinary symptoms and found to be positive for UTI. She was prescribed macrobid but did not take it because 'it does not work". Today in the ED, she was found to have a UTI. CT abdomen with concern for choledocholithiasis with similar to slightly increased intra and extrahepatic bile duct dilatation. The patient was given a dose of macrobid in the ED and neuroendocrine was consulted per ED provider. She was also hypertensive on arrival and was given hydralazine IV. No leukocytosis or  elevated LFTs on arrival.       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)        Past Surgical History:   Procedure Laterality Date    ABDOMINAL SURGERY      CATARACT EXTRACTION Left 10/2017     SECTION      CHOLECYSTECTOMY      COLON SURGERY      cystoscope      CYSTOSCOPY W/ RETROGRADES Right 10/10/2019    Procedure: CYSTOSCOPY, WITH RETROGRADE PYELOGRAM;  Surgeon: Gen Isbell MD;  Location: [facility] OR;  Service: Urology;  Laterality: Right;    ERCP N/A 2021    Procedure: ERCP (ENDOSCOPIC RETROGRADE CHOLANGIOPANCREATOGRAPHY);  Surgeon: Jayjay Rivera MD;  Location: [address]);  Service: Endoscopy;  Laterality: N/A;    EYE SURGERY      HYSTERECTOMY  1996    LITHOTRIPSY      LIVER BIOPSY      carcinoid    URETEROSCOPY Right 10/10/2019    Procedure: URETEROSCOPY;  Surgeon: Gen Isbell MD;  Location: [facility] OR;  Service: Urology;  Laterality: Right;    UTERINE FIBROID SURGERY         Review of patient's allergies indicates:   Allergen Reactions    Contrast media Hives, Itching and Swelling    Epinephrine Anaphylaxis     Can cause  a Carcinoid Crisis    Ibuprofen Hives, Itching and Swelling    Iodinated contrast media     Sulfa (sulfonamide antibiotics) Hives, Itching and Swelling       No current facility-administered medications on file prior to encounter.     Current Outpatient Medications on File Prior to Encounter   Medication Sig    atorvastatin (LIPITOR) 40 MG tablet Take 1 tablet (40 mg total) by mouth every evening.    cyanocobalamin (VITAMIN B-12) 1000 MCG tablet Take 1 tablet (1,000 mcg total) by mouth once daily.    losartan (COZAAR) 100 MG tablet Take 1 tablet (100 mg total) by mouth once daily.     magnesium oxide (MAG-OX) 400 mg (241.3 mg magnesium) tablet Take 1 tablet (400 mg total) by mouth 2 (two) times daily.    meclizine (ANTIVERT) 25 mg tablet TAKE 1 TABLET(25 MG) BY MOUTH THREE TIMES DAILY AS NEEDED FOR DIZZINESS (Patient taking differently: Take 25 mg by mouth 3 (three) times daily as needed for Dizziness.)    metoprolol tartrate (LOPRESSOR) 25 MG tablet TAKE 1 TABLET TWICE DAILY (Patient taking differently: Take 25 mg by mouth 2 (two) times daily.)    oxyCODONE (ROXICODONE) 15 MG Tab Take 15 mg by mouth every 4 (four) hours as needed (chronic abdominal pain, malignancy related).    alcohol swabs (BD ALCOHOL SWABS) PadM Apply 1 each topically as needed.    BLOOD PRESSURE CUFF Misc 1 each by Misc.(Non-Drug; Combo Route) route once daily.    [DISCONTINUED] losartan (COZAAR) 100 MG tablet TAKE 1 TABLET (100 MG TOTAL) BY MOUTH ONCE DAILY.    [DISCONTINUED] losartan (COZAAR) 50 MG tablet Take 1 tablet (50 mg total) by mouth once daily.    [DISCONTINUED] nitrofurantoin, macrocrystal-monohydrate, (MACROBID) 100 MG capsule Take 1 capsule (100 mg total) by mouth 2 (two) times daily. for 5 days (Patient not taking: Reported on 2/28/2022)     Family History       Problem Relation (Age of Onset)    Alzheimer's disease Father    Cancer Mother    No Known Problems Son, Son, Son, Son    Stroke Sister          Tobacco Use    Smoking status: Never Smoker    Smokeless tobacco: Never Used   Substance and Sexual Activity    Alcohol use: No     Alcohol/week: 0.0 standard drinks    Drug use: No    Sexual activity: Not Currently     Review of Systems   Constitutional:  Positive for chills. Negative for appetite change and fever.   HENT:  Negative for congestion.    Eyes:  Negative for visual disturbance.   Respiratory:  Negative for cough and shortness of breath.    Cardiovascular:  Negative for chest pain.   Gastrointestinal:  Positive for abdominal pain. Negative for diarrhea, nausea and vomiting.    Genitourinary:  Positive for dysuria. Negative for pelvic pain.   Musculoskeletal:  Negative for back pain.   Skin:  Negative for wound.   Neurological:  Positive for weakness. Negative for headaches.   Psychiatric/Behavioral:  Negative for confusion.    Objective:     Vital Signs (Most Recent):  Temp: 98 °F (36.7 °C) (03/01/22 2017)  Pulse: 81 (03/01/22 2000)  Resp: 18 (03/01/22 1623)  BP: 135/61 (03/01/22 1902)  SpO2: 100 % (03/01/22 2000) Vital Signs (24h Range):  Temp:  [98 °F (36.7 °C)-98.4 °F (36.9 °C)] 98 °F (36.7 °C)  Pulse:  [69-94] 81  Resp:  [16-18] 18  SpO2:  [97 %-100 %] 100 %  BP: (135-213)/(61-92) 135/61        There is no height or weight on file to calculate BMI.    Physical Exam  Vitals and nursing note reviewed.   Constitutional:       General: She is not in acute distress.     Appearance: She is obese. She is not toxic-appearing.   HENT:      Head: Normocephalic and atraumatic.      Nose: Nose normal.      Mouth/Throat:      Mouth: Mucous membranes are moist.   Eyes:      Pupils: Pupils are equal, round, and reactive to light.   Cardiovascular:      Rate and Rhythm: Normal rate and regular rhythm.      Pulses: Normal pulses.      Heart sounds: Normal heart sounds.   Pulmonary:      Effort: Pulmonary effort is normal.      Breath sounds: Normal breath sounds.   Abdominal:      General: Bowel sounds are normal. There is no distension.      Palpations: Abdomen is soft.      Tenderness: There is abdominal tenderness.   Musculoskeletal:         General: Normal range of motion.      Cervical back: Normal range of motion.   Skin:     General: Skin is warm and dry.   Neurological:      Mental Status: She is alert and oriented to person, place, and time.      Motor: Weakness present.   Psychiatric:         Behavior: Behavior normal.         Thought Content: Thought content normal.         CRANIAL NERVES     CN III, IV, VI   Pupils are equal, round, and reactive to light.     Significant Labs: All  pertinent labs within the past 24 hours have been reviewed.    Significant Imaging: I have reviewed all pertinent imaging results/findings within the past 24 hours.    Assessment/Plan:     * Choledocholithiasis  CT abdomen concerning for choledocholithiasis with similar to slightly increased intra and extrahepatic bile duct dilatation  No fevers, normal WBC, normal LFTs  -surgery consulted per ED provider  -consult GI  -NPO past midnight      Impaired functional mobility, balance, gait, and endurance  Consult PT/OT      Anemia of chronic disease  stable      Essential hypertension  Hypertensive in ED, improved with hydralazine 10mg IV  Cont losartan and metoprolol        Chronic pain syndrome  Cancer patient  Cont home management with oxycodone        Urinary tract infection without hematuria  Recurrent UTI, followed by urology outpatient  Urine cultures pending  -start meropenem        VTE Risk Mitigation (From admission, onward)         Ordered     enoxaparin injection 40 mg  Daily         03/01/22 1635     IP VTE HIGH RISK PATIENT  Once         03/01/22 1635     Place sequential compression device  Until discontinued         03/01/22 1635                   Kaur Sánchez NP  Department of Hospital Medicine   [facility]

## 2022-03-02 NOTE — SUBJECTIVE & OBJECTIVE
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)        Past Surgical History:   Procedure Laterality Date    ABDOMINAL SURGERY      CATARACT EXTRACTION Left 10/2017     SECTION      CHOLECYSTECTOMY      COLON SURGERY      cystoscope      CYSTOSCOPY W/ RETROGRADES Right 10/10/2019    Procedure: CYSTOSCOPY, WITH RETROGRADE PYELOGRAM;  Surgeon: Gen Isbell MD;  Location: [facility] OR;  Service: Urology;  Laterality: Right;    ERCP N/A 2021    Procedure: ERCP (ENDOSCOPIC RETROGRADE CHOLANGIOPANCREATOGRAPHY);  Surgeon: Jayjay Rivera MD;  Location: [address]);  Service: Endoscopy;  Laterality: N/A;    EYE SURGERY      HYSTERECTOMY  1996    LITHOTRIPSY      LIVER BIOPSY      carcinoid    URETEROSCOPY Right 10/10/2019    Procedure: URETEROSCOPY;  Surgeon: Gen Isbell MD;  Location: [facility] OR;  Service: Urology;  Laterality: Right;    UTERINE FIBROID SURGERY         Review of patient's allergies indicates:   Allergen Reactions    Contrast media Hives, Itching and Swelling    Epinephrine Anaphylaxis     Can cause  a Carcinoid Crisis    Ibuprofen Hives, Itching and Swelling    Iodinated contrast media     Sulfa (sulfonamide antibiotics) Hives, Itching and Swelling       No current facility-administered medications on file prior to encounter.     Current Outpatient Medications on File Prior to Encounter   Medication Sig    atorvastatin (LIPITOR) 40 MG tablet Take 1 tablet (40 mg total) by mouth every evening.    cyanocobalamin (VITAMIN B-12) 1000 MCG tablet Take 1 tablet (1,000 mcg total) by mouth once daily.    losartan (COZAAR) 100 MG tablet Take 1 tablet (100 mg total) by mouth once daily.     magnesium oxide (MAG-OX) 400 mg (241.3 mg magnesium) tablet Take 1 tablet (400 mg total) by mouth 2 (two) times daily.    meclizine (ANTIVERT) 25 mg tablet TAKE 1 TABLET(25 MG) BY MOUTH THREE TIMES DAILY AS NEEDED FOR DIZZINESS (Patient taking differently: Take 25 mg by mouth 3 (three) times daily as needed for Dizziness.)    metoprolol tartrate (LOPRESSOR) 25 MG tablet TAKE 1 TABLET TWICE DAILY (Patient taking differently: Take 25 mg by mouth 2 (two) times daily.)    oxyCODONE (ROXICODONE) 15 MG Tab Take 15 mg by mouth every 4 (four) hours as needed (chronic abdominal pain, malignancy related).    alcohol swabs (BD ALCOHOL SWABS) PadM Apply 1 each topically as needed.    BLOOD PRESSURE CUFF Misc 1 each by Misc.(Non-Drug; Combo Route) route once daily.    [DISCONTINUED] losartan (COZAAR) 100 MG tablet TAKE 1 TABLET (100 MG TOTAL) BY MOUTH ONCE DAILY.    [DISCONTINUED] losartan (COZAAR) 50 MG tablet Take 1 tablet (50 mg total) by mouth once daily.    [DISCONTINUED] nitrofurantoin, macrocrystal-monohydrate, (MACROBID) 100 MG capsule Take 1 capsule (100 mg total) by mouth 2 (two) times daily. for 5 days (Patient not taking: Reported on 2/28/2022)     Family History       Problem Relation (Age of Onset)    Alzheimer's disease Father    Cancer Mother    No Known Problems Son, Son, Son, Son    Stroke Sister          Tobacco Use    Smoking status: Never Smoker    Smokeless tobacco: Never Used   Substance and Sexual Activity    Alcohol use: No     Alcohol/week: 0.0 standard drinks    Drug use: No    Sexual activity: Not Currently     Review of Systems   Constitutional:  Positive for chills. Negative for appetite change and fever.   HENT:  Negative for congestion.    Eyes:  Negative for visual disturbance.   Respiratory:  Negative for cough and shortness of breath.    Cardiovascular:  Negative for chest pain.   Gastrointestinal:  Positive for abdominal pain. Negative for diarrhea, nausea and vomiting.   Genitourinary:   Positive for dysuria. Negative for pelvic pain.   Musculoskeletal:  Negative for back pain.   Skin:  Negative for wound.   Neurological:  Positive for weakness. Negative for headaches.   Psychiatric/Behavioral:  Negative for confusion.    Objective:     Vital Signs (Most Recent):  Temp: 98 °F (36.7 °C) (03/01/22 2017)  Pulse: 81 (03/01/22 2000)  Resp: 18 (03/01/22 1623)  BP: 135/61 (03/01/22 1902)  SpO2: 100 % (03/01/22 2000) Vital Signs (24h Range):  Temp:  [98 °F (36.7 °C)-98.4 °F (36.9 °C)] 98 °F (36.7 °C)  Pulse:  [69-94] 81  Resp:  [16-18] 18  SpO2:  [97 %-100 %] 100 %  BP: (135-213)/(61-92) 135/61        There is no height or weight on file to calculate BMI.    Physical Exam  Vitals and nursing note reviewed.   Constitutional:       General: She is not in acute distress.     Appearance: She is obese. She is not toxic-appearing.   HENT:      Head: Normocephalic and atraumatic.      Nose: Nose normal.      Mouth/Throat:      Mouth: Mucous membranes are moist.   Eyes:      Pupils: Pupils are equal, round, and reactive to light.   Cardiovascular:      Rate and Rhythm: Normal rate and regular rhythm.      Pulses: Normal pulses.      Heart sounds: Normal heart sounds.   Pulmonary:      Effort: Pulmonary effort is normal.      Breath sounds: Normal breath sounds.   Abdominal:      General: Bowel sounds are normal. There is no distension.      Palpations: Abdomen is soft.      Tenderness: There is abdominal tenderness.   Musculoskeletal:         General: Normal range of motion.      Cervical back: Normal range of motion.   Skin:     General: Skin is warm and dry.   Neurological:      Mental Status: She is alert and oriented to person, place, and time.      Motor: Weakness present.   Psychiatric:         Behavior: Behavior normal.         Thought Content: Thought content normal.         CRANIAL NERVES     CN III, IV, VI   Pupils are equal, round, and reactive to light.     Significant Labs: All pertinent labs within  the past 24 hours have been reviewed.    Significant Imaging: I have reviewed all pertinent imaging results/findings within the past 24 hours.

## 2022-03-02 NOTE — ASSESSMENT & PLAN NOTE
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

## 2022-03-02 NOTE — HOSPITAL COURSE
Patient is 71 year old female admitted for choledocholithiasis and UTI. Started on IV meropenem. ID consulted and recommended to transition to cefepime and flagyl. GI consulted, MRCP and ERCP completed. General surgery and neuroendocrine following, no surgical intervention planned. Urine culture with >100,000 CFU of Hafnei Alvei that is pansensitive. Discharge home on 11 day course of Cipro and Flagyl. Hypertensive upon admission, home BP meds resumed with addition of amlodipine. Patient counseled on medication changes, and voiced understanding. Will follow up with PCP, neuroendocrinology, and urology as outpatient. Return precautions discussed, patient voiced understanding. All questions and concerns answered at this time.

## 2022-03-02 NOTE — PLAN OF CARE
TN rounded on pt - off floor for procedure   TN will meet with pt in am.      [place] [facility] [place]02-09/[place]     dx:  UTI - rec'd 14 days Meropenem while in hospital      pmh:  NET - liver  dx:  Choledocholithiasis     d/c'd with [facility] -   pt has a rollator, rw, bsc and ttb     son:  Zandra Ochoa [phone]     cx pending -- TN will continue to monitor pt status to determine needs such as IV abx.  During last admission SW documented pt's desire not to do home IV abx.          03/02/22 1730   Discharge Planning   Assessment Type Discharge Planning Brief Assessment

## 2022-03-02 NOTE — PLAN OF CARE
OT eval performed this date with PT. Pt with c/o 7-8/10 pain in R upper abdomen. Pt performed bed mobility with Sup. Pt performed functional mobility in room with Sup & use of rollator. Pt performed toileting with Sup and increased time for toilet t/f. Recommending  OT/PT upon d/c with family care/assistance. No further acute OT needs noted at this time. D/c OT.    Problem: Occupational Therapy Goal  Goal: Occupational Therapy Goal  Outcome: Adequate for Care Transition

## 2022-03-02 NOTE — ASSESSMENT & PLAN NOTE
Patient complaining of RUQ pain     CT noted worsening intra and extrahepatic bile duct dilatation in setting of normal LFTs with concern for choledocolithiais. Previous cholecystectomy, ERCP for choledocolithiasis 11/2021   Afebrile without leukocytosis and normal lipase    General surgery consulted, no surgical intervention. GI consulted, appreciate recommendations    - Per GI,  low threshold for AB coverage if febrile or clinical decompensation, clinically not consistent with cholangitis at this time  - Plan for MRCP today to evaluate need for ERCP  - Trend LFTs  - NPO for procedure

## 2022-03-02 NOTE — PROGRESS NOTES
03/01/22 2147   Admission   Initial VN Admission Questions Complete   Shift   Virtual Nurse - Patient Verbalized Approval Of Camera Use;VN Rounding   Safety/Activity   Patient Rounds bed in low position;call light in patient/parent reach;clutter free environment maintained;visualized patient;placement of personal items at bedside   Safety Promotion/Fall Prevention assistive device/personal item within reach;bed alarm set;Fall Risk reviewed with patient/family;side rails raised x 2   Positioning   Body Position supine   Head of Bed (HOB) Positioning HOB at 30-45 degrees   VN cued in to pt's room with permission. Admission questions completed. Plan of care reviewed with pt. Pt denies any questions or concerns at this time. Call bell w/in reach. Instructed to call for needs/assist oob.

## 2022-03-02 NOTE — PROGRESS NOTES
Neuroendocrine Surgery Progress Note  Admit Date: 3/1/2022  Hospital Day: 0  Procedure:   none    S:  Improved dysuria  Moderate pain this AM  Ambulating with walker to restroom  abx on board    O:  Vitals:   Temp:  [98 °F (36.7 °C)-98.9 °F (37.2 °C)]   Pulse:  [61-94]   Resp:  [16-89]   BP: (134-213)/(61-92)   SpO2:  [97 %-100 %]     Diet NPO   Intake/Output Summary (Last 24 hours) at 3/2/2022 0649  Last data filed at 3/2/2022 0500  Gross per 24 hour   Intake 100 ml   Output 300 ml   Net -200 ml            Physical Exam:  NAD  RR  No increased WOB, room air   Abdomen soft, non distended. Tender in mid right upper and lower abdomen. 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 No leukocytosis, elevated bilirubin or LFTs to suggest acute abdominal etiology     - continue following along for further insight into etiology of abdominal pain  - continue UTI management per primary team    Kalina Turner MD  [facility] HO-2

## 2022-03-02 NOTE — PT/OT/SLP EVAL
"Physical Therapy Evaluation and Treatment    Patient Name:  Patito Domingo   MRN:  [id number]    Recommendations:     Discharge Recommendations:  home health PT, home health OT   Discharge Equipment Recommendations: none   Barriers to Discharge: None    Assessment:     Patito Domingo is a 71 y.o. female admitted with a medical diagnosis of Choledocholithiasis.  She presents with the following impairments/functional limitations:  weakness, impaired endurance, impaired balance, decreased lower extremity function, pain, decreased ROM. Pt ambulated 30 ft with rollator at supervision level. Recommending return home with  PT/OT upon d/c.    Rehab Prognosis: Good; patient would benefit from acute skilled PT services to address these deficits and reach maximum level of function.    Recent Surgery: * No surgery found *      Plan:     During this hospitalization, patient to be seen 2 x/week to address the identified rehab impairments via gait training, therapeutic activities, therapeutic exercises and progress toward the following goals:    · Plan of Care Expires:  04/02/22    Subjective     Chief Complaint: abdominal pain  Patient/Family Comments/goals: pt reporting her L hamstring feels "tight" and is leading her to ambulate with more flexed posture  Pain/Comfort:  · Pain Rating 1: 7/10  · Location - Side 1: Right  · Location 1: abdomen  · Pain Addressed 1: Reposition, Distraction  · Pain Rating Post-Intervention 1: 8/10  · Pain Rating 2:  (reports she feels she has an ingrown toenail on her L great toe)    Patients cultural, spiritual, Jew conflicts given the current situation: no    Living Environment:  Pt lives alone in a 2nd floor apt with 1 flight of KESHAWN with B HR and tub/shower combo with shower chair and grab bars.  Prior to admission, patients level of function was mod I with rollator for mobility and ADLs besides requiring assist for bathing.  Equipment used at home: bedside commode, walker, rolling, " "rollator, wheelchair, shower chair, grab bar.  DME owned (not currently used): rolling walker. Upon discharge, patient will have assistance from .    Objective:     Communicated with nurse Sánchez prior to session. Patient found HOB elevated with bed alarm  upon PT entry to room.    General Precautions: Standard, contact, fall   Orthopedic Precautions:N/A   Braces: N/A  Respiratory Status: Room air    Exams:  · Gross Motor Coordination:  WFL  · Postural Exam:  Patient presented with the following abnormalities:    · -       Rounded shoulders  · -       Forward head  · -       FLexed trunk in standing  · Sensation:    · -       Intact  · Skin Integrity/Edema:      · -       Skin integrity: Visible skin intact  · RLE ROM: WFL  · RLE Strength: ankle DF WFLs, knee ext 3+/5, hip flexion 4/5  · LLE ROM: WFL  · LLE Strength: ankle/knee WFLs, hip flexion 4-/5    Functional Mobility:  · Bed Mobility:     · Scooting: supervision  · Supine to Sit: supervision  · Transfers:     · Sit to Stand:  supervision with pt's personal rollator  · Bed to Chair: supervision with  4 wheeled walker  using  Step Transfer  · Toilet Transfer: supervision with  4 wheeled walker  using  Step Transfer  · Gait: 30 ft, 20 ft x 2 with RW at supervision level. Ambulates with flexed posture over RW with reports of L hamstring feeling "tight"    Therapeutic Activities and Exercises:  Educated pt on role of PT and pt agreeable to participate in therapy session, requesting to only ambulate within hospital room.  Ambulated in room and completed toileting without assist.  Ambulated to sink, sat on rollator to complete hand hygiene, then ambulated to sit up in chair.  Pt demonstrated safe management and use of rollator.    AM-PAC 6 CLICK MOBILITY  Total Score:18     Patient left up in chair with all lines intact, call button in reach, chair alarm on and nurse notified.    GOALS:   Multidisciplinary Problems     Physical Therapy Goals        Problem: " Physical Therapy Goal    Goal Priority Disciplines Outcome Goal Variances Interventions   Physical Therapy Goal     PT, PT/OT Ongoing, Progressing     Description: Goals to be met by: 22     Patient will increase functional independence with mobility by performin. Supine <> sit with Modified Ethan  2. Sit to stand transfer with Modified Ethan  3. Bed to chair transfer with Modified Ethan   4. Gait  x 100 feet with Supervision   5. 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)        Past Surgical History:   Procedure Laterality Date    ABDOMINAL SURGERY      CATARACT EXTRACTION Left 10/2017     SECTION      CHOLECYSTECTOMY      COLON SURGERY      cystoscope      CYSTOSCOPY W/ RETROGRADES Right 10/10/2019    Procedure: CYSTOSCOPY, WITH RETROGRADE PYELOGRAM;  Surgeon: Gen Isbell MD;  Location: [facility];  Service: Urology;  Laterality: Right;    ERCP N/A 2021    Procedure: ERCP (ENDOSCOPIC RETROGRADE CHOLANGIOPANCREATOGRAPHY);  Surgeon: Jayjay Rivera MD;  Location: [address]);  Service: Endoscopy;  Laterality: N/A;    EYE SURGERY      HYSTERECTOMY  1996    LITHOTRIPSY      LIVER BIOPSY      carcinoid    URETEROSCOPY Right 10/10/2019    Procedure: URETEROSCOPY;  Surgeon: Gen Isbell MD;  Location: [facility];  Service: Urology;  Laterality: Right;    UTERINE FIBROID SURGERY         Time Tracking:     PT Received On: 22  PT Start Time: 854     PT Stop Time: 920  PT Total Time (min): 26 min     Billable Minutes:  Evaluation 15 and Gait Training 11      03/02/2022

## 2022-03-02 NOTE — SUBJECTIVE & OBJECTIVE
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)        Past Surgical History:   Procedure Laterality Date    ABDOMINAL SURGERY      CATARACT EXTRACTION Left 10/2017     SECTION      CHOLECYSTECTOMY      COLON SURGERY      cystoscope      CYSTOSCOPY W/ RETROGRADES Right 10/10/2019    Procedure: CYSTOSCOPY, WITH RETROGRADE PYELOGRAM;  Surgeon: Gen Isbell MD;  Location: [facility];  Service: Urology;  Laterality: Right;    ERCP N/A 2021    Procedure: ERCP (ENDOSCOPIC RETROGRADE CHOLANGIOPANCREATOGRAPHY);  Surgeon: Jayjay Rivera MD;  Location: [address]);  Service: Endoscopy;  Laterality: N/A;    EYE SURGERY      HYSTERECTOMY  1996    LITHOTRIPSY      LIVER BIOPSY      carcinoid    URETEROSCOPY Right 10/10/2019    Procedure: URETEROSCOPY;  Surgeon: Gen Isbell MD;  Location: [facility];  Service: Urology;  Laterality: Right;    UTERINE FIBROID SURGERY         Review of patient's allergies indicates:   Allergen Reactions    Contrast media Hives, Itching and Swelling    Epinephrine Anaphylaxis     Can cause  a Carcinoid Crisis    Ibuprofen Hives, Itching and Swelling    Iodinated contrast media     Sulfa (sulfonamide antibiotics) Hives, Itching and Swelling     Family History       Problem Relation (Age of Onset)    Alzheimer's disease Father    Cancer Mother    No Known Problems Son, Son, Son, Son    Stroke Sister          Tobacco Use    Smoking status: Never Smoker    Smokeless tobacco: Never Used   Substance and Sexual Activity    Alcohol use: No     Alcohol/week: 0.0 standard drinks    Drug use: No    Sexual activity: Not Currently     Review of Systems   Constitutional:  Negative for  activity change, appetite change, fatigue and fever.   HENT:  Negative for mouth sores and trouble swallowing.    Eyes:  Negative for photophobia and visual disturbance.   Respiratory:  Negative for cough, chest tightness and shortness of breath.    Cardiovascular:  Negative for chest pain and leg swelling.   Gastrointestinal:  Positive for abdominal pain. Negative for abdominal distention, anal bleeding, blood in stool, constipation, diarrhea, nausea, rectal pain and vomiting.   Endocrine: Negative for polydipsia and polyuria.   Genitourinary:  Positive for dysuria. Negative for difficulty urinating and vaginal bleeding.   Musculoskeletal:  Negative for arthralgias and back pain.   Skin:  Negative for color change and pallor.   Allergic/Immunologic: Negative for environmental allergies and food allergies.   Neurological:  Negative for dizziness, speech difficulty and weakness.   Hematological:  Negative for adenopathy. Does not bruise/bleed easily.   Psychiatric/Behavioral:  Negative for confusion, self-injury and suicidal ideas.    Objective:     Vital Signs (Most Recent):  Temp: 98.1 °F (36.7 °C) (03/02/22 0745)  Pulse: 60 (03/02/22 0745)  Resp: 18 (03/02/22 0745)  BP: (!) 149/67 (03/02/22 0745)  SpO2: 98 % (03/02/22 0406)   Vital Signs (24h Range):  Temp:  [98 °F (36.7 °C)-98.9 °F (37.2 °C)] 98.1 °F (36.7 °C)  Pulse:  [60-94] 60  Resp:  [16-89] 18  SpO2:  [97 %-100 %] 98 %  BP: (134-213)/(61-92) 149/67     Weight: 82 kg (180 lb 12.4 oz) (03/01/22 2315)  Body mass index is 29.18 kg/m².      Intake/Output Summary (Last 24 hours) at 3/2/2022 1018  Last data filed at 3/2/2022 0500  Gross per 24 hour   Intake 100 ml   Output 300 ml   Net -200 ml       Lines/Drains/Airways       Peripheral Intravenous Line  Duration                  Peripheral IV - Single Lumen 03/01/22 2233 20 G Anterior;Left Forearm <1 day                    Physical Exam  Constitutional:       General: She is not in acute distress.     Appearance:  She is ill-appearing.   HENT:      Head: Normocephalic and atraumatic.   Eyes:      General: No scleral icterus.     Conjunctiva/sclera: Conjunctivae normal.      Pupils: Pupils are equal, round, and reactive to light.   Cardiovascular:      Rate and Rhythm: Normal rate and regular rhythm.      Pulses: Normal pulses.      Heart sounds: Normal heart sounds.   Pulmonary:      Effort: No respiratory distress.      Breath sounds: No wheezing or rales.   Abdominal:      General: Bowel sounds are normal. There is no distension.      Palpations: Abdomen is soft.      Tenderness: There is no abdominal tenderness.      Comments: Healed surgical scars   Musculoskeletal:         General: No deformity. Normal range of motion.      Left lower leg: No edema.   Skin:     General: Skin is warm and dry.      Coloration: Skin is not jaundiced.   Neurological:      General: No focal deficit present.      Mental Status: She is alert and oriented to person, place, and time.      Cranial Nerves: No cranial nerve deficit.   Psychiatric:         Mood and Affect: Mood normal.         Thought Content: Thought content normal.         Judgment: Judgment normal.       Significant Labs:  CBC:   Recent Labs   Lab 03/01/22  1147 03/02/22  0614   WBC 5.87 4.78   HGB 10.6* 9.7*   HCT 34.3* 31.9*    253     CMP:   Recent Labs   Lab 03/01/22  1147 03/02/22  0614    102   CALCIUM 9.3 9.4   ALBUMIN 3.6  --    PROT 7.3  --     139   K 4.7 4.8   CO2 21* 26    106   BUN 16 19   CREATININE 1.0 1.2   ALKPHOS 132  --    ALT 8*  --    AST 12  --    BILITOT 0.5  --      Coagulation:   Recent Labs   Lab 03/02/22  0614   INR 1.0       Significant Imaging:  CT: I have reviewed all results within the past 24 hours and my personal findings are:  biliary dilation worsened from prior CT

## 2022-03-02 NOTE — HPI
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    She notes RUQ pain x 1 day, some chills at home but did not measure her temperature. She denies jaundice, dark urine, pale stools. She feels better this am. She additionally notes symptoms c/w recurrent UTI. CT in ER with worsening biliary dilation but normal LFTs on arrival. She had an ERCP with stone removal 11/2021.

## 2022-03-02 NOTE — H&P
[facility] Consult Note    Primary Attending Physician: Dr. Osborne  Consultant Attending: Dr. Vann  Consultant Resident: Haydee Montalov MD    Assessment/Plan:     UTI  History of recurrent UTI, previously admitted on 02/02/22 with klebsiella UTI and completed 14 day course of meropenem  - UA with pos nitrites, 3+ leukocyte esterase, moderate bacteria, and 73 WBC  - recommend to continue meropenem  - adjust antibiotics regimen pending urine cultures    Thank you for allowing us to participate in the care of this patient. Please contact me if you have any questions regarding this consult.    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 CT abdomen and pelvis obtained on admission with evidence of choledocholithiasis with intra and extra hepatic bile duct dilation. UA on admission consistent with UTI, started on meropenem as she has had history of ESBL UTI. Of note the patient follows with her PCP and was noted to have a UTI on 02/23/22 and prescribed macrobid however she did not fill this medication or take it because she notes that pills don't work for her UTIs. She denies fevers however she has intermittently felt hot. She reports that her urine has had a bad smell, and it has been dark colored, she has had dysuria and increased frequency.     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;  Service: Urology;  Laterality: Right;    ERCP N/A 2021    Procedure: ERCP (ENDOSCOPIC RETROGRADE CHOLANGIOPANCREATOGRAPHY);  Surgeon: Jayjay Rivera MD;  Location: [facility] ENDO ([facility] FLR);  Service: Endoscopy;  Laterality: N/A;    EYE SURGERY      HYSTERECTOMY  1996    LITHOTRIPSY      LIVER BIOPSY      carcinoid    URETEROSCOPY Right 10/10/2019    Procedure: URETEROSCOPY;  Surgeon: Gen Isbell MD;  Location: [facility];  Service: Urology;  Laterality: Right;    UTERINE FIBROID SURGERY         Allergies:  Review of patient's allergies indicates:   Allergen Reactions    Contrast media Hives, Itching and Swelling    Epinephrine Anaphylaxis     Can cause  a Carcinoid Crisis    Ibuprofen Hives, Itching and Swelling    Iodinated contrast media     Sulfa (sulfonamide antibiotics) Hives, Itching and Swelling       Medications:   Home Medications:  Prior to Admission medications    Medication Sig Start Date End Date Taking? Authorizing Provider   atorvastatin (LIPITOR) 40 MG tablet Take 1 tablet (40 mg total) by mouth every evening. 3/24/21 3/24/22 Yes Shirley Canas NP   cyanocobalamin (VITAMIN B-12) 1000 MCG tablet Take 1 tablet (1,000 mcg total) by mouth once daily. 19  Yes Pantera Rosen MD   losartan (COZAAR) 100 MG tablet Take 1 tablet (100 mg total) by mouth once daily. 2/10/22 5/11/22 Yes Yasir Myers Jr., MD   magnesium oxide (MAG-OX) 400 mg (241.3 mg magnesium) tablet Take 1 tablet (400 mg total) by  mouth 2 (two) times daily. 4/12/21  Yes Pallavi Sunkara, MD   meclizine (ANTIVERT) 25 mg tablet TAKE 1 TABLET(25 MG) BY MOUTH THREE TIMES DAILY AS NEEDED FOR DIZZINESS  Patient taking differently: Take 25 mg by mouth 3 (three) times daily as needed for Dizziness. 6/2/21  Yes Larisa Grant MD   metoprolol tartrate (LOPRESSOR) 25 MG tablet TAKE 1 TABLET TWICE DAILY  Patient taking differently: Take 25 mg by mouth 2 (two) times daily. 2/10/22  Yes Yasir Myers Jr., MD   oxyCODONE (ROXICODONE) 15 MG Tab Take 15 mg by mouth every 4 (four) hours as needed (chronic abdominal pain, malignancy related). 9/9/21  Yes Historical Provider   alcohol swabs (BD ALCOHOL SWABS) PadM Apply 1 each topically as needed. 11/3/21   Yasir Myers Jr., MD   BLOOD PRESSURE CUFF Misc 1 each by Misc.(Non-Drug; Combo Route) route once daily. 2/15/22   Yasir Myers Jr., MD       Family History:  Family History   Problem Relation Age of Onset    Cancer Mother         unknown    Alzheimer's disease Father     Stroke Sister     No Known Problems Son     No Known Problems Son     No Known Problems Son     No Known Problems Son     Kidney disease Neg Hx        Social History:  Social History     Tobacco Use    Smoking status: Never Smoker    Smokeless tobacco: Never Used   Substance Use Topics    Alcohol use: No     Alcohol/week: 0.0 standard drinks    Drug use: No       Review of Systems   Constitutional: Negative for chills, fever and malaise/fatigue.   Respiratory: Negative for cough, shortness of breath and wheezing.    Cardiovascular: Negative for chest pain, palpitations and leg swelling.   Gastrointestinal: Positive for abdominal pain (reports chronic abdominal pain). Negative for blood in stool, constipation, diarrhea, nausea and vomiting.   Genitourinary: Positive for dysuria, frequency and urgency.   Skin: Negative for rash.          Objective:   Last 24 Hour Vital Signs:  BP  Min: 134/91  Max: 193/62  Temp   "Av.3 °F (36.8 °C)  Min: 98 °F (36.7 °C)  Max: 98.9 °F (37.2 °C)  Pulse  Av.8  Min: 60  Max: 94  Resp  Av.6  Min: 16  Max: 89  SpO2  Av.3 %  Min: 98 %  Max: 100 %  Height  Av' 6" (167.6 cm)  Min: 5' 6" (167.6 cm)  Max: 5' 6" (167.6 cm)  Weight  Av kg (180 lb 12.4 oz)  Min: 82 kg (180 lb 12.4 oz)  Max: 82 kg (180 lb 12.4 oz)  I/O last 3 completed shifts:  In: 100 [IV Piggyback:100]  Out: 300 [Urine:300]    Physical Exam  Constitutional:       General: She is not in acute distress.     Appearance: Normal appearance. She is not ill-appearing.   HENT:      Head: Normocephalic and atraumatic.      Nose: Nose normal.      Mouth/Throat:      Mouth: Mucous membranes are moist.      Comments: Poor dentition  Eyes:      Extraocular Movements: Extraocular movements intact.      Conjunctiva/sclera: Conjunctivae normal.      Pupils: Pupils are equal, round, and reactive to light.   Cardiovascular:      Rate and Rhythm: Normal rate and regular rhythm.      Heart sounds: Normal heart sounds.   Pulmonary:      Effort: Pulmonary effort is normal.      Breath sounds: Wheezing (mild expiratory wheezing in bilateral lower lobes) present.   Abdominal:      General: Abdomen is flat. Bowel sounds are normal. There is no distension.      Palpations: Abdomen is soft.      Tenderness: There is abdominal tenderness (Tenderness to palpation in upper and lower R quadrants).   Musculoskeletal:         General: No swelling.      Cervical back: Normal range of motion and neck supple.   Skin:     General: Skin is warm and dry.      Capillary Refill: Capillary refill takes less than 2 seconds.   Neurological:      General: No focal deficit present.      Mental Status: She is alert and oriented to person, place, and time.         Laboratory Results:  Most Recent Data:  CBC:   Lab Results   Component Value Date    WBC 4.78 2022    HGB 9.7 (L) 2022    HCT 31.9 (L) 2022     2022    MCV 88 " 03/02/2022    RDW 13.7 03/02/2022     BMP:   Lab Results   Component Value Date     03/02/2022    K 4.8 03/02/2022     03/02/2022    CO2 26 03/02/2022    BUN 19 03/02/2022     03/02/2022    CALCIUM 9.4 03/02/2022    MG 1.9 03/02/2022    PHOS 3.5 12/10/2021     LFTs:   Lab Results   Component Value Date    PROT 7.1 03/02/2022    ALBUMIN 3.2 (L) 03/02/2022    BILITOT 0.6 03/02/2022    AST 18 03/02/2022    ALKPHOS 115 03/02/2022    ALT 8 (L) 03/02/2022     Coags:   Lab Results   Component Value Date    INR 1.0 03/02/2022     FLP:   Lab Results   Component Value Date    CHOL 137 03/08/2021    HDL 73 03/08/2021    LDLCALC 48.6 (L) 03/08/2021    TRIG 77 03/08/2021    CHOLHDL 53.3 (H) 03/08/2021     DM:   Lab Results   Component Value Date    HGBA1C 6.2 (H) 03/08/2021    HGBA1C 6.6 (H) 02/14/2020    HGBA1C 6.1 (H) 06/26/2019    LDLCALC 48.6 (L) 03/08/2021    CREATININE 1.2 03/02/2022     Thyroid:   Lab Results   Component Value Date    TSH 1.458 02/02/2022    FREET4 1.04 11/05/2021     Anemia:   Lab Results   Component Value Date    IRON 29 (L) 06/26/2019    TIBC 403 06/26/2019    FERRITIN 67 10/05/2021    DISVLLKW75 241 06/26/2019    FOLATE 8.2 06/12/2016     Cardiac:   Lab Results   Component Value Date    TROPONINI <0.006 02/02/2022    BNP 15 02/02/2022     Urinalysis:   Lab Results   Component Value Date    LABURIN  02/15/2022     Multiple organisms isolated. None in predominance.  Repeat if    LABURIN clinically necessary. 02/15/2022    COLORU Yellow 03/01/2022    CLARITYU Clear 06/15/2021    SPECGRAV 1.015 03/01/2022    NITRITE Positive (A) 03/01/2022    KETONESU Negative 03/01/2022    UROBILINOGEN Negative 03/01/2022       Trended Lab Data:  Recent Labs   Lab 03/01/22  1147 03/02/22  0614   WBC 5.87 4.78   HGB 10.6* 9.7*   HCT 34.3* 31.9*    253   MCV 88 88   RDW 13.5 13.7    139   K 4.7 4.8    106   CO2 21* 26   BUN 16 19    102   PROT 7.3 7.1   ALBUMIN 3.6 3.2*    BILITOT 0.5 0.6   AST 12 18   ALKPHOS 132 115   ALT 8* 8*         Microbiology Data:  Microbiology Results (last 7 days)     Procedure Component Value Units Date/Time    Urine culture [[id number]] Collected: 03/01/22 1116    Order Status: No result Specimen: Urine Updated: 03/01/22 1141          Antimicrobials:  Antibiotics (From admission, onward)            Start     Stop Route Frequency Ordered    03/02/22 2138  meropenem 1 g in sodium chloride 0.9 % 100 mL IVPB (ready to mix system)         -- IV Every 12 hours (non-standard times) 03/02/22 1037          Other Results:    Radiology:  X-Ray Chest 1 View    Result Date: 2/2/2022  EXAMINATION: XR CHEST 1 VIEW COMPARISON: Comparison is made to 11/20/2021, 11/09/2021, and 10/05/2021. FINDINGS: Even allowing for magnification of the cardiomediastinal silhouette related to projection, the heart appears minimally enlarged, though the cardiomediastinal silhouette demonstrates no detrimental change since the examinations referenced above.  Pulmonary vascularity is normal, and there are no findings indicating current cardiac decompensation.  Lung zones appear essentially clear, and are free of significant airspace consolidation or volume loss.  No pleural fluid.  No pneumothorax.     Mild cardiomegaly.  No significant detrimental interval change in the appearance of the chest since 11/20/2021. Electronically signed by: Roby Brice MD Date:    02/02/2022 Time:    06:25    US Retroperitoneal Complete    Result Date: 2/4/2022  EXAMINATION: US RETROPERITONEAL COMPLETE CLINICAL HISTORY: f/u hydronephrosis; TECHNIQUE: Ultrasound of the kidneys and urinary bladder was performed including color flow and Doppler evaluation of the kidneys. COMPARISON: CT abdomen pelvis dated 02/02/2022 FINDINGS: Right kidney measures 9.7 cm.  Resistive index of 0.74.  Renal calculi, largest at the lower pole measuring 1.7 cm.  No hydronephrosis. Left kidney: Measures 11.2 cm.  Resistive index of  0.76.  Calculus at the lower pole measuring 1.1 cm.  No hydronephrosis. Urinary bladder is partially fluid-filled at the time of scanning and otherwise unremarkable.     No hydronephrosis. Nonobstructing renal calculi. Electronically signed by: Rodolfo Jones Date:    02/04/2022 Time:    13:51    CT Abdomen Pelvis With Contrast    Result Date: 2/2/2022  EXAMINATION: CT ABDOMEN PELVIS WITH CONTRAST CLINICAL HISTORY: Abdominal pain, acute, nonlocalized;hx of carcinoid tumor; TECHNIQUE: Low dose axial images, sagittal and coronal reformations were obtained from the lung bases to the pubic symphysis following the IV administration of 75 mL of Omnipaque 350 COMPARISON: November 20, 2021 FINDINGS: The inferior aspect of the lung parenchyma shows mild interstitial scarring of the lung tissue.  The inferior aspect of the heart is unremarkable. Imaging below the diaphragm reveals that there is dilatation of the biliary channels within the liver.  The patient is status post cholecystectomy.  The common bile duct is dilated to approximately 11 mm.  There is a small area of calcification involving the inferior aspect of the left lobe of the liver.  The pancreas appears to be within normal limits.  No indication of dilatation of the pancreatic duct. The adrenal glands and spleen are within normal limits. Both kidneys feature hydronephrosis.  The right kidney features a large staghorn like calcification occupying the inferior collecting system.  Image number 81 of series 601 shows a calcification of 2.4 x 1.8 cm.  Image number 75 of series 601 and image number 91 of series 2 shows a calcification in the inferior collecting system on the left of 9.2 x 6.3 cm.  Evaluation of the more distal aspects of the ureters does not reveal evidence of a stone within the lumen of either and does not appear to be evidence of a stone within the lumen of the bladder. No indication of free air free fluid within the peritoneal cavity.  No  evidence of mesenteric or retroperitoneal lymphadenopathy. The stomach, small bowel and large bowel all appear to be within normal limits.  There is stool appreciated the large bowel.  The appendix is not readily identifiable in today's examination.  The patient is status post hysterectomy.  The rectum is unremarkable.     Mild emphysematous changes of the lung parenchyma. Dilatation of the biliary channels throughout the liver parenchyma which was seen previously on the examination obtained in November 2021. Bilateral hydronephrosis and hydroureter however there is no obvious obstructing kidney stone seen within the ureter on either side.  The level of hydronephrosis and hydroureter has increased as compared to the previous examination. Large calcifications involving the inferior collecting system of each kidney. This report was flagged in Epic as abnormal. Electronically signed by: Alfa Grant Date:    02/02/2022 Time:    09:17    CT Abdomen Pelvis  Without Contrast    Result Date: 3/1/2022  EXAMINATION: CT ABDOMEN PELVIS WITHOUT CONTRAST CLINICAL HISTORY: Abdominal pain, acute, nonlocalized; TECHNIQUE: 5 mm axial images were obtained through the abdomen and pelvis without IV contrast.  Coronal and sagittal reformats were performed. COMPARISON: CT 02/02/2022. FINDINGS: Visualized heart is normal.  No pericardial effusion. There is stable patchy ground-glass attenuation and scattered subsegmental opacities within the visualized right lung and lingula.  No pleural effusion. The liver is stable in size.  Low-attenuation lesions within the left lobe/segment 4 appears similar when compared to the recent CT.  Scattered high attenuation foci throughout the left lobe likely related to post treatment change.  There is persistent severe intrahepatic bile duct dilatation, similar to slightly progressed when compared to the previous exam. Gallbladder is surgically absent.  There is persistent dilatation of the extrahepatic  bile duct with a 0.8 cm high attenuation intraluminal focus concerning for a stone. There is a small sliding-type hiatal hernia.  The stomach is otherwise decompressed and not well evaluated. Duodenum, spleen and adrenal glands are normal. There are low-attenuation lesions at the level of the pancreatic head and tail, incompletely evaluated on this exam but not significantly changed when compared to the previous CT.  No pancreatic ductal dilatation.  No peripancreatic inflammatory changes or drainable collections. Kidneys are normal in size and location.  Right kidney demonstrates multifocal areas of cortical thinning likely related to parenchymal scarring.  Large staghorn calculus noted within the inferior pole of the right renal collecting system.  Additional bilateral nonobstructing renal stones with stone burden unchanged when compared to the previous CT.  No hydronephrosis or hydroureter.  Bladder is fluid filled and unremarkable. Uterus is surgically absent.  Right ovary demonstrates no significant abnormalities.  The left ovary is not definitely seen.  No pelvic free fluid. Postoperative change of right hemicolectomy.  Residual colon demonstrates multiple scattered diverticuli.  Small bowel is normal in caliber.  No obstruction. The abdominal aorta tapers normally without atherosclerotic calcification. No ascites or intra-abdominal free air. There is a mesenteric partially calcified soft tissue mass at the level of the right hemiabdomen corresponding with the patient's known carcinoid tumor.  Surrounding nodular soft tissue attenuation lesions likely represent enlarged, metastatic lymph nodes. There is an increased number of slightly prominent retroperitoneal lymph nodes, not significantly changed when compared to the previous CT.  Nodular area of soft tissue attenuation within the right mesorectal fascia (series 2, image 138), similar when compared to the previous exam. Surgical scar noted within the  anterior midline abdominal wall with several small fat containing hernias nodular soft tissue attenuation and partially calcified foci noted within the subcutaneous soft tissues of the right anterior lower quadrant and left gluteus, likely related to previous injections. There are degenerative changes of the spine.  No suspicious lytic or blastic lesions.     1. CT findings concerning for choledocholithiasis with similar to slightly increased intra and extrahepatic bile duct dilatation.  Consider further characterization with MRCP. 2. Constellation of findings consistent with the patient's known metastatic carcinoid tumor including: *Stable partially calcified mesenteric mass with surrounding lymphadenopathy. *Multiple treated hepatic lesions. 3.  Bilateral nonobstructing renal stones with stone burden similar when compared to the previous CT. 4.  Multifocal areas of right renal cortical thinning likely representing scars. 5.  Postoperative changes of right hemicolectomy for presumed distal small bowel tumor resection. 6.  Low-attenuation pancreatic parenchymal lesions, possibly small cysts or side branch IPMNs but incompletely characterized on this exam.  Attention on follow-up MRCP recommended. 7.  Additional findings as detailed in the body of the report. Electronically signed by: Ayden Cates MD Date:    03/01/2022 Time:    13:13

## 2022-03-02 NOTE — ASSESSMENT & PLAN NOTE
History of UTIs. Prior cultures positive for klebsiella as well as multidrug resistant bacteria.    - Urine culture pending   - Continue IV meropenem   - ID consulted, appreciate recs

## 2022-03-02 NOTE — ASSESSMENT & PLAN NOTE
CT abdomen concerning for choledocholithiasis with similar to slightly increased intra and extrahepatic bile duct dilatation  No fevers, normal WBC, normal LFTs  -surgery consulted per ED provider  -consult GI  -NPO past midnight

## 2022-03-02 NOTE — PROGRESS NOTES
"[facility] Medicine  Progress Note    Patient Name: Patito Domingo  MRN: [id number]  Patient Class: OP- Observation   Admission Date: 3/1/2022  Length of Stay: 0 days  Attending Physician: Eleno Osborne, *  Primary Care Provider: Yasir Myers Jr, MD        Subjective:     Principal Problem:Choledocholithiasis        HPI:  Patito Domingo is a 70 yo female with a pmh of cholecystectomy, colon cancer, liver tumor, CVA, HTN, frequent UTI. She presented with abdominal pain that started yesterday evening. She described right sided abdominal pain into the RUQ that is worse when she bends forward. She denies fevers but does report chills yesterday. She takes narcotics for chronic pain due to colon cancer but states this pain was different than normal. She reports having her gall bladder removed a few years ago. No change in appetite. She also reports vaginal irritation and dysuria which has been ongoing. She was admitted here earlier this month for a UTI, treated with merrem. She is followed by urology outpatient. She had a UA done on 2/23/22 due to urinary symptoms and found to be positive for UTI. She was prescribed macrobid but did not take it because 'it does not work". Today in the ED, she was found to have a UTI. CT abdomen with concern for choledocholithiasis with similar to slightly increased intra and extrahepatic bile duct dilatation. The patient was given a dose of macrobid in the ED and neuroendocrine was consulted per ED provider. She was also hypertensive on arrival and was given hydralazine IV. No leukocytosis or  elevated LFTs on arrival.       Overview/Hospital Course:  Patient is 71 year old female admitted for choledocholithiasis and UTI. Started on IV meropenem. ID consulted for recommendations on duration of antibiotic course given history of recurrent UTIs. GI consulted, plan for MRCP. General surgery and neuroendocrine following, no surgical intervention planned. "       Interval History: VSS overnight. Plan for MRCP today. ID consulted for duration of IV antibiotics.     Review of Systems   Constitutional:  Negative for chills and fever.   HENT:  Negative for congestion, rhinorrhea, sore throat and trouble swallowing.    Eyes:  Negative for visual disturbance.   Respiratory:  Negative for cough, chest tightness and shortness of breath.    Cardiovascular:  Negative for chest pain, palpitations and leg swelling.   Gastrointestinal:  Positive for abdominal pain. Negative for constipation, diarrhea, nausea and vomiting.   Genitourinary:  Positive for dysuria. Negative for difficulty urinating and hematuria.   Skin:  Negative for color change and pallor.   Neurological:  Positive for weakness (chronic, denies acute changes). Negative for dizziness, syncope, facial asymmetry, speech difficulty, light-headedness and headaches.   Psychiatric/Behavioral:  Negative for agitation, confusion and decreased concentration. The patient is nervous/anxious.    Objective:     Vital Signs (Most Recent):  Temp: 98.1 °F (36.7 °C) (03/02/22 1114)  Pulse: 66 (03/02/22 1114)  Resp: 18 (03/02/22 1114)  BP: (!) 175/71 (03/02/22 1114)  SpO2: 98 % (03/02/22 0406)   Vital Signs (24h Range):  Temp:  [98 °F (36.7 °C)-98.9 °F (37.2 °C)] 98.1 °F (36.7 °C)  Pulse:  [60-94] 66  Resp:  [16-89] 18  SpO2:  [98 %-100 %] 98 %  BP: (134-213)/(61-92) 175/71     Weight: 82 kg (180 lb 12.4 oz)  Body mass index is 29.18 kg/m².    Intake/Output Summary (Last 24 hours) at 3/2/2022 1240  Last data filed at 3/2/2022 0500  Gross per 24 hour   Intake 100 ml   Output 300 ml   Net -200 ml      Physical Exam  Constitutional:       General: She is not in acute distress.     Appearance: Normal appearance. She is normal weight. She is not ill-appearing or toxic-appearing.   HENT:      Head: Normocephalic and atraumatic.      Mouth/Throat:      Mouth: Mucous membranes are moist.      Pharynx: Oropharynx is clear.   Eyes:       Extraocular Movements: Extraocular movements intact.      Pupils: Pupils are equal, round, and reactive to light.   Cardiovascular:      Rate and Rhythm: Normal rate and regular rhythm.      Pulses: Normal pulses.      Heart sounds: Normal heart sounds. No murmur heard.  Pulmonary:      Effort: Pulmonary effort is normal. No respiratory distress.      Breath sounds: Normal breath sounds.   Abdominal:      General: Abdomen is flat. Bowel sounds are normal.      Palpations: Abdomen is soft.      Tenderness: There is abdominal tenderness in the right upper quadrant and suprapubic area. There is no rebound.   Musculoskeletal:      Right lower leg: No edema.      Left lower leg: No edema.   Skin:     General: Skin is warm and dry.   Neurological:      Mental Status: She is alert and oriented to person, place, and time. Mental status is at baseline.   Psychiatric:         Mood and Affect: Mood normal.         Behavior: Behavior normal.         Thought Content: Thought content normal.         Judgment: Judgment normal.       Significant Labs: All pertinent labs within the past 24 hours have been reviewed.  BMP:   Recent Labs   Lab 03/02/22  0614         K 4.8      CO2 26   BUN 19   CREATININE 1.2   CALCIUM 9.4   MG 1.9     CBC:   Recent Labs   Lab 03/01/22  1147 03/02/22  0614   WBC 5.87 4.78   HGB 10.6* 9.7*   HCT 34.3* 31.9*    253     CMP:   Recent Labs   Lab 03/01/22  1147 03/02/22  0614    139   K 4.7 4.8    106   CO2 21* 26    102   BUN 16 19   CREATININE 1.0 1.2   CALCIUM 9.3 9.4   PROT 7.3 7.1   ALBUMIN 3.6 3.2*   BILITOT 0.5 0.6   ALKPHOS 132 115   AST 12 18   ALT 8* 8*   ANIONGAP 9 7*   EGFRNONAA 58* 46*       Significant Imaging: I have reviewed all pertinent imaging results/findings within the past 24 hours.      Assessment/Plan:      * Choledocholithiasis  Patient complaining of RUQ pain     CT noted worsening intra and extrahepatic bile duct dilatation in setting  of normal LFTs with concern for choledocolithiais. Previous cholecystectomy, ERCP for choledocolithiasis 11/2021   Afebrile without leukocytosis and normal lipase    General surgery consulted, no surgical intervention. GI consulted, appreciate recommendations    - Per GI,  low threshold for AB coverage if febrile or clinical decompensation, clinically not consistent with cholangitis at this time  - Plan for MRCP today to evaluate need for ERCP  - Trend LFTs  - NPO for procedure    Dilation of biliary tract  See choledocholithiasis    Chronic diastolic heart failure with preserved ejection fraction  No acute symptoms indicative of HF exacerbation.  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  Latest blood pressure and vitals reviewed-   Temp:  [98 °F (36.7 °C)-98.9 °F (37.2 °C)]   Pulse:  [60-94]   Resp:  [16-89]   BP: (134-213)/(61-92)   SpO2:  [98 %-100 %] .   Home meds for hypertension were reviewed and noted below.   Hypertension Medications             losartan (COZAAR) 100 MG tablet Take 1 tablet (100 mg total) by mouth once daily.    metoprolol tartrate (LOPRESSOR) 25 MG tablet TAKE 1 TABLET TWICE DAILY        While in the hospital, will manage blood pressure as follows; Continue  home antihypertensive regimen    Chronic pain syndrome  History of colon cancer, followed by oncologist. Reports talking oxycodone at home    - Continue home dose oxycodone 15 mg q4 hrs PRN    Urinary tract infection without hematuria  History of UTIs. Prior cultures positive for klebsiella as well as multidrug resistant bacteria.    - Urine culture pending   - Continue IV meropenem   - ID consulted, appreciate recs    Nephrolithiasis  CT abdomen/pelvis showed right kidney with multifocal areas of cortical thinning likely related to parenchymal scarring, karge staghorn calculus noted within the inferior pole of the right renal collecting system, and additional bilateral nonobstructing renal stones with stone burden unchanged when compared to the previous CT without hydronephrosis or hydroureter    Patient reports regularly seeing her urologist with next appointment 3/15/22    - Will follow up with urologist as an outpatient    Neuroendocrine carcinoma, unknown primary site          VTE Risk Mitigation (From admission, onward)         Ordered     enoxaparin injection 40 mg  Daily         03/01/22 2321     IP VTE HIGH RISK PATIENT  Once         03/01/22 1635     Place sequential compression device  Until discontinued         03/01/22 1635                Discharge Planning   JULIO C: 3/4/2022     Code Status: Full Code   Is the patient medically ready for discharge?:     Reason for patient still in hospital (select all that apply): Patient trending condition                     Unique Ferguson PA-C  Department of Hospital Medicine   [facility]

## 2022-03-02 NOTE — ED NOTES
Recd report assumed care at this time. Pt with no complaints, has admit bed. Waiting for floor to take report and accept pt.

## 2022-03-02 NOTE — CONSULTS
[facility]  Gastroenterology  Consult Note    Patient Name: Patito Domingo  MRN: [id number]  Admission Date: 3/1/2022  Hospital Length of Stay: 0 days  Code Status: Full Code   Attending Provider: Eleno Osborne, *   Consulting Provider: Timmy Smith MD  Primary Care Physician: Yasir Myers Jr, MD  Principal Problem:Choledocholithiasis    Inpatient consult to Gastroenterology-[facility]  Consult performed by: Timmy Smith MD  Consult ordered by: Kaur Sánchez NP  Reason for consult: abnormal CT        Subjective:     HPI:  71F with metastatic small bowel carcinoid with mets to the liver s/p TACE, prior cholecystectomy whom GI is consulted for acute on chronic, symptomatic imaging abnormalities suspicious for choledocolithiasis.    She notes RUQ pain x 1 day, some chills at home but did not measure her temperature. She denies jaundice, dark urine, pale stools. She feels better this am. She additionally notes symptoms c/w recurrent UTI. CT in ER with worsening biliary dilation but normal LFTs on arrival. She had an ERCP with stone removal 2021.      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)        Past Surgical History:   Procedure Laterality Date    ABDOMINAL SURGERY      CATARACT EXTRACTION Left 10/2017     SECTION      CHOLECYSTECTOMY      COLON SURGERY      cystoscope      CYSTOSCOPY W/ RETROGRADES Right 10/10/2019    Procedure: CYSTOSCOPY, WITH RETROGRADE PYELOGRAM;  Surgeon: Gen Isbell MD;  Location: [facility];  Service: Urology;  Laterality: Right;    ERCP N/A 2021    Procedure: ERCP (ENDOSCOPIC RETROGRADE CHOLANGIOPANCREATOGRAPHY);   Surgeon: Jayjay Rivera MD;  Location: [facility] ENDO ([address] [place] [address]);  Service: Endoscopy;  Laterality: N/A;    EYE SURGERY      HYSTERECTOMY  5/1996    LITHOTRIPSY      LIVER BIOPSY  9/14    carcinoid    URETEROSCOPY Right 10/10/2019    Procedure: URETEROSCOPY;  Surgeon: Gen Isbell MD;  [facility];  Service: Urology;  Laterality: Right;    UTERINE FIBROID SURGERY         Review of patient's allergies indicates:   Allergen Reactions    Contrast media Hives, Itching and Swelling    Epinephrine Anaphylaxis     Can cause  a Carcinoid Crisis    Ibuprofen Hives, Itching and Swelling    Iodinated contrast media     Sulfa (sulfonamide antibiotics) Hives, Itching and Swelling     Family History       Problem Relation (Age of Onset)    Alzheimer's disease Father    Cancer Mother    No Known Problems Son, Son, Son, Son    Stroke Sister          Tobacco Use    Smoking status: Never Smoker    Smokeless tobacco: Never Used   Substance and Sexual Activity    Alcohol use: No     Alcohol/week: 0.0 standard drinks    Drug use: No    Sexual activity: Not Currently     Review of Systems   Constitutional:  Negative for activity change, appetite change, fatigue and fever.   HENT:  Negative for mouth sores and trouble swallowing.    Eyes:  Negative for photophobia and visual disturbance.   Respiratory:  Negative for cough, chest tightness and shortness of breath.    Cardiovascular:  Negative for chest pain and leg swelling.   Gastrointestinal:  Positive for abdominal pain. Negative for abdominal distention, anal bleeding, blood in stool, constipation, diarrhea, nausea, rectal pain and vomiting.   Endocrine: Negative for polydipsia and polyuria.   Genitourinary:  Positive for dysuria. Negative for difficulty urinating and vaginal bleeding.   Musculoskeletal:  Negative for arthralgias and back pain.   Skin:  Negative for color change and pallor.   Allergic/Immunologic: Negative for environmental allergies and food  allergies.   Neurological:  Negative for dizziness, speech difficulty and weakness.   Hematological:  Negative for adenopathy. Does not bruise/bleed easily.   Psychiatric/Behavioral:  Negative for confusion, self-injury and suicidal ideas.    Objective:     Vital Signs (Most Recent):  Temp: 98.1 °F (36.7 °C) (03/02/22 0745)  Pulse: 60 (03/02/22 0745)  Resp: 18 (03/02/22 0745)  BP: (!) 149/67 (03/02/22 0745)  SpO2: 98 % (03/02/22 0406)   Vital Signs (24h Range):  Temp:  [98 °F (36.7 °C)-98.9 °F (37.2 °C)] 98.1 °F (36.7 °C)  Pulse:  [60-94] 60  Resp:  [16-89] 18  SpO2:  [97 %-100 %] 98 %  BP: (134-213)/(61-92) 149/67     Weight: 82 kg (180 lb 12.4 oz) (03/01/22 2315)  Body mass index is 29.18 kg/m².      Intake/Output Summary (Last 24 hours) at 3/2/2022 1018  Last data filed at 3/2/2022 0500  Gross per 24 hour   Intake 100 ml   Output 300 ml   Net -200 ml       Lines/Drains/Airways       Peripheral Intravenous Line  Duration                  Peripheral IV - Single Lumen 03/01/22 2233 20 G Anterior;Left Forearm <1 day                    Physical Exam  Constitutional:       General: She is not in acute distress.     Appearance: She is ill-appearing.   HENT:      Head: Normocephalic and atraumatic.   Eyes:      General: No scleral icterus.     Conjunctiva/sclera: Conjunctivae normal.      Pupils: Pupils are equal, round, and reactive to light.   Cardiovascular:      Rate and Rhythm: Normal rate and regular rhythm.      Pulses: Normal pulses.      Heart sounds: Normal heart sounds.   Pulmonary:      Effort: No respiratory distress.      Breath sounds: No wheezing or rales.   Abdominal:      General: Bowel sounds are normal. There is no distension.      Palpations: Abdomen is soft.      Tenderness: There is no abdominal tenderness.      Comments: Healed surgical scars   Musculoskeletal:         General: No deformity. Normal range of motion.      Left lower leg: No edema.   Skin:     General: Skin is warm and dry.       Coloration: Skin is not jaundiced.   Neurological:      General: No focal deficit present.      Mental Status: She is alert and oriented to person, place, and time.      Cranial Nerves: No cranial nerve deficit.   Psychiatric:         Mood and Affect: Mood normal.         Thought Content: Thought content normal.         Judgment: Judgment normal.       Significant Labs:  CBC:   Recent Labs   Lab 03/01/22  1147 03/02/22  0614   WBC 5.87 4.78   HGB 10.6* 9.7*   HCT 34.3* 31.9*    253     CMP:   Recent Labs   Lab 03/01/22  1147 03/02/22  0614    102   CALCIUM 9.3 9.4   ALBUMIN 3.6  --    PROT 7.3  --     139   K 4.7 4.8   CO2 21* 26    106   BUN 16 19   CREATININE 1.0 1.2   ALKPHOS 132  --    ALT 8*  --    AST 12  --    BILITOT 0.5  --      Coagulation:   Recent Labs   Lab 03/02/22 0614   INR 1.0       Significant Imaging:  CT: I have reviewed all results within the past 24 hours and my personal findings are:  biliary dilation worsened from prior CT    Assessment/Plan:     Dilation of biliary tract  71F with metastatic small bowel NET presenting abdominal pain and LUTS with CT noting worsening bilary dilation in setting of normal LFTs with concern for choledocolithiais. Previous leslie, ERCP for choledocolithiasis 11/2021.  - MRCP to further evaluate need for ERCP  - Trend LFTs  - Low threshold for AB coverage if febrile or clinical decompensation, clinically not consistent with cholangitis at this time        Thank you for your consult. I will follow-up with patient. Please contact us if you have any additional questions.    Timmy Smith MD  Gastroenterology  [place] [facility]

## 2022-03-02 NOTE — ASSESSMENT & PLAN NOTE
History of colon cancer, followed by oncologist. Reports talking oxycodone at home    - Continue home dose oxycodone 15 mg q4 hrs PRN

## 2022-03-02 NOTE — PLAN OF CARE
Problem: Adult Inpatient Plan of Care  Goal: Plan of Care Review  3/2/2022 0248 by Geneveive Das RN    Chart reviewed and care plan initiated

## 2022-03-02 NOTE — PLAN OF CARE
Problem: Physical Therapy Goal  Goal: Physical Therapy Goal  Description: Goals to be met by: 22     Patient will increase functional independence with mobility by performin. Supine <> sit with Modified [place]  2. Sit to stand transfer with Modified [place]  3. Bed to chair transfer with Modified [place]   4. Gait  x 100 feet with Supervision   5. Lower extremity exercise program x 10 reps per handout, with supervision    Outcome: Ongoing, Progressing     PT evaluation completed, note to follow. Pt ambulated 30 ft with rollator at supervision level. Recommending return home with HH PT/OT upon d/c.

## 2022-03-02 NOTE — ASSESSMENT & PLAN NOTE
71F with metastatic small bowel NET presenting abdominal pain and LUTS with CT noting worsening bilary dilation in setting of normal LFTs with concern for choledocolithiais. Previous leslie, ERCP for choledocolithiasis 11/2021.  - MRCP to further evaluate need for ERCP  - Trend LFTs  - Low threshold for AB coverage if febrile or clinical decompensation, clinically not consistent with cholangitis at this time

## 2022-03-02 NOTE — PT/OT/SLP EVAL
Occupational Therapy   Evaluation and Discharge Note    Name: Patito Domingo  MRN: [id number]  Admitting Diagnosis:  Choledocholithiasis   Recent Surgery: * No surgery found *      Recommendations:     Discharge Recommendations: home health PT, home health OT (with family care/assist)  Discharge Equipment Recommendations:  none  Barriers to discharge:  None    Assessment:     Patito Domingo is a 71 y.o. female with a medical diagnosis of Choledocholithiasis. At this time, patient is functioning at their prior level of function and does not require further acute OT services.     Plan:     During this hospitalization, patient does not require further acute OT services.  Please re-consult if situation changes.    · Plan of Care Reviewed with: patient    Subjective     Chief Complaint: Pain in abdomen  Patient/Family Comments/goals: Return to PLOF    Occupational Profile:  Pt lives alone in a 2nd floor apt with 1 flight of KESHAWN with B HR and tub/shower combo with shower chair and grab bars.  Prior to admission, patients level of function was mod I with rollator for mobility and ADLs besides requiring assist for bathing.  Equipment used at home: bedside commode, walker, rolling, rollator, wheelchair, shower chair, grab bar.  DME owned (not currently used): rolling walker.   Upon discharge, patient will have assistance from .    Pain/Comfort:  · Pain Rating 1: 7/10  · Location - Side 1: Right  · Location - Orientation 1: upper  · Location 1: abdomen  · Pain Addressed 1: Reposition, Distraction  · Pain Rating Post-Intervention 1: 8/10  · Pain Rating 2: other (see comments) (reports she feels she has an ingrown toenail on her L great toe)    Patients cultural, spiritual, Muslim conflicts given the current situation: no    Objective:     Communicated with: marques prior to session.  Patient found HOB elevated with bed alarm upon OT entry to room.    General Precautions: Standard, fall, contact   Orthopedic  "Precautions:N/A   Braces: N/A  Respiratory Status: Room air     Occupational Performance:    Bed Mobility:    · Patient completed Scooting/Bridging with supervision  · Patient completed Supine to Sit with supervision    Functional Mobility/Transfers:  · Patient completed Sit <> Stand Transfer with supervision  with  personal rollator   · Patient completed Bed <> Chair Transfer using Step Transfer technique with supervision with personal rollator  · Functional Mobility: Pt performed functional mobility in room with Sup and use of rollator, decreased gait speed, pt forward flexed at waist, reporting L hamstring feels "tight"    Activities of Daily Living:  · Lower Body Dressing: supervision seated EOB to adjust B sock  · Toileting: supervision for hygiene & clothing management on toilet   · Grooming: Pt stood at sink with Sup to perform hand washing    Cognitive/Visual Perceptual:  Cognitive/Psychosocial Skills:     -       Oriented to: Person, Place, Time and Situation   -       Follows Commands/attention:Follows multistep  commands  -       Mood/Affect/Coping skills/emotional control: Cooperative and Pleasant    Physical Exam:  Upper Extremity Range of Motion:     -       Right Upper Extremity: WFL except decreased shoulder flex  -       Left Upper Extremity: WFL except decreased shoulder flex  Upper Extremity Strength:    -       Right Upper Extremity: 3+/5 proximally, 4/5 distally  -       Left Upper Extremity: 3+/5 proximally, 4/5 distally   Strength: -       Right Upper Extremity: Fair  -       Left Upper Extremity: Fair    AMPAC 6 Click ADL:  AMPAC Total Score: 22    Treatment & Education:  OT shalom performed this date with PT.   Pt with c/o 7-8/10 pain in R upper abdomen.   Pt performed bed mobility with Sup.  Pt performed functional mobility in room with Sup & use of rollator.   Pt performed toileting with Sup and increased time for toilet t/f.   Recommending HH OT/PT upon d/c with family care/assistance. "   Education:    Patient left up in chair with all lines intact, call button in reach, chair alarm on and nsg notified    GOALS:   Multidisciplinary Problems     Occupational Therapy Goals        Problem: Occupational Therapy Goal    Goal Priority Disciplines Outcome Interventions   Occupational Therapy Goal     OT, PT/OT Adequate for Care Transition                    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)        Past Surgical History:   Procedure Laterality Date    ABDOMINAL SURGERY      CATARACT EXTRACTION Left 10/2017     SECTION      CHOLECYSTECTOMY      COLON SURGERY      cystoscope      CYSTOSCOPY W/ RETROGRADES Right 10/10/2019    Procedure: CYSTOSCOPY, WITH RETROGRADE PYELOGRAM;  Surgeon: Gen Isbell MD;  Location: [facility];  Service: Urology;  Laterality: Right;    ERCP N/A 2021    Procedure: ERCP (ENDOSCOPIC RETROGRADE CHOLANGIOPANCREATOGRAPHY);  Surgeon: Jayjay Rviera MD;  Location: [address]);  Service: Endoscopy;  Laterality: N/A;    EYE SURGERY      HYSTERECTOMY  1996    LITHOTRIPSY      LIVER BIOPSY      carcinoid    URETEROSCOPY Right 10/10/2019    Procedure: URETEROSCOPY;  Surgeon: Gen Isbell MD;  Location: [facility];  Service: Urology;  Laterality: Right;    UTERINE FIBROID SURGERY         Time Tracking:     OT Date of Treatment: 22  OT Start Time: 851  OT Stop Time: 918  OT Total Time (min): 27 min    Billable Minutes:Evaluation 12 co eval with PT    3/2/2022

## 2022-03-02 NOTE — ASSESSMENT & PLAN NOTE
No acute symptoms indicative of HF exacerbation.  Most recent echo 07/21 showed EF 70% with grade I diastolic dysfunction and no pulmonary hypertension    - Continue to monitor

## 2022-03-02 NOTE — ASSESSMENT & PLAN NOTE
Hypertensive in ED, improved with hydralazine 10mg IV     Chronic, controlled.  Latest blood pressure and vitals reviewed-   Temp:  [98 °F (36.7 °C)-98.9 °F (37.2 °C)]   Pulse:  [60-94]   Resp:  [16-89]   BP: (134-213)/(61-92)   SpO2:  [98 %-100 %] .   Home meds for hypertension were reviewed and noted below.   Hypertension Medications             losartan (COZAAR) 100 MG tablet Take 1 tablet (100 mg total) by mouth once daily.    metoprolol tartrate (LOPRESSOR) 25 MG tablet TAKE 1 TABLET TWICE DAILY        While in the hospital, will manage blood pressure as follows; Continue home antihypertensive regimen

## 2022-03-02 NOTE — ASSESSMENT & PLAN NOTE
CT abdomen/pelvis showed right kidney with multifocal areas of cortical thinning likely related to parenchymal scarring, karge staghorn calculus noted within the inferior pole of the right renal collecting system, and additional bilateral nonobstructing renal stones with stone burden unchanged when compared to the previous CT without hydronephrosis or hydroureter    Patient reports regularly seeing her urologist with next appointment 3/15/22    - Will follow up with urologist as an outpatient

## 2022-03-03 ENCOUNTER — PATIENT OUTREACH (OUTPATIENT)
Dept: ADMINISTRATIVE | Facility: OTHER | Age: 72
End: 2022-03-03
Payer: MEDICARE

## 2022-03-03 LAB
ALBUMIN SERPL BCP-MCNC: 3.2 G/DL (ref 3.5–5.2)
ALP SERPL-CCNC: 114 U/L (ref 55–135)
ALT SERPL W/O P-5'-P-CCNC: 9 U/L (ref 10–44)
ANION GAP SERPL CALC-SCNC: 10 MMOL/L (ref 8–16)
ANION GAP SERPL CALC-SCNC: 9 MMOL/L (ref 8–16)
AST SERPL-CCNC: 13 U/L (ref 10–40)
BACTERIA UR CULT: ABNORMAL
BASOPHILS # BLD AUTO: 0.03 K/UL (ref 0–0.2)
BASOPHILS NFR BLD: 0.7 % (ref 0–1.9)
BILIRUB SERPL-MCNC: 0.6 MG/DL (ref 0.1–1)
BUN SERPL-MCNC: 19 MG/DL (ref 8–23)
BUN SERPL-MCNC: 19 MG/DL (ref 8–23)
CALCIUM SERPL-MCNC: 9.4 MG/DL (ref 8.7–10.5)
CALCIUM SERPL-MCNC: 9.5 MG/DL (ref 8.7–10.5)
CHLORIDE SERPL-SCNC: 107 MMOL/L (ref 95–110)
CHLORIDE SERPL-SCNC: 107 MMOL/L (ref 95–110)
CO2 SERPL-SCNC: 22 MMOL/L (ref 23–29)
CO2 SERPL-SCNC: 23 MMOL/L (ref 23–29)
CREAT SERPL-MCNC: 1.1 MG/DL (ref 0.5–1.4)
CREAT SERPL-MCNC: 1.1 MG/DL (ref 0.5–1.4)
CRP SERPL-MCNC: 15.6 MG/L (ref 0–8.2)
DIFFERENTIAL METHOD: ABNORMAL
EOSINOPHIL # BLD AUTO: 0.1 K/UL (ref 0–0.5)
EOSINOPHIL NFR BLD: 2.8 % (ref 0–8)
ERYTHROCYTE [DISTWIDTH] IN BLOOD BY AUTOMATED COUNT: 13.5 % (ref 11.5–14.5)
EST. GFR  (AFRICAN AMERICAN): 59 ML/MIN/1.73 M^2
EST. GFR  (AFRICAN AMERICAN): 59 ML/MIN/1.73 M^2
EST. GFR  (NON AFRICAN AMERICAN): 51 ML/MIN/1.73 M^2
EST. GFR  (NON AFRICAN AMERICAN): 51 ML/MIN/1.73 M^2
GLUCOSE SERPL-MCNC: 93 MG/DL (ref 70–110)
GLUCOSE SERPL-MCNC: 93 MG/DL (ref 70–110)
HCT VFR BLD AUTO: 34.7 % (ref 37–48.5)
HGB BLD-MCNC: 10.7 G/DL (ref 12–16)
IMM GRANULOCYTES # BLD AUTO: 0.02 K/UL (ref 0–0.04)
IMM GRANULOCYTES NFR BLD AUTO: 0.4 % (ref 0–0.5)
LYMPHOCYTES # BLD AUTO: 1.2 K/UL (ref 1–4.8)
LYMPHOCYTES NFR BLD: 25.7 % (ref 18–48)
MAGNESIUM SERPL-MCNC: 2.1 MG/DL (ref 1.6–2.6)
MCH RBC QN AUTO: 27 PG (ref 27–31)
MCHC RBC AUTO-ENTMCNC: 30.8 G/DL (ref 32–36)
MCV RBC AUTO: 88 FL (ref 82–98)
MONOCYTES # BLD AUTO: 0.6 K/UL (ref 0.3–1)
MONOCYTES NFR BLD: 12.6 % (ref 4–15)
NEUTROPHILS # BLD AUTO: 2.7 K/UL (ref 1.8–7.7)
NEUTROPHILS NFR BLD: 57.8 % (ref 38–73)
NRBC BLD-RTO: 0 /100 WBC
PHOSPHATE SERPL-MCNC: 3.5 MG/DL (ref 2.7–4.5)
PLATELET # BLD AUTO: 241 K/UL (ref 150–450)
PMV BLD AUTO: 10.5 FL (ref 9.2–12.9)
POTASSIUM SERPL-SCNC: 4.3 MMOL/L (ref 3.5–5.1)
POTASSIUM SERPL-SCNC: 4.3 MMOL/L (ref 3.5–5.1)
PREALB SERPL-MCNC: 15 MG/DL (ref 20–43)
PROT SERPL-MCNC: 7.2 G/DL (ref 6–8.4)
RBC # BLD AUTO: 3.96 M/UL (ref 4–5.4)
SODIUM SERPL-SCNC: 139 MMOL/L (ref 136–145)
SODIUM SERPL-SCNC: 139 MMOL/L (ref 136–145)
WBC # BLD AUTO: 4.6 K/UL (ref 3.9–12.7)

## 2022-03-03 PROCEDURE — 99232 PR SUBSEQUENT HOSPITAL CARE,LEVL II: ICD-10-PCS | Mod: HCNC,GC,, | Performed by: INTERNAL MEDICINE

## 2022-03-03 PROCEDURE — 86140 C-REACTIVE PROTEIN: CPT | Mod: HCNC | Performed by: SURGERY

## 2022-03-03 PROCEDURE — 25000003 PHARM REV CODE 250: Mod: HCNC | Performed by: NURSE PRACTITIONER

## 2022-03-03 PROCEDURE — 99232 SBSQ HOSP IP/OBS MODERATE 35: CPT | Mod: HCNC,GC,, | Performed by: INTERNAL MEDICINE

## 2022-03-03 PROCEDURE — 99233 PR SUBSEQUENT HOSPITAL CARE,LEVL III: ICD-10-PCS | Mod: NSCH,GC,, | Performed by: INTERNAL MEDICINE

## 2022-03-03 PROCEDURE — 36415 COLL VENOUS BLD VENIPUNCTURE: CPT | Mod: HCNC | Performed by: NURSE PRACTITIONER

## 2022-03-03 PROCEDURE — 85025 COMPLETE CBC W/AUTO DIFF WBC: CPT | Mod: HCNC | Performed by: NURSE PRACTITIONER

## 2022-03-03 PROCEDURE — 99233 SBSQ HOSP IP/OBS HIGH 50: CPT | Mod: NSCH,GC,, | Performed by: INTERNAL MEDICINE

## 2022-03-03 PROCEDURE — 11000001 HC ACUTE MED/SURG PRIVATE ROOM: Mod: HCNC

## 2022-03-03 PROCEDURE — 25000003 PHARM REV CODE 250: Mod: HCNC | Performed by: HOSPITALIST

## 2022-03-03 PROCEDURE — 83735 ASSAY OF MAGNESIUM: CPT | Mod: HCNC | Performed by: NURSE PRACTITIONER

## 2022-03-03 PROCEDURE — 63600175 PHARM REV CODE 636 W HCPCS: Mod: HCNC | Performed by: HOSPITALIST

## 2022-03-03 PROCEDURE — 36415 COLL VENOUS BLD VENIPUNCTURE: CPT | Mod: HCNC | Performed by: SURGERY

## 2022-03-03 PROCEDURE — 27000207 HC ISOLATION: Mod: HCNC

## 2022-03-03 PROCEDURE — 84134 ASSAY OF PREALBUMIN: CPT | Mod: HCNC | Performed by: SURGERY

## 2022-03-03 PROCEDURE — 84100 ASSAY OF PHOSPHORUS: CPT | Mod: HCNC | Performed by: SURGERY

## 2022-03-03 PROCEDURE — 94761 N-INVAS EAR/PLS OXIMETRY MLT: CPT | Mod: HCNC

## 2022-03-03 PROCEDURE — 80053 COMPREHEN METABOLIC PANEL: CPT | Mod: HCNC | Performed by: SURGERY

## 2022-03-03 PROCEDURE — 80048 BASIC METABOLIC PNL TOTAL CA: CPT | Mod: HCNC | Performed by: NURSE PRACTITIONER

## 2022-03-03 RX ORDER — OCTREOTIDE ACETATE 500 UG/ML
250 INJECTION, SOLUTION INTRAVENOUS; SUBCUTANEOUS ONCE
Status: COMPLETED | OUTPATIENT
Start: 2022-03-04 | End: 2022-03-04

## 2022-03-03 RX ORDER — OCTREOTIDE ACETATE 500 UG/ML
25 INJECTION, SOLUTION INTRAVENOUS; SUBCUTANEOUS ONCE
Status: DISCONTINUED | OUTPATIENT
Start: 2022-03-04 | End: 2022-03-03

## 2022-03-03 RX ADMIN — OXYCODONE 15 MG: 5 TABLET ORAL at 09:03

## 2022-03-03 RX ADMIN — METOPROLOL TARTRATE 25 MG: 25 TABLET, FILM COATED ORAL at 09:03

## 2022-03-03 RX ADMIN — ENOXAPARIN SODIUM 40 MG: 100 INJECTION SUBCUTANEOUS at 04:03

## 2022-03-03 RX ADMIN — MEROPENEM 1 G: 1 INJECTION, POWDER, FOR SOLUTION INTRAVENOUS at 11:03

## 2022-03-03 RX ADMIN — OXYCODONE 15 MG: 5 TABLET ORAL at 05:03

## 2022-03-03 RX ADMIN — ATORVASTATIN CALCIUM 40 MG: 40 TABLET, FILM COATED ORAL at 09:03

## 2022-03-03 RX ADMIN — CYANOCOBALAMIN TAB 1000 MCG 1000 MCG: 1000 TAB at 09:03

## 2022-03-03 RX ADMIN — LOSARTAN POTASSIUM 100 MG: 50 TABLET, FILM COATED ORAL at 09:03

## 2022-03-03 RX ADMIN — OXYCODONE 15 MG: 5 TABLET ORAL at 04:03

## 2022-03-03 RX ADMIN — MEROPENEM 1 G: 1 INJECTION, POWDER, FOR SOLUTION INTRAVENOUS at 09:03

## 2022-03-03 RX ADMIN — OXYCODONE 15 MG: 5 TABLET ORAL at 11:03

## 2022-03-03 NOTE — PROGRESS NOTES
Neuroendocrine Surgery Progress Note  Admit Date: 3/1/2022  Hospital Day: 0  Procedure:   none    S:  Improved dysuria  Moderate pain this AM, controlled with meds  Ambulating with walker to restroom  abx on board    O:  Vitals:   Temp:  [98.1 °F (36.7 °C)-98.7 °F (37.1 °C)]   Pulse:  [55-71]   Resp:  [16-19]   BP: (132-176)/(62-81)   SpO2:  [96 %-98 %]     Diet NPO No intake or output data in the 24 hours ending 03/03/22 0824         Physical Exam:  NAD  RR  No increased WOB, room air   Abdomen soft, non distended. Tender in mid right upper and lower abdomen. Well healed midline and open leslie scars      Labs:  Recent Labs     03/01/22  1147 03/02/22  0614 03/03/22  0102 03/03/22  0517 03/03/22  0518   WBC 5.87 4.78  --   --  4.60   HGB 10.6* 9.7*  --   --  10.7*   HCT 34.3* 31.9*  --   --  34.7*    253  --   --  241    139  --  139 139   K 4.7 4.8  --  4.3 4.3    106  --  107 107   CO2 21* 26  --  23 22*   BUN 16 19  --  19 19   CREATININE 1.0 1.2  --  1.1 1.1   BILITOT 0.5 0.6  --   --  0.6   AST 12 18  --   --  13   ALT 8* 8*  --   --  9*   ALKPHOS 132 115  --   --  114   CALCIUM 9.3 9.4  --  9.5 9.4   ALBUMIN 3.6 3.2*  --   --  3.2*   PROT 7.3 7.1  --   --  7.2   MG  --  1.9  --  2.1  --    PHOS  --   --  3.5  --   --      Ucx: GNR    Scheduled Meds: atorvastatin, 40 mg, QHS  cyanocobalamin, 1,000 mcg, Daily  enoxaparin, 40 mg, Daily  losartan, 100 mg, Daily  meropenem (MERREM) IVPB, 1 g, Q12H  metoprolol tartrate, 25 mg, BID        A/P:  71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 No leukocytosis, elevated bilirubin or LFTs to suggest acute abdominal etiology. MRCP pending. Suspected liver mass corresponding to abdominal pain. GNR in urine on meropenem    - daily CMPs  - pending possible choledochoduodenostomy for recurrent stones and tender abdominal tumor  -  if patient were to undergo ERCP, would need octreotide coverage prior.  - continue UTI management per primary team. ID on board  - recommend PET scan at discharge    Kalina Turner MD  [facility] HO-[facility]

## 2022-03-03 NOTE — PROGRESS NOTES
"[facility] Medicine  Progress Note    Patient Name: Patito Domingo  MRN: [id number]  Patient Class: IP- Inpatient   Admission Date: 3/1/2022  Length of Stay: 0 days  Attending Physician: Eleno Osborne, *  Primary Care Provider: Yasir Myers Jr, MD        Subjective:     Principal Problem:Choledocholithiasis        HPI:  Patito Domingo is a 72 yo female with a pmh of cholecystectomy, colon cancer, liver tumor, CVA, HTN, frequent UTI. She presented with abdominal pain that started yesterday evening. She described right sided abdominal pain into the RUQ that is worse when she bends forward. She denies fevers but does report chills yesterday. She takes narcotics for chronic pain due to colon cancer but states this pain was different than normal. She reports having her gall bladder removed a few years ago. No change in appetite. She also reports vaginal irritation and dysuria which has been ongoing. She was admitted here earlier this month for a UTI, treated with merrem. She is followed by urology outpatient. She had a UA done on 2/23/22 due to urinary symptoms and found to be positive for UTI. She was prescribed macrobid but did not take it because 'it does not work". Today in the ED, she was found to have a UTI. CT abdomen with concern for choledocholithiasis with similar to slightly increased intra and extrahepatic bile duct dilatation. The patient was given a dose of macrobid in the ED and neuroendocrine was consulted per ED provider. She was also hypertensive on arrival and was given hydralazine IV. No leukocytosis or  elevated LFTs on arrival.       Overview/Hospital Course:  Patient is 71 year old female admitted for choledocholithiasis and UTI. Started on IV meropenem. ID consulted for recommendations on duration of antibiotic course given history of recurrent UTIs. GI consulted, plan for MRCP. General surgery and neuroendocrine following, no surgical intervention planned. "       Interval History: doing well; reports abdominal pain and dysuria both improving today. Feels better than yesterday. MRCP c/w choledocholithiasis. Discussed with GI, tentative plan for ERCP tomorrow.    Review of Systems   Constitutional:  Negative for chills and fever.   Respiratory:  Negative for shortness of breath.    Gastrointestinal:  Positive for abdominal pain.   Genitourinary:  Positive for dysuria.   Neurological:  Positive for weakness.   Objective:     Vital Signs (Most Recent):  Temp: 98.5 °F (36.9 °C) (03/03/22 1221)  Pulse: 69 (03/03/22 1222)  Resp: 18 (03/03/22 1221)  BP: 136/64 (03/03/22 1222)  SpO2: 98 % (03/03/22 1221)   Vital Signs (24h Range):  Temp:  [98.1 °F (36.7 °C)-98.7 °F (37.1 °C)] 98.5 °F (36.9 °C)  Pulse:  [55-71] 69  Resp:  [16-20] 18  SpO2:  [96 %-98 %] 98 %  BP: (132-176)/(62-81) 136/64     Weight: 83 kg (182 lb 15.7 oz)  Body mass index is 29.53 kg/m².    Intake/Output Summary (Last 24 hours) at 3/3/2022 1312  Last data filed at 3/3/2022 0900  Gross per 24 hour   Intake --   Output 400 ml   Net -400 ml        Physical Exam  Constitutional:       General: She is not in acute distress.     Appearance: Normal appearance. She is normal weight. She is not ill-appearing or toxic-appearing.   HENT:      Head: Normocephalic and atraumatic.      Mouth/Throat:      Mouth: Mucous membranes are moist.      Pharynx: Oropharynx is clear.   Eyes:      Extraocular Movements: Extraocular movements intact.      Pupils: Pupils are equal, round, and reactive to light.   Cardiovascular:      Rate and Rhythm: Normal rate and regular rhythm.      Pulses: Normal pulses.      Heart sounds: Normal heart sounds. No murmur heard.  Pulmonary:      Effort: Pulmonary effort is normal. No respiratory distress.      Breath sounds: Normal breath sounds.   Abdominal:      General: Abdomen is flat. Bowel sounds are normal.      Palpations: Abdomen is soft.      Tenderness: There is abdominal tenderness in the  right upper quadrant and suprapubic area. There is no rebound.   Musculoskeletal:      Right lower leg: No edema.      Left lower leg: No edema.   Skin:     General: Skin is warm and dry.   Neurological:      Mental Status: She is alert and oriented to person, place, and time. Mental status is at baseline.   Psychiatric:         Mood and Affect: Mood normal.         Behavior: Behavior normal.         Thought Content: Thought content normal.         Judgment: Judgment normal.       Significant Labs: All pertinent labs within the past 24 hours have been reviewed.  BMP:   Recent Labs   Lab 03/03/22  0517 03/03/22  0518   GLU 93 93    139   K 4.3 4.3    107   CO2 23 22*   BUN 19 19   CREATININE 1.1 1.1   CALCIUM 9.5 9.4   MG 2.1  --        CBC:   Recent Labs   Lab 03/02/22 0614 03/03/22 0518   WBC 4.78 4.60   HGB 9.7* 10.7*   HCT 31.9* 34.7*    241       CMP:   Recent Labs   Lab 03/02/22 0614 03/03/22 0517 03/03/22  0518    139 139   K 4.8 4.3 4.3    107 107   CO2 26 23 22*    93 93   BUN 19 19 19   CREATININE 1.2 1.1 1.1   CALCIUM 9.4 9.5 9.4   PROT 7.1  --  7.2   ALBUMIN 3.2*  --  3.2*   BILITOT 0.6  --  0.6   ALKPHOS 115  --  114   AST 18  --  13   ALT 8*  --  9*   ANIONGAP 7* 9 10   EGFRNONAA 46* 51* 51*         Significant Imaging: I have reviewed all pertinent imaging results/findings within the past 24 hours.      Assessment/Plan:      * Choledocholithiasis  Patient complaining of RUQ pain     CT noted worsening intra and extrahepatic bile duct dilatation in setting of normal LFTs with concern for choledocolithiais. Previous cholecystectomy, ERCP for choledocolithiasis 11/2021   Afebrile without leukocytosis and normal lipase    General surgery consulted, no surgical intervention. GI consulted, appreciate recommendations    - Per GI,  low threshold for AB coverage if febrile or clinical decompensation, clinically not consistent with cholangitis at this time  - MRCP c/w  choledocholithiasis; [place] [facility] [place]  - NPO@MN  - Surgery tentatively planning cytoreduction +/- choledochoduodenostomy    Dilation of biliary tract  See choledocholithiasis    Chronic diastolic heart failure with preserved ejection fraction  No acute symptoms indicative of HF exacerbation.  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  Latest blood pressure and vitals reviewed-   Temp:  [98.1 °F (36.7 °C)-98.7 °F (37.1 °C)]   Pulse:  [55-71]   Resp:  [16-20]   BP: (132-176)/(62-81)   SpO2:  [96 %-98 %] .   Home meds for hypertension were reviewed and noted below.   Hypertension Medications             losartan (COZAAR) 100 MG tablet Take 1 tablet (100 mg total) by mouth once daily.    metoprolol tartrate (LOPRESSOR) 25 MG tablet TAKE 1 TABLET TWICE DAILY        While in the hospital, will manage blood pressure as follows; Continue home antihypertensive regimen    Chronic pain syndrome  History of colon cancer, followed by oncologist. Reports talking oxycodone at home    - Continue home dose oxycodone 15 mg q4 hrs PRN    Urinary tract infection without hematuria  History of UTIs. Prior cultures positive for klebsiella as well as multidrug  resistant bacteria.    - Urine culture pending; hx of ESBL  - Continue IV meropenem   - ID consulted, appreciate recs    Nephrolithiasis  CT abdomen/pelvis showed right kidney with multifocal areas of cortical thinning likely related to parenchymal scarring, karge staghorn calculus noted within the inferior pole of the right renal collecting system, and additional bilateral nonobstructing renal stones with stone burden unchanged when compared to the previous CT without hydronephrosis or hydroureter    Patient reports regularly seeing her urologist with next appointment 3/15/22    - Will follow up with urologist as an outpatient    Neuroendocrine carcinoma, unknown primary site          VTE Risk Mitigation (From admission, onward)         Ordered     enoxaparin injection 40 mg  Daily         03/01/22 2321     IP VTE HIGH RISK PATIENT  Once         03/01/22 1635     Place sequential compression device  Until discontinued         03/01/22 1635                Discharge Planning   JULIO C: 3/7/2022     Code Status: Full Code   Is the patient medically ready for discharge?:     Reason for patient still in hospital (select all that apply): Treatment and Consult recommendations                     Eleno Osborne MD  Department of Hospital Medicine   [facility]

## 2022-03-03 NOTE — PROGRESS NOTES
[facility] Infectious Diseases Progress Note    Assessment/Plan:     UTI  History of recurrent UTI, previously admitted on 22 with klebsiella UTI and completed 14 day course of meropenem  - UA with pos nitrites, 3+ leukocyte esterase, moderate bacteria, and 73 WBC  - urine culture with >100,000 CFU of gram negative rods, ID and sensitivities pending  - recommend to continue meropenem  - adjust antibiotics regimen pending urine cultures    Haydee Montalvo MD  [facility] Internal Medicine/Pediatrics PGY-2  [facility] Infectious Diseases Consult Service  Cell: [phone]    Thank you for allowing us to participate in the care of this patient. We will continue to follow along. Case has been discussed with consult staff, who is in agreement with assessment and plan. ID will continue to follow.     Subjective:      No acute events overnight. Denies any fevers and chills, Continues to have R sided abdominal pain. Reports that urine is yellow and no longer foul smelling. She is very frustrated because she has not been able to eat in 2 days.     Objective:   Last 24 Hour Vital Signs:  BP  Min: 132/62  Max: 176/81  Temp  Av.3 °F (36.8 °C)  Min: 98.1 °F (36.7 °C)  Max: 98.7 °F (37.1 °C)  Pulse  Av.7  Min: 55  Max: 71  Resp  Av.3  Min: 16  Max: 19  SpO2  Av.2 %  Min: 96 %  Max: 98 %  Weight  Av kg (182 lb 15.7 oz)  Min: 83 kg (182 lb 15.7 oz)  Max: 83 kg (182 lb 15.7 oz)  I/O last 3 completed shifts:  In: -   Out: 300 [Urine:300]    Physical Exam  Constitutional:       General: She is not in acute distress.     Appearance: Normal appearance. She is ill-appearing.   HENT:      Head: Normocephalic and atraumatic.      Nose: Nose normal. No congestion or rhinorrhea.      Mouth/Throat:      Mouth: Mucous membranes are moist.   Eyes:      Extraocular Movements: Extraocular movements intact.      Conjunctiva/sclera: Conjunctivae normal.      Pupils: Pupils are equal, round, and reactive to light.   Cardiovascular:      Rate  and Rhythm: Normal rate and regular rhythm.      Heart sounds: Normal heart sounds. No murmur heard.  Pulmonary:      Effort: Pulmonary effort is normal.      Breath sounds: Normal breath sounds.   Abdominal:      General: Abdomen is flat. Bowel sounds are normal.      Palpations: Abdomen is soft.      Tenderness: There is abdominal tenderness (R sided ). There is no guarding.   Musculoskeletal:         General: Normal range of motion.      Right lower leg: No edema.      Left lower leg: No edema.   Skin:     General: Skin is warm and dry.   Neurological:      General: No focal deficit present.      Mental Status: She is alert and oriented to person, place, and time.   Psychiatric:         Mood and Affect: Mood normal.         Behavior: Behavior normal.         Laboratory:  Laboratory Data Reviewed: yes  Pertinent Findings:  Recent Labs   Lab 03/01/22  1147 03/02/22  0614 03/03/22  0517 03/03/22  0518   WBC 5.87 4.78  --  4.60   HGB 10.6* 9.7*  --  10.7*   HCT 34.3* 31.9*  --  34.7*    253  --  241   MCV 88 88  --  88   RDW 13.5 13.7  --  13.5    139 139 139   K 4.7 4.8 4.3 4.3    106 107 107   CO2 21* 26 23 22*   BUN 16 19 19 19   CREATININE 1.0 1.2 1.1 1.1    102 93 93   PROT 7.3 7.1  --  7.2   ALBUMIN 3.6 3.2*  --  3.2*   BILITOT 0.5 0.6  --  0.6   AST 12 18  --  13   ALKPHOS 132 115  --  114   ALT 8* 8*  --  9*         Microbiology Data:  Microbiology Results (last 7 days)     Procedure Component Value Units Date/Time    Urine culture [[id number]]  (Abnormal) Collected: 03/01/22 1116    Order Status: Completed Specimen: Urine Updated: 03/02/22 1725     Urine Culture, Routine GRAM NEGATIVE JADEN  >100,000 cfu/ml  Identification and susceptibility pending      Narrative:      Specimen Source->Urine          Antimicrobials:  Antibiotics (From admission, onward)            Start     Stop Route Frequency Ordered    03/02/22 2138  meropenem 1 g in sodium chloride 0.9 % 100 mL IVPB (ready to mix  system)         -- IV Every 12 hours (non-standard times) 03/02/22 1037          Other Results:  Radiology Results:  X-Ray Chest 1 View    Result Date: 2/2/2022  EXAMINATION: XR CHEST 1 VIEW COMPARISON: Comparison is made to 11/20/2021, 11/09/2021, and 10/05/2021. FINDINGS: Even allowing for magnification of the cardiomediastinal silhouette related to projection, the heart appears minimally enlarged, though the cardiomediastinal silhouette demonstrates no detrimental change since the examinations referenced above.  Pulmonary vascularity is normal, and there are no findings indicating current cardiac decompensation.  Lung zones appear essentially clear, and are free of significant airspace consolidation or volume loss.  No pleural fluid.  No pneumothorax.     Mild cardiomegaly.  No significant detrimental interval change in the appearance of the chest since 11/20/2021. Electronically signed by: Roby Brice MD Date:    02/02/2022 Time:    06:25    US Retroperitoneal Complete    Result Date: 2/4/2022  EXAMINATION: US RETROPERITONEAL COMPLETE CLINICAL HISTORY: f/u hydronephrosis; TECHNIQUE: Ultrasound of the kidneys and urinary bladder was performed including color flow and Doppler evaluation of the kidneys. COMPARISON: CT abdomen pelvis dated 02/02/2022 FINDINGS: Right kidney measures 9.7 cm.  Resistive index of 0.74.  Renal calculi, largest at the lower pole measuring 1.7 cm.  No hydronephrosis. Left kidney: Measures 11.2 cm.  Resistive index of 0.76.  Calculus at the lower pole measuring 1.1 cm.  No hydronephrosis. Urinary bladder is partially fluid-filled at the time of scanning and otherwise unremarkable.     No hydronephrosis. Nonobstructing renal calculi. Electronically signed by: Rodolfo Jones Date:    02/04/2022 Time:    13:51    CT Abdomen Pelvis With Contrast    Result Date: 2/2/2022  EXAMINATION: CT ABDOMEN PELVIS WITH CONTRAST CLINICAL HISTORY: Abdominal pain, acute, nonlocalized;hx of carcinoid tumor;  TECHNIQUE: Low dose axial images, sagittal and coronal reformations were obtained from the lung bases to the pubic symphysis following the IV administration of 75 mL of Omnipaque 350 COMPARISON: November 20, 2021 FINDINGS: The inferior aspect of the lung parenchyma shows mild interstitial scarring of the lung tissue.  The inferior aspect of the heart is unremarkable. Imaging below the diaphragm reveals that there is dilatation of the biliary channels within the liver.  The patient is status post cholecystectomy.  The common bile duct is dilated to approximately 11 mm.  There is a small area of calcification involving the inferior aspect of the left lobe of the liver.  The pancreas appears to be within normal limits.  No indication of dilatation of the pancreatic duct. The adrenal glands and spleen are within normal limits. Both kidneys feature hydronephrosis.  The right kidney features a large staghorn like calcification occupying the inferior collecting system.  Image number 81 of series 601 shows a calcification of 2.4 x 1.8 cm.  Image number 75 of series 601 and image number 91 of series 2 shows a calcification in the inferior collecting system on the left of 9.2 x 6.3 cm.  Evaluation of the more distal aspects of the ureters does not reveal evidence of a stone within the lumen of either and does not appear to be evidence of a stone within the lumen of the bladder. No indication of free air free fluid within the peritoneal cavity.  No evidence of mesenteric or retroperitoneal lymphadenopathy. The stomach, small bowel and large bowel all appear to be within normal limits.  There is stool appreciated the large bowel.  The appendix is not readily identifiable in today's examination.  The patient is status post hysterectomy.  The rectum is unremarkable.     Mild emphysematous changes of the lung parenchyma. Dilatation of the biliary channels throughout the liver parenchyma which was seen previously on the examination  obtained in November 2021. Bilateral hydronephrosis and hydroureter however there is no obvious obstructing kidney stone seen within the ureter on either side.  The level of hydronephrosis and hydroureter has increased as compared to the previous examination. Large calcifications involving the inferior collecting system of each kidney. This report was flagged in Epic as abnormal. Electronically signed by: Alfa Grant Date:    02/02/2022 Time:    09:17    CT Abdomen Pelvis  Without Contrast    Result Date: 3/1/2022  EXAMINATION: CT ABDOMEN PELVIS WITHOUT CONTRAST CLINICAL HISTORY: Abdominal pain, acute, nonlocalized; TECHNIQUE: 5 mm axial images were obtained through the abdomen and pelvis without IV contrast.  Coronal and sagittal reformats were performed. COMPARISON: CT 02/02/2022. FINDINGS: Visualized heart is normal.  No pericardial effusion. There is stable patchy ground-glass attenuation and scattered subsegmental opacities within the visualized right lung and lingula.  No pleural effusion. The liver is stable in size.  Low-attenuation lesions within the left lobe/segment 4 appears similar when compared to the recent CT.  Scattered high attenuation foci throughout the left lobe likely related to post treatment change.  There is persistent severe intrahepatic bile duct dilatation, similar to slightly progressed when compared to the previous exam. Gallbladder is surgically absent.  There is persistent dilatation of the extrahepatic bile duct with a 0.8 cm high attenuation intraluminal focus concerning for a stone. There is a small sliding-type hiatal hernia.  The stomach is otherwise decompressed and not well evaluated. Duodenum, spleen and adrenal glands are normal. There are low-attenuation lesions at the level of the pancreatic head and tail, incompletely evaluated on this exam but not significantly changed when compared to the previous CT.  No pancreatic ductal dilatation.  No peripancreatic inflammatory  changes or drainable collections. Kidneys are normal in size and location.  Right kidney demonstrates multifocal areas of cortical thinning likely related to parenchymal scarring.  Large staghorn calculus noted within the inferior pole of the right renal collecting system.  Additional bilateral nonobstructing renal stones with stone burden unchanged when compared to the previous CT.  No hydronephrosis or hydroureter.  Bladder is fluid filled and unremarkable. Uterus is surgically absent.  Right ovary demonstrates no significant abnormalities.  The left ovary is not definitely seen.  No pelvic free fluid. Postoperative change of right hemicolectomy.  Residual colon demonstrates multiple scattered diverticuli.  Small bowel is normal in caliber.  No obstruction. The abdominal aorta tapers normally without atherosclerotic calcification. No ascites or intra-abdominal free air. There is a mesenteric partially calcified soft tissue mass at the level of the right hemiabdomen corresponding with the patient's known carcinoid tumor.  Surrounding nodular soft tissue attenuation lesions likely represent enlarged, metastatic lymph nodes. There is an increased number of slightly prominent retroperitoneal lymph nodes, not significantly changed when compared to the previous CT.  Nodular area of soft tissue attenuation within the right mesorectal fascia (series 2, image 138), similar when compared to the previous exam. Surgical scar noted within the anterior midline abdominal wall with several small fat containing hernias nodular soft tissue attenuation and partially calcified foci noted within the subcutaneous soft tissues of the right anterior lower quadrant and left gluteus, likely related to previous injections. There are degenerative changes of the spine.  No suspicious lytic or blastic lesions.     1. CT findings concerning for choledocholithiasis with similar to slightly increased intra and extrahepatic bile duct dilatation.   Consider further characterization with MRCP. 2. Constellation of findings consistent with the patient's known metastatic carcinoid tumor including: *Stable partially calcified mesenteric mass with surrounding lymphadenopathy. *Multiple treated hepatic lesions. 3.  Bilateral nonobstructing renal stones with stone burden similar when compared to the previous CT. 4.  Multifocal areas of right renal cortical thinning likely representing scars. 5.  Postoperative changes of right hemicolectomy for presumed distal small bowel tumor resection. 6.  Low-attenuation pancreatic parenchymal lesions, possibly small cysts or side branch IPMNs but incompletely characterized on this exam.  Attention on follow-up MRCP recommended. 7.  Additional findings as detailed in the body of the report. Electronically signed by: Ayden Cates MD Date:    03/01/2022 Time:    13:13      Current Medications:     Infusions:       Scheduled:   atorvastatin  40 mg Oral QHS    cyanocobalamin  1,000 mcg Oral Daily    enoxaparin  40 mg Subcutaneous Daily    losartan  100 mg Oral Daily    meropenem (MERREM) IVPB  1 g Intravenous Q12H    metoprolol tartrate  25 mg Oral BID        PRN:  acetaminophen, dextrose 10%, dextrose 10%, glucagon (human recombinant), glucose, glucose, melatonin, naloxone, ondansetron, oxyCODONE, sodium chloride 0.9%

## 2022-03-03 NOTE — SUBJECTIVE & OBJECTIVE
Interval History: doing well; reports abdominal pain and dysuria both improving today. Feels better than yesterday. MRCP c/w choledocholithiasis. Discussed with GI, tentative plan for ERCP tomorrow.    Review of Systems   Constitutional:  Negative for chills and fever.   Respiratory:  Negative for shortness of breath.    Gastrointestinal:  Positive for abdominal pain.   Genitourinary:  Positive for dysuria.   Neurological:  Positive for weakness.   Objective:     Vital Signs (Most Recent):  Temp: 98.5 °F (36.9 °C) (03/03/22 1221)  Pulse: 69 (03/03/22 1222)  Resp: 18 (03/03/22 1221)  BP: 136/64 (03/03/22 1222)  SpO2: 98 % (03/03/22 1221)   Vital Signs (24h Range):  Temp:  [98.1 °F (36.7 °C)-98.7 °F (37.1 °C)] 98.5 °F (36.9 °C)  Pulse:  [55-71] 69  Resp:  [16-20] 18  SpO2:  [96 %-98 %] 98 %  BP: (132-176)/(62-81) 136/64     Weight: 83 kg (182 lb 15.7 oz)  Body mass index is 29.53 kg/m².    Intake/Output Summary (Last 24 hours) at 3/3/2022 1312  Last data filed at 3/3/2022 0900  Gross per 24 hour   Intake --   Output 400 ml   Net -400 ml        Physical Exam  Constitutional:       General: She is not in acute distress.     Appearance: Normal appearance. She is normal weight. She is not ill-appearing or toxic-appearing.   HENT:      Head: Normocephalic and atraumatic.      Mouth/Throat:      Mouth: Mucous membranes are moist.      Pharynx: Oropharynx is clear.   Eyes:      Extraocular Movements: Extraocular movements intact.      Pupils: Pupils are equal, round, and reactive to light.   Cardiovascular:      Rate and Rhythm: Normal rate and regular rhythm.      Pulses: Normal pulses.      Heart sounds: Normal heart sounds. No murmur heard.  Pulmonary:      Effort: Pulmonary effort is normal. No respiratory distress.      Breath sounds: Normal breath sounds.   Abdominal:      General: Abdomen is flat. Bowel sounds are normal.      Palpations: Abdomen is soft.      Tenderness: There is abdominal tenderness in the right  upper quadrant and suprapubic area. There is no rebound.   Musculoskeletal:      Right lower leg: No edema.      Left lower leg: No edema.   Skin:     General: Skin is warm and dry.   Neurological:      Mental Status: She is alert and oriented to person, place, and time. Mental status is at baseline.   Psychiatric:         Mood and Affect: Mood normal.         Behavior: Behavior normal.         Thought Content: Thought content normal.         Judgment: Judgment normal.       Significant Labs: All pertinent labs within the past 24 hours have been reviewed.  BMP:   Recent Labs   Lab 03/03/22  0517 03/03/22  0518   GLU 93 93    139   K 4.3 4.3    107   CO2 23 22*   BUN 19 19   CREATININE 1.1 1.1   CALCIUM 9.5 9.4   MG 2.1  --        CBC:   Recent Labs   Lab 03/02/22  0614 03/03/22  0518   WBC 4.78 4.60   HGB 9.7* 10.7*   HCT 31.9* 34.7*    241       CMP:   Recent Labs   Lab 03/02/22  0614 03/03/22  0517 03/03/22  0518    139 139   K 4.8 4.3 4.3    107 107   CO2 26 23 22*    93 93   BUN 19 19 19   CREATININE 1.2 1.1 1.1   CALCIUM 9.4 9.5 9.4   PROT 7.1  --  7.2   ALBUMIN 3.2*  --  3.2*   BILITOT 0.6  --  0.6   ALKPHOS 115  --  114   AST 18  --  13   ALT 8*  --  9*   ANIONGAP 7* 9 10   EGFRNONAA 46* 51* 51*         Significant Imaging: I have reviewed all pertinent imaging results/findings within the past 24 hours.

## 2022-03-03 NOTE — PT/OT/SLP PROGRESS
Physical Therapy      Patient Name:  Patito Domingo   MRN:  [id number]    Patient not seen today secondary to  (pt having increased abdominal pain and awaiting pain medicine,possible procedure today). Will follow-up tomorrow.

## 2022-03-03 NOTE — ASSESSMENT & PLAN NOTE
Hypertensive in ED, improved with hydralazine 10mg IV     Chronic, controlled.  Latest blood pressure and vitals reviewed-   Temp:  [98.1 °F (36.7 °C)-98.7 °F (37.1 °C)]   Pulse:  [55-71]   Resp:  [16-20]   BP: (132-176)/(62-81)   SpO2:  [96 %-98 %] .   Home meds for hypertension were reviewed and noted below.   Hypertension Medications             losartan (COZAAR) 100 MG tablet Take 1 tablet (100 mg total) by mouth once daily.    metoprolol tartrate (LOPRESSOR) 25 MG tablet TAKE 1 TABLET TWICE DAILY        While in the hospital, will manage blood pressure as follows; Continue home antihypertensive regimen

## 2022-03-03 NOTE — ASSESSMENT & PLAN NOTE
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

## 2022-03-03 NOTE — ASSESSMENT & PLAN NOTE
Patient complaining of RUQ pain     CT noted worsening intra and extrahepatic bile duct dilatation in setting of normal LFTs with concern for choledocolithiais. Previous cholecystectomy, ERCP for choledocolithiasis 11/2021   Afebrile without leukocytosis and normal lipase    General surgery consulted, no surgical intervention. GI consulted, appreciate recommendations    - Per GI,  low threshold for AB coverage if febrile or clinical decompensation, clinically not consistent with cholangitis at this time  - MRCP c/w choledocholithiasis; ERCP tentatively planned for tomorrow at [facility] [place] with octreotide per NET  - NPO@MN  - Surgery tentatively planning cytoreduction +/- choledochoduodenostomy

## 2022-03-03 NOTE — PROGRESS NOTES
IP Liaison - Initial Visit Note    Patient: Patito Domingo  MRN:  [id number]  Date of Service:  3/3/2022  Completed by:  MADHURI Matias    Reason for Visit   Patient presents with    IP Liaison Initial Visit       RSW contacted pt via room phone in order to complete SDOH questionnaire and liaison assessment.  Pt has identified no social barriers to care. Per pt, pt is not in need of resources at this time.    The following were addressed during this visit:  - Review SDOH Questions   - Complete patient assessment   - Complete initial visit with patient        Patient Summary     IP Liaison Patient Assessment    General  Level of Caregiver support: Member independent and does not need caregiver assistance  Have you had to make a decision between paying for any of the following in the last 2 months?: None  Transportation means: Family  Assessments  Was the PHQ Depression Screening completed this visit?: No  Was the MARGARITA-7 Screening completed this visit?: No         MADHURI Matias

## 2022-03-03 NOTE — ASSESSMENT & PLAN NOTE
History of UTIs. Prior cultures positive for klebsiella as well as multidrug resistant bacteria.    - Urine culture pending; hx of ESBL  - Continue IV meropenem   - ID consulted, appreciate recs

## 2022-03-03 NOTE — PROGRESS NOTES
"Per [facility] Surg meeting - pt for Endoscopy procedure tomorrow at [facility]   TN spoke    with Pat in the Transfer [facility] - everything is arranged - pt will be picked up at 11am; procedure is scheduled for 12 N then return to [place].  -- per Pat - the pt must be placed on a "leave of absence" as she can't be in Epic in two places at the same time.     TN met with pt and son Lucas Ochoa [phone].   Pt aware she is going to [facility] tomorrow at 11am for procedure with return.      TN asked pt to think about options post d/c if she needs IV abx - i.e. SNF [facility] vs. staying with a relative who can assist as needed.     "

## 2022-03-04 ENCOUNTER — ANESTHESIA EVENT (OUTPATIENT)
Dept: ENDOSCOPY | Facility: HOSPITAL | Age: 72
End: 2022-03-04
Payer: MEDICARE

## 2022-03-04 ENCOUNTER — HOSPITAL ENCOUNTER (OUTPATIENT)
Facility: HOSPITAL | Age: 72
Discharge: HOME | End: 2022-03-04
Attending: INTERNAL MEDICINE | Admitting: INTERNAL MEDICINE
Payer: MEDICARE

## 2022-03-04 ENCOUNTER — ANESTHESIA (OUTPATIENT)
Dept: ENDOSCOPY | Facility: HOSPITAL | Age: 72
End: 2022-03-04
Payer: MEDICARE

## 2022-03-04 VITALS
BODY MASS INDEX: 29.41 KG/M2 | OXYGEN SATURATION: 100 % | HEIGHT: 66 IN | DIASTOLIC BLOOD PRESSURE: 57 MMHG | HEART RATE: 69 BPM | WEIGHT: 183 LBS | RESPIRATION RATE: 18 BRPM | TEMPERATURE: 98 F | SYSTOLIC BLOOD PRESSURE: 177 MMHG

## 2022-03-04 DIAGNOSIS — K83.8 DILATION OF BILIARY TRACT: Primary | ICD-10-CM

## 2022-03-04 LAB
ALBUMIN SERPL BCP-MCNC: 3.3 G/DL (ref 3.5–5.2)
ALP SERPL-CCNC: 382 U/L (ref 55–135)
ALT SERPL W/O P-5'-P-CCNC: 62 U/L (ref 10–44)
ANION GAP SERPL CALC-SCNC: 9 MMOL/L (ref 8–16)
AST SERPL-CCNC: 275 U/L (ref 10–40)
BASOPHILS # BLD AUTO: 0.02 K/UL (ref 0–0.2)
BASOPHILS NFR BLD: 0.4 % (ref 0–1.9)
BILIRUB SERPL-MCNC: 0.8 MG/DL (ref 0.1–1)
BUN SERPL-MCNC: 19 MG/DL (ref 8–23)
CALCIUM SERPL-MCNC: 8.9 MG/DL (ref 8.7–10.5)
CHLORIDE SERPL-SCNC: 109 MMOL/L (ref 95–110)
CO2 SERPL-SCNC: 24 MMOL/L (ref 23–29)
CREAT SERPL-MCNC: 1.1 MG/DL (ref 0.5–1.4)
DIFFERENTIAL METHOD: ABNORMAL
EOSINOPHIL # BLD AUTO: 0.1 K/UL (ref 0–0.5)
EOSINOPHIL NFR BLD: 2.4 % (ref 0–8)
ERYTHROCYTE [DISTWIDTH] IN BLOOD BY AUTOMATED COUNT: 13.3 % (ref 11.5–14.5)
EST. GFR  (AFRICAN AMERICAN): 59 ML/MIN/1.73 M^2
EST. GFR  (NON AFRICAN AMERICAN): 51 ML/MIN/1.73 M^2
GLUCOSE SERPL-MCNC: 185 MG/DL (ref 70–110)
HCT VFR BLD AUTO: 33.4 % (ref 37–48.5)
HGB BLD-MCNC: 10.4 G/DL (ref 12–16)
IMM GRANULOCYTES # BLD AUTO: 0.01 K/UL (ref 0–0.04)
IMM GRANULOCYTES NFR BLD AUTO: 0.2 % (ref 0–0.5)
LYMPHOCYTES # BLD AUTO: 0.7 K/UL (ref 1–4.8)
LYMPHOCYTES NFR BLD: 16.3 % (ref 18–48)
MAGNESIUM SERPL-MCNC: 2 MG/DL (ref 1.6–2.6)
MCH RBC QN AUTO: 27.8 PG (ref 27–31)
MCHC RBC AUTO-ENTMCNC: 31.1 G/DL (ref 32–36)
MCV RBC AUTO: 89 FL (ref 82–98)
MONOCYTES # BLD AUTO: 0.4 K/UL (ref 0.3–1)
MONOCYTES NFR BLD: 9.5 % (ref 4–15)
NEUTROPHILS # BLD AUTO: 3.2 K/UL (ref 1.8–7.7)
NEUTROPHILS NFR BLD: 71.2 % (ref 38–73)
NRBC BLD-RTO: 0 /100 WBC
PHOSPHATE SERPL-MCNC: 3.3 MG/DL (ref 2.7–4.5)
PLATELET # BLD AUTO: 204 K/UL (ref 150–450)
PMV BLD AUTO: 10.2 FL (ref 9.2–12.9)
POTASSIUM SERPL-SCNC: 4.3 MMOL/L (ref 3.5–5.1)
PROT SERPL-MCNC: 6.8 G/DL (ref 6–8.4)
RBC # BLD AUTO: 3.74 M/UL (ref 4–5.4)
SODIUM SERPL-SCNC: 142 MMOL/L (ref 136–145)
WBC # BLD AUTO: 4.53 K/UL (ref 3.9–12.7)

## 2022-03-04 PROCEDURE — 25000003 PHARM REV CODE 250: Mod: HCNC | Performed by: NURSE PRACTITIONER

## 2022-03-04 PROCEDURE — 94761 N-INVAS EAR/PLS OXIMETRY MLT: CPT | Mod: HCNC

## 2022-03-04 PROCEDURE — 74328 PR  X-RAY FOR BILE DUCT ENDOSCOPY: ICD-10-PCS | Mod: 26,,, | Performed by: INTERNAL MEDICINE

## 2022-03-04 PROCEDURE — 99232 PR SUBSEQUENT HOSPITAL CARE,LEVL II: ICD-10-PCS | Mod: GC,,, | Performed by: INTERNAL MEDICINE

## 2022-03-04 PROCEDURE — 43264 PR ERCP,W/REMOVAL STONE,BIL/PANCR DUCTS: ICD-10-PCS | Mod: ,,, | Performed by: INTERNAL MEDICINE

## 2022-03-04 PROCEDURE — 84100 ASSAY OF PHOSPHORUS: CPT | Performed by: STUDENT IN AN ORGANIZED HEALTH CARE EDUCATION/TRAINING PROGRAM

## 2022-03-04 PROCEDURE — 25000003 PHARM REV CODE 250: Mod: HCNC | Performed by: NURSE ANESTHETIST, CERTIFIED REGISTERED

## 2022-03-04 PROCEDURE — 99232 SBSQ HOSP IP/OBS MODERATE 35: CPT | Mod: GC,,, | Performed by: INTERNAL MEDICINE

## 2022-03-04 PROCEDURE — 25500020 PHARM REV CODE 255: Mod: HCNC | Performed by: INTERNAL MEDICINE

## 2022-03-04 PROCEDURE — 37000009 HC ANESTHESIA EA ADD 15 MINS: Mod: HCNC | Performed by: INTERNAL MEDICINE

## 2022-03-04 PROCEDURE — 27202125 HC BALLOON, EXTRACTION (ANY): Mod: HCNC | Performed by: INTERNAL MEDICINE

## 2022-03-04 PROCEDURE — 43264 ERCP REMOVE DUCT CALCULI: CPT | Mod: ,,, | Performed by: INTERNAL MEDICINE

## 2022-03-04 PROCEDURE — 37000008 HC ANESTHESIA 1ST 15 MINUTES: Mod: HCNC | Performed by: INTERNAL MEDICINE

## 2022-03-04 PROCEDURE — 63600175 PHARM REV CODE 636 W HCPCS: Mod: HCNC | Performed by: NURSE ANESTHETIST, CERTIFIED REGISTERED

## 2022-03-04 PROCEDURE — D9220A PRA ANESTHESIA: ICD-10-PCS | Mod: HCNC,ANES,, | Performed by: ANESTHESIOLOGY

## 2022-03-04 PROCEDURE — 63600175 PHARM REV CODE 636 W HCPCS: Mod: HCNC | Performed by: ANESTHESIOLOGY

## 2022-03-04 PROCEDURE — 97116 GAIT TRAINING THERAPY: CPT | Mod: HCNC,CQ

## 2022-03-04 PROCEDURE — 43262 ENDO CHOLANGIOPANCREATOGRAPH: CPT | Mod: 59 | Performed by: INTERNAL MEDICINE

## 2022-03-04 PROCEDURE — 83735 ASSAY OF MAGNESIUM: CPT | Performed by: NURSE PRACTITIONER

## 2022-03-04 PROCEDURE — 85025 COMPLETE CBC W/AUTO DIFF WBC: CPT | Performed by: NURSE PRACTITIONER

## 2022-03-04 PROCEDURE — 11000001 HC ACUTE MED/SURG PRIVATE ROOM: Mod: HCNC

## 2022-03-04 PROCEDURE — D9220A PRA ANESTHESIA: Mod: HCNC,CRNA,, | Performed by: NURSE ANESTHETIST, CERTIFIED REGISTERED

## 2022-03-04 PROCEDURE — 63600175 PHARM REV CODE 636 W HCPCS: Mod: JA,HCNC | Performed by: STUDENT IN AN ORGANIZED HEALTH CARE EDUCATION/TRAINING PROGRAM

## 2022-03-04 PROCEDURE — 43262 ENDO CHOLANGIOPANCREATOGRAPH: CPT | Mod: 51,,, | Performed by: INTERNAL MEDICINE

## 2022-03-04 PROCEDURE — 43264 ERCP REMOVE DUCT CALCULI: CPT | Performed by: INTERNAL MEDICINE

## 2022-03-04 PROCEDURE — 43262 PR ERCP,SPHINCTEROTOMY: ICD-10-PCS | Mod: 51,,, | Performed by: INTERNAL MEDICINE

## 2022-03-04 PROCEDURE — D9220A PRA ANESTHESIA: Mod: HCNC,ANES,, | Performed by: ANESTHESIOLOGY

## 2022-03-04 PROCEDURE — 80053 COMPREHEN METABOLIC PANEL: CPT | Performed by: SURGERY

## 2022-03-04 PROCEDURE — 74328 X-RAY BILE DUCT ENDOSCOPY: CPT | Mod: 26,,, | Performed by: INTERNAL MEDICINE

## 2022-03-04 PROCEDURE — 27201674 HC SPHINCTERTOME: Mod: HCNC | Performed by: INTERNAL MEDICINE

## 2022-03-04 PROCEDURE — D9220A PRA ANESTHESIA: ICD-10-PCS | Mod: HCNC,CRNA,, | Performed by: NURSE ANESTHETIST, CERTIFIED REGISTERED

## 2022-03-04 PROCEDURE — 97110 THERAPEUTIC EXERCISES: CPT | Mod: HCNC,CQ

## 2022-03-04 PROCEDURE — 27000207 HC ISOLATION: Mod: HCNC

## 2022-03-04 PROCEDURE — C1769 GUIDE WIRE: HCPCS | Mod: HCNC | Performed by: INTERNAL MEDICINE

## 2022-03-04 PROCEDURE — 25000003 PHARM REV CODE 250: Mod: HCNC | Performed by: STUDENT IN AN ORGANIZED HEALTH CARE EDUCATION/TRAINING PROGRAM

## 2022-03-04 RX ORDER — LIDOCAINE HCL/PF 100 MG/5ML
SYRINGE (ML) INTRAVENOUS
Status: DISCONTINUED | OUTPATIENT
Start: 2022-03-04 | End: 2022-03-04

## 2022-03-04 RX ORDER — FENTANYL CITRATE 50 UG/ML
25 INJECTION, SOLUTION INTRAMUSCULAR; INTRAVENOUS EVERY 5 MIN PRN
Status: DISCONTINUED | OUTPATIENT
Start: 2022-03-04 | End: 2022-03-04 | Stop reason: HOSPADM

## 2022-03-04 RX ORDER — ONDANSETRON 2 MG/ML
4 INJECTION INTRAMUSCULAR; INTRAVENOUS DAILY PRN
Status: DISCONTINUED | OUTPATIENT
Start: 2022-03-04 | End: 2022-03-04 | Stop reason: HOSPADM

## 2022-03-04 RX ORDER — PROPOFOL 10 MG/ML
VIAL (ML) INTRAVENOUS
Status: DISCONTINUED | OUTPATIENT
Start: 2022-03-04 | End: 2022-03-04

## 2022-03-04 RX ORDER — PROPOFOL 10 MG/ML
VIAL (ML) INTRAVENOUS CONTINUOUS PRN
Status: DISCONTINUED | OUTPATIENT
Start: 2022-03-04 | End: 2022-03-04

## 2022-03-04 RX ORDER — FENTANYL CITRATE 50 UG/ML
INJECTION, SOLUTION INTRAMUSCULAR; INTRAVENOUS
Status: DISCONTINUED | OUTPATIENT
Start: 2022-03-04 | End: 2022-03-04

## 2022-03-04 RX ORDER — PROCHLORPERAZINE EDISYLATE 5 MG/ML
5 INJECTION INTRAMUSCULAR; INTRAVENOUS EVERY 30 MIN PRN
Status: DISCONTINUED | OUTPATIENT
Start: 2022-03-04 | End: 2022-03-04 | Stop reason: HOSPADM

## 2022-03-04 RX ORDER — HYDROMORPHONE HYDROCHLORIDE 1 MG/ML
0.2 INJECTION, SOLUTION INTRAMUSCULAR; INTRAVENOUS; SUBCUTANEOUS EVERY 5 MIN PRN
Status: DISCONTINUED | OUTPATIENT
Start: 2022-03-04 | End: 2022-03-04 | Stop reason: HOSPADM

## 2022-03-04 RX ADMIN — HYDROMORPHONE HYDROCHLORIDE 0.2 MG: 1 INJECTION, SOLUTION INTRAMUSCULAR; INTRAVENOUS; SUBCUTANEOUS at 04:03

## 2022-03-04 RX ADMIN — Medication 100 MG: at 03:03

## 2022-03-04 RX ADMIN — OCTREOTIDE ACETATE 250 MCG/HR: 1000 INJECTION, SOLUTION INTRAVENOUS; SUBCUTANEOUS at 08:03

## 2022-03-04 RX ADMIN — FENTANYL CITRATE 50 MCG: 50 INJECTION, SOLUTION INTRAMUSCULAR; INTRAVENOUS at 02:03

## 2022-03-04 RX ADMIN — PROPOFOL 30 MG: 10 INJECTION, EMULSION INTRAVENOUS at 03:03

## 2022-03-04 RX ADMIN — PROPOFOL 150 MCG/KG/MIN: 10 INJECTION, EMULSION INTRAVENOUS at 03:03

## 2022-03-04 RX ADMIN — METOPROLOL TARTRATE 25 MG: 25 TABLET, FILM COATED ORAL at 09:03

## 2022-03-04 RX ADMIN — METOPROLOL TARTRATE 25 MG: 25 TABLET, FILM COATED ORAL at 08:03

## 2022-03-04 RX ADMIN — LOSARTAN POTASSIUM 100 MG: 50 TABLET, FILM COATED ORAL at 09:03

## 2022-03-04 RX ADMIN — OCTREOTIDE ACETATE 250 MCG/HR: 1000 INJECTION, SOLUTION INTRAVENOUS; SUBCUTANEOUS at 05:03

## 2022-03-04 RX ADMIN — OXYCODONE 15 MG: 5 TABLET ORAL at 07:03

## 2022-03-04 RX ADMIN — OXYCODONE 15 MG: 5 TABLET ORAL at 11:03

## 2022-03-04 RX ADMIN — ATORVASTATIN CALCIUM 40 MG: 40 TABLET, FILM COATED ORAL at 09:03

## 2022-03-04 RX ADMIN — PROPOFOL 70 MG: 10 INJECTION, EMULSION INTRAVENOUS at 03:03

## 2022-03-04 RX ADMIN — CYANOCOBALAMIN TAB 1000 MCG 1000 MCG: 1000 TAB at 09:03

## 2022-03-04 RX ADMIN — ACETAMINOPHEN 650 MG: 325 TABLET ORAL at 11:03

## 2022-03-04 RX ADMIN — OCTREOTIDE ACETATE 250 MCG: 500 INJECTION, SOLUTION INTRAVENOUS; SUBCUTANEOUS at 04:03

## 2022-03-04 RX ADMIN — SODIUM CHLORIDE: 0.9 INJECTION, SOLUTION INTRAVENOUS at 02:03

## 2022-03-04 RX ADMIN — IOHEXOL 10 ML: 300 INJECTION, SOLUTION INTRAVENOUS at 03:03

## 2022-03-04 NOTE — NURSING
Patient iv infiltrated was d/c patient refusing to have new iv placed at this time states she want to get a iv when she get her procedure so she can be sleep when they do it per patient

## 2022-03-04 NOTE — ASSESSMENT & PLAN NOTE
Patient complaining of RUQ pain     CT noted worsening intra and extrahepatic bile duct dilatation in setting of normal LFTs with concern for choledocolithiais. Previous cholecystectomy, ERCP for choledocolithiasis 11/2021   Afebrile without leukocytosis and normal lipase    General surgery consulted, no surgical intervention. GI consulted, appreciate recommendations    - Per GI,  low threshold for AB coverage if febrile or clinical decompensation, clinically not consistent with cholangitis at this time  - MRCP c/w choledocholithiasis; ERCP planned for today at [facility] [place] with octreotide per NET  - NPO@MN  - Surgery tentatively planning cytoreduction +/- choledochoduodenostomy

## 2022-03-04 NOTE — PT/OT/SLP PROGRESS
"Physical Therapy Treatment    Patient Name:  Patito Domingo   MRN:  [id number]    Recommendations:     Discharge Recommendations:  home health PT, home health OT   Discharge Equipment Recommendations: none   Barriers to discharge: None    Assessment:     Patito Domingo is a 71 y.o. female admitted with a medical diagnosis of Choledocholithiasis.  She presents with the following impairments/functional limitations:  weakness, impaired endurance, impaired functional mobilty, gait instability, impaired balance, decreased coordination, decreased lower extremity function, decreased safety awareness, decreased ROM, impaired coordination, pain. Pt able to perform 2 trials of ambulation training ~25-30 ft each with personal rollator requiring Supervision. Prior to movement pt with complaints of 10/10 back pain which she did not rate after ambulation. Would benefit from continued PT services to increase pt's independent functional mobility to level prior to admission.    Rehab Prognosis: Good; patient would benefit from acute skilled PT services to address these deficits and reach maximum level of function.    Recent Surgery: * No surgery found *      Plan:     During this hospitalization, patient to be seen 2 x/week to address the identified rehab impairments via gait training, therapeutic activities, therapeutic exercises and progress toward the following goals:    · Plan of Care Expires:  04/02/22    Subjective     Chief Complaint: none Expressed  Patient/Family Comments/goals: "My back hurts".  Pain/Comfort:  · Pain Rating 1: 10/10  · Location - Orientation 1: generalized  · Location 1: back  · Pain Addressed 1: Distraction, Reposition  · Pain Rating Post-Intervention 1:  (Did not rate)      Objective:     Communicated with nurse prior to session.  Patient found supine with bed alarm, peripheral IV upon PT entry to room.     General Precautions: Standard, contact, fall   Orthopedic Precautions:N/A   Braces: " N/A  Respiratory Status: Room air     Functional Mobility:  · Bed Mobility:     · Rolling Left:  modified independence and supervision  · Scooting: modified independence and supervision  · Supine to Sit: supervision  · Sit to Supine: supervision  · Transfers:     · Sit to Stand:  supervision with  Personal rollator  · Gait:  2 trials of ~25-30 ft each with personal rollator requiring supervision      AM-PAC 6 CLICK MOBILITY  Turning over in bed (including adjusting bedclothes, sheets and blankets)?: 4  Sitting down on and standing up from a chair with arms (e.g., wheelchair, bedside commode, etc.): 3  Moving from lying on back to sitting on the side of the bed?: 4  Moving to and from a bed to a chair (including a wheelchair)?: 3  Need to walk in hospital room?: 3  Climbing 3-5 steps with a railing?: 3  Basic Mobility Total Score: 20       Therapeutic Activities and Exercises:   Prior to movement pt with 10/10 back pain. Despite this pt able to perform 2 ambulation trials ~25-30 ft each with personal rollator requiring supervision. Required verbal/tactile cueing to decrease trunk flexion throughout ambulation trials. After 1st trial pt ambulated to in room bathroom and able to perform self hygiene when finished. Performed 1 x 10 glut isometrics and 1 x 10 LAQ's and hip add/abd BLE's. Pt returned to supine awaiting transfer to [facility].    Patient left supine with all lines intact, call button in reach, bed alarm on and  nurse notified..    GOALS:   Multidisciplinary Problems     Physical Therapy Goals        Problem: Physical Therapy Goal    Goal Priority Disciplines Outcome Goal Variances Interventions   Physical Therapy Goal     PT, PT/OT Ongoing, Progressing     Description: Goals to be met by: 22     Patient will increase functional independence with mobility by performin. Supine <> sit with Modified [place]  2. Sit to stand transfer with Modified [place]  3. Bed to chair transfer with  Modified [place]   4. Gait  x 100 feet with Supervision   5. Lower extremity exercise program x 10 reps per handout, with supervision                     Time Tracking:     PT Received On: 03/04/22  PT Start Time: 0832     PT Stop Time: 0900  PT Total Time (min): 28 min     Billable Minutes: Gait Training   15, Therapeutic Activity  3 (Unbillable) and Therapeutic Exercise  10    Treatment Type: Treatment  PT/PTA: PTA     PTA Visit Number: 1     03/04/2022

## 2022-03-04 NOTE — PLAN OF CARE
Patient transported via [facility] via stretcher to [facility]. Paperwork sent with transporters and transporters educated on information to relay to receiving facility. Verbalized understanding from transporters and patient prior to discharge.

## 2022-03-04 NOTE — PLAN OF CARE
Problem: Physical Therapy Goal  Goal: Physical Therapy Goal  Description: Goals to be met by: 22     Patient will increase functional independence with mobility by performin. Supine <> sit with Modified [place]  2. Sit to stand transfer with Modified [place]  3. Bed to chair transfer with Modified [place]   4. Gait  x 100 feet with Supervision   5. Lower extremity exercise program x 10 reps per handout, with supervision    3/4/2022 1148 by Jodie Grissom, PTA  Outcome: Ongoing, Progressing   Pt continues to work toward all goals. Able to perform 2 ambulation trials ~25-30 ft each with personal rollator requiring Sup.

## 2022-03-04 NOTE — PROGRESS NOTES
Pharmacist Renal Dose Adjustment Note    Patito Domingo is a 71 y.o. female being treated with the medication meropenem    Patient Data:    Vital Signs (Most Recent):  Temp: 98.4 °F (36.9 °C) (03/04/22 0740)  Pulse: 75 (03/04/22 0740)  Resp: 18 (03/04/22 0740)  BP: (!) 151/74 (03/04/22 0740)  SpO2: 98 % (03/04/22 0812)   Vital Signs (72h Range):  Temp:  [97.3 °F (36.3 °C)-98.9 °F (37.2 °C)]   Pulse:  [55-94]   Resp:  [16-89]   BP: (119-213)/(58-92)   SpO2:  [96 %-100 %]      Recent Labs   Lab 03/02/22  0614 03/03/22  0517 03/03/22  0518   CREATININE 1.2 1.1 1.1     Serum creatinine: 1.1 mg/dL 03/03/22 0518  Estimated creatinine clearance: 52.1 mL/min    Medication:Meropenem dose: 1g frequency q 12 hours will be changed to medication:meropenem dose:1g frequency:q 8 hours    Pharmacist's Name: Mariela Allan  Pharmacist's Extension: [phone]

## 2022-03-04 NOTE — SUBJECTIVE & OBJECTIVE
Interval History: abdominal pain is mild but still present. Discussed ERCP today with the patient. No fevers or chills. States that the octreotide made her stomach upset.    Review of Systems   Constitutional:  Negative for chills and fever.   Respiratory:  Negative for shortness of breath.    Gastrointestinal:  Positive for abdominal pain.   Genitourinary:  Positive for dysuria.   Neurological:  Positive for weakness.   Objective:     Vital Signs (Most Recent):  Temp: 98.4 °F (36.9 °C) (03/04/22 0740)  Pulse: 75 (03/04/22 0740)  Resp: 18 (03/04/22 0740)  BP: (!) 151/74 (03/04/22 0740)  SpO2: 98 % (03/04/22 0812)   Vital Signs (24h Range):  Temp:  [97.3 °F (36.3 °C)-98.6 °F (37 °C)] 98.4 °F (36.9 °C)  Pulse:  [56-80] 75  Resp:  [16-20] 18  SpO2:  [96 %-98 %] 98 %  BP: (119-160)/(58-74) 151/74     Weight: 82.1 kg (181 lb)  Body mass index is 29.21 kg/m².    Intake/Output Summary (Last 24 hours) at 3/4/2022 1000  Last data filed at 3/3/2022 2305  Gross per 24 hour   Intake 240 ml   Output 1300 ml   Net -1060 ml        Physical Exam  Constitutional:       General: She is not in acute distress.     Appearance: Normal appearance. She is normal weight. She is not ill-appearing or toxic-appearing.   HENT:      Head: Normocephalic and atraumatic.      Mouth/Throat:      Mouth: Mucous membranes are moist.      Pharynx: Oropharynx is clear.   Eyes:      Extraocular Movements: Extraocular movements intact.      Pupils: Pupils are equal, round, and reactive to light.   Cardiovascular:      Rate and Rhythm: Normal rate and regular rhythm.      Pulses: Normal pulses.      Heart sounds: Normal heart sounds. No murmur heard.  Pulmonary:      Effort: Pulmonary effort is normal. No respiratory distress.      Breath sounds: Normal breath sounds.   Abdominal:      General: Abdomen is flat. Bowel sounds are normal.      Palpations: Abdomen is soft.      Tenderness: There is abdominal tenderness in the right upper quadrant and suprapubic  area. There is no rebound.   Musculoskeletal:      Right lower leg: No edema.      Left lower leg: No edema.   Skin:     General: Skin is warm and dry.   Neurological:      Mental Status: She is alert and oriented to person, place, and time. Mental status is at baseline.   Psychiatric:         Mood and Affect: Mood normal.         Behavior: Behavior normal.         Thought Content: Thought content normal.         Judgment: Judgment normal.       Significant Labs: All pertinent labs within the past 24 hours have been reviewed.  BMP:   Recent Labs   Lab 03/03/22 0517 03/03/22 0518   GLU 93 93    139   K 4.3 4.3    107   CO2 23 22*   BUN 19 19   CREATININE 1.1 1.1   CALCIUM 9.5 9.4   MG 2.1  --        CBC:   Recent Labs   Lab 03/03/22 0518   WBC 4.60   HGB 10.7*   HCT 34.7*          CMP:   Recent Labs   Lab 03/03/22 0517 03/03/22 0518    139   K 4.3 4.3    107   CO2 23 22*   GLU 93 93   BUN 19 19   CREATININE 1.1 1.1   CALCIUM 9.5 9.4   PROT  --  7.2   ALBUMIN  --  3.2*   BILITOT  --  0.6   ALKPHOS  --  114   AST  --  13   ALT  --  9*   ANIONGAP 9 10   EGFRNONAA 51* 51*         Significant Imaging: I have reviewed all pertinent imaging results/findings within the past 24 hours.

## 2022-03-04 NOTE — H&P
Short Stay Endoscopy History and Physical    PCP - Yasir Myers Jr, MD  Referring Physician - Roby Virgen MD  [address]  [address]  [place] [address]    Procedure - ercp  ASA - per anesthesia  Mallampati - per anesthesia  History of Anesthesia problems - no  Family history Anesthesia problems -  no   Plan of anesthesia - General    HPI:  This is a 71 y.o. 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    Surgical History:  has a past surgical history that includes Liver biopsy (); Lithotripsy; cystoscope; Hysterectomy (1996); Colon surgery; Eye surgery; Cataract extraction (Left, 10/2017); Cholecystectomy; Cystoscopy w/ retrogrades (Right, 10/10/2019); Ureteroscopy (Right, 10/10/2019);  section; Uterine fibroid surgery; Abdominal surgery; and ERCP (N/A, 2021).    Family History: family history includes Alzheimer's disease in her father; Cancer in her mother; No Known Problems in her son, son, son, and son; Stroke in her sister..    Social History:  reports that she has never smoked. She has never used smokeless tobacco. She reports that she does not drink alcohol and does not use drugs.    Review of patient's allergies indicates:   Allergen Reactions    Contrast media Hives, Itching and Swelling    Epinephrine Anaphylaxis     Can cause  a Carcinoid Crisis    Ibuprofen Hives, Itching and Swelling    Iodinated contrast media      Sulfa (sulfonamide antibiotics) Hives, Itching and Swelling       Medications:   Medications Prior to Admission   Medication Sig Dispense Refill Last Dose    alcohol swabs (BD ALCOHOL SWABS) PadM Apply 1 each topically as needed. 90 each 0     atorvastatin (LIPITOR) 40 MG tablet Take 1 tablet (40 mg total) by mouth every evening. 90 tablet 3     BLOOD PRESSURE CUFF Misc 1 each by Misc.(Non-Drug; Combo Route) route once daily. 1 each 0     cyanocobalamin (VITAMIN B-12) 1000 MCG tablet Take 1 tablet (1,000 mcg total) by mouth once daily. 30 tablet 5     losartan (COZAAR) 100 MG tablet Take 1 tablet (100 mg total) by mouth once daily. 90 tablet 0     magnesium oxide (MAG-OX) 400 mg (241.3 mg magnesium) tablet Take 1 tablet (400 mg total) by mouth 2 (two) times daily. 60 tablet 1     meclizine (ANTIVERT) 25 mg tablet TAKE 1 TABLET(25 MG) BY MOUTH THREE TIMES DAILY AS NEEDED FOR DIZZINESS (Patient taking differently: Take 25 mg by mouth 3 (three) times daily as needed for Dizziness.) 30 tablet 0     metoprolol tartrate (LOPRESSOR) 25 MG tablet TAKE 1 TABLET TWICE DAILY (Patient taking differently: Take 25 mg by mouth 2 (two) times daily.) 180 tablet 0     oxyCODONE (ROXICODONE) 15 MG Tab Take 15 mg by mouth every 4 (four) hours as needed (chronic abdominal pain, malignancy related).          Physical Exam:    Vital Signs:   Vitals:    03/04/22 1245   BP: (!) 197/77   Pulse: 69   Resp: 16   Temp: 98.1 °F (36.7 °C)       General Appearance: Well appearing in no acute distress    Labs:  Lab Results   Component Value Date    WBC 4.60 03/03/2022    HGB 10.7 (L) 03/03/2022    HCT 34.7 (L) 03/03/2022     03/03/2022    CHOL 137 03/08/2021    TRIG 77 03/08/2021    HDL 73 03/08/2021    ALT 9 (L) 03/03/2022    AST 13 03/03/2022     03/03/2022    K 4.3 03/03/2022     03/03/2022    CREATININE 1.1 03/03/2022    BUN 19 03/03/2022    CO2 22 (L) 03/03/2022    TSH 1.458 02/02/2022    INR 1.0 03/02/2022     HGBA1C 6.2 (H) 03/08/2021       I have explained the risks and benefits of this endoscopic procedure to the patient including but not limited to bleeding, inflammation, infection, perforation, and death.      Roby Virgen MD

## 2022-03-04 NOTE — PROGRESS NOTES
Neuroendocrine Surgery Progress Note  Admit Date: 3/1/2022  Hospital Day: 1  Procedure:   none    S:  Improved dysuria  Moderate pain this AM, controlled with meds  Ambulating with walker to restroom  abx on board    O:  Vitals:   Temp:  [97.3 °F (36.3 °C)-98.6 °F (37 °C)]   Pulse:  [56-80]   Resp:  [16-20]   BP: (119-160)/(58-74)   SpO2:  [96 %-98 %]     Diet NPO   Intake/Output Summary (Last 24 hours) at 3/4/2022 0719  Last data filed at 3/3/2022 2305  Gross per 24 hour   Intake 240 ml   Output 1700 ml   Net -1460 ml            Physical Exam:  NAD  RR  No increased WOB, room air   Abdomen soft, non distended. Tender in mid right upper and lower abdomen. Well healed midline and open leslie scars      Labs:  Recent Labs     03/01/22  1147 03/02/22  0614 03/03/22  0102 03/03/22  0517 03/03/22  0518   WBC 5.87 4.78  --   --  4.60   HGB 10.6* 9.7*  --   --  10.7*   HCT 34.3* 31.9*  --   --  34.7*    253  --   --  241    139  --  139 139   K 4.7 4.8  --  4.3 4.3    106  --  107 107   CO2 21* 26  --  23 22*   BUN 16 19  --  19 19   CREATININE 1.0 1.2  --  1.1 1.1   BILITOT 0.5 0.6  --   --  0.6   AST 12 18  --   --  13   ALT 8* 8*  --   --  9*   ALKPHOS 132 115  --   --  114   CALCIUM 9.3 9.4  --  9.5 9.4   ALBUMIN 3.6 3.2*  --   --  3.2*   PROT 7.3 7.1  --   --  7.2   MG  --  1.9  --  2.1  --    PHOS  --   --  3.5  --   --      Ucx: hafnia alvei    Scheduled Meds: atorvastatin, 40 mg, QHS  cyanocobalamin, 1,000 mcg, Daily  enoxaparin, 40 mg, Daily  losartan, 100 mg, Daily  meropenem (MERREM) IVPB, 1 g, Q12H  metoprolol tartrate, 25 mg, BID       octreotide infusion (50 mcg/mL) 250 mcg/hr (03/04/22 2345)       A/P:  71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 No leukocytosis, elevated bilirubin or LFTs to suggest acute abdominal etiology. MRCP showing 8mm stone. Suspected  liver mass corresponding to abdominal pain. Hafnia alvei in urine cx sensitive to meropenem. Pending ERCP today at [facility] with return to OM    - daily CMPs  - currently on octreotide prior to ERCP at [facility]  - continue UTI management per primary team. ID on board  - recommend PET scan at discharge    Kalina Turner MD  [facility] HO[facility]

## 2022-03-04 NOTE — PROVATION PATIENT INSTRUCTIONS
Discharge Summary/Instructions after an Endoscopic Procedure  Patient Name: Patito Domingo  Patient MRN: [id number]  Patient YOB: 1950 Friday, March 4, 2022  Roby Virgen MD  Dear patient,  As a result of recent federal legislation (The Federal Cures Act), you may   receive lab or pathology results from your procedure in your [facility]   account before your physician is able to contact you. Your physician or   their representative will relay the results to you with their   recommendations at their soonest availability.  Thank you,  RESTRICTIONS:  During your procedure today, you received medications for sedation.  These   medications may affect your judgment, balance and coordination.  Therefore,   for 24 hours, you have the following restrictions:   - DO NOT drive a car, operate machinery, make legal/financial decisions,   sign important papers or drink alcohol.    ACTIVITY:  Today: no heavy lifting, straining or running due to procedural   sedation/anesthesia.  The following day: return to full activity including work.  DIET:  Eat and drink normally unless instructed otherwise.     TREATMENT FOR COMMON SIDE EFFECTS:  - Mild abdominal pain, nausea, belching, bloating or excessive gas:  rest,   eat lightly and use a heating pad.  - Sore Throat: treat with throat lozenges and/or gargle with warm salt   water.  - Because air was used during the procedure, expelling large amounts of air   from your rectum or belching is normal.  - If a bowel prep was taken, you may not have a bowel movement for 1-3 days.    This is normal.  SYMPTOMS TO WATCH FOR AND REPORT TO YOUR PHYSICIAN:  1. Abdominal pain or bloating, other than gas cramps.  2. Chest pain.  3. Back pain.  4. Signs of infection such as: chills or fever occurring within 24 hours   after the procedure.  5. Rectal bleeding, which would show as bright red, maroon, or black stools.   (A tablespoon of blood from the rectum is not serious, especially if    hemorrhoids are present.)  6. Vomiting.  7. Weakness or dizziness.  GO DIRECTLY TO THE NEAREST EMERGENCY ROOM IF YOU HAVE ANY OF THE FOLLOWING:      Difficulty breathing              Chills and/or fever over 101 F   Persistent vomiting and/or vomiting blood   Severe abdominal pain   Severe chest pain   Black, tarry stools   Bleeding- more than one tablespoon   Any other symptom or condition that you feel may need urgent attention  Your doctor recommends these additional instructions:  If any biopsies were taken, your doctors clinic will contact you in 1 to 2   weeks with any results.  - Return patient to [facility] for ongoing care.   - Resume previous diet.   - Continue present medications.   - Return to referring physician at appointment to be scheduled.  For questions, problems or results please call your physician - Roby Virgen MD at Work:  [phone].  [facility], EMERGENCY ROOM PHONE NUMBER: [phone]  IF A COMPLICATION OR EMERGENCY SITUATION ARISES AND YOU ARE UNABLE TO REACH   YOUR PHYSICIAN - GO DIRECTLY TO THE EMERGENCY ROOM.  Roby Virgen MD  3/4/2022 3:26:07 PM  This report has been verified and signed electronically.  Dear patient,  As a result of recent federal legislation (The Federal Cures Act), you may   receive lab or pathology results from your procedure in your [facility]   account before your physician is able to contact you. Your physician or   their representative will relay the results to you with their   recommendations at their soonest availability.  Thank you,  PROVATION

## 2022-03-04 NOTE — ASSESSMENT & PLAN NOTE
History of UTIs. Prior cultures positive for klebsiella as well as multidrug resistant bacteria.    - Urine culture pending; hx of ESBL  - Continue IV meropenem for now; deescalate pending cultures  - ID consulted, appreciate recs

## 2022-03-04 NOTE — PROGRESS NOTES
"[facility] Medicine  Progress Note    Patient Name: Patito Domingo  MRN: [id number]  Patient Class: IP- Inpatient   Admission Date: 3/1/2022  Length of Stay: 1 days  Attending Physician: Eleno Osborne, *  Primary Care Provider: Yasir Myers Jr, MD        Subjective:     Principal Problem:Choledocholithiasis        HPI:  Patito Domingo is a 70 yo female with a pmh of cholecystectomy, colon cancer, liver tumor, CVA, HTN, frequent UTI. She presented with abdominal pain that started yesterday evening. She described right sided abdominal pain into the RUQ that is worse when she bends forward. She denies fevers but does report chills yesterday. She takes narcotics for chronic pain due to colon cancer but states this pain was different than normal. She reports having her gall bladder removed a few years ago. No change in appetite. She also reports vaginal irritation and dysuria which has been ongoing. She was admitted here earlier this month for a UTI, treated with merrem. She is followed by urology outpatient. She had a UA done on 2/23/22 due to urinary symptoms and found to be positive for UTI. She was prescribed macrobid but did not take it because 'it does not work". Today in the ED, she was found to have a UTI. CT abdomen with concern for choledocholithiasis with similar to slightly increased intra and extrahepatic bile duct dilatation. The patient was given a dose of macrobid in the ED and neuroendocrine was consulted per ED provider. She was also hypertensive on arrival and was given hydralazine IV. No leukocytosis or  elevated LFTs on arrival.       Overview/Hospital Course:  Patient is 71 year old female admitted for choledocholithiasis and UTI. Started on IV meropenem. ID consulted for recommendations on duration of antibiotic course given history of recurrent UTIs. GI consulted, plan for MRCP. General surgery and neuroendocrine following, no surgical intervention planned. "       Interval History: abdominal pain is mild but still present. Discussed ERCP today with the patient. No fevers or chills. States that the octreotide made her stomach upset.    Review of Systems   Constitutional:  Negative for chills and fever.   Respiratory:  Negative for shortness of breath.    Gastrointestinal:  Positive for abdominal pain.   Genitourinary:  Positive for dysuria.   Neurological:  Positive for weakness.   Objective:     Vital Signs (Most Recent):  Temp: 98.4 °F (36.9 °C) (03/04/22 0740)  Pulse: 75 (03/04/22 0740)  Resp: 18 (03/04/22 0740)  BP: (!) 151/74 (03/04/22 0740)  SpO2: 98 % (03/04/22 0812)   Vital Signs (24h Range):  Temp:  [97.3 °F (36.3 °C)-98.6 °F (37 °C)] 98.4 °F (36.9 °C)  Pulse:  [56-80] 75  Resp:  [16-20] 18  SpO2:  [96 %-98 %] 98 %  BP: (119-160)/(58-74) 151/74     Weight: 82.1 kg (181 lb)  Body mass index is 29.21 kg/m².    Intake/Output Summary (Last 24 hours) at 3/4/2022 1000  Last data filed at 3/3/2022 2305  Gross per 24 hour   Intake 240 ml   Output 1300 ml   Net -1060 ml        Physical Exam  Constitutional:       General: She is not in acute distress.     Appearance: Normal appearance. She is normal weight. She is not ill-appearing or toxic-appearing.   HENT:      Head: Normocephalic and atraumatic.      Mouth/Throat:      Mouth: Mucous membranes are moist.      Pharynx: Oropharynx is clear.   Eyes:      Extraocular Movements: Extraocular movements intact.      Pupils: Pupils are equal, round, and reactive to light.   Cardiovascular:      Rate and Rhythm: Normal rate and regular rhythm.      Pulses: Normal pulses.      Heart sounds: Normal heart sounds. No murmur heard.  Pulmonary:      Effort: Pulmonary effort is normal. No respiratory distress.      Breath sounds: Normal breath sounds.   Abdominal:      General: Abdomen is flat. Bowel sounds are normal.      Palpations: Abdomen is soft.      Tenderness: There is abdominal tenderness in the right upper quadrant and  suprapubic area. There is no rebound.   Musculoskeletal:      Right lower leg: No edema.      Left lower leg: No edema.   Skin:     General: Skin is warm and dry.   Neurological:      Mental Status: She is alert and oriented to person, place, and time. Mental status is at baseline.   Psychiatric:         Mood and Affect: Mood normal.         Behavior: Behavior normal.         Thought Content: Thought content normal.         Judgment: Judgment normal.       Significant Labs: All pertinent labs within the past 24 hours have been reviewed.  BMP:   Recent Labs   Lab 03/03/22 0517 03/03/22 0518   GLU 93 93    139   K 4.3 4.3    107   CO2 23 22*   BUN 19 19   CREATININE 1.1 1.1   CALCIUM 9.5 9.4   MG 2.1  --        CBC:   Recent Labs   Lab 03/03/22 0518   WBC 4.60   HGB 10.7*   HCT 34.7*          CMP:   Recent Labs   Lab 03/03/22 0517 03/03/22 0518    139   K 4.3 4.3    107   CO2 23 22*   GLU 93 93   BUN 19 19   CREATININE 1.1 1.1   CALCIUM 9.5 9.4   PROT  --  7.2   ALBUMIN  --  3.2*   BILITOT  --  0.6   ALKPHOS  --  114   AST  --  13   ALT  --  9*   ANIONGAP 9 10   EGFRNONAA 51* 51*         Significant Imaging: I have reviewed all pertinent imaging results/findings within the past 24 hours.      Assessment/Plan:      * Choledocholithiasis  Patient complaining of RUQ pain     CT noted worsening intra and extrahepatic bile duct dilatation in setting of normal LFTs with concern for choledocolithiais. Previous cholecystectomy, ERCP for choledocolithiasis 11/2021   Afebrile without leukocytosis and normal lipase    General surgery consulted, no surgical intervention. GI consulted, appreciate recommendations    - Per GI,  low threshold for AB coverage if febrile or clinical decompensation, clinically not consistent with cholangitis at this time  - MRCP c/w choledocholithiasis; ERCP planned for today at [facility] with octreotide per NET  - NPO@MN  - Surgery tentatively planning cytoreduction +/-  choledochoduodenostomy    Dilation of biliary tract  See choledocholithiasis    Chronic diastolic heart failure with preserved ejection fraction  No acute symptoms indicative of HF exacerbation.  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  Latest blood pressure and vitals reviewed-   Temp:  [97.3 °F (36.3 °C)-98.6 °F (37 °C)]   Pulse:  [56-80]   Resp:  [16-20]   BP: (119-160)/(58-74)   SpO2:  [96 %-98 %] .   Home meds for hypertension were reviewed and noted below.   Hypertension Medications             losartan (COZAAR) 100 MG tablet Take 1 tablet (100 mg total) by mouth once daily.    metoprolol tartrate (LOPRESSOR) 25 MG tablet TAKE 1 TABLET TWICE DAILY        While in the hospital, will manage blood pressure as follows; Continue home antihypertensive regimen    Chronic pain syndrome  History of colon cancer, followed by oncologist. Reports talking oxycodone at home    - Continue home dose oxycodone 15 mg q4 hrs PRN    Urinary tract infection without hematuria  History of UTIs. Prior cultures positive for klebsiella as well as multidrug resistant bacteria.    - Urine culture pending; hx of ESBL  - Continue IV meropenem for now; deescalate pending cultures  - ID consulted, appreciate  recs    Nephrolithiasis  CT abdomen/pelvis showed right kidney with multifocal areas of cortical thinning likely related to parenchymal scarring, karge staghorn calculus noted within the inferior pole of the right renal collecting system, and additional bilateral nonobstructing renal stones with stone burden unchanged when compared to the previous CT without hydronephrosis or hydroureter    Patient reports regularly seeing her urologist with next appointment 3/15/22    - Will follow up with urologist as an outpatient    Neuroendocrine carcinoma, unknown primary site          VTE Risk Mitigation (From admission, onward)         Ordered     [MAR Hold - Suspended Admission]  enoxaparin injection 40 mg  Daily        (MAR Hold at [facility] since Fri 3/4/2022 at 1137.Hold Reason: Patient on Leave of Absence.)    03/01/22 2321     [Order Hold - Suspended Admission]  IP VTE HIGH RISK PATIENT  Once        (On hold at [facility] since 03/04/22 1135)    03/01/22 1635     [Order Hold - Suspended Admission]  Place sequential compression device  Until discontinued        (On hold at [facility] since 03/04/22 1135)    03/01/22 1635                Discharge Planning   JULIO C: 3/7/2022     Code Status: Full Code   Is the patient medically ready for discharge?:     Reason for patient still in hospital (select all that apply): Treatment                     Eleno Osborne MD  Department of Hospital Medicine   [facility] Surg

## 2022-03-04 NOTE — ANESTHESIA PREPROCEDURE EVALUATION
2022  Patito Domingo is a 71 y.o., female.  Pre-operative evaluation for ERCP (ENDOSCOPIC RETROGRADE CHOLANGIOPANCREATOGRAPHY) (N/A)    Chief Complaint: dilated bile ducts, intra and extra hepatic  HPI:  admitted for choledocholithiasis and UTI.   PMH:  HTN, HLD, hx of nephrolithiasis, carcinoid of colon with liver mets s/p TACE, and recurrent UTIs     Past Surgical History:   Procedure Laterality Date    ABDOMINAL SURGERY      CATARACT EXTRACTION Left 10/2017     SECTION      CHOLECYSTECTOMY      COLON SURGERY      cystoscope      CYSTOSCOPY W/ RETROGRADES Right 10/10/2019    Procedure: CYSTOSCOPY, WITH RETROGRADE PYELOGRAM;  Surgeon: Gen Isbell MD;  Location: [facility] OR;  Service: Urology;  Laterality: Right;    ERCP N/A 2021    Procedure: ERCP (ENDOSCOPIC RETROGRADE CHOLANGIOPANCREATOGRAPHY);  Surgeon: Jayjay Rivera MD;  Location: [address]);  Service: Endoscopy;  Laterality: N/A;    EYE SURGERY      HYSTERECTOMY  1996    LITHOTRIPSY      LIVER BIOPSY      carcinoid    URETEROSCOPY Right 10/10/2019    Procedure: URETEROSCOPY;  Surgeon: Gen Isbell MD;  Location: [facility] OR;  Service: Urology;  Laterality: Right;    UTERINE FIBROID SURGERY           Vital Signs Range (Last 24H):  Temp:  [36.3 °C (97.3 °F)-37 °C (98.6 °F)]   Pulse:  [56-80]   Resp:  [16-20]   BP: (119-160)/(58-74)   SpO2:  [96 %-98 %]       CBC:     Recent Labs   Lab 22  1147 22  0622  0518   WBC 5.87 4.78 4.60   RBC 3.91* 3.63* 3.96*   HGB 10.6* 9.7* 10.7*   HCT 34.3* 31.9* 34.7*    253 241   MCV 88 88 88   MCH 27.1 26.7* 27.0   MCHC 30.9* 30.4* 30.8*       CMP:   Recent Labs   Lab 22  1147 22  0614 22  0102 22  0517 22  0518    139  --  139 139   K 4.7 4.8  --  4.3 4.3    106  --  107 107   CO2 21* 26  --  23  22*   BUN 16 19  --  19 19   CREATININE 1.0 1.2  --  1.1 1.1    102  --  93 93   MG  --  1.9  --  2.1  --    PHOS  --   --  3.5  --   --    CALCIUM 9.3 9.4  --  9.5 9.4   ALBUMIN 3.6 3.2*  --   --  3.2*   PROT 7.3 7.1  --   --  7.2   ALKPHOS 132 115  --   --  114   ALT 8* 8*  --   --  9*   AST 12 18  --   --  13   BILITOT 0.5 0.6  --   --  0.6       INR:  Recent Labs   Lab 22  0614   INR 1.0         Diagnostic Studies:      EKD Echo:  Pre-op Assessment    I have reviewed the Patient Summary Reports.     I have reviewed the Nursing Notes. I have reviewed the NPO Status.   I have reviewed the Medications.     Review of Systems  Anesthesia Hx:  No problems with previous Anesthesia    Social:  Non-Smoker, No Alcohol Use    Hematology/Oncology:        Current/Recent Cancer. Other (see Oncology comments) chemotherapy   Pulmonary:  Pulmonary Normal        Physical Exam  General: Cooperative, Alert, Oriented and Well nourished    Airway:  Mallampati: III   Mouth Opening: Normal  TM Distance: Normal  Tongue: Large  Neck ROM: Normal ROM    Dental:  Periodontal disease        Anesthesia Plan  Type of Anesthesia, risks & benefits discussed:    Anesthesia Type: Gen ETT, Gen Natural Airway  Intra-op Monitoring Plan: Standard ASA Monitors  Post Op Pain Control Plan: multimodal analgesia  Induction:  IV  Informed Consent: Informed consent signed with the Patient and all parties understand the risks and agree with anesthesia plan.  All questions answered.   ASA Score: 3  Day of Surgery Review of History & Physical: H&P Update referred to the surgeon/provider.    Ready For Surgery From Anesthesia Perspective.     .

## 2022-03-04 NOTE — TRANSFER OF CARE
"Anesthesia Transfer of Care Note    Patient: Patito Domingo    Procedure(s) Performed: Procedure(s) (LRB):  ERCP (ENDOSCOPIC RETROGRADE CHOLANGIOPANCREATOGRAPHY) (N/A)    Patient location: PACU    Anesthesia Type: general    Transport from OR: Transported from OR on 2-3 L/min O2 by NC with adequate spontaneous ventilation    Post pain: adequate analgesia    Post assessment: no apparent anesthetic complications    Post vital signs: stable    Level of consciousness: awake and alert    Nausea/Vomiting: no nausea/vomiting    Complications: none    Transfer of care protocol was followed      Last vitals:   Visit Vitals  /80 (BP Location: Right arm, Patient Position: Lying)   Pulse 68   Temp 36.6 °C (97.9 °F) (Temporal)   Resp 18   Ht 5' 6" (1.676 m)   Wt 83 kg (183 lb)   LMP  (LMP Unknown)   SpO2 100%   Breastfeeding No   BMI 29.54 kg/m²     "

## 2022-03-04 NOTE — ASSESSMENT & PLAN NOTE
Hypertensive in ED, improved with hydralazine 10mg IV     Chronic, controlled.  Latest blood pressure and vitals reviewed-   Temp:  [97.3 °F (36.3 °C)-98.6 °F (37 °C)]   Pulse:  [56-80]   Resp:  [16-20]   BP: (119-160)/(58-74)   SpO2:  [96 %-98 %] .   Home meds for hypertension were reviewed and noted below.   Hypertension Medications             losartan (COZAAR) 100 MG tablet Take 1 tablet (100 mg total) by mouth once daily.    metoprolol tartrate (LOPRESSOR) 25 MG tablet TAKE 1 TABLET TWICE DAILY        While in the hospital, will manage blood pressure as follows; Continue home antihypertensive regimen

## 2022-03-04 NOTE — PROGRESS NOTES
[facility] Progress Note    Assessment/Plan:     UTI  History of recurrent UTI, previously admitted on 22 with klebsiella UTI and completed 14 day course of meropenem  - UA with pos nitrites, 3+ leukocyte esterase, moderate bacteria, and 73 WBC  - urine culture with >100,000 CFU of Hafnei Alvei that is pansensitive  - recommend to transition to cefepime and flagyl which would cover for anaerobic infection in setting of choledocholithiasis and instrumentation from ERCP      Haydee Montalvo MD  [facility]/Pediatrics PGY-2  [facility] Consult Service  Cell: [phone]    Thank you for allowing us to participate in the care of this patient. We will continue to follow along. Case has been discussed with consult staff, who is in agreement with assessment and plan. ID will continue to follow.     Subjective:      No acute events overnight. Is being transferred to [facility] [place] for ERCP and then retransferred [place] [facility] [place]. Unable to see patient today due to transfer for procedure.     Objective:   Last 24 Hour Vital Signs:  BP  Min: 119/69  Max: 160/74  Temp  Av.1 °F (36.7 °C)  Min: 97.3 °F (36.3 °C)  Max: 98.6 °F (37 °C)  Pulse  Av.9  Min: 56  Max: 80  Resp  Av.3  Min: 16  Max: 20  SpO2  Av.5 %  Min: 96 %  Max: 98 %  Weight  Av.3 kg (181 lb 8 oz)  Min: 82.1 kg (181 lb)  Max: 82.6 kg (182 lb)  I/O last 3 completed shifts:  In: 240 [P.O.:240]  Out: 1700 [Urine:1700]    Exam deferred as patient not in room.     Laboratory:  Laboratory Data Reviewed: yes  Pertinent Findings:  Recent Labs   Lab 22  1147 22  0614 22  0517 22  0518   WBC 5.87 4.78  --  4.60   HGB 10.6* 9.7*  --  10.7*   HCT 34.3* 31.9*  --  34.7*    253  --  241   MCV 88 88  --  88   RDW 13.5 13.7  --  13.5    139 139 139   K 4.7 4.8 4.3 4.3    106 107 107   CO2 21* 26 23 22*   BUN 16 19 19 19   CREATININE 1.0 1.2 1.1 1.1    102 93 93   PROT  7.3 7.1  --  7.2   ALBUMIN 3.6 3.2*  --  3.2*   BILITOT 0.5 0.6  --  0.6   AST 12 18  --  13   ALKPHOS 132 115  --  114   ALT 8* 8*  --  9*         Microbiology Data:  Microbiology Results (last 7 days)     Procedure Component Value Units Date/Time    Urine culture [[id number]]  (Abnormal)  (Susceptibility) Collected: 03/01/22 1116    Order Status: Completed Specimen: Urine Updated: 03/03/22 2341     Urine Culture, Routine HAFNIA ALVEI  >100,000 cfu/ml      Narrative:      Specimen Source->Urine          Antimicrobials:  Antibiotics (From admission, onward)            Start     Stop Route Frequency Ordered    03/02/22 2138  meropenem 1 g in sodium chloride 0.9 % 100 mL IVPB (ready to mix system)         -- IV Every 12 hours (non-standard times) 03/02/22 1037          Other Results:  Radiology Results:  X-Ray Chest 1 View    Result Date: 2/2/2022  EXAMINATION: XR CHEST 1 VIEW COMPARISON: Comparison is made to 11/20/2021, 11/09/2021, and 10/05/2021. FINDINGS: Even allowing for magnification of the cardiomediastinal silhouette related to projection, the heart appears minimally enlarged, though the cardiomediastinal silhouette demonstrates no detrimental change since the examinations referenced above.  Pulmonary vascularity is normal, and there are no findings indicating current cardiac decompensation.  Lung zones appear essentially clear, and are free of significant airspace consolidation or volume loss.  No pleural fluid.  No pneumothorax.     Mild cardiomegaly.  No significant detrimental interval change in the appearance of the chest since 11/20/2021. Electronically signed by: Roby rBice MD Date:    02/02/2022 Time:    06:25    US Retroperitoneal Complete    Result Date: 2/4/2022  EXAMINATION: US RETROPERITONEAL COMPLETE CLINICAL HISTORY: f/u hydronephrosis; TECHNIQUE: Ultrasound of the kidneys and urinary bladder was performed including color flow and Doppler evaluation of the kidneys. COMPARISON: CT abdomen pelvis  dated 02/02/2022 FINDINGS: Right kidney measures 9.7 cm.  Resistive index of 0.74.  Renal calculi, largest at the lower pole measuring 1.7 cm.  No hydronephrosis. Left kidney: Measures 11.2 cm.  Resistive index of 0.76.  Calculus at the lower pole measuring 1.1 cm.  No hydronephrosis. Urinary bladder is partially fluid-filled at the time of scanning and otherwise unremarkable.     No hydronephrosis. Nonobstructing renal calculi. Electronically signed by: Rodolfo Jones Date:    02/04/2022 Time:    13:51    CT Abdomen Pelvis With Contrast    Result Date: 2/2/2022  EXAMINATION: CT ABDOMEN PELVIS WITH CONTRAST CLINICAL HISTORY: Abdominal pain, acute, nonlocalized;hx of carcinoid tumor; TECHNIQUE: Low dose axial images, sagittal and coronal reformations were obtained from the lung bases to the pubic symphysis following the IV administration of 75 mL of Omnipaque 350 COMPARISON: November 20, 2021 FINDINGS: The inferior aspect of the lung parenchyma shows mild interstitial scarring of the lung tissue.  The inferior aspect of the heart is unremarkable. Imaging below the diaphragm reveals that there is dilatation of the biliary channels within the liver.  The patient is status post cholecystectomy.  The common bile duct is dilated to approximately 11 mm.  There is a small area of calcification involving the inferior aspect of the left lobe of the liver.  The pancreas appears to be within normal limits.  No indication of dilatation of the pancreatic duct. The adrenal glands and spleen are within normal limits. Both kidneys feature hydronephrosis.  The right kidney features a large staghorn like calcification occupying the inferior collecting system.  Image number 81 of series 601 shows a calcification of 2.4 x 1.8 cm.  Image number 75 of series 601 and image number 91 of series 2 shows a calcification in the inferior collecting system on the left of 9.2 x 6.3 cm.  Evaluation of the more distal aspects of the ureters does  not reveal evidence of a stone within the lumen of either and does not appear to be evidence of a stone within the lumen of the bladder. No indication of free air free fluid within the peritoneal cavity.  No evidence of mesenteric or retroperitoneal lymphadenopathy. The stomach, small bowel and large bowel all appear to be within normal limits.  There is stool appreciated the large bowel.  The appendix is not readily identifiable in today's examination.  The patient is status post hysterectomy.  The rectum is unremarkable.     Mild emphysematous changes of the lung parenchyma. Dilatation of the biliary channels throughout the liver parenchyma which was seen previously on the examination obtained in November 2021. Bilateral hydronephrosis and hydroureter however there is no obvious obstructing kidney stone seen within the ureter on either side.  The level of hydronephrosis and hydroureter has increased as compared to the previous examination. Large calcifications involving the inferior collecting system of each kidney. This report was flagged in Epic as abnormal. Electronically signed by: Alfa Grant Date:    02/02/2022 Time:    09:17    CT Abdomen Pelvis  Without Contrast    Result Date: 3/1/2022  EXAMINATION: CT ABDOMEN PELVIS WITHOUT CONTRAST CLINICAL HISTORY: Abdominal pain, acute, nonlocalized; TECHNIQUE: 5 mm axial images were obtained through the abdomen and pelvis without IV contrast.  Coronal and sagittal reformats were performed. COMPARISON: CT 02/02/2022. FINDINGS: Visualized heart is normal.  No pericardial effusion. There is stable patchy ground-glass attenuation and scattered subsegmental opacities within the visualized right lung and lingula.  No pleural effusion. The liver is stable in size.  Low-attenuation lesions within the left lobe/segment 4 appears similar when compared to the recent CT.  Scattered high attenuation foci throughout the left lobe likely related to post treatment change.  There  is persistent severe intrahepatic bile duct dilatation, similar to slightly progressed when compared to the previous exam. Gallbladder is surgically absent.  There is persistent dilatation of the extrahepatic bile duct with a 0.8 cm high attenuation intraluminal focus concerning for a stone. There is a small sliding-type hiatal hernia.  The stomach is otherwise decompressed and not well evaluated. Duodenum, spleen and adrenal glands are normal. There are low-attenuation lesions at the level of the pancreatic head and tail, incompletely evaluated on this exam but not significantly changed when compared to the previous CT.  No pancreatic ductal dilatation.  No peripancreatic inflammatory changes or drainable collections. Kidneys are normal in size and location.  Right kidney demonstrates multifocal areas of cortical thinning likely related to parenchymal scarring.  Large staghorn calculus noted within the inferior pole of the right renal collecting system.  Additional bilateral nonobstructing renal stones with stone burden unchanged when compared to the previous CT.  No hydronephrosis or hydroureter.  Bladder is fluid filled and unremarkable. Uterus is surgically absent.  Right ovary demonstrates no significant abnormalities.  The left ovary is not definitely seen.  No pelvic free fluid. Postoperative change of right hemicolectomy.  Residual colon demonstrates multiple scattered diverticuli.  Small bowel is normal in caliber.  No obstruction. The abdominal aorta tapers normally without atherosclerotic calcification. No ascites or intra-abdominal free air. There is a mesenteric partially calcified soft tissue mass at the level of the right hemiabdomen corresponding with the patient's known carcinoid tumor.  Surrounding nodular soft tissue attenuation lesions likely represent enlarged, metastatic lymph nodes. There is an increased number of slightly prominent retroperitoneal lymph nodes, not significantly changed when  compared to the previous CT.  Nodular area of soft tissue attenuation within the right mesorectal fascia (series 2, image 138), similar when compared to the previous exam. Surgical scar noted within the anterior midline abdominal wall with several small fat containing hernias nodular soft tissue attenuation and partially calcified foci noted within the subcutaneous soft tissues of the right anterior lower quadrant and left gluteus, likely related to previous injections. There are degenerative changes of the spine.  No suspicious lytic or blastic lesions.     1. CT findings concerning for choledocholithiasis with similar to slightly increased intra and extrahepatic bile duct dilatation.  Consider further characterization with MRCP. 2. Constellation of findings consistent with the patient's known metastatic carcinoid tumor including: *Stable partially calcified mesenteric mass with surrounding lymphadenopathy. *Multiple treated hepatic lesions. 3.  Bilateral nonobstructing renal stones with stone burden similar when compared to the previous CT. 4.  Multifocal areas of right renal cortical thinning likely representing scars. 5.  Postoperative changes of right hemicolectomy for presumed distal small bowel tumor resection. 6.  Low-attenuation pancreatic parenchymal lesions, possibly small cysts or side branch IPMNs but incompletely characterized on this exam.  Attention on follow-up MRCP recommended. 7.  Additional findings as detailed in the body of the report. Electronically signed by: Ayden Cates MD Date:    03/01/2022 Time:    13:13      Current Medications:     Infusions:   octreotide infusion (50 mcg/mL) 250 mcg/hr (03/04/22 0545)        Scheduled:   atorvastatin  40 mg Oral QHS    cyanocobalamin  1,000 mcg Oral Daily    enoxaparin  40 mg Subcutaneous Daily    losartan  100 mg Oral Daily    meropenem (MERREM) IVPB  1 g Intravenous Q12H    metoprolol tartrate  25 mg Oral BID        PRN:  acetaminophen,  dextrose 10%, dextrose 10%, glucagon (human recombinant), glucose, glucose, melatonin, naloxone, ondansetron, oxyCODONE, sodium chloride 0.9%

## 2022-03-04 NOTE — PLAN OF CARE
Adult Patient Discharge. JAMMIE/PADSS Scoring met. PO Liquids tolerated prior to discharge. Education provided. Sandra called for patient transport. Family notified of patient in recovery via phone.

## 2022-03-05 PROCEDURE — 36415 COLL VENOUS BLD VENIPUNCTURE: CPT | Performed by: HOSPITALIST

## 2022-03-05 PROCEDURE — 94761 N-INVAS EAR/PLS OXIMETRY MLT: CPT

## 2022-03-05 PROCEDURE — 99232 PR SUBSEQUENT HOSPITAL CARE,LEVL II: ICD-10-PCS | Mod: GC,,, | Performed by: INTERNAL MEDICINE

## 2022-03-05 PROCEDURE — 63600175 PHARM REV CODE 636 W HCPCS: Performed by: HOSPITALIST

## 2022-03-05 PROCEDURE — 99232 SBSQ HOSP IP/OBS MODERATE 35: CPT | Mod: GC,,, | Performed by: INTERNAL MEDICINE

## 2022-03-05 PROCEDURE — 11000001 HC ACUTE MED/SURG PRIVATE ROOM

## 2022-03-05 PROCEDURE — 87040 BLOOD CULTURE FOR BACTERIA: CPT | Performed by: HOSPITALIST

## 2022-03-05 PROCEDURE — 25000003 PHARM REV CODE 250: Performed by: HOSPITALIST

## 2022-03-05 PROCEDURE — 27000207 HC ISOLATION

## 2022-03-05 PROCEDURE — 25000003 PHARM REV CODE 250: Performed by: NURSE PRACTITIONER

## 2022-03-05 RX ORDER — AMLODIPINE BESYLATE 2.5 MG/1
2.5 TABLET ORAL DAILY
Status: DISCONTINUED | OUTPATIENT
Start: 2022-03-05 | End: 2022-03-07 | Stop reason: HOSPADM

## 2022-03-05 RX ORDER — CEFEPIME HYDROCHLORIDE 1 G/50ML
1 INJECTION, SOLUTION INTRAVENOUS
Status: DISCONTINUED | OUTPATIENT
Start: 2022-03-05 | End: 2022-03-07 | Stop reason: HOSPADM

## 2022-03-05 RX ORDER — METRONIDAZOLE 500 MG/1
500 TABLET ORAL EVERY 8 HOURS
Status: DISCONTINUED | OUTPATIENT
Start: 2022-03-05 | End: 2022-03-07 | Stop reason: HOSPADM

## 2022-03-05 RX ADMIN — MEROPENEM 1 G: 1 INJECTION, POWDER, FOR SOLUTION INTRAVENOUS at 01:03

## 2022-03-05 RX ADMIN — ENOXAPARIN SODIUM 40 MG: 100 INJECTION SUBCUTANEOUS at 05:03

## 2022-03-05 RX ADMIN — OXYCODONE 15 MG: 5 TABLET ORAL at 04:03

## 2022-03-05 RX ADMIN — ATORVASTATIN CALCIUM 40 MG: 40 TABLET, FILM COATED ORAL at 09:03

## 2022-03-05 RX ADMIN — OXYCODONE 15 MG: 5 TABLET ORAL at 08:03

## 2022-03-05 RX ADMIN — CYANOCOBALAMIN TAB 1000 MCG 1000 MCG: 1000 TAB at 08:03

## 2022-03-05 RX ADMIN — CEFEPIME 1 G: 1 INJECTION, POWDER, FOR SOLUTION INTRAMUSCULAR; INTRAVENOUS at 09:03

## 2022-03-05 RX ADMIN — OXYCODONE 15 MG: 5 TABLET ORAL at 11:03

## 2022-03-05 RX ADMIN — METOPROLOL TARTRATE 25 MG: 25 TABLET, FILM COATED ORAL at 08:03

## 2022-03-05 RX ADMIN — ACETAMINOPHEN 650 MG: 325 TABLET ORAL at 04:03

## 2022-03-05 RX ADMIN — LOSARTAN POTASSIUM 100 MG: 50 TABLET, FILM COATED ORAL at 08:03

## 2022-03-05 RX ADMIN — CEFEPIME 1 G: 1 INJECTION, POWDER, FOR SOLUTION INTRAMUSCULAR; INTRAVENOUS at 08:03

## 2022-03-05 RX ADMIN — METRONIDAZOLE 500 MG: 500 TABLET ORAL at 02:03

## 2022-03-05 RX ADMIN — AMLODIPINE BESYLATE 2.5 MG: 2.5 TABLET ORAL at 05:03

## 2022-03-05 RX ADMIN — METRONIDAZOLE 500 MG: 500 TABLET ORAL at 09:03

## 2022-03-05 RX ADMIN — ACETAMINOPHEN 650 MG: 325 TABLET ORAL at 08:03

## 2022-03-05 RX ADMIN — ACETAMINOPHEN 650 MG: 325 TABLET ORAL at 05:03

## 2022-03-05 RX ADMIN — METOPROLOL TARTRATE 25 MG: 25 TABLET, FILM COATED ORAL at 09:03

## 2022-03-05 NOTE — SUBJECTIVE & OBJECTIVE
Interval History:  Reported abdominal pain last night following ERCP, although at his improved significantly by this morning.  Febrile overnight, but improved today.  Discussed with Infectious Disease and will need 14 day course of antibiotics.    Review of Systems   Constitutional:  Negative for chills and fever.   Respiratory:  Negative for shortness of breath.    Gastrointestinal:  Positive for abdominal pain.   Genitourinary:  Positive for dysuria.   Neurological:  Positive for weakness.   Objective:     Vital Signs (Most Recent):  Temp: 98 °F (36.7 °C) (03/05/22 1051)  Pulse: 68 (03/05/22 1051)  Resp: 18 (03/05/22 1051)  BP: (!) 147/67 (03/05/22 1051)  SpO2: 96 % (03/05/22 0822)   Vital Signs (24h Range):  Temp:  [97.2 °F (36.2 °C)-102.7 °F (39.3 °C)] 98 °F (36.7 °C)  Pulse:  [61-90] 68  Resp:  [16-18] 18  SpO2:  [94 %-100 %] 96 %  BP: (123-189)/(56-89) 147/67     Weight: 82.1 kg (181 lb)  Body mass index is 29.21 kg/m².    Intake/Output Summary (Last 24 hours) at 3/5/2022 1507  Last data filed at 3/4/2022 2107  Gross per 24 hour   Intake 300 ml   Output 400 ml   Net -100 ml        Physical Exam  Constitutional:       General: She is not in acute distress.     Appearance: Normal appearance. She is normal weight. She is not ill-appearing or toxic-appearing.   HENT:      Head: Normocephalic and atraumatic.      Mouth/Throat:      Mouth: Mucous membranes are moist.      Pharynx: Oropharynx is clear.   Eyes:      Extraocular Movements: Extraocular movements intact.      Pupils: Pupils are equal, round, and reactive to light.   Cardiovascular:      Rate and Rhythm: Normal rate and regular rhythm.      Pulses: Normal pulses.      Heart sounds: Normal heart sounds. No murmur heard.  Pulmonary:      Effort: Pulmonary effort is normal. No respiratory distress.      Breath sounds: Normal breath sounds.   Abdominal:      General: Abdomen is flat. Bowel sounds are normal.      Palpations: Abdomen is soft.      Tenderness:  There is abdominal tenderness in the right upper quadrant and suprapubic area. There is no rebound.   Musculoskeletal:      Right lower leg: No edema.      Left lower leg: No edema.   Skin:     General: Skin is warm and dry.   Neurological:      Mental Status: She is alert and oriented to person, place, and time. Mental status is at baseline.   Psychiatric:         Mood and Affect: Mood normal.         Behavior: Behavior normal.         Thought Content: Thought content normal.         Judgment: Judgment normal.       Significant Labs: All pertinent labs within the past 24 hours have been reviewed.  BMP:   Recent Labs   Lab 03/04/22 2149   *      K 4.3      CO2 24   BUN 19   CREATININE 1.1   CALCIUM 8.9   MG 2.0       CBC:   Recent Labs   Lab 03/04/22 2149   WBC 4.53   HGB 10.4*   HCT 33.4*          CMP:   Recent Labs   Lab 03/04/22 2149      K 4.3      CO2 24   *   BUN 19   CREATININE 1.1   CALCIUM 8.9   PROT 6.8   ALBUMIN 3.3*   BILITOT 0.8   ALKPHOS 382*   *   ALT 62*   ANIONGAP 9   EGFRNONAA 51*         Significant Imaging: I have reviewed all pertinent imaging results/findings within the past 24 hours.

## 2022-03-05 NOTE — CARE UPDATE
"Patient called around 2355 and stated she was "experiencing chills" I checked her vitals with a tempeture resulting in 101.4, I administered Tylenol and reched 45 minutes later with an increased tempeture of 102.7. I notified team with additional orders to remove blankets and apply ice packs. The patient was very disturbed due to increasing chills and tremors however I still applied ice packs and and removed all blankets except one. Will re-assess in one hour  "

## 2022-03-05 NOTE — PROGRESS NOTES
"[facility] Infectious Diseases Progress Note    Assessment/Plan:     UTI  History of recurrent UTI, previously admitted on 22 with klebsiella UTI and completed 14 day course of meropenem  - UA with pos nitrites, 3+ leukocyte esterase, moderate bacteria, and 73 WBC  - urine culture with >100,000 CFU of Hafnei Alvei that is pansensitive  - recommend to transition to cefepime and flagyl which would cover for anaerobic infection in setting of choledocholithiasis and instrumentation from ERCP  -plan for 14 days of cefepime and flagyl from the ERCP  -ID will sign off, call with questions        Thank you for allowing us to participate in the care of this patient. ID will sign off    Subjective:      No acute events overnight. ERCP was performed     Objective:   Last 24 Hour Vital Signs:  BP  Min: 123/56  Max: 197/77  Temp  Av.9 °F (37.2 °C)  Min: 97.2 °F (36.2 °C)  Max: 102.7 °F (39.3 °C)  Pulse  Av.4  Min: 61  Max: 90  Resp  Av.8  Min: 16  Max: 18  SpO2  Av.7 %  Min: 94 %  Max: 100 %  Height  Av' 6" (167.6 cm)  Min: 5' 6" (167.6 cm)  Max: 5' 6" (167.6 cm)  Weight  Av kg (183 lb)  Min: 83 kg (183 lb)  Max: 83 kg (183 lb)  I/O last 3 completed shifts:  In: 300 [IV Piggyback:300]  Out: 700 [Urine:700]    Exam deferred as patient not in room.     Laboratory:  Laboratory Data Reviewed: yes  Pertinent Findings:  Recent Labs   Lab 22  0614 22  0517 22  0518 22  2149   WBC 4.78  --  4.60 4.53   HGB 9.7*  --  10.7* 10.4*   HCT 31.9*  --  34.7* 33.4*     --  241 204   MCV 88  --  88 89   RDW 13.7  --  13.5 13.3    139 139 142   K 4.8 4.3 4.3 4.3    107 107 109   CO2 26 23 22* 24   BUN 19 19 19 19   CREATININE 1.2 1.1 1.1 1.1    93 93 185*   PROT 7.1  --  7.2 6.8   ALBUMIN 3.2*  --  3.2* 3.3*   BILITOT 0.6  --  0.6 0.8   AST 18  --  13 275*   ALKPHOS 115  --  114 382*   ALT 8*  --  9* 62*         Microbiology Data:  Microbiology Results (last 7 days)     " Procedure Component Value Units Date/Time    Urine culture [[id number]]  (Abnormal)  (Susceptibility) Collected: 03/01/22 1116    Order Status: Completed Specimen: Urine Updated: 03/03/22 2341     Urine Culture, Routine HAFNIA ALVEI  >100,000 cfu/ml      Narrative:      Specimen Source->Urine          Antimicrobials:  Antibiotics (From admission, onward)            Start     Stop Route Frequency Ordered    03/05/22 1400  metroNIDAZOLE tablet 500 mg         -- Oral Every 8 hours 03/05/22 0719    03/05/22 0900  cefepime in dextrose 5 % 1 gram/50 mL IVPB 1 g         -- IV Every 12 hours (non-standard times) 03/05/22 0719          Other Results:  Radiology Results:  X-Ray Chest 1 View    Result Date: 2/2/2022  EXAMINATION: XR CHEST 1 VIEW COMPARISON: Comparison is made to 11/20/2021, 11/09/2021, and 10/05/2021. FINDINGS: Even allowing for magnification of the cardiomediastinal silhouette related to projection, the heart appears minimally enlarged, though the cardiomediastinal silhouette demonstrates no detrimental change since the examinations referenced above.  Pulmonary vascularity is normal, and there are no findings indicating current cardiac decompensation.  Lung zones appear essentially clear, and are free of significant airspace consolidation or volume loss.  No pleural fluid.  No pneumothorax.     Mild cardiomegaly.  No significant detrimental interval change in the appearance of the chest since 11/20/2021. Electronically signed by: Roby Brice MD Date:    02/02/2022 Time:    06:25    US Retroperitoneal Complete    Result Date: 2/4/2022  EXAMINATION: US RETROPERITONEAL COMPLETE CLINICAL HISTORY: f/u hydronephrosis; TECHNIQUE: Ultrasound of the kidneys and urinary bladder was performed including color flow and Doppler evaluation of the kidneys. COMPARISON: CT abdomen pelvis dated 02/02/2022 FINDINGS: Right kidney measures 9.7 cm.  Resistive index of 0.74.  Renal calculi, largest at the lower pole measuring 1.7 cm.   No hydronephrosis. Left kidney: Measures 11.2 cm.  Resistive index of 0.76.  Calculus at the lower pole measuring 1.1 cm.  No hydronephrosis. Urinary bladder is partially fluid-filled at the time of scanning and otherwise unremarkable.     No hydronephrosis. Nonobstructing renal calculi. Electronically signed by: Rodolfo Jones Date:    02/04/2022 Time:    13:51    CT Abdomen Pelvis With Contrast    Result Date: 2/2/2022  EXAMINATION: CT ABDOMEN PELVIS WITH CONTRAST CLINICAL HISTORY: Abdominal pain, acute, nonlocalized;hx of carcinoid tumor; TECHNIQUE: Low dose axial images, sagittal and coronal reformations were obtained from the lung bases to the pubic symphysis following the IV administration of 75 mL of Omnipaque 350 COMPARISON: November 20, 2021 FINDINGS: The inferior aspect of the lung parenchyma shows mild interstitial scarring of the lung tissue.  The inferior aspect of the heart is unremarkable. Imaging below the diaphragm reveals that there is dilatation of the biliary channels within the liver.  The patient is status post cholecystectomy.  The common bile duct is dilated to approximately 11 mm.  There is a small area of calcification involving the inferior aspect of the left lobe of the liver.  The pancreas appears to be within normal limits.  No indication of dilatation of the pancreatic duct. The adrenal glands and spleen are within normal limits. Both kidneys feature hydronephrosis.  The right kidney features a large staghorn like calcification occupying the inferior collecting system.  Image number 81 of series 601 shows a calcification of 2.4 x 1.8 cm.  Image number 75 of series 601 and image number 91 of series 2 shows a calcification in the inferior collecting system on the left of 9.2 x 6.3 cm.  Evaluation of the more distal aspects of the ureters does not reveal evidence of a stone within the lumen of either and does not appear to be evidence of a stone within the lumen of the bladder. No  indication of free air free fluid within the peritoneal cavity.  No evidence of mesenteric or retroperitoneal lymphadenopathy. The stomach, small bowel and large bowel all appear to be within normal limits.  There is stool appreciated the large bowel.  The appendix is not readily identifiable in today's examination.  The patient is status post hysterectomy.  The rectum is unremarkable.     Mild emphysematous changes of the lung parenchyma. Dilatation of the biliary channels throughout the liver parenchyma which was seen previously on the examination obtained in November 2021. Bilateral hydronephrosis and hydroureter however there is no obvious obstructing kidney stone seen within the ureter on either side.  The level of hydronephrosis and hydroureter has increased as compared to the previous examination. Large calcifications involving the inferior collecting system of each kidney. This report was flagged in Epic as abnormal. Electronically signed by: Alfa Grant Date:    02/02/2022 Time:    09:17    CT Abdomen Pelvis  Without Contrast    Result Date: 3/1/2022  EXAMINATION: CT ABDOMEN PELVIS WITHOUT CONTRAST CLINICAL HISTORY: Abdominal pain, acute, nonlocalized; TECHNIQUE: 5 mm axial images were obtained through the abdomen and pelvis without IV contrast.  Coronal and sagittal reformats were performed. COMPARISON: CT 02/02/2022. FINDINGS: Visualized heart is normal.  No pericardial effusion. There is stable patchy ground-glass attenuation and scattered subsegmental opacities within the visualized right lung and lingula.  No pleural effusion. The liver is stable in size.  Low-attenuation lesions within the left lobe/segment 4 appears similar when compared to the recent CT.  Scattered high attenuation foci throughout the left lobe likely related to post treatment change.  There is persistent severe intrahepatic bile duct dilatation, similar to slightly progressed when compared to the previous exam. Gallbladder is  surgically absent.  There is persistent dilatation of the extrahepatic bile duct with a 0.8 cm high attenuation intraluminal focus concerning for a stone. There is a small sliding-type hiatal hernia.  The stomach is otherwise decompressed and not well evaluated. Duodenum, spleen and adrenal glands are normal. There are low-attenuation lesions at the level of the pancreatic head and tail, incompletely evaluated on this exam but not significantly changed when compared to the previous CT.  No pancreatic ductal dilatation.  No peripancreatic inflammatory changes or drainable collections. Kidneys are normal in size and location.  Right kidney demonstrates multifocal areas of cortical thinning likely related to parenchymal scarring.  Large staghorn calculus noted within the inferior pole of the right renal collecting system.  Additional bilateral nonobstructing renal stones with stone burden unchanged when compared to the previous CT.  No hydronephrosis or hydroureter.  Bladder is fluid filled and unremarkable. Uterus is surgically absent.  Right ovary demonstrates no significant abnormalities.  The left ovary is not definitely seen.  No pelvic free fluid. Postoperative change of right hemicolectomy.  Residual colon demonstrates multiple scattered diverticuli.  Small bowel is normal in caliber.  No obstruction. The abdominal aorta tapers normally without atherosclerotic calcification. No ascites or intra-abdominal free air. There is a mesenteric partially calcified soft tissue mass at the level of the right hemiabdomen corresponding with the patient's known carcinoid tumor.  Surrounding nodular soft tissue attenuation lesions likely represent enlarged, metastatic lymph nodes. There is an increased number of slightly prominent retroperitoneal lymph nodes, not significantly changed when compared to the previous CT.  Nodular area of soft tissue attenuation within the right mesorectal fascia (series 2, image 138), similar  when compared to the previous exam. Surgical scar noted within the anterior midline abdominal wall with several small fat containing hernias nodular soft tissue attenuation and partially calcified foci noted within the subcutaneous soft tissues of the right anterior lower quadrant and left gluteus, likely related to previous injections. There are degenerative changes of the spine.  No suspicious lytic or blastic lesions.     1. CT findings concerning for choledocholithiasis with similar to slightly increased intra and extrahepatic bile duct dilatation.  Consider further characterization with MRCP. 2. Constellation of findings consistent with the patient's known metastatic carcinoid tumor including: *Stable partially calcified mesenteric mass with surrounding lymphadenopathy. *Multiple treated hepatic lesions. 3.  Bilateral nonobstructing renal stones with stone burden similar when compared to the previous CT. 4.  Multifocal areas of right renal cortical thinning likely representing scars. 5.  Postoperative changes of right hemicolectomy for presumed distal small bowel tumor resection. 6.  Low-attenuation pancreatic parenchymal lesions, possibly small cysts or side branch IPMNs but incompletely characterized on this exam.  Attention on follow-up MRCP recommended. 7.  Additional findings as detailed in the body of the report. Electronically signed by: Ayden Cates MD Date:    03/01/2022 Time:    13:13      Current Medications:     Infusions:       Scheduled:   atorvastatin  40 mg Oral QHS    ceFEPime (MAXIPIME) IVPB  1 g Intravenous Q12H    cyanocobalamin  1,000 mcg Oral Daily    enoxaparin  40 mg Subcutaneous Daily    losartan  100 mg Oral Daily    metoprolol tartrate  25 mg Oral BID    metroNIDAZOLE  500 mg Oral Q8H        PRN:  acetaminophen, dextrose 10%, dextrose 10%, glucagon (human recombinant), glucose, glucose, melatonin, naloxone, ondansetron, oxyCODONE, sodium chloride 0.9%

## 2022-03-05 NOTE — ANESTHESIA POSTPROCEDURE EVALUATION
Anesthesia Post Evaluation    Patient: Patito Domingo    Procedure(s) Performed: Procedure(s) (LRB):  ERCP (ENDOSCOPIC RETROGRADE CHOLANGIOPANCREATOGRAPHY) (N/A)    Final Anesthesia Type: general      Patient location during evaluation: PACU  Patient participation: Yes- Able to Participate  Level of consciousness: awake and alert and oriented  Post-procedure vital signs: reviewed and stable  Pain management: adequate  Airway patency: patent    PONV status at discharge: No PONV  Anesthetic complications: no      Cardiovascular status: hemodynamically stable  Respiratory status: unassisted, spontaneous ventilation and room air  Hydration status: euvolemic  Follow-up not needed.          Vitals Value Taken Time   /57 03/04/22 1748   Temp 36.6 °C (97.9 °F) 03/04/22 1530   Pulse 69 03/04/22 1720   Resp 18 03/04/22 1720   SpO2 100 % 03/04/22 1720   Vitals shown include unvalidated device data.      No case tracking events are documented in the log.      Pain/Alina Score: Pain Rating Prior to Med Admin: 0 (3/4/2022  5:20 PM)  Pain Rating Post Med Admin: 0 (3/4/2022  5:20 PM)  Alina Score: 10 (3/4/2022  5:20 PM)

## 2022-03-05 NOTE — NURSING
Pt AAOx4. Head to toe assessment complete. No c/o pain or n/v/d at the moment. Vitals are stable, pt resting in bed watching television. Contact precautions still intact. Meds administered per mar. Encouraged to call for help for assistance. Call light in reach, bed in lowest position and locked. Will continue to monitor.

## 2022-03-05 NOTE — PROGRESS NOTES
[facility]/General Surgery  Progress Note    Admit Date: 3/1/2022  Hospital Day: 2  Procedure:   ERCP ytd    Subjective:  ERCP ytd  Fever overnight  Stated she had pain after procedure since improved    denies n/v  Passing flatus no BM      Physical Exam:  Gen: no acute distress. Alert and oriented x3.  HEENT: normocephalic and atraumatic. EOMI.   Resp: unlabored respirations  CV: regular rate, distal pulses intact  Abd: soft, non-tender, non-distended,   Ext: warm and well perfused  MSK: strength grossly intact  Neuro: no focal deficits, normal sensation    Labs reviewed below- pending  I/O (last 24 hours):  UOP: not recorded      Assessment/Plan: 71 y.o. female admitted 3/1/2022 with choledocolithiasis s/p ERCP    Stone removed ytd  Continue diet from surgical standpoint  Rest of care per marquita Lugo MD   [facility], PGY4  03/05/2022       Inpatient Data     Vitals:   Temp:  [97.2 °F (36.2 °C)-102.7 °F (39.3 °C)] 97.2 °F (36.2 °C)  Pulse:  [61-90] 74  Resp:  [16-18] 18  SpO2:  [94 %-100 %] 96 %  BP: (123-197)/(56-89) 126/60 Intake/Output:  03/04 0701 - 03/05 0700  In: 300   Out: 400 [Urine:400]       Recent Labs     03/03/22  0102 03/03/22  0517 03/03/22  0518 03/04/22  2149   WBC  --   --  4.60 4.53   HGB  --   --  10.7* 10.4*   HCT  --   --  34.7* 33.4*   PLT  --   --  241 204   NA  --  139 139 142   K  --  4.3 4.3 4.3   CL  --  107 107 109   CO2  --  23 22* 24   BUN  --  19 19 19   CREATININE  --  1.1 1.1 1.1   BILITOT  --   --  0.6 0.8   AST  --   --  13 275*   ALT  --   --  9* 62*   ALKPHOS  --   --  114 382*   CALCIUM  --  9.5 9.4 8.9   ALBUMIN  --   --  3.2* 3.3*   PROT  --   --  7.2 6.8   MG  --  2.1  --  2.0   PHOS 3.5  --   --  3.3   PREALBUMIN 15*  --   --   --         Scheduled Meds:   atorvastatin  40 mg Oral QHS    ceFEPime (MAXIPIME) IVPB  1 g Intravenous Q12H    cyanocobalamin  1,000 mcg Oral Daily    enoxaparin  40 mg Subcutaneous Daily    losartan  100  mg Oral Daily    metoprolol tartrate  25 mg Oral BID    metroNIDAZOLE  500 mg Oral Q8H     Continuous Infusions:  PRN Meds:acetaminophen, dextrose 10%, dextrose 10%, glucagon (human recombinant), glucose, glucose, melatonin, naloxone, ondansetron, oxyCODONE, sodium chloride 0.9%

## 2022-03-05 NOTE — ASSESSMENT & PLAN NOTE
History of UTIs. Prior cultures positive for klebsiella as well as multidrug resistant bacteria.    - Urine culture pending; hx of ESBL  - deescalated to cefepime and metronidazole per Infectious Disease; will need 14 day course  - ID consulted, appreciate recs

## 2022-03-05 NOTE — ASSESSMENT & PLAN NOTE
Patient complaining of RUQ pain     CT noted worsening intra and extrahepatic bile duct dilatation in setting of normal LFTs with concern for choledocolithiais. Previous cholecystectomy, ERCP for choledocolithiasis 11/2021   Afebrile without leukocytosis and normal lipase    General surgery consulted, no surgical intervention. GI consulted, appreciate recommendations    - Per GI,  low threshold for AB coverage if febrile or clinical decompensation, clinically not consistent with cholangitis at this time  - MRCP c/w choledocholithiasis; ERCP with octreotide performed 3/4  - Surgery tentatively planning cytoreduction +/- choledochoduodenostomy, will discuss timing  - antibiotic recommendations as below per Infectious Disease

## 2022-03-05 NOTE — PROGRESS NOTES
"[facility]  Progress Note    Patient Name: Patito Domingo  MRN: [id number]  Patient Class: IP- Inpatient   Admission Date: 3/1/2022  Length of Stay: 2 days  Attending Physician: Eleno Osborne, *  Primary Care Provider: Yasir Myers Jr, MD        Subjective:     Principal Problem:Choledocholithiasis        HPI:  Patito Domingo is a 70 yo female with a pmh of cholecystectomy, colon cancer, liver tumor, CVA, HTN, frequent UTI. She presented with abdominal pain that started yesterday evening. She described right sided abdominal pain into the RUQ that is worse when she bends forward. She denies fevers but does report chills yesterday. She takes narcotics for chronic pain due to colon cancer but states this pain was different than normal. She reports having her gall bladder removed a few years ago. No change in appetite. She also reports vaginal irritation and dysuria which has been ongoing. She was admitted here earlier this month for a UTI, treated with merrem. She is followed by urology outpatient. She had a UA done on 2/23/22 due to urinary symptoms and found to be positive for UTI. She was prescribed macrobid but did not take it because 'it does not work". Today in the ED, she was found to have a UTI. CT abdomen with concern for choledocholithiasis with similar to slightly increased intra and extrahepatic bile duct dilatation. The patient was given a dose of macrobid in the ED and neuroendocrine was consulted per ED provider. She was also hypertensive on arrival and was given hydralazine IV. No leukocytosis or  elevated LFTs on arrival.       Overview/Hospital Course:  Patient is 71 year old female admitted for choledocholithiasis and UTI. Started on IV meropenem. ID consulted for recommendations on duration of antibiotic course given history of recurrent UTIs. GI consulted, plan for MRCP. General surgery and neuroendocrine following, no surgical intervention planned. "       Interval History:  Reported abdominal pain last night following ERCP, although at his improved significantly by this morning.  Febrile overnight, but improved today.  Discussed with Infectious Disease and will need 14 day course of antibiotics.    Review of Systems   Constitutional:  Negative for chills and fever.   Respiratory:  Negative for shortness of breath.    Gastrointestinal:  Positive for abdominal pain.   Genitourinary:  Positive for dysuria.   Neurological:  Positive for weakness.   Objective:     Vital Signs (Most Recent):  Temp: 98 °F (36.7 °C) (03/05/22 1051)  Pulse: 68 (03/05/22 1051)  Resp: 18 (03/05/22 1051)  BP: (!) 147/67 (03/05/22 1051)  SpO2: 96 % (03/05/22 0822)   Vital Signs (24h Range):  Temp:  [97.2 °F (36.2 °C)-102.7 °F (39.3 °C)] 98 °F (36.7 °C)  Pulse:  [61-90] 68  Resp:  [16-18] 18  SpO2:  [94 %-100 %] 96 %  BP: (123-189)/(56-89) 147/67     Weight: 82.1 kg (181 lb)  Body mass index is 29.21 kg/m².    Intake/Output Summary (Last 24 hours) at 3/5/2022 1507  Last data filed at 3/4/2022 2107  Gross per 24 hour   Intake 300 ml   Output 400 ml   Net -100 ml        Physical Exam  Constitutional:       General: She is not in acute distress.     Appearance: Normal appearance. She is normal weight. She is not ill-appearing or toxic-appearing.   HENT:      Head: Normocephalic and atraumatic.      Mouth/Throat:      Mouth: Mucous membranes are moist.      Pharynx: Oropharynx is clear.   Eyes:      Extraocular Movements: Extraocular movements intact.      Pupils: Pupils are equal, round, and reactive to light.   Cardiovascular:      Rate and Rhythm: Normal rate and regular rhythm.      Pulses: Normal pulses.      Heart sounds: Normal heart sounds. No murmur heard.  Pulmonary:      Effort: Pulmonary effort is normal. No respiratory distress.      Breath sounds: Normal breath sounds.   Abdominal:      General: Abdomen is flat. Bowel sounds are normal.      Palpations: Abdomen is soft.       Tenderness: There is abdominal tenderness in the right upper quadrant and suprapubic area. There is no rebound.   Musculoskeletal:      Right lower leg: No edema.      Left lower leg: No edema.   Skin:     General: Skin is warm and dry.   Neurological:      Mental Status: She is alert and oriented to person, place, and time. Mental status is at baseline.   Psychiatric:         Mood and Affect: Mood normal.         Behavior: Behavior normal.         Thought Content: Thought content normal.         Judgment: Judgment normal.       Significant Labs: All pertinent labs within the past 24 hours have been reviewed.  BMP:   Recent Labs   Lab 03/04/22  2149   *      K 4.3      CO2 24   BUN 19   CREATININE 1.1   CALCIUM 8.9   MG 2.0       CBC:   Recent Labs   Lab 03/04/22 2149   WBC 4.53   HGB 10.4*   HCT 33.4*          CMP:   Recent Labs   Lab 03/04/22 2149      K 4.3      CO2 24   *   BUN 19   CREATININE 1.1   CALCIUM 8.9   PROT 6.8   ALBUMIN 3.3*   BILITOT 0.8   ALKPHOS 382*   *   ALT 62*   ANIONGAP 9   EGFRNONAA 51*         Significant Imaging: I have reviewed all pertinent imaging results/findings within the past 24 hours.      Assessment/Plan:      * Choledocholithiasis  Patient complaining of RUQ pain     CT noted worsening intra and extrahepatic bile duct dilatation in setting of normal LFTs with concern for choledocolithiais. Previous cholecystectomy, ERCP for choledocolithiasis 11/2021   Afebrile without leukocytosis and normal lipase    General surgery consulted, no surgical intervention. GI consulted, appreciate recommendations    - Per GI,  low threshold for AB coverage if febrile or clinical decompensation, clinically not consistent with cholangitis at this time  - MRCP c/w choledocholithiasis; ERCP with octreotide performed 3/4  - Surgery tentatively planning cytoreduction +/- choledochoduodenostomy, will discuss timing  - antibiotic recommendations as  below per Infectious Disease    Dilation of biliary tract  See choledocholithiasis    Chronic diastolic heart failure with preserved ejection fraction  No acute symptoms indicative of HF exacerbation.  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  Latest blood pressure and vitals reviewed-   Temp:  [97.2 °F (36.2 °C)-102.7 °F (39.3 °C)]   Pulse:  [61-90]   Resp:  [16-18]   BP: (123-189)/(56-89)   SpO2:  [94 %-100 %] .   Home meds for hypertension were reviewed and noted below.   Hypertension Medications             losartan (COZAAR) 100 MG tablet Take 1 tablet (100 mg total) by mouth once daily.    metoprolol tartrate (LOPRESSOR) 25 MG tablet TAKE 1 TABLET TWICE DAILY        While in the hospital, will manage blood pressure as follows; Continue home antihypertensive regimen    Chronic pain syndrome  History of colon cancer, followed by oncologist. Reports talking oxycodone at home    - Continue home dose oxycodone 15 mg q4 hrs PRN    Urinary tract infection without hematuria  History of UTIs. Prior cultures positive for klebsiella as well as multidrug resistant bacteria.    - Urine culture pending; hx of ESBL  - deescalated to cefepime and metronidazole per Infectious Disease; will need 14 day  course  - ID consulted, appreciate recs    Nephrolithiasis  CT abdomen/pelvis showed right kidney with multifocal areas of cortical thinning likely related to parenchymal scarring, karge staghorn calculus noted within the inferior pole of the right renal collecting system, and additional bilateral nonobstructing renal stones with stone burden unchanged when compared to the previous CT without hydronephrosis or hydroureter    Patient reports regularly seeing her urologist with next appointment 3/15/22    - Will follow up with urologist as an outpatient    Neuroendocrine carcinoma, unknown primary site          VTE Risk Mitigation (From admission, onward)         Ordered     enoxaparin injection 40 mg  Daily         03/01/22 2321     IP VTE HIGH RISK PATIENT  Once         03/01/22 1635     Place sequential compression device  Until discontinued         03/01/22 1635                Discharge Planning   JULIO C: 3/7/2022     Code Status: Prior   Is the patient medically ready for discharge?:     Reason for patient still in hospital (select all that apply): Patient trending condition and Pending disposition                     Eleno Osborne MD  Department of Hospital Medicine   [facility]

## 2022-03-05 NOTE — ASSESSMENT & PLAN NOTE
Hypertensive in ED, improved with hydralazine 10mg IV     Chronic, controlled.  Latest blood pressure and vitals reviewed-   Temp:  [97.2 °F (36.2 °C)-102.7 °F (39.3 °C)]   Pulse:  [61-90]   Resp:  [16-18]   BP: (123-189)/(56-89)   SpO2:  [94 %-100 %] .   Home meds for hypertension were reviewed and noted below.   Hypertension Medications             losartan (COZAAR) 100 MG tablet Take 1 tablet (100 mg total) by mouth once daily.    metoprolol tartrate (LOPRESSOR) 25 MG tablet TAKE 1 TABLET TWICE DAILY        While in the hospital, will manage blood pressure as follows; Continue home antihypertensive regimen

## 2022-03-06 LAB
ANISOCYTOSIS BLD QL SMEAR: SLIGHT
BASOPHILS # BLD AUTO: 0.01 K/UL (ref 0–0.2)
BASOPHILS NFR BLD: 0.1 % (ref 0–1.9)
DIFFERENTIAL METHOD: ABNORMAL
EOSINOPHIL # BLD AUTO: 0 K/UL (ref 0–0.5)
EOSINOPHIL NFR BLD: 0 % (ref 0–8)
ERYTHROCYTE [DISTWIDTH] IN BLOOD BY AUTOMATED COUNT: 13.8 % (ref 11.5–14.5)
HCT VFR BLD AUTO: 31.5 % (ref 37–48.5)
HGB BLD-MCNC: 9.8 G/DL (ref 12–16)
HYPOCHROMIA BLD QL SMEAR: ABNORMAL
IMM GRANULOCYTES # BLD AUTO: 0.05 K/UL (ref 0–0.04)
IMM GRANULOCYTES NFR BLD AUTO: 0.5 % (ref 0–0.5)
LYMPHOCYTES # BLD AUTO: 0.4 K/UL (ref 1–4.8)
LYMPHOCYTES NFR BLD: 3.8 % (ref 18–48)
MCH RBC QN AUTO: 26.9 PG (ref 27–31)
MCHC RBC AUTO-ENTMCNC: 31.1 G/DL (ref 32–36)
MCV RBC AUTO: 87 FL (ref 82–98)
MONOCYTES # BLD AUTO: 0.5 K/UL (ref 0.3–1)
MONOCYTES NFR BLD: 4.7 % (ref 4–15)
NEUTROPHILS # BLD AUTO: 8.8 K/UL (ref 1.8–7.7)
NEUTROPHILS NFR BLD: 90.9 % (ref 38–73)
NRBC BLD-RTO: 0 /100 WBC
PLATELET # BLD AUTO: 170 K/UL (ref 150–450)
PLATELET BLD QL SMEAR: ABNORMAL
PMV BLD AUTO: 10.7 FL (ref 9.2–12.9)
RBC # BLD AUTO: 3.64 M/UL (ref 4–5.4)
WBC # BLD AUTO: 9.65 K/UL (ref 3.9–12.7)

## 2022-03-06 PROCEDURE — 25000003 PHARM REV CODE 250: Performed by: NURSE PRACTITIONER

## 2022-03-06 PROCEDURE — 36569 INSJ PICC 5 YR+ W/O IMAGING: CPT

## 2022-03-06 PROCEDURE — C1751 CATH, INF, PER/CENT/MIDLINE: HCPCS

## 2022-03-06 PROCEDURE — 94761 N-INVAS EAR/PLS OXIMETRY MLT: CPT

## 2022-03-06 PROCEDURE — 25000003 PHARM REV CODE 250: Performed by: HOSPITALIST

## 2022-03-06 PROCEDURE — 36415 COLL VENOUS BLD VENIPUNCTURE: CPT | Performed by: HOSPITALIST

## 2022-03-06 PROCEDURE — 85025 COMPLETE CBC W/AUTO DIFF WBC: CPT | Performed by: HOSPITALIST

## 2022-03-06 PROCEDURE — 11000001 HC ACUTE MED/SURG PRIVATE ROOM

## 2022-03-06 PROCEDURE — 63600175 PHARM REV CODE 636 W HCPCS: Performed by: HOSPITALIST

## 2022-03-06 PROCEDURE — 27000207 HC ISOLATION

## 2022-03-06 RX ORDER — SODIUM CHLORIDE 0.9 % (FLUSH) 0.9 %
10 SYRINGE (ML) INJECTION
Status: DISCONTINUED | OUTPATIENT
Start: 2022-03-07 | End: 2022-03-07 | Stop reason: HOSPADM

## 2022-03-06 RX ORDER — SODIUM CHLORIDE 0.9 % (FLUSH) 0.9 %
10 SYRINGE (ML) INJECTION EVERY 6 HOURS
Status: DISCONTINUED | OUTPATIENT
Start: 2022-03-07 | End: 2022-03-07 | Stop reason: HOSPADM

## 2022-03-06 RX ADMIN — METOPROLOL TARTRATE 25 MG: 25 TABLET, FILM COATED ORAL at 09:03

## 2022-03-06 RX ADMIN — METRONIDAZOLE 500 MG: 500 TABLET ORAL at 09:03

## 2022-03-06 RX ADMIN — ENOXAPARIN SODIUM 40 MG: 100 INJECTION SUBCUTANEOUS at 04:03

## 2022-03-06 RX ADMIN — CEFEPIME 1 G: 1 INJECTION, POWDER, FOR SOLUTION INTRAMUSCULAR; INTRAVENOUS at 08:03

## 2022-03-06 RX ADMIN — METRONIDAZOLE 500 MG: 500 TABLET ORAL at 02:03

## 2022-03-06 RX ADMIN — AMLODIPINE BESYLATE 2.5 MG: 2.5 TABLET ORAL at 08:03

## 2022-03-06 RX ADMIN — OXYCODONE 15 MG: 5 TABLET ORAL at 05:03

## 2022-03-06 RX ADMIN — ATORVASTATIN CALCIUM 40 MG: 40 TABLET, FILM COATED ORAL at 09:03

## 2022-03-06 RX ADMIN — METOPROLOL TARTRATE 25 MG: 25 TABLET, FILM COATED ORAL at 08:03

## 2022-03-06 RX ADMIN — OXYCODONE 15 MG: 5 TABLET ORAL at 08:03

## 2022-03-06 RX ADMIN — METRONIDAZOLE 500 MG: 500 TABLET ORAL at 05:03

## 2022-03-06 RX ADMIN — CYANOCOBALAMIN TAB 1000 MCG 1000 MCG: 1000 TAB at 08:03

## 2022-03-06 RX ADMIN — LOSARTAN POTASSIUM 100 MG: 50 TABLET, FILM COATED ORAL at 08:03

## 2022-03-06 NOTE — ASSESSMENT & PLAN NOTE
CT abdomen/pelvis showed right kidney with multifocal areas of cortical thinning likely related to parenchymal scarring, karge staghorn calculus noted within the inferior pole of the right renal collecting system, and additional bilateral nonobstructing renal stones with stone burden unchanged when compared to the previous CT without hydronephrosis or hydroureter    Patient reports regularly seeing her urologist with next appointment 3/15/22    - Will follow up with urologist as an outpatient for consideration of nephrotomy given recalcitrant UTIs

## 2022-03-06 NOTE — PROGRESS NOTES
"[facility]  Progress Note    Patient Name: Patito Domingo  MRN: [id number]  Patient Class: IP- Inpatient   Admission Date: 3/1/2022  Length of Stay: 3 days  Attending Physician: Eleno Osborne, *  Primary Care Provider: Yasir Myers Jr, MD        Subjective:     Principal Problem:Choledocholithiasis        HPI:  Patito Domingo is a 72 yo female with a pmh of cholecystectomy, colon cancer, liver tumor, CVA, HTN, frequent UTI. She presented with abdominal pain that started yesterday evening. She described right sided abdominal pain into the RUQ that is worse when she bends forward. She denies fevers but does report chills yesterday. She takes narcotics for chronic pain due to colon cancer but states this pain was different than normal. She reports having her gall bladder removed a few years ago. No change in appetite. She also reports vaginal irritation and dysuria which has been ongoing. She was admitted here earlier this month for a UTI, treated with merrem. She is followed by urology outpatient. She had a UA done on 2/23/22 due to urinary symptoms and found to be positive for UTI. She was prescribed macrobid but did not take it because 'it does not work". Today in the ED, she was found to have a UTI. CT abdomen with concern for choledocholithiasis with similar to slightly increased intra and extrahepatic bile duct dilatation. The patient was given a dose of macrobid in the ED and neuroendocrine was consulted per ED provider. She was also hypertensive on arrival and was given hydralazine IV. No leukocytosis or  elevated LFTs on arrival.       Overview/Hospital Course:  Patient is 71 year old female admitted for choledocholithiasis and UTI. Started on IV meropenem. ID consulted for recommendations on duration of antibiotic course given history of recurrent UTIs. GI consulted, plan for MRCP. General surgery and neuroendocrine following, no surgical intervention planned. "       Interval History:  Abdominal pain has greatly improved.  Febrile last night, although this has resolved today.  Awaiting blood culture results.  Will discuss possible switch to p.o. therapy if cultures remain negative with Infectious Disease as patient feels uncomfortable administering IV antibiotics with assistance of home health service.    Review of Systems   Constitutional:  Negative for chills and fever.   Respiratory:  Negative for shortness of breath.    Gastrointestinal:  Positive for abdominal pain.   Genitourinary:  Positive for dysuria.   Neurological:  Positive for weakness.   Objective:     Vital Signs (Most Recent):  Temp: 99.4 °F (37.4 °C) (03/06/22 0819)  Pulse: 80 (03/06/22 0819)  Resp: 18 (03/06/22 0848)  BP: (!) 150/73 (03/06/22 0819)  SpO2: 97 % (03/06/22 0819)   Vital Signs (24h Range):  Temp:  [97.2 °F (36.2 °C)-103 °F (39.4 °C)] 99.4 °F (37.4 °C)  Pulse:  [75-90] 80  Resp:  [18-20] 18  SpO2:  [95 %-97 %] 97 %  BP: (145-181)/(64-78) 150/73     Weight: 79.5 kg (175 lb 4.3 oz)  Body mass index is 28.29 kg/m².    Intake/Output Summary (Last 24 hours) at 3/6/2022 1115  Last data filed at 3/5/2022 1900  Gross per 24 hour   Intake 93.18 ml   Output 100 ml   Net -6.82 ml        Physical Exam  Constitutional:       General: She is not in acute distress.     Appearance: Normal appearance. She is normal weight. She is not ill-appearing or toxic-appearing.   HENT:      Head: Normocephalic and atraumatic.      Mouth/Throat:      Mouth: Mucous membranes are moist.      Pharynx: Oropharynx is clear.   Eyes:      Extraocular Movements: Extraocular movements intact.      Pupils: Pupils are equal, round, and reactive to light.   Cardiovascular:      Rate and Rhythm: Normal rate and regular rhythm.      Pulses: Normal pulses.      Heart sounds: Normal heart sounds. No murmur heard.  Pulmonary:      Effort: Pulmonary effort is normal. No respiratory distress.      Breath sounds: Normal breath sounds.    Abdominal:      General: Abdomen is flat. Bowel sounds are normal.      Palpations: Abdomen is soft.      Tenderness: There is abdominal tenderness in the right upper quadrant and suprapubic area. There is no rebound.   Musculoskeletal:      Right lower leg: No edema.      Left lower leg: No edema.   Skin:     General: Skin is warm and dry.   Neurological:      Mental Status: She is alert and oriented to person, place, and time. Mental status is at baseline.   Psychiatric:         Mood and Affect: Mood normal.         Behavior: Behavior normal.         Thought Content: Thought content normal.         Judgment: Judgment normal.       Significant Labs: All pertinent labs within the past 24 hours have been reviewed.  BMP:   Recent Labs   Lab 03/04/22 2149   *      K 4.3      CO2 24   BUN 19   CREATININE 1.1   CALCIUM 8.9   MG 2.0       CBC:   Recent Labs   Lab 03/04/22 2149 03/06/22  0734   WBC 4.53 9.65   HGB 10.4* 9.8*   HCT 33.4* 31.5*    170       CMP:   Recent Labs   Lab 03/04/22 2149      K 4.3      CO2 24   *   BUN 19   CREATININE 1.1   CALCIUM 8.9   PROT 6.8   ALBUMIN 3.3*   BILITOT 0.8   ALKPHOS 382*   *   ALT 62*   ANIONGAP 9   EGFRNONAA 51*         Significant Imaging: I have reviewed all pertinent imaging results/findings within the past 24 hours.      Assessment/Plan:      * Choledocholithiasis  Patient complaining of RUQ pain     CT noted worsening intra and extrahepatic bile duct dilatation in setting of normal LFTs with concern for choledocolithiais. Previous cholecystectomy, ERCP for choledocolithiasis 11/2021   Afebrile without leukocytosis and normal lipase    General surgery consulted, no surgical intervention. GI consulted, appreciate recommendations    - Per GI,  low threshold for AB coverage if febrile or clinical decompensation, clinically not consistent with cholangitis at this time  - MRCP c/w choledocholithiasis; ERCP with octreotide  performed 3/4  - Surgery tentatively planning cytoreduction +/- choledochoduodenostomy, timing would be after completion of IV antibiotics  - antibiotic recommendations as below per Infectious Disease    Pyelonephritis  -see above    Dilation of biliary tract  See choledocholithiasis    Chronic diastolic heart failure with preserved ejection fraction  No acute symptoms indicative of HF exacerbation.  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 s/p TACE and open leslie in 2021 now with recurrent UTI and acute on chronic pain   No leukocytosis, elevated bilirubin or LFTs to suggest acute abdominal etiology      - Trend LFTs   - Octreotide coverage with ERCP    Fever  -following ERCP  -blood cultures pending  -continue IV antibiotics  -appears to be improving today      Impaired functional mobility, balance, gait, and endurance  Patient reports ambulating at home with walker    - PT/OT consulted, recommend HH OT and PT    Anemia of chronic disease  Chronic, stable    - Continue to monitor for intervention    Essential hypertension  Hypertensive in ED, improved with hydralazine 10mg IV     Chronic, controlled.  Latest blood pressure and vitals reviewed-   Temp:  [97.2 °F (36.2 °C)-103 °F (39.4 °C)]   Pulse:  [75-90]   Resp:  [18-20]   BP: (145-181)/(64-78)   SpO2:  [95 %-97 %] .   Home meds for hypertension were reviewed and noted below.   Hypertension Medications             losartan (COZAAR) 100 MG tablet Take 1 tablet (100 mg total) by mouth once daily.    metoprolol tartrate (LOPRESSOR) 25 MG tablet TAKE 1 TABLET TWICE DAILY        While in the hospital, will manage blood pressure as follows; Continue home antihypertensive regimen    Chronic pain syndrome  History of colon cancer, followed by oncologist. Reports talking oxycodone at home    -  Continue home dose oxycodone 15 mg q4 hrs PRN    Urinary tract infection without hematuria  History of UTIs. Prior cultures positive for klebsiella as well as multidrug resistant bacteria.    - Urine culture pending; hx of ESBL  - deescalated to cefepime and metronidazole per Infectious Disease; will need 14 day course  - ID consulted, appreciate recs    Nephrolithiasis  CT abdomen/pelvis showed right kidney with multifocal areas of cortical thinning likely related to parenchymal scarring, karge staghorn calculus noted within the inferior pole of the right renal collecting system, and additional bilateral nonobstructing renal stones with stone burden unchanged when compared to the previous CT without hydronephrosis or hydroureter    Patient reports regularly seeing her urologist with next appointment 3/15/22    - Will follow up with urologist as an outpatient for consideration of nephrotomy given recalcitrant UTIs    Neuroendocrine carcinoma, unknown primary site          VTE Risk Mitigation (From admission, onward)         Ordered     enoxaparin injection 40 mg  Daily         03/01/22 2321     IP VTE HIGH RISK PATIENT  Once         03/01/22 1635     Place sequential compression device  Until discontinued         03/01/22 1635                Discharge Planning   JULIO C: 3/7/2022     Code Status: Prior   Is the patient medically ready for discharge?:     Reason for patient still in hospital (select all that apply): Patient trending condition                     Eleno Osborne MD  Department of Hospital Medicine   [facility]

## 2022-03-06 NOTE — PLAN OF CARE
Discussed with ID and ok to switch abx to oral FQ (assuming blood cultures negative, patient defervesces, etc.) to thereby avoid IV antibiotics as patient does not feel comfortable with IV infusion and UCx reveal sensitivity to this therapy.    Eleno Osborne MD  [facility]

## 2022-03-06 NOTE — PROGRESS NOTES
[facility]/General Surgery  Progress Note    Admit Date: 3/1/2022  Hospital Day: 3  Procedure:   ERCP 3/4    Subjective:  Still with fever  Blood cx ordered  denies n/v  Passing flatus + BM  Pain improved tolerating diet      Physical Exam:  Gen: no acute distress. Alert and oriented x3.  HEENT: normocephalic and atraumatic. EOMI.   Resp: unlabored respirations  CV: regular rate, distal pulses intact  Abd: soft, non-tender, non-distended,   Ext: warm and well perfused  MSK: strength grossly intact  Neuro: no focal deficits, normal sensation    Labs reviewed below- pending  I/O (last 24 hours):  UOP: not recorded      Assessment/Plan: 71 y.o. female admitted 3/1/2022 with choledocolithiasis s/p ERCP    Plan is to finish abx then outpatient follow up for discussion of cytoreduction  Continue abx per primary    Alvaro Lugo MD   [facility] General Surgery, PGY4  03/06/2022       Inpatient Data     Vitals:   Temp:  [97.2 °F (36.2 °C)-103 °F (39.4 °C)] 99.4 °F (37.4 °C)  Pulse:  [68-90] 80  Resp:  [18-20] 20  SpO2:  [95 %-97 %] 97 %  BP: (145-181)/(64-78) 150/73 Intake/Output:  03/05 0701 - 03/06 0700  In: 93.2 [I.V.:93.2]  Out: 100 [Urine:100]       Recent Labs     03/04/22  2149 03/06/22  0734   WBC 4.53 9.65   HGB 10.4* 9.8*   HCT 33.4* 31.5*    170     --    K 4.3  --      --    CO2 24  --    BUN 19  --    CREATININE 1.1  --    BILITOT 0.8  --    *  --    ALT 62*  --    ALKPHOS 382*  --    CALCIUM 8.9  --    ALBUMIN 3.3*  --    PROT 6.8  --    MG 2.0  --    PHOS 3.3  --         Scheduled Meds:   amLODIPine  2.5 mg Oral Daily    atorvastatin  40 mg Oral QHS    ceFEPime (MAXIPIME) IVPB  1 g Intravenous Q12H    cyanocobalamin  1,000 mcg Oral Daily    enoxaparin  40 mg Subcutaneous Daily    losartan  100 mg Oral Daily    metoprolol tartrate  25 mg Oral BID    metroNIDAZOLE  500 mg Oral Q8H     Continuous Infusions:  PRN Meds:acetaminophen, dextrose 10%, dextrose 10%, glucagon  (human recombinant), glucose, glucose, melatonin, naloxone, ondansetron, oxyCODONE, sodium chloride 0.9%

## 2022-03-06 NOTE — SUBJECTIVE & OBJECTIVE
Interval History:  Abdominal pain has greatly improved.  Febrile last night, although this has resolved today.  Awaiting blood culture results.  Will discuss possible switch to p.o. therapy if cultures remain negative with Infectious Disease as patient feels uncomfortable administering IV antibiotics with assistance of home health service.    Review of Systems   Constitutional:  Negative for chills and fever.   Respiratory:  Negative for shortness of breath.    Gastrointestinal:  Positive for abdominal pain.   Genitourinary:  Positive for dysuria.   Neurological:  Positive for weakness.   Objective:     Vital Signs (Most Recent):  Temp: 99.4 °F (37.4 °C) (03/06/22 0819)  Pulse: 80 (03/06/22 0819)  Resp: 18 (03/06/22 0848)  BP: (!) 150/73 (03/06/22 0819)  SpO2: 97 % (03/06/22 0819)   Vital Signs (24h Range):  Temp:  [97.2 °F (36.2 °C)-103 °F (39.4 °C)] 99.4 °F (37.4 °C)  Pulse:  [75-90] 80  Resp:  [18-20] 18  SpO2:  [95 %-97 %] 97 %  BP: (145-181)/(64-78) 150/73     Weight: 79.5 kg (175 lb 4.3 oz)  Body mass index is 28.29 kg/m².    Intake/Output Summary (Last 24 hours) at 3/6/2022 1115  Last data filed at 3/5/2022 1900  Gross per 24 hour   Intake 93.18 ml   Output 100 ml   Net -6.82 ml        Physical Exam  Constitutional:       General: She is not in acute distress.     Appearance: Normal appearance. She is normal weight. She is not ill-appearing or toxic-appearing.   HENT:      Head: Normocephalic and atraumatic.      Mouth/Throat:      Mouth: Mucous membranes are moist.      Pharynx: Oropharynx is clear.   Eyes:      Extraocular Movements: Extraocular movements intact.      Pupils: Pupils are equal, round, and reactive to light.   Cardiovascular:      Rate and Rhythm: Normal rate and regular rhythm.      Pulses: Normal pulses.      Heart sounds: Normal heart sounds. No murmur heard.  Pulmonary:      Effort: Pulmonary effort is normal. No respiratory distress.      Breath sounds: Normal breath sounds.   Abdominal:       General: Abdomen is flat. Bowel sounds are normal.      Palpations: Abdomen is soft.      Tenderness: There is abdominal tenderness in the right upper quadrant and suprapubic area. There is no rebound.   Musculoskeletal:      Right lower leg: No edema.      Left lower leg: No edema.   Skin:     General: Skin is warm and dry.   Neurological:      Mental Status: She is alert and oriented to person, place, and time. Mental status is at baseline.   Psychiatric:         Mood and Affect: Mood normal.         Behavior: Behavior normal.         Thought Content: Thought content normal.         Judgment: Judgment normal.       Significant Labs: All pertinent labs within the past 24 hours have been reviewed.  BMP:   Recent Labs   Lab 03/04/22 2149   *      K 4.3      CO2 24   BUN 19   CREATININE 1.1   CALCIUM 8.9   MG 2.0       CBC:   Recent Labs   Lab 03/04/22 2149 03/06/22  0734   WBC 4.53 9.65   HGB 10.4* 9.8*   HCT 33.4* 31.5*    170       CMP:   Recent Labs   Lab 03/04/22 2149      K 4.3      CO2 24   *   BUN 19   CREATININE 1.1   CALCIUM 8.9   PROT 6.8   ALBUMIN 3.3*   BILITOT 0.8   ALKPHOS 382*   *   ALT 62*   ANIONGAP 9   EGFRNONAA 51*         Significant Imaging: I have reviewed all pertinent imaging results/findings within the past 24 hours.

## 2022-03-06 NOTE — NURSING
/78 Temp 103.0  MD notified. Amlodipine and tylenol administered. Removed blankets, gave ice packs. Rechecked vitals 45min later BP is now 153/67. Temp 103.8 will continue to monitor.

## 2022-03-06 NOTE — ASSESSMENT & PLAN NOTE
Hypertensive in ED, improved with hydralazine 10mg IV     Chronic, controlled.  Latest blood pressure and vitals reviewed-   Temp:  [97.2 °F (36.2 °C)-103 °F (39.4 °C)]   Pulse:  [75-90]   Resp:  [18-20]   BP: (145-181)/(64-78)   SpO2:  [95 %-97 %] .   Home meds for hypertension were reviewed and noted below.   Hypertension Medications             losartan (COZAAR) 100 MG tablet Take 1 tablet (100 mg total) by mouth once daily.    metoprolol tartrate (LOPRESSOR) 25 MG tablet TAKE 1 TABLET TWICE DAILY        While in the hospital, will manage blood pressure as follows; Continue home antihypertensive regimen

## 2022-03-06 NOTE — ASSESSMENT & PLAN NOTE
-following ERCP  -blood cultures pending  -continue IV antibiotics  -appears to be improving today

## 2022-03-06 NOTE — NURSING
Pt AAOx4. Head to toe assessment complete. Pt does complain of 5/10 pain PRN oxy given. Vitals are stable, pt resting in bed watching television. Contact precautions still intact. Meds administered per mar. Encouraged to call for help for assistance. Call light in reach, bed in lowest position and locked. Will continue to monitor.

## 2022-03-06 NOTE — ASSESSMENT & PLAN NOTE
Patient complaining of RUQ pain     CT noted worsening intra and extrahepatic bile duct dilatation in setting of normal LFTs with concern for choledocolithiais. Previous cholecystectomy, ERCP for choledocolithiasis 11/2021   Afebrile without leukocytosis and normal lipase    General surgery consulted, no surgical intervention. GI consulted, appreciate recommendations    - Per GI,  low threshold for AB coverage if febrile or clinical decompensation, clinically not consistent with cholangitis at this time  - MRCP c/w choledocholithiasis; ERCP with octreotide performed 3/4  - Surgery tentatively planning cytoreduction +/- choledochoduodenostomy, timing would be after completion of IV antibiotics  - antibiotic recommendations as below per Infectious Disease

## 2022-03-07 ENCOUNTER — PATIENT OUTREACH (OUTPATIENT)
Dept: ADMINISTRATIVE | Facility: OTHER | Age: 72
End: 2022-03-07
Payer: MEDICARE

## 2022-03-07 VITALS
OXYGEN SATURATION: 97 % | TEMPERATURE: 98 F | DIASTOLIC BLOOD PRESSURE: 62 MMHG | WEIGHT: 175.25 LBS | BODY MASS INDEX: 28.16 KG/M2 | SYSTOLIC BLOOD PRESSURE: 141 MMHG | HEIGHT: 66 IN | HEART RATE: 71 BPM | RESPIRATION RATE: 19 BRPM

## 2022-03-07 LAB
ALBUMIN SERPL BCP-MCNC: 2.9 G/DL (ref 3.5–5.2)
ALP SERPL-CCNC: 302 U/L (ref 55–135)
ALT SERPL W/O P-5'-P-CCNC: 72 U/L (ref 10–44)
ANION GAP SERPL CALC-SCNC: 5 MMOL/L (ref 8–16)
AST SERPL-CCNC: 50 U/L (ref 10–40)
BASOPHILS # BLD AUTO: 0.02 K/UL (ref 0–0.2)
BASOPHILS NFR BLD: 0.4 % (ref 0–1.9)
BILIRUB SERPL-MCNC: 0.8 MG/DL (ref 0.1–1)
BUN SERPL-MCNC: 19 MG/DL (ref 8–23)
CALCIUM SERPL-MCNC: 8.6 MG/DL (ref 8.7–10.5)
CHLORIDE SERPL-SCNC: 106 MMOL/L (ref 95–110)
CO2 SERPL-SCNC: 28 MMOL/L (ref 23–29)
CREAT SERPL-MCNC: 1 MG/DL (ref 0.5–1.4)
DIFFERENTIAL METHOD: ABNORMAL
EOSINOPHIL # BLD AUTO: 0.1 K/UL (ref 0–0.5)
EOSINOPHIL NFR BLD: 1.6 % (ref 0–8)
ERYTHROCYTE [DISTWIDTH] IN BLOOD BY AUTOMATED COUNT: 13.7 % (ref 11.5–14.5)
EST. GFR  (AFRICAN AMERICAN): >60 ML/MIN/1.73 M^2
EST. GFR  (NON AFRICAN AMERICAN): 58 ML/MIN/1.73 M^2
GLUCOSE SERPL-MCNC: 136 MG/DL (ref 70–110)
HCT VFR BLD AUTO: 30.3 % (ref 37–48.5)
HGB BLD-MCNC: 9.4 G/DL (ref 12–16)
IMM GRANULOCYTES # BLD AUTO: 0.03 K/UL (ref 0–0.04)
IMM GRANULOCYTES NFR BLD AUTO: 0.6 % (ref 0–0.5)
LYMPHOCYTES # BLD AUTO: 0.6 K/UL (ref 1–4.8)
LYMPHOCYTES NFR BLD: 12 % (ref 18–48)
MCH RBC QN AUTO: 27 PG (ref 27–31)
MCHC RBC AUTO-ENTMCNC: 31 G/DL (ref 32–36)
MCV RBC AUTO: 87 FL (ref 82–98)
MONOCYTES # BLD AUTO: 0.5 K/UL (ref 0.3–1)
MONOCYTES NFR BLD: 9.3 % (ref 4–15)
NEUTROPHILS # BLD AUTO: 3.9 K/UL (ref 1.8–7.7)
NEUTROPHILS NFR BLD: 76.1 % (ref 38–73)
NRBC BLD-RTO: 0 /100 WBC
PLATELET # BLD AUTO: 177 K/UL (ref 150–450)
PMV BLD AUTO: 10.6 FL (ref 9.2–12.9)
POTASSIUM SERPL-SCNC: 3.7 MMOL/L (ref 3.5–5.1)
PROT SERPL-MCNC: 6.2 G/DL (ref 6–8.4)
RBC # BLD AUTO: 3.48 M/UL (ref 4–5.4)
SODIUM SERPL-SCNC: 139 MMOL/L (ref 136–145)
WBC # BLD AUTO: 5.08 K/UL (ref 3.9–12.7)

## 2022-03-07 PROCEDURE — 94760 N-INVAS EAR/PLS OXIMETRY 1: CPT

## 2022-03-07 PROCEDURE — 80053 COMPREHEN METABOLIC PANEL: CPT | Performed by: SURGERY

## 2022-03-07 PROCEDURE — 25000003 PHARM REV CODE 250: Performed by: HOSPITALIST

## 2022-03-07 PROCEDURE — 25000003 PHARM REV CODE 250

## 2022-03-07 PROCEDURE — 25000003 PHARM REV CODE 250: Performed by: NURSE PRACTITIONER

## 2022-03-07 PROCEDURE — 63600175 PHARM REV CODE 636 W HCPCS: Performed by: HOSPITALIST

## 2022-03-07 PROCEDURE — 94761 N-INVAS EAR/PLS OXIMETRY MLT: CPT

## 2022-03-07 PROCEDURE — A4216 STERILE WATER/SALINE, 10 ML: HCPCS | Performed by: HOSPITALIST

## 2022-03-07 PROCEDURE — 85025 COMPLETE CBC W/AUTO DIFF WBC: CPT

## 2022-03-07 RX ORDER — MUPIROCIN 20 MG/G
OINTMENT TOPICAL 2 TIMES DAILY
Status: DISCONTINUED | OUTPATIENT
Start: 2022-03-07 | End: 2022-03-07 | Stop reason: HOSPADM

## 2022-03-07 RX ORDER — METRONIDAZOLE 500 MG/1
500 TABLET ORAL EVERY 8 HOURS
Qty: 33 TABLET | Refills: 0 | Status: SHIPPED | OUTPATIENT
Start: 2022-03-08 | End: 2022-03-19

## 2022-03-07 RX ORDER — CIPROFLOXACIN 750 MG/1
750 TABLET, FILM COATED ORAL EVERY 12 HOURS
Qty: 22 TABLET | Refills: 0 | Status: SHIPPED | OUTPATIENT
Start: 2022-03-08 | End: 2022-03-19

## 2022-03-07 RX ORDER — AMOXICILLIN 250 MG
1 CAPSULE ORAL DAILY PRN
Status: DISCONTINUED | OUTPATIENT
Start: 2022-03-07 | End: 2022-03-07 | Stop reason: HOSPADM

## 2022-03-07 RX ORDER — AMLODIPINE BESYLATE 2.5 MG/1
2.5 TABLET ORAL DAILY
Qty: 30 TABLET | Refills: 11 | Status: ON HOLD | OUTPATIENT
Start: 2022-03-08 | End: 2022-04-01 | Stop reason: HOSPADM

## 2022-03-07 RX ORDER — AMOXICILLIN 250 MG
1 CAPSULE ORAL DAILY PRN
Status: DISCONTINUED | OUTPATIENT
Start: 2022-03-07 | End: 2022-03-07

## 2022-03-07 RX ADMIN — METRONIDAZOLE 500 MG: 500 TABLET ORAL at 05:03

## 2022-03-07 RX ADMIN — CYANOCOBALAMIN TAB 1000 MCG 1000 MCG: 1000 TAB at 08:03

## 2022-03-07 RX ADMIN — AMLODIPINE BESYLATE 2.5 MG: 2.5 TABLET ORAL at 08:03

## 2022-03-07 RX ADMIN — OXYCODONE 15 MG: 5 TABLET ORAL at 04:03

## 2022-03-07 RX ADMIN — MUPIROCIN: 20 OINTMENT TOPICAL at 08:03

## 2022-03-07 RX ADMIN — SODIUM CHLORIDE, PRESERVATIVE FREE 10 ML: 5 INJECTION INTRAVENOUS at 06:03

## 2022-03-07 RX ADMIN — ATORVASTATIN CALCIUM 40 MG: 40 TABLET, FILM COATED ORAL at 08:03

## 2022-03-07 RX ADMIN — LOSARTAN POTASSIUM 100 MG: 50 TABLET, FILM COATED ORAL at 08:03

## 2022-03-07 RX ADMIN — CEFEPIME 1 G: 1 INJECTION, POWDER, FOR SOLUTION INTRAMUSCULAR; INTRAVENOUS at 11:03

## 2022-03-07 RX ADMIN — METRONIDAZOLE 500 MG: 500 TABLET ORAL at 02:03

## 2022-03-07 RX ADMIN — OXYCODONE 15 MG: 5 TABLET ORAL at 08:03

## 2022-03-07 RX ADMIN — OXYCODONE 15 MG: 5 TABLET ORAL at 12:03

## 2022-03-07 RX ADMIN — METOPROLOL TARTRATE 25 MG: 25 TABLET, FILM COATED ORAL at 08:03

## 2022-03-07 RX ADMIN — METRONIDAZOLE 500 MG: 500 TABLET ORAL at 08:03

## 2022-03-07 RX ADMIN — DOCUSATE SODIUM AND SENNOSIDES 1 TABLET: 8.6; 5 TABLET, FILM COATED ORAL at 08:03

## 2022-03-07 RX ADMIN — CEFEPIME 1 G: 1 INJECTION, POWDER, FOR SOLUTION INTRAMUSCULAR; INTRAVENOUS at 01:03

## 2022-03-07 NOTE — PROCEDURES
"Patito Domingo is a 71 y.o. female patient.    Temp: 98.2 °F (36.8 °C) (03/06/22 2020)  Pulse: 67 (03/06/22 2020)  Resp: 18 (03/06/22 2020)  BP: (!) 164/68 (03/06/22 2020)  SpO2: 97 % (03/06/22 2020)  Weight: 79.5 kg (175 lb 4.3 oz) (03/06/22 0020)  Height: 5' 6" (167.6 cm) (03/01/22 2146)    PICC  Date/Time: 3/6/2022 10:55 PM  Performed by: Jeferson Colon RN  Consent Done: Yes  Time out: Immediately prior to procedure a time out was called to verify the correct patient, procedure, equipment, support staff and site/side marked as required  Indications: med administration and vascular access  Anesthesia: local infiltration  Local anesthetic: lidocaine 1% without epinephrine  Anesthetic Total (mL): 5  Preparation: skin prepped with ChloraPrep  Skin prep agent dried: skin prep agent completely dried prior to procedure  Sterile barriers: all five maximum sterile barriers used - cap, mask, sterile gown, sterile gloves, and large sterile sheet  Hand hygiene: hand hygiene performed prior to central venous catheter insertion  Location details: right brachial  Catheter type: double lumen  Catheter size: 5 Fr  Catheter Length: 36cm    Ultrasound guidance: yes  Vessel Caliber: medium, compressibility normal  Needle advanced into vessel with real time Ultrasound guidance.  Guidewire confirmed in vessel.  Sterile sheath used.  Number of attempts: 1  Post-procedure: blood return through all ports, chlorhexidine patch and sterile dressing applied            Name jeferson colon  3/6/2022  " If you had a biopsy, you should not take aspirin or aspirin like products for the next 10 days unless instructed to do so by your doctor. If you had a biopsy, check with your doctor before taking any blood thinners such as warfarin (Coumadin).

## 2022-03-07 NOTE — SUBJECTIVE & OBJECTIVE
Interval History: sBP 150-170s with other VSS. Afebrile over past 24 hours. Complaint of abdominal pain, denies dysuria. Switch from cefepime to ciprofloxacin. Expected discharge later today, pending no growth of BC.    Review of Systems   Constitutional:  Positive for fatigue. Negative for chills and fever.   HENT:  Negative for congestion, rhinorrhea, sore throat and trouble swallowing.    Eyes:  Negative for visual disturbance.   Respiratory:  Negative for chest tightness and shortness of breath.    Cardiovascular:  Negative for chest pain, palpitations and leg swelling.   Gastrointestinal:  Positive for abdominal pain. Negative for diarrhea, nausea and vomiting.   Endocrine: Negative for polyuria.   Genitourinary:  Negative for dysuria, hematuria and urgency.   Skin:  Negative for color change and pallor.   Neurological:  Negative for dizziness, speech difficulty, weakness, light-headedness, numbness and headaches.   Psychiatric/Behavioral:  Negative for agitation, confusion and decreased concentration. The patient is not nervous/anxious.    Objective:     Vital Signs (Most Recent):  Temp: 98.5 °F (36.9 °C) (03/07/22 0738)  Pulse: 68 (03/07/22 0816)  Resp: 16 (03/07/22 0816)  BP: (!) 173/70 (03/07/22 0738)  SpO2: 98 % (03/07/22 0816)   Vital Signs (24h Range):  Temp:  [98.2 °F (36.8 °C)-98.8 °F (37.1 °C)] 98.5 °F (36.9 °C)  Pulse:  [58-71] 68  Resp:  [16-19] 16  SpO2:  [97 %-100 %] 98 %  BP: (124-173)/(66-78) 173/70     Weight: 79.5 kg (175 lb 4.3 oz)  Body mass index is 28.29 kg/m².    Intake/Output Summary (Last 24 hours) at 3/7/2022 1146  Last data filed at 3/7/2022 0900  Gross per 24 hour   Intake --   Output 300 ml   Net -300 ml      Physical Exam  Constitutional:       General: She is not in acute distress.     Appearance: Normal appearance. She is normal weight. She is not ill-appearing or toxic-appearing.   HENT:      Head: Normocephalic and atraumatic.      Mouth/Throat:      Mouth: Mucous membranes are  moist.      Pharynx: Oropharynx is clear.   Eyes:      Extraocular Movements: Extraocular movements intact.      Pupils: Pupils are equal, round, and reactive to light.   Cardiovascular:      Rate and Rhythm: Normal rate and regular rhythm.      Pulses: Normal pulses.      Heart sounds: Normal heart sounds.   Pulmonary:      Effort: Pulmonary effort is normal.      Breath sounds: Normal breath sounds.   Abdominal:      General: Abdomen is flat. Bowel sounds are normal. There is no distension.      Palpations: Abdomen is soft.      Tenderness: There is abdominal tenderness in the right upper quadrant. There is no guarding or rebound.   Musculoskeletal:      Right lower leg: No edema.      Left lower leg: No edema.   Skin:     General: Skin is warm and dry.   Neurological:      Mental Status: She is alert and oriented to person, place, and time. Mental status is at baseline.   Psychiatric:         Mood and Affect: Mood normal.         Behavior: Behavior normal.         Thought Content: Thought content normal.         Judgment: Judgment normal.       Significant Labs: All pertinent labs within the past 24 hours have been reviewed.  BMP:   Recent Labs   Lab 03/07/22  0826   *      K 3.7      CO2 28   BUN 19   CREATININE 1.0   CALCIUM 8.6*     CBC:   Recent Labs   Lab 03/06/22  0734 03/07/22  0826   WBC 9.65 5.08   HGB 9.8* 9.4*   HCT 31.5* 30.3*    177     CMP:   Recent Labs   Lab 03/07/22  0826      K 3.7      CO2 28   *   BUN 19   CREATININE 1.0   CALCIUM 8.6*   PROT 6.2   ALBUMIN 2.9*   BILITOT 0.8   ALKPHOS 302*   AST 50*   ALT 72*   ANIONGAP 5*   EGFRNONAA 58*       Significant Imaging: I have reviewed all pertinent imaging results/findings within the past 24 hours.

## 2022-03-07 NOTE — PT/OT/SLP PROGRESS
Physical Therapy      Patient Name:  Patito Domingo   MRN:  [id number]    Patient not seen today secondary to  (pt awaiting discharge home). Will follow-up n/a.

## 2022-03-07 NOTE — PLAN OF CARE
pt for discharge to home today - son is unable to pick her up - pt requests WC transport to home - she will be able to enter her home.    Future Appointments   Date Time Provider Department Center   3/9/2022  9:15 AM [facility] US1 JUAN EPIQ [facility]   3/14/2022  9:00 AM [facility] [place]   3/15/2022  9:15 AM Ervin Parikh MD [facility] UROLOGY [place] [facility]   3/28/2022 10:00 AM Chris Herbert MD [facility] TUMOR [place] [facility]     HH orders sent to all [facility]  agencies in the area per [employer] - no response - pt is, however, current with agency.  Offices closed at 4:30 pm - [place] will confirm receipt of HH orders in am.       WC Van scheduled for 5pm - lab results will be finalized by then.         03/07/22 1722   Final Note   Assessment Type Final Discharge Note   Anticipated Discharge Disposition Home-Health  ([facility] )   What phone number can be called within the next 1-3 days to see how you are doing after discharge? [phone]   Hospital Resources/Appts/Education Provided Appointments scheduled and added to AVS   Post-Acute Status   Post-Acute Authorization Home Health   Home Health Status Referrals Sent   Discharge Delays None known at this time

## 2022-03-07 NOTE — ASSESSMENT & PLAN NOTE
-following ERCP  -blood cultures pending, NGTD  -continue IV antibiotics  - Afebrile last 24 hours

## 2022-03-07 NOTE — CONSULTS
[facility] Medicine  Virtual Tele-Consult Note  [facility]      Patient Name: Patito Domingo  MRN:  [id number]  Hospital Medicine Team: Networked reference to record PCT  Helena Petersen MD  Date of Admission:  3/1/2022     Length of Stay:  LOS: 3 days   Expected Discharge Date: 3/7/2022  Principal Problem:  Choledocholithiasis        Thank you for your consult to [facility]. We have reviewed the patient's chart. Patito Domingo does not meet criteria for [facility] medicine service at this time due to the need for bedside physical exams - noted to have abdominal tenderness on palpation today.  Please reconsult when more appropriate for telemedicine visits.      Helena Petersen MD  [facility] Medicine

## 2022-03-07 NOTE — PROGRESS NOTES
[facility]ocrine [facility]/General Surgery  Progress Note    Admit Date: 3/1/2022  Hospital Day: 4  Procedure:   ERCP 3/4/2022    Subjective:  NAEON  Blood cx x2 ngtd  Passing flatus + BM  Pain improved tolerating diet  On abx for urine cultures      Physical Exam:  Gen: no acute distress. Alert and oriented x3.  HEENT: normocephalic and atraumatic. EOMI.   Resp: unlabored respirations  CV: regular rate, distal pulses intact  Abd: soft, non-distended, RUQ tender to palpation improved   Ext: warm and well perfused  MSK: strength grossly intact  Neuro: no focal deficits, normal sensation    Labs reviewed below- pending  I/O (last 24 hours):  UOP: not recorded      Assessment/Plan: 71 y.o. female admitted 3/1/2022 with choledocolithiasis s/p ERCP on 3/4/2022. Urine culture growing hatria    Plan is to finish abx then outpatient follow up for discussion of cytoreduction  Continue abx per primary  Continue medical management per primary    Kalina Turner MD   [facility]  HO2  03/07/2022       Inpatient Data     Vitals:   Temp:  [98.2 °F (36.8 °C)-99.4 °F (37.4 °C)] 98.2 °F (36.8 °C)  Pulse:  [58-80] 58  Resp:  [17-20] 18  SpO2:  [97 %-100 %] 98 %  BP: (124-164)/(66-78) 151/67 Intake/Output:  No intake/output data recorded.       Recent Labs     03/04/22  2149 03/06/22  0734   WBC 4.53 9.65   HGB 10.4* 9.8*   HCT 33.4* 31.5*    170     --    K 4.3  --      --    CO2 24  --    BUN 19  --    CREATININE 1.1  --    BILITOT 0.8  --    *  --    ALT 62*  --    ALKPHOS 382*  --    CALCIUM 8.9  --    ALBUMIN 3.3*  --    PROT 6.8  --    MG 2.0  --    PHOS 3.3  --         Scheduled Meds:   amLODIPine  2.5 mg Oral Daily    atorvastatin  40 mg Oral QHS    ceFEPime (MAXIPIME) IVPB  1 g Intravenous Q12H    cyanocobalamin  1,000 mcg Oral Daily    enoxaparin  40 mg Subcutaneous Daily    losartan  100 mg Oral Daily    metoprolol tartrate  25 mg Oral BID    metroNIDAZOLE  500 mg Oral Q8H    sodium chloride 0.9%   10 mL Intravenous Q6H     Continuous Infusions:  PRN Meds:acetaminophen, dextrose 10%, dextrose 10%, glucagon (human recombinant), glucose, glucose, melatonin, naloxone, ondansetron, oxyCODONE, sodium chloride 0.9%, Flushing PICC Protocol **AND** sodium chloride 0.9% **AND** sodium chloride 0.9%

## 2022-03-07 NOTE — PROGRESS NOTES
Future Appointments   Date Time Provider Department Center   3/9/2022  9:15 AM [facility]UAN [facility]Q [facility]   3/14/2022  9:00 AM Yasir Myers Jr[facility] [place]   3/15/2022  9:15 AM Ervin Parikh MD [facility] UROLOGY [place] [facility]   3/28/2022 10:00 AM Chris Herbert MD [facility] TUMOR [place] [facility]

## 2022-03-07 NOTE — PLAN OF CARE
[facility] [facility] Surg      HOME HEALTH ORDERS  FACE TO FACE ENCOUNTER    Patient Name: Patito Domingo  YOB: 1950    PCP: Yasir Myers Jr, MD   PCP Address: [address] [place] [address] / [id number]  PCP Phone Number: [phone]  PCP Fax: [phone]    Encounter Date: 3/1/22    Admit to Home Health    Diagnoses:  Active Hospital Problems    Diagnosis  POA    *Choledocholithiasis [K80.50]  Yes     Priority: 1 - High    Pyelonephritis [N12]  Yes    Chronic diastolic heart failure with preserved ejection fraction [I50.32]  Yes    Dilation of biliary tract [K83.8]  Yes    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       Follow Up Appointments:  Future Appointments   Date Time Provider Department Center   3/9/2022  9:15 AM [facility] US JUAN EPIQ [facility] UL[facility]   3/14/2022  9:00 AM Yasir Myers Jr., [place] [facility] [place]   3/15/2022  9:15 AM Ervin Parikh MD [facility] UROLOGY [place] [facility]       Allergies:  Review of patient's allergies indicates:   Allergen Reactions    Contrast media Hives, Itching and Swelling    Epinephrine Anaphylaxis     Can cause  a Carcinoid Crisis    Ibuprofen Hives, Itching and Swelling    Zofran [ondansetron hcl] Itching     And multiple other reactions    Iodinated contrast media     Sulfa (sulfonamide antibiotics) Hives, Itching and Swelling       Medications: Review discharge medications with patient and family and provide education.      I have seen and  examined this patient within the last 30 days. My clinical findings that support the need for the home health skilled services and home bound status are the following:no   Weakness/numbness causing balance and gait disturbance due to Weakness/Debility making it taxing to leave home.     Diet:   regular diet    Labs:  None    Referrals/ Consults  Physical Therapy to evaluate and treat. Evaluate for home safety and equipment needs; Establish/upgrade home exercise program. Perform / instruct on therapeutic exercises, gait training, transfer training, and Range of Motion.  Occupational Therapy to evaluate and treat. Evaluate home environment for safety and equipment needs. Perform/Instruct on transfers, ADL training, ROM, and therapeutic exercises.    Activities:   activity as tolerated    Nursing:   None    Miscellaneous   Home Oxygen:  No change    Home Health Aide:  Physical Therapy Twice weekly and Occupational Therapy Twice weekly    Wound Care Orders  no    I certify that this patient is confined to her home and needs physical therapy and occupational therapy.

## 2022-03-07 NOTE — PLAN OF CARE
VN reviewed discharge instructions with pt. Using teach back method.  AVS printed and handed to pt by bedside nurse.  Reviewed follow-up appointments, medications, diet, and importance of medication compliance.  Reviewed home care instructions, treatment plan, self-management, and when to seek medical attention.  Allowed time for questions.  All questions answered.  Patient verbalized complete understanding of discharge instructions and voices no concerns.     Discharge instructions complete.  Bedside delivery done.  Pt waiting on WC van. .  Bedside nurse notified.

## 2022-03-07 NOTE — ASSESSMENT & PLAN NOTE
Hypertensive in ED, improved with hydralazine 10mg IV     Chronic, controlled.  Latest blood pressure and vitals reviewed-   Temp:  [98.2 °F (36.8 °C)-98.8 °F (37.1 °C)]   Pulse:  [58-71]   Resp:  [16-19]   BP: (124-173)/(66-70)   SpO2:  [97 %-100 %] .   Home meds for hypertension were reviewed and noted below.   Hypertension Medications             losartan (COZAAR) 100 MG tablet Take 1 tablet (100 mg total) by mouth once daily.    metoprolol tartrate (LOPRESSOR) 25 MG tablet TAKE 1 TABLET TWICE DAILY        While in the hospital, will manage blood pressure as follows; Continue home antihypertensive regimen     - Continue Amlodipine 2.5 mg QD

## 2022-03-07 NOTE — PROGRESS NOTES
"[facility] Medicine  Progress Note    Patient Name: Patito Domingo  MRN: [id number]  Patient Class: IP- Inpatient   Admission Date: 3/1/2022  Length of Stay: 4 days  Attending Physician: Eleno Osborne, *  Primary Care Provider: Yasir Myers Jr, MD        Subjective:     Principal Problem:Choledocholithiasis        HPI:  Patito Domingo is a 72 yo female with a pmh of cholecystectomy, colon cancer, liver tumor, CVA, HTN, frequent UTI. She presented with abdominal pain that started yesterday evening. She described right sided abdominal pain into the RUQ that is worse when she bends forward. She denies fevers but does report chills yesterday. She takes narcotics for chronic pain due to colon cancer but states this pain was different than normal. She reports having her gall bladder removed a few years ago. No change in appetite. She also reports vaginal irritation and dysuria which has been ongoing. She was admitted here earlier this month for a UTI, treated with merrem. She is followed by urology outpatient. She had a UA done on 2/23/22 due to urinary symptoms and found to be positive for UTI. She was prescribed macrobid but did not take it because 'it does not work". Today in the ED, she was found to have a UTI. CT abdomen with concern for choledocholithiasis with similar to slightly increased intra and extrahepatic bile duct dilatation. The patient was given a dose of macrobid in the ED and neuroendocrine was consulted per ED provider. She was also hypertensive on arrival and was given hydralazine IV. No leukocytosis or  elevated LFTs on arrival.       Overview/Hospital Course:  Patient is 71 year old female admitted for choledocholithiasis and UTI. Started on IV meropenem. ID consulted and recommended to transition to cefepime and flagyl. GI consulted, MRCP and ERCP completed. General surgery and neuroendocrine following, no surgical intervention planned. Urine culture with >100,000 " CFU of Hafnei Alvei that is pansensitive. Will be discharged home on 12 day course of Cipro and Flagyl. Hypertensive upon admission, home BP meds resumed with addition of amlodipine. Will follow up with PCP, neuroendocrinology, and urology as outpatient.       Interval History: sBP 150-170s with other VSS. Afebrile over past 24 hours. Complaint of abdominal pain, denies dysuria. Switch from cefepime to ciprofloxacin. Expected discharge later today, pending no growth of BC.    Review of Systems   Constitutional:  Positive for fatigue. Negative for chills and fever.   HENT:  Negative for congestion, rhinorrhea, sore throat and trouble swallowing.    Eyes:  Negative for visual disturbance.   Respiratory:  Negative for chest tightness and shortness of breath.    Cardiovascular:  Negative for chest pain, palpitations and leg swelling.   Gastrointestinal:  Positive for abdominal pain. Negative for diarrhea, nausea and vomiting.   Endocrine: Negative for polyuria.   Genitourinary:  Negative for dysuria, hematuria and urgency.   Skin:  Negative for color change and pallor.   Neurological:  Negative for dizziness, speech difficulty, weakness, light-headedness, numbness and headaches.   Psychiatric/Behavioral:  Negative for agitation, confusion and decreased concentration. The patient is not nervous/anxious.    Objective:     Vital Signs (Most Recent):  Temp: 98.5 °F (36.9 °C) (03/07/22 0738)  Pulse: 68 (03/07/22 0816)  Resp: 16 (03/07/22 0816)  BP: (!) 173/70 (03/07/22 0738)  SpO2: 98 % (03/07/22 0816)   Vital Signs (24h Range):  Temp:  [98.2 °F (36.8 °C)-98.8 °F (37.1 °C)] 98.5 °F (36.9 °C)  Pulse:  [58-71] 68  Resp:  [16-19] 16  SpO2:  [97 %-100 %] 98 %  BP: (124-173)/(66-78) 173/70     Weight: 79.5 kg (175 lb 4.3 oz)  Body mass index is 28.29 kg/m².    Intake/Output Summary (Last 24 hours) at 3/7/2022 1146  Last data filed at 3/7/2022 0900  Gross per 24 hour   Intake --   Output 300 ml   Net -300 ml      Physical  Exam  Constitutional:       General: She is not in acute distress.     Appearance: Normal appearance. She is normal weight. She is not ill-appearing or toxic-appearing.   HENT:      Head: Normocephalic and atraumatic.      Mouth/Throat:      Mouth: Mucous membranes are moist.      Pharynx: Oropharynx is clear.   Eyes:      Extraocular Movements: Extraocular movements intact.      Pupils: Pupils are equal, round, and reactive to light.   Cardiovascular:      Rate and Rhythm: Normal rate and regular rhythm.      Pulses: Normal pulses.      Heart sounds: Normal heart sounds.   Pulmonary:      Effort: Pulmonary effort is normal.      Breath sounds: Normal breath sounds.   Abdominal:      General: Abdomen is flat. Bowel sounds are normal. There is no distension.      Palpations: Abdomen is soft.      Tenderness: There is abdominal tenderness in the right upper quadrant. There is no guarding or rebound.   Musculoskeletal:      Right lower leg: No edema.      Left lower leg: No edema.   Skin:     General: Skin is warm and dry.   Neurological:      Mental Status: She is alert and oriented to person, place, and time. Mental status is at baseline.   Psychiatric:         Mood and Affect: Mood normal.         Behavior: Behavior normal.         Thought Content: Thought content normal.         Judgment: Judgment normal.       Significant Labs: All pertinent labs within the past 24 hours have been reviewed.  BMP:   Recent Labs   Lab 03/07/22  0826   *      K 3.7      CO2 28   BUN 19   CREATININE 1.0   CALCIUM 8.6*     CBC:   Recent Labs   Lab 03/06/22  0734 03/07/22  0826   WBC 9.65 5.08   HGB 9.8* 9.4*   HCT 31.5* 30.3*    177     CMP:   Recent Labs   Lab 03/07/22  0826      K 3.7      CO2 28   *   BUN 19   CREATININE 1.0   CALCIUM 8.6*   PROT 6.2   ALBUMIN 2.9*   BILITOT 0.8   ALKPHOS 302*   AST 50*   ALT 72*   ANIONGAP 5*   EGFRNONAA 58*       Significant Imaging: I have reviewed all  pertinent imaging results/findings within the past 24 hours.      Assessment/Plan:      * Choledocholithiasis  Patient complaining of RUQ pain     CT noted worsening intra and extrahepatic bile duct dilatation in setting of normal LFTs with concern for choledocolithiais. Previous cholecystectomy, ERCP for choledocolithiasis 11/2021   Afebrile without leukocytosis and normal lipase    General surgery consulted, no surgical intervention. GI consulted, appreciate recommendations    - Per GI,  low threshold for AB coverage if febrile or clinical decompensation, clinically not consistent with cholangitis at this time  - MRCP c/w choledocholithiasis; ERCP with octreotide performed 3/4  - Surgery tentatively planning cytoreduction +/- choledochoduodenostomy, timing would be after completion of IV antibiotics  - antibiotic recommendations as below per Infectious Disease    Pyelonephritis  -see above    Dilation of biliary tract  See choledocholithiasis    Chronic diastolic heart failure with preserved ejection fraction  No acute symptoms indicative of HF exacerbation.  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 s/p TACE and open leslie in 2021 now with recurrent UTI and acute on chronic pain   No leukocytosis, elevated bilirubin or LFTs to suggest acute abdominal etiology      - Trend LFTs   - Octreotide coverage with ERCP    Fever  -following ERCP  -blood cultures pending, NGTD  -continue IV antibiotics  - Afebrile last 24 hours    Impaired functional mobility, balance, gait, and endurance  Patient reports ambulating at home with walker    - PT/OT consulted, recommend  OT and PT    Anemia of chronic disease  Chronic, stable    - Continue to monitor for intervention    Essential hypertension  Hypertensive in ED, improved with hydralazine 10mg IV      Chronic, controlled.  Latest blood pressure and vitals reviewed-   Temp:  [98.2 °F (36.8 °C)-98.8 °F (37.1 °C)]   Pulse:  [58-71]   Resp:  [16-19]   BP: (124-173)/(66-70)   SpO2:  [97 %-100 %] .   Home meds for hypertension were reviewed and noted below.   Hypertension Medications             losartan (COZAAR) 100 MG tablet Take 1 tablet (100 mg total) by mouth once daily.    metoprolol tartrate (LOPRESSOR) 25 MG tablet TAKE 1 TABLET TWICE DAILY        While in the hospital, will manage blood pressure as follows; Continue home antihypertensive regimen     - Continue Amlodipine 2.5 mg QD    Chronic pain syndrome  History of colon cancer, followed by oncologist. Reports talking oxycodone at home    - Continue home dose oxycodone 15 mg q4 hrs PRN    Urinary tract infection without hematuria  History of UTIs. Prior cultures positive for klebsiella as well as multidrug resistant bacteria.    - Urine culture grew hafnia alvei, hx of ESBL  - Deescalated to cefepime and metronidazole per Infectious Disease; will need 14 day course  - Will be discharged with course of Cipro and Flagyl     Nephrolithiasis  CT abdomen/pelvis showed right kidney with multifocal areas of cortical thinning likely related to parenchymal scarring, karge staghorn calculus noted within the inferior pole of the right renal collecting system, and additional bilateral nonobstructing renal stones with stone burden unchanged when compared to the previous CT without hydronephrosis or hydroureter    Patient reports regularly seeing her urologist with next appointment 3/15/22    - Will follow up with urologist as an outpatient for consideration of nephrotomy given recalcitrant UTIs    Neuroendocrine carcinoma, unknown primary site        VTE Risk Mitigation (From admission, onward)         Ordered     enoxaparin injection 40 mg  Daily         03/01/22 2321     IP VTE HIGH RISK PATIENT  Once         03/01/22 1635     Place sequential compression device  Until  discontinued         03/01/22 1635                Discharge Planning   JULIO C: 3/7/2022     Code Status: Prior   Is the patient medically ready for discharge?:     Reason for patient still in hospital (select all that apply): Patient trending condition                     Unique Ferguson PA-C  Department of Hospital Medicine   [facility]

## 2022-03-07 NOTE — ASSESSMENT & PLAN NOTE
History of UTIs. Prior cultures positive for klebsiella as well as multidrug resistant bacteria.    - Urine culture grew kelly freeman, hx of ESBL  - Deescalated to cefepime and metronidazole per Infectious Disease; will need 14 day course  - Will be discharged with course of Cipro and Flagyl

## 2022-03-08 ENCOUNTER — OUTPATIENT CASE MANAGEMENT (OUTPATIENT)
Dept: ADMINISTRATIVE | Facility: OTHER | Age: 72
End: 2022-03-08
Payer: MEDICARE

## 2022-03-08 ENCOUNTER — PATIENT OUTREACH (OUTPATIENT)
Dept: ADMINISTRATIVE | Facility: CLINIC | Age: 72
End: 2022-03-08
Payer: MEDICARE

## 2022-03-08 ENCOUNTER — PATIENT OUTREACH (OUTPATIENT)
Dept: ADMINISTRATIVE | Facility: OTHER | Age: 72
End: 2022-03-08
Payer: MEDICARE

## 2022-03-08 RX ORDER — MECLIZINE HYDROCHLORIDE 25 MG/1
TABLET ORAL
Qty: 30 TABLET | Refills: 0 | Status: SHIPPED | OUTPATIENT
Start: 2022-03-08 | End: 2022-06-24

## 2022-03-08 RX ORDER — ATORVASTATIN CALCIUM 40 MG/1
40 TABLET, FILM COATED ORAL NIGHTLY
Qty: 90 TABLET | Refills: 3 | Status: SHIPPED | OUTPATIENT
Start: 2022-03-08 | End: 2023-11-06

## 2022-03-08 NOTE — PROGRESS NOTES
C3 nurse spoke with Patito Domingo for a TCC post hospital discharge follow up call. The patient has a scheduled HOSFU appointment with Yasir Myers Jr, MD on 3/14/2022 @ 0900.  Message to PCP staff about atorvastatin 40 mg and meclizine 25 mg refill request.  Spoke to Bridgette with [facility].  Patient is on schedule for tomorrow.  Patient request for assistance to [facility] tomorrow sent OPCM.

## 2022-03-08 NOTE — PROGRESS NOTES
Head, normocephalic, atraumatic, Face, Face within normal limits, Ears, External ears within normal limits, Nose/Nasopharynx, External nose  normal appearance, nares patent, no nasal discharge, Mouth and Throat, Oral cavity appearance normal, Breath odor normal, Lips, Appearance normal [place] called and spoke with Teresa/[facility] /Sh[facility]un  - confirmed that referral was rec'd - pt will be seen tomorrow.   Resumption of care

## 2022-03-08 NOTE — PROGRESS NOTES
Patient AAOx4, VSS, AVS printed and given to patient. Medications delivered at bedside. PICC removed per MD order. preparing for d/c

## 2022-03-08 NOTE — PROGRESS NOTES
TN called and spoke with Christian at [facility] -   confirmed that pt will be seen tomorrow - resumption of care.

## 2022-03-08 NOTE — DISCHARGE SUMMARY
"[facility] Hospital Medicine  Discharge Summary      Patient Name: Patito Domingo  MRN: [id number]  Patient Class: IP- Inpatient  Admission Date: 3/1/2022  Hospital Length of Stay: 4 days  Discharge Date and Time: 3/7/2022  8:42 PM  Attending Physician: Linette att. providers found   Discharging Provider: Unique Ferguson PA-C  Primary Care Provider: Yasir Myers Jr, MD      HPI:   Patito Domingo is a 70 yo female with a pmh of cholecystectomy, colon cancer, liver tumor, CVA, HTN, frequent UTI. She presented with abdominal pain that started yesterday evening. She described right sided abdominal pain into the RUQ that is worse when she bends forward. She denies fevers but does report chills yesterday. She takes narcotics for chronic pain due to colon cancer but states this pain was different than normal. She reports having her gall bladder removed a few years ago. No change in appetite. She also reports vaginal irritation and dysuria which has been ongoing. She was admitted here earlier this month for a UTI, treated with merrem. She is followed by urology outpatient. She had a UA done on 2/23/22 due to urinary symptoms and found to be positive for UTI. She was prescribed macrobid but did not take it because 'it does not work". Today in the ED, she was found to have a UTI. CT abdomen with concern for choledocholithiasis with similar to slightly increased intra and extrahepatic bile duct dilatation. The patient was given a dose of macrobid in the ED and neuroendocrine was consulted per ED provider. She was also hypertensive on arrival and was given hydralazine IV. No leukocytosis or  elevated LFTs on arrival.       * No surgery found *      Hospital Course:   Patient is 71 year old female admitted for choledocholithiasis and UTI. Started on IV meropenem. ID consulted and recommended to transition to cefepime and flagyl. GI consulted, MRCP and ERCP completed. General surgery and neuroendocrine following, no " surgical intervention planned. Urine culture with >100,000 CFU of Hafnei Alvei that is pansensitive. Discharge home on 11 day course of Cipro and Flagyl. Hypertensive upon admission, home BP meds resumed with addition of amlodipine. Patient counseled on medication changes, and voiced understanding. Will follow up with PCP, neuroendocrinology, and urology as outpatient. Return precautions discussed, patient voiced understanding. All questions and concerns answered at this time.        Goals of Care Treatment Preferences:  Code Status: Full Code      Consults:   Consults (From admission, onward)        Status Ordering Provider     [facility]  Once        Provider:  (Not yet assigned)    Completed MARYCHUY BRITTON     Inpatient virtual consult to Hospital Medicine  Once        Provider:  (Not yet assigned)    Completed MARYCHUY BRITTON     Inpatient consult to Infectious Diseases  Once        Provider:  Beverley Vanessa MD    Completed FRANCHESKA RUTLEDGE     Inpatient consult to Gastroenterology-[facility]  Once        Provider:  Cristian Lomas MD    Completed CORDELIA PETTY          No new Assessment & Plan notes have been filed under this hospital service since the last note was generated.  Service: Hospital Medicine    Final Active Diagnoses:    Diagnosis Date Noted POA    PRINCIPAL PROBLEM:  Choledocholithiasis [K80.50] 03/01/2022 Yes    Pyelonephritis [N12]  Yes    Chronic diastolic heart failure with preserved ejection fraction [I50.32] 03/02/2022 Yes    Dilation of biliary tract [K83.8] 03/02/2022 Yes    Malignant carcinoid tumor of midgut [C7A.095]  Yes    Fever [R50.9] 10/05/2021 Yes    Impaired functional mobility, balance, gait, and endurance [Z74.09] 01/14/2020 Yes    Essential hypertension [I10] 11/19/2019 Yes     Chronic    Anemia of chronic disease [D63.8] 11/19/2019 Yes     Chronic    Chronic pain syndrome [G89.4] 12/02/2018 Yes    Urinary  tract infection without hematuria [N39.0] 04/28/2018 Yes    Nephrolithiasis [N20.0] 12/01/2014 Yes     Chronic      Problems Resolved During this Admission:       Discharged Condition: good    Disposition: Home or Self Care    Follow Up:   Follow-up Information     Yasir Myers Jr, MD Follow up on 3/14/2022.    Specialty: Family Medicine  Why: 9:00 am  Contact information:  [address]  [address]   [place] [address]  [phone]             Ervin Parikh MD Follow up on 3/15/2022.    Specialty: Urology  Why: 9:15 am  Contact information:  [address]  [address]  [place] [address]  [phone]             Chris Herbert MD Follow up on 3/28/2022.    Specialty: General Surgery  Why: 10:00 am  Contact information:  [address]  [address]  [place] [address]  [phone]             [facility] Follow up.    Contact information:  [address] [place] [address] [phone]                     Patient Instructions:      Diet Cardiac     Notify your health care provider if you experience any of the following:  temperature >100.4     Notify your health care provider if you experience any of the following:  severe uncontrolled pain     Notify your health care provider if you experience any of the following:  persistent dizziness, light-headedness, or visual disturbances     Notify your health care provider if you experience any of the following:  increased confusion or weakness     Activity as tolerated       Significant Diagnostic Studies: Labs:   BMP:   Recent Labs   Lab 03/07/22  0826   *      K 3.7      CO2 28   BUN 19   CREATININE 1.0   CALCIUM 8.6*   , CMP   Recent Labs   Lab 03/07/22  0826      K 3.7      CO2 28   *   BUN 19   CREATININE 1.0   CALCIUM 8.6*   PROT 6.2   ALBUMIN 2.9*   BILITOT 0.8   ALKPHOS 302*   AST 50*   ALT 72*   ANIONGAP 5*   ESTGFRAFRICA >60   EGFRNONAA 58*    and CBC   Recent Labs    Lab 03/07/22  0826   WBC 5.08   HGB 9.4*   HCT 30.3*          Pending Diagnostic Studies:     None         Medications:  Reconciled Home Medications:      Medication List      START taking these medications    amLODIPine 2.5 MG tablet  Commonly known as: NORVASC  Take 1 tablet (2.5 mg total) by mouth once daily.     ciprofloxacin HCl 750 MG tablet  Commonly known as: CIPRO  Take 1 tablet (750 mg total) by mouth every 12 (twelve) hours. for 11 days     metroNIDAZOLE 500 MG tablet  Commonly known as: FLAGYL  Take 1 tablet (500 mg total) by mouth every 8 (eight) hours. for 11 days        CHANGE how you take these medications    meclizine 25 mg tablet  Commonly known as: ANTIVERT  TAKE 1 TABLET(25 MG) BY MOUTH THREE TIMES DAILY AS NEEDED FOR DIZZINESS  What changed: See the new instructions.        CONTINUE taking these medications    alcohol swabs Padm  Commonly known as: BD ALCOHOL SWABS  Apply 1 each topically as needed.     atorvastatin 40 MG tablet  Commonly known as: LIPITOR  Take 1 tablet (40 mg total) by mouth every evening.     BLOOD PRESSURE CUFF Misc  Generic drug: miscellaneous medical supply  1 each by Misc.(Non-Drug; Combo Route) route once daily.     cyanocobalamin 1000 MCG tablet  Commonly known as: VITAMIN B-12  Take 1 tablet (1,000 mcg total) by mouth once daily.     losartan 100 MG tablet  Commonly known as: COZAAR  Take 1 tablet (100 mg total) by mouth once daily.     magnesium oxide 400 mg (241.3 mg magnesium) tablet  Commonly known as: MAG-OX  Take 1 tablet (400 mg total) by mouth 2 (two) times daily.     metoprolol tartrate 25 MG tablet  Commonly known as: LOPRESSOR  TAKE 1 TABLET TWICE DAILY     oxyCODONE 15 MG Tab  Commonly known as: ROXICODONE  Take 15 mg by mouth every 4 (four) hours as needed (chronic abdominal pain, malignancy related).            Indwelling Lines/Drains at time of discharge:   Lines/Drains/Airways     None                 Time spent on the discharge of patient: 40  minutes         Unique Ferguson PA-C  Department of [place] [employer] [facility] Medicine  [facility]

## 2022-03-08 NOTE — PROGRESS NOTES
Outpatient Care Management  Plan of Care Follow Up Visit    Patient: Patito Domingo  MRN: [id number]  Date of Service: 03/08/2022  Completed by: Marcie Thrasher RN  Referral Date: 02/10/2022  Program:     Reason for Visit   Patient presents with    OPCM Chart Review    Update Plan Of Care    Follow-up           Brief Summary:     Noted pt to ed on 3/1 acute cystitis.  Also noted cholelithiasis pt had ercp and stones were cleared during hospital stay.  Received message from [facility] about attendant to assist pt to appointment  Call pt again and explained that [facility] does not provide that service and I  will contact her about wavier application    Next steps from previous encounter  Best number to contact pt   [phone]  Best time 11 m  Did pt do what they agreed on at admit   antibiotics or the align probiotic  No she ended up back in the hospital  Follow up on receipt of education material sent in mail urinary tract infection discharge instructions not received  Fu on receipt of moms meals received and not interested in another shipment for this admit        Interventions  Chart reviewed  Reviewed upcoming appt schedule        Assess/review short term goals met       Patient agrees to follow up call in 1 week      Next steps  Fu on use of  the align probiotic  Fu on start of home health  Fu on antibiotic use  Fu on uti  and or yeast infection symptoms  Fu on bp trends  Fu on  referral  Follow up on receipt of education material sent in mail urinary tract infection discharge instructions        Patient Summary     Involvement of Care:  Do I have permission to speak with other family members about your care?       Patient Reported Labs & Vitals:  1.  Any Patient Reported Labs & Vitals?     2.  Patient Reported Blood Pressure:     3.  Patient Reported Pulse:     4.  Patient Reported Weight (Kg):     5.  Patient Reported Blood Glucose (mg/dl):       Medical and social history was  reviewed with patient and/or caregiver.     Clinical Assessment     Reviewed and provided basic information on available community resources for mental health, transportation, wellness resources, and palliative care programs with patient and/or caregiver.     Complex Care Plan     Care plan was discussed and completed today with input from patient and/or caregiver.    Patient Instructions     Instructions were provided via the Speak With Me patient resources and are available for the patient to view on the patient portal.      Todays OPCM Self-Management Care Plan was developed with the patients/caregivers input and was based on identified barriers from todays assessment.  Goals were written today with the patient/caregiver and the patient has agreed to work towards these goals to improve his/her overall well-being. Patient verbalized understanding of the care plan, goals, and all of today's instructions. Encouraged patient/caregiver to communicate with his/her physician and health care team about health conditions and the treatment plan.  Provided my contact information today and encouraged patient/caregiver to call me with any questions as needed.

## 2022-03-08 NOTE — TELEPHONE ENCOUNTER
----- Message from Micaela Starks RN sent at 3/8/2022 10:22 AM CST -----  Regarding: Medication request  Please forward this important TCC information to your provider in order to maximize the post discharge care delivery of this patient.    C3 nurse spoke with Patito Domingo for a TCC post hospital discharge follow up call. The patient has a scheduled HOSFU appointment with Yasir Myers Jr, MD on 3/14/2022 @ 0900.    Patient is asking about getting medication refills for atorvastatin 40 mg and meclizine 25 mg.  Please contact patient directly for these requests.    Respectfully,  Micaela Starks RN  [facility] C3    [email or web address]       Please do not reply to this message, as this inbox is not routinely monitored.

## 2022-03-08 NOTE — PHYSICIAN QUERY
PT Name: Patito Domingo  MR #: [id number]     Documentation Clarification      CDS Namrata Sheets RN, BSN        Contact Information:    [email or web address]           This form is a permanent document in the medical record.     Query Date: March 8, 2022    By submitting this query, we are merely seeking further clarification of documentation. Please utilize your independent clinical judgment when addressing the question(s) below.    The Medical Record reflects the following:    Supporting Clinical Findings Location in Medical Record   72 yo female with a pmh of cholecystectomy, colon cancer, liver tumor, CVA, HTN, frequent UTI. She presented with abdominal pain.  CT abdomen with concern for choledocholithiasis with similar to slightly increased intra and extrahepatic bile duct dilatation.    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     Tender mass R side abdomen corresponding to tumor seen on dotatate PET scan and non con CT          Impression:     1. CT findings concerning for choledocholithiasis with similar to slightly increased intra and extrahepatic bile duct dilatation.  Consider further characterization with MRCP.  2. Constellation of findings consistent with the patient's known metastatic carcinoid tumor including:  *Stable partially calcified mesenteric mass with surrounding lymphadenopathy.  *Multiple treated hepatic lesions.  3.  Bilateral nonobstructing renal stones with stone burden  similar when compared to the previous CT.     4.  Multifocal areas of right renal cortical thinning likely representing scars.     5.  Postoperative changes of right hemicolectomy for presumed distal small bowel tumor resection.     6.  Low-attenuation pancreatic parenchymal lesions, possibly small cysts or side branch IPMNs but incompletely characterized on this exam. 3/2 HM PN                      3/3 Neuroendocrine PN      3/1 CT                                                                            Provider, please further clarify  Neuroendocrine tumor with mets to the liver     [ x  ] Current condition, significant to this hospital encounter     [   ] History only, not a current condition on admit      [   ] Other (please specify): ____________     [  ] Clinically undetermined

## 2022-03-08 NOTE — PROGRESS NOTES
IP Liaison - Final Visit Note    Patient: Patito Domingo  MRN:  [id number]  Date of Service:  3/8/2022  Completed by:  MADHURI Matias    Reason for Visit   Patient presents with    IP Liaison Chart Review       Patient Summary     Discharge Date: 3/7/2022  Discharge telephone number/address: [phone] / [address] [place] [address]  Follow up provider: Yasir Myers Jr, MD / Ervin Parikh MD  Follow up appointments: 3/14/2022 @ 9:00am / 3/15/2022 @ 9:15am  Home Health agency & telephone number: [facility]  DME ordered &  name: n/a   Assigned OPCM RN/SW: n/a  Report sent to follow up team (PCP/OPCM) via in basket message: n/a  Community Resources arranged including agency name & contact info: n/a      MADHURI Matias

## 2022-03-09 ENCOUNTER — EXTERNAL HOME HEALTH (OUTPATIENT)
Dept: HOME HEALTH SERVICES | Facility: HOSPITAL | Age: 72
End: 2022-03-09
Payer: MEDICARE

## 2022-03-10 ENCOUNTER — OUTPATIENT CASE MANAGEMENT (OUTPATIENT)
Dept: ADMINISTRATIVE | Facility: OTHER | Age: 72
End: 2022-03-10
Payer: MEDICARE

## 2022-03-10 NOTE — PROGRESS NOTES
Outpatient Care Management   - High Risk Patient Assessment    Patient: Patito Domingo  MRN:  [id number]  Date of Service:  3/10/2022  Completed by:  Lakia Spear LCSW  Referral Date: 02/10/2022  Program:     Reason for Visit   Patient presents with    \[facility]\"" Chart Review    Plan Of Care    Social Work Assessment - High Risk       Brief Summary:  received a referral from \[facility]\"" RN Marcie HERNANDEZ for the following patient identified psycho-social needs of pt needing assistance with status of her waiver program application.   \[facility]\""  completed high risk assessment with pt who reports that she lives alone.  Has 4 children but they all work.  Pt is receiving home health services.  Uses [employer] for transportation.  Pt is aware of her [employer] benefits as far as transpo, healthy food card and OTC benefit.  Pt states she uses to live with one of her son's but likes living alone.  Does not want nursing home placement.  Pt states she is independent with ADL's, cooks but does not drive.  Pt states she only needs assistance when she goes to [facility] or when she has procedures and would like someone to be with her.  \[facility]\""  and pt contacted the waiver program.  It was reported that pt applied for the community choice waiver in May 2021 and the state is currently working on the waiting list from 2011.  Pt verbalized frustration at this long waiting list. Pt verbalized no financial concerns.  No other social work needs identified at this time. .    Patient Summary     \[facility]\"" Social Work Assessment (High Risk)    Involvement of Care  Do I have permission to speak with other family members about your care?: Yes (Comment: Matias--pt's son)  Assessment completed by: Patient  Cognitive  Which of the following can you state?: Name, Address  Cognitive barriers?: No  General  Marital status:   Current employment status: Retired and not working  Support  Level of Caregiver  "support: Member independent and does not need caregiver assistance  Support system: Children, Home care staff  Is the caregiver reporting burnout?: No  Support Barriers?: Yes (Comment: no one to help her at home)  Advanced Care Planning  Do you have any of the following?: None  If yes, do we have a copy?: N/A  If no, would you like Advance Directive resources?: No  Advance Care Planning resources provided?: No  Is Advance Care Planning an area of need?: Yes  Financial  Current medical coverage: Medicare Advantage, Medicaid  Have you applied for government assistance programs?: Yes (Comment: 130 SNAP)  Are you unable to pay any of the following?: None  Gross monthly income: 1036  Financial Support Barriers?: No  Safety  Safety barriers?: No  Special safety issues: Emergency alert system   History  Do you or your spouse currently or formerly serve in the ?: No  Disaster Plan  Established evacuation plan?: Yes (Comment: would leave with her son)  [place] residence: [facility]  Evacuation location: could go to [place] to stay with her son  Ability to evacuate: N/A  Mental Health Status  Emotional status: Angry (Comment: "I am at an age where I need help and I cannot get anyone to help me")  Have you recenetly lost a loved one?: Yes (Comment: pt's 3 sisters and 1 brother  this past year)  Psychiatric diagnosis: none  Current mental health treatment: No  Would you like mental health resources?: No  Current symptoms: Sleep disturbances (Comment: secondary to pain)  Mental/Behavioral/Environmental risk: None  Mental Health Barriers?: No  Addictive Behaviors  Current alcohol consumption?: No  Current substance abuse?: No  Gambling habits?: No  Was the PHQ depression screening completed?: No  Was the MARGARITA-7 completed?: No         Complex Care Plan     Care plan was discussed and completed today with input from patient and/or caregiver.    Patient Instructions     Follow up in about 1 week (around " 3/17/2022).    Todays OPCM Self-Management Care Plan was developed with the patients/caregivers input and was based on identified barriers from todays assessment.  Goals were written today with the patient/caregiver and the patient has agreed to work towards these goals to improve his/her overall well-being. Patient verbalized understanding of the care plan, goals, and all of today's instructions. Encouraged patient/caregiver to communicate with his/her physician and health care team about health conditions and the treatment plan.  Provided my contact information today and encouraged patient/caregiver to call me with any questions as needed.

## 2022-03-11 ENCOUNTER — TELEPHONE (OUTPATIENT)
Dept: ENDOSCOPY | Facility: HOSPITAL | Age: 72
End: 2022-03-11

## 2022-03-11 ENCOUNTER — TELEPHONE (OUTPATIENT)
Dept: FAMILY MEDICINE | Facility: CLINIC | Age: 72
End: 2022-03-11
Payer: MEDICARE

## 2022-03-11 LAB
BACTERIA BLD CULT: NORMAL
BACTERIA BLD CULT: NORMAL

## 2022-03-11 NOTE — TELEPHONE ENCOUNTER
----- Message from Genevieve Mar sent at 3/11/2022  1:57 PM CST -----  Needs advice from nurse:      Who Called:pt  Regarding:needs refill on diarrhea medicine, patient couldn't remember the name/needs it for tomorrow  Would the patient rather a call back or VIA [facility]?  Best Call Back number:[phone]  Additional Info:[id number] [place] [address] AT [facility] [address] (Ph: [phone])

## 2022-03-11 NOTE — TELEPHONE ENCOUNTER
----- Message from Julian Robles sent at 3/11/2022  4:03 PM CST -----  Regarding: pt advice  Contact: self  Pt stated that she is experiencing diarrhea and cramping pt can be reached at [phone]        ERCP done on 03/04

## 2022-03-11 NOTE — TELEPHONE ENCOUNTER
She says she is having abd pain.in her mid abdomen in addition to Diarrhea and cramping..  She tried to contact PCP, but he was out. She is going to go to urgent care or ER

## 2022-03-11 NOTE — TELEPHONE ENCOUNTER
----- Message from Yris Martins sent at 3/11/2022  3:32 PM CST -----  Contact: [phone]/ self  Who Called: pt   Regarding: pt has a stomach bug and she keeps running to the bathroom   Would the patient rather a call back or a response via [facility]? Call back  Best Call Back Number: [phone]  Additional Information:

## 2022-03-11 NOTE — TELEPHONE ENCOUNTER
Spoke with pt, pt stated she just had a surgery the other day and is now having complications. I stated to pt she needs to go to the ER and be seen. Pt verbalized understanding.

## 2022-03-14 ENCOUNTER — TELEPHONE (OUTPATIENT)
Dept: GASTROENTEROLOGY | Facility: CLINIC | Age: 72
End: 2022-03-14
Payer: MEDICARE

## 2022-03-14 ENCOUNTER — HOSPITAL ENCOUNTER (EMERGENCY)
Facility: HOSPITAL | Age: 72
Discharge: HOME | End: 2022-03-14
Attending: EMERGENCY MEDICINE
Payer: MEDICARE

## 2022-03-14 ENCOUNTER — NURSE TRIAGE (OUTPATIENT)
Dept: ADMINISTRATIVE | Facility: CLINIC | Age: 72
End: 2022-03-14
Payer: MEDICARE

## 2022-03-14 ENCOUNTER — TELEPHONE (OUTPATIENT)
Dept: UROLOGY | Facility: CLINIC | Age: 72
End: 2022-03-14
Payer: MEDICARE

## 2022-03-14 VITALS
RESPIRATION RATE: 16 BRPM | TEMPERATURE: 99 F | HEART RATE: 79 BPM | SYSTOLIC BLOOD PRESSURE: 168 MMHG | OXYGEN SATURATION: 100 % | DIASTOLIC BLOOD PRESSURE: 74 MMHG

## 2022-03-14 DIAGNOSIS — R19.7 DIARRHEA, UNSPECIFIED TYPE: ICD-10-CM

## 2022-03-14 DIAGNOSIS — R10.11 RUQ PAIN: ICD-10-CM

## 2022-03-14 DIAGNOSIS — N30.01 ACUTE CYSTITIS WITH HEMATURIA: Primary | ICD-10-CM

## 2022-03-14 LAB
ALBUMIN SERPL BCP-MCNC: 3.4 G/DL (ref 3.5–5.2)
ALP SERPL-CCNC: 174 U/L (ref 55–135)
ALT SERPL W/O P-5'-P-CCNC: 17 U/L (ref 10–44)
ANION GAP SERPL CALC-SCNC: 9 MMOL/L (ref 8–16)
AST SERPL-CCNC: 19 U/L (ref 10–40)
BACTERIA #/AREA URNS HPF: ABNORMAL /HPF
BASOPHILS # BLD AUTO: 0.02 K/UL (ref 0–0.2)
BASOPHILS NFR BLD: 0.5 % (ref 0–1.9)
BILIRUB SERPL-MCNC: 0.5 MG/DL (ref 0.1–1)
BILIRUB UR QL STRIP: NEGATIVE
BUN SERPL-MCNC: 10 MG/DL (ref 8–23)
CALCIUM SERPL-MCNC: 9.1 MG/DL (ref 8.7–10.5)
CHLORIDE SERPL-SCNC: 107 MMOL/L (ref 95–110)
CLARITY UR: ABNORMAL
CO2 SERPL-SCNC: 25 MMOL/L (ref 23–29)
COLOR UR: YELLOW
CREAT SERPL-MCNC: 0.8 MG/DL (ref 0.5–1.4)
DIFFERENTIAL METHOD: ABNORMAL
EOSINOPHIL # BLD AUTO: 0.1 K/UL (ref 0–0.5)
EOSINOPHIL NFR BLD: 1.2 % (ref 0–8)
ERYTHROCYTE [DISTWIDTH] IN BLOOD BY AUTOMATED COUNT: 13.7 % (ref 11.5–14.5)
EST. GFR  (AFRICAN AMERICAN): >60 ML/MIN/1.73 M^2
EST. GFR  (NON AFRICAN AMERICAN): >60 ML/MIN/1.73 M^2
GLUCOSE SERPL-MCNC: 101 MG/DL (ref 70–110)
GLUCOSE UR QL STRIP: NEGATIVE
HCT VFR BLD AUTO: 29.3 % (ref 37–48.5)
HGB BLD-MCNC: 9.1 G/DL (ref 12–16)
HGB UR QL STRIP: ABNORMAL
IMM GRANULOCYTES # BLD AUTO: 0.01 K/UL (ref 0–0.04)
IMM GRANULOCYTES NFR BLD AUTO: 0.2 % (ref 0–0.5)
KETONES UR QL STRIP: NEGATIVE
LEUKOCYTE ESTERASE UR QL STRIP: ABNORMAL
LIPASE SERPL-CCNC: 26 U/L (ref 4–60)
LYMPHOCYTES # BLD AUTO: 1.1 K/UL (ref 1–4.8)
LYMPHOCYTES NFR BLD: 27.1 % (ref 18–48)
MCH RBC QN AUTO: 27.1 PG (ref 27–31)
MCHC RBC AUTO-ENTMCNC: 31.1 G/DL (ref 32–36)
MCV RBC AUTO: 87 FL (ref 82–98)
MICROSCOPIC COMMENT: ABNORMAL
MONOCYTES # BLD AUTO: 0.5 K/UL (ref 0.3–1)
MONOCYTES NFR BLD: 11.7 % (ref 4–15)
NEUTROPHILS # BLD AUTO: 2.4 K/UL (ref 1.8–7.7)
NEUTROPHILS NFR BLD: 59.3 % (ref 38–73)
NITRITE UR QL STRIP: NEGATIVE
NON-SQ EPI CELLS #/AREA URNS HPF: 6 /HPF
NRBC BLD-RTO: 0 /100 WBC
PH UR STRIP: 6 [PH] (ref 5–8)
PLATELET # BLD AUTO: 261 K/UL (ref 150–450)
PMV BLD AUTO: 10.4 FL (ref 9.2–12.9)
POTASSIUM SERPL-SCNC: 3.4 MMOL/L (ref 3.5–5.1)
PROT SERPL-MCNC: 6.6 G/DL (ref 6–8.4)
PROT UR QL STRIP: ABNORMAL
RBC # BLD AUTO: 3.36 M/UL (ref 4–5.4)
RBC #/AREA URNS HPF: 19 /HPF (ref 0–4)
SODIUM SERPL-SCNC: 141 MMOL/L (ref 136–145)
SP GR UR STRIP: 1.01 (ref 1–1.03)
SQUAMOUS #/AREA URNS HPF: 5 /HPF
URN SPEC COLLECT METH UR: ABNORMAL
UROBILINOGEN UR STRIP-ACNC: NEGATIVE EU/DL
WBC # BLD AUTO: 4.02 K/UL (ref 3.9–12.7)
WBC #/AREA URNS HPF: 52 /HPF (ref 0–5)

## 2022-03-14 PROCEDURE — 63600175 PHARM REV CODE 636 W HCPCS: Performed by: EMERGENCY MEDICINE

## 2022-03-14 PROCEDURE — 83690 ASSAY OF LIPASE: CPT | Performed by: EMERGENCY MEDICINE

## 2022-03-14 PROCEDURE — 25000003 PHARM REV CODE 250: Performed by: EMERGENCY MEDICINE

## 2022-03-14 PROCEDURE — 87086 URINE CULTURE/COLONY COUNT: CPT | Performed by: EMERGENCY MEDICINE

## 2022-03-14 PROCEDURE — 80053 COMPREHEN METABOLIC PANEL: CPT | Performed by: EMERGENCY MEDICINE

## 2022-03-14 PROCEDURE — 85025 COMPLETE CBC W/AUTO DIFF WBC: CPT | Performed by: EMERGENCY MEDICINE

## 2022-03-14 PROCEDURE — 81000 URINALYSIS NONAUTO W/SCOPE: CPT | Performed by: EMERGENCY MEDICINE

## 2022-03-14 PROCEDURE — 96374 THER/PROPH/DIAG INJ IV PUSH: CPT

## 2022-03-14 PROCEDURE — 99284 EMERGENCY DEPT VISIT MOD MDM: CPT | Mod: 25

## 2022-03-14 RX ORDER — OXYCODONE HYDROCHLORIDE 5 MG/1
15 TABLET ORAL
Status: COMPLETED | OUTPATIENT
Start: 2022-03-14 | End: 2022-03-14

## 2022-03-14 RX ORDER — PROCHLORPERAZINE EDISYLATE 5 MG/ML
10 INJECTION INTRAMUSCULAR; INTRAVENOUS
Status: COMPLETED | OUTPATIENT
Start: 2022-03-14 | End: 2022-03-14

## 2022-03-14 RX ADMIN — OXYCODONE 15 MG: 5 TABLET ORAL at 03:03

## 2022-03-14 RX ADMIN — PROCHLORPERAZINE EDISYLATE 10 MG: 5 INJECTION INTRAMUSCULAR; INTRAVENOUS at 02:03

## 2022-03-14 NOTE — TELEPHONE ENCOUNTER
Spoke with patient.  She has been experiencing abdominal pains and diarrhea since Friday last week.  Symptoms worsening, she lives alone in fear of passing out.  She stated she was told to go to ED for further evaluation.  Advised to adhere previous instructions.  She also stated she has a urinary tract infection (treated in ED previously)  Informed MD will evaluate and assess.  She voiced understanding.

## 2022-03-14 NOTE — TELEPHONE ENCOUNTER
Pt called in due to abdominal pain. Pt reports that she has been having diarrhea since Friday.States that she also has a UTI. Pt states that she called Dr. Hernandez to get an appointment but no appointments are available. Advised pt to be seen in ED now. Pt verbalized understanding    Reason for Disposition   SEVERE abdominal pain (e.g., excruciating)    Additional Information   Negative: Passed out (i.e., fainted, collapsed and was not responding)   Negative: Shock suspected (e.g., cold/pale/clammy skin, too weak to stand, low BP, rapid pulse)   Negative: Sounds like a life-threatening emergency to the triager    Protocols used: ABDOMINAL PAIN - FEMALE-A-OH

## 2022-03-14 NOTE — ED PROVIDER NOTES
Chief Complaint: diarrhea, RUQ pain    History of Present Illness:    Patito Domingo 71 y.o. with a  has a past medical history of Allergy, Arthritis, Cataract, Colon cancer, Encounter for blood transfusion, HTN (hypertension), Kidney stones (2014), Left pontine CVA (7/3/2021), 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, Pyelonephritis, acute, and 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). who presents to the emergency department today with a complaint of diarrhea since she started taking her ABX for a UTI and RUQ pain that has been present since she had a stone in the GB duct. She just started taking her flagyl and cipro that she was prescribed 3/7 for UTI and GI issues. Once she started taking it she started to have diarrhea. She had diarrhea all last night. Watery. No melena, no hematechezia. No vomiting. No fevers. She is having RUQ pain since the beginning of March. Was told she had a stone in her GB duct. It is worse after she eats.     ROS    Constitutional: No fever, no chills.  Eyes: No discharge. No pain.  HENT: No nasal drainage. No ear ache. No sore throat.  Cardiovascular: No chest pain, no palpitations.  Respiratory: No cough, no shortness of breath.  Gastrointestinal: See above.   Genitourinary: No hematuria, dysuria, urgency.  Musculoskeletal: No back pain.   Skin: No rashes, no lesions.  Neurological: No headache, no focal weakness.    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 and   has a past surgical history that includes Liver biopsy (9/14); Lithotripsy;  cystoscope; Hysterectomy (1996); Colon surgery; Eye surgery; Cataract extraction (Left, 10/2017); Cholecystectomy; Cystoscopy w/ retrogrades (Right, 10/10/2019); Ureteroscopy (Right, 10/10/2019);  section; Uterine fibroid surgery; Abdominal surgery; ERCP (N/A, 2021); and ERCP (N/A, 3/4/2022). with  reports that she has never smoked. She has never used smokeless tobacco. She reports that she does not drink alcohol and does not use drugs. and a family history includes Alzheimer's disease in her father; Cancer in her mother; No Known Problems in her son, son, son, and son; Stroke in her sister.      Nursing/Ancillary staff note reviewed.  VS reviewed         Physical Exam     BP (!) 201/78   Pulse 77   Temp 98.8 °F (37.1 °C) (Oral)   Resp 19   LMP  (LMP Unknown)   SpO2 99%     Physical Exam    General Appearance: The patient is alert, has no immediate need for airway protection and no signs of toxicity. No acute distress. Lying in bed but able to sit up without difficulty.   HEENT: Eyes: Pupils equal and round no pallor or injection. Extra ocular movements intact. No drainage.       Mouth: Mucous membranes are moist. Oropharynx clear.   Neck:Neck is supple non-tender. No lymphadenopathy. No stridor.   Respiratory: There are no retractions, lungs are clear to auscultation. No wheezing, no crackles. Chest wall nontender to palpation.   Cardiovascular: Regular rate and rhythm. No murmurs, rubs or gallops.  Gastrointestinal:  Abdomen is soft and non-tender to palpation in all quadrants, no masses, bowel sounds normal. No guarding, no rebound.  No pulsatile mass.   Neurological: Alert and oriented x 4. CN II-XII grossly intact. No focal weakness. Strength intact 5/5 bilaterally in upper and lower extremities.   Skin: Warm and dry, no rashes.  Musculoskeletal: Extremities are non-tender, non-swollen and have full range of motion. Back NTTP along the midline.     DIFFERENTIAL DIAGNOSIS: After history and  physical exam a differential diagnosis was considered, but was not limited to, colitis, C Diff, enteritis, gastroenteritis, gastritis, ulcer, cholecystitis, gallstones, pancreatitis, ileus, small bowel obstruction, appendicitis, constipation.             I independently interpreted the lab results and it showed the following: UA with UTI but she just started her ABXm have reiterated continue to take her ABX,T bili appropriate. Anemia but baseline. Lipase normal.     Pt unable to provide stool for c diff study.         ED Course     Medications   prochlorperazine injection Soln 10 mg (10 mg Intravenous Given 3/14/22 1014)   oxyCODONE immediate release tablet 15 mg (15 mg Oral Given 3/14/22 1539)                     Medical Decision Making:   History:   I obtained history from:  The patient  Old Medical Records: I decided to obtain old medical records.   Reviewed and summarized the old medical record and it showed pt admitted for her RUQ pain, seen by GI consulted, MRCP and ERCP completed. General surgery and neuroendocrine following, no surgical intervention planned at that time - she was placed on ABX for her UTI. She was seen again 3/11 for right sided pain, cleared by GI perspective, discharge home, encouraged to continue current pain meds.       Initial management:  Patito Domingo 71 y.o. female who presents to the ED today for diarrhea, RUQ pain.  Will obtain labs to ensure she is not dehydrated, FLORECITA, obtain stool for C diff given her recent ABX, monitor and reassess.   The patient was seen and examined, see chart. The history and physical exam was obtained, see chart. The nursing notes and vital signs were reviewed, see chart.        Clinical Tests:   I have ordered and independently interpreted Lab Tests: Ordered and Reviewed        ED Management:  Patito Domingo  presents to the emergency Department today with diarrhea and RUQ pain. Her pain is resolved after her home dose of oxycodone. She has had  diarrhea and has been on recent ABX so c diff is on my differential however pt was unable to provided stool studies. She desires to go home as her pain is resolved. I have discussed with her that should her diarrhea continue she should followup with her PCP for stool studies, she understands. She is to continue her ABX as she has signs of UTI, previous CX pan sensitive, she understands the need to take her ABX and will continue. I discussed with her warning signs for return.  The pt is comfortable with this plan and comfortable going home at this time. After taking into careful account the historical factors and physical exam findings of the patient's presentation today, in conjunction with the empirical and objective data obtained on ED workup, no acute emergent medical condition requiring admission has been identified. The patient appears to be low risk for an emergent medical condition and I feel it is safe and appropriate at this time for the patient to be discharged to follow-up as detailed in their discharge instructions for reevaluation and possible continued outpatient workup and management. Regardless, an unremarkable evaluation in the ED does not preclude the development or presence of a serious or life threatening condition. As such, patient was instructed to return immediately for any worsening or change in current symptoms. Precautions for return discussed at length.  Discharge and follow-up instructions discussed with the patient who expressed understanding and willingness to comply with my recommendations.    Voice recognition software utilized in this note.              Impression      The primary encounter diagnosis was Acute cystitis with hematuria. Diagnoses of RUQ pain and Diarrhea, unspecified type were also pertinent to this visit.                  Follow-up Information     Schedule an appointment as soon as possible for a visit  with Yaisr Myers Jr, MD.    Specialty: Family  Medicine  Contact information:  [facility] [facility] [address]  [facility] [address]  [phone]                                        Jerry Warren MD  03/14/22 8104

## 2022-03-14 NOTE — PHARMACY MED REC
"Admission Medication History     The home medication history was taken by Elsa Metzger.    You may go to "Admission" then "Reconcile Home Medications" tabs to review and/or act upon these items.      The home medication list has been updated by the Pharmacy department.    Please read ALL comments highlighted in yellow.    Please address this information as you see fit.     Feel free to contact us if you have any questions or require assistance.        Medications listed below were obtained from: Patient/family  (Not in a hospital admission)    No current facility-administered medications on file prior to encounter.     Current Outpatient Medications on File Prior to Encounter   Medication Sig Dispense Refill    amLODIPine (NORVASC) 2.5 MG tablet Take 1 tablet (2.5 mg total) by mouth once daily. 30 tablet 11    atorvastatin (LIPITOR) 40 MG tablet Take 1 tablet (40 mg total) by mouth every evening. 90 tablet 3    ciprofloxacin HCl (CIPRO) 750 MG tablet Take 1 tablet (750 mg total) by mouth every 12 (twelve) hours. for 11 days 22 tablet 0    cyanocobalamin (VITAMIN B-12) 1000 MCG tablet Take 1 tablet (1,000 mcg total) by mouth once daily. 30 tablet 5    losartan (COZAAR) 100 MG tablet Take 1 tablet (100 mg total) by mouth once daily. 90 tablet 0    magnesium oxide (MAG-OX) 400 mg (241.3 mg magnesium) tablet Take 1 tablet (400 mg total) by mouth 2 (two) times daily. 60 tablet 1    meclizine (ANTIVERT) 25 mg tablet TAKE 1 TABLET(25 MG) BY MOUTH THREE TIMES DAILY AS NEEDED FOR DIZZINESS (Patient taking differently: Take 25 mg by mouth 3 (three) times daily as needed for Dizziness.) 30 tablet 0    metoprolol tartrate (LOPRESSOR) 25 MG tablet TAKE 1 TABLET TWICE DAILY (Patient taking differently: Take 25 mg by mouth 2 (two) times daily.) 180 tablet 0    metroNIDAZOLE (FLAGYL) 500 MG tablet Take 1 tablet (500 mg total) by mouth every 8 (eight) hours. for 11 days 33 tablet 0    oxyCODONE (ROXICODONE) 15 MG " Tab Take 15 mg by mouth every 4 (four) hours as needed (chronic abdominal pain, malignancy related).      alcohol swabs (BD ALCOHOL SWABS) PadM Apply 1 each topically as needed. 90 each 0    BLOOD PRESSURE CUFF Misc 1 each by Misc.(Non-Drug; Combo Route) route once daily. 1 each 0       Elsa Metzger    [phone]                  .

## 2022-03-14 NOTE — TELEPHONE ENCOUNTER
Patient has c/o diarrhea and abd pain. Patient stated that she has been having accidents and needs to go to the ER. I informed patient that we do not have any appointments until April. Patient stated that she went to [place] and was turned away and said that she needed to go to [place]. Patient will proceed to the ED.

## 2022-03-14 NOTE — TELEPHONE ENCOUNTER
----- Message from Minda Turpin sent at 3/14/2022 11:00 AM CDT -----  Type:  Needs Medical Advice    Who Called:  pt  Symptoms (please be specific):  would like to  get a call back     Would the patient rather a call back or a response via [employer] [facility]?  Call   Best Call Back Number:  [phone]  Additional Information:

## 2022-03-14 NOTE — DISCHARGE INSTRUCTIONS
Continue to take your antibiotics as prescribed. You need them to clear your UTI. Follow up with your PCP. If you continue to have diarrhea you will need stool studies for C diff performed. Since you were unable to provide stool in the Emergency Department if your diarrhea continues your PCP can help you with this test. Take all your medications as prescribed. Return to the emergency department if you have increasing pain, chest pain, difficulty breathing,  nonstop vomiting, or any other concerns. Be sure to drink plenty of fluids to stay hydrated. Get plenty of rest. Please refer to additional educational material for further instructions.

## 2022-03-14 NOTE — TELEPHONE ENCOUNTER
----- Message from Minda Turpin sent at 3/14/2022 11:09 AM CDT -----  Type:  Needs Medical Advice    Who Called: pt  Symptoms (please be specific):  would like to get a call back   For issues with Diarrhea     How long has patient had these symptoms:   since last week     Pharmacy name and phone #:   Would the patient rather a call back or a response via [facility]?  All   Best Call Back Number: [phone]  Additional Information:

## 2022-03-15 NOTE — ED NOTES
Pt aa&ox4, notified pt of eta for transportation of 0130, stated she will attempt to find her own ride.  Requested cab number in case unable to find ride.  Will continue to monitor.

## 2022-03-15 NOTE — ED NOTES
Pt notified staff that son is on her way to pick pt up.  Pt dressed and prepared for d/c.  Will discontinue transportation request.

## 2022-03-16 ENCOUNTER — PATIENT OUTREACH (OUTPATIENT)
Dept: ADMINISTRATIVE | Facility: OTHER | Age: 72
End: 2022-03-16
Payer: MEDICARE

## 2022-03-16 LAB
BACTERIA UR CULT: NORMAL
BACTERIA UR CULT: NORMAL

## 2022-03-17 ENCOUNTER — OFFICE VISIT (OUTPATIENT)
Dept: FAMILY MEDICINE | Facility: CLINIC | Age: 72
End: 2022-03-17
Payer: MEDICARE

## 2022-03-17 ENCOUNTER — OUTPATIENT CASE MANAGEMENT (OUTPATIENT)
Dept: ADMINISTRATIVE | Facility: OTHER | Age: 72
End: 2022-03-17
Payer: MEDICARE

## 2022-03-17 VITALS
WEIGHT: 177.31 LBS | HEIGHT: 66 IN | SYSTOLIC BLOOD PRESSURE: 122 MMHG | DIASTOLIC BLOOD PRESSURE: 82 MMHG | BODY MASS INDEX: 28.5 KG/M2

## 2022-03-17 DIAGNOSIS — I10 ESSENTIAL HYPERTENSION: ICD-10-CM

## 2022-03-17 DIAGNOSIS — F39 MOOD DISORDER: ICD-10-CM

## 2022-03-17 DIAGNOSIS — N39.0 RECURRENT UTI: ICD-10-CM

## 2022-03-17 DIAGNOSIS — D63.8 ANEMIA OF CHRONIC DISEASE: ICD-10-CM

## 2022-03-17 DIAGNOSIS — N18.4 CHRONIC KIDNEY DISEASE, STAGE 4 (SEVERE): Primary | ICD-10-CM

## 2022-03-17 DIAGNOSIS — C7B.00 METASTATIC CARCINOID TUMOR: ICD-10-CM

## 2022-03-17 DIAGNOSIS — N20.0 STAGHORN CALCULUS: ICD-10-CM

## 2022-03-17 PROBLEM — S06.5XAA SUBDURAL HEMATOMA, POST-TRAUMATIC: Status: RESOLVED | Noted: 2021-03-17 | Resolved: 2022-03-17

## 2022-03-17 PROCEDURE — 1101F PR PT FALLS ASSESS DOC 0-1 FALLS W/OUT INJ PAST YR: ICD-10-PCS | Mod: CPTII,S$GLB,, | Performed by: FAMILY MEDICINE

## 2022-03-17 PROCEDURE — 99999 PR PBB SHADOW E&M-EST. PATIENT-LVL III: ICD-10-PCS | Mod: PBBFAC,,, | Performed by: FAMILY MEDICINE

## 2022-03-17 PROCEDURE — 3074F PR MOST RECENT SYSTOLIC BLOOD PRESSURE < 130 MM HG: ICD-10-PCS | Mod: CPTII,S$GLB,, | Performed by: FAMILY MEDICINE

## 2022-03-17 PROCEDURE — 99499 UNLISTED E&M SERVICE: CPT | Mod: HCNC,S$GLB,, | Performed by: FAMILY MEDICINE

## 2022-03-17 PROCEDURE — 1101F PT FALLS ASSESS-DOCD LE1/YR: CPT | Mod: CPTII,S$GLB,, | Performed by: FAMILY MEDICINE

## 2022-03-17 PROCEDURE — 4010F PR ACE/ARB THEARPY RXD/TAKEN: ICD-10-PCS | Mod: CPTII,S$GLB,, | Performed by: FAMILY MEDICINE

## 2022-03-17 PROCEDURE — 1111F DSCHRG MED/CURRENT MED MERGE: CPT | Mod: CPTII,S$GLB,, | Performed by: FAMILY MEDICINE

## 2022-03-17 PROCEDURE — 99499 RISK ADDL DX/OHS AUDIT: ICD-10-PCS | Mod: HCNC,S$GLB,, | Performed by: FAMILY MEDICINE

## 2022-03-17 PROCEDURE — 1125F PR PAIN SEVERITY QUANTIFIED, PAIN PRESENT: ICD-10-PCS | Mod: CPTII,S$GLB,, | Performed by: FAMILY MEDICINE

## 2022-03-17 PROCEDURE — 3079F PR MOST RECENT DIASTOLIC BLOOD PRESSURE 80-89 MM HG: ICD-10-PCS | Mod: CPTII,S$GLB,, | Performed by: FAMILY MEDICINE

## 2022-03-17 PROCEDURE — 3288F PR FALLS RISK ASSESSMENT DOCUMENTED: ICD-10-PCS | Mod: CPTII,S$GLB,, | Performed by: FAMILY MEDICINE

## 2022-03-17 PROCEDURE — 1159F MED LIST DOCD IN RCRD: CPT | Mod: CPTII,S$GLB,, | Performed by: FAMILY MEDICINE

## 2022-03-17 PROCEDURE — 4010F ACE/ARB THERAPY RXD/TAKEN: CPT | Mod: CPTII,S$GLB,, | Performed by: FAMILY MEDICINE

## 2022-03-17 PROCEDURE — 1125F AMNT PAIN NOTED PAIN PRSNT: CPT | Mod: CPTII,S$GLB,, | Performed by: FAMILY MEDICINE

## 2022-03-17 PROCEDURE — 3008F BODY MASS INDEX DOCD: CPT | Mod: CPTII,S$GLB,, | Performed by: FAMILY MEDICINE

## 2022-03-17 PROCEDURE — 3074F SYST BP LT 130 MM HG: CPT | Mod: CPTII,S$GLB,, | Performed by: FAMILY MEDICINE

## 2022-03-17 PROCEDURE — 3008F PR BODY MASS INDEX (BMI) DOCUMENTED: ICD-10-PCS | Mod: CPTII,S$GLB,, | Performed by: FAMILY MEDICINE

## 2022-03-17 PROCEDURE — 99214 PR OFFICE/OUTPT VISIT, EST, LEVL IV, 30-39 MIN: ICD-10-PCS | Mod: S$GLB,,, | Performed by: FAMILY MEDICINE

## 2022-03-17 PROCEDURE — 3079F DIAST BP 80-89 MM HG: CPT | Mod: CPTII,S$GLB,, | Performed by: FAMILY MEDICINE

## 2022-03-17 PROCEDURE — 99999 PR PBB SHADOW E&M-EST. PATIENT-LVL III: CPT | Mod: PBBFAC,,, | Performed by: FAMILY MEDICINE

## 2022-03-17 PROCEDURE — 1159F PR MEDICATION LIST DOCUMENTED IN MEDICAL RECORD: ICD-10-PCS | Mod: CPTII,S$GLB,, | Performed by: FAMILY MEDICINE

## 2022-03-17 PROCEDURE — 3288F FALL RISK ASSESSMENT DOCD: CPT | Mod: CPTII,S$GLB,, | Performed by: FAMILY MEDICINE

## 2022-03-17 PROCEDURE — 99214 OFFICE O/P EST MOD 30 MIN: CPT | Mod: S$GLB,,, | Performed by: FAMILY MEDICINE

## 2022-03-17 PROCEDURE — 1111F PR DISCHARGE MEDS RECONCILED W/ CURRENT OUTPATIENT MED LIST: ICD-10-PCS | Mod: CPTII,S$GLB,, | Performed by: FAMILY MEDICINE

## 2022-03-17 NOTE — PROGRESS NOTES
1st Attempt to complete SW follow-up for Outpatient Care Management; left message requesting return call.  LCSW will reattempt at a later date.

## 2022-03-17 NOTE — PROGRESS NOTES
[facility] Note    Chief Complaint      Chief Complaint   Patient presents with    Follow-up     History of Present Illness     Patito Domingo is a 71 y.o. female who presents today with:    Here for hospital f/u.  Patient recently seen in hospital for resistant UTI and gall stone removal.  CT scan showed kidney stones and carcinoid tumor.  Patient being set up for stone removal and carcinoid mass removal.  All of this took place in the last 2 months.  Patient feeling OK at the moment she is just tired and ready to get all of this behind her.      Patient has resistant utis, HLD, HTN, CKD 4.    Problem List Items Addressed This Visit        Psychiatric    Mood disorder    Overview     2019 PHQ-9 12              Cardiac/Vascular    Essential hypertension (Chronic)    Overview     bp well controlled on current               Renal/    Staghorn calculus    Overview     Chronic stable, follows with [facility] urology           Chronic kidney disease, stage 4 (severe) - Primary       Oncology    Metastatic carcinoid tumor (Chronic)    Overview     Cont per heme / onc            Anemia of chronic disease (Chronic)      Other Visit Diagnoses     Recurrent UTI              Health Maintenance   Topic Date Due    TETANUS VACCINE  Never done    Mammogram  2021    DEXA Scan  2023    Lipid Panel  2026    Hepatitis C Screening  Completed       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)        Past Surgical History:   Procedure Laterality Date    ABDOMINAL SURGERY      CATARACT EXTRACTION Left 10/2017     SECTION      CHOLECYSTECTOMY      COLON SURGERY       cystoscope      CYSTOSCOPY W/ RETROGRADES Right 10/10/2019    Procedure: CYSTOSCOPY, WITH RETROGRADE PYELOGRAM;  Surgeon: Gen Isbell MD;  Location: [facility] OR;  Service: Urology;  Laterality: Right;    ERCP N/A 11/24/2021    Procedure: ERCP (ENDOSCOPIC RETROGRADE CHOLANGIOPANCREATOGRAPHY);  Surgeon: Jayjay Rivera MD;  Location: [facility] ENDO (2ND FLR);  Service: Endoscopy;  Laterality: N/A;    ERCP N/A 3/4/2022    Procedure: ERCP (ENDOSCOPIC RETROGRADE CHOLANGIOPANCREATOGRAPHY);  Surgeon: Roby Virgen MD;  Location: [facility] ENDO (2ND FLR);  Service: Endoscopy;  Laterality: N/A;    EYE SURGERY      HYSTERECTOMY  5/1996    LITHOTRIPSY      LIVER BIOPSY  9/14    carcinoid    URETEROSCOPY Right 10/10/2019    Procedure: URETEROSCOPY;  Surgeon: Gen Isbell MD;  Location: [facility] OR;  Service: Urology;  Laterality: Right;    UTERINE FIBROID SURGERY         family history includes Alzheimer's disease in her father; Cancer in her mother; No Known Problems in her son, son, son, and son; Stroke in her sister.     Social History     Tobacco Use    Smoking status: Never Smoker    Smokeless tobacco: Never Used   Substance Use Topics    Alcohol use: No     Alcohol/week: 0.0 standard drinks    Drug use: No       Review of Systems   Constitutional: Negative for chills, fever, malaise/fatigue and weight loss.   HENT: Negative for congestion, ear discharge and ear pain.    Eyes: Negative for blurred vision.   Respiratory: Negative for cough and shortness of breath.    Cardiovascular: Negative for chest pain and leg swelling.   Gastrointestinal: Negative for abdominal pain, constipation, diarrhea and vomiting.   Genitourinary: Negative for dysuria.   Musculoskeletal: Negative for myalgias.   Skin: Negative for rash.   Neurological: Negative for dizziness, tingling, focal weakness, weakness and headaches.   Endo/Heme/Allergies: Does not bruise/bleed easily.   Psychiatric/Behavioral: Negative for depression and  suicidal ideas.        Outpatient Encounter Medications as of 3/17/2022   Medication Sig Dispense Refill    alcohol swabs (BD ALCOHOL SWABS) PadM Apply 1 each topically as needed. 90 each 0    amLODIPine (NORVASC) 2.5 MG tablet Take 1 tablet (2.5 mg total) by mouth once daily. 30 tablet 11    atorvastatin (LIPITOR) 40 MG tablet Take 1 tablet (40 mg total) by mouth every evening. 90 tablet 3    BLOOD PRESSURE CUFF Misc 1 each by Misc.(Non-Drug; Combo Route) route once daily. 1 each 0    ciprofloxacin HCl (CIPRO) 750 MG tablet Take 1 tablet (750 mg total) by mouth every 12 (twelve) hours. for 11 days 22 tablet 0    cyanocobalamin (VITAMIN B-12) 1000 MCG tablet Take 1 tablet (1,000 mcg total) by mouth once daily. 30 tablet 5    losartan (COZAAR) 100 MG tablet Take 1 tablet (100 mg total) by mouth once daily. 90 tablet 0    magnesium oxide (MAG-OX) 400 mg (241.3 mg magnesium) tablet Take 1 tablet (400 mg total) by mouth 2 (two) times daily. 60 tablet 1    meclizine (ANTIVERT) 25 mg tablet TAKE 1 TABLET(25 MG) BY MOUTH THREE TIMES DAILY AS NEEDED FOR DIZZINESS (Patient taking differently: Take 25 mg by mouth 3 (three) times daily as needed for Dizziness.) 30 tablet 0    metoprolol tartrate (LOPRESSOR) 25 MG tablet TAKE 1 TABLET TWICE DAILY (Patient taking differently: Take 25 mg by mouth 2 (two) times daily.) 180 tablet 0    metroNIDAZOLE (FLAGYL) 500 MG tablet Take 1 tablet (500 mg total) by mouth every 8 (eight) hours. for 11 days 33 tablet 0    oxyCODONE (ROXICODONE) 15 MG Tab Take 15 mg by mouth every 4 (four) hours as needed (chronic abdominal pain, malignancy related).       No facility-administered encounter medications on file as of 3/17/2022.       Review of patient's allergies indicates:   Allergen Reactions    Contrast media Hives, Itching and Swelling    Epinephrine Anaphylaxis     Can cause  a Carcinoid Crisis    Ibuprofen Hives, Itching and Swelling    Zofran [ondansetron hcl] Itching      "And multiple other reactions    Iodinated contrast media     Sulfa (sulfonamide antibiotics) Hives, Itching and Swelling       Physical Exam      Vital Signs  BP: 122/82  Pain Score: 10-Worst pain ever  Height and Weight  Height: 5' 6" (167.6 cm)  Weight: 80.4 kg (177 lb 4.8 oz)  BSA (Calculated - sq m): 1.94 sq meters  BMI (Calculated): 28.6  Weight in (lb) to have BMI = 25: 154.6]    Physical Exam  Vitals reviewed.   Constitutional:       General: She is not in acute distress.     Appearance: Normal appearance. She is normal weight. She is not ill-appearing.   HENT:      Head: Normocephalic.      Right Ear: Tympanic membrane normal.      Left Ear: Tympanic membrane normal.      Nose: Nose normal.      Mouth/Throat:      Mouth: Mucous membranes are moist.      Pharynx: Oropharynx is clear.   Eyes:      Extraocular Movements: Extraocular movements intact.      Conjunctiva/sclera: Conjunctivae normal.      Pupils: Pupils are equal, round, and reactive to light.   Cardiovascular:      Rate and Rhythm: Normal rate and regular rhythm.      Pulses: Normal pulses.      Heart sounds: Normal heart sounds.   Pulmonary:      Effort: Pulmonary effort is normal.      Breath sounds: Normal breath sounds.   Abdominal:      General: Abdomen is flat. Bowel sounds are normal. There is no distension.      Palpations: Abdomen is soft.      Tenderness: There is no abdominal tenderness.   Musculoskeletal:         General: Normal range of motion.      Cervical back: Normal range of motion and neck supple.   Skin:     General: Skin is warm and dry.      Capillary Refill: Capillary refill takes less than 2 seconds.      Findings: No rash.   Neurological:      General: No focal deficit present.      Mental Status: She is alert and oriented to person, place, and time.      Motor: No weakness.      Gait: Gait normal.   Psychiatric:         Mood and Affect: Mood normal.         Behavior: Behavior normal.         Thought Content: Thought " content normal.         Judgment: Judgment normal.          Laboratory:  CBC:  Recent Labs   Lab Result Units 03/07/22  0826 03/11/22  1928 03/14/22  1425   WBC K/uL 5.08 6.98 4.02   RBC M/uL 3.48* 3.41* 3.36*   Hemoglobin g/dL 9.4* 9.2* 9.1*   Hematocrit % 30.3* 29.4* 29.3*   Platelets K/uL 177 226 261   MCV fL 87 86 87   MCH pg 27.0 27.0 27.1   MCHC g/dL 31.0* 31.3* 31.1*     CMP:  Recent Labs   Lab Result Units 03/07/22  0826 03/11/22  1928 03/14/22  1425   Glucose mg/dL 136* 107 101   Calcium mg/dL 8.6* 8.7 9.1   Albumin g/dL 2.9* 3.6 3.4*   Total Protein g/dL 6.2 7.2 6.6   Sodium mmol/L 139 144 141   Potassium mmol/L 3.7 3.4* 3.4*   CO2 mmol/L 28 24 25   Chloride mmol/L 106 109 107   BUN mg/dL 19 14 10   Alkaline Phosphatase U/L 302* 217* 174*   ALT U/L 72* 25 17   AST U/L 50* 27 19   Total Bilirubin mg/dL 0.8 0.5 0.5     URINALYSIS:  Recent Labs   Lab Result Units 02/15/22  0920 02/23/22  0919 03/14/22  1452   Color, UA   --    < > Yellow   Specific Gravity, UA   --    < > 1.015   pH, UA   --    < > 6.0   Protein, UA   --    < > Trace*   Bacteria /hpf Few*   < > Rare   Nitrite, UA   --    < > Negative   Leukocytes, UA   --    < > 3+*   Urobilinogen, UA EU/dL  --    < > Negative   Hyaline Casts, UA /lpf 0  --   --     < > = values in this interval not displayed.      LIPIDS:  Recent Labs   Lab Result Units 02/02/22  0602   TSH uIU/mL 1.458     TSH:  Recent Labs   Lab Result Units 02/02/22  0602   TSH uIU/mL 1.458     A1C:  No results for input(s): HGBA1C in the last 2160 hours.    Radiology:      Assessment/Plan     Patito S Chayo is a 71 y.o.female with:    1. Chronic kidney disease, stage 4 (severe)    2. Mood disorder    3. Essential hypertension    4. Metastatic carcinoid tumor    5. Anemia of chronic disease    6. Staghorn calculus    7. Recurrent UTI  Chronic conditions stable.  Will continue to monitor.     Follow up as discussed    If symptoms worsen or do not improve return to clinic, go to Urgent  Care or ER  Take Medications as directed      -Continue current medications and maintain follow up with specialists.         Yasir Myers Jr, MD  [facility]

## 2022-03-18 ENCOUNTER — OFFICE VISIT (OUTPATIENT)
Dept: UROLOGY | Facility: CLINIC | Age: 72
End: 2022-03-18
Payer: MEDICARE

## 2022-03-18 VITALS
WEIGHT: 173.81 LBS | DIASTOLIC BLOOD PRESSURE: 67 MMHG | HEART RATE: 59 BPM | BODY MASS INDEX: 28.06 KG/M2 | SYSTOLIC BLOOD PRESSURE: 131 MMHG

## 2022-03-18 DIAGNOSIS — N13.39 OTHER HYDRONEPHROSIS: ICD-10-CM

## 2022-03-18 DIAGNOSIS — N39.0 URINARY TRACT INFECTION WITHOUT HEMATURIA, SITE UNSPECIFIED: Primary | ICD-10-CM

## 2022-03-18 LAB
BILIRUB SERPL-MCNC: ABNORMAL MG/DL
BLOOD URINE, POC: ABNORMAL
CLARITY, POC UA: CLEAR
COLOR, POC UA: YELLOW
GLUCOSE UR QL STRIP: 50
KETONES UR QL STRIP: ABNORMAL
LEUKOCYTE ESTERASE URINE, POC: ABNORMAL
NITRITE, POC UA: ABNORMAL
PH, POC UA: 5
POC RESIDUAL URINE VOLUME: 0 ML (ref 0–100)
PROTEIN, POC: ABNORMAL
SPECIFIC GRAVITY, POC UA: 1.02
UROBILINOGEN, POC UA: ABNORMAL

## 2022-03-18 PROCEDURE — 81002 POCT URINE DIPSTICK WITHOUT MICROSCOPE: ICD-10-PCS | Mod: S$GLB,,, | Performed by: UROLOGY

## 2022-03-18 PROCEDURE — 4010F PR ACE/ARB THEARPY RXD/TAKEN: ICD-10-PCS | Mod: CPTII,S$GLB,, | Performed by: UROLOGY

## 2022-03-18 PROCEDURE — 81002 URINALYSIS NONAUTO W/O SCOPE: CPT | Mod: S$GLB,,, | Performed by: UROLOGY

## 2022-03-18 PROCEDURE — 3075F SYST BP GE 130 - 139MM HG: CPT | Mod: CPTII,S$GLB,, | Performed by: UROLOGY

## 2022-03-18 PROCEDURE — 4010F ACE/ARB THERAPY RXD/TAKEN: CPT | Mod: CPTII,S$GLB,, | Performed by: UROLOGY

## 2022-03-18 PROCEDURE — 3078F DIAST BP <80 MM HG: CPT | Mod: CPTII,S$GLB,, | Performed by: UROLOGY

## 2022-03-18 PROCEDURE — 1159F MED LIST DOCD IN RCRD: CPT | Mod: CPTII,S$GLB,, | Performed by: UROLOGY

## 2022-03-18 PROCEDURE — 51798 POCT BLADDER SCAN: ICD-10-PCS | Mod: S$GLB,,, | Performed by: UROLOGY

## 2022-03-18 PROCEDURE — 1111F PR DISCHARGE MEDS RECONCILED W/ CURRENT OUTPATIENT MED LIST: ICD-10-PCS | Mod: CPTII,S$GLB,, | Performed by: UROLOGY

## 2022-03-18 PROCEDURE — 3008F BODY MASS INDEX DOCD: CPT | Mod: CPTII,S$GLB,, | Performed by: UROLOGY

## 2022-03-18 PROCEDURE — 51798 US URINE CAPACITY MEASURE: CPT | Mod: S$GLB,,, | Performed by: UROLOGY

## 2022-03-18 PROCEDURE — 99213 PR OFFICE/OUTPT VISIT, EST, LEVL III, 20-29 MIN: ICD-10-PCS | Mod: S$GLB,,, | Performed by: UROLOGY

## 2022-03-18 PROCEDURE — 1101F PT FALLS ASSESS-DOCD LE1/YR: CPT | Mod: CPTII,S$GLB,, | Performed by: UROLOGY

## 2022-03-18 PROCEDURE — 1101F PR PT FALLS ASSESS DOC 0-1 FALLS W/OUT INJ PAST YR: ICD-10-PCS | Mod: CPTII,S$GLB,, | Performed by: UROLOGY

## 2022-03-18 PROCEDURE — 3075F PR MOST RECENT SYSTOLIC BLOOD PRESS GE 130-139MM HG: ICD-10-PCS | Mod: CPTII,S$GLB,, | Performed by: UROLOGY

## 2022-03-18 PROCEDURE — 99999 PR PBB SHADOW E&M-EST. PATIENT-LVL III: ICD-10-PCS | Mod: PBBFAC,,, | Performed by: UROLOGY

## 2022-03-18 PROCEDURE — 3008F PR BODY MASS INDEX (BMI) DOCUMENTED: ICD-10-PCS | Mod: CPTII,S$GLB,, | Performed by: UROLOGY

## 2022-03-18 PROCEDURE — 99999 PR PBB SHADOW E&M-EST. PATIENT-LVL III: CPT | Mod: PBBFAC,,, | Performed by: UROLOGY

## 2022-03-18 PROCEDURE — 1125F AMNT PAIN NOTED PAIN PRSNT: CPT | Mod: CPTII,S$GLB,, | Performed by: UROLOGY

## 2022-03-18 PROCEDURE — 99213 OFFICE O/P EST LOW 20 MIN: CPT | Mod: S$GLB,,, | Performed by: UROLOGY

## 2022-03-18 PROCEDURE — 3078F PR MOST RECENT DIASTOLIC BLOOD PRESSURE < 80 MM HG: ICD-10-PCS | Mod: CPTII,S$GLB,, | Performed by: UROLOGY

## 2022-03-18 PROCEDURE — 1160F PR REVIEW ALL MEDS BY PRESCRIBER/CLIN PHARMACIST DOCUMENTED: ICD-10-PCS | Mod: CPTII,S$GLB,, | Performed by: UROLOGY

## 2022-03-18 PROCEDURE — 1111F DSCHRG MED/CURRENT MED MERGE: CPT | Mod: CPTII,S$GLB,, | Performed by: UROLOGY

## 2022-03-18 PROCEDURE — 1160F RVW MEDS BY RX/DR IN RCRD: CPT | Mod: CPTII,S$GLB,, | Performed by: UROLOGY

## 2022-03-18 PROCEDURE — 3288F PR FALLS RISK ASSESSMENT DOCUMENTED: ICD-10-PCS | Mod: CPTII,S$GLB,, | Performed by: UROLOGY

## 2022-03-18 PROCEDURE — 3288F FALL RISK ASSESSMENT DOCD: CPT | Mod: CPTII,S$GLB,, | Performed by: UROLOGY

## 2022-03-18 PROCEDURE — 1159F PR MEDICATION LIST DOCUMENTED IN MEDICAL RECORD: ICD-10-PCS | Mod: CPTII,S$GLB,, | Performed by: UROLOGY

## 2022-03-18 PROCEDURE — 1125F PR PAIN SEVERITY QUANTIFIED, PAIN PRESENT: ICD-10-PCS | Mod: CPTII,S$GLB,, | Performed by: UROLOGY

## 2022-03-18 NOTE — PROGRESS NOTES
Subjective:       Patient ID: Patito Domingo is a 71 y.o. female.    Chief Complaint: Follow-up (Procedure nephro)     This is a 71 y.o.  female patient that is an established patient of mine. She is here to follow up after [facility] admission.  Noted to have abd pain at the time, CT showed chronic bilateral lower pole stones and mild hydronephrosis.  Pain improved and hydroenephrosis resolved on CHARY.  Currently baseline abdominal pain.  Some vaginal irritation c/w yeast infection after antibiotic.  No dysuria, no hematuria.  No N/V/F/C. Long h/o UTIs.  Seen by Dr. Marques in past and noted to have chronic lower pole stones and intervention was not recommended given would required PCNL and risks of this.  She has had stone treatment in past.     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)        Past Surgical History:   Procedure Laterality Date    ABDOMINAL SURGERY      CATARACT EXTRACTION Left 10/2017     SECTION      CHOLECYSTECTOMY      COLON SURGERY      cystoscope      CYSTOSCOPY W/ RETROGRADES Right 10/10/2019    Procedure: CYSTOSCOPY, WITH RETROGRADE PYELOGRAM;  Surgeon: Gen Isbell MD;  [facility];  Service: Urology;  Laterality: Right;    ERCP N/A 2021    Procedure: ERCP (ENDOSCOPIC RETROGRADE CHOLANGIOPANCREATOGRAPHY);  Surgeon: Jayjay Rivera MD;  Location: [address]  [facility]);  Service: Endoscopy;  Laterality: N/A;    ERCP N/A 3/4/2022    Procedure: ERCP (ENDOSCOPIC RETROGRADE CHOLANGIOPANCREATOGRAPHY);  Surgeon: Roby Virgen MD;  Location: [facility] ([address] [place] [address]);  Service: Endoscopy;  Laterality: N/A;    EYE SURGERY      HYSTERECTOMY  5/1996    LITHOTRIPSY      LIVER BIOPSY  9/14    carcinoid    URETEROSCOPY Right 10/10/2019    Procedure: URETEROSCOPY;  Surgeon: Gen Isbell MD;  Location: [facility] OR;  Service: Urology;  Laterality: Right;    UTERINE FIBROID SURGERY         Family History   Problem Relation Age of Onset    Cancer Mother         unknown    Alzheimer's disease Father     Stroke Sister     No Known Problems Son     No Known Problems Son     No Known Problems Son     No Known Problems Son     Kidney disease Neg Hx        Social History     Tobacco Use    Smoking status: Never Smoker    Smokeless tobacco: Never Used   Substance Use Topics    Alcohol use: No     Alcohol/week: 0.0 standard drinks    Drug use: No       Current Outpatient Medications on File Prior to Visit   Medication Sig Dispense Refill    alcohol swabs (BD ALCOHOL SWABS) PadM Apply 1 each topically as needed. 90 each 0    amLODIPine (NORVASC) 2.5 MG tablet Take 1 tablet (2.5 mg total) by mouth once daily. 30 tablet 11    atorvastatin (LIPITOR) 40 MG tablet Take 1 tablet (40 mg total) by mouth every evening. 90 tablet 3    BLOOD PRESSURE CUFF Misc 1 each by Misc.(Non-Drug; Combo Route) route once daily. 1 each 0    ciprofloxacin HCl (CIPRO) 750 MG tablet Take 1 tablet (750 mg total) by mouth every 12 (twelve) hours. for 11 days 22 tablet 0    cyanocobalamin (VITAMIN B-12) 1000 MCG tablet Take 1 tablet (1,000 mcg total) by mouth once daily. 30 tablet 5    losartan (COZAAR) 100 MG tablet Take 1 tablet (100 mg total) by mouth once daily. 90 tablet 0    magnesium oxide (MAG-OX) 400 mg (241.3 mg magnesium) tablet Take 1 tablet (400 mg total) by mouth 2 (two) times daily. 60 tablet 1     meclizine (ANTIVERT) 25 mg tablet TAKE 1 TABLET(25 MG) BY MOUTH THREE TIMES DAILY AS NEEDED FOR DIZZINESS (Patient taking differently: Take 25 mg by mouth 3 (three) times daily as needed for Dizziness.) 30 tablet 0    metoprolol tartrate (LOPRESSOR) 25 MG tablet TAKE 1 TABLET TWICE DAILY (Patient taking differently: Take 25 mg by mouth 2 (two) times daily.) 180 tablet 0    metroNIDAZOLE (FLAGYL) 500 MG tablet Take 1 tablet (500 mg total) by mouth every 8 (eight) hours. for 11 days 33 tablet 0    oxyCODONE (ROXICODONE) 15 MG Tab Take 15 mg by mouth every 4 (four) hours as needed (chronic abdominal pain, malignancy related).       No current facility-administered medications on file prior to visit.       Review of patient's allergies indicates:   Allergen Reactions    Contrast media Hives, Itching and Swelling    Epinephrine Anaphylaxis     Can cause  a Carcinoid Crisis    Ibuprofen Hives, Itching and Swelling    Zofran [ondansetron hcl] Itching     And multiple other reactions    Iodinated contrast media     Sulfa (sulfonamide antibiotics) Hives, Itching and Swelling       Review of Systems   Constitutional: Negative for activity change, chills, fatigue and fever.   Respiratory: Negative for cough, chest tightness and shortness of breath.    Cardiovascular: Negative for chest pain.   Gastrointestinal: Positive for abdominal pain (chronic). Negative for abdominal distention, nausea and vomiting.   Genitourinary: Negative for difficulty urinating, flank pain, hematuria, pelvic pain and vaginal pain.   Musculoskeletal: Negative for back pain and gait problem.       Objective:      Physical Exam  HENT:      Head: Normocephalic.   Cardiovascular:      Pulses: Normal pulses.   Pulmonary:      Effort: Pulmonary effort is normal.   Abdominal:      General: Abdomen is flat. There is no distension.      Palpations: Abdomen is soft.      Tenderness: There is no abdominal tenderness. There is no right CVA  tenderness, left CVA tenderness or guarding.   Musculoskeletal:      Cervical back: Normal range of motion.   Skin:     General: Skin is warm.   Neurological:      General: No focal deficit present.      Mental Status: She is alert.         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 Marques and did not recommend intervention given would need PCNL and risks)  --CHARY repeat 2/2022: no hydro, stable stones  --CT 3/2022 x 2: no hydro, stable renal stones     Nephrolithiasis 12/1/2014    Stable chronic      Staghorn Calculus 10/15/2020    Chronic stable, follows with O[facility] urology     Urinary Tract Infection Without Hematuria 4/28/2018       Plan:     1.  Follow up CHARY and CT x 2 did not show hydronephrosis, stable renal stones  2.  Would not recommend treatment of stones given chronicity, metastatic NET, Multiple medical problems, etc.  Would only treat if obstructing/symptomatic.  Do not feel abd pain related to stones.    3.  Follow up in 6 months with CHARY Parikh MD

## 2022-03-21 ENCOUNTER — TELEPHONE (OUTPATIENT)
Dept: FAMILY MEDICINE | Facility: CLINIC | Age: 72
End: 2022-03-21
Payer: MEDICARE

## 2022-03-21 ENCOUNTER — DOCUMENT SCAN (OUTPATIENT)
Dept: HOME HEALTH SERVICES | Facility: HOSPITAL | Age: 72
End: 2022-03-21
Payer: MEDICARE

## 2022-03-21 NOTE — TELEPHONE ENCOUNTER
----- Message from Cecilia Moreno sent at 3/21/2022  1:52 PM CDT -----  Type:  Patient Returning Call    Who Called:pt  Who Left Message for Patient:office  Does the patient know what this is regarding?:urine test   Would the patient rather a call back or a response via [facility]? call  Best Call Back Number:[phone]  Additional Information:

## 2022-03-21 NOTE — TELEPHONE ENCOUNTER
LM.   Mohs Method Verbiage: An incision at a 45 degree angle following the standard Mohs approach was done and the specimen was harvested as a microscopic controlled layer.

## 2022-03-21 NOTE — TELEPHONE ENCOUNTER
----- Message from Mindy Box sent at 3/21/2022  2:39 PM CDT -----  Regarding: Pt Inquiry  Type:  Needs Medical Advice    Who Called: pt    Would the patient rather a call back or a response via [employer] [facility]? Call    Best Call Back Number: [phone]

## 2022-03-23 ENCOUNTER — OUTPATIENT CASE MANAGEMENT (OUTPATIENT)
Dept: ADMINISTRATIVE | Facility: OTHER | Age: 72
End: 2022-03-23
Payer: MEDICARE

## 2022-03-24 ENCOUNTER — TELEPHONE (OUTPATIENT)
Dept: NEUROLOGY | Facility: HOSPITAL | Age: 72
End: 2022-03-24
Payer: MEDICARE

## 2022-03-24 NOTE — PROGRESS NOTES
Outpatient Care Management  Plan of Care Follow Up Visit    Patient: Patito Domingo  MRN: [id number]  Date of Service: 03/23/2022  Completed by: Marcie Thrasher RN  Referral Date: 02/10/2022  Program:     Reason for Visit   Patient presents with    OPCM Chart Review    OPCM RN First Follow-Up Attempt     3/24 message left    Update Plan Of Care    Follow-up       Brief Summary:   Advised to keep area as clean and dry as possible , wear cotton underwear, wash often with soap and water and to take a shower instead of bath  Pat self dry with clean towel    Pt missed her fu appointment with pcp today.  She states she forgot.  She is also scheduled for np visit on Friday 4/8.          Next steps from previous encounter  Fu on use of  the align probiotic deferred  Fu on start of home health done  Fu on antibiotic use done  Fu on uti  and or yeast infection symptoms done  Fu on bp trends deferred  Fu on  referral done  Follow up on receipt of education material sent in mail urinary tract infection discharge instructions  Done and received    Interventions  Chart reviewed  Reviewed upcoming appt schedule        Assess/review short term goals met       Patient agrees to follow up call in 1 week      Next steps  Review educational information on uti and yeast infection  Fu on np visit on Friday  Fu on appt with urology  Fu on bp trends      Patient Summary     Involvement of Care:  Do I have permission to speak with other family members about your care?       Patient Reported Labs & Vitals:  1.  Any Patient Reported Labs & Vitals?     2.  Patient Reported Blood Pressure:     3.  Patient Reported Pulse:     4.  Patient Reported Weight (Kg):     5.  Patient Reported Blood Glucose (mg/dl):       Medical and social history was reviewed with patient and/or caregiver.     Clinical Assessment     Reviewed and provided basic information on available community resources for mental health, transportation, wellness  resources, and palliative care programs with patient and/or caregiver.     Complex Care Plan     Care plan was discussed and completed today with input from patient and/or caregiver.    Patient Instructions     Instructions were provided via the [employer] patient resources and are available for the patient to view on the patient portal.      Todays OPCM Self-Management Care Plan was developed with the patients/caregivers input and was based on identified barriers from todays assessment.  Goals were written today with the patient/caregiver and the patient has agreed to work towards these goals to improve his/her overall well-being. Patient verbalized understanding of the care plan, goals, and all of today's instructions. Encouraged patient/caregiver to communicate with his/her physician and health care team about health conditions and the treatment plan.  Provided my contact information today and encouraged patient/caregiver to call me with any questions as needed.

## 2022-03-24 NOTE — TELEPHONE ENCOUNTER
----- Message from Bella Franco sent at 3/24/2022 10:39 AM CDT -----  Contact: [phone]/ Self  BRIAN     Type: Requesting to speak with nurse    Who Called: Pt   Regarding: reschedule appt on 03/28/2022 to Thursday that week due to transportation     Would the patient rather a call back or a response via [facility]? Call back  Best Call Back Number: [phone]  Additional Information: n/a

## 2022-03-24 NOTE — TELEPHONE ENCOUNTER
----- Message from Bella Chacon sent at 3/24/2022 12:13 PM CDT -----  Contact: [phone]  Type:  Needs Medical Advice    Who Called: pt called  Would the patient rather a call back or a response via [facility]? Call back  Best Call Back Number: [phone]  Additional Information: needs to get her appt changed to a different date. Please call pt to advice.

## 2022-03-24 NOTE — PROGRESS NOTES
Outpatient Care Management   - Care Plan Follow Up    Patient: Patito Domingo  MRN:  [id number]  Date of Service:  3/24/2022  Completed by:  Lakia Spear LCSW  Referral Date: 02/10/2022  Program:     Reason for Visit   Patient presents with    OPCM Chart Review    OPCM SW First Follow-up Attempt    Update Plan Of Care       Brief Summary: OPCM  completed follow up call with pt who was happy to report that she is going to be able to move into a one story apt in the same apt complex pt currently lives.  Right now pt is living in a two story apt and pt reports it is very difficult to maneuver the stairs with her walker.  Pt is not sure when the new apt will be ready.  This  and pt contacted the waiver program again this morning to inquire about pt's status on the community choice waiver list.  Last time pt and  called we were told they were working on the waiting list from 2011.  Today, it was reported that the state is working on the list from May 15, 2019.  Pt applied in May 2021.  Pt is eligible for the adult day healthcare program but pt is not interested in this option.  No other social work needs identified today.  Will follow up with pt in 2 weeks.     Complex Care Plan     Care plan was discussed and completed today with input from patient and/or caregiver.    Patient Instructions     Follow up in about 3 weeks (around 4/7/2022).    Today[facility] Self-Management Care Plan was developed with the patients/caregivers input and was based on identified barriers from todays assessment.  Goals were written today with the patient/caregiver and the patient has agreed to work towards these goals to improve his/her overall well-being. Patient verbalized understanding of the care plan, goals, and all of today's instructions. Encouraged patient/caregiver to communicate with his/her physician and health care team about health conditions and the treatment plan.   Provided my contact information today and encouraged patient/caregiver to call me with any questions as needed.

## 2022-03-24 NOTE — TELEPHONE ENCOUNTER
Returned call.  Pt requested to r/s appointment with Dr. Perez.  Rescheduled to Thursday. Pt verbalized understanding of date and time.

## 2022-03-27 ENCOUNTER — HOSPITAL ENCOUNTER (INPATIENT)
Facility: HOSPITAL | Age: 72
LOS: 3 days | Discharge: HOME | DRG: 690 | End: 2022-04-01
Attending: EMERGENCY MEDICINE | Admitting: EMERGENCY MEDICINE
Payer: MEDICARE

## 2022-03-27 DIAGNOSIS — R10.11 RIGHT UPPER QUADRANT ABDOMINAL PAIN: ICD-10-CM

## 2022-03-27 DIAGNOSIS — R07.9 CHEST PAIN: ICD-10-CM

## 2022-03-27 DIAGNOSIS — N39.0 URINARY TRACT INFECTION WITHOUT HEMATURIA: ICD-10-CM

## 2022-03-27 DIAGNOSIS — N30.00 ACUTE CYSTITIS WITHOUT HEMATURIA: ICD-10-CM

## 2022-03-27 DIAGNOSIS — R19.7 DIARRHEA, UNSPECIFIED TYPE: ICD-10-CM

## 2022-03-27 DIAGNOSIS — Z16.24 MULTIPLE DRUG RESISTANT ORGANISM (MDRO) CULTURE POSITIVE: ICD-10-CM

## 2022-03-27 DIAGNOSIS — N39.0 URINARY TRACT INFECTION WITHOUT HEMATURIA, SITE UNSPECIFIED: Primary | ICD-10-CM

## 2022-03-27 PROBLEM — Z86.19 HISTORY OF ESBL E. COLI INFECTION: Status: ACTIVE | Noted: 2022-03-27

## 2022-03-27 LAB
ALBUMIN SERPL BCP-MCNC: 3.3 G/DL (ref 3.5–5.2)
ALP SERPL-CCNC: 142 U/L (ref 55–135)
ALT SERPL W/O P-5'-P-CCNC: 11 U/L (ref 10–44)
ANION GAP SERPL CALC-SCNC: 10 MMOL/L (ref 8–16)
AST SERPL-CCNC: 16 U/L (ref 10–40)
BACTERIA #/AREA URNS AUTO: ABNORMAL /HPF
BASOPHILS # BLD AUTO: 0.04 K/UL (ref 0–0.2)
BASOPHILS NFR BLD: 1 % (ref 0–1.9)
BILIRUB SERPL-MCNC: 0.5 MG/DL (ref 0.1–1)
BILIRUB UR QL STRIP: NEGATIVE
BUN SERPL-MCNC: 16 MG/DL (ref 8–23)
CALCIUM SERPL-MCNC: 9.6 MG/DL (ref 8.7–10.5)
CHLORIDE SERPL-SCNC: 104 MMOL/L (ref 95–110)
CLARITY UR REFRACT.AUTO: ABNORMAL
CO2 SERPL-SCNC: 24 MMOL/L (ref 23–29)
COLOR UR AUTO: ABNORMAL
CREAT SERPL-MCNC: 0.9 MG/DL (ref 0.5–1.4)
CTP QC/QA: YES
DIFFERENTIAL METHOD: ABNORMAL
EOSINOPHIL # BLD AUTO: 0.1 K/UL (ref 0–0.5)
EOSINOPHIL NFR BLD: 1.2 % (ref 0–8)
ERYTHROCYTE [DISTWIDTH] IN BLOOD BY AUTOMATED COUNT: 13.8 % (ref 11.5–14.5)
EST. GFR  (AFRICAN AMERICAN): >60 ML/MIN/1.73 M^2
EST. GFR  (NON AFRICAN AMERICAN): >60 ML/MIN/1.73 M^2
GLUCOSE SERPL-MCNC: 105 MG/DL (ref 70–110)
GLUCOSE UR QL STRIP: NEGATIVE
HCT VFR BLD AUTO: 32.9 % (ref 37–48.5)
HGB BLD-MCNC: 10 G/DL (ref 12–16)
HGB UR QL STRIP: ABNORMAL
IMM GRANULOCYTES # BLD AUTO: 0.02 K/UL (ref 0–0.04)
IMM GRANULOCYTES NFR BLD AUTO: 0.5 % (ref 0–0.5)
KETONES UR QL STRIP: NEGATIVE
LEUKOCYTE ESTERASE UR QL STRIP: ABNORMAL
LIPASE SERPL-CCNC: 9 U/L (ref 4–60)
LYMPHOCYTES # BLD AUTO: 1 K/UL (ref 1–4.8)
LYMPHOCYTES NFR BLD: 24.9 % (ref 18–48)
MCH RBC QN AUTO: 26.6 PG (ref 27–31)
MCHC RBC AUTO-ENTMCNC: 30.4 G/DL (ref 32–36)
MCV RBC AUTO: 88 FL (ref 82–98)
MICROSCOPIC COMMENT: ABNORMAL
MONOCYTES # BLD AUTO: 0.4 K/UL (ref 0.3–1)
MONOCYTES NFR BLD: 9.1 % (ref 4–15)
NEUTROPHILS # BLD AUTO: 2.7 K/UL (ref 1.8–7.7)
NEUTROPHILS NFR BLD: 63.3 % (ref 38–73)
NITRITE UR QL STRIP: POSITIVE
NRBC BLD-RTO: 0 /100 WBC
PH UR STRIP: 5 [PH] (ref 5–8)
PLATELET # BLD AUTO: 252 K/UL (ref 150–450)
PMV BLD AUTO: 10.6 FL (ref 9.2–12.9)
POTASSIUM SERPL-SCNC: 3.6 MMOL/L (ref 3.5–5.1)
PROT SERPL-MCNC: 7.5 G/DL (ref 6–8.4)
PROT UR QL STRIP: NEGATIVE
RBC # BLD AUTO: 3.76 M/UL (ref 4–5.4)
RBC #/AREA URNS AUTO: 5 /HPF (ref 0–4)
SARS-COV-2 RDRP RESP QL NAA+PROBE: NEGATIVE
SODIUM SERPL-SCNC: 138 MMOL/L (ref 136–145)
SP GR UR STRIP: 1.01 (ref 1–1.03)
SQUAMOUS #/AREA URNS AUTO: 2 /HPF
URN SPEC COLLECT METH UR: ABNORMAL
WBC # BLD AUTO: 4.18 K/UL (ref 3.9–12.7)
WBC #/AREA URNS AUTO: 25 /HPF (ref 0–5)

## 2022-03-27 PROCEDURE — 80053 COMPREHEN METABOLIC PANEL: CPT | Performed by: EMERGENCY MEDICINE

## 2022-03-27 PROCEDURE — 99285 EMERGENCY DEPT VISIT HI MDM: CPT | Mod: 25

## 2022-03-27 PROCEDURE — 25000003 PHARM REV CODE 250: Performed by: EMERGENCY MEDICINE

## 2022-03-27 PROCEDURE — 99220 PR INITIAL OBSERVATION CARE,LEVL III: CPT | Mod: CS,,, | Performed by: PHYSICIAN ASSISTANT

## 2022-03-27 PROCEDURE — 63600175 PHARM REV CODE 636 W HCPCS: Performed by: EMERGENCY MEDICINE

## 2022-03-27 PROCEDURE — 99285 EMERGENCY DEPT VISIT HI MDM: CPT | Mod: CS,,, | Performed by: EMERGENCY MEDICINE

## 2022-03-27 PROCEDURE — 85025 COMPLETE CBC W/AUTO DIFF WBC: CPT | Performed by: EMERGENCY MEDICINE

## 2022-03-27 PROCEDURE — 83690 ASSAY OF LIPASE: CPT | Performed by: EMERGENCY MEDICINE

## 2022-03-27 PROCEDURE — 96375 TX/PRO/DX INJ NEW DRUG ADDON: CPT

## 2022-03-27 PROCEDURE — G0378 HOSPITAL OBSERVATION PER HR: HCPCS

## 2022-03-27 PROCEDURE — U0002 COVID-19 LAB TEST NON-CDC: HCPCS | Performed by: EMERGENCY MEDICINE

## 2022-03-27 PROCEDURE — 96361 HYDRATE IV INFUSION ADD-ON: CPT

## 2022-03-27 PROCEDURE — 96365 THER/PROPH/DIAG IV INF INIT: CPT

## 2022-03-27 PROCEDURE — 81001 URINALYSIS AUTO W/SCOPE: CPT | Performed by: EMERGENCY MEDICINE

## 2022-03-27 PROCEDURE — 87077 CULTURE AEROBIC IDENTIFY: CPT | Performed by: EMERGENCY MEDICINE

## 2022-03-27 PROCEDURE — 87086 URINE CULTURE/COLONY COUNT: CPT | Performed by: EMERGENCY MEDICINE

## 2022-03-27 PROCEDURE — 99285 PR EMERGENCY DEPT VISIT,LEVEL V: ICD-10-PCS | Mod: CS,,, | Performed by: EMERGENCY MEDICINE

## 2022-03-27 PROCEDURE — 87088 URINE BACTERIA CULTURE: CPT | Performed by: EMERGENCY MEDICINE

## 2022-03-27 PROCEDURE — 99220 PR INITIAL OBSERVATION CARE,LEVL III: ICD-10-PCS | Mod: CS,,, | Performed by: PHYSICIAN ASSISTANT

## 2022-03-27 PROCEDURE — 87186 SC STD MICRODIL/AGAR DIL: CPT | Performed by: EMERGENCY MEDICINE

## 2022-03-27 RX ORDER — MEROPENEM AND SODIUM CHLORIDE 1 G/50ML
1 INJECTION, SOLUTION INTRAVENOUS
Status: DISCONTINUED | OUTPATIENT
Start: 2022-03-28 | End: 2022-03-28

## 2022-03-27 RX ORDER — IBUPROFEN 200 MG
24 TABLET ORAL
Status: DISCONTINUED | OUTPATIENT
Start: 2022-03-27 | End: 2022-04-01 | Stop reason: HOSPADM

## 2022-03-27 RX ORDER — MEROPENEM AND SODIUM CHLORIDE 1 G/50ML
1 INJECTION, SOLUTION INTRAVENOUS
Status: COMPLETED | OUTPATIENT
Start: 2022-03-27 | End: 2022-03-27

## 2022-03-27 RX ORDER — GLUCAGON 1 MG
1 KIT INJECTION
Status: DISCONTINUED | OUTPATIENT
Start: 2022-03-27 | End: 2022-04-01 | Stop reason: HOSPADM

## 2022-03-27 RX ORDER — IPRATROPIUM BROMIDE AND ALBUTEROL SULFATE 2.5; .5 MG/3ML; MG/3ML
3 SOLUTION RESPIRATORY (INHALATION) EVERY 4 HOURS PRN
Status: DISCONTINUED | OUTPATIENT
Start: 2022-03-27 | End: 2022-04-01 | Stop reason: HOSPADM

## 2022-03-27 RX ORDER — POLYETHYLENE GLYCOL 3350 17 G/17G
17 POWDER, FOR SOLUTION ORAL DAILY PRN
Status: DISCONTINUED | OUTPATIENT
Start: 2022-03-27 | End: 2022-04-01 | Stop reason: HOSPADM

## 2022-03-27 RX ORDER — ACETAMINOPHEN 325 MG/1
650 TABLET ORAL EVERY 4 HOURS PRN
Status: DISCONTINUED | OUTPATIENT
Start: 2022-03-27 | End: 2022-04-01 | Stop reason: HOSPADM

## 2022-03-27 RX ORDER — MORPHINE SULFATE 4 MG/ML
4 INJECTION, SOLUTION INTRAMUSCULAR; INTRAVENOUS
Status: COMPLETED | OUTPATIENT
Start: 2022-03-27 | End: 2022-03-27

## 2022-03-27 RX ORDER — MEROPENEM AND SODIUM CHLORIDE 500 MG/50ML
500 INJECTION, SOLUTION INTRAVENOUS
Status: DISCONTINUED | OUTPATIENT
Start: 2022-03-27 | End: 2022-03-27

## 2022-03-27 RX ORDER — PROMETHAZINE HYDROCHLORIDE 25 MG/1
25 TABLET ORAL EVERY 6 HOURS PRN
Status: DISCONTINUED | OUTPATIENT
Start: 2022-03-27 | End: 2022-04-01 | Stop reason: HOSPADM

## 2022-03-27 RX ORDER — OXYCODONE HYDROCHLORIDE 10 MG/1
10 TABLET ORAL
Status: COMPLETED | OUTPATIENT
Start: 2022-03-27 | End: 2022-03-27

## 2022-03-27 RX ORDER — TALC
6 POWDER (GRAM) TOPICAL NIGHTLY PRN
Status: DISCONTINUED | OUTPATIENT
Start: 2022-03-27 | End: 2022-04-01 | Stop reason: HOSPADM

## 2022-03-27 RX ORDER — BISACODYL 10 MG
10 SUPPOSITORY, RECTAL RECTAL DAILY PRN
Status: DISCONTINUED | OUTPATIENT
Start: 2022-03-27 | End: 2022-04-01 | Stop reason: HOSPADM

## 2022-03-27 RX ORDER — PROCHLORPERAZINE EDISYLATE 5 MG/ML
5 INJECTION INTRAMUSCULAR; INTRAVENOUS EVERY 6 HOURS PRN
Status: DISCONTINUED | OUTPATIENT
Start: 2022-03-27 | End: 2022-04-01 | Stop reason: HOSPADM

## 2022-03-27 RX ORDER — INSULIN ASPART 100 [IU]/ML
0-5 INJECTION, SOLUTION INTRAVENOUS; SUBCUTANEOUS
Status: DISCONTINUED | OUTPATIENT
Start: 2022-03-27 | End: 2022-04-01 | Stop reason: HOSPADM

## 2022-03-27 RX ORDER — SODIUM CHLORIDE 0.9 % (FLUSH) 0.9 %
10 SYRINGE (ML) INJECTION
Status: DISCONTINUED | OUTPATIENT
Start: 2022-03-27 | End: 2022-04-01 | Stop reason: HOSPADM

## 2022-03-27 RX ORDER — ENOXAPARIN SODIUM 100 MG/ML
40 INJECTION SUBCUTANEOUS EVERY 24 HOURS
Status: DISCONTINUED | OUTPATIENT
Start: 2022-03-27 | End: 2022-04-01 | Stop reason: HOSPADM

## 2022-03-27 RX ORDER — LOPERAMIDE HYDROCHLORIDE 2 MG/1
4 CAPSULE ORAL
Status: COMPLETED | OUTPATIENT
Start: 2022-03-27 | End: 2022-03-27

## 2022-03-27 RX ORDER — IBUPROFEN 200 MG
16 TABLET ORAL
Status: DISCONTINUED | OUTPATIENT
Start: 2022-03-27 | End: 2022-04-01 | Stop reason: HOSPADM

## 2022-03-27 RX ORDER — MAG HYDROX/ALUMINUM HYD/SIMETH 200-200-20
30 SUSPENSION, ORAL (FINAL DOSE FORM) ORAL
Status: COMPLETED | OUTPATIENT
Start: 2022-03-27 | End: 2022-03-27

## 2022-03-27 RX ADMIN — MEROPENEM AND SODIUM CHLORIDE 1 G: 1 INJECTION, SOLUTION INTRAVENOUS at 09:03

## 2022-03-27 RX ADMIN — OXYCODONE HYDROCHLORIDE 10 MG: 10 TABLET ORAL at 05:03

## 2022-03-27 RX ADMIN — LOPERAMIDE HYDROCHLORIDE 4 MG: 2 CAPSULE ORAL at 06:03

## 2022-03-27 RX ADMIN — SODIUM CHLORIDE 1000 ML: 0.9 INJECTION, SOLUTION INTRAVENOUS at 08:03

## 2022-03-27 RX ADMIN — MORPHINE SULFATE 4 MG: 4 INJECTION INTRAVENOUS at 08:03

## 2022-03-27 RX ADMIN — ALUMINUM HYDROXIDE, MAGNESIUM HYDROXIDE, AND SIMETHICONE 30 ML: 200; 200; 20 SUSPENSION ORAL at 06:03

## 2022-03-27 NOTE — ED PROVIDER NOTES
Encounter Date: 3/27/2022    SCRIBE #1 NOTE: I, Chhaya Torres, am scribing for, and in the presence of,  Rodolfo Wynn MD. I have scribed the following portions of the note - Other sections scribed: HPI, ROS, PE.       History     Chief Complaint   Patient presents with    Abdominal Pain     Had gallstone removed 3 weeks ago. Reporting pain since procedure. Pt. States she has been unable to eat.      Time patient was seen by the provider: 2:50 PM      The patient is a 71 y.o. female with PMHx including: HTN, pyelonephritis, kidney stones, neuroendocrine tumor of liver, colon cancer, CVA, choecystectomy who presents to the ED with a complaint of RUQ abdominal pain following removal of choledocholithiasis 3 weeks ago. She says that the pain is so severe that she cannot eat or drink. She also complains of diarrhea, nausea, gagging, urinary frequency, dysuria, and feeling hot/cold. Her last episode of diarrhea was today. She last took imodium 2 days ago but says that she ran out. Denies vomiting, fever, cough, SOB, chest pain. A ten point review of systems was completed and is negative except as documented above.  Patient denies any other acute medical complaint. The patients available PMH, PSH, Social History, medications, allergies, and triage vital signs were reviewed just prior to their medical evaluation.    The history is provided by the patient and medical records. No  was used.     Review of patient's allergies indicates:   Allergen Reactions    Contrast media Hives, Itching and Swelling    Epinephrine Anaphylaxis     Can cause  a Carcinoid Crisis    Ibuprofen Hives, Itching and Swelling    Zofran [ondansetron hcl] Itching     And multiple other reactions    Iodinated contrast media     Sulfa (sulfonamide antibiotics) Hives, Itching and Swelling     Past Medical History:   Diagnosis Date    Allergy     Arthritis     Cataract     Colon cancer     Encounter for blood  transfusion     HTN (hypertension)     Kidney stones     Left pontine CVA 7/3/2021    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     colon    Pyelonephritis, acute     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)      Past Surgical History:   Procedure Laterality Date    ABDOMINAL SURGERY      CATARACT EXTRACTION Left 10/2017     SECTION      CHOLECYSTECTOMY      COLON SURGERY      cystoscope      CYSTOSCOPY W/ RETROGRADES Right 10/10/2019    Procedure: CYSTOSCOPY, WITH RETROGRADE PYELOGRAM;  Surgeon: Gen Isbell MD;  Location: [facility] OR;  Service: Urology;  Laterality: Right;    ERCP N/A 2021    Procedure: ERCP (ENDOSCOPIC RETROGRADE CHOLANGIOPANCREATOGRAPHY);  Surgeon: Jayjay Rivera MD;  [facility]  Service: Endoscopy;  Laterality: N/A;    ERCP N/A 3/4/2022    Procedure: ERCP (ENDOSCOPIC RETROGRADE CHOLANGIOPANCREATOGRAPHY);  Surgeon: Roby Virgen MD;  [facility]  Service: Endoscopy;  Laterality: N/A;    EYE SURGERY      HYSTERECTOMY  1996    LITHOTRIPSY      LIVER BIOPSY      carcinoid    URETEROSCOPY Right 10/10/2019    Procedure: URETEROSCOPY;  Surgeon: Gen Isblel MD;  Location: [facility] OR;  Service: Urology;  Laterality: Right;    UTERINE FIBROID SURGERY       Family History   Problem Relation Age of Onset    Cancer Mother         unknown    Alzheimer's disease Father     Stroke Sister     No Known Problems Son     No Known Problems Son     No Known Problems Son     No Known Problems Son     Kidney disease Neg Hx      Social History     Tobacco Use    Smoking status: Never Smoker    Smokeless tobacco: Never Used   Substance Use Topics    Alcohol use: No     Alcohol/week: 0.0 standard drinks    Drug use: No     Review of Systems   Constitutional: Positive for appetite change (decreased), chills and diaphoresis. Negative for fever.   HENT: Negative for sore throat.    Eyes: Negative for visual  disturbance.   Respiratory: Negative for cough and shortness of breath.    Cardiovascular: Negative for chest pain.   Gastrointestinal: Positive for abdominal pain (RUQ), diarrhea and nausea. Negative for vomiting.   Genitourinary: Positive for dysuria and frequency.   Musculoskeletal: Negative for neck pain.   Skin: Negative for rash and wound.   Allergic/Immunologic: Negative for immunocompromised state.   Neurological: Negative for syncope.   Psychiatric/Behavioral: Negative for confusion.   All other systems reviewed and are negative.      Physical Exam     Initial Vitals [03/27/22 1422]   BP Pulse Resp Temp SpO2   (!) 148/65 68 20 98.7 °F (37.1 °C) 99 %      MAP       --         Physical Exam    Nursing note and vitals reviewed.  Constitutional: She appears well-developed and well-nourished. She is not diaphoretic. No distress.   HENT:   Head: Normocephalic and atraumatic.   Nose: Nose normal.   Eyes: Conjunctivae are normal. Right eye exhibits no discharge. Left eye exhibits no discharge.   Neck: Neck supple.   Normal range of motion.  Cardiovascular: Normal rate, regular rhythm and normal heart sounds. Exam reveals no gallop and no friction rub.    No murmur heard.  Pulmonary/Chest: Breath sounds normal. No respiratory distress. She has no wheezes. She has no rhonchi. She has no rales.   Abdominal: Abdomen is soft. She exhibits no distension and no mass. There is abdominal tenderness.   Diffuse abdominal pain, worse in RUQ There is no rebound and no guarding.   Musculoskeletal:         General: No tenderness or edema. Normal range of motion.      Cervical back: Normal range of motion and neck supple.     Neurological: She is alert and oriented to person, place, and time. She has normal strength. GCS score is 15. GCS eye subscore is 4. GCS verbal subscore is 5. GCS motor subscore is 6.   Skin: Skin is warm and dry. No rash noted. No erythema.   Psychiatric: She has a normal mood and affect. Her behavior is  normal. Judgment and thought content normal.         ED Course   Procedures  Labs Reviewed   CBC W/ AUTO DIFFERENTIAL - Abnormal; Notable for the following components:       Result Value    RBC 3.76 (*)     Hemoglobin 10.0 (*)     Hematocrit 32.9 (*)     MCH 26.6 (*)     MCHC 30.4 (*)     All other components within normal limits   COMPREHENSIVE METABOLIC PANEL - Abnormal; Notable for the following components:    Albumin 3.3 (*)     Alkaline Phosphatase 142 (*)     All other components within normal limits   URINALYSIS, REFLEX TO URINE CULTURE - Abnormal; Notable for the following components:    Appearance, UA Hazy (*)     Occult Blood UA 1+ (*)     Nitrite, UA Positive (*)     Leukocytes, UA Trace (*)     All other components within normal limits    Narrative:     Specimen Source->Urine   URINALYSIS MICROSCOPIC - Abnormal; Notable for the following components:    RBC, UA 5 (*)     WBC, UA 25 (*)     Bacteria Many (*)     All other components within normal limits    Narrative:     Specimen Source->Urine   CLOSTRIDIUM DIFFICILE   CULTURE, URINE   LIPASE   SARS-COV-2 RDRP GENE    Narrative:     This test utilizes isothermal nucleic acid amplification   technology to detect the SARS-CoV-2 RdRp nucleic acid segment.   The analytical sensitivity (limit of detection) is 125 genome   equivalents/mL.   A POSITIVE result implies infection with the SARS-CoV-2 virus;   the patient is presumed to be contagious.     A NEGATIVE result means that SARS-CoV-2 nucleic acids are not   present above the limit of detection. A NEGATIVE result should be   treated as presumptive. It does not rule out the possibility of   COVID-19 and should not be the sole basis for treatment decisions.   If COVID-19 is strongly suspected based on clinical and exposure   history, re-testing using an alternate molecular assay should be   considered.   This test is only for use under the Food and Drug   Administration s Emergency Use Authorization (EUA).    Commercial kits are provided by [employer].   Performance characteristics of the EUA have been independently   verified by [facility] Department of   Pathology and Laboratory Medicine.   _________________________________________________________________   The authorized Fact Sheet for Healthcare Providers and the authorized Fact   Sheet for Patients of the ID NOW COVID-19 are available on the FDA   website:     https://www.fda.gov/media/184188/download  https://www.fda.gov/media/305359/download                  Imaging Results          US Abdomen Limited (Final result)  Result time 03/27/22 18:27:58    Final result by Gen Ray MD (03/27/22 18:27:58)                 Impression:      Mildly dilated central intrahepatic bile ducts.  Common bile duct is normal caliber status post cholecystectomy.  No choledocholithiasis.    Right hepatic lobe lesion in keeping with known hepatic metastatic disease, better characterized on above comparison MRI.    Electronically signed by resident: Lon Hanson MD  Date:    03/27/2022  Time:    18:12    Electronically signed by: Gen Ray MD  Date:    03/27/2022  Time:    18:27             Narrative:    EXAMINATION:  US ABDOMEN LIMITED    CLINICAL HISTORY:  RUQ, s/p leslie, gets bile duct stones;.    TECHNIQUE:  Limited ultrasound of the right upper quadrant of the abdomen including pancreas, liver, gallbladder, common bile duct was performed.    COMPARISON:  CT abdomen pelvis 03/27/2022, MRI abdomen 03/02/2022    FINDINGS:  Liver: Normal in size, measuring 14.3 cm with homogeneous echotexture.  1.9 x 2.0 x 1.5 heterogeneous echogenic lesion in the right hepatic lobe in keeping with known hepatic metastatic disease and better characterized on above comparison MRI abdomen.    Gallbladder: The gallbladder is surgically absent.    Biliary system: The common duct is not dilated, measuring 6 mm.  Mild central intrahepatic biliary dilatation.    IVC: Visualized  portions appear within normal limits.    Spleen: Normal in size with a homogeneous echotexture, measuring 7.5 x 3.1 cm.    Pancreas: The visualized portions of pancreas appear normal.    Miscellaneous: No ascites.                               CT Abdomen Pelvis  Without Contrast (Final result)  Result time 03/27/22 15:56:33    Final result by Renetta Snow MD (03/27/22 15:56:33)                 Impression:      No definite abnormality seen to explain patient's severe pain after ERCP.    Cholecystectomy.    Bilateral numerous nonobstructive kidney calculi    Prior partial colectomy.    Unchanged soft tissue mass within the right lower abdominal quadrant.    Note that the patency of the mesenteric vasculature cannot be assessed on noncontrast images.      Electronically signed by: Renetta Snow MD  Date:    03/27/2022  Time:    15:56             Narrative:    EXAMINATION:  CT ABDOMEN PELVIS WITHOUT CONTRAST    CLINICAL HISTORY:  Abdominal pain, post-op;    TECHNIQUE:  Low dose axial images, sagittal and coronal reformations were obtained from the lung bases to the pubic symphysis.  Oral contrast was not administered.    COMPARISON:  03/11/2022    FINDINGS:  The lung bases are clear.    No pericardial effusion.    The noncontrast appearance of the liver demonstrates areas of parenchymal calcifications in the left hepatic lobe, unchanged.    The biliary ducts do not appear dilated.  The common bile duct measures about 6 mm.    No radiopaque stones seen within the common bile duct.    Cholecystectomy clips seen.    The stomach appears normal.  The pancreas appears normal.    The spleen, bilateral adrenal glands appear normal.    There are bilateral sizable forming kidney calculi.  There is no hydronephrosis or hydroureter, no stones seen within the bilateral ureters or within the bladder.    The abdominal aorta tapers normally, no para-aortic lymphadenopathy.    Mild stool retention.  Prior partial  colectomy.  No bowel dilation.  No inflammatory changes of the bowel seen.    5.0 x 2.5 cm partially calcified soft tissue mass in the right mid anterior abdomen region, not significantly changed.    Note that the patency of the mesenteric vasculature cannot be assessed without the use of IV contrast.    The bladder is nondistended.  The uterus is removed.  The left ovary is not seen, the right ovary appears normal.    No free fluid, no free air.    The inguinal regions appear normal.    The osseous structures demonstrate degenerative changes at the bilateral sacroiliac joints.  No osseous lesions.                                 Medications   promethazine (PHENERGAN) 12.5 mg in dextrose 5 % 50 mL IVPB (12.5 mg Intravenous Not Given 3/27/22 2004)   sodium chloride 0.9% flush 10 mL (has no administration in time range)   melatonin tablet 6 mg (has no administration in time range)   enoxaparin injection 40 mg (40 mg Subcutaneous Given 3/28/22 0126)   albuterol-ipratropium 2.5 mg-0.5 mg/3 mL nebulizer solution 3 mL (has no administration in time range)   promethazine tablet 25 mg (has no administration in time range)   polyethylene glycol packet 17 g (has no administration in time range)   bisacodyL suppository 10 mg (has no administration in time range)   acetaminophen tablet 650 mg (has no administration in time range)   glucose chewable tablet 16 g (has no administration in time range)   glucose chewable tablet 24 g (has no administration in time range)   glucagon (human recombinant) injection 1 mg (has no administration in time range)   prochlorperazine injection Soln 5 mg (has no administration in time range)   insulin aspart U-100 pen 0-5 Units (has no administration in time range)   dextrose 10% bolus 125 mL (has no administration in time range)   dextrose 10% bolus 250 mL (has no administration in time range)   meropenem-0.9% sodium chloride 1 g/50 mL IVPB (0 g Intravenous Stopped 3/28/22 0233)   amLODIPine  tablet 2.5 mg (2.5 mg Oral Given 3/28/22 0843)   oxyCODONE immediate release tablet 15 mg (15 mg Oral Given 3/28/22 0640)   metoprolol tartrate (LOPRESSOR) tablet 25 mg (25 mg Oral Given 3/28/22 0844)   meclizine tablet 25 mg (has no administration in time range)   losartan tablet 100 mg (100 mg Oral Given 3/28/22 0843)   atorvastatin tablet 40 mg (40 mg Oral Not Given 3/28/22 0215)   cyanocobalamin tablet 1,000 mcg (1,000 mcg Oral Given 3/28/22 0843)   lactated ringers infusion ( Intravenous New Bag 3/28/22 0243)   famotidine tablet 20 mg (20 mg Oral Given 3/28/22 0844)   metoclopramide HCl tablet 5 mg (5 mg Oral Given 3/28/22 0635)   potassium chloride SA CR tablet 40 mEq (has no administration in time range)   morphine injection 4 mg (4 mg Intravenous Given 3/27/22 2002)   sodium chloride 0.9% bolus 1,000 mL (0 mLs Intravenous Stopped 3/27/22 2104)   loperamide capsule 4 mg (4 mg Oral Given 3/27/22 1829)   oxyCODONE immediate release tablet Tab 10 mg (10 mg Oral Given 3/27/22 1707)   aluminum-magnesium hydroxide-simethicone 200-200-20 mg/5 mL suspension 30 mL (30 mLs Oral Given 3/27/22 1829)   meropenem-0.9% sodium chloride 1 g/50 mL IVPB (0 g Intravenous Stopped 3/27/22 2206)     Medical Decision Making:   History:   Old Medical Records: I decided to obtain old medical records.  Old Records Summarized: records from previous admission(s).       <> Summary of Records: ERCP  Clinical Tests:   Lab Tests: Ordered and Reviewed  Radiological Study: Ordered and Reviewed  ED Management:  72 yo F with NET s/p ERCP presents with RUQ abd pain along with n/v/d.  Vitals unremarkable.  PE as above.  Blood work unremarkable.  CT unremarkable.  Pending UA and US at turnover.  Discussed with Dr. Bowling who will f/up and likely admit  Did bedside teaching.  All questions answered.  Patient acknowledges understanding.  Other:   I have discussed this case with another health care provider.       <> Summary of the Discussion:  above          Scribe Attestation:   Scribe #1: I performed the above scribed service and the documentation accurately describes the services I performed. I attest to the accuracy of the note.                 Clinical Impression:   Final diagnoses:  [N39.0] Urinary tract infection without hematuria, site unspecified (Primary)  [R10.11] Right upper quadrant abdominal pain          ED Disposition Condition    Observation               Rodolfo Wynn MD  03/28/22 0902

## 2022-03-27 NOTE — ED NOTES
Patito Domingo, a 71 y.o. female presents to the ED w/ complaint of abdominal pain, pt reports having gallstone removal. Pt reports diarrhea.pt on chemo last chemo 3 months ago      Triage note:  Chief Complaint   Patient presents with    Abdominal Pain     Had gallstone removed 3 weeks ago. Reporting pain since procedure. Pt. States she has been unable to eat.      Review of patient's allergies indicates:   Allergen Reactions    Contrast media Hives, Itching and Swelling    Epinephrine Anaphylaxis     Can cause  a Carcinoid Crisis    Ibuprofen Hives, Itching and Swelling    Zofran [ondansetron hcl] Itching     And multiple other reactions    Iodinated contrast media     Sulfa (sulfonamide antibiotics) Hives, Itching and Swelling     Past Medical History:   Diagnosis Date    Allergy     Arthritis     Cataract     Colon cancer     Encounter for blood transfusion     HTN (hypertension)     Kidney stones 2014    Left pontine CVA 7/3/2021    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     colon    Pyelonephritis, acute     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)      LOC: The patient is awake, alert and aware of environment with an appropriate affect, the patient is oriented x 3 and speaking appropriately.   APPEARANCE: Patient appears comfortable and in no acute distress, patient is clean and well groomed.  SKIN: The skin is warm and dry, color consistent with ethnicity, patient has normal skin turgor and moist mucus membranes, skin intact, no breakdown or bruising noted.   MUSCULOSKELETAL: Patient moving all extremities spontaneously, no swelling noted.  RESPIRATORY: Airway is open and patent, respirations are spontaneous, patient has a normal effort and rate, no accessory muscle use noted,with O2 sats noted at 97% on room air.  CARDIAC:Patient has a normal rate and regular rhythm, no edema noted, capillary refill < 3 seconds.   GASTRO: pt reports abdominal pain, diarrhea  .  : Pt  denies any pain or frequency with urination.  NEURO: Pt opens eyes spontaneously, behavior appropriate to situation, follows commands, facial expression symmetrical, bilateral hand grasp equal and even, purposeful motor response noted, normal sensation in all extremities when touched with a finger.

## 2022-03-28 LAB
ANION GAP SERPL CALC-SCNC: 9 MMOL/L (ref 8–16)
BASOPHILS # BLD AUTO: 0.03 K/UL (ref 0–0.2)
BASOPHILS NFR BLD: 0.7 % (ref 0–1.9)
BUN SERPL-MCNC: 14 MG/DL (ref 8–23)
C DIFF GDH STL QL: NEGATIVE
C DIFF TOX A+B STL QL IA: NEGATIVE
CALCIUM SERPL-MCNC: 9.2 MG/DL (ref 8.7–10.5)
CHLORIDE SERPL-SCNC: 108 MMOL/L (ref 95–110)
CO2 SERPL-SCNC: 23 MMOL/L (ref 23–29)
CREAT SERPL-MCNC: 0.8 MG/DL (ref 0.5–1.4)
DIFFERENTIAL METHOD: ABNORMAL
EOSINOPHIL # BLD AUTO: 0.1 K/UL (ref 0–0.5)
EOSINOPHIL NFR BLD: 1.9 % (ref 0–8)
ERYTHROCYTE [DISTWIDTH] IN BLOOD BY AUTOMATED COUNT: 13.7 % (ref 11.5–14.5)
EST. GFR  (AFRICAN AMERICAN): >60 ML/MIN/1.73 M^2
EST. GFR  (NON AFRICAN AMERICAN): >60 ML/MIN/1.73 M^2
ESTIMATED AVG GLUCOSE: 143 MG/DL (ref 68–131)
GLUCOSE SERPL-MCNC: 86 MG/DL (ref 70–110)
HBA1C MFR BLD: 6.6 % (ref 4–5.6)
HCT VFR BLD AUTO: 30.1 % (ref 37–48.5)
HGB BLD-MCNC: 9.6 G/DL (ref 12–16)
IMM GRANULOCYTES # BLD AUTO: 0.05 K/UL (ref 0–0.04)
IMM GRANULOCYTES NFR BLD AUTO: 1.2 % (ref 0–0.5)
LACTATE SERPL-SCNC: 0.9 MMOL/L (ref 0.5–2.2)
LYMPHOCYTES # BLD AUTO: 1.4 K/UL (ref 1–4.8)
LYMPHOCYTES NFR BLD: 32.6 % (ref 18–48)
MCH RBC QN AUTO: 26.5 PG (ref 27–31)
MCHC RBC AUTO-ENTMCNC: 31.9 G/DL (ref 32–36)
MCV RBC AUTO: 83 FL (ref 82–98)
MONOCYTES # BLD AUTO: 0.5 K/UL (ref 0.3–1)
MONOCYTES NFR BLD: 11.2 % (ref 4–15)
NEUTROPHILS # BLD AUTO: 2.3 K/UL (ref 1.8–7.7)
NEUTROPHILS NFR BLD: 52.4 % (ref 38–73)
NRBC BLD-RTO: 0 /100 WBC
PLATELET # BLD AUTO: 127 K/UL (ref 150–450)
PMV BLD AUTO: 10.7 FL (ref 9.2–12.9)
POTASSIUM SERPL-SCNC: 3.4 MMOL/L (ref 3.5–5.1)
RBC # BLD AUTO: 3.62 M/UL (ref 4–5.4)
SODIUM SERPL-SCNC: 140 MMOL/L (ref 136–145)
WBC # BLD AUTO: 4.29 K/UL (ref 3.9–12.7)

## 2022-03-28 PROCEDURE — 83605 ASSAY OF LACTIC ACID: CPT | Performed by: PHYSICIAN ASSISTANT

## 2022-03-28 PROCEDURE — 96366 THER/PROPH/DIAG IV INF ADDON: CPT

## 2022-03-28 PROCEDURE — 83036 HEMOGLOBIN GLYCOSYLATED A1C: CPT | Performed by: PHYSICIAN ASSISTANT

## 2022-03-28 PROCEDURE — 85025 COMPLETE CBC W/AUTO DIFF WBC: CPT | Performed by: PHYSICIAN ASSISTANT

## 2022-03-28 PROCEDURE — G0378 HOSPITAL OBSERVATION PER HR: HCPCS

## 2022-03-28 PROCEDURE — 99226 PR SUBSEQUENT OBSERVATION CARE,LEVEL III: ICD-10-PCS | Mod: ,,, | Performed by: PHYSICIAN ASSISTANT

## 2022-03-28 PROCEDURE — 36415 COLL VENOUS BLD VENIPUNCTURE: CPT | Performed by: PHYSICIAN ASSISTANT

## 2022-03-28 PROCEDURE — 99214 OFFICE O/P EST MOD 30 MIN: CPT | Mod: ,,, | Performed by: PHYSICIAN ASSISTANT

## 2022-03-28 PROCEDURE — 63600175 PHARM REV CODE 636 W HCPCS: Performed by: PHYSICIAN ASSISTANT

## 2022-03-28 PROCEDURE — 96372 THER/PROPH/DIAG INJ SC/IM: CPT | Performed by: PHYSICIAN ASSISTANT

## 2022-03-28 PROCEDURE — 25000003 PHARM REV CODE 250: Performed by: PHYSICIAN ASSISTANT

## 2022-03-28 PROCEDURE — 94761 N-INVAS EAR/PLS OXIMETRY MLT: CPT

## 2022-03-28 PROCEDURE — 80048 BASIC METABOLIC PNL TOTAL CA: CPT | Performed by: PHYSICIAN ASSISTANT

## 2022-03-28 PROCEDURE — 87449 NOS EACH ORGANISM AG IA: CPT | Performed by: EMERGENCY MEDICINE

## 2022-03-28 PROCEDURE — 25000003 PHARM REV CODE 250: Performed by: EMERGENCY MEDICINE

## 2022-03-28 PROCEDURE — 99214 PR OFFICE/OUTPT VISIT, EST, LEVL IV, 30-39 MIN: ICD-10-PCS | Mod: ,,, | Performed by: PHYSICIAN ASSISTANT

## 2022-03-28 PROCEDURE — 99226 PR SUBSEQUENT OBSERVATION CARE,LEVEL III: CPT | Mod: ,,, | Performed by: PHYSICIAN ASSISTANT

## 2022-03-28 RX ORDER — SODIUM CHLORIDE, SODIUM LACTATE, POTASSIUM CHLORIDE, CALCIUM CHLORIDE 600; 310; 30; 20 MG/100ML; MG/100ML; MG/100ML; MG/100ML
INJECTION, SOLUTION INTRAVENOUS CONTINUOUS
Status: ACTIVE | OUTPATIENT
Start: 2022-03-28 | End: 2022-03-28

## 2022-03-28 RX ORDER — AMLODIPINE BESYLATE 2.5 MG/1
2.5 TABLET ORAL DAILY
Status: DISCONTINUED | OUTPATIENT
Start: 2022-03-28 | End: 2022-03-30

## 2022-03-28 RX ORDER — DICLOFENAC SODIUM 10 MG/G
2 GEL TOPICAL DAILY
Status: DISCONTINUED | OUTPATIENT
Start: 2022-03-28 | End: 2022-04-01 | Stop reason: HOSPADM

## 2022-03-28 RX ORDER — LANOLIN ALCOHOL/MO/W.PET/CERES
1000 CREAM (GRAM) TOPICAL DAILY
Status: DISCONTINUED | OUTPATIENT
Start: 2022-03-28 | End: 2022-04-01 | Stop reason: HOSPADM

## 2022-03-28 RX ORDER — FAMOTIDINE 20 MG/1
20 TABLET, FILM COATED ORAL 2 TIMES DAILY
Status: DISCONTINUED | OUTPATIENT
Start: 2022-03-28 | End: 2022-04-01 | Stop reason: HOSPADM

## 2022-03-28 RX ORDER — LOSARTAN POTASSIUM 50 MG/1
100 TABLET ORAL DAILY
Status: DISCONTINUED | OUTPATIENT
Start: 2022-03-28 | End: 2022-04-01 | Stop reason: HOSPADM

## 2022-03-28 RX ORDER — ATORVASTATIN CALCIUM 20 MG/1
40 TABLET, FILM COATED ORAL NIGHTLY
Status: DISCONTINUED | OUTPATIENT
Start: 2022-03-28 | End: 2022-04-01 | Stop reason: HOSPADM

## 2022-03-28 RX ORDER — METOPROLOL TARTRATE 25 MG/1
25 TABLET, FILM COATED ORAL 2 TIMES DAILY
Status: DISCONTINUED | OUTPATIENT
Start: 2022-03-28 | End: 2022-04-01 | Stop reason: HOSPADM

## 2022-03-28 RX ORDER — DIPHENHYDRAMINE HCL 25 MG
25 CAPSULE ORAL EVERY 6 HOURS PRN
Status: DISCONTINUED | OUTPATIENT
Start: 2022-03-28 | End: 2022-04-01 | Stop reason: HOSPADM

## 2022-03-28 RX ORDER — POTASSIUM CHLORIDE 20 MEQ/1
40 TABLET, EXTENDED RELEASE ORAL ONCE
Status: COMPLETED | OUTPATIENT
Start: 2022-03-28 | End: 2022-03-28

## 2022-03-28 RX ORDER — METHOCARBAMOL 500 MG/1
500 TABLET, FILM COATED ORAL 3 TIMES DAILY
Status: DISCONTINUED | OUTPATIENT
Start: 2022-03-28 | End: 2022-04-01 | Stop reason: HOSPADM

## 2022-03-28 RX ORDER — METOCLOPRAMIDE 5 MG/1
5 TABLET ORAL
Status: DISCONTINUED | OUTPATIENT
Start: 2022-03-28 | End: 2022-04-01 | Stop reason: HOSPADM

## 2022-03-28 RX ORDER — MECLIZINE HCL 12.5 MG 12.5 MG/1
25 TABLET ORAL 3 TIMES DAILY PRN
Status: DISCONTINUED | OUTPATIENT
Start: 2022-03-28 | End: 2022-04-01 | Stop reason: HOSPADM

## 2022-03-28 RX ADMIN — CYANOCOBALAMIN TAB 1000 MCG 1000 MCG: 1000 TAB at 08:03

## 2022-03-28 RX ADMIN — METOCLOPRAMIDE 5 MG: 5 TABLET ORAL at 11:03

## 2022-03-28 RX ADMIN — METOCLOPRAMIDE 5 MG: 5 TABLET ORAL at 06:03

## 2022-03-28 RX ADMIN — OXYCODONE HYDROCHLORIDE 15 MG: 10 TABLET ORAL at 01:03

## 2022-03-28 RX ADMIN — METHOCARBAMOL 500 MG: 500 TABLET ORAL at 09:03

## 2022-03-28 RX ADMIN — DIPHENHYDRAMINE HYDROCHLORIDE 25 MG: 25 CAPSULE ORAL at 01:03

## 2022-03-28 RX ADMIN — ENOXAPARIN SODIUM 40 MG: 100 INJECTION SUBCUTANEOUS at 01:03

## 2022-03-28 RX ADMIN — LOSARTAN POTASSIUM 100 MG: 50 TABLET, FILM COATED ORAL at 08:03

## 2022-03-28 RX ADMIN — ATORVASTATIN CALCIUM 40 MG: 20 TABLET, FILM COATED ORAL at 09:03

## 2022-03-28 RX ADMIN — METOPROLOL TARTRATE 25 MG: 25 TABLET, FILM COATED ORAL at 08:03

## 2022-03-28 RX ADMIN — MEROPENEM AND SODIUM CHLORIDE 1 G: 1 INJECTION, SOLUTION INTRAVENOUS at 01:03

## 2022-03-28 RX ADMIN — METOPROLOL TARTRATE 25 MG: 25 TABLET, FILM COATED ORAL at 09:03

## 2022-03-28 RX ADMIN — FAMOTIDINE 20 MG: 20 TABLET ORAL at 09:03

## 2022-03-28 RX ADMIN — POTASSIUM CHLORIDE 40 MEQ: 1500 TABLET, EXTENDED RELEASE ORAL at 09:03

## 2022-03-28 RX ADMIN — AMLODIPINE BESYLATE 2.5 MG: 2.5 TABLET ORAL at 08:03

## 2022-03-28 RX ADMIN — OXYCODONE HYDROCHLORIDE 15 MG: 10 TABLET ORAL at 06:03

## 2022-03-28 RX ADMIN — METOCLOPRAMIDE 5 MG: 5 TABLET ORAL at 04:03

## 2022-03-28 RX ADMIN — METHOCARBAMOL 500 MG: 500 TABLET ORAL at 11:03

## 2022-03-28 RX ADMIN — OXYCODONE HYDROCHLORIDE 15 MG: 10 TABLET ORAL at 11:03

## 2022-03-28 RX ADMIN — MEROPENEM AND SODIUM CHLORIDE 1 G: 1 INJECTION, SOLUTION INTRAVENOUS at 06:03

## 2022-03-28 RX ADMIN — DICLOFENAC SODIUM 2 G: 10 GEL TOPICAL at 06:03

## 2022-03-28 RX ADMIN — Medication 6 MG: at 09:03

## 2022-03-28 RX ADMIN — OXYCODONE HYDROCHLORIDE 15 MG: 10 TABLET ORAL at 09:03

## 2022-03-28 RX ADMIN — ENOXAPARIN SODIUM 40 MG: 100 INJECTION SUBCUTANEOUS at 04:03

## 2022-03-28 RX ADMIN — SODIUM CHLORIDE, SODIUM LACTATE, POTASSIUM CHLORIDE, AND CALCIUM CHLORIDE: .6; .31; .03; .02 INJECTION, SOLUTION INTRAVENOUS at 02:03

## 2022-03-28 RX ADMIN — FAMOTIDINE 20 MG: 20 TABLET ORAL at 08:03

## 2022-03-28 NOTE — SUBJECTIVE & OBJECTIVE
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)        Past Surgical History:   Procedure Laterality Date    ABDOMINAL SURGERY      CATARACT EXTRACTION Left 10/2017     SECTION      CHOLECYSTECTOMY      COLON SURGERY      cystoscope      CYSTOSCOPY W/ RETROGRADES Right 10/10/2019    Procedure: CYSTOSCOPY, WITH RETROGRADE PYELOGRAM;  Surgeon: Gen Isbell MD;  [facility];  Service: Urology;  Laterality: Right;    ERCP N/A 2021    Procedure: ERCP (ENDOSCOPIC RETROGRADE CHOLANGIOPANCREATOGRAPHY);  Surgeon: Jayjay Rivera MD;  [facility]  Service: Endoscopy;  Laterality: N/A;    ERCP N/A 3/4/2022    Procedure: ERCP (ENDOSCOPIC RETROGRADE CHOLANGIOPANCREATOGRAPHY);  Surgeon: Roby Virgen MD;  Location: [facility] ([address] [place] [address]);  Service: Endoscopy;  Laterality: N/A;    EYE SURGERY      HYSTERECTOMY  1996    LITHOTRIPSY      LIVER BIOPSY      carcinoid    URETEROSCOPY Right 10/10/2019    Procedure: URETEROSCOPY;  Surgeon: Gen Isbell MD;  [facility];  Service: Urology;  Laterality: Right;    UTERINE FIBROID SURGERY         Review of patient's allergies indicates:   Allergen Reactions    Contrast media Hives, Itching and Swelling    Epinephrine Anaphylaxis     Can cause  a Carcinoid Crisis    Ibuprofen Hives, Itching and Swelling    Zofran [ondansetron hcl] Itching     And multiple other reactions    Iodinated contrast media     Sulfa (sulfonamide antibiotics) Hives, Itching and Swelling       Medications:  Medications Prior to Admission   Medication Sig    alcohol swabs (BD ALCOHOL SWABS) PadM Apply 1 each topically as needed.     amLODIPine (NORVASC) 2.5 MG tablet Take 1 tablet (2.5 mg total) by mouth once daily.    atorvastatin (LIPITOR) 40 MG tablet Take 1 tablet (40 mg total) by mouth every evening.    BLOOD PRESSURE CUFF Misc 1 each by Misc.(Non-Drug; Combo Route) route once daily.    cyanocobalamin (VITAMIN B-12) 1000 MCG tablet Take 1 tablet (1,000 mcg total) by mouth once daily.    losartan (COZAAR) 100 MG tablet Take 1 tablet (100 mg total) by mouth once daily.    magnesium oxide (MAG-OX) 400 mg (241.3 mg magnesium) tablet Take 1 tablet (400 mg total) by mouth 2 (two) times daily.    meclizine (ANTIVERT) 25 mg tablet TAKE 1 TABLET(25 MG) BY MOUTH THREE TIMES DAILY AS NEEDED FOR DIZZINESS (Patient taking differently: Take 25 mg by mouth 3 (three) times daily as needed for Dizziness.)    metoprolol tartrate (LOPRESSOR) 25 MG tablet TAKE 1 TABLET TWICE DAILY (Patient taking differently: Take 25 mg by mouth 2 (two) times daily.)    oxyCODONE (ROXICODONE) 15 MG Tab Take 15 mg by mouth every 4 (four) hours as needed (chronic abdominal pain, malignancy related).     Antibiotics (From admission, onward)                Start     Stop Route Frequency Ordered    03/29/22 0900  ertapenem (INVANZ) 1 g in sodium chloride 0.9% 100 mL IVPB         -- IV Every 24 hours (non-standard times) 03/28/22 1545          Antifungals (From admission, onward)                None          Antivirals (From admission, onward)      None             Immunization History   Administered Date(s) Administered    COVID-19, MRNA, LN-S, PF (Pfizer) (Purple Cap) 08/14/2021    Influenza 11/06/2012    Influenza - High Dose - PF (65 years and older) 10/12/2017, 10/12/2018, 03/02/2020    Influenza - Trivalent (ADULT) 11/06/2012    Pneumococcal Conjugate - 13 Valent 10/12/2017    Pneumococcal Polysaccharide - 23 Valent 03/02/2020       Family History       Problem Relation (Age of Onset)    Alzheimer's disease Father    Cancer Mother    No Known Problems Son, Son,  Son, Son    Stroke Sister          Social History     Socioeconomic History    Marital status:    Occupational History    Occupation: disabled    Tobacco Use    Smoking status: Never Smoker    Smokeless tobacco: Never Used   Substance and Sexual Activity    Alcohol use: No     Alcohol/week: 0.0 standard drinks    Drug use: No    Sexual activity: Not Currently   Social History Narrative    Pt lives with son     Social Determinants of Health     Financial Resource Strain: Low Risk     Difficulty of Paying Living Expenses: Not very hard   Food Insecurity: No Food Insecurity    Worried About Running Out of Food in the Last Year: Never true    Ran Out of Food in the Last Year: Never true   Transportation Needs: No Transportation Needs    Lack of Transportation (Medical): No    Lack of Transportation (Non-Medical): No   Physical Activity: Inactive    Days of Exercise per Week: 0 days    Minutes of Exercise per Session: 0 min   Social Connections: Unknown    Marital Status:    Housing Stability: Low Risk     Unable to Pay for Housing in the Last Year: No    Number of Places Lived in the Last Year: 1    Unstable Housing in the Last Year: No     Review of Systems   Constitutional:  Positive for appetite change and chills. Negative for diaphoresis and fever.   Respiratory:  Negative for cough and shortness of breath.    Cardiovascular:  Negative for chest pain.   Gastrointestinal:  Positive for abdominal pain (right sided), diarrhea and nausea. Negative for abdominal distention, constipation and vomiting.   Genitourinary:  Positive for dysuria and frequency. Negative for difficulty urinating and flank pain.   Musculoskeletal:  Negative for arthralgias and gait problem.   Skin:  Negative for color change, pallor and rash.   Neurological:  Negative for speech difficulty and headaches.   Psychiatric/Behavioral:  Negative for agitation and confusion. The patient is not  nervous/anxious.    Objective:     Vital Signs (Most Recent):  Temp: 97.9 °F (36.6 °C) (03/28/22 1523)  Pulse: 65 (03/28/22 1523)  Resp: 18 (03/28/22 1523)  BP: (!) 162/70 (03/28/22 1523)  SpO2: (!) 93 % (03/28/22 1523)   Vital Signs (24h Range):  Temp:  [96.5 °F (35.8 °C)-98.7 °F (37.1 °C)] 97.9 °F (36.6 °C)  Pulse:  [56-78] 65  Resp:  [16-18] 18  SpO2:  [91 %-99 %] 93 %  BP: (153-191)/(70-81) 162/70     Weight: 78.4 kg (172 lb 13.5 oz)  Body mass index is 27.9 kg/m².    Estimated Creatinine Clearance: 70.1 mL/min (based on SCr of 0.8 mg/dL).    Physical Exam  Constitutional:       General: She is not in acute distress.     Appearance: She is not toxic-appearing or diaphoretic.   HENT:      Head: Normocephalic and atraumatic.      Nose: Nose normal. No congestion.   Eyes:      General: No scleral icterus.     Conjunctiva/sclera: Conjunctivae normal.   Cardiovascular:      Rate and Rhythm: Normal rate and regular rhythm.   Pulmonary:      Effort: Pulmonary effort is normal. No respiratory distress.   Abdominal:      General: There is no distension.      Palpations: Abdomen is soft.      Tenderness: There is abdominal tenderness (RUQ). There is no right CVA tenderness or left CVA tenderness.   Genitourinary:     Comments: No suprapubic tenderness  Musculoskeletal:         General: No tenderness or signs of injury.      Cervical back: Neck supple.   Lymphadenopathy:      Cervical: No cervical adenopathy.   Skin:     General: Skin is warm and dry.      Findings: No rash.   Neurological:      Mental Status: She is alert and oriented to person, place, and time.   Psychiatric:         Mood and Affect: Mood normal.         Behavior: Behavior normal.         Thought Content: Thought content normal.       Significant Labs: Blood Culture:   Recent Labs   Lab 11/04/21  1941 11/04/21 2001 11/20/21  1206 11/20/21  1333 03/05/22  1820   LABBLOO No growth after 5 days. No growth after 5 days. No growth after 5 days. No growth  after 5 days. No growth after 5 days.  No growth after 5 days.     CBC:   Recent Labs   Lab 03/27/22  1516 03/28/22  0554   WBC 4.18 4.29   HGB 10.0* 9.6*   HCT 32.9* 30.1*    127*     CMP:   Recent Labs   Lab 03/27/22  1516 03/28/22  0553    140   K 3.6 3.4*    108   CO2 24 23    86   BUN 16 14   CREATININE 0.9 0.8   CALCIUM 9.6 9.2   PROT 7.5  --    ALBUMIN 3.3*  --    BILITOT 0.5  --    ALKPHOS 142*  --    AST 16  --    ALT 11  --    ANIONGAP 10 9   EGFRNONAA >60.0 >60.0     Lactic Acid:   Recent Labs   Lab 03/28/22  0032   LACTATE 0.9     Microbiology Results (last 7 days)       Procedure Component Value Units Date/Time    Urine culture [[id number]]  (Abnormal) Collected: 03/27/22 1714    Order Status: Completed Specimen: Urine Updated: 03/28/22 1514     Urine Culture, Routine GRAM NEGATIVE JADEN  >100,000 cfu/ml  Identification and susceptibility pending      Narrative:      Specimen Source->Urine    Clostridium difficile EIA [[id number]] Collected: 03/28/22 1030    Order Status: Sent Specimen: Stool Updated: 03/28/22 1120          Pathology Results  (Last 10 years)      None          Procalcitonin: No results for input(s): PROCAL in the last 48 hours.  Respiratory Culture: No results for input(s): GSRESP, RESPIRATORYC in the last 4320 hours.  Urine Culture:   Recent Labs   Lab 02/02/22  0915 02/15/22  0920 03/01/22  1116 03/14/22  1452 03/27/22  1714   LABURIN KLEBSIELLA PNEUMONIAE ESBL  >100,000 cfu/ml  * Multiple organisms isolated. None in predominance.  Repeat if  clinically necessary. HAFNIA ALVEI  >100,000 cfu/ml  * Multiple organisms isolated. None in predominance.  Repeat if  clinically necessary. GRAM NEGATIVE JADEN  >100,000 cfu/ml  Identification and susceptibility pending  *     Urine Studies:   Recent Labs   Lab 02/15/22  0920 02/23/22  0919 03/18/22  0950 03/27/22  1714   COLORU  --    < > Yellow Rosalva   APPEARANCEUA  --    < >  --  Hazy*   PHUR  --    < > 5 5.0   SPECGRAV   --    < > 1.025 1.015   PROTEINUA  --    < >  --  Negative   GLUCUA  --    < >  --  Negative   KETONESU  --    < > neg Negative   BILIRUBINUA  --    < >  --  Negative   OCCULTUA  --    < >  --  1+*   NITRITE  --    < > neg Positive*   UROBILINOGEN  --    < > noirmal  --    LEUKOCYTESUR  --    < >  --  Trace*   RBCUA 7*   < >  --  5*   WBCUA 8*   < >  --  25*   BACTERIA Few*   < >  --  Many*   SQUAMEPITHEL 4   < >  --  2   HYALINECASTS 0  --   --   --     < > = values in this interval not displayed.     Wound Culture: No results for input(s): LABAERO in the last 4320 hours.  Recent Lab Results  (Last 5 results in the past 24 hours)        03/28/22  0554   03/28/22  0553   03/28/22  0032   03/27/22  2223   03/27/22  1714        Anion Gap   9             Appearance, UA         Hazy       Bacteria, UA         Many       Baso # 0.03               Basophil % 0.7               Bilirubin (UA)         Negative       BUN   14             Calcium   9.2             Chloride   108             CO2   23             Color, UA         Rosalva       Creatinine   0.8             Differential Method Automated               eGFR if    >60.0             eGFR if non    >60.0  Comment: Calculation used to obtain the estimated glomerular filtration  rate (eGFR) is the CKD-EPI equation.                Eos # 0.1               Eosinophil % 1.9               Estimated Avg Glucose   143             Glucose   86             Glucose, UA         Negative       Gran # (ANC) 2.3               Gran % 52.4               Hematocrit 30.1               Hemoglobin 9.6               Hemoglobin A1C External   6.6  Comment: ADA Screening Guidelines:  5.7-6.4%  Consistent with prediabetes  >or=6.5%  Consistent with diabetes    High levels of fetal hemoglobin interfere with the HbA1C  assay. Heterozygous hemoglobin variants (HbS, HgC, etc)do  not significantly interfere with this assay.   However, presence of multiple variants may  affect accuracy.               Immature Grans (Abs) 0.05  Comment: Mild elevation in immature granulocytes is non specific and   can be seen in a variety of conditions including stress response,   acute inflammation, trauma and pregnancy. Correlation with other   laboratory and clinical findings is essential.                 Immature Granulocytes 1.2               Ketones, UA         Negative       Lactate, Gee     0.9  Comment: Falsely low lactic acid results can be found in samples   containing >=13.0 mg/dL total bilirubin and/or >=3.5 mg/dL   direct bilirubin.             Leukocytes, UA         Trace       Lymph # 1.4               Lymph % 32.6               MCH 26.5               MCHC 31.9               MCV 83               Microscopic Comment         SEE COMMENT  Comment: Other formed elements not mentioned in the report are not   present in the microscopic examination.          Mono # 0.5               Mono % 11.2               MPV 10.7               NITRITE UA         Positive       nRBC 0               Occult Blood UA         1+       pH, UA         5.0       Platelets 127               Potassium   3.4             Protein, UA         Negative  Comment: Recommend a 24 hour urine protein or a urine   protein/creatinine ratio if globulin induced proteinuria is  clinically suspected.          Acceptable       Yes         RBC 3.62               RBC, UA         5       RDW 13.7               SARS-CoV-2 RNA, Amplification, Qual       Negative         Sodium   140             Specific [place], UA         1.015       Specimen UA         Urine, Clean Catch       Squam Epithel, UA         2       Urine Culture, Routine         GRAM NEGATIVE JADEN  >100,000 cfu/ml  Identification and susceptibility pending    [P]       WBC, UA         25       WBC 4.29                                       [P] - Preliminary Result               Significant Imaging:     US Abdomen Limited [[id number]] Resulted: 03/27/22 9274    Order Status: Completed Updated: 03/27/22 1830   Narrative:     EXAMINATION:   US ABDOMEN LIMITED     CLINICAL HISTORY:   RUQ, s/p leslie, gets bile duct stones;.     TECHNIQUE:   Limited ultrasound of the right upper quadrant of the abdomen including pancreas, liver, gallbladder, common bile duct was performed.     COMPARISON:   CT abdomen pelvis 03/27/2022, MRI abdomen 03/02/2022     FINDINGS:   Liver: Normal in size, measuring 14.3 cm with homogeneous echotexture.  1.9 x 2.0 x 1.5 heterogeneous echogenic lesion in the right hepatic lobe in keeping with known hepatic metastatic disease and better characterized on above comparison MRI abdomen.     Gallbladder: The gallbladder is surgically absent.     Biliary system: The common duct is not dilated, measuring 6 mm.  Mild central intrahepatic biliary dilatation.     IVC: Visualized portions appear within normal limits.     Spleen: Normal in size with a homogeneous echotexture, measuring 7.5 x 3.1 cm.     Pancreas: The visualized portions of pancreas appear normal.     Miscellaneous: No ascites.    Impression:       Mildly dilated central intrahepatic bile ducts.  Common bile duct is normal caliber status post cholecystectomy.  No choledocholithiasis.     Right hepatic lobe lesion in keeping with known hepatic metastatic disease, better characterized on above comparison MRI.     Electronically signed by resident: Lon Hanson MD   Date: 03/27/2022   Time: 18:12     Electronically signed by: Gen Ray MD   Date: 03/27/2022   Time: 18:27   CT Abdomen Pelvis Without Contrast [[id number]] Resulted: 03/27/22 1556   Order Status: Completed Updated: 03/27/22 1559   Narrative:     EXAMINATION:   CT ABDOMEN PELVIS WITHOUT CONTRAST     CLINICAL HISTORY:   Abdominal pain, post-op;     TECHNIQUE:   Low dose axial images, sagittal and coronal reformations were obtained from the lung bases to the pubic symphysis.  Oral contrast was not administered.     COMPARISON:    03/11/2022     FINDINGS:   The lung bases are clear.     No pericardial effusion.     The noncontrast appearance of the liver demonstrates areas of parenchymal calcifications in the left hepatic lobe, unchanged.     The biliary ducts do not appear dilated.  The common bile duct measures about 6 mm.     No radiopaque stones seen within the common bile duct.     Cholecystectomy clips seen.     The stomach appears normal.  The pancreas appears normal.     The spleen, bilateral adrenal glands appear normal.     There are bilateral sizable forming kidney calculi.  There is no hydronephrosis or hydroureter, no stones seen within the bilateral ureters or within the bladder.     The abdominal aorta tapers normally, no para-aortic lymphadenopathy.     Mild stool retention.  Prior partial colectomy.  No bowel dilation.  No inflammatory changes of the bowel seen.     5.0 x 2.5 cm partially calcified soft tissue mass in the right mid anterior abdomen region, not significantly changed.     Note that the patency of the mesenteric vasculature cannot be assessed without the use of IV contrast.     The bladder is nondistended.  The uterus is removed.  The left ovary is not seen, the right ovary appears normal.     No free fluid, no free air.     The inguinal regions appear normal.     The osseous structures demonstrate degenerative changes at the bilateral sacroiliac joints.  No osseous lesions.    Impression:       No definite abnormality seen to explain patient's severe pain after ERCP.     Cholecystectomy.     Bilateral numerous nonobstructive kidney calculi     Prior partial colectomy.     Unchanged soft tissue mass within the right lower abdominal quadrant.     Note that the patency of the mesenteric vasculature cannot be assessed on noncontrast images.       Electronically signed by: Renetta Snow MD   Date: 03/27/2022   Time: 15:56

## 2022-03-28 NOTE — SUBJECTIVE & OBJECTIVE
Interval History: Patient seen this AM,s till with lower abd pain and discomfort after eating. Reporting dysuria and suprapubic pain as well. Stool studies pending. Continue IV meropenem, await ID recs    Review of Systems   Constitutional:  Positive for appetite change. Negative for chills and fever.   Respiratory:  Negative for chest tightness and shortness of breath.    Cardiovascular:  Negative for chest pain.   Gastrointestinal:  Positive for abdominal pain, diarrhea and nausea. Negative for abdominal distention and vomiting.   Genitourinary:  Positive for dysuria and frequency. Negative for difficulty urinating and hematuria.   Musculoskeletal:  Negative for arthralgias and gait problem.   Skin:  Negative for color change.   Neurological:  Negative for speech difficulty and light-headedness.   Psychiatric/Behavioral:  Negative for agitation and confusion.    Objective:     Vital Signs (Most Recent):  Temp: 98.5 °F (36.9 °C) (03/28/22 1117)  Pulse: (!) 56 (03/28/22 1117)  Resp: 18 (03/28/22 1133)  BP: (!) 157/70 (03/28/22 1117)  SpO2: (!) 92 % (03/28/22 1117)   Vital Signs (24h Range):  Temp:  [96.5 °F (35.8 °C)-98.7 °F (37.1 °C)] 98.5 °F (36.9 °C)  Pulse:  [56-78] 56  Resp:  [16-20] 18  SpO2:  [91 %-99 %] 92 %  BP: (148-191)/(65-81) 157/70     Weight: 78.4 kg (172 lb 13.5 oz)  Body mass index is 27.9 kg/m².    Intake/Output Summary (Last 24 hours) at 3/28/2022 1213  Last data filed at 3/28/2022 1100  Gross per 24 hour   Intake 1050 ml   Output 200 ml   Net 850 ml      Physical Exam  Vitals and nursing note reviewed.   Constitutional:       General: She is not in acute distress.     Appearance: She is well-developed. She is obese.   HENT:      Head: Normocephalic and atraumatic.   Eyes:      Extraocular Movements: Extraocular movements intact.   Cardiovascular:      Rate and Rhythm: Normal rate and regular rhythm.      Heart sounds: Normal heart sounds.   Pulmonary:      Effort: Pulmonary effort is normal. No  respiratory distress.   Abdominal:      General: Abdomen is flat. Bowel sounds are normal. There is no distension.      Palpations: Abdomen is soft.      Tenderness: There is abdominal tenderness (RLQ).   Musculoskeletal:         General: Normal range of motion.      Right lower leg: No edema.      Left lower leg: No edema.   Skin:     General: Skin is warm and dry.   Neurological:      General: No focal deficit present.      Mental Status: She is alert.   Psychiatric:         Mood and Affect: Mood normal.         Behavior: Behavior normal.         Thought Content: Thought content normal.       Significant Labs: All pertinent labs within the past 24 hours have been reviewed.    Significant Imaging: I have reviewed all pertinent imaging results/findings within the past 24 hours.

## 2022-03-28 NOTE — ASSESSMENT & PLAN NOTE
71 year old female with history of HTN, HFpEF, carcinoid tumor of abdomen, chronic bilateral renal stones,  recurrent UTIs followed by urology, prior cholecystectomy, choledocholithiasis with recent ERCP/biliary sphincterotomy on 3/4 who presents to [facility] [place] with complaints of abdominal pain, diarrhea and dysuria.     Afebrile and VSS. No leukocytosis. UA positive for pyuria. RUQ US and CT abd/pelvis without acute abnormalities. No choledocholithiasis. No hydronephrosis. Non obstucting renal stones present. Patient started on meropenem given hx of MDR UTIs.  ID consulted for antibiotic recommendations.     Recommendations  · Deescalate meropenem to ertapenem  · Follow urine culture to guide antibiotics  · Send stool for C. Diff, fecal leukocytes given recent hospitalization and antibiotic exposures   · If C. Diff positive, start oral vancomycin  · Discussed plan with ID staff. ID will follow.

## 2022-03-28 NOTE — ASSESSMENT & PLAN NOTE
- suspect largely due to abdominal carcinoid tumor and UTI contributing, though some c/f c.diff given multiple episodes of diffuse diarrhea   - LFTs and lipase WNL  - RUQ US and CT abd/pelvis without etiology of abd pain  - c.dif pending  - continue home pain meds  - PRN anti-emetics   - add famotidine and reglan

## 2022-03-28 NOTE — ASSESSMENT & PLAN NOTE
History of ESBL E. coli infection  Multiple drug resistant organism (MDRO) culture positive  - hx recurrent MDRO UTIs including ESBL   - afebrile without leukocytosis  - UA infectious, + dysuria  - given IV meropenem in the ED based on prior UCx sensitivities, will continue  - ID consulted for further antibiotic clearance  - follow UCx  - gentle IVFs overnight

## 2022-03-28 NOTE — HPI
Patito Domingo is a 71 y.o. female with a PMHx of HTN, HLD, HFpEF, carcinoid tumor of midgut, s/p CCY with recent ERCP 2/2 retained stones who [place] [facility] [place] complaints of abdominal pain. Patient reports intermittent RLQ abdominal pain with associated nausea, dry heaving, and decreased PO intake since her recent ERCP 3 weeks ago. She has difficulty describing characteristics of abdominal pain but says she thinks pain worsens after eating. She also reports several episodes of diffuse watery diarrhea for the last few days, says she had 6 episodes prior to arrival to the ED today. She took imodium 2 days ago without improvement. She denies dark/bloody stools but says her stool appears grey colored. Patient also complaints of urinary frequency, dysuria, and feeling hot/cold recently. Denies fever, chills, diaphoresis, vomiting, fever, cough, SOB, chest pain, dizziness, HA, or syncope.     ED: AFVSS. No leukocytosis. RUQ US and CT abd/pelvis without acute abnormalities. UA consistent with infection. Most recent Ucx grew Klebsiella and Hafnia resistant to multiple organisms. Given IV meropenem based on recent Ucx sensitivities.

## 2022-03-28 NOTE — H&P
[facility] Medicine  History & Physical    Patient Name: Patito Domingo  MRN: [id number]  Patient Class: OP- Observation  Admission Date: 3/27/2022  Attending Physician: Irma Metcalf MD   Primary Care Provider: Yasir Myers Jr, MD         Patient information was obtained from patient, past medical records and ER records.     Subjective:     Principal Problem:Urinary tract infection without hematuria    Chief Complaint:   Chief Complaint   Patient presents with    Abdominal Pain     Had gallstone removed 3 weeks ago. Reporting pain since procedure. Pt. States she has been unable to eat.         HPI: Patito Domingo is a 71 y.o. female with a PMHx of HTN, HLD, HFpEF, carcinoid tumor of midgut, s/p CCY with recent ERCP 2/2 retained stones who presents to [facility] with complaints of abdominal pain. Patient reports intermittent RLQ abdominal pain with associated nausea, dry heaving, and decreased PO intake since her recent ERCP 3 weeks ago. She has difficulty describing characteristics of abdominal pain but says she thinks pain worsens after eating. She also reports several episodes of diffuse watery diarrhea for the last few days, says she had 6 episodes prior to arrival to the ED today. She took imodium 2 days ago without improvement. She denies dark/bloody stools but says her stool appears grey colored. Patient also complaints of urinary frequency, dysuria, and feeling hot/cold recently. Denies fever, chills, diaphoresis, vomiting, fever, cough, SOB, chest pain, dizziness, HA, or syncope.     ED: AFVSS. No leukocytosis. RUQ US and CT abd/pelvis without acute abnormalities. UA consistent with infection. Most recent Ucx grew Klebsiella and Hafnia resistant to multiple organisms. Given IV meropenem based on recent Ucx sensitivities.       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)        Past Surgical History:   Procedure Laterality Date    ABDOMINAL SURGERY      CATARACT EXTRACTION Left 10/2017     SECTION      CHOLECYSTECTOMY      COLON SURGERY      cystoscope      CYSTOSCOPY W/ RETROGRADES Right 10/10/2019    Procedure: CYSTOSCOPY, WITH RETROGRADE PYELOGRAM;  Surgeon: Gen Isbell MD;  Location: [facility] OR;  Service: Urology;  Laterality: Right;    ERCP N/A 2021    Procedure: ERCP (ENDOSCOPIC RETROGRADE CHOLANGIOPANCREATOGRAPHY);  Surgeon: Jayjay Rivera MD;  Location: [facility] ENDO (2ND FLR);  Service: Endoscopy;  Laterality: N/A;    ERCP N/A 3/4/2022    Procedure: ERCP (ENDOSCOPIC RETROGRADE CHOLANGIOPANCREATOGRAPHY);  Surgeon: Roby Virgen MD;  [facility]  Service: Endoscopy;  Laterality: N/A;    EYE SURGERY      HYSTERECTOMY  1996    LITHOTRIPSY      LIVER BIOPSY      carcinoid    URETEROSCOPY Right 10/10/2019    Procedure: URETEROSCOPY;  Surgeon: Gen Isbell MD;  Location: [facility] OR;  Service: Urology;  Laterality: Right;    UTERINE FIBROID SURGERY         Review of patient's allergies indicates:   Allergen Reactions    Contrast media Hives, Itching and Swelling    Epinephrine Anaphylaxis     Can cause  a Carcinoid Crisis    Ibuprofen Hives, Itching and Swelling    Zofran [ondansetron hcl] Itching     And multiple other reactions    Iodinated contrast media     Sulfa (sulfonamide antibiotics) Hives, Itching and Swelling       No current facility-administered medications on file prior to encounter.     Current Outpatient Medications on File Prior to Encounter   Medication Sig    alcohol swabs (BD ALCOHOL SWABS) PadM Apply 1 each topically as needed.    amLODIPine (NORVASC) 2.5 MG tablet Take 1 tablet (2.5 mg  total) by mouth once daily.    atorvastatin (LIPITOR) 40 MG tablet Take 1 tablet (40 mg total) by mouth every evening.    BLOOD PRESSURE CUFF Misc 1 each by Misc.(Non-Drug; Combo Route) route once daily.    cyanocobalamin (VITAMIN B-12) 1000 MCG tablet Take 1 tablet (1,000 mcg total) by mouth once daily.    losartan (COZAAR) 100 MG tablet Take 1 tablet (100 mg total) by mouth once daily.    magnesium oxide (MAG-OX) 400 mg (241.3 mg magnesium) tablet Take 1 tablet (400 mg total) by mouth 2 (two) times daily.    meclizine (ANTIVERT) 25 mg tablet TAKE 1 TABLET(25 MG) BY MOUTH THREE TIMES DAILY AS NEEDED FOR DIZZINESS (Patient taking differently: Take 25 mg by mouth 3 (three) times daily as needed for Dizziness.)    metoprolol tartrate (LOPRESSOR) 25 MG tablet TAKE 1 TABLET TWICE DAILY (Patient taking differently: Take 25 mg by mouth 2 (two) times daily.)    oxyCODONE (ROXICODONE) 15 MG Tab Take 15 mg by mouth every 4 (four) hours as needed (chronic abdominal pain, malignancy related).     Family History       Problem Relation (Age of Onset)    Alzheimer's disease Father    Cancer Mother    No Known Problems Son, Son, Son, Son    Stroke Sister          Tobacco Use    Smoking status: Never Smoker    Smokeless tobacco: Never Used   Substance and Sexual Activity    Alcohol use: No     Alcohol/week: 0.0 standard drinks    Drug use: No    Sexual activity: Not Currently     Review of Systems   Constitutional:  Positive for appetite change. Negative for chills and fever.   HENT:  Negative for congestion, trouble swallowing and voice change.    Eyes:  Negative for photophobia and visual disturbance.   Respiratory:  Negative for cough, chest tightness, shortness of breath and wheezing.    Cardiovascular:  Negative for chest pain, palpitations and leg swelling.   Gastrointestinal:  Positive for abdominal pain, diarrhea and nausea. Negative for abdominal distention and vomiting.   Endocrine: Negative for polyphagia  and polyuria.   Genitourinary:  Positive for dysuria and frequency. Negative for decreased urine volume, difficulty urinating and hematuria.   Musculoskeletal:  Negative for arthralgias, back pain and gait problem.   Skin:  Negative for color change.   Neurological:  Negative for dizziness, syncope, speech difficulty, weakness, light-headedness, numbness and headaches.   Psychiatric/Behavioral:  Negative for agitation, behavioral problems and confusion.    Objective:     Vital Signs (Most Recent):  Temp: 98.5 °F (36.9 °C) (03/28/22 0005)  Pulse: 70 (03/28/22 0005)  Resp: 18 (03/28/22 0126)  BP: (!) 186/81 (03/28/22 0005)  SpO2: 96 % (03/28/22 0005)   Vital Signs (24h Range):  Temp:  [98.5 °F (36.9 °C)-98.7 °F (37.1 °C)] 98.5 °F (36.9 °C)  Pulse:  [68-78] 70  Resp:  [16-20] 18  SpO2:  [96 %-99 %] 96 %  BP: (148-189)/(65-81) 186/81     Weight: 78.5 kg (173 lb)  Body mass index is 27.92 kg/m².    Physical Exam  Vitals and nursing note reviewed.   Constitutional:       General: She is not in acute distress.     Appearance: She is well-developed. She is obese.   HENT:      Head: Normocephalic and atraumatic.      Mouth/Throat:      Pharynx: No oropharyngeal exudate.   Eyes:      Extraocular Movements: Extraocular movements intact.      Conjunctiva/sclera: Conjunctivae normal.   Cardiovascular:      Rate and Rhythm: Normal rate and regular rhythm.      Heart sounds: Normal heart sounds.   Pulmonary:      Effort: Pulmonary effort is normal. No respiratory distress.      Breath sounds: Normal breath sounds. No wheezing.   Abdominal:      General: Bowel sounds are normal. There is no distension.      Palpations: Abdomen is soft.      Tenderness: There is abdominal tenderness (RLQ). There is no guarding or rebound.   Musculoskeletal:         General: No tenderness. Normal range of motion.      Cervical back: Normal range of motion and neck supple.   Lymphadenopathy:      Cervical: No cervical adenopathy.   Skin:     General:  Skin is warm and dry.      Capillary Refill: Capillary refill takes less than 2 seconds.      Findings: No rash.   Neurological:      Mental Status: She is alert and oriented to person, place, and time.      Cranial Nerves: No cranial nerve deficit.      Sensory: No sensory deficit.      Coordination: Coordination normal.   Psychiatric:         Behavior: Behavior normal.         Thought Content: Thought content normal.         Judgment: Judgment normal.           Significant Labs: All pertinent labs within the past 24 hours have been reviewed.  CBC:   Recent Labs   Lab 03/27/22  1516   WBC 4.18   HGB 10.0*   HCT 32.9*        CMP:   Recent Labs   Lab 03/27/22  1516      K 3.6      CO2 24      BUN 16   CREATININE 0.9   CALCIUM 9.6   PROT 7.5   ALBUMIN 3.3*   BILITOT 0.5   ALKPHOS 142*   AST 16   ALT 11   ANIONGAP 10   EGFRNONAA >60.0       Significant Imaging: I have reviewed all pertinent imaging results/findings within the past 24 hours.  US Abdomen Limited  Narrative: EXAMINATION:  US ABDOMEN LIMITED    CLINICAL HISTORY:  RUQ, s/p leslie, gets bile duct stones;.    TECHNIQUE:  Limited ultrasound of the right upper quadrant of the abdomen including pancreas, liver, gallbladder, common bile duct was performed.    COMPARISON:  CT abdomen pelvis 03/27/2022, MRI abdomen 03/02/2022    FINDINGS:  Liver: Normal in size, measuring 14.3 cm with homogeneous echotexture.  1.9 x 2.0 x 1.5 heterogeneous echogenic lesion in the right hepatic lobe in keeping with known hepatic metastatic disease and better characterized on above comparison MRI abdomen.    Gallbladder: The gallbladder is surgically absent.    Biliary system: The common duct is not dilated, measuring 6 mm.  Mild central intrahepatic biliary dilatation.    IVC: Visualized portions appear within normal limits.    Spleen: Normal in size with a homogeneous echotexture, measuring 7.5 x 3.1 cm.    Pancreas: The visualized portions of pancreas  appear normal.    Miscellaneous: No ascites.  Impression: Mildly dilated central intrahepatic bile ducts.  Common bile duct is normal caliber status post cholecystectomy.  No choledocholithiasis.    Right hepatic lobe lesion in keeping with known hepatic metastatic disease, better characterized on above comparison MRI.    Electronically signed by resident: Lon Hanson MD  Date:    03/27/2022  Time:    18:12    Electronically signed by: Gen Ray MD  Date:    03/27/2022  Time:    18:27  CT Abdomen Pelvis  Without Contrast  Narrative: EXAMINATION:  CT ABDOMEN PELVIS WITHOUT CONTRAST    CLINICAL HISTORY:  Abdominal pain, post-op;    TECHNIQUE:  Low dose axial images, sagittal and coronal reformations were obtained from the lung bases to the pubic symphysis.  Oral contrast was not administered.    COMPARISON:  03/11/2022    FINDINGS:  The lung bases are clear.    No pericardial effusion.    The noncontrast appearance of the liver demonstrates areas of parenchymal calcifications in the left hepatic lobe, unchanged.    The biliary ducts do not appear dilated.  The common bile duct measures about 6 mm.    No radiopaque stones seen within the common bile duct.    Cholecystectomy clips seen.    The stomach appears normal.  The pancreas appears normal.    The spleen, bilateral adrenal glands appear normal.    There are bilateral sizable forming kidney calculi.  There is no hydronephrosis or hydroureter, no stones seen within the bilateral ureters or within the bladder.    The abdominal aorta tapers normally, no para-aortic lymphadenopathy.    Mild stool retention.  Prior partial colectomy.  No bowel dilation.  No inflammatory changes of the bowel seen.    5.0 x 2.5 cm partially calcified soft tissue mass in the right mid anterior abdomen region, not significantly changed.    Note that the patency of the mesenteric vasculature cannot be assessed without the use of IV contrast.    The bladder is nondistended.  The uterus  is removed.  The left ovary is not seen, the right ovary appears normal.    No free fluid, no free air.    The inguinal regions appear normal.    The osseous structures demonstrate degenerative changes at the bilateral sacroiliac joints.  No osseous lesions.  Impression: No definite abnormality seen to explain patient's severe pain after ERCP.    Cholecystectomy.    Bilateral numerous nonobstructive kidney calculi    Prior partial colectomy.    Unchanged soft tissue mass within the right lower abdominal quadrant.    Note that the patency of the mesenteric vasculature cannot be assessed on noncontrast images.    Electronically signed by: Renetta Snow MD  Date:    03/27/2022  Time:    15:56      Assessment/Plan:     * Urinary tract infection without hematuria  History of ESBL E. coli infection  Multiple drug resistant organism (MDRO) culture positive  - hx recurrent MDRO UTIs including ESBL   - afebrile without leukocytosis  - UA infectious, + dysuria  - given IV meropenem in the ED based on prior UCx sensitivities, will continue  - ID consulted for further antibiotic clearance  - follow UCx  - gentle IVFs overnight    Generalized abdominal pain  - suspect largely due to abdominal carcinoid tumor and UTI contributing, though some c/f c.diff given multiple episodes of diffuse diarrhea   - LFTs and lipase WNL  - RUQ US and CT abd/pelvis without etiology of abd pain  - c.dif pending  - continue home pain meds  - PRN anti-emetics   - add famotidine and reglan     Malignant carcinoid tumor of midgut  - likely cause/ contributing to abdominal pain  - follows with heme/onc as outpatient  - continue home PRN oxycodone    Chronic diastolic heart failure with preserved ejection fraction  - appears dry vs euvolemic  - monitor volume status s/p IVFs  - continue ARB, BB  - strict I/Os    Vertigo  - continue PRN meclizine    HLD (hyperlipidemia)  - continue statin     Essential hypertension  - continue amlodipine 2.5mg  daily, losartan 100mg daily and MTP 25mg BID      VTE Risk Mitigation (From admission, onward)         Ordered     enoxaparin injection 40 mg  Daily         03/27/22 2250     IP VTE HIGH RISK PATIENT  Once         03/27/22 2250     Place sequential compression device  Until discontinued         03/27/22 2240                   Maribel Alvarez PA-C  Department of Hospital Medicine   Migel krystyna - Telemetry Stepdown

## 2022-03-28 NOTE — PLAN OF CARE
"Migel Traore - Telemetry Stepdown  Initial Discharge Assessment       Primary Care Provider: Yasir Myers Jr, MD    Admission Diagnosis: Chest pain [R07.9]  Urinary tract infection without hematuria, site unspecified [N39.0]    Admission Date: 3/27/2022  Expected Discharge Date: 3/29/2022    Discharge Barriers Identified: None    Payor: [employer] MANAGED MEDICARE / Plan: [employer] SNP (SPECIAL NEEDS PLAN) / Product Type: Medicare Advantage /     Extended Emergency Contact Information  Primary Emergency Contact: Matias Mcbride  Mobile Phone: [phone]  Relation: Son  Secondary Emergency Contact: Dallas Domingo   [facility]  Mobile Phone: [phone]  Relation: Son    Discharge Plan A: Home  Discharge Plan B: [id number] - [facility] [address] AT [facility] [address] [facility]  [address]  [facility] [address]  Phone: [phone] Fax: [phone]    [facility] [address] [facility]  [address] [facility] [address]  Phone: [phone] Fax: [phone]      Initial Assessment (most recent)     Adult Discharge Assessment - 03/28/22 1330        Discharge Assessment    Assessment Type Discharge Planning Assessment     Confirmed/corrected address, phone number and insurance Yes     Confirmed Demographics Correct on Facesheet     Source of Information patient     When was your last doctors appointment? --   "Last week"    Communicated JULIO C with patient/caregiver Yes     Reason For Admission UTI without hematuria     Lives With alone     Facility Arrived From: Home     Do you expect to return to your current living situation? Yes     Do you have help at home or someone to help you manage your care at home? Yes     Who are your caregiver(s) and their phone number(s)? Elmo Domingo [phone]     Prior to hospitilization cognitive status: Alert/Oriented     Current cognitive status: Alert/Oriented     " Walking or Climbing Stairs Difficulty ambulation difficulty, requires equipment     Mobility Management Rolling walker     Dressing/Bathing Difficulty none     Home Accessibility not wheelchair accessible     Home Layout Able to live on 1st floor     Equipment Currently Used at Home rollator;bedside commode;bath bench     Patient currently being followed by outpatient case management? No     Do you currently have service(s) that help you manage your care at home? No     Do you take prescription medications? Yes     Do you have prescription coverage? Yes     Coverage [employer] Managed Medicare     Do you have any problems affording any of your prescribed medications? No     Is the patient taking medications as prescribed? yes     Who is going to help you get home at discharge? Son Ryan Domingo [phone]     How do you get to doctors appointments? public transportation;family or friend will provide     Are you on dialysis? No     Do you take coumadin? No     Discharge Plan A Home     Discharge Plan B Home Health     DME Needed Upon Discharge  none     Discharge Plan discussed with: Patient     Discharge Barriers Identified None               Pt lives alone in an apartment up one flight of stairs. Pt is hopefully going to move into a ground floor apartment soon. Pt's sons check on her often. Pt uses a rollator, a bedside commode and a bath bench at home.     Pt has had [facility], but does NOT want to use them again. If HH is ordered, she wants to see a list.     SW will follow for discharge needs.     JORDAN Patel, LMSW [facility]  [id number]

## 2022-03-28 NOTE — CONSULTS
[facility] - Telemetry Stepdown  Infectious Disease  Consult Note    Patient Name: Patito Domingo  MRN: [id number]  Admission Date: 3/27/2022  Hospital Length of Stay: 0 days  Attending Physician: Irma Metcalf MD  Primary Care Provider: Yasir Myers Jr, MD     Isolation Status: Special Contact    Patient information was obtained from patient and past medical records.      Inpatient consult to Infectious Diseases  Consult performed by: Hien Dong PA-C  Consult ordered by: Maribel Alvarez PA-C        Assessment/Plan:     History of ESBL klebsiella infection     See A/P below    Diarrhea     See A/P below    Urinary tract infection without hematuria  71 year old female with history of HTN, HFpEF, carcinoid tumor of abdomen, chronic bilateral renal stones,  recurrent UTIs followed by urology, prior cholecystectomy, choledocholithiasis with recent ERCP/biliary sphincterotomy on 3/4 [place] [facility] [place] of [place], diarrhea and dysuria.     Afebrile and VSS. No leukocytosis. UA positive for pyuria. RUQ US and CT abd/pelvis without acute abnormalities. No choledocholithiasis. No hydronephrosis. Non obstucting renal stones present. Patient started on meropenem given hx of MDR UTIs.  ID consulted for antibiotic recommendations.     Recommendations  · Deescalate meropenem to ertapenem  · Follow urine culture to guide antibiotics  · Send stool for C. Diff, fecal leukocytes given recent hospitalization and antibiotic exposures   · If C. Diff positive, start oral vancomycin  · Discussed plan with ID staff. ID will follow.         Thank you for the consult. Please call for any questions.  Hien Dong PA-C  ID [id number] Spectra: [id number]    Subjective:     Principal Problem: Urinary tract infection without hematuria    HPI: Patito Domingo is a 71 y.o. 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    Patient reports RLQ abdominal pain with associated nausea, decreased PO intake since her recent ERCP. She also reports developing several episodes of diffuse watery diarrhea for which she has been seen in the ED a few times. In addition, she endorses urinary frequency, dysuria, and chills.     Afebrile and VSS. No leukocytosis. UA with positive nitrites, trace leuks, 25 WBC. RUQ US and CT abd/pelvis without acute abnormalities. No choledocholithiasis. No hydronephrosis. Non obstucting renal stones present. As most recent Ucx grew  ESBL Klebsiella and Hafnia, patient started on meropenem. Urine cx 3/27 is pending. C diff pending.  ID consulted for antibiotic recommendations. Patient lives alone and cares for herself.      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;  Service: Urology;  Laterality: Right;    ERCP N/A 2021    Procedure: ERCP (ENDOSCOPIC RETROGRADE CHOLANGIOPANCREATOGRAPHY);  Surgeon: Jayjay Rivera MD;  Location: [facility] ENDO [address]);  Service: Endoscopy;  Laterality: N/A;    ERCP N/A 3/4/2022    Procedure: ERCP (ENDOSCOPIC  RETROGRADE CHOLANGIOPANCREATOGRAPHY);  Surgeon: Roby Virgen MD;  Location: [facility] ENDO ([address] [place] [address]);  Service: Endoscopy;  Laterality: N/A;    EYE SURGERY      HYSTERECTOMY  5/1996    LITHOTRIPSY      LIVER BIOPSY  9/14    carcinoid    URETEROSCOPY Right 10/10/2019    Procedure: URETEROSCOPY;  Surgeon: Gen Isbell MD;  [facility];  Service: Urology;  Laterality: Right;    UTERINE FIBROID SURGERY         Review of patient's allergies indicates:   Allergen Reactions    Contrast media Hives, Itching and Swelling    Epinephrine Anaphylaxis     Can cause  a Carcinoid Crisis    Ibuprofen Hives, Itching and Swelling    Zofran [ondansetron hcl] Itching     And multiple other reactions    Iodinated contrast media     Sulfa (sulfonamide antibiotics) Hives, Itching and Swelling       Medications:  Medications Prior to Admission   Medication Sig    alcohol swabs (BD ALCOHOL SWABS) PadM Apply 1 each topically as needed.    amLODIPine (NORVASC) 2.5 MG tablet Take 1 tablet (2.5 mg total) by mouth once daily.    atorvastatin (LIPITOR) 40 MG tablet Take 1 tablet (40 mg total) by mouth every evening.    BLOOD PRESSURE CUFF Misc 1 each by Misc.(Non-Drug; Combo Route) route once daily.    cyanocobalamin (VITAMIN B-12) 1000 MCG tablet Take 1 tablet (1,000 mcg total) by mouth once daily.    losartan (COZAAR) 100 MG tablet Take 1 tablet (100 mg total) by mouth once daily.    magnesium oxide (MAG-OX) 400 mg (241.3 mg magnesium) tablet Take 1 tablet (400 mg total) by mouth 2 (two) times daily.    meclizine (ANTIVERT) 25 mg tablet TAKE 1 TABLET(25 MG) BY MOUTH THREE TIMES DAILY AS NEEDED FOR DIZZINESS (Patient taking differently: Take 25 mg by mouth 3 (three) times daily as needed for Dizziness.)    metoprolol tartrate (LOPRESSOR) 25 MG tablet TAKE 1 TABLET TWICE DAILY (Patient taking differently: Take 25 mg by mouth 2 (two) times daily.)    oxyCODONE (ROXICODONE) 15 MG Tab Take 15 mg by mouth every 4 (four)  hours as needed (chronic abdominal pain, malignancy related).     Antibiotics (From admission, onward)                Start     Stop Route Frequency Ordered    03/29/22 0900  ertapenem (INVANZ) 1 g in sodium chloride 0.9% 100 mL IVPB         -- IV Every 24 hours (non-standard times) 03/28/22 1545          Antifungals (From admission, onward)                None          Antivirals (From admission, onward)      None             Immunization History   Administered Date(s) Administered    COVID-19, MRNA, LN-S, PF (Pfizer) (Purple Cap) 08/14/2021    Influenza 11/06/2012    Influenza - High Dose - PF (65 years and older) 10/12/2017, 10/12/2018, 03/02/2020    Influenza - Trivalent (ADULT) 11/06/2012    Pneumococcal Conjugate - 13 Valent 10/12/2017    Pneumococcal Polysaccharide - 23 Valent 03/02/2020       Family History       Problem Relation (Age of Onset)    Alzheimer's disease Father    Cancer Mother    No Known Problems Son, Son, Son, Son    Stroke Sister          Social History     Socioeconomic History    Marital status:    Occupational History    Occupation: disabled    Tobacco Use    Smoking status: Never Smoker    Smokeless tobacco: Never Used   Substance and Sexual Activity    Alcohol use: No     Alcohol/week: 0.0 standard drinks    Drug use: No    Sexual activity: Not Currently   Social History Narrative    Pt lives with son     Social Determinants of Health     Financial Resource Strain: Low Risk     Difficulty of Paying Living Expenses: Not very hard   Food Insecurity: No Food Insecurity    Worried About Running Out of Food in the Last Year: Never true    Ran Out of Food in the Last Year: Never true   Transportation Needs: No Transportation Needs    Lack of Transportation (Medical): No    Lack of Transportation (Non-Medical): No   Physical Activity: Inactive    Days of Exercise per Week: 0 days    Minutes of Exercise per Session: 0 min   Social Connections:  Unknown    Marital Status:    Housing Stability: Low Risk     Unable to Pay for Housing in the Last Year: No    Number of Places Lived in the Last Year: 1    Unstable Housing in the Last Year: No     Review of Systems   Constitutional:  Positive for appetite change and chills. Negative for diaphoresis and fever.   Respiratory:  Negative for cough and shortness of breath.    Cardiovascular:  Negative for chest pain.   Gastrointestinal:  Positive for abdominal pain (right sided), diarrhea and nausea. Negative for abdominal distention, constipation and vomiting.   Genitourinary:  Positive for dysuria and frequency. Negative for difficulty urinating and flank pain.   Musculoskeletal:  Negative for arthralgias and gait problem.   Skin:  Negative for color change, pallor and rash.   Neurological:  Negative for speech difficulty and headaches.   Psychiatric/Behavioral:  Negative for agitation and confusion. The patient is not nervous/anxious.    Objective:     Vital Signs (Most Recent):  Temp: 97.9 °F (36.6 °C) (03/28/22 1523)  Pulse: 65 (03/28/22 1523)  Resp: 18 (03/28/22 1523)  BP: (!) 162/70 (03/28/22 1523)  SpO2: (!) 93 % (03/28/22 1523)   Vital Signs (24h Range):  Temp:  [96.5 °F (35.8 °C)-98.7 °F (37.1 °C)] 97.9 °F (36.6 °C)  Pulse:  [56-78] 65  Resp:  [16-18] 18  SpO2:  [91 %-99 %] 93 %  BP: (153-191)/(70-81) 162/70     Weight: 78.4 kg (172 lb 13.5 oz)  Body mass index is 27.9 kg/m².    Estimated Creatinine Clearance: 70.1 mL/min (based on SCr of 0.8 mg/dL).    Physical Exam  Constitutional:       General: She is not in acute distress.     Appearance: She is not toxic-appearing or diaphoretic.   HENT:      Head: Normocephalic and atraumatic.      Nose: Nose normal. No congestion.   Eyes:      General: No scleral icterus.     Conjunctiva/sclera: Conjunctivae normal.   Cardiovascular:      Rate and Rhythm: Normal rate and regular rhythm.   Pulmonary:      Effort: Pulmonary effort is normal. No respiratory  distress.   Abdominal:      General: There is no distension.      Palpations: Abdomen is soft.      Tenderness: There is abdominal tenderness (RUQ). There is no right CVA tenderness or left CVA tenderness.   Genitourinary:     Comments: No suprapubic tenderness  Musculoskeletal:         General: No tenderness or signs of injury.      Cervical back: Neck supple.   Lymphadenopathy:      Cervical: No cervical adenopathy.   Skin:     General: Skin is warm and dry.      Findings: No rash.   Neurological:      Mental Status: She is alert and oriented to person, place, and time.   Psychiatric:         Mood and Affect: Mood normal.         Behavior: Behavior normal.         Thought Content: Thought content normal.       Significant Labs: Blood Culture:   Recent Labs   Lab 11/04/21  1941 11/04/21  2001 11/20/21  1206 11/20/21  1333 03/05/22  1820   LABBLOO No growth after 5 days. No growth after 5 days. No growth after 5 days. No growth after 5 days. No growth after 5 days.  No growth after 5 days.     CBC:   Recent Labs   Lab 03/27/22  1516 03/28/22  0554   WBC 4.18 4.29   HGB 10.0* 9.6*   HCT 32.9* 30.1*    127*     CMP:   Recent Labs   Lab 03/27/22  1516 03/28/22  0553    140   K 3.6 3.4*    108   CO2 24 23    86   BUN 16 14   CREATININE 0.9 0.8   CALCIUM 9.6 9.2   PROT 7.5  --    ALBUMIN 3.3*  --    BILITOT 0.5  --    ALKPHOS 142*  --    AST 16  --    ALT 11  --    ANIONGAP 10 9   EGFRNONAA >60.0 >60.0     Lactic Acid:   Recent Labs   Lab 03/28/22  0032   LACTATE 0.9     Microbiology Results (last 7 days)       Procedure Component Value Units Date/Time    Urine culture [[id number]]  (Abnormal) Collected: 03/27/22 1714    Order Status: Completed Specimen: Urine Updated: 03/28/22 1514     Urine Culture, Routine GRAM NEGATIVE JADEN  >100,000 cfu/ml  Identification and susceptibility pending      Narrative:      Specimen Source->Urine    Clostridium difficile EIA [[id number]] Collected: 03/28/22 1030     Order Status: Sent Specimen: Stool Updated: 03/28/22 1120          Pathology Results  (Last 10 years)      None          Procalcitonin: No results for input(s): PROCAL in the last 48 hours.  Respiratory Culture: No results for input(s): GSRESP, RESPIRATORYC in the last 4320 hours.  Urine Culture:   Recent Labs   Lab 02/02/22  0915 02/15/22  0920 03/01/22  1116 03/14/22  1452 03/27/22  1714   LABURIN KLEBSIELLA PNEUMONIAE ESBL  >100,000 cfu/ml  * Multiple organisms isolated. None in predominance.  Repeat if  clinically necessary. HAFNIA ALVEI  >100,000 cfu/ml  * Multiple organisms isolated. None in predominance.  Repeat if  clinically necessary. GRAM NEGATIVE JADEN  >100,000 cfu/ml  Identification and susceptibility pending  *     Urine Studies:   Recent Labs   Lab 02/15/22  0920 02/23/22  0919 03/18/22  0950 03/27/22  1714   COLORU  --    < > Yellow Rosalva   APPEARANCEUA  --    < >  --  Hazy*   PHUR  --    < > 5 5.0   SPECGRAV  --    < > 1.025 1.015   PROTEINUA  --    < >  --  Negative   GLUCUA  --    < >  --  Negative   KETONESU  --    < > neg Negative   BILIRUBINUA  --    < >  --  Negative   OCCULTUA  --    < >  --  1+*   NITRITE  --    < > neg Positive*   UROBILINOGEN  --    < > noirmal  --    LEUKOCYTESUR  --    < >  --  Trace*   RBCUA 7*   < >  --  5*   WBCUA 8*   < >  --  25*   BACTERIA Few*   < >  --  Many*   SQUAMEPITHEL 4   < >  --  2   HYALINECASTS 0  --   --   --     < > = values in this interval not displayed.     Wound Culture: No results for input(s): LABAERO in the last 4320 hours.  Recent Lab Results  (Last 5 results in the past 24 hours)        03/28/22  0554   03/28/22  0553   03/28/22  0032   03/27/22  2223   03/27/22  1714        Anion Gap   9             Appearance, UA         Hazy       Bacteria, UA         Many       Baso # 0.03               Basophil % 0.7               Bilirubin (UA)         Negative       BUN   14             Calcium   9.2             Chloride   108             CO2   23              Color, UA         Rosalva       Creatinine   0.8             Differential Method Automated               eGFR if    >60.0             eGFR if non    >60.0  Comment: Calculation used to obtain the estimated glomerular filtration  rate (eGFR) is the CKD-EPI equation.                Eos # 0.1               Eosinophil % 1.9               Estimated Avg Glucose   143             Glucose   86             Glucose, UA         Negative       Gran # (ANC) 2.3               Gran % 52.4               Hematocrit 30.1               Hemoglobin 9.6               Hemoglobin A1C External   6.6  Comment: ADA Screening Guidelines:  5.7-6.4%  Consistent with prediabetes  >or=6.5%  Consistent with diabetes    High levels of fetal hemoglobin interfere with the HbA1C  assay. Heterozygous hemoglobin variants (HbS, HgC, etc)do  not significantly interfere with this assay.   However, presence of multiple variants may affect accuracy.               Immature Grans (Abs) 0.05  Comment: Mild elevation in immature granulocytes is non specific and   can be seen in a variety of conditions including stress response,   acute inflammation, trauma and pregnancy. Correlation with other   laboratory and clinical findings is essential.                 Immature Granulocytes 1.2               Ketones, UA         Negative       Lactate, Gee     0.9  Comment: Falsely low lactic acid results can be found in samples   containing >=13.0 mg/dL total bilirubin and/or >=3.5 mg/dL   direct bilirubin.             Leukocytes, UA         Trace       Lymph # 1.4               Lymph % 32.6               MCH 26.5               MCHC 31.9               MCV 83               Microscopic Comment         SEE COMMENT  Comment: Other formed elements not mentioned in the report are not   present in the microscopic examination.          Mono # 0.5               Mono % 11.2               MPV 10.7               NITRITE UA         Positive        nRBC 0               Occult Blood UA         1+       pH, UA         5.0       Platelets 127               Potassium   3.4             Protein, UA         Negative  Comment: Recommend a 24 hour urine protein or a urine   protein/creatinine ratio if globulin induced proteinuria is  clinically suspected.          Acceptable       Yes         RBC 3.62               RBC, UA         5       RDW 13.7               SARS-CoV-2 RNA, Amplification, Qual       Negative         Sodium   140             Specific [place], UA         1.015       Specimen UA         Urine, Clean Catch       Squam Epithel, UA         2       Urine Culture, Routine         GRAM NEGATIVE JADEN  >100,000 cfu/ml  Identification and susceptibility pending    [P]       WBC, UA         25       WBC 4.29                                       [P] - Preliminary Result               Significant Imaging:     US Abdomen Limited [[id number]] Resulted: 03/27/22 1827   Order Status: Completed Updated: 03/27/22 1830   Narrative:     EXAMINATION:   US ABDOMEN LIMITED     CLINICAL HISTORY:   RUQ, s/p leslie, gets bile duct stones;.     TECHNIQUE:   Limited ultrasound of the right upper quadrant of the abdomen including pancreas, liver, gallbladder, common bile duct was performed.     COMPARISON:   CT abdomen pelvis 03/27/2022, MRI abdomen 03/02/2022     FINDINGS:   Liver: Normal in size, measuring 14.3 cm with homogeneous echotexture.  1.9 x 2.0 x 1.5 heterogeneous echogenic lesion in the right hepatic lobe in keeping with known hepatic metastatic disease and better characterized on above comparison MRI abdomen.     Gallbladder: The gallbladder is surgically absent.     Biliary system: The common duct is not dilated, measuring 6 mm.  Mild central intrahepatic biliary dilatation.     IVC: Visualized portions appear within normal limits.     Spleen: Normal in size with a homogeneous echotexture, measuring 7.5 x 3.1 cm.     Pancreas: The visualized portions of  pancreas appear normal.     Miscellaneous: No ascites.    Impression:       Mildly dilated central intrahepatic bile ducts.  Common bile duct is normal caliber status post cholecystectomy.  No choledocholithiasis.     Right hepatic lobe lesion in keeping with known hepatic metastatic disease, better characterized on above comparison MRI.     Electronically signed by resident: Lon Hanson MD   Date: 03/27/2022   Time: 18:12     Electronically signed by: Gen Ray MD   Date: 03/27/2022   Time: 18:27   CT Abdomen Pelvis Without Contrast [[id number]] Resulted: 03/27/22 1556   Order Status: Completed Updated: 03/27/22 1559   Narrative:     EXAMINATION:   CT ABDOMEN PELVIS WITHOUT CONTRAST     CLINICAL HISTORY:   Abdominal pain, post-op;     TECHNIQUE:   Low dose axial images, sagittal and coronal reformations were obtained from the lung bases to the pubic symphysis.  Oral contrast was not administered.     COMPARISON:   03/11/2022     FINDINGS:   The lung bases are clear.     No pericardial effusion.     The noncontrast appearance of the liver demonstrates areas of parenchymal calcifications in the left hepatic lobe, unchanged.     The biliary ducts do not appear dilated.  The common bile duct measures about 6 mm.     No radiopaque stones seen within the common bile duct.     Cholecystectomy clips seen.     The stomach appears normal.  The pancreas appears normal.     The spleen, bilateral adrenal glands appear normal.     There are bilateral sizable forming kidney calculi.  There is no hydronephrosis or hydroureter, no stones seen within the bilateral ureters or within the bladder.     The abdominal aorta tapers normally, no para-aortic lymphadenopathy.     Mild stool retention.  Prior partial colectomy.  No bowel dilation.  No inflammatory changes of the bowel seen.     5.0 x 2.5 cm partially calcified soft tissue mass in the right mid anterior abdomen region, not significantly changed.     Note that the patency  of the mesenteric vasculature cannot be assessed without the use of IV contrast.     The bladder is nondistended.  The uterus is removed.  The left ovary is not seen, the right ovary appears normal.     No free fluid, no free air.     The inguinal regions appear normal.     The osseous structures demonstrate degenerative changes at the bilateral sacroiliac joints.  No osseous lesions.    Impression:       No definite abnormality seen to explain patient's severe pain after ERCP.     Cholecystectomy.     Bilateral numerous nonobstructive kidney calculi     Prior partial colectomy.     Unchanged soft tissue mass within the right lower abdominal quadrant.     Note that the patency of the mesenteric vasculature cannot be assessed on noncontrast images.       Electronically signed by: Renetta Snow MD   Date: 03/27/2022   Time: 15:56

## 2022-03-28 NOTE — PROGRESS NOTES
[facility] - Telemetry [facility] Medicine  Progress Note    Patient Name: Patito Domingo  MRN: [id number]  Patient Class: OP- Observation   Admission Date: 3/27/2022  Length of Stay: 0 days  Attending Physician: Irma Metcalf MD  Primary Care Provider: Yasir Myers Jr, MD        Subjective:     Principal Problem:Urinary tract infection without hematuria        HPI:  Patito Domingo is a 71 y.o. female with a PMHx of HTN, HLD, HFpEF, carcinoid tumor of midgut, s/p CCY with recent ERCP 2/2 retained stones who presents to [facility] [place] with complaints of abdominal pain. Patient reports intermittent RLQ abdominal pain with associated nausea, dry heaving, and decreased PO intake since her recent ERCP 3 weeks ago. She has difficulty describing characteristics of abdominal pain but says she thinks pain worsens after eating. She also reports several episodes of diffuse watery diarrhea for the last few days, says she had 6 episodes prior to arrival to the ED today. She took imodium 2 days ago without improvement. She denies dark/bloody stools but says her stool appears grey colored. Patient also complaints of urinary frequency, dysuria, and feeling hot/cold recently. Denies fever, chills, diaphoresis, vomiting, fever, cough, SOB, chest pain, dizziness, HA, or syncope.     ED: AFVSS. No leukocytosis. RUQ US and CT abd/pelvis without acute abnormalities. UA consistent with infection. Most recent Ucx grew Klebsiella and Hafnia resistant to multiple organisms. Given IV meropenem based on recent Ucx sensitivities.       Overview/Hospital Course:  Patient admitted for UTI. Afebrile without leukocytosis. CT AP unremarkable. UA +. Started on IV meropenem based off of prior Ucx. ID consulted. Also reported some diarrhea, stool studies pending.       Interval History: Patient seen this AM,s till with lower abd pain and discomfort after eating. Reporting dysuria and suprapubic pain as well. Stool studies pending. Continue IV  meropenem, await ID recs    Review of Systems   Constitutional:  Positive for appetite change. Negative for chills and fever.   Respiratory:  Negative for chest tightness and shortness of breath.    Cardiovascular:  Negative for chest pain.   Gastrointestinal:  Positive for abdominal pain, diarrhea and nausea. Negative for abdominal distention and vomiting.   Genitourinary:  Positive for dysuria and frequency. Negative for difficulty urinating and hematuria.   Musculoskeletal:  Negative for arthralgias and gait problem.   Skin:  Negative for color change.   Neurological:  Negative for speech difficulty and light-headedness.   Psychiatric/Behavioral:  Negative for agitation and confusion.    Objective:     Vital Signs (Most Recent):  Temp: 98.5 °F (36.9 °C) (03/28/22 1117)  Pulse: (!) 56 (03/28/22 1117)  Resp: 18 (03/28/22 1133)  BP: (!) 157/70 (03/28/22 1117)  SpO2: (!) 92 % (03/28/22 1117)   Vital Signs (24h Range):  Temp:  [96.5 °F (35.8 °C)-98.7 °F (37.1 °C)] 98.5 °F (36.9 °C)  Pulse:  [56-78] 56  Resp:  [16-20] 18  SpO2:  [91 %-99 %] 92 %  BP: (148-191)/(65-81) 157/70     Weight: 78.4 kg (172 lb 13.5 oz)  Body mass index is 27.9 kg/m².    Intake/Output Summary (Last 24 hours) at 3/28/2022 1213  Last data filed at 3/28/2022 1100  Gross per 24 hour   Intake 1050 ml   Output 200 ml   Net 850 ml      Physical Exam  Vitals and nursing note reviewed.   Constitutional:       General: She is not in acute distress.     Appearance: She is well-developed. She is obese.   HENT:      Head: Normocephalic and atraumatic.   Eyes:      Extraocular Movements: Extraocular movements intact.   Cardiovascular:      Rate and Rhythm: Normal rate and regular rhythm.      Heart sounds: Normal heart sounds.   Pulmonary:      Effort: Pulmonary effort is normal. No respiratory distress.   Abdominal:      General: Abdomen is flat. Bowel sounds are normal. There is no distension.      Palpations: Abdomen is soft.      Tenderness: There is  abdominal tenderness (RLQ).   Musculoskeletal:         General: Normal range of motion.      Right lower leg: No edema.      Left lower leg: No edema.   Skin:     General: Skin is warm and dry.   Neurological:      General: No focal deficit present.      Mental Status: She is alert.   Psychiatric:         Mood and Affect: Mood normal.         Behavior: Behavior normal.         Thought Content: Thought content normal.       Significant Labs: All pertinent labs within the past 24 hours have been reviewed.    Significant Imaging: I have reviewed all pertinent imaging results/findings within the past 24 hours.      Assessment/Plan:      * Urinary tract infection without hematuria  History of ESBL E. coli infection  Multiple drug resistant organism (MDRO) culture positive  - hx recurrent MDRO UTIs including ESBL   - afebrile without leukocytosis  - UA infectious, + dysuria  - given IV meropenem in the ED based on prior UCx sensitivities, will continue  - ID consulted for further antibiotic clearance  - follow UCx  - gentle IVFs overnight    Generalized abdominal pain  - suspect largely due to abdominal carcinoid tumor and UTI contributing, though some c/f c.diff given multiple episodes of diffuse diarrhea   - LFTs and lipase WNL  - RUQ US and CT abd/pelvis without etiology of abd pain  - c.dif pending  - continue home pain meds  - PRN anti-emetics   - add famotidine and reglan     Malignant carcinoid tumor of midgut  - likely cause/ contributing to abdominal pain  - follows with heme/onc as outpatient  - continue home PRN oxycodone    Chronic diastolic heart failure with preserved ejection fraction  - appears dry vs euvolemic  - monitor volume status s/p IVFs  - continue ARB, BB  - strict I/Os    Vertigo  - continue PRN meclizine    HLD (hyperlipidemia)  - continue statin     Essential hypertension  - continue amlodipine 2.5mg daily, losartan 100mg daily and MTP 25mg BID      VTE Risk Mitigation (From admission, onward)          Ordered     enoxaparin injection 40 mg  Daily         03/27/22 2250     IP VTE HIGH RISK PATIENT  Once         03/27/22 2250     Place sequential compression device  Until discontinued         03/27/22 2240                Discharge Planning   JULIO C: 3/29/2022     Code Status: Full Code   Is the patient medically ready for discharge?:     Reason for patient still in hospital (select all that apply): Treatment and Pending disposition                     Mindy Brizuela PA-C  Department of Hospital Medicine   [facility] - Telemetry Stepdown

## 2022-03-28 NOTE — HOSPITAL COURSE
Patient admitted for UTI. Afebrile without leukocytosis. CT AP unremarkable. UA +. Started on IV meropenem based off of prior Ucx. Ucx here showing Klebsiella ESBL. ID consulted and switched to ertapenem as her organism is sensitive to this and it is a 1x day dose. However pt says it burned her arm, will see if patient can tolerate today. Ultimately switched to meropenem due to adverse effects. Also reported some diarrhea, c diff negative. Will start probiotic. PT/OT consulted, recommend SNF. Patient states she will not go to a SNF unless it is with [facility]. Plan for discharge home with IV abx per patient's and family's request. Last day of IV meropenem on 4/10/22. Stable for discharge with ID and PCP follow-up. Referral placed to urology for chronic nephrolithiasis. Return precautions given and patient verbalized understanding.

## 2022-03-28 NOTE — ASSESSMENT & PLAN NOTE
- likely cause/ contributing to abdominal pain  - follows with heme/onc as outpatient  - continue home PRN oxycodone

## 2022-03-28 NOTE — ED NOTES
Pt PO challenged by MD with crackgerardo et water, pt tolerated PO intake well with no c/o abd pain, n/v. Provider notified.

## 2022-03-28 NOTE — PROVIDER PROGRESS NOTES - EMERGENCY DEPT.
Encounter Date: 3/27/2022    ED Physician Progress Notes        Physician Note:   S/o Dr. Wynn:    US shows: Mildly dilated central intrahepatic bile ducts.  Common bile duct is normal caliber status post cholecystectomy.  No choledocholithiasis.    Urinalysis positive for nitrite positive infection, prior urine cultures in March positive for HAFNIA ALVEI and in February for Klebsiella, Klebsiella sensitive only to nitrofurantoin and Penems, will give meropenem for potential Klebsiella.  Ultrasound shows no sign of choledocholithiasis, no CBD dilatation, labs within normal limits.  Will p.o. challenge.  If patient unable to tolerate p.o., will discuss with [facility] and plan admission for UTI.    Pt tolerating PO, but will admit for MDRO UTI

## 2022-03-28 NOTE — HPI
Patito Domingo is a 71 y.o. 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    Patient reports RLQ abdominal pain with associated nausea, decreased PO intake since her recent ERCP. She also reports developing several episodes of diffuse watery diarrhea for which she has been seen in the ED a few times. In addition, she endorses urinary frequency, dysuria, and chills.     Afebrile and VSS. No leukocytosis. UA with positive nitrites, trace leuks, 25 WBC. RUQ US and CT abd/pelvis without acute abnormalities. No choledocholithiasis. No hydronephrosis. Non obstucting renal stones present. As most recent Ucx grew  ESBL Klebsiella and Hafnia, patient started on meropenem. Urine cx 3/27 is pending. C diff pending.  ID consulted for antibiotic recommendations. Patient lives alone and cares for herself.

## 2022-03-28 NOTE — PLAN OF CARE
No acute events occurred during shift.  Pt c/o pain to the abdomen for which PRN pain medication was given.  Pt also c/o pain to the right shoulder- Voltaren cream applied.  Pt had multiple episodes of diarrhea- C. Diff pending.  IV fluids in place.  Pt ambulatory with rolling walker.  POC reviewed with pt.  Questions and concerns addressed.  WCTM.        Problem: Adult Inpatient Plan of Care  Goal: Plan of Care Review  Outcome: Ongoing, Progressing  Goal: Patient-Specific Goal (Individualized)  Outcome: Ongoing, Progressing  Goal: Absence of Hospital-Acquired Illness or Injury  Outcome: Ongoing, Progressing  Goal: Optimal Comfort and Wellbeing  Outcome: Ongoing, Progressing  Goal: Readiness for Transition of Care  Outcome: Ongoing, Progressing     Problem: Infection  Goal: Absence of Infection Signs and Symptoms  Outcome: Ongoing, Progressing     Problem: Fluid and Electrolyte Imbalance (Acute Kidney Injury/Impairment)  Goal: Fluid and Electrolyte Balance  Outcome: Ongoing, Progressing     Problem: Oral Intake Inadequate (Acute Kidney Injury/Impairment)  Goal: Optimal Nutrition Intake  Outcome: Ongoing, Progressing     Problem: Renal Function Impairment (Acute Kidney Injury/Impairment)  Goal: Effective Renal Function  Outcome: Ongoing, Progressing

## 2022-03-29 LAB
ANION GAP SERPL CALC-SCNC: 9 MMOL/L (ref 8–16)
BACTERIA UR CULT: ABNORMAL
BASOPHILS # BLD AUTO: 0.03 K/UL (ref 0–0.2)
BASOPHILS NFR BLD: 0.8 % (ref 0–1.9)
BUN SERPL-MCNC: 12 MG/DL (ref 8–23)
CALCIUM SERPL-MCNC: 9.5 MG/DL (ref 8.7–10.5)
CHLORIDE SERPL-SCNC: 109 MMOL/L (ref 95–110)
CO2 SERPL-SCNC: 24 MMOL/L (ref 23–29)
CREAT SERPL-MCNC: 0.8 MG/DL (ref 0.5–1.4)
DIFFERENTIAL METHOD: ABNORMAL
EOSINOPHIL # BLD AUTO: 0.1 K/UL (ref 0–0.5)
EOSINOPHIL NFR BLD: 3 % (ref 0–8)
ERYTHROCYTE [DISTWIDTH] IN BLOOD BY AUTOMATED COUNT: 13.9 % (ref 11.5–14.5)
EST. GFR  (AFRICAN AMERICAN): >60 ML/MIN/1.73 M^2
EST. GFR  (NON AFRICAN AMERICAN): >60 ML/MIN/1.73 M^2
GLUCOSE SERPL-MCNC: 97 MG/DL (ref 70–110)
HCT VFR BLD AUTO: 29.3 % (ref 37–48.5)
HGB BLD-MCNC: 9 G/DL (ref 12–16)
IMM GRANULOCYTES # BLD AUTO: 0 K/UL (ref 0–0.04)
IMM GRANULOCYTES NFR BLD AUTO: 0 % (ref 0–0.5)
LYMPHOCYTES # BLD AUTO: 1.2 K/UL (ref 1–4.8)
LYMPHOCYTES NFR BLD: 32.3 % (ref 18–48)
MCH RBC QN AUTO: 26 PG (ref 27–31)
MCHC RBC AUTO-ENTMCNC: 30.7 G/DL (ref 32–36)
MCV RBC AUTO: 85 FL (ref 82–98)
MONOCYTES # BLD AUTO: 0.5 K/UL (ref 0.3–1)
MONOCYTES NFR BLD: 12.7 % (ref 4–15)
NEUTROPHILS # BLD AUTO: 1.9 K/UL (ref 1.8–7.7)
NEUTROPHILS NFR BLD: 51.2 % (ref 38–73)
NRBC BLD-RTO: 0 /100 WBC
PLATELET # BLD AUTO: 217 K/UL (ref 150–450)
PMV BLD AUTO: 10.5 FL (ref 9.2–12.9)
POTASSIUM SERPL-SCNC: 3.8 MMOL/L (ref 3.5–5.1)
RBC # BLD AUTO: 3.46 M/UL (ref 4–5.4)
SODIUM SERPL-SCNC: 142 MMOL/L (ref 136–145)
WBC # BLD AUTO: 3.62 K/UL (ref 3.9–12.7)

## 2022-03-29 PROCEDURE — 99226 PR SUBSEQUENT OBSERVATION CARE,LEVEL III: CPT | Mod: ,,, | Performed by: PHYSICIAN ASSISTANT

## 2022-03-29 PROCEDURE — 96367 TX/PROPH/DG ADDL SEQ IV INF: CPT

## 2022-03-29 PROCEDURE — 63600175 PHARM REV CODE 636 W HCPCS: Performed by: PHYSICIAN ASSISTANT

## 2022-03-29 PROCEDURE — 99233 PR SUBSEQUENT HOSPITAL CARE,LEVL III: ICD-10-PCS | Mod: ,,, | Performed by: PHYSICIAN ASSISTANT

## 2022-03-29 PROCEDURE — 80048 BASIC METABOLIC PNL TOTAL CA: CPT | Performed by: PHYSICIAN ASSISTANT

## 2022-03-29 PROCEDURE — 25000003 PHARM REV CODE 250: Performed by: PHYSICIAN ASSISTANT

## 2022-03-29 PROCEDURE — 85025 COMPLETE CBC W/AUTO DIFF WBC: CPT | Performed by: PHYSICIAN ASSISTANT

## 2022-03-29 PROCEDURE — 25000003 PHARM REV CODE 250: Performed by: EMERGENCY MEDICINE

## 2022-03-29 PROCEDURE — 99233 SBSQ HOSP IP/OBS HIGH 50: CPT | Mod: ,,, | Performed by: PHYSICIAN ASSISTANT

## 2022-03-29 PROCEDURE — 99226 PR SUBSEQUENT OBSERVATION CARE,LEVEL III: ICD-10-PCS | Mod: ,,, | Performed by: PHYSICIAN ASSISTANT

## 2022-03-29 PROCEDURE — 36415 COLL VENOUS BLD VENIPUNCTURE: CPT | Performed by: PHYSICIAN ASSISTANT

## 2022-03-29 PROCEDURE — 20600001 HC STEP DOWN PRIVATE ROOM

## 2022-03-29 RX ORDER — LOPERAMIDE HYDROCHLORIDE 2 MG/1
2 CAPSULE ORAL 4 TIMES DAILY PRN
Status: DISCONTINUED | OUTPATIENT
Start: 2022-03-29 | End: 2022-03-30

## 2022-03-29 RX ORDER — HYDRALAZINE HYDROCHLORIDE 25 MG/1
25 TABLET, FILM COATED ORAL EVERY 8 HOURS PRN
Status: DISCONTINUED | OUTPATIENT
Start: 2022-03-30 | End: 2022-04-01 | Stop reason: HOSPADM

## 2022-03-29 RX ADMIN — METOPROLOL TARTRATE 25 MG: 25 TABLET, FILM COATED ORAL at 08:03

## 2022-03-29 RX ADMIN — ENOXAPARIN SODIUM 40 MG: 100 INJECTION SUBCUTANEOUS at 05:03

## 2022-03-29 RX ADMIN — AMLODIPINE BESYLATE 2.5 MG: 2.5 TABLET ORAL at 08:03

## 2022-03-29 RX ADMIN — Medication 1 CAPSULE: at 11:03

## 2022-03-29 RX ADMIN — METOCLOPRAMIDE 5 MG: 5 TABLET ORAL at 05:03

## 2022-03-29 RX ADMIN — DICLOFENAC SODIUM 2 G: 10 GEL TOPICAL at 08:03

## 2022-03-29 RX ADMIN — METOCLOPRAMIDE 5 MG: 5 TABLET ORAL at 10:03

## 2022-03-29 RX ADMIN — METOPROLOL TARTRATE 25 MG: 25 TABLET, FILM COATED ORAL at 09:03

## 2022-03-29 RX ADMIN — HYDRALAZINE HYDROCHLORIDE 25 MG: 25 TABLET, FILM COATED ORAL at 11:03

## 2022-03-29 RX ADMIN — LOPERAMIDE HYDROCHLORIDE 2 MG: 2 CAPSULE ORAL at 11:03

## 2022-03-29 RX ADMIN — OXYCODONE HYDROCHLORIDE 15 MG: 10 TABLET ORAL at 11:03

## 2022-03-29 RX ADMIN — FAMOTIDINE 20 MG: 20 TABLET ORAL at 08:03

## 2022-03-29 RX ADMIN — LOSARTAN POTASSIUM 100 MG: 50 TABLET, FILM COATED ORAL at 08:03

## 2022-03-29 RX ADMIN — CYANOCOBALAMIN TAB 1000 MCG 1000 MCG: 1000 TAB at 09:03

## 2022-03-29 RX ADMIN — OXYCODONE HYDROCHLORIDE 15 MG: 10 TABLET ORAL at 10:03

## 2022-03-29 RX ADMIN — FAMOTIDINE 20 MG: 20 TABLET ORAL at 09:03

## 2022-03-29 RX ADMIN — ATORVASTATIN CALCIUM 40 MG: 20 TABLET, FILM COATED ORAL at 11:03

## 2022-03-29 RX ADMIN — METHOCARBAMOL 500 MG: 500 TABLET ORAL at 09:03

## 2022-03-29 RX ADMIN — OXYCODONE HYDROCHLORIDE 15 MG: 10 TABLET ORAL at 05:03

## 2022-03-29 RX ADMIN — ERTAPENEM 1 G: 1 INJECTION INTRAMUSCULAR; INTRAVENOUS at 08:03

## 2022-03-29 RX ADMIN — Medication 6 MG: at 09:03

## 2022-03-29 RX ADMIN — LOPERAMIDE HYDROCHLORIDE 2 MG: 2 CAPSULE ORAL at 09:03

## 2022-03-29 NOTE — SUBJECTIVE & OBJECTIVE
Interval History: NAEON. Afebrile. No leukocytosis. C diff negative. Urine cx showing a GNR.Patient reports diarrhea has decreased today though still with abdominal pain. Asking for immodium. Received ertapenem today and reports RUE burning down to her hand. Asking for alternative therapy.     Review of Systems   Constitutional:  Positive for appetite change and chills. Negative for diaphoresis and fever.   Respiratory:  Negative for cough and shortness of breath.    Cardiovascular:  Negative for chest pain.   Gastrointestinal:  Positive for abdominal pain (right sided), diarrhea and nausea. Negative for abdominal distention, constipation and vomiting.   Genitourinary:  Positive for dysuria and frequency. Negative for difficulty urinating and flank pain.   Musculoskeletal:  Negative for arthralgias and gait problem.   Skin:  Negative for color change, pallor and rash.   Neurological:  Negative for speech difficulty and headaches.   Psychiatric/Behavioral:  Negative for agitation and confusion. The patient is not nervous/anxious.    Objective:     Vital Signs (Most Recent):  Temp: 98.7 °F (37.1 °C) (03/29/22 0723)  Pulse: 63 (03/29/22 0741)  Resp: 18 (03/29/22 1042)  BP: (!) 161/70 (03/29/22 0723)  SpO2: 95 % (03/29/22 0723)   Vital Signs (24h Range):  Temp:  [97.9 °F (36.6 °C)-98.7 °F (37.1 °C)] 98.7 °F (37.1 °C)  Pulse:  [56-71] 63  Resp:  [18-20] 18  SpO2:  [91 %-98 %] 95 %  BP: (142-181)/(70-78) 161/70     Weight: 78.4 kg (172 lb 13.5 oz)  Body mass index is 27.9 kg/m².    Estimated Creatinine Clearance: 70.1 mL/min (based on SCr of 0.8 mg/dL).    Physical Exam  Constitutional:       General: She is not in acute distress.     Appearance: She is not toxic-appearing or diaphoretic.   HENT:      Head: Normocephalic and atraumatic.      Nose: Nose normal. No congestion.   Eyes:      General: No scleral icterus.     Conjunctiva/sclera: Conjunctivae normal.   Cardiovascular:      Rate and Rhythm: Normal rate and  regular rhythm.   Pulmonary:      Effort: Pulmonary effort is normal. No respiratory distress.   Abdominal:      General: There is no distension.      Palpations: Abdomen is soft.      Tenderness: There is abdominal tenderness (RUQ).   Genitourinary:     Comments: No suprapubic tenderness  Musculoskeletal:         General: Tenderness (RUE) present. No swelling.      Cervical back: Neck supple.   Lymphadenopathy:      Cervical: No cervical adenopathy.   Skin:     General: Skin is warm and dry.      Findings: No rash.   Neurological:      Mental Status: She is alert and oriented to person, place, and time.   Psychiatric:         Mood and Affect: Mood normal.         Behavior: Behavior normal.         Thought Content: Thought content normal.       Significant Labs: Blood Culture:   Recent Labs   Lab 11/04/21  1941 11/04/21  2001 11/20/21  1206 11/20/21  1333 03/05/22  1820   LABBLOO No growth after 5 days. No growth after 5 days. No growth after 5 days. No growth after 5 days. No growth after 5 days.  No growth after 5 days.     CBC:   Recent Labs   Lab 03/27/22  1516 03/28/22  0554 03/29/22  0534   WBC 4.18 4.29 3.62*   HGB 10.0* 9.6* 9.0*   HCT 32.9* 30.1* 29.3*    127* 217     CMP:   Recent Labs   Lab 03/27/22  1516 03/28/22  0553 03/29/22  0534    140 142   K 3.6 3.4* 3.8    108 109   CO2 24 23 24    86 97   BUN 16 14 12   CREATININE 0.9 0.8 0.8   CALCIUM 9.6 9.2 9.5   PROT 7.5  --   --    ALBUMIN 3.3*  --   --    BILITOT 0.5  --   --    ALKPHOS 142*  --   --    AST 16  --   --    ALT 11  --   --    ANIONGAP 10 9 9   EGFRNONAA >60.0 >60.0 >60.0     Microbiology Results (last 7 days)       Procedure Component Value Units Date/Time    Clostridium difficile EIA [[id number]] Collected: 03/28/22 1030    Order Status: Completed Specimen: Stool Updated: 03/28/22 2108     C. diff Antigen Negative     C difficile Toxins A+B, EIA Negative     Comment: Testing not recommended for children <24 months  old.       Urine culture [[id number]]  (Abnormal) Collected: 03/27/22 1714    Order Status: Completed Specimen: Urine Updated: 03/28/22 1514     Urine Culture, Routine GRAM NEGATIVE JADEN  >100,000 cfu/ml  Identification and susceptibility pending      Narrative:      Specimen Source->Urine          Pathology Results  (Last 10 years)      None          Urine Culture:   Recent Labs   Lab 02/02/22  0915 02/15/22  0920 03/01/22  1116 03/14/22  1452 03/27/22  1714   LABURIN KLEBSIELLA PNEUMONIAE ESBL  >100,000 cfu/ml  * Multiple organisms isolated. None in predominance.  Repeat if  clinically necessary. HAFNIA ALVEI  >100,000 cfu/ml  * Multiple organisms isolated. None in predominance.  Repeat if  clinically necessary. GRAM NEGATIVE JADEN  >100,000 cfu/ml  Identification and susceptibility pending  *     Urine Studies:   Recent Labs   Lab 02/15/22  0920 02/23/22  0919 03/18/22  0950 03/27/22  1714   COLORU  --    < > Yellow Rosalva   APPEARANCEUA  --    < >  --  Hazy*   PHUR  --    < > 5 5.0   SPECGRAV  --    < > 1.025 1.015   PROTEINUA  --    < >  --  Negative   GLUCUA  --    < >  --  Negative   KETONESU  --    < > neg Negative   BILIRUBINUA  --    < >  --  Negative   OCCULTUA  --    < >  --  1+*   NITRITE  --    < > neg Positive*   UROBILINOGEN  --    < > noirmal  --    LEUKOCYTESUR  --    < >  --  Trace*   RBCUA 7*   < >  --  5*   WBCUA 8*   < >  --  25*   BACTERIA Few*   < >  --  Many*   SQUAMEPITHEL 4   < >  --  2   HYALINECASTS 0  --   --   --     < > = values in this interval not displayed.     Wound Culture: No results for input(s): LABAERO in the last 4320 hours.  Recent Lab Results         03/29/22  0534        Anion Gap 9       Baso # 0.03       Basophil % 0.8       BUN 12       Calcium 9.5       Chloride 109       CO2 24       Creatinine 0.8       Differential Method Automated       eGFR if  >60.0       eGFR if non  >60.0  Comment: Calculation used to obtain the estimated  glomerular filtration  rate (eGFR) is the CKD-EPI equation.          Eos # 0.1       Eosinophil % 3.0       Glucose 97       Gran # (ANC) 1.9       Gran % 51.2       Hematocrit 29.3       Hemoglobin 9.0       Immature Grans (Abs) 0.00  Comment: Mild elevation in immature granulocytes is non specific and   can be seen in a variety of conditions including stress response,   acute inflammation, trauma and pregnancy. Correlation with other   laboratory and clinical findings is essential.         Immature Granulocytes 0.0       Lymph # 1.2       Lymph % 32.3       MCH 26.0       MCHC 30.7       MCV 85       Mono # 0.5       Mono % 12.7       MPV 10.5       nRBC 0       Platelets 217       Potassium 3.8       RBC 3.46       RDW 13.9       Sodium 142       WBC 3.62               Significant Imaging:     US Abdomen Limited [[id number]] Resulted: 03/27/22 1827   Order Status: Completed Updated: 03/27/22 1830   Narrative:     EXAMINATION:   US ABDOMEN LIMITED     CLINICAL HISTORY:   RUQ, s/p leslie, gets bile duct stones;.     TECHNIQUE:   Limited ultrasound of the right upper quadrant of the abdomen including pancreas, liver, gallbladder, common bile duct was performed.     COMPARISON:   CT abdomen pelvis 03/27/2022, MRI abdomen 03/02/2022     FINDINGS:   Liver: Normal in size, measuring 14.3 cm with homogeneous echotexture.  1.9 x 2.0 x 1.5 heterogeneous echogenic lesion in the right hepatic lobe in keeping with known hepatic metastatic disease and better characterized on above comparison MRI abdomen.     Gallbladder: The gallbladder is surgically absent.     Biliary system: The common duct is not dilated, measuring 6 mm.  Mild central intrahepatic biliary dilatation.     IVC: Visualized portions appear within normal limits.     Spleen: Normal in size with a homogeneous echotexture, measuring 7.5 x 3.1 cm.     Pancreas: The visualized portions of pancreas appear normal.     Miscellaneous: No ascites.    Impression:        Mildly dilated central intrahepatic bile ducts.  Common bile duct is normal caliber status post cholecystectomy.  No choledocholithiasis.     Right hepatic lobe lesion in keeping with known hepatic metastatic disease, better characterized on above comparison MRI.     Electronically signed by resident: Lon Hanson MD   Date: 03/27/2022   Time: 18:12     Electronically signed by: Gen Ray MD   Date: 03/27/2022   Time: 18:27   CT Abdomen Pelvis Without Contrast [[id number]] Resulted: 03/27/22 1556   Order Status: Completed Updated: 03/27/22 1559   Narrative:     EXAMINATION:   CT ABDOMEN PELVIS WITHOUT CONTRAST     CLINICAL HISTORY:   Abdominal pain, post-op;     TECHNIQUE:   Low dose axial images, sagittal and coronal reformations were obtained from the lung bases to the pubic symphysis.  Oral contrast was not administered.     COMPARISON:   03/11/2022     FINDINGS:   The lung bases are clear.     No pericardial effusion.     The noncontrast appearance of the liver demonstrates areas of parenchymal calcifications in the left hepatic lobe, unchanged.     The biliary ducts do not appear dilated.  The common bile duct measures about 6 mm.     No radiopaque stones seen within the common bile duct.     Cholecystectomy clips seen.     The stomach appears normal.  The pancreas appears normal.     The spleen, bilateral adrenal glands appear normal.     There are bilateral sizable forming kidney calculi.  There is no hydronephrosis or hydroureter, no stones seen within the bilateral ureters or within the bladder.     The abdominal aorta tapers normally, no para-aortic lymphadenopathy.     Mild stool retention.  Prior partial colectomy.  No bowel dilation.  No inflammatory changes of the bowel seen.     5.0 x 2.5 cm partially calcified soft tissue mass in the right mid anterior abdomen region, not significantly changed.     Note that the patency of the mesenteric vasculature cannot be assessed without the use of IV  contrast.     The bladder is nondistended.  The uterus is removed.  The left ovary is not seen, the right ovary appears normal.     No free fluid, no free air.     The inguinal regions appear normal.     The osseous structures demonstrate degenerative changes at the bilateral sacroiliac joints.  No osseous lesions.    Impression:       No definite abnormality seen to explain patient's severe pain after ERCP.     Cholecystectomy.     Bilateral numerous nonobstructive kidney calculi     Prior partial colectomy.     Unchanged soft tissue mass within the right lower abdominal quadrant.     Note that the patency of the mesenteric vasculature cannot be assessed on noncontrast images.       Electronically signed by: Renetta Snow MD   Date: 03/27/2022   Time: 15:56

## 2022-03-29 NOTE — ASSESSMENT & PLAN NOTE
History of ESBL E. coli infection  Multiple drug resistant organism (MDRO) culture positive  - hx recurrent MDRO UTIs including ESBL   - afebrile without leukocytosis  - UA infectious, + dysuria  - given IV meropenem in the ED based on prior UCx sensitivities, will continue  - ID consulted for further antibiotic clearance- switched to ertapenem however patient had pain with administration so switched back to merropenem  - follow UCx- gnr  - gentle IVFs overnight  - start probiotic

## 2022-03-29 NOTE — PROGRESS NOTES
[facility] - Telemetry Stepdown  Infectious Disease  Progress Note    Patient Name: Patito Domingo  MRN: [id number]  Admission Date: 3/27/2022  Length of Stay: 0 days  Attending Physician: Irma Metcalf MD  Primary Care Provider: Yasir Myers Jr, MD    Isolation Status: No active isolations  Assessment/Plan:      History of ESBL klebsiella infection     See A/P below    Urinary tract infection without hematuria     71 year old female with history of HTN, HFpEF, carcinoid tumor of abdomen, chronic bilateral renal stones,  recurrent UTIs followed by urology, prior cholecystectomy, choledocholithiasis with recent ERCP/biliary sphincterotomy on 3/4 who presents to [facility] [place] with complaints of abdominal pain, diarrhea and dysuria.     Afebrile and VSS. No leukocytosis. C diff negative. UA positive for pyuria and urine cx showing a GNR. RUQ US and CT abd/pelvis without acute abnormalities. No choledocholithiasis. No hydronephrosis. Non obstucting renal stones present and stable. Patient started on a carbapenem given hx of MDR UTIs.      If urine culture results as ESBL Klebsiella again, it is possible her non obstructing renal stones are colonized. Reviewed last urology note (Dr. Izaguirre) who did not recommend treatment of stones given chronicity, metastatic NET, multiple medical problems, etc.  Would only treat if obstructing/symptomatic.      Recommendations  · Continue Ertapenem  · Follow up urine culture to guide antibiotics  · Pain management/diarrhea management per primary team  · Anticipate IV antibiotics at discharge  · Discussed plan with ID staff. ID will follow.         Please call  or secure chat for any questions. Thank you.  Hien Dong PA-C  ID [id number] Spectra: [id number]    Subjective:     Principal Problem:Urinary tract infection without hematuria    HPI: Patito Domingo is a 71 y.o. 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    Patient reports RLQ abdominal pain with associated nausea, decreased PO intake since her recent ERCP. She also reports developing several episodes of diffuse watery diarrhea for which she has been seen in the ED a few times. In addition, she endorses urinary frequency, dysuria, and chills.     Afebrile and VSS. No leukocytosis. UA with positive nitrites, trace leuks, 25 WBC. RUQ US and CT abd/pelvis without acute abnormalities. No choledocholithiasis. No hydronephrosis. Non obstucting renal stones present. As most recent Ucx grew  ESBL Klebsiella and Hafnia, patient started on meropenem. Urine cx 3/27 is pending. C diff pending.  ID consulted for antibiotic recommendations. Patient lives alone and cares for herself.    Interval History: NAEON. Afebrile. No leukocytosis. C diff negative. Urine cx showing a GNR.Patient reports diarrhea has decreased today though still with abdominal pain. Asking for immodium. Received ertapenem today and reports RUE burning down to her hand. Asking for alternative therapy.     Review of Systems   Constitutional:  Positive for appetite change and chills. Negative for diaphoresis and fever.   Respiratory:  Negative for cough and shortness of breath.    Cardiovascular:  Negative for chest pain.   Gastrointestinal:  Positive for abdominal pain (right sided), diarrhea and nausea. Negative for abdominal distention, constipation and vomiting.   Genitourinary:  Positive for dysuria and frequency. Negative for difficulty urinating and flank pain.   Musculoskeletal:  Negative for arthralgias and gait problem.   Skin:  Negative for color change, pallor and rash.   Neurological:  Negative for speech difficulty and headaches.   Psychiatric/Behavioral:  Negative for agitation and  confusion. The patient is not nervous/anxious.    Objective:     Vital Signs (Most Recent):  Temp: 98.7 °F (37.1 °C) (03/29/22 0723)  Pulse: 63 (03/29/22 0741)  Resp: 18 (03/29/22 1042)  BP: (!) 161/70 (03/29/22 0723)  SpO2: 95 % (03/29/22 0723)   Vital Signs (24h Range):  Temp:  [97.9 °F (36.6 °C)-98.7 °F (37.1 °C)] 98.7 °F (37.1 °C)  Pulse:  [56-71] 63  Resp:  [18-20] 18  SpO2:  [91 %-98 %] 95 %  BP: (142-181)/(70-78) 161/70     Weight: 78.4 kg (172 lb 13.5 oz)  Body mass index is 27.9 kg/m².    Estimated Creatinine Clearance: 70.1 mL/min (based on SCr of 0.8 mg/dL).    Physical Exam  Constitutional:       General: She is not in acute distress.     Appearance: She is not toxic-appearing or diaphoretic.   HENT:      Head: Normocephalic and atraumatic.      Nose: Nose normal. No congestion.   Eyes:      General: No scleral icterus.     Conjunctiva/sclera: Conjunctivae normal.   Cardiovascular:      Rate and Rhythm: Normal rate and regular rhythm.   Pulmonary:      Effort: Pulmonary effort is normal. No respiratory distress.   Abdominal:      General: There is no distension.      Palpations: Abdomen is soft.      Tenderness: There is abdominal tenderness (RUQ).   Genitourinary:     Comments: No suprapubic tenderness  Musculoskeletal:         General: Tenderness (RUE) present. No swelling.      Cervical back: Neck supple.   Lymphadenopathy:      Cervical: No cervical adenopathy.   Skin:     General: Skin is warm and dry.      Findings: No rash.   Neurological:      Mental Status: She is alert and oriented to person, place, and time.   Psychiatric:         Mood and Affect: Mood normal.         Behavior: Behavior normal.         Thought Content: Thought content normal.       Significant Labs: Blood Culture:   Recent Labs   Lab 11/04/21  1941 11/04/21 2001 11/20/21  1206 11/20/21  1333 03/05/22  1820   LABBLOO No growth after 5 days. No growth after 5 days. No growth after 5 days. No growth after 5 days. No growth after  5 days.  No growth after 5 days.     CBC:   Recent Labs   Lab 03/27/22  1516 03/28/22  0554 03/29/22  0534   WBC 4.18 4.29 3.62*   HGB 10.0* 9.6* 9.0*   HCT 32.9* 30.1* 29.3*    127* 217     CMP:   Recent Labs   Lab 03/27/22  1516 03/28/22  0553 03/29/22  0534    140 142   K 3.6 3.4* 3.8    108 109   CO2 24 23 24    86 97   BUN 16 14 12   CREATININE 0.9 0.8 0.8   CALCIUM 9.6 9.2 9.5   PROT 7.5  --   --    ALBUMIN 3.3*  --   --    BILITOT 0.5  --   --    ALKPHOS 142*  --   --    AST 16  --   --    ALT 11  --   --    ANIONGAP 10 9 9   EGFRNONAA >60.0 >60.0 >60.0     Microbiology Results (last 7 days)       Procedure Component Value Units Date/Time    Clostridium difficile EIA [[id number]] Collected: 03/28/22 1030    Order Status: Completed Specimen: Stool Updated: 03/28/22 2108     C. diff Antigen Negative     C difficile Toxins A+B, EIA Negative     Comment: Testing not recommended for children <24 months old.       Urine culture [[id number]]  (Abnormal) Collected: 03/27/22 1714    Order Status: Completed Specimen: Urine Updated: 03/28/22 1514     Urine Culture, Routine GRAM NEGATIVE JADEN  >100,000 cfu/ml  Identification and susceptibility pending      Narrative:      Specimen Source->Urine          Pathology Results  (Last 10 years)      None          Urine Culture:   Recent Labs   Lab 02/02/22  0915 02/15/22  0920 03/01/22  1116 03/14/22  1452 03/27/22  1714   LABURIN KLEBSIELLA PNEUMONIAE ESBL  >100,000 cfu/ml  * Multiple organisms isolated. None in predominance.  Repeat if  clinically necessary. HAFNIA ALVEI  >100,000 cfu/ml  * Multiple organisms isolated. None in predominance.  Repeat if  clinically necessary. GRAM NEGATIVE JADEN  >100,000 cfu/ml  Identification and susceptibility pending  *     Urine Studies:   Recent Labs   Lab 02/15/22  0920 02/23/22  0919 03/18/22  0950 03/27/22  1714   COLORU  --    < > Yellow Rosalva   APPEARANCEUA  --    < >  --  Hazy*   PHUR  --    < > 5 5.0    SPECGRAV  --    < > 1.025 1.015   PROTEINUA  --    < >  --  Negative   GLUCUA  --    < >  --  Negative   KETONESU  --    < > neg Negative   BILIRUBINUA  --    < >  --  Negative   OCCULTUA  --    < >  --  1+*   NITRITE  --    < > neg Positive*   UROBILINOGEN  --    < > noirmal  --    LEUKOCYTESUR  --    < >  --  Trace*   RBCUA 7*   < >  --  5*   WBCUA 8*   < >  --  25*   BACTERIA Few*   < >  --  Many*   SQUAMEPITHEL 4   < >  --  2   HYALINECASTS 0  --   --   --     < > = values in this interval not displayed.     Wound Culture: No results for input(s): LABAERO in the last 4320 hours.  Recent Lab Results         03/29/22  0534        Anion Gap 9       Baso # 0.03       Basophil % 0.8       BUN 12       Calcium 9.5       Chloride 109       CO2 24       Creatinine 0.8       Differential Method Automated       eGFR if  >60.0       eGFR if non  >60.0  Comment: Calculation used to obtain the estimated glomerular filtration  rate (eGFR) is the CKD-EPI equation.          Eos # 0.1       Eosinophil % 3.0       Glucose 97       Gran # (ANC) 1.9       Gran % 51.2       Hematocrit 29.3       Hemoglobin 9.0       Immature Grans (Abs) 0.00  Comment: Mild elevation in immature granulocytes is non specific and   can be seen in a variety of conditions including stress response,   acute inflammation, trauma and pregnancy. Correlation with other   laboratory and clinical findings is essential.         Immature Granulocytes 0.0       Lymph # 1.2       Lymph % 32.3       MCH 26.0       MCHC 30.7       MCV 85       Mono # 0.5       Mono % 12.7       MPV 10.5       nRBC 0       Platelets 217       Potassium 3.8       RBC 3.46       RDW 13.9       Sodium 142       WBC 3.62               Significant Imaging:     US Abdomen Limited [[id number]] Resulted: 03/27/22 1827   Order Status: Completed Updated: 03/27/22 1830   Narrative:     EXAMINATION:   US ABDOMEN LIMITED     CLINICAL HISTORY:   RUQ, s/p leslie, gets  bile duct stones;.     TECHNIQUE:   Limited ultrasound of the right upper quadrant of the abdomen including pancreas, liver, gallbladder, common bile duct was performed.     COMPARISON:   CT abdomen pelvis 03/27/2022, MRI abdomen 03/02/2022     FINDINGS:   Liver: Normal in size, measuring 14.3 cm with homogeneous echotexture.  1.9 x 2.0 x 1.5 heterogeneous echogenic lesion in the right hepatic lobe in keeping with known hepatic metastatic disease and better characterized on above comparison MRI abdomen.     Gallbladder: The gallbladder is surgically absent.     Biliary system: The common duct is not dilated, measuring 6 mm.  Mild central intrahepatic biliary dilatation.     IVC: Visualized portions appear within normal limits.     Spleen: Normal in size with a homogeneous echotexture, measuring 7.5 x 3.1 cm.     Pancreas: The visualized portions of pancreas appear normal.     Miscellaneous: No ascites.    Impression:       Mildly dilated central intrahepatic bile ducts.  Common bile duct is normal caliber status post cholecystectomy.  No choledocholithiasis.     Right hepatic lobe lesion in keeping with known hepatic metastatic disease, better characterized on above comparison MRI.     Electronically signed by resident: Lon Hanson MD   Date: 03/27/2022   Time: 18:12     Electronically signed by: Gen Ray MD   Date: 03/27/2022   Time: 18:27   CT Abdomen Pelvis Without Contrast [[id number]] Resulted: 03/27/22 1556   Order Status: Completed Updated: 03/27/22 1559   Narrative:     EXAMINATION:   CT ABDOMEN PELVIS WITHOUT CONTRAST     CLINICAL HISTORY:   Abdominal pain, post-op;     TECHNIQUE:   Low dose axial images, sagittal and coronal reformations were obtained from the lung bases to the pubic symphysis.  Oral contrast was not administered.     COMPARISON:   03/11/2022     FINDINGS:   The lung bases are clear.     No pericardial effusion.     The noncontrast appearance of the liver demonstrates areas of  parenchymal calcifications in the left hepatic lobe, unchanged.     The biliary ducts do not appear dilated.  The common bile duct measures about 6 mm.     No radiopaque stones seen within the common bile duct.     Cholecystectomy clips seen.     The stomach appears normal.  The pancreas appears normal.     The spleen, bilateral adrenal glands appear normal.     There are bilateral sizable forming kidney calculi.  There is no hydronephrosis or hydroureter, no stones seen within the bilateral ureters or within the bladder.     The abdominal aorta tapers normally, no para-aortic lymphadenopathy.     Mild stool retention.  Prior partial colectomy.  No bowel dilation.  No inflammatory changes of the bowel seen.     5.0 x 2.5 cm partially calcified soft tissue mass in the right mid anterior abdomen region, not significantly changed.     Note that the patency of the mesenteric vasculature cannot be assessed without the use of IV contrast.     The bladder is nondistended.  The uterus is removed.  The left ovary is not seen, the right ovary appears normal.     No free fluid, no free air.     The inguinal regions appear normal.     The osseous structures demonstrate degenerative changes at the bilateral sacroiliac joints.  No osseous lesions.    Impression:       No definite abnormality seen to explain patient's severe pain after ERCP.     Cholecystectomy.     Bilateral numerous nonobstructive kidney calculi     Prior partial colectomy.     Unchanged soft tissue mass within the right lower abdominal quadrant.     Note that the patency of the mesenteric vasculature cannot be assessed on noncontrast images.       Electronically signed by: Renetta Snow MD   Date: 03/27/2022   Time: 15:56

## 2022-03-29 NOTE — SUBJECTIVE & OBJECTIVE
Interval History: Pt seen at bedside complaining of burnign and pain with administration of IV ertapenem, switch back to meropenem. Appreciate ID help.   C diff is negative, start imodium and probiotic   Follow ucx    Review of Systems   Constitutional:  Positive for appetite change. Negative for fever.   Respiratory:  Negative for shortness of breath.    Cardiovascular:  Negative for chest pain.   Gastrointestinal:  Positive for abdominal pain, diarrhea and nausea. Negative for abdominal distention and vomiting.   Genitourinary:  Positive for dysuria and frequency. Negative for difficulty urinating.   Musculoskeletal:  Negative for arthralgias.   Skin:  Negative for color change.   Neurological:  Negative for speech difficulty and light-headedness.   Psychiatric/Behavioral:  Negative for confusion.    Objective:     Vital Signs (Most Recent):  Temp: 98.7 °F (37.1 °C) (03/29/22 0723)  Pulse: 63 (03/29/22 0741)  Resp: 18 (03/29/22 1042)  BP: (!) 161/70 (03/29/22 0723)  SpO2: 95 % (03/29/22 0723)   Vital Signs (24h Range):  Temp:  [97.9 °F (36.6 °C)-98.7 °F (37.1 °C)] 98.7 °F (37.1 °C)  Pulse:  [56-71] 63  Resp:  [18-20] 18  SpO2:  [91 %-98 %] 95 %  BP: (142-181)/(70-78) 161/70     Weight: 78.4 kg (172 lb 13.5 oz)  Body mass index is 27.9 kg/m².    Intake/Output Summary (Last 24 hours) at 3/29/2022 1053  Last data filed at 3/29/2022 0600  Gross per 24 hour   Intake 320 ml   Output 800 ml   Net -480 ml      Physical Exam  Vitals and nursing note reviewed.   Constitutional:       General: She is not in acute distress.     Appearance: She is well-developed. She is obese.   HENT:      Head: Normocephalic and atraumatic.   Eyes:      Extraocular Movements: Extraocular movements intact.   Cardiovascular:      Rate and Rhythm: Normal rate and regular rhythm.   Pulmonary:      Effort: Pulmonary effort is normal. No respiratory distress.   Abdominal:      General: Abdomen is flat. Bowel sounds are normal. There is no  distension.      Palpations: Abdomen is soft.      Tenderness: There is abdominal tenderness (RLQ).   Musculoskeletal:         General: Normal range of motion.      Right lower leg: No edema.      Left lower leg: No edema.   Skin:     General: Skin is warm and dry.   Neurological:      General: No focal deficit present.      Mental Status: She is alert.   Psychiatric:         Mood and Affect: Mood normal.         Behavior: Behavior normal.       Significant Labs: All pertinent labs within the past 24 hours have been reviewed.    Significant Imaging: I have reviewed all pertinent imaging results/findings within the past 24 hours.

## 2022-03-29 NOTE — ASSESSMENT & PLAN NOTE
71 year old female with history of HTN, HFpEF, carcinoid tumor of abdomen, chronic bilateral renal stones,  recurrent UTIs followed by urology, prior cholecystectomy, choledocholithiasis with recent ERCP/biliary sphincterotomy on 3/4 who presents to [facility] [place] with complaints of abdominal pain, diarrhea and dysuria.     Afebrile and VSS. No leukocytosis. C diff negative. UA positive for pyuria and urine cx showing a GNR. RUQ US and CT abd/pelvis without acute abnormalities. No choledocholithiasis. No hydronephrosis. Non obstucting renal stones present and stable. Patient started on a carbapenem given hx of MDR UTIs.      If urine culture results as ESBL Klebsiella again, it is possible her non obstructing renal stones are colonized. Reviewed last urology note (Dr. Izaguirre) who did not recommend treatment of stones given chronicity, metastatic NET, multiple medical problems, etc.  Would only treat if obstructing/symptomatic.      Recommendations  · Continue Meropenem  · Follow up urine culture to guide antibiotics  · Pain management/diarrhea management per primary team  · Discussed plan with ID staff. ID will follow.

## 2022-03-29 NOTE — PROGRESS NOTES
[facility] - Telemetry [facility] Medicine  Progress Note    Patient Name: Patito Domingo  MRN: [id number]  Patient Class: OP- Observation   Admission Date: 3/27/2022  Length of Stay: 0 days  Attending Physician: Irma Metcalf MD  Primary Care Provider: Yasir Myers Jr, MD        Subjective:     Principal Problem:Urinary tract infection without hematuria        HPI:  Patito Domingo is a 71 y.o. female with a PMHx of HTN, HLD, HFpEF, carcinoid tumor of midgut, s/p CCY with recent ERCP 2/2 retained stones who presents to [facility] [place] with complaints of abdominal pain. Patient reports intermittent RLQ abdominal pain with associated nausea, dry heaving, and decreased PO intake since her recent ERCP 3 weeks ago. She has difficulty describing characteristics of abdominal pain but says she thinks pain worsens after eating. She also reports several episodes of diffuse watery diarrhea for the last few days, says she had 6 episodes prior to arrival to the ED today. She took imodium 2 days ago without improvement. She denies dark/bloody stools but says her stool appears grey colored. Patient also complaints of urinary frequency, dysuria, and feeling hot/cold recently. Denies fever, chills, diaphoresis, vomiting, fever, cough, SOB, chest pain, dizziness, HA, or syncope.     ED: AFVSS. No leukocytosis. RUQ US and CT abd/pelvis without acute abnormalities. UA consistent with infection. Most recent Ucx grew Klebsiella and Hafnia resistant to multiple organisms. Given IV meropenem based on recent Ucx sensitivities.       Overview/Hospital Course:  Patient admitted for UTI. Afebrile without leukocytosis. CT AP unremarkable. UA +. Started on IV meropenem based off of prior Ucx. Ucx here showing GNR. ID consulted and switched to ertapenem but patient states this burned her arm so meropenem resumed. Also reported some diarrhea, c diff negative. Will start probiotic      Interval History: Pt seen at bedside complaining of  burnign and pain with administration of IV ertapenem, switch back to meropenem. Appreciate ID help.   C diff is negative, start imodium and probiotic   Follow ucx    Review of Systems   Constitutional:  Positive for appetite change. Negative for fever.   Respiratory:  Negative for shortness of breath.    Cardiovascular:  Negative for chest pain.   Gastrointestinal:  Positive for abdominal pain, diarrhea and nausea. Negative for abdominal distention and vomiting.   Genitourinary:  Positive for dysuria and frequency. Negative for difficulty urinating.   Musculoskeletal:  Negative for arthralgias.   Skin:  Negative for color change.   Neurological:  Negative for speech difficulty and light-headedness.   Psychiatric/Behavioral:  Negative for confusion.    Objective:     Vital Signs (Most Recent):  Temp: 98.7 °F (37.1 °C) (03/29/22 0723)  Pulse: 63 (03/29/22 0741)  Resp: 18 (03/29/22 1042)  BP: (!) 161/70 (03/29/22 0723)  SpO2: 95 % (03/29/22 0723)   Vital Signs (24h Range):  Temp:  [97.9 °F (36.6 °C)-98.7 °F (37.1 °C)] 98.7 °F (37.1 °C)  Pulse:  [56-71] 63  Resp:  [18-20] 18  SpO2:  [91 %-98 %] 95 %  BP: (142-181)/(70-78) 161/70     Weight: 78.4 kg (172 lb 13.5 oz)  Body mass index is 27.9 kg/m².    Intake/Output Summary (Last 24 hours) at 3/29/2022 1053  Last data filed at 3/29/2022 0600  Gross per 24 hour   Intake 320 ml   Output 800 ml   Net -480 ml      Physical Exam  Vitals and nursing note reviewed.   Constitutional:       General: She is not in acute distress.     Appearance: She is well-developed. She is obese.   HENT:      Head: Normocephalic and atraumatic.   Eyes:      Extraocular Movements: Extraocular movements intact.   Cardiovascular:      Rate and Rhythm: Normal rate and regular rhythm.   Pulmonary:      Effort: Pulmonary effort is normal. No respiratory distress.   Abdominal:      General: Abdomen is flat. Bowel sounds are normal. There is no distension.      Palpations: Abdomen is soft.       Tenderness: There is abdominal tenderness (RLQ).   Musculoskeletal:         General: Normal range of motion.      Right lower leg: No edema.      Left lower leg: No edema.   Skin:     General: Skin is warm and dry.   Neurological:      General: No focal deficit present.      Mental Status: She is alert.   Psychiatric:         Mood and Affect: Mood normal.         Behavior: Behavior normal.       Significant Labs: All pertinent labs within the past 24 hours have been reviewed.    Significant Imaging: I have reviewed all pertinent imaging results/findings within the past 24 hours.      Assessment/Plan:      * Urinary tract infection without hematuria  History of ESBL E. coli infection  Multiple drug resistant organism (MDRO) culture positive  - hx recurrent MDRO UTIs including ESBL   - afebrile without leukocytosis  - UA infectious, + dysuria  - given IV meropenem in the ED based on prior UCx sensitivities, will continue  - ID consulted for further antibiotic clearance- switched to ertapenem however patient had pain with administration so switched back to merropenem  - follow UCx- gnr  - gentle IVFs overnight  - start probiotic    Generalized abdominal pain  - suspect largely due to abdominal carcinoid tumor and UTI contributing, though some c/f c.diff given multiple episodes of diffuse diarrhea   - LFTs and lipase WNL  - RUQ US and CT abd/pelvis without etiology of abd pain  - c.dif pending  - continue home pain meds  - PRN anti-emetics   - add famotidine and reglan     Malignant carcinoid tumor of midgut  - likely cause/ contributing to abdominal pain  - follows with heme/onc as outpatient  - continue home PRN oxycodone    Chronic diastolic heart failure with preserved ejection fraction  - appears dry vs euvolemic  - monitor volume status s/p IVFs  - continue ARB, BB  - strict I/Os    Vertigo  - continue PRN meclizine    HLD (hyperlipidemia)  - continue statin     Essential hypertension  - continue amlodipine 2.5mg  daily, losartan 100mg daily and MTP 25mg BID      VTE Risk Mitigation (From admission, onward)         Ordered     enoxaparin injection 40 mg  Daily         03/27/22 2250     IP VTE HIGH RISK PATIENT  Once         03/27/22 2250     Place sequential compression device  Until discontinued         03/27/22 2240                Discharge Planning   JULIO C: 3/31/2022     Code Status: Full Code   Is the patient medically ready for discharge?: No    Reason for patient still in hospital (select all that apply): Treatment and Consult recommendations  Discharge Plan A: Home                  Mindy Brizuela PA-C  Department of Hospital Medicine   [facility] - Telemetry Stepdown

## 2022-03-30 LAB
ANION GAP SERPL CALC-SCNC: 7 MMOL/L (ref 8–16)
BASOPHILS # BLD AUTO: 0.03 K/UL (ref 0–0.2)
BASOPHILS NFR BLD: 0.7 % (ref 0–1.9)
BUN SERPL-MCNC: 14 MG/DL (ref 8–23)
CALCIUM SERPL-MCNC: 9.4 MG/DL (ref 8.7–10.5)
CHLORIDE SERPL-SCNC: 109 MMOL/L (ref 95–110)
CO2 SERPL-SCNC: 24 MMOL/L (ref 23–29)
CREAT SERPL-MCNC: 0.8 MG/DL (ref 0.5–1.4)
DIFFERENTIAL METHOD: ABNORMAL
EOSINOPHIL # BLD AUTO: 0.1 K/UL (ref 0–0.5)
EOSINOPHIL NFR BLD: 3.2 % (ref 0–8)
ERYTHROCYTE [DISTWIDTH] IN BLOOD BY AUTOMATED COUNT: 13.8 % (ref 11.5–14.5)
EST. GFR  (AFRICAN AMERICAN): >60 ML/MIN/1.73 M^2
EST. GFR  (NON AFRICAN AMERICAN): >60 ML/MIN/1.73 M^2
GLUCOSE SERPL-MCNC: 91 MG/DL (ref 70–110)
HCT VFR BLD AUTO: 29.4 % (ref 37–48.5)
HGB BLD-MCNC: 9 G/DL (ref 12–16)
IMM GRANULOCYTES # BLD AUTO: 0.01 K/UL (ref 0–0.04)
IMM GRANULOCYTES NFR BLD AUTO: 0.2 % (ref 0–0.5)
LYMPHOCYTES # BLD AUTO: 1.4 K/UL (ref 1–4.8)
LYMPHOCYTES NFR BLD: 34.9 % (ref 18–48)
MCH RBC QN AUTO: 25.6 PG (ref 27–31)
MCHC RBC AUTO-ENTMCNC: 30.6 G/DL (ref 32–36)
MCV RBC AUTO: 84 FL (ref 82–98)
MONOCYTES # BLD AUTO: 0.5 K/UL (ref 0.3–1)
MONOCYTES NFR BLD: 12.4 % (ref 4–15)
NEUTROPHILS # BLD AUTO: 2 K/UL (ref 1.8–7.7)
NEUTROPHILS NFR BLD: 48.6 % (ref 38–73)
NRBC BLD-RTO: 0 /100 WBC
PLATELET # BLD AUTO: 196 K/UL (ref 150–450)
PMV BLD AUTO: 11.3 FL (ref 9.2–12.9)
POTASSIUM SERPL-SCNC: 3.8 MMOL/L (ref 3.5–5.1)
RBC # BLD AUTO: 3.51 M/UL (ref 4–5.4)
SODIUM SERPL-SCNC: 140 MMOL/L (ref 136–145)
WBC # BLD AUTO: 4.04 K/UL (ref 3.9–12.7)

## 2022-03-30 PROCEDURE — 63600175 PHARM REV CODE 636 W HCPCS: Performed by: PHYSICIAN ASSISTANT

## 2022-03-30 PROCEDURE — 99233 SBSQ HOSP IP/OBS HIGH 50: CPT | Mod: ,,, | Performed by: PHYSICIAN ASSISTANT

## 2022-03-30 PROCEDURE — 99233 PR SUBSEQUENT HOSPITAL CARE,LEVL III: ICD-10-PCS | Mod: ,,, | Performed by: PHYSICIAN ASSISTANT

## 2022-03-30 PROCEDURE — 25000003 PHARM REV CODE 250: Performed by: PHYSICIAN ASSISTANT

## 2022-03-30 PROCEDURE — 20600001 HC STEP DOWN PRIVATE ROOM

## 2022-03-30 PROCEDURE — 85025 COMPLETE CBC W/AUTO DIFF WBC: CPT | Performed by: INTERNAL MEDICINE

## 2022-03-30 PROCEDURE — 80048 BASIC METABOLIC PNL TOTAL CA: CPT | Performed by: PHYSICIAN ASSISTANT

## 2022-03-30 PROCEDURE — 36415 COLL VENOUS BLD VENIPUNCTURE: CPT | Performed by: INTERNAL MEDICINE

## 2022-03-30 PROCEDURE — 36415 COLL VENOUS BLD VENIPUNCTURE: CPT | Performed by: PHYSICIAN ASSISTANT

## 2022-03-30 RX ORDER — LOPERAMIDE HYDROCHLORIDE 2 MG/1
2 CAPSULE ORAL 2 TIMES DAILY
Status: DISCONTINUED | OUTPATIENT
Start: 2022-03-30 | End: 2022-03-30

## 2022-03-30 RX ORDER — AMLODIPINE BESYLATE 5 MG/1
5 TABLET ORAL ONCE
Status: COMPLETED | OUTPATIENT
Start: 2022-03-30 | End: 2022-03-30

## 2022-03-30 RX ORDER — LIDOCAINE 50 MG/G
1 PATCH TOPICAL
Status: DISCONTINUED | OUTPATIENT
Start: 2022-03-30 | End: 2022-04-01 | Stop reason: HOSPADM

## 2022-03-30 RX ORDER — AMLODIPINE BESYLATE 5 MG/1
5 TABLET ORAL DAILY
Status: DISCONTINUED | OUTPATIENT
Start: 2022-03-31 | End: 2022-04-01 | Stop reason: HOSPADM

## 2022-03-30 RX ORDER — LOPERAMIDE HYDROCHLORIDE 2 MG/1
4 CAPSULE ORAL 3 TIMES DAILY
Status: DISCONTINUED | OUTPATIENT
Start: 2022-03-30 | End: 2022-04-01 | Stop reason: HOSPADM

## 2022-03-30 RX ADMIN — METHOCARBAMOL 500 MG: 500 TABLET ORAL at 09:03

## 2022-03-30 RX ADMIN — FAMOTIDINE 20 MG: 20 TABLET ORAL at 09:03

## 2022-03-30 RX ADMIN — LIDOCAINE 1 PATCH: 50 PATCH CUTANEOUS at 09:03

## 2022-03-30 RX ADMIN — AMLODIPINE BESYLATE 2.5 MG: 2.5 TABLET ORAL at 09:03

## 2022-03-30 RX ADMIN — Medication 1 CAPSULE: at 09:03

## 2022-03-30 RX ADMIN — CYANOCOBALAMIN TAB 1000 MCG 1000 MCG: 1000 TAB at 09:03

## 2022-03-30 RX ADMIN — ERTAPENEM 1 G: 1 INJECTION INTRAMUSCULAR; INTRAVENOUS at 09:03

## 2022-03-30 RX ADMIN — AMLODIPINE BESYLATE 5 MG: 5 TABLET ORAL at 12:03

## 2022-03-30 RX ADMIN — METOCLOPRAMIDE 5 MG: 5 TABLET ORAL at 06:03

## 2022-03-30 RX ADMIN — LOPERAMIDE HYDROCHLORIDE 4 MG: 2 CAPSULE ORAL at 09:03

## 2022-03-30 RX ADMIN — HYDRALAZINE HYDROCHLORIDE 25 MG: 25 TABLET, FILM COATED ORAL at 08:03

## 2022-03-30 RX ADMIN — LOPERAMIDE HYDROCHLORIDE 2 MG: 2 CAPSULE ORAL at 09:03

## 2022-03-30 RX ADMIN — DICLOFENAC SODIUM 2 G: 10 GEL TOPICAL at 09:03

## 2022-03-30 RX ADMIN — OXYCODONE HYDROCHLORIDE 15 MG: 10 TABLET ORAL at 10:03

## 2022-03-30 RX ADMIN — ATORVASTATIN CALCIUM 40 MG: 20 TABLET, FILM COATED ORAL at 09:03

## 2022-03-30 RX ADMIN — OXYCODONE HYDROCHLORIDE 15 MG: 10 TABLET ORAL at 06:03

## 2022-03-30 RX ADMIN — OXYCODONE HYDROCHLORIDE 15 MG: 10 TABLET ORAL at 03:03

## 2022-03-30 RX ADMIN — METOCLOPRAMIDE 5 MG: 5 TABLET ORAL at 03:03

## 2022-03-30 RX ADMIN — LOPERAMIDE HYDROCHLORIDE 4 MG: 2 CAPSULE ORAL at 12:03

## 2022-03-30 RX ADMIN — LOSARTAN POTASSIUM 100 MG: 50 TABLET, FILM COATED ORAL at 10:03

## 2022-03-30 RX ADMIN — METOCLOPRAMIDE 5 MG: 5 TABLET ORAL at 10:03

## 2022-03-30 RX ADMIN — LOPERAMIDE HYDROCHLORIDE 4 MG: 2 CAPSULE ORAL at 03:03

## 2022-03-30 RX ADMIN — METOPROLOL TARTRATE 25 MG: 25 TABLET, FILM COATED ORAL at 09:03

## 2022-03-30 RX ADMIN — ENOXAPARIN SODIUM 40 MG: 100 INJECTION SUBCUTANEOUS at 06:03

## 2022-03-30 RX ADMIN — OXYCODONE HYDROCHLORIDE 15 MG: 10 TABLET ORAL at 09:03

## 2022-03-30 NOTE — PLAN OF CARE
Problem: Adult Inpatient Plan of Care  Goal: Plan of Care Review  Outcome: Ongoing, Progressing  Goal: Patient-Specific Goal (Individualized)  Outcome: Ongoing, Progressing  Goal: Absence of Hospital-Acquired Illness or Injury  Outcome: Ongoing, Progressing  Goal: Optimal Comfort and Wellbeing  Outcome: Ongoing, Progressing  Goal: Readiness for Transition of Care  Outcome: Ongoing, Progressing     Problem: Infection  Goal: Absence of Infection Signs and Symptoms  Outcome: Ongoing, Progressing     Problem: Fluid and Electrolyte Imbalance (Acute Kidney Injury/Impairment)  Goal: Fluid and Electrolyte Balance  Outcome: Ongoing, Progressing     Problem: Oral Intake Inadequate (Acute Kidney Injury/Impairment)  Goal: Optimal Nutrition Intake  Outcome: Ongoing, Progressing     Problem: Renal Function Impairment (Acute Kidney Injury/Impairment)  Goal: Effective Renal Function  Outcome: Ongoing, Progressing   Patient is alert, oriented and conscious. Vital signs taken and recorded. SPO2 maintain in RA. Medication given as per order. Intake output recorded. Mobilization done in room. Will continue to monitor.

## 2022-03-30 NOTE — PLAN OF CARE
[facility] - Telemetry Stepdown  Discharge Reassessment    Primary Care Provider: Yasir Myers Jr, MD    Expected Discharge Date: 3/31/2022    Reassessment (most recent)     Discharge Reassessment - 03/30/22 1701        Discharge Reassessment    Assessment Type Discharge Planning Reassessment     Did the patient's condition or plan change since previous assessment? No     Discharge Plan discussed with: Patient     Communicated JULIO C with patient/caregiver Yes     Discharge Plan A Skilled Nursing Facility     Discharge Plan B Home Health   & home infusion    DME Needed Upon Discharge  none        Post-Acute Status    Post-Acute Authorization IV Infusion     IV Infusion Status Referral(s) sent     Discharge Delays None known at this time               Met with patient at bedside. Patient reports she does not think she can administer her own IV antibiotics at dc. Patient states she lives alone and has 17 steps to enter her apartment. Patient also reports pain in her legs recently. SW inquiring to MD if PT/OT is able to evaluate patient for potential SNF needs.    Mer Coleman, CLIFFORD  [facility]  Ext. [phone]

## 2022-03-30 NOTE — PROGRESS NOTES
[facility] - Telemetry Stepdown  Infectious Disease  Progress Note    Patient Name: Patito Domingo  MRN: [id number]  Admission Date: 3/27/2022  Length of Stay: 1 days  Attending Physician: Irma Metcalf MD  Primary Care Provider: Yasir Myers Jr, MD    Isolation Status: No active isolations  Assessment/Plan:       Multiple drug resistant organism (MDRO) culture positive     See assessment and plan below    Nephrolithiasis     See assessment and plan below    Urinary tract infection without hematuria     71 year old female with history of HTN, HFpEF, carcinoid tumor of abdomen, chronic bilateral renal stones,  recurrent UTIs followed by urology, prior cholecystectomy, choledocholithiasis with recent ERCP/biliary sphincterotomy on 3/4 who presents to [facility] with complaints of abdominal pain, diarrhea and dysuria.     Afebrile and VSS. No leukocytosis. C diff negative. UA positive for pyuria and urine cx is positive for ESBL Klebsiella pneumoniae (S carbapenems only).  RUQ US and CT abd/pelvis without acute abnormalities. No choledocholithiasis. No hydronephrosis. Non obstucting renal stones present and stable. Patient is on Ertapenem. Reports improved dysuria though does not feel she is tolerating Ertapenem as she did Meropenem.       Patient has had multiple recurrences of ESBL Klebsiella UTIs. Suspect her non obstructing renal stones may be colonized and predisposing her to recurrent infections. Will plan to treat as a complicated UTI.           Outpatient Antibiotic Therapy Plan:    Please send referral to [facility].    1) Infection: complicated ESBL klebsiella UTI    2) Discharge Antibiotics:    Intravenous antibiotics:   Meropenem 2 g IV q 12 hours    3) Therapy Duration:  14 days    Estimated end date of IV antibiotics: 4/10/22    4) Outpatient Weekly Labs:    Order the following labs to be drawn on Mondays:    CBC   CMP     5) Fax Lab Results to Infectious Diseases  Provider: Hien Dong    [facility] Fax Number: [phone]    6) Outpatient Follow-ups     Follow-up appointment will be arranged by the ID clinic and will be found in the patient's appointments tab. Prior to discharge, please ensure the patient's follow-up has been scheduled.  If there is still no follow-up scheduled prior to discharge, please send an EPIC message to Laura Ramos in Infectious Diseases.     Recommend patient follow up with her urologist after discharge to discuss potential management of her chronic renal stones as possible nidus of infection.     Discussed plan with ID staff and hospital medicine. ID will sign off.          Please call  or secure chat for any questions. Thank you.  Hien Dong PA-C  ID [id number] Spectra: [id number]      Subjective:     Principal Problem:Urinary tract infection without hematuria    HPI: Patito Domingo is a 71 y.o. 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    Patient reports RLQ abdominal pain with associated nausea, decreased PO intake since her recent ERCP. She also reports developing several episodes of diffuse watery diarrhea for which she has been seen in the ED a few times. In addition, she endorses urinary frequency, dysuria, and chills.     Afebrile and VSS. No leukocytosis. UA with positive nitrites, trace leuks, 25 WBC. RUQ US and CT abd/pelvis without acute abnormalities. No choledocholithiasis. No hydronephrosis. Non obstucting renal stones present. As most recent Ucx grew  ESBL Klebsiella and Hafnia, patient started on meropenem. Urine cx 3/27 is pending. C diff pending.  ID consulted for antibiotic recommendations. Patient lives alone and cares for  "herself.    Interval History: NAEON. Afebrile. No leukocytosis. Urine cx with ESBL Klebsiella. On Ertapenem. Reports "feeling funny" after her Ertapenem infusion. She feels nauseous, poor taste in mouth and states it increases her pain. Diarrhea improving. Burning with urination improved. Patient lives alone and does not feel comfortable administering IV antibiotics herself.     Review of Systems   Constitutional:  Negative for chills, diaphoresis and fever.   Respiratory:  Negative for cough and shortness of breath.    Cardiovascular:  Negative for chest pain.   Gastrointestinal:  Positive for abdominal pain (right sided - waxes/wanes), diarrhea (improved) and nausea. Negative for abdominal distention, constipation and vomiting.   Genitourinary:  Positive for dysuria. Negative for difficulty urinating, flank pain and hematuria.   Musculoskeletal:  Negative for arthralgias and gait problem.   Skin:  Negative for color change, pallor and rash.   Neurological:  Negative for speech difficulty and headaches.   Psychiatric/Behavioral:  Negative for agitation and confusion. The patient is not nervous/anxious.    Objective:     Vital Signs (Most Recent):  Temp: 98.3 °F (36.8 °C) (03/30/22 0753)  Pulse: 64 (03/30/22 0833)  Resp: 18 (03/30/22 1057)  BP: (!) 180/75 (03/30/22 0753)  SpO2: 97 % (03/30/22 0753)   Vital Signs (24h Range):  Temp:  [98.2 °F (36.8 °C)-98.6 °F (37 °C)] 98.3 °F (36.8 °C)  Pulse:  [59-74] 64  Resp:  [16-18] 18  SpO2:  [93 %-98 %] 97 %  BP: (146-194)/(67-85) 180/75     Weight: 78.4 kg (172 lb 13.5 oz)  Body mass index is 27.9 kg/m².    Estimated Creatinine Clearance: 70.1 mL/min (based on SCr of 0.8 mg/dL).    Physical Exam  Constitutional:       General: She is not in acute distress.     Appearance: She is not toxic-appearing or diaphoretic.   HENT:      Head: Normocephalic and atraumatic.      Nose: Nose normal. No congestion.   Eyes:      General: No scleral icterus.     Conjunctiva/sclera: " Conjunctivae normal.   Cardiovascular:      Rate and Rhythm: Normal rate and regular rhythm.   Pulmonary:      Effort: Pulmonary effort is normal. No respiratory distress.   Abdominal:      General: There is no distension.      Palpations: Abdomen is soft.      Tenderness: There is abdominal tenderness (RUQ).   Genitourinary:     Comments: No suprapubic tenderness  Musculoskeletal:         General: No swelling or tenderness.      Cervical back: Neck supple.   Lymphadenopathy:      Cervical: No cervical adenopathy.   Skin:     General: Skin is warm and dry.      Findings: No rash.   Neurological:      Mental Status: She is alert and oriented to person, place, and time.   Psychiatric:         Mood and Affect: Mood normal.         Behavior: Behavior normal.         Thought Content: Thought content normal.       Significant Labs: Blood Culture:   Recent Labs   Lab 11/04/21  1941 11/04/21 2001 11/20/21  1206 11/20/21  1333 03/05/22  1820   LABBLOO No growth after 5 days. No growth after 5 days. No growth after 5 days. No growth after 5 days. No growth after 5 days.  No growth after 5 days.       CBC:   Recent Labs   Lab 03/29/22  0534 03/30/22  0538   WBC 3.62* 4.04   HGB 9.0* 9.0*   HCT 29.3* 29.4*    196       CMP:   Recent Labs   Lab 03/29/22  0534 03/30/22  0307    140   K 3.8 3.8    109   CO2 24 24   GLU 97 91   BUN 12 14   CREATININE 0.8 0.8   CALCIUM 9.5 9.4   ANIONGAP 9 7*   EGFRNONAA >60.0 >60.0       Microbiology Results (last 7 days)       Procedure Component Value Units Date/Time    Urine culture [[id number]]  (Abnormal)  (Susceptibility) Collected: 03/27/22 1714    Order Status: Completed Specimen: Urine Updated: 03/29/22 1210     Urine Culture, Routine KLEBSIELLA PNEUMONIAE ESBL  >100,000 cfu/ml      Narrative:      Specimen Source->Urine    Clostridium difficile EIA [[id number]] Collected: 03/28/22 1030    Order Status: Completed Specimen: Stool Updated: 03/28/22 2108     C. diff Antigen  Negative     C difficile Toxins A+B, EIA Negative     Comment: Testing not recommended for children <24 months old.             Pathology Results  (Last 10 years)      None          Urine Culture:   Recent Labs   Lab 02/02/22  0915 02/15/22  0920 03/01/22  1116 03/14/22  1452 03/27/22  1714   LABURIN KLEBSIELLA PNEUMONIAE ESBL  >100,000 cfu/ml  * Multiple organisms isolated. None in predominance.  Repeat if  clinically necessary. HAFNIA ALVEI  >100,000 cfu/ml  * Multiple organisms isolated. None in predominance.  Repeat if  clinically necessary. KLEBSIELLA PNEUMONIAE ESBL  >100,000 cfu/ml  *       Urine Studies:   Recent Labs   Lab 02/15/22  0920 02/23/22  0919 03/18/22  0950 03/27/22  1714   COLORU  --    < > Yellow Rosalva   APPEARANCEUA  --    < >  --  Hazy*   PHUR  --    < > 5 5.0   SPECGRAV  --    < > 1.025 1.015   PROTEINUA  --    < >  --  Negative   GLUCUA  --    < >  --  Negative   KETONESU  --    < > neg Negative   BILIRUBINUA  --    < >  --  Negative   OCCULTUA  --    < >  --  1+*   NITRITE  --    < > neg Positive*   UROBILINOGEN  --    < > noirmal  --    LEUKOCYTESUR  --    < >  --  Trace*   RBCUA 7*   < >  --  5*   WBCUA 8*   < >  --  25*   BACTERIA Few*   < >  --  Many*   SQUAMEPITHEL 4   < >  --  2   HYALINECASTS 0  --   --   --     < > = values in this interval not displayed.       Wound Culture: No results for input(s): LABAERO in the last 4320 hours.  Recent Lab Results         03/30/22  0538   03/30/22  0307        Anion Gap   7       Baso # 0.03         Basophil % 0.7         BUN   14       Calcium   9.4       Chloride   109       CO2   24       Creatinine   0.8       Differential Method Automated         eGFR if    >60.0       eGFR if non    >60.0  Comment: Calculation used to obtain the estimated glomerular filtration  rate (eGFR) is the CKD-EPI equation.          Eos # 0.1         Eosinophil % 3.2         Glucose   91       Gran # (ANC) 2.0         Gran % 48.6          Hematocrit 29.4         Hemoglobin 9.0         Immature Grans (Abs) 0.01  Comment: Mild elevation in immature granulocytes is non specific and   can be seen in a variety of conditions including stress response,   acute inflammation, trauma and pregnancy. Correlation with other   laboratory and clinical findings is essential.           Immature Granulocytes 0.2         Lymph # 1.4         Lymph % 34.9         MCH 25.6         MCHC 30.6         MCV 84         Mono # 0.5         Mono % 12.4         MPV 11.3         nRBC 0         Platelets 196         Potassium   3.8       RBC 3.51         RDW 13.8         Sodium   140       WBC 4.04                 Significant Imaging:     US Abdomen Limited [[id number]] Resulted: 03/27/22 1827   Order Status: Completed Updated: 03/27/22 1830   Narrative:     EXAMINATION:   US ABDOMEN LIMITED     CLINICAL HISTORY:   RUQ, s/p leslie, gets bile duct stones;.     TECHNIQUE:   Limited ultrasound of the right upper quadrant of the abdomen including pancreas, liver, gallbladder, common bile duct was performed.     COMPARISON:   CT abdomen pelvis 03/27/2022, MRI abdomen 03/02/2022     FINDINGS:   Liver: Normal in size, measuring 14.3 cm with homogeneous echotexture.  1.9 x 2.0 x 1.5 heterogeneous echogenic lesion in the right hepatic lobe in keeping with known hepatic metastatic disease and better characterized on above comparison MRI abdomen.     Gallbladder: The gallbladder is surgically absent.     Biliary system: The common duct is not dilated, measuring 6 mm.  Mild central intrahepatic biliary dilatation.     IVC: Visualized portions appear within normal limits.     Spleen: Normal in size with a homogeneous echotexture, measuring 7.5 x 3.1 cm.     Pancreas: The visualized portions of pancreas appear normal.     Miscellaneous: No ascites.    Impression:       Mildly dilated central intrahepatic bile ducts.  Common bile duct is normal caliber status post cholecystectomy.  No  choledocholithiasis.     Right hepatic lobe lesion in keeping with known hepatic metastatic disease, better characterized on above comparison MRI.     Electronically signed by resident: Lon Hanson MD   Date: 03/27/2022   Time: 18:12     Electronically signed by: Gen Ray MD   Date: 03/27/2022   Time: 18:27   CT Abdomen Pelvis Without Contrast [[id number]] Resulted: 03/27/22 1556   Order Status: Completed Updated: 03/27/22 1559   Narrative:     EXAMINATION:   CT ABDOMEN PELVIS WITHOUT CONTRAST     CLINICAL HISTORY:   Abdominal pain, post-op;     TECHNIQUE:   Low dose axial images, sagittal and coronal reformations were obtained from the lung bases to the pubic symphysis.  Oral contrast was not administered.     COMPARISON:   03/11/2022     FINDINGS:   The lung bases are clear.     No pericardial effusion.     The noncontrast appearance of the liver demonstrates areas of parenchymal calcifications in the left hepatic lobe, unchanged.     The biliary ducts do not appear dilated.  The common bile duct measures about 6 mm.     No radiopaque stones seen within the common bile duct.     Cholecystectomy clips seen.     The stomach appears normal.  The pancreas appears normal.     The spleen, bilateral adrenal glands appear normal.     There are bilateral sizable forming kidney calculi.  There is no hydronephrosis or hydroureter, no stones seen within the bilateral ureters or within the bladder.     The abdominal aorta tapers normally, no para-aortic lymphadenopathy.     Mild stool retention.  Prior partial colectomy.  No bowel dilation.  No inflammatory changes of the bowel seen.     5.0 x 2.5 cm partially calcified soft tissue mass in the right mid anterior abdomen region, not significantly changed.     Note that the patency of the mesenteric vasculature cannot be assessed without the use of IV contrast.     The bladder is nondistended.  The uterus is removed.  The left ovary is not seen, the right ovary appears  normal.     No free fluid, no free air.     The inguinal regions appear normal.     The osseous structures demonstrate degenerative changes at the bilateral sacroiliac joints.  No osseous lesions.    Impression:       No definite abnormality seen to explain patient's severe pain after ERCP.     Cholecystectomy.     Bilateral numerous nonobstructive kidney calculi     Prior partial colectomy.     Unchanged soft tissue mass within the right lower abdominal quadrant.     Note that the patency of the mesenteric vasculature cannot be assessed on noncontrast images.       Electronically signed by: Renetta Snow MD   Date: 03/27/2022   Time: 15:56

## 2022-03-30 NOTE — SUBJECTIVE & OBJECTIVE
Interval History: Patient says she did not sleep well overnight, had 2 episodes of diarrhea. Switched imodium from prn to scheduled. Still some abd pain but improved. Will follow up ID recs, likely needs home abx    Review of Systems   Constitutional:  Positive for appetite change. Negative for fever.   Respiratory:  Negative for shortness of breath.    Cardiovascular:  Negative for chest pain.   Gastrointestinal:  Positive for abdominal pain, diarrhea and nausea. Negative for abdominal distention and vomiting.   Genitourinary:  Positive for dysuria and frequency. Negative for difficulty urinating.   Musculoskeletal:  Negative for arthralgias.   Skin:  Negative for color change.   Neurological:  Negative for speech difficulty and light-headedness.   Psychiatric/Behavioral:  Negative for confusion.    Objective:     Vital Signs (Most Recent):  Temp: 98.3 °F (36.8 °C) (03/30/22 0753)  Pulse: 64 (03/30/22 0833)  Resp: 18 (03/30/22 0753)  BP: (!) 180/75 (03/30/22 0753)  SpO2: 97 % (03/30/22 0753)   Vital Signs (24h Range):  Temp:  [98.2 °F (36.8 °C)-98.6 °F (37 °C)] 98.3 °F (36.8 °C)  Pulse:  [59-74] 64  Resp:  [16-18] 18  SpO2:  [93 %-98 %] 97 %  BP: (146-194)/(67-85) 180/75     Weight: 78.4 kg (172 lb 13.5 oz)  Body mass index is 27.9 kg/m².    Intake/Output Summary (Last 24 hours) at 3/30/2022 0958  Last data filed at 3/30/2022 0600  Gross per 24 hour   Intake 320 ml   Output --   Net 320 ml      Physical Exam  Vitals and nursing note reviewed.   Constitutional:       General: She is not in acute distress.     Appearance: She is well-developed. She is obese.   HENT:      Head: Normocephalic and atraumatic.   Eyes:      Extraocular Movements: Extraocular movements intact.   Cardiovascular:      Rate and Rhythm: Normal rate and regular rhythm.   Pulmonary:      Effort: Pulmonary effort is normal. No respiratory distress.   Abdominal:      General: Abdomen is flat. Bowel sounds are normal. There is no distension.       Palpations: Abdomen is soft.      Tenderness: There is abdominal tenderness (RLQ).   Musculoskeletal:         General: Normal range of motion.      Right lower leg: No edema.      Left lower leg: No edema.   Skin:     General: Skin is warm and dry.   Neurological:      General: No focal deficit present.      Mental Status: She is alert.   Psychiatric:         Mood and Affect: Mood normal.         Behavior: Behavior normal.       Significant Labs: All pertinent labs within the past 24 hours have been reviewed.    Significant Imaging: I have reviewed all pertinent imaging results/findings within the past 24 hours.

## 2022-03-30 NOTE — CONSULTS
[facility] consulted to place midline for IV ertapenem x 14 days.  Patient refused placement x 2.  PA messaged.  Reconsult [facility] when patient is ready for midline placement.

## 2022-03-30 NOTE — ASSESSMENT & PLAN NOTE
History of ESBL E. coli infection  Multiple drug resistant organism (MDRO) culture positive  - hx recurrent MDRO UTIs including ESBL   - afebrile without leukocytosis  - UA infectious, + dysuria  - given IV meropenem in the ED based on prior UCx sensitivities, will continue  - ID consulted for further antibiotic clearance- switched to ertapenem however patient had pain with administration so switched back to merropenem  - follow UCx- klebs ESBL, will likely need IV abx going home  - gentle IVFs overnight  - start probiotic

## 2022-03-30 NOTE — PROGRESS NOTES
[facility] - Telemetry [facility] Medicine  Progress Note    Patient Name: Patito Domingo  MRN: [id number]  Patient Class: IP- Inpatient   Admission Date: 3/27/2022  Length of Stay: 1 days  Attending Physician: Irma Metcalf MD  Primary Care Provider: Yasir Myers Jr, MD        Subjective:     Principal Problem:Urinary tract infection without hematuria        HPI:  Patito Domingo is a 71 y.o. female with a PMHx of HTN, HLD, HFpEF, carcinoid tumor of midgut, s/p CCY with recent ERCP 2/2 retained stones who presents to [facility] [place] [place] with complaints of abdominal pain. Patient reports intermittent RLQ abdominal pain with associated nausea, dry heaving, and decreased PO intake since her recent ERCP 3 weeks ago. She has difficulty describing characteristics of abdominal pain but says she thinks pain worsens after eating. She also reports several episodes of diffuse watery diarrhea for the last few days, says she had 6 episodes prior to arrival to the ED today. She took imodium 2 days ago without improvement. She denies dark/bloody stools but says her stool appears grey colored. Patient also complaints of urinary frequency, dysuria, and feeling hot/cold recently. Denies fever, chills, diaphoresis, vomiting, fever, cough, SOB, chest pain, dizziness, HA, or syncope.     ED: AFVSS. No leukocytosis. RUQ US and CT abd/pelvis without acute abnormalities. UA consistent with infection. Most recent Ucx grew Klebsiella and Hafnia resistant to multiple organisms. Given IV meropenem based on recent Ucx sensitivities.       Overview/Hospital Course:  Patient admitted for UTI. Afebrile without leukocytosis. CT AP unremarkable. UA +. Started on IV meropenem based off of prior Ucx. Ucx here showing Klebsiella ESBL. ID consulted and switched to ertapenem as her organism is sensitive to this and it is a 1x day dose. However pt says it burned her arm, will see if patient can tolerate today. Also reported some diarrhea, c diff  negative. Will start probiotic      Interval History: Patient says she did not sleep well overnight, had 2 episodes of diarrhea. Switched imodium from prn to scheduled. Still some abd pain but improved. Will follow up ID recs, likely needs home abx    Review of Systems   Constitutional:  Positive for appetite change. Negative for fever.   Respiratory:  Negative for shortness of breath.    Cardiovascular:  Negative for chest pain.   Gastrointestinal:  Positive for abdominal pain, diarrhea and nausea. Negative for abdominal distention and vomiting.   Genitourinary:  Positive for dysuria and frequency. Negative for difficulty urinating.   Musculoskeletal:  Negative for arthralgias.   Skin:  Negative for color change.   Neurological:  Negative for speech difficulty and light-headedness.   Psychiatric/Behavioral:  Negative for confusion.    Objective:     Vital Signs (Most Recent):  Temp: 98.3 °F (36.8 °C) (03/30/22 0753)  Pulse: 64 (03/30/22 0833)  Resp: 18 (03/30/22 0753)  BP: (!) 180/75 (03/30/22 0753)  SpO2: 97 % (03/30/22 0753)   Vital Signs (24h Range):  Temp:  [98.2 °F (36.8 °C)-98.6 °F (37 °C)] 98.3 °F (36.8 °C)  Pulse:  [59-74] 64  Resp:  [16-18] 18  SpO2:  [93 %-98 %] 97 %  BP: (146-194)/(67-85) 180/75     Weight: 78.4 kg (172 lb 13.5 oz)  Body mass index is 27.9 kg/m².    Intake/Output Summary (Last 24 hours) at 3/30/2022 0958  Last data filed at 3/30/2022 0600  Gross per 24 hour   Intake 320 ml   Output --   Net 320 ml      Physical Exam  Vitals and nursing note reviewed.   Constitutional:       General: She is not in acute distress.     Appearance: She is well-developed. She is obese.   HENT:      Head: Normocephalic and atraumatic.   Eyes:      Extraocular Movements: Extraocular movements intact.   Cardiovascular:      Rate and Rhythm: Normal rate and regular rhythm.   Pulmonary:      Effort: Pulmonary effort is normal. No respiratory distress.   Abdominal:      General: Abdomen is flat. Bowel sounds are  normal. There is no distension.      Palpations: Abdomen is soft.      Tenderness: There is abdominal tenderness (RLQ).   Musculoskeletal:         General: Normal range of motion.      Right lower leg: No edema.      Left lower leg: No edema.   Skin:     General: Skin is warm and dry.   Neurological:      General: No focal deficit present.      Mental Status: She is alert.   Psychiatric:         Mood and Affect: Mood normal.         Behavior: Behavior normal.       Significant Labs: All pertinent labs within the past 24 hours have been reviewed.    Significant Imaging: I have reviewed all pertinent imaging results/findings within the past 24 hours.      Assessment/Plan:      * Urinary tract infection without hematuria  History of ESBL E. coli infection  Multiple drug resistant organism (MDRO) culture positive  - hx recurrent MDRO UTIs including ESBL   - afebrile without leukocytosis  - UA infectious, + dysuria  - given IV meropenem in the ED based on prior UCx sensitivities, will continue  - ID consulted for further antibiotic clearance- switched to ertapenem however patient had pain with administration so switched back to merropenem  - follow UCx- klebs ESBL, will likely need IV abx going home  - gentle IVFs overnight  - start probiotic    Generalized abdominal pain  - suspect largely due to abdominal carcinoid tumor and UTI contributing, though some c/f c.diff given multiple episodes of diffuse diarrhea   - LFTs and lipase WNL  - RUQ US and CT abd/pelvis without etiology of abd pain  - c.dif pending  - continue home pain meds  - PRN anti-emetics   - add famotidine and reglan     Malignant carcinoid tumor of midgut  - likely cause/ contributing to abdominal pain  - follows with heme/onc as outpatient  - continue home PRN oxycodone    Chronic diastolic heart failure with preserved ejection fraction  - appears dry vs euvolemic  - monitor volume status s/p IVFs  - continue ARB, BB  - strict I/Os    Vertigo  - continue  PRN meclizine    HLD (hyperlipidemia)  - continue statin     Essential hypertension  - continue amlodipine 2.5mg daily, losartan 100mg daily and MTP 25mg BID      VTE Risk Mitigation (From admission, onward)         Ordered     enoxaparin injection 40 mg  Daily         03/27/22 2250     IP VTE HIGH RISK PATIENT  Once         03/27/22 2250     Place sequential compression device  Until discontinued         03/27/22 2240                Discharge Planning   JULIO C: 3/31/2022     Code Status: Full Code   Is the patient medically ready for discharge?: No    Reason for patient still in hospital (select all that apply): Treatment, Consult recommendations and Pending disposition  Discharge Plan A: Home                  Mindy Brizuela PA-C  Department of Hospital Medicine   [facility] - Telemetry Stepdown

## 2022-03-30 NOTE — ASSESSMENT & PLAN NOTE
71 year old female with history of HTN, HFpEF, carcinoid tumor of abdomen, chronic bilateral renal stones,  recurrent UTIs followed by urology, prior cholecystectomy, choledocholithiasis with recent ERCP/biliary sphincterotomy on 3/4 who presents to [facility] [place] with complaints of abdominal pain, diarrhea and dysuria.     Afebrile and VSS. No leukocytosis. C diff negative. UA positive for pyuria and urine cx is positive for ESBL Klebsiella pneumoniae (S carbapenems only).  RUQ US and CT abd/pelvis without acute abnormalities. No choledocholithiasis. No hydronephrosis. Non obstucting renal stones present and stable. Patient is on Ertapenem. Reports improved dysuria though does not feel she is tolerating Ertapenem as well as she did Meropenem.       Patient has had multiple recurrences of ESBL Klebsiella UTIs. Suspect her non obstructing renal stones may be colonized and predisposing her to recurrent infections. Will plan to treat as a complicated UTI.           Outpatient Antibiotic Therapy Plan:    Please send referral to [facility].    1) Infection: complicated ESBL klebsiella UTI    2) Discharge Antibiotics:    Intravenous antibiotics:   Meropenem 2 g IV q 12 hours    3) Therapy Duration:  14 days    Estimated end date of IV antibiotics: 4/10/22    4) Outpatient Weekly Labs:    Order the following labs to be drawn on Mondays:    CBC   CMP     5) Fax Lab Results to Infectious Diseases Provider: Hien Dong    [facility] Fax Number: [phone]    6) Outpatient Follow-ups     Follow-up appointment will be arranged by the ID clinic and will be found in the patient's appointments tab. Prior to discharge, please ensure the patient's follow-up has been scheduled.  If there is still no follow-up scheduled prior to discharge, please send an EPIC message to Laura Ramos in Infectious Diseases.     Recommend patient follow up with her urologist after discharge to discuss potential  management of her chronic renal stones as possible nidus of infection.     Discussed plan with ID staff and hospital medicine. ID will sign off.

## 2022-03-30 NOTE — SUBJECTIVE & OBJECTIVE
"Interval History: NAEON. Afebrile. No leukocytosis. Urine cx with ESBL Klebsiella. On Ertapenem. Reports "feeling funny" after her Ertapenem infusion. She feels nauseous, poor taste in mouth and states it increases her pain. Diarrhea improving. Burning with urination improved. Patient lives alone and does not feel comfortable administering IV antibiotics herself.     Review of Systems   Constitutional:  Negative for chills, diaphoresis and fever.   Respiratory:  Negative for cough and shortness of breath.    Cardiovascular:  Negative for chest pain.   Gastrointestinal:  Positive for abdominal pain (right sided - waxes/wanes), diarrhea (improved) and nausea. Negative for abdominal distention, constipation and vomiting.   Genitourinary:  Positive for dysuria. Negative for difficulty urinating, flank pain and hematuria.   Musculoskeletal:  Negative for arthralgias and gait problem.   Skin:  Negative for color change, pallor and rash.   Neurological:  Negative for speech difficulty and headaches.   Psychiatric/Behavioral:  Negative for agitation and confusion. The patient is not nervous/anxious.    Objective:     Vital Signs (Most Recent):  Temp: 98.3 °F (36.8 °C) (03/30/22 0753)  Pulse: 64 (03/30/22 0833)  Resp: 18 (03/30/22 1057)  BP: (!) 180/75 (03/30/22 0753)  SpO2: 97 % (03/30/22 0753)   Vital Signs (24h Range):  Temp:  [98.2 °F (36.8 °C)-98.6 °F (37 °C)] 98.3 °F (36.8 °C)  Pulse:  [59-74] 64  Resp:  [16-18] 18  SpO2:  [93 %-98 %] 97 %  BP: (146-194)/(67-85) 180/75     Weight: 78.4 kg (172 lb 13.5 oz)  Body mass index is 27.9 kg/m².    Estimated Creatinine Clearance: 70.1 mL/min (based on SCr of 0.8 mg/dL).    Physical Exam  Constitutional:       General: She is not in acute distress.     Appearance: She is not toxic-appearing or diaphoretic.   HENT:      Head: Normocephalic and atraumatic.      Nose: Nose normal. No congestion.   Eyes:      General: No scleral icterus.     Conjunctiva/sclera: Conjunctivae normal. "   Cardiovascular:      Rate and Rhythm: Normal rate and regular rhythm.   Pulmonary:      Effort: Pulmonary effort is normal. No respiratory distress.   Abdominal:      General: There is no distension.      Palpations: Abdomen is soft.      Tenderness: There is abdominal tenderness (RUQ).   Genitourinary:     Comments: No suprapubic tenderness  Musculoskeletal:         General: No swelling or tenderness.      Cervical back: Neck supple.   Lymphadenopathy:      Cervical: No cervical adenopathy.   Skin:     General: Skin is warm and dry.      Findings: No rash.   Neurological:      Mental Status: She is alert and oriented to person, place, and time.   Psychiatric:         Mood and Affect: Mood normal.         Behavior: Behavior normal.         Thought Content: Thought content normal.       Significant Labs: Blood Culture:   Recent Labs   Lab 11/04/21  1941 11/04/21 2001 11/20/21  1206 11/20/21  1333 03/05/22  1820   LABBLOO No growth after 5 days. No growth after 5 days. No growth after 5 days. No growth after 5 days. No growth after 5 days.  No growth after 5 days.       CBC:   Recent Labs   Lab 03/29/22  0534 03/30/22  0538   WBC 3.62* 4.04   HGB 9.0* 9.0*   HCT 29.3* 29.4*    196       CMP:   Recent Labs   Lab 03/29/22  0534 03/30/22  0307    140   K 3.8 3.8    109   CO2 24 24   GLU 97 91   BUN 12 14   CREATININE 0.8 0.8   CALCIUM 9.5 9.4   ANIONGAP 9 7*   EGFRNONAA >60.0 >60.0       Microbiology Results (last 7 days)       Procedure Component Value Units Date/Time    Urine culture [[id number]]  (Abnormal)  (Susceptibility) Collected: 03/27/22 1714    Order Status: Completed Specimen: Urine Updated: 03/29/22 1210     Urine Culture, Routine KLEBSIELLA PNEUMONIAE ESBL  >100,000 cfu/ml      Narrative:      Specimen Source->Urine    Clostridium difficile EIA [[id number]] Collected: 03/28/22 1030    Order Status: Completed Specimen: Stool Updated: 03/28/22 2108     C. diff Antigen Negative     C  difficile Toxins A+B, EIA Negative     Comment: Testing not recommended for children <24 months old.             Pathology Results  (Last 10 years)      None          Urine Culture:   Recent Labs   Lab 02/02/22  0915 02/15/22  0920 03/01/22  1116 03/14/22  1452 03/27/22  1714   LABURIN KLEBSIELLA PNEUMONIAE ESBL  >100,000 cfu/ml  * Multiple organisms isolated. None in predominance.  Repeat if  clinically necessary. HAFNIA ALVEI  >100,000 cfu/ml  * Multiple organisms isolated. None in predominance.  Repeat if  clinically necessary. KLEBSIELLA PNEUMONIAE ESBL  >100,000 cfu/ml  *       Urine Studies:   Recent Labs   Lab 02/15/22  0920 02/23/22  0919 03/18/22  0950 03/27/22  1714   COLORU  --    < > Yellow Rosalva   APPEARANCEUA  --    < >  --  Hazy*   PHUR  --    < > 5 5.0   SPECGRAV  --    < > 1.025 1.015   PROTEINUA  --    < >  --  Negative   GLUCUA  --    < >  --  Negative   KETONESU  --    < > neg Negative   BILIRUBINUA  --    < >  --  Negative   OCCULTUA  --    < >  --  1+*   NITRITE  --    < > neg Positive*   UROBILINOGEN  --    < > noirmal  --    LEUKOCYTESUR  --    < >  --  Trace*   RBCUA 7*   < >  --  5*   WBCUA 8*   < >  --  25*   BACTERIA Few*   < >  --  Many*   SQUAMEPITHEL 4   < >  --  2   HYALINECASTS 0  --   --   --     < > = values in this interval not displayed.       Wound Culture: No results for input(s): LABAERO in the last 4320 hours.  Recent Lab Results         03/30/22  0538   03/30/22  0307        Anion Gap   7       Baso # 0.03         Basophil % 0.7         BUN   14       Calcium   9.4       Chloride   109       CO2   24       Creatinine   0.8       Differential Method Automated         eGFR if    >60.0       eGFR if non    >60.0  Comment: Calculation used to obtain the estimated glomerular filtration  rate (eGFR) is the CKD-EPI equation.          Eos # 0.1         Eosinophil % 3.2         Glucose   91       Gran # (ANC) 2.0         Gran % 48.6          Hematocrit 29.4         Hemoglobin 9.0         Immature Grans (Abs) 0.01  Comment: Mild elevation in immature granulocytes is non specific and   can be seen in a variety of conditions including stress response,   acute inflammation, trauma and pregnancy. Correlation with other   laboratory and clinical findings is essential.           Immature Granulocytes 0.2         Lymph # 1.4         Lymph % 34.9         MCH 25.6         MCHC 30.6         MCV 84         Mono # 0.5         Mono % 12.4         MPV 11.3         nRBC 0         Platelets 196         Potassium   3.8       RBC 3.51         RDW 13.8         Sodium   140       WBC 4.04                 Significant Imaging:     US Abdomen Limited [[id number]] Resulted: 03/27/22 1827   Order Status: Completed Updated: 03/27/22 1830   Narrative:     EXAMINATION:   US ABDOMEN LIMITED     CLINICAL HISTORY:   RUQ, s/p leslie, gets bile duct stones;.     TECHNIQUE:   Limited ultrasound of the right upper quadrant of the abdomen including pancreas, liver, gallbladder, common bile duct was performed.     COMPARISON:   CT abdomen pelvis 03/27/2022, MRI abdomen 03/02/2022     FINDINGS:   Liver: Normal in size, measuring 14.3 cm with homogeneous echotexture.  1.9 x 2.0 x 1.5 heterogeneous echogenic lesion in the right hepatic lobe in keeping with known hepatic metastatic disease and better characterized on above comparison MRI abdomen.     Gallbladder: The gallbladder is surgically absent.     Biliary system: The common duct is not dilated, measuring 6 mm.  Mild central intrahepatic biliary dilatation.     IVC: Visualized portions appear within normal limits.     Spleen: Normal in size with a homogeneous echotexture, measuring 7.5 x 3.1 cm.     Pancreas: The visualized portions of pancreas appear normal.     Miscellaneous: No ascites.    Impression:       Mildly dilated central intrahepatic bile ducts.  Common bile duct is normal caliber status post cholecystectomy.  No  choledocholithiasis.     Right hepatic lobe lesion in keeping with known hepatic metastatic disease, better characterized on above comparison MRI.     Electronically signed by resident: Lon Hanson MD   Date: 03/27/2022   Time: 18:12     Electronically signed by: Gen Ray MD   Date: 03/27/2022   Time: 18:27   CT Abdomen Pelvis Without Contrast [[id number]] Resulted: 03/27/22 1556   Order Status: Completed Updated: 03/27/22 1559   Narrative:     EXAMINATION:   CT ABDOMEN PELVIS WITHOUT CONTRAST     CLINICAL HISTORY:   Abdominal pain, post-op;     TECHNIQUE:   Low dose axial images, sagittal and coronal reformations were obtained from the lung bases to the pubic symphysis.  Oral contrast was not administered.     COMPARISON:   03/11/2022     FINDINGS:   The lung bases are clear.     No pericardial effusion.     The noncontrast appearance of the liver demonstrates areas of parenchymal calcifications in the left hepatic lobe, unchanged.     The biliary ducts do not appear dilated.  The common bile duct measures about 6 mm.     No radiopaque stones seen within the common bile duct.     Cholecystectomy clips seen.     The stomach appears normal.  The pancreas appears normal.     The spleen, bilateral adrenal glands appear normal.     There are bilateral sizable forming kidney calculi.  There is no hydronephrosis or hydroureter, no stones seen within the bilateral ureters or within the bladder.     The abdominal aorta tapers normally, no para-aortic lymphadenopathy.     Mild stool retention.  Prior partial colectomy.  No bowel dilation.  No inflammatory changes of the bowel seen.     5.0 x 2.5 cm partially calcified soft tissue mass in the right mid anterior abdomen region, not significantly changed.     Note that the patency of the mesenteric vasculature cannot be assessed without the use of IV contrast.     The bladder is nondistended.  The uterus is removed.  The left ovary is not seen, the right ovary appears  normal.     No free fluid, no free air.     The inguinal regions appear normal.     The osseous structures demonstrate degenerative changes at the bilateral sacroiliac joints.  No osseous lesions.    Impression:       No definite abnormality seen to explain patient's severe pain after ERCP.     Cholecystectomy.     Bilateral numerous nonobstructive kidney calculi     Prior partial colectomy.     Unchanged soft tissue mass within the right lower abdominal quadrant.     Note that the patency of the mesenteric vasculature cannot be assessed on noncontrast images.       Electronically signed by: Renetta Snow MD   Date: 03/27/2022   Time: 15:56

## 2022-03-30 NOTE — PLAN OF CARE
Referral received in anticipation of patient needing home IV antibiotics at discharge. Patient on ertapenem 1 GM IV once a day. Pending final ID recommendations, orders, line, and home health. Once above completed will begin education with  patient. Will cont to follow. Thank you for referring.     [facility]  Christopher Gomez Rn, Clinical Educator  Cell [phone]  Office [phone]  Fax [phone]

## 2022-03-30 NOTE — PLAN OF CARE
Problem: Adult Inpatient Plan of Care  Goal: Plan of Care Review  Outcome: Ongoing, Progressing  Goal: Patient-Specific Goal (Individualized)  Outcome: Ongoing, Progressing  Goal: Absence of Hospital-Acquired Illness or Injury  Outcome: Ongoing, Progressing  Goal: Optimal Comfort and Wellbeing  Outcome: Ongoing, Progressing  Goal: Readiness for Transition of Care  Outcome: Ongoing, Progressing     Problem: Infection  Goal: Absence of Infection Signs and Symptoms  Outcome: Ongoing, Progressing     Problem: Fluid and Electrolyte Imbalance (Acute Kidney Injury/Impairment)  Goal: Fluid and Electrolyte Balance  Outcome: Ongoing, Progressing     Problem: Oral Intake Inadequate (Acute Kidney Injury/Impairment)  Goal: Optimal Nutrition Intake  Outcome: Ongoing, Progressing     Problem: Renal Function Impairment (Acute Kidney Injury/Impairment)  Goal: Effective Renal Function  Outcome: Ongoing, Progressing

## 2022-03-31 PROCEDURE — 25000003 PHARM REV CODE 250: Performed by: PHYSICIAN ASSISTANT

## 2022-03-31 PROCEDURE — 97162 PT EVAL MOD COMPLEX 30 MIN: CPT

## 2022-03-31 PROCEDURE — 99233 PR SUBSEQUENT HOSPITAL CARE,LEVL III: ICD-10-PCS | Mod: ,,,

## 2022-03-31 PROCEDURE — 20600001 HC STEP DOWN PRIVATE ROOM

## 2022-03-31 PROCEDURE — 94761 N-INVAS EAR/PLS OXIMETRY MLT: CPT

## 2022-03-31 PROCEDURE — 97165 OT EVAL LOW COMPLEX 30 MIN: CPT

## 2022-03-31 PROCEDURE — 97530 THERAPEUTIC ACTIVITIES: CPT

## 2022-03-31 PROCEDURE — 99900035 HC TECH TIME PER 15 MIN (STAT)

## 2022-03-31 PROCEDURE — 63600175 PHARM REV CODE 636 W HCPCS: Performed by: PHYSICIAN ASSISTANT

## 2022-03-31 PROCEDURE — 99233 SBSQ HOSP IP/OBS HIGH 50: CPT | Mod: ,,,

## 2022-03-31 PROCEDURE — 97535 SELF CARE MNGMENT TRAINING: CPT

## 2022-03-31 PROCEDURE — 25000003 PHARM REV CODE 250: Performed by: EMERGENCY MEDICINE

## 2022-03-31 PROCEDURE — 27000207 HC ISOLATION

## 2022-03-31 PROCEDURE — 97116 GAIT TRAINING THERAPY: CPT

## 2022-03-31 PROCEDURE — A4216 STERILE WATER/SALINE, 10 ML: HCPCS | Performed by: EMERGENCY MEDICINE

## 2022-03-31 RX ADMIN — METOPROLOL TARTRATE 25 MG: 25 TABLET, FILM COATED ORAL at 09:03

## 2022-03-31 RX ADMIN — LIDOCAINE 1 PATCH: 50 PATCH CUTANEOUS at 09:03

## 2022-03-31 RX ADMIN — ATORVASTATIN CALCIUM 40 MG: 20 TABLET, FILM COATED ORAL at 08:03

## 2022-03-31 RX ADMIN — DICLOFENAC SODIUM 2 G: 10 GEL TOPICAL at 09:03

## 2022-03-31 RX ADMIN — FAMOTIDINE 20 MG: 20 TABLET ORAL at 09:03

## 2022-03-31 RX ADMIN — LOPERAMIDE HYDROCHLORIDE 4 MG: 2 CAPSULE ORAL at 02:03

## 2022-03-31 RX ADMIN — FAMOTIDINE 20 MG: 20 TABLET ORAL at 08:03

## 2022-03-31 RX ADMIN — METOCLOPRAMIDE 5 MG: 5 TABLET ORAL at 04:03

## 2022-03-31 RX ADMIN — MEROPENEM 2 G: 1 INJECTION INTRAVENOUS at 09:03

## 2022-03-31 RX ADMIN — METHOCARBAMOL 500 MG: 500 TABLET ORAL at 08:03

## 2022-03-31 RX ADMIN — ENOXAPARIN SODIUM 40 MG: 100 INJECTION SUBCUTANEOUS at 04:03

## 2022-03-31 RX ADMIN — METOCLOPRAMIDE 5 MG: 5 TABLET ORAL at 10:03

## 2022-03-31 RX ADMIN — OXYCODONE HYDROCHLORIDE 15 MG: 10 TABLET ORAL at 05:03

## 2022-03-31 RX ADMIN — LOSARTAN POTASSIUM 100 MG: 50 TABLET, FILM COATED ORAL at 09:03

## 2022-03-31 RX ADMIN — CYANOCOBALAMIN TAB 1000 MCG 1000 MCG: 1000 TAB at 09:03

## 2022-03-31 RX ADMIN — OXYCODONE HYDROCHLORIDE 15 MG: 10 TABLET ORAL at 10:03

## 2022-03-31 RX ADMIN — OXYCODONE HYDROCHLORIDE 15 MG: 10 TABLET ORAL at 08:03

## 2022-03-31 RX ADMIN — Medication 1 CAPSULE: at 09:03

## 2022-03-31 RX ADMIN — AMLODIPINE BESYLATE 5 MG: 5 TABLET ORAL at 09:03

## 2022-03-31 RX ADMIN — METOPROLOL TARTRATE 25 MG: 25 TABLET, FILM COATED ORAL at 08:03

## 2022-03-31 RX ADMIN — SODIUM CHLORIDE 10 ML: 9 INJECTION, SOLUTION INTRAMUSCULAR; INTRAVENOUS; SUBCUTANEOUS at 04:03

## 2022-03-31 RX ADMIN — OXYCODONE HYDROCHLORIDE 15 MG: 10 TABLET ORAL at 02:03

## 2022-03-31 RX ADMIN — LOPERAMIDE HYDROCHLORIDE 4 MG: 2 CAPSULE ORAL at 08:03

## 2022-03-31 RX ADMIN — LOPERAMIDE HYDROCHLORIDE 4 MG: 2 CAPSULE ORAL at 09:03

## 2022-03-31 RX ADMIN — METOCLOPRAMIDE 5 MG: 5 TABLET ORAL at 05:03

## 2022-03-31 NOTE — PLAN OF CARE
Problem: Occupational Therapy  Goal: Occupational Therapy Goal  Description: Goals to be met by: 4/7/22     Patient will increase functional independence with ADLs by performing:    LE Dressing with Stand-by Assistance.  Grooming while standing at sink with Stand-by Assistance.  Toileting from toilet with Supervision for hygiene and clothing management.   Toilet transfer to toilet with Supervision.  Increased functional strength to WFL for increased participation in ADLs.  Functional mobility of household and community distance with  supervision and AD as needed      Outcome: Ongoing, Progressing

## 2022-03-31 NOTE — PROGRESS NOTES
[facility] - Telemetry [facility]  Progress Note    Patient Name: Patito Domnigo  MRN: [id number]  Patient Class: IP- Inpatient   Admission Date: 3/27/2022  Length of Stay: 2 days  Attending Physician: Dilan Alba MD  Primary Care Provider: Yasir Myers Jr, MD        Subjective:     Principal Problem:Urinary tract infection without hematuria        HPI:  Patito Domingo is a 71 y.o. female with a PMHx of HTN, HLD, HFpEF, carcinoid tumor of midgut, s/p CCY with recent ERCP 2/2 retained stones who presents to [facility] with complaints of abdominal pain. Patient reports intermittent RLQ abdominal pain with associated nausea, dry heaving, and decreased PO intake since her recent ERCP 3 weeks ago. She has difficulty describing characteristics of abdominal pain but says she thinks pain worsens after eating. She also reports several episodes of diffuse watery diarrhea for the last few days, says she had 6 episodes prior to arrival to the ED today. She took imodium 2 days ago without improvement. She denies dark/bloody stools but says her stool appears grey colored. Patient also complaints of urinary frequency, dysuria, and feeling hot/cold recently. Denies fever, chills, diaphoresis, vomiting, fever, cough, SOB, chest pain, dizziness, HA, or syncope.     ED: AFVSS. No leukocytosis. RUQ US and CT abd/pelvis without acute abnormalities. UA consistent with infection. Most recent Ucx grew Klebsiella and Hafnia resistant to multiple organisms. Given IV meropenem based on recent Ucx sensitivities.       Overview/Hospital Course:  Patient admitted for UTI. Afebrile without leukocytosis. CT AP unremarkable. UA +. Started on IV meropenem based off of prior Ucx. Ucx here showing Klebsiella ESBL. ID consulted and switched to ertapenem as her organism is sensitive to this and it is a 1x day dose. However pt says it burned her arm, will see if patient can tolerate today. Ultimately switched to meropenem due to  adverse effects. Also reported some diarrhea, c diff negative. Will start probiotic. PT/OT consulted, recommend SNF. Patient states she will not go to a SNF unless it is with [facility]. Plan for discharge home with IV abx vs. SNF after midline is placed.      Interval History: Patient seen and examined. AL VILLASEÑOR. Patient refused midline placement x2 yesterday afternoon. PT/OT recommend SNF. Patient states she will not go to a SNF unless it is at [facility]. Spoke with patient's son, Matias, on the phone who agreed to assist in IV abx administration at home. Patient seems to be agreeable to midline, consult placed again. Plan for discharge to OSNF vs. home with HH tomorrow. Patient still reports mild abdominal discomfort.    Review of Systems   Constitutional:  Positive for appetite change. Negative for fever.   Respiratory:  Negative for shortness of breath.    Cardiovascular:  Negative for chest pain.   Gastrointestinal:  Positive for abdominal pain, diarrhea and nausea. Negative for abdominal distention and vomiting.   Genitourinary:  Positive for dysuria and frequency. Negative for difficulty urinating.   Musculoskeletal:  Negative for arthralgias.   Skin:  Negative for color change.   Neurological:  Negative for speech difficulty and light-headedness.   Psychiatric/Behavioral:  Negative for confusion.    Objective:     Vital Signs (Most Recent):  Temp: 98.3 °F (36.8 °C) (03/31/22 1542)  Pulse: 80 (03/31/22 1542)  Resp: 18 (03/31/22 1542)  BP: 135/73 (03/31/22 1542)  SpO2: 98 % (03/31/22 1542) Vital Signs (24h Range):  Temp:  [97.5 °F (36.4 °C)-99.3 °F (37.4 °C)] 98.3 °F (36.8 °C)  Pulse:  [56-80] 80  Resp:  [18] 18  SpO2:  [93 %-98 %] 98 %  BP: (135-180)/(65-74) 135/73     Weight: 78.4 kg (172 lb 13.5 oz)  Body mass index is 27.9 kg/m².  No intake or output data in the 24 hours ending 03/31/22 1610   Physical Exam  Vitals and nursing note reviewed.   Constitutional:       General: She is not in acute distress.      Appearance: She is well-developed. She is obese.   HENT:      Head: Normocephalic and atraumatic.   Eyes:      Extraocular Movements: Extraocular movements intact.   Cardiovascular:      Rate and Rhythm: Normal rate and regular rhythm.   Pulmonary:      Effort: Pulmonary effort is normal. No respiratory distress.   Abdominal:      General: Abdomen is flat. Bowel sounds are normal. There is no distension.      Palpations: Abdomen is soft.      Tenderness: There is abdominal tenderness (RLQ).   Musculoskeletal:         General: Normal range of motion.      Right lower leg: No edema.      Left lower leg: No edema.   Skin:     General: Skin is warm and dry.   Neurological:      General: No focal deficit present.      Mental Status: She is alert.   Psychiatric:         Mood and Affect: Mood normal.         Behavior: Behavior normal.       Significant Labs: All pertinent labs within the past 24 hours have been reviewed.  BMP:   Recent Labs   Lab 03/30/22  0307   GLU 91      K 3.8      CO2 24   BUN 14   CREATININE 0.8   CALCIUM 9.4     CBC:   Recent Labs   Lab 03/30/22  0538   WBC 4.04   HGB 9.0*   HCT 29.4*          Significant Imaging: I have reviewed all pertinent imaging results/findings within the past 24 hours.      Assessment/Plan:      * Urinary tract infection without hematuria  History of ESBL E. coli infection  Multiple drug resistant organism (MDRO) culture positive  - hx recurrent MDRO UTIs including ESBL   - afebrile without leukocytosis  - UA infectious, + dysuria  - given IV meropenem in the ED based on prior UCx sensitivities, will continue  - ID consulted for further antibiotic clearance- switched to ertapenem however patient had pain with administration so switched back to merropenem  - follow UCx- klebs ESBL, will need 14-day total course of meropenem  - consult to midline placed  - start probiotic    Generalized abdominal pain  - suspect largely due to abdominal carcinoid tumor and  UTI contributing, though some c/f c.diff given multiple episodes of diffuse diarrhea   - LFTs and lipase WNL  - RUQ US and CT abd/pelvis without etiology of abd pain  - c.diff negative  - continue home pain meds  - PRN anti-emetics   - add famotidine and reglan     Chronic diastolic heart failure with preserved ejection fraction  - appears dry vs euvolemic  - monitor volume status s/p IVFs  - continue ARB, BB  - strict I/Os    Malignant carcinoid tumor of midgut  - likely cause/ contributing to abdominal pain  - follows with heme/onc as outpatient  - continue home PRN oxycodone    Vertigo  - continue PRN meclizine    HLD (hyperlipidemia)  - continue statin     Essential hypertension  - continue amlodipine 2.5mg daily, losartan 100mg daily and MTP 25mg BID      VTE Risk Mitigation (From admission, onward)         Ordered     enoxaparin injection 40 mg  Daily         03/27/22 2250     IP VTE HIGH RISK PATIENT  Once         03/27/22 2250     Place sequential compression device  Until discontinued         03/27/22 2240                Discharge Planning   JULIO C: 3/31/2022     Code Status: Full Code   Is the patient medically ready for discharge?: No    Reason for patient still in hospital (select all that apply): Treatment and Pending disposition  Discharge Plan A: Skilled Nursing Facility   Discharge Delays: None known at this time      Discussed patient's plan of care with MARÍA ELENA SantamariaC  Department of Hospital Medicine   [facility] - Telemetry Stepdown

## 2022-03-31 NOTE — PLAN OF CARE
03/31/22 1650   Post-Acute Status   Post-Acute Authorization Home Health;IV Infusion   Post-Acute Placement Status Pending medical clearance/testing  (Needs midline and family education 4/1)   Home Health Status Referrals Sent   Provided patient with HH list as requested. Patient prefers [facility] as first preference. Second choice is [facility]. Patient DOES NOT want [facility].    Mer Coleman LMSW  [facility]  Ext. [phone]

## 2022-03-31 NOTE — PT/OT/SLP EVAL
"Occupational Therapy   Co-Evaluation/Treatment    Name: Patito Domingo  MRN: [id number]  Admitting Diagnosis:  Urinary tract infection without hematuria  Recent Surgery: * No surgery found *      Recommendations:     Discharge Recommendations: nursing facility, skilled  Discharge Equipment Recommendations:  none  Barriers to discharge:  Inaccessible home environment, Decreased caregiver support    Assessment:     Patito Domingo is a 71 y.o. female with a medical diagnosis of Urinary tract infection without hematuria.  She presents with the following performance deficits affecting function: weakness, impaired endurance, impaired self care skills, impaired functional mobilty, gait instability, impaired balance, decreased upper extremity function, decreased lower extremity function, decreased safety awareness, pain, decreased ROM.      Pt agreeable to therapy. Ambulated ~80 ft this session with CGA and rollator. Practiced 4 steps with Min A required and increased cues for safety. Pt then with a BM on the toilet, requiring SBA for toileting and Min A for transfers. Transfers throughout the session ranged from Mod A to Min A. Pt demonstrating self limiting behavior this date. At this time, she is not safe to d/c home alone. Pt would benefit from continued skilled acute OT services in order to maximize independence and safety with ADLs and functional mobility to ensure safe return to PLOF in the least restrictive environment. OT recommending SNF once pt is medically appropriate for d/c.     Rehab Prognosis: Good; patient would benefit from acute skilled OT services to address these deficits and reach maximum level of function.       Plan:     Patient to be seen 4 x/week to address the above listed problems via self-care/home management, therapeutic activities, therapeutic exercises  · Plan of Care Expires: 04/30/22  · Plan of Care Reviewed with: patient    Subjective     "I can't do this"     Chief Complaint: Pain "   Patient/Family Comments/goals: To return to PLOF    Occupational Profile:  Living Environment: Pt lives alone in a town house with 17 steps to enter. She has a tub/shower combo with a bath bench.   Previous level of function: Uses a rollator for ambulation and reports that she can perform bathing, dressing, and toileting on her own. She has someone come to her house once a week to wash her hair.   Roles and Routines: Pt does not drive, she has a transportation service bring her to appointments   Equipment Used at Home:  walker, rolling, rollator, bath bench, bedside commode, wheelchair  Assistance upon Discharge: Pt reports that her son is able to come by if needed     Pain/Comfort:  · Pain Rating 1: other (see comments) (not rated)  · Location - Orientation 1: generalized  · Pain Addressed 1: Reposition, Distraction  · Pain Rating Post-Intervention 1: other (see comments) (not rated)    Patients cultural, spiritual, Mu-ism conflicts given the current situation: no    Objective:     Co-evaluation/treatment performed due to patient's multiple deficits requiring two skilled therapists to appropriately and safely assess patient's strength and endurance while facilitating functional tasks in addition to accommodating for patient's activity tolerance.     Communicated with: RN prior to session.  Patient found HOB elevated with telemetry upon OT entry to room.    General Precautions: Standard, fall   Orthopedic Precautions:N/A   Braces: N/A  Respiratory Status: Room air    Occupational Performance:    Bed Mobility:    · Patient completed Rolling/Turning to Left with  stand by assistance  · Patient completed Scooting/Bridging with stand by assistance  · Patient completed Supine to Sit with minimum assistance  · Patient completed Sit to Supine with minimum assistance    Functional Mobility/Transfers:  · Patient completed Sit <> Stand Transfer with moderate assistance  with  rollator x 3 trials   · Patient completed  Toilet Transfer Step Transfer technique with minimum assistance with  rollator  · Functional Mobility: Pt engaging in functional mobility to simulate household/community distances approx 80 ft with CGA and utilizing rollator in order to maximize functional activity tolerance and standing balance required for engagement in occupations of choice.    Activities of Daily Living:  · Lower Body Dressing: maximal assistance : To adjust socks sitting EOB   · Toileting: stand by assistance : For a BM on the toilet. Performed perineal hygiene by hovering over the toilet with use of grab bars.     Cognitive/Visual Perceptual:  Cognitive/Psychosocial Skills:     -       Oriented to: Person, Place, Time and Situation   -       Follows Commands/attention:Follows multistep  commands  -       Safety awareness/insight to disability: impaired   -       Mood/Affect/Coping skills/emotional control: Appropriate to situation    Physical Exam:    Balance:  Static Sitting   supervision   Dynamic Sitting   stand by assistance   Static Standing   contact guard assistance   Dynamic Standing   contact guard assistance     Upper Extremity Function:   Edema None noted   Sensation    -       Impaired  light/touch In L hand per Pt report    Hand Dominance Left   LUE ROM  ~90* flexion, all other planes WFL   RUE ROM ~90* flexion, all other planes WFL   LUE Strength  Flexion 3-/5, biceps/triceps 4-/5   RUE Strength  Flexion 3-/5, biceps/triceps 4-/5   LUE  Strength fair   RUE  Strength  fair   Fine Motor Skills     -       Intact   Gross Motor skills    WFL         AMPAC 6 Click ADL:  AMPAC Total Score: 20    Treatment & Education:   Therapist provided facilitation and instruction of proper body mechanics and fall prevention strategies during tasks listed above.   Instructed patient to sit in bedside chair daily to increase OOB/activity tolerance.   Instructed patient to use call light to have nursing staff assist with  needs/transfers.   Discussed OT POC and answered all questions within OT scope of practice.  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)        Past Surgical History:   Procedure Laterality Date    ABDOMINAL SURGERY      CATARACT EXTRACTION Left 10/2017     SECTION      CHOLECYSTECTOMY      COLON SURGERY      cystoscope      CYSTOSCOPY W/ RETROGRADES Right 10/10/2019    Procedure: CYSTOSCOPY, WITH RETROGRADE PYELOGRAM;  Surgeon: Gen Isbell MD;  Location: [facility] OR;  Service: Urology;  Laterality: Right;    ERCP N/A 2021    Procedure: ERCP (ENDOSCOPIC RETROGRADE CHOLANGIOPANCREATOGRAPHY);  Surgeon: Jayjay Rivera MD;  Location: [facility] ([address] [place] [address]);  Service: Endoscopy;  Laterality: N/A;    ERCP N/A 3/4/2022    Procedure: ERCP (ENDOSCOPIC RETROGRADE CHOLANGIOPANCREATOGRAPHY);  Surgeon: Roby Virgen MD;  Location: [facility] ENDO ([address] [place] [address]);  Service: Endoscopy;  Laterality: N/A;    EYE SURGERY      HYSTERECTOMY  1996    LITHOTRIPSY      LIVER BIOPSY      carcinoid    URETEROSCOPY Right 10/10/2019    Procedure: URETEROSCOPY;  Surgeon: Gen Isbell MD;  Location: [facility] OR;  Service: Urology;  Laterality: Right;    UTERINE FIBROID SURGERY         Time Tracking:     OT Date of Treatment: 22  OT Start Time: 1044  OT Stop Time: 1125  OT Total Time (min): 41 min    Billable Minutes:Evaluation 11  Self Care/Home Management 15  Therapeutic Activity  15    3/31/2022

## 2022-03-31 NOTE — ASSESSMENT & PLAN NOTE
- suspect largely due to abdominal carcinoid tumor and UTI contributing, though some c/f c.diff given multiple episodes of diffuse diarrhea   - LFTs and lipase WNL  - RUQ US and CT abd/pelvis without etiology of abd pain  - c.diff negative  - continue home pain meds  - PRN anti-emetics   - add famotidine and reglan

## 2022-03-31 NOTE — ASSESSMENT & PLAN NOTE
History of ESBL E. coli infection  Multiple drug resistant organism (MDRO) culture positive  - hx recurrent MDRO UTIs including ESBL   - afebrile without leukocytosis  - UA infectious, + dysuria  - given IV meropenem in the ED based on prior UCx sensitivities, will continue  - ID consulted for further antibiotic clearance- switched to ertapenem however patient had pain with administration so switched back to merropenem  - follow UCx- klebs ESBL, will need 14-day total course of meropenem  - consult to midline placed  - start probiotic

## 2022-03-31 NOTE — SUBJECTIVE & OBJECTIVE
Interval History: Patient seen and examined. AL VILLASEÑOR. Patient refused midline placement x2 yesterday afternoon. PT/OT recommend SNF. Patient states she will not go to a SNF unless it is at [facility]. Spoke with patient's son, Matias, on the phone who agreed to assist in IV abx administration at home. Patient seems to be agreeable to midline, consult placed again. Plan for discharge to OSNF vs. home with HH tomorrow. Patient still reports mild abdominal discomfort.    Review of Systems   Constitutional:  Positive for appetite change. Negative for fever.   Respiratory:  Negative for shortness of breath.    Cardiovascular:  Negative for chest pain.   Gastrointestinal:  Positive for abdominal pain, diarrhea and nausea. Negative for abdominal distention and vomiting.   Genitourinary:  Positive for dysuria and frequency. Negative for difficulty urinating.   Musculoskeletal:  Negative for arthralgias.   Skin:  Negative for color change.   Neurological:  Negative for speech difficulty and light-headedness.   Psychiatric/Behavioral:  Negative for confusion.    Objective:     Vital Signs (Most Recent):  Temp: 98.3 °F (36.8 °C) (03/31/22 1542)  Pulse: 80 (03/31/22 1542)  Resp: 18 (03/31/22 1542)  BP: 135/73 (03/31/22 1542)  SpO2: 98 % (03/31/22 1542) Vital Signs (24h Range):  Temp:  [97.5 °F (36.4 °C)-99.3 °F (37.4 °C)] 98.3 °F (36.8 °C)  Pulse:  [56-80] 80  Resp:  [18] 18  SpO2:  [93 %-98 %] 98 %  BP: (135-180)/(65-74) 135/73     Weight: 78.4 kg (172 lb 13.5 oz)  Body mass index is 27.9 kg/m².  No intake or output data in the 24 hours ending 03/31/22 1610   Physical Exam  Vitals and nursing note reviewed.   Constitutional:       General: She is not in acute distress.     Appearance: She is well-developed. She is obese.   HENT:      Head: Normocephalic and atraumatic.   Eyes:      Extraocular Movements: Extraocular movements intact.   Cardiovascular:      Rate and Rhythm: Normal rate and regular rhythm.   Pulmonary:       Effort: Pulmonary effort is normal. No respiratory distress.   Abdominal:      General: Abdomen is flat. Bowel sounds are normal. There is no distension.      Palpations: Abdomen is soft.      Tenderness: There is abdominal tenderness (RLQ).   Musculoskeletal:         General: Normal range of motion.      Right lower leg: No edema.      Left lower leg: No edema.   Skin:     General: Skin is warm and dry.   Neurological:      General: No focal deficit present.      Mental Status: She is alert.   Psychiatric:         Mood and Affect: Mood normal.         Behavior: Behavior normal.       Significant Labs: All pertinent labs within the past 24 hours have been reviewed.  BMP:   Recent Labs   Lab 03/30/22  0307   GLU 91      K 3.8      CO2 24   BUN 14   CREATININE 0.8   CALCIUM 9.4     CBC:   Recent Labs   Lab 03/30/22  0538   WBC 4.04   HGB 9.0*   HCT 29.4*          Significant Imaging: I have reviewed all pertinent imaging results/findings within the past 24 hours.

## 2022-03-31 NOTE — PLAN OF CARE
Problem: Adult Inpatient Plan of Care  Goal: Plan of Care Review  Outcome: Ongoing, Progressing  Goal: Patient-Specific Goal (Individualized)  Outcome: Ongoing, Progressing  Goal: Absence of Hospital-Acquired Illness or Injury  Outcome: Ongoing, Progressing  Goal: Optimal Comfort and Wellbeing  Outcome: Ongoing, Progressing  Goal: Readiness for Transition of Care  Outcome: Ongoing, Progressing     Problem: Infection  Goal: Absence of Infection Signs and Symptoms  Outcome: Ongoing, Progressing     Problem: Fluid and Electrolyte Imbalance (Acute Kidney Injury/Impairment)  Goal: Fluid and Electrolyte Balance  Outcome: Ongoing, Progressing     Problem: Oral Intake Inadequate (Acute Kidney Injury/Impairment)  Goal: Optimal Nutrition Intake  Outcome: Ongoing, Progressing     Problem: Renal Function Impairment (Acute Kidney Injury/Impairment)  Goal: Effective Renal Function  Outcome: Ongoing, Progressing   Patient is alert, oriented and conscious. Vital signs taken and recorded. SPO2 maintain in RA. Intake output recorded. Medication given as per order. Pain management done. Mobilization done in room. Skin care done. Will continue to monitor.

## 2022-03-31 NOTE — PT/OT/SLP EVAL
Physical Therapy Evaluation    Patient Name:  Patito Domingo   MRN:  [id number]    Recommendations:     Discharge Recommendations:  nursing facility, skilled   Discharge Equipment Recommendations: none   Barriers to discharge: Decreased caregiver support and Increased mob and transfer needs    Assessment:     Patito Domingo is a 71 y.o. female admitted with a medical diagnosis of Urinary tract infection without hematuria.  She presents with the following impairments/functional limitations:  weakness, impaired endurance, impaired self care skills, impaired functional mobilty, gait instability, impaired balance, decreased safety awareness . Upon arrival, pt was elevated HOB on room air. Pt was Maia sup<sit, and was modA- Maia w/ sit<>stand w/ Rollator. Pt amb 80ft w/ Rollator Maia increased flexed lumbar and space between rollator. Pt needed seated rest breaks during gait. Pt ascended 4 steps Maia and min-mod vcs. Descending 4 steps laterally during to increase fear and reporting inability. Increased fatigue during session. Pt was RTB sup>sit Maia w/ LLE assist.    Rehab Prognosis: Good; patient would benefit from acute skilled PT services to address these deficits and reach maximum level of function.    Recent Surgery: * No surgery found *      Plan:     During this hospitalization, patient to be seen 4 x/week to address the identified rehab impairments via gait training, therapeutic activities, therapeutic exercises, neuromuscular re-education and progress toward the following goals:    · Plan of Care Expires:  04/30/22    Subjective     Chief Complaint: I can't do the stairs, I don't want to fall.  Patient/Family Comments/goals: None stated  Pain/Comfort:  · Pain Rating 1: 0/10    Patients cultural, spiritual, Christianity conflicts given the current situation: no    Living Environment:  Pt lives alone in a townhouse. Has 17steps upon entry. All living quarters on 2nd floor. Pt has son who lives in area but d/t  bridge repair unable to get to her in a timely fashion.   Prior to admission, patients level of function was Mod I.  Equipment used at home: rollator, bedside commode, bath bench, wheelchair, walker, rolling.  DME owned (not currently used): none.  Upon discharge, patient will have assistance from Son but mostly no one.    Objective:     Communicated with Cece FLANAGAN prior to session.  Patient found HOB elevated with peripheral IV  upon PT entry to room.    General Precautions: Standard, fall   Orthopedic Precautions:N/A   Braces: N/A  Respiratory Status: Room air    Exams:  · Cognitive Exam:  Patient is oriented to Person, Place, Time and Situation  · Gross Motor Coordination:  WFL  · Postural Exam:  Patient presented with the following abnormalities:    · -       Rounded shoulders  · -       Forward head  · RLE ROM: WFL  · RLE Strength: Deficits: 3+/5  · LLE ROM: WFL  · LLE Strength: Deficits: 3+/5    Functional Mobility:  · Bed Mobility:     · Supine to Sit: minimum assistance  · Sit to Supine: minimum assistance and LE assist  · Transfers:     · Sit to Stand:  minimum assistance and moderate assistance with rollator  · Gait: 80ft w/ rollator,  increased flexed lumbar and space between rollator. Needed seated rest breaks during gait.   · Stairs: Ascended 4 steps Maia and min-mod vcs. Descending 4 steps laterally during to increase fear and reporting inability    Therapeutic Activities and Exercises:   Importance of mob, safety awareness, DC planning, gait and stair training for increased functional mob and endurance,  Transfer for increased OOB mob, postural awareness    AM-PAC 6 CLICK MOBILITY  Total Score:15     Patient left HOB elevated with all lines intact, call button in reach and bed alarm on.    GOALS:   Multidisciplinary Problems     Physical Therapy Goals        Problem: Physical Therapy    Goal Priority Disciplines Outcome Goal Variances Interventions   Physical Therapy Goal     PT, PT/OT       Description: Goals to be met by: Discharge     Patient will increase functional independence with mobility by performin. Supine to sit with Stand-by Assistance  2. Sit to supine with Stand-by Assistance  3. Sit to stand transfer with Supervision  4. Gait  x 150 feet with Supervision using Rollator.   5. Ascend/descend 20 stair with bilateral Handrails Stand-by Assistance using Rollator.                     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)        Past Surgical History:   Procedure Laterality Date    ABDOMINAL SURGERY      CATARACT EXTRACTION Left 10/2017     SECTION      CHOLECYSTECTOMY      COLON SURGERY      cystoscope      CYSTOSCOPY W/ RETROGRADES Right 10/10/2019    Procedure: CYSTOSCOPY, WITH RETROGRADE PYELOGRAM;  Surgeon: Gen Isbell MD;  Location: [facility];  Service: Urology;  Laterality: Right;    ERCP N/A 2021    Procedure: ERCP (ENDOSCOPIC RETROGRADE CHOLANGIOPANCREATOGRAPHY);  Surgeon: Jayjay Rivera MD;  Location: [address]);  Service: Endoscopy;  Laterality: N/A;    ERCP N/A 3/4/2022    Procedure: ERCP (ENDOSCOPIC RETROGRADE CHOLANGIOPANCREATOGRAPHY);  Surgeon: Roby Virgen MD;  Location: [facility] ([address] [place] [address]);  Service: Endoscopy;  Laterality: N/A;    EYE SURGERY      HYSTERECTOMY  1996    LITHOTRIPSY      LIVER BIOPSY      carcinoid    URETEROSCOPY Right 10/10/2019    Procedure: URETEROSCOPY;  Surgeon: Gen Isbell MD;  Location: [facility];  Service: Urology;  Laterality: Right;    UTERINE FIBROID SURGERY         Time Tracking:     PT Received On: 22  PT Start Time: 1044     PT Stop Time: 1128  PT  Total Time (min): 44 min     Billable Minutes: Evaluation 10, Gait Training 20 and Therapeutic Activity 14      03/31/2022

## 2022-04-01 VITALS
RESPIRATION RATE: 18 BRPM | DIASTOLIC BLOOD PRESSURE: 72 MMHG | WEIGHT: 172.81 LBS | SYSTOLIC BLOOD PRESSURE: 159 MMHG | TEMPERATURE: 98 F | OXYGEN SATURATION: 93 % | HEIGHT: 66 IN | HEART RATE: 72 BPM | BODY MASS INDEX: 27.77 KG/M2

## 2022-04-01 PROCEDURE — 25000003 PHARM REV CODE 250: Performed by: PHYSICIAN ASSISTANT

## 2022-04-01 PROCEDURE — C1751 CATH, INF, PER/CENT/MIDLINE: HCPCS

## 2022-04-01 PROCEDURE — 63600175 PHARM REV CODE 636 W HCPCS: Performed by: PHYSICIAN ASSISTANT

## 2022-04-01 PROCEDURE — 99239 HOSP IP/OBS DSCHRG MGMT >30: CPT | Mod: ,,,

## 2022-04-01 PROCEDURE — 36410 VNPNXR 3YR/> PHY/QHP DX/THER: CPT

## 2022-04-01 PROCEDURE — 1111F DSCHRG MED/CURRENT MED MERGE: CPT | Mod: CPTII,,,

## 2022-04-01 PROCEDURE — 99239 PR HOSPITAL DISCHARGE DAY,>30 MIN: ICD-10-PCS | Mod: ,,,

## 2022-04-01 PROCEDURE — 1111F PR DISCHARGE MEDS RECONCILED W/ CURRENT OUTPATIENT MED LIST: ICD-10-PCS | Mod: CPTII,,,

## 2022-04-01 PROCEDURE — 76937 US GUIDE VASCULAR ACCESS: CPT

## 2022-04-01 RX ORDER — AMLODIPINE BESYLATE 5 MG/1
5 TABLET ORAL DAILY
Qty: 30 TABLET | Refills: 11 | Status: SHIPPED | OUTPATIENT
Start: 2022-04-02 | End: 2022-06-21

## 2022-04-01 RX ADMIN — LIDOCAINE 1 PATCH: 50 PATCH CUTANEOUS at 09:04

## 2022-04-01 RX ADMIN — OXYCODONE HYDROCHLORIDE 15 MG: 10 TABLET ORAL at 06:04

## 2022-04-01 RX ADMIN — METOCLOPRAMIDE 5 MG: 5 TABLET ORAL at 06:04

## 2022-04-01 RX ADMIN — LOPERAMIDE HYDROCHLORIDE 4 MG: 2 CAPSULE ORAL at 08:04

## 2022-04-01 RX ADMIN — LOSARTAN POTASSIUM 100 MG: 50 TABLET, FILM COATED ORAL at 08:04

## 2022-04-01 RX ADMIN — MEROPENEM 2 G: 1 INJECTION INTRAVENOUS at 09:04

## 2022-04-01 RX ADMIN — Medication 1 CAPSULE: at 08:04

## 2022-04-01 RX ADMIN — METOCLOPRAMIDE 5 MG: 5 TABLET ORAL at 10:04

## 2022-04-01 RX ADMIN — METOPROLOL TARTRATE 25 MG: 25 TABLET, FILM COATED ORAL at 08:04

## 2022-04-01 RX ADMIN — DICLOFENAC SODIUM 2 G: 10 GEL TOPICAL at 08:04

## 2022-04-01 RX ADMIN — AMLODIPINE BESYLATE 5 MG: 5 TABLET ORAL at 08:04

## 2022-04-01 RX ADMIN — CYANOCOBALAMIN TAB 1000 MCG 1000 MCG: 1000 TAB at 08:04

## 2022-04-01 RX ADMIN — OXYCODONE HYDROCHLORIDE 15 MG: 10 TABLET ORAL at 10:04

## 2022-04-01 RX ADMIN — FAMOTIDINE 20 MG: 20 TABLET ORAL at 08:04

## 2022-04-01 NOTE — PLAN OF CARE
04/01/22 1231   Post-Acute Status   Post-Acute Authorization Home Health;IV Infusion   Home Health Status Pending Clinical Review   IV Infusion Status Set-up Complete/Auth obtained  ([facility])   [facility] unable to provide service to patient's area, would be [facility] which patient has stated she does not want.    [facility]- referral sent in [facility] and notified [facility], message left for Herminia in admissions requesting call back.    1:59 PM  Patient denied by [facility] due to staff shortage.    Called [facility] agencies attempting to locate one that both services patient's location and is in network with insurance.    [facility]- spoke with Amrita in admissions who reports will review referral and check insurance to ensure they are in network. They do service patient's location.    2:40 PM  W/C swathi WOLFE 2:40PM    Called over 10+ home health agencies, patient receiving denials due to staffing or not in network. Some agencies unable to visit patient until mid next week. Patient will need to be seen tomorrow for reinforcement of teaching.    Spoke with Vicki at [facility] who reports will reach out to [facility] to check availability.    3:40 PM  Spoke with Beverly at [facility], reaching out to patient and [facility].    Mer Coleman, MARIANO  [facility]  Ext. [phone]

## 2022-04-01 NOTE — CONSULTS
Single lumen 81ZO1TM midline placed LEFT BASILIC vein. Max dwell date 4/30/2022, Lot# [id number].  Needle advanced into the vessel under real time ultrasound guidance.  Image recorded and saved.

## 2022-04-01 NOTE — PLAN OF CARE
[facility] - Telemetry Stepdown      HOME HEALTH ORDERS  FACE TO FACE ENCOUNTER    Patient Name: Patito Domingo  YOB: 1950    PCP: Yasir Myers Jr, MD   PCP Address: [address] [place] [address] / Helga AGUSTIN  PCP Phone Number: [phone]  PCP Fax: [phone]    Encounter Date: 3/27/22    Admit to Home Health    Diagnoses:  Active Hospital Problems    Diagnosis  POA    *Urinary tract infection without hematuria [N39.0]  Yes     Priority: 1 - High    Generalized abdominal pain [R10.84]  Yes     Priority: 2     History of ESBL E. 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       Follow Up Appointments:  Future Appointments   Date Time Provider Department Center   9/16/2022 10:00 AM Ervin Parikh MD [facility] UROLOGY [facility]       Allergies:  Review of patient's allergies indicates:   Allergen Reactions    Contrast media Hives, Itching and Swelling    Epinephrine Anaphylaxis     Can cause  a Carcinoid Crisis    Ibuprofen Hives, Itching and Swelling    Zofran [ondansetron hcl] Itching     And multiple other reactions    Iodinated contrast media     Sulfa (sulfonamide antibiotics) Hives, Itching and Swelling       Medications: Review discharge medications with patient and family and provide education.    Current Facility-Administered Medications   Medication Dose Route Frequency Provider Last Rate Last Admin    acetaminophen tablet 650 mg  650 mg Oral Q4H PRN Maribel ORTEGA  ALLISON Alvarez        albuterol-ipratropium 2.5 mg-0.5 mg/3 mL nebulizer solution 3 mL  3 mL Nebulization Q4H PRN Maribel Alvarez PA-C        amLODIPine tablet 5 mg  5 mg Oral Daily Mindy Brizuela PA-C   5 mg at 04/01/22 0801    atorvastatin tablet 40 mg  40 mg Oral QHS Maribel Alvarez PA-C   40 mg at 03/31/22 2052    bisacodyL suppository 10 mg  10 mg Rectal Daily PRN Maribel Alvarez PA-C        cyanocobalamin tablet 1,000 mcg  1,000 mcg Oral Daily Maribel Alvarez PA-C   1,000 mcg at 04/01/22 0801    dextrose 10% bolus 125 mL  12.5 g Intravenous PRN Irma Metcalf MD        dextrose 10% bolus 250 mL  25 g Intravenous PRN Irma Metcalf MD        diclofenac sodium 1 % gel 2 g  2 g Topical (Top) Daily Mindy Brizuela PA-C   2 g at 04/01/22 0801    diphenhydrAMINE capsule 25 mg  25 mg Oral Q6H PRN Mindy Brizuela PA-C   25 mg at 03/28/22 1340    enoxaparin injection 40 mg  40 mg Subcutaneous Daily Maribel Alvarez PA-C   40 mg at 03/31/22 1648    famotidine tablet 20 mg  20 mg Oral BID Maribel Alvarez PA-C   20 mg at 04/01/22 0800    glucagon (human recombinant) injection 1 mg  1 mg Intramuscular PRN Maribel Alvarez PA-C        glucose chewable tablet 16 g  16 g Oral PRN Maribel Alvarez PA-C        glucose chewable tablet 24 g  24 g Oral PRN Maribel Alvarez PA-C        hydrALAZINE tablet 25 mg  25 mg Oral Q8H PRN Maribel Alvarez PA-C   25 mg at 03/30/22 0800    insulin aspart U-100 pen 0-5 Units  0-5 Units Subcutaneous QID (AC + HS) PRN Maribel Alvarez PA-C        Lactobacillus rhamnosus GG capsule 1 capsule  1 capsule Oral Daily Mindy Brizuela PA-C   1 capsule at 04/01/22 0800    LIDOcaine 5 % patch 1 patch  1 patch Transdermal Q24H Mindy Brizuela PA-C   1 patch at 04/01/22 0923    loperamide capsule 4 mg  4 mg Oral TID Mindy Brizuela PA-C   4 mg at 04/01/22 0801    losartan tablet 100 mg  100 mg Oral Daily Maribel  JORDAN Alvarez PA-C   100 mg at 04/01/22 0801    meclizine tablet 25 mg  25 mg Oral TID PRN Maribel Alvarez PA-C        melatonin tablet 6 mg  6 mg Oral Nightly PRN Chloé Bowling MD   6 mg at 03/29/22 2137    meropenem (MERREM) 2 g in sodium chloride 0.9% 100 mL IVPB  2 g Intravenous Q12H Hien Dong PA-C 100 mL/hr at 04/01/22 0923 2 g at 04/01/22 0923    methocarbamoL tablet 500 mg  500 mg Oral TID Mindy Brizuela PA-C   500 mg at 03/31/22 2052    metoclopramide HCl tablet 5 mg  5 mg Oral TID AC Maribel Alvarez PA-C   5 mg at 04/01/22 0617    metoprolol tartrate (LOPRESSOR) tablet 25 mg  25 mg Oral BID Maribel Alvarez PA-C   25 mg at 04/01/22 0801    oxyCODONE immediate release tablet 15 mg  15 mg Oral Q4H PRN Maribel Alvarez PA-C   15 mg at 04/01/22 0617    polyethylene glycol packet 17 g  17 g Oral Daily PRN Maribel Alvarez PA-C        prochlorperazine injection Soln 5 mg  5 mg Intravenous Q6H PRN Maribel Alvarez PA-C        promethazine tablet 25 mg  25 mg Oral Q6H PRN Maribel Alvarez PA-C        sodium chloride 0.9% flush 10 mL  10 mL Intravenous PRN Chloé Bowling MD   10 mL at 03/31/22 1651     Current Discharge Medication List      START taking these medications    Details   Lactobacillus rhamnosus GG (CULTURELLE) 10 billion cell capsule Take 1 capsule by mouth once daily. for 10 days  Qty: 10 capsule, Refills: 0      MEROPENEM 1 G/100 ML NS, READY TO MIX SYSTEM, Inject 200 mLs (2 g total) into the vein 2 (two) times a day. First dose to be given on evening of 4/1/22. Last dose to be given on the evening of 4/10/22/ for 19 doses  Qty: 3800 mL, Refills: 0         CONTINUE these medications which have CHANGED    Details   amLODIPine (NORVASC) 5 MG tablet Take 1 tablet (5 mg total) by mouth once daily.  Qty: 30 tablet, Refills: 11    Comments: .         CONTINUE these medications which have NOT CHANGED    Details   alcohol swabs (BD ALCOHOL SWABS)  PadM Apply 1 each topically as needed.  Qty: 90 each, Refills: 0      atorvastatin (LIPITOR) 40 MG tablet Take 1 tablet (40 mg total) by mouth every evening.  Qty: 90 tablet, Refills: 3      BLOOD PRESSURE CUFF Misc 1 each by Misc.(Non-Drug; Combo Route) route once daily.  Qty: 1 each, Refills: 0      cyanocobalamin (VITAMIN B-12) 1000 MCG tablet Take 1 tablet (1,000 mcg total) by mouth once daily.  Qty: 30 tablet, Refills: 5    Associated Diagnoses: Low serum vitamin B12      losartan (COZAAR) 100 MG tablet Take 1 tablet (100 mg total) by mouth once daily.  Qty: 90 tablet, Refills: 0      magnesium oxide (MAG-OX) 400 mg (241.3 mg magnesium) tablet Take 1 tablet (400 mg total) by mouth 2 (two) times daily.  Qty: 60 tablet, Refills: 1      meclizine (ANTIVERT) 25 mg tablet TAKE 1 TABLET(25 MG) BY MOUTH THREE TIMES DAILY AS NEEDED FOR DIZZINESS  Qty: 30 tablet, Refills: 0      metoprolol tartrate (LOPRESSOR) 25 MG tablet TAKE 1 TABLET TWICE DAILY  Qty: 180 tablet, Refills: 0    Associated Diagnoses: Resistant hypertension      oxyCODONE (ROXICODONE) 15 MG Tab Take 15 mg by mouth every 4 (four) hours as needed (chronic abdominal pain, malignancy related).               I have seen and examined this patient within the last 30 days. My clinical findings that support the need for the home health skilled services and home bound status are the following:no   Weakness/numbness causing balance and gait disturbance due to Infection making it taxing to leave home.     Diet:   cardiac diet    Labs:  Order the following labs to be drawn on Mondays:   · CBC  · CMP   Fax Lab Results to Infectious Diseases Provider: Hien Dong   [facility] Fax Number: [phone]    Referrals/ Consults  Physical Therapy to evaluate and treat. Evaluate for home safety and equipment needs; Establish/upgrade home exercise program. Perform / instruct on therapeutic exercises, gait training, transfer training, and Range of  Motion.  Occupational Therapy to evaluate and treat. Evaluate home environment for safety and equipment needs. Perform/Instruct on transfers, ADL training, ROM, and therapeutic exercises.  Aide to provide assistance with personal care, ADLs, and vital signs.    Activities:   activity as tolerated    Nursing:   Agency to admit patient within 24 hours of hospital discharge unless specified on physician order or at patient request    SN to complete comprehensive assessment including routine vital signs. Instruct on disease process and s/s of complications to report to MD. Review/verify medication list sent home with the patient at time of discharge  and instruct patient/caregiver as needed. Frequency may be adjusted depending on start of care date.     Skilled nurse to perform up to 3 visits PRN for symptoms related to diagnosis    Notify MD if SBP > 160 or < 90; DBP > 90 or < 50; HR > 120 or < 50; Temp > 101; O2 < 88%    Ok to schedule additional visits based on staff availability and patient request on consecutive days within the home health episode.    When multiple disciplines ordered:    Start of Care occurs on Sunday - Wednesday schedule remaining discipline evaluations as ordered on separate consecutive days following the start of care.    Thursday SOC -schedule subsequent evaluations Friday and Monday the following week.     Friday - Saturday SOC - schedule subsequent discipline evaluations on consecutive days starting Monday of the following week.    For all post-discharge communication and subsequent orders please contact patient's primary care physician. If unable to reach primary care physician or do not receive response within 30 minutes, please contact [facility] for clinical staff order clarification    Miscellaneous      Infection: complicated ESBL klebsiella UTI     Discharge Antibiotics:   Intravenous antibiotics:  · Meropenem 2 g IV q 12 hours     Therapy Duration:  14  days  Estimated end date of IV antibiotics: 4/10/22    Home Health Aide:  Nursing Three times weekly, Physical Therapy Three times weekly, Occupational Therapy Three times weekly and Home Health Aide Three times weekly    Wound Care Orders  no    I certify that this patient is confined to her home and needs intermittent skilled nursing care, physical therapy and occupational therapy.

## 2022-04-01 NOTE — PT/OT/SLP PROGRESS
Occupational Therapy      Patient Name:  Patito Domingo   MRN:  [id number]    Patient not seen today secondary to  (patient stating awaiting discharge home with family, declined needs at this time.). Will follow-up on next available date should discharge not occur as planned.    4/1/2022

## 2022-04-01 NOTE — NURSING
Patient discharged, Meds delivered by Pharmacy, Picked up by TRISTEN Ventura , Patient DC with home health.

## 2022-04-01 NOTE — PLAN OF CARE
[facility] educator met with patient and son discussed discharge plan for home IVABX. Patito Domingo will dc home with family support. Patient will infuse medication via Elastomeric Pump. Patient and son educated on [facility].A.[facility] procedure. S.A.S.H mat provided.  Patient education checklist reviewed and acknowledged by above person(s) above and are agreeable to discharge with home infusion plan of care. IV administration process using aspetic technique was reviewed with successful return demonstration and few cues. Matias feels comfortable with administration.  Patient requires reinforcement of education as son would like her to disconnect medication after it's complete. Will send son online video and he will send to other family members.  Patient will dc home with merpenem 2 GM IV q 12 hours for estimated end of therapy date 4/10/22. Dosing schedule times are 8 am and 8 pm. Extension placed to  Left basilic 18 g x 8 cm midline. Pending HH accepting agency for weekly dressing changes and lab draws. Time allotted for questions. Patients nurse and case management team notified teaching has been completed.     Medication delivery  made to bedside    [facility]  Christopher Gomez Rn, Clinical Educator  Cell [phone]  Office [phone]  Fax [phone]

## 2022-04-01 NOTE — PT/OT/SLP PROGRESS
Physical Therapy      Patient Name:  Patito Domingo   MRN:  [id number]    Patient not seen today secondary to Other (Comment) (DC prior to PT session). Will follow-up N/A.

## 2022-04-01 NOTE — PLAN OF CARE
[facility] - Telemetry Stepdown  Discharge Final Note    Primary Care Provider: Yasir Myers Jr, MD    Expected Discharge Date: 4/1/2022    Final Discharge Note (most recent)     Final Note - 04/01/22 1607        Final Note    Assessment Type Final Discharge Note     Anticipated Discharge Disposition [place] [facility]     Hospital Resources/Appts/Education Provided Appointments scheduled and added to AVS        Post-Acute Status    Post-Acute Authorization Home Health;IV Infusion     Home Health Status Set-up Complete/Auth obtained   [facility]- patient reportedly agreed to service. Several home health agencies denied, only HH willing to accept is [facility]  at this time.    IV Infusion Status Set-up Complete/Auth obtained   [facility]    Discharge Delays None known at this time                 Important Message from Medicare  Important Message from Medicare regarding Discharge Appeal Rights: Given to patient/caregiver, Explained to patient/caregiver, Signed/date by patient/caregiver     Date IMM was signed: 04/01/22  Time IMM was signed: 1309    Contact Info     Yasir Myers Jr, MD   Specialty: Family Medicine   Relationship: PCP - General    [address] [place] [address]  [id number] [place] [id number]   Phone: [phone]       Next Steps: Follow up on 4/4/2022    Instructions: Established pt's hospital f/u visit on 4/4/22 @ 11:20am. Please bring discharge orders, ID, insurance card, and medication list.    Gonzales Bishop MD   Specialty: Hematology and Oncology   Relationship: PCP - Hematology/Oncology    [address] [facility] [address]  [address]  [facility] [address]   Phone: [phone]       Next Steps: Follow up        Mer Coleman LMSW  [facility]  Ext. [phone]

## 2022-04-04 ENCOUNTER — PATIENT OUTREACH (OUTPATIENT)
Dept: ADMINISTRATIVE | Facility: CLINIC | Age: 72
End: 2022-04-04
Payer: MEDICARE

## 2022-04-04 NOTE — PROGRESS NOTES
C3 nurse spoke with Patito Domingo for a TCC post hospital discharge follow up call. The patient has a scheduled [facility] appointment with KINSEY Eng on 4/8/2022 @ HOME.

## 2022-04-05 ENCOUNTER — OUTPATIENT CASE MANAGEMENT (OUTPATIENT)
Dept: ADMINISTRATIVE | Facility: OTHER | Age: 72
End: 2022-04-05
Payer: MEDICARE

## 2022-04-05 ENCOUNTER — LAB VISIT (OUTPATIENT)
Dept: LAB | Facility: HOSPITAL | Age: 72
End: 2022-04-05
Attending: INTERNAL MEDICINE
Payer: MEDICARE

## 2022-04-05 DIAGNOSIS — N39.0 UTI (URINARY TRACT INFECTION): Primary | ICD-10-CM

## 2022-04-05 LAB
ALBUMIN SERPL BCP-MCNC: 3.7 G/DL (ref 3.5–5.2)
ALP SERPL-CCNC: 117 U/L (ref 38–126)
ALT SERPL W/O P-5'-P-CCNC: 15 U/L (ref 10–44)
ANION GAP SERPL CALC-SCNC: 6 MMOL/L (ref 8–16)
AST SERPL-CCNC: 30 U/L (ref 15–46)
BASOPHILS # BLD AUTO: 0.04 K/UL (ref 0–0.2)
BASOPHILS NFR BLD: 0.9 % (ref 0–1.9)
BILIRUB SERPL-MCNC: 0.5 MG/DL (ref 0.1–1)
CALCIUM SERPL-MCNC: 8.8 MG/DL (ref 8.7–10.5)
CHLORIDE SERPL-SCNC: 102 MMOL/L (ref 95–110)
CO2 SERPL-SCNC: 31 MMOL/L (ref 23–29)
CREAT SERPL-MCNC: 0.79 MG/DL (ref 0.5–1.4)
DIFFERENTIAL METHOD: ABNORMAL
EOSINOPHIL # BLD AUTO: 0.2 K/UL (ref 0–0.5)
EOSINOPHIL NFR BLD: 5.3 % (ref 0–8)
ERYTHROCYTE [DISTWIDTH] IN BLOOD BY AUTOMATED COUNT: 13.6 % (ref 11.5–14.5)
EST. GFR  (AFRICAN AMERICAN): >60 ML/MIN/1.73 M^2
EST. GFR  (NON AFRICAN AMERICAN): >60 ML/MIN/1.73 M^2
GLUCOSE SERPL-MCNC: 83 MG/DL (ref 70–110)
HCT VFR BLD AUTO: 29.4 % (ref 37–48.5)
HGB BLD-MCNC: 9 G/DL (ref 12–16)
IMM GRANULOCYTES # BLD AUTO: 0.01 K/UL (ref 0–0.04)
IMM GRANULOCYTES NFR BLD AUTO: 0.2 % (ref 0–0.5)
LYMPHOCYTES # BLD AUTO: 1.2 K/UL (ref 1–4.8)
LYMPHOCYTES NFR BLD: 28 % (ref 18–48)
MCH RBC QN AUTO: 26.4 PG (ref 27–31)
MCHC RBC AUTO-ENTMCNC: 30.6 G/DL (ref 32–36)
MCV RBC AUTO: 86 FL (ref 82–98)
MONOCYTES # BLD AUTO: 0.5 K/UL (ref 0.3–1)
MONOCYTES NFR BLD: 11.6 % (ref 4–15)
NEUTROPHILS # BLD AUTO: 2.3 K/UL (ref 1.8–7.7)
NEUTROPHILS NFR BLD: 54 % (ref 38–73)
NRBC BLD-RTO: 0 /100 WBC
PLATELET # BLD AUTO: 237 K/UL (ref 150–450)
PMV BLD AUTO: 11.3 FL (ref 9.2–12.9)
POTASSIUM SERPL-SCNC: 3.9 MMOL/L (ref 3.5–5.1)
PROT SERPL-MCNC: 7.1 G/DL (ref 6–8.4)
RBC # BLD AUTO: 3.41 M/UL (ref 4–5.4)
SODIUM SERPL-SCNC: 139 MMOL/L (ref 136–145)
UUN UR-MCNC: 17 MG/DL (ref 7–17)
WBC # BLD AUTO: 4.32 K/UL (ref 3.9–12.7)

## 2022-04-05 PROCEDURE — 85025 COMPLETE CBC W/AUTO DIFF WBC: CPT | Mod: PO | Performed by: INTERNAL MEDICINE

## 2022-04-05 PROCEDURE — 36415 COLL VENOUS BLD VENIPUNCTURE: CPT | Mod: PO | Performed by: INTERNAL MEDICINE

## 2022-04-05 PROCEDURE — 80053 COMPREHEN METABOLIC PANEL: CPT | Mod: PO | Performed by: INTERNAL MEDICINE

## 2022-04-05 NOTE — PROGRESS NOTES
Case closed on this date due to goals being met.  Pt verbalized no further social work needs. Updated O[facility] RN re: case closure.

## 2022-04-06 ENCOUNTER — OFFICE VISIT (OUTPATIENT)
Dept: HOME HEALTH SERVICES | Facility: CLINIC | Age: 72
End: 2022-04-06
Payer: MEDICARE

## 2022-04-06 VITALS
SYSTOLIC BLOOD PRESSURE: 132 MMHG | HEART RATE: 77 BPM | DIASTOLIC BLOOD PRESSURE: 62 MMHG | RESPIRATION RATE: 18 BRPM | OXYGEN SATURATION: 97 %

## 2022-04-06 DIAGNOSIS — Z86.19 HISTORY OF ESBL E. COLI INFECTION: ICD-10-CM

## 2022-04-06 DIAGNOSIS — N30.00 ACUTE CYSTITIS WITHOUT HEMATURIA: Primary | ICD-10-CM

## 2022-04-06 DIAGNOSIS — I50.32 CHRONIC DIASTOLIC HEART FAILURE WITH PRESERVED EJECTION FRACTION: ICD-10-CM

## 2022-04-06 DIAGNOSIS — Z16.24 MULTIPLE DRUG RESISTANT ORGANISM (MDRO) CULTURE POSITIVE: ICD-10-CM

## 2022-04-06 PROCEDURE — 1126F PR PAIN SEVERITY QUANTIFIED, NO PAIN PRESENT: ICD-10-PCS | Mod: CPTII,S$GLB,, | Performed by: NURSE PRACTITIONER

## 2022-04-06 PROCEDURE — 3075F PR MOST RECENT SYSTOLIC BLOOD PRESS GE 130-139MM HG: ICD-10-PCS | Mod: CPTII,S$GLB,, | Performed by: NURSE PRACTITIONER

## 2022-04-06 PROCEDURE — 99350 HOME/RES VST EST HIGH MDM 60: CPT | Mod: S$GLB,,, | Performed by: NURSE PRACTITIONER

## 2022-04-06 PROCEDURE — 3078F DIAST BP <80 MM HG: CPT | Mod: CPTII,S$GLB,, | Performed by: NURSE PRACTITIONER

## 2022-04-06 PROCEDURE — 3075F SYST BP GE 130 - 139MM HG: CPT | Mod: CPTII,S$GLB,, | Performed by: NURSE PRACTITIONER

## 2022-04-06 PROCEDURE — 3044F HG A1C LEVEL LT 7.0%: CPT | Mod: CPTII,S$GLB,, | Performed by: NURSE PRACTITIONER

## 2022-04-06 PROCEDURE — 4010F PR ACE/ARB THEARPY RXD/TAKEN: ICD-10-PCS | Mod: CPTII,S$GLB,, | Performed by: NURSE PRACTITIONER

## 2022-04-06 PROCEDURE — 3078F PR MOST RECENT DIASTOLIC BLOOD PRESSURE < 80 MM HG: ICD-10-PCS | Mod: CPTII,S$GLB,, | Performed by: NURSE PRACTITIONER

## 2022-04-06 PROCEDURE — 1160F PR REVIEW ALL MEDS BY PRESCRIBER/CLIN PHARMACIST DOCUMENTED: ICD-10-PCS | Mod: CPTII,S$GLB,, | Performed by: NURSE PRACTITIONER

## 2022-04-06 PROCEDURE — 1159F MED LIST DOCD IN RCRD: CPT | Mod: CPTII,S$GLB,, | Performed by: NURSE PRACTITIONER

## 2022-04-06 PROCEDURE — 3044F PR MOST RECENT HEMOGLOBIN A1C LEVEL <7.0%: ICD-10-PCS | Mod: CPTII,S$GLB,, | Performed by: NURSE PRACTITIONER

## 2022-04-06 PROCEDURE — 1159F PR MEDICATION LIST DOCUMENTED IN MEDICAL RECORD: ICD-10-PCS | Mod: CPTII,S$GLB,, | Performed by: NURSE PRACTITIONER

## 2022-04-06 PROCEDURE — 1160F RVW MEDS BY RX/DR IN RCRD: CPT | Mod: CPTII,S$GLB,, | Performed by: NURSE PRACTITIONER

## 2022-04-06 PROCEDURE — 99350 PR HOME VISIT,ESTAB PATIENT,LEVEL IV: ICD-10-PCS | Mod: S$GLB,,, | Performed by: NURSE PRACTITIONER

## 2022-04-06 PROCEDURE — 1126F AMNT PAIN NOTED NONE PRSNT: CPT | Mod: CPTII,S$GLB,, | Performed by: NURSE PRACTITIONER

## 2022-04-06 PROCEDURE — 4010F ACE/ARB THERAPY RXD/TAKEN: CPT | Mod: CPTII,S$GLB,, | Performed by: NURSE PRACTITIONER

## 2022-04-06 RX ORDER — FLUCONAZOLE 150 MG/1
150 TABLET ORAL DAILY
Qty: 2 TABLET | Refills: 0 | Status: SHIPPED | OUTPATIENT
Start: 2022-04-06 | End: 2022-04-08

## 2022-04-06 NOTE — PROGRESS NOTES
[facility]  Transition of Care Home Visit    Visit Date: 4/6/2022  Encounter Provider: Amy Bright NP  PCP:  Yasir Myers Jr, MD    PRESENTING HISTORY      Patient ID: Patito Domingo is a 71 y.o. female.    Consult Requested By:  No ref. provider found  Reason for Consult:  Hospital Follow Up    Patito is being seen at home due to physical debility that presents a taxing effort to leave the home, to mitigate high risk of hospital readmission and/or due to the limited availability of reliable or safe options for transportation to the point of access to the provider. Prior to treatment on this visit the chart was reviewed and patient verbal consent was obtained.       Chief Complaint: Transitional Care and Urinary Tract Infection      History of Present Illness: Ms. Patito Domingo is a 71 y.o. female who was recently admitted to the hospital.    Admit: 3/27/22  Discharge: 4/3/22  Patito Domingo is a 71 y.o. female with a PMHx of HTN, HLD, HFpEF, carcinoid tumor of midgut, s/p CCY with recent ERCP 2/2 retained stones who presents to [facility] with complaints of abdominal pain. Patient reports intermittent RLQ abdominal pain with associated nausea, dry heaving, and decreased PO intake since her recent ERCP 3 weeks ago. She has difficulty describing characteristics of abdominal pain but says she thinks pain worsens after eating. She also reports several episodes of diffuse watery diarrhea for the last few days, says she had 6 episodes prior to arrival to the ED today. She took imodium 2 days ago without improvement. She denies dark/bloody stools but says her stool appears grey colored. Patient also complaints of urinary frequency, dysuria, and feeling hot/cold recently. Denies fever, chills, diaphoresis, vomiting, fever, cough, SOB, chest pain, dizziness, HA, or syncope.      ED: AFVSS. No leukocytosis. RUQ US and CT abd/pelvis without acute abnormalities. UA consistent with infection. Most recent Ucx  grew Klebsiella and Hafnia resistant to multiple organisms. Given IV meropenem based on recent Ucx sensitivities.         Overview/Hospital Course:  Patient admitted for UTI. Afebrile without leukocytosis. CT AP unremarkable. UA +. Started on IV meropenem based off of prior Ucx. Ucx here showing Klebsiella ESBL. ID consulted and switched to ertapenem as her organism is sensitive to this and it is a 1x day dose. However pt says it burned her arm, will see if patient can tolerate today. Also reported some diarrhea, c diff negative. Will start probiotic  ___________________________________________________________________    Today:    HPI:  Pt is being seen today for hospital follow up. See hospital course.    Today, she is AAOx3, NAD, VSS. Found in her apartment, ambulating with a walker. She reports she is feeling somewhat better. She continues to have some dysuria and frequency but it is improving. 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  She has a PICC line in her left arm that she reports home health changed dressing on yesterday. She reports one  Of her 4 sons come by daily and help her with the IV infusion for anti-biotics. She has 5 more days of IV therapy remaining. She does believe she is getting a yeast infection as she tends to get these with IV antibiotics. Reports appetite is good, denies any pain. Normal BM this morning    Review of Systems   Constitutional: Negative for activity change, fatigue and fever.   HENT: Negative.    Eyes:  Negative.    Respiratory: Negative for chest tightness.    Cardiovascular: Negative.  Negative for leg swelling.   Gastrointestinal: Negative.    Endocrine: Negative.    Genitourinary: Positive for dysuria and frequency.   Musculoskeletal: Positive for gait problem.   Skin: Negative.    Allergic/Immunologic: Negative.    Hematological: Negative.    Psychiatric/Behavioral: Negative.  Negative for agitation.   All other systems reviewed and are negative.      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;  Service: Urology;  Laterality: Right;    ERCP N/A 2021    Procedure: ERCP (ENDOSCOPIC RETROGRADE CHOLANGIOPANCREATOGRAPHY);  Surgeon: Jayjay Rivera MD;  Location: [address]);  Service: Endoscopy;  Laterality: N/A;    ERCP N/A 3/4/2022    Procedure: ERCP (ENDOSCOPIC RETROGRADE CHOLANGIOPANCREATOGRAPHY);  Surgeon: Roby Virgen MD;  Location: [address]);  Service: Endoscopy;  Laterality: N/A;    EYE SURGERY      HYSTERECTOMY  1996    LITHOTRIPSY      LIVER BIOPSY      carcinoid     URETEROSCOPY Right 10/10/2019    Procedure: URETEROSCOPY;  Surgeon: Gen Isbell MD;  [facility];  Service: Urology;  Laterality: Right;    UTERINE FIBROID SURGERY         Family History   Problem Relation Age of Onset    Cancer Mother         unknown    Alzheimer's disease Father     Stroke Sister     No Known Problems Son     No Known Problems Son     No Known Problems Son     No Known Problems Son     Kidney disease Neg Hx        Social History     Socioeconomic History    Marital status:    Occupational History    Occupation: disabled    Tobacco Use    Smoking status: Never Smoker    Smokeless tobacco: Never Used   Substance and Sexual Activity    Alcohol use: No     Alcohol/week: 0.0 standard drinks    Drug use: No    Sexual activity: Not Currently   Social History Narrative    Pt lives with son     Social Determinants of Health     Financial Resource Strain: Low Risk     Difficulty of Paying Living Expenses: Not very hard   Food Insecurity: No Food Insecurity    Worried About Running Out of Food in the Last Year: Never true    Ran Out of Food in the Last Year: Never true   Transportation Needs: No Transportation Needs    Lack of Transportation (Medical): No    Lack of Transportation (Non-Medical): No   Physical Activity: Inactive    Days of Exercise per Week: 0 days    Minutes of Exercise per Session: 0 min   Social Connections: Unknown    Marital Status:    Housing Stability: Low Risk     Unable to Pay for Housing in the Last Year: No    Number of Places Lived in the Last Year: 1    Unstable Housing in the Last Year: No       MEDICATIONS & ALLERGIES:     Current Outpatient Medications on File Prior to Visit   Medication Sig Dispense Refill    alcohol swabs (BD ALCOHOL SWABS) PadM Apply 1 each topically as needed. (Patient not taking: Reported on 4/4/2022) 90 each 0    amLODIPine (NORVASC) 5 MG tablet Take 1 tablet (5 mg total) by mouth  once daily. 30 tablet 11    atorvastatin (LIPITOR) 40 MG tablet Take 1 tablet (40 mg total) by mouth every evening. 90 tablet 3    BLOOD PRESSURE CUFF Misc 1 each by Misc.(Non-Drug; Combo Route) route once daily. 1 each 0    cyanocobalamin (VITAMIN B-12) 1000 MCG tablet Take 1 tablet (1,000 mcg total) by mouth once daily. 30 tablet 5    Lactobacillus rhamnosus GG (CULTURELLE) 10 billion cell capsule Take 1 capsule by mouth once daily. for 10 days 10 capsule 0    losartan (COZAAR) 100 MG tablet Take 1 tablet (100 mg total) by mouth once daily. 90 tablet 0    magnesium oxide (MAG-OX) 400 mg (241.3 mg magnesium) tablet Take 1 tablet (400 mg total) by mouth 2 (two) times daily. 60 tablet 1    meclizine (ANTIVERT) 25 mg tablet TAKE 1 TABLET(25 MG) BY MOUTH THREE TIMES DAILY AS NEEDED FOR DIZZINESS (Patient taking differently: Take 25 mg by mouth 3 (three) times daily as needed for Dizziness.) 30 tablet 0    MEROPENEM 1 G/100 ML NS, READY TO MIX SYSTEM, Inject 200 mLs (2 g total) into the vein 2 (two) times a day. First dose to be given on evening of 4/1/22. Last dose to be given on the evening of 4/10/22/ for 19 doses 3800 mL 0    metoprolol tartrate (LOPRESSOR) 25 MG tablet TAKE 1 TABLET TWICE DAILY (Patient taking differently: Take 25 mg by mouth 2 (two) times daily.) 180 tablet 0    oxyCODONE (ROXICODONE) 15 MG Tab Take 15 mg by mouth every 4 (four) hours as needed (chronic abdominal pain, malignancy related).       No current facility-administered medications on file prior to visit.        Review of patient's allergies indicates:   Allergen Reactions    Contrast media Hives, Itching and Swelling    Epinephrine Anaphylaxis     Can cause  a Carcinoid Crisis    Ibuprofen Hives, Itching and Swelling    Zofran [ondansetron hcl] Itching     And multiple other reactions    Iodinated contrast media     Sulfa (sulfonamide antibiotics) Hives, Itching and Swelling       OBJECTIVE:     Vital Signs:  Vitals:     04/06/22 0933   BP: 132/62   Pulse: 77   Resp: 18     There is no height or weight on file to calculate BMI.     Physical Exam:  Physical Exam  Vitals reviewed.   Constitutional:       General: She is not in acute distress.     Appearance: She is well-developed.      Comments: Frail, PICC line to left arm   HENT:      Head: Normocephalic and atraumatic.   Eyes:      Pupils: Pupils are equal, round, and reactive to light.   Cardiovascular:      Rate and Rhythm: Normal rate and regular rhythm.      Heart sounds: Normal heart sounds.   Pulmonary:      Effort: Pulmonary effort is normal.      Breath sounds: Normal breath sounds.   Abdominal:      General: Bowel sounds are normal.      Palpations: Abdomen is soft.   Musculoskeletal:         General: Normal range of motion.      Cervical back: Normal range of motion and neck supple.   Skin:     General: Skin is warm and dry.   Neurological:      Mental Status: She is alert and oriented to person, place, and time.      Cranial Nerves: No cranial nerve deficit.   Psychiatric:         Behavior: Behavior normal.         Thought Content: Thought content normal.         Judgment: Judgment normal.         Laboratory  Lab Results   Component Value Date    WBC 4.32 04/05/2022    HGB 9.0 (L) 04/05/2022    HCT 29.4 (L) 04/05/2022    MCV 86 04/05/2022     04/05/2022     Lab Results   Component Value Date    INR 1.0 03/02/2022    INR 1.1 07/03/2021    INR 1.0 06/25/2021     Lab Results   Component Value Date    HGBA1C 6.6 (H) 03/28/2022     No results for input(s): POCTGLUCOSE in the last 72 hours.    Diagnostic Results:      TRANSITION OF CARE:     [facility] Contact Note:     Family and/or Caretaker present at visit?  No.  Diagnostic tests reviewed/disposition: No diagnosic tests pending after this hospitalization.  Disease/illness education: UTI  Home health/community services discussion/referrals: Patient has home health established at [facility].   Establishment or  re-establishment of referral orders for community resources: No other necessary community resources.   Discussion with other health care providers: No discussion with other health care providers necessary.     Transition of Care Visit:     I have reviewed and updated the history and problem list.  I have reconciled the medication list.  I have discussed the hospitalization and current medical issues, prognosis and plans with the patient/family.  I  spent more than 50% of time discussing the care with the patient/family.  Total Face-to-Face Encounter: 60 minutes.    Medications Reconciliation:   I have reconciled the patient's home medications and discharge medications with the patient/family. I have updated all changes.  Refer to After-Visit Medication List.    ASSESSMENT & PLAN:     HIGH RISK CONDITION(S):      Patito was seen today for transitional care and urinary tract infection.    Diagnoses and all orders for this visit:    Acute cystitis without hematuria    Multiple drug resistant organism (MDRO) culture positive    History of ESBL E. coli infection    Chronic diastolic heart failure with preserved ejection fraction    Other orders  -     fluconazole (DIFLUCAN) 150 MG Tab; Take 1 tablet (150 mg total) by mouth once daily. Take one today and one when antibiotics are completed for 2 days    Complete antibiotics course as prescribed  Continue all medications as currently prescribed      Questions elicited and answered.  Contact information provided for any changes in condition or concerns      Were controlled substances prescribed?  No    Instructions for the patient:  - Continue all medications, treatments and therapies as ordered.   - Follow all instructions, recommendations as discussed.  - Maintain Safety Precautions at all times.  - Attend all medical appointments as scheduled.  - For worsening symptoms: call Primary Care Physician or Nurse Practitioner.  - For emergencies, call 911 or immediately report  to the nearest emergency room.  - Limit Risks of environmental exposure to coronavirus/COVID-19 as discussed including: social distancing, hand hygiene, and limiting departures from the home for necessities only.     Scheduled Follow-up :  Future Appointments   Date Time Provider Department Center   9/16/2022 10:00 AM Ervin Parikh MD [facility] UROLOGY [facility]       After Visit Medication List :     Medication List          Accurate as of April 6, 2022  2:36 PM. If you have any questions, ask your nurse or doctor.            START taking these medications    fluconazole 150 MG Tab  Commonly known as: DIFLUCAN  Take 1 tablet (150 mg total) by mouth once daily. Take one today and one when antibiotics are completed for 2 days  Started by: Amy Bright NP        CHANGE how you take these medications    meclizine 25 mg tablet  Commonly known as: ANTIVERT  TAKE 1 TABLET(25 MG) BY MOUTH THREE TIMES DAILY AS NEEDED FOR DIZZINESS  What changed:   · how much to take  · how to take this  · when to take this  · reasons to take this  · additional instructions        CONTINUE taking these medications    alcohol swabs Padm  Commonly known as: BD ALCOHOL SWABS  Apply 1 each topically as needed.     amLODIPine 5 MG tablet  Commonly known as: NORVASC  Take 1 tablet (5 mg total) by mouth once daily.     atorvastatin 40 MG tablet  Commonly known as: LIPITOR  Take 1 tablet (40 mg total) by mouth every evening.     BLOOD PRESSURE CUFF Misc  Generic drug: miscellaneous medical supply  1 each by Misc.(Non-Drug; Combo Route) route once daily.     cyanocobalamin 1000 MCG tablet  Commonly known as: VITAMIN B-12  Take 1 tablet (1,000 mcg total) by mouth once daily.     Lactobacillus rhamnosus GG 10 billion cell capsule  Commonly known as: CULTURELLE  Take 1 capsule by mouth once daily. for 10 days     losartan 100 MG tablet  Commonly known as: COZAAR  Take 1 tablet (100 mg total) by mouth once daily.     magnesium oxide 400 mg  (241.3 mg magnesium) tablet  Commonly known as: MAG-OX  Take 1 tablet (400 mg total) by mouth 2 (two) times daily.     MEROPENEM 1 G/100 ML NS (READY TO MIX SYSTEM)  Inject 200 mLs (2 g total) into the vein 2 (two) times a day. First dose to be given on evening of 4/1/22. Last dose to be given on the evening of 4/10/22/ for 19 doses     metoprolol tartrate 25 MG tablet  Commonly known as: LOPRESSOR  TAKE 1 TABLET TWICE DAILY     oxyCODONE 15 MG Tab  Commonly known as: ROXICODONE           Where to Get Your Medications      These medications were sent to [id number] - [place], [place] - [address] [address] AT [facility] OF [id number] [address]    Phone: [phone]   · fluconazole 150 MG Tab       Attestation: Screening criteria to assess the level of the patient's risk for infection with COVID-19 as recommended by the CDC at the time of the above documented home visit concluded appropriateness to proceed. Universal precautions were maintained at all times, including provider use of >60% alcohol gel hand  immediately prior to entry and upon departing the patient's home as well as cleaning of equipment used in home visit with antibacterial/germicidal disposable wipes.    Signature:  Amy Bright NP    Patient consent obtained prior to treatment

## 2022-04-08 ENCOUNTER — TELEPHONE (OUTPATIENT)
Dept: INFECTIOUS DISEASES | Facility: CLINIC | Age: 72
End: 2022-04-08
Payer: MEDICARE

## 2022-04-08 NOTE — TELEPHONE ENCOUNTER
----- Message from Lincoln Gaspar MA sent at 4/8/2022  9:15 AM CDT -----    ----- Message -----  From: Hien Dong PA-C  Sent: 4/7/2022   4:12 PM CDT  To: Jennie Small LPN    I can see Monday  ----- Message -----  From: Jnenie Small LPN  Sent: 4/7/2022   2:43 PM CDT  To: Hien Dong PA-C    Nothing available until the 18th  ----- Message -----  From: Hien Dong PA-C  Sent: 4/7/2022  12:47 PM CDT  To: Laura Ramos MA    Please schedule a follow up for next week for EOC  ----- Message -----  From: Laura Ramos MA  Sent: 4/7/2022   8:26 AM CDT  To: Hien Dong PA-C    Hi,  Following up on this pt.   Will be ending his IV ABX on 4/10/22. No follow appointment   Please advice   ----- Message -----  From: Hien Dong PA-C  Sent: 3/30/2022   3:21 PM CDT  To: Laura Ramos MA    Discharged:   Home health:   Infusion company:    Infection: ESBL UTI  Antibiotics: Meropenem x 14 days    EOC: 4/10/22      EOC follow up:  Review until follow up:     Reviewer notes:

## 2022-04-11 ENCOUNTER — OFFICE VISIT (OUTPATIENT)
Dept: INFECTIOUS DISEASES | Facility: CLINIC | Age: 72
End: 2022-04-11
Payer: MEDICARE

## 2022-04-11 ENCOUNTER — OUTPATIENT CASE MANAGEMENT (OUTPATIENT)
Dept: ADMINISTRATIVE | Facility: OTHER | Age: 72
End: 2022-04-11
Payer: MEDICARE

## 2022-04-11 ENCOUNTER — INFUSION (OUTPATIENT)
Dept: INFECTIOUS DISEASES | Facility: HOSPITAL | Age: 72
End: 2022-04-11
Attending: INTERNAL MEDICINE
Payer: MEDICARE

## 2022-04-11 VITALS
WEIGHT: 176.38 LBS | DIASTOLIC BLOOD PRESSURE: 69 MMHG | BODY MASS INDEX: 28.34 KG/M2 | SYSTOLIC BLOOD PRESSURE: 176 MMHG | HEIGHT: 66 IN | TEMPERATURE: 99 F | HEART RATE: 58 BPM

## 2022-04-11 DIAGNOSIS — N30.00 ACUTE CYSTITIS WITHOUT HEMATURIA: Primary | ICD-10-CM

## 2022-04-11 DIAGNOSIS — Z16.24 MULTIPLE DRUG RESISTANT ORGANISM (MDRO) CULTURE POSITIVE: ICD-10-CM

## 2022-04-11 DIAGNOSIS — N20.0 KIDNEY STONES: ICD-10-CM

## 2022-04-11 PROCEDURE — 1101F PR PT FALLS ASSESS DOC 0-1 FALLS W/OUT INJ PAST YR: ICD-10-PCS | Mod: CPTII,S$GLB,, | Performed by: PHYSICIAN ASSISTANT

## 2022-04-11 PROCEDURE — 1159F MED LIST DOCD IN RCRD: CPT | Mod: CPTII,S$GLB,, | Performed by: PHYSICIAN ASSISTANT

## 2022-04-11 PROCEDURE — 3044F PR MOST RECENT HEMOGLOBIN A1C LEVEL <7.0%: ICD-10-PCS | Mod: CPTII,S$GLB,, | Performed by: PHYSICIAN ASSISTANT

## 2022-04-11 PROCEDURE — 3077F SYST BP >= 140 MM HG: CPT | Mod: CPTII,S$GLB,, | Performed by: PHYSICIAN ASSISTANT

## 2022-04-11 PROCEDURE — 99999 PR PBB SHADOW E&M-EST. PATIENT-LVL III: ICD-10-PCS | Mod: PBBFAC,,, | Performed by: PHYSICIAN ASSISTANT

## 2022-04-11 PROCEDURE — 1125F PR PAIN SEVERITY QUANTIFIED, PAIN PRESENT: ICD-10-PCS | Mod: CPTII,S$GLB,, | Performed by: PHYSICIAN ASSISTANT

## 2022-04-11 PROCEDURE — 3078F PR MOST RECENT DIASTOLIC BLOOD PRESSURE < 80 MM HG: ICD-10-PCS | Mod: CPTII,S$GLB,, | Performed by: PHYSICIAN ASSISTANT

## 2022-04-11 PROCEDURE — 1111F PR DISCHARGE MEDS RECONCILED W/ CURRENT OUTPATIENT MED LIST: ICD-10-PCS | Mod: CPTII,S$GLB,, | Performed by: PHYSICIAN ASSISTANT

## 2022-04-11 PROCEDURE — 3008F BODY MASS INDEX DOCD: CPT | Mod: CPTII,S$GLB,, | Performed by: PHYSICIAN ASSISTANT

## 2022-04-11 PROCEDURE — 3044F HG A1C LEVEL LT 7.0%: CPT | Mod: CPTII,S$GLB,, | Performed by: PHYSICIAN ASSISTANT

## 2022-04-11 PROCEDURE — 99999 PR PBB SHADOW E&M-EST. PATIENT-LVL III: CPT | Mod: PBBFAC,,, | Performed by: PHYSICIAN ASSISTANT

## 2022-04-11 PROCEDURE — 3078F DIAST BP <80 MM HG: CPT | Mod: CPTII,S$GLB,, | Performed by: PHYSICIAN ASSISTANT

## 2022-04-11 PROCEDURE — 4010F ACE/ARB THERAPY RXD/TAKEN: CPT | Mod: CPTII,S$GLB,, | Performed by: PHYSICIAN ASSISTANT

## 2022-04-11 PROCEDURE — 3077F PR MOST RECENT SYSTOLIC BLOOD PRESSURE >= 140 MM HG: ICD-10-PCS | Mod: CPTII,S$GLB,, | Performed by: PHYSICIAN ASSISTANT

## 2022-04-11 PROCEDURE — 1160F RVW MEDS BY RX/DR IN RCRD: CPT | Mod: CPTII,S$GLB,, | Performed by: PHYSICIAN ASSISTANT

## 2022-04-11 PROCEDURE — 1125F AMNT PAIN NOTED PAIN PRSNT: CPT | Mod: CPTII,S$GLB,, | Performed by: PHYSICIAN ASSISTANT

## 2022-04-11 PROCEDURE — 1101F PT FALLS ASSESS-DOCD LE1/YR: CPT | Mod: CPTII,S$GLB,, | Performed by: PHYSICIAN ASSISTANT

## 2022-04-11 PROCEDURE — 3008F PR BODY MASS INDEX (BMI) DOCUMENTED: ICD-10-PCS | Mod: CPTII,S$GLB,, | Performed by: PHYSICIAN ASSISTANT

## 2022-04-11 PROCEDURE — 3288F PR FALLS RISK ASSESSMENT DOCUMENTED: ICD-10-PCS | Mod: CPTII,S$GLB,, | Performed by: PHYSICIAN ASSISTANT

## 2022-04-11 PROCEDURE — 3288F FALL RISK ASSESSMENT DOCD: CPT | Mod: CPTII,S$GLB,, | Performed by: PHYSICIAN ASSISTANT

## 2022-04-11 PROCEDURE — 1160F PR REVIEW ALL MEDS BY PRESCRIBER/CLIN PHARMACIST DOCUMENTED: ICD-10-PCS | Mod: CPTII,S$GLB,, | Performed by: PHYSICIAN ASSISTANT

## 2022-04-11 PROCEDURE — 4010F PR ACE/ARB THEARPY RXD/TAKEN: ICD-10-PCS | Mod: CPTII,S$GLB,, | Performed by: PHYSICIAN ASSISTANT

## 2022-04-11 PROCEDURE — 99213 PR OFFICE/OUTPT VISIT, EST, LEVL III, 20-29 MIN: ICD-10-PCS | Mod: S$GLB,,, | Performed by: PHYSICIAN ASSISTANT

## 2022-04-11 PROCEDURE — 1159F PR MEDICATION LIST DOCUMENTED IN MEDICAL RECORD: ICD-10-PCS | Mod: CPTII,S$GLB,, | Performed by: PHYSICIAN ASSISTANT

## 2022-04-11 PROCEDURE — 99213 OFFICE O/P EST LOW 20 MIN: CPT | Mod: S$GLB,,, | Performed by: PHYSICIAN ASSISTANT

## 2022-04-11 PROCEDURE — 1111F DSCHRG MED/CURRENT MED MERGE: CPT | Mod: CPTII,S$GLB,, | Performed by: PHYSICIAN ASSISTANT

## 2022-04-11 NOTE — PROGRESS NOTES
Pt arrived to infusion suite for midline removal.  Midline to KENNEDI noted with transparent dressing with biopatch intact, no redness or swelling present.  Midline removed as ordered, tolerated well.  Vaseline gauze, 2x2 pads and coban applied to site, understood to remove dressing in 24 hrs., call MD if uncontrollable bleeding occurs and hold pressure. Left unit in NAD.

## 2022-04-11 NOTE — PROGRESS NOTES
Outpatient Care Management  Plan of Care Follow Up Visit    Patient: Patito Domingo  MRN: [id number]  Date of Service: 04/11/2022  Completed by: Marcie Thrasher RN  Referral Date: 02/10/2022  Program:     Reason for Visit   Patient presents with    OPCM Chart Review    Follow-up    Update Plan Of Care       Brief Summary:   Pt to inf disease and the iv was removed  Pt has rx for diflucan at pharm cost is 26 cents  And she has the culturelle at pharmacy to   Pt will call and get a sooner appt with urology after easter    Next steps from previous encounter  Fu on need for sooner urology appt  Review educational information on uti and yeast infection  Fu on np visit on Friday noted  Fu on appt with urology  Fu on bp trends  Interventions  Chart reviewed  Reviewed upcoming appt schedule    Assess/review short term goals met       Next steps  Did pt start probiotics  Any new symptoms of uti or yeast infection      Patient Summary     Involvement of Care:  Do I have permission to speak with other family members about your care?       Patient Reported Labs & Vitals:  1.  Any Patient Reported Labs & Vitals?     2.  Patient Reported Blood Pressure:     3.  Patient Reported Pulse:     4.  Patient Reported Weight (Kg):     5.  Patient Reported Blood Glucose (mg/dl):       Medical and social history was reviewed with patient and/or caregiver.     Clinical Assessment     Reviewed and provided basic information on available community resources for mental health, transportation, wellness resources, and palliative care programs with patient and/or caregiver.     Complex Care Plan     Care plan was discussed and completed today with input from patient and/or caregiver.    Patient Instructions     Instructions were provided via the [employer] patient resources and are available for the patient to view on the patient portal.      Todays OPCM Self-Management Care Plan was developed with the patients/caregivers input and was  based on identified barriers from todays assessment.  Goals were written today with the patient/caregiver and the patient has agreed to work towards these goals to improve his/her overall well-being. Patient verbalized understanding of the care plan, goals, and all of today's instructions. Encouraged patient/caregiver to communicate with his/her physician and health care team about health conditions and the treatment plan.  Provided my contact information today and encouraged patient/caregiver to call me with any questions as needed.

## 2022-04-11 NOTE — PROGRESS NOTES
Subjective:      Patient ID: Patito Domingo is a 71 y.o. female.    Chief Complaint: Hospital follow up    History of Present Illness    HPI      Mrs. Domingo is a 71 year old female with history of HTN, HFpEF, carcinoid tumor of abdomen, chronic bilateral renal stones,  recurrent ESBL UTIs followed by urology, prior cholecystectomy, choledocholithiasis with recent ERCP/biliary sphincterotomy on 3/4 who presented to [facility] on 3/27 with complaints of abdominal pain, diarrhea and dysuria and admitted for a UTI.    During hospital stay C diff testing returned negative. UA positive for pyuria and urine cx returned positive for ESBL Klebsiella pneumoniae (S carbapenems only).  RUQ US and CT abd/pelvis without acute abnormalities. No choledocholithiasis. No hydronephrosis. Non obstucting renal stones present and stable. Patient improved on IV antibiotics. She tolerated  Meropenem better than Ertapenem. Given multiple recurrences of ESBL klebsiella UTIs and presence of non obstructing renal stones, decision was made to treat as a complicated UTI with Meropenem x 14 days (end 4/10).    Patient is seen today for follow up. She is feeling better since discharge. Denies fevers, chills, sweats. Chronic abdominal pain has improved. Diarrhea, dysuria, burning with urination resolved.  Tolerated Meropenem other than odd taste in her mouth.        Review of Systems   Constitutional: Negative for chills, fever, malaise/fatigue and night sweats.   HENT: Positive for sore throat. Negative for congestion.    Eyes: Negative for blurred vision and visual disturbance.   Cardiovascular: Negative for chest pain and leg swelling.   Respiratory: Negative for cough, shortness of breath and sputum production.    Skin: Negative for color change, dry skin and itching.   Musculoskeletal: Positive for back pain and myalgias. Negative for joint pain and joint swelling.   Gastrointestinal: Positive for abdominal pain (chronic). Negative for  "diarrhea, heartburn, nausea and vomiting.   Genitourinary: Negative for dysuria, flank pain and hematuria.   Neurological: Positive for headaches. Negative for dizziness, numbness and weakness.   Psychiatric/Behavioral: Negative for altered mental status and depression. The patient is not nervous/anxious.      Objective:     Vitals:    04/11/22 0951   BP: (!) 176/69   Pulse: (!) 58   Temp: 98.7 °F (37.1 °C)   TempSrc: Oral   Weight: 80 kg (176 lb 5.9 oz)   Height: 5' 6" (1.676 m)   PainSc:   7     Physical Exam  Constitutional:       General: She is not in acute distress.     Appearance: She is not ill-appearing, toxic-appearing or diaphoretic.   HENT:      Head: Normocephalic and atraumatic.      Nose: Nose normal. No congestion.   Eyes:      General: No scleral icterus.     Conjunctiva/sclera: Conjunctivae normal.   Cardiovascular:      Rate and Rhythm: Normal rate and regular rhythm.   Pulmonary:      Effort: Pulmonary effort is normal. No respiratory distress.   Abdominal:      General: There is no distension.      Palpations: Abdomen is soft.      Tenderness: There is abdominal tenderness. There is no right CVA tenderness or left CVA tenderness.   Skin:     General: Skin is warm and dry.      Coloration: Skin is not jaundiced.      Findings: No erythema or rash.      Comments: Midline c/d/i   Neurological:      Mental Status: She is alert and oriented to person, place, and time.   Psychiatric:         Mood and Affect: Mood normal.         Behavior: Behavior normal.         Thought Content: Thought content normal.         Judgment: Judgment normal.       Assessment:       1. Acute cystitis without hematuria    2. Multiple drug resistant organism (MDRO) culture positive    3. Kidney stones          Plan:     Patient has completed 14 days of IV antibiotics with resolution of her urinary symptoms. Will arrange midline removal today and monitor off of antibiotics. If patient develops a recurrence of ESBL Klebsiella " UTI,  suspect her non obstructing renal stones may be colonized and predisposing her to recurrent infections and would consider urology evaluation for management of her stones.    The patient understands and agrees with the plan of care. All questions were answered. RTC as needed          20 minutes of total time was spent on this encounter, which includes face to face time and non-face to face time preparing to see the patient (eg, review of tests), Obtaining and/or reviewing separately obtained history, documenting clinical information in the electronic or other health record, independently interpreting results (not separately reported) and communicating results to the patient/family/caregiver, or care coordination (not separately reported).

## 2022-04-12 RX ORDER — NYSTATIN 100000 [USP'U]/ML
4 SUSPENSION ORAL
Qty: 160 ML | Refills: 0 | Status: SHIPPED | OUTPATIENT
Start: 2022-04-12 | End: 2022-04-22

## 2022-04-12 NOTE — DISCHARGE SUMMARY
[facility] - Telemetry [facility] Medicine  Discharge Summary      Patient Name: Patito Domingo  MRN: [id number]  Patient Class: IP- Inpatient  Admission Date: 3/27/2022  Hospital Length of Stay: 3 days  Discharge Date and Time: 4/1/2022  3:23 PM  Attending Physician: No att. providers found   Discharging Provider: Angelica Hood PA-C  Primary Care Provider: Yasir Myers Jr, MD  Hospital Medicine Team: [facility]OSP MED F Angelica Hood PA-C    HPI:   Patito Domingo is a 71 y.o. female with a PMHx of HTN, HLD, HFpEF, carcinoid tumor of midgut, s/p CCY with recent ERCP 2/2 retained stones who presents to [facility] with complaints of abdominal pain. Patient reports intermittent RLQ abdominal pain with associated nausea, dry heaving, and decreased PO intake since her recent ERCP 3 weeks ago. She has difficulty describing characteristics of abdominal pain but says she thinks pain worsens after eating. She also reports several episodes of diffuse watery diarrhea for the last few days, says she had 6 episodes prior to arrival to the ED today. She took imodium 2 days ago without improvement. She denies dark/bloody stools but says her stool appears grey colored. Patient also complaints of urinary frequency, dysuria, and feeling hot/cold recently. Denies fever, chills, diaphoresis, vomiting, fever, cough, SOB, chest pain, dizziness, HA, or syncope.     ED: AFVSS. No leukocytosis. RUQ US and CT abd/pelvis without acute abnormalities. UA consistent with infection. Most recent Ucx grew Klebsiella and Hafnia resistant to multiple organisms. Given IV meropenem based on recent Ucx sensitivities.       * No surgery found *      Hospital Course:   Patient admitted for UTI. Afebrile without leukocytosis. CT AP unremarkable. UA +. Started on IV meropenem based off of prior Ucx. Ucx here showing Klebsiella ESBL. ID consulted and switched to ertapenem as her organism is sensitive to this and it is a 1x day dose. However pt says  it burned her arm, will see if patient can tolerate today. Ultimately switched to meropenem due to adverse effects. Also reported some diarrhea, c diff negative. Will start probiotic. PT/OT consulted, recommend SNF. Patient states she will not go to a SNF unless it is with [facility]. Plan for discharge home with IV abx per patient's and family's request. Last day of IV meropenem on 4/10/22. Stable for discharge with ID and PCP follow-up. Referral placed to urology for chronic nephrolithiasis. Return precautions given and patient verbalized understanding.       Goals of Care Treatment Preferences:  Code Status: Full Code      Consults:   Consults (From admission, onward)        Status Ordering Provider     Inpatient consult to Midline team  Once        Provider:  (Not yet assigned)    Completed JOSE HSIEH     Inpatient consult to Midline team  Once        Provider:  (Not yet assigned)    Completed ONEYDA NOLASCO     Inpatient consult to Infectious Diseases  Once        Provider:  (Not yet assigned)    Completed DARSHANA FINN          * Urinary tract infection without hematuria  History of ESBL E. coli infection  Multiple drug resistant organism (MDRO) culture positive  - hx recurrent MDRO UTIs including ESBL   - afebrile without leukocytosis  - UA infectious, + dysuria  - given IV meropenem in the ED based on prior UCx sensitivities, will continue  - ID consulted for further antibiotic clearance- switched to ertapenem however patient had pain with administration so switched back to merropenem  - follow UCx- klebs ESBL, will need 14-day total course of meropenem (last dose on 4/10/22)  - midline placed for home infusions  - prescribed probiotic    Generalized abdominal pain  - suspect largely due to abdominal carcinoid tumor and UTI contributing, though some c/f c.diff given multiple episodes of diffuse diarrhea   - LFTs and lipase WNL  - RUQ US and CT abd/pelvis without etiology of abd pain  - c.diff  negative  - continue home pain meds      Essential hypertension  - continue amlodipine 2.5mg daily, losartan 100mg daily and MTP 25mg BID    Malignant carcinoid tumor of midgut  - likely cause/ contributing to abdominal pain  - continue to follow-up with with heme/onc as outpatient  - continue home PRN oxycodone    Chronic diastolic heart failure with preserved ejection fraction  - continue ARB, BB    Vertigo  - continue PRN meclizine    HLD (hyperlipidemia)  - continue statin     Nephrolithiasis          Final Active Diagnoses:    Diagnosis Date Noted POA    PRINCIPAL PROBLEM:  Urinary tract infection without hematuria [N39.0] 04/28/2018 Yes    Generalized abdominal pain [R10.84] 01/14/2020 Yes    Essential hypertension [I10] 11/19/2019 Yes     Chronic    Malignant carcinoid tumor of midgut [C7A.095]  Yes    Chronic diastolic heart failure with preserved ejection fraction [I50.32] 03/02/2022 Yes    History of ESBL E. coli infection [Z86.19] 03/27/2022 Yes    Multiple drug resistant organism (MDRO) culture positive [Z16.24] 03/27/2022 Yes    Diarrhea [R19.7] 04/11/2021 Yes    Vertigo [R42]  Yes    HLD (hyperlipidemia) [E78.5] 03/08/2021 Yes    Nephrolithiasis [N20.0] 12/01/2014 Yes     Chronic      Problems Resolved During this Admission:       Discharged Condition: fair    Disposition: Home or Self Care    Follow Up:   Follow-up Information     Yasir Myers Jr, MD Follow up on 4/4/2022.    Specialty: Family Medicine  Why: [facility] f/u visit on 4/4/22 @ 11:20am. Please bring discharge orders, ID, insurance card, and medication list.  Contact information:  [address]  [address]   [place] [place] [facility] [address] [phone]             Gonzales Bishop MD .    Specialty: Hematology and Oncology  Contact information:  [address] [facility] [address]  [address]  [facility] [address] [phone]                       Patient Instructions:      Ambulatory referral/consult to Infectious Disease   Standing  Status: Future   Referral Priority: Routine Referral Type: Consultation   Referral Reason: Specialty Services Required   Requested Specialty: Infectious Diseases   Number of Visits Requested: 1     Ambulatory referral/consult to Urology   Standing Status: Future   Referral Priority: Routine Referral Type: Consultation   Referral Reason: Specialty Services Required   Requested Specialty: Urology   Number of Visits Requested: 1     Notify your health care provider if you experience any of the following:  temperature >100.4     Notify your health care provider if you experience any of the following:  persistent nausea and vomiting or diarrhea     Notify your health care provider if you experience any of the following:  increased confusion or weakness     Activity as tolerated       Significant Diagnostic Studies:   Imaging  1. CT abdomen pelvis without contract  Impression:  No definite abnormality seen to explain patient's severe pain after ERCP.  Cholecystectomy.  Bilateral numerous nonobstructive kidney calculi  Prior partial colectomy.  Unchanged soft tissue mass within the right lower abdominal quadrant.  Note that the patency of the mesenteric vasculature cannot be assessed on noncontrast images.  2. US abdomen limited  Impression:  Mildly dilated central intrahepatic bile ducts.  Common bile duct is normal caliber status post cholecystectomy.  No choledocholithiasis.  Right hepatic lobe lesion in keeping with known hepatic metastatic disease, better characterized on above comparison MRI.    Urine culture: KLEBSIELLA PNEUMONIAE ESBL   >100,000 cfu/ml, sensitive to ertapenem and meropenem only    Pending Diagnostic Studies:     None         Medications:  Reconciled Home Medications:      Medication List      START taking these medications    Lactobacillus rhamnosus GG 10 billion cell capsule  Commonly known as: CULTURELLE  Take 1 capsule by mouth once daily. for 10 days        CHANGE how you take these medications     amLODIPine 5 MG tablet  Commonly known as: NORVASC  Take 1 tablet (5 mg total) by mouth once daily.  What changed:   · medication strength  · how much to take     meclizine 25 mg tablet  Commonly known as: ANTIVERT  TAKE 1 TABLET(25 MG) BY MOUTH THREE TIMES DAILY AS NEEDED FOR DIZZINESS  What changed:   · how much to take  · how to take this  · when to take this  · reasons to take this  · additional instructions        CONTINUE taking these medications    alcohol swabs Padm  Commonly known as: BD ALCOHOL SWABS  Apply 1 each topically as needed.     atorvastatin 40 MG tablet  Commonly known as: LIPITOR  Take 1 tablet (40 mg total) by mouth every evening.     BLOOD PRESSURE CUFF Misc  Generic drug: miscellaneous medical supply  1 each by Misc.(Non-Drug; Combo Route) route once daily.     cyanocobalamin 1000 MCG tablet  Commonly known as: VITAMIN B-12  Take 1 tablet (1,000 mcg total) by mouth once daily.     losartan 100 MG tablet  Commonly known as: COZAAR  Take 1 tablet (100 mg total) by mouth once daily.     magnesium oxide 400 mg (241.3 mg magnesium) tablet  Commonly known as: MAG-OX  Take 1 tablet (400 mg total) by mouth 2 (two) times daily.     metoprolol tartrate 25 MG tablet  Commonly known as: LOPRESSOR  TAKE 1 TABLET TWICE DAILY     oxyCODONE 15 MG Tab  Commonly known as: ROXICODONE  Take 15 mg by mouth every 4 (four) hours as needed (chronic abdominal pain, malignancy related).        ASK your doctor about these medications    MEROPENEM 1 G/100 ML NS (READY TO MIX SYSTEM)  Inject 200 mLs (2 g total) into the vein 2 (two) times a day. First dose to be given on evening of 4/1/22. Last dose to be given on the evening of 4/10/22/ for 19 doses  Ask about: Should I take this medication?            Indwelling Lines/Drains at time of discharge:   Lines/Drains/Airways     None                 Time spent on the discharge of patient: 35 minutes     Discussed patient's plan of care with Dr. Parth Hood,  [facility]  Department of Hospital Medicine  [facility] - Telemetry Stepdown

## 2022-04-12 NOTE — ASSESSMENT & PLAN NOTE
History of ESBL E. coli infection  Multiple drug resistant organism (MDRO) culture positive  - hx recurrent MDRO UTIs including ESBL   - afebrile without leukocytosis  - UA infectious, + dysuria  - given IV meropenem in the ED based on prior UCx sensitivities, will continue  - ID consulted for further antibiotic clearance- switched to ertapenem however patient had pain with administration so switched back to merropenem  - follow UCx- klebs ESBL, will need 14-day total course of meropenem (last dose on 4/10/22)  - midline placed for home infusions  - prescribed probiotic

## 2022-04-12 NOTE — ASSESSMENT & PLAN NOTE
- likely cause/ contributing to abdominal pain  - continue to follow-up with with heme/onc as outpatient  - continue home PRN oxycodone

## 2022-04-12 NOTE — ASSESSMENT & PLAN NOTE
- suspect largely due to abdominal carcinoid tumor and UTI contributing, though some c/f c.diff given multiple episodes of diffuse diarrhea   - LFTs and lipase WNL  - RUQ US and CT abd/pelvis without etiology of abd pain  - c.diff negative  - continue home pain meds

## 2022-04-13 ENCOUNTER — TELEPHONE (OUTPATIENT)
Dept: FAMILY MEDICINE | Facility: CLINIC | Age: 72
End: 2022-04-13
Payer: MEDICARE

## 2022-04-13 NOTE — TELEPHONE ENCOUNTER
----- Message from Bella Franco sent at 4/13/2022  9:48 AM CDT -----  Contact: [phone]/ Self  Type: Requesting to speak with nurse    Who Called: Pt   Regarding: requesting medication for diarrhea and refill on diclofenac sodium 1 % gel 2 g     Would the patient rather a call back or a response via [facility]? Call back  Best Call Back Number: [phone]  Additional Information: [facility]

## 2022-04-14 RX ORDER — DICLOFENAC SODIUM 10 MG/G
2 GEL TOPICAL 4 TIMES DAILY PRN
Qty: 100 G | Refills: 0 | Status: SHIPPED | OUTPATIENT
Start: 2022-04-14 | End: 2022-06-06 | Stop reason: SDUPTHER

## 2022-04-23 DIAGNOSIS — I1A.0 RESISTANT HYPERTENSION: ICD-10-CM

## 2022-04-23 NOTE — TELEPHONE ENCOUNTER
Refill Routing Note   Medication(s) are not appropriate for processing by [facility] for the following reason(s):      - Required laboratory values are outdated  - Patient has been seen in the ED/Hospital since the last PCP visit    [facility] action(s):  Defer          Medication reconciliation completed: No     Appointments  past 12m or future 3m with PCP    Date Provider   Last Visit   3/17/2022 Yasir Myers Jr., MD   Next Visit   Visit date not found Yasir Myers Jr., MD   ED visits in past 90 days: 2        Note composed:1:12 AM 04/23/2022

## 2022-04-23 NOTE — TELEPHONE ENCOUNTER
Care Due:                  Date            Visit Type   Department     Provider  --------------------------------------------------------------------------------                                [facility]  Last Visit: 03-      FOLLOW UP    FAMILY Sang Myers  Next Visit: None Scheduled  None         None Found                                                            Last  Test          Frequency    Reason                     Performed    Due Date  --------------------------------------------------------------------------------    Lipid Panel.  12 months..  atorvastatin.............  03- 03-    Powered by [employer]. Reference number: [id number].   4/23/2022 12:28:50 AM CDT

## 2022-04-24 RX ORDER — LOSARTAN POTASSIUM 100 MG/1
100 TABLET ORAL DAILY
Qty: 90 TABLET | Refills: 0 | Status: SHIPPED | OUTPATIENT
Start: 2022-04-24 | End: 2022-07-10

## 2022-04-24 RX ORDER — METOPROLOL TARTRATE 25 MG/1
TABLET, FILM COATED ORAL
Qty: 180 TABLET | Refills: 0 | Status: SHIPPED | OUTPATIENT
Start: 2022-04-24 | End: 2022-06-21

## 2022-04-25 ENCOUNTER — OUTPATIENT CASE MANAGEMENT (OUTPATIENT)
Dept: ADMINISTRATIVE | Facility: OTHER | Age: 72
End: 2022-04-25
Payer: MEDICARE

## 2022-04-25 NOTE — PROGRESS NOTES
Outpatient Care Management  Plan of Care Follow Up Visit    Patient: Patito Domingo  MRN: [id number]  Date of Service: 04/25/2022  Completed by: Marcie Thrasher RN  Referral Date: 02/10/2022  Program:     Reason for Visit   Patient presents with    OPCM Chart Review    Update Plan Of Care    Follow-up       Brief Summary:       Next steps from previous encounter  Did pt start probiotics no  Any new symptoms of uti or yeast infection resolved    Interventions  Chart reviewed  Reviewed upcoming appt schedule    Assess/review short term goals met       Next steps  Case closure if no new needs      Patient Summary     Involvement of Care:  Do I have permission to speak with other family members about your care?       Patient Reported Labs & Vitals:  1.  Any Patient Reported Labs & Vitals?     2.  Patient Reported Blood Pressure:     3.  Patient Reported Pulse:     4.  Patient Reported Weight (Kg):     5.  Patient Reported Blood Glucose (mg/dl):       Medical and social history was reviewed with patient and/or caregiver.     Clinical Assessment     Reviewed and provided basic information on available community resources for mental health, transportation, wellness resources, and palliative care programs with patient and/or caregiver.     Complex Care Plan     Care plan was discussed and completed today with input from patient and/or caregiver.    Patient Instructions     Instructions were provided via the [facility] patient resources and are available for the patient to view on the patient portal.      Todays OPCM Self-Management Care Plan was developed with the patients/caregivers input and was based on identified barriers from todays assessment.  Goals were written today with the patient/caregiver and the patient has agreed to work towards these goals to improve his/her overall well-being. Patient verbalized understanding of the care plan, goals, and all of today's instructions. Encouraged patient/caregiver to  communicate with his/her physician and health care team about health conditions and the treatment plan.  Provided my contact information today and encouraged patient/caregiver to call me with any questions as needed.

## 2022-04-30 ENCOUNTER — EXTERNAL CHRONIC CARE MANAGEMENT (OUTPATIENT)
Dept: PRIMARY CARE CLINIC | Facility: CLINIC | Age: 72
End: 2022-04-30
Payer: MEDICARE

## 2022-04-30 ENCOUNTER — HOSPITAL ENCOUNTER (OUTPATIENT)
Facility: HOSPITAL | Age: 72
Discharge: HOME | End: 2022-05-03
Attending: EMERGENCY MEDICINE | Admitting: SURGERY
Payer: MEDICARE

## 2022-04-30 DIAGNOSIS — C7A.095 MALIGNANT CARCINOID TUMOR OF MIDGUT: ICD-10-CM

## 2022-04-30 DIAGNOSIS — R52 PAIN: ICD-10-CM

## 2022-04-30 DIAGNOSIS — R10.9 CHRONIC ABDOMINAL PAIN: ICD-10-CM

## 2022-04-30 DIAGNOSIS — K83.8 PNEUMOBILIA: Primary | ICD-10-CM

## 2022-04-30 DIAGNOSIS — G89.4 CHRONIC PAIN SYNDROME: ICD-10-CM

## 2022-04-30 DIAGNOSIS — C7B.00 METASTATIC CARCINOID TUMOR: Chronic | ICD-10-CM

## 2022-04-30 DIAGNOSIS — D63.8 ANEMIA OF CHRONIC DISEASE: Chronic | ICD-10-CM

## 2022-04-30 DIAGNOSIS — N20.0 NEPHROLITHIASIS: ICD-10-CM

## 2022-04-30 DIAGNOSIS — R10.11 RIGHT UPPER QUADRANT ABDOMINAL PAIN: ICD-10-CM

## 2022-04-30 DIAGNOSIS — Z86.19 HISTORY OF ESBL E. COLI INFECTION: ICD-10-CM

## 2022-04-30 DIAGNOSIS — Z16.24 MULTIPLE DRUG RESISTANT ORGANISM (MDRO) CULTURE POSITIVE: ICD-10-CM

## 2022-04-30 DIAGNOSIS — M79.89 ARM SWELLING: ICD-10-CM

## 2022-04-30 DIAGNOSIS — G89.29 CHRONIC ABDOMINAL PAIN: ICD-10-CM

## 2022-04-30 DIAGNOSIS — C7A.8 NEUROENDOCRINE CARCINOMA, UNKNOWN PRIMARY SITE: ICD-10-CM

## 2022-04-30 PROBLEM — M19.90 ARTHRITIS: Status: ACTIVE | Noted: 2017-12-29

## 2022-04-30 PROBLEM — M25.519 SHOULDER PAIN: Status: ACTIVE | Noted: 2019-11-18

## 2022-04-30 PROBLEM — E87.6 HYPOKALEMIA: Status: RESOLVED | Noted: 2020-01-14 | Resolved: 2022-04-30

## 2022-04-30 PROBLEM — R55 SYNCOPE: Status: ACTIVE | Noted: 2022-04-03

## 2022-04-30 PROBLEM — R42 VERTIGO: Status: ACTIVE | Noted: 2022-04-04

## 2022-04-30 PROBLEM — R11.2 NAUSEA VOMITING AND DIARRHEA: Status: RESOLVED | Noted: 2020-01-14 | Resolved: 2022-04-30

## 2022-04-30 PROBLEM — K80.50 CHOLEDOCHOLITHIASIS: Status: RESOLVED | Noted: 2022-03-01 | Resolved: 2022-04-30

## 2022-04-30 PROBLEM — H52.00 HYPEROPIA: Status: ACTIVE | Noted: 2022-04-03

## 2022-04-30 PROBLEM — R19.7 NAUSEA VOMITING AND DIARRHEA: Status: RESOLVED | Noted: 2020-01-14 | Resolved: 2022-04-30

## 2022-04-30 PROBLEM — T81.718A POST-EMBOLIZATION SYNDROME FOLLOWING CHEMOEMBOLIZATION OF LIVER: Status: ACTIVE | Noted: 2022-04-04

## 2022-04-30 PROBLEM — C7A.012 MALIGNANT CARCINOID TUMOR OF ILEUM: Status: ACTIVE | Noted: 2022-04-04

## 2022-04-30 PROBLEM — M62.89 FASCIAL HERNIA: Status: ACTIVE | Noted: 2022-04-03

## 2022-04-30 PROBLEM — S02.5XXA CLOSED FRACTURE OF TOOTH: Status: ACTIVE | Noted: 2022-04-04

## 2022-04-30 PROBLEM — N12 PYELONEPHRITIS: Status: ACTIVE | Noted: 2022-04-04

## 2022-04-30 PROBLEM — Z91.81 HISTORY OF FALLING: Status: ACTIVE | Noted: 2022-04-05

## 2022-04-30 LAB
ALBUMIN SERPL BCP-MCNC: 3.4 G/DL (ref 3.5–5.2)
ALP SERPL-CCNC: 105 U/L (ref 55–135)
ALT SERPL W/O P-5'-P-CCNC: 10 U/L (ref 10–44)
ANION GAP SERPL CALC-SCNC: 12 MMOL/L (ref 8–16)
AST SERPL-CCNC: 15 U/L (ref 10–40)
BACTERIA #/AREA URNS HPF: ABNORMAL /HPF
BASOPHILS # BLD AUTO: 0.03 K/UL (ref 0–0.2)
BASOPHILS NFR BLD: 0.6 % (ref 0–1.9)
BILIRUB SERPL-MCNC: 0.9 MG/DL (ref 0.1–1)
BILIRUB UR QL STRIP: NEGATIVE
BNP SERPL-MCNC: 42 PG/ML (ref 0–99)
BUN SERPL-MCNC: 17 MG/DL (ref 8–23)
CALCIUM SERPL-MCNC: 9.8 MG/DL (ref 8.7–10.5)
CHLORIDE SERPL-SCNC: 109 MMOL/L (ref 95–110)
CLARITY UR: CLEAR
CO2 SERPL-SCNC: 20 MMOL/L (ref 23–29)
COLOR UR: YELLOW
CREAT SERPL-MCNC: 1 MG/DL (ref 0.5–1.4)
DIFFERENTIAL METHOD: ABNORMAL
EOSINOPHIL # BLD AUTO: 0.1 K/UL (ref 0–0.5)
EOSINOPHIL NFR BLD: 1.8 % (ref 0–8)
ERYTHROCYTE [DISTWIDTH] IN BLOOD BY AUTOMATED COUNT: 14.2 % (ref 11.5–14.5)
EST. GFR  (AFRICAN AMERICAN): >60 ML/MIN/1.73 M^2
EST. GFR  (NON AFRICAN AMERICAN): 58 ML/MIN/1.73 M^2
GLUCOSE SERPL-MCNC: 94 MG/DL (ref 70–110)
GLUCOSE UR QL STRIP: NEGATIVE
HCT VFR BLD AUTO: 30.5 % (ref 37–48.5)
HGB BLD-MCNC: 9.5 G/DL (ref 12–16)
HGB UR QL STRIP: NEGATIVE
HYALINE CASTS #/AREA URNS LPF: 1 /LPF
IMM GRANULOCYTES # BLD AUTO: 0.01 K/UL (ref 0–0.04)
IMM GRANULOCYTES NFR BLD AUTO: 0.2 % (ref 0–0.5)
INR PPP: 1.1 (ref 0.8–1.2)
KETONES UR QL STRIP: NEGATIVE
LEUKOCYTE ESTERASE UR QL STRIP: ABNORMAL
LIPASE SERPL-CCNC: 15 U/L (ref 4–60)
LYMPHOCYTES # BLD AUTO: 1.3 K/UL (ref 1–4.8)
LYMPHOCYTES NFR BLD: 25.4 % (ref 18–48)
MAGNESIUM SERPL-MCNC: 1.9 MG/DL (ref 1.6–2.6)
MCH RBC QN AUTO: 26.5 PG (ref 27–31)
MCHC RBC AUTO-ENTMCNC: 31.1 G/DL (ref 32–36)
MCV RBC AUTO: 85 FL (ref 82–98)
MICROSCOPIC COMMENT: ABNORMAL
MONOCYTES # BLD AUTO: 0.5 K/UL (ref 0.3–1)
MONOCYTES NFR BLD: 9.8 % (ref 4–15)
NEUTROPHILS # BLD AUTO: 3.2 K/UL (ref 1.8–7.7)
NEUTROPHILS NFR BLD: 62.2 % (ref 38–73)
NITRITE UR QL STRIP: POSITIVE
NRBC BLD-RTO: 0 /100 WBC
PH UR STRIP: 6 [PH] (ref 5–8)
PLATELET # BLD AUTO: 223 K/UL (ref 150–450)
PMV BLD AUTO: 11.4 FL (ref 9.2–12.9)
POTASSIUM SERPL-SCNC: 4.1 MMOL/L (ref 3.5–5.1)
PROT SERPL-MCNC: 7.2 G/DL (ref 6–8.4)
PROT UR QL STRIP: NEGATIVE
PROTHROMBIN TIME: 10.9 SEC (ref 9–12.5)
RBC # BLD AUTO: 3.58 M/UL (ref 4–5.4)
RBC #/AREA URNS HPF: 2 /HPF (ref 0–4)
SODIUM SERPL-SCNC: 141 MMOL/L (ref 136–145)
SP GR UR STRIP: 1.01 (ref 1–1.03)
SQUAMOUS #/AREA URNS HPF: 1 /HPF
TROPONIN I SERPL DL<=0.01 NG/ML-MCNC: <0.006 NG/ML (ref 0–0.03)
URN SPEC COLLECT METH UR: ABNORMAL
UROBILINOGEN UR STRIP-ACNC: NEGATIVE EU/DL
WBC # BLD AUTO: 5.08 K/UL (ref 3.9–12.7)
WBC #/AREA URNS HPF: 8 /HPF (ref 0–5)

## 2022-04-30 PROCEDURE — 99285 EMERGENCY DEPT VISIT HI MDM: CPT | Mod: 25

## 2022-04-30 PROCEDURE — 83690 ASSAY OF LIPASE: CPT | Performed by: EMERGENCY MEDICINE

## 2022-04-30 PROCEDURE — 94761 N-INVAS EAR/PLS OXIMETRY MLT: CPT

## 2022-04-30 PROCEDURE — 80053 COMPREHEN METABOLIC PANEL: CPT | Performed by: EMERGENCY MEDICINE

## 2022-04-30 PROCEDURE — G0378 HOSPITAL OBSERVATION PER HR: HCPCS

## 2022-04-30 PROCEDURE — 25000003 PHARM REV CODE 250: Performed by: STUDENT IN AN ORGANIZED HEALTH CARE EDUCATION/TRAINING PROGRAM

## 2022-04-30 PROCEDURE — 94760 N-INVAS EAR/PLS OXIMETRY 1: CPT

## 2022-04-30 PROCEDURE — 85025 COMPLETE CBC W/AUTO DIFF WBC: CPT | Performed by: EMERGENCY MEDICINE

## 2022-04-30 PROCEDURE — 25000003 PHARM REV CODE 250: Performed by: EMERGENCY MEDICINE

## 2022-04-30 PROCEDURE — 93005 ELECTROCARDIOGRAM TRACING: CPT

## 2022-04-30 PROCEDURE — 93010 EKG 12-LEAD: ICD-10-PCS | Mod: ,,, | Performed by: INTERNAL MEDICINE

## 2022-04-30 PROCEDURE — 93010 ELECTROCARDIOGRAM REPORT: CPT | Mod: ,,, | Performed by: INTERNAL MEDICINE

## 2022-04-30 PROCEDURE — 85610 PROTHROMBIN TIME: CPT | Performed by: EMERGENCY MEDICINE

## 2022-04-30 PROCEDURE — 83735 ASSAY OF MAGNESIUM: CPT | Performed by: EMERGENCY MEDICINE

## 2022-04-30 PROCEDURE — 99490 PR CHRONIC CARE MGMT, 1ST 20 MIN: ICD-10-PCS | Mod: S$GLB,,, | Performed by: INTERNAL MEDICINE

## 2022-04-30 PROCEDURE — 81000 URINALYSIS NONAUTO W/SCOPE: CPT | Performed by: EMERGENCY MEDICINE

## 2022-04-30 PROCEDURE — 83880 ASSAY OF NATRIURETIC PEPTIDE: CPT | Performed by: EMERGENCY MEDICINE

## 2022-04-30 PROCEDURE — 96361 HYDRATE IV INFUSION ADD-ON: CPT

## 2022-04-30 PROCEDURE — 99490 CHRNC CARE MGMT STAFF 1ST 20: CPT | Mod: S$GLB,,, | Performed by: INTERNAL MEDICINE

## 2022-04-30 PROCEDURE — 84484 ASSAY OF TROPONIN QUANT: CPT | Performed by: EMERGENCY MEDICINE

## 2022-04-30 RX ORDER — HYDROMORPHONE HYDROCHLORIDE 1 MG/ML
0.5 INJECTION, SOLUTION INTRAMUSCULAR; INTRAVENOUS; SUBCUTANEOUS EVERY 4 HOURS PRN
Status: DISCONTINUED | OUTPATIENT
Start: 2022-04-30 | End: 2022-05-03 | Stop reason: HOSPADM

## 2022-04-30 RX ORDER — METOPROLOL TARTRATE 25 MG/1
25 TABLET, FILM COATED ORAL 2 TIMES DAILY
Status: DISCONTINUED | OUTPATIENT
Start: 2022-04-30 | End: 2022-05-03 | Stop reason: HOSPADM

## 2022-04-30 RX ORDER — ATORVASTATIN CALCIUM 40 MG/1
40 TABLET, FILM COATED ORAL NIGHTLY
Status: DISCONTINUED | OUTPATIENT
Start: 2022-04-30 | End: 2022-05-03 | Stop reason: HOSPADM

## 2022-04-30 RX ORDER — AMLODIPINE BESYLATE 5 MG/1
5 TABLET ORAL DAILY
Status: DISCONTINUED | OUTPATIENT
Start: 2022-05-01 | End: 2022-05-03 | Stop reason: HOSPADM

## 2022-04-30 RX ORDER — OXYCODONE HYDROCHLORIDE 5 MG/1
15 TABLET ORAL EVERY 4 HOURS PRN
Status: COMPLETED | OUTPATIENT
Start: 2022-04-30 | End: 2022-05-01

## 2022-04-30 RX ORDER — ACETAMINOPHEN 325 MG/1
650 TABLET ORAL EVERY 8 HOURS PRN
Status: DISCONTINUED | OUTPATIENT
Start: 2022-04-30 | End: 2022-05-03 | Stop reason: HOSPADM

## 2022-04-30 RX ORDER — SODIUM CHLORIDE 0.9 % (FLUSH) 0.9 %
10 SYRINGE (ML) INJECTION
Status: DISCONTINUED | OUTPATIENT
Start: 2022-04-30 | End: 2022-05-03 | Stop reason: HOSPADM

## 2022-04-30 RX ORDER — TALC
6 POWDER (GRAM) TOPICAL NIGHTLY PRN
Status: DISCONTINUED | OUTPATIENT
Start: 2022-04-30 | End: 2022-05-03 | Stop reason: HOSPADM

## 2022-04-30 RX ORDER — OXYCODONE HYDROCHLORIDE 5 MG/1
10 TABLET ORAL EVERY 4 HOURS PRN
Status: DISCONTINUED | OUTPATIENT
Start: 2022-04-30 | End: 2022-05-03 | Stop reason: HOSPADM

## 2022-04-30 RX ORDER — DIPHENOXYLATE HYDROCHLORIDE AND ATROPINE SULFATE 2.5; .025 MG/1; MG/1
1 TABLET ORAL 4 TIMES DAILY PRN
Status: DISCONTINUED | OUTPATIENT
Start: 2022-04-30 | End: 2022-05-03 | Stop reason: HOSPADM

## 2022-04-30 RX ORDER — OXYCODONE HYDROCHLORIDE 5 MG/1
5 TABLET ORAL EVERY 4 HOURS PRN
Status: DISCONTINUED | OUTPATIENT
Start: 2022-04-30 | End: 2022-04-30

## 2022-04-30 RX ORDER — HYDRALAZINE HYDROCHLORIDE 20 MG/ML
10 INJECTION INTRAMUSCULAR; INTRAVENOUS EVERY 6 HOURS PRN
Status: DISCONTINUED | OUTPATIENT
Start: 2022-04-30 | End: 2022-05-03 | Stop reason: HOSPADM

## 2022-04-30 RX ORDER — HYDROMORPHONE HYDROCHLORIDE 1 MG/ML
0.2 INJECTION, SOLUTION INTRAMUSCULAR; INTRAVENOUS; SUBCUTANEOUS EVERY 4 HOURS PRN
Status: DISCONTINUED | OUTPATIENT
Start: 2022-04-30 | End: 2022-04-30

## 2022-04-30 RX ORDER — DEXTROSE MONOHYDRATE, SODIUM CHLORIDE, AND POTASSIUM CHLORIDE 50; 1.49; 9 G/1000ML; G/1000ML; G/1000ML
INJECTION, SOLUTION INTRAVENOUS CONTINUOUS
Status: DISCONTINUED | OUTPATIENT
Start: 2022-04-30 | End: 2022-05-03 | Stop reason: HOSPADM

## 2022-04-30 RX ORDER — LOSARTAN POTASSIUM 50 MG/1
100 TABLET ORAL DAILY
Status: DISCONTINUED | OUTPATIENT
Start: 2022-05-01 | End: 2022-05-03 | Stop reason: HOSPADM

## 2022-04-30 RX ADMIN — OXYCODONE 15 MG: 5 TABLET ORAL at 04:04

## 2022-04-30 RX ADMIN — METOPROLOL TARTRATE 25 MG: 25 TABLET, FILM COATED ORAL at 10:04

## 2022-04-30 RX ADMIN — POTASSIUM CHLORIDE, DEXTROSE MONOHYDRATE AND SODIUM CHLORIDE: 150; 5; 900 INJECTION, SOLUTION INTRAVENOUS at 10:04

## 2022-04-30 RX ADMIN — OXYCODONE 15 MG: 5 TABLET ORAL at 08:04

## 2022-04-30 RX ADMIN — Medication 6 MG: at 11:04

## 2022-04-30 NOTE — ED PROVIDER NOTES
Encounter Date: 4/30/2022    SCRIBE #1 NOTE: I, Cecily Asher, am scribing for, and in the presence of, Balwinder Townsend MD.       History     Chief Complaint   Patient presents with    Abdominal Pain     Pt presents to the ER via EMS for abdominal pain. Pt reports Hx of abdominal tumors, pain has gotten worse today, pt reports generalized edema. 20G (R) FARylan Domingo is a 71 y.o. 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    The patient presents to the ED due to right-sided abdominal pain and right upper arm/shoulder pain. Patient reports her abdominal pain has been present since she had ERCP surgery in March 2022 (removal of stone) and has been progressively worsening. She reports her pain is exacerbated when she walks or when she leans over. She has been taking Oxycodone with some relief of abdominal pain. Since then, she has also been experiencing decreased sleep secondary to pain, inability to feel full after eating, and occasional elevated blood pressure. Patient notes compliance with her HTN medications (Losartan, Metoprolol, Amlodipine). Patient also reports right upper arm and right shoulder pain, as well as mild arm swelling, which she believes is from a PICC line she had in place that was recently removed. Patient also recently finished 14 days of IV antibiotics which she received for UTI. Patient additionally reports intermittent diarrhea since yesterday evening. She reports 4 episodes of diarrhea today. She denies any nausea. Patient is allergic to Contrast media, Epinephrine, Ibuprofen, Morphine, and Sulfur.     The history is provided by the patient. No  was used.     Review of patient's allergies  indicates:   Allergen Reactions    Contrast media Hives, Itching and Swelling    Epinephrine Anaphylaxis     Can cause  a Carcinoid Crisis    Ibuprofen Hives, Itching and Swelling    Zofran [ondansetron hcl] Itching     And multiple other reactions    Iodinated contrast media     Morphine Other (See Comments)    Sulfa (sulfonamide antibiotics) Hives, Itching and Swelling     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)      Past Surgical History:   Procedure Laterality Date    ABDOMINAL SURGERY      CATARACT EXTRACTION Left 10/2017     SECTION      CHOLECYSTECTOMY      COLON SURGERY      cystoscope      CYSTOSCOPY W/ RETROGRADES Right 10/10/2019    Procedure: CYSTOSCOPY, WITH RETROGRADE PYELOGRAM;  Surgeon: Gen Isbell MD;  Location: [facility];  Service: Urology;  Laterality: Right;    ERCP N/A 2021    Procedure: ERCP (ENDOSCOPIC RETROGRADE CHOLANGIOPANCREATOGRAPHY);  Surgeon: Jayjay Rivera MD;  Location: [facility] ([address] [place] [address]);  Service: Endoscopy;  Laterality: N/A;    ERCP N/A 3/4/2022    Procedure: ERCP (ENDOSCOPIC RETROGRADE CHOLANGIOPANCREATOGRAPHY);  Surgeon: Roby Virgen MD;  Location: [facility] ([address] [place] [address]);  Service: Endoscopy;  Laterality: N/A;    EYE SURGERY      HYSTERECTOMY  1996    LITHOTRIPSY      LIVER BIOPSY      carcinoid    URETEROSCOPY Right 10/10/2019    Procedure: URETEROSCOPY;  Surgeon: Gen Isbell MD;  Location: [facility] OR;  Service: Urology;  Laterality: Right;    UTERINE FIBROID SURGERY       Family History   Problem Relation Age of Onset    Cancer Mother         unknown    Alzheimer's disease Father     Stroke  Sister     No Known Problems Son     No Known Problems Son     No Known Problems Son     No Known Problems Son     Kidney disease Neg Hx      Social History     Tobacco Use    Smoking status: Never Smoker    Smokeless tobacco: Never Used   Substance Use Topics    Alcohol use: No     Alcohol/week: 0.0 standard drinks    Drug use: No     Review of Systems  Constitutional-no fever, positive activity change  HEENT-no congestion  Eyes-no redness  Respiratory-no shortness of breath  Cardio-no chest pain  GI-no nausea, positive abdominal pain, positive diarrhea  Endocrine-no cold intolerance  -no difficulty urinating  MSK-positive arthralgias  Skin-no rashes  Allergy-no environmental allergy  Neurologic-, no headache  Hematology-no swollen nodes  Behavioral-no confusion    Physical Exam     Initial Vitals [04/30/22 1552]   BP Pulse Resp Temp SpO2   (!) 199/76 (!) 55 18 98 °F (36.7 °C) 97 %      MAP       --         Physical Exam  Constitutional:  Mildly uncomfortable appearing 71-year-old female  Eyes: Conjunctivae normal.  ENT       Head: Normocephalic, atraumatic.       Nose: No congestion.       Mouth/Throat: Mucous membranes are moist.  Hematological/Lymphatic/Immunilogical: No cervical lymphadenopathy.  Cardiovascular: Normal rate, regular rhythm. Normal and symmetric distal pulses.  Respiratory: Normal respiratory effort. Breath sounds are normal.  Gastrointestinal:  Diffusely tender most prominent in the right upper quadrant, no rebound, no guarding  Musculoskeletal: Normal range of motion in all extremities. No obvious deformities or swelling.  Neurologic: Alert, oriented. Normal speech and language. No gross focal neurologic deficits are appreciated.  Skin: Skin is warm, dry. No rash noted.  Psychiatric: Mood and affect are normal.     ED Course   Procedures  Labs Reviewed   CBC W/ AUTO DIFFERENTIAL - Abnormal; Notable for the following components:       Result Value    RBC 3.58 (*)     Hemoglobin 9.5  (*)     Hematocrit 30.5 (*)     MCH 26.5 (*)     MCHC 31.1 (*)     All other components within normal limits   COMPREHENSIVE METABOLIC PANEL - Abnormal; Notable for the following components:    CO2 20 (*)     Albumin 3.4 (*)     eGFR if non  58 (*)     All other components within normal limits   URINALYSIS, REFLEX TO URINE CULTURE - Abnormal; Notable for the following components:    Nitrite, UA Positive (*)     Leukocytes, UA Trace (*)     All other components within normal limits    Narrative:     Specimen Source->Urine   URINALYSIS MICROSCOPIC - Abnormal; Notable for the following components:    WBC, UA 8 (*)     All other components within normal limits    Narrative:     Specimen Source->Urine   CLOSTRIDIUM DIFFICILE   LIPASE   MAGNESIUM   TROPONIN I   B-TYPE NATRIURETIC PEPTIDE   PROTIME-INR   SARS-COV-2 RDRP GENE        ECG Results          EKG 12-lead (Preliminary result)  Result time 04/30/22 16:34:50    ED Interpretation by Balwinder Townsend MD (04/30/22 16:34:50, [facility] Emergency Dept, Emergency Medicine)    My EKG interpretation, sinus rhythm, 66 beats per minute, normal axis, no ST segment changes, Q-wave V2, when compared to previous EKG 11/20/2021 no appreciable changes                            Imaging Results          CT Abdomen Pelvis  Without Contrast (Final result)  Result time 04/30/22 19:30:42    Final result by Brii Carrizales MD (04/30/22 19:30:42)                 Impression:      Interval appearance of pneumobilia in the liver and extrahepatic biliary ducts.  No significant caliber increase in the biliary system this patient post ERCP and stated stone removal 03/04/2022.  This finding could be related to sphincterotomy however it was not present on the recent exam 03/27/2022 which was closer to patient's ERCP.  In the appropriate clinical setting would consider the possibility of cholangitis.    No convincing significant acute mesenteric inflammation, bowel  obstruction, extraluminal free air in the abdomen or other significant finding.  Mild gaseous prominence of the colon more so than prior exam is noted.    Additional stable findings of nephrolithiasis.  No obstruction.    Postoperative changes of bowel anastomosis.    Metastatic lesions involving the liver and mesentery in this patient with carcinoid unchanged since prior.      Electronically signed by: Brii Carrizales  Date:    04/30/2022  Time:    19:30             Narrative:    EXAMINATION:  CT ABDOMEN PELVIS WITHOUT CONTRAST    CLINICAL HISTORY:  Abdominal pain, acute, nonlocalized;    TECHNIQUE:  Low dose axial images, sagittal and coronal reformations were obtained from the lung bases to the pubic symphysis without oral or IV contrast..    COMPARISON:  CT abdomen pelvis 03/27/2022, ultrasound abdomen 03/27/2022, 03/01/2022, 02/02/2022 CT abdomen pelvis    FINDINGS:  Abdomen:    - Lower thorax/lungs:There are atelectatic changes in the lung bases similar prior CT and the visualized heart is not significantly enlarged.  There is no effusion.  Small 1 cm in diameter cystic focus suggestive of pericardial cyst again noted posterior to the inferior right pulmonary vein.    Lack of intravenous contrast limits assessment for focal visceral lesions.    - Liver: Since the most recent CT 03/27/2022 there is interval development of biliary pneumatosis in the non dependent left lobe and along the common hepatic and common bile ducts.  ERCP was performed recently for stone removal and findings could be related to sphincterotomy and the development of bowel entering up the biliary tract.  This finding however was not present on the recent exam 03/27/2022 which was closer to patient's ERCP procedure (03/04/2022) and is of uncertain significance as an interval finding.  In the appropriate clinical setting would consider the possibility of cholangitis.  There is no convincing increased distension of the intra and extrahepatic  biliary ducts since prior recent exams with common hepatic duct measuring around 9 mm in diameter image 52 series 2.    There is no extraluminal free air is seen in the peritoneum.  There is no convincing evidence of portal gas or acute process involving the bowel to suggest acute gas in the portal vein.  Multiple calcified lesions again noted throughout the liver in this patient history of carcinoid stable since prior with dominant focus in the left lobe measuring 13 mm but better visualized on MRI.    - Gallbladder: Patient is post cholecystectomy.    - Spleen: Negative.    - Kidneys: There is no acute hydronephrosis or ureterectasis.  Extrarenal pelvis noted on the right appearing stable.  Multiple calculi including large staying horn calculus in the right lower pole of the kidney appear stable measuring up to 2.5 cm in sagittal dimension in the lower pole calyx maximally.  Left kidney also contains dominant calculus in lower pole measuring 11 mm.  Cortical thinning noted without cortical lesions particularly on the right.    - Adrenals: Unremarkable.    - Pancreas: No mass or peripancreatic fat stranding.    - Retroperitoneum:  No significant adenopathy.    - Vascular: No abdominal aortic aneurysm.    - Abdominal wall:  Unremarkable.    Pelvis:    Bladder is unremarkable as imaged.  There is no acute pelvic mass, adenopathy, or free fluid.  Patient is post hysterectomy.    Bowel/Mesentery: Mesenteric partially calcified irregular lobular metastatic lesion is noted measuring 5 cm in maximal length abutting loops of duodenum near the pancreatic head to the right of midline along the mid abdomen.  Postoperative changes of bowel anastomosis along the transverse colon noted.  There is no evidence of acute bowel obstruction or significant focal inflammation.  The colon demonstrates mild gaseous prominence but no demetrius distension or evidence of impaction/significant constipation.  Small bowel is of normal  caliber.    Bones:  No acute osseous abnormality is seen.  Degenerative changes noted involving the lumbosacral junction, SI joints and hips bilaterally..                               US Upper Extremity Veins Right (Final result)  Result time 04/30/22 17:44:39    Final result by Massimo Walker MD (04/30/22 17:44:39)                 Impression:      No thrombus in central veins of the right upper extremity.    Superficial thrombophlebitis of the right cephalic vein.      Electronically signed by: Massimo Walker MD  Date:    04/30/2022  Time:    17:44             Narrative:    EXAMINATION:  US UPPER EXTREMITY VEINS RIGHT    CLINICAL HISTORY:  pain;    TECHNIQUE:  Duplex and color flow Doppler evaluation and dynamic compression was performed of the right upper extremity veins.    COMPARISON:  None    FINDINGS:  Central veins: The internal jugular, subclavian, and axillary veins are patent and free of thrombus.    Arm veins: The brachial and basilic veins are patent and compressible.  There is a nonocclusive thrombus within the cephalic vein.    Contralateral subclavian/internal jugular veins: The left subclavian and internal jugular veins are patent and free of thrombus.    Other findings: None.                                 Medications   oxyCODONE immediate release tablet 15 mg (15 mg Oral Given 4/30/22 2051)   amLODIPine tablet 5 mg (has no administration in time range)   atorvastatin tablet 40 mg (40 mg Oral Not Given 4/30/22 2219)   diphenoxylate-atropine 2.5-0.025 mg per tablet 1 tablet (has no administration in time range)   losartan tablet 100 mg (has no administration in time range)   metoprolol tartrate (LOPRESSOR) tablet 25 mg (25 mg Oral Given 4/30/22 2219)   sodium chloride 0.9% flush 10 mL (has no administration in time range)   melatonin tablet 6 mg (6 mg Oral Given 4/30/22 2350)   dextrose 5 % and 0.9 % NaCl with KCl 20 mEq infusion ( Intravenous New Bag 4/30/22 2224)   acetaminophen tablet 650 mg (has no  administration in time range)   hydrALAZINE injection 10 mg (has no administration in time range)   iron-vit c-b12-folic acid (I-Car-C Plus) 186-772-36-1 mg-mg-mcg-mg tablet 1 tablet (has no administration in time range)   HYDROmorphone injection 0.5 mg (has no administration in time range)   oxyCODONE immediate release tablet 10 mg (has no administration in time range)     Medical Decision Making:   History:   Old Medical Records: I decided to obtain old medical records.  Old Records Summarized: records from clinic visits and records from previous admission(s).  Differential Diagnosis:   Pancreatitis, hepatitis, intractable pain, cholangitis  Clinical Tests:   Lab Tests: Ordered and Reviewed  Radiological Study: Ordered and Reviewed  Medical Tests: Ordered and Reviewed          Scribe Attestation:   Scribe #1: I performed the above scribed service and the documentation accurately describes the services I performed. I attest to the accuracy of the note.       Scribe attestation - A scribe helped in creating this medical record however all statements reflect to the best as reasonably possible the opinion and impression of Dr. Balwinder Townsend attending physician  ED Course as of 05/01/22 0135   Sat Apr 30, 2022 2004 Discussed case with on-call resident Dr. Aguilar on-call resident from [facility] who plans for call back for further evaluation potential pain control. [TK]      ED Course User Index  [TK] Balwinder Townsend MD             Clinical Impression:   Final diagnoses:  [R52] Pain  [R10.11] Right upper quadrant abdominal pain  [M79.89] Arm swelling          ED Disposition Condition    Observation               Balwinder Townsend MD  05/01/22 0135

## 2022-05-01 LAB
ALBUMIN SERPL BCP-MCNC: 3.1 G/DL (ref 3.5–5.2)
ALP SERPL-CCNC: 103 U/L (ref 55–135)
ALT SERPL W/O P-5'-P-CCNC: 6 U/L (ref 10–44)
ANION GAP SERPL CALC-SCNC: 10 MMOL/L (ref 8–16)
ANISOCYTOSIS BLD QL SMEAR: SLIGHT
AST SERPL-CCNC: 12 U/L (ref 10–40)
BASOPHILS # BLD AUTO: 0.04 K/UL (ref 0–0.2)
BASOPHILS NFR BLD: 1 % (ref 0–1.9)
BILIRUB SERPL-MCNC: 0.9 MG/DL (ref 0.1–1)
BUN SERPL-MCNC: 14 MG/DL (ref 8–23)
CALCIUM SERPL-MCNC: 9.5 MG/DL (ref 8.7–10.5)
CHLORIDE SERPL-SCNC: 109 MMOL/L (ref 95–110)
CO2 SERPL-SCNC: 21 MMOL/L (ref 23–29)
CREAT SERPL-MCNC: 0.9 MG/DL (ref 0.5–1.4)
DIFFERENTIAL METHOD: ABNORMAL
EOSINOPHIL # BLD AUTO: 0.1 K/UL (ref 0–0.5)
EOSINOPHIL NFR BLD: 2.5 % (ref 0–8)
ERYTHROCYTE [DISTWIDTH] IN BLOOD BY AUTOMATED COUNT: 14.3 % (ref 11.5–14.5)
EST. GFR  (AFRICAN AMERICAN): >60 ML/MIN/1.73 M^2
EST. GFR  (NON AFRICAN AMERICAN): >60 ML/MIN/1.73 M^2
GIANT PLATELETS BLD QL SMEAR: PRESENT
GLUCOSE SERPL-MCNC: 102 MG/DL (ref 70–110)
HCT VFR BLD AUTO: 32 % (ref 37–48.5)
HGB BLD-MCNC: 9.9 G/DL (ref 12–16)
IMM GRANULOCYTES # BLD AUTO: 0.01 K/UL (ref 0–0.04)
IMM GRANULOCYTES NFR BLD AUTO: 0.2 % (ref 0–0.5)
LYMPHOCYTES # BLD AUTO: 1.3 K/UL (ref 1–4.8)
LYMPHOCYTES NFR BLD: 31.2 % (ref 18–48)
MCH RBC QN AUTO: 26 PG (ref 27–31)
MCHC RBC AUTO-ENTMCNC: 30.9 G/DL (ref 32–36)
MCV RBC AUTO: 84 FL (ref 82–98)
MONOCYTES # BLD AUTO: 0.5 K/UL (ref 0.3–1)
MONOCYTES NFR BLD: 12.5 % (ref 4–15)
NEUTROPHILS # BLD AUTO: 2.1 K/UL (ref 1.8–7.7)
NEUTROPHILS NFR BLD: 52.6 % (ref 38–73)
NRBC BLD-RTO: 0 /100 WBC
PLATELET # BLD AUTO: 191 K/UL (ref 150–450)
PMV BLD AUTO: 11.5 FL (ref 9.2–12.9)
POIKILOCYTOSIS BLD QL SMEAR: SLIGHT
POTASSIUM SERPL-SCNC: 4.2 MMOL/L (ref 3.5–5.1)
PROT SERPL-MCNC: 6.9 G/DL (ref 6–8.4)
RBC # BLD AUTO: 3.81 M/UL (ref 4–5.4)
SODIUM SERPL-SCNC: 140 MMOL/L (ref 136–145)
WBC # BLD AUTO: 4.01 K/UL (ref 3.9–12.7)

## 2022-05-01 PROCEDURE — 25000003 PHARM REV CODE 250: Performed by: EMERGENCY MEDICINE

## 2022-05-01 PROCEDURE — 94761 N-INVAS EAR/PLS OXIMETRY MLT: CPT

## 2022-05-01 PROCEDURE — G0378 HOSPITAL OBSERVATION PER HR: HCPCS

## 2022-05-01 PROCEDURE — 87086 URINE CULTURE/COLONY COUNT: CPT | Performed by: STUDENT IN AN ORGANIZED HEALTH CARE EDUCATION/TRAINING PROGRAM

## 2022-05-01 PROCEDURE — 25000003 PHARM REV CODE 250: Performed by: SURGERY

## 2022-05-01 PROCEDURE — 85025 COMPLETE CBC W/AUTO DIFF WBC: CPT | Performed by: SURGERY

## 2022-05-01 PROCEDURE — 36415 COLL VENOUS BLD VENIPUNCTURE: CPT | Performed by: SURGERY

## 2022-05-01 PROCEDURE — 96361 HYDRATE IV INFUSION ADD-ON: CPT

## 2022-05-01 PROCEDURE — 87449 NOS EACH ORGANISM AG IA: CPT | Performed by: EMERGENCY MEDICINE

## 2022-05-01 PROCEDURE — 96366 THER/PROPH/DIAG IV INF ADDON: CPT

## 2022-05-01 PROCEDURE — 25000003 PHARM REV CODE 250: Performed by: STUDENT IN AN ORGANIZED HEALTH CARE EDUCATION/TRAINING PROGRAM

## 2022-05-01 PROCEDURE — 80053 COMPREHEN METABOLIC PANEL: CPT | Performed by: SURGERY

## 2022-05-01 PROCEDURE — 96372 THER/PROPH/DIAG INJ SC/IM: CPT | Performed by: SURGERY

## 2022-05-01 PROCEDURE — 96365 THER/PROPH/DIAG IV INF INIT: CPT

## 2022-05-01 PROCEDURE — 63600175 PHARM REV CODE 636 W HCPCS: Performed by: SURGERY

## 2022-05-01 RX ORDER — ENOXAPARIN SODIUM 100 MG/ML
30 INJECTION SUBCUTANEOUS EVERY 24 HOURS
Status: DISCONTINUED | OUTPATIENT
Start: 2022-05-01 | End: 2022-05-02

## 2022-05-01 RX ADMIN — Medication 1 TABLET: at 05:05

## 2022-05-01 RX ADMIN — POTASSIUM CHLORIDE, DEXTROSE MONOHYDRATE AND SODIUM CHLORIDE: 150; 5; 900 INJECTION, SOLUTION INTRAVENOUS at 07:05

## 2022-05-01 RX ADMIN — LOSARTAN POTASSIUM 100 MG: 50 TABLET, FILM COATED ORAL at 08:05

## 2022-05-01 RX ADMIN — DIPHENOXYLATE HYDROCHLORIDE AND ATROPINE SULFATE 1 TABLET: 2.5; .025 TABLET ORAL at 11:05

## 2022-05-01 RX ADMIN — OXYCODONE 10 MG: 5 TABLET ORAL at 04:05

## 2022-05-01 RX ADMIN — OXYCODONE 10 MG: 5 TABLET ORAL at 01:05

## 2022-05-01 RX ADMIN — AMLODIPINE BESYLATE 5 MG: 5 TABLET ORAL at 08:05

## 2022-05-01 RX ADMIN — CEFOXITIN 12 G: 2 INJECTION, POWDER, FOR SOLUTION INTRAVENOUS at 03:05

## 2022-05-01 RX ADMIN — OXYCODONE 15 MG: 5 TABLET ORAL at 08:05

## 2022-05-01 RX ADMIN — ATORVASTATIN CALCIUM 40 MG: 40 TABLET, FILM COATED ORAL at 08:05

## 2022-05-01 RX ADMIN — OXYCODONE 10 MG: 5 TABLET ORAL at 08:05

## 2022-05-01 RX ADMIN — ENOXAPARIN SODIUM 30 MG: 30 INJECTION SUBCUTANEOUS at 05:05

## 2022-05-01 NOTE — PLAN OF CARE
Patient alert and awake. On special contact to r/o CDIFF. Stool sample sent to lab. Pain medicine and diarrhea medicine given as needed. Call light within reach. Bed alarm on.

## 2022-05-01 NOTE — PLAN OF CARE
"  Problem: Adult Inpatient Plan of Care  Goal: Plan of Care Review  Outcome: Ongoing, Progressing     VIRTUAL NURSE:  Labs, notes, orders, and careplan reviewed.       04/30/22 0894   Patient Request   Patient Requested worried about going to bathroom d/t IVF; offered purewick. refused SCDs; prefer "shot"   Nurse Notification   Bedside Nurse Notified? Yes   Name of Bedside Nurse PANCHITO Boswell   Nurse Notfication Method Secure Chat   Nurse Notified Of Patient Request   Admission   Initial VN Admission Questions Complete   Communication Issues? Technical Issue   Shift   Virtual Nurse - Rounding Complete   Pain Management Interventions pain management plan reviewed with patient/caregiver   Virtual Nurse - Patient Verbalized Approval Of Camera Use;VN Rounding   Type of Frequent Check   Type Patient Rounds   Safety/Activity   Patient Rounds bed in low position;placement of personal items at bedside;bed wheels locked;call light in patient/parent reach;visualized patient;clutter free environment maintained   Safety Promotion/Fall Prevention assistive device/personal item within reach;diversional activities provided;Fall Risk reviewed with patient/family;high risk medications identified;medications reviewed;side rails raised x 3;supervised activity;instructed to call staff for mobility   Safety Precautions emergency equipment at bedside   Positioning   Body Position right;side-lying   Head of Bed (HOB) Positioning HOB at 60 degrees   Pain/Comfort/Sleep   Preferred Pain Scale number (Numeric Rating Pain Scale)   Comfort/Acceptable Pain Level 3   Pain Rating (0-10): Rest 0   POSS (Pasero Opioid-Induced Sed Scale) 1 - Awake and alert   Sleep/Rest/Relaxation no problem identified     "

## 2022-05-01 NOTE — PLAN OF CARE
Pt AAOx4. Medicated for pain with PRN oxycodone. Medications administered per MAR. IVFs infusing. On RA. Refusing SCDs at this time. Up to BSC overnight. Special contact precautions in place for r/o C. Diff. Safety maintained with bed alarm set, side rails raised, and call light in reach.

## 2022-05-01 NOTE — ED NOTES
Assumed care of pt c/o R ABD pain and R shoulder pain x weeks to a month. Pt denies recent trauma. Pt resting in bed and requesting a ride home. Pt denies other complaints. Pt has extenesive PMHx including ABD cancer. Assessment to follow.     Review of patient's allergies indicates:   Allergen Reactions    Contrast media Hives, Itching and Swelling    Epinephrine Anaphylaxis     Can cause  a Carcinoid Crisis    Ibuprofen Hives, Itching and Swelling    Zofran [ondansetron hcl] Itching     And multiple other reactions    Iodinated contrast media     Morphine Other (See Comments)    Sulfa (sulfonamide antibiotics) Hives, Itching and Swelling        Patient has verified the spelling of their name and  on armband.   APPEARANCE: Patient is alert, calm, oriented x 4, and does not appear distressed.  SKIN: Skin is normal for race, warm, and dry. Normal skin turgor and mucous membranes moist.  CARDIAC: Normal rate and rhythm, no murmur heard.   RESPIRATORY:Normal rate and effort. Breath sounds clear bilaterally throughout chest. Respirations are equal and unlabored.    GASTRO: Bowel sounds normal, abdomen is soft, RLQ tenderness, and no abdominal distention.  MUSCLE: Full ROM. No bony tenderness or RUE  tenderness. No obvious deformity.  PERIPHERAL VASCULAR: peripheral pulses present. Normal cap refill. No edema. Warm to touch.  NEURO: 5/5 strength major flexors/extensors bilaterally. Sensory intact to light touch bilaterally. Kylie coma scale: eyes open spontaneously-4, oriented & converses-5, obeys commands-6. No neurological abnormalities.   MENTAL STATUS: awake, alert and aware of environment.  EYE: No overt deficits noted. No drainage. Sclera WNL  ENT: EARS: no obvious drainage. NOSE: no active bleeding. THROAT: no redness or swelling.  : Voids without complication    ED workup and orders in progress. Pt SR up x 2. Bed in lowest position with wheels locked. Call bell within reach of pt.

## 2022-05-01 NOTE — H&P
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 Since that admission she has presented to  The ER several times with abdominal pain and has been treated for complicated UTI. Yesterday she noticed that her blood pressure was very high >200/100s on home reading and attributed this to worsening right sided abdominal pain. Her vitals and labs were unremarkable. However, CT did show a new finding of pneumobilia.   She denies N/V, SOB, chest pain, dysuria, melana. Last BM yesterday was mostly formed.     Allergies:  Review of patient's allergies indicates:   Allergen Reactions    Contrast media Hives, Itching and Swelling    Epinephrine Anaphylaxis     Can cause  a Carcinoid Crisis    Ibuprofen Hives, Itching and Swelling    Zofran [ondansetron hcl] Itching     And multiple other reactions    Iodinated contrast media     Morphine Other (See Comments)    Sulfa (sulfonamide antibiotics) Hives, Itching and Swelling       Home Medications:  No current facility-administered medications on file prior to encounter.     Current Outpatient Medications on File Prior to Encounter   Medication Sig    amLODIPine (NORVASC) 5 MG tablet Take 1 tablet (5 mg total) by mouth once daily.    atorvastatin (LIPITOR) 40 MG tablet Take 1 tablet (40 mg total) by mouth every evening.    cyanocobalamin (VITAMIN B-12) 1000 MCG tablet Take 1 tablet (1,000 mcg total) by mouth once daily.    diclofenac sodium (VOLTAREN) 1 % Gel Apply 2 g topically 4 (four) times daily as needed (area of pain).    diphenoxylate-atropine 2.5-0.025 mg (LOMOTIL) 2.5-0.025 mg per tablet TAKE 1 TABLET BY MOUTH FOUR TIMES DAILY AS NEEDED    losartan (COZAAR) 100 MG tablet TAKE 1 TABLET (100 MG TOTAL) BY MOUTH ONCE DAILY.     magnesium oxide (MAG-OX) 400 mg (241.3 mg magnesium) tablet Take 1 tablet (400 mg total) by mouth 2 (two) times daily.    meclizine (ANTIVERT) 25 mg tablet TAKE 1 TABLET(25 MG) BY MOUTH THREE TIMES DAILY AS NEEDED FOR DIZZINESS (Patient taking differently: Take 25 mg by mouth 3 (three) times daily as needed for Dizziness.)    metoprolol tartrate (LOPRESSOR) 25 MG tablet TAKE 1 TABLET TWICE DAILY    oxyCODONE (ROXICODONE) 15 MG Tab Take 15 mg by mouth every 4 (four) hours as needed (chronic abdominal pain, malignancy related).    alcohol swabs (BD ALCOHOL SWABS) PadM Apply 1 each topically as needed.    BLOOD PRESSURE CUFF Misc 1 each by Misc.(Non-Drug; Combo Route) route once daily.       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)      Past Surgical History:   Procedure Laterality Date    ABDOMINAL SURGERY      CATARACT EXTRACTION Left 10/2017     SECTION      CHOLECYSTECTOMY      COLON SURGERY      cystoscope      CYSTOSCOPY W/ RETROGRADES Right 10/10/2019    Procedure: CYSTOSCOPY, WITH RETROGRADE PYELOGRAM;  Surgeon: Gen Isbell MD;  Location: [facility] OR;  Service: Urology;  Laterality: Right;    ERCP N/A 2021    Procedure: ERCP (ENDOSCOPIC RETROGRADE CHOLANGIOPANCREATOGRAPHY);  Surgeon: Jayjay Rivera MD;  Location: [address]);  Service: Endoscopy;  Laterality: N/A;    ERCP N/A 3/4/2022    Procedure: ERCP (ENDOSCOPIC RETROGRADE CHOLANGIOPANCREATOGRAPHY);  Surgeon: Roby Virgen MD;  Location: [facility] ENDO (2ND FLR);  Service: Endoscopy;  Laterality: N/A;    EYE SURGERY      HYSTERECTOMY  1996    LITHOTRIPSY      LIVER BIOPSY       carcinoid    URETEROSCOPY Right 10/10/2019    Procedure: URETEROSCOPY;  Surgeon: Gen Isbell MD;  Location: [facility] OR;  Service: Urology;  Laterality: Right;    UTERINE FIBROID SURGERY       Family History   Problem Relation Age of Onset    Cancer Mother         unknown    Alzheimer's disease Father     Stroke Sister     No Known Problems Son     No Known Problems Son     No Known Problems Son     No Known Problems Son     Kidney disease Neg Hx      Social History     Tobacco Use    Smoking status: Never Smoker    Smokeless tobacco: Never Used   Substance Use Topics    Alcohol use: No     Alcohol/week: 0.0 standard drinks    Drug use: No        Review of Systems:  As Per HPI    OBJECTIVE:     Vital Signs:  Temp: 98.2 °F (36.8 °C) (05/01/22 0819)  Pulse: (!) 59 (05/01/22 0819)  Resp: 17 (05/01/22 0819)  BP: (!) 192/82 (05/01/22 0819)  SpO2: 99 % (05/01/22 0755)    Physical Exam:  General: well developed, well nourished, no distress  HEENT: normocephalic, atraumatic, hearing grossly normal bilaterally, mucous membranes moist, EOM intact, no scleral icterus  Neck: supple, symmetrical, trachea midline, no JVD  Lungs:  clear to auscultation bilaterally and normal respiratory effort  Cardiovascular: regular rate and rhythm.    Extremities: no cyanosis or edema, or clubbing, distal pulses palpable and symmetric  Abdomen: soft, tender to deep palpation on right side, no distention, no masses/hernias  Skin: Skin color, texture, turgor normal. No rashes or lesions  Musculoskeletal:no clubbing, cyanosis, no deformities  Neurologic: No focal numbness or weakness  Psych/Behavioral:  Alert and oriented, appropriate affect.    Laboratory:  Labs Reviewed   CBC W/ AUTO DIFFERENTIAL - Abnormal; Notable for the following components:       Result Value    RBC 3.58 (*)     Hemoglobin 9.5 (*)     Hematocrit 30.5 (*)     MCH 26.5 (*)     MCHC 31.1 (*)     All other components within normal limits   COMPREHENSIVE METABOLIC  PANEL - Abnormal; Notable for the following components:    CO2 20 (*)     Albumin 3.4 (*)     eGFR if non  58 (*)     All other components within normal limits   URINALYSIS, REFLEX TO URINE CULTURE - Abnormal; Notable for the following components:    Nitrite, UA Positive (*)     Leukocytes, UA Trace (*)     All other components within normal limits    Narrative:     Specimen Source->Urine   URINALYSIS MICROSCOPIC - Abnormal; Notable for the following components:    WBC, UA 8 (*)     All other components within normal limits    Narrative:     Specimen Source->Urine   CLOSTRIDIUM DIFFICILE   LIPASE   MAGNESIUM   TROPONIN I   B-TYPE NATRIURETIC PEPTIDE   PROTIME-INR   SARS-COV-2 RDRP GENE       Diagnostic Results:  Imaging Results          CT Abdomen Pelvis  Without Contrast (Final result)  Result time 04/30/22 19:30:42    Final result by Brii Carrizales MD (04/30/22 19:30:42)                 Impression:      Interval appearance of pneumobilia in the liver and extrahepatic biliary ducts.  No significant caliber increase in the biliary system this patient post ERCP and stated stone removal 03/04/2022.  This finding could be related to sphincterotomy however it was not present on the recent exam 03/27/2022 which was closer to patient's ERCP.  In the appropriate clinical setting would consider the possibility of cholangitis.    No convincing significant acute mesenteric inflammation, bowel obstruction, extraluminal free air in the abdomen or other significant finding.  Mild gaseous prominence of the colon more so than prior exam is noted.    Additional stable findings of nephrolithiasis.  No obstruction.    Postoperative changes of bowel anastomosis.    Metastatic lesions involving the liver and mesentery in this patient with carcinoid unchanged since prior.      Electronically signed by: Brii Carrizales  Date:    04/30/2022  Time:    19:30             Narrative:    EXAMINATION:  CT ABDOMEN PELVIS  WITHOUT CONTRAST    CLINICAL HISTORY:  Abdominal pain, acute, nonlocalized;    TECHNIQUE:  Low dose axial images, sagittal and coronal reformations were obtained from the lung bases to the pubic symphysis without oral or IV contrast..    COMPARISON:  CT abdomen pelvis 03/27/2022, ultrasound abdomen 03/27/2022, 03/01/2022, 02/02/2022 CT abdomen pelvis    FINDINGS:  Abdomen:    - Lower thorax/lungs:There are atelectatic changes in the lung bases similar prior CT and the visualized heart is not significantly enlarged.  There is no effusion.  Small 1 cm in diameter cystic focus suggestive of pericardial cyst again noted posterior to the inferior right pulmonary vein.    Lack of intravenous contrast limits assessment for focal visceral lesions.    - Liver: Since the most recent CT 03/27/2022 there is interval development of biliary pneumatosis in the non dependent left lobe and along the common hepatic and common bile ducts.  ERCP was performed recently for stone removal and findings could be related to sphincterotomy and the development of bowel entering up the biliary tract.  This finding however was not present on the recent exam 03/27/2022 which was closer to patient's ERCP procedure (03/04/2022) and is of uncertain significance as an interval finding.  In the appropriate clinical setting would consider the possibility of cholangitis.  There is no convincing increased distension of the intra and extrahepatic biliary ducts since prior recent exams with common hepatic duct measuring around 9 mm in diameter image 52 series 2.    There is no extraluminal free air is seen in the peritoneum.  There is no convincing evidence of portal gas or acute process involving the bowel to suggest acute gas in the portal vein.  Multiple calcified lesions again noted throughout the liver in this patient history of carcinoid stable since prior with dominant focus in the left lobe measuring 13 mm but better visualized on MRI.    -  Gallbladder: Patient is post cholecystectomy.    - Spleen: Negative.    - Kidneys: There is no acute hydronephrosis or ureterectasis.  Extrarenal pelvis noted on the right appearing stable.  Multiple calculi including large staying horn calculus in the right lower pole of the kidney appear stable measuring up to 2.5 cm in sagittal dimension in the lower pole calyx maximally.  Left kidney also contains dominant calculus in lower pole measuring 11 mm.  Cortical thinning noted without cortical lesions particularly on the right.    - Adrenals: Unremarkable.    - Pancreas: No mass or peripancreatic fat stranding.    - Retroperitoneum:  No significant adenopathy.    - Vascular: No abdominal aortic aneurysm.    - Abdominal wall:  Unremarkable.    Pelvis:    Bladder is unremarkable as imaged.  There is no acute pelvic mass, adenopathy, or free fluid.  Patient is post hysterectomy.    Bowel/Mesentery: Mesenteric partially calcified irregular lobular metastatic lesion is noted measuring 5 cm in maximal length abutting loops of duodenum near the pancreatic head to the right of midline along the mid abdomen.  Postoperative changes of bowel anastomosis along the transverse colon noted.  There is no evidence of acute bowel obstruction or significant focal inflammation.  The colon demonstrates mild gaseous prominence but no demetrius distension or evidence of impaction/significant constipation.  Small bowel is of normal caliber.    Bones:  No acute osseous abnormality is seen.  Degenerative changes noted involving the lumbosacral junction, SI joints and hips bilaterally..                               US Upper Extremity Veins Right (Final result)  Result time 04/30/22 17:44:39    Final result by Massimo Walker MD (04/30/22 17:44:39)                 Impression:      No thrombus in central veins of the right upper extremity.    Superficial thrombophlebitis of the right cephalic vein.      Electronically signed by: Massimo Walker  MD  Date:    04/30/2022  Time:    17:44             Narrative:    EXAMINATION:  US UPPER EXTREMITY VEINS RIGHT    CLINICAL HISTORY:  pain;    TECHNIQUE:  Duplex and color flow Doppler evaluation and dynamic compression was performed of the right upper extremity veins.    COMPARISON:  None    FINDINGS:  Central veins: The internal jugular, subclavian, and axillary veins are patent and free of thrombus.    Arm veins: The brachial and basilic veins are patent and compressible.  There is a nonocclusive thrombus within the cephalic vein.    Contralateral subclavian/internal jugular veins: The left subclavian and internal jugular veins are patent and free of thrombus.    Other findings: None.                                ASSESSMENT:     70 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 Now w/ pneumobilia likely related to sphincterotomy    PLAN:     Multimodal pain medication  Home meds, blood pressure poorly controlled  Regular diet  PRN antiemetics  Will discuss further work up and plans with staff      Mariela Aguliar MD  [facility] General Surgery, PGY2  8:39 AM

## 2022-05-02 ENCOUNTER — EXTERNAL HOME HEALTH (OUTPATIENT)
Dept: HOME HEALTH SERVICES | Facility: HOSPITAL | Age: 72
End: 2022-05-02
Payer: MEDICARE

## 2022-05-02 LAB
C DIFF GDH STL QL: NEGATIVE
C DIFF TOX A+B STL QL IA: NEGATIVE

## 2022-05-02 PROCEDURE — 96366 THER/PROPH/DIAG IV INF ADDON: CPT

## 2022-05-02 PROCEDURE — 63600175 PHARM REV CODE 636 W HCPCS: Performed by: SURGERY

## 2022-05-02 PROCEDURE — 25000003 PHARM REV CODE 250: Performed by: SURGERY

## 2022-05-02 PROCEDURE — 25000003 PHARM REV CODE 250: Performed by: STUDENT IN AN ORGANIZED HEALTH CARE EDUCATION/TRAINING PROGRAM

## 2022-05-02 PROCEDURE — 99213 PR OFFICE/OUTPT VISIT, EST, LEVL III, 20-29 MIN: ICD-10-PCS | Mod: ,,, | Performed by: STUDENT IN AN ORGANIZED HEALTH CARE EDUCATION/TRAINING PROGRAM

## 2022-05-02 PROCEDURE — 36410 VNPNXR 3YR/> PHY/QHP DX/THER: CPT

## 2022-05-02 PROCEDURE — 96367 TX/PROPH/DG ADDL SEQ IV INF: CPT

## 2022-05-02 PROCEDURE — 63600175 PHARM REV CODE 636 W HCPCS: Performed by: STUDENT IN AN ORGANIZED HEALTH CARE EDUCATION/TRAINING PROGRAM

## 2022-05-02 PROCEDURE — 94761 N-INVAS EAR/PLS OXIMETRY MLT: CPT

## 2022-05-02 PROCEDURE — 96375 TX/PRO/DX INJ NEW DRUG ADDON: CPT

## 2022-05-02 PROCEDURE — 99213 OFFICE O/P EST LOW 20 MIN: CPT | Mod: ,,, | Performed by: STUDENT IN AN ORGANIZED HEALTH CARE EDUCATION/TRAINING PROGRAM

## 2022-05-02 PROCEDURE — 96372 THER/PROPH/DIAG INJ SC/IM: CPT | Performed by: SURGERY

## 2022-05-02 PROCEDURE — C1751 CATH, INF, PER/CENT/MIDLINE: HCPCS

## 2022-05-02 PROCEDURE — 96376 TX/PRO/DX INJ SAME DRUG ADON: CPT

## 2022-05-02 PROCEDURE — A4216 STERILE WATER/SALINE, 10 ML: HCPCS | Performed by: SURGERY

## 2022-05-02 PROCEDURE — G0378 HOSPITAL OBSERVATION PER HR: HCPCS

## 2022-05-02 RX ORDER — LIDOCAINE 50 MG/G
1 PATCH TOPICAL
Status: DISCONTINUED | OUTPATIENT
Start: 2022-05-02 | End: 2022-05-03 | Stop reason: HOSPADM

## 2022-05-02 RX ORDER — SODIUM CHLORIDE 0.9 % (FLUSH) 0.9 %
10 SYRINGE (ML) INJECTION
Status: DISCONTINUED | OUTPATIENT
Start: 2022-05-02 | End: 2022-05-03 | Stop reason: HOSPADM

## 2022-05-02 RX ORDER — SODIUM CHLORIDE 0.9 % (FLUSH) 0.9 %
10 SYRINGE (ML) INJECTION EVERY 6 HOURS
Status: DISCONTINUED | OUTPATIENT
Start: 2022-05-02 | End: 2022-05-03 | Stop reason: HOSPADM

## 2022-05-02 RX ORDER — ENOXAPARIN SODIUM 100 MG/ML
40 INJECTION SUBCUTANEOUS EVERY 24 HOURS
Status: DISCONTINUED | OUTPATIENT
Start: 2022-05-02 | End: 2022-05-03 | Stop reason: HOSPADM

## 2022-05-02 RX ORDER — LIDOCAINE 50 MG/G
1 PATCH TOPICAL
Status: DISCONTINUED | OUTPATIENT
Start: 2022-05-02 | End: 2022-05-03

## 2022-05-02 RX ADMIN — LIDOCAINE 1 PATCH: 50 PATCH CUTANEOUS at 09:05

## 2022-05-02 RX ADMIN — MEROPENEM 1 G: 1 INJECTION, POWDER, FOR SOLUTION INTRAVENOUS at 11:05

## 2022-05-02 RX ADMIN — OXYCODONE 10 MG: 5 TABLET ORAL at 12:05

## 2022-05-02 RX ADMIN — OXYCODONE 10 MG: 5 TABLET ORAL at 06:05

## 2022-05-02 RX ADMIN — METOPROLOL TARTRATE 25 MG: 25 TABLET, FILM COATED ORAL at 09:05

## 2022-05-02 RX ADMIN — Medication 10 ML: at 11:05

## 2022-05-02 RX ADMIN — LOSARTAN POTASSIUM 100 MG: 50 TABLET, FILM COATED ORAL at 09:05

## 2022-05-02 RX ADMIN — OXYCODONE 10 MG: 5 TABLET ORAL at 11:05

## 2022-05-02 RX ADMIN — ATORVASTATIN CALCIUM 40 MG: 40 TABLET, FILM COATED ORAL at 09:05

## 2022-05-02 RX ADMIN — LIDOCAINE 1 PATCH: 50 PATCH CUTANEOUS at 01:05

## 2022-05-02 RX ADMIN — HYDROMORPHONE HYDROCHLORIDE 0.5 MG: 1 INJECTION, SOLUTION INTRAMUSCULAR; INTRAVENOUS; SUBCUTANEOUS at 09:05

## 2022-05-02 RX ADMIN — AMLODIPINE BESYLATE 5 MG: 5 TABLET ORAL at 09:05

## 2022-05-02 RX ADMIN — MEROPENEM 1 G: 1 INJECTION, POWDER, FOR SOLUTION INTRAVENOUS at 04:05

## 2022-05-02 RX ADMIN — HYDROMORPHONE HYDROCHLORIDE 0.5 MG: 1 INJECTION, SOLUTION INTRAMUSCULAR; INTRAVENOUS; SUBCUTANEOUS at 04:05

## 2022-05-02 RX ADMIN — POTASSIUM CHLORIDE, DEXTROSE MONOHYDRATE AND SODIUM CHLORIDE: 150; 5; 900 INJECTION, SOLUTION INTRAVENOUS at 02:05

## 2022-05-02 RX ADMIN — Medication 1 TABLET: at 05:05

## 2022-05-02 RX ADMIN — ENOXAPARIN SODIUM 40 MG: 100 INJECTION SUBCUTANEOUS at 04:05

## 2022-05-02 NOTE — PLAN OF CARE
Pt AAOx4. PRN oxycodone given for complaints of pain. Medications administered per MAR. IVFs and abx infusing. Up to BSC. Urine culture collected at shift change. Safety maintained with bed alarm set, side rails raised, and call light in reach.

## 2022-05-02 NOTE — PROGRESS NOTES
Pharmacist Renal Dose Adjustment Note    Patito Domingo is a 71 y.o. female being treated with the medication enoxaparin    Patient Data:    Vital Signs (Most Recent):  Temp: 98.7 °F (37.1 °C) (05/02/22 0802)  Pulse: 70 (05/02/22 0802)  Resp: 18 (05/02/22 0802)  BP: (!) 151/67 (05/02/22 0802)  SpO2: 96 % (05/02/22 0802)   Vital Signs (72h Range):  Temp:  [98 °F (36.7 °C)-98.7 °F (37.1 °C)]   Pulse:  [54-71]   Resp:  [15-20]   BP: (134-200)/(63-82)   SpO2:  [96 %-100 %]      Recent Labs   Lab 04/30/22  1637 05/01/22  0718   CREATININE 1.0 0.9     Serum creatinine: 0.9 mg/dL 05/01/22 0718  Estimated creatinine clearance: 65.8 mL/min    Medication:Enoxaparin dose: 30mg frequency daily will be changed to medication:enoxaparin dose:40mg frequency:daily    Pharmacist's Name: Mariela Allan  Pharmacist's Extension: [phone]

## 2022-05-02 NOTE — PLAN OF CARE
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.8]    Admission Date: 4/30/2022  Expected Discharge Date:     Discharge Barriers Identified: (P) None    Payor: HU[employer] MANAGED MEDICARE / Plan: HUMANA SNP (SPECIAL NEEDS PLAN) / Product Type: Medicare Advantage /     Extended Emergency Contact Information  Primary Emergency Contact: Matias Mcbride  Mobile Phone: [phone]  Relation: Son  Secondary Emergency Contact: Dallas Domingo   [facility]  Mobile Phone: [phone]  Relation: Son    Discharge Plan A: (P) Home, Home with family, Home Health         [id number] - [facility] [address]  AT [facility] [address]   [address]  [facility] [address]  Phone: [phone] Fax: [phone]    [facility] [address] [facility]  [address] [facility] [address]  Phone: [phone] Fax: [phone]      Initial Assessment (most recent)       Adult Discharge Assessment - 05/02/22 1459          Discharge Assessment    Assessment Type Discharge Planning Assessment (P)      Confirmed/corrected address, phone number and insurance Yes (P)      Confirmed Demographics Correct on Facesheet (P)      Source of Information patient (P)      Does patient/caregiver understand observation status Yes (P)      Communicated JULIO C with patient/caregiver Yes (P)      Lives With alone (P)      Do you expect to return to your current living situation? Yes (P)    Pt reports she will be moving to another apartment rw.    Do you have help at home or someone to help you manage your care at home? No (P)      Prior to hospitilization cognitive status: Alert/Oriented;No Deficits (P)      Current cognitive status: Alert/Oriented;No Deficits (P)      Walking or Climbing Stairs Difficulty  "ambulation difficulty, requires equipment (P)      Dressing/Bathing Difficulty bathing difficulty, requires equipment (P)      Home Accessibility not wheelchair accessible (P)      Equipment Currently Used at Home walker, rolling;wheelchair;shower chair;grab bar (P)      Patient currently being followed by outpatient case management? No (P)      Do you currently have service(s) that help you manage your care at home? No (P)      Do you take prescription medications? Yes (P)      Do you have any problems affording any of your prescribed medications? No (P)      Is the patient taking medications as prescribed? yes (P)      How do you get to doctors appointments? public transportation;health plan transportation (P)    Pt reports she uses [facility] [employer] transportation.    Are you on dialysis? No (P)      Discharge Plan A Home;Home with family;Home Health (P)      DME Needed Upon Discharge  none (P)      Discharge Plan discussed with: Patient (P)      Discharge Barriers Identified None (P)                         Future Appointments   Date Time Provider Department Center   5/5/2022  8:00 AM Amy Bright NP [address]   9/16/2022 10:00 AM Ervin Parikh MD [facility] UROLOGY [facility]     BP (!) 147/63 (Patient Position: Lying)   Pulse 63   Temp 98.2 °F (36.8 °C) (Oral)   Resp 18   Ht 5' 6" (1.676 m)   Wt 87.8 kg (193 lb 9 oz)   LMP  (LMP Unknown)   SpO2 98%   Breastfeeding No   BMI 31.24 kg/m²      amLODIPine  5 mg Oral Daily    atorvastatin  40 mg Oral QHS    enoxaparin  40 mg Subcutaneous Daily    iron-vit c-b12-folic acid  1 tablet Oral Daily    LIDOcaine  1 patch Transdermal Q24H    LIDOcaine  1 patch Transdermal Q24H    losartan  100 mg Oral Daily    meropenem (MERREM) IVPB  1 g Intravenous Q8H    metoprolol tartrate  25 mg Oral BID               "

## 2022-05-02 NOTE — CONSULTS
[facility] Surg  Urology  Consult Note    Patient Name: Patito Domingo  MRN: [id number]  Admission Date: 2022  Hospital Length of Stay: 0 days  Code Status: Full Code   Attending Provider: Chris Herbert MD   Consulting Provider: Alexia Dumas MD  Primary Care Physician: Yasir Myers Jr, MD  Principal Problem:<principal problem not specified>    Consults    Subjective:     HPI: 71 y.o. female with PMH below, my partner Dr. Parikh with urology has seen as inpt consult and in clinic. She has a known history of kidney stones. ucx klebsiella 3/27/22; hafnia alvei 3/1/22, klebsiella 22. 3/18/22:  Follow up [facility] showed no hydronephrosis, recent choledochalithiasis and ERCP to removed, CT before and after shoed no hydronephrosis and stable renal stones.    22 CT demonstrates  No hydronephrosis, right extrarenal pelvis, and known history of multiple stones bilaterally. No hydroureter or ureteral stones.     ucx pending.       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)        Past Surgical History:   Procedure Laterality Date    ABDOMINAL SURGERY      CATARACT EXTRACTION Left 10/2017     SECTION      CHOLECYSTECTOMY      COLON SURGERY      cystoscope      CYSTOSCOPY W/ RETROGRADES Right 10/10/2019    Procedure: CYSTOSCOPY, WITH RETROGRADE PYELOGRAM;  Surgeon: Gen Isbell MD;  Location: [facility] OR;  Service: Urology;  Laterality: Right;    ERCP N/A 2021    Procedure: ERCP (ENDOSCOPIC RETROGRADE CHOLANGIOPANCREATOGRAPHY);  Surgeon: Jayjay Rivera MD;  Location: [facility] ENDO [address]);  Service: Endoscopy;  Laterality: N/A;    ERCP N/A 3/4/2022     Procedure: ERCP (ENDOSCOPIC RETROGRADE CHOLANGIOPANCREATOGRAPHY);  Surgeon: Roby Virgen MD;  Location: [facility] ENDO ([address] [place] [address]);  Service: Endoscopy;  Laterality: N/A;    EYE SURGERY      HYSTERECTOMY  5/1996    LITHOTRIPSY      LIVER BIOPSY  9/14    carcinoid    URETEROSCOPY Right 10/10/2019    Procedure: URETEROSCOPY;  Surgeon: Gen Isbell MD;  Location: [facility] OR;  Service: Urology;  Laterality: Right;    UTERINE FIBROID SURGERY         Review of patient's allergies indicates:   Allergen Reactions    Contrast media Hives, Itching and Swelling    Epinephrine Anaphylaxis     Can cause  a Carcinoid Crisis    Ibuprofen Hives, Itching and Swelling    Zofran [ondansetron hcl] Itching     And multiple other reactions    Iodinated contrast media     Morphine Other (See Comments)    Sulfa (sulfonamide antibiotics) Hives, Itching and Swelling       Family History     Problem Relation (Age of Onset)    Alzheimer's disease Father    Cancer Mother    No Known Problems Son, Son, Son, Son    Stroke Sister          Tobacco Use    Smoking status: Never Smoker    Smokeless tobacco: Never Used   Substance and Sexual Activity    Alcohol use: No     Alcohol/week: 0.0 standard drinks    Drug use: No    Sexual activity: Not Currently       Review of Systems   Constitutional: Negative for diaphoresis.   HENT: Negative for facial swelling.    Eyes: Negative for visual disturbance.   Respiratory: Negative for shortness of breath.    Cardiovascular: Negative for chest pain.   Gastrointestinal: Negative for abdominal distention.   Musculoskeletal: Negative for gait problem.   Skin: Negative for color change.   Neurological: Negative for speech difficulty.   Hematological: Does not bruise/bleed easily.   Psychiatric/Behavioral: Negative for agitation and behavioral problems.       Objective:     Temp:  [98.2 °F (36.8 °C)-98.7 °F (37.1 °C)] 98.3 °F (36.8 °C)  Pulse:  [54-70] 69  Resp:  [16-20] 18  SpO2:  [96 %-100 %] 100  %  BP: (142-173)/(63-76) 173/73     Body mass index is 31.24 kg/m².    Date 05/02/22 0700 - 05/03/22 0659   Shift 3367-3137 2037-3994 4578-2835 24 Hour Total   INTAKE   Shift Total(mL/kg)       OUTPUT   Urine(mL/kg/hr) 700(1) 300  1000   Shift Total(mL/kg) 700(8) 300(3.4)  1000(11.4)   Weight (kg) 87.8 87.8 87.8 87.8          Lines/Drains/Airways     Peripheral Intravenous Line  Duration                Midline Catheter Insertion/Assessment  - Single Lumen 05/02/22 1540 Right basilic vein (medial side of arm) other (see comments) <1 day         Peripheral IV - Single Lumen 05/02/22 0702 Anterior;Proximal;Right Forearm <1 day                Physical Exam  Constitutional:       Appearance: She is well-developed.   HENT:      Head: Normocephalic and atraumatic.   Eyes:      Pupils: Pupils are equal, round, and reactive to light.   Pulmonary:      Effort: Pulmonary effort is normal. No respiratory distress.   Abdominal:      General: There is no distension.      Palpations: Abdomen is soft.   Musculoskeletal:         General: Normal range of motion.      Cervical back: Normal range of motion and neck supple.   Skin:     General: Skin is warm and dry.   Neurological:      Mental Status: She is alert and oriented to person, place, and time.   Psychiatric:         Behavior: Behavior normal.         Significant Labs:  BMP:  Recent Labs   Lab 04/30/22  1637 05/01/22  0718    140   K 4.1 4.2    109   CO2 20* 21*   BUN 17 14   CREATININE 1.0 0.9   CALCIUM 9.8 9.5       CBC:  Recent Labs   Lab 04/30/22  1637 05/01/22  0718   WBC 5.08 4.01   HGB 9.5* 9.9*   HCT 30.5* 32.0*    191          Assessment and Plan:     Plan:  1. Patient is a previous patient of Dr. Marques. Pt states Dr. Marques does not perform surgeries anymore and is too far and would prefer urologic followup with Dr. Parikh. Schedule in 1 month f/u with Dr. Parikh in clinic.   2. Urine culture pending. Urinalysis overall benign. If true UTI, treat  with culture specific abx.  3. Pt states has longstanding history of bothersome urinary frequency, can address with Dr. Parikh I the future.   4. No urgent urologic procedure indicated.   Active Diagnoses:    Diagnosis Date Noted POA    Arm swelling [M79.89]  Yes    Dilation of biliary tract [K83.8] 03/02/2022 Yes    Chronic abdominal pain [R10.9, G89.29] 02/05/2020 Yes    Anemia of chronic disease [D63.8] 11/19/2019 Yes     Chronic    Right lower quadrant pain [R10.31] 10/10/2019 Yes    Nephrolithiasis [N20.0] 10/10/2019 Yes    Chronic pain syndrome [G89.4] 12/02/2018 Yes    Metastatic carcinoid tumor [C7B.00] 01/27/2015 Yes     Chronic    Neuroendocrine carcinoma, unknown primary site [C7A.8] 12/01/2014 Yes      Problems Resolved During this Admission:       VTE Risk Mitigation (From admission, onward)         Ordered     enoxaparin injection 40 mg  Daily         05/02/22 0950     IP VTE HIGH RISK PATIENT  Once         04/30/22 2033     Place sequential compression device  Until discontinued         04/30/22 2033                Thank you for your consult.     Alexia Dumas MD  Urology  [place] [facility]

## 2022-05-02 NOTE — PROGRESS NOTES
Neuroendocrine Surgery Progress Note  Admit Date: 4/30/2022      S:  NAEO   Pain better this morning  +BM, voiding   Denies dysuria   Pain mostly in shoulder areas, thinks it's muscular in nature     O:  Vitals:   Temp:  [98.2 °F (36.8 °C)-98.6 °F (37 °C)]   Pulse:  [54-68]   Resp:  [16-20]   BP: (134-192)/(63-82)   SpO2:  [97 %-99 %]     Diet Adult Regular (IDDSI Level 7)   Intake/Output Summary (Last 24 hours) at 5/2/2022 0637  Last data filed at 5/2/2022 0532  Gross per 24 hour   Intake 1513.38 ml   Output 1400 ml   Net 113.38 ml            Physical Exam:  Gen: NAD, AAOx3  HEENT: EOMI, NCAT  CV: RR  Resp: no shortness of breath, normal WOB  Abd: soft, non-distended, TTP RUQ   Ext: no edema      Labs:  Recent Labs     04/30/22  1637 05/01/22  0718   WBC 5.08 4.01   HGB 9.5* 9.9*   HCT 30.5* 32.0*    191    140   K 4.1 4.2    109   CO2 20* 21*   BUN 17 14   CREATININE 1.0 0.9   BILITOT 0.9 0.9   AST 15 12   ALT 10 6*   ALKPHOS 105 103   CALCIUM 9.8 9.5   ALBUMIN 3.4* 3.1*   PROT 7.2 6.9   MG 1.9  --      Scheduled Meds: amLODIPine, 5 mg, Daily  atorvastatin, 40 mg, QHS  cefOXitin (MEFOXIN) in D5W 500 mL CONTINUOUS INFUSION, 12 g, Q24H  enoxaparin, 30 mg, Daily  iron-vit c-b12-folic acid, 1 tablet, Daily  losartan, 100 mg, Daily  metoprolol tartrate, 25 mg, BID       dextrose 5 % and 0.9 % NaCl with KCl 20 mEq 50 mL/hr at 05/02/22 0252       A/P:  70 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 Now w/ pneumobilia likely related to sphincterotomy    -Continue pain control   -Regular diet  -Home medications  -Add lidocaine patch for shoulder pain   -Possible endoscopy, will discuss with staff       Rosy Glynn MD  [employer] General Surgery, [facility]

## 2022-05-03 VITALS
RESPIRATION RATE: 18 BRPM | DIASTOLIC BLOOD PRESSURE: 65 MMHG | TEMPERATURE: 98 F | OXYGEN SATURATION: 99 % | SYSTOLIC BLOOD PRESSURE: 146 MMHG | BODY MASS INDEX: 30.82 KG/M2 | HEIGHT: 66 IN | WEIGHT: 191.81 LBS | HEART RATE: 68 BPM

## 2022-05-03 LAB
BACTERIA UR CULT: NORMAL
BACTERIA UR CULT: NORMAL

## 2022-05-03 PROCEDURE — A4216 STERILE WATER/SALINE, 10 ML: HCPCS | Performed by: SURGERY

## 2022-05-03 PROCEDURE — 96366 THER/PROPH/DIAG IV INF ADDON: CPT

## 2022-05-03 PROCEDURE — 25000003 PHARM REV CODE 250: Performed by: SURGERY

## 2022-05-03 PROCEDURE — 94761 N-INVAS EAR/PLS OXIMETRY MLT: CPT

## 2022-05-03 PROCEDURE — 25000003 PHARM REV CODE 250: Performed by: STUDENT IN AN ORGANIZED HEALTH CARE EDUCATION/TRAINING PROGRAM

## 2022-05-03 PROCEDURE — G0378 HOSPITAL OBSERVATION PER HR: HCPCS

## 2022-05-03 PROCEDURE — 63600175 PHARM REV CODE 636 W HCPCS: Performed by: STUDENT IN AN ORGANIZED HEALTH CARE EDUCATION/TRAINING PROGRAM

## 2022-05-03 RX ORDER — LIDOCAINE 50 MG/G
1 PATCH TOPICAL DAILY
Qty: 8 PATCH | Refills: 0 | OUTPATIENT
Start: 2022-05-03 | End: 2022-05-05 | Stop reason: SDUPTHER

## 2022-05-03 RX ORDER — LIDOCAINE 50 MG/G
1 PATCH TOPICAL
Status: DISCONTINUED | OUTPATIENT
Start: 2022-05-03 | End: 2022-05-03 | Stop reason: HOSPADM

## 2022-05-03 RX ADMIN — OXYCODONE 10 MG: 5 TABLET ORAL at 04:05

## 2022-05-03 RX ADMIN — MEROPENEM 1 G: 1 INJECTION, POWDER, FOR SOLUTION INTRAVENOUS at 08:05

## 2022-05-03 RX ADMIN — OXYCODONE 10 MG: 5 TABLET ORAL at 08:05

## 2022-05-03 RX ADMIN — METOPROLOL TARTRATE 25 MG: 25 TABLET, FILM COATED ORAL at 08:05

## 2022-05-03 RX ADMIN — LIDOCAINE 1 PATCH: 50 PATCH CUTANEOUS at 08:05

## 2022-05-03 RX ADMIN — OXYCODONE 10 MG: 5 TABLET ORAL at 12:05

## 2022-05-03 RX ADMIN — Medication 1 TABLET: at 06:05

## 2022-05-03 RX ADMIN — LOSARTAN POTASSIUM 100 MG: 50 TABLET, FILM COATED ORAL at 08:05

## 2022-05-03 RX ADMIN — POTASSIUM CHLORIDE, DEXTROSE MONOHYDRATE AND SODIUM CHLORIDE: 150; 5; 900 INJECTION, SOLUTION INTRAVENOUS at 04:05

## 2022-05-03 RX ADMIN — AMLODIPINE BESYLATE 5 MG: 5 TABLET ORAL at 08:05

## 2022-05-03 RX ADMIN — Medication 10 ML: at 06:05

## 2022-05-03 NOTE — PROGRESS NOTES
[facility]                    Pharmacy       Discharge Medication Education    Patient ACCEPTED medication education. Pharmacy has provided education on the name, indication, and possible side effects of the medication(s) prescribed, using teach-back method.     The following medications have also been discussed, during this admission.        Medication List        CHANGE how you take these medications      meclizine 25 mg tablet  Commonly known as: ANTIVERT  TAKE 1 TABLET(25 MG) BY MOUTH THREE TIMES DAILY AS NEEDED FOR DIZZINESS  What changed:   how much to take  how to take this  when to take this  reasons to take this  additional instructions            CONTINUE taking these medications      alcohol swabs Padm  Commonly known as: BD ALCOHOL SWABS  Apply 1 each topically as needed.     amLODIPine 5 MG tablet  Commonly known as: NORVASC  Take 1 tablet (5 mg total) by mouth once daily.     atorvastatin 40 MG tablet  Commonly known as: LIPITOR  Take 1 tablet (40 mg total) by mouth every evening.     BLOOD PRESSURE CUFF Misc  Generic drug: miscellaneous medical supply  1 each by Misc.(Non-Drug; Combo Route) route once daily.     cyanocobalamin 1000 MCG tablet  Commonly known as: VITAMIN B-12  Take 1 tablet (1,000 mcg total) by mouth once daily.     diclofenac sodium 1 % Gel  Commonly known as: VOLTAREN  Apply 2 g topically 4 (four) times daily as needed (area of pain).     diphenoxylate-atropine 2.5-0.025 mg 2.5-0.025 mg per tablet  Commonly known as: LOMOTIL  TAKE 1 TABLET BY MOUTH FOUR TIMES DAILY AS NEEDED     losartan 100 MG tablet  Commonly known as: COZAAR  TAKE 1 TABLET (100 MG TOTAL) BY MOUTH ONCE DAILY.     magnesium oxide 400 mg (241.3 mg magnesium) tablet  Commonly known as: MAG-OX  Take 1 tablet (400 mg total) by mouth 2 (two) times daily.     metoprolol tartrate 25 MG tablet  Commonly known as: LOPRESSOR  TAKE 1 TABLET TWICE DAILY     oxyCODONE 15 MG Tab  Commonly known as:  ROXICODONE               Thank you  Brice Ramirez, PharmD

## 2022-05-03 NOTE — PLAN OF CARE
.Discharge orders noted. Additional clinical references attached. Patient's discharge instructions given by bedside RN and reviewed by this VN with patient. Education provided on home care instructions, new and previous medications, diagnosis, when to seek medical attention, and follow-up appointments. New medications delivered by pharmacy. Patient verbalized understanding and teach back method was used. Patient's family to provide ride/transportation home. All questions answered. Transport to [facility] requested. Floor nurse notified.

## 2022-05-03 NOTE — PHARMACY MED REC
"Admission Medication History     The home medication history was taken by Brice Ramirez.    You may go to "Admission" then "Reconcile Home Medications" tabs to review and/or act upon these items.      The home medication list has been updated by the Pharmacy department.    Please read ALL comments highlighted in Red.    Please address this information as you see fit.     Feel free to contact us if you have any questions or require assistance.      The medications listed below were removed from the home medication list. Please reorder if appropriate:  Patient reports no longer taking the following medication(s):   N/A      Medications listed below were obtained from: Patient/family  PTA Medications   Medication Sig    amLODIPine (NORVASC) 5 MG tablet Take 1 tablet (5 mg total) by mouth once daily.    atorvastatin (LIPITOR) 40 MG tablet Take 1 tablet (40 mg total) by mouth every evening.    cyanocobalamin (VITAMIN B-12) 1000 MCG tablet Take 1 tablet (1,000 mcg total) by mouth once daily.    diclofenac sodium (VOLTAREN) 1 % Gel Apply 2 g topically 4 (four) times daily as needed (area of pain).    diphenoxylate-atropine 2.5-0.025 mg (LOMOTIL) 2.5-0.025 mg per tablet TAKE 1 TABLET BY MOUTH FOUR TIMES DAILY AS NEEDED    losartan (COZAAR) 100 MG tablet TAKE 1 TABLET (100 MG TOTAL) BY MOUTH ONCE DAILY.    magnesium oxide (MAG-OX) 400 mg (241.3 mg magnesium) tablet Take 1 tablet (400 mg total) by mouth 2 (two) times daily.    meclizine (ANTIVERT) 25 mg tablet TAKE 1 TABLET(25 MG) BY MOUTH THREE TIMES DAILY AS NEEDED FOR DIZZINESS (Patient taking differently: Take 25 mg by mouth 3 (three) times daily as needed for Dizziness.)    metoprolol tartrate (LOPRESSOR) 25 MG tablet TAKE 1 TABLET TWICE DAILY    oxyCODONE (ROXICODONE) 15 MG Tab Take 15 mg by mouth every 4 (four) hours as needed (chronic abdominal pain, malignancy related).    alcohol swabs (BD ALCOHOL SWABS) PadM Apply 1 each topically as needed.    " BLOOD PRESSURE CUFF Misc 1 each by Misc.(Non-Drug; Combo Route) route once daily.       Potential issues to be addressed PRIOR TO DISCHARGE  N/A    Robert PerryD              .

## 2022-05-03 NOTE — PLAN OF CARE
"[facility] - Med Surg  Discharge Final Note    Primary Care Provider: Yasir Myers Jr, MD    Expected Discharge Date: 5/3/2022    Final Discharge Note (most recent)       Final Note - 05/03/22 1313          Final Note    Assessment Type Final Discharge Note (P)      Anticipated Discharge Disposition Home-Health Care Svc (P)      Hospital Resources/Appts/Education Provided Appointments scheduled and added to AVS (P)         Post-Acute Status    Post-Acute Authorization Home Health (P)      Home Health Status Pending Clinical Review (P)    Pt was current with [facility]. Referral sent via [facility]port.    Discharge Delays Orders Needed (P)    Orders needed for DC. Transport will need to be set up for DC,                  BP (!) 142/63 (Patient Position: Lying)   Pulse (!) 53   Temp 97.5 °F (36.4 °C) (Oral)   Resp 18   Ht 5' 6" (1.676 m)   Wt 87 kg (191 lb 12.8 oz)   LMP  (LMP Unknown)   SpO2 99%   Breastfeeding No   BMI 30.96 kg/m²     Future Appointments   Date Time Provider Department Center   5/5/2022  8:00 AM Amy Bright NP [facility]   9/16/2022 10:00 AM Ervin Parikh MD [facility] UROLOGY [facility]     Referral also sent via [facility]port to [facility] if Meropenem is still needed upon DC.    Contact Info       Ervin Parikh MD   Specialty: Urology    [address]  [address]  [facility] [address]   Phone: [phone]       Next Steps: Go in 1 month(s)    Instructions: UROLOGY APPOINTMENT AFTER DISCHARGE    Amy Bright NP   Specialty: Internal Medicine    [address] [address]   Phone: [phone]       Next Steps: Go on 5/5/2022    Instructions: NURSE PRACTIONER AT HOME PROGRAM    [facility] [address] [address]   Phone: [phone]       Next Steps: Schedule an appointment as soon as possible for a visit    Instructions: As needed, HOME HEALTH, OFFICE TO CALL PATIENT/FAMILY TO SET UP APPT TIME           "   Medication List        ASK your doctor about these medications      alcohol swabs Padm  Commonly known as: BD ALCOHOL SWABS  Apply 1 each topically as needed.     amLODIPine 5 MG tablet  Commonly known as: NORVASC  Take 1 tablet (5 mg total) by mouth once daily.     atorvastatin 40 MG tablet  Commonly known as: LIPITOR  Take 1 tablet (40 mg total) by mouth every evening.     BLOOD PRESSURE CUFF Misc  Generic drug: miscellaneous medical supply  1 each by Misc.(Non-Drug; Combo Route) route once daily.     cyanocobalamin 1000 MCG tablet  Commonly known as: VITAMIN B-12  Take 1 tablet (1,000 mcg total) by mouth once daily.     diclofenac sodium 1 % Gel  Commonly known as: VOLTAREN  Apply 2 g topically 4 (four) times daily as needed (area of pain).     diphenoxylate-atropine 2.5-0.025 mg 2.5-0.025 mg per tablet  Commonly known as: LOMOTIL  TAKE 1 TABLET BY MOUTH FOUR TIMES DAILY AS NEEDED     losartan 100 MG tablet  Commonly known as: COZAAR  TAKE 1 TABLET (100 MG TOTAL) BY MOUTH ONCE DAILY.     magnesium oxide 400 mg (241.3 mg magnesium) tablet  Commonly known as: MAG-OX  Take 1 tablet (400 mg total) by mouth 2 (two) times daily.     meclizine 25 mg tablet  Commonly known as: ANTIVERT  TAKE 1 TABLET(25 MG) BY MOUTH THREE TIMES DAILY AS NEEDED FOR DIZZINESS     metoprolol tartrate 25 MG tablet  Commonly known as: LOPRESSOR  TAKE 1 TABLET TWICE DAILY     oxyCODONE 15 MG Tab  Commonly known as: ROXICODONE

## 2022-05-03 NOTE — DISCHARGE SUMMARY
[facility] Surg  Neuroendocrine surgery  Discharge Summary      Patient Name: Patito Domingo  MRN: [id number]  Admission Date: 4/30/2022  Hospital Length of Stay: 0 days  Discharge Date and Time: 5/3/2022  7:30 PM  Attending Physician: Carol Herbert MD   Discharging Provider: Ira Glynn MD  Primary Care Provider: Yasir Myers Jr, MD        HPI:   Patient is a 71F PMHx  metastatic neuroendocrine tumor status post cytoreduction 2021, recent admission for biliary obstruction secondary to choledocholithiasis status post ERCP and sphincterotomy who presented on 05/01/2022 with abdominal pain.  Of note, she develops abdominal pain after her episode of biliary obstruction.  She was seen in the emergency department multiple times for this.  Also with a history of complicated urinary tract infection secondary to nephrolithiasis.  CT of abdomen pelvis showed a new finding of pneumobilia which was likely from her recent ERCP and sphincterotomy.  However, due to her pain she was admitted for observation.  Urinalysis did show positive nitrites and patient was placed on IV antibiotics.  She was observed over the following days with the pain improving.  Urology was consulted for her history of a complicated UTI secondary to nephrolithiasis.  She is instructed to follow-up with them in 1 month.  Her urine cultures were negative and antibiotics were discontinued.  Also had a negative C diff.  She was discharged home with instructions follow-up with Urology.     Consults:   Consults (From admission, onward)        Status Ordering Provider     Inpatient consult to PICC team (NIAS)  Once        Provider:  (Not yet assigned)    Acknowledged CAROL HERBERT     Inpatient consult to Urology  Once        Provider:  Alexia Dumas MD    Completed IRA GLYNN     IP consult to case management  Once        Provider:  (Not yet assigned)    Acknowledged CAROL HERBERT          Final Active  Diagnoses:    Diagnosis Date Noted POA    PRINCIPAL PROBLEM:  Neuroendocrine carcinoma, unknown primary site [C7A.8] 12/01/2014 Yes    Arm swelling [M79.89]  Yes    Dilation of biliary tract [K83.8] 03/02/2022 Yes    Chronic abdominal pain [R10.9, G89.29] 02/05/2020 Yes    Anemia of chronic disease [D63.8] 11/19/2019 Yes     Chronic    Right lower quadrant pain [R10.31] 10/10/2019 Yes    Nephrolithiasis [N20.0] 10/10/2019 Yes    Chronic pain syndrome [G89.4] 12/02/2018 Yes    Metastatic carcinoid tumor [C7B.00] 01/27/2015 Yes     Chronic      Problems Resolved During this Admission:      Discharged Condition: stable    Disposition: Home or Self Care    Follow Up:   Follow-up Information     Ervin Parikh MD. Go in 1 month(s).    Specialty: Urology  Why: UROLOGY APPOINTMENT AFTER DISCHARGE  Contact information:  [address] [facility]  [address]  [facility] [address]  [phone]             Amy A Kaila, NP. Go on 5/5/2022.    Specialty: Internal Medicine  Why: NURSE PRACTIONER AT HOME PROGRAM  Contact information:  [facility] [facility] [address]  [phone]             [facility]. Schedule an appointment as soon as possible for a visit.    Why: As needed, HOME HEALTH, OFFICE TO CALL PATIENT/FAMILY TO SET UP APPT TIME  Contact information:  [address] [facility] [address] [phone]                     Patient Instructions:      Diet Adult Regular     Notify your health care provider if you experience any of the following:  temperature >100.4     Notify your health care provider if you experience any of the following:  persistent nausea and vomiting or diarrhea     Notify your health care provider if you experience any of the following:  severe uncontrolled pain     Activity as tolerated     Medications:  Reconciled Home Medications:      Medication List      START taking these medications    LIDOcaine 5 %  Commonly known as:  LIDODERM  Place 1 patch onto the skin once daily. Remove & Discard patch within 12 hours or as directed by MD for 8 days        CHANGE how you take these medications    meclizine 25 mg tablet  Commonly known as: ANTIVERT  TAKE 1 TABLET(25 MG) BY MOUTH THREE TIMES DAILY AS NEEDED FOR DIZZINESS  What changed:   · how much to take  · how to take this  · when to take this  · reasons to take this  · additional instructions        CONTINUE taking these medications    alcohol swabs Padm  Commonly known as: BD ALCOHOL SWABS  Apply 1 each topically as needed.     amLODIPine 5 MG tablet  Commonly known as: NORVASC  Take 1 tablet (5 mg total) by mouth once daily.     atorvastatin 40 MG tablet  Commonly known as: LIPITOR  Take 1 tablet (40 mg total) by mouth every evening.     BLOOD PRESSURE CUFF Misc  Generic drug: miscellaneous medical supply  1 each by Misc.(Non-Drug; Combo Route) route once daily.     cyanocobalamin 1000 MCG tablet  Commonly known as: VITAMIN B-12  Take 1 tablet (1,000 mcg total) by mouth once daily.     diclofenac sodium 1 % Gel  Commonly known as: VOLTAREN  Apply 2 g topically 4 (four) times daily as needed (area of pain).     diphenoxylate-atropine 2.5-0.025 mg 2.5-0.025 mg per tablet  Commonly known as: LOMOTIL  TAKE 1 TABLET BY MOUTH FOUR TIMES DAILY AS NEEDED     losartan 100 MG tablet  Commonly known as: COZAAR  TAKE 1 TABLET (100 MG TOTAL) BY MOUTH ONCE DAILY.     magnesium oxide 400 mg (241.3 mg magnesium) tablet  Commonly known as: MAG-OX  Take 1 tablet (400 mg total) by mouth 2 (two) times daily.     metoprolol tartrate 25 MG tablet  Commonly known as: LOPRESSOR  TAKE 1 TABLET TWICE DAILY     oxyCODONE 15 MG Tab  Commonly known as: ROXICODONE  Take 15 mg by mouth every 4 (four) hours as needed (chronic abdominal pain, malignancy related).            Significant Diagnostic Studies:  CT the abdomen pelvis with pneumobilia    Pending Diagnostic Studies:     None        Indwelling Lines/Drains at  time of discharge:   Lines/Drains/Airways     None                 Time spent on the discharge of patient: 30 minutes         Rosy Glynn MD  [employer] General surgery

## 2022-05-03 NOTE — PLAN OF CARE
[facility] - Med Surg  Discharge Final Note    Primary Care Provider: Yasir Myers Jr, MD    Expected Discharge Date: 5/3/2022    Final Discharge Note (most recent)       Final Note - 05/03/22 1620          Post-Acute Status    Post-Acute Authorization Home Health;IV Infusion (P)      Home Health Status Set-up Complete/Auth obtained (P)    Update [facility] RVP. Pt OBS. No new orders needed.    IV Infusion Status Patient declined/refused (P)    No IV ABX noted on DC meds. Pt has midline currently. Awatiting okay to removed from attending team to be followed up by nursing prior to removal. TN alerted nursing to need to verify with attending prior to removal of midline.    Discharge Delays PFC Arranged Transportation (P)    PFC request for now  via [employer] van for transport home,                    Important Message from Medicare             Contact Info       Ervin Parikh MD   Specialty: Urology    [address]  [address]  [facility] [address]   Phone: [phone]       Next Steps: Go in 1 month(s)    Instructions: UROLOGY APPOINTMENT AFTER DISCHARGE    Amy Bright NP   Specialty: Internal Medicine    [facility] [address]   Phone: [phone]       Next Steps: Go on 5/5/2022    Instructions: NURSE PRACTIONER AT HOME PROGRAM    Chris Herbert MD   Specialty: General Surgery    [address]  [address]  [facility] [address]   Phone: [phone]       Next Steps: Go on 5/19/2022    Instructions: FOLLOW UP WITH GENERAL SURGERY AFTER DISCHARGE    [facility] [address] [address]   Phone: [phone]       Next Steps: Schedule an appointment as soon as possible for a visit    Instructions: As needed, HOME HEALTH, OFFICE TO CALL PATIENT/FAMILY TO SET UP APPT TIME

## 2022-05-05 ENCOUNTER — CARE AT HOME (OUTPATIENT)
Dept: HOME HEALTH SERVICES | Facility: CLINIC | Age: 72
End: 2022-05-05
Payer: MEDICARE

## 2022-05-05 VITALS — SYSTOLIC BLOOD PRESSURE: 124 MMHG | OXYGEN SATURATION: 97 % | DIASTOLIC BLOOD PRESSURE: 62 MMHG | HEART RATE: 77 BPM

## 2022-05-05 DIAGNOSIS — G89.29 CHRONIC RIGHT SHOULDER PAIN: ICD-10-CM

## 2022-05-05 DIAGNOSIS — M25.511 CHRONIC RIGHT SHOULDER PAIN: ICD-10-CM

## 2022-05-05 DIAGNOSIS — R19.7 DIARRHEA, UNSPECIFIED TYPE: ICD-10-CM

## 2022-05-05 PROCEDURE — 99350 HOME/RES VST EST HIGH MDM 60: CPT | Mod: S$GLB,,, | Performed by: NURSE PRACTITIONER

## 2022-05-05 PROCEDURE — 99350 PR HOME VISIT,ESTAB PATIENT,LEVEL IV: ICD-10-PCS | Mod: S$GLB,,, | Performed by: NURSE PRACTITIONER

## 2022-05-05 RX ORDER — LIDOCAINE 50 MG/G
1 PATCH TOPICAL DAILY
Qty: 30 PATCH | Refills: 2 | Status: ON HOLD | OUTPATIENT
Start: 2022-05-05 | End: 2022-05-20 | Stop reason: HOSPADM

## 2022-05-05 NOTE — PROGRESS NOTES
[facility] @ [facility]  Medical Home Visit    Visit Date: 5/5/2022  Encounter Provider: Amy Bright  PCP:  Yasir Myers Jr, MD    Subjective:      Patient ID: Patito Domingo is a 71 y.o. female.    Consult Requested By:  No ref. provider found  Reason for Consult:  Follow up Diarrhea    Patito is being seen at home due to being seen at home due to physical debility that presents a taxing effort to leave the home, to mitigate high risk of hospital readmission and/or due to the limited availability of reliable or safe options for transportation to the point of access to the provider. Prior to treatment on this visit the chart was reviewed and patient verbal consent was obtained.    Chief Complaint: Diarrhea and Shoulder Pain      HPI       Review of Systems   Constitutional: Negative for activity change, fatigue and fever.   HENT: Negative.    Eyes: Negative.    Respiratory: Negative for chest tightness.    Cardiovascular: Negative.  Negative for leg swelling.   Gastrointestinal: Positive for diarrhea (chronic per pt).   Endocrine: Negative.    Genitourinary: Negative.    Musculoskeletal: Positive for arthralgias (right shoulder) and gait problem.   Skin: Negative.    Allergic/Immunologic: Negative.    Hematological: Negative.    Psychiatric/Behavioral: Negative.  Negative for agitation.   All other systems reviewed and are negative.      Assessments:  · Environmental: first floor apartment, lives alone  · Functional Status: requires minimal assistance  · Safety: no noted issues  · Nutritional: adequate available  · Home Health/DME/Supplies: [facility], walker    Objective:   Physical Exam  Vitals reviewed.   Constitutional:       General: She is not in acute distress.     Appearance: She is well-developed.   HENT:      Head: Normocephalic and atraumatic.   Eyes:      Pupils: Pupils are equal, round, and reactive to light.   Cardiovascular:      Rate and Rhythm: Normal rate and regular rhythm.      Heart sounds:  Normal heart sounds.   Pulmonary:      Effort: Pulmonary effort is normal.      Breath sounds: Normal breath sounds.   Abdominal:      General: Bowel sounds are normal.      Palpations: Abdomen is soft.   Musculoskeletal:         General: Normal range of motion.      Cervical back: Normal range of motion and neck supple.   Skin:     General: Skin is warm and dry.   Neurological:      Mental Status: She is alert and oriented to person, place, and time.      Cranial Nerves: No cranial nerve deficit.   Psychiatric:         Behavior: Behavior normal.         Thought Content: Thought content normal.         Judgment: Judgment normal.         Vitals:    05/05/22 1555   BP: 124/62   Pulse: 77   SpO2: 97%     There is no height or weight on file to calculate BMI.    Assessment:     1. Chronic right shoulder pain    2. Diarrhea, unspecified type        Plan:            Problem List Items Addressed This Visit        GI    Diarrhea    Current Assessment & Plan     Chronic related to tumor  Using immodium prn  Monitor              Orthopedic    Shoulder pain    Current Assessment & Plan     Reports chronic since 2019  Flares at times  Using Voltaren with minimal relief  Lidocaine patches in hospital were effective  Continue use of Lidocaine patch.                    Were controlled substances prescribed?  No    Follow Up Appointments:   Future Appointments   Date Time Provider Department Center   5/19/2022 10:20 AM Chris Herbert MD [facility] TUMOR [facility]   6/1/2022  9:30 AM Ervin Parikh MD [facility] UROLOGY [facility]       Signature: Amy Bright NP

## 2022-05-05 NOTE — ASSESSMENT & PLAN NOTE
Reports chronic since 2019  Flares at times  Using Voltaren with minimal relief  Lidocaine patches in hospital were effective  Continue use of Lidocaine patch.

## 2022-05-12 ENCOUNTER — TELEPHONE (OUTPATIENT)
Dept: NEUROLOGY | Facility: HOSPITAL | Age: 72
End: 2022-05-12
Payer: MEDICARE

## 2022-05-12 NOTE — TELEPHONE ENCOUNTER
----- Message from Bella Chacon sent at 5/11/2022  2:16 PM CDT -----  Contact: [phone]  Type:  Needs Medical Advice    Who Called: pt called   Would the patient rather a call back or a response via [employer]? Call back  Best Call Back Number: [phone]  Additional Information: pt needs to speak to nurse in regards to pt's stomach pain. Please call pt to advice

## 2022-05-12 NOTE — TELEPHONE ENCOUNTER
----- Message from Judith Grimes, Patient Care Assistant sent at 5/11/2022  3:09 PM CDT -----  Type:  Needs Medical Advice    Who Called:  pt  Symptoms (please be specific):  Patient would like a call back to discuss some concern about how she is feeling    Would the patient rather a call back or a response via [employer]?  Please call  Best Call Back Number:  [phone]  Additional Information:

## 2022-05-12 NOTE — TELEPHONE ENCOUNTER
Returned call.  She was calling about an appointment.  Confirmed aleida for next week.  She needs to set up ride.  She is still having stomach pains. It is the same as when she left the hospital.  No further questions.

## 2022-05-15 ENCOUNTER — NURSE TRIAGE (OUTPATIENT)
Dept: ADMINISTRATIVE | Facility: CLINIC | Age: 72
End: 2022-05-15
Payer: MEDICARE

## 2022-05-15 ENCOUNTER — HOSPITAL ENCOUNTER (INPATIENT)
Facility: HOSPITAL | Age: 72
LOS: 4 days | Discharge: HOME HEALTH | DRG: 690 | End: 2022-05-20
Attending: EMERGENCY MEDICINE | Admitting: SURGERY
Payer: MEDICARE

## 2022-05-15 DIAGNOSIS — E44.0 MODERATE MALNUTRITION: ICD-10-CM

## 2022-05-15 DIAGNOSIS — Z86.19 HISTORY OF ESBL E. COLI INFECTION: Primary | ICD-10-CM

## 2022-05-15 DIAGNOSIS — N18.4 CHRONIC KIDNEY DISEASE, STAGE 4 (SEVERE): ICD-10-CM

## 2022-05-15 DIAGNOSIS — N20.0 BILATERAL NEPHROLITHIASIS: ICD-10-CM

## 2022-05-15 DIAGNOSIS — N10 ACUTE PYELONEPHRITIS: ICD-10-CM

## 2022-05-15 DIAGNOSIS — R10.31 RIGHT LOWER QUADRANT ABDOMINAL PAIN: ICD-10-CM

## 2022-05-15 DIAGNOSIS — K80.33 CALCULUS OF BILE DUCT WITH ACUTE CHOLANGITIS WITH OBSTRUCTION: ICD-10-CM

## 2022-05-15 DIAGNOSIS — Z16.24 MULTIPLE DRUG RESISTANT ORGANISM (MDRO) CULTURE POSITIVE: ICD-10-CM

## 2022-05-15 DIAGNOSIS — N39.0 URINARY TRACT INFECTION: ICD-10-CM

## 2022-05-15 DIAGNOSIS — N20.0 KIDNEY STONE: ICD-10-CM

## 2022-05-15 DIAGNOSIS — R10.11 RIGHT UPPER QUADRANT PAIN: ICD-10-CM

## 2022-05-15 DIAGNOSIS — C7A.012 MALIGNANT CARCINOID TUMOR OF ILEUM: ICD-10-CM

## 2022-05-15 LAB
ALBUMIN SERPL BCP-MCNC: 3.7 G/DL (ref 3.5–5.2)
ALP SERPL-CCNC: 128 U/L (ref 55–135)
ALT SERPL W/O P-5'-P-CCNC: 13 U/L (ref 10–44)
ANION GAP SERPL CALC-SCNC: 14 MMOL/L (ref 8–16)
AST SERPL-CCNC: 19 U/L (ref 10–40)
BACTERIA #/AREA URNS HPF: ABNORMAL /HPF
BASOPHILS # BLD AUTO: 0.02 K/UL (ref 0–0.2)
BASOPHILS NFR BLD: 0.4 % (ref 0–1.9)
BILIRUB SERPL-MCNC: 0.4 MG/DL (ref 0.1–1)
BILIRUB UR QL STRIP: NEGATIVE
BUN SERPL-MCNC: 13 MG/DL (ref 8–23)
CALCIUM SERPL-MCNC: 9.7 MG/DL (ref 8.7–10.5)
CHLORIDE SERPL-SCNC: 106 MMOL/L (ref 95–110)
CLARITY UR: CLEAR
CO2 SERPL-SCNC: 23 MMOL/L (ref 23–29)
COLOR UR: YELLOW
CREAT SERPL-MCNC: 0.9 MG/DL (ref 0.5–1.4)
DIFFERENTIAL METHOD: ABNORMAL
EOSINOPHIL # BLD AUTO: 0.1 K/UL (ref 0–0.5)
EOSINOPHIL NFR BLD: 1.3 % (ref 0–8)
ERYTHROCYTE [DISTWIDTH] IN BLOOD BY AUTOMATED COUNT: 14.7 % (ref 11.5–14.5)
EST. GFR  (AFRICAN AMERICAN): >60 ML/MIN/1.73 M^2
EST. GFR  (NON AFRICAN AMERICAN): >60 ML/MIN/1.73 M^2
GLUCOSE SERPL-MCNC: 101 MG/DL (ref 70–110)
GLUCOSE UR QL STRIP: NEGATIVE
HCT VFR BLD AUTO: 33.4 % (ref 37–48.5)
HGB BLD-MCNC: 10.2 G/DL (ref 12–16)
HGB UR QL STRIP: NEGATIVE
HYALINE CASTS #/AREA URNS LPF: 1 /LPF
IMM GRANULOCYTES # BLD AUTO: 0.01 K/UL (ref 0–0.04)
IMM GRANULOCYTES NFR BLD AUTO: 0.2 % (ref 0–0.5)
KETONES UR QL STRIP: NEGATIVE
LACTATE SERPL-SCNC: 1.5 MMOL/L (ref 0.5–2.2)
LEUKOCYTE ESTERASE UR QL STRIP: ABNORMAL
LIPASE SERPL-CCNC: 14 U/L (ref 4–60)
LYMPHOCYTES # BLD AUTO: 1.1 K/UL (ref 1–4.8)
LYMPHOCYTES NFR BLD: 23.3 % (ref 18–48)
MCH RBC QN AUTO: 26 PG (ref 27–31)
MCHC RBC AUTO-ENTMCNC: 30.5 G/DL (ref 32–36)
MCV RBC AUTO: 85 FL (ref 82–98)
MICROSCOPIC COMMENT: ABNORMAL
MONOCYTES # BLD AUTO: 0.4 K/UL (ref 0.3–1)
MONOCYTES NFR BLD: 7.8 % (ref 4–15)
NEUTROPHILS # BLD AUTO: 3 K/UL (ref 1.8–7.7)
NEUTROPHILS NFR BLD: 67 % (ref 38–73)
NITRITE UR QL STRIP: POSITIVE
NRBC BLD-RTO: 0 /100 WBC
PH UR STRIP: 6 [PH] (ref 5–8)
PLATELET # BLD AUTO: 212 K/UL (ref 150–450)
PMV BLD AUTO: 10.5 FL (ref 9.2–12.9)
POTASSIUM SERPL-SCNC: 3.5 MMOL/L (ref 3.5–5.1)
PROT SERPL-MCNC: 7.9 G/DL (ref 6–8.4)
PROT UR QL STRIP: NEGATIVE
RBC # BLD AUTO: 3.93 M/UL (ref 4–5.4)
RBC #/AREA URNS HPF: 5 /HPF (ref 0–4)
SODIUM SERPL-SCNC: 143 MMOL/L (ref 136–145)
SP GR UR STRIP: 1.01 (ref 1–1.03)
SQUAMOUS #/AREA URNS HPF: 1 /HPF
URN SPEC COLLECT METH UR: ABNORMAL
UROBILINOGEN UR STRIP-ACNC: NEGATIVE EU/DL
WBC # BLD AUTO: 4.5 K/UL (ref 3.9–12.7)
WBC #/AREA URNS HPF: 32 /HPF (ref 0–5)

## 2022-05-15 PROCEDURE — 85025 COMPLETE CBC W/AUTO DIFF WBC: CPT | Performed by: EMERGENCY MEDICINE

## 2022-05-15 PROCEDURE — 87186 SC STD MICRODIL/AGAR DIL: CPT | Performed by: EMERGENCY MEDICINE

## 2022-05-15 PROCEDURE — 87088 URINE BACTERIA CULTURE: CPT | Performed by: EMERGENCY MEDICINE

## 2022-05-15 PROCEDURE — 96374 THER/PROPH/DIAG INJ IV PUSH: CPT

## 2022-05-15 PROCEDURE — 25000003 PHARM REV CODE 250: Performed by: EMERGENCY MEDICINE

## 2022-05-15 PROCEDURE — 96375 TX/PRO/DX INJ NEW DRUG ADDON: CPT

## 2022-05-15 PROCEDURE — 83690 ASSAY OF LIPASE: CPT | Performed by: EMERGENCY MEDICINE

## 2022-05-15 PROCEDURE — 87077 CULTURE AEROBIC IDENTIFY: CPT | Performed by: EMERGENCY MEDICINE

## 2022-05-15 PROCEDURE — 87086 URINE CULTURE/COLONY COUNT: CPT | Performed by: EMERGENCY MEDICINE

## 2022-05-15 PROCEDURE — 83605 ASSAY OF LACTIC ACID: CPT | Performed by: EMERGENCY MEDICINE

## 2022-05-15 PROCEDURE — 96361 HYDRATE IV INFUSION ADD-ON: CPT

## 2022-05-15 PROCEDURE — 81000 URINALYSIS NONAUTO W/SCOPE: CPT | Performed by: EMERGENCY MEDICINE

## 2022-05-15 PROCEDURE — 99285 EMERGENCY DEPT VISIT HI MDM: CPT | Mod: 25

## 2022-05-15 PROCEDURE — 96376 TX/PRO/DX INJ SAME DRUG ADON: CPT

## 2022-05-15 PROCEDURE — 80053 COMPREHEN METABOLIC PANEL: CPT | Performed by: EMERGENCY MEDICINE

## 2022-05-15 PROCEDURE — 63600175 PHARM REV CODE 636 W HCPCS: Performed by: EMERGENCY MEDICINE

## 2022-05-15 RX ORDER — HYDROMORPHONE HYDROCHLORIDE 1 MG/ML
0.5 INJECTION, SOLUTION INTRAMUSCULAR; INTRAVENOUS; SUBCUTANEOUS
Status: COMPLETED | OUTPATIENT
Start: 2022-05-15 | End: 2022-05-15

## 2022-05-15 RX ORDER — SODIUM CHLORIDE 9 MG/ML
500 INJECTION, SOLUTION INTRAVENOUS
Status: COMPLETED | OUTPATIENT
Start: 2022-05-15 | End: 2022-05-15

## 2022-05-15 RX ADMIN — SODIUM CHLORIDE 500 ML: 0.9 INJECTION, SOLUTION INTRAVENOUS at 08:05

## 2022-05-15 RX ADMIN — HYDROMORPHONE HYDROCHLORIDE 0.5 MG: 1 INJECTION, SOLUTION INTRAMUSCULAR; INTRAVENOUS; SUBCUTANEOUS at 08:05

## 2022-05-15 RX ADMIN — HYDROMORPHONE HYDROCHLORIDE 0.5 MG: 1 INJECTION, SOLUTION INTRAMUSCULAR; INTRAVENOUS; SUBCUTANEOUS at 10:05

## 2022-05-15 RX ADMIN — CEFTRIAXONE 1 G: 1 INJECTION, SOLUTION INTRAVENOUS at 11:05

## 2022-05-15 NOTE — TELEPHONE ENCOUNTER
"Patient c/o severe abdominal pain (rated 9/10), severe diarrhea and increased weakness. Patient states she has a diagnosis of abdominal carcinoma and is undergoing chemotherapy.      Care Advice given per Abdominal Pain, Female (Adult) Guideline. Patient advised to call EMS/911 for transport due to living alone and weakness.     Reason for Disposition   [1] SEVERE pain AND [2] age > 60 years    Additional Information   Negative: Shock suspected (e.g., cold/pale/clammy skin, too weak to stand, low BP, rapid pulse)   Negative: Difficult to awaken or acting confused (e.g., disoriented, slurred speech)   Negative: Passed out (i.e., lost consciousness, collapsed and was not responding)   Negative: Sounds like a life-threatening emergency to the triager   Negative: Chest pain   Negative: Pain is mainly in upper abdomen  (if needed ask: "is it mainly above the belly button?")   Negative: Followed an abdomen (stomach) injury   Negative: [1] Abdominal pain AND [2] pregnant < 20 weeks   Negative: [1] Abdominal pain AND [2] pregnant 20 or more weeks   Negative: [1] Abdominal pain AND [2] postpartum (from 0 to 6 weeks after delivery)   Negative: [1] SEVERE pain (e.g., excruciating) AND [2] present > 1 hour    Protocols used: ABDOMINAL PAIN - FEMALE-A-AH      "

## 2022-05-16 ENCOUNTER — TELEPHONE (OUTPATIENT)
Dept: FAMILY MEDICINE | Facility: CLINIC | Age: 72
End: 2022-05-16
Payer: MEDICARE

## 2022-05-16 ENCOUNTER — PATIENT OUTREACH (OUTPATIENT)
Dept: ADMINISTRATIVE | Facility: OTHER | Age: 72
End: 2022-05-16
Payer: MEDICARE

## 2022-05-16 LAB
ALBUMIN SERPL BCP-MCNC: 3.2 G/DL (ref 3.5–5.2)
ALP SERPL-CCNC: 125 U/L (ref 55–135)
ALT SERPL W/O P-5'-P-CCNC: 14 U/L (ref 10–44)
ANION GAP SERPL CALC-SCNC: 5 MMOL/L (ref 8–16)
AST SERPL-CCNC: 14 U/L (ref 10–40)
BASOPHILS # BLD AUTO: 0.02 K/UL (ref 0–0.2)
BASOPHILS NFR BLD: 0.5 % (ref 0–1.9)
BILIRUB SERPL-MCNC: 0.6 MG/DL (ref 0.1–1)
BUN SERPL-MCNC: 10 MG/DL (ref 8–23)
CALCIUM SERPL-MCNC: 9 MG/DL (ref 8.7–10.5)
CHLORIDE SERPL-SCNC: 106 MMOL/L (ref 95–110)
CO2 SERPL-SCNC: 28 MMOL/L (ref 23–29)
CREAT SERPL-MCNC: 0.8 MG/DL (ref 0.5–1.4)
DIFFERENTIAL METHOD: ABNORMAL
EOSINOPHIL # BLD AUTO: 0.1 K/UL (ref 0–0.5)
EOSINOPHIL NFR BLD: 1.4 % (ref 0–8)
ERYTHROCYTE [DISTWIDTH] IN BLOOD BY AUTOMATED COUNT: 15.1 % (ref 11.5–14.5)
EST. GFR  (AFRICAN AMERICAN): >60 ML/MIN/1.73 M^2
EST. GFR  (NON AFRICAN AMERICAN): >60 ML/MIN/1.73 M^2
GLUCOSE SERPL-MCNC: 118 MG/DL (ref 70–110)
HCT VFR BLD AUTO: 32.4 % (ref 37–48.5)
HGB BLD-MCNC: 10.1 G/DL (ref 12–16)
IMM GRANULOCYTES # BLD AUTO: 0.02 K/UL (ref 0–0.04)
IMM GRANULOCYTES NFR BLD AUTO: 0.5 % (ref 0–0.5)
LYMPHOCYTES # BLD AUTO: 1 K/UL (ref 1–4.8)
LYMPHOCYTES NFR BLD: 24.1 % (ref 18–48)
MAGNESIUM SERPL-MCNC: 1.5 MG/DL (ref 1.6–2.6)
MCH RBC QN AUTO: 26.3 PG (ref 27–31)
MCHC RBC AUTO-ENTMCNC: 31.2 G/DL (ref 32–36)
MCV RBC AUTO: 84 FL (ref 82–98)
MONOCYTES # BLD AUTO: 0.4 K/UL (ref 0.3–1)
MONOCYTES NFR BLD: 10.1 % (ref 4–15)
NEUTROPHILS # BLD AUTO: 2.6 K/UL (ref 1.8–7.7)
NEUTROPHILS NFR BLD: 63.4 % (ref 38–73)
NRBC BLD-RTO: 0 /100 WBC
PHOSPHATE SERPL-MCNC: 3 MG/DL (ref 2.7–4.5)
PLATELET # BLD AUTO: 199 K/UL (ref 150–450)
PMV BLD AUTO: 10.6 FL (ref 9.2–12.9)
POTASSIUM SERPL-SCNC: 3.6 MMOL/L (ref 3.5–5.1)
PROT SERPL-MCNC: 6.9 G/DL (ref 6–8.4)
RBC # BLD AUTO: 3.84 M/UL (ref 4–5.4)
SODIUM SERPL-SCNC: 139 MMOL/L (ref 136–145)
WBC # BLD AUTO: 4.15 K/UL (ref 3.9–12.7)

## 2022-05-16 PROCEDURE — 83735 ASSAY OF MAGNESIUM: CPT | Performed by: STUDENT IN AN ORGANIZED HEALTH CARE EDUCATION/TRAINING PROGRAM

## 2022-05-16 PROCEDURE — 94761 N-INVAS EAR/PLS OXIMETRY MLT: CPT

## 2022-05-16 PROCEDURE — 80053 COMPREHEN METABOLIC PANEL: CPT | Performed by: STUDENT IN AN ORGANIZED HEALTH CARE EDUCATION/TRAINING PROGRAM

## 2022-05-16 PROCEDURE — 85025 COMPLETE CBC W/AUTO DIFF WBC: CPT | Performed by: STUDENT IN AN ORGANIZED HEALTH CARE EDUCATION/TRAINING PROGRAM

## 2022-05-16 PROCEDURE — 84100 ASSAY OF PHOSPHORUS: CPT | Performed by: STUDENT IN AN ORGANIZED HEALTH CARE EDUCATION/TRAINING PROGRAM

## 2022-05-16 PROCEDURE — 25000003 PHARM REV CODE 250: Performed by: STUDENT IN AN ORGANIZED HEALTH CARE EDUCATION/TRAINING PROGRAM

## 2022-05-16 PROCEDURE — 11000001 HC ACUTE MED/SURG PRIVATE ROOM

## 2022-05-16 PROCEDURE — 94760 N-INVAS EAR/PLS OXIMETRY 1: CPT

## 2022-05-16 PROCEDURE — 63600175 PHARM REV CODE 636 W HCPCS: Performed by: STUDENT IN AN ORGANIZED HEALTH CARE EDUCATION/TRAINING PROGRAM

## 2022-05-16 RX ORDER — DEXTROSE MONOHYDRATE, SODIUM CHLORIDE, AND POTASSIUM CHLORIDE 50; 1.49; 4.5 G/1000ML; G/1000ML; G/1000ML
INJECTION, SOLUTION INTRAVENOUS CONTINUOUS
Status: DISCONTINUED | OUTPATIENT
Start: 2022-05-16 | End: 2022-05-17

## 2022-05-16 RX ORDER — ACETAMINOPHEN 325 MG/1
650 TABLET ORAL EVERY 8 HOURS PRN
Status: DISCONTINUED | OUTPATIENT
Start: 2022-05-16 | End: 2022-05-18

## 2022-05-16 RX ORDER — ACETAMINOPHEN 325 MG/1
650 TABLET ORAL EVERY 4 HOURS PRN
Status: DISCONTINUED | OUTPATIENT
Start: 2022-05-16 | End: 2022-05-18

## 2022-05-16 RX ORDER — DIPHENOXYLATE HYDROCHLORIDE AND ATROPINE SULFATE 2.5; .025 MG/1; MG/1
1 TABLET ORAL 4 TIMES DAILY PRN
Status: DISCONTINUED | OUTPATIENT
Start: 2022-05-16 | End: 2022-05-20 | Stop reason: HOSPADM

## 2022-05-16 RX ORDER — LIDOCAINE 50 MG/G
1 PATCH TOPICAL DAILY
Status: CANCELLED | OUTPATIENT
Start: 2022-05-16

## 2022-05-16 RX ORDER — METOPROLOL TARTRATE 25 MG/1
25 TABLET, FILM COATED ORAL 2 TIMES DAILY
Status: DISCONTINUED | OUTPATIENT
Start: 2022-05-16 | End: 2022-05-16

## 2022-05-16 RX ORDER — LOSARTAN POTASSIUM 50 MG/1
100 TABLET ORAL DAILY
Status: DISCONTINUED | OUTPATIENT
Start: 2022-05-16 | End: 2022-05-20 | Stop reason: HOSPADM

## 2022-05-16 RX ORDER — OXYCODONE HYDROCHLORIDE 5 MG/1
10 TABLET ORAL EVERY 4 HOURS PRN
Status: DISCONTINUED | OUTPATIENT
Start: 2022-05-16 | End: 2022-05-17

## 2022-05-16 RX ORDER — TALC
6 POWDER (GRAM) TOPICAL NIGHTLY PRN
Status: DISCONTINUED | OUTPATIENT
Start: 2022-05-16 | End: 2022-05-20 | Stop reason: HOSPADM

## 2022-05-16 RX ORDER — METOPROLOL TARTRATE 25 MG/1
25 TABLET, FILM COATED ORAL 2 TIMES DAILY
Status: DISCONTINUED | OUTPATIENT
Start: 2022-05-16 | End: 2022-05-20 | Stop reason: HOSPADM

## 2022-05-16 RX ORDER — ONDANSETRON 8 MG/1
8 TABLET, ORALLY DISINTEGRATING ORAL EVERY 8 HOURS PRN
Status: DISCONTINUED | OUTPATIENT
Start: 2022-05-16 | End: 2022-05-16

## 2022-05-16 RX ORDER — AMLODIPINE BESYLATE 5 MG/1
5 TABLET ORAL DAILY
Status: DISCONTINUED | OUTPATIENT
Start: 2022-05-16 | End: 2022-05-20 | Stop reason: HOSPADM

## 2022-05-16 RX ORDER — LIDOCAINE HYDROCHLORIDE 10 MG/ML
1 INJECTION, SOLUTION EPIDURAL; INFILTRATION; INTRACAUDAL; PERINEURAL ONCE
Status: DISCONTINUED | OUTPATIENT
Start: 2022-05-16 | End: 2022-05-18

## 2022-05-16 RX ORDER — OXYCODONE HYDROCHLORIDE 5 MG/1
5 TABLET ORAL EVERY 4 HOURS PRN
Status: DISCONTINUED | OUTPATIENT
Start: 2022-05-16 | End: 2022-05-18

## 2022-05-16 RX ORDER — ATORVASTATIN CALCIUM 40 MG/1
40 TABLET, FILM COATED ORAL NIGHTLY
Status: DISCONTINUED | OUTPATIENT
Start: 2022-05-16 | End: 2022-05-20 | Stop reason: HOSPADM

## 2022-05-16 RX ORDER — SODIUM CHLORIDE 0.9 % (FLUSH) 0.9 %
10 SYRINGE (ML) INJECTION
Status: DISCONTINUED | OUTPATIENT
Start: 2022-05-16 | End: 2022-05-20 | Stop reason: HOSPADM

## 2022-05-16 RX ORDER — HYDRALAZINE HYDROCHLORIDE 20 MG/ML
10 INJECTION INTRAMUSCULAR; INTRAVENOUS EVERY 6 HOURS PRN
Status: DISCONTINUED | OUTPATIENT
Start: 2022-05-16 | End: 2022-05-20 | Stop reason: HOSPADM

## 2022-05-16 RX ADMIN — MEROPENEM 1 G: 1 INJECTION, POWDER, FOR SOLUTION INTRAVENOUS at 04:05

## 2022-05-16 RX ADMIN — OXYCODONE 10 MG: 5 TABLET ORAL at 09:05

## 2022-05-16 RX ADMIN — METOPROLOL TARTRATE 25 MG: 25 TABLET, FILM COATED ORAL at 01:05

## 2022-05-16 RX ADMIN — AMLODIPINE BESYLATE 5 MG: 5 TABLET ORAL at 09:05

## 2022-05-16 RX ADMIN — MEROPENEM 1 G: 1 INJECTION, POWDER, FOR SOLUTION INTRAVENOUS at 09:05

## 2022-05-16 RX ADMIN — Medication 6 MG: at 01:05

## 2022-05-16 RX ADMIN — OXYCODONE 10 MG: 5 TABLET ORAL at 07:05

## 2022-05-16 RX ADMIN — ATORVASTATIN CALCIUM 40 MG: 40 TABLET, FILM COATED ORAL at 01:05

## 2022-05-16 RX ADMIN — DEXTROSE, SODIUM CHLORIDE, AND POTASSIUM CHLORIDE: 5; .45; .15 INJECTION INTRAVENOUS at 01:05

## 2022-05-16 RX ADMIN — METOPROLOL TARTRATE 25 MG: 25 TABLET, FILM COATED ORAL at 08:05

## 2022-05-16 RX ADMIN — MEROPENEM 1 G: 1 INJECTION, POWDER, FOR SOLUTION INTRAVENOUS at 01:05

## 2022-05-16 RX ADMIN — OXYCODONE 10 MG: 5 TABLET ORAL at 02:05

## 2022-05-16 RX ADMIN — DIPHENOXYLATE HYDROCHLORIDE AND ATROPINE SULFATE 1 TABLET: 2.5; .025 TABLET ORAL at 07:05

## 2022-05-16 RX ADMIN — OXYCODONE 10 MG: 5 TABLET ORAL at 01:05

## 2022-05-16 RX ADMIN — METOPROLOL TARTRATE 25 MG: 25 TABLET, FILM COATED ORAL at 09:05

## 2022-05-16 RX ADMIN — ATORVASTATIN CALCIUM 40 MG: 40 TABLET, FILM COATED ORAL at 08:05

## 2022-05-16 RX ADMIN — LOSARTAN POTASSIUM 100 MG: 50 TABLET, FILM COATED ORAL at 09:05

## 2022-05-16 RX ADMIN — Medication 6 MG: at 07:05

## 2022-05-16 RX ADMIN — DEXTROSE, SODIUM CHLORIDE, AND POTASSIUM CHLORIDE: 5; .45; .15 INJECTION INTRAVENOUS at 07:05

## 2022-05-16 RX ADMIN — OXYCODONE 10 MG: 5 TABLET ORAL at 05:05

## 2022-05-16 RX ADMIN — DIPHENOXYLATE HYDROCHLORIDE AND ATROPINE SULFATE 1 TABLET: 2.5; .025 TABLET ORAL at 04:05

## 2022-05-16 RX ADMIN — HYDRALAZINE HYDROCHLORIDE 10 MG: 20 INJECTION, SOLUTION INTRAMUSCULAR; INTRAVENOUS at 04:05

## 2022-05-16 NOTE — ED PROVIDER NOTES
"Encounter Date: 5/15/2022       History     Chief Complaint   Patient presents with    Abdominal Pain     Patient presents to the ED via [facility] from home with reports of having RLQ abdominal pain and "hot flashes". 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      Patient is a 71F PMHx  metastatic neuroendocrine tumor status post cytoreduction 2021, recent admission for biliary obstruction secondary to choledocholithiasis status post ERCP and sphincterotomy who presents to the ED with the complaint of abdominal pain. Pt reports RUQ and RLQ abdominal pain that started last night. She describes the pain as "pressure" that she states is exacerbated with walking and present at rest.  She also reports "hot flashes" in her face which have resolved.  She reports multiple episodes of non bloody diarrhea. Pt states she took her last oxycodone for her pill at approximately 2pm which temporarily helped with her pain. Pt denies any vomiting. She denies any chest pain or fever. She denies any  complaints. She denies any suspicious food intake or recent travel. Pt is followed by [facility].         Review of patient's allergies indicates:   Allergen Reactions    Contrast media Hives, Itching and Swelling    Epinephrine Anaphylaxis     Can cause  a Carcinoid Crisis    Ibuprofen Hives, Itching and Swelling    Zofran [ondansetron hcl] Itching     And multiple other reactions    Iodinated contrast media     Morphine Other (See Comments)    Sulfa (sulfonamide antibiotics) Hives, Itching and Swelling     Past Medical History:   Diagnosis Date    Allergy     Arthritis     Cataract     Colon cancer     Encounter for blood transfusion     HTN (hypertension)     Kidney stones 2014    Left pontine CVA 07/03/2021    Liver disease     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     colon    Pyelonephritis, acute     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)      Past " Surgical History:   Procedure Laterality Date    ABDOMINAL SURGERY      CATARACT EXTRACTION Left 10/2017     SECTION      CHOLECYSTECTOMY      COLON SURGERY      cystoscope      CYSTOSCOPY W/ RETROGRADES Right 10/10/2019    Procedure: CYSTOSCOPY, WITH RETROGRADE PYELOGRAM;  Surgeon: Gen Isbell MD;  Location: [facility];  Service: Urology;  Laterality: Right;    ERCP N/A 2021    Procedure: ERCP (ENDOSCOPIC RETROGRADE CHOLANGIOPANCREATOGRAPHY);  Surgeon: Jayjay Rivera MD;  Location: [address]);  Service: Endoscopy;  Laterality: N/A;    ERCP N/A 3/4/2022    Procedure: ERCP (ENDOSCOPIC RETROGRADE CHOLANGIOPANCREATOGRAPHY);  Surgeon: Roby Virgen MD;  Location: [facility] ([address] [place] [address]);  Service: Endoscopy;  Laterality: N/A;    EYE SURGERY      HYSTERECTOMY  1996    LITHOTRIPSY      LIVER BIOPSY      carcinoid    URETEROSCOPY Right 10/10/2019    Procedure: URETEROSCOPY;  Surgeon: Gen Isbell MD;  Location: [facility];  Service: Urology;  Laterality: Right;    UTERINE FIBROID SURGERY       Family History   Problem Relation Age of Onset    Cancer Mother         unknown    Alzheimer's disease Father     Stroke Sister     No Known Problems Son     No Known Problems Son     No Known Problems Son     No Known Problems Son     Kidney disease Neg Hx      Social History     Tobacco Use    Smoking status: Never Smoker    Smokeless tobacco: Never Used   Substance Use Topics    Alcohol use: No     Alcohol/week: 0.0 standard drinks    Drug use: No     Review of Systems   Constitutional: Negative for chills and fever.   Respiratory: Negative for chest tightness and shortness of breath.    Cardiovascular: Negative for chest pain and palpitations.   Gastrointestinal: Positive for abdominal pain, diarrhea and nausea. Negative for constipation and vomiting.   Genitourinary: Negative for dysuria, flank pain and urgency.   Musculoskeletal: Negative for back pain and myalgias.    Neurological: Negative for dizziness and headaches.   Psychiatric/Behavioral: Negative for agitation and confusion.       Physical Exam     Initial Vitals [05/15/22 1936]   BP Pulse Resp Temp SpO2   (!) 170/75 84 16 98.3 °F (36.8 °C) 96 %      MAP       --         Physical Exam    Nursing note and vitals reviewed.  Constitutional: She appears well-developed and well-nourished. She appears distressed.   HENT:   Head: Normocephalic and atraumatic.   Right Ear: External ear normal.   Left Ear: External ear normal.   Eyes: Conjunctivae and EOM are normal. Pupils are equal, round, and reactive to light.   Neck: Neck supple.   Normal range of motion.  Cardiovascular: Normal rate, regular rhythm, normal heart sounds and intact distal pulses.   Pulmonary/Chest: Breath sounds normal.   Abdominal: Abdomen is soft. Bowel sounds are normal.   TTP RLQ; no rebound; no guarding; no peritoneal signs; well-healed abdominal surgical scars noted    Musculoskeletal:      Cervical back: Normal range of motion and neck supple.     Neurological: She is alert and oriented to person, place, and time. She has normal strength. GCS score is 15. GCS eye subscore is 4. GCS verbal subscore is 5. GCS motor subscore is 6.   Skin: Skin is warm. Capillary refill takes less than 2 seconds.   Psychiatric: She has a normal mood and affect.         ED Course   Procedures  Labs Reviewed   CBC W/ AUTO DIFFERENTIAL - Abnormal; Notable for the following components:       Result Value    RBC 3.93 (*)     Hemoglobin 10.2 (*)     Hematocrit 33.4 (*)     MCH 26.0 (*)     MCHC 30.5 (*)     RDW 14.7 (*)     All other components within normal limits   URINALYSIS, REFLEX TO URINE CULTURE - Abnormal; Notable for the following components:    Nitrite, UA Positive (*)     Leukocytes, UA 2+ (*)     All other components within normal limits    Narrative:     Specimen Source->Urine   URINALYSIS MICROSCOPIC - Abnormal; Notable for the following components:    RBC, UA 5  (*)     WBC, UA 32 (*)     All other components within normal limits    Narrative:     Specimen Source->Urine   CULTURE, URINE   COMPREHENSIVE METABOLIC PANEL   LIPASE   LACTIC ACID, PLASMA          Imaging Results          CT Abdomen Pelvis  Without Contrast (Final result)  Result time 05/15/22 22:07:59    Final result by Romeo Carrizales MD (05/15/22 22:07:59)                 Impression:      Stable CT of the abdomen and pelvis since April 30, 2022 exam with no acute findings to explain right lower quadrant pain.    Multiple chronic findings, stable as described above.      Electronically signed by: Romeo Carrizales  Date:    05/15/2022  Time:    22:07             Narrative:    EXAMINATION:  CT ABDOMEN PELVIS WITHOUT CONTRAST    CLINICAL HISTORY:  Abdominal abscess/infection suspected;Bowel obstruction suspected;    TECHNIQUE:  Low dose axial images, sagittal and coronal reformations were obtained from the lung bases to the pubic symphysis.  Oral contrast was not administered.    COMPARISON:  None    CT abdomen and pelvis, 04/30/2022    FINDINGS:  Heart: Stable in size. No pericardial effusion.    Lung Bases: Well aerated, without consolidation or pleural fluid.    Liver: Stable in size and attenuation, with no focal hepatic lesions.  Continued new pneumobilia and granulomatous calcifications    Gallbladder: Resected    Bile Ducts: Pneumobilia, stable    Pancreas: No mass or peripancreatic fat stranding.    Spleen: Unremarkable.    Adrenals: Unremarkable.    Kidneys/ Ureters: Stable multiple calculi primarily in the lower poles with partial staghorn on the right.  No change.  No hydronephrosis.  No evidence of ureteral calculus    Bladder: No evidence of wall thickening.    Reproductive organs: Removed    GI Tract/Mesentery: No evidence of bowel obstruction or inflammation.  Postoperative changes with MELVIN staples are noted.    Peritoneal Space: No ascites. No free air.  5 cm calcified mass in the anterior right  abdomen may represent patient has residual carcinoid focus with calcification.    Retroperitoneum: No significant adenopathy.    Abdominal wall: Unremarkable.    Vasculature: No significant atherosclerosis or aneurysm.    Bones: No acute fracture.                                 Medications   LIDOcaine (PF) 10 mg/ml (1%) injection 10 mg (has no administration in time range)   sodium chloride 0.9% flush 10 mL (has no administration in time range)   ondansetron disintegrating tablet 8 mg (has no administration in time range)   melatonin tablet 6 mg (has no administration in time range)   acetaminophen tablet 650 mg (has no administration in time range)   acetaminophen tablet 650 mg (has no administration in time range)   oxyCODONE immediate release tablet 5 mg (has no administration in time range)   oxyCODONE immediate release tablet 10 mg (has no administration in time range)   meropenem 1 g in sodium chloride 0.9 % 100 mL IVPB (ready to mix system) (has no administration in time range)   dextrose 5 % and 0.45 % NaCl with KCl 20 mEq infusion (has no administration in time range)   HYDROmorphone injection 0.5 mg (0.5 mg Intravenous Given 5/15/22 2038)   0.9%  NaCl infusion (500 mLs Intravenous New Bag 5/15/22 2038)   HYDROmorphone injection 0.5 mg (0.5 mg Intravenous Given 5/15/22 2254)   cefTRIAXone (ROCEPHIN) 1 g/50 mL D5W IVPB (1 g Intravenous New Bag 5/15/22 2320)     Medical Decision Making:   Clinical Tests:   Lab Tests: Ordered and Reviewed  Radiological Study: Ordered and Reviewed  ED Management:  - VSS; pt afebrile  - UA nitrite positive; many WBCs; ceftriaxone administered  - CBC w/diff H/H stable; no significant leukocytosis  - CMP without significant electrolyte abnormality; renal function WNL   - Lipase WNL  - Lactic acid WNL  - CT A/P WO contrast demonstrates no acute intra-abdominal abnormality per final radiology read  - pt's pain not well-controlled in the ED  - [facility] gen sx consulted  - [facility] gen sx will  admit patient               ED Course as of 05/16/22 0017   Sun May 15, 2022   8171 Case discussed with on-call [facility] general surgery resident, Dr. Glynn, who stated she would come evaluate the patient in the ED.  [LC]      ED Course User Index  [LC] John Alexandra MD             Clinical Impression:   Final diagnoses:  [N39.0] Urinary tract infection  [R10.31] Right lower quadrant abdominal pain  [R10.11] Right upper quadrant pain          ED Disposition Condition    Admit               John Alexandra MD  05/16/22 0017

## 2022-05-16 NOTE — ED NOTES
Patient needing to void. On and off the bedpan, unable to void. Wanting to stand and hold bedpan under her. Patient stood with assistance, steady on her feet, voided in the bedpan.

## 2022-05-16 NOTE — PLAN OF CARE
Sw met with pt to discuss d/c plans. Pt lives alone. Pt's biggest support person is her son Ryan [phone]. Pt has the following DME: rollator, bsc, bath bench, and wc. Pt stated she lives on the first floor and has hired someone to come to her once a week to help with cleaning and any other needs she may have. Pt stated she takes the health van to get to doctor appointments. Pt stated she may need transportation home at the time of d/c. Sw encouraged pt to call with any questions or concerns. Sw will continue to follow pt for d/c planning.     Future Appointments   Date Time Provider Department Center   5/19/2022 10:20 AM Chris Herbert MD [facility] T[facility]OR [facility]   6/1/2022  9:30 AM Ervin Parikh MD [facility] UROLOGY [facility] [facility]         05/16/22 1415   Discharge Assessment   Assessment Type Discharge Planning Assessment   Confirmed/corrected address, phone number and insurance Yes   Confirmed Demographics Correct on Facesheet   Source of Information patient   Lives With alone   Facility Arrived From: home   Do you expect to return to your current living situation? Yes   Do you have help at home or someone to help you manage your care at home? Yes   Who are your caregiver(s) and their phone number(s)? pt's son Ryan [phone]   Prior to hospitilization cognitive status: Alert/Oriented   Current cognitive status: Alert/Oriented   Walking or Climbing Stairs Difficulty ambulation difficulty, requires equipment   Dressing/Bathing Difficulty bathing difficulty, requires equipment   Home Layout Able to live on 1st floor   Equipment Currently Used at Home rollator;bedside commode;bath bench;wheelchair   Readmission within 30 days? No   Patient currently being followed by outpatient case management? No   Do you currently have service(s) that help you manage your care at home? No   Do you have prescription coverage? Yes   Coverage [employer] Medicare   Do you have any problems affording any of your  prescribed medications? No   Who is going to help you get home at discharge? pt's sons   How do you get to doctors appointments? health plan transportation   Discharge Plan A Home with family   Discharge Plan B Home Health   DME Needed Upon Discharge    (TBD)   Discharge Plan discussed with: Patient   Discharge Barriers Identified None

## 2022-05-16 NOTE — NURSING
Patient AAOx4. Arrived to 5a. Oriented to the room. C/o 10/10 pain, PRN pain medication administered per orders. BP in the 180s, Dr. Glynn notified. New orders placed. Cont IV fluids started. NPO, Patient verbalized understanding. Tele, vs and labs monitored. Instructed to call for assistance. Safety maintained. Will continue to monitor.

## 2022-05-16 NOTE — TELEPHONE ENCOUNTER
----- Message from Myriam Black sent at 5/16/2022  3:48 PM CDT -----  Contact: [phone]/MARK CARPENTER  Who Called: PT  Regarding: prescription request amLODIPine tablet 5 mg  Would the patient rather a call back or a response via [facility]? Call back  Best Call Back Number: [phone]  Additional Information: fax [phone]

## 2022-05-16 NOTE — H&P
"Neuroendocrine Surgery/General Surgery  History and Physical    SUBJECTIVE:     Chief Complaint:   Per triage note--Abdominal Pain (Patient presents to the ED via [facility] from home with reports of having RLQ abdominal pain and "hot flashes". 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 )      History of Present Illness:  Ms. Domingo is a 71 y.o. 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 She has had this pain since her procedure in 3/4/2022 w/ multiple visits to the ED and recent admission earlier this month. Also notes diarrhea that improved with imodium, nonbloody. Denies dysuria, frequency or urgency. +chills/sweats but no fevers measured. +nausea, no vomiting.    Labs WNL, no leukocytosis, no elevated Tbili or liver enzymes.   UA +for nitrites, WBC and RBCs. Got dose of rocephin in ED.     CT w/o contrast due to shortage shows no changes since last CT in April. She does have persistent pneumobilia presumed from ERCP. Also w/ known renal calculi that are nonobstructing.     Allergies:  Review of patient's allergies indicates:   Allergen Reactions    Contrast media Hives, Itching and Swelling    Epinephrine Anaphylaxis     Can cause  a Carcinoid Crisis    Ibuprofen Hives, Itching and Swelling    Zofran [ondansetron hcl] Itching     And multiple other reactions    Iodinated contrast media     Morphine Other (See Comments)    Sulfa (sulfonamide antibiotics) Hives, Itching and Swelling       Home Medications:  No current facility-administered medications on file prior to encounter.     Current Outpatient Medications on File Prior to Encounter   Medication Sig    alcohol swabs (BD ALCOHOL SWABS) PadM Apply 1 each topically as needed.    amLODIPine (NORVASC) 5 MG tablet Take 1 tablet (5 mg total) by " mouth once daily.    atorvastatin (LIPITOR) 40 MG tablet Take 1 tablet (40 mg total) by mouth every evening.    BLOOD PRESSURE CUFF Misc 1 each by Misc.(Non-Drug; Combo Route) route once daily.    cyanocobalamin (VITAMIN B-12) 1000 MCG tablet Take 1 tablet (1,000 mcg total) by mouth once daily.    diclofenac sodium (VOLTAREN) 1 % Gel Apply 2 g topically 4 (four) times daily as needed (area of pain).    diphenoxylate-atropine 2.5-0.025 mg (LOMOTIL) 2.5-0.025 mg per tablet TAKE 1 TABLET BY MOUTH FOUR TIMES DAILY AS NEEDED    LIDOcaine (LIDODERM) 5 % Place 1 patch onto the skin once daily.    losartan (COZAAR) 100 MG tablet TAKE 1 TABLET (100 MG TOTAL) BY MOUTH ONCE DAILY.    magnesium oxide (MAG-OX) 400 mg (241.3 mg magnesium) tablet Take 1 tablet (400 mg total) by mouth 2 (two) times daily.    meclizine (ANTIVERT) 25 mg tablet TAKE 1 TABLET(25 MG) BY MOUTH THREE TIMES DAILY AS NEEDED FOR DIZZINESS (Patient taking differently: Take 25 mg by mouth 3 (three) times daily as needed for Dizziness.)    metoprolol tartrate (LOPRESSOR) 25 MG tablet TAKE 1 TABLET TWICE DAILY    oxyCODONE (ROXICODONE) 15 MG Tab Take 15 mg by mouth every 4 (four) hours as needed (chronic abdominal pain, malignancy related).       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)      Past Surgical History:   Procedure Laterality Date    ABDOMINAL SURGERY      CATARACT EXTRACTION Left 10/2017     SECTION      CHOLECYSTECTOMY      COLON SURGERY      cystoscope      CYSTOSCOPY W/ RETROGRADES Right 10/10/2019    Procedure: CYSTOSCOPY, WITH RETROGRADE PYELOGRAM;  Surgeon: Gen Isbell MD;  Location:  [facility];  Service: Urology;  Laterality: Right;    ERCP N/A 11/24/2021    Procedure: ERCP (ENDOSCOPIC RETROGRADE CHOLANGIOPANCREATOGRAPHY);  Surgeon: Jayjay Rivera MD;  Location: [facility] ENDO (2ND FLR);  Service: Endoscopy;  Laterality: N/A;    ERCP N/A 3/4/2022    Procedure: ERCP (ENDOSCOPIC RETROGRADE CHOLANGIOPANCREATOGRAPHY);  Surgeon: Roby Virgen MD;  [facility]  Service: Endoscopy;  Laterality: N/A;    EYE SURGERY      HYSTERECTOMY  5/1996    LITHOTRIPSY      LIVER BIOPSY  9/14    carcinoid    URETEROSCOPY Right 10/10/2019    Procedure: URETEROSCOPY;  Surgeon: Gen Isbell MD;  Location: [facility] OR;  Service: Urology;  Laterality: Right;    UTERINE FIBROID SURGERY       Family History   Problem Relation Age of Onset    Cancer Mother         unknown    Alzheimer's disease Father     Stroke Sister     No Known Problems Son     No Known Problems Son     No Known Problems Son     No Known Problems Son     Kidney disease Neg Hx      Social History     Tobacco Use    Smoking status: Never Smoker    Smokeless tobacco: Never Used   Substance Use Topics    Alcohol use: No     Alcohol/week: 0.0 standard drinks    Drug use: No        Review of Systems:  All 10 ROS negative except that which is indicated in the HPI    OBJECTIVE:     Vital Signs:  Temp: 98.3 °F (36.8 °C) (05/15/22 2200)  Pulse: 85 (05/15/22 2302)  Resp: 18 (05/15/22 2302)  BP: (!) 188/78 (05/15/22 2302)  SpO2: 100 % (05/15/22 2302)    Physical Exam:  General: well developed, well nourished, no distress  HEENT: NCAT, EOMI  Neck: supple, symmetrical  Lungs: Normal WOB, No SOB  Cardiovascular: regular rate  Abdomen: soft, nondistended, TTP RUQ and RLQ, nonperitoneal, no rebound tenderness   Skin: Skin color, texture, turgor normal. No rashes or lesions  Musculoskeletal:no clubbing, cyanosis, no deformities  Neurologic: No focal numbness or weakness  Psych/Behavioral:  Alert and oriented, appropriate  affect.    Laboratory:  Labs Reviewed   CBC W/ AUTO DIFFERENTIAL - Abnormal; Notable for the following components:       Result Value    RBC 3.93 (*)     Hemoglobin 10.2 (*)     Hematocrit 33.4 (*)     MCH 26.0 (*)     MCHC 30.5 (*)     RDW 14.7 (*)     All other components within normal limits   URINALYSIS, REFLEX TO URINE CULTURE - Abnormal; Notable for the following components:    Nitrite, UA Positive (*)     Leukocytes, UA 2+ (*)     All other components within normal limits    Narrative:     Specimen Source->Urine   URINALYSIS MICROSCOPIC - Abnormal; Notable for the following components:    RBC, UA 5 (*)     WBC, UA 32 (*)     All other components within normal limits    Narrative:     Specimen Source->Urine   CULTURE, URINE   COMPREHENSIVE METABOLIC PANEL   LIPASE   LACTIC ACID, PLASMA         Diagnostic Results:  Imaging Results          CT Abdomen Pelvis  Without Contrast (Final result)  Result time 05/15/22 22:07:59    Final result by Romeo Carrizales MD (05/15/22 22:07:59)                 Impression:      Stable CT of the abdomen and pelvis since April 30, 2022 exam with no acute findings to explain right lower quadrant pain.    Multiple chronic findings, stable as described above.      Electronically signed by: Romeo Carrizales  Date:    05/15/2022  Time:    22:07             Narrative:    EXAMINATION:  CT ABDOMEN PELVIS WITHOUT CONTRAST    CLINICAL HISTORY:  Abdominal abscess/infection suspected;Bowel obstruction suspected;    TECHNIQUE:  Low dose axial images, sagittal and coronal reformations were obtained from the lung bases to the pubic symphysis.  Oral contrast was not administered.    COMPARISON:  None    CT abdomen and pelvis, 04/30/2022    FINDINGS:  Heart: Stable in size. No pericardial effusion.    Lung Bases: Well aerated, without consolidation or pleural fluid.    Liver: Stable in size and attenuation, with no focal hepatic lesions.  Continued new pneumobilia and granulomatous  calcifications    Gallbladder: Resected    Bile Ducts: Pneumobilia, stable    Pancreas: No mass or peripancreatic fat stranding.    Spleen: Unremarkable.    Adrenals: Unremarkable.    Kidneys/ Ureters: Stable multiple calculi primarily in the lower poles with partial staghorn on the right.  No change.  No hydronephrosis.  No evidence of ureteral calculus    Bladder: No evidence of wall thickening.    Reproductive organs: Removed    GI Tract/Mesentery: No evidence of bowel obstruction or inflammation.  Postoperative changes with MELVIN staples are noted.    Peritoneal Space: No ascites. No free air.  5 cm calcified mass in the anterior right abdomen may represent patient has residual carcinoid focus with calcification.    Retroperitoneum: No significant adenopathy.    Abdominal wall: Unremarkable.    Vasculature: No significant atherosclerosis or aneurysm.    Bones: No acute fracture.                                 ASSESSMENT:     Ms. Domingo is a 71 y.o. 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

## 2022-05-16 NOTE — PROGRESS NOTES
IP Liaison - Initial Visit Note    Patient: Patito Domingo  MRN:  [id number]  Date of Service:  5/16/2022  Completed by:  MADHURI Matias    Reason for Visit   Patient presents with    IP Liaison Initial Visit       RSW met with patient at bedside in order to complete SDOH questionnaire and liaison assessment.  Pt has identified barriers to care of financial resource strain in relation to rent payments.  RSW has provided patient with [facility] resources to address identified barriers.    The following were addressed during this visit:  - Review SDOH Questions   - Complete patient assessment   - Complete initial visit with patient        Patient Summary     IP Liaison Patient Assessment    General  Level of Caregiver support: Member independent and does not need caregiver assistance  Have you had to make a decision between paying for any of the following in the last 2 months?: None  Transportation means: Para transit service  Employment status: Retired and not working  Assessments  Was the PHQ Depression Screening completed this visit?: No  Was the MARGARITA-7 Screening completed this visit?: No         MADHURI Matias

## 2022-05-16 NOTE — ED TRIAGE NOTES
Patient arrived via EMS. Patient complaints of RLQ abdominal pain, diarrhea x 3 at home. Denies NV. Denies painful urination. Alert,oriented, x 4. Denies CP, SOB, dizziness. Hx of cancer. On chemo.

## 2022-05-16 NOTE — PLAN OF CARE
05/16/22 0231   Admission   Initial VN Admission Questions Complete   Communication Issues? None   Shift   Virtual Nurse - Rounding Complete   Pain Management Interventions quiet environment facilitated;relaxation techniques promoted   Virtual Nurse - Patient Verbalized Approval Of Camera Use;VN Rounding   Type of Frequent Check   Type Patient Rounds   Safety/Activity   Patient Rounds bed in low position;placement of personal items at bedside;bed wheels locked;call light in patient/parent reach;visualized patient;clutter free environment maintained;ID band on   Safety Promotion/Fall Prevention assistive device/personal item within reach;bed alarm set;nonskid shoes/socks when out of bed;instructed to call staff for mobility;medications reviewed;Fall Risk signage in place   Safety Precautions emergency equipment at bedside   Activity Management Rolling - L1   Positioning   Body Position position changed independently   Head of Bed (HOB) Positioning HOB elevated   Positioning/Transfer Devices pillows;in use   VN cued into room to complete admit assessment. VIP model introduced; VN working alongside bedside treatment team.  Plan of care reviewed with patient. Patient informed of fall risk, fall precautions, call light within reach, side rails x2 elevated. Patient notified to ask staff for assistance. Patient verbalized complete understanding. Time allowed for questions. Will continue to monitor and intervene as needed.

## 2022-05-16 NOTE — PLAN OF CARE
Problem: Adult Inpatient Plan of Care  Goal: Plan of Care Review  Outcome: Ongoing, Progressing   Pt AAOX4. Safety precautions maintained. Bed low and locked, call light within reach. Medications administered per MAR. Pt tolerating clear liquid diet well. US and MRI completed. IVF infusing. DVT prophylaxis continued. Hourly rounding performed. Encouraged to call for OOB assistance. Awaiting pt disposition. Pt updated on plan of care and verbalizes understanding.

## 2022-05-17 ENCOUNTER — TELEPHONE (OUTPATIENT)
Dept: NEUROLOGY | Facility: HOSPITAL | Age: 72
End: 2022-05-17
Payer: MEDICARE

## 2022-05-17 ENCOUNTER — PATIENT OUTREACH (OUTPATIENT)
Dept: ADMINISTRATIVE | Facility: OTHER | Age: 72
End: 2022-05-17
Payer: MEDICARE

## 2022-05-17 PROCEDURE — 21400001 HC TELEMETRY ROOM

## 2022-05-17 PROCEDURE — 94760 N-INVAS EAR/PLS OXIMETRY 1: CPT

## 2022-05-17 PROCEDURE — 99222 PR INITIAL HOSPITAL CARE,LEVL II: ICD-10-PCS | Mod: ,,, | Performed by: UROLOGY

## 2022-05-17 PROCEDURE — 63600175 PHARM REV CODE 636 W HCPCS: Performed by: STUDENT IN AN ORGANIZED HEALTH CARE EDUCATION/TRAINING PROGRAM

## 2022-05-17 PROCEDURE — 25000003 PHARM REV CODE 250: Performed by: STUDENT IN AN ORGANIZED HEALTH CARE EDUCATION/TRAINING PROGRAM

## 2022-05-17 PROCEDURE — 94761 N-INVAS EAR/PLS OXIMETRY MLT: CPT

## 2022-05-17 PROCEDURE — 99222 1ST HOSP IP/OBS MODERATE 55: CPT | Mod: ,,, | Performed by: UROLOGY

## 2022-05-17 PROCEDURE — 25000003 PHARM REV CODE 250: Performed by: SURGERY

## 2022-05-17 RX ORDER — LIDOCAINE 50 MG/G
1 PATCH TOPICAL DAILY
Status: DISCONTINUED | OUTPATIENT
Start: 2022-05-17 | End: 2022-05-20 | Stop reason: HOSPADM

## 2022-05-17 RX ORDER — SODIUM CHLORIDE 0.9 % (FLUSH) 0.9 %
3 SYRINGE (ML) INJECTION
Status: DISCONTINUED | OUTPATIENT
Start: 2022-05-17 | End: 2022-05-20 | Stop reason: HOSPADM

## 2022-05-17 RX ORDER — OXYCODONE HYDROCHLORIDE 5 MG/1
10 TABLET ORAL EVERY 6 HOURS PRN
Status: DISCONTINUED | OUTPATIENT
Start: 2022-05-17 | End: 2022-05-20 | Stop reason: HOSPADM

## 2022-05-17 RX ORDER — SODIUM CHLORIDE 9 MG/ML
INJECTION, SOLUTION INTRAVENOUS
Status: DISCONTINUED | OUTPATIENT
Start: 2022-05-17 | End: 2022-05-20 | Stop reason: HOSPADM

## 2022-05-17 RX ADMIN — Medication 6 MG: at 08:05

## 2022-05-17 RX ADMIN — SODIUM CHLORIDE: 0.9 INJECTION, SOLUTION INTRAVENOUS at 05:05

## 2022-05-17 RX ADMIN — LIDOCAINE 1 PATCH: 50 PATCH CUTANEOUS at 09:05

## 2022-05-17 RX ADMIN — OXYCODONE 10 MG: 5 TABLET ORAL at 02:05

## 2022-05-17 RX ADMIN — MEROPENEM 1 G: 1 INJECTION, POWDER, FOR SOLUTION INTRAVENOUS at 09:05

## 2022-05-17 RX ADMIN — SODIUM CHLORIDE: 0.9 INJECTION, SOLUTION INTRAVENOUS at 09:05

## 2022-05-17 RX ADMIN — ATORVASTATIN CALCIUM 40 MG: 40 TABLET, FILM COATED ORAL at 08:05

## 2022-05-17 RX ADMIN — LOSARTAN POTASSIUM 100 MG: 50 TABLET, FILM COATED ORAL at 09:05

## 2022-05-17 RX ADMIN — AMLODIPINE BESYLATE 5 MG: 5 TABLET ORAL at 09:05

## 2022-05-17 RX ADMIN — OXYCODONE 10 MG: 5 TABLET ORAL at 06:05

## 2022-05-17 RX ADMIN — DIPHENOXYLATE HYDROCHLORIDE AND ATROPINE SULFATE 1 TABLET: 2.5; .025 TABLET ORAL at 11:05

## 2022-05-17 RX ADMIN — METOPROLOL TARTRATE 25 MG: 25 TABLET, FILM COATED ORAL at 08:05

## 2022-05-17 RX ADMIN — METOPROLOL TARTRATE 25 MG: 25 TABLET, FILM COATED ORAL at 09:05

## 2022-05-17 RX ADMIN — OXYCODONE 10 MG: 5 TABLET ORAL at 08:05

## 2022-05-17 RX ADMIN — MEROPENEM 1 G: 1 INJECTION, POWDER, FOR SOLUTION INTRAVENOUS at 05:05

## 2022-05-17 RX ADMIN — MEROPENEM 1 G: 1 INJECTION, POWDER, FOR SOLUTION INTRAVENOUS at 12:05

## 2022-05-17 RX ADMIN — OXYCODONE 10 MG: 5 TABLET ORAL at 12:05

## 2022-05-17 RX ADMIN — OXYCODONE 5 MG: 5 TABLET ORAL at 11:05

## 2022-05-17 RX ADMIN — DIPHENOXYLATE HYDROCHLORIDE AND ATROPINE SULFATE 1 TABLET: 2.5; .025 TABLET ORAL at 05:05

## 2022-05-17 NOTE — NURSING
Patient AAOx4. Still has c/o abd pain, managed with PRN pain medication. Also request Pain patch, MD notified. New orders to be placed. Also notified MD of patient c/o R ear ache. Cont IV fluids infusing. Medications administered per orders. Up to BR with stand by assist and personal walker. Tolerating diet. VS, Labs, and tele monitored. Instructed to call for assistance. Safety maintained. Will continue to monitor.

## 2022-05-17 NOTE — TELEPHONE ENCOUNTER
----- Message from Judith Grimes, Patient Care Assistant sent at 5/16/2022 12:13 PM CDT -----  Type:  Needs Medical Advice    Who Called:  pt  Symptoms (please be specific):  Patient would like a call back to discuss can she eat now   Would the patient rather a call back or a response via [facility]?  Please call  Best Call Back Number:  [phone]  Additional Information:  pt in the hospital now in room 528 nurse name is Yolette

## 2022-05-17 NOTE — PLAN OF CARE
Sw [facility] home with IV abx.     Suzie will continue to follow pt for d/c planning.     Future Appointments   Date Time Provider Department Center   5/19/2022 10:20 AM Chris Herbert MD [facility] TUMOR [place] [facility]   6/1/2022  9:30 AM Ervin Parikh MD [facility] UROLOGY [place] [facility]         05/17/22 1157   Post-Acute Status   Post-Acute Authorization Home Health;IV Infusion   Home Health Status Referrals Sent   Coverage [employer]   IV Infusion Status Referral(s) sent   Hospital Resources/Appts/Education Provided Appointments scheduled by Navigator/Coordinator   Discharge Delays None known at this time   Discharge Plan   Discharge Plan A Home Health

## 2022-05-17 NOTE — PROGRESS NOTES
RSW met with patient to discuss resources, discharge, and additional patient barriers to care. RSW answered all pt questions in relation to resources previously provided. Pt identified no additional social barriers to care. Per pt, pt is not in need of resources at this time. RSW added [facility] resource to pt AVS, pt expressed understanding.    The following were addressed during this visit:  -Complete follow-up with patient    MADHURI Matias

## 2022-05-17 NOTE — PROGRESS NOTES
Neuroendocrine Surgery Progress Note    S:  naeo  Pain improved  Tolerating reg diet  Had hunger pains last night  MRCP with improved biliary ductal dilation, nonspecific hepatic masses    O:  Temp:  [97.6 °F (36.4 °C)-98.4 °F (36.9 °C)] 98.3 °F (36.8 °C)  Pulse:  [60-69] 68  Resp:  [16-20] 20  SpO2:  [97 %-99 %] 99 %  BP: (133-161)/(61-67) 147/61    Physical Exam:  Gen: NAD, AAOx3  HEENT: EOMI, NCAT  CV: RR  Resp: no shortness of breath, normal WOB  Abd: soft, non-distended, mild RUQ tenderness   Ext: no edema      A/P:  71F p/w RUQ pain. Patient with hx of cholecystectomy + ERCP in March 2022. Known staghorn calculi pending outpatient urology shalom. Urine cx positive for klebsiella resistant to all abx other than ertapenem and meropenem  -pain improved today  -will discuss with staff dc vs inpatient urology evnathaly Kiser MD  [employer] General Surgery, PGY-4

## 2022-05-17 NOTE — CONSULTS
[facility] Surg  Urology  Consult Note    Patient Name: Patito Domingo  MRN: [id number]  Admission Date: 5/15/2022  Attending Provider: ENA Sánchez MD   Consulting Provider: Ervin Parikh MD  Principal Problem:<principal problem not specified>    Consults    Subjective:     HPI:   Patito Domingo is a 71 y.o. female who presents with abdominal pain.  Known to me.  Has h/o chronic nonobstructing kidney stones and recurrent UTI.  Reports had ERCP 3/4/22 and since then abdominal pain.  Admitted earlier this month.  Reports few days of RLQ and RUG/epigastric pain.  Notes diarrhea, no urinary symptom.  Reports chills no fevers.  Has some nausea that has resolved.  No overt flank pain.    Long history of known bilateral kidney stones managed at [facility] in past.  New consult to me 2022 for hydro that resolved.  Has met NET, chronic pain.       CT w/o contrast due to shortage shows no changes since last CT in April. She does have persistent pneumobilia presumed from ERCP. Also w/ known renal calculi that are nonobstructing.     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 ENDO (2ND FLR);  Service: Endoscopy;  Laterality: N/A;    ERCP N/A 3/4/2022    Procedure: ERCP (ENDOSCOPIC RETROGRADE CHOLANGIOPANCREATOGRAPHY);  Surgeon: Roby Virgen MD;  [facility]  Service: Endoscopy;  Laterality: N/A;    EYE SURGERY      HYSTERECTOMY  5/1996    LITHOTRIPSY      LIVER BIOPSY  9/14    carcinoid    URETEROSCOPY Right 10/10/2019    Procedure: URETEROSCOPY;  Surgeon: Gen Isbell MD;  Location: [facility] OR;  Service: Urology;  Laterality: Right;    UTERINE FIBROID SURGERY         Review of patient's allergies indicates:   Allergen Reactions    Contrast media Hives, Itching and Swelling    Epinephrine Anaphylaxis     Can cause  a Carcinoid Crisis    Ibuprofen Hives, Itching and Swelling    Zofran [ondansetron hcl] Itching     And multiple other reactions    Iodinated contrast media     Morphine Other (See Comments)    Sulfa (sulfonamide antibiotics) Hives, Itching and Swelling       Family History     Problem Relation (Age of Onset)    Alzheimer's disease Father    Cancer Mother    No Known Problems Son, Son, Son, Son    Stroke Sister          Tobacco Use    Smoking status: Never Smoker    Smokeless tobacco: Never Used   Substance and Sexual Activity    Alcohol use: No     Alcohol/week: 0.0 standard drinks    Drug use: No    Sexual activity: Not Currently       Review of Systems   Constitutional: Positive for activity change, appetite change and chills. Negative for fever.   Cardiovascular: Negative for chest pain.   Gastrointestinal: Positive for abdominal distention, diarrhea and nausea. Negative for vomiting.   Genitourinary: Negative for dysuria, flank pain and hematuria.   Neurological: Negative for dizziness.   Psychiatric/Behavioral: Negative for confusion.       Objective:     Temp:  [97.6 °F (36.4 °C)-98.4 °F (36.9 °C)] 97.6 °F (36.4 °C)  Pulse:  [60-73] 73  Resp:  [16-20] 20  SpO2:  [97 %-99 %] 99 %  BP: (123-161)/(58-67) 154/65        NAD  RRR  CTAB  ABD S/ND/NTTP      No holbrook        Ext: no edema      Significant Labs:  BMP:  Recent Labs   Lab 05/15/22  2037 05/16/22  0653    139   K 3.5 3.6    106   CO2 23 28   BUN 13 10   CREATININE 0.9 0.8   CALCIUM 9.7 9.0       CBC:  Recent Labs   Lab 05/15/22  2037 05/16/22  0653   WBC 4.50 4.15   HGB 10.2* 10.1*   HCT 33.4* 32.4*    199       Urinalysis  No results for input(s): COLORU, CLARITYU, SPECGRAV, PHUR, PROTEINUA, GLUCOSEU, BILIRUBINCON, BLOODU, WBCU, RBCU, BACTERIA, MUCUS, NITRITE, LEUKOCYTESUR, UROBILINOGEN, HYALINECASTS in the last 24 hours.    Results for orders placed or performed during the hospital encounter of 05/15/22 (from the past 2160 hour(s))   CT Abdomen Pelvis  Without Contrast    Impression    Stable CT of the abdomen and pelvis since April 30, 2022 exam with no acute findings to explain right lower quadrant pain.    Multiple chronic findings, stable as described above.      Electronically signed by: Romeo Carrizales  Date:    05/15/2022  Time:    22:07     *Note: Due to a large number of results and/or encounters for the requested time period, some results have not been displayed. A complete set of results can be found in Results Review.       Significant Imaging:  CT: I have reviewed all results within the past 24 hours and my personal findings are:  Stable findings      Assessment:     Problem Noted   Bilateral Nephrolithiasis 9/17/2019    Bilateral lower poles that are chronic (followed by Dr. Marques and did not recommend intervention given would need PCNL and risks)  --recurrent UTI  -- abd pain       Abdominal Pain    Kidney Stone 12/1/2014    Stable chronic      Staghorn Calculus 10/15/2020    Chronic stable, follows with [facility]     Urinary Tract Infection Without Hematuria 4/28/2018     Plan:    1.  Unclear if pain related to stones, doubt given chronicity of stones and recent pain since ERCP, other reasons for abd pain/chronic pain  2. Recommend  follow up final culture and treat   3. Follow up outpt for stone treatment discussion.  She is high risk of complications and would need clearance of all bilateral stones.  Have discussed risks of sepsis, worsening kidney function, need for invasive procedures.  Will revisit in outpatient setting  4. Will sign off please call with questions.     Thank you for your consult.     Ervin Parikh MD  [facility]

## 2022-05-17 NOTE — PLAN OF CARE
Problem: Adult Inpatient Plan of Care  Goal: Plan of Care Review  Outcome: Ongoing, Progressing   Pt AAOX4. Safety precautions maintained. Bed low and locked, call light within reach. Medications administered per MAR. Pt tolerating diet well. IV abx continued. DVT prophylaxis continued. Hourly rounding performed. Encouraged to call for OOB assistance. Awaiting pt disposition. Pt updated on plan of care and verbalizes understanding.

## 2022-05-18 LAB — BACTERIA UR CULT: ABNORMAL

## 2022-05-18 PROCEDURE — 25000003 PHARM REV CODE 250: Performed by: STUDENT IN AN ORGANIZED HEALTH CARE EDUCATION/TRAINING PROGRAM

## 2022-05-18 PROCEDURE — 87086 URINE CULTURE/COLONY COUNT: CPT | Performed by: STUDENT IN AN ORGANIZED HEALTH CARE EDUCATION/TRAINING PROGRAM

## 2022-05-18 PROCEDURE — 94761 N-INVAS EAR/PLS OXIMETRY MLT: CPT

## 2022-05-18 PROCEDURE — 63600175 PHARM REV CODE 636 W HCPCS: Performed by: STUDENT IN AN ORGANIZED HEALTH CARE EDUCATION/TRAINING PROGRAM

## 2022-05-18 PROCEDURE — 25000003 PHARM REV CODE 250: Performed by: SURGERY

## 2022-05-18 PROCEDURE — 21400001 HC TELEMETRY ROOM

## 2022-05-18 PROCEDURE — 27000207 HC ISOLATION

## 2022-05-18 RX ORDER — L. ACIDOPHILUS/L.BULGARICUS 100MM CELL
1 GRANULES IN PACKET (EA) ORAL 2 TIMES DAILY
Status: DISCONTINUED | OUTPATIENT
Start: 2022-05-18 | End: 2022-05-20 | Stop reason: HOSPADM

## 2022-05-18 RX ORDER — METHOCARBAMOL 500 MG/1
500 TABLET, FILM COATED ORAL 4 TIMES DAILY
Status: DISCONTINUED | OUTPATIENT
Start: 2022-05-18 | End: 2022-05-20 | Stop reason: HOSPADM

## 2022-05-18 RX ORDER — GABAPENTIN 300 MG/1
300 CAPSULE ORAL 3 TIMES DAILY
Status: DISCONTINUED | OUTPATIENT
Start: 2022-05-18 | End: 2022-05-20 | Stop reason: HOSPADM

## 2022-05-18 RX ORDER — OXYCODONE HYDROCHLORIDE 5 MG/1
5 TABLET ORAL EVERY 6 HOURS PRN
Status: DISCONTINUED | OUTPATIENT
Start: 2022-05-18 | End: 2022-05-20 | Stop reason: HOSPADM

## 2022-05-18 RX ORDER — POLYETHYLENE GLYCOL 3350 17 G/17G
17 POWDER, FOR SOLUTION ORAL DAILY
Status: DISCONTINUED | OUTPATIENT
Start: 2022-05-18 | End: 2022-05-20 | Stop reason: HOSPADM

## 2022-05-18 RX ORDER — TRAMADOL HYDROCHLORIDE 50 MG/1
50 TABLET ORAL EVERY 6 HOURS
Status: DISCONTINUED | OUTPATIENT
Start: 2022-05-18 | End: 2022-05-20 | Stop reason: HOSPADM

## 2022-05-18 RX ORDER — ACETAMINOPHEN 500 MG
1000 TABLET ORAL EVERY 6 HOURS
Status: DISCONTINUED | OUTPATIENT
Start: 2022-05-18 | End: 2022-05-20 | Stop reason: HOSPADM

## 2022-05-18 RX ADMIN — GABAPENTIN 300 MG: 300 CAPSULE ORAL at 09:05

## 2022-05-18 RX ADMIN — LACTOBACILLUS ACIDOPHILUS / LACTOBACILLUS BULGARICUS 1 EACH: 100 MILLION CFU STRENGTH GRANULES at 09:05

## 2022-05-18 RX ADMIN — OXYCODONE 10 MG: 5 TABLET ORAL at 03:05

## 2022-05-18 RX ADMIN — METHOCARBAMOL TABLETS 500 MG: 500 TABLET, COATED ORAL at 09:05

## 2022-05-18 RX ADMIN — MEROPENEM 1 G: 1 INJECTION, POWDER, FOR SOLUTION INTRAVENOUS at 09:05

## 2022-05-18 RX ADMIN — ACETAMINOPHEN 1000 MG: 500 TABLET ORAL at 05:05

## 2022-05-18 RX ADMIN — MEROPENEM 1 G: 1 INJECTION, POWDER, FOR SOLUTION INTRAVENOUS at 05:05

## 2022-05-18 RX ADMIN — ATORVASTATIN CALCIUM 40 MG: 40 TABLET, FILM COATED ORAL at 09:05

## 2022-05-18 RX ADMIN — LOSARTAN POTASSIUM 100 MG: 50 TABLET, FILM COATED ORAL at 09:05

## 2022-05-18 RX ADMIN — OXYCODONE 10 MG: 5 TABLET ORAL at 09:05

## 2022-05-18 RX ADMIN — SODIUM CHLORIDE: 0.9 INJECTION, SOLUTION INTRAVENOUS at 01:05

## 2022-05-18 RX ADMIN — Medication 6 MG: at 09:05

## 2022-05-18 RX ADMIN — AMLODIPINE BESYLATE 5 MG: 5 TABLET ORAL at 09:05

## 2022-05-18 RX ADMIN — METOPROLOL TARTRATE 25 MG: 25 TABLET, FILM COATED ORAL at 09:05

## 2022-05-18 RX ADMIN — MEROPENEM 1 G: 1 INJECTION, POWDER, FOR SOLUTION INTRAVENOUS at 01:05

## 2022-05-18 RX ADMIN — OXYCODONE 10 MG: 5 TABLET ORAL at 02:05

## 2022-05-18 RX ADMIN — LIDOCAINE 1 PATCH: 50 PATCH CUTANEOUS at 09:05

## 2022-05-18 NOTE — PLAN OF CARE
Suzie spoke with Jesenia with [facility]. Per Jesenia, pt has been educated on IV abx.     Suzie spoke with May with [facility] . Sw informed May of pt's updated d/c date.     Suzie met with pt to discuss d/c planning. Sw discussed with about the possibility of pt discharging home with HH services and IV abx. Pt stated that pt's son Ryan cannot help pt due to him being sick. Pt stated that her son Matias would possibly help her. Pt stated that she is agreeable to discharge home with IV abx as long as pt has HH services. Sw informed pt that she has been accepted for [facility] . Pt is agreeable to discharge home with [facility]  and [facility]. Suzie encouraged pt to call with any questions or concerns. Suzie will continue to follow pt for d/c planning.     Future Appointments   Date Time Provider Department Center   6/1/2022  9:30 AM Ervin Parikh MD [facility] UROLOGY [place] [facility]         05/18/22 150   Post-Acute Status   Post-Acute Authorization Home Health;IV Infusion   Home Health Status Pending medical clearance/testing   Coverage [employer]   IV Infusion Status Set-up Complete/Auth obtained  (Patient educated today on IV abx.)   Discharge Delays None known at this time   Discharge Plan   Discharge Plan A Home Health

## 2022-05-18 NOTE — PROGRESS NOTES
Neuroendocrine Surgery Progress Note    S:  naeo  Pain improved  Tolerating reg diet  Urine cx--GNRs    O:  Temp:  [97.6 °F (36.4 °C)-99.3 °F (37.4 °C)] 98.1 °F (36.7 °C)  Pulse:  [52-74] 52  Resp:  [16-20] 18  SpO2:  [97 %-99 %] 97 %  BP: (123-161)/(58-71) 137/63    Physical Exam:  Gen: NAD, AAOx3  HEENT: EOMI, NCAT  CV: RR  Resp: no shortness of breath, normal WOB  Abd: soft, non-distended, mild RUQ tenderness   Ext: no edema      A/P:  71F p/w RUQ pain. Patient with hx of cholecystectomy + ERCP in March 2022. Known staghorn calculi pending outpatient urology eval. Urine cx positive for klebsiella resistant to all abx other than ertapenem and meropenem  -per urology needs straight cath and re-cx, they will schedule her for stone removal in outpatient setting after further discussion     Rodolfo Kiser MD  [employer] [facility], PGY-4

## 2022-05-18 NOTE — PROGRESS NOTES
Pt refusing PICC, stating her shoulders are hurting from last one, and she has nobody at home to help. Notified Dr. Yolanda Glynn. Md stated she will talk with pt tomorrow about getting one placed.

## 2022-05-18 NOTE — PLAN OF CARE
Patient AAOx4, VSS, patient in NAD. Patient c/o pain, managed with PRN medications. Patient free from falls. Contact precautions initiated per order. Call bell in reach. Safety maintained. Will continue to monitor.   Problem: Adult Inpatient Plan of Care  Goal: Plan of Care Review  Outcome: Ongoing, Progressing  Goal: Patient-Specific Goal (Individualized)  Outcome: Ongoing, Progressing  Goal: Absence of Hospital-Acquired Illness or Injury  Outcome: Ongoing, Progressing  Goal: Optimal Comfort and Wellbeing  Outcome: Ongoing, Progressing  Goal: Readiness for Transition of Care  Outcome: Ongoing, Progressing     Problem: Fluid and Electrolyte Imbalance (Acute Kidney Injury/Impairment)  Goal: Fluid and Electrolyte Balance  Outcome: Ongoing, Progressing     Problem: Oral Intake Inadequate (Acute Kidney Injury/Impairment)  Goal: Optimal Nutrition Intake  Outcome: Ongoing, Progressing     Problem: Renal Function Impairment (Acute Kidney Injury/Impairment)  Goal: Effective Renal Function  Outcome: Ongoing, Progressing     Problem: Infection  Goal: Absence of Infection Signs and Symptoms  Outcome: Ongoing, Progressing     Problem: UTI (Urinary Tract Infection)  Goal: Improved Infection Symptoms  Outcome: Ongoing, Progressing     Problem: Hypertension Comorbidity  Goal: Blood Pressure in Desired Range  Outcome: Ongoing, Progressing     Problem: Fall Injury Risk  Goal: Absence of Fall and Fall-Related Injury  Outcome: Ongoing, Progressing

## 2022-05-18 NOTE — PHARMACY MED REC
"Admission Medication History     The home medication history was taken by Lucy Sousa PharmD.    Medication history obtained from patient    You may go to "Admission" then "Reconcile Home Medications" tabs to review and/or act upon these items.      The home medication list has been updated by the Pharmacy department.    Please read ALL comments highlighted in yellow.    Please address this information as you see fit.     Feel free to contact us if you have any questions or require assistance.      The medications listed below were removed from the home medication list.  Please reorder if appropriate:    o Patient reports he/she IS TAKING the following which was not ordered upon admit  o Cyanocobalamin 1000mcg daily  o MgOx 400mg twice daily        Lucy Sousa PharmD.                  .          "

## 2022-05-19 LAB — BACTERIA UR CULT: NO GROWTH

## 2022-05-19 PROCEDURE — 99900035 HC TECH TIME PER 15 MIN (STAT)

## 2022-05-19 PROCEDURE — 27000207 HC ISOLATION

## 2022-05-19 PROCEDURE — 25000003 PHARM REV CODE 250: Performed by: STUDENT IN AN ORGANIZED HEALTH CARE EDUCATION/TRAINING PROGRAM

## 2022-05-19 PROCEDURE — 99232 PR SUBSEQUENT HOSPITAL CARE,LEVL II: ICD-10-PCS | Mod: ,,, | Performed by: UROLOGY

## 2022-05-19 PROCEDURE — 21400001 HC TELEMETRY ROOM

## 2022-05-19 PROCEDURE — 97116 GAIT TRAINING THERAPY: CPT

## 2022-05-19 PROCEDURE — 99232 SBSQ HOSP IP/OBS MODERATE 35: CPT | Mod: ,,, | Performed by: UROLOGY

## 2022-05-19 PROCEDURE — 97161 PT EVAL LOW COMPLEX 20 MIN: CPT

## 2022-05-19 PROCEDURE — 63600175 PHARM REV CODE 636 W HCPCS: Performed by: STUDENT IN AN ORGANIZED HEALTH CARE EDUCATION/TRAINING PROGRAM

## 2022-05-19 PROCEDURE — 97530 THERAPEUTIC ACTIVITIES: CPT

## 2022-05-19 PROCEDURE — 94761 N-INVAS EAR/PLS OXIMETRY MLT: CPT

## 2022-05-19 RX ORDER — LIDOCAINE 50 MG/G
1 PATCH TOPICAL
Status: DISCONTINUED | OUTPATIENT
Start: 2022-05-19 | End: 2022-05-20 | Stop reason: HOSPADM

## 2022-05-19 RX ORDER — OXYCODONE HYDROCHLORIDE 10 MG/1
10 TABLET ORAL EVERY 6 HOURS PRN
Qty: 10 TABLET | Refills: 0 | Status: SHIPPED | OUTPATIENT
Start: 2022-05-19 | End: 2022-06-21 | Stop reason: SDUPTHER

## 2022-05-19 RX ADMIN — GABAPENTIN 300 MG: 300 CAPSULE ORAL at 03:05

## 2022-05-19 RX ADMIN — LACTOBACILLUS ACIDOPHILUS / LACTOBACILLUS BULGARICUS 1 EACH: 100 MILLION CFU STRENGTH GRANULES at 08:05

## 2022-05-19 RX ADMIN — OXYCODONE 10 MG: 5 TABLET ORAL at 09:05

## 2022-05-19 RX ADMIN — MEROPENEM 1 G: 1 INJECTION, POWDER, FOR SOLUTION INTRAVENOUS at 05:05

## 2022-05-19 RX ADMIN — MEROPENEM 1 G: 1 INJECTION, POWDER, FOR SOLUTION INTRAVENOUS at 01:05

## 2022-05-19 RX ADMIN — OXYCODONE 10 MG: 5 TABLET ORAL at 03:05

## 2022-05-19 RX ADMIN — ATORVASTATIN CALCIUM 40 MG: 40 TABLET, FILM COATED ORAL at 08:05

## 2022-05-19 RX ADMIN — LIDOCAINE 1 PATCH: 50 PATCH CUTANEOUS at 09:05

## 2022-05-19 RX ADMIN — LIDOCAINE 1 PATCH: 50 PATCH CUTANEOUS at 07:05

## 2022-05-19 RX ADMIN — METOPROLOL TARTRATE 25 MG: 25 TABLET, FILM COATED ORAL at 08:05

## 2022-05-19 RX ADMIN — LOSARTAN POTASSIUM 100 MG: 50 TABLET, FILM COATED ORAL at 08:05

## 2022-05-19 RX ADMIN — LACTOBACILLUS ACIDOPHILUS / LACTOBACILLUS BULGARICUS 1 EACH: 100 MILLION CFU STRENGTH GRANULES at 09:05

## 2022-05-19 RX ADMIN — Medication 6 MG: at 09:05

## 2022-05-19 RX ADMIN — MEROPENEM 1 G: 1 INJECTION, POWDER, FOR SOLUTION INTRAVENOUS at 09:05

## 2022-05-19 RX ADMIN — AMLODIPINE BESYLATE 5 MG: 5 TABLET ORAL at 08:05

## 2022-05-19 NOTE — PROGRESS NOTES
[facility] - Telemetry  Urology  Progress Note    Patient Name: Patito Domingo  MRN: [id number]  Admission Date: 5/15/2022  Hospital Length of Stay: 3 days    Subjective:     Interval History: ELIJAH.  Minimal abdominal pain.  No N/V.  Voiding w/o issues.      Objective:     Temp:  [97.4 °F (36.3 °C)-98.6 °F (37 °C)] 97.6 °F (36.4 °C)  Pulse:  [54-75] 59  Resp:  [16-20] 16  SpO2:  [95 %-99 %] 95 %  BP: (119-176)/(63-84) 119/75       NAD  RRR  CTAB  ABD S/ND/NTTP       Ext: no edema    Significant Labs:  BMP:  Recent Labs   Lab 05/15/22  2037 05/16/22  0653    139   K 3.5 3.6    106   CO2 23 28   BUN 13 10   CREATININE 0.9 0.8   CALCIUM 9.7 9.0       CBC:  Recent Labs   Lab 05/15/22  2037 05/16/22  0653   WBC 4.50 4.15   HGB 10.2* 10.1*   HCT 33.4* 32.4*    199         Urology Specific Assessment:     Problem Noted   Bilateral Nephrolithiasis 9/17/2019    Bilateral lower poles that are chronic (followed by Dr. Marques and did not recommend intervention given would need PCNL and risks)  --recurrent UTI  -- abd pain       Abdominal Pain    Kidney Stone 12/1/2014    Stable chronic      Staghorn Calculus 10/15/2020    Chronic stable, follows with [facility] urology     Urinary Tract Infection Without Hematuria 4/28/2018     Plan:   1. Again, discussed stone intervention with patient.  She feels pain not from stones given chronicity and unclear if from UTI given clean cath urine on admit.  Repeat cath urine pending  2. Continue antibiotic, follow up cath urine  3. Does not want bilateral stone intervention (Right PCNL, left URS)   4. Follow up in office to discuss further, if becomes agreeable to procedure let me know and will revisit.   5. Will sign off please call with questions.       Ervin Parikh MD  Urology

## 2022-05-19 NOTE — PT/OT/SLP EVAL
"Physical Therapy Evaluation    Patient Name:  Patito Domingo   MRN:  [id number]    Recommendations:     Discharge Recommendations:  home health PT, home   Discharge Equipment Recommendations: none   Barriers to discharge: None    Assessment:     Patito Domingo is a 71 y.o. female admitted with a medical diagnosis of The primary encounter diagnosis was History of ESBL E. coli infection. 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  .  She presents with the following impairments/functional limitations:  weakness, gait instability, impaired balance, impaired endurance, impaired functional mobilty, pain, decreased lower extremity function, decreased upper extremity function .Pt performed supine>sit with Supervision. Pt ambulated ~6 ft and ~100 ft with SBA-SPV and rollator. Recommending  PT upon d/c. Pt owns all recommended DME.     Rehab Prognosis: Good; patient would benefit from acute skilled PT services to address these deficits and reach maximum level of function.    Recent Surgery: * No surgery found *      Plan:     During this hospitalization, patient to be seen 2 x/week to address the identified rehab impairments via gait training, therapeutic activities, therapeutic exercises, neuromuscular re-education and progress toward the following goals:    · Plan of Care Expires:  06/19/22    Subjective     Chief Complaint: B shoulder pain from "being stuck with the needle for the antibiotics"  Patient/Family Comments/goals: Return home to [facility]  Pain/Comfort:  · Pain Rating 1: 8/10  · Location - Side 1: Bilateral  · Location - Orientation 1: generalized  · Location 1: shoulder  · Pain Addressed 1: Reposition, Distraction, Cessation of Activity, Nurse notified  · Pain Rating " Post-Intervention 1: 8/10    Patients cultural, spiritual, Worship conflicts given the current situation: no    Living Environment:  Pt lives alone in a 1st floor apt with 0 KESHAWN, T/S with TTB.   Prior to admission, patients level of function was Mod I with rollator.  Equipment used at home: rollator, bedside commode, bath bench, walker, rolling, wheelchair.   Upon discharge, patient will have assistance from family but limited.    Objective:     Communicated with nsg prior to session.  Patient found HOB elevated with    upon PT entry to room.    General Precautions: Standard, fall   Orthopedic Precautions:N/A   Braces: N/A  Respiratory Status: Room air    Exams:  · Cognitive Exam:  Patient is oriented WFL  · Postural Exam:  Patient presented with the following abnormalities:    · -       Rounded shoulders  · -       Forward head  · -       Kyphosis  · Sensation:    · -       Intact  light/touch BLE  · Skin Integrity/Edema:      · -       Skin integrity: Visible skin intact  · -       Edema: None noted BLE  · UE ROM/MMT: shoulder flexion limited by B shld pain, otherwise, pt WFL ROM but grossly 4-/5 for strength  · RLE ROM: WFL  · RLE Strength: Grossly 4-/5  · LLE ROM: WFL  · LLE Strength: Grossly 4-/5    Functional Mobility:  · Bed Mobility:     · Scooting: supervision  · Supine to Sit: supervision  · Transfers:     · Sit to Stand:  stand by assistance with 4 wheeled walker  · Bed to Chair: stand by assistance with  4 wheeled walker  using  Step Transfer  · Toilet Transfer: supervision and stand by assistance with  4 wheeled walker  using  Step Transfer  · Gait: Pt ambulated ~6 ft and ~100 ft with SBA-SPV and rollator. Forward flexed trunl posture noted - VC's for upright; pt reporting ambulating increasing B shld pain    Therapeutic Activities and Exercises:   Pt educated on role of PT/POC, overall safety using DME for mobility/transfers, use of RW to improve upright posture, and overall home safety.  Pt  ambulated to restroom. No assist needed for hygiene.  Pt ambulated ~8 ft to sink to perform G&H seated on rollator.  VC's to use brakes at times on rollator.  Pt ambulated in dunn as reported above then returned seated in chair.  Increased WOB noted with ambulation.  Discussed d/c recs.     AM-PAC 6 CLICK MOBILITY  Total Score:17     Patient left up in chair with all lines intact, call button in reach, chair alarm on and nsg notified.    GOALS:   Multidisciplinary Problems     Physical Therapy Goals        Problem: Physical Therapy    Goal Priority Disciplines Outcome Goal Variances Interventions   Physical Therapy Goal     PT, PT/OT Ongoing, Progressing     Description: Goals to be met by: 22     Patient will increase functional independence with mobility by performin. Supine to sit with Modified [place]  2. Sit to stand transfer with Modified [place]  3. Bed to chair transfer with Modified [place] using rollator  4. Gait  x 200 feet with Modified [place] using rollator.   5. 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)        Past Surgical History:   Procedure Laterality Date    ABDOMINAL SURGERY      CATARACT EXTRACTION Left 10/2017     SECTION      CHOLECYSTECTOMY      COLON SURGERY      cystoscope      CYSTOSCOPY W/ RETROGRADES Right 10/10/2019    Procedure: CYSTOSCOPY, WITH RETROGRADE PYELOGRAM;  Surgeon: Gen Isbell MD;  Location: [facility] OR;  Service: Urology;  Laterality: Right;    ERCP N/A 2021    Procedure: ERCP  (ENDOSCOPIC RETROGRADE CHOLANGIOPANCREATOGRAPHY);  Surgeon: Jayjay Rivera MD;  Location: [facility] ([address] [place] [address]);  Service: Endoscopy;  Laterality: N/A;    ERCP N/A 3/4/2022    Procedure: ERCP (ENDOSCOPIC RETROGRADE CHOLANGIOPANCREATOGRAPHY);  Surgeon: Roby Virgen MD;  Location: [address]);  Service: Endoscopy;  Laterality: N/A;    EYE SURGERY      HYSTERECTOMY  5/1996    LITHOTRIPSY      LIVER BIOPSY  9/14    carcinoid    URETEROSCOPY Right 10/10/2019    Procedure: URETEROSCOPY;  Surgeon: Gen Isbell MD;  Location[facility];  Service: Urology;  Laterality: Right;    UTERINE FIBROID SURGERY         Time Tracking:     PT Received On: 05/19/22  PT Start Time: 1402     PT Stop Time: 1436  PT Total Time (min): 34 min     Billable Minutes: Evaluation 10, Gait Training 12 and Therapeutic Activity 12       05/19/2022

## 2022-05-19 NOTE — PLAN OF CARE
Problem: Physical Therapy  Goal: Physical Therapy Goal  Description: Goals to be met by: 22     Patient will increase functional independence with mobility by performin. Supine to sit with Modified [place]  2. Sit to stand transfer with Modified [place]  3. Bed to chair transfer with Modified [place] using rollator  4. Gait  x 200 feet with Modified [place] using rollator.   5. Lower extremity exercise program x10 reps per handout, with independence    Outcome: Ongoing, Progressing     PT Eval completed, note to follow. Pt performed supine>sit with Supervision. Pt ambulated ~6 ft and ~100 ft with SBA-SPV and rollator. Recommending  PT upon d/c. Pt owns all recommended DME.

## 2022-05-19 NOTE — PLAN OF CARE
Sw met with pt to discuss d/c planning. Sw discussed with pt about discharging home with  services with [facility]. Pt is agreeable to discharge home with  services with [facility]. Sw discussed with pt about discharging home [facility] incase pt discharges with IV abx. Pt is agreeable to discharge home with [facility] incase pt needs IV abx upon d/c. Sw encouraged pt to call with any questions or concerns. Sw will continue to follow pt for d/c planning.     Future Appointments   Date Time Provider Department Center   6/1/2022  9:30 AM Ervin Parikh MD [facility] UROLOGY [facility]         05/19/22 1206   Post-Acute Status   Post-Acute Authorization Home Health;IV Infusion   Home Health Status Pending medical clearance/testing   Coverage [employer]   IV Infusion Status Referral(s) sent   Hospital Resources/Appts/Education Provided Appointments scheduled by Navigator/Coordinator   Discharge Delays None known at this time   Discharge Plan   Discharge Plan A Home Health

## 2022-05-19 NOTE — PLAN OF CARE
Pts safety maintained, bed alarm on, call bell in reach. Meds given per MAR. Pain relieved with PRN Hydrocodone. No N/V, SOB, distress during night. Vitals stable.

## 2022-05-19 NOTE — PROGRESS NOTES
Neuroendocrine Surgery Progress Note    S:  naeo  Pain improved  Tolerating reg diet  Urine cx--GNRs    O:  Temp:  [97.4 °F (36.3 °C)-98.6 °F (37 °C)] 97.6 °F (36.4 °C)  Pulse:  [54-75] 59  Resp:  [16-20] 16  SpO2:  [95 %-99 %] 95 %  BP: (119-176)/(63-84) 119/75    Physical Exam:  Gen: NAD, AAOx3  HEENT: EOMI, NCAT  CV: RR  Resp: no shortness of breath, normal WOB  Abd: soft, non-distended, mild RUQ tenderness   Ext: no edema      A/P:  71F p/w RUQ pain. Patient with hx of cholecystectomy + ERCP in March 2022. Known staghorn calculi pending outpatient urology eval. Urine cx positive for klebsiella resistant to all abx other than ertapenem and meropenem    -per urology needs straight cath and re-cx, they will schedule her for stone removal in outpatient setting after further discussion   -Pending clean catch urine cx results     Rosy Glynn MD  [employer] [facility] Surgery, PGY-2

## 2022-05-20 ENCOUNTER — TELEPHONE (OUTPATIENT)
Dept: PHARMACY | Facility: CLINIC | Age: 72
End: 2022-05-20
Payer: MEDICARE

## 2022-05-20 ENCOUNTER — PATIENT OUTREACH (OUTPATIENT)
Dept: ADMINISTRATIVE | Facility: OTHER | Age: 72
End: 2022-05-20
Payer: MEDICARE

## 2022-05-20 VITALS
HEIGHT: 66 IN | HEART RATE: 66 BPM | TEMPERATURE: 99 F | SYSTOLIC BLOOD PRESSURE: 139 MMHG | RESPIRATION RATE: 18 BRPM | OXYGEN SATURATION: 98 % | WEIGHT: 170 LBS | DIASTOLIC BLOOD PRESSURE: 69 MMHG | BODY MASS INDEX: 27.32 KG/M2

## 2022-05-20 PROCEDURE — 94761 N-INVAS EAR/PLS OXIMETRY MLT: CPT

## 2022-05-20 PROCEDURE — 63600175 PHARM REV CODE 636 W HCPCS: Performed by: STUDENT IN AN ORGANIZED HEALTH CARE EDUCATION/TRAINING PROGRAM

## 2022-05-20 PROCEDURE — 25000003 PHARM REV CODE 250: Performed by: STUDENT IN AN ORGANIZED HEALTH CARE EDUCATION/TRAINING PROGRAM

## 2022-05-20 PROCEDURE — 97110 THERAPEUTIC EXERCISES: CPT | Mod: CQ

## 2022-05-20 PROCEDURE — 97116 GAIT TRAINING THERAPY: CPT | Mod: CQ

## 2022-05-20 PROCEDURE — 99900035 HC TECH TIME PER 15 MIN (STAT)

## 2022-05-20 RX ORDER — LIDOCAINE 50 MG/G
1 PATCH TOPICAL DAILY
Qty: 15 PATCH | Refills: 0 | Status: SHIPPED | OUTPATIENT
Start: 2022-05-20 | End: 2022-06-02 | Stop reason: SDUPTHER

## 2022-05-20 RX ADMIN — AMLODIPINE BESYLATE 5 MG: 5 TABLET ORAL at 08:05

## 2022-05-20 RX ADMIN — MEROPENEM 1 G: 1 INJECTION, POWDER, FOR SOLUTION INTRAVENOUS at 01:05

## 2022-05-20 RX ADMIN — METOPROLOL TARTRATE 25 MG: 25 TABLET, FILM COATED ORAL at 08:05

## 2022-05-20 RX ADMIN — OXYCODONE 10 MG: 5 TABLET ORAL at 03:05

## 2022-05-20 RX ADMIN — OXYCODONE 10 MG: 5 TABLET ORAL at 09:05

## 2022-05-20 RX ADMIN — LOSARTAN POTASSIUM 100 MG: 50 TABLET, FILM COATED ORAL at 08:05

## 2022-05-20 RX ADMIN — LIDOCAINE 1 PATCH: 50 PATCH CUTANEOUS at 08:05

## 2022-05-20 NOTE — PLAN OF CARE
Problem: Physical Therapy  Goal: Physical Therapy Goal  Description: Goals to be met by: 22     Patient will increase functional independence with mobility by performin. Supine to sit with Modified [place]  2. Sit to stand transfer with Modified [place]  3. Bed to chair transfer with Modified [place] using rollator  4. Gait  x 200 feet with Modified [place] using rollator.   5. Lower extremity exercise program x10 reps per handout, with independence    Outcome: Ongoing, Progressing

## 2022-05-20 NOTE — PT/OT/SLP PROGRESS
Physical Therapy Treatment    Patient Name:  Patito Domingo   MRN:  [id number]    Recommendations:     Discharge Recommendations:  home health PT   Discharge Equipment Recommendations: none   Barriers to discharge: decreased mobility,strength and endurance    Assessment:     Patito Domingo is a 71 y.o. female admitted with a medical diagnosis of Right upper quadrant pain.  She presents with the following impairments/functional limitations:  weakness, impaired endurance, impaired functional mobilty, gait instability, impaired balance, decreased upper extremity function, decreased lower extremity function, pain, decreased ROM, impaired coordination, impaired joint extensibility,pt with improving status and requires assistance with all mobility at this time,pt will benefit from  services upon discharge.    Rehab Prognosis: Good; patient would benefit from acute skilled PT services to address these deficits and reach maximum level of function.    Recent Surgery: * No surgery found *      Plan:     During this hospitalization, patient to be seen 2 x/week to address the identified rehab impairments via gait training, therapeutic activities, therapeutic exercises, neuromuscular re-education and progress toward the following goals:    · Plan of Care Expires:  06/19/22    Subjective     Chief Complaint: n/a  Patient/Family Comments/goals: pt requested to use bathroom.  Pain/Comfort:  · Pain Rating 1:  (no rating)  · Location - Side 1: Right  · Location - Orientation 1: generalized  · Location 1: shoulder  · Pain Addressed 1: Reposition, Distraction      Objective:     Communicated with nsg prior to session.  Patient found supine with bed alarm, peripheral IV upon PT entry to room.     General Precautions: Standard, fall   Orthopedic Precautions:N/A   Braces: N/A  Respiratory Status: Room air     Functional Mobility:  · Bed Mobility:     · Supine to Sit: minimum assistance and X 2 trials  · Sit to Supine: minimum  assistance and X 2 trials  · Transfers:     · Sit to Stand:  contact guard assistance and X 3 trials with rolling walker  · Toilet Transfer: stand by assistance with  rolling walker  using  ambulation  · Gait: amb ~100' X 2 with RW and S/SBA  · Balance: fair standing balance with RW      AM-PAC 6 CLICK MOBILITY  Turning over in bed (including adjusting bedclothes, sheets and blankets)?: 3  Sitting down on and standing up from a chair with arms (e.g., wheelchair, bedside commode, etc.): 3  Moving from lying on back to sitting on the side of the bed?: 3  Moving to and from a bed to a chair (including a wheelchair)?: 3  Need to walk in hospital room?: 3  Climbing 3-5 steps with a railing?: 2  Basic Mobility Total Score: 17       Therapeutic Activities and Exercises: le seated ex's X 10-12 reps inc: ap,laq,hip flex.       Patient left supine with all lines intact, call button in reach and bed alarm on..    GOALS: see general POC  Multidisciplinary Problems     Physical Therapy Goals        Problem: Physical Therapy    Goal Priority Disciplines Outcome Goal Variances Interventions   Physical Therapy Goal     PT, PT/OT Ongoing, Progressing     Description: Goals to be met by: 22     Patient will increase functional independence with mobility by performin. Supine to sit with Modified [place]  2. Sit to stand transfer with Modified [place]  3. Bed to chair transfer with Modified [place] using rollator  4. Gait  x 200 feet with Modified [place] using rollator.   5. Lower extremity exercise program x10 reps per handout, with independence                     Time Tracking:     PT Received On: 22  PT Start Time: 900     PT Stop Time: 928  PT Total Time (min): 28 min     Billable Minutes: Gait Training 17 and Therapeutic Exercise 11    Treatment Type: Treatment  PT/PTA: PTA     PTA Visit Number: 1     2022

## 2022-05-20 NOTE — PROGRESS NOTES
Patient AAOx4, VSS, patient free from falls. Heart monitor removed. AVS printed and given to patient. Preparing for d/c

## 2022-05-20 NOTE — PROGRESS NOTES
RSW met with patient to discuss resources, discharge, and additional patient barriers to care. RSW provided pt with prescription drug savings card, pt expressed understanding. Pt identified no additional social barriers to care. Per pt, pt is not in need of resources at this time.    The following were addressed during this visit:  -Complete follow-up with patient    MADHURI Matias

## 2022-05-20 NOTE — DISCHARGE SUMMARY
[facility] Surg  General Surgery  Discharge Summary      Patient Name: Patito Domingo  MRN: [id number]  Admission Date: 5/15/2022  Hospital Length of Stay: 4 days  Discharge Date and Time:  05/20/2022 10:57 AM  Attending Physician: ENA Petty MD   Discharging Provider: Rodolfo Kiser MD  Primary Care Provider: Yasir Myers Jr, MD    HPI:   No notes on file    * No surgery found *      Indwelling Lines/Drains at time of discharge:   Lines/Drains/Airways     None               Hospital Course: Admitted for RUQ pain. US and MRCP unrevealing. Labs wnl. Patient with known hx of bilateral large kidney stones. Urine cx previously positive with resistant organism. Urology consult placed for stone removal. Planned for outpatient removal though patient unsure of surgery. Repeat urine culture from straight cath negative. Patient pain improved. Discharged with urology followup.      Goals of Care Treatment Preferences:  Code Status: Full Code      Consults:   Consults (From admission, onward)        Status Ordering Provider     Inpatient consult to PICC team ([facility])  Once        Provider:  (Not yet assigned)    Acknowledged IRA CANTOR     Inpatient consult to Urology  Once        Provider:  Ervin Parikh MD    Completed IRA CANTOR MAI-PRIMO     IP consult to case management  Once        Provider:  (Not yet assigned)    Acknowledged ENA PETTY            Pending Diagnostic Studies:     None        Final Active Diagnoses:    Diagnosis Date Noted POA    PRINCIPAL PROBLEM:  Right upper quadrant pain [R10.11]  Yes    Staghorn calculus [N20.0] 10/15/2020 Yes    Bilateral nephrolithiasis [N20.0] 09/17/2019 Yes    Chronic pain syndrome [G89.4] 12/02/2018 Yes    Urinary tract infection without hematuria [N39.0] 04/28/2018 Yes    Metastatic carcinoid tumor [C7B.00] 01/27/2015 Yes     Chronic    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 Call.    Why: to check eligibility on rental assistance. Contact [place] on [employer] regarding any additional questions or concerns.  Contact information:  Phone: [phone]           Ervin Parikh MD Follow up on 6/1/2022.    Specialty: Urology  Contact information:  [address]  [address]  [place] [address]  [phone]                       Patient Instructions:      Diet Adult Regular     Activity as tolerated     Medications:  Reconciled Home Medications:      Medication List      CHANGE how you take these medications    LIDOcaine 5 %  Commonly known as: LIDODERM  Place 1 patch onto the skin once daily. Remove & Discard patch within 12 hours or as directed by MD  What changed: additional instructions     meclizine 25 mg tablet  Commonly known as: ANTIVERT  TAKE 1 TABLET(25 MG) BY MOUTH THREE TIMES DAILY AS NEEDED FOR DIZZINESS  What changed:   · how much to take  · how to take this  · when to take this  · reasons to take this  · additional instructions     * oxyCODONE 15 MG Tab  Commonly known as: ROXICODONE  Take 15 mg by mouth every 4 (four) hours as needed (chronic abdominal pain, malignancy related).  What changed: Another medication with the same name was added. Make sure you understand how and when to take each.     * oxyCODONE 10 mg Tab immediate release tablet  Commonly known as: ROXICODONE  Take 1 tablet (10 mg total) by mouth every 6 (six) hours as needed for Pain.  What changed: You were already taking a medication with the same name, and this prescription was added. Make sure you understand how and when to take each.         * This list has 2 medication(s) that are the same as other medications prescribed for you. Read the directions carefully, and ask your doctor or other care provider to  review them with you.            CONTINUE taking these medications    amLODIPine 5 MG tablet  Commonly known as: NORVASC  Take 1 tablet (5 mg total) by mouth once daily.     atorvastatin 40 MG tablet  Commonly known as: LIPITOR  Take 1 tablet (40 mg total) by mouth every evening.     cyanocobalamin 1000 MCG tablet  Commonly known as: VITAMIN B-12  Take 1 tablet (1,000 mcg total) by mouth once daily.     diclofenac sodium 1 % Gel  Commonly known as: VOLTAREN  Apply 2 g topically 4 (four) times daily as needed (area of pain).     diphenoxylate-atropine 2.5-0.025 mg 2.5-0.025 mg per tablet  Commonly known as: LOMOTIL  TAKE 1 TABLET BY MOUTH FOUR TIMES DAILY AS NEEDED     losartan 100 MG tablet  Commonly known as: COZAAR  TAKE 1 TABLET (100 MG TOTAL) BY MOUTH ONCE DAILY.     magnesium oxide 400 mg (241.3 mg magnesium) tablet  Commonly known as: MAG-OX  Take 1 tablet (400 mg total) by mouth 2 (two) times daily.     metoprolol tartrate 25 MG tablet  Commonly known as: LOPRESSOR  TAKE 1 TABLET TWICE DAILY          Time spent on the discharge of patient: >30 minutes    Rodolfo Kiser MD  General Surgery  [facility] Surg

## 2022-05-20 NOTE — PLAN OF CARE
D/c orders noted.     Per MD, pt will be discharging home with oral abx. Suzie informed Jesenia with [facility] that pt will not be discharging with IV abx through secure chat.     Suzie met with pt to discuss d/c plans. Suzie informed pt that she will discharging home with  services with [facility]. Pt is agreeable to discharge home with  services with [facility]. Pt will start services with [facility] on Sunday; set up is completed for [facility]. Suzie completed patient choice form with pt. Pt requested transportation home upon d/c sometime this afternoon. Sw set up wheelchair van transportation for 1:00p.m. per pt's request.     Pt is cleared to go from CM standpoint.     Future Appointments   Date Time Provider Department Center   6/1/2022  9:30 AM Ervin Parikh MD [facility] UROLOGY [facility] [facility]         05/20/22 1141   Final Note   Assessment Type Final Discharge Note   Anticipated Discharge Disposition Home-Health  ([facility])   What phone number can be called within the next 1-3 days to see how you are doing after discharge? [phone]   Hospital Resources/Appts/Education Provided Appointments scheduled by Navigator/Coordinator   Post-Acute Status   Post-Acute Authorization Home Health;IV Infusion   Home Health Status Set-up Complete/Auth obtained   Coverage [employer] Managed Medicare   IV Infusion Status   (Per MD, pt will be discharging with oral abx.)   Discharge Delays None known at this time

## 2022-05-20 NOTE — PLAN OF CARE
Sw met with pt to discuss d/c planning. Sw discussed with pt about discharging home with  services. Pt is agreeable to discharge home with  services. Pt was accepted with [facility] . Pt stated she is agreeable to discharge home with  services and [facility] if pt needs IV abx upon d/c. Per MD, pt will most likely not need IV abx upon d/c. Sw informed Jesenia with [facility] that pt will most likely not need IV abx upon discharge via secure chat. Pt stated that she will most likely need a ride home upon d/c. Sw encouraged pt to call with any questions or concerns. Sw will continue to follow pt for d/c planning.     Future Appointments   Date Time Provider Department Center   6/1/2022  9:30 AM Ervin Parikh MD [facility] UROLOGY [place] [facility]         05/20/22 0854   Post-Acute Status   Post-Acute Authorization Home Health;IV Infusion   Home Health Status Pending medical clearance/testing   Coverage [employer] Managed Medicare   IV Infusion Status   (Per MDs, pt will most likely not need IV abx upon d/c.)   Hospital Resources/Appts/Education Provided Appointments scheduled by Navigator/Coordinator   Discharge Delays None known at this time   Discharge Plan   Discharge Plan A Home Health

## 2022-05-20 NOTE — PLAN OF CARE
Pt AAOx3. Medications administered per order; refused some. Pain to bilateral arms/shoulders and abdomen; managed with prn medication. Ambulates with assistance to bedside commode, voids spontaneously. Encouraged to call with questions/concerns/assistance. Safety.

## 2022-05-20 NOTE — PLAN OF CARE
VN reviewed discharge instructions with pt. Via telephone. Lou camera malfunction. Using teach back method.  AVS printed and handed to pt by bedside nurse.  Reviewed follow-up appointments, medications, diet, and importance of medication compliance.  Reviewed home care instructions, treatment plan, self-management, and when to seek medical attention.  Allowed time for questions.  All questions answered.  Patient verbalized complete understanding of discharge instructions and voices no concerns.     Discharge instructions complete.  Waiting on Bedside delivery and transportation home.  Bedside nurse notified.

## 2022-05-20 NOTE — PROGRESS NOTES
[facility]                    Pharmacy       Discharge Medication Education    Patient ACCEPTED medication education. Pharmacy has provided education on the name, indication, and possible side effects of the medication(s) prescribed, using teach-back method.     The following medications have also been discussed, during this admission.        Medication List        CHANGE how you take these medications      LIDOcaine 5 %  Commonly known as: LIDODERM  Place 1 patch onto the skin once daily. Remove & Discard patch within 12 hours or as directed by MD  What changed: additional instructions     meclizine 25 mg tablet  Commonly known as: ANTIVERT  TAKE 1 TABLET(25 MG) BY MOUTH THREE TIMES DAILY AS NEEDED FOR DIZZINESS  What changed:   how much to take  how to take this  when to take this  reasons to take this  additional instructions     * oxyCODONE 15 MG Tab  Commonly known as: ROXICODONE  What changed: Another medication with the same name was added. Make sure you understand how and when to take each.     * oxyCODONE 10 mg Tab immediate release tablet  Commonly known as: ROXICODONE  Take 1 tablet (10 mg total) by mouth every 6 (six) hours as needed for Pain.  What changed: You were already taking a medication with the same name, and this prescription was added. Make sure you understand how and when to take each.           * This list has 2 medication(s) that are the same as other medications prescribed for you. Read the directions carefully, and ask your doctor or other care provider to review them with you.                CONTINUE taking these medications      amLODIPine 5 MG tablet  Commonly known as: NORVASC  Take 1 tablet (5 mg total) by mouth once daily.     atorvastatin 40 MG tablet  Commonly known as: LIPITOR  Take 1 tablet (40 mg total) by mouth every evening.     cyanocobalamin 1000 MCG tablet  Commonly known as: VITAMIN B-12  Take 1 tablet (1,000 mcg total) by mouth once daily.      diclofenac sodium 1 % Gel  Commonly known as: VOLTAREN  Apply 2 g topically 4 (four) times daily as needed (area of pain).     diphenoxylate-atropine 2.5-0.025 mg 2.5-0.025 mg per tablet  Commonly known as: LOMOTIL  TAKE 1 TABLET BY MOUTH FOUR TIMES DAILY AS NEEDED     losartan 100 MG tablet  Commonly known as: COZAAR  TAKE 1 TABLET (100 MG TOTAL) BY MOUTH ONCE DAILY.     magnesium oxide 400 mg (241.3 mg magnesium) tablet  Commonly known as: MAG-OX  Take 1 tablet (400 mg total) by mouth 2 (two) times daily.     metoprolol tartrate 25 MG tablet  Commonly known as: LOPRESSOR  TAKE 1 TABLET TWICE DAILY               Where to Get Your Medications        These medications were sent to [facility] [place] [facility] [address], [place] [address]      Hours: Mon-Fri, 8a-5:30p Phone: [phone]   LIDOcaine 5 %       You can get these medications from any pharmacy    Bring a paper prescription for each of these medications  oxyCODONE 10 mg Tab immediate release tablet          Thank you  Brice Ramirez, PharmD

## 2022-05-20 NOTE — PROGRESS NOTES
IP Liaison - Final Visit Note    Patient: Patito Domingo  MRN:  [id number]  Date of Service:  5/20/2022  Completed by:  MADHURI Matias    Reason for Visit   Patient presents with    IP Liaison Chart Review       Patient Summary     Discharge Date: 05/20/2022  Discharge telephone number/address: [phone] / [address] [place] [address]  Follow up provider: Ervin Parikh MD  Follow up appointments: 6/1/2022 @ 9:30am  Home Health agency & telephone number: [facility]  DME ordered &  name: n/a  Assigned OPCM RN/SW: n/a  Report sent to follow up team (PCP/OPCM) via in basket message: n/a  Community Resources provided including agency name & contact info: [place] Assistance [place] drugs savings card      MADHURI Matias

## 2022-05-23 ENCOUNTER — TELEPHONE (OUTPATIENT)
Dept: ADMINISTRATIVE | Facility: HOSPITAL | Age: 72
End: 2022-05-23
Payer: MEDICARE

## 2022-05-23 ENCOUNTER — PATIENT OUTREACH (OUTPATIENT)
Dept: ADMINISTRATIVE | Facility: HOSPITAL | Age: 72
End: 2022-05-23
Payer: MEDICARE

## 2022-05-23 ENCOUNTER — OUTPATIENT CASE MANAGEMENT (OUTPATIENT)
Dept: ADMINISTRATIVE | Facility: OTHER | Age: 72
End: 2022-05-23
Payer: MEDICARE

## 2022-05-23 VITALS — SYSTOLIC BLOOD PRESSURE: 124 MMHG | DIASTOLIC BLOOD PRESSURE: 60 MMHG

## 2022-05-23 NOTE — PROGRESS NOTES
Outpatient Care Management  Plan of Care Follow Up Visit    Patient: Patito Domingo  MRN: [id number]  Date of Service: 05/23/2022  Completed by: Marcie Thrasher RN  Referral Date: 02/10/2022  Program:     Reason for Visit   Patient presents with    OPCM Chart Review    Plan Of Care    Update Plan Of Care       Brief Summary:   Pt states she saw dr.Ramcharan Herbert, in the hospital and he indicates tumor is stable.  No need for office appt at this time     Pt is followed by care at home HILLARY Hermosillo NP  Message to anabel advising of pt status.  Neuroendocrine clinic  [phone]  Was recommended percutaneous nephrolithyonomy initially but pt condition is too high risk      May need to fu up with gi surgeon who did the surgery about the pneumobilia    Next steps from previous encounter  Case closure if no new needs deferred    Interventions  Chart reviewed  Reviewed upcoming appt schedule    Assess/review short term goals met       Next steps  Fu on message to shaniqua hermosillo np care at home  Fu on use of vit c, probiotics and methenamine  Fu on symptoms of uti and abd pain        Patient Summary     Involvement of Care:  Do I have permission to speak with other family members about your care?       Patient Reported Labs & Vitals:  1.  Any Patient Reported Labs & Vitals?     2.  Patient Reported Blood Pressure:     3.  Patient Reported Pulse:     4.  Patient Reported Weight (Kg):     5.  Patient Reported Blood Glucose (mg/dl):       Medical and social history was reviewed with patient and/or caregiver.     Clinical Assessment     Reviewed and provided basic information on available community resources for mental health, transportation, wellness resources, and palliative care programs with patient and/or caregiver.     Complex Care Plan     Care plan was discussed and completed today with input from patient and/or caregiver.    Patient Instructions     Instructions were provided via the [facility] resources and  are available for the patient to view on the patient portal.      Todays OPCM Self-Management Care Plan was developed with the patients/caregivers input and was based on identified barriers from todays assessment.  Goals were written today with the patient/caregiver and the patient has agreed to work towards these goals to improve his/her overall well-being. Patient verbalized understanding of the care plan, goals, and all of today's instructions. Encouraged patient/caregiver to communicate with his/her physician and health care team about health conditions and the treatment plan.  Provided my contact information today and encouraged patient/caregiver to call me with any questions as needed.

## 2022-05-29 ENCOUNTER — NURSE TRIAGE (OUTPATIENT)
Dept: ADMINISTRATIVE | Facility: CLINIC | Age: 72
End: 2022-05-29
Payer: MEDICARE

## 2022-05-29 PROCEDURE — 96374 THER/PROPH/DIAG INJ IV PUSH: CPT

## 2022-05-29 PROCEDURE — 99285 EMERGENCY DEPT VISIT HI MDM: CPT | Mod: 25

## 2022-05-29 PROCEDURE — 96361 HYDRATE IV INFUSION ADD-ON: CPT

## 2022-05-29 PROCEDURE — 96375 TX/PRO/DX INJ NEW DRUG ADDON: CPT

## 2022-05-30 ENCOUNTER — HOSPITAL ENCOUNTER (EMERGENCY)
Facility: HOSPITAL | Age: 72
Discharge: HOME | End: 2022-05-30
Attending: EMERGENCY MEDICINE
Payer: MEDICARE

## 2022-05-30 VITALS
OXYGEN SATURATION: 98 % | TEMPERATURE: 98 F | DIASTOLIC BLOOD PRESSURE: 76 MMHG | RESPIRATION RATE: 18 BRPM | BODY MASS INDEX: 27.32 KG/M2 | HEART RATE: 86 BPM | WEIGHT: 170 LBS | SYSTOLIC BLOOD PRESSURE: 144 MMHG | HEIGHT: 66 IN

## 2022-05-30 DIAGNOSIS — G89.3 CANCER-RELATED BREAKTHROUGH PAIN: ICD-10-CM

## 2022-05-30 DIAGNOSIS — R11.0 NAUSEA: Primary | ICD-10-CM

## 2022-05-30 LAB
ALBUMIN SERPL BCP-MCNC: 3.6 G/DL (ref 3.5–5.2)
ALP SERPL-CCNC: 145 U/L (ref 55–135)
ALT SERPL W/O P-5'-P-CCNC: 15 U/L (ref 10–44)
ANION GAP SERPL CALC-SCNC: 12 MMOL/L (ref 8–16)
AST SERPL-CCNC: 15 U/L (ref 10–40)
BACTERIA #/AREA URNS HPF: ABNORMAL /HPF
BASOPHILS # BLD AUTO: 0.05 K/UL (ref 0–0.2)
BASOPHILS NFR BLD: 0.8 % (ref 0–1.9)
BILIRUB SERPL-MCNC: 0.7 MG/DL (ref 0.1–1)
BILIRUB UR QL STRIP: NEGATIVE
BUN SERPL-MCNC: 13 MG/DL (ref 8–23)
CALCIUM SERPL-MCNC: 10 MG/DL (ref 8.7–10.5)
CHLORIDE SERPL-SCNC: 104 MMOL/L (ref 95–110)
CLARITY UR: CLEAR
CO2 SERPL-SCNC: 26 MMOL/L (ref 23–29)
COLOR UR: YELLOW
CREAT SERPL-MCNC: 0.9 MG/DL (ref 0.5–1.4)
DIFFERENTIAL METHOD: ABNORMAL
EOSINOPHIL # BLD AUTO: 0.1 K/UL (ref 0–0.5)
EOSINOPHIL NFR BLD: 1.3 % (ref 0–8)
ERYTHROCYTE [DISTWIDTH] IN BLOOD BY AUTOMATED COUNT: 14.9 % (ref 11.5–14.5)
EST. GFR  (AFRICAN AMERICAN): >60 ML/MIN/1.73 M^2
EST. GFR  (NON AFRICAN AMERICAN): >60 ML/MIN/1.73 M^2
GLUCOSE SERPL-MCNC: 104 MG/DL (ref 70–110)
GLUCOSE UR QL STRIP: NEGATIVE
HCT VFR BLD AUTO: 35.4 % (ref 37–48.5)
HGB BLD-MCNC: 10.7 G/DL (ref 12–16)
HGB UR QL STRIP: NEGATIVE
HYALINE CASTS #/AREA URNS LPF: 4 /LPF
IMM GRANULOCYTES # BLD AUTO: 0.02 K/UL (ref 0–0.04)
IMM GRANULOCYTES NFR BLD AUTO: 0.3 % (ref 0–0.5)
KETONES UR QL STRIP: NEGATIVE
LEUKOCYTE ESTERASE UR QL STRIP: ABNORMAL
LIPASE SERPL-CCNC: 17 U/L (ref 4–60)
LYMPHOCYTES # BLD AUTO: 1.2 K/UL (ref 1–4.8)
LYMPHOCYTES NFR BLD: 19.3 % (ref 18–48)
MCH RBC QN AUTO: 25.7 PG (ref 27–31)
MCHC RBC AUTO-ENTMCNC: 30.2 G/DL (ref 32–36)
MCV RBC AUTO: 85 FL (ref 82–98)
MICROSCOPIC COMMENT: ABNORMAL
MONOCYTES # BLD AUTO: 0.5 K/UL (ref 0.3–1)
MONOCYTES NFR BLD: 8.2 % (ref 4–15)
NEUTROPHILS # BLD AUTO: 4.3 K/UL (ref 1.8–7.7)
NEUTROPHILS NFR BLD: 70.1 % (ref 38–73)
NITRITE UR QL STRIP: NEGATIVE
NRBC BLD-RTO: 0 /100 WBC
PH UR STRIP: 6 [PH] (ref 5–8)
PLATELET # BLD AUTO: 284 K/UL (ref 150–450)
PMV BLD AUTO: 10.2 FL (ref 9.2–12.9)
POTASSIUM SERPL-SCNC: 3.6 MMOL/L (ref 3.5–5.1)
PROT SERPL-MCNC: 8 G/DL (ref 6–8.4)
PROT UR QL STRIP: NEGATIVE
RBC # BLD AUTO: 4.17 M/UL (ref 4–5.4)
RBC #/AREA URNS HPF: 1 /HPF (ref 0–4)
SODIUM SERPL-SCNC: 142 MMOL/L (ref 136–145)
SP GR UR STRIP: 1.01 (ref 1–1.03)
SQUAMOUS #/AREA URNS HPF: 1 /HPF
URATE SERPL-MCNC: 4.1 MG/DL (ref 2.4–5.7)
URN SPEC COLLECT METH UR: ABNORMAL
UROBILINOGEN UR STRIP-ACNC: NEGATIVE EU/DL
WBC # BLD AUTO: 6.1 K/UL (ref 3.9–12.7)
WBC #/AREA URNS HPF: 5 /HPF (ref 0–5)

## 2022-05-30 PROCEDURE — 80053 COMPREHEN METABOLIC PANEL: CPT | Performed by: EMERGENCY MEDICINE

## 2022-05-30 PROCEDURE — 85025 COMPLETE CBC W/AUTO DIFF WBC: CPT | Performed by: EMERGENCY MEDICINE

## 2022-05-30 PROCEDURE — 81000 URINALYSIS NONAUTO W/SCOPE: CPT | Performed by: EMERGENCY MEDICINE

## 2022-05-30 PROCEDURE — 63600175 PHARM REV CODE 636 W HCPCS: Performed by: EMERGENCY MEDICINE

## 2022-05-30 PROCEDURE — 25000003 PHARM REV CODE 250: Performed by: EMERGENCY MEDICINE

## 2022-05-30 PROCEDURE — 83690 ASSAY OF LIPASE: CPT | Performed by: EMERGENCY MEDICINE

## 2022-05-30 PROCEDURE — 84550 ASSAY OF BLOOD/URIC ACID: CPT | Performed by: EMERGENCY MEDICINE

## 2022-05-30 RX ORDER — OXYCODONE HYDROCHLORIDE 5 MG/1
10 TABLET ORAL ONCE
Status: COMPLETED | OUTPATIENT
Start: 2022-05-30 | End: 2022-05-30

## 2022-05-30 RX ORDER — DROPERIDOL 2.5 MG/ML
1.25 INJECTION, SOLUTION INTRAMUSCULAR; INTRAVENOUS ONCE
Status: DISCONTINUED | OUTPATIENT
Start: 2022-05-30 | End: 2022-05-30

## 2022-05-30 RX ORDER — HYDROMORPHONE HYDROCHLORIDE 1 MG/ML
0.5 INJECTION, SOLUTION INTRAMUSCULAR; INTRAVENOUS; SUBCUTANEOUS
Status: COMPLETED | OUTPATIENT
Start: 2022-05-30 | End: 2022-05-30

## 2022-05-30 RX ORDER — HYDROMORPHONE HYDROCHLORIDE 1 MG/ML
0.5 INJECTION, SOLUTION INTRAMUSCULAR; INTRAVENOUS; SUBCUTANEOUS
Status: DISCONTINUED | OUTPATIENT
Start: 2022-05-30 | End: 2022-05-30

## 2022-05-30 RX ORDER — DROPERIDOL 2.5 MG/ML
1.25 INJECTION, SOLUTION INTRAMUSCULAR; INTRAVENOUS
Status: COMPLETED | OUTPATIENT
Start: 2022-05-30 | End: 2022-05-30

## 2022-05-30 RX ADMIN — HYDROMORPHONE HYDROCHLORIDE 0.5 MG: 1 INJECTION, SOLUTION INTRAMUSCULAR; INTRAVENOUS; SUBCUTANEOUS at 02:05

## 2022-05-30 RX ADMIN — OXYCODONE 10 MG: 5 TABLET ORAL at 03:05

## 2022-05-30 RX ADMIN — DROPERIDOL 1.25 MG: 2.5 INJECTION, SOLUTION INTRAMUSCULAR; INTRAVENOUS at 02:05

## 2022-05-30 RX ADMIN — SODIUM CHLORIDE 1000 ML: 0.9 INJECTION, SOLUTION INTRAVENOUS at 01:05

## 2022-05-30 NOTE — ED PROVIDER NOTES
Encounter Date: 2022       History     Chief Complaint   Patient presents with    Nausea     Pt reports shes been having hot flashes and nausea for the past month. Pt reports hx of colon cancer, and liver cancer.      71 y.o. 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  Reports similar to when she was admitted previously.  No fever chills.  Complaining of weakness nausea and significant pain.  Is on active chemotherapy.  Came to the ER for further evaluation.        Review of patient's allergies indicates:   Allergen Reactions    Contrast media Hives, Itching and Swelling    Epinephrine Anaphylaxis     Can cause  a Carcinoid Crisis    Ibuprofen Hives, Itching and Swelling    Zofran [ondansetron hcl] Itching     And multiple other reactions    Iodinated contrast media     Morphine Other (See Comments)    Sulfa (sulfonamide antibiotics) Hives, Itching and Swelling     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)      Past Surgical History:   Procedure Laterality Date    ABDOMINAL SURGERY      CATARACT EXTRACTION Left 10/2017     SECTION      CHOLECYSTECTOMY      COLON SURGERY      cystoscope      CYSTOSCOPY W/ RETROGRADES Right 10/10/2019    Procedure: CYSTOSCOPY, WITH RETROGRADE PYELOGRAM;  Surgeon: Gen Isbell MD;  Location: [facility];  Service: Urology;  Laterality: Right;    ERCP N/A 2021    Procedure: ERCP (ENDOSCOPIC RETROGRADE CHOLANGIOPANCREATOGRAPHY);  Surgeon:  Jayjay Rivera MD;  Location: [address]);  Service: Endoscopy;  Laterality: N/A;    ERCP N/A 3/4/2022    Procedure: ERCP (ENDOSCOPIC RETROGRADE CHOLANGIOPANCREATOGRAPHY);  Surgeon: Roby Virgen MD;  [facility]  Service: Endoscopy;  Laterality: N/A;    EYE SURGERY      HYSTERECTOMY  5/1996    LITHOTRIPSY      LIVER BIOPSY  9/14    carcinoid    URETEROSCOPY Right 10/10/2019    Procedure: URETEROSCOPY;  Surgeon: Gen Isbell MD;  Location: [facility] OR;  Service: Urology;  Laterality: Right;    UTERINE FIBROID SURGERY       Family History   Problem Relation Age of Onset    Cancer Mother         unknown    Alzheimer's disease Father     Stroke Sister     No Known Problems Son     No Known Problems Son     No Known Problems Son     No Known Problems Son     Kidney disease Neg Hx      Social History     Tobacco Use    Smoking status: Never Smoker    Smokeless tobacco: Never Used   Substance Use Topics    Alcohol use: No     Alcohol/week: 0.0 standard drinks    Drug use: No     Review of Systems   Constitutional: Positive for fatigue.   Respiratory: Negative for shortness of breath.    Cardiovascular: Negative for chest pain.   Gastrointestinal: Positive for abdominal pain and nausea. Negative for vomiting.   Genitourinary: Negative for dysuria.   All other systems reviewed and are negative.      Physical Exam     Initial Vitals [05/29/22 2327]   BP Pulse Resp Temp SpO2   (!) 142/86 84 20 98.1 °F (36.7 °C) 98 %      MAP       --         Physical Exam    Vitals reviewed.  Constitutional:   Elderly chronically ill-appearing no distress   HENT:   Head: Normocephalic and atraumatic.   Eyes: Pupils are equal, round, and reactive to light.   Neck:   Normal range of motion.  Cardiovascular: Normal rate and regular rhythm.   Pulmonary/Chest: Breath sounds normal. No respiratory distress.   Abdominal:   Extensive postsurgical changes noted diffuse abdominal tenderness without  lateralization   Musculoskeletal:         General: Normal range of motion.      Cervical back: Normal range of motion.     Neurological: She is alert and oriented to person, place, and time. She has normal strength. GCS score is 15. GCS eye subscore is 4. GCS verbal subscore is 5. GCS motor subscore is 6.   Skin: Skin is warm and dry. Capillary refill takes less than 2 seconds.   Psychiatric: She has a normal mood and affect. Thought content normal.         ED Course   Procedures  Labs Reviewed   CBC W/ AUTO DIFFERENTIAL - Abnormal; Notable for the following components:       Result Value    Hemoglobin 10.7 (*)     Hematocrit 35.4 (*)     MCH 25.7 (*)     MCHC 30.2 (*)     RDW 14.9 (*)     All other components within normal limits   COMPREHENSIVE METABOLIC PANEL - Abnormal; Notable for the following components:    Alkaline Phosphatase 145 (*)     All other components within normal limits   URINALYSIS, REFLEX TO URINE CULTURE - Abnormal; Notable for the following components:    Leukocytes, UA Trace (*)     All other components within normal limits    Narrative:     Specimen Source->Urine   URINALYSIS MICROSCOPIC - Abnormal; Notable for the following components:    Hyaline Casts, UA 4 (*)     All other components within normal limits    Narrative:     Specimen Source->Urine   URIC ACID   LIPASE          Imaging Results           CT Abdomen Pelvis  Without Contrast (Final result)  Result time 05/30/22 03:40:50    Final result by Eleanor Shipley MD (05/30/22 03:40:50)                 Impression:      1. Redemonstration of pneumobilia with intra and extrahepatic biliary ductal dilatation which appears similar to prior examinations in this patient with cholecystectomy.  Further evaluation with MRCP/ERCP as clinically warranted.  2. Postoperative change of partial colectomy with anastomotic suture line involving the transverse colon in the mid abdomen.  There is increased gaseous distension of the remaining colon which  contains liquid stool.  Findings could reflect underlying diarrheal illness.  Clinical correlation and further evaluation as warranted.  3. Redemonstration of calcified intra-abdominal mass and hepatic lesions demonstrated to better extent on prior MRI exams.  Findings in keeping with reported history of carcinoid and hepatic metastatic disease.  4. Prominent/mildly enlarged cardiophrenic lymph nodes, unchanged.  Prominent periaortic lymph nodes.  5. Bilateral renal calculi without evidence of overt hydronephrosis.  6. Multiple additional findings as discussed above.  This report was flagged in Epic as abnormal.      Electronically signed by: Eleanor Shipley MD  Date:    05/30/2022  Time:    03:40             Narrative:    EXAMINATION:  CT ABDOMEN PELVIS WITHOUT CONTRAST    CLINICAL HISTORY:  Abdominal pain, acute, nonlocalized;    TECHNIQUE:  Low dose axial images, sagittal and coronal reformations were obtained from the lung bases to the pubic symphysis.  No IV or oral contrast.    COMPARISON:  MRCP 05/16/2022, CT abdomen and pelvis 05/15/2022, 04/30/2022    FINDINGS:  The visualized lung bases are free of pleural fluid or focal consolidation with dependent bibasilar atelectasis. The visualized portions of the heart and pericardium are within normal limits.  There are prominent/mildly enlarged cardiophrenic lymph nodes present measuring up to 1.1 cm in short axis diameter (axial series 2, images 17-26).    Please note evaluation of solid organ parenchyma is limited due to lack of IV contrast.  The liver once again demonstrates several calcified lesions noting these are unchanged from prior examination and calcified to better extent on prior MRI examinations and concerning for hepatic metastatic disease.  There is persistent pneumobilia present most pronounced within the left hepatic lobe.  There is continued intra and extrahepatic biliary ductal dilatation, unchanged compared to prior exams.  The pancreas age  atrophic with a nonspecific hypodensity in the pancreatic tail seen on prior MRI and CT examinations.  No peripancreatic fat stranding or inflammatory change.  Spleen and adrenal glands demonstrate an unremarkable noncontrast CT appearance.    The kidneys are normal in size and location and demonstrate multiple bilateral renal calculi including large calculi in the inferior poles of the kidneys, unchanged.  There is a right extrarenal pelvis.  No evidence of overt hydronephrosis.  No hydroureter.  The urinary bladder is distended with smooth margins.  The uterus is surgically absent.    The abdominal aorta is nonaneurysmal.  There are shotty and minimally prominent periaortic lymph nodes present.    There is postoperative change of partial colectomy with presumed anastomotic suture in the mid abdomen involving the transverse colon.  Note is made of gaseous distension of the colon at the level of the anastomosis which appears similar to prior examination.  There is liquid stool throughout the remaining colon.  The visualized loops of small bowel demonstrate no evidence of obstruction or inflammatory change.  There is redemonstration of a 5.2 cm partially calcified mass within the anterior right abdomen (axial series 2, image 87) with adjacent enlarged lymph nodes.  No free intraperitoneal air or ascites identified.    The visualized osseous structures are intact with degenerative change.                                 Medications   sodium chloride 0.9% bolus 1,000 mL (1,000 mLs Intravenous New Bag 5/30/22 0159)   HYDROmorphone injection 0.5 mg (0.5 mg Intravenous Given 5/30/22 0200)   droperidoL injection 1.25 mg (1.25 mg Intravenous Given 5/30/22 0201)   oxyCODONE immediate release tablet 10 mg (10 mg Oral Given 5/30/22 0355)     Medical Decision Making:   Initial Assessment:   71-year-old female multiple medical problems active cancer patient presents the ER for evaluation of intractable abdominal pain.  Has had  this pain before.  Will plan blood work symptomatic support CT scan reassess.             ED Course as of 05/30/22 0415   Mon May 30, 2022   0402 Resting comfortably in bed no acute distress patient feeling better.  No longer nauseous or having pain.  CT abdomen pelvis reviewed.  Persistent findings based on previous CT scans no acute significant change.  Persistent pneumobilia.  No sign of infection no electrolyte abnormality.  Discussed with patient will plan discharge home return precautions primary care follow-up.  She reports she does not need to refill her either medications. [SE]      ED Course User Index  [SE] Jaclyn Villalta MD             Clinical Impression:   Final diagnoses:  [R11.0] Nausea (Primary)  [G89.3] Cancer-related breakthrough pain          ED Disposition Condition    Discharge Stable        ED Prescriptions     None        Follow-up Information     Follow up With Specialties Details Why Contact Info    Yasir Myers Jr., MD Family Medicine Schedule an appointment as soon as possible for a visit  As needed [address] [address]  [phone]             Jaclyn Villalta MD  05/30/22 0415

## 2022-05-30 NOTE — ED NOTES
Assumed care of pt from PANCHITO Delgado.     Pt lying in bed in no acute distress. Call bell in reach and bed in lowest position. Pt instructed to press call bell for any needs/concerns. Nursing staff will continue to monitor.

## 2022-05-30 NOTE — ED NOTES
Patient updated on transport delay. Patient assisted to the restroom with walker. No distress noted. Respirations are even and non labored. Skin is warm and dry. Will continue to monitor.

## 2022-05-31 ENCOUNTER — TELEPHONE (OUTPATIENT)
Dept: NEUROLOGY | Facility: HOSPITAL | Age: 72
End: 2022-05-31
Payer: MEDICARE

## 2022-05-31 ENCOUNTER — EXTERNAL CHRONIC CARE MANAGEMENT (OUTPATIENT)
Dept: PRIMARY CARE CLINIC | Facility: CLINIC | Age: 72
End: 2022-05-31
Payer: MEDICARE

## 2022-05-31 PROCEDURE — 99489 PR COMPLX CHRON CARE MGMT, EA ADDTL 30 MIN, PER MONTH: ICD-10-PCS | Mod: S$GLB,,, | Performed by: INTERNAL MEDICINE

## 2022-05-31 PROCEDURE — 99487 PR COMPLX CHRON CARE MGMT, 1ST HR, PER MONTH: ICD-10-PCS | Mod: S$GLB,,, | Performed by: INTERNAL MEDICINE

## 2022-05-31 PROCEDURE — 99487 CPLX CHRNC CARE 1ST 60 MIN: CPT | Mod: S$GLB,,, | Performed by: INTERNAL MEDICINE

## 2022-05-31 PROCEDURE — 99489 CPLX CHRNC CARE EA ADDL 30: CPT | Mod: S$GLB,,, | Performed by: INTERNAL MEDICINE

## 2022-05-31 NOTE — TELEPHONE ENCOUNTER
Marcie with Case Management requesting hospital follow up appt.  Pt missed last appt due to inpatient status.  Appt scheduled on Thursday, June 9, 2022 at 940am.

## 2022-05-31 NOTE — TELEPHONE ENCOUNTER
Spoke with patient, educated that we can discuss oncologist needs at her appointment with Dr. Perez, and could possibly have her see someone here in [place]. Patient verbalized her understanding.

## 2022-05-31 NOTE — TELEPHONE ENCOUNTER
----- Message from Marcie Thrasher RN sent at 5/31/2022 11:21 AM CDT -----  Pt will need a referral to oncology in the [facility].  She was seeing Ammon Bishop on the [place] but she cannot go to the [facility] any more.    Thank you   Marcie Thrasher RN,[facility]  Outpatient Complex Case Management  [phone]

## 2022-06-01 ENCOUNTER — OFFICE VISIT (OUTPATIENT)
Dept: UROLOGY | Facility: CLINIC | Age: 72
End: 2022-06-01
Payer: MEDICARE

## 2022-06-01 VITALS — DIASTOLIC BLOOD PRESSURE: 66 MMHG | SYSTOLIC BLOOD PRESSURE: 101 MMHG | HEART RATE: 55 BPM

## 2022-06-01 DIAGNOSIS — N39.0 RECURRENT UTI: Primary | ICD-10-CM

## 2022-06-01 DIAGNOSIS — N20.0 STAGHORN CALCULUS: ICD-10-CM

## 2022-06-01 PROCEDURE — 1111F DSCHRG MED/CURRENT MED MERGE: CPT | Mod: CPTII,S$GLB,, | Performed by: UROLOGY

## 2022-06-01 PROCEDURE — 1160F RVW MEDS BY RX/DR IN RCRD: CPT | Mod: CPTII,S$GLB,, | Performed by: UROLOGY

## 2022-06-01 PROCEDURE — 3074F PR MOST RECENT SYSTOLIC BLOOD PRESSURE < 130 MM HG: ICD-10-PCS | Mod: CPTII,S$GLB,, | Performed by: UROLOGY

## 2022-06-01 PROCEDURE — 4010F PR ACE/ARB THEARPY RXD/TAKEN: ICD-10-PCS | Mod: CPTII,S$GLB,, | Performed by: UROLOGY

## 2022-06-01 PROCEDURE — 3044F HG A1C LEVEL LT 7.0%: CPT | Mod: CPTII,S$GLB,, | Performed by: UROLOGY

## 2022-06-01 PROCEDURE — 3074F SYST BP LT 130 MM HG: CPT | Mod: CPTII,S$GLB,, | Performed by: UROLOGY

## 2022-06-01 PROCEDURE — 99214 PR OFFICE/OUTPT VISIT, EST, LEVL IV, 30-39 MIN: ICD-10-PCS | Mod: S$GLB,,, | Performed by: UROLOGY

## 2022-06-01 PROCEDURE — 1125F PR PAIN SEVERITY QUANTIFIED, PAIN PRESENT: ICD-10-PCS | Mod: CPTII,S$GLB,, | Performed by: UROLOGY

## 2022-06-01 PROCEDURE — 99214 OFFICE O/P EST MOD 30 MIN: CPT | Mod: S$GLB,,, | Performed by: UROLOGY

## 2022-06-01 PROCEDURE — 99999 PR PBB SHADOW E&M-EST. PATIENT-LVL III: CPT | Mod: PBBFAC,,, | Performed by: UROLOGY

## 2022-06-01 PROCEDURE — 1159F PR MEDICATION LIST DOCUMENTED IN MEDICAL RECORD: ICD-10-PCS | Mod: CPTII,S$GLB,, | Performed by: UROLOGY

## 2022-06-01 PROCEDURE — 1159F MED LIST DOCD IN RCRD: CPT | Mod: CPTII,S$GLB,, | Performed by: UROLOGY

## 2022-06-01 PROCEDURE — 4010F ACE/ARB THERAPY RXD/TAKEN: CPT | Mod: CPTII,S$GLB,, | Performed by: UROLOGY

## 2022-06-01 PROCEDURE — 1160F PR REVIEW ALL MEDS BY PRESCRIBER/CLIN PHARMACIST DOCUMENTED: ICD-10-PCS | Mod: CPTII,S$GLB,, | Performed by: UROLOGY

## 2022-06-01 PROCEDURE — 3044F PR MOST RECENT HEMOGLOBIN A1C LEVEL <7.0%: ICD-10-PCS | Mod: CPTII,S$GLB,, | Performed by: UROLOGY

## 2022-06-01 PROCEDURE — 1111F PR DISCHARGE MEDS RECONCILED W/ CURRENT OUTPATIENT MED LIST: ICD-10-PCS | Mod: CPTII,S$GLB,, | Performed by: UROLOGY

## 2022-06-01 PROCEDURE — 3078F DIAST BP <80 MM HG: CPT | Mod: CPTII,S$GLB,, | Performed by: UROLOGY

## 2022-06-01 PROCEDURE — 1125F AMNT PAIN NOTED PAIN PRSNT: CPT | Mod: CPTII,S$GLB,, | Performed by: UROLOGY

## 2022-06-01 PROCEDURE — 99999 PR PBB SHADOW E&M-EST. PATIENT-LVL III: ICD-10-PCS | Mod: PBBFAC,,, | Performed by: UROLOGY

## 2022-06-01 PROCEDURE — 3078F PR MOST RECENT DIASTOLIC BLOOD PRESSURE < 80 MM HG: ICD-10-PCS | Mod: CPTII,S$GLB,, | Performed by: UROLOGY

## 2022-06-01 RX ORDER — IBUPROFEN 100 MG/5ML
1000 SUSPENSION, ORAL (FINAL DOSE FORM) ORAL 2 TIMES DAILY
Qty: 60 TABLET | Refills: 5 | COMMUNITY
Start: 2022-06-01 | End: 2022-11-28

## 2022-06-01 RX ORDER — METHENAMINE HIPPURATE 1000 MG/1
1 TABLET ORAL 2 TIMES DAILY
Qty: 60 TABLET | Refills: 6 | Status: SHIPPED | OUTPATIENT
Start: 2022-06-01 | End: 2023-03-07

## 2022-06-01 NOTE — PROGRESS NOTES
Subjective:       Patient ID: Patito Domingo is a 71 y.o. female.    Chief Complaint: No chief complaint on file.     This is a 71 y.o.  female patient that is an established patient of mine. She is here to follow up after [facility] hospital admission.  Noted to have abd pain at the time, CT showed chronic bilateral lower pole stones and mild hydronephrosis.  Pain improved and hydroenephrosis resolved on CHARY.  Currently baseline abdominal pain.  Some vaginal irritation c/w yeast infection after antibiotic.  No dysuria, no hematuria.  No N/V/F/C. Long h/o UTIs.  Seen by Dr. Marques in past and noted to have chronic lower pole stones and intervention was not recommended given would required PCNL and risks of this.  She has had stone treatment in past.     3/18/22:  Follow up CHARY showed no hydronephrosis, recent choledochalithiasis and ERCP to removed, CT before and after shoed no hydronephrosis and stable renal stones    6/1/22:  Recently admitted to the hospital with abdominal pain found to have multidrug resistant urinary tract infection, of note this was not a straight catheterized urine.  She was given antibiotics and noted improvement in her abdominal pain.  She has also noted have pneumobilia on CT scan.  She recent was emergency department 5/30 in CT showed stable findings.  I did have extensive discussion with Dr. Sánchez and his team regarding her bilateral nephrolithiasis that is chronic.  I doubt this is the cause of her continuing abdominal pain or recurrent UTI his as she has had this for now decades.    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)        Past Surgical History:   Procedure Laterality Date    ABDOMINAL SURGERY      CATARACT EXTRACTION Left 10/2017     SECTION      CHOLECYSTECTOMY      COLON SURGERY      cystoscope      CYSTOSCOPY W/ RETROGRADES Right 10/10/2019    Procedure: CYSTOSCOPY, WITH RETROGRADE PYELOGRAM;  Surgeon: Gen Isbell MD;  [facility]: [facility] OR;  Service: Urology;  Laterality: Right;    ERCP N/A 2021    Procedure: ERCP (ENDOSCOPIC RETROGRADE CHOLANGIOPANCREATOGRAPHY);  Surgeon: Jayajy Rivera MD;  Location: [facility] ENDO ([address] [place] [address]);  Service: Endoscopy;  Laterality: N/A;    ERCP N/A 3/4/2022    Procedure: ERCP (ENDOSCOPIC RETROGRADE CHOLANGIOPANCREATOGRAPHY);  Surgeon: Roby Virgen MD;  Location: [facility] ([address] [place] [address]);  Service: Endoscopy;  Laterality: N/A;    EYE SURGERY      HYSTERECTOMY  1996    LITHOTRIPSY      LIVER BIOPSY      carcinoid    URETEROSCOPY Right 10/10/2019    Procedure: URETEROSCOPY;  Surgeon: Gen Isbell MD;  Location: [facility];  Service: Urology;  Laterality: Right;    UTERINE FIBROID SURGERY         Family History   Problem Relation Age of Onset    Cancer Mother         unknown    Alzheimer's disease Father     Stroke Sister     No Known Problems Son     No Known Problems Son     No Known Problems Son     No Known Problems Son     Kidney disease Neg Hx        Social History     Tobacco Use    Smoking status: Never Smoker    Smokeless tobacco: Never Used   Substance Use Topics    Alcohol use: No     Alcohol/week: 0.0 standard drinks    Drug use: No       Current Outpatient Medications on File Prior to Visit   Medication Sig Dispense Refill    amLODIPine (NORVASC) 5 MG tablet Take 1 tablet (5 mg total) by mouth once daily. 30 tablet 11    atorvastatin (LIPITOR) 40 MG tablet Take 1 tablet (40 mg total) by mouth every evening. 90 tablet 3    cyanocobalamin  (VITAMIN B-12) 1000 MCG tablet Take 1 tablet (1,000 mcg total) by mouth once daily. 30 tablet 5    diclofenac sodium (VOLTAREN) 1 % Gel Apply 2 g topically 4 (four) times daily as needed (area of pain). 100 g 0    diphenoxylate-atropine 2.5-0.025 mg (LOMOTIL) 2.5-0.025 mg per tablet TAKE 1 TABLET BY MOUTH FOUR TIMES DAILY AS NEEDED 20 tablet 0    LIDOcaine (LIDODERM) 5 % Place 1 patch onto the skin once daily. Remove & Discard patch within 12 hours or as directed by MD 15 patch 0    losartan (COZAAR) 100 MG tablet TAKE 1 TABLET (100 MG TOTAL) BY MOUTH ONCE DAILY. 90 tablet 0    magnesium oxide (MAG-OX) 400 mg (241.3 mg magnesium) tablet Take 1 tablet (400 mg total) by mouth 2 (two) times daily. 60 tablet 1    meclizine (ANTIVERT) 25 mg tablet TAKE 1 TABLET(25 MG) BY MOUTH THREE TIMES DAILY AS NEEDED FOR DIZZINESS (Patient taking differently: Take 25 mg by mouth 3 (three) times daily as needed for Dizziness.) 30 tablet 0    metoprolol tartrate (LOPRESSOR) 25 MG tablet TAKE 1 TABLET TWICE DAILY 180 tablet 0    oxyCODONE (ROXICODONE) 10 mg Tab immediate release tablet Take 1 tablet (10 mg total) by mouth every 6 (six) hours as needed for Pain. 10 tablet 0    oxyCODONE (ROXICODONE) 15 MG Tab Take 15 mg by mouth every 4 (four) hours as needed (chronic abdominal pain, malignancy related).       No current facility-administered medications on file prior to visit.       Review of patient's allergies indicates:   Allergen Reactions    Contrast media Hives, Itching and Swelling    Epinephrine Anaphylaxis     Can cause  a Carcinoid Crisis    Ibuprofen Hives, Itching and Swelling    Zofran [ondansetron hcl] Itching     And multiple other reactions    Iodinated contrast media     Morphine Other (See Comments)    Sulfa (sulfonamide antibiotics) Hives, Itching and Swelling       Review of Systems   Constitutional: Negative for activity change, chills, fatigue and fever.   Respiratory: Negative for cough, chest  tightness and shortness of breath.    Cardiovascular: Negative for chest pain.   Gastrointestinal: Positive for abdominal pain (chronic). Negative for abdominal distention, nausea and vomiting.   Genitourinary: Negative for difficulty urinating, flank pain, hematuria, pelvic pain and vaginal pain.   Musculoskeletal: Negative for back pain and gait problem.       Objective:      Physical Exam  HENT:      Head: Normocephalic.   Cardiovascular:      Pulses: Normal pulses.   Pulmonary:      Effort: Pulmonary effort is normal.   Abdominal:      General: Abdomen is flat. There is no distension.      Palpations: Abdomen is soft.      Tenderness: There is no abdominal tenderness. There is no right CVA tenderness, left CVA tenderness or guarding.   Musculoskeletal:      Cervical back: Normal range of motion.   Skin:     General: Skin is warm.   Neurological:      General: No focal deficit present.      Mental Status: She is alert.         Assessment:     Problem Noted   Bilateral Nephrolithiasis 9/17/2019    Bilateral lower poles that are chronic (followed by Dr. Marques and did not recommend intervention given would need PCNL and risks)  --recurrent UTI  -- abd pain       Abdominal Pain    Kidney Stone 12/1/2014    Stable chronic      Staghorn Calculus 10/15/2020    Chronic stable, follows with [facility] urology     Recurrent Uti 4/28/2018       Plan:     1.  Again reviwed findings of large right lower pole staghorn stone as well as left lower pole stone.  I discussed with her that I a.m. unsure if these were the cause of her recurrent urinary tract infections.  I doubt these were the cause of her chronic abdominal pain.  In order to clear all stones which would be required to try to alleviate recurrent urinary tract infection she would require right percutaneous nephrolithotomy as well as the left possible percutaneous nephrolithotomy versus ureteroscopy.  This would be extensive operation on the right and then require a follow-up  procedure on the left.  The risks benefits alternatives of procedure were discussed.  I discussed with her that I feel that she is likely high risk for this and may take multiple procedures on both sides to have complete stone clearance.  Given the I am unsure if this is the cause of her UTIs or the source of her abdominal pain, and the high risk of the operations, I do not think intervention is in her best interest. She reports past urologist had similar conclusions on chronic stones.  Currently, she does not want to undergo intervention.   2.  Start methenamine and vitamin C to possibly decrease rUTIs.    3. Follow up in 3 months, sooner if needed.      Ervin Parikh MD

## 2022-06-02 RX ORDER — LIDOCAINE 50 MG/G
1 PATCH TOPICAL DAILY
Qty: 15 PATCH | Refills: 2 | Status: SHIPPED | OUTPATIENT
Start: 2022-06-02 | End: 2022-06-30 | Stop reason: SDUPTHER

## 2022-06-03 ENCOUNTER — TELEPHONE (OUTPATIENT)
Dept: NEUROLOGY | Facility: HOSPITAL | Age: 72
End: 2022-06-03

## 2022-06-03 NOTE — TELEPHONE ENCOUNTER
"Pt requesting to obtain a couple of pain tablets until she receives her refill from pain management.  Pt states "I just need a few pills. Pt advised to contact pain management for assistance.   Pt notified she has appt scheduled with Dr. Perry on 6/9/22.   Request also forward to Dr. Perry.  "

## 2022-06-03 NOTE — TELEPHONE ENCOUNTER
----- Message from Judith Grimes, Patient Care Assistant sent at 6/3/2022 10:04 AM CDT -----  Type:  Needs Medical Advice    Who Called:  pt  (please be specific):  Patient would like a call back regarding the problem she is having with her stomach again and she don't want to go the ER again   Would the patient rather a call back or a response via [facility]?  Please call  Best Call Back Number:  [phone]   Additional Information:

## 2022-06-06 NOTE — TELEPHONE ENCOUNTER
Spoke with patient.  Refills will be sent to [facility] and Heather will make her an appointment.  She will be notified of when. Verbalized understanding.

## 2022-06-06 NOTE — TELEPHONE ENCOUNTER
----- Message from Corbin Preston sent at 6/6/2022  3:35 PM CDT -----  Contact: pt  Type:  Sooner Apoointment Request    Caller is requesting a sooner appointment.  Caller declined first available appointment listed below.  Caller will not accept being placed on the waitlist and is requesting a message be sent to doctor.  Name of Caller:pt  When is the first available appointment? N/a  Symptoms:f/u  Would the patient rather a call back or a response via [facility]? call  Best Call Back Number:[phone]  Additional Information:        Type:  RX Refill Request    Who Called: pt  Refill or New Rx:refill  RX Name and Strength:diclofenac sodium (VOLTAREN) 1 % Gel  How is the patient currently taking it? (ex. 1XDay):Apply 2 g topically 4 (four) times daily as needed (area of pain). - Topical (Top)  Is this a 30 day or 90 day RX: 100g    Refill or New Rx:refill  RX Name and Strength:diphenoxylate-atropine 2.5-0.025 mg (LOMOTIL) 2.5-0.025 mg per tablet  How is the patient currently taking it? (ex. 1XDay):TAKE 1 TABLET BY MOUTH FOUR TIMES DAILY AS NEEDED  Is this a 30 day or 90 day RX: 20 tablet    Preferred Pharmacy with phone number:[id number] - [facility] [address] [address] AT [facility] [address]   Phone: [phone]  Fax:  [phone]    Local or Mail Order: local  Ordering Provider: Yasir Myers Jr., MD  Would the patient rather a call back or a response via [facility]? call  Best Call Back Number::[phone]  Additional Information:

## 2022-06-07 RX ORDER — DICLOFENAC SODIUM 10 MG/G
2 GEL TOPICAL 4 TIMES DAILY PRN
Qty: 100 G | Refills: 0 | Status: SHIPPED | OUTPATIENT
Start: 2022-06-07 | End: 2022-06-21 | Stop reason: SDUPTHER

## 2022-06-07 RX ORDER — DIPHENOXYLATE HYDROCHLORIDE AND ATROPINE SULFATE 2.5; .025 MG/1; MG/1
1 TABLET ORAL 4 TIMES DAILY PRN
Qty: 20 TABLET | Refills: 0 | Status: ON HOLD | OUTPATIENT
Start: 2022-06-07 | End: 2022-07-11 | Stop reason: HOSPADM

## 2022-06-08 ENCOUNTER — OUTPATIENT CASE MANAGEMENT (OUTPATIENT)
Dept: ADMINISTRATIVE | Facility: OTHER | Age: 72
End: 2022-06-08
Payer: MEDICARE

## 2022-06-08 ENCOUNTER — TELEPHONE (OUTPATIENT)
Dept: FAMILY MEDICINE | Facility: CLINIC | Age: 72
End: 2022-06-08
Payer: MEDICARE

## 2022-06-08 DIAGNOSIS — C7B.00 METASTATIC CARCINOID TUMOR: Primary | ICD-10-CM

## 2022-06-08 DIAGNOSIS — R10.9 ABDOMINAL PAIN, UNSPECIFIED ABDOMINAL LOCATION: ICD-10-CM

## 2022-06-08 NOTE — PROGRESS NOTES
Outpatient Care Management  Plan of Care Follow Up Visit    Patient: Patito Domingo  MRN: [id number]  Date of Service: 06/08/2022  Completed by: Marcie Thrasher RN  Referral Date: 02/10/2022  Program:     Reason for Visit   Patient presents with    OPCM Chart Review    Follow-up    Update Plan Of Care       Brief Summary:       Next steps from previous encounter  Fu on message to shaniqua hermosillo np care at home done scheduled for july  Fu on use of vit c, probiotics and methenamine done  Fu on symptoms of uti and abd pain done    Interventions  Chart reviewed  Reviewed upcoming appt schedule    Assess/review short term goals met       Next steps  Fu on use of vit c , probiotics and methenamine  Fu on message to dr. borja done and request for gi placed  Fu on contact from gi  Fu on appt with neuroendocrine and recommendations for oncology  Fu on symptoms of uti        Patient Summary     Involvement of Care:  Do I have permission to speak with other family members about your care?       Patient Reported Labs & Vitals:  1.  Any Patient Reported Labs & Vitals?     2.  Patient Reported Blood Pressure:     3.  Patient Reported Pulse:     4.  Patient Reported Weight (Kg):     5.  Patient Reported Blood Glucose (mg/dl):       Medical and social history was reviewed with patient and/or caregiver.     Clinical Assessment     Reviewed and provided basic information on available community resources for mental health, transportation, wellness resources, and palliative care programs with patient and/or caregiver.     Complex Care Plan     Care plan was discussed and completed today with input from patient and/or caregiver.    Patient Instructions     Instructions were provided via the [employer] patient resources and are available for the patient to view on the patient portal.      Todays OPCM Self-Management Care Plan was developed with the patients/caregivers input and was based on identified barriers from todays assessment.   Goals were written today with the patient/caregiver and the patient has agreed to work towards these goals to improve his/her overall well-being. Patient verbalized understanding of the care plan, goals, and all of today's instructions. Encouraged patient/caregiver to communicate with his/her physician and health care team about health conditions and the treatment plan.  Provided my contact information today and encouraged patient/caregiver to call me with any questions as needed.

## 2022-06-08 NOTE — TELEPHONE ENCOUNTER
----- Message from Marcie Thrasher RN sent at 6/8/2022  8:48 AM CDT -----  Regarding: abdominal pain  Dr. Myers,    I am a  and working with Ms. Domingo.  She is still having abd pain and her ct scan continues to show pneumobilia.  She did have ERCP in march 2022.  From my chart review I do not see follow up with gastro.  She has followed up with urology for recurrent UTI and he has prescribed methenamine (HIPREX) 1 gram Tab.  Urology does not feel that the uti is causing abd pain.    Do you have any recommendation for a gastro doctor in [place]?  Please advise,  Marcie Thrasher RN,[facility]  Outpatient Complex Case Management  [phone]

## 2022-06-09 ENCOUNTER — OFFICE VISIT (OUTPATIENT)
Dept: NEUROLOGY | Facility: HOSPITAL | Age: 72
End: 2022-06-09
Attending: SURGERY
Payer: MEDICARE

## 2022-06-09 VITALS
BODY MASS INDEX: 28.4 KG/M2 | HEART RATE: 56 BPM | WEIGHT: 175.94 LBS | SYSTOLIC BLOOD PRESSURE: 170 MMHG | TEMPERATURE: 97 F | DIASTOLIC BLOOD PRESSURE: 93 MMHG

## 2022-06-09 DIAGNOSIS — R29.898 LEG WEAKNESS, BILATERAL: ICD-10-CM

## 2022-06-09 DIAGNOSIS — C7A.012 MALIGNANT CARCINOID TUMOR OF ILEUM: Primary | ICD-10-CM

## 2022-06-09 DIAGNOSIS — C7B.02 SECONDARY MALIGNANT CARCINOID TUMOR OF LIVER: ICD-10-CM

## 2022-06-09 DIAGNOSIS — C7B.01 SECONDARY CARCINOID TUMOR OF DISTANT LYMPH NODES: ICD-10-CM

## 2022-06-09 PROCEDURE — 99215 OFFICE O/P EST HI 40 MIN: CPT | Performed by: SURGERY

## 2022-06-09 RX ORDER — OXYCODONE AND ACETAMINOPHEN 10; 325 MG/1; MG/1
1 TABLET ORAL EVERY 4 HOURS PRN
Qty: 15 TABLET | Refills: 0 | Status: SHIPPED | OUTPATIENT
Start: 2022-06-09 | End: 2022-06-09 | Stop reason: SDUPTHER

## 2022-06-09 RX ORDER — OXYCODONE AND ACETAMINOPHEN 10; 325 MG/1; MG/1
1 TABLET ORAL EVERY 4 HOURS PRN
Qty: 15 TABLET | Refills: 0 | Status: SHIPPED | OUTPATIENT
Start: 2022-06-09 | End: 2022-06-21

## 2022-06-09 NOTE — PROGRESS NOTES
[facility]:  [facility]  A collaboration between [facility] and [facility]      PATIENT: Patito Domingo  MRN: [id number]  DATE: 2022    Subjective:      Chief Complaint: Follow-up (Hospital follow up )  Was recently discharged from the hospital after an episode of urine tract infection continues to be in weakness of the lower extremity and intermittent episodes of abdominal pain.  She was recently seen by urologist who recommended conservative management  Vitals: Blood pressure (!) 170/93, pulse (!) 56, temperature 97 °F (36.1 °C), temperature source Oral, weight 79.8 kg (175 lb 14.8 oz). -  ECOG Score: 1 - Symptomatic but completely ambulatory    Diagnosis: No diagnosis found.     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;  Service: Urology;  Laterality: Right;    ERCP N/A 2021    Procedure: ERCP (ENDOSCOPIC RETROGRADE CHOLANGIOPANCREATOGRAPHY);  Surgeon: Jayjay Rivera MD;  Location: [facility] ([address] [place] [address]);   Service: Endoscopy;  Laterality: N/A;    ERCP N/A 3/4/2022    Procedure: ERCP (ENDOSCOPIC RETROGRADE CHOLANGIOPANCREATOGRAPHY);  Surgeon: Roby Virgen MD;  Location: [address]);  Service: Endoscopy;  Laterality: N/A;    EYE SURGERY      HYSTERECTOMY  5/1996    LITHOTRIPSY      LIVER BIOPSY  9/14    carcinoid    URETEROSCOPY Right 10/10/2019    Procedure: URETEROSCOPY;  Surgeon: Gen Isbell MD;  Location[facility];  Service: Urology;  Laterality: Right;    UTERINE FIBROID SURGERY         Family History:  Family History   Problem Relation Age of Onset    Cancer Mother         unknown    Alzheimer's disease Father     Stroke Sister     No Known Problems Son     No Known Problems Son     No Known Problems Son     No Known Problems Son     Kidney disease Neg Hx        Allergies:  Contrast media, Epinephrine, Ibuprofen, Zofran [ondansetron hcl], Iodinated contrast media, Morphine, and Sulfa (sulfonamide antibiotics)    Medications:   Current Outpatient Medications   Medication Sig    amLODIPine (NORVASC) 5 MG tablet Take 1 tablet (5 mg total) by mouth once daily.    ascorbic acid, vitamin C, (VITAMIN C) 1000 MG tablet Take 1 tablet (1,000 mg total) by mouth 2 (two) times daily.    atorvastatin (LIPITOR) 40 MG tablet Take 1 tablet (40 mg total) by mouth every evening.    cyanocobalamin (VITAMIN B-12) 1000 MCG tablet Take 1 tablet (1,000 mcg total) by mouth once daily.    diclofenac sodium (VOLTAREN) 1 % Gel Apply 2 g topically 4 (four) times daily as needed (area of pain).    diphenoxylate-atropine 2.5-0.025 mg (LOMOTIL) 2.5-0.025 mg per tablet Take 1 tablet by mouth 4 (four) times daily as needed.    LIDOcaine (LIDODERM) 5 % Place 1 patch onto the skin once daily. Remove & Discard patch within 12 hours or as directed by MD    losartan (COZAAR) 100 MG tablet TAKE 1 TABLET (100 MG TOTAL) BY MOUTH ONCE DAILY.    magnesium oxide (MAG-OX) 400 mg (241.3 mg magnesium) tablet Take 1 tablet  (400 mg total) by mouth 2 (two) times daily.    meclizine (ANTIVERT) 25 mg tablet TAKE 1 TABLET(25 MG) BY MOUTH THREE TIMES DAILY AS NEEDED FOR DIZZINESS (Patient taking differently: Take 25 mg by mouth 3 (three) times daily as needed for Dizziness.)    methenamine (HIPREX) 1 gram Tab Take 1 tablet (1 g total) by mouth 2 (two) times daily.    metoprolol tartrate (LOPRESSOR) 25 MG tablet TAKE 1 TABLET TWICE DAILY    oxyCODONE (ROXICODONE) 10 mg Tab immediate release tablet Take 1 tablet (10 mg total) by mouth every 6 (six) hours as needed for Pain.    oxyCODONE (ROXICODONE) 15 MG Tab Take 15 mg by mouth every 4 (four) hours as needed (chronic abdominal pain, malignancy related).    oxyCODONE-acetaminophen (PERCOCET)  mg per tablet Take 1 tablet by mouth every 4 (four) hours as needed for Pain.     No current facility-administered medications for this visit.        Review of Systems   Constitutional: Positive for activity change, appetite change and fatigue. Negative for diaphoresis and unexpected weight change.   HENT: Negative for congestion, dental problem, ear pain, facial swelling, hearing loss, mouth sores, nosebleeds, postnasal drip, rhinorrhea, sinus pain, sneezing, sore throat, tinnitus, trouble swallowing and voice change.    Eyes: Negative for photophobia, pain, discharge, redness, itching and visual disturbance.   Respiratory: Negative for choking, chest tightness, shortness of breath, wheezing and stridor.    Cardiovascular: Negative for chest pain, palpitations and leg swelling.   Gastrointestinal: Negative for abdominal distention, abdominal pain, anal bleeding, constipation, diarrhea, nausea, rectal pain and vomiting.   Endocrine: Negative.    Genitourinary: Negative for difficulty urinating, dyspareunia, dysuria, enuresis and hematuria.   Musculoskeletal: Positive for arthralgias, back pain, gait problem, joint swelling, myalgias, neck pain and neck stiffness.   Skin: Negative for color  change, pallor, rash and wound.   Allergic/Immunologic: Negative.    Neurological: Positive for weakness. Negative for dizziness, syncope, facial asymmetry, speech difficulty, light-headedness, numbness and headaches.   Hematological: Negative for adenopathy. Does not bruise/bleed easily.   Psychiatric/Behavioral: Negative for agitation, behavioral problems and confusion.      Objective:      Physical Exam  Constitutional:       Appearance: She is normal weight.   HENT:      Head: Normocephalic.      Right Ear: External ear normal.      Left Ear: External ear normal.      Nose: Nose normal.      Mouth/Throat:      Mouth: Mucous membranes are moist.   Eyes:      Pupils: Pupils are equal, round, and reactive to light.   Neck:      Vascular: No carotid bruit.   Cardiovascular:      Rate and Rhythm: Normal rate and regular rhythm.      Pulses: Normal pulses.      Heart sounds: Normal heart sounds.   Pulmonary:      Effort: Pulmonary effort is normal.      Breath sounds: Normal breath sounds.   Abdominal:      General: Abdomen is flat.      Palpations: There is no mass.      Tenderness: There is no rebound.      Hernia: No hernia is present.   Musculoskeletal:         General: Normal range of motion.      Cervical back: Normal range of motion and neck supple. No rigidity or tenderness.   Lymphadenopathy:      Cervical: No cervical adenopathy.   Skin:     General: Skin is warm.   Neurological:      General: No focal deficit present.      Mental Status: She is alert.   Psychiatric:         Mood and Affect: Mood normal.        Assessment:       No diagnosis found.    Laboratory Data:       Scans:   CT Abdomen Pelvis  Without Contrast  Narrative: EXAMINATION:  CT ABDOMEN PELVIS WITHOUT CONTRAST    CLINICAL HISTORY:  Abdominal pain, acute, nonlocalized;    TECHNIQUE:  Low dose axial images, sagittal and coronal reformations were obtained from the lung bases to the pubic symphysis.  No IV or oral contrast.    COMPARISON:  MRCP  05/16/2022, CT abdomen and pelvis 05/15/2022, 04/30/2022    FINDINGS:  The visualized lung bases are free of pleural fluid or focal consolidation with dependent bibasilar atelectasis. The visualized portions of the heart and pericardium are within normal limits.  There are prominent/mildly enlarged cardiophrenic lymph nodes present measuring up to 1.1 cm in short axis diameter (axial series 2, images 17-26).    Please note evaluation of solid organ parenchyma is limited due to lack of IV contrast.  The liver once again demonstrates several calcified lesions noting these are unchanged from prior examination and calcified to better extent on prior MRI examinations and concerning for hepatic metastatic disease.  There is persistent pneumobilia present most pronounced within the left hepatic lobe.  There is continued intra and extrahepatic biliary ductal dilatation, unchanged compared to prior exams.  The pancreas age atrophic with a nonspecific hypodensity in the pancreatic tail seen on prior MRI and CT examinations.  No peripancreatic fat stranding or inflammatory change.  Spleen and adrenal glands demonstrate an unremarkable noncontrast CT appearance.    The kidneys are normal in size and location and demonstrate multiple bilateral renal calculi including large calculi in the inferior poles of the kidneys, unchanged.  There is a right extrarenal pelvis.  No evidence of overt hydronephrosis.  No hydroureter.  The urinary bladder is distended with smooth margins.  The uterus is surgically absent.    The abdominal aorta is nonaneurysmal.  There are shotty and minimally prominent periaortic lymph nodes present.    There is postoperative change of partial colectomy with presumed anastomotic suture in the mid abdomen involving the transverse colon.  Note is made of gaseous distension of the colon at the level of the anastomosis which appears similar to prior examination.  There is liquid stool throughout the remaining  colon.  The visualized loops of small bowel demonstrate no evidence of obstruction or inflammatory change.  There is redemonstration of a 5.2 cm partially calcified mass within the anterior right abdomen (axial series 2, image 87) with adjacent enlarged lymph nodes.  No free intraperitoneal air or ascites identified.    The visualized osseous structures are intact with degenerative change.  Impression: 1. Redemonstration of pneumobilia with intra and extrahepatic biliary ductal dilatation which appears similar to prior examinations in this patient with cholecystectomy.  Further evaluation with MRCP/ERCP as clinically warranted.  2. Postoperative change of partial colectomy with anastomotic suture line involving the transverse colon in the mid abdomen.  There is increased gaseous distension of the remaining colon which contains liquid stool.  Findings could reflect underlying diarrheal illness.  Clinical correlation and further evaluation as warranted.  3. Redemonstration of calcified intra-abdominal mass and hepatic lesions demonstrated to better extent on prior MRI exams.  Findings in keeping with reported history of carcinoid and hepatic metastatic disease.  4. Prominent/mildly enlarged cardiophrenic lymph nodes, unchanged.  Prominent periaortic lymph nodes.  5. Bilateral renal calculi without evidence of overt hydronephrosis.  6. Multiple additional findings as discussed above.  This report was flagged in Epic as abnormal.    Electronically signed by: Eleanor Shipley MD  Date:    05/30/2022  Time:    03:40       Impression:  Making good neuroendocrine tumor and sent the mass and metastatic disease in liver status post the right hemicolectomy, cholecystectomy, chemo embolization x2.  She lives alone by herself.  She is having issues with lower extremity weakness.  He has intermittent episodes of abdominal pain her diarrhea is well controlled.    She is not a candidate for the surgical debulking.  She also had issues  after the chemo embolization with pain control therefore she is not a candidate for any intervention.  She gets monthly lanreotide injection    Plan:       Continue monthly lanreotide    Follow-up with me in 3 months time with neuroendocrine tumor markers from a MRI of the abdomen.    Discussed with her after clinical examination for about 20-30 minutes talked about all future instructions and the long-term care    Follow-up with PCP who will address the pain medications and lower extremity weakness requiring her to have walker          AMY Perez MD, FACS   Associate Professor of Surgery, [facility]   Neuroendocrine Surgery, Hepatic/Pancreatic & General Surgery   [address]   [place] [address]   ph. [phone]; [phone]   fax. [phone]

## 2022-06-14 ENCOUNTER — OFFICE VISIT (OUTPATIENT)
Dept: GASTROENTEROLOGY | Facility: CLINIC | Age: 72
End: 2022-06-14
Payer: MEDICARE

## 2022-06-14 VITALS
WEIGHT: 185.69 LBS | SYSTOLIC BLOOD PRESSURE: 148 MMHG | BODY MASS INDEX: 29.97 KG/M2 | DIASTOLIC BLOOD PRESSURE: 88 MMHG

## 2022-06-14 DIAGNOSIS — K21.9 GASTROESOPHAGEAL REFLUX DISEASE, UNSPECIFIED WHETHER ESOPHAGITIS PRESENT: ICD-10-CM

## 2022-06-14 DIAGNOSIS — G89.3 CHRONIC PAIN DUE TO MALIGNANT NEOPLASTIC DISEASE: ICD-10-CM

## 2022-06-14 DIAGNOSIS — R10.13 EPIGASTRIC PAIN: Primary | ICD-10-CM

## 2022-06-14 DIAGNOSIS — C7B.00 METASTATIC CARCINOID TUMOR: ICD-10-CM

## 2022-06-14 PROCEDURE — 1125F PR PAIN SEVERITY QUANTIFIED, PAIN PRESENT: ICD-10-PCS | Mod: CPTII,S$GLB,, | Performed by: NURSE PRACTITIONER

## 2022-06-14 PROCEDURE — 4010F ACE/ARB THERAPY RXD/TAKEN: CPT | Mod: CPTII,S$GLB,, | Performed by: NURSE PRACTITIONER

## 2022-06-14 PROCEDURE — 1159F PR MEDICATION LIST DOCUMENTED IN MEDICAL RECORD: ICD-10-PCS | Mod: CPTII,S$GLB,, | Performed by: NURSE PRACTITIONER

## 2022-06-14 PROCEDURE — 1160F PR REVIEW ALL MEDS BY PRESCRIBER/CLIN PHARMACIST DOCUMENTED: ICD-10-PCS | Mod: CPTII,S$GLB,, | Performed by: NURSE PRACTITIONER

## 2022-06-14 PROCEDURE — 1101F PR PT FALLS ASSESS DOC 0-1 FALLS W/OUT INJ PAST YR: ICD-10-PCS | Mod: CPTII,S$GLB,, | Performed by: NURSE PRACTITIONER

## 2022-06-14 PROCEDURE — 3079F PR MOST RECENT DIASTOLIC BLOOD PRESSURE 80-89 MM HG: ICD-10-PCS | Mod: CPTII,S$GLB,, | Performed by: NURSE PRACTITIONER

## 2022-06-14 PROCEDURE — 3288F PR FALLS RISK ASSESSMENT DOCUMENTED: ICD-10-PCS | Mod: CPTII,S$GLB,, | Performed by: NURSE PRACTITIONER

## 2022-06-14 PROCEDURE — 3008F PR BODY MASS INDEX (BMI) DOCUMENTED: ICD-10-PCS | Mod: CPTII,S$GLB,, | Performed by: NURSE PRACTITIONER

## 2022-06-14 PROCEDURE — 1125F AMNT PAIN NOTED PAIN PRSNT: CPT | Mod: CPTII,S$GLB,, | Performed by: NURSE PRACTITIONER

## 2022-06-14 PROCEDURE — 3288F FALL RISK ASSESSMENT DOCD: CPT | Mod: CPTII,S$GLB,, | Performed by: NURSE PRACTITIONER

## 2022-06-14 PROCEDURE — 4010F PR ACE/ARB THEARPY RXD/TAKEN: ICD-10-PCS | Mod: CPTII,S$GLB,, | Performed by: NURSE PRACTITIONER

## 2022-06-14 PROCEDURE — 1111F PR DISCHARGE MEDS RECONCILED W/ CURRENT OUTPATIENT MED LIST: ICD-10-PCS | Mod: CPTII,S$GLB,, | Performed by: NURSE PRACTITIONER

## 2022-06-14 PROCEDURE — 3077F PR MOST RECENT SYSTOLIC BLOOD PRESSURE >= 140 MM HG: ICD-10-PCS | Mod: CPTII,S$GLB,, | Performed by: NURSE PRACTITIONER

## 2022-06-14 PROCEDURE — 1101F PT FALLS ASSESS-DOCD LE1/YR: CPT | Mod: CPTII,S$GLB,, | Performed by: NURSE PRACTITIONER

## 2022-06-14 PROCEDURE — 3077F SYST BP >= 140 MM HG: CPT | Mod: CPTII,S$GLB,, | Performed by: NURSE PRACTITIONER

## 2022-06-14 PROCEDURE — 3044F PR MOST RECENT HEMOGLOBIN A1C LEVEL <7.0%: ICD-10-PCS | Mod: CPTII,S$GLB,, | Performed by: NURSE PRACTITIONER

## 2022-06-14 PROCEDURE — 1111F DSCHRG MED/CURRENT MED MERGE: CPT | Mod: CPTII,S$GLB,, | Performed by: NURSE PRACTITIONER

## 2022-06-14 PROCEDURE — 1160F RVW MEDS BY RX/DR IN RCRD: CPT | Mod: CPTII,S$GLB,, | Performed by: NURSE PRACTITIONER

## 2022-06-14 PROCEDURE — 99999 PR PBB SHADOW E&M-EST. PATIENT-LVL IV: ICD-10-PCS | Mod: PBBFAC,,, | Performed by: NURSE PRACTITIONER

## 2022-06-14 PROCEDURE — 3008F BODY MASS INDEX DOCD: CPT | Mod: CPTII,S$GLB,, | Performed by: NURSE PRACTITIONER

## 2022-06-14 PROCEDURE — 99214 OFFICE O/P EST MOD 30 MIN: CPT | Mod: S$GLB,,, | Performed by: NURSE PRACTITIONER

## 2022-06-14 PROCEDURE — 99999 PR PBB SHADOW E&M-EST. PATIENT-LVL IV: CPT | Mod: PBBFAC,,, | Performed by: NURSE PRACTITIONER

## 2022-06-14 PROCEDURE — 99214 PR OFFICE/OUTPT VISIT, EST, LEVL IV, 30-39 MIN: ICD-10-PCS | Mod: S$GLB,,, | Performed by: NURSE PRACTITIONER

## 2022-06-14 PROCEDURE — 3044F HG A1C LEVEL LT 7.0%: CPT | Mod: CPTII,S$GLB,, | Performed by: NURSE PRACTITIONER

## 2022-06-14 PROCEDURE — 3079F DIAST BP 80-89 MM HG: CPT | Mod: CPTII,S$GLB,, | Performed by: NURSE PRACTITIONER

## 2022-06-14 PROCEDURE — 1159F MED LIST DOCD IN RCRD: CPT | Mod: CPTII,S$GLB,, | Performed by: NURSE PRACTITIONER

## 2022-06-14 RX ORDER — PANTOPRAZOLE SODIUM 40 MG/1
40 TABLET, DELAYED RELEASE ORAL DAILY
Qty: 30 TABLET | Refills: 5 | Status: SHIPPED | OUTPATIENT
Start: 2022-06-14 | End: 2022-06-24

## 2022-06-14 RX ORDER — DICYCLOMINE HYDROCHLORIDE 20 MG/1
20 TABLET ORAL 3 TIMES DAILY PRN
Qty: 60 TABLET | Refills: 2 | Status: SHIPPED | OUTPATIENT
Start: 2022-06-14 | End: 2022-06-14

## 2022-06-16 ENCOUNTER — DOCUMENT SCAN (OUTPATIENT)
Dept: HOME HEALTH SERVICES | Facility: HOSPITAL | Age: 72
End: 2022-06-16
Payer: MEDICARE

## 2022-06-16 NOTE — PROGRESS NOTES
Subjective:       Patient ID: Patito Domingo is a 71 y.o. female.    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 Perez), open cholecystectomy 2017, choledocho s/p ERCP and sphincterotomy 3/4/2022 presents to clinic with c/o intermittent abdominal pain    Abdominal Pain  This is a recurrent problem. The current episode started more than 1 month ago. The problem occurs intermittently. The pain is located in the epigastric region. The quality of the pain is aching and burning. Pain radiation: entire abdomen. Associated symptoms include nausea. Pertinent negatives include no belching, constipation, diarrhea, fever or vomiting. The pain is aggravated by eating and certain positions. The pain is relieved by nothing. She has tried oral narcotic analgesics for the symptoms. The treatment provided mild relief.       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)        Past Surgical History:   Procedure Laterality Date    ABDOMINAL SURGERY      CATARACT EXTRACTION Left 10/2017     SECTION      CHOLECYSTECTOMY      COLON SURGERY      cystoscope      CYSTOSCOPY W/ RETROGRADES Right 10/10/2019    Procedure: CYSTOSCOPY, WITH RETROGRADE PYELOGRAM;  Surgeon: Gen Isbell MD;  Location: [facility] OR;  Service: Urology;  Laterality: Right;    ERCP N/A 2021    Procedure: ERCP (ENDOSCOPIC RETROGRADE CHOLANGIOPANCREATOGRAPHY);  Surgeon: Jayjay Rivera MD;  Location: [facility] ENDO [address]);  Service: Endoscopy;  Laterality: N/A;     ERCP N/A 3/4/2022    Procedure: ERCP (ENDOSCOPIC RETROGRADE CHOLANGIOPANCREATOGRAPHY);  Surgeon: Roby Virgen MD;  Location: [address]);  Service: Endoscopy;  Laterality: N/A;    EYE SURGERY      HYSTERECTOMY  5/1996    LITHOTRIPSY      LIVER BIOPSY  9/14    carcinoid    URETEROSCOPY Right 10/10/2019    Procedure: URETEROSCOPY;  Surgeon: Gen Isbell MD;  Location: [facility] OR;  Service: Urology;  Laterality: Right;    UTERINE FIBROID SURGERY         Family History   Problem Relation Age of Onset    Cancer Mother         unknown    Alzheimer's disease Father     Stroke Sister     No Known Problems Son     No Known Problems Son     No Known Problems Son     No Known Problems Son     Kidney disease Neg Hx        Social History     Socioeconomic History    Marital status:    Occupational History    Occupation: disabled    Tobacco Use    Smoking status: Never Smoker    Smokeless tobacco: Never Used   Substance and Sexual Activity    Alcohol use: No     Alcohol/week: 0.0 standard drinks    Drug use: No    Sexual activity: Not Currently   Social History Narrative    Pt lives with son     Social Determinants of Health     Financial Resource Strain: Medium Risk    Difficulty of Paying Living Expenses: Somewhat hard   Food Insecurity: No Food Insecurity    Worried About Running Out of Food in the Last Year: Never true    Ran Out of Food in the Last Year: Never true   Transportation Needs: No Transportation Needs    Lack of Transportation (Medical): No    Lack of Transportation (Non-Medical): No   Physical Activity: Inactive    Days of Exercise per Week: 0 days    Minutes of Exercise per Session: 0 min   Stress: No Stress Concern Present    Feeling of Stress : Only a little   Social Connections: Unknown    Marital Status:    Housing Stability: Low Risk     Unable to Pay for Housing in the Last Year: No    Number of Places Lived in the Last Year:  1    Unstable Housing in the Last Year: No       Review of Systems   Constitutional: Negative for appetite change, fever and unexpected weight change.   HENT: Negative for trouble swallowing.    Respiratory: Negative for shortness of breath.    Cardiovascular: Negative for chest pain.   Gastrointestinal: Positive for abdominal pain and nausea. Negative for constipation, diarrhea and vomiting.   Musculoskeletal: Negative for back pain.   Neurological: Negative for dizziness.   Psychiatric/Behavioral: Negative for dysphoric mood.         Objective:     Vitals:    06/14/22 0926   BP: (!) 148/88   BP Location: Left arm   Patient Position: Sitting   BP Method: Medium (Automatic)   Weight: 84.2 kg (185 lb 11.2 oz)          Physical Exam  Constitutional:       General: She is not in acute distress.  HENT:      Head: Normocephalic.   Eyes:      Conjunctiva/sclera: Conjunctivae normal.      Pupils: Pupils are equal, round, and reactive to light.   Pulmonary:      Effort: Pulmonary effort is normal. No respiratory distress.   Abdominal:      General: Bowel sounds are normal. There is no distension.      Palpations: Abdomen is soft.      Tenderness: There is no abdominal tenderness.   Musculoskeletal:         General: No swelling.      Cervical back: Normal range of motion.   Skin:     General: Skin is warm and dry.   Neurological:      Mental Status: She is alert and oriented to person, place, and time.   Psychiatric:         Mood and Affect: Mood normal.         Behavior: Behavior normal.               Assessment:         ICD-10-CM ICD-9-CM   1. Epigastric pain  R10.13 789.06   2. Gastroesophageal reflux disease, unspecified whether esophagitis present  K21.9 530.81   3. 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 Metastatic carcinoid tumor  C7B.00 209.70       Plan:       Epigastric pain; Gastroesophageal reflux disease, unspecified whether esophagitis present  -     dicyclomine (BENTYL) 20 mg  tablet; Take 1 tablet (20 mg total) by mouth 3 (three) times daily as needed (abdominal pain).  Dispense: 60 tablet; Refill: 2  -     pantoprazole (PROTONIX) 40 MG tablet; Take 1 tablet (40 mg total) by mouth once daily.  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     Patient's Medications   New Prescriptions    DICYCLOMINE (BENTYL) 20 MG TABLET    TAKE 1 TABLET(20 MG) BY MOUTH THREE TIMES DAILY AS NEEDED FOR ABDOMINAL PAIN    PANTOPRAZOLE (PROTONIX) 40 MG TABLET    Take 1 tablet (40 mg total) by mouth once daily.   Previous Medications    AMLODIPINE (NORVASC) 5 MG TABLET    Take 1 tablet (5 mg total) by mouth once daily.    ASCORBIC ACID, VITAMIN C, (VITAMIN C) 1000 MG TABLET    Take 1 tablet (1,000 mg total) by mouth 2 (two) times daily.    ATORVASTATIN (LIPITOR) 40 MG TABLET    Take 1 tablet (40 mg total) by mouth every evening.    CYANOCOBALAMIN (VITAMIN B-12) 1000 MCG TABLET    Take 1 tablet (1,000 mcg total) by mouth once daily.    DICLOFENAC SODIUM (VOLTAREN) 1 % GEL    Apply 2 g topically 4 (four) times daily as needed (area of pain).    DIPHENOXYLATE-ATROPINE 2.5-0.025 MG (LOMOTIL) 2.5-0.025 MG PER TABLET    Take 1 tablet by mouth 4 (four) times daily as needed.    LIDOCAINE (LIDODERM) 5 %    Place 1 patch onto the skin once daily. Remove & Discard patch within 12 hours or as directed by MD    LOSARTAN (COZAAR) 100 MG TABLET    TAKE 1 TABLET (100 MG TOTAL) BY MOUTH ONCE DAILY.    MAGNESIUM OXIDE (MAG-OX) 400 MG (241.3 MG MAGNESIUM) TABLET    Take 1 tablet (400 mg total) by mouth 2 (two) times daily.    MECLIZINE (ANTIVERT) 25 MG TABLET    TAKE 1 TABLET(25 MG) BY MOUTH THREE TIMES DAILY AS NEEDED FOR DIZZINESS    METHENAMINE (HIPREX) 1 GRAM TAB     Take 1 tablet (1 g total) by mouth 2 (two) times daily.    METOPROLOL TARTRATE (LOPRESSOR) 25 MG TABLET    TAKE 1 TABLET TWICE DAILY    OXYCODONE (ROXICODONE) 10 MG TAB IMMEDIATE RELEASE TABLET    Take 1 tablet (10 mg total) by mouth every 6 (six) hours as needed for Pain.    OXYCODONE (ROXICODONE) 15 MG TAB    Take 15 mg by mouth every 4 (four) hours as needed (chronic abdominal pain, malignancy related).    OXYCODONE-ACETAMINOPHEN (PERCOCET)  MG PER TABLET    Take 1 tablet by mouth every 4 (four) hours as needed for Pain.   Modified Medications    No medications on file   Discontinued Medications    No medications on file

## 2022-06-20 ENCOUNTER — OUTPATIENT CASE MANAGEMENT (OUTPATIENT)
Dept: ADMINISTRATIVE | Facility: OTHER | Age: 72
End: 2022-06-20
Payer: MEDICARE

## 2022-06-20 NOTE — PROGRESS NOTES
Outpatient Care Management  Plan of Care Follow Up Visit    Patient: Patito Domingo  MRN: [id number]  Date of Service: 06/20/2022  Completed by: Marcie Thrasher RN  Referral Date: 02/10/2022  Program:     Reason for Visit   Patient presents with    OPCM Chart Review    Update Plan Of Care    Follow-up       Brief Summary:       Next steps from previous encounter  Fu on use of vit c , probiotics and methenamine   Taking as prescribed  Fu on message to dr. borja done and request for gi placed  Noted and done  Fu on contact from gi pt to gi  Pt saw gi on 6/14  Fu on appt with neuroendocrine and recommendations for oncology  Fu on symptoms of uti    Interventions  Chart reviewed  Reviewed upcoming appt schedule    Assess/review short term goals met       Next steps          Patient Summary     Involvement of Care:  Do I have permission to speak with other family members about your care?       Patient Reported Labs & Vitals:  1.  Any Patient Reported Labs & Vitals?     2.  Patient Reported Blood Pressure:     3.  Patient Reported Pulse:     4.  Patient Reported Weight (Kg):     5.  Patient Reported Blood Glucose (mg/dl):       Medical and social history was reviewed with patient and/or caregiver.     Clinical Assessment     Reviewed and provided basic information on available community resources for mental health, transportation, wellness resources, and palliative care programs with patient and/or caregiver.     Complex Care Plan     Care plan was discussed and completed today with input from patient and/or caregiver.    Patient Instructions     Instructions were provided via the [facility] patient resources and are available for the patient to view on the patient portal.      Todays OPCM Self-Management Care Plan was developed with the patients/caregivers input and was based on identified barriers from todays assessment.  Goals were written today with the patient/caregiver and the patient has agreed to work towards  these goals to improve his/her overall well-being. Patient verbalized understanding of the care plan, goals, and all of today's instructions. Encouraged patient/caregiver to communicate with his/her physician and health care team about health conditions and the treatment plan.  Provided my contact information today and encouraged patient/caregiver to call me with any questions as needed.

## 2022-06-21 ENCOUNTER — OFFICE VISIT (OUTPATIENT)
Dept: FAMILY MEDICINE | Facility: CLINIC | Age: 72
End: 2022-06-21
Payer: MEDICARE

## 2022-06-21 VITALS
TEMPERATURE: 98 F | OXYGEN SATURATION: 98 % | HEIGHT: 66 IN | BODY MASS INDEX: 28.53 KG/M2 | SYSTOLIC BLOOD PRESSURE: 124 MMHG | DIASTOLIC BLOOD PRESSURE: 76 MMHG | HEART RATE: 53 BPM | WEIGHT: 177.5 LBS

## 2022-06-21 DIAGNOSIS — R25.2 MUSCLE CRAMPS: ICD-10-CM

## 2022-06-21 DIAGNOSIS — N39.0 RECURRENT UTI: ICD-10-CM

## 2022-06-21 DIAGNOSIS — K90.9 DIARRHEA DUE TO MALABSORPTION: ICD-10-CM

## 2022-06-21 DIAGNOSIS — C7B.02 METASTATIC MALIGNANT CARCINOID TUMOR TO LIVER: ICD-10-CM

## 2022-06-21 DIAGNOSIS — G89.4 CHRONIC PAIN SYNDROME: ICD-10-CM

## 2022-06-21 DIAGNOSIS — Z76.89 ENCOUNTER TO ESTABLISH CARE WITH NEW DOCTOR: Primary | ICD-10-CM

## 2022-06-21 DIAGNOSIS — I10 ESSENTIAL HYPERTENSION: Chronic | ICD-10-CM

## 2022-06-21 DIAGNOSIS — R19.7 DIARRHEA DUE TO MALABSORPTION: ICD-10-CM

## 2022-06-21 DIAGNOSIS — C7B.00 METASTATIC CARCINOID TUMOR: Chronic | ICD-10-CM

## 2022-06-21 DIAGNOSIS — M79.10 MYALGIA: ICD-10-CM

## 2022-06-21 DIAGNOSIS — R53.1 WEAKNESS: ICD-10-CM

## 2022-06-21 PROBLEM — R10.84 GENERALIZED ABDOMINAL PAIN: Status: RESOLVED | Noted: 2020-01-14 | Resolved: 2022-06-21

## 2022-06-21 PROBLEM — Z51.5 PALLIATIVE CARE ENCOUNTER: Status: RESOLVED | Noted: 2018-12-06 | Resolved: 2022-06-21

## 2022-06-21 PROBLEM — E83.42 HYPOMAGNESEMIA: Status: RESOLVED | Noted: 2018-04-29 | Resolved: 2022-06-21

## 2022-06-21 PROBLEM — G89.29 CHRONIC ABDOMINAL PAIN: Status: RESOLVED | Noted: 2020-02-05 | Resolved: 2022-06-21

## 2022-06-21 PROBLEM — Z71.9 ENCOUNTER FOR EDUCATION: Status: RESOLVED | Noted: 2018-12-06 | Resolved: 2022-06-21

## 2022-06-21 PROBLEM — N20.0 NEPHROLITHIASIS: Status: RESOLVED | Noted: 2019-10-10 | Resolved: 2022-06-21

## 2022-06-21 PROBLEM — Z91.81 HISTORY OF FALLING: Status: RESOLVED | Noted: 2022-04-05 | Resolved: 2022-06-21

## 2022-06-21 PROBLEM — K80.50 CHOLEDOCHOLITHIASIS: Status: RESOLVED | Noted: 2022-03-01 | Resolved: 2022-06-21

## 2022-06-21 PROBLEM — H52.00 HYPEROPIA: Status: RESOLVED | Noted: 2022-04-03 | Resolved: 2022-06-21

## 2022-06-21 PROBLEM — Z16.24 MULTIPLE DRUG RESISTANT ORGANISM (MDRO) CULTURE POSITIVE: Status: RESOLVED | Noted: 2022-03-27 | Resolved: 2022-06-21

## 2022-06-21 PROBLEM — E78.00 PURE HYPERCHOLESTEROLEMIA: Status: RESOLVED | Noted: 2021-07-03 | Resolved: 2022-06-21

## 2022-06-21 PROBLEM — M25.619 DECREASED RANGE OF MOTION OF SHOULDER: Status: RESOLVED | Noted: 2020-01-09 | Resolved: 2022-06-21

## 2022-06-21 PROBLEM — N12 PYELONEPHRITIS: Status: RESOLVED | Noted: 2022-04-04 | Resolved: 2022-06-21

## 2022-06-21 PROBLEM — D50.9 IRON DEFICIENCY ANEMIA: Status: RESOLVED | Noted: 2018-08-14 | Resolved: 2022-06-21
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
## 2022-06-21 PROBLEM — Z86.19 HISTORY OF ESBL E. COLI INFECTION: Status: RESOLVED | Noted: 2022-03-27 | Resolved: 2022-06-21

## 2022-06-21 PROBLEM — R42 VERTIGO: Status: RESOLVED | Noted: 2022-04-04 | Resolved: 2022-06-21

## 2022-06-21 PROBLEM — Z74.09 IMPAIRED FUNCTIONAL MOBILITY, BALANCE, GAIT, AND ENDURANCE: Status: RESOLVED | Noted: 2020-01-14 | Resolved: 2022-06-21

## 2022-06-21 PROBLEM — K80.33 CALCULUS OF BILE DUCT WITH ACUTE CHOLANGITIS WITH OBSTRUCTION: Status: RESOLVED | Noted: 2021-11-25 | Resolved: 2022-06-21

## 2022-06-21 PROBLEM — R10.31 RIGHT LOWER QUADRANT PAIN: Status: RESOLVED | Noted: 2019-10-10 | Resolved: 2022-06-21

## 2022-06-21 PROBLEM — Z71.89 GOALS OF CARE, COUNSELING/DISCUSSION: Status: RESOLVED | Noted: 2018-12-06 | Resolved: 2022-06-21

## 2022-06-21 PROBLEM — M25.511 RIGHT SHOULDER PAIN: Status: RESOLVED | Noted: 2019-11-18 | Resolved: 2022-06-21

## 2022-06-21 PROBLEM — R55 SYNCOPE: Status: RESOLVED | Noted: 2022-04-03 | Resolved: 2022-06-21

## 2022-06-21 PROBLEM — Z29.89 SEIZURE PROPHYLAXIS: Status: RESOLVED | Noted: 2021-03-08 | Resolved: 2022-06-21

## 2022-06-21 PROBLEM — E44.0 MODERATE MALNUTRITION: Status: RESOLVED | Noted: 2021-11-15 | Resolved: 2022-06-21

## 2022-06-21 PROBLEM — N23 RENAL COLIC ON RIGHT SIDE: Status: RESOLVED | Noted: 2019-09-17 | Resolved: 2022-06-21

## 2022-06-21 PROBLEM — D49.7 NEOPLASM OF UNSPECIFIED BEHAVIOR OF ENDOCRINE GLANDS AND OTHER PARTS OF NERVOUS SYSTEM: Status: RESOLVED | Noted: 2020-03-13 | Resolved: 2022-06-21

## 2022-06-21 PROBLEM — S02.5XXA CLOSED FRACTURE OF TOOTH: Status: RESOLVED | Noted: 2022-04-04 | Resolved: 2022-06-21

## 2022-06-21 PROBLEM — R79.89 ELEVATED TROPONIN: Status: RESOLVED | Noted: 2021-07-03 | Resolved: 2022-06-21

## 2022-06-21 PROBLEM — R35.0 INCREASED FREQUENCY OF URINATION: Status: RESOLVED | Noted: 2018-05-19 | Resolved: 2022-06-21

## 2022-06-21 PROBLEM — R10.31 RIGHT LOWER QUADRANT ABDOMINAL PAIN: Status: RESOLVED | Noted: 2020-11-25 | Resolved: 2022-06-21

## 2022-06-21 PROBLEM — F11.90 NARCOTIC DRUG USE: Status: RESOLVED | Noted: 2018-12-03 | Resolved: 2022-06-21

## 2022-06-21 PROBLEM — R50.9 FEVER: Status: RESOLVED | Noted: 2021-10-05 | Resolved: 2022-06-21

## 2022-06-21 PROBLEM — R29.898 WEAKNESS OF SHOULDER: Status: RESOLVED | Noted: 2020-01-09 | Resolved: 2022-06-21

## 2022-06-21 PROBLEM — R10.9 CHRONIC ABDOMINAL PAIN: Status: RESOLVED | Noted: 2020-02-05 | Resolved: 2022-06-21

## 2022-06-21 PROBLEM — N20.0 BILATERAL NEPHROLITHIASIS: Status: RESOLVED | Noted: 2019-09-17 | Resolved: 2022-06-21

## 2022-06-21 PROCEDURE — 4010F ACE/ARB THERAPY RXD/TAKEN: CPT | Mod: CPTII,S$GLB,, | Performed by: STUDENT IN AN ORGANIZED HEALTH CARE EDUCATION/TRAINING PROGRAM

## 2022-06-21 PROCEDURE — 3008F BODY MASS INDEX DOCD: CPT | Mod: CPTII,S$GLB,, | Performed by: STUDENT IN AN ORGANIZED HEALTH CARE EDUCATION/TRAINING PROGRAM

## 2022-06-21 PROCEDURE — 1125F PR PAIN SEVERITY QUANTIFIED, PAIN PRESENT: ICD-10-PCS | Mod: CPTII,S$GLB,, | Performed by: STUDENT IN AN ORGANIZED HEALTH CARE EDUCATION/TRAINING PROGRAM

## 2022-06-21 PROCEDURE — 1159F PR MEDICATION LIST DOCUMENTED IN MEDICAL RECORD: ICD-10-PCS | Mod: CPTII,S$GLB,, | Performed by: STUDENT IN AN ORGANIZED HEALTH CARE EDUCATION/TRAINING PROGRAM

## 2022-06-21 PROCEDURE — 3074F PR MOST RECENT SYSTOLIC BLOOD PRESSURE < 130 MM HG: ICD-10-PCS | Mod: CPTII,S$GLB,, | Performed by: STUDENT IN AN ORGANIZED HEALTH CARE EDUCATION/TRAINING PROGRAM

## 2022-06-21 PROCEDURE — 1101F PR PT FALLS ASSESS DOC 0-1 FALLS W/OUT INJ PAST YR: ICD-10-PCS | Mod: CPTII,S$GLB,, | Performed by: STUDENT IN AN ORGANIZED HEALTH CARE EDUCATION/TRAINING PROGRAM

## 2022-06-21 PROCEDURE — 99999 PR PBB SHADOW E&M-EST. PATIENT-LVL V: CPT | Mod: PBBFAC,,, | Performed by: STUDENT IN AN ORGANIZED HEALTH CARE EDUCATION/TRAINING PROGRAM

## 2022-06-21 PROCEDURE — 3078F PR MOST RECENT DIASTOLIC BLOOD PRESSURE < 80 MM HG: ICD-10-PCS | Mod: CPTII,S$GLB,, | Performed by: STUDENT IN AN ORGANIZED HEALTH CARE EDUCATION/TRAINING PROGRAM

## 2022-06-21 PROCEDURE — 1160F PR REVIEW ALL MEDS BY PRESCRIBER/CLIN PHARMACIST DOCUMENTED: ICD-10-PCS | Mod: CPTII,S$GLB,, | Performed by: STUDENT IN AN ORGANIZED HEALTH CARE EDUCATION/TRAINING PROGRAM

## 2022-06-21 PROCEDURE — 3288F PR FALLS RISK ASSESSMENT DOCUMENTED: ICD-10-PCS | Mod: CPTII,S$GLB,, | Performed by: STUDENT IN AN ORGANIZED HEALTH CARE EDUCATION/TRAINING PROGRAM

## 2022-06-21 PROCEDURE — 4010F PR ACE/ARB THEARPY RXD/TAKEN: ICD-10-PCS | Mod: CPTII,S$GLB,, | Performed by: STUDENT IN AN ORGANIZED HEALTH CARE EDUCATION/TRAINING PROGRAM

## 2022-06-21 PROCEDURE — 99214 PR OFFICE/OUTPT VISIT, EST, LEVL IV, 30-39 MIN: ICD-10-PCS | Mod: S$GLB,,, | Performed by: STUDENT IN AN ORGANIZED HEALTH CARE EDUCATION/TRAINING PROGRAM

## 2022-06-21 PROCEDURE — 3288F FALL RISK ASSESSMENT DOCD: CPT | Mod: CPTII,S$GLB,, | Performed by: STUDENT IN AN ORGANIZED HEALTH CARE EDUCATION/TRAINING PROGRAM

## 2022-06-21 PROCEDURE — 3044F HG A1C LEVEL LT 7.0%: CPT | Mod: CPTII,S$GLB,, | Performed by: STUDENT IN AN ORGANIZED HEALTH CARE EDUCATION/TRAINING PROGRAM

## 2022-06-21 PROCEDURE — 1160F RVW MEDS BY RX/DR IN RCRD: CPT | Mod: CPTII,S$GLB,, | Performed by: STUDENT IN AN ORGANIZED HEALTH CARE EDUCATION/TRAINING PROGRAM

## 2022-06-21 PROCEDURE — 1159F MED LIST DOCD IN RCRD: CPT | Mod: CPTII,S$GLB,, | Performed by: STUDENT IN AN ORGANIZED HEALTH CARE EDUCATION/TRAINING PROGRAM

## 2022-06-21 PROCEDURE — 99214 OFFICE O/P EST MOD 30 MIN: CPT | Mod: S$GLB,,, | Performed by: STUDENT IN AN ORGANIZED HEALTH CARE EDUCATION/TRAINING PROGRAM

## 2022-06-21 PROCEDURE — 1101F PT FALLS ASSESS-DOCD LE1/YR: CPT | Mod: CPTII,S$GLB,, | Performed by: STUDENT IN AN ORGANIZED HEALTH CARE EDUCATION/TRAINING PROGRAM

## 2022-06-21 PROCEDURE — 3074F SYST BP LT 130 MM HG: CPT | Mod: CPTII,S$GLB,, | Performed by: STUDENT IN AN ORGANIZED HEALTH CARE EDUCATION/TRAINING PROGRAM

## 2022-06-21 PROCEDURE — 3078F DIAST BP <80 MM HG: CPT | Mod: CPTII,S$GLB,, | Performed by: STUDENT IN AN ORGANIZED HEALTH CARE EDUCATION/TRAINING PROGRAM

## 2022-06-21 PROCEDURE — 1125F AMNT PAIN NOTED PAIN PRSNT: CPT | Mod: CPTII,S$GLB,, | Performed by: STUDENT IN AN ORGANIZED HEALTH CARE EDUCATION/TRAINING PROGRAM

## 2022-06-21 PROCEDURE — 3044F PR MOST RECENT HEMOGLOBIN A1C LEVEL <7.0%: ICD-10-PCS | Mod: CPTII,S$GLB,, | Performed by: STUDENT IN AN ORGANIZED HEALTH CARE EDUCATION/TRAINING PROGRAM

## 2022-06-21 PROCEDURE — 3008F PR BODY MASS INDEX (BMI) DOCUMENTED: ICD-10-PCS | Mod: CPTII,S$GLB,, | Performed by: STUDENT IN AN ORGANIZED HEALTH CARE EDUCATION/TRAINING PROGRAM

## 2022-06-21 PROCEDURE — 99999 PR PBB SHADOW E&M-EST. PATIENT-LVL V: ICD-10-PCS | Mod: PBBFAC,,, | Performed by: STUDENT IN AN ORGANIZED HEALTH CARE EDUCATION/TRAINING PROGRAM

## 2022-06-21 RX ORDER — OXYCODONE HYDROCHLORIDE 10 MG/1
10 TABLET ORAL EVERY 8 HOURS PRN
Qty: 60 TABLET | Refills: 0 | Status: SHIPPED | OUTPATIENT
Start: 2022-06-21 | End: 2022-06-21 | Stop reason: SDUPTHER

## 2022-06-21 RX ORDER — OXYCODONE HYDROCHLORIDE 10 MG/1
10 TABLET ORAL EVERY 8 HOURS PRN
Qty: 60 TABLET | Refills: 0 | Status: ON HOLD | OUTPATIENT
Start: 2022-06-21 | End: 2022-07-29 | Stop reason: HOSPADM

## 2022-06-21 RX ORDER — DICLOFENAC SODIUM 10 MG/G
2 GEL TOPICAL 4 TIMES DAILY PRN
Qty: 100 G | Refills: 0 | Status: ON HOLD | OUTPATIENT
Start: 2022-06-21 | End: 2022-07-10 | Stop reason: HOSPADM

## 2022-06-21 RX ORDER — GABAPENTIN 300 MG/1
300 CAPSULE ORAL NIGHTLY
Qty: 30 CAPSULE | Refills: 1 | Status: SHIPPED | OUTPATIENT
Start: 2022-06-21 | End: 2023-03-07

## 2022-06-21 RX ORDER — BACLOFEN 10 MG/1
10 TABLET ORAL 2 TIMES DAILY
Qty: 30 TABLET | Refills: 1 | Status: SHIPPED | OUTPATIENT
Start: 2022-06-21 | End: 2022-06-24

## 2022-06-21 RX ORDER — METOPROLOL SUCCINATE 25 MG/1
12.5 TABLET, EXTENDED RELEASE ORAL DAILY
Qty: 45 TABLET | Refills: 3 | Status: ON HOLD | OUTPATIENT
Start: 2022-06-21 | End: 2022-07-10 | Stop reason: SDUPTHER

## 2022-06-21 RX ORDER — AMLODIPINE BESYLATE 5 MG/1
5 TABLET ORAL DAILY
Qty: 30 TABLET | Refills: 11 | Status: CANCELLED | OUTPATIENT
Start: 2022-06-21 | End: 2023-06-21

## 2022-06-21 RX ORDER — OXYCODONE HYDROCHLORIDE 10 MG/1
10 TABLET ORAL EVERY 6 HOURS PRN
Qty: 10 TABLET | Refills: 0 | Status: CANCELLED | OUTPATIENT
Start: 2022-06-21

## 2022-06-21 NOTE — TELEPHONE ENCOUNTER
Called and spoke with pt in regards of her message. Pt is calling in regards of her medications. Pt informed me that the pharmacy never got her  OxyCodone 10 mg immediate release medication, and she needs a refill on the Voltaren gel as well. Please advise message.

## 2022-06-21 NOTE — TELEPHONE ENCOUNTER
Called and spoke with pt in regards of her message. Informed pt that Dr. Wei has filled her medication and it was sent to her pharmacy of choice. Pt verbalized understanding of message.

## 2022-06-21 NOTE — ASSESSMENT & PLAN NOTE
Controlled  Out of amlodpine so not taken in a month or so and pressures still good  Notes at times feels dizzy and can nearly pass out thinks pressures may be low  Still taking losartan and metoprolol 25 BID also

## 2022-06-21 NOTE — TELEPHONE ENCOUNTER
----- Message from Judith Grimes, Patient Care Assistant sent at 6/21/2022  1:09 PM CDT -----  Type:  Needs Medical Advice    Who Called:  pt  Symptoms (please be specific):  Patient would like a call back regarding her medication oxyCODONE (ROXICODONE) 10 mg Tab immediate release tablet  Would the patient rather a call back or a response via [facility]?  call  Best Call Back Number:  [phone]   Additional Information:

## 2022-06-21 NOTE — PROGRESS NOTES
Subjective:       Patient ID: Patito Domingo is a 71 y.o. female.    Chief Complaint: Establish Care (Pt here to est care )    Metastatic carcinoid tumor  Sees heme/onc regualrrly   Neuroendocrine team  She is doing chemo   Continues with lots of pain; per onc PCP to handle pain  She states that the tylenol causes issues so cannot take and recently given percocet and this made her feel weird  She does well with oxycodone 5-10mg 1-2 times a day   She needs new script   Also has LE cramping and pain/tightness and LE weakness; working with home PT but continues to feel weaker really since last chemo session   lidocaine patches and creams help some also with pains  Abdominal pains are the worst     Diarrhea  Chronic  Lomotil and bentyl    Essential hypertension  Controlled  Out of amlodpine so not taken in a month or so and pressures still good  Notes at times feels dizzy and can nearly pass out thinks pressures may be low  Still taking losartan and metoprolol 25 BID also     Recurrent UTI  Sees urology   hiprex     Review of Systems   Constitutional: Positive for activity change, appetite change and fatigue. Negative for fever.   HENT: Negative.    Respiratory: Positive for shortness of breath. Negative for cough and wheezing.    Cardiovascular: Negative for chest pain and leg swelling.   Gastrointestinal: Positive for abdominal distention, abdominal pain and diarrhea. Negative for nausea and vomiting.   Genitourinary: Positive for frequency. Negative for difficulty urinating and dysuria.   Musculoskeletal: Positive for arthralgias, back pain, gait problem and myalgias.   Neurological: Positive for dizziness, tremors, syncope, weakness and light-headedness. Negative for numbness and headaches.   Psychiatric/Behavioral: Positive for dysphoric mood and sleep disturbance. The patient is nervous/anxious.       Objective:      Vitals:    06/21/22 0852   BP: 124/76   Pulse: (!) 53   Temp: 97.7 °F (36.5 °C)      Physical  Exam  Constitutional:       Appearance: Normal appearance. She is normal weight. She is ill-appearing.   HENT:      Head: Normocephalic and atraumatic.   Eyes:      Conjunctiva/sclera: Conjunctivae normal.   Cardiovascular:      Rate and Rhythm: Regular rhythm. Bradycardia present.      Heart sounds: Normal heart sounds.   Pulmonary:      Effort: Pulmonary effort is normal.      Breath sounds: Normal breath sounds.   Abdominal:      General: There is distension.      Palpations: Abdomen is soft.      Tenderness: There is abdominal tenderness.   Musculoskeletal:         General: Normal range of motion.      Cervical back: Normal range of motion.      Right lower leg: No edema.      Left lower leg: No edema.   Neurological:      General: No focal deficit present.      Mental Status: She is alert and oriented to person, place, and time.      Motor: Weakness present.      Gait: Gait abnormal.   Psychiatric:         Mood and Affect: Mood normal.         Behavior: Behavior normal.          Assessment:       1. Encounter to establish care with new doctor    2. 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 Metastatic carcinoid tumor    4. Chronic pain syndrome    5. Muscle cramps    6. Myalgia    7. Diarrhea due to malabsorption    8. Essential hypertension    9. Recurrent UTI    10. Weakness        Plan:   1. Encounter to establish care with new doctor    2. 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  Dispense: 30 capsule; Refill: 1  - Magnesium; Future  - Comprehensive Metabolic Panel; Future  - PHOSPHORUS; Future  - baclofen (LIORESAL) 10 MG tablet; Take 1 tablet (10 mg total) by mouth 2 (two) times daily.  Dispense: 30 tablet; Refill: 1  - metoprolol succinate (TOPROL-XL) 25 MG 24 hr tablet; Take 0.5 tablets (12.5 mg total) by mouth once daily.  Dispense: 45 tablet; Refill: 3  - Ambulatory referral/consult to HOME  Palliative Care; Future  - Ambulatory referral/consult to Home Health; Future    3. Metastatic carcinoid tumor  - Ambulatory referral/consult to HOME Palliative Care; Future  - Ambulatory referral/consult to Home Health; Future    4. Chronic pain syndrome  - gabapentin (NEURONTIN) 300 MG capsule; Take 1 capsule (300 mg total) by mouth every evening.  Dispense: 30 capsule; Refill: 1  - baclofen (LIORESAL) 10 MG tablet; Take 1 tablet (10 mg total) by mouth 2 (two) times daily.  Dispense: 30 tablet; Refill: 1    5. Muscle cramps  - gabapentin (NEURONTIN) 300 MG capsule; Take 1 capsule (300 mg total) by mouth every evening.  Dispense: 30 capsule; Refill: 1  - Magnesium; Future  - Comprehensive Metabolic Panel; Future  - PHOSPHORUS; Future  - baclofen (LIORESAL) 10 MG tablet; Take 1 tablet (10 mg total) by mouth 2 (two) times daily.  Dispense: 30 tablet; Refill: 1    6. Myalgia  - gabapentin (NEURONTIN) 300 MG capsule; Take 1 capsule (300 mg total) by mouth every evening.  Dispense: 30 capsule; Refill: 1  - Magnesium; Future  - Comprehensive Metabolic Panel; Future  - PHOSPHORUS; Future  - baclofen (LIORESAL) 10 MG tablet; Take 1 tablet (10 mg total) by mouth 2 (two) times daily.  Dispense: 30 tablet; Refill: 1    7. Diarrhea due to malabsorption    8. Essential hypertension  - Magnesium; Future  - Comprehensive Metabolic Panel; Future  - PHOSPHORUS; Future  - metoprolol succinate (TOPROL-XL) 25 MG 24 hr tablet; Take 0.5 tablets (12.5 mg total) by mouth once daily.  Dispense: 45 tablet; Refill: 3    9. Recurrent UTI    10. Weakness  - Ambulatory referral/consult to Home Health; Future     Establish care  Labs per orders  HM DEFER  STOP amlodipine   Decrease metoprolol to 12.5   Pain management: oxycodone 10mg BID PRN; gabapentin 300 TID; baclofen 10 PRN  Continue current meds as prescribed otherwise  Keep routine specialist f/u   Refer to home NE for strengthening   Refer to home Palliative care for disease education    RTC in 3 months and/or PRN       Mariela Wei   [facility]   6/21/22

## 2022-06-21 NOTE — ASSESSMENT & PLAN NOTE
Sees heme/onc regualrrly   Neuroendocrine team  She is doing chemo   Continues with lots of pain; per onc PCP to handle pain  She states that the tylenol causes issues so cannot take and recently given percocet and this made her feel weird  She does well with oxycodone 5-10mg 1-2 times a day   She needs new script   Also has LE cramping and pain/tightness and LE weakness; working with home PT but continues to feel weaker really since last chemo session   lidocaine patches and creams help some also with pains  Abdominal pains are the worst

## 2022-06-21 NOTE — TELEPHONE ENCOUNTER
----- Message from Elyssa Kiser sent at 6/21/2022 11:58 AM CDT -----  Regarding: medication  Contact: [phone]  Patient is requesting a call back regarding the rx for  oxyCODONE (ROXICODONE) 10 mg Tab immediate release tablets.   Would the patient rather a call back or a response via [facility]?  Call   Best Call Back Number:  [phone]  Additional Information:

## 2022-06-21 NOTE — TELEPHONE ENCOUNTER
Called [facility] and spoke with Devon in regards of patient's medication. He informed me that they can see the script, but it was not sent e-scribe to them. Please send patient's Oxycodone medication again. Please advise message.

## 2022-06-21 NOTE — TELEPHONE ENCOUNTER
----- Message from Bella Franco sent at 6/21/2022  2:58 PM CDT -----  Contact: [phone]/ Self  Type: Requesting to speak with nurse    Who Called: Pt   Regarding: update on medication refills for oxyCODONE (ROXICODONE) 10 mg Tab immediate release tablet    Would the patient rather a call back or a response via [facility]? Call back  Best Call Back Number: [phone]  Additional Information: [facility] [place]

## 2022-06-24 DIAGNOSIS — K21.9 GASTROESOPHAGEAL REFLUX DISEASE, UNSPECIFIED WHETHER ESOPHAGITIS PRESENT: ICD-10-CM

## 2022-06-24 RX ORDER — PANTOPRAZOLE SODIUM 40 MG/1
40 TABLET, DELAYED RELEASE ORAL DAILY
Qty: 90 TABLET | Refills: 3 | Status: SHIPPED | OUTPATIENT
Start: 2022-06-24 | End: 2023-02-03

## 2022-06-24 NOTE — TELEPHONE ENCOUNTER
----- Message from Mariela Wei, DO sent at 6/23/2022  4:14 PM CDT -----  Please contact the patient and let them know that their results were fine and do not require any change in treatment.  Dehydrated so try to increase water intake

## 2022-06-27 PROCEDURE — G0180 PR HOME HEALTH MD CERTIFICATION: ICD-10-PCS | Mod: ,,, | Performed by: STUDENT IN AN ORGANIZED HEALTH CARE EDUCATION/TRAINING PROGRAM

## 2022-06-27 PROCEDURE — G0180 MD CERTIFICATION HHA PATIENT: HCPCS | Mod: ,,, | Performed by: STUDENT IN AN ORGANIZED HEALTH CARE EDUCATION/TRAINING PROGRAM

## 2022-06-27 NOTE — PATIENT INSTRUCTIONS
Good Morning,    [facility] is notifying doctor:        Migel Marmolejo RN [facility] [phone]    Level of urgency : low      Reason for call :  SIGNIFICANT EVENT/PROBLEM: PATIENT WENT TO [facility], FEB 18, AM FOR ABDOMINAL PAIN.       Thanks  
negative

## 2022-06-29 NOTE — ED NOTES
LVM for patient to return the call to schedule the injection.    [facility] Surgery scheduling code:[id number], [id number], [phone]   Report given to PANCHITO Aragon. All questions answered.

## 2022-06-30 ENCOUNTER — EXTERNAL CHRONIC CARE MANAGEMENT (OUTPATIENT)
Dept: PRIMARY CARE CLINIC | Facility: CLINIC | Age: 72
End: 2022-06-30
Payer: MEDICARE

## 2022-06-30 PROCEDURE — 99487 PR COMPLX CHRON CARE MGMT, 1ST HR, PER MONTH: ICD-10-PCS | Mod: S$GLB,,, | Performed by: INTERNAL MEDICINE

## 2022-06-30 PROCEDURE — 99487 CPLX CHRNC CARE 1ST 60 MIN: CPT | Mod: S$GLB,,, | Performed by: INTERNAL MEDICINE

## 2022-06-30 PROCEDURE — 99489 CPLX CHRNC CARE EA ADDL 30: CPT | Mod: S$GLB,,, | Performed by: INTERNAL MEDICINE

## 2022-06-30 PROCEDURE — 99489 PR COMPLX CHRON CARE MGMT, EA ADDTL 30 MIN, PER MONTH: ICD-10-PCS | Mod: S$GLB,,, | Performed by: INTERNAL MEDICINE

## 2022-06-30 RX ORDER — LIDOCAINE 50 MG/G
1 PATCH TOPICAL DAILY
Qty: 15 PATCH | Refills: 0 | Status: ON HOLD | OUTPATIENT
Start: 2022-06-30 | End: 2022-07-29 | Stop reason: HOSPADM

## 2022-06-30 NOTE — TELEPHONE ENCOUNTER
----- Message from Cecilia Moreno sent at 6/30/2022  1:12 PM CDT -----  Type:  RX Refill Request    Who Called: pt  Refill or New Rx:refill  RX Name and Strength:LIDOcaine (LIDODERM) 5 %  How is the patient currently taking it? (ex. 1XDay):Route: Place 1 patch onto the skin once daily. Remove & Discard patch within 12 hours or as directed by MD - Transdermal  Is this a 30 day or 90 day RX:15 patches  Preferred Pharmacy with phone number:[facility]   Phone: [phone]  Fax:  [phone]        Local or Mail Order:local  Ordering Provider:Dr Wei  Would the patient rather a call back or a response via [facility]? call  Best Call Back Number:[phone]  Additional Information: pt is requesting something to be sent to the pharmacy for soar throat

## 2022-07-01 RX ORDER — PHENAZOPYRIDINE HYDROCHLORIDE 100 MG/1
100 TABLET, FILM COATED ORAL 3 TIMES DAILY PRN
Qty: 30 TABLET | Refills: 0 | Status: ON HOLD | OUTPATIENT
Start: 2022-07-01 | End: 2022-07-29 | Stop reason: HOSPADM

## 2022-07-01 RX ORDER — CEPHALEXIN 500 MG/1
500 CAPSULE ORAL EVERY 8 HOURS
Qty: 21 CAPSULE | Refills: 0 | Status: ON HOLD | OUTPATIENT
Start: 2022-07-01 | End: 2022-07-10 | Stop reason: HOSPADM

## 2022-07-01 NOTE — TELEPHONE ENCOUNTER
----- Message from Minda Turpin sent at 7/1/2022  8:01 AM CDT -----  Type:  Needs Medical Advice    Who Called:  pt  Symptoms (please be specific): burning and painful when urinating possible UTI  How long has patient had these symptoms:   2 weeks   Pharmacy name and phone #:   [facility]   Phone: [phone]  Fax:  [phone]    Would the patient rather a call back or a response via [facility]?  Call   Best Call Back Number:  [phone]   Additional Information:  pt would also like to get a call back regarding med requested on yesterday for Burning Throat

## 2022-07-01 NOTE — TELEPHONE ENCOUNTER
Called and spoke with pt in regards of her message. Pt is calling in regards of trying to get some medication for a UTI. Informed pt that Dr. Wei is out of the office today and won't be back until Tuesday of next week. Advised pt that she can also call her Urologist as well in regards of this matter, to see if they can help her as well. Patient verbalized understanding of message. I also informed pt that if she is having that much pain, that she can also either got to an [facility] clinic or ER to seek treatment as well. Pt verbalized understanding of message.

## 2022-07-02 ENCOUNTER — HOSPITAL ENCOUNTER (INPATIENT)
Facility: HOSPITAL | Age: 72
LOS: 10 days | Discharge: NURSING, REHAB OR LONG-TERM CARE | DRG: 871 | End: 2022-07-12
Attending: EMERGENCY MEDICINE | Admitting: HOSPITALIST
Payer: MEDICARE

## 2022-07-02 DIAGNOSIS — R65.20 SEVERE SEPSIS: Primary | ICD-10-CM

## 2022-07-02 DIAGNOSIS — N17.9 AKI (ACUTE KIDNEY INJURY): ICD-10-CM

## 2022-07-02 DIAGNOSIS — R07.9 CHEST PAIN: ICD-10-CM

## 2022-07-02 DIAGNOSIS — A41.9 SEVERE SEPSIS: Primary | ICD-10-CM

## 2022-07-02 DIAGNOSIS — C7B.02 METASTATIC MALIGNANT CARCINOID TUMOR TO LIVER: ICD-10-CM

## 2022-07-02 DIAGNOSIS — N39.0 URINARY TRACT INFECTION WITHOUT HEMATURIA, SITE UNSPECIFIED: ICD-10-CM

## 2022-07-02 DIAGNOSIS — R53.1 WEAKNESS: ICD-10-CM

## 2022-07-02 DIAGNOSIS — A41.9 SEPSIS: ICD-10-CM

## 2022-07-02 DIAGNOSIS — I10 ESSENTIAL HYPERTENSION: Chronic | ICD-10-CM

## 2022-07-02 DIAGNOSIS — R78.81 BACTEREMIA: ICD-10-CM

## 2022-07-02 DIAGNOSIS — R55 SYNCOPE AND COLLAPSE: ICD-10-CM

## 2022-07-02 LAB
ALBUMIN SERPL BCP-MCNC: 3.2 G/DL (ref 3.5–5.2)
ALP SERPL-CCNC: 185 U/L (ref 55–135)
ALT SERPL W/O P-5'-P-CCNC: 47 U/L (ref 10–44)
ANION GAP SERPL CALC-SCNC: 22 MMOL/L (ref 8–16)
AST SERPL-CCNC: 158 U/L (ref 10–40)
BACTERIA #/AREA URNS HPF: ABNORMAL /HPF
BASOPHILS # BLD AUTO: 0.05 K/UL (ref 0–0.2)
BASOPHILS NFR BLD: 0.2 % (ref 0–1.9)
BILIRUB SERPL-MCNC: 0.6 MG/DL (ref 0.1–1)
BILIRUB UR QL STRIP: NEGATIVE
BUN SERPL-MCNC: 38 MG/DL (ref 8–23)
CALCIUM SERPL-MCNC: 9.4 MG/DL (ref 8.7–10.5)
CHLORIDE SERPL-SCNC: 104 MMOL/L (ref 95–110)
CLARITY UR: ABNORMAL
CO2 SERPL-SCNC: 19 MMOL/L (ref 23–29)
COLOR UR: YELLOW
CREAT SERPL-MCNC: 3.9 MG/DL (ref 0.5–1.4)
CTP QC/QA: YES
DIFFERENTIAL METHOD: ABNORMAL
EOSINOPHIL # BLD AUTO: 0 K/UL (ref 0–0.5)
EOSINOPHIL NFR BLD: 0 % (ref 0–8)
ERYTHROCYTE [DISTWIDTH] IN BLOOD BY AUTOMATED COUNT: 16.5 % (ref 11.5–14.5)
EST. GFR  (AFRICAN AMERICAN): 13 ML/MIN/1.73 M^2
EST. GFR  (NON AFRICAN AMERICAN): 11 ML/MIN/1.73 M^2
GLUCOSE SERPL-MCNC: 132 MG/DL (ref 70–110)
GLUCOSE UR QL STRIP: NEGATIVE
HCT VFR BLD AUTO: 38.1 % (ref 37–48.5)
HGB BLD-MCNC: 11.7 G/DL (ref 12–16)
HGB UR QL STRIP: ABNORMAL
HYALINE CASTS #/AREA URNS LPF: 0 /LPF
IMM GRANULOCYTES # BLD AUTO: 0.33 K/UL (ref 0–0.04)
IMM GRANULOCYTES NFR BLD AUTO: 1.5 % (ref 0–0.5)
KETONES UR QL STRIP: NEGATIVE
LACTATE SERPL-SCNC: 5.2 MMOL/L (ref 0.5–2.2)
LEUKOCYTE ESTERASE UR QL STRIP: ABNORMAL
LYMPHOCYTES # BLD AUTO: 0.7 K/UL (ref 1–4.8)
LYMPHOCYTES NFR BLD: 3.4 % (ref 18–48)
MAGNESIUM SERPL-MCNC: 2.2 MG/DL (ref 1.6–2.6)
MCH RBC QN AUTO: 26.5 PG (ref 27–31)
MCHC RBC AUTO-ENTMCNC: 30.7 G/DL (ref 32–36)
MCV RBC AUTO: 86 FL (ref 82–98)
MICROSCOPIC COMMENT: ABNORMAL
MONOCYTES # BLD AUTO: 1.2 K/UL (ref 0.3–1)
MONOCYTES NFR BLD: 5.5 % (ref 4–15)
NEUTROPHILS # BLD AUTO: 19.7 K/UL (ref 1.8–7.7)
NEUTROPHILS NFR BLD: 89.4 % (ref 38–73)
NITRITE UR QL STRIP: NEGATIVE
NRBC BLD-RTO: 0 /100 WBC
PH UR STRIP: 6 [PH] (ref 5–8)
PLATELET # BLD AUTO: 204 K/UL (ref 150–450)
PMV BLD AUTO: 11.2 FL (ref 9.2–12.9)
POTASSIUM SERPL-SCNC: 4.8 MMOL/L (ref 3.5–5.1)
PROT SERPL-MCNC: 7.9 G/DL (ref 6–8.4)
PROT UR QL STRIP: ABNORMAL
RBC # BLD AUTO: 4.42 M/UL (ref 4–5.4)
RBC #/AREA URNS HPF: 3 /HPF (ref 0–4)
SARS-COV-2 RDRP RESP QL NAA+PROBE: NEGATIVE
SODIUM SERPL-SCNC: 145 MMOL/L (ref 136–145)
SP GR UR STRIP: 1.01 (ref 1–1.03)
TROPONIN I SERPL DL<=0.01 NG/ML-MCNC: 0.28 NG/ML (ref 0–0.03)
UNIDENT CRYS URNS QL MICRO: 2
URN SPEC COLLECT METH UR: ABNORMAL
UROBILINOGEN UR STRIP-ACNC: NEGATIVE EU/DL
WBC # BLD AUTO: 22.03 K/UL (ref 3.9–12.7)
WBC #/AREA URNS HPF: >100 /HPF (ref 0–5)
WBC CLUMPS URNS QL MICRO: ABNORMAL
YEAST URNS QL MICRO: ABNORMAL

## 2022-07-02 PROCEDURE — 83605 ASSAY OF LACTIC ACID: CPT | Performed by: EMERGENCY MEDICINE

## 2022-07-02 PROCEDURE — 87088 URINE BACTERIA CULTURE: CPT | Performed by: EMERGENCY MEDICINE

## 2022-07-02 PROCEDURE — 85025 COMPLETE CBC W/AUTO DIFF WBC: CPT | Performed by: EMERGENCY MEDICINE

## 2022-07-02 PROCEDURE — 87040 BLOOD CULTURE FOR BACTERIA: CPT | Performed by: EMERGENCY MEDICINE

## 2022-07-02 PROCEDURE — 80053 COMPREHEN METABOLIC PANEL: CPT | Performed by: EMERGENCY MEDICINE

## 2022-07-02 PROCEDURE — 81000 URINALYSIS NONAUTO W/SCOPE: CPT | Performed by: EMERGENCY MEDICINE

## 2022-07-02 PROCEDURE — 63600175 PHARM REV CODE 636 W HCPCS: Performed by: EMERGENCY MEDICINE

## 2022-07-02 PROCEDURE — 25000003 PHARM REV CODE 250: Performed by: EMERGENCY MEDICINE

## 2022-07-02 PROCEDURE — 84484 ASSAY OF TROPONIN QUANT: CPT | Performed by: EMERGENCY MEDICINE

## 2022-07-02 PROCEDURE — 93010 ELECTROCARDIOGRAM REPORT: CPT | Mod: ,,, | Performed by: INTERNAL MEDICINE

## 2022-07-02 PROCEDURE — 93010 EKG 12-LEAD: ICD-10-PCS | Mod: ,,, | Performed by: INTERNAL MEDICINE

## 2022-07-02 PROCEDURE — 93005 ELECTROCARDIOGRAM TRACING: CPT

## 2022-07-02 PROCEDURE — 99285 EMERGENCY DEPT VISIT HI MDM: CPT | Mod: 25

## 2022-07-02 PROCEDURE — 83735 ASSAY OF MAGNESIUM: CPT | Performed by: EMERGENCY MEDICINE

## 2022-07-02 PROCEDURE — 96360 HYDRATION IV INFUSION INIT: CPT

## 2022-07-02 PROCEDURE — 87086 URINE CULTURE/COLONY COUNT: CPT | Performed by: EMERGENCY MEDICINE

## 2022-07-02 PROCEDURE — U0002 COVID-19 LAB TEST NON-CDC: HCPCS | Performed by: EMERGENCY MEDICINE

## 2022-07-02 PROCEDURE — 87077 CULTURE AEROBIC IDENTIFY: CPT | Performed by: EMERGENCY MEDICINE

## 2022-07-02 PROCEDURE — 87186 SC STD MICRODIL/AGAR DIL: CPT | Mod: 59 | Performed by: EMERGENCY MEDICINE

## 2022-07-02 PROCEDURE — 12000002 HC ACUTE/MED SURGE SEMI-PRIVATE ROOM

## 2022-07-02 RX ADMIN — SODIUM CHLORIDE 1000 ML: 0.9 INJECTION, SOLUTION INTRAVENOUS at 08:07

## 2022-07-02 RX ADMIN — SODIUM CHLORIDE 1000 ML: 0.9 INJECTION, SOLUTION INTRAVENOUS at 10:07

## 2022-07-02 RX ADMIN — MEROPENEM 1 G: 1 INJECTION, POWDER, FOR SOLUTION INTRAVENOUS at 11:07

## 2022-07-02 RX ADMIN — CEFTRIAXONE 1 G: 1 INJECTION, SOLUTION INTRAVENOUS at 10:07

## 2022-07-03 PROBLEM — A41.9 SEPSIS: Status: ACTIVE | Noted: 2022-07-03

## 2022-07-03 PROBLEM — E87.20 LACTIC ACIDOSIS: Status: ACTIVE | Noted: 2022-07-03

## 2022-07-03 PROBLEM — R79.89 ELEVATED TROPONIN: Status: ACTIVE | Noted: 2022-07-03

## 2022-07-03 LAB
ALBUMIN SERPL BCP-MCNC: 2.4 G/DL (ref 3.5–5.2)
ALP SERPL-CCNC: 120 U/L (ref 55–135)
ALT SERPL W/O P-5'-P-CCNC: 34 U/L (ref 10–44)
ANION GAP SERPL CALC-SCNC: 12 MMOL/L (ref 8–16)
AST SERPL-CCNC: 101 U/L (ref 10–40)
BASOPHILS # BLD AUTO: 0.03 K/UL (ref 0–0.2)
BASOPHILS NFR BLD: 0.2 % (ref 0–1.9)
BILIRUB SERPL-MCNC: 0.4 MG/DL (ref 0.1–1)
BUN SERPL-MCNC: 39 MG/DL (ref 8–23)
CALCIUM SERPL-MCNC: 8.1 MG/DL (ref 8.7–10.5)
CHLORIDE SERPL-SCNC: 110 MMOL/L (ref 95–110)
CO2 SERPL-SCNC: 20 MMOL/L (ref 23–29)
CREAT SERPL-MCNC: 3 MG/DL (ref 0.5–1.4)
DIFFERENTIAL METHOD: ABNORMAL
EOSINOPHIL # BLD AUTO: 0 K/UL (ref 0–0.5)
EOSINOPHIL NFR BLD: 0 % (ref 0–8)
ERYTHROCYTE [DISTWIDTH] IN BLOOD BY AUTOMATED COUNT: 16.3 % (ref 11.5–14.5)
EST. GFR  (AFRICAN AMERICAN): 18 ML/MIN/1.73 M^2
EST. GFR  (NON AFRICAN AMERICAN): 15 ML/MIN/1.73 M^2
GLUCOSE SERPL-MCNC: 128 MG/DL (ref 70–110)
HCT VFR BLD AUTO: 29.1 % (ref 37–48.5)
HGB BLD-MCNC: 9.2 G/DL (ref 12–16)
IMM GRANULOCYTES # BLD AUTO: 0.15 K/UL (ref 0–0.04)
IMM GRANULOCYTES NFR BLD AUTO: 0.9 % (ref 0–0.5)
LACTATE SERPL-SCNC: 1.2 MMOL/L (ref 0.5–2.2)
LACTATE SERPL-SCNC: 2.1 MMOL/L (ref 0.5–2.2)
LYMPHOCYTES # BLD AUTO: 0.6 K/UL (ref 1–4.8)
LYMPHOCYTES NFR BLD: 4 % (ref 18–48)
MAGNESIUM SERPL-MCNC: 1.9 MG/DL (ref 1.6–2.6)
MCH RBC QN AUTO: 26.8 PG (ref 27–31)
MCHC RBC AUTO-ENTMCNC: 31.6 G/DL (ref 32–36)
MCV RBC AUTO: 85 FL (ref 82–98)
MONOCYTES # BLD AUTO: 1 K/UL (ref 0.3–1)
MONOCYTES NFR BLD: 6.5 % (ref 4–15)
NEUTROPHILS # BLD AUTO: 14.1 K/UL (ref 1.8–7.7)
NEUTROPHILS NFR BLD: 88.4 % (ref 38–73)
NRBC BLD-RTO: 0 /100 WBC
PHOSPHATE SERPL-MCNC: 5.3 MG/DL (ref 2.7–4.5)
PLATELET # BLD AUTO: 163 K/UL (ref 150–450)
PMV BLD AUTO: 11.4 FL (ref 9.2–12.9)
POCT GLUCOSE: 168 MG/DL (ref 70–110)
POTASSIUM SERPL-SCNC: 4 MMOL/L (ref 3.5–5.1)
PROT SERPL-MCNC: 6 G/DL (ref 6–8.4)
RBC # BLD AUTO: 3.43 M/UL (ref 4–5.4)
SODIUM SERPL-SCNC: 142 MMOL/L (ref 136–145)
TROPONIN I SERPL DL<=0.01 NG/ML-MCNC: 0.16 NG/ML (ref 0–0.03)
TROPONIN I SERPL DL<=0.01 NG/ML-MCNC: 0.17 NG/ML (ref 0–0.03)
WBC # BLD AUTO: 15.9 K/UL (ref 3.9–12.7)

## 2022-07-03 PROCEDURE — 83605 ASSAY OF LACTIC ACID: CPT | Performed by: HOSPITALIST

## 2022-07-03 PROCEDURE — 63600175 PHARM REV CODE 636 W HCPCS: Performed by: HOSPITALIST

## 2022-07-03 PROCEDURE — 11000001 HC ACUTE MED/SURG PRIVATE ROOM

## 2022-07-03 PROCEDURE — 25000003 PHARM REV CODE 250: Performed by: HOSPITALIST

## 2022-07-03 PROCEDURE — 99900035 HC TECH TIME PER 15 MIN (STAT)

## 2022-07-03 PROCEDURE — 80053 COMPREHEN METABOLIC PANEL: CPT | Performed by: HOSPITALIST

## 2022-07-03 PROCEDURE — 84484 ASSAY OF TROPONIN QUANT: CPT | Mod: 91 | Performed by: HOSPITALIST

## 2022-07-03 PROCEDURE — 84100 ASSAY OF PHOSPHORUS: CPT | Performed by: HOSPITALIST

## 2022-07-03 PROCEDURE — 99223 1ST HOSP IP/OBS HIGH 75: CPT | Mod: ,,, | Performed by: INTERNAL MEDICINE

## 2022-07-03 PROCEDURE — 94761 N-INVAS EAR/PLS OXIMETRY MLT: CPT

## 2022-07-03 PROCEDURE — 99223 PR INITIAL HOSPITAL CARE,LEVL III: ICD-10-PCS | Mod: ,,, | Performed by: INTERNAL MEDICINE

## 2022-07-03 PROCEDURE — 85025 COMPLETE CBC W/AUTO DIFF WBC: CPT | Performed by: HOSPITALIST

## 2022-07-03 PROCEDURE — 83735 ASSAY OF MAGNESIUM: CPT | Performed by: HOSPITALIST

## 2022-07-03 PROCEDURE — 25000003 PHARM REV CODE 250: Performed by: INTERNAL MEDICINE

## 2022-07-03 PROCEDURE — 84484 ASSAY OF TROPONIN QUANT: CPT | Performed by: HOSPITALIST

## 2022-07-03 PROCEDURE — 83605 ASSAY OF LACTIC ACID: CPT | Mod: 91 | Performed by: HOSPITALIST

## 2022-07-03 PROCEDURE — 36415 COLL VENOUS BLD VENIPUNCTURE: CPT | Performed by: HOSPITALIST

## 2022-07-03 RX ORDER — DICYCLOMINE HYDROCHLORIDE 20 MG/1
20 TABLET ORAL 3 TIMES DAILY PRN
Status: DISCONTINUED | OUTPATIENT
Start: 2022-07-03 | End: 2022-07-12 | Stop reason: HOSPADM

## 2022-07-03 RX ORDER — GLUCAGON 1 MG
1 KIT INJECTION
Status: DISCONTINUED | OUTPATIENT
Start: 2022-07-03 | End: 2022-07-12 | Stop reason: HOSPADM

## 2022-07-03 RX ORDER — NALOXONE HCL 0.4 MG/ML
0.02 VIAL (ML) INJECTION
Status: DISCONTINUED | OUTPATIENT
Start: 2022-07-03 | End: 2022-07-12 | Stop reason: HOSPADM

## 2022-07-03 RX ORDER — SODIUM CHLORIDE 9 MG/ML
INJECTION, SOLUTION INTRAVENOUS CONTINUOUS
Status: ACTIVE | OUTPATIENT
Start: 2022-07-03 | End: 2022-07-03

## 2022-07-03 RX ORDER — MECLIZINE HYDROCHLORIDE 25 MG/1
25 TABLET ORAL 3 TIMES DAILY PRN
Status: DISCONTINUED | OUTPATIENT
Start: 2022-07-03 | End: 2022-07-12 | Stop reason: HOSPADM

## 2022-07-03 RX ORDER — OXYCODONE HYDROCHLORIDE 5 MG/1
10 TABLET ORAL EVERY 8 HOURS PRN
Status: DISCONTINUED | OUTPATIENT
Start: 2022-07-03 | End: 2022-07-04

## 2022-07-03 RX ORDER — IBUPROFEN 200 MG
16 TABLET ORAL
Status: DISCONTINUED | OUTPATIENT
Start: 2022-07-03 | End: 2022-07-12 | Stop reason: HOSPADM

## 2022-07-03 RX ORDER — TALC
6 POWDER (GRAM) TOPICAL NIGHTLY PRN
Status: DISCONTINUED | OUTPATIENT
Start: 2022-07-03 | End: 2022-07-12 | Stop reason: HOSPADM

## 2022-07-03 RX ORDER — AMOXICILLIN 250 MG
1 CAPSULE ORAL 2 TIMES DAILY
Status: DISCONTINUED | OUTPATIENT
Start: 2022-07-03 | End: 2022-07-12 | Stop reason: HOSPADM

## 2022-07-03 RX ORDER — SODIUM CHLORIDE 0.9 % (FLUSH) 0.9 %
10 SYRINGE (ML) INJECTION
Status: DISCONTINUED | OUTPATIENT
Start: 2022-07-03 | End: 2022-07-12 | Stop reason: HOSPADM

## 2022-07-03 RX ORDER — BACLOFEN 5 MG/1
10 TABLET ORAL 2 TIMES DAILY PRN
Status: DISCONTINUED | OUTPATIENT
Start: 2022-07-03 | End: 2022-07-12 | Stop reason: HOSPADM

## 2022-07-03 RX ORDER — ACETAMINOPHEN 325 MG/1
650 TABLET ORAL EVERY 4 HOURS PRN
Status: DISCONTINUED | OUTPATIENT
Start: 2022-07-03 | End: 2022-07-12 | Stop reason: HOSPADM

## 2022-07-03 RX ORDER — IPRATROPIUM BROMIDE AND ALBUTEROL SULFATE 2.5; .5 MG/3ML; MG/3ML
3 SOLUTION RESPIRATORY (INHALATION) EVERY 6 HOURS PRN
Status: DISCONTINUED | OUTPATIENT
Start: 2022-07-03 | End: 2022-07-12 | Stop reason: HOSPADM

## 2022-07-03 RX ORDER — INSULIN ASPART 100 [IU]/ML
0-5 INJECTION, SOLUTION INTRAVENOUS; SUBCUTANEOUS
Status: DISCONTINUED | OUTPATIENT
Start: 2022-07-03 | End: 2022-07-12 | Stop reason: HOSPADM

## 2022-07-03 RX ORDER — SODIUM CHLORIDE 0.9 % (FLUSH) 0.9 %
10 SYRINGE (ML) INJECTION EVERY 12 HOURS PRN
Status: DISCONTINUED | OUTPATIENT
Start: 2022-07-03 | End: 2022-07-12 | Stop reason: HOSPADM

## 2022-07-03 RX ORDER — GABAPENTIN 300 MG/1
300 CAPSULE ORAL NIGHTLY
Status: DISCONTINUED | OUTPATIENT
Start: 2022-07-03 | End: 2022-07-12 | Stop reason: HOSPADM

## 2022-07-03 RX ORDER — LANOLIN ALCOHOL/MO/W.PET/CERES
800 CREAM (GRAM) TOPICAL
Status: DISCONTINUED | OUTPATIENT
Start: 2022-07-03 | End: 2022-07-03

## 2022-07-03 RX ORDER — SODIUM,POTASSIUM PHOSPHATES 280-250MG
2 POWDER IN PACKET (EA) ORAL
Status: DISCONTINUED | OUTPATIENT
Start: 2022-07-03 | End: 2022-07-03

## 2022-07-03 RX ORDER — PANTOPRAZOLE SODIUM 40 MG/1
40 TABLET, DELAYED RELEASE ORAL DAILY
Status: DISCONTINUED | OUTPATIENT
Start: 2022-07-03 | End: 2022-07-12 | Stop reason: HOSPADM

## 2022-07-03 RX ORDER — IBUPROFEN 200 MG
24 TABLET ORAL
Status: DISCONTINUED | OUTPATIENT
Start: 2022-07-03 | End: 2022-07-12 | Stop reason: HOSPADM

## 2022-07-03 RX ORDER — MAG HYDROX/ALUMINUM HYD/SIMETH 200-200-20
30 SUSPENSION, ORAL (FINAL DOSE FORM) ORAL 4 TIMES DAILY PRN
Status: DISCONTINUED | OUTPATIENT
Start: 2022-07-03 | End: 2022-07-12 | Stop reason: HOSPADM

## 2022-07-03 RX ORDER — METOCLOPRAMIDE HYDROCHLORIDE 5 MG/ML
10 INJECTION INTRAMUSCULAR; INTRAVENOUS EVERY 6 HOURS PRN
Status: DISCONTINUED | OUTPATIENT
Start: 2022-07-03 | End: 2022-07-12 | Stop reason: HOSPADM

## 2022-07-03 RX ORDER — LANOLIN ALCOHOL/MO/W.PET/CERES
1000 CREAM (GRAM) TOPICAL DAILY
Status: DISCONTINUED | OUTPATIENT
Start: 2022-07-03 | End: 2022-07-12 | Stop reason: HOSPADM

## 2022-07-03 RX ORDER — SIMETHICONE 80 MG
1 TABLET,CHEWABLE ORAL 4 TIMES DAILY PRN
Status: DISCONTINUED | OUTPATIENT
Start: 2022-07-03 | End: 2022-07-12 | Stop reason: HOSPADM

## 2022-07-03 RX ORDER — LOSARTAN POTASSIUM 50 MG/1
100 TABLET ORAL DAILY
Status: DISCONTINUED | OUTPATIENT
Start: 2022-07-03 | End: 2022-07-03

## 2022-07-03 RX ORDER — HEPARIN SODIUM 5000 [USP'U]/ML
5000 INJECTION, SOLUTION INTRAVENOUS; SUBCUTANEOUS EVERY 8 HOURS
Status: DISCONTINUED | OUTPATIENT
Start: 2022-07-03 | End: 2022-07-12 | Stop reason: HOSPADM

## 2022-07-03 RX ADMIN — SODIUM CHLORIDE, SODIUM LACTATE, POTASSIUM CHLORIDE, AND CALCIUM CHLORIDE 1000 ML: .6; .31; .03; .02 INJECTION, SOLUTION INTRAVENOUS at 01:07

## 2022-07-03 RX ADMIN — OXYCODONE 10 MG: 5 TABLET ORAL at 12:07

## 2022-07-03 RX ADMIN — OXYCODONE 10 MG: 5 TABLET ORAL at 06:07

## 2022-07-03 RX ADMIN — OXYCODONE 10 MG: 5 TABLET ORAL at 08:07

## 2022-07-03 RX ADMIN — METOPROLOL SUCCINATE 12.5 MG: 25 TABLET, EXTENDED RELEASE ORAL at 08:07

## 2022-07-03 RX ADMIN — SODIUM CHLORIDE: 0.9 INJECTION, SOLUTION INTRAVENOUS at 12:07

## 2022-07-03 RX ADMIN — HEPARIN SODIUM 5000 UNITS: 5000 INJECTION INTRAVENOUS; SUBCUTANEOUS at 09:07

## 2022-07-03 RX ADMIN — HEPARIN SODIUM 5000 UNITS: 5000 INJECTION INTRAVENOUS; SUBCUTANEOUS at 05:07

## 2022-07-03 RX ADMIN — PANTOPRAZOLE SODIUM 40 MG: 40 TABLET, DELAYED RELEASE ORAL at 08:07

## 2022-07-03 RX ADMIN — CYANOCOBALAMIN TAB 1000 MCG 1000 MCG: 1000 TAB at 08:07

## 2022-07-03 RX ADMIN — DOCUSATE SODIUM - SENNOSIDES 1 TABLET: 50; 8.6 TABLET, FILM COATED ORAL at 09:07

## 2022-07-03 RX ADMIN — SODIUM CHLORIDE: 0.9 INJECTION, SOLUTION INTRAVENOUS at 03:07

## 2022-07-03 RX ADMIN — MEROPENEM 1 G: 1 INJECTION, POWDER, FOR SOLUTION INTRAVENOUS at 10:07

## 2022-07-03 RX ADMIN — MEROPENEM 1 G: 1 INJECTION, POWDER, FOR SOLUTION INTRAVENOUS at 12:07

## 2022-07-03 NOTE — ASSESSMENT & PLAN NOTE
ER physician discussed the case with Dr. Perez with neuroendocrine services  Continue follow up outpatient as scheduled

## 2022-07-03 NOTE — ED NOTES
Pt arrives via EMS from home w/ c/o fatigue, abdominal pain w/ nausea and vomiting, right arm pain, and right leg pain x 1 day. Pt reports falling at home last night. Denies head trauma. Pt lethargic, responds to questions with repeated stimulation. Hx of multiple carcinoid tumors, CKD, CHF, and anemia. [facility] making home visits. Pt prescribed Keflex yesterday for UTI. Pt denies chest pain and SOB. NAD noted. Will continue to monitor.    APPEARANCE: Alert, oriented and in no acute distress.  CARDIAC: Normal rate. Pt denies chest pain.   PERIPHERAL VASCULAR: Normal cap refill. No edema. Warm to touch.    RESPIRATORY: Respirations are even and unlabored. Normal rate. Pt denies SOB. Symmetrical chest expansion bilaterally. No obvious signs of distress.  GASTRO: Abdominal pain, vomiting. Abdomen soft, non-tender, no distention.  MUSC: Lethargic. Right arm and leg pain. Full ROM. No bony tenderness or soft tissue tenderness. No obvious deformity.  SKIN: Skin is warm and dry.  MENTAL STATUS: Lethargic, opens eyes to voice.

## 2022-07-03 NOTE — PLAN OF CARE
Problem: Adult Inpatient Plan of Care  Goal: Plan of Care Review  Outcome: Ongoing, Progressing   Pt AAOX4. Safety precautions maintained. Bed low and locked, call light within reach. Medications administered per MAR. Pt tolerating diet well. DVT prophylaxis continued. Purewick in place. IV abx and IVF continued. Hourly rounding performed. Encouraged to call for OOB assistance. Awaiting pt disposition. Pt updated on plan of care and verbalizes understanding.

## 2022-07-03 NOTE — ASSESSMENT & PLAN NOTE
No signs of decompensation  Cont metoprolol  TTE pending due to elevated troponin  Monitor I&O

## 2022-07-03 NOTE — ED PROVIDER NOTES
Encounter Date: 2022       History     Chief Complaint   Patient presents with    Weakness     Pt presents to ED today via [facility] from home, pt c/o generalized weakness, pain all over body, dizziness upon standing last night, and reports fall.      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  Patient has had 1-2 days of generalized weakness, total body pain.  She had a syncopal event last night.  EMS reports that family states she had an episode of vomiting and complained of abdominal pain as well.  Further history is limited as the patient arrives very somnolent and is minimally conversant    The history is provided by the patient, the EMS personnel and medical records.     Review of patient's allergies indicates:   Allergen Reactions    Contrast media Hives, Itching and Swelling    Epinephrine Anaphylaxis     Can cause  a Carcinoid Crisis    Ibuprofen Hives, Itching and Swelling    Zofran [ondansetron hcl] Itching     And multiple other reactions    Iodinated contrast media     Morphine Other (See Comments)    Sulfa (sulfonamide antibiotics) Hives, Itching and Swelling     Past Medical History:   Diagnosis Date    Allergy     Arthritis     Cataract     Chronic diastolic (congestive) heart failure     Colon cancer     Encounter for blood transfusion     History of ESBL E. 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)      Past Surgical History:   Procedure Laterality Date    ABDOMINAL SURGERY      CATARACT EXTRACTION Left 10/2017     SECTION      CHOLECYSTECTOMY      COLON SURGERY      cystoscope      CYSTOSCOPY W/  RETROGRADES Right 10/10/2019    Procedure: CYSTOSCOPY, WITH RETROGRADE PYELOGRAM;  Surgeon: Gen Isbell MD;  Location: [facility] OR;  Service: Urology;  Laterality: Right;    ERCP N/A 11/24/2021    Procedure: ERCP (ENDOSCOPIC RETROGRADE CHOLANGIOPANCREATOGRAPHY);  Surgeon: Jayjay Rivera MD;  Location: [facility] ENDO (2ND FLR);  Service: Endoscopy;  Laterality: N/A;    ERCP N/A 3/4/2022    Procedure: ERCP (ENDOSCOPIC RETROGRADE CHOLANGIOPANCREATOGRAPHY);  Surgeon: Roby Virgen MD;  Location: [facility] ENDO (2ND FLR);  Service: Endoscopy;  Laterality: N/A;    EYE SURGERY      HYSTERECTOMY  5/1996    LITHOTRIPSY      LIVER BIOPSY  9/14    carcinoid    URETEROSCOPY Right 10/10/2019    Procedure: URETEROSCOPY;  Surgeon: Gen Isbell MD;  Location: [facility] OR;  Service: Urology;  Laterality: Right;    UTERINE FIBROID SURGERY       Family History   Problem Relation Age of Onset    Cancer Mother         unknown    Alzheimer's disease Father     Stroke Sister     No Known Problems Son     No Known Problems Son     No Known Problems Son     No Known Problems Son     Kidney disease Neg Hx      Social History     Tobacco Use    Smoking status: Never Smoker    Smokeless tobacco: Never Used   Substance Use Topics    Alcohol use: No     Alcohol/week: 0.0 standard drinks    Drug use: No     Review of Systems   Unable to perform ROS: Mental status change   Gastrointestinal: Positive for abdominal pain.   Musculoskeletal: Positive for myalgias.   Neurological: Positive for syncope and weakness.       Physical Exam     Initial Vitals [07/02/22 1858]   BP Pulse Resp Temp SpO2   (!) 132/94 98 16 97.9 °F (36.6 °C) 100 %      MAP       --         Physical Exam    Nursing note and vitals reviewed.  Constitutional: She appears well-developed and well-nourished. She appears distressed.   Chronically ill-appearing, somnolent, arousable   HENT:   Head: Normocephalic and atraumatic.   Mouth/Throat: Oropharynx is clear and  moist.   Eyes: Conjunctivae and EOM are normal. Pupils are equal, round, and reactive to light.   Neck: Neck supple.   Normal range of motion.  Cardiovascular: Normal rate, regular rhythm and intact distal pulses.   Pulmonary/Chest: Breath sounds normal. No stridor. No respiratory distress.   Abdominal: Abdomen is soft. She exhibits no distension. There is abdominal tenderness.   Musculoskeletal:         General: Normal range of motion.      Cervical back: Normal range of motion and neck supple.      Comments: Moving all extremities with grossly equal strength     Neurological:   CN 2-12 appear grossly intact.  Patient is very drowsy and arousable to verbal stimuli.  Minimally intelligible speech.  Follows commands.  Globally decreased strength but equal among all extremities   Skin: Skin is warm and dry.         ED Course   Procedures  Labs Reviewed   CBC W/ AUTO DIFFERENTIAL - Abnormal; Notable for the following components:       Result Value    WBC 22.03 (*)     Hemoglobin 11.7 (*)     MCH 26.5 (*)     MCHC 30.7 (*)     RDW 16.5 (*)     Immature Granulocytes 1.5 (*)     Gran # (ANC) 19.7 (*)     Immature Grans (Abs) 0.33 (*)     Lymph # 0.7 (*)     Mono # 1.2 (*)     Gran % 89.4 (*)     Lymph % 3.4 (*)     All other components within normal limits   COMPREHENSIVE METABOLIC PANEL - Abnormal; Notable for the following components:    CO2 19 (*)     Glucose 132 (*)     BUN 38 (*)     Creatinine 3.9 (*)     Albumin 3.2 (*)     Alkaline Phosphatase 185 (*)      (*)     ALT 47 (*)     Anion Gap 22 (*)     eGFR if  13 (*)     eGFR if non  11 (*)     All other components within normal limits   URINALYSIS, REFLEX TO URINE CULTURE - Abnormal; Notable for the following components:    Appearance, UA Hazy (*)     Protein, UA 2+ (*)     Occult Blood UA 3+ (*)     Leukocytes, UA 3+ (*)     All other components within normal limits    Narrative:     Specimen Source->Urine   TROPONIN I -  Abnormal; Notable for the following components:    Troponin I 0.279 (*)     All other components within normal limits   LACTIC ACID, PLASMA - Abnormal; Notable for the following components:    Lactate (Lactic Acid) 5.2 (*)     All other components within normal limits    Narrative:      Lactic Acid  critical result(s) called and verbal readback obtained   from Rahul COOPER RN by [facility] 07/02/2022 21:44   URINALYSIS MICROSCOPIC - Abnormal; Notable for the following components:    WBC, UA >100 (*)     WBC Clumps, UA Few (*)     Bacteria Many (*)     Yeast, UA Few (*)     All other components within normal limits    Narrative:     Specimen Source->Urine   CULTURE, URINE   CULTURE, BLOOD   CULTURE, BLOOD   MAGNESIUM   SARS-COV-2 RDRP GENE    Narrative:     This test utilizes isothermal nucleic acid amplification   technology to detect the SARS-CoV-2 RdRp nucleic acid segment.   The analytical sensitivity (limit of detection) is 125 genome   equivalents/mL.   A POSITIVE result implies infection with the SARS-CoV-2 virus;   the patient is presumed to be contagious.     A NEGATIVE result means that SARS-CoV-2 nucleic acids are not   present above the limit of detection. A NEGATIVE result should be   treated as presumptive. It does not rule out the possibility of   COVID-19 and should not be the sole basis for treatment decisions.   If COVID-19 is strongly suspected based on clinical and exposure   history, re-testing using an alternate molecular assay should be   considered.   This test is only for use under the Food and Drug   Administration s Emergency Use Authorization (EUA).   Commercial kits are provided by [email or web address].   Performance characteristics of the EUA have been independently   verified by [facility] Department of   Pathology and Laboratory Medicine.   _________________________________________________________________   The authorized Fact Sheet for Healthcare Providers and the authorized Fact    Sheet for Patients of the ID NOW COVID-19 are available on the FDA   website:     https://www.fda.gov/media/962870/download  https://www.fda.gov/media/093627/download             EKG Readings: (Independently Interpreted)   Initial Reading: No STEMI. Rhythm: Sinus Tachycardia. Heart Rate: 101. Ectopy: No Ectopy. Conduction: Normal. ST Segments: Normal ST Segments. T Waves: Normal. Axis: Normal. Clinical Impression: Sinus Tachycardia       Imaging Results          CT Abdomen Pelvis  Without Contrast (Final result)  Result time 07/02/22 22:57:46   Procedure changed from CT Abdomen Pelvis With Contrast     Final result by Bennie Kiser DO (07/02/22 22:57:46)                 Impression:      1. No acute abnormality of the abdomen or pelvis.  2. Unchanged size of the partially calcified lesion in the right upper quadrant mesentery, in keeping with patient's reported history of carcinoid.  3. Ill-defined hepatic masses compatible with hepatic metastatic disease, overall better evaluated on prior MRI examinations.  4. Mildly enlarged cardiophrenic and retroperitoneal lymph nodes as above, stable.      Electronically signed by: Bennie Kiser  Date:    07/02/2022  Time:    22:57             Narrative:    EXAMINATION:  CT ABDOMEN PELVIS WITHOUT CONTRAST    CLINICAL HISTORY:  Abdominal abscess/infection suspected;Nausea/vomiting;Abdominal pain, acute, nonlocalized;    TECHNIQUE:  Multiplanar images were obtained of the abdomen and pelvis from the hemidiaphragms through the symphysis pubis without intravenous contrast.    COMPARISON:  CT of the abdomen and pelvis from 05/30/2022.  MRI from 05/16/2022.    FINDINGS:  Lung Bases: Clear.    Heart: Heart size is normal.  No pericardial effusion.    Liver: There is an ill-defined 2.9 cm hypodensity in the right hepatic lobe (series 2, image 30), previously measuring 2.6 cm on MRI from 05/16/2022.   There are calcified lesions in the left hepatic lobe, similar to prior.  Multiple  additional hepatic lesions are not well seen on this noncontrast CT, better seen on prior MRI.    Biliary tract: There is pneumobilia, unchanged.  There is no intra or extrahepatic biliary ductal dilation.    Gallbladder: Surgically absent.    Pancreas: There is mild fatty atrophy of the pancreas.  There are no focal lesions in the pancreas.  There is no pancreatic ductal dilation.    Spleen: Normal size without focal lesion.    Adrenals: Normal.    Kidneys and urinary collecting systems: There are multiple nonobstructing bilateral renal calculi, including a large calculus in the lower pole right kidney extending into the calices, similar appearance to prior.  There is no hydronephrosis or obstructing stone.    Lymph nodes: There is a cardiophrenic lymph node measuring 1.0 cm in short axis (series 2, image 20), stable in size from prior.  There is an 8 mm aortocaval lymph node (series 2, image 67), unchanged in size from prior.  Several additional prominent retroperitoneal nodes are seen.    Stomach and bowel: The stomach is normal.  There are postop changes of partial colectomy.  The anastomosis is unremarkable.  There is no bowel wall thickening or pneumatosis.  No obstruction.    Peritoneum and mesentery: Again seen is a partially calcified mass in the right upper quadrant adjacent to the SMA (series 2, image 89), similar in size and appearance to prior.    Vasculature: Normal.    Urinary bladder: There is a focus of air in the urinary bladder which may be related to prior instrumentation, correlate clinically.    Reproductive organs: The uterus is absent.    Body wall: No abnormality.    Musculoskeletal: No aggressive osseous lesion.                               X-Ray Chest AP Portable (Final result)  Result time 07/02/22 22:35:59    Final result by Bennie Kiser DO (07/02/22 22:35:59)                 Impression:      No acute cardiopulmonary abnormality.      Electronically signed by: Bennie  Margi  Date:    07/02/2022  Time:    22:35             Narrative:    EXAMINATION:  XR CHEST AP PORTABLE    CLINICAL HISTORY:  ams;    TECHNIQUE:  Single frontal view of the chest was performed.    COMPARISON:  Chest radiograph 03/06/2022.    FINDINGS:  The lungs are well expanded and clear. No focal opacities are seen. The pleural spaces are clear.    The cardiac silhouette is unremarkable.    The visualized osseous structures are unremarkable.                              X-Rays:   Independently Interpreted Readings:   Other Readings:  Imaging Results          CT Abdomen Pelvis  Without Contrast (Final result)  Result time 07/02/22   22:57:46   Procedure changed from CT Abdomen Pelvis With Contrast     Final result by Bennie Kiser DO (07/02/22 22:57:46)                 Impression:      1. No acute abnormality of the abdomen or pelvis.  2. Unchanged size of the partially calcified lesion in the right upper   quadrant mesentery, in keeping with patient's reported history of   carcinoid.  3. Ill-defined hepatic masses compatible with hepatic metastatic disease,   overall better evaluated on prior MRI examinations.  4. Mildly enlarged cardiophrenic and retroperitoneal lymph nodes as above,   stable.      Electronically signed by: Bennie Kiser  Date:    07/02/2022  Time:    22:57             Narrative:    EXAMINATION:  CT ABDOMEN PELVIS WITHOUT CONTRAST    CLINICAL HISTORY:  Abdominal abscess/infection suspected;Nausea/vomiting;Abdominal pain,   acute, nonlocalized;    TECHNIQUE:  Multiplanar images were obtained of the abdomen and pelvis from the   hemidiaphragms through the symphysis pubis without intravenous contrast.    COMPARISON:  CT of the abdomen and pelvis from 05/30/2022.  MRI from 05/16/2022.    FINDINGS:  Lung Bases: Clear.    Heart: Heart size is normal.  No pericardial effusion.    Liver: There is an ill-defined 2.9 cm hypodensity in the right hepatic   lobe (series 2, image 30), previously  measuring 2.6 cm on MRI from   05/16/2022.   There are calcified lesions in the left hepatic lobe,   similar to prior.  Multiple additional hepatic lesions are not well seen   on this noncontrast CT, better seen on prior MRI.    Biliary tract: There is pneumobilia, unchanged.  There is no intra or   extrahepatic biliary ductal dilation.    Gallbladder: Surgically absent.    Pancreas: There is mild fatty atrophy of the pancreas.  There are no focal   lesions in the pancreas.  There is no pancreatic ductal dilation.    Spleen: Normal size without focal lesion.    Adrenals: Normal.    Kidneys and urinary collecting systems: There are multiple nonobstructing   bilateral renal calculi, including a large calculus in the lower pole   right kidney extending into the calices, similar appearance to prior.    There is no hydronephrosis or obstructing stone.    Lymph nodes: There is a cardiophrenic lymph node measuring 1.0 cm in short   axis (series 2, image 20), stable in size from prior.  There is an 8 mm   aortocaval lymph node (series 2, image 67), unchanged in size from prior.    Several additional prominent retroperitoneal nodes are seen.    Stomach and bowel: The stomach is normal.  There are postop changes of   partial colectomy.  The anastomosis is unremarkable.  There is no bowel   wall thickening or pneumatosis.  No obstruction.    Peritoneum and mesentery: Again seen is a partially calcified mass in the   right upper quadrant adjacent to the SMA (series 2, image 89), similar in   size and appearance to prior.    Vasculature: Normal.    Urinary bladder: There is a focus of air in the urinary bladder which may   be related to prior instrumentation, correlate clinically.    Reproductive organs: The uterus is absent.    Body wall: No abnormality.    Musculoskeletal: No aggressive osseous lesion.                               X-Ray Chest AP Portable (Final result)  Result time 07/02/22 22:35:59    Final result by Bennie  OKSANA Kiser DO (07/02/22 22:35:59)                 Impression:      No acute cardiopulmonary abnormality.      Electronically signed by: Bennie Margi  Date:    07/02/2022  Time:    22:35             Narrative:    EXAMINATION:  XR CHEST AP PORTABLE    CLINICAL HISTORY:  ams;    TECHNIQUE:  Single frontal view of the chest was performed.    COMPARISON:  Chest radiograph 03/06/2022.    FINDINGS:  The lungs are well expanded and clear. No focal opacities are seen. The   pleural spaces are clear.    The cardiac silhouette is unremarkable.    The visualized osseous structures are unremarkable.                                Medications   meropenem 1 g in sodium chloride 0.9 % 100 mL IVPB (ready to mix system) (has no administration in time range)   sodium chloride 0.9% bolus 1,000 mL (1,000 mLs Intravenous New Bag 7/2/22 2240)   sodium chloride 0.9% bolus 1,000 mL (0 mLs Intravenous Stopped 7/2/22 2121)   cefTRIAXone (ROCEPHIN) 1 g/50 mL D5W IVPB (0 g Intravenous Stopped 7/2/22 2310)     Medical Decision Making:   History:   Old Medical Records: I decided to obtain old medical records.  Initial Assessment:   Chronically ill-appearing, normal vitals  Differential Diagnosis:   Effects of chemotherapy, UTI, carcinoid tumor, electrolyte derangement, dehydration, obstructive process  Independently Interpreted Test(s):   I have ordered and independently interpreted EKG Reading(s) - see prior notes  Clinical Tests:   Lab Tests: Ordered and Reviewed       <> Summary of Lab: Leukocytosis, FLORECITA, UTI, lactic acidosis  Radiological Study: Ordered and Reviewed  Medical Tests: Ordered and Reviewed  ED Management:  Case discuss with neuroendocrine surgical service Dr. Perez requesting medicine admission.  Case discussed with hospital medicine Dr. Calix for admission.  Review of the patient's previous microbiology reports show multi drug-resistant Klebsiella for her past to urinary tract infections.  Sensitive only to carbapenems.   Meropenem ordered.  Patient given 2 L of fluid.  Her blood pressure is stable.  Given her history of CHF this should be an appropriate fluid bolus at this time.      Pt. diagnosed with:  Severe sepsis with FLORECITA  Critical care time spent: 45  minutes.  Due to a high probability of clinically significant, life threatening deterioration, the patient required my highest level of preparedness to intervene emergently and I personally spent this critical care time directly and personally managing the patient. This critical care time included obtaining a history; examining the patient; pulse oximetry; ordering and review of studies; arranging urgent treatment with development of a management plan; evaluation of patient's response to treatment; frequent reassessment; and, discussions with other providers.  This critical care time was performed to assess and manage the high probability of imminent, life-threatening deterioration that could result in multi-organ failure. It was exclusive of separately billable procedures and treating other patients and teaching time.                        Clinical Impression:   Final diagnoses:  [R53.1] Weakness  [A41.9, R65.20] Severe sepsis (Primary)  [N39.0] Urinary tract infection without hematuria, site unspecified  [N17.9] FLORECITA (acute kidney injury)          ED Disposition Condition    Admit               Eleno Wan, DO  07/02/22 [id number]       Eleno Wan, DO  07/02/22 [id number]

## 2022-07-03 NOTE — PLAN OF CARE
Problem: Adult Inpatient Plan of Care  Goal: Plan of Care Review  Outcome: Ongoing, Progressing     VIRTUAL NURSE:  Cued into patient's room.  Permission received per patient to turn camera to view patient.  Introduced as VN for night shift that will be working with floor nurse and nursing assistant.  Educated patient on VN's role in patient care and  [facility] model.  Plan of care reviewed with patient.  Education per flowsheet.   Informed patient that staff will round on them every 2 hours but to use call light for any other needs they may have; informed of fall risk and fall precautions.  Patient verbalized understanding.  Call light within reach; bed siderails up x3.  Opportunity given for questions and questions answered.  Admission assessment questions answered.  Will cont to monitor and intervene as needed.    Labs, notes, orders, and careplan reviewed.       07/03/22 0129   Patient Request   Patient Requested frequently falling back asleep; no complaints or needs currently   Admission   Initial VN Admission Questions Complete   Communication Issues? Technical Issue   Shift   Virtual Nurse - Rounding Complete   Pain Management Interventions pain management plan reviewed with patient/caregiver   Virtual Nurse - Patient Verbalized Approval Of Camera Use;VN Rounding   Type of Frequent Check   Type Patient Rounds   Safety/Activity   Patient Rounds bed in low position;placement of personal items at bedside;bed wheels locked;call light in patient/parent reach;visualized patient;clutter free environment maintained   Safety Promotion/Fall Prevention assistive device/personal item within reach;diversional activities provided;Fall Risk reviewed with patient/family;family to remain at bedside;high risk medications identified;medications reviewed;nonskid shoes/socks when out of bed;room near unit station;side rails raised x 3;supervised activity;instructed to call staff for mobility   Safety Precautions emergency equipment  at bedside   Positioning   Body Position left;side-lying   Head of Bed (HOB) Positioning HOB at 60 degrees   Pain/Comfort/Sleep   Preferred Pain Scale number (Numeric Rating Pain Scale)   Comfort/Acceptable Pain Level 0   Pain Rating (0-10): Rest 0   POSS ([place]ero Opioid-Induced Sed Scale) 3 - Frequently drowsy, arousable, drifts off to sleep during conversation

## 2022-07-03 NOTE — HPI
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 She has also noticed abd pain, n/v, increased urinary frequency/urgency and fell at home last night but denies LOC. Pt denies chest pain, sob, palpitations, diarrhea, constipation. Was prescribed keflex yesterday for UTI. Pt noted to be very somnolent and minimally conversant upon arrival to ED. Workup significant for +UA, leukocytosis, lactic acidosis, FLORECITA, elevated troponin. CT abd/pelvis with no acute findings; unchanged size of carcinoid tumor with liver mets and lymphadenopathy. Started on meropenem for UTI due to recent urine culture growing ESBL klebsiella. Mental status has since improved and patient now alert and oriented x4. Patient will be admitted to hospital medicine service for further management.

## 2022-07-03 NOTE — SUBJECTIVE & OBJECTIVE
Past Medical History:   Diagnosis Date    Allergy     Arthritis     Cataract     Chronic diastolic (congestive) heart failure     Colon cancer     Encounter for blood transfusion     History of ESBL E. 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)        Past Surgical History:   Procedure Laterality Date    ABDOMINAL SURGERY      CATARACT EXTRACTION Left 10/2017     SECTION      CHOLECYSTECTOMY      COLON SURGERY      cystoscope      CYSTOSCOPY W/ RETROGRADES Right 10/10/2019    Procedure: CYSTOSCOPY, WITH RETROGRADE PYELOGRAM;  Surgeon: Gen Isbell MD;  Location: [facility];  Service: Urology;  Laterality: Right;    ERCP N/A 2021    Procedure: ERCP (ENDOSCOPIC RETROGRADE CHOLANGIOPANCREATOGRAPHY);  Surgeon: Jayjay Rivera MD;  [facility]  Service: Endoscopy;  Laterality: N/A;    ERCP N/A 3/4/2022    Procedure: ERCP (ENDOSCOPIC RETROGRADE CHOLANGIOPANCREATOGRAPHY);  Surgeon: Roby Virgen MD;  [facility]  Service: Endoscopy;  Laterality: N/A;    EYE SURGERY      HYSTERECTOMY  1996    LITHOTRIPSY      LIVER BIOPSY      carcinoid    URETEROSCOPY Right 10/10/2019    Procedure: URETEROSCOPY;  Surgeon: Gen Isbell MD;  Location: [facility] OR;  Service: Urology;  Laterality: Right;    UTERINE FIBROID SURGERY         Review of patient's allergies indicates:   Allergen Reactions    Contrast media Hives, Itching and Swelling    Epinephrine Anaphylaxis     Can cause  a Carcinoid Crisis    Ibuprofen Hives, Itching and Swelling    Zofran [ondansetron hcl] Itching     And multiple other reactions    Iodinated contrast media     Morphine Other (See Comments)    Sulfa (sulfonamide antibiotics) Hives, Itching and Swelling        No current facility-administered medications on file prior to encounter.     Current Outpatient Medications on File Prior to Encounter   Medication Sig    ascorbic acid, vitamin C, (VITAMIN C) 1000 MG tablet Take 1 tablet (1,000 mg total) by mouth 2 (two) times daily.    atorvastatin (LIPITOR) 40 MG tablet Take 1 tablet (40 mg total) by mouth every evening.    baclofen (LIORESAL) 10 MG tablet Take 1 tablet (10 mg total) by mouth 2 (two) times daily as needed (muscle cramps).    cephALEXin (KEFLEX) 500 MG capsule Take 1 capsule (500 mg total) by mouth every 8 (eight) hours. for 7 days    cyanocobalamin (VITAMIN B-12) 1000 MCG tablet Take 1 tablet (1,000 mcg total) by mouth once daily.    diclofenac sodium (VOLTAREN) 1 % Gel Apply 2 g topically 4 (four) times daily as needed (area of pain).    dicyclomine (BENTYL) 20 mg tablet TAKE 1 TABLET(20 MG) BY MOUTH THREE TIMES DAILY AS NEEDED FOR ABDOMINAL PAIN    diphenoxylate-atropine 2.5-0.025 mg (LOMOTIL) 2.5-0.025 mg per tablet Take 1 tablet by mouth 4 (four) times daily as needed.    gabapentin (NEURONTIN) 300 MG capsule Take 1 capsule (300 mg total) by mouth every evening.    LIDOcaine (LIDODERM) 5 % Place 1 patch onto the skin once daily. Remove & Discard patch within 12 hours or as directed by MD    losartan (COZAAR) 100 MG tablet TAKE 1 TABLET (100 MG TOTAL) BY MOUTH ONCE DAILY.    magnesium oxide (MAG-OX) 400 mg (241.3 mg magnesium) tablet Take 1 tablet (400 mg total) by mouth 2 (two) times daily.    meclizine (ANTIVERT) 25 mg tablet Take 1 tablet (25 mg total) by mouth 3 (three) times daily as needed for Dizziness.    methenamine (HIPREX) 1 gram Tab Take 1 tablet (1 g total) by mouth 2 (two) times daily.    metoprolol succinate (TOPROL-XL) 25 MG 24 hr tablet Take 0.5 tablets (12.5 mg total) by mouth once daily.    oxyCODONE (ROXICODONE) 10 mg Tab immediate release tablet Take 1 tablet (10 mg total) by mouth every 8 (eight) hours as needed for Pain.     pantoprazole (PROTONIX) 40 MG tablet Take 1 tablet (40 mg total) by mouth once daily.    phenazopyridine (PYRIDIUM) 100 MG tablet Take 1 tablet (100 mg total) by mouth 3 (three) times daily as needed for Pain.     Family History       Problem Relation (Age of Onset)    Alzheimer's disease Father    Cancer Mother    No Known Problems Son, Son, Son, Son    Stroke Sister          Tobacco Use    Smoking status: Never Smoker    Smokeless tobacco: Never Used   Substance and Sexual Activity    Alcohol use: No     Alcohol/week: 0.0 standard drinks    Drug use: No    Sexual activity: Not Currently     Review of Systems   Constitutional:  Positive for activity change, chills, fatigue and fever. Negative for appetite change and diaphoresis.   HENT:  Negative for congestion, postnasal drip, rhinorrhea, sinus pressure, sinus pain and sneezing.    Respiratory:  Negative for cough, chest tightness, shortness of breath, wheezing and stridor.    Cardiovascular:  Negative for chest pain, palpitations and leg swelling.   Gastrointestinal:  Positive for abdominal pain, nausea and vomiting. Negative for abdominal distention, blood in stool, constipation and diarrhea.   Endocrine: Negative for cold intolerance and heat intolerance.   Genitourinary:  Positive for dysuria, frequency and urgency. Negative for flank pain and hematuria.   Musculoskeletal:  Negative for gait problem.   Neurological:  Positive for weakness. Negative for dizziness.   Psychiatric/Behavioral:  Negative for agitation and behavioral problems.    Objective:     Vital Signs (Most Recent):  Temp: 97.8 °F (36.6 °C) (07/03/22 0904)  Pulse: 102 (07/03/22 1000)  Resp: 20 (07/03/22 1000)  BP: (!) 148/65 (07/03/22 0904)  SpO2: 98 % (07/03/22 1000)   Vital Signs (24h Range):  Temp:  [97.6 °F (36.4 °C)-99 °F (37.2 °C)] 97.8 °F (36.6 °C)  Pulse:  [] 102  Resp:  [16-22] 20  SpO2:  [96 %-100 %] 98 %  BP: (127-162)/(60-94) 148/65     Weight: 72.6 kg (160 lb 0.9 oz)  Body  mass index is 25.83 kg/m².    Physical Exam  Constitutional:       General: She is not in acute distress.     Appearance: She is well-developed. She is ill-appearing and toxic-appearing.   HENT:      Head: Normocephalic and atraumatic.   Eyes:      Extraocular Movements: EOM normal.      Pupils: Pupils are equal, round, and reactive to light.   Neck:      Vascular: No JVD.   Cardiovascular:      Rate and Rhythm: Regular rhythm. Tachycardia present.      Heart sounds: Normal heart sounds. No murmur heard.    No friction rub. No gallop.   Pulmonary:      Effort: Pulmonary effort is normal. No respiratory distress.      Breath sounds: Normal breath sounds. No stridor. No rales.   Abdominal:      General: Bowel sounds are normal. There is no distension.      Palpations: Abdomen is soft.      Tenderness: There is abdominal tenderness (diffuse). There is no guarding.   Musculoskeletal:         General: No tenderness. Normal range of motion.      Cervical back: Normal range of motion and neck supple.      Right lower leg: No edema.      Left lower leg: No edema.   Skin:     General: Skin is warm and dry.   Neurological:      General: No focal deficit present.      Mental Status: She is alert and oriented to person, place, and time.      Cranial Nerves: No cranial nerve deficit.      Comments: lethargic   Psychiatric:         Behavior: Behavior normal.         CRANIAL NERVES     CN III, IV, VI   Pupils are equal, round, and reactive to light.  Extraocular motions are normal.      Significant Labs: All pertinent labs within the past 24 hours have been reviewed.    Significant Imaging: I have reviewed all pertinent imaging results/findings within the past 24 hours.

## 2022-07-03 NOTE — ASSESSMENT & PLAN NOTE
Active condition with metastatic disease to liver.  She is getting chemotherapy.  She has had nausea vomiting abdominal pain and diarrhea as an outpatient.  Fluid resuscitation on board.

## 2022-07-03 NOTE — SUBJECTIVE & OBJECTIVE
Past Medical History:   Diagnosis Date    Allergy     Arthritis     Cataract     Chronic diastolic (congestive) heart failure     Colon cancer     Encounter for blood transfusion     History of ESBL E. 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)        Past Surgical History:   Procedure Laterality Date    ABDOMINAL SURGERY      CATARACT EXTRACTION Left 10/2017     SECTION      CHOLECYSTECTOMY      COLON SURGERY      cystoscope      CYSTOSCOPY W/ RETROGRADES Right 10/10/2019    Procedure: CYSTOSCOPY, WITH RETROGRADE PYELOGRAM;  Surgeon: Gen Isbell MD;  Location: [facility];  Service: Urology;  Laterality: Right;    ERCP N/A 2021    Procedure: ERCP (ENDOSCOPIC RETROGRADE CHOLANGIOPANCREATOGRAPHY);  Surgeon: Jayjay Rivera MD;  [facility]  Service: Endoscopy;  Laterality: N/A;    ERCP N/A 3/4/2022    Procedure: ERCP (ENDOSCOPIC RETROGRADE CHOLANGIOPANCREATOGRAPHY);  Surgeon: Roby Virgen MD;  [facility]  Service: Endoscopy;  Laterality: N/A;    EYE SURGERY      HYSTERECTOMY  1996    LITHOTRIPSY      LIVER BIOPSY      carcinoid    URETEROSCOPY Right 10/10/2019    Procedure: URETEROSCOPY;  Surgeon: Gen Isbell MD;  Location: [facility] OR;  Service: Urology;  Laterality: Right;    UTERINE FIBROID SURGERY         Review of patient's allergies indicates:   Allergen Reactions    Contrast media Hives, Itching and Swelling    Epinephrine Anaphylaxis     Can cause  a Carcinoid Crisis    Ibuprofen Hives, Itching and Swelling    Zofran [ondansetron hcl] Itching     And multiple other reactions    Iodinated contrast media     Morphine Other (See Comments)    Sulfa (sulfonamide antibiotics) Hives, Itching and Swelling        No current facility-administered medications on file prior to encounter.     Current Outpatient Medications on File Prior to Encounter   Medication Sig    ascorbic acid, vitamin C, (VITAMIN C) 1000 MG tablet Take 1 tablet (1,000 mg total) by mouth 2 (two) times daily.    atorvastatin (LIPITOR) 40 MG tablet Take 1 tablet (40 mg total) by mouth every evening.    baclofen (LIORESAL) 10 MG tablet Take 1 tablet (10 mg total) by mouth 2 (two) times daily as needed (muscle cramps).    cephALEXin (KEFLEX) 500 MG capsule Take 1 capsule (500 mg total) by mouth every 8 (eight) hours. for 7 days    cyanocobalamin (VITAMIN B-12) 1000 MCG tablet Take 1 tablet (1,000 mcg total) by mouth once daily.    diclofenac sodium (VOLTAREN) 1 % Gel Apply 2 g topically 4 (four) times daily as needed (area of pain).    dicyclomine (BENTYL) 20 mg tablet TAKE 1 TABLET(20 MG) BY MOUTH THREE TIMES DAILY AS NEEDED FOR ABDOMINAL PAIN    diphenoxylate-atropine 2.5-0.025 mg (LOMOTIL) 2.5-0.025 mg per tablet Take 1 tablet by mouth 4 (four) times daily as needed.    gabapentin (NEURONTIN) 300 MG capsule Take 1 capsule (300 mg total) by mouth every evening.    LIDOcaine (LIDODERM) 5 % Place 1 patch onto the skin once daily. Remove & Discard patch within 12 hours or as directed by MD    losartan (COZAAR) 100 MG tablet TAKE 1 TABLET (100 MG TOTAL) BY MOUTH ONCE DAILY.    magnesium oxide (MAG-OX) 400 mg (241.3 mg magnesium) tablet Take 1 tablet (400 mg total) by mouth 2 (two) times daily.    meclizine (ANTIVERT) 25 mg tablet Take 1 tablet (25 mg total) by mouth 3 (three) times daily as needed for Dizziness.    methenamine (HIPREX) 1 gram Tab Take 1 tablet (1 g total) by mouth 2 (two) times daily.    metoprolol succinate (TOPROL-XL) 25 MG 24 hr tablet Take 0.5 tablets (12.5 mg total) by mouth once daily.    oxyCODONE (ROXICODONE) 10 mg Tab immediate release tablet Take 1 tablet (10 mg total) by mouth every 8 (eight) hours as needed for Pain.     pantoprazole (PROTONIX) 40 MG tablet Take 1 tablet (40 mg total) by mouth once daily.    phenazopyridine (PYRIDIUM) 100 MG tablet Take 1 tablet (100 mg total) by mouth 3 (three) times daily as needed for Pain.     Family History       Problem Relation (Age of Onset)    Alzheimer's disease Father    Cancer Mother    No Known Problems Son, Son, Son, Son    Stroke Sister          Tobacco Use    Smoking status: Never Smoker    Smokeless tobacco: Never Used   Substance and Sexual Activity    Alcohol use: No     Alcohol/week: 0.0 standard drinks    Drug use: No    Sexual activity: Not Currently     Review of Systems   Constitutional: Negative for chills, decreased appetite, diaphoresis, fever, malaise/fatigue, night sweats, weight gain and weight loss.   HENT:  Negative for congestion, ear discharge, ear pain, hearing loss, hoarse voice, nosebleeds, odynophagia, sore throat, stridor and tinnitus.    Eyes:  Negative for blurred vision, discharge, double vision, pain, photophobia, redness, vision loss in left eye, vision loss in right eye, visual disturbance and visual halos.   Cardiovascular:  Negative for chest pain, claudication, cyanosis, dyspnea on exertion, irregular heartbeat, leg swelling, near-syncope, orthopnea, palpitations, paroxysmal nocturnal dyspnea and syncope.   Respiratory:  Negative for cough, hemoptysis, shortness of breath, sleep disturbances due to breathing, snoring, sputum production and wheezing.    Endocrine: Negative for cold intolerance, heat intolerance, polydipsia, polyphagia and polyuria.   Hematologic/Lymphatic: Negative for adenopathy and bleeding problem. Does not bruise/bleed easily.   Skin:  Negative for color change, dry skin, flushing, itching, nail changes, poor wound healing, rash, skin cancer, suspicious lesions and unusual hair distribution.   Musculoskeletal:  Negative for arthritis, back pain, falls, gout, joint pain, joint swelling, muscle cramps, muscle weakness, myalgias, neck  pain and stiffness.   Gastrointestinal:  Positive for abdominal pain, diarrhea, nausea and vomiting. Negative for bloating, anorexia, change in bowel habit, bowel incontinence, constipation, dysphagia, excessive appetite, flatus, heartburn, hematemesis, hematochezia, hemorrhoids, jaundice and melena.   Genitourinary:  Negative for bladder incontinence, decreased libido, dysuria, flank pain, frequency, genital sores, hematuria, hesitancy, incomplete emptying, nocturia and urgency.   Neurological:  Positive for weakness. Negative for aphonia, brief paralysis, difficulty with concentration, disturbances in coordination, excessive daytime sleepiness, dizziness, focal weakness, headaches, light-headedness, loss of balance, numbness, paresthesias, seizures, sensory change, tremors and vertigo.   Psychiatric/Behavioral:  Positive for altered mental status. Negative for depression, hallucinations, memory loss, substance abuse, suicidal ideas and thoughts of violence. The patient does not have insomnia and is not nervous/anxious.    Allergic/Immunologic: Negative for hives and persistent infections.   Objective:     Vital Signs (Most Recent):  Temp: 97.8 °F (36.6 °C) (07/03/22 0904)  Pulse: 102 (07/03/22 1000)  Resp: 20 (07/03/22 1000)  BP: (!) 148/65 (07/03/22 0904)  SpO2: 98 % (07/03/22 1000)   Vital Signs (24h Range):  Temp:  [97.6 °F (36.4 °C)-99 °F (37.2 °C)] 97.8 °F (36.6 °C)  Pulse:  [] 102  Resp:  [16-22] 20  SpO2:  [96 %-100 %] 98 %  BP: (127-162)/(60-94) 148/65     Weight: 72.6 kg (160 lb 0.9 oz)  Body mass index is 25.83 kg/m².    SpO2: 98 %  O2 Device (Oxygen Therapy): room air      Intake/Output Summary (Last 24 hours) at 7/3/2022 1141  Last data filed at 7/3/2022 0554  Gross per 24 hour   Intake 3225.29 ml   Output --   Net 3225.29 ml       Lines/Drains/Airways       Drain  Duration             Female External Urinary Catheter 07/03/22 0100 <1 day              Peripheral Intravenous Line  Duration                   Peripheral IV - Single Lumen 07/02/22 2008 20 G Right Antecubital <1 day                    Physical Exam  Constitutional:       General: She is not in acute distress.     Appearance: She is well-developed. She is not diaphoretic.   HENT:      Head: Normocephalic.   Eyes:      Pupils: Pupils are equal, round, and reactive to light.   Neck:      Vascular: No JVD.   Cardiovascular:      Rate and Rhythm: Normal rate and regular rhythm.      Heart sounds: Normal heart sounds. No murmur heard.    No friction rub. No gallop.   Abdominal:      General: Bowel sounds are normal. There is no distension.      Palpations: Abdomen is soft.      Tenderness: There is no abdominal tenderness. There is no rebound.   Musculoskeletal:         General: No tenderness.   Skin:     General: Skin is warm and dry.   Neurological:      Mental Status: She is alert and oriented to person, place, and time.      Cranial Nerves: No cranial nerve deficit.      Coordination: Coordination normal.   Psychiatric:         Behavior: Behavior normal.         Thought Content: Thought content normal.         Judgment: Judgment normal.       Significant Labs: ABG: No results for input(s): PH, PCO2, HCO3, POCSATURATED, BE in the last 48 hours., Blood Culture: No results for input(s): LABBLOO in the last 48 hours., BMP:   Recent Labs   Lab 07/02/22 2012 07/03/22  0538   * 128*    142   K 4.8 4.0    110   CO2 19* 20*   BUN 38* 39*   CREATININE 3.9* 3.0*   CALCIUM 9.4 8.1*   MG 2.2 1.9   , CMP   Recent Labs   Lab 07/02/22 2012 07/03/22  0538    142   K 4.8 4.0    110   CO2 19* 20*   * 128*   BUN 38* 39*   CREATININE 3.9* 3.0*   CALCIUM 9.4 8.1*   PROT 7.9 6.0   ALBUMIN 3.2* 2.4*   BILITOT 0.6 0.4   ALKPHOS 185* 120   * 101*   ALT 47* 34   ANIONGAP 22* 12   ESTGFRAFRICA 13* 18*   EGFRNONAA 11* 15*   , CBC   Recent Labs   Lab 07/02/22 2012 07/03/22  0538   WBC 22.03* 15.90*   HGB 11.7* 9.2*   HCT 38.1 29.1*     163   , INR No results for input(s): INR, PROTIME in the last 48 hours., Lipid Panel No results for input(s): CHOL, HDL, LDLCALC, TRIG, CHOLHDL in the last 48 hours., Troponin   Recent Labs   Lab 07/02/22 2012 07/03/22  0252 07/03/22  0538   TROPONINI 0.279* 0.173* 0.164*   , and All pertinent lab results from the last 24 hours have been reviewed.    Significant Imaging:

## 2022-07-03 NOTE — PROGRESS NOTES
Pharmacist Renal Dose Adjustment Note    Patito Domingo is a 71 y.o. female being treated with the medication meropenem    Patient Data:    Vital Signs (Most Recent):  Temp: 99 °F (37.2 °C) (07/03/22 0050)  Pulse: 103 (07/03/22 0050)  Resp: 20 (07/03/22 0008)  BP: (!) 154/84 (07/03/22 0050)  SpO2: 98 % (07/03/22 0050)   Vital Signs (72h Range):  Temp:  [97.6 °F (36.4 °C)-99 °F (37.2 °C)]   Pulse:  []   Resp:  [16-22]   BP: (127-154)/(60-94)   SpO2:  [98 %-100 %]      Recent Labs   Lab 07/02/22 2012   CREATININE 3.9*     Serum creatinine: 3.9 mg/dL (H) 07/02/22 2012  Estimated creatinine clearance: 13.9 mL/min (A)    Medication:meropenem dose: 1G frequency q8h will be changed to medication:meropenem dose:1G frequency:q12h    Pharmacist's Name: Joselyn Pleitez  Pharmacist's Extension: [phone]

## 2022-07-03 NOTE — ASSESSMENT & PLAN NOTE
This patient does have evidence of infective focus  My overall impression is sepsis. Vital signs were reviewed and noted in progress note.  Antibiotics given-   Antibiotics (From admission, onward)            Start     Stop Route Frequency Ordered    07/02/22 2200  meropenem 1 g in sodium chloride 0.9 % 100 mL IVPB (ready to mix system)         07/03 0959 IV ED 1 Time 07/02/22 2149        Cultures were taken-   Microbiology Results (last 7 days)     Procedure Component Value Units Date/Time    Blood culture #2 **CANNOT BE ORDERED STAT** [[id number]] Collected: 07/02/22 2213    Order Status: Sent Specimen: Blood Updated: 07/02/22 2213    Blood culture #1 **CANNOT BE ORDERED STAT** [[id number]] Collected: 07/02/22 2213    Order Status: Sent Specimen: Blood Updated: 07/02/22 2213    Urine culture [[id number]] Collected: 07/02/22 1948    Order Status: No result Specimen: Urine Updated: 07/02/22 2020        Latest lactate reviewed, they are-  Recent Labs   Lab 07/02/22 2012   LACTATE 5.2*       Organ dysfunction indicated by Acute kidney injury and Encephalopathy   Source- Urine    Source control Achieved by- Antibiotics for UTI coverage.     Continue meropenem given history of ESBL  Given 2L bolus in ER; will continue with NS at 100 ml/hr given CHF history  Repeat LA and troponin

## 2022-07-03 NOTE — PLAN OF CARE
From ED at 0045 very somnolent but oriented when awake,states generalized pain from fall athome,but pain mostly in arms and legs,no c/o cp or sob,poc reviewed stated understanding bed alarm set.

## 2022-07-03 NOTE — ASSESSMENT & PLAN NOTE
Likely demand ischemia 2/2 sepsis  Trop trending down. No EKG changes or c/o chest pain  TTE  Cardio consulted

## 2022-07-03 NOTE — ASSESSMENT & PLAN NOTE
Consistent with NSTEMI in setting of acidosis and acute renal failure.  Type 2 event suspected.  Echocardiogram to be performed.

## 2022-07-03 NOTE — H&P
[facility] Medicine  History & Physical    Patient Name: Patito Domingo  MRN: [id number]  Patient Class: IP- Inpatient  Admission Date: 7/2/2022  Attending Physician: Matt Duffy MD   Primary Care Provider: Mariela Wei DO         Patient information was obtained from patient and ER records.     Subjective:     Principal Problem:Sepsis    Chief Complaint:   Chief Complaint   Patient presents with    Weakness     Pt presents to ED today via [facility] from home, pt c/o generalized weakness, pain all over body, dizziness upon standing last night, and reports fall.         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 She has also noticed abd pain, n/v, increased urinary frequency/urgency and fell at home last night but denies LOC. Pt denies chest pain, sob, palpitations, diarrhea, constipation. Was prescribed keflex yesterday for UTI. Pt noted to be very somnolent and minimally conversant upon arrival to ED. Workup significant for +UA, leukocytosis, lactic acidosis, FLORECITA, elevated troponin. CT abd/pelvis with no acute findings; unchanged size of carcinoid tumor with liver mets and lymphadenopathy. Started on meropenem for UTI due to recent urine culture growing ESBL klebsiella. Mental status has since improved and patient now alert and oriented x4. Patient will be admitted to hospital medicine service for further management.       Past Medical History:   Diagnosis Date    Allergy     Arthritis     Cataract     Chronic diastolic (congestive) heart failure     Colon cancer     Encounter for blood transfusion     History of ESBL E. 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  Service: Endoscopy;  Laterality: N/A;    ERCP N/A 3/4/2022    Procedure: ERCP (ENDOSCOPIC RETROGRADE CHOLANGIOPANCREATOGRAPHY);  Surgeon: Roby Virgen MD;  Location[facility]  Service: Endoscopy;  Laterality: N/A;    EYE SURGERY      HYSTERECTOMY  1996    LITHOTRIPSY      LIVER BIOPSY      carcinoid    URETEROSCOPY Right 10/10/2019    Procedure: URETEROSCOPY;  Surgeon: Gen Isbell MD;  Location: [facility] OR;  Service: Urology;  Laterality: Right;    UTERINE FIBROID SURGERY         Review of patient's allergies indicates:   Allergen Reactions    Contrast media Hives, Itching and Swelling    Epinephrine Anaphylaxis     Can cause  a Carcinoid Crisis    Ibuprofen Hives, Itching and Swelling    Zofran [ondansetron hcl] Itching     And multiple other reactions    Iodinated contrast media     Morphine Other (See Comments)    Sulfa (sulfonamide antibiotics) Hives, Itching and Swelling       No current facility-administered medications on file prior to encounter.     Current Outpatient Medications on File Prior to Encounter   Medication Sig    ascorbic acid, vitamin C, (VITAMIN C) 1000 MG tablet Take 1 tablet (1,000 mg total) by mouth 2 (two) times daily.    atorvastatin (LIPITOR) 40 MG tablet Take 1 tablet (40 mg total) by mouth every evening.    baclofen (LIORESAL) 10  MG tablet Take 1 tablet (10 mg total) by mouth 2 (two) times daily as needed (muscle cramps).    cephALEXin (KEFLEX) 500 MG capsule Take 1 capsule (500 mg total) by mouth every 8 (eight) hours. for 7 days    cyanocobalamin (VITAMIN B-12) 1000 MCG tablet Take 1 tablet (1,000 mcg total) by mouth once daily.    diclofenac sodium (VOLTAREN) 1 % Gel Apply 2 g topically 4 (four) times daily as needed (area of pain).    dicyclomine (BENTYL) 20 mg tablet TAKE 1 TABLET(20 MG) BY MOUTH THREE TIMES DAILY AS NEEDED FOR ABDOMINAL PAIN    diphenoxylate-atropine 2.5-0.025 mg (LOMOTIL) 2.5-0.025 mg per tablet Take 1 tablet by mouth 4 (four) times daily as needed.    gabapentin (NEURONTIN) 300 MG capsule Take 1 capsule (300 mg total) by mouth every evening.    LIDOcaine (LIDODERM) 5 % Place 1 patch onto the skin once daily. Remove & Discard patch within 12 hours or as directed by MD    losartan (COZAAR) 100 MG tablet TAKE 1 TABLET (100 MG TOTAL) BY MOUTH ONCE DAILY.    magnesium oxide (MAG-OX) 400 mg (241.3 mg magnesium) tablet Take 1 tablet (400 mg total) by mouth 2 (two) times daily.    meclizine (ANTIVERT) 25 mg tablet Take 1 tablet (25 mg total) by mouth 3 (three) times daily as needed for Dizziness.    methenamine (HIPREX) 1 gram Tab Take 1 tablet (1 g total) by mouth 2 (two) times daily.    metoprolol succinate (TOPROL-XL) 25 MG 24 hr tablet Take 0.5 tablets (12.5 mg total) by mouth once daily.    oxyCODONE (ROXICODONE) 10 mg Tab immediate release tablet Take 1 tablet (10 mg total) by mouth every 8 (eight) hours as needed for Pain.    pantoprazole (PROTONIX) 40 MG tablet Take 1 tablet (40 mg total) by mouth once daily.    phenazopyridine (PYRIDIUM) 100 MG tablet Take 1 tablet (100 mg total) by mouth 3 (three) times daily as needed for Pain.     Family History       Problem Relation (Age of Onset)    Alzheimer's disease Father    Cancer Mother    No Known Problems Son, Son, Son, Son    Stroke Sister           Tobacco Use    Smoking status: Never Smoker    Smokeless tobacco: Never Used   Substance and Sexual Activity    Alcohol use: No     Alcohol/week: 0.0 standard drinks    Drug use: No    Sexual activity: Not Currently     Review of Systems   Constitutional:  Positive for activity change, chills, fatigue and fever. Negative for appetite change and diaphoresis.   HENT:  Negative for congestion, postnasal drip, rhinorrhea, sinus pressure, sinus pain and sneezing.    Respiratory:  Negative for cough, chest tightness, shortness of breath, wheezing and stridor.    Cardiovascular:  Negative for chest pain, palpitations and leg swelling.   Gastrointestinal:  Positive for abdominal pain, nausea and vomiting. Negative for abdominal distention, blood in stool, constipation and diarrhea.   Endocrine: Negative for cold intolerance and heat intolerance.   Genitourinary:  Positive for dysuria, frequency and urgency. Negative for flank pain and hematuria.   Musculoskeletal:  Negative for gait problem.   Neurological:  Positive for weakness. Negative for dizziness.   Psychiatric/Behavioral:  Negative for agitation and behavioral problems.    Objective:     Vital Signs (Most Recent):  Temp: 97.8 °F (36.6 °C) (07/03/22 0904)  Pulse: 102 (07/03/22 1000)  Resp: 20 (07/03/22 1000)  BP: (!) 148/65 (07/03/22 0904)  SpO2: 98 % (07/03/22 1000)   Vital Signs (24h Range):  Temp:  [97.6 °F (36.4 °C)-99 °F (37.2 °C)] 97.8 °F (36.6 °C)  Pulse:  [] 102  Resp:  [16-22] 20  SpO2:  [96 %-100 %] 98 %  BP: (127-162)/(60-94) 148/65     Weight: 72.6 kg (160 lb 0.9 oz)  Body mass index is 25.83 kg/m².    Physical Exam  Constitutional:       General: She is not in acute distress.     Appearance: She is well-developed. She is ill-appearing and toxic-appearing.   HENT:      Head: Normocephalic and atraumatic.   Eyes:      Extraocular Movements: EOM normal.      Pupils: Pupils are equal, round, and reactive to light.   Neck:      Vascular: No  JVD.   Cardiovascular:      Rate and Rhythm: Regular rhythm. Tachycardia present.      Heart sounds: Normal heart sounds. No murmur heard.    No friction rub. No gallop.   Pulmonary:      Effort: Pulmonary effort is normal. No respiratory distress.      Breath sounds: Normal breath sounds. No stridor. No rales.   Abdominal:      General: Bowel sounds are normal. There is no distension.      Palpations: Abdomen is soft.      Tenderness: There is abdominal tenderness (diffuse). There is no guarding.   Musculoskeletal:         General: No tenderness. Normal range of motion.      Cervical back: Normal range of motion and neck supple.      Right lower leg: No edema.      Left lower leg: No edema.   Skin:     General: Skin is warm and dry.   Neurological:      General: No focal deficit present.      Mental Status: She is alert and oriented to person, place, and time.      Cranial Nerves: No cranial nerve deficit.      Comments: lethargic   Psychiatric:         Behavior: Behavior normal.         CRANIAL NERVES     CN III, IV, VI   Pupils are equal, round, and reactive to light.  Extraocular motions are normal.      Significant Labs: All pertinent labs within the past 24 hours have been reviewed.    Significant Imaging: I have reviewed all pertinent imaging results/findings within the past 24 hours.    Assessment/Plan:     * Sepsis  This patient does have evidence of infective focus  My overall impression is sepsis. Vital signs were reviewed and noted in progress note.  Antibiotics given-   Antibiotics (From admission, onward)            Start     Stop Route Frequency Ordered    07/02/22 2200  meropenem 1 g in sodium chloride 0.9 % 100 mL IVPB (ready to mix system)         07/03 0959 IV ED 1 Time 07/02/22 2149        Cultures were taken-   Microbiology Results (last 7 days)     Procedure Component Value Units Date/Time    Blood culture #2 **CANNOT BE ORDERED STAT** [[id number]] Collected: 07/02/22 2213    Order Status: Sent  Specimen: Blood Updated: 07/02/22 2213    Blood culture #1 **CANNOT BE ORDERED STAT** [[id number]] Collected: 07/02/22 2213    Order Status: Sent Specimen: Blood Updated: 07/02/22 2213    Urine culture [[id number]] Collected: 07/02/22 1948    Order Status: No result Specimen: Urine Updated: 07/02/22 2020        Latest lactate reviewed, they are-  Recent Labs   Lab 07/02/22 2012   LACTATE 5.2*       Organ dysfunction indicated by Acute kidney injury and Encephalopathy   Source- Urine    Source control Achieved by- Antibiotics for UTI coverage.     Continue meropenem given history of ESBL  Given 2L bolus in ER; will continue with NS at 100 ml/hr given CHF history  Repeat LA and troponin           Lactic acidosis  Due to sepsis  Improving      Elevated troponin  Likely demand ischemia 2/2 sepsis  Trop trending down. No EKG changes or c/o chest pain  TTE  Cardio consulted      Chronic diastolic (congestive) heart failure  No signs of decompensation  Cont metoprolol  TTE pending due to elevated troponin  Monitor I&O         FLORECITA (acute kidney injury)  Lab Results   Component Value Date    CREATININE 3.9 (H) 07/02/2022     Sr Cr elevated secondary to sepsis  Cont to monitor with daily labs   Avoid nephrotoxins.   Renally dose all medications   Monitor events that may lead to decreased renal perfusion (hypovolemia, hypotension, sepsis).   Monitor urine output (goal 0.5 mL/kg/hr) to assure that no obstruction precipitates worsening in GFR.  Serum bicarb level 19. Anticipate improvement with sepsis management.         HLD (hyperlipidemia)  Continue atorvastatin     Essential hypertension  Hold losartan due to FLORECITA  Continue metoprolol       Acute cystitis with hematuria  Urine culture from 5/15/22 growing ESBL Klebsiella  Start meropenem  Follow cultures      Metastatic carcinoid tumor  ER physician discussed the case with Dr. Perez with neuroendocrine services  Continue follow up outpatient as scheduled       VTE Risk  Mitigation (From admission, onward)         Ordered     heparin (porcine) injection 5,000 Units  Every 8 hours         07/03/22 0058     IP VTE HIGH RISK PATIENT  Once         07/03/22 0058     Place sequential compression device  Until discontinued         07/03/22 0058                   Matt Duffy MD  Department of Hospital Medicine   [facility]

## 2022-07-03 NOTE — ASSESSMENT & PLAN NOTE
Lab Results   Component Value Date    CREATININE 3.9 (H) 07/02/2022     Sr Cr elevated secondary to sepsis  Cont to monitor with daily labs   Avoid nephrotoxins.   Renally dose all medications   Monitor events that may lead to decreased renal perfusion (hypovolemia, hypotension, sepsis).   Monitor urine output (goal 0.5 mL/kg/hr) to assure that no obstruction precipitates worsening in GFR.  Serum bicarb level 19. Anticipate improvement with sepsis management.

## 2022-07-03 NOTE — CONSULTS
[place] - [facility]  Cardiology  Consult Note    Patient Name: Patito Domingo  MRN: [id number]  Admission Date: 2022  Hospital Length of Stay: 1 days  Code Status: Full Code   Attending Provider: Matt Duffy MD   Consulting Provider: Bert Bassett MD  Primary Care Physician: Mariela Wei DO  Principal Problem:Sepsis    Patient information was obtained from patient, past medical records, ER records and primary team.     Consults  Subjective:     Chief Complaint:  Elevated troponin history of hypertension    HPI:   7/3/2022 She has history of carcinoid active disease with chemotherapy.  She presented with vomiting and abdominal pain to the ED last night.  She had elevated lactate level at 5.2, CO2 was 19. Her serum creatinine was 3.9 which is new.  She has normal renal function on previous labs.  We are consulted for troponin 0.279.  She has no cardiac history.    She does have a history of hypertension.  She was on 3 drugs, to medications were withdrawn recently as an outpatient and she was kept on low-dose metoprolol.  During the same visit that blood pressure was addressed on 2022, the note reflects weakness, nausea.  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  Her admitting electrocardiogram showed sinus tachycardia, LVH like changes with nonspecific ST changes.      Past Medical History:   Diagnosis Date    Allergy     Arthritis     Cataract     Chronic diastolic (congestive) heart failure     Colon cancer     Encounter for blood transfusion     History of ESBL E. 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)        Past Surgical History:   Procedure Laterality Date    ABDOMINAL SURGERY      CATARACT EXTRACTION Left 10/2017      SECTION      CHOLECYSTECTOMY      COLON SURGERY      cystoscope      CYSTOSCOPY W/ RETROGRADES Right 10/10/2019    Procedure: CYSTOSCOPY, WITH RETROGRADE PYELOGRAM;  Surgeon: Gen Isbell MD;  Location: [facility] OR;  Service: Urology;  Laterality: Right;    ERCP N/A 11/24/2021    Procedure: ERCP (ENDOSCOPIC RETROGRADE CHOLANGIOPANCREATOGRAPHY);  Surgeon: Jayjay Rivera MD;  Location: [facility] ENDO (2ND FLR);  Service: Endoscopy;  Laterality: N/A;    ERCP N/A 3/4/2022    Procedure: ERCP (ENDOSCOPIC RETROGRADE CHOLANGIOPANCREATOGRAPHY);  Surgeon: Roby Virgen MD;  Location: [facility] ENDO (2ND FLR);  Service: Endoscopy;  Laterality: N/A;    EYE SURGERY      HYSTERECTOMY  5/1996    LITHOTRIPSY      LIVER BIOPSY  9/14    carcinoid    URETEROSCOPY Right 10/10/2019    Procedure: URETEROSCOPY;  Surgeon: Gen Isbell MD;  Location: [facility] OR;  Service: Urology;  Laterality: Right;    UTERINE FIBROID SURGERY         Review of patient's allergies indicates:   Allergen Reactions    Contrast media Hives, Itching and Swelling    Epinephrine Anaphylaxis     Can cause  a Carcinoid Crisis    Ibuprofen Hives, Itching and Swelling    Zofran [ondansetron hcl] Itching     And multiple other reactions    Iodinated contrast media     Morphine Other (See Comments)    Sulfa (sulfonamide antibiotics) Hives, Itching and Swelling       No current facility-administered medications on file prior to encounter.     Current Outpatient Medications on File Prior to Encounter   Medication Sig    ascorbic acid, vitamin C, (VITAMIN C) 1000 MG tablet Take 1 tablet (1,000 mg total) by mouth 2 (two) times daily.    atorvastatin (LIPITOR) 40 MG tablet Take 1 tablet (40 mg total) by mouth every evening.    baclofen (LIORESAL) 10 MG tablet Take 1 tablet (10 mg total) by mouth 2 (two) times daily as needed (muscle cramps).    cephALEXin (KEFLEX) 500 MG capsule Take 1 capsule (500 mg total) by mouth every 8 (eight) hours. for 7 days     cyanocobalamin (VITAMIN B-12) 1000 MCG tablet Take 1 tablet (1,000 mcg total) by mouth once daily.    diclofenac sodium (VOLTAREN) 1 % Gel Apply 2 g topically 4 (four) times daily as needed (area of pain).    dicyclomine (BENTYL) 20 mg tablet TAKE 1 TABLET(20 MG) BY MOUTH THREE TIMES DAILY AS NEEDED FOR ABDOMINAL PAIN    diphenoxylate-atropine 2.5-0.025 mg (LOMOTIL) 2.5-0.025 mg per tablet Take 1 tablet by mouth 4 (four) times daily as needed.    gabapentin (NEURONTIN) 300 MG capsule Take 1 capsule (300 mg total) by mouth every evening.    LIDOcaine (LIDODERM) 5 % Place 1 patch onto the skin once daily. Remove & Discard patch within 12 hours or as directed by MD    losartan (COZAAR) 100 MG tablet TAKE 1 TABLET (100 MG TOTAL) BY MOUTH ONCE DAILY.    magnesium oxide (MAG-OX) 400 mg (241.3 mg magnesium) tablet Take 1 tablet (400 mg total) by mouth 2 (two) times daily.    meclizine (ANTIVERT) 25 mg tablet Take 1 tablet (25 mg total) by mouth 3 (three) times daily as needed for Dizziness.    methenamine (HIPREX) 1 gram Tab Take 1 tablet (1 g total) by mouth 2 (two) times daily.    metoprolol succinate (TOPROL-XL) 25 MG 24 hr tablet Take 0.5 tablets (12.5 mg total) by mouth once daily.    oxyCODONE (ROXICODONE) 10 mg Tab immediate release tablet Take 1 tablet (10 mg total) by mouth every 8 (eight) hours as needed for Pain.    pantoprazole (PROTONIX) 40 MG tablet Take 1 tablet (40 mg total) by mouth once daily.    phenazopyridine (PYRIDIUM) 100 MG tablet Take 1 tablet (100 mg total) by mouth 3 (three) times daily as needed for Pain.     Family History       Problem Relation (Age of Onset)    Alzheimer's disease Father    Cancer Mother    No Known Problems Son, Son, Son, Son    Stroke Sister          Tobacco Use    Smoking status: Never Smoker    Smokeless tobacco: Never Used   Substance and Sexual Activity    Alcohol use: No     Alcohol/week: 0.0 standard drinks    Drug use: No    Sexual activity: Not  Currently     Review of Systems   Constitutional: Negative for chills, decreased appetite, diaphoresis, fever, malaise/fatigue, night sweats, weight gain and weight loss.   HENT:  Negative for congestion, ear discharge, ear pain, hearing loss, hoarse voice, nosebleeds, odynophagia, sore throat, stridor and tinnitus.    Eyes:  Negative for blurred vision, discharge, double vision, pain, photophobia, redness, vision loss in left eye, vision loss in right eye, visual disturbance and visual halos.   Cardiovascular:  Negative for chest pain, claudication, cyanosis, dyspnea on exertion, irregular heartbeat, leg swelling, near-syncope, orthopnea, palpitations, paroxysmal nocturnal dyspnea and syncope.   Respiratory:  Negative for cough, hemoptysis, shortness of breath, sleep disturbances due to breathing, snoring, sputum production and wheezing.    Endocrine: Negative for cold intolerance, heat intolerance, polydipsia, polyphagia and polyuria.   Hematologic/Lymphatic: Negative for adenopathy and bleeding problem. Does not bruise/bleed easily.   Skin:  Negative for color change, dry skin, flushing, itching, nail changes, poor wound healing, rash, skin cancer, suspicious lesions and unusual hair distribution.   Musculoskeletal:  Negative for arthritis, back pain, falls, gout, joint pain, joint swelling, muscle cramps, muscle weakness, myalgias, neck pain and stiffness.   Gastrointestinal:  Positive for abdominal pain, diarrhea, nausea and vomiting. Negative for bloating, anorexia, change in bowel habit, bowel incontinence, constipation, dysphagia, excessive appetite, flatus, heartburn, hematemesis, hematochezia, hemorrhoids, jaundice and melena.   Genitourinary:  Negative for bladder incontinence, decreased libido, dysuria, flank pain, frequency, genital sores, hematuria, hesitancy, incomplete emptying, nocturia and urgency.   Neurological:  Positive for weakness. Negative for aphonia, brief paralysis, difficulty with  concentration, disturbances in coordination, excessive daytime sleepiness, dizziness, focal weakness, headaches, light-headedness, loss of balance, numbness, paresthesias, seizures, sensory change, tremors and vertigo.   Psychiatric/Behavioral:  Positive for altered mental status. Negative for depression, hallucinations, memory loss, substance abuse, suicidal ideas and thoughts of violence. The patient does not have insomnia and is not nervous/anxious.    Allergic/Immunologic: Negative for hives and persistent infections.   Objective:     Vital Signs (Most Recent):  Temp: 97.8 °F (36.6 °C) (07/03/22 0904)  Pulse: 102 (07/03/22 1000)  Resp: 20 (07/03/22 1000)  BP: (!) 148/65 (07/03/22 0904)  SpO2: 98 % (07/03/22 1000)   Vital Signs (24h Range):  Temp:  [97.6 °F (36.4 °C)-99 °F (37.2 °C)] 97.8 °F (36.6 °C)  Pulse:  [] 102  Resp:  [16-22] 20  SpO2:  [96 %-100 %] 98 %  BP: (127-162)/(60-94) 148/65     Weight: 72.6 kg (160 lb 0.9 oz)  Body mass index is 25.83 kg/m².    SpO2: 98 %  O2 Device (Oxygen Therapy): room air      Intake/Output Summary (Last 24 hours) at 7/3/2022 1141  Last data filed at 7/3/2022 0554  Gross per 24 hour   Intake 3225.29 ml   Output --   Net 3225.29 ml       Lines/Drains/Airways       Drain  Duration             Female External Urinary Catheter 07/03/22 0100 <1 day              Peripheral Intravenous Line  Duration                  Peripheral IV - Single Lumen 07/02/22 2008 20 G Right Antecubital <1 day                    Physical Exam  Constitutional:       General: She is not in acute distress.     Appearance: She is well-developed. She is not diaphoretic.   HENT:      Head: Normocephalic.   Eyes:      Pupils: Pupils are equal, round, and reactive to light.   Neck:      Vascular: No JVD.   Cardiovascular:      Rate and Rhythm: Normal rate and regular rhythm.      Heart sounds: Normal heart sounds. No murmur heard.    No friction rub. No gallop.   Abdominal:      General: Bowel sounds are  normal. There is no distension.      Palpations: Abdomen is soft.      Tenderness: There is no abdominal tenderness. There is no rebound.   Musculoskeletal:         General: No tenderness.   Skin:     General: Skin is warm and dry.   Neurological:      Mental Status: She is alert and oriented to person, place, and time.      Cranial Nerves: No cranial nerve deficit.      Coordination: Coordination normal.   Psychiatric:         Behavior: Behavior normal.         Thought Content: Thought content normal.         Judgment: Judgment normal.       Significant Labs: ABG: No results for input(s): PH, PCO2, HCO3, POCSATURATED, BE in the last 48 hours., Blood Culture: No results for input(s): LABBLOO in the last 48 hours., BMP:   Recent Labs   Lab 07/02/22 2012 07/03/22  0538   * 128*    142   K 4.8 4.0    110   CO2 19* 20*   BUN 38* 39*   CREATININE 3.9* 3.0*   CALCIUM 9.4 8.1*   MG 2.2 1.9   , CMP   Recent Labs   Lab 07/02/22 2012 07/03/22  0538    142   K 4.8 4.0    110   CO2 19* 20*   * 128*   BUN 38* 39*   CREATININE 3.9* 3.0*   CALCIUM 9.4 8.1*   PROT 7.9 6.0   ALBUMIN 3.2* 2.4*   BILITOT 0.6 0.4   ALKPHOS 185* 120   * 101*   ALT 47* 34   ANIONGAP 22* 12   ESTGFRAFRICA 13* 18*   EGFRNONAA 11* 15*   , CBC   Recent Labs   Lab 07/02/22 2012 07/03/22  0538   WBC 22.03* 15.90*   HGB 11.7* 9.2*   HCT 38.1 29.1*    163   , INR No results for input(s): INR, PROTIME in the last 48 hours., Lipid Panel No results for input(s): CHOL, HDL, LDLCALC, TRIG, CHOLHDL in the last 48 hours., Troponin   Recent Labs   Lab 07/02/22 2012 07/03/22  0252 07/03/22  0538   TROPONINI 0.279* 0.173* 0.164*   , and All pertinent lab results from the last 24 hours have been reviewed.    Significant Imaging:     Assessment and Plan:     Elevated troponin  Consistent with NSTEMI in setting of acidosis and acute renal failure.  Type 2 event suspected.  Echocardiogram to be performed.    FLORECITA (acute  kidney injury)  Pre renal picture.  This morning serum creatinine is 3.0.    Essential hypertension  Low-dose metoprolol 12.5 mg b.i.d. continued on admission.  Blood pressure is controlled.    Metastatic carcinoid tumor  Active condition with metastatic disease to liver.  She is getting chemotherapy.  She has had nausea vomiting abdominal pain and diarrhea as an outpatient.  Fluid resuscitation on board.        VTE Risk Mitigation (From admission, onward)         Ordered     heparin (porcine) injection 5,000 Units  Every 8 hours         07/03/22 0058     IP VTE HIGH RISK PATIENT  Once         07/03/22 0058     Place sequential compression device  Until discontinued         07/03/22 0058                Thank you for your consult. I will follow-up with patient. Please contact us if you have any additional questions.    Bert Bassett MD  Cardiology   [place] - [facility]

## 2022-07-04 PROBLEM — M62.82 NON-TRAUMATIC RHABDOMYOLYSIS: Status: ACTIVE | Noted: 2022-07-04

## 2022-07-04 PROBLEM — R78.81 BACTEREMIA: Status: ACTIVE | Noted: 2022-07-04

## 2022-07-04 LAB
ALBUMIN SERPL BCP-MCNC: 2.3 G/DL (ref 3.5–5.2)
ALP SERPL-CCNC: 108 U/L (ref 55–135)
ALT SERPL W/O P-5'-P-CCNC: 23 U/L (ref 10–44)
ANION GAP SERPL CALC-SCNC: 10 MMOL/L (ref 8–16)
AORTIC ROOT ANNULUS: 3.04 CM
AST SERPL-CCNC: 43 U/L (ref 10–40)
AV INDEX (PROSTH): 0.86
AV MEAN GRADIENT: 7 MMHG
AV PEAK GRADIENT: 11 MMHG
AV VALVE AREA: 3.35 CM2
AV VELOCITY RATIO: 0.96
BASOPHILS # BLD AUTO: 0.03 K/UL (ref 0–0.2)
BASOPHILS NFR BLD: 0.3 % (ref 0–1.9)
BILIRUB SERPL-MCNC: 0.5 MG/DL (ref 0.1–1)
BSA FOR ECHO PROCEDURE: 2 M2
BUN SERPL-MCNC: 46 MG/DL (ref 8–23)
CALCIUM SERPL-MCNC: 8.1 MG/DL (ref 8.7–10.5)
CHLORIDE SERPL-SCNC: 114 MMOL/L (ref 95–110)
CK SERPL-CCNC: 1842 U/L (ref 20–180)
CO2 SERPL-SCNC: 22 MMOL/L (ref 23–29)
CREAT SERPL-MCNC: 2.8 MG/DL (ref 0.5–1.4)
CV ECHO LV RWT: 0.8 CM
DIFFERENTIAL METHOD: ABNORMAL
DOP CALC AO PEAK VEL: 1.68 M/S
DOP CALC AO VTI: 27.97 CM
DOP CALC LVOT AREA: 3.9 CM2
DOP CALC LVOT DIAMETER: 2.23 CM
DOP CALC LVOT PEAK VEL: 1.62 M/S
DOP CALC LVOT STROKE VOLUME: 93.57 CM3
DOP CALC MV VTI: 18.62 CM
DOP CALCLVOT PEAK VEL VTI: 23.97 CM
E WAVE DECELERATION TIME: 205.43 MSEC
E/A RATIO: 0.59
E/E' RATIO: 13 M/S
ECHO LV POSTERIOR WALL: 1.45 CM (ref 0.6–1.1)
EJECTION FRACTION: 70 %
EOSINOPHIL # BLD AUTO: 0 K/UL (ref 0–0.5)
EOSINOPHIL NFR BLD: 0.2 % (ref 0–8)
ERYTHROCYTE [DISTWIDTH] IN BLOOD BY AUTOMATED COUNT: 16.6 % (ref 11.5–14.5)
EST. GFR  (AFRICAN AMERICAN): 19 ML/MIN/1.73 M^2
EST. GFR  (NON AFRICAN AMERICAN): 17 ML/MIN/1.73 M^2
FERRITIN SERPL-MCNC: 85 NG/ML (ref 20–300)
FOLATE SERPL-MCNC: 10.8 NG/ML (ref 4–24)
FRACTIONAL SHORTENING: 27 % (ref 28–44)
GLUCOSE SERPL-MCNC: 115 MG/DL (ref 70–110)
HCT VFR BLD AUTO: 28.4 % (ref 37–48.5)
HGB BLD-MCNC: 8.5 G/DL (ref 12–16)
IMM GRANULOCYTES # BLD AUTO: 0.15 K/UL (ref 0–0.04)
IMM GRANULOCYTES NFR BLD AUTO: 1.4 % (ref 0–0.5)
INTERVENTRICULAR SEPTUM: 1.45 CM (ref 0.6–1.1)
IRON SERPL-MCNC: 11 UG/DL (ref 30–160)
LA MAJOR: 5.44 CM
LA MINOR: 3.62 CM
LA WIDTH: 3.31 CM
LEFT ATRIUM SIZE: 2.67 CM
LEFT ATRIUM VOLUME INDEX MOD: 11.8 ML/M2
LEFT ATRIUM VOLUME INDEX: 16.7 ML/M2
LEFT ATRIUM VOLUME MOD: 23.2 CM3
LEFT ATRIUM VOLUME: 32.66 CM3
LEFT INTERNAL DIMENSION IN SYSTOLE: 2.64 CM (ref 2.1–4)
LEFT VENTRICLE DIASTOLIC VOLUME INDEX: 28.13 ML/M2
LEFT VENTRICLE DIASTOLIC VOLUME: 55.14 ML
LEFT VENTRICLE MASS INDEX: 98 G/M2
LEFT VENTRICLE SYSTOLIC VOLUME INDEX: 13 ML/M2
LEFT VENTRICLE SYSTOLIC VOLUME: 25.56 ML
LEFT VENTRICULAR INTERNAL DIMENSION IN DIASTOLE: 3.62 CM (ref 3.5–6)
LEFT VENTRICULAR MASS: 191.74 G
LV LATERAL E/E' RATIO: 10.83 M/S
LV SEPTAL E/E' RATIO: 16.25 M/S
LYMPHOCYTES # BLD AUTO: 0.7 K/UL (ref 1–4.8)
LYMPHOCYTES NFR BLD: 6.5 % (ref 18–48)
MAGNESIUM SERPL-MCNC: 2.1 MG/DL (ref 1.6–2.6)
MCH RBC QN AUTO: 25.6 PG (ref 27–31)
MCHC RBC AUTO-ENTMCNC: 29.9 G/DL (ref 32–36)
MCV RBC AUTO: 86 FL (ref 82–98)
MONOCYTES # BLD AUTO: 0.7 K/UL (ref 0.3–1)
MONOCYTES NFR BLD: 6.8 % (ref 4–15)
MV MEAN GRADIENT: 1 MMHG
MV PEAK A VEL: 1.1 M/S
MV PEAK E VEL: 0.65 M/S
MV PEAK GRADIENT: 5 MMHG
MV STENOSIS PRESSURE HALF TIME: 59.57 MS
MV VALVE AREA BY CONTINUITY EQUATION: 5.03 CM2
MV VALVE AREA P 1/2 METHOD: 3.69 CM2
NEUTROPHILS # BLD AUTO: 9.2 K/UL (ref 1.8–7.7)
NEUTROPHILS NFR BLD: 84.8 % (ref 38–73)
NRBC BLD-RTO: 0 /100 WBC
PHOSPHATE SERPL-MCNC: 4.3 MG/DL (ref 2.7–4.5)
PISA TR MAX VEL: 2.98 M/S
PLATELET # BLD AUTO: 159 K/UL (ref 150–450)
PMV BLD AUTO: 11 FL (ref 9.2–12.9)
POCT GLUCOSE: 121 MG/DL (ref 70–110)
POCT GLUCOSE: 165 MG/DL (ref 70–110)
POCT GLUCOSE: 167 MG/DL (ref 70–110)
POCT GLUCOSE: 180 MG/DL (ref 70–110)
POTASSIUM SERPL-SCNC: 4 MMOL/L (ref 3.5–5.1)
PROT SERPL-MCNC: 5.4 G/DL (ref 6–8.4)
PV PEAK VELOCITY: 1.12 CM/S
RA PRESSURE: 8 MMHG
RBC # BLD AUTO: 3.32 M/UL (ref 4–5.4)
RV TISSUE DOPPLER FREE WALL SYSTOLIC VELOCITY 1 (APICAL 4 CHAMBER VIEW): 19.25 CM/S
SATURATED IRON: 4 % (ref 20–50)
SODIUM SERPL-SCNC: 146 MMOL/L (ref 136–145)
TDI LATERAL: 0.06 M/S
TDI SEPTAL: 0.04 M/S
TDI: 0.05 M/S
TOTAL IRON BINDING CAPACITY: 262 UG/DL (ref 250–450)
TR MAX PG: 36 MMHG
TRANSFERRIN SERPL-MCNC: 177 MG/DL (ref 200–375)
TRICUSPID ANNULAR PLANE SYSTOLIC EXCURSION: 1.24 CM
TV REST PULMONARY ARTERY PRESSURE: 44 MMHG
VIT B12 SERPL-MCNC: 722 PG/ML (ref 210–950)
WBC # BLD AUTO: 10.88 K/UL (ref 3.9–12.7)

## 2022-07-04 PROCEDURE — 36415 COLL VENOUS BLD VENIPUNCTURE: CPT | Performed by: INTERNAL MEDICINE

## 2022-07-04 PROCEDURE — 63600175 PHARM REV CODE 636 W HCPCS: Performed by: HOSPITALIST

## 2022-07-04 PROCEDURE — 25000003 PHARM REV CODE 250: Performed by: INTERNAL MEDICINE

## 2022-07-04 PROCEDURE — 82728 ASSAY OF FERRITIN: CPT | Performed by: INTERNAL MEDICINE

## 2022-07-04 PROCEDURE — 99232 PR SUBSEQUENT HOSPITAL CARE,LEVL II: ICD-10-PCS | Mod: 25,,, | Performed by: INTERNAL MEDICINE

## 2022-07-04 PROCEDURE — 82550 ASSAY OF CK (CPK): CPT | Performed by: INTERNAL MEDICINE

## 2022-07-04 PROCEDURE — 94761 N-INVAS EAR/PLS OXIMETRY MLT: CPT

## 2022-07-04 PROCEDURE — 83735 ASSAY OF MAGNESIUM: CPT | Performed by: INTERNAL MEDICINE

## 2022-07-04 PROCEDURE — 99900035 HC TECH TIME PER 15 MIN (STAT)

## 2022-07-04 PROCEDURE — 11000001 HC ACUTE MED/SURG PRIVATE ROOM

## 2022-07-04 PROCEDURE — 25000003 PHARM REV CODE 250: Performed by: HOSPITALIST

## 2022-07-04 PROCEDURE — 84466 ASSAY OF TRANSFERRIN: CPT | Performed by: INTERNAL MEDICINE

## 2022-07-04 PROCEDURE — 80053 COMPREHEN METABOLIC PANEL: CPT | Performed by: INTERNAL MEDICINE

## 2022-07-04 PROCEDURE — 85025 COMPLETE CBC W/AUTO DIFF WBC: CPT | Performed by: INTERNAL MEDICINE

## 2022-07-04 PROCEDURE — 87040 BLOOD CULTURE FOR BACTERIA: CPT | Performed by: INTERNAL MEDICINE

## 2022-07-04 PROCEDURE — 82607 VITAMIN B-12: CPT | Performed by: INTERNAL MEDICINE

## 2022-07-04 PROCEDURE — 82746 ASSAY OF FOLIC ACID SERUM: CPT | Performed by: INTERNAL MEDICINE

## 2022-07-04 PROCEDURE — 84100 ASSAY OF PHOSPHORUS: CPT | Performed by: INTERNAL MEDICINE

## 2022-07-04 PROCEDURE — 99232 SBSQ HOSP IP/OBS MODERATE 35: CPT | Mod: 25,,, | Performed by: INTERNAL MEDICINE

## 2022-07-04 RX ORDER — OXYCODONE AND ACETAMINOPHEN 10; 325 MG/1; MG/1
1 TABLET ORAL EVERY 6 HOURS PRN
Status: DISCONTINUED | OUTPATIENT
Start: 2022-07-04 | End: 2022-07-05

## 2022-07-04 RX ORDER — OXYCODONE AND ACETAMINOPHEN 5; 325 MG/1; MG/1
1 TABLET ORAL EVERY 6 HOURS PRN
Status: DISCONTINUED | OUTPATIENT
Start: 2022-07-04 | End: 2022-07-05

## 2022-07-04 RX ADMIN — CYANOCOBALAMIN TAB 1000 MCG 1000 MCG: 1000 TAB at 11:07

## 2022-07-04 RX ADMIN — HEPARIN SODIUM 5000 UNITS: 5000 INJECTION INTRAVENOUS; SUBCUTANEOUS at 05:07

## 2022-07-04 RX ADMIN — OXYCODONE 10 MG: 5 TABLET ORAL at 05:07

## 2022-07-04 RX ADMIN — DOCUSATE SODIUM - SENNOSIDES 1 TABLET: 50; 8.6 TABLET, FILM COATED ORAL at 08:07

## 2022-07-04 RX ADMIN — DOCUSATE SODIUM - SENNOSIDES 1 TABLET: 50; 8.6 TABLET, FILM COATED ORAL at 11:07

## 2022-07-04 RX ADMIN — PANTOPRAZOLE SODIUM 40 MG: 40 TABLET, DELAYED RELEASE ORAL at 11:07

## 2022-07-04 RX ADMIN — METOPROLOL SUCCINATE 12.5 MG: 25 TABLET, EXTENDED RELEASE ORAL at 11:07

## 2022-07-04 RX ADMIN — MEROPENEM 1 G: 1 INJECTION, POWDER, FOR SOLUTION INTRAVENOUS at 11:07

## 2022-07-04 RX ADMIN — HEPARIN SODIUM 5000 UNITS: 5000 INJECTION INTRAVENOUS; SUBCUTANEOUS at 09:07

## 2022-07-04 RX ADMIN — OXYCODONE HYDROCHLORIDE AND ACETAMINOPHEN 1 TABLET: 10; 325 TABLET ORAL at 12:07

## 2022-07-04 NOTE — ASSESSMENT & PLAN NOTE
Blood culture 1 tube growing GNR  Possible seeding from bladder  Cont meropenem  Repeat blood cultures

## 2022-07-04 NOTE — SUBJECTIVE & OBJECTIVE
Interval History: WBC trending down. Mental status back to baseline. Blood cx 1 bottle growing GNR. Urine culture also growing GNR. Pt states she feels much better, although does c/o R sided pain due to falling a few days ago. States she was on the ground for several hours.     Review of Systems   Constitutional:  Positive for activity change and fatigue. Negative for appetite change, chills, diaphoresis and fever.   HENT:  Negative for congestion, postnasal drip, rhinorrhea, sinus pressure, sinus pain and sneezing.    Respiratory:  Negative for cough, chest tightness, shortness of breath, wheezing and stridor.    Cardiovascular:  Negative for chest pain, palpitations and leg swelling.   Gastrointestinal:  Negative for abdominal distention, abdominal pain, blood in stool, constipation, diarrhea, nausea and vomiting.   Endocrine: Negative for cold intolerance and heat intolerance.   Genitourinary:  Positive for dysuria, frequency and urgency. Negative for flank pain and hematuria.   Musculoskeletal:  Negative for gait problem.   Neurological:  Positive for weakness. Negative for dizziness.   Psychiatric/Behavioral:  Negative for agitation and behavioral problems.    Objective:     Vital Signs (Most Recent):  Temp: 98.6 °F (37 °C) (07/04/22 1516)  Pulse: 87 (07/04/22 1516)  Resp: 18 (07/04/22 1516)  BP: (!) 119/58 (07/04/22 1516)  SpO2: 97 % (07/04/22 1514)   Vital Signs (24h Range):  Temp:  [98.6 °F (37 °C)-99.7 °F (37.6 °C)] 98.6 °F (37 °C)  Pulse:  [] 87  Resp:  [16-20] 18  SpO2:  [95 %-99 %] 97 %  BP: (119-152)/(58-69) 119/58     Weight: 86.2 kg (190 lb)  Body mass index is 30.67 kg/m².    Intake/Output Summary (Last 24 hours) at 7/4/2022 1523  Last data filed at 7/4/2022 0517  Gross per 24 hour   Intake 1698.81 ml   Output 300 ml   Net 1398.81 ml      Physical Exam  Constitutional:       General: She is not in acute distress.     Appearance: She is well-developed. She is ill-appearing. She is not  toxic-appearing.   HENT:      Head: Normocephalic and atraumatic.   Eyes:      Pupils: Pupils are equal, round, and reactive to light.   Neck:      Vascular: No JVD.   Cardiovascular:      Rate and Rhythm: Regular rhythm. Tachycardia present.      Heart sounds: Normal heart sounds. No murmur heard.    No friction rub. No gallop.   Pulmonary:      Effort: Pulmonary effort is normal. No respiratory distress.      Breath sounds: Normal breath sounds. No stridor. No rales.   Abdominal:      General: Bowel sounds are normal. There is no distension.      Palpations: Abdomen is soft.      Tenderness: There is abdominal tenderness (diffuse). There is no guarding.   Musculoskeletal:         General: No tenderness. Normal range of motion.      Cervical back: Normal range of motion and neck supple.      Right lower leg: No edema.      Left lower leg: No edema.   Skin:     General: Skin is warm and dry.   Neurological:      General: No focal deficit present.      Mental Status: She is alert and oriented to person, place, and time. Mental status is at baseline.      Cranial Nerves: No cranial nerve deficit.   Psychiatric:         Behavior: Behavior normal.       Significant Labs: All pertinent labs within the past 24 hours have been reviewed.    Significant Imaging: I have reviewed all pertinent imaging results/findings within the past 24 hours.

## 2022-07-04 NOTE — CONSULTS
[place] - [facility]  Cardiology  Consult Note    Patient Name: Patito Domingo  MRN: [id number]  Admission Date: 7/2/2022  Hospital Length of Stay: 2 days  Code Status: Full Code   Attending Provider: Matt Duffy MD   Consulting Provider: FELIZ Whitlock, ANP  Primary Care Physician: Mariela Wei DO  Principal Problem:Sepsis        Inpatient consult to Cardiology-[facility]  Consult performed by: FELIZ Bland, ANP  Consult ordered by: Matt Duffy MD        See full consult note dated 7/3/3022 by Dr. Bert Bassett

## 2022-07-04 NOTE — ASSESSMENT & PLAN NOTE
This patient does have evidence of infective focus  My overall impression is sepsis. Vital signs were reviewed and noted in progress note.  Antibiotics given-   Antibiotics (From admission, onward)            Start     Stop Route Frequency Ordered    07/03/22 1130  meropenem 1 g in sodium chloride 0.9 % 100 mL IVPB (ready to mix system)         -- IV Every 12 hours (non-standard times) 07/03/22 0101        Cultures were taken-   Microbiology Results (last 7 days)     Procedure Component Value Units Date/Time    Blood culture [[id number]] Collected: 07/04/22 1008    Order Status: Sent Specimen: Blood Updated: 07/04/22 1347    Blood culture [[id number]] Collected: 07/04/22 1008    Order Status: Sent Specimen: Blood Updated: 07/04/22 1347    Blood culture #1 **CANNOT BE ORDERED STAT** [[id number]] Collected: 07/02/22 2213    Order Status: Completed Specimen: Blood Updated: 07/04/22 1212     Blood Culture, Routine No Growth to date      No Growth to date    Urine culture [[id number]]  (Abnormal) Collected: 07/02/22 1948    Order Status: Completed Specimen: Urine Updated: 07/04/22 0944     Urine Culture, Routine GRAM NEGATIVE JADEN  >100,000 cfu/ml  Identification and susceptibility pending      Narrative:      Specimen Source->Urine    Blood culture #2 **CANNOT BE ORDERED STAT** [[id number]] Collected: 07/02/22 2213    Order Status: Completed Specimen: Blood Updated: 07/04/22 0129     Blood Culture, Routine Gram stain aer bottle: Gram negative rods       Results called to and read back by: Renee Lancaster RN  07/04/2022  01:29        Latest lactate reviewed, they are-  Recent Labs   Lab 07/02/22 2012 07/03/22  0252 07/03/22  0538   LACTATE 5.2* 2.1 1.2       Organ dysfunction indicated by Acute kidney injury and Encephalopathy   Source- Urine    Source control Achieved by- Antibiotics for UTI coverage.     Continue meropenem given history of ESBL  Given 2L bolus in ER; will continue with NS at 100 ml/hr given CHF history  Repeat  LA and troponin   Resolved

## 2022-07-04 NOTE — ASSESSMENT & PLAN NOTE
No signs of decompensation  Cont metoprolol  TTE showing grade I diastolic dysfunction  Monitor I&O

## 2022-07-04 NOTE — ASSESSMENT & PLAN NOTE
Urine culture from 5/15/22 growing ESBL Klebsiella  Meropenem  Urine culture growing GNR, cont to follow

## 2022-07-04 NOTE — ASSESSMENT & PLAN NOTE
Lab Results   Component Value Date    CREATININE 2.8 (H) 07/04/2022     Sr Cr elevated secondary to sepsis  Cont to monitor with daily labs   Avoid nephrotoxins.   Renally dose all medications   Monitor events that may lead to decreased renal perfusion (hypovolemia, hypotension, sepsis).   Monitor urine output (goal 0.5 mL/kg/hr) to assure that no obstruction precipitates worsening in GFR.  Serum bicarb level 19. Anticipate improvement with sepsis management.   FLORECITA possibly due to sepsis vs rhabdo  Gradual improvement

## 2022-07-04 NOTE — PROGRESS NOTES
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 She has also noticed abd pain, n/v, increased urinary frequency/urgency and fell at home last night but denies LOC. Pt denies chest pain, sob, palpitations, diarrhea, constipation. Was prescribed keflex yesterday for UTI. Pt noted to be very somnolent and minimally conversant upon arrival to ED. Workup significant for +UA, leukocytosis, lactic acidosis, FLORECITA, elevated troponin. CT abd/pelvis with no acute findings; unchanged size of carcinoid tumor with liver mets and lymphadenopathy. Started on meropenem for UTI due to recent urine culture growing ESBL klebsiella. Mental status has since improved and patient now alert and oriented x4. Patient will be admitted to hospital medicine service for further management.       Overview/Hospital Course:  No notes on file    Interval History: WBC trending down. Mental status back to baseline. Blood cx 1 bottle growing GNR. Urine culture also growing GNR. Pt states she feels much better, although does c/o R sided pain due to falling a few days ago. States she was on the ground for several hours.     Review of Systems   Constitutional:  Positive for activity change and fatigue. Negative for appetite change, chills, diaphoresis and fever.   HENT:  Negative for congestion, postnasal drip, rhinorrhea, sinus pressure, sinus pain and sneezing.    Respiratory:  Negative for cough, chest tightness, shortness of breath, wheezing and stridor.    Cardiovascular:  Negative for chest pain,  palpitations and leg swelling.   Gastrointestinal:  Negative for abdominal distention, abdominal pain, blood in stool, constipation, diarrhea, nausea and vomiting.   Endocrine: Negative for cold intolerance and heat intolerance.   Genitourinary:  Positive for dysuria, frequency and urgency. Negative for flank pain and hematuria.   Musculoskeletal:  Negative for gait problem.   Neurological:  Positive for weakness. Negative for dizziness.   Psychiatric/Behavioral:  Negative for agitation and behavioral problems.    Objective:     Vital Signs (Most Recent):  Temp: 98.6 °F (37 °C) (07/04/22 1516)  Pulse: 87 (07/04/22 1516)  Resp: 18 (07/04/22 1516)  BP: (!) 119/58 (07/04/22 1516)  SpO2: 97 % (07/04/22 1514)   Vital Signs (24h Range):  Temp:  [98.6 °F (37 °C)-99.7 °F (37.6 °C)] 98.6 °F (37 °C)  Pulse:  [] 87  Resp:  [16-20] 18  SpO2:  [95 %-99 %] 97 %  BP: (119-152)/(58-69) 119/58     Weight: 86.2 kg (190 lb)  Body mass index is 30.67 kg/m².    Intake/Output Summary (Last 24 hours) at 7/4/2022 1523  Last data filed at 7/4/2022 0517  Gross per 24 hour   Intake 1698.81 ml   Output 300 ml   Net 1398.81 ml      Physical Exam  Constitutional:       General: She is not in acute distress.     Appearance: She is well-developed. She is ill-appearing. She is not toxic-appearing.   HENT:      Head: Normocephalic and atraumatic.   Eyes:      Pupils: Pupils are equal, round, and reactive to light.   Neck:      Vascular: No JVD.   Cardiovascular:      Rate and Rhythm: Regular rhythm. Tachycardia present.      Heart sounds: Normal heart sounds. No murmur heard.    No friction rub. No gallop.   Pulmonary:      Effort: Pulmonary effort is normal. No respiratory distress.      Breath sounds: Normal breath sounds. No stridor. No rales.   Abdominal:      General: Bowel sounds are normal. There is no distension.      Palpations: Abdomen is soft.      Tenderness: There is abdominal tenderness (diffuse). There is no guarding.    Musculoskeletal:         General: No tenderness. Normal range of motion.      Cervical back: Normal range of motion and neck supple.      Right lower leg: No edema.      Left lower leg: No edema.   Skin:     General: Skin is warm and dry.   Neurological:      General: No focal deficit present.      Mental Status: She is alert and oriented to person, place, and time. Mental status is at baseline.      Cranial Nerves: No cranial nerve deficit.   Psychiatric:         Behavior: Behavior normal.       Significant Labs: All pertinent labs within the past 24 hours have been reviewed.    Significant Imaging: I have reviewed all pertinent imaging results/findings within the past 24 hours.      Assessment/Plan:      * Sepsis  This patient does have evidence of infective focus  My overall impression is sepsis. Vital signs were reviewed and noted in progress note.  Antibiotics given-   Antibiotics (From admission, onward)            Start     Stop Route Frequency Ordered    07/03/22 1130  meropenem 1 g in sodium chloride 0.9 % 100 mL IVPB (ready to mix system)         -- IV Every 12 hours (non-standard times) 07/03/22 0101        Cultures were taken-   Microbiology Results (last 7 days)     Procedure Component Value Units Date/Time    Blood culture [[id number]] Collected: 07/04/22 1008    Order Status: Sent Specimen: Blood Updated: 07/04/22 1347    Blood culture [[id number]] Collected: 07/04/22 1008    Order Status: Sent Specimen: Blood Updated: 07/04/22 1347    Blood culture #1 **CANNOT BE ORDERED STAT** [[id number]] Collected: 07/02/22 2213    Order Status: Completed Specimen: Blood Updated: 07/04/22 1212     Blood Culture, Routine No Growth to date      No Growth to date    Urine culture [[id number]]  (Abnormal) Collected: 07/02/22 1948    Order Status: Completed Specimen: Urine Updated: 07/04/22 0944     Urine Culture, Routine GRAM NEGATIVE JADEN  >100,000 cfu/ml  Identification and susceptibility pending      Narrative:       Specimen Source->Urine    Blood culture #2 **CANNOT BE ORDERED STAT** [[id number]] Collected: 07/02/22 2213    Order Status: Completed Specimen: Blood Updated: 07/04/22 0129     Blood Culture, Routine Gram stain aer bottle: Gram negative rods       Results called to and read back by: Renee Lancaster RN  07/04/2022  01:29        Latest lactate reviewed, they are-  Recent Labs   Lab 07/02/22 2012 07/03/22  0252 07/03/22  0538   LACTATE 5.2* 2.1 1.2       Organ dysfunction indicated by Acute kidney injury and Encephalopathy   Source- Urine    Source control Achieved by- Antibiotics for UTI coverage.     Continue meropenem given history of ESBL  Given 2L bolus in ER; will continue with NS at 100 ml/hr given CHF history  Repeat LA and troponin   Resolved          Bacteremia  Blood culture 1 tube growing GNR  Possible seeding from bladder  Cont meropenem  Repeat blood cultures      Non-traumatic rhabdomyolysis  CK 1842  FLORECITA possibly a complication of rhabdo, although CK not significantly elevated  Received fluid bolus due to sepsis  Hold further fluid due to HF  Trend CK      Lactic acidosis  Due to sepsis  Improving      Elevated troponin  Likely demand ischemia 2/2 sepsis  Trop trending down. No EKG changes or c/o chest pain  TTE noted        Chronic diastolic (congestive) heart failure  No signs of decompensation  Cont metoprolol  TTE showing grade I diastolic dysfunction  Monitor I&O         FLORECITA (acute kidney injury)  Lab Results   Component Value Date    CREATININE 2.8 (H) 07/04/2022     Sr Cr elevated secondary to sepsis  Cont to monitor with daily labs   Avoid nephrotoxins.   Renally dose all medications   Monitor events that may lead to decreased renal perfusion (hypovolemia, hypotension, sepsis).   Monitor urine output (goal 0.5 mL/kg/hr) to assure that no obstruction precipitates worsening in GFR.  Serum bicarb level 19. Anticipate improvement with sepsis management.   FLORECITA possibly due to sepsis vs rhabdo  Gradual  improvement        HLD (hyperlipidemia)  Continue atorvastatin     Essential hypertension  Hold losartan due to FLORECITA  Continue metoprolol       Acute cystitis with hematuria  Urine culture from 5/15/22 growing ESBL Klebsiella  Meropenem  Urine culture growing GNR, cont to follow      Metastatic carcinoid tumor  ER physician discussed the case with Dr. Perez with neuroendocrine services  Continue follow up outpatient as scheduled       VTE Risk Mitigation (From admission, onward)         Ordered     heparin (porcine) injection 5,000 Units  Every 8 hours         07/03/22 0058     IP VTE HIGH RISK PATIENT  Once         07/03/22 0058     Place sequential compression device  Until discontinued         07/03/22 0058                Discharge Planning   JULIO C:      Code Status: Full Code   Is the patient medically ready for discharge?:     Reason for patient still in hospital (select all that apply): Patient new problem, Laboratory test and Treatment                     Matt Duffy MD  Department of Hospital Medicine   [facility]

## 2022-07-04 NOTE — ASSESSMENT & PLAN NOTE
CK 1842  FLORECITA possibly a complication of rhabdo, although CK not significantly elevated  Received fluid bolus due to sepsis  Hold further fluid due to HF  Trend CK

## 2022-07-04 NOTE — ASSESSMENT & PLAN NOTE
Consistent with NSTEMI in setting of acidosis and acute renal failure.  Type 2 event suspected.  Echocardiogram confirms normal ejection fraction and wall motion.  She presented with critical illness.

## 2022-07-04 NOTE — SUBJECTIVE & OBJECTIVE
Interval History:  No new problems cardiac wise overnight.  Her blood pressures have been stable.    Review of Systems   Constitutional: Negative for chills, decreased appetite, diaphoresis, fever, malaise/fatigue, night sweats, weight gain and weight loss.   HENT:  Negative for congestion, ear discharge, ear pain, hearing loss, hoarse voice, nosebleeds, odynophagia, sore throat, stridor and tinnitus.    Eyes:  Negative for blurred vision, discharge, double vision, pain, photophobia, redness, vision loss in left eye, vision loss in right eye, visual disturbance and visual halos.   Cardiovascular:  Negative for chest pain, claudication, cyanosis, dyspnea on exertion, irregular heartbeat, leg swelling, near-syncope, orthopnea, palpitations, paroxysmal nocturnal dyspnea and syncope.   Respiratory:  Negative for cough, hemoptysis, shortness of breath, sleep disturbances due to breathing, snoring, sputum production and wheezing.    Endocrine: Negative for cold intolerance, heat intolerance, polydipsia, polyphagia and polyuria.   Hematologic/Lymphatic: Negative for adenopathy and bleeding problem. Does not bruise/bleed easily.   Skin:  Negative for color change, dry skin, flushing, itching, nail changes, poor wound healing, rash, skin cancer, suspicious lesions and unusual hair distribution.   Musculoskeletal:  Positive for myalgias. Negative for arthritis, back pain, falls, gout, joint pain, joint swelling, muscle cramps, muscle weakness, neck pain and stiffness.   Gastrointestinal:  Negative for bloating, abdominal pain, anorexia, change in bowel habit, bowel incontinence, constipation, diarrhea, dysphagia, excessive appetite, flatus, heartburn, hematemesis, hematochezia, hemorrhoids, jaundice, melena, nausea and vomiting.   Genitourinary:  Negative for bladder incontinence, decreased libido, dysuria, flank pain, frequency, genital sores, hematuria, hesitancy, incomplete emptying, nocturia and urgency.   Neurological:   Positive for weakness. Negative for aphonia, brief paralysis, difficulty with concentration, disturbances in coordination, excessive daytime sleepiness, dizziness, focal weakness, headaches, light-headedness, loss of balance, numbness, paresthesias, seizures, sensory change, tremors and vertigo.   Psychiatric/Behavioral:  Negative for altered mental status, depression, hallucinations, memory loss, substance abuse, suicidal ideas and thoughts of violence. The patient is nervous/anxious. The patient does not have insomnia.    Allergic/Immunologic: Negative for hives and persistent infections.   Objective:     Vital Signs (Most Recent):  Temp: 99.5 °F (37.5 °C) (07/04/22 1101)  Pulse: 94 (07/04/22 1101)  Resp: 18 (07/04/22 1101)  BP: (!) 151/64 (07/04/22 1101)  SpO2: 98 % (07/04/22 0835)   Vital Signs (24h Range):  Temp:  [99 °F (37.2 °C)-99.7 °F (37.6 °C)] 99.5 °F (37.5 °C)  Pulse:  [] 94  Resp:  [16-20] 18  SpO2:  [95 %-99 %] 98 %  BP: (123-152)/(58-69) 151/64     Weight: 86.2 kg (190 lb)  Body mass index is 30.67 kg/m².     SpO2: 98 %  O2 Device (Oxygen Therapy): room air      Intake/Output Summary (Last 24 hours) at 7/4/2022 1127  Last data filed at 7/4/2022 0517  Gross per 24 hour   Intake 1818.81 ml   Output 300 ml   Net 1518.81 ml       Lines/Drains/Airways       Drain  Duration             Female External Urinary Catheter 07/03/22 0100 1 day              Peripheral Intravenous Line  Duration                  Peripheral IV - Single Lumen 07/02/22 2008 20 G Right Antecubital 1 day                    Physical Exam  Constitutional:       General: She is not in acute distress.     Appearance: She is well-developed. She is not diaphoretic.   HENT:      Head: Normocephalic.   Eyes:      Pupils: Pupils are equal, round, and reactive to light.   Neck:      Vascular: No JVD.   Cardiovascular:      Rate and Rhythm: Normal rate and regular rhythm.      Heart sounds: Normal heart sounds. No murmur heard.    No  friction rub. No gallop.   Abdominal:      General: Bowel sounds are normal. There is no distension.      Palpations: Abdomen is soft.      Tenderness: There is no abdominal tenderness. There is no rebound.   Musculoskeletal:         General: No tenderness.   Skin:     General: Skin is warm and dry.   Neurological:      Mental Status: She is alert and oriented to person, place, and time.      Cranial Nerves: No cranial nerve deficit.      Coordination: Coordination normal.   Psychiatric:         Behavior: Behavior normal.         Thought Content: Thought content normal.         Judgment: Judgment normal.       Significant Labs: Blood Culture:   Recent Labs   Lab 07/02/22 2213   LABBLOO Gram stain aer bottle: Gram negative rods   Results called to and read back by: Renee Lancaster RN  07/04/2022  01:29  No Growth to date   , BMP:   Recent Labs   Lab 07/02/22 2012 07/03/22  0538 07/04/22  0712   * 128* 115*    142 146*   K 4.8 4.0 4.0    110 114*   CO2 19* 20* 22*   BUN 38* 39* 46*   CREATININE 3.9* 3.0* 2.8*   CALCIUM 9.4 8.1* 8.1*   MG 2.2 1.9 2.1   , CBC   Recent Labs   Lab 07/02/22 2012 07/03/22  0538 07/04/22  0712   WBC 22.03* 15.90* 10.88   HGB 11.7* 9.2* 8.5*   HCT 38.1 29.1* 28.4*    163 159   , Troponin   Recent Labs   Lab 07/02/22 2012 07/03/22 0252 07/03/22  0538   TROPONINI 0.279* 0.173* 0.164*   , and All pertinent lab results from the last 24 hours have been reviewed.    Significant Imaging:

## 2022-07-04 NOTE — ASSESSMENT & PLAN NOTE
Likely demand ischemia 2/2 sepsis  Trop trending down. No EKG changes or c/o chest pain  TTE noted

## 2022-07-04 NOTE — PROGRESS NOTES
[facility]  Cardiology  Progress Note    Patient Name: Patito Domingo  MRN: [id number]  Admission Date: 7/2/2022  Hospital Length of Stay: 2 days  Code Status: Full Code   Attending Physician: Matt Duffy MD   Primary Care Physician: Mariela Wei DO  Expected Discharge Date:   Principal Problem:Sepsis    Subjective:     Hospital Course:   7/4/2022 She consulted for elevated troponin.  She did not have further elevation of admitting troponin.  Her echo was reviewed today confirming normal ejection fraction.  Her acute renal failure lab values are improving some.  She still has a lot of musculoskeletal upper torso pain.  She states that she had a fall accident at home.  She was markedly dehydrated.  There is evidence of urinary tract infection.  She has no shortness of breath. There is no cardiac history documented in the past.      Interval History:  No new problems cardiac wise overnight.  Her blood pressures have been stable.    Review of Systems   Constitutional: Negative for chills, decreased appetite, diaphoresis, fever, malaise/fatigue, night sweats, weight gain and weight loss.   HENT:  Negative for congestion, ear discharge, ear pain, hearing loss, hoarse voice, nosebleeds, odynophagia, sore throat, stridor and tinnitus.    Eyes:  Negative for blurred vision, discharge, double vision, pain, photophobia, redness, vision loss in left eye, vision loss in right eye, visual disturbance and visual halos.   Cardiovascular:  Negative for chest pain, claudication, cyanosis, dyspnea on exertion, irregular heartbeat, leg swelling, near-syncope, orthopnea, palpitations, paroxysmal nocturnal dyspnea and syncope.   Respiratory:  Negative for cough, hemoptysis, shortness of breath, sleep disturbances due to breathing, snoring, sputum production and wheezing.    Endocrine: Negative for cold intolerance, heat intolerance, polydipsia, polyphagia and polyuria.   Hematologic/Lymphatic: Negative for adenopathy  and bleeding problem. Does not bruise/bleed easily.   Skin:  Negative for color change, dry skin, flushing, itching, nail changes, poor wound healing, rash, skin cancer, suspicious lesions and unusual hair distribution.   Musculoskeletal:  Positive for myalgias. Negative for arthritis, back pain, falls, gout, joint pain, joint swelling, muscle cramps, muscle weakness, neck pain and stiffness.   Gastrointestinal:  Negative for bloating, abdominal pain, anorexia, change in bowel habit, bowel incontinence, constipation, diarrhea, dysphagia, excessive appetite, flatus, heartburn, hematemesis, hematochezia, hemorrhoids, jaundice, melena, nausea and vomiting.   Genitourinary:  Negative for bladder incontinence, decreased libido, dysuria, flank pain, frequency, genital sores, hematuria, hesitancy, incomplete emptying, nocturia and urgency.   Neurological:  Positive for weakness. Negative for aphonia, brief paralysis, difficulty with concentration, disturbances in coordination, excessive daytime sleepiness, dizziness, focal weakness, headaches, light-headedness, loss of balance, numbness, paresthesias, seizures, sensory change, tremors and vertigo.   Psychiatric/Behavioral:  Negative for altered mental status, depression, hallucinations, memory loss, substance abuse, suicidal ideas and thoughts of violence. The patient is nervous/anxious. The patient does not have insomnia.    Allergic/Immunologic: Negative for hives and persistent infections.   Objective:     Vital Signs (Most Recent):  Temp: 99.5 °F (37.5 °C) (07/04/22 1101)  Pulse: 94 (07/04/22 1101)  Resp: 18 (07/04/22 1101)  BP: (!) 151/64 (07/04/22 1101)  SpO2: 98 % (07/04/22 0835)   Vital Signs (24h Range):  Temp:  [99 °F (37.2 °C)-99.7 °F (37.6 °C)] 99.5 °F (37.5 °C)  Pulse:  [] 94  Resp:  [16-20] 18  SpO2:  [95 %-99 %] 98 %  BP: (123-152)/(58-69) 151/64     Weight: 86.2 kg (190 lb)  Body mass index is 30.67 kg/m².     SpO2: 98 %  O2 Device (Oxygen Therapy):  room air      Intake/Output Summary (Last 24 hours) at 7/4/2022 1127  Last data filed at 7/4/2022 0517  Gross per 24 hour   Intake 1818.81 ml   Output 300 ml   Net 1518.81 ml       Lines/Drains/Airways       Drain  Duration             Female External Urinary Catheter 07/03/22 0100 1 day              Peripheral Intravenous Line  Duration                  Peripheral IV - Single Lumen 07/02/22 2008 20 G Right Antecubital 1 day                    Physical Exam  Constitutional:       General: She is not in acute distress.     Appearance: She is well-developed. She is not diaphoretic.   HENT:      Head: Normocephalic.   Eyes:      Pupils: Pupils are equal, round, and reactive to light.   Neck:      Vascular: No JVD.   Cardiovascular:      Rate and Rhythm: Normal rate and regular rhythm.      Heart sounds: Normal heart sounds. No murmur heard.    No friction rub. No gallop.   Abdominal:      General: Bowel sounds are normal. There is no distension.      Palpations: Abdomen is soft.      Tenderness: There is no abdominal tenderness. There is no rebound.   Musculoskeletal:         General: No tenderness.   Skin:     General: Skin is warm and dry.   Neurological:      Mental Status: She is alert and oriented to person, place, and time.      Cranial Nerves: No cranial nerve deficit.      Coordination: Coordination normal.   Psychiatric:         Behavior: Behavior normal.         Thought Content: Thought content normal.         Judgment: Judgment normal.       Significant Labs: Blood Culture:   Recent Labs   Lab 07/02/22  2213   LABBLOO Gram stain aer bottle: Gram negative rods   Results called to and read back by: Renee Lancaster RN  07/04/2022  01:29  No Growth to date   , BMP:   Recent Labs   Lab 07/02/22 2012 07/03/22  0538 07/04/22  0712   * 128* 115*    142 146*   K 4.8 4.0 4.0    110 114*   CO2 19* 20* 22*   BUN 38* 39* 46*   CREATININE 3.9* 3.0* 2.8*   CALCIUM 9.4 8.1* 8.1*   MG 2.2 1.9 2.1   , CBC    Recent Labs   Lab 07/02/22 2012 07/03/22  0538 07/04/22  0712   WBC 22.03* 15.90* 10.88   HGB 11.7* 9.2* 8.5*   HCT 38.1 29.1* 28.4*    163 159   , Troponin   Recent Labs   Lab 07/02/22 2012 07/03/22  0252 07/03/22  0538   TROPONINI 0.279* 0.173* 0.164*   , and All pertinent lab results from the last 24 hours have been reviewed.    Significant Imaging:     Assessment and Plan:     Brief HPI:  Admitted with acute renal failure.  She is on chemotherapy for carcinoid malignancy.  She has a history of hypertension.    * Sepsis  Blood cultures negative thus far.      Elevated troponin  Consistent with NSTEMI in setting of acidosis and acute renal failure.  Type 2 event suspected.  Echocardiogram confirms normal ejection fraction and wall motion.  She presented with critical illness.    FLORECITA (acute kidney injury)  Pre renal picture.  This morning serum creatinine is 3.0.    Essential hypertension  Low-dose metoprolol 12.5 mg b.i.d. continued on admission.  Blood pressure is controlled.    Metastatic carcinoid tumor  Active condition with metastatic disease to liver.  She is getting chemotherapy.  She has had nausea vomiting abdominal pain and diarrhea as an outpatient.  Fluid resuscitation on board.        VTE Risk Mitigation (From admission, onward)         Ordered     heparin (porcine) injection 5,000 Units  Every 8 hours         07/03/22 0058     IP VTE HIGH RISK PATIENT  Once         07/03/22 0058     Place sequential compression device  Until discontinued         07/03/22 0058            I will sign off for now.  Please call if I can be of assistance.    Bert Bassett MD  Cardiology  [facility]

## 2022-07-04 NOTE — NURSING
Aerobic blood culture from 7/2 at 2213 positive for Gram neg rods. SBmelvin NP notified,see critical value flowsheet,no new orders placed at this entry.

## 2022-07-04 NOTE — HOSPITAL COURSE
7/4/2022 She consulted for elevated troponin.  She did not have further elevation of admitting troponin.  Her echo was reviewed today confirming normal ejection fraction.  Her acute renal failure lab values are improving some.  She still has a lot of musculoskeletal upper torso pain.  She states that she had a fall accident at home.  She was markedly dehydrated.  There is evidence of urinary tract infection.  She has no shortness of breath. There is no cardiac history documented in the past.

## 2022-07-04 NOTE — NURSING
Pt reports pain 10/10 greatest in her bilateral shoulders, warm compress applied with only mild relief, notified Dr. Duffy, new orders received.

## 2022-07-05 PROBLEM — I24.89 DEMAND ISCHEMIA: Status: ACTIVE | Noted: 2022-07-03

## 2022-07-05 LAB
ALBUMIN SERPL BCP-MCNC: 2.2 G/DL (ref 3.5–5.2)
ALP SERPL-CCNC: 93 U/L (ref 55–135)
ALT SERPL W/O P-5'-P-CCNC: 22 U/L (ref 10–44)
ANION GAP SERPL CALC-SCNC: 9 MMOL/L (ref 8–16)
AST SERPL-CCNC: 29 U/L (ref 10–40)
BACTERIA UR CULT: ABNORMAL
BASOPHILS # BLD AUTO: 0.01 K/UL (ref 0–0.2)
BASOPHILS NFR BLD: 0.2 % (ref 0–1.9)
BILIRUB SERPL-MCNC: 0.5 MG/DL (ref 0.1–1)
BUN SERPL-MCNC: 42 MG/DL (ref 8–23)
CALCIUM SERPL-MCNC: 8.7 MG/DL (ref 8.7–10.5)
CHLORIDE SERPL-SCNC: 111 MMOL/L (ref 95–110)
CK SERPL-CCNC: 1351 U/L (ref 20–180)
CO2 SERPL-SCNC: 24 MMOL/L (ref 23–29)
CREAT SERPL-MCNC: 2.1 MG/DL (ref 0.5–1.4)
DIFFERENTIAL METHOD: ABNORMAL
EOSINOPHIL # BLD AUTO: 0.1 K/UL (ref 0–0.5)
EOSINOPHIL NFR BLD: 0.9 % (ref 0–8)
ERYTHROCYTE [DISTWIDTH] IN BLOOD BY AUTOMATED COUNT: 16.5 % (ref 11.5–14.5)
EST. GFR  (AFRICAN AMERICAN): 27 ML/MIN/1.73 M^2
EST. GFR  (NON AFRICAN AMERICAN): 23 ML/MIN/1.73 M^2
GLUCOSE SERPL-MCNC: 115 MG/DL (ref 70–110)
HCT VFR BLD AUTO: 27.8 % (ref 37–48.5)
HGB BLD-MCNC: 8.4 G/DL (ref 12–16)
IMM GRANULOCYTES # BLD AUTO: 0.03 K/UL (ref 0–0.04)
IMM GRANULOCYTES NFR BLD AUTO: 0.5 % (ref 0–0.5)
LYMPHOCYTES # BLD AUTO: 0.7 K/UL (ref 1–4.8)
LYMPHOCYTES NFR BLD: 11.4 % (ref 18–48)
MAGNESIUM SERPL-MCNC: 1.9 MG/DL (ref 1.6–2.6)
MCH RBC QN AUTO: 25.9 PG (ref 27–31)
MCHC RBC AUTO-ENTMCNC: 30.2 G/DL (ref 32–36)
MCV RBC AUTO: 86 FL (ref 82–98)
MONOCYTES # BLD AUTO: 0.6 K/UL (ref 0.3–1)
MONOCYTES NFR BLD: 9.6 % (ref 4–15)
NEUTROPHILS # BLD AUTO: 5 K/UL (ref 1.8–7.7)
NEUTROPHILS NFR BLD: 77.4 % (ref 38–73)
NRBC BLD-RTO: 0 /100 WBC
PHOSPHATE SERPL-MCNC: 2.9 MG/DL (ref 2.7–4.5)
PLATELET # BLD AUTO: 162 K/UL (ref 150–450)
PMV BLD AUTO: 11.5 FL (ref 9.2–12.9)
POCT GLUCOSE: 107 MG/DL (ref 70–110)
POCT GLUCOSE: 122 MG/DL (ref 70–110)
POCT GLUCOSE: 125 MG/DL (ref 70–110)
POCT GLUCOSE: 155 MG/DL (ref 70–110)
POTASSIUM SERPL-SCNC: 3.9 MMOL/L (ref 3.5–5.1)
PROT SERPL-MCNC: 6.1 G/DL (ref 6–8.4)
RBC # BLD AUTO: 3.24 M/UL (ref 4–5.4)
SODIUM SERPL-SCNC: 144 MMOL/L (ref 136–145)
WBC # BLD AUTO: 6.47 K/UL (ref 3.9–12.7)

## 2022-07-05 PROCEDURE — 94761 N-INVAS EAR/PLS OXIMETRY MLT: CPT

## 2022-07-05 PROCEDURE — 25000003 PHARM REV CODE 250: Performed by: NURSE PRACTITIONER

## 2022-07-05 PROCEDURE — 36415 COLL VENOUS BLD VENIPUNCTURE: CPT | Performed by: INTERNAL MEDICINE

## 2022-07-05 PROCEDURE — 25000003 PHARM REV CODE 250: Performed by: HOSPITALIST

## 2022-07-05 PROCEDURE — 63600175 PHARM REV CODE 636 W HCPCS: Performed by: HOSPITALIST

## 2022-07-05 PROCEDURE — 82550 ASSAY OF CK (CPK): CPT | Performed by: INTERNAL MEDICINE

## 2022-07-05 PROCEDURE — 83735 ASSAY OF MAGNESIUM: CPT | Performed by: INTERNAL MEDICINE

## 2022-07-05 PROCEDURE — 11000001 HC ACUTE MED/SURG PRIVATE ROOM

## 2022-07-05 PROCEDURE — 85025 COMPLETE CBC W/AUTO DIFF WBC: CPT | Performed by: INTERNAL MEDICINE

## 2022-07-05 PROCEDURE — 25000003 PHARM REV CODE 250: Performed by: INTERNAL MEDICINE

## 2022-07-05 PROCEDURE — 80053 COMPREHEN METABOLIC PANEL: CPT | Performed by: INTERNAL MEDICINE

## 2022-07-05 PROCEDURE — 84100 ASSAY OF PHOSPHORUS: CPT | Performed by: INTERNAL MEDICINE

## 2022-07-05 RX ORDER — OXYCODONE HYDROCHLORIDE 5 MG/1
10 TABLET ORAL EVERY 6 HOURS PRN
Status: DISCONTINUED | OUTPATIENT
Start: 2022-07-05 | End: 2022-07-12 | Stop reason: HOSPADM

## 2022-07-05 RX ORDER — SODIUM CHLORIDE 9 MG/ML
INJECTION, SOLUTION INTRAVENOUS CONTINUOUS
Status: DISCONTINUED | OUTPATIENT
Start: 2022-07-05 | End: 2022-07-06

## 2022-07-05 RX ORDER — OXYCODONE HYDROCHLORIDE 5 MG/1
5 TABLET ORAL ONCE
Status: COMPLETED | OUTPATIENT
Start: 2022-07-05 | End: 2022-07-05

## 2022-07-05 RX ORDER — OXYCODONE HYDROCHLORIDE 5 MG/1
5 TABLET ORAL EVERY 6 HOURS PRN
Status: DISCONTINUED | OUTPATIENT
Start: 2022-07-05 | End: 2022-07-12 | Stop reason: HOSPADM

## 2022-07-05 RX ORDER — HYDRALAZINE HYDROCHLORIDE 20 MG/ML
10 INJECTION INTRAMUSCULAR; INTRAVENOUS EVERY 6 HOURS PRN
Status: DISCONTINUED | OUTPATIENT
Start: 2022-07-06 | End: 2022-07-12 | Stop reason: HOSPADM

## 2022-07-05 RX ADMIN — OXYCODONE HYDROCHLORIDE AND ACETAMINOPHEN 1 TABLET: 10; 325 TABLET ORAL at 09:07

## 2022-07-05 RX ADMIN — CYANOCOBALAMIN TAB 1000 MCG 1000 MCG: 1000 TAB at 09:07

## 2022-07-05 RX ADMIN — DOCUSATE SODIUM - SENNOSIDES 1 TABLET: 50; 8.6 TABLET, FILM COATED ORAL at 09:07

## 2022-07-05 RX ADMIN — HEPARIN SODIUM 5000 UNITS: 5000 INJECTION INTRAVENOUS; SUBCUTANEOUS at 03:07

## 2022-07-05 RX ADMIN — SODIUM CHLORIDE: 0.9 INJECTION, SOLUTION INTRAVENOUS at 11:07

## 2022-07-05 RX ADMIN — MEROPENEM 1 G: 1 INJECTION, POWDER, FOR SOLUTION INTRAVENOUS at 12:07

## 2022-07-05 RX ADMIN — OXYCODONE 5 MG: 5 TABLET ORAL at 03:07

## 2022-07-05 RX ADMIN — HEPARIN SODIUM 5000 UNITS: 5000 INJECTION INTRAVENOUS; SUBCUTANEOUS at 09:07

## 2022-07-05 RX ADMIN — PANTOPRAZOLE SODIUM 40 MG: 40 TABLET, DELAYED RELEASE ORAL at 09:07

## 2022-07-05 RX ADMIN — MEROPENEM 1 G: 1 INJECTION, POWDER, FOR SOLUTION INTRAVENOUS at 11:07

## 2022-07-05 RX ADMIN — OXYCODONE 10 MG: 5 TABLET ORAL at 02:07

## 2022-07-05 RX ADMIN — HEPARIN SODIUM 5000 UNITS: 5000 INJECTION INTRAVENOUS; SUBCUTANEOUS at 05:07

## 2022-07-05 RX ADMIN — METOPROLOL SUCCINATE 12.5 MG: 25 TABLET, EXTENDED RELEASE ORAL at 09:07

## 2022-07-05 RX ADMIN — BACLOFEN 10 MG: 5 TABLET ORAL at 09:07

## 2022-07-05 RX ADMIN — OXYCODONE 10 MG: 5 TABLET ORAL at 09:07

## 2022-07-05 RX ADMIN — HYDRALAZINE HYDROCHLORIDE 10 MG: 20 INJECTION, SOLUTION INTRAMUSCULAR; INTRAVENOUS at 11:07

## 2022-07-05 NOTE — PLAN OF CARE
Pt AAOx3. Medications administered per order. Cardiac monitor maintained. BG monitored. Pain to lower extremities; pt refusing prn percocet. Requesting oxy IR. NP notified; one time dose administered. Purewick maintained. Encouraged to call with questions/concerns/assistance. Safety.

## 2022-07-05 NOTE — SUBJECTIVE & OBJECTIVE
Interval History: no acute events overnight.  Likely dc tomorrow if repeat BC remain negative.    Review of Systems   Constitutional:  Positive for activity change and fatigue. Negative for appetite change, chills, diaphoresis and fever.   HENT:  Negative for congestion, postnasal drip, rhinorrhea, sinus pressure, sinus pain and sneezing.    Respiratory:  Negative for cough, chest tightness, shortness of breath, wheezing and stridor.    Cardiovascular:  Negative for chest pain, palpitations and leg swelling.   Gastrointestinal:  Negative for abdominal distention, abdominal pain, blood in stool, constipation, diarrhea, nausea and vomiting.   Endocrine: Negative for cold intolerance and heat intolerance.   Genitourinary:  Positive for dysuria, frequency and urgency. Negative for flank pain and hematuria.   Musculoskeletal:  Negative for gait problem.   Neurological:  Positive for weakness. Negative for dizziness.   Psychiatric/Behavioral:  Negative for agitation and behavioral problems.    Objective:     Vital Signs (Most Recent):  Temp: 97.2 °F (36.2 °C) (07/05/22 1522)  Pulse: 79 (07/05/22 1600)  Resp: 18 (07/05/22 1522)  BP: (!) 170/74 (07/05/22 1522)  SpO2: 99 % (07/05/22 0840)   Vital Signs (24h Range):  Temp:  [97.2 °F (36.2 °C)-98.6 °F (37 °C)] 97.2 °F (36.2 °C)  Pulse:  [79-97] 79  Resp:  [15-20] 18  SpO2:  [96 %-99 %] 99 %  BP: (144-176)/(62-74) 170/74     Weight: 86.2 kg (190 lb)  Body mass index is 30.67 kg/m².    Intake/Output Summary (Last 24 hours) at 7/5/2022 1747  Last data filed at 7/5/2022 0707  Gross per 24 hour   Intake 125 ml   Output 700 ml   Net -575 ml      Physical Exam  Constitutional:       General: She is not in acute distress.     Appearance: She is well-developed. She is not ill-appearing or toxic-appearing.   HENT:      Head: Normocephalic and atraumatic.   Neck:      Vascular: No JVD.   Cardiovascular:      Rate and Rhythm: Tachycardia present.   Pulmonary:      Effort: Pulmonary effort  is normal. No respiratory distress.   Abdominal:      Tenderness: Tenderness: diffuse.   Musculoskeletal:         General: Normal range of motion.      Cervical back: Normal range of motion and neck supple.   Neurological:      General: No focal deficit present.      Mental Status: She is alert and oriented to person, place, and time. Mental status is at baseline.      Cranial Nerves: No cranial nerve deficit.   Psychiatric:         Mood and Affect: Mood normal.         Behavior: Behavior normal.       Significant Labs: All pertinent labs within the past 24 hours have been reviewed.  Bilirubin:   Recent Labs   Lab 06/21/22  1212 07/02/22 2012 07/03/22  0538 07/04/22  0712 07/05/22  0509   BILITOT 0.9 0.6 0.4 0.5 0.5     CBC:   Recent Labs   Lab 07/04/22  0712 07/05/22  0509   WBC 10.88 6.47   HGB 8.5* 8.4*   HCT 28.4* 27.8*    162     CMP:   Recent Labs   Lab 07/04/22  0712 07/05/22  0509   * 144   K 4.0 3.9   * 111*   CO2 22* 24   * 115*   BUN 46* 42*   CREATININE 2.8* 2.1*   CALCIUM 8.1* 8.7   PROT 5.4* 6.1   ALBUMIN 2.3* 2.2*   BILITOT 0.5 0.5   ALKPHOS 108 93   AST 43* 29   ALT 23 22   ANIONGAP 10 9   EGFRNONAA 17* 23*     Significant Imaging: I have reviewed all pertinent imaging results/findings within the past 24 hours.

## 2022-07-05 NOTE — CONSULTS
Patito Domingo has been accepted for transfer to [facility] and will be followed through telemedicine services beginning 07/05/22 at 7am. Please complete a hand-off summary in your note.

## 2022-07-05 NOTE — PLAN OF CARE
Sw met with pt to discuss d/c planning. Pt lives alone. Pt has a big support system as she has 4 sons: Ryan [phone], Matias [phone], [place] [phone], and [place] [phone]. Pt stated her son Ryan is the son who helps pt the most. Pt has the following DME: rollator, bath bench, and bsc. Pt is current with [facility]  and wishes to continue with [facility]  upon d/c. Pt stated she takes the health plan transportation to get to doctor appointments. Pt stated that one of her son's might be able to transport pt home but she might need transportation home. Sw encouraged pt to call with any questions or concerns.     Sw sent referral to [facility]  via careport so therefore pt can resume  services upon d/c.     Sw will continue to follow pt for d/c planning.     Future Appointments   Date Time Provider Department Center   7/8/2022  8:00 AM Amy Bright NP [address]   9/1/2022  9:20 AM Ervin Parikh MD [facility] UROLOG[facility]   9/21/2022 10:40 AM DO TARAH Savage [facility]         07/05/22 0920   Discharge Assessment   Assessment Type Discharge Planning Assessment   Confirmed/corrected address, phone number and insurance Yes   Confirmed Demographics Correct on Facesheet   Source of Information patient   Lives With alone   Facility Arrived From: home   Do you expect to return to your current living situation? Yes   Do you have help at home or someone to help you manage your care at home? Yes   Who are your caregiver(s) and their phone number(s)? pt's son Ryan [phone]   Prior to hospitilization cognitive status: Alert/Oriented   Current cognitive status: Alert/Oriented   Walking or Climbing Stairs Difficulty ambulation difficulty, requires equipment   Dressing/Bathing Difficulty bathing difficulty, requires equipment   Equipment Currently Used at Home bedside commode;bath bench;rollator   Readmission within 30 days? No   Patient currently being  followed by outpatient case management? No   Do you currently have service(s) that help you manage your care at home? Yes   Name and Contact number of agency [facility]   Is the pt/caregiver preference to resume services with current agency Yes   Do you take prescription medications? Yes   Do you have prescription coverage? Yes   Coverage [employer]   Do you have any problems affording any of your prescribed medications? No   Is the patient taking medications as prescribed? yes   Who is going to help you get home at discharge? pt's son Ryan [phone]   How do you get to doctors appointments? health plan transportation   Are you on dialysis? No   Discharge Plan A Home Health   DME Needed Upon Discharge    (TBD)   Discharge Plan discussed with: Patient   Discharge Barriers Identified None

## 2022-07-06 ENCOUNTER — TELEPHONE (OUTPATIENT)
Dept: FAMILY MEDICINE | Facility: CLINIC | Age: 72
End: 2022-07-06
Payer: MEDICARE

## 2022-07-06 LAB
ALBUMIN SERPL BCP-MCNC: 2.3 G/DL (ref 3.5–5.2)
ALP SERPL-CCNC: 88 U/L (ref 55–135)
ALT SERPL W/O P-5'-P-CCNC: 20 U/L (ref 10–44)
ANION GAP SERPL CALC-SCNC: 9 MMOL/L (ref 8–16)
AST SERPL-CCNC: 23 U/L (ref 10–40)
BACTERIA BLD CULT: ABNORMAL
BASOPHILS # BLD AUTO: 0.01 K/UL (ref 0–0.2)
BASOPHILS NFR BLD: 0.2 % (ref 0–1.9)
BILIRUB SERPL-MCNC: 0.5 MG/DL (ref 0.1–1)
BUN SERPL-MCNC: 37 MG/DL (ref 8–23)
CALCIUM SERPL-MCNC: 8.2 MG/DL (ref 8.7–10.5)
CHLORIDE SERPL-SCNC: 111 MMOL/L (ref 95–110)
CK SERPL-CCNC: 931 U/L (ref 20–180)
CO2 SERPL-SCNC: 26 MMOL/L (ref 23–29)
CREAT SERPL-MCNC: 1.5 MG/DL (ref 0.5–1.4)
DIFFERENTIAL METHOD: ABNORMAL
EOSINOPHIL # BLD AUTO: 0.1 K/UL (ref 0–0.5)
EOSINOPHIL NFR BLD: 2.5 % (ref 0–8)
ERYTHROCYTE [DISTWIDTH] IN BLOOD BY AUTOMATED COUNT: 16.4 % (ref 11.5–14.5)
EST. GFR  (AFRICAN AMERICAN): 41 ML/MIN/1.73 M^2
EST. GFR  (NON AFRICAN AMERICAN): 35 ML/MIN/1.73 M^2
GLUCOSE SERPL-MCNC: 113 MG/DL (ref 70–110)
HCT VFR BLD AUTO: 27.6 % (ref 37–48.5)
HGB BLD-MCNC: 8.6 G/DL (ref 12–16)
IMM GRANULOCYTES # BLD AUTO: 0.03 K/UL (ref 0–0.04)
IMM GRANULOCYTES NFR BLD AUTO: 0.7 % (ref 0–0.5)
LYMPHOCYTES # BLD AUTO: 0.8 K/UL (ref 1–4.8)
LYMPHOCYTES NFR BLD: 19 % (ref 18–48)
MAGNESIUM SERPL-MCNC: 1.7 MG/DL (ref 1.6–2.6)
MCH RBC QN AUTO: 26.1 PG (ref 27–31)
MCHC RBC AUTO-ENTMCNC: 31.2 G/DL (ref 32–36)
MCV RBC AUTO: 84 FL (ref 82–98)
MONOCYTES # BLD AUTO: 0.8 K/UL (ref 0.3–1)
MONOCYTES NFR BLD: 17.6 % (ref 4–15)
NEUTROPHILS # BLD AUTO: 2.7 K/UL (ref 1.8–7.7)
NEUTROPHILS NFR BLD: 60 % (ref 38–73)
NRBC BLD-RTO: 0 /100 WBC
OB PNL STL: POSITIVE
PHOSPHATE SERPL-MCNC: 2.7 MG/DL (ref 2.7–4.5)
PLATELET # BLD AUTO: 145 K/UL (ref 150–450)
PMV BLD AUTO: 11.3 FL (ref 9.2–12.9)
POCT GLUCOSE: 118 MG/DL (ref 70–110)
POCT GLUCOSE: 125 MG/DL (ref 70–110)
POTASSIUM SERPL-SCNC: 3.9 MMOL/L (ref 3.5–5.1)
PROT SERPL-MCNC: 5.4 G/DL (ref 6–8.4)
RBC # BLD AUTO: 3.29 M/UL (ref 4–5.4)
SODIUM SERPL-SCNC: 146 MMOL/L (ref 136–145)
WBC # BLD AUTO: 4.42 K/UL (ref 3.9–12.7)

## 2022-07-06 PROCEDURE — 82550 ASSAY OF CK (CPK): CPT | Performed by: NURSE PRACTITIONER

## 2022-07-06 PROCEDURE — 25000003 PHARM REV CODE 250: Performed by: HOSPITALIST

## 2022-07-06 PROCEDURE — 63600175 PHARM REV CODE 636 W HCPCS: Performed by: NURSE PRACTITIONER

## 2022-07-06 PROCEDURE — 80053 COMPREHEN METABOLIC PANEL: CPT | Performed by: INTERNAL MEDICINE

## 2022-07-06 PROCEDURE — 83735 ASSAY OF MAGNESIUM: CPT | Performed by: INTERNAL MEDICINE

## 2022-07-06 PROCEDURE — 21400001 HC TELEMETRY ROOM

## 2022-07-06 PROCEDURE — 94761 N-INVAS EAR/PLS OXIMETRY MLT: CPT

## 2022-07-06 PROCEDURE — 85025 COMPLETE CBC W/AUTO DIFF WBC: CPT | Performed by: INTERNAL MEDICINE

## 2022-07-06 PROCEDURE — 63600175 PHARM REV CODE 636 W HCPCS: Performed by: HOSPITALIST

## 2022-07-06 PROCEDURE — 99900035 HC TECH TIME PER 15 MIN (STAT)

## 2022-07-06 PROCEDURE — 84100 ASSAY OF PHOSPHORUS: CPT | Performed by: INTERNAL MEDICINE

## 2022-07-06 PROCEDURE — 36415 COLL VENOUS BLD VENIPUNCTURE: CPT | Performed by: NURSE PRACTITIONER

## 2022-07-06 PROCEDURE — 25000003 PHARM REV CODE 250: Performed by: NURSE PRACTITIONER

## 2022-07-06 PROCEDURE — 82272 OCCULT BLD FECES 1-3 TESTS: CPT | Performed by: INTERNAL MEDICINE

## 2022-07-06 PROCEDURE — 25000003 PHARM REV CODE 250: Performed by: INTERNAL MEDICINE

## 2022-07-06 PROCEDURE — 94760 N-INVAS EAR/PLS OXIMETRY 1: CPT

## 2022-07-06 RX ORDER — SODIUM CHLORIDE 9 MG/ML
INJECTION, SOLUTION INTRAVENOUS CONTINUOUS
Status: DISCONTINUED | OUTPATIENT
Start: 2022-07-06 | End: 2022-07-06

## 2022-07-06 RX ORDER — CLONIDINE HYDROCHLORIDE 0.1 MG/1
0.1 TABLET ORAL EVERY 6 HOURS PRN
Status: DISCONTINUED | OUTPATIENT
Start: 2022-07-06 | End: 2022-07-12 | Stop reason: HOSPADM

## 2022-07-06 RX ORDER — AMLODIPINE BESYLATE 5 MG/1
5 TABLET ORAL DAILY
Status: DISCONTINUED | OUTPATIENT
Start: 2022-07-06 | End: 2022-07-07

## 2022-07-06 RX ADMIN — HEPARIN SODIUM 5000 UNITS: 5000 INJECTION INTRAVENOUS; SUBCUTANEOUS at 09:07

## 2022-07-06 RX ADMIN — OXYCODONE 10 MG: 5 TABLET ORAL at 05:07

## 2022-07-06 RX ADMIN — METOPROLOL SUCCINATE 12.5 MG: 25 TABLET, EXTENDED RELEASE ORAL at 09:07

## 2022-07-06 RX ADMIN — MEROPENEM 1 G: 1 INJECTION, POWDER, FOR SOLUTION INTRAVENOUS at 11:07

## 2022-07-06 RX ADMIN — AMLODIPINE BESYLATE 5 MG: 5 TABLET ORAL at 11:07

## 2022-07-06 RX ADMIN — HYDRALAZINE HYDROCHLORIDE 10 MG: 20 INJECTION, SOLUTION INTRAMUSCULAR; INTRAVENOUS at 09:07

## 2022-07-06 RX ADMIN — CYANOCOBALAMIN TAB 1000 MCG 1000 MCG: 1000 TAB at 09:07

## 2022-07-06 RX ADMIN — HEPARIN SODIUM 5000 UNITS: 5000 INJECTION INTRAVENOUS; SUBCUTANEOUS at 05:07

## 2022-07-06 RX ADMIN — OXYCODONE 10 MG: 5 TABLET ORAL at 03:07

## 2022-07-06 RX ADMIN — OXYCODONE 10 MG: 5 TABLET ORAL at 11:07

## 2022-07-06 RX ADMIN — HEPARIN SODIUM 5000 UNITS: 5000 INJECTION INTRAVENOUS; SUBCUTANEOUS at 02:07

## 2022-07-06 RX ADMIN — OXYCODONE 10 MG: 5 TABLET ORAL at 09:07

## 2022-07-06 RX ADMIN — DOCUSATE SODIUM - SENNOSIDES 1 TABLET: 50; 8.6 TABLET, FILM COATED ORAL at 08:07

## 2022-07-06 RX ADMIN — CLONIDINE HYDROCHLORIDE 0.1 MG: 0.1 TABLET ORAL at 07:07

## 2022-07-06 RX ADMIN — SODIUM CHLORIDE: 0.9 INJECTION, SOLUTION INTRAVENOUS at 05:07

## 2022-07-06 RX ADMIN — MENTHOL, METHYL SALICYLATE: 10; 15 CREAM TOPICAL at 08:07

## 2022-07-06 RX ADMIN — SODIUM CHLORIDE: 0.9 INJECTION, SOLUTION INTRAVENOUS at 04:07

## 2022-07-06 RX ADMIN — DOCUSATE SODIUM - SENNOSIDES 1 TABLET: 50; 8.6 TABLET, FILM COATED ORAL at 09:07

## 2022-07-06 RX ADMIN — PANTOPRAZOLE SODIUM 40 MG: 40 TABLET, DELAYED RELEASE ORAL at 09:07

## 2022-07-06 NOTE — TELEPHONE ENCOUNTER
----- Message from Iris Sweeney sent at 7/6/2022  2:05 PM CDT -----  Type:  Patient Returning Call    Who Called:Dr Hiren Gerber   Would the patient rather a call back or a response via [facility]? Call Back  Best Call Back Number:  Additional Information:    Requesting to speak to provider

## 2022-07-06 NOTE — PROGRESS NOTES
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 She has also noticed abd pain, n/v, increased urinary frequency/urgency and fell at home last night but denies LOC. Pt denies chest pain, sob, palpitations, diarrhea, constipation. Was prescribed keflex yesterday for UTI. Pt noted to be very somnolent and minimally conversant upon arrival to ED. Workup significant for +UA, leukocytosis, lactic acidosis, FLORECITA, elevated troponin. CT abd/pelvis with no acute findings; unchanged size of carcinoid tumor with liver mets and lymphadenopathy. Started on meropenem for UTI due to recent urine culture growing ESBL klebsiella. Mental status has since improved and patient now alert and oriented x4. Patient will be admitted to hospital medicine service for further management.       Overview/Hospital Course:  No notes on file    Interval History: no acute events overnight.  Likely dc tomorrow if repeat BC remain negative.    Review of Systems   Constitutional:  Positive for activity change and fatigue. Negative for appetite change, chills, diaphoresis and fever.   HENT:  Negative for congestion, postnasal drip, rhinorrhea, sinus pressure, sinus pain and sneezing.    Respiratory:  Negative for cough, chest tightness, shortness of breath, wheezing and stridor.    Cardiovascular:  Negative for chest pain, palpitations and leg swelling.   Gastrointestinal:  Negative for abdominal distention, abdominal pain, blood in stool, constipation, diarrhea, nausea and  vomiting.   Endocrine: Negative for cold intolerance and heat intolerance.   Genitourinary:  Positive for dysuria, frequency and urgency. Negative for flank pain and hematuria.   Musculoskeletal:  Negative for gait problem.   Neurological:  Positive for weakness. Negative for dizziness.   Psychiatric/Behavioral:  Negative for agitation and behavioral problems.    Objective:     Vital Signs (Most Recent):  Temp: 97.2 °F (36.2 °C) (07/05/22 1522)  Pulse: 79 (07/05/22 1600)  Resp: 18 (07/05/22 1522)  BP: (!) 170/74 (07/05/22 1522)  SpO2: 99 % (07/05/22 0840)   Vital Signs (24h Range):  Temp:  [97.2 °F (36.2 °C)-98.6 °F (37 °C)] 97.2 °F (36.2 °C)  Pulse:  [79-97] 79  Resp:  [15-20] 18  SpO2:  [96 %-99 %] 99 %  BP: (144-176)/(62-74) 170/74     Weight: 86.2 kg (190 lb)  Body mass index is 30.67 kg/m².    Intake/Output Summary (Last 24 hours) at 7/5/2022 1747  Last data filed at 7/5/2022 0707  Gross per 24 hour   Intake 125 ml   Output 700 ml   Net -575 ml      Physical Exam  Constitutional:       General: She is not in acute distress.     Appearance: She is well-developed. She is not ill-appearing or toxic-appearing.   HENT:      Head: Normocephalic and atraumatic.   Neck:      Vascular: No JVD.   Cardiovascular:      Rate and Rhythm: Tachycardia present.   Pulmonary:      Effort: Pulmonary effort is normal. No respiratory distress.   Abdominal:      Tenderness: Tenderness: diffuse.   Musculoskeletal:         General: Normal range of motion.      Cervical back: Normal range of motion and neck supple.   Neurological:      General: No focal deficit present.      Mental Status: She is alert and oriented to person, place, and time. Mental status is at baseline.      Cranial Nerves: No cranial nerve deficit.   Psychiatric:         Mood and Affect: Mood normal.         Behavior: Behavior normal.       Significant Labs: All pertinent labs within the past 24 hours have been reviewed.  Bilirubin:   Recent Labs   Lab 06/21/22  1212  07/02/22 2012 07/03/22 0538 07/04/22 0712 07/05/22  0509   BILITOT 0.9 0.6 0.4 0.5 0.5     CBC:   Recent Labs   Lab 07/04/22 0712 07/05/22  0509   WBC 10.88 6.47   HGB 8.5* 8.4*   HCT 28.4* 27.8*    162     CMP:   Recent Labs   Lab 07/04/22 0712 07/05/22  0509   * 144   K 4.0 3.9   * 111*   CO2 22* 24   * 115*   BUN 46* 42*   CREATININE 2.8* 2.1*   CALCIUM 8.1* 8.7   PROT 5.4* 6.1   ALBUMIN 2.3* 2.2*   BILITOT 0.5 0.5   ALKPHOS 108 93   AST 43* 29   ALT 23 22   ANIONGAP 10 9   EGFRNONAA 17* 23*     Significant Imaging: I have reviewed all pertinent imaging results/findings within the past 24 hours.      Assessment/Plan:      * Sepsis  This patient does have evidence of infective focus  My overall impression is sepsis. Vital signs were reviewed and noted in progress note.  Antibiotics given-   Antibiotics (From admission, onward)            Start     Stop Route Frequency Ordered    07/03/22 1130  meropenem 1 g in sodium chloride 0.9 % 100 mL IVPB (ready to mix system)         -- IV Every 12 hours (non-standard times) 07/03/22 0101        Cultures were taken-   Microbiology Results (last 7 days)     Procedure Component Value Units Date/Time    Blood culture [[id number]] Collected: 07/04/22 1008    Order Status: Completed Specimen: Blood Updated: 07/05/22 1422     Blood Culture, Routine No Growth to date      No Growth to date    Blood culture [[id number]] Collected: 07/04/22 1008    Order Status: Completed Specimen: Blood Updated: 07/05/22 1422     Blood Culture, Routine No Growth to date      No Growth to date    Blood culture #1 **CANNOT BE ORDERED STAT** [[id number]] Collected: 07/02/22 2213    Order Status: Completed Specimen: Blood Updated: 07/05/22 1212     Blood Culture, Routine No Growth to date      No Growth to date      No Growth to date    Blood culture #2 **CANNOT BE ORDERED STAT** [[id number]]  (Abnormal) Collected: 07/02/22 9849    Order Status: Completed Specimen: Blood  Updated: 07/05/22 1127     Blood Culture, Routine Gram stain aer bottle: Gram negative rods       Results called to and read back by: Renee Lancaster RN  07/04/2022  01:29      ESCHERICHIA COLI  Susceptibility pending      Urine culture [[id number]]  (Abnormal)  (Susceptibility) Collected: 07/02/22 1948    Order Status: Completed Specimen: Urine Updated: 07/05/22 0923     Urine Culture, Routine ESCHERICHIA COLI  >100,000 cfu/ml      Narrative:      Specimen Source->Urine        Latest lactate reviewed, they are-  Recent Labs   Lab 07/03/22  0252 07/03/22  0538   LACTATE 2.1 1.2       Organ dysfunction indicated by Acute kidney injury and Encephalopathy   Source- Urine    Source control Achieved by- Antibiotics for UTI coverage.     Continue meropenem given history of ESBL  Given 2L bolus in ER; will continue with NS at 100 ml/hr given CHF history  Repeat LA and troponin   Resolved          Bacteremia  Blood culture 1 tube growing GNR  Possible seeding from bladder  Cont meropenem  Repeat blood cultures      Non-traumatic rhabdomyolysis  CK 1842  FLORECITA possibly a complication of rhabdo, although CK not significantly elevated  Received fluid bolus due to sepsis  Hold further fluid due to HF  Trend CK-slowly decreasing  Gentle fluids overnight and reassess in am        Lactic acidosis  Due to sepsis  Improving      Demand ischemia  Likely demand ischemia 2/2 sepsis  Trop trending down. No EKG changes or c/o chest pain  TTE noted        Chronic diastolic (congestive) heart failure  No signs of decompensation  Cont metoprolol  TTE showing grade I diastolic dysfunction  Monitor I&O         FLORECITA (acute kidney injury)  Lab Results   Component Value Date    CREATININE 2.1 (H) 07/05/2022     Sr Cr elevated secondary to sepsis  Cont to monitor with daily labs   Avoid nephrotoxins.   Renally dose all medications   Monitor events that may lead to decreased renal perfusion (hypovolemia, hypotension, sepsis).   Monitor urine output (goal  0.5 mL/kg/hr) to assure that no obstruction precipitates worsening in GFR.  Serum bicarb level 19. Anticipate improvement with sepsis management.   FLORECITA possibly due to sepsis vs rhabdo  Gradual improvement        HLD (hyperlipidemia)  Continue atorvastatin     Essential hypertension  Hold losartan due to FLORECITA  Continue metoprolol       Acute cystitis with hematuria  Urine culture from 5/15/22 growing ESBL Klebsiella  Meropenem  Urine culture growing GNR, e coli  Will de-escalate abx if repeat BC remain negative by tomorrow      Metastatic carcinoid tumor  ER physician discussed the case with Dr. Perez with neuroendocrine services  Continue follow up outpatient as scheduled       VTE Risk Mitigation (From admission, onward)         Ordered     heparin (porcine) injection 5,000 Units  Every 8 hours         07/03/22 0058     IP VTE HIGH RISK PATIENT  Once         07/03/22 0058     Place sequential compression device  Until discontinued         07/03/22 0058                      I have assessed these finding virtually using telemed platform and with assistance of bedside nurse                 The attending portion of this evaluation, treatment, and documentation was performed per BERENICE Cook via Telemedicine AudioVisual using the secure [employer] software platform with 2 way audio/video. The provider was located off-site and the patient is located in the hospital. The aforementioned video software was utilized to document the relevant history and physical exam    BERENICE Cook  Department of Hospital Medicine   [facility]

## 2022-07-06 NOTE — ASSESSMENT & PLAN NOTE
Urine culture from 5/15/22 growing ESBL Klebsiella  Meropenem  Urine culture growing GNR, e coli  Febrile overnight.  Continue IV abx and monitor

## 2022-07-06 NOTE — PLAN OF CARE
Future Appointments   Date Time Provider Department Center   7/8/2022  8:00 AM Amy Bright NP [facility]   9/1/2022  9:20 AM Ervin Parikh MD [facility] UROLOGY [facility]   9/21/2022 10:40 AM DO TARAH Savage [facility]         updated [facility] pt's updated d/c date.      07/06/22 1413   Discharge Reassessment   Assessment Type Discharge Planning Reassessment   Did the patient's condition or plan change since previous assessment? No   Discharge Plan discussed with: Patient   Discharge Plan A Home Health   DME Needed Upon Discharge    (TBD)   Discharge Barriers Identified None   Why the patient remains in the hospital Requires continued medical care   Post-Acute Status   Post-Acute Authorization Home Health   Home Health Status Pending medical clearance/testing   Coverage [employer]   Hospital Resources/Appts/Education Provided Appointments scheduled by Navigator/Coordinator   Discharge Delays None known at this time

## 2022-07-06 NOTE — PLAN OF CARE
Pt AAOx4. PRN oxycodone given for complaints of pain. No complaints of N/V. Medications administered per MAR. IVFs initiated. IV hydralazine given x1 for . Cardiac monitoring in progress. Blood glucose monitored. Purewick in place, draining yellow urine. Safety maintained with bed alarm set, side rails raised, and call light in reach.

## 2022-07-06 NOTE — ASSESSMENT & PLAN NOTE
Blood culture 1 tube growing GNR  Possible seeding from bladder  Cont meropenem  Repeat blood cultures-NGTD

## 2022-07-06 NOTE — ASSESSMENT & PLAN NOTE
This patient does have evidence of infective focus  My overall impression is sepsis. Vital signs were reviewed and noted in progress note.  Antibiotics given-   Antibiotics (From admission, onward)            Start     Stop Route Frequency Ordered    07/03/22 1130  meropenem 1 g in sodium chloride 0.9 % 100 mL IVPB (ready to mix system)         -- IV Every 12 hours (non-standard times) 07/03/22 0101        Cultures were taken-   Microbiology Results (last 7 days)     Procedure Component Value Units Date/Time    Blood culture [[id number]] Collected: 07/04/22 1008    Order Status: Completed Specimen: Blood Updated: 07/05/22 1422     Blood Culture, Routine No Growth to date      No Growth to date    Blood culture [[id number]] Collected: 07/04/22 1008    Order Status: Completed Specimen: Blood Updated: 07/05/22 1422     Blood Culture, Routine No Growth to date      No Growth to date    Blood culture #1 **CANNOT BE ORDERED STAT** [[id number]] Collected: 07/02/22 2213    Order Status: Completed Specimen: Blood Updated: 07/05/22 1212     Blood Culture, Routine No Growth to date      No Growth to date      No Growth to date    Blood culture #2 **CANNOT BE ORDERED STAT** [[id number]]  (Abnormal) Collected: 07/02/22 2213    Order Status: Completed Specimen: Blood Updated: 07/05/22 1127     Blood Culture, Routine Gram stain aer bottle: Gram negative rods       Results called to and read back by: Renee Lancaster RN  07/04/2022  01:29      ESCHERICHIA COLI  Susceptibility pending      Urine culture [[id number]]  (Abnormal)  (Susceptibility) Collected: 07/02/22 1948    Order Status: Completed Specimen: Urine Updated: 07/05/22 0923     Urine Culture, Routine ESCHERICHIA COLI  >100,000 cfu/ml      Narrative:      Specimen Source->Urine        Latest lactate reviewed, they are-  Recent Labs   Lab 07/03/22  0252 07/03/22  0538   LACTATE 2.1 1.2       Organ dysfunction indicated by Acute kidney injury and Encephalopathy   Source-  Urine    Source control Achieved by- Antibiotics for UTI coverage.     Continue meropenem given history of ESBL  Given 2L bolus in ER; will continue with NS at 100 ml/hr given CHF history  Repeat LA and troponin   Resolved

## 2022-07-06 NOTE — ASSESSMENT & PLAN NOTE
CK 1842  FLORECITA possibly a complication of rhabdo, although CK not significantly elevated  Received fluid bolus due to sepsis  Hold further fluid due to HF  Trend CK-slowly decreasing  Gentle fluids overnight and reassess in am

## 2022-07-06 NOTE — TELEPHONE ENCOUNTER
----- Message from Corbin Preston sent at 7/6/2022  1:48 PM CDT -----  Contact: [employer]  Type:  Pharmacy Calling to Clarify an RX    Name of Caller: Bert   Pharmacy Name: [facility]  Prescription Name:hydrALAZINE injection 10 mg     What do they need to clarify?: the effects the drugs when taking together  Best Call Back Number: [phone]  Additional Information:

## 2022-07-06 NOTE — PROGRESS NOTES
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 She has also noticed abd pain, n/v, increased urinary frequency/urgency and fell at home last night but denies LOC. Pt denies chest pain, sob, palpitations, diarrhea, constipation. Was prescribed keflex yesterday for UTI. Pt noted to be very somnolent and minimally conversant upon arrival to ED. Workup significant for +UA, leukocytosis, lactic acidosis, FLORECITA, elevated troponin. CT abd/pelvis with no acute findings; unchanged size of carcinoid tumor with liver mets and lymphadenopathy. Started on meropenem for UTI due to recent urine culture growing ESBL klebsiella. Mental status has since improved and patient now alert and oriented x4. Patient will be admitted to hospital medicine service for further management.       Overview/Hospital Course:  No notes on file    Interval History: temp 100.4 overnight.  Renal function better today.  CPK coming down.  Will continue IVF today and anticipate dc tomorrow.    Review of Systems   Constitutional:  Positive for activity change and fatigue. Negative for appetite change, chills, diaphoresis and fever.   HENT:  Negative for congestion, postnasal drip, rhinorrhea, sinus pressure, sinus pain and sneezing.    Respiratory:  Negative for cough, chest tightness, shortness of breath, wheezing and stridor.    Cardiovascular:  Negative for chest pain, palpitations and leg swelling.   Gastrointestinal:  Negative for abdominal distention, abdominal pain, blood in  stool, constipation, diarrhea, nausea and vomiting.   Endocrine: Negative for cold intolerance and heat intolerance.   Genitourinary:  Positive for dysuria, frequency and urgency. Negative for flank pain and hematuria.   Musculoskeletal:  Negative for gait problem.   Neurological:  Positive for weakness. Negative for dizziness.   Psychiatric/Behavioral:  Negative for agitation and behavioral problems.    Objective:     Vital Signs (Most Recent):  Temp: 97 °F (36.1 °C) (07/06/22 1116)  Pulse: 87 (07/06/22 1116)  Resp: 18 (07/06/22 1116)  BP: (!) 176/72 (07/06/22 1116)  SpO2: 97 % (07/06/22 1116)   Vital Signs (24h Range):  Temp:  [97 °F (36.1 °C)-100.4 °F (38 °C)] 97 °F (36.1 °C)  Pulse:  [] 87  Resp:  [16-20] 18  SpO2:  [95 %-98 %] 97 %  BP: (146-190)/(67-93) 176/72     Weight: 86.2 kg (190 lb)  Body mass index is 30.67 kg/m².    Intake/Output Summary (Last 24 hours) at 7/6/2022 1409  Last data filed at 7/6/2022 0534  Gross per 24 hour   Intake 566.08 ml   Output 1450 ml   Net -883.92 ml      Physical Exam  Constitutional:       General: She is not in acute distress.     Appearance: She is well-developed. She is not ill-appearing or toxic-appearing.   HENT:      Head: Normocephalic and atraumatic.   Neck:      Vascular: No JVD.   Cardiovascular:      Rate and Rhythm: Tachycardia present.   Pulmonary:      Effort: Pulmonary effort is normal. No respiratory distress.   Abdominal:      Tenderness: Tenderness: diffuse.   Musculoskeletal:         General: Normal range of motion.      Cervical back: Normal range of motion and neck supple.   Neurological:      General: No focal deficit present.      Mental Status: She is alert and oriented to person, place, and time. Mental status is at baseline.      Cranial Nerves: No cranial nerve deficit.   Psychiatric:         Mood and Affect: Mood normal.         Behavior: Behavior normal.       Significant Labs: All pertinent labs within the past 24 hours have been  reviewed.  BMP:   Recent Labs   Lab 07/06/22  0516   *   *   K 3.9   *   CO2 26   BUN 37*   CREATININE 1.5*   CALCIUM 8.2*   MG 1.7     CBC:   Recent Labs   Lab 07/05/22  0509 07/06/22  0516   WBC 6.47 4.42   HGB 8.4* 8.6*   HCT 27.8* 27.6*    145*       Significant Imaging: I have reviewed all pertinent imaging results/findings within the past 24 hours.      Assessment/Plan:      * Sepsis  This patient does have evidence of infective focus  My overall impression is sepsis. Vital signs were reviewed and noted in progress note.  Antibiotics given-   Antibiotics (From admission, onward)            Start     Stop Route Frequency Ordered    07/03/22 1130  meropenem 1 g in sodium chloride 0.9 % 100 mL IVPB (ready to mix system)         -- IV Every 12 hours (non-standard times) 07/03/22 0101        Cultures were taken-   Microbiology Results (last 7 days)     Procedure Component Value Units Date/Time    Blood culture [[id number]] Collected: 07/04/22 1008    Order Status: Completed Specimen: Blood Updated: 07/05/22 1422     Blood Culture, Routine No Growth to date      No Growth to date    Blood culture [[id number]] Collected: 07/04/22 1008    Order Status: Completed Specimen: Blood Updated: 07/05/22 1422     Blood Culture, Routine No Growth to date      No Growth to date    Blood culture #1 **CANNOT BE ORDERED STAT** [[id number]] Collected: 07/02/22 2213    Order Status: Completed Specimen: Blood Updated: 07/05/22 1212     Blood Culture, Routine No Growth to date      No Growth to date      No Growth to date    Blood culture #2 **CANNOT BE ORDERED STAT** [[id number]]  (Abnormal) Collected: 07/02/22 2213    Order Status: Completed Specimen: Blood Updated: 07/05/22 1127     Blood Culture, Routine Gram stain aer bottle: Gram negative rods       Results called to and read back by: Renee Lancaster RN  07/04/2022  01:29      ESCHERICHIA COLI  Susceptibility pending      Urine culture [[id number]]  (Abnormal)   (Susceptibility) Collected: 07/02/22 1948    Order Status: Completed Specimen: Urine Updated: 07/05/22 0923     Urine Culture, Routine ESCHERICHIA COLI  >100,000 cfu/ml      Narrative:      Specimen Source->Urine        Latest lactate reviewed, they are-  Recent Labs   Lab 07/03/22  0252 07/03/22  0538   LACTATE 2.1 1.2       Organ dysfunction indicated by Acute kidney injury and Encephalopathy   Source- Urine    Source control Achieved by- Antibiotics for UTI coverage.     Continue meropenem given history of ESBL  Given 2L bolus in ER; will continue with NS at 100 ml/hr given CHF history  Repeat LA and troponin   Resolved          Bacteremia  Blood culture 1 tube growing GNR  Possible seeding from bladder  Cont meropenem  Repeat blood cultures-NGTD      Non-traumatic rhabdomyolysis  CK 1842  FLORECITA possibly a complication of rhabdo, although CK not significantly elevated  Received fluid bolus due to sepsis  Hold further fluid due to HF  Trend CK-slowly decreasing  Gentle fluids overnight and reassess in am        Lactic acidosis  Due to sepsis  Improving      Demand ischemia  Likely demand ischemia 2/2 sepsis  Trop trending down. No EKG changes or c/o chest pain  TTE noted        Chronic diastolic (congestive) heart failure  No signs of decompensation  Cont metoprolol  TTE showing grade I diastolic dysfunction  Monitor I&O         FLORECITA (acute kidney injury)  Lab Results   Component Value Date    CREATININE 1.5 (H) 07/06/2022     Sr Cr elevated secondary to sepsis  Cont to monitor with daily labs   Avoid nephrotoxins.   Renally dose all medications   Monitor events that may lead to decreased renal perfusion (hypovolemia, hypotension, sepsis).   Monitor urine output (goal 0.5 mL/kg/hr) to assure that no obstruction precipitates worsening in GFR.  Serum bicarb level 19. Anticipate improvement with sepsis management.   FLORECITA possibly due to sepsis vs rhabdo  Gentle IVFH-improving      HLD (hyperlipidemia)  Continue  atorvastatin     Essential hypertension  Hold losartan due to FLORECITA  Continue metoprolol   Norvasc added      Acute cystitis with hematuria  Urine culture from 5/15/22 growing ESBL Klebsiella  Meropenem  Urine culture growing GNR, e coli  Febrile overnight.  Continue IV abx and monitor      Metastatic carcinoid tumor  ER physician discussed the case with Dr. Perez with neuroendocrine services  Continue follow up outpatient as scheduled     VTE Risk Mitigation (From admission, onward)         Ordered     heparin (porcine) injection 5,000 Units  Every 8 hours         07/03/22 0058     IP VTE HIGH RISK PATIENT  Once         07/03/22 0058     Place sequential compression device  Until discontinued         07/03/22 0058                      I have assessed these finding virtually using telemed platform and with assistance of bedside nurse                 The attending portion of this evaluation, treatment, and documentation was performed per BERENICE Cook via Telemedicine AudioVisual using the secure [employer] software platform with 2 way audio/video. The provider was located off-site and the patient is located in the hospital. The aforementioned video software was utilized to document the relevant history and physical exam    BERENICE Cook  Department of Hospital Medicine   [facility]

## 2022-07-06 NOTE — ASSESSMENT & PLAN NOTE
Urine culture from 5/15/22 growing ESBL Klebsiella  Meropenem  Urine culture growing GNR, e coli  Will de-escalate abx if repeat BC remain negative by tomorrow

## 2022-07-06 NOTE — ASSESSMENT & PLAN NOTE
Lab Results   Component Value Date    CREATININE 2.1 (H) 07/05/2022     Sr Cr elevated secondary to sepsis  Cont to monitor with daily labs   Avoid nephrotoxins.   Renally dose all medications   Monitor events that may lead to decreased renal perfusion (hypovolemia, hypotension, sepsis).   Monitor urine output (goal 0.5 mL/kg/hr) to assure that no obstruction precipitates worsening in GFR.  Serum bicarb level 19. Anticipate improvement with sepsis management.   FLORECITA possibly due to sepsis vs rhabdo  Gradual improvement

## 2022-07-06 NOTE — SUBJECTIVE & OBJECTIVE
Interval History: temp 100.4 overnight.  Renal function better today.  CPK coming down.  Will continue IVF today and anticipate dc tomorrow.    Review of Systems   Constitutional:  Positive for activity change and fatigue. Negative for appetite change, chills, diaphoresis and fever.   HENT:  Negative for congestion, postnasal drip, rhinorrhea, sinus pressure, sinus pain and sneezing.    Respiratory:  Negative for cough, chest tightness, shortness of breath, wheezing and stridor.    Cardiovascular:  Negative for chest pain, palpitations and leg swelling.   Gastrointestinal:  Negative for abdominal distention, abdominal pain, blood in stool, constipation, diarrhea, nausea and vomiting.   Endocrine: Negative for cold intolerance and heat intolerance.   Genitourinary:  Positive for dysuria, frequency and urgency. Negative for flank pain and hematuria.   Musculoskeletal:  Negative for gait problem.   Neurological:  Positive for weakness. Negative for dizziness.   Psychiatric/Behavioral:  Negative for agitation and behavioral problems.    Objective:     Vital Signs (Most Recent):  Temp: 97 °F (36.1 °C) (07/06/22 1116)  Pulse: 87 (07/06/22 1116)  Resp: 18 (07/06/22 1116)  BP: (!) 176/72 (07/06/22 1116)  SpO2: 97 % (07/06/22 1116)   Vital Signs (24h Range):  Temp:  [97 °F (36.1 °C)-100.4 °F (38 °C)] 97 °F (36.1 °C)  Pulse:  [] 87  Resp:  [16-20] 18  SpO2:  [95 %-98 %] 97 %  BP: (146-190)/(67-93) 176/72     Weight: 86.2 kg (190 lb)  Body mass index is 30.67 kg/m².    Intake/Output Summary (Last 24 hours) at 7/6/2022 1409  Last data filed at 7/6/2022 0534  Gross per 24 hour   Intake 566.08 ml   Output 1450 ml   Net -883.92 ml      Physical Exam  Constitutional:       General: She is not in acute distress.     Appearance: She is well-developed. She is not ill-appearing or toxic-appearing.   HENT:      Head: Normocephalic and atraumatic.   Neck:      Vascular: No JVD.   Cardiovascular:      Rate and Rhythm: Tachycardia  present.   Pulmonary:      Effort: Pulmonary effort is normal. No respiratory distress.   Abdominal:      Tenderness: Tenderness: diffuse.   Musculoskeletal:         General: Normal range of motion.      Cervical back: Normal range of motion and neck supple.   Neurological:      General: No focal deficit present.      Mental Status: She is alert and oriented to person, place, and time. Mental status is at baseline.      Cranial Nerves: No cranial nerve deficit.   Psychiatric:         Mood and Affect: Mood normal.         Behavior: Behavior normal.       Significant Labs: All pertinent labs within the past 24 hours have been reviewed.  BMP:   Recent Labs   Lab 07/06/22  0516   *   *   K 3.9   *   CO2 26   BUN 37*   CREATININE 1.5*   CALCIUM 8.2*   MG 1.7     CBC:   Recent Labs   Lab 07/05/22  0509 07/06/22  0516   WBC 6.47 4.42   HGB 8.4* 8.6*   HCT 27.8* 27.6*    145*       Significant Imaging: I have reviewed all pertinent imaging results/findings within the past 24 hours.   Paramedian Forehead Flap Text: A decision was made to reconstruct the defect utilizing an interpolation axial flap and a staged reconstruction.  A telfa template was made of the defect.  This telfa template was then used to outline the paramedian forehead pedicle flap.  The donor area for the pedicle flap was then injected with anesthesia.  The flap was excised through the skin and subcutaneous tissue down to the layer of the underlying musculature.  The pedicle flap was carefully excised within this deep plane to maintain its blood supply.  The edges of the donor site were undermined.   The donor site was closed in a primary fashion.  The pedicle was then rotated into position and sutured.  Once the tube was sutured into place, adequate blood supply was confirmed with blanching and refill.  The pedicle was then wrapped with xeroform gauze and dressed appropriately with a telfa and gauze bandage to ensure continued blood supply and protect the attached pedicle.

## 2022-07-06 NOTE — ASSESSMENT & PLAN NOTE
Lab Results   Component Value Date    CREATININE 1.5 (H) 07/06/2022     Sr Cr elevated secondary to sepsis  Cont to monitor with daily labs   Avoid nephrotoxins.   Renally dose all medications   Monitor events that may lead to decreased renal perfusion (hypovolemia, hypotension, sepsis).   Monitor urine output (goal 0.5 mL/kg/hr) to assure that no obstruction precipitates worsening in GFR.  Serum bicarb level 19. Anticipate improvement with sepsis management.   FLORECITA possibly due to sepsis vs rhabdo  Gentle IVFH-improving

## 2022-07-06 NOTE — TELEPHONE ENCOUNTER
Called [facility] and spoke with Ryann  in regards of message on pt. She informed me that pt's Meclizine and Dicyclomine may cause drugs effects when taking together. Please advise message.

## 2022-07-07 ENCOUNTER — PATIENT OUTREACH (OUTPATIENT)
Dept: ADMINISTRATIVE | Facility: OTHER | Age: 72
End: 2022-07-07
Payer: MEDICARE

## 2022-07-07 PROBLEM — I21.4 NSTEMI (NON-ST ELEVATED MYOCARDIAL INFARCTION): Status: ACTIVE | Noted: 2022-07-03

## 2022-07-07 LAB
ANION GAP SERPL CALC-SCNC: 7 MMOL/L (ref 8–16)
BUN SERPL-MCNC: 27 MG/DL (ref 8–23)
CALCIUM SERPL-MCNC: 8.2 MG/DL (ref 8.7–10.5)
CHLORIDE SERPL-SCNC: 111 MMOL/L (ref 95–110)
CK SERPL-CCNC: 659 U/L (ref 20–180)
CO2 SERPL-SCNC: 28 MMOL/L (ref 23–29)
CREAT SERPL-MCNC: 1 MG/DL (ref 0.5–1.4)
EST. GFR  (AFRICAN AMERICAN): >60 ML/MIN/1.73 M^2
EST. GFR  (NON AFRICAN AMERICAN): 58 ML/MIN/1.73 M^2
GLUCOSE SERPL-MCNC: 115 MG/DL (ref 70–110)
POCT GLUCOSE: 121 MG/DL (ref 70–110)
POTASSIUM SERPL-SCNC: 3.5 MMOL/L (ref 3.5–5.1)
SODIUM SERPL-SCNC: 146 MMOL/L (ref 136–145)

## 2022-07-07 PROCEDURE — 97165 OT EVAL LOW COMPLEX 30 MIN: CPT

## 2022-07-07 PROCEDURE — 25000003 PHARM REV CODE 250: Performed by: NURSE PRACTITIONER

## 2022-07-07 PROCEDURE — 63600175 PHARM REV CODE 636 W HCPCS: Performed by: NURSE PRACTITIONER

## 2022-07-07 PROCEDURE — 94799 UNLISTED PULMONARY SVC/PX: CPT

## 2022-07-07 PROCEDURE — 99900035 HC TECH TIME PER 15 MIN (STAT)

## 2022-07-07 PROCEDURE — 21400001 HC TELEMETRY ROOM

## 2022-07-07 PROCEDURE — 80048 BASIC METABOLIC PNL TOTAL CA: CPT | Performed by: NURSE PRACTITIONER

## 2022-07-07 PROCEDURE — 97530 THERAPEUTIC ACTIVITIES: CPT

## 2022-07-07 PROCEDURE — 94761 N-INVAS EAR/PLS OXIMETRY MLT: CPT

## 2022-07-07 PROCEDURE — 82550 ASSAY OF CK (CPK): CPT | Performed by: NURSE PRACTITIONER

## 2022-07-07 PROCEDURE — 36415 COLL VENOUS BLD VENIPUNCTURE: CPT | Performed by: NURSE PRACTITIONER

## 2022-07-07 PROCEDURE — 97161 PT EVAL LOW COMPLEX 20 MIN: CPT

## 2022-07-07 PROCEDURE — 87040 BLOOD CULTURE FOR BACTERIA: CPT | Mod: 59 | Performed by: NURSE PRACTITIONER

## 2022-07-07 PROCEDURE — 25000003 PHARM REV CODE 250: Performed by: INTERNAL MEDICINE

## 2022-07-07 PROCEDURE — 63600175 PHARM REV CODE 636 W HCPCS: Performed by: HOSPITALIST

## 2022-07-07 PROCEDURE — 99222 PR INITIAL HOSPITAL CARE,LEVL II: ICD-10-PCS | Mod: ,,, | Performed by: INTERNAL MEDICINE

## 2022-07-07 PROCEDURE — 25000003 PHARM REV CODE 250: Performed by: HOSPITALIST

## 2022-07-07 PROCEDURE — 97535 SELF CARE MNGMENT TRAINING: CPT

## 2022-07-07 PROCEDURE — 94760 N-INVAS EAR/PLS OXIMETRY 1: CPT

## 2022-07-07 PROCEDURE — 99222 1ST HOSP IP/OBS MODERATE 55: CPT | Mod: ,,, | Performed by: INTERNAL MEDICINE

## 2022-07-07 RX ORDER — AMLODIPINE BESYLATE 5 MG/1
10 TABLET ORAL DAILY
Status: DISCONTINUED | OUTPATIENT
Start: 2022-07-08 | End: 2022-07-12 | Stop reason: HOSPADM

## 2022-07-07 RX ADMIN — OXYCODONE 10 MG: 5 TABLET ORAL at 11:07

## 2022-07-07 RX ADMIN — MENTHOL, METHYL SALICYLATE: 10; 15 CREAM TOPICAL at 06:07

## 2022-07-07 RX ADMIN — CEFTRIAXONE 1 G: 1 INJECTION, SOLUTION INTRAVENOUS at 09:07

## 2022-07-07 RX ADMIN — HEPARIN SODIUM 5000 UNITS: 5000 INJECTION INTRAVENOUS; SUBCUTANEOUS at 06:07

## 2022-07-07 RX ADMIN — OXYCODONE 10 MG: 5 TABLET ORAL at 06:07

## 2022-07-07 RX ADMIN — OXYCODONE 5 MG: 5 TABLET ORAL at 06:07

## 2022-07-07 RX ADMIN — Medication 6 MG: at 09:07

## 2022-07-07 RX ADMIN — DOCUSATE SODIUM - SENNOSIDES 1 TABLET: 50; 8.6 TABLET, FILM COATED ORAL at 09:07

## 2022-07-07 RX ADMIN — PANTOPRAZOLE SODIUM 40 MG: 40 TABLET, DELAYED RELEASE ORAL at 09:07

## 2022-07-07 RX ADMIN — HYDRALAZINE HYDROCHLORIDE 10 MG: 20 INJECTION, SOLUTION INTRAMUSCULAR; INTRAVENOUS at 05:07

## 2022-07-07 RX ADMIN — CYANOCOBALAMIN TAB 1000 MCG 1000 MCG: 1000 TAB at 09:07

## 2022-07-07 RX ADMIN — METOPROLOL SUCCINATE 12.5 MG: 25 TABLET, EXTENDED RELEASE ORAL at 09:07

## 2022-07-07 RX ADMIN — ACETAMINOPHEN 650 MG: 325 TABLET ORAL at 05:07

## 2022-07-07 RX ADMIN — AMLODIPINE BESYLATE 5 MG: 5 TABLET ORAL at 09:07

## 2022-07-07 RX ADMIN — HEPARIN SODIUM 5000 UNITS: 5000 INJECTION INTRAVENOUS; SUBCUTANEOUS at 09:07

## 2022-07-07 RX ADMIN — OXYCODONE 10 MG: 5 TABLET ORAL at 12:07

## 2022-07-07 RX ADMIN — Medication 1 ENEMA: at 09:07

## 2022-07-07 NOTE — PROGRESS NOTES
07/07/22 1657   Vital Signs   Temp (!) 102.1 °F (38.9 °C)   Temp src Oral   Pulse (!) 114   Heart Rate Source Monitor   Resp 17   SpO2 98 %   BP (!) 190/77   MAP (mmHg) 110   BP Location Right arm   Patient Position Lying      NP notified, orders received for blood cx #3 if pt spikes temp over night. PRN tylenol to be given as well as hydralazine

## 2022-07-07 NOTE — NURSING
Pt's son at bedside, updated to POC. Voiced concerns regarding ambulatory status and plans for d/c tomorrow. Communicated to team, with orders placed for PT/OT eval. Pt with c/o constipation despite BM earlier today, pt assisted to walk to bathroom per request, with another small stool passed--sent to lab for occult blood. Pt repeatedly requesting for this RN to administer an enema, communicated to team with orders placed for a KUB.

## 2022-07-07 NOTE — CONSULTS
[facility] Consult Note    Primary Attending Physician: Naya Calix MD  Consultant Attending: Roby Vann MD    Assessment/Plan:   Ms. 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 coli UTI + bacteremia on cx from 7/2. On Day 3 of CTX, 7/4 blood cx NGTD. Patient afebrile initially but with intermittent low-grade fevers to 100.5 over past 48 hours. ID consulted for persistent fever despite abx.    - presence of large bilateral renal stones complicates picture, although non-obstructive  - would check bilateral DVT US (pt at increased risk given malignancy, with right leg pain this AM although exam benign)  - would repeat blood cx today   - continue CTX 1g IV q 24H for now    Thank you for allowing us to participate in the care of this patient. Please contact me if you have any questions regarding this consult.    Henny Jeffery MD  [facility] PGY-3    Reason for Consult:     Persistently febrile on abx for E. coli UTI/Bacteremia    Subjective:      History of Present Illness:  Ms. 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 Had been prescribed Keflex the day prior in the outpatient setting for UTI.     In the ED, patient was noted to be somnolent and ill-appearing. Workup was significant for +UA (> 100 WBC, many bacteria, 3+ LE) leukocytosis (WBC  22.03), lactic acidosis, FLORECITA, elevated troponin. CT abd/pelvis with no acute findings; unchanged size of carcinoid tumor with liver mets and lymphadenopathy. Started on meropenem for UTI due to recent urine culture growing ESBL klebsiella (5/15/22, 3/27/22, 22). Mental status subsequently improved. Patient admitted to hospital medicine service for further management.     Both 22 urine and  blood cx growing E. coli (fluoroquinolone resistant). Blood cx have since cleared and patient transitioned to CTX this AM. Patient afebrile until night of  when she spiked a temp to 100.4 F. This AM she had another temperature of 100.5 F, so ID was consulted for persistent fever despite abx therapy.      Of note, patient has had multiple admissions this year alone for recurrent UTIs. She has known bilateral nephrolithiasis. CTAP this admission again revealed non-obstructing renal calculi, including large calculus in lower pole of the right kidney, w/o hydronephrosis. Has been seen by Urology but decided against stone retrieval due risks of percutaneous nephrolithotomy, and stones not clearly causing UTIs. She was started on methenamine and Vitamin C for UTI ppx in .    Past Medical History:  Past Medical History:   Diagnosis Date    Allergy     Arthritis     Cataract     Chronic diastolic (congestive) heart failure     Colon cancer     Encounter for blood transfusion     History of ESBL E. 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)        Past Surgical History:  Past Surgical History:   Procedure Laterality Date    ABDOMINAL SURGERY      CATARACT EXTRACTION Left 10/2017     SECTION      CHOLECYSTECTOMY      COLON SURGERY      cystoscope       CYSTOSCOPY W/ RETROGRADES Right 10/10/2019    Procedure: CYSTOSCOPY, WITH RETROGRADE PYELOGRAM;  Surgeon: Gen Isbell MD;  Location: [facility] OR;  Service: Urology;  Laterality: Right;    ERCP N/A 11/24/2021    Procedure: ERCP (ENDOSCOPIC RETROGRADE CHOLANGIOPANCREATOGRAPHY);  Surgeon: Jayjay Rivera MD;  Location: [facility] ENDO (2ND FLR);  Service: Endoscopy;  Laterality: N/A;    ERCP N/A 3/4/2022    Procedure: ERCP (ENDOSCOPIC RETROGRADE CHOLANGIOPANCREATOGRAPHY);  Surgeon: Roby Virgen MD;  Location: [facility] ENDO (2ND FLR);  Service: Endoscopy;  Laterality: N/A;    EYE SURGERY      HYSTERECTOMY  5/1996    LITHOTRIPSY      LIVER BIOPSY  9/14    carcinoid    URETEROSCOPY Right 10/10/2019    Procedure: URETEROSCOPY;  Surgeon: Gen Isbell MD;  Location: [facility] OR;  Service: Urology;  Laterality: Right;    UTERINE FIBROID SURGERY         Allergies:  Review of patient's allergies indicates:   Allergen Reactions    Contrast media Hives, Itching and Swelling    Epinephrine Anaphylaxis     Can cause  a Carcinoid Crisis    Ibuprofen Hives, Itching and Swelling    Zofran [ondansetron hcl] Itching     And multiple other reactions    Iodinated contrast media     Morphine Other (See Comments)    Sulfa (sulfonamide antibiotics) Hives, Itching and Swelling       Medications:   Home Medications:  Prior to Admission medications    Medication Sig Start Date End Date Taking? Authorizing Provider   ascorbic acid, vitamin C, (VITAMIN C) 1000 MG tablet Take 1 tablet (1,000 mg total) by mouth 2 (two) times daily. 6/1/22 11/28/22 Yes Ervin Parikh MD   atorvastatin (LIPITOR) 40 MG tablet Take 1 tablet (40 mg total) by mouth every evening. 3/8/22 3/8/23 Yes Yasri Myers Jr., MD   baclofen (LIORESAL) 10 MG tablet Take 1 tablet (10 mg total) by mouth 2 (two) times daily as needed (muscle cramps). 6/24/22  Yes Mariela Wei DO   cephALEXin (KEFLEX) 500 MG capsule Take 1 capsule (500 mg total) by mouth  every 8 (eight) hours. for 7 days 7/1/22 7/8/22 Yes Amy Bright NP   cyanocobalamin (VITAMIN B-12) 1000 MCG tablet Take 1 tablet (1,000 mcg total) by mouth once daily. 6/27/19  Yes Pantera Rosen MD   diclofenac sodium (VOLTAREN) 1 % Gel Apply 2 g topically 4 (four) times daily as needed (area of pain). 6/21/22  Yes Mariela Wei DO   dicyclomine (BENTYL) 20 mg tablet TAKE 1 TABLET(20 MG) BY MOUTH THREE TIMES DAILY AS NEEDED FOR ABDOMINAL PAIN 6/14/22  Yes BERENICE Rodriguez   diphenoxylate-atropine 2.5-0.025 mg (LOMOTIL) 2.5-0.025 mg per tablet Take 1 tablet by mouth 4 (four) times daily as needed. 6/7/22  Yes Yasir Myers Jr., MD   gabapentin (NEURONTIN) 300 MG capsule Take 1 capsule (300 mg total) by mouth every evening. 6/21/22 6/21/23 Yes Mariela Wei DO   LIDOcaine (LIDODERM) 5 % Place 1 patch onto the skin once daily. Remove & Discard patch within 12 hours or as directed by MD 6/30/22  Yes Tiffani Munoz NP   losartan (COZAAR) 100 MG tablet TAKE 1 TABLET (100 MG TOTAL) BY MOUTH ONCE DAILY. 4/24/22 7/23/22 Yes Yasir Myers Jr., MD   magnesium oxide (MAG-OX) 400 mg (241.3 mg magnesium) tablet Take 1 tablet (400 mg total) by mouth 2 (two) times daily. 4/12/21  Yes Pallavi Sunkara, MD   meclizine (ANTIVERT) 25 mg tablet Take 1 tablet (25 mg total) by mouth 3 (three) times daily as needed for Dizziness. 6/24/22  Yes Mariela Wei DO   methenamine (HIPREX) 1 gram Tab Take 1 tablet (1 g total) by mouth 2 (two) times daily. 6/1/22  Yes Ervin Parikh MD   metoprolol succinate (TOPROL-XL) 25 MG 24 hr tablet Take 0.5 tablets (12.5 mg total) by mouth once daily. 6/21/22 6/21/23 Yes Mariela Wei,    oxyCODONE (ROXICODONE) 10 mg Tab immediate release tablet Take 1 tablet (10 mg total) by mouth every 8 (eight) hours as needed for Pain. 6/21/22 7/21/22 Yes Mariela Wei DO   pantoprazole (PROTONIX) 40 MG tablet Take 1 tablet (40 mg total) by mouth once daily.  22  Yes Mariela Wei DO   phenazopyridine (PYRIDIUM) 100 MG tablet Take 1 tablet (100 mg total) by mouth 3 (three) times daily as needed for Pain. 22 Yes Amy Bright NP       Family History:  Family History   Problem Relation Age of Onset    Cancer Mother         unknown    Alzheimer's disease Father     Stroke Sister     No Known Problems Son     No Known Problems Son     No Known Problems Son     No Known Problems Son     Kidney disease Neg Hx        Social History:  Social History     Tobacco Use    Smoking status: Never Smoker    Smokeless tobacco: Never Used   Substance Use Topics    Alcohol use: No     Alcohol/week: 0.0 standard drinks    Drug use: No       Review of Systems   Constitutional: Positive for chills and malaise/fatigue.   Respiratory: Negative for cough and sputum production.    Cardiovascular: Negative for leg swelling.   Gastrointestinal: Positive for abdominal pain. Negative for nausea and vomiting.   Genitourinary: Negative for flank pain, hematuria and urgency.          Objective:   Last 24 Hour Vital Signs:  BP  Min: 150/70  Max: 199/80  Temp  Av °F (37.2 °C)  Min: 97 °F (36.1 °C)  Max: 100.5 °F (38.1 °C)  Pulse  Av.7  Min: 86  Max: 103  Resp  Av.8  Min: 14  Max: 18  SpO2  Av.6 %  Min: 89 %  Max: 99 %  Weight  Av.9 kg (191 lb 9.3 oz)  Min: 86.9 kg (191 lb 9.3 oz)  Max: 86.9 kg (191 lb 9.3 oz)  I/O last 3 completed shifts:  In: 2268.9 [I.V.:1968.9; IV Piggyback:300]  Out: 3200 [Urine:3200]    Physical Exam  General: Awake, alert, NAD. Obese.  HEENT: NCAT. EOMI intact. MMM.   Neck: Trachea midline.   Lungs: CTAB. No wheezing/rales/rhonchi.   CV: RRR. Normal S1/S2. No murmurs/rubs/gallops.  Abdomen: Soft, NTND. + BS.  Multiple well-healing surgical scars.  Extremities: No cyanosis, clubbing, or edema.   Skin: No rashes or lesions noted.   Neuro: A&O x3. CN II-XII grossly intact.     Laboratory Results:  Most Recent Data:  CBC:    Lab Results   Component Value Date    WBC 4.42 07/06/2022    HGB 8.6 (L) 07/06/2022    HCT 27.6 (L) 07/06/2022     (L) 07/06/2022    MCV 84 07/06/2022    RDW 16.4 (H) 07/06/2022     BMP:   Lab Results   Component Value Date     (H) 07/07/2022    K 3.5 07/07/2022     (H) 07/07/2022    CO2 28 07/07/2022    BUN 27 (H) 07/07/2022     (H) 07/07/2022    CALCIUM 8.2 (L) 07/07/2022    MG 1.7 07/06/2022    PHOS 2.7 07/06/2022     LFTs:   Lab Results   Component Value Date    PROT 5.4 (L) 07/06/2022    ALBUMIN 2.3 (L) 07/06/2022    BILITOT 0.5 07/06/2022    AST 23 07/06/2022    ALKPHOS 88 07/06/2022    ALT 20 07/06/2022     Coags:   Lab Results   Component Value Date    INR 1.1 04/30/2022     FLP:   Lab Results   Component Value Date    CHOL 137 03/08/2021    HDL 73 03/08/2021    LDLCALC 48.6 (L) 03/08/2021    TRIG 77 03/08/2021    CHOLHDL 53.3 (H) 03/08/2021     DM:   Lab Results   Component Value Date    HGBA1C 6.6 (H) 03/28/2022    HGBA1C 6.2 (H) 03/08/2021    HGBA1C 6.6 (H) 02/14/2020    LDLCALC 48.6 (L) 03/08/2021    CREATININE 1.0 07/07/2022     Thyroid:   Lab Results   Component Value Date    TSH 1.458 02/02/2022    FREET4 1.04 11/05/2021     Anemia:   Lab Results   Component Value Date    IRON 11 (L) 07/04/2022    TIBC 262 07/04/2022    FERRITIN 85 07/04/2022    BNEQFEPE52 722 07/04/2022    FOLATE 10.8 07/04/2022     Cardiac:   Lab Results   Component Value Date    TROPONINI 0.164 (H) 07/03/2022    BNP 42 04/30/2022     Urinalysis:   Lab Results   Component Value Date    LABURIN ESCHERICHIA COLI  >100,000 cfu/ml   (A) 07/02/2022    COLORU Yellow 07/02/2022    CLARITYU Clear 03/18/2022    SPECGRAV 1.010 07/02/2022    NITRITE Negative 07/02/2022    KETONESU Negative 07/02/2022    UROBILINOGEN Negative 07/02/2022       Trended Lab Data:  Recent Labs   Lab 07/04/22  0712 07/05/22  0509 07/06/22  0516 07/07/22  0510   WBC 10.88 6.47 4.42  --    HGB 8.5* 8.4* 8.6*  --    HCT 28.4* 27.8* 27.6*  --      162 145*  --    MCV 86 86 84  --    RDW 16.6* 16.5* 16.4*  --    * 144 146* 146*   K 4.0 3.9 3.9 3.5   * 111* 111* 111*   CO2 22* 24 26 28   BUN 46* 42* 37* 27*   * 115* 113* 115*   PROT 5.4* 6.1 5.4*  --    ALBUMIN 2.3* 2.2* 2.3*  --    BILITOT 0.5 0.5 0.5  --    AST 43* 29 23  --    ALKPHOS 108 93 88  --    ALT 23 22 20  --          Microbiology Data:  Microbiology Results (last 7 days)     Procedure Component Value Units Date/Time    Blood culture [[id number]] Collected: 07/04/22 1008    Order Status: Completed Specimen: Blood Updated: 07/06/22 1422     Blood Culture, Routine No Growth to date      No Growth to date      No Growth to date    Blood culture [[id number]] Collected: 07/04/22 1008    Order Status: Completed Specimen: Blood Updated: 07/06/22 1422     Blood Culture, Routine No Growth to date      No Growth to date      No Growth to date    Blood culture #1 **CANNOT BE ORDERED STAT** [[id number]] Collected: 07/02/22 2213    Order Status: Completed Specimen: Blood Updated: 07/06/22 1212     Blood Culture, Routine No Growth to date      No Growth to date      No Growth to date      No Growth to date    Blood culture #2 **CANNOT BE ORDERED STAT** [[id number]]  (Abnormal)  (Susceptibility) Collected: 07/02/22 2213    Order Status: Completed Specimen: Blood Updated: 07/06/22 1011     Blood Culture, Routine Gram stain aer bottle: Gram negative rods       Results called to and read back by: Renee Lancaster RN  07/04/2022  01:29      ESCHERICHIA COLI    Urine culture [[id number]]  (Abnormal)  (Susceptibility) Collected: 07/02/22 1948    Order Status: Completed Specimen: Urine Updated: 07/05/22 0923     Urine Culture, Routine ESCHERICHIA COLI  >100,000 cfu/ml      Narrative:      Specimen Source->Urine          Antimicrobials:  CTX 1g IV x1 (7/2/22)  Meropenem 1g IV q 8H (7/2/22)    Other Results:    Radiology:  X-Ray Abdomen AP 1 View    Result Date: 7/7/2022  EXAMINATION: XR ABDOMEN AP 1  VIEW CLINICAL HISTORY: Constipation; TECHNIQUE: AP View(s) of the abdomen was performed. COMPARISON: 10/15/2020 FINDINGS: Intestinal gas pattern remains normal with no significant distention or obstruction.  Some fecal matter seen in the rectum.  Calcified mass in the right abdomen remains.     See above Electronically signed by: Alfa Mendes MD Date:    07/07/2022 Time:    07:52    X-Ray Chest AP Portable    Result Date: 7/2/2022  EXAMINATION: XR CHEST AP PORTABLE CLINICAL HISTORY: ams; TECHNIQUE: Single frontal view of the chest was performed. COMPARISON: Chest radiograph 03/06/2022. FINDINGS: The lungs are well expanded and clear. No focal opacities are seen. The pleural spaces are clear. The cardiac silhouette is unremarkable. The visualized osseous structures are unremarkable.     No acute cardiopulmonary abnormality. Electronically signed by: Bennie Kiser Date:    07/02/2022 Time:    22:35    Echo    Result Date: 7/4/2022  · The left ventricle is normal in size with moderate concentric hypertrophy and normal systolic function. · The estimated ejection fraction is 70%. · Grade I left ventricular diastolic dysfunction. · Mild tricuspid regurgitation. · The estimated PA systolic pressure is 44 mmHg. · Normal right ventricular size with normal right ventricular systolic function. · There is mild pulmonary hypertension.      CT Abdomen Pelvis  Without Contrast    Result Date: 7/2/2022  EXAMINATION: CT ABDOMEN PELVIS WITHOUT CONTRAST CLINICAL HISTORY: Abdominal abscess/infection suspected;Nausea/vomiting;Abdominal pain, acute, nonlocalized; TECHNIQUE: Multiplanar images were obtained of the abdomen and pelvis from the hemidiaphragms through the symphysis pubis without intravenous contrast. COMPARISON: CT of the abdomen and pelvis from 05/30/2022.  MRI from 05/16/2022. FINDINGS: Lung Bases: Clear. Heart: Heart size is normal.  No pericardial effusion. Liver: There is an ill-defined 2.9 cm hypodensity in the  right hepatic lobe (series 2, image 30), previously measuring 2.6 cm on MRI from 05/16/2022.   There are calcified lesions in the left hepatic lobe, similar to prior.  Multiple additional hepatic lesions are not well seen on this noncontrast CT, better seen on prior MRI. Biliary tract: There is pneumobilia, unchanged.  There is no intra or extrahepatic biliary ductal dilation. Gallbladder: Surgically absent. Pancreas: There is mild fatty atrophy of the pancreas.  There are no focal lesions in the pancreas.  There is no pancreatic ductal dilation. Spleen: Normal size without focal lesion. Adrenals: Normal. Kidneys and urinary collecting systems: There are multiple nonobstructing bilateral renal calculi, including a large calculus in the lower pole right kidney extending into the calices, similar appearance to prior.  There is no hydronephrosis or obstructing stone. Lymph nodes: There is a cardiophrenic lymph node measuring 1.0 cm in short axis (series 2, image 20), stable in size from prior.  There is an 8 mm aortocaval lymph node (series 2, image 67), unchanged in size from prior.  Several additional prominent retroperitoneal nodes are seen. Stomach and bowel: The stomach is normal.  There are postop changes of partial colectomy.  The anastomosis is unremarkable.  There is no bowel wall thickening or pneumatosis.  No obstruction. Peritoneum and mesentery: Again seen is a partially calcified mass in the right upper quadrant adjacent to the SMA (series 2, image 89), similar in size and appearance to prior. Vasculature: Normal. Urinary bladder: There is a focus of air in the urinary bladder which may be related to prior instrumentation, correlate clinically. Reproductive organs: The uterus is absent. Body wall: No abnormality. Musculoskeletal: No aggressive osseous lesion.     1. No acute abnormality of the abdomen or pelvis. 2. Unchanged size of the partially calcified lesion in the right upper quadrant mesentery, in  keeping with patient's reported history of carcinoid. 3. Ill-defined hepatic masses compatible with hepatic metastatic disease, overall better evaluated on prior MRI examinations. 4. Mildly enlarged cardiophrenic and retroperitoneal lymph nodes as above, stable. Electronically signed by: Bennie Kiser Date:    07/02/2022 Time:    22:57

## 2022-07-07 NOTE — PROGRESS NOTES
IP Liaison - Initial Visit Note    Patient: Patito Domingo  MRN:  [id number]  Date of Service:  7/7/2022  Completed by:  MADHURI Matias    Reason for Visit   Patient presents with    IP Liaison Initial Visit       RSW met with patient at bedside in order to complete SDOH questionnaire and liaison assessment. Pt has identified barriers to care of financial resource strain in relation to utility bills.  RSW will follow up with patient to provide pt with available resources.    The following were addressed during this visit:  - Review SDOH Questions   - Complete patient assessment   - Review and discuss options to provide financial support   - Complete initial visit with patient        Patient Summary     IP Liaison Patient Assessment    General  Level of Caregiver support: Member independent and does not need caregiver assistance  Have you had to make a decision between paying for any of the following in the last 2 months?: None  Transportation means: Para transit service  Assessments  Was the PHQ Depression Screening completed this visit?: No  Was the MARGARITA-7 Screening completed this visit?: No         MADHURI Matias

## 2022-07-07 NOTE — PLAN OF CARE
Sw met with pt to discuss d/c planning. Pt is current with [facility]. Pt is agreeable to discharge home and continue services with [facility]. Pt stated that she feels very weak and wants to work with PT/OT. Pt stated she feels she will need SNF or IPR upon d/c. Sw explained to pt about the difference between SNF placement and IPR. Pt is open to transferring to a SNF or IPR upon d/c. Sw informed attending via secure chat. Sw encouraged pt to call with any questions or concerns. Sw will continue to follow pt for d/c planning.     Future Appointments   Date Time Provider Department Center   7/8/2022  8:00 AM Amy Bright NP [address]   9/1/2022  9:20 AM Ervin Parikh MD [facility] UROLOGY [facility]   9/21/2022 10:40 AM DO TARAH [facility] [place]         07/07/22 1437   Post-Acute Status   Post-Acute Authorization Home Health   Coverage [employer] Managed Medicare   Hospital Resources/Appts/Education Provided Appointments scheduled by Navigator/Coordinator   Discharge Delays None known at this time   Discharge Plan   Discharge Plan A Home Health   Discharge Plan B Skilled Nursing Facility

## 2022-07-07 NOTE — PLAN OF CARE
Pt would benefit from cont OT services in order to maximize functional independence. Recommending SNF upon d/c. OT shalom performed this date with PT. Pt with c/o constipation then having episode of bowel incontinence when seated EOB. Pt with c/o R hip pain, per x-ray no fx. Pt requires TotalA for hygiene & diaper management in static standing. Will progress as able.     Problem: Occupational Therapy  Goal: Occupational Therapy Goal  Description: Goals to be met by: 8/7/2022     Patient will increase functional independence with ADLs by performing:    LE Dressing with Set-up Assistance.  Grooming while standing with Set-up Assistance.  Toileting from toilet with Supervision for hygiene and clothing management.   Supine to sit with Modified [place].  Step transfer with Supervision & appropriate AD.   Toilet transfer to toilet with Supervision & appropriate AD.     Outcome: Ongoing, Progressing

## 2022-07-07 NOTE — PLAN OF CARE
"  Problem: Adult Inpatient Plan of Care  Goal: Plan of Care Review  Outcome: Ongoing, Progressing   Pt AAOX4. Safety precautions maintained. Bed low and locked, call light within reach. Medications administered per MAR. Pt tolerating diet well. Purewick monitored for output, UOP adequate for shift. Pt continues to refuse IVF reporting that they make her feel cold. Abx continued per orders. Pt requested enema this AM stating that she only had 2 BMs yesterday and they were inadequate. Pt has since had 3 BMs and c/o "not feeling well" due to having frequent BMs. DVT prophylaxis continued. Hourly rounding performed. Encouraged to call for OOB assistance. Awaiting pt disposition. Pt updated on plan of care and verbalizes understanding.     "

## 2022-07-07 NOTE — PT/OT/SLP EVAL
Occupational Therapy   Evaluation    Name: Patito Domingo  MRN: [id number]  Admitting Diagnosis:  Sepsis  Recent Surgery: * No surgery found *      Recommendations:     Discharge Recommendations: nursing facility, skilled  Discharge Equipment Recommendations:  other (see comments) (TBD)  Barriers to discharge:  Other (Comment) (Pt requires increased level of assistance)    Assessment:     Patito Domingo is a 71 y.o. female with a medical diagnosis of Sepsis.  She presents with The primary encounter diagnosis was Severe sepsis. Diagnoses of Weakness, Urinary tract infection without hematuria, site unspecified, FLORECITA (acute kidney injury), Chest pain, and Syncope and collapse were also pertinent to this visit. Performance deficits affecting function: weakness, impaired functional mobilty, gait instability, decreased safety awareness, pain, impaired endurance, impaired balance, decreased lower extremity function, impaired self care skills, decreased ROM, decreased coordination.      Pt would benefit from cont OT services in order to maximize functional independence. Recommending SNF upon d/c. OT eval performed this date with PT. Pt with c/o constipation then having episode of bowel incontinence when seated EOB. Pt with c/o R hip pain, per x-ray no fx. Pt requires TotalA for hygiene & diaper management in static standing. Will progress as able.     Rehab Prognosis: Good and Fair; patient would benefit from acute skilled OT services to address these deficits and reach maximum level of function.       Plan:     Patient to be seen 3 x/week to address the above listed problems via self-care/home management, therapeutic activities, therapeutic exercises  · Plan of Care Expires: 08/07/22  · Plan of Care Reviewed with: patient    Subjective     Chief Complaint: R hip pain  Patient/Family Comments/goals: Return to PLOF    Occupational Profile:  Living Environment: Pt lives alone, 1st floor apt, 0STE, tub/shower combo with  TTB  Previous level of function: Pt reports Uday, has assistance 1x per week for bathing, pt reports also helps to clean home  Roles and Routines: Does not drive, uses transportation services  Equipment Used at Home:  walker, rolling, wheelchair, rollator, bedside commode, bath bench  Assistance upon Discharge: Limited    Pain/Comfort:  · Pain Rating 1: 9/10  · Location - Side 1: Right  · Location 1: hip  · Pain Addressed 1: Reposition, Distraction, Cessation of Activity, Nurse notified, Other (see comments) (MD notified)  · Pain Rating Post-Intervention 1: other (see comments) (not rated; increased pain end of session after standing)    Patients cultural, spiritual, Anabaptism conflicts given the current situation: no    Objective:     Communicated with: nsg prior to session.  Patient found HOB elevated with bed alarm, PureWick, peripheral IV, telemetry upon OT entry to room.    General Precautions: Standard, fall   Orthopedic Precautions:N/A   Braces: N/A  Respiratory Status: Room air    Occupational Performance:    Bed Mobility:    · Patient completed Supine to Sit with moderate assistance  · Patient completed Sit to Supine with moderate assistance    Functional Mobility/Transfers:  · Patient completed Sit <> Stand Transfer with moderate assistance  with  rolling walker   · Functional Mobility: Pt unable to take steps at this time 2/2 incontinence, fatigue & hip pain; pt requesting to return to sitting    Activities of Daily Living:  · Toileting: total assistance in static standing for hygiene & diaper managment after episode of bowel incontinence    Cognitive/Visual Perceptual:  Cognitive/Psychosocial Skills:     -       Oriented to: Person, Place, Time and Situation   -       Safety awareness/insight to disability: impaired   -       Mood/Affect/Coping skills/emotional control: Cooperative and requires increased encouragment    Physical Exam:  BUE strength/AROM appears to be grossly WFL as noted during  observation during functional mobility & self care  BUE sensation intact  Wound noted to R intergluteal cleft    AMPAC 6 Click ADL:  AMPA Total Score: 16    Treatment & Education:  OT shalom performed this date with PT.   Pt with c/o constipation then having episode of bowel incontinence when seated EOB.   Pt with c/o R hip pain, per x-ray no fx.   Pt performing bed mobility as above.  Pt with significantly decreased endurance.   Pt requires TotalA for hygiene & diaper management in static standing.   Will progress as able.   Education:    Patient left HOB elevated with all lines intact, call button in reach, bed alarm on and nsg notified    GOALS:   Multidisciplinary Problems     Occupational Therapy Goals        Problem: Occupational Therapy    Goal Priority Disciplines Outcome Interventions   Occupational Therapy Goal     OT, PT/OT Ongoing, Progressing    Description: Goals to be met by: 8/7/2022     Patient will increase functional independence with ADLs by performing:    LE Dressing with Set-up Assistance.  Grooming while standing with Set-up Assistance.  Toileting from toilet with Supervision for hygiene and clothing management.   Supine to sit with Modified [place].  Step transfer with Supervision & appropriate AD.   Toilet transfer to toilet with Supervision & appropriate AD.                      History:     Past Medical History:   Diagnosis Date    Allergy     Arthritis     Cataract     Chronic diastolic (congestive) heart failure     Colon cancer     Encounter for blood transfusion     History of ESBL E. 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)        Past Surgical History:   Procedure Laterality Date    ABDOMINAL SURGERY      CATARACT  EXTRACTION Left 10/2017     SECTION      CHOLECYSTECTOMY      COLON SURGERY      cystoscope      CYSTOSCOPY W/ RETROGRADES Right 10/10/2019    Procedure: CYSTOSCOPY, WITH RETROGRADE PYELOGRAM;  Surgeon: Gen Isbell MD;  Location: [facility] OR;  Service: Urology;  Laterality: Right;    ERCP N/A 2021    Procedure: ERCP (ENDOSCOPIC RETROGRADE CHOLANGIOPANCREATOGRAPHY);  Surgeon: Jayjay Rivera MD;  [facility]  Service: Endoscopy;  Laterality: N/A;    ERCP N/A 3/4/2022    Procedure: ERCP (ENDOSCOPIC RETROGRADE CHOLANGIOPANCREATOGRAPHY);  Surgeon: Roby Virgen MD;  Location: [facility] ENDO ([address] [place] [address]);  Service: Endoscopy;  Laterality: N/A;    EYE SURGERY      HYSTERECTOMY  1996    LITHOTRIPSY      LIVER BIOPSY      carcinoid    URETEROSCOPY Right 10/10/2019    Procedure: URETEROSCOPY;  Surgeon: Gen Isbell MD;  Location: [facility] OR;  Service: Urology;  Laterality: Right;    UTERINE FIBROID SURGERY         Time Tracking:     OT Date of Treatment: 22  OT Start Time: 1351 (2187)  OT Stop Time: 1418 (7099)  OT Total Time (min): 27 min (10 min)    Billable Minutes:Evaluation 10 with PT  Self Care/Home Management 14  Therapeutic Activity 13    2022

## 2022-07-07 NOTE — PLAN OF CARE
Pt AAOx4. PRN oxycodone, bengay given for complaints of pain. No complaints of N/V. Medications administered per MAR. On RA. Cardiac monitoring in progress. Purewick in place. Refusing Accuchecks. Po clonidine x1 and IV hydralazine x1 given for elevated SBP. Safety maintained with bed alarm set, side rails raised, and call light in reach.

## 2022-07-07 NOTE — PROGRESS NOTES
Phone call with pts pain management doctor; Dr. Gerber. Pt is known to try and get extra medications from various providers and at times takes too much medication and is at risk of OD, currently has family member who provides her medications as appropriate to avoid this; while she does have pain due to the cancer and masses she has pain contract signed and is only to receive pain medications from pain management doctor. Any issues with pain control should be directed to pain management doctor only.         Mariela Wei DO   [facility]  7/6/22

## 2022-07-07 NOTE — NURSING
Pt refusing PRN hydralazine for elevated BP. Pt provided education on medication and purpose, continues to refuse. Team made aware, with orders placed for daily amlodipine and PRN PO clonidine for SBP>180.

## 2022-07-08 ENCOUNTER — PATIENT OUTREACH (OUTPATIENT)
Dept: ADMINISTRATIVE | Facility: OTHER | Age: 72
End: 2022-07-08
Payer: MEDICARE

## 2022-07-08 LAB
ANION GAP SERPL CALC-SCNC: 16 MMOL/L (ref 8–16)
BACTERIA BLD CULT: NORMAL
BASOPHILS # BLD AUTO: 0.01 K/UL (ref 0–0.2)
BASOPHILS NFR BLD: 0.2 % (ref 0–1.9)
BUN SERPL-MCNC: 22 MG/DL (ref 8–23)
CALCIUM SERPL-MCNC: 8.3 MG/DL (ref 8.7–10.5)
CHLORIDE SERPL-SCNC: 108 MMOL/L (ref 95–110)
CO2 SERPL-SCNC: 20 MMOL/L (ref 23–29)
CREAT SERPL-MCNC: 1 MG/DL (ref 0.5–1.4)
DIFFERENTIAL METHOD: ABNORMAL
EOSINOPHIL # BLD AUTO: 0 K/UL (ref 0–0.5)
EOSINOPHIL NFR BLD: 0.2 % (ref 0–8)
ERYTHROCYTE [DISTWIDTH] IN BLOOD BY AUTOMATED COUNT: 16.6 % (ref 11.5–14.5)
EST. GFR  (AFRICAN AMERICAN): >60 ML/MIN/1.73 M^2
EST. GFR  (NON AFRICAN AMERICAN): 58 ML/MIN/1.73 M^2
GLUCOSE SERPL-MCNC: 102 MG/DL (ref 70–110)
HCT VFR BLD AUTO: 29.1 % (ref 37–48.5)
HGB BLD-MCNC: 8.8 G/DL (ref 12–16)
IMM GRANULOCYTES # BLD AUTO: 0.08 K/UL (ref 0–0.04)
IMM GRANULOCYTES NFR BLD AUTO: 1.6 % (ref 0–0.5)
LYMPHOCYTES # BLD AUTO: 0.7 K/UL (ref 1–4.8)
LYMPHOCYTES NFR BLD: 13.6 % (ref 18–48)
MAGNESIUM SERPL-MCNC: 1.4 MG/DL (ref 1.6–2.6)
MCH RBC QN AUTO: 25.7 PG (ref 27–31)
MCHC RBC AUTO-ENTMCNC: 30.2 G/DL (ref 32–36)
MCV RBC AUTO: 85 FL (ref 82–98)
MONOCYTES # BLD AUTO: 0.8 K/UL (ref 0.3–1)
MONOCYTES NFR BLD: 16.1 % (ref 4–15)
NEUTROPHILS # BLD AUTO: 3.3 K/UL (ref 1.8–7.7)
NEUTROPHILS NFR BLD: 68.3 % (ref 38–73)
NRBC BLD-RTO: 0 /100 WBC
PLATELET # BLD AUTO: 177 K/UL (ref 150–450)
PMV BLD AUTO: 10.9 FL (ref 9.2–12.9)
POTASSIUM SERPL-SCNC: 3.3 MMOL/L (ref 3.5–5.1)
RBC # BLD AUTO: 3.42 M/UL (ref 4–5.4)
SODIUM SERPL-SCNC: 144 MMOL/L (ref 136–145)
WBC # BLD AUTO: 4.85 K/UL (ref 3.9–12.7)

## 2022-07-08 PROCEDURE — 94761 N-INVAS EAR/PLS OXIMETRY MLT: CPT

## 2022-07-08 PROCEDURE — 99232 SBSQ HOSP IP/OBS MODERATE 35: CPT | Mod: ,,, | Performed by: INTERNAL MEDICINE

## 2022-07-08 PROCEDURE — 83735 ASSAY OF MAGNESIUM: CPT | Performed by: NURSE PRACTITIONER

## 2022-07-08 PROCEDURE — 97530 THERAPEUTIC ACTIVITIES: CPT

## 2022-07-08 PROCEDURE — 99900035 HC TECH TIME PER 15 MIN (STAT)

## 2022-07-08 PROCEDURE — 85025 COMPLETE CBC W/AUTO DIFF WBC: CPT | Performed by: NURSE PRACTITIONER

## 2022-07-08 PROCEDURE — 63600175 PHARM REV CODE 636 W HCPCS: Performed by: NURSE PRACTITIONER

## 2022-07-08 PROCEDURE — 86580 TB INTRADERMAL TEST: CPT | Performed by: NURSE PRACTITIONER

## 2022-07-08 PROCEDURE — 80048 BASIC METABOLIC PNL TOTAL CA: CPT | Performed by: NURSE PRACTITIONER

## 2022-07-08 PROCEDURE — 21400001 HC TELEMETRY ROOM

## 2022-07-08 PROCEDURE — 25000003 PHARM REV CODE 250: Performed by: INTERNAL MEDICINE

## 2022-07-08 PROCEDURE — 63600175 PHARM REV CODE 636 W HCPCS: Performed by: HOSPITALIST

## 2022-07-08 PROCEDURE — 36415 COLL VENOUS BLD VENIPUNCTURE: CPT | Performed by: NURSE PRACTITIONER

## 2022-07-08 PROCEDURE — 25000003 PHARM REV CODE 250: Performed by: HOSPITALIST

## 2022-07-08 PROCEDURE — 25000003 PHARM REV CODE 250: Performed by: NURSE PRACTITIONER

## 2022-07-08 PROCEDURE — 99232 PR SUBSEQUENT HOSPITAL CARE,LEVL II: ICD-10-PCS | Mod: ,,, | Performed by: INTERNAL MEDICINE

## 2022-07-08 PROCEDURE — 97530 THERAPEUTIC ACTIVITIES: CPT | Mod: CO

## 2022-07-08 PROCEDURE — 30200315 PPD INTRADERMAL TEST REV CODE 302: Performed by: NURSE PRACTITIONER

## 2022-07-08 RX ORDER — METOPROLOL TARTRATE 25 MG/1
12.5 TABLET ORAL ONCE
Status: COMPLETED | OUTPATIENT
Start: 2022-07-08 | End: 2022-07-08

## 2022-07-08 RX ORDER — METOPROLOL SUCCINATE 25 MG/1
25 TABLET, EXTENDED RELEASE ORAL 2 TIMES DAILY
Status: DISCONTINUED | OUTPATIENT
Start: 2022-07-08 | End: 2022-07-12 | Stop reason: HOSPADM

## 2022-07-08 RX ORDER — POTASSIUM CHLORIDE 20 MEQ/1
40 TABLET, EXTENDED RELEASE ORAL ONCE
Status: COMPLETED | OUTPATIENT
Start: 2022-07-08 | End: 2022-07-08

## 2022-07-08 RX ADMIN — OXYCODONE 5 MG: 5 TABLET ORAL at 05:07

## 2022-07-08 RX ADMIN — OXYCODONE 10 MG: 5 TABLET ORAL at 11:07

## 2022-07-08 RX ADMIN — MENTHOL, METHYL SALICYLATE: 10; 15 CREAM TOPICAL at 11:07

## 2022-07-08 RX ADMIN — METOPROLOL SUCCINATE 12.5 MG: 25 TABLET, EXTENDED RELEASE ORAL at 08:07

## 2022-07-08 RX ADMIN — CEFTRIAXONE 1 G: 1 INJECTION, SOLUTION INTRAVENOUS at 10:07

## 2022-07-08 RX ADMIN — POTASSIUM CHLORIDE 40 MEQ: 1500 TABLET, EXTENDED RELEASE ORAL at 01:07

## 2022-07-08 RX ADMIN — HEPARIN SODIUM 5000 UNITS: 5000 INJECTION INTRAVENOUS; SUBCUTANEOUS at 05:07

## 2022-07-08 RX ADMIN — OXYCODONE 10 MG: 5 TABLET ORAL at 05:07

## 2022-07-08 RX ADMIN — HEPARIN SODIUM 5000 UNITS: 5000 INJECTION INTRAVENOUS; SUBCUTANEOUS at 01:07

## 2022-07-08 RX ADMIN — AMLODIPINE BESYLATE 10 MG: 5 TABLET ORAL at 08:07

## 2022-07-08 RX ADMIN — HEPARIN SODIUM 5000 UNITS: 5000 INJECTION INTRAVENOUS; SUBCUTANEOUS at 11:07

## 2022-07-08 RX ADMIN — METOPROLOL TARTRATE 12.5 MG: 25 TABLET, FILM COATED ORAL at 01:07

## 2022-07-08 RX ADMIN — CYANOCOBALAMIN TAB 1000 MCG 1000 MCG: 1000 TAB at 08:07

## 2022-07-08 RX ADMIN — PANTOPRAZOLE SODIUM 40 MG: 40 TABLET, DELAYED RELEASE ORAL at 08:07

## 2022-07-08 RX ADMIN — DOCUSATE SODIUM - SENNOSIDES 1 TABLET: 50; 8.6 TABLET, FILM COATED ORAL at 08:07

## 2022-07-08 RX ADMIN — METOPROLOL SUCCINATE 25 MG: 25 TABLET, EXTENDED RELEASE ORAL at 11:07

## 2022-07-08 RX ADMIN — DOCUSATE SODIUM - SENNOSIDES 1 TABLET: 50; 8.6 TABLET, FILM COATED ORAL at 11:07

## 2022-07-08 RX ADMIN — TUBERCULIN PURIFIED PROTEIN DERIVATIVE 5 UNITS: 5 INJECTION, SOLUTION INTRADERMAL at 05:07

## 2022-07-08 RX ADMIN — ACETAMINOPHEN 650 MG: 325 TABLET ORAL at 04:07

## 2022-07-08 NOTE — SUBJECTIVE & OBJECTIVE
"Interval History: See "hospital course"      Review of Systems   Constitutional:  Positive for activity change, fatigue and fever. Negative for appetite change, chills and diaphoresis.   HENT:  Negative for congestion, postnasal drip, rhinorrhea, sinus pressure, sinus pain and sneezing.    Respiratory:  Negative for cough, chest tightness, shortness of breath, wheezing and stridor.    Cardiovascular:  Negative for chest pain, palpitations and leg swelling.   Gastrointestinal:  Negative for abdominal distention, abdominal pain, blood in stool, constipation, diarrhea, nausea and vomiting.   Endocrine: Negative for cold intolerance and heat intolerance.   Genitourinary:  Positive for dysuria, frequency and urgency. Negative for flank pain and hematuria.   Musculoskeletal:  Negative for gait problem.   Neurological:  Positive for weakness. Negative for dizziness.   Psychiatric/Behavioral:  Negative for agitation and behavioral problems.    Objective:     Vital Signs (Most Recent):  Temp: 99.1 °F (37.3 °C) (07/07/22 2201)  Pulse: 106 (07/07/22 2141)  Resp: 18 (07/07/22 1918)  BP: (!) 143/63 (07/07/22 1918)  SpO2: 98 % (07/07/22 1918)   Vital Signs (24h Range):  Temp:  [98.8 °F (37.1 °C)-102.5 °F (39.2 °C)] 99.1 °F (37.3 °C)  Pulse:  [] 106  Resp:  [15-18] 18  SpO2:  [96 %-99 %] 98 %  BP: (143-190)/(63-77) 143/63     Weight: 86.9 kg (191 lb 9.3 oz)  Body mass index is 30.92 kg/m².    Intake/Output Summary (Last 24 hours) at 7/7/2022 2237  Last data filed at 7/7/2022 1900  Gross per 24 hour   Intake 584.77 ml   Output 2500 ml   Net -1915.23 ml      Physical Exam  Constitutional:       General: She is not in acute distress.     Appearance: She is well-developed. She is not ill-appearing or toxic-appearing.   HENT:      Head: Normocephalic and atraumatic.   Neck:      Vascular: No JVD.   Cardiovascular:      Rate and Rhythm: Tachycardia present.   Pulmonary:      Effort: Pulmonary effort is normal. No respiratory " distress.   Abdominal:      Tenderness: Tenderness: diffuse.   Musculoskeletal:         General: Normal range of motion.      Cervical back: Normal range of motion and neck supple.   Neurological:      General: No focal deficit present.      Mental Status: She is alert and oriented to person, place, and time. Mental status is at baseline.      Cranial Nerves: No cranial nerve deficit.   Psychiatric:         Mood and Affect: Mood normal.         Behavior: Behavior normal.       Significant Labs: All pertinent labs within the past 24 hours have been reviewed.  Blood Culture: No results for input(s): LABBLOO in the last 48 hours.  BMP:   Recent Labs   Lab 07/06/22  0516 07/07/22  0510   * 115*   * 146*   K 3.9 3.5   * 111*   CO2 26 28   BUN 37* 27*   CREATININE 1.5* 1.0   CALCIUM 8.2* 8.2*   MG 1.7  --      CBC:   Recent Labs   Lab 07/06/22  0516   WBC 4.42   HGB 8.6*   HCT 27.6*   *     Urine Culture: No results for input(s): LABURIN in the last 48 hours.    Significant Imaging: I have reviewed all pertinent imaging results/findings within the past 24 hours.

## 2022-07-08 NOTE — HPI
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 Had been prescribed Keflex the day prior in the outpatient setting for UTI.      In the ED, patient was noted to be somnolent and ill-appearing. Workup was significant for +UA (> 100 WBC, many bacteria, 3+ LE) leukocytosis (WBC 22.03), lactic acidosis, FLORECITA, elevated troponin. CT abd/pelvis with no acute findings; unchanged size of carcinoid tumor with liver mets and lymphadenopathy. Started on meropenem for UTI due to recent urine culture growing ESBL klebsiella (5/15/22, 3/27/22, 2/2/22). Mental status subsequently improved. Patient admitted to hospital medicine service for further management.      Both 7/7/22 urine and 1/2 blood cx growing E. coli (fluoroquinolone resistant). Blood cx have since cleared and patient transitioned to CTX this AM. Patient afebrile until night of 7/5 when she spiked a temp to 100.4 F. This AM she had another temperature of 100.5 F, so ID was consulted for persistent fever despite abx therapy.       Of note, patient has had multiple admissions this year alone for recurrent UTIs. She has known bilateral nephrolithiasis. CTAP this admission again revealed non-obstructing renal calculi, including large calculus in lower pole of the right kidney, w/o hydronephrosis. Has been seen by Urology but decided against stone retrieval due risks of percutaneous nephrolithotomy, and stones not clearly causing UTIs. She was started on methenamine and Vitamin C for UTI ppx in June

## 2022-07-08 NOTE — PROGRESS NOTES
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 coli UTI + bacteremia on cx from 7/2. On Day 3 of CTX, 7/4 blood cx NGTD. Patient afebrile initially but with intermittent low-grade fevers to 100.5 over past 48 hours. ID consulted for persistent fever despite abx.     - presence of large bilateral renal stones complicates picture, although non-obstructive  - bilateral DVT US is negative  - await repeat blood cx  - continue CTX 1g IV q 24H  - possible that fever is related to carcinoid             Thank you for your consult. I will follow-up with patient. Please contact us if you have any additional questions.    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 Had been prescribed Keflex the day prior in the outpatient setting for UTI.      In the ED, patient was noted to be somnolent and ill-appearing. Workup was significant for +UA (> 100 WBC, many bacteria, 3+ LE)  leukocytosis (WBC 22.03), lactic acidosis, FLORECITA, elevated troponin. CT abd/pelvis with no acute findings; unchanged size of carcinoid tumor with liver mets and lymphadenopathy. Started on meropenem for UTI due to recent urine culture growing ESBL klebsiella (5/15/22, 3/27/22, 2/2/22). Mental status subsequently improved. Patient admitted to hospital medicine service for further management.      Both 7/7/22 urine and 1/2 blood cx growing E. coli (fluoroquinolone resistant). Blood cx have since cleared and patient transitioned to CTX this AM. Patient afebrile until night of 7/5 when she spiked a temp to 100.4 F. This AM she had another temperature of 100.5 F, so ID was consulted for persistent fever despite abx therapy.       Of note, patient has had multiple admissions this year alone for recurrent UTIs. She has known bilateral nephrolithiasis. CTAP this admission again revealed non-obstructing renal calculi, including large calculus in lower pole of the right kidney, w/o hydronephrosis. Has been seen by Urology but decided against stone retrieval due risks of percutaneous nephrolithotomy, and stones not clearly causing UTIs. She was started on methenamine and Vitamin C for UTI ppx in June Interval History: No events    Review of Systems   Constitutional:  Positive for activity change. Negative for appetite change, chills, diaphoresis, fatigue and fever.   HENT:  Negative for congestion, postnasal drip, rhinorrhea, sinus pressure, sinus pain and sneezing.    Respiratory:  Negative for cough, chest tightness, shortness of breath, wheezing and stridor.    Cardiovascular:  Negative for chest pain, palpitations and leg swelling.   Gastrointestinal:  Negative for abdominal distention, abdominal pain, blood in stool, constipation, diarrhea, nausea and vomiting.   Endocrine: Negative for cold intolerance and heat intolerance.   Genitourinary:  Positive for dysuria and frequency. Negative for flank pain, hematuria and  urgency.   Musculoskeletal:  Negative for gait problem.   Neurological:  Negative for dizziness and weakness.   Psychiatric/Behavioral:  Negative for agitation and behavioral problems.    All other systems reviewed and are negative.  Objective:     Vital Signs (Most Recent):  Temp: 98 °F (36.7 °C) (07/08/22 1209)  Pulse: 110 (07/08/22 1209)  Resp: 18 (07/08/22 1209)  BP: (!) 166/72 (07/08/22 1209)  SpO2: 96 % (07/08/22 1209)   Vital Signs (24h Range):  Temp:  [98 °F (36.7 °C)-102.5 °F (39.2 °C)] 98 °F (36.7 °C)  Pulse:  [104-114] 110  Resp:  [16-20] 18  SpO2:  [93 %-98 %] 96 %  BP: (143-198)/(63-84) 166/72     Weight: 82.6 kg (182 lb 1.6 oz)  Body mass index is 29.39 kg/m².    Estimated Creatinine Clearance: 57.5 mL/min (based on SCr of 1 mg/dL).    Physical Exam  Constitutional:       General: She is not in acute distress.     Appearance: She is well-developed. She is not ill-appearing or toxic-appearing.   HENT:      Head: Normocephalic and atraumatic.   Neck:      Vascular: No JVD.   Cardiovascular:      Rate and Rhythm: Tachycardia present.   Pulmonary:      Effort: Pulmonary effort is normal. No respiratory distress.   Abdominal:      Tenderness: Tenderness: diffuse.   Musculoskeletal:         General: Normal range of motion.      Cervical back: Normal range of motion and neck supple.   Neurological:      General: No focal deficit present.      Mental Status: She is alert and oriented to person, place, and time. Mental status is at baseline.      Cranial Nerves: No cranial nerve deficit.   Psychiatric:         Mood and Affect: Mood normal.         Behavior: Behavior normal.       Significant Labs: All pertinent labs within the past 24 hours have been reviewed.    Significant Imaging: I have reviewed all pertinent imaging results/findings within the past 24 hours.

## 2022-07-08 NOTE — SUBJECTIVE & OBJECTIVE
Interval History: No events    Review of Systems   Constitutional:  Positive for activity change. Negative for appetite change, chills, diaphoresis, fatigue and fever.   HENT:  Negative for congestion, postnasal drip, rhinorrhea, sinus pressure, sinus pain and sneezing.    Respiratory:  Negative for cough, chest tightness, shortness of breath, wheezing and stridor.    Cardiovascular:  Negative for chest pain, palpitations and leg swelling.   Gastrointestinal:  Negative for abdominal distention, abdominal pain, blood in stool, constipation, diarrhea, nausea and vomiting.   Endocrine: Negative for cold intolerance and heat intolerance.   Genitourinary:  Positive for dysuria and frequency. Negative for flank pain, hematuria and urgency.   Musculoskeletal:  Negative for gait problem.   Neurological:  Negative for dizziness and weakness.   Psychiatric/Behavioral:  Negative for agitation and behavioral problems.    All other systems reviewed and are negative.  Objective:     Vital Signs (Most Recent):  Temp: 98 °F (36.7 °C) (07/08/22 1209)  Pulse: 110 (07/08/22 1209)  Resp: 18 (07/08/22 1209)  BP: (!) 166/72 (07/08/22 1209)  SpO2: 96 % (07/08/22 1209)   Vital Signs (24h Range):  Temp:  [98 °F (36.7 °C)-102.5 °F (39.2 °C)] 98 °F (36.7 °C)  Pulse:  [104-114] 110  Resp:  [16-20] 18  SpO2:  [93 %-98 %] 96 %  BP: (143-198)/(63-84) 166/72     Weight: 82.6 kg (182 lb 1.6 oz)  Body mass index is 29.39 kg/m².    Estimated Creatinine Clearance: 57.5 mL/min (based on SCr of 1 mg/dL).    Physical Exam  Constitutional:       General: She is not in acute distress.     Appearance: She is well-developed. She is not ill-appearing or toxic-appearing.   HENT:      Head: Normocephalic and atraumatic.   Neck:      Vascular: No JVD.   Cardiovascular:      Rate and Rhythm: Tachycardia present.   Pulmonary:      Effort: Pulmonary effort is normal. No respiratory distress.   Abdominal:      Tenderness: Tenderness: diffuse.   Musculoskeletal:          General: Normal range of motion.      Cervical back: Normal range of motion and neck supple.   Neurological:      General: No focal deficit present.      Mental Status: She is alert and oriented to person, place, and time. Mental status is at baseline.      Cranial Nerves: No cranial nerve deficit.   Psychiatric:         Mood and Affect: Mood normal.         Behavior: Behavior normal.       Significant Labs: All pertinent labs within the past 24 hours have been reviewed.    Significant Imaging: I have reviewed all pertinent imaging results/findings within the past 24 hours.

## 2022-07-08 NOTE — ASSESSMENT & PLAN NOTE
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 coli UTI + bacteremia on cx from 7/2. On Day 3 of CTX, 7/4 blood cx NGTD. Patient afebrile initially but with intermittent low-grade fevers to 100.5 over past 48 hours. ID consulted for persistent fever despite abx.     - presence of large bilateral renal stones complicates picture, although non-obstructive  - bilateral DVT US is negative  - await repeat blood cx  - continue CTX 1g IV q 24H  - possible that fever is related to carcinoid

## 2022-07-08 NOTE — CONSULTS
[facility]  Adult Nutrition  Consult Note    SUMMARY     Recommendations    Recommendation:   1. Continue current renal diet as tolerated.   2. Addition of [employer] BID to promote wound healing.   3. Monitor weight/labs.   4. 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 Admitted with Sepsis and shearing to R buttock.     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 Pt receiving renal diet with 75% intake recorded. Pending SNF placement. PIV. Josh Score: 18 NFPE not completed 2/2 pt with other providers  Nutrition Discharge Planning: d/c on renal diet    Nutrition Risk Screen    Nutrition Risk Screen: no indicators present    Nutrition/Diet History    Food Preferences: ADOFLO  Spiritual, Cultural Beliefs, Buddhist Practices, Values that Affect Care: no  Food Allergies: NKFA  Factors Affecting Nutritional Intake: None identified at this time    Anthropometrics    Temp: 98 °F  "(36.7 °C)  Height Method: Stated  Height: 5' 6" (167.6 cm)  Height (inches): 66 in  Weight Method: Bed Scale  Weight: 82.6 kg (182 lb 1.6 oz)  Weight (lb): 182.1 lb  Ideal Body Weight (IBW), Female: 130 lb  % Ideal Body Weight, Female (lb): 140.08 %  BMI (Calculated): 29.4  BMI Grade: 25 - 29.9 - overweight       Lab/Procedures/Meds    Pertinent Labs Reviewed: reviewed  Pertinent Labs Comments: K 3.3, CO2 20, eGFR 58, Ca 8.3, Mg 1.4, A1C 6.6 (3/28/22)  Pertinent Medications Reviewed: reviewed  Pertinent Medications Comments: amlodipine, rocephin, cyanocobalamin, gabapentin, heparin, TOPROL-XL, pantoprazole, senna docusate      Estimated/Assessed Needs    Weight Used For Calorie Calculations: 82.6 kg (182 lb 1.6 oz)  Energy Calorie Requirements (kcal): 1778  Energy Need Method: Day-St Jeor (x 1.3)  Protein Requirements: 66-83 (0.8-1.0 gm/kg)  Weight Used For Protein Calculations: 82.6 kg (182 lb 1.6 oz)     Estimated Fluid Requirement Method: RDA Method (or PER MD)  RDA Method (mL): 1778         Nutrition Prescription Ordered    Current Diet Order: Renal    Evaluation of Received Nutrient/Fluid Intake    I/O: 435.5/2400  Energy Calories Required: meeting needs  Protein Required: meeting needs  Fluid Required: meeting needs  Comments: LBM 7/8  Tolerance: tolerating  % Intake of Estimated Energy Needs: 75 - 100 %  % Meal Intake: 75 - 100 %    Nutrition Risk    Level of Risk/Frequency of Follow-up:  (2x/week)       Monitor and Evaluation    Food and Nutrient Intake: energy intake, food and beverage intake  Food and Nutrient Adminstration: diet order  Knowledge/Beliefs/Attitudes: food and nutrition knowledge/skill  Physical Activity and Function: nutrition-related ADLs and IADLs  Anthropometric Measurements: weight, weight change, body mass index  Biochemical Data, Medical Tests and Procedures: electrolyte and renal panel, gastrointestinal profile, glucose/endocrine profile, inflammatory profile, lipid profile "       Nutrition Follow-Up    RD Follow-up?: Yes

## 2022-07-08 NOTE — PLAN OF CARE
Recommendation:   1. Continue current renal diet as tolerated.   2. Addition of [employer] BID to promote wound healing.   3. Monitor weight/labs.   4. RD to follow and monitor intake    Goals:   Pt intake >/= 75% EEN/EPN by RD follow up    Nutrition Goal Status: new  Communication of RD Recs: other (comment) (POC)      Problem: Nutrition Impaired (Sepsis/Septic Shock)  Goal: Optimal Nutrition Intake  Outcome: Ongoing, Progressing     Problem: Oral Intake Inadequate (Acute Kidney Injury/Impairment)  Goal: Optimal Nutrition Intake  Outcome: Ongoing, Progressing     Problem: Oral Intake Altered (Oncology Care)  Goal: Optimal Oral Intake  Outcome: Ongoing, Progressing     Problem: Impaired Wound Healing  Goal: Optimal Wound Healing  Outcome: Ongoing, Progressing

## 2022-07-08 NOTE — PLAN OF CARE
Suzie called [facility] at [phone]. Suzie spoke with Leah and completed LOCET. Sw faxed PASRR and awaiting 142 from state.     Sw will continue to follow pt for d/c planning.     Future Appointments   Date Time Provider Department Center   7/15/2022  8:00 AM Amy Bright NP [facility]   9/1/2022  9:20 AM Ervin Parikh MD [facility] UROLOG[facility]   9/21/2022 10:40 AM DO TARAH Savage [facility]       07/08/22 1312   Post-Acute Status   Post-Acute Authorization Placement   Post-Acute Placement Status Pending state direction/certification   Discharge Plan   Discharge Plan A Skilled Nursing Facility

## 2022-07-08 NOTE — PROGRESS NOTES
RSW met with patient to discuss discharge and additional patient barriers to care. RSW added [facility] information to pt AVS to address previously identified barriers. Pt identified no additional social barriers to care.     The following were addressed during this visit:  -Complete follow-up with patient    MADHURI Matias

## 2022-07-08 NOTE — PT/OT/SLP EVAL
Physical Therapy Evaluation    Patient Name:  Patito Domingo   MRN:  [id number]    Recommendations:     Discharge Recommendations:  nursing facility, skilled   Discharge Equipment Recommendations:  (TBD)   Barriers to discharge: pt requires increased level of physical assistance    Assessment:     Patito Domingo is a 71 y.o. female admitted with a medical diagnosis of Sepsis.  She presents with the following impairments/functional limitations:  weakness, impaired endurance, impaired self care skills, impaired functional mobilty, gait instability, impaired balance, decreased lower extremity function, decreased safety awareness, pain, decreased ROM; will recommend SNF upon d/c; pt c/o constipation then had BM following enema while seated EOB; prior to mobilization, X-ray obtained after pt c/o significant R hip pain due to fall PTA and no fx found; pt requires modA sup to sit, modA sit to stand at RW, unable to take steps 2/2 pt having BM and c/o hip pain; recommend continued acute PT services to maximize functional independence; will cont with POC ..    Rehab Prognosis: Good and Fair; patient would benefit from acute skilled PT services to address these deficits and reach maximum level of function.    Recent Surgery: * No surgery found *      Plan:     During this hospitalization, patient to be seen 3 x/week to address the identified rehab impairments via gait training, therapeutic activities, therapeutic exercises, neuromuscular re-education and progress toward the following goals:    · Plan of Care Expires:  08/07/22    Subjective     Chief Complaint: R hip pain  Patient/Family Comments/goals: return to PLOF  Pain/Comfort:  Pain Rating 1: 9/10  Location - Side 1: Right  Location 1: hip  Pain Addressed 1: Reposition, Distraction, Cessation of Activity, Nurse notified (MD notified)  Pain Rating Post-Intervention 1:  (not rated; increased pain end of session after standing)    Patients cultural, spiritual,  Christianity conflicts given the current situation: no    Living Environment:  Pt lives alone, 1st floor apt, 0STE, tub/shower combo with TTB  Previous level of function: Pt reports Uday, has assistance 1x per week for bathing, pt reports also helps to clean home  Roles and Routines: Does not drive, uses transportation services  Equipment Used at Home:  walker, rolling, wheelchair, rollator, bedside commode, bath bench  Assistance upon Discharge: Limited    Objective:     Communicated with nurse prior to session.  Patient found HOB elevated with bed alarm, PureWick, peripheral IV, telemetry  upon PT entry to room.    General Precautions: Standard, fall   Orthopedic Precautions:N/A   Braces: N/A  Respiratory Status: Room air    Exams:  · Cognitive Exam:  Patient is oriented to Person, Place, Time, Situation and follows one and two step commands  · Gross Motor Coordination:  slow movement  · Postural Exam:  Patient presented with the following abnormalities:    · -       Rounded shoulders  · -       Forward head  · -       minimal forward trunk flexion  · Sensation:    · -       Intact  · BLE ROM: WFL AA/Passively for transfers  · BLE Strength: moderate weakness proximally through observation and assist needed during functional mobility  · Wound noted to R intergluteal cleft    Functional Mobility:  · Bed Mobility:     · Supine to Sit: moderate assistance  · Sit to Supine: moderate assistance  · Transfers:     · Sit to Stand:  moderate assistance with rolling walker   · Gait: unable to take steps at this time 2/2 having BM, fatigue and hip pain; pt requested to return to sitting    Therapeutic Activities and Exercises:   pt performed bed mobility as described above; pt about to stand but had BM while sitting at EOB; pt stood at RW for total A with hygiene and diaper management by OT while PT stabilizing RW, giving VCs/assist for maintaining standing and balance..    AM-PAC 6 CLICK MOBILITY  Total Score:10     Patient  left HOB elevated with all lines intact, call button in reach, bed alarm on and nurse notified.    GOALS:   Multidisciplinary Problems     Physical Therapy Goals        Problem: Physical Therapy    Goal Priority Disciplines Outcome Goal Variances Interventions   Physical Therapy Goal     PT, PT/OT Ongoing, Progressing     Description: Goals to be met by: 2022     Patient will increase functional independence with mobility by performin. Supine to sit with Modified [place]  2. Sit to supine with Modified [place]  3. Rolling with Modified [place].  4. Sit to stand transfer with Supervision with RW/rollator  5. Bed to chair transfer with Supervision using Rolling Walker/rollator  6. Gait  x 75 feet with Supervision using Rolling Walker/rollator.   7. Lower extremity exercise program x10 reps with supervision                     History:     Past Medical History:   Diagnosis Date    Allergy     Arthritis     Cataract     Chronic diastolic (congestive) heart failure     Colon cancer     Encounter for blood transfusion     History of ESBL E. 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)        Past Surgical History:   Procedure Laterality Date    ABDOMINAL SURGERY      CATARACT EXTRACTION Left 10/2017     SECTION      CHOLECYSTECTOMY      COLON SURGERY      cystoscope      CYSTOSCOPY W/ RETROGRADES Right 10/10/2019    Procedure: CYSTOSCOPY, WITH RETROGRADE PYELOGRAM;  Surgeon: Gen Isbell MD;  Location: [facility];  Service: Urology;  Laterality: Right;    ERCP N/A 2021    Procedure: ERCP (ENDOSCOPIC RETROGRADE CHOLANGIOPANCREATOGRAPHY);  Surgeon: Jayjay Rivera MD;  Location: [facility] ENDO ([address] [place] [address]);  Service:  Endoscopy;  Laterality: N/A;    ERCP N/A 3/4/2022    Procedure: ERCP (ENDOSCOPIC RETROGRADE CHOLANGIOPANCREATOGRAPHY);  Surgeon: Roby Virgen MD;  Location: [address];  Service: Endoscopy;  Laterality: N/A;    EYE SURGERY      HYSTERECTOMY  5/1996    LITHOTRIPSY      LIVER BIOPSY  9/14    carcinoid    URETEROSCOPY Right 10/10/2019    Procedure: URETEROSCOPY;  Surgeon: Gen Isbell MD;  Location: [facility];  Service: Urology;  Laterality: Right;    UTERINE FIBROID SURGERY         Time Tracking:     PT Received On: 07/07/22  PT Start Time: 1351 (0941)     PT Stop Time: 1416 (1162)  PT Total Time (min): 25 min +10=35 with OT    Billable Minutes: Evaluation 10 and Therapeutic Activity 14      07/07/2022

## 2022-07-08 NOTE — HOSPITAL COURSE
Patito Domingo was monitored closely during her hospitalization.  She was admitted with a diagnosis of sepsis 2/2 UTI, FLORECITA and non-traumatic rhabdomyolysis.  Urine and blood cultures were obtained and she was placed on IV meropenem.  Her admitting troponin was elevated and cardiology was consulted.  An echocardiogram was obtained revealing normal ejection fraction with normal wall motion.  Her troponin trended down.  She was given gentle IVF and her FLORECITA as well as rhabdomyolysis improved.  Her blood culture was positive for GNR, and repeat cultures were drawn.  Urine culture was positive for ESCHERICHIA COLI, sensitive to Rocephin and abx were de-escalated from meropenem to IV rocephin.  PT/OT were consulted.  Pt c/o right hip pain so an xray was obtained which was negative for fracture.  She c/o swelling to RLE.  An ultrasound of the BLE was performed which showed no evidence of DVT.  She developed intermittent low grade fevers despite abx therapy and ID was consulted.  Per ID, presence of large bilateral renal stones complicates picture, although non-obstructive.  Previously assessed by urology, but decided against stone retrieval due to risks of percutaneous nephrolithotomy, and stones not clearly causing UTIs.  ID recommendations are for IV rocephin 1gm q24h x 2 weeks, start date 7/2/22.  Plan DC to SNF with PICC or midline for continued abx treatment.   to arrange.  Pt accepted to [facility] and discharge there for rehab and completion of IV antibiotics.

## 2022-07-08 NOTE — NURSING
Assumed care of patient from PANCHITO [place] at 0100, found patient to be resting in bed, vitals WNL, room air, c/o of pain in the right hip and BLE, gave patient heating pads to tide her over until next pain meds at 0545am, patient is AAOX4, wearing a purewick and watching TV, all safety precautions are in place, call bell and tray table are within reach of the patient and will continue to monitor.

## 2022-07-08 NOTE — PLAN OF CARE
Problem: Physical Therapy  Goal: Physical Therapy Goal  Description: Goals to be met by: 2022     Patient will increase functional independence with mobility by performin. Supine to sit with Modified [place]  2. Sit to supine with Modified [place]  3. Rolling with Modified [place].  4. Sit to stand transfer with Supervision with RW/rollator  5. Bed to chair transfer with Supervision using Rolling Walker/rollator  6. Gait  x 75 feet with Supervision using Rolling Walker/rollator.   7. Lower extremity exercise program x10 reps with supervision    Outcome: Ongoing, Progressing   Patient evaluated; full report to follow; will recommend SNF upon d/c; pt c/o constipation then had BM following enema while seated EOB; prior to mobilization, X-ray obtained after pt c/o significant R hip pain due to fall PTA and no fx found; pt requires modA sup to sit, modA sit to stand at RW, unable to take steps 2/2 pt having BM and c/o hip pain; recommend continued acute PT services to maximize functional independence; will cont with POC .

## 2022-07-08 NOTE — ASSESSMENT & PLAN NOTE
Urine culture from 5/15/22 growing ESBL Klebsiella  Meropenem  Urine culture growing GNR, e coli  Febrile overnight.  Continue IV abx and monitor  Continued fevers. ID consult

## 2022-07-08 NOTE — ASSESSMENT & PLAN NOTE
CK 1842  FLORECITA possibly a complication of rhabdo, although CK not significantly elevated  Received fluid bolus due to sepsis  Hold further fluid due to HF  Trend CK-slowly decreasing  Gentle fluids overnight and reassess in am  Continued improvement

## 2022-07-08 NOTE — PROGRESS NOTES
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 She has also noticed abd pain, n/v, increased urinary frequency/urgency and fell at home last night but denies LOC. Pt denies chest pain, sob, palpitations, diarrhea, constipation. Was prescribed keflex yesterday for UTI. Pt noted to be very somnolent and minimally conversant upon arrival to ED. Workup significant for +UA, leukocytosis, lactic acidosis, FLORECITA, elevated troponin. CT abd/pelvis with no acute findings; unchanged size of carcinoid tumor with liver mets and lymphadenopathy. Started on meropenem for UTI due to recent urine culture growing ESBL klebsiella. Mental status has since improved and patient now alert and oriented x4. Patient will be admitted to hospital medicine service for further management.       Overview/Hospital Course:  Patito Domingo was monitored closely during her hospitalization.  She was admitted with a diagnosis of sepsis 2/2 UTI, FLORECITA and non-traumatic rhabdomyolysis.  Urine and blood cultures were obtained and she was placed on IV meropenem.  Her admitting troponin was elevated and cardiology was consulted.  An echocardiogram was obtained revealing normal ejection fraction with normal wall motion.  Her troponin trended down.  She was given gentle IVF and her FLORECITA as well as rhabdomyolysis improved.  Her blood culture was positive for GNR, and repeat cultures were drawn.  Urine culture was positive for ESCHERICHIA COLI, sensitive to Rocephin and abx were  de-escalated from meropenem to IV rocephin.  PT/OT were consulted.  Pt c/o right hip pain so an xray was obtained which was negative for fracture.  She developed intermittent low grade fevers despite abx therapy and ID was consulted.        Interval History: pt febrile again yesterday afternoon, > 102F.  Blood cultures collected.  Has been hypertensive.  BP meds adjusted.    Review of Systems   Constitutional:  Positive for activity change. Negative for appetite change, chills, diaphoresis, fatigue and fever.   HENT:  Negative for congestion, postnasal drip, rhinorrhea, sinus pressure, sinus pain and sneezing.    Respiratory:  Negative for cough, chest tightness, shortness of breath, wheezing and stridor.    Cardiovascular:  Negative for chest pain, palpitations and leg swelling.   Gastrointestinal:  Negative for abdominal distention, abdominal pain, blood in stool, constipation, diarrhea, nausea and vomiting.   Endocrine: Negative for cold intolerance and heat intolerance.   Genitourinary:  Positive for dysuria and frequency. Negative for flank pain, hematuria and urgency.   Musculoskeletal:  Negative for gait problem.   Neurological:  Negative for dizziness and weakness.   Psychiatric/Behavioral:  Negative for agitation and behavioral problems.    All other systems reviewed and are negative.  Objective:     Vital Signs (Most Recent):  Temp: 98 °F (36.7 °C) (07/08/22 1209)  Pulse: 110 (07/08/22 1209)  Resp: 18 (07/08/22 1209)  BP: (!) 166/72 (07/08/22 1209)  SpO2: 96 % (07/08/22 1209)   Vital Signs (24h Range):  Temp:  [98 °F (36.7 °C)-102.5 °F (39.2 °C)] 98 °F (36.7 °C)  Pulse:  [104-114] 110  Resp:  [16-20] 18  SpO2:  [93 %-98 %] 96 %  BP: (143-198)/(63-84) 166/72     Weight: 82.6 kg (182 lb 1.6 oz)  Body mass index is 29.39 kg/m².    Intake/Output Summary (Last 24 hours) at 7/8/2022 1229  Last data filed at 7/8/2022 0400  Gross per 24 hour   Intake 435.53 ml   Output 2400 ml   Net -1964.47 ml      Physical  Exam  Constitutional:       General: She is not in acute distress.     Appearance: She is well-developed. She is not ill-appearing or toxic-appearing.   HENT:      Head: Normocephalic and atraumatic.   Neck:      Vascular: No JVD.   Cardiovascular:      Rate and Rhythm: Tachycardia present.   Pulmonary:      Effort: Pulmonary effort is normal. No respiratory distress.   Abdominal:      Tenderness: Tenderness: diffuse.   Musculoskeletal:         General: Normal range of motion.      Cervical back: Normal range of motion and neck supple.   Neurological:      General: No focal deficit present.      Mental Status: She is alert and oriented to person, place, and time. Mental status is at baseline.      Cranial Nerves: No cranial nerve deficit.   Psychiatric:         Mood and Affect: Mood normal.         Behavior: Behavior normal.       Significant Labs: All pertinent labs within the past 24 hours have been reviewed.  BMP:   Recent Labs   Lab 07/08/22  0605         K 3.3*      CO2 20*   BUN 22   CREATININE 1.0   CALCIUM 8.3*   MG 1.4*     CBC:   Recent Labs   Lab 07/08/22  0605   WBC 4.85   HGB 8.8*   HCT 29.1*          Significant Imaging: I have reviewed all pertinent imaging results/findings within the past 24 hours.      Assessment/Plan:      * Sepsis  This patient does have evidence of infective focus  My overall impression is sepsis. Vital signs were reviewed and noted in progress note.  Antibiotics given-   Antibiotics (From admission, onward)            Start     Stop Route Frequency Ordered    07/07/22 1000  cefTRIAXone (ROCEPHIN) 1 g/50 mL D5W IVPB         -- IV Every 24 hours (non-standard times) 07/07/22 0852        Cultures were taken-   Microbiology Results (last 7 days)     Procedure Component Value Units Date/Time    Blood culture [[id number]]     Order Status: Sent Specimen: Blood     Blood culture [[id number]] Collected: 07/07/22 1574    Order Status: Sent Specimen: Blood Updated:  07/07/22 1734    Narrative:      Collection has been rescheduled by [email or web address] at 07/07/2022 15:04 Reason:   Unable to collect both sets of cultures  Collection has been rescheduled by [email or web address] at 07/07/2022 15:04 Reason:   Unable to collect both sets of cultures    Blood culture [[id number]] Collected: 07/07/22 1502    Order Status: Sent Specimen: Blood Updated: 07/07/22 1502    Blood culture [[id number]] Collected: 07/04/22 1008    Order Status: Completed Specimen: Blood Updated: 07/07/22 1422     Blood Culture, Routine No Growth to date      No Growth to date      No Growth to date      No Growth to date    Blood culture [[id number]] Collected: 07/04/22 1008    Order Status: Completed Specimen: Blood Updated: 07/07/22 1422     Blood Culture, Routine No Growth to date      No Growth to date      No Growth to date      No Growth to date    Blood culture #1 **CANNOT BE ORDERED STAT** [[id number]] Collected: 07/02/22 2213    Order Status: Completed Specimen: Blood Updated: 07/07/22 1212     Blood Culture, Routine No Growth to date      No Growth to date      No Growth to date      No Growth to date      No Growth to date    Blood culture #2 **CANNOT BE ORDERED STAT** [[id number]]  (Abnormal)  (Susceptibility) Collected: 07/02/22 2213    Order Status: Completed Specimen: Blood Updated: 07/06/22 1011     Blood Culture, Routine Gram stain aer bottle: Gram negative rods       Results called to and read back by: Renee Lancaster RN  07/04/2022  01:29      ESCHERICHIA COLI    Urine culture [[id number]]  (Abnormal)  (Susceptibility) Collected: 07/02/22 1948    Order Status: Completed Specimen: Urine Updated: 07/05/22 0923     Urine Culture, Routine ESCHERICHIA COLI  >100,000 cfu/ml      Narrative:      Specimen Source->Urine        Latest lactate reviewed, they are-  No results for input(s): LACTATE in the last 72 hours.    Organ dysfunction indicated by Acute kidney injury and Encephalopathy   Source- Urine    Source control Achieved by-  Antibiotics for UTI coverage.     Continue meropenem given history of ESBL  Given 2L bolus in ER; will continue with NS at 100 ml/hr given CHF history  Repeat LA and troponin   Resolved          Bacteremia  Blood culture 1 tube growing GNR  Possible seeding from bladder  Cont meropenem  Repeat blood cultures-NGTD      Non-traumatic rhabdomyolysis  CK 1842  FLORECITA possibly a complication of rhabdo, although CK not significantly elevated  Received fluid bolus due to sepsis  Hold further fluid due to HF  Trend CK-slowly decreasing  Gentle fluids overnight and reassess in am  Continued improvement      Lactic acidosis  Due to sepsis  Improving      NSTEMI (non-ST elevated myocardial infarction)  Trop trending down. No EKG changes or c/o chest pain  TTE reviewed.  Cardiology consulted        Chronic diastolic (congestive) heart failure  No signs of decompensation  Cont metoprolol  TTE showing grade I diastolic dysfunction  Monitor I&O         FLORECITA (acute kidney injury)  Lab Results   Component Value Date    CREATININE 1.0 07/07/2022     Sr Cr elevated secondary to sepsis  Cont to monitor with daily labs   Avoid nephrotoxins.   Renally dose all medications   Monitor events that may lead to decreased renal perfusion (hypovolemia, hypotension, sepsis).   Monitor urine output (goal 0.5 mL/kg/hr) to assure that no obstruction precipitates worsening in GFR.  Serum bicarb level 19. Anticipate improvement with sepsis management.   FLORECITA possibly due to sepsis vs rhabdo  Gentle IVFH-improving  resolved      HLD (hyperlipidemia)  Continue atorvastatin     Essential hypertension  Chronic, uncontrolled.  Latest blood pressure and vitals reviewed-   Temp:  [98 °F (36.7 °C)-102.5 °F (39.2 °C)]   Pulse:  [104-114]   Resp:  [16-20]   BP: (143-198)/(63-84)   SpO2:  [93 %-98 %] .   Home meds for hypertension were reviewed and noted below.   Hypertension Medications             losartan (COZAAR) 100 MG tablet TAKE 1 TABLET (100 MG TOTAL) BY  MOUTH ONCE DAILY.    metoprolol succinate (TOPROL-XL) 25 MG 24 hr tablet Take 0.5 tablets (12.5 mg total) by mouth once daily.          While in the hospital, will manage blood pressure as follows; Adjust home antihypertensive regimen as follows- losartan held due to FLORECITA.  FLORECITA better; however, amlodipine was started.  Increase to amlodipine 10mg daily.  Will increase beta blocker as she has been tachycardic as well.  Monitor closely and adjust as necessary.    Will utilize p.r.n. blood pressure medication only if patient's blood pressure greater than  180/110 and she develops symptoms such as worsening chest pain or shortness of breath.        Acute cystitis with hematuria  Urine culture from 5/15/22 growing ESBL Klebsiella  Meropenem  Urine culture growing GNR, e coli  Febrile overnight.  Continue IV abx and monitor  Continued fevers. ID consult   Now on IV Rocephin as per sensitivity      Metastatic carcinoid tumor  ER physician discussed the case with Dr. Perez with neuroendocrine services  Continue follow up outpatient as scheduled       VTE Risk Mitigation (From admission, onward)         Ordered     heparin (porcine) injection 5,000 Units  Every 8 hours         07/03/22 0058     IP VTE HIGH RISK PATIENT  Once         07/03/22 0058     Place sequential compression device  Until discontinued         07/03/22 0058                      I have assessed these finding virtually using telemed platform and with assistance of bedside nurse                 The attending portion of this evaluation, treatment, and documentation was performed per BERENICE Cook via Telemedicine AudioVisual using the secure [employer] software platform with 2 way audio/video. The provider was located off-site and the patient is located in the hospital. The aforementioned video software was utilized to document the relevant history and physical exam    BERENICE Cook  Department of Hospital Medicine   [place] [facility]

## 2022-07-08 NOTE — NURSING
Pt changed multiple times this am for loose bowels, am meds given, B/P remains high on recheck at  190/78 Dr. Calix notified.

## 2022-07-08 NOTE — SUBJECTIVE & OBJECTIVE
Interval History: pt febrile again yesterday afternoon, > 102F.  Blood cultures collected.  Has been hypertensive.  BP meds adjusted.    Review of Systems   Constitutional:  Positive for activity change. Negative for appetite change, chills, diaphoresis, fatigue and fever.   HENT:  Negative for congestion, postnasal drip, rhinorrhea, sinus pressure, sinus pain and sneezing.    Respiratory:  Negative for cough, chest tightness, shortness of breath, wheezing and stridor.    Cardiovascular:  Negative for chest pain, palpitations and leg swelling.   Gastrointestinal:  Negative for abdominal distention, abdominal pain, blood in stool, constipation, diarrhea, nausea and vomiting.   Endocrine: Negative for cold intolerance and heat intolerance.   Genitourinary:  Positive for dysuria and frequency. Negative for flank pain, hematuria and urgency.   Musculoskeletal:  Negative for gait problem.   Neurological:  Negative for dizziness and weakness.   Psychiatric/Behavioral:  Negative for agitation and behavioral problems.    All other systems reviewed and are negative.  Objective:     Vital Signs (Most Recent):  Temp: 98 °F (36.7 °C) (07/08/22 1209)  Pulse: 110 (07/08/22 1209)  Resp: 18 (07/08/22 1209)  BP: (!) 166/72 (07/08/22 1209)  SpO2: 96 % (07/08/22 1209)   Vital Signs (24h Range):  Temp:  [98 °F (36.7 °C)-102.5 °F (39.2 °C)] 98 °F (36.7 °C)  Pulse:  [104-114] 110  Resp:  [16-20] 18  SpO2:  [93 %-98 %] 96 %  BP: (143-198)/(63-84) 166/72     Weight: 82.6 kg (182 lb 1.6 oz)  Body mass index is 29.39 kg/m².    Intake/Output Summary (Last 24 hours) at 7/8/2022 1229  Last data filed at 7/8/2022 0400  Gross per 24 hour   Intake 435.53 ml   Output 2400 ml   Net -1964.47 ml      Physical Exam  Constitutional:       General: She is not in acute distress.     Appearance: She is well-developed. She is not ill-appearing or toxic-appearing.   HENT:      Head: Normocephalic and atraumatic.   Neck:      Vascular: No JVD.    Cardiovascular:      Rate and Rhythm: Tachycardia present.   Pulmonary:      Effort: Pulmonary effort is normal. No respiratory distress.   Abdominal:      Tenderness: Tenderness: diffuse.   Musculoskeletal:         General: Normal range of motion.      Cervical back: Normal range of motion and neck supple.   Neurological:      General: No focal deficit present.      Mental Status: She is alert and oriented to person, place, and time. Mental status is at baseline.      Cranial Nerves: No cranial nerve deficit.   Psychiatric:         Mood and Affect: Mood normal.         Behavior: Behavior normal.       Significant Labs: All pertinent labs within the past 24 hours have been reviewed.  BMP:   Recent Labs   Lab 07/08/22  0605         K 3.3*      CO2 20*   BUN 22   CREATININE 1.0   CALCIUM 8.3*   MG 1.4*     CBC:   Recent Labs   Lab 07/08/22  0605   WBC 4.85   HGB 8.8*   HCT 29.1*          Significant Imaging: I have reviewed all pertinent imaging results/findings within the past 24 hours.

## 2022-07-08 NOTE — PROGRESS NOTES
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 She has also noticed abd pain, n/v, increased urinary frequency/urgency and fell at home last night but denies LOC. Pt denies chest pain, sob, palpitations, diarrhea, constipation. Was prescribed keflex yesterday for UTI. Pt noted to be very somnolent and minimally conversant upon arrival to ED. Workup significant for +UA, leukocytosis, lactic acidosis, FLORECITA, elevated troponin. CT abd/pelvis with no acute findings; unchanged size of carcinoid tumor with liver mets and lymphadenopathy. Started on meropenem for UTI due to recent urine culture growing ESBL klebsiella. Mental status has since improved and patient now alert and oriented x4. Patient will be admitted to hospital medicine service for further management.       Overview/Hospital Course:  Patito Domingo was monitored closely during her hospitalization.  She was admitted with a diagnosis of sepsis 2/2 UTI, FLORECITA and non-traumatic rhabdomyolysis.  Urine and blood cultures were obtained and she was placed on IV meropenem.  Her admitting troponin was elevated and cardiology was consulted.  An echocardiogram was obtained revealing normal ejection fraction with normal wall motion.  Her troponin trended down.  She was given gentle IVF and her FLORECITA as well as rhabdomyolysis improved.  Her blood culture was positive for GNR, and repeat cultures were drawn.  Urine culture was positive for ESCHERICHIA COLI, sensitive to Rocephin and abx were  "de-escalated from meropenem to IV rocephin.  PT/OT were consulted.  Pt c/o right hip pain so an xray was obtained which was negative for fracture.  She developed intermittent low grade fevers despite abx therapy and ID was consulted.        Interval History: See "hospital course"      Review of Systems   Constitutional:  Positive for activity change, fatigue and fever. Negative for appetite change, chills and diaphoresis.   HENT:  Negative for congestion, postnasal drip, rhinorrhea, sinus pressure, sinus pain and sneezing.    Respiratory:  Negative for cough, chest tightness, shortness of breath, wheezing and stridor.    Cardiovascular:  Negative for chest pain, palpitations and leg swelling.   Gastrointestinal:  Negative for abdominal distention, abdominal pain, blood in stool, constipation, diarrhea, nausea and vomiting.   Endocrine: Negative for cold intolerance and heat intolerance.   Genitourinary:  Positive for dysuria, frequency and urgency. Negative for flank pain and hematuria.   Musculoskeletal:  Negative for gait problem.   Neurological:  Positive for weakness. Negative for dizziness.   Psychiatric/Behavioral:  Negative for agitation and behavioral problems.    Objective:     Vital Signs (Most Recent):  Temp: 99.1 °F (37.3 °C) (07/07/22 2201)  Pulse: 106 (07/07/22 2141)  Resp: 18 (07/07/22 1918)  BP: (!) 143/63 (07/07/22 1918)  SpO2: 98 % (07/07/22 1918)   Vital Signs (24h Range):  Temp:  [98.8 °F (37.1 °C)-102.5 °F (39.2 °C)] 99.1 °F (37.3 °C)  Pulse:  [] 106  Resp:  [15-18] 18  SpO2:  [96 %-99 %] 98 %  BP: (143-190)/(63-77) 143/63     Weight: 86.9 kg (191 lb 9.3 oz)  Body mass index is 30.92 kg/m².    Intake/Output Summary (Last 24 hours) at 7/7/2022 2237  Last data filed at 7/7/2022 1900  Gross per 24 hour   Intake 584.77 ml   Output 2500 ml   Net -1915.23 ml      Physical Exam  Constitutional:       General: She is not in acute distress.     Appearance: She is well-developed. She is not " ill-appearing or toxic-appearing.   HENT:      Head: Normocephalic and atraumatic.   Neck:      Vascular: No JVD.   Cardiovascular:      Rate and Rhythm: Tachycardia present.   Pulmonary:      Effort: Pulmonary effort is normal. No respiratory distress.   Abdominal:      Tenderness: Tenderness: diffuse.   Musculoskeletal:         General: Normal range of motion.      Cervical back: Normal range of motion and neck supple.   Neurological:      General: No focal deficit present.      Mental Status: She is alert and oriented to person, place, and time. Mental status is at baseline.      Cranial Nerves: No cranial nerve deficit.   Psychiatric:         Mood and Affect: Mood normal.         Behavior: Behavior normal.       Significant Labs: All pertinent labs within the past 24 hours have been reviewed.  Blood Culture: No results for input(s): LABBLOO in the last 48 hours.  BMP:   Recent Labs   Lab 07/06/22  0516 07/07/22  0510   * 115*   * 146*   K 3.9 3.5   * 111*   CO2 26 28   BUN 37* 27*   CREATININE 1.5* 1.0   CALCIUM 8.2* 8.2*   MG 1.7  --      CBC:   Recent Labs   Lab 07/06/22  0516   WBC 4.42   HGB 8.6*   HCT 27.6*   *     Urine Culture: No results for input(s): LABURIN in the last 48 hours.    Significant Imaging: I have reviewed all pertinent imaging results/findings within the past 24 hours.      Assessment/Plan:      * Sepsis  This patient does have evidence of infective focus  My overall impression is sepsis. Vital signs were reviewed and noted in progress note.  Antibiotics given-   Antibiotics (From admission, onward)            Start     Stop Route Frequency Ordered    07/07/22 1000  cefTRIAXone (ROCEPHIN) 1 g/50 mL D5W IVPB         -- IV Every 24 hours (non-standard times) 07/07/22 0852        Cultures were taken-   Microbiology Results (last 7 days)     Procedure Component Value Units Date/Time    Blood culture [[id number]]     Order Status: Sent Specimen: Blood     Blood culture  [[id number]] Collected: 07/07/22 1734    Order Status: Sent Specimen: Blood Updated: 07/07/22 1734    Narrative:      Collection has been rescheduled by [email or web address] at 07/07/2022 15:04 Reason:   Unable to collect both sets of cultures  Collection has been rescheduled by [email or web address] at 07/07/2022 15:04 Reason:   Unable to collect both sets of cultures    Blood culture [[id number]] Collected: 07/07/22 1502    Order Status: Sent Specimen: Blood Updated: 07/07/22 1502    Blood culture [[id number]] Collected: 07/04/22 1008    Order Status: Completed Specimen: Blood Updated: 07/07/22 1422     Blood Culture, Routine No Growth to date      No Growth to date      No Growth to date      No Growth to date    Blood culture [[id number]] Collected: 07/04/22 1008    Order Status: Completed Specimen: Blood Updated: 07/07/22 1422     Blood Culture, Routine No Growth to date      No Growth to date      No Growth to date      No Growth to date    Blood culture #1 **CANNOT BE ORDERED STAT** [[id number]] Collected: 07/02/22 2213    Order Status: Completed Specimen: Blood Updated: 07/07/22 1212     Blood Culture, Routine No Growth to date      No Growth to date      No Growth to date      No Growth to date      No Growth to date    Blood culture #2 **CANNOT BE ORDERED STAT** [[id number]]  (Abnormal)  (Susceptibility) Collected: 07/02/22 2213    Order Status: Completed Specimen: Blood Updated: 07/06/22 1011     Blood Culture, Routine Gram stain aer bottle: Gram negative rods       Results called to and read back by: Renee Lancaster RN  07/04/2022  01:29      ESCHERICHIA COLI    Urine culture [[id number]]  (Abnormal)  (Susceptibility) Collected: 07/02/22 1948    Order Status: Completed Specimen: Urine Updated: 07/05/22 0923     Urine Culture, Routine ESCHERICHIA COLI  >100,000 cfu/ml      Narrative:      Specimen Source->Urine        Latest lactate reviewed, they are-  No results for input(s): LACTATE in the last 72 hours.    Organ dysfunction indicated by Acute  kidney injury and Encephalopathy   Source- Urine    Source control Achieved by- Antibiotics for UTI coverage.     Continue meropenem given history of ESBL  Given 2L bolus in ER; will continue with NS at 100 ml/hr given CHF history  Repeat LA and troponin   Resolved          Bacteremia  Blood culture 1 tube growing GNR  Possible seeding from bladder  Cont meropenem  Repeat blood cultures-NGTD      Non-traumatic rhabdomyolysis  CK 1842  FLORECITA possibly a complication of rhabdo, although CK not significantly elevated  Received fluid bolus due to sepsis  Hold further fluid due to HF  Trend CK-slowly decreasing  Gentle fluids overnight and reassess in am  Continued improvement      Lactic acidosis  Due to sepsis  Improving      NSTEMI (non-ST elevated myocardial infarction)  Trop trending down. No EKG changes or c/o chest pain  TTE reviewed.  Cardiology consulted        Chronic diastolic (congestive) heart failure  No signs of decompensation  Cont metoprolol  TTE showing grade I diastolic dysfunction  Monitor I&O         FLORECITA (acute kidney injury)  Lab Results   Component Value Date    CREATININE 1.0 07/07/2022     Sr Cr elevated secondary to sepsis  Cont to monitor with daily labs   Avoid nephrotoxins.   Renally dose all medications   Monitor events that may lead to decreased renal perfusion (hypovolemia, hypotension, sepsis).   Monitor urine output (goal 0.5 mL/kg/hr) to assure that no obstruction precipitates worsening in GFR.  Serum bicarb level 19. Anticipate improvement with sepsis management.   FLORECITA possibly due to sepsis vs rhabdo  Gentle IVFH-improving  resolved      HLD (hyperlipidemia)  Continue atorvastatin     Essential hypertension  Hold losartan due to FLORECITA  Continue metoprolol   Norvasc added      Acute cystitis with hematuria  Urine culture from 5/15/22 growing ESBL Klebsiella  Meropenem  Urine culture growing GNR, e coli  Febrile overnight.  Continue IV abx and monitor  Continued fevers. ID  consult      Metastatic carcinoid tumor  ER physician discussed the case with Dr. Perez with neuroendocrine services  Continue follow up outpatient as scheduled       VTE Risk Mitigation (From admission, onward)         Ordered     heparin (porcine) injection 5,000 Units  Every 8 hours         07/03/22 0058     IP VTE HIGH RISK PATIENT  Once         07/03/22 0058     Place sequential compression device  Until discontinued         07/03/22 0058                      I have assessed these finding virtually using telemed platform and with assistance of bedside nurse                 The attending portion of this evaluation, treatment, and documentation was performed per EBRENICE Cook via Telemedicine AudioVisual using the secure [employer] software platform with 2 way audio/video. The provider was located off-site and the patient is located in the hospital. The aforementioned video software was utilized to document the relevant history and physical exam    BERENICE Cook  Department of Hospital Medicine   [facility]

## 2022-07-08 NOTE — PLAN OF CARE
Problem: Adult Inpatient Plan of Care  Goal: Plan of Care Review  Outcome: Ongoing, Progressing  Goal: Patient-Specific Goal (Individualized)  Outcome: Ongoing, Progressing  Goal: Absence of Hospital-Acquired Illness or Injury  Outcome: Ongoing, Progressing  Goal: Optimal Comfort and Wellbeing  Outcome: Ongoing, Progressing  Goal: Readiness for Transition of Care  Outcome: Ongoing, Progressing     Problem: Adjustment to Illness (Sepsis/Septic Shock)  Goal: Optimal Coping  Outcome: Ongoing, Progressing     Problem: Bleeding (Sepsis/Septic Shock)  Goal: Absence of Bleeding  Outcome: Ongoing, Progressing     Problem: Glycemic Control Impaired (Sepsis/Septic Shock)  Goal: Blood Glucose Level Within Desired Range  Outcome: Ongoing, Progressing     Problem: Infection Progression (Sepsis/Septic Shock)  Goal: Absence of Infection Signs and Symptoms  Outcome: Ongoing, Progressing     Problem: Nutrition Impaired (Sepsis/Septic Shock)  Goal: Optimal Nutrition Intake  Outcome: Ongoing, Progressing     Problem: Fluid and Electrolyte Imbalance (Acute Kidney Injury/Impairment)  Goal: Fluid and Electrolyte Balance  Outcome: Ongoing, Progressing     Problem: Oral Intake Inadequate (Acute Kidney Injury/Impairment)  Goal: Optimal Nutrition Intake  Outcome: Ongoing, Progressing     Problem: Renal Function Impairment (Acute Kidney Injury/Impairment)  Goal: Effective Renal Function  Outcome: Ongoing, Progressing     Problem: Fall Injury Risk  Goal: Absence of Fall and Fall-Related Injury  Outcome: Ongoing, Progressing     Problem: Skin Injury Risk Increased  Goal: Skin Health and Integrity  Outcome: Ongoing, Progressing     Problem: Coping Ineffective (Oncology Care)  Goal: Effective Coping  Outcome: Ongoing, Progressing     Problem: Fatigue (Oncology Care)  Goal: Improved Activity Tolerance  Outcome: Ongoing, Progressing     Problem: Oral Intake Altered (Oncology Care)  Goal: Optimal Oral Intake  Outcome: Ongoing, Progressing      Problem: Pain Acute (Oncology Care)  Goal: Optimal Pain Control  Outcome: Ongoing, Progressing     Problem: UTI (Urinary Tract Infection)  Goal: Improved Infection Symptoms  Outcome: Ongoing, Progressing     Problem: Heart Failure Comorbidity  Goal: Maintenance of Heart Failure Symptom Control  Outcome: Ongoing, Progressing     Problem: Hypertension Comorbidity  Goal: Blood Pressure in Desired Range  Outcome: Ongoing, Progressing     Problem: Muscle Strength Impairment (Functional Deficit)  Goal: Improved Muscle Strength  Outcome: Ongoing, Progressing     Problem: Muscle Tone Impairment (Functional Deficit)  Goal: Improved Muscle Tone  Outcome: Ongoing, Progressing     Problem: Range of Motion Impairment (Functional Deficit)  Goal: Optimal Range of Motion  Outcome: Ongoing, Progressing     Problem: Fatigue  Goal: Improved Activity Tolerance  Outcome: Ongoing, Progressing     Problem: Pain Acute  Goal: Acceptable Pain Control and Functional Ability  Outcome: Ongoing, Progressing     Problem: Impaired Wound Healing  Goal: Optimal Wound Healing  Outcome: Ongoing, Progressing

## 2022-07-08 NOTE — ASSESSMENT & PLAN NOTE
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 Admitted with Sepsis and shearing to R buttock.     Interventions:  Collaboration with other providers  Modified Beverage- [facility] BID  [employer]- [employer] Renal BID    Nutrition Diagnosis Status:   Continues

## 2022-07-08 NOTE — ASSESSMENT & PLAN NOTE
Lab Results   Component Value Date    CREATININE 1.0 07/07/2022     Sr Cr elevated secondary to sepsis  Cont to monitor with daily labs   Avoid nephrotoxins.   Renally dose all medications   Monitor events that may lead to decreased renal perfusion (hypovolemia, hypotension, sepsis).   Monitor urine output (goal 0.5 mL/kg/hr) to assure that no obstruction precipitates worsening in GFR.  Serum bicarb level 19. Anticipate improvement with sepsis management.   FLORECITA possibly due to sepsis vs rhabdo  Gentle IVFH-improving  resolved

## 2022-07-08 NOTE — PLAN OF CARE
Sw met with pt to discuss d/c planning. Per therapy recommendation, Sw discussed with pt about the possibility of transferring to a SNF facility upon d/c. Pt stated she is open to transferring to a SNF facility upon d/c. Pt stated she would like to transfer to [facility] or a SNF facility in the [facility]. Sw sent SNF referral to [facility] and SNF facilities in the [facility]. Sw encouraged pt to call with any questions or concerns. Sw will continue to follow pt for d/c planning.     9:35 a.m Suzie spoke with Brandee with [facility]. Per Brandee, she will review pt for [facility]. Brandee did state that there is currently no open beds available but may have an open bed next week.     Future Appointments   Date Time Provider Department Center   7/15/2022  8:00 AM Amy Bright NP [facility] [place]   9/1/2022  9:20 AM Ervin Parikh MD [facility] UROLOGY [place] [facility]   9/21/2022 10:40 AM DO TARAH Savage [facility] [place]         07/08/22 0915   Post-Acute Status   Post-Acute Authorization Placement   Post-Acute Placement Status Referrals Sent   Coverage [employer] Managed Medicare   Discharge Plan   Discharge Plan A Skilled Nursing Facility

## 2022-07-08 NOTE — PLAN OF CARE
Problem: Occupational Therapy  Goal: Occupational Therapy Goal  Description: Goals to be met by: 8/7/2022     Patient will increase functional independence with ADLs by performing:    LE Dressing with Set-up Assistance.  Grooming while standing with Set-up Assistance.  Toileting from toilet with Supervision for hygiene and clothing management.   Supine to sit with Modified [place].  Step transfer with Supervision & appropriate AD.   Toilet transfer to toilet with Supervision & appropriate AD.     Outcome: Ongoing, Progressing   Patito TOBIAS Domingo is a 71 y.o. female with a medical diagnosis of Sepsis.  Performance deficits affecting function are weakness, impaired endurance, impaired self care skills, impaired functional mobilty, gait instability, impaired balance, decreased lower extremity function, decreased upper extremity function, decreased safety awareness, pain, decreased ROM, impaired coordination. Pt found in bed, agreeable to therapy despite pain in B hips, R>L, and low back pain.  Pt required Mod A with bed mobility and stood with CGA/Min A x 2 using RW.  Pt with limited standing tolerance 2/2 pain and fear of falling.  Pt requires encouragement throughout to increase effort during session. Continue OT services to address functional goals, progressing as able.

## 2022-07-08 NOTE — PT/OT/SLP PROGRESS
Occupational Therapy   Treatment    Name: Patito Domingo  MRN: [id number]  Admitting Diagnosis:  Sepsis       Recommendations:     Discharge Recommendations: nursing facility, skilled  Discharge Equipment Recommendations:  other (see comments) (defer to SNF)  Barriers to discharge:  Other (Comment), Decreased caregiver support (Increased level of assistance)    Assessment:     Patito Domingo is a 71 y.o. female with a medical diagnosis of Sepsis.  Performance deficits affecting function are weakness, impaired endurance, impaired self care skills, impaired functional mobilty, gait instability, impaired balance, decreased lower extremity function, decreased upper extremity function, decreased safety awareness, pain, decreased ROM, impaired coordination. Pt found in bed, agreeable to therapy despite pain in B hips, R>L, and low back pain.  Pt required Mod A with bed mobility and stood with CGA/Min A x 2 using RW.  Pt with limited standing tolerance 2/2 pain and fear of falling.  Pt requires encouragement throughout to increase effort during session. Continue OT services to address functional goals, progressing as able.      Rehab Prognosis:  Good; patient would benefit from acute skilled OT services to address these deficits and reach maximum level of function.       Plan:     Patient to be seen 3 x/week to address the above listed problems via self-care/home management, therapeutic activities, therapeutic exercises  · Plan of Care Expires: 08/07/22  · Plan of Care Reviewed with: patient    Subjective     Pain/Comfort:  · Pain Rating 1: 9/10  · Location - Side 1: Bilateral  · Location - Orientation 1: generalized  · Location 1: hip  · Pain Addressed 1: Distraction, Nurse notified  · Pain Rating Post-Intervention 1: 9/10  · Pain Rating 2: 9/10  · Location - Side 2: Bilateral  · Location - Orientation 2: lower  · Location 2: back  · Pain Addressed 2: Nurse notified  · Pain Rating Post-Intervention 2:  9/10    Objective:     Communicated with: RN prior to session.  Patient found HOB elevated with bed alarm, PureWick, peripheral IV, telemetry upon OT entry to room.    General Precautions: Standard, fall   Orthopedic Precautions:N/A   Braces: N/A  Respiratory Status: Room air     Occupational Performance:     Bed Mobility:    · Patient completed Rolling/Turning to Left with  moderate assistance and with side rail  · Patient completed Rolling/Turning to Right with moderate assistance and with side rail  · Patient completed Supine to Sit with moderate assistance, with side rail and HOB elevated, increased time and effort, vc's for effective technique  · Patient completed Sit to Supine with moderate assistance for BLE assist    Functional Mobility/Transfers:  · Patient completed Sit <> Stand Transfer with contact guard assistance 1st stand, and minimum assistance and of 2 persons 2nd-3rd trial with  rolling walker x 3 trials with flexed knees and trunk. Pt requires vcs for hand placement/effective tech and upright posture.  · Functional Mobility: Pt side stepped to R side with Min/Mod A x 2 using RW.  Encouragement to take steps, flexed trunk ans knees, increased pain.     Activities of Daily Living:  · Toileting: total assistance soiled diaper.  Pt reports constant diarrhea. RN aware      [facility] 6 Click ADL: 16    Treatment & Education:  Pt sat EOB with SBA.  She is able to scoot seated with SBA.   Pt declined UE/LE therex at EOB as well as in bed.  She also declined G/H secondary to pain in back.  RN notified.    Patient left HOB elevated with all lines intact, call button in reach, bed alarm on and RN presentEducation:      GOALS:   Multidisciplinary Problems     Occupational Therapy Goals        Problem: Occupational Therapy    Goal Priority Disciplines Outcome Interventions   Occupational Therapy Goal     OT, PT/OT Ongoing, Progressing    Description: Goals to be met by: 8/7/2022     Patient will increase  functional independence with ADLs by performing:    LE Dressing with Set-up Assistance.  Grooming while standing with Set-up Assistance.  Toileting from toilet with Supervision for hygiene and clothing management.   Supine to sit with Modified [place].  Step transfer with Supervision & appropriate AD.   Toilet transfer to toilet with Supervision & appropriate AD.                      Time Tracking:     OT Date of Treatment: 07/08/22  OT Start Time: 1058  OT Stop Time: 1123  OT Total Time (min): 25 min    Billable Minutes:Therapeutic Activity 12   *cotx with PT            7/8/2022

## 2022-07-08 NOTE — ASSESSMENT & PLAN NOTE
Chronic, uncontrolled.  Latest blood pressure and vitals reviewed-   Temp:  [98 °F (36.7 °C)-102.5 °F (39.2 °C)]   Pulse:  [104-114]   Resp:  [16-20]   BP: (143-198)/(63-84)   SpO2:  [93 %-98 %] .   Home meds for hypertension were reviewed and noted below.   Hypertension Medications             losartan (COZAAR) 100 MG tablet TAKE 1 TABLET (100 MG TOTAL) BY MOUTH ONCE DAILY.    metoprolol succinate (TOPROL-XL) 25 MG 24 hr tablet Take 0.5 tablets (12.5 mg total) by mouth once daily.          While in the hospital, will manage blood pressure as follows; Adjust home antihypertensive regimen as follows- losartan held due to FLORECITA.  FLORECITA better; however, amlodipine was started.  Increase to amlodipine 10mg daily.  Will increase beta blocker as she has been tachycardic as well.  Monitor closely and adjust as necessary.    Will utilize p.r.n. blood pressure medication only if patient's blood pressure greater than  180/110 and she develops symptoms such as worsening chest pain or shortness of breath.

## 2022-07-08 NOTE — ASSESSMENT & PLAN NOTE
This patient does have evidence of infective focus  My overall impression is sepsis. Vital signs were reviewed and noted in progress note.  Antibiotics given-   Antibiotics (From admission, onward)            Start     Stop Route Frequency Ordered    07/07/22 1000  cefTRIAXone (ROCEPHIN) 1 g/50 mL D5W IVPB         -- IV Every 24 hours (non-standard times) 07/07/22 0852        Cultures were taken-   Microbiology Results (last 7 days)     Procedure Component Value Units Date/Time    Blood culture [[id number]]     Order Status: Sent Specimen: Blood     Blood culture [[id number]] Collected: 07/07/22 1734    Order Status: Sent Specimen: Blood Updated: 07/07/22 1734    Narrative:      Collection has been rescheduled by [email or web address] at 07/07/2022 15:04 Reason:   Unable to collect both sets of cultures  Collection has been rescheduled by [email or web address] at 07/07/2022 15:04 Reason:   Unable to collect both sets of cultures    Blood culture [[id number]] Collected: 07/07/22 1502    Order Status: Sent Specimen: Blood Updated: 07/07/22 1502    Blood culture [[id number]] Collected: 07/04/22 1008    Order Status: Completed Specimen: Blood Updated: 07/07/22 1422     Blood Culture, Routine No Growth to date      No Growth to date      No Growth to date      No Growth to date    Blood culture [[id number]] Collected: 07/04/22 1008    Order Status: Completed Specimen: Blood Updated: 07/07/22 1422     Blood Culture, Routine No Growth to date      No Growth to date      No Growth to date      No Growth to date    Blood culture #1 **CANNOT BE ORDERED STAT** [[id number]] Collected: 07/02/22 2213    Order Status: Completed Specimen: Blood Updated: 07/07/22 1212     Blood Culture, Routine No Growth to date      No Growth to date      No Growth to date      No Growth to date      No Growth to date    Blood culture #2 **CANNOT BE ORDERED STAT** [[id number]]  (Abnormal)  (Susceptibility) Collected: 07/02/22 2213    Order Status: Completed Specimen: Blood Updated:  07/06/22 1011     Blood Culture, Routine Gram stain aer bottle: Gram negative rods       Results called to and read back by: Renee Lancaster RN  07/04/2022  01:29      ESCHERICHIA COLI    Urine culture [[id number]]  (Abnormal)  (Susceptibility) Collected: 07/02/22 1948    Order Status: Completed Specimen: Urine Updated: 07/05/22 0923     Urine Culture, Routine ESCHERICHIA COLI  >100,000 cfu/ml      Narrative:      Specimen Source->Urine        Latest lactate reviewed, they are-  No results for input(s): LACTATE in the last 72 hours.    Organ dysfunction indicated by Acute kidney injury and Encephalopathy   Source- Urine    Source control Achieved by- Antibiotics for UTI coverage.     Continue meropenem given history of ESBL  Given 2L bolus in ER; will continue with NS at 100 ml/hr given CHF history  Repeat LA and troponin   Resolved

## 2022-07-08 NOTE — ASSESSMENT & PLAN NOTE
Urine culture from 5/15/22 growing ESBL Klebsiella  Meropenem  Urine culture growing GNR, e coli  Febrile overnight.  Continue IV abx and monitor  Continued fevers. ID consult   Now on IV Rocephin as per sensitivity

## 2022-07-09 LAB
BACTERIA BLD CULT: NORMAL
BACTERIA BLD CULT: NORMAL

## 2022-07-09 PROCEDURE — 99900035 HC TECH TIME PER 15 MIN (STAT)

## 2022-07-09 PROCEDURE — 94761 N-INVAS EAR/PLS OXIMETRY MLT: CPT

## 2022-07-09 PROCEDURE — 25000003 PHARM REV CODE 250: Performed by: NURSE PRACTITIONER

## 2022-07-09 PROCEDURE — 99232 SBSQ HOSP IP/OBS MODERATE 35: CPT | Mod: ,,, | Performed by: INTERNAL MEDICINE

## 2022-07-09 PROCEDURE — 25000003 PHARM REV CODE 250: Performed by: HOSPITALIST

## 2022-07-09 PROCEDURE — 97530 THERAPEUTIC ACTIVITIES: CPT | Mod: CQ

## 2022-07-09 PROCEDURE — 63600175 PHARM REV CODE 636 W HCPCS: Performed by: NURSE PRACTITIONER

## 2022-07-09 PROCEDURE — 63600175 PHARM REV CODE 636 W HCPCS: Performed by: HOSPITALIST

## 2022-07-09 PROCEDURE — 94799 UNLISTED PULMONARY SVC/PX: CPT

## 2022-07-09 PROCEDURE — 99232 PR SUBSEQUENT HOSPITAL CARE,LEVL II: ICD-10-PCS | Mod: ,,, | Performed by: INTERNAL MEDICINE

## 2022-07-09 PROCEDURE — 21400001 HC TELEMETRY ROOM

## 2022-07-09 RX ADMIN — HEPARIN SODIUM 5000 UNITS: 5000 INJECTION INTRAVENOUS; SUBCUTANEOUS at 05:07

## 2022-07-09 RX ADMIN — HEPARIN SODIUM 5000 UNITS: 5000 INJECTION INTRAVENOUS; SUBCUTANEOUS at 02:07

## 2022-07-09 RX ADMIN — OXYCODONE 10 MG: 5 TABLET ORAL at 05:07

## 2022-07-09 RX ADMIN — MENTHOL, METHYL SALICYLATE: 10; 15 CREAM TOPICAL at 09:07

## 2022-07-09 RX ADMIN — PANTOPRAZOLE SODIUM 40 MG: 40 TABLET, DELAYED RELEASE ORAL at 09:07

## 2022-07-09 RX ADMIN — METOPROLOL SUCCINATE 25 MG: 25 TABLET, EXTENDED RELEASE ORAL at 09:07

## 2022-07-09 RX ADMIN — MENTHOL, METHYL SALICYLATE: 10; 15 CREAM TOPICAL at 05:07

## 2022-07-09 RX ADMIN — HYDRALAZINE HYDROCHLORIDE 10 MG: 20 INJECTION, SOLUTION INTRAMUSCULAR; INTRAVENOUS at 05:07

## 2022-07-09 RX ADMIN — CEFTRIAXONE 1 G: 1 INJECTION, SOLUTION INTRAVENOUS at 10:07

## 2022-07-09 RX ADMIN — OXYCODONE 10 MG: 5 TABLET ORAL at 10:07

## 2022-07-09 RX ADMIN — HEPARIN SODIUM 5000 UNITS: 5000 INJECTION INTRAVENOUS; SUBCUTANEOUS at 09:07

## 2022-07-09 RX ADMIN — CYANOCOBALAMIN TAB 1000 MCG 1000 MCG: 1000 TAB at 09:07

## 2022-07-09 RX ADMIN — AMLODIPINE BESYLATE 10 MG: 5 TABLET ORAL at 09:07

## 2022-07-09 RX ADMIN — MENTHOL, METHYL SALICYLATE: 10; 15 CREAM TOPICAL at 10:07

## 2022-07-09 RX ADMIN — OXYCODONE 10 MG: 5 TABLET ORAL at 11:07

## 2022-07-09 NOTE — PLAN OF CARE
Problem: Pain Acute  Goal: Acceptable Pain Control and Functional Ability  Outcome: Ongoing, Progressing   Pt with c/o B hip pain R>L and low back but willing to participate; modA with bed mobility; sit to stand with CG/Maia x 2 with use of RW; tolerated standing to limited degree 2/2 fear of falling and pain; encouragement given throughout session; will cont with POC.

## 2022-07-09 NOTE — PLAN OF CARE
AAOx4. PRN pain meds given. Poor PO intake. IV ABX given per MAR. Sacrum dressing changed. Incontinence care provided. Turn Q2H. Cardiac monitoring maintained. Bed locked in lowest position, bed alarm set and call bell within reach.

## 2022-07-09 NOTE — PROGRESS NOTES
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 coli UTI + bacteremia on cx from 7/2. On Day 3 of CTX, 7/4 blood cx NGTD. Patient afebrile initially but with intermittent low-grade fevers to 100.5 over past 48 hours. ID consulted for persistent fever despite abx.     - presence of large bilateral renal stones complicates picture, although non-obstructive  - bilateral DVT US is negative  - await repeat blood cx  - continue CTX 1g IV q 24H - will need 2 weeks from negative cx  - possible that fever is related to carcinoid             Thank you for your consult. I will follow-up with patient. Please contact us if you have any additional questions.    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 Had been prescribed Keflex the day prior in the outpatient setting for UTI.      In the ED, patient was noted to be somnolent and ill-appearing. Workup was significant for +UA (> 100  WBC, many bacteria, 3+ LE) leukocytosis (WBC 22.03), lactic acidosis, FLORECITA, elevated troponin. CT abd/pelvis with no acute findings; unchanged size of carcinoid tumor with liver mets and lymphadenopathy. Started on meropenem for UTI due to recent urine culture growing ESBL klebsiella (5/15/22, 3/27/22, 2/2/22). Mental status subsequently improved. Patient admitted to hospital medicine service for further management.      Both 7/7/22 urine and 1/2 blood cx growing E. coli (fluoroquinolone resistant). Blood cx have since cleared and patient transitioned to CTX this AM. Patient afebrile until night of 7/5 when she spiked a temp to 100.4 F. This AM she had another temperature of 100.5 F, so ID was consulted for persistent fever despite abx therapy.       Of note, patient has had multiple admissions this year alone for recurrent UTIs. She has known bilateral nephrolithiasis. CTAP this admission again revealed non-obstructing renal calculi, including large calculus in lower pole of the right kidney, w/o hydronephrosis. Has been seen by Urology but decided against stone retrieval due risks of percutaneous nephrolithotomy, and stones not clearly causing UTIs. She was started on methenamine and Vitamin C for UTI ppx in June    Interval History: No events. Slight improvement in fever curve    Review of Systems   Constitutional:  Positive for activity change. Negative for appetite change, chills, diaphoresis, fatigue and fever.   HENT:  Negative for congestion, postnasal drip, rhinorrhea, sinus pressure, sinus pain and sneezing.    Respiratory:  Negative for cough, chest tightness, shortness of breath, wheezing and stridor.    Cardiovascular:  Negative for chest pain, palpitations and leg swelling.   Gastrointestinal:  Negative for abdominal distention, abdominal pain, blood in stool, constipation, diarrhea, nausea and vomiting.   Endocrine: Negative for cold intolerance and heat intolerance.   Genitourinary:  Positive for  dysuria and frequency. Negative for flank pain, hematuria and urgency.   Musculoskeletal:  Negative for gait problem.   Neurological:  Negative for dizziness and weakness.   Psychiatric/Behavioral:  Negative for agitation and behavioral problems.    All other systems reviewed and are negative.  Objective:     Vital Signs (Most Recent):  Temp: 99 °F (37.2 °C) (07/09/22 1308)  Pulse: 100 (07/09/22 1308)  Resp: 18 (07/09/22 1144)  BP: (!) 141/64 (07/09/22 1308)  SpO2: 98 % (07/09/22 1305)   Vital Signs (24h Range):  Temp:  [9.6 °F (-12.4 °C)-102.6 °F (39.2 °C)] 99 °F (37.2 °C)  Pulse:  [] 100  Resp:  [16-18] 18  SpO2:  [95 %-100 %] 98 %  BP: (134-191)/(64-75) 141/64     Weight: 85.7 kg (188 lb 15 oz)  Body mass index is 30.49 kg/m².    Estimated Creatinine Clearance: 58.6 mL/min (based on SCr of 1 mg/dL).    Physical Exam  Constitutional:       General: She is not in acute distress.     Appearance: She is well-developed. She is not ill-appearing or toxic-appearing.   HENT:      Head: Normocephalic and atraumatic.   Neck:      Vascular: No JVD.   Cardiovascular:      Rate and Rhythm: Tachycardia present.   Pulmonary:      Effort: Pulmonary effort is normal. No respiratory distress.   Abdominal:      Tenderness: Tenderness: diffuse.   Musculoskeletal:         General: Normal range of motion.      Cervical back: Normal range of motion and neck supple.   Neurological:      General: No focal deficit present.      Mental Status: She is alert and oriented to person, place, and time. Mental status is at baseline.      Cranial Nerves: No cranial nerve deficit.   Psychiatric:         Mood and Affect: Mood normal.         Behavior: Behavior normal.       Significant Labs: All pertinent labs within the past 24 hours have been reviewed.    Significant Imaging: I have reviewed all pertinent imaging results/findings within the past 24 hours.

## 2022-07-09 NOTE — PT/OT/SLP PROGRESS
Physical Therapy Treatment    Patient Name:  Patito Domingo   MRN:  [id number]    Recommendations:     Discharge Recommendations:  nursing facility, skilled   Discharge Equipment Recommendations:  (defer to SNF)   Barriers to discharge: Decreased caregiver support and increased level of assistance required    Assessment:     Patito Domingo is a 71 y.o. female admitted with a medical diagnosis of Sepsis.  She presents with the following impairments/functional limitations:  weakness, impaired endurance, impaired self care skills, impaired functional mobilty, gait instability, impaired balance, decreased lower extremity function, decreased upper extremity function, decreased safety awareness, pain, decreased ROM, impaired coordination Pt with c/o B hip pain R>L and low back but willing to participate; modA with bed mobility; sit to stand with CG/Maia x 2 with use of RW; tolerated standing to limited degree 2/2 fear of falling and pain; encouragement given throughout session; will cont with POC..    Rehab Prognosis: Good; patient would benefit from acute skilled PT services to address these deficits and reach maximum level of function.    Recent Surgery: * No surgery found *      Plan:     During this hospitalization, patient to be seen 3 x/week to address the identified rehab impairments via gait training, therapeutic activities, therapeutic exercises, neuromuscular re-education and progress toward the following goals:    · Plan of Care Expires:  08/08/22    Subjective       Pain/Comfort:  · Pain Rating 1: 9/10  · Location - Side 1: Bilateral  · Location - Orientation 1: generalized  · Location 1: hip (low back)  · Pain Addressed 1: Distraction, Nurse notified  · Pain Rating Post-Intervention 1: 9/10      Objective:     Communicated with nurse prior to session.  Patient found HOB elevated with bed alarm, PureWick, peripheral IV, telemetry upon PT entry to room.     General Precautions: Standard, fall   Orthopedic  Precautions:N/A   Braces: N/A  Respiratory Status: Room air     Functional Mobility:  · Bed Mobility:     · Rolling Left:  moderate assistance and use of side rail  · Rolling Right: moderate assistance and use of side rail  · Supine to Sit: moderate assistance and use of side rail and HOB elevated, increased time/effort, VCs for technique  · Sit to Supine: moderate assistance and for BLEs  · Transfers:     · Sit to Stand:  contact guard assistance and on first stand, and Maia of 2 persons for 2nd/3rd trial with RW x 3 trials, flexed knees and forward flexed trunk; pt requires VCs for hand placement, technique and erect posture  · Gait: Patient took 6 small side steps to R with min/modA x 2 persons with use of RW; trunk forward flexed, knees/hips flexed; VCs/TCs for erect posture, bringing hips forward and extending knees      AM-PAC 6 CLICK MOBILITY  Turning over in bed (including adjusting bedclothes, sheets and blankets)?: 2  Sitting down on and standing up from a chair with arms (e.g., wheelchair, bedside commode, etc.): 2  Moving from lying on back to sitting on the side of the bed?: 2  Moving to and from a bed to a chair (including a wheelchair)?: 2  Need to walk in hospital room?: 2  Climbing 3-5 steps with a railing?: 1  Basic Mobility Total Score: 11       Therapeutic Activities and Exercises:   Patient transitioned to sit EOB after rolling and totalA for hygiene; pt scooted forward with SBA; pt declined therex in sit and supine; pt stood and sidestepped as described; returned to supine with HOB elevated.    Patient left HOB elevated with all lines intact, call button in reach, bed alarm on and nurse notified..    GOALS:   Multidisciplinary Problems     Physical Therapy Goals        Problem: Physical Therapy    Goal Priority Disciplines Outcome Goal Variances Interventions   Physical Therapy Goal     PT, PT/OT Ongoing, Progressing     Description: Goals to be met by: 8/7/2022     Patient will increase  functional independence with mobility by performin. Supine to sit with Modified [place]  2. Sit to supine with Modified [place]  3. Rolling with Modified [place].  4. Sit to stand transfer with Supervision with RW/rollator  5. Bed to chair transfer with Supervision using Rolling Walker/rollator  6. Gait  x 75 feet with Supervision using Rolling Walker/rollator.   7. Lower extremity exercise program x10 reps with supervision                     Time Tracking:     PT Received On: 22  PT Start Time: 1058     PT Stop Time: 1123  PT Total Time (min): 25 min cotx    Billable Minutes: Therapeutic Activity 13       PT/PTA: PT     PTA Visit Number: 0     2022

## 2022-07-09 NOTE — ASSESSMENT & PLAN NOTE
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 coli UTI + bacteremia on cx from 7/2. On Day 3 of CTX, 7/4 blood cx NGTD. Patient afebrile initially but with intermittent low-grade fevers to 100.5 over past 48 hours. ID consulted for persistent fever despite abx.     - presence of large bilateral renal stones complicates picture, although non-obstructive  - bilateral DVT US is negative  - await repeat blood cx  - continue CTX 1g IV q 24H - will need 2 weeks from negative cx  - possible that fever is related to carcinoid

## 2022-07-09 NOTE — NURSING
Assumed care of patient from PANCHITO Gamble at 7pm last night, found patient to be resting in bed and watching television, vitals WNL, room air, c/o of pain in the right hip and BLE, currently loose stools and patient wearing adult brief with purewick,  gave patient heating pads to tide her over until next pain meds, patient is AAOX4, TB test scheduled for 7/10/22, temps sporadically keep increasing but not high enough for tylenol, chest xray ordered at 6am to help figure out why, hydralazine was also given at 4am due to increased BP over 170 systolically, stage 2 pressure injury on left buttock dressed with triad cream and a mepilex,  all safety precautions are in place, call bell and tray table are within reach of the patient and will continue to monitor.

## 2022-07-09 NOTE — SUBJECTIVE & OBJECTIVE
Interval History: No events. Slight improvement in fever curve    Review of Systems   Constitutional:  Positive for activity change. Negative for appetite change, chills, diaphoresis, fatigue and fever.   HENT:  Negative for congestion, postnasal drip, rhinorrhea, sinus pressure, sinus pain and sneezing.    Respiratory:  Negative for cough, chest tightness, shortness of breath, wheezing and stridor.    Cardiovascular:  Negative for chest pain, palpitations and leg swelling.   Gastrointestinal:  Negative for abdominal distention, abdominal pain, blood in stool, constipation, diarrhea, nausea and vomiting.   Endocrine: Negative for cold intolerance and heat intolerance.   Genitourinary:  Positive for dysuria and frequency. Negative for flank pain, hematuria and urgency.   Musculoskeletal:  Negative for gait problem.   Neurological:  Negative for dizziness and weakness.   Psychiatric/Behavioral:  Negative for agitation and behavioral problems.    All other systems reviewed and are negative.  Objective:     Vital Signs (Most Recent):  Temp: 99 °F (37.2 °C) (07/09/22 1308)  Pulse: 100 (07/09/22 1308)  Resp: 18 (07/09/22 1144)  BP: (!) 141/64 (07/09/22 1308)  SpO2: 98 % (07/09/22 1305)   Vital Signs (24h Range):  Temp:  [9.6 °F (-12.4 °C)-102.6 °F (39.2 °C)] 99 °F (37.2 °C)  Pulse:  [] 100  Resp:  [16-18] 18  SpO2:  [95 %-100 %] 98 %  BP: (134-191)/(64-75) 141/64     Weight: 85.7 kg (188 lb 15 oz)  Body mass index is 30.49 kg/m².    Estimated Creatinine Clearance: 58.6 mL/min (based on SCr of 1 mg/dL).    Physical Exam  Constitutional:       General: She is not in acute distress.     Appearance: She is well-developed. She is not ill-appearing or toxic-appearing.   HENT:      Head: Normocephalic and atraumatic.   Neck:      Vascular: No JVD.   Cardiovascular:      Rate and Rhythm: Tachycardia present.   Pulmonary:      Effort: Pulmonary effort is normal. No respiratory distress.   Abdominal:      Tenderness:  Tenderness: diffuse.   Musculoskeletal:         General: Normal range of motion.      Cervical back: Normal range of motion and neck supple.   Neurological:      General: No focal deficit present.      Mental Status: She is alert and oriented to person, place, and time. Mental status is at baseline.      Cranial Nerves: No cranial nerve deficit.   Psychiatric:         Mood and Affect: Mood normal.         Behavior: Behavior normal.       Significant Labs: All pertinent labs within the past 24 hours have been reviewed.    Significant Imaging: I have reviewed all pertinent imaging results/findings within the past 24 hours.

## 2022-07-09 NOTE — PT/OT/SLP PROGRESS
Physical Therapy Treatment    Patient Name:  Patito Domingo   MRN:  [id number]    Recommendations:     Discharge Recommendations:  nursing facility, skilled   Discharge Equipment Recommendations: other (see comments) (defer to SNF)   Barriers to discharge: Decreased caregiver support    Assessment:     Patito Domingo is a 71 y.o. female admitted with a medical diagnosis of Sepsis.  She presents with the following impairments/functional limitations:  weakness, gait instability, decreased lower extremity function, impaired endurance, impaired balance, decreased coordination, impaired functional mobilty, impaired cardiopulmonary response to activity, decreased safety awareness.    Rehab Prognosis: Good; patient would benefit from acute skilled PT services to address these deficits and reach maximum level of function.    Recent Surgery: * No surgery found *      Plan:     During this hospitalization, patient to be seen 3 x/week to address the identified rehab impairments via gait training, therapeutic activities, therapeutic exercises, neuromuscular re-education and progress toward the following goals:    · Plan of Care Expires:  08/08/22    Subjective     Chief Complaint: Pt with no complaints or concerns at this time.   Patient/Family Comments/goals: Pt agreeable to PT treatment.   Pain/Comfort:  · Pain Rating 1: 0/10      Objective:     Patient found HOB elevated with peripheral IV, telemetry upon PT entry to room.     General Precautions: Standard, fall   Orthopedic Precautions:N/A   Braces: N/A  Respiratory Status: Room air     Functional Mobility:  · Bed Mobility:     · Supine to Sit: moderate assistance  · Sit to Supine: moderate assistance  · Transfers:     · Sit to Stand:  minimum assistance with rolling walker  · Toilet Transfer: minimum assistance with  rolling walker  using  Step Transfer  · Gait: Pt ambulated to and from the EOB to the toilet with RW, min assist requiring verbal as well as tactile cueing  on proper AD placement as well as proper turn technique to arrive to the toilet safely to prevent LOB and reduce fall risk.   · Balance: Pt with fair sitting and standing balance.       AM-PAC 6 CLICK MOBILITY  Turning over in bed (including adjusting bedclothes, sheets and blankets)?: 2  Sitting down on and standing up from a chair with arms (e.g., wheelchair, bedside commode, etc.): 3  Moving from lying on back to sitting on the side of the bed?: 2  Moving to and from a bed to a chair (including a wheelchair)?: 2  Need to walk in hospital room?: 2  Climbing 3-5 steps with a railing?: 1  Basic Mobility Total Score: 12       Therapeutic Activities and Exercises:       Patient left HOB elevated with all lines intact, call button in reach and pharmacy present..    GOALS:   Multidisciplinary Problems     Physical Therapy Goals        Problem: Physical Therapy    Goal Priority Disciplines Outcome Goal Variances Interventions   Physical Therapy Goal     PT, PT/OT Ongoing, Progressing     Description: Goals to be met by: 2022     Patient will increase functional independence with mobility by performin. Supine to sit with Modified [place]  2. Sit to supine with Modified [place]  3. Rolling with Modified [place].  4. Sit to stand transfer with Supervision with RW/rollator  5. Bed to chair transfer with Supervision using Rolling Walker/rollator  6. Gait  x 75 feet with Supervision using Rolling Walker/rollator.   7. Lower extremity exercise program x10 reps with supervision                     Time Tracking:     PT Received On: 22  PT Start Time: 0850     PT Stop Time: 09  PT Total Time (min): 29 min     Billable Minutes: Therapeutic Activity 29    Treatment Type: Treatment  PT/PTA: PTA     PTA Visit Number: 1     2022

## 2022-07-10 LAB
ANION GAP SERPL CALC-SCNC: 13 MMOL/L (ref 8–16)
BASOPHILS # BLD AUTO: 0.01 K/UL (ref 0–0.2)
BASOPHILS NFR BLD: 0.3 % (ref 0–1.9)
BUN SERPL-MCNC: 18 MG/DL (ref 8–23)
CALCIUM SERPL-MCNC: 8.4 MG/DL (ref 8.7–10.5)
CHLORIDE SERPL-SCNC: 108 MMOL/L (ref 95–110)
CO2 SERPL-SCNC: 22 MMOL/L (ref 23–29)
CREAT SERPL-MCNC: 0.9 MG/DL (ref 0.5–1.4)
DIFFERENTIAL METHOD: ABNORMAL
EOSINOPHIL # BLD AUTO: 0 K/UL (ref 0–0.5)
EOSINOPHIL NFR BLD: 0.6 % (ref 0–8)
ERYTHROCYTE [DISTWIDTH] IN BLOOD BY AUTOMATED COUNT: 16.2 % (ref 11.5–14.5)
EST. GFR  (AFRICAN AMERICAN): >60 ML/MIN/1.73 M^2
EST. GFR  (NON AFRICAN AMERICAN): >60 ML/MIN/1.73 M^2
GLUCOSE SERPL-MCNC: 100 MG/DL (ref 70–110)
HCT VFR BLD AUTO: 29.7 % (ref 37–48.5)
HGB BLD-MCNC: 8.9 G/DL (ref 12–16)
IMM GRANULOCYTES # BLD AUTO: 0.06 K/UL (ref 0–0.04)
IMM GRANULOCYTES NFR BLD AUTO: 1.7 % (ref 0–0.5)
LYMPHOCYTES # BLD AUTO: 1 K/UL (ref 1–4.8)
LYMPHOCYTES NFR BLD: 27.4 % (ref 18–48)
MCH RBC QN AUTO: 25.9 PG (ref 27–31)
MCHC RBC AUTO-ENTMCNC: 30 G/DL (ref 32–36)
MCV RBC AUTO: 86 FL (ref 82–98)
MONOCYTES # BLD AUTO: 0.6 K/UL (ref 0.3–1)
MONOCYTES NFR BLD: 16.6 % (ref 4–15)
NEUTROPHILS # BLD AUTO: 1.9 K/UL (ref 1.8–7.7)
NEUTROPHILS NFR BLD: 53.4 % (ref 38–73)
NRBC BLD-RTO: 0 /100 WBC
PLATELET # BLD AUTO: 210 K/UL (ref 150–450)
PMV BLD AUTO: 11.2 FL (ref 9.2–12.9)
POTASSIUM SERPL-SCNC: 3 MMOL/L (ref 3.5–5.1)
RBC # BLD AUTO: 3.44 M/UL (ref 4–5.4)
SODIUM SERPL-SCNC: 143 MMOL/L (ref 136–145)
TB INDURATION 48 - 72 HR READ: 0 MM
WBC # BLD AUTO: 3.5 K/UL (ref 3.9–12.7)

## 2022-07-10 PROCEDURE — 63600175 PHARM REV CODE 636 W HCPCS: Performed by: NURSE PRACTITIONER

## 2022-07-10 PROCEDURE — 99232 SBSQ HOSP IP/OBS MODERATE 35: CPT | Mod: ,,, | Performed by: INTERNAL MEDICINE

## 2022-07-10 PROCEDURE — 99900035 HC TECH TIME PER 15 MIN (STAT)

## 2022-07-10 PROCEDURE — 25000003 PHARM REV CODE 250: Performed by: HOSPITALIST

## 2022-07-10 PROCEDURE — 94761 N-INVAS EAR/PLS OXIMETRY MLT: CPT

## 2022-07-10 PROCEDURE — 21400001 HC TELEMETRY ROOM

## 2022-07-10 PROCEDURE — 25000003 PHARM REV CODE 250: Performed by: INTERNAL MEDICINE

## 2022-07-10 PROCEDURE — 94799 UNLISTED PULMONARY SVC/PX: CPT

## 2022-07-10 PROCEDURE — 25000003 PHARM REV CODE 250: Performed by: NURSE PRACTITIONER

## 2022-07-10 PROCEDURE — 36415 COLL VENOUS BLD VENIPUNCTURE: CPT | Performed by: NURSE PRACTITIONER

## 2022-07-10 PROCEDURE — 85025 COMPLETE CBC W/AUTO DIFF WBC: CPT | Performed by: NURSE PRACTITIONER

## 2022-07-10 PROCEDURE — 99232 PR SUBSEQUENT HOSPITAL CARE,LEVL II: ICD-10-PCS | Mod: ,,, | Performed by: INTERNAL MEDICINE

## 2022-07-10 PROCEDURE — 63600175 PHARM REV CODE 636 W HCPCS: Performed by: HOSPITALIST

## 2022-07-10 PROCEDURE — 80048 BASIC METABOLIC PNL TOTAL CA: CPT | Performed by: NURSE PRACTITIONER

## 2022-07-10 RX ORDER — MAGNESIUM SULFATE HEPTAHYDRATE 40 MG/ML
2 INJECTION, SOLUTION INTRAVENOUS ONCE
Status: COMPLETED | OUTPATIENT
Start: 2022-07-10 | End: 2022-07-11

## 2022-07-10 RX ORDER — AMLODIPINE BESYLATE 10 MG/1
10 TABLET ORAL DAILY
Qty: 30 TABLET | Refills: 11
Start: 2022-07-11 | End: 2023-03-07

## 2022-07-10 RX ORDER — AMOXICILLIN 250 MG
1 CAPSULE ORAL 2 TIMES DAILY
Start: 2022-07-11 | End: 2023-03-07

## 2022-07-10 RX ORDER — METOPROLOL SUCCINATE 25 MG/1
25 TABLET, EXTENDED RELEASE ORAL 2 TIMES DAILY
Qty: 180 TABLET | Refills: 3 | Status: SHIPPED | OUTPATIENT
Start: 2022-07-10 | End: 2023-03-07

## 2022-07-10 RX ORDER — POTASSIUM CHLORIDE 20 MEQ/1
40 TABLET, EXTENDED RELEASE ORAL 2 TIMES DAILY
Status: COMPLETED | OUTPATIENT
Start: 2022-07-10 | End: 2022-07-10

## 2022-07-10 RX ADMIN — DOCUSATE SODIUM - SENNOSIDES 1 TABLET: 50; 8.6 TABLET, FILM COATED ORAL at 01:07

## 2022-07-10 RX ADMIN — OXYCODONE 10 MG: 5 TABLET ORAL at 04:07

## 2022-07-10 RX ADMIN — METOPROLOL SUCCINATE 25 MG: 25 TABLET, EXTENDED RELEASE ORAL at 09:07

## 2022-07-10 RX ADMIN — OXYCODONE 10 MG: 5 TABLET ORAL at 05:07

## 2022-07-10 RX ADMIN — AMLODIPINE BESYLATE 10 MG: 5 TABLET ORAL at 09:07

## 2022-07-10 RX ADMIN — HEPARIN SODIUM 5000 UNITS: 5000 INJECTION INTRAVENOUS; SUBCUTANEOUS at 01:07

## 2022-07-10 RX ADMIN — HEPARIN SODIUM 5000 UNITS: 5000 INJECTION INTRAVENOUS; SUBCUTANEOUS at 05:07

## 2022-07-10 RX ADMIN — PANTOPRAZOLE SODIUM 40 MG: 40 TABLET, DELAYED RELEASE ORAL at 09:07

## 2022-07-10 RX ADMIN — CEFTRIAXONE 1 G: 1 INJECTION, SOLUTION INTRAVENOUS at 10:07

## 2022-07-10 RX ADMIN — DOCUSATE SODIUM - SENNOSIDES 1 TABLET: 50; 8.6 TABLET, FILM COATED ORAL at 12:07

## 2022-07-10 RX ADMIN — HEPARIN SODIUM 5000 UNITS: 5000 INJECTION INTRAVENOUS; SUBCUTANEOUS at 09:07

## 2022-07-10 RX ADMIN — POTASSIUM CHLORIDE 40 MEQ: 1500 TABLET, EXTENDED RELEASE ORAL at 11:07

## 2022-07-10 RX ADMIN — MENTHOL, METHYL SALICYLATE: 10; 15 CREAM TOPICAL at 09:07

## 2022-07-10 RX ADMIN — MENTHOL, METHYL SALICYLATE: 10; 15 CREAM TOPICAL at 04:07

## 2022-07-10 RX ADMIN — CYANOCOBALAMIN TAB 1000 MCG 1000 MCG: 1000 TAB at 09:07

## 2022-07-10 RX ADMIN — OXYCODONE 10 MG: 5 TABLET ORAL at 11:07

## 2022-07-10 RX ADMIN — SIMETHICONE 80 MG: 80 TABLET, CHEWABLE ORAL at 10:07

## 2022-07-10 RX ADMIN — POTASSIUM CHLORIDE 40 MEQ: 1500 TABLET, EXTENDED RELEASE ORAL at 09:07

## 2022-07-10 NOTE — SUBJECTIVE & OBJECTIVE
Interval History: No acute events overnight.  Still with intermittent fevers, as high as 102.6 yesterday, feeling weak, but participating well with PT/OT.  Discussed results of CXR-no acute findings.  Would like to dc to SNF soon.    Review of Systems   Constitutional:  Positive for activity change. Negative for appetite change, chills, diaphoresis, fatigue and fever.   HENT:  Negative for congestion, postnasal drip, rhinorrhea, sinus pressure, sinus pain and sneezing.    Respiratory:  Negative for cough, chest tightness, shortness of breath, wheezing and stridor.    Cardiovascular:  Negative for chest pain, palpitations and leg swelling.   Gastrointestinal:  Negative for abdominal distention, abdominal pain, blood in stool, constipation, diarrhea, nausea and vomiting.   Endocrine: Negative for cold intolerance and heat intolerance.   Genitourinary:  Negative for dysuria, flank pain, frequency, hematuria and urgency.   Musculoskeletal:  Negative for gait problem.   Neurological:  Positive for weakness (generalized). Negative for dizziness.   Psychiatric/Behavioral:  Negative for agitation and behavioral problems.    All other systems reviewed and are negative.  Objective:     Vital Signs (Most Recent):  Temp: (!) 100.9 °F (38.3 °C) (07/09/22 2007)  Pulse: 95 (07/09/22 2007)  Resp: 18 (07/09/22 2007)  BP: (!) 148/67 (07/09/22 2007)  SpO2: 95 % (07/09/22 1922)   Vital Signs (24h Range):  Temp:  [9.6 °F (-12.4 °C)-100.9 °F (38.3 °C)] 100.9 °F (38.3 °C)  Pulse:  [] 95  Resp:  [16-18] 18  SpO2:  [95 %-100 %] 95 %  BP: (134-191)/(64-75) 148/67     Weight: 85.7 kg (188 lb 15 oz)  Body mass index is 30.49 kg/m².    Intake/Output Summary (Last 24 hours) at 7/9/2022 2048  Last data filed at 7/9/2022 1700  Gross per 24 hour   Intake 840 ml   Output 300 ml   Net 540 ml      Physical Exam  Constitutional:       General: She is not in acute distress.     Appearance: She is well-developed. She is not ill-appearing or  toxic-appearing.   HENT:      Head: Normocephalic and atraumatic.   Neck:      Vascular: No JVD.   Cardiovascular:      Rate and Rhythm: Tachycardia present.   Pulmonary:      Effort: Pulmonary effort is normal. No respiratory distress.   Abdominal:      Tenderness: Tenderness: diffuse.   Musculoskeletal:         General: Normal range of motion.      Cervical back: Normal range of motion and neck supple.   Neurological:      General: No focal deficit present.      Mental Status: She is alert and oriented to person, place, and time. Mental status is at baseline.      Cranial Nerves: No cranial nerve deficit.   Psychiatric:         Mood and Affect: Mood normal.         Behavior: Behavior normal.         Thought Content: Thought content normal.     Significant Labs: All pertinent labs within the past 24 hours have been reviewed.  CBC:   Recent Labs   Lab 07/08/22  0605   WBC 4.85   HGB 8.8*   HCT 29.1*        CMP:   Recent Labs   Lab 07/08/22  0605      K 3.3*      CO2 20*      BUN 22   CREATININE 1.0   CALCIUM 8.3*   ANIONGAP 16   EGFRNONAA 58*       Significant Imaging: I have reviewed all pertinent imaging results/findings within the past 24 hours.

## 2022-07-10 NOTE — PLAN OF CARE
Pt remains A+Ox4, repositioned as tolerated. Continual c/o pain, medicated per MAR. Pt frequently requesting stool softeners, bowel medication despite having BMs. Education provided. Tolerating small amounts of diet well. TB skin 0mm. Wound care done. Safety precautions maintained.

## 2022-07-10 NOTE — ASSESSMENT & PLAN NOTE
This patient does have evidence of infective focus  My overall impression is sepsis. Vital signs were reviewed and noted in progress note.  Antibiotics given-   Antibiotics (From admission, onward)            Start     Stop Route Frequency Ordered    07/07/22 1000  cefTRIAXone (ROCEPHIN) 1 g/50 mL D5W IVPB         -- IV Every 24 hours (non-standard times) 07/07/22 0852        Cultures were taken-   Microbiology Results (last 7 days)     Procedure Component Value Units Date/Time    Blood culture [[id number]] Collected: 07/04/22 1008    Order Status: Completed Specimen: Blood Updated: 07/09/22 1422     Blood Culture, Routine No growth after 5 days.    Blood culture [[id number]] Collected: 07/04/22 1008    Order Status: Completed Specimen: Blood Updated: 07/09/22 1422     Blood Culture, Routine No growth after 5 days.    Blood culture [[id number]] Collected: 07/07/22 1502    Order Status: Completed Specimen: Blood Updated: 07/09/22 0612     Blood Culture, Routine No Growth to date      No Growth to date    Blood culture [[id number]] Collected: 07/07/22 1734    Order Status: Completed Specimen: Blood Updated: 07/09/22 0612     Blood Culture, Routine No Growth to date      No Growth to date    Narrative:      Collection has been rescheduled by [email or web address] at 07/07/2022 15:04 Reason:   Unable to collect both sets of cultures  Collection has been rescheduled by [email or web address] at 07/07/2022 15:04 Reason:   Unable to collect both sets of cultures    Blood culture #1 **CANNOT BE ORDERED STAT** [[id number]] Collected: 07/02/22 2213    Order Status: Completed Specimen: Blood Updated: 07/08/22 1212     Blood Culture, Routine No growth after 5 days.    Blood culture [[id number]]     Order Status: Sent Specimen: Blood     Blood culture #2 **CANNOT BE ORDERED STAT** [[id number]]  (Abnormal)  (Susceptibility) Collected: 07/02/22 2213    Order Status: Completed Specimen: Blood Updated: 07/06/22 1011     Blood Culture, Routine Gram stain aer bottle: Gram  negative rods       Results called to and read back by: Renee Lancaster RN  07/04/2022  01:29      ESCHERICHIA COLI    Urine culture [[id number]]  (Abnormal)  (Susceptibility) Collected: 07/02/22 1948    Order Status: Completed Specimen: Urine Updated: 07/05/22 0923     Urine Culture, Routine ESCHERICHIA COLI  >100,000 cfu/ml      Narrative:      Specimen Source->Urine        Latest lactate reviewed, they are-  No results for input(s): LACTATE in the last 72 hours.    Organ dysfunction indicated by Acute kidney injury and Encephalopathy   Source- Urine    Source control Achieved by- Antibiotics for UTI coverage.     Continue meropenem given history of ESBL  Given 2L bolus in ER; will continue with NS at 100 ml/hr given CHF history  Repeat LA and troponin   Resolved

## 2022-07-10 NOTE — SUBJECTIVE & OBJECTIVE
Interval History: No events. Slight improvement in fever curve    Review of Systems   Constitutional:  Positive for activity change. Negative for appetite change, chills, diaphoresis, fatigue and fever.   HENT:  Negative for congestion, postnasal drip, rhinorrhea, sinus pressure, sinus pain and sneezing.    Respiratory:  Negative for cough, chest tightness, shortness of breath, wheezing and stridor.    Cardiovascular:  Negative for chest pain, palpitations and leg swelling.   Gastrointestinal:  Negative for abdominal distention, abdominal pain, blood in stool, constipation, diarrhea, nausea and vomiting.   Endocrine: Negative for cold intolerance and heat intolerance.   Genitourinary:  Positive for dysuria and frequency. Negative for flank pain, hematuria and urgency.   Musculoskeletal:  Negative for gait problem.   Neurological:  Negative for dizziness and weakness.   Psychiatric/Behavioral:  Negative for agitation and behavioral problems.    All other systems reviewed and are negative.  Objective:     Vital Signs (Most Recent):  Temp: 98.4 °F (36.9 °C) (07/10/22 0729)  Pulse: 91 (07/10/22 0729)  Resp: 20 (07/10/22 0729)  BP: 137/65 (07/10/22 0729)  SpO2: 96 % (07/10/22 0853)   Vital Signs (24h Range):  Temp:  [9.6 °F (-12.4 °C)-100.9 °F (38.3 °C)] 98.4 °F (36.9 °C)  Pulse:  [] 91  Resp:  [17-20] 20  SpO2:  [95 %-98 %] 96 %  BP: (137-191)/(63-74) 137/65     Weight: 84.2 kg (185 lb 10 oz)  Body mass index is 29.96 kg/m².    Estimated Creatinine Clearance: 64.5 mL/min (based on SCr of 0.9 mg/dL).    Physical Exam  Constitutional:       General: She is not in acute distress.     Appearance: She is well-developed. She is not ill-appearing or toxic-appearing.   HENT:      Head: Normocephalic and atraumatic.   Neck:      Vascular: No JVD.   Cardiovascular:      Rate and Rhythm: Tachycardia present.   Pulmonary:      Effort: Pulmonary effort is normal. No respiratory distress.   Abdominal:      Tenderness:  Tenderness: diffuse.   Musculoskeletal:         General: Normal range of motion.      Cervical back: Normal range of motion and neck supple.   Neurological:      General: No focal deficit present.      Mental Status: She is alert and oriented to person, place, and time. Mental status is at baseline.      Cranial Nerves: No cranial nerve deficit.   Psychiatric:         Mood and Affect: Mood normal.         Behavior: Behavior normal.       Significant Labs: All pertinent labs within the past 24 hours have been reviewed.    Significant Imaging: I have reviewed all pertinent imaging results/findings within the past 24 hours.

## 2022-07-10 NOTE — PLAN OF CARE
Problem: Adult Inpatient Plan of Care  Goal: Plan of Care Review  Outcome: Ongoing, Progressing  Goal: Patient-Specific Goal (Individualized)  Outcome: Ongoing, Progressing  Goal: Absence of Hospital-Acquired Illness or Injury  Outcome: Ongoing, Progressing  Goal: Optimal Comfort and Wellbeing  Outcome: Ongoing, Progressing  Goal: Readiness for Transition of Care  Outcome: Ongoing, Progressing     Problem: Adjustment to Illness (Sepsis/Septic Shock)  Goal: Optimal Coping  Outcome: Ongoing, Progressing     Problem: Bleeding (Sepsis/Septic Shock)  Goal: Absence of Bleeding  Outcome: Ongoing, Progressing     Problem: Glycemic Control Impaired (Sepsis/Septic Shock)  Goal: Blood Glucose Level Within Desired Range  Outcome: Ongoing, Progressing     Problem: Infection Progression (Sepsis/Septic Shock)  Goal: Absence of Infection Signs and Symptoms  Outcome: Ongoing, Progressing     Problem: Nutrition Impaired (Sepsis/Septic Shock)  Goal: Optimal Nutrition Intake  Outcome: Ongoing, Progressing     Problem: Fluid and Electrolyte Imbalance (Acute Kidney Injury/Impairment)  Goal: Fluid and Electrolyte Balance  Outcome: Ongoing, Progressing     Problem: Oral Intake Inadequate (Acute Kidney Injury/Impairment)  Goal: Optimal Nutrition Intake  Outcome: Ongoing, Progressing     Problem: Renal Function Impairment (Acute Kidney Injury/Impairment)  Goal: Effective Renal Function  Outcome: Ongoing, Progressing     Problem: Fall Injury Risk  Goal: Absence of Fall and Fall-Related Injury  Outcome: Ongoing, Progressing     Problem: Skin Injury Risk Increased  Goal: Skin Health and Integrity  Outcome: Ongoing, Progressing     Problem: Coping Ineffective (Oncology Care)  Goal: Effective Coping  Outcome: Ongoing, Progressing     Problem: Fatigue (Oncology Care)  Goal: Improved Activity Tolerance  Outcome: Ongoing, Progressing     Problem: Oral Intake Altered (Oncology Care)  Goal: Optimal Oral Intake  Outcome: Ongoing, Progressing      Problem: Pain Acute (Oncology Care)  Goal: Optimal Pain Control  Outcome: Ongoing, Progressing     Problem: UTI (Urinary Tract Infection)  Goal: Improved Infection Symptoms  Outcome: Ongoing, Progressing     Problem: Heart Failure Comorbidity  Goal: Maintenance of Heart Failure Symptom Control  Outcome: Ongoing, Progressing     Problem: Hypertension Comorbidity  Goal: Blood Pressure in Desired Range  Outcome: Ongoing, Progressing     Problem: Muscle Strength Impairment (Functional Deficit)  Goal: Improved Muscle Strength  Outcome: Ongoing, Progressing     Problem: Muscle Tone Impairment (Functional Deficit)  Goal: Improved Muscle Tone  Outcome: Ongoing, Progressing     Problem: Range of Motion Impairment (Functional Deficit)  Goal: Optimal Range of Motion  Outcome: Ongoing, Progressing     Problem: Fatigue  Goal: Improved Activity Tolerance  Outcome: Ongoing, Progressing     Problem: Pain Acute  Goal: Acceptable Pain Control and Functional Ability  Outcome: Ongoing, Progressing     Problem: Impaired Wound Healing  Goal: Optimal Wound Healing  Outcome: Ongoing, Progressing     Problem: Coping Ineffective  Goal: Effective Coping  Outcome: Ongoing, Progressing

## 2022-07-10 NOTE — PROGRESS NOTES
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 She has also noticed abd pain, n/v, increased urinary frequency/urgency and fell at home last night but denies LOC. Pt denies chest pain, sob, palpitations, diarrhea, constipation. Was prescribed keflex yesterday for UTI. Pt noted to be very somnolent and minimally conversant upon arrival to ED. Workup significant for +UA, leukocytosis, lactic acidosis, FLORECITA, elevated troponin. CT abd/pelvis with no acute findings; unchanged size of carcinoid tumor with liver mets and lymphadenopathy. Started on meropenem for UTI due to recent urine culture growing ESBL klebsiella. Mental status has since improved and patient now alert and oriented x4. Patient will be admitted to hospital medicine service for further management.       Overview/Hospital Course:  Patito Domingo was monitored closely during her hospitalization.  She was admitted with a diagnosis of sepsis 2/2 UTI, FLORECITA and non-traumatic rhabdomyolysis.  Urine and blood cultures were obtained and she was placed on IV meropenem.  Her admitting troponin was elevated and cardiology was consulted.  An echocardiogram was obtained revealing normal ejection fraction with normal wall motion.  Her troponin trended down.  She was given gentle IVF and her FLORECITA as well as rhabdomyolysis improved.  Her blood culture was positive for GNR, and repeat cultures were drawn.  Urine culture was positive for ESCHERICHIA COLI, sensitive to Rocephin and abx were  de-escalated from meropenem to IV rocephin.  PT/OT were consulted.  Pt c/o right hip pain so an xray was obtained which was negative for fracture.  She developed intermittent low grade fevers despite abx therapy and ID was consulted.        Interval History: No acute events overnight.  Still with intermittent fevers, as high as 102.6 yesterday, feeling weak, but participating well with PT/OT.  Discussed results of CXR-no acute findings.  Would like to dc to SNF soon.    Review of Systems   Constitutional:  Positive for activity change. Negative for appetite change, chills, diaphoresis, fatigue and fever.   HENT:  Negative for congestion, postnasal drip, rhinorrhea, sinus pressure, sinus pain and sneezing.    Respiratory:  Negative for cough, chest tightness, shortness of breath, wheezing and stridor.    Cardiovascular:  Negative for chest pain, palpitations and leg swelling.   Gastrointestinal:  Negative for abdominal distention, abdominal pain, blood in stool, constipation, diarrhea, nausea and vomiting.   Endocrine: Negative for cold intolerance and heat intolerance.   Genitourinary:  Negative for dysuria, flank pain, frequency, hematuria and urgency.   Musculoskeletal:  Negative for gait problem.   Neurological:  Positive for weakness (generalized). Negative for dizziness.   Psychiatric/Behavioral:  Negative for agitation and behavioral problems.    All other systems reviewed and are negative.  Objective:     Vital Signs (Most Recent):  Temp: (!) 100.9 °F (38.3 °C) (07/09/22 2007)  Pulse: 95 (07/09/22 2007)  Resp: 18 (07/09/22 2007)  BP: (!) 148/67 (07/09/22 2007)  SpO2: 95 % (07/09/22 1922)   Vital Signs (24h Range):  Temp:  [9.6 °F (-12.4 °C)-100.9 °F (38.3 °C)] 100.9 °F (38.3 °C)  Pulse:  [] 95  Resp:  [16-18] 18  SpO2:  [95 %-100 %] 95 %  BP: (134-191)/(64-75) 148/67     Weight: 85.7 kg (188 lb 15 oz)  Body mass index is 30.49 kg/m².    Intake/Output Summary (Last 24 hours) at 7/9/2022 2048  Last data  filed at 7/9/2022 1700  Gross per 24 hour   Intake 840 ml   Output 300 ml   Net 540 ml      Physical Exam  Constitutional:       General: She is not in acute distress.     Appearance: She is well-developed. She is not ill-appearing or toxic-appearing.   HENT:      Head: Normocephalic and atraumatic.   Neck:      Vascular: No JVD.   Cardiovascular:      Rate and Rhythm: Tachycardia present.   Pulmonary:      Effort: Pulmonary effort is normal. No respiratory distress.   Abdominal:      Tenderness: Tenderness: diffuse.   Musculoskeletal:         General: Normal range of motion.      Cervical back: Normal range of motion and neck supple.   Neurological:      General: No focal deficit present.      Mental Status: She is alert and oriented to person, place, and time. Mental status is at baseline.      Cranial Nerves: No cranial nerve deficit.   Psychiatric:         Mood and Affect: Mood normal.         Behavior: Behavior normal.         Thought Content: Thought content normal.     Significant Labs: All pertinent labs within the past 24 hours have been reviewed.  CBC:   Recent Labs   Lab 07/08/22  0605   WBC 4.85   HGB 8.8*   HCT 29.1*        CMP:   Recent Labs   Lab 07/08/22  0605      K 3.3*      CO2 20*      BUN 22   CREATININE 1.0   CALCIUM 8.3*   ANIONGAP 16   EGFRNONAA 58*       Significant Imaging: I have reviewed all pertinent imaging results/findings within the past 24 hours.      Assessment/Plan:      * Sepsis  This patient does have evidence of infective focus  My overall impression is sepsis. Vital signs were reviewed and noted in progress note.  Antibiotics given-   Antibiotics (From admission, onward)            Start     Stop Route Frequency Ordered    07/07/22 1000  cefTRIAXone (ROCEPHIN) 1 g/50 mL D5W IVPB         -- IV Every 24 hours (non-standard times) 07/07/22 0852        Cultures were taken-   Microbiology Results (last 7 days)     Procedure Component Value Units Date/Time     Blood culture [[id number]] Collected: 07/04/22 1008    Order Status: Completed Specimen: Blood Updated: 07/09/22 1422     Blood Culture, Routine No growth after 5 days.    Blood culture [[id number]] Collected: 07/04/22 1008    Order Status: Completed Specimen: Blood Updated: 07/09/22 1422     Blood Culture, Routine No growth after 5 days.    Blood culture [[id number]] Collected: 07/07/22 1502    Order Status: Completed Specimen: Blood Updated: 07/09/22 0612     Blood Culture, Routine No Growth to date      No Growth to date    Blood culture [[id number]] Collected: 07/07/22 1734    Order Status: Completed Specimen: Blood Updated: 07/09/22 0612     Blood Culture, Routine No Growth to date      No Growth to date    Narrative:      Collection has been rescheduled by [email or web address] at 07/07/2022 15:04 Reason:   Unable to collect both sets of cultures  Collection has been rescheduled by [email or web address] at 07/07/2022 15:04 Reason:   Unable to collect both sets of cultures    Blood culture #1 **CANNOT BE ORDERED STAT** [[id number]] Collected: 07/02/22 2213    Order Status: Completed Specimen: Blood Updated: 07/08/22 1212     Blood Culture, Routine No growth after 5 days.    Blood culture [[id number]]     Order Status: Sent Specimen: Blood     Blood culture #2 **CANNOT BE ORDERED STAT** [[id number]]  (Abnormal)  (Susceptibility) Collected: 07/02/22 2213    Order Status: Completed Specimen: Blood Updated: 07/06/22 1011     Blood Culture, Routine Gram stain aer bottle: Gram negative rods       Results called to and read back by: Renee Lancaster RN  07/04/2022  01:29      ESCHERICHIA COLI    Urine culture [[id number]]  (Abnormal)  (Susceptibility) Collected: 07/02/22 1948    Order Status: Completed Specimen: Urine Updated: 07/05/22 0923     Urine Culture, Routine ESCHERICHIA COLI  >100,000 cfu/ml      Narrative:      Specimen Source->Urine        Latest lactate reviewed, they are-  No results for input(s): LACTATE in the last 72 hours.    Organ dysfunction  indicated by Acute kidney injury and Encephalopathy   Source- Urine    Source control Achieved by- Antibiotics for UTI coverage.     Continue meropenem given history of ESBL  Given 2L bolus in ER; will continue with NS at 100 ml/hr given CHF history  Repeat LA and troponin   Resolved          Bacteremia  Blood culture 1 tube growing GNR  Possible seeding from bladder  Cont meropenem  Repeat blood cultures-NGTD  Now on IV rocephin  ID consulted      Non-traumatic rhabdomyolysis  CK 1842  FLORECITA possibly a complication of rhabdo, although CK not significantly elevated  Received fluid bolus due to sepsis  Hold further fluid due to HF  Trend CK-slowly decreasing  Gentle fluids overnight and reassess in am  Continued improvement      Lactic acidosis  Due to sepsis  Improving      NSTEMI (non-ST elevated myocardial infarction)  Trop trending down. No EKG changes or c/o chest pain  TTE reviewed.  Cardiology consulted        Chronic diastolic (congestive) heart failure  No signs of decompensation  Cont metoprolol  TTE showing grade I diastolic dysfunction  Monitor I&O         FLORECITA (acute kidney injury)  Lab Results   Component Value Date    CREATININE 1.0 07/07/2022     Sr Cr elevated secondary to sepsis  Cont to monitor with daily labs   Avoid nephrotoxins.   Renally dose all medications   Monitor events that may lead to decreased renal perfusion (hypovolemia, hypotension, sepsis).   Monitor urine output (goal 0.5 mL/kg/hr) to assure that no obstruction precipitates worsening in GFR.  Serum bicarb level 19. Anticipate improvement with sepsis management.   FLORECITA possibly due to sepsis vs rhabdo  Gentle IVFH-improving  resolved      HLD (hyperlipidemia)  Continue atorvastatin     Essential hypertension  Chronic, uncontrolled.  Latest blood pressure and vitals reviewed-   Temp:  [9.6 °F (-12.4 °C)-100.9 °F (38.3 °C)]   Pulse:  []   Resp:  [16-18]   BP: (134-191)/(64-75)   SpO2:  [95 %-100 %] .   Home meds for hypertension  were reviewed and noted below.   Hypertension Medications             losartan (COZAAR) 100 MG tablet TAKE 1 TABLET (100 MG TOTAL) BY MOUTH ONCE DAILY.    metoprolol succinate (TOPROL-XL) 25 MG 24 hr tablet Take 0.5 tablets (12.5 mg total) by mouth once daily.          While in the hospital, will manage blood pressure as follows; Adjust home antihypertensive regimen as follows- losartan held due to FLORECITA.  FLORECITA better; however, amlodipine was started.  Increase to amlodipine 10mg daily.  Will increase beta blocker as she has been tachycardic as well.  Monitor closely and adjust as necessary.    Will utilize p.r.n. blood pressure medication only if patient's blood pressure greater than  180/110 and she develops symptoms such as worsening chest pain or shortness of breath.  BP has improved.        Acute cystitis with hematuria  Urine culture from 5/15/22 growing ESBL Klebsiella  Meropenem  Urine culture growing GNR, e coli  Febrile overnight.  Continue IV abx and monitor  Continued fevers. ID consult   Now on IV Rocephin as per sensitivity      Metastatic carcinoid tumor  ER physician discussed the case with Dr. Perez with neuroendocrine services  Continue follow up outpatient as scheduled       VTE Risk Mitigation (From admission, onward)         Ordered     heparin (porcine) injection 5,000 Units  Every 8 hours         07/03/22 0058     IP VTE HIGH RISK PATIENT  Once         07/03/22 0058     Place sequential compression device  Until discontinued         07/03/22 0058                      I have assessed these finding virtually using telemed platform and with assistance of bedside nurse                 The attending portion of this evaluation, treatment, and documentation was performed per BERENICE Cook via Telemedicine AudioVisual using the secure [employer] software platform with 2 way audio/video. The provider was located off-site and the patient is located in the hospital. The aforementioned video  software was utilized to document the relevant history and physical exam    BERENICE Cook  Department of [facility]   [facility]

## 2022-07-10 NOTE — ASSESSMENT & PLAN NOTE
Chronic, uncontrolled.  Latest blood pressure and vitals reviewed-   Temp:  [9.6 °F (-12.4 °C)-100.9 °F (38.3 °C)]   Pulse:  []   Resp:  [16-18]   BP: (134-191)/(64-75)   SpO2:  [95 %-100 %] .   Home meds for hypertension were reviewed and noted below.   Hypertension Medications             losartan (COZAAR) 100 MG tablet TAKE 1 TABLET (100 MG TOTAL) BY MOUTH ONCE DAILY.    metoprolol succinate (TOPROL-XL) 25 MG 24 hr tablet Take 0.5 tablets (12.5 mg total) by mouth once daily.          While in the hospital, will manage blood pressure as follows; Adjust home antihypertensive regimen as follows- losartan held due to FLORECITA.  FLORECITA better; however, amlodipine was started.  Increase to amlodipine 10mg daily.  Will increase beta blocker as she has been tachycardic as well.  Monitor closely and adjust as necessary.    Will utilize p.r.n. blood pressure medication only if patient's blood pressure greater than  180/110 and she develops symptoms such as worsening chest pain or shortness of breath.  BP has improved.

## 2022-07-10 NOTE — NURSING
Assumed care of patient from PANCHITO Greco at 7pm last night, found patient to be resting in bed and watching television, vitals WNL, Q2 turns, room air, c/o of pain in the right hip and BLE, currently loose stools and patient wearing adult brief, used heating pads to tide her over until next pain meds, patient is AAOX4, TB test scheduled for 7/10/22, stage 2 pressure injury on left buttock dressed with triad cream and a mepilex,  all safety precautions are in place, call bell and tray table are within reach of the patient and will continue to monitor.

## 2022-07-10 NOTE — ASSESSMENT & PLAN NOTE
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 coli UTI + bacteremia on cx from 7/2. On Day 3 of CTX, 7/4 blood cx NGTD. Patient afebrile initially but with intermittent low-grade fevers to 100.5 over past 48 hours. ID consulted for persistent fever despite abx.     - presence of large bilateral renal stones complicates picture, although non-obstructive  - bilateral DVT US is negative  - await repeat blood cx -ngtd  - continue CTX 1g IV q 24H - will need 2 weeks from negative cx - start date is 7/2  - possible that fever is related to carcinoid

## 2022-07-11 ENCOUNTER — OUTPATIENT CASE MANAGEMENT (OUTPATIENT)
Dept: ADMINISTRATIVE | Facility: OTHER | Age: 72
End: 2022-07-11
Payer: MEDICARE

## 2022-07-11 PROBLEM — Z51.5 PALLIATIVE CARE ENCOUNTER: Status: ACTIVE | Noted: 2022-07-11

## 2022-07-11 LAB
ANION GAP SERPL CALC-SCNC: 11 MMOL/L (ref 8–16)
BUN SERPL-MCNC: 18 MG/DL (ref 8–23)
CALCIUM SERPL-MCNC: 8 MG/DL (ref 8.7–10.5)
CHLORIDE SERPL-SCNC: 109 MMOL/L (ref 95–110)
CO2 SERPL-SCNC: 23 MMOL/L (ref 23–29)
CREAT SERPL-MCNC: 0.8 MG/DL (ref 0.5–1.4)
EST. GFR  (AFRICAN AMERICAN): >60 ML/MIN/1.73 M^2
EST. GFR  (NON AFRICAN AMERICAN): >60 ML/MIN/1.73 M^2
GLUCOSE SERPL-MCNC: 101 MG/DL (ref 70–110)
POTASSIUM SERPL-SCNC: 3.7 MMOL/L (ref 3.5–5.1)
SODIUM SERPL-SCNC: 143 MMOL/L (ref 136–145)

## 2022-07-11 PROCEDURE — 99223 1ST HOSP IP/OBS HIGH 75: CPT | Mod: ,,, | Performed by: NURSE PRACTITIONER

## 2022-07-11 PROCEDURE — 36415 COLL VENOUS BLD VENIPUNCTURE: CPT | Performed by: NURSE PRACTITIONER

## 2022-07-11 PROCEDURE — 36410 VNPNXR 3YR/> PHY/QHP DX/THER: CPT

## 2022-07-11 PROCEDURE — 63600175 PHARM REV CODE 636 W HCPCS: Performed by: NURSE PRACTITIONER

## 2022-07-11 PROCEDURE — A4216 STERILE WATER/SALINE, 10 ML: HCPCS | Performed by: HOSPITALIST

## 2022-07-11 PROCEDURE — 21400001 HC TELEMETRY ROOM

## 2022-07-11 PROCEDURE — 94761 N-INVAS EAR/PLS OXIMETRY MLT: CPT

## 2022-07-11 PROCEDURE — C1751 CATH, INF, PER/CENT/MIDLINE: HCPCS

## 2022-07-11 PROCEDURE — 99900035 HC TECH TIME PER 15 MIN (STAT)

## 2022-07-11 PROCEDURE — 99223 PR INITIAL HOSPITAL CARE,LEVL III: ICD-10-PCS | Mod: ,,, | Performed by: NURSE PRACTITIONER

## 2022-07-11 PROCEDURE — 97530 THERAPEUTIC ACTIVITIES: CPT | Mod: CO

## 2022-07-11 PROCEDURE — 25000003 PHARM REV CODE 250: Performed by: HOSPITALIST

## 2022-07-11 PROCEDURE — 25000003 PHARM REV CODE 250: Performed by: NURSE PRACTITIONER

## 2022-07-11 PROCEDURE — 80048 BASIC METABOLIC PNL TOTAL CA: CPT | Performed by: NURSE PRACTITIONER

## 2022-07-11 PROCEDURE — 63600175 PHARM REV CODE 636 W HCPCS: Performed by: HOSPITALIST

## 2022-07-11 RX ORDER — MICONAZOLE NITRATE 2 %
POWDER (GRAM) TOPICAL 2 TIMES DAILY
Status: DISCONTINUED | OUTPATIENT
Start: 2022-07-11 | End: 2022-07-12 | Stop reason: HOSPADM

## 2022-07-11 RX ORDER — SODIUM CHLORIDE 0.9 % (FLUSH) 0.9 %
10 SYRINGE (ML) INJECTION EVERY 6 HOURS
Status: DISCONTINUED | OUTPATIENT
Start: 2022-07-11 | End: 2022-07-12 | Stop reason: HOSPADM

## 2022-07-11 RX ORDER — SODIUM CHLORIDE 0.9 % (FLUSH) 0.9 %
10 SYRINGE (ML) INJECTION
Status: DISCONTINUED | OUTPATIENT
Start: 2022-07-11 | End: 2022-07-12 | Stop reason: HOSPADM

## 2022-07-11 RX ADMIN — SODIUM CHLORIDE, PRESERVATIVE FREE 10 ML: 5 INJECTION INTRAVENOUS at 06:07

## 2022-07-11 RX ADMIN — Medication 10 ML: at 05:07

## 2022-07-11 RX ADMIN — MAGNESIUM SULFATE 2 G: 2 INJECTION INTRAVENOUS at 12:07

## 2022-07-11 RX ADMIN — OXYCODONE 10 MG: 5 TABLET ORAL at 01:07

## 2022-07-11 RX ADMIN — SODIUM CHLORIDE, PRESERVATIVE FREE 10 ML: 5 INJECTION INTRAVENOUS at 11:07

## 2022-07-11 RX ADMIN — MICONAZOLE NITRATE: 20 POWDER TOPICAL at 08:07

## 2022-07-11 RX ADMIN — OXYCODONE 10 MG: 5 TABLET ORAL at 12:07

## 2022-07-11 RX ADMIN — CEFTRIAXONE 1 G: 1 INJECTION, SOLUTION INTRAVENOUS at 09:07

## 2022-07-11 RX ADMIN — PANTOPRAZOLE SODIUM 40 MG: 40 TABLET, DELAYED RELEASE ORAL at 09:07

## 2022-07-11 RX ADMIN — METOPROLOL SUCCINATE 25 MG: 25 TABLET, EXTENDED RELEASE ORAL at 09:07

## 2022-07-11 RX ADMIN — HEPARIN SODIUM 5000 UNITS: 5000 INJECTION INTRAVENOUS; SUBCUTANEOUS at 01:07

## 2022-07-11 RX ADMIN — OXYCODONE 10 MG: 5 TABLET ORAL at 07:07

## 2022-07-11 RX ADMIN — HEPARIN SODIUM 5000 UNITS: 5000 INJECTION INTRAVENOUS; SUBCUTANEOUS at 11:07

## 2022-07-11 RX ADMIN — AMLODIPINE BESYLATE 10 MG: 5 TABLET ORAL at 09:07

## 2022-07-11 RX ADMIN — CYANOCOBALAMIN TAB 1000 MCG 1000 MCG: 1000 TAB at 09:07

## 2022-07-11 RX ADMIN — OXYCODONE 10 MG: 5 TABLET ORAL at 06:07

## 2022-07-11 RX ADMIN — METOPROLOL SUCCINATE 25 MG: 25 TABLET, EXTENDED RELEASE ORAL at 08:07

## 2022-07-11 RX ADMIN — MICONAZOLE NITRATE: 20 POWDER TOPICAL at 01:07

## 2022-07-11 NOTE — ASSESSMENT & PLAN NOTE
Lab Results   Component Value Date    CREATININE 0.9 07/10/2022     Sr Cr elevated secondary to sepsis  Cont to monitor with daily labs   Avoid nephrotoxins.   Renally dose all medications   Monitor events that may lead to decreased renal perfusion (hypovolemia, hypotension, sepsis).   Monitor urine output (goal 0.5 mL/kg/hr) to assure that no obstruction precipitates worsening in GFR.  Serum bicarb level 19. Anticipate improvement with sepsis management.   FLORECITA possibly due to sepsis vs rhabdo  Gentle IVFH-improving  resolved

## 2022-07-11 NOTE — PLAN OF CARE
Suzie spoke with Brandee with [facility]. Per Brandee, pt has been accepted for [facility] and has submitted insurance auth.     Sw met with pt to discuss d/c planning. Sw informed pt that she has been accepted for [facility]. Pt stated she is agreeable to transfer to [facility] upon d/c. Pt requested that Sw called and update pt's son Ryan. Sw encouraged pt to call with any questions or concerns.     Sw spoke with pt's son Ryan. Sw informed Ryan that pt will transferring to [facility] upon d/c. Ryan is agreeable for pt to transfer to [facility] upon d/c. Suzie encouraged Cecerenetta to call with any questions or concerns.     Awaiting insurance auth.    Sw will continue to follow pt for d/c planning.     Future Appointments   Date Time Provider Department Center   7/15/2022  8:00 AM Amy Bright NP [facility] [place]   9/1/2022  9:20 AM Ervin Parikh MD [facility] UROLOG[facility]   9/21/2022 10:40 AM DO TARAH Savage [facility] [place]         07/11/22 1258   Post-Acute Status   Post-Acute Authorization Placement   Post-Acute Placement Status Pending payor review/awaiting authorization (if required)   Discharge Plan   Discharge Plan A Skilled Nursing Facility

## 2022-07-11 NOTE — ASSESSMENT & PLAN NOTE
Urine culture from 5/15/22 growing ESBL Klebsiella  Meropenem  Urine culture growing GNR, e coli  Febrile overnight.  Continue IV abx and monitor  Continued fevers. ID consult   Now on IV Rocephin as per sensitivity-will need 2 wk course

## 2022-07-11 NOTE — HPI
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 She has also noticed abd pain, n/v, increased urinary frequency/urgency and fell at home last night but denies LOC. Pt denied chest pain, sob, palpitations, diarrhea, constipation. Was prescribed keflex yesterday for UTI. Pt noted to be very somnolent and minimally conversant upon arrival to ED. Workup significant for +UA, leukocytosis, lactic acidosis, FLORECITA, elevated troponin. CT abd/pelvis with no acute findings; unchanged size of carcinoid tumor with liver mets and lymphadenopathy. Started on meropenem for UTI due to recent urine culture growing ESBL klebsiella. Mental status has since improved and patient was alert and oriented x4. She was admitted with a diagnosis of sepsis 2/2 UTI, FLORECITA and non-traumatic rhabdomyolysis.  Urine and blood cultures were obtained and she was placed on IV meropenem.  Her admitting troponin was elevated and cardiology was consulted.  An echocardiogram was obtained revealing normal ejection fraction with normal wall motion.  Her troponin trended down.  She was given gentle IVF and her FLORECITA as well as rhabdomyolysis improved.  Her blood culture was positive for GNR, and repeat cultures were drawn.  Urine culture was positive for ESCHERICHIA COLI, sensitive to Rocephin and abx were de-escalated from meropenem to IV rocephin.  PT/OT were consulted.  Pt c/o right hip pain so an xray was obtained which was negative for fracture.  She c/o swelling to RLE.  An ultrasound of the BLE was performed which showed no evidence of DVT.  She developed intermittent low grade fevers despite abx therapy and ID was consulted.  Per ID, presence of large bilateral renal stones complicates picture, although non-obstructive.  Previously assessed by urology, but decided against stone  retrieval due to risks of percutaneous nephrolithotomy, and stones not clearly causing UTIs.  ID recommendations are for IV rocephin 1gm q24h x 2 weeks, start date 7/2/22.  Plan DC to SNF with PICC or midline for continued abx treatment.   to arrange.    Palliative medicine was consulted for goals of care/advance care planning.

## 2022-07-11 NOTE — PLAN OF CARE
Problem: Occupational Therapy  Goal: Occupational Therapy Goal  Description: Goals to be met by: 8/7/2022     Patient will increase functional independence with ADLs by performing:    LE Dressing with Set-up Assistance.  Grooming while standing with Set-up Assistance.  Toileting from toilet with Supervision for hygiene and clothing management.   Supine to sit with Modified [place].  Step transfer with Supervision & appropriate AD.   Toilet transfer to toilet with Supervision & appropriate AD.     Outcome: Ongoing, Progressing   Patito TOBIAS Domingo is a 71 y.o. female with a medical diagnosis of Sepsis.  Performance deficits affecting function are weakness, impaired endurance, impaired self care skills, impaired functional mobilty, gait instability, decreased lower extremity function, impaired balance, decreased safety awareness, pain, decreased ROM. Pt found in bed, agreeable to therapy despite pain in B hips, R>L.  Pt required Mod A with bed mobility and stood with CGA/Poli using RW.  Pt declined any further therapy after standing trials 2/2 B hip pain.  Pt with poor tolerance of therapy. Continue OT services to address functional goals, progressing as able.

## 2022-07-11 NOTE — SUBJECTIVE & OBJECTIVE
Hospital problems/plan of care as per primary team:   Sepsis  This patient does have evidence of infective focus  Overall impression is sepsis. Vital signs were reviewed and noted in progress note.  Antibiotics given-              Antibiotics (From admission, onward)                            Start     Stop Route Frequency Ordered     07/07/22 1000   cefTRIAXone (ROCEPHIN) 1 g/50 mL D5W IVPB         -- IV Every 24 hours (non-standard times) 07/07/22 0852          Cultures were taken-            Microbiology Results (last 7 days)      Procedure Component Value Units Date/Time     Blood culture [[id number]] Collected: 07/07/22 1734     Order Status: Completed Specimen: Blood Updated: 07/10/22 0612       Blood Culture, Routine No Growth to date         No Growth to date         No Growth to date     Narrative:       Collection has been rescheduled by [email or web address] at 07/07/2022 15:04 Reason:   Unable to collect both sets of cultures  Collection has been rescheduled by [email or web address] at 07/07/2022 15:04 Reason:   Unable to collect both sets of cultures     Blood culture [[id number]] Collected: 07/07/22 1502     Order Status: Completed Specimen: Blood Updated: 07/10/22 0612       Blood Culture, Routine No Growth to date         No Growth to date         No Growth to date     Blood culture [[id number]] Collected: 07/04/22 1008     Order Status: Completed Specimen: Blood Updated: 07/09/22 1422       Blood Culture, Routine No growth after 5 days.     Blood culture [[id number]] Collected: 07/04/22 1008     Order Status: Completed Specimen: Blood Updated: 07/09/22 1422       Blood Culture, Routine No growth after 5 days.     Blood culture #1 **CANNOT BE ORDERED STAT** [[id number]] Collected: 07/02/22 2213     Order Status: Completed Specimen: Blood Updated: 07/08/22 1212       Blood Culture, Routine No growth after 5 days.     Blood culture [[id number]]       Order Status: Sent Specimen: Blood       Blood culture #2 **CANNOT BE ORDERED STAT**  [[id number]]  (Abnormal)  (Susceptibility) Collected: 07/02/22 2213     Order Status: Completed Specimen: Blood Updated: 07/06/22 1011       Blood Culture, Routine Gram stain aer bottle: Gram negative rods          Results called to and read back by: Renee Lancaster RN  07/04/2022  01:29         ESCHERICHIA COLI     Urine culture [[id number]]  (Abnormal)  (Susceptibility) Collected: 07/02/22 1948     Order Status: Completed Specimen: Urine Updated: 07/05/22 0923       Urine Culture, Routine ESCHERICHIA COLI  >100,000 cfu/ml       Narrative:       Specimen Source->Urine          Latest lactate reviewed, they are-  No results for input(s): LACTATE in the last 72 hours.     Organ dysfunction indicated by Acute kidney injury and Encephalopathy   Source- Urine     Source control Achieved by- Antibiotics for UTI coverage.      Continue meropenem given history of ESBL  Given 2L bolus in ER; will continue with NS at 100 ml/hr given CHF history  Repeat LA and troponin   Resolved              Bacteremia  Blood culture 1 tube growing GNR  Possible seeding from bladder  Cont meropenem  Repeat blood cultures-NGTD  Now on IV rocephin  ID consulted-will need 2 wk course IV rocephin; start date 7/2        Non-traumatic rhabdomyolysis  CK 1842  FLORECITA possibly a complication of rhabdo, although CK not significantly elevated  Received fluid bolus due to sepsis  Hold further fluid due to HF  Trend CK-slowly decreasing  Gentle fluids overnight and reassess in am  Continued improvement        Lactic acidosis  Due to sepsis  Improving        NSTEMI (non-ST elevated myocardial infarction)  Trop trending down. No EKG changes or c/o chest pain  TTE reviewed.  Cardiology consulted           Chronic diastolic (congestive) heart failure  No signs of decompensation  Cont metoprolol  TTE showing grade I diastolic dysfunction  Monitor I&O            FLORECITA (acute kidney injury)        Lab Results   Component Value Date     CREATININE 0.9 07/10/2022      Sr  Cr elevated secondary to sepsis  Cont to monitor with daily labs   Avoid nephrotoxins.   Renally dose all medications   Monitor events that may lead to decreased renal perfusion (hypovolemia, hypotension, sepsis).   Monitor urine output (goal 0.5 mL/kg/hr) to assure that no obstruction precipitates worsening in GFR.  Serum bicarb level 19. Anticipate improvement with sepsis management.   FLORECITA possibly due to sepsis vs rhabdo  Gentle IVFH-improving  resolved        HLD (hyperlipidemia)  Continue atorvastatin      Anemia of chronic disease  Patient's anemia is currently stable. S/p 0 units of PRBCs.  Current CBC reviewed-         Lab Results   Component Value Date     HGB 8.9 (L) 07/10/2022     HCT 29.7 (L) 07/10/2022     MCV 86 07/10/2022      07/10/2022      Monitor serial CBC and transfuse if patient becomes hemodynamically unstable, symptomatic or H/H drops below 7/21.            Essential hypertension  Chronic, improved.  Latest blood pressure and vitals reviewed-   Temp:  [98.2 °F (36.8 °C)-100.3 °F (37.9 °C)]   Pulse:  [86-96]   Resp:  [14-20]   BP: (129-153)/(60-69)   SpO2:  [94 %-97 %] .   Home meds for hypertension were reviewed and noted below.         Hypertension Medications                 losartan (COZAAR) 100 MG tablet TAKE 1 TABLET (100 MG TOTAL) BY MOUTH ONCE DAILY.     metoprolol succinate (TOPROL-XL) 25 MG 24 hr tablet Take 0.5 tablets (12.5 mg total) by mouth once daily.             While in the hospital, will manage blood pressure as follows; Adjust home antihypertensive regimen as follows- losartan held due to FLORECITA.  FLORECITA better; however, amlodipine was started.  Increase to amlodipine 10mg daily.  Will increase beta blocker as she has been tachycardic as well.  Monitor closely and adjust as necessary.     Will utilize p.r.n. blood pressure medication only if patient's blood pressure greater than  180/110 and she develops symptoms such as worsening chest pain or shortness of breath.  BP has  improved.           Acute cystitis with hematuria  Urine culture from 5/15/22 growing ESBL Klebsiella  Meropenem  Urine culture growing GNR, e coli  Febrile overnight.  Continue IV abx and monitor  Continued fevers. ID consult   Now on IV Rocephin as per sensitivity-will need 2 wk course        Metastatic carcinoid tumor  ER physician discussed the case with Dr. Perez with neuroendocrine services  Continue follow up outpatient as scheduled       Past Medical History:   Diagnosis Date    Allergy     Arthritis     Cataract     Chronic diastolic (congestive) heart failure     Colon cancer     Encounter for blood transfusion     History of ESBL E. 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)        Past Surgical History:   Procedure Laterality Date    ABDOMINAL SURGERY      CATARACT EXTRACTION Left 10/2017     SECTION      CHOLECYSTECTOMY      COLON SURGERY      cystoscope      CYSTOSCOPY W/ RETROGRADES Right 10/10/2019    Procedure: CYSTOSCOPY, WITH RETROGRADE PYELOGRAM;  Surgeon: Gen Isbell MD;  Location: [facility];  Service: Urology;  Laterality: Right;    ERCP N/A 2021    Procedure: ERCP (ENDOSCOPIC RETROGRADE CHOLANGIOPANCREATOGRAPHY);  Surgeon: Jayjay Rivera MD;  Location: [address]);  Service: Endoscopy;  Laterality: N/A;    ERCP N/A 3/4/2022    Procedure: ERCP (ENDOSCOPIC RETROGRADE CHOLANGIOPANCREATOGRAPHY);  Surgeon: Roby Virgen MD;  Location: [address]);  Service: Endoscopy;  Laterality: N/A;    EYE SURGERY      HYSTERECTOMY  1996    LITHOTRIPSY      LIVER BIOPSY      carcinoid    URETEROSCOPY Right 10/10/2019    Procedure: URETEROSCOPY;  Surgeon: Gen Isbell MD;  Location: [facility];  Service: Urology;   Laterality: Right;    UTERINE FIBROID SURGERY         Review of patient's allergies indicates:   Allergen Reactions    Contrast media Hives, Itching and Swelling    Epinephrine Anaphylaxis     Can cause  a Carcinoid Crisis    Ibuprofen Hives, Itching and Swelling    Zofran [ondansetron hcl] Itching     And multiple other reactions    Iodinated contrast media     Morphine Other (See Comments)    Sulfa (sulfonamide antibiotics) Hives, Itching and Swelling       Medications:  Continuous Infusions:  Scheduled Meds:   amLODIPine  10 mg Oral Daily    cefTRIAXone (ROCEPHIN) IVPB  1 g Intravenous Q24H    cyanocobalamin  1,000 mcg Oral Daily    gabapentin  300 mg Oral QHS    heparin (porcine)  5,000 Units Subcutaneous Q8H    metoprolol succinate  25 mg Oral BID    miconazole NITRATE 2 %   Topical (Top) BID    pantoprazole  40 mg Oral Daily    senna-docusate 8.6-50 mg  1 tablet Oral BID     PRN Meds:acetaminophen, albuterol-ipratropium, aluminum-magnesium hydroxide-simethicone, baclofen, cloNIDine, dextrose 10%, dextrose 10%, dicyclomine, glucagon (human recombinant), glucose, glucose, hydrALAZINE, insulin aspart U-100, meclizine, melatonin, methyl salicylate-menthol 15-10%, metoclopramide HCl, naloxone, oxyCODONE, oxyCODONE, simethicone, sodium chloride 0.9%, sodium chloride 0.9%    Family History       Problem Relation (Age of Onset)    Alzheimer's disease Father    Cancer Mother    No Known Problems Son, Son, Son, Son    Stroke Sister          Tobacco Use    Smoking status: Never Smoker    Smokeless tobacco: Never Used   Substance and Sexual Activity    Alcohol use: No     Alcohol/week: 0.0 standard drinks    Drug use: No    Sexual activity: Not Currently       Review of Systems   Constitutional:  Positive for activity change.   HENT:  Negative for congestion, sinus pain and trouble swallowing.    Eyes:  Negative for pain and discharge.   Respiratory:  Negative for shortness of breath.    Cardiovascular:  Negative for  chest pain.   Gastrointestinal:  Positive for abdominal pain.   Endocrine: Negative for cold intolerance and heat intolerance.   Musculoskeletal:  Negative for neck pain and neck stiffness.   Skin:  Negative for color change.   Allergic/Immunologic: Negative for environmental allergies and food allergies.   Neurological:  Positive for weakness.   Psychiatric/Behavioral:  Negative for behavioral problems.    Objective:     Vital Signs (Most Recent):  Temp: 98.3 °F (36.8 °C) (07/11/22 0736)  Pulse: 89 (07/11/22 0736)  Resp: 18 (07/11/22 0736)  BP: (!) 148/66 (07/11/22 0736)  SpO2: 97 % (07/11/22 0807)   Vital Signs (24h Range):  Temp:  [98.2 °F (36.8 °C)-99.5 °F (37.5 °C)] 98.3 °F (36.8 °C)  Pulse:  [84-91] 89  Resp:  [14-20] 18  SpO2:  [94 %-97 %] 97 %  BP: (129-148)/(60-66) 148/66     Weight: 79.6 kg (175 lb 7.8 oz)  Body mass index is 28.32 kg/m².    Physical Exam  Vitals and nursing note reviewed.   Constitutional:       General: She is not in acute distress.     Appearance: She is not ill-appearing.   HENT:      Head: Normocephalic and atraumatic.      Right Ear: External ear normal.      Left Ear: External ear normal.      Nose: Nose normal. No congestion or rhinorrhea.   Eyes:      General:         Right eye: No discharge.         Left eye: No discharge.   Cardiovascular:      Rate and Rhythm: Normal rate.   Pulmonary:      Effort: Pulmonary effort is normal. No respiratory distress.   Abdominal:      General: Abdomen is flat.   Musculoskeletal:      Cervical back: Normal range of motion and neck supple.   Skin:     General: Skin is warm and dry.   Neurological:      Mental Status: She is alert and oriented to person, place, and time.   Psychiatric:         Mood and Affect: Mood normal.         Behavior: Behavior normal.       Review of Symptoms      Symptom Assessment (ESAS 0-10 Scale)  Pain:  0  Dyspnea:  0  Anxiety:  0  Nausea:  0  Depression:  0  Anorexia:  0  Fatigue:  0  Insomnia:  0  Restlessness:   0  Agitation:  0       Living Arrangements:  Lives alone and Lives in home    Psychosocial/Cultural: Patient lives alone.  , has four sons.       Advance Care Planning   Advance Directives:   Living Will: No    LaPOST: No    Do Not Resuscitate Status: No    Medical Power of : No      Decision Making:  Patient answered questions       Significant Labs: All pertinent labs within the past 24 hours have been reviewed.  CBC:   Recent Labs   Lab 07/10/22  0519   WBC 3.50*   HGB 8.9*   HCT 29.7*   MCV 86        BMP:  Recent Labs   Lab 07/11/22  0801         K 3.7      CO2 23   BUN 18   CREATININE 0.8   CALCIUM 8.0*     LFT:  Lab Results   Component Value Date    AST 23 07/06/2022    ALKPHOS 88 07/06/2022    BILITOT 0.5 07/06/2022     Albumin:   Albumin   Date Value Ref Range Status   07/06/2022 2.3 (L) 3.5 - 5.2 g/dL Final   05/07/2018 3.3  Final     Protein:   Total Protein   Date Value Ref Range Status   07/06/2022 5.4 (L) 6.0 - 8.4 g/dL Final     Lactic acid:   Lab Results   Component Value Date    LACTATE 1.2 07/03/2022    LACTATE 2.1 07/03/2022       Significant Imaging: I have reviewed all pertinent imaging results/findings within the past 24 hours.    X-Ray Chest AP Portable  Narrative: EXAMINATION:  XR CHEST AP PORTABLE    CLINICAL HISTORY:  Fevers;    TECHNIQUE:  Single frontal view of the chest was performed.    COMPARISON:  Radiograph 07/02/2022.    FINDINGS:  Cardiac monitoring leads project over the bilateral hemithoraces.  Mediastinal structures are midline.  Hilar contours are unremarkable.  Cardiac silhouette is normal in size.  Lung volumes are symmetric no consolidation.  No pneumothorax or pleural effusion.  No free air beneath the diaphragm.  No acute osseous abnormalities.  Visualized soft tissues are within normal limits.  Impression: 1. No acute radiographic findings in the chest on this single view.    Electronically signed by: Ayden Cates  MD  Date:    07/09/2022  Time:    07:44    Cardiology:  most recent ECHO report 7/4/22 reviewed

## 2022-07-11 NOTE — PLAN OF CARE
Suzie attempted to call Brandee [phone] with [facility]; no answer noted. Sw left voicemail. Sw will follow up later.     Awaiting insurance auth for [facility].     Suzie will continue to follow pt for d/c planning.     Future Appointments   Date Time Provider Department Center   7/15/2022  8:00 AM Amy Bright NP [address]   9/1/2022  9:20 AM Ervin Parikh MD [facility]OLOG[facility]   9/21/2022 10:40 AM DO TARAH Savage [facility] [place]         07/11/22 1605   Post-Acute Status   Post-Acute Authorization Placement   Discharge Plan   Discharge Plan A Skilled Nursing Facility

## 2022-07-11 NOTE — ASSESSMENT & PLAN NOTE
This patient does have evidence of infective focus  My overall impression is sepsis. Vital signs were reviewed and noted in progress note.  Antibiotics given-   Antibiotics (From admission, onward)            Start     Stop Route Frequency Ordered    07/07/22 1000  cefTRIAXone (ROCEPHIN) 1 g/50 mL D5W IVPB         -- IV Every 24 hours (non-standard times) 07/07/22 0852        Cultures were taken-   Microbiology Results (last 7 days)     Procedure Component Value Units Date/Time    Blood culture [[id number]] Collected: 07/07/22 1734    Order Status: Completed Specimen: Blood Updated: 07/10/22 0612     Blood Culture, Routine No Growth to date      No Growth to date      No Growth to date    Narrative:      Collection has been rescheduled by [email or web address] at 07/07/2022 15:04 Reason:   Unable to collect both sets of cultures  Collection has been rescheduled by [email or web address] at 07/07/2022 15:04 Reason:   Unable to collect both sets of cultures    Blood culture [[id number]] Collected: 07/07/22 1502    Order Status: Completed Specimen: Blood Updated: 07/10/22 0612     Blood Culture, Routine No Growth to date      No Growth to date      No Growth to date    Blood culture [[id number]] Collected: 07/04/22 1008    Order Status: Completed Specimen: Blood Updated: 07/09/22 1422     Blood Culture, Routine No growth after 5 days.    Blood culture [[id number]] Collected: 07/04/22 1008    Order Status: Completed Specimen: Blood Updated: 07/09/22 1422     Blood Culture, Routine No growth after 5 days.    Blood culture #1 **CANNOT BE ORDERED STAT** [[id number]] Collected: 07/02/22 2213    Order Status: Completed Specimen: Blood Updated: 07/08/22 1212     Blood Culture, Routine No growth after 5 days.    Blood culture [[id number]]     Order Status: Sent Specimen: Blood     Blood culture #2 **CANNOT BE ORDERED STAT** [[id number]]  (Abnormal)  (Susceptibility) Collected: 07/02/22 2213    Order Status: Completed Specimen: Blood Updated: 07/06/22 1011     Blood  Culture, Routine Gram stain aer bottle: Gram negative rods       Results called to and read back by: Renee Lancaster RN  07/04/2022  01:29      ESCHERICHIA COLI    Urine culture [[id number]]  (Abnormal)  (Susceptibility) Collected: 07/02/22 1948    Order Status: Completed Specimen: Urine Updated: 07/05/22 0923     Urine Culture, Routine ESCHERICHIA COLI  >100,000 cfu/ml      Narrative:      Specimen Source->Urine        Latest lactate reviewed, they are-  No results for input(s): LACTATE in the last 72 hours.    Organ dysfunction indicated by Acute kidney injury and Encephalopathy   Source- Urine    Source control Achieved by- Antibiotics for UTI coverage.     Continue meropenem given history of ESBL  Given 2L bolus in ER; will continue with NS at 100 ml/hr given CHF history  Repeat LA and troponin   Resolved

## 2022-07-11 NOTE — PROGRESS NOTES
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 She has also noticed abd pain, n/v, increased urinary frequency/urgency and fell at home last night but denies LOC. Pt denies chest pain, sob, palpitations, diarrhea, constipation. Was prescribed keflex yesterday for UTI. Pt noted to be very somnolent and minimally conversant upon arrival to ED. Workup significant for +UA, leukocytosis, lactic acidosis, FLORECITA, elevated troponin. CT abd/pelvis with no acute findings; unchanged size of carcinoid tumor with liver mets and lymphadenopathy. Started on meropenem for UTI due to recent urine culture growing ESBL klebsiella. Mental status has since improved and patient now alert and oriented x4. Patient will be admitted to hospital medicine service for further management.       Overview/Hospital Course:  Patito Domingo was monitored closely during her hospitalization.  She was admitted with a diagnosis of sepsis 2/2 UTI, FLORECITA and non-traumatic rhabdomyolysis.  Urine and blood cultures were obtained and she was placed on IV meropenem.  Her admitting troponin was elevated and cardiology was consulted.  An echocardiogram was obtained revealing normal ejection fraction with normal wall motion.  Her troponin trended down.  She was given gentle IVF and her FLORECITA as well as rhabdomyolysis improved.  Her blood culture was positive for GNR, and repeat cultures were drawn.  Urine culture was positive for ESCHERICHIA COLI, sensitive to Rocephin and abx were  de-escalated from meropenem to IV rocephin.  PT/OT were consulted.  Pt c/o right hip pain so an xray was obtained which was negative for fracture.  She c/o swelling to RLE.  An ultrasound of the BLE was performed which showed no evidence of DVT.  She developed intermittent low grade fevers despite abx therapy and ID was consulted.  Per ID, presence of large bilateral renal stones complicates picture, although non-obstructive.  Previously assessed by urology, but decided against stone retrieval due to risks of percutaneous nephrolithotomy, and stones not clearly causing UTIs.  ID recommendations are for IV rocephin 1gm q24h x 2 weeks, start date 7/2/22.  Plan DC to SNF with PICC or midline for continued abx treatment.   to arrange.      Interval History: fever curve improved; BP much improved.  Could likely dc to SNF tomorrow if remains afebrile.  Replete K and Mag.    Review of Systems   Constitutional:  Positive for activity change. Negative for appetite change, chills, diaphoresis, fatigue and fever.   HENT:  Negative for congestion, postnasal drip, rhinorrhea, sinus pressure, sinus pain and sneezing.    Respiratory:  Negative for cough, chest tightness, shortness of breath, wheezing and stridor.    Cardiovascular:  Negative for chest pain, palpitations and leg swelling.   Gastrointestinal:  Negative for abdominal distention, abdominal pain, blood in stool, constipation, diarrhea, nausea and vomiting.   Endocrine: Negative for cold intolerance and heat intolerance.   Genitourinary:  Negative for dysuria, flank pain, frequency, hematuria and urgency.   Musculoskeletal:  Negative for gait problem.   Neurological:  Positive for weakness (generalized). Negative for dizziness.   Psychiatric/Behavioral:  Negative for agitation and behavioral problems.    All other systems reviewed and are negative.  Objective:     Vital Signs (Most Recent):  Temp: 98.2 °F (36.8 °C) (07/10/22 1947)  Pulse: 91 (07/10/22  1947)  Resp: 16 (07/10/22 1947)  BP: 129/61 (07/10/22 1947)  SpO2: (!) 94 % (07/10/22 1951)   Vital Signs (24h Range):  Temp:  [98.2 °F (36.8 °C)-100.3 °F (37.9 °C)] 98.2 °F (36.8 °C)  Pulse:  [86-96] 91  Resp:  [14-20] 16  SpO2:  [94 %-97 %] 94 %  BP: (129-153)/(60-69) 129/61     Weight: 84.2 kg (185 lb 10 oz)  Body mass index is 29.96 kg/m².    Intake/Output Summary (Last 24 hours) at 7/10/2022 2232  Last data filed at 7/10/2022 1740  Gross per 24 hour   Intake 735.26 ml   Output 500 ml   Net 235.26 ml      Physical Exam  Constitutional:       General: She is not in acute distress.     Appearance: She is well-developed. She is not ill-appearing or toxic-appearing.   HENT:      Head: Normocephalic and atraumatic.   Neck:      Vascular: No JVD.   Cardiovascular:      Rate and Rhythm: Tachycardia present.   Pulmonary:      Effort: Pulmonary effort is normal. No respiratory distress.   Abdominal:      Tenderness: Tenderness: diffuse.   Musculoskeletal:         General: Normal range of motion.      Cervical back: Normal range of motion and neck supple.   Neurological:      General: No focal deficit present.      Mental Status: She is alert and oriented to person, place, and time. Mental status is at baseline.      Cranial Nerves: No cranial nerve deficit.   Psychiatric:         Mood and Affect: Mood normal.         Behavior: Behavior normal.         Thought Content: Thought content normal.       Significant Labs: All pertinent labs within the past 24 hours have been reviewed.  CBC:   Recent Labs   Lab 07/10/22  0519   WBC 3.50*   HGB 8.9*   HCT 29.7*        CMP:   Recent Labs   Lab 07/10/22  0519      K 3.0*      CO2 22*      BUN 18   CREATININE 0.9   CALCIUM 8.4*   ANIONGAP 13   EGFRNONAA >60       Significant Imaging: I have reviewed all pertinent imaging results/findings within the past 24 hours.      Assessment/Plan:      * Sepsis  This patient does have evidence of infective focus  My  overall impression is sepsis. Vital signs were reviewed and noted in progress note.  Antibiotics given-   Antibiotics (From admission, onward)            Start     Stop Route Frequency Ordered    07/07/22 1000  cefTRIAXone (ROCEPHIN) 1 g/50 mL D5W IVPB         -- IV Every 24 hours (non-standard times) 07/07/22 0852        Cultures were taken-   Microbiology Results (last 7 days)     Procedure Component Value Units Date/Time    Blood culture [[id number]] Collected: 07/07/22 1734    Order Status: Completed Specimen: Blood Updated: 07/10/22 0612     Blood Culture, Routine No Growth to date      No Growth to date      No Growth to date    Narrative:      Collection has been rescheduled by [email or web address] at 07/07/2022 15:04 Reason:   Unable to collect both sets of cultures  Collection has been rescheduled by [email or web address] at 07/07/2022 15:04 Reason:   Unable to collect both sets of cultures    Blood culture [[id number]] Collected: 07/07/22 1502    Order Status: Completed Specimen: Blood Updated: 07/10/22 0612     Blood Culture, Routine No Growth to date      No Growth to date      No Growth to date    Blood culture [[id number]] Collected: 07/04/22 1008    Order Status: Completed Specimen: Blood Updated: 07/09/22 1422     Blood Culture, Routine No growth after 5 days.    Blood culture [[id number]] Collected: 07/04/22 1008    Order Status: Completed Specimen: Blood Updated: 07/09/22 1422     Blood Culture, Routine No growth after 5 days.    Blood culture #1 **CANNOT BE ORDERED STAT** [[id number]] Collected: 07/02/22 2213    Order Status: Completed Specimen: Blood Updated: 07/08/22 1212     Blood Culture, Routine No growth after 5 days.    Blood culture [[id number]]     Order Status: Sent Specimen: Blood     Blood culture #2 **CANNOT BE ORDERED STAT** [[id number]]  (Abnormal)  (Susceptibility) Collected: 07/02/22 2213    Order Status: Completed Specimen: Blood Updated: 07/06/22 1011     Blood Culture, Routine Gram stain aer bottle: Gram negative  rods       Results called to and read back by: Renee Lancaster RN  07/04/2022  01:29      ESCHERICHIA COLI    Urine culture [[id number]]  (Abnormal)  (Susceptibility) Collected: 07/02/22 1948    Order Status: Completed Specimen: Urine Updated: 07/05/22 0923     Urine Culture, Routine ESCHERICHIA COLI  >100,000 cfu/ml      Narrative:      Specimen Source->Urine        Latest lactate reviewed, they are-  No results for input(s): LACTATE in the last 72 hours.    Organ dysfunction indicated by Acute kidney injury and Encephalopathy   Source- Urine    Source control Achieved by- Antibiotics for UTI coverage.     Continue meropenem given history of ESBL  Given 2L bolus in ER; will continue with NS at 100 ml/hr given CHF history  Repeat LA and troponin   Resolved          Bacteremia  Blood culture 1 tube growing GNR  Possible seeding from bladder  Cont meropenem  Repeat blood cultures-NGTD  Now on IV rocephin  ID consulted-will need 2 wk course IV rocephin; start date 7/2      Non-traumatic rhabdomyolysis  CK 1842  FLORECITA possibly a complication of rhabdo, although CK not significantly elevated  Received fluid bolus due to sepsis  Hold further fluid due to HF  Trend CK-slowly decreasing  Gentle fluids overnight and reassess in am  Continued improvement      Lactic acidosis  Due to sepsis  Improving      NSTEMI (non-ST elevated myocardial infarction)  Trop trending down. No EKG changes or c/o chest pain  TTE reviewed.  Cardiology consulted        Chronic diastolic (congestive) heart failure  No signs of decompensation  Cont metoprolol  TTE showing grade I diastolic dysfunction  Monitor I&O         FLORECITA (acute kidney injury)  Lab Results   Component Value Date    CREATININE 0.9 07/10/2022     Sr Cr elevated secondary to sepsis  Cont to monitor with daily labs   Avoid nephrotoxins.   Renally dose all medications   Monitor events that may lead to decreased renal perfusion (hypovolemia, hypotension, sepsis).   Monitor urine output (goal  0.5 mL/kg/hr) to assure that no obstruction precipitates worsening in GFR.  Serum bicarb level 19. Anticipate improvement with sepsis management.   FLORECITA possibly due to sepsis vs rhabdo  Gentle IVFH-improving  resolved      HLD (hyperlipidemia)  Continue atorvastatin     Anemia of chronic disease  Patient's anemia is currently stable. S/p 0 units of PRBCs.  Current CBC reviewed-   Lab Results   Component Value Date    HGB 8.9 (L) 07/10/2022    HCT 29.7 (L) 07/10/2022    MCV 86 07/10/2022     07/10/2022     Monitor serial CBC and transfuse if patient becomes hemodynamically unstable, symptomatic or H/H drops below 7/21.         Essential hypertension  Chronic, improved.  Latest blood pressure and vitals reviewed-   Temp:  [98.2 °F (36.8 °C)-100.3 °F (37.9 °C)]   Pulse:  [86-96]   Resp:  [14-20]   BP: (129-153)/(60-69)   SpO2:  [94 %-97 %] .   Home meds for hypertension were reviewed and noted below.   Hypertension Medications             losartan (COZAAR) 100 MG tablet TAKE 1 TABLET (100 MG TOTAL) BY MOUTH ONCE DAILY.    metoprolol succinate (TOPROL-XL) 25 MG 24 hr tablet Take 0.5 tablets (12.5 mg total) by mouth once daily.          While in the hospital, will manage blood pressure as follows; Adjust home antihypertensive regimen as follows- losartan held due to FLORECITA.  FLORECITA better; however, amlodipine was started.  Increase to amlodipine 10mg daily.  Will increase beta blocker as she has been tachycardic as well.  Monitor closely and adjust as necessary.    Will utilize p.r.n. blood pressure medication only if patient's blood pressure greater than  180/110 and she develops symptoms such as worsening chest pain or shortness of breath.  BP has improved.        Acute cystitis with hematuria  Urine culture from 5/15/22 growing ESBL Klebsiella  Meropenem  Urine culture growing GNR, e coli  Febrile overnight.  Continue IV abx and monitor  Continued fevers. ID consult   Now on IV Rocephin as per sensitivity-will need 2  wk course      Metastatic carcinoid tumor  ER physician discussed the case with Dr. Perez with neuroendocrine services  Continue follow up outpatient as scheduled       VTE Risk Mitigation (From admission, onward)         Ordered     heparin (porcine) injection 5,000 Units  Every 8 hours         07/03/22 0058     IP VTE HIGH RISK PATIENT  Once         07/03/22 0058     Place sequential compression device  Until discontinued         07/03/22 0058                      I have assessed these finding virtually using telemed platform and with assistance of bedside nurse                 The attending portion of this evaluation, treatment, and documentation was performed per BERENICE Cook via Telemedicine AudioVisual using the secure [employer] software platform with 2 way audio/video. The provider was located off-site and the patient is located in the hospital. The aforementioned video software was utilized to document the relevant history and physical exam    BERENICE Cook  Department of Hospital Medicine   [facility]

## 2022-07-11 NOTE — CONSULTS
[facility]  Palliative Medicine  Consult Note    Patient Name: Patito Domingo  MRN: [id number]  Admission Date: 7/2/2022  Hospital Length of Stay: 9 days  Code Status: Full Code   Attending Provider: Naya Calix MD  Consulting Provider: Cris Houston NP  Primary Care Physician: Mariela Wei DO  Principal Problem:Sepsis    Patient information was obtained from patient, past medical records and primary team.      Inpatient consult to Palliative Care  Consult performed by: Cris Houston NP  Consult ordered by: BERENICE Hodgson  Reason for consult: goals of care/advance care planning        Assessment/Plan:     Palliative care encounter  Palliative medicine consult received for goals of care/advance care planning.  Per chart review, patient does not have any advance directives on file, currently full code status. Palliative services introduced to patient and she was receptive to visit. She is alert, oriented, appears comfortable, denies current complaints.   We discussed medical history, which includes 10 hospital encounters in the last 6 months, as well as current medical status and goals of care.      -Pt elects to remain full code/full treatment status  -She does not have advance directives.  She is  and has 4 sons. If she becomes unable to make medical decisions for herself, she would want them to share surrogate decision making responsibility  -Her primary goal at this time is to regain strength to be able to go home, although she does mention that she is lonely at home.She understands that she is high-risk for readmission based on hospitalizations required over the last 6 months but feels that hospital encounters are not affecting her quality of life.   She is satisfied with home-based palliative care and would like for that to continue after SNF.   -Her goals include to regain strength and to extend life regardless of treatment burden. She confirms that she does want to  continue cancer treatments.         Thank you for your consult. I will follow-up with patient. Please contact us if you have any additional questions.    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 She has also noticed abd pain, n/v, increased urinary frequency/urgency and fell at home last night but denies LOC. Pt denied chest pain, sob, palpitations, diarrhea, constipation. Was prescribed keflex yesterday for UTI. Pt noted to be very somnolent and minimally conversant upon arrival to ED. Workup significant for +UA, leukocytosis, lactic acidosis, FLORECITA, elevated troponin. CT abd/pelvis with no acute findings; unchanged size of carcinoid tumor with liver mets and lymphadenopathy. Started on meropenem for UTI due to recent urine culture growing ESBL klebsiella. Mental status has since improved and patient was alert and oriented x4. She was admitted with a diagnosis of sepsis 2/2 UTI, FLORECITA and non-traumatic rhabdomyolysis.  Urine and blood cultures were obtained and she was placed on IV meropenem.  Her admitting troponin was elevated and cardiology was consulted.  An echocardiogram was obtained revealing normal ejection fraction with normal wall motion.  Her troponin trended down.  She was given gentle IVF and her FLORECITA as well as rhabdomyolysis improved.  Her blood culture was positive for GNR, and repeat cultures were drawn.  Urine culture was positive for ESCHERICHIA COLI, sensitive to Rocephin and abx were de-escalated from meropenem to IV rocephin.  PT/OT were consulted.  Pt c/o right hip pain so an xray was obtained which was negative for fracture.  She c/o swelling to RLE.  An ultrasound of the BLE was performed which showed no evidence of DVT.  She developed intermittent low grade fevers despite abx therapy and ID  was consulted.  Per ID, presence of large bilateral renal stones complicates picture, although non-obstructive.  Previously assessed by urology, but decided against stone retrieval due to risks of percutaneous nephrolithotomy, and stones not clearly causing UTIs.  ID recommendations are for IV rocephin 1gm q24h x 2 weeks, start date 7/2/22.  Plan DC to SNF with PICC or midline for continued abx treatment.   to arrange.    Palliative medicine was consulted for goals of care/advance care planning.      Hospital Course:  No notes on file    Hospital problems/plan of care as per primary team:   Sepsis  This patient does have evidence of infective focus  Overall impression is sepsis. Vital signs were reviewed and noted in progress note.  Antibiotics given-              Antibiotics (From admission, onward)                            Start     Stop Route Frequency Ordered     07/07/22 1000   cefTRIAXone (ROCEPHIN) 1 g/50 mL D5W IVPB         -- IV Every 24 hours (non-standard times) 07/07/22 0852          Cultures were taken-            Microbiology Results (last 7 days)      Procedure Component Value Units Date/Time     Blood culture [[id number]] Collected: 07/07/22 1734     Order Status: Completed Specimen: Blood Updated: 07/10/22 0612       Blood Culture, Routine No Growth to date         No Growth to date         No Growth to date     Narrative:       Collection has been rescheduled by [email or web address] at 07/07/2022 15:04 Reason:   Unable to collect both sets of cultures  Collection has been rescheduled by [email or web address] at 07/07/2022 15:04 Reason:   Unable to collect both sets of cultures     Blood culture [[id number]] Collected: 07/07/22 1502     Order Status: Completed Specimen: Blood Updated: 07/10/22 0612       Blood Culture, Routine No Growth to date         No Growth to date         No Growth to date     Blood culture [[id number]] Collected: 07/04/22 1008     Order Status: Completed Specimen: Blood Updated: 07/09/22 1422        Blood Culture, Routine No growth after 5 days.     Blood culture [[id number]] Collected: 07/04/22 1008     Order Status: Completed Specimen: Blood Updated: 07/09/22 1422       Blood Culture, Routine No growth after 5 days.     Blood culture #1 **CANNOT BE ORDERED STAT** [[id number]] Collected: 07/02/22 2213     Order Status: Completed Specimen: Blood Updated: 07/08/22 1212       Blood Culture, Routine No growth after 5 days.     Blood culture [[id number]]       Order Status: Sent Specimen: Blood       Blood culture #2 **CANNOT BE ORDERED STAT** [[id number]]  (Abnormal)  (Susceptibility) Collected: 07/02/22 2213     Order Status: Completed Specimen: Blood Updated: 07/06/22 1011       Blood Culture, Routine Gram stain aer bottle: Gram negative rods          Results called to and read back by: Renee Lancaster RN  07/04/2022  01:29         ESCHERICHIA COLI     Urine culture [[id number]]  (Abnormal)  (Susceptibility) Collected: 07/02/22 1948     Order Status: Completed Specimen: Urine Updated: 07/05/22 0923       Urine Culture, Routine ESCHERICHIA COLI  >100,000 cfu/ml       Narrative:       Specimen Source->Urine          Latest lactate reviewed, they are-  No results for input(s): LACTATE in the last 72 hours.     Organ dysfunction indicated by Acute kidney injury and Encephalopathy   Source- Urine     Source control Achieved by- Antibiotics for UTI coverage.      Continue meropenem given history of ESBL  Given 2L bolus in ER; will continue with NS at 100 ml/hr given CHF history  Repeat LA and troponin   Resolved              Bacteremia  Blood culture 1 tube growing GNR  Possible seeding from bladder  Cont meropenem  Repeat blood cultures-NGTD  Now on IV rocephin  ID consulted-will need 2 wk course IV rocephin; start date 7/2        Non-traumatic rhabdomyolysis  CK 1842  FLORECITA possibly a complication of rhabdo, although CK not significantly elevated  Received fluid bolus due to sepsis  Hold further fluid due to HF  Trend  CK-slowly decreasing  Gentle fluids overnight and reassess in am  Continued improvement        Lactic acidosis  Due to sepsis  Improving        NSTEMI (non-ST elevated myocardial infarction)  Trop trending down. No EKG changes or c/o chest pain  TTE reviewed.  Cardiology consulted           Chronic diastolic (congestive) heart failure  No signs of decompensation  Cont metoprolol  TTE showing grade I diastolic dysfunction  Monitor I&O            FLORECITA (acute kidney injury)        Lab Results   Component Value Date     CREATININE 0.9 07/10/2022      Sr Cr elevated secondary to sepsis  Cont to monitor with daily labs   Avoid nephrotoxins.   Renally dose all medications   Monitor events that may lead to decreased renal perfusion (hypovolemia, hypotension, sepsis).   Monitor urine output (goal 0.5 mL/kg/hr) to assure that no obstruction precipitates worsening in GFR.  Serum bicarb level 19. Anticipate improvement with sepsis management.   FLORECITA possibly due to sepsis vs rhabdo  Gentle IVFH-improving  resolved        HLD (hyperlipidemia)  Continue atorvastatin      Anemia of chronic disease  Patient's anemia is currently stable. S/p 0 units of PRBCs.  Current CBC reviewed-         Lab Results   Component Value Date     HGB 8.9 (L) 07/10/2022     HCT 29.7 (L) 07/10/2022     MCV 86 07/10/2022      07/10/2022      Monitor serial CBC and transfuse if patient becomes hemodynamically unstable, symptomatic or H/H drops below 7/21.            Essential hypertension  Chronic, improved.  Latest blood pressure and vitals reviewed-   Temp:  [98.2 °F (36.8 °C)-100.3 °F (37.9 °C)]   Pulse:  [86-96]   Resp:  [14-20]   BP: (129-153)/(60-69)   SpO2:  [94 %-97 %] .   Home meds for hypertension were reviewed and noted below.         Hypertension Medications                 losartan (COZAAR) 100 MG tablet TAKE 1 TABLET (100 MG TOTAL) BY MOUTH ONCE DAILY.     metoprolol succinate (TOPROL-XL) 25 MG 24 hr tablet Take 0.5 tablets (12.5 mg  total) by mouth once daily.             While in the hospital, will manage blood pressure as follows; Adjust home antihypertensive regimen as follows- losartan held due to FLORECITA.  FLORECITA better; however, amlodipine was started.  Increase to amlodipine 10mg daily.  Will increase beta blocker as she has been tachycardic as well.  Monitor closely and adjust as necessary.     Will utilize p.r.n. blood pressure medication only if patient's blood pressure greater than  180/110 and she develops symptoms such as worsening chest pain or shortness of breath.  BP has improved.           Acute cystitis with hematuria  Urine culture from 5/15/22 growing ESBL Klebsiella  Meropenem  Urine culture growing GNR, e coli  Febrile overnight.  Continue IV abx and monitor  Continued fevers. ID consult   Now on IV Rocephin as per sensitivity-will need 2 wk course        Metastatic carcinoid tumor  ER physician discussed the case with Dr. Perez with neuroendocrine services  Continue follow up outpatient as scheduled       Past Medical History:   Diagnosis Date    Allergy     Arthritis     Cataract     Chronic diastolic (congestive) heart failure     Colon cancer     Encounter for blood transfusion     History of ESBL E. 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)        Past Surgical History:   Procedure Laterality Date    ABDOMINAL SURGERY      CATARACT EXTRACTION Left 10/2017     SECTION      CHOLECYSTECTOMY      COLON SURGERY      cystoscope      CYSTOSCOPY W/ RETROGRADES Right 10/10/2019    Procedure: CYSTOSCOPY, WITH RETROGRADE PYELOGRAM;  Surgeon: Gen Isbell MD;  Location: [facility] OR;  Service: Urology;  Laterality: Right;    ERCP N/A 2021    Procedure:  ERCP (ENDOSCOPIC RETROGRADE CHOLANGIOPANCREATOGRAPHY);  Surgeon: Jayjay Rivera MD;  Location: [facility] ENDO (2ND FLR);  Service: Endoscopy;  Laterality: N/A;    ERCP N/A 3/4/2022    Procedure: ERCP (ENDOSCOPIC RETROGRADE CHOLANGIOPANCREATOGRAPHY);  Surgeon: Roby Virgen MD;  Location: [facility] ENDO (2ND FLR);  Service: Endoscopy;  Laterality: N/A;    EYE SURGERY      HYSTERECTOMY  5/1996    LITHOTRIPSY      LIVER BIOPSY  9/14    carcinoid    URETEROSCOPY Right 10/10/2019    Procedure: URETEROSCOPY;  Surgeon: Gen Isbell MD;  Location: [facility] OR;  Service: Urology;  Laterality: Right;    UTERINE FIBROID SURGERY         Review of patient's allergies indicates:   Allergen Reactions    Contrast media Hives, Itching and Swelling    Epinephrine Anaphylaxis     Can cause  a Carcinoid Crisis    Ibuprofen Hives, Itching and Swelling    Zofran [ondansetron hcl] Itching     And multiple other reactions    Iodinated contrast media     Morphine Other (See Comments)    Sulfa (sulfonamide antibiotics) Hives, Itching and Swelling       Medications:  Continuous Infusions:  Scheduled Meds:   amLODIPine  10 mg Oral Daily    cefTRIAXone (ROCEPHIN) IVPB  1 g Intravenous Q24H    cyanocobalamin  1,000 mcg Oral Daily    gabapentin  300 mg Oral QHS    heparin (porcine)  5,000 Units Subcutaneous Q8H    metoprolol succinate  25 mg Oral BID    miconazole NITRATE 2 %   Topical (Top) BID    pantoprazole  40 mg Oral Daily    senna-docusate 8.6-50 mg  1 tablet Oral BID     PRN Meds:acetaminophen, albuterol-ipratropium, aluminum-magnesium hydroxide-simethicone, baclofen, cloNIDine, dextrose 10%, dextrose 10%, dicyclomine, glucagon (human recombinant), glucose, glucose, hydrALAZINE, insulin aspart U-100, meclizine, melatonin, methyl salicylate-menthol 15-10%, metoclopramide HCl, naloxone, oxyCODONE, oxyCODONE, simethicone, sodium chloride 0.9%, sodium chloride 0.9%    Family History       Problem Relation (Age of Onset)     Alzheimer's disease Father    Cancer Mother    No Known Problems Son, Son, Son, Son    Stroke Sister          Tobacco Use    Smoking status: Never Smoker    Smokeless tobacco: Never Used   Substance and Sexual Activity    Alcohol use: No     Alcohol/week: 0.0 standard drinks    Drug use: No    Sexual activity: Not Currently       Review of Systems   Constitutional:  Positive for activity change.   HENT:  Negative for congestion, sinus pain and trouble swallowing.    Eyes:  Negative for pain and discharge.   Respiratory:  Negative for shortness of breath.    Cardiovascular:  Negative for chest pain.   Gastrointestinal:  Positive for abdominal pain.   Endocrine: Negative for cold intolerance and heat intolerance.   Musculoskeletal:  Negative for neck pain and neck stiffness.   Skin:  Negative for color change.   Allergic/Immunologic: Negative for environmental allergies and food allergies.   Neurological:  Positive for weakness.   Psychiatric/Behavioral:  Negative for behavioral problems.    Objective:     Vital Signs (Most Recent):  Temp: 98.3 °F (36.8 °C) (07/11/22 0736)  Pulse: 89 (07/11/22 0736)  Resp: 18 (07/11/22 0736)  BP: (!) 148/66 (07/11/22 0736)  SpO2: 97 % (07/11/22 0807)   Vital Signs (24h Range):  Temp:  [98.2 °F (36.8 °C)-99.5 °F (37.5 °C)] 98.3 °F (36.8 °C)  Pulse:  [84-91] 89  Resp:  [14-20] 18  SpO2:  [94 %-97 %] 97 %  BP: (129-148)/(60-66) 148/66     Weight: 79.6 kg (175 lb 7.8 oz)  Body mass index is 28.32 kg/m².    Physical Exam  Vitals and nursing note reviewed.   Constitutional:       General: She is not in acute distress.     Appearance: She is not ill-appearing.   HENT:      Head: Normocephalic and atraumatic.      Right Ear: External ear normal.      Left Ear: External ear normal.      Nose: Nose normal. No congestion or rhinorrhea.   Eyes:      General:         Right eye: No discharge.         Left eye: No discharge.   Cardiovascular:      Rate and Rhythm: Normal rate.   Pulmonary:       Effort: Pulmonary effort is normal. No respiratory distress.   Abdominal:      General: Abdomen is flat.   Musculoskeletal:      Cervical back: Normal range of motion and neck supple.   Skin:     General: Skin is warm and dry.   Neurological:      Mental Status: She is alert and oriented to person, place, and time.   Psychiatric:         Mood and Affect: Mood normal.         Behavior: Behavior normal.       Review of Symptoms      Symptom Assessment (ESAS 0-10 Scale)  Pain:  0  Dyspnea:  0  Anxiety:  0  Nausea:  0  Depression:  0  Anorexia:  0  Fatigue:  0  Insomnia:  0  Restlessness:  0  Agitation:  0       Living Arrangements:  Lives alone and Lives in home    Psychosocial/Cultural: Patient lives alone.  , has four sons.       Advance Care Planning   Advance Directives:   Living Will: No    LaPOST: No    Do Not Resuscitate Status: No    Medical Power of : No      Decision Making:  Patient answered questions       Significant Labs: All pertinent labs within the past 24 hours have been reviewed.  CBC:   Recent Labs   Lab 07/10/22  0519   WBC 3.50*   HGB 8.9*   HCT 29.7*   MCV 86        BMP:  Recent Labs   Lab 07/11/22  0801         K 3.7      CO2 23   BUN 18   CREATININE 0.8   CALCIUM 8.0*     LFT:  Lab Results   Component Value Date    AST 23 07/06/2022    ALKPHOS 88 07/06/2022    BILITOT 0.5 07/06/2022     Albumin:   Albumin   Date Value Ref Range Status   07/06/2022 2.3 (L) 3.5 - 5.2 g/dL Final   05/07/2018 3.3  Final     Protein:   Total Protein   Date Value Ref Range Status   07/06/2022 5.4 (L) 6.0 - 8.4 g/dL Final     Lactic acid:   Lab Results   Component Value Date    LACTATE 1.2 07/03/2022    LACTATE 2.1 07/03/2022       Significant Imaging: I have reviewed all pertinent imaging results/findings within the past 24 hours.    X-Ray Chest AP Portable  Narrative: EXAMINATION:  XR CHEST AP PORTABLE    CLINICAL HISTORY:  Fevers;    TECHNIQUE:  Single frontal view of the  chest was performed.    COMPARISON:  Radiograph 07/02/2022.    FINDINGS:  Cardiac monitoring leads project over the bilateral hemithoraces.  Mediastinal structures are midline.  Hilar contours are unremarkable.  Cardiac silhouette is normal in size.  Lung volumes are symmetric no consolidation.  No pneumothorax or pleural effusion.  No free air beneath the diaphragm.  No acute osseous abnormalities.  Visualized soft tissues are within normal limits.  Impression: 1. No acute radiographic findings in the chest on this single view.    Electronically signed by: Ayden Cates MD  Date:    07/09/2022  Time:    07:44    Cardiology:  most recent ECHO report 7/4/22 reviewed        > 50% of 70 min visit spent in chart review, face to face discussion of goals of care,  symptom assessment, coordination of care and emotional support.    Cris Houston NP  Palliative Medicine  [facility] S[facility]

## 2022-07-11 NOTE — ASSESSMENT & PLAN NOTE
Palliative medicine consult received for goals of care/advance care planning.  Per chart review, patient does not have any advance directives on file, currently full code status. Palliative services introduced to patient and she was receptive to visit. She is alert, oriented, appears comfortable, denies current complaints.   We discussed medical history, which includes 10 hospital encounters in the last 6 months, as well as current medical status and goals of care.      -Pt elects to remain full code/full treatment status  -She does not have advance directives.  She is  and has 4 sons. If she becomes unable to make medical decisions for herself, she would want them to share surrogate decision making responsibility  -Her primary goal at this time is to regain strength to be able to go home, although she does mention that she is lonely at home.She understands that she is high-risk for readmission based on hospitalizations required over the last 6 months but feels that hospital encounters are not affecting her quality of life.   She is satisfied with home-based palliative care and would like for that to continue after SNF.   -Her goals include to regain strength and to extend life regardless of treatment burden. She confirms that she does want to continue cancer treatments.

## 2022-07-11 NOTE — ASSESSMENT & PLAN NOTE
Chronic, improved.  Latest blood pressure and vitals reviewed-   Temp:  [98.2 °F (36.8 °C)-100.3 °F (37.9 °C)]   Pulse:  [86-96]   Resp:  [14-20]   BP: (129-153)/(60-69)   SpO2:  [94 %-97 %] .   Home meds for hypertension were reviewed and noted below.   Hypertension Medications             losartan (COZAAR) 100 MG tablet TAKE 1 TABLET (100 MG TOTAL) BY MOUTH ONCE DAILY.    metoprolol succinate (TOPROL-XL) 25 MG 24 hr tablet Take 0.5 tablets (12.5 mg total) by mouth once daily.          While in the hospital, will manage blood pressure as follows; Adjust home antihypertensive regimen as follows- losartan held due to FLORECITA.  FLORECITA better; however, amlodipine was started.  Increase to amlodipine 10mg daily.  Will increase beta blocker as she has been tachycardic as well.  Monitor closely and adjust as necessary.    Will utilize p.r.n. blood pressure medication only if patient's blood pressure greater than  180/110 and she develops symptoms such as worsening chest pain or shortness of breath.  BP has improved.

## 2022-07-11 NOTE — PLAN OF CARE
Patient AOX4.  Plan of care reviewed with patient. Patient verbalized understanding. Medicated per MAR. Safety precautions maintained. Instructed to use call light for assistance. Call light in reach.

## 2022-07-11 NOTE — SUBJECTIVE & OBJECTIVE
Interval History: fever curve improved; BP much improved.  Could likely dc to SNF tomorrow if remains afebrile.  Replete K and Mag.    Review of Systems   Constitutional:  Positive for activity change. Negative for appetite change, chills, diaphoresis, fatigue and fever.   HENT:  Negative for congestion, postnasal drip, rhinorrhea, sinus pressure, sinus pain and sneezing.    Respiratory:  Negative for cough, chest tightness, shortness of breath, wheezing and stridor.    Cardiovascular:  Negative for chest pain, palpitations and leg swelling.   Gastrointestinal:  Negative for abdominal distention, abdominal pain, blood in stool, constipation, diarrhea, nausea and vomiting.   Endocrine: Negative for cold intolerance and heat intolerance.   Genitourinary:  Negative for dysuria, flank pain, frequency, hematuria and urgency.   Musculoskeletal:  Negative for gait problem.   Neurological:  Positive for weakness (generalized). Negative for dizziness.   Psychiatric/Behavioral:  Negative for agitation and behavioral problems.    All other systems reviewed and are negative.  Objective:     Vital Signs (Most Recent):  Temp: 98.2 °F (36.8 °C) (07/10/22 1947)  Pulse: 91 (07/10/22 1947)  Resp: 16 (07/10/22 1947)  BP: 129/61 (07/10/22 1947)  SpO2: (!) 94 % (07/10/22 1951)   Vital Signs (24h Range):  Temp:  [98.2 °F (36.8 °C)-100.3 °F (37.9 °C)] 98.2 °F (36.8 °C)  Pulse:  [86-96] 91  Resp:  [14-20] 16  SpO2:  [94 %-97 %] 94 %  BP: (129-153)/(60-69) 129/61     Weight: 84.2 kg (185 lb 10 oz)  Body mass index is 29.96 kg/m².    Intake/Output Summary (Last 24 hours) at 7/10/2022 2232  Last data filed at 7/10/2022 1740  Gross per 24 hour   Intake 735.26 ml   Output 500 ml   Net 235.26 ml      Physical Exam  Constitutional:       General: She is not in acute distress.     Appearance: She is well-developed. She is not ill-appearing or toxic-appearing.   HENT:      Head: Normocephalic and atraumatic.   Neck:      Vascular: No JVD.    Cardiovascular:      Rate and Rhythm: Tachycardia present.   Pulmonary:      Effort: Pulmonary effort is normal. No respiratory distress.   Abdominal:      Tenderness: Tenderness: diffuse.   Musculoskeletal:         General: Normal range of motion.      Cervical back: Normal range of motion and neck supple.   Neurological:      General: No focal deficit present.      Mental Status: She is alert and oriented to person, place, and time. Mental status is at baseline.      Cranial Nerves: No cranial nerve deficit.   Psychiatric:         Mood and Affect: Mood normal.         Behavior: Behavior normal.         Thought Content: Thought content normal.       Significant Labs: All pertinent labs within the past 24 hours have been reviewed.  CBC:   Recent Labs   Lab 07/10/22  0519   WBC 3.50*   HGB 8.9*   HCT 29.7*        CMP:   Recent Labs   Lab 07/10/22  0519      K 3.0*      CO2 22*      BUN 18   CREATININE 0.9   CALCIUM 8.4*   ANIONGAP 13   EGFRNONAA >60       Significant Imaging: I have reviewed all pertinent imaging results/findings within the past 24 hours.

## 2022-07-11 NOTE — PLAN OF CARE
Recommendation:   1. Continue current renal diet as tolerated.   2. Addition of [employer] BID to promote wound healing.   3. Addition of Novasource Rneal BID to supplement intake.   4. Monitor weight/labs.   5. RD to follow and monitor intake    Goals:   Pt intake >/= 75% EEN/EPN by RD follow up    Nutrition Goal Status: progressing towards goal  Communication of RD Recs: other (comment) (POC)      Problem: Nutrition Impaired (Sepsis/Septic Shock)  Goal: Optimal Nutrition Intake  Outcome: Ongoing, Progressing     Problem: Oral Intake Inadequate (Acute Kidney Injury/Impairment)  Goal: Optimal Nutrition Intake  Outcome: Ongoing, Progressing     Problem: Oral Intake Altered (Oncology Care)  Goal: Optimal Oral Intake  Outcome: Ongoing, Progressing     Problem: Impaired Wound Healing  Goal: Optimal Wound Healing  Outcome: Ongoing, Progressing

## 2022-07-11 NOTE — PT/OT/SLP PROGRESS
Occupational Therapy   Treatment    Name: Patito Domingo  MRN: [id number]  Admitting Diagnosis:  Sepsis       Recommendations:     Discharge Recommendations: nursing facility, skilled  Discharge Equipment Recommendations:  other (see comments) (defer to SNF)  Barriers to discharge:  Decreased caregiver support, Other (Comment) (Increased level of assistance)    Assessment:     Patito Domingo is a 71 y.o. female with a medical diagnosis of Sepsis.  Performance deficits affecting function are weakness, impaired endurance, impaired self care skills, impaired functional mobilty, gait instability, decreased lower extremity function, impaired balance, decreased safety awareness, pain, decreased ROM. Pt found in bed, agreeable to therapy despite pain in B hips, R>L.  Pt required Mod A with bed mobility and stood with CGA/Poli using RW.  Pt declined any further therapy after standing trials 2/2 B hip pain.  Pt with poor tolerance of therapy. Continue OT services to address functional goals, progressing as able.    Rehab Prognosis:  Good; patient would benefit from acute skilled OT services to address these deficits and reach maximum level of function.       Plan:     Patient to be seen 3 x/week to address the above listed problems via therapeutic activities, therapeutic exercises, self-care/home management  · Plan of Care Expires: 08/07/22  · Plan of Care Reviewed with: patient    Subjective     Pain/Comfort:  · Pain Rating 1: 10/10  · Location - Side 1: Right  · Location - Orientation 1: generalized  · Location 1: hip  · Pain Addressed 1: Cessation of Activity, Nurse notified  · Pain Rating Post-Intervention 1: 10/10  · Pain Rating 2: 9/10  · Location - Side 2: Left  · Location - Orientation 2: generalized  · Location 2: hip  · Pain Addressed 2: Cessation of Activity, Nurse notified  · Pain Rating Post-Intervention 2: 9/10    Objective:     Communicated with: RN prior to session.  Patient found HOB elevated with  "telemetry, peripheral IV upon OT entry to room.    General Precautions: Standard, fall   Orthopedic Precautions:N/A   Braces: N/A  Respiratory Status: Room air     Occupational Performance:     Bed Mobility:    · Patient completed Rolling/Turning to Left with  minimum assistance and with side rail  · Patient completed Scooting/Bridging with stand by assistance to scoot seated EOB  · Patient completed Supine to Sit with moderate assistance, with side rail and HOB elevated, increased time and effort, vc's for effective technique  · Patient completed Sit to Supine with moderate assistance for BLE assist    Functional Mobility/Transfers:  · Patient completed Sit <> Stand Transfer with contact guard assistance and minimum assistance  with  rolling walker  Limited standing tolerance secondary to pain ~10-15" each stand.  VCS for upright posture which improved on 2nd stand.    · Functional Mobility: Pt side stepped to L side with CGA/Min A using RW.     Activities of Daily Living:  · Declined      [facility] 6 Click ADL: 16    Treatment & Education:  Pt declined therex seated EOB and bed level 2/2 pain.     Patient left HOB elevated with all lines intact, call button in reach, bed alarm on and RN notifiedEducation:      GOALS:   Multidisciplinary Problems     Occupational Therapy Goals        Problem: Occupational Therapy    Goal Priority Disciplines Outcome Interventions   Occupational Therapy Goal     OT, PT/OT Ongoing, Progressing    Description: Goals to be met by: 8/7/2022     Patient will increase functional independence with ADLs by performing:    LE Dressing with Set-up Assistance.  Grooming while standing with Set-up Assistance.  Toileting from toilet with Supervision for hygiene and clothing management.   Supine to sit with Modified [place].  Step transfer with Supervision & appropriate AD.   Toilet transfer to toilet with Supervision & appropriate AD.                      Time Tracking:     OT Date of Treatment: " 07/11/22  OT Start Time: 1455  OT Stop Time: 1510  OT Total Time (min): 15 min    Billable Minutes:Therapeutic Activity 15            7/11/2022

## 2022-07-11 NOTE — PROGRESS NOTES
[facility] - Med Surg  Adult Nutrition  Progress Note    SUMMARY       Recommendations    Recommendation:   1. Continue current renal diet as tolerated.   2. Addition of [employer] BID to promote wound healing.   3. Addition of Novasource Rneal BID to supplement intake.   4. Monitor weight/labs.   5. 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 Admitted with Sepsis and shearing to R buttock.     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 Receiving renal diet with 25-75% intake recorded. Pending SNF placement. PIV. Josh Score: 15 NFPE not completed 2/2 pt with other providers  Nutrition Discharge Planning: d/c on renal diet    Nutrition Risk Screen    Nutrition Risk Screen: no indicators present    Nutrition/Diet History    Food Preferences: ADOLFO  Spiritual, Cultural Beliefs, Rastafarian Practices, Values that Affect Care: no  Food  "Allergies: NKFA  Factors Affecting Nutritional Intake: other (see comments) (inconsistent meal intake)    Anthropometrics    Temp: 97.9 °F (36.6 °C)  Height Method: Stated  Height: 5' 6" (167.6 cm)  Height (inches): 66 in  Weight Method: Bed Scale  Weight: 79.6 kg (175 lb 7.8 oz)  Weight (lb): 175.49 lb  Ideal Body Weight (IBW), Female: 130 lb  % Ideal Body Weight, Female (lb): 140.08 %  BMI (Calculated): 28.3  BMI Grade: 25 - 29.9 - overweight       Lab/Procedures/Meds    Pertinent Labs Reviewed: reviewed  Pertinent Labs Comments: Ca 8.0  Pertinent Medications Reviewed: reviewed  Pertinent Medications Comments: amlodipine, rocephin, cyanocobalamin, gabapentin, heparin, TOPROL-XL, pantoprazole, senna docusate      Estimated/Assessed Needs    Weight Used For Calorie Calculations: 82.6 kg (182 lb 1.6 oz)  Energy Calorie Requirements (kcal): 1778  Energy Need Method: [place]-St Jeor (x 1.3)  Protein Requirements: 66-83 (0.8-1.0 gm/kg)  Weight Used For Protein Calculations: 82.6 kg (182 lb 1.6 oz)     Estimated Fluid Requirement Method: RDA Method (or PER MD)  RDA Method (mL): 1778         Nutrition Prescription Ordered    Current Diet Order: Renal    Evaluation of Received Nutrient/Fluid Intake    I/O: 815.3/500  Energy Calories Required: not meeting needs  Protein Required: not meeting needs  Fluid Required: not meeting needs  Comments: LBM 7/11  Tolerance: tolerating  % Intake of Estimated Energy Needs: Other: 25-75%  % Meal Intake: Other: 25-75%    Nutrition Risk    Level of Risk/Frequency of Follow-up:  (2x/week)     Monitor and Evaluation    Food and Nutrient Intake: energy intake, food and beverage intake  Food and Nutrient Adminstration: diet order  Knowledge/Beliefs/Attitudes: food and nutrition knowledge/skill  Physical Activity and Function: nutrition-related ADLs and IADLs  Anthropometric Measurements: weight, weight change, body mass index  Biochemical Data, Medical Tests and Procedures: electrolyte and " renal panel, gastrointestinal profile, glucose/endocrine profile, inflammatory profile, lipid profile     Nutrition Follow-Up    RD Follow-up?: Yes

## 2022-07-11 NOTE — PROGRESS NOTES
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 coli UTI + bacteremia on cx from 7/2. On Day 3 of CTX, 7/4 blood cx NGTD. Patient afebrile initially but with intermittent low-grade fevers, last temp 100.9 on evening of 7/9/22. ID consulted for persistent fever despite abx.    #E. coli bacteremia + UTI   - presence of large bilateral renal stones complicates picture, although non-obstructive  - bilateral DVT US is negative  - repeat blood cx from 7/7 NGTD  - continue CTX 1g IV q 24H - will need 2 weeks from negative cx - start date is 7/2  - possible that fever is related to carcinoid     Henny Jeffery MD  [facility] PGY-3      Thank you for allowing us to participate in the care of this patient. We will continue to follow along. Case has been discussed with consult staff, who is in agreement with assessment and plan. ID will sign off.     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 Had been prescribed Keflex the day prior in the outpatient setting for UTI.      In the ED, patient was noted to be somnolent and ill-appearing. Workup was significant for +UA (> 100 WBC, many bacteria, 3+ LE) leukocytosis (WBC 22.03), lactic acidosis, FLORECITA, elevated troponin. CT abd/pelvis with no acute findings; unchanged size of carcinoid  tumor with liver mets and lymphadenopathy. Started on meropenem for UTI due to recent urine culture growing ESBL klebsiella (5/15/22, 3/27/22, 22). Mental status subsequently improved. Patient admitted to hospital medicine service for further management.      Both 22 urine and 1/ blood cx growing E. coli (fluoroquinolone resistant). Blood cx have since cleared and patient transitioned to CTX this AM. Patient afebrile until night of  when she spiked a temp to 100.4 F. This AM she had another temperature of 100.5 F, so ID was consulted for persistent fever despite abx therapy.       Of note, patient has had multiple admissions this year alone for recurrent UTIs. She has known bilateral nephrolithiasis. CTAP this admission again revealed non-obstructing renal calculi, including large calculus in lower pole of the right kidney, w/o hydronephrosis. Has been seen by Urology but decided against stone retrieval due risks of percutaneous nephrolithotomy, and stones not clearly causing UTIs. She was started on methenamine and Vitamin C for UTI ppx in     Subjective:      No acute events overnight. Complaining of unchanged back/abdominal pain this AM. Afebrile x 36H.      Objective:   Last 24 Hour Vital Signs:  BP  Min: 129/61  Max: 137/62  Temp  Av.7 °F (37.1 °C)  Min: 98.2 °F (36.8 °C)  Max: 99.5 °F (37.5 °C)  Pulse  Av.9  Min: 84  Max: 91  Resp  Av.6  Min: 14  Max: 20  SpO2  Av.6 %  Min: 94 %  Max: 97 %  Weight  Av.6 kg (175 lb 7.8 oz)  Min: 79.6 kg (175 lb 7.8 oz)  Max: 79.6 kg (175 lb 7.8 oz)  I/O last 3 completed shifts:  In: 1195.3 [P.O.:1060; IV Piggyback:135.3]  Out: 500 [Urine:500]    Physical Exam  General: Awake, alert, NAD. Obese.  HEENT: NCAT. EOMI intact. MMM.   Neck: Trachea midline.   Lungs: CTAB. No wheezing/rales/rhonchi.   CV: RRR. Normal S1/S2. No murmurs/rubs/gallops.  Abdomen: Soft, NTND. + BS.  Multiple well-healing surgical scars.  Extremities: No cyanosis,  clubbing, or edema.   Skin: No rashes or lesions noted.   Neuro: A&O x3. CN II-XII grossly intact.     Laboratory:  Laboratory Data Reviewed: yes  Pertinent Findings:  Recent Labs   Lab 07/05/22  0509 07/06/22  0516 07/07/22  0510 07/08/22  0605 07/10/22  0519   WBC 6.47 4.42  --  4.85 3.50*   HGB 8.4* 8.6*  --  8.8* 8.9*   HCT 27.8* 27.6*  --  29.1* 29.7*    145*  --  177 210   MCV 86 84  --  85 86   RDW 16.5* 16.4*  --  16.6* 16.2*    146* 146* 144 143   K 3.9 3.9 3.5 3.3* 3.0*   * 111* 111* 108 108   CO2 24 26 28 20* 22*   BUN 42* 37* 27* 22 18   CREATININE 2.1* 1.5* 1.0 1.0 0.9   * 113* 115* 102 100   PROT 6.1 5.4*  --   --   --    ALBUMIN 2.2* 2.3*  --   --   --    BILITOT 0.5 0.5  --   --   --    AST 29 23  --   --   --    ALKPHOS 93 88  --   --   --    ALT 22 20  --   --   --          Microbiology Data:  Microbiology Results (last 7 days)     Procedure Component Value Units Date/Time    Blood culture [[id number]] Collected: 07/07/22 1502    Order Status: Completed Specimen: Blood Updated: 07/11/22 0612     Blood Culture, Routine No Growth to date      No Growth to date      No Growth to date      No Growth to date    Blood culture [[id number]] Collected: 07/07/22 1734    Order Status: Completed Specimen: Blood Updated: 07/11/22 0612     Blood Culture, Routine No Growth to date      No Growth to date      No Growth to date      No Growth to date    Narrative:      Collection has been rescheduled by [email or web address] at 07/07/2022 15:04 Reason:   Unable to collect both sets of cultures  Collection has been rescheduled by [email or web address] at 07/07/2022 15:04 Reason:   Unable to collect both sets of cultures    Blood culture [[id number]] Collected: 07/04/22 1008    Order Status: Completed Specimen: Blood Updated: 07/09/22 1422     Blood Culture, Routine No growth after 5 days.    Blood culture [[id number]] Collected: 07/04/22 1008    Order Status: Completed Specimen: Blood Updated: 07/09/22 1422     Blood Culture,  Routine No growth after 5 days.    Blood culture #1 **CANNOT BE ORDERED STAT** [[id number]] Collected: 07/02/22 2213    Order Status: Completed Specimen: Blood Updated: 07/08/22 1212     Blood Culture, Routine No growth after 5 days.    Blood culture [[id number]]     Order Status: Sent Specimen: Blood     Blood culture #2 **CANNOT BE ORDERED STAT** [[id number]]  (Abnormal)  (Susceptibility) Collected: 07/02/22 2213    Order Status: Completed Specimen: Blood Updated: 07/06/22 1011     Blood Culture, Routine Gram stain aer bottle: Gram negative rods       Results called to and read back by: Renee Lancaster RN  07/04/2022  01:29      ESCHERICHIA COLI    Urine culture [[id number]]  (Abnormal)  (Susceptibility) Collected: 07/02/22 1948    Order Status: Completed Specimen: Urine Updated: 07/05/22 0923     Urine Culture, Routine ESCHERICHIA COLI  >100,000 cfu/ml      Narrative:      Specimen Source->Urine          Antimicrobials:  CTX 1g IV x1 (7/2/22)  Meropenem 1g IV q 8H (7/2/22-7/7/22)  CTX 1g IV q 24H (7/7- )    Other Results:  Radiology Results:  X-Ray Hip 2 or 3 views Right (with Pelvis when performed)    Result Date: 7/7/2022  EXAMINATION: XR HIP WITH PELVIS WHEN PERFORMED, 2 OR 3  VIEWS RIGHT CLINICAL HISTORY: Right hip pain following fall a few days ago; TECHNIQUE: AP view of the pelvis and frog leg lateral view of the right hip were performed. COMPARISON: None FINDINGS: Three views right hip. The bilateral sacroiliac joints are intact.  The pubic symphysis is intact.  The bilateral femoroacetabular joints are intact.  There is osteopenia.     1. No acute displaced fracture or dislocation of the right hip. Electronically signed by: Cory Ulrich MD Date:    07/07/2022 Time:    12:08    X-Ray Abdomen AP 1 View    Result Date: 7/7/2022  EXAMINATION: XR ABDOMEN AP 1 VIEW CLINICAL HISTORY: Constipation; TECHNIQUE: AP View(s) of the abdomen was performed. COMPARISON: 10/15/2020 FINDINGS: Intestinal gas pattern remains  normal with no significant distention or obstruction.  Some fecal matter seen in the rectum.  Calcified mass in the right abdomen remains.     See above Electronically signed by: Alfa Mendes MD Date:    07/07/2022 Time:    07:52    X-Ray Chest AP Portable    Result Date: 7/9/2022  EXAMINATION: XR CHEST AP PORTABLE CLINICAL HISTORY: Fevers; TECHNIQUE: Single frontal view of the chest was performed. COMPARISON: Radiograph 07/02/2022. FINDINGS: Cardiac monitoring leads project over the bilateral hemithoraces.  Mediastinal structures are midline.  Hilar contours are unremarkable.  Cardiac silhouette is normal in size.  Lung volumes are symmetric no consolidation.  No pneumothorax or pleural effusion.  No free air beneath the diaphragm.  No acute osseous abnormalities.  Visualized soft tissues are within normal limits.     1. No acute radiographic findings in the chest on this single view. Electronically signed by: Ayden Cates MD Date:    07/09/2022 Time:    07:44    X-Ray Chest AP Portable    Result Date: 7/2/2022  EXAMINATION: XR CHEST AP PORTABLE CLINICAL HISTORY: ams; TECHNIQUE: Single frontal view of the chest was performed. COMPARISON: Chest radiograph 03/06/2022. FINDINGS: The lungs are well expanded and clear. No focal opacities are seen. The pleural spaces are clear. The cardiac silhouette is unremarkable. The visualized osseous structures are unremarkable.     No acute cardiopulmonary abnormality. Electronically signed by: Bennie Kiesr Date:    07/02/2022 Time:    22:35    US Lower Extremity Veins Bilateral    Result Date: 7/7/2022  EXAMINATION: US LOWER EXTREMITY VEINS BILATERAL CLINICAL HISTORY: R/o DVT; TECHNIQUE: Duplex and color flow Doppler and dynamic compression was performed of the bilateral lower extremity veins was performed. COMPARISON: Left lower extremity ultrasound 12/03/2021 FINDINGS: Right thigh veins: The common femoral, femoral, popliteal, upper greater saphenous, and deep femoral  veins are patent and free of thrombus. The veins are normally compressible and have normal phasic flow and augmentation response. Right calf veins: The visualized calf veins are patent. Left thigh veins: The common femoral, femoral, popliteal, upper greater saphenous, and deep femoral veins are patent and free of thrombus. The veins are normally compressible and have normal phasic flow and augmentation response. Left calf veins: The visualized calf veins are patent. Miscellaneous: None     No evidence of deep venous thrombosis in either lower extremity. Electronically signed by: Eleanor Shipley MD Date:    07/07/2022 Time:    23:44    Echo    Result Date: 7/4/2022  · The left ventricle is normal in size with moderate concentric hypertrophy and normal systolic function. · The estimated ejection fraction is 70%. · Grade I left ventricular diastolic dysfunction. · Mild tricuspid regurgitation. · The estimated PA systolic pressure is 44 mmHg. · Normal right ventricular size with normal right ventricular systolic function. · There is mild pulmonary hypertension.      CT Abdomen Pelvis  Without Contrast    Result Date: 7/2/2022  EXAMINATION: CT ABDOMEN PELVIS WITHOUT CONTRAST CLINICAL HISTORY: Abdominal abscess/infection suspected;Nausea/vomiting;Abdominal pain, acute, nonlocalized; TECHNIQUE: Multiplanar images were obtained of the abdomen and pelvis from the hemidiaphragms through the symphysis pubis without intravenous contrast. COMPARISON: CT of the abdomen and pelvis from 05/30/2022.  MRI from 05/16/2022. FINDINGS: Lung Bases: Clear. Heart: Heart size is normal.  No pericardial effusion. Liver: There is an ill-defined 2.9 cm hypodensity in the right hepatic lobe (series 2, image 30), previously measuring 2.6 cm on MRI from 05/16/2022.   There are calcified lesions in the left hepatic lobe, similar to prior.  Multiple additional hepatic lesions are not well seen on this noncontrast CT, better seen on prior MRI. Biliary  tract: There is pneumobilia, unchanged.  There is no intra or extrahepatic biliary ductal dilation. Gallbladder: Surgically absent. Pancreas: There is mild fatty atrophy of the pancreas.  There are no focal lesions in the pancreas.  There is no pancreatic ductal dilation. Spleen: Normal size without focal lesion. Adrenals: Normal. Kidneys and urinary collecting systems: There are multiple nonobstructing bilateral renal calculi, including a large calculus in the lower pole right kidney extending into the calices, similar appearance to prior.  There is no hydronephrosis or obstructing stone. Lymph nodes: There is a cardiophrenic lymph node measuring 1.0 cm in short axis (series 2, image 20), stable in size from prior.  There is an 8 mm aortocaval lymph node (series 2, image 67), unchanged in size from prior.  Several additional prominent retroperitoneal nodes are seen. Stomach and bowel: The stomach is normal.  There are postop changes of partial colectomy.  The anastomosis is unremarkable.  There is no bowel wall thickening or pneumatosis.  No obstruction. Peritoneum and mesentery: Again seen is a partially calcified mass in the right upper quadrant adjacent to the SMA (series 2, image 89), similar in size and appearance to prior. Vasculature: Normal. Urinary bladder: There is a focus of air in the urinary bladder which may be related to prior instrumentation, correlate clinically. Reproductive organs: The uterus is absent. Body wall: No abnormality. Musculoskeletal: No aggressive osseous lesion.     1. No acute abnormality of the abdomen or pelvis. 2. Unchanged size of the partially calcified lesion in the right upper quadrant mesentery, in keeping with patient's reported history of carcinoid. 3. Ill-defined hepatic masses compatible with hepatic metastatic disease, overall better evaluated on prior MRI examinations. 4. Mildly enlarged cardiophrenic and retroperitoneal lymph nodes as above, stable. Electronically  signed by: Bennie Kiser Date:    07/02/2022 Time:    22:57      Current Medications:     Infusions:       Scheduled:   amLODIPine  10 mg Oral Daily    cefTRIAXone (ROCEPHIN) IVPB  1 g Intravenous Q24H    cyanocobalamin  1,000 mcg Oral Daily    gabapentin  300 mg Oral QHS    heparin (porcine)  5,000 Units Subcutaneous Q8H    metoprolol succinate  25 mg Oral BID    pantoprazole  40 mg Oral Daily    senna-docusate 8.6-50 mg  1 tablet Oral BID        PRN:  acetaminophen, albuterol-ipratropium, aluminum-magnesium hydroxide-simethicone, baclofen, cloNIDine, dextrose 10%, dextrose 10%, dicyclomine, glucagon (human recombinant), glucose, glucose, hydrALAZINE, insulin aspart U-100, meclizine, melatonin, methyl salicylate-menthol 15-10%, metoclopramide HCl, naloxone, oxyCODONE, oxyCODONE, simethicone, sodium chloride 0.9%, sodium chloride 0.9%

## 2022-07-11 NOTE — PLAN OF CARE
Suzie spoke with Brandee [phone] with [facility]. Per Brandee, pt is currently under review for [facility]. Brandee stated she will let Sw know either way if pt is accepted or not.     Suzie spoke with Janie with [facility]. Per Janie, she is currently reviewing pt for either [facility] or [facility]. Janie stated she will let Sw know either way if pt has been accepted or denied.     Suzie will continue to follow pt for d/c planning.     Future Appointments   Date Time Provider Department Center   7/15/2022  8:00 AM Amy Bright NP [address]   9/1/2022  9:20 AM Ervin Parikh MD [facility] UROLOGY [facility]   9/21/2022 10:40 AM DO TARAH Savage [facility]         07/11/22 0831   Post-Acute Status   Post-Acute Authorization Placement

## 2022-07-11 NOTE — DISCHARGE INSTRUCTIONS
[facility]  Facility Transfer Orders        Admit to: SNF    Diagnoses:   Active Hospital Problems    Diagnosis  POA    *Sepsis [A41.9]  Yes    Non-traumatic rhabdomyolysis [M62.82]  Yes    Bacteremia [R78.81]  Yes    NSTEMI (non-ST elevated myocardial infarction) [I21.4]  Yes    Lactic acidosis [E87.2]  Yes    Chronic diastolic (congestive) heart failure [I50.32]  Yes    FLORECITA (acute kidney injury) [N17.9]  Yes    HLD (hyperlipidemia) [E78.5]  Yes    Essential hypertension [I10]  Yes     Chronic     bp well controlled on current         Anemia of chronic disease [D63.8]  Yes     Chronic     Formatting of this note might be different from the original.  Last Assessment & Plan:   The patient's H/H is stable and consistent with previous laboratory measurements, and the patient exhibits no signs or symptoms of acute bleeding; there is no indication for transfusion.  Will continue to monitor.        Acute cystitis with hematuria [N30.01]  Yes    Metastatic carcinoid tumor [C7B.00]  Yes     Chronic     Cont per heme / onc           Resolved Hospital Problems   No resolved problems to display.     Allergies:   Review of patient's allergies indicates:   Allergen Reactions    Contrast media Hives, Itching and Swelling    Epinephrine Anaphylaxis     Can cause  a Carcinoid Crisis    Ibuprofen Hives, Itching and Swelling    Zofran [ondansetron hcl] Itching     And multiple other reactions    Iodinated contrast media     Morphine Other (See Comments)    Sulfa (sulfonamide antibiotics) Hives, Itching and Swelling       Code Status: full    Vitals: Routine       Diet: cardiac diet    Activity: Activity as tolerated    Nursing Precautions: Aspiration , Fall, and Pressure ulcer prevention    Bed/Surface: Low Air Loss    Consults: PT to evaluate and treat- 5 times a week, OT to evaluate and treat- 5 times a week, and Nutrition to evaluate and recommend diet    Oxygen: room air    Dialysis: Patient is not on dialysis.     Labs:   N/A                  Pending Diagnostic Studies:       None          Imaging: N/A    Miscellaneous Care:   Routine Skin for Bedridden Patients:  Apply moisture barrier cream to all    IV Access: Mid-line     Medications: Discontinue all previous medication orders, if any. See new list below.  Current Discharge Medication List        START taking these medications    Details   amLODIPine (NORVASC) 10 MG tablet Take 1 tablet (10 mg total) by mouth once daily.  Qty: 30 tablet, Refills: 11    Comments: .      cefTRIAXone (ROCEPHIN) 1 g/50 mL PgBk IVPB Inject 50 mLs (1 g total) into the vein once daily. End date 7/16/22 for 5 days  Qty: 250 mL, Refills: 0      senna-docusate 8.6-50 mg (PERICOLACE) 8.6-50 mg per tablet Take 1 tablet by mouth 2 (two) times daily.           CONTINUE these medications which have CHANGED    Details   metoprolol succinate (TOPROL-XL) 25 MG 24 hr tablet Take 1 tablet (25 mg total) by mouth 2 (two) times daily.  Qty: 180 tablet, Refills: 3    Comments: .  Associated Diagnoses: Essential hypertension; 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           CONTINUE these medications which have NOT CHANGED    Details   ascorbic acid, vitamin C, (VITAMIN C) 1000 MG tablet Take 1 tablet (1,000 mg total) by mouth 2 (two) times daily.  Qty: 60 tablet, Refills: 5    Associated Diagnoses: Recurrent UTI      atorvastatin (LIPITOR) 40 MG tablet Take 1 tablet (40 mg total) by mouth every evening.  Qty: 90 tablet, Refills: 3      baclofen (LIORESAL) 10 MG tablet Take 1 tablet (10 mg total) by mouth 2 (two) times daily as needed (muscle cramps).  Qty: 90 tablet, Refills: 1    Associated Diagnoses: 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; Muscle cramps; Myalgia; Chronic pain syndrome      cyanocobalamin (VITAMIN B-12) 1000 MCG tablet Take 1 tablet (1,000 mcg total) by mouth once daily.  Qty: 30 tablet, Refills: 5    Associated Diagnoses: Low serum vitamin B12      dicyclomine (BENTYL) 20 mg tablet TAKE  1 TABLET(20 MG) BY MOUTH THREE TIMES DAILY AS NEEDED FOR ABDOMINAL PAIN  Qty: 60 tablet, Refills: 1    Comments: **Patient requests 90 days supply**  Associated Diagnoses: Abdominal pain, unspecified abdominal location      gabapentin (NEURONTIN) 300 MG capsule Take 1 capsule (300 mg total) by mouth every evening.  Qty: 30 capsule, Refills: 1    Associated Diagnoses: 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; Chronic pain syndrome; Muscle cramps; Myalgia      LIDOcaine (LIDODERM) 5 % Place 1 patch onto the skin once daily. Remove & Discard patch within 12 hours or as directed by MD  Qty: 15 patch, Refills: 0      magnesium oxide (MAG-OX) 400 mg (241.3 mg magnesium) tablet Take 1 tablet (400 mg total) by mouth 2 (two) times daily.  Qty: 60 tablet, Refills: 1      meclizine (ANTIVERT) 25 mg tablet Take 1 tablet (25 mg total) by mouth 3 (three) times daily as needed for Dizziness.  Qty: 60 tablet, Refills: 3      methenamine (HIPREX) 1 gram Tab Take 1 tablet (1 g total) by mouth 2 (two) times daily.  Qty: 60 tablet, Refills: 6    Associated Diagnoses: Recurrent UTI      oxyCODONE (ROXICODONE) 10 mg Tab immediate release tablet Take 1 tablet (10 mg total) by mouth every 8 (eight) hours as needed for Pain.  Qty: 60 tablet, Refills: 0    Comments: Cancer pain  Associated Diagnoses: 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      pantoprazole (PROTONIX) 40 MG tablet Take 1 tablet (40 mg total) by mouth once daily.  Qty: 90 tablet, Refills: 3    Associated Diagnoses: Gastroesophageal reflux disease, unspecified whether esophagitis present      phenazopyridine (PYRIDIUM) 100 MG tablet Take 1 tablet (100 mg total) by mouth 3 (three) times daily as needed for Pain.  Qty: 30 tablet, Refills: 0           STOP taking these medications       cephALEXin (KEFLEX) 500 MG capsule Comments:   Reason for Stopping:         diclofenac sodium (VOLTAREN) 1 % Gel Comments:   Reason for Stopping:         diphenoxylate-atropine  2.5-0.025 mg (LOMOTIL) 2.5-0.025 mg per tablet Comments:   Reason for Stopping:         losartan (COZAAR) 100 MG tablet Comments:   Reason for Stopping:             Follow up:    Follow-up Information       [facility]. Call.    Why: to apply for utility assistance and additional services provided through [employer].  Contact information:  Phone: [phone]                             Immunizations Administered as of 7/11/2022       Name Date Dose VIS Date Route Exp Date    COVID-19, MRNA, LN-S, PF (Pfizer) (Purple Cap) 8/14/2021 -- -- Intramuscular --    Site: Right arm     : [employer]     Lot: [id number]     COVID-19, MRNA, LN-S, PF (Pfizer) (Purple Cap) 7/25/2021 -- -- -- --    Lot: [id number]     External: Auto Reconciled From Outside Source             This patient has had both covid vaccinations      BERENICE Cook   ambulatory

## 2022-07-11 NOTE — ASSESSMENT & PLAN NOTE
Patient's anemia is currently stable. S/p 0 units of PRBCs.  Current CBC reviewed-   Lab Results   Component Value Date    HGB 8.9 (L) 07/10/2022    HCT 29.7 (L) 07/10/2022    MCV 86 07/10/2022     07/10/2022     Monitor serial CBC and transfuse if patient becomes hemodynamically unstable, symptomatic or H/H drops below 7/21.

## 2022-07-11 NOTE — ASSESSMENT & PLAN NOTE
Blood culture 1 tube growing GNR  Possible seeding from bladder  Cont meropenem  Repeat blood cultures-NGTD  Now on IV rocephin  ID consulted-will need 2 wk course IV rocephin; start date 7/2

## 2022-07-12 ENCOUNTER — PATIENT OUTREACH (OUTPATIENT)
Dept: ADMINISTRATIVE | Facility: OTHER | Age: 72
End: 2022-07-12
Payer: MEDICARE

## 2022-07-12 ENCOUNTER — HOSPITAL ENCOUNTER (INPATIENT)
Facility: HOSPITAL | Age: 72
LOS: 20 days | Discharge: HOME HEALTH | DRG: 689 | End: 2022-08-01
Attending: HOSPITALIST | Admitting: HOSPITALIST
Payer: MEDICARE

## 2022-07-12 VITALS
TEMPERATURE: 99 F | SYSTOLIC BLOOD PRESSURE: 114 MMHG | RESPIRATION RATE: 16 BRPM | OXYGEN SATURATION: 95 % | DIASTOLIC BLOOD PRESSURE: 56 MMHG | HEART RATE: 78 BPM | BODY MASS INDEX: 28.21 KG/M2 | WEIGHT: 175.5 LBS | HEIGHT: 66 IN

## 2022-07-12 DIAGNOSIS — I50.32 CHRONIC DIASTOLIC (CONGESTIVE) HEART FAILURE: ICD-10-CM

## 2022-07-12 DIAGNOSIS — M19.90 ARTHRITIS: ICD-10-CM

## 2022-07-12 DIAGNOSIS — A41.9 SEPSIS: ICD-10-CM

## 2022-07-12 DIAGNOSIS — C7B.00 METASTATIC CARCINOID TUMOR: Primary | Chronic | ICD-10-CM

## 2022-07-12 DIAGNOSIS — G89.4 CHRONIC PAIN SYNDROME: ICD-10-CM

## 2022-07-12 PROCEDURE — 63600175 PHARM REV CODE 636 W HCPCS: Performed by: NURSE PRACTITIONER

## 2022-07-12 PROCEDURE — 97110 THERAPEUTIC EXERCISES: CPT | Mod: CQ

## 2022-07-12 PROCEDURE — 25000003 PHARM REV CODE 250: Performed by: HOSPITALIST

## 2022-07-12 PROCEDURE — 11000004 HC SNF PRIVATE

## 2022-07-12 PROCEDURE — 25000003 PHARM REV CODE 250: Performed by: INTERNAL MEDICINE

## 2022-07-12 PROCEDURE — 99900035 HC TECH TIME PER 15 MIN (STAT)

## 2022-07-12 PROCEDURE — 94761 N-INVAS EAR/PLS OXIMETRY MLT: CPT

## 2022-07-12 PROCEDURE — 63600175 PHARM REV CODE 636 W HCPCS: Performed by: HOSPITALIST

## 2022-07-12 PROCEDURE — 97530 THERAPEUTIC ACTIVITIES: CPT | Mod: CQ

## 2022-07-12 PROCEDURE — 25000003 PHARM REV CODE 250: Performed by: NURSE PRACTITIONER

## 2022-07-12 PROCEDURE — A4216 STERILE WATER/SALINE, 10 ML: HCPCS | Performed by: HOSPITALIST

## 2022-07-12 RX ORDER — ACETAMINOPHEN 325 MG/1
650 TABLET ORAL EVERY 6 HOURS PRN
Status: DISCONTINUED | OUTPATIENT
Start: 2022-07-12 | End: 2022-08-01 | Stop reason: HOSPADM

## 2022-07-12 RX ORDER — MICONAZOLE NITRATE 2 %
POWDER (GRAM) TOPICAL 2 TIMES DAILY
Status: DISCONTINUED | OUTPATIENT
Start: 2022-07-12 | End: 2022-07-21

## 2022-07-12 RX ORDER — ATORVASTATIN CALCIUM 20 MG/1
40 TABLET, FILM COATED ORAL EVERY EVENING
Status: DISCONTINUED | OUTPATIENT
Start: 2022-07-12 | End: 2022-08-01 | Stop reason: HOSPADM

## 2022-07-12 RX ORDER — GABAPENTIN 300 MG/1
300 CAPSULE ORAL NIGHTLY
Status: DISCONTINUED | OUTPATIENT
Start: 2022-07-12 | End: 2022-08-01 | Stop reason: HOSPADM

## 2022-07-12 RX ORDER — DICYCLOMINE HYDROCHLORIDE 10 MG/1
20 CAPSULE ORAL 3 TIMES DAILY PRN
Status: DISCONTINUED | OUTPATIENT
Start: 2022-07-12 | End: 2022-08-01 | Stop reason: HOSPADM

## 2022-07-12 RX ORDER — AMOXICILLIN 250 MG
1 CAPSULE ORAL 2 TIMES DAILY
Status: DISCONTINUED | OUTPATIENT
Start: 2022-07-12 | End: 2022-07-12 | Stop reason: SDUPTHER

## 2022-07-12 RX ORDER — CALCIUM CARBONATE 200(500)MG
500 TABLET,CHEWABLE ORAL 2 TIMES DAILY PRN
Status: DISCONTINUED | OUTPATIENT
Start: 2022-07-12 | End: 2022-08-01 | Stop reason: HOSPADM

## 2022-07-12 RX ORDER — MICONAZOLE NITRATE 2 %
POWDER (GRAM) TOPICAL 2 TIMES DAILY
Refills: 0
Start: 2022-07-12 | End: 2023-03-07

## 2022-07-12 RX ORDER — METHENAMINE HIPPURATE 1000 MG/1
1 TABLET ORAL 2 TIMES DAILY
Status: DISCONTINUED | OUTPATIENT
Start: 2022-07-12 | End: 2022-08-01 | Stop reason: HOSPADM

## 2022-07-12 RX ORDER — AMOXICILLIN 250 MG
1 CAPSULE ORAL 2 TIMES DAILY
Status: DISCONTINUED | OUTPATIENT
Start: 2022-07-12 | End: 2022-08-01 | Stop reason: HOSPADM

## 2022-07-12 RX ORDER — MECLIZINE HYDROCHLORIDE 25 MG/1
25 TABLET ORAL 3 TIMES DAILY PRN
Status: DISCONTINUED | OUTPATIENT
Start: 2022-07-12 | End: 2022-08-01 | Stop reason: HOSPADM

## 2022-07-12 RX ORDER — METOPROLOL SUCCINATE 25 MG/1
25 TABLET, EXTENDED RELEASE ORAL DAILY
Status: DISCONTINUED | OUTPATIENT
Start: 2022-07-13 | End: 2022-08-01 | Stop reason: HOSPADM

## 2022-07-12 RX ORDER — LANOLIN ALCOHOL/MO/W.PET/CERES
1000 CREAM (GRAM) TOPICAL DAILY
Status: DISCONTINUED | OUTPATIENT
Start: 2022-07-13 | End: 2022-08-01 | Stop reason: HOSPADM

## 2022-07-12 RX ORDER — LANOLIN ALCOHOL/MO/W.PET/CERES
400 CREAM (GRAM) TOPICAL 2 TIMES DAILY
Status: DISCONTINUED | OUTPATIENT
Start: 2022-07-12 | End: 2022-08-01 | Stop reason: HOSPADM

## 2022-07-12 RX ORDER — METHENAMINE MANDELATE 1000 MG/1
1000 TABLET, FILM COATED ORAL 2 TIMES DAILY
Status: DISCONTINUED | OUTPATIENT
Start: 2022-07-12 | End: 2022-07-12

## 2022-07-12 RX ORDER — ASCORBIC ACID 500 MG
1000 TABLET ORAL 2 TIMES DAILY
Status: DISCONTINUED | OUTPATIENT
Start: 2022-07-12 | End: 2022-08-01 | Stop reason: HOSPADM

## 2022-07-12 RX ORDER — LIDOCAINE 50 MG/G
1 PATCH TOPICAL
Status: DISCONTINUED | OUTPATIENT
Start: 2022-07-12 | End: 2022-08-01 | Stop reason: HOSPADM

## 2022-07-12 RX ORDER — BACLOFEN 10 MG/1
10 TABLET ORAL 2 TIMES DAILY PRN
Status: DISCONTINUED | OUTPATIENT
Start: 2022-07-12 | End: 2022-08-01 | Stop reason: HOSPADM

## 2022-07-12 RX ORDER — TALC
6 POWDER (GRAM) TOPICAL NIGHTLY PRN
Status: DISCONTINUED | OUTPATIENT
Start: 2022-07-12 | End: 2022-08-01 | Stop reason: HOSPADM

## 2022-07-12 RX ORDER — AMLODIPINE BESYLATE 10 MG/1
10 TABLET ORAL DAILY
Status: DISCONTINUED | OUTPATIENT
Start: 2022-07-13 | End: 2022-08-01 | Stop reason: HOSPADM

## 2022-07-12 RX ORDER — OXYCODONE HYDROCHLORIDE 10 MG/1
10 TABLET ORAL EVERY 8 HOURS PRN
Status: DISCONTINUED | OUTPATIENT
Start: 2022-07-12 | End: 2022-07-13

## 2022-07-12 RX ORDER — PANTOPRAZOLE SODIUM 40 MG/1
40 TABLET, DELAYED RELEASE ORAL DAILY
Status: DISCONTINUED | OUTPATIENT
Start: 2022-07-13 | End: 2022-08-01 | Stop reason: HOSPADM

## 2022-07-12 RX ADMIN — DOCUSATE SODIUM - SENNOSIDES 1 TABLET: 50; 8.6 TABLET, FILM COATED ORAL at 02:07

## 2022-07-12 RX ADMIN — AMLODIPINE BESYLATE 10 MG: 5 TABLET ORAL at 09:07

## 2022-07-12 RX ADMIN — OXYCODONE HYDROCHLORIDE 10 MG: 10 TABLET ORAL at 06:07

## 2022-07-12 RX ADMIN — OXYCODONE 10 MG: 5 TABLET ORAL at 09:07

## 2022-07-12 RX ADMIN — CYANOCOBALAMIN TAB 1000 MCG 1000 MCG: 1000 TAB at 09:07

## 2022-07-12 RX ADMIN — METHENAMINE HIPPURATE 1 G: 1 TABLET ORAL at 09:07

## 2022-07-12 RX ADMIN — SODIUM CHLORIDE, PRESERVATIVE FREE 10 ML: 5 INJECTION INTRAVENOUS at 05:07

## 2022-07-12 RX ADMIN — OXYCODONE HYDROCHLORIDE AND ACETAMINOPHEN 1000 MG: 500 TABLET ORAL at 09:07

## 2022-07-12 RX ADMIN — LIDOCAINE 1 PATCH: 50 PATCH CUTANEOUS at 06:07

## 2022-07-12 RX ADMIN — PANTOPRAZOLE SODIUM 40 MG: 40 TABLET, DELAYED RELEASE ORAL at 09:07

## 2022-07-12 RX ADMIN — HEPARIN SODIUM 5000 UNITS: 5000 INJECTION INTRAVENOUS; SUBCUTANEOUS at 01:07

## 2022-07-12 RX ADMIN — Medication 400 MG: at 09:07

## 2022-07-12 RX ADMIN — ATORVASTATIN CALCIUM 40 MG: 40 TABLET, FILM COATED ORAL at 06:07

## 2022-07-12 RX ADMIN — METOPROLOL SUCCINATE 25 MG: 25 TABLET, EXTENDED RELEASE ORAL at 09:07

## 2022-07-12 RX ADMIN — MICONAZOLE NITRATE 2 % TOPICAL POWDER: at 09:07

## 2022-07-12 RX ADMIN — MICONAZOLE NITRATE: 20 POWDER TOPICAL at 08:07

## 2022-07-12 RX ADMIN — OXYCODONE 10 MG: 5 TABLET ORAL at 03:07

## 2022-07-12 RX ADMIN — OXYCODONE 10 MG: 5 TABLET ORAL at 02:07

## 2022-07-12 RX ADMIN — CEFTRIAXONE 1 G: 1 INJECTION, SOLUTION INTRAVENOUS at 09:07

## 2022-07-12 RX ADMIN — HEPARIN SODIUM 5000 UNITS: 5000 INJECTION INTRAVENOUS; SUBCUTANEOUS at 05:07

## 2022-07-12 NOTE — PLAN OF CARE
Problem: Adult Inpatient Plan of Care  Goal: Plan of Care Review  Outcome: Ongoing, Progressing  Goal: Absence of Hospital-Acquired Illness or Injury  Outcome: Ongoing, Progressing     Pt AAOx4. VSS. Meds administered per MAR. Pain controlled with oxy 10mg. No signs of distress. 22G R FA CDI, saline locked. POC reviewed with pt. Bed locked and in lowest position. Table at bedside. WCTM.

## 2022-07-12 NOTE — NURSING
Patient admitted to SNF via [facility]. Son, Ryan notified. Patient c/o pain rated at 8/10. Awaiting orders.

## 2022-07-12 NOTE — PLAN OF CARE
Suzie spoke with Brandee with [facility]. Brandee stated that she is currently reviewing orders for pt. Awaiting report.     Suzie will continue to follow pt for d/c planning.     Future Appointments   Date Time Provider Department Center   7/15/2022  8:00 AM Amy Bright NP [address]   9/1/2022  9:20 AM Ervin Parikh MD [facility] UROLOGY [facility]   9/21/2022 10:40 AM DO TARAH Savage [facility] [place]         07/12/22 1310   Post-Acute Status   Post-Acute Authorization Placement

## 2022-07-12 NOTE — ASSESSMENT & PLAN NOTE
Chronic, improved.  Latest blood pressure and vitals reviewed-   Temp:  [97.9 °F (36.6 °C)-99.5 °F (37.5 °C)]   Pulse:  [76-90]   Resp:  [16-20]   BP: (127-160)/(60-70)   SpO2:  [94 %-100 %] .   Home meds for hypertension were reviewed and noted below.   Hypertension Medications             losartan (COZAAR) 100 MG tablet TAKE 1 TABLET (100 MG TOTAL) BY MOUTH ONCE DAILY.    metoprolol succinate (TOPROL-XL) 25 MG 24 hr tablet Take 0.5 tablets (12.5 mg total) by mouth once daily.          While in the hospital, will manage blood pressure as follows; Adjust home antihypertensive regimen as follows- losartan held due to FLORECITA.  FLORECITA better; however, amlodipine was started.  Increase to amlodipine 10mg daily.  Will increase beta blocker as she has been tachycardic as well.  Monitor closely and adjust as necessary.-doing well with dose adjustments.    Will utilize p.r.n. blood pressure medication only if patient's blood pressure greater than  180/110 and she develops symptoms such as worsening chest pain or shortness of breath.  BP has improved.

## 2022-07-12 NOTE — NURSING
Patient being discharged per orders. Report called to receiving facility. Patient belongings accounted for. Tele monitor removed, cleaned, and returned to tele tech. IV removed without complication and cathter tip intact. Patient being transported via [facility].

## 2022-07-12 NOTE — PROGRESS NOTES
Outpatient Care Management  Plan of Care Follow Up Visit    Patient: Patito Domingo  MRN: [id number]  Date of Service: 07/11/2022  Completed by: Marcie Thrasher RN  Referral Date: 02/10/2022  Program:     Reason for Visit   Patient presents with    OPCM Chart Review     7/11 noted pt in hospital    OPCM RN First Follow-Up Attempt    OPCM RN Second Follow-Up Attempt     7/19    Follow-up    Update Plan Of Care       Brief Summary:   8/2  Contacted pt  Fu on status for [place] program Message to mumtaz fraga lcsw  Pt is in a down stairs apartment no stairs to climb      8/1 noted pt dc from snf will attempt contact .    Resume home health l arm picc line removed on 7/29   skin team to left buttock    Pt in snf anticipate dc home with home health    7/11  Pt to ed with co weakness.  Noted uti again e coli  Pt plan is snf upon dc.  Messaged [facility] and she confirms short stay snf no plans for long term placement      Next steps from previous encounter  Fu on use of vit c , probiotics and methenamine     Fu on appt with neuroendocrine and recommendations for oncology  Fu on symptoms of uti    Interventions  Chart reviewed  Reviewed upcoming appt schedule    Assess/review short term goals met       Next steps  Fu on use of vit c , probiotics and methenamine  Fu on receipt of cushion  Fu on medicaid [place]avier status  Fu on any symptoms of uti  Fu on wound and home health comment about abcess  Present case at case review            Patient Summary     Involvement of Care:  Do I have permission to speak with other family members about your care?       Patient Reported Labs & Vitals:  1.  Any Patient Reported Labs & Vitals?     2.  Patient Reported Blood Pressure:     3.  Patient Reported Pulse:     4.  Patient Reported Weight (Kg):     5.  Patient Reported Blood Glucose (mg/dl):       Medical and social history was reviewed with patient and/or caregiver.     Clinical Assessment     Reviewed and provided basic  information on available community resources for mental health, transportation, wellness resources, and palliative care programs with patient and/or caregiver.     Complex Care Plan     Care plan was discussed and completed today with input from patient and/or caregiver.    Patient Instructions     Instructions were provided via the [facility] patient resources and are available for the patient to view on the patient portal.      Todays OPCM Self-Management Care Plan was developed with the patients/caregivers input and was based on identified barriers from todays assessment.  Goals were written today with the patient/caregiver and the patient has agreed to work towards these goals to improve his/her overall well-being. Patient verbalized understanding of the care plan, goals, and all of today's instructions. Encouraged patient/caregiver to communicate with his/her physician and health care team about health conditions and the treatment plan.  Provided my contact information today and encouraged patient/caregiver to call me with any questions as needed.

## 2022-07-12 NOTE — ASSESSMENT & PLAN NOTE
Blood culture 1 tube growing GNR  Possible seeding from bladder  Was on meropenem  Repeat blood cultures-NGTD  Now on IV rocephin  ID consulted-will need 2 wk course IV rocephin; start date 7/2 - ending on 7/16

## 2022-07-12 NOTE — PT/OT/SLP PROGRESS
Physical Therapy Treatment    Patient Name:  Patito Domingo   MRN:  [id number]    Recommendations:     Discharge Recommendations:  nursing facility, skilled   Discharge Equipment Recommendations:  (TBD)   Barriers to discharge: decreased mobility,strength and endurance    Assessment:     Patito Domingo is a 71 y.o. female admitted with a medical diagnosis of Sepsis.  She presents with the following impairments/functional limitations:  weakness, impaired endurance, impaired functional mobilty, gait instability, impaired balance, decreased lower extremity function, pain, decreased ROM, impaired coordination,pt with good participation and is limited by R hip pain,pt will benefit from SNF upon discharge.    Rehab Prognosis: Fair; patient would benefit from acute skilled PT services to address these deficits and reach maximum level of function.    Recent Surgery: * No surgery found *      Plan:     During this hospitalization, patient to be seen 3 x/week to address the identified rehab impairments via gait training, therapeutic activities, therapeutic exercises, neuromuscular re-education and progress toward the following goals:    · Plan of Care Expires:  08/08/22    Subjective     Chief Complaint: R hip pain  Patient/Family Comments/goals: pt agreeable to rx.  Pain/Comfort:  · Pain Rating 1: 10/10  · Location - Side 1: Right  · Location - Orientation 1: generalized  · Location 1: hip  · Pain Addressed 1: Reposition, Distraction, Cessation of Activity  · Pain Rating Post-Intervention 1: 10/10      Objective:     Communicated with nsg prior to session.  Patient found supine with bed alarm, PureWick, peripheral IV, telemetry upon PT entry to room.     General Precautions: Standard, fall   Orthopedic Precautions:N/A   Braces: N/A  Respiratory Status: Room air     Functional Mobility:  · Bed Mobility:     · Supine to Sit: moderate assistance and maximal assistance  · Sit to Supine: moderate assistance and maximal  assistance  · Transfers:     · Sit to Stand:  minimum assistance with rolling walker  · Balance: fair sitting balance      AM-PAC 6 CLICK MOBILITY  Turning over in bed (including adjusting bedclothes, sheets and blankets)?: 2  Sitting down on and standing up from a chair with arms (e.g., wheelchair, bedside commode, etc.): 3  Moving from lying on back to sitting on the side of the bed?: 2  Moving to and from a bed to a chair (including a wheelchair)?: 2  Need to walk in hospital room?: 1  Climbing 3-5 steps with a railing?: 1  Basic Mobility Total Score: 11       Therapeutic Activities and Exercises: le supine ex's within tolerance with rest periods,pt positioned in L sidelying not supine.       Patient left supine with all lines intact, call button in reach and bed alarm on..    GOALS: see general POC  Multidisciplinary Problems     Physical Therapy Goals        Problem: Physical Therapy    Goal Priority Disciplines Outcome Goal Variances Interventions   Physical Therapy Goal     PT, PT/OT Ongoing, Progressing     Description: Goals to be met by: 2022     Patient will increase functional independence with mobility by performin. Supine to sit with Modified [place]  2. Sit to supine with Modified [place]  3. Rolling with Modified [place].  4. Sit to stand transfer with Supervision with RW/rollator  5. Bed to chair transfer with Supervision using Rolling Walker/rollator  6. Gait  x 75 feet with Supervision using Rolling Walker/rollator.   7. Lower extremity exercise program x10 reps with supervision                     Time Tracking:     PT Received On: 22  PT Start Time: 0824     PT Stop Time: 0850  PT Total Time (min): 26 min     Billable Minutes: Therapeutic Activity 16 and Therapeutic Exercise 10    Treatment Type: Treatment  PT/PTA: PTA     PTA Visit Number: 2     2022

## 2022-07-12 NOTE — PLAN OF CARE
Suzie spoke with Brandee with [facility]. Brandee stated she has received insurance auth. Brandee stated that once a bed becomes available then she will look over everything for pt. Brandee stated she is awaiting for a different pt to discharge from [facility] and then pt should be good to transfer to [facility].     Suzie will continue to follow pt for d/c planning.     Future Appointments   Date Time Provider Department Center   7/15/2022  8:00 AM Amy Bright NP [address]   9/1/2022  9:20 AM Ervin Parikh MD [facility]OLOGY [place] [facility]   9/21/2022 10:40 AM Mariela Wei DO [facility] [place]         07/12/22 1126   Post-Acute Status   Post-Acute Authorization Placement   Post-Acute Placement Status Pending Bed Availability   Discharge Plan   Discharge Plan A Skilled Nursing Facility

## 2022-07-12 NOTE — PLAN OF CARE
Problem: Physical Therapy  Goal: Physical Therapy Goal  Description: Goals to be met by: 2022     Patient will increase functional independence with mobility by performin. Supine to sit with Modified [place]  2. Sit to supine with Modified [place]  3. Rolling with Modified [place].  4. Sit to stand transfer with Supervision with RW/rollator  5. Bed to chair transfer with Supervision using Rolling Walker/rollator  6. Gait  x 75 feet with Supervision using Rolling Walker/rollator.   7. Lower extremity exercise program x10 reps with supervision    Outcome: Ongoing, Progressing      MD note    Pt sent [employer]. In  with dental infection.   On review bp low end . On abx with planned extraction.     Can you please call and check on pt.? Be sure no dizziness and no fever.     Thank you

## 2022-07-12 NOTE — ASSESSMENT & PLAN NOTE
Urine culture from 5/15/22 growing ESBL Klebsiella  Meropenem  Urine culture growing GNR, e coli  Febrile overnight.  Continue IV abx and monitor  Continued fevers. ID consult   Now on IV Rocephin as per sensitivity-will need 2 wk course  IV rocephin end date 7/16

## 2022-07-12 NOTE — PROGRESS NOTES
IP Liaison - Final Visit Note    Patient: Patito Domingo  MRN:  [id number]  Date of Service:  7/12/2022  Completed by:  MADHURI Matias    Reason for Visit   Patient presents with    IP Liaison Follow-up Visit        Patient Summary     Discharge Date: 07/12/2022  Discharge telephone number/address: [phone] / [facility]  Follow up provider: n/a  Follow up appointments: n/a  Home Health agency & telephone number: n/a  DME ordered &  name: n/a  Assigned OPCM RN/SW: n/a  Report sent to follow up team (PCP/OPCM) via in basket message: n/a  Community Resources arranged including agency name & contact info: [facility]      MADHURI Matias

## 2022-07-12 NOTE — PLAN OF CARE
D/c orders noted.     Set up completed for pt to transfer to [facility]. Rn can call report to [phone].     Sw met with pt to discuss d/c plans. Pt is agreeable to transfer to [facility]. Suzie completed patient choice form with pt.     Suzie spoke with pt's son Ryan [phone] to discuss d/c plans. Sw informed Ryan that pt will be transferring to [facility] today. Ryan is agreeable for pt to transfer to [facility].     Suzie set up ambulance transportation for 4:00p.m.     Pt is cleared to go from CM standpoint.     Future Appointments   Date Time Provider Department Center   7/15/2022  8:00 AM Amy Bright NP [address]   9/1/2022  9:20 AM Ervin Parikh MD [facility]OLOGY [place] [facility]   9/21/2022 10:40 AM Mariela Wei DO [facility] [place]         07/12/22 1348   Final Note   Assessment Type Final Discharge Note   Anticipated Discharge Disposition SNF  ([facility])   What phone number can be called within the next 1-3 days to see how you are doing after discharge? [phone]   Hospital Resources/Appts/Education Provided Appointments scheduled by Navigator/Coordinator   Post-Acute Status   Post-Acute Authorization Placement   Post-Acute Placement Status Set-up Complete/Auth obtained   Coverage [employer] Managed Medicare   Discharge Delays None known at this time

## 2022-07-12 NOTE — PROGRESS NOTES
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 She has also noticed abd pain, n/v, increased urinary frequency/urgency and fell at home last night but denies LOC. Pt denies chest pain, sob, palpitations, diarrhea, constipation. Was prescribed keflex yesterday for UTI. Pt noted to be very somnolent and minimally conversant upon arrival to ED. Workup significant for +UA, leukocytosis, lactic acidosis, FLORECITA, elevated troponin. CT abd/pelvis with no acute findings; unchanged size of carcinoid tumor with liver mets and lymphadenopathy. Started on meropenem for UTI due to recent urine culture growing ESBL klebsiella. Mental status has since improved and patient now alert and oriented x4. Patient will be admitted to hospital medicine service for further management.       Overview/Hospital Course:  Patito Domingo was monitored closely during her hospitalization.  She was admitted with a diagnosis of sepsis 2/2 UTI, FLORECITA and non-traumatic rhabdomyolysis.  Urine and blood cultures were obtained and she was placed on IV meropenem.  Her admitting troponin was elevated and cardiology was consulted.  An echocardiogram was obtained revealing normal ejection fraction with normal wall motion.  Her troponin trended down.  She was given gentle IVF and her FLORECITA as well as rhabdomyolysis improved.  Her blood culture was positive for GNR, and repeat cultures were drawn.  Urine culture was positive for ESCHERICHIA COLI, sensitive to Rocephin and abx were  "de-escalated from meropenem to IV rocephin.  PT/OT were consulted.  Pt c/o right hip pain so an xray was obtained which was negative for fracture.  She c/o swelling to RLE.  An ultrasound of the BLE was performed which showed no evidence of DVT.  She developed intermittent low grade fevers despite abx therapy and ID was consulted.  Per ID, presence of large bilateral renal stones complicates picture, although non-obstructive.  Previously assessed by urology, but decided against stone retrieval due to risks of percutaneous nephrolithotomy, and stones not clearly causing UTIs.  ID recommendations are for IV rocephin 1gm q24h x 2 weeks, start date 7/2/22.  Plan DC to SNF with PICC or midline for continued abx treatment.   to arrange.  Pt accepted to [facility].        Interval History: See "hospital course"      Review of Systems   Constitutional:  Positive for activity change and fatigue. Negative for chills, diaphoresis and fever.   HENT:  Negative for congestion, postnasal drip, rhinorrhea, sinus pressure, sinus pain and sneezing.    Respiratory:  Negative for cough, chest tightness, shortness of breath, wheezing and stridor.    Cardiovascular:  Negative for chest pain, palpitations and leg swelling.   Gastrointestinal:  Negative for abdominal distention, abdominal pain, blood in stool, constipation, diarrhea, nausea and vomiting.   Endocrine: Negative for cold intolerance and heat intolerance.   Genitourinary:  Negative for dysuria, flank pain, frequency, hematuria and urgency.   Musculoskeletal:  Negative for gait problem.   Skin:  Positive for rash.   Neurological:  Positive for weakness (generalized). Negative for dizziness.   Psychiatric/Behavioral:  Positive for dysphoric mood. Negative for agitation and behavioral problems.    All other systems reviewed and are negative.  Objective:     Vital Signs (Most Recent):  Temp: 97.9 °F (36.6 °C) (07/11/22 1610)  Pulse: 83 (07/11/22 2009)  Resp: 18 " (07/11/22 2007)  BP: 127/61 (07/11/22 2009)  SpO2: 96 % (07/11/22 2007) Vital Signs (24h Range):  Temp:  [97.9 °F (36.6 °C)-99.5 °F (37.5 °C)] 97.9 °F (36.6 °C)  Pulse:  [76-90] 83  Resp:  [16-20] 18  SpO2:  [94 %-100 %] 96 %  BP: (127-160)/(60-70) 127/61     Weight: 79.6 kg (175 lb 7.8 oz)  Body mass index is 28.32 kg/m².    Intake/Output Summary (Last 24 hours) at 7/11/2022 2129  Last data filed at 7/11/2022 1644  Gross per 24 hour   Intake 510.86 ml   Output 300 ml   Net 210.86 ml      Physical Exam  Constitutional:       General: She is not in acute distress.     Appearance: She is well-developed. She is not ill-appearing or toxic-appearing.   HENT:      Head: Normocephalic and atraumatic.   Neck:      Vascular: No JVD.   Cardiovascular:      Rate and Rhythm: Tachycardia present.   Pulmonary:      Effort: Pulmonary effort is normal. No respiratory distress.   Abdominal:      Tenderness: Tenderness: diffuse.   Musculoskeletal:         General: Normal range of motion.      Cervical back: Normal range of motion and neck supple.   Skin:     Findings: Rash (groin area) present.   Neurological:      General: No focal deficit present.      Mental Status: She is alert and oriented to person, place, and time. Mental status is at baseline.      Cranial Nerves: No cranial nerve deficit.   Psychiatric:         Mood and Affect: Mood normal.         Behavior: Behavior normal.         Thought Content: Thought content normal.       Significant Labs: All pertinent labs within the past 24 hours have been reviewed.  BMP:   Recent Labs   Lab 07/11/22  0801         K 3.7      CO2 23   BUN 18   CREATININE 0.8   CALCIUM 8.0*       Significant Imaging: I have reviewed all pertinent imaging results/findings within the past 24 hours.      Assessment/Plan:      * Sepsis  This patient does have evidence of infective focus  My overall impression is sepsis. Vital signs were reviewed and noted in progress note.  Antibiotics  given-   Antibiotics (From admission, onward)            Start     Stop Route Frequency Ordered    07/07/22 1000  cefTRIAXone (ROCEPHIN) 1 g/50 mL D5W IVPB         -- IV Every 24 hours (non-standard times) 07/07/22 0852        Cultures were taken-   Microbiology Results (last 7 days)     Procedure Component Value Units Date/Time    Blood culture [[id number]] Collected: 07/07/22 1734    Order Status: Completed Specimen: Blood Updated: 07/10/22 0612     Blood Culture, Routine No Growth to date      No Growth to date      No Growth to date    Narrative:      Collection has been rescheduled by [email or web address] at 07/07/2022 15:04 Reason:   Unable to collect both sets of cultures  Collection has been rescheduled by [email or web address] at 07/07/2022 15:04 Reason:   Unable to collect both sets of cultures    Blood culture [[id number]] Collected: 07/07/22 1502    Order Status: Completed Specimen: Blood Updated: 07/10/22 0612     Blood Culture, Routine No Growth to date      No Growth to date      No Growth to date    Blood culture [[id number]] Collected: 07/04/22 1008    Order Status: Completed Specimen: Blood Updated: 07/09/22 1422     Blood Culture, Routine No growth after 5 days.    Blood culture [[id number]] Collected: 07/04/22 1008    Order Status: Completed Specimen: Blood Updated: 07/09/22 1422     Blood Culture, Routine No growth after 5 days.    Blood culture #1 **CANNOT BE ORDERED STAT** [[id number]] Collected: 07/02/22 2213    Order Status: Completed Specimen: Blood Updated: 07/08/22 1212     Blood Culture, Routine No growth after 5 days.    Blood culture [[id number]]     Order Status: Sent Specimen: Blood     Blood culture #2 **CANNOT BE ORDERED STAT** [[id number]]  (Abnormal)  (Susceptibility) Collected: 07/02/22 2213    Order Status: Completed Specimen: Blood Updated: 07/06/22 1011     Blood Culture, Routine Gram stain aer bottle: Gram negative rods       Results called to and read back by: Renee Lancaster RN  07/04/2022  01:29      ESCHERICHIA  COLI    Urine culture [[id number]]  (Abnormal)  (Susceptibility) Collected: 07/02/22 1948    Order Status: Completed Specimen: Urine Updated: 07/05/22 0923     Urine Culture, Routine ESCHERICHIA COLI  >100,000 cfu/ml      Narrative:      Specimen Source->Urine        Latest lactate reviewed, they are-  No results for input(s): LACTATE in the last 72 hours.    Organ dysfunction indicated by Acute kidney injury and Encephalopathy   Source- Urine    Source control Achieved by- Antibiotics for UTI coverage.     Continue meropenem given history of ESBL  Given 2L bolus in ER; will continue with NS at 100 ml/hr given CHF history  Repeat LA and troponin   Resolved          Palliative care encounter  See palliative care note      Bacteremia  Blood culture 1 tube growing GNR  Possible seeding from bladder  Cont meropenem  Repeat blood cultures-NGTD  Now on IV rocephin  ID consulted-will need 2 wk course IV rocephin; start date 7/2      Non-traumatic rhabdomyolysis  CK 1842  FLORECITA possibly a complication of rhabdo, although CK not significantly elevated  Received fluid bolus due to sepsis  Hold further fluid due to HF  Trend CK-slowly decreasing  Gentle fluids overnight and reassess in am  Continued improvement      Lactic acidosis  Due to sepsis  Improving      NSTEMI (non-ST elevated myocardial infarction)  Trop trending down. No EKG changes or c/o chest pain  TTE reviewed.  Cardiology consulted        Chronic diastolic (congestive) heart failure  No signs of decompensation  Cont metoprolol  TTE showing grade I diastolic dysfunction  Monitor I&O         FLORECITA (acute kidney injury)  Lab Results   Component Value Date    CREATININE 0.9 07/10/2022     Sr Cr elevated secondary to sepsis  Cont to monitor with daily labs   Avoid nephrotoxins.   Renally dose all medications   Monitor events that may lead to decreased renal perfusion (hypovolemia, hypotension, sepsis).   Monitor urine output (goal 0.5 mL/kg/hr) to assure that no  obstruction precipitates worsening in GFR.  Serum bicarb level 19. Anticipate improvement with sepsis management.   FLORECITA possibly due to sepsis vs rhabdo  Gentle IVFH-improving  resolved      HLD (hyperlipidemia)  Continue atorvastatin     Anemia of chronic disease  Patient's anemia is currently stable. S/p 0 units of PRBCs.  Current CBC reviewed-   Lab Results   Component Value Date    HGB 8.9 (L) 07/10/2022    HCT 29.7 (L) 07/10/2022    MCV 86 07/10/2022     07/10/2022     Monitor serial CBC and transfuse if patient becomes hemodynamically unstable, symptomatic or H/H drops below 7/21.         Essential hypertension  Chronic, improved.  Latest blood pressure and vitals reviewed-   Temp:  [97.9 °F (36.6 °C)-99.5 °F (37.5 °C)]   Pulse:  [76-90]   Resp:  [16-20]   BP: (127-160)/(60-70)   SpO2:  [94 %-100 %] .   Home meds for hypertension were reviewed and noted below.   Hypertension Medications             losartan (COZAAR) 100 MG tablet TAKE 1 TABLET (100 MG TOTAL) BY MOUTH ONCE DAILY.    metoprolol succinate (TOPROL-XL) 25 MG 24 hr tablet Take 0.5 tablets (12.5 mg total) by mouth once daily.          While in the hospital, will manage blood pressure as follows; Adjust home antihypertensive regimen as follows- losartan held due to FLORECITA.  FLORECITA better; however, amlodipine was started.  Increase to amlodipine 10mg daily.  Will increase beta blocker as she has been tachycardic as well.  Monitor closely and adjust as necessary.-doing well with dose adjustments.    Will utilize p.r.n. blood pressure medication only if patient's blood pressure greater than  180/110 and she develops symptoms such as worsening chest pain or shortness of breath.  BP has improved.        Acute cystitis with hematuria  Urine culture from 5/15/22 growing ESBL Klebsiella  Meropenem  Urine culture growing GNR, e coli  Febrile overnight.  Continue IV abx and monitor  Continued fevers. ID consult   Now on IV Rocephin as per sensitivity-will need  2 wk course  IV rocephin end date 7/16      Metastatic carcinoid tumor  ER physician discussed the case with Dr. Perez with neuroendocrine services  Continue follow up outpatient as scheduled       VTE Risk Mitigation (From admission, onward)         Ordered     heparin (porcine) injection 5,000 Units  Every 8 hours         07/03/22 0058     IP VTE HIGH RISK PATIENT  Once         07/03/22 0058     Place sequential compression device  Until discontinued         07/03/22 0058                      I have assessed these finding virtually using telemed platform and with assistance of bedside nurse                 The attending portion of this evaluation, treatment, and documentation was performed per BERENICE Cook via Telemedicine AudioVisual using the secure [employer] software platform with 2 way audio/video. The provider was located off-site and the patient is located in the hospital. The aforementioned video software was utilized to document the relevant history and physical exam    BERENICE Cook  Department of Hospital Medicine   [facility]

## 2022-07-12 NOTE — ASSESSMENT & PLAN NOTE
CK 1842  FLORECITA possibly a complication of rhabdo, although CK not significantly elevated  Received fluid bolus due to sepsis  Hold further fluid due to HF  Resolved with IVF

## 2022-07-12 NOTE — PLAN OF CARE
Suzie spoke with Brandee with [facility]. Per Brandee, still awaiting insurance auth.     Suzie spoke with Brenda [phone] with [employer]. Per Brenda, pt has received the insurance auth. Brenda stated she will fax over insurance auth to Brandee with [facility].     Suzie will continue to follow pt for d/c planning.        07/12/22 0841   Post-Acute Status   Post-Acute Authorization Placement

## 2022-07-12 NOTE — SUBJECTIVE & OBJECTIVE
"Interval History: See "hospital course"      Review of Systems   Constitutional:  Positive for activity change and fatigue. Negative for chills, diaphoresis and fever.   HENT:  Negative for congestion, postnasal drip, rhinorrhea, sinus pressure, sinus pain and sneezing.    Respiratory:  Negative for cough, chest tightness, shortness of breath, wheezing and stridor.    Cardiovascular:  Negative for chest pain, palpitations and leg swelling.   Gastrointestinal:  Negative for abdominal distention, abdominal pain, blood in stool, constipation, diarrhea, nausea and vomiting.   Endocrine: Negative for cold intolerance and heat intolerance.   Genitourinary:  Negative for dysuria, flank pain, frequency, hematuria and urgency.   Musculoskeletal:  Negative for gait problem.   Skin:  Positive for rash.   Neurological:  Positive for weakness (generalized). Negative for dizziness.   Psychiatric/Behavioral:  Positive for dysphoric mood. Negative for agitation and behavioral problems.    All other systems reviewed and are negative.  Objective:     Vital Signs (Most Recent):  Temp: 97.9 °F (36.6 °C) (07/11/22 1610)  Pulse: 83 (07/11/22 2009)  Resp: 18 (07/11/22 2007)  BP: 127/61 (07/11/22 2009)  SpO2: 96 % (07/11/22 2007) Vital Signs (24h Range):  Temp:  [97.9 °F (36.6 °C)-99.5 °F (37.5 °C)] 97.9 °F (36.6 °C)  Pulse:  [76-90] 83  Resp:  [16-20] 18  SpO2:  [94 %-100 %] 96 %  BP: (127-160)/(60-70) 127/61     Weight: 79.6 kg (175 lb 7.8 oz)  Body mass index is 28.32 kg/m².    Intake/Output Summary (Last 24 hours) at 7/11/2022 2129  Last data filed at 7/11/2022 1644  Gross per 24 hour   Intake 510.86 ml   Output 300 ml   Net 210.86 ml      Physical Exam  Constitutional:       General: She is not in acute distress.     Appearance: She is well-developed. She is not ill-appearing or toxic-appearing.   HENT:      Head: Normocephalic and atraumatic.   Neck:      Vascular: No JVD.   Cardiovascular:      Rate and Rhythm: Tachycardia present. "   Pulmonary:      Effort: Pulmonary effort is normal. No respiratory distress.   Abdominal:      Tenderness: Tenderness: diffuse.   Musculoskeletal:         General: Normal range of motion.      Cervical back: Normal range of motion and neck supple.   Skin:     Findings: Rash (groin area) present.   Neurological:      General: No focal deficit present.      Mental Status: She is alert and oriented to person, place, and time. Mental status is at baseline.      Cranial Nerves: No cranial nerve deficit.   Psychiatric:         Mood and Affect: Mood normal.         Behavior: Behavior normal.         Thought Content: Thought content normal.       Significant Labs: All pertinent labs within the past 24 hours have been reviewed.  BMP:   Recent Labs   Lab 07/11/22  0801         K 3.7      CO2 23   BUN 18   CREATININE 0.8   CALCIUM 8.0*       Significant Imaging: I have reviewed all pertinent imaging results/findings within the past 24 hours.

## 2022-07-12 NOTE — ASSESSMENT & PLAN NOTE
This patient does have evidence of infective focus  My overall impression is sepsis. Vital signs were reviewed and noted in progress note.  Antibiotics given-   Antibiotics (From admission, onward)            Start     Stop Route Frequency Ordered    07/07/22 1000  cefTRIAXone (ROCEPHIN) 1 g/50 mL D5W IVPB         -- IV Every 24 hours (non-standard times) 07/07/22 0852        Cultures were taken-   Microbiology Results (last 7 days)     Procedure Component Value Units Date/Time    Blood culture [[id number]] Collected: 07/07/22 1502    Order Status: Completed Specimen: Blood Updated: 07/12/22 0612     Blood Culture, Routine No Growth to date      No Growth to date      No Growth to date      No Growth to date      No Growth to date    Blood culture [[id number]] Collected: 07/07/22 1734    Order Status: Completed Specimen: Blood Updated: 07/12/22 0612     Blood Culture, Routine No Growth to date      No Growth to date      No Growth to date      No Growth to date      No Growth to date    Narrative:      Collection has been rescheduled by [email or web address] at 07/07/2022 15:04 Reason:   Unable to collect both sets of cultures  Collection has been rescheduled by [email or web address] at 07/07/2022 15:04 Reason:   Unable to collect both sets of cultures    Blood culture [[id number]] Collected: 07/04/22 1008    Order Status: Completed Specimen: Blood Updated: 07/09/22 1422     Blood Culture, Routine No growth after 5 days.    Blood culture [[id number]] Collected: 07/04/22 1008    Order Status: Completed Specimen: Blood Updated: 07/09/22 1422     Blood Culture, Routine No growth after 5 days.    Blood culture #1 **CANNOT BE ORDERED STAT** [[id number]] Collected: 07/02/22 2213    Order Status: Completed Specimen: Blood Updated: 07/08/22 1212     Blood Culture, Routine No growth after 5 days.    Blood culture [[id number]]     Order Status: Sent Specimen: Blood     Blood culture #2 **CANNOT BE ORDERED STAT** [[id number]]  (Abnormal)  (Susceptibility) Collected:  07/02/22 2213    Order Status: Completed Specimen: Blood Updated: 07/06/22 1011     Blood Culture, Routine Gram stain aer bottle: Gram negative rods       Results called to and read back by: Renee Lancaster RN  07/04/2022  01:29      ESCHERICHIA COLI        Latest lactate reviewed, they are-  No results for input(s): LACTATE in the last 72 hours.    Organ dysfunction indicated by Acute kidney injury and Encephalopathy   Source- Urine    Source control Achieved by- Antibiotics for UTI coverage.     Continue meropenem given history of ESBL  Transitioned to antibiotics above   Sepsis Resolved

## 2022-07-12 NOTE — PLAN OF CARE
NURSING HOME ORDERS    07/12/2022  [facility]  [address] [address]  Dept: [phone]  Loc: [phone]     Admit to Nursing Home:  Skilled Nursing Facility    Diagnoses:  Active Hospital Problems    Diagnosis  POA    *Sepsis [A41.9]  Yes    Palliative care encounter [Z51.5]  Not Applicable    Non-traumatic rhabdomyolysis [M62.82]  Yes    Bacteremia [R78.81]  Yes    NSTEMI (non-ST elevated myocardial infarction) [I21.4]  Yes    Lactic acidosis [E87.2]  Yes    Chronic diastolic (congestive) heart failure [I50.32]  Yes    FLORECITA (acute kidney injury) [N17.9]  Yes    HLD (hyperlipidemia) [E78.5]  Yes    Essential hypertension [I10]  Yes     Chronic     bp well controlled on current       Anemia of chronic disease [D63.8]  Yes     Chronic     Formatting of this note might be different from the original.  Last Assessment & Plan:   The patient's H/H is stable and consistent with previous laboratory measurements, and the patient exhibits no signs or symptoms of acute bleeding; there is no indication for transfusion.  Will continue to monitor.     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       Patient is homebound due to:  Sepsis    Allergies:  Review of patient's allergies indicates:   Allergen Reactions    Contrast media Hives, Itching and Swelling    Epinephrine Anaphylaxis     Can cause  a Carcinoid Crisis    Ibuprofen Hives, Itching and Swelling    Zofran [ondansetron hcl] Itching     And multiple other reactions    Iodinated contrast media     Morphine Other (See Comments)    Sulfa (sulfonamide antibiotics) Hives, Itching and Swelling       Vitals:  Every shift    Diet: cardiac diet    Activities:   Activity as tolerated    Goals of Care Treatment Preferences:  Code Status: Full Code      Labs:   As per facility protocol     Nursing Precautions:  Fall and Pressure ulcer prevention     May d/c midline upon completion of antibiotic treatment     Consults:     PT to evaluate and treat   OT to evaluate and treat        Medications: Discontinue all previous medication orders, if any. See new list below.     Medication List      START taking these medications    amLODIPine 10 MG tablet  Commonly known as: NORVASC  Take 1 tablet (10 mg total) by mouth once daily.     cefTRIAXone 1 g/50 mL Pgbk IVPB  Commonly known as: ROCEPHIN  Inject 50 mLs (1 g total) into the vein once daily. End date 7/16/22 for 5 days     miconazole NITRATE 2 % 2 % top powder  Commonly known as: MICOTIN  Apply topically 2 (two) times daily. for 10 days     senna-docusate 8.6-50 mg 8.6-50 mg per tablet  Commonly known as: PERICOLACE  Take 1 tablet by mouth 2 (two) times daily.        CHANGE how you take these medications    metoprolol succinate 25 MG 24 hr tablet  Commonly known as: TOPROL-XL  Take 1 tablet (25 mg total) by mouth 2 (two) times daily.  What changed:   · how much to take  · when to take this        CONTINUE taking these medications    ascorbic acid (vitamin C) 1000 MG tablet  Commonly known as: VITAMIN C  Take 1 tablet (1,000 mg total) by mouth 2 (two) times daily.     atorvastatin 40 MG tablet  Commonly known as: LIPITOR  Take 1 tablet (40 mg total) by mouth every evening.     baclofen 10 MG tablet  Commonly known as: LIORESAL  Take 1 tablet (10 mg total) by mouth 2 (two) times daily as needed (muscle cramps).     cyanocobalamin 1000 MCG tablet  Commonly known as: VITAMIN B-12  Take 1 tablet (1,000 mcg total) by mouth once daily.     dicyclomine 20 mg tablet  Commonly known as: BENTYL  TAKE 1 TABLET(20 MG) BY MOUTH THREE TIMES DAILY AS NEEDED FOR ABDOMINAL PAIN     gabapentin 300 MG capsule  Commonly known as: NEURONTIN  Take 1 capsule (300 mg total) by mouth every evening.     LIDOcaine 5 %  Commonly known as: LIDODERM  Place 1  patch onto the skin once daily. Remove & Discard patch within 12 hours or as directed by MD     magnesium oxide 400 mg (241.3 mg magnesium) tablet  Commonly known as: MAG-OX  Take 1 tablet (400 mg total) by mouth 2 (two) times daily.     meclizine 25 mg tablet  Commonly known as: ANTIVERT  Take 1 tablet (25 mg total) by mouth 3 (three) times daily as needed for Dizziness.     methenamine 1 gram Tab  Commonly known as: HIPREX  Take 1 tablet (1 g total) by mouth 2 (two) times daily.     oxyCODONE 10 mg Tab immediate release tablet  Commonly known as: ROXICODONE  Take 1 tablet (10 mg total) by mouth every 8 (eight) hours as needed for Pain.     pantoprazole 40 MG tablet  Commonly known as: PROTONIX  Take 1 tablet (40 mg total) by mouth once daily.        STOP taking these medications    cephALEXin 500 MG capsule  Commonly known as: KEFLEX     diclofenac sodium 1 % Gel  Commonly known as: VOLTAREN     diphenoxylate-atropine 2.5-0.025 mg 2.5-0.025 mg per tablet  Commonly known as: LOMOTIL     losartan 100 MG tablet  Commonly known as: COZAAR        ASK your doctor about these medications    phenazopyridine 100 MG tablet  Commonly known as: PYRIDIUM  Take 1 tablet (100 mg total) by mouth 3 (three) times daily as needed for Pain.  Ask about: Should I take this medication?              Immunizations Administered as of 7/12/2022  Reviewed on 7/12/2022    Name Date Dose VIS Date Route Exp Date    COVID-19, MRNA, LN-S, PF (Pfizer) (Purple Cap) 8/14/2021 -- -- Intramuscular --    Site: Right arm     : [employer]     Lot: [id number]     COVID-19, MRNA, LN-S, PF (Pfizer) (Purple Cap) 7/25/2021 -- -- -- --    Lot: [id number]     External: Auto Reconciled From Outside Source                _________________________________  Karen Garcia MD  07/12/2022

## 2022-07-12 NOTE — ASSESSMENT & PLAN NOTE
Trop trending down. No EKG changes or c/o chest pain  TTE reviewed.  Cardiology was consulted   Resolved with no further issues

## 2022-07-12 NOTE — DISCHARGE SUMMARY
[facility] Medicine  Discharge Summary      Patient Name: Patito Domingo  MRN: [id number]  Patient Class: IP- Inpatient  Admission Date: 7/2/2022  Hospital Length of Stay: 10 days  Discharge Date and Time: 7/12/22  Attending Physician: No att. 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 She has also noticed abd pain, n/v, increased urinary frequency/urgency and fell at home last night but denies LOC. Pt denies chest pain, sob, palpitations, diarrhea, constipation. Was prescribed keflex yesterday for UTI. Pt noted to be very somnolent and minimally conversant upon arrival to ED. Workup significant for +UA, leukocytosis, lactic acidosis, FLORECITA, elevated troponin. CT abd/pelvis with no acute findings; unchanged size of carcinoid tumor with liver mets and lymphadenopathy. Started on meropenem for UTI due to recent urine culture growing ESBL klebsiella. Mental status has since improved and patient now alert and oriented x4. Patient will be admitted to hospital medicine service for further management.       * No surgery found *      Hospital Course:   Patito Domingo was monitored closely during her hospitalization.  She was admitted with a diagnosis of sepsis 2/2 UTI, FLORECITA and non-traumatic rhabdomyolysis.  Urine and blood cultures were obtained and she was placed on IV meropenem.  Her admitting troponin was elevated and cardiology was consulted.  An echocardiogram was obtained revealing normal ejection fraction with normal wall motion.  Her troponin trended down.  She was given gentle IVF and her FLORECITA as well as rhabdomyolysis improved.  Her blood culture was positive for GNR, and repeat cultures were drawn.  Urine culture was positive for ESCHERICHIA  COLI, sensitive to Rocephin and abx were de-escalated from meropenem to IV rocephin.  PT/OT were consulted.  Pt c/o right hip pain so an xray was obtained which was negative for fracture.  She c/o swelling to RLE.  An ultrasound of the BLE was performed which showed no evidence of DVT.  She developed intermittent low grade fevers despite abx therapy and ID was consulted.  Per ID, presence of large bilateral renal stones complicates picture, although non-obstructive.  Previously assessed by urology, but decided against stone retrieval due to risks of percutaneous nephrolithotomy, and stones not clearly causing UTIs.  ID recommendations are for IV rocephin 1gm q24h x 2 weeks, start date 7/2/22.  Plan DC to SNF with PICC or midline for continued abx treatment.   to arrange.  Pt accepted to [facility] and discharge there for rehab and completion of IV antibiotics.          Goals of Care Treatment Preferences:  Code Status: Full Code      Consults:   Consults (From admission, onward)        Status Ordering Provider     Inpatient consult to PICC team ([facility])  Once        Provider:  (Not yet assigned)    Acknowledged REAL ALBA     Inpatient consult to Midline team  Once        Provider:  (Not yet assigned)    Acknowledged REAL ALBA     Inpatient consult to Palliative Care  Once        Provider:  (Not yet assigned)    Completed REAL ALBA     Inpatient consult to Anesthesiology  Once        Provider:  (Not yet assigned)    Acknowledged LUZ NICK     Inpatient consult to Registered Dietitian/Nutritionist  Once        Provider:  (Not yet assigned)    Completed LUZ NICK     Inpatient consult to Infectious Diseases  Once        Provider:  (Not yet assigned)    Completed REAL ALBA     Inpatient virtual consult to Hospital Medicine  Once        Provider:  (Not yet assigned)    Completed CYDNEY VIVAR     Inpatient consult to Cardiology-[facility]  Once         Provider:  Bert Bassett MD    Completed CYDNEY VIVAR consult to case management  Once        Provider:  (Not yet assigned)    Acknowledged MARYCHUY BRITTON          * Sepsis  This patient does have evidence of infective focus  My overall impression is sepsis. Vital signs were reviewed and noted in progress note.  Antibiotics given-   Antibiotics (From admission, onward)            Start     Stop Route Frequency Ordered    07/07/22 1000  cefTRIAXone (ROCEPHIN) 1 g/50 mL D5W IVPB         -- IV Every 24 hours (non-standard times) 07/07/22 0852        Cultures were taken-   Microbiology Results (last 7 days)     Procedure Component Value Units Date/Time    Blood culture [[id number]] Collected: 07/07/22 1502    Order Status: Completed Specimen: Blood Updated: 07/12/22 0612     Blood Culture, Routine No Growth to date      No Growth to date      No Growth to date      No Growth to date      No Growth to date    Blood culture [[id number]] Collected: 07/07/22 1734    Order Status: Completed Specimen: Blood Updated: 07/12/22 0612     Blood Culture, Routine No Growth to date      No Growth to date      No Growth to date      No Growth to date      No Growth to date    Narrative:      Collection has been rescheduled by [email or web address] at 07/07/2022 15:04 Reason:   Unable to collect both sets of cultures  Collection has been rescheduled by [email or web address] at 07/07/2022 15:04 Reason:   Unable to collect both sets of cultures    Blood culture [[id number]] Collected: 07/04/22 1008    Order Status: Completed Specimen: Blood Updated: 07/09/22 1422     Blood Culture, Routine No growth after 5 days.    Blood culture [[id number]] Collected: 07/04/22 1008    Order Status: Completed Specimen: Blood Updated: 07/09/22 1422     Blood Culture, Routine No growth after 5 days.    Blood culture #1 **CANNOT BE ORDERED STAT** [[id number]] Collected: 07/02/22 2213    Order Status: Completed Specimen: Blood Updated: 07/08/22 1212     Blood Culture,  Routine No growth after 5 days.    Blood culture [[id number]]     Order Status: Sent Specimen: Blood     Blood culture #2 **CANNOT BE ORDERED STAT** [[id number]]  (Abnormal)  (Susceptibility) Collected: 07/02/22 2213    Order Status: Completed Specimen: Blood Updated: 07/06/22 1011     Blood Culture, Routine Gram stain aer bottle: Gram negative rods       Results called to and read back by: Renee Lancaster RN  07/04/2022  01:29      ESCHERICHIA COLI        Latest lactate reviewed, they are-  No results for input(s): LACTATE in the last 72 hours.    Organ dysfunction indicated by Acute kidney injury and Encephalopathy   Source- Urine    Source control Achieved by- Antibiotics for UTI coverage.     Continue meropenem given history of ESBL  Transitioned to antibiotics above   Sepsis Resolved          Bacteremia  Blood culture 1 tube growing GNR  Possible seeding from bladder  Was on meropenem  Repeat blood cultures-NGTD  Now on IV rocephin  ID consulted-will need 2 wk course IV rocephin; start date 7/2 - ending on 7/16      Non-traumatic rhabdomyolysis  CK 1842  FLORECITA possibly a complication of rhabdo, although CK not significantly elevated  Received fluid bolus due to sepsis  Hold further fluid due to HF  Resolved with IVF      NSTEMI (non-ST elevated myocardial infarction)  Trop trending down. No EKG changes or c/o chest pain  TTE reviewed.  Cardiology was consulted   Resolved with no further issues        Chronic pain syndrome  Continue prior pain meds        Final Active Diagnoses:    Diagnosis Date Noted POA    PRINCIPAL PROBLEM:  Sepsis [A41.9] 07/03/2022 Yes    Palliative care encounter [Z51.5] 07/11/2022 Not Applicable    Non-traumatic rhabdomyolysis [M62.82] 07/04/2022 Yes    Bacteremia [R78.81] 07/04/2022 Yes    NSTEMI (non-ST elevated myocardial infarction) [I21.4] 07/03/2022 Yes    Lactic acidosis [E87.2] 07/03/2022 Yes    Chronic diastolic (congestive) heart failure [I50.32] 03/02/2022 Yes    FLORECITA (acute  kidney injury) [N17.9]  Yes    HLD (hyperlipidemia) [E78.5] 03/08/2021 Yes    Essential hypertension [I10] 11/19/2019 Yes     Chronic    Anemia of chronic disease [D63.8] 11/19/2019 Yes     Chronic    Chronic pain syndrome [G89.4] 12/02/2018 Yes    Acute cystitis with hematuria [N30.01] 10/08/2018 Yes    Metastatic carcinoid tumor [C7B.00] 01/27/2015 Yes     Chronic      Problems Resolved During this Admission:       Discharged Condition: good    Disposition: Skilled Nursing Facility    Follow Up:   Follow-up Information     [facility] on [address] Call.    Why: to apply for utility assistance and additional services provided through [employer].  Contact information:  Phone: [phone]           Mariela Wei DO Follow up in 1 week(s).    Specialty: Family Medicine  Contact information:  [address] [facility] [id number] [phone]             Gonzales Bishop MD .    Specialty: Hematology and Oncology  Contact information:  [address] [facility] [address]  [address]  [facility] [id number] [phone]             Bert Bassett MD Follow up in 2 week(s).    Specialties: Cardiology, Interventional Cardiology  Contact information:  [address]  [address]  [facility] [address] [phone]                       Patient Instructions:      Diet Cardiac     Activity as tolerated       Significant Diagnostic Studies: Labs:   BMP:   Recent Labs   Lab 07/11/22  0801         K 3.7      CO2 23   BUN 18   CREATININE 0.8   CALCIUM 8.0*    and CMP   Recent Labs   Lab 07/11/22  0801      K 3.7      CO2 23      BUN 18   CREATININE 0.8   CALCIUM 8.0*   ANIONGAP 11   ESTGFRAFRICA >60   EGFRNONAA >60       Pending Diagnostic Studies:     None         Medications:  Transfer Medications (for Discharge Readmit only):   Current Facility-Administered Medications   Medication Dose Route Frequency Provider Last Rate Last Admin    acetaminophen tablet 650 mg  650 mg Oral Q4H  PRN Matt Duffy MD   650 mg at 07/08/22 1611    albuterol-ipratropium 2.5 mg-0.5 mg/3 mL nebulizer solution 3 mL  3 mL Nebulization Q6H PRN Matt Duffy MD        aluminum-magnesium hydroxide-simethicone 200-200-20 mg/5 mL suspension 30 mL  30 mL Oral QID PRN Matt Duffy MD        amLODIPine tablet 10 mg  10 mg Oral Daily LANG HodgsonP   10 mg at 07/12/22 0901    baclofen tablet 10 mg  10 mg Oral BID PRN Naya Calix MD   10 mg at 07/05/22 0944    cefTRIAXone (ROCEPHIN) 1 g/50 mL D5W IVPB  1 g Intravenous Q24H LANG HodgsonP   Stopped at 07/12/22 1026    cloNIDine tablet 0.1 mg  0.1 mg Oral Q6H PRN LANG HodgsonP   0.1 mg at 07/06/22 1947    cyanocobalamin tablet 1,000 mcg  1,000 mcg Oral Daily Naya Calix MD   1,000 mcg at 07/12/22 0901    dextrose 10% bolus 125 mL  12.5 g Intravenous PRN Naya Calix MD        dextrose 10% bolus 250 mL  25 g Intravenous PRN Naya Calix MD        dicyclomine tablet 20 mg  20 mg Oral TID PRN Naya Calix MD        gabapentin capsule 300 mg  300 mg Oral QHS Naya Calix MD        glucagon (human recombinant) injection 1 mg  1 mg Intramuscular PRN Naya Calix MD        glucose chewable tablet 16 g  16 g Oral PRN Naya Calix MD        glucose chewable tablet 24 g  24 g Oral PRN Naya Calix MD        heparin (porcine) injection 5,000 Units  5,000 Units Subcutaneous Q8H Naya Calix MD   5,000 Units at 07/12/22 1354    hydrALAZINE injection 10 mg  10 mg Intravenous Q6H PRN FELIZ Bland, ANP   10 mg at 07/09/22 0500    insulin aspart U-100 pen 0-5 Units  0-5 Units Subcutaneous QID (AC + HS) PRN Matt Duffy MD        meclizine tablet 25 mg  25 mg Oral TID PRN Naya Calix MD        melatonin tablet 6 mg  6 mg Oral Nightly PRN Matt Duffy MD   6 mg at 07/07/22 3749    methyl salicylate-menthol 15-10% cream   Topical (Top) TID PRN Vita Hankins, LANGP    Given at 07/10/22 2139    metoclopramide HCl injection 10 mg  10 mg Intravenous Q6H PRN Matt Duffy MD        metoprolol succinate (TOPROL-XL) 24 hr tablet 25 mg  25 mg Oral BID BERENICE Hodgson   25 mg at 07/12/22 0901    miconazole NITRATE 2 % top powder   Topical (Top) BID BERENICE Hodgson   Given at 07/12/22 0840    naloxone 0.4 mg/mL injection 0.02 mg  0.02 mg Intravenous PRN Matt Duffy MD        oxyCODONE immediate release tablet 10 mg  10 mg Oral Q6H PRN Naya Calix MD   10 mg at 07/12/22 1501    oxyCODONE immediate release tablet 5 mg  5 mg Oral Q6H PRN Naya Calix MD   5 mg at 07/08/22 0556    pantoprazole EC tablet 40 mg  40 mg Oral Daily Naya Calix MD   40 mg at 07/12/22 0901    senna-docusate 8.6-50 mg per tablet 1 tablet  1 tablet Oral BID Matt Duffy MD   1 tablet at 07/12/22 0220    simethicone chewable tablet 80 mg  1 tablet Oral QID PRN Matt Duffy MD   80 mg at 07/10/22 1005    sodium chloride 0.9% flush 10 mL  10 mL Intravenous PRN Naya Calix MD        sodium chloride 0.9% flush 10 mL  10 mL Intravenous Q12H PRN Matt Duffy MD        sodium chloride 0.9% flush 10 mL  10 mL Intravenous Q6H Naya Calix MD   10 mL at 07/12/22 0537    And    sodium chloride 0.9% flush 10 mL  10 mL Intravenous PRN Naya Calix MD   10 mL at 07/11/22 1742     Current Outpatient Medications   Medication Sig Dispense Refill    ascorbic acid, vitamin C, (VITAMIN C) 1000 MG tablet Take 1 tablet (1,000 mg total) by mouth 2 (two) times daily. 60 tablet 5    atorvastatin (LIPITOR) 40 MG tablet Take 1 tablet (40 mg total) by mouth every evening. 90 tablet 3    baclofen (LIORESAL) 10 MG tablet Take 1 tablet (10 mg total) by mouth 2 (two) times daily as needed (muscle cramps). 90 tablet 1    cyanocobalamin (VITAMIN B-12) 1000 MCG tablet Take 1 tablet (1,000 mcg total) by mouth once daily. 30 tablet 5    dicyclomine (BENTYL) 20 mg tablet  TAKE 1 TABLET(20 MG) BY MOUTH THREE TIMES DAILY AS NEEDED FOR ABDOMINAL PAIN 60 tablet 1    gabapentin (NEURONTIN) 300 MG capsule Take 1 capsule (300 mg total) by mouth every evening. 30 capsule 1    LIDOcaine (LIDODERM) 5 % Place 1 patch onto the skin once daily. Remove & Discard patch within 12 hours or as directed by MD 15 patch 0    magnesium oxide (MAG-OX) 400 mg (241.3 mg magnesium) tablet Take 1 tablet (400 mg total) by mouth 2 (two) times daily. 60 tablet 1    meclizine (ANTIVERT) 25 mg tablet Take 1 tablet (25 mg total) by mouth 3 (three) times daily as needed for Dizziness. 60 tablet 3    methenamine (HIPREX) 1 gram Tab Take 1 tablet (1 g total) by mouth 2 (two) times daily. 60 tablet 6    oxyCODONE (ROXICODONE) 10 mg Tab immediate release tablet Take 1 tablet (10 mg total) by mouth every 8 (eight) hours as needed for Pain. 60 tablet 0    pantoprazole (PROTONIX) 40 MG tablet Take 1 tablet (40 mg total) by mouth once daily. 90 tablet 3    amLODIPine (NORVASC) 10 MG tablet Take 1 tablet (10 mg total) by mouth once daily. 30 tablet 11    cefTRIAXone (ROCEPHIN) 1 g/50 mL PgBk IVPB Inject 50 mLs (1 g total) into the vein once daily. End date 7/16/22 for 5 days 250 mL 0    metoprolol succinate (TOPROL-XL) 25 MG 24 hr tablet Take 1 tablet (25 mg total) by mouth 2 (two) times daily. 180 tablet 3    miconazole NITRATE 2 % (MICOTIN) 2 % top powder Apply topically 2 (two) times daily. for 10 days  0    senna-docusate 8.6-50 mg (PERICOLACE) 8.6-50 mg per tablet Take 1 tablet by mouth 2 (two) times daily.         Indwelling Lines/Drains at time of discharge:   Lines/Drains/Airways     Drain  Duration           Female External Urinary Catheter 07/03/22 0100 9 days                Time spent on the discharge of patient: 30 minutes         The attending portion of this evaluation, treatment, and documentation was performed per Karen Garcia MD via Telemedicine AudioVisual using the secure V[employer] software  platform with 2 way audio/video. The provider was located off-site and the patient is located in the hospital. The aforementioned video software was utilized to document the relevant history and physical exam    Karen Garcia MD  Department of [facility] Medicine  [facility]

## 2022-07-12 NOTE — PLAN OF CARE
Sw faxed facility transfer orders via careport to [facility].     Sw will continue to follow pt for d/c planning.     Future Appointments   Date Time Provider Department Center   7/15/2022  8:00 AM Amy Bright NP [address]   9/1/2022  9:20 AM Ervin Parikh MD [facility] UROLOGY [facility]   9/21/2022 10:40 AM DO TARAH Savage [facility] [place]         07/12/22 0943   Post-Acute Status   Post-Acute Authorization Placement   Discharge Plan   Discharge Plan A Skilled Nursing Facility

## 2022-07-12 NOTE — PT/OT/SLP PROGRESS
"Occupational Therapy      Patient Name:  Patito Domingo   MRN:  [id number]    Patient not seen today secondary to Patient unwilling to participate (PM 1200: Pt declined therapy stating she already did her PT and "had a good workout".  She reports she will be discharging today and she will do her therpay there.). Will follow-up at later time.    7/12/2022  "

## 2022-07-13 ENCOUNTER — EXTERNAL HOME HEALTH (OUTPATIENT)
Dept: HOME HEALTH SERVICES | Facility: HOSPITAL | Age: 72
End: 2022-07-13
Payer: MEDICARE

## 2022-07-13 LAB
BACTERIA BLD CULT: NORMAL
BACTERIA BLD CULT: NORMAL

## 2022-07-13 PROCEDURE — 25000003 PHARM REV CODE 250: Performed by: HOSPITALIST

## 2022-07-13 PROCEDURE — 25000003 PHARM REV CODE 250: Performed by: NURSE PRACTITIONER

## 2022-07-13 PROCEDURE — 97535 SELF CARE MNGMENT TRAINING: CPT

## 2022-07-13 PROCEDURE — 97162 PT EVAL MOD COMPLEX 30 MIN: CPT

## 2022-07-13 PROCEDURE — 63600175 PHARM REV CODE 636 W HCPCS: Performed by: HOSPITALIST

## 2022-07-13 PROCEDURE — 97165 OT EVAL LOW COMPLEX 30 MIN: CPT

## 2022-07-13 PROCEDURE — 11000004 HC SNF PRIVATE

## 2022-07-13 RX ORDER — OXYCODONE HYDROCHLORIDE 10 MG/1
10 TABLET ORAL EVERY 6 HOURS PRN
Status: DISCONTINUED | OUTPATIENT
Start: 2022-07-13 | End: 2022-07-18

## 2022-07-13 RX ADMIN — CEFTRIAXONE 1 G: 1 INJECTION, SOLUTION INTRAVENOUS at 09:07

## 2022-07-13 RX ADMIN — BACLOFEN 10 MG: 10 TABLET ORAL at 09:07

## 2022-07-13 RX ADMIN — MICONAZOLE NITRATE 2 % TOPICAL POWDER: at 09:07

## 2022-07-13 RX ADMIN — AMLODIPINE BESYLATE 10 MG: 10 TABLET ORAL at 09:07

## 2022-07-13 RX ADMIN — GABAPENTIN 300 MG: 300 CAPSULE ORAL at 09:07

## 2022-07-13 RX ADMIN — SENNOSIDES AND DOCUSATE SODIUM 1 TABLET: 50; 8.6 TABLET ORAL at 09:07

## 2022-07-13 RX ADMIN — OXYCODONE HYDROCHLORIDE 10 MG: 10 TABLET ORAL at 03:07

## 2022-07-13 RX ADMIN — METHENAMINE HIPPURATE 1 G: 1 TABLET ORAL at 09:07

## 2022-07-13 RX ADMIN — METOPROLOL SUCCINATE 25 MG: 25 TABLET, EXTENDED RELEASE ORAL at 09:07

## 2022-07-13 RX ADMIN — OXYCODONE HYDROCHLORIDE 10 MG: 10 TABLET ORAL at 09:07

## 2022-07-13 RX ADMIN — OXYCODONE HYDROCHLORIDE AND ACETAMINOPHEN 1000 MG: 500 TABLET ORAL at 09:07

## 2022-07-13 RX ADMIN — CYANOCOBALAMIN TAB 1000 MCG 1000 MCG: 1000 TAB at 09:07

## 2022-07-13 RX ADMIN — Medication 400 MG: at 09:07

## 2022-07-13 RX ADMIN — ATORVASTATIN CALCIUM 40 MG: 40 TABLET, FILM COATED ORAL at 06:07

## 2022-07-13 RX ADMIN — OXYCODONE HYDROCHLORIDE 10 MG: 10 TABLET ORAL at 02:07

## 2022-07-13 RX ADMIN — PANTOPRAZOLE SODIUM 40 MG: 40 TABLET, DELAYED RELEASE ORAL at 09:07

## 2022-07-13 RX ADMIN — Medication 6 MG: at 09:07

## 2022-07-13 NOTE — PLAN OF CARE
Problem: Physical Therapy  Goal: Physical Therapy Goal  Description: Goals to be met in 30 days (2022):     Patient will increase functional independence with mobility by performin. Supine to sit with MInimal Assistance.  2. Sit to supine with MInimal Assistance.  3. Rolling to Left and Right with Minimal Assistance.  4. Sit to stand transfer with Contact Guard Assistance.  5. Bed to chair transfer with Minimal Assistance using Rolling Walker  6. Gait  x 50 feet with Minimal Assistance using Rolling Walker.   7. Wheelchair propulsion x 50 feet with Stand-by Assistance using bilateral upper extremities.  8. Ascend/Descend 4 inch curb step with Moderate Assistance using Rolling Walker.  9. Lower extremity exercise program x20 reps per handout, with supervision.    Outcome: Ongoing, Progressing

## 2022-07-13 NOTE — PT/OT/SLP EVAL
Physical Therapy Evaluation    Patient Name:  Patito Domingo   MRN:  [id number]  Admit Date: 7/12/2022  Admitting Diagnosis: Sepsis  Recent Surgeries: N/A    General Precautions: Standard, fall   Orthopedic Precautions:N/A   Braces: N/A     Recommendations:     Discharge Recommendations:  home health PT   Level of Assistance Recommended: 24 hours significant assistance  Discharge Equipment Recommendations: bedside commode, bath bench, grab bar, rollator, shower chair, walker, rolling, wheelchair   Barriers to discharge: Decreased caregiver support    Assessment:     Patito Domingo is a 71 y.o. female admitted with a medical diagnosis of sepsis. Performance deficits affecting function  weakness, impaired endurance, impaired self care skills, impaired functional mobilty, gait instability, impaired balance, decreased lower extremity function, pain, impaired skin, impaired cardiopulmonary response to activity. Patient requires MaxA with sitting EOB to supine, ModA with rolling, supine to sit, and bed<>chair, and  Poli with STS. Initial evaluation was limited due to pain in right hip. Gait assessment was limited today due to R hip pain and patient reported being tired from sitting up today. She requires skilled physical therapy services to address deficits and return patient to PLOF with no limitations.    Rehab Potential is fair     Activity Tolerance: Good    Plan:     Patient to be seen 6 x/week to address the above listed problems via gait training, therapeutic activities, therapeutic exercises, neuromuscular re-education, wheelchair management/training     Plan of Care Expires: 08/12/22  Plan of Care Reviewed with: patient    Subjective     Chief Complaint: I feel weak  Patient/Family Comments/goals: I want to walk  Pain/Comfort:  Pain Rating 1:  (not rated)  Location - Side 1: Right  Location - Orientation 1: generalized  Location 1: hip  Pain Addressed 1: Pre-medicate for activity, Reposition, Distraction,  Cessation of Activity, Nurse notified  Pain Rating Post-Intervention 1:  (not rated)    Living Environment:   Pt lives alone in a 1st floor apt with 0 KESHAWN. She has a tub/shower combo with a transfer tub bench.    Prior to admission, patients level of function was Uday, has assistance 1x per week for bathing, pt reports also helps to clean home. Equipment used at home: bedside commode, bath bench, rollator, shower chair, walker, rolling, wheelchair.  DME owned (not currently used): none.  Upon discharge, patient will have limited assistance.    Patients cultural, spiritual, Hinduism conflicts given the current situation: no    Objective:     Communicated with nursing staff prior to session. Patient found supine with PICC line  upon PT entry to room.    Exams:  · Cognitive Exam: Patient is oriented to Person, Place, Time and Situation  · Gross Motor Coordination: Patient performed heel to shin test indicating intact coordination.  · Postural Exam: Patient presented with the following abnormalities:     -       Rounded shoulders   -       Forward head  · Sensation:     -       Intact  · Skin Integrity/Edema:      -       Skin integrity: Visible skin intact and Dry  · AROM/Strength:              -      RLE ROM: WNL              -      RLE Strength: HF 3-/5 > KE 3+/5 > KF 4/5 > DF 4/5              -      LLE ROM: WNL              -      LLE Strength: HF 3-/5 > KE 3+/5 > KF 4/5 > DF 4/5    Functional Mobility:   07/13/22 1359   Prior Functioning: Everyday Activities   Self Care Needed some help  (1 x per week for bathing only)   Indoor Mobility (Ambulation) Independent   Stairs Not applicable   Functional Cognition Independent   Prior Device Use Walker   Roll Left and Right   Assistance Needed Physical assistance   Physical Assistance Level 26%-50%   Comment ModA with [facility] elevated   CARE Score - Roll Left and Right 3   Roll Left and Right Discharge Goal   Discharge Goal 6   Sit to Lying   Assistance Needed Physical  assistance   Physical Assistance Level 76% or more   Comment MaxA with HOB elevated   CARE Score - Sit to Lying 2   Lying to Sitting on Side of Bed   Assistance Needed Physical assistance   Physical Assistance Level 26%-50%   Comment ModA with HOB elevated   CARE Score - Lying to Sitting on Side of Bed 3   Sit to Stand   Assistance Needed Physical assistance   Physical Assistance Level 25% or less   Comment Poli due to elevated bed height   CARE Score - Sit to Stand 3   Chair/Bed-to-Chair Transfer   Assistance Needed Physical assistance   Physical Assistance Level 26%-50%   Comment Per OT eval   CARE Score - Chair/Bed-to-Chair Transfer 3   Car Transfer   Reason if not Attempted Environmental limitations   CARE Score - Car Transfer 10   Walk 10 Feet   Reason if not Attempted Safety concerns   CARE Score - Walk 10 Feet 88   Walk 50 Feet with Two Turns   Reason if not Attempted Safety concerns   CARE Score - Walk 50 Feet with Two Turns 88   Walk 150 Feet   Reason if not Attempted Safety concerns   CARE Score - Walk 150 Feet 88   Walking 10 Feet on Uneven Surfaces   Reason if not Attempted Safety concerns   CARE Score - Walking 10 Feet on Uneven Surfaces 88   1 Step (Curb)   Reason if not Attempted Safety concerns   CARE Score - 1 Step (Curb) 88   4 Steps   Reason if not Attempted Safety concerns   CARE Score - 4 Steps 88   12 Steps   Reason if not Attempted Safety concerns   CARE Score - 12 Steps 88   Picking Up Object   Reason if not Attempted Safety concerns   CARE Score - Picking Up Object 88   Uses a Wheelchair/Scooter?   Uses a Wheelchair/Scooter? Yes   Wheel 50 Feet with Two Turns   Reason if not Attempted Safety concerns   CARE Score - Wheel 50 Feet with Two Turns 88   Type of Wheelchair/Scooter Manual   Wheel 150 Feet   Reason if not Attempted Safety concerns   CARE Score - Wheel 150 Feet 88   Type of Wheelchair/Scooter Manual      Functional Mobility:  Bed Mobility:     · Rolling Left: moderate  assistance  · Rolling Right: moderate assistance  · Scooting: stand by assistance  · Bridging: stand by assistance  · Supine to Sit: moderate assistance  · Sit to Supine: maximal assistance    Transfers:     · Sit to Stand x 2 trials: minimum assistance with rolling walker     Education:   Patient provided with daily orientation and goals of this PT session.   Patient educated to transfer to bedside chair/bedside commode/bathroom with RN/PCT present.    Patient educated on bed mobility training, energy conservation, Fall risk, home safety, plan of care, pursed lip breathing and transfer training by explanation.    Patient Verbalized Understanding.   Time provided for therapeutic counseling and discussion of current health disposition. All questions answered to satisfaction, within scope of PT practice; voiced no other concerns. White board updated in patient's room, RN notified of session.    AM-PAC 6 CLICK MOBILITY  Total Score:11     Patient left supine with call button in reach.    GOALS:   Multidisciplinary Problems     Physical Therapy Goals        Problem: Physical Therapy    Goal Priority Disciplines Outcome Goal Variances Interventions   Physical Therapy Goal     PT, PT/OT Ongoing, Progressing     Description: Goals to be met in 30 days (2022):     Patient will increase functional independence with mobility by performin. Supine to sit with MInimal Assistance.  2. Sit to supine with MInimal Assistance.  3. Rolling to Left and Right with Minimal Assistance.  4. Sit to stand transfer with Contact Guard Assistance.  5. Bed to chair transfer with Minimal Assistance using Rolling Walker  6. Gait  x 50 feet with Minimal Assistance using Rolling Walker.   7. Wheelchair propulsion x 50 feet with Stand-by Assistance using bilateral upper extremities.  8. Ascend/Descend 4 inch curb step with Moderate Assistance using Rolling Walker.  9. Lower extremity exercise program x20 reps per handout, with  supervision.                     History:     Past Medical History:   Diagnosis Date    Allergy     Arthritis     Cataract     Chronic diastolic (congestive) heart failure     Colon cancer     Encounter for blood transfusion     History of ESBL E. 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)        Past Surgical History:   Procedure Laterality Date    ABDOMINAL SURGERY      CATARACT EXTRACTION Left 10/2017     SECTION      CHOLECYSTECTOMY      COLON SURGERY      cystoscope      CYSTOSCOPY W/ RETROGRADES Right 10/10/2019    Procedure: CYSTOSCOPY, WITH RETROGRADE PYELOGRAM;  Surgeon: Gen Isbell MD;  Location: [facility];  Service: Urology;  Laterality: Right;    ERCP N/A 2021    Procedure: ERCP (ENDOSCOPIC RETROGRADE CHOLANGIOPANCREATOGRAPHY);  Surgeon: Jayjay Rivera MD;  [facility]  Service: Endoscopy;  Laterality: N/A;    ERCP N/A 3/4/2022    Procedure: ERCP (ENDOSCOPIC RETROGRADE CHOLANGIOPANCREATOGRAPHY);  Surgeon: Roby Virgen MD;  [facility]  Service: Endoscopy;  Laterality: N/A;    EYE SURGERY      HYSTERECTOMY  1996    LITHOTRIPSY      LIVER BIOPSY      carcinoid    URETEROSCOPY Right 10/10/2019    Procedure: URETEROSCOPY;  Surgeon: Gen Isbell MD;  Location: [facility] OR;  Service: Urology;  Laterality: Right;    UTERINE FIBROID SURGERY         Time Tracking:     PT Received On: 22  PT Start Time: 1359     PT Stop Time: 1420  PT Total Time (min): 21 min     Billable Minutes: Evaluation 2022

## 2022-07-13 NOTE — PLAN OF CARE
Problem: Adult Inpatient Plan of Care  Goal: Plan of Care Review  Outcome: Ongoing, Progressing  Goal: Patient-Specific Goal (Individualized)  Outcome: Ongoing, Progressing  Goal: Absence of Hospital-Acquired Illness or Injury  Outcome: Ongoing, Progressing  Goal: Optimal Comfort and Wellbeing  Outcome: Ongoing, Progressing  Goal: Readiness for Transition of Care  Outcome: Ongoing, Progressing     Problem: Adjustment to Illness (Sepsis/Septic Shock)  Goal: Optimal Coping  Outcome: Ongoing, Progressing     Problem: Bleeding (Sepsis/Septic Shock)  Goal: Absence of Bleeding  Outcome: Ongoing, Progressing     Problem: Glycemic Control Impaired (Sepsis/Septic Shock)  Goal: Blood Glucose Level Within Desired Range  Outcome: Ongoing, Progressing     Problem: Infection Progression (Sepsis/Septic Shock)  Goal: Absence of Infection Signs and Symptoms  Outcome: Ongoing, Progressing     Problem: Nutrition Impaired (Sepsis/Septic Shock)  Goal: Optimal Nutrition Intake  Outcome: Ongoing, Progressing     Problem: Fluid and Electrolyte Imbalance (Acute Kidney Injury/Impairment)  Goal: Fluid and Electrolyte Balance  Outcome: Ongoing, Progressing     Problem: Oral Intake Inadequate (Acute Kidney Injury/Impairment)  Goal: Optimal Nutrition Intake  Outcome: Ongoing, Progressing     Problem: Renal Function Impairment (Acute Kidney Injury/Impairment)  Goal: Effective Renal Function  Outcome: Ongoing, Progressing     Problem: Infection  Goal: Absence of Infection Signs and Symptoms  Outcome: Ongoing, Progressing     Problem: Impaired Wound Healing  Goal: Optimal Wound Healing  Outcome: Ongoing, Progressing     Problem: Skin Injury Risk Increased  Goal: Skin Health and Integrity  Outcome: Ongoing, Progressing     Problem: Oral Intake Inadequate  Goal: Improved Oral Intake  Outcome: Ongoing, Progressing

## 2022-07-13 NOTE — PLAN OF CARE
Problem: Occupational Therapy  Goal: Occupational Therapy Goal  Description: Goals to be met by: 30 days     Patient will increase functional independence with ADLs by performing:    Feeding with Modified [place].  UE Dressing with Set-up Assistance.  LE Dressing with Minimal Assistance.  Grooming while seated at sink with Modified [place].  Toileting from toilet or BSC with Minimal Assistance for hygiene and clothing management.   Bathing from  sitting at sink with Minimal Assistance.  Supine to sit with Stand-by Assistance.  Step transfer with Contact Guard Assistance with RW.  Upper extremity exercise with supervision.  Caregiver will be educated on level of assist required to safely perform self care tasks and functional transfers..     Outcome: Ongoing, Progressing

## 2022-07-13 NOTE — PT/OT/SLP EVAL
Occupational Therapy   Evaluation / Treatment    Name: Patito Domingo  MRN: [id number]  Admit Date: 7/12/2022  Admitting Diagnosis:  Sepsis   Recent Surgeries:   N/A    General Precautions: Standard, fall   Orthopedic Precautions:N/A   Braces: N/A     Recommendations:     Discharge Recommendations: home health OT  Level of Assistance Recommended: 24 hours light assistance  Discharge Equipment Recommendations:   (TBD)  Barriers to discharge:  Decreased caregiver support    Assessment:     Patito Domingo is a 71 y.o. female with a medical diagnosis of Sepsis .  She remains limited in performance of self-care , functional mobility and ADLs and currently not performing tasks at OF . Currently presenting with performance deficits including weakness, impaired endurance, impaired self care skills, impaired functional mobilty, gait instability, impaired balance, decreased upper extremity function, decreased lower extremity function, decreased safety awareness, pain, decreased ROM, impaired cardiopulmonary response to activity, decreased coordination.    Pt tolerated Tx without incident but requires assist to perform self care tasks, functional mobility and functional transfers .  She would benefit from OT intervention to further her functional (I)ce and safety.    Rehab Potential is good    Activity Tolerance: Fair    Plan:     Patient to be seen 6 x/week to address the above listed problems via self-care/home management, therapeutic activities, therapeutic exercises  · Plan of Care Expires: 08/13/22  · Plan of Care Reviewed with: patient    Subjective     Chief Complaint: Pt reporting  (R) hip pain   Patient/Family Comments/goals: Pt wants to return to [facility]    Occupational Profile:  Living Environment: Pt lives alone, 1st floor apt, 0STE, tub/shower combo with TTB  Previous level of function: Pt reports Uday, has assistance 1x per week for bathing, pt reports also helps to clean home  Roles and Routines: Does not  drive, uses transportation services  Equipment Used at Home:  walker, rolling, wheelchair, rollator, bedside commode, bath bench  Assistance upon Discharge: Limited    Pain/Comfort:  · Pain Rating 1: 9/10  · Location - Side 1: Right  · Location - Orientation 1: generalized  · Location 1: hip  · Pain Addressed 1: Pre-medicate for activity, Reposition, Distraction, Cessation of Activity  · Pain Rating Post-Intervention 1: 9/10    Patients cultural, spiritual, Buddhist conflicts given the current situation: no    Objective:     Communicated with: nurse prior to session.  Patient found  with PICC line upon OT entry to room.    Occupational Performance:     Eating   Assistance Needed Set-up / clean-up   Physical Assistance Level No physical assistance   CARE Score - Eating 5   Oral Hygiene   Assistance Needed Set-up / clean-up   Physical Assistance Level No physical assistance  (with all grooming performed seated sinkside.)   CARE Score - Oral Hygiene 5   Toileting Hygiene   Assistance Needed Physical assistance   Physical Assistance Level 76% or more  (with Pt toileting from raised toilet with GB or RW to stand.)   CARE Score - Toileting Hygiene 2   Shower/Bathe Self   Assistance Needed Physical assistance   Physical Assistance Level 51%-75%  (sponge bathing seated sinkside.)   CARE Score - Shower/Bathe Self 2   Upper Body Dressing   Assistance Needed Physical assistance   Physical Assistance Level 26%-50%  (donning/doffing pullover shirt.)   CARE Score - Upper Body Dressing 3   Lower Body Dressing   Assistance Needed Physical assistance   Physical Assistance Level 76% or more  (when donning pants with RW to stand.)   CARE Score - Lower Body Dressing 2   Putting On/Taking Off Footwear   Assistance Needed Physical assistance   Physical Assistance Level 76% or more  (doffing/donning socks)   CARE Score - Putting On/Taking Off Footwear 2   Roll Left and Right   Assistance Needed Incidental touching   Physical Assistance  Level 25% or less  (with HOB flat)   CARE Score - Roll Left and Right 3   Sit to Lying   Assistance Needed Physical assistance   Physical Assistance Level 26%-50%  (with HOB flat)   CARE Score - Sit to Lying 3   Lying to Sitting on Side of Bed   Assistance Needed Physical assistance   Physical Assistance Level 26%-50%  (with HOB flat)   CARE Score - Lying to Sitting on Side of Bed 3   Sit to Stand   Assistance Needed Physical assistance   Physical Assistance Level 26%-50%  (when transitioning from sitting to standing with RW)   CARE Score - Sit to Stand 3   Chair/Bed-to-Chair Transfer   Assistance Needed Physical assistance   Physical Assistance Level 26%-50%  (when transitioning from sitting to standing with RW)   CARE Score - Chair/Bed-to-Chair Transfer 3   Toilet Transfer   Assistance Needed Physical assistance   Physical Assistance Level 26%-50%  (when transitioning from sitting to standing with RW)   CARE Score - Toilet Transfer 3       Cognitive/Visual Perceptual:  Cognitive/Psychosocial Skills:     -       Oriented to: Person, Place and Situation   -       Follows Commands/attention:Follows two-step commands  -       Communication: clear/fluent  -       Memory: No Deficits noted  -       Safety awareness/insight to disability: impaired   -       Mood/Affect/Coping skills/emotional control: Appropriate to situation  Visual/Perceptual:      -Intact .    Physical Exam:  Balance: -  Pt demonstrated Fair functional sitting balance as noted when performing self care tasks. Poor + functional standing with RW and Min (A) required for safety.     Postural examination/scapula alignment:    -       Rounded shoulders  Skin integrity: Visible skin intact  Edema:  None noted  Sensation:    -       Intact  Motor Planning: -       WFLs  Dominant hand: -       Right  Upper Extremity Range of Motion:     -       Right Upper Extremity: WFL  -       Left Upper Extremity: WFL  Upper Extremity Strength:    -       Right Upper  Extremity: Grossly 4-/5 throughout  -       Left Upper Extremity: Grossly 4-/5 throughout   Strength:    -       Right Upper Extremity: WFL  -       Left Upper Extremity: WFL  Fine Motor Coordination:    -    Grossly Intact    AMPAC 6 Click ADL:  AMPAC Total Score: 15    Treatment & Education:  Pt edu on role of OT, POC, safety when performing self care tasks , benefit of performing OOB activity, and safety when performing functional transfers and mobility.  - White board updated  - Self care tasks completed-- as noted above     Education:    Patient left up in chair with call button in reach and nurse notified    GOALS:   Multidisciplinary Problems     Occupational Therapy Goals        Problem: Occupational Therapy    Goal Priority Disciplines Outcome Interventions   Occupational Therapy Goal     OT, PT/OT Ongoing, Progressing    Description: Goals to be met by: 30 days     Patient will increase functional independence with ADLs by performing:    Feeding with Modified [place].  UE Dressing with Set-up Assistance.  LE Dressing with Minimal Assistance.  Grooming while seated at sink with Modified [place].  Toileting from toilet or BSC with Minimal Assistance for hygiene and clothing management.   Bathing from  sitting at sink with Minimal Assistance.  Supine to sit with Stand-by Assistance.  Step transfer with Contact Guard Assistance with RW.  Upper extremity exercise with supervision.  Caregiver will be educated on level of assist required to safely perform self care tasks and functional transfers..                      History:     Past Medical History:   Diagnosis Date    Allergy     Arthritis     Cataract     Chronic diastolic (congestive) heart failure     Colon cancer     Encounter for blood transfusion     History of ESBL E. 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)        Past Surgical History:   Procedure Laterality Date    ABDOMINAL SURGERY      CATARACT EXTRACTION Left 10/2017     SECTION      CHOLECYSTECTOMY      COLON SURGERY      cystoscope      CYSTOSCOPY W/ RETROGRADES Right 10/10/2019    Procedure: CYSTOSCOPY, WITH RETROGRADE PYELOGRAM;  Surgeon: Gen Isbell MD;  Location: [facility];  Service: Urology;  Laterality: Right;    ERCP N/A 2021    Procedure: ERCP (ENDOSCOPIC RETROGRADE CHOLANGIOPANCREATOGRAPHY);  Surgeon: Jayjay Rivera MD;  Location: [facility] ENDO ([address] [place] [address]);  Service: Endoscopy;  Laterality: N/A;    ERCP N/A 3/4/2022    Procedure: ERCP (ENDOSCOPIC RETROGRADE CHOLANGIOPANCREATOGRAPHY);  Surgeon: Roby Virgen MD;  Location: [facility] ([address] [place] [address]);  Service: Endoscopy;  Laterality: N/A;    EYE SURGERY      HYSTERECTOMY  1996    LITHOTRIPSY      LIVER BIOPSY      carcinoid    URETEROSCOPY Right 10/10/2019    Procedure: URETEROSCOPY;  Surgeon: Gen Isbell MD;  Location: [facility];  Service: Urology;  Laterality: Right;    UTERINE FIBROID SURGERY         Time Tracking:     OT Date of Treatment: 22  OT Start Time: 830  OT Stop Time: 930  OT Total Time (min): 60 min    Billable Minutes:Evaluation 20  Self Care/Home Management 40    2022

## 2022-07-13 NOTE — PROGRESS NOTES
"                                                        [facility]                                  Skilled Nursing Facility                   Progress Note     Admit Date: 7/12/2022  JULIO C TBD  Principal Problem:  Bacteremia   HPI obtained from patient interview and chart review     Chief Complaint: Establish Care/ Initial Visit     HPI:   Mrs. 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  Admission to SNF for secondary weakness and debility.     Patient originally presented to [facility] ED on 07/02 with worsening generalized weakness and lethargy x 2 days. Per [facility], "She has also noticed abd pain, n/v, increased urinary frequency/urgency and fell at home last night but denies LOC. Was prescribed keflex yesterday for UTI. Pt noted to be very somnolent and minimally conversant upon arrival to ED. Workup significant for +UA, leukocytosis, lactic acidosis, FLORECITA, elevated troponin. CT abd/pelvis with no acute findings; unchanged size of carcinoid tumor with liver mets and lymphadenopathy. Started on meropenem for UTI due to recent urine culture growing ESBL klebsiella. Mental status has since improved and patient now alert and oriented x4. She was admitted with a diagnosis of sepsis 2/2 UTI, FLORECITA and non-traumatic rhabdomyolysis.  Urine and blood cultures were obtained and she was placed on IV meropenem.  Her admitting troponin was elevated and cardiology was consulted.  An echocardiogram was obtained revealing normal ejection fraction with normal wall motion.  Her troponin trended down.  She was given gentle IVF and her FLORECITA as well as rhabdomyolysis improved.  Her blood culture was positive for GNR, and repeat cultures were drawn.  Urine culture was positive for ESCHERICHIA COLI, sensitive to Rocephin and abx were de-escalated from meropenem to IV " "rocephin.  PT/OT were consulted.  Pt c/o right hip pain so an xray was obtained which was negative for fracture.  She c/o swelling to RLE.  An ultrasound of the BLE was performed which showed no evidence of DVT.  She developed intermittent low grade fevers despite abx therapy and ID was consulted.  Per ID, presence of large bilateral renal stones complicates picture, although non-obstructive.  Previously assessed by urology, but decided against stone retrieval due to risks of percutaneous nephrolithotomy, and stones not clearly causing UTIs.  ID recommendations are for IV rocephin 1gm q24h x 2 weeks, start date 22.  Plan DC to [facility] with PICC or midline for continued abx treatment.  Pt accepted to [facility] and discharge there for rehab and completion of IV antibiotics."    Today, patient reports RLE pain.  Pain similar to the pain she was having at [facility].  States "muscle rub cream" helps, similar to BenGay.  Also taking PO pain medication.    Patient will be treated at [facility] with PT and OT to improve functional status and ability to perform ADLs.     Past Medical History: Patient has a past medical history of Allergy, Arthritis, Cataract, Chronic diastolic (congestive) heart failure, Colon cancer, Encounter for blood transfusion, History of ESBL E. 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    Past Surgical History: Patient has a past surgical history that includes Liver biopsy (); Lithotripsy; cystoscope; Hysterectomy (1996); Colon surgery; Eye surgery; Cataract extraction (Left, 10/2017); Cholecystectomy; Cystoscopy w/ retrogrades (Right, 10/10/2019); Ureteroscopy (Right, 10/10/2019);  section; Uterine fibroid surgery; Abdominal surgery; ERCP (N/A, 2021); and ERCP " (N/A, 3/4/2022).    Social History: Patient reports that she has never smoked. She has never used smokeless tobacco. She reports that she does not drink alcohol and does not use drugs.    Family History: family history includes Alzheimer's disease in her father; Cancer in her mother; No Known Problems in her son, son, son, and son; Stroke in her sister.    Allergies: Patient is allergic to contrast media, epinephrine, ibuprofen, zofran [ondansetron hcl], iodinated contrast media, morphine, and sulfa (sulfonamide antibiotics).    ROS  Constitutional: Negative for fever   Eyes: Negative for blurred vision, double vision   Respiratory: Negative for cough, shortness of breath   Cardiovascular: Negative for chest pain, palpitations, and leg swelling.   Gastrointestinal: Negative for abdominal pain, constipation, diarrhea, nausea, vomiting.   Genitourinary: Negative for dysuria, frequency   Musculoskeletal:  + generalized weakness.  +RLE pain  Skin: Negative for itching and rash.   Neurological: Negative for dizziness, headaches.   Psychiatric/Behavioral: Negative for depression. The patient is not nervous/anxious.      24 hour Vital Sign Range   Temp:  [98.5 °F (36.9 °C)-99 °F (37.2 °C)]   Pulse:  [78-98]   Resp:  [16-18]   BP: (114-170)/(56-72)   SpO2:  [95 %-98 %]     PEx  Constitutional: Patient appears debilitated.  No distress noted  HENT:   Head: Normocephalic and atraumatic.   Eyes: Pupils are equal, round  Neck: Normal range of motion. Neck supple.   Cardiovascular: Normal rate, regular rhythm and normal heart sounds.    Pulmonary/Chest: Effort normal and breath sounds are clear  Abdominal: Soft. Bowel sounds are normal.   Musculoskeletal: Normal range of motion.   Neurological: Alert and oriented to person, place, and time.   Psychiatric: Normal mood and affect. Behavior is normal.   Skin: Skin is warm and dry. Full skin assessment completed.    Altered Skin Integrity 07/07/22 1104 Right Buttocks Shearing  Partial thickness tissue loss. Shallow open ulcer with a red or pink wound bed, without slough. Intact or Open/Ruptured Serum-filled blister.   Date First Assessed/Time First Assessed: 07/07/22 1104   Altered Skin Integrity Present on Admission: suspected hospital acquired  Side: Right  Location: Buttocks  Is this injury device related?: No  Primary Wound Type: Shearing  Description of Altere...   Dressing Appearance Intact;Moist drainage   Drainage Amount Scant   Drainage Characteristics/Odor Serosanguineous   Appearance Red;Moist   Tissue loss description Partial thickness   Periwound Area Intact;Other (see comments)  (resolved blistered area)   Wound Edges Open   Wound Length (cm) 6 cm   Wound Width (cm) 3 cm   Wound Depth (cm) 0.2 cm   Wound Volume (cm^3) 3.6 cm^3   Wound Surface Area (cm^2) 18 cm^2   Care Cleansed with:;Sterile normal saline;Applied:;Skin Barrier         No results for input(s): GLUCOSE, NA, K, CL, CO2, BUN, CREATININE, MG in the last 24 hours.    Invalid input(s):  CALCIUM    No results for input(s): WBC, RBC, HGB, HCT, PLT, MCV, MCH, MCHC in the last 24 hours.      Assessment and Plan:    Bacteremia  UTI   - presence of large bilateral renal stones complicates picture, although non-obstructive  - bilateral DVT US is negative  - repeat blood cx -ngtd  - continue CTX 1g IV q 24H - will need 2 weeks from negative cx - start date is 7/2- end date is 7/18  - possible that fever is related to carcinoid     RLE pain  - previous ultrasound negative for DVT  - increased oxycodone 10 mg to q.6 hours PRN, ordered for BenGay topical cream TID     NSTEMI (non-ST elevated myocardial infarction)  - Trop trending down. No EKG changes or c/o chest pain  - TTE reviewed.  - follow-up with Cardiology as outpatient     Chronic diastolic (congestive) heart failure  - No signs of decompensation  - Cont metoprolol  - TTE showing grade I diastolic dysfunction  - Monitor I&O      HLD (hyperlipidemia)  - Continue  atorvastatin      Anemia of chronic disease  - continue monitor twice weekly CBCs, transfuse for hemoglobin < 7    Essential hypertension  - continue amlodipine 10 mg daily, metoprolol XL 25 mg daily.  Home losartan on hold due to previous FLORECITA    Metastatic carcinoid tumor  - ER physician discussed the case with Dr. Perez with neuroendocrine services  - Continue follow up outpatient as scheduled.  Not receiving any further treatment      Neuropathy pain  - continue gabapentin 30 mg qHS.    Palliative care encounter  Notes per [facility] [place]  - Pt elects to remain full code/full treatment status  - She does not have advance directives.  She is  and has 4 sons. If she becomes unable to make medical decisions for herself, she would want them to share surrogate decision making responsibility  - Her primary goal at this time is to regain strength to be able to go home, although she does mention that she is lonely at home.She understands that she is high-risk for readmission based on hospitalizations required over the last 6 months but feels that hospital encounters are not affecting her quality of life.  She is satisfied with home-based palliative care and would like for that to continue after SNF.   - Her goals include to regain strength and to extend life regardless of treatment burden. She confirms that she does want to continue cancer treatments.     Debility   - Continue with PT/OT for gait training and strengthening and restoration of ADL's   - Encourage mobility, OOB in chair, and early ambulation as appropriate  - Fall precautions   - Monitor for bowel and bladder dysfunction  - Monitor for and prevent skin breakdown and pressure ulcers  - initiated DVT prophylaxis with  Lovenox      Anticipate disposition:  Home with home health      Follow-up needed during SNF stay-    Appointment to send patient to- home visit on 07/15    Follow-up needed after discharge from SNF:   - PCP, hospital  follow-up  - neuroendocrine service  - Cardiology    Future Appointments   Date Time Provider Department Center   7/15/2022  8:00 AM Amy Bright NP [id number] [place]   9/1/2022  9:20 AM Ervin Parikh MD [facility] UROLOGY [facility]   9/21/2022 10:40 AM Mariela Wei DO [facility] [place]         I certify that SNF services are required to be given on an inpatient basis because Patito Domingo needs for skilled nursing care and/or skilled rehabilitation are required on a daily basis and such services can only practically be provided in a skilled nursing facility setting and are for an ongoing condition for which she received inpatient care in the hospital.     Total time of the visit 115 minutes [id number]  Non physical exam/ non charting time: 69 minutes   Description of non physical exam/non charting time: counseling patient on clinical conditions and therapies provided regarding pain control regimen, antihypertensive medication regimen, importance of proper pediatrician hydration, participation with therapy, beginning of discharge planning.  Extensive chart review completed including all consultation notes.  All pertinent laboratory and radiographical images reviewed.        Shirley Canas NP  Department of Hospital Medicine   [facility]     DOS: 7/13/2022       Patient note was created using MModal Dictation.  Any errors in syntax or even information may not have been identified and edited on initial review prior to signing this note.

## 2022-07-13 NOTE — CONSULTS
[facility] - Skilled Nursing  Adult Nutrition  Consult Note    SUMMARY     Recommendations    1. Liberalize diet to regular.  2. Add Boost Glucose Control TID.    Goals: 1. Pt's intake meals >50% by RD follow up.  Nutrition Goal Status: new  Communication of RD Recs: reviewed with physician    Assessment and Plan  Nutrition Problem  Inadequate oral intake    Related to (etiology):   Decreased appetite    Signs and Symptoms (as evidenced by):   Pt with abdominal pain, intake meals 0-25% over last week in hospital.     Interventions(treatment strategy):  Collaboration of care with providers.  General healthy diet.  Commercial beverage: Boost Glucose Control TID    Nutrition Diagnosis Status:   New       Malnutrition Assessment             Energy Intake (Malnutrition): less than or equal to 50% for greater than or equal to 5 days   Orbital Region (Subcutaneous Fat Loss): well nourished  Upper Arm Region (Subcutaneous Fat Loss): well nourished  Thoracic and Lumbar Region: well nourished   [place]ai Region (Muscle Loss): well nourished  Clavicle Bone Region (Muscle Loss): well nourished  Clavicle and Acromion Bone Region (Muscle Loss): well nourished  Scapular Bone Region (Muscle Loss): well nourished  Dorsal Hand (Muscle Loss): well nourished  Patellar Region (Muscle Loss): well nourished  Anterior Thigh Region (Muscle Loss): well nourished  Posterior Calf Region (Muscle Loss): well nourished   Edema (Fluid Accumulation): 0-->no edema present             Reason for Assessment    Reason For Assessment: consult  Diagnosis: infection/sepsis  Relevant Medical History: metastatic carcinoid tumor on chemotherapy (mets to liver), colon Ca, HTN, HLD, CVA, CHF  General Information Comments: Pt with poor intake over last week in hospital. Pt s/p FLORECITA, was on renal diet during hospital stay; renal function appears stable. -180 lbs x 4 months, wt appears stable.  Nutrition Discharge Planning: heart healthy diet    Nutrition  "Risk Screen    Nutrition Risk Screen: no indicators present    Nutrition/Diet History    Patient Reported Diet/Restrictions/Preferences: general  Typical Food/Fluid Intake: daughter makes meals  Food Preferences: pt craved junk foods during chemo treatments    Anthropometrics    Temp: 99 °F (37.2 °C)  Height: 5' 6" (167.6 cm)  Height (inches): 66 in  Weight Method: Bed Scale  Weight: 82.1 kg (181 lb)  Weight (lb): 181 lb  Ideal Body Weight (IBW), Female: 130 lb  % Ideal Body Weight, Female (lb): 139.23 %  BMI (Calculated): 29.2       Lab/Procedures/Meds    Pertinent Labs Reviewed: reviewed  Pertinent Labs Comments: H/H 8.9/29.7, A1c 6.6% (3/28/22)  Pertinent Medications Reviewed: reviewed  Pertinent Medications Comments: vitamin C/B12, gabapentin, magnesium oxide, protonix, senna, colace    Physical Findings/Assessment         Estimated/Assessed Needs    Weight Used For Calorie Calculations: 82.1 kg (181 lb)  Energy Calorie Requirements (kcal): 1704 kcal  Energy Need Method: Beltrami-St Jeor (PAL 1.25)  Protein Requirements: 82-99g  Weight Used For Protein Calculations: 82.1 kg (181 lb)        RDA Method (mL): 1704         Nutrition Prescription Ordered    Current Diet Order: Cardiac    Evaluation of Received Nutrient/Fluid Intake    Comments: last BM 7/13  % Intake of Estimated Energy Needs: 0 - 25 %  % Meal Intake: 0 - 25 %    Nutrition Risk    Level of Risk/Frequency of Follow-up: low       Monitor and Evaluation    Food and Nutrient Intake: energy intake, food and beverage intake  Food and Nutrient Adminstration: diet order  Knowledge/Beliefs/Attitudes: food and nutrition knowledge/skill  Anthropometric Measurements: weight, weight change  Biochemical Data, Medical Tests and Procedures: electrolyte and renal panel, gastrointestinal profile, glucose/endocrine profile, inflammatory profile, lipid profile  Nutrition-Focused Physical Findings: overall appearance, extremities, muscles and bones, head and eyes, skin "       Nutrition Follow-Up    RD Follow-up?: Yes

## 2022-07-13 NOTE — PLAN OF CARE
Problem: Oral Intake Inadequate  Goal: Improved Oral Intake  Outcome: Ongoing, Progressing   Recommendations     1. Liberalize diet to regular.  2. Add Boost Glucose Control TID.     Goals: 1. Pt's intake meals >50% by RD follow up.  Nutrition Goal Status: new  Communication of RD Recs: reviewed with physician     Assessment and Plan  Nutrition Problem  Inadequate oral intake     Related to (etiology):   Decreased appetite     Signs and Symptoms (as evidenced by):   Pt with abdominal pain, intake meals 0-25% over last week in hospital.      Interventions(treatment strategy):  Collaboration of care with providers.  General healthy diet.  Commercial beverage: Boost Glucose Control TID     Nutrition Diagnosis Status:   New

## 2022-07-14 LAB
ANION GAP SERPL CALC-SCNC: 10 MMOL/L (ref 8–16)
BASOPHILS # BLD AUTO: 0.02 K/UL (ref 0–0.2)
BASOPHILS NFR BLD: 0.5 % (ref 0–1.9)
BUN SERPL-MCNC: 13 MG/DL (ref 8–23)
CALCIUM SERPL-MCNC: 8 MG/DL (ref 8.7–10.5)
CHLORIDE SERPL-SCNC: 108 MMOL/L (ref 95–110)
CO2 SERPL-SCNC: 23 MMOL/L (ref 23–29)
CREAT SERPL-MCNC: 0.8 MG/DL (ref 0.5–1.4)
DIFFERENTIAL METHOD: ABNORMAL
EOSINOPHIL # BLD AUTO: 0.1 K/UL (ref 0–0.5)
EOSINOPHIL NFR BLD: 1.6 % (ref 0–8)
ERYTHROCYTE [DISTWIDTH] IN BLOOD BY AUTOMATED COUNT: 15.8 % (ref 11.5–14.5)
EST. GFR  (AFRICAN AMERICAN): >60 ML/MIN/1.73 M^2
EST. GFR  (NON AFRICAN AMERICAN): >60 ML/MIN/1.73 M^2
GLUCOSE SERPL-MCNC: 104 MG/DL (ref 70–110)
HCT VFR BLD AUTO: 30.8 % (ref 37–48.5)
HGB BLD-MCNC: 9.1 G/DL (ref 12–16)
IMM GRANULOCYTES # BLD AUTO: 0.03 K/UL (ref 0–0.04)
IMM GRANULOCYTES NFR BLD AUTO: 0.7 % (ref 0–0.5)
LYMPHOCYTES # BLD AUTO: 1.1 K/UL (ref 1–4.8)
LYMPHOCYTES NFR BLD: 25.9 % (ref 18–48)
MAGNESIUM SERPL-MCNC: 1.5 MG/DL (ref 1.6–2.6)
MCH RBC QN AUTO: 25.7 PG (ref 27–31)
MCHC RBC AUTO-ENTMCNC: 29.5 G/DL (ref 32–36)
MCV RBC AUTO: 87 FL (ref 82–98)
MONOCYTES # BLD AUTO: 0.4 K/UL (ref 0.3–1)
MONOCYTES NFR BLD: 9.8 % (ref 4–15)
NEUTROPHILS # BLD AUTO: 2.6 K/UL (ref 1.8–7.7)
NEUTROPHILS NFR BLD: 61.5 % (ref 38–73)
NRBC BLD-RTO: 0 /100 WBC
PHOSPHATE SERPL-MCNC: 3 MG/DL (ref 2.7–4.5)
PLATELET # BLD AUTO: 277 K/UL (ref 150–450)
PMV BLD AUTO: 11.3 FL (ref 9.2–12.9)
POTASSIUM SERPL-SCNC: 3.1 MMOL/L (ref 3.5–5.1)
RBC # BLD AUTO: 3.54 M/UL (ref 4–5.4)
SODIUM SERPL-SCNC: 141 MMOL/L (ref 136–145)
WBC # BLD AUTO: 4.29 K/UL (ref 3.9–12.7)

## 2022-07-14 PROCEDURE — 83735 ASSAY OF MAGNESIUM: CPT | Performed by: HOSPITALIST

## 2022-07-14 PROCEDURE — 85025 COMPLETE CBC W/AUTO DIFF WBC: CPT | Performed by: HOSPITALIST

## 2022-07-14 PROCEDURE — 97535 SELF CARE MNGMENT TRAINING: CPT

## 2022-07-14 PROCEDURE — 25000003 PHARM REV CODE 250: Performed by: NURSE PRACTITIONER

## 2022-07-14 PROCEDURE — 36415 COLL VENOUS BLD VENIPUNCTURE: CPT | Performed by: HOSPITALIST

## 2022-07-14 PROCEDURE — 63600175 PHARM REV CODE 636 W HCPCS: Performed by: NURSE PRACTITIONER

## 2022-07-14 PROCEDURE — 25000003 PHARM REV CODE 250: Performed by: HOSPITALIST

## 2022-07-14 PROCEDURE — 11000004 HC SNF PRIVATE

## 2022-07-14 PROCEDURE — 80048 BASIC METABOLIC PNL TOTAL CA: CPT | Performed by: HOSPITALIST

## 2022-07-14 PROCEDURE — 84100 ASSAY OF PHOSPHORUS: CPT | Performed by: HOSPITALIST

## 2022-07-14 PROCEDURE — 97110 THERAPEUTIC EXERCISES: CPT

## 2022-07-14 PROCEDURE — 63600175 PHARM REV CODE 636 W HCPCS: Performed by: HOSPITALIST

## 2022-07-14 RX ORDER — ENOXAPARIN SODIUM 100 MG/ML
40 INJECTION SUBCUTANEOUS EVERY 24 HOURS
Status: DISCONTINUED | OUTPATIENT
Start: 2022-07-14 | End: 2022-08-01 | Stop reason: HOSPADM

## 2022-07-14 RX ORDER — POTASSIUM CHLORIDE 20 MEQ/1
40 TABLET, EXTENDED RELEASE ORAL 2 TIMES DAILY
Status: COMPLETED | OUTPATIENT
Start: 2022-07-14 | End: 2022-07-15

## 2022-07-14 RX ORDER — ALUMINUM HYDROXIDE, MAGNESIUM HYDROXIDE, AND SIMETHICONE 2400; 240; 2400 MG/30ML; MG/30ML; MG/30ML
30 SUSPENSION ORAL EVERY 6 HOURS PRN
Status: DISCONTINUED | OUTPATIENT
Start: 2022-07-14 | End: 2022-08-01 | Stop reason: HOSPADM

## 2022-07-14 RX ADMIN — Medication 400 MG: at 09:07

## 2022-07-14 RX ADMIN — SENNOSIDES AND DOCUSATE SODIUM 1 TABLET: 50; 8.6 TABLET ORAL at 09:07

## 2022-07-14 RX ADMIN — MICONAZOLE NITRATE 2 % TOPICAL POWDER: at 09:07

## 2022-07-14 RX ADMIN — OXYCODONE HYDROCHLORIDE AND ACETAMINOPHEN 1000 MG: 500 TABLET ORAL at 08:07

## 2022-07-14 RX ADMIN — GABAPENTIN 300 MG: 300 CAPSULE ORAL at 09:07

## 2022-07-14 RX ADMIN — OXYCODONE HYDROCHLORIDE 10 MG: 10 TABLET ORAL at 05:07

## 2022-07-14 RX ADMIN — METHENAMINE HIPPURATE 1 G: 1 TABLET ORAL at 08:07

## 2022-07-14 RX ADMIN — MENTHOL, METHYL SALICYLATE: 10; 15 CREAM TOPICAL at 09:07

## 2022-07-14 RX ADMIN — CYANOCOBALAMIN TAB 1000 MCG 1000 MCG: 1000 TAB at 09:07

## 2022-07-14 RX ADMIN — SENNOSIDES AND DOCUSATE SODIUM 1 TABLET: 50; 8.6 TABLET ORAL at 08:07

## 2022-07-14 RX ADMIN — OXYCODONE HYDROCHLORIDE 10 MG: 10 TABLET ORAL at 12:07

## 2022-07-14 RX ADMIN — OXYCODONE HYDROCHLORIDE 10 MG: 10 TABLET ORAL at 09:07

## 2022-07-14 RX ADMIN — ATORVASTATIN CALCIUM 40 MG: 40 TABLET, FILM COATED ORAL at 05:07

## 2022-07-14 RX ADMIN — METHENAMINE HIPPURATE 1 G: 1 TABLET ORAL at 09:07

## 2022-07-14 RX ADMIN — ENOXAPARIN SODIUM 40 MG: 100 INJECTION SUBCUTANEOUS at 05:07

## 2022-07-14 RX ADMIN — CEFTRIAXONE 1 G: 1 INJECTION, SOLUTION INTRAVENOUS at 09:07

## 2022-07-14 RX ADMIN — PANTOPRAZOLE SODIUM 40 MG: 40 TABLET, DELAYED RELEASE ORAL at 08:07

## 2022-07-14 RX ADMIN — METOPROLOL SUCCINATE 25 MG: 25 TABLET, EXTENDED RELEASE ORAL at 08:07

## 2022-07-14 RX ADMIN — OXYCODONE HYDROCHLORIDE AND ACETAMINOPHEN 1000 MG: 500 TABLET ORAL at 09:07

## 2022-07-14 RX ADMIN — MENTHOL, METHYL SALICYLATE: 10; 15 CREAM TOPICAL at 10:07

## 2022-07-14 RX ADMIN — AMLODIPINE BESYLATE 10 MG: 10 TABLET ORAL at 08:07

## 2022-07-14 RX ADMIN — MENTHOL, METHYL SALICYLATE: 10; 15 CREAM TOPICAL at 03:07

## 2022-07-14 RX ADMIN — Medication 400 MG: at 08:07

## 2022-07-14 RX ADMIN — Medication 6 MG: at 09:07

## 2022-07-14 RX ADMIN — POTASSIUM CHLORIDE 40 MEQ: 1500 TABLET, EXTENDED RELEASE ORAL at 09:07

## 2022-07-14 NOTE — PROGRESS NOTES
[facility] - Skilled Nursing  Wound Care    Patient Name:  Patito Domingo   MRN:  [id number]  Date: 7/14/2022  Diagnosis: <principal problem not specified>    History:     Past Medical History:   Diagnosis Date    Allergy     Arthritis     Cataract     Chronic diastolic (congestive) heart failure     Colon cancer     Encounter for blood transfusion     History of ESBL E. 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)        Social History     Socioeconomic History    Marital status:    Occupational History    Occupation: disabled    Tobacco Use    Smoking status: Never Smoker    Smokeless tobacco: Never Used   Substance and Sexual Activity    Alcohol use: No     Alcohol/week: 0.0 standard drinks    Drug use: No    Sexual activity: Not Currently   Social History Narrative    Pt lives with son     Social Determinants of Health     Financial Resource Strain: Medium Risk    Difficulty of Paying Living Expenses: Somewhat hard   Food Insecurity: No Food Insecurity    Worried About Running Out of Food in the Last Year: Never true    Ran Out of Food in the Last Year: Never true   Transportation Needs: No Transportation Needs    Lack of Transportation (Medical): No    Lack of Transportation (Non-Medical): No   Physical Activity: Inactive    Days of Exercise per Week: 0 days    Minutes of Exercise per Session: 0 min   Stress: No Stress Concern Present    Feeling of Stress : Only a little   Social Connections: Socially Isolated    Frequency of Communication with Friends and Family: Three times a week    Frequency of Social Gatherings with Friends and Family: Once a week    Attends [employer] Services: Never    Active Member of Clubs or Organizations: No    Attends  Club or Organization Meetings: Never    Marital Status:    Housing Stability: Low Risk     Unable to Pay for Housing in the Last Year: No    Number of Places Lived in the Last Year: 1    Unstable Housing in the Last Year: No       Precautions:     Allergies as of 07/12/2022 - Reviewed 07/12/2022   Allergen Reaction Noted    Contrast media Hives, Itching, and Swelling 09/24/2014    Epinephrine Anaphylaxis 09/24/2014    Ibuprofen Hives, Itching, and Swelling 09/24/2014    Zofran [ondansetron hcl] Itching 03/04/2022    Iodinated contrast media      Morphine Other (See Comments) 04/03/2022    Sulfa (sulfonamide antibiotics) Hives, Itching, and Swelling 09/24/2014       WOC Assessment Details/Treatment   Wound care consulted for buttocks  The right buttock has a partial thickness skin loss with remains of blistered area to josé miguel-wound.  The wound edges are open irregular.    We discussed wound care treatment, repositioning- EHOB waffle overlay- verbalized understanding.    Plan   Right buttock- Triad ointment daily then cover with border gauze/telfa island dressing  Triad will assist with moisture management, moist wound healing, and autolytic debridement of non-viable tissue   EHOB waffle overlay with chair cushion    Nursing to continue care, pressure prevention measures  Wound care will follow-up   Recommendations made to primary team for above plan per written report . Orders placed.      07/14/22 0845        Altered Skin Integrity 07/07/22 1104 Right Buttocks Shearing Partial thickness tissue loss. Shallow open ulcer with a red or pink wound bed, without slough. Intact or Open/Ruptured Serum-filled blister.   Date First Assessed/Time First Assessed: 07/07/22 1104   Altered Skin Integrity Present on Admission: suspected hospital acquired  Side: Right  Location: Buttocks  Is this injury device related?: No  Primary Wound Type: Shearing  Description of Altere...   Dressing Appearance Intact;Moist drainage    Drainage Amount Scant   Drainage Characteristics/Odor Serosanguineous   Appearance Red;Moist   Tissue loss description Partial thickness   Periwound Area Intact;Other (see comments)  (resolved blistered area)   Wound Edges Open   Wound Length (cm) 6 cm   Wound Width (cm) 3 cm   Wound Depth (cm) 0.2 cm   Wound Volume (cm^3) 3.6 cm^3   Wound Surface Area (cm^2) 18 cm^2   Care Cleansed with:;Sterile normal saline;Applied:;Skin Barrier   Dressing Removed;Foam;Applied;Island/border   Periwound Care Moisture barrier applied     07/14/2022

## 2022-07-14 NOTE — PROGRESS NOTES
[facility]                                  Skilled Nursing Facility                   Progress Note     Admit Date: 7/12/2022  JULIO C TBD  Principal Problem:  Bacteremia   HPI obtained from patient interview and chart review     Chief Complaint: .Re-evaluation of medical treatment and therapy status: Lab review, hypokalemia, hypomagnesemia    HPI:   Mrs. 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  Admission to SNF for secondary weakness and debility.     Interval history: All of today's labs reviewed and are listed below.  K 3.1, Mag 1.5.  24 hr vital sign ranges listed below.  RLE pain persist.  She states that muscle Rahul PO medications to help relieve it.  She is not interested in any increase in medications at this time.  She refuse therapy today.  Patient denies shortness of breath, abdominal discomfort, nausea, or vomiting.  Patient reports an adequate appetite.  Patient denies dysuria.  Patient reports having regular bowel movements. Continuing to follow and treat all acute and chronic conditions.    Past Medical History: Patient has a past medical history of Allergy, Arthritis, Cataract, Chronic diastolic (congestive) heart failure, Colon cancer, Encounter for blood transfusion, History of ESBL E. 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    Past Surgical History: Patient has a past surgical history that includes Liver biopsy (9/14); Lithotripsy; cystoscope; Hysterectomy (5/1996); Colon surgery; Eye surgery; Cataract extraction (Left, 10/2017);  Cholecystectomy; Cystoscopy w/ retrogrades (Right, 10/10/2019); Ureteroscopy (Right, 10/10/2019);  section; Uterine fibroid surgery; Abdominal surgery; ERCP (N/A, 2021); and ERCP (N/A, 3/4/2022).    Social History: Patient reports that she has never smoked. She has never used smokeless tobacco. She reports that she does not drink alcohol and does not use drugs.    Family History: family history includes Alzheimer's disease in her father; Cancer in her mother; No Known Problems in her son, son, son, and son; Stroke in her sister.    Allergies: Patient is allergic to contrast media, epinephrine, ibuprofen, zofran [ondansetron hcl], iodinated contrast media, morphine, and sulfa (sulfonamide antibiotics).    ROS  Constitutional: Negative for fever   Eyes: Negative for blurred vision, double vision   Respiratory: Negative for cough, shortness of breath   Cardiovascular: Negative for chest pain, palpitations, and leg swelling.   Gastrointestinal: Negative for abdominal pain, constipation, diarrhea, nausea, vomiting.   Genitourinary: Negative for dysuria, frequency   Musculoskeletal:  + generalized weakness.  +RLE pain  Skin: Negative for itching and rash.   Neurological: Negative for dizziness, headaches.   Psychiatric/Behavioral: Negative for depression. The patient is not nervous/anxious.      24 hour Vital Sign Range   Temp:  [97.4 °F (36.3 °C)-98.4 °F (36.9 °C)]   Pulse:  [77-87]   Resp:  [14-18]   BP: (123-146)/(60-65)   SpO2:  [95 %-98 %]     PEx  Constitutional: Patient appears debilitated.  No distress noted  HENT:   Head: Normocephalic and atraumatic.   Eyes: Pupils are equal, round  Neck: Normal range of motion. Neck supple.   Cardiovascular: Normal rate, regular rhythm and normal heart sounds.    Pulmonary/Chest: Effort normal and breath sounds are clear  Abdominal: Soft. Bowel sounds are normal.   Musculoskeletal: Normal range of motion.   Neurological: Alert and oriented to person, place, and  time.   Psychiatric: Normal mood and affect. Behavior is normal.   Skin: Skin is warm and dry.     Altered Skin Integrity 07/07/22 1104 Right Buttocks Shearing Partial thickness tissue loss. Shallow open ulcer with a red or pink wound bed, without slough. Intact or Open/Ruptured Serum-filled blister.   Date First Assessed/Time First Assessed: 07/07/22 1104   Altered Skin Integrity Present on Admission: suspected hospital acquired  Side: Right  Location: Buttocks  Is this injury device related?: No  Primary Wound Type: Shearing  Description of Altere...   Dressing Appearance Intact;Moist drainage   Drainage Amount Scant   Drainage Characteristics/Odor Serosanguineous   Appearance Red;Moist   Tissue loss description Partial thickness   Periwound Area Intact;Other (see comments)  (resolved blistered area)   Wound Edges Open   Wound Length (cm) 6 cm   Wound Width (cm) 3 cm   Wound Depth (cm) 0.2 cm   Wound Volume (cm^3) 3.6 cm^3   Wound Surface Area (cm^2) 18 cm^2   Care Cleansed with:;Sterile normal saline;Applied:;Skin Barrier         Recent Labs   Lab 07/14/22  0603      K 3.1*      CO2 23   BUN 13   CREATININE 0.8   MG 1.5*       Recent Labs   Lab 07/14/22  0603   WBC 4.29   RBC 3.54*   HGB 9.1*   HCT 30.8*      MCV 87   MCH 25.7*   MCHC 29.5*         Assessment and Plan:    Hypokalemia  Hypomagnesemia  - initiated potassium 40 mEq BID x2 doses, magnesium oxide 400 mg BID x2 days    Bacteremia  UTI   - presence of large bilateral renal stones complicates picture, although non-obstructive  - bilateral DVT US is negative  - repeat blood cx -ngtd  - continue CTX 1g IV q 24H - will need 2 weeks from negative cx - start date is 7/2- end date is 7/18  - possible that fever is related to carcinoid     RLE pain  - previous ultrasound negative for DVT  - increased oxycodone 10 mg to q.6 hours PRN, ordered for BenGay topical cream TID     NSTEMI (non-ST elevated myocardial infarction)  - Trop trending down.  No EKG changes or c/o chest pain  - TTE reviewed.  - follow-up with Cardiology as outpatient     Chronic diastolic (congestive) heart failure  - No signs of decompensation  - Cont metoprolol  - TTE showing grade I diastolic dysfunction  - Monitor I&O      HLD (hyperlipidemia)  - Continue atorvastatin      Anemia of chronic disease  - continue monitor twice weekly CBCs, transfuse for hemoglobin < 7    Essential hypertension  - continue amlodipine 10 mg daily, metoprolol XL 25 mg daily.  Home losartan on hold due to previous FLORECITA    Metastatic carcinoid tumor  - ER physician discussed the case with Dr. Perez with neuroendocrine services  - Continue follow up outpatient as scheduled.  Not receiving any further treatment      Neuropathy pain  - continue gabapentin 30 mg qHS.    Palliative care encounter  Notes per [facility] NP  - Pt elects to remain full code/full treatment status  - She does not have advance directives.  She is  and has 4 sons. If she becomes unable to make medical decisions for herself, she would want them to share surrogate decision making responsibility  - Her primary goal at this time is to regain strength to be able to go home, although she does mention that she is lonely at home.She understands that she is high-risk for readmission based on hospitalizations required over the last 6 months but feels that hospital encounters are not affecting her quality of life.  She is satisfied with home-based palliative care and would like for that to continue after SNF.   - Her goals include to regain strength and to extend life regardless of treatment burden. She confirms that she does want to continue cancer treatments.     Debility   - Continue with PT/OT for gait training and strengthening and restoration of ADL's   - Encourage mobility, OOB in chair, and early ambulation as appropriate  - Fall precautions   - Monitor for bowel and bladder dysfunction  - Monitor for and prevent skin  breakdown and pressure ulcers  - continue DVT prophylaxis with  Lovenox      Anticipate disposition:  Home with home health      Follow-up needed during SNF stay-    Appointment to send patient to- home visit on 07/15    Follow-up needed after discharge from SNF:   - PCP, hospital follow-up  - neuroendocrine service  - Cardiology    Future Appointments   Date Time Provider Department Center   7/15/2022  8:00 AM Amy Bright NP [facility] C3[facility] [place]   9/1/2022  9:20 AM Ervin Parikh MD [facility] UROLOGY [facility]   9/21/2022 10:40 AM DO TARAH Savage [facility] [place]       Shirley Canas NP  Department of Hospital Medicine   [facility]     DOS: 7/14/2022       Patient note was created using MModal Dictation.  Any errors in syntax or even information may not have been identified and edited on initial review prior to signing this note.

## 2022-07-14 NOTE — PT/OT/SLP PROGRESS
Physical Therapy      Patient Name:  Patito Domingo   MRN:  [id number]    Patient not seen today secondary to pt in nursing care first attempt, and pt refusal second attempt. I was unable to return for a 3rd attempt. Will follow-up per PT POC.

## 2022-07-14 NOTE — CLINICAL REVIEW
Clinical Pharmacy Chart Review Note      Admit Date: 7/12/2022   LOS: 2 days       Patito Domingo is a 71 y.o. female admitted to SNF for PT/OT after hospitalization for sepsis.    Review of patient's allergies indicates:   Allergen Reactions    Contrast media Hives, Itching and Swelling    Epinephrine Anaphylaxis     Can cause  a Carcinoid Crisis    Ibuprofen Hives, Itching and Swelling    Zofran [ondansetron hcl] Itching     And multiple other reactions    Iodinated contrast media     Morphine Other (See Comments)    Sulfa (sulfonamide antibiotics) Hives, Itching and Swelling     Patient Active Problem List    Diagnosis Date Noted    Palliative care encounter 07/11/2022    Non-traumatic rhabdomyolysis 07/04/2022    Bacteremia 07/04/2022    Sepsis 07/03/2022    NSTEMI (non-ST elevated myocardial infarction) 07/03/2022    Lactic acidosis 07/03/2022    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 04/04/2022    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    Fascial hernia 04/03/2022    Malignant carcinoid tumor of midgut 03/27/2022    Chronic kidney disease, stage 4 (severe) 03/17/2022    Chronic diastolic (congestive) heart failure 03/02/2022    Dilation of biliary tract 03/02/2022    FLORECITA (acute kidney injury)     Diarrhea 04/11/2021    HLD (hyperlipidemia) 03/08/2021    Staghorn calculus 10/15/2020    Episodic mood disorder 12/03/2019    Essential hypertension 11/19/2019    Anemia of chronic disease 11/19/2019    Shoulder pain 11/18/2019    Chronic pain syndrome 12/02/2018    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 11/30/2018    Acute cystitis with hematuria 10/08/2018    Recurrent UTI 04/28/2018    Arthritis 12/29/2017    Metastatic carcinoid tumor 01/27/2015    Neuroendocrine carcinoma, unknown primary site 12/01/2014       Scheduled Meds:    amLODIPine  10 mg Oral Daily    ascorbic acid (vitamin C)  1,000 mg Oral BID    atorvastatin  40 mg Oral  Daily PM    cefTRIAXone (ROCEPHIN) IVPB  1 g Intravenous Q24H    cyanocobalamin  1,000 mcg Oral Daily    gabapentin  300 mg Oral QHS    LIDOcaine  1 patch Transdermal Q24H    magnesium oxide  400 mg Oral BID    methenamine  1 g Oral BID    methyl salicylate-menthol 15-10%   Topical (Top) TID    metoprolol succinate  25 mg Oral Daily    miconazole NITRATE 2 %   Topical (Top) BID    pantoprazole  40 mg Oral Daily    senna-docusate 8.6-50 mg  1 tablet Oral BID     Continuous Infusions:   PRN Meds: acetaminophen, baclofen, calcium carbonate, dicyclomine, meclizine, melatonin, oxyCODONE    OBJECTIVE:     Vital Signs (Last 24H)  Temp:  [97.4 °F (36.3 °C)-98.4 °F (36.9 °C)]   Pulse:  [77-87]   Resp:  [14-18]   BP: (123-146)/(60-65)   SpO2:  [95 %-98 %]     Laboratory:  CBC:   Recent Labs   Lab 07/08/22  0605 07/10/22  0519 07/14/22  0603   WBC 4.85 3.50* 4.29   RBC 3.42* 3.44* 3.54*   HGB 8.8* 8.9* 9.1*   HCT 29.1* 29.7* 30.8*    210 277   MCV 85 86 87   MCH 25.7* 25.9* 25.7*   MCHC 30.2* 30.0* 29.5*     BMP:   Recent Labs   Lab 07/08/22  0605 07/10/22  0519 07/11/22  0801 07/14/22  0603    100 101 104    143 143 141   K 3.3* 3.0* 3.7 3.1*    108 109 108   CO2 20* 22* 23 23   BUN 22 18 18 13   CREATININE 1.0 0.9 0.8 0.8   CALCIUM 8.3* 8.4* 8.0* 8.0*   MG 1.4*  --   --  1.5*     CMP:   Recent Labs   Lab 07/10/22  0519 07/11/22  0801 07/14/22  0603    101 104   CALCIUM 8.4* 8.0* 8.0*    143 141   K 3.0* 3.7 3.1*   CO2 22* 23 23    109 108   BUN 18 18 13   CREATININE 0.9 0.8 0.8         ASSESSMENT/PLAN:     I have reviewed the medications in compliance with CMS Regulation F756 of the [place]. Based on information gathered, the following items may need to be addressed:    **According to PMH and home medication list, patient takes the following medications at home. These medications are not currently ordered at SNF:  · Losartan 100 mg daily (held at [facility] d/t  FLORECITA)    **Dicyclomine is ordered as needed for abdominal cramps. This medication is a Beers drug and is not recommended for use in older adults.Monitor use and associated side effects. Consider discontinuation if patient is not using or adverse effects observed.  Beers Criteria: Avoid use in elderly patients as an antispasmodic due to highly anticholinergic effects and uncertain effectiveness. In particular, avoid in patients with delirium or at high risk for delirium as use may cause or worsen condition, in patients with dementia or cognitive impairment due to the risk of adverse CNS effects, and in men with lower urinary tract symptoms or benign prostatic hyperplasia as decreased urinary flow and urinary retention may occur        Medications reviewed by PharmD, please re-consult if needed.      Genevieve Lange, Pharm. D.  Clinical Pharmacist  [facility]

## 2022-07-14 NOTE — PT/OT/SLP PROGRESS
"Occupational Therapy   Treatment    Name: Patito Domingo  MRN: [id number]  Admit Date: 7/12/2022  Admitting Diagnosis:  <principal problem not specified>    General Precautions: Standard, fall   Orthopedic Precautions:N/A   Braces: N/A     Recommendations:     Discharge Recommendations: home health OT  Level of Assistance Recommended at Discharge: 24 hours light assistance for ADL's and homemaking tasks  Discharge Equipment Recommendations:  bedside commode, bath bench, grab bar, walker, rolling, wheelchair  Barriers to discharge:  Decreased caregiver support    Assessment:     Patito Domingo is a 71 y.o. female with a medical diagnosis of <principal problem not specified> .  She presents with significant pain on this date limiting activities. Pt. Did tolerate transfer to bedside commode  For toileting purposes and there ex on this date. Pt. Required CGA/Min A for transfers with RW.  Pt. Educated on importance of OOB and therapy for improvements as well as changing position in bed to prevent further skin breakdown. Pt. Verbalized understanding of the above recommendations.   Performance deficits affecting function are weakness, impaired endurance, impaired self care skills, impaired functional mobilty, gait instability, pain, edema, decreased lower extremity function.     Rehab Potential is good    Activity tolerance:  Fair    Plan:     Patient to be seen 6 x/week to address the above listed problems via self-care/home management, therapeutic activities, therapeutic exercises, neuromuscular re-education    · Plan of Care Expires: 08/13/22  · Plan of Care Reviewed with: patient    Subjective   " Put the purewick in I have to go to the bathroom. OT explained to pt. She would need to transfer to bedside commode for task.   Communicated with: nurse prior to session.  .    Pain/Comfort:  · Pain Rating 1: 9/10  · Location - Side 1: Right  · Location - Orientation 1: upper  · Location 1: thigh  · Pain Addressed 1: " Distraction, Pre-medicate for activity, Reposition, Nurse notified  · Pain Rating Post-Intervention 1: 8/10    Patient's cultural, spiritual, Muslim conflicts given the current situation:  · no    Objective:     Patient found supine with PICC line upon OT entry to room.  OT came back for  second attempt due to pt. Having just received pain meds on first attempt and nursing also applied ointment to leg to assist with pain management    Bed Mobility:    · Patient completed Rolling/Turning to Right with minimum assistance at RLE to initially bring leg  To edge of bed  · Patient completed Scooting/Bridging with stand by assistance with vc's for proper technique  · Patient completed Supine to Sit with minimum assistance at RLE  · Patient completed Sit to Supine with minimum assistance at RLE    Functional Mobility/Transfers:  · Patient completed Sit <> Stand Transfer with contact guard assistance  with  rolling walker with FFP noted in stand  · Patient completed Toilet Transfer Stand Pivot technique with contact guard assistance with  rolling walker and FFP noted  · Functional Mobility: not tested    Activities of Daily Living:  · Toileting: maximal assistance for clothing managment but able to clean self in seated position through weight shifting    [facility] 6 Click ADL: 16    OT Exercises: AROM BUE/BLE x 2 sets 10 reps for all major planes of BUE motion and tolerable LE motions    Treatment & Education:  Pt. Educated on repositioning in bed to maintain skin integrity  Pt. Educated on need for therapy/OOB to get better   Pt. Educated on need to use BSC with staff assist for safety and not to use pure wick    Patient left left sidelying with call button in reachEducation:      GOALS:   Multidisciplinary Problems     Occupational Therapy Goals        Problem: Occupational Therapy    Goal Priority Disciplines Outcome Interventions   Occupational Therapy Goal     OT, PT/OT Ongoing, Progressing    Description: Goals to be  met by: 30 days     Patient will increase functional independence with ADLs by performing:    Feeding with Modified [place].  UE Dressing with Set-up Assistance.  LE Dressing with Minimal Assistance.  Grooming while seated at sink with Modified [place].  Toileting from toilet or BSC with Minimal Assistance for hygiene and clothing management.   Bathing from  sitting at sink with Minimal Assistance.  Supine to sit with Stand-by Assistance.  Step transfer with Contact Guard Assistance with RW.  Upper extremity exercise with supervision.  Caregiver will be educated on level of assist required to safely perform self care tasks and functional transfers..                      Time Tracking:     OT Date of Treatment: 07/14/22  OT Start Time: 1358    OT Stop Time: 1423  OT Total Time (min): 25 min    Billable Minutes:Self Care/Home Management 15  Therapeutic Exercise 10    7/14/2022

## 2022-07-15 PROCEDURE — 25000003 PHARM REV CODE 250: Performed by: NURSE PRACTITIONER

## 2022-07-15 PROCEDURE — 99306 1ST NF CARE HIGH MDM 50: CPT | Mod: AI,,, | Performed by: HOSPITALIST

## 2022-07-15 PROCEDURE — 97530 THERAPEUTIC ACTIVITIES: CPT | Mod: CQ

## 2022-07-15 PROCEDURE — 63600175 PHARM REV CODE 636 W HCPCS: Performed by: NURSE PRACTITIONER

## 2022-07-15 PROCEDURE — 97110 THERAPEUTIC EXERCISES: CPT | Mod: CQ

## 2022-07-15 PROCEDURE — 97535 SELF CARE MNGMENT TRAINING: CPT | Mod: CO

## 2022-07-15 PROCEDURE — 25000003 PHARM REV CODE 250: Performed by: HOSPITALIST

## 2022-07-15 PROCEDURE — 99306 PR NURSING FACILITY CARE, INIT, HIGH SEVERITY: ICD-10-PCS | Mod: AI,,, | Performed by: HOSPITALIST

## 2022-07-15 PROCEDURE — 11000004 HC SNF PRIVATE

## 2022-07-15 RX ORDER — DICLOFENAC SODIUM 10 MG/G
4 GEL TOPICAL 2 TIMES DAILY
Status: DISCONTINUED | OUTPATIENT
Start: 2022-07-15 | End: 2022-08-01 | Stop reason: HOSPADM

## 2022-07-15 RX ADMIN — METOPROLOL SUCCINATE 25 MG: 25 TABLET, EXTENDED RELEASE ORAL at 10:07

## 2022-07-15 RX ADMIN — ENOXAPARIN SODIUM 40 MG: 100 INJECTION SUBCUTANEOUS at 05:07

## 2022-07-15 RX ADMIN — Medication 6 MG: at 09:07

## 2022-07-15 RX ADMIN — METHENAMINE HIPPURATE 1 G: 1 TABLET ORAL at 10:07

## 2022-07-15 RX ADMIN — AMLODIPINE BESYLATE 10 MG: 10 TABLET ORAL at 10:07

## 2022-07-15 RX ADMIN — OXYCODONE HYDROCHLORIDE 10 MG: 10 TABLET ORAL at 07:07

## 2022-07-15 RX ADMIN — MENTHOL, METHYL SALICYLATE: 10; 15 CREAM TOPICAL at 10:07

## 2022-07-15 RX ADMIN — MICONAZOLE NITRATE 2 % TOPICAL POWDER: at 10:07

## 2022-07-15 RX ADMIN — OXYCODONE HYDROCHLORIDE AND ACETAMINOPHEN 1000 MG: 500 TABLET ORAL at 10:07

## 2022-07-15 RX ADMIN — OXYCODONE HYDROCHLORIDE 10 MG: 10 TABLET ORAL at 09:07

## 2022-07-15 RX ADMIN — Medication 400 MG: at 10:07

## 2022-07-15 RX ADMIN — DICLOFENAC SODIUM 4 G: 10 GEL TOPICAL at 11:07

## 2022-07-15 RX ADMIN — OXYCODONE HYDROCHLORIDE AND ACETAMINOPHEN 1000 MG: 500 TABLET ORAL at 09:07

## 2022-07-15 RX ADMIN — PANTOPRAZOLE SODIUM 40 MG: 40 TABLET, DELAYED RELEASE ORAL at 10:07

## 2022-07-15 RX ADMIN — MENTHOL, METHYL SALICYLATE: 10; 15 CREAM TOPICAL at 09:07

## 2022-07-15 RX ADMIN — POTASSIUM CHLORIDE 40 MEQ: 1500 TABLET, EXTENDED RELEASE ORAL at 10:07

## 2022-07-15 RX ADMIN — MENTHOL, METHYL SALICYLATE: 10; 15 CREAM TOPICAL at 03:07

## 2022-07-15 RX ADMIN — ATORVASTATIN CALCIUM 40 MG: 40 TABLET, FILM COATED ORAL at 05:07

## 2022-07-15 RX ADMIN — METHENAMINE HIPPURATE 1 G: 1 TABLET ORAL at 09:07

## 2022-07-15 RX ADMIN — LIDOCAINE 1 PATCH: 50 PATCH CUTANEOUS at 06:07

## 2022-07-15 RX ADMIN — Medication 400 MG: at 09:07

## 2022-07-15 RX ADMIN — MICONAZOLE NITRATE 2 % TOPICAL POWDER: at 09:07

## 2022-07-15 RX ADMIN — CEFTRIAXONE 1 G: 1 INJECTION, SOLUTION INTRAVENOUS at 10:07

## 2022-07-15 RX ADMIN — CYANOCOBALAMIN TAB 1000 MCG 1000 MCG: 1000 TAB at 09:07

## 2022-07-15 RX ADMIN — OXYCODONE HYDROCHLORIDE 10 MG: 10 TABLET ORAL at 03:07

## 2022-07-15 NOTE — PLAN OF CARE
Problem: Occupational Therapy  Goal: Occupational Therapy Goal  Description: Goals to be met by: 30 days     Patient will increase functional independence with ADLs by performing:    Feeding with Modified [place].  UE Dressing with Set-up Assistance.  LE Dressing with Minimal Assistance.  Grooming while seated at sink with Modified [place].  Toileting from toilet or BSC with Minimal Assistance for hygiene and clothing management.   Bathing from  sitting at sink with Minimal Assistance.  Supine to sit with Stand-by Assistance.  Step transfer with Contact Guard Assistance with RW.  Upper extremity exercise with supervision.  Caregiver will be educated on level of assist required to safely perform self care tasks and functional transfers..     Outcome: Ongoing, Progressing

## 2022-07-15 NOTE — PT/OT/SLP PROGRESS
"Physical Therapy Treatment    Patient Name:  Patito Domingo   MRN:  [id number]  Admit Date: 7/12/2022  Admitting Diagnosis: Bacteremia  Recent Surgeries:     General Precautions: Standard, fall   Orthopedic Precautions:N/A   Braces:       Recommendations:     Discharge Recommendations:  home health PT   Level of Assistance Recommended at Discharge: 24 hours significant assistance  Discharge Equipment Recommendations: bedside commode, bath bench, grab bar, rollator, shower chair, walker, rolling, wheelchair   Barriers to discharge: Decreased caregiver support    Assessment:     Patito Domingo is a 71 y.o. female admitted with a medical diagnosis of Bacteremia . Pt tolerated fair, some encouragement for participation, pt would continue to benefit from skilled PT services to improve overall functional mobility, strength and endurance.  .      Performance deficits affecting function:  weakness, impaired endurance, impaired self care skills, impaired functional mobilty, gait instability, impaired balance, decreased lower extremity function, pain, impaired skin, impaired cardiopulmonary response to activity .    Rehab Potential is good    Activity Tolerance: Fair    Plan:     Patient to be seen 6 x/week to address the above listed problems via gait training, therapeutic activities, therapeutic exercises, neuromuscular re-education, wheelchair management/training    · Plan of Care Expires: 08/12/22  · Plan of Care Reviewed with: patient    Subjective     "I don't want to go to the gym".     Pain/Comfort:  · Pain Rating 1: 8/10  · Location - Side 1: Right  · Location - Orientation 1: generalized  · Location 1: hip  · Pain Addressed 1: Pre-medicate for activity, Reposition, Distraction, Cessation of Activity, Nurse notified (and buttock, nsg notified/assessed, cream/bandage)  · Pain Rating Post-Intervention 1: 8/10    Patient's cultural, spiritual, Pentecostal conflicts given the current situation:  · no    Objective: "     Communicated with nsg prior to session.re pt requesting meds and bandage for buttocks  Patient found  with PICC line, peripheral IV upon PT entry to room.     Therapeutic Activities and Exercises: x15 reps AP,QS,LAQ,hip flex    Functional Mobility:  · Bed Mobility:     · Supine to Sit: minimum assistance and with RLE vcs for tech, inc time HOB elev and rail  · Transfers:  EOB to WC <> BSCommode   · Sit to Stand:  minimum assistance with rolling walker and from EOB/WC/BScommode multiple trials, multiple trials from [facility] for A with post cleaning, nsg care and clothing mgmt  · Bed to Chair: minimum assistance with  rolling walker  using  Stand Pivot  · Toilet Transfer: minimum assistance with  rolling walker  using  Stand Pivot  · Gait: declined 2* to weakness and buttocks pain    AM-PAC 6 CLICK MOBILITY  16    Patient left up in chair with call button in reach and OT coming for session in room.    GOALS:   Multidisciplinary Problems     Physical Therapy Goals        Problem: Physical Therapy    Goal Priority Disciplines Outcome Goal Variances Interventions   Physical Therapy Goal     PT, PT/OT Ongoing, Progressing     Description: Goals to be met in 30 days (2022):     Patient will increase functional independence with mobility by performin. Supine to sit with MInimal Assistance.  2. Sit to supine with MInimal Assistance.  3. Rolling to Left and Right with Minimal Assistance.  4. Sit to stand transfer with Contact Guard Assistance.  5. Bed to chair transfer with Minimal Assistance using Rolling Walker  6. Gait  x 50 feet with Minimal Assistance using Rolling Walker.   7. Wheelchair propulsion x 50 feet with Stand-by Assistance using bilateral upper extremities.  8. Ascend/Descend 4 inch curb step with Moderate Assistance using Rolling Walker.  9. Lower extremity exercise program x20 reps per handout, with supervision.                     Time Tracking:     PT Received On: 07/15/22  PT Start Time:  1053  PT Stop Time: 1131  PT Total Time (min): 38 min    Billable Minutes: Therapeutic Activity 28 and Therapeutic Exercise 10    Treatment Type: Treatment  PT/PTA: PTA     PTA Visit Number: 1     07/15/2022

## 2022-07-15 NOTE — PLAN OF CARE
Interdisciplinary team, Caro Fisher, RN Nurse Manager, Paula Miller, RN Charge Nurse, Terri Wiley PT, Rehab Supervisor, and Ramona Monzon, Dietician, spoke to patient for care plan conference, weekly status update, and therapy progress update. Tentative discharge date set for 8/2/22.

## 2022-07-15 NOTE — PT/OT/SLP PROGRESS
Occupational Therapy   Treatment    Name: Patito Domingo  MRN: [id number]  Admit Date: 7/12/2022  Admitting Diagnosis:  Bacteremia    General Precautions: Standard, fall   Orthopedic Precautions:N/A   Braces:       Recommendations:     Discharge Recommendations: home health OT  Level of Assistance Recommended at Discharge: 24 hours light assistance for ADL's and homemaking tasks  Discharge Equipment Recommendations:  bedside commode, bath bench, grab bar, walker, rolling, wheelchair  Barriers to discharge:  Decreased caregiver support    Assessment:     Patito Domingo is a 71 y.o. female with a medical diagnosis of Bacteremia.  She presents with  Performance deficits affecting function are weakness, impaired endurance, impaired self care skills, impaired functional mobilty, gait instability, pain, edema, decreased lower extremity function.      Pt. With limited session on this day. Pt. With complaint of [place]  area discomfort unable to tolerate sitting up on this day after PT. Session. Pt. Placed in bed and positioned. Pt  continues to demonstrate levels of physical deficits with  functional indep with daily management activities tasks, selfcare skills with balance,  functional mobility, UB strength and endurance. Pt. Will continue to benefit from continued OT to progress towards goals     Rehab Potential is fair    Activity tolerance:  Fair    Plan:     Patient to be seen 6 x/week to address the above listed problems via self-care/home management, therapeutic activities, therapeutic exercises, neuromuscular re-education    · Plan of Care Expires: 08/13/22  · Plan of Care Reviewed with: patient    Subjective     Communicated with: nsg and Pt. prior to session. My bottom is hurting so bad I can't sit up    Pain/Comfort:  · Pain Rating 1: 9/10  · Location - Side 1: Right  · Location - Orientation 1: generalized  · Location 1: hip  · Pain Addressed 1: Pre-medicate for activity, Reposition, Distraction  · Pain  Rating Post-Intervention 1: 9/10    Patient's cultural, spiritual, Baptism conflicts given the current situation:  · no    Objective:     Patient found up in chair with  PICC line     upon OT entry to room.    Bed Mobility:    · Patient completed Sit to Supine with maximal assistance     Functional Mobility/Transfers:  · Patient completed Bed <> Chair Transfer using Squat Pivot technique with moderate assistance with no assistive device and 2nd person for safety       Activities of Daily Living:  · Not tested     [facility] 6 Click ADL: 16    Treatment & Education:    Pt edu on role of OT, POC, safety when performing self care tasks , benefit of performing OOB activity, and safety when performing functional transfers and mobility management for preparation with goals to progress towards next level of care    Patient left left sidelying with all lines intact and call button in reachEducation:      GOALS:   Multidisciplinary Problems     Occupational Therapy Goals        Problem: Occupational Therapy    Goal Priority Disciplines Outcome Interventions   Occupational Therapy Goal     OT, PT/OT Ongoing, Progressing    Description: Goals to be met by: 30 days     Patient will increase functional independence with ADLs by performing:    Feeding with Modified [place].  UE Dressing with Set-up Assistance.  LE Dressing with Minimal Assistance.  Grooming while seated at sink with Modified [place].  Toileting from toilet or BSC with Minimal Assistance for hygiene and clothing management.   Bathing from  sitting at sink with Minimal Assistance.  Supine to sit with Stand-by Assistance.  Step transfer with Contact Guard Assistance with RW.  Upper extremity exercise with supervision.  Caregiver will be educated on level of assist required to safely perform self care tasks and functional transfers..                      Time Tracking:     OT Date of Treatment: 07/15/22  OT Start Time: 1101    OT Stop Time: 1114  OT Total Time  (min): 13 min    Billable Minutes:Therapeutic Activity 13    7/15/2022  A client care conference was performed between the LOTR and RIOS, prior to treatment to discuss the patient's status, treatment plan and established goals.

## 2022-07-15 NOTE — PLAN OF CARE
Problem: Adult Inpatient Plan of Care  Goal: Plan of Care Review  Outcome: Ongoing, Progressing  Goal: Patient-Specific Goal (Individualized)  Outcome: Ongoing, Progressing  Goal: Absence of Hospital-Acquired Illness or Injury  Outcome: Ongoing, Progressing  Goal: Optimal Comfort and Wellbeing  Outcome: Ongoing, Progressing  Goal: Readiness for Transition of Care  Outcome: Ongoing, Progressing     Problem: Adjustment to Illness (Sepsis/Septic Shock)  Goal: Optimal Coping  Outcome: Ongoing, Progressing     Problem: Bleeding (Sepsis/Septic Shock)  Goal: Absence of Bleeding  Outcome: Ongoing, Progressing     Problem: Glycemic Control Impaired (Sepsis/Septic Shock)  Goal: Blood Glucose Level Within Desired Range  Outcome: Ongoing, Progressing     Problem: Infection  Goal: Absence of Infection Signs and Symptoms  Outcome: Ongoing, Progressing     Problem: Impaired Wound Healing  Goal: Optimal Wound Healing  Outcome: Ongoing, Progressing     Problem: Skin Injury Risk Increased  Goal: Skin Health and Integrity  Outcome: Ongoing, Progressing

## 2022-07-15 NOTE — PLAN OF CARE
SW met with patient in room for Discharge Planning Assessment. Pt lives alone and states s/he was previously using a rollator, Walker, or wheelchair for ambulation. Patient needed assistance with some ADLs. She will have transportation assistance at discharge from her son Matias Domingo and her son Ryan will help patient after d/c. SW is following this Pt for DC planning needs. There are no identified needs at this time.     Patient's preferred pharmacy is [facility]  Case Management Department  [facility]  [email or web address]  Initial Discharge Assessment       Primary Care Provider: Mariela Wei DO    Admission Diagnosis: Sepsis [A41.9]    Admission Date: 7/12/2022  Expected Discharge Date: 8/2/2022    Discharge Barriers Identified: None    Payor: HU[employer] MANAGED MEDICARE / Plan: HU[employer] SNP (SPECIAL NEEDS PLAN) / Product Type: Medicare Advantage /     Extended Emergency Contact Information  Primary Emergency Contact: Matias Mcbride  Mobile Phone: [phone]  Relation: Son  Secondary Emergency Contact: Dallas Domingo   [facility]  Mobile Phone: [phone]  Relation: Son    Discharge Plan A: Home, Home Health, Home with family  Discharge Plan B: Assisted Living, Home Health      [id number] [address] AT [facility] [address] [facility] [address]  [address]  [facility] [address]  Phone: [phone] Fax: [phone]    [facility] (Now [facility]) - [place], [place] - [address] [facility]  [address]  [facility] [address]  Phone: [phone] Fax: [phone]    [facility]  [address]  [place] [address]  Phone: [phone] Fax: [phone]      Initial Assessment (most recent)     Adult Discharge Assessment - 07/15/22 1024        Discharge Assessment    Assessment Type Discharge Planning  Assessment     Confirmed/corrected address, phone number and insurance Yes     Confirmed Demographics Correct on Facesheet     Source of Information patient;health record     Communicated JULIO C with patient/caregiver Yes     Lives With alone     Do you expect to return to your current living situation? Yes     Do you have help at home or someone to help you manage your care at home? Yes     Who are your caregiver(s) and their phone number(s)? pt's dede Davis [phone] and Matias Mcbride [phone]     Prior to hospitilization cognitive status: Alert/Oriented     Current cognitive status: Alert/Oriented     Walking or Climbing Stairs Difficulty ambulation difficulty, requires equipment     Dressing/Bathing Difficulty bathing difficulty, requires equipment     Equipment Currently Used at Home 3-in-1 commode;walker, rolling;rollator;wheelchair;bath bench     Readmission within 30 days? No     Patient currently being followed by outpatient case management? No     Do you currently have service(s) that help you manage your care at home? Yes     Name and Contact number of agency [facility]     Is the pt/caregiver preference to resume services with current agency Yes     Do you take prescription medications? Yes     Do you have prescription coverage? Yes     Do you have any problems affording any of your prescribed medications? No     Is the patient taking medications as prescribed? yes     Who is going to help you get home at discharge? pt's dede Davis [phone] and Matias Mcbride [phone]     How do you get to doctors appointments? family or friend will provide;health plan transportation     Are you on dialysis? No     Do you take coumadin? No     Discharge Plan A Home;Home Health;Home with family     Discharge Plan B Assisted Living;Home Health     Discharge Plan discussed with: Patient;Adult children     Discharge Barriers Identified None

## 2022-07-15 NOTE — H&P
"[facility] Medicine  Skilled Nursing Facility   History and Physical Exam      Date of Service: 07/15/2022      Patient Name: Patito Domingo  MRN: [id number]  Admission Date: 7/12/2022  Attending Physician: Benton Benson MD  Primary Care Provider: Mariela Wei DO  Code Status: Full Code      Principal problem:  Bacteremia      Chief Complaint:   Chief Complaint   Patient presents with    Blood Infection     Admitted to [facility] for rehabilitation and IV antibiotics following hospital stay for UTI.           HPI:   "Mrs. 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  Admission to SNF for secondary weakness and debility.      Patient [place] [facility] [place] with worsening generalized weakness and lethargy x 2 days. Per [facility], "She has also noticed abd pain, n/v, increased urinary frequency/urgency and fell at home last night but denies LOC. Was prescribed keflex yesterday for UTI. Pt noted to be very somnolent and minimally conversant upon arrival to ED. Workup significant for +UA, leukocytosis, lactic acidosis, FLORECITA, elevated troponin. CT abd/pelvis with no acute findings; unchanged size of carcinoid tumor with liver mets and lymphadenopathy. Started on meropenem for UTI due to recent urine culture growing ESBL klebsiella. Mental status has since improved and patient now alert and oriented x4. She was admitted with a diagnosis of sepsis 2/2 UTI, FLORECITA and non-traumatic rhabdomyolysis.  Urine and blood cultures were obtained and she was placed on IV meropenem.  Her admitting troponin was elevated and cardiology was consulted.  An echocardiogram was obtained revealing normal ejection fraction with normal wall motion.  Her troponin trended down.  She was given gentle IVF and her FLORECITA as well as rhabdomyolysis improved.  Her blood culture was positive " "for GNR, and repeat cultures were drawn.  Urine culture was positive for ESCHERICHIA COLI, sensitive to Rocephin and abx were de-escalated from meropenem to IV rocephin.  PT/OT were consulted.  Pt c/o right hip pain so an xray was obtained which was negative for fracture.  She c/o swelling to RLE.  An ultrasound of the BLE was performed which showed no evidence of DVT.  She developed intermittent low grade fevers despite abx therapy and ID was consulted.  Per ID, presence of large bilateral renal stones complicates picture, although non-obstructive.  Previously assessed by urology, but decided against stone retrieval due to risks of percutaneous nephrolithotomy, and stones not clearly causing UTIs.  ID recommendations are for IV rocephin 1gm q24h x 2 weeks, start date 7/2/22.  Plan DC to SNF with PICC or midline for continued abx treatment.  Pt accepted to [facility] and discharge there for rehab and completion of IV antibiotics."     Today, patient reports RLE pain.  Pain similar to the pain she was having at [facility].  States "muscle rub cream" helps, similar to Atul.  Also taking PO pain medication." per Shirley Canas, NP on 7/13/22.     She is complaining of right hip/buttock pain and says that the cream she is currently using is not helping. She is working well with therapy. She is eating well. She does complain of a stuffy nose     Patient admitted with skilled services with PT and OT to improve functional status and ability to perform ADLs.       Past Medical History:   Past Medical History:   Diagnosis Date    Allergy     Arthritis     Cataract     Chronic diastolic (congestive) heart failure     Colon cancer     Encounter for blood transfusion     History of ESBL E. 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)        Past Surgical History:   Past Surgical History:   Procedure Laterality Date    ABDOMINAL SURGERY      CATARACT EXTRACTION Left 10/2017     SECTION      CHOLECYSTECTOMY      COLON SURGERY      cystoscope      CYSTOSCOPY W/ RETROGRADES Right 10/10/2019    Procedure: CYSTOSCOPY, WITH RETROGRADE PYELOGRAM;  Surgeon: Gen Isbell MD;  Location: [facility] OR;  Service: Urology;  Laterality: Right;    ERCP N/A 2021    Procedure: ERCP (ENDOSCOPIC RETROGRADE CHOLANGIOPANCREATOGRAPHY);  Surgeon: Jayjay Rivera MD;  Location: [facility] ([address] [place] [address]);  Service: Endoscopy;  Laterality: N/A;    ERCP N/A 3/4/2022    Procedure: ERCP (ENDOSCOPIC RETROGRADE CHOLANGIOPANCREATOGRAPHY);  Surgeon: Roby Virgen MD;  Location: [facility] ([address] [place] [address]);  Service: Endoscopy;  Laterality: N/A;    EYE SURGERY      HYSTERECTOMY  1996    LITHOTRIPSY      LIVER BIOPSY      carcinoid    URETEROSCOPY Right 10/10/2019    Procedure: URETEROSCOPY;  Surgeon: Gen Isbell MD;  Location: [facility] OR;  Service: Urology;  Laterality: Right;    UTERINE FIBROID SURGERY         Social History:   Tobacco Use    Smoking status: Never Smoker    Smokeless tobacco: Never Used   Substance and Sexual Activity    Alcohol use: No     Alcohol/week: 0.0 standard drinks    Drug use: No    Sexual activity: Not Currently       Family History:   Family History     Problem Relation (Age of Onset)    Alzheimer's disease Father    Cancer Mother    No Known Problems Son, Son, Son, Son    Stroke Sister          No current facility-administered medications on file prior to encounter.     Current Outpatient Medications on File Prior to Encounter   Medication Sig    amLODIPine (NORVASC) 10 MG tablet Take 1 tablet (10 mg total) by mouth once daily.    ascorbic acid, vitamin C, (VITAMIN C) 1000 MG tablet Take 1 tablet (1,000 mg total) by mouth 2 (two) times daily.    baclofen  (LIORESAL) 10 MG tablet Take 1 tablet (10 mg total) by mouth 2 (two) times daily as needed (muscle cramps).    cefTRIAXone (ROCEPHIN) 1 g/50 mL PgBk IVPB Inject 50 mLs (1 g total) into the vein once daily. End date 7/16/22 for 5 days    cyanocobalamin (VITAMIN B-12) 1000 MCG tablet Take 1 tablet (1,000 mcg total) by mouth once daily.    gabapentin (NEURONTIN) 300 MG capsule Take 1 capsule (300 mg total) by mouth every evening.    LIDOcaine (LIDODERM) 5 % Place 1 patch onto the skin once daily. Remove & Discard patch within 12 hours or as directed by MD    meclizine (ANTIVERT) 25 mg tablet Take 1 tablet (25 mg total) by mouth 3 (three) times daily as needed for Dizziness.    methenamine (HIPREX) 1 gram Tab Take 1 tablet (1 g total) by mouth 2 (two) times daily.    metoprolol succinate (TOPROL-XL) 25 MG 24 hr tablet Take 1 tablet (25 mg total) by mouth 2 (two) times daily.    miconazole NITRATE 2 % (MICOTIN) 2 % top powder Apply topically 2 (two) times daily. for 10 days    oxyCODONE (ROXICODONE) 10 mg Tab immediate release tablet Take 1 tablet (10 mg total) by mouth every 8 (eight) hours as needed for Pain.    pantoprazole (PROTONIX) 40 MG tablet Take 1 tablet (40 mg total) by mouth once daily.    senna-docusate 8.6-50 mg (PERICOLACE) 8.6-50 mg per tablet Take 1 tablet by mouth 2 (two) times daily.    atorvastatin (LIPITOR) 40 MG tablet Take 1 tablet (40 mg total) by mouth every evening.    dicyclomine (BENTYL) 20 mg tablet TAKE 1 TABLET(20 MG) BY MOUTH THREE TIMES DAILY AS NEEDED FOR ABDOMINAL PAIN    magnesium oxide (MAG-OX) 400 mg (241.3 mg magnesium) tablet Take 1 tablet (400 mg total) by mouth 2 (two) times daily.    [DISCONTINUED] diclofenac sodium (VOLTAREN) 1 % Gel Apply 2 g topically 4 (four) times daily as needed (area of pain).    [DISCONTINUED] losartan (COZAAR) 100 MG tablet TAKE 1 TABLET (100 MG TOTAL) BY MOUTH ONCE DAILY.       Allergies:   Review of patient's allergies indicates:    Allergen Reactions    Contrast media Hives, Itching and Swelling    Epinephrine Anaphylaxis     Can cause  a Carcinoid Crisis    Ibuprofen Hives, Itching and Swelling    Zofran [ondansetron hcl] Itching     And multiple other reactions    Iodinated contrast media     Morphine Other (See Comments)    Sulfa (sulfonamide antibiotics) Hives, Itching and Swelling       ROS:  Review of Systems   Constitutional: Negative for appetite change, chills and fever.   HENT: Positive for rhinorrhea and sinus pressure. Negative for congestion and sore throat.    Eyes: Negative for redness and visual disturbance.   Respiratory: Negative for cough and shortness of breath.    Cardiovascular: Negative for chest pain and palpitations.   Gastrointestinal: Negative for abdominal pain, nausea and vomiting.   Genitourinary: Negative for difficulty urinating and dysuria.   Musculoskeletal: Positive for arthralgias and back pain.   Skin: Negative for pallor and rash.   Allergic/Immunologic: Negative for environmental allergies and food allergies.   Neurological: Positive for weakness. Negative for dizziness and light-headedness.   Hematological: Does not bruise/bleed easily.   Psychiatric/Behavioral: Negative for sleep disturbance. The patient is not nervous/anxious.          Objective:  Temp:  [98.5 °F (36.9 °C)-98.8 °F (37.1 °C)]   Pulse:  [91-94]   Resp:  [18]   BP: (128-136)/(58-60)   SpO2:  [97 %-98 %]     Body mass index is 29.21 kg/m².      Physical Exam  Vitals and nursing note reviewed.   Constitutional:       General: She is not in acute distress.     Appearance: She is well-developed.   HENT:      Head: Normocephalic and atraumatic.      Right Ear: External ear normal.      Left Ear: External ear normal.      Nose: No nasal deformity or mucosal edema.      Mouth/Throat:      Mouth: Mucous membranes are moist.      Pharynx: Uvula midline. No oropharyngeal exudate.   Eyes:      General: No scleral icterus.      Conjunctiva/sclera: Conjunctivae normal.      Pupils: Pupils are equal, round, and reactive to light.   Neck:      Trachea: Phonation normal.   Cardiovascular:      Rate and Rhythm: Normal rate and regular rhythm.      Heart sounds: S1 normal and S2 normal. Murmur heard.    Systolic murmur is present.     Comments: LUE PICC  Pulmonary:      Effort: Pulmonary effort is normal. No respiratory distress.      Breath sounds: Normal breath sounds. No wheezing or rales.   Chest:   Breasts:      Right: No supraclavicular adenopathy.      Left: No supraclavicular adenopathy.       Abdominal:      General: Bowel sounds are normal. There is no distension.      Palpations: Abdomen is soft.      Tenderness: There is no abdominal tenderness. There is no guarding or rebound.   Musculoskeletal:         General: No tenderness.      Cervical back: Neck supple. No muscular tenderness.      Right lower leg: No edema.      Left lower leg: No edema.   Lymphadenopathy:      Cervical:      Right cervical: No superficial cervical adenopathy.     Left cervical: No superficial cervical adenopathy.      Upper Body:      Right upper body: No supraclavicular adenopathy.      Left upper body: No supraclavicular adenopathy.   Skin:     General: Skin is warm and dry.      Findings: No bruising or rash.   Neurological:      Mental Status: She is alert and oriented to person, place, and time.      Motor: No tremor or seizure activity.   Psychiatric:         Mood and Affect: Mood normal.         Behavior: Behavior normal. Behavior is cooperative.         Thought Content: Thought content normal.         Significant Labs:   A1C:   Recent Labs   Lab 03/28/22  0553   HGBA1C 6.6*     TSH:   Recent Labs   Lab 02/02/22  0602   TSH 1.458     CBC:  Recent Labs   Lab 07/14/22  0603   WBC 4.29   HGB 9.1*   HCT 30.8*      MCV 87     BMP:  Recent Labs   Lab 07/14/22  0603         K 3.1*      CO2 23   BUN 13   CREATININE 0.8   CALCIUM 8.0*    MG 1.5*   PHOS 3.0       Significant Imaging: I have reviewed all pertinent imaging results/findings completed during prior hospitalization.      Assessment and Plan:  Active Diagnoses:    Diagnosis Date Noted POA    PRINCIPAL PROBLEM:  Bacteremia [R78.81] 07/04/2022 Yes    NSTEMI (non-ST elevated myocardial infarction) [I21.4] 07/03/2022 Yes    Malignant carcinoid tumor of midgut [C7A.095] 03/27/2022 Yes    Chronic kidney disease, stage 4 (severe) [N18.4] 03/17/2022 Yes    Chronic diastolic (congestive) heart failure [I50.32] 03/02/2022 Yes    HLD (hyperlipidemia) [E78.5] 03/08/2021 Yes    Metastatic carcinoid tumor [C7B.00] 01/27/2015 Yes     Chronic      Problems Resolved During this Admission:       Bacteremia  UTI   - presence of large bilateral renal stones complicates picture, although non-obstructive  - bilateral DVT US is negative  - repeat blood cx NGTD  - continue CTX 1g IV q 24H to complete 2 week course on 7/18/22.       RLE pain  - previous ultrasound negative for DVT  - Continue oxycodone 10 mg q.6 hours PRN  - Will try Voltaren gel to the right hip area.      NSTEMI (non-ST elevated myocardial infarction)  - Trop trending down. No EKG changes or c/o chest pain  - TTE reviewed.  - follow-up with Cardiology as outpatient     Chronic diastolic (congestive) heart failure  - No signs of decompensation  - Cont metoprolol  - TTE showing grade I diastolic dysfunction  - Monitor I&O      HLD (hyperlipidemia)  - Continue atorvastatin      Anemia of chronic disease  - continue monitor twice weekly CBCs, transfuse for hemoglobin < 7     Essential hypertension  - continue amlodipine 10 mg daily, metoprolol XL 25 mg daily.    - Home losartan on hold due to previous FLORECITA     Metastatic carcinoid tumor  - ER physician discussed the case with Dr. Perez with neuroendocrine services  - Continue follow up outpatient as scheduled. Not receiving any further treatment       Neuropathy pain  - continue  gabapentin 300 mg qHS.     Palliative care encounter  [place] [facility] [place]  - Pt elects to remain full code/full treatment status  - She does not have advance directives.  She is  and has 4 sons. If she becomes unable to make medical decisions for herself, she would want them to share surrogate decision making responsibility  - Her primary goal at this time is to regain strength to be able to go home, although she does mention that she is lonely at home.She understands that she is high-risk for readmission based on hospitalizations required over the last 6 months but feels that hospital encounters are not affecting her quality of life.  She is satisfied with home-based palliative care and would like for that to continue after SNF.   - Her goals include to regain strength and to extend life regardless of treatment burden. She confirms that she does want to continue cancer treatments.      Debility   - Continue with PT/OT for gait training and strengthening and restoration of ADL's   - Encourage mobility, OOB in chair, and early ambulation as appropriate  - Fall precautions   - Monitor for bowel and bladder dysfunction  - Monitor for and prevent skin breakdown and pressure ulcers  - Continue DVT prophylaxis with  Lovenox       Anticipated Disposition:  Home with home health      Future Appointments   Date Time Provider Department Center   9/1/2022  9:20 AM Ervin Parikh MD [facility] UROLOGY [facility]   9/21/2022 10:40 AM DO TARAH Savage [facility]       I certify that SNF services are required to be given on an inpatient basis because Patito S Dunmiles needs for skilled nursing care and/or skilled rehabilitation are required on a daily basis and such services can only practically be provided in a skilled nursing facility setting and are for an ongoing condition for which she received inpatient care in the hospital.       Benton Benson MD  Department of Hospital Medicine    [facility] - Skilled Nursing

## 2022-07-16 PROCEDURE — 25000003 PHARM REV CODE 250: Performed by: NURSE PRACTITIONER

## 2022-07-16 PROCEDURE — 97110 THERAPEUTIC EXERCISES: CPT | Mod: CQ

## 2022-07-16 PROCEDURE — 11000004 HC SNF PRIVATE

## 2022-07-16 PROCEDURE — 97116 GAIT TRAINING THERAPY: CPT | Mod: CQ

## 2022-07-16 PROCEDURE — 25000003 PHARM REV CODE 250: Performed by: HOSPITALIST

## 2022-07-16 PROCEDURE — 63600175 PHARM REV CODE 636 W HCPCS: Performed by: NURSE PRACTITIONER

## 2022-07-16 PROCEDURE — 97530 THERAPEUTIC ACTIVITIES: CPT | Mod: CQ

## 2022-07-16 RX ADMIN — ENOXAPARIN SODIUM 40 MG: 100 INJECTION SUBCUTANEOUS at 05:07

## 2022-07-16 RX ADMIN — OXYCODONE HYDROCHLORIDE 10 MG: 10 TABLET ORAL at 05:07

## 2022-07-16 RX ADMIN — DICLOFENAC SODIUM 4 G: 10 GEL TOPICAL at 09:07

## 2022-07-16 RX ADMIN — OXYCODONE HYDROCHLORIDE AND ACETAMINOPHEN 1000 MG: 500 TABLET ORAL at 10:07

## 2022-07-16 RX ADMIN — MENTHOL, METHYL SALICYLATE: 10; 15 CREAM TOPICAL at 09:07

## 2022-07-16 RX ADMIN — MENTHOL, METHYL SALICYLATE: 10; 15 CREAM TOPICAL at 02:07

## 2022-07-16 RX ADMIN — AMLODIPINE BESYLATE 10 MG: 10 TABLET ORAL at 10:07

## 2022-07-16 RX ADMIN — METOPROLOL SUCCINATE 25 MG: 25 TABLET, EXTENDED RELEASE ORAL at 10:07

## 2022-07-16 RX ADMIN — DICLOFENAC SODIUM 4 G: 10 GEL TOPICAL at 10:07

## 2022-07-16 RX ADMIN — CEFTRIAXONE 1 G: 1 INJECTION, SOLUTION INTRAVENOUS at 10:07

## 2022-07-16 RX ADMIN — BACLOFEN 10 MG: 10 TABLET ORAL at 09:07

## 2022-07-16 RX ADMIN — GABAPENTIN 300 MG: 300 CAPSULE ORAL at 09:07

## 2022-07-16 RX ADMIN — Medication 400 MG: at 09:07

## 2022-07-16 RX ADMIN — OXYCODONE HYDROCHLORIDE 10 MG: 10 TABLET ORAL at 11:07

## 2022-07-16 RX ADMIN — Medication 400 MG: at 10:07

## 2022-07-16 RX ADMIN — Medication 6 MG: at 09:07

## 2022-07-16 RX ADMIN — OXYCODONE HYDROCHLORIDE AND ACETAMINOPHEN 1000 MG: 500 TABLET ORAL at 09:07

## 2022-07-16 RX ADMIN — ATORVASTATIN CALCIUM 40 MG: 40 TABLET, FILM COATED ORAL at 05:07

## 2022-07-16 RX ADMIN — CYANOCOBALAMIN TAB 1000 MCG 1000 MCG: 1000 TAB at 10:07

## 2022-07-16 RX ADMIN — MICONAZOLE NITRATE 2 % TOPICAL POWDER: at 09:07

## 2022-07-16 RX ADMIN — PANTOPRAZOLE SODIUM 40 MG: 40 TABLET, DELAYED RELEASE ORAL at 10:07

## 2022-07-16 RX ADMIN — METHENAMINE HIPPURATE 1 G: 1 TABLET ORAL at 10:07

## 2022-07-16 RX ADMIN — METHENAMINE HIPPURATE 1 G: 1 TABLET ORAL at 09:07

## 2022-07-16 RX ADMIN — MICONAZOLE NITRATE 2 % TOPICAL POWDER: at 10:07

## 2022-07-16 RX ADMIN — SENNOSIDES AND DOCUSATE SODIUM 1 TABLET: 50; 8.6 TABLET ORAL at 09:07

## 2022-07-16 RX ADMIN — MENTHOL, METHYL SALICYLATE: 10; 15 CREAM TOPICAL at 10:07

## 2022-07-16 RX ADMIN — LIDOCAINE 1 PATCH: 50 PATCH CUTANEOUS at 05:07

## 2022-07-16 NOTE — PLAN OF CARE
Problem: Adult Inpatient Plan of Care  Goal: Plan of Care Review  Outcome: Ongoing, Progressing  Goal: Patient-Specific Goal (Individualized)  Outcome: Ongoing, Progressing  Goal: Absence of Hospital-Acquired Illness or Injury  Outcome: Ongoing, Progressing  Goal: Optimal Comfort and Wellbeing  Outcome: Ongoing, Progressing  Goal: Readiness for Transition of Care  Outcome: Ongoing, Progressing     Problem: Adjustment to Illness (Sepsis/Septic Shock)  Goal: Optimal Coping  Outcome: Ongoing, Progressing     Problem: Bleeding (Sepsis/Septic Shock)  Goal: Absence of Bleeding  Outcome: Ongoing, Progressing     Problem: Glycemic Control Impaired (Sepsis/Septic Shock)  Goal: Blood Glucose Level Within Desired Range  Outcome: Ongoing, Progressing

## 2022-07-16 NOTE — PT/OT/SLP PROGRESS
"Physical Therapy Treatment    Patient Name:  Patito Domingo   MRN:  [id number]  Admit Date: 7/12/2022  Admitting Diagnosis: Bacteremia  Recent Surgeries: N/A    General Precautions: Standard, fall   Orthopedic Precautions:N/A   Braces: N/A     Recommendations:     Discharge Recommendations:  home health PT   Level of Assistance Recommended at Discharge: 24 hours significant assistance  Discharge Equipment Recommendations: bedside commode, bath bench, grab bar, rollator, shower chair, walker, rolling, wheelchair   Barriers to discharge: Decreased caregiver support    Assessment:     Patito Domingo is a 71 y.o. female admitted with a medical diagnosis of Bacteremia .  Pt was agreeable to therapy with encouragement. Pt demonstrated fairly good bed mobility, transfers, and gait requiring increase time to complete. Pt is limited by pain. Pt is slowly progressing and continues to benefit from therapy to achieve highest level of independence piror to discharge.     Performance deficits affecting function:  weakness, impaired endurance, impaired self care skills, impaired functional mobilty, gait instability, impaired balance, decreased lower extremity function, pain, impaired skin, impaired cardiopulmonary response to activity .    Rehab Potential is good and fair    Activity Tolerance: Fair    Plan:     Patient to be seen 6 x/week to address the above listed problems via gait training, therapeutic activities, therapeutic exercises, neuromuscular re-education, wheelchair management/training    · Plan of Care Expires: 08/12/22  · Plan of Care Reviewed with: patient    Subjective     "I need some heating packs".     Pain/Comfort:  · Pain Rating 1: 9/10  · Location - Side 1: Right  · Location - Orientation 1: generalized  · Location 1: hip  · Pain Addressed 1: Reposition, Distraction  · Pain Rating Post-Intervention 1: 9/10    Patient's cultural, spiritual, Nondenominational conflicts given the current " situation:  · no    Objective:      Patient found HOB elevated with  (no active lines) upon PT entry to room.     Therapeutic Activities and Exercises:    Increased time assisting with toileting, donning/doffing briefs and completing pericare following.    Seated BLE therex 2x15 reps: heel/toe raises, LAQ, marching, and hip abd/add  Patient educated on role of therapy, goals of session, and benefits of out of bed mobility.   Instructed on use of call button and importance of calling nursing staff for assistance with mobility   Questions/concerns addressed within PTA scope of practice  Pt verbalized understanding.  Whiteboard Updated    Functional Mobility:  · Bed Mobility:     · Supine to Sit: contact guard assistance with HOB elevated   · Sit to Supine: minimum assistance with BLE management   · Transfers:     · Sit to Stand:  CGA-Maia with rolling walker  · Increase time to complete  · Verbal cues for hand placement   · Bed to Chair: contact guard assistance with  no AD  using  Stand Pivot  · Chair to Bed; contact guard assistance with RW using Step Transfer (~8ft)  · Toilet Transfer: contact guard assistance with  grab bars  using  Stand Pivot  · Gait: pt ambulated ~8ft with RW and CGA and w/c following. Occasionally unsteady gait with flexed posture, decrease step length, and increase stance time   · Limited by pain   Balance:   · Static/Dynamic sitting EOB balance: good, CGA-SBA for safety   · Completed half therex EOB,.      AM-PAC 6 CLICK MOBILITY  16    Patient left HOB elevated with call button in reach.    GOALS:   Multidisciplinary Problems     Physical Therapy Goals        Problem: Physical Therapy    Goal Priority Disciplines Outcome Goal Variances Interventions   Physical Therapy Goal     PT, PT/OT Ongoing, Progressing     Description: Goals to be met in 30 days (2022):     Patient will increase functional independence with mobility by performin. Supine to sit with MInimal  Assistance.  2. Sit to supine with MInimal Assistance.  3. Rolling to Left and Right with Minimal Assistance.  4. Sit to stand transfer with Contact Guard Assistance.  5. Bed to chair transfer with Minimal Assistance using Rolling Walker  6. Gait  x 50 feet with Minimal Assistance using Rolling Walker.   7. Wheelchair propulsion x 50 feet with Stand-by Assistance using bilateral upper extremities.  8. Ascend/Descend 4 inch curb step with Moderate Assistance using Rolling Walker.  9. Lower extremity exercise program x20 reps per handout, with supervision.                     Time Tracking:     PT Received On: 07/16/22  PT Start Time: 1116  PT Stop Time: 1210  PT Total Time (min): 54 min    Billable Minutes: Gait Training 10, Therapeutic Activity 25 and Therapeutic Exercise 19    Treatment Type: Treatment  PT/PTA: PTA     PTA Visit Number: 2     07/16/2022

## 2022-07-17 PROCEDURE — 25000003 PHARM REV CODE 250: Performed by: HOSPITALIST

## 2022-07-17 PROCEDURE — 63600175 PHARM REV CODE 636 W HCPCS: Performed by: NURSE PRACTITIONER

## 2022-07-17 PROCEDURE — 11000004 HC SNF PRIVATE

## 2022-07-17 PROCEDURE — 25000003 PHARM REV CODE 250: Performed by: NURSE PRACTITIONER

## 2022-07-17 RX ADMIN — OXYCODONE HYDROCHLORIDE 10 MG: 10 TABLET ORAL at 05:07

## 2022-07-17 RX ADMIN — OXYCODONE HYDROCHLORIDE 10 MG: 10 TABLET ORAL at 03:07

## 2022-07-17 RX ADMIN — METHENAMINE HIPPURATE 1 G: 1 TABLET ORAL at 11:07

## 2022-07-17 RX ADMIN — OXYCODONE HYDROCHLORIDE AND ACETAMINOPHEN 1000 MG: 500 TABLET ORAL at 10:07

## 2022-07-17 RX ADMIN — PANTOPRAZOLE SODIUM 40 MG: 40 TABLET, DELAYED RELEASE ORAL at 09:07

## 2022-07-17 RX ADMIN — CEFTRIAXONE 1 G: 1 INJECTION, SOLUTION INTRAVENOUS at 09:07

## 2022-07-17 RX ADMIN — OXYCODONE HYDROCHLORIDE 10 MG: 10 TABLET ORAL at 10:07

## 2022-07-17 RX ADMIN — MENTHOL, METHYL SALICYLATE: 10; 15 CREAM TOPICAL at 03:07

## 2022-07-17 RX ADMIN — AMLODIPINE BESYLATE 10 MG: 10 TABLET ORAL at 10:07

## 2022-07-17 RX ADMIN — MICONAZOLE NITRATE 2 % TOPICAL POWDER: at 09:07

## 2022-07-17 RX ADMIN — LIDOCAINE 1 PATCH: 50 PATCH CUTANEOUS at 05:07

## 2022-07-17 RX ADMIN — METHENAMINE HIPPURATE 1 G: 1 TABLET ORAL at 09:07

## 2022-07-17 RX ADMIN — DICLOFENAC SODIUM 4 G: 10 GEL TOPICAL at 09:07

## 2022-07-17 RX ADMIN — OXYCODONE HYDROCHLORIDE AND ACETAMINOPHEN 1000 MG: 500 TABLET ORAL at 09:07

## 2022-07-17 RX ADMIN — Medication 400 MG: at 10:07

## 2022-07-17 RX ADMIN — Medication 400 MG: at 09:07

## 2022-07-17 RX ADMIN — MENTHOL, METHYL SALICYLATE: 10; 15 CREAM TOPICAL at 09:07

## 2022-07-17 RX ADMIN — METOPROLOL SUCCINATE 25 MG: 25 TABLET, EXTENDED RELEASE ORAL at 10:07

## 2022-07-17 RX ADMIN — ENOXAPARIN SODIUM 40 MG: 100 INJECTION SUBCUTANEOUS at 05:07

## 2022-07-17 RX ADMIN — CYANOCOBALAMIN TAB 1000 MCG 1000 MCG: 1000 TAB at 09:07

## 2022-07-17 RX ADMIN — ATORVASTATIN CALCIUM 40 MG: 40 TABLET, FILM COATED ORAL at 06:07

## 2022-07-18 LAB
ANION GAP SERPL CALC-SCNC: 9 MMOL/L (ref 8–16)
BASOPHILS # BLD AUTO: 0.03 K/UL (ref 0–0.2)
BASOPHILS NFR BLD: 0.8 % (ref 0–1.9)
BUN SERPL-MCNC: 23 MG/DL (ref 8–23)
CALCIUM SERPL-MCNC: 8.9 MG/DL (ref 8.7–10.5)
CHLORIDE SERPL-SCNC: 109 MMOL/L (ref 95–110)
CO2 SERPL-SCNC: 23 MMOL/L (ref 23–29)
CREAT SERPL-MCNC: 0.9 MG/DL (ref 0.5–1.4)
DIFFERENTIAL METHOD: ABNORMAL
EOSINOPHIL # BLD AUTO: 0.1 K/UL (ref 0–0.5)
EOSINOPHIL NFR BLD: 4 % (ref 0–8)
ERYTHROCYTE [DISTWIDTH] IN BLOOD BY AUTOMATED COUNT: 15.7 % (ref 11.5–14.5)
EST. GFR  (AFRICAN AMERICAN): >60 ML/MIN/1.73 M^2
EST. GFR  (NON AFRICAN AMERICAN): >60 ML/MIN/1.73 M^2
GLUCOSE SERPL-MCNC: 96 MG/DL (ref 70–110)
HCT VFR BLD AUTO: 29.5 % (ref 37–48.5)
HGB BLD-MCNC: 8.5 G/DL (ref 12–16)
IMM GRANULOCYTES # BLD AUTO: 0.02 K/UL (ref 0–0.04)
IMM GRANULOCYTES NFR BLD AUTO: 0.6 % (ref 0–0.5)
LYMPHOCYTES # BLD AUTO: 1.1 K/UL (ref 1–4.8)
LYMPHOCYTES NFR BLD: 30.8 % (ref 18–48)
MAGNESIUM SERPL-MCNC: 1.8 MG/DL (ref 1.6–2.6)
MCH RBC QN AUTO: 25.9 PG (ref 27–31)
MCHC RBC AUTO-ENTMCNC: 28.8 G/DL (ref 32–36)
MCV RBC AUTO: 90 FL (ref 82–98)
MONOCYTES # BLD AUTO: 0.4 K/UL (ref 0.3–1)
MONOCYTES NFR BLD: 11 % (ref 4–15)
NEUTROPHILS # BLD AUTO: 1.9 K/UL (ref 1.8–7.7)
NEUTROPHILS NFR BLD: 52.8 % (ref 38–73)
NRBC BLD-RTO: 0 /100 WBC
PHOSPHATE SERPL-MCNC: 3 MG/DL (ref 2.7–4.5)
PLATELET # BLD AUTO: 300 K/UL (ref 150–450)
PMV BLD AUTO: 11 FL (ref 9.2–12.9)
POTASSIUM SERPL-SCNC: 3.8 MMOL/L (ref 3.5–5.1)
RBC # BLD AUTO: 3.28 M/UL (ref 4–5.4)
SODIUM SERPL-SCNC: 141 MMOL/L (ref 136–145)
WBC # BLD AUTO: 3.54 K/UL (ref 3.9–12.7)

## 2022-07-18 PROCEDURE — 80048 BASIC METABOLIC PNL TOTAL CA: CPT | Performed by: HOSPITALIST

## 2022-07-18 PROCEDURE — 25000003 PHARM REV CODE 250: Performed by: NURSE PRACTITIONER

## 2022-07-18 PROCEDURE — 83735 ASSAY OF MAGNESIUM: CPT | Performed by: HOSPITALIST

## 2022-07-18 PROCEDURE — 97116 GAIT TRAINING THERAPY: CPT | Mod: CQ

## 2022-07-18 PROCEDURE — 11000004 HC SNF PRIVATE

## 2022-07-18 PROCEDURE — 97535 SELF CARE MNGMENT TRAINING: CPT | Mod: CO

## 2022-07-18 PROCEDURE — 97110 THERAPEUTIC EXERCISES: CPT | Mod: CQ

## 2022-07-18 PROCEDURE — 63600175 PHARM REV CODE 636 W HCPCS: Performed by: NURSE PRACTITIONER

## 2022-07-18 PROCEDURE — 36415 COLL VENOUS BLD VENIPUNCTURE: CPT | Performed by: HOSPITALIST

## 2022-07-18 PROCEDURE — 25000003 PHARM REV CODE 250: Performed by: HOSPITALIST

## 2022-07-18 PROCEDURE — 85025 COMPLETE CBC W/AUTO DIFF WBC: CPT | Performed by: HOSPITALIST

## 2022-07-18 PROCEDURE — 84100 ASSAY OF PHOSPHORUS: CPT | Performed by: HOSPITALIST

## 2022-07-18 RX ORDER — OXYCODONE HYDROCHLORIDE 10 MG/1
10 TABLET ORAL EVERY 4 HOURS PRN
Status: DISCONTINUED | OUTPATIENT
Start: 2022-07-18 | End: 2022-08-01 | Stop reason: HOSPADM

## 2022-07-18 RX ADMIN — MENTHOL, METHYL SALICYLATE: 10; 15 CREAM TOPICAL at 09:07

## 2022-07-18 RX ADMIN — AMLODIPINE BESYLATE 10 MG: 10 TABLET ORAL at 09:07

## 2022-07-18 RX ADMIN — MENTHOL, METHYL SALICYLATE: 10; 15 CREAM TOPICAL at 03:07

## 2022-07-18 RX ADMIN — Medication 6 MG: at 09:07

## 2022-07-18 RX ADMIN — SENNOSIDES AND DOCUSATE SODIUM 1 TABLET: 50; 8.6 TABLET ORAL at 09:07

## 2022-07-18 RX ADMIN — DICLOFENAC SODIUM 4 G: 10 GEL TOPICAL at 09:07

## 2022-07-18 RX ADMIN — Medication 400 MG: at 09:07

## 2022-07-18 RX ADMIN — ATORVASTATIN CALCIUM 40 MG: 40 TABLET, FILM COATED ORAL at 05:07

## 2022-07-18 RX ADMIN — OXYCODONE HYDROCHLORIDE 15 MG: 10 TABLET ORAL at 09:07

## 2022-07-18 RX ADMIN — CEFTRIAXONE 1 G: 1 INJECTION, SOLUTION INTRAVENOUS at 09:07

## 2022-07-18 RX ADMIN — MICONAZOLE NITRATE 2 % TOPICAL POWDER: at 09:07

## 2022-07-18 RX ADMIN — CYANOCOBALAMIN TAB 1000 MCG 1000 MCG: 1000 TAB at 09:07

## 2022-07-18 RX ADMIN — OXYCODONE HYDROCHLORIDE AND ACETAMINOPHEN 1000 MG: 500 TABLET ORAL at 09:07

## 2022-07-18 RX ADMIN — OXYCODONE HYDROCHLORIDE 10 MG: 10 TABLET ORAL at 10:07

## 2022-07-18 RX ADMIN — OXYCODONE HYDROCHLORIDE 15 MG: 10 TABLET ORAL at 05:07

## 2022-07-18 RX ADMIN — METHENAMINE HIPPURATE 1 G: 1 TABLET ORAL at 09:07

## 2022-07-18 RX ADMIN — PANTOPRAZOLE SODIUM 40 MG: 40 TABLET, DELAYED RELEASE ORAL at 09:07

## 2022-07-18 RX ADMIN — ENOXAPARIN SODIUM 40 MG: 100 INJECTION SUBCUTANEOUS at 05:07

## 2022-07-18 RX ADMIN — METOPROLOL SUCCINATE 25 MG: 25 TABLET, EXTENDED RELEASE ORAL at 09:07

## 2022-07-18 NOTE — CONSULTS
[facility] - Skilled Nursing  Adult Nutrition  Consult Note    SUMMARY     Recommendations     1. Continue regular diet with Boost Glucose Control TID.    Goals: 1. Pt's intake meals >50% by RD follow up.  Nutrition Goal Status: goal not met  Communication of RD Recs: reviewed with physician    Assessment and Plan     Nutrition Problem  Inadequate oral intake     Related to (etiology):   Decreased appetite     Signs and Symptoms (as evidenced by):   Pt with abdominal pain, intake meals 0-25% over last week in hospital.      Interventions(treatment strategy):  Collaboration of care with providers.  General healthy diet.  Commercial beverage: Boost Glucose Control TID     Nutrition Diagnosis Status:   Continues    Malnutrition Assessment 7/13/22             Energy Intake (Malnutrition): less than or equal to 50% for greater than or equal to 5 days   Orbital Region (Subcutaneous Fat Loss): well nourished  Upper Arm Region (Subcutaneous Fat Loss): well nourished  Thoracic and Lumbar Region: well nourished   Walker Region (Muscle Loss): well nourished  Clavicle Bone Region (Muscle Loss): well nourished  Clavicle and Acromion Bone Region (Muscle Loss): well nourished  Scapular Bone Region (Muscle Loss): well nourished  Dorsal Hand (Muscle Loss): well nourished  Patellar Region (Muscle Loss): well nourished  Anterior Thigh Region (Muscle Loss): well nourished  Posterior Calf Region (Muscle Loss): well nourished   Edema (Fluid Accumulation): 0-->no edema present             Reason for Assessment    Reason For Assessment: consult  Diagnosis: infection/sepsis  Relevant Medical History: 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 Wt loss noted, unsure if current wt is likely discrepancy(34 lb decrease from last  "week). Pt nourished per NFPE 7/13. Reviewed menu with pt for preferences; communicated to .  Nutrition Discharge Planning: heart healthy diet    Nutrition Risk Screen    Nutrition Risk Screen: no indicators present    Nutrition/Diet History    Patient Reported Diet/Restrictions/Preferences: general  Typical Food/Fluid Intake: daughter makes meals  Food Preferences: pt craved junk foods during chemo treatments  Spiritual, Cultural Beliefs, Mosque Practices, Values that Affect Care: no    Anthropometrics    Temp: 98.5 °F (36.9 °C)  Height: 5' 6" (167.6 cm)  Height (inches): 66 in  Weight Method: Bed Scale  Weight: 66.8 kg (147 lb 4.3 oz)  Weight (lb): 147.27 lb  Ideal Body Weight (IBW), Female: 130 lb  % Ideal Body Weight, Female (lb): 139.23 %  BMI (Calculated): 23.8       Lab/Procedures/Meds    Pertinent Labs Reviewed: reviewed  Pertinent Labs Comments: H/H 8.5/29.5, A1c 6.6% (3/28/22)  Pertinent Medications Reviewed: reviewed  Pertinent Medications Comments: vitamin C/B12, gabapentin, magnesium oxide, protonix, senna, colace    Estimated/Assessed Needs    Weight Used For Calorie Calculations: 82.1 kg (181 lb)  Energy Calorie Requirements (kcal): 1704 kcal  Energy Need Method: [place]-St Jeor (PAL 1.25)  Protein Requirements: 82-99g  Weight Used For Protein Calculations: 82.1 kg (181 lb)        RDA Method (mL): 1704         Nutrition Prescription Ordered    Current Diet Order: Regular  Oral Nutrition Supplement: Boost Glucose Control TID    Evaluation of Received Nutrient/Fluid Intake    Comments: last BM 7/15  % Intake of Estimated Energy Needs: 25 - 50 %  % Meal Intake: 75 - 100 %    Nutrition Risk    Level of Risk/Frequency of Follow-up: high       Monitor and Evaluation    Food and Nutrient Intake: energy intake, food and beverage intake  Food and Nutrient Adminstration: diet order  Knowledge/Beliefs/Attitudes: food and nutrition knowledge/skill  Anthropometric Measurements: weight, weight " change  Biochemical Data, Medical Tests and Procedures: electrolyte and renal panel, gastrointestinal profile, glucose/endocrine profile, inflammatory profile, lipid profile  Nutrition-Focused Physical Findings: overall appearance, extremities, muscles and bones, head and eyes, skin       Nutrition Follow-Up    RD Follow-up?: Yes

## 2022-07-18 NOTE — PT/OT/SLP PROGRESS
"Physical Therapy Treatment    Patient Name:  Pattio Domingo   MRN:  [id number]  Admit Date: 7/12/2022  Admitting Diagnosis: Bacteremia  Recent Surgeries:     General Precautions: Standard, fall   Orthopedic Precautions:N/A   Braces:       Recommendations:     Discharge Recommendations:  home health PT   Level of Assistance Recommended at Discharge: 24 hours significant assistance  Discharge Equipment Recommendations: bedside commode, bath bench, grab bar, rollator, shower chair, walker, rolling, wheelchair   Barriers to discharge: Decreased caregiver support    Assessment:     Patito Domingo is a 71 y.o. female admitted with a medical diagnosis of Bacteremia . Pt tolerated well, pt would continue to benefit from skilled PT services to improve overall functional mobility, strength and endurance.  .      Performance deficits affecting function:  weakness, impaired endurance, impaired self care skills, impaired functional mobilty, gait instability, impaired balance, decreased lower extremity function, pain, impaired skin, impaired cardiopulmonary response to activity .    Rehab Potential is good    Activity Tolerance: Fair    Plan:     Patient to be seen 6 x/week to address the above listed problems via gait training, therapeutic activities, therapeutic exercises, neuromuscular re-education, wheelchair management/training    · Plan of Care Expires: 08/12/22  · Plan of Care Reviewed with: patient    Subjective       I can't, Im hurting" don't want to go to the gym, agreeable to some therapy with max encouragement. Ed on the importance/benefits of PT services    Pain/Comfort:  · Pain Rating 1: 8/10  · Location - Side 1: Right  · Location - Orientation 1: generalized  · Location 1: hip (and buttock)  · Pain Addressed 1: declined needing meds, Reposition, Distraction, Cessation of Activity, Nurse notified  · Pain Rating Post-Intervention 1:  (inc with mobility, did not rate)    Patient's cultural, spiritual, Orthodoxy " conflicts given the current situation:  · no    Objective:       Patient found with  (in WC) upon PT entry to room.     Therapeutic Activities and Exercises: 2x10 reps AP, LAQ,hip flex,abd/add mini elliptical x 6.30sec declined anymore    Functional Mobility:  · Transfers:     · Sit to Stand:  stand by assistance and contact guard assistance with rolling walker  · Bed to Chair: contact guard assistance with  rolling walker  using  Stand Pivot  · Gait: amb with RW CGA ~ 38 ft vcs for erect posture slow heidi declined additional trials    AM-PAC 6 CLICK MOBILITY  16    Patient left up in chair with call button in reach, belongings in reach, nsg notified pt requesting cream on hip post session, also added towel under R hip to off load hip and buttocks , pt reported some relief  GOALS:   Multidisciplinary Problems     Physical Therapy Goals        Problem: Physical Therapy    Goal Priority Disciplines Outcome Goal Variances Interventions   Physical Therapy Goal     PT, PT/OT Ongoing, Progressing     Description: Goals to be met in 30 days (2022):     Patient will increase functional independence with mobility by performin. Supine to sit with MInimal Assistance.  2. Sit to supine with MInimal Assistance.  3. Rolling to Left and Right with Minimal Assistance.  4. Sit to stand transfer with Contact Guard Assistance.  5. Bed to chair transfer with Minimal Assistance using Rolling Walker  6. Gait  x 50 feet with Minimal Assistance using Rolling Walker.   7. Wheelchair propulsion x 50 feet with Stand-by Assistance using bilateral upper extremities.  8. Ascend/Descend 4 inch curb step with Moderate Assistance using Rolling Walker.  9. Lower extremity exercise program x20 reps per handout, with supervision.                     Time Tracking:     PT Received On: 22  PT Start Time: 818  PT Stop Time: 841  PT Total Time (min): 23 min    Billable Minutes: Gait Training 10 and Therapeutic Exercise  13    Treatment Type: Treatment  PT/PTA: PTA     PTA Visit Number: 3     07/18/2022

## 2022-07-18 NOTE — PROGRESS NOTES
[facility]                                  Skilled Nursing Facility                   Progress Note     Admit Date: 7/12/2022  JULIO C TBD  Principal Problem:  Bacteremia   HPI obtained from patient interview and chart review     Chief Complaint: .Re-evaluation of medical treatment and therapy status: Lab review, continued pain to RLE/hip    HPI:   Mrs. 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  Admission to SNF for secondary weakness and debility.     Interval history: All of today's labs reviewed and are listed below.  Mag 1.8.  24 hr vital sign ranges listed below.  RLE pain persist.  Patient is interested in having a choice between oxycodone 10 or 15 mg PRN.  Patient denies shortness of breath, abdominal discomfort, nausea, or vomiting.  Patient reports an adequate appetite.  Patient denies dysuria.  Patient reports having regular bowel movements.  Patient is slowly progressing with PT/OT- Gait: amb with RW CGA ~ 38 ft vcs for erect posture slow heidi declined additional trials. Continuing to follow and treat all acute and chronic conditions.    Past Medical History: Patient has a past medical history of Allergy, Arthritis, Cataract, Chronic diastolic (congestive) heart failure, Colon cancer, Encounter for blood transfusion, History of ESBL E. 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    Past Surgical History: Patient has a past surgical history that includes Liver biopsy (9/14); Lithotripsy; cystoscope; Hysterectomy (5/1996); Colon surgery; Eye surgery;  Cataract extraction (Left, 10/2017); Cholecystectomy; Cystoscopy w/ retrogrades (Right, 10/10/2019); Ureteroscopy (Right, 10/10/2019);  section; Uterine fibroid surgery; Abdominal surgery; ERCP (N/A, 2021); and ERCP (N/A, 3/4/2022).    Social History: Patient reports that she has never smoked. She has never used smokeless tobacco. She reports that she does not drink alcohol and does not use drugs.    Family History: family history includes Alzheimer's disease in her father; Cancer in her mother; No Known Problems in her son, son, son, and son; Stroke in her sister.    Allergies: Patient is allergic to contrast media, epinephrine, ibuprofen, zofran [ondansetron hcl], iodinated contrast media, morphine, and sulfa (sulfonamide antibiotics).    ROS  Constitutional: Negative for fever   Eyes: Negative for blurred vision, double vision   Respiratory: Negative for cough, shortness of breath   Cardiovascular: Negative for chest pain, palpitations, and leg swelling.   Gastrointestinal: Negative for abdominal pain, constipation, diarrhea, nausea, vomiting.   Genitourinary: Negative for dysuria, frequency   Musculoskeletal:  + generalized weakness.  +RLE pain  Skin: Negative for itching and rash.   Neurological: Negative for dizziness, headaches.   Psychiatric/Behavioral: Negative for depression. The patient is not nervous/anxious.      24 hour Vital Sign Range   Temp:  [98.5 °F (36.9 °C)-99.6 °F (37.6 °C)]   Pulse:  []   Resp:  [18-19]   BP: (124-136)/(55-58)   SpO2:  [98 %]     PEx  Constitutional: Patient appears debilitated.  No distress noted  HENT:   Head: Normocephalic and atraumatic.   Eyes: Pupils are equal, round  Neck: Normal range of motion. Neck supple.   Cardiovascular: Normal rate, regular rhythm and normal heart sounds.    Pulmonary/Chest: Effort normal and breath sounds are clear  Abdominal: Soft. Bowel sounds are normal.   Musculoskeletal: Normal range of motion.   Neurological: Alert and  oriented to person, place, and time.   Psychiatric: Normal mood and affect. Behavior is normal.   Skin: Skin is warm and dry.     Altered Skin Integrity 07/07/22 1104 Right Buttocks Shearing Partial thickness tissue loss. Shallow open ulcer with a red or pink wound bed, without slough. Intact or Open/Ruptured Serum-filled blister.   Date First Assessed/Time First Assessed: 07/07/22 1104   Altered Skin Integrity Present on Admission: suspected hospital acquired  Side: Right  Location: Buttocks  Is this injury device related?: No  Primary Wound Type: Shearing  Description of Altere...   Dressing Appearance Intact;Moist drainage   Drainage Amount Scant   Drainage Characteristics/Odor Serosanguineous   Appearance Red;Moist   Tissue loss description Partial thickness   Periwound Area Intact;Other (see comments)  (resolved blistered area)   Wound Edges Open   Wound Length (cm) 6 cm   Wound Width (cm) 3 cm   Wound Depth (cm) 0.2 cm   Wound Volume (cm^3) 3.6 cm^3   Wound Surface Area (cm^2) 18 cm^2   Care Cleansed with:;Sterile normal saline;Applied:;Skin Barrier         Recent Labs   Lab 07/18/22  0523      K 3.8      CO2 23   BUN 23   CREATININE 0.9   MG 1.8       Recent Labs   Lab 07/18/22  0523   WBC 3.54*   RBC 3.28*   HGB 8.5*   HCT 29.5*      MCV 90   MCH 25.9*   MCHC 28.8*         Assessment and Plan:      RLE pain  - previous ultrasound negative for DVT  - increased oxycodone 10 mg or 15mg  q.4 hours PRN,  BenGay topical cream TID    Hypomagnesemia  - initiated magnesium oxide 400 mg BID x2 days    Bacteremia  UTI   - presence of large bilateral renal stones complicates picture, although non-obstructive  - bilateral DVT US is negative  - repeat blood cx -ngtd  - continue CTX 1g IV q 24H - will need 2 weeks from negative cx - start date is 7/2- end date is 7/18  - possible that fever is related to carcinoid      NSTEMI (non-ST elevated myocardial infarction)  - Trop trending down. No EKG changes  or c/o chest pain  - TTE reviewed.  - follow-up with Cardiology as outpatient     Chronic diastolic (congestive) heart failure  - No signs of decompensation  - Cont metoprolol  - TTE showing grade I diastolic dysfunction  - Monitor I&O      HLD (hyperlipidemia)  - Continue atorvastatin      Anemia of chronic disease  - continue monitor twice weekly CBCs, transfuse for hemoglobin < 7    Essential hypertension  - continue amlodipine 10 mg daily, metoprolol XL 25 mg daily.  Home losartan on hold due to previous FLORECITA    Metastatic carcinoid tumor  - ER physician discussed the case with Dr. Perez with neuroendocrine services  - Continue follow up outpatient as scheduled.  Not receiving any further treatment      Neuropathy pain  - continue gabapentin 30 mg qHS.    Palliative care encounter  Notes per [facility] palliative care NP  - Pt elects to remain full code/full treatment status  - She does not have advance directives.  She is  and has 4 sons. If she becomes unable to make medical decisions for herself, she would want them to share surrogate decision making responsibility  - Her primary goal at this time is to regain strength to be able to go home, although she does mention that she is lonely at home.She understands that she is high-risk for readmission based on hospitalizations required over the last 6 months but feels that hospital encounters are not affecting her quality of life.  She is satisfied with home-based palliative care and would like for that to continue after SNF.   - Her goals include to regain strength and to extend life regardless of treatment burden. She confirms that she does want to continue cancer treatments.     Debility   - Continue with PT/OT for gait training and strengthening and restoration of ADL's   - Encourage mobility, OOB in chair, and early ambulation as appropriate  - Fall precautions   - Monitor for bowel and bladder dysfunction  - Monitor for and prevent skin breakdown and  pressure ulcers  - continue DVT prophylaxis with  Lovenox      Anticipate disposition:  Home with home health      Follow-up needed during SNF stay-    Appointment to send patient to- home visit on 07/15    Follow-up needed after discharge from SNF:   - PCP, hospital follow-up  - neuroendocrine service  - Cardiology    Future Appointments   Date Time Provider Department Center   9/1/2022  9:20 AM Ervin Parikh MD [facility] UROLOGY [facility]   9/21/2022 10:40 AM DO [facility]  Department of Hospital Medicine   [facility]- Skilled Nursing [facility]     DOS: 7/18/2022       Patient note was created using MModal Dictation.  Any errors in syntax or even information may not have been identified and edited on initial review prior to signing this note.

## 2022-07-18 NOTE — PLAN OF CARE
Problem: Oral Intake Inadequate  Goal: Improved Oral Intake  Outcome: Ongoing, Progressing      1. Continue regular diet with Boost Glucose Control TID.     Goals: 1. Pt's intake meals >50% by RD follow up.  Nutrition Goal Status: goal not met  Communication of RD Recs: reviewed with physician     Assessment and Plan      Nutrition Problem  Inadequate oral intake     Related to (etiology):   Decreased appetite     Signs and Symptoms (as evidenced by):   Pt with abdominal pain, intake meals 0-25% over last week in hospital.      Interventions(treatment strategy):  Collaboration of care with providers.  General healthy diet.  Commercial beverage: Boost Glucose Control TID     Nutrition Diagnosis Status:   Continues

## 2022-07-18 NOTE — PLAN OF CARE
Problem: Adult Inpatient Plan of Care  Goal: Plan of Care Review  Outcome: Ongoing, Progressing  Goal: Patient-Specific Goal (Individualized)  Outcome: Ongoing, Progressing  Goal: Absence of Hospital-Acquired Illness or Injury  Outcome: Ongoing, Progressing  Goal: Optimal Comfort and Wellbeing  Outcome: Ongoing, Progressing  Goal: Readiness for Transition of Care  Outcome: Ongoing, Progressing     Problem: Adjustment to Illness (Sepsis/Septic Shock)  Goal: Optimal Coping  Outcome: Ongoing, Progressing     Problem: Bleeding (Sepsis/Septic Shock)  Goal: Absence of Bleeding  Outcome: Ongoing, Progressing     Problem: Glycemic Control Impaired (Sepsis/Septic Shock)  Goal: Blood Glucose Level Within Desired Range  Outcome: Ongoing, Progressing     Problem: Infection Progression (Sepsis/Septic Shock)  Goal: Absence of Infection Signs and Symptoms  Outcome: Ongoing, Progressing     Problem: Nutrition Impaired (Sepsis/Septic Shock)  Goal: Optimal Nutrition Intake  Outcome: Ongoing, Progressing     Problem: Fluid and Electrolyte Imbalance (Acute Kidney Injury/Impairment)  Goal: Fluid and Electrolyte Balance  Outcome: Ongoing, Progressing     Problem: Oral Intake Inadequate (Acute Kidney Injury/Impairment)  Goal: Optimal Nutrition Intake  Outcome: Ongoing, Progressing     Problem: Renal Function Impairment (Acute Kidney Injury/Impairment)  Goal: Effective Renal Function  Outcome: Ongoing, Progressing     Problem: Infection  Goal: Absence of Infection Signs and Symptoms  Outcome: Ongoing, Progressing     Problem: Impaired Wound Healing  Goal: Optimal Wound Healing  Outcome: Ongoing, Progressing     Problem: Skin Injury Risk Increased  Goal: Skin Health and Integrity  Outcome: Ongoing, Progressing     Problem: Oral Intake Inadequate  Goal: Improved Oral Intake  Outcome: Ongoing, Progressing     Problem: Fall Injury Risk  Goal: Absence of Fall and Fall-Related Injury  Outcome: Ongoing, Progressing

## 2022-07-18 NOTE — PT/OT/SLP PROGRESS
Occupational Therapy   Treatment    Name: Patito Domingo  MRN: [id number]  Admit Date: 7/12/2022  Admitting Diagnosis:  Bacteremia    General Precautions: Standard, fall   Orthopedic Precautions:N/A   Braces:       Recommendations:     Discharge Recommendations: home health OT  Level of Assistance Recommended at Discharge: 24 hours light assistance for ADL's and homemaking tasks  Discharge Equipment Recommendations:  bedside commode, bath bench, grab bar, walker, rolling, wheelchair  Barriers to discharge:  Decreased caregiver support    Assessment:     Patito Domingo is a 71 y.o. female with a medical diagnosis of Bacteremia She presents with  Performance deficits affecting function are are weakness, impaired endurance, impaired self care skills, impaired functional mobilty, gait instability, pain, edema, decreased lower extremity function.        .   Pt. Was cooperative and participated well with session on this day. Pt  continues to demonstrate levels of physical deficits with  functional indep with daily management activities tasks, selfcare skills with balance,  functional mobility, UB strength and endurance. Pt. Will continue to benefit from continued OT to progress towards goals     Rehab Potential is fair    Activity tolerance:  Fair    Plan:     Patient to be seen 6 x/week to address the above listed problems via self-care/home management, therapeutic activities, therapeutic exercises, neuromuscular re-education    · Plan of Care Expires: 08/13/22  · Plan of Care Reviewed with: patient    Subjective     Communicated with: Nsnadeen  prior to session.I am doing well    Pain/Comfort:  · Pain Rating 1: 6/10  · Location - Side 1: Right  · Location 1: hip  · Pain Addressed 1: Pre-medicate for activity, Reposition, Distraction  · Pain Rating Post-Intervention 1: 8/10    Patient's cultural, spiritual, Cheondoism conflicts given the current situation:  · no    Objective:     Patient found HOB elevated with   upon OT  entry to room.    Bed Mobility:    · Patient completed Rolling/Turning to Left with  minimum assistance  · Patient completed Rolling/Turning to Right with minimum assistance  · Patient completed Scooting/Bridging with moderate assistance  · Patient completed Supine to Sit with moderate assistance     Functional Mobility/Transfers:  · Patient completed Sit <> Stand Transfer with minimum assistance  with  rolling walker   · Patient completed Bed <> Chair Transfer using Stand Pivot technique with minimum assistance with no assistive device  · Patient completed Toilet Transfer Stand Pivot technique with minimum assistance with  no AD    Activities of Daily Living:  · Grooming: supervision at sink level with grooming needs  · Upper Body Dressing: minimum assistance to doff/lisa pulll over shirt  · Lower Body Dressing: maximal assistance to lisa pants EOB and to mange over hips instance with RW for bal  · Toileting: moderate assistance with cleaning and clothing mmanagement from 3n1 level    [facility] 6 Click ADL: 16      Treatment & Education:    Pt edu on role of OT, POC, safety when performing self care tasks , benefit of performing OOB activity, and safety when performing functional transfers and mobility management for preparation with goals to progress towards next level of care    Patient left up in chair with all lines intact and call button in reachEducation:      GOALS:   Multidisciplinary Problems     Occupational Therapy Goals        Problem: Occupational Therapy    Goal Priority Disciplines Outcome Interventions   Occupational Therapy Goal     OT, PT/OT Ongoing, Progressing    Description: Goals to be met by: 30 days     Patient will increase functional independence with ADLs by performing:    Feeding with Modified [place].  UE Dressing with Set-up Assistance.  LE Dressing with Minimal Assistance.  Grooming while seated at sink with Modified [place].  Toileting from toilet or BSC with Minimal Assistance  for hygiene and clothing management.   Bathing from  sitting at sink with Minimal Assistance.  Supine to sit with Stand-by Assistance.  Step transfer with Contact Guard Assistance with RW.  Upper extremity exercise with supervision.  Caregiver will be educated on level of assist required to safely perform self care tasks and functional transfers..                      Time Tracking:     OT Date of Treatment: 07/18/22  OT Start Time: 0737    OT Stop Time: 0817  OT Total Time (min): 40 min    Billable Minutes:Self Care/Home Management 40    7/18/2022

## 2022-07-19 PROCEDURE — 97110 THERAPEUTIC EXERCISES: CPT | Mod: CQ

## 2022-07-19 PROCEDURE — 11000004 HC SNF PRIVATE

## 2022-07-19 PROCEDURE — 25000003 PHARM REV CODE 250: Performed by: HOSPITALIST

## 2022-07-19 PROCEDURE — 63600175 PHARM REV CODE 636 W HCPCS: Performed by: NURSE PRACTITIONER

## 2022-07-19 PROCEDURE — 25000003 PHARM REV CODE 250: Performed by: NURSE PRACTITIONER

## 2022-07-19 PROCEDURE — 97535 SELF CARE MNGMENT TRAINING: CPT | Mod: CO

## 2022-07-19 PROCEDURE — 97530 THERAPEUTIC ACTIVITIES: CPT | Mod: CQ

## 2022-07-19 PROCEDURE — 97116 GAIT TRAINING THERAPY: CPT | Mod: CQ

## 2022-07-19 RX ADMIN — MICONAZOLE NITRATE 2 % TOPICAL POWDER: at 09:07

## 2022-07-19 RX ADMIN — CYANOCOBALAMIN TAB 1000 MCG 1000 MCG: 1000 TAB at 08:07

## 2022-07-19 RX ADMIN — METHENAMINE HIPPURATE 1 G: 1 TABLET ORAL at 09:07

## 2022-07-19 RX ADMIN — OXYCODONE HYDROCHLORIDE AND ACETAMINOPHEN 1000 MG: 500 TABLET ORAL at 09:07

## 2022-07-19 RX ADMIN — LIDOCAINE 1 PATCH: 50 PATCH CUTANEOUS at 05:07

## 2022-07-19 RX ADMIN — ATORVASTATIN CALCIUM 40 MG: 40 TABLET, FILM COATED ORAL at 05:07

## 2022-07-19 RX ADMIN — SENNOSIDES AND DOCUSATE SODIUM 1 TABLET: 50; 8.6 TABLET ORAL at 08:07

## 2022-07-19 RX ADMIN — Medication 400 MG: at 09:07

## 2022-07-19 RX ADMIN — MENTHOL, METHYL SALICYLATE: 10; 15 CREAM TOPICAL at 08:07

## 2022-07-19 RX ADMIN — DICLOFENAC SODIUM 4 G: 10 GEL TOPICAL at 09:07

## 2022-07-19 RX ADMIN — METOPROLOL SUCCINATE 25 MG: 25 TABLET, EXTENDED RELEASE ORAL at 08:07

## 2022-07-19 RX ADMIN — OXYCODONE HYDROCHLORIDE 15 MG: 10 TABLET ORAL at 09:07

## 2022-07-19 RX ADMIN — MICONAZOLE NITRATE 2 % TOPICAL POWDER: at 08:07

## 2022-07-19 RX ADMIN — PANTOPRAZOLE SODIUM 40 MG: 40 TABLET, DELAYED RELEASE ORAL at 08:07

## 2022-07-19 RX ADMIN — OXYCODONE HYDROCHLORIDE AND ACETAMINOPHEN 1000 MG: 500 TABLET ORAL at 08:07

## 2022-07-19 RX ADMIN — ENOXAPARIN SODIUM 40 MG: 100 INJECTION SUBCUTANEOUS at 05:07

## 2022-07-19 RX ADMIN — OXYCODONE HYDROCHLORIDE 15 MG: 10 TABLET ORAL at 08:07

## 2022-07-19 RX ADMIN — OXYCODONE HYDROCHLORIDE 10 MG: 10 TABLET ORAL at 01:07

## 2022-07-19 RX ADMIN — Medication 6 MG: at 09:07

## 2022-07-19 RX ADMIN — Medication 400 MG: at 08:07

## 2022-07-19 RX ADMIN — AMLODIPINE BESYLATE 10 MG: 10 TABLET ORAL at 08:07

## 2022-07-19 RX ADMIN — MENTHOL, METHYL SALICYLATE: 10; 15 CREAM TOPICAL at 09:07

## 2022-07-19 RX ADMIN — DICLOFENAC SODIUM 4 G: 10 GEL TOPICAL at 08:07

## 2022-07-19 NOTE — PT/OT/SLP PROGRESS
"Physical Therapy Treatment    Patient Name:  Patito Domingo   MRN:  [id number]  Admit Date: 7/12/2022  Admitting Diagnosis: Bacteremia  Recent Surgeries:     General Precautions: Standard, fall   Orthopedic Precautions:N/A   Braces:       Recommendations:     Discharge Recommendations:  home health PT   Level of Assistance Recommended at Discharge: 24 hours significant assistance  Discharge Equipment Recommendations: bedside commode, bath bench, grab bar, rollator, shower chair, walker, rolling, wheelchair   Barriers to discharge: Decreased caregiver support    Assessment:     Patito Domingo is a 71 y.o. female admitted with a medical diagnosis of Bacteremia . Pt tolerated well, pt continues to require encouragement for participation, however agreeable to go to gym today,  pt would continue to benefit from skilled PT services to improve overall functional mobility, strength and endurance.  .      Performance deficits affecting function:  weakness, impaired endurance, impaired self care skills, impaired functional mobilty, gait instability, impaired balance, decreased lower extremity function, pain, impaired skin, impaired cardiopulmonary response to activity .    Rehab Potential is good    Activity Tolerance: Fair    Plan:     Patient to be seen 6 x/week to address the above listed problems via gait training, therapeutic activities, therapeutic exercises, neuromuscular re-education, wheelchair management/training    · Plan of Care Expires: 08/12/22  · Plan of Care Reviewed with: patient    Subjective     "lets go" with encouragement.     Pain/Comfort:  · Pain Rating 1: 8/10  · Location - Side 1: Right  · Location - Orientation 1: generalized  · Location 1: hip (buttocks)  · Pain Addressed 1: Pre-medicate for activity, Reposition, Distraction, Cessation of Activity, Nurse notified (also ed/demo pressure relief in WC, pt able to perform)  · Pain Rating Post-Intervention 1: 8/10    Patient's cultural, spiritual, " Moravian conflicts given the current situation:  · no    Objective:       Patient found with  (in wc) upon PT entry to room.     Therapeutic Activities and Exercises: mini elliptical x 10 min 2x10 reps AP,LAQ,hip flex    Functional Mobility:  · Transfers:     · Sit to Stand:  contact guard assistance with rolling walker and vcs for reaching back for chair before sitting  · Gait: amb with RW CGA vcs for erect posture, slow heidi ~ 38 ft and 35 ft seated rest break   Stairs:  Declined curb step, home is flat     AM-PAC 6 CLICK MOBILITY  16    Patient left up in chair with call button in reach and belonging sin reach.    GOALS:   Multidisciplinary Problems     Physical Therapy Goals        Problem: Physical Therapy    Goal Priority Disciplines Outcome Goal Variances Interventions   Physical Therapy Goal     PT, PT/OT Ongoing, Progressing     Description: Goals to be met in 30 days (2022):     Patient will increase functional independence with mobility by performin. Supine to sit with MInimal Assistance.  2. Sit to supine with MInimal Assistance.  3. Rolling to Left and Right with Minimal Assistance.  4. Sit to stand transfer with Contact Guard Assistance.  5. Bed to chair transfer with Minimal Assistance using Rolling Walker  6. Gait  x 50 feet with Minimal Assistance using Rolling Walker.   7. Wheelchair propulsion x 50 feet with Stand-by Assistance using bilateral upper extremities.  8. Ascend/Descend 4 inch curb step with Moderate Assistance using Rolling Walker.  9. Lower extremity exercise program x20 reps per handout, with supervision.                     Time Tracking:     PT Received On: 22  PT Start Time: 905  PT Stop Time: 943  PT Total Time (min): 38 min    Billable Minutes: Gait Training 15, Therapeutic Activity 8 and Therapeutic Exercise 15    Treatment Type: Treatment  PT/PTA: PTA     PTA Visit Number: 4     2022

## 2022-07-19 NOTE — PT/OT/SLP PROGRESS
Occupational Therapy   Treatment    Name: Patito Domingo  MRN: [id number]  Admit Date: 7/12/2022  Admitting Diagnosis:  Bacteremia    General Precautions: Standard, fall   Orthopedic Precautions:N/A   Braces:       Recommendations:     Discharge Recommendations: home health OT  Level of Assistance Recommended at Discharge: 24 hours light assistance for ADL's and homemaking tasks  Discharge Equipment Recommendations:  bedside commode, bath bench, grab bar, walker, rolling, wheelchair  Barriers to discharge:  Decreased caregiver support    Assessment:     Patito Domingo is a 71 y.o. female with a medical diagnosis of Bacteremia She presents with  Performance deficits affecting function are are weakness, impaired endurance, impaired self care skills, impaired functional mobilty, gait instability, pain, edema, decreased lower extremity function.        .   Pt. Was cooperative and participated well with session on this day. Pt. Still continues to complaint of discomfort in buttox area with prolonged  sitting Pt  continues to demonstrate levels of physical deficits with  functional indep with daily management activities tasks, selfcare skills with balance,  functional mobility, UB strength and endurance. Pt. Will continue to benefit from continued OT to progress towards goals     Rehab Potential is fair    Activity tolerance:  Fair    Plan:     Patient to be seen 6 x/week to address the above listed problems via self-care/home management, therapeutic activities, therapeutic exercises, neuromuscular re-education    · Plan of Care Expires: 08/13/22  · Plan of Care Reviewed with: patient    Subjective     Communicated with: Mike  prior to session.I am doing well    Pain/Comfort:  Pain Rating 1: 5/10  Location - Side 1: Right  Location - Orientation 1: generalized  Location 1: hip  Pain Addressed 1: Pre-medicate for activity, Reposition  Pain Rating Post-Intervention 1: 5/10    Patient's cultural, spiritual, Hinduism  conflicts given the current situation:  no    Objective:     Patient found HOB elevated with   upon OT entry to room.    Bed Mobility:    · Patient completed Scooting/Bridging with minimum assistance  · Patient completed Supine to Sit with minimum assistance     Functional Mobility/Transfers:  · Patient completed Sit <> Stand Transfer with minimum assistance  with  rolling walker   · Patient completed Bed <> Chair Transfer using Stand Pivot technique with minimum assistance with no assistive device  · Patient completed Toilet Transfer Stand Pivot technique with minimum assistance with  no AD    Activities of Daily Living:  · Feeding:  supervision with eating breakfast  · Grooming: supervision at sink level with grooming needs  · Bathing: moderate assistance for BLE's at sink level  · Upper Body Dressing: minimum assistance to doff/lisa pulll over shirt  · Lower Body Dressing: maximal assistance to lisa pants EOB and to mange over hips instance with RW for bal  · Toileting: minimum assistance with cleaning and clothing/diaper management from 3n1 level    [facility] 6 Click ADL: 16    Treatment & Education:  Pt edu on role of OT, POC, safety when performing self care tasks , benefit of performing OOB activity, and safety when performing functional transfers and mobility management for preparation with goals to progress towards next level of care    Patient left up in chair with all lines intact and call button in reachEducation:      GOALS:   Multidisciplinary Problems     Occupational Therapy Goals        Problem: Occupational Therapy    Goal Priority Disciplines Outcome Interventions   Occupational Therapy Goal     OT, PT/OT Ongoing, Progressing    Description: Goals to be met by: 30 days     Patient will increase functional independence with ADLs by performing:    Feeding with Modified [place].  UE Dressing with Set-up Assistance.  LE Dressing with Minimal Assistance.  Grooming while seated at sink with Modified  [place].  Toileting from toilet or BSC with Minimal Assistance for hygiene and clothing management.   Bathing from  sitting at sink with Minimal Assistance.  Supine to sit with Stand-by Assistance.  Step transfer with Contact Guard Assistance with RW.  Upper extremity exercise with supervision.  Caregiver will be educated on level of assist required to safely perform self care tasks and functional transfers..                      Time Tracking:     OT Date of Treatment: 07/19/22  OT Start Time: 0818    OT Stop Time: 0900  OT Total Time (min): 42 min    Billable Minutes:Self Care/Home Management 42    7/19/2022

## 2022-07-20 PROCEDURE — 25000003 PHARM REV CODE 250: Performed by: HOSPITALIST

## 2022-07-20 PROCEDURE — 25000003 PHARM REV CODE 250: Performed by: NURSE PRACTITIONER

## 2022-07-20 PROCEDURE — 97535 SELF CARE MNGMENT TRAINING: CPT | Mod: CO

## 2022-07-20 PROCEDURE — 11000004 HC SNF PRIVATE

## 2022-07-20 PROCEDURE — 63600175 PHARM REV CODE 636 W HCPCS: Performed by: NURSE PRACTITIONER

## 2022-07-20 PROCEDURE — 97530 THERAPEUTIC ACTIVITIES: CPT | Mod: CQ

## 2022-07-20 PROCEDURE — 97116 GAIT TRAINING THERAPY: CPT | Mod: CQ

## 2022-07-20 RX ADMIN — MENTHOL, METHYL SALICYLATE: 10; 15 CREAM TOPICAL at 08:07

## 2022-07-20 RX ADMIN — LIDOCAINE 1 PATCH: 50 PATCH CUTANEOUS at 06:07

## 2022-07-20 RX ADMIN — MENTHOL, METHYL SALICYLATE: 10; 15 CREAM TOPICAL at 03:07

## 2022-07-20 RX ADMIN — OXYCODONE HYDROCHLORIDE AND ACETAMINOPHEN 1000 MG: 500 TABLET ORAL at 08:07

## 2022-07-20 RX ADMIN — Medication 400 MG: at 08:07

## 2022-07-20 RX ADMIN — MICONAZOLE NITRATE 2 % TOPICAL POWDER: at 09:07

## 2022-07-20 RX ADMIN — METHENAMINE HIPPURATE 1 G: 1 TABLET ORAL at 08:07

## 2022-07-20 RX ADMIN — PANTOPRAZOLE SODIUM 40 MG: 40 TABLET, DELAYED RELEASE ORAL at 09:07

## 2022-07-20 RX ADMIN — SENNOSIDES AND DOCUSATE SODIUM 1 TABLET: 50; 8.6 TABLET ORAL at 09:07

## 2022-07-20 RX ADMIN — CYANOCOBALAMIN TAB 1000 MCG 1000 MCG: 1000 TAB at 09:07

## 2022-07-20 RX ADMIN — ATORVASTATIN CALCIUM 40 MG: 40 TABLET, FILM COATED ORAL at 06:07

## 2022-07-20 RX ADMIN — MICONAZOLE NITRATE 2 % TOPICAL POWDER: at 08:07

## 2022-07-20 RX ADMIN — OXYCODONE HYDROCHLORIDE 15 MG: 10 TABLET ORAL at 09:07

## 2022-07-20 RX ADMIN — OXYCODONE HYDROCHLORIDE 15 MG: 10 TABLET ORAL at 05:07

## 2022-07-20 RX ADMIN — MENTHOL, METHYL SALICYLATE: 10; 15 CREAM TOPICAL at 09:07

## 2022-07-20 RX ADMIN — OXYCODONE HYDROCHLORIDE 10 MG: 10 TABLET ORAL at 06:07

## 2022-07-20 RX ADMIN — METHENAMINE HIPPURATE 1 G: 1 TABLET ORAL at 09:07

## 2022-07-20 RX ADMIN — DICLOFENAC SODIUM 4 G: 10 GEL TOPICAL at 08:07

## 2022-07-20 RX ADMIN — Medication 400 MG: at 09:07

## 2022-07-20 RX ADMIN — OXYCODONE HYDROCHLORIDE AND ACETAMINOPHEN 1000 MG: 500 TABLET ORAL at 09:07

## 2022-07-20 RX ADMIN — ENOXAPARIN SODIUM 40 MG: 100 INJECTION SUBCUTANEOUS at 05:07

## 2022-07-20 RX ADMIN — DICLOFENAC SODIUM 4 G: 10 GEL TOPICAL at 09:07

## 2022-07-20 NOTE — PT/OT/SLP PROGRESS
"Physical Therapy Treatment    Patient Name:  Patito Domingo   MRN:  [id number]  Admit Date: 7/12/2022  Admitting Diagnosis: Bacteremia    General Precautions: Standard, fall   Orthopedic Precautions:N/A   Braces: N/A     Recommendations:     Discharge Recommendations:  home health PT   Level of Assistance Recommended at Discharge: 24 hours significant assistance  Discharge Equipment Recommendations: bedside commode, bath bench, grab bar, rollator, shower chair, walker, rolling, wheelchair   Barriers to discharge: Decreased caregiver support    Assessment:     Patito Domingo is a 71 y.o. female admitted with a medical diagnosis of Bacteremia .  Pt was agreeable to therapy complete in room. Pt increased total gait distance, but required rest breaks. Pt increased flexed posture with fatigued and increase pain in legs. Pt continues to be pain limited and continues to benefit from therapy to improve functional endurance, strength, and mobility.    Performance deficits affecting function:  weakness, impaired endurance, impaired self care skills, impaired functional mobilty, gait instability, impaired balance, decreased lower extremity function, pain, impaired skin, impaired cardiopulmonary response to activity .    Rehab Potential is fair    Activity Tolerance: Fair    Plan:     Patient to be seen 6 x/week to address the above listed problems via gait training, therapeutic activities, therapeutic exercises, neuromuscular re-education, wheelchair management/training    · Plan of Care Expires: 08/12/22  · Plan of Care Reviewed with: patient    Subjective     "can we not leave the room"    Pain/Comfort:  · Pain Rating 1: 9/10  · Location - Side 1: Right  · Location - Orientation 1: generalized  · Location 1: leg  · Pain Addressed 1: Pre-medicate for activity, Reposition, Distraction  · Pain Rating Post-Intervention 1: 9/10    Patient's cultural, spiritual, Buddhism conflicts given the current " situation:  · no    Objective:     Patient found HOB elevated with  (no active lines) upon PT entry to room.     Therapeutic Activities and Exercises:    Increase spent with assisting with toileting and changing briefs    Seated BLE therex x20 reps: heel/toe raises, LAQ, and marching  o Rest breaks in-between exercises  Patient educated on role of therapy, goals of session, and benefits of out of bed mobility.   Instructed on use of call button and importance of calling nursing staff for assistance with mobility   Questions/concerns addressed within PTA scope of practice  Pt verbalized understanding.  Whiteboard Updated      Functional Mobility:  · Bed Mobility:     · Scooting: minimum assistance to EOB  · Supine to Sit: minimum assistance with HOB elevated   · Transfers:     · Sit to Stand:  contact guard assistance with rolling walker  · Verbal cues for hand placement   · Toilet Transfer: contact guard assistance with  rolling walker  using  Step Transfer (~12ft)  · Gait: pt ambulated ~12ft (x2) + 28ft + 26ft with RW and CGA-Maia. Occasional steady gait with flexed posture (increased with fatigued), decrease step length, and decrease heidi  · Verbal cues for sequencing, and Rw management and upright posture  · No LOB noted.     AM-PAC 6 CLICK MOBILITY  16    Patient left up in wheelchair  with call button in reach.    GOALS:   Multidisciplinary Problems     Physical Therapy Goals        Problem: Physical Therapy    Goal Priority Disciplines Outcome Goal Variances Interventions   Physical Therapy Goal     PT, PT/OT Ongoing, Progressing     Description: Goals to be met in 30 days (2022):     Patient will increase functional independence with mobility by performin. Supine to sit with MInimal Assistance.  2. Sit to supine with MInimal Assistance.  3. Rolling to Left and Right with Minimal Assistance.  4. Sit to stand transfer with Contact Guard Assistance.  5. Bed to chair transfer with Minimal  Assistance using Rolling Walker  6. Gait  x 50 feet with Minimal Assistance using Rolling Walker.   7. Wheelchair propulsion x 50 feet with Stand-by Assistance using bilateral upper extremities.  8. Ascend/Descend 4 inch curb step with Moderate Assistance using Rolling Walker.  9. Lower extremity exercise program x20 reps per handout, with supervision.                     Time Tracking:     PT Received On: 07/20/22  PT Start Time: 1127  PT Stop Time: 1155  PT Total Time (min): 28 min    Billable Minutes: Gait Training 15 and Therapeutic Activity 13    Treatment Type: Treatment  PT/PTA: PTA     PTA Visit Number: 5     07/20/2022

## 2022-07-20 NOTE — PT/OT/SLP PROGRESS
Occupational Therapy   Treatment    Name: Patito Domingo  MRN: [id number]  Admit Date: 7/12/2022  Admitting Diagnosis:  Bacteremia    General Precautions: Standard, fall   Orthopedic Precautions:N/A   Braces:       Recommendations:     Discharge Recommendations: home health OT  Level of Assistance Recommended at Discharge: 24 hours light assistance for ADL's and homemaking tasks  Discharge Equipment Recommendations:  bedside commode, bath bench, grab bar, walker, rolling, wheelchair  Barriers to discharge:  Decreased caregiver support    Assessment:     Patito Domingo is a 71 y.o. female with a medical diagnosis of Bacteremia She presents with  Performance deficits affecting function are weakness, impaired endurance, impaired self care skills, impaired functional mobility, gait instability, pain, edema, decreased lower extremity function.        .   Pt. Was cooperative and participated well with session on this day. Pt. Still continues to complaint of discomfort in [place]tox area with min to moderate sitting Pt  continues to demonstrate levels of physical deficits with  functional indep with daily management activities tasks, selfcare skills with balance,  functional mobility, UB strength and endurance. Pt. Will continue to benefit from continued OT to progress towards goals     Rehab Potential is fair    Activity tolerance:  Fair    Plan:     Patient to be seen 6 x/week to address the above listed problems via self-care/home management, therapeutic activities, therapeutic exercises, neuromuscular re-education    · Plan of Care Expires: 08/13/22  · Plan of Care Reviewed with: patient    Subjective     Communicated with: Mike  prior to session.I can't get comfortable      Pain/Comfort:  Pain Rating 1: 6/10  Location - Side 1: Right  Location 1: hip (buttox)  Pain Addressed 1: Pre-medicate for activity, Reposition, Distraction  Pain Rating Post-Intervention 1: 6/10    Patient's cultural, spiritual, Advent conflicts  given the current situation:  no    Objective:     Patient found up in chair with   upon OT entry to room.    Functional Mobility/Transfers:  · Patient completed Sit <> Stand Transfer with minimum assistance  with  no assistive device   · Patient completed Toilet Transfer Stand Pivot technique with minimum assistance with  grab bars    Activities of Daily Living:  · Grooming: supervision at sink level with grooming needs  · Upper Body Dressing: minimum assistance to doff/lisa pulll over shirt  · Lower Body Dressing: maximal assistance to lisa pants w/c level and to mange over hips instance with RW for bal  · Toileting: minimum assistance with cleaning and clothing/diaper management from 3n1 level    [facility] 6 Click ADL: 16    Treatment & Education:  Pt edu on role of OT, POC, safety when performing self care tasks , benefit of performing OOB activity, and safety when performing functional transfers and mobility management for preparation with goals to progress towards next level of care    Patient left up in chair with all lines intact and call button in reachEducation:      GOALS:   Multidisciplinary Problems     Occupational Therapy Goals        Problem: Occupational Therapy    Goal Priority Disciplines Outcome Interventions   Occupational Therapy Goal     OT, PT/OT Ongoing, Progressing    Description: Goals to be met by: 30 days     Patient will increase functional independence with ADLs by performing:    Feeding with Modified [place].  UE Dressing with Set-up Assistance.  LE Dressing with Minimal Assistance.  Grooming while seated at sink with Modified [place].  Toileting from toilet or BSC with Minimal Assistance for hygiene and clothing management.   Bathing from  sitting at sink with Minimal Assistance.  Supine to sit with Stand-by Assistance.  Step transfer with Contact Guard Assistance with RW.  Upper extremity exercise with supervision.  Caregiver will be educated on level of assist required to  safely perform self care tasks and functional transfers..                      Time Tracking:     OT Date of Treatment: 07/20/22  OT Start Time: 1301    OT Stop Time: 1339  OT Total Time (min): 38 min    Billable Minutes:Self Care/Home Management 38    7/20/2022

## 2022-07-21 LAB
ANION GAP SERPL CALC-SCNC: 8 MMOL/L (ref 8–16)
BACTERIA #/AREA URNS AUTO: ABNORMAL /HPF
BASOPHILS # BLD AUTO: 0.03 K/UL (ref 0–0.2)
BASOPHILS NFR BLD: 0.8 % (ref 0–1.9)
BILIRUB UR QL STRIP: NEGATIVE
BUN SERPL-MCNC: 18 MG/DL (ref 8–23)
CALCIUM SERPL-MCNC: 9.1 MG/DL (ref 8.7–10.5)
CHLORIDE SERPL-SCNC: 110 MMOL/L (ref 95–110)
CLARITY UR REFRACT.AUTO: ABNORMAL
CO2 SERPL-SCNC: 24 MMOL/L (ref 23–29)
COLOR UR AUTO: YELLOW
CREAT SERPL-MCNC: 0.8 MG/DL (ref 0.5–1.4)
DIFFERENTIAL METHOD: ABNORMAL
EOSINOPHIL # BLD AUTO: 0.1 K/UL (ref 0–0.5)
EOSINOPHIL NFR BLD: 3.8 % (ref 0–8)
ERYTHROCYTE [DISTWIDTH] IN BLOOD BY AUTOMATED COUNT: 15.6 % (ref 11.5–14.5)
EST. GFR  (AFRICAN AMERICAN): >60 ML/MIN/1.73 M^2
EST. GFR  (NON AFRICAN AMERICAN): >60 ML/MIN/1.73 M^2
GLUCOSE SERPL-MCNC: 93 MG/DL (ref 70–110)
GLUCOSE UR QL STRIP: NEGATIVE
HCT VFR BLD AUTO: 29.4 % (ref 37–48.5)
HGB BLD-MCNC: 8.7 G/DL (ref 12–16)
HGB UR QL STRIP: NEGATIVE
HYALINE CASTS UR QL AUTO: 2 /LPF
IMM GRANULOCYTES # BLD AUTO: 0.02 K/UL (ref 0–0.04)
IMM GRANULOCYTES NFR BLD AUTO: 0.5 % (ref 0–0.5)
KETONES UR QL STRIP: NEGATIVE
LEUKOCYTE ESTERASE UR QL STRIP: ABNORMAL
LYMPHOCYTES # BLD AUTO: 1.3 K/UL (ref 1–4.8)
LYMPHOCYTES NFR BLD: 35.7 % (ref 18–48)
MAGNESIUM SERPL-MCNC: 1.8 MG/DL (ref 1.6–2.6)
MCH RBC QN AUTO: 26 PG (ref 27–31)
MCHC RBC AUTO-ENTMCNC: 29.6 G/DL (ref 32–36)
MCV RBC AUTO: 88 FL (ref 82–98)
MICROSCOPIC COMMENT: ABNORMAL
MONOCYTES # BLD AUTO: 0.5 K/UL (ref 0.3–1)
MONOCYTES NFR BLD: 13.2 % (ref 4–15)
NEUTROPHILS # BLD AUTO: 1.7 K/UL (ref 1.8–7.7)
NEUTROPHILS NFR BLD: 46 % (ref 38–73)
NITRITE UR QL STRIP: NEGATIVE
NRBC BLD-RTO: 0 /100 WBC
PH UR STRIP: 5 [PH] (ref 5–8)
PHOSPHATE SERPL-MCNC: 3.2 MG/DL (ref 2.7–4.5)
PLATELET # BLD AUTO: 312 K/UL (ref 150–450)
PMV BLD AUTO: 10.6 FL (ref 9.2–12.9)
POTASSIUM SERPL-SCNC: 4.2 MMOL/L (ref 3.5–5.1)
PROT UR QL STRIP: NEGATIVE
RBC # BLD AUTO: 3.35 M/UL (ref 4–5.4)
RBC #/AREA URNS AUTO: 9 /HPF (ref 0–4)
SODIUM SERPL-SCNC: 142 MMOL/L (ref 136–145)
SP GR UR STRIP: 1.01 (ref 1–1.03)
SQUAMOUS #/AREA URNS AUTO: 7 /HPF
URN SPEC COLLECT METH UR: ABNORMAL
WBC # BLD AUTO: 3.7 K/UL (ref 3.9–12.7)
WBC #/AREA URNS AUTO: 67 /HPF (ref 0–5)

## 2022-07-21 PROCEDURE — 97110 THERAPEUTIC EXERCISES: CPT

## 2022-07-21 PROCEDURE — 63600175 PHARM REV CODE 636 W HCPCS: Performed by: NURSE PRACTITIONER

## 2022-07-21 PROCEDURE — 84100 ASSAY OF PHOSPHORUS: CPT | Performed by: HOSPITALIST

## 2022-07-21 PROCEDURE — 97530 THERAPEUTIC ACTIVITIES: CPT

## 2022-07-21 PROCEDURE — 36415 COLL VENOUS BLD VENIPUNCTURE: CPT | Performed by: HOSPITALIST

## 2022-07-21 PROCEDURE — 81001 URINALYSIS AUTO W/SCOPE: CPT | Performed by: HOSPITALIST

## 2022-07-21 PROCEDURE — 25000003 PHARM REV CODE 250: Performed by: NURSE PRACTITIONER

## 2022-07-21 PROCEDURE — 97535 SELF CARE MNGMENT TRAINING: CPT

## 2022-07-21 PROCEDURE — 80048 BASIC METABOLIC PNL TOTAL CA: CPT | Performed by: HOSPITALIST

## 2022-07-21 PROCEDURE — 97116 GAIT TRAINING THERAPY: CPT

## 2022-07-21 PROCEDURE — 85025 COMPLETE CBC W/AUTO DIFF WBC: CPT | Performed by: HOSPITALIST

## 2022-07-21 PROCEDURE — 83735 ASSAY OF MAGNESIUM: CPT | Performed by: HOSPITALIST

## 2022-07-21 PROCEDURE — 11000004 HC SNF PRIVATE

## 2022-07-21 PROCEDURE — 25000003 PHARM REV CODE 250: Performed by: HOSPITALIST

## 2022-07-21 PROCEDURE — 87086 URINE CULTURE/COLONY COUNT: CPT | Performed by: HOSPITALIST

## 2022-07-21 RX ORDER — DOXYLAMINE SUCCINATE 25 MG
TABLET ORAL 2 TIMES DAILY
Status: DISCONTINUED | OUTPATIENT
Start: 2022-07-21 | End: 2022-08-01 | Stop reason: HOSPADM

## 2022-07-21 RX ADMIN — MICONAZOLE NITRATE: 20 CREAM TOPICAL at 09:07

## 2022-07-21 RX ADMIN — ENOXAPARIN SODIUM 40 MG: 100 INJECTION SUBCUTANEOUS at 04:07

## 2022-07-21 RX ADMIN — DICLOFENAC SODIUM 4 G: 10 GEL TOPICAL at 10:07

## 2022-07-21 RX ADMIN — Medication 400 MG: at 10:07

## 2022-07-21 RX ADMIN — DICLOFENAC SODIUM 4 G: 10 GEL TOPICAL at 09:07

## 2022-07-21 RX ADMIN — OXYCODONE HYDROCHLORIDE AND ACETAMINOPHEN 1000 MG: 500 TABLET ORAL at 09:07

## 2022-07-21 RX ADMIN — MENTHOL, METHYL SALICYLATE: 10; 15 CREAM TOPICAL at 09:07

## 2022-07-21 RX ADMIN — MENTHOL, METHYL SALICYLATE: 10; 15 CREAM TOPICAL at 04:07

## 2022-07-21 RX ADMIN — MICONAZOLE NITRATE 2 % TOPICAL POWDER: at 10:07

## 2022-07-21 RX ADMIN — METHENAMINE HIPPURATE 1 G: 1 TABLET ORAL at 10:07

## 2022-07-21 RX ADMIN — LIDOCAINE 1 PATCH: 50 PATCH CUTANEOUS at 04:07

## 2022-07-21 RX ADMIN — AMLODIPINE BESYLATE 10 MG: 10 TABLET ORAL at 10:07

## 2022-07-21 RX ADMIN — OXYCODONE HYDROCHLORIDE 15 MG: 10 TABLET ORAL at 04:07

## 2022-07-21 RX ADMIN — METHENAMINE HIPPURATE 1 G: 1 TABLET ORAL at 09:07

## 2022-07-21 RX ADMIN — METOPROLOL SUCCINATE 25 MG: 25 TABLET, EXTENDED RELEASE ORAL at 10:07

## 2022-07-21 RX ADMIN — CYANOCOBALAMIN TAB 1000 MCG 1000 MCG: 1000 TAB at 10:07

## 2022-07-21 RX ADMIN — Medication 400 MG: at 09:07

## 2022-07-21 RX ADMIN — OXYCODONE HYDROCHLORIDE 15 MG: 10 TABLET ORAL at 10:07

## 2022-07-21 RX ADMIN — PANTOPRAZOLE SODIUM 40 MG: 40 TABLET, DELAYED RELEASE ORAL at 10:07

## 2022-07-21 RX ADMIN — MENTHOL, METHYL SALICYLATE: 10; 15 CREAM TOPICAL at 10:07

## 2022-07-21 RX ADMIN — ATORVASTATIN CALCIUM 40 MG: 40 TABLET, FILM COATED ORAL at 06:07

## 2022-07-21 RX ADMIN — SENNOSIDES AND DOCUSATE SODIUM 1 TABLET: 50; 8.6 TABLET ORAL at 10:07

## 2022-07-21 RX ADMIN — OXYCODONE HYDROCHLORIDE 10 MG: 10 TABLET ORAL at 10:07

## 2022-07-21 RX ADMIN — OXYCODONE HYDROCHLORIDE 15 MG: 10 TABLET ORAL at 03:07

## 2022-07-21 RX ADMIN — OXYCODONE HYDROCHLORIDE 15 MG: 10 TABLET ORAL at 09:07

## 2022-07-21 RX ADMIN — OXYCODONE HYDROCHLORIDE AND ACETAMINOPHEN 1000 MG: 500 TABLET ORAL at 10:07

## 2022-07-21 NOTE — PLAN OF CARE
Problem: Adult Inpatient Plan of Care  Goal: Plan of Care Review  Outcome: Ongoing, Progressing  Goal: Patient-Specific Goal (Individualized)  Outcome: Ongoing, Progressing  Goal: Optimal Comfort and Wellbeing  Outcome: Ongoing, Progressing  Goal: Readiness for Transition of Care  Outcome: Ongoing, Progressing     Problem: Adjustment to Illness (Sepsis/Septic Shock)  Goal: Optimal Coping  Outcome: Ongoing, Progressing     Problem: Bleeding (Sepsis/Septic Shock)  Goal: Absence of Bleeding  Outcome: Ongoing, Progressing     Problem: Glycemic Control Impaired (Sepsis/Septic Shock)  Goal: Blood Glucose Level Within Desired Range  Outcome: Ongoing, Progressing   Pt resting peaceful throughout night. PRN oxycodone given to treat bilateral hip pain. ML to left upper are CDI. No falls or injury. Care plan updated.

## 2022-07-21 NOTE — PLAN OF CARE
Problem: Physical Therapy  Goal: Physical Therapy Goal  Description: Goals to be met in 30 days (2022):     Patient will increase functional independence with mobility by performin. Supine to sit with MInimal Assistance.  2. Sit to supine with MInimal Assistance.  3. Rolling to Left and Right with Minimal Assistance.  4. Sit to stand transfer with Contact Guard Assistance.  5. Bed to chair transfer with Minimal Assistance using Rolling Walker -met  Updated 2022: Bed to chair transfer with CGA using Rolling Walker.    6. Gait x 50 feet with Minimal Assistance using Rolling Walker.   7. Wheelchair propulsion x 50 feet with Stand-by Assistance using bilateral upper extremities.  8. Ascend/Descend 4 inch curb step with Moderate Assistance using Rolling Walker.    9. Lower extremity exercise program x20 reps per handout, with supervision. -met  Updated 2022: Lower extremity exercise program x30 reps per handout, with supervision.    Outcome: Ongoing, Progressing

## 2022-07-21 NOTE — PT/OT/SLP PROGRESS
Physical Therapy Treatment    Patient Name:  Patito Domingo   MRN:  [id number]  Admit Date: 7/12/2022  Admitting Diagnosis: Bacteremia  Recent Surgeries: N/A    General Precautions: Standard, fall   Orthopedic Precautions:N/A   Braces: N/A     PT had a face-to-face encounter with regards to the patient's plan of care with the PTA who has been seeing this patient regularly.    Recommendations:     Discharge Recommendations:  home health PT   Level of Assistance Recommended at Discharge: 24 hours light assistance  Discharge Equipment Recommendations: bedside commode, bath bench, grab bar, rollator, shower chair, walker, rolling, wheelchair   Barriers to discharge: Decreased caregiver support (increased assistance needed)    Assessment:     Patito Domingo is a 71 y.o. female admitted with a medical diagnosis of Bacteremia. Patient tolerated today's session well, continuing to ambulating a further distance today. She required frequent vc to attain erect standing posture when ambulating only able to stand erect for 10s, indicating a decrease in paraspinal musculature endurance. She remains limited in functional mobility, lower extremity strength, cardiovascular endurance, and dynamic standing balance. Patient continues to require skilled physical therapy services to address deficits and reduce burden of care when discharged.     Performance deficits affecting function: weakness, impaired endurance, impaired self care skills, impaired functional mobility, gait instability, impaired balance, pain, impaired skin, impaired cardiopulmonary response to activity .    Rehab Potential is good    Activity Tolerance: Good    Plan:     Patient to be seen 6 x/week to address the above listed problems via gait training, therapeutic activities, therapeutic exercises, neuromuscular re-education, wheelchair management/training    · Plan of Care Expires: 08/02/22  · Plan of Care Reviewed with: patient    Subjective     Patient reports  pain on her buttock being 9/10 but is agreeable to participate in therapy today.    Pain/Comfort:  Pain Rating 1: 9/10  Location - Side 1: Bilateral  Location - Orientation 1: generalized  Location 1: sacral spine  Pain Addressed 1: Reposition, Distraction, Cessation of Activity  Pain Rating Post-Intervention 1: 9/10    Patient's cultural, spiritual, Taoism conflicts given the current situation:  no    Objective:     Communicated with nursing staff prior to session. Patient found up in chair with PICC line upon PT entry to room.     Functional Mobility:  Bed Mobility:     · Scooting: minimum assistance  · Bridging: minimum assistance  · Supine to Sit: moderate assistance  · Sit to Supine: moderate assistance    Transfers:     · Sit to Stand x multiple trials: minimum assistance with rolling walker  · Wc to bed: minimum assistance with rolling walker using Stand Pivot    Gait: Patient ambulated x 2 trials of 38 feet with RW and CGA. She demonstrated decreased heidi, decreased step length, excessive trunk flexion, and reciprocal gait pattern 60% of the time (step to gait pattern otherwise). She required frequent vc to attain erect standing posture when ambulating only able to stand erect for 10s, indicating a decrease in paraspinal musculature endurance.    Wheelchair Propulsion: Pt propelled Standard wheelchair x 30 feet on Level tile with Bilateral upper extremity with Stand-by Assistance.     Therapeutic Activities and Exercises:   Seated exercises: ankle pumps > LAQ > glute squeezes > hip flexion > hip abd/add x 20 reps.      AM-PAC 6 CLICK MOBILITY  16    Patient left supine with call button in reach and nurse notified PICC was itching patients arm.    GOALS:   Multidisciplinary Problems     Physical Therapy Goals        Problem: Physical Therapy    Goal Priority Disciplines Outcome Goal Variances Interventions   Physical Therapy Goal     PT, PT/OT Ongoing, Progressing     Description: Goals to be met in 30  days (2022):     Patient will increase functional independence with mobility by performin. Supine to sit with MInimal Assistance.  2. Sit to supine with MInimal Assistance.  3. Rolling to Left and Right with Minimal Assistance.  4. Sit to stand transfer with Contact Guard Assistance.  5. Bed to chair transfer with Minimal Assistance using Rolling Walker -met  Updated 2022: Bed to chair transfer with CGA using Rolling Walker.    6. Gait x 50 feet with Minimal Assistance using Rolling Walker.   7. Wheelchair propulsion x 50 feet with Stand-by Assistance using bilateral upper extremities.  8. Ascend/Descend 4 inch curb step with Moderate Assistance using Rolling Walker.    9. Lower extremity exercise program x20 reps per handout, with supervision. -met  Updated 2022: Lower extremity exercise program x30 reps per handout, with supervision.                     Time Tracking:     PT Received On: 22  PT Start Time: 1045  PT Stop Time: 1112  PT Total Time (min): 27 min    Billable Minutes: Gait Training 10 and Therapeutic Exercise 17    Treatment Type: Treatment, 6th Visit  PT/PTA: PT     PTA Visit Number: 0     2022

## 2022-07-21 NOTE — PLAN OF CARE
Problem: Infection  Goal: Absence of Infection Signs and Symptoms  Outcome: Ongoing, Progressing     Problem: Impaired Wound Healing  Goal: Optimal Wound Healing  Outcome: Ongoing, Progressing     Problem: Skin Injury Risk Increased  Goal: Skin Health and Integrity  Outcome: Ongoing, Progressing     Problem: Oral Intake Inadequate  Goal: Improved Oral Intake  Outcome: Ongoing, Progressing

## 2022-07-21 NOTE — PROGRESS NOTES
[facility]                                  Skilled Nursing Facility                   Progress Note     Admit Date: 7/12/2022  JULIO C TBD  Principal Problem:  Bacteremia   HPI obtained from patient interview and chart review     Chief Complaint: .Re-evaluation of medical treatment and therapy status: Lab review, dysuria    HPI:   Mrs. 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  Admission to SNF for secondary weakness and debility.     Interval history: All of today's labs reviewed and are listed below.  24 hr vital sign ranges listed below.  Patient denies shortness of breath, abdominal discomfort, nausea, or vomiting.  Patient reports an inadequate appetite.  Patient endorses dysuria.  Patient reports having regular bowel movements.  Patient progessing with PT/OT- Gait: Patient ambulated x 2 trials of 38 feet with RW and CGA. She demonstrated decreased heidi, decreased step length, excessive trunk flexion, and reciprocal gait pattern 60% of the time (step to gait pattern otherwise). She required frequent vc to attain erect standing posture when ambulating only able to stand erect for 10s, indicating a decrease in paraspinal musculature endurance. Continuing to follow and treat all acute and chronic conditions.    Past Medical History: Patient has a past medical history of Allergy, Arthritis, Cataract, Chronic diastolic (congestive) heart failure, Colon cancer, Encounter for blood transfusion, History of ESBL E. 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    Past  Surgical History: Patient has a past surgical history that includes Liver biopsy (); Lithotripsy; cystoscope; Hysterectomy (1996); Colon surgery; Eye surgery; Cataract extraction (Left, 10/2017); Cholecystectomy; Cystoscopy w/ retrogrades (Right, 10/10/2019); Ureteroscopy (Right, 10/10/2019);  section; Uterine fibroid surgery; Abdominal surgery; ERCP (N/A, 2021); and ERCP (N/A, 3/4/2022).    Social History: Patient reports that she has never smoked. She has never used smokeless tobacco. She reports that she does not drink alcohol and does not use drugs.    Family History: family history includes Alzheimer's disease in her father; Cancer in her mother; No Known Problems in her son, son, son, and son; Stroke in her sister.    Allergies: Patient is allergic to contrast media, epinephrine, ibuprofen, zofran [ondansetron hcl], iodinated contrast media, morphine, and sulfa (sulfonamide antibiotics).    ROS  Constitutional: Negative for fever   Eyes: Negative for blurred vision, double vision   Respiratory: Negative for cough, shortness of breath   Cardiovascular: Negative for chest pain, palpitations, and leg swelling.   Gastrointestinal: Negative for abdominal pain, constipation, diarrhea, nausea, vomiting.   Genitourinary: + for dysuria, frequency   Musculoskeletal:  + generalized weakness.  +RLE pain  Skin: Negative for itching and rash.   Neurological: Negative for dizziness, headaches.   Psychiatric/Behavioral: Negative for depression. The patient is not nervous/anxious.      24 hour Vital Sign Range   Temp:  [97.8 °F (36.6 °C)-98.6 °F (37 °C)]   Pulse:  [86-90]   Resp:  [16-19]   BP: (129-138)/(60-62)   SpO2:  [95 %-98 %]     PEx  Constitutional: Patient appears debilitated.  No distress noted  HENT:   Head: Normocephalic and atraumatic.   Eyes: Pupils are equal, round  Neck: Normal range of motion. Neck supple.   Cardiovascular: Normal rate, regular rhythm and normal heart sounds.     Pulmonary/Chest: Effort normal and breath sounds are clear  Abdominal: Soft. Bowel sounds are normal.   Musculoskeletal: Normal range of motion.   Neurological: Alert and oriented to person, place, and time.   Psychiatric: Normal mood and affect. Behavior is normal.   Skin: Skin is warm and dry.      Altered Skin Integrity 07/07/22 1104 Right Buttocks Shearing Partial thickness tissue loss. Shallow open ulcer with a red or pink wound bed, without slough. Intact or Open/Ruptured Serum-filled blister.   Date First Assessed/Time First Assessed: 07/07/22 1104   Altered Skin Integrity Present on Admission: suspected hospital acquired  Side: Right  Location: Buttocks  Is this injury device related?: No  Primary Wound Type: Shearing  Description of Altere...   Dressing Appearance Intact;Moist drainage   Drainage Amount Scant   Drainage Characteristics/Odor Serosanguineous;Creamy   Appearance Red;Tan;Slough;Moist   Tissue loss description Partial thickness   Periwound Area Intact;Moist   Wound Edges Open;Jagged;Irregular   Wound Length (cm) 5 cm   Wound Width (cm) 5 cm   Wound Depth (cm) 0.2 cm   Wound Volume (cm^3) 5 cm^3   Wound Surface Area (cm^2) 25 cm^2   Care Cleansed with:;Sterile normal saline;Applied:;Skin Barrier   Dressing Removed;Island/border;Changed   Skin Interventions   Device Skin Pressure Protection absorbent pad utilized/changed;positioning supports utilized;pressure points protected   Pressure Reduction Devices positioning supports utilized;pressure-redistributing mattress utilized   Pressure Reduction Techniques frequent weight shift encouraged   Skin Protection incontinence pads utilized;skin sealant/moisture barrier applied         Recent Labs   Lab 07/21/22  0544      K 4.2      CO2 24   BUN 18   CREATININE 0.8   MG 1.8       Recent Labs   Lab 07/21/22  0544   WBC 3.70*   RBC 3.35*   HGB 8.7*   HCT 29.4*      MCV 88   MCH 26.0*   MCHC 29.6*         Assessment and  Plan:    Moisture associated dermatitis  - initiated miconazole ointment BID to perineal area    Dysuria  - checking UA with reflex as patient has recently completed IV antibiotics for UTI.    RLE pain  - previous ultrasound negative for DVT  - improved, continue oxycodone 10 mg or 15mg  q.4 hours PRN,  BenGay topical cream TID    Hypomagnesemia  - magnesium oxide 400 mg BID     Bacteremia  UTI   - presence of large bilateral renal stones complicates picture, although non-obstructive  - bilateral DVT US is negative  - repeat blood cx -ngtd  - continue CTX 1g IV q 24H - will need 2 weeks from negative cx - start date is 7/2- end date is 7/18  - possible that fever is related to carcinoid      NSTEMI (non-ST elevated myocardial infarction)  - Trop trending down. No EKG changes or c/o chest pain  - TTE reviewed.  - follow-up with Cardiology as outpatient     Chronic diastolic (congestive) heart failure  - No signs of decompensation  - Cont metoprolol  - TTE showing grade I diastolic dysfunction  - Monitor I&O      HLD (hyperlipidemia)  - Continue atorvastatin      Anemia of chronic disease  - continue monitor twice weekly CBCs, transfuse for hemoglobin < 7    Essential hypertension  - continue amlodipine 10 mg daily, metoprolol XL 25 mg daily.  Home losartan on hold due to previous FLORECITA    Metastatic carcinoid tumor  - ER physician discussed the case with Dr. Perez with neuroendocrine services  - Continue follow up outpatient as scheduled.  Not receiving any further treatment      Neuropathy pain  - continue gabapentin 30 mg qHS.    Palliative care encounter  Notes per [facility] palliative care NP  - Pt elects to remain full code/full treatment status  - She does not have advance directives.  She is  and has 4 sons. If she becomes unable to make medical decisions for herself, she would want them to share surrogate decision making responsibility  - Her primary goal at this time is to regain strength to be able to  go home, although she does mention that she is lonely at home.She understands that she is high-risk for readmission based on hospitalizations required over the last 6 months but feels that hospital encounters are not affecting her quality of life.  She is satisfied with home-based palliative care and would like for that to continue after SNF.   - Her goals include to regain strength and to extend life regardless of treatment burden. She confirms that she does want to continue cancer treatments.     Debility   - Continue with PT/OT for gait training and strengthening and restoration of ADL's   - Encourage mobility, OOB in chair, and early ambulation as appropriate  - Fall precautions   - Monitor for bowel and bladder dysfunction  - Monitor for and prevent skin breakdown and pressure ulcers  - continue DVT prophylaxis with  Lovenox      Anticipate disposition:  Home with home health      Follow-up needed during SNF stay-    Appointment to send patient to- home visit on 07/15    Follow-up needed after discharge from SNF:   - PCP, hospital follow-up  - neuroendocrine service  - Cardiology    Future Appointments   Date Time Provider Department Center   9/1/2022  9:20 AM Ervin Parikh MD [facility] UROLOGY [facility]   9/21/2022 10:40 AM DO TARAH Savage FAMCTKADI Canas NP  Department of Hospital Medicine   [facility]     DOS: 7/21/2022       Patient note was created using MModal Dictation.  Any errors in syntax or even information may not have been identified and edited on initial review prior to signing this note.

## 2022-07-21 NOTE — PLAN OF CARE
Problem: Occupational Therapy  Goal: Occupational Therapy Goal  Description: Goals to be met by: 30 days     Patient will increase functional independence with ADLs by performing:    Feeding with Modified [place].  UE Dressing with Set-up Assistance.  LE Dressing with Minimal Assistance.  Grooming while seated at sink with Modified [place].  Toileting from toilet or BSC with Minimal Assistance for hygiene and clothing management.   Bathing from  sitting at sink with Minimal Assistance.  Supine to sit with Stand-by Assistance.  Step transfer with Contact Guard Assistance with RW.  Upper extremity exercise with supervision.  Caregiver will be educated on level of assist required to safely perform self care tasks and functional transfers..     Outcome: Ongoing, Progressing

## 2022-07-21 NOTE — PT/OT/SLP PROGRESS
Occupational Therapy   Treatment    Name: Patito Domingo  MRN: [id number]  Admit Date: 7/12/2022  Admitting Diagnosis:  Bacteremia    General Precautions: Standard, fall   Orthopedic Precautions:N/A   Braces: N/A     Recommendations:     Discharge Recommendations: home health OT  Level of Assistance Recommended at Discharge: Intermittent assistance for ADL's and homemaking tasks  Discharge Equipment Recommendations:  bedside commode, bath bench, grab bar, walker, rolling, wheelchair  Barriers to discharge:  Decreased caregiver support    Assessment:     Patito Domingo is a 71 y.o. female with a medical diagnosis of Bacteremia .  She remains limited in performance of self-care , functional mobility and ADLs and currently not performing tasks at [facility] . Currently presenting with performance deficits including  weakness, impaired endurance, impaired self care skills, impaired functional mobility, gait instability, impaired balance, decreased lower extremity function, decreased safety awareness, pain, decreased coordination, impaired cardiopulmonary response to activity, impaired skin.   Pt tolerated Tx without incident and is making progress but continues to require assist to perform self care tasks, functional mobility and functional transfers .  She would continue to benefit from OT intervention to further her functional (I)ce and safety.    Rehab Potential is good    Activity tolerance:  Good    Plan:     Patient to be seen 6 x/week to address the above listed problems via self-care/home management, therapeutic activities, therapeutic exercises, neuromuscular re-education    · Plan of Care Expires: 08/13/22  · Plan of Care Reviewed with: patient    Subjective     Communicated with: nurse prior to session. .    Pain/Comfort:  · Pain Rating 1: 8/10  · Location - Orientation 1: generalized  · Location 1: sacral spine  · Pain Addressed 1: Pre-medicate for activity, Reposition, Distraction, Cessation of  Activity  · Pain Rating Post-Intervention 1: 8/10    Patient's cultural, spiritual, Mandaeism conflicts given the current situation:  · no    Objective:     Patient found up in chair with  (no lines) upon OT entry to room.    Bed Mobility:    · Patient completed Rolling/Turning to Left with  minimum assistance  · Patient completed Rolling/Turning to Right with minimum assistance  · Patient completed Scooting/Bridging with moderate assistance  · Patient completed Supine to Sit with moderate assistance     Functional Mobility/Transfers:  · Patient completed Sit <> Stand Transfer with minimum assistance  with  rolling walker   · Patient completed Bed <> Chair Transfer using Step Transfer technique with minimum assistance with rolling walker  Functional Mobility: Patient propelled W/C from her room to therapy gym using (B) UEs a distance of 165 ft with Mni to SBA and increased time required.     Activities of Daily Living:  · Grooming: set up only with grooming performed seated sinkside. .  · Bathing: moderate assistance with (A) to wash lower legs/feet and rear josé miguel area. Pt sponge bathing seated sinkside.  · Upper Body Dressing: stand by assistance with Pt doffing/donning pullover shirt.  · Lower Body Dressing: maximal assistance with (A) to thread feet into pant legs and to don socks. . RW used to steady when standing. to manage clothing over her bottom.  · Toileting: maximal assistance with Pt cleaning front josé miguel area but needing (A) to perform remanider.  Pt toileting from [facility] over toilet with grab bar to stand.    [facility] 6 Click ADL: 16    OT Exercises: UE Ergometer performed 10 minutes on UBE with Mod resistance.  UE exercises performed to increase functional endurance and strength in order increase independence when performing self care tasks, functional ambulation, W/C propulsion, and functional standing activities .    Treatment & Education:  Pt edu on POC, safety when performing self care tasks , benefit of  performing OOB activity, and safety when performing functional transfers and mobility.  - White board updated  - Self care tasks completed-- as noted above       Patient left up in chair with call button in reachEducation:      GOALS:   Multidisciplinary Problems     Occupational Therapy Goals        Problem: Occupational Therapy    Goal Priority Disciplines Outcome Interventions   Occupational Therapy Goal     OT, PT/OT Ongoing, Progressing    Description: Goals to be met by: 30 days     Patient will increase functional independence with ADLs by performing:    Feeding with Modified [place].  UE Dressing with Set-up Assistance.  LE Dressing with Minimal Assistance.  Grooming while seated at sink with Modified [place].  Toileting from toilet or BSC with Minimal Assistance for hygiene and clothing management.   Bathing from  sitting at sink with Minimal Assistance.  Supine to sit with Stand-by Assistance.  Step transfer with Contact Guard Assistance with RW.  Upper extremity exercise with supervision.  Caregiver will be educated on level of assist required to safely perform self care tasks and functional transfers..                      Time Tracking:     OT Date of Treatment: 07/21/22  OT Start Time: 0840    OT Stop Time: 0934  OT Total Time (min): 54 min    Billable Minutes:Self Care/Home Management 24  Therapeutic Activity 15  Therapeutic Exercise 15    7/21/2022

## 2022-07-21 NOTE — PROGRESS NOTES
[facility] - Skilled Nursing  Wound Care    Patient Name:  Patito Domingo   MRN:  [id number]  Date: 7/21/2022  Diagnosis: Bacteremia    History:     Past Medical History:   Diagnosis Date    Allergy     Arthritis     Cataract     Chronic diastolic (congestive) heart failure     Colon cancer     Encounter for blood transfusion     History of ESBL E. 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)        Social History     Socioeconomic History    Marital status:    Occupational History    Occupation: disabled    Tobacco Use    Smoking status: Never Smoker    Smokeless tobacco: Never Used   Substance and Sexual Activity    Alcohol use: No     Alcohol/week: 0.0 standard drinks    Drug use: No    Sexual activity: Not Currently   Social History Narrative    Pt lives with son     Social Determinants of Health     Financial Resource Strain: Medium Risk    Difficulty of Paying Living Expenses: Somewhat hard   Food Insecurity: No Food Insecurity    Worried About Running Out of Food in the Last Year: Never true    Ran Out of Food in the Last Year: Never true   Transportation Needs: No Transportation Needs    Lack of Transportation (Medical): No    Lack of Transportation (Non-Medical): No   Physical Activity: Inactive    Days of Exercise per Week: 0 days    Minutes of Exercise per Session: 0 min   Stress: No Stress Concern Present    Feeling of Stress : Only a little   Social Connections: Socially Isolated    Frequency of Communication with Friends and Family: Three times a week    Frequency of Social Gatherings with Friends and Family: Once a week    Attends Yarsani Services: Never    Active Member of Clubs or Organizations: No    Attends Club or Organization  Meetings: Never    Marital Status:    Housing Stability: Low Risk     Unable to Pay for Housing in the Last Year: No    Number of Places Lived in the Last Year: 1    Unstable Housing in the Last Year: No       Precautions:     Allergies as of 07/12/2022 - Reviewed 07/12/2022   Allergen Reaction Noted    Contrast media Hives, Itching, and Swelling 09/24/2014    Epinephrine Anaphylaxis 09/24/2014    Ibuprofen Hives, Itching, and Swelling 09/24/2014    Zofran [ondansetron hcl] Itching 03/04/2022    Iodinated contrast media      Morphine Other (See Comments) 04/03/2022    Sulfa (sulfonamide antibiotics) Hives, Itching, and Swelling 09/24/2014       WOC Assessment Details/Treatment   Wound care follow-up  The right buttock partial thickness skin loss (ruptured blister area) is improving. The josé miguel-wound has pale pink skin on medial aspect. 90% red/pink and 10% tan slough.   Wound care treatment discussed- patient verbalized understanding.  She expressed burning on urination- has urinary management system in place to wall vacuum. She wants to leave in place. Reported to unit nurse/NP.    Plan:  Right buttock- continue Triad ointment as ordered.   Triad will assist with moisture management, moist wound healing, and autolytic debridement of non-viable tissue     Nursing to continue care, pressure prevention measure  Wound care will follow-up  Recommendations made to primary team for above plan per written report. Orders placed.      07/21/22 0820        Altered Skin Integrity 07/07/22 1104 Right Buttocks Shearing Partial thickness tissue loss. Shallow open ulcer with a red or pink wound bed, without slough. Intact or Open/Ruptured Serum-filled blister.   Date First Assessed/Time First Assessed: 07/07/22 1104   Altered Skin Integrity Present on Admission: suspected hospital acquired  Side: Right  Location: Buttocks  Is this injury device related?: No  Primary Wound Type: Shearing  Description of Altere...   Wound  Image    Dressing Appearance Intact;Moist drainage   Drainage Amount Scant   Drainage Characteristics/Odor Serosanguineous;Creamy   Appearance Red;Tan;Slough;Moist   Tissue loss description Partial thickness   Periwound Area Intact;Moist   Wound Edges Open;Jagged;Irregular   Wound Length (cm) 5 cm   Wound Width (cm) 5 cm   Wound Depth (cm) 0.2 cm   Wound Volume (cm^3) 5 cm^3   Wound Surface Area (cm^2) 25 cm^2   Care Cleansed with:;Sterile normal saline;Applied:;Skin Barrier   Dressing Removed;Island/border;Changed   Skin Interventions   Device Skin Pressure Protection absorbent pad utilized/changed;positioning supports utilized;pressure points protected   Pressure Reduction Devices positioning supports utilized;pressure-redistributing mattress utilized   Pressure Reduction Techniques frequent weight shift encouraged   Skin Protection incontinence pads utilized;skin sealant/moisture barrier applied     07/21/2022

## 2022-07-22 PROCEDURE — 25000003 PHARM REV CODE 250: Performed by: NURSE PRACTITIONER

## 2022-07-22 PROCEDURE — 97535 SELF CARE MNGMENT TRAINING: CPT | Mod: CO

## 2022-07-22 PROCEDURE — 25000003 PHARM REV CODE 250: Performed by: HOSPITALIST

## 2022-07-22 PROCEDURE — 97110 THERAPEUTIC EXERCISES: CPT | Mod: CQ

## 2022-07-22 PROCEDURE — 97116 GAIT TRAINING THERAPY: CPT | Mod: CQ

## 2022-07-22 PROCEDURE — 11000004 HC SNF PRIVATE

## 2022-07-22 PROCEDURE — 63600175 PHARM REV CODE 636 W HCPCS: Performed by: NURSE PRACTITIONER

## 2022-07-22 RX ADMIN — MICONAZOLE NITRATE: 20 CREAM TOPICAL at 09:07

## 2022-07-22 RX ADMIN — DICLOFENAC SODIUM 4 G: 10 GEL TOPICAL at 09:07

## 2022-07-22 RX ADMIN — MENTHOL, METHYL SALICYLATE: 10; 15 CREAM TOPICAL at 04:07

## 2022-07-22 RX ADMIN — Medication 400 MG: at 09:07

## 2022-07-22 RX ADMIN — METHENAMINE HIPPURATE 1 G: 1 TABLET ORAL at 09:07

## 2022-07-22 RX ADMIN — CYANOCOBALAMIN TAB 1000 MCG 1000 MCG: 1000 TAB at 09:07

## 2022-07-22 RX ADMIN — BACLOFEN 10 MG: 10 TABLET ORAL at 08:07

## 2022-07-22 RX ADMIN — AMLODIPINE BESYLATE 10 MG: 10 TABLET ORAL at 09:07

## 2022-07-22 RX ADMIN — LIDOCAINE 1 PATCH: 50 PATCH CUTANEOUS at 05:07

## 2022-07-22 RX ADMIN — PANTOPRAZOLE SODIUM 40 MG: 40 TABLET, DELAYED RELEASE ORAL at 09:07

## 2022-07-22 RX ADMIN — Medication 400 MG: at 08:07

## 2022-07-22 RX ADMIN — METHENAMINE HIPPURATE 1 G: 1 TABLET ORAL at 08:07

## 2022-07-22 RX ADMIN — OXYCODONE HYDROCHLORIDE AND ACETAMINOPHEN 1000 MG: 500 TABLET ORAL at 09:07

## 2022-07-22 RX ADMIN — OXYCODONE HYDROCHLORIDE AND ACETAMINOPHEN 1000 MG: 500 TABLET ORAL at 08:07

## 2022-07-22 RX ADMIN — MENTHOL, METHYL SALICYLATE: 10; 15 CREAM TOPICAL at 09:07

## 2022-07-22 RX ADMIN — OXYCODONE HYDROCHLORIDE 15 MG: 10 TABLET ORAL at 09:07

## 2022-07-22 RX ADMIN — METOPROLOL SUCCINATE 25 MG: 25 TABLET, EXTENDED RELEASE ORAL at 09:07

## 2022-07-22 RX ADMIN — ATORVASTATIN CALCIUM 40 MG: 40 TABLET, FILM COATED ORAL at 05:07

## 2022-07-22 RX ADMIN — OXYCODONE HYDROCHLORIDE 15 MG: 10 TABLET ORAL at 11:07

## 2022-07-22 RX ADMIN — OXYCODONE HYDROCHLORIDE 15 MG: 10 TABLET ORAL at 05:07

## 2022-07-22 RX ADMIN — ENOXAPARIN SODIUM 40 MG: 100 INJECTION SUBCUTANEOUS at 05:07

## 2022-07-22 NOTE — PLAN OF CARE
Problem: Adult Inpatient Plan of Care  Goal: Plan of Care Review  7/22/2022 1731 by Zeny Flores RN  Outcome: Ongoing, Progressing  7/22/2022 1252 by Zeny Flores RN  Outcome: Ongoing, Progressing  Goal: Patient-Specific Goal (Individualized)  7/22/2022 1731 by Zeny Flores RN  Outcome: Ongoing, Progressing  7/22/2022 1252 by Zeny Flores RN  Outcome: Ongoing, Progressing  Goal: Absence of Hospital-Acquired Illness or Injury  7/22/2022 1731 by Zeny Flores RN  Outcome: Ongoing, Progressing  7/22/2022 1252 by Zeny Flores RN  Outcome: Ongoing, Progressing  Goal: Optimal Comfort and Wellbeing  7/22/2022 1731 by Zeny Flores RN  Outcome: Ongoing, Progressing  7/22/2022 1252 by Zeny Flores RN  Outcome: Ongoing, Progressing  Goal: Readiness for Transition of Care  7/22/2022 1731 by Zeny Flores RN  Outcome: Ongoing, Progressing  7/22/2022 1252 by Zeny Flores RN  Outcome: Ongoing, Progressing     Problem: Glycemic Control Impaired (Sepsis/Septic Shock)  Goal: Blood Glucose Level Within Desired Range  7/22/2022 1731 by Zeny Flores RN  Outcome: Ongoing, Progressing  7/22/2022 1252 by Zeny Flores RN  Outcome: Ongoing, Progressing     Problem: Infection Progression (Sepsis/Septic Shock)  Goal: Absence of Infection Signs and Symptoms  7/22/2022 1731 by Zeny Flores RN  Outcome: Ongoing, Progressing  7/22/2022 1252 by Zeny Flores RN  Outcome: Ongoing, Progressing     Problem: Nutrition Impaired (Sepsis/Septic Shock)  Goal: Optimal Nutrition Intake  7/22/2022 1731 by Zeny Flores RN  Outcome: Ongoing, Progressing  7/22/2022 1252 by Zeny Flores RN  Outcome: Ongoing, Progressing     Problem: Renal Function Impairment (Acute Kidney Injury/Impairment)  Goal: Effective Renal Function  7/22/2022 1731 by Zeny Flores RN  Outcome: Ongoing, Progressing  7/22/2022 1252 by Zeny Flores RN  Outcome: Ongoing,  Progressing

## 2022-07-22 NOTE — PT/OT/SLP PROGRESS
Occupational Therapy   Treatment    Name: Patito Domingo  MRN: [id number]  Admit Date: 7/12/2022  Admitting Diagnosis:  Bacteremia    General Precautions: Standard, fall   Orthopedic Precautions:N/A   Braces:       Recommendations:     Discharge Recommendations: home health OT  Level of Assistance Recommended at Discharge: 24 hours light assistance for ADL's and homemaking tasks  Discharge Equipment Recommendations:  bedside commode, bath bench, grab bar, walker, rolling, wheelchair  Barriers to discharge:  Decreased caregiver support    Assessment:     Patito Domingo is a 71 y.o. female with a medical diagnosis of Bacteremia She presents with  Performance deficits affecting function are are weakness, impaired endurance, impaired self care skills, impaired functional mobilty, gait instability, pain, edema, decreased lower extremity function.        .   Pt. Was cooperative and participated well with session on this day. Pt. Still continues to complaint of discomfort in buttox area with prolonged  sitting Pt  continues to demonstrate levels of physical deficits with  functional indep with daily management activities tasks, selfcare skills with balance,  functional mobility, UB strength and endurance. Pt. Will continue to benefit from continued OT to progress towards goals     Rehab Potential is fair    Activity tolerance:  Fair    Plan:     Patient to be seen 6 x/week to address the above listed problems via self-care/home management, therapeutic activities, therapeutic exercises, neuromuscular re-education    · Plan of Care Expires: 08/13/22  · Plan of Care Reviewed with: patient    Subjective     Communicated with: Mike  prior to session.II can't sit to long mu bottom hurts    Pain/Comfort:  Pain Rating 1: 6/10  Location 1: sacral spine  Pain Addressed 1: Reposition, Distraction, Pre-medicate for activity, Nurse notified  Pain Rating Post-Intervention 1: 8/10    Patient's cultural, spiritual, Hoahaoism conflicts  given the current situation:  no    Objective:     Patient found HOB elevated   upon OT entry to room.    Bed Mobility:    · Patient completed Scooting/Bridging with minimum assistance  · Patient completed Supine to Sit with minimum assistance     Functional Mobility/Transfers:  · Patient completed Sit <> Stand Transfer with minimum assistance  with  rolling walker   · Patient completed Bed <> Chair Transfer using Stand Pivot technique with minimum assistance with no assistive device  · Patient completed Toilet Transfer Stand Pivot technique with minimum assistance with  no AD    Activities of Daily Living:  · Grooming: supervision at sink level with grooming needs  · Bathing: moderate assistance for BLE's at sink level  · Upper Body Dressing: minimum assistance to doff/lisa pulll over shirt  · Lower Body Dressing: maximal assistance to lisa pants EOB and to mange over hips instance with RW for bal  · Toileting: minimum assistance with cleaning and clothing/diaper management from 3n1 level    [facility] 6 Click ADL: 16    Treatment & Education:  Pt edu on role of OT, POC, safety when performing self care tasks , benefit of performing OOB activity, and safety when performing functional transfers and mobility management for preparation with goals to progress towards next level of care    Patient left up in chair with all lines intact and call button in reachEducation:      GOALS:   Multidisciplinary Problems     Occupational Therapy Goals        Problem: Occupational Therapy    Goal Priority Disciplines Outcome Interventions   Occupational Therapy Goal     OT, PT/OT Ongoing, Progressing    Description: Goals to be met by: 30 days     Patient will increase functional independence with ADLs by performing:    Feeding with Modified [place].  UE Dressing with Set-up Assistance.  LE Dressing with Minimal Assistance.  Grooming while seated at sink with Modified [place].  Toileting from toilet or BSC with Minimal  Assistance for hygiene and clothing management.   Bathing from  sitting at sink with Minimal Assistance.  Supine to sit with Stand-by Assistance.  Step transfer with Contact Guard Assistance with RW.  Upper extremity exercise with supervision.  Caregiver will be educated on level of assist required to safely perform self care tasks and functional transfers..                      Time Tracking:     OT Date of Treatment: 07/22/22  OT Start Time: 0735    OT Stop Time: 0813  OT Total Time (min): 38 min    Billable Minutes:Self Care/Home Management 38    7/22/2022

## 2022-07-22 NOTE — PROGRESS NOTES
[facility]                                  Skilled Nursing Facility                   Progress Note     Admit Date: 7/12/2022  JULIO C TBD  Principal Problem:  Bacteremia   HPI obtained from patient interview and chart review     Chief Complaint: .Re-evaluation of medical treatment and therapy status:  Evaluation of UA    HPI:   Mrs. 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  Admission to SNF for secondary weakness and debility.     Interval history:  24 hr vital sign ranges listed below.  UA revealed 2+ leukocytes, 67 WBC with occasional bacteria.  Patient states dysuria better today, will await finalized culture to see if any organism grows and will treat from there.  Patient denies shortness of breath, abdominal discomfort, nausea, or vomiting.  Patient reports an adequate appetite.  Patient denies dysuria.  Patient reports having regular bowel movements.  Patient progessing with PT/OT. Continuing to follow and treat all acute and chronic conditions.    Past Medical History: Patient has a past medical history of Allergy, Arthritis, Cataract, Chronic diastolic (congestive) heart failure, Colon cancer, Encounter for blood transfusion, History of ESBL E. 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    Past Surgical History: Patient has a past surgical history that includes Liver biopsy (9/14); Lithotripsy; cystoscope; Hysterectomy (5/1996); Colon surgery; Eye surgery; Cataract extraction (Left, 10/2017); Cholecystectomy; Cystoscopy w/ retrogrades (Right, 10/10/2019);  Ureteroscopy (Right, 10/10/2019);  section; Uterine fibroid surgery; Abdominal surgery; ERCP (N/A, 2021); and ERCP (N/A, 3/4/2022).    Social History: Patient reports that she has never smoked. She has never used smokeless tobacco. She reports that she does not drink alcohol and does not use drugs.    Family History: family history includes Alzheimer's disease in her father; Cancer in her mother; No Known Problems in her son, son, son, and son; Stroke in her sister.    Allergies: Patient is allergic to contrast media, epinephrine, ibuprofen, zofran [ondansetron hcl], iodinated contrast media, morphine, and sulfa (sulfonamide antibiotics).    ROS  Constitutional: Negative for fever   Eyes: Negative for blurred vision, double vision   Respiratory: Negative for cough, shortness of breath   Cardiovascular: Negative for chest pain, palpitations, and leg swelling.   Gastrointestinal: Negative for abdominal pain, constipation, diarrhea, nausea, vomiting.   Genitourinary: neg for dysuria, frequency   Musculoskeletal:  + generalized weakness.  +RLE pain  Skin: Negative for itching and rash.   Neurological: Negative for dizziness, headaches.   Psychiatric/Behavioral: Negative for depression. The patient is not nervous/anxious.      24 hour Vital Sign Range   Temp:  [97.7 °F (36.5 °C)-98.1 °F (36.7 °C)]   Pulse:  [78-97]   Resp:  [16-20]   BP: (126-136)/(60-62)   SpO2:  [94 %-99 %]     PEx  Constitutional: Patient appears debilitated.  No distress noted  HENT:   Head: Normocephalic and atraumatic.   Eyes: Pupils are equal, round  Neck: Normal range of motion. Neck supple.   Cardiovascular: Normal rate, regular rhythm and normal heart sounds.    Pulmonary/Chest: Effort normal and breath sounds are clear  Abdominal: Soft. Bowel sounds are normal.   Musculoskeletal: Normal range of motion.   Neurological: Alert and oriented to person, place, and time.   Psychiatric: Normal mood and affect. Behavior is normal.    Skin: Skin is warm and dry.      Altered Skin Integrity 07/07/22 1104 Right Buttocks Shearing Partial thickness tissue loss. Shallow open ulcer with a red or pink wound bed, without slough. Intact or Open/Ruptured Serum-filled blister.   Date First Assessed/Time First Assessed: 07/07/22 1104   Altered Skin Integrity Present on Admission: suspected hospital acquired  Side: Right  Location: Buttocks  Is this injury device related?: No  Primary Wound Type: Shearing  Description of Altere...   Dressing Appearance Intact;Moist drainage   Drainage Amount Scant   Drainage Characteristics/Odor Serosanguineous;Creamy   Appearance Red;Tan;Slough;Moist   Tissue loss description Partial thickness   Periwound Area Intact;Moist   Wound Edges Open;Jagged;Irregular   Wound Length (cm) 5 cm   Wound Width (cm) 5 cm   Wound Depth (cm) 0.2 cm   Wound Volume (cm^3) 5 cm^3   Wound Surface Area (cm^2) 25 cm^2   Care Cleansed with:;Sterile normal saline;Applied:;Skin Barrier   Dressing Removed;Island/border;Changed   Skin Interventions   Device Skin Pressure Protection absorbent pad utilized/changed;positioning supports utilized;pressure points protected   Pressure Reduction Devices positioning supports utilized;pressure-redistributing mattress utilized   Pressure Reduction Techniques frequent weight shift encouraged   Skin Protection incontinence pads utilized;skin sealant/moisture barrier applied         No results for input(s): GLUCOSE, NA, K, CL, CO2, BUN, CREATININE, MG in the last 24 hours.    Invalid input(s):  CALCIUM    No results for input(s): WBC, RBC, HGB, HCT, PLT, MCV, MCH, MCHC in the last 24 hours.      Assessment and Plan:    Dysuria  - checking UA with reflex as patient has recently completed IV antibiotics for UTI.  - improved,  UA revealed 2+ leukocytes, 67 WBC with occasional bacteria.  Patient states dysuria better today, will await finalized culture to see if any organism grows and will treat from there.       Moisture associated dermatitis  - miconazole ointment BID to perineal area    RLE pain  - previous ultrasound negative for DVT  - improved, continue oxycodone 10 mg or 15mg  q.4 hours PRN,  BenGay topical cream TID    Hypomagnesemia  - magnesium oxide 400 mg BID     Bacteremia  UTI   - presence of large bilateral renal stones complicates picture, although non-obstructive  - bilateral DVT US is negative  - repeat blood cx -ngtd  - continue CTX 1g IV q 24H - will need 2 weeks from negative cx - start date is 7/2- end date is 7/18  - possible that fever is related to carcinoid      NSTEMI (non-ST elevated myocardial infarction)  - Trop trending down. No EKG changes or c/o chest pain  - TTE reviewed.  - follow-up with Cardiology as outpatient     Chronic diastolic (congestive) heart failure  - No signs of decompensation  - Cont metoprolol  - TTE showing grade I diastolic dysfunction  - Monitor I&O      HLD (hyperlipidemia)  - Continue atorvastatin      Anemia of chronic disease  - continue monitor twice weekly CBCs, transfuse for hemoglobin < 7    Essential hypertension  - continue amlodipine 10 mg daily, metoprolol XL 25 mg daily.  Home losartan on hold due to previous FLORECITA    Metastatic carcinoid tumor  - ER physician discussed the case with Dr. Perez with neuroendocrine services  - Continue follow up outpatient as scheduled.  Not receiving any further treatment      Neuropathy pain  - continue gabapentin 30 mg qHS.    Palliative care encounter  Notes per [facility] [place]  - Pt elects to remain full code/full treatment status  - She does not have advance directives.  She is  and has 4 sons. If she becomes unable to make medical decisions for herself, she would want them to share surrogate decision making responsibility  - Her primary goal at this time is to regain strength to be able to go home, although she does mention that she is lonely at home.She understands that she is high-risk for  readmission based on hospitalizations required over the last 6 months but feels that hospital encounters are not affecting her quality of life.  She is satisfied with home-based palliative care and would like for that to continue after SNF.   - Her goals include to regain strength and to extend life regardless of treatment burden. She confirms that she does want to continue cancer treatments.     Debility   - Continue with PT/OT for gait training and strengthening and restoration of ADL's   - Encourage mobility, OOB in chair, and early ambulation as appropriate  - Fall precautions   - Monitor for bowel and bladder dysfunction  - Monitor for and prevent skin breakdown and pressure ulcers  - continue DVT prophylaxis with  Lovenox      Anticipate disposition:  Home with home health      Follow-up needed during SNF stay-    Appointment to send patient to- home visit on 07/15    Follow-up needed after discharge from SNF:   - PCP, hospital follow-up  - neuroendocrine service  - Cardiology    Future Appointments   Date Time Provider Department Center   9/1/2022  9:20 AM Ervin Parikh MD [facility] UROLOGY [facility]   9/21/2022 10:40 AM DO TARAH SavageCTKADI Canas NP  Department of Hospital Medicine   [facility]- Skilled Nursing [facility]     DOS: 7/22/2022       Patient note was created using MModal Dictation.  Any errors in syntax or even information may not have been identified and edited on initial review prior to signing this note.

## 2022-07-22 NOTE — PROGRESS NOTES
[facility] - Skilled Nursing  Adult Nutrition  Progress Note    SUMMARY   Recommendations  Continue regular diet, boost glucose TID, RD following  Goals: 1. Pt's intake meals >50% by RD follow up.  Nutrition Goal Status: goal met  Communication of RD Recs: other (comment) (POC)    Assessment and Plan  Inadequate oral intake     Related to (etiology):   Decreased appetite     Signs and Symptoms (as evidenced by):   Pt with abdominal pain, intake meals 0-25% over last week in hospital.      Interventions(treatment strategy):  Collaboration of care with providers.  General healthy diet.  Commercial beverage: Boost Glucose Control TID     Nutrition Diagnosis Status: improving      Malnutrition Assessment 7/13/22             Energy Intake (Malnutrition): less than or equal to 50% for greater than or equal to 5 days   Orbital Region (Subcutaneous Fat Loss): well nourished  Upper Arm Region (Subcutaneous Fat Loss): well nourished  Thoracic and Lumbar Region: well nourished   [place] Region (Muscle Loss): well nourished  Clavicle Bone Region (Muscle Loss): well nourished  Clavicle and Acromion Bone Region (Muscle Loss): well nourished  Scapular Bone Region (Muscle Loss): well nourished  Dorsal Hand (Muscle Loss): well nourished  Patellar Region (Muscle Loss): well nourished  Anterior Thigh Region (Muscle Loss): well nourished  Posterior Calf Region (Muscle Loss): well nourished   Edema (Fluid Accumulation): 0-->no edema present             Reason for Assessment    Reason For Assessment: RD follow-up  Diagnosis: infection/sepsis  Relevant Medical History: 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 shallow ulcer to R buttocks  Nutrition Discharge Planning: heart healthy diet    Nutrition Risk Screen    Nutrition Risk Screen: no indicators  "present    Nutrition/Diet History    Patient Reported Diet/Restrictions/Preferences: general  Typical Food/Fluid Intake: daughter makes meals  Food Preferences: pt craved junk foods during chemo treatments  Spiritual, Cultural Beliefs, Tenriism Practices, Values that Affect Care: no    Anthropometrics    Temp: 98.1 °F (36.7 °C)  Height: 5' 6" (167.6 cm)  Height (inches): 66 in  Weight Method: Bed Scale  Weight: 66.8 kg (147 lb 4.3 oz)  Weight (lb): 147.27 lb  Ideal Body Weight (IBW), Female: 130 lb  % Ideal Body Weight, Female (lb): 139.23 %  BMI (Calculated): 23.8       Lab/Procedures/Meds    Pertinent Labs Reviewed: reviewed  Pertinent Labs Comments: Hg 8.7, zhct 29.4,  Pertinent Medications Reviewed: reviewed  Pertinent Medications Comments: vitamin C/B12, gabapentin, magnesium oxide, protonix, senna, colace       Estimated/Assessed Needs    Weight Used For Calorie Calculations: 82.1 kg (181 lb)  Energy Calorie Requirements (kcal): 1704 kcal  Energy Need Method: Dola-St Jeor (PAL 1.25)  Protein Requirements: 82-99g  Weight Used For Protein Calculations: 82.1 kg (181 lb)        RDA Method (mL): 1704       Nutrition Prescription Ordered    Current Diet Order: Regular  Nutrition Order Comments: PO %  Oral Nutrition Supplement: Boost glucose TID    Evaluation of Received Nutrient/Fluid Intake    I/O: +713 to date  Energy Calories Required: meeting needs  Protein Required: meeting needs  Fluid Required: meeting needs  Comments: LBM 7/20  Tolerance: tolerating  % Intake of Estimated Energy Needs: 75 - 100 %  % Meal Intake: 75 - 100 %    Nutrition Risk    Level of Risk/Frequency of Follow-up: low (one time per week)     Monitor and Evaluation    Food and Nutrient Intake: energy intake, food and beverage intake  Food and Nutrient Adminstration: diet order  Knowledge/Beliefs/Attitudes: food and nutrition knowledge/skill  Anthropometric Measurements: weight, weight change  Biochemical Data, Medical Tests and " Procedures: electrolyte and renal panel, gastrointestinal profile, glucose/endocrine profile, inflammatory profile, lipid profile  Nutrition-Focused Physical Findings: overall appearance, extremities, muscles and bones, head and eyes, skin     Nutrition Follow-Up    RD Follow-up?: Yes

## 2022-07-22 NOTE — PT/OT/SLP PROGRESS
"Physical Therapy Treatment    Patient Name:  Patito Domingo   MRN:  [id number]  Admit Date: 7/12/2022  Admitting Diagnosis: Bacteremia  Recent Surgeries:     General Precautions: Standard, fall   Orthopedic Precautions:N/A   Braces:       Recommendations:     Discharge Recommendations:  home health PT   Level of Assistance Recommended at Discharge: 24 hours light assistance  Discharge Equipment Recommendations: bedside commode, bath bench, grab bar, rollator, shower chair, walker, rolling, wheelchair   Barriers to discharge: Decreased caregiver support (increased assistance needed)    Assessment:     Patito Domingo is a 71 y.o. female admitted with a medical diagnosis of Bacteremia . Pt tolerated well, pt would continue to benefit from skilled PT services to improve overall functional mobility, strength and endurance.  .      Performance deficits affecting function:  weakness, impaired endurance, impaired self care skills, impaired functional mobility, gait instability, impaired balance, pain, impaired skin, impaired cardiopulmonary response to activity .    Rehab Potential is good    Activity Tolerance: Fair    Plan:     Patient to be seen 6 x/week to address the above listed problems via gait training, therapeutic activities, therapeutic exercises, neuromuscular re-education, wheelchair management/training    · Plan of Care Expires: 08/02/22  · Plan of Care Reviewed with: patient    Subjective     "if I do it now I'll be finished?" agreeable to therapy.     Pain/Comfort:  · Pain Rating 1: 8/10  · Location - Orientation 1: generalized  · Location 1: sacral spine  · Pain Addressed 1: Pre-medicate for activity, Reposition, Distraction, Cessation of Activity, Nurse notified (ed/reviewed pressure relief, sitting on pressure relief cushion)  · Pain Rating Post-Intervention 1: 8/10    Patient's cultural, spiritual, Roman Catholic conflicts given the current situation:  · no    Objective:       Patient found with  (in WC) " upon PT entry to room.     Therapeutic Activities and Exercises: 2x10 reps AP,LAQ,hip flex,abd/add   Rows/press/bicep curls 2x10 reps 2>5 dowel with RTB    Functional Mobility:  · Transfers:     · Sit to Stand:  stand by assistance with rolling walker  · Gait: amb with RW CGA vcs for erect posture ~ 70 ft and 40 ft seated rest break 1 turn each trial no LOB  · Wheelchair Propulsion: ~ 60 ft with BUE S slow pace, inc time    AM-PAC 6 CLICK MOBILITY  16    Patient left up in chair with call button in reach and belongings in reach.    GOALS:   Multidisciplinary Problems     Physical Therapy Goals        Problem: Physical Therapy    Goal Priority Disciplines Outcome Goal Variances Interventions   Physical Therapy Goal     PT, PT/OT Ongoing, Progressing     Description: Goals to be met in 30 days (2022):     Patient will increase functional independence with mobility by performin. Supine to sit with MInimal Assistance.  2. Sit to supine with MInimal Assistance.  3. Rolling to Left and Right with Minimal Assistance.  4. Sit to stand transfer with Contact Guard Assistance.  5. Bed to chair transfer with Minimal Assistance using Rolling Walker -met  Updated 2022: Bed to chair transfer with CGA using Rolling Walker.    6. Gait x 50 feet with Minimal Assistance using Rolling Walker.   7. Wheelchair propulsion x 50 feet with Stand-by Assistance using bilateral upper extremities.  8. Ascend/Descend 4 inch curb step with Moderate Assistance using Rolling Walker.    9. Lower extremity exercise program x20 reps per handout, with supervision. -met  Updated 2022: Lower extremity exercise program x30 reps per handout, with supervision.                     Time Tracking:     PT Received On: 22  PT Start Time: 831  PT Stop Time: 901  PT Total Time (min): 30 min    Billable Minutes: Gait Training 15 and Therapeutic Exercise 15    Treatment Type: Treatment  PT/PTA: PTA     PTA Visit Number: 1      07/22/2022

## 2022-07-23 LAB
BACTERIA UR CULT: NORMAL
BACTERIA UR CULT: NORMAL

## 2022-07-23 PROCEDURE — 97530 THERAPEUTIC ACTIVITIES: CPT | Mod: CQ

## 2022-07-23 PROCEDURE — 25000003 PHARM REV CODE 250: Performed by: HOSPITALIST

## 2022-07-23 PROCEDURE — 97110 THERAPEUTIC EXERCISES: CPT | Mod: CQ

## 2022-07-23 PROCEDURE — 97116 GAIT TRAINING THERAPY: CPT | Mod: CQ

## 2022-07-23 PROCEDURE — 97535 SELF CARE MNGMENT TRAINING: CPT | Mod: CO

## 2022-07-23 PROCEDURE — 25000003 PHARM REV CODE 250: Performed by: NURSE PRACTITIONER

## 2022-07-23 PROCEDURE — 11000004 HC SNF PRIVATE

## 2022-07-23 PROCEDURE — 63600175 PHARM REV CODE 636 W HCPCS: Performed by: NURSE PRACTITIONER

## 2022-07-23 RX ADMIN — Medication 400 MG: at 09:07

## 2022-07-23 RX ADMIN — Medication 6 MG: at 09:07

## 2022-07-23 RX ADMIN — CYANOCOBALAMIN TAB 1000 MCG 1000 MCG: 1000 TAB at 09:07

## 2022-07-23 RX ADMIN — MICONAZOLE NITRATE: 20 CREAM TOPICAL at 09:07

## 2022-07-23 RX ADMIN — METHENAMINE HIPPURATE 1 G: 1 TABLET ORAL at 09:07

## 2022-07-23 RX ADMIN — DICLOFENAC SODIUM 4 G: 10 GEL TOPICAL at 09:07

## 2022-07-23 RX ADMIN — AMLODIPINE BESYLATE 10 MG: 10 TABLET ORAL at 09:07

## 2022-07-23 RX ADMIN — METOPROLOL SUCCINATE 25 MG: 25 TABLET, EXTENDED RELEASE ORAL at 09:07

## 2022-07-23 RX ADMIN — LIDOCAINE 1 PATCH: 50 PATCH CUTANEOUS at 04:07

## 2022-07-23 RX ADMIN — ENOXAPARIN SODIUM 40 MG: 100 INJECTION SUBCUTANEOUS at 04:07

## 2022-07-23 RX ADMIN — OXYCODONE HYDROCHLORIDE 15 MG: 10 TABLET ORAL at 04:07

## 2022-07-23 RX ADMIN — MENTHOL, METHYL SALICYLATE: 10; 15 CREAM TOPICAL at 02:07

## 2022-07-23 RX ADMIN — PANTOPRAZOLE SODIUM 40 MG: 40 TABLET, DELAYED RELEASE ORAL at 09:07

## 2022-07-23 RX ADMIN — ATORVASTATIN CALCIUM 40 MG: 40 TABLET, FILM COATED ORAL at 04:07

## 2022-07-23 RX ADMIN — OXYCODONE HYDROCHLORIDE 15 MG: 10 TABLET ORAL at 10:07

## 2022-07-23 RX ADMIN — MENTHOL, METHYL SALICYLATE: 10; 15 CREAM TOPICAL at 09:07

## 2022-07-23 RX ADMIN — OXYCODONE HYDROCHLORIDE AND ACETAMINOPHEN 1000 MG: 500 TABLET ORAL at 09:07

## 2022-07-23 NOTE — PT/OT/SLP PROGRESS
"Physical Therapy Treatment    Patient Name:  Patito Domingo   MRN:  [id number]  Admit Date: 7/12/2022  Admitting Diagnosis: Bacteremia  Recent Surgeries:     General Precautions: Standard, fall   Orthopedic Precautions:N/A   Braces:       Recommendations:     Discharge Recommendations:  home health PT   Level of Assistance Recommended at Discharge: 24 hours light assistance  Discharge Equipment Recommendations: bedside commode, bath bench, grab bar, rollator, shower chair, walker, rolling, wheelchair   Barriers to discharge: Decreased caregiver support (increased assistance needed)    Assessment:     Patito Domingo is a 71 y.o. female admitted with a medical diagnosis of Bacteremia . Pt tolerated well, pt would continue to benefit from skilled PT services to improve overall functional mobility, strength and endurance.  .      Performance deficits affecting function:  weakness, impaired endurance, impaired self care skills, impaired functional mobility, gait instability, impaired balance, pain, impaired skin, impaired cardiopulmonary response to activity .    Rehab Potential is good    Activity Tolerance: Fair    Plan:     Patient to be seen 6 x/week to address the above listed problems via gait training, therapeutic activities, therapeutic exercises, neuromuscular re-education, wheelchair management/training    · Plan of Care Expires: 08/02/22  · Plan of Care Reviewed with: patient    Subjective     "my butt hurts" agreeable to therapy, nsg notified, ed/reviewed pressure relief.     Pain/Comfort:  Pain Rating 1: 8/10  Location - Side 1: Right  Location - Orientation 1: generalized  Location 1: sacral spine  Pain Addressed 1: Pre-medicate for activity, Reposition, Distraction, Cessation of Activity, Nurse notified  Pain Rating Post-Intervention 1:  (inc with sitting, ed on wt shifting/off loading, nsg notified, oitment requested)    Patient's cultural, spiritual, Baptism conflicts given the current " situation:  no    Objective:       Patient found with  (in wc) upon PT entry to room.     Therapeutic Activities and Exercises: 2x10 reps AP,LAQ,hip flex,abd/add   Rows/press/bicep curls 2.5 dowel RTB  vcs for taking her time and complete ROM    Functional Mobility:  · Transfers:     · Sit to Stand:  stand by assistance with rolling walker and vcs to always ensure brakes/fall risk  · Gait: amb with RW SBA ~ 56 ft and 66 ft seated rest break, vcs for erect posture and staying closer to RW  · Stairs:  Asc/vinnie with RW CGA vcs/demo for tech/safety  · Wheelchair Propulsion: ~ 90 ft with BUE S inc time    AM-PAC 6 CLICK MOBILITY  16    Patient left up in chair with call button in reach and belongings in reach.    GOALS:   Multidisciplinary Problems     Physical Therapy Goals        Problem: Physical Therapy    Goal Priority Disciplines Outcome Goal Variances Interventions   Physical Therapy Goal     PT, PT/OT Ongoing, Progressing     Description: Goals to be met in 30 days (2022):     Patient will increase functional independence with mobility by performin. Supine to sit with MInimal Assistance.  2. Sit to supine with MInimal Assistance.  3. Rolling to Left and Right with Minimal Assistance.  4. Sit to stand transfer with Contact Guard Assistance. met  5. Bed to chair transfer with Minimal Assistance using Rolling Walker -met  Updated 2022: Bed to chair transfer with CGA using Rolling Walker.    6. Gait x 50 feet with Minimal Assistance using Rolling Walker. met  7. Wheelchair propulsion x 50 feet with Stand-by Assistance using bilateral upper extremities. met  8. Ascend/Descend 4 inch curb step with Moderate Assistance using Rolling Walker. met    9. Lower extremity exercise program x20 reps per handout, with supervision. -met  Updated 2022: Lower extremity exercise program x30 reps per handout, with supervision.                     Time Tracking:     PT Received On: 22  PT Start Time:  0847  PT Stop Time: 0925  PT Total Time (min): 38 min    Billable Minutes: Gait Training 13, Therapeutic Activity 13 and Therapeutic Exercise 12    Treatment Type: Treatment  PT/PTA: PTA     PTA Visit Number: 2     07/23/2022

## 2022-07-23 NOTE — PLAN OF CARE
Problem: Occupational Therapy  Goal: Occupational Therapy Goal  Description: Goals to be met by: 30 days     Patient will increase functional independence with ADLs by performing:    Feeding with Modified [place].  UE Dressing with Set-up Assistance.  LE Dressing with Minimal Assistance.  Grooming while seated at sink with Modified [place].  Toileting from toilet or BSC with Minimal Assistance for hygiene and clothing management.   Bathing from  sitting at sink with Minimal Assistance.  Supine to sit with Stand-by Assistance.  Step transfer with Contact Guard Assistance with RW.  Upper extremity exercise with supervision.  Caregiver will be educated on level of assist required to safely perform self care tasks and functional transfers..     Outcome: Ongoing, Progressing

## 2022-07-23 NOTE — PLAN OF CARE
Problem: Adult Inpatient Plan of Care  Goal: Plan of Care Review  Outcome: Ongoing, Progressing  Flowsheets (Taken 7/23/2022 9594)  Plan of Care Reviewed With: patient  Goal: Absence of Hospital-Acquired Illness or Injury  Outcome: Ongoing, Progressing     Problem: Adult Inpatient Plan of Care  Goal: Absence of Hospital-Acquired Illness or Injury  Outcome: Ongoing, Progressing     Problem: Adjustment to Illness (Sepsis/Septic Shock)  Goal: Optimal Coping  Outcome: Ongoing, Progressing     Problem: Bleeding (Sepsis/Septic Shock)  Goal: Absence of Bleeding  Outcome: Ongoing, Progressing     Problem: Glycemic Control Impaired (Sepsis/Septic Shock)  Goal: Blood Glucose Level Within Desired Range  Outcome: Ongoing, Progressing     Problem: Glycemic Control Impaired (Sepsis/Septic Shock)  Goal: Blood Glucose Level Within Desired Range  Outcome: Ongoing, Progressing     Problem: Infection Progression (Sepsis/Septic Shock)  Goal: Absence of Infection Signs and Symptoms  Outcome: Ongoing, Progressing     Problem: Nutrition Impaired (Sepsis/Septic Shock)  Goal: Optimal Nutrition Intake  Outcome: Ongoing, Progressing     Problem: Fluid and Electrolyte Imbalance (Acute Kidney Injury/Impairment)  Goal: Fluid and Electrolyte Balance  Outcome: Ongoing, Progressing     Problem: Skin Injury Risk Increased  Goal: Skin Health and Integrity  Outcome: Ongoing, Progressing     Problem: Oral Intake Inadequate  Goal: Improved Oral Intake  Outcome: Ongoing, Progressing     Problem: Fall Injury Risk  Goal: Absence of Fall and Fall-Related Injury  Outcome: Ongoing, Progressing     Ms Diamond was incontinent of bladder at this time. Pt received total assistance with cleaning and applying adult brief. Critic-Aide barrier cream applied to B/L buttocks. Pt received verbal teaching on turning every 2 hrs while in bed to prevent pressure on her sacrum/buttocks. Safety measures maintained. Call light is within reach. Will continue with plan of  care.

## 2022-07-23 NOTE — PLAN OF CARE
Problem: Adult Inpatient Plan of Care  Goal: Plan of Care Review  Outcome: Ongoing, Progressing  Flowsheets (Taken 7/23/2022 0326)  Plan of Care Reviewed With: patient  Goal: Patient-Specific Goal (Individualized)  Outcome: Ongoing, Progressing  Flowsheets (Taken 7/23/2022 0326)  Anxieties, Fears or Concerns: worried that will not regain strength post infection  Individualized Care Needs: monitor transfers and ambulation  Patient-Specific Goals (Include Timeframe): Patient will be free from falls or injury during shift  Goal: Absence of Hospital-Acquired Illness or Injury  Outcome: Ongoing, Progressing  Intervention: Identify and Manage Fall Risk  Flowsheets (Taken 7/23/2022 0326)  Safety Promotion/Fall Prevention:   assistive device/personal item within reach   diversional activities provided   Fall Risk reviewed with patient/family   Fall Risk signage in place   lighting adjusted   medications reviewed   nonskid shoes/socks when out of bed   side rails raised x 3   instructed to call staff for mobility  Goal: Optimal Comfort and Wellbeing  Outcome: Ongoing, Progressing  Intervention: Provide Person-Centered Care  Flowsheets (Taken 7/23/2022 0326)  Trust Relationship/Rapport:   care explained   choices provided   emotional support provided   thoughts/feelings acknowledged   reassurance provided   questions encouraged   empathic listening provided   questions answered     Problem: Adjustment to Illness (Sepsis/Septic Shock)  Goal: Optimal Coping  Outcome: Ongoing, Progressing  Intervention: Optimize Psychosocial Adjustment to Illness  Flowsheets (Taken 7/23/2022 0326)  Supportive Measures:   active listening utilized   verbalization of feelings encouraged  Family/Support System Care:   self-care encouraged   support provided     Problem: Glycemic Control Impaired (Sepsis/Septic Shock)  Goal: Blood Glucose Level Within Desired Range  Outcome: Ongoing, Progressing  Intervention: Optimize Glycemic Control  Flowsheets  (Taken 7/23/2022 0326)  Glycemic Management: blood glucose monitored     Problem: Nutrition Impaired (Sepsis/Septic Shock)  Goal: Optimal Nutrition Intake  Outcome: Ongoing, Progressing  Intervention: Promote and Optimize Nutrition Delivery  Flowsheets (Taken 7/23/2022 0326)  Nutrition Support Management: weight trending reviewed     Problem: Infection  Goal: Absence of Infection Signs and Symptoms  Outcome: Ongoing, Progressing  Intervention: Prevent or Manage Infection  Flowsheets (Taken 7/23/2022 0326)  Infection Management: aseptic technique maintained     Problem: Skin Injury Risk Increased  Goal: Skin Health and Integrity  Outcome: Ongoing, Progressing  Intervention: Optimize Skin Protection  Flowsheets (Taken 7/23/2022 0326)  Pressure Reduction Techniques:   frequent weight shift encouraged   positioned off wounds   heels elevated off bed     Problem: Fall Injury Risk  Goal: Absence of Fall and Fall-Related Injury  Outcome: Ongoing, Progressing  Intervention: Identify and Manage Contributors  Flowsheets (Taken 7/23/2022 0326)  Self-Care Promotion:   independence encouraged   safe use of adaptive equipment encouraged   BADL personal objects within reach   BADL personal routines maintained  Medication Review/Management: medications reviewed

## 2022-07-23 NOTE — PT/OT/SLP PROGRESS
"Occupational Therapy   Treatment    Name: Patito Domingo  MRN: [id number]  Admit Date: 7/12/2022  Admitting Diagnosis:  Bacteremia    General Precautions: Standard, fall   Orthopedic Precautions:N/A   Braces:       Recommendations:     Discharge Recommendations: home health OT  Level of Assistance Recommended at Discharge: 24 hours light assistance for ADL's and homemaking tasks  Discharge Equipment Recommendations:  bedside commode, bath bench, grab bar, walker, rolling, wheelchair  Barriers to discharge:  Decreased caregiver support    Assessment:     Patito Domingo is a 71 y.o. female with a medical diagnosis of Bacteremia .  She presents with performance deficits affecting function  are weakness, impaired endurance, impaired self care skills, impaired functional mobility, gait instability, pain, edema, decreased lower extremity function.     Pt tolerated tx well. Pt self limiting and required MAX encouragement to participate in session on today. The patient required Min A for all transfers.   Pt  continues to demonstrate levels of physical deficits with  functional indep with daily management activities tasks, self care skills with balance,  functional mobility, UB strength and endurance. The patient will continue to benefit from skilled OT intervention to improve towards goals.    Rehab Potential is good    Activity tolerance:  Fair    Plan:     Patient to be seen 6 x/week to address the above listed problems via self-care/home management, therapeutic activities, therapeutic exercises, neuromuscular re-education    · Plan of Care Expires: 08/13/22  · Plan of Care Reviewed with: patient    Subjective     Communicated with: Mike prior to session. "Hello" . A client care conference was performed between the ANYA and GABRIEL, prior to treatment to discuss the patient's status, treatment plan and established goals.     Pain/Comfort:  Pain Rating 1: 0/10  Pain Rating Post-Intervention 1:  (Not rated/ C/o of buttocks " pains)    Patient's cultural, spiritual, Confucianist conflicts given the current situation:  no    Objective:     Patient found supine with   upon OT entry to room.    Bed Mobility:    · Patient completed Rolling/Turning to Right with contact guard assistance  · Patient completed Scooting/Bridging with contact guard assistance  · Patient completed Supine to Sit with contact guard assistance     Functional Mobility/Transfers:  · Patient completed Sit <> Stand Transfer with minimum assistance  with  rolling walker   · Patient completed Bed <> Chair Transfer using Stand Pivot technique with minimum assistance with rolling walker  · Patient completed Toilet Transfer Step Transfer technique with minimum assistance with  grab bars  · Functional Mobility: Pt performed ~ 8 steps from bed<>wc and ~ 8 steps from wc<>toilet    Activities of Daily Living:  · Grooming: supervision to perform oral care seated at sink.  · Bathing: contact guard assistance to perform sponge bathing UE and both josé miguel areas   · Upper Body Dressing: supervision to perform doffing/donning pullover shirt  · Lower Body Dressing: stand by assistance to perform threading BLE into pants legs and over hips in standing. Pt required Min A with balance bwhile donning pants over hips.  · Toileting: contact guard assistance to perform hygiene at raised toilet    [facility] 6 Click ADL: 16      Treatment & Education:    · Pt completed ADLs and func mobility activities for tx session as noted above    · Pt educated on role of OT and POC    Patient left up in chair with call button in reachEducation:      GOALS:   Multidisciplinary Problems     Occupational Therapy Goals        Problem: Occupational Therapy    Goal Priority Disciplines Outcome Interventions   Occupational Therapy Goal     OT, PT/OT Ongoing, Progressing    Description: Goals to be met by: 30 days     Patient will increase functional independence with ADLs by performing:    Feeding with Modified  [place].  UE Dressing with Set-up Assistance.  LE Dressing with Minimal Assistance.  Grooming while seated at sink with Modified [place].  Toileting from toilet or BSC with Minimal Assistance for hygiene and clothing management.   Bathing from  sitting at sink with Minimal Assistance.  Supine to sit with Stand-by Assistance.  Step transfer with Contact Guard Assistance with RW.  Upper extremity exercise with supervision.  Caregiver will be educated on level of assist required to safely perform self care tasks and functional transfers..                      Time Tracking:     OT Date of Treatment: 07/23/22  OT Start Time: 0807    OT Stop Time: 0846  OT Total Time (min): 39 min    Billable Minutes:Self Care/Home Management .39    7/23/2022

## 2022-07-24 PROCEDURE — 25000003 PHARM REV CODE 250: Performed by: HOSPITALIST

## 2022-07-24 PROCEDURE — 25000003 PHARM REV CODE 250: Performed by: NURSE PRACTITIONER

## 2022-07-24 PROCEDURE — 63600175 PHARM REV CODE 636 W HCPCS: Performed by: NURSE PRACTITIONER

## 2022-07-24 PROCEDURE — 11000004 HC SNF PRIVATE

## 2022-07-24 RX ADMIN — METHENAMINE HIPPURATE 1 G: 1 TABLET ORAL at 09:07

## 2022-07-24 RX ADMIN — LIDOCAINE 1 PATCH: 50 PATCH CUTANEOUS at 04:07

## 2022-07-24 RX ADMIN — CYANOCOBALAMIN TAB 1000 MCG 1000 MCG: 1000 TAB at 09:07

## 2022-07-24 RX ADMIN — MENTHOL, METHYL SALICYLATE: 10; 15 CREAM TOPICAL at 08:07

## 2022-07-24 RX ADMIN — METOPROLOL SUCCINATE 25 MG: 25 TABLET, EXTENDED RELEASE ORAL at 09:07

## 2022-07-24 RX ADMIN — MICONAZOLE NITRATE: 20 CREAM TOPICAL at 08:07

## 2022-07-24 RX ADMIN — ENOXAPARIN SODIUM 40 MG: 100 INJECTION SUBCUTANEOUS at 04:07

## 2022-07-24 RX ADMIN — BACLOFEN 10 MG: 10 TABLET ORAL at 09:07

## 2022-07-24 RX ADMIN — Medication 400 MG: at 09:07

## 2022-07-24 RX ADMIN — OXYCODONE HYDROCHLORIDE 15 MG: 10 TABLET ORAL at 04:07

## 2022-07-24 RX ADMIN — OXYCODONE HYDROCHLORIDE 15 MG: 10 TABLET ORAL at 10:07

## 2022-07-24 RX ADMIN — PANTOPRAZOLE SODIUM 40 MG: 40 TABLET, DELAYED RELEASE ORAL at 09:07

## 2022-07-24 RX ADMIN — OXYCODONE HYDROCHLORIDE 15 MG: 10 TABLET ORAL at 03:07

## 2022-07-24 RX ADMIN — MICONAZOLE NITRATE: 20 CREAM TOPICAL at 09:07

## 2022-07-24 RX ADMIN — OXYCODONE HYDROCHLORIDE AND ACETAMINOPHEN 1000 MG: 500 TABLET ORAL at 09:07

## 2022-07-24 RX ADMIN — DICLOFENAC SODIUM 4 G: 10 GEL TOPICAL at 08:07

## 2022-07-24 RX ADMIN — SENNOSIDES AND DOCUSATE SODIUM 1 TABLET: 50; 8.6 TABLET ORAL at 09:07

## 2022-07-24 RX ADMIN — OXYCODONE HYDROCHLORIDE 15 MG: 10 TABLET ORAL at 09:07

## 2022-07-24 RX ADMIN — MENTHOL, METHYL SALICYLATE: 10; 15 CREAM TOPICAL at 09:07

## 2022-07-24 RX ADMIN — AMLODIPINE BESYLATE 10 MG: 10 TABLET ORAL at 09:07

## 2022-07-24 RX ADMIN — ATORVASTATIN CALCIUM 40 MG: 40 TABLET, FILM COATED ORAL at 05:07

## 2022-07-24 RX ADMIN — DICLOFENAC SODIUM 4 G: 10 GEL TOPICAL at 09:07

## 2022-07-25 LAB
ANION GAP SERPL CALC-SCNC: 7 MMOL/L (ref 8–16)
BASOPHILS # BLD AUTO: 0.04 K/UL (ref 0–0.2)
BASOPHILS NFR BLD: 0.8 % (ref 0–1.9)
BUN SERPL-MCNC: 21 MG/DL (ref 8–23)
CALCIUM SERPL-MCNC: 9.2 MG/DL (ref 8.7–10.5)
CHLORIDE SERPL-SCNC: 108 MMOL/L (ref 95–110)
CO2 SERPL-SCNC: 27 MMOL/L (ref 23–29)
CREAT SERPL-MCNC: 0.8 MG/DL (ref 0.5–1.4)
DIFFERENTIAL METHOD: ABNORMAL
EOSINOPHIL # BLD AUTO: 0.2 K/UL (ref 0–0.5)
EOSINOPHIL NFR BLD: 5.1 % (ref 0–8)
ERYTHROCYTE [DISTWIDTH] IN BLOOD BY AUTOMATED COUNT: 15.5 % (ref 11.5–14.5)
EST. GFR  (AFRICAN AMERICAN): >60 ML/MIN/1.73 M^2
EST. GFR  (NON AFRICAN AMERICAN): >60 ML/MIN/1.73 M^2
GLUCOSE SERPL-MCNC: 87 MG/DL (ref 70–110)
HCT VFR BLD AUTO: 28.7 % (ref 37–48.5)
HGB BLD-MCNC: 8.5 G/DL (ref 12–16)
IMM GRANULOCYTES # BLD AUTO: 0.03 K/UL (ref 0–0.04)
IMM GRANULOCYTES NFR BLD AUTO: 0.6 % (ref 0–0.5)
LYMPHOCYTES # BLD AUTO: 1.6 K/UL (ref 1–4.8)
LYMPHOCYTES NFR BLD: 34.4 % (ref 18–48)
MAGNESIUM SERPL-MCNC: 2 MG/DL (ref 1.6–2.6)
MCH RBC QN AUTO: 25.9 PG (ref 27–31)
MCHC RBC AUTO-ENTMCNC: 29.6 G/DL (ref 32–36)
MCV RBC AUTO: 88 FL (ref 82–98)
MONOCYTES # BLD AUTO: 0.5 K/UL (ref 0.3–1)
MONOCYTES NFR BLD: 10.3 % (ref 4–15)
NEUTROPHILS # BLD AUTO: 2.3 K/UL (ref 1.8–7.7)
NEUTROPHILS NFR BLD: 48.8 % (ref 38–73)
NRBC BLD-RTO: 0 /100 WBC
PHOSPHATE SERPL-MCNC: 3.3 MG/DL (ref 2.7–4.5)
PLATELET # BLD AUTO: 304 K/UL (ref 150–450)
PMV BLD AUTO: 11 FL (ref 9.2–12.9)
POTASSIUM SERPL-SCNC: 3.9 MMOL/L (ref 3.5–5.1)
RBC # BLD AUTO: 3.28 M/UL (ref 4–5.4)
SODIUM SERPL-SCNC: 142 MMOL/L (ref 136–145)
WBC # BLD AUTO: 4.74 K/UL (ref 3.9–12.7)

## 2022-07-25 PROCEDURE — 97110 THERAPEUTIC EXERCISES: CPT | Mod: CQ

## 2022-07-25 PROCEDURE — 63600175 PHARM REV CODE 636 W HCPCS: Performed by: NURSE PRACTITIONER

## 2022-07-25 PROCEDURE — 80048 BASIC METABOLIC PNL TOTAL CA: CPT | Performed by: HOSPITALIST

## 2022-07-25 PROCEDURE — 85025 COMPLETE CBC W/AUTO DIFF WBC: CPT | Performed by: HOSPITALIST

## 2022-07-25 PROCEDURE — 25000003 PHARM REV CODE 250: Performed by: NURSE PRACTITIONER

## 2022-07-25 PROCEDURE — 36415 COLL VENOUS BLD VENIPUNCTURE: CPT | Performed by: HOSPITALIST

## 2022-07-25 PROCEDURE — 84100 ASSAY OF PHOSPHORUS: CPT | Performed by: HOSPITALIST

## 2022-07-25 PROCEDURE — 11000004 HC SNF PRIVATE

## 2022-07-25 PROCEDURE — 25000003 PHARM REV CODE 250: Performed by: HOSPITALIST

## 2022-07-25 PROCEDURE — 83735 ASSAY OF MAGNESIUM: CPT | Performed by: HOSPITALIST

## 2022-07-25 PROCEDURE — 97530 THERAPEUTIC ACTIVITIES: CPT | Mod: CQ

## 2022-07-25 RX ADMIN — METHENAMINE HIPPURATE 1 G: 1 TABLET ORAL at 09:07

## 2022-07-25 RX ADMIN — METOPROLOL SUCCINATE 25 MG: 25 TABLET, EXTENDED RELEASE ORAL at 08:07

## 2022-07-25 RX ADMIN — SENNOSIDES AND DOCUSATE SODIUM 1 TABLET: 50; 8.6 TABLET ORAL at 09:07

## 2022-07-25 RX ADMIN — ENOXAPARIN SODIUM 40 MG: 100 INJECTION SUBCUTANEOUS at 05:07

## 2022-07-25 RX ADMIN — OXYCODONE HYDROCHLORIDE 15 MG: 10 TABLET ORAL at 09:07

## 2022-07-25 RX ADMIN — METHENAMINE HIPPURATE 1 G: 1 TABLET ORAL at 08:07

## 2022-07-25 RX ADMIN — Medication 400 MG: at 08:07

## 2022-07-25 RX ADMIN — LIDOCAINE 1 PATCH: 50 PATCH CUTANEOUS at 05:07

## 2022-07-25 RX ADMIN — AMLODIPINE BESYLATE 10 MG: 10 TABLET ORAL at 08:07

## 2022-07-25 RX ADMIN — OXYCODONE HYDROCHLORIDE AND ACETAMINOPHEN 1000 MG: 500 TABLET ORAL at 09:07

## 2022-07-25 RX ADMIN — Medication 400 MG: at 09:07

## 2022-07-25 RX ADMIN — CYANOCOBALAMIN TAB 1000 MCG 1000 MCG: 1000 TAB at 08:07

## 2022-07-25 RX ADMIN — MENTHOL, METHYL SALICYLATE: 10; 15 CREAM TOPICAL at 09:07

## 2022-07-25 RX ADMIN — OXYCODONE HYDROCHLORIDE 10 MG: 10 TABLET ORAL at 09:07

## 2022-07-25 RX ADMIN — DICLOFENAC SODIUM 4 G: 10 GEL TOPICAL at 09:07

## 2022-07-25 RX ADMIN — PANTOPRAZOLE SODIUM 40 MG: 40 TABLET, DELAYED RELEASE ORAL at 08:07

## 2022-07-25 RX ADMIN — OXYCODONE HYDROCHLORIDE 10 MG: 10 TABLET ORAL at 02:07

## 2022-07-25 RX ADMIN — ATORVASTATIN CALCIUM 40 MG: 40 TABLET, FILM COATED ORAL at 05:07

## 2022-07-25 RX ADMIN — SENNOSIDES AND DOCUSATE SODIUM 1 TABLET: 50; 8.6 TABLET ORAL at 08:07

## 2022-07-25 RX ADMIN — MICONAZOLE NITRATE: 20 CREAM TOPICAL at 09:07

## 2022-07-25 RX ADMIN — DICLOFENAC SODIUM 4 G: 10 GEL TOPICAL at 08:07

## 2022-07-25 RX ADMIN — MENTHOL, METHYL SALICYLATE: 10; 15 CREAM TOPICAL at 08:07

## 2022-07-25 RX ADMIN — MICONAZOLE NITRATE: 20 CREAM TOPICAL at 08:07

## 2022-07-25 RX ADMIN — MENTHOL, METHYL SALICYLATE: 10; 15 CREAM TOPICAL at 03:07

## 2022-07-25 NOTE — PLAN OF CARE
Family Training     Patient Name:  Patito Domingo   MRN:  [id number]  Admit Date: 7/12/2022      brendon called to schedule family training this date. Pt's family/caregiver agreeable to attend training on Wed. 7/27 @ 1pm.     7/25/2022

## 2022-07-25 NOTE — PLAN OF CARE
Family Training     Patient Name:  Patito Domingo   MRN:  [id number]  Admit Date: 7/12/2022      Ama called and Spoke with son to set/up a family training he will check with his brother and return as call to set up a FT     7/25/2022

## 2022-07-25 NOTE — PLAN OF CARE
Problem: Adult Inpatient Plan of Care  Goal: Plan of Care Review  Outcome: Ongoing, Progressing  Flowsheets (Taken 7/25/2022 0234)  Plan of Care Reviewed With: patient  Goal: Patient-Specific Goal (Individualized)  Outcome: Ongoing, Progressing  Goal: Absence of Hospital-Acquired Illness or Injury  Outcome: Ongoing, Progressing  Intervention: Identify and Manage Fall Risk  Flowsheets (Taken 7/25/2022 0234)  Safety Promotion/Fall Prevention:   assistive device/personal item within reach   diversional activities provided   Fall Risk reviewed with patient/family   Fall Risk signage in place   family to remain at bedside   lighting adjusted   medications reviewed   nonskid shoes/socks when out of bed   side rails raised x 2   instructed to call staff for mobility  Goal: Optimal Comfort and Wellbeing  Outcome: Ongoing, Progressing  Intervention: Provide Person-Centered Care  Flowsheets (Taken 7/25/2022 0234)  Trust Relationship/Rapport:   care explained   questions encouraged   reassurance provided   choices provided   emotional support provided   thoughts/feelings acknowledged   empathic listening provided   questions answered     Problem: Adjustment to Illness (Sepsis/Septic Shock)  Goal: Optimal Coping  Outcome: Ongoing, Progressing  Intervention: Optimize Psychosocial Adjustment to Illness  Flowsheets (Taken 7/25/2022 0234)  Supportive Measures:   active listening utilized   verbalization of feelings encouraged   positive reinforcement provided  Family/Support System Care:   support provided   self-care encouraged     Problem: Glycemic Control Impaired (Sepsis/Septic Shock)  Goal: Blood Glucose Level Within Desired Range  Outcome: Ongoing, Progressing  Intervention: Optimize Glycemic Control  Flowsheets (Taken 7/25/2022 0234)  Glycemic Management: blood glucose monitored     Problem: Oral Intake Inadequate (Acute Kidney Injury/Impairment)  Goal: Optimal Nutrition Intake  Outcome: Ongoing, Progressing  Intervention:  Promote and Optimize Nutrition  Flowsheets (Taken 7/25/2022 0234)  Oral Nutrition Promotion:   rest periods promoted   safe use of adaptive equipment encouraged     Problem: Fall Injury Risk  Goal: Absence of Fall and Fall-Related Injury  Outcome: Ongoing, Progressing  Intervention: Identify and Manage Contributors  Flowsheets (Taken 7/25/2022 0234)  Self-Care Promotion:   independence encouraged   safe use of adaptive equipment encouraged   BADL personal objects within reach   BADL personal routines maintained  Medication Review/Management: medications reviewed  Intervention: Promote Injury-Free Environment  Flowsheets (Taken 7/25/2022 0234)  Safety Promotion/Fall Prevention:   assistive device/personal item within reach   diversional activities provided   Fall Risk reviewed with patient/family   Fall Risk signage in place   family to remain at bedside   lighting adjusted   medications reviewed   nonskid shoes/socks when out of bed   side rails raised x 2   instructed to call staff for mobility

## 2022-07-25 NOTE — PT/OT/SLP PROGRESS
"Physical Therapy Treatment    Patient Name:  Patito Domingo   MRN:  [id number]  Admit Date: 7/12/2022  Admitting Diagnosis: Bacteremia  Recent Surgeries:     General Precautions: Standard, fall   Orthopedic Precautions:N/A   Braces: N/A     Recommendations:     Discharge Recommendations:  home health PT   Level of Assistance Recommended at Discharge: 24 hours light assistance  Discharge Equipment Recommendations: bedside commode, bath bench, grab bar, rollator, shower chair, walker, rolling, wheelchair   Barriers to discharge: Decreased caregiver support (increased assistance needed)    Assessment:     Patito Domingo is a 71 y.o. female admitted with a medical diagnosis of Bacteremia. Pt tolerated therapy session fairly well, but limited to in room activities due to c/o pain at an 8/10 in sacral region. Pt education on risk of pressure ulcer formation and lying on side/switching positions when lying in bed to alleviate pressure through her backside. Pt session focused on LE strengthening, gait training, bed mob, transfers and sitting balance in order to assist with achieving highest level of function. Pt would continue to benefit from skilled PT services per POC.       Performance deficits affecting function:  weakness, impaired endurance, impaired self care skills, impaired functional mobility, gait instability, impaired balance, pain, impaired skin, impaired cardiopulmonary response to activity .    Rehab Potential is good    Activity Tolerance: Fair    Plan:     Patient to be seen 6 x/week to address the above listed problems via gait training, therapeutic activities, therapeutic exercises, neuromuscular re-education, wheelchair management/training    · Plan of Care Expires: 08/02/22  · Plan of Care Reviewed with: patient    Subjective     Pt agreeable to PT. "I am in a lot of pain, I don't think I want to go out of my room for therapy."     Pain/Comfort:  · Pain Rating 1: 8/10  · Location - Side 1: " Right  · Location - Orientation 1: generalized  · Location 1: sacral spine  · Pain Addressed 1: Reposition, Distraction, Nurse notified  · Pain Rating Post-Intervention 1: 8/10    Patient's cultural, spiritual, Zoroastrianism conflicts given the current situation:  · no    Objective:     Patient found HOB elevated with   upon PT entry to room.     Therapeutic Activities and Exercises: Seated LE strength exercises, including: LAQ's, AP, ABD/ADD scissors, marching, ADD pillow squeezes x 15 - 20 reps, each.     Functional Mobility:  · Bed Mobility:     · Scooting: stand by assistance  · Bridging: stand by assistance  · Supine to Sit: minimum assistance  · Sit to Supine: minimum assistance  · Transfers:     · Sit to Stand:  minimum assistance with rolling walker  · Gait: x 40', RW, CGA with focus on erect posture and staying inside the AD.   · Pt left in R sidelying with pillow beneath backside to alleviate pressure.     AM-PAC 6 CLICK MOBILITY  16    Patient left right sidelying with all lines intact and call button in reach.    GOALS:   Multidisciplinary Problems     Physical Therapy Goals        Problem: Physical Therapy    Goal Priority Disciplines Outcome Goal Variances Interventions   Physical Therapy Goal     PT, PT/OT Ongoing, Progressing     Description: Goals to be met in 30 days (2022):     Patient will increase functional independence with mobility by performin. Supine to sit with MInimal Assistance.  2. Sit to supine with MInimal Assistance.  3. Rolling to Left and Right with Minimal Assistance.  4. Sit to stand transfer with Contact Guard Assistance. met  5. Bed to chair transfer with Minimal Assistance using Rolling Walker -met  Updated 2022: Bed to chair transfer with CGA using Rolling Walker.    6. Gait x 50 feet with Minimal Assistance using Rolling Walker. met  7. Wheelchair propulsion x 50 feet with Stand-by Assistance using bilateral upper extremities. met  8. Ascend/Descend 4 inch  curb step with Moderate Assistance using Rolling Walker. met    9. Lower extremity exercise program x20 reps per handout, with supervision. -met  Updated 07/21/2022: Lower extremity exercise program x30 reps per handout, with supervision.                     Time Tracking:     PT Received On: 07/25/22  PT Start Time: 1311  PT Stop Time: 1335  PT Total Time (min): 24 min    Billable Minutes: Therapeutic Activity 14 and Therapeutic Exercise 10    Treatment Type: Treatment  PT/PTA: PTA     PTA Visit Number: 3     07/25/2022

## 2022-07-26 PROCEDURE — 63600175 PHARM REV CODE 636 W HCPCS: Performed by: NURSE PRACTITIONER

## 2022-07-26 PROCEDURE — 25000003 PHARM REV CODE 250: Performed by: NURSE PRACTITIONER

## 2022-07-26 PROCEDURE — 97530 THERAPEUTIC ACTIVITIES: CPT | Mod: CQ

## 2022-07-26 PROCEDURE — 11000004 HC SNF PRIVATE

## 2022-07-26 PROCEDURE — 97535 SELF CARE MNGMENT TRAINING: CPT | Mod: CO

## 2022-07-26 PROCEDURE — 25000003 PHARM REV CODE 250: Performed by: HOSPITALIST

## 2022-07-26 RX ADMIN — OXYCODONE HYDROCHLORIDE 10 MG: 10 TABLET ORAL at 12:07

## 2022-07-26 RX ADMIN — OXYCODONE HYDROCHLORIDE AND ACETAMINOPHEN 1000 MG: 500 TABLET ORAL at 08:07

## 2022-07-26 RX ADMIN — MICONAZOLE NITRATE: 20 CREAM TOPICAL at 08:07

## 2022-07-26 RX ADMIN — MICONAZOLE NITRATE: 20 CREAM TOPICAL at 09:07

## 2022-07-26 RX ADMIN — DICLOFENAC SODIUM 4 G: 10 GEL TOPICAL at 08:07

## 2022-07-26 RX ADMIN — METHENAMINE HIPPURATE 1 G: 1 TABLET ORAL at 09:07

## 2022-07-26 RX ADMIN — AMLODIPINE BESYLATE 10 MG: 10 TABLET ORAL at 08:07

## 2022-07-26 RX ADMIN — MENTHOL, METHYL SALICYLATE: 10; 15 CREAM TOPICAL at 08:07

## 2022-07-26 RX ADMIN — PANTOPRAZOLE SODIUM 40 MG: 40 TABLET, DELAYED RELEASE ORAL at 08:07

## 2022-07-26 RX ADMIN — METHENAMINE HIPPURATE 1 G: 1 TABLET ORAL at 08:07

## 2022-07-26 RX ADMIN — Medication 400 MG: at 08:07

## 2022-07-26 RX ADMIN — ATORVASTATIN CALCIUM 40 MG: 40 TABLET, FILM COATED ORAL at 05:07

## 2022-07-26 RX ADMIN — CYANOCOBALAMIN TAB 1000 MCG 1000 MCG: 1000 TAB at 08:07

## 2022-07-26 RX ADMIN — LIDOCAINE 1 PATCH: 50 PATCH CUTANEOUS at 05:07

## 2022-07-26 RX ADMIN — OXYCODONE HYDROCHLORIDE 10 MG: 10 TABLET ORAL at 06:07

## 2022-07-26 RX ADMIN — MENTHOL, METHYL SALICYLATE: 10; 15 CREAM TOPICAL at 03:07

## 2022-07-26 RX ADMIN — OXYCODONE HYDROCHLORIDE 15 MG: 10 TABLET ORAL at 10:07

## 2022-07-26 RX ADMIN — MENTHOL, METHYL SALICYLATE: 10; 15 CREAM TOPICAL at 09:07

## 2022-07-26 RX ADMIN — METOPROLOL SUCCINATE 25 MG: 25 TABLET, EXTENDED RELEASE ORAL at 08:07

## 2022-07-26 RX ADMIN — ENOXAPARIN SODIUM 40 MG: 100 INJECTION SUBCUTANEOUS at 05:07

## 2022-07-26 RX ADMIN — OXYCODONE HYDROCHLORIDE AND ACETAMINOPHEN 1000 MG: 500 TABLET ORAL at 09:07

## 2022-07-26 RX ADMIN — DICLOFENAC SODIUM 4 G: 10 GEL TOPICAL at 09:07

## 2022-07-26 RX ADMIN — Medication 400 MG: at 09:07

## 2022-07-26 RX ADMIN — SENNOSIDES AND DOCUSATE SODIUM 1 TABLET: 50; 8.6 TABLET ORAL at 09:07

## 2022-07-26 RX ADMIN — OXYCODONE HYDROCHLORIDE 15 MG: 10 TABLET ORAL at 05:07

## 2022-07-26 RX ADMIN — SENNOSIDES AND DOCUSATE SODIUM 1 TABLET: 50; 8.6 TABLET ORAL at 08:07

## 2022-07-26 NOTE — PROGRESS NOTES
[facility]                                  Skilled Nursing Facility                   Progress Note     Admit Date: 7/12/2022  JULIO C TBD  Principal Problem:  Bacteremia   HPI obtained from patient interview and chart review     Chief Complaint: Re-evaluation of medical treatment and therapy status: Lab review    HPI:   Mrs. 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  Admission to SNF for secondary weakness and debility.     Interval history:  All of today's labs reviewed and are listed below.  24 hr vital sign ranges listed below.  Urine culture evaluated.  Patient denies shortness of breath, abdominal discomfort, nausea, or vomiting.  Patient reports an adequate appetite.  Patient denies dysuria.  Patient reports having regular bowel movements.  Patient progessing with PT/OT-Gait: x 40', RW, CGA with focus on erect posture and staying inside the AD. Continuing to follow and treat all acute and chronic conditions.    Past Medical History: Patient has a past medical history of Allergy, Arthritis, Cataract, Chronic diastolic (congestive) heart failure, Colon cancer, Encounter for blood transfusion, History of ESBL E. 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    Past Surgical History: Patient has a past surgical history that includes Liver biopsy (9/14); Lithotripsy; cystoscope; Hysterectomy (5/1996); Colon surgery; Eye surgery; Cataract extraction (Left, 10/2017); Cholecystectomy; Cystoscopy w/ retrogrades (Right, 10/10/2019); Ureteroscopy (Right, 10/10/2019);   section; Uterine fibroid surgery; Abdominal surgery; ERCP (N/A, 2021); and ERCP (N/A, 3/4/2022).    Social History: Patient reports that she has never smoked. She has never used smokeless tobacco. She reports that she does not drink alcohol and does not use drugs.    Family History: family history includes Alzheimer's disease in her father; Cancer in her mother; No Known Problems in her son, son, son, and son; Stroke in her sister.    Allergies: Patient is allergic to contrast media, epinephrine, ibuprofen, zofran [ondansetron hcl], iodinated contrast media, morphine, and sulfa (sulfonamide antibiotics).    ROS  Constitutional: Negative for fever   Eyes: Negative for blurred vision, double vision   Respiratory: Negative for cough, shortness of breath   Cardiovascular: Negative for chest pain, palpitations, and leg swelling.   Gastrointestinal: Negative for abdominal pain, constipation, diarrhea, nausea, vomiting.   Genitourinary: neg for dysuria, frequency   Musculoskeletal:  + generalized weakness.  +RLE pain  Skin: Negative for itching and rash.   Neurological: Negative for dizziness, headaches.   Psychiatric/Behavioral: Negative for depression. The patient is not nervous/anxious.      24 hour Vital Sign Range   Temp:  [97.6 °F (36.4 °C)-98.3 °F (36.8 °C)]   Pulse:  [80-81]   Resp:  [18]   BP: (120-132)/(56-61)   SpO2:  [94 %-96 %]     PEx  Constitutional: Patient appears debilitated.  No distress noted  HENT:   Head: Normocephalic and atraumatic.   Eyes: Pupils are equal, round  Neck: Normal range of motion. Neck supple.   Cardiovascular: Normal rate, regular rhythm and normal heart sounds.    Pulmonary/Chest: Effort normal and breath sounds are clear  Abdominal: Soft. Bowel sounds are normal.   Musculoskeletal: Normal range of motion.   Neurological: Alert and oriented to person, place, and time.   Psychiatric: Normal mood and affect. Behavior is normal.   Skin: Skin is warm and dry.      Altered  Skin Integrity 07/07/22 1104 Right Buttocks Shearing Partial thickness tissue loss. Shallow open ulcer with a red or pink wound bed, without slough. Intact or Open/Ruptured Serum-filled blister.   Date First Assessed/Time First Assessed: 07/07/22 1104   Altered Skin Integrity Present on Admission: suspected hospital acquired  Side: Right  Location: Buttocks  Is this injury device related?: No  Primary Wound Type: Shearing  Description of Altere...   Dressing Appearance Intact;Moist drainage   Drainage Amount Scant   Drainage Characteristics/Odor Serosanguineous;Creamy   Appearance Red;Tan;Slough;Moist   Tissue loss description Partial thickness   Periwound Area Intact;Moist   Wound Edges Open;Jagged;Irregular   Wound Length (cm) 5 cm   Wound Width (cm) 5 cm   Wound Depth (cm) 0.2 cm   Wound Volume (cm^3) 5 cm^3   Wound Surface Area (cm^2) 25 cm^2   Care Cleansed with:;Sterile normal saline;Applied:;Skin Barrier   Dressing Removed;Island/border;Changed   Skin Interventions   Device Skin Pressure Protection absorbent pad utilized/changed;positioning supports utilized;pressure points protected   Pressure Reduction Devices positioning supports utilized;pressure-redistributing mattress utilized   Pressure Reduction Techniques frequent weight shift encouraged   Skin Protection incontinence pads utilized;skin sealant/moisture barrier applied         Recent Labs   Lab 07/25/22  0442      K 3.9      CO2 27   BUN 21   CREATININE 0.8   MG 2.0       Recent Labs   Lab 07/25/22  0443   WBC 4.74   RBC 3.28*   HGB 8.5*   HCT 28.7*      MCV 88   MCH 25.9*   MCHC 29.6*         Assessment and Plan:    Dysuria  - checking UA with reflex as patient has recently completed IV antibiotics for UTI.  - improved,  UA revealed 2+ leukocytes, 67 WBC with occasional bacteria.  Patient states dysuria better today, will await finalized culture to see if any organism grows and will treat from there.    - dysuria resolved, urine  culture with multiple organisms not and predominance    Moisture associated dermatitis  - miconazole ointment BID to perineal area    RLE pain  - previous ultrasound negative for DVT  - improved, continue oxycodone 10 mg or 15mg  q.4 hours PRN,  BenGay topical cream TID    Hypomagnesemia  - magnesium oxide 400 mg BID     Bacteremia  UTI   - presence of large bilateral renal stones complicates picture, although non-obstructive  - bilateral DVT US is negative  - repeat blood cx -ngtd  - continue CTX 1g IV q 24H - will need 2 weeks from negative cx - start date is 7/2- end date is 7/18  - possible that fever is related to carcinoid      NSTEMI (non-ST elevated myocardial infarction)  - Trop trending down. No EKG changes or c/o chest pain  - TTE reviewed.  - follow-up with Cardiology as outpatient     Chronic diastolic (congestive) heart failure  - No signs of decompensation  - Cont metoprolol  - TTE showing grade I diastolic dysfunction  - Monitor I&O      HLD (hyperlipidemia)  - Continue atorvastatin      Anemia of chronic disease  - continue monitor twice weekly CBCs, transfuse for hemoglobin < 7    Essential hypertension  - continue amlodipine 10 mg daily, metoprolol XL 25 mg daily.  Home losartan on hold due to previous FLORECITA    Metastatic carcinoid tumor  - ER physician discussed the case with Dr. Perez with neuroendocrine services  - Continue follow up outpatient as scheduled.  Not receiving any further treatment      Neuropathy pain  - continue gabapentin 30 mg qHS.    Pall[place] care encounter  [place] [facility] [place]  - Pt elects to remain full code/full treatment status  - She does not have advance directives.  She is  and has 4 sons. If she becomes unable to make medical decisions for herself, she would want them to share surrogate decision making responsibility  - Her primary goal at this time is to regain strength to be able to go home, although she does mention that she is lonely at  home.She understands that she is high-risk for readmission based on hospitalizations required over the last 6 months but feels that hospital encounters are not affecting her quality of life.  She is satisfied with home-based palliative care and would like for that to continue after SNF.   - Her goals include to regain strength and to extend life regardless of treatment burden. She confirms that she does want to continue cancer treatments.     Debility   - Continue with PT/OT for gait training and strengthening and restoration of ADL's   - Encourage mobility, OOB in chair, and early ambulation as appropriate  - Fall precautions   - Monitor for bowel and bladder dysfunction  - Monitor for and prevent skin breakdown and pressure ulcers  - continue DVT prophylaxis with  Lovenox      Anticipate disposition:  Home with home health      Follow-up needed during SNF stay-    Appointment to send patient to- home visit on 07/15    Follow-up needed after discharge from SNF:   - PCP, hospital follow-up  - neuroendocrine service  - Cardiology    Future Appointments   Date Time Provider Department Center   9/1/2022  9:20 AM Ervin Parikh MD [facility] UROLOGY [facility]   9/21/2022 10:40 AM DO TARAH SavageCTKADI Canas NP  Department of Hospital Medicine   [facility]     DOS: 7/25/2022       Patient note was created using MModal Dictation.  Any errors in syntax or even information may not have been identified and edited on initial review prior to signing this note.

## 2022-07-26 NOTE — PT/OT/SLP PROGRESS
Occupational Therapy   Treatment    Name: Patito Domingo  MRN: [id number]  Admit Date: 7/12/2022  Admitting Diagnosis:  Bacteremia    General Precautions: Standard, fall   Orthopedic Precautions:N/A   Braces:       Recommendations:     Discharge Recommendations: home health OT  Level of Assistance Recommended at Discharge: 24 hours light assistance for ADL's and homemaking tasks  Discharge Equipment Recommendations:  bedside commode, bath bench, grab bar, walker, rolling, wheelchair  Barriers to discharge:  Decreased caregiver support    Assessment:     Patito Domingo is a 71 y.o. female with a medical diagnosis of Bacteremia She presents with  Performance deficits affecting function are are weakness, impaired endurance, impaired self care skills, impaired functional mobilty, gait instability, pain, edema, decreased lower extremity function.        .   Pt. Was cooperative and participated well with session on this day. Pt. Still continues to complaint of discomfort in buttox area with prolonged  sitting Pt  continues to demonstrate levels of physical deficits with  functional indep with daily management activities tasks, selfcare skills with balance,  functional mobility, UB strength and endurance. Pt. Will continue to benefit from continued OT to progress towards goals     Rehab Potential is fair    Activity tolerance:  Fair    Plan:     Patient to be seen 6 x/week to address the above listed problems via self-care/home management, therapeutic activities, therapeutic exercises, neuromuscular re-education    · Plan of Care Expires: 08/13/22  · Plan of Care Reviewed with: patient    Subjective     Communicated with: Mike  prior to session.II can't sit to long mu bottom hurts    Pain/Comfort:  Pain Rating 1: 6/10  Location - Side 1: Bilateral  Location - Orientation 1: generalized  Location 1: sacral spine  Pain Addressed 1: Reposition, Distraction, Nurse notified  Pain Rating Post-Intervention 1: 6/10    Patient's  cultural, spiritual, Mormon conflicts given the current situation:  no    Objective:     Patient found HOB elevated   upon OT entry to room.    Bed Mobility:    · Patient completed Scooting/Bridging with stand by assistance  · Patient completed Supine to Sit with stand by assistance     Functional Mobility/Transfers:  · Patient completed Sit <> Stand Transfer with minimum assistance  with  rolling walker   · Patient completed Bed <> Chair Transfer using Stand Pivot technique with minimum assistance with no assistive device  · Patient completed Toilet Transfer Stand Pivot technique with minimum assistance with  no AD    Activities of Daily Living:  · Grooming: supervision at sink level with grooming needs  · Bathing: moderate assistance for BLE's at sink level  · Upper Body Dressing: stand by assistance to doff/lisa pulll over shirt  · Lower Body Dressing: moderate assistance to lisa pants EOB and to mange over hips instance with RW for bal  · Toileting: minimum assistance with cleaning and clothing/diaper management from 3n1 level    [facility] 6 Click ADL: 16    Treatment & Education:  Pt edu on role of OT, POC, safety when performing self care tasks , benefit of performing OOB activity, and safety when performing functional transfers and mobility management for preparation with goals to progress towards next level of care    Patient left up in chair with all lines intact and call button in reachEducation:      GOALS:   Multidisciplinary Problems     Occupational Therapy Goals        Problem: Occupational Therapy    Goal Priority Disciplines Outcome Interventions   Occupational Therapy Goal     OT, PT/OT Ongoing, Progressing    Description: Goals to be met by: 30 days     Patient will increase functional independence with ADLs by performing:    Feeding with Modified Ida.  UE Dressing with Set-up Assistance.  LE Dressing with Minimal Assistance.  Grooming while seated at sink with Modified  [place]ade.  Toileting from toilet or BSC with Minimal Assistance for hygiene and clothing management.   Bathing from  sitting at sink with Minimal Assistance.  Supine to sit with Stand-by Assistance.  Step transfer with Contact Guard Assistance with RW.  Upper extremity exercise with supervision.  Caregiver will be educated on level of assist required to safely perform self care tasks and functional transfers..                      Time Tracking:     OT Date of Treatment: 07/26/22  OT Start Time: 0733    OT Stop Time: 0811  OT Total Time (min): 38 min    Billable Minutes:Self Care/Home Management 38    7/26/2022

## 2022-07-26 NOTE — PT/OT/SLP PROGRESS
Physical Therapy Treatment    Patient Name:  Patito Domingo   MRN:  [id number]  Admit Date: 7/12/2022  Admitting Diagnosis: Bacteremia  Recent Surgeries:     General Precautions: Standard, fall   Orthopedic Precautions:N/A   Braces: N/A     Recommendations:     Discharge Recommendations:  home health PT   Level of Assistance Recommended at Discharge: 24 hours light assistance  Discharge Equipment Recommendations: bedside commode, bath bench, grab bar, rollator, shower chair, walker, rolling, wheelchair   Barriers to discharge: Decreased caregiver support (increased assistance needed)    Assessment:     Patito Domingo is a 71 y.o. female admitted with a medical diagnosis of Bacteremia. Pt tolerated therapy session fairly well, but limited due to c/o 8/10 pain in sacrum and statements that she has been having diarrhea, ending therapy session quickly due to fear of having another bout of diarrhea. Session focused on increasing independence with gait training, curb, transfers, w/c mob, LE strengthening and safety awareness in order to assist with achieving highest level of function. Pt would continue to benefit from skilled PT services per POC.      Performance deficits affecting function:  weakness, impaired endurance, impaired self care skills, impaired functional mobility, gait instability, impaired balance, pain, impaired skin, impaired cardiopulmonary response to activity .    Rehab Potential is good    Activity Tolerance: Good and Fair    Plan:     Patient to be seen 6 x/week to address the above listed problems via gait training, therapeutic activities, therapeutic exercises, neuromuscular re-education, wheelchair management/training    · Plan of Care Expires: 08/02/22  · Plan of Care Reviewed with: patient    Subjective     Pt agreeable to PT.     Pain/Comfort:  · Pain Rating 1: 8/10  · Location - Side 1: Bilateral  · Location - Orientation 1: generalized  · Location 1: sacral spine  · Pain Addressed 1:  "Pre-medicate for activity, Reposition, Cessation of Activity  · Pain Rating Post-Intervention 1: 8/10    Patient's cultural, spiritual, Christian conflicts given the current situation:  · no    Objective:     Communicated with PCT prior to session.  Patient found up in chair with   upon PT entry to room.     Therapeutic Activities and Exercises: Seated LE strength exercises, including: LAQ's, AP, ABD/ADD scissors, marching, ADD ball squeezes x 20 reps, each.     Functional Mobility:  · Transfers:     · Sit to Stand: 2 sets, contact guard assistance with rolling walker. Pt requires education and reminders on locking brakes.   · Gait: x 53', x 50', RW, CGA with focus on erect posture.  · Stairs:  Pt ascended/descended 4" curb step with Rolling Walker with no handrails with Contact Guard Assistance. Pt education on importance of practicing curb step for being out in the community. Pt did not want to participate in activity at first, stating she doesn't have a curb step at home, but then became agreeable.  · Wheelchair Propulsion:  Pt propelled Standard wheelchair x 56 feet on Level tile with  Bilateral upper extremity with Stand-by Assistance.     AM-PAC 6 CLICK MOBILITY  16    Patient left up in chair with call button in reach.    GOALS:   Multidisciplinary Problems     Physical Therapy Goals        Problem: Physical Therapy    Goal Priority Disciplines Outcome Goal Variances Interventions   Physical Therapy Goal     PT, PT/OT Ongoing, Progressing     Description: Goals to be met in 30 days (2022):     Patient will increase functional independence with mobility by performin. Supine to sit with MInimal Assistance.  2. Sit to supine with MInimal Assistance.  3. Rolling to Left and Right with Minimal Assistance.  4. Sit to stand transfer with Contact Guard Assistance. met  5. Bed to chair transfer with Minimal Assistance using Rolling Walker -met  Updated 2022: Bed to chair transfer with CGA using " Rolling Walker.    6. Gait x 50 feet with Minimal Assistance using Rolling Walker. met  7. Wheelchair propulsion x 50 feet with Stand-by Assistance using bilateral upper extremities. met  8. Ascend/Descend 4 inch curb step with Moderate Assistance using Rolling Walker. met    9. Lower extremity exercise program x20 reps per handout, with supervision. -met  Updated 07/21/2022: Lower extremity exercise program x30 reps per handout, with supervision.                     Time Tracking:     PT Received On: 07/26/22  PT Start Time: 0837  PT Stop Time: 0858  PT Total Time (min): 21 min    Billable Minutes: Therapeutic Activity 21    Treatment Type: Treatment  PT/PTA: PTA     PTA Visit Number: 4     07/26/2022

## 2022-07-26 NOTE — PLAN OF CARE
Problem: Adult Inpatient Plan of Care  Goal: Plan of Care Review  Outcome: Ongoing, Progressing  Flowsheets (Taken 7/26/2022 0438)  Plan of Care Reviewed With: patient  Goal: Patient-Specific Goal (Individualized)  Outcome: Ongoing, Progressing  Goal: Absence of Hospital-Acquired Illness or Injury  Outcome: Ongoing, Progressing  Intervention: Identify and Manage Fall Risk  Flowsheets (Taken 7/26/2022 0438)  Safety Promotion/Fall Prevention:   assistive device/personal item within reach   diversional activities provided   Fall Risk reviewed with patient/family   Fall Risk signage in place   lighting adjusted   medications reviewed   side rails raised x 2   instructed to call staff for mobility  Goal: Optimal Comfort and Wellbeing  Outcome: Ongoing, Progressing  Intervention: Provide Person-Centered Care  Flowsheets (Taken 7/26/2022 0438)  Trust Relationship/Rapport:   care explained   questions encouraged   choices provided   reassurance provided   emotional support provided   thoughts/feelings acknowledged   empathic listening provided   questions answered     Problem: Adjustment to Illness (Sepsis/Septic Shock)  Goal: Optimal Coping  Outcome: Ongoing, Progressing  Intervention: Optimize Psychosocial Adjustment to Illness  Flowsheets (Taken 7/26/2022 0438)  Supportive Measures:   active listening utilized   self-care encouraged   relaxation techniques promoted   verbalization of feelings encouraged  Family/Support System Care:   support provided   self-care encouraged     Problem: Infection Progression (Sepsis/Septic Shock)  Goal: Absence of Infection Signs and Symptoms  Outcome: Ongoing, Progressing  Intervention: Promote Recovery  Flowsheets (Taken 7/26/2022 0438)  Sleep/Rest Enhancement:   awakenings minimized   regular sleep/rest pattern promoted   relaxation techniques promoted  Airway/Ventilation Support: comfort measures provided     Problem: Fluid and Electrolyte Imbalance (Acute Kidney  Injury/Impairment)  Goal: Fluid and Electrolyte Balance  Outcome: Ongoing, Progressing  Intervention: Monitor and Manage Fluid and Electrolyte Balance  Flowsheets (Taken 7/26/2022 0438)  Fluid/Electrolyte Management: fluids provided     Problem: Infection  Goal: Absence of Infection Signs and Symptoms  Outcome: Ongoing, Progressing  Intervention: Prevent or Manage Infection  Flowsheets (Taken 7/26/2022 0438)  Infection Management: aseptic technique maintained     Problem: Fall Injury Risk  Goal: Absence of Fall and Fall-Related Injury  Outcome: Ongoing, Progressing  Intervention: Identify and Manage Contributors  Flowsheets (Taken 7/26/2022 0438)  Self-Care Promotion:   independence encouraged   safe use of adaptive equipment encouraged   BADL personal objects within reach   BADL personal routines maintained  Medication Review/Management: medications reviewed

## 2022-07-26 NOTE — TREATMENT PLAN
"Rehab Services' DME recommendations    Patito Domingo  MRN: [id number]       [x] Walker Adult (5'4"-6"6")    Accessories N/A    Wheels Yes       [x] Wheelchair  Number of hours up in a wheelchair per day 4-8 hrs      Style Light weight        Justification for light weight w/c: physical activity/exercise    Seat Width 20 (patient requesting to go up a size from her 18" used at facility)    Seat Depth 20    Back Height Standard    Leg Support Standard, Heel Loops and Swing Away    Arm Height Detachable and Full    Lap Belt Velcro    Accessories Front Brakes, Anti-tippers and Safety belt    Cushion Basic    Justification for Cushion pressure ulcer risk prevention    Justification for wheelchair order: (Please select all that apply) Caregiver is capable and willing to operate wheelchair safely, Patient's upper body strength is sufficient for propulsion, The patient requires the use of a wheelchair for ADLs within the home and Patient mobility limitations cannot be sufficiently resolved by the use of other ambulatory therapies      [x] 3 in 1 commode Standard      [x] Tub bench Standard (unpadded)     [x] Shower Chair Without back     [x] Other grab bar, 4WW/rollator    [x] Home health PT, OT and Aide      Chely Alicea, PTA 7/26/2022         "

## 2022-07-27 PROCEDURE — 11000004 HC SNF PRIVATE

## 2022-07-27 PROCEDURE — 97530 THERAPEUTIC ACTIVITIES: CPT

## 2022-07-27 PROCEDURE — 25000003 PHARM REV CODE 250: Performed by: NURSE PRACTITIONER

## 2022-07-27 PROCEDURE — 97116 GAIT TRAINING THERAPY: CPT

## 2022-07-27 PROCEDURE — 97535 SELF CARE MNGMENT TRAINING: CPT

## 2022-07-27 PROCEDURE — 25000003 PHARM REV CODE 250: Performed by: HOSPITALIST

## 2022-07-27 PROCEDURE — 63600175 PHARM REV CODE 636 W HCPCS: Performed by: NURSE PRACTITIONER

## 2022-07-27 PROCEDURE — 97110 THERAPEUTIC EXERCISES: CPT

## 2022-07-27 RX ADMIN — MICONAZOLE NITRATE: 20 CREAM TOPICAL at 10:07

## 2022-07-27 RX ADMIN — AMLODIPINE BESYLATE 10 MG: 10 TABLET ORAL at 09:07

## 2022-07-27 RX ADMIN — ENOXAPARIN SODIUM 40 MG: 100 INJECTION SUBCUTANEOUS at 05:07

## 2022-07-27 RX ADMIN — METOPROLOL SUCCINATE 25 MG: 25 TABLET, EXTENDED RELEASE ORAL at 09:07

## 2022-07-27 RX ADMIN — SENNOSIDES AND DOCUSATE SODIUM 1 TABLET: 50; 8.6 TABLET ORAL at 09:07

## 2022-07-27 RX ADMIN — LIDOCAINE 1 PATCH: 50 PATCH CUTANEOUS at 05:07

## 2022-07-27 RX ADMIN — PANTOPRAZOLE SODIUM 40 MG: 40 TABLET, DELAYED RELEASE ORAL at 09:07

## 2022-07-27 RX ADMIN — MENTHOL, METHYL SALICYLATE: 10; 15 CREAM TOPICAL at 03:07

## 2022-07-27 RX ADMIN — OXYCODONE HYDROCHLORIDE 15 MG: 10 TABLET ORAL at 05:07

## 2022-07-27 RX ADMIN — OXYCODONE HYDROCHLORIDE 15 MG: 10 TABLET ORAL at 11:07

## 2022-07-27 RX ADMIN — METHENAMINE HIPPURATE 1 G: 1 TABLET ORAL at 10:07

## 2022-07-27 RX ADMIN — OXYCODONE HYDROCHLORIDE 15 MG: 10 TABLET ORAL at 06:07

## 2022-07-27 RX ADMIN — OXYCODONE HYDROCHLORIDE AND ACETAMINOPHEN 1000 MG: 500 TABLET ORAL at 09:07

## 2022-07-27 RX ADMIN — MENTHOL, METHYL SALICYLATE: 10; 15 CREAM TOPICAL at 09:07

## 2022-07-27 RX ADMIN — OXYCODONE HYDROCHLORIDE AND ACETAMINOPHEN 1000 MG: 500 TABLET ORAL at 10:07

## 2022-07-27 RX ADMIN — ATORVASTATIN CALCIUM 40 MG: 40 TABLET, FILM COATED ORAL at 05:07

## 2022-07-27 RX ADMIN — CYANOCOBALAMIN TAB 1000 MCG 1000 MCG: 1000 TAB at 09:07

## 2022-07-27 RX ADMIN — MICONAZOLE NITRATE: 20 CREAM TOPICAL at 09:07

## 2022-07-27 RX ADMIN — DICLOFENAC SODIUM 4 G: 10 GEL TOPICAL at 09:07

## 2022-07-27 RX ADMIN — METHENAMINE HIPPURATE 1 G: 1 TABLET ORAL at 09:07

## 2022-07-27 RX ADMIN — SENNOSIDES AND DOCUSATE SODIUM 1 TABLET: 50; 8.6 TABLET ORAL at 10:07

## 2022-07-27 RX ADMIN — Medication 400 MG: at 10:07

## 2022-07-27 RX ADMIN — Medication 400 MG: at 09:07

## 2022-07-27 NOTE — PLAN OF CARE
Family Training     Patient Name:  Patito Domingo   MRN:  [id number]  Admit Date: 7/12/2022      Family training completed on today; no concerns reported per family member.      7/27/2022

## 2022-07-27 NOTE — PT/OT/SLP PROGRESS
Physical Therapy Treatment    Patient Name:  Patito Domingo   MRN:  [id number]  Admit Date: 7/12/2022  Admitting Diagnosis: Bacteremia  Recent Surgeries: N/A    General Precautions: Standard, fall   Orthopedic Precautions:N/A   Braces: N/A     Recommendations:     Discharge Recommendations:  home health PT   Level of Assistance Recommended at Discharge: Intermittent assistance   Discharge Equipment Recommendations: bedside commode, bath bench, grab bar, rollator, shower chair, walker, rolling, wheelchair   Barriers to discharge: Decreased caregiver support (increased assistance needed)    Assessment:     Patito Domingo is a 72 y.o. female admitted with a medical diagnosis of Bacteremia . FT completed with pt's son. Pt's son observed pt complete GT and curb step negotiation, and PT educated son on pt needing (S) for transfers and would progress to Mod I by the time of her d/c.pt however would need SBA/CGA for GT in the house initially and would recommend pt complete all ADL activity in the w/c at home and limit GT when family and HH is present. Pt's son understood and had no additional questions for PT.     Performance deficits affecting function:  weakness, impaired endurance, impaired self care skills, impaired functional mobility, gait instability, impaired balance, decreased lower extremity function, decreased upper extremity function, impaired cardiopulmonary response to activity .    Rehab Potential is good    Activity Tolerance: Good    Plan:     Patient to be seen 6 x/week to address the above listed problems via gait training, therapeutic activities, therapeutic exercises, neuromuscular re-education, wheelchair management/training    · Plan of Care Expires: 08/02/22  · Plan of Care Reviewed with: patient    Subjective     Pt. Agreeable to work with PT.     Pain/Comfort:  · Pain Rating 1: 0/10  · Pain Rating Post-Intervention 1: 0/10    Patient's cultural, spiritual, Zoroastrian conflicts given the current  "situation:  · no    Objective:     Communicated with pt's son prior to session.  Patient found up in chair with   upon PT entry to room.     Therapeutic Activities and Exercises: Mini-eliptical x 10mins set at medium resistance, to help improve B L/E MMT and endurance.    Functional Mobility:  · Bed Mobility:     · Sit to Supine: minimum assistance  · Transfers:     · Sit to Stand:  stand by assistance with no AD and rolling walker  · Bed to Chair: stand by assistance with  no AD  using  Stand Pivot  · Gait: ~150ft with RW and SBA for safety 2* to pt with FFT. pt however with no LOB episodes and did not need a sitting/standing rest break.  · Stairs:  Pt ascended/descended 4" curb step with Rolling Walker with no handrails with Minimal Assistance.   · Wheelchair Propulsion:  Pt propelled Standard wheelchair x 130 feet on Level tile with  Bilateral upper extremity with Supervision or Set-up Assistance.     AM-PAC 6 CLICK MOBILITY  17    Patient left up in chair with call button in reach.    GOALS:   Multidisciplinary Problems     Physical Therapy Goals        Problem: Physical Therapy    Goal Priority Disciplines Outcome Goal Variances Interventions   Physical Therapy Goal     PT, PT/OT Ongoing, Progressing     Description: Goals to be met in 30 days (2022):     Patient will increase functional independence with mobility by performin. Supine to sit with MInimal Assistance.  2. Sit to supine with MInimal Assistance.  3. Rolling to Left and Right with Minimal Assistance.  4. Sit to stand transfer with Contact Guard Assistance. Met  Updated 2022 Sit to stand transfer with (I)  5. Bed to chair transfer with Minimal Assistance using Rolling Walker -met  Updated 2022: Bed to chair transfer with CGA using Rolling Walker.-met  Updated 2022 Bed to chair transfer with (I)  6. Gait x 50 feet with Minimal Assistance using Rolling Walker. Met  Updated 2022  Gait x 150 feet with SBA/(S) using " Rolling Walker.  7. Wheelchair propulsion x 50 feet with Stand-by Assistance using bilateral upper extremities. Met  Updated 7/27/2022 Wheelchair propulsion x 100 feet with Mod I using bilateral upper extremities  8. Ascend/Descend 4 inch curb step with Moderate Assistance using Rolling Walker. Met  Updated 7/27/2022 Ascend/Descend 4 inch curb step with SBAusing Rolling Walker  9. Lower extremity exercise program x20 reps per handout, with supervision. -met  Updated 07/21/2022: Lower extremity exercise program x30 reps per handout, with supervision.                     Time Tracking:     PT Received On: 07/27/22  PT Start Time: 0915  PT Stop Time: 0937  PT Total Time (min): 22 min     PM: 12mins GT    Billable Minutes: Gait Training 12mins and Therapeutic Activity 22mins    Treatment Type: Treatment  PT/PTA: PT     PTA Visit Number: 0     07/27/2022

## 2022-07-27 NOTE — PLAN OF CARE
Problem: Adult Inpatient Plan of Care  Goal: Plan of Care Review  Outcome: Ongoing, Progressing  Goal: Patient-Specific Goal (Individualized)  Outcome: Ongoing, Progressing     Problem: Adjustment to Illness (Sepsis/Septic Shock)  Goal: Optimal Coping  Outcome: Ongoing, Progressing     Problem: Bleeding (Sepsis/Septic Shock)  Goal: Absence of Bleeding  Outcome: Ongoing, Progressing     Problem: Glycemic Control Impaired (Sepsis/Septic Shock)  Goal: Blood Glucose Level Within Desired Range  Outcome: Ongoing, Progressing     Problem: Infection Progression (Sepsis/Septic Shock)  Goal: Absence of Infection Signs and Symptoms  Outcome: Ongoing, Progressing     Problem: Nutrition Impaired (Sepsis/Septic Shock)  Goal: Optimal Nutrition Intake  Outcome: Ongoing, Progressing     Problem: Fluid and Electrolyte Imbalance (Acute Kidney Injury/Impairment)  Goal: Fluid and Electrolyte Balance  Outcome: Ongoing, Progressing     Problem: Oral Intake Inadequate (Acute Kidney Injury/Impairment)  Goal: Optimal Nutrition Intake  Outcome: Ongoing, Progressing     Problem: Renal Function Impairment (Acute Kidney Injury/Impairment)  Goal: Effective Renal Function  Outcome: Ongoing, Progressing     Problem: Infection  Goal: Absence of Infection Signs and Symptoms  Outcome: Ongoing, Progressing

## 2022-07-27 NOTE — PLAN OF CARE
[facility] scheduled pcp f/u appt   Future Appointments   Date Time Provider Department Center   8/16/2022 11:20 AM DO [facility]   9/1/2022  9:20 AM Ervin Parikh MD [facility] UROLOGY [facility]   9/21/2022 10:40 AM DO [facility]

## 2022-07-27 NOTE — PT/OT/SLP PROGRESS
"Occupational Therapy   Treatment    Name: Patito Domingo  MRN: [id number]  Admit Date: 7/12/2022  Admitting Diagnosis:  Bacteremia    General Precautions: Standard, fall   Orthopedic Precautions:N/A   Braces: N/A     Recommendations:     Discharge Recommendations: home health OT  Level of Assistance Recommended at Discharge: 24 hours light assistance for ADL's and homemaking tasks  Discharge Equipment Recommendations:  bedside commode, bath bench, grab bar, walker, rolling, wheelchair  Barriers to discharge:  Decreased caregiver support    Assessment:     Patito Domingo is a 72 y.o. female with a medical diagnosis of Bacteremia.  Pt tolerated session well and without incident despite significant sacral pain.  Family training with pt and her son focused on transfers to the toilet and how to safely perform self-care tasks in her home.  She presents with the following.  Performance deficits affecting function are weakness, impaired endurance, impaired self care skills, impaired functional mobility, gait instability, impaired balance, pain, impaired skin.     Rehab Potential is good    Activity tolerance:  Good    Plan:     Patient to be seen 6 x/week to address the above listed problems via self-care/home management, therapeutic activities, therapeutic exercises, neuromuscular re-education    · Plan of Care Expires: 08/13/22  · Plan of Care Reviewed with: patient, son    Subjective   "My bottom hurts."  Communicated with: nursing prior to session.    Pain/Comfort:  Pain Rating 1: 9/10  Location - Side 1: Bilateral  Location - Orientation 1: generalized  Location 1: sacral spine  Pain Addressed 1: Reposition, Distraction  Pain Rating Post-Intervention 1: other (see comments) (not rated)    Patient's cultural, spiritual, Hinduism conflicts given the current situation:  no    Objective:     Patient found HOB elevated with her RLE hanging off the EOB for pressure relief with Other (comments) (no active lines) upon OT " entry to room.    Bed Mobility:    · Patient completed Rolling/Turning to Right with stand by assistance  · Patient completed Scooting/Bridging with contact guard assistance  · Patient completed Supine to Sit with contact guard assistance     Functional Mobility/Transfers:  · Patient completed Sit <> Stand Transfer from EOB, w/c, 3-in-1 commode, and shower bench x 1 trial each with contact guard assistance with rolling walker and grab bars for toilet and shower bench  · Patient completed Bed <> Chair Transfer using Step Transfer technique with contact guard assistance with rolling walker  · Pt completed 3-in-1 Commode over the toilet t/f using step t/f with CGA with RW and grab bar.  · Pt completed simulated Tub t/f to and from shower bench with Min A with RW and grab bar with (A) to lift her legs over simulated tub   · Functional Mobility: Pt ambulated ~8 ft from EOB to the w/c with CGA with RW.   She then ambulated ~10 ft x 2 trials from w/c to the toilet, to the shower bench, and back to w/c with CGA with RW.    Activities of Daily Living:  · Toileting: CGA for t/f's and for standing balance to perform hygiene.  She was able to urinate.     [facility] 6 Click ADL: 16    OT Exercises: UE Ergometer for 10 min on minimal resistance for UE strengthening that's required for daily activities.  She was fatigued after performing.    Treatment & Education:  - Pt and her son educated on safe t/f techniques to and from the toilet, to and from the tub, and on using the 3-in-1 commode various ways (bedside commode, over the toilet, and as a seat while pt performs grooming at the sink due to pt's bathroom not being w/c accessible).    Pt edu on role of OT, POC, safety when performing self care tasks, benefit of performing OOB activity, and safety when performing functional transfers and mobility.  - White board updated  - Self care tasks completed-- as noted above     - Pt and her son educated on not using her rollator due to its  defective brakes.        Patient left up in chair with all lines intact, call button in reach, nursing notified and her son presentEducation:      GOALS:   Multidisciplinary Problems     Occupational Therapy Goals        Problem: Occupational Therapy    Goal Priority Disciplines Outcome Interventions   Occupational Therapy Goal     OT, PT/OT Ongoing, Progressing    Description: Goals to be met by: 30 days     Patient will increase functional independence with ADLs by performing:    Feeding with Modified [place].  UE Dressing with Set-up Assistance.  LE Dressing with Minimal Assistance.  Grooming while seated at sink with Modified [place].  Toileting from toilet or BSC with Minimal Assistance for hygiene and clothing management.  -MET  Bathing from  sitting at sink with Minimal Assistance.  Supine to sit with Stand-by Assistance.  Step transfer with Contact Guard Assistance with RW. - MET  Upper extremity exercise with supervision.  Caregiver will be educated on level of assist required to safely perform self care tasks and functional transfers..                      Time Tracking:     OT Date of Treatment: 07/27/22  OT Start Time: 1304 (returned at 1338)    OT Stop Time: 1326 (left 2nd time at 1354)  OT Total Time (min): 38 min    Billable Minutes:Self Care/Home Management 14 min  Therapeutic Activity 14 min  Therapeutic Exercise 10 min    7/27/2022

## 2022-07-27 NOTE — PLAN OF CARE
Problem: Physical Therapy  Goal: Physical Therapy Goal  Description: Goals to be met in 30 days (2022):     Patient will increase functional independence with mobility by performin. Supine to sit with MInimal Assistance.  2. Sit to supine with MInimal Assistance.  3. Rolling to Left and Right with Minimal Assistance.  4. Sit to stand transfer with Contact Guard Assistance. Met  Updated 2022 Sit to stand transfer with (I)  5. Bed to chair transfer with Minimal Assistance using Rolling Walker -met  Updated 2022: Bed to chair transfer with CGA using Rolling Walker.-met  Updated 2022 Bed to chair transfer with (I)  6. Gait x 50 feet with Minimal Assistance using Rolling Walker. Met  Updated 2022  Gait x 150 feet with SBA/(S) using Rolling Walker.  7. Wheelchair propulsion x 50 feet with Stand-by Assistance using bilateral upper extremities. Met  Updated 2022 Wheelchair propulsion x 100 feet with Mod I using bilateral upper extremities  8. Ascend/Descend 4 inch curb step with Moderate Assistance using Rolling Walker. Met  Updated 2022 Ascend/Descend 4 inch curb step with SBAusing Rolling Walker  9. Lower extremity exercise program x20 reps per handout, with supervision. -met  Updated 2022: Lower extremity exercise program x30 reps per handout, with supervision.    Outcome: Ongoing, Progressing

## 2022-07-27 NOTE — PLAN OF CARE
Problem: Occupational Therapy  Goal: Occupational Therapy Goal  Description: Goals to be met by: 30 days     Patient will increase functional independence with ADLs by performing:    Feeding with Modified [place].  UE Dressing with Set-up Assistance.  LE Dressing with Minimal Assistance.  Grooming while seated at sink with Modified [place].  Toileting from toilet or BSC with Minimal Assistance for hygiene and clothing management.  - MET  Bathing from  sitting at sink with Minimal Assistance.  Supine to sit with Stand-by Assistance.  Step transfer with Contact Guard Assistance with RW. - MET  Upper extremity exercise with supervision.  Caregiver will be educated on level of assist required to safely perform self care tasks and functional transfers..     Outcome: Ongoing, Progressing     Continue OT POC.

## 2022-07-27 NOTE — PLAN OF CARE
Problem: Adult Inpatient Plan of Care  Goal: Plan of Care Review  Outcome: Ongoing, Progressing  Goal: Patient-Specific Goal (Individualized)  Outcome: Ongoing, Progressing     Problem: Adjustment to Illness (Sepsis/Septic Shock)  Goal: Optimal Coping  Outcome: Ongoing, Progressing     Problem: Bleeding (Sepsis/Septic Shock)  Goal: Absence of Bleeding  Outcome: Ongoing, Progressing     Problem: Infection Progression (Sepsis/Septic Shock)  Goal: Absence of Infection Signs and Symptoms  Outcome: Ongoing, Progressing     Problem: Impaired Wound Healing  Goal: Optimal Wound Healing  Outcome: Ongoing, Progressing     Problem: Fall Injury Risk  Goal: Absence of Fall and Fall-Related Injury  Outcome: Ongoing, Progressing

## 2022-07-28 LAB
ANION GAP SERPL CALC-SCNC: 8 MMOL/L (ref 8–16)
BASOPHILS # BLD AUTO: 0.04 K/UL (ref 0–0.2)
BASOPHILS NFR BLD: 0.9 % (ref 0–1.9)
BUN SERPL-MCNC: 16 MG/DL (ref 8–23)
CALCIUM SERPL-MCNC: 8.9 MG/DL (ref 8.7–10.5)
CHLORIDE SERPL-SCNC: 109 MMOL/L (ref 95–110)
CO2 SERPL-SCNC: 25 MMOL/L (ref 23–29)
CREAT SERPL-MCNC: 0.8 MG/DL (ref 0.5–1.4)
DIFFERENTIAL METHOD: ABNORMAL
EOSINOPHIL # BLD AUTO: 0.2 K/UL (ref 0–0.5)
EOSINOPHIL NFR BLD: 4.8 % (ref 0–8)
ERYTHROCYTE [DISTWIDTH] IN BLOOD BY AUTOMATED COUNT: 15.4 % (ref 11.5–14.5)
EST. GFR  (AFRICAN AMERICAN): >60 ML/MIN/1.73 M^2
EST. GFR  (NON AFRICAN AMERICAN): >60 ML/MIN/1.73 M^2
GLUCOSE SERPL-MCNC: 89 MG/DL (ref 70–110)
HCT VFR BLD AUTO: 28.7 % (ref 37–48.5)
HGB BLD-MCNC: 8.5 G/DL (ref 12–16)
IMM GRANULOCYTES # BLD AUTO: 0.02 K/UL (ref 0–0.04)
IMM GRANULOCYTES NFR BLD AUTO: 0.4 % (ref 0–0.5)
LYMPHOCYTES # BLD AUTO: 1.4 K/UL (ref 1–4.8)
LYMPHOCYTES NFR BLD: 30.3 % (ref 18–48)
MAGNESIUM SERPL-MCNC: 1.9 MG/DL (ref 1.6–2.6)
MCH RBC QN AUTO: 25.9 PG (ref 27–31)
MCHC RBC AUTO-ENTMCNC: 29.6 G/DL (ref 32–36)
MCV RBC AUTO: 88 FL (ref 82–98)
MONOCYTES # BLD AUTO: 0.5 K/UL (ref 0.3–1)
MONOCYTES NFR BLD: 11.6 % (ref 4–15)
NEUTROPHILS # BLD AUTO: 2.4 K/UL (ref 1.8–7.7)
NEUTROPHILS NFR BLD: 52 % (ref 38–73)
NRBC BLD-RTO: 0 /100 WBC
PHOSPHATE SERPL-MCNC: 3.9 MG/DL (ref 2.7–4.5)
PLATELET # BLD AUTO: 269 K/UL (ref 150–450)
PMV BLD AUTO: 10.9 FL (ref 9.2–12.9)
POTASSIUM SERPL-SCNC: 3.9 MMOL/L (ref 3.5–5.1)
RBC # BLD AUTO: 3.28 M/UL (ref 4–5.4)
SODIUM SERPL-SCNC: 142 MMOL/L (ref 136–145)
WBC # BLD AUTO: 4.56 K/UL (ref 3.9–12.7)

## 2022-07-28 PROCEDURE — 97530 THERAPEUTIC ACTIVITIES: CPT | Mod: CO

## 2022-07-28 PROCEDURE — 25000003 PHARM REV CODE 250: Performed by: NURSE PRACTITIONER

## 2022-07-28 PROCEDURE — 63600175 PHARM REV CODE 636 W HCPCS: Performed by: NURSE PRACTITIONER

## 2022-07-28 PROCEDURE — 85025 COMPLETE CBC W/AUTO DIFF WBC: CPT | Performed by: HOSPITALIST

## 2022-07-28 PROCEDURE — 36415 COLL VENOUS BLD VENIPUNCTURE: CPT | Performed by: HOSPITALIST

## 2022-07-28 PROCEDURE — 97530 THERAPEUTIC ACTIVITIES: CPT | Mod: CQ

## 2022-07-28 PROCEDURE — 83735 ASSAY OF MAGNESIUM: CPT | Performed by: HOSPITALIST

## 2022-07-28 PROCEDURE — 11000004 HC SNF PRIVATE

## 2022-07-28 PROCEDURE — 80048 BASIC METABOLIC PNL TOTAL CA: CPT | Performed by: HOSPITALIST

## 2022-07-28 PROCEDURE — 25000003 PHARM REV CODE 250: Performed by: HOSPITALIST

## 2022-07-28 PROCEDURE — 84100 ASSAY OF PHOSPHORUS: CPT | Performed by: HOSPITALIST

## 2022-07-28 PROCEDURE — 97116 GAIT TRAINING THERAPY: CPT | Mod: CQ

## 2022-07-28 RX ORDER — POLYETHYLENE GLYCOL 3350 17 G/17G
17 POWDER, FOR SOLUTION ORAL DAILY
Status: DISCONTINUED | OUTPATIENT
Start: 2022-07-29 | End: 2022-08-01 | Stop reason: HOSPADM

## 2022-07-28 RX ORDER — BISACODYL 10 MG
10 SUPPOSITORY, RECTAL RECTAL DAILY PRN
Status: DISCONTINUED | OUTPATIENT
Start: 2022-07-28 | End: 2022-08-01 | Stop reason: HOSPADM

## 2022-07-28 RX ADMIN — OXYCODONE HYDROCHLORIDE 15 MG: 10 TABLET ORAL at 01:07

## 2022-07-28 RX ADMIN — MENTHOL, METHYL SALICYLATE: 10; 15 CREAM TOPICAL at 09:07

## 2022-07-28 RX ADMIN — METOPROLOL SUCCINATE 25 MG: 25 TABLET, EXTENDED RELEASE ORAL at 09:07

## 2022-07-28 RX ADMIN — OXYCODONE HYDROCHLORIDE 15 MG: 10 TABLET ORAL at 03:07

## 2022-07-28 RX ADMIN — DICLOFENAC SODIUM 4 G: 10 GEL TOPICAL at 09:07

## 2022-07-28 RX ADMIN — MICONAZOLE NITRATE: 20 CREAM TOPICAL at 09:07

## 2022-07-28 RX ADMIN — OXYCODONE HYDROCHLORIDE AND ACETAMINOPHEN 1000 MG: 500 TABLET ORAL at 09:07

## 2022-07-28 RX ADMIN — Medication 400 MG: at 09:07

## 2022-07-28 RX ADMIN — MENTHOL, METHYL SALICYLATE: 10; 15 CREAM TOPICAL at 03:07

## 2022-07-28 RX ADMIN — OXYCODONE HYDROCHLORIDE 15 MG: 10 TABLET ORAL at 09:07

## 2022-07-28 RX ADMIN — LIDOCAINE 1 PATCH: 50 PATCH CUTANEOUS at 06:07

## 2022-07-28 RX ADMIN — PANTOPRAZOLE SODIUM 40 MG: 40 TABLET, DELAYED RELEASE ORAL at 09:07

## 2022-07-28 RX ADMIN — ATORVASTATIN CALCIUM 40 MG: 40 TABLET, FILM COATED ORAL at 06:07

## 2022-07-28 RX ADMIN — ENOXAPARIN SODIUM 40 MG: 100 INJECTION SUBCUTANEOUS at 06:07

## 2022-07-28 RX ADMIN — OXYCODONE HYDROCHLORIDE 10 MG: 10 TABLET ORAL at 09:07

## 2022-07-28 RX ADMIN — METHENAMINE HIPPURATE 1 G: 1 TABLET ORAL at 09:07

## 2022-07-28 RX ADMIN — SENNOSIDES AND DOCUSATE SODIUM 1 TABLET: 50; 8.6 TABLET ORAL at 09:07

## 2022-07-28 RX ADMIN — CYANOCOBALAMIN TAB 1000 MCG 1000 MCG: 1000 TAB at 09:07

## 2022-07-28 RX ADMIN — AMLODIPINE BESYLATE 10 MG: 10 TABLET ORAL at 09:07

## 2022-07-28 RX ADMIN — CALCIUM CARBONATE (ANTACID) CHEW TAB 500 MG 500 MG: 500 CHEW TAB at 06:07

## 2022-07-28 NOTE — PLAN OF CARE
Problem: Adult Inpatient Plan of Care  Goal: Plan of Care Review  Outcome: Ongoing, Progressing  Goal: Patient-Specific Goal (Individualized)  Outcome: Ongoing, Progressing     Problem: Adjustment to Illness (Sepsis/Septic Shock)  Goal: Optimal Coping  Outcome: Ongoing, Progressing     Problem: Glycemic Control Impaired (Sepsis/Septic Shock)  Goal: Blood Glucose Level Within Desired Range  Outcome: Ongoing, Progressing     Problem: Infection Progression (Sepsis/Septic Shock)  Goal: Absence of Infection Signs and Symptoms  Outcome: Ongoing, Progressing     Problem: Impaired Wound Healing  Goal: Optimal Wound Healing  Outcome: Ongoing, Progressing     Problem: Skin Injury Risk Increased  Goal: Skin Health and Integrity  Outcome: Ongoing, Progressing

## 2022-07-28 NOTE — PLAN OF CARE
[facility] served patient [facility], copy was given to patient also faxed to [facility]

## 2022-07-28 NOTE — PLAN OF CARE
Care provided to patient as per POC and as charted.  Pt. With uncertain incontinence. Pt. Knows when she goes and encouraged to call for restroom.  Pt. On purewick.  No acute events overnight.   Problem: Adult Inpatient Plan of Care  Goal: Plan of Care Review  Outcome: Ongoing, Progressing  Goal: Patient-Specific Goal (Individualized)  Outcome: Ongoing, Progressing  Goal: Absence of Hospital-Acquired Illness or Injury  Outcome: Ongoing, Progressing  Goal: Optimal Comfort and Wellbeing  Outcome: Ongoing, Progressing  Goal: Readiness for Transition of Care  Outcome: Ongoing, Progressing     Problem: Adjustment to Illness (Sepsis/Septic Shock)  Goal: Optimal Coping  Outcome: Ongoing, Progressing     Problem: Bleeding (Sepsis/Septic Shock)  Goal: Absence of Bleeding  Outcome: Ongoing, Progressing     Problem: Glycemic Control Impaired (Sepsis/Septic Shock)  Goal: Blood Glucose Level Within Desired Range  Outcome: Ongoing, Progressing     Problem: Infection Progression (Sepsis/Septic Shock)  Goal: Absence of Infection Signs and Symptoms  Outcome: Ongoing, Progressing     Problem: Nutrition Impaired (Sepsis/Septic Shock)  Goal: Optimal Nutrition Intake  Outcome: Ongoing, Progressing     Problem: Fluid and Electrolyte Imbalance (Acute Kidney Injury/Impairment)  Goal: Fluid and Electrolyte Balance  Outcome: Ongoing, Progressing     Problem: Oral Intake Inadequate (Acute Kidney Injury/Impairment)  Goal: Optimal Nutrition Intake  Outcome: Ongoing, Progressing     Problem: Renal Function Impairment (Acute Kidney Injury/Impairment)  Goal: Effective Renal Function  Outcome: Ongoing, Progressing     Problem: Infection  Goal: Absence of Infection Signs and Symptoms  Outcome: Ongoing, Progressing     Problem: Impaired Wound Healing  Goal: Optimal Wound Healing  Outcome: Ongoing, Progressing     Problem: Skin Injury Risk Increased  Goal: Skin Health and Integrity  Outcome: Ongoing, Progressing     Problem: Oral Intake  Inadequate  Goal: Improved Oral Intake  Outcome: Ongoing, Progressing     Problem: Fall Injury Risk  Goal: Absence of Fall and Fall-Related Injury  Outcome: Ongoing, Progressing

## 2022-07-28 NOTE — PROGRESS NOTES
[facility] - Skilled Nursing  Wound Care    Patient Name:  Patito Domingo   MRN:  [id number]  Date: 7/28/2022  Diagnosis: Bacteremia    History:     Past Medical History:   Diagnosis Date    Allergy     Arthritis     Cataract     Chronic diastolic (congestive) heart failure     Colon cancer     Encounter for blood transfusion     History of ESBL E. 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)        Social History     Socioeconomic History    Marital status:    Occupational History    Occupation: disabled    Tobacco Use    Smoking status: Never Smoker    Smokeless tobacco: Never Used   Substance and Sexual Activity    Alcohol use: No     Alcohol/week: 0.0 standard drinks    Drug use: No    Sexual activity: Not Currently   Social History Narrative    Pt lives with son     Social Determinants of Health     Financial Resource Strain: Medium Risk    Difficulty of Paying Living Expenses: Somewhat hard   Food Insecurity: No Food Insecurity    Worried About Running Out of Food in the Last Year: Never true    Ran Out of Food in the Last Year: Never true   Transportation Needs: No Transportation Needs    Lack of Transportation (Medical): No    Lack of Transportation (Non-Medical): No   Physical Activity: Inactive    Days of Exercise per Week: 0 days    Minutes of Exercise per Session: 0 min   Stress: No Stress Concern Present    Feeling of Stress : Only a little   Social Connections: Socially Isolated    Frequency of Communication with Friends and Family: Three times a week    Frequency of Social Gatherings with Friends and Family: Once a week    Attends Worship Services: Never    Active Member of Clubs or Organizations: No    Attends Club or Organization  Meetings: Never    Marital Status:    Housing Stability: Low Risk     Unable to Pay for Housing in the Last Year: No    Number of Places Lived in the Last Year: 1    Unstable Housing in the Last Year: No       Precautions:     Allergies as of 07/12/2022 - Reviewed 07/12/2022   Allergen Reaction Noted    Contrast media Hives, Itching, and Swelling 09/24/2014    Epinephrine Anaphylaxis 09/24/2014    Ibuprofen Hives, Itching, and Swelling 09/24/2014    Zofran [ondansetron hcl] Itching 03/04/2022    Iodinated contrast media      Morphine Other (See Comments) 04/03/2022    Sulfa (sulfonamide antibiotics) Hives, Itching, and Swelling 09/24/2014       WOC Assessment Details/Treatment   Wound care follow-up  The right buttock wound bed is red/moist partial thickness, epithelization.   Wound care, repositioning discussed, verbalized understanding    Plan:  Right buttocks- triad ointment/telfa island dressing BID/prn cleansing  EHOB waffle overlay, chair cushion in use    Nursing to continue care, pressure prevention measures  Wound care will follow-up     Recommendations made to primary team for above plan per written report . Orders placed.      07/28/22 1000        Altered Skin Integrity 07/07/22 1104 Right Buttocks Shearing Partial thickness tissue loss. Shallow open ulcer with a red or pink wound bed, without slough. Intact or Open/Ruptured Serum-filled blister.   Date First Assessed/Time First Assessed: 07/07/22 1104   Altered Skin Integrity Present on Admission: suspected hospital acquired  Side: Right  Location: Buttocks  Is this injury device related?: No  Primary Wound Type: Shearing  Description of Altere...   Drainage Characteristics/Odor Clear   Appearance Pink;Red;Moist;Epithelialization   Tissue loss description Partial thickness   Periwound Area Intact;Dry;Pink;Scar tissue   Wound Edges Irregular;Open   Wound Length (cm) 2.5 cm   Wound Width (cm) 1.5 cm   Wound Depth (cm) 0.2 cm   Wound Volume (cm^3)  0.75 cm^3   Wound Surface Area (cm^2) 3.75 cm^2   Care Cleansed with:;Sterile normal saline;Applied:;Skin Barrier   Dressing Changed;Island/border   Dressing Change Due 07/28/22 07/28/2022

## 2022-07-28 NOTE — PLAN OF CARE
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  Last Assessment & Plan:   Formatting of this note might be different from the original.  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       Follow Up Appointments:  Future Appointments   Date Time Provider Department Center   8/16/2022 11:20 AM DO [facility]   9/1/2022  9:20 AM Ervin Parikh MD [facility] UROLOGY [facility]   9/21/2022 10:40 AM DO TAR[facility]       Allergies:  Review of patient's allergies indicates:   Allergen Reactions    Contrast media Hives, Itching and Swelling    Epinephrine Anaphylaxis     Can cause  a Carcinoid Crisis    Ibuprofen Hives, Itching and Swelling    Zofran [ondansetron hcl] Itching     And multiple other reactions    Iodinated contrast media     Morphine Other (See Comments)    Sulfa (sulfonamide antibiotics) Hives, Itching and Swelling       Medications: Review discharge medications with patient and family and provide education.      I have seen  and examined this patient within the last 30 days. My clinical findings that support the need for the home health skilled services and home bound status are the following:no   Weakness/numbness causing balance and gait disturbance due to Infection making it taxing to leave home.     Referrals/ Consults  Physical Therapy to evaluate and treat. Evaluate for home safety and equipment needs; Establish/upgrade home exercise program. Perform / instruct on therapeutic exercises, gait training, transfer training, and Range of Motion.  Occupational Therapy to evaluate and treat. Evaluate home environment for safety and equipment needs. Perform/Instruct on transfers, ADL training, ROM, and therapeutic exercises.  Aide to provide assistance with personal care, ADLs, and vital signs.    Activities:   activity as tolerated    Nursing:   Agency to admit patient within 24 hours of hospital discharge unless specified on physician order or at patient request    SN to complete comprehensive assessment including routine vital signs. Instruct on disease process and s/s of complications to report to MD. Review/verify medication list sent home with the patient at time of discharge  and instruct patient/caregiver as needed. Frequency may be adjusted depending on start of care date.     Skilled nurse to perform up to 3 visits PRN for symptoms related to diagnosis    Notify MD if SBP > 160 or < 90; DBP > 90 or < 50; HR > 120 or < 50; Temp > 101; O2 < 88%    Ok to schedule additional visits based on staff availability and patient request on consecutive days within the home health episode.    Home Health Aide:  Nursing Three times weekly, Physical Therapy Three times weekly, Occupational Therapy Three times weekly and Home Health Aide Three times weekly    Wound Care Orders    Right buttock   1. Cleanse with sea clens wound cleanser  2. Pat dry  3. Apply Triad ointment BID/prn cleansing  4.cover with border gauze/telfa island dressing      I  certify that this patient is confined to her home and needs intermittent skilled nursing care, physical therapy and occupational therapy.

## 2022-07-28 NOTE — PT/OT/SLP PROGRESS
"Physical Therapy Treatment    Patient Name:  Patito Domingo   MRN:  [id number]  Admit Date: 7/12/2022  Admitting Diagnosis: Bacteremia  Recent Surgeries:     General Precautions: Standard, fall   Orthopedic Precautions:N/A   Braces:       Recommendations:     Discharge Recommendations:  home health PT   Level of Assistance Recommended at Discharge: Intermittent assistance   Discharge Equipment Recommendations: bedside commode, bath bench, grab bar, rollator, shower chair, walker, rolling, wheelchair   Barriers to discharge: Decreased caregiver support (increased assistance needed)    Assessment:     Patito Domingo is a 72 y.o. female admitted with a medical diagnosis of Bacteremia . Pt tolerated fairly well, limited by c/o pain, however also appears self limiting, not motivated, requiring encouragement for participation, pt would continue to benefit from skilled PT services to improve overall functional mobility, strength and endurance.  .      Performance deficits affecting function:  weakness, impaired endurance, impaired self care skills, impaired functional mobility, gait instability, impaired balance, decreased lower extremity function, decreased upper extremity function, impaired cardiopulmonary response to activity .    Rehab Potential is good    Activity Tolerance: Fair    Plan:     Patient to be seen 6 x/week to address the above listed problems via gait training, therapeutic activities, therapeutic exercises, neuromuscular re-education, wheelchair management/training    · Plan of Care Expires: 08/02/22  · Plan of Care Reviewed with: patient    Subjective     "OK" agreeable to therapy.     Pain/Comfort:  Pain Rating 1: 9/10  Location - Side 1: Bilateral (R>L)  Location - Orientation 1: generalized  Location 1: sacral spine  Pain Addressed 1: Pre-medicate for activity, Reposition, Distraction, Cessation of Activity, Nurse notified (wound care nurse spoke with her during session will see her soon)  Pain " Rating Post-Intervention 1:  (inc with mobility, reinforced wt shifting/pressure relief)    Patient's cultural, spiritual, Gnosticism conflicts given the current situation:  no    Objective:       Patient found with  (in wc) upon PT entry to room.     Therapeutic Activities and Exercises: 2x10 reps AP,LAQ,hip flex,abd/add declined LBE, nor anyhing else today wanting to see OT and finish up    Functional Mobility:  · Transfers:     · Sit to Stand:  stand by assistance with rolling walker  · Gait: amb with RW SBA (CGA for pants) vcs for erect posture ~ 150 ft  · Wheelchair Propulsion: ~ 130 ft with BUE S    AM-PAC 6 CLICK MOBILITY  17    Patient left up in chair with with OT.    GOALS:   Multidisciplinary Problems     Physical Therapy Goals        Problem: Physical Therapy    Goal Priority Disciplines Outcome Goal Variances Interventions   Physical Therapy Goal     PT, PT/OT Ongoing, Progressing     Description: Goals to be met in 30 days (2022):     Patient will increase functional independence with mobility by performin. Supine to sit with MInimal Assistance.  2. Sit to supine with MInimal Assistance.  3. Rolling to Left and Right with Minimal Assistance.  4. Sit to stand transfer with Contact Guard Assistance. Met  Updated 2022 Sit to stand transfer with (I)  5. Bed to chair transfer with Minimal Assistance using Rolling Walker -met  Updated 2022: Bed to chair transfer with CGA using Rolling Walker.-met  Updated 2022 Bed to chair transfer with (I)  6. Gait x 50 feet with Minimal Assistance using Rolling Walker. Met  Updated 2022  Gait x 150 feet with SBA/(S) using Rolling Walker.  7. Wheelchair propulsion x 50 feet with Stand-by Assistance using bilateral upper extremities. Met  Updated 2022 Wheelchair propulsion x 100 feet with Mod I using bilateral upper extremities  8. Ascend/Descend 4 inch curb step with Moderate Assistance using Rolling Walker. Met  Updated 2022  Ascend/Descend 4 inch curb step with SBAusing Rolling Walker  9. Lower extremity exercise program x20 reps per handout, with supervision. -met  Updated 07/21/2022: Lower extremity exercise program x30 reps per handout, with supervision.                     Time Tracking:     PT Received On: 07/28/22  PT Start Time: 0850  PT Stop Time: 0913  PT Total Time (min): 23 min    Billable Minutes: Gait Training 10 and Therapeutic Activity 13    Treatment Type: Treatment  PT/PTA: PTA     PTA Visit Number: 1     07/28/2022

## 2022-07-28 NOTE — PROGRESS NOTES
[facility]                                  Skilled Nursing Facility                   Progress Note     Admit Date: 7/12/2022  JULIO C TBD  Principal Problem:  Bacteremia   HPI obtained from patient interview and chart review     Chief Complaint: Re-evaluation of medical treatment and therapy status: Lab review, constipation    HPI:   Mrs. 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  Admission to SNF for secondary weakness and debility.     Interval history: All of today's labs reviewed and are listed below.  24 hr vital sign ranges listed below.  Patient states pain to her RLE/hip slightly improved today.  Patient is stating she is having trouble having bowel movements, will increase bowel regimen.  Last BM 7/26, active bowel sounds, passing flatus, denies abdominal discomfort.  Patient denies shortness of breath, abdominal discomfort, nausea, or vomiting.  Patient reports an adequate appetite.  Patient denies dysuria.  Patient reports having regular bowel movements.  Patient progessing with PT/OT-Gait: amb with RW SBA (CGA for pants) vcs for erect posture ~ 150 ft. Continuing to follow and treat all acute and chronic conditions.    Past Medical History: Patient has a past medical history of Allergy, Arthritis, Cataract, Chronic diastolic (congestive) heart failure, Colon cancer, Encounter for blood transfusion, History of ESBL E. 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    Past Surgical History: Patient has a past surgical history that includes Liver biopsy (9/14);  Lithotripsy; cystoscope; Hysterectomy (1996); Colon surgery; Eye surgery; Cataract extraction (Left, 10/2017); Cholecystectomy; Cystoscopy w/ retrogrades (Right, 10/10/2019); Ureteroscopy (Right, 10/10/2019);  section; Uterine fibroid surgery; Abdominal surgery; ERCP (N/A, 2021); and ERCP (N/A, 3/4/2022).    Social History: Patient reports that she has never smoked. She has never used smokeless tobacco. She reports that she does not drink alcohol and does not use drugs.    Family History: family history includes Alzheimer's disease in her father; Cancer in her mother; No Known Problems in her son, son, son, and son; Stroke in her sister.    Allergies: Patient is allergic to contrast media, epinephrine, ibuprofen, zofran [ondansetron hcl], iodinated contrast media, morphine, and sulfa (sulfonamide antibiotics).    ROS  Constitutional: Negative for fever   Eyes: Negative for blurred vision, double vision   Respiratory: Negative for cough, shortness of breath   Cardiovascular: Negative for chest pain, palpitations, and leg swelling.   Gastrointestinal: Negative for abdominal pain, diarrhea, nausea, vomiting.  +constipation  Genitourinary: neg for dysuria, frequency   Musculoskeletal:  + generalized weakness.  +RLE pain  Skin: Negative for itching and rash.   Neurological: Negative for dizziness, headaches.   Psychiatric/Behavioral: Negative for depression. The patient is not nervous/anxious.      24 hour Vital Sign Range   Temp:  [98.1 °F (36.7 °C)-99.2 °F (37.3 °C)]   Pulse:  [73-82]   Resp:  [17-18]   BP: (129-143)/(60-64)   SpO2:  [97 %-99 %]     PEx  Constitutional: Patient appears debilitated.  No distress noted  HENT:   Head: Normocephalic and atraumatic.   Eyes: Pupils are equal, round  Neck: Normal range of motion. Neck supple.   Cardiovascular: Normal rate, regular rhythm and normal heart sounds.    Pulmonary/Chest: Effort normal and breath sounds are clear  Abdominal: Soft. Bowel sounds are  normal.   Musculoskeletal: Normal range of motion.   Neurological: Alert and oriented to person, place, and time.   Psychiatric: Normal mood and affect. Behavior is normal.   Skin: Skin is warm and dry.     07/28/22 1000         Altered Skin Integrity 07/07/22 1104 Right Buttocks Shearing Partial thickness tissue loss. Shallow open ulcer with a red or pink wound bed, without slough. Intact or Open/Ruptured Serum-filled blister.   Date First Assessed/Time First Assessed: 07/07/22 1104   Altered Skin Integrity Present on Admission: suspected hospital acquired  Side: Right  Location: Buttocks  Is this injury device related?: No  Primary Wound Type: Shearing  Description of Altere...   Drainage Characteristics/Odor Clear   Appearance Pink;Red;Moist;Epithelialization   Tissue loss description Partial thickness   Periwound Area Intact;Dry;Pink;Scar tissue   Wound Edges Irregular;Open   Wound Length (cm) 2.5 cm   Wound Width (cm) 1.5 cm   Wound Depth (cm) 0.2 cm   Wound Volume (cm^3) 0.75 cm^3   Wound Surface Area (cm^2) 3.75 cm^2   Care Cleansed with:;Sterile normal saline;Applied:;Skin Barrier   Dressing Changed;Island/border   Dressing Change Due 07/28/22         Recent Labs   Lab 07/28/22  0542      K 3.9      CO2 25   BUN 16   CREATININE 0.8   MG 1.9       Recent Labs   Lab 07/28/22  0542   WBC 4.56   RBC 3.28*   HGB 8.5*   HCT 28.7*      MCV 88   MCH 25.9*   MCHC 29.6*         Assessment and Plan:    Constipation  - initiated MiraLax daily, PRN suppository, continue senna docusate 1 tab BID    Moisture associated dermatitis  - miconazole ointment BID to perineal area    RLE pain  - previous ultrasound negative for DVT  - improved, continue oxycodone 10 mg or 15mg  q.4 hours PRN,  BenGay topical cream TID    Hypomagnesemia  - magnesium oxide 400 mg BID     Bacteremia  UTI   - presence of large bilateral renal stones complicates picture, although non-obstructive  - bilateral DVT US is negative  -  repeat blood cx -ngtd  - continue CTX 1g IV q 24H - will need 2 weeks from negative cx - start date is 7/2- end date is 7/18  - possible that fever is related to carcinoid      NSTEMI (non-ST elevated myocardial infarction)  - Trop trending down. No EKG changes or c/o chest pain  - TTE reviewed.  - follow-up with Cardiology as outpatient     Chronic diastolic (congestive) heart failure  - No signs of decompensation  - Cont metoprolol  - TTE showing grade I diastolic dysfunction  - Monitor I&O      HLD (hyperlipidemia)  - Continue atorvastatin      Anemia of chronic disease  - continue monitor twice weekly CBCs, transfuse for hemoglobin < 7    Essential hypertension  - continue amlodipine 10 mg daily, metoprolol XL 25 mg daily.  Home losartan on hold due to previous FLORECITA    Metastatic carcinoid tumor  - ER physician discussed the case with Dr. Perez with neuroendocrine services  - Continue follow up outpatient as scheduled.  Not receiving any further treatment      Neuropathy pain  - continue gabapentin 30 mg qHS.    Palliative care encounter  Notes per [facility] palliative care NP  - Pt elects to remain full code/full treatment status  - She does not have advance directives.  She is  and has 4 sons. If she becomes unable to make medical decisions for herself, she would want them to share surrogate decision making responsibility  - Her primary goal at this time is to regain strength to be able to go home, although she does mention that she is lonely at home.She understands that she is high-risk for readmission based on hospitalizations required over the last 6 months but feels that hospital encounters are not affecting her quality of life.  She is satisfied with home-based palliative care and would like for that to continue after SNF.   - Her goals include to regain strength and to extend life regardless of treatment burden. She confirms that she does want to continue cancer treatments.     Debility   - Continue  with PT/OT for gait training and strengthening and restoration of ADL's   - Encourage mobility, OOB in chair, and early ambulation as appropriate  - Fall precautions   - Monitor for bowel and bladder dysfunction  - Monitor for and prevent skin breakdown and pressure ulcers  - continue DVT prophylaxis with  Lovenox      Anticipate disposition:  Home with home health      Follow-up needed during SNF stay-    Appointment to send patient to- home visit on 07/15    Follow-up needed after discharge from SNF:   - PCP, hospital follow-up  - neuroendocrine service  - Cardiology    Future Appointments   Date Time Provider Department Center   8/16/2022 11:20 AM DO TARAH Savage   9/1/2022  9:20 AM Ervin Parikh MD [facility] UROLOGY [facility]   9/21/2022 10:40 AM DO TARAH Savage NP  Department of [facility] Medicine   [facility]- Skilled Nursing Facility     DOS: 7/28/2022       Patient note was created using MModal Dictation.  Any errors in syntax or even information may not have been identified and edited on initial review prior to signing this note.

## 2022-07-28 NOTE — PT/OT/SLP PROGRESS
Occupational Therapy   Treatment    Name: Patito Domingo  MRN: [id number]  Admit Date: 7/12/2022  Admitting Diagnosis:  Bacteremia    General Precautions: Standard, fall   Orthopedic Precautions:N/A   Braces:       Recommendations:     Discharge Recommendations: home health OT  Level of Assistance Recommended at Discharge: 24 hours light assistance for ADL's and homemaking tasks  Discharge Equipment Recommendations:  bedside commode, bath bench, grab bar, walker, rolling, wheelchair  Barriers to discharge:  Decreased caregiver support    Assessment:     Patito Domingo is a 72 y.o. female with a medical diagnosis of Bacteremia She presents with  Performance deficits affecting function are are weakness, impaired endurance, impaired self care skills, impaired functional mobilty, gait instability, pain, edema, decreased lower extremity function.        .   Pt.  participated fair with session on this day. Pt Pt. Still continues to complaint of discomfort in buttox area with prolonged  Sitting. Pt  continues to demonstrate levels of physical deficits with  functional indep with daily management activities tasks, selfcare skills with balance,  functional mobility, UB strength and endurance. Pt. Will continue to benefit from continued OT to progress towards goals     Rehab Potential is fair    Activity tolerance:  Fair    Plan:     Patient to be seen 6 x/week to address the above listed problems via self-care/home management, therapeutic activities, therapeutic exercises, neuromuscular re-education    · Plan of Care Expires: 08/13/22  · Plan of Care Reviewed with: patient    Subjective     Communicated with: PTA  prior to session.I can't sit to long my bottom hurts    Pain/Comfort:  Pain Rating 1: 10/10  Location - Side 1: Bilateral  Location - Orientation 1: generalized  Location 1: sacral spine  Pain Addressed 1: Pre-medicate for activity    Patient's cultural, spiritual, Faith conflicts given the current  situation:  no    Objective:     Patient found up in chair in gym   upon OT entry to room.    Bed Mobility:    · Patient completed Scooting/Bridging with minimum assistance  · Patient completed Sit to Supine with moderate assistance for BLE management      Functional Mobility/Transfers:  · Patient completed Sit <> Stand Transfer with minimum assistance  with  rolling walker   · Patient completed Bed <> Chair Transfer using Stand Pivot technique with minimum assistance with no assistive device  · Patient completed Toilet Transfer Stand Pivot technique with minimum assistance with  no AD    Activities of Daily Living:  · Grooming: stand by assistance at sink level with grooming needs  · Toileting: minimum assistance with cleaning and clothing/diaper management from 3n1 level    [facility] 6 Click ADL: 16     OT Exercises: UE Ergometer for 5 min on minimal resistance     Treatment & Education:  Pt. With therex performed to increase ROM, endurance selfcare task and fxl mobility for independence     Pt edu on role of OT, POC, safety when performing self care tasks , benefit of performing OOB activity, and safety when performing functional transfers and mobility management for preparation with goals to progress towards next level of care    Patient left supine with all lines intact and call button in reachEducation:      GOALS:   Multidisciplinary Problems     Occupational Therapy Goals        Problem: Occupational Therapy    Goal Priority Disciplines Outcome Interventions   Occupational Therapy Goal     OT, PT/OT Ongoing, Progressing    Description: Goals to be met by: 30 days     Patient will increase functional independence with ADLs by performing:    Feeding with Modified Garrett.  UE Dressing with Set-up Assistance.  LE Dressing with Minimal Assistance.  Grooming while seated at sink with Modified Garrett.  Toileting from toilet or BSC with Minimal Assistance for hygiene and clothing management.  -MET  Bathing  from  sitting at sink with Minimal Assistance.  Supine to sit with Stand-by Assistance.  Step transfer with Contact Guard Assistance with RW. - MET  Upper extremity exercise with supervision.  Caregiver will be educated on level of assist required to safely perform self care tasks and functional transfers..                      Time Tracking:     OT Date of Treatment: 07/28/22  OT Start Time: 0914    OT Stop Time: 0940  OT Total Time (min): 26 min    Billable Minutes:Therapeutic Activity 26 7/28/2022

## 2022-07-29 PROCEDURE — 97535 SELF CARE MNGMENT TRAINING: CPT | Mod: CO

## 2022-07-29 PROCEDURE — 97116 GAIT TRAINING THERAPY: CPT | Mod: CQ

## 2022-07-29 PROCEDURE — 11000004 HC SNF PRIVATE

## 2022-07-29 PROCEDURE — 97530 THERAPEUTIC ACTIVITIES: CPT | Mod: CQ

## 2022-07-29 PROCEDURE — 94761 N-INVAS EAR/PLS OXIMETRY MLT: CPT

## 2022-07-29 PROCEDURE — 25000003 PHARM REV CODE 250: Performed by: NURSE PRACTITIONER

## 2022-07-29 PROCEDURE — 25000003 PHARM REV CODE 250: Performed by: HOSPITALIST

## 2022-07-29 PROCEDURE — 63600175 PHARM REV CODE 636 W HCPCS: Performed by: NURSE PRACTITIONER

## 2022-07-29 RX ORDER — POLYETHYLENE GLYCOL 3350 17 G/17G
17 POWDER, FOR SOLUTION ORAL DAILY
Refills: 0 | COMMUNITY
Start: 2022-07-30 | End: 2023-03-07

## 2022-07-29 RX ORDER — OXYCODONE HYDROCHLORIDE 10 MG/1
10 TABLET ORAL EVERY 4 HOURS PRN
Qty: 30 TABLET | Refills: 0 | Status: SHIPPED | OUTPATIENT
Start: 2022-07-29 | End: 2023-03-07

## 2022-07-29 RX ADMIN — POLYETHYLENE GLYCOL 3350 17 G: 17 POWDER, FOR SOLUTION ORAL at 09:07

## 2022-07-29 RX ADMIN — MICONAZOLE NITRATE: 20 CREAM TOPICAL at 09:07

## 2022-07-29 RX ADMIN — MENTHOL, METHYL SALICYLATE: 10; 15 CREAM TOPICAL at 09:07

## 2022-07-29 RX ADMIN — Medication 400 MG: at 09:07

## 2022-07-29 RX ADMIN — OXYCODONE HYDROCHLORIDE 15 MG: 10 TABLET ORAL at 04:07

## 2022-07-29 RX ADMIN — SENNOSIDES AND DOCUSATE SODIUM 1 TABLET: 50; 8.6 TABLET ORAL at 09:07

## 2022-07-29 RX ADMIN — MENTHOL, METHYL SALICYLATE: 10; 15 CREAM TOPICAL at 03:07

## 2022-07-29 RX ADMIN — ATORVASTATIN CALCIUM 40 MG: 40 TABLET, FILM COATED ORAL at 05:07

## 2022-07-29 RX ADMIN — GABAPENTIN 300 MG: 300 CAPSULE ORAL at 10:07

## 2022-07-29 RX ADMIN — PANTOPRAZOLE SODIUM 40 MG: 40 TABLET, DELAYED RELEASE ORAL at 09:07

## 2022-07-29 RX ADMIN — ENOXAPARIN SODIUM 40 MG: 100 INJECTION SUBCUTANEOUS at 05:07

## 2022-07-29 RX ADMIN — METOPROLOL SUCCINATE 25 MG: 25 TABLET, EXTENDED RELEASE ORAL at 09:07

## 2022-07-29 RX ADMIN — DICLOFENAC SODIUM 4 G: 10 GEL TOPICAL at 09:07

## 2022-07-29 RX ADMIN — LIDOCAINE 1 PATCH: 50 PATCH CUTANEOUS at 05:07

## 2022-07-29 RX ADMIN — METHENAMINE HIPPURATE 1 G: 1 TABLET ORAL at 10:07

## 2022-07-29 RX ADMIN — METHENAMINE HIPPURATE 1 G: 1 TABLET ORAL at 09:07

## 2022-07-29 RX ADMIN — OXYCODONE HYDROCHLORIDE AND ACETAMINOPHEN 1000 MG: 500 TABLET ORAL at 09:07

## 2022-07-29 RX ADMIN — OXYCODONE HYDROCHLORIDE 10 MG: 10 TABLET ORAL at 03:07

## 2022-07-29 RX ADMIN — OXYCODONE HYDROCHLORIDE 10 MG: 10 TABLET ORAL at 09:07

## 2022-07-29 RX ADMIN — AMLODIPINE BESYLATE 10 MG: 10 TABLET ORAL at 09:07

## 2022-07-29 RX ADMIN — OXYCODONE HYDROCHLORIDE 10 MG: 10 TABLET ORAL at 10:07

## 2022-07-29 RX ADMIN — CYANOCOBALAMIN TAB 1000 MCG 1000 MCG: 1000 TAB at 09:07

## 2022-07-29 NOTE — PROGRESS NOTES
[facility] - Skilled Nursing  Adult Nutrition  Progress Note    SUMMARY   Recommendations  Continue regular diet, boost glucose TID, RD following  Goals: 1. Pt's intake meals >50% by RD follow up.  Nutrition Goal Status: goal met  Communication of RD Recs: other (comment) (POC)    Assessment and Plan  Inadequate oral intake     Related to (etiology):   Decreased appetite     Signs and Symptoms (as evidenced by):   Pt with abdominal pain, intake meals 0-25% over last week in hospital.      Interventions(treatment strategy):  Collaboration of care with providers.  General healthy diet.  Commercial beverage: Boost Glucose Control TID     Nutrition Diagnosis Status:   Continues                Malnutrition Assessment             Energy Intake (Malnutrition): less than or equal to 50% for greater than or equal to 5 days   Orbital Region (Subcutaneous Fat Loss): well nourished  Upper Arm Region (Subcutaneous Fat Loss): well nourished  Thoracic and Lumbar Region: well nourished   [place] Region (Muscle Loss): well nourished  Clavicle Bone Region (Muscle Loss): well nourished  Clavicle and Acromion Bone Region (Muscle Loss): well nourished  Scapular Bone Region (Muscle Loss): well nourished  Dorsal Hand (Muscle Loss): well nourished  Patellar Region (Muscle Loss): well nourished  Anterior Thigh Region (Muscle Loss): well nourished  Posterior Calf Region (Muscle Loss): well nourished   Edema (Fluid Accumulation): 0-->no edema present             Reason for Assessment    Reason For Assessment: RD follow-up  Diagnosis: infection/sepsis  Relevant Medical History: metastatic carcinoid tumor on chemotherapy (mets to liver), colon Ca, HTN, HLD, CVA, CHF  Interdisciplinary Rounds: attended  General Information Comments: %  Nutrition Discharge Planning: heart healthy diet    Nutrition/Diet History    Patient Reported Diet/Restrictions/Preferences: general  Typical Food/Fluid Intake: daughter makes meals  Food Preferences: pt  "craved junk foods during chemo treatments  Spiritual, Cultural Beliefs, Oriental orthodox Practices, Values that Affect Care: no    Anthropometrics    Temp: 98.5 °F (36.9 °C)  Height Method: Stated  Height: 5' 6" (167.6 cm)  Height (inches): 66 in  Weight Method: Bed Scale  Weight: 86.2 kg (190 lb 0.6 oz)  Weight (lb): 190.04 lb  Ideal Body Weight (IBW), Female: 130 lb  % Ideal Body Weight, Female (lb): 146.02 %  BMI (Calculated): 30.7       Lab/Procedures/Meds    Pertinent Labs Reviewed: reviewed  Pertinent Labs Comments: Hg 8.5, Hct 28.7  Pertinent Medications Reviewed: reviewed  Pertinent Medications Comments: polyethylene glycol         Estimated/Assessed Needs    Weight Used For Calorie Calculations: 82.1 kg (181 lb)  Energy Calorie Requirements (kcal): 1704 kcal  Energy Need Method: Pindall-St Jeor (PAL 1.25)  Protein Requirements: 82-99g  Weight Used For Protein Calculations: 82.1 kg (181 lb)        RDA Method (mL): 1704         Nutrition Prescription Ordered    Current Diet Order: Regular  Nutrition Order Comments: %  Oral Nutrition Supplement: boost glucose TID    Evaluation of Received Nutrient/Fluid Intake    I/O: +713 to date  Energy Calories Required: meeting needs  Protein Required: meeting needs  Fluid Required: meeting needs  Comments: LBM  7/28  Tolerance: tolerating  % Intake of Estimated Energy Needs: 75 - 100 %  % Meal Intake: 75 - 100 %    Nutrition Risk    Level of Risk/Frequency of Follow-up: low (one time per week)     Monitor and Evaluation    Food and Nutrient Intake: energy intake, food and beverage intake  Food and Nutrient Adminstration: diet order  Knowledge/Beliefs/Attitudes: food and nutrition knowledge/skill  Anthropometric Measurements: weight, weight change  Biochemical Data, Medical Tests and Procedures: electrolyte and renal panel, gastrointestinal profile, glucose/endocrine profile, inflammatory profile, lipid profile  Nutrition-Focused Physical Findings: overall appearance, " extremities, muscles and bones, head and eyes, skin     Nutrition Follow-Up    RD Follow-up?: Yes

## 2022-07-29 NOTE — PLAN OF CARE
Problem: Adult Inpatient Plan of Care  Goal: Plan of Care Review  Outcome: Ongoing, Progressing  Goal: Patient-Specific Goal (Individualized)  Outcome: Ongoing, Progressing  Goal: Absence of Hospital-Acquired Illness or Injury  Outcome: Ongoing, Progressing  Goal: Optimal Comfort and Wellbeing  Outcome: Ongoing, Progressing     Problem: Adjustment to Illness (Sepsis/Septic Shock)  Goal: Optimal Coping  Outcome: Ongoing, Progressing     Problem: Bleeding (Sepsis/Septic Shock)  Goal: Absence of Bleeding  Outcome: Ongoing, Progressing     Problem: Glycemic Control Impaired (Sepsis/Septic Shock)  Goal: Blood Glucose Level Within Desired Range  Outcome: Ongoing, Progressing     Problem: Infection Progression (Sepsis/Septic Shock)  Goal: Absence of Infection Signs and Symptoms  Outcome: Ongoing, Progressing     Problem: Nutrition Impaired (Sepsis/Septic Shock)  Goal: Optimal Nutrition Intake  Outcome: Ongoing, Progressing     Problem: Fluid and Electrolyte Imbalance (Acute Kidney Injury/Impairment)  Goal: Fluid and Electrolyte Balance  Outcome: Ongoing, Progressing     Problem: Oral Intake Inadequate (Acute Kidney Injury/Impairment)  Goal: Optimal Nutrition Intake  Outcome: Ongoing, Progressing     Problem: Renal Function Impairment (Acute Kidney Injury/Impairment)  Goal: Effective Renal Function  Outcome: Ongoing, Progressing

## 2022-07-29 NOTE — PT/OT/SLP PROGRESS
"Physical Therapy Treatment    Patient Name:  Patito Domingo   MRN:  [id number]  Admit Date: 7/12/2022  Admitting Diagnosis: Bacteremia  Recent Surgeries:     General Precautions: Standard, fall   Orthopedic Precautions:N/A   Braces:       Recommendations:     Discharge Recommendations:  home health PT   Level of Assistance Recommended at Discharge: Intermittent assistance   Discharge Equipment Recommendations: bedside commode, bath bench, grab bar, rollator, shower chair, walker, rolling, wheelchair   Barriers to discharge: Decreased caregiver support (increased assistance needed)    Assessment:     Patito Domingo is a 72 y.o. female admitted with a medical diagnosis of Bacteremia . Pt tolerated fairly well, continues to requires education on the importance of PT services, encouragement,  inc OOB tolerance, risk of pressure ulcers/pressure relief, not receptive of information, pt would continue to benefit from skilled PT services to improve overall functional mobility, strength and endurance.  .      Performance deficits affecting function:  weakness, impaired endurance, impaired self care skills, impaired functional mobility, gait instability, impaired balance, decreased lower extremity function, decreased upper extremity function, impaired cardiopulmonary response to activity .    Rehab Potential is good    Activity Tolerance: Fair    Plan:     Patient to be seen 6 x/week to address the above listed problems via gait training, therapeutic activities, therapeutic exercises, neuromuscular re-education, wheelchair management/training    · Plan of Care Expires: 08/02/22  · Plan of Care Reviewed with: patient    Subjective     " want to get back in bed" agreeable to try and get it overwith.     Pain/Comfort:  Pain Rating 1: 9/10  Location - Side 1: Right  Location - Orientation 1: generalized  Location 1: sacral spine  Pain Addressed 1: Pre-medicate for activity, Reposition, Distraction, Cessation of Activity, Nurse " notified  Pain Rating Post-Intervention 1:  (inc with mobility/sitting despite ed on pressure relief and inc OOB tolerance)    Patient's cultural, spiritual, Moravian conflicts given the current situation:  no    Objective:     Communicated with nsg post session. Re pts c/o pain and requesting oitment for buttocks,  Patient found  with  (in wc) upon PT entry to room.     Therapeutic Activities and Exercises: 2x10 reps AP,LAQ,hip flex mini elliptical x 5 min    Functional Mobility:  · Bed Mobility:     · Sit to Supine: minimum assistance and with LLE  · Transfers:  SBA with RW (CGA pants)   · Sit to Stand:  stand by assistance with rolling walker  · Bed to Chair: stand by assistance with  rolling walker  using  Stand Pivot  · Toilet Transfer: stand by assistance with  rolling walker  using  Stand Pivot  · SBA while pt managed brief and pants, set up for hand hygiene josé miguel care in sitting  · Gait: amb with RW vcs for erect posture ~ 100 ft FFP  · Wheelchair Propulsion: ~ 70 ft with BUE S    AM-PAC 6 CLICK MOBILITY  17    Patient left left sidelying with call button in reach and belonging sin reach.    GOALS:   Multidisciplinary Problems     Physical Therapy Goals        Problem: Physical Therapy    Goal Priority Disciplines Outcome Goal Variances Interventions   Physical Therapy Goal     PT, PT/OT Ongoing, Progressing     Description: Goals to be met in 30 days (2022):     Patient will increase functional independence with mobility by performin. Supine to sit with MInimal Assistance.  2. Sit to supine with MInimal Assistance.  3. Rolling to Left and Right with Minimal Assistance.  4. Sit to stand transfer with Contact Guard Assistance. Met  Updated 2022 Sit to stand transfer with (I)  5. Bed to chair transfer with Minimal Assistance using Rolling Walker -met  Updated 2022: Bed to chair transfer with CGA using Rolling Walker.-met  Updated 2022 Bed to chair transfer with (I)  6. Gait x 50  feet with Minimal Assistance using Rolling Walker. Met  Updated 7/27/2022  Gait x 150 feet with SBA/(S) using Rolling Walker.  7. Wheelchair propulsion x 50 feet with Stand-by Assistance using bilateral upper extremities. Met  Updated 7/27/2022 Wheelchair propulsion x 100 feet with Mod I using bilateral upper extremities  8. Ascend/Descend 4 inch curb step with Moderate Assistance using Rolling Walker. Met  Updated 7/27/2022 Ascend/Descend 4 inch curb step with SBAusing Rolling Walker  9. Lower extremity exercise program x20 reps per handout, with supervision. -met  Updated 07/21/2022: Lower extremity exercise program x30 reps per handout, with supervision.                     Time Tracking:     PT Received On: 07/29/22  PT Start Time: 0844  PT Stop Time: 0916  PT Total Time (min): 32 min    Billable Minutes: Gait Training 10 and Therapeutic Activity 22    Treatment Type: Treatment  PT/PTA: PTA     PTA Visit Number: 2     07/29/2022

## 2022-07-29 NOTE — PT/OT/SLP PROGRESS
Occupational Therapy   Treatment    Name: Patito Domingo  MRN: [id number]  Admit Date: 7/12/2022  Admitting Diagnosis:  Bacteremia    General Precautions: Standard, fall   Orthopedic Precautions:N/A   Braces:       Recommendations:     Discharge Recommendations: home health OT  Level of Assistance Recommended at Discharge: 24 hours light assistance for ADL's and homemaking tasks  Discharge Equipment Recommendations:  bedside commode, bath bench, grab bar, walker, rolling, wheelchair  Barriers to discharge:  Decreased caregiver support    Assessment:     Patito Domingo is a 72 y.o. female with a medical diagnosis of Bacteremia She presents with  Performance deficits affecting function are are weakness, impaired endurance, impaired self care skills, impaired functional mobilty, gait instability, pain, edema, decreased lower extremity function.        .   Pt. Was cooperative and participated well with session on this day. Pt. Still continues to complaint of discomfort in [place] area with prolonged  sitting Pt  continues to demonstrate levels of physical deficits with  functional indep with daily management activities tasks, selfcare skills with balance,  functional mobility, UB strength and endurance. Pt. Will continue to benefit from continued OT to progress towards goals     Rehab Potential is fair    Activity tolerance:  Fair    Plan:     Patient to be seen 6 x/week to address the above listed problems via self-care/home management, therapeutic activities, therapeutic exercises, neuromuscular re-education    · Plan of Care Expires: 08/13/22  · Plan of Care Reviewed with: parent    Subjective     Communicated with: Mike  prior to session.I am ready to get up    Pain/Comfort:  Pain Rating 1: 6/10  Location - Side 1: Right  Location - Orientation 1: generalized  Location 1: sacral spine  Pain Addressed 1: Pre-medicate for activity, Reposition    Patient's cultural, spiritual, Temple conflicts given the current  situation:  no    Objective:     Patient found HOB elevated   upon OT entry to room.    Bed Mobility:    · Patient completed Scooting/Bridging with stand by assistance  · Patient completed Supine to Sit with stand by assistance     Functional Mobility/Transfers:  · Patient completed Sit <> Stand Transfer with minimum assistance  with  rolling walker   · Patient completed Bed <> Chair Transfer using Stand Pivot technique with minimum assistance with no assistive device  · Patient completed Toilet Transfer Stand Pivot technique with minimum assistance with  no AD    Activities of Daily Living:  · Grooming: supervision at sink level with grooming needs  · Upper Body Dressing: stand by assistance to doff/lisa pulll over shirt  · Lower Body Dressing: moderate assistance to lisa pants EOB and to mange over hips instance with RW for bal  · Toileting: minimum assistance with cleaning and clothing/diaper management from 3n1 level Pt. Able to perform self cleaning     [facility] 6 Click ADL: 16    Treatment & Education:  Pt edu on role of OT, POC, safety when performing self care tasks , benefit of performing OOB activity, and safety when performing functional transfers and mobility management for preparation with goals to progress towards next level of care    Patient left up in chair with all lines intact and call button in reachEducation:      GOALS:   Multidisciplinary Problems     Occupational Therapy Goals        Problem: Occupational Therapy    Goal Priority Disciplines Outcome Interventions   Occupational Therapy Goal     OT, PT/OT Ongoing, Progressing    Description: Goals to be met by: 30 days     Patient will increase functional independence with ADLs by performing:    Feeding with Modified [place].  UE Dressing with Set-up Assistance.  LE Dressing with Minimal Assistance.  Grooming while seated at sink with Modified [place].  Toileting from toilet or BSC with Minimal Assistance for hygiene and clothing  management.  -MET  Bathing from  sitting at sink with Minimal Assistance.  Supine to sit with Stand-by Assistance.  Step transfer with Contact Guard Assistance with RW. - MET  Upper extremity exercise with supervision.  Caregiver will be educated on level of assist required to safely perform self care tasks and functional transfers..                      Time Tracking:     OT Date of Treatment: 07/29/22  OT Start Time: 0740    OT Stop Time: 0810  OT Total Time (min): 30 min    Billable Minutes:Self Care/Home Management 30 7/29/2022

## 2022-07-29 NOTE — PLAN OF CARE
SW met with pt and spoke to son, Matias Domingo to confirm discharge needs. No HME is needed. Patient's son will transport pt home and continue to provide care for her. Home Health has been referred to [facility]. F/U PCP appt scheduled for 8/16.  D/C set for ? (Son asked to be contacted Monday morning when he will know when he is able to arrive)    Ramona Moreno, [facility]  Case Management Department  [facility] [place]  [email or web address]

## 2022-07-29 NOTE — NURSING
Attempted to flush left arm midline.  Ns fluid leaking around site.  Charge nurse notified.  Removed and applied pressure dressing per MD orders.

## 2022-07-30 PROCEDURE — 25000003 PHARM REV CODE 250: Performed by: HOSPITALIST

## 2022-07-30 PROCEDURE — 25000003 PHARM REV CODE 250: Performed by: NURSE PRACTITIONER

## 2022-07-30 PROCEDURE — 63600175 PHARM REV CODE 636 W HCPCS: Performed by: NURSE PRACTITIONER

## 2022-07-30 PROCEDURE — 11000004 HC SNF PRIVATE

## 2022-07-30 RX ADMIN — OXYCODONE HYDROCHLORIDE 10 MG: 10 TABLET ORAL at 03:07

## 2022-07-30 RX ADMIN — OXYCODONE HYDROCHLORIDE 15 MG: 10 TABLET ORAL at 01:07

## 2022-07-30 RX ADMIN — SENNOSIDES AND DOCUSATE SODIUM 1 TABLET: 50; 8.6 TABLET ORAL at 08:07

## 2022-07-30 RX ADMIN — LIDOCAINE 1 PATCH: 50 PATCH CUTANEOUS at 04:07

## 2022-07-30 RX ADMIN — ENOXAPARIN SODIUM 40 MG: 100 INJECTION SUBCUTANEOUS at 04:07

## 2022-07-30 RX ADMIN — METHENAMINE HIPPURATE 1 G: 1 TABLET ORAL at 08:07

## 2022-07-30 RX ADMIN — MICONAZOLE NITRATE: 20 CREAM TOPICAL at 09:07

## 2022-07-30 RX ADMIN — MENTHOL, METHYL SALICYLATE: 10; 15 CREAM TOPICAL at 09:07

## 2022-07-30 RX ADMIN — OXYCODONE HYDROCHLORIDE 10 MG: 10 TABLET ORAL at 08:07

## 2022-07-30 RX ADMIN — OXYCODONE HYDROCHLORIDE 15 MG: 10 TABLET ORAL at 09:07

## 2022-07-30 RX ADMIN — SENNOSIDES AND DOCUSATE SODIUM 1 TABLET: 50; 8.6 TABLET ORAL at 09:07

## 2022-07-30 RX ADMIN — DICLOFENAC SODIUM 4 G: 10 GEL TOPICAL at 08:07

## 2022-07-30 RX ADMIN — POLYETHYLENE GLYCOL 3350 17 G: 17 POWDER, FOR SOLUTION ORAL at 08:07

## 2022-07-30 RX ADMIN — OXYCODONE HYDROCHLORIDE 15 MG: 10 TABLET ORAL at 05:07

## 2022-07-30 RX ADMIN — CYANOCOBALAMIN TAB 1000 MCG 1000 MCG: 1000 TAB at 08:07

## 2022-07-30 RX ADMIN — OXYCODONE HYDROCHLORIDE AND ACETAMINOPHEN 1000 MG: 500 TABLET ORAL at 08:07

## 2022-07-30 RX ADMIN — MENTHOL, METHYL SALICYLATE: 10; 15 CREAM TOPICAL at 03:07

## 2022-07-30 RX ADMIN — ATORVASTATIN CALCIUM 40 MG: 40 TABLET, FILM COATED ORAL at 06:07

## 2022-07-30 RX ADMIN — Medication 400 MG: at 08:07

## 2022-07-30 RX ADMIN — AMLODIPINE BESYLATE 10 MG: 10 TABLET ORAL at 08:07

## 2022-07-30 RX ADMIN — OXYCODONE HYDROCHLORIDE AND ACETAMINOPHEN 1000 MG: 500 TABLET ORAL at 09:07

## 2022-07-30 RX ADMIN — MICONAZOLE NITRATE: 20 CREAM TOPICAL at 08:07

## 2022-07-30 RX ADMIN — BISACODYL 10 MG: 10 SUPPOSITORY RECTAL at 12:07

## 2022-07-30 RX ADMIN — METHENAMINE HIPPURATE 1 G: 1 TABLET ORAL at 09:07

## 2022-07-30 RX ADMIN — METOPROLOL SUCCINATE 25 MG: 25 TABLET, EXTENDED RELEASE ORAL at 08:07

## 2022-07-30 RX ADMIN — PANTOPRAZOLE SODIUM 40 MG: 40 TABLET, DELAYED RELEASE ORAL at 08:07

## 2022-07-30 RX ADMIN — DICLOFENAC SODIUM 4 G: 10 GEL TOPICAL at 09:07

## 2022-07-30 RX ADMIN — Medication 400 MG: at 09:07

## 2022-07-30 NOTE — PLAN OF CARE
Problem: Adult Inpatient Plan of Care  Goal: Plan of Care Review  Outcome: Ongoing, Progressing  Goal: Patient-Specific Goal (Individualized)  Outcome: Ongoing, Progressing  Goal: Absence of Hospital-Acquired Illness or Injury  Outcome: Ongoing, Progressing  Goal: Optimal Comfort and Wellbeing  Outcome: Ongoing, Progressing     Problem: Adjustment to Illness (Sepsis/Septic Shock)  Goal: Optimal Coping  Outcome: Ongoing, Progressing     Problem: Bleeding (Sepsis/Septic Shock)  Goal: Absence of Bleeding  Outcome: Ongoing, Progressing     Problem: Glycemic Control Impaired (Sepsis/Septic Shock)  Goal: Blood Glucose Level Within Desired Range  Outcome: Ongoing, Progressing     Problem: Infection Progression (Sepsis/Septic Shock)  Goal: Absence of Infection Signs and Symptoms  Outcome: Ongoing, Progressing     Problem: Nutrition Impaired (Sepsis/Septic Shock)  Goal: Optimal Nutrition Intake  Outcome: Ongoing, Progressing     Problem: Fluid and Electrolyte Imbalance (Acute Kidney Injury/Impairment)  Goal: Fluid and Electrolyte Balance  Outcome: Ongoing, Progressing     Problem: Oral Intake Inadequate (Acute Kidney Injury/Impairment)  Goal: Optimal Nutrition Intake  Outcome: Ongoing, Progressing     Problem: Renal Function Impairment (Acute Kidney Injury/Impairment)  Goal: Effective Renal Function  Outcome: Ongoing, Progressing     Problem: Infection  Goal: Absence of Infection Signs and Symptoms  Outcome: Ongoing, Progressing     Problem: Impaired Wound Healing  Goal: Optimal Wound Healing  Outcome: Ongoing, Progressing     Problem: Skin Injury Risk Increased  Goal: Skin Health and Integrity  Outcome: Ongoing, Progressing     Problem: Oral Intake Inadequate  Goal: Improved Oral Intake  Outcome: Ongoing, Progressing     Problem: Fall Injury Risk  Goal: Absence of Fall and Fall-Related Injury  Outcome: Ongoing, Progressing

## 2022-07-31 ENCOUNTER — EXTERNAL CHRONIC CARE MANAGEMENT (OUTPATIENT)
Dept: PRIMARY CARE CLINIC | Facility: CLINIC | Age: 72
End: 2022-07-31
Payer: MEDICARE

## 2022-07-31 PROCEDURE — 99490 PR CHRONIC CARE MGMT, 1ST 20 MIN: ICD-10-PCS | Mod: S$GLB,,, | Performed by: INTERNAL MEDICINE

## 2022-07-31 PROCEDURE — 97116 GAIT TRAINING THERAPY: CPT

## 2022-07-31 PROCEDURE — 99490 CHRNC CARE MGMT STAFF 1ST 20: CPT | Mod: S$GLB,,, | Performed by: INTERNAL MEDICINE

## 2022-07-31 PROCEDURE — 97530 THERAPEUTIC ACTIVITIES: CPT

## 2022-07-31 PROCEDURE — 11000004 HC SNF PRIVATE

## 2022-07-31 PROCEDURE — 63600175 PHARM REV CODE 636 W HCPCS: Performed by: NURSE PRACTITIONER

## 2022-07-31 PROCEDURE — 25000003 PHARM REV CODE 250: Performed by: NURSE PRACTITIONER

## 2022-07-31 PROCEDURE — 25000003 PHARM REV CODE 250: Performed by: HOSPITALIST

## 2022-07-31 RX ADMIN — METHENAMINE HIPPURATE 1 G: 1 TABLET ORAL at 09:07

## 2022-07-31 RX ADMIN — METHENAMINE HIPPURATE 1 G: 1 TABLET ORAL at 08:07

## 2022-07-31 RX ADMIN — LIDOCAINE 1 PATCH: 50 PATCH CUTANEOUS at 05:07

## 2022-07-31 RX ADMIN — OXYCODONE HYDROCHLORIDE 15 MG: 10 TABLET ORAL at 10:07

## 2022-07-31 RX ADMIN — ATORVASTATIN CALCIUM 40 MG: 40 TABLET, FILM COATED ORAL at 05:07

## 2022-07-31 RX ADMIN — SENNOSIDES AND DOCUSATE SODIUM 1 TABLET: 50; 8.6 TABLET ORAL at 09:07

## 2022-07-31 RX ADMIN — OXYCODONE HYDROCHLORIDE 15 MG: 10 TABLET ORAL at 12:07

## 2022-07-31 RX ADMIN — Medication 400 MG: at 08:07

## 2022-07-31 RX ADMIN — Medication 400 MG: at 09:07

## 2022-07-31 RX ADMIN — DICLOFENAC SODIUM 4 G: 10 GEL TOPICAL at 08:07

## 2022-07-31 RX ADMIN — DICLOFENAC SODIUM 4 G: 10 GEL TOPICAL at 09:07

## 2022-07-31 RX ADMIN — MICONAZOLE NITRATE: 20 CREAM TOPICAL at 08:07

## 2022-07-31 RX ADMIN — MENTHOL, METHYL SALICYLATE: 10; 15 CREAM TOPICAL at 02:07

## 2022-07-31 RX ADMIN — OXYCODONE HYDROCHLORIDE 15 MG: 10 TABLET ORAL at 05:07

## 2022-07-31 RX ADMIN — PANTOPRAZOLE SODIUM 40 MG: 40 TABLET, DELAYED RELEASE ORAL at 09:07

## 2022-07-31 RX ADMIN — MENTHOL, METHYL SALICYLATE: 10; 15 CREAM TOPICAL at 09:07

## 2022-07-31 RX ADMIN — ENOXAPARIN SODIUM 40 MG: 100 INJECTION SUBCUTANEOUS at 05:07

## 2022-07-31 RX ADMIN — OXYCODONE HYDROCHLORIDE AND ACETAMINOPHEN 1000 MG: 500 TABLET ORAL at 08:07

## 2022-07-31 RX ADMIN — SENNOSIDES AND DOCUSATE SODIUM 1 TABLET: 50; 8.6 TABLET ORAL at 08:07

## 2022-07-31 RX ADMIN — AMLODIPINE BESYLATE 10 MG: 10 TABLET ORAL at 09:07

## 2022-07-31 RX ADMIN — OXYCODONE HYDROCHLORIDE AND ACETAMINOPHEN 1000 MG: 500 TABLET ORAL at 09:07

## 2022-07-31 RX ADMIN — CYANOCOBALAMIN TAB 1000 MCG 1000 MCG: 1000 TAB at 09:07

## 2022-07-31 RX ADMIN — METOPROLOL SUCCINATE 25 MG: 25 TABLET, EXTENDED RELEASE ORAL at 09:07

## 2022-07-31 RX ADMIN — OXYCODONE HYDROCHLORIDE 15 MG: 10 TABLET ORAL at 06:07

## 2022-07-31 NOTE — PT/OT/SLP PROGRESS
Physical Therapy Treatment and Discharge Summary    Patient Name:  Patito Domingo   MRN:  [id number]  Admit Date: 7/12/2022  Admitting Diagnosis: Bacteremia  Recent Surgeries: N/A    General Precautions: Standard, fall   Orthopedic Precautions:N/A   Braces: N/A     Recommendations:     Discharge Recommendations:  home health PT   Level of Assistance Recommended at Discharge: Intermittent assistance for GT  Discharge Equipment Recommendations: bedside commode, bath bench, grab bar, rollator, shower chair, walker, rolling, wheelchair   Barriers to discharge: Decreased caregiver support (increased assistance needed)    Assessment:     Patito Domingo is a 72 y.o. female admitted with a medical diagnosis of Bacteremia . Pt is appropriate for d/c home tomorrow.      Performance deficits affecting function:  weakness, impaired endurance, impaired self care skills, impaired functional mobility, gait instability, impaired balance, decreased upper extremity function, decreased lower extremity function, impaired cardiopulmonary response to activity .    Rehab Potential is good    Activity Tolerance: Good    Plan:     Patient to be seen 6 x/week to address the above listed problems via gait training, therapeutic activities, therapeutic exercises, neuromuscular re-education, wheelchair management/training    · Plan of Care Expires: 08/02/22  · Plan of Care Reviewed with: patient    Subjective     Pt. Agreeable to work with PT.     Pain/Comfort:  · Pain Rating 1: 0/10  · Pain Rating Post-Intervention 1: 0/10    Patient's cultural, spiritual, Rastafarian conflicts given the current situation:  · no    Objective:     Communicated with pt's nurse prior to session.  Patient found HOB elevated with   upon PT entry to room.         Functional Mobility:  · Bed Mobility:     · Supine to Sit: supervision  · Transfers:     · Sit to Stand:  supervision with no AD and rolling walker  · Bed to Chair: supervision with  no AD  using  Stand  "Pivot  · Gait: ~120ft with RW and SBA 2* to pt with FFT and decrease heidi.  · Stairs:  Pt ascended/descended 4 stair(s) and 4" curb step with Rolling Walker with bilateral handrails with Contact Guard Assistance.   · Wheelchair Propulsion:  Pt propelled Standard wheelchair x 100 feet on Level tile with  Bilateral upper extremity with Supervision or Set-up Assistance.     AM-PAC 6 CLICK MOBILITY  18    Patient left up in chair with call button in reach.    GOALS:   Multidisciplinary Problems     Physical Therapy Goals        Problem: Physical Therapy    Goal Priority Disciplines Outcome Goal Variances Interventions   Physical Therapy Goal     PT, PT/OT Ongoing, Progressing     Description: Goals to be met in 30 days (2022):     Patient will increase functional independence with mobility by performin. Supine to sit with MInimal Assistance.  2. Sit to supine with MInimal Assistance.  3. Rolling to Left and Right with Minimal Assistance.  4. Sit to stand transfer with Contact Guard Assistance. Met  Updated 2022 Sit to stand transfer with (I)  5. Bed to chair transfer with Minimal Assistance using Rolling Walker -met  Updated 2022: Bed to chair transfer with CGA using Rolling Walker.-met  Updated 2022 Bed to chair transfer with (I)  6. Gait x 50 feet with Minimal Assistance using Rolling Walker. Met  Updated 2022  Gait x 150 feet with SBA/(S) using Rolling Walker.  7. Wheelchair propulsion x 50 feet with Stand-by Assistance using bilateral upper extremities. Met  Updated 2022 Wheelchair propulsion x 100 feet with Mod I using bilateral upper extremities  8. Ascend/Descend 4 inch curb step with Moderate Assistance using Rolling Walker. Met  Updated 2022 Ascend/Descend 4 inch curb step with SBAusing Rolling Walker  9. Lower extremity exercise program x20 reps per handout, with supervision. -met  Updated 2022: Lower extremity exercise program x30 reps per handout, with " supervision.                     Time Tracking:     PT Received On: 07/31/22  PT Start Time: 0900  PT Stop Time: 0924  PT Total Time (min): 24 min    Billable Minutes: Gait Training 12mins and Therapeutic Activity 12mins    Treatment Type: Treatment  PT/PTA: PT     PTA Visit Number: 0     07/31/2022

## 2022-08-01 VITALS
DIASTOLIC BLOOD PRESSURE: 58 MMHG | TEMPERATURE: 98 F | OXYGEN SATURATION: 98 % | SYSTOLIC BLOOD PRESSURE: 123 MMHG | HEIGHT: 66 IN | WEIGHT: 173.31 LBS | RESPIRATION RATE: 16 BRPM | HEART RATE: 76 BPM | BODY MASS INDEX: 27.85 KG/M2

## 2022-08-01 PROCEDURE — 25000003 PHARM REV CODE 250: Performed by: HOSPITALIST

## 2022-08-01 PROCEDURE — 25000003 PHARM REV CODE 250: Performed by: NURSE PRACTITIONER

## 2022-08-01 RX ADMIN — MICONAZOLE NITRATE: 20 CREAM TOPICAL at 09:08

## 2022-08-01 RX ADMIN — METHENAMINE HIPPURATE 1 G: 1 TABLET ORAL at 08:08

## 2022-08-01 RX ADMIN — CYANOCOBALAMIN TAB 1000 MCG 1000 MCG: 1000 TAB at 08:08

## 2022-08-01 RX ADMIN — OXYCODONE HYDROCHLORIDE 10 MG: 10 TABLET ORAL at 09:08

## 2022-08-01 RX ADMIN — OXYCODONE HYDROCHLORIDE 15 MG: 10 TABLET ORAL at 04:08

## 2022-08-01 RX ADMIN — METOPROLOL SUCCINATE 25 MG: 25 TABLET, EXTENDED RELEASE ORAL at 08:08

## 2022-08-01 RX ADMIN — AMLODIPINE BESYLATE 10 MG: 10 TABLET ORAL at 08:08

## 2022-08-01 RX ADMIN — SENNOSIDES AND DOCUSATE SODIUM 1 TABLET: 50; 8.6 TABLET ORAL at 08:08

## 2022-08-01 RX ADMIN — PANTOPRAZOLE SODIUM 40 MG: 40 TABLET, DELAYED RELEASE ORAL at 08:08

## 2022-08-01 RX ADMIN — Medication 400 MG: at 08:08

## 2022-08-01 RX ADMIN — OXYCODONE HYDROCHLORIDE AND ACETAMINOPHEN 1000 MG: 500 TABLET ORAL at 08:08

## 2022-08-01 RX ADMIN — DICLOFENAC SODIUM 4 G: 10 GEL TOPICAL at 08:08

## 2022-08-01 RX ADMIN — MENTHOL, METHYL SALICYLATE: 10; 15 CREAM TOPICAL at 09:08

## 2022-08-01 NOTE — NURSING
DISCHARGE INSTRUCTIONS GIVEN AND PATIENT UNDERSTANDS.WILL CONTINUE SKIN CARE TO LEFT BUTTOCK APPROXIMATE NICKEL SIZE SKIN TEAR WITH TRIAD CREAM UNTIL SKIN HEALS.DISCHARGED BY WHEELCHAIR ACCOMPANIED BY PCT TO LOBBY ASSISTED INTO FAMILY VEHICLE.DISCHARGED TO HOME WITH SON.

## 2022-08-01 NOTE — PLAN OF CARE
Problem: Adult Inpatient Plan of Care  Goal: Plan of Care Review  Outcome: Met  Goal: Patient-Specific Goal (Individualized)  Outcome: Met  Goal: Absence of Hospital-Acquired Illness or Injury  Outcome: Met  Goal: Optimal Comfort and Wellbeing  Outcome: Met  Goal: Readiness for Transition of Care  Outcome: Met     Problem: Adult Inpatient Plan of Care  Goal: Plan of Care Review  Outcome: Met  Goal: Patient-Specific Goal (Individualized)  Outcome: Met  Goal: Absence of Hospital-Acquired Illness or Injury  Outcome: Met  Goal: Optimal Comfort and Wellbeing  Outcome: Met  Goal: Readiness for Transition of Care  Outcome: Met     Problem: Adult Inpatient Plan of Care  Goal: Plan of Care Review  Outcome: Met  Goal: Patient-Specific Goal (Individualized)  Outcome: Met  Goal: Absence of Hospital-Acquired Illness or Injury  Outcome: Met  Goal: Optimal Comfort and Wellbeing  Outcome: Met  Goal: Readiness for Transition of Care  Outcome: Met     Problem: Adult Inpatient Plan of Care  Goal: Plan of Care Review  Outcome: Met  Goal: Patient-Specific Goal (Individualized)  Outcome: Met  Goal: Absence of Hospital-Acquired Illness or Injury  Outcome: Met  Goal: Optimal Comfort and Wellbeing  Outcome: Met  Goal: Readiness for Transition of Care  Outcome: Met

## 2022-08-02 NOTE — HOSPITAL COURSE
Patient progressed well with PT and OT- last PT note states that patient ambulated***. Patient had no significant events during their stay at SNF. Home health was set up. DME was ordered if needed. Follow up appointment to be made by patient within one week. All prescriptions and discharge instructions were ordered to be given to the patient prior to discharge.     PEx  Constitutional: Patient appears debilitated.  No distress noted  HENT:   Head: Normocephalic and atraumatic.   Eyes: Pupils are equal, round  Neck: Normal range of motion. Neck supple.   Cardiovascular: Normal rate, regular rhythm and normal heart sounds.    Pulmonary/Chest: Effort normal and breath sounds are clear  Abdominal: Soft. Bowel sounds are normal.   Musculoskeletal: Normal range of motion.   Neurological: Alert and oriented to person, place, and time.   Psychiatric: Normal mood and affect. Behavior is normal.   Skin: Skin is warm and dry.      07/28/22 1000          Altered Skin Integrity 07/07/22 1104 Right Buttocks Shearing Partial thickness tissue loss. Shallow open ulcer with a red or pink wound bed, without slough. Intact or Open/Ruptured Serum-filled blister.   Date First Assessed/Time First Assessed: 07/07/22 1104   Altered Skin Integrity Present on Admission: suspected hospital acquired  Side: Right  Location: Buttocks  Is this injury device related?: No  Primary Wound Type: Shearing  Description of Altere...   Drainage Characteristics/Odor Clear   Appearance Pink;Red;Moist;Epithelialization   Tissue loss description Partial thickness   Periwound Area Intact;Dry;Pink;Scar tissue   Wound Edges Irregular;Open   Wound Length (cm) 2.5 cm   Wound Width (cm) 1.5 cm   Wound Depth (cm) 0.2 cm   Wound Volume (cm^3) 0.75 cm^3   Wound Surface Area (cm^2) 3.75 cm^2   Care Cleansed with:;Sterile normal saline;Applied:;Skin Barrier   Dressing Changed;Island/border   Dressing Change Due 07/28/22

## 2022-08-02 NOTE — DISCHARGE SUMMARY
"[facility] Medicine  Discharge Summary      Patient Name: Patito Domingo  MRN: [id number]  Patient Class: IP- SNF  Admission Date: 7/12/2022  Hospital Length of Stay: 20 days  Discharge Date and Time: 8/1/2022  Attending Physician: No att. providers found   Discharging Provider: Shirley Canas NP  Primary Care Provider: Mariela Wei DO      HPI:   Mrs. 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  Admission to SNF for secondary weakness and debility.      Patient originally presented to [facility] ED on 07/02 with worsening generalized weakness and lethargy x 2 days. Per [facility], "She has also noticed abd pain, n/v, increased urinary frequency/urgency and fell at home last night but denies LOC. Was prescribed keflex yesterday for UTI. Pt noted to be very somnolent and minimally conversant upon arrival to ED. Workup significant for +UA, leukocytosis, lactic acidosis, FLORECITA, elevated troponin. CT abd/pelvis with no acute findings; unchanged size of carcinoid tumor with liver mets and lymphadenopathy. Started on meropenem for UTI due to recent urine culture growing ESBL klebsiella. Mental status has since improved and patient now alert and oriented x4. She was admitted with a diagnosis of sepsis 2/2 UTI, FLORECITA and non-traumatic rhabdomyolysis.  Urine and blood cultures were obtained and she was placed on IV meropenem.  Her admitting troponin was elevated and cardiology was consulted.  An echocardiogram was obtained revealing normal ejection fraction with normal wall motion.  Her troponin trended down.  She was given gentle IVF and her FLORECITA as well as rhabdomyolysis improved.  Her blood culture was positive for GNR, and repeat cultures were drawn.  Urine culture was positive for ESCHERICHIA COLI, sensitive to Rocephin and abx were de-escalated " "from meropenem to IV rocephin.  PT/OT were consulted.  Pt c/o right hip pain so an xray was obtained which was negative for fracture.  She c/o swelling to RLE.  An ultrasound of the BLE was performed which showed no evidence of DVT.  She developed intermittent low grade fevers despite abx therapy and ID was consulted.  Per ID, presence of large bilateral renal stones complicates picture, although non-obstructive.  Previously assessed by urology, but decided against stone retrieval due to risks of percutaneous nephrolithotomy, and stones not clearly causing UTIs.  ID recommendations are for IV rocephin 1gm q24h x 2 weeks, start date 7/2/22.  Plan DC to SNF with PICC or midline for continued abx treatment.  Pt accepted to [facility] and discharge there for rehab and completion of IV antibiotics."     Today, patient reports RLE pain.  Pain similar to the pain she was having at [facility].  States "muscle rub cream" helps, similar to Atul.  Also taking PO pain medication.     Patient will be treated at [facility] with PT and OT to improve functional status and ability to perform ADLs.     Hospital Course:   Patient progressed well with PT and OT- last PT note states that patient ambulatedwith RW vcs for erect posture ~ 100 ft FFP. Patient had no significant events during their stay at [facility].  Patient completed her IV antibiotics.  Patient's pain was difficult to control to her RLE.  Upon patient's previous SNF admissions she also had difficult to control pain.  Home health was set up. DME was ordered if needed. Follow up appointment to be made by patient within one week. All prescriptions and discharge instructions were ordered to be given to the patient prior to discharge.     PEx  Constitutional: Patient appears debilitated.  No distress noted  HENT:   Head: Normocephalic and atraumatic.   Eyes: Pupils are equal, round  Neck: Normal range of motion. Neck supple.   Cardiovascular: Normal rate, regular rhythm and normal " heart sounds.    Pulmonary/Chest: Effort normal and breath sounds are clear  Abdominal: Soft. Bowel sounds are normal.   Musculoskeletal: Normal range of motion.   Neurological: Alert and oriented to person, place, and time.   Psychiatric: Normal mood and affect. Behavior is normal.   Skin: Skin is warm and dry.      07/28/22 1000          Altered Skin Integrity 07/07/22 1104 Right Buttocks Shearing Partial thickness tissue loss. Shallow open ulcer with a red or pink wound bed, without slough. Intact or Open/Ruptured Serum-filled blister.   Date First Assessed/Time First Assessed: 07/07/22 1104   Altered Skin Integrity Present on Admission: suspected hospital acquired  Side: Right  Location: Buttocks  Is this injury device related?: No  Primary Wound Type: Shearing  Description of Altere...   Drainage Characteristics/Odor Clear   Appearance Pink;Red;Moist;Epithelialization   Tissue loss description Partial thickness   Periwound Area Intact;Dry;Pink;Scar tissue   Wound Edges Irregular;Open   Wound Length (cm) 2.5 cm   Wound Width (cm) 1.5 cm   Wound Depth (cm) 0.2 cm   Wound Volume (cm^3) 0.75 cm^3   Wound Surface Area (cm^2) 3.75 cm^2   Care Cleansed with:;Sterile normal saline;Applied:;Skin Barrier   Dressing Changed;Island/border   Dressing Change Due 07/28/22                   Goals of Care Treatment Preferences:  Code Status: Full Code      Consults:   Consults (From admission, onward)        Status Ordering Provider     Inpatient consult to Registered Dietitian/Nutritionist  Once        Provider:  (Not yet assigned)    Completed NURY SOSA     Inpatient consult to Registered Dietitian/Nutritionist  Once        Provider:  (Not yet assigned)    Completed NUYR SOSA     Inpatient consult to Registered Dietitian/Nutritionist  Once        Provider:  (Not yet assigned)    Completed NURY SOSA     IP consult to case management  Once        Provider:  (Not yet assigned)    Completed IAN  "NURY VALARIERylan          No new Assessment & Plan notes have been filed under this hospital service since the last note was generated.  Service: Hospital Medicine    Final Active Diagnoses:    Diagnosis Date Noted POA    PRINCIPAL PROBLEM:  Bacteremia [R78.81] 07/04/2022 Yes    NSTEMI (non-ST elevated myocardial infarction) [I21.4] 07/03/2022 Yes    Malignant carcinoid tumor of midgut [C7A.095] 03/27/2022 Yes    Chronic kidney disease, stage 4 (severe) [N18.4] 03/17/2022 Yes    Chronic diastolic (congestive) heart failure [I50.32] 03/02/2022 Yes    HLD (hyperlipidemia) [E78.5] 03/08/2021 Yes    Metastatic carcinoid tumor [C7B.00] 01/27/2015 Yes     Chronic      Problems Resolved During this Admission:       Discharged Condition: good    Disposition: Home-Health Care [facility] [place] [facility]    Follow Up:   Follow-up Information     Gonzales Bishop MD .    Specialty: Hematology and Oncology  Contact information:  [address] [facility] [address]  [address] [place] [address]  [phone]             Home Health Follow up.    Why: Your Home Health company will continue your Physical and Occupational Therapy treatments in your home and send nursing or aides as needed. If you have not been contacted to schedule your first session within two business days of discharge, please contact the company listed here or Ramona Moreno [facility] [phone].  Contact information:  [facility]  [phone]                     Patient Instructions:      WALKER FOR HOME USE     Order Specific Question Answer Comments   Type of Walker: Adult (5'4"-6'6")    With wheels? Yes    Height: 5' 6" (1.676 m)    Weight: 86.2 kg (190 lb 0.6 oz)    Length of need (1-99 months): 99    Does patient have medical equipment at home? 3-in-1 commode    Does patient have medical equipment at home? walker, rolling    Does patient have medical equipment at home? rollator    Does patient have medical equipment at home? wheelchair    Does patient have medical equipment at home? bath " "bench    Please check all that apply: Patient's condition impairs ambulation.    Please check all that apply: Walker will be used for gait training.    Please check all that apply: Patient is unable to safely ambulate without equipment.      WHEELCHAIR FOR HOME USE     Order Specific Question Answer Comments   Hours in W/C per day: 4    Type of Wheelchair: Lightweight    Patient unable to propel in Standard wheelchair? Yes    Size(Width): 20"    Leg Support: STD footrests    Leg Support: Swing Away    Arm Height: Detachable    Arm Height: Full length    Lap Belt: Velcro    Accessories: Heel loops    Accessories: Front brakes    Accessories: Anti-tippers    Accessories: Safety belt    Cushion: Basic    Justification for cushion: Prevent pressure ulcers    Height: 5' 6" (1.676 m)    Weight: 86.2 kg (190 lb 0.6 oz)    Does patient have medical equipment at home? 3-in-1 commode    Does patient have medical equipment at home? walker, rolling    Does patient have medical equipment at home? rollator    Does patient have medical equipment at home? wheelchair    Does patient have medical equipment at home? bath bench    Length of need (1-99 months): 99    Please check all that apply: Caregiver is capable and willing to operate wheelchair safely.    Please check all that apply: The patient requires the use of a w/c for activities of daily living within the Home.    Please check all that apply: Patient mobility limitations cannot be sufficiently resolved by the use of other ambulatory therapies.      3 IN 1 COMMODE FOR HOME USE     Order Specific Question Answer Comments   Type: Standard    Height: 5' 6" (1.676 m)    Weight: 86.2 kg (190 lb 0.6 oz)    Does patient have medical equipment at home? 3-in-1 commode    Does patient have medical equipment at home? walker, rolling    Does patient have medical equipment at home? rollator    Does patient have medical equipment at home? wheelchair    Does patient have medical equipment " "at home? bath bench    Length of need (1-99 months): 99      TRANSFER TUB BENCH FOR HOME USE     Order Specific Question Answer Comments   Type of Transfer Tub Bench: Unpadded    Height: 5' 6" (1.676 m)    Weight: 86.2 kg (190 lb 0.6 oz)    Does patient have medical equipment at home? 3-in-1 commode    Does patient have medical equipment at home? walker, rolling    Does patient have medical equipment at home? rollator    Does patient have medical equipment at home? wheelchair    Does patient have medical equipment at home? bath bench    Length of need (1-99 months): 99    Patient notified - Not covered by insurance considered a convenience item Yes    Discussed financial responsibility with responsible party Yes      BATH/SHOWER CHAIR FOR HOME USE     Order Specific Question Answer Comments   Height: 5' 6" (1.676 m)    Weight: 86.2 kg (190 lb 0.6 oz)    Does patient have medical equipment at home? 3-in-1 commode    Does patient have medical equipment at home? walker, rolling    Does patient have medical equipment at home? rollator    Does patient have medical equipment at home? wheelchair    Does patient have medical equipment at home? bath bench    Length of need (1-99 months): 99    Type: With back      No driving until:   Order Comments: Cleared by PCP     Notify your health care provider if you experience any of the following:  temperature >100.4     Notify your health care provider if you experience any of the following:  persistent nausea and vomiting or diarrhea     Notify your health care provider if you experience any of the following:  severe uncontrolled pain     Notify your health care provider if you experience any of the following:  redness, tenderness, or signs of infection (pain, swelling, redness, odor or green/yellow discharge around incision site)     Notify your health care provider if you experience any of the following:  difficulty breathing or increased cough     Notify your health care " provider if you experience any of the following:  severe persistent headache     Notify your health care provider if you experience any of the following:  worsening rash     Notify your health care provider if you experience any of the following:  persistent dizziness, light-headedness, or visual disturbances     Notify your health care provider if you experience any of the following:  increased confusion or weakness     Activity as tolerated           Pending Diagnostic Studies:     None         Medications:  Reconciled Home Medications:      Medication List      START taking these medications    methyl salicylate-menthol 15-10% 15-10 % Crea  Apply topically 3 (three) times daily.     polyethylene glycol 17 gram Pwpk  Commonly known as: GLYCOLAX  Take 17 g by mouth once daily.        CHANGE how you take these medications    oxyCODONE 10 mg Tab immediate release tablet  Commonly known as: ROXICODONE  Take 1 tablet (10 mg total) by mouth every 4 (four) hours as needed for Pain.  What changed: when to take this        CONTINUE taking these medications    amLODIPine 10 MG tablet  Commonly known as: NORVASC  Take 1 tablet (10 mg total) by mouth once daily.     ascorbic acid (vitamin C) 1000 MG tablet  Commonly known as: VITAMIN C  Take 1 tablet (1,000 mg total) by mouth 2 (two) times daily.     atorvastatin 40 MG tablet  Commonly known as: LIPITOR  Take 1 tablet (40 mg total) by mouth every evening.     baclofen 10 MG tablet  Commonly known as: LIORESAL  Take 1 tablet (10 mg total) by mouth 2 (two) times daily as needed (muscle cramps).     cyanocobalamin 1000 MCG tablet  Commonly known as: VITAMIN B-12  Take 1 tablet (1,000 mcg total) by mouth once daily.     dicyclomine 20 mg tablet  Commonly known as: BENTYL  TAKE 1 TABLET(20 MG) BY MOUTH THREE TIMES DAILY AS NEEDED FOR ABDOMINAL PAIN     gabapentin 300 MG capsule  Commonly known as: NEURONTIN  Take 1 capsule (300 mg total) by mouth every evening.     magnesium oxide  400 mg (241.3 mg magnesium) tablet  Commonly known as: MAG-OX  Take 1 tablet (400 mg total) by mouth 2 (two) times daily.     meclizine 25 mg tablet  Commonly known as: ANTIVERT  Take 1 tablet (25 mg total) by mouth 3 (three) times daily as needed for Dizziness.     methenamine 1 gram Tab  Commonly known as: HIPREX  Take 1 tablet (1 g total) by mouth 2 (two) times daily.     metoprolol succinate 25 MG 24 hr tablet  Commonly known as: TOPROL-XL  Take 1 tablet (25 mg total) by mouth 2 (two) times daily.     miconazole NITRATE 2 % 2 % top powder  Commonly known as: MICOTIN  Apply topically 2 (two) times daily. for 10 days     pantoprazole 40 MG tablet  Commonly known as: PROTONIX  Take 1 tablet (40 mg total) by mouth once daily.     senna-docusate 8.6-50 mg 8.6-50 mg per tablet  Commonly known as: PERICOLACE  Take 1 tablet by mouth 2 (two) times daily.        STOP taking these medications    cefTRIAXone 1 g/50 mL Pgbk IVPB  Commonly known as: ROCEPHIN     diclofenac sodium 1 % Gel  Commonly known as: VOLTAREN     LIDOcaine 5 %  Commonly known as: LIDODERM     phenazopyridine 100 MG tablet  Commonly known as: PYRIDIUM        ASK your doctor about these medications    losartan 100 MG tablet  Commonly known as: COZAAR  TAKE 1 TABLET (100 MG TOTAL) BY MOUTH ONCE DAILY.            Indwelling Lines/Drains at time of discharge:   Lines/Drains/Airways     Drain  Duration           Female External Urinary Catheter 07/03/22 0100 30 days                Time spent on the discharge of patient: 38 minutes         Shirley Canas NP  Department of [facility] Medicine  [facility]

## 2022-08-04 RX ORDER — DOXYCYCLINE 100 MG/1
100 CAPSULE ORAL EVERY 12 HOURS
Qty: 14 CAPSULE | Refills: 0 | Status: SHIPPED | OUTPATIENT
Start: 2022-08-04 | End: 2022-08-11

## 2022-08-08 ENCOUNTER — TELEPHONE (OUTPATIENT)
Dept: FAMILY MEDICINE | Facility: CLINIC | Age: 72
End: 2022-08-08
Payer: MEDICARE

## 2022-08-08 ENCOUNTER — OUTPATIENT CASE MANAGEMENT (OUTPATIENT)
Dept: ADMINISTRATIVE | Facility: OTHER | Age: 72
End: 2022-08-08
Payer: MEDICARE

## 2022-08-08 ENCOUNTER — HOSPITAL ENCOUNTER (EMERGENCY)
Facility: HOSPITAL | Age: 72
Discharge: HOME | End: 2022-08-08
Attending: STUDENT IN AN ORGANIZED HEALTH CARE EDUCATION/TRAINING PROGRAM
Payer: MEDICARE

## 2022-08-08 VITALS
TEMPERATURE: 98 F | BODY MASS INDEX: 27.8 KG/M2 | OXYGEN SATURATION: 100 % | HEART RATE: 89 BPM | RESPIRATION RATE: 20 BRPM | WEIGHT: 173 LBS | HEIGHT: 66 IN | SYSTOLIC BLOOD PRESSURE: 180 MMHG | DIASTOLIC BLOOD PRESSURE: 83 MMHG

## 2022-08-08 DIAGNOSIS — L89.312 PRESSURE INJURY OF RIGHT BUTTOCK, STAGE 2: Primary | ICD-10-CM

## 2022-08-08 DIAGNOSIS — C7B.00 METASTATIC CARCINOID TUMOR: Primary | ICD-10-CM

## 2022-08-08 PROCEDURE — 25000003 PHARM REV CODE 250: Performed by: STUDENT IN AN ORGANIZED HEALTH CARE EDUCATION/TRAINING PROGRAM

## 2022-08-08 PROCEDURE — 25000003 PHARM REV CODE 250: Performed by: EMERGENCY MEDICINE

## 2022-08-08 PROCEDURE — 99283 EMERGENCY DEPT VISIT LOW MDM: CPT

## 2022-08-08 RX ORDER — OXYCODONE HYDROCHLORIDE 5 MG/1
10 TABLET ORAL
Status: COMPLETED | OUTPATIENT
Start: 2022-08-08 | End: 2022-08-08

## 2022-08-08 RX ORDER — HYDROMORPHONE HYDROCHLORIDE 2 MG/1
2 TABLET ORAL ONCE
Status: COMPLETED | OUTPATIENT
Start: 2022-08-08 | End: 2022-08-08

## 2022-08-08 RX ADMIN — OXYCODONE 10 MG: 5 TABLET ORAL at 04:08

## 2022-08-08 RX ADMIN — HYDROMORPHONE HYDROCHLORIDE 2 MG: 2 TABLET ORAL at 06:08

## 2022-08-08 NOTE — DISCHARGE INSTRUCTIONS
Please return to the emergency department if there is worsening or extension of your wound.  Additionally, if you develops fevers, chills or pus-like discharge from the wound, please return to the emergency department.  You are being given a referral to wound care.  Please call within the next 1-2 days to schedule an appointment.

## 2022-08-08 NOTE — TELEPHONE ENCOUNTER
----- Message from Marcie Thrasher RN sent at 8/8/2022 12:02 PM CDT -----  Regarding: need orders for   Please fax order for  to go to  home for community resources and assistance with community choice [employer] program.    Fax number is [phone]  Thank you,  Marcie Thrasher RN,[facility]  Outpatient Complex Case Management  [phone]

## 2022-08-08 NOTE — TELEPHONE ENCOUNTER
----- Message from Cecilia Moreno sent at 8/8/2022  1:54 PM CDT -----  Type:  Patient Returning Call    Who Called:[facility]  Who Left Message for Patient:office  Does the patient know what this is regarding?orders for a   Would the patient rather a call back or a response via [facility]? call  Best Call Back Number:[phone] are  Additional Information:

## 2022-08-08 NOTE — ED NOTES
Pt. Arrives via ems from from home. She reports she has a wound to her buttocks and is receiving wound care at home. She is currently on antibiotics but reports the wound is not improving and she is requesting a wound check. She also reports a burning/stinging pain to the ulcer. The wound is covered with  A Mepilex dressing. Photo of wound was taken by ED MD on exam and placed in media sections of chart. Pt. Ambulates with a walker. She reports the initial insult to tissue was from a fall.

## 2022-08-08 NOTE — ED PROVIDER NOTES
Encounter Date: 2022       History     Chief Complaint   Patient presents with    Wound Check     Brought to ED from home for wound check. Pt has pressure ulcer to buttocks and denies improvement with abx. Pt ambulates with a walker at home.      A 72-year-old female with a past medical history of hypertension, HFpEF and carcinoid tumor presenting to the emergency department for pain 2/2 R buttock ulcer. The pain has been present x 2 weeks and it developed while she was recently admitted to the hospital. There has been no drainage from the wound. She denies fevers/chills. Has taken oxicodone for the pain, which did give her significant relief.        Review of patient's allergies indicates:   Allergen Reactions    Contrast media Hives, Itching and Swelling    Epinephrine Anaphylaxis     Can cause  a Carcinoid Crisis    Ibuprofen Hives, Itching and Swelling    Zofran [ondansetron hcl] Itching     And multiple other reactions    Iodinated contrast media     Morphine Other (See Comments)    Sulfa (sulfonamide antibiotics) Hives, Itching and Swelling     Past Medical History:   Diagnosis Date    Allergy     Arthritis     Cataract     Chronic diastolic (congestive) heart failure     Colon cancer     Encounter for blood transfusion     History of ESBL E. 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)      Past Surgical History:   Procedure Laterality Date    ABDOMINAL SURGERY      CATARACT EXTRACTION Left 10/2017     SECTION      CHOLECYSTECTOMY      COLON SURGERY      cystoscope      CYSTOSCOPY W/ RETROGRADES Right 10/10/2019    Procedure: CYSTOSCOPY, WITH RETROGRADE PYELOGRAM;  Surgeon: Gen Isbell MD;  Location: [facility];  Service:  Urology;  Laterality: Right;    ERCP N/A 11/24/2021    Procedure: ERCP (ENDOSCOPIC RETROGRADE CHOLANGIOPANCREATOGRAPHY);  Surgeon: Jayjay Rivera MD;  Location: [address]);  Service: Endoscopy;  Laterality: N/A;    ERCP N/A 3/4/2022    Procedure: ERCP (ENDOSCOPIC RETROGRADE CHOLANGIOPANCREATOGRAPHY);  Surgeon: Roby Virgen MD;  Location: [facility] ([address] [place] [address]);  Service: Endoscopy;  Laterality: N/A;    EYE SURGERY      HYSTERECTOMY  5/1996    LITHOTRIPSY      LIVER BIOPSY  9/14    carcinoid    URETEROSCOPY Right 10/10/2019    Procedure: URETEROSCOPY;  Surgeon: Gen Isbell MD;  Location: [facility];  Service: Urology;  Laterality: Right;    UTERINE FIBROID SURGERY       Family History   Problem Relation Age of Onset    Cancer Mother         unknown    Alzheimer's disease Father     Stroke Sister     No Known Problems Son     No Known Problems Son     No Known Problems Son     No Known Problems Son     Kidney disease Neg Hx      Social History     Tobacco Use    Smoking status: Never Smoker    Smokeless tobacco: Never Used   Substance Use Topics    Alcohol use: No     Alcohol/week: 0.0 standard drinks    Drug use: No     Review of Systems   Constitutional: Negative for activity change, chills and fever.   HENT: Negative for congestion, ear pain and sore throat.    Respiratory: Negative for shortness of breath and stridor.    Cardiovascular: Negative for chest pain and palpitations.   Gastrointestinal: Negative for abdominal pain, nausea and vomiting.   Genitourinary: Negative for dysuria and urgency.   Musculoskeletal: Negative for back pain.   Skin: Positive for wound (ulcer R buttock). Negative for rash.   Neurological: Negative for dizziness, syncope, weakness and headaches.   Hematological: Does not bruise/bleed easily.       Physical Exam     Initial Vitals [08/08/22 1452]   BP Pulse Resp Temp SpO2   (!) 142/76 81 19 98.6 °F (37 °C) 99 %      MAP       --         Physical  Exam    Nursing note and vitals reviewed.  Constitutional: She appears well-developed. She is not diaphoretic. She is Obese . No distress.   Uncomfortable appearing.  Speaking full sentences.  No acute distress.   HENT:   Head: Normocephalic and atraumatic.   Right Ear: External ear normal.   Left Ear: External ear normal.   Neck: Neck supple.   Cardiovascular: Normal rate, regular rhythm, normal heart sounds and intact distal pulses.   Pulmonary/Chest: Breath sounds normal. No respiratory distress. She has no wheezes. She has no rhonchi. She has no rales.   Abdominal: Abdomen is soft. She exhibits no distension. There is no abdominal tenderness. There is no rebound and no guarding.   Musculoskeletal:      Cervical back: Neck supple.     Neurological: She is alert and oriented to person, place, and time. GCS score is 15. GCS eye subscore is 4. GCS verbal subscore is 5. GCS motor subscore is 6.   Skin: Skin is warm. Capillary refill takes less than 2 seconds. No rash noted.   Approximate 5 cm x 2 cm stage 2 ulcer to R buttock without extension into gluteal cleft. No drainage from wound.   Psychiatric: She has a normal mood and affect.             ED Course   Procedures  Labs Reviewed - No data to display       Imaging Results    None          Medications   oxyCODONE immediate release tablet 10 mg (10 mg Oral Given 8/8/22 1608)   HYDROmorphone tablet 2 mg (2 mg Oral Given 8/8/22 1857)     Medical Decision Making:   History:   I obtained history from: someone other than patient.  Old Medical Records: I decided to obtain old medical records.  Initial Assessment:   Emergent evaluation of pain 2/2 right buttock ulcer.  She is afebrile and hemodynamically stable.  Differential Diagnosis:   Stage 2 ulcer, doubt cellulitis, doubt abscess  ED Management:  Per chart review, patient's ulcer appears to be healing and is improved based on media/photo from 7/12/22. I do not have concern for sepsis. Exam inconsistent with  cellulitis or abscess formation. She was given referral to wound care. Discussed strict ED return precautions with patient and she expressed understanding.            Attending Attestation:   Physician Attestation Statement for Resident:  As the supervising MD   Physician Attestation Statement: I have personally seen and examined this patient.   I agree with the above history.  - with the following exceptions: 72 year old female who presents with pain suspected to be related to her buttock pressure ulcer. On review of the EMR, the ulcer today appears markedly improved from previous and without signs for infection. No overlying erythema, induration, fluctuance, or excessive fibrinous exudates. No overlying granulation tissue with underlying infectious matreriak. Pt is afebrile, HDS< and without signs for vasodilatory or septic hypoperfusion. No palpable creptius, bullae, pain beyond margins of the ulcer. Superficial without deopth concerning for ostemyelitis. Analgesics provided. Wound care referral placed. Long discussion regarding expectations in the emergency department. Will discharge home in stable condition.                         Clinical Impression:   Final diagnoses:  [L89.312] Pressure injury of right buttock, stage 2 (Primary)          ED Disposition Condition    Discharge Stable        ED Prescriptions     None        Follow-up Information     Follow up With Specialties Details Why Contact Info Additional Information    [facility] Wound Care Call in 1 day As needed, If symptoms worsen [address] [facility] [address]  [facility] [address] [phone] Please park in [address] or [address] and use the [address] entrance. Check in at [facility] [address].           Edwin Bui MD  Resident  08/08/22 8923       Diogenes Meyers MD  08/09/22 1225

## 2022-08-08 NOTE — PLAN OF CARE
Sw spoke with pt at bedside. Pt is current with [facility]   [facility]  [address] [place] [address]  Phone: [phone]    Outpatient Case Management Marcie will also follow up with pt. SW provided emotional support regarding pt's complaints and pain.     Future Appointments   Date Time Provider Department Center   8/16/2022 11:20 AM Mariela Wei DO [facility] [place]   9/1/2022  9:20 AM Ervin Parikh MD [facility] UROLOGY [facility]   9/21/2022 10:40 AM Mariela Wei DO [facility] [place]        08/08/22 1806   Discharge Assessment   Assessment Type Final Discharge Note   Source of Information patient     [place] [facility] [place]  [phone]

## 2022-08-08 NOTE — TELEPHONE ENCOUNTER
Called [facility] and spoke with Marcie in regards of message on patient. She informed me that patient is needing an order for a  to go into the pt's home and help with a community choice waver status and with patient's personal care as well. Please advise message.    Fax Number: [phone].

## 2022-08-08 NOTE — PROGRESS NOTES
Outpatient Care Management  Plan of Care Follow Up Visit    Patient: Patito Domingo  MRN: [id number]  Date of Service: 08/08/2022  Completed by: Marcie Thrasher RN  Referral Date: 02/10/2022  Program:     Reason for Visit   Patient presents with    OPCM Chart Review    Follow-up    Update Plan Of Care       Brief Summary:   Call to [facility] health [facility] and spoke with stephane.  And she advising that pt is experiencing diarrhea and is often in need of assistance  wound is 2.2 x 2.2 x .02 with purulent drainage placing xerofoam dressing.      Addendum  Noted pt to ed for co pain n the r buttock wound  pic taken and measurements noted   Approximate 5 cm x 2 cm stage 2 ulcer to R buttock without extension into gluteal cleft. No drainage from wound.     Next steps from previous encounter  Fu on use of vit c , probiotics and methenamine pt states she is taking  Fu on receipt of cushion pt received and has not used yet  Fu on medicaid wavier status  Fu on any symptoms of uti  Fu on wound and home health comment about abcess  Present case at case review    Interventions  Chart reviewed  Reviewed upcoming appt schedule    Assess/review short term goals met       Next steps  Fu on coa for in home assistance with bath from coa   Fu with home health and  for status of wavier program  Fu on message to pcp need for  in the home for personal care  Fu on receipt of communication from states on waview status  Fu on status of wound from home health nurse  Fu on  contact      Patient Summary     Involvement of Care:  Do I have permission to speak with other family members about your care?       Patient Reported Labs & Vitals:  1.  Any Patient Reported Labs & Vitals?     2.  Patient Reported Blood Pressure:     3.  Patient Reported Pulse:     4.  Patient Reported Weight (Kg):     5.  Patient Reported Blood Glucose (mg/dl):       Medical and social history was reviewed with patient and/or  caregiver.     Clinical Assessment     Reviewed and provided basic information on available community resources for mental health, transportation, wellness resources, and palliative care programs with patient and/or caregiver.     Complex Care Plan     Care plan was discussed and completed today with input from patient and/or caregiver.    Patient Instructions     Instructions were provided via the [employer] patient resources and are available for the patient to view on the patient portal.    Todays OPCM Self-Management Care Plan was developed with the patients/caregivers input and was based on identified barriers from todays assessment.  Goals were written today with the patient/caregiver and the patient has agreed to work towards these goals to improve his/her overall well-being. Patient verbalized understanding of the care plan, goals, and all of today's instructions. Encouraged patient/caregiver to communicate with his/her physician and health care team about health conditions and the treatment plan.  Provided my contact information today and encouraged patient/caregiver to call me with any questions as needed.

## 2022-08-09 NOTE — ED NOTES
Pt. States son is here to pick her up. V.s.s.. moderate pain relief currently. Pt. Discharged per wheelchair, stable without distress.

## 2022-08-09 NOTE — ED NOTES
Pt. Is sitting in wheelchair at nurses station. She states her son is on the way to pick her up for discharge.

## 2022-08-09 NOTE — PROVIDER PROGRESS NOTES - EMERGENCY DEPT.
Encounter Date: 8/8/2022    ED Physician Progress Notes        Physician Note:   This woman is awaiting transport I assumed her care from the previous emergency provider.  She is having persistent pain, is chronically on narcotic pain medication for chronic pain.  Administered a dose of oral pain medication while awaiting transport.  No other intervention required.

## 2022-08-09 NOTE — ED NOTES
Pt. Ambulating to bathroom without difficulty. She reports minimal pain relief from earlier medication. MD updated.

## 2022-08-09 NOTE — ED NOTES
Pt. Given a regular diet. Tolerating well. Pt. Updated on request for discharge transport placed.

## 2022-08-10 ENCOUNTER — TELEPHONE (OUTPATIENT)
Dept: FAMILY MEDICINE | Facility: CLINIC | Age: 72
End: 2022-08-10
Payer: MEDICARE

## 2022-08-10 NOTE — TELEPHONE ENCOUNTER
----- Message from Ran Ma sent at 8/10/2022  2:24 PM CDT -----  Type:  Needs Medical Advice    Who Called: self  Reason:requesting sooner appoiuntment   Would the patient rather a call back or a response via [facility]? call  Best Call Back Number: [phone]

## 2022-08-10 NOTE — TELEPHONE ENCOUNTER
----- Message from Cecilia Moreno sent at 8/10/2022  3:22 PM CDT -----  Type:  Needs Medical Advice    Who Called: pt  Symptoms (please be specific): pt is requesting to get a call back from this office     Would the patient rather a call back or a response via [facility]? call  Best Call Back Number: [phone]  Additional Information:

## 2022-08-10 NOTE — TELEPHONE ENCOUNTER
Called and spoke with pt in regards of her message. Pt was calling to get an sooner appt for a hospital follow up and to be seen for a wound as well. Informed pt that she has a hospital follow up appt on 8/16/2022 to come and see Dr. Wei. Pt verbalized understanding of message.

## 2022-08-15 ENCOUNTER — OUTPATIENT CASE MANAGEMENT (OUTPATIENT)
Dept: ADMINISTRATIVE | Facility: OTHER | Age: 72
End: 2022-08-15
Payer: MEDICARE

## 2022-08-15 ENCOUNTER — DOCUMENT SCAN (OUTPATIENT)
Dept: HOME HEALTH SERVICES | Facility: HOSPITAL | Age: 72
End: 2022-08-15
Payer: MEDICARE

## 2022-08-15 RX ORDER — FLUCONAZOLE 150 MG/1
150 TABLET ORAL DAILY
Qty: 7 TABLET | Refills: 0 | Status: SHIPPED | OUTPATIENT
Start: 2022-08-15 | End: 2022-08-22

## 2022-08-15 NOTE — PROGRESS NOTES
Outpatient Care Management  Plan of Care Follow Up Visit    Patient: Patito Domingo  MRN: [id number]  Date of Service: 08/15/2022  Completed by: Marcie Thrasher RN  Referral Date: 08/12/2022  Program:     Reason for Visit   Patient presents with    OPCM Chart Review    Follow-up    Update Plan Of Care       Brief Summary:   Case presentation to team  Suggestions   uro gyn referral, palliative care referral for pain managment,  Second opinion for stone retrieval dr lu    Noted new referral for opcm  How do I close this out  Fu on referral to chemo for fu on neuroendocrine tumor  Pt fu with gastro as advised in 6/14 gave her bentyl and protonix  Did not help  Pt needs to fu with oncology  Pain management      Next steps from previous encounter      Interventions  Chart reviewed  Reviewed upcoming appt schedule    Assess/review short term goals met       Next steps  Fu on referral to chemo for continued treatment of neuroendocrine tumor  Fu on possible second opinion for stone extraction or other treatment  Fu possible uro gyn appt and possible palliative care for pain  Fu on pain and wound status  Fu on any symptoms of uti and is pt taking the vit c and menthamine or what about mannose d  Fu on orders for  received by home health        Patient Summary     Involvement of Care:  Do I have permission to speak with other family members about your care?       Patient Reported Labs & Vitals:  1.  Any Patient Reported Labs & Vitals?     2.  Patient Reported Blood Pressure:     3.  Patient Reported Pulse:     4.  Patient Reported Weight (Kg):     5.  Patient Reported Blood Glucose (mg/dl):       Medical and social history was reviewed with patient and/or caregiver.     Clinical Assessment     Reviewed and provided basic information on available community resources for mental health, transportation, wellness resources, and palliative care programs with patient and/or caregiver.     Complex Care Plan     Care  plan was discussed and completed today with input from patient and/or caregiver.    Patient Instructions     Instructions were provided via the [employer] patient resources and are available for the patient to view on the patient portal.      Todays OPCM Self-Management Care Plan was developed with the patients/caregivers input and was based on identified barriers from todays assessment.  Goals were written today with the patient/caregiver and the patient has agreed to work towards these goals to improve his/her overall well-being. Patient verbalized understanding of the care plan, goals, and all of today's instructions. Encouraged patient/caregiver to communicate with his/her physician and health care team about health conditions and the treatment plan.  Provided my contact information today and encouraged patient/caregiver to call me with any questions as needed.

## 2022-08-16 ENCOUNTER — OFFICE VISIT (OUTPATIENT)
Dept: FAMILY MEDICINE | Facility: CLINIC | Age: 72
End: 2022-08-16
Payer: MEDICARE

## 2022-08-16 VITALS
OXYGEN SATURATION: 98 % | WEIGHT: 170 LBS | TEMPERATURE: 98 F | HEART RATE: 83 BPM | HEIGHT: 66 IN | SYSTOLIC BLOOD PRESSURE: 138 MMHG | DIASTOLIC BLOOD PRESSURE: 72 MMHG | BODY MASS INDEX: 27.32 KG/M2

## 2022-08-16 DIAGNOSIS — N39.0 RECURRENT UTI: ICD-10-CM

## 2022-08-16 DIAGNOSIS — L89.311 PRESSURE INJURY OF RIGHT BUTTOCK, STAGE 1: Primary | ICD-10-CM

## 2022-08-16 PROCEDURE — 99214 PR OFFICE/OUTPT VISIT, EST, LEVL IV, 30-39 MIN: ICD-10-PCS | Mod: S$GLB,,, | Performed by: STUDENT IN AN ORGANIZED HEALTH CARE EDUCATION/TRAINING PROGRAM

## 2022-08-16 PROCEDURE — 3288F FALL RISK ASSESSMENT DOCD: CPT | Mod: CPTII,S$GLB,, | Performed by: STUDENT IN AN ORGANIZED HEALTH CARE EDUCATION/TRAINING PROGRAM

## 2022-08-16 PROCEDURE — 1125F PR PAIN SEVERITY QUANTIFIED, PAIN PRESENT: ICD-10-PCS | Mod: CPTII,S$GLB,, | Performed by: STUDENT IN AN ORGANIZED HEALTH CARE EDUCATION/TRAINING PROGRAM

## 2022-08-16 PROCEDURE — 3075F PR MOST RECENT SYSTOLIC BLOOD PRESS GE 130-139MM HG: ICD-10-PCS | Mod: CPTII,S$GLB,, | Performed by: STUDENT IN AN ORGANIZED HEALTH CARE EDUCATION/TRAINING PROGRAM

## 2022-08-16 PROCEDURE — 99999 PR PBB SHADOW E&M-EST. PATIENT-LVL V: ICD-10-PCS | Mod: PBBFAC,,, | Performed by: STUDENT IN AN ORGANIZED HEALTH CARE EDUCATION/TRAINING PROGRAM

## 2022-08-16 PROCEDURE — 1125F AMNT PAIN NOTED PAIN PRSNT: CPT | Mod: CPTII,S$GLB,, | Performed by: STUDENT IN AN ORGANIZED HEALTH CARE EDUCATION/TRAINING PROGRAM

## 2022-08-16 PROCEDURE — 1100F PTFALLS ASSESS-DOCD GE2>/YR: CPT | Mod: CPTII,S$GLB,, | Performed by: STUDENT IN AN ORGANIZED HEALTH CARE EDUCATION/TRAINING PROGRAM

## 2022-08-16 PROCEDURE — 3078F DIAST BP <80 MM HG: CPT | Mod: CPTII,S$GLB,, | Performed by: STUDENT IN AN ORGANIZED HEALTH CARE EDUCATION/TRAINING PROGRAM

## 2022-08-16 PROCEDURE — 4010F PR ACE/ARB THEARPY RXD/TAKEN: ICD-10-PCS | Mod: CPTII,S$GLB,, | Performed by: STUDENT IN AN ORGANIZED HEALTH CARE EDUCATION/TRAINING PROGRAM

## 2022-08-16 PROCEDURE — 3044F PR MOST RECENT HEMOGLOBIN A1C LEVEL <7.0%: ICD-10-PCS | Mod: CPTII,S$GLB,, | Performed by: STUDENT IN AN ORGANIZED HEALTH CARE EDUCATION/TRAINING PROGRAM

## 2022-08-16 PROCEDURE — 99999 PR PBB SHADOW E&M-EST. PATIENT-LVL V: CPT | Mod: PBBFAC,,, | Performed by: STUDENT IN AN ORGANIZED HEALTH CARE EDUCATION/TRAINING PROGRAM

## 2022-08-16 PROCEDURE — 1100F PR PT FALLS ASSESS DOC 2+ FALLS/FALL W/INJURY/YR: ICD-10-PCS | Mod: CPTII,S$GLB,, | Performed by: STUDENT IN AN ORGANIZED HEALTH CARE EDUCATION/TRAINING PROGRAM

## 2022-08-16 PROCEDURE — 1160F RVW MEDS BY RX/DR IN RCRD: CPT | Mod: CPTII,S$GLB,, | Performed by: STUDENT IN AN ORGANIZED HEALTH CARE EDUCATION/TRAINING PROGRAM

## 2022-08-16 PROCEDURE — 3288F PR FALLS RISK ASSESSMENT DOCUMENTED: ICD-10-PCS | Mod: CPTII,S$GLB,, | Performed by: STUDENT IN AN ORGANIZED HEALTH CARE EDUCATION/TRAINING PROGRAM

## 2022-08-16 PROCEDURE — 99214 OFFICE O/P EST MOD 30 MIN: CPT | Mod: S$GLB,,, | Performed by: STUDENT IN AN ORGANIZED HEALTH CARE EDUCATION/TRAINING PROGRAM

## 2022-08-16 PROCEDURE — 3008F PR BODY MASS INDEX (BMI) DOCUMENTED: ICD-10-PCS | Mod: CPTII,S$GLB,, | Performed by: STUDENT IN AN ORGANIZED HEALTH CARE EDUCATION/TRAINING PROGRAM

## 2022-08-16 PROCEDURE — 3078F PR MOST RECENT DIASTOLIC BLOOD PRESSURE < 80 MM HG: ICD-10-PCS | Mod: CPTII,S$GLB,, | Performed by: STUDENT IN AN ORGANIZED HEALTH CARE EDUCATION/TRAINING PROGRAM

## 2022-08-16 PROCEDURE — 3075F SYST BP GE 130 - 139MM HG: CPT | Mod: CPTII,S$GLB,, | Performed by: STUDENT IN AN ORGANIZED HEALTH CARE EDUCATION/TRAINING PROGRAM

## 2022-08-16 PROCEDURE — 1159F MED LIST DOCD IN RCRD: CPT | Mod: CPTII,S$GLB,, | Performed by: STUDENT IN AN ORGANIZED HEALTH CARE EDUCATION/TRAINING PROGRAM

## 2022-08-16 PROCEDURE — 1111F PR DISCHARGE MEDS RECONCILED W/ CURRENT OUTPATIENT MED LIST: ICD-10-PCS | Mod: CPTII,S$GLB,, | Performed by: STUDENT IN AN ORGANIZED HEALTH CARE EDUCATION/TRAINING PROGRAM

## 2022-08-16 PROCEDURE — 1159F PR MEDICATION LIST DOCUMENTED IN MEDICAL RECORD: ICD-10-PCS | Mod: CPTII,S$GLB,, | Performed by: STUDENT IN AN ORGANIZED HEALTH CARE EDUCATION/TRAINING PROGRAM

## 2022-08-16 PROCEDURE — 3008F BODY MASS INDEX DOCD: CPT | Mod: CPTII,S$GLB,, | Performed by: STUDENT IN AN ORGANIZED HEALTH CARE EDUCATION/TRAINING PROGRAM

## 2022-08-16 PROCEDURE — 3044F HG A1C LEVEL LT 7.0%: CPT | Mod: CPTII,S$GLB,, | Performed by: STUDENT IN AN ORGANIZED HEALTH CARE EDUCATION/TRAINING PROGRAM

## 2022-08-16 PROCEDURE — 1160F PR REVIEW ALL MEDS BY PRESCRIBER/CLIN PHARMACIST DOCUMENTED: ICD-10-PCS | Mod: CPTII,S$GLB,, | Performed by: STUDENT IN AN ORGANIZED HEALTH CARE EDUCATION/TRAINING PROGRAM

## 2022-08-16 PROCEDURE — 1111F DSCHRG MED/CURRENT MED MERGE: CPT | Mod: CPTII,S$GLB,, | Performed by: STUDENT IN AN ORGANIZED HEALTH CARE EDUCATION/TRAINING PROGRAM

## 2022-08-16 PROCEDURE — 4010F ACE/ARB THERAPY RXD/TAKEN: CPT | Mod: CPTII,S$GLB,, | Performed by: STUDENT IN AN ORGANIZED HEALTH CARE EDUCATION/TRAINING PROGRAM

## 2022-08-16 RX ORDER — LEVOFLOXACIN 750 MG/1
750 TABLET ORAL DAILY
Qty: 5 TABLET | Refills: 0 | Status: SHIPPED | OUTPATIENT
Start: 2022-08-16 | End: 2022-08-21

## 2022-08-16 RX ORDER — OXYCODONE HYDROCHLORIDE 15 MG/1
15 TABLET ORAL
COMMUNITY
Start: 2022-08-04 | End: 2023-03-07

## 2022-08-17 ENCOUNTER — DOCUMENT SCAN (OUTPATIENT)
Dept: HOME HEALTH SERVICES | Facility: HOSPITAL | Age: 72
End: 2022-08-17
Payer: MEDICARE

## 2022-08-17 ENCOUNTER — TELEPHONE (OUTPATIENT)
Dept: FAMILY MEDICINE | Facility: CLINIC | Age: 72
End: 2022-08-17
Payer: MEDICARE

## 2022-08-17 NOTE — TELEPHONE ENCOUNTER
----- Message from Amy Perkins sent at 8/17/2022 11:10 AM CDT -----  Regarding: call back  Contact: [phone]  Who Called: PT     Patient is calling to talk to nurse in regards to see if her orders for home health were sent. Please advice

## 2022-08-17 NOTE — TELEPHONE ENCOUNTER
Called and spoke with pt in regards of her message. Informed pt that I just got a fax from [facility] , and will have Dr. Wei take care of the papers for him. Pt verbalized understanding of message.

## 2022-08-17 NOTE — PROGRESS NOTES
Subjective:      Patient ID: Patito Domingo is a 72 y.o. female.    Chief Complaint: Hospital Follow Up    - hospital/ER f/u   - finally back home after rehab   - sepsis in July   - now with home health; nurse concerned RE buttocks pressure ulcer; went to ER multiple times due to severe pain  - stage 1   - last time in ER also with UTI; she worries about UTIs because typically they require her to be admitted for IV abx but they did not admit her, they gave IM rocephin once and oral keflex but cultures showed resistance and she was still complaining of symptoms  - treatment deferred to me   - she otherwise is going much better since the sepsis but still having a lot of pain; right now from her wound; she does see pain management for all pain prescriptions       Review of Systems   Constitutional: Positive for activity change, appetite change and fatigue. Negative for fever.   HENT: Negative.    Respiratory: Negative for cough, shortness of breath and wheezing.    Cardiovascular: Negative for chest pain and leg swelling.   Gastrointestinal: Negative for abdominal pain, diarrhea, nausea and vomiting.   Genitourinary: Positive for dysuria and frequency. Negative for difficulty urinating.   Musculoskeletal: Positive for arthralgias, back pain, gait problem and myalgias.   Skin: Positive for wound.   Neurological: Positive for weakness. Negative for dizziness, numbness and headaches.   Psychiatric/Behavioral: Negative for dysphoric mood. The patient is not nervous/anxious.         Objective:     Vitals:    08/16/22 1121   BP: 138/72   Pulse: 83   Temp: 97.7 °F (36.5 °C)      Physical Exam  Vitals reviewed.   Constitutional:       Appearance: Normal appearance. She is normal weight.   HENT:      Head: Normocephalic and atraumatic.   Eyes:      Conjunctiva/sclera: Conjunctivae normal.   Cardiovascular:      Rate and Rhythm: Normal rate and regular rhythm.      Heart sounds: Normal heart sounds.   Pulmonary:      Effort:  Pulmonary effort is normal.      Breath sounds: Normal breath sounds.   Abdominal:      Palpations: Abdomen is soft.      Tenderness: There is no abdominal tenderness.   Musculoskeletal:         General: Normal range of motion.      Cervical back: Normal range of motion.      Right lower leg: No edema.      Left lower leg: No edema.   Skin:            Comments: Stage 1 well healing pressure ulcer marked    Neurological:      General: No focal deficit present.      Mental Status: She is alert and oriented to person, place, and time.   Psychiatric:         Mood and Affect: Mood normal.         Behavior: Behavior normal.        Assessment:         1. Pressure injury of right buttock, stage 1    2. Recurrent UTI          Plan:   1. Pressure injury of right buttock, stage 1  - SUBSEQUENT HOME HEALTH ORDERS    2. Recurrent UTI  - levoFLOXacin (LEVAQUIN) 750 MG tablet; Take 1 tablet (750 mg total) by mouth once daily. for 5 days  Dispense: 5 tablet; Refill: 0     ER follow up   Pressure wound well healing; advised to continue offloading and dressing  Subsequent  orders for wound care nurse due to difficulties getting to appointments  Start Levaquin for UTI; discussed in detail with pt that her urine is susceptible to this and she does not require IV antibiotics or hospitalization to treat as she is not toxic appearing; she voiced understanding and agreed to try   RTC PRN       [facility]iel[facility]   8/16/22

## 2022-08-19 ENCOUNTER — CARE AT HOME (OUTPATIENT)
Dept: HOME HEALTH SERVICES | Facility: CLINIC | Age: 72
End: 2022-08-19
Payer: MEDICARE

## 2022-08-19 DIAGNOSIS — L89.312 PRESSURE INJURY OF RIGHT BUTTOCK, STAGE 2: ICD-10-CM

## 2022-08-19 DIAGNOSIS — L03.317 CELLULITIS AND ABSCESS OF BUTTOCK: Primary | ICD-10-CM

## 2022-08-19 DIAGNOSIS — L02.31 CELLULITIS AND ABSCESS OF BUTTOCK: Primary | ICD-10-CM

## 2022-08-19 PROCEDURE — 1111F DSCHRG MED/CURRENT MED MERGE: CPT | Mod: CPTII,S$GLB,, | Performed by: NURSE PRACTITIONER

## 2022-08-19 PROCEDURE — 10060 I&D ABSCESS SIMPLE/SINGLE: CPT | Mod: S$GLB,,, | Performed by: NURSE PRACTITIONER

## 2022-08-19 PROCEDURE — 99350 HOME/RES VST EST HIGH MDM 60: CPT | Mod: 25,S$GLB,, | Performed by: NURSE PRACTITIONER

## 2022-08-19 PROCEDURE — 10060 PR DRAIN SKIN ABSCESS SIMPLE: ICD-10-PCS | Mod: S$GLB,,, | Performed by: NURSE PRACTITIONER

## 2022-08-19 PROCEDURE — 1111F PR DISCHARGE MEDS RECONCILED W/ CURRENT OUTPATIENT MED LIST: ICD-10-PCS | Mod: CPTII,S$GLB,, | Performed by: NURSE PRACTITIONER

## 2022-08-19 PROCEDURE — 99350 PR HOME VISIT,ESTAB PATIENT,LEVEL IV: ICD-10-PCS | Mod: 25,S$GLB,, | Performed by: NURSE PRACTITIONER

## 2022-08-20 ENCOUNTER — HOSPITAL ENCOUNTER (EMERGENCY)
Facility: HOSPITAL | Age: 72
Discharge: HOME | End: 2022-08-20
Attending: EMERGENCY MEDICINE
Payer: MEDICARE

## 2022-08-20 VITALS
OXYGEN SATURATION: 100 % | SYSTOLIC BLOOD PRESSURE: 172 MMHG | BODY MASS INDEX: 28.93 KG/M2 | WEIGHT: 180 LBS | HEIGHT: 66 IN | DIASTOLIC BLOOD PRESSURE: 72 MMHG | RESPIRATION RATE: 18 BRPM | HEART RATE: 73 BPM | TEMPERATURE: 98 F

## 2022-08-20 DIAGNOSIS — T14.90XA TRAUMA: ICD-10-CM

## 2022-08-20 DIAGNOSIS — Z51.89 VISIT FOR WOUND CHECK: Primary | ICD-10-CM

## 2022-08-20 PROCEDURE — 99283 EMERGENCY DEPT VISIT LOW MDM: CPT | Mod: 25

## 2022-08-20 PROCEDURE — 25000003 PHARM REV CODE 250: Performed by: EMERGENCY MEDICINE

## 2022-08-20 RX ORDER — OXYCODONE HYDROCHLORIDE 5 MG/1
10 TABLET ORAL
Status: COMPLETED | OUTPATIENT
Start: 2022-08-20 | End: 2022-08-20

## 2022-08-20 RX ORDER — DOXYCYCLINE HYCLATE 100 MG
100 TABLET ORAL
Status: COMPLETED | OUTPATIENT
Start: 2022-08-20 | End: 2022-08-20

## 2022-08-20 RX ORDER — DOXYCYCLINE 100 MG/1
100 CAPSULE ORAL 2 TIMES DAILY
Qty: 14 CAPSULE | Refills: 0 | Status: SHIPPED | OUTPATIENT
Start: 2022-08-20 | End: 2022-08-27

## 2022-08-20 RX ORDER — OXYCODONE AND ACETAMINOPHEN 5; 325 MG/1; MG/1
1 TABLET ORAL
Status: COMPLETED | OUTPATIENT
Start: 2022-08-20 | End: 2022-08-20

## 2022-08-20 RX ADMIN — DOXYCYCLINE HYCLATE 100 MG: 100 TABLET, COATED ORAL at 06:08

## 2022-08-20 RX ADMIN — OXYCODONE HYDROCHLORIDE 10 MG: 5 TABLET ORAL at 08:08

## 2022-08-20 RX ADMIN — OXYCODONE HYDROCHLORIDE AND ACETAMINOPHEN 1 TABLET: 5; 325 TABLET ORAL at 06:08

## 2022-08-20 NOTE — ED PROVIDER NOTES
Encounter Date: 2022       History     Chief Complaint   Patient presents with    Wound Check     Pt has I&D 1 day ago on the buttocks. Pt was told to come to ED for eval.      HPI   72f pw wound check   Reportedly had an abscess on her R buttock that was lanced yesterday  Pt also c/o R hip pain  No fevers, chills  C/o chronic abd pain    Review of patient's allergies indicates:   Allergen Reactions    Contrast media Hives, Itching and Swelling    Epinephrine Anaphylaxis     Can cause  a Carcinoid Crisis    Ibuprofen Hives, Itching and Swelling    Zofran [ondansetron hcl] Itching     And multiple other reactions    Iodinated contrast media     Morphine Other (See Comments)    Sulfa (sulfonamide antibiotics) Hives, Itching and Swelling     Past Medical History:   Diagnosis Date    Allergy     Arthritis     Cataract     Chronic diastolic (congestive) heart failure     Colon cancer     Encounter for blood transfusion     History of ESBL E. 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)      Past Surgical History:   Procedure Laterality Date    ABDOMINAL SURGERY      CATARACT EXTRACTION Left 10/2017     SECTION      CHOLECYSTECTOMY      COLON SURGERY      cystoscope      CYSTOSCOPY W/ RETROGRADES Right 10/10/2019    Procedure: CYSTOSCOPY, WITH RETROGRADE PYELOGRAM;  Surgeon: Gen Isbell MD;  Location: [facility];  Service: Urology;  Laterality: Right;    ERCP N/A 2021    Procedure: ERCP (ENDOSCOPIC RETROGRADE CHOLANGIOPANCREATOGRAPHY);  Surgeon: Jayjay Rivera MD;  Location: [address]);  Service: Endoscopy;  Laterality: N/A;    ERCP N/A 3/4/2022    Procedure: ERCP (ENDOSCOPIC RETROGRADE CHOLANGIOPANCREATOGRAPHY);   Surgeon: Roby Virgen MD;  [facility]  Service: Endoscopy;  Laterality: N/A;    EYE SURGERY      HYSTERECTOMY  5/1996    LITHOTRIPSY      LIVER BIOPSY  9/14    carcinoid    URETEROSCOPY Right 10/10/2019    Procedure: URETEROSCOPY;  Surgeon: Gen Isbell MD;  [facility]: [facility];  Service: Urology;  Laterality: Right;    UTERINE FIBROID SURGERY       Family History   Problem Relation Age of Onset    Cancer Mother         unknown    Alzheimer's disease Father     Stroke Sister     No Known Problems Son     No Known Problems Son     No Known Problems Son     No Known Problems Son     Kidney disease Neg Hx      Social History     Tobacco Use    Smoking status: Never Smoker    Smokeless tobacco: Never Used   Substance Use Topics    Alcohol use: No     Alcohol/week: 0.0 standard drinks    Drug use: No     Review of Systems   Constitutional: Negative for appetite change, chills, diaphoresis and fever.   HENT: Negative for congestion, nosebleeds, sore throat, trouble swallowing and voice change.    Eyes: Negative for photophobia, pain, redness and itching.   Respiratory: Negative for cough, chest tightness and shortness of breath.    Cardiovascular: Negative for chest pain and leg swelling.   Gastrointestinal: Positive for abdominal pain. Negative for constipation, diarrhea, nausea and vomiting.   Genitourinary: Negative for decreased urine volume, difficulty urinating, dysuria and frequency.   Musculoskeletal: Negative for gait problem.   Skin: Positive for color change and wound. Negative for rash.   Neurological: Negative for dizziness, facial asymmetry, speech difficulty and headaches.   Psychiatric/Behavioral: Negative for agitation, confusion and suicidal ideas.   All other systems reviewed and are negative.      Physical Exam     Initial Vitals [08/20/22 1628]   BP Pulse Resp Temp SpO2   (!) 184/78 69 19 97.7 °F (36.5 °C) 99 %      MAP       --         Physical  Exam    Nursing note and vitals reviewed.  Constitutional:   EXAM  General: Awake, alert and oriented. No acute distress.     Head: normocephalic and atraumatic     Eyes: Conjunctivae are clear without exudates or hemorrhage. Sclera is non-icteric. EOM are intact. Eyelids are normal in appearance without swelling or lesions.     Ears: The external ear and ear canal are non-tender and without swelling. The canal is clear without discharge. Hearing intact.     Nose: Nares are patent bilaterally.     Neck: The neck is supple. Trachea is midline. Full ROM.     Cardiac: Regular rate.     Respiratory: No signs of respiratory distress. No audible wheezes.     Abdominal: Non-distended. Generalized abd pain, chronic.     Extremities: Upper and lower extremities are atraumatic in appearance without tenderness or deformity. No swelling or erythema. Full range of motion.     : Stage II 1 cm decubitus ulcer R buttock. Small opened area without drainage lateral to the ulcer with surrounding erythema.    Skin: Appropriate color for ethnicity.     Neurological: The patient is awake, alert and oriented to person, place, and time with normal speech.     Psychiatric: Appropriate mood and affect.     In light of current/ongoing global covid-19 pandemic, all my encounters w pt were with full ppe including but not limited to gown, gloves, n95, eye protection OR from >6 ft away.           ED Course   Procedures  Labs Reviewed - No data to display       Imaging Results          X-Ray Hip 2 or 3 views Right (with Pelvis when performed) (Final result)  Result time 08/20/22 17:34:22    Final result by Romeo Carrizales MD (08/20/22 17:34:22)                 Impression:      No evidence of displaced pelvic or right hip fracture.      Electronically signed by: Romeo Carrizales  Date:    08/20/2022  Time:    17:34             Narrative:    EXAMINATION:  XR HIP WITH PELVIS WHEN PERFORMED, 2 OR 3  VIEWS RIGHT    CLINICAL HISTORY:  Injury,  unspecified, initial encounter    TECHNIQUE:  AP view of the pelvis and frog leg lateral view of the right hip were performed.    COMPARISON:  None    FINDINGS:  Pelvic ring is intact.  Femoroacetabular joints appear unremarkable.  The right hip appears intact.  Mild degenerative hip and lumbar spine changes are noted.                                 Medications   doxycycline tablet 100 mg (100 mg Oral Given 8/20/22 1810)   oxyCODONE-acetaminophen 5-325 mg per tablet 1 tablet (1 tablet Oral Given 8/20/22 1815)     Medical Decision Making:   ED Management:  XR shows no fx of her hip. Decub ulcer is now a stage II; the tiny opened area has surrounding erythema. Will treat this high risk pt with doxycycline. No systemic sxs such as fever, chills. Has home health. Nurse changed out her wound dressing to a more suitable one that will cover both areas. Strict return precautions discussed with patient expressing understanding of instructions, and all questions answered.                         Clinical Impression:   Final diagnoses:  [T14.90XA] Trauma  [Z51.89] Visit for wound check (Primary)          ED Disposition Condition    Discharge Stable        ED Prescriptions     Medication Sig Dispense Start Date End Date Auth. Provider    doxycycline (VIBRAMYCIN) 100 MG Cap Take 1 capsule (100 mg total) by mouth 2 (two) times daily. for 7 days 14 capsule 8/20/2022 8/27/2022 Rosa Swain MD        Follow-up Information     Follow up With Specialties Details Why Contact Info    Mariela Wei, DO Family Medicine   [address] [place] [id number] [phone]             Rosa Swain MD  08/20/22 6243

## 2022-08-20 NOTE — DISCHARGE INSTRUCTIONS
Finish all of the antibiotics that were prescribed to you so you don't get a worse infection. Please see your primary doctor to make sure you are improving.

## 2022-08-20 NOTE — ED NOTES
"Pt clean, noticed linear cut to area below pressure injury, rt gluteal crevice. Pt stated "home health nurse did I&d the other day, drained it and sent to er for pain." no drainage noted, small area of induration noted, cleaned and meplix applied.  "

## 2022-08-22 ENCOUNTER — OUTPATIENT CASE MANAGEMENT (OUTPATIENT)
Dept: ADMINISTRATIVE | Facility: OTHER | Age: 72
End: 2022-08-22
Payer: MEDICARE

## 2022-08-22 ENCOUNTER — TELEPHONE (OUTPATIENT)
Dept: SURGERY | Facility: CLINIC | Age: 72
End: 2022-08-22
Payer: MEDICARE

## 2022-08-22 NOTE — TELEPHONE ENCOUNTER
----- Message from Perla Papi sent at 8/22/2022  2:44 PM CDT -----  Contact: Marcie Thrasher  Type:  Needs Medical Advice    Who Called:    Symptoms (please be specific):  would like to get a call back regarding pt care      Would the patient rather a call back or a response via [facility]?  Call   Best Call Back Number:  [phone]   Additional Information:              08/22/2022              1547  Spoke to Marcie regarding the above message. Informed her that Dr. Hernandez has never seen the patient. She stated the patient was seen by a nurse practitioner for a home visit. As a result, the NP is wanting the patient's abscessed wound to be cared for in clinic. Message sent to Dr. Hernandez. Marcie will be contacted once recommendations are made. Marcie verbalized understanding.

## 2022-08-23 ENCOUNTER — DOCUMENT SCAN (OUTPATIENT)
Dept: HOME HEALTH SERVICES | Facility: HOSPITAL | Age: 72
End: 2022-08-23
Payer: MEDICARE

## 2022-08-24 ENCOUNTER — TELEPHONE (OUTPATIENT)
Dept: FAMILY MEDICINE | Facility: CLINIC | Age: 72
End: 2022-08-24
Payer: MEDICARE

## 2022-08-24 ENCOUNTER — PATIENT OUTREACH (OUTPATIENT)
Dept: ADMINISTRATIVE | Facility: OTHER | Age: 72
End: 2022-08-24
Payer: MEDICARE

## 2022-08-24 VITALS
DIASTOLIC BLOOD PRESSURE: 70 MMHG | SYSTOLIC BLOOD PRESSURE: 132 MMHG | HEART RATE: 72 BPM | TEMPERATURE: 98 F | OXYGEN SATURATION: 97 %

## 2022-08-24 DIAGNOSIS — L89.311 PRESSURE INJURY OF RIGHT BUTTOCK, STAGE 1: Primary | ICD-10-CM

## 2022-08-24 NOTE — TELEPHONE ENCOUNTER
Called and spoke with pt in regards of her message. Pt states that she is needing an order for a wound care hh services. Informed pt that she has an appt on 9/2/2022, but patient is wanting to have something sooner. Informed pt that  might be the soonest appointment that they had in 2 weeks. Patient states that she is in extreme pain and she can't wait 2 weeks for that appointment. Please advise.

## 2022-08-24 NOTE — TELEPHONE ENCOUNTER
----- Message from Amy Perkins sent at 8/24/2022 11:03 AM CDT -----  Regarding: call back  Contact: [phone]  Who Called: PT     Patient is calling to talk to nurse in regards to see if the orders for home health were approved or sent since patient has not received a call at all from anyone.

## 2022-08-24 NOTE — PROGRESS NOTES
[facility]  Medical Home Visit    Visit Date: 8/24/2022  Encounter Provider: Amy Bright  PCP:  Mariela Wei DO    Subjective:      Patient ID: Patito Domingo is a 72 y.o. female.    Consult Requested By:  No ref. provider found  Reason for Consult:  Wound to buttocks    Patito is being seen at home due to being seen at home due to physical debility that presents a taxing effort to leave the home, to mitigate high risk of hospital readmission and/or due to the limited availability of reliable or safe options for transportation to the point of access to the provider. Prior to treatment on this visit the chart was reviewed and patient verbal consent was obtained.    Chief Complaint: Wound Check      Pt is being seen today in her home for eval of possible abscess to buttocks.  She has gone to ER on multiple occasions for pain to her buttocks. She has been referred to both sound care and PCP, but pt has a history of non-compliance with appts related to transportation. She misses clinic visits and will utilize ambulance transport to ER.     She has been treated with antibiotics for her ulcer and recently had a UTI.  Home health staff reports she is crying and upset at every visit. Reports her pain level is bad.    Upon arrival pt is ambulating in her home, reports pressure and pain to her right upper buttock.  She has a pressure ulcer, stage 2 to lower right buttock, she has firm area of induration with warmth to her upper right buttock. She is currently taking antibiotics.           Review of Systems   Constitutional: Negative for activity change, fatigue and fever.   HENT: Negative.    Eyes: Negative.    Respiratory: Negative for chest tightness.    Cardiovascular: Negative.  Negative for leg swelling.   Gastrointestinal: Negative.    Endocrine: Negative.    Genitourinary: Negative.    Musculoskeletal: Positive for gait problem.   Skin: Positive for wound (buttocks).   Allergic/Immunologic: Negative.     Hematological: Negative.    Psychiatric/Behavioral: Negative.  Negative for agitation.   All other systems reviewed and are negative.        Objective:   Physical Exam  Vitals reviewed.   Constitutional:       General: She is not in acute distress.     Appearance: She is well-developed.   HENT:      Head: Normocephalic and atraumatic.   Eyes:      Pupils: Pupils are equal, round, and reactive to light.   Cardiovascular:      Rate and Rhythm: Normal rate and regular rhythm.      Heart sounds: Normal heart sounds.   Pulmonary:      Effort: Pulmonary effort is normal.      Breath sounds: Normal breath sounds.   Abdominal:      General: Bowel sounds are normal.      Palpations: Abdomen is soft.   Musculoskeletal:         General: Normal range of motion.      Cervical back: Normal range of motion and neck supple.   Skin:     General: Skin is warm and dry.      Comments: Stage 2 pressure ulcer to buttocks  Abscess with induration to upper right buttock   Neurological:      Mental Status: She is alert and oriented to person, place, and time.      Cranial Nerves: No cranial nerve deficit.   Psychiatric:         Behavior: Behavior normal.         Thought Content: Thought content normal.         Judgment: Judgment normal.         Vitals:    08/19/22 1231   BP: 132/70   Pulse: 72   Temp: 98.3 °F (36.8 °C)   SpO2: 97%   PainSc:   5     There is no height or weight on file to calculate BMI.    Assessment:     1. Cellulitis and abscess of buttock    2. Pressure injury of right buttock, stage 2        Plan:            Problem List Items Addressed This Visit    None     Visit Diagnoses     Cellulitis and abscess of buttock    -  Primary    Pressure injury of right buttock, stage 2           Complete antibiotics  I/D performed to abscess with aseptic technique.  #10 blade used to I/D abcess, large amount of pus drained.  Minimal bleeding, pressure held, wound cleaned with wound cleanser, incision packed with gauze.  Pt tolerated  well  Orders to home health to pack wound.    Discussed with pt the importance of getting to her wound care appts. Pressure ulcer has some undermining and possible tunnneling, discussed with her that care for this such has debriding cannot be done by NP in the home, she stated understanding. In [place] transport is easier for pt. Discussed possible local appt with surgeon if she cannot get to wound care    [facility] will assist with appts      Were controlled substances prescribed?  No    Follow Up Appointments:   Future Appointments   Date Time Provider Department Center   9/1/2022  9:20 AM Ervin Parikh MD [facility]OLOGY [facility]   9/2/2022 10:00 AM Jak Pratt MD [facility] W[facility]   12/20/2022 10:40 AM DO TARAH [facility]       Signature: Amy Bright NP

## 2022-08-24 NOTE — PROGRESS NOTES
Community Health Worker assistance requested from Marcie Thrasher, RN to provide support calls as needed for this patient on receiving her envelope for  Community choice waiver application.  Spoke to: Patient  Pt/caregiver reported needs: Patient has not receive envelope.   Resources/Support provided: n/a   Follow up required: yes   Next scheduled call: n/a

## 2022-08-26 PROCEDURE — G0179 MD RECERTIFICATION HHA PT: HCPCS | Mod: ,,, | Performed by: STUDENT IN AN ORGANIZED HEALTH CARE EDUCATION/TRAINING PROGRAM

## 2022-08-26 PROCEDURE — G0179 PR HOME HEALTH MD RECERTIFICATION: ICD-10-PCS | Mod: ,,, | Performed by: STUDENT IN AN ORGANIZED HEALTH CARE EDUCATION/TRAINING PROGRAM

## 2022-08-30 ENCOUNTER — DOCUMENT SCAN (OUTPATIENT)
Dept: HOME HEALTH SERVICES | Facility: HOSPITAL | Age: 72
End: 2022-08-30
Payer: MEDICAID

## 2022-09-01 ENCOUNTER — PATIENT OUTREACH (OUTPATIENT)
Dept: ADMINISTRATIVE | Facility: OTHER | Age: 72
End: 2022-09-01
Payer: MEDICAID

## 2022-09-01 ENCOUNTER — OFFICE VISIT (OUTPATIENT)
Dept: UROLOGY | Facility: CLINIC | Age: 72
End: 2022-09-01
Payer: MEDICARE

## 2022-09-01 ENCOUNTER — EXTERNAL HOME HEALTH (OUTPATIENT)
Dept: HOME HEALTH SERVICES | Facility: HOSPITAL | Age: 72
End: 2022-09-01
Payer: MEDICARE

## 2022-09-01 VITALS
HEART RATE: 71 BPM | BODY MASS INDEX: 27.37 KG/M2 | SYSTOLIC BLOOD PRESSURE: 150 MMHG | WEIGHT: 170.31 LBS | HEIGHT: 66 IN | DIASTOLIC BLOOD PRESSURE: 79 MMHG

## 2022-09-01 DIAGNOSIS — N39.0 RECURRENT UTI: ICD-10-CM

## 2022-09-01 DIAGNOSIS — N20.0 STAGHORN CALCULUS: Primary | ICD-10-CM

## 2022-09-01 PROCEDURE — 3288F FALL RISK ASSESSMENT DOCD: CPT | Mod: CPTII,S$GLB,, | Performed by: UROLOGY

## 2022-09-01 PROCEDURE — 3288F PR FALLS RISK ASSESSMENT DOCUMENTED: ICD-10-PCS | Mod: CPTII,S$GLB,, | Performed by: UROLOGY

## 2022-09-01 PROCEDURE — 99999 PR PBB SHADOW E&M-EST. PATIENT-LVL IV: ICD-10-PCS | Mod: PBBFAC,,, | Performed by: UROLOGY

## 2022-09-01 PROCEDURE — 3008F BODY MASS INDEX DOCD: CPT | Mod: CPTII,S$GLB,, | Performed by: UROLOGY

## 2022-09-01 PROCEDURE — 3078F DIAST BP <80 MM HG: CPT | Mod: CPTII,S$GLB,, | Performed by: UROLOGY

## 2022-09-01 PROCEDURE — 1101F PT FALLS ASSESS-DOCD LE1/YR: CPT | Mod: CPTII,S$GLB,, | Performed by: UROLOGY

## 2022-09-01 PROCEDURE — 99213 PR OFFICE/OUTPT VISIT, EST, LEVL III, 20-29 MIN: ICD-10-PCS | Mod: S$GLB,,, | Performed by: UROLOGY

## 2022-09-01 PROCEDURE — 4010F ACE/ARB THERAPY RXD/TAKEN: CPT | Mod: CPTII,S$GLB,, | Performed by: UROLOGY

## 2022-09-01 PROCEDURE — 1159F MED LIST DOCD IN RCRD: CPT | Mod: CPTII,S$GLB,, | Performed by: UROLOGY

## 2022-09-01 PROCEDURE — 3044F HG A1C LEVEL LT 7.0%: CPT | Mod: CPTII,S$GLB,, | Performed by: UROLOGY

## 2022-09-01 PROCEDURE — 3078F PR MOST RECENT DIASTOLIC BLOOD PRESSURE < 80 MM HG: ICD-10-PCS | Mod: CPTII,S$GLB,, | Performed by: UROLOGY

## 2022-09-01 PROCEDURE — 1160F PR REVIEW ALL MEDS BY PRESCRIBER/CLIN PHARMACIST DOCUMENTED: ICD-10-PCS | Mod: CPTII,S$GLB,, | Performed by: UROLOGY

## 2022-09-01 PROCEDURE — 1101F PR PT FALLS ASSESS DOC 0-1 FALLS W/OUT INJ PAST YR: ICD-10-PCS | Mod: CPTII,S$GLB,, | Performed by: UROLOGY

## 2022-09-01 PROCEDURE — 4010F PR ACE/ARB THEARPY RXD/TAKEN: ICD-10-PCS | Mod: CPTII,S$GLB,, | Performed by: UROLOGY

## 2022-09-01 PROCEDURE — 99999 PR PBB SHADOW E&M-EST. PATIENT-LVL IV: CPT | Mod: PBBFAC,,, | Performed by: UROLOGY

## 2022-09-01 PROCEDURE — 1159F PR MEDICATION LIST DOCUMENTED IN MEDICAL RECORD: ICD-10-PCS | Mod: CPTII,S$GLB,, | Performed by: UROLOGY

## 2022-09-01 PROCEDURE — 3077F SYST BP >= 140 MM HG: CPT | Mod: CPTII,S$GLB,, | Performed by: UROLOGY

## 2022-09-01 PROCEDURE — 3077F PR MOST RECENT SYSTOLIC BLOOD PRESSURE >= 140 MM HG: ICD-10-PCS | Mod: CPTII,S$GLB,, | Performed by: UROLOGY

## 2022-09-01 PROCEDURE — 3008F PR BODY MASS INDEX (BMI) DOCUMENTED: ICD-10-PCS | Mod: CPTII,S$GLB,, | Performed by: UROLOGY

## 2022-09-01 PROCEDURE — 1125F PR PAIN SEVERITY QUANTIFIED, PAIN PRESENT: ICD-10-PCS | Mod: CPTII,S$GLB,, | Performed by: UROLOGY

## 2022-09-01 PROCEDURE — 1125F AMNT PAIN NOTED PAIN PRSNT: CPT | Mod: CPTII,S$GLB,, | Performed by: UROLOGY

## 2022-09-01 PROCEDURE — 3044F PR MOST RECENT HEMOGLOBIN A1C LEVEL <7.0%: ICD-10-PCS | Mod: CPTII,S$GLB,, | Performed by: UROLOGY

## 2022-09-01 PROCEDURE — 1160F RVW MEDS BY RX/DR IN RCRD: CPT | Mod: CPTII,S$GLB,, | Performed by: UROLOGY

## 2022-09-01 PROCEDURE — 99213 OFFICE O/P EST LOW 20 MIN: CPT | Mod: S$GLB,,, | Performed by: UROLOGY

## 2022-09-01 NOTE — PROGRESS NOTES
Subjective:       Patient ID: Patito Domingo is a 72 y.o. female.    Chief Complaint: Follow-up (3 month follow up)     This is a 72 y.o.  female patient that is an established patient of mine.  Noted to have abd pain at the time, CT showed chronic bilateral lower pole stones and mild hydronephrosis.  Long h/o UTIs.  Seen by Dr. Marques in past and noted to have chronic lower pole stones and intervention was not recommended given would required PCNL and risks of this.  She has had stone treatment in past.     3/18/22:  Follow up [facility] showed no hydronephrosis, recent choledochalithiasis and ERCP to removed, CT before and after shoed no hydronephrosis and stable renal stones    6/1/22:  Recently admitted to the hospital with abdominal pain found to have multidrug resistant urinary tract infection, of note this was not a straight catheterized urine.  She was given antibiotics and noted improvement in her abdominal pain.  She has also noted have pneumobilia on CT scan.  She recent was emergency department 5/30 in CT showed stable findings.  I did have extensive discussion with Dr. Sánchez and his team regarding her bilateral nephrolithiasis that is chronic.  I doubt this is the cause of her continuing abdominal pain or recurrent UTI his as she has had this for now decades.    9/1/22:  here for follow up, Ucx 8/12 with low colony proteus treated, otherwise no UTIs on methenamine and Vit C.  Mild intermittent dysuria.  Main issues is had abscess drained to buttock and pain to this area    Reviewed: CT 3/2022 x 2, [facility] 2/2022, CT 5/30/22      Lab Results   Component Value Date    CREATININE 0.8 07/28/2022       ---  Past Medical History:   Diagnosis Date    Allergy     Arthritis     Cataract     Chronic diastolic (congestive) heart failure     Colon cancer     Encounter for blood transfusion     History of ESBL E. 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)        Past Surgical History:   Procedure Laterality Date    ABDOMINAL SURGERY      CATARACT EXTRACTION Left 10/2017     SECTION      CHOLECYSTECTOMY      COLON SURGERY      cystoscope      CYSTOSCOPY W/ RETROGRADES Right 10/10/2019    Procedure: CYSTOSCOPY, WITH RETROGRADE PYELOGRAM;  Surgeon: Gen Isbell MD;  Location: [facility] OR;  Service: Urology;  Laterality: Right;    ERCP N/A 2021    Procedure: ERCP (ENDOSCOPIC RETROGRADE CHOLANGIOPANCREATOGRAPHY);  Surgeon: Jayjay Rivera MD;  [facility]  Service: Endoscopy;  Laterality: N/A;    ERCP N/A 3/4/2022    Procedure: ERCP (ENDOSCOPIC RETROGRADE CHOLANGIOPANCREATOGRAPHY);  Surgeon: Roby Virgen MD;  Location: [facility] ENDO ([address] [place] [address]);  Service: Endoscopy;  Laterality: N/A;    EYE SURGERY      HYSTERECTOMY  1996    LITHOTRIPSY      LIVER BIOPSY      carcinoid    URETEROSCOPY Right 10/10/2019    Procedure: URETEROSCOPY;  Surgeon: Gen Isbell MD;  Location: [facility] OR;  Service: Urology;  Laterality: Right;    UTERINE FIBROID SURGERY         Family History   Problem Relation Age of Onset    Cancer Mother         unknown    Alzheimer's disease Father     Stroke Sister     No Known Problems Son     No Known Problems Son     No Known Problems Son     No Known Problems Son     Kidney disease Neg Hx        Social History     Tobacco Use    Smoking status: Never    Smokeless tobacco: Never   Substance Use Topics    Alcohol use: No     Alcohol/week: 0.0 standard drinks    Drug use: No       Current Outpatient Medications on File Prior to Visit   Medication Sig Dispense Refill    amLODIPine (NORVASC) 10 MG tablet Take 1 tablet (10 mg total) by mouth once daily. 30 tablet 11    ascorbic acid, vitamin C,  (VITAMIN C) 1000 MG tablet Take 1 tablet (1,000 mg total) by mouth 2 (two) times daily. 60 tablet 5    atorvastatin (LIPITOR) 40 MG tablet Take 1 tablet (40 mg total) by mouth every evening. 90 tablet 3    baclofen (LIORESAL) 10 MG tablet Take 1 tablet (10 mg total) by mouth 2 (two) times daily as needed (muscle cramps). 90 tablet 1    cyanocobalamin (VITAMIN B-12) 1000 MCG tablet Take 1 tablet (1,000 mcg total) by mouth once daily. 30 tablet 5    dicyclomine (BENTYL) 20 mg tablet TAKE 1 TABLET(20 MG) BY MOUTH THREE TIMES DAILY AS NEEDED FOR ABDOMINAL PAIN 60 tablet 1    gabapentin (NEURONTIN) 300 MG capsule Take 1 capsule (300 mg total) by mouth every evening. 30 capsule 1    magnesium oxide (MAG-OX) 400 mg (241.3 mg magnesium) tablet Take 1 tablet (400 mg total) by mouth 2 (two) times daily. 60 tablet 1    meclizine (ANTIVERT) 25 mg tablet Take 1 tablet (25 mg total) by mouth 3 (three) times daily as needed for Dizziness. 60 tablet 3    methenamine (HIPREX) 1 gram Tab Take 1 tablet (1 g total) by mouth 2 (two) times daily. 60 tablet 6    methyl salicylate-menthol 15-10% 15-10 % Crea Apply topically 3 (three) times daily. 85 g 1    metoprolol succinate (TOPROL-XL) 25 MG 24 hr tablet Take 1 tablet (25 mg total) by mouth 2 (two) times daily. 180 tablet 3    oxyCODONE (ROXICODONE) 10 mg Tab immediate release tablet Take 1 tablet (10 mg total) by mouth every 4 (four) hours as needed for Pain. 30 tablet 0    oxyCODONE (ROXICODONE) 15 MG Tab Take 15 mg by mouth.      pantoprazole (PROTONIX) 40 MG tablet Take 1 tablet (40 mg total) by mouth once daily. 90 tablet 3    polyethylene glycol (GLYCOLAX) 17 gram PwPk Take 17 g by mouth once daily.  0    senna-docusate 8.6-50 mg (PERICOLACE) 8.6-50 mg per tablet Take 1 tablet by mouth 2 (two) times daily.      miconazole NITRATE 2 % (MICOTIN) 2 % top powder Apply topically 2 (two) times daily. for 10 days  0    [DISCONTINUED] diclofenac sodium (VOLTAREN) 1 % Gel  Apply 2 g topically 4 (four) times daily as needed (area of pain). 100 g 0    [DISCONTINUED] losartan (COZAAR) 100 MG tablet TAKE 1 TABLET (100 MG TOTAL) BY MOUTH ONCE DAILY. 90 tablet 0     No current facility-administered medications on file prior to visit.       Review of patient's allergies indicates:   Allergen Reactions    Contrast media Hives, Itching and Swelling    Epinephrine Anaphylaxis     Can cause  a Carcinoid Crisis    Ibuprofen Hives, Itching and Swelling    Zofran [ondansetron hcl] Itching     And multiple other reactions    Iodinated contrast media     Morphine Other (See Comments)    Sulfa (sulfonamide antibiotics) Hives, Itching and Swelling       Review of Systems   Constitutional:  Negative for activity change, chills, fatigue and fever.   Respiratory:  Negative for cough, chest tightness and shortness of breath.    Cardiovascular:  Negative for chest pain.   Gastrointestinal:  Negative for abdominal distention, abdominal pain (chronic), nausea and vomiting.   Genitourinary:  Negative for difficulty urinating, flank pain, hematuria, pelvic pain and vaginal pain.   Musculoskeletal:  Positive for back pain. Negative for gait problem.     Objective:      Physical Exam  HENT:      Head: Normocephalic.   Cardiovascular:      Pulses: Normal pulses.   Pulmonary:      Effort: Pulmonary effort is normal.   Abdominal:      General: Abdomen is flat. There is no distension.      Palpations: Abdomen is soft.      Tenderness: There is no abdominal tenderness. There is no right CVA tenderness, left CVA tenderness or guarding.   Genitourinary:     Comments: Induration to right gluteus  Musculoskeletal:      Cervical back: Normal range of motion.   Skin:     General: Skin is warm.   Neurological:      General: No focal deficit present.      Mental Status: She is alert.       Assessment:     Problem Noted   Staghorn Calculus 10/15/2020    Chronic stable     Recurrent Uti 4/28/2018   Bilateral  Nephrolithiasis (Resolved) 9/17/2019    Bilateral lower poles that are chronic (followed by Dr. Marques and did not recommend intervention given would need PCNL and risks)  --recurrent UTI  -- abd pain       Abdominal Pain (Resolved)    Kidney Stone (Resolved) 12/1/2014    Stable chronic          Plan:     1.  Again reviwed findings of large right lower pole staghorn stone as well as left lower pole stone.  I discussed with her that I'm unsure if these were the cause of her recurrent urinary tract infections.  I doubt these were the cause of her chronic abdominal pain.  In order to clear all stones which would be required to try to alleviate recurrent urinary tract infection she would require right percutaneous nephrolithotomy as well as the left possible percutaneous nephrolithotomy versus ureteroscopy.  This would be extensive operation on the right and then require a follow-up procedure on the left.  The risks benefits alternatives of procedure were discussed.  I discussed with her that I feel that she is likely high risk for this and may take multiple procedures on both sides to have complete stone clearance.  Given the I am unsure if this is the cause of her UTIs or the source of her abdominal pain, and the high risk of the operations, I do not think intervention is in her best interest. She reports past urologist had similar conclusions on chronic stones.  Currently, she does not want to undergo intervention.   2.  Continue methenamine and vitamin C to possibly decrease rUTIs.    3.  Return to ED if persistent buttock pain or contact general surgeon  4.  Follow up in 6 months     Ervin Parikh MD

## 2022-09-01 NOTE — PROGRESS NOTES
Community Health Worker assistance requested from Marcie Thrasher,RN provide support call as needed for this patient.  Spoke to: Patient stated she still has not received her envelope but also stated she has not checked her mail and will have her son check her mailbox.   Pt/caregiver reported needs:  Resources/Support provided: informed patient of medicaid customer service number   Follow up required: yes   Next scheduled call:

## 2022-09-02 ENCOUNTER — HOSPITAL ENCOUNTER (OUTPATIENT)
Dept: WOUND CARE | Facility: HOSPITAL | Age: 72
Discharge: HOME | End: 2022-09-02
Attending: PREVENTIVE MEDICINE
Payer: MEDICARE

## 2022-09-02 DIAGNOSIS — N18.4 CHRONIC KIDNEY DISEASE, STAGE 4 (SEVERE): ICD-10-CM

## 2022-09-02 DIAGNOSIS — I10 ESSENTIAL HYPERTENSION: ICD-10-CM

## 2022-09-02 DIAGNOSIS — L89.41: Primary | ICD-10-CM

## 2022-09-02 PROCEDURE — 99202 OFFICE O/P NEW SF 15 MIN: CPT

## 2022-09-02 NOTE — PROGRESS NOTES
[facility]    Subjective:       Patient ID: Patito Domingo is a 72 y.o. female.    Chief Complaint: Wound Consult    Patient presents to wound clinic as a new patient for a right buttock abscess. States the issues started in early August and at one point developed an abscess that was I&D. She has most recently completed a course of antibiotics x2. Complains of pain in the area. Has home health that is coming out twice a week. Patient it anxious today.          Review of Systems      Objective:      Physical Exam           Assessment:       ICD-10-CM ICD-9-CM   1. Pressure injury of contiguous region involving back and right buttock, stage 1  L89.41 707.09     707.21   2. Chronic kidney disease, stage 4 (severe)  N18.4 585.4   3. Essential hypertension  I10 401.9     Resolved abscess at this time. No open wound. There is induration, but no fluctuance, erythema, or increase in warmth. The are is tender to palpation. Slightly elevated BP. Asymptomatic.        Per nuring: Pt discharged from wound care and will f/u with PCP.  Clinic nurse navigator notified, we will attempt to get her a sooner f/u with her PCP.  Pt to continue with current home health orders.    Plan:   Tissue pathology and/or culture taken:  [] Yes [x] No   Sharp debridement performed:   [] Yes [x] No   Labs ordered this visit:   [] Yes [x] No   Imaging ordered this visit:   [] Yes [x] No         Continue HH as per PCP orders. Continue to offload and protect tender area as much as possible.     Close follow up with PCP.    RTC PRN

## 2022-09-06 ENCOUNTER — OUTPATIENT CASE MANAGEMENT (OUTPATIENT)
Dept: ADMINISTRATIVE | Facility: OTHER | Age: 72
End: 2022-09-06
Payer: MEDICARE

## 2022-09-06 ENCOUNTER — PATIENT OUTREACH (OUTPATIENT)
Dept: ADMINISTRATIVE | Facility: OTHER | Age: 72
End: 2022-09-06
Payer: MEDICARE

## 2022-09-06 NOTE — PROGRESS NOTES
Outpatient Care Management  Plan of Care Follow Up Visit    Patient: Patito Domingo  MRN: [id number]  Date of Service: 09/06/2022  Completed by: Marcie Thrasher RN  Referral Date: 08/12/2022    Reason for Visit   Patient presents with    OPCM Chart Review    Follow-up    Update Plan Of Care       Brief Summary:       Next steps from previous encounter  Fu on referral to chemo for continued treatment of neuroendocrine tumor lanreotide injection  pt is aware and she will make the appt soon  Fu on possible second opinion for stone extraction or other treatment  pt declines   Fu possible uro gyn appt and possible palliative care for pain declines  Fu on pain and wound status  Fu on any symptoms of uti and is pt taking the vit c and menthamine or what about mannose d  Fu on orders for  received by home health done and they contacted pt     Interventions  Chart reviewed  Reviewed upcoming appt schedule    Assess/review short term goals met       Next steps    Fu on pain and wound status  Fu on any symptoms of uti and is pt taking the vit c and menthamine or what about mannose d  Fu on wavier application status    Patient Summary     Involvement of Care:  Do I have permission to speak with other family members about your care?       Patient Reported Labs & Vitals:  1.  Any Patient Reported Labs & Vitals?     2.  Patient Reported Blood Pressure:     3.  Patient Reported Pulse:     4.  Patient Reported Weight (Kg):     5.  Patient Reported Blood Glucose (mg/dl):       Medical and social history was reviewed with patient and/or caregiver.     Clinical Assessment     Reviewed and provided basic information on available community resources for mental health, transportation, wellness resources, and palliative care programs with patient and/or caregiver.     Complex Care Plan     Care plan was discussed and completed today with input from patient and/or caregiver.    Patient Instructions     Instructions were provided  via the [employer] patient resources and are available for the patient to view on the patient portal.      Todays OPCM Self-Management Care Plan was developed with the patients/caregivers input and was based on identified barriers from todays assessment.  Goals were written today with the patient/caregiver and the patient has agreed to work towards these goals to improve his/her overall well-being. Patient verbalized understanding of the care plan, goals, and all of today's instructions. Encouraged patient/caregiver to communicate with his/her physician and health care team about health conditions and the treatment plan.  Provided my contact information today and encouraged patient/caregiver to call me with any questions as needed.

## 2022-09-06 NOTE — PROGRESS NOTES
Community Health Worker assistance requested from Marcie Thrasher, RN to provide support calls as needed for this patient.   Spoke to: Patient   Pt/caregiver reported needs: Patient stated that she has called into Medicaid customer service for community choice waiver info and was informed that the packet has not been mail out.  Resources/Support provided: CHW will follow up with patient regarding packet.   Follow up required: yes   Next scheduled call: n/a

## 2022-09-08 ENCOUNTER — HOSPITAL ENCOUNTER (EMERGENCY)
Facility: HOSPITAL | Age: 72
Discharge: HOME | End: 2022-09-08
Attending: EMERGENCY MEDICINE
Payer: MEDICARE

## 2022-09-08 VITALS
RESPIRATION RATE: 20 BRPM | WEIGHT: 179 LBS | SYSTOLIC BLOOD PRESSURE: 186 MMHG | DIASTOLIC BLOOD PRESSURE: 89 MMHG | HEART RATE: 87 BPM | OXYGEN SATURATION: 100 % | TEMPERATURE: 99 F | BODY MASS INDEX: 28.89 KG/M2

## 2022-09-08 DIAGNOSIS — N30.00 ACUTE CYSTITIS WITHOUT HEMATURIA: ICD-10-CM

## 2022-09-08 DIAGNOSIS — M25.511 CHRONIC RIGHT SHOULDER PAIN: ICD-10-CM

## 2022-09-08 DIAGNOSIS — G89.29 CHRONIC RIGHT SHOULDER PAIN: ICD-10-CM

## 2022-09-08 DIAGNOSIS — G89.4 CHRONIC PAIN SYNDROME: Primary | ICD-10-CM

## 2022-09-08 DIAGNOSIS — L03.317 CELLULITIS OF BUTTOCK: Primary | ICD-10-CM

## 2022-09-08 LAB
ALBUMIN SERPL BCP-MCNC: 3.2 G/DL (ref 3.5–5.2)
ALP SERPL-CCNC: 97 U/L (ref 55–135)
ALT SERPL W/O P-5'-P-CCNC: 9 U/L (ref 10–44)
ANION GAP SERPL CALC-SCNC: 10 MMOL/L (ref 8–16)
AST SERPL-CCNC: 14 U/L (ref 10–40)
BACTERIA #/AREA URNS HPF: ABNORMAL /HPF
BASOPHILS # BLD AUTO: 0.02 K/UL (ref 0–0.2)
BASOPHILS NFR BLD: 0.4 % (ref 0–1.9)
BILIRUB SERPL-MCNC: 0.6 MG/DL (ref 0.1–1)
BILIRUB UR QL STRIP: NEGATIVE
BUN SERPL-MCNC: 13 MG/DL (ref 8–23)
CALCIUM SERPL-MCNC: 9.3 MG/DL (ref 8.7–10.5)
CHLORIDE SERPL-SCNC: 101 MMOL/L (ref 95–110)
CLARITY UR: CLEAR
CO2 SERPL-SCNC: 32 MMOL/L (ref 23–29)
COLOR UR: YELLOW
CREAT SERPL-MCNC: 0.9 MG/DL (ref 0.5–1.4)
DIFFERENTIAL METHOD: ABNORMAL
EOSINOPHIL # BLD AUTO: 0.1 K/UL (ref 0–0.5)
EOSINOPHIL NFR BLD: 1.4 % (ref 0–8)
ERYTHROCYTE [DISTWIDTH] IN BLOOD BY AUTOMATED COUNT: 14.7 % (ref 11.5–14.5)
EST. GFR  (NO RACE VARIABLE): >60 ML/MIN/1.73 M^2
GLUCOSE SERPL-MCNC: 114 MG/DL (ref 70–110)
GLUCOSE UR QL STRIP: NEGATIVE
HCT VFR BLD AUTO: 31.1 % (ref 37–48.5)
HGB BLD-MCNC: 9.4 G/DL (ref 12–16)
HGB UR QL STRIP: NEGATIVE
IMM GRANULOCYTES # BLD AUTO: 0.02 K/UL (ref 0–0.04)
IMM GRANULOCYTES NFR BLD AUTO: 0.4 % (ref 0–0.5)
KETONES UR QL STRIP: NEGATIVE
LEUKOCYTE ESTERASE UR QL STRIP: ABNORMAL
LIPASE SERPL-CCNC: 10 U/L (ref 4–60)
LYMPHOCYTES # BLD AUTO: 1.1 K/UL (ref 1–4.8)
LYMPHOCYTES NFR BLD: 20.6 % (ref 18–48)
MCH RBC QN AUTO: 26.1 PG (ref 27–31)
MCHC RBC AUTO-ENTMCNC: 30.2 G/DL (ref 32–36)
MCV RBC AUTO: 86 FL (ref 82–98)
MICROSCOPIC COMMENT: ABNORMAL
MONOCYTES # BLD AUTO: 0.5 K/UL (ref 0.3–1)
MONOCYTES NFR BLD: 9.2 % (ref 4–15)
NEUTROPHILS # BLD AUTO: 3.5 K/UL (ref 1.8–7.7)
NEUTROPHILS NFR BLD: 68 % (ref 38–73)
NITRITE UR QL STRIP: POSITIVE
NRBC BLD-RTO: 0 /100 WBC
PH UR STRIP: 7 [PH] (ref 5–8)
PLATELET # BLD AUTO: 262 K/UL (ref 150–450)
PMV BLD AUTO: 10.6 FL (ref 9.2–12.9)
POTASSIUM SERPL-SCNC: 3.2 MMOL/L (ref 3.5–5.1)
PROT SERPL-MCNC: 7.2 G/DL (ref 6–8.4)
PROT UR QL STRIP: NEGATIVE
RBC # BLD AUTO: 3.6 M/UL (ref 4–5.4)
RBC #/AREA URNS HPF: 1 /HPF (ref 0–4)
SODIUM SERPL-SCNC: 143 MMOL/L (ref 136–145)
SP GR UR STRIP: 1.01 (ref 1–1.03)
SQUAMOUS #/AREA URNS HPF: 0 /HPF
URN SPEC COLLECT METH UR: ABNORMAL
UROBILINOGEN UR STRIP-ACNC: NEGATIVE EU/DL
WBC # BLD AUTO: 5.1 K/UL (ref 3.9–12.7)
WBC #/AREA URNS HPF: 27 /HPF (ref 0–5)
YEAST URNS QL MICRO: ABNORMAL

## 2022-09-08 PROCEDURE — 87086 URINE CULTURE/COLONY COUNT: CPT | Performed by: EMERGENCY MEDICINE

## 2022-09-08 PROCEDURE — 87077 CULTURE AEROBIC IDENTIFY: CPT | Performed by: EMERGENCY MEDICINE

## 2022-09-08 PROCEDURE — 87186 SC STD MICRODIL/AGAR DIL: CPT | Performed by: EMERGENCY MEDICINE

## 2022-09-08 PROCEDURE — 81000 URINALYSIS NONAUTO W/SCOPE: CPT | Performed by: EMERGENCY MEDICINE

## 2022-09-08 PROCEDURE — 87088 URINE BACTERIA CULTURE: CPT | Performed by: EMERGENCY MEDICINE

## 2022-09-08 PROCEDURE — 25000003 PHARM REV CODE 250: Performed by: EMERGENCY MEDICINE

## 2022-09-08 PROCEDURE — 83690 ASSAY OF LIPASE: CPT | Performed by: EMERGENCY MEDICINE

## 2022-09-08 PROCEDURE — 80053 COMPREHEN METABOLIC PANEL: CPT | Performed by: EMERGENCY MEDICINE

## 2022-09-08 PROCEDURE — 99284 EMERGENCY DEPT VISIT MOD MDM: CPT | Mod: 25

## 2022-09-08 PROCEDURE — 85025 COMPLETE CBC W/AUTO DIFF WBC: CPT | Performed by: EMERGENCY MEDICINE

## 2022-09-08 RX ORDER — AMOXICILLIN AND CLAVULANATE POTASSIUM 875; 125 MG/1; MG/1
1 TABLET, FILM COATED ORAL 2 TIMES DAILY
Qty: 14 TABLET | Refills: 0 | Status: SHIPPED | OUTPATIENT
Start: 2022-09-08 | End: 2023-03-07

## 2022-09-08 RX ORDER — OXYCODONE HYDROCHLORIDE 5 MG/1
10 TABLET ORAL
Status: COMPLETED | OUTPATIENT
Start: 2022-09-08 | End: 2022-09-08

## 2022-09-08 RX ORDER — AMOXICILLIN AND CLAVULANATE POTASSIUM 875; 125 MG/1; MG/1
1 TABLET, FILM COATED ORAL 2 TIMES DAILY
Qty: 14 TABLET | Refills: 0 | Status: SHIPPED | OUTPATIENT
Start: 2022-09-08 | End: 2022-09-08 | Stop reason: ALTCHOICE

## 2022-09-08 RX ORDER — LIDOCAINE 50 MG/G
1 PATCH TOPICAL DAILY
Qty: 30 PATCH | Refills: 1 | Status: SHIPPED | OUTPATIENT
Start: 2022-09-08 | End: 2023-03-01 | Stop reason: SDUPTHER

## 2022-09-08 RX ORDER — AMOXICILLIN AND CLAVULANATE POTASSIUM 875; 125 MG/1; MG/1
1 TABLET, FILM COATED ORAL
Status: COMPLETED | OUTPATIENT
Start: 2022-09-08 | End: 2022-09-08

## 2022-09-08 RX ADMIN — OXYCODONE HYDROCHLORIDE 10 MG: 5 TABLET ORAL at 08:09

## 2022-09-08 RX ADMIN — AMOXICILLIN AND CLAVULANATE POTASSIUM 1 TABLET: 875; 125 TABLET, FILM COATED ORAL at 08:09

## 2022-09-08 NOTE — ED PROVIDER NOTES
"Encounter Date: 9/8/2022    SCRIBE #1 NOTE: I, Can Torres, am scribing for, and in the presence of,  Kristen Kiser MD. I have scribed the following portions of the note - Other sections scribed: HPI, ROS, Physical Exam.     History     Chief Complaint   Patient presents with    Abdominal Pain     Patient brought in by [facility] for complaints of RLQ pain that started yesterday evening. Denies N/V/D. Denies fever. Hydrocodone taken last at 2 am. Hx of stomach and colon cancer. Last chemo tx was 3 months ago. Next one should be soon. CBG per     Wound Check     Patient reports stage 1 pressure sore to sacral area. States wound was noticed on her last admit. States she normally ambulates with walker.      Patito Domingo is a 72 y.o. female who presents to the ED via EMS with abdominal pain and buttock pain. Patient is seen by Dr. Perez and reports history of carcinoid tumors. She complains of right sided abdominal pain, described as "crampy and sharp." She reports it worsens with movement. Patient also was diagnosed with a pressure ulcer to her right buttock 08/12 and complains of worsening pain to the area. She also complains of fever, chills, and 5 episodes of diarrhea since yesterday. No nausea, vomiting, or unexpected weight loss.        The history is provided by the patient. No  was used.   Review of patient's allergies indicates:   Allergen Reactions    Contrast media Hives, Itching and Swelling    Epinephrine Anaphylaxis     Can cause  a Carcinoid Crisis    Ibuprofen Hives, Itching and Swelling    Zofran [ondansetron hcl] Itching     And multiple other reactions    Iodinated contrast media     Morphine Other (See Comments)    Sulfa (sulfonamide antibiotics) Hives, Itching and Swelling     Past Medical History:   Diagnosis Date    Allergy     Arthritis     Cataract     Chronic diastolic (congestive) heart failure     Colon cancer     Encounter for blood transfusion     " History of ESBL E. 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)      Past Surgical History:   Procedure Laterality Date    ABDOMINAL SURGERY      CATARACT EXTRACTION Left 10/2017     SECTION      CHOLECYSTECTOMY      COLON SURGERY      cystoscope      CYSTOSCOPY W/ RETROGRADES Right 10/10/2019    Procedure: CYSTOSCOPY, WITH RETROGRADE PYELOGRAM;  Surgeon: Gen Isbell MD;  Location: [facility] OR;  Service: Urology;  Laterality: Right;    ERCP N/A 2021    Procedure: ERCP (ENDOSCOPIC RETROGRADE CHOLANGIOPANCREATOGRAPHY);  Surgeon: Jayjay Rivera MD;  Location: [facility] ENDO ([address] [place] [address]);  Service: Endoscopy;  Laterality: N/A;    ERCP N/A 3/4/2022    Procedure: ERCP (ENDOSCOPIC RETROGRADE CHOLANGIOPANCREATOGRAPHY);  Surgeon: Roby Virgen MD;  Location: [facility] ENDO ([address] [place] [address]);  Service: Endoscopy;  Laterality: N/A;    EYE SURGERY      HYSTERECTOMY  1996    LITHOTRIPSY      LIVER BIOPSY      carcinoid    URETEROSCOPY Right 10/10/2019    Procedure: URETEROSCOPY;  Surgeon: Gen Isbell MD;  Location: [facility] OR;  Service: Urology;  Laterality: Right;    UTERINE FIBROID SURGERY       Family History   Problem Relation Age of Onset    Cancer Mother         unknown    Alzheimer's disease Father     Stroke Sister     No Known Problems Son     No Known Problems Son     No Known Problems Son     No Known Problems Son     Kidney disease Neg Hx      Social History     Tobacco Use    Smoking status: Never    Smokeless tobacco: Never   Substance Use Topics    Alcohol use: No     Alcohol/week: 0.0 standard drinks    Drug use: No     Review of Systems   Constitutional:  Positive for chills and fever.   HENT:  Negative for sore throat.    Respiratory:  Negative for shortness of  breath.    Cardiovascular:  Negative for chest pain.   Gastrointestinal:  Positive for abdominal pain and diarrhea. Negative for nausea and vomiting.   Genitourinary:  Negative for dysuria.   Musculoskeletal:  Negative for back pain.   Skin:  Positive for wound (right buttock). Negative for rash.   Neurological:  Negative for weakness.   Hematological:  Does not bruise/bleed easily.     Physical Exam     Initial Vitals [09/08/22 0625]   BP Pulse Resp Temp SpO2   (!) 196/93 87 17 98.5 °F (36.9 °C) 97 %      MAP       --         Physical Exam    Nursing note and vitals reviewed.  Constitutional: She appears well-developed and well-nourished.   HENT:   Head: Normocephalic and atraumatic.   Mouth/Throat: Oropharynx is clear and moist.   Eyes: Conjunctivae and EOM are normal. Pupils are equal, round, and reactive to light.   Neck: Neck supple.   Normal range of motion.  Cardiovascular:  Normal rate, regular rhythm, normal heart sounds and intact distal pulses.     Exam reveals no gallop and no friction rub.       No murmur heard.  Pulmonary/Chest: Breath sounds normal.   Abdominal: Abdomen is soft. Bowel sounds are normal. There is abdominal tenderness.   Tenderness to the right mid and upper quadrant.   Musculoskeletal:         General: No tenderness or edema. Normal range of motion.      Cervical back: Normal range of motion and neck supple.     Lymphadenopathy:     She has no cervical adenopathy.   Neurological: She is alert and oriented to person, place, and time. She has normal strength and normal reflexes.   Skin: Skin is warm and dry.   Right buttocks with a healed scar. There is an area of firmness deep in the soft tissue that is tender to palpation.   Psychiatric: She has a normal mood and affect. Her behavior is normal. Judgment and thought content normal.       ED Course   Procedures  Labs Reviewed   CBC W/ AUTO DIFFERENTIAL - Abnormal; Notable for the following components:       Result Value    RBC 3.60 (*)      Hemoglobin 9.4 (*)     Hematocrit 31.1 (*)     MCH 26.1 (*)     MCHC 30.2 (*)     RDW 14.7 (*)     All other components within normal limits   COMPREHENSIVE METABOLIC PANEL - Abnormal; Notable for the following components:    Potassium 3.2 (*)     CO2 32 (*)     Glucose 114 (*)     Albumin 3.2 (*)     ALT 9 (*)     All other components within normal limits   URINALYSIS, REFLEX TO URINE CULTURE - Abnormal; Notable for the following components:    Nitrite, UA Positive (*)     Leukocytes, UA 2+ (*)     All other components within normal limits    Narrative:     Specimen Source->Urine   URINALYSIS MICROSCOPIC - Abnormal; Notable for the following components:    WBC, UA 27 (*)     Bacteria Few (*)     Yeast, UA Rare (*)     All other components within normal limits    Narrative:     Specimen Source->Urine   CULTURE, URINE   LIPASE   SARS-COV-2 RDRP GENE          Imaging Results              US Soft Tissue Buttocks (Final result)  Result time 09/08/22 07:35:24      Final result by Galdino Leon MD (09/08/22 07:35:24)                   Impression:      Sonographic findings suggesting cellulitis and subcutaneous edema involving the right buttock in the area of clinical concern.    More focal area of relative hypoechogenicity deep to the skin surface in this region is nonspecific and not clearly entirely cystic/fluid, could represent phlegmon or other nonspecific source of inflammation.  Clinical and/or imaging follow-up to resolution would be recommended to exclude the less likely possibility of neoplasm.      Electronically signed by: Galdino Leon  Date:    09/08/2022  Time:    07:35               Narrative:    EXAMINATION:  US SOFT TISSUE BUTTOCKS    CLINICAL HISTORY:  painful area on right buttock. Please rule out abscess;    TECHNIQUE:  Targeted sonographic interrogation of the right buttock and area of pain    COMPARISON:  None    FINDINGS:  Grayscale and color static and cine sonographic imaging of the right  buttock in the area of clinical concern is noted.    In this region, there is an ill-defined area of relative hypoechogenicity relative to skin and subcutaneous tissues, deep to the skin surface measuring approximately 1.3 x 0.3 x 1.2 cm.    There is surrounding skin thickening, subcutaneous edema, and increased vascularity.  Findings would be in keeping with cellulitis.                                       Medications   oxyCODONE immediate release tablet 10 mg (has no administration in time range)   amoxicillin-clavulanate 875-125mg per tablet 1 tablet (has no administration in time range)              Scribe Attestation:   Scribe #1: I performed the above scribed service and the documentation accurately describes the services I performed. I attest to the accuracy of the note.      ED Course as of 09/08/22 0839   Thu Sep 08, 2022   0813 Urine culture August 12th grew Proteus mirabilis.  Urine culture July 7 grew E coli.  Both bacteria were sensitive to Bactrim but patient is allergic to sulfa. I will start her on augmentin and we can follow the urine cultures.The buttocks pain is most c/w cellulitis without a fluid collection so no I&D is necessary at this time. [ST]   0837 The patient was informed that she does not meet criteria for admission and she needs to go home. I will give her a Rx for augmentin, she needs to get any Rx for pain meds from her pain management doctor. [ST]      ED Course User Index  [ST] Kristen Kiser MD             Clinical Impression:   Final diagnoses:  [L03.317] Cellulitis of buttock (Primary)  [N30.00] Acute cystitis without hematuria      ED Disposition Condition    Discharge Stable          ED Prescriptions       Medication Sig Dispense Start Date End Date Auth. Provider    amoxicillin-clavulanate 875-125mg (AUGMENTIN) 875-125 mg per tablet Take 1 tablet by mouth 2 (two) times daily. 14 tablet 9/8/2022 -- Kristen Kiser MD    amoxicillin-clavulanate 875-125mg (AUGMENTIN) 875-125 mg per  tablet Take 1 tablet by mouth 2 (two) times daily. 14 tablet 9/8/2022 -- Kristen Kiser MD          Follow-up Information       Follow up With Specialties Details Why Contact Info    Mariela Wei, DO Family Medicine Schedule an appointment as soon as possible for a visit in 4 days  [address] [facility] [address]  [phone]              I, Kristen Kiser, personally performed the services described in this documentation. All medical record entries made by the scribe were at my direction and in my presence.  I have reviewed the chart and agree that the record reflects my personal performance and is accurate and complete. Kristen Kiser M.D. 8:40 AM09/08/2022      Kristen Kiser MD  09/08/22 0841

## 2022-09-11 LAB — BACTERIA UR CULT: ABNORMAL

## 2022-09-14 ENCOUNTER — TELEPHONE (OUTPATIENT)
Dept: FAMILY MEDICINE | Facility: CLINIC | Age: 72
End: 2022-09-14
Payer: MEDICARE

## 2022-09-14 NOTE — TELEPHONE ENCOUNTER
Called and spoke with pt in regards of her appointment for today. Pt informed me that she does not have a way today, but that she will follow up with Urology for her urine issues instead.

## 2022-09-15 ENCOUNTER — PATIENT OUTREACH (OUTPATIENT)
Dept: ADMINISTRATIVE | Facility: OTHER | Age: 72
End: 2022-09-15
Payer: MEDICARE

## 2022-09-15 NOTE — PROGRESS NOTES
Community Health Worker assistance requested from Marcie Thrasher, RN to provide support calls as needed for this patient.  Spoke to: Patient   Pt/caregiver reported needs: Patient still have not received packet. She is going to check her mail box today and call when she has received it.   Resources/Support provided: CHW informed patient to call again regarding the status of her mail out.  Follow up required:  Next scheduled call:   
Statement Selected

## 2022-09-18 ENCOUNTER — DOCUMENT SCAN (OUTPATIENT)
Dept: HOME HEALTH SERVICES | Facility: HOSPITAL | Age: 72
End: 2022-09-18
Payer: MEDICARE

## 2022-09-19 ENCOUNTER — TELEPHONE (OUTPATIENT)
Dept: FAMILY MEDICINE | Facility: CLINIC | Age: 72
End: 2022-09-19
Payer: MEDICARE

## 2022-09-19 ENCOUNTER — OUTPATIENT CASE MANAGEMENT (OUTPATIENT)
Dept: ADMINISTRATIVE | Facility: OTHER | Age: 72
End: 2022-09-19
Payer: MEDICARE

## 2022-09-19 NOTE — TELEPHONE ENCOUNTER
Noted; thanks for the update; will continue to monitor to make sure she is able to get this abscess resolved

## 2022-09-19 NOTE — TELEPHONE ENCOUNTER
----- Message from Marcie Thrasher RN sent at 9/19/2022 10:43 AM CDT -----  FYI  Ms pritchard is very frustrated with her treatment.  She is in a lot of pain with the abscess on her buttock.  Pt met with dr. Qureshi u surgeon and they attempted I and d in office and she told pt she wound need surgery as she was not able to get deep enough into tissue in her office to relieve the abscess.  I cannot see notes from dr. Qureshi as they are not scanned in yet.  I have called and communicated with angela the nurse in [place] office and she states she will fu with dr. Qureshi on future surgery plans.    I do realize this pt is very vocal about her pain and has had multiple areas she has complained about.  My concern is that the focus tends to be on pain seeking and not what is actually causing the pain.  Originally pt was sent to wound care in [place] and the original doc there only treated the pressure area and indicated he could not treat the abscess.  I had to contact the team at [place] and the nurse navigator made her the appointment with dr. Carlos.  I am not sure why that was not set up in the beginning at that office and  I feel like it has been a challenge to get her treatment for the abscess  From what I can see in epic and the reports from the home health team.  The pt does have a lump deep in the buttock.  Attempts to do I and d were not successful.  Seems she is scheduled for a fu visit with dr. Galo this week.      I just wanted to let you know what has been going on as I am aware she is a challenging pt but I also feel she has specific problems that are not being effectively treated.    Sincerely,  Marcie Thrasher RN,[facility]  Outpatient Complex Case Management  [phone]

## 2022-09-19 NOTE — PROGRESS NOTES
Outpatient Care Management  Plan of Care Follow Up Visit    Patient: Patito Domingo  MRN: [id number]  Date of Service: 09/19/2022  Completed by: Marcie Thrasher RN  Referral Date: 08/12/2022    Reason for Visit   Patient presents with    OPCM Chart Review    Follow-up    Update Plan Of Care       Brief Summary:   Pt states she saw dr. Carlos on wed 9/14 and she performed an I n d but told pt she was not able to get as deep as needed and might have to do surgery on it.  She is scheduled to see dr. Jiang again on Wednesday.  She states home health has been changing and repacking the wound every other day  Call to [facility] health nurse and discussed pt case.  She is scheduled to see pt today.  She has not seen pt last week so not sure how it looks but is aware of the orders for wound care to include cleaning, packing and redressing.    Next steps from previous encounter  Fu on pain and wound status  Fu on any symptoms of uti and is pt taking the vit c and menthamine or what about mannose d  Fu on wavier application status  Fu on wound care appt    Interventions  Chart reviewed  Reviewed upcoming appt schedule    Assess/review short term goals met       Next steps  Fu on c and s of blood from wound dr. Shaw [phone]  Jaya e conf with home health nurse on wound status  Fu on pain and wound status  Fu on any symptoms of uti and is pt taking the vit c and menthamine or what about mannose d  Fu on wavier application status  Fu on wound care appt      Patient Summary     Involvement of Care:  Do I have permission to speak with other family members about your care?       Patient Reported Labs & Vitals:  1.  Any Patient Reported Labs & Vitals?     2.  Patient Reported Blood Pressure:     3.  Patient Reported Pulse:     4.  Patient Reported Weight (Kg):     5.  Patient Reported Blood Glucose (mg/dl):       Medical and social history was reviewed with patient and/or caregiver.     Clinical Assessment     Reviewed  and provided basic information on available community resources for mental health, transportation, wellness resources, and palliative care programs with patient and/or caregiver.     Complex Care Plan     Care plan was discussed and completed today with input from patient and/or caregiver.    Patient Instructions     Instructions were provided via the [employer] patient resources and are available for the patient to view on the patient portal.        Todays OPCM Self-Management Care Plan was developed with the patients/caregivers input and was based on identified barriers from todays assessment.  Goals were written today with the patient/caregiver and the patient has agreed to work towards these goals to improve his/her overall well-being. Patient verbalized understanding of the care plan, goals, and all of today's instructions. Encouraged patient/caregiver to communicate with his/her physician and health care team about health conditions and the treatment plan.  Provided my contact information today and encouraged patient/caregiver to call me with any questions as needed.

## 2022-09-21 ENCOUNTER — PATIENT OUTREACH (OUTPATIENT)
Dept: ADMINISTRATIVE | Facility: OTHER | Age: 72
End: 2022-09-21
Payer: MEDICARE

## 2022-09-21 NOTE — PROGRESS NOTES
Community Health Worker assistance requested from Marcie Thrasher, RN to provide support calls as needed for this patient.  Spoke to: Patient stated she still has not received her wavier packet.   Pt/caregiver reported needs:n/a   Resources/Support provided: CHW will contact Lakia QUINTANA regarding information on waiver packet.   Follow up required: yes  Next scheduled call: n/a

## 2022-09-22 DIAGNOSIS — M79.89 SOFT TISSUE MASS: Primary | ICD-10-CM

## 2022-09-27 ENCOUNTER — OUTPATIENT CASE MANAGEMENT (OUTPATIENT)
Dept: ADMINISTRATIVE | Facility: OTHER | Age: 72
End: 2022-09-27
Payer: MEDICARE

## 2022-09-27 NOTE — PROGRESS NOTES
"Outpatient Care Management  Plan of Care Follow Up Visit    Patient: Patito Domingo  MRN: [id number]  Date of Service: 09/27/2022  Completed by: Marcie Thrasher RN  Referral Date: 08/12/2022    Reason for Visit   Patient presents with    OPCM Chart Review    Follow-up    Update Plan Of Care       Brief Summary:     Per dr. Dong, h Inf Disease  suspect her non obstructing renal stones may be colonized and predisposing her to recurrent infections and would consider urology evaluation for management of her stones  Pt has had 3 treated uti the last was in august which resulted in sepsis.  Pt was treated with iv antibiotics in [place] but developed a complicated wound on buttock and is currently with untreated uti holding off on treatment because she does not want to delay care of buttock wound.    6/9/22. She should: "continue monthly lanreotide and Follow-up... in     Noted reports from [facility] appt on 9/12 and 9/21   Patient continues to c/o a painful R buttock mass that is not improving. Aerobic, anaerobic and incisional biopsy results were all negative. Prior US indicated cellulitis but there is no infection or erythema on my exam. Patient requests additional workup and MRI is recommended. She will f/u with Dr. Mcdonough as previously planned re: metastatic carcinoid. She will f/u with urology re: recurrent UTI. All of her questions were answered and she will f/u with me to discuss the MRI findings.    Next steps from previous encounter  Fu on c and s of blood from wound dr. Shaw [phone]  Jaya e conf with home health nurse on wound status  pricilla Ochoa  and wound care nurse navigator angela [phone] opt 1   Fu on pain and wound status  Fu on any symptoms of uti and is pt taking the vit c and methenamine or what about mannose d  Fu on wavier application status  Fu on wound care appt    Interventions  Chart reviewed  Reviewed upcoming appt schedule    Assess/review short term goals met       Next " step  Fu on messages to pcp, urology, infectious disease etc  Fu on xrays of pelvis, pt needs fu with dr. Jiang as well  pricilla Ochoa  and wound care nurse navigator angela [phone] opt 1   Fu with appt for continued treatment for oncology and restart of lanreotide     Patient Summary     Involvement of Care:  Do I have permission to speak with other family members about your care?       Patient Reported Labs & Vitals:  1.  Any Patient Reported Labs & Vitals?     2.  Patient Reported Blood Pressure:     3.  Patient Reported Pulse:     4.  Patient Reported Weight (Kg):     5.  Patient Reported Blood Glucose (mg/dl):       Medical and social history was reviewed with patient and/or caregiver.     Clinical Assessment     Reviewed and provided basic information on available community resources for mental health, transportation, wellness resources, and palliative care programs with patient and/or caregiver.     Complex Care Plan     Care plan was discussed and completed today with input from patient and/or caregiver.    Patient Instructions     Instructions were provided via the [employer] patient [employer] and are available for the patient to view on the patient portal.        Todays OPCM Self-Management Care Plan was developed with the patients/caregivers input and was based on identified barriers from todays assessment.  Goals were written today with the patient/caregiver and the patient has agreed to work towards these goals to improve his/her overall well-being. Patient verbalized understanding of the care plan, goals, and all of today's instructions. Encouraged patient/caregiver to communicate with his/her physician and health care team about health conditions and the treatment plan.  Provided my contact information today and encouraged patient/caregiver to call me with any questions as needed.

## 2022-09-29 ENCOUNTER — TELEPHONE (OUTPATIENT)
Dept: FAMILY MEDICINE | Facility: CLINIC | Age: 72
End: 2022-09-29
Payer: MEDICARE

## 2022-09-29 NOTE — TELEPHONE ENCOUNTER
----- Message from Rosalba Santos LPN sent at 9/29/2022 11:12 AM CDT -----  Will set up follow up appointment as patient was recently in the hospital.  Mailing out to patient.  Thanks   ----- Message -----  From: Marcie Thrasher RN  Sent: 9/29/2022  10:12 AM CDT  To: Amy Bright NP, #      Ms. Domingo did not follow up with urology.  I have encouraged her to do so as well and she declined and indicated she is only worried about the wound on her buttock and if she has to go to hospital to get iv antibiotics it will delay treatment of the buttock wound which is causing her discomfort.  I have reached out to infectious disease H Letitia  that treated her in [place] in hopes that she might have suggestions for care and treatment of the recurrent uti.  In her notes in [place] she indicated that there is a possibility that the stones are colonized and this is cause of recurrent UTI.  I am concerned that she could develop sepsis again.  Pt states she is compliant with the vit c and methenamine that was prescribed by dr. Parikh.     I dont know what other resources could assist in managing the complexity of this patient's care.    Please advise    Marcie Thrasher RN,[facility]  Outpatient Complex Case Management  [phone]

## 2022-09-29 NOTE — TELEPHONE ENCOUNTER
Please advise message. Spoke with Marcie Thrasher RN; in regards of patient she informed me that she is just sharing this information as an FYI.

## 2022-09-29 NOTE — TELEPHONE ENCOUNTER
----- Message from Marcie Thrasher RN sent at 9/29/2022  8:58 AM CDT -----    Ms. Domingo did not follow up with urology.  I have encouraged her to do so as well and she declined and indicated she is only worried about the wound on her buttock and if she has to go to hospital to get iv antibiotics it will delay treatment of the buttock wound which is causing her discomfort.  I have reached out to infectious disease H Letitia  that treated her in [place] in hopes that she might have suggestions for care and treatment of the recurrent uti.  In her notes in [place] she indicated that there is a possibility that the stones are colonized and this is cause of recurrent UTI.  I am concerned that she could develop sepsis again.  Pt states she is compliant with the vit c and methenamine that was prescribed by dr. Parikh.     I dont know what other resources could assist in managing the complexity of this patient's care.    Please advise    Marcie Thrasher RN,[facility]  Outpatient Complex Case Management  [phone]

## 2022-09-30 ENCOUNTER — PATIENT OUTREACH (OUTPATIENT)
Dept: ADMINISTRATIVE | Facility: OTHER | Age: 72
End: 2022-09-30
Payer: MEDICARE

## 2022-09-30 NOTE — PROGRESS NOTES
CHW - Follow Up    This Community Health Worker completed a follow up visit with patient via telephone today.  Pt/Caregiver reported: Pt informed she applied for waiver services but has not received anything regarding her waiver application.  Community Health Worker provided: CHW and patient did a three way to medicaid regarding aleida and was informed that she has not seen where  has applied. Rep transferred call over to the waiver side, waited on care for 40 min with no response. Patient left her information for a return call to complete application.   Follow up required: yes  No future outreach task assigned

## 2022-10-03 ENCOUNTER — OUTPATIENT CASE MANAGEMENT (OUTPATIENT)
Dept: ADMINISTRATIVE | Facility: OTHER | Age: 72
End: 2022-10-03
Payer: MEDICARE

## 2022-10-03 ENCOUNTER — TELEPHONE (OUTPATIENT)
Dept: FAMILY MEDICINE | Facility: CLINIC | Age: 72
End: 2022-10-03
Payer: MEDICARE

## 2022-10-03 ENCOUNTER — TELEPHONE (OUTPATIENT)
Dept: UROLOGY | Facility: CLINIC | Age: 72
End: 2022-10-03
Payer: MEDICARE

## 2022-10-03 NOTE — PROGRESS NOTES
Outpatient Care Management  Plan of Care Follow Up Visit    Patient: Patito Domingo  MRN: [id number]  Date of Service: 10/03/2022  Completed by: Marcie Thrasher RN  Referral Date: 08/12/2022    Reason for Visit   Patient presents with    OPCM Chart Review    Follow-up    Update Plan Of Care       Brief Summary:   Spoke with [facility] [place] [facility] nurse.   [phone]   Hien Dong PA-C infectious disease.  Spoke with Judith Baeza LPN in [place] office [phone]    Next steps from previous encounter  Fu on messages to pcp, urology, infectious disease etc  Fu on xrays of pelvis, pt needs fu with dr. Jiang as well  pric[facility]  and wound care nurse navigator angela [phone]   Fu with appt for continued treatment for oncology and restart of lanreotide     Interventions  Chart reviewed  Reviewed upcoming appt schedule    Assess/review short term goals met       Next steps  Review educational information on      Patient Summary     Involvement of Care:  Do I have permission to speak with other family members about your care?       Patient Reported Labs & Vitals:  1.  Any Patient Reported Labs & Vitals?     2.  Patient Reported Blood Pressure:     3.  Patient Reported Pulse:     4.  Patient Reported Weight (Kg):     5.  Patient Reported Blood Glucose (mg/dl):       Medical and social history was reviewed with patient and/or caregiver.     Clinical Assessment     Reviewed and provided basic information on available community resources for mental health, transportation, wellness resources, and palliative care programs with patient and/or caregiver.     Complex Care Plan     Care plan was discussed and completed today with input from patient and/or caregiver.    Patient Instructions     Instructions were provided via the [facility] patient resources and are available for the patient to view on the patient portal.        Todays OPCM Self-Management Care Plan was developed with the patients/caregivers input  and was based on identified barriers from todays assessment.  Goals were written today with the patient/caregiver and the patient has agreed to work towards these goals to improve his/her overall well-being. Patient verbalized understanding of the care plan, goals, and all of today's instructions. Encouraged patient/caregiver to communicate with his/her physician and health care team about health conditions and the treatment plan.  Provided my contact information today and encouraged patient/caregiver to call me with any questions as needed.

## 2022-10-03 NOTE — TELEPHONE ENCOUNTER
----- Message from Corbin Preston sent at 10/3/2022  3:08 PM CDT -----  Contact: RAMON  Type: Requesting to speak with nurse        Who Called: RAMON (Case Management)   Regarding: would like a call back in the morning (tomorrow) to discuss possible referral to infectious disease for on going chronic UTI management  Would the patient rather a call back or a response via [facility]? Call back  Best Call Back Number: [phone]   Additional Information:

## 2022-10-03 NOTE — TELEPHONE ENCOUNTER
----- Message from Ran Ma sent at 10/3/2022 12:58 PM CDT -----  Type:  Needs Medical Advice    Who Called: self  Reason:needs UA orders sent to [facility]  Would the patient rather a call back or a response via [facility]? call  Best Call Back Number:[phone]  Additional Information: none

## 2022-10-04 NOTE — TELEPHONE ENCOUNTER
Spoke with the patient to schedule her next appointment. Patient voiced her understanding with a date/time on 10/06 at 9:30 am.

## 2022-10-06 ENCOUNTER — PATIENT OUTREACH (OUTPATIENT)
Dept: ADMINISTRATIVE | Facility: OTHER | Age: 72
End: 2022-10-06
Payer: MEDICARE

## 2022-10-06 ENCOUNTER — OFFICE VISIT (OUTPATIENT)
Dept: UROLOGY | Facility: CLINIC | Age: 72
End: 2022-10-06
Payer: MEDICARE

## 2022-10-06 VITALS
SYSTOLIC BLOOD PRESSURE: 197 MMHG | WEIGHT: 179 LBS | HEIGHT: 66 IN | HEART RATE: 73 BPM | BODY MASS INDEX: 28.77 KG/M2 | DIASTOLIC BLOOD PRESSURE: 94 MMHG

## 2022-10-06 DIAGNOSIS — N39.0 RECURRENT UTI: ICD-10-CM

## 2022-10-06 DIAGNOSIS — N20.0 STAGHORN CALCULUS: Primary | ICD-10-CM

## 2022-10-06 DIAGNOSIS — B37.9 YEAST INFECTION: ICD-10-CM

## 2022-10-06 PROCEDURE — 1160F PR REVIEW ALL MEDS BY PRESCRIBER/CLIN PHARMACIST DOCUMENTED: ICD-10-PCS | Mod: CPTII,S$GLB,, | Performed by: UROLOGY

## 2022-10-06 PROCEDURE — 3288F FALL RISK ASSESSMENT DOCD: CPT | Mod: CPTII,S$GLB,, | Performed by: UROLOGY

## 2022-10-06 PROCEDURE — 3288F PR FALLS RISK ASSESSMENT DOCUMENTED: ICD-10-PCS | Mod: CPTII,S$GLB,, | Performed by: UROLOGY

## 2022-10-06 PROCEDURE — 4010F PR ACE/ARB THEARPY RXD/TAKEN: ICD-10-PCS | Mod: CPTII,S$GLB,, | Performed by: UROLOGY

## 2022-10-06 PROCEDURE — 1159F MED LIST DOCD IN RCRD: CPT | Mod: CPTII,S$GLB,, | Performed by: UROLOGY

## 2022-10-06 PROCEDURE — 1101F PR PT FALLS ASSESS DOC 0-1 FALLS W/OUT INJ PAST YR: ICD-10-PCS | Mod: CPTII,S$GLB,, | Performed by: UROLOGY

## 2022-10-06 PROCEDURE — 1125F PR PAIN SEVERITY QUANTIFIED, PAIN PRESENT: ICD-10-PCS | Mod: CPTII,S$GLB,, | Performed by: UROLOGY

## 2022-10-06 PROCEDURE — 3044F HG A1C LEVEL LT 7.0%: CPT | Mod: CPTII,S$GLB,, | Performed by: UROLOGY

## 2022-10-06 PROCEDURE — 3080F DIAST BP >= 90 MM HG: CPT | Mod: CPTII,S$GLB,, | Performed by: UROLOGY

## 2022-10-06 PROCEDURE — 99214 PR OFFICE/OUTPT VISIT, EST, LEVL IV, 30-39 MIN: ICD-10-PCS | Mod: S$GLB,,, | Performed by: UROLOGY

## 2022-10-06 PROCEDURE — 4010F ACE/ARB THERAPY RXD/TAKEN: CPT | Mod: CPTII,S$GLB,, | Performed by: UROLOGY

## 2022-10-06 PROCEDURE — 1160F RVW MEDS BY RX/DR IN RCRD: CPT | Mod: CPTII,S$GLB,, | Performed by: UROLOGY

## 2022-10-06 PROCEDURE — 3080F PR MOST RECENT DIASTOLIC BLOOD PRESSURE >= 90 MM HG: ICD-10-PCS | Mod: CPTII,S$GLB,, | Performed by: UROLOGY

## 2022-10-06 PROCEDURE — 99999 PR PBB SHADOW E&M-EST. PATIENT-LVL IV: CPT | Mod: PBBFAC,,, | Performed by: UROLOGY

## 2022-10-06 PROCEDURE — 3077F PR MOST RECENT SYSTOLIC BLOOD PRESSURE >= 140 MM HG: ICD-10-PCS | Mod: CPTII,S$GLB,, | Performed by: UROLOGY

## 2022-10-06 PROCEDURE — 87186 SC STD MICRODIL/AGAR DIL: CPT | Performed by: UROLOGY

## 2022-10-06 PROCEDURE — 3044F PR MOST RECENT HEMOGLOBIN A1C LEVEL <7.0%: ICD-10-PCS | Mod: CPTII,S$GLB,, | Performed by: UROLOGY

## 2022-10-06 PROCEDURE — 87086 URINE CULTURE/COLONY COUNT: CPT | Performed by: UROLOGY

## 2022-10-06 PROCEDURE — 87077 CULTURE AEROBIC IDENTIFY: CPT | Performed by: UROLOGY

## 2022-10-06 PROCEDURE — 1101F PT FALLS ASSESS-DOCD LE1/YR: CPT | Mod: CPTII,S$GLB,, | Performed by: UROLOGY

## 2022-10-06 PROCEDURE — 3077F SYST BP >= 140 MM HG: CPT | Mod: CPTII,S$GLB,, | Performed by: UROLOGY

## 2022-10-06 PROCEDURE — 1159F PR MEDICATION LIST DOCUMENTED IN MEDICAL RECORD: ICD-10-PCS | Mod: CPTII,S$GLB,, | Performed by: UROLOGY

## 2022-10-06 PROCEDURE — 3008F PR BODY MASS INDEX (BMI) DOCUMENTED: ICD-10-PCS | Mod: CPTII,S$GLB,, | Performed by: UROLOGY

## 2022-10-06 PROCEDURE — 99214 OFFICE O/P EST MOD 30 MIN: CPT | Mod: S$GLB,,, | Performed by: UROLOGY

## 2022-10-06 PROCEDURE — 3008F BODY MASS INDEX DOCD: CPT | Mod: CPTII,S$GLB,, | Performed by: UROLOGY

## 2022-10-06 PROCEDURE — 1125F AMNT PAIN NOTED PAIN PRSNT: CPT | Mod: CPTII,S$GLB,, | Performed by: UROLOGY

## 2022-10-06 PROCEDURE — 99999 PR PBB SHADOW E&M-EST. PATIENT-LVL IV: ICD-10-PCS | Mod: PBBFAC,,, | Performed by: UROLOGY

## 2022-10-06 PROCEDURE — 87088 URINE BACTERIA CULTURE: CPT | Performed by: UROLOGY

## 2022-10-06 RX ORDER — NYSTATIN AND TRIAMCINOLONE ACETONIDE 100000; 1 [USP'U]/G; MG/G
CREAM TOPICAL 2 TIMES DAILY
Qty: 30 G | Refills: 0 | Status: SHIPPED | OUTPATIENT
Start: 2022-10-06 | End: 2022-10-06

## 2022-10-06 RX ORDER — FLUCONAZOLE 150 MG/1
150 TABLET ORAL DAILY
Qty: 2 TABLET | Refills: 0 | Status: SHIPPED | OUTPATIENT
Start: 2022-10-06 | End: 2022-10-06

## 2022-10-06 RX ORDER — DICLOFENAC SODIUM 10 MG/G
2 GEL TOPICAL 2 TIMES DAILY PRN
COMMUNITY
Start: 2022-06-21 | End: 2023-09-26 | Stop reason: CLARIF

## 2022-10-06 RX ORDER — FLUCONAZOLE 150 MG/1
TABLET ORAL
COMMUNITY
Start: 2022-08-15 | End: 2022-10-06

## 2022-10-06 RX ORDER — FLUCONAZOLE 150 MG/1
150 TABLET ORAL DAILY
Qty: 2 TABLET | Refills: 0 | Status: SHIPPED | OUTPATIENT
Start: 2022-10-06 | End: 2022-10-08

## 2022-10-06 RX ORDER — NYSTATIN AND TRIAMCINOLONE ACETONIDE 100000; 1 [USP'U]/G; MG/G
CREAM TOPICAL 2 TIMES DAILY
Qty: 30 G | Refills: 0 | Status: SHIPPED | OUTPATIENT
Start: 2022-10-06 | End: 2023-03-07

## 2022-10-06 NOTE — PROGRESS NOTES
Subjective:       Patient ID: Patito Domingo is a 72 y.o. female.    Chief Complaint: No chief complaint on file.     This is a 72 y.o.  female patient that is an established patient of mine.  Noted to have abd pain at the time, CT showed chronic bilateral lower pole stones and mild hydronephrosis.  Long h/o UTIs.  Seen by Dr. Marques in past and noted to have chronic lower pole stones and intervention was not recommended given would required PCNL and risks of this.  She has had stone treatment in past.     3/18/22:  Follow up [facility] showed no hydronephrosis, recent choledochalithiasis and ERCP to removed, CT before and after shoed no hydronephrosis and stable renal stones    6/1/22:  Recently admitted to the hospital with abdominal pain found to have multidrug resistant urinary tract infection, of note this was not a straight catheterized urine.  She was given antibiotics and noted improvement in her abdominal pain.  She has also noted have pneumobilia on CT scan.  She recent was emergency department 5/30 in CT showed stable findings.  I did have extensive discussion with Dr. Sánchez and his team regarding her bilateral nephrolithiasis that is chronic.  I doubt this is the cause of her continuing abdominal pain or recurrent UTI his as she has had this for now decades.    9/1/22:  here for follow up, Ucx 8/12 with low colony proteus treated, otherwise no UTIs on methenamine and Vit C.  Mild intermittent dysuria.  Main issues is had abscess drained to buttock and pain to this area    0/06/22:  Having some dysuria and frequency.  Still has not to buttock area that is being addressed has been treated with multiple antibiotics per report.    Reviewed: CT 3/2022 x 2, [facility] 2/2022, CT 5/30/22      Lab Results   Component Value Date    CREATININE 0.9 09/08/2022       ---  Past Medical History:   Diagnosis Date    Allergy     Arthritis     Cataract     Chronic diastolic (congestive) heart failure     Colon cancer      Encounter for blood transfusion     History of ESBL E. 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)        Past Surgical History:   Procedure Laterality Date    ABDOMINAL SURGERY      CATARACT EXTRACTION Left 10/2017     SECTION      CHOLECYSTECTOMY      COLON SURGERY      cystoscope      CYSTOSCOPY W/ RETROGRADES Right 10/10/2019    Procedure: CYSTOSCOPY, WITH RETROGRADE PYELOGRAM;  Surgeon: Gen Isbell MD;  Location: [facility];  Service: Urology;  Laterality: Right;    ERCP N/A 2021    Procedure: ERCP (ENDOSCOPIC RETROGRADE CHOLANGIOPANCREATOGRAPHY);  Surgeon: Jayjay Rivera MD;  Location: [facility] ([address] [place] [address]);  Service: Endoscopy;  Laterality: N/A;    ERCP N/A 3/4/2022    Procedure: ERCP (ENDOSCOPIC RETROGRADE CHOLANGIOPANCREATOGRAPHY);  Surgeon: Roby Virgen MD;  Location: [facility] ([address] [place] [address]);  Service: Endoscopy;  Laterality: N/A;    EYE SURGERY      HYSTERECTOMY  1996    LITHOTRIPSY      LIVER BIOPSY      carcinoid    URETEROSCOPY Right 10/10/2019    Procedure: URETEROSCOPY;  Surgeon: Gen Isbell MD;  [facility];  Service: Urology;  Laterality: Right;    UTERINE FIBROID SURGERY         Family History   Problem Relation Age of Onset    Cancer Mother         unknown    Alzheimer's disease Father     Stroke Sister     No Known Problems Son     No Known Problems Son     No Known Problems Son     No Known Problems Son     Kidney disease Neg Hx        Social History     Tobacco Use    Smoking status: Never    Smokeless tobacco: Never   Substance Use Topics    Alcohol use: No     Alcohol/week: 0.0 standard drinks    Drug use: No       Current Outpatient Medications on File Prior to Visit   Medication Sig Dispense Refill    amLODIPine  (NORVASC) 10 MG tablet Take 1 tablet (10 mg total) by mouth once daily. 30 tablet 11    amoxicillin-clavulanate 875-125mg (AUGMENTIN) 875-125 mg per tablet Take 1 tablet by mouth 2 (two) times daily. 14 tablet 0    ascorbic acid, vitamin C, (VITAMIN C) 1000 MG tablet Take 1 tablet (1,000 mg total) by mouth 2 (two) times daily. 60 tablet 5    atorvastatin (LIPITOR) 40 MG tablet Take 1 tablet (40 mg total) by mouth every evening. 90 tablet 3    baclofen (LIORESAL) 10 MG tablet TAKE 1 TABLET BY MOUTH 2 TIMES DAILY AS NEEDED FOR MUSCLE CRAMPS 90 tablet 3    cyanocobalamin (VITAMIN B-12) 1000 MCG tablet Take 1 tablet (1,000 mcg total) by mouth once daily. 30 tablet 5    dicyclomine (BENTYL) 20 mg tablet TAKE 1 TABLET(20 MG) BY MOUTH THREE TIMES DAILY AS NEEDED FOR ABDOMINAL PAIN 60 tablet 1    gabapentin (NEURONTIN) 300 MG capsule Take 1 capsule (300 mg total) by mouth every evening. 30 capsule 1    LIDOcaine (LIDODERM) 5 % Place 1 patch onto the skin once daily. Remove & Discard patch within 12 hours or as directed by MD 30 patch 1    magnesium oxide (MAG-OX) 400 mg (241.3 mg magnesium) tablet Take 1 tablet (400 mg total) by mouth 2 (two) times daily. 60 tablet 1    meclizine (ANTIVERT) 25 mg tablet Take 1 tablet (25 mg total) by mouth 3 (three) times daily as needed for Dizziness. 60 tablet 3    methenamine (HIPREX) 1 gram Tab Take 1 tablet (1 g total) by mouth 2 (two) times daily. 60 tablet 6    methyl salicylate-menthol 15-10% 15-10 % Crea Apply topically 3 (three) times daily. 85 g 1    metoprolol succinate (TOPROL-XL) 25 MG 24 hr tablet Take 1 tablet (25 mg total) by mouth 2 (two) times daily. 180 tablet 3    miconazole NITRATE 2 % (MICOTIN) 2 % top powder Apply topically 2 (two) times daily. for 10 days  0    oxyCODONE (ROXICODONE) 10 mg Tab immediate release tablet Take 1 tablet (10 mg total) by mouth every 4 (four) hours as needed for Pain. 30 tablet 0    oxyCODONE (ROXICODONE) 15 MG Tab Take 15 mg by mouth.       pantoprazole (PROTONIX) 40 MG tablet Take 1 tablet (40 mg total) by mouth once daily. 90 tablet 3    polyethylene glycol (GLYCOLAX) 17 gram PwPk Take 17 g by mouth once daily.  0    senna-docusate 8.6-50 mg (PERICOLACE) 8.6-50 mg per tablet Take 1 tablet by mouth 2 (two) times daily.      [DISCONTINUED] diclofenac sodium (VOLTAREN) 1 % Gel Apply 2 g topically 4 (four) times daily as needed (area of pain). 100 g 0    [DISCONTINUED] losartan (COZAAR) 100 MG tablet TAKE 1 TABLET (100 MG TOTAL) BY MOUTH ONCE DAILY. 90 tablet 0     No current facility-administered medications on file prior to visit.       Review of patient's allergies indicates:   Allergen Reactions    Contrast media Hives, Itching and Swelling    Epinephrine Anaphylaxis     Can cause  a Carcinoid Crisis    Ibuprofen Hives, Itching and Swelling    Zofran [ondansetron hcl] Itching     And multiple other reactions    Iodinated contrast media     Morphine Other (See Comments)    Sulfa (sulfonamide antibiotics) Hives, Itching and Swelling       Review of Systems   Constitutional:  Negative for activity change, chills, fatigue and fever.   Respiratory:  Negative for cough, chest tightness and shortness of breath.    Cardiovascular:  Negative for chest pain.   Gastrointestinal:  Negative for abdominal distention, abdominal pain (chronic), nausea and vomiting.   Genitourinary:  Negative for difficulty urinating, flank pain, hematuria, pelvic pain and vaginal pain.   Musculoskeletal:  Positive for back pain. Negative for gait problem.     Objective:      Physical Exam  HENT:      Head: Normocephalic.   Cardiovascular:      Pulses: Normal pulses.   Pulmonary:      Effort: Pulmonary effort is normal.   Abdominal:      General: Abdomen is flat. There is no distension.      Palpations: Abdomen is soft.      Tenderness: There is no abdominal tenderness. There is no right CVA tenderness, left CVA tenderness or guarding.   Genitourinary:     Comments: Yeast  infection on exam, tender introitus  Musculoskeletal:      Cervical back: Normal range of motion.   Skin:     General: Skin is warm.   Neurological:      General: No focal deficit present.      Mental Status: She is alert.       Assessment:     Problem Noted   Staghorn Calculus 10/15/2020    Chronic stable     Recurrent Uti 4/28/2018   Bilateral Nephrolithiasis (Resolved) 9/17/2019    Bilateral lower poles that are chronic (followed by Dr. Marques and did not recommend intervention given would need PCNL and risks)  --recurrent UTI  -- abd pain       Abdominal Pain (Resolved)    Kidney Stone (Resolved) 12/1/2014    Stable chronic          Plan:     1.  Again reviwed findings of large right lower pole staghorn stone as well as left lower pole stone.  I discussed with her that I'm unsure if these were the cause of her recurrent urinary tract infections.  I doubt these were the cause of her chronic abdominal pain.  In order to clear all stones which would be required to try to alleviate recurrent urinary tract infection she would require right percutaneous nephrolithotomy as well as the left possible percutaneous nephrolithotomy versus ureteroscopy.  This would be extensive operation on the right and then require a follow-up procedure on the left.  The risks benefits alternatives of procedure were discussed.  I discussed with her that I feel that she is likely high risk for this and may take multiple procedures on both sides to have complete stone clearance.  Given the I am unsure if this is the cause of her UTIs or the source of her abdominal pain, and the high risk of the operations, I do not think intervention is in her best interest. She reports past urologist had similar conclusions on chronic stones.  Currently, she does not want to undergo intervention.   2.  Continue methenamine and vitamin C to possibly decrease rUTIs.    3. Catheterized urine for culture today, will treat a positive  4.  Diflucan for yeast  infection and topical    Ervin Parikh MD

## 2022-10-06 NOTE — PROGRESS NOTES
Under sterile technique,  In/out female catheter used to obtain urine specimen.  Clear, yellow urine expelled from urinary bladder.  Patient tolerated well.

## 2022-10-06 NOTE — PROGRESS NOTES
Patient has not received her return call from Medicaid community waiver. Patient stated she has be gone but remember leaving her cell [employer] number  for a return call. [facility] informed patient of contact numbers for medicaid , plans agrees to call tomorrow morning.

## 2022-10-09 LAB — BACTERIA UR CULT: ABNORMAL

## 2022-10-10 ENCOUNTER — TELEPHONE (OUTPATIENT)
Dept: UROLOGY | Facility: CLINIC | Age: 72
End: 2022-10-10
Payer: MEDICARE

## 2022-10-10 NOTE — TELEPHONE ENCOUNTER
Spoke with the patient about her results. Patient says that her symptoms are still persistent and that she will go to  the ED on Friday.

## 2022-10-10 NOTE — TELEPHONE ENCOUNTER
----- Message from Ervin Parikh MD sent at 10/10/2022  6:51 AM CDT -----  Please inform Urine culture only sensitive to IV antibiotic.  If persistent dysuria, worsening abdominal pain, fever, nausea/vomiting needs to go to ED for IV antibiotic treatment.

## 2022-10-12 ENCOUNTER — TELEPHONE (OUTPATIENT)
Dept: FAMILY MEDICINE | Facility: CLINIC | Age: 72
End: 2022-10-12
Payer: MEDICARE

## 2022-10-12 ENCOUNTER — OUTPATIENT CASE MANAGEMENT (OUTPATIENT)
Dept: ADMINISTRATIVE | Facility: OTHER | Age: 72
End: 2022-10-12
Payer: MEDICARE

## 2022-10-12 NOTE — TELEPHONE ENCOUNTER
----- Message from Eileen Boateng sent at 10/12/2022 12:15 PM CDT -----  Type:  Needs Medical Advice    Who Called:  Lisa With [facility]   Symptoms (please be specific): Regarding Pt Uti and would like a call back   Would the patient rather a call back or a response via [facility]? call  Best Call Back Number: [phone]  Additional Information: would like a call back today if possible

## 2022-10-12 NOTE — TELEPHONE ENCOUNTER
Called and spoke with Marcie in regards of message on patient. Marcie informed me that she is calling in regards of pt and the UTI she is having. Marcie informed me that pt informed her that she will go to the hospital on Friday to get IV antibiotic, to help treat her UTI. Marcie informed me that  pt plans on going to ER on Friday to seek help for her UTI. Marcie states that once patient is discharged from hospital from getting her UTI treated, she wants to get some advise for the patient from Dr. Wei on what they can do to help patient from getting further UTI's and being sent to the Er for treatment. Please advise message.      Marcie said that if you would like to talk to her in regards of patient you can call her at [phone]

## 2022-10-12 NOTE — PROGRESS NOTES
Outpatient Care Management  Plan of Care Follow Up Visit    Patient: Patito Domingo  MRN: [id number]  Date of Service: 10/12/2022  Completed by: Marcie Thrasher RN  Referral Date: 08/12/2022    Reason for Visit   Patient presents with    OPCM Chart Review    Follow-up    Update Plan Of Care       Brief Summary:       Next steps from previous encounter    Follow up on contact from wound care about buttock xray  done and pt states she met with wound care today and she will continue to monitor  Fu on uti symptoms done and noted    Interventions  Chart reviewed  Reviewed upcoming appt schedule  Message to dr simental requesting possible referral to infectious disease    Assess/review short term goals met       Next steps  Review educational information on      Patient Summary     Involvement of Care:  Do I have permission to speak with other family members about your care?       Patient Reported Labs & Vitals:  1.  Any Patient Reported Labs & Vitals?     2.  Patient Reported Blood Pressure:     3.  Patient Reported Pulse:     4.  Patient Reported Weight (Kg):     5.  Patient Reported Blood Glucose (mg/dl):       Medical and social history was reviewed with patient and/or caregiver.     Clinical Assessment     Reviewed and provided basic information on available community resources for mental health, transportation, wellness resources, and palliative care programs with patient and/or caregiver.     Complex Care Plan     Care plan was discussed and completed today with input from patient and/or caregiver.    Patient Instructions     Instructions were provided via the [employer] [employer] patient resources and are available for the patient to view on the patient portal.        [facility] OPCM Self-Management Care Plan was developed with the patients/caregivers input and was based on identified barriers from todays assessment.  Goals were written today with the patient/caregiver and the patient has agreed to work towards these goals to  improve his/her overall well-being. Patient verbalized understanding of the care plan, goals, and all of today's instructions. Encouraged patient/caregiver to communicate with his/her physician and health care team about health conditions and the treatment plan.  Provided my contact information today and encouraged patient/caregiver to call me with any questions as needed.

## 2022-10-13 ENCOUNTER — TELEPHONE (OUTPATIENT)
Dept: UROLOGY | Facility: CLINIC | Age: 72
End: 2022-10-13
Payer: MEDICARE

## 2022-10-13 NOTE — TELEPHONE ENCOUNTER
Returned patient call concerning her symptoms. I voiced that if her symptoms are still persistent go to the ED for IV antibiotic treatment. Patient voiced her understanding on going Friday.

## 2022-10-13 NOTE — TELEPHONE ENCOUNTER
----- Message from Ran Ma sent at 10/13/2022  8:54 AM CDT -----  Type:  Needs Medical Advice    Who Called: self  Reason:regarding antibiotic  Would the patient rather a call back or a response via [facility]? call  Best Call Back Number: [phone]  Additional Information: none

## 2022-10-14 ENCOUNTER — HOSPITAL ENCOUNTER (EMERGENCY)
Facility: HOSPITAL | Age: 72
Discharge: HOME | End: 2022-10-14
Attending: EMERGENCY MEDICINE
Payer: MEDICARE

## 2022-10-14 VITALS
RESPIRATION RATE: 18 BRPM | BODY MASS INDEX: 27.76 KG/M2 | TEMPERATURE: 99 F | OXYGEN SATURATION: 100 % | SYSTOLIC BLOOD PRESSURE: 147 MMHG | DIASTOLIC BLOOD PRESSURE: 75 MMHG | HEART RATE: 79 BPM | WEIGHT: 172 LBS

## 2022-10-14 DIAGNOSIS — Z87.440 HISTORY OF RECURRENT URINARY TRACT INFECTION: ICD-10-CM

## 2022-10-14 DIAGNOSIS — N39.0 RECURRENT URINARY TRACT INFECTION: Primary | ICD-10-CM

## 2022-10-14 LAB
BACTERIA #/AREA URNS HPF: ABNORMAL /HPF
BILIRUB UR QL STRIP: NEGATIVE
CLARITY UR: ABNORMAL
COLOR UR: YELLOW
GLUCOSE UR QL STRIP: NEGATIVE
HGB UR QL STRIP: NEGATIVE
KETONES UR QL STRIP: NEGATIVE
LEUKOCYTE ESTERASE UR QL STRIP: ABNORMAL
MICROSCOPIC COMMENT: ABNORMAL
NITRITE UR QL STRIP: POSITIVE
PH UR STRIP: 6 [PH] (ref 5–8)
PROT UR QL STRIP: ABNORMAL
RBC #/AREA URNS HPF: 3 /HPF (ref 0–4)
SP GR UR STRIP: 1.02 (ref 1–1.03)
SQUAMOUS #/AREA URNS HPF: 2 /HPF
URN SPEC COLLECT METH UR: ABNORMAL
UROBILINOGEN UR STRIP-ACNC: NEGATIVE EU/DL
WBC #/AREA URNS HPF: 40 /HPF (ref 0–5)

## 2022-10-14 PROCEDURE — 87088 URINE BACTERIA CULTURE: CPT | Performed by: EMERGENCY MEDICINE

## 2022-10-14 PROCEDURE — 87186 SC STD MICRODIL/AGAR DIL: CPT | Performed by: EMERGENCY MEDICINE

## 2022-10-14 PROCEDURE — 87077 CULTURE AEROBIC IDENTIFY: CPT | Performed by: EMERGENCY MEDICINE

## 2022-10-14 PROCEDURE — 81000 URINALYSIS NONAUTO W/SCOPE: CPT | Performed by: EMERGENCY MEDICINE

## 2022-10-14 PROCEDURE — 25000003 PHARM REV CODE 250: Performed by: EMERGENCY MEDICINE

## 2022-10-14 PROCEDURE — 87086 URINE CULTURE/COLONY COUNT: CPT | Performed by: EMERGENCY MEDICINE

## 2022-10-14 PROCEDURE — 63600175 PHARM REV CODE 636 W HCPCS: Performed by: EMERGENCY MEDICINE

## 2022-10-14 PROCEDURE — 96374 THER/PROPH/DIAG INJ IV PUSH: CPT

## 2022-10-14 PROCEDURE — 99284 EMERGENCY DEPT VISIT MOD MDM: CPT | Mod: 25

## 2022-10-14 RX ORDER — OXYCODONE HYDROCHLORIDE 5 MG/1
5 TABLET ORAL ONCE
Status: COMPLETED | OUTPATIENT
Start: 2022-10-14 | End: 2022-10-14

## 2022-10-14 RX ORDER — AMLODIPINE BESYLATE 5 MG/1
10 TABLET ORAL
Status: COMPLETED | OUTPATIENT
Start: 2022-10-14 | End: 2022-10-14

## 2022-10-14 RX ORDER — CIPROFLOXACIN 500 MG/1
500 TABLET ORAL 2 TIMES DAILY
Qty: 20 TABLET | Refills: 0 | Status: SHIPPED | OUTPATIENT
Start: 2022-10-14 | End: 2022-10-24

## 2022-10-14 RX ADMIN — OXYCODONE HYDROCHLORIDE 5 MG: 5 TABLET ORAL at 11:10

## 2022-10-14 RX ADMIN — CEFTRIAXONE 1 G: 1 INJECTION, SOLUTION INTRAVENOUS at 10:10

## 2022-10-14 RX ADMIN — AMLODIPINE BESYLATE 10 MG: 5 TABLET ORAL at 11:10

## 2022-10-14 NOTE — TELEPHONE ENCOUNTER
Called and spoke with Marcie with [facility] in regards of Dr. Wei's message. She verbalized understanding of message. She wanted me to inform you that she has reached out to patient's Urologist in regards of this matter and they just informed pt to go to ER to seek treatment instead.  Just sharing as an FYI

## 2022-10-14 NOTE — ED PROVIDER NOTES
"Encounter Date: 10/14/2022       History     Chief Complaint   Patient presents with    Urinary Tract Infection     Pt was recently diagnosed with UTI by PCP, was not put on PO antibiotics, and was recommended to come into ED for IV, antibiotic therapy. Dysuria, "Stinging," and 7/10, suprapubic pain. Pt compliant with losartan and amlodipine and symptomatic: generalized HA; 8/10 pain. Denies visual disturbances.      Patient is a very pleasant 72-year-old female with a past medical history of hypertension, recurrent urinary tract infections (rUTIs) presents to the ED with complaint of dysuria.  Patient is currently being followed by Dr. Sebastian Parikh (uroliogy). She reports 3-4 days of worsening dysuria and suprapubic pain. She denies any fever, chills, new abdominal pain (she has a hx of chronic abd pain), vaginal discharge (recently treated for presumptive vulvovaginal candidiasis). She denies any current abx use.     Per chart check, there is concern that the patient's kidney stones may be the cause of her recurrent UTI.  The patient's urologist had a lengthy discussion with the patient regarding possible surgical intervention as a means to possibly alleviate recurrent urinary tract infections.  However, the pt's urologist is unsure whether not this is the cause of the patient's recurrent UTIs and he does not feel that intervention is worth the risk when compared to the possible benefits. Additionally the patient has been prescribed methenamine and vitamin C to possibly decrease recurrent UTIs.     Review of patient's allergies indicates:   Allergen Reactions    Contrast media Hives, Itching and Swelling    Epinephrine Anaphylaxis     Can cause  a Carcinoid Crisis    Ibuprofen Hives, Itching and Swelling    Zofran [ondansetron hcl] Itching     And multiple other reactions    Iodinated contrast media     Morphine Other (See Comments)    Sulfa (sulfonamide antibiotics) Hives, Itching and Swelling     Past Medical " History:   Diagnosis Date    Allergy     Arthritis     Cataract     Chronic diastolic (congestive) heart failure     Colon cancer     Encounter for blood transfusion     History of ESBL E. 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)      Past Surgical History:   Procedure Laterality Date    ABDOMINAL SURGERY      CATARACT EXTRACTION Left 10/2017     SECTION      CHOLECYSTECTOMY      COLON SURGERY      cystoscope      CYSTOSCOPY W/ RETROGRADES Right 10/10/2019    Procedure: CYSTOSCOPY, WITH RETROGRADE PYELOGRAM;  Surgeon: Gen Isbell MD;  Location: [facility];  Service: Urology;  Laterality: Right;    ERCP N/A 2021    Procedure: ERCP (ENDOSCOPIC RETROGRADE CHOLANGIOPANCREATOGRAPHY);  Surgeon: Jayjay Rivera MD;  Location: [facility] ([address] [place] [address]);  Service: Endoscopy;  Laterality: N/A;    ERCP N/A 3/4/2022    Procedure: ERCP (ENDOSCOPIC RETROGRADE CHOLANGIOPANCREATOGRAPHY);  Surgeon: Roby Virgen MD;  Location: [facility] ([address] [place] [address]);  Service: Endoscopy;  Laterality: N/A;    EYE SURGERY      HYSTERECTOMY  1996    LITHOTRIPSY      LIVER BIOPSY      carcinoid    URETEROSCOPY Right 10/10/2019    Procedure: URETEROSCOPY;  Surgeon: Gen Isbell MD;  Location: [facility];  Service: Urology;  Laterality: Right;    UTERINE FIBROID SURGERY       Family History   Problem Relation Age of Onset    Cancer Mother         unknown    Alzheimer's disease Father     Stroke Sister     No Known Problems Son     No Known Problems Son     No Known Problems Son     No Known Problems Son     Kidney disease Neg Hx      Social History     Tobacco Use    Smoking status: Never    Smokeless tobacco: Never   Substance Use Topics    Alcohol use: No     Alcohol/week: 0.0 standard drinks     Drug use: No     Review of Systems   Constitutional:  Negative for chills and fever.   Cardiovascular:  Negative for chest pain, palpitations and leg swelling.   Gastrointestinal:  Negative for abdominal pain, nausea and vomiting.   Genitourinary:  Positive for dysuria. Negative for difficulty urinating, frequency, hematuria, vaginal bleeding, vaginal discharge and vaginal pain.   Musculoskeletal:  Negative for back pain and myalgias.   Neurological:  Negative for dizziness and headaches.   Psychiatric/Behavioral:  Negative for agitation and confusion.      Physical Exam     Initial Vitals [10/14/22 0850]   BP Pulse Resp Temp SpO2   (!) 193/80 63 20 98.8 °F (37.1 °C) 99 %      MAP       --         Physical Exam    Constitutional: She appears well-developed and well-nourished.   HENT:   Head: Normocephalic and atraumatic.   Right Ear: External ear normal.   Left Ear: External ear normal.   Eyes: Conjunctivae and EOM are normal. Pupils are equal, round, and reactive to light.   Neck: Neck supple.   Normal range of motion.  Cardiovascular:  Normal rate, regular rhythm, normal heart sounds and intact distal pulses.           Pulmonary/Chest: Breath sounds normal.   Abdominal: Abdomen is soft. Bowel sounds are normal.   The abdomen is soft. There is no rebound or guarding. Normal bowel sounds in all four quadrants. Negative Irby's sign. Negative McBurney's point tenderness Negative heel tap. No masses, fluid wave, ecchymosis or other skin changes appreciated on physical exam. No CVA tenderness.      Genitourinary:    Genitourinary Comments: Soft tissue mass noted to the right buttock; no signs of active infection     Musculoskeletal:         General: Normal range of motion.      Cervical back: Normal range of motion and neck supple.     Neurological: She is alert and oriented to person, place, and time. She has normal strength.   Skin: Skin is warm. Capillary refill takes less than 2 seconds.       ED Course    Procedures  Labs Reviewed   URINALYSIS, REFLEX TO URINE CULTURE - Abnormal; Notable for the following components:       Result Value    Appearance, UA Hazy (*)     Protein, UA Trace (*)     Nitrite, UA Positive (*)     Leukocytes, UA 3+ (*)     All other components within normal limits    Narrative:     Specimen Source->Urine   URINALYSIS MICROSCOPIC - Abnormal; Notable for the following components:    WBC, UA 40 (*)     Bacteria Many (*)     All other components within normal limits    Narrative:     Specimen Source->Urine   CULTURE, URINE          Imaging Results    None          Medications   cefTRIAXone (ROCEPHIN) 1 g/50 mL D5W IVPB (1 g Intravenous New Bag 10/14/22 1055)   oxyCODONE immediate release tablet 5 mg (5 mg Oral Given 10/14/22 1136)   amLODIPine tablet 10 mg (10 mg Oral Given 10/14/22 1136)     Medical Decision Making:   Clinical Tests:   Lab Tests: Ordered and Reviewed  ED Management:  - VS notable for elevated BP (Pt did not take BP meds today); She is afebrile, non toxic appearing and in no acute distress  - UA demonstrates nitrite positive urine - pt administered IV ceftriaxone in the ED  - Ucx ordered, pending  - no indication for labs, advanced imaging at this time - the patient is not febrile; she does not meet SIRS criteria  - patient has a longstanding history of recurrent urinary tract infection; review of previous sensitivities demonstrates various antibiotics that could be considered appropriate; however, in light of her symptoms and history of urinary tract infection, will prescribe patient a 10 day supply of ciprofloxacin; will await sensitivities to adjust medicines if needed; patient is afebrile, no distress; however, patient given very strict return precautions for any new or worsening symptoms in light of her underlying urinary tract infection  - will discharge to home with rx for Ciprofloxacin   - No further intervention is indicated at this time after having taken into account  the patient's history, physical exam findings, and empirical and objective data obtained during the patient's emergency department workup.   - The patient is at low risk for an emergent medical condition at this time, and I am of the belief that that it is safe to discharge the patient from the emergency department.   - The patient is instructed to follow up as outpatient as indicated on the discharge paperwork.    - I have discussed the specifics of the workup with the patient and the patient has verbalized understanding of the details of the workup, the diagnosis, the treatment plan, and the need for outpatient follow-up.    - Although the patient has no emergent etiology today this does not preclude the development of an emergent condition so, in addition, I have advised the patient that they can return to the ED and/or activate EMS at any time with worsening of their symptoms, change of their symptoms, or with any other medical complaint.    - The patient remained comfortable and stable during their visit in the ED.    - Discharge and follow-up instructions discussed with the patient who expressed understanding and willingness to comply with my recommendations.  - Results of all emergency department tests  discussed thoroughly with patient; all patient questions answered; pt in agreement with plan  - Pt instructed to follow up with PCP in 2-3 days for recheck of today's complaints  - Pt given strict emergency department return precautions for any new or worsening of symptoms  - Pt discharged from the emergency department in stable condition, in no acute distress                            Clinical Impression:   Final diagnoses:  [N39.0] Recurrent urinary tract infection (Primary)  [Z87.440] History of recurrent urinary tract infection      ED Disposition Condition    Discharge Stable          ED Prescriptions       Medication Sig Dispense Start Date End Date Auth. Provider    ciprofloxacin HCl (CIPRO) 500 MG  tablet Take 1 tablet (500 mg total) by mouth 2 (two) times daily. for 10 days 20 tablet 10/14/2022 10/24/2022 John Alexandra MD          Follow-up Information       Follow up With Specialties Details Why Contact Info    Ervin Parikh MD Urology Schedule an appointment as soon as possible for a visit   [address]  [address]  [facility] [address]  [phone]               John Alexandra MD  10/14/22 9181

## 2022-10-17 LAB — BACTERIA UR CULT: ABNORMAL

## 2022-10-18 ENCOUNTER — OUTPATIENT CASE MANAGEMENT (OUTPATIENT)
Dept: ADMINISTRATIVE | Facility: OTHER | Age: 72
End: 2022-10-18
Payer: MEDICARE

## 2022-10-21 ENCOUNTER — PATIENT OUTREACH (OUTPATIENT)
Dept: ADMINISTRATIVE | Facility: OTHER | Age: 72
End: 2022-10-21
Payer: MEDICARE

## 2022-10-21 NOTE — PROGRESS NOTES
CHW - Follow Up    This Community Health Worker completed a follow up visit with patient via telephone today.  Pt/Caregiver reported: Patient stated she is currently in the hospital with a UTI.  She has not been able to reach medicaid to apply for CCW. She has called multiple times and waited on hold with no answer.   Community Health Worker provided: n/a Patient will contact CHW once relased from the hospital.   Follow up required: yes

## 2022-10-24 NOTE — PROGRESS NOTES
Outpatient Care Management  Plan of Care Follow Up Visit    Patient: Patito Domingo  MRN: [id number]  Date of Service: 10/18/2022  Completed by: Marcie Thrasher RN  Referral Date: 08/12/2022    Reason for Visit   Patient presents with    OPCM Chart Review       Brief Summary:   Pt to ed with recurrent uti.  Treated and dc with cipro.  Pt back to ed at  with worsening symptoms.  Pt treated and transferred to skilled nursing for continued iv antibiotics.    Case closed     Next steps from previous encounter      Interventions  Chart reviewed  Reviewed upcoming appt schedule    Assess/review short term goals met           Patient Summary     Involvement of Care:  Do I have permission to speak with other family members about your care?       Patient Reported Labs & Vitals:  1.  Any Patient Reported Labs & Vitals?     2.  Patient Reported Blood Pressure:     3.  Patient Reported Pulse:     4.  Patient Reported Weight (Kg):     5.  Patient Reported Blood Glucose (mg/dl):       Medical and social history was reviewed with patient and/or caregiver.     Clinical Assessment     Reviewed and provided basic information on available community resources for mental health, transportation, wellness resources, and palliative care programs with patient and/or caregiver.     Complex Care Plan     Care plan was discussed and completed today with input from patient and/or caregiver.    Patient Instructions     Instructions were provided via the [employer] patient resources and are available for the patient to view on the patient portal.        [facility] OPCM Self-Management Care Plan was developed with the patients/caregivers input and was based on identified barriers from todays assessment.  Goals were written today with the patient/caregiver and the patient has agreed to work towards these goals to improve his/her overall well-being. Patient verbalized understanding of the care plan, goals, and all of today's instructions. Encouraged  patient/caregiver to communicate with his/her physician and health care team about health conditions and the treatment plan.  Provided my contact information today and encouraged patient/caregiver to call me with any questions as needed.

## 2022-11-02 PROCEDURE — G0180 MD CERTIFICATION HHA PATIENT: HCPCS | Mod: ,,, | Performed by: STUDENT IN AN ORGANIZED HEALTH CARE EDUCATION/TRAINING PROGRAM

## 2022-11-02 PROCEDURE — G0180 PR HOME HEALTH MD CERTIFICATION: ICD-10-PCS | Mod: ,,, | Performed by: STUDENT IN AN ORGANIZED HEALTH CARE EDUCATION/TRAINING PROGRAM

## 2022-11-16 ENCOUNTER — EXTERNAL HOME HEALTH (OUTPATIENT)
Dept: HOME HEALTH SERVICES | Facility: HOSPITAL | Age: 72
End: 2022-11-16
Payer: MEDICARE

## 2022-11-17 ENCOUNTER — DOCUMENT SCAN (OUTPATIENT)
Dept: HOME HEALTH SERVICES | Facility: HOSPITAL | Age: 72
End: 2022-11-17
Payer: MEDICARE

## 2022-12-02 ENCOUNTER — DOCUMENT SCAN (OUTPATIENT)
Dept: HOME HEALTH SERVICES | Facility: HOSPITAL | Age: 72
End: 2022-12-02
Payer: MEDICARE

## 2022-12-05 ENCOUNTER — TELEPHONE (OUTPATIENT)
Dept: FAMILY MEDICINE | Facility: CLINIC | Age: 72
End: 2022-12-05
Payer: MEDICARE

## 2022-12-05 NOTE — TELEPHONE ENCOUNTER
I cannot see the urine culture results since she went outside [facility]; if they can send copy of results then I can look but it may have not resulted yet. Someone from the ER should be calling if the medication needs to be changed.

## 2022-12-05 NOTE — TELEPHONE ENCOUNTER
----- Message from Iris Sweeney sent at 12/5/2022 11:49 AM CST -----  Type:  Positive urine culture     Who Called:ANDRE Pantoja   Would the patient rather a call back or a response via [facility]? Call   Best Call Back Number:    Additional Information:     Reporting  Positive urine culture    Scarlett would like a call back pt is there now

## 2022-12-05 NOTE — TELEPHONE ENCOUNTER
Spoke with Scarlett at [facility]- she was calling to inform you patient urine culture came back positive- Scarlett report that patient was seen at the ER over the weekend wanted to make sure if patient was on the right antibiotic.

## 2022-12-28 ENCOUNTER — TELEPHONE (OUTPATIENT)
Dept: FAMILY MEDICINE | Facility: CLINIC | Age: 72
End: 2022-12-28
Payer: MEDICARE

## 2022-12-28 NOTE — TELEPHONE ENCOUNTER
----- Message from Michelle Sandra sent at 12/28/2022 10:30 AM CST -----  ORDERS REQUEST    Lucia ( from [facility]) called indicating patient is about to be discharged from an in patient admission on 12/30/22 and is recommended a skilled nurse to continue to monitor her boil at home. [facility] will only accept Home Health orders from patient's PCP. Please send orders to [facility]. Please call with confirmation once done: [phone] (direct line)

## 2022-12-28 NOTE — TELEPHONE ENCOUNTER
Spoke with  Ms. Myers with [facility] in regards of message on patient. Ms. Myers informed me that pt is being discharged on 12/30/2022 from having a boil on her bottom packed and drained, and they are needing pt to have it monitored at home.  Ms. Myers is asking for home health orders for [facility]  for skilled treatment, so that pt can have care at home for her boil. Please advise message and place order for [facility].

## 2022-12-29 ENCOUNTER — TELEPHONE (OUTPATIENT)
Dept: FAMILY MEDICINE | Facility: CLINIC | Age: 72
End: 2022-12-29
Payer: MEDICARE

## 2022-12-29 DIAGNOSIS — L89.311 PRESSURE INJURY OF RIGHT BUTTOCK, STAGE 1: Primary | ICD-10-CM

## 2022-12-29 NOTE — TELEPHONE ENCOUNTER
Spoke with  Ms. Myers with [facility] in regards of message on patient. Ms. Myers informed me that pt is being discharged on 12/30/2022 from having a boil on her bottom packed and drained, and they are needing pt to have it monitored at home.  Ms. Myers is asking for home health orders for [facility]  for skilled treatment, so that pt can have care at home for her boil. Please advise message and place order for [facility].

## 2022-12-29 NOTE — TELEPHONE ENCOUNTER
----- Message from Andre Queen sent at 12/29/2022 10:31 AM CST -----  Contact: [facility]   .Type:  Needs Medical Advice    Who Called:    Lucia ( from [facility]) called indicating patient is about to be discharged from an in patient admission on 12/30/22 and is recommended a skilled nurse to continue to monitor her boil at home. [facility] will only accept Home Health orders from patient's PCP. Please send orders to [facility]. Please call with confirmation once done: [phone] (direct line)   Pt. Was on home health just to renew orders for her going back home.

## 2022-12-29 NOTE — TELEPHONE ENCOUNTER
----- Message from Andre Queen sent at 12/29/2022 10:31 AM CST -----  Contact: [facility]   .Type:  Needs Medical Advice    Who Called:    Lucia ( from [facility]) called indicating patient is about to be discharged from an in patient admission on 12/30/22 and is recommended a skilled nurse to continue to monitor her boil at home. [facility] will only accept Home Health orders from patient's PCP. Please send orders to [facility]. Please call with confirmation once done: [phone] (direct line)   Pt. Was on home health just to renew orders for her going back home.

## 2023-01-01 PROCEDURE — G0179 PR HOME HEALTH MD RECERTIFICATION: ICD-10-PCS | Mod: ,,, | Performed by: STUDENT IN AN ORGANIZED HEALTH CARE EDUCATION/TRAINING PROGRAM

## 2023-01-01 PROCEDURE — G0179 MD RECERTIFICATION HHA PT: HCPCS | Mod: ,,, | Performed by: STUDENT IN AN ORGANIZED HEALTH CARE EDUCATION/TRAINING PROGRAM

## 2023-01-17 ENCOUNTER — TELEPHONE (OUTPATIENT)
Dept: UROLOGY | Facility: CLINIC | Age: 73
End: 2023-01-17
Payer: MEDICARE

## 2023-01-17 NOTE — TELEPHONE ENCOUNTER
Received call from Dr May Lopez (family medicine team) patient currently admitted to [facility] with UTI (MRDO causing IV ABX) will be discharged to SNF with meropenem 2-4 week course.  Also has bilateral Staghorn calculus.  Its presumed this maybe causing the UTIs.  Dr Roberts has been consulted several times when patient admitted to hospital, but does not follow up.  Team discussed patient is established with you and may need intervention.  Would like to further discuss with you.  Will have her cell on sticky note on your desk.

## 2023-01-24 ENCOUNTER — DOCUMENT SCAN (OUTPATIENT)
Dept: HOME HEALTH SERVICES | Facility: HOSPITAL | Age: 73
End: 2023-01-24
Payer: MEDICARE

## 2023-01-27 ENCOUNTER — EXTERNAL HOSPITAL ADMISSION (OUTPATIENT)
Dept: ADMINISTRATIVE | Facility: CLINIC | Age: 73
End: 2023-01-27
Payer: MEDICARE

## 2023-01-27 ENCOUNTER — PATIENT OUTREACH (OUTPATIENT)
Dept: ADMINISTRATIVE | Facility: CLINIC | Age: 73
End: 2023-01-27
Payer: MEDICARE

## 2023-01-31 ENCOUNTER — EXTERNAL HOME HEALTH (OUTPATIENT)
Dept: HOME HEALTH SERVICES | Facility: HOSPITAL | Age: 73
End: 2023-01-31
Payer: MEDICARE

## 2023-02-01 ENCOUNTER — TELEPHONE (OUTPATIENT)
Dept: UROLOGY | Facility: CLINIC | Age: 73
End: 2023-02-01
Payer: MEDICARE

## 2023-02-01 NOTE — TELEPHONE ENCOUNTER
Returned call, spoke with son.  Her sons would like to talk with Dr Parikh to see what options they have.  [facility] specialist advised they consult Dr Parikh again.

## 2023-02-01 NOTE — TELEPHONE ENCOUNTER
----- Message from Chase Allison MA sent at 2/1/2023  4:52 PM CST -----  Contact: [phone]  Pt's son Jessica is calling to see if a telephone visit can be done to discuss kidney stones. He reports pt is currently at [facility], and they stated they did not feel comfortable with moving forward with surgery. Please reach out to pt's son at [phone].

## 2023-02-03 ENCOUNTER — PATIENT OUTREACH (OUTPATIENT)
Dept: ADMINISTRATIVE | Facility: CLINIC | Age: 73
End: 2023-02-03
Payer: MEDICARE

## 2023-02-03 NOTE — PROGRESS NOTES
C3 nurse spoke with Patito Domingo for a TCC post hospital discharge follow up call. The patient has a scheduled \[facility]\"" appointment with Dr. Ervin Parikh,Urology 2/7/23.    Medications not listed:    Pyridium 200mg 3 times a day  Elavil 10 mg once daily

## 2023-02-09 ENCOUNTER — PATIENT OUTREACH (OUTPATIENT)
Dept: ADMINISTRATIVE | Facility: HOSPITAL | Age: 73
End: 2023-02-09
Payer: MEDICARE

## 2023-02-09 NOTE — PROGRESS NOTES
Health Maintenance Due   Topic Date Due    TETANUS VACCINE  Never done    Shingles Vaccine (1 of 2) Never done    COVID-19 Vaccine (3 - Booster for Pfizer series) 10/09/2021    Mammogram  11/12/2021     Chart review done.  updated. Immunizations reviewed & updated. Care Everywhere updated.  Chart review completed for [employer] gap report.

## 2023-02-24 ENCOUNTER — TELEPHONE (OUTPATIENT)
Dept: FAMILY MEDICINE | Facility: CLINIC | Age: 73
End: 2023-02-24
Payer: MEDICARE

## 2023-02-24 ENCOUNTER — EXTERNAL HOSPITAL ADMISSION (OUTPATIENT)
Dept: ADMINISTRATIVE | Facility: CLINIC | Age: 73
End: 2023-02-24
Payer: MEDICARE

## 2023-02-24 ENCOUNTER — PATIENT OUTREACH (OUTPATIENT)
Dept: ADMINISTRATIVE | Facility: CLINIC | Age: 73
End: 2023-02-24
Payer: MEDICARE

## 2023-02-24 NOTE — PROGRESS NOTES
C3 nurse attempted to contact Patito Domingo for a TCC post hospital discharge follow up call. No answer. Left voicemail with callback information. The patient does not have a scheduled HOSFU appointment. Message sent to PCP staff for assistance with scheduling visit with patient.

## 2023-02-24 NOTE — TELEPHONE ENCOUNTER
Your soonest hos fu is march 13. Do you want me to put her with DK or you have a np spot on march 9th     Please advise.

## 2023-02-28 NOTE — PROGRESS NOTES
C3 nurse spoke with Patito Domingo for a TCC post hospital discharge follow up call. The patient has a scheduled HOS appointment with Harjit Carlson NP (PCP's partner) on  3/1/23 @ 9:00am.    Patient is requesting referral for outpatient PT, and an aid to come daily in the mornings to assist her in getting up and moving. She has applied to the Waiver Program, but has not heard back about it. Number for the program is [phone].     Medications not listed are: IV antibiotics, Loperamine 200mg PRN diarrhea, and Pyridium (unsure if this is the new one she is taking)

## 2023-03-01 ENCOUNTER — TELEPHONE (OUTPATIENT)
Dept: FAMILY MEDICINE | Facility: CLINIC | Age: 73
End: 2023-03-01
Payer: MEDICARE

## 2023-03-01 DIAGNOSIS — M25.511 CHRONIC RIGHT SHOULDER PAIN: ICD-10-CM

## 2023-03-01 DIAGNOSIS — G89.4 CHRONIC PAIN SYNDROME: ICD-10-CM

## 2023-03-01 DIAGNOSIS — G89.29 CHRONIC RIGHT SHOULDER PAIN: ICD-10-CM

## 2023-03-01 RX ORDER — LIDOCAINE 50 MG/G
PATCH TOPICAL
Qty: 90 PATCH | OUTPATIENT
Start: 2023-03-01

## 2023-03-01 RX ORDER — LIDOCAINE 50 MG/G
1 PATCH TOPICAL DAILY
Qty: 30 PATCH | Refills: 1 | Status: SHIPPED | OUTPATIENT
Start: 2023-03-01 | End: 2023-03-03 | Stop reason: SDUPTHER

## 2023-03-01 NOTE — TELEPHONE ENCOUNTER
Got pt rescheduled to see DK for her hospital fu on 3/7/23 at 8:30     Is wanting a refill on the lidocaine patch and a stronger strength for the Diclofenac sodium gel.   She is also wanting you to send in a RX for compression stocking XL     Please advise.

## 2023-03-01 NOTE — TELEPHONE ENCOUNTER
Pt is wanting a stronger strength for the Diclofenac sodium gel.   She is also wanting you to send in a RX for compression stocking XL

## 2023-03-01 NOTE — TELEPHONE ENCOUNTER
----- Message from Eric Hernandez sent at 3/1/2023 12:06 PM CST -----  Type:  RX Refill Request    Who Called: pt  Refill or New Rx: refill and new  RX Name and Strength: LIDOcaine (LIDODERM) 5 % 30 patch 1, compressions stockings (size XL), diclofenac sodium (VOLTAREN) 1 % Gel    Preferred Pharmacy:    Ordering Provider:  Reach pt via:  Best Call Back Number: [phone]  Additional Information: pt requests a higher percent for the Diclofenac

## 2023-03-01 NOTE — TELEPHONE ENCOUNTER
Left a voice mail stating that dr khan called in the lidocaine patches but the stronger gel and the compression stocking are going to have to be discussed at the appoint with LAURA on Tuesday.

## 2023-03-02 ENCOUNTER — TELEPHONE (OUTPATIENT)
Dept: FAMILY MEDICINE | Facility: CLINIC | Age: 73
End: 2023-03-02
Payer: MEDICARE

## 2023-03-02 PROCEDURE — G0179 MD RECERTIFICATION HHA PT: HCPCS | Mod: ,,, | Performed by: STUDENT IN AN ORGANIZED HEALTH CARE EDUCATION/TRAINING PROGRAM

## 2023-03-02 PROCEDURE — G0179 PR HOME HEALTH MD RECERTIFICATION: ICD-10-PCS | Mod: ,,, | Performed by: STUDENT IN AN ORGANIZED HEALTH CARE EDUCATION/TRAINING PROGRAM

## 2023-03-02 NOTE — TELEPHONE ENCOUNTER
----- Message from Corbin Preston sent at 3/2/2023 10:47 AM CST -----  Contact: pt  Type: Requesting to speak with nurse        Who Called: PT  Regarding: order for xl compression stockings and issues with LIDOcaine (LIDODERM) 5 %. States its not at the pharmacy. She spoke with them today. Patient would like to speak with the office.  Would the patient rather a call back or a response via [facility]? Call back  Best Call Back Number: [phone]  Additional Information:

## 2023-03-02 NOTE — TELEPHONE ENCOUNTER
Pharmacy saying that she has to pay bc dr khan didn't authorize for the insurance to pay for it, told pt I would call the pharmacy and see what was going on and see why all of a sudden she has to pay for the medication when she hasn't been having to then I will call her back.

## 2023-03-02 NOTE — TELEPHONE ENCOUNTER
----- Message from Troy Black sent at 3/2/2023 10:19 AM CST -----  .Type:  RX Refill Request    Who Called: pt  Refill or New Rx:refill  RX Name and Strength:LIDOcaine (LIDODERM) 5 %  How is the patient currently taking it? (ex. 1XDay):Place 1 patch onto the skin once daily. Remove & Discard patch within 12 hours or as directed by MD - Transdermal  Is this a 30 day or 90 day RX:30 patch  Preferred Pharmacy with phone number:[id number] - [place], [place] - [address] [facility] [address]  Local or Mail Order:local  Ordering Provider:Champagne  Would the patient rather a call back or a response via [facility]? Call back  Best Call Back Number:[phone]  Additional Information:      Refill or New Rx:refill  RX Name and Strength:diclofenac sodium (VOLTAREN) 1 % Gel  How is the patient currently taking it? (ex. 1XDay):  Is this a 30 day or 90 day RX:

## 2023-03-02 NOTE — TELEPHONE ENCOUNTER
Informed pt again that she would have to discuss the strength increase of a medication a the appointment with LAURA Tuesday and she can also discuss the compression stockings as well but if she doesn't want to wait that long judi [facility] sells compression stockings she can look into getting one in the mean time.      She spoke with [facility] and they did receive the rx dr khan sent they just haven't filled it yet according to what pt said. Informed pt that she needs to call them back and see if they filled it so that she can see about going pick it up.   Pt said okay then hung up.

## 2023-03-02 NOTE — TELEPHONE ENCOUNTER
----- Message from Ran Ma sent at 3/2/2023  1:28 PM CST -----  Type:  Needs Medical Advice    Who Called: self  Reason:pharmacy wont give her medicine without hearing from you   Would the patient rather a call back or a response via [facility]? call  Best Call Back Number:[phone]  Additional Information: none

## 2023-03-02 NOTE — TELEPHONE ENCOUNTER
Tried to call the [facility] in [place] they are closed for lunch until 2. Called pt to inform her of this and that I will call them after 2

## 2023-03-03 ENCOUNTER — TELEPHONE (OUTPATIENT)
Dept: FAMILY MEDICINE | Facility: CLINIC | Age: 73
End: 2023-03-03
Payer: MEDICARE

## 2023-03-03 DIAGNOSIS — G89.29 CHRONIC RIGHT SHOULDER PAIN: ICD-10-CM

## 2023-03-03 DIAGNOSIS — M25.511 CHRONIC RIGHT SHOULDER PAIN: ICD-10-CM

## 2023-03-03 DIAGNOSIS — G89.4 CHRONIC PAIN SYNDROME: ICD-10-CM

## 2023-03-03 RX ORDER — LIDOCAINE 50 MG/G
1 PATCH TOPICAL DAILY
Qty: 30 PATCH | Refills: 3 | Status: SHIPPED | OUTPATIENT
Start: 2023-03-03 | End: 2023-09-26 | Stop reason: CLARIF

## 2023-03-03 NOTE — TELEPHONE ENCOUNTER
The PA for the Lidocaine was denied. Its asking if you want to appeal the decision.     Please advise.

## 2023-03-03 NOTE — TELEPHONE ENCOUNTER
Called pt to inform her that the the PA was denied and dr khan went ahead and called the medication into the [facility] to see if they would give her the alternate medication there.     Pt asked why all of a sudden her insurance isn't covering them when shes been getting them. I told her that she would have to ask her insurance company.     Told her to call the pharmacy to see if she would have to pay for them there before she drove all the way over here. Pt verbalized her under standing.

## 2023-03-03 NOTE — TELEPHONE ENCOUNTER
Spoke to Genoveva at [facility] in [place] and she stated that the insurance company wants a prior authorization on the lidocaine patches.     Spoke with pt and let her know that the medication need a prior authorization. Pt doesn't understand why she needs that when they covered it before. I told her I understand her frustration but this is what the insurance company is wanting. Told her we would work on the PA and let her know as soon as they let us know their determining. Pt didn't say anything. She just hung up.

## 2023-03-07 ENCOUNTER — TELEPHONE (OUTPATIENT)
Dept: FAMILY MEDICINE | Facility: CLINIC | Age: 73
End: 2023-03-07
Payer: MEDICARE

## 2023-03-07 ENCOUNTER — HOSPITAL ENCOUNTER (OUTPATIENT)
Facility: HOSPITAL | Age: 73
Discharge: HOME | End: 2023-03-09
Attending: EMERGENCY MEDICINE | Admitting: STUDENT IN AN ORGANIZED HEALTH CARE EDUCATION/TRAINING PROGRAM
Payer: MEDICARE

## 2023-03-07 DIAGNOSIS — M79.89 ARM SWELLING: ICD-10-CM
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
## 2023-03-07 DIAGNOSIS — R53.83 FATIGUE: ICD-10-CM

## 2023-03-07 DIAGNOSIS — C7B.00 METASTATIC CARCINOID TUMOR: Chronic | ICD-10-CM

## 2023-03-07 DIAGNOSIS — R55 NEAR SYNCOPE: ICD-10-CM

## 2023-03-07 DIAGNOSIS — R07.9 CHEST PAIN: ICD-10-CM

## 2023-03-07 DIAGNOSIS — R53.81 DEBILITY: Primary | ICD-10-CM

## 2023-03-07 LAB
ALBUMIN SERPL BCP-MCNC: 2.8 G/DL (ref 3.5–5.2)
ALP SERPL-CCNC: 102 U/L (ref 55–135)
ALT SERPL W/O P-5'-P-CCNC: 23 U/L (ref 10–44)
ANION GAP SERPL CALC-SCNC: 9 MMOL/L (ref 8–16)
AST SERPL-CCNC: 23 U/L (ref 10–40)
BACTERIA #/AREA URNS HPF: ABNORMAL /HPF
BASOPHILS # BLD AUTO: 0.05 K/UL (ref 0–0.2)
BASOPHILS NFR BLD: 1 % (ref 0–1.9)
BILIRUB SERPL-MCNC: 0.7 MG/DL (ref 0.1–1)
BILIRUB UR QL STRIP: NEGATIVE
BUN SERPL-MCNC: 11 MG/DL (ref 8–23)
CALCIUM SERPL-MCNC: 8.7 MG/DL (ref 8.7–10.5)
CHLORIDE SERPL-SCNC: 110 MMOL/L (ref 95–110)
CLARITY UR: CLEAR
CO2 SERPL-SCNC: 25 MMOL/L (ref 23–29)
COLOR UR: YELLOW
CREAT SERPL-MCNC: 0.9 MG/DL (ref 0.5–1.4)
DIFFERENTIAL METHOD: ABNORMAL
EOSINOPHIL # BLD AUTO: 0.4 K/UL (ref 0–0.5)
EOSINOPHIL NFR BLD: 7.1 % (ref 0–8)
ERYTHROCYTE [DISTWIDTH] IN BLOOD BY AUTOMATED COUNT: 18.6 % (ref 11.5–14.5)
EST. GFR  (NO RACE VARIABLE): >60 ML/MIN/1.73 M^2
GLUCOSE SERPL-MCNC: 95 MG/DL (ref 70–110)
GLUCOSE UR QL STRIP: NEGATIVE
HCT VFR BLD AUTO: 26.2 % (ref 37–48.5)
HGB BLD-MCNC: 8 G/DL (ref 12–16)
HGB UR QL STRIP: ABNORMAL
HYALINE CASTS #/AREA URNS LPF: 1 /LPF
IMM GRANULOCYTES # BLD AUTO: 0.02 K/UL (ref 0–0.04)
IMM GRANULOCYTES NFR BLD AUTO: 0.4 % (ref 0–0.5)
KETONES UR QL STRIP: NEGATIVE
LEUKOCYTE ESTERASE UR QL STRIP: ABNORMAL
LYMPHOCYTES # BLD AUTO: 1.1 K/UL (ref 1–4.8)
LYMPHOCYTES NFR BLD: 20.7 % (ref 18–48)
MAGNESIUM SERPL-MCNC: 1.5 MG/DL (ref 1.6–2.6)
MCH RBC QN AUTO: 29.7 PG (ref 27–31)
MCHC RBC AUTO-ENTMCNC: 30.5 G/DL (ref 32–36)
MCV RBC AUTO: 97 FL (ref 82–98)
MICROSCOPIC COMMENT: ABNORMAL
MONOCYTES # BLD AUTO: 0.4 K/UL (ref 0.3–1)
MONOCYTES NFR BLD: 8.4 % (ref 4–15)
NEUTROPHILS # BLD AUTO: 3.3 K/UL (ref 1.8–7.7)
NEUTROPHILS NFR BLD: 62.4 % (ref 38–73)
NITRITE UR QL STRIP: NEGATIVE
NRBC BLD-RTO: 0 /100 WBC
PH UR STRIP: 6 [PH] (ref 5–8)
PLATELET # BLD AUTO: 211 K/UL (ref 150–450)
PMV BLD AUTO: 10.2 FL (ref 9.2–12.9)
POTASSIUM SERPL-SCNC: 3 MMOL/L (ref 3.5–5.1)
PROT SERPL-MCNC: 6.3 G/DL (ref 6–8.4)
PROT UR QL STRIP: ABNORMAL
RBC # BLD AUTO: 2.69 M/UL (ref 4–5.4)
RBC #/AREA URNS HPF: 16 /HPF (ref 0–4)
SODIUM SERPL-SCNC: 144 MMOL/L (ref 136–145)
SP GR UR STRIP: 1.01 (ref 1–1.03)
SQUAMOUS #/AREA URNS HPF: 1 /HPF
TROPONIN I SERPL DL<=0.01 NG/ML-MCNC: <0.006 NG/ML (ref 0–0.03)
URN SPEC COLLECT METH UR: ABNORMAL
UROBILINOGEN UR STRIP-ACNC: ABNORMAL EU/DL
WBC # BLD AUTO: 5.23 K/UL (ref 3.9–12.7)
WBC #/AREA URNS HPF: 23 /HPF (ref 0–5)
YEAST URNS QL MICRO: ABNORMAL

## 2023-03-07 PROCEDURE — G0378 HOSPITAL OBSERVATION PER HR: HCPCS | Mod: HCNC

## 2023-03-07 PROCEDURE — 25000003 PHARM REV CODE 250: Mod: HCNC | Performed by: STUDENT IN AN ORGANIZED HEALTH CARE EDUCATION/TRAINING PROGRAM

## 2023-03-07 PROCEDURE — 84484 ASSAY OF TROPONIN QUANT: CPT | Mod: HCNC | Performed by: NURSE PRACTITIONER

## 2023-03-07 PROCEDURE — 80053 COMPREHEN METABOLIC PANEL: CPT | Mod: HCNC | Performed by: NURSE PRACTITIONER

## 2023-03-07 PROCEDURE — 85025 COMPLETE CBC W/AUTO DIFF WBC: CPT | Mod: HCNC | Performed by: NURSE PRACTITIONER

## 2023-03-07 PROCEDURE — 93010 EKG 12-LEAD: ICD-10-PCS | Mod: HCNC,,, | Performed by: INTERNAL MEDICINE

## 2023-03-07 PROCEDURE — 63600175 PHARM REV CODE 636 W HCPCS: Mod: HCNC | Performed by: EMERGENCY MEDICINE

## 2023-03-07 PROCEDURE — 81000 URINALYSIS NONAUTO W/SCOPE: CPT | Mod: HCNC | Performed by: NURSE PRACTITIONER

## 2023-03-07 PROCEDURE — 99285 EMERGENCY DEPT VISIT HI MDM: CPT | Mod: 25,HCNC

## 2023-03-07 PROCEDURE — 87040 BLOOD CULTURE FOR BACTERIA: CPT | Mod: HCNC | Performed by: EMERGENCY MEDICINE

## 2023-03-07 PROCEDURE — 87086 URINE CULTURE/COLONY COUNT: CPT | Mod: HCNC | Performed by: NURSE PRACTITIONER

## 2023-03-07 PROCEDURE — 93005 ELECTROCARDIOGRAM TRACING: CPT | Mod: HCNC

## 2023-03-07 PROCEDURE — 93010 ELECTROCARDIOGRAM REPORT: CPT | Mod: HCNC,,, | Performed by: INTERNAL MEDICINE

## 2023-03-07 PROCEDURE — 96365 THER/PROPH/DIAG IV INF INIT: CPT | Mod: HCNC,59

## 2023-03-07 PROCEDURE — 25000003 PHARM REV CODE 250: Mod: HCNC | Performed by: EMERGENCY MEDICINE

## 2023-03-07 PROCEDURE — 83735 ASSAY OF MAGNESIUM: CPT | Mod: HCNC | Performed by: NURSE PRACTITIONER

## 2023-03-07 RX ORDER — TALC
6 POWDER (GRAM) TOPICAL NIGHTLY PRN
Status: DISCONTINUED | OUTPATIENT
Start: 2023-03-07 | End: 2023-03-09 | Stop reason: HOSPADM

## 2023-03-07 RX ORDER — LANOLIN ALCOHOL/MO/W.PET/CERES
1000 CREAM (GRAM) TOPICAL DAILY
Status: DISCONTINUED | OUTPATIENT
Start: 2023-03-08 | End: 2023-03-09 | Stop reason: HOSPADM

## 2023-03-07 RX ORDER — LOSARTAN POTASSIUM 50 MG/1
50 TABLET ORAL DAILY
Status: DISCONTINUED | OUTPATIENT
Start: 2023-03-08 | End: 2023-03-08

## 2023-03-07 RX ORDER — NALOXONE HYDROCHLORIDE 4 MG/.1ML
SPRAY NASAL
COMMUNITY
Start: 2022-09-11 | End: 2023-03-07

## 2023-03-07 RX ORDER — TROLAMINE SALICYLATE 10 G/100G
CREAM TOPICAL
COMMUNITY
End: 2023-09-26 | Stop reason: CLARIF

## 2023-03-07 RX ORDER — MAG HYDROX/ALUMINUM HYD/SIMETH 200-200-20
30 SUSPENSION, ORAL (FINAL DOSE FORM) ORAL 4 TIMES DAILY PRN
Status: DISCONTINUED | OUTPATIENT
Start: 2023-03-07 | End: 2023-03-09 | Stop reason: HOSPADM

## 2023-03-07 RX ORDER — SODIUM CHLORIDE 9 MG/ML
INJECTION, SOLUTION INTRAVENOUS CONTINUOUS
Status: DISCONTINUED | OUTPATIENT
Start: 2023-03-07 | End: 2023-03-07

## 2023-03-07 RX ORDER — MINOCYCLINE HYDROCHLORIDE 100 MG/1
100 TABLET ORAL
COMMUNITY
Start: 2023-01-26 | End: 2023-03-07

## 2023-03-07 RX ORDER — OXYCODONE HYDROCHLORIDE 15 MG/1
15 TABLET ORAL 4 TIMES DAILY PRN
COMMUNITY
Start: 2022-11-10 | End: 2023-12-11 | Stop reason: SDUPTHER

## 2023-03-07 RX ORDER — MINOCYCLINE HYDROCHLORIDE 100 MG/1
100 CAPSULE ORAL 2 TIMES DAILY
COMMUNITY
Start: 2023-01-27 | End: 2023-03-07

## 2023-03-07 RX ORDER — ACETAMINOPHEN 325 MG/1
650 TABLET ORAL EVERY 8 HOURS PRN
Status: DISCONTINUED | OUTPATIENT
Start: 2023-03-07 | End: 2023-03-09 | Stop reason: HOSPADM

## 2023-03-07 RX ORDER — COVID-19 MOLECULAR TEST ASSAY
KIT MISCELLANEOUS
COMMUNITY
Start: 2023-01-26 | End: 2023-09-26 | Stop reason: CLARIF

## 2023-03-07 RX ORDER — DEXTROSE 40 %
15 GEL (GRAM) ORAL
Status: DISCONTINUED | OUTPATIENT
Start: 2023-03-07 | End: 2023-03-09 | Stop reason: HOSPADM

## 2023-03-07 RX ORDER — MULTIVITAMIN
1 TABLET ORAL
COMMUNITY
Start: 2022-12-31 | End: 2023-03-07

## 2023-03-07 RX ORDER — LABETALOL HYDROCHLORIDE 5 MG/ML
10 INJECTION, SOLUTION INTRAVENOUS EVERY 4 HOURS PRN
Status: DISCONTINUED | OUTPATIENT
Start: 2023-03-07 | End: 2023-03-09 | Stop reason: HOSPADM

## 2023-03-07 RX ORDER — ONDANSETRON 8 MG/1
8 TABLET, ORALLY DISINTEGRATING ORAL EVERY 8 HOURS PRN
Status: DISCONTINUED | OUTPATIENT
Start: 2023-03-07 | End: 2023-03-07

## 2023-03-07 RX ORDER — METHOCARBAMOL 750 MG/1
TABLET, FILM COATED ORAL
COMMUNITY
Start: 2023-01-26 | End: 2023-03-07

## 2023-03-07 RX ORDER — SIMETHICONE 80 MG
1 TABLET,CHEWABLE ORAL 4 TIMES DAILY PRN
Status: DISCONTINUED | OUTPATIENT
Start: 2023-03-07 | End: 2023-03-09 | Stop reason: HOSPADM

## 2023-03-07 RX ORDER — GLUCAGON 1 MG
1 KIT INJECTION
Status: DISCONTINUED | OUTPATIENT
Start: 2023-03-07 | End: 2023-03-09 | Stop reason: HOSPADM

## 2023-03-07 RX ORDER — METOPROLOL TARTRATE 25 MG/1
25 TABLET, FILM COATED ORAL 2 TIMES DAILY
COMMUNITY
Start: 2022-11-16 | End: 2023-08-28 | Stop reason: SDUPTHER

## 2023-03-07 RX ORDER — OXYCODONE HYDROCHLORIDE 5 MG/1
15 TABLET ORAL 4 TIMES DAILY PRN
Status: DISCONTINUED | OUTPATIENT
Start: 2023-03-07 | End: 2023-03-09 | Stop reason: HOSPADM

## 2023-03-07 RX ORDER — OXYCODONE HYDROCHLORIDE 5 MG/1
10 TABLET ORAL
Status: COMPLETED | OUTPATIENT
Start: 2023-03-07 | End: 2023-03-07

## 2023-03-07 RX ORDER — SODIUM CHLORIDE 9 MG/ML
INJECTION, SOLUTION INTRAVENOUS DAILY
COMMUNITY
Start: 2023-03-02 | End: 2023-09-26 | Stop reason: CLARIF

## 2023-03-07 RX ORDER — NALOXONE HCL 0.4 MG/ML
0.02 VIAL (ML) INJECTION
Status: DISCONTINUED | OUTPATIENT
Start: 2023-03-07 | End: 2023-03-09 | Stop reason: HOSPADM

## 2023-03-07 RX ORDER — SODIUM CHLORIDE 0.9 % (FLUSH) 0.9 %
3 SYRINGE (ML) INJECTION EVERY 12 HOURS PRN
Status: DISCONTINUED | OUTPATIENT
Start: 2023-03-07 | End: 2023-03-09 | Stop reason: HOSPADM

## 2023-03-07 RX ORDER — POTASSIUM CHLORIDE 20 MEQ/1
40 TABLET, EXTENDED RELEASE ORAL
Status: COMPLETED | OUTPATIENT
Start: 2023-03-07 | End: 2023-03-07

## 2023-03-07 RX ORDER — ATORVASTATIN CALCIUM 40 MG/1
40 TABLET, FILM COATED ORAL NIGHTLY
Status: DISCONTINUED | OUTPATIENT
Start: 2023-03-07 | End: 2023-03-09 | Stop reason: HOSPADM

## 2023-03-07 RX ORDER — AMLODIPINE BESYLATE 5 MG/1
10 TABLET ORAL DAILY
COMMUNITY
Start: 2023-02-03 | End: 2023-09-26 | Stop reason: CLARIF

## 2023-03-07 RX ORDER — METOPROLOL TARTRATE 25 MG/1
25 TABLET, FILM COATED ORAL 2 TIMES DAILY
Status: DISCONTINUED | OUTPATIENT
Start: 2023-03-07 | End: 2023-03-09 | Stop reason: HOSPADM

## 2023-03-07 RX ORDER — DEXTROSE 40 %
30 GEL (GRAM) ORAL
Status: DISCONTINUED | OUTPATIENT
Start: 2023-03-07 | End: 2023-03-09 | Stop reason: HOSPADM

## 2023-03-07 RX ORDER — NICOTINE 14MG/24HR
250 PATCH, TRANSDERMAL 24 HOURS TRANSDERMAL DAILY
COMMUNITY
Start: 2023-02-23

## 2023-03-07 RX ORDER — CEFTRIAXONE 1 G/50ML
INJECTION, SOLUTION INTRAVENOUS
COMMUNITY
Start: 2022-10-14 | End: 2023-03-07

## 2023-03-07 RX ORDER — NITROFURANTOIN 25; 75 MG/1; MG/1
100 CAPSULE ORAL 2 TIMES DAILY
Status: ON HOLD | COMMUNITY
Start: 2022-12-06 | End: 2023-03-09 | Stop reason: HOSPADM

## 2023-03-07 RX ORDER — ERTAPENEM 1 G/1
1 INJECTION, POWDER, LYOPHILIZED, FOR SOLUTION INTRAMUSCULAR; INTRAVENOUS DAILY
COMMUNITY
Start: 2023-03-02 | End: 2023-05-19 | Stop reason: ALTCHOICE

## 2023-03-07 RX ORDER — ENOXAPARIN SODIUM 100 MG/ML
40 INJECTION SUBCUTANEOUS EVERY 24 HOURS
Status: DISCONTINUED | OUTPATIENT
Start: 2023-03-08 | End: 2023-03-09 | Stop reason: HOSPADM

## 2023-03-07 RX ORDER — POLYETHYLENE GLYCOL 3350 17 G/17G
17 POWDER, FOR SOLUTION ORAL DAILY
Status: DISCONTINUED | OUTPATIENT
Start: 2023-03-08 | End: 2023-03-09 | Stop reason: HOSPADM

## 2023-03-07 RX ORDER — PHENAZOPYRIDINE HYDROCHLORIDE 200 MG/1
200 TABLET, FILM COATED ORAL 3 TIMES DAILY PRN
COMMUNITY
Start: 2023-02-23 | End: 2023-09-26 | Stop reason: CLARIF

## 2023-03-07 RX ORDER — ACETAMINOPHEN 325 MG/1
650 TABLET ORAL EVERY 6 HOURS PRN
COMMUNITY
Start: 2023-02-09 | End: 2023-09-26 | Stop reason: CLARIF

## 2023-03-07 RX ORDER — ACETAMINOPHEN 325 MG/1
650 TABLET ORAL EVERY 4 HOURS PRN
Status: DISCONTINUED | OUTPATIENT
Start: 2023-03-07 | End: 2023-03-09 | Stop reason: HOSPADM

## 2023-03-07 RX ORDER — ERTAPENEM 1 G/1
1 INJECTION, POWDER, LYOPHILIZED, FOR SOLUTION INTRAMUSCULAR; INTRAVENOUS DAILY
Status: DISCONTINUED | OUTPATIENT
Start: 2023-03-08 | End: 2023-03-07

## 2023-03-07 RX ORDER — AMITRIPTYLINE HYDROCHLORIDE 10 MG/1
10 TABLET, FILM COATED ORAL
COMMUNITY
Start: 2023-01-27 | End: 2023-03-07

## 2023-03-07 RX ORDER — PHENAZOPYRIDINE HYDROCHLORIDE 200 MG/1
200 TABLET, FILM COATED ORAL
COMMUNITY
Start: 2023-02-23 | End: 2023-03-07

## 2023-03-07 RX ORDER — HYDROMORPHONE HYDROCHLORIDE 1 MG/ML
0.5 INJECTION, SOLUTION INTRAMUSCULAR; INTRAVENOUS; SUBCUTANEOUS ONCE
Status: COMPLETED | OUTPATIENT
Start: 2023-03-08 | End: 2023-03-08

## 2023-03-07 RX ORDER — AMITRIPTYLINE HYDROCHLORIDE 10 MG/1
10 TABLET, FILM COATED ORAL
COMMUNITY
Start: 2023-02-22 | End: 2023-03-07

## 2023-03-07 RX ORDER — LOPERAMIDE HYDROCHLORIDE 2 MG/1
2 CAPSULE ORAL 4 TIMES DAILY PRN
COMMUNITY
Start: 2023-02-23 | End: 2023-03-21 | Stop reason: SDUPTHER

## 2023-03-07 RX ORDER — METOPROLOL SUCCINATE 25 MG/1
25 TABLET, EXTENDED RELEASE ORAL
COMMUNITY
Start: 2022-06-21 | End: 2023-03-07

## 2023-03-07 RX ADMIN — ERTAPENEM 1 G: 1 INJECTION, POWDER, LYOPHILIZED, FOR SOLUTION INTRAMUSCULAR; INTRAVENOUS at 04:03

## 2023-03-07 RX ADMIN — ATORVASTATIN CALCIUM 40 MG: 40 TABLET, FILM COATED ORAL at 08:03

## 2023-03-07 RX ADMIN — POTASSIUM CHLORIDE 40 MEQ: 1500 TABLET, EXTENDED RELEASE ORAL at 01:03

## 2023-03-07 RX ADMIN — POTASSIUM BICARBONATE 40 MEQ: 391 TABLET, EFFERVESCENT ORAL at 07:03

## 2023-03-07 RX ADMIN — METOPROLOL TARTRATE 25 MG: 25 TABLET, FILM COATED ORAL at 08:03

## 2023-03-07 RX ADMIN — OXYCODONE HYDROCHLORIDE 15 MG: 5 TABLET ORAL at 07:03

## 2023-03-07 RX ADMIN — OXYCODONE HYDROCHLORIDE 10 MG: 5 TABLET ORAL at 01:03

## 2023-03-07 NOTE — ED PROVIDER NOTES
Encounter Date: 3/7/2023       History     Chief Complaint   Patient presents with    Fall     Pt states she fell walking to restroom, pt complains of right hand swelling and pain, pt states she hit head on wall, denies LOC, denies use of blood thinners      Patito Domingo is a 72 y.o. female who  has a past medical history of Allergy, Arthritis, Cataract, Chronic diastolic (congestive) heart failure, Colon cancer, Encounter for blood transfusion, History of ESBL E. 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    The patient presents to the ED due to fall.  Patient states that she was walking to the restroom with her walker and felt dizzy and fell down.  She hit her head pain to her right hand arm her head.  She states she no longer feel dizzy but does feel very weak.  Son at bedside reports that has had a couple of falls.  She has a PICC line in place due to urine infection.  She also has a history of metastatic carcinoid  cancer and is allergic to contrast. She is here with her son.     The history is provided by the patient and a relative.   Review of patient's allergies indicates:   Allergen Reactions    Contrast media Hives, Itching and Swelling    Epinephrine Anaphylaxis     Can cause  a Carcinoid Crisis    Ibuprofen Hives, Itching and Swelling    Zofran [ondansetron hcl] Itching     And multiple other reactions    Iodinated contrast media     Morphine Other (See Comments)    Sulfa (sulfonamide antibiotics) Hives, Itching and Swelling     Past Medical History:   Diagnosis Date    Allergy     Arthritis     Cataract     Chronic diastolic (congestive) heart failure     Colon cancer     Encounter for blood transfusion     History of ESBL E. 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  Service: Endoscopy;  Laterality: N/A;    ERCP N/A 3/4/2022    Procedure: ERCP (ENDOSCOPIC RETROGRADE CHOLANGIOPANCREATOGRAPHY);  Surgeon: Roby Virgen MD;  Location: [facility] ENDO ([address] [place] [address]);  Service: Endoscopy;  Laterality: N/A;    EYE SURGERY      HYSTERECTOMY  1996    LITHOTRIPSY      LIVER BIOPSY      carcinoid    URETEROSCOPY Right 10/10/2019    Procedure: URETEROSCOPY;  Surgeon: Gen Isbell MD;  Location: [facility] OR;  Service: Urology;  Laterality: Right;    UTERINE FIBROID SURGERY       Family History   Problem Relation Age of Onset    Cancer Mother         unknown    Alzheimer's disease Father     Stroke Sister     No Known Problems Son     No Known Problems Son     No Known Problems Son     No Known Problems Son     Kidney disease Neg Hx      Social History     Tobacco Use    Smoking status: Never    Smokeless tobacco: Never   Substance Use Topics    Alcohol use: No     Alcohol/week: 0.0 standard drinks    Drug use: No     Review of Systems   Constitutional:  Positive for fatigue. Negative for fever.   HENT:  Negative for sore throat.    Respiratory:  Negative for shortness of breath.    Cardiovascular:  Negative for chest pain.        R  arm swelling   Gastrointestinal:  Negative for nausea.   Genitourinary:  Negative for dysuria.   Musculoskeletal:  Positive for arthralgias and neck pain. Negative for back pain.   Skin:  Negative for rash.   Neurological:  Positive for dizziness and weakness.   Hematological:  Does not bruise/bleed easily.     Physical Exam     Initial Vitals [03/07/23 0936]   BP Pulse Resp Temp SpO2   127/61 70 18 97.5 °F (36.4 °C) 97 %      MAP       --         Physical Exam    Constitutional:   Chronically ill, nontoxic appearing   HENT:   Head: Normocephalic and atraumatic.   Eyes: EOM are normal. Pupils are equal, round, and reactive to light.   Cardiovascular:  Normal rate, regular rhythm and intact distal pulses.           No murmur heard.  Pulmonary/Chest: No respiratory distress. She has no wheezes. She has no rales.   Abdominal: She exhibits no distension. There is no abdominal tenderness. There is no rebound.   Musculoskeletal:         General: No edema.      Comments:   Plan insertion site appears clean dry and intact, diffuse swelling  No apparent tenderness to palpation     Neurological: She has normal strength. She displays normal reflexes. No cranial nerve deficit or sensory deficit.   Ambulatory with walker   Skin: Skin is warm and dry. Capillary refill takes less than 2 seconds. No rash noted.   Psychiatric: She has a normal mood and affect.       ED Course   Procedures  Labs Reviewed   CBC W/ AUTO DIFFERENTIAL - Abnormal; Notable for the following components:       Result Value    RBC 2.69 (*)     Hemoglobin 8.0 (*)     Hematocrit 26.2 (*)     MCHC 30.5 (*)     RDW 18.6 (*)     All other components within normal limits   COMPREHENSIVE METABOLIC PANEL - Abnormal; Notable for the following components:    Potassium 3.0 (*)     Albumin 2.8 (*)     All other components within normal limits   URINALYSIS, REFLEX TO URINE CULTURE - Abnormal; Notable for the following components:    Protein, UA Trace (*)     Occult Blood  UA 2+ (*)     Urobilinogen, UA 2.0-3.0 (*)     Leukocytes, UA 1+ (*)     All other components within normal limits    Narrative:     Specimen Source->Urine   URINALYSIS MICROSCOPIC - Abnormal; Notable for the following components:    RBC, UA 16 (*)     WBC, UA 23 (*)     Yeast, UA Moderate (*)     All other components within normal limits    Narrative:     Specimen Source->Urine   CULTURE, BLOOD   CULTURE, BLOOD   CULTURE, URINE   TROPONIN I   POCT GLUCOSE MONITORING CONTINUOUS        ECG Results              EKG 12-lead (In process)  Result time 03/07/23 13:01:55      In process by Interface, Lab In [facility] (03/07/23 13:01:55)                   Narrative:    Test Reason : R53.83,    Vent. Rate : 070 BPM     Atrial Rate : 070 BPM     P-R Int : 168 ms          QRS Dur : 092 ms      QT Int : 392 ms       P-R-T Axes : 057 038 030 degrees     QTc Int : 423 ms    Normal sinus rhythm  Septal infarct ,age undetermined  Abnormal ECG  When compared with ECG of 02-JUL-2022 19:20,  Septal infarct is now Present  ST no longer depressed in Inferior leads  Nonspecific T wave abnormality, worse in Inferior leads    Referred By: [email or web address]   SELF           Confirmed By:                                   Imaging Results              NM Lung Scan Ventilation Perfusion (Final result)  Result time 03/07/23 16:12:45      Final result by Balwinder Martinez IV, MD (03/07/23 16:12:45)                   Impression:      Findings represent low probability of pulmonary embolism.      Electronically signed by: Balwinder Martinez  Date:    03/07/2023  Time:    16:12               Narrative:    EXAMINATION:  NM LUNG VENTILATION AND PERFUSION IMAGING    CLINICAL HISTORY:  Pulmonary embolism (PE) suspected, high prob;    TECHNIQUE:  Following inhalation of 12.0 mCi Xe-133 gas via delivery system and IV administration of 5.5 mCi of Tc-99m-MAA, multiple images of the thorax were obtained in various projections.    COMPARISON:  Chest radiograph from  earlier today.    FINDINGS:  Adequate perfusion throughout both lungs.  No focal perfusion defect.    Adequate ventilation throughout both lungs.  No evidence of air trapping.  No focal defect.                                       US Upper Extremity Veins Right (Final result)  Result time 03/07/23 13:48:02   Procedure changed from US Lower Extremity Veins Right     Final result by Galdino Leon MD (03/07/23 13:48:02)                   Impression:      No thrombus in central veins of the right upper extremity.      Electronically signed by: Galdino Leon  Date:    03/07/2023  Time:    13:48               Narrative:    EXAMINATION:  US UPPER EXTREMITY VEINS RIGHT    CLINICAL HISTORY:  arm swelling;    TECHNIQUE:  Duplex and color flow Doppler evaluation and dynamic compression was performed of the right upper extremity veins.    COMPARISON:  None    FINDINGS:  Central veins: The internal jugular, subclavian, and axillary veins are patent and free of thrombus.    Arm veins: The brachial, basilic, and cephalic veins are patent and compressible.    Contralateral subclavian/internal jugular veins: The left subclavian and internal jugular veins are patent and free of thrombus.    Other findings: Right PICC in place.                                       CT Cervical Spine Without Contrast (Final result)  Result time 03/07/23 11:09:13      Final result by Galdino Leon MD (03/07/23 11:09:13)                   Impression:      1. No acute intracranial findings.  2. No acute cervical spine fracture.  3. Degenerative findings the cervical spine, as discussed.      Electronically signed by: Galdino Leon  Date:    03/07/2023  Time:    11:09               Narrative:    EXAMINATION:  CT HEAD WITHOUT CONTRAST; CT CERVICAL SPINE WITHOUT CONTRAST    CLINICAL HISTORY:  Head trauma, minor (Age >= 65y);; Neck trauma (Age >= 65y);    TECHNIQUE:  Low dose axial images were obtained through the head and cervical spine.   Coronal and sagittal reformations were also performed. Contrast was not administered.    COMPARISON:  CT head 01/08/2022. CT cervical spine 07/03/2021.    FINDINGS:  Head:    Blood: No acute intracranial hemorrhage.    Parenchyma: No definite loss of gray-white differentiation to suggest acute or subacute transcortical infarct. Parenchymal volume loss.  Nonspecific areas of white matter hypoattenuation may relate to sequela of chronic small vessel ischemic disease.  Remote infarct left rose marie josey.    Ventricles/Extra-axial spaces: No abnormal extra-axial fluid collection. Basal cisterns are patent.    Vessels: Atherosclerotic calcifications.    Orbits: Status post bilateral lens replacements.    Scalp: Unremarkable.    Skull: There are no depressed skull fractures or destructive bone lesions.    Sinuses and mastoids: Scattered paranasal sinus mucosal thickening with retention cysts.    Other findings: None    Cervical spine:    Fractures: No acute fractures    Alignment: There is no significant vertebral subluxation  Atlanto-axial and atlanto-occipital joints: Atlanto-axial and atlanto-occipital intervals are not widened.  Facet joints: There is no traumatic facet joint widening.  Vertebral bodies: Minor degenerative endplate change.  Discs: Degenerative disc disease.  Spinal canal and foraminal narrowing: Although CT does not optimally evaluate the soft tissue contents of the spinal canal and foramina, no critical stenosis is suggested.    At C2-3, mild central spinal canal stenosis.    At C3-4, mild central spinal canal stenosis    At C4-5, moderate to severe central spinal canal stenosis and mild right and moderate left foraminal narrowing    At C5-6, moderate central spinal canal stenosis and mild bilateral foraminal narrowing    At C6-7, mild to moderate central spinal canal stenosis.      Paraspinal soft tissues: Heterogeneity of the thyroid gland again appreciated.  Partially imaged right-sided central venous  catheter.    Upper Lungs:Biapical pleuroparenchymal scarring.                                       CT Head Without Contrast (Final result)  Result time 03/07/23 11:09:13      Final result by Galdino Leon MD (03/07/23 11:09:13)                   Impression:      1. No acute intracranial findings.  2. No acute cervical spine fracture.  3. Degenerative findings the cervical spine, as discussed.      Electronically signed by: Galdino Leon  Date:    03/07/2023  Time:    11:09               Narrative:    EXAMINATION:  CT HEAD WITHOUT CONTRAST; CT CERVICAL SPINE WITHOUT CONTRAST    CLINICAL HISTORY:  Head trauma, minor (Age >= 65y);; Neck trauma (Age >= 65y);    TECHNIQUE:  Low dose axial images were obtained through the head and cervical spine.  Coronal and sagittal reformations were also performed. Contrast was not administered.    COMPARISON:  CT head 01/08/2022. CT cervical spine 07/03/2021.    FINDINGS:  Head:    Blood: No acute intracranial hemorrhage.    Parenchyma: No definite loss of gray-white differentiation to suggest acute or subacute transcortical infarct. Parenchymal volume loss.  Nonspecific areas of white matter hypoattenuation may relate to sequela of chronic small vessel ischemic disease.  Remote infarct left rose marie josey.    Ventricles/Extra-axial spaces: No abnormal extra-axial fluid collection. Basal cisterns are patent.    Vessels: Atherosclerotic calcifications.    Orbits: Status post bilateral lens replacements.    Scalp: Unremarkable.    Skull: There are no depressed skull fractures or destructive bone lesions.    Sinuses and mastoids: Scattered paranasal sinus mucosal thickening with retention cysts.    Other findings: None    Cervical spine:    Fractures: No acute fractures    Alignment: There is no significant vertebral subluxation  Atlanto-axial and atlanto-occipital joints: Atlanto-axial and atlanto-occipital intervals are not widened.  Facet joints: There is no traumatic facet joint  widening.  Vertebral bodies: Minor degenerative endplate change.  Discs: Degenerative disc disease.  Spinal canal and foraminal narrowing: Although CT does not optimally evaluate the soft tissue contents of the spinal canal and foramina, no critical stenosis is suggested.    At C2-3, mild central spinal canal stenosis.    At C3-4, mild central spinal canal stenosis    At C4-5, moderate to severe central spinal canal stenosis and mild right and moderate left foraminal narrowing    At C5-6, moderate central spinal canal stenosis and mild bilateral foraminal narrowing    At C6-7, mild to moderate central spinal canal stenosis.      Paraspinal soft tissues: Heterogeneity of the thyroid gland again appreciated.  Partially imaged right-sided central venous catheter.    Upper Lungs:Biapical pleuroparenchymal scarring.                                       X-Ray Chest AP Portable (Final result)  Result time 03/07/23 11:30:53      Final result by Galdino Leon MD (03/07/23 11:30:53)                   Impression:      No convincing evidence of acute cardiopulmonary disease.      Electronically signed by: Galdino Leon  Date:    03/07/2023  Time:    11:30               Narrative:    EXAMINATION:  XR CHEST AP PORTABLE    CLINICAL HISTORY:  PICC;    TECHNIQUE:  Single frontal view of the chest was performed.    COMPARISON:  Chest radiograph performed 07/09/2022.    FINDINGS:  Partially imaged right PICC.    Cardiomediastinal contours grossly within normal limits, allowing for differences in patient positioning rotation.    Lungs grossly clear.    No definite pneumothorax or large volume pleural effusion.    No acute findings the visualized abdomen.    Osseous and soft tissue structures appear without definite acute abnormality.                                       X-Ray Wrist Complete Right (Final result)  Result time 03/07/23 11:29:28      Final result by Galdino Leon MD (03/07/23 11:29:28)                    Impression:      No convincing evidence of acute fracture or dislocation.      Electronically signed by: Galdino Leon  Date:    03/07/2023  Time:    11:29               Narrative:    EXAMINATION:  XR HAND COMPLETE 3 VIEW RIGHT; XR WRIST COMPLETE 3 VIEWS RIGHT    CLINICAL HISTORY:  fall; Unspecified fall, initial encounter    TECHNIQUE:  PA, lateral, and oblique views of the right hand were performed.    Three views right wrist.    COMPARISON:  None    FINDINGS:  Query osseous demineralization.    No convincing evidence of acute fracture or dislocation.  Joint spaces appear maintained.  Degenerative changes appear most advanced at the radiocarpal joint, and thumb CMC and MCP joints.    No definite radiopaque foreign body.                                       X-Ray Hand 3 view Right (Final result)  Result time 03/07/23 11:29:28      Final result by Galdino Leon MD (03/07/23 11:29:28)                   Impression:      No convincing evidence of acute fracture or dislocation.      Electronically signed by: Galdino Leon  Date:    03/07/2023  Time:    11:29               Narrative:    EXAMINATION:  XR HAND COMPLETE 3 VIEW RIGHT; XR WRIST COMPLETE 3 VIEWS RIGHT    CLINICAL HISTORY:  fall; Unspecified fall, initial encounter    TECHNIQUE:  PA, lateral, and oblique views of the right hand were performed.    Three views right wrist.    COMPARISON:  None    FINDINGS:  Query osseous demineralization.    No convincing evidence of acute fracture or dislocation.  Joint spaces appear maintained.  Degenerative changes appear most advanced at the radiocarpal joint, and thumb CMC and MCP joints.    No definite radiopaque foreign body.                                    X-Rays:   Other Radiology Reports: US of right upper extremity negative for DVT   Medications   ertapenem (INVANZ) 1 g in sodium chloride 0.9 % 100 mL IVPB (MB+) (1 g Intravenous New Bag 3/7/23 1618)   potassium chloride SA CR tablet 40 mEq (40 mEq Oral Given  3/7/23 1303)   oxyCODONE immediate release tablet 10 mg (10 mg Oral Given 3/7/23 1302)     Medical Decision Making:   History:   Old Records Summarized: records from previous admission(s).       <> Summary of Records: Patient admitted to SNF 2/10/23      Pt discharged for recurrent UTI in setting of bilateral staghorn calculi 2/2/23. Pt discharged on ertapenem IV for 6 weeks via PICC, urology follow up, pyridium for dysuria.     Since discharge she has had generalized weakness and difficulties w mobility, 3 episodes of falling since discharge, one episode of hitting her head, but denies LOC or prodromal symptoms prior to fall.      In the ED, vitals and labs stable.    Hospital Course:     During acute and SNF admission, ID consulted for abx management which was continued with merrem. PT/OT consulted and signed off due to high functioning several times. Pt complained of low back pain, left hip and pelvic pain which was evaluated with CT, and XR which were negative. Pt discharged to follow up with PCP and Urology.    Notes for Follow-Up:   Ertapenem for total of 6 weeks  Needs follow up with urologist Dr. Gayle    Differential Diagnosis:   Differential Diagnosis includes, but is not limited to:  Arrhythmia, aortic dissection, MI/unstable angina, PE, cardiogenic shock, CHF, CVA/TIA, intracranial lesion/mass, seizure, perforated viscous, ruptured AAA, orthostatic hypotension, vasovagal episode, anemia, dehydration, medication reaction, intentional overdose    Clinical Tests:   Radiological Study: Reviewed  ED Management:  Patient on further questioning reports several near syncopal episodes with falls over the past week she states she ismore comfortable on reassessment.  Given patient's symptoms and comorbidities I feel patient would benefit from further observation/evaluation of her symptoms.  She will be admitted to [facility] for further care           ED Course as of 03/07/23 1643   Tue Mar 07, 2023   1051  X-Ray Hand 3 view Right  X-Ray Reviewed. Negative for acute fracture or dislocation by my independent interpretation, awaiting formal radiology read.   [RN]   1055 X-Ray Wrist Complete Right  X-Ray Reviewed. Negative for acute fracture or dislocation by my independent interpretation, awaiting formal radiology read.   [RN]   1055 X-Ray Chest AP Portable  Chest X ray reviewed - negative for cardiomegaly, infiltrate or pneumothorax by my independent interpretation. PICC line appears in appropriate position Awaiting formal radiology read.     [RN]   1113 CT Head Without Contrast  No acute findings  [RN]   1113 CT Cervical Spine Without Contrast  No acute findings [RN]   1139 CBC auto differential(!)  Hemoglobin 8.0 improved from previous  [RN]   1139 Comprehensive metabolic panel(!)  Hypokalemia/ hypoalbuminemia otherwise within normal limits [RN]   1139 Troponin I  No significant abnormality.    [RN]   1642 Urinalysis Microscopic(!)  Consistent with infection.  Patient is currently to take ertapenem via PICC line for ESBL [RN]   1643 Urinalysis, Reflex to Urine Culture Urine, Clean Catch(!)  Consistent with infection [RN]      ED Course User Index  [RN] Ministerio Cruz Jr., MD                   Clinical Impression:   Final diagnoses:  [R53.83] Fatigue  [W19.XXXA] Fall  [M79.89] Arm swelling  [R55] Near syncope        ED Disposition Condition    Observation Stable               Portions of this note were dictated using voice recognition software and may contain dictation related errors in spelling/grammar/syntax not found on text review       Ministerio Cruz Jr., MD  03/07/23 6000

## 2023-03-07 NOTE — FIRST PROVIDER EVALUATION
Emergency Department TeleTriage Encounter Note      CHIEF COMPLAINT    Chief Complaint   Patient presents with    Fall     Pt states she fell walking to restroom, pt complains of right hand swelling and pain, pt states she hit head on wall, denies LOC, denies use of blood thinners        VITAL SIGNS   Initial Vitals [03/07/23 0936]   BP Pulse Resp Temp SpO2   127/61 70 18 97.5 °F (36.4 °C) 97 %      MAP       --            ALLERGIES    Review of patient's allergies indicates:   Allergen Reactions    Contrast media Hives, Itching and Swelling    Epinephrine Anaphylaxis     Can cause  a Carcinoid Crisis    Ibuprofen Hives, Itching and Swelling    Zofran [ondansetron hcl] Itching     And multiple other reactions    Iodinated contrast media     Morphine Other (See Comments)    Sulfa (sulfonamide antibiotics) Hives, Itching and Swelling       PROVIDER TRIAGE NOTE    This is a teletriage evaluation of a 72 y.o. female presenting to the ED complaining of fall. Pt states that she was walking to the restroom this morning when she became weak which caused her to fall to the floor. She did hit her head on a wall. Denies LOC and anticoagulant use.  Reports that she started feeling weak yesterday and family reports that she was disoriented.  Pt reports RUE pain, especially in hand and wrist since falling. Has had RUE swelling today. Pt has PICC in RUE. Currently on abx for UTI. Denies fever. Has had diarrhea. Denies CP and SOB.     Pt appears ill but nontoxic.  Possible PICC line infiltration.  Will obtain labs, CT head, CT c-spine. CXR for picc placement check. .     Initial orders will be placed and care will be transferred to an alternate provider when patient is roomed for a full evaluation. Any additional orders and the final disposition will be determined by that provider.         ORDERS  Labs Reviewed   CBC W/ AUTO DIFFERENTIAL   COMPREHENSIVE METABOLIC PANEL   TROPONIN I   URINALYSIS, REFLEX TO URINE CULTURE   POCT  GLUCOSE MONITORING CONTINUOUS       ED Orders (720h ago, onward)      Start Ordered     Status Ordering Provider    03/07/23 0947 03/07/23 0947  X-Ray Wrist Complete Right  1 time imaging         Ordered ALLYSON ELLINGTON N.    03/07/23 0947 03/07/23 0947  X-Ray Hand 3 view Right  1 time imaging         Ordered ALLYSON ELLINGTON N.    03/07/23 0946 03/07/23 0946  CT Cervical Spine Without Contrast  1 time imaging         Ordered ALLYSON ELLINGTON N.    03/07/23 0946 03/07/23 0946  X-Ray Chest AP Portable  1 time imaging        Comments: RUE swelling. Please verify placement.    Ordered ALLYSON ELLINGTON N.    03/07/23 0945 03/07/23 0946  CBC auto differential  STAT         Ordered ALLYSON ELLINGTON N.    03/07/23 0945 03/07/23 0946  Comprehensive metabolic panel  STAT         Ordered ALLYSON ELLINGTON N.    03/07/23 0945 03/07/23 0946  Troponin I  STAT         Ordered ALLYSON ELLINGTON N.    03/07/23 0945 03/07/23 0946  Urinalysis, Reflex to Urine Culture Urine, Clean Catch  STAT         Ordered ALLYSON ELLINGTON N.    03/07/23 0945 03/07/23 0946  Insert Saline lock IV  Once         Ordered ALLYSON ELLINGTON N.    03/07/23 0945 03/07/23 0946  EKG 12-lead  Once         Ordered ALLYSON ELLINGTON N.    03/07/23 0945 03/07/23 0946  Cardiac Monitoring - Adult  Continuous        Comments: Notify Physician If:    Ordered LALYSON ELLINGTON N.    03/07/23 0945 03/07/23 0946  POCT glucose  Once         Ordered ALLYSON ELLINGTON N.    03/07/23 0945 03/07/23 0946  CT Head Without Contrast  1 time imaging         Ordered ALLYSON ELLINGTON              Virtual Visit Note: The provider triage portion of this emergency department evaluation and documentation was performed via [employer], a HIPAA-compliant telemedicine application, in concert with a tele-presenter in the room. A face to face patient evaluation with one of my colleagues will occur once the patient  is placed in an emergency department room.      DISCLAIMER: This note was prepared with [employer] voice recognition transcription software. Garbled syntax, mangled pronouns, and other bizarre constructions may be attributed to that software system.

## 2023-03-07 NOTE — PHARMACY MED REC
"Admission Medication History     The home medication history was taken by Simran Ren CPhT.    Medication history obtained from, Patient Verified    You may go to "Admission" then "Reconcile Home Medications" tabs to review and/or act upon these items.     The home medication list has been updated by the Pharmacy department.   Please read ALL comments highlighted in yellow.   Please address this information as you see fit.    Feel free to contact us if you have any questions or require assistance.      The medications listed below were removed from the home medication list.  Please reorder if appropriate:  Patient reports no longer taking the following medication(s):  Amitriptyline 10 mg  Augmentin 875-125 mg  Baclofen 10 mg  Ceftriaxone 1 gram IVPB  Dicyclomine 20 mg  Gabapentin 300 mg  Magnesium Oxide 400 mg  Hiprex 1 gram  Methocarbamol 750 mg  Miconazole 2% powder  Minocycline 100   Multivitamin with folic acid 400 mcg  Narcan 4 mg spray  Mycolog II cream  Glycolax 17 gram  Pericolace 8.6-50 mg      Simran Ren CPhT.  Ext [phone]               .        "

## 2023-03-07 NOTE — TELEPHONE ENCOUNTER
Called pt to see if she was coming to her 8:30 appointment, spoke with her son mr. Salcedo and he stated that she fell twice and has been really weak he was brining her to the Emergency room in [place] to get the proper care. Informed pt that I would cancel her appointment today and told him that he was doing the right thing by brining her into the emergency room.

## 2023-03-07 NOTE — HPI
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 She has been having recurrent near syncopal episodes and falls since recent discharge from a skilled nursing facility. On chart review, [place] [facility] [place] and discharged on 2/23/23 to SNF and subsequent home. She was deemed too high risk to intervene on the staghorn calculi per patient. Currently lives at home alone and states needs more help. Reports having watery diarrhea, 3-4 non bloody stools per day since starting antibiotics. Also reports abdominal discomfort and poor po intake. Denies N/V/ fever.     Work up in the ER unremarkable except for a low potassium which was replaced. VQ scan showed low probability of PE. RUE US negative for DVT. No leukocytosis, lactic acidosis. VS stable, afebrile. She received her dose of ertapenem.   Patient is a poor historian. No family at bedside.

## 2023-03-07 NOTE — SUBJECTIVE & OBJECTIVE
Past Medical History:   Diagnosis Date    Allergy     Arthritis     Cataract     Chronic diastolic (congestive) heart failure     Colon cancer     Encounter for blood transfusion     History of ESBL E. 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)        Past Surgical History:   Procedure Laterality Date    ABDOMINAL SURGERY      CATARACT EXTRACTION Left 10/2017     SECTION      CHOLECYSTECTOMY      COLON SURGERY      cystoscope      CYSTOSCOPY W/ RETROGRADES Right 10/10/2019    Procedure: CYSTOSCOPY, WITH RETROGRADE PYELOGRAM;  Surgeon: Gen Isbell MD;  Location: [facility];  Service: Urology;  Laterality: Right;    ERCP N/A 2021    Procedure: ERCP (ENDOSCOPIC RETROGRADE CHOLANGIOPANCREATOGRAPHY);  Surgeon: Jayjay Rivera MD;  [facility]  Service: Endoscopy;  Laterality: N/A;    ERCP N/A 3/4/2022    Procedure: ERCP (ENDOSCOPIC RETROGRADE CHOLANGIOPANCREATOGRAPHY);  Surgeon: Roby Virgen MD;  [facility]  Service: Endoscopy;  Laterality: N/A;    EYE SURGERY      HYSTERECTOMY  1996    LITHOTRIPSY      LIVER BIOPSY      carcinoid    URETEROSCOPY Right 10/10/2019    Procedure: URETEROSCOPY;  Surgeon: Gen Isbell MD;  Location: [facility] OR;  Service: Urology;  Laterality: Right;    UTERINE FIBROID SURGERY         Review of patient's allergies indicates:   Allergen Reactions    Contrast media Hives, Itching and Swelling    Epinephrine Anaphylaxis     Can cause  a Carcinoid Crisis    Ibuprofen Hives, Itching and Swelling    Zofran [ondansetron hcl] Itching     And multiple other reactions    Iodinated contrast media     Morphine Other (See Comments)    Sulfa (sulfonamide antibiotics) Hives, Itching and Swelling        No current facility-administered medications on file prior to encounter.     Current Outpatient Medications on File Prior to Encounter   Medication Sig    acetaminophen (TYLENOL) 325 MG tablet Take 650 mg by mouth every 6 (six) hours as needed.    amLODIPine (NORVASC) 5 MG tablet Take 5 mg by mouth once daily.    atorvastatin (LIPITOR) 40 MG tablet Take 1 tablet (40 mg total) by mouth every evening.    cyanocobalamin (VITAMIN B-12) 1000 MCG tablet Take 1 tablet (1,000 mcg total) by mouth once daily.    DAILY PROBIOTIC, S. BOULARDII, 250 mg capsule Take 250 mg by mouth once daily.    diclofenac sodium (VOLTAREN) 1 % Gel Apply 2 g topically 2 (two) times daily as needed.    ertapenem (INVANZ) 1 gram injection Inject 1 g into the vein once daily.    LIDOcaine (LIDODERM) 5 % Place 1 patch onto the skin once daily. Remove & Discard patch within 12 hours or as directed by MD    loperamide (IMODIUM) 2 mg capsule Take 2 mg by mouth 4 (four) times daily as needed.    losartan (COZAAR) 100 MG tablet Take 0.5 tablets (50 mg total) by mouth once daily. (Patient taking differently: Take 100 mg by mouth once daily.)    methyl salicylate-menthol 15-10% 15-10 % Crea Apply topically 3 (three) times daily. (Patient taking differently: Apply topically 3 (three) times daily as needed.)    metoprolol tartrate (LOPRESSOR) 25 MG tablet Take 25 mg by mouth 2 (two) times daily.    nitrofurantoin, macrocrystal-monohydrate, (MACROBID) 100 MG capsule Take 100 mg by mouth 2 (two) times daily.    oxyCODONE (ROXICODONE) 15 MG Tab Take 15 mg by mouth 4 (four) times daily as needed.    phenazopyridine (PYRIDIUM) 200 MG tablet Take 200 mg by mouth 3 (three) times daily as needed.    trolamine salicylate (ASPERCREME) 10 % cream Apply topically as needed.    [DISCONTINUED] amitriptyline (ELAVIL) 10 MG tablet Take 10 mg by mouth.    [DISCONTINUED] metoprolol succinate (TOPROL-XL) 25 MG 24 hr tablet Take 25 mg by mouth.    [DISCONTINUED]  minocycline (DYNACIN) 100 MG tablet Take 100 mg by mouth.    [DISCONTINUED] multivitamin with folic acid 400 mcg Tab Take 1 tablet by mouth.    [DISCONTINUED] phenazopyridine (PYRIDIUM) 200 MG tablet Take 200 mg by mouth.    0.9 % sodium chloride (SODIUM CHLORIDE 0.9%) solution Inject into the vein once daily.    BINAXNOW COVID-19 AG SELF TEST Kit TEST AS DIRECTED TODAY    meclizine (ANTIVERT) 25 mg tablet Take 1 tablet (25 mg total) by mouth 3 (three) times daily as needed for Dizziness.    pantoprazole (PROTONIX) 40 MG tablet TAKE 1 TABLET(40 MG) BY MOUTH EVERY DAY (Patient taking differently: Take 40 mg by mouth 2 (two) times daily as needed.)    [DISCONTINUED] amitriptyline (ELAVIL) 10 MG tablet Take 10 mg by mouth.    [DISCONTINUED] amLODIPine (NORVASC) 10 MG tablet Take 1 tablet (10 mg total) by mouth once daily.    [DISCONTINUED] amoxicillin-clavulanate 875-125mg (AUGMENTIN) 875-125 mg per tablet Take 1 tablet by mouth 2 (two) times daily. (Patient not taking: Reported on 2/3/2023)    [DISCONTINUED] baclofen (LIORESAL) 10 MG tablet TAKE 1 TABLET BY MOUTH 2 TIMES DAILY AS NEEDED FOR MUSCLE CRAMPS (Patient not taking: Reported on 2/3/2023)    [DISCONTINUED] cefTRIAXone (ROCEPHIN) 1 gram/50 mL IVPB     [DISCONTINUED] dicyclomine (BENTYL) 20 mg tablet TAKE 1 TABLET(20 MG) BY MOUTH THREE TIMES DAILY AS NEEDED FOR ABDOMINAL PAIN    [DISCONTINUED] gabapentin (NEURONTIN) 300 MG capsule Take 1 capsule (300 mg total) by mouth every evening. (Patient not taking: Reported on 2/3/2023)    [DISCONTINUED] magnesium oxide (MAG-OX) 400 mg (241.3 mg magnesium) tablet Take 1 tablet (400 mg total) by mouth 2 (two) times daily. (Patient not taking: Reported on 2/3/2023)    [DISCONTINUED] methenamine (HIPREX) 1 gram Tab Take 1 tablet (1 g total) by mouth 2 (two) times daily. (Patient not taking: Reported on 2/3/2023)    [DISCONTINUED] methocarbamoL (ROBAXIN) 750 MG Tab TAKE 2 TABLETS BY MOUTH FOUR TIMES DAILY FOR 10 DAYS     [DISCONTINUED] metoprolol succinate (TOPROL-XL) 25 MG 24 hr tablet Take 1 tablet (25 mg total) by mouth 2 (two) times daily.    [DISCONTINUED] miconazole NITRATE 2 % (MICOTIN) 2 % top powder Apply topically 2 (two) times daily. for 10 days    [DISCONTINUED] minocycline (MINOCIN,DYNACIN) 100 MG capsule Take 100 mg by mouth 2 (two) times daily.    [DISCONTINUED] naloxone (NARCAN) 4 mg/actuation Spry SMARTSIG:Both Nares    [DISCONTINUED] nystatin-triamcinolone (MYCOLOG II) cream Apply topically 2 (two) times daily. Apply to vaginal introitus area. (Patient not taking: Reported on 2/28/2023)    [DISCONTINUED] oxyCODONE (ROXICODONE) 10 mg Tab immediate release tablet Take 1 tablet (10 mg total) by mouth every 4 (four) hours as needed for Pain. (Patient not taking: Reported on 2/3/2023)    [DISCONTINUED] oxyCODONE (ROXICODONE) 15 MG Tab Take 15 mg by mouth.    [DISCONTINUED] polyethylene glycol (GLYCOLAX) 17 gram PwPk Take 17 g by mouth once daily. (Patient not taking: Reported on 2/28/2023)    [DISCONTINUED] senna-docusate 8.6-50 mg (PERICOLACE) 8.6-50 mg per tablet Take 1 tablet by mouth 2 (two) times daily.     Family History       Problem Relation (Age of Onset)    Alzheimer's disease Father    Cancer Mother    No Known Problems Son, Son, Son, Son    Stroke Sister          Tobacco Use    Smoking status: Never    Smokeless tobacco: Never   Substance and Sexual Activity    Alcohol use: No     Alcohol/week: 0.0 standard drinks    Drug use: No    Sexual activity: Not Currently     Review of Systems   Constitutional:  Positive for fatigue.   Gastrointestinal:  Positive for abdominal pain and diarrhea.   Genitourinary:  Positive for frequency.   Musculoskeletal:  Positive for myalgias.   Neurological:  Positive for light-headedness.   Objective:     Vital Signs (Most Recent):  Temp: 97.5 °F (36.4 °C) (03/07/23 0936)  Pulse: 70 (03/07/23 1114)  Resp: 16 (03/07/23 1302)  BP: 127/61 (03/07/23 0936)  SpO2: 98 % (03/07/23 1114)  Vital Signs (24h Range):  Temp:  [97.5 °F (36.4 °C)] 97.5 °F (36.4 °C)  Pulse:  [70] 70  Resp:  [16-18] 16  SpO2:  [97 %-98 %] 98 %  BP: (127)/(61) 127/61     Weight: 81.6 kg (180 lb)  Body mass index is 29.05 kg/m².    Physical Exam  Vitals and nursing note reviewed.   Constitutional:       General: She is not in acute distress.  HENT:      Head: Normocephalic and atraumatic.   Eyes:      General: No scleral icterus.  Cardiovascular:      Rate and Rhythm: Normal rate and regular rhythm.      Pulses: Normal pulses.      Heart sounds: Normal heart sounds. No murmur heard.  Pulmonary:      Effort: Pulmonary effort is normal. No respiratory distress.      Breath sounds: Normal breath sounds. No wheezing.   Abdominal:      Palpations: Abdomen is soft.      Tenderness: There is abdominal tenderness (generalized mild).   Musculoskeletal:      Right lower leg: No edema.      Left lower leg: No edema.   Skin:     General: Skin is warm and dry.      Findings: No bruising or rash.   Neurological:      Mental Status: She is alert and oriented to person, place, and time.      Comments: Strength in b/l LE limited likely due to pain which is chronic per patient   Psychiatric:         Mood and Affect: Mood normal.           Significant Labs: All pertinent labs within the past 24 hours have been reviewed.    Significant Imaging: I have reviewed all pertinent imaging results/findings within the past 24 hours.

## 2023-03-08 ENCOUNTER — TELEPHONE (OUTPATIENT)
Dept: FAMILY MEDICINE | Facility: CLINIC | Age: 73
End: 2023-03-08
Payer: MEDICARE

## 2023-03-08 LAB
ANION GAP SERPL CALC-SCNC: 9 MMOL/L (ref 8–16)
AV INDEX (PROSTH): 0.86
AV MEAN GRADIENT: 3 MMHG
AV PEAK GRADIENT: 6 MMHG
AV VALVE AREA: 3.04 CM2
AV VELOCITY RATIO: 0.71
BASOPHILS # BLD AUTO: 0.03 K/UL (ref 0–0.2)
BASOPHILS NFR BLD: 0.6 % (ref 0–1.9)
BSA FOR ECHO PROCEDURE: 1.82 M2
BUN SERPL-MCNC: 12 MG/DL (ref 8–23)
C DIFF GDH STL QL: NEGATIVE
C DIFF TOX A+B STL QL IA: NEGATIVE
CALCIUM SERPL-MCNC: 8.5 MG/DL (ref 8.7–10.5)
CHLORIDE SERPL-SCNC: 111 MMOL/L (ref 95–110)
CO2 SERPL-SCNC: 25 MMOL/L (ref 23–29)
CREAT SERPL-MCNC: 0.8 MG/DL (ref 0.5–1.4)
CV ECHO LV RWT: 0.32 CM
DIFFERENTIAL METHOD: ABNORMAL
DOP CALC AO PEAK VEL: 1.25 M/S
DOP CALC AO VTI: 21.9 CM
DOP CALC LVOT AREA: 3.5 CM2
DOP CALC LVOT DIAMETER: 2.12 CM
DOP CALC LVOT PEAK VEL: 0.89 M/S
DOP CALC LVOT STROKE VOLUME: 66.68 CM3
DOP CALC MV VTI: 22.9 CM
DOP CALCLVOT PEAK VEL VTI: 18.9 CM
E WAVE DECELERATION TIME: 174.5 MSEC
E/A RATIO: 0.7
E/E' RATIO: 11.5 M/S
ECHO LV POSTERIOR WALL: 0.76 CM (ref 0.6–1.1)
EJECTION FRACTION: 70 %
EOSINOPHIL # BLD AUTO: 0.3 K/UL (ref 0–0.5)
EOSINOPHIL NFR BLD: 6.6 % (ref 0–8)
ERYTHROCYTE [DISTWIDTH] IN BLOOD BY AUTOMATED COUNT: 18.6 % (ref 11.5–14.5)
EST. GFR  (NO RACE VARIABLE): >60 ML/MIN/1.73 M^2
FRACTIONAL SHORTENING: 34 % (ref 28–44)
GLUCOSE SERPL-MCNC: 115 MG/DL (ref 70–110)
HCT VFR BLD AUTO: 23.5 % (ref 37–48.5)
HGB BLD-MCNC: 7.2 G/DL (ref 12–16)
IMM GRANULOCYTES # BLD AUTO: 0.02 K/UL (ref 0–0.04)
IMM GRANULOCYTES NFR BLD AUTO: 0.4 % (ref 0–0.5)
INTERVENTRICULAR SEPTUM: 0.93 CM (ref 0.6–1.1)
IVC DIAMETER: 11 CM
LA MAJOR: 5.07 CM
LA MINOR: 4.96 CM
LA WIDTH: 3.6 CM
LEFT ATRIUM SIZE: 2.83 CM
LEFT ATRIUM VOLUME INDEX MOD: 20.7 ML/M2
LEFT ATRIUM VOLUME INDEX: 24.1 ML/M2
LEFT ATRIUM VOLUME MOD: 37.23 CM3
LEFT ATRIUM VOLUME: 43.42 CM3
LEFT INTERNAL DIMENSION IN SYSTOLE: 3.18 CM (ref 2.1–4)
LEFT VENTRICLE DIASTOLIC VOLUME INDEX: 59.88 ML/M2
LEFT VENTRICLE DIASTOLIC VOLUME: 107.78 ML
LEFT VENTRICLE MASS INDEX: 76 G/M2
LEFT VENTRICLE SYSTOLIC VOLUME INDEX: 22.4 ML/M2
LEFT VENTRICLE SYSTOLIC VOLUME: 40.32 ML
LEFT VENTRICULAR INTERNAL DIMENSION IN DIASTOLE: 4.81 CM (ref 3.5–6)
LEFT VENTRICULAR MASS: 136.5 G
LV LATERAL E/E' RATIO: 9.86 M/S
LV SEPTAL E/E' RATIO: 13.8 M/S
LVOT MG: 1.73 MMHG
LVOT MV: 0.64 CM/S
LYMPHOCYTES # BLD AUTO: 1.2 K/UL (ref 1–4.8)
LYMPHOCYTES NFR BLD: 24.5 % (ref 18–48)
MCH RBC QN AUTO: 30.3 PG (ref 27–31)
MCHC RBC AUTO-ENTMCNC: 30.6 G/DL (ref 32–36)
MCV RBC AUTO: 99 FL (ref 82–98)
MONOCYTES # BLD AUTO: 0.5 K/UL (ref 0.3–1)
MONOCYTES NFR BLD: 9.2 % (ref 4–15)
MV MEAN GRADIENT: 2 MMHG
MV PEAK A VEL: 0.99 M/S
MV PEAK E VEL: 0.69 M/S
MV PEAK GRADIENT: 4 MMHG
MV STENOSIS PRESSURE HALF TIME: 50.61 MS
MV VALVE AREA BY CONTINUITY EQUATION: 2.91 CM2
MV VALVE AREA P 1/2 METHOD: 4.35 CM2
NEUTROPHILS # BLD AUTO: 3 K/UL (ref 1.8–7.7)
NEUTROPHILS NFR BLD: 58.7 % (ref 38–73)
NRBC BLD-RTO: 0 /100 WBC
PISA MRMAX VEL: 5.11 M/S
PISA TR MAX VEL: 2.56 M/S
PLATELET # BLD AUTO: 196 K/UL (ref 150–450)
PMV BLD AUTO: 9.9 FL (ref 9.2–12.9)
POCT GLUCOSE: 106 MG/DL (ref 70–110)
POTASSIUM SERPL-SCNC: 2.9 MMOL/L (ref 3.5–5.1)
PV PEAK VELOCITY: 0.8 CM/S
RA MAJOR: 4.05 CM
RA PRESSURE: 3 MMHG
RBC # BLD AUTO: 2.38 M/UL (ref 4–5.4)
SINUS: 3 CM
SODIUM SERPL-SCNC: 145 MMOL/L (ref 136–145)
TDI LATERAL: 0.07 M/S
TDI SEPTAL: 0.05 M/S
TDI: 0.06 M/S
TR MAX PG: 26 MMHG
TV REST PULMONARY ARTERY PRESSURE: 29 MMHG
WBC # BLD AUTO: 5.02 K/UL (ref 3.9–12.7)

## 2023-03-08 PROCEDURE — 97530 THERAPEUTIC ACTIVITIES: CPT | Mod: HCNC

## 2023-03-08 PROCEDURE — G0378 HOSPITAL OBSERVATION PER HR: HCPCS | Mod: HCNC

## 2023-03-08 PROCEDURE — 63600175 PHARM REV CODE 636 W HCPCS: Mod: HCNC | Performed by: STUDENT IN AN ORGANIZED HEALTH CARE EDUCATION/TRAINING PROGRAM

## 2023-03-08 PROCEDURE — 96375 TX/PRO/DX INJ NEW DRUG ADDON: CPT

## 2023-03-08 PROCEDURE — 96366 THER/PROPH/DIAG IV INF ADDON: CPT

## 2023-03-08 PROCEDURE — 63600175 PHARM REV CODE 636 W HCPCS: Mod: HCNC | Performed by: INTERNAL MEDICINE

## 2023-03-08 PROCEDURE — 96372 THER/PROPH/DIAG INJ SC/IM: CPT | Performed by: STUDENT IN AN ORGANIZED HEALTH CARE EDUCATION/TRAINING PROGRAM

## 2023-03-08 PROCEDURE — 87449 NOS EACH ORGANISM AG IA: CPT | Mod: HCNC | Performed by: STUDENT IN AN ORGANIZED HEALTH CARE EDUCATION/TRAINING PROGRAM

## 2023-03-08 PROCEDURE — 96367 TX/PROPH/DG ADDL SEQ IV INF: CPT

## 2023-03-08 PROCEDURE — 97162 PT EVAL MOD COMPLEX 30 MIN: CPT | Mod: HCNC

## 2023-03-08 PROCEDURE — 80048 BASIC METABOLIC PNL TOTAL CA: CPT | Mod: HCNC | Performed by: STUDENT IN AN ORGANIZED HEALTH CARE EDUCATION/TRAINING PROGRAM

## 2023-03-08 PROCEDURE — 25000003 PHARM REV CODE 250: Mod: HCNC | Performed by: INTERNAL MEDICINE

## 2023-03-08 PROCEDURE — 85025 COMPLETE CBC W/AUTO DIFF WBC: CPT | Mod: HCNC | Performed by: STUDENT IN AN ORGANIZED HEALTH CARE EDUCATION/TRAINING PROGRAM

## 2023-03-08 PROCEDURE — 97165 OT EVAL LOW COMPLEX 30 MIN: CPT | Mod: HCNC

## 2023-03-08 PROCEDURE — 25000003 PHARM REV CODE 250: Mod: HCNC | Performed by: STUDENT IN AN ORGANIZED HEALTH CARE EDUCATION/TRAINING PROGRAM

## 2023-03-08 RX ORDER — CAPSAICIN 0.75 MG/G
CREAM TOPICAL 3 TIMES DAILY PRN
Status: DISCONTINUED | OUTPATIENT
Start: 2023-03-08 | End: 2023-03-09 | Stop reason: HOSPADM

## 2023-03-08 RX ORDER — LOSARTAN POTASSIUM 50 MG/1
100 TABLET ORAL DAILY
Status: DISCONTINUED | OUTPATIENT
Start: 2023-03-09 | End: 2023-03-09 | Stop reason: HOSPADM

## 2023-03-08 RX ORDER — LOSARTAN POTASSIUM 50 MG/1
50 TABLET ORAL ONCE
Status: COMPLETED | OUTPATIENT
Start: 2023-03-08 | End: 2023-03-08

## 2023-03-08 RX ORDER — MAGNESIUM SULFATE HEPTAHYDRATE 40 MG/ML
2 INJECTION, SOLUTION INTRAVENOUS ONCE
Status: COMPLETED | OUTPATIENT
Start: 2023-03-08 | End: 2023-03-08

## 2023-03-08 RX ADMIN — HYDROMORPHONE HYDROCHLORIDE 0.5 MG: 1 INJECTION, SOLUTION INTRAMUSCULAR; INTRAVENOUS; SUBCUTANEOUS at 12:03

## 2023-03-08 RX ADMIN — OXYCODONE HYDROCHLORIDE 15 MG: 5 TABLET ORAL at 03:03

## 2023-03-08 RX ADMIN — MAGNESIUM SULFATE 2 G: 2 INJECTION INTRAVENOUS at 12:03

## 2023-03-08 RX ADMIN — ENOXAPARIN SODIUM 40 MG: 40 INJECTION SUBCUTANEOUS at 04:03

## 2023-03-08 RX ADMIN — OXYCODONE HYDROCHLORIDE 15 MG: 5 TABLET ORAL at 09:03

## 2023-03-08 RX ADMIN — OXYCODONE HYDROCHLORIDE 15 MG: 5 TABLET ORAL at 04:03

## 2023-03-08 RX ADMIN — POTASSIUM BICARBONATE 40 MEQ: 391 TABLET, EFFERVESCENT ORAL at 03:03

## 2023-03-08 RX ADMIN — METOPROLOL TARTRATE 25 MG: 25 TABLET, FILM COATED ORAL at 09:03

## 2023-03-08 RX ADMIN — METOPROLOL TARTRATE 25 MG: 25 TABLET, FILM COATED ORAL at 08:03

## 2023-03-08 RX ADMIN — OXYCODONE HYDROCHLORIDE 15 MG: 5 TABLET ORAL at 11:03

## 2023-03-08 RX ADMIN — POTASSIUM BICARBONATE 40 MEQ: 391 TABLET, EFFERVESCENT ORAL at 05:03

## 2023-03-08 RX ADMIN — LOSARTAN POTASSIUM 50 MG: 50 TABLET, FILM COATED ORAL at 08:03

## 2023-03-08 RX ADMIN — ATORVASTATIN CALCIUM 40 MG: 40 TABLET, FILM COATED ORAL at 09:03

## 2023-03-08 RX ADMIN — LOSARTAN POTASSIUM 50 MG: 50 TABLET, FILM COATED ORAL at 05:03

## 2023-03-08 RX ADMIN — LABETALOL HYDROCHLORIDE 10 MG: 5 INJECTION INTRAVENOUS at 02:03

## 2023-03-08 RX ADMIN — CAPSAICIN: 0.75 CREAM TOPICAL at 08:03

## 2023-03-08 RX ADMIN — CYANOCOBALAMIN TAB 1000 MCG 1000 MCG: 1000 TAB at 08:03

## 2023-03-08 RX ADMIN — ERTAPENEM 1 G: 1 INJECTION INTRAMUSCULAR; INTRAVENOUS at 10:03

## 2023-03-08 NOTE — PROGRESS NOTES
[facility] ID note    Patient on ertapenem for ESBL UTI with staghorn calculus.  Patient admitted for a fall now.    OK to continue ertapenem until 3/15/23 as planned    Please call if any questions   Ricardo Ramesh  [facility] ID  [phone]

## 2023-03-08 NOTE — PROGRESS NOTES
Updated patient's son Madison at [phone] on patient's plan of care.  All questions answered.  Son visited the patient last night.  Per son, Patient appears to be at baseline mentation.

## 2023-03-08 NOTE — PT/OT/SLP EVAL
"Physical Therapy Evaluation    Patient Name:  Patito Domingo   MRN:  [id number]    Recommendations:     Discharge Recommendations: nursing facility, skilled   Discharge Equipment Recommendations: none   Barriers to discharge: Decreased caregiver support and patient lives alone, history of falls    Assessment:     Patito Domingo is a 72 y.o. female admitted with a medical diagnosis of Near syncope.  She presents with the following impairments/functional limitations: weakness, impaired functional mobility, impaired cardiopulmonary response to activity, impaired endurance, gait instability, pain, impaired balance, impaired self care skills, decreased lower extremity function, edema     Patient seen for physical therapy evaluation on this date, co-evaluation with occupational therapy.  Patient agreeable to therapy, reports being fearful of falling.   Patient performs supine to sit with min assist, sit to supine with mod assist, and sit to stand with min assist to RW.  Patient tolerates taking 3-4 lateral steps with RW and min assist and 3-4 steps forward and back with RW and min assist, slow pace, flexed trunk.  Full report to follow.  Anticipate patient will require continued rehabilitation in a skilled nursing facility setting.  Physical therapy will continue to follow..    Rehab Prognosis: Good; patient would benefit from acute skilled PT services to address these deficits and reach maximum level of function.    Recent Surgery: * No surgery found *      Plan:     During this hospitalization, patient to be seen 3 x/week to address the identified rehab impairments via gait training, therapeutic activities, therapeutic exercises, neuromuscular re-education and progress toward the following goals:    Plan of Care Expires:  04/07/23    Subjective     Chief Complaint: " I am hurting in my right leg"  Patient/Family Comments/goals: to get stronger, reduce falls  Pain/Comfort:  Pain Rating 1: 8/10  Location - Side 1: " Right  Location 1: leg  Pain Addressed 1: Pre-medicate for activity, Reposition, Distraction, Nurse notified  Pain Rating Post-Intervention 1:  (Does not rate)    Patients cultural, spiritual, Alevism conflicts given the current situation: no    Living Environment:  Patient lives alone in a 1st floor apartment, no KESHAWN, T/S with TTB in bathroom.    Prior to admission, patients level of function was modified independent with gait x household distances with rollator.  Patient has an aide 3 days/ week for assist with ADLs and cleaning home.  Sons live nearby.  Patient has had 2 falls in last 3 months.    Equipment used at home: bedside commode, bath bench, grab bar, rollator, walker, rolling, wheelchair.  DME owned (not currently used): none.  Upon discharge, patient will have assistance from unknown - patient has aide 3 days/week, but currently requires increased assist.    Objective:     Communicated with nurse De La Vega prior to session.  Patient found supine with bed alarm, peripheral IV  upon PT entry to room.    General Precautions: Standard, fall  Orthopedic Precautions:N/A   Braces: N/A  Respiratory Status: Room air    Exams:  Cognitive Exam:  Patient is oriented to Person, Place, Time, Situation, and follows commands  Gross Motor Coordination:  Increased time, but WFL  Postural Exam:  Patient presented with the following abnormalities:    -       Rounded shoulders  -       Forward head  -       Kyphosis  Sensation:  Light Touch grossly intact. Does not report numbness or tingling.    Skin Integrity/Edema:      -       Skin integrity: Visible skin intact  -       Edema: Mild B LEs  RLE ROM: WFL except lacking ~ 10 degrees dorsiflexion, stiff throughout  RLE Strength: Deficits: HIp:  3-/5  Knee: 3+/5  Ankle:  3+/5  LLE ROM: WFL  LLE Strength: Deficits: Hip:  3+/5  Knee:  4/5   Ankle:  4/5    Functional Mobility:  Bed Mobility:     Rolling Right: minimum assistance  Scooting: minimum assistance  Supine to Sit:  minimum assistance  Sit to Supine: moderate assistance  Transfers:     Sit to Stand:  minimum assistance with rolling walker  Gait: Patient able to take 3-4 lateral steps with RW and Min assist, and 3-4 steps forward and back with min assist with RW.  Trunk is flexed, slow pace  Balance: Seated:  EOB, no LOB, reports no dizziness     Standing:  requires RW, min assist, unsteady      AM-PAC 6 CLICK MOBILITY  Total Score:16       Treatment & Education:  Patient educated on role of physical therapy and POC.  Discussed continuing rehabilitation in post acute placement, patient seems agreeable.  BP upon sittin/74,no reports of dizziness sitting or standing.  Patient returns back to supine with mod assist, reports dizziness once supine.      Patient left HOB elevated with all lines intact, call button in reach, bed alarm on, and nurse notified.    GOALS:   Multidisciplinary Problems       Physical Therapy Goals          Problem: Physical Therapy    Goal Priority Disciplines Outcome Goal Variances Interventions   Physical Therapy Goal     PT, PT/OT Ongoing, Progressing     Description: Goals to be met by: 2023     Patient will increase functional independence with mobility by performin. Supine to sit with Stand-by Assistance  2. Sit to supine with Stand-by Assistance  3. Rolling to Left and Right with Stand-by Assistance.  5. Sit to stand transfer with Stand-by Assistance  6. Bed to chair transfer with Stand-by Assistance using Rolling Walker  7. Gait  x 100 feet with Contact Guard Assistance using Rolling Walker.                          History:     Past Medical History:   Diagnosis Date    Allergy     Arthritis     Cataract     Chronic diastolic (congestive) heart failure     Colon cancer     Encounter for blood transfusion     History of ESBL E. 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)        Past Surgical History:   Procedure Laterality Date    ABDOMINAL SURGERY      CATARACT EXTRACTION Left 10/2017     SECTION      CHOLECYSTECTOMY      COLON SURGERY      cystoscope      CYSTOSCOPY W/ RETROGRADES Right 10/10/2019    Procedure: CYSTOSCOPY, WITH RETROGRADE PYELOGRAM;  Surgeon: Gen Isbell MD;  Location: [facility];  Service: Urology;  Laterality: Right;    ERCP N/A 2021    Procedure: ERCP (ENDOSCOPIC RETROGRADE CHOLANGIOPANCREATOGRAPHY);  Surgeon: Jayjay Rivera MD;  Location: [facility] ([address] [place] [address]);  Service: Endoscopy;  Laterality: N/A;    ERCP N/A 3/4/2022    Procedure: ERCP (ENDOSCOPIC RETROGRADE CHOLANGIOPANCREATOGRAPHY);  Surgeon: Roby Virgen MD;  Location: [facility] ([address] [place] [address]);  Service: Endoscopy;  Laterality: N/A;    EYE SURGERY      HYSTERECTOMY  1996    LITHOTRIPSY      LIVER BIOPSY      carcinoid    URETEROSCOPY Right 10/10/2019    Procedure: URETEROSCOPY;  Surgeon: Gen Isbell MD;  Location: [facility];  Service: Urology;  Laterality: Right;    UTERINE FIBROID SURGERY         Time Tracking:     PT Received On: 23  PT Start Time: 1037     PT Stop Time: 1100  PT Total Time (min): 23 min Co-evaluation with OT    Billable Minutes: Evaluation 2023

## 2023-03-08 NOTE — TELEPHONE ENCOUNTER
----- Message from Corey Caceres RN sent at 3/8/2023  4:34 PM CST -----  Regarding: hospital follow up  Good evening,    Please assist with scheduling patient a hospital follow up for patient. Pt is discharging tomorrow.      Thank you

## 2023-03-08 NOTE — TELEPHONE ENCOUNTER
Spoke with pt son mr kumar about scheduling a hospital fu for pt. Regarding the message we received. Pt son stated that he didn't know anything about his mother being his charged. Wanted to know why he wasn't notified of this. Told pt son that I don't know I was just following up on the message I received about scheduling a hospital follow up. He asked me to call the pt back Friday to see when she would want to come in for a follow in. I informed him that It would be easier if she called the office back when she was ready to scheduled her hospital follow up. Pt son verbalized understanding and said that he was going to call his mother right when we hung up to find out what was going on.       I will call pt Friday to see about scheduling a hospital fu

## 2023-03-08 NOTE — PT/OT/SLP EVAL
Occupational Therapy   Evaluation    Name: Patito Domingo  MRN: [id number]  Admitting Diagnosis: Near syncope  Recent Surgery: * No surgery found *      Recommendations:     Discharge Recommendations: nursing facility, skilled  Discharge Equipment Recommendations:  none  Barriers to discharge:  Other (Comment) (Pt requires increased level of assistance)    Assessment:     Patito Domingo is a 72 y.o. female with a medical diagnosis of Near syncope.  She presents with Diagnoses of Fatigue, Fall, Arm swelling, Near syncope, and Chest pain were pertinent to this visit. Performance deficits affecting function: weakness, impaired functional mobility, gait instability, impaired endurance, decreased coordination, decreased upper extremity function, decreased lower extremity function, impaired self care skills, impaired balance, decreased ROM, pain, decreased safety awareness.      Pt would benefit from cont OT services in order to maximize functional independence. Recommending SNF upon d/c.  OT eval performed this date with PT, pt agreeable to therapy. Pt with c/o pain to RLE & lower back. Pt performing sup>sit with Poli. Pt performing sit<>stand & side steps to HOB with Poli & use of RW. Will progress as able. Pt is motivated to participate in therapy session & return to PLOF.     Rehab Prognosis: Good and Fair; patient would benefit from acute skilled OT services to address these deficits and reach maximum level of function.       Plan:     Patient to be seen 3 x/week to address the above listed problems via self-care/home management, therapeutic activities, therapeutic exercises  Plan of Care Expires: 04/08/23  Plan of Care Reviewed with: patient    Subjective     Chief Complaint: Pain RLE & lower back  Patient/Family Comments/goals: Pt motivated for rehab    Occupational Profile:  Patient lives alone in a 1st floor apartment, no KESHAWN, T/S with TTB in bathroom.    Prior to admission, patients level of function was  modified independent with gait x household distances with rollator.  Patient has an aide 3 days/ week for assist with ADLs and cleaning home.  Sons live nearby.  Patient has had 2 falls in last 3 months.    Equipment used at home: bedside commode, bath bench, grab bar, rollator, walker, rolling, wheelchair.  DME owned (not currently used): none.  Upon discharge, patient will have assistance from unknown - patient has aide 3 days/week, but currently requires increased assist.    Pain/Comfort:  Pain Rating 1: 8/10  Location - Side 1: Right  Location 1: leg (& lower back)  Pain Addressed 1: Pre-medicate for activity, Reposition, Distraction, Cessation of Activity, Nurse notified  Pain Rating Post-Intervention 1: other (see comments) (not rated)    Patients cultural, spiritual, Yazidism conflicts given the current situation: no    Objective:     Communicated with: nsnadeen prior to session.  Patient found HOB elevated with bed alarm, peripheral IV upon OT entry to room.    General Precautions: Standard, fall  Orthopedic Precautions: N/A  Braces: N/A  Respiratory Status: Room air    Occupational Performance:    Bed Mobility:    Patient completed Rolling/Turning to Right with minimum assistance  Patient completed Scooting/Bridging with minimum assistance  Patient completed Supine to Sit with minimum assistance  Patient completed Sit to Supine with moderate assistance    Functional Mobility/Transfers:  Patient completed Sit <> Stand Transfer with minimum assistance  with  rolling walker   Functional Mobility: Pt performing side steps to HOB with Poli & use of RW.     Cognitive/Visual Perceptual:  Cognitive/Psychosocial Skills:     -       Oriented to: Person, Place, Time, and Situation   -       Safety awareness/insight to disability: impaired   -       Mood/Affect/Coping skills/emotional control: Cooperative    Physical Exam:  Sensation:    -       Intact  light/touch BUE  Upper Extremity Range of Motion:     -       Right  Upper Extremity: Deficits: decreased shoulder flex ~100 degrees  -       Left Upper Extremity: Deficits: decreased shoulder flex ~100 degrees  Upper Extremity Strength:    -       Right Upper Extremity: 3+/5 in available range  -       Left Upper Extremity: 3+/5 in available range   Strength:    -       Right Upper Extremity: WFL  -       Left Upper Extremity: WFL    AMPAC 6 Click ADL:  AMPA Total Score: 15    Treatment & Education:  Pt would benefit from cont OT services in order to maximize functional independence.   OT shalom performed this date with PT, pt agreeable to therapy.   Pt with c/o pain to RLE & lower back.   Pt performing sup>sit with Poli.   Pt performing sit<>stand & side steps to HOB with Poli & use of RW.   Will progress as able.   Pt is motivated to participate in therapy session & return to OF.     Patient left HOB elevated with all lines intact, call button in reach, bed alarm on, and nsg notified    GOALS:   Multidisciplinary Problems       Occupational Therapy Goals          Problem: Occupational Therapy    Goal Priority Disciplines Outcome Interventions   Occupational Therapy Goal     OT, PT/OT Ongoing, Progressing    Description: Goals to be met by: 4/8/2023     Patient will increase functional independence with ADLs by performing:    UE Dressing with Set-up Assistance.  Grooming while seated with Set-up Assistance.  Toileting from bedside commode/toilet with Stand-by Assistance for hygiene and clothing management.   Supine to sit with Modified [place].  Step transfer with Supervision  Toilet transfer to toilet with Supervision.                         History:     Past Medical History:   Diagnosis Date    Allergy     Arthritis     Cataract     Chronic diastolic (congestive) heart failure     Colon cancer     Encounter for blood transfusion     History of ESBL E. 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)          Past Surgical History:   Procedure Laterality Date    ABDOMINAL SURGERY      CATARACT EXTRACTION Left 10/2017     SECTION      CHOLECYSTECTOMY      COLON SURGERY      cystoscope      CYSTOSCOPY W/ RETROGRADES Right 10/10/2019    Procedure: CYSTOSCOPY, WITH RETROGRADE PYELOGRAM;  Surgeon: Gen Isbell MD;  Location: [facility];  Service: Urology;  Laterality: Right;    ERCP N/A 2021    Procedure: ERCP (ENDOSCOPIC RETROGRADE CHOLANGIOPANCREATOGRAPHY);  Surgeon: Jayjay Rivera MD;  Location: [facility] ([address] [place] [address]);  Service: Endoscopy;  Laterality: N/A;    ERCP N/A 3/4/2022    Procedure: ERCP (ENDOSCOPIC RETROGRADE CHOLANGIOPANCREATOGRAPHY);  Surgeon: Roby Virgen MD;  Location: [facility] ([address] [place] [address]);  Service: Endoscopy;  Laterality: N/A;    EYE SURGERY      HYSTERECTOMY  1996    LITHOTRIPSY      LIVER BIOPSY      carcinoid    URETEROSCOPY Right 10/10/2019    Procedure: URETEROSCOPY;  Surgeon: Gen Isbell MD;  Location: [facility];  Service: Urology;  Laterality: Right;    UTERINE FIBROID SURGERY         Time Tracking:     OT Date of Treatment: 23  OT Start Time: 1036  OT Stop Time: 1059  OT Total Time (min): 23 min with PT    Billable Minutes:Evaluation 10  Therapeutic Activity 13    3/8/2023

## 2023-03-08 NOTE — ASSESSMENT & PLAN NOTE
Currently on ertapenem at home for ESBL UTI. Per care everywhere- to continue for 6 weeks but end date was unclear. Per patient end date is around 3/14-3/15  Continue ertapenem

## 2023-03-08 NOTE — PROGRESS NOTES
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 She has been having recurrent near syncopal episodes and falls since recent discharge from a skilled nursing facility. On chart review, recent admission at [facility] for ESBL Ecoli and discharged on 2/23/23 to SNF and subsequent home. She was deemed too high risk to intervene on the staghorn calculi per patient. Currently lives at home alone and states needs more help. Reports having watery diarrhea, 3-4 non bloody stools per day since starting antibiotics. Also reports abdominal discomfort and poor po intake. Denies N/V/ fever.     Work up in the ER unremarkable except for a low potassium which was replaced. VQ scan showed low probability of PE. RUE US negative for DVT. No leukocytosis, lactic acidosis. VS stable, afebrile. She received her dose of ertapenem.   Patient is a poor historian. No family at bedside.       Overview/Hospital Course:  No notes on file    Interval History:  No acute events overnight.  Patient complains of feeling fatigued and fear of falling while ambulating.  Otherwise no new complaints.    Review of Systems  Objective:     Vital Signs (Most Recent):  Temp: 98.3 °F (36.8 °C) (03/08/23 1609)  Pulse: 70 (03/08/23 1609)  Resp: 18 (03/08/23 1609)  BP: (!) 155/69 (03/08/23 1609)  SpO2: 98 % (03/08/23 1609)   Vital Signs (24h  Range):  Temp:  [96.5 °F (35.8 °C)-98.3 °F (36.8 °C)] 98.3 °F (36.8 °C)  Pulse:  [68-88] 70  Resp:  [18] 18  SpO2:  [96 %-98 %] 98 %  BP: (145-200)/(67-87) 155/69     Weight: 70.8 kg (156 lb)  Body mass index is 25.18 kg/m².    Intake/Output Summary (Last 24 hours) at 3/8/2023 1636  Last data filed at 3/8/2023 1416  Gross per 24 hour   Intake 100 ml   Output 200 ml   Net -100 ml      Physical Exam  Vitals and nursing note reviewed.   Constitutional:       General: She is not in acute distress.     Comments: RUE PICC in place   HENT:      Head: Normocephalic and atraumatic.      Mouth/Throat:      Mouth: Mucous membranes are moist.   Eyes:      General: No scleral icterus.  Cardiovascular:      Rate and Rhythm: Normal rate and regular rhythm.      Pulses: Normal pulses.      Heart sounds: Normal heart sounds. No murmur heard.  Pulmonary:      Effort: Pulmonary effort is normal. No respiratory distress.      Breath sounds: Normal breath sounds. No wheezing.   Abdominal:      Palpations: Abdomen is soft.      Tenderness: There is no abdominal tenderness.   Musculoskeletal:      Cervical back: Neck supple.      Right lower leg: No edema.      Left lower leg: No edema.   Skin:     General: Skin is warm and dry.      Findings: No bruising or rash.   Neurological:      General: No focal deficit present.      Mental Status: She is alert and oriented to person, place, and time.   Psychiatric:         Mood and Affect: Mood normal.       Significant Labs: All pertinent labs within the past 24 hours have been reviewed.    Significant Imaging: I have reviewed all pertinent imaging results/findings within the past 24 hours.      Assessment/Plan:      * Near syncope  Suspect in setting of debility, poor po intake, ?diarrhea  No obvious signs of acute infection noted - on ertapenem. Blood cultures NGTD  Allergic to IV contrast. Therefore, VQ scan done- low prob for PE. RUE neg for DVT  No orthostatic vitals charted on my  review    Pending c. Difficile as patient reports watery diarrhea started after antibiotics but no systemic signs of infection  Replace electrolytes. Telemetry. Echo showed preserved LVEF    PT/OT consult- recommending SNF      Chronic diastolic (congestive) heart failure  Patient is identified as having Diastolic (HFpEF) heart failure that is Chronic. CHF is currently controlled. Latest ECHO performed and demonstrates- Results for orders placed during the hospital encounter of 07/02/22    Echo    Interpretation Summary  · The left ventricle is normal in size with moderate concentric hypertrophy and normal systolic function.  · The estimated ejection fraction is 70%.  · Grade I left ventricular diastolic dysfunction.  · Mild tricuspid regurgitation.  · The estimated PA systolic pressure is 44 mmHg.  · Normal right ventricular size with normal right ventricular systolic function.  · There is mild pulmonary hypertension.  Monitor on telemetry. Patient is off CHF pathway.  Monitor strict Is&Os and daily weights.  Continue to stress to patient importance of self efficacy and  on diet for CHF. Last BNP reviewed- and noted below No results for input(s): BNP, BNPTRIAGEBLO in the last 168 hours..      Diarrhea  Unclear if this is chronic in setting of neuroendocrine tumor. Since patient reports watery diarrhea since starting antibiotics- c. Difficile pending      Staghorn calculus  Noted, per patient she is too high for intervention per urology      Hypokalemia  Replace po as needed      Essential hypertension  Elevated, uptitrate dose of anti-hypertensives as indicated      Chronic pain syndrome  Continue home regimen      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  Follows with oncology at   Per patient therapy interrupted due to current infection      Hypomagnesemia  Replace as needed      Recurrent UTI  Currently on ertapenem at home for ESBL UTI. Per care everywhere- to continue for 6 weeks but end date was  unclear. Per patient end date is around 3/14-3/15  Continue ertapenem      Debility  H/o recurrent hospitalizations in the last year  PT/OT        VTE Risk Mitigation (From admission, onward)         Ordered     enoxaparin injection 40 mg  Daily         03/07/23 1745     IP VTE HIGH RISK PATIENT  Once         03/07/23 1745     Place sequential compression device  Until discontinued         03/07/23 1745                Discharge Planning   JULIO C:      Code Status: Full Code   Is the patient medically ready for discharge?:     Reason for patient still in hospital (select all that apply): Laboratory test and Pending disposition  Discharge Plan A: Home Health, Home            Estefani Velez MD  Department of Hospital Medicine   [facility]

## 2023-03-08 NOTE — ASSESSMENT & PLAN NOTE
Suspect in setting of debility, poor po intake, ?diarrhea  No obvious signs of acute infection noted - on ertapenem. Blood cultures pending  VQ scan- low prob for PE. RUE neg for DVT  Will get orthostatic vitals, IV fluids after VS  Will check for c. Difficile as patient reports watery diarrhea started after antibiotics but no systemic signs of infection  Replace electrolytes. Telemetry. Last echo showed preserved LVEF    PT/OT consult. May need more support at home or placement

## 2023-03-08 NOTE — PLAN OF CARE
03/07/23 2211   Admission   Initial VN Admission Questions Complete   Communication Issues? None   Shift   Virtual Nurse - Rounding Complete   Pain Management Interventions pain management plan reviewed with patient/caregiver   Virtual Nurse - Patient Verbalized Approval Of Camera Use;VN Rounding   Type of Frequent Check   Type Patient Rounds   Safety/Activity   Patient Rounds bed in low position;call light in patient/parent reach;placement of personal items at bedside;visualized patient;clutter free environment maintained   Safety Promotion/Fall Prevention Fall Risk reviewed with patient/family;side rails raised x 2;medications reviewed;instructed to call staff for mobility   Positioning   Body Position sitting up in bed   Pt arrived to unit. Introduced self as VN for this shift. Admission questions completed by VN. Educated pt on VTE risk, safety precautions, and VN's role in pt care. Opportunity given for pt's questions. All questions answered.

## 2023-03-08 NOTE — ASSESSMENT & PLAN NOTE
Unclear if this is chronic in setting of neuroendocrine tumor. Since patient reports watery diarrhea since starting antibiotics will add c. diff

## 2023-03-08 NOTE — ASSESSMENT & PLAN NOTE
Suspect in setting of debility, poor po intake, ?diarrhea  No obvious signs of acute infection noted - on ertapenem. Blood cultures NGTD  Allergic to IV contrast. Therefore, VQ scan done- low prob for PE. RUE neg for DVT  No orthostatic vitals charted on my review    Pending c. Difficile as patient reports watery diarrhea started after antibiotics but no systemic signs of infection  Replace electrolytes. Telemetry. Echo showed preserved LVEF    PT/OT consult- recommending SNF

## 2023-03-08 NOTE — PLAN OF CARE
Pt would benefit from cont OT services in order to maximize functional independence. Recommending SNF upon d/c.  OT shalom performed this date with PT, pt agreeable to therapy. Pt with c/o pain to RLE & lower back. Pt performing sup>sit with Poli. Pt performing sit<>stand & side steps to HOB with Poli & use of RW. Will progress as able. Pt is motivated to participate in therapy session & return to PLOF.     Problem: Occupational Therapy  Goal: Occupational Therapy Goal  Description: Goals to be met by: 4/8/2023     Patient will increase functional independence with ADLs by performing:    UE Dressing with Set-up Assistance.  Grooming while seated with Set-up Assistance.  Toileting from bedside commode/toilet with Stand-by Assistance for hygiene and clothing management.   Supine to sit with Modified [place].  Step transfer with Supervision  Toilet transfer to toilet with Supervision.    Outcome: Ongoing, Progressing

## 2023-03-08 NOTE — PLAN OF CARE
" met with patient on round this morning. She was going off the floor for testing. She asked that I call her son Ryan. I will follow-up with patient at a later time. Patient lives at home alone. She has 3 sons that provide assistance as well. She is current with [facility]. Son is unsure of names of companies. Patient was recently discharged from [facility] last month  he reports. Patient uses a rollator at home. He is unsure if she has extra "help" from insurance. I provided him with Medicaid PCA information as well. Son encouraged to call with any questions or concerns.  will continue to follow patient through transitions of care and assist with any discharge needs.      met with patient. She confirms she has a rollator, bath bench, bsc, and wheelchair at home. The [place] [facility] provides an aide 3x/week for her through the [place].    -- spoke with patient regarding SNF placement. Therapy recs SNF and patient requesting as well. Patient was recently at [facility] 2/10-2/23. I discussed placement with patient and OBS status. Patient refuses NH SNF or [facility] placement. She told me she would only go to [facility] or  SNF. I contacted both of them and no beds available. Patient would likely be in her copayment days as well. Plan will have to be Home with Home Health. Case discussed with Maryanne Obs Supervisor.    --[phone]  met with patient and updated her on the above information. I told her will have [facility] again and [facility] as well.    PCP appt requested from access navigators.     Patient Contacts    Name Relation Home Work Mobile   Matias Kemp Son   [phone]   [place] Domingo Son   [phone]   Ryan Domingo Son   [phone]   Jon Dove Son   [phone]     MEDICARE & MEDICAID SERVICES MEDICARE & MEDICAID SERVICES           Next Steps: Follow up  Appointment:   Instructions: Medicaid PCA (Please " call to see if you qualify for a personal care attendant). Phone#[phone]     Shaun  [facility]   [phone] [address] [facility] [address]      Next Steps: Follow up  Appointment:   Instructions: Home Health Company    [facility]fusion Provider [phone] [address] [place] [address]     Next Steps: Follow up  Appointment:   Instructions: [facility]          03/08/23 1219   Discharge Assessment   Assessment Type Discharge Planning Assessment   Confirmed/corrected address, phone number and insurance Yes   Confirmed Demographics Correct on Facesheet   Source of Information family;health record   People in Home alone   Facility Arrived From: Home   Do you expect to return to your current living situation? Yes   Do you have help at home or someone to help you manage your care at home? Yes   Who are your caregiver(s) and their phone number(s)? Ryan (Son) Phone#[phone]   Prior to hospitilization cognitive status: Alert/Oriented   Current cognitive status: Alert/Oriented   Walking or Climbing Stairs ambulation difficulty, requires equipment   Dressing/Bathing bathing difficulty, requires equipment   Equipment Currently Used at Home rollator   Do you take prescription medications? Yes   Do you have any problems affording any of your prescribed medications? No   Is the patient taking medications as prescribed? yes   Who is going to help you get home at discharge? Ryan (Son) Phone#[phone]   How do you get to doctors appointments? family or friend will provide   Are you on dialysis? No   Do you take coumadin? No   Discharge Plan A Home Health;Home   Discharge Plan B Home with family;Home Health   DME Needed Upon Discharge  none   Discharge Plan discussed with: Adult children   Discharge Barriers Identified None   Physical Activity   On average, how many days per  week do you engage in moderate to strenuous exercise (like a brisk walk)? Patient refu   On average, how many minutes do you engage in exercise at this level? Patient refu   Financial Resource Strain   How hard is it for you to pay for the very basics like food, housing, medical care, and heating? Patient refu   Housing Stability   In the last 12 months, was there a time when you were not able to pay the mortgage or rent on time? N   In the last 12 months, was there a time when you did not have a steady place to sleep or slept in a shelter (including now)? N   Transportation Needs   In the past 12 months, has lack of transportation kept you from medical appointments or from getting medications? no   In the past 12 months, has lack of transportation kept you from meetings, work, or from getting things needed for daily living? No   Food Insecurity   Within the past 12 months, you worried that your food would run out before you got the money to buy more. Never true   Within the past 12 months, the food you bought just didn't last and you didn't have money to get more. Never true   Stress   Do you feel stress - tense, restless, nervous, or anxious, or unable to sleep at night because your mind is troubled all the time - these days? Patient refu   Social Connections   In a typical week, how many times do you talk on the phone with family, friends, or neighbors? More than 3   How often do you get together with friends or relatives? More than 3   How often do you attend Hindu or Restoration services? Patient refu   Do you belong to any clubs or organizations such as Hindu groups, unions, fraternal or athletic groups, or school groups? Patient refu   How often do you attend meetings of the clubs or organizations you belong to? Patient refu   Are you , , , , never , or living with a partner? Patient refu   Alcohol Use   Q1: How often do you have a drink containing alcohol? Patient refu    Q2: How many drinks containing alcohol do you have on a typical day when you are drinking? Patient refu   Q3: How often do you have six or more drinks on one occasion? Patient refu     Lynette Epps RN    [phone]

## 2023-03-08 NOTE — ASSESSMENT & PLAN NOTE
Unclear if this is chronic in setting of neuroendocrine tumor. Since patient reports watery diarrhea since starting antibiotics- c. Difficile pending

## 2023-03-08 NOTE — H&P
[facility] - Emergency Dept  [facility] Medicine  History & Physical    Patient Name: Patito Domingo  MRN: [id number]  Patient Class: OP- Observation  Admission Date: 3/7/2023  Attending Physician: Estefani Velez MD   Primary Care Provider: Mariela Wei DO         Patient information was obtained from patient, past medical records and ER records.     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 She has been having recurrent near syncopal episodes and falls since recent discharge from a skilled nursing facility. On chart review, recent admission at [facility] for ESBL Ecoli and discharged on 2/23/23 to SNF and subsequent home. She was deemed too high risk to intervene on the staghorn calculi per patient. Currently lives at home alone and states needs more help. Reports having watery diarrhea, 3-4 non bloody stools per day since starting antibiotics. Also reports abdominal discomfort and poor po intake. Denies N/V/ fever.     Work up in the ER unremarkable except for a low potassium which was replaced. VQ scan showed low probability of PE. RUE US negative for DVT. No leukocytosis, lactic acidosis. VS stable, afebrile. She received her dose of ertapenem.   Patient is a poor historian. No family at bedside.       Past Medical History:   Diagnosis Date    Allergy     Arthritis     Cataract     Chronic diastolic (congestive) heart failure      Colon cancer     Encounter for blood transfusion     History of ESBL E. 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)        Past Surgical History:   Procedure Laterality Date    ABDOMINAL SURGERY      CATARACT EXTRACTION Left 10/2017     SECTION      CHOLECYSTECTOMY      COLON SURGERY      cystoscope      CYSTOSCOPY W/ RETROGRADES Right 10/10/2019    Procedure: CYSTOSCOPY, WITH RETROGRADE PYELOGRAM;  Surgeon: Gen Isbell MD;  Location: [facility] OR;  Service: Urology;  Laterality: Right;    ERCP N/A 2021    Procedure: ERCP (ENDOSCOPIC RETROGRADE CHOLANGIOPANCREATOGRAPHY);  Surgeon: Jayjay Rivera MD;  Location: [facility] (2ND FLR);  Service: Endoscopy;  Laterality: N/A;    ERCP N/A 3/4/2022    Procedure: ERCP (ENDOSCOPIC RETROGRADE CHOLANGIOPANCREATOGRAPHY);  Surgeon: Roby Virgen MD;  [facility]  Service: Endoscopy;  Laterality: N/A;    EYE SURGERY      HYSTERECTOMY  1996    LITHOTRIPSY      LIVER BIOPSY      carcinoid    URETEROSCOPY Right 10/10/2019    Procedure: URETEROSCOPY;  Surgeon: Gen Isbell MD;  Location: [facility] OR;  Service: Urology;  Laterality: Right;    UTERINE FIBROID SURGERY         Review of patient's allergies indicates:   Allergen Reactions    Contrast media Hives, Itching and Swelling    Epinephrine Anaphylaxis     Can cause  a Carcinoid Crisis    Ibuprofen Hives, Itching and Swelling    Zofran [ondansetron hcl] Itching     And multiple other reactions    Iodinated contrast media     Morphine Other (See Comments)    Sulfa (sulfonamide antibiotics) Hives, Itching and Swelling       No current facility-administered medications on file prior to encounter.     Current Outpatient Medications on File Prior to  Encounter   Medication Sig    acetaminophen (TYLENOL) 325 MG tablet Take 650 mg by mouth every 6 (six) hours as needed.    amLODIPine (NORVASC) 5 MG tablet Take 5 mg by mouth once daily.    atorvastatin (LIPITOR) 40 MG tablet Take 1 tablet (40 mg total) by mouth every evening.    cyanocobalamin (VITAMIN B-12) 1000 MCG tablet Take 1 tablet (1,000 mcg total) by mouth once daily.    DAILY PROBIOTIC, S. BOULARDII, 250 mg capsule Take 250 mg by mouth once daily.    diclofenac sodium (VOLTAREN) 1 % Gel Apply 2 g topically 2 (two) times daily as needed.    ertapenem (INVANZ) 1 gram injection Inject 1 g into the vein once daily.    LIDOcaine (LIDODERM) 5 % Place 1 patch onto the skin once daily. Remove & Discard patch within 12 hours or as directed by MD    loperamide (IMODIUM) 2 mg capsule Take 2 mg by mouth 4 (four) times daily as needed.    losartan (COZAAR) 100 MG tablet Take 0.5 tablets (50 mg total) by mouth once daily. (Patient taking differently: Take 100 mg by mouth once daily.)    methyl salicylate-menthol 15-10% 15-10 % Crea Apply topically 3 (three) times daily. (Patient taking differently: Apply topically 3 (three) times daily as needed.)    metoprolol tartrate (LOPRESSOR) 25 MG tablet Take 25 mg by mouth 2 (two) times daily.    nitrofurantoin, macrocrystal-monohydrate, (MACROBID) 100 MG capsule Take 100 mg by mouth 2 (two) times daily.    oxyCODONE (ROXICODONE) 15 MG Tab Take 15 mg by mouth 4 (four) times daily as needed.    phenazopyridine (PYRIDIUM) 200 MG tablet Take 200 mg by mouth 3 (three) times daily as needed.    trolamine salicylate (ASPERCREME) 10 % cream Apply topically as needed.    [DISCONTINUED] amitriptyline (ELAVIL) 10 MG tablet Take 10 mg by mouth.    [DISCONTINUED] metoprolol succinate (TOPROL-XL) 25 MG 24 hr tablet Take 25 mg by mouth.    [DISCONTINUED] minocycline (DYNACIN) 100 MG tablet Take 100 mg by mouth.    [DISCONTINUED] multivitamin with folic acid 400 mcg Tab Take 1 tablet by mouth.     [DISCONTINUED] phenazopyridine (PYRIDIUM) 200 MG tablet Take 200 mg by mouth.    0.9 % sodium chloride (SODIUM CHLORIDE 0.9%) solution Inject into the vein once daily.    BINAXNOW COVID-19 AG SELF TEST Kit TEST AS DIRECTED TODAY    meclizine (ANTIVERT) 25 mg tablet Take 1 tablet (25 mg total) by mouth 3 (three) times daily as needed for Dizziness.    pantoprazole (PROTONIX) 40 MG tablet TAKE 1 TABLET(40 MG) BY MOUTH EVERY DAY (Patient taking differently: Take 40 mg by mouth 2 (two) times daily as needed.)    [DISCONTINUED] amitriptyline (ELAVIL) 10 MG tablet Take 10 mg by mouth.    [DISCONTINUED] amLODIPine (NORVASC) 10 MG tablet Take 1 tablet (10 mg total) by mouth once daily.    [DISCONTINUED] amoxicillin-clavulanate 875-125mg (AUGMENTIN) 875-125 mg per tablet Take 1 tablet by mouth 2 (two) times daily. (Patient not taking: Reported on 2/3/2023)    [DISCONTINUED] baclofen (LIORESAL) 10 MG tablet TAKE 1 TABLET BY MOUTH 2 TIMES DAILY AS NEEDED FOR MUSCLE CRAMPS (Patient not taking: Reported on 2/3/2023)    [DISCONTINUED] cefTRIAXone (ROCEPHIN) 1 gram/50 mL IVPB     [DISCONTINUED] dicyclomine (BENTYL) 20 mg tablet TAKE 1 TABLET(20 MG) BY MOUTH THREE TIMES DAILY AS NEEDED FOR ABDOMINAL PAIN    [DISCONTINUED] gabapentin (NEURONTIN) 300 MG capsule Take 1 capsule (300 mg total) by mouth every evening. (Patient not taking: Reported on 2/3/2023)    [DISCONTINUED] magnesium oxide (MAG-OX) 400 mg (241.3 mg magnesium) tablet Take 1 tablet (400 mg total) by mouth 2 (two) times daily. (Patient not taking: Reported on 2/3/2023)    [DISCONTINUED] methenamine (HIPREX) 1 gram Tab Take 1 tablet (1 g total) by mouth 2 (two) times daily. (Patient not taking: Reported on 2/3/2023)    [DISCONTINUED] methocarbamoL (ROBAXIN) 750 MG Tab TAKE 2 TABLETS BY MOUTH FOUR TIMES DAILY FOR 10 DAYS    [DISCONTINUED] metoprolol succinate (TOPROL-XL) 25 MG 24 hr tablet Take 1 tablet (25 mg total) by mouth 2 (two) times daily.    [DISCONTINUED]  miconazole NITRATE 2 % (MICOTIN) 2 % top powder Apply topically 2 (two) times daily. for 10 days    [DISCONTINUED] minocycline (MINOCIN,DYNACIN) 100 MG capsule Take 100 mg by mouth 2 (two) times daily.    [DISCONTINUED] naloxone (NARCAN) 4 mg/actuation Spry SMARTSIG:Both Nares    [DISCONTINUED] nystatin-triamcinolone (MYCOLOG II) cream Apply topically 2 (two) times daily. Apply to vaginal introitus area. (Patient not taking: Reported on 2/28/2023)    [DISCONTINUED] oxyCODONE (ROXICODONE) 10 mg Tab immediate release tablet Take 1 tablet (10 mg total) by mouth every 4 (four) hours as needed for Pain. (Patient not taking: Reported on 2/3/2023)    [DISCONTINUED] oxyCODONE (ROXICODONE) 15 MG Tab Take 15 mg by mouth.    [DISCONTINUED] polyethylene glycol (GLYCOLAX) 17 gram PwPk Take 17 g by mouth once daily. (Patient not taking: Reported on 2/28/2023)    [DISCONTINUED] senna-docusate 8.6-50 mg (PERICOLACE) 8.6-50 mg per tablet Take 1 tablet by mouth 2 (two) times daily.     Family History       Problem Relation (Age of Onset)    Alzheimer's disease Father    Cancer Mother    No Known Problems Son, Son, Son, Son    Stroke Sister          Tobacco Use    Smoking status: Never    Smokeless tobacco: Never   Substance and Sexual Activity    Alcohol use: No     Alcohol/week: 0.0 standard drinks    Drug use: No    Sexual activity: Not Currently     Review of Systems   Constitutional:  Positive for fatigue.   Gastrointestinal:  Positive for abdominal pain and diarrhea.   Genitourinary:  Positive for frequency.   Musculoskeletal:  Positive for myalgias.   Neurological:  Positive for light-headedness.   Objective:     Vital Signs (Most Recent):  Temp: 97.5 °F (36.4 °C) (03/07/23 0936)  Pulse: 70 (03/07/23 1114)  Resp: 16 (03/07/23 1302)  BP: 127/61 (03/07/23 0936)  SpO2: 98 % (03/07/23 1114) Vital Signs (24h Range):  Temp:  [97.5 °F (36.4 °C)] 97.5 °F (36.4 °C)  Pulse:  [70] 70  Resp:  [16-18] 16  SpO2:  [97 %-98 %] 98 %  BP:  (127)/(61) 127/61     Weight: 81.6 kg (180 lb)  Body mass index is 29.05 kg/m².    Physical Exam  Vitals and nursing note reviewed.   Constitutional:       General: She is not in acute distress.  HENT:      Head: Normocephalic and atraumatic.   Eyes:      General: No scleral icterus.  Cardiovascular:      Rate and Rhythm: Normal rate and regular rhythm.      Pulses: Normal pulses.      Heart sounds: Normal heart sounds. No murmur heard.  Pulmonary:      Effort: Pulmonary effort is normal. No respiratory distress.      Breath sounds: Normal breath sounds. No wheezing.   Abdominal:      Palpations: Abdomen is soft.      Tenderness: There is abdominal tenderness (generalized mild).   Musculoskeletal:      Right lower leg: No edema.      Left lower leg: No edema.   Skin:     General: Skin is warm and dry.      Findings: No bruising or rash.   Neurological:      Mental Status: She is alert and oriented to person, place, and time.      Comments: Strength in b/l LE limited likely due to pain which is chronic per patient   Psychiatric:         Mood and Affect: Mood normal.           Significant Labs: All pertinent labs within the past 24 hours have been reviewed.    Significant Imaging: I have reviewed all pertinent imaging results/findings within the past 24 hours.    Assessment/Plan:     * Near syncope  Suspect in setting of debility, poor po intake, ?diarrhea  No obvious signs of acute infection noted - on ertapenem. Blood cultures pending  Allergic to IV contrast. Therefore, VQ scan done- low prob for PE. RUE neg for DVT  Will get orthostatic vitals, IV fluids after VS  Will check for c. Difficile as patient reports watery diarrhea started after antibiotics but no systemic signs of infection  Replace electrolytes. Telemetry. Last echo showed preserved LVEF. Repeat echo    PT/OT consult. May need more support at home or placement      Chronic diastolic (congestive) heart failure  Patient is identified as having Diastolic  (HFpEF) heart failure that is Chronic. CHF is currently controlled. Latest ECHO performed and demonstrates- Results for orders placed during the hospital encounter of 07/02/22    Echo    Interpretation Summary  · The left ventricle is normal in size with moderate concentric hypertrophy and normal systolic function.  · The estimated ejection fraction is 70%.  · Grade I left ventricular diastolic dysfunction.  · Mild tricuspid regurgitation.  · The estimated PA systolic pressure is 44 mmHg.  · Normal right ventricular size with normal right ventricular systolic function.  · There is mild pulmonary hypertension.  Monitor on telemetry. Patient is off CHF pathway.  Monitor strict Is&Os and daily weights.  Continue to stress to patient importance of self efficacy and  on diet for CHF. Last BNP reviewed- and noted below No results for input(s): BNP, BNPTRIAGEBLO in the last 168 hours..      Diarrhea  Unclear if this is chronic in setting of neuroendocrine tumor. Since patient reports watery diarrhea since starting antibiotics will add c. diff      Staghorn calculus  Noted, per patient she is too high for intervention per urology      Hypokalemia  Replace po as needed      Essential hypertension  Stable, continue home meds      Chronic pain syndrome  Continue home regimen      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  Follows with oncology at   Per patient therapy interrupted due to current infection      Recurrent UTI  Currently on ertapenem at home for ESBL UTI. Per care everywhere- to continue for 6 weeks but end date was unclear. Per patient end date is around 3/14-3/15  Continue ertapenem      Debility  H/o recurrent hospitalizations in the last year  PT/OT        VTE Risk Mitigation (From admission, onward)           Ordered     enoxaparin injection 40 mg  Daily         03/07/23 1745     IP VTE HIGH RISK PATIENT  Once         03/07/23 1745     Place sequential compression device  Until discontinued          03/07/23 1745                  Confirmed code status- full code. Patient's 4 sons make decisions together when she needs assistance. She does not have a designated HCPOA.     Estefani Velez MD  Department of Hospital Medicine   [facility] - Emergency Dept

## 2023-03-08 NOTE — PLAN OF CARE
Patient seen for physical therapy evaluation on this date, co-evaluation with occupational therapy.  Patient agreeable to therapy, reports being fearful of falling.   Patient performs supine to sit with min assist, sit to supine with mod assist, and sit to stand with min assist to RW.  Patient tolerates taking 3-4 lateral steps with RW and min assist and 3-4 steps forward and back with RW and min assist, slow pace, flexed trunk.  Full report to follow.  Anticipate patient will require continued rehabilitation in a skilled nursing facility setting.  Physical therapy will continue to follow.      Problem: Physical Therapy  Goal: Physical Therapy Goal  Description: Goals to be met by: 2023     Patient will increase functional independence with mobility by performin. Supine to sit with Stand-by Assistance  2. Sit to supine with Stand-by Assistance  3. Rolling to Left and Right with Stand-by Assistance.  5. Sit to stand transfer with Stand-by Assistance  6. Bed to chair transfer with Stand-by Assistance using Rolling Walker  7. Gait  x 100 feet with Contact Guard Assistance using Rolling Walker.     Outcome: Ongoing, Progressing

## 2023-03-08 NOTE — ASSESSMENT & PLAN NOTE
Patient is identified as having Diastolic (HFpEF) heart failure that is Chronic. CHF is currently controlled. Latest ECHO performed and demonstrates- Results for orders placed during the hospital encounter of 07/02/22    Echo    Interpretation Summary  · The left ventricle is normal in size with moderate concentric hypertrophy and normal systolic function.  · The estimated ejection fraction is 70%.  · Grade I left ventricular diastolic dysfunction.  · Mild tricuspid regurgitation.  · The estimated PA systolic pressure is 44 mmHg.  · Normal right ventricular size with normal right ventricular systolic function.  · There is mild pulmonary hypertension.  Monitor on telemetry. Patient is off CHF pathway.  Monitor strict Is&Os and daily weights.  Continue to stress to patient importance of self efficacy and  on diet for CHF. Last BNP reviewed- and noted below No results for input(s): BNP, BNPTRIAGEBLO in the last 168 hours..

## 2023-03-08 NOTE — SUBJECTIVE & OBJECTIVE
Interval History:  No acute events overnight.  Patient complains of feeling fatigued and fear of falling while ambulating.  Otherwise no new complaints.    Review of Systems  Objective:     Vital Signs (Most Recent):  Temp: 98.3 °F (36.8 °C) (03/08/23 1609)  Pulse: 70 (03/08/23 1609)  Resp: 18 (03/08/23 1609)  BP: (!) 155/69 (03/08/23 1609)  SpO2: 98 % (03/08/23 1609)   Vital Signs (24h Range):  Temp:  [96.5 °F (35.8 °C)-98.3 °F (36.8 °C)] 98.3 °F (36.8 °C)  Pulse:  [68-88] 70  Resp:  [18] 18  SpO2:  [96 %-98 %] 98 %  BP: (145-200)/(67-87) 155/69     Weight: 70.8 kg (156 lb)  Body mass index is 25.18 kg/m².    Intake/Output Summary (Last 24 hours) at 3/8/2023 1636  Last data filed at 3/8/2023 1416  Gross per 24 hour   Intake 100 ml   Output 200 ml   Net -100 ml      Physical Exam  Vitals and nursing note reviewed.   Constitutional:       General: She is not in acute distress.     Comments: RUE PICC in place   HENT:      Head: Normocephalic and atraumatic.      Mouth/Throat:      Mouth: Mucous membranes are moist.   Eyes:      General: No scleral icterus.  Cardiovascular:      Rate and Rhythm: Normal rate and regular rhythm.      Pulses: Normal pulses.      Heart sounds: Normal heart sounds. No murmur heard.  Pulmonary:      Effort: Pulmonary effort is normal. No respiratory distress.      Breath sounds: Normal breath sounds. No wheezing.   Abdominal:      Palpations: Abdomen is soft.      Tenderness: There is no abdominal tenderness.   Musculoskeletal:      Cervical back: Neck supple.      Right lower leg: No edema.      Left lower leg: No edema.   Skin:     General: Skin is warm and dry.      Findings: No bruising or rash.   Neurological:      General: No focal deficit present.      Mental Status: She is alert and oriented to person, place, and time.   Psychiatric:         Mood and Affect: Mood normal.       Significant Labs: All pertinent labs within the past 24 hours have been reviewed.    Significant Imaging: I  have reviewed all pertinent imaging results/findings within the past 24 hours.

## 2023-03-08 NOTE — PLAN OF CARE
"  Problem: Adult Inpatient Plan of Care  Goal: Plan of Care Review  Outcome: Ongoing, Progressing     Problem: Adult Inpatient Plan of Care  Goal: Optimal Comfort and Wellbeing  Outcome: Ongoing, Progressing     Problem: Fall Injury Risk  Goal: Absence of Fall and Fall-Related Injury  Outcome: Ongoing, Progressing     Problem: Hypertension Comorbidity  Goal: Blood Pressure in Desired Range  Outcome: Ongoing, Progressing     Problem: Pain Acute (Oncology Care)  Goal: Optimal Pain Control  Outcome: Ongoing, Progressing    Patient got in from ED @ 2100 hrs. Oriented the patient to the unit and the nurse call button. Vital signs assessed and documented as in the flow chart. Plan of care reviewed with the patient. Scheduled medicines given and the patient tolerated well.  Received Labetalol 10 mg IV for the /76. Given Hydromorphone 0.5 mg IV for the pain in abdomen 9/10 also given Oxycodone 15 mg PO for 8/10 pain. Fall and safety precautions taken and the standard interventions are in place. On Telemetry monitoring with NSR, no true "red alarms' noted, no acute distress reported either in the shift. Informed NP about patient experiencing diarrhea and the stool sample sent for C-Diff. Advised the patient to call for the assistance. Continued monitoring the patient.      "

## 2023-03-09 VITALS
SYSTOLIC BLOOD PRESSURE: 137 MMHG | HEIGHT: 66 IN | HEART RATE: 80 BPM | TEMPERATURE: 97 F | DIASTOLIC BLOOD PRESSURE: 63 MMHG | RESPIRATION RATE: 18 BRPM | WEIGHT: 156 LBS | BODY MASS INDEX: 25.07 KG/M2 | OXYGEN SATURATION: 98 %

## 2023-03-09 LAB
BACTERIA UR CULT: NORMAL
HGB BLD-MCNC: 7.1 G/DL (ref 12–16)
MAGNESIUM SERPL-MCNC: 2 MG/DL (ref 1.6–2.6)
POTASSIUM SERPL-SCNC: 3.2 MMOL/L (ref 3.5–5.1)

## 2023-03-09 PROCEDURE — 96366 THER/PROPH/DIAG IV INF ADDON: CPT

## 2023-03-09 PROCEDURE — 97530 THERAPEUTIC ACTIVITIES: CPT | Mod: HCNC,CQ

## 2023-03-09 PROCEDURE — 97116 GAIT TRAINING THERAPY: CPT | Mod: HCNC,CQ

## 2023-03-09 PROCEDURE — 25000003 PHARM REV CODE 250: Mod: HCNC | Performed by: STUDENT IN AN ORGANIZED HEALTH CARE EDUCATION/TRAINING PROGRAM

## 2023-03-09 PROCEDURE — 63600175 PHARM REV CODE 636 W HCPCS: Mod: HCNC | Performed by: STUDENT IN AN ORGANIZED HEALTH CARE EDUCATION/TRAINING PROGRAM

## 2023-03-09 PROCEDURE — 84132 ASSAY OF SERUM POTASSIUM: CPT | Mod: HCNC | Performed by: STUDENT IN AN ORGANIZED HEALTH CARE EDUCATION/TRAINING PROGRAM

## 2023-03-09 PROCEDURE — 83735 ASSAY OF MAGNESIUM: CPT | Mod: HCNC | Performed by: STUDENT IN AN ORGANIZED HEALTH CARE EDUCATION/TRAINING PROGRAM

## 2023-03-09 PROCEDURE — 85018 HEMOGLOBIN: CPT | Mod: HCNC | Performed by: STUDENT IN AN ORGANIZED HEALTH CARE EDUCATION/TRAINING PROGRAM

## 2023-03-09 PROCEDURE — 97535 SELF CARE MNGMENT TRAINING: CPT | Mod: HCNC,CO

## 2023-03-09 PROCEDURE — G0378 HOSPITAL OBSERVATION PER HR: HCPCS | Mod: HCNC

## 2023-03-09 PROCEDURE — 97530 THERAPEUTIC ACTIVITIES: CPT | Mod: HCNC,CO

## 2023-03-09 RX ORDER — LIDOCAINE 50 MG/G
1 PATCH TOPICAL DAILY
Status: DISCONTINUED | OUTPATIENT
Start: 2023-03-09 | End: 2023-03-09 | Stop reason: HOSPADM

## 2023-03-09 RX ORDER — AMLODIPINE BESYLATE 5 MG/1
5 TABLET ORAL DAILY
Status: DISCONTINUED | OUTPATIENT
Start: 2023-03-09 | End: 2023-03-09 | Stop reason: HOSPADM

## 2023-03-09 RX ORDER — LOSARTAN POTASSIUM 100 MG/1
100 TABLET ORAL DAILY
Qty: 60 TABLET | Refills: 2 | Status: SHIPPED | OUTPATIENT
Start: 2023-03-09 | End: 2023-04-13 | Stop reason: SDUPTHER

## 2023-03-09 RX ORDER — LOPERAMIDE HYDROCHLORIDE 2 MG/1
2 CAPSULE ORAL 4 TIMES DAILY PRN
Status: DISCONTINUED | OUTPATIENT
Start: 2023-03-09 | End: 2023-03-09 | Stop reason: HOSPADM

## 2023-03-09 RX ADMIN — OXYCODONE HYDROCHLORIDE 15 MG: 5 TABLET ORAL at 02:03

## 2023-03-09 RX ADMIN — LOSARTAN POTASSIUM 100 MG: 50 TABLET, FILM COATED ORAL at 12:03

## 2023-03-09 RX ADMIN — CYANOCOBALAMIN TAB 1000 MCG 1000 MCG: 1000 TAB at 12:03

## 2023-03-09 RX ADMIN — AMLODIPINE BESYLATE 5 MG: 5 TABLET ORAL at 12:03

## 2023-03-09 RX ADMIN — OXYCODONE HYDROCHLORIDE 15 MG: 5 TABLET ORAL at 06:03

## 2023-03-09 RX ADMIN — POTASSIUM BICARBONATE 40 MEQ: 391 TABLET, EFFERVESCENT ORAL at 12:03

## 2023-03-09 RX ADMIN — ERTAPENEM 1 G: 1 INJECTION INTRAMUSCULAR; INTRAVENOUS at 01:03

## 2023-03-09 RX ADMIN — POLYETHYLENE GLYCOL 3350 17 G: 17 POWDER, FOR SOLUTION ORAL at 09:03

## 2023-03-09 RX ADMIN — LIDOCAINE 1 PATCH: 50 PATCH CUTANEOUS at 12:03

## 2023-03-09 RX ADMIN — METOPROLOL TARTRATE 25 MG: 25 TABLET, FILM COATED ORAL at 12:03

## 2023-03-09 NOTE — PT/OT/SLP PROGRESS
Occupational Therapy   Treatment    Name: Patito Domingo  MRN: [id number]  Admitting Diagnosis:  Near syncope       Recommendations:     Discharge Recommendations: nursing facility, skilled  Discharge Equipment Recommendations:  none  Barriers to discharge:  Other (Comment) (Increased level of assistance. Pt is not at baseline per pt report)    Assessment:     Patito Domingo is a 72 y.o. female with a medical diagnosis of Near syncope.  Performance deficits affecting function are weakness, impaired endurance, impaired self care skills, impaired functional mobility, gait instability, impaired balance, decreased upper extremity function, decreased lower extremity function, pain. Pt found in bed, agreeable to therapy.  Pt reporting B calf pain R>L.  RN notified and reports MD aware and cleared pt to continue with therapy.  Pt required Mod A to transition supine to sit and transferred to chair with Min A using RW.  Pt is progressing towards goals. Continue OT services to address functional goals, progressing as able.      Rehab Prognosis:  Good; patient would benefit from acute skilled OT services to address these deficits and reach maximum level of function.       Plan:     Patient to be seen 3 x/week to address the above listed problems via self-care/home management, therapeutic activities, therapeutic exercises  Plan of Care Expires: 04/08/23  Plan of Care Reviewed with: patient    Subjective     Pain/Comfort:  Pain Rating 1: 10/10  Location - Side 1: Right  Location - Orientation 1: lower  Location 1: calf  Pain Addressed 1: Nurse notified, Distraction  Pain Rating Post-Intervention 1: 10/10  Pain Rating 2: 6/10  Location - Side 2: Left  Location - Orientation 2: generalized  Location 2: calf  Pain Addressed 2: Distraction, Nurse notified    Objective:     Communicated with: RN prior to session.  Patient found HOB elevated with bed alarm, peripheral IV, telemetry upon OT entry to room.    General Precautions:  Standard, fall    Orthopedic Precautions:N/A  Braces: N/A  Respiratory Status: Room air     Occupational Performance:     Bed Mobility:    Patient completed Rolling/Turning to Left with  minimum assistance for assist to reach across for bed rial.   Patient completed Scooting/Bridging with stand by assistance to scoot seated to EOB  Patient completed Supine to Sit with moderate assistance HOB elevated, increased time and effort, vc's for effective technique    Functional Mobility/Transfers:  Patient completed Sit <> Stand Transfer with minimum assistance  with  rolling walker and vcs for hand placement   Patient completed Bed <> Chair Transfer using Stand Pivot technique with contact guard assistance and minimum assistance with rolling walker  Functional Mobility: Pt took 4 steps to chair with CGA/Min A using RW.      Activities of Daily Living:  Grooming: supervision chair level      [facility] 6 Click ADL: 17    Treatment & Education:  Pt reports BLE pain and stiffness.  Pts BLEs with poor positioning in bed, pushed up against foot rail. Assisted pt with repositioning and gentle AAROM prior to transitioning EOB.    Spoke with pts nurse Nuvia Palacios RN in reference to pts calf pain. RN reports MD aware and pt is low risk for blood clot and currently on blood thinners.  She cleared pt for therapy.      Patient left up in chair with all lines intact, call button in reach, bed alarm on, and RN notified    GOALS:   Multidisciplinary Problems       Occupational Therapy Goals          Problem: Occupational Therapy    Goal Priority Disciplines Outcome Interventions   Occupational Therapy Goal     OT, PT/OT Ongoing, Progressing    Description: Goals to be met by: 4/8/2023     Patient will increase functional independence with ADLs by performing:    UE Dressing with Set-up Assistance.  Grooming while seated with Set-up Assistance.  Toileting from bedside commode/toilet with Stand-by Assistance for hygiene and clothing  management.   Supine to sit with Modified [place].  Step transfer with Supervision  Toilet transfer to toilet with Supervision.                         Time Tracking:     OT Date of Treatment: 03/09/23  OT Start Time: 1035  OT Stop Time: 1100  OT Total Time (min): 25 min    Billable Minutes:Self Care/Home Management 10  Therapeutic Activity 15    3/9/2023

## 2023-03-09 NOTE — PLAN OF CARE
"0730  Order for "ambulatory referral to out-patient case management" entered. Referrals previously scheduled [facility] via [facility].    0845  Patient resting quietly in bed when CM rounded with Dr Velez & nurse Bia. MD informed pt of plan to discharge home with HH & IV abx. RUE PICC secure with dressing intact. Patient previously accepted by [facility] & is established with [facility]. CM informed Rosalva ([phone]) w/[facility] of the pt's discharge plan. Patient stated that she might need assistance with transportation at time of discharge.     1215  HH orders sent to [facility] via [facility]. CM informed the pt's son, Ryan Domingo ([phone]) via phone of the pt's discharge. Son requested a call from the MD with an update. Message sent to Dr Velez informing of above.     Order for "Ambulatory Referral to [facility]" noted.     1515  Patient resting quietly in bed when CM rounded. No family present. CM informed the pt that she does have SNF days left but that [facility] & [facility] do not have bed availability & that she can be accepted to a [place] SNF following discharge. Pt adamantly refused to be admitted to a NH SNF.     CM informed the pt that she has been accepted by [facility] & that [facility] will continue to provide IV abx. Patient verbalized understanding & stated that she has some IV abx at home already.     CM provided the pt with printed information about private duty caregiver companies for hire, information about the Medicaid Community Choice Waiver Program, & NHs in the area.     Patient stated that her son knows she is being discharged today & will provide transportation.     Voicemail message left for both sons, Ryan Domingo ([phone]) & Matias Domingo ([phone]), requesting a return call to discuss pt's discharge, resources provided, & determine what time transportation " "will be provided. Awaiting response.     1715  Signed "Pt Choice Form" placed in the pt's chart & copy of form given to the pt. Pt stated that her son will provide transportation home when he gets off of work today.       Will continue to follow.  "

## 2023-03-09 NOTE — PLAN OF CARE
"  Problem: Adult Inpatient Plan of Care  Goal: Plan of Care Review  Outcome: Ongoing, Progressing     Problem: Adult Inpatient Plan of Care  Goal: Optimal Comfort and Wellbeing  Outcome: Ongoing, Progressing     Problem: Adjustment to Illness (Sepsis/Septic Shock)  Goal: Optimal Coping  Outcome: Ongoing, Progressing     Problem: Syncope  Goal: Absence of Syncopal Symptoms  Outcome: Ongoing, Progressing     Problem: Hypertension Comorbidity  Goal: Blood Pressure in Desired Range  Outcome: Ongoing, Progressing     Problem: Pain Acute (Oncology Care)  Goal: Optimal Pain Control  Outcome: Ongoing, Progressing    .Plan of care reviewed with the patient. Scheduled medicines given and the patient tolerated well. Given two doses of Oxycodone 15 mg for Rt hip pain 9/10 and 8/10. Fall and safety precautions taken and the standard interventions are in place. On Telemetry monitoring with NSR, no true "red alarms' noted, no acute distress reported either in the shift. Advised the patient to call for the assistance. Continued monitoring the patient.      "

## 2023-03-09 NOTE — PLAN OF CARE
Problem: Occupational Therapy  Goal: Occupational Therapy Goal  Description: Goals to be met by: 4/8/2023     Patient will increase functional independence with ADLs by performing:    UE Dressing with Set-up Assistance.  Grooming while seated with Set-up Assistance.  Toileting from bedside commode/toilet with Stand-by Assistance for hygiene and clothing management.   Supine to sit with Modified [place].  Step transfer with Supervision  Toilet transfer to toilet with Supervision.    3/9/2023 1334 by GABRIEL Zelaya/L  Outcome: Ongoing, Progressing  3/9/2023 1334 by GABRIEL Zelaya/L  Outcome: Ongoing, Progressing   Patito TOBIAS Domingo is a 72 y.o. female with a medical diagnosis of Near syncope.  Performance deficits affecting function are weakness, impaired endurance, impaired self care skills, impaired functional mobility, gait instability, impaired balance, decreased upper extremity function, decreased lower extremity function, pain.   Pt found in bed, agreeable to therapy.  Pt reporting B calf pain R>L.  RN notified and reports MD aware and cleared pt to continue with therapy.  Pt required Mod A to transition supine to sit and transferred to chair with Min A using RW.  Pt is progressing towards goals. Continue OT services to address functional goals, progressing as able.   (0) understands/communicates without difficulty

## 2023-03-09 NOTE — PLAN OF CARE
[facility] - Telemetry      HOME HEALTH ORDERS  FACE TO FACE ENCOUNTER    Patient Name: Patito Domingo  YOB: 1950    PCP: Mariela Wei DO   PCP Address: [id number] [place] [address] / Ivonne [place]MAN  PCP Phone Number: [phone]  PCP Fax: [phone]    Encounter Date: 3/7/23    Admit to Home Health    Diagnoses:  Active Hospital Problems    Diagnosis  POA    *Near syncope [R55]  Yes    Chronic diastolic (congestive) heart failure [I50.32]  Yes    Diarrhea [R19.7]  Yes    Staghorn calculus [N20.0]  Yes     Chronic stable      Hypokalemia [E87.6]  Yes    Essential hypertension [I10]  Yes     Chronic     bp well controlled on current       Chronic pain syndrome [G89.4]  Yes    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 [C7B.02]  Yes    Hypomagnesemia [E83.42]  Yes    Recurrent UTI [N39.0]  Yes    Debility [R53.81]  Yes     Will make referral for hh ot pt eval and treat         Resolved Hospital Problems   No resolved problems to display.       Follow Up Appointments:  No future appointments.    Allergies:  Review of patient's allergies indicates:   Allergen Reactions    Contrast media Hives, Itching and Swelling    Epinephrine Anaphylaxis     Can cause  a Carcinoid Crisis    Ibuprofen Hives, Itching and Swelling    Zofran [ondansetron hcl] Itching     And multiple other reactions    Iodinated contrast media     Morphine Other (See Comments)    Sulfa (sulfonamide antibiotics) Hives, Itching and Swelling       Medications: Review discharge medications with patient and family and provide education.    Current Facility-Administered Medications   Medication Dose Route Frequency Provider Last Rate Last Admin    acetaminophen tablet 650 mg  650 mg Oral Q8H PRN Estefani Velez MD        acetaminophen tablet 650 mg  650 mg Oral Q4H PRN Estefani Velez MD        aluminum-magnesium hydroxide-simethicone 200-200-20 mg/5 mL suspension 30 mL  30 mL Oral QID PRN Estefani Velez MD        amLODIPine tablet 5 mg  5  mg Oral Daily Estefani Velez MD        atorvastatin tablet 40 mg  40 mg Oral QHS Estefani Velez MD   40 mg at 03/08/23 2111    capsicum 0.075% topical cream   Topical (Top) TID PRN Estefani Velez MD   Given at 03/08/23 2010    cyanocobalamin tablet 1,000 mcg  1,000 mcg Oral Daily Estefani Velez MD   1,000 mcg at 03/08/23 0855    dextrose 10% bolus 125 mL 125 mL  12.5 g Intravenous PRN Estefani Velez MD        dextrose 10% bolus 250 mL 250 mL  25 g Intravenous PRN Estefani Velez MD        dextrose 40 % gel 15,000 mg  15 g Oral PRN Estefani Velez MD        dextrose 40 % gel 30,000 mg  30 g Oral PRN Estefani Velez MD        enoxaparin injection 40 mg  40 mg Subcutaneous Daily Estefani Velez MD   40 mg at 03/08/23 1651    ertapenem (INVANZ) 1 g in sodium chloride 0.9 % 100 mL IVPB (MB+)  1 g Intravenous Q24H Estefani Velez MD   Stopped at 03/08/23 1100    glucagon (human recombinant) injection 1 mg  1 mg Intramuscular PRN Estefani Velez MD        labetaloL injection 10 mg  10 mg Intravenous Q4H PRN Maximiliano Dang MD   10 mg at 03/08/23 0223    LIDOcaine 5 % patch 1 patch  1 patch Transdermal Daily Estefani Velez MD        loperamide capsule 2 mg  2 mg Oral QID PRN Estefani Velez MD        losartan tablet 100 mg  100 mg Oral Daily Estefani Velez MD        melatonin tablet 6 mg  6 mg Oral Nightly PRN Estefani Velez MD        metoprolol tartrate (LOPRESSOR) tablet 25 mg  25 mg Oral BID Estefani Velez MD   25 mg at 03/08/23 2111    naloxone 0.4 mg/mL injection 0.02 mg  0.02 mg Intravenous PRN Estefani Velez MD        oxyCODONE immediate release tablet 15 mg  15 mg Oral QID PRN Estefani Velez MD   15 mg at 03/09/23 0627    polyethylene glycol packet 17 g  17 g Oral Daily Estefani Velez MD        potassium bicarbonate disintegrating tablet 40 mEq  40 mEq Oral Q4H Estefani Velez MD        simethicone chewable tablet 80 mg  1 tablet Oral QID PRN Estefani Velez MD        sodium chloride 0.9% flush 3 mL  3  mL Intravenous Q12H PRN Estefani Velez MD         Current Discharge Medication List        START taking these medications    Details   potassium bicarbonate (K-LYTE) disintegrating tablet Take 2 tablets (40 mEq total) by mouth once daily. for 10 days  Qty: 20 tablet, Refills: 0           CONTINUE these medications which have CHANGED    Details   losartan (COZAAR) 100 MG tablet Take 1 tablet (100 mg total) by mouth once daily.  Qty: 60 tablet, Refills: 2    Comments: .           CONTINUE these medications which have NOT CHANGED    Details   acetaminophen (TYLENOL) 325 MG tablet Take 650 mg by mouth every 6 (six) hours as needed.      amLODIPine (NORVASC) 5 MG tablet Take 5 mg by mouth once daily.      atorvastatin (LIPITOR) 40 MG tablet Take 1 tablet (40 mg total) by mouth every evening.  Qty: 90 tablet, Refills: 3      cyanocobalamin (VITAMIN B-12) 1000 MCG tablet Take 1 tablet (1,000 mcg total) by mouth once daily.  Qty: 30 tablet, Refills: 5    Associated Diagnoses: Low serum vitamin B12      DAILY PROBIOTIC, S. BOULARDII, 250 mg capsule Take 250 mg by mouth once daily.      diclofenac sodium (VOLTAREN) 1 % Gel Apply 2 g topically 2 (two) times daily as needed.      ertapenem (INVANZ) 1 gram injection Inject 1 g into the vein once daily.      LIDOcaine (LIDODERM) 5 % Place 1 patch onto the skin once daily. Remove & Discard patch within 12 hours or as directed by MD  Qty: 30 patch, Refills: 3    Associated Diagnoses: Chronic pain syndrome; Chronic right shoulder pain      loperamide (IMODIUM) 2 mg capsule Take 2 mg by mouth 4 (four) times daily as needed.      methyl salicylate-menthol 15-10% 15-10 % Crea Apply topically 3 (three) times daily.  Qty: 85 g, Refills: 1      metoprolol tartrate (LOPRESSOR) 25 MG tablet Take 25 mg by mouth 2 (two) times daily.      oxyCODONE (ROXICODONE) 15 MG Tab Take 15 mg by mouth 4 (four) times daily as needed.      phenazopyridine (PYRIDIUM) 200 MG tablet Take 200 mg by mouth 3  (three) times daily as needed.      trolamine salicylate (ASPERCREME) 10 % cream Apply topically as needed.      0.9 % sodium chloride (SODIUM CHLORIDE 0.9%) solution Inject into the vein once daily.      BINAXNOW COVID-19 AG SELF TEST Kit TEST AS DIRECTED TODAY      meclizine (ANTIVERT) 25 mg tablet Take 1 tablet (25 mg total) by mouth 3 (three) times daily as needed for Dizziness.  Qty: 60 tablet, Refills: 3      pantoprazole (PROTONIX) 40 MG tablet TAKE 1 TABLET(40 MG) BY MOUTH EVERY DAY  Qty: 30 tablet, Refills: 5    Associated Diagnoses: Gastroesophageal reflux disease, unspecified whether esophagitis present           STOP taking these medications       nitrofurantoin, macrocrystal-monohydrate, (MACROBID) 100 MG capsule Comments:   Reason for Stopping:             Miscellaneous   Home Infusion Therapy:   SN to perform Infusion Therapy/Central Line Care.  Review Central Line Care & Central Line Flush with patient.    Administer (drug and dose): Ertapenem 1 g Q24    Last dose given: 3/9/2023                     Home dose due: 3/10/2023  End date : 3/16/2023    Scrub the Hub: Prior to accessing the line, always perform a 30 second alcohol scrub  Each lumen of the central line is to be flushed at least daily with 10 mL Normal Saline and 3 mL Heparin flush (10 units/mL)  Skilled Nurse (SN) may draw blood from IV access  Blood Draw Procedure:   - Aspirate at least 5 mL of blood   - Discard   - Obtain specimen   - Change injection cap   - Flush with 20 mL Normal Saline followed by a                 3-5 mL Heparin flush (10 units/mL)  Central :   - Sterile dressing changes are done weekly and as needed.   - Use chlor-hexadine scrub to cleanse site, apply Biopatch to insertion site,       apply securement device dressing   - Injection caps are changed weekly and after EVERY lab draw.   - If sterile gauze is under dressing to control oozing,                 dressing change must be performed every 24  hours until gauze is not needed.    I have seen and examined this patient within the last 30 days. My clinical findings that support the need for the home health skilled services and home bound status are the following:no   Weakness/numbness causing balance and gait disturbance due to Weakness/Debility and Malignancy/Cancer making it taxing to leave home.  Requiring assistive device to leave home due to unsteady gait caused by  Weakness/Debility and Malignancy/Cancer.     Diet:   cardiac diet    Labs:  SN to perform labs:  CBC: Weekly; 1 week(s) and CMP: Weekly; 1 week(s) and Report Lab results to PCP.    Referrals/ Consults  Physical Therapy to evaluate and treat. Evaluate for home safety and equipment needs; Establish/upgrade home exercise program. Perform / instruct on therapeutic exercises, gait training, transfer training, and Range of Motion.  Occupational Therapy to evaluate and treat. Evaluate home environment for safety and equipment needs. Perform/Instruct on transfers, ADL training, ROM, and therapeutic exercises.   to evaluate for community resources/long-range planning.  Aide to provide assistance with personal care, ADLs, and vital signs.  Skilled nursing     Activities:   activity as tolerated    Nursing:   Agency to admit patient within 24 hours of hospital discharge unless specified on physician order or at patient request    SN to complete comprehensive assessment including routine vital signs. Instruct on disease process and s/s of complications to report to MD. Review/verify medication list sent home with the patient at time of discharge  and instruct patient/caregiver as needed. Frequency may be adjusted depending on start of care date.     Skilled nurse to perform up to 3 visits PRN for symptoms related to diagnosis    Notify MD if SBP > 160 or < 90; DBP > 90 or < 50; HR > 120 or < 50; Temp > 101; O2 < 88%    Ok to schedule additional visits based on staff availability and patient  request on consecutive days within the home health episode.    When multiple disciplines ordered:    Start of Care occurs on Sunday - Wednesday schedule remaining discipline evaluations as ordered on separate consecutive days following the start of care.    Thursday SOC -schedule subsequent evaluations Friday and Monday the following week.     Friday - Saturday SOC - schedule subsequent discipline evaluations on consecutive days starting Monday of the following week.    For all post-discharge communication and subsequent orders please contact patient's primary care physician.       Home Health Aide:  Nursing Twice weekly, Physical Therapy Three times weekly, and Occupational Therapy Twice weekly    Wound Care Orders  no    I certify that this patient is confined to her home and needs intermittent skilled nursing care, physical therapy, and occupational therapy.

## 2023-03-09 NOTE — PT/OT/SLP PROGRESS
Physical Therapy Treatment    Patient Name:  Patito Domingo   MRN:  [id number]    Recommendations:     Discharge Recommendations: nursing facility, skilled  Discharge Equipment Recommendations: none  Barriers to discharge: Decreased caregiver support (pt lives alone)    Assessment:     Patito Domingo is a 72 y.o. female admitted with a medical diagnosis of Near syncope.  She presents with the following impairments/functional limitations: weakness, impaired endurance, impaired self care skills, impaired functional mobility, gait instability, impaired balance, decreased upper extremity function, decreased lower extremity function, decreased safety awareness, pain ; pt with fair/good mobility , needing lots of encouragement to walk, has a fear of falling. .    Rehab Prognosis: Good; patient would benefit from acute skilled PT services to address these deficits and reach maximum level of function.    Recent Surgery: * No surgery found *      Plan:     During this hospitalization, patient to be seen 3 x/week to address the identified rehab impairments via gait training, therapeutic activities, therapeutic exercises, neuromuscular re-education and progress toward the following goals:    Plan of Care Expires:  04/07/23    Subjective     Chief Complaint: pain in RLE, and pain behind L knee.  Patient/Family Comments/goals: pt agreeable to session.  Pain/Comfort:  Pain Rating 1: 9/10  Location - Side 1: Right  Location - Orientation 1: lower  Location 1: leg  Pain Addressed 1: Reposition, Distraction, Nurse notified  Pain Rating Post-Intervention 1: 9/10      Objective:     Communicated with nurse prior to session.  Patient found up in chair with telemetry, PICC line upon PT entry to room.     General Precautions: Standard, fall  Orthopedic Precautions: N/A  Braces: N/A  Respiratory Status: Room air     Functional Mobility:  Transfers:     Sit to Stand:  minimum assistance with rolling walker  Gait: amb'd 6' w/ RW and  "Maia, chair following for safety.       AM-PAC 6 CLICK MOBILITY  Turning over in bed (including adjusting bedclothes, sheets and blankets)?: 3  Sitting down on and standing up from a chair with arms (e.g., wheelchair, bedside commode, etc.): 3  Moving from lying on back to sitting on the side of the bed?: 3  Moving to and from a bed to a chair (including a wheelchair)?: 3  Need to walk in hospital room?: 3  Climbing 3-5 steps with a railing?: 1  Basic Mobility Total Score: 16       Treatment & Education:  Pt perf'd sit to stand t/f's multiple times from chair w/ RW and lots of cueing for technique.   Perf'd seated AAROM LAQ"s x 5 ea.   Pt perf'd t/f to commode chair w/ min.A, no AD used. Pt req'd assistance for hygiene and applying cream to bottom.     Patient left up in chair with all lines intact, call button in reach, and nurse notified..    GOALS:   Multidisciplinary Problems       Physical Therapy Goals          Problem: Physical Therapy    Goal Priority Disciplines Outcome Goal Variances Interventions   Physical Therapy Goal     PT, PT/OT Ongoing, Progressing     Description: Goals to be met by: 2023     Patient will increase functional independence with mobility by performin. Supine to sit with Stand-by Assistance  2. Sit to supine with Stand-by Assistance  3. Rolling to Left and Right with Stand-by Assistance.  5. Sit to stand transfer with Stand-by Assistance  6. Bed to chair transfer with Stand-by Assistance using Rolling Walker  7. Gait  x 100 feet with Contact Guard Assistance using Rolling Walker.                          Time Tracking:     PT Received On: 23  PT Start Time: 1116     PT Stop Time: 1141  PT Total Time (min): 25 min     Billable Minutes: Gait Training 12 and Therapeutic Activity 13    Treatment Type: Treatment  PT/PTA: PTA     PTA Visit Number: 1     2023  "

## 2023-03-09 NOTE — HOSPITAL COURSE
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  Workup for near syncope was negative for any specific etiology.  Suspect debility in setting of untreated metastatic neuroendocrine cancer along with recurrent UTIs on antibiotic therapy.  C diff was done for watery diarrhea and was negative.  Echo showed preserved LVEF.  Given allergy to intravenous contrast, a V/Q scan was done that showed low probability of PE.  Right upper extremity ultrasound on which PICC line was present was negative for DVT.  Hypokalemia, hypomagnesemia thought to be in setting of watery diarrhea were replaced.  Suspect diarrhea in setting of neuroendocrine tumor and was started on Imodium p.r.n..  She was afebrile, hemodynamically stable with no leukocytosis.  Was started on potassium supplements at discharge.  Recommend follow-up BMP and continue or hold as appropriate.    Had an extensive discussion with patient's sons Matias and Andres via telephone conference regarding patient's progressive decline, recurrent hospitalizations in the last year.  PT/OT recommended skilled nursing facility for rehab.  Per case management, patient had used most of her skilled nursing days that could be paid by insurance.  She had a few days left that she could have used but declined certain nursing homes.  The facilities she wanted to go to were full and did not have room.  Patient was offered retirement nursing home placement given deconditioning and need for more support which was unavailable at home.  Patient refused retirement placement.  She requested to be discharged home with home health services along with home health aide and any other service that  could help her.  Patient's sons were made aware of her decline and that she would benefit from nursing home placement along with initiation of code status/overall goals of care discussion.  We discussed she has staghorn calculi and is not a surgical candidate, therefore is likely to have recurrent UTIs.  Son Matias noted patient has not been willing to continue therapy for neuroendocrine tumor and is unsure when she was last seen by oncologist.  They expressed understanding.  She is at high-risk of readmission.  Care at home services were ordered - recommend initiating code status/overall goals of care discussion.  Per brief discussion with patient, she wished to be a full code during the hospital stay.  She would benefit from palliative care team seeing her as outpatient.  Home health orders were placed, ertapenem resumed to complete course with end date 03/16/2023 which was confirmed with the home health agency.    Physical Exam  Vitals and nursing note reviewed.   Constitutional:       General: She is not in acute distress. Elderly frail appearing      Comments: RUE PICC in place   HENT:      Mouth: Mucous membranes are moist.   Eyes:      General: No scleral icterus.  Cardiovascular:      Rate and Rhythm: Normal rate and regular rhythm.      Pulses: Normal pulses.      Heart sounds: Normal heart sounds. No murmur heard.  Pulmonary:      Effort: Pulmonary effort is normal. No respiratory distress.      Breath sounds: Normal breath sounds. No wheezing.   Abdominal:      Palpations: Abdomen is soft.      Tenderness: There is no abdominal tenderness.   Musculoskeletal:      Cervical back: Neck supple.      Right lower leg: No edema.      Left lower leg: No edema.   Skin:     General: Skin is warm and dry.      Findings: No bruising or rash.   Neurological:      General: No focal deficit present.      Mental Status: She is alert and oriented to person, place, and time.   Psychiatric:         Mood and Affect: Mood  normal.

## 2023-03-09 NOTE — PROGRESS NOTES
[facility]                    Pharmacy       Discharge Medication Education    Patient ACCEPTED medication education. Pharmacy has provided education on the name, indication, and possible side effects of the medication(s) prescribed, using teach-back method.     The following medications have also been discussed, during this admission.        Medication List        START taking these medications      potassium bicarbonate disintegrating tablet  Commonly known as: K-LYTE  Take 2 tablets (40 mEq total) by mouth once daily. for 10 days            CHANGE how you take these medications      pantoprazole 40 MG tablet  Commonly known as: PROTONIX  TAKE 1 TABLET(40 MG) BY MOUTH EVERY DAY  What changed:   when to take this  reasons to take this            CONTINUE taking these medications      acetaminophen 325 MG tablet  Commonly known as: TYLENOL     amLODIPine 5 MG tablet  Commonly known as: NORVASC     atorvastatin 40 MG tablet  Commonly known as: LIPITOR  Take 1 tablet (40 mg total) by mouth every evening.     BINAXNOW COVID-19 AG SELF TEST Kit  Generic drug: COVID-19 antigen test     cyanocobalamin 1000 MCG tablet  Commonly known as: VITAMIN B-12  Take 1 tablet (1,000 mcg total) by mouth once daily.     DAILY PROBIOTIC (S. BOULARDII) 250 mg capsule  Generic drug: Saccharomyces boulardii     diclofenac sodium 1 % Gel  Commonly known as: VOLTAREN     ertapenem 1 gram injection  Commonly known as: INVANZ     LIDOcaine 5 %  Commonly known as: LIDODERM  Place 1 patch onto the skin once daily. Remove & Discard patch within 12 hours or as directed by MD     loperamide 2 mg capsule  Commonly known as: IMODIUM     losartan 100 MG tablet  Commonly known as: COZAAR  Take 1 tablet (100 mg total) by mouth once daily.     meclizine 25 mg tablet  Commonly known as: ANTIVERT  Take 1 tablet (25 mg total) by mouth 3 (three) times daily as needed for Dizziness.     metoprolol tartrate 25 MG tablet  Commonly known as:  LOPRESSOR     oxyCODONE 15 MG Tab  Commonly known as: ROXICODONE     phenazopyridine 200 MG tablet  Commonly known as: PYRIDIUM     sodium chloride 0.9% solution     trolamine salicylate 10 % cream  Commonly known as: ASPERCREME            STOP taking these medications      nitrofurantoin (macrocrystal-monohydrate) 100 MG capsule  Commonly known as: MACROBID            ASK your doctor about these medications      methyl salicylate-menthol 15-10% 15-10 % Crea  Apply topically 3 (three) times daily.               Where to Get Your Medications        These medications were sent to [facility] [address], [place] [address]      Hours: Mon-Fri, 8a-5:30p Phone: [phone]   losartan 100 MG tablet  potassium bicarbonate disintegrating tablet          Thank you  Lucy Sousa, PharmD  [phone]

## 2023-03-10 ENCOUNTER — TELEPHONE (OUTPATIENT)
Dept: FAMILY MEDICINE | Facility: CLINIC | Age: 73
End: 2023-03-10
Payer: MEDICARE

## 2023-03-10 NOTE — TELEPHONE ENCOUNTER
----- Message from Shelly Joe sent at 3/10/2023  8:28 AM CST -----  Type:  Needs Medical Advice    Who Called:  Pt Son  Would the patient rather a call back or a response via [facility]?  call  Best Call Back Number:  [phone]  Additional Information: Pt son would like a call back regarding pt transportation.  Son states that pt can no longer move on her own and would like to know what next steps to take regarding her going to her appts.

## 2023-03-10 NOTE — TELEPHONE ENCOUNTER
Spoke with pt son. Son wanted to see if there was a sooner appointment available for ER f/u. Son also asked about getting pt in a skilled nursing facility and home health options. Informed son that Dr. Wei can order those for the pt. They would just have to have the specific names of the companies that accept her insurance. Son verbalized understanding. Appt rescheduled to 03/13/2023 at 2 pm.

## 2023-03-10 NOTE — PLAN OF CARE
[facility] - Telemetry  Discharge Final Note    Primary Care Provider: Mariela Wei DO    Expected Discharge Date: 3/9/2023    Final Discharge Note (most recent)       Final Note - 03/10/23 0718          Final Note    Assessment Type Final Discharge Note (P)      Anticipated Discharge Disposition Home-Health Care Svc (P)    [facility]    Hospital Resources/Appts/Education Provided Appointments scheduled and added to AVS (P)    referral sent to [facility]       Post-Acute Status    Post-Acute Authorization Home Health;IV Infusion (P)      Home Health Status Set-up Complete/Auth obtained (P)    [place]    IV Infusion Status Set-up Complete/Auth obtained (P)    [facility]                      Contact Info       MEDICARE & MEDICAID SERVICES        Next Steps: Follow up    Instructions: Medicaid PCA (Please call to see if you qualify for a personal care attendant). Phone#[phone]    [facility]    [address] [facility] [address]   Phone: [phone]       Next Steps: Follow up    Instructions: Home Health Company    [facility]   Specialty: Infusion Provider    [address]  [place] [address]   Phone: [phone]       Next Steps: Follow up    Instructions: [facility]    Mariela Wei DO   Specialty: Family Medicine   Relationship: PCP - General    [address] [facility] [address]   Phone: [phone]       Next Steps: Follow up    Gonzales Bishop MD   Specialty: Hematology and Oncology   Relationship: PCP - Hematology/Oncology    [address] [facility] [address]  [address]  [place] [address]   Phone: [phone]       Next Steps: Follow up    [facility]        Next Steps: Follow up    Instructions: Patient will be notified of nurse [facility] follow up appiontment home visit    [facility]        Next Steps: Follow up    Instructions: will provide  out-patient case management services

## 2023-03-10 NOTE — PROGRESS NOTES
Discharge orders noted. Additional clinical references attached. Patient's discharge instructions given by bedside RN and reviewed via this VN.  Education provided on new medication, diagnosis, and follow-up appointments.  Teach back method used. Patient verbalized understanding. All questions answered. Patient awaiting family for pickup. Bedside nurse updated on patient status and to request transportation to [facility] when ready.   03/09/23 1808   AVS Confirmation   Discharge instructions and AVS given to and reviewed with patient and/or significant other. Yes

## 2023-03-12 LAB — BACTERIA BLD CULT: NORMAL

## 2023-03-12 NOTE — DISCHARGE SUMMARY
[facility] Medicine  Discharge Summary      Patient Name: Patito Domingo  MRN: [id number]  JOSE: [id number]  Patient Class: OP- Observation  Admission Date: 3/7/2023  Hospital Length of Stay: 0 days  Discharge Date and Time: 3/9/2023  6:45 PM  Attending Physician: No att. providers found   Discharging Provider: Estefani Velez MD  Primary Care Provider: Mariela Wei DO    Primary Care Team: Networked reference to record PCT     HPI:   71 y/o F with PMH of metastatic neuroendocrine carcinoma s/p chemoembolization of liver chemo on hold per patient due to current UTI, CVA, HTN, CKD stage 4, diastolic HF, ESBL UTI with bilateral staghorn renal calculi on ertapenem via PICC presented to the ER from home with c/o lightheadedness while ambulating to the bathroom and near syncope. She has been having recurrent near syncopal episodes and falls since recent discharge from a skilled nursing facility. On chart review, [place] [facility] [place] and discharged on 2/23/23 to SNF and subsequent home. She was deemed too high risk to intervene on the staghorn calculi per patient. Currently lives at home alone and states needs more help. Reports having watery diarrhea, 3-4 non bloody stools per day since starting antibiotics. Also reports abdominal discomfort and poor po intake. Denies N/V/ fever.     Work up in the ER unremarkable except for a low potassium which was replaced. VQ scan showed low probability of PE. RUE US negative for DVT. No leukocytosis, lactic acidosis. VS stable, afebrile. She received her dose of ertapenem.   Patient is a poor historian. No family at bedside.       * No surgery found *      Hospital Course:   72-year-old female with PMH of metastatic neuroendocrine carcinoma s/p chemoembolization of liver chemo on hold per patient due to current UTI, CVA, HTN, CKD stage 4, diastolic HF, ESBL UTI with bilateral staghorn renal calculi on ertapenem via PICC presented to the ER  from home with c/o lightheadedness while ambulating to the bathroom and near syncope.  Workup for near syncope was negative for any specific etiology.  Suspect debility in setting of untreated metastatic neuroendocrine cancer along with recurrent UTIs on antibiotic therapy.  C diff was done for watery diarrhea and was negative.  Echo showed preserved LVEF.  Given allergy to intravenous contrast, a V/Q scan was done that showed low probability of PE.  Right upper extremity ultrasound on which PICC line was present was negative for DVT.  Hypokalemia, hypomagnesemia thought to be in setting of watery diarrhea were replaced.  Suspect diarrhea in setting of neuroendocrine tumor and was started on Imodium p.r.n..  She was afebrile, hemodynamically stable with no leukocytosis.  Was started on potassium supplements at discharge.  Recommend follow-up BMP and continue or hold as appropriate.    Had an extensive discussion with patient's sons Maitas and Andres via telephone conference regarding patient's progressive decline, recurrent hospitalizations in the last year.  PT/OT recommended skilled nursing facility for rehab.  Per case management, patient had used most of her skilled nursing days that could be paid by insurance.  She had a few days left that she could have used but declined certain nursing homes.  The facilities she wanted to go to were full and did not have room.  Patient was offered alf nursing home placement given deconditioning and need for more support which was unavailable at home.  Patient refused alf placement.  She requested to be discharged home with home health services along with home health aide and any other service that could help her.  Patient's sons were made aware of her decline and that she would benefit from nursing home placement along with initiation of code status/overall goals of care discussion.  We discussed she has staghorn calculi and is not a surgical candidate, therefore  is likely to have recurrent UTIs.  Son Matias noted patient has not been willing to continue therapy for neuroendocrine tumor and is unsure when she was last seen by oncologist.  They expressed understanding.  She is at high-risk of readmission.  Care at home services were ordered - recommend initiating code status/overall goals of care discussion.  Per brief discussion with patient, she wished to be a full code during the hospital stay.  She would benefit from palliative care team seeing her as outpatient.  Home health orders were placed, ertapenem resumed to complete course with end date 03/16/2023 which was confirmed with the home health agency.    Physical Exam  Vitals and nursing note reviewed.   Constitutional:       General: She is not in acute distress. Elderly frail appearing      Comments: RUE PICC in place   HENT:      Mouth: Mucous membranes are moist.   Eyes:      General: No scleral icterus.  Cardiovascular:      Rate and Rhythm: Normal rate and regular rhythm.      Pulses: Normal pulses.      Heart sounds: Normal heart sounds. No murmur heard.  Pulmonary:      Effort: Pulmonary effort is normal. No respiratory distress.      Breath sounds: Normal breath sounds. No wheezing.   Abdominal:      Palpations: Abdomen is soft.      Tenderness: There is no abdominal tenderness.   Musculoskeletal:      Cervical back: Neck supple.      Right lower leg: No edema.      Left lower leg: No edema.   Skin:     General: Skin is warm and dry.      Findings: No bruising or rash.   Neurological:      General: No focal deficit present.      Mental Status: She is alert and oriented to person, place, and time.   Psychiatric:         Mood and Affect: Mood normal.        Goals of Care Treatment Preferences:  Code Status: Full Code      Consults:       Final Active Diagnoses:    Diagnosis Date Noted POA    PRINCIPAL PROBLEM:  Near syncope [R55] 04/03/2022 Yes    Chronic diastolic (congestive) heart failure [I50.32]  03/02/2022 Yes    Diarrhea [R19.7] 04/11/2021 Yes    Staghorn calculus [N20.0] 10/15/2020 Yes    Hypokalemia [E87.6] 01/14/2020 Yes    Essential hypertension [I10] 11/19/2019 Yes     Chronic    Chronic pain syndrome [G89.4] 12/02/2018 Yes    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 [C7B.02] 11/30/2018 Yes    Hypomagnesemia [E83.42] 04/29/2018 Yes    Recurrent UTI [N39.0] 04/28/2018 Yes    Debility [R53.81] 11/29/2016 Yes      Problems Resolved During this Admission:       Discharged Condition: stable    Disposition: Home or Self Care    Follow Up:   Follow-up Information     MEDICARE & MEDICAID SERVICES Follow up.    Why: Medicaid PCA (Please call to see if you qualify for a personal care attendant). Phone#[phone]           [facility] Follow up.    Why: [facility]  Contact information:  [address] [address] [phone]           [facility] Follow up.    Specialty: Infusion Provider  Why: [facility]  Contact information:  [address]  [place] [address]  [phone]             Mariela Wei DO .    Specialty: Family Medicine  Contact information:  [address]  [place] [address]  [phone]             Gonzales Bishop MD .    Specialty: Hematology and Oncology  Contact information:  [address] [facility] [address]  [address]  [place] [address]  [phone]             [facility] Follow up.    Why: Patient will be notified of [facility] follow up appiontment home visit           [facility] Follow up.    Why: will provide out-patient case management services                     Patient Instructions:      Ambulatory referral/consult to Outpatient Case Management   Referral Priority: Routine Referral Type: Consultation   Referral Reason: Specialty Services Required   Number of Visits Requested: 1     Ambulatory referral/consult to [facility] -  Medical & Palliative   Standing Status: Future   Referral Priority: Routine Referral Type: Consultation   Referral Reason: Specialty Services Required   Number of Visits Requested: 1       Significant Diagnostic Studies: Labs: BMP: No results for input(s): GLU, NA, K, CL, CO2, BUN, CREATININE, CALCIUM, MG in the last 48 hours. and CBC No results for input(s): WBC, HGB, HCT, PLT in the last 48 hours.    Pending Diagnostic Studies:     None         Medications:  Reconciled Home Medications:      Medication List      START taking these medications    potassium bicarbonate disintegrating tablet  Commonly known as: K-LYTE  Take 2 tablets (40 mEq total) by mouth once daily. for 10 days        CHANGE how you take these medications    pantoprazole 40 MG tablet  Commonly known as: PROTONIX  TAKE 1 TABLET(40 MG) BY MOUTH EVERY DAY  What changed:   · when to take this  · reasons to take this        CONTINUE taking these medications    acetaminophen 325 MG tablet  Commonly known as: TYLENOL  Take 650 mg by mouth every 6 (six) hours as needed.     amLODIPine 5 MG tablet  Commonly known as: NORVASC  Take 5 mg by mouth once daily.     atorvastatin 40 MG tablet  Commonly known as: LIPITOR  Take 1 tablet (40 mg total) by mouth every evening.     BINAXNOW COVID-19 AG SELF TEST Kit  Generic drug: COVID-19 antigen test  TEST AS DIRECTED TODAY     cyanocobalamin 1000 MCG tablet  Commonly known as: VITAMIN B-12  Take 1 tablet (1,000 mcg total) by mouth once daily.     DAILY PROBIOTIC (S. BOULARDII) 250 mg capsule  Generic drug: Saccharomyces boulardii  Take 250 mg by mouth once daily.     diclofenac sodium 1 % Gel  Commonly known as: VOLTAREN  Apply 2 g topically 2 (two) times daily as needed.     ertapenem 1 gram injection  Commonly known as: INVANZ  Inject 1 g into the vein once daily.     LIDOcaine 5 %  Commonly known as: LIDODERM  Place 1 patch onto the skin once daily. Remove & Discard patch within 12 hours or as directed by MD      loperamide 2 mg capsule  Commonly known as: IMODIUM  Take 2 mg by mouth 4 (four) times daily as needed.     losartan 100 MG tablet  Commonly known as: COZAAR  Take 1 tablet (100 mg total) by mouth once daily.     meclizine 25 mg tablet  Commonly known as: ANTIVERT  Take 1 tablet (25 mg total) by mouth 3 (three) times daily as needed for Dizziness.     metoprolol tartrate 25 MG tablet  Commonly known as: LOPRESSOR  Take 25 mg by mouth 2 (two) times daily.     oxyCODONE 15 MG Tab  Commonly known as: ROXICODONE  Take 15 mg by mouth 4 (four) times daily as needed.     phenazopyridine 200 MG tablet  Commonly known as: PYRIDIUM  Take 200 mg by mouth 3 (three) times daily as needed.     sodium chloride 0.9% solution  Inject into the vein once daily.     trolamine salicylate 10 % cream  Commonly known as: ASPERCREME  Apply topically as needed.        STOP taking these medications    nitrofurantoin (macrocrystal-monohydrate) 100 MG capsule  Commonly known as: MACROBID        ASK your doctor about these medications    methyl salicylate-menthol 15-10% 15-10 % Crea  Apply topically 3 (three) times daily.            Indwelling Lines/Drains at time of discharge:   Lines/Drains/Airways     None                 Time spent on the discharge of patient: 40 minutes         Estefani Velez MD  Department of Hospital Medicine  [facility]

## 2023-03-13 ENCOUNTER — OFFICE VISIT (OUTPATIENT)
Dept: FAMILY MEDICINE | Facility: CLINIC | Age: 73
End: 2023-03-13
Payer: MEDICARE

## 2023-03-13 VITALS
WEIGHT: 176.94 LBS | SYSTOLIC BLOOD PRESSURE: 130 MMHG | BODY MASS INDEX: 28.44 KG/M2 | DIASTOLIC BLOOD PRESSURE: 68 MMHG | OXYGEN SATURATION: 98 % | HEIGHT: 66 IN | TEMPERATURE: 98 F | HEART RATE: 86 BPM

## 2023-03-13 DIAGNOSIS — N39.0 RECURRENT UTI: ICD-10-CM

## 2023-03-13 DIAGNOSIS — E87.6 HYPOKALEMIA: ICD-10-CM

## 2023-03-13 DIAGNOSIS — N20.0 STAGHORN CALCULUS: ICD-10-CM

## 2023-03-13 DIAGNOSIS — Z09 HOSPITAL DISCHARGE FOLLOW-UP: Primary | ICD-10-CM

## 2023-03-13 DIAGNOSIS — Z16.24 MULTIPLE DRUG RESISTANT ORGANISM (MDRO) CULTURE POSITIVE: ICD-10-CM

## 2023-03-13 DIAGNOSIS — R53.81 DEBILITY: ICD-10-CM

## 2023-03-13 PROCEDURE — 3075F SYST BP GE 130 - 139MM HG: CPT | Mod: HCNC,CPTII,S$GLB, | Performed by: STUDENT IN AN ORGANIZED HEALTH CARE EDUCATION/TRAINING PROGRAM

## 2023-03-13 PROCEDURE — 99215 PR OFFICE/OUTPT VISIT, EST, LEVL V, 40-54 MIN: ICD-10-PCS | Mod: HCNC,S$GLB,, | Performed by: STUDENT IN AN ORGANIZED HEALTH CARE EDUCATION/TRAINING PROGRAM

## 2023-03-13 PROCEDURE — 99999 PR PBB SHADOW E&M-EST. PATIENT-LVL V: ICD-10-PCS | Mod: PBBFAC,HCNC,, | Performed by: STUDENT IN AN ORGANIZED HEALTH CARE EDUCATION/TRAINING PROGRAM

## 2023-03-13 PROCEDURE — 3078F DIAST BP <80 MM HG: CPT | Mod: HCNC,CPTII,S$GLB, | Performed by: STUDENT IN AN ORGANIZED HEALTH CARE EDUCATION/TRAINING PROGRAM

## 2023-03-13 PROCEDURE — 1160F PR REVIEW ALL MEDS BY PRESCRIBER/CLIN PHARMACIST DOCUMENTED: ICD-10-PCS | Mod: HCNC,CPTII,S$GLB, | Performed by: STUDENT IN AN ORGANIZED HEALTH CARE EDUCATION/TRAINING PROGRAM

## 2023-03-13 PROCEDURE — 1160F RVW MEDS BY RX/DR IN RCRD: CPT | Mod: HCNC,CPTII,S$GLB, | Performed by: STUDENT IN AN ORGANIZED HEALTH CARE EDUCATION/TRAINING PROGRAM

## 2023-03-13 PROCEDURE — 3288F FALL RISK ASSESSMENT DOCD: CPT | Mod: HCNC,CPTII,S$GLB, | Performed by: STUDENT IN AN ORGANIZED HEALTH CARE EDUCATION/TRAINING PROGRAM

## 2023-03-13 PROCEDURE — 1159F PR MEDICATION LIST DOCUMENTED IN MEDICAL RECORD: ICD-10-PCS | Mod: HCNC,CPTII,S$GLB, | Performed by: STUDENT IN AN ORGANIZED HEALTH CARE EDUCATION/TRAINING PROGRAM

## 2023-03-13 PROCEDURE — 99215 OFFICE O/P EST HI 40 MIN: CPT | Mod: HCNC,S$GLB,, | Performed by: STUDENT IN AN ORGANIZED HEALTH CARE EDUCATION/TRAINING PROGRAM

## 2023-03-13 PROCEDURE — 3078F PR MOST RECENT DIASTOLIC BLOOD PRESSURE < 80 MM HG: ICD-10-PCS | Mod: HCNC,CPTII,S$GLB, | Performed by: STUDENT IN AN ORGANIZED HEALTH CARE EDUCATION/TRAINING PROGRAM

## 2023-03-13 PROCEDURE — 3075F PR MOST RECENT SYSTOLIC BLOOD PRESS GE 130-139MM HG: ICD-10-PCS | Mod: HCNC,CPTII,S$GLB, | Performed by: STUDENT IN AN ORGANIZED HEALTH CARE EDUCATION/TRAINING PROGRAM

## 2023-03-13 PROCEDURE — 3008F PR BODY MASS INDEX (BMI) DOCUMENTED: ICD-10-PCS | Mod: HCNC,CPTII,S$GLB, | Performed by: STUDENT IN AN ORGANIZED HEALTH CARE EDUCATION/TRAINING PROGRAM

## 2023-03-13 PROCEDURE — 1125F PR PAIN SEVERITY QUANTIFIED, PAIN PRESENT: ICD-10-PCS | Mod: HCNC,CPTII,S$GLB, | Performed by: STUDENT IN AN ORGANIZED HEALTH CARE EDUCATION/TRAINING PROGRAM

## 2023-03-13 PROCEDURE — 99999 PR PBB SHADOW E&M-EST. PATIENT-LVL V: CPT | Mod: PBBFAC,HCNC,, | Performed by: STUDENT IN AN ORGANIZED HEALTH CARE EDUCATION/TRAINING PROGRAM

## 2023-03-13 PROCEDURE — 1125F AMNT PAIN NOTED PAIN PRSNT: CPT | Mod: HCNC,CPTII,S$GLB, | Performed by: STUDENT IN AN ORGANIZED HEALTH CARE EDUCATION/TRAINING PROGRAM

## 2023-03-13 PROCEDURE — 4010F ACE/ARB THERAPY RXD/TAKEN: CPT | Mod: HCNC,CPTII,S$GLB, | Performed by: STUDENT IN AN ORGANIZED HEALTH CARE EDUCATION/TRAINING PROGRAM

## 2023-03-13 PROCEDURE — 4010F PR ACE/ARB THEARPY RXD/TAKEN: ICD-10-PCS | Mod: HCNC,CPTII,S$GLB, | Performed by: STUDENT IN AN ORGANIZED HEALTH CARE EDUCATION/TRAINING PROGRAM

## 2023-03-13 PROCEDURE — 3288F PR FALLS RISK ASSESSMENT DOCUMENTED: ICD-10-PCS | Mod: HCNC,CPTII,S$GLB, | Performed by: STUDENT IN AN ORGANIZED HEALTH CARE EDUCATION/TRAINING PROGRAM

## 2023-03-13 PROCEDURE — 1100F PTFALLS ASSESS-DOCD GE2>/YR: CPT | Mod: HCNC,CPTII,S$GLB, | Performed by: STUDENT IN AN ORGANIZED HEALTH CARE EDUCATION/TRAINING PROGRAM

## 2023-03-13 PROCEDURE — 3008F BODY MASS INDEX DOCD: CPT | Mod: HCNC,CPTII,S$GLB, | Performed by: STUDENT IN AN ORGANIZED HEALTH CARE EDUCATION/TRAINING PROGRAM

## 2023-03-13 PROCEDURE — 1100F PR PT FALLS ASSESS DOC 2+ FALLS/FALL W/INJURY/YR: ICD-10-PCS | Mod: HCNC,CPTII,S$GLB, | Performed by: STUDENT IN AN ORGANIZED HEALTH CARE EDUCATION/TRAINING PROGRAM

## 2023-03-13 PROCEDURE — 1159F MED LIST DOCD IN RCRD: CPT | Mod: HCNC,CPTII,S$GLB, | Performed by: STUDENT IN AN ORGANIZED HEALTH CARE EDUCATION/TRAINING PROGRAM

## 2023-03-13 RX ORDER — POTASSIUM CHLORIDE 750 MG/1
10 TABLET, EXTENDED RELEASE ORAL DAILY
Qty: 90 TABLET | Refills: 3 | Status: SHIPPED | OUTPATIENT
Start: 2023-03-13 | End: 2023-09-26 | Stop reason: CLARIF

## 2023-03-13 NOTE — PROGRESS NOTES
Subjective:      Patient ID: Patito Domingo is a 72 y.o. female.    Chief Complaint: Hospital Follow Up    Hospital f/u   Pt with UTI; on IV abx x 6 weeks  Has urology f/u  + kidney stones nothing can be done due to her high risk for surgery   She is weak   Has 1 more day of IV abx; PICC in place; hopeful this will be removed tomorrow   Family wants her to at least get inpt rehab possibly more   They are not able to care for her 24/7 and they are worried about her being home alone  Open to case management referral   They are under impression there is not much that is left to do to treat these recurrent UTIs      Review of Systems   Constitutional:  Positive for fatigue.   Gastrointestinal:  Positive for abdominal distention, abdominal pain and diarrhea.   Musculoskeletal:  Positive for arthralgias.   Neurological:  Positive for dizziness, tremors and weakness.   Psychiatric/Behavioral:  Positive for dysphoric mood and sleep disturbance. The patient is nervous/anxious.       Objective:     Vitals:    03/13/23 1414   BP: 130/68   Pulse: 86   Temp: 97.5 °F (36.4 °C)      Physical Exam  Vitals reviewed.   Constitutional:       Appearance: Normal appearance. She is normal weight.   HENT:      Head: Normocephalic and atraumatic.   Eyes:      Conjunctiva/sclera: Conjunctivae normal.   Cardiovascular:      Rate and Rhythm: Normal rate and regular rhythm.      Heart sounds: Normal heart sounds.   Pulmonary:      Effort: Pulmonary effort is normal.      Breath sounds: Normal breath sounds.   Abdominal:      Palpations: Abdomen is soft.      Tenderness: There is no abdominal tenderness.   Musculoskeletal:         General: Normal range of motion.      Cervical back: Normal range of motion.      Right lower leg: No edema.      Left lower leg: No edema.   Neurological:      General: No focal deficit present.      Mental Status: She is alert and oriented to person, place, and time.      Motor: Weakness present.   Psychiatric:          Mood and Affect: Mood normal.         Behavior: Behavior normal.      Assessment:         1. Hospital discharge follow-up    2. Recurrent UTI    3. Multiple drug resistant organism (MDRO) culture positive    4. Debility    5. Staghorn calculus    6. Hypokalemia            Plan:   1. Hospital discharge follow-up    2. Recurrent UTI  - Ambulatory referral/consult to Outpatient Case Management    3. Multiple drug resistant organism (MDRO) culture positive    4. Debility  - Ambulatory referral/consult to Outpatient Case Management    5. Staghorn calculus  - Ambulatory referral/consult to Outpatient Case Management    6. Hypokalemia  - potassium chloride (KLOR-CON) 10 MEQ TbSR; Take 1 tablet (10 mEq total) by mouth once daily.  Dispense: 90 tablet; Refill: 3       Refer to case management for inpt rehab options to help regain strength   Keep routine f/u with urology   Refill potassium for low numbers   RTC PRN       Sarah A. Champagne [facility] Medicine   3/13/23

## 2023-03-14 ENCOUNTER — TELEPHONE (OUTPATIENT)
Dept: PHARMACY | Facility: CLINIC | Age: 73
End: 2023-03-14
Payer: MEDICARE

## 2023-03-20 ENCOUNTER — TELEPHONE (OUTPATIENT)
Dept: FAMILY MEDICINE | Facility: CLINIC | Age: 73
End: 2023-03-20
Payer: MEDICARE

## 2023-03-20 NOTE — TELEPHONE ENCOUNTER
----- Message from Amy Perkins sent at 3/20/2023  3:04 PM CDT -----  Regarding: Ryan Magana Son [phone]  Contact: Cecel Patients Son [phone]  Who Called: Cecel Patients Son [phone]    Calling to talk to nurse in regards to see if Dr. Wei put in the orders for Case Management.

## 2023-03-20 NOTE — TELEPHONE ENCOUNTER
Spoke with pt's son. Son wanted to know the status of case management calling them. Informed pt that they usually call the pt to set up everything. Gave pt's son number to social work/case management dept.

## 2023-03-21 RX ORDER — LOPERAMIDE HYDROCHLORIDE 2 MG/1
2 CAPSULE ORAL 2 TIMES DAILY PRN
Qty: 30 CAPSULE | Refills: 1 | Status: SHIPPED | OUTPATIENT
Start: 2023-03-21 | End: 2024-01-03

## 2023-03-21 NOTE — TELEPHONE ENCOUNTER
----- Message from Champ Mitchell sent at 3/21/2023 11:33 AM CDT -----  Contact: Pt  .Type:  RX Refill Request    Who Called: Pt  Refill or New Rx:refill  RX Name and Strength:loperamide (IMODIUM) 2 mg capsule  Preferred Pharmacy with phone number:[id number] [place] [id number] [address] AT [facility] [address]  Local or Mail Order:local  Ordering Provider:Champagne  Would the patient rather a call back or a response via [facility]? call  Best Call Back Number:[phone]  Additional Information:

## 2023-03-23 ENCOUNTER — EXTERNAL HOME HEALTH (OUTPATIENT)
Dept: HOME HEALTH SERVICES | Facility: HOSPITAL | Age: 73
End: 2023-03-23
Payer: MEDICARE

## 2023-03-27 ENCOUNTER — OUTPATIENT CASE MANAGEMENT (OUTPATIENT)
Dept: ADMINISTRATIVE | Facility: OTHER | Age: 73
End: 2023-03-27
Payer: MEDICARE

## 2023-03-27 ENCOUNTER — TELEPHONE (OUTPATIENT)
Dept: FAMILY MEDICINE | Facility: CLINIC | Age: 73
End: 2023-03-27
Payer: MEDICARE

## 2023-03-27 ENCOUNTER — TELEPHONE (OUTPATIENT)
Dept: HOME HEALTH SERVICES | Facility: CLINIC | Age: 73
End: 2023-03-27
Payer: MEDICARE

## 2023-03-27 NOTE — TELEPHONE ENCOUNTER
----- Message from Bella Álvarez RN sent at 3/27/2023  1:25 PM CDT -----  Regarding: Outpatient Care Management  3/27/2023     Meghna Wei DO:    Your patient [id number] Patito Domingo  was  referred to [facility] by Provider Referral.      My name is Bella Álvarez RN, Care Manager.  At times, it may be necessary to have a  involved in the coordination of care, their names will be added to the Care Team where appropriate.    In our enrollment encounter, the following needs were identified:  Care Coordination and Self-care Management Education    All needs and services provided by [facility] and other care team members will be communicated to you via your Resource Pool.      Our documentation can be readily accessed in the EMR using the following tabs: Chart review -> Episodes: High Risk.     It is our goal to provide holistic care, if at any time you feel other areas of concern need to be addressed, please communicate with me by [employer] messaging.      Respectfully,      Bella Álvarez RN

## 2023-03-27 NOTE — LETTER
March 27, 2023           Dear Ms. Patito Domingo ,     Welcome to [facility].  It was a pleasure talking with you today.  My name is Bella Álvarez. I look forward to working with you as your .  My goal is to help you function at the healthiest and highest level possible.  You can contact me directly at [phone].    As an [facility] patient with [employer], some of the services we may be able to provide include:      Development of an individualized care plan with a Registered Nurse    Connection with a    Connection with available resources and services     Coordinate communication among your care team members    Provide coaching and education    Help you understand your doctors treatment plan   Help you obtain information about your insurance coverage.     All services provided by [facility] and other care team members are coordinated with and communicated to your primary care team.    As part of your enrollment, you will be receiving education materials and more information about these services in your [facility] account, by phone or through the mail.  If you do not wish to participate or receive information, please contact our office at [phone].      Sincerely,      Bella Álvarez, RN  [facility]   Out-patient RN Complex Care Manager                   Patient Education        Neuroendocrine Tumor   About this topic   Neuroendocrine tumors, or NETs, are abnormal growths of cells somewhere in your neuroendocrine system. The neuroendocrine system are the parts of your body that release hormones after a signal from your nervous system. Hormones are chemicals that are released by organs in one part of the body, travel through the blood, and have effects on other organs. An example of a hormone is insulin, which helps your body control its blood sugar. The neuroendocrine system has parts all across  your body. NETs may or may not be cancer. They can be slow growing or fast growing. Fast growing tumors are more likely to spread to other places.       What are the causes?   Doctors do not know exactly what causes NETs.  What can make this more likely to happen?    Being age 60 or older   Having certain genes that may cause other cancers  What are the main signs?   The signs for a NET are based on where in the body the NET is and what organs are involved. Your signs may be based on the kind of hormone that is affected. Some NETs do not cause any signs at all.   Belly pain, swelling, or cramps   Upset stomach or loose stools   Feeling weak or tired   High or low blood sugar   Bright red or dark blood when you throw up or in your stool   Weight loss   Yellowing of the skin   Headaches   Rash   Blood clots  How does the doctor diagnose this health problem?   The doctor will ask questions about your history and will do an exam. The doctor may order:   Lab tests   MRI or CT scan   Ultrasound   X-rays   Biopsy  How does the doctor treat this health problem?   Your treatment will be based on the type of cancer cells that are found. Treatment will also be based on the stage and if the cancer has spread. Your treatment may include surgery, radiation, or drugs called chemo.  You may need surgery to:   Take out any tumor or explore where the cancer is in your belly   Take out lymph nodes that have cancer cells or to check for cancer cells  What lifestyle changes are needed?    Eat healthy foods. Proper nutrition and healthy eating will help you recover. Ask your dietitian to help you make a balanced eating plan. Drink at least 8 glasses of water each day.   Stop smoking cigarettes.   Avoid drinking beer, wine, and mixed drinks (alcohol) and caffeine.   Physical activity or exercise can lower fatigue and weakness. Exercise builds up your energy and helps strengthen your body.   Get lots of rest and  sleep. Try to take naps if needed.  What drugs may be needed?   The doctor may order drugs to:   Kill cancer cells   Control pain   Prevent infection   Help with side effects like upset stomach and throwing up   Help control your hormones  Helpful tips    Your doctor may ask you to make visits to the office to check on your progress. Be sure to keep your visits.   Join a support group. Support can help you understand and deal with your illness.  Where can I learn more?   American Society of Clinical Oncology  http://www.cancer.net/cancer-types/neuroendocrine-tumor/overview   [facility] Cancer Support  http://www.maria elena.org.uk/Cancerinformation/Cancertypes/Neuroendocrine/Overview.aspx   [employer]  https://www.cancer.gov/types/pancreatic/patient/pnet-treatment-pdq   [employer]  https://www.cancer.gov/types/yy-opoggtzch-zjjzch/patient/gi-carcinoid-treatment-pdq   Last Reviewed Date   2021-09-22  Consumer Information Use and Disclaimer   This information is not specific medical advice and does not replace information you receive from your health care provider. This is only a brief summary of general information. It does NOT include all information about conditions, illnesses, injuries, tests, procedures, treatments, therapies, discharge instructions or life-style choices that may apply to you. You must talk with your health care provider for complete information about your health and treatment options. This information should not be used to decide whether or not to accept your health care providers advice, instructions or recommendations. Only your health care provider has the knowledge and training to provide advice that is right for you.  Copyright   Copyright © 2021 UpToDate, Inc. and its affiliates and/or licensors. All rights reserved.

## 2023-03-27 NOTE — TELEPHONE ENCOUNTER
Called patient to assist with rescheduling care at home visit since she went to rehab facility per NP request.  Unable to reach patient but left voicemail with contact number.

## 2023-03-27 NOTE — PROGRESS NOTES
"Outpatient Care Management  Initial Patient Assessment    Patient: Patito Domingo  MRN: [id number]  Date of Service: 03/27/2023  Completed by: Bella Álvarez RN  Referral Date: 03/13/2023  Program: High Risk  Status: Ongoing  Effective Dates: 3/27/2023 - present  Responsible Staff: Bella Álvarez RN        Reason for Visit   Patient presents with    OPCM Chart Review    OPCM Enrollment Call    Initial Assessment    Plan Of Care    PHQ-9     03/27/23       Brief Summary:  Patito Domingo was referred by PCP, Mariela Wei DO for Debility, Recurrent UTI, Staghorn calculus. Patient qualifies for program based on risk score of 95.3%.   Active problem list, medical, surgical and social history reviewed. Active comorbidities include Neuroendocrine Tumor. Areas of need identified by patient include Physical Limitation as evidenced by decline in ability to perform ADL's. Patient has not pursued continued follow up care with hemo/onc specialist.   Complex care plan created with patient/caregiver input. By next encounter, patient agrees to contact oncologist Dr. Perez to schedule an appointment .   Next steps: OPCM sending educational information to patient "Neuroendocrine Tumor". Referring patient to OPCM MARIANO Martinez for assistance with PCA services through Medicaid, see if patient may qualify for life alert through [employer].Sending message to KINSEY Bright to request scheduling appointment with patient (patient has been [facility] home/palliative. Follow up with patient to inquire if she was able to contact Oncologist Dr. Perez to set up appointment     "

## 2023-03-28 ENCOUNTER — OUTPATIENT CASE MANAGEMENT (OUTPATIENT)
Dept: ADMINISTRATIVE | Facility: OTHER | Age: 73
End: 2023-03-28
Payer: MEDICARE

## 2023-03-28 ENCOUNTER — TELEPHONE (OUTPATIENT)
Dept: HOME HEALTH SERVICES | Facility: CLINIC | Age: 73
End: 2023-03-28
Payer: MEDICARE

## 2023-03-28 DIAGNOSIS — C7B.00 METASTATIC CARCINOID TUMOR: Primary | Chronic | ICD-10-CM

## 2023-03-28 DIAGNOSIS — N20.0 STAGHORN CALCULUS: ICD-10-CM

## 2023-03-28 DIAGNOSIS — N39.0 RECURRENT UTI: ICD-10-CM

## 2023-03-28 NOTE — TELEPHONE ENCOUNTER
----- Message from Amy Bright NP sent at 3/27/2023  1:10 PM CDT -----  Can you help this pt schedule an appt.  I had lost her to follow up as she went into rehab  She was previously in [facility]    ----- Message -----  From: Bella Álvarez RN  Sent: 3/27/2023   1:03 PM CDT  To: KINSEY Holder,    I work with [facility]. Ms. Domingo was referred to [facility] and has been enrolled with our program. Patient has also been referred to [facility]. I noticed you have previously seen this patient. She has had multiple ED visits, inpatient admission 03/07- 03/09, SNF stay in February. Patient has been having recurrent UTI's, lightheadedness, near syncope, episodes of watery diarrhea. Providers encouraged patient to consider NH placement as she lives alone having had time completing ADL's. Patient declined.      Would it be possible to schedule an appointment with Ms. Domingo, if you feel this is appropriate.     Respectfully,    Bella Álvarez RN  [facility]  [email or web address]  Tel:[phone]

## 2023-03-28 NOTE — PROGRESS NOTES
Outpatient Care Management   - High Risk Patient Assessment    Patient: Patito Domingo  MRN:  [id number]  Date of Service:  3/28/2023  Completed by:  Eleanor Martinez LCSW  Referral Date: 03/13/2023    Reason for Visit   Patient presents with    [facility] Chart Review    Plan Of Care    Social Work Assessment - High Risk     3/28/2023  Brief Summary:  received a referral from OPCM RN Bella Álvarez for the following patient identified psycho-social needs, Ms. Domingo needs help with checking the status of her application with Medicaid for PCA services. Patient stated she applied in 2020, has not been contacted.Pt is in need of Life Alert with [employer] . Care plan was created in collaboration with patient/caregiver input. SW will follow up in one week or PRN.

## 2023-04-03 ENCOUNTER — OUTPATIENT CASE MANAGEMENT (OUTPATIENT)
Dept: ADMINISTRATIVE | Facility: OTHER | Age: 73
End: 2023-04-03
Payer: MEDICARE

## 2023-04-04 ENCOUNTER — OUTPATIENT CASE MANAGEMENT (OUTPATIENT)
Dept: ADMINISTRATIVE | Facility: OTHER | Age: 73
End: 2023-04-04
Payer: MEDICARE

## 2023-04-05 NOTE — PROGRESS NOTES
Outpatient Care Management   - Care Plan Follow Up    Patient: Patito Domingo  MRN:  [id number]  Date of Service:  4/4/2023  Completed by:  Eleanor Martinez LCSW  Referral Date: 03/13/2023    Reason for Visit   Patient presents with    OPCM Chart Review    Update Plan Of Care    OPCM SW FOLLOW-UP       4/4/2023    Brief Summary: SW telephoned pt for a follow up. Pt has contacted provider discussed at the last contact. SW will follow up in two weeks or PRN.     Complex Care Plan     Care plan was discussed and completed today with input from patient and/or caregiver.    Patient Instructions     No follow-ups on file.

## 2023-04-13 RX ORDER — LOSARTAN POTASSIUM 100 MG/1
100 TABLET ORAL DAILY
Qty: 90 TABLET | Refills: 3 | Status: SHIPPED | OUTPATIENT
Start: 2023-04-13 | End: 2023-09-26 | Stop reason: CLARIF

## 2023-04-13 NOTE — TELEPHONE ENCOUNTER
Pt requesting refills for losartan 100 mg. Medication was prescribed to pt in Hospital on 03/09/2023 but was sent to the wrong pharmacy. Pt would like rx to be sent to [facility] pharmacy. Please advise.

## 2023-04-13 NOTE — TELEPHONE ENCOUNTER
No new care gaps identified.  [facility] Embedded Care Gaps. Reference number: [id number]. 4/13/2023   10:11:20 AM CDT

## 2023-04-13 NOTE — TELEPHONE ENCOUNTER
----- Message from Shelly Joe sent at 4/13/2023  9:26 AM CDT -----  Type:  RX Refill Request    Who Called:  Pt  Refill or New Rx: Refill   RX Name and Strength: losartan (COZAAR) 100 MG tablet [[id number]]  How is the patient currently taking it? (ex. 1XDay):Take 1 tablet (100 mg total) by mouth once daily. - Oral  Is this a 30 day or 90 day RX:60   Preferred Pharmacy with phone number:[facility] - [facility] [address]   Phone:  [phone]  Local or Mail Order: Mail   Ordering Provider: Dr. Wei  Would the patient rather a call back or a response via [facility]?  call  Best Call Back Number:[phone]  Additional Information:

## 2023-04-18 ENCOUNTER — DOCUMENT SCAN (OUTPATIENT)
Dept: HOME HEALTH SERVICES | Facility: HOSPITAL | Age: 73
End: 2023-04-18
Payer: MEDICARE

## 2023-04-19 ENCOUNTER — OUTPATIENT CASE MANAGEMENT (OUTPATIENT)
Dept: ADMINISTRATIVE | Facility: OTHER | Age: 73
End: 2023-04-19
Payer: MEDICARE

## 2023-04-20 NOTE — ASSESSMENT & PLAN NOTE
Chronic, controlled.  Latest blood pressure and vitals reviewed-   Temp:  [96.6 °F (35.9 °C)-98.4 °F (36.9 °C)]   Pulse:  [62-87]   Resp:  [15-18]   BP: (117-178)/(58-80)   SpO2:  [95 %-100 %] .   Home meds for hypertension were reviewed and noted below.   Hypertension Medications             losartan (COZAAR) 50 MG tablet Take 1 tablet (50 mg total) by mouth once daily.    metoprolol tartrate (LOPRESSOR) 25 MG tablet Take 1 tablet (25 mg total) by mouth 2 (two) times daily.          While in the hospital, will manage blood pressure as follows; Continue home antihypertensive regimen    Will utilize p.r.n. blood pressure medication only if patient's blood pressure greater than  180/110 and she develops symptoms such as worsening chest pain or shortness of breath.       Thalidomide Counseling: I discussed with the patient the risks of thalidomide including but not limited to birth defects, anxiety, weakness, chest pain, dizziness, cough and severe allergy.

## 2023-04-25 NOTE — TELEPHONE ENCOUNTER
Patient's profile has been updated to her pharmacy of choice. All medication should been sent to new pharmacy .    4 = No assist / stand by assistance

## 2023-05-05 ENCOUNTER — OUTPATIENT CASE MANAGEMENT (OUTPATIENT)
Dept: ADMINISTRATIVE | Facility: OTHER | Age: 73
End: 2023-05-05
Payer: MEDICARE

## 2023-05-08 ENCOUNTER — OUTPATIENT CASE MANAGEMENT (OUTPATIENT)
Dept: ADMINISTRATIVE | Facility: OTHER | Age: 73
End: 2023-05-08
Payer: MEDICARE

## 2023-05-08 NOTE — PROGRESS NOTES
Outpatient Care Management   - Care Plan Follow Up    Patient: Patito Domingo  MRN:  [id number]  Date of Service:  5/5/2023  Completed by:  Eleanor Martinez LCSW  Referral Date: 03/13/2023    Reason for Visit   Patient presents with    OPCM SW Follow Up Call    Update Plan Of Care     5/5/2023  Brief Summary: SW contacted pt for follow up and attempted to contact [facility] regarding pt's status concerning the CCW. SW will follow up in two weeks or PRN.     Complex Care Plan     Care plan was discussed and completed today with input from patient and/or caregiver.    Patient Instructions     No follow-ups on file.

## 2023-05-08 NOTE — PROGRESS NOTES
Outpatient Care Management  Plan of Care Follow Up Visit    Patient: Patito Domingo  MRN: [id number]  Date of Service: 05/08/2023  Completed by: Bella Álvarez RN  Referral Date: 03/13/2023    Reason for Visit   Patient presents with    Update Plan Of Care       Brief Summary: OPCM follow up complete. Continued education on Cancer. Patient is in agreement with continued communication within 1-2 weeks  Next Steps: Sending message to NP Kaila requesting contacting patient to see if patient can be seen by NP. Continue to encourage patient to attend establish visit with PCP on 06/12/23. Once attended suggest setting up follow up Dr. Perez for additional medical care/ treatment

## 2023-05-08 NOTE — PROGRESS NOTES
Outpatient Care Management   - Care Plan Follow Up    Patient: Patito Domingo  MRN:  [id number]  Date of Service:  4/19/2023  Completed by:  Eleanor Martinez LCSW  Referral Date: 03/13/2023    Reason for Visit   Patient presents with    OPCM SW Follow Up Call    Update Plan Of Care     4/19/2023  Brief Summary: SW telephoned pt for follow up. Pt has not yet discovered the status of CCW application. SW will follow up with pt regarding CCW.    Complex Care Plan     Care plan was discussed and completed today with input from patient and/or caregiver.    Patient Instructions     No follow-ups on file.

## 2023-05-09 ENCOUNTER — OUTPATIENT CASE MANAGEMENT (OUTPATIENT)
Dept: ADMINISTRATIVE | Facility: OTHER | Age: 73
End: 2023-05-09
Payer: MEDICARE

## 2023-05-09 NOTE — PROGRESS NOTES
Outpatient Care Management   - Care Plan Follow Up    Patient: Patito Domingo  MRN:  [id number]  Date of Service:  5/9/2023  Completed by:  Eleanor Martinez LCSW  Referral Date: 03/13/2023    Reason for Visit   Patient presents with    OPCM SW Follow Up Call    Case Closure     5/9/2023  Brief Summary: MARIANO telephoned the pt for follow up and discussed case closure. SW will close the case.     Complex Care Plan     Care plan was discussed and completed today with input from patient and/or caregiver.    Patient Instructions     No follow-ups on file.

## 2023-05-19 ENCOUNTER — HOSPITAL ENCOUNTER (EMERGENCY)
Facility: HOSPITAL | Age: 73
Discharge: HOME | End: 2023-05-19
Attending: EMERGENCY MEDICINE
Payer: MEDICARE

## 2023-05-19 VITALS
OXYGEN SATURATION: 97 % | RESPIRATION RATE: 14 BRPM | BODY MASS INDEX: 28.08 KG/M2 | DIASTOLIC BLOOD PRESSURE: 79 MMHG | WEIGHT: 174 LBS | SYSTOLIC BLOOD PRESSURE: 133 MMHG | TEMPERATURE: 98 F | HEART RATE: 80 BPM

## 2023-05-19 DIAGNOSIS — R10.9 ABDOMINAL PAIN, UNSPECIFIED ABDOMINAL LOCATION: Primary | ICD-10-CM

## 2023-05-19 DIAGNOSIS — N30.00 ACUTE CYSTITIS WITHOUT HEMATURIA: ICD-10-CM

## 2023-05-19 DIAGNOSIS — R53.1 WEAKNESS: ICD-10-CM

## 2023-05-19 LAB
ALBUMIN SERPL BCP-MCNC: 3.6 G/DL (ref 3.5–5.2)
ALP SERPL-CCNC: 158 U/L (ref 55–135)
ALT SERPL W/O P-5'-P-CCNC: 64 U/L (ref 10–44)
ANION GAP SERPL CALC-SCNC: 11 MMOL/L (ref 8–16)
AST SERPL-CCNC: 80 U/L (ref 10–40)
BACTERIA #/AREA URNS HPF: ABNORMAL /HPF
BASOPHILS # BLD AUTO: 0.04 K/UL (ref 0–0.2)
BASOPHILS NFR BLD: 1 % (ref 0–1.9)
BILIRUB SERPL-MCNC: 0.6 MG/DL (ref 0.1–1)
BILIRUB UR QL STRIP: NEGATIVE
BUN SERPL-MCNC: 22 MG/DL (ref 8–23)
CALCIUM SERPL-MCNC: 9.4 MG/DL (ref 8.7–10.5)
CHLORIDE SERPL-SCNC: 106 MMOL/L (ref 95–110)
CLARITY UR: CLEAR
CO2 SERPL-SCNC: 23 MMOL/L (ref 23–29)
COLOR UR: YELLOW
CREAT SERPL-MCNC: 1.2 MG/DL (ref 0.5–1.4)
DIFFERENTIAL METHOD: ABNORMAL
EOSINOPHIL # BLD AUTO: 0 K/UL (ref 0–0.5)
EOSINOPHIL NFR BLD: 1 % (ref 0–8)
ERYTHROCYTE [DISTWIDTH] IN BLOOD BY AUTOMATED COUNT: 14 % (ref 11.5–14.5)
EST. GFR  (NO RACE VARIABLE): 49 ML/MIN/1.73 M^2
GLUCOSE SERPL-MCNC: 119 MG/DL (ref 70–110)
GLUCOSE UR QL STRIP: NEGATIVE
HCT VFR BLD AUTO: 40 % (ref 37–48.5)
HGB BLD-MCNC: 12.4 G/DL (ref 12–16)
HGB UR QL STRIP: ABNORMAL
HYALINE CASTS #/AREA URNS LPF: 6 /LPF
IMM GRANULOCYTES # BLD AUTO: 0.01 K/UL (ref 0–0.04)
IMM GRANULOCYTES NFR BLD AUTO: 0.2 % (ref 0–0.5)
KETONES UR QL STRIP: NEGATIVE
LEUKOCYTE ESTERASE UR QL STRIP: ABNORMAL
LIPASE SERPL-CCNC: 25 U/L (ref 4–60)
LYMPHOCYTES # BLD AUTO: 1.2 K/UL (ref 1–4.8)
LYMPHOCYTES NFR BLD: 29 % (ref 18–48)
MAGNESIUM SERPL-MCNC: 1.7 MG/DL (ref 1.6–2.6)
MCH RBC QN AUTO: 26.2 PG (ref 27–31)
MCHC RBC AUTO-ENTMCNC: 31 G/DL (ref 32–36)
MCV RBC AUTO: 85 FL (ref 82–98)
MICROSCOPIC COMMENT: ABNORMAL
MONOCYTES # BLD AUTO: 0.3 K/UL (ref 0.3–1)
MONOCYTES NFR BLD: 6.3 % (ref 4–15)
NEUTROPHILS # BLD AUTO: 2.6 K/UL (ref 1.8–7.7)
NEUTROPHILS NFR BLD: 62.5 % (ref 38–73)
NITRITE UR QL STRIP: NEGATIVE
NRBC BLD-RTO: 0 /100 WBC
PH UR STRIP: 6 [PH] (ref 5–8)
PLATELET # BLD AUTO: 240 K/UL (ref 150–450)
PMV BLD AUTO: 10.6 FL (ref 9.2–12.9)
POTASSIUM SERPL-SCNC: 3.5 MMOL/L (ref 3.5–5.1)
PROT SERPL-MCNC: 8 G/DL (ref 6–8.4)
PROT UR QL STRIP: ABNORMAL
RBC # BLD AUTO: 4.73 M/UL (ref 4–5.4)
RBC #/AREA URNS HPF: 14 /HPF (ref 0–4)
SODIUM SERPL-SCNC: 140 MMOL/L (ref 136–145)
SP GR UR STRIP: 1.02 (ref 1–1.03)
SQUAMOUS #/AREA URNS HPF: 1 /HPF
TROPONIN I SERPL DL<=0.01 NG/ML-MCNC: 0.02 NG/ML (ref 0–0.03)
URN SPEC COLLECT METH UR: ABNORMAL
UROBILINOGEN UR STRIP-ACNC: NEGATIVE EU/DL
WBC # BLD AUTO: 4.11 K/UL (ref 3.9–12.7)
WBC #/AREA URNS HPF: 15 /HPF (ref 0–5)
YEAST URNS QL MICRO: ABNORMAL

## 2023-05-19 PROCEDURE — 93010 EKG 12-LEAD: ICD-10-PCS | Mod: ,,, | Performed by: INTERNAL MEDICINE

## 2023-05-19 PROCEDURE — 87077 CULTURE AEROBIC IDENTIFY: CPT | Performed by: NURSE PRACTITIONER

## 2023-05-19 PROCEDURE — 63600175 PHARM REV CODE 636 W HCPCS: Performed by: NURSE PRACTITIONER

## 2023-05-19 PROCEDURE — 87086 URINE CULTURE/COLONY COUNT: CPT | Performed by: NURSE PRACTITIONER

## 2023-05-19 PROCEDURE — 80053 COMPREHEN METABOLIC PANEL: CPT | Performed by: NURSE PRACTITIONER

## 2023-05-19 PROCEDURE — 96361 HYDRATE IV INFUSION ADD-ON: CPT

## 2023-05-19 PROCEDURE — 25000003 PHARM REV CODE 250: Performed by: NURSE PRACTITIONER

## 2023-05-19 PROCEDURE — 83735 ASSAY OF MAGNESIUM: CPT | Performed by: NURSE PRACTITIONER

## 2023-05-19 PROCEDURE — 85025 COMPLETE CBC W/AUTO DIFF WBC: CPT | Performed by: NURSE PRACTITIONER

## 2023-05-19 PROCEDURE — 87088 URINE BACTERIA CULTURE: CPT | Performed by: NURSE PRACTITIONER

## 2023-05-19 PROCEDURE — 83690 ASSAY OF LIPASE: CPT | Performed by: NURSE PRACTITIONER

## 2023-05-19 PROCEDURE — 87186 SC STD MICRODIL/AGAR DIL: CPT | Performed by: NURSE PRACTITIONER

## 2023-05-19 PROCEDURE — 84484 ASSAY OF TROPONIN QUANT: CPT | Performed by: NURSE PRACTITIONER

## 2023-05-19 PROCEDURE — 93010 ELECTROCARDIOGRAM REPORT: CPT | Mod: ,,, | Performed by: INTERNAL MEDICINE

## 2023-05-19 PROCEDURE — 81000 URINALYSIS NONAUTO W/SCOPE: CPT | Performed by: NURSE PRACTITIONER

## 2023-05-19 PROCEDURE — 96365 THER/PROPH/DIAG IV INF INIT: CPT

## 2023-05-19 PROCEDURE — 99285 EMERGENCY DEPT VISIT HI MDM: CPT | Mod: 25

## 2023-05-19 PROCEDURE — 93005 ELECTROCARDIOGRAM TRACING: CPT

## 2023-05-19 RX ORDER — PROMETHAZINE HYDROCHLORIDE 12.5 MG/1
12.5 TABLET ORAL 3 TIMES DAILY PRN
Qty: 9 TABLET | Refills: 0 | Status: SHIPPED | OUTPATIENT
Start: 2023-05-19 | End: 2023-05-22

## 2023-05-19 RX ORDER — CEPHALEXIN 500 MG/1
500 CAPSULE ORAL EVERY 12 HOURS
Qty: 14 CAPSULE | Refills: 0 | Status: SHIPPED | OUTPATIENT
Start: 2023-05-19 | End: 2023-05-26

## 2023-05-19 RX ORDER — HYDROCODONE BITARTRATE AND ACETAMINOPHEN 5; 325 MG/1; MG/1
1 TABLET ORAL
Status: DISCONTINUED | OUTPATIENT
Start: 2023-05-19 | End: 2023-05-19 | Stop reason: HOSPADM

## 2023-05-19 RX ORDER — CEPHALEXIN 500 MG/1
500 CAPSULE ORAL
Status: COMPLETED | OUTPATIENT
Start: 2023-05-19 | End: 2023-05-19

## 2023-05-19 RX ADMIN — CEPHALEXIN 500 MG: 500 CAPSULE ORAL at 02:05

## 2023-05-19 RX ADMIN — SODIUM CHLORIDE 1000 ML: 0.9 INJECTION, SOLUTION INTRAVENOUS at 10:05

## 2023-05-19 RX ADMIN — PROMETHAZINE HYDROCHLORIDE 12.5 MG: 25 INJECTION INTRAMUSCULAR; INTRAVENOUS at 10:05

## 2023-05-19 NOTE — ED PROVIDER NOTES
Encounter Date: 5/19/2023    SCRIBE #1 NOTE: I, Can Torres, am scribing for, and in the presence of,  Erika Tovar NP. I have scribed the following portions of the note - Other sections scribed: HPI, ROS, Physical Exam, MDM.     History     Chief Complaint   Patient presents with    Abdominal Pain     Pt states that she is a cancer pt and is having abd pain and discomfort. Pt has not adequately been able to eat recently.      Patito Domingo is a 72 y.o. female who has a past medical history of Allergy, Arthritis, Cataract, Chronic diastolic (congestive) heart failure, Colon cancer, Encounter for blood transfusion, History of ESBL E. 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    The patient presents to the ED due to abdominal pain. She also complains of decreased appetite, dysuria, nausea, diarrhea, and lower back pain. No blood in stools. Patient has recent history of UTI/infected kidney stones and has been placed on several antibiotics over the last few month but reports she is not currently taking any. She also reports history of a neuroendocrine tumor of the liver but is not currently receiving chemotherapy or radiation. She states she was seen by the neuroendocrine group recently but they are no longer at this facility. She is also seen by Dr. Izaguirre, urologist.    The history is provided by the patient.   Review of patient's allergies indicates:   Allergen Reactions    Contrast media Hives, Itching and Swelling    Epinephrine Anaphylaxis     Can cause  a Carcinoid Crisis    Ibuprofen Hives, Itching and Swelling    Zofran [ondansetron hcl] Itching     And multiple other reactions    Iodinated contrast media     Morphine Other (See Comments)    Sulfa (sulfonamide antibiotics) Hives,  Itching and Swelling     Past Medical History:   Diagnosis Date    Allergy     Arthritis     Cataract     Chronic diastolic (congestive) heart failure     Colon cancer     Encounter for blood transfusion     History of ESBL E. 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)      Past Surgical History:   Procedure Laterality Date    ABDOMINAL SURGERY      CATARACT EXTRACTION Left 10/2017     SECTION      CHOLECYSTECTOMY      COLON SURGERY      cystoscope      CYSTOSCOPY W/ RETROGRADES Right 10/10/2019    Procedure: CYSTOSCOPY, WITH RETROGRADE PYELOGRAM;  Surgeon: Gen Isbell MD;  Location: [facility];  Service: Urology;  Laterality: Right;    ERCP N/A 2021    Procedure: ERCP (ENDOSCOPIC RETROGRADE CHOLANGIOPANCREATOGRAPHY);  Surgeon: Jayjay Rivera MD;  [facility]  Service: Endoscopy;  Laterality: N/A;    ERCP N/A 3/4/2022    Procedure: ERCP (ENDOSCOPIC RETROGRADE CHOLANGIOPANCREATOGRAPHY);  Surgeon: Rboy Virgen MD;  [facility]  Service: Endoscopy;  Laterality: N/A;    EYE SURGERY      HYSTERECTOMY  1996    LITHOTRIPSY      LIVER BIOPSY      carcinoid    URETEROSCOPY Right 10/10/2019    Procedure: URETEROSCOPY;  Surgeon: Gen Isbell MD;  Location: [facility];  Service: Urology;  Laterality: Right;    UTERINE FIBROID SURGERY       Family History   Problem Relation Age of Onset    Cancer Mother         unknown    Alzheimer's disease Father     Stroke Sister     No Known Problems Son     No Known Problems Son     No Known Problems Son     No Known Problems Son     Kidney disease Neg Hx      Social History     Tobacco Use    Smoking status: Never    Smokeless tobacco: Never   Substance Use Topics    Alcohol use: No      Alcohol/week: 0.0 standard drinks    Drug use: No     Review of Systems   Gastrointestinal:  Positive for abdominal pain, diarrhea and nausea. Negative for anal bleeding and blood in stool.   Genitourinary:  Positive for dysuria.   Musculoskeletal:  Positive for back pain.   All other systems reviewed and are negative.    Physical Exam     Initial Vitals   BP Pulse Resp Temp SpO2   05/19/23 0852 05/19/23 0852 05/19/23 0852 05/19/23 1222 05/19/23 0852   133/79 80 14 97.9 °F (36.6 °C) 97 %      MAP       --                Physical Exam    Constitutional: She appears well-developed and well-nourished.   HENT:   Head: Normocephalic.   Right Ear: Hearing and tympanic membrane normal.   Left Ear: Hearing and tympanic membrane normal.   Nose: Nose normal.   Mouth/Throat: Oropharynx is clear and moist. Mucous membranes are dry.   Eyes: Lids are normal. Pupils are equal, round, and reactive to light.   Neck:   Normal range of motion.  Cardiovascular:  Normal rate.           Pulmonary/Chest: Breath sounds normal. No respiratory distress. She has no wheezes. She has no rhonchi.   Abdominal: Abdomen is soft. There is abdominal tenderness.   Generalized abd tenderness   Musculoskeletal:         General: Normal range of motion.      Cervical back: Normal range of motion. No rigidity.     Neurological: She is alert and oriented to person, place, and time.   Skin: Skin is warm and dry. No rash noted.   Psychiatric: She has a normal mood and affect. Her behavior is normal. Judgment and thought content normal.       ED Course   Procedures  Labs Reviewed   CULTURE, URINE - Abnormal; Notable for the following components:       Result Value    Urine Culture, Routine   (*)     Value: KLEBSIELLA AEROGENES  10,000 - 49,999 cfu/ml      All other components within normal limits    Narrative:     In and Out Cath as needed it patient unable to void  Specimen Source->Urine   CBC W/ AUTO DIFFERENTIAL - Abnormal; Notable for the following  components:    MCH 26.2 (*)     MCHC 31.0 (*)     All other components within normal limits    Narrative:     For upper or mid abdominal pain.   COMPREHENSIVE METABOLIC PANEL - Abnormal; Notable for the following components:    Glucose 119 (*)     Alkaline Phosphatase 158 (*)     AST 80 (*)     ALT 64 (*)     eGFR 49 (*)     All other components within normal limits    Narrative:     For upper or mid abdominal pain.   URINALYSIS, REFLEX TO URINE CULTURE - Abnormal; Notable for the following components:    Protein, UA 1+ (*)     Occult Blood UA 1+ (*)     Leukocytes, UA 1+ (*)     All other components within normal limits    Narrative:     In and Out Cath as needed it patient unable to void  Specimen Source->Urine   URINALYSIS MICROSCOPIC - Abnormal; Notable for the following components:    RBC, UA 14 (*)     WBC, UA 15 (*)     Yeast, UA Few (*)     Hyaline Casts, UA 6 (*)     All other components within normal limits    Narrative:     In and Out Cath as needed it patient unable to void  Specimen Source->Urine   LIPASE    Narrative:     For upper or mid abdominal pain.   TROPONIN I   MAGNESIUM        ECG Results              EKG 12-lead (Final result)  Result time 05/19/23 17:28:15      Final result by Interface, Lab In [facility] (05/19/23 17:28:15)                   Narrative:    Test Reason : R53.1,    Vent. Rate : 064 BPM     Atrial Rate : 064 BPM     P-R Int : 174 ms          QRS Dur : 080 ms      QT Int : 402 ms       P-R-T Axes : 066 042 003 degrees     QTc Int : 414 ms    Normal sinus rhythm  Septal infarct (cited on or before 20-NOV-2021)  Abnormal ECG  When compared with ECG of 07-MAR-2023 11:19,  Nonspecific T wave abnormality, improved in Lateral leads  Confirmed by Flor Lopez MD ([id number]) on 5/19/2023 5:28:03 PM    Referred By: [email or web address]   SELF           Confirmed By:Flor Lopez MD                                  Imaging Results              CT Renal Stone Study ABD Pelvis WO (Final result)  Result time  05/19/23 11:58:16      Final result by Galdino Leon MD (05/19/23 11:58:16)                   Impression:      1. When compared to prior CT 07/02/2022, relatively similar appearance of multiple nonobstructing renal calculi.  No evidence of hydronephrosis.  2. Since the prior examination, progressive hepatic lesions concerning for metastasis in light of patient history of carcinoid.  Correlation with any intervening abdominal/pelvic imaging at outside institution would be recommended to assess stability.  3. Enlarging cardiophrenic lymph node concerning for metastasis.  4. Nonspecific induration is noted in right paramedian gluteal soft tissues.  Unclear whether this may represent phlegmon versus scar/fibrosis.  Clinical correlation recommended.  5. Additional details, as provided in the body of report.      Electronically signed by: Galdino Leon  Date:    05/19/2023  Time:    11:58               Narrative:    EXAMINATION:  CT RENAL STONE STUDY ABD PELVIS WO    CLINICAL HISTORY:  Flank pain, kidney stone suspected;    TECHNIQUE:  Low dose axial images, sagittal and coronal reformations were obtained from the lung bases to the pubic symphysis.  Contrast was not administered.    COMPARISON:  CT of the abdomen and pelvis performed 07/02/2022.    FINDINGS:  Evaluation of the solid organs is limited due to lack of intravenous contrast.    Lower chest: Assessment the lungs limited respiratory motion.  Question 2-3 mm solid nodule left lower lobe (series 2, image 10).    URINARY COLLECTING SYSTEM:    No definite evidence of obstructive uropathy.    Bilateral cortical atrophy.  Nonspecific bilateral perinephric stranding.  Side port type calculi within the mid and lower poles of the right than left calices again noted, relatively similar appearance to 07/02/2022 CT.    Liver: Numerous predominantly hypoattenuating masses are noted throughout the liver, concerning for metastatic disease in the setting of reported  history of metastatic carcinoid.  Hepatic findings have progressed by noncontrast CT technique when compared to most recent abdominal//pelvic CT imaging performed at this institution on 07/02/2022.    Gallbladder and bile ducts: Status post cholecystectomy.  Chronic pneumobilia is again noted.  Mild prominence of intrahepatic and extrahepatic bile ducts similar to mildly improved compared to remote CT imaging 07/02/2022.    Pancreas: Normal contour.13 mm ovoid fluid attenuation focus at the tail the pancreas (series 2, image 43), appears to been present on the 07/02/2022 CT.  Continued attention on follow-up be recommended.    Spleen: Normal contour.  Continued attention on follow-up comparison to any intervening imaging would be recommend    Adrenals: Normal contour.    Lymph nodes: Mildly prominent in number, but nonenlarged mesenteric and retroperitoneal lymph nodes are noted.  Enlarged cardiophrenic node has increased in size when compared to 07/02/2022 CT, currently measuring approximately 18 mm short axis compared to 10 mm previously (series 2, image 16).  Similar to minimally increased in size aortocaval lymph node (series 2, image 62), currently measuring approximately 10 mm short axis compared 8 mm previously.    Bowel and mesentery: No evidence of bowel obstruction.  Postoperative changes of the colon again noted.  Lobulated partially calcified soft tissue within the right upper quadrant mesentery (series 2, image 84), measuring approximately 55 x 29 mm, minimally changed when compared to 07/02/2022 CT.  Finding would be in keeping with reported history of carcinoid.    Abdominal aorta: Unremarkable.    Inferior vena cava: Unremarkable.    Free fluid or free air: None.    Pelvis: Unremarkable.    Body wall: Nonspecific induration is noted in right paramedian gluteal soft tissues (axial series 2, image 158).  Mild degree of body wall edema suggested.  Remote operative sequela in the anterior abdominal  wall.    Bones: No definite acute change.  Question osseous demineralization.  Degenerative findings are noted involving the spine, sacroiliac joints, pubic symphysis, and hip joints.                                       Medications   sodium chloride 0.9% bolus 1,000 mL 1,000 mL (0 mLs Intravenous Stopped 5/19/23 1115)   promethazine (PHENERGAN) 12.5 mg in dextrose 5 % (D5W) 50 mL IVPB (0 mg Intravenous Stopped 5/19/23 1035)   cephALEXin capsule 500 mg (500 mg Oral Given 5/19/23 1403)     Medical Decision Making:   History:   Old Medical Records: I decided to obtain old medical records.  Old Records Summarized: records from clinic visits and records from previous admission(s).       <> Summary of Records: 1/30/2023: Following admission for UTI, pt continued on home Minocycline 2/2 UA without evidence of infection. Started Pyridium for dysuria. Urology, Surgery and ID consulted. Repeat Urine testing did show infection, ID transitioned patient to Ertapenem. Urine culture from 1/30 with no growth. Culture from 1/31 with 20,000 staph epi. Multiple discussions had with patient, family and entire care team. Pt has had multiple recent admissions for the same issue. Urology has offered surgery but pt prefers to avoid this as it would take multiple procedures that would be high risk for her given comorbidity and low change for good outcome. Discussed palliative care/hospice, ultimately decision has been made with ID recommendations to go home on IV Ertapenem for 6 weeks in hopes to sterilize bilateral staghorn calculi which may be contributing to recurrent UTI. However low likelihood of being able to achieve sterilization but will try, PICC line placed and education conducted. Pt has remained hemodynamically stable, is to be discharged with PICC line in place to complete 6 weeks IV Ertapenem. Pt is to follow up with her Urologist at [facility] Dr. Parikh, has appointment early next week.   Initial Assessment:   Patito COLLADO  Chayo is a 72 y.o. female who presents to the ED with abdominal pain, dysuria, lower back pain, nausea, and diarrhea.  Differential Diagnosis:   Differential Diagnosis includes, but is not limited to:  AAA, aortic dissection, mesenteric ischemia, perforated viscous, MI/ACS, SBO/volvulus, incarcerated/strangulated hernia, intussusception, ileus, appendicitis, cholecystitis, cholangitis, diverticulitis, esophagitis, hepatitis, nephrolithiasis, pancreatitis, gastroenteritis, colitis, IBD/IBS, biliary colic, GERD, PUD, constipation, UTI/pyelonephritis,  disorder.      Clinical Tests:   Lab Tests: Ordered and Reviewed  Radiological Study: Ordered and Reviewed  Medical Tests: Ordered and Reviewed        Scribe Attestation:   Scribe #1: I performed the above scribed service and the documentation accurately describes the services I performed. I attest to the accuracy of the note.      ED Course as of 05/22/23 1136   Fri May 19, 2023   0938    Pt discharged for recurrent UTI in setting of bilateral staghorn calculi 2/2/23. Pt discharged on ertapenem IV for 6 weeks via PICC, urology follow up, pyridium for dysuria.    [DT]   1112 · Normal systolic function.  · The estimated ejection fraction is 70%.  · Normal left ventricular diastolic function.  · Normal right ventricular size with normal right ventricular systolic function.  · Mild tricuspid regurgitation.  · Mild pulmonic regurgitation.  · Normal central venous pressure (3 mmHg).  · The estimated PA systolic pressure is 29 mmHg.      [DT]   1130 CBC, chemistry, magnesium, troponin, lipase and urinary analysis have been reviewed.  There is a positive urinary tract infection noted at this time with +1 leuks and 15 whites.  A CT renal stone protocol is pending.  The patient is also noted to have some elevated liver enzymes which are elevated compared to prior readings from 2 months ago.  The AST is 80 with an ALT of 64 noted. [DT]   1203 Impression:     1. When compared  to prior CT 07/02/2022, relatively similar appearance of multiple nonobstructing renal calculi.  No evidence of hydronephrosis.  2. Since the prior examination, progressive hepatic lesions concerning for metastasis in light of patient history of carcinoid.  Correlation with any intervening abdominal/pelvic imaging at outside institution would be recommended to assess stability.  3. Enlarging cardiophrenic lymph node concerning for metastasis.  4. Nonspecific induration is noted in right paramedian gluteal soft tissues.  Unclear whether this may represent phlegmon versus scar/fibrosis.  Clinical correlation recommended.  5. Additional details, as provided in the body of report.       [DT]   1238 Secure chat sent to admit team with [facility]. I have reviewed the pts case with Dr Robbins, my attending. Pt has been having worsening development of the neuroendocrine tumor.  [DT]   1302 Spoke  with Dr Velez who states she has been offered correction in the past however refused. States she does not meet criteria at this time however will recommend  consult which has been placed.  [DT]   1344 Spoke with pts son who is now at the bedside. Notified we are working on  consult now to aid in home health care.  [DT]   1353 Attempting to contact Dr Perez at this time.  [DT]   1432 Spoke with Dr Munoz who states the pt can follow up with him in clinic next week. Reviewed CT findings. Pt states she is feeling better however would like one pain med before leaving. Her son is at the bedside who has also been educated on the need for follow up. She will be placed on keflex and given a few Phenergan tablets for nausea as needed. Stable for dc at this time.  [DT]      ED Course User Index  [DT] Erika Tovar NP                 Clinical Impression:   Final diagnoses:  [R53.1] Weakness  [R10.9] Abdominal pain, unspecified abdominal location (Primary)  [N30.00] Acute cystitis without hematuria        ED  Disposition Condition    Discharge Stable        I, Erika Tovar NP, personally performed the services described in this documentation.All medical record entries made by the scribe were at my direction and in my presence.I have reviewed the chart and agree that the record reflects my personal performance and is accurate and complete.   ED Prescriptions       Medication Sig Dispense Start Date End Date Auth. Provider    cephALEXin (KEFLEX) 500 MG capsule Take 1 capsule (500 mg total) by mouth every 12 (twelve) hours. for 7 days 14 capsule 5/19/2023 5/26/2023 Erika Tovar NP    promethazine (PHENERGAN) 12.5 MG Tab (Expires today) Take 1 tablet (12.5 mg total) by mouth 3 (three) times daily as needed for Nausea. 9 tablet 5/19/2023 5/22/2023 Erika Tovar NP          Follow-up Information       Follow up With Specialties Details Why Contact Info    Mariela Wei,  Family Medicine   [address] [place] [id number] [phone]      Chris Herbert MD General Surgery  CALL FOR APPMT ON MONDAY. [address] [facility]  [address]th Floor  [facility] [address]  [phone]               Erika Tovar NP  05/19/23 2023       Erika Tovar NP  05/22/23 6278

## 2023-05-19 NOTE — ED NOTES
"Hard IV access, this RN had multiple tries, charge tried with ultrasound with no success, then got IV in wrist 24G, this RN went to give pt abx and pt was sitting on edge of bed, IV wrapped around bed rail, entire purse poured out on bed and IV out of pt, this RN asked to attempt IV again, pt agreed, IV unsuccessful, pt began getting upset with this RN saying "you have no idea what you are doing and we arent doing this anymore", RN agreed and walked out of room, pt currently on pure-wick and urinating, pt wanted to walk to bathroom, but this RN felt it was unsafe due to current condition and loss of IV   "

## 2023-05-19 NOTE — ED NOTES
"Attempting to help pt get dressed for DC, pt states "I told you I need to change my pad first or do you have something wrong with your hearing?", this RN explained the pt didn't ask this RN that, pt told pt "just leave I'll get dressed myself"  "

## 2023-05-19 NOTE — DISCHARGE INSTRUCTIONS
Thank you for letting me care for you today! It was nice meeting you, and I hope you feel better soon. Please come back to [facility] for all of your future medical needs.   Our goal in the ER is to always give you outstanding care and exceptional service. You may receive a survey by mail or email in the next week about your experience in our ED. We would greatly appreciate you completing and returning the survey. Your feedback provides us with a way to recognize our staff who give very good care and it helps us learn how to improve when your experience was below our aspiration of excellence.     Sincerely,     JESÚS Elkins  Emergency Room Nurse Practitioner  [facility]

## 2023-05-22 ENCOUNTER — PATIENT OUTREACH (OUTPATIENT)
Dept: EMERGENCY MEDICINE | Facility: HOSPITAL | Age: 73
End: 2023-05-22
Payer: MEDICARE

## 2023-05-22 LAB — BACTERIA UR CULT: ABNORMAL

## 2023-05-22 NOTE — PROGRESS NOTES
Unable to reach. Pt was admitted to another hospital and is currently inpatient.     Karen Castaneda

## 2023-05-24 ENCOUNTER — EXTERNAL HOSPITAL ADMISSION (OUTPATIENT)
Dept: ADMINISTRATIVE | Facility: CLINIC | Age: 73
End: 2023-05-24
Payer: MEDICARE

## 2023-05-24 ENCOUNTER — PATIENT OUTREACH (OUTPATIENT)
Dept: ADMINISTRATIVE | Facility: CLINIC | Age: 73
End: 2023-05-24
Payer: MEDICARE

## 2023-06-12 ENCOUNTER — OFFICE VISIT (OUTPATIENT)
Dept: FAMILY MEDICINE | Facility: CLINIC | Age: 73
End: 2023-06-12
Payer: MEDICARE

## 2023-06-12 ENCOUNTER — TELEPHONE (OUTPATIENT)
Dept: FAMILY MEDICINE | Facility: CLINIC | Age: 73
End: 2023-06-12
Payer: MEDICARE

## 2023-06-12 DIAGNOSIS — C7B.00 METASTATIC CARCINOID TUMOR: ICD-10-CM

## 2023-06-12 DIAGNOSIS — K90.9 DIARRHEA DUE TO MALABSORPTION: Primary | ICD-10-CM

## 2023-06-12 DIAGNOSIS — R19.7 DIARRHEA DUE TO MALABSORPTION: Primary | ICD-10-CM

## 2023-06-12 DIAGNOSIS — N39.0 RECURRENT UTI: ICD-10-CM

## 2023-06-12 PROCEDURE — 99443 PR PHYSICIAN TELEPHONE EVALUATION 21-30 MIN: ICD-10-PCS | Mod: 95,,, | Performed by: STUDENT IN AN ORGANIZED HEALTH CARE EDUCATION/TRAINING PROGRAM

## 2023-06-12 PROCEDURE — 1160F RVW MEDS BY RX/DR IN RCRD: CPT | Mod: CPTII,95,, | Performed by: STUDENT IN AN ORGANIZED HEALTH CARE EDUCATION/TRAINING PROGRAM

## 2023-06-12 PROCEDURE — 99443 PR PHYSICIAN TELEPHONE EVALUATION 21-30 MIN: CPT | Mod: 95,,, | Performed by: STUDENT IN AN ORGANIZED HEALTH CARE EDUCATION/TRAINING PROGRAM

## 2023-06-12 PROCEDURE — 4010F ACE/ARB THERAPY RXD/TAKEN: CPT | Mod: CPTII,95,, | Performed by: STUDENT IN AN ORGANIZED HEALTH CARE EDUCATION/TRAINING PROGRAM

## 2023-06-12 PROCEDURE — 1160F PR REVIEW ALL MEDS BY PRESCRIBER/CLIN PHARMACIST DOCUMENTED: ICD-10-PCS | Mod: CPTII,95,, | Performed by: STUDENT IN AN ORGANIZED HEALTH CARE EDUCATION/TRAINING PROGRAM

## 2023-06-12 PROCEDURE — 1159F MED LIST DOCD IN RCRD: CPT | Mod: CPTII,95,, | Performed by: STUDENT IN AN ORGANIZED HEALTH CARE EDUCATION/TRAINING PROGRAM

## 2023-06-12 PROCEDURE — 1159F PR MEDICATION LIST DOCUMENTED IN MEDICAL RECORD: ICD-10-PCS | Mod: CPTII,95,, | Performed by: STUDENT IN AN ORGANIZED HEALTH CARE EDUCATION/TRAINING PROGRAM

## 2023-06-12 PROCEDURE — 4010F PR ACE/ARB THEARPY RXD/TAKEN: ICD-10-PCS | Mod: CPTII,95,, | Performed by: STUDENT IN AN ORGANIZED HEALTH CARE EDUCATION/TRAINING PROGRAM

## 2023-06-12 RX ORDER — DIPHENOXYLATE HYDROCHLORIDE AND ATROPINE SULFATE 2.5; .025 MG/1; MG/1
1 TABLET ORAL 4 TIMES DAILY PRN
Qty: 20 TABLET | Refills: 0 | Status: SHIPPED | OUTPATIENT
Start: 2023-06-12 | End: 2023-09-26 | Stop reason: CLARIF

## 2023-06-12 NOTE — PROGRESS NOTES
Subjective:      Patient ID: Patito Domingo is a 72 y.o. female.    Chief Complaint: Diarrhea    - hx carcinoid ca with mets  - has diarrhea time to time; usually lomotil helps but not helping this time; would like alternative to help so she can continue with normal routine   - she denies N/V  - she also feels she is getting UTI; has hx of recurrent UTI with MDRO VRE etc and there are no options to help; she has HH and can have them collect sample     Diarrhea   This is a chronic problem. The current episode started today. The problem occurs 2 to 4 times per day. The problem has been waxing and waning. The stool consistency is described as Mucous. Associated symptoms include abdominal pain. Pertinent negatives include no vomiting. Nothing aggravates the symptoms. Risk factors: carcinod tumor with mets. She has tried increased fluids, change of diet and anti-motility drug for the symptoms. The treatment provided mild relief.   Review of Systems   Gastrointestinal:  Positive for abdominal pain and diarrhea. Negative for blood in stool, constipation, nausea and vomiting.   Endocrine: Positive for polyuria.   Genitourinary:  Positive for dysuria and frequency.   All other systems reviewed and are negative.     Objective:     There were no vitals filed for this visit.   Physical Exam  Neurological:      Mental Status: Mental status is at baseline.   Psychiatric:         Mood and Affect: Mood normal.      Assessment:         1. Diarrhea due to malabsorption    2. Recurrent UTI    3. Metastatic carcinoid tumor          Plan:   1. Diarrhea due to malabsorption  - diphenoxylate-atropine 2.5-0.025 mg (LOMOTIL) 2.5-0.025 mg per tablet; Take 1 tablet by mouth 4 (four) times daily as needed for Diarrhea.  Dispense: 20 tablet; Refill: 0    2. Recurrent UTI  - Urinalysis; Future  - Urine culture; Future  - SUBSEQUENT HOME HEALTH ORDERS    3. Metastatic carcinoid tumor       Start lomotil QID PRN for diarrhea  Advised frequent  pull up changes to help prevent UTI   She does feel currently has UTI will have HH get this and send for culture   She is otherwise stable        Mariela Wei   [facility]   6/12/23     The patient location is: [place]  The chief complaint leading to consultation is: diarrhea     Visit type: audio only    Face to face: 0  30 minutes of total time spent on the encounter, which includes face to face time and non-face to face time preparing to see the patient (eg, review of tests), Obtaining and/or reviewing separately obtained history, Documenting clinical information in the electronic or other health record, Independently interpreting results (not separately reported) and communicating results to the patient/family/caregiver, or Care coordination (not separately reported).

## 2023-06-12 NOTE — TELEPHONE ENCOUNTER
----- Message from Elyssa Kiser sent at 6/12/2023  7:43 AM CDT -----  Regarding: virtual audio  Contact: [phone]  Patient is requesting a call back regarding she has diarrhea this morning and would like to change her office visit to a Virtual Audio visit.   Would the patient rather a call back or a response via [facility]?  Call   Best Call Back Number:  [phone]  Additional Information:

## 2023-06-12 NOTE — TELEPHONE ENCOUNTER
Called and spoke with pt in regards of her message. Pt states that she is unable to make her appt today, die to having severe diarrhea this morning. Pt wants to know can she move her appt to a virtual audio appt instead. Informed pt that I will speak with Dr. Wei in regards of this change and will get back in touch with her. Pt verbalized understanding of message.

## 2023-06-12 NOTE — TELEPHONE ENCOUNTER
Called and spoke with pt in regards of her appt for today. Informed pt that Dr. Wei is ok with doing a audio visit for today for 9:20 am, pt verbalized understanding of message.

## 2023-06-13 ENCOUNTER — TELEPHONE (OUTPATIENT)
Dept: FAMILY MEDICINE | Facility: CLINIC | Age: 73
End: 2023-06-13
Payer: MEDICARE

## 2023-06-13 NOTE — TELEPHONE ENCOUNTER
Called [facility] in regards of Dr. Wei wanting patient to complete a urine for a culture since pt is having issues. Spoke with Amy in regards of pt and gave a verbal order to get a urine culture and UA on the patient.  Amy verbalized understanding of verbal order.

## 2023-06-19 ENCOUNTER — OUTPATIENT CASE MANAGEMENT (OUTPATIENT)
Dept: ADMINISTRATIVE | Facility: OTHER | Age: 73
End: 2023-06-19
Payer: MEDICARE

## 2023-06-23 ENCOUNTER — EXTERNAL HOSPITAL ADMISSION (OUTPATIENT)
Dept: ADMINISTRATIVE | Facility: CLINIC | Age: 73
End: 2023-06-23
Payer: MEDICARE

## 2023-06-23 ENCOUNTER — PATIENT OUTREACH (OUTPATIENT)
Dept: ADMINISTRATIVE | Facility: CLINIC | Age: 73
End: 2023-06-23
Payer: MEDICARE

## 2023-06-23 ENCOUNTER — DOCUMENT SCAN (OUTPATIENT)
Dept: HOME HEALTH SERVICES | Facility: HOSPITAL | Age: 73
End: 2023-06-23
Payer: MEDICARE

## 2023-07-05 ENCOUNTER — PATIENT OUTREACH (OUTPATIENT)
Dept: ADMINISTRATIVE | Facility: CLINIC | Age: 73
End: 2023-07-05
Payer: MEDICARE

## 2023-07-05 ENCOUNTER — EXTERNAL HOSPITAL ADMISSION (OUTPATIENT)
Dept: ADMINISTRATIVE | Facility: CLINIC | Age: 73
End: 2023-07-05
Payer: MEDICARE

## 2023-07-05 ENCOUNTER — TELEPHONE (OUTPATIENT)
Dept: FAMILY MEDICINE | Facility: CLINIC | Age: 73
End: 2023-07-05
Payer: MEDICARE

## 2023-07-05 NOTE — TELEPHONE ENCOUNTER
Spoke to Princess with [facility] and she verbalized understanding of your message. She also says that she has metoprolol tartate no 25 mg twice a day, she doesn't have the XR she states if it makes a difference to call theXR to the pharmacy. Please advise.

## 2023-07-05 NOTE — TELEPHONE ENCOUNTER
----- Message from Mariela Wei DO sent at 7/5/2023  4:33 PM CDT -----  Regarding: FW: [facility]  Contact: [facility]/[phone]  Losartan 100  Metoprolol XL 25 daily  Pt should still be taking lipitor 40  ----- Message -----  From: Taylor Faustin MA  Sent: 7/5/2023   3:44 PM CDT  To: Mariela Wei DO  Subject: FW: [facility]                      Please advise which medication and how she is supposed to be taking them  ----- Message -----  From: Elyssa Kiser  Sent: 7/5/2023   2:05 PM CDT  To: [facility] Staff  Subject: [facility]/Home Health nurse is reporting medication changes. She takes losartan (COZAAR) 100 MG tablets and the discharge list shows she is taking 50 mg.   Original is metoprolol tartrate (LOPRESSOR) 25 MG tablets 2 times a day. Discharge is 25XL Once a day.   Is the atorvastatin (LIPITOR) 40 MG tablets Discontinued?    Please confirm the dosage.   Would the patient rather a call back or a response via [facility]?  Call   Best Call Back Number:  [facility]/[phone]  Additional Information:

## 2023-07-05 NOTE — TELEPHONE ENCOUNTER
Pt son states he will have to call back to schedule hosp f/u since the times we try to offer did not work for him.

## 2023-07-05 NOTE — PROGRESS NOTES
C3 nurse spoke with Patito Domingo's son, Ryan, for a TCC post hospital discharge follow up call. The patient does not have a scheduled HOSFU appointment with PCP within 5-7 days post hospital discharge date 7/3/23. Message sent to PCP staff requesting they contact patient and schedule follow up appointment.    Patient's son does not know all of her medications and could not review them. C3 nurse asked him to have the patient bring all medications and discharge paperwork to appointment, when scheduled.     Patient is having Ureteral Stent removed with Dr. Roberts on 7/6/23.

## 2023-08-18 NOTE — PLAN OF CARE
Problem: Adult Inpatient Plan of Care  Goal: Plan of Care Review  Outcome: Ongoing, Progressing  Goal: Patient-Specific Goal (Individualized)  Outcome: Ongoing, Progressing  Goal: Absence of Hospital-Acquired Illness or Injury  Outcome: Ongoing, Progressing  Goal: Optimal Comfort and Wellbeing  Outcome: Ongoing, Progressing  Goal: Readiness for Transition of Care  Outcome: Ongoing, Progressing     Problem: Infection  Goal: Absence of Infection Signs and Symptoms  Outcome: Ongoing, Progressing     Problem: Fluid and Electrolyte Imbalance (Acute Kidney Injury/Impairment)  Goal: Fluid and Electrolyte Balance  Outcome: Ongoing, Progressing     Problem: Oral Intake Inadequate (Acute Kidney Injury/Impairment)  Goal: Optimal Nutrition Intake  Outcome: Ongoing, Progressing     Problem: Renal Function Impairment (Acute Kidney Injury/Impairment)  Goal: Effective Renal Function  Outcome: Ongoing, Progressing      

## 2023-08-22 ENCOUNTER — NURSE TRIAGE (OUTPATIENT)
Dept: ADMINISTRATIVE | Facility: CLINIC | Age: 73
End: 2023-08-22
Payer: MEDICARE

## 2023-08-22 ENCOUNTER — PES CALL (OUTPATIENT)
Dept: ADMINISTRATIVE | Facility: CLINIC | Age: 73
End: 2023-08-22
Payer: MEDICARE

## 2023-08-22 NOTE — TELEPHONE ENCOUNTER
[place]    PCP:  Dr. Mariela Wei    Pt is returning a PES agent call.  Pt declined triage at this time.  Message routed to , Maryann GUERRA RN, for return call to pt.  Advised pt that someone would return a call to her.  Pt VU.  Advised to call for new symptoms/questions/concerns.  VU.  Message also routed to PCP staff.    Reason for Disposition   Non-urgent call redirected to specialist's office because it is open    Additional Information   Negative: Caller has already spoken with the PCP (or office), and has no further questions   Negative: Caller has already spoken with another triager and has no further questions   Negative: Caller has already spoken with another triager or PCP (or office), and has further questions and triager able to answer questions.   Negative: Busy signal.  First attempt to contact caller.  Follow-up call scheduled within 15 minutes.   Negative: No answer.  First attempt to contact caller.  Follow-up call scheduled within 15 minutes.   Negative: Message left on identified voicemail   Negative: Message left on unidentified voice mail. Phone number verified.   Negative: Message left with person in household   Negative: Wrong number reached. Phone number verified.   Negative: Second attempt to contact caller AND no contact made. Phone number verified.   Negative: Third attempt to contact caller AND no contact made. Phone number verified.   Negative: Cell phone out of range. Phone number verified.   Negative: Patient already left for the hospital/clinic   Negative: Caller has cancelled the call before the first contact   Negative: Unable to complete triage due to phone connection issues    Protocols used: No Contact or Duplicate Contact Call-A-[place]

## 2023-08-28 RX ORDER — METOPROLOL TARTRATE 25 MG/1
25 TABLET, FILM COATED ORAL 2 TIMES DAILY
Qty: 90 TABLET | Refills: 1 | Status: ON HOLD | OUTPATIENT
Start: 2023-08-28 | End: 2023-09-29 | Stop reason: HOSPADM

## 2023-08-28 NOTE — TELEPHONE ENCOUNTER
One month refill authorized. Appointment needed for follow up.      Harjit Carlson, BERENICE  [facility]

## 2023-08-28 NOTE — TELEPHONE ENCOUNTER
No care due was identified.  [facility] Embedded Care Due Messages. Reference number: [id number].   8/28/2023 1:40:43 PM CDT

## 2023-08-28 NOTE — TELEPHONE ENCOUNTER
----- Message from Shelly Joe sent at 8/28/2023 12:09 PM CDT -----  Type:  RX Refill Request    Who Called:  Pt  Refill or New Rx: Refill  RX Name and Strength: metoprolol tartrate (LOPRESSOR) 25 MG tablet [[id number]]  How is the patient currently taking it? (ex. 1XDay): Take 25 mg by mouth 2 (two) times daily. - Oral  Is this a 30 day or 90 day RX:   Preferred Pharmacy with phone number: [facility] #[phone]  Local or Mail Order: Local  Ordering Provider: Sanjuana  Would the patient rather a call back or a response via [facility]?  call  Best Call Back Number: [phone]  Additional Information:

## 2023-08-30 ENCOUNTER — TELEPHONE (OUTPATIENT)
Dept: HOME HEALTH SERVICES | Facility: CLINIC | Age: 73
End: 2023-08-30
Payer: MEDICARE

## 2023-08-30 NOTE — TELEPHONE ENCOUNTER
Patient states she is without metoprolol.  Medications were refilled by Dr. Wei on 8/28 and sent to mail order pharmacy.  Called patient to see if she needed us to send an immediate refill of medication.  States she hasn't had her blood pressure medication for at least 2 weeks.  Patient crying and stating she is feeling really bad.  Asked patient if she felt she needed to go to ER.  States yes but too weak to call herself.  This nurse called 911 for patient and gave all patient information to EMS .  Symptoms include weakness, fatigue, dizziness, difficulty walking, slightly labored breathing, pain to right side next to belly button.  Denies Any bleeding, vomiting, falls, and loss of consciousness.  Remained on the phone with patient until EMS arrived.  Patient taken to hospital for evaluation and treatment per [facility].

## 2023-09-06 NOTE — PROGRESS NOTES
GREGORIOW met with patient to discuss discharge and additional patient barriers to care. Pt identified no additional social barriers to care at this time. Per pt, pt is not in need of resources at this time.    The following were addressed during this visit:  -Complete follow-up with patient    MADHURI Matias   The defib pads were placed on the patient's chest/back.

## 2023-09-11 ENCOUNTER — PATIENT OUTREACH (OUTPATIENT)
Dept: ADMINISTRATIVE | Facility: CLINIC | Age: 73
End: 2023-09-11
Payer: MEDICARE

## 2023-09-11 ENCOUNTER — EXTERNAL HOSPITAL ADMISSION (OUTPATIENT)
Dept: ADMINISTRATIVE | Facility: CLINIC | Age: 73
End: 2023-09-11
Payer: MEDICARE

## 2023-09-26 PROBLEM — N30.00 ACUTE CYSTITIS WITHOUT HEMATURIA: Status: ACTIVE | Noted: 2018-10-08

## 2023-09-26 PROBLEM — N39.0 URINARY TRACT INFECTION WITHOUT HEMATURIA: Status: RESOLVED | Noted: 2023-09-26 | Resolved: 2023-09-26

## 2023-09-26 PROBLEM — N39.0 URINARY TRACT INFECTION WITHOUT HEMATURIA: Status: ACTIVE | Noted: 2023-09-26

## 2023-09-26 PROBLEM — I10 MALIGNANT ESSENTIAL HYPERTENSION: Status: ACTIVE | Noted: 2023-09-26

## 2023-09-26 NOTE — HOSPITAL COURSE
Admitted for RUQ pain. US and MRCP unrevealing. Labs wnl. Patient with known hx of bilateral large kidney stones. Urine cx previously positive with resistant organism. Urology consult placed for stone removal. Planned for outpatient removal though patient unsure of surgery. Repeat urine culture from straight cath negative. Patient pain improved. Discharged with urology followup.   PREOPERATIVE DIAGNOSIS:  wet macular degeneration right eye    POSTOPERATIVE DIAGNOSIS: wet macular degeneration right eye    PROCEDURE: Intravitreal Avastin injection right eye (2/11/20, 3/10/20, 5/5/20, 8/4/20, 10/27/20, 2/2/21, 8/3/21, 10/26/21, 1/21/22, 4/20/22, 7/12/22, 9/6/22, 10/4/22, 1/3/23, 4/4/23, 6/27/23, 9/26/23)    ANESTHESIA: topical    COMPLICATIONS: none    TESTS:  OCT mac  OD PED, drusen - stable  OS PED, drusen - stable,     All tests interpreted and reviewed with patient.    DESCRIPTION OF PROCEDURE:     The procedure was explained in detail to the patient along with potential complications including endophthalmitis, retinal detachment, glaucoma and blindness.  The patient understood the risks, benefits and alternatives and wished to proceed.    One drop of tetracaine was placed in the eye. The patient was then placed in a reclined position. The patient was prepped with Betadine swabs used on the lids and Betadine ophthalmic placed in the eye using sterile technique. Another drop of tetracaine was placed in the eye. A sterile lid speculum was placed between the eyelids. The sclera was marked at 4.0 mm from the limbus using calipers.  0.05 mL was injected through the pars plana using a 30 gauge needle on a TB syringe through the previously placed kendall on the sclera without difficulty.  Upon removing the needle a sterile cotton swab with betadine was placed over the wound revealing no vitreous loss.   There were no complications and the patient tolerated the procedure well.    Return in about 12 weeks (around 12/19/2023) for avastin, OD, no oct. or immediately as needed as instructed for pain, redness, or decreased vision.

## 2023-09-27 PROBLEM — E87.6 HYPOKALEMIA: Status: RESOLVED | Noted: 2020-01-14 | Resolved: 2023-09-27

## 2023-09-29 ENCOUNTER — TELEPHONE (OUTPATIENT)
Dept: FAMILY MEDICINE | Facility: CLINIC | Age: 73
End: 2023-09-29
Payer: MEDICARE

## 2023-09-29 PROBLEM — R19.7 DIARRHEA: Status: RESOLVED | Noted: 2021-04-11 | Resolved: 2023-09-29

## 2023-09-29 PROBLEM — N30.00 ACUTE CYSTITIS WITHOUT HEMATURIA: Status: RESOLVED | Noted: 2018-10-08 | Resolved: 2023-09-29

## 2023-09-29 NOTE — TELEPHONE ENCOUNTER
Called and spoke with patient's son Ryan in regards of message on his mother.  Informed pt's son that I got a message on his mother today stating that pt needs a hospital follow up. Pt's son states that he will call us back to schedule apt once he speaks to his other brothers to see who can bring their mother to to doctor for her hospital follow up.

## 2023-09-29 NOTE — TELEPHONE ENCOUNTER
----- Message from Dale Wiley sent at 9/29/2023 11:25 AM CDT -----  Type:  Sooner Appointment Request    Caller is requesting a sooner appointment.  Caller declined first available appointment listed below.  Caller will not accept being placed on the waitlist and is requesting a message be sent to doctor.  Name of Caller:Ashley/ Eleonora nurse   When is the first available appointment?10/10  Symptoms:HFU/ severe UTI  Would the patient rather a call back or a response via [facility]? call  Best Call Back Number:[phone]  Additional Information:   Pt will be release today

## 2023-10-02 ENCOUNTER — PATIENT OUTREACH (OUTPATIENT)
Dept: ADMINISTRATIVE | Facility: CLINIC | Age: 73
End: 2023-10-02
Payer: MEDICARE

## 2023-10-02 ENCOUNTER — PES CALL (OUTPATIENT)
Dept: HOME HEALTH SERVICES | Facility: CLINIC | Age: 73
End: 2023-10-02
Payer: MEDICARE

## 2023-10-03 ENCOUNTER — PES CALL (OUTPATIENT)
Dept: HOME HEALTH SERVICES | Facility: CLINIC | Age: 73
End: 2023-10-03
Payer: MEDICARE

## 2023-10-03 PROCEDURE — G0180 MD CERTIFICATION HHA PATIENT: HCPCS | Mod: ,,, | Performed by: INTERNAL MEDICINE

## 2023-10-03 PROCEDURE — G0180 PR HOME HEALTH MD CERTIFICATION: ICD-10-PCS | Mod: ,,, | Performed by: INTERNAL MEDICINE

## 2023-10-05 ENCOUNTER — PES CALL (OUTPATIENT)
Dept: HOME HEALTH SERVICES | Facility: CLINIC | Age: 73
End: 2023-10-05
Payer: MEDICARE

## 2023-10-11 ENCOUNTER — CARE AT HOME (OUTPATIENT)
Dept: HOME HEALTH SERVICES | Facility: CLINIC | Age: 73
End: 2023-10-11
Payer: MEDICARE

## 2023-10-11 DIAGNOSIS — G89.4 CHRONIC PAIN SYNDROME: ICD-10-CM

## 2023-10-11 DIAGNOSIS — I50.32 CHRONIC DIASTOLIC HEART FAILURE WITH PRESERVED EJECTION FRACTION: ICD-10-CM

## 2023-10-11 DIAGNOSIS — C7A.095 MALIGNANT CARCINOID TUMOR OF MIDGUT: ICD-10-CM

## 2023-10-11 DIAGNOSIS — Z71.89 COUNSELING REGARDING ADVANCE CARE PLANNING AND GOALS OF CARE: ICD-10-CM

## 2023-10-11 DIAGNOSIS — Z86.19 HISTORY OF ESBL KLEBSIELLA PNEUMONIAE INFECTION: ICD-10-CM

## 2023-10-11 DIAGNOSIS — N20.0 STAGHORN CALCULUS: ICD-10-CM

## 2023-10-11 DIAGNOSIS — Z51.5 PALLIATIVE CARE ENCOUNTER: Primary | ICD-10-CM

## 2023-10-11 PROCEDURE — 99350 HOME/RES VST EST HIGH MDM 60: CPT | Mod: S$GLB,,, | Performed by: NURSE PRACTITIONER

## 2023-10-11 PROCEDURE — 1101F PT FALLS ASSESS-DOCD LE1/YR: CPT | Mod: CPTII,S$GLB,, | Performed by: NURSE PRACTITIONER

## 2023-10-11 PROCEDURE — 99497 ADVNCD CARE PLAN 30 MIN: CPT | Mod: S$GLB,,, | Performed by: NURSE PRACTITIONER

## 2023-10-11 PROCEDURE — 3078F DIAST BP <80 MM HG: CPT | Mod: CPTII,S$GLB,, | Performed by: NURSE PRACTITIONER

## 2023-10-11 PROCEDURE — 3288F FALL RISK ASSESSMENT DOCD: CPT | Mod: CPTII,S$GLB,, | Performed by: NURSE PRACTITIONER

## 2023-10-11 PROCEDURE — 1111F DSCHRG MED/CURRENT MED MERGE: CPT | Mod: CPTII,S$GLB,, | Performed by: NURSE PRACTITIONER

## 2023-10-11 PROCEDURE — 3074F SYST BP LT 130 MM HG: CPT | Mod: CPTII,S$GLB,, | Performed by: NURSE PRACTITIONER

## 2023-10-11 PROCEDURE — 4010F ACE/ARB THERAPY RXD/TAKEN: CPT | Mod: CPTII,S$GLB,, | Performed by: NURSE PRACTITIONER

## 2023-10-11 NOTE — PATIENT INSTRUCTIONS
FOLLOW-UP INSTRUCTIONS:    Follow-up with palliative care NP in 4-6 weeks on 2/1/2024@home visit  Continue all medications, treatments, and therapies as ordered  Fall precautions at all times  Maintain Safety precautions at all times  Attend all medical appointments as scheduled  F/u with PCP as needed  Patient to have 6 feet between each other  In an Emergency, call 911 or go to ED; notify PCP's office  9. Limit Risks of environmental exposure to coronavirus as discussed including: social distancing, hand hygiene, and limiting departures from the home for necessities only.   10. Patient not to be left alone  11. Instructed pt to Return [place] [facility] [place] Abx for UTI  12. F/u with urology  13. Pt. To complete [place] and [place] forms

## 2023-10-11 NOTE — PROGRESS NOTES
[facility]  Palliative Care Home Visit    Visit Date:  10/11/2023  Encounter Provider:  Kristen Ferrer, NARCISA, FNP-c  PCP:  Mariela Wei DO    Subjective:      Patient ID: Patito Domingo is a 73 y.o. female.    Consult Requested By:  Noble Higgins M.D.  Reason for Consult:  Palliative Care      Chief Complaint: Establish Palliative Care    Outpatient Palliative Care Encounter:    Patito Domingo is being seen at home due to being seen at home due to physical debility that presents a taxing effort to leave the home, to mitigate high risk of hospital readmission and/or due to the limited availability of reliable or safe options for transportation to the point of access to the provider. Prior to treatment on this visit the chart was reviewed and patient verbal consent was obtained.      PMHx:    Ms. 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     Social History:  Ms. Domingo was  to her  Matias for over 20 years but then .  Matias then remarried. JONATHAN Caputo has 4 adult children (all boys); 1 son lives in [place] and the other 3 live locally in [place]; she has 9 grandchildren. Ms. Domingo  did not serve in the [place] . She has 9 siblings and 2 step brothers. 3 sisters and 2 brothers passed away. Ms. Domingo worked as a  in [employer] for 11 years and she served on the [employer]. She retired in 1990s.     Youngest son Matias wants to put his mom in a nursing home.     Impression:  Ms. Domingo is  being seen in conjunction with her PCP Dr. Mariela Wei, and any significant findings will be shared with the PCP. Ms. Caputo is sitting on the side of her bed in her bedroom of her apartment. NP is seeing pt at home along with 4th year Medical student Roby.     Ms. Domingo is being seen today@home to establish palliative care services. She is AA&Ox2-3 person and place. C/o pain to right flank area 8/10; no recent falls. Reports she has someone who comes 3 x week to assist her at home. Appetite is good.      Ms. Domingo reports she went to the ED on 10/2 for flank pain and she was dx with UTI and recent call from ED nurse notifying her she needs to return to the ED for IV antibiotics. Patient states she needs to f/u with urologist concerning renal calculus and scheduling surgery. Explained to patient it is important she go to the ED;she verbalizes understanding.     Note from [facility]   Patient was seen in the ED on 10/02/2023 for right flank pain, at which time urine culture was collected. At the time patient was discharged from the ED with antibiotics. Following resolution of the culture which was resistant to previously prescribed antibiotic, ED called patient to let her know that she needs to report back to the hospital for IV antibiotics. since she was meeting with urologist to discuss surgery for right staghorn calculi. Patient states she is signed consent to move forward with surgical removal of right staghorn Today patient has complaint of dysuria. Patient admitted for complicated UTI.     NOTE from [facility]:      LaPOST  Reviewed LaPOST form with Ms. Caputo and left form with her to complete. Pt would like to continue to be a Full Code    Additional Note:  Patient seen Dr. Balwinder Telles @[facility] for Kidney Stone.      Review of Systems   Constitutional:  Positive for activity change (not getting around as much) and fatigue. Negative for appetite change and unexpected weight change.   HENT:  Positive for  rhinorrhea. Negative for sore throat and trouble swallowing.    Respiratory:  Negative for cough and shortness of breath.    Cardiovascular:  Negative for chest pain and leg swelling.   Gastrointestinal:  Positive for constipation. Negative for abdominal distention.        Right sided pain back   Genitourinary:  Positive for difficulty urinating (burning) and flank pain.   Musculoskeletal:  Positive for back pain (lower back pain). Negative for neck pain.        +right Flank pain   Skin:  Negative for wound.   Neurological:  Negative for dizziness, tremors and numbness.   Hematological:  Bruises/bleeds easily (right upper arm).   Psychiatric/Behavioral:  The patient is nervous/anxious.      Goals of Care:  She wants to get stronger and healthier. She wants to get out again, and spent time outside.   She wants to be more independent and take care of herself better, like cooking for herself.    I initiated the process of advance care planning today and explained the importance of this process to the patient and family.  I introduced the concept of advance directives to the patient, as well. Then the patient received detailed information about the importance of designating a Health Care Power of  (HCPOA). She was also instructed to communicate with this person about her wishes for future healthcare, should she become sick and lose decision-making capacity.    I Introduced LaPOST form with patient/family, explaining this is the patient's wishes, and this   form will be uploaded into [facility] and the [place] Registry.        10/11/2023 Discussed goals of care with Ms. Caputo and she wants to get stronger and wants to feel better and have kidney stone removed.     PLAN:  1. Follow-up with Palliative Care NP in 4-6 weeks on 2/1/2024@home visit  2. Continue all medications  3. F/u with PCP as needed  4. In Emergency, call 911 or go to ED; notify PCP or Palliative Care NP  5. Pt to go to [facility] ED  for IV Abx for UTI (called by [facility] [place])  6. Pt to complete LAPOST and POA forms    Assessments:  Environmental: single story house, clean, uncluttered, and good lighting  Functional Status: Needs assistance bathing and dressing able to feed self  Safety: Fall precautions and Covid 19 precautions  Nutritional: good apatite, eats 2-3 meals a day with snacking.    Home Health/DME/Supplies: Hospital bed, Shower chair/bench, Transport chair, and bed side commode, grab bar in shower.  [facility] with nurse/PT/OT    Assessment and Plan:    1. Palliative care encounter  -palliative care referral placed  -initiate palliative care services today  -Reviewed LaPOST Form & left form at patient's home   -LaPOST Form completed on   -CODE STATUS>>>Full Code  -Discussed Palliative care and Hospice  -Patient/family want to continue with Palliative care services  -10/11/2023 admit to palliative care>>>pt to complete LaPOST and POA forms (reviewed with patient)    2. Staghorn calculus  Overview:  Chronic stable    Orders:  -     Ambulatory referral/consult to [facility]  -f/u with urologist [email or web address] [phone]    3. History of ESBL Klebsiella pneumoniae infection  -     Ambulatory referral/consult to [facility]  -continue Abx  -10/11 called via [facility] nurse>>pt to return to ED for IV Abx    4. Malignant carcinoid tumor of midgut  Overview:  Formatting of this note might be different from the original.  Last Assessment & Plan:   Formatting of this note might be different from the original.  - likely cause/ contributing to abdominal pain  - follows with heme/onc as outpatient  - continue home PRN oxycodone  -10/11 f/u with endocrinology/oncologist    Orders:  -     Ambulatory referral/consult to [facility]    5. Counseling regarding advance care planning and goals of care  10/11 Discussed goals of care with Ms.  Patito and she would like to be pain free, would like to get stronger and would like to have surgery to have stone removed    6. Chronic pain syndrome  -continue Tylenol prn pain      7. Chronic diastolic heart failure with preserved ejection fraction  3/7/2023 TTE with normal systolic function. EF 70%;mild pulmonic regurgitation  -continue Metoprolol and Losartan  -continue Amlodipine  -f/u with Cardiology      Were controlled substances prescribed?  No    Total clinical care time was 60min. Reviewed Chart and PCP's notes thenThe following issues were discussed: PMHx, PSHx, Social history, reviewed medications, diet and appetite, fall precautions, staghorn calculus and f/u with urologist, hx of ESBL klebsiella pneumoniae, malignant carcinoid tumor of midgut, chronic pain syndrome, chronic diastolic heart failure. Discussed she needs to return to [facility] ED as soon as possible.     An additional 30 minutes of the visit was spent in advanced care planning.  Explained to patient what palliative care is and palliative care vs home health and hospice. Reviewed LaPOST and AD with patient. Discussed goals of care   Left LaPOST and POA forms at house for patient to complete    History:  Past Medical History:   Diagnosis Date    Anemia of chronic disease     Also with iron deficiency    Bacteremia due to Klebsiella pneumoniae     Cataract     Chronic diastolic (congestive) heart failure     Colon cancer     Encounter for blood transfusion     History of ESBL E. 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)      Family History   Problem Relation Age of Onset    Cancer Mother         unknown    Alzheimer's disease Father     Stroke Sister     No Known Problems Son     No Known Problems Son     No Known Problems Son     No Known Problems Son     Kidney disease Neg Hx      Past Surgical History:   Procedure Laterality Date    ABDOMINAL SURGERY      CATARACT EXTRACTION Left 10/2017     SECTION      CHOLECYSTECTOMY      COLON SURGERY      cystoscope      CYSTOSCOPY W/ RETROGRADES Right 10/10/2019    Procedure: CYSTOSCOPY, WITH RETROGRADE PYELOGRAM;  Surgeon: Gen Isbell MD;  Location: [facility] OR;  Service: Urology;  Laterality: Right;    ERCP N/A 2021    Procedure: ERCP (ENDOSCOPIC RETROGRADE CHOLANGIOPANCREATOGRAPHY);  Surgeon: Jayjay Rivera MD;  Location: [facility] ENDO ([address] [place] [address]);  Service: Endoscopy;  Laterality: N/A;    ERCP N/A 3/4/2022    Procedure: ERCP (ENDOSCOPIC RETROGRADE CHOLANGIOPANCREATOGRAPHY);  Surgeon: Roby Virgen MD;  Location: [facility] ([facility]);  Service: Endoscopy;  Laterality: N/A;    EYE SURGERY      HYSTERECTOMY  1996    LITHOTRIPSY      LIVER BIOPSY      carcinoid    URETEROSCOPY Right 10/10/2019    Procedure: URETEROSCOPY;  Surgeon: Gen Isbell MD;  Location: [facility] OR;  Service: Urology;  Laterality: Right;    UTERINE FIBROID SURGERY       Review of patient's allergies indicates:   Allergen Reactions    Contrast media Hives, Itching and Swelling    Epinephrine Anaphylaxis     Can cause  a Carcinoid Crisis    Ibuprofen Hives, Itching and Swelling    Zofran [ondansetron hcl] Itching     And multiple other reactions    Iodinated contrast media     Morphine Other (See Comments)    Sulfa (sulfonamide antibiotics) Hives, Itching and Swelling       Medications:  No current facility-administered medications for this visit.  No current outpatient medications on file.    Facility-Administered Medications Ordered in Other  Visits:     acetaminophen tablet 650 mg, 650 mg, Oral, Q8H PRN, Chris Torres MD    acetaminophen tablet 650 mg, 650 mg, Oral, Q4H PRN, Chris Torres MD, 650 mg at 01/07/24 0614    aluminum-magnesium hydroxide-simethicone 200-200-20 mg/5 mL suspension 30 mL, 30 mL, Oral, QID PRN, Chris Torres MD    ciprofloxacin HCl tablet 750 mg, 750 mg, Oral, Q12H, Katherine Solis DO, 750 mg at 01/07/24 1155    enoxaparin injection 40 mg, 40 mg, Subcutaneous, Q24H (prophylaxis, 1700), Chris Torres MD, 40 mg at 01/06/24 1853    Lactobacillus rhamnosus GG capsule 1 capsule, 1 capsule, Oral, Daily, Chris Torres MD, 1 capsule at 01/07/24 0952    LIDOcaine 5 % patch 1 patch, 1 patch, Transdermal, Q24H, Rolando Palacio DO, 1 patch at 01/07/24 0952    melatonin tablet 6 mg, 6 mg, Oral, Nightly PRN, Chris Torres MD, 6 mg at 01/05/24 2221    metoprolol tartrate (LOPRESSOR) tablet 50 mg, 50 mg, Oral, BID, Chris Torres MD, 50 mg at 01/07/24 0952    naloxone 0.4 mg/mL injection 0.02 mg, 0.02 mg, Intravenous, PRN, Chris Torres MD    oxyCODONE immediate release tablet 15 mg, 15 mg, Oral, Q6H PRN, Chris Torres MD, 15 mg at 01/07/24 0952    polyethylene glycol packet 17 g, 17 g, Oral, BID, Chris Torres MD, 17 g at 01/07/24 0952    polyethylene glycol packet 17 g, 17 g, Oral, Daily PRN, Chris Torres MD    simethicone chewable tablet 80 mg, 1 tablet, Oral, QID PRN, Chris Torres MD    sodium chloride 0.9% flush 1-10 mL, 1-10 mL, Intravenous, Q12H PRN, Chris Torres MD      Objective:     Physical Exam:  Vitals:    10/11/23 1330   BP: (!) 122/58   Pulse: 74   Resp: 18   Temp: 97.7 °F (36.5 °C)   TempSrc: Temporal   SpO2: 99%     There is no height or weight on file to calculate BMI.    Physical Exam  Vitals and nursing note reviewed.   Constitutional:       Appearance: Normal appearance.   HENT:       Head: Normocephalic and atraumatic.      Nose: Rhinorrhea present.      Mouth/Throat:      Mouth: Mucous membranes are moist.   Eyes:      Extraocular Movements: Extraocular movements intact.      Conjunctiva/sclera: Conjunctivae normal.   Cardiovascular:      Rate and Rhythm: Normal rate and regular rhythm.      Pulses: Normal pulses.      Heart sounds: No murmur heard.  Pulmonary:      Effort: Pulmonary effort is normal.      Breath sounds: Normal breath sounds.      Comments: BBS CTA  Abdominal:      General: Bowel sounds are normal. There is no distension.      Palpations: Abdomen is soft.   Genitourinary:     Comments: Continent of bowel and bladder  Musculoskeletal:         General: No swelling. Normal range of motion.      Cervical back: Normal range of motion and neck supple.      Right lower leg: No edema.   Skin:     General: Skin is warm and dry.      Capillary Refill: Capillary refill takes 2 to 3 seconds.   Neurological:      Mental Status: She is alert and oriented to person, place, and time.      Motor: Weakness present.      Gait: Gait abnormal.   Psychiatric:         Mood and Affect: Mood normal.         Behavior: Behavior normal.         Thought Content: Thought content normal.         Review of Symptoms      Symptom Assessment (ESAS 0-10 Scale)  Pain:  8  Dyspnea:  0  Anxiety:  0  Nausea:  0  Depression:  5  Anorexia:  0  Fatigue:  5  Insomnia:  5  Restlessness:  3  Agitation:  0     CAM / Delirium:  Negative  Constipation:  Negative  Diarrhea:  Negative    Anxiety:  Is nervous/anxious  Constipation:  Constipation    Bowel Management Plan (BMP):  Yes (takes Senna S)      Comments:  [facility] 10/11 normal    Pain Assessment:    Location(s): none      Performance Status:  50    Karnofsky Performance Scale:  50%    Living Arrangements:  Lives alone    Psychosocial/Cultural:   See Palliative Psychosocial Note: Yes  **Primary  to Follow**  Palliative Care  Consult:  No    Spiritual:  F - Zaynab and Belief:  Oriental orthodox    I - Importance:  Highly  C - Community:  [facility]  A - Address in Care:  No spiritual needs identified       Time-Based Charting:  Yes  Chart Review: 10 minutes  Face to Face: 20 minutes  Symptom Assessment: 15 minutes  Advance Care Plannin minutes  Goals of Care: 5 minutes    Total Time Spent: 80 minutes        Advance Care Planning   Advance Directives:   Living Will: No        Oral Declaration: No    LaPOST: No (Reviewed with patient; left form with patient to complete)    Do Not Resuscitate Status: No    Medical Power of : No (Reviewed POA form with patient and left form with her to complete)        Oral Declaration: No    Agent's Name:  Dallas Domingo-Son   Agent's Contact Number:  [phone] cell    Decision Making:  Patient answered questions  Goals of Care: The patient endorses that what is most important right now is to focus on spending time at home, avoiding the hospital, remaining as independent as possible, symptom/pain control, and comfort and QOL     Accordingly, we have decided that the best plan to meet the patient's goals includes continuing with treatment           Labs:  CBC:   WBC   Date Value Ref Range Status   2024 7.01 3.90 - 12.70 K/uL Final         Hemoglobin   Date Value Ref Range Status   2024 7.9 (L) 12.0 - 16.0 g/dL Final         POC Hematocrit   Date Value Ref Range Status   2024 27 (L) 36 - 54 %PCV Final     Hematocrit   Date Value Ref Range Status   2024 26.0 (L) 37.0 - 48.5 % Final         MCV   Date Value Ref Range Status   2024 79 (L) 82 - 98 fL Final         Platelets   Date Value Ref Range Status   2024 203 150 - 450 K/uL Final       LFT:   Lab Results   Component Value Date    AST 17 2024    ALKPHOS 170 (H) 2024    BILITOT 0.3 2024       Albumin:   Albumin   Date Value Ref Range Status   2024 2.1 (L) 3.5 - 5.2 g/dL Final    05/07/2018 3.3  Final     Protein:   Total Protein   Date Value Ref Range Status   01/07/2024 6.1 6.0 - 8.4 g/dL Final       Radiology:  I have reviewed all pertinent imaging results/findings within the past 24 hours.      Assessment:     1. Palliative care encounter    2. Staghorn calculus    3. History of ESBL Klebsiella pneumoniae infection    4. Malignant carcinoid tumor of midgut    5. Counseling regarding advance care planning and goals of care    6. Chronic pain syndrome    7. Chronic diastolic heart failure with preserved ejection fraction        Follow Up Appointments:   Future Appointments   Date Time Provider Department Center   2/1/2024  8:00 AM Kristen Ferrer NP [address]       Patient and family agreed via verbal consent to access medical records and for assessment and treatment during this visit.    Kristen Ferrer DNP, FNP-C  [email or web address]  [phone]

## 2023-10-16 ENCOUNTER — OUTPATIENT CASE MANAGEMENT (OUTPATIENT)
Dept: ADMINISTRATIVE | Facility: OTHER | Age: 73
End: 2023-10-16
Payer: MEDICARE

## 2023-10-17 ENCOUNTER — PATIENT OUTREACH (OUTPATIENT)
Dept: ADMINISTRATIVE | Facility: CLINIC | Age: 73
End: 2023-10-17
Payer: MEDICARE

## 2023-10-17 ENCOUNTER — EXTERNAL HOSPITAL ADMISSION (OUTPATIENT)
Dept: ADMINISTRATIVE | Facility: CLINIC | Age: 73
End: 2023-10-17
Payer: MEDICARE

## 2023-10-30 NOTE — PROGRESS NOTES
"[facility]    CC: imaging abnormalities    HPI 71 y.o. 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 No change in bowel habits, nausea, emesis, melena, hematochezia.    Physical Examination  /62   Pulse 66   Temp 98.5 °F (36.9 °C)   Resp 16   Ht 5' 6" (1.676 m)   Wt 83 kg (182 lb 15.7 oz)   LMP  (LMP Unknown)   SpO2 98%   Breastfeeding No   BMI 29.53 kg/m²   General appearance: alert, cooperative, no distress  HENT: Normocephalic, atraumatic, neck symmetrical, no nasal discharge   Abdomen: soft, tender; non-distended, dullness to percussion, bowel sounds normoactive; no organomegaly  Extremities: extremities symmetric; no clubbing, cyanosis, or edema  Neurologic: Alert and oriented X 3, normal sensation, normal coordination    Recent Labs   Lab 03/03/22  0518   WBC 4.60   RBC 3.96*   HGB 10.7*   HCT 34.7*      MCV 88   MCH 27.0   MCHC 30.8*       Recent Labs   Lab 03/03/22  0518      K 4.3      CO2 22*   GLU 93   BUN 19   CREATININE 1.1     Recent Labs   Lab 03/03/22  0518   PROT 7.2   ALBUMIN 3.2*   BILITOT 0.6   AST 13   ALT 9*   ALKPHOS 114       Review and summarization of old records    Assessment:   71 y.o. 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    Previous leslie, ERCP for choledocolithiasis 11/2021.    Abdominal pain much improved. AlkPhos, T bili, AST/ALT unremarkable.    Plan:  -will follow up MRCP  -supportive care  -surgery prefers octreotide pre-ERCP with NET hx and possible surgical intervention post-ERCP given choledocholithiasis recurrence      Attestation to follow which may differ from above plan. Please appreciate.    Lincoln Maki MD  [facility]   Enbrel Pregnancy And Lactation Text: This medication is Pregnancy Category B and is considered safe during pregnancy. It is unknown if this medication is excreted in breast milk.

## 2023-11-01 ENCOUNTER — EXTERNAL HOME HEALTH (OUTPATIENT)
Dept: HOME HEALTH SERVICES | Facility: HOSPITAL | Age: 73
End: 2023-11-01
Payer: MEDICARE

## 2023-11-06 ENCOUNTER — NURSE TRIAGE (OUTPATIENT)
Dept: ADMINISTRATIVE | Facility: CLINIC | Age: 73
End: 2023-11-06
Payer: MEDICARE

## 2023-11-06 NOTE — TELEPHONE ENCOUNTER
OOC RN Dr. Wei  Patient went to  and they just sent her home.  She is treating CA.  Back Pain.  Kidney stone pain. 10/10  Care advise  is to call 911   today.  For any new or worsening   Patient disconnected call. Could not complete triage.    Reason for Disposition   Shock suspected (e.g., cold/pale/clammy skin, too weak to stand, low BP, rapid pulse)    Additional Information   Negative: Passed out (i.e., fainted, collapsed and was not responding)    Protocols used: Back Pain-A-[place]

## 2023-11-06 NOTE — TELEPHONE ENCOUNTER
Pt calling line, states that she is too weak to walk. States she has had 4 episodes of diarrhea, states she is by herself and feels too weak to stand and needs to go to hospital.  Pt noted to sound winded and very upset, states she has kidney stone pain too. States she is having diarrhea on herself. Advised per protocol CALL 911 NOW. Pt states she doesn't think she can do it. Gave permission for RN to call 911.   Spoke with  812 with [facility] 911 dispatch. Gave information that was confirmed with pt, states they are on the way.   On the line with pt till 911 arrives.   EMS on scene at 846a.  Will route message.     Reason for Disposition   Shock suspected (e.g., cold/pale/clammy skin, too weak to stand, low BP, rapid pulse)    Protocols used: Diarrhea-A-[place]

## 2023-11-12 NOTE — PROVIDER PROGRESS NOTES - EMERGENCY DEPT.
Encounter Date: 12/6/2018    ED Physician Progress Notes        Physician Note:   VSS.  Will place in obs for FLORECITA, ongoing UTI, consultation with palliative care.     
by mouth daily for 20 days May increase to 1 tablet twice daily in 3 days if no improvement        Past Medical History:   Diagnosis Date    Allergic rhinitis     Anxiety     Depression     Hypertension     Schizophrenia ([address])     ?     Type 2 diabetes mellitus (HCC)         Past Surgical History:   Procedure Laterality Date    FRACTURE SURGERY          Social History     Socioeconomic History    Marital status: Single   Tobacco Use    Smoking status: Never    Smokeless tobacco: Never   Vaping Use    Vaping Use: Never used   Substance and Sexual Activity    Alcohol use: Never    Drug use: Never    Sexual activity: Yes     Partners: Male     Birth control/protection: None   Social History Narrative    Abuse: Feels safe at home, no history of physical abuse, no history of sexual abuse      Social Determinants of Health     Financial Resource Strain: High Risk (6/7/2023)    Overall Financial Resource Strain (CARDIA)     Difficulty of Paying Living Expenses: Hard   Food Insecurity: Food Insecurity Present (6/7/2023)    Hunger Vital Sign     Worried About Running Out of Food in the Last Year: Often true     Ran Out of Food in the Last Year: Sometimes true   Transportation Needs: Unknown (6/7/2023)    [facility] Transportation     Lack of Transportation (Non-Medical): No   Housing Stability: Unknown (6/7/2023)    Housing Stability Vital Sign     Unstable Housing in the Last Year: No        Previous Medications    ACETAMINOPHEN (TYLENOL) 500 MG TABLET    Take 1 tablet by mouth every 6 hours as needed for Pain    AMLODIPINE (NORVASC) 5 MG TABLET    Take 1 tablet by mouth daily    BLOOD PRESSURE MONITOR KIT    1 each by Does not apply route daily as needed (blood pressure check)    DOCUSATE SODIUM (COLACE) 100 MG CAPSULE    Take 1 capsule by mouth 2 times daily as needed for Constipation    ESCITALOPRAM (LEXAPRO) 20 MG TABLET    Take 1 tablet by mouth daily    FERROUS SULFATE (IRON 325) 325 (65 FE) MG TABLET    Take 1 tablet

## 2023-12-11 DIAGNOSIS — C7B.00 METASTATIC CARCINOID TUMOR: Primary | ICD-10-CM

## 2023-12-11 DIAGNOSIS — C7B.00 METASTATIC CARCINOID TUMOR: ICD-10-CM

## 2023-12-11 RX ORDER — OXYCODONE HYDROCHLORIDE 15 MG/1
15 TABLET ORAL EVERY 6 HOURS PRN
Qty: 120 TABLET | Refills: 0 | Status: SHIPPED | OUTPATIENT
Start: 2023-12-11 | End: 2023-12-11 | Stop reason: SDUPTHER

## 2023-12-11 RX ORDER — OXYCODONE HYDROCHLORIDE 15 MG/1
15 TABLET ORAL EVERY 6 HOURS PRN
Qty: 120 TABLET | Refills: 0 | Status: ON HOLD | OUTPATIENT
Start: 2023-12-11 | End: 2024-01-10

## 2023-12-13 DIAGNOSIS — Z78.0 MENOPAUSE: ICD-10-CM

## 2023-12-28 NOTE — TELEPHONE ENCOUNTER
Called and spoke to ROCKY Guillen at [facility]. Informed that the Pulm MD will review patient's recent CT scan results when he returns to clinic and provide a recommendation. ROCKY Guillen voiced understanding and is appreciative of the call. ROCKY Guillen would like to be called with the Pulm MD's recommendations.    Pt reports severe abdominal pain that is intermittent, beginning Friday. Also reports feeling weak. Advised, per protocol with 911 precautions. Verbalizes understanding but would like follow up in this regard.    Reason for Disposition   [1] SEVERE pain AND [2] age > 60 years    Additional Information   Negative: Shock suspected (e.g., cold/pale/clammy skin, too weak to stand, low BP, rapid pulse)   Negative: Difficult to awaken or acting confused (e.g., disoriented, slurred speech)   Negative: Passed out (i.e., lost consciousness, collapsed and was not responding)   Negative: Sounds like a life-threatening emergency to the triager    Protocols used: ABDOMINAL PAIN - FEMALE-A-AH

## 2024-01-03 ENCOUNTER — HOSPITAL ENCOUNTER (OUTPATIENT)
Facility: HOSPITAL | Age: 74
Discharge: NURSING, REHAB OR LONG-TERM CARE | End: 2024-01-11
Attending: STUDENT IN AN ORGANIZED HEALTH CARE EDUCATION/TRAINING PROGRAM | Admitting: FAMILY MEDICINE
Payer: MEDICARE

## 2024-01-03 DIAGNOSIS — R07.9 CHEST PAIN: ICD-10-CM

## 2024-01-03 DIAGNOSIS — R62.7 FAILURE TO THRIVE IN ADULT: ICD-10-CM

## 2024-01-03 DIAGNOSIS — R93.7 ABNORMAL CT SCAN, LUMBAR SPINE: ICD-10-CM

## 2024-01-03 DIAGNOSIS — C7B.00 METASTATIC CARCINOID TUMOR: ICD-10-CM

## 2024-01-03 DIAGNOSIS — R13.10 DYSPHAGIA, UNSPECIFIED TYPE: ICD-10-CM

## 2024-01-03 DIAGNOSIS — L24.A2 IRRITANT CONTACT DERMATITIS DUE TO FECAL, URINARY OR DUAL INCONTINENCE: Primary | ICD-10-CM

## 2024-01-03 PROBLEM — E44.0 MODERATE PROTEIN-CALORIE MALNUTRITION: Chronic | Status: ACTIVE | Noted: 2021-11-15

## 2024-01-03 PROBLEM — G89.4 CHRONIC PAIN SYNDROME: Chronic | Status: ACTIVE | Noted: 2018-12-02

## 2024-01-03 PROBLEM — K59.09 OTHER CONSTIPATION: Status: ACTIVE | Noted: 2024-01-03

## 2024-01-03 PROBLEM — R82.90 ABNORMAL URINALYSIS: Status: ACTIVE | Noted: 2024-01-03

## 2024-01-03 PROBLEM — N30.00 ACUTE CYSTITIS WITHOUT HEMATURIA: Status: ACTIVE | Noted: 2024-01-03

## 2024-01-03 PROBLEM — I50.32 CHRONIC DIASTOLIC (CONGESTIVE) HEART FAILURE: Chronic | Status: ACTIVE | Noted: 2022-03-02

## 2024-01-03 PROBLEM — K59.09 OTHER CONSTIPATION: Chronic | Status: ACTIVE | Noted: 2024-01-03

## 2024-01-03 LAB
ALBUMIN SERPL BCP-MCNC: 2.1 G/DL (ref 3.5–5.2)
ALLENS TEST: ABNORMAL
ALP SERPL-CCNC: 142 U/L (ref 55–135)
ALT SERPL W/O P-5'-P-CCNC: 5 U/L (ref 10–44)
ANION GAP SERPL CALC-SCNC: 11 MMOL/L (ref 8–16)
AST SERPL-CCNC: 24 U/L (ref 10–40)
BACTERIA #/AREA URNS AUTO: ABNORMAL /HPF
BASOPHILS # BLD AUTO: 0.04 K/UL (ref 0–0.2)
BASOPHILS NFR BLD: 0.3 % (ref 0–1.9)
BILIRUB SERPL-MCNC: 0.5 MG/DL (ref 0.1–1)
BILIRUB UR QL STRIP: NEGATIVE
BNP SERPL-MCNC: 33 PG/ML (ref 0–99)
BUN SERPL-MCNC: 31 MG/DL (ref 8–23)
BUN SERPL-MCNC: 33 MG/DL (ref 6–30)
BUN SERPL-MCNC: 52 MG/DL (ref 6–30)
BUN SERPL-MCNC: 53 MG/DL (ref 6–30)
CALCIUM SERPL-MCNC: 8.3 MG/DL (ref 8.7–10.5)
CHLORIDE SERPL-SCNC: 106 MMOL/L (ref 95–110)
CHLORIDE SERPL-SCNC: 110 MMOL/L (ref 95–110)
CHLORIDE SERPL-SCNC: 111 MMOL/L (ref 95–110)
CHLORIDE SERPL-SCNC: 111 MMOL/L (ref 95–110)
CK SERPL-CCNC: 213 U/L (ref 20–180)
CLARITY UR REFRACT.AUTO: ABNORMAL
CO2 SERPL-SCNC: 19 MMOL/L (ref 23–29)
COLOR UR AUTO: YELLOW
CREAT SERPL-MCNC: 1.1 MG/DL (ref 0.5–1.4)
CREAT SERPL-MCNC: 1.3 MG/DL (ref 0.5–1.4)
CREAT SERPL-MCNC: 1.5 MG/DL (ref 0.5–1.4)
CREAT SERPL-MCNC: 1.5 MG/DL (ref 0.5–1.4)
DIFFERENTIAL METHOD BLD: ABNORMAL
EOSINOPHIL # BLD AUTO: 0 K/UL (ref 0–0.5)
EOSINOPHIL NFR BLD: 0 % (ref 0–8)
ERYTHROCYTE [DISTWIDTH] IN BLOOD BY AUTOMATED COUNT: 20.7 % (ref 11.5–14.5)
EST. GFR  (NO RACE VARIABLE): 53.7 ML/MIN/1.73 M^2
GLUCOSE SERPL-MCNC: 100 MG/DL (ref 70–110)
GLUCOSE SERPL-MCNC: 110 MG/DL (ref 70–110)
GLUCOSE SERPL-MCNC: 114 MG/DL (ref 70–110)
GLUCOSE SERPL-MCNC: 116 MG/DL (ref 70–110)
GLUCOSE UR QL STRIP: NEGATIVE
HCO3 UR-SCNC: 22.7 MMOL/L (ref 24–28)
HCT VFR BLD AUTO: 32.4 % (ref 37–48.5)
HCT VFR BLD CALC: 27 %PCV (ref 36–54)
HCT VFR BLD CALC: 32 %PCV (ref 36–54)
HCT VFR BLD CALC: 34 %PCV (ref 36–54)
HGB BLD-MCNC: 9.5 G/DL (ref 12–16)
HGB UR QL STRIP: ABNORMAL
HYALINE CASTS UR QL AUTO: 9 /LPF
IMM GRANULOCYTES # BLD AUTO: 0.13 K/UL (ref 0–0.04)
IMM GRANULOCYTES NFR BLD AUTO: 0.9 % (ref 0–0.5)
INFLUENZA A, MOLECULAR: NOT DETECTED
INFLUENZA B, MOLECULAR: NOT DETECTED
KETONES UR QL STRIP: NEGATIVE
LACTATE SERPL-SCNC: 0.8 MMOL/L (ref 0.5–2.2)
LEUKOCYTE ESTERASE UR QL STRIP: ABNORMAL
LIPASE SERPL-CCNC: 19 U/L (ref 4–60)
LYMPHOCYTES # BLD AUTO: 1.3 K/UL (ref 1–4.8)
LYMPHOCYTES NFR BLD: 8.8 % (ref 18–48)
MAGNESIUM SERPL-MCNC: 1.7 MG/DL (ref 1.6–2.6)
MCH RBC QN AUTO: 24.1 PG (ref 27–31)
MCHC RBC AUTO-ENTMCNC: 29.3 G/DL (ref 32–36)
MCV RBC AUTO: 82 FL (ref 82–98)
MICROSCOPIC COMMENT: ABNORMAL
MONOCYTES # BLD AUTO: 1.5 K/UL (ref 0.3–1)
MONOCYTES NFR BLD: 10.5 % (ref 4–15)
NEUTROPHILS # BLD AUTO: 11.6 K/UL (ref 1.8–7.7)
NEUTROPHILS NFR BLD: 79.5 % (ref 38–73)
NITRITE UR QL STRIP: NEGATIVE
NRBC BLD-RTO: 0 /100 WBC
PCO2 BLDA: 39.7 MMHG (ref 35–45)
PH SMN: 7.37 [PH] (ref 7.35–7.45)
PH UR STRIP: 5 [PH] (ref 5–8)
PLATELET # BLD AUTO: 317 K/UL (ref 150–450)
PMV BLD AUTO: 11 FL (ref 9.2–12.9)
PO2 BLDA: 66 MMHG (ref 40–60)
POC BE: -3 MMOL/L
POC IONIZED CALCIUM: 0.94 MMOL/L (ref 1.06–1.42)
POC IONIZED CALCIUM: 0.96 MMOL/L (ref 1.06–1.42)
POC IONIZED CALCIUM: 1.24 MMOL/L (ref 1.06–1.42)
POC SATURATED O2: 92 % (ref 95–100)
POC TCO2 (MEASURED): 22 MMOL/L (ref 23–29)
POC TCO2 (MEASURED): 23 MMOL/L (ref 23–29)
POC TCO2 (MEASURED): 25 MMOL/L (ref 23–27)
POC TCO2: 24 MMOL/L (ref 24–29)
POCT GLUCOSE: 117 MG/DL (ref 70–110)
POTASSIUM BLD-SCNC: 4.6 MMOL/L (ref 3.5–5.1)
POTASSIUM BLD-SCNC: >9 MMOL/L (ref 3.5–5.1)
POTASSIUM BLD-SCNC: >9 MMOL/L (ref 3.5–5.1)
POTASSIUM SERPL-SCNC: 4.2 MMOL/L (ref 3.5–5.1)
PROT SERPL-MCNC: 6.6 G/DL (ref 6–8.4)
PROT UR QL STRIP: ABNORMAL
RBC # BLD AUTO: 3.94 M/UL (ref 4–5.4)
RBC #/AREA URNS AUTO: 4 /HPF (ref 0–4)
RSV AG BY MOLECULAR METHOD: NOT DETECTED
SAMPLE: ABNORMAL
SARS-COV-2 RNA RESP QL NAA+PROBE: NOT DETECTED
SITE: ABNORMAL
SODIUM BLD-SCNC: 131 MMOL/L (ref 136–145)
SODIUM BLD-SCNC: 132 MMOL/L (ref 136–145)
SODIUM BLD-SCNC: 140 MMOL/L (ref 136–145)
SODIUM SERPL-SCNC: 141 MMOL/L (ref 136–145)
SP GR UR STRIP: 1.02 (ref 1–1.03)
TROPONIN I SERPL DL<=0.01 NG/ML-MCNC: 0.16 NG/ML (ref 0–0.03)
TSH SERPL DL<=0.005 MIU/L-ACNC: 1.32 UIU/ML (ref 0.4–4)
TSH SERPL DL<=0.005 MIU/L-ACNC: 1.32 UIU/ML (ref 0.4–4)
URN SPEC COLLECT METH UR: ABNORMAL
WBC # BLD AUTO: 14.54 K/UL (ref 3.9–12.7)
WBC #/AREA URNS AUTO: >100 /HPF (ref 0–5)
WBC CLUMPS UR QL AUTO: ABNORMAL

## 2024-01-03 PROCEDURE — 84484 ASSAY OF TROPONIN QUANT: CPT | Mod: HCNC | Performed by: STUDENT IN AN ORGANIZED HEALTH CARE EDUCATION/TRAINING PROGRAM

## 2024-01-03 PROCEDURE — 99285 EMERGENCY DEPT VISIT HI MDM: CPT | Mod: 25,HCNC

## 2024-01-03 PROCEDURE — 83690 ASSAY OF LIPASE: CPT | Mod: HCNC | Performed by: STUDENT IN AN ORGANIZED HEALTH CARE EDUCATION/TRAINING PROGRAM

## 2024-01-03 PROCEDURE — 25000003 PHARM REV CODE 250: Mod: HCNC | Performed by: STUDENT IN AN ORGANIZED HEALTH CARE EDUCATION/TRAINING PROGRAM

## 2024-01-03 PROCEDURE — 80053 COMPREHEN METABOLIC PANEL: CPT | Mod: HCNC | Performed by: STUDENT IN AN ORGANIZED HEALTH CARE EDUCATION/TRAINING PROGRAM

## 2024-01-03 PROCEDURE — 82330 ASSAY OF CALCIUM: CPT | Mod: HCNC

## 2024-01-03 PROCEDURE — 96360 HYDRATION IV INFUSION INIT: CPT | Mod: 59,HCNC

## 2024-01-03 PROCEDURE — 93005 ELECTROCARDIOGRAM TRACING: CPT | Mod: HCNC

## 2024-01-03 PROCEDURE — 83735 ASSAY OF MAGNESIUM: CPT | Mod: HCNC | Performed by: STUDENT IN AN ORGANIZED HEALTH CARE EDUCATION/TRAINING PROGRAM

## 2024-01-03 PROCEDURE — 96375 TX/PRO/DX INJ NEW DRUG ADDON: CPT | Mod: HCNC

## 2024-01-03 PROCEDURE — 85025 COMPLETE CBC W/AUTO DIFF WBC: CPT | Mod: HCNC | Performed by: STUDENT IN AN ORGANIZED HEALTH CARE EDUCATION/TRAINING PROGRAM

## 2024-01-03 PROCEDURE — 82550 ASSAY OF CK (CPK): CPT | Mod: HCNC | Performed by: STUDENT IN AN ORGANIZED HEALTH CARE EDUCATION/TRAINING PROGRAM

## 2024-01-03 PROCEDURE — 25500020 PHARM REV CODE 255: Mod: HCNC | Performed by: STUDENT IN AN ORGANIZED HEALTH CARE EDUCATION/TRAINING PROGRAM

## 2024-01-03 PROCEDURE — 63600175 PHARM REV CODE 636 W HCPCS: Mod: HCNC | Performed by: STUDENT IN AN ORGANIZED HEALTH CARE EDUCATION/TRAINING PROGRAM

## 2024-01-03 PROCEDURE — 96365 THER/PROPH/DIAG IV INF INIT: CPT | Mod: HCNC

## 2024-01-03 PROCEDURE — 80047 BASIC METABLC PNL IONIZED CA: CPT | Mod: HCNC

## 2024-01-03 PROCEDURE — 82803 BLOOD GASES ANY COMBINATION: CPT | Mod: HCNC

## 2024-01-03 PROCEDURE — 87086 URINE CULTURE/COLONY COUNT: CPT | Mod: HCNC | Performed by: STUDENT IN AN ORGANIZED HEALTH CARE EDUCATION/TRAINING PROGRAM

## 2024-01-03 PROCEDURE — 84443 ASSAY THYROID STIM HORMONE: CPT | Mod: HCNC | Performed by: STUDENT IN AN ORGANIZED HEALTH CARE EDUCATION/TRAINING PROGRAM

## 2024-01-03 PROCEDURE — 82962 GLUCOSE BLOOD TEST: CPT | Mod: HCNC

## 2024-01-03 PROCEDURE — 96361 HYDRATE IV INFUSION ADD-ON: CPT | Mod: HCNC

## 2024-01-03 PROCEDURE — 83605 ASSAY OF LACTIC ACID: CPT | Mod: HCNC | Performed by: STUDENT IN AN ORGANIZED HEALTH CARE EDUCATION/TRAINING PROGRAM

## 2024-01-03 PROCEDURE — G0378 HOSPITAL OBSERVATION PER HR: HCPCS | Mod: HCNC

## 2024-01-03 PROCEDURE — 0241U SARS-COV2 (COVID) WITH FLU/RSV BY PCR: CPT | Mod: HCNC | Performed by: STUDENT IN AN ORGANIZED HEALTH CARE EDUCATION/TRAINING PROGRAM

## 2024-01-03 PROCEDURE — 87077 CULTURE AEROBIC IDENTIFY: CPT | Mod: HCNC | Performed by: STUDENT IN AN ORGANIZED HEALTH CARE EDUCATION/TRAINING PROGRAM

## 2024-01-03 PROCEDURE — 81001 URINALYSIS AUTO W/SCOPE: CPT | Mod: HCNC | Performed by: STUDENT IN AN ORGANIZED HEALTH CARE EDUCATION/TRAINING PROGRAM

## 2024-01-03 PROCEDURE — 87088 URINE BACTERIA CULTURE: CPT | Mod: HCNC | Performed by: STUDENT IN AN ORGANIZED HEALTH CARE EDUCATION/TRAINING PROGRAM

## 2024-01-03 PROCEDURE — 83880 ASSAY OF NATRIURETIC PEPTIDE: CPT | Mod: HCNC | Performed by: STUDENT IN AN ORGANIZED HEALTH CARE EDUCATION/TRAINING PROGRAM

## 2024-01-03 PROCEDURE — 87040 BLOOD CULTURE FOR BACTERIA: CPT | Mod: HCNC | Performed by: STUDENT IN AN ORGANIZED HEALTH CARE EDUCATION/TRAINING PROGRAM

## 2024-01-03 PROCEDURE — 93010 ELECTROCARDIOGRAM REPORT: CPT | Mod: HCNC,,, | Performed by: INTERNAL MEDICINE

## 2024-01-03 PROCEDURE — 96367 TX/PROPH/DG ADDL SEQ IV INF: CPT | Mod: HCNC

## 2024-01-03 PROCEDURE — 87186 SC STD MICRODIL/AGAR DIL: CPT | Mod: HCNC | Performed by: STUDENT IN AN ORGANIZED HEALTH CARE EDUCATION/TRAINING PROGRAM

## 2024-01-03 PROCEDURE — 99900035 HC TECH TIME PER 15 MIN (STAT): Mod: HCNC

## 2024-01-03 RX ORDER — DIPHENOXYLATE HYDROCHLORIDE AND ATROPINE SULFATE 2.5; .025 MG/1; MG/1
1 TABLET ORAL DAILY PRN
Status: ON HOLD | COMMUNITY
End: 2024-01-09 | Stop reason: HOSPADM

## 2024-01-03 RX ORDER — DIPHENHYDRAMINE HYDROCHLORIDE 50 MG/ML
50 INJECTION, SOLUTION INTRAMUSCULAR; INTRAVENOUS
Status: COMPLETED | OUTPATIENT
Start: 2024-01-03 | End: 2024-01-03

## 2024-01-03 RX ORDER — MAG HYDROX/ALUMINUM HYD/SIMETH 200-200-20
30 SUSPENSION, ORAL (FINAL DOSE FORM) ORAL 4 TIMES DAILY PRN
Status: DISCONTINUED | OUTPATIENT
Start: 2024-01-03 | End: 2024-01-11 | Stop reason: HOSPADM

## 2024-01-03 RX ORDER — MULTIVITAMIN
1 TABLET ORAL DAILY
COMMUNITY

## 2024-01-03 RX ORDER — NALOXONE HCL 0.4 MG/ML
0.02 VIAL (ML) INJECTION
Status: DISCONTINUED | OUTPATIENT
Start: 2024-01-03 | End: 2024-01-11 | Stop reason: HOSPADM

## 2024-01-03 RX ORDER — SODIUM CHLORIDE 0.9 % (FLUSH) 0.9 %
1-10 SYRINGE (ML) INJECTION EVERY 12 HOURS PRN
Status: DISCONTINUED | OUTPATIENT
Start: 2024-01-03 | End: 2024-01-11 | Stop reason: HOSPADM

## 2024-01-03 RX ORDER — POLYETHYLENE GLYCOL 3350 17 G/17G
17 POWDER, FOR SOLUTION ORAL 2 TIMES DAILY
Status: DISCONTINUED | OUTPATIENT
Start: 2024-01-03 | End: 2024-01-11 | Stop reason: HOSPADM

## 2024-01-03 RX ORDER — TALC
6 POWDER (GRAM) TOPICAL NIGHTLY PRN
Status: DISCONTINUED | OUTPATIENT
Start: 2024-01-03 | End: 2024-01-11 | Stop reason: HOSPADM

## 2024-01-03 RX ORDER — ACETAMINOPHEN 325 MG/1
650 TABLET ORAL EVERY 6 HOURS
COMMUNITY

## 2024-01-03 RX ORDER — DEXAMETHASONE SODIUM PHOSPHATE 4 MG/ML
12 INJECTION, SOLUTION INTRA-ARTICULAR; INTRALESIONAL; INTRAMUSCULAR; INTRAVENOUS; SOFT TISSUE
Status: COMPLETED | OUTPATIENT
Start: 2024-01-03 | End: 2024-01-03

## 2024-01-03 RX ORDER — LOSARTAN POTASSIUM 100 MG/1
100 TABLET ORAL DAILY
Status: ON HOLD | COMMUNITY
End: 2024-01-09 | Stop reason: HOSPADM

## 2024-01-03 RX ORDER — HYDROMORPHONE HYDROCHLORIDE 1 MG/ML
0.5 INJECTION, SOLUTION INTRAMUSCULAR; INTRAVENOUS; SUBCUTANEOUS
Status: COMPLETED | OUTPATIENT
Start: 2024-01-03 | End: 2024-01-03

## 2024-01-03 RX ORDER — LINEZOLID 2 MG/ML
600 INJECTION, SOLUTION INTRAVENOUS
Status: COMPLETED | OUTPATIENT
Start: 2024-01-03 | End: 2024-01-03

## 2024-01-03 RX ORDER — METHYLPREDNISOLONE SOD SUCC 125 MG
125 VIAL (EA) INJECTION
Status: DISCONTINUED | OUTPATIENT
Start: 2024-01-03 | End: 2024-01-03

## 2024-01-03 RX ORDER — METOPROLOL TARTRATE 50 MG/1
50 TABLET ORAL 2 TIMES DAILY
COMMUNITY

## 2024-01-03 RX ORDER — POLYETHYLENE GLYCOL 3350 17 G/17G
17 POWDER, FOR SOLUTION ORAL DAILY
Status: ON HOLD | COMMUNITY
End: 2024-01-09 | Stop reason: HOSPADM

## 2024-01-03 RX ORDER — ONDANSETRON 8 MG/1
8 TABLET, ORALLY DISINTEGRATING ORAL EVERY 8 HOURS PRN
Status: DISCONTINUED | OUTPATIENT
Start: 2024-01-03 | End: 2024-01-03

## 2024-01-03 RX ORDER — METOPROLOL TARTRATE 50 MG/1
50 TABLET ORAL 2 TIMES DAILY
Status: DISCONTINUED | OUTPATIENT
Start: 2024-01-03 | End: 2024-01-11 | Stop reason: HOSPADM

## 2024-01-03 RX ORDER — ACETAMINOPHEN 325 MG/1
650 TABLET ORAL EVERY 4 HOURS PRN
Status: DISCONTINUED | OUTPATIENT
Start: 2024-01-03 | End: 2024-01-11 | Stop reason: HOSPADM

## 2024-01-03 RX ORDER — POLYETHYLENE GLYCOL 3350 17 G/17G
17 POWDER, FOR SOLUTION ORAL DAILY PRN
Status: DISCONTINUED | OUTPATIENT
Start: 2024-01-03 | End: 2024-01-11 | Stop reason: HOSPADM

## 2024-01-03 RX ORDER — SIMETHICONE 80 MG
1 TABLET,CHEWABLE ORAL 4 TIMES DAILY PRN
Status: DISCONTINUED | OUTPATIENT
Start: 2024-01-03 | End: 2024-01-11 | Stop reason: HOSPADM

## 2024-01-03 RX ORDER — ENOXAPARIN SODIUM 100 MG/ML
40 INJECTION SUBCUTANEOUS EVERY 24 HOURS
Status: DISCONTINUED | OUTPATIENT
Start: 2024-01-04 | End: 2024-01-11 | Stop reason: HOSPADM

## 2024-01-03 RX ORDER — ACETAMINOPHEN 325 MG/1
650 TABLET ORAL EVERY 8 HOURS PRN
Status: DISCONTINUED | OUTPATIENT
Start: 2024-01-03 | End: 2024-01-11 | Stop reason: HOSPADM

## 2024-01-03 RX ORDER — SODIUM CHLORIDE, SODIUM LACTATE, POTASSIUM CHLORIDE, CALCIUM CHLORIDE 600; 310; 30; 20 MG/100ML; MG/100ML; MG/100ML; MG/100ML
INJECTION, SOLUTION INTRAVENOUS CONTINUOUS
Status: ACTIVE | OUTPATIENT
Start: 2024-01-03 | End: 2024-01-04

## 2024-01-03 RX ORDER — SENNOSIDES 8.6 MG/1
1 TABLET ORAL DAILY
Status: ON HOLD | COMMUNITY
End: 2024-01-09 | Stop reason: HOSPADM

## 2024-01-03 RX ADMIN — SODIUM CHLORIDE, POTASSIUM CHLORIDE, SODIUM LACTATE AND CALCIUM CHLORIDE 1000 ML: 600; 310; 30; 20 INJECTION, SOLUTION INTRAVENOUS at 04:01

## 2024-01-03 RX ADMIN — DIPHENHYDRAMINE HYDROCHLORIDE 50 MG: 50 INJECTION, SOLUTION INTRAMUSCULAR; INTRAVENOUS at 06:01

## 2024-01-03 RX ADMIN — DEXAMETHASONE SODIUM PHOSPHATE 12 MG: 4 INJECTION INTRA-ARTICULAR; INTRALESIONAL; INTRAMUSCULAR; INTRAVENOUS; SOFT TISSUE at 06:01

## 2024-01-03 RX ADMIN — SODIUM CHLORIDE, POTASSIUM CHLORIDE, SODIUM LACTATE AND CALCIUM CHLORIDE 1000 ML: 600; 310; 30; 20 INJECTION, SOLUTION INTRAVENOUS at 01:01

## 2024-01-03 RX ADMIN — ERTAPENEM 1 G: 1 INJECTION INTRAMUSCULAR; INTRAVENOUS at 04:01

## 2024-01-03 RX ADMIN — IOHEXOL 75 ML: 350 INJECTION, SOLUTION INTRAVENOUS at 07:01

## 2024-01-03 RX ADMIN — Medication 1 ENEMA: at 09:01

## 2024-01-03 RX ADMIN — POLYETHYLENE GLYCOL 3350 17 G: 17 POWDER, FOR SOLUTION ORAL at 09:01

## 2024-01-03 RX ADMIN — LINEZOLID 600 MG: 600 INJECTION, SOLUTION INTRAVENOUS at 03:01

## 2024-01-03 RX ADMIN — HYDROMORPHONE HYDROCHLORIDE 0.5 MG: 1 INJECTION, SOLUTION INTRAMUSCULAR; INTRAVENOUS; SUBCUTANEOUS at 04:01

## 2024-01-03 NOTE — ED NOTES
Patito COLLADO Chayo, a 73 y.o. female presents to the ED w/ complaint of failure to thrive, patient not eating or drinking at NH. Patient reports being nervous upon arrival to ER.     Triage note:  Chief Complaint   Patient presents with    Failure To Thrive     Refusing meds, food, fluid. Transfer from [facility].      Review of patient's allergies indicates:   Allergen Reactions    Contrast media Hives, Itching and Swelling    Epinephrine Anaphylaxis     Can cause  a Carcinoid Crisis    Ibuprofen Hives, Itching and Swelling    Zofran [ondansetron hcl] Itching     And multiple other reactions    Iodinated contrast media     Morphine Other (See Comments)    Sulfa (sulfonamide antibiotics) Hives, Itching and Swelling     Past Medical History:   Diagnosis Date    Anemia of chronic disease     Also with iron deficiency    Bacteremia due to Klebsiella pneumoniae     Cataract     Chronic diastolic (congestive) heart failure     Colon cancer     Encounter for blood transfusion     History of ESBL E. 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

## 2024-01-03 NOTE — Clinical Note
Diagnosis: Failure to thrive in adult [960127]   Future Attending Provider: MONIKA CHRISTIE [[id number]]   Admitting Provider:: MONIKA CHRISTIE [[id number]]

## 2024-01-03 NOTE — ED PROVIDER NOTES
"Source of History  patient, family, EMR, and NH records    Chief Complaint    Failure To Thrive (Refusing meds, food, fluid. Transfer from [facility] )      History of Present Illness    Patito Domingo is a 73 y.o. female presenting with concern for failure to thrive, refusing meds and food, was sent here from her nursing facility    She tells me that she was here for anxiety and she was feeling very anxious.  She does have some back pain that she points to the left side of her lateral side.  She cannot tell me much else about why she is here.  EMS/NH reports:     Decreasing ADLs, needing more assistance  Doctor saw and told her to come to ED  Normally up walking / eating  Not eating. They have noted alterations of constipation and diarrhea.  Nursing home notes that the patient had a left-sided ureteral stent placed June 14, 2023.    She wants to lay down and take pain pills and usually she is up and moving.  Thus, the team was concerned and sent her to ED for evaluation.    Past history includes DCHF EF 70%, carcinoid of colon with liver mets, ESBL E coli UTI, HTN, renal stones/pyelo and pontine CVA.    She has been in and out of numerous hospitals, more recently in [facility] for unclear reasons but most likely debility related..  Prior hospitalizations for UTI in association with kidney stones.  She had been on IV meropenem for some time as well.      Review of Systems    As per HPI and below:  Review of Systems:    Pertinent information obtained as above.  Otherwise limited due to:  limited in answering questions - just states "anxious" and then points to her L back      Past History    As per HPI and below:  Past Medical History:   Diagnosis Date    Anemia of chronic disease     Also with iron deficiency    Bacteremia due to Klebsiella pneumoniae     Cataract     Chronic diastolic (congestive) heart failure     Colon cancer     Encounter for blood transfusion     History of ESBL E. 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)        Past Surgical History:   Procedure Laterality Date    ABDOMINAL SURGERY      CATARACT EXTRACTION Left 10/2017     SECTION      CHOLECYSTECTOMY      COLON SURGERY      cystoscope      CYSTOSCOPY W/ RETROGRADES Right 10/10/2019    Procedure: CYSTOSCOPY, WITH RETROGRADE PYELOGRAM;  Surgeon: Gen Isbell MD;  Location: [facility] OR;  Service: Urology;  Laterality: Right;    ERCP N/A 2021    Procedure: ERCP (ENDOSCOPIC RETROGRADE CHOLANGIOPANCREATOGRAPHY);  Surgeon: Jayjay Rivera MD;  Location: [address]);  Service: Endoscopy;  Laterality: N/A;    ERCP N/A 3/4/2022    Procedure: ERCP (ENDOSCOPIC RETROGRADE CHOLANGIOPANCREATOGRAPHY);  Surgeon: Roby Virgen MD;  Location: [facility] ([address] [place] [address]);  Service: Endoscopy;  Laterality: N/A;    EYE SURGERY      HYSTERECTOMY  1996    LITHOTRIPSY      LIVER BIOPSY      carcinoid    URETEROSCOPY Right 10/10/2019    Procedure: URETEROSCOPY;  Surgeon: Gen Isbell MD;  Location: [facility] OR;  Service: Urology;  Laterality: Right;    UTERINE FIBROID SURGERY         Social History     Socioeconomic History    Marital status:    Occupational History    Occupation: disabled    Tobacco Use    Smoking status: Never    Smokeless tobacco: Never   Substance and Sexual Activity    Alcohol use: No     Alcohol/week: 0.0 standard drinks of alcohol    Drug use: No    Sexual activity: Not Currently   Social History  Narrative    Pt lives with son     Social Determinants of Health     Financial Resource Strain: High Risk (3/28/2023)    Overall Financial Resource Strain (CARDIA)     Difficulty of Paying Living Expenses: Hard   Food Insecurity: No Food Insecurity (3/8/2023)    Hunger Vital Sign     Worried About Running Out of Food in the Last Year: Never true     Ran Out of Food in the Last Year: Never true   Transportation Needs: No Transportation Needs (3/8/2023)    [facility]APARE - Transportation     Lack of Transportation (Medical): No     Lack of Transportation (Non-Medical): No   Physical Activity: Insufficiently Active (3/28/2023)    Exercise Vital Sign     Days of Exercise per Week: 1 day     Minutes of Exercise per Session: 30 min   Stress: No Stress Concern Present (3/28/2023)    [employer] [place] [employer] - Occupational Stress Questionnaire     Feeling of Stress : Not at all   Social Connections: Moderately Integrated (3/28/2023)    Social Connection and Isolation Panel [NHANES]     Frequency of Communication with Friends and Family: More than three times a week     Frequency of Social Gatherings with Friends and Family: Three times a week     Attends [employer] Services: More than 4 times per year     Active Member of Clubs or Organizations: Yes     Attends Club or Organization Meetings: More than 4 times per year     Marital Status:    Housing Stability: Unknown (3/8/2023)    Housing Stability Vital Sign     Unable to Pay for Housing in the Last Year: No     Unstable Housing in the Last Year: No       Family History   Problem Relation Age of Onset    Cancer Mother         unknown    Alzheimer's disease Father     Stroke Sister     No Known Problems Son     No Known Problems Son     No Known Problems Son     No Known Problems Son     Kidney disease Neg Hx        Review of patient's allergies indicates:   Allergen Reactions    Contrast media Hives, Itching and Swelling    Epinephrine Anaphylaxis     Can  "cause  a Carcinoid Crisis    Ibuprofen Hives, Itching and Swelling    Zofran [ondansetron hcl] Itching     And multiple other reactions    Iodinated contrast media     Morphine Other (See Comments)    Sulfa (sulfonamide antibiotics) Hives, Itching and Swelling       No current facility-administered medications on file prior to encounter.     Current Outpatient Medications on File Prior to Encounter   Medication Sig Dispense Refill    amLODIPine (NORVASC) 10 MG tablet Take 10 mg by mouth once daily.      carvediloL (COREG) 6.25 MG tablet Take 1 tablet (6.25 mg total) by mouth 2 (two) times daily. 60 tablet 2    cholestyramine (QUESTRAN) 4 gram packet Take 4 g by mouth 2 (two) times daily as needed (Diarrhea).      DAILY PROBIOTIC, S. BOULARDII, 250 mg capsule Take 250 mg by mouth once daily.      hydrocortisone 1 % lotion Apply 1 Squirt topically 2 (two) times daily.      irbesartan (AVAPRO) 300 MG tablet Take 300 mg by mouth once daily.      loperamide (IMODIUM) 2 mg capsule Take 1 capsule (2 mg total) by mouth 2 (two) times daily as needed for Diarrhea. 30 capsule 1    meclizine (ANTIVERT) 25 mg tablet Take 1 tablet (25 mg total) by mouth 3 (three) times daily as needed for Dizziness. 60 tablet 3    methyl salicylate-menthol 15-10% 15-10 % Crea Apply topically 3 (three) times daily. (Patient taking differently: Apply topically 3 (three) times daily as needed.) 85 g 1    oxyCODONE (ROXICODONE) 15 MG Tab Take 1 tablet (15 mg total) by mouth every 6 (six) hours as needed for Pain. 120 tablet 0    potassium chloride SA (K-DUR,KLOR-CON) 20 MEQ tablet Take 1 tablet (20 mEq total) by mouth once daily. 30 tablet 0       Physical Exam    Reviewed nursing notes.  Vitals:    01/03/24 1056 01/03/24 1204 01/03/24 1232   BP: (!) 100/48 (!) 116/57 (!) 99/50   Patient Position: Sitting     Pulse: 68 70 68   Resp: 16 19 17   Temp: 99.2 °F (37.3 °C)     SpO2: 99% 96% 98%   Weight: 72.1 kg (159 lb)     Height: 5' 6" (1.676 m)   "     General:  Alert, no acute distress.  Appears chronically ill and debilitated.  Skin:  Warm, dry, intact.  No rash.  Head:  Normocephalic, atraumatic.    Neck:  Supple.   HEENT:  Pupils are equal and round, appropriate for room, extraocular movements are intact.  Mumbles. Has dry crackling lips and mucous membranes.  Cardiovascular:  Regular rate and rhythm, Normal peripheral perfusion, No edema.    Respiratory:  Lungs are diminished at bases, respirations are non-labored, breath sounds are equal.    Gastrointestinal:  Soft, Nontender, Non distended.   Back:  Nontender. No evidence of bruising or skin changes.  Musculoskeletal:  Normal range of motion observed though slow and limited.  Neurological:  Alert and oriented to person, but does not answer place/time/situation.  She follows commands.  She leans to the R side but she is so very weak and cannot move back to midline by herself.  is grossly equal.  Psychiatric:  Cooperative, appropriate mood & affect.       Initial MDM and Data Review    73 y.o. female presenting for evaluation of refusing meds/food for two days without fever but with signifcant history of debility and VRE/ESBL/MDR klebsiella UTIs.     Differential includes but is not limited to: UTI, pyelo, infected stone, sepsis, FLORECITA, less likely ICH/intracranial anomaly. Considered electrolyte derangements. Pna. Viral syndrome. NSTEMI.    Work-up includes: straight cath UA. Bcx. Viral studies. CT AP and CXR. CBC, CMP, troponin, LA//    Interventions include: IVF and abx    The patient has significant medical comorbidities that influence decision making for this acute process, such as: reviewed HTN HLD FLORECITA CHF (EG 70%) CKD prior NSTEMI    I decided to obtain the patient's medical records and review relevant documentation from hospital records and nursing home.  Pertinent information is noted.    This patient does have evidence of infective focus  My overall impression is SIRS Criteria x 1 with source  likely urine  Source: Urinary Tract  Antibiotics given-   Antibiotics (72h ago, onward)      Start     Stop Route Frequency Ordered    01/03/24 1500  ertapenem (INVANZ) 1 g in sodium chloride 0.9 % 100 mL IVPB (MB+)         01/04/24 0259 IV ED 1 Time 01/03/24 1449    01/03/24 1500  linezolid 600 mg/300 mL IVPB 600 mg         01/04/24 0259 IV ED 1 Time 01/03/24 1449          Latest lactate reviewed-  Recent Labs   Lab 01/03/24  1308   LACTATE 0.8     Organ dysfunction indicated by  NONE    Fluid challenge Not needed - patient is not hypotensive      Post- resuscitation assessment Yes Perfusion exam was performed within 6 hours of septic shock presentation after bolus shows Adequate tissue perfusion assessed by non-invasive monitoring       Will Not start Pressors- Levophed for MAP of 65  Source control achieved by: abx        ED US Guided Misc Procedure    Date/Time: 1/3/2024 1:05 PM    Performed by: Edwin Tucker DO  Authorized by: Edwin Tucker DO    Procedure:  Ultrasound-guided peripheral venous cannulation  Indication:  Failed or difficult IV access   Right   Antecubital  Procedure:  Dynamic ultrasound guidance used, Candidate vein examined with linear probe - confirmed collapsibility, lack of pulsatility, and proper anatomic location. and Using aseptic technique, IV catheter inserted with flash of blood noted, flow of venous blood confirmed.  Catheter gauge:  20   Flushes easily and without pain. Patient tolerated procedure well.  Complications:  None  Charge?:  Yes  ED US Guided Misc Procedure    Date/Time: 1/3/2024 2:30 PM    Performed by: Edwin Tucker DO  Authorized by: Edwin Tucker DO    Procedure:  Ultrasound-guided peripheral venous cannulation  Indication:  Failed or difficult IV access   Right   Forearm  Procedure:  Dynamic ultrasound guidance used, Candidate vein examined with linear probe - confirmed collapsibility, lack of pulsatility, and proper anatomic location. and Using aseptic  technique, IV catheter inserted with flash of blood noted, flow of venous blood confirmed.  Catheter gauge:  20   Flushes easily and without pain. Patient tolerated procedure well.  Complications:  None  Charge?:  Yes        Medications   lactated ringers bolus 1,000 mL (1,000 mLs Intravenous New Bag 1/3/24 1306)   ertapenem (INVANZ) 1 g in sodium chloride 0.9 % 100 mL IVPB (MB+) (has no administration in time range)   linezolid 600 mg/300 mL IVPB 600 mg (has no administration in time range)       Results and ED Course    Labs Reviewed   CBC W/ AUTO DIFFERENTIAL - Abnormal; Notable for the following components:       Result Value    WBC 14.54 (*)     RBC 3.94 (*)     Hemoglobin 9.5 (*)     Hematocrit 32.4 (*)     MCH 24.1 (*)     MCHC 29.3 (*)     RDW 20.7 (*)     Immature Granulocytes 0.9 (*)     Gran # (ANC) 11.6 (*)     Immature Grans (Abs) 0.13 (*)     Mono # 1.5 (*)     Gran % 79.5 (*)     Lymph % 8.8 (*)     All other components within normal limits   TROPONIN I - Abnormal; Notable for the following components:    Troponin I 0.157 (*)     All other components within normal limits    Narrative:     AD ON CPK PER JD FERRERA RN PER SILVINO CHAVEZ DO ORDER#                   [id number] @  01/03/2024  12:33    URINALYSIS, REFLEX TO URINE CULTURE - Abnormal; Notable for the following components:    Protein, UA 1+ (*)     Occult Blood UA 1+ (*)     Leukocytes, UA 3+ (*)     All other components within normal limits    Narrative:     Specimen Source->Urine   URINALYSIS MICROSCOPIC - Abnormal; Notable for the following components:    WBC, UA >100 (*)     WBC Clumps, UA Occasional (*)     Bacteria Many (*)     Hyaline Casts, UA 9 (*)     All other components within normal limits    Narrative:     Specimen Source->Urine   POCT GLUCOSE - Abnormal; Notable for the following components:    POCT Glucose 117 (*)     All other components within normal limits   ISTAT PROCEDURE - Abnormal; Notable for the following  components:    POC PO2 66 (*)     POC HCO3 22.7 (*)     POC BE -3 (*)     All other components within normal limits   ISTAT PROCEDURE - Abnormal; Notable for the following components:    POC BUN 33 (*)     POC Hematocrit 27 (*)     All other components within normal limits   ISTAT PROCEDURE - Abnormal; Notable for the following components:    POC Glucose 114 (*)     POC BUN 53 (*)     POC Creatinine 1.5 (*)     POC Sodium 131 (*)     POC Potassium >9.0 (*)     POC Chloride 111 (*)     POC TCO2 (MEASURED) 22 (*)     POC Ionized Calcium 0.96 (*)     POC Hematocrit 34 (*)     All other components within normal limits   ISTAT PROCEDURE - Abnormal; Notable for the following components:    POC Glucose 116 (*)     POC BUN 52 (*)     POC Creatinine 1.5 (*)     POC Sodium 132 (*)     POC Potassium >9.0 (*)     POC Ionized Calcium 0.94 (*)     POC Hematocrit 32 (*)     All other components within normal limits   CULTURE, BLOOD   CULTURE, BLOOD   CULTURE, URINE   TSH    Narrative:     AD ON CPK PER JD FERRERA RN PER SILVINO CHAVEZ DO ORDER#                   [id number] @  01/03/2024  12:33    B-TYPE NATRIURETIC PEPTIDE    Narrative:     AD ON CPK PER JD FERRERA RN PER SILVINO CHAVEZ DO ORDER#                   [id number] @  01/03/2024  12:33    TSH    Narrative:     AD ON CPK PER JD FERRERA RN PER SILVINO CHAVEZ DO ORDER#                   [id number] @  01/03/2024  12:33    CK   LACTIC ACID, PLASMA   SARS-COV2 (COVID) WITH FLU/RSV BY PCR   LACTIC ACID, PLASMA   COMPREHENSIVE METABOLIC PANEL   CK   LIPASE   MAGNESIUM   POCT GLUCOSE MONITORING CONTINUOUS   ISTAT CHEM8       Imaging Results              X-Ray Chest AP Portable (Final result)  Result time 01/03/24 12:18:04      Final result by Sumanth Thomas DO (01/03/24 12:18:04)                   Impression:      Please see above      Electronically signed by: Sumanth Thomas DO  Date:    01/03/2024  Time:    12:18               Narrative:     EXAMINATION:  XR CHEST AP PORTABLE    CLINICAL HISTORY:  Adult failure to thrive    TECHNIQUE:  Single frontal view of the chest was performed.    COMPARISON:  11/06/2023    FINDINGS:  Small lung volumes likely to poor inspiratory effort.  Study somewhat limited by lung volumes and rightward rotation.  No definite new lung opacity.  Continued slight nonspecific elevation right lung base.  No large pleural effusion or pneumothorax.  Continued atherosclerotic aorta.  Cardiomediastinal silhouette limited by technique and positioning.  Further evaluation as warranted clinically.                                      ED Course as of 01/03/24 1455   Wed Jan 03, 2024   1213 POCT Glucose(!): 117 [AC]   1236 POC PH: 7.366 [AC]   1237 POC PCO2: 39.7 [AC]   1237 WBC(!): 14.54 [AC]   1237 Hemoglobin(!): 9.5 [AC]   1237 Platelet Count: 317 [AC]   1237 BP(!): 99/50  Downtrending, high concern for sepsis.  Giving fluids, will continue to monitor [AC]   1334 Troponin I(!): 0.157  Elevated for unclear etiology [AC]   1419 Notified that patient's IV infiltrated.  I placed an additional 20 gauge in the right forearm [AC]   1438 WBC, UA(!): >100 [AC]   1438 WBC Clumps, UA(!): Occasional [AC]   1438 Bacteria, UA(!): Many [AC]      ED Course User Index  [AC] Edwin Tucker,            EKG interpreted by myself    EKG  Performed: 01/03/2024   Rate/Rhythm/Axis: 69 bpm, sinus rhythm, nml axis  QRS 78 ms  Qtc 402 ms  Impression:  Normal Sinus Rhythm.  EKG without evidence of Na channel blockade, K channel blockade, there is no prolonged QTc or digoxin effect.  There is no STEMI or signs of ischemia.      Relevant imaging interpreted by myself  CXR no consolidation    Impression and Plan    73 y.o. female with findings of Complex UTI and debility based on the work up in the emergency department as above.    Important lab/imaging findings include: reviewed as above  Consulted with Pharmacy re: appropriate antibiotics as per review of her  last Ucx but turns out a more recent Ucx from [facility] showed sensitivities to linezolid and ertapenem thus pharmacy recommended one dose of each.    All tests, treatment options and disposition options were discussed with the patient.  The decision was made to admit the patient to the hospital.    The patient was signed out to oncoming attending in stable condition and all further questions/concerns by patient and/or family were addressed.    Pending CT scans and admission.    Critical Care:  Date: 01/03/2024  Performed by: Dr. Edwin Tucker  Authorized by: Dr. Edwin Tucker   Total critical care time (exclusive of procedural time) : 35 minutes  Upon my evaluation, this patient had a high probability of imminent or life-threatening deterioration due to [ concern for sepsis UTI ] which required my direct attention, intervention and personal management.  Critical care was necessary to treat or prevent imminent or life-threatening deterioration.  This may include review of laboratory data, radiology results, discussion with consultants and family, and monitoring for potential decompensations. Interventions were performed as documented.                 Final diagnoses:  [R62.7] Failure to thrive in adult     UTI , complicated  elevated troponin           Edwin Tucker, DO  01/03/24 1700

## 2024-01-04 LAB
ANION GAP SERPL CALC-SCNC: 9 MMOL/L (ref 8–16)
BASOPHILS # BLD AUTO: 0.01 K/UL (ref 0–0.2)
BASOPHILS NFR BLD: 0.1 % (ref 0–1.9)
BUN SERPL-MCNC: 27 MG/DL (ref 8–23)
CALCIUM SERPL-MCNC: 8.7 MG/DL (ref 8.7–10.5)
CHLORIDE SERPL-SCNC: 110 MMOL/L (ref 95–110)
CO2 SERPL-SCNC: 20 MMOL/L (ref 23–29)
CREAT SERPL-MCNC: 0.8 MG/DL (ref 0.5–1.4)
DIFFERENTIAL METHOD BLD: ABNORMAL
EOSINOPHIL # BLD AUTO: 0 K/UL (ref 0–0.5)
EOSINOPHIL NFR BLD: 0 % (ref 0–8)
ERYTHROCYTE [DISTWIDTH] IN BLOOD BY AUTOMATED COUNT: 19.9 % (ref 11.5–14.5)
EST. GFR  (NO RACE VARIABLE): >60 ML/MIN/1.73 M^2
GLUCOSE SERPL-MCNC: 127 MG/DL (ref 70–110)
HCT VFR BLD AUTO: 28 % (ref 37–48.5)
HCV AB SERPL QL IA: NORMAL
HGB BLD-MCNC: 8.4 G/DL (ref 12–16)
HIV 1+2 AB+HIV1 P24 AG SERPL QL IA: NORMAL
IMM GRANULOCYTES # BLD AUTO: 0.1 K/UL (ref 0–0.04)
IMM GRANULOCYTES NFR BLD AUTO: 0.9 % (ref 0–0.5)
LYMPHOCYTES # BLD AUTO: 0.6 K/UL (ref 1–4.8)
LYMPHOCYTES NFR BLD: 5.3 % (ref 18–48)
MCH RBC QN AUTO: 24.1 PG (ref 27–31)
MCHC RBC AUTO-ENTMCNC: 30 G/DL (ref 32–36)
MCV RBC AUTO: 81 FL (ref 82–98)
MONOCYTES # BLD AUTO: 0.2 K/UL (ref 0.3–1)
MONOCYTES NFR BLD: 1.8 % (ref 4–15)
NEUTROPHILS # BLD AUTO: 10.8 K/UL (ref 1.8–7.7)
NEUTROPHILS NFR BLD: 91.9 % (ref 38–73)
NRBC BLD-RTO: 0 /100 WBC
PLATELET # BLD AUTO: 232 K/UL (ref 150–450)
PMV BLD AUTO: 11 FL (ref 9.2–12.9)
POTASSIUM SERPL-SCNC: 4.9 MMOL/L (ref 3.5–5.1)
RBC # BLD AUTO: 3.48 M/UL (ref 4–5.4)
SODIUM SERPL-SCNC: 139 MMOL/L (ref 136–145)
WBC # BLD AUTO: 11.7 K/UL (ref 3.9–12.7)

## 2024-01-04 PROCEDURE — 96361 HYDRATE IV INFUSION ADD-ON: CPT

## 2024-01-04 PROCEDURE — 87389 HIV-1 AG W/HIV-1&-2 AB AG IA: CPT | Mod: HCNC | Performed by: STUDENT IN AN ORGANIZED HEALTH CARE EDUCATION/TRAINING PROGRAM

## 2024-01-04 PROCEDURE — 96366 THER/PROPH/DIAG IV INF ADDON: CPT

## 2024-01-04 PROCEDURE — 25000003 PHARM REV CODE 250: Mod: HCNC | Performed by: STUDENT IN AN ORGANIZED HEALTH CARE EDUCATION/TRAINING PROGRAM

## 2024-01-04 PROCEDURE — 36415 COLL VENOUS BLD VENIPUNCTURE: CPT | Mod: HCNC,XB | Performed by: STUDENT IN AN ORGANIZED HEALTH CARE EDUCATION/TRAINING PROGRAM

## 2024-01-04 PROCEDURE — 36415 COLL VENOUS BLD VENIPUNCTURE: CPT | Mod: HCNC | Performed by: STUDENT IN AN ORGANIZED HEALTH CARE EDUCATION/TRAINING PROGRAM

## 2024-01-04 PROCEDURE — 63600175 PHARM REV CODE 636 W HCPCS: Mod: HCNC | Performed by: STUDENT IN AN ORGANIZED HEALTH CARE EDUCATION/TRAINING PROGRAM

## 2024-01-04 PROCEDURE — 96372 THER/PROPH/DIAG INJ SC/IM: CPT | Performed by: STUDENT IN AN ORGANIZED HEALTH CARE EDUCATION/TRAINING PROGRAM

## 2024-01-04 PROCEDURE — 80048 BASIC METABOLIC PNL TOTAL CA: CPT | Mod: HCNC | Performed by: STUDENT IN AN ORGANIZED HEALTH CARE EDUCATION/TRAINING PROGRAM

## 2024-01-04 PROCEDURE — 86803 HEPATITIS C AB TEST: CPT | Mod: HCNC | Performed by: STUDENT IN AN ORGANIZED HEALTH CARE EDUCATION/TRAINING PROGRAM

## 2024-01-04 PROCEDURE — G0378 HOSPITAL OBSERVATION PER HR: HCPCS | Mod: HCNC

## 2024-01-04 PROCEDURE — 85025 COMPLETE CBC W/AUTO DIFF WBC: CPT | Mod: HCNC | Performed by: STUDENT IN AN ORGANIZED HEALTH CARE EDUCATION/TRAINING PROGRAM

## 2024-01-04 RX ADMIN — SODIUM CHLORIDE, POTASSIUM CHLORIDE, SODIUM LACTATE AND CALCIUM CHLORIDE: 600; 310; 30; 20 INJECTION, SOLUTION INTRAVENOUS at 12:01

## 2024-01-04 RX ADMIN — OXYCODONE HYDROCHLORIDE 15 MG: 10 TABLET ORAL at 08:01

## 2024-01-04 RX ADMIN — METOPROLOL TARTRATE 50 MG: 50 TABLET, FILM COATED ORAL at 12:01

## 2024-01-04 RX ADMIN — ERTAPENEM 1 G: 1 INJECTION INTRAMUSCULAR; INTRAVENOUS at 06:01

## 2024-01-04 RX ADMIN — ENOXAPARIN SODIUM 40 MG: 40 INJECTION SUBCUTANEOUS at 05:01

## 2024-01-04 RX ADMIN — POLYETHYLENE GLYCOL 3350 17 G: 17 POWDER, FOR SOLUTION ORAL at 09:01

## 2024-01-04 RX ADMIN — METOPROLOL TARTRATE 50 MG: 50 TABLET, FILM COATED ORAL at 09:01

## 2024-01-04 RX ADMIN — OXYCODONE HYDROCHLORIDE 15 MG: 10 TABLET ORAL at 12:01

## 2024-01-04 RX ADMIN — OXYCODONE HYDROCHLORIDE 15 MG: 10 TABLET ORAL at 09:01

## 2024-01-04 RX ADMIN — METOPROLOL TARTRATE 50 MG: 50 TABLET, FILM COATED ORAL at 08:01

## 2024-01-04 RX ADMIN — OXYCODONE HYDROCHLORIDE 15 MG: 10 TABLET ORAL at 03:01

## 2024-01-04 NOTE — ASSESSMENT & PLAN NOTE
Cr 1.1 on presentation, and baseline appears to be around 0.1. Suspect FLORECITA due to volume depletion from poor p.o. intake along with chronic ARB use.  We will continue IV fluid resuscitation while monitoring on subsequent labs.  Hold home losartan. Avoid nephrotoxic agents as able, and renally dose all meds as applicable.

## 2024-01-04 NOTE — ASSESSMENT & PLAN NOTE
No evidence of acute exacerbation, however more likely with volume depletion.  We will continue IV fluids while monitoring volume status.

## 2024-01-04 NOTE — SUBJECTIVE & OBJECTIVE
Past Medical History:   Diagnosis Date    Anemia of chronic disease     Also with iron deficiency    Bacteremia due to Klebsiella pneumoniae     Cataract     Chronic diastolic (congestive) heart failure     Colon cancer     Encounter for blood transfusion     History of ESBL E. 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)        Past Surgical History:   Procedure Laterality Date    ABDOMINAL SURGERY      CATARACT EXTRACTION Left 10/2017     SECTION      CHOLECYSTECTOMY      COLON SURGERY      cystoscope      CYSTOSCOPY W/ RETROGRADES Right 10/10/2019    Procedure: CYSTOSCOPY, WITH RETROGRADE PYELOGRAM;  Surgeon: Gen Isbell MD;  Location: [facility];  Service: Urology;  Laterality: Right;    ERCP N/A 2021    Procedure: ERCP (ENDOSCOPIC RETROGRADE CHOLANGIOPANCREATOGRAPHY);  Surgeon: Jayjay Rivera MD;  Location: [address]);  Service: Endoscopy;  Laterality: N/A;    ERCP N/A 3/4/2022    Procedure: ERCP (ENDOSCOPIC RETROGRADE CHOLANGIOPANCREATOGRAPHY);  Surgeon: Roby Virgen MD;  Location: [facility] ([address] [place] [address]);  Service: Endoscopy;  Laterality: N/A;    EYE SURGERY      HYSTERECTOMY  1996    LITHOTRIPSY      LIVER BIOPSY      carcinoid    URETEROSCOPY Right 10/10/2019    Procedure: URETEROSCOPY;  Surgeon: Gen Isbell MD;  Location: [facility] OR;  Service: Urology;  Laterality: Right;    UTERINE FIBROID SURGERY         Review of patient's allergies indicates:   Allergen Reactions    Contrast media Hives,  Itching and Swelling    Epinephrine Anaphylaxis     Can cause  a Carcinoid Crisis    Ibuprofen Hives, Itching and Swelling    Zofran [ondansetron hcl] Itching     And multiple other reactions    Iodinated contrast media     Morphine Other (See Comments)    Sulfa (sulfonamide antibiotics) Hives, Itching and Swelling       No current facility-administered medications on file prior to encounter.     Current Outpatient Medications on File Prior to Encounter   Medication Sig    acetaminophen (TYLENOL) 325 MG tablet Take 650 mg by mouth every 6 (six) hours.    amLODIPine (NORVASC) 10 MG tablet Take 10 mg by mouth once daily.    DAILY PROBIOTIC, S. BOULARDII, 250 mg capsule Take 250 mg by mouth once daily.    diphenoxylate-atropine 2.5-0.025 mg (LOMOTIL) 2.5-0.025 mg per tablet Take 1 tablet by mouth daily as needed for Diarrhea.    losartan (COZAAR) 100 MG tablet Take 100 mg by mouth once daily.    metoprolol tartrate (LOPRESSOR) 50 MG tablet Take 50 mg by mouth 2 (two) times daily.    multivitamin (THERAGRAN) per tablet Take 1 tablet by mouth once daily.    oxyCODONE (ROXICODONE) 15 MG Tab Take 1 tablet (15 mg total) by mouth every 6 (six) hours as needed for Pain.    polyethylene glycol (GLYCOLAX) 17 gram PwPk Take 17 g by mouth once daily.    potassium chloride SA (K-DUR,KLOR-CON) 20 MEQ tablet Take 1 tablet (20 mEq total) by mouth once daily.    senna (SENOKOT) 8.6 mg tablet Take 1 tablet by mouth once daily.    water Liqd 150 mL with MILK OF MAGNESIA 400 mg/5 mL Susp 400 mg, diphenhydrAMINE 12.5 mg/5 mL Elix 60 mg, nystatin 100,000 unit/mL Susp 500,000 Units Take 5 mLs by mouth 2 (two) times daily as needed (pain).    [DISCONTINUED] carvediloL (COREG) 6.25 MG tablet Take 1 tablet (6.25 mg total) by mouth 2 (two) times daily.    [DISCONTINUED] cholestyramine (QUESTRAN) 4 gram packet Take 4 g by mouth 2 (two) times daily as needed (Diarrhea).    [DISCONTINUED] hydrocortisone 1 % lotion Apply 1 Squirt topically 2 (two)  times daily.    [DISCONTINUED] irbesartan (AVAPRO) 300 MG tablet Take 300 mg by mouth once daily.    [DISCONTINUED] loperamide (IMODIUM) 2 mg capsule Take 1 capsule (2 mg total) by mouth 2 (two) times daily as needed for Diarrhea.    [DISCONTINUED] meclizine (ANTIVERT) 25 mg tablet Take 1 tablet (25 mg total) by mouth 3 (three) times daily as needed for Dizziness.    [DISCONTINUED] methyl salicylate-menthol 15-10% 15-10 % Crea Apply topically 3 (three) times daily. (Patient taking differently: Apply topically 3 (three) times daily as needed.)     Family History       Problem Relation (Age of Onset)    Alzheimer's disease Father    Cancer Mother    No Known Problems Son, Son, Son, Son    Stroke Sister          Tobacco Use    Smoking status: Never    Smokeless tobacco: Never   Substance and Sexual Activity    Alcohol use: No     Alcohol/week: 0.0 standard drinks of alcohol    Drug use: No    Sexual activity: Not Currently     Review of Systems   Unable to perform ROS: Mental status change     Objective:     Vital Signs (Most Recent):  Temp: 99.2 °F (37.3 °C) (01/03/24 1056)  Pulse: 93 (01/03/24 1956)  Resp: (!) 22 (01/03/24 1956)  BP: (!) 144/63 (01/03/24 2001)  SpO2: 99 % (01/03/24 1956) Vital Signs (24h Range):  Temp:  [99.2 °F (37.3 °C)] 99.2 °F (37.3 °C)  Pulse:  [68-94] 93  Resp:  [14-22] 22  SpO2:  [96 %-99 %] 99 %  BP: ()/(48-69) 144/63     Weight: 72.1 kg (159 lb)  Body mass index is 25.66 kg/m².     Physical Exam  Vitals and nursing note reviewed.   Constitutional:       General: She is not in acute distress.     Appearance: She is well-developed. She is ill-appearing (chronically). She is not diaphoretic.   HENT:      Head: Normocephalic and atraumatic.      Mouth/Throat:      Comments: Notably dry mucous membranes with crusting around teeth/lips  Eyes:      General: No scleral icterus.     Conjunctiva/sclera: Conjunctivae normal.   Neck:      Vascular: No JVD.   Cardiovascular:      Rate and Rhythm:  Normal rate and regular rhythm.   Pulmonary:      Effort: Pulmonary effort is normal. No respiratory distress.      Breath sounds: No wheezing or rales.   Abdominal:      Tenderness: There is abdominal tenderness (mild TTP in mid-lower abdomen without guarding).   Musculoskeletal:      Right lower leg: No edema.      Left lower leg: No edema.   Skin:     Coloration: Skin is not jaundiced or pale.   Neurological:      Mental Status: She is alert.      Motor: Weakness (diffuse) present. No abnormal muscle tone.      Comments: Oriented only to person, somewhat situation, not fully to place or time. Knows that we just celebrated the new year. Soft and slow speech.    Psychiatric:         Mood and Affect: Mood normal.         Behavior: Behavior normal.                Significant Labs: All pertinent labs within the past 24 hours have been reviewed.  CBC:   Recent Labs   Lab 01/03/24  1204 01/03/24  1214 01/03/24  1226 01/03/24  1315   WBC 14.54*  --   --   --    HGB 9.5*  --   --   --    HCT 32.4* 32* 34* 27*     --   --   --      CMP:   Recent Labs   Lab 01/03/24  1425      K 4.2   *   CO2 19*      BUN 31*   CREATININE 1.1   CALCIUM 8.3*   PROT 6.6   ALBUMIN 2.1*   BILITOT 0.5   ALKPHOS 142*   AST 24   ALT 5*   ANIONGAP 11       Significant Imaging: I have reviewed all pertinent imaging results/findings within the past 24 hours.

## 2024-01-04 NOTE — PLAN OF CARE
CM went to pt's room to do initial assessment; pt getting direct pt care.  CM to follow.    HALEY AlbertN, BS, RN, CCM

## 2024-01-04 NOTE — ASSESSMENT & PLAN NOTE
Severe constipation noted on abdominal CT, likely contributing to her abdominal pain and possible UTI.  This is secondary to debility and chronic opioid use.  We will start a bowel regimen and give an enema.

## 2024-01-04 NOTE — ASSESSMENT & PLAN NOTE
Cautiously continue home pain regimen in the setting of altered mental status and constipation.

## 2024-01-04 NOTE — PHARMACY MED REC
"Admission Medication History     The home medication history was taken by Aristeo Varela.    You may go to "Admission" then "Reconcile Home Medications" tabs to review and/or act upon these items.     The home medication list has been updated by the Pharmacy department.   Please read ALL comments highlighted in yellow.   Please address this information as you see fit.    Feel free to contact us if you have any questions or require assistance.      The medications listed below were removed from the home medication list. Please reorder if appropriate:  Patient reports no longer taking the following medication(s):  CARVEDILOL 6.25 MG TABLET  CHOLESTYRAMINE 4 GM PACKET  HYDROCORTISONE 1 % LOTION  IRBESARTAN 300 MG TABLET  LOPERAMIDE 2 MG CAPSULE  MECLIZINE 25 MG TABLET  METHYL SALICYLATE-MENTHOL 15-10 % CREAM      Medications listed below were obtained from: Nursing home  Current Outpatient Medications on File Prior to Encounter   Medication Sig    acetaminophen (TYLENOL) 325 MG tablet   Give 650 mg by mouth every 6 (six) hours.    amLODIPine (NORVASC) 10 MG tablet   Give 10 mg by mouth once daily.    DAILY PROBIOTIC, S. BOULARDII, 250 mg capsule   Give 250 mg by mouth once daily.    diphenoxylate-atropine 2.5-0.025 mg (LOMOTIL) 2.5-0.025 mg per tablet   Give 1 tablet by mouth daily as needed for Diarrhea.    losartan (COZAAR) 100 MG tablet   Give 100 mg by mouth once daily.    metoprolol tartrate (LOPRESSOR) 50 MG tablet   Give 50 mg by mouth 2 (two) times daily.    multivitamin (THERAGRAN) per tablet   Give 1 tablet by mouth once daily.    oxyCODONE (ROXICODONE) 15 MG Tab   Give 1 tablet (15 mg total) by mouth every 6 (six) hours as needed for Pain.    polyethylene glycol (GLYCOLAX) 17 gram PwPk   Give 17 g by mouth once daily.    potassium chloride SA (K-DUR,KLOR-CON) 20 MEQ tablet   Give 1 tablet (20 mEq total) by mouth once daily.    senna (SENOKOT) 8.6 mg tablet   Give 1 tablet by mouth once daily.    water Liqd " 150 mL with MILK OF MAGNESIA 400 mg/5 mL Susp 400 mg, diphenhydrAMINE 12.5 mg/5 mL Elix 60 mg, nystatin 100,000 unit/mL Susp 500,000 Units   Give 5 mLs by mouth 2 (two) times daily as needed (pain).         Aristeo Varela  EXT [id number]                  .

## 2024-01-04 NOTE — ASSESSMENT & PLAN NOTE
CT abdomen and pelvis shows compression deformity of the anterior superior aspect of L5, which is new since prior CT in 09/2023.  Patient reports chronic back pain, however is not fully oriented.  May consider inpatient neurosurgery consultation pending her course.

## 2024-01-04 NOTE — PLAN OF CARE
Problem: Infection Progression (Sepsis/Septic Shock)  Goal: Absence of Infection Signs and Symptoms  Outcome: Ongoing, Progressing     Problem: Infection Progression (Sepsis/Septic Shock)  Goal: Absence of Infection Signs and Symptoms  Outcome: Ongoing, Progressing     Problem: Nutrition Impaired (Sepsis/Septic Shock)  Goal: Optimal Nutrition Intake  Outcome: Ongoing, Progressing     Problem: Nutrition Impaired (Sepsis/Septic Shock)  Goal: Optimal Nutrition Intake  Outcome: Ongoing, Progressing     Problem: Fluid and Electrolyte Imbalance (Acute Kidney Injury/Impairment)  Goal: Fluid and Electrolyte Balance  Outcome: Ongoing, Progressing     Problem: Fluid and Electrolyte Imbalance (Acute Kidney Injury/Impairment)  Goal: Fluid and Electrolyte Balance  Outcome: Ongoing, Progressing

## 2024-01-04 NOTE — ASSESSMENT & PLAN NOTE
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 Around 20-lb weight loss noted since last summer per charted weights. May benefit from dietary supplements.

## 2024-01-04 NOTE — ASSESSMENT & PLAN NOTE
Due to multiple comorbidities and history of CVA. Continue PT/OT upon discharge back to facility.

## 2024-01-04 NOTE — PLAN OF CARE
[facility] - Med Surg ([facility])  Initial Discharge Assessment       Primary Care Provider: Mariela Wei DO    Admission Diagnosis: Failure to thrive in adult [R62.7]  Chest pain [R07.9]    Admission Date: 1/3/2024  Expected Discharge Date: 1/5/2024    Transition of Care Barriers: (P) None    Payor: [employer] MANAGED MEDICARE / Plan: [employer] SNP HMO PPO SPECIAL NEEDS / Product Type: Medicare Advantage /     Extended Emergency Contact Information  Primary Emergency Contact: Matias Domingo  Mobile Phone: [phone]  Relation: Son  Secondary Emergency Contact: Dallas Domingo   [facility]  Mobile Phone: [phone]  Relation: Son    Discharge Plan A: (P) Return to nursing home  Discharge Plan B: (P) Home Health      [id number] - [place], [place] - [address] AT [facility] [address] & [facility] [address]  [address]  [facility] [address]  Phone: [phone] Fax: [phone]    [facility] - [place], [place] - [address]  [address]  [facility] [address]  Phone: [phone] Fax: [phone]    [facility]  [address]  [facility] [address]  Phone: [phone] Fax: [phone]    [id number] - [place] - [address] AT [facility] OF [facility]  [address]  [address]  [place] [address]  Phone: [phone] Fax: [phone]    [id number] - [place], [place] - [address] AT [facility] [address] [facility]  [address]  [facility] [address]  Phone: [phone] Fax: [phone]    [facility] - [place], [place] - [address] [facility] [address]  [facility] [address]  Phone: [phone] Fax: [phone]      Initial Assessment (most recent)       Adult Discharge Assessment - 01/04/24 1514          Discharge Assessment    Assessment Type Discharge Planning Assessment (P)      Confirmed/corrected address, phone number and insurance Yes (P)       Confirmed Demographics Correct on Facesheet (P)      Source of Information patient;family (P)      Does patient/caregiver understand observation status Yes (P)      Communicated JULIO C with patient/caregiver Other (see comments) (P)    Per nurse, pt may be d/c'd today, but no d/c orders in yet.    Reason For Admission FLORECITA (P)      People in Home alone (P)      Facility Arrived From: [facility] (P)      Do you expect to return to your current living situation? Yes (P)      Do you have help at home or someone to help you manage your care at home? No (P)      Prior to hospitilization cognitive status: Unable to Assess (P)      Current cognitive status: Alert/Oriented (P)      Walking or Climbing Stairs Difficulty yes (P)      Walking or Climbing Stairs ambulation difficulty, requires equipment (P)      Mobility Management walker, w/c (P)      Dressing/Bathing Difficulty yes (P)      Dressing/Bathing bathing difficulty, assistance 1 person (P)      Dressing/Bathing Management NH staff help (P)      Home Layout Able to live on 1st floor (P)      Equipment Currently Used at Home walker, rolling;wheelchair (P)      Readmission within 30 days? Yes (P)      Do you currently have service(s) that help you manage your care at home? No (P)      Do you take prescription medications? Yes (P)      Do you have prescription coverage? Yes (P)      Coverage [employer] (P)      Do you have any problems affording any of your prescribed medications? No (P)      Who is going to help you get home at discharge? w/c van back to [facility] (P)      How do you get to doctors appointments? family or friend will provide (P)      Are you on dialysis? No (P)      Do you take coumadin? No (P)      Discharge Plan A Return to nursing home (P)      Discharge Plan B Home Health (P)      DME Needed Upon Discharge  none (P)      Discharge Plan discussed with: Patient;Adult children (P)      Transition of Care Barriers None (P)         Physical Activity    On  average, how many days per week do you engage in moderate to strenuous exercise (like a brisk walk)? 0 days (P)      On average, how many minutes do you engage in exercise at this level? 0 min (P)         Financial Resource Strain    How hard is it for you to pay for the very basics like food, housing, medical care, and heating? Not hard at all (P)         Housing Stability    In the last 12 months, was there a time when you were not able to pay the mortgage or rent on time? No (P)      In the last 12 months, how many places have you lived? 1 (P)      In the last 12 months, was there a time when you did not have a steady place to sleep or slept in a shelter (including now)? No (P)         Transportation Needs    In the past 12 months, has lack of transportation kept you from medical appointments or from getting medications? No (P)      In the past 12 months, has lack of transportation kept you from meetings, work, or from getting things needed for daily living? No (P)         Food Insecurity    Within the past 12 months, you worried that your food would run out before you got the money to buy more. Never true (P)      Within the past 12 months, the food you bought just didn't last and you didn't have money to get more. Never true (P)         Stress    Do you feel stress - tense, restless, nervous, or anxious, or unable to sleep at night because your mind is troubled all the time - these days? Patient declined (P)         Social Connections    In a typical week, how many times do you talk on the phone with family, friends, or neighbors? More than three times a week (P)      How often do you get together with friends or relatives? More than three times a week (P)      How often do you attend Gnosticism or Voodoo services? Never (P)      Do you belong to any clubs or organizations such as Gnosticism groups, unions, fraternal or athletic groups, or school groups? No (P)      How often do you attend meetings of the clubs or  organizations you belong to? Never (P)      Are you , , , , never , or living with a partner?  (P)         Alcohol Use    Q1: How often do you have a drink containing alcohol? Never (P)      Q2: How many drinks containing alcohol do you have on a typical day when you are drinking? Patient does not drink (P)      Q3: How often do you have six or more drinks on one occasion? Never (P)                  CM spoke with pt and son, Dallas Domingo in room.  Pt came from [facility] for SNF.  Pt will need w/c van transport.  Family drives to MD appts.  No 30D readmission.  No HH, coumadin, or HD.  DME: walker, w/c.  Pt needs help with bathing, dressing right now - NH staff helps.  Observation status explained; CG v/u.  Ph[facility].      4:15 PM  Pt's son Dallas Domingo called, would like for pt to go to another facility for SNF.  Instructed that if no other facility accepts, pt will have to go back to [place] or home with HH.  CG v/u.  CM sent referrals to [facility], [facility], [facility].    CM called Janie with [facility] to inform that CG seeking alt placement.      HALEY AlbertN, BS, RN, CCM

## 2024-01-04 NOTE — ASSESSMENT & PLAN NOTE
Appears higher than recent baseline, however likely somewhat due to hemoconcentration. No indication for transfusion. Monitor.

## 2024-01-04 NOTE — ASSESSMENT & PLAN NOTE
Urinalysis shows greater than 100 white blood cells with no epithelial cells and many bacteria.  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 She does endorse lower abdominal pain and dysuria.  She is associated mild leukocytosis, however this may be secondary to hemoconcentration.  She was given ertapenem and linezolid in the ED; we will consult Infectious Disease to assist with any further antibiotics given her history of MDRO.

## 2024-01-04 NOTE — ED NOTES
Pt had small BM in bed, pt cleaned, new sheets and gown applied. Pt now resting comfortably in stretcher.

## 2024-01-04 NOTE — H&P
"[facility] - Emergency Dept  [facility] Medicine  History & Physical    Patient Name: Patito Domingo  MRN: [id number]  Patient Class: OP- Observation  Admission Date: 1/3/2024  Attending Physician: Gen Fernandez MD   Primary Care Provider: Mariela Wei DO         Patient information was obtained from patient, past medical records, and ER records.     Subjective:     Principal Problem:FLORECITA (acute kidney injury)    Chief Complaint:   Chief Complaint   Patient presents with    Failure To Thrive     Refusing meds, food, fluid. Transfer from [facility].         HPI: Patito Domingo is a 73 y.o. female with metastatic carcinoid tumor, history of CVA with debility, nephrolithiasis with history of ESBL/VRE UTI, anemia, diastolic CHF, chronic pain who presents today from [facility] for refusal to eat or drink.     Patient states that she "is a little confused" but endorses lower-mid abdominal pain of a sharp nature that has been present for about a day or so. She also endorses dysuria. Denies n/v, fever, or diarrhea, however chart review shows that she has chronic diarrhea. She can only tell me that she takes Oxycodone, which helps with the pain. She endorses chronic back pain which has not changed recently.     Per the ED on discussion with her nursing home, she was sent her for refusal to eat or drink for the past couple of days, which is new for her.     Past Medical History:   Diagnosis Date    Anemia of chronic disease     Also with iron deficiency    Bacteremia due to Klebsiella pneumoniae     Cataract     Chronic diastolic (congestive) heart failure     Colon cancer     Encounter for blood transfusion     History of ESBL E. 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)        Past Surgical History:   Procedure Laterality Date    ABDOMINAL SURGERY      CATARACT EXTRACTION Left 10/2017     SECTION      CHOLECYSTECTOMY      COLON SURGERY      cystoscope      CYSTOSCOPY W/ RETROGRADES Right 10/10/2019    Procedure: CYSTOSCOPY, WITH RETROGRADE PYELOGRAM;  Surgeon: Gen Isbell MD;  Location: [facility] OR;  Service: Urology;  Laterality: Right;    ERCP N/A 2021    Procedure: ERCP (ENDOSCOPIC RETROGRADE CHOLANGIOPANCREATOGRAPHY);  Surgeon: Jayjay Rivera MD;  Location: [facility] ENDO (2ND FLR);  Service: Endoscopy;  Laterality: N/A;    ERCP N/A 3/4/2022    Procedure: ERCP (ENDOSCOPIC RETROGRADE CHOLANGIOPANCREATOGRAPHY);  Surgeon: Roby Virgen MD;  [facility]  Service: Endoscopy;  Laterality: N/A;    EYE SURGERY      HYSTERECTOMY  1996    LITHOTRIPSY      LIVER BIOPSY      carcinoid    URETEROSCOPY Right 10/10/2019    Procedure: URETEROSCOPY;  Surgeon: Gen Isbell MD;  Location: [facility] OR;  Service: Urology;  Laterality: Right;    UTERINE FIBROID SURGERY         Review of patient's allergies indicates:   Allergen Reactions    Contrast media Hives, Itching and Swelling    Epinephrine Anaphylaxis     Can cause  a Carcinoid Crisis    Ibuprofen Hives, Itching and Swelling    Zofran [ondansetron hcl] Itching     And multiple other reactions    Iodinated contrast media     Morphine Other (See Comments)    Sulfa (sulfonamide antibiotics) Hives, Itching and Swelling       No current facility-administered medications on file prior to encounter.     Current Outpatient Medications on File Prior to Encounter   Medication Sig    acetaminophen (TYLENOL) 325 MG tablet Take 650 mg by mouth every 6 (six)  hours.    amLODIPine (NORVASC) 10 MG tablet Take 10 mg by mouth once daily.    DAILY PROBIOTIC, S. BOULARDII, 250 mg capsule Take 250 mg by mouth once daily.    diphenoxylate-atropine 2.5-0.025 mg (LOMOTIL) 2.5-0.025 mg per tablet Take 1 tablet by mouth daily as needed for Diarrhea.    losartan (COZAAR) 100 MG tablet Take 100 mg by mouth once daily.    metoprolol tartrate (LOPRESSOR) 50 MG tablet Take 50 mg by mouth 2 (two) times daily.    multivitamin (THERAGRAN) per tablet Take 1 tablet by mouth once daily.    oxyCODONE (ROXICODONE) 15 MG Tab Take 1 tablet (15 mg total) by mouth every 6 (six) hours as needed for Pain.    polyethylene glycol (GLYCOLAX) 17 gram PwPk Take 17 g by mouth once daily.    potassium chloride SA (K-DUR,KLOR-CON) 20 MEQ tablet Take 1 tablet (20 mEq total) by mouth once daily.    senna (SENOKOT) 8.6 mg tablet Take 1 tablet by mouth once daily.    water Liqd 150 mL with MILK OF MAGNESIA 400 mg/5 mL Susp 400 mg, diphenhydrAMINE 12.5 mg/5 mL Elix 60 mg, nystatin 100,000 unit/mL Susp 500,000 Units Take 5 mLs by mouth 2 (two) times daily as needed (pain).    [DISCONTINUED] carvediloL (COREG) 6.25 MG tablet Take 1 tablet (6.25 mg total) by mouth 2 (two) times daily.    [DISCONTINUED] cholestyramine (QUESTRAN) 4 gram packet Take 4 g by mouth 2 (two) times daily as needed (Diarrhea).    [DISCONTINUED] hydrocortisone 1 % lotion Apply 1 Squirt topically 2 (two) times daily.    [DISCONTINUED] irbesartan (AVAPRO) 300 MG tablet Take 300 mg by mouth once daily.    [DISCONTINUED] loperamide (IMODIUM) 2 mg capsule Take 1 capsule (2 mg total) by mouth 2 (two) times daily as needed for Diarrhea.    [DISCONTINUED] meclizine (ANTIVERT) 25 mg tablet Take 1 tablet (25 mg total) by mouth 3 (three) times daily as needed for Dizziness.    [DISCONTINUED] methyl salicylate-menthol 15-10% 15-10 % Crea Apply topically 3 (three) times daily. (Patient taking differently: Apply topically 3 (three) times daily as needed.)      Family History       Problem Relation (Age of Onset)    Alzheimer's disease Father    Cancer Mother    No Known Problems Son, Son, Son, Son    Stroke Sister          Tobacco Use    Smoking status: Never    Smokeless tobacco: Never   Substance and Sexual Activity    Alcohol use: No     Alcohol/week: 0.0 standard drinks of alcohol    Drug use: No    Sexual activity: Not Currently     Review of Systems   Unable to perform ROS: Mental status change     Objective:     Vital Signs (Most Recent):  Temp: 99.2 °F (37.3 °C) (01/03/24 1056)  Pulse: 93 (01/03/24 1956)  Resp: (!) 22 (01/03/24 1956)  BP: (!) 144/63 (01/03/24 2001)  SpO2: 99 % (01/03/24 1956) Vital Signs (24h Range):  Temp:  [99.2 °F (37.3 °C)] 99.2 °F (37.3 °C)  Pulse:  [68-94] 93  Resp:  [14-22] 22  SpO2:  [96 %-99 %] 99 %  BP: ()/(48-69) 144/63     Weight: 72.1 kg (159 lb)  Body mass index is 25.66 kg/m².     Physical Exam  Vitals and nursing note reviewed.   Constitutional:       General: She is not in acute distress.     Appearance: She is well-developed. She is ill-appearing (chronically). She is not diaphoretic.   HENT:      Head: Normocephalic and atraumatic.      Mouth/Throat:      Comments: Notably dry mucous membranes with crusting around teeth/lips  Eyes:      General: No scleral icterus.     Conjunctiva/sclera: Conjunctivae normal.   Neck:      Vascular: No JVD.   Cardiovascular:      Rate and Rhythm: Normal rate and regular rhythm.   Pulmonary:      Effort: Pulmonary effort is normal. No respiratory distress.      Breath sounds: No wheezing or rales.   Abdominal:      Tenderness: There is abdominal tenderness (mild TTP in mid-lower abdomen without guarding).   Musculoskeletal:      Right lower leg: No edema.      Left lower leg: No edema.   Skin:     Coloration: Skin is not jaundiced or pale.   Neurological:      Mental Status: She is alert.      Motor: Weakness (diffuse) present. No abnormal muscle tone.      Comments: Oriented only to  person, somewhat situation, not fully to place or time. Knows that we just celebrated the new year. Soft and slow speech.    Psychiatric:         Mood and Affect: Mood normal.         Behavior: Behavior normal.                Significant Labs: All pertinent labs within the past 24 hours have been reviewed.  CBC:   Recent Labs   Lab 01/03/24  1204 01/03/24  1214 01/03/24  1226 01/03/24  1315   WBC 14.54*  --   --   --    HGB 9.5*  --   --   --    HCT 32.4* 32* 34* 27*     --   --   --      CMP:   Recent Labs   Lab 01/03/24  1425      K 4.2   *   CO2 19*      BUN 31*   CREATININE 1.1   CALCIUM 8.3*   PROT 6.6   ALBUMIN 2.1*   BILITOT 0.5   ALKPHOS 142*   AST 24   ALT 5*   ANIONGAP 11       Significant Imaging: I have reviewed all pertinent imaging results/findings within the past 24 hours.  Assessment/Plan:     * FLORECITA (acute kidney injury)  Cr 1.1 on presentation, and baseline appears to be around 0.1. Suspect FLORECITA due to volume depletion from poor p.o. intake along with chronic ARB use.  We will continue IV fluid resuscitation while monitoring on subsequent labs.  Hold home losartan. Avoid nephrotoxic agents as able, and renally dose all meds as applicable.      Acute cystitis without hematuria  Urinalysis shows greater than 100 white blood cells with no epithelial cells and many bacteria.  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 She does endorse lower abdominal pain and dysuria.  She is associated mild leukocytosis, however this may be secondary to hemoconcentration.  She was given ertapenem and linezolid in the ED; we will consult Infectious Disease to assist with any further antibiotics given her history of MDRO.      Abnormal CT scan, lumbar spine  CT abdomen and pelvis shows compression deformity of the anterior superior aspect of L5, which is new since prior CT in 09/2023.  Patient reports chronic back pain, however  is not fully oriented.  May consider inpatient neurosurgery consultation pending her course.      Other constipation  Severe constipation noted on abdominal CT, likely contributing to her abdominal pain and possible UTI.  This is secondary to debility and chronic opioid use.  We will start a bowel regimen and give an enema.      Chronic diastolic (congestive) heart failure  No evidence of acute exacerbation, however more likely with volume depletion.  We will continue IV fluids while monitoring volume status.    Moderate protein-calorie malnutrition  Likely due to metastatic carcinoid with other chronic comorbidiites. Around 20-lb weight loss noted since last summer per charted weights. May benefit from dietary supplements.     Elevated troponin  Troponin elevated without EKG changes, and no complaints of chest pain.  Suspect this is due to renal insufficiency and volume depletion.  Continue home beta-blocker.      Anemia of chronic disease  Appears higher than recent baseline, however likely somewhat due to hemoconcentration. No indication for transfusion. Monitor.     Nephrolithiasis  Chronic, and noted on CT.  No acute issues.  Monitor.      Chronic pain syndrome  Cautiously continue home pain regimen in the setting of altered mental status and constipation.       Debility  Due to multiple comorbidities and history of CVA. Continue PT/OT upon discharge back to facility.     Metastatic carcinoid tumor  Known metastatic carcinoid with progressive mets on CT today. Likely contributing to her chronic debility. Continue outpatient follow up with Heme Onc.         VTE Risk Mitigation (From admission, onward)           Ordered     enoxaparin injection 40 mg  Every 24 hours         01/03/24 2007     IP VTE HIGH RISK PATIENT  Once         01/03/24 2007     Place sequential compression device  Until discontinued         01/03/24 2007                   Advance Care Planning   Patient is Full Code as patient is not fully  oriented, and no family available at bedside.  Patient's surrogate decision maker is her son.        On 01/03/2024, patient should be placed in hospital observation services under my care.             Chris Torres MD  Department of Hospital Medicine  [facility] - Emergency Dept

## 2024-01-04 NOTE — ED NOTES
Telemetry Verification   Patient placed on Telemetry Box  Verified with War Room  Box # [id number]   Monitor Tech Andrzej   Rate 85   Rhythm NSR

## 2024-01-04 NOTE — ASSESSMENT & PLAN NOTE
Known metastatic carcinoid with progressive mets on CT today. Likely contributing to her chronic debility. Continue outpatient follow up with Heme Onc.

## 2024-01-04 NOTE — ASSESSMENT & PLAN NOTE
Troponin elevated without EKG changes, and no complaints of chest pain.  Suspect this is due to renal insufficiency and volume depletion.  Continue home beta-blocker.

## 2024-01-04 NOTE — HPI
"Patito oDmingo is a 73 y.o. female with metastatic carcinoid tumor, history of CVA with debility, nephrolithiasis with history of ESBL/VRE UTI, anemia, diastolic CHF, chronic pain who presents today from [facility] for refusal to eat or drink.     Patient states that she "is a little confused" but endorses lower-mid abdominal pain of a sharp nature that has been present for about a day or so. She also endorses dysuria. Denies n/v, fever, or diarrhea, however chart review shows that she has chronic diarrhea. She can only tell me that she takes Oxycodone, which helps with the pain. She endorses chronic back pain which has not changed recently.     Per the ED on discussion with her nursing home, she was sent her for refusal to eat or drink for the past couple of days, which is new for her.   "

## 2024-01-05 LAB
ALBUMIN SERPL BCP-MCNC: 2 G/DL (ref 3.5–5.2)
ALP SERPL-CCNC: 157 U/L (ref 55–135)
ALT SERPL W/O P-5'-P-CCNC: <5 U/L (ref 10–44)
ANION GAP SERPL CALC-SCNC: 9 MMOL/L (ref 8–16)
AST SERPL-CCNC: 15 U/L (ref 10–40)
BACTERIA UR CULT: ABNORMAL
BILIRUB SERPL-MCNC: 0.2 MG/DL (ref 0.1–1)
BUN SERPL-MCNC: 36 MG/DL (ref 8–23)
CALCIUM SERPL-MCNC: 8.8 MG/DL (ref 8.7–10.5)
CHLORIDE SERPL-SCNC: 111 MMOL/L (ref 95–110)
CO2 SERPL-SCNC: 20 MMOL/L (ref 23–29)
CREAT SERPL-MCNC: 1.1 MG/DL (ref 0.5–1.4)
ERYTHROCYTE [DISTWIDTH] IN BLOOD BY AUTOMATED COUNT: 20 % (ref 11.5–14.5)
EST. GFR  (NO RACE VARIABLE): 53.7 ML/MIN/1.73 M^2
GLUCOSE SERPL-MCNC: 145 MG/DL (ref 70–110)
HCT VFR BLD AUTO: 28.6 % (ref 37–48.5)
HGB BLD-MCNC: 8.6 G/DL (ref 12–16)
MCH RBC QN AUTO: 24.2 PG (ref 27–31)
MCHC RBC AUTO-ENTMCNC: 30.1 G/DL (ref 32–36)
MCV RBC AUTO: 81 FL (ref 82–98)
PLATELET # BLD AUTO: 253 K/UL (ref 150–450)
PMV BLD AUTO: 11.3 FL (ref 9.2–12.9)
POTASSIUM SERPL-SCNC: 4.6 MMOL/L (ref 3.5–5.1)
PROT SERPL-MCNC: 6.6 G/DL (ref 6–8.4)
RBC # BLD AUTO: 3.55 M/UL (ref 4–5.4)
SODIUM SERPL-SCNC: 140 MMOL/L (ref 136–145)
WBC # BLD AUTO: 9.73 K/UL (ref 3.9–12.7)

## 2024-01-05 PROCEDURE — 85027 COMPLETE CBC AUTOMATED: CPT | Mod: HCNC | Performed by: STUDENT IN AN ORGANIZED HEALTH CARE EDUCATION/TRAINING PROGRAM

## 2024-01-05 PROCEDURE — 25000003 PHARM REV CODE 250: Mod: HCNC | Performed by: STUDENT IN AN ORGANIZED HEALTH CARE EDUCATION/TRAINING PROGRAM

## 2024-01-05 PROCEDURE — 63600175 PHARM REV CODE 636 W HCPCS: Mod: HCNC | Performed by: STUDENT IN AN ORGANIZED HEALTH CARE EDUCATION/TRAINING PROGRAM

## 2024-01-05 PROCEDURE — 96372 THER/PROPH/DIAG INJ SC/IM: CPT | Performed by: STUDENT IN AN ORGANIZED HEALTH CARE EDUCATION/TRAINING PROGRAM

## 2024-01-05 PROCEDURE — G0378 HOSPITAL OBSERVATION PER HR: HCPCS | Mod: HCNC

## 2024-01-05 PROCEDURE — 80053 COMPREHEN METABOLIC PANEL: CPT | Mod: HCNC | Performed by: STUDENT IN AN ORGANIZED HEALTH CARE EDUCATION/TRAINING PROGRAM

## 2024-01-05 PROCEDURE — 99215 OFFICE O/P EST HI 40 MIN: CPT | Mod: HCNC,,, | Performed by: STUDENT IN AN ORGANIZED HEALTH CARE EDUCATION/TRAINING PROGRAM

## 2024-01-05 PROCEDURE — 36415 COLL VENOUS BLD VENIPUNCTURE: CPT | Mod: HCNC | Performed by: STUDENT IN AN ORGANIZED HEALTH CARE EDUCATION/TRAINING PROGRAM

## 2024-01-05 RX ORDER — CIPROFLOXACIN 750 MG/1
750 TABLET, FILM COATED ORAL EVERY 12 HOURS
Status: DISCONTINUED | OUTPATIENT
Start: 2024-01-05 | End: 2024-01-11 | Stop reason: HOSPADM

## 2024-01-05 RX ADMIN — Medication 6 MG: at 10:01

## 2024-01-05 RX ADMIN — Medication 1 CAPSULE: at 09:01

## 2024-01-05 RX ADMIN — METOPROLOL TARTRATE 50 MG: 50 TABLET, FILM COATED ORAL at 09:01

## 2024-01-05 RX ADMIN — POLYETHYLENE GLYCOL 3350 17 G: 17 POWDER, FOR SOLUTION ORAL at 09:01

## 2024-01-05 RX ADMIN — OXYCODONE HYDROCHLORIDE 15 MG: 10 TABLET ORAL at 04:01

## 2024-01-05 RX ADMIN — ENOXAPARIN SODIUM 40 MG: 40 INJECTION SUBCUTANEOUS at 04:01

## 2024-01-05 RX ADMIN — CIPROFLOXACIN 750 MG: 750 TABLET, FILM COATED ORAL at 09:01

## 2024-01-05 RX ADMIN — OXYCODONE HYDROCHLORIDE 15 MG: 10 TABLET ORAL at 06:01

## 2024-01-05 RX ADMIN — OXYCODONE HYDROCHLORIDE 15 MG: 10 TABLET ORAL at 10:01

## 2024-01-05 NOTE — SUBJECTIVE & OBJECTIVE
Interval History: pleasant, denies any complaints      Objective:     Vital Signs (Most Recent):  Temp: 97.8 °F (36.6 °C) (01/04/24 2305)  Pulse: 64 (01/04/24 2321)  Resp: 20 (01/04/24 2305)  BP: 127/62 (01/04/24 2305)  SpO2: 99 % (01/04/24 2305) Vital Signs (24h Range):  Temp:  [97.8 °F (36.6 °C)-98.9 °F (37.2 °C)] 97.8 °F (36.6 °C)  Pulse:  [63-76] 64  Resp:  [16-20] 20  SpO2:  [95 %-100 %] 99 %  BP: (126-160)/(58-78) 127/62     Weight: 72.1 kg (159 lb)  Body mass index is 25.66 kg/m².    Intake/Output Summary (Last 24 hours) at 1/5/2024 0235  Last data filed at 1/4/2024 1702  Gross per 24 hour   Intake 1780.79 ml   Output 800 ml   Net 980.79 ml         Physical Exam  Vitals and nursing note reviewed.   Constitutional:       General: She is not in acute distress.     Appearance: She is well-developed. She is ill-appearing (chronically). She is not diaphoretic.   HENT:      Head: Normocephalic and atraumatic.      Mouth/Throat:      Comments: Notably dry mucous membranes with crusting around teeth/lips  Eyes:      General: No scleral icterus.     Conjunctiva/sclera: Conjunctivae normal.   Neck:      Vascular: No JVD.   Cardiovascular:      Rate and Rhythm: Normal rate and regular rhythm.   Pulmonary:      Effort: Pulmonary effort is normal. No respiratory distress.      Breath sounds: No wheezing or rales.   Abdominal:      Tenderness: There is no abdominal tenderness (mild TTP in mid-lower abdomen without guarding).   Musculoskeletal:      Right lower leg: No edema.      Left lower leg: No edema.   Skin:     Coloration: Skin is not jaundiced or pale.   Neurological:      Mental Status: She is alert.      Motor: Weakness (diffuse) present. No abnormal muscle tone.      Comments: Oriented only to person, somewhat situation, not fully to place or time. Knows that we just celebrated the new year. Soft and slow speech.    Psychiatric:         Mood and Affect: Mood normal.         Behavior: Behavior normal.              Significant Labs: All pertinent labs within the past 24 hours have been reviewed.    Significant Imaging: I have reviewed all pertinent imaging results/findings within the past 24 hours.

## 2024-01-05 NOTE — PLAN OF CARE
Problem: Infection  Goal: Absence of Infection Signs and Symptoms  1/5/2024 0640 by Reny Serrano, LPN  Outcome: Ongoing, Progressing  1/5/2024 0425 by Reny Serrano, LPN  Outcome: Ongoing, Progressing     Problem: Infection  Goal: Absence of Infection Signs and Symptoms  1/5/2024 0640 by Reny Serrano, LPN  Outcome: Ongoing, Progressing  1/5/2024 0425 by Reny Serrano, LPN  Outcome: Ongoing, Progressing     Problem: Adult Inpatient Plan of Care  Goal: Plan of Care Review  1/5/2024 0640 by Reny Serrano, LPN  Outcome: Ongoing, Progressing  1/5/2024 0425 by Reny Serrano, LPN  Outcome: Ongoing, Progressing  Goal: Patient-Specific Goal (Individualized)  1/5/2024 0640 by Reny Serrano, LPN  Outcome: Ongoing, Progressing  1/5/2024 0425 by Reny Serrano, LPN  Outcome: Ongoing, Progressing  Goal: Absence of Hospital-Acquired Illness or Injury  1/5/2024 0640 by Reny Serrano, LPN  Outcome: Ongoing, Progressing  1/5/2024 0425 by Reny Serrano, LPN  Outcome: Ongoing, Progressing  Goal: Optimal Comfort and Wellbeing  1/5/2024 0640 by Reny Serrano, LPN  Outcome: Ongoing, Progressing  1/5/2024 0425 by Reny Serrano, LPN  Outcome: Ongoing, Progressing  Goal: Readiness for Transition of Care  1/5/2024 0640 by Reny Serrano, LPN  Outcome: Ongoing, Progressing  1/5/2024 0425 by Reny Serrano, LPN  Outcome: Ongoing, Progressing     Problem: Adult Inpatient Plan of Care  Goal: Plan of Care Review  1/5/2024 0640 by Reny Serrano, LPN  Outcome: Ongoing, Progressing  1/5/2024 0425 by Reny Serrano, LPN  Outcome: Ongoing, Progressing     Problem: Adult Inpatient Plan of Care  Goal: Patient-Specific Goal (Individualized)  1/5/2024 0640 by Reny Serrano LPN  Outcome: Ongoing, Progressing  1/5/2024 0425 by Reny Serrano LPN  Outcome: Ongoing, Progressing     Problem: Adult Inpatient Plan of Care  Goal: Absence of  Hospital-Acquired Illness or Injury  1/5/2024 0640 by Reny Serrano, LPN  Outcome: Ongoing, Progressing  1/5/2024 0425 by Reny Serrano, LPN  Outcome: Ongoing, Progressing     Problem: Adult Inpatient Plan of Care  Goal: Optimal Comfort and Wellbeing  1/5/2024 0640 by Reny Serrano, LPN  Outcome: Ongoing, Progressing  1/5/2024 0425 by Reny Serrano LPN  Outcome: Ongoing, Progressing     Problem: Adult Inpatient Plan of Care  Goal: Readiness for Transition of Care  1/5/2024 0640 by Reny Serrano, LPN  Outcome: Ongoing, Progressing  1/5/2024 0425 by Reny Serrano LPN  Outcome: Ongoing, Progressing     Problem: Adjustment to Illness (Sepsis/Septic Shock)  Goal: Optimal Coping  1/5/2024 0640 by Reny Serrano LPN  Outcome: Ongoing, Progressing  1/5/2024 0425 by Reny Serrano LPN  Outcome: Ongoing, Progressing     Problem: Adjustment to Illness (Sepsis/Septic Shock)  Goal: Optimal Coping  1/5/2024 0640 by Reny Serrano, LPN  Outcome: Ongoing, Progressing  1/5/2024 0425 by Reny Serrano LPN  Outcome: Ongoing, Progressing   Pt able to express needs.  Low back pain managed with PRN Oxy.  Mouth care this morning.   Repositioned.  Safety maintained.  Bed in low position,  call  light in reach.

## 2024-01-05 NOTE — SUBJECTIVE & OBJECTIVE
Past Medical History:   Diagnosis Date    Anemia of chronic disease     Also with iron deficiency    Bacteremia due to Klebsiella pneumoniae     Cataract     Chronic diastolic (congestive) heart failure     Colon cancer     Encounter for blood transfusion     History of ESBL E. 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)        Past Surgical History:   Procedure Laterality Date    ABDOMINAL SURGERY      CATARACT EXTRACTION Left 10/2017     SECTION      CHOLECYSTECTOMY      COLON SURGERY      cystoscope      CYSTOSCOPY W/ RETROGRADES Right 10/10/2019    Procedure: CYSTOSCOPY, WITH RETROGRADE PYELOGRAM;  Surgeon: Gen Isbell MD;  Location: [facility];  Service: Urology;  Laterality: Right;    ERCP N/A 2021    Procedure: ERCP (ENDOSCOPIC RETROGRADE CHOLANGIOPANCREATOGRAPHY);  Surgeon: Jayjay Rivera MD;  Location: [address]);  Service: Endoscopy;  Laterality: N/A;    ERCP N/A 3/4/2022    Procedure: ERCP (ENDOSCOPIC RETROGRADE CHOLANGIOPANCREATOGRAPHY);  Surgeon: Roby Virgen MD;  Location: [facility] ([address] [place] [address]);  Service: Endoscopy;  Laterality: N/A;    EYE SURGERY      HYSTERECTOMY  1996    LITHOTRIPSY      LIVER BIOPSY      carcinoid    URETEROSCOPY Right 10/10/2019    Procedure: URETEROSCOPY;  Surgeon: Gen Isbell MD;  Location: [facility] OR;  Service: Urology;  Laterality: Right;    UTERINE FIBROID SURGERY         Review of patient's allergies indicates:   Allergen Reactions    Contrast media Hives,  Itching and Swelling    Epinephrine Anaphylaxis     Can cause  a Carcinoid Crisis    Ibuprofen Hives, Itching and Swelling    Zofran [ondansetron hcl] Itching     And multiple other reactions    Iodinated contrast media     Morphine Other (See Comments)    Sulfa (sulfonamide antibiotics) Hives, Itching and Swelling       Medications:  Medications Prior to Admission   Medication Sig    acetaminophen (TYLENOL) 325 MG tablet Take 650 mg by mouth every 6 (six) hours.    amLODIPine (NORVASC) 10 MG tablet Take 10 mg by mouth once daily.    DAILY PROBIOTIC, S. BOULARDII, 250 mg capsule Take 250 mg by mouth once daily.    diphenoxylate-atropine 2.5-0.025 mg (LOMOTIL) 2.5-0.025 mg per tablet Take 1 tablet by mouth daily as needed for Diarrhea.    losartan (COZAAR) 100 MG tablet Take 100 mg by mouth once daily.    metoprolol tartrate (LOPRESSOR) 50 MG tablet Take 50 mg by mouth 2 (two) times daily.    multivitamin (THERAGRAN) per tablet Take 1 tablet by mouth once daily.    oxyCODONE (ROXICODONE) 15 MG Tab Take 1 tablet (15 mg total) by mouth every 6 (six) hours as needed for Pain.    polyethylene glycol (GLYCOLAX) 17 gram PwPk Take 17 g by mouth once daily.    potassium chloride SA (K-DUR,KLOR-CON) 20 MEQ tablet Take 1 tablet (20 mEq total) by mouth once daily.    senna (SENOKOT) 8.6 mg tablet Take 1 tablet by mouth once daily.    water Liqd 150 mL with MILK OF MAGNESIA 400 mg/5 mL Susp 400 mg, diphenhydrAMINE 12.5 mg/5 mL Elix 60 mg, nystatin 100,000 unit/mL Susp 500,000 Units Take 5 mLs by mouth 2 (two) times daily as needed (pain).     Antibiotics (From admission, onward)      Start     Stop Route Frequency Ordered    01/04/24 1900  ertapenem (INVANZ) 1 g in sodium chloride 0.9 % 100 mL IVPB (MB+)         -- IV Every 24 hours (non-standard times) 01/04/24 1759          Antifungals (From admission, onward)      None          Antivirals (From admission, onward)      None             Immunization History   Administered  Date(s) Administered    COVID-19, MRNA, LN-S, PF (Pfizer) (Purple Cap) 07/25/2021, 08/14/2021    Influenza 11/06/2012    Influenza - High Dose - PF (65 years and older) 10/12/2017, 10/12/2018, 03/02/2020    Influenza - Trivalent (ADULT) 11/06/2012    PPD Test 07/08/2022    Pneumococcal Conjugate - 13 Valent 10/12/2017    Pneumococcal Polysaccharide - 23 Valent 03/02/2020       Family History       Problem Relation (Age of Onset)    Alzheimer's disease Father    Cancer Mother    No Known Problems Son, Son, Son, Son    Stroke Sister          Social History     Socioeconomic History    Marital status:    Occupational History    Occupation: disabled    Tobacco Use    Smoking status: Never    Smokeless tobacco: Never   Substance and Sexual Activity    Alcohol use: No     Alcohol/week: 0.0 standard drinks of alcohol    Drug use: No    Sexual activity: Not Currently   Social History Narrative    Pt lives with son     Social Determinants of Health     Financial Resource Strain: Low Risk  (1/4/2024)    Overall Financial Resource Strain (CARDIA)     Difficulty of Paying Living Expenses: Not hard at all   Food Insecurity: No Food Insecurity (1/4/2024)    Hunger Vital Sign     Worried About Running Out of Food in the Last Year: Never true     Ran Out of Food in the Last Year: Never true   Transportation Needs: No Transportation Needs (1/4/2024)    PRAPARE - Transportation     Lack of Transportation (Medical): No     Lack of Transportation (Non-Medical): No   Physical Activity: Inactive (1/4/2024)    Exercise Vital Sign     Days of Exercise per Week: 0 days     Minutes of Exercise per Session: 0 min   Stress: Patient Declined (1/4/2024)    [employer] - Occupational Stress Questionnaire     Feeling of Stress : Patient declined   Social Connections: Socially Isolated (1/4/2024)    Social Connection and Isolation Panel [NHANES]     Frequency of Communication with Friends and  Family: More than three times a week     Frequency of Social Gatherings with Friends and Family: More than three times a week     Attends Anabaptism Services: Never     Active Member of Clubs or Organizations: No     Attends Club or Organization Meetings: Never     Marital Status:    Housing Stability: Low Risk  (1/4/2024)    Housing Stability Vital Sign     Unable to Pay for Housing in the Last Year: No     Number of Places Lived in the Last Year: 1     Unstable Housing in the Last Year: No     Review of Systems   Constitutional:  Positive for appetite change and fatigue. Negative for chills, diaphoresis and fever.   HENT:  Negative for congestion, sinus pain and trouble swallowing.    Eyes:  Negative for pain.   Respiratory:  Negative for cough, choking and shortness of breath.    Cardiovascular:  Negative for chest pain and leg swelling.   Gastrointestinal:  Positive for abdominal pain and constipation. Negative for abdominal distention, blood in stool, diarrhea, nausea, rectal pain and vomiting.   Genitourinary:  Positive for dysuria, flank pain and vaginal pain. Negative for vaginal bleeding and vaginal discharge.   Musculoskeletal:  Positive for back pain. Negative for arthralgias, joint swelling, neck pain and neck stiffness.   Skin:  Negative for wound.   Neurological:  Positive for weakness. Negative for dizziness, seizures and syncope.   Psychiatric/Behavioral:  Positive for confusion. Negative for agitation.      Objective:     Vital Signs (Most Recent):  Temp: 97.4 °F (36.3 °C) (01/05/24 0945)  Pulse: 63 (01/05/24 0945)  Resp: 17 (01/05/24 0945)  BP: (!) 160/67 (01/05/24 0945)  SpO2: 98 % (01/05/24 0945) Vital Signs (24h Range):  Temp:  [97.4 °F (36.3 °C)-98.1 °F (36.7 °C)] 97.4 °F (36.3 °C)  Pulse:  [50-76] 63  Resp:  [16-20] 17  SpO2:  [95 %-100 %] 98 %  BP: (126-160)/(58-68) 160/67     Weight: 72.1 kg (159 lb)  Body mass index is 25.66 kg/m².    Estimated Creatinine Clearance: 47.7 mL/min  (based on SCr of 1.1 mg/dL).     Physical Exam  Vitals and nursing note reviewed.   Constitutional:       General: She is not in acute distress.     Appearance: She is ill-appearing (chronically). She is not toxic-appearing or diaphoretic.   HENT:      Head: Normocephalic and atraumatic.      Nose: No congestion.      Mouth/Throat:      Mouth: Mucous membranes are dry.   Eyes:      General: No scleral icterus.     Conjunctiva/sclera: Conjunctivae normal.   Cardiovascular:      Rate and Rhythm: Normal rate.      Heart sounds: Normal heart sounds. No murmur heard.  Pulmonary:      Effort: Pulmonary effort is normal. No respiratory distress.      Breath sounds: Normal breath sounds.   Abdominal:      General: Bowel sounds are normal. There is no distension.      Palpations: Abdomen is soft.      Tenderness: There is abdominal tenderness. There is right CVA tenderness and left CVA tenderness.      Comments: Generalized mild-mod TTP throughout.    Genitourinary:     Comments: Topical ointment cream noted at external genitalia / groin. Vaginal exam limited by body habitus and pain during light palpation on exam. No obvious rashes or lesions.  Musculoskeletal:         General: No swelling or tenderness.      Cervical back: No rigidity or tenderness.      Right lower leg: No edema.      Left lower leg: No edema.   Skin:     General: Skin is warm and dry.      Findings: No erythema or lesion.   Neurological:      General: No focal deficit present.      Mental Status: She is alert. Mental status is at baseline.   Psychiatric:         Mood and Affect: Mood normal.         Thought Content: Thought content normal.          Significant Labs: Blood Culture:   Recent Labs   Lab 09/26/23  1145 01/03/24  1205   LABBLOO No growth after 5 days.  No growth after 5 days. No Growth to date  No Growth to date  No Growth to date  No Growth to date     CBC:   Recent Labs   Lab 01/03/24  1204 01/03/24  1214 01/03/24  1315 01/04/24  0525  01/05/24  0858   WBC 14.54*  --   --  11.70 9.73   HGB 9.5*  --   --  8.4* 8.6*   HCT 32.4*   < > 27* 28.0* 28.6*     --   --  232 253    < > = values in this interval not displayed.     CMP:   Recent Labs   Lab 01/03/24  1425 01/04/24  0526 01/05/24  0858    139 140   K 4.2 4.9 4.6   * 110 111*   CO2 19* 20* 20*    127* 145*   BUN 31* 27* 36*   CREATININE 1.1 0.8 1.1   CALCIUM 8.3* 8.7 8.8   PROT 6.6  --  6.6   ALBUMIN 2.1*  --  2.0*   BILITOT 0.5  --  0.2   ALKPHOS 142*  --  157*   AST 24  --  15   ALT 5*  --  <5*   ANIONGAP 11 9 9     Microbiology Results (last 7 days)       Procedure Component Value Units Date/Time    Blood culture #1 **CANNOT BE ORDERED STAT** [[id number]] Collected: 01/03/24 1205    Order Status: Completed Specimen: Blood from Peripheral, Forearm, Right Updated: 01/04/24 1412     Blood Culture, Routine No Growth to date      No Growth to date    Blood culture #2 **CANNOT BE ORDERED STAT** [[id number]] Collected: 01/03/24 1205    Order Status: Completed Specimen: Blood from Peripheral, Forearm, Right Updated: 01/04/24 1412     Blood Culture, Routine No Growth to date      No Growth to date    Urine culture [[id number]]  (Abnormal) Collected: 01/03/24 1334    Order Status: Completed Specimen: Urine Updated: 01/04/24 0939     Urine Culture, Routine GRAM NEGATIVE JADEN  >100,000 cfu/ml  Identification and susceptibility pending      Narrative:      Specimen Source->Urine          Urine Culture:   Recent Labs   Lab 09/26/23  0347 01/03/24  1334   LABURIN No significant growth GRAM NEGATIVE JADEN  >100,000 cfu/ml  Identification and susceptibility pending  *     Urine Studies:   Recent Labs   Lab 09/26/23  0347 10/19/23  1711 11/21/23  1826 01/03/24  1334   COLORU Yellow   < > Yellow Yellow   APPEARANCEUA Clear   < >  --  Ex.Turbid   PHUR 7.0   < >  --  5.0   SPECGRAV 1.015   < >  --  1.020   PROTEINUA Trace*   < >  --  1+*   GLUCUA Negative   < > Negative Negative   KETONESU  Negative   < >  --  Negative   BILIRUBINUA Negative   < > Negative Negative   OCCULTUA Trace*   < > Trace* 1+*   NITRITE Negative   < >  --  Negative   UROBILINOGEN Negative   < > 0.2  --    LEUKOCYTESUR 1+*   < > 3+* 3+*   RBCUA 4   < >  --  4   WBCUA 65*   < >  --  >100*   BACTERIA Few*   < >  --  Many*   SQUAMEPITHEL 4  --   --   --    HYALINECASTS  --   --   --  9*    < > = values in this interval not displayed.     All pertinent labs within the past 24 hours have been reviewed.    Significant Imaging: I have reviewed all pertinent imaging results/findings within the past 24 hours.    I have personally reviewed records / hospital notes from   service and other specialty providers. I have also reviewed CBC, CMP/BMP,  cultures and imaging with my interpretation as documented in my assessment / plan.    Patient is high risk for infectious complications given pt's age, multiple co-morbidities, and case complexity.      Time: 75 minutes   50% of time spent on face-to-face counseling and coordination of care. Counseling included review of test results, diagnosis, and treatment plan with patient and/or family.

## 2024-01-05 NOTE — HPI
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), anemia, CHF, and  h/o kidney stones b/L with notable large non-obstructing staghorn calculus R kidney, c/b hydronephrosis, UTI with ESBL-Kleb (last episode 09/2023) and prior VRE 02/2023. Urology previously deferred surgery, patient previously refused surgery; failed suppressive minocycline and previously completed 6wks IV-Erta (ended ~03/2023) with outside ID, in attempt to sterilize calculi. -  The patient now presented (01/03) from [facility] for failure to thrive, refusing to eat or drink for past few days. Per chart review; some reports of pt having some confusion, and lower mid-abdominal pain (sharp, onset yesterday), and some dysuria.      Pt afebrile, VSS, HDS; leukocytosis (14) on arrival (01/03), albumin 2.1.   Urine clean catch; UA wbc >100, many bacteria, rbc 4.  Ucx: GNR.  Bld cxs NGTD.    Pt started on Iv-linezolid and Ertapenem, c/f return of urosepsis considering pt presentation and h/o kidney stones with MDRO. ID consulted for further eval and abx management. Pt has yet to f/u with palliative care outpt for ongoing discussions of GOC.     Ct-a/p:  showed kidney stones b/L with notable large non-obstructing staghorn calculus R kidney. Calcific density noted within the urinary bladder may reflect sequela of passed calculi.  Also, shows diffuse hepatic malignancy noting progressive enlargement of low attenuating and rim enhancing lesions throughout parenchyma.    Large amount of stool in the rectum concerning for constipation.   There is a enhancing partially calcified soft tissue focus within the anterior aspect of the right lower quadrant measuring 4.3 x 2.2 cm, stable. No focal organized pelvic fluid collection.   Interval compression deformity involving the anterior superior aspect of L5, correlation with any focal tenderness  recommended.   Tree-in-bud type nodules within the right lower lobe noting a few scattered 2-3 mm pulmonary nodules elsewhere; not noted on prior imaging, new? Developing right lower lobe infectious process not excluded.        I have reviewed hospital notes from   service and other specialty providers. I have also reviewed CBC, CMP/BMP,  cultures and imaging with my interpretation as documented.      73F with metastatic carcinoid tumor of midgut, ileum with progressive secondary mets to liver (last chemo ~01/2022), chronic pain (oxycodone PRN), h/o CVA w/ debility (resides at [facility]), anemia, CHF, and  h/o kidney stones b/L with notable large non-obstructing staghorn calculus R kidney, c/b hydronephrosis, UTI with ESBL-Kleb (last episode 09/2023) and prior VRE 02/2023. Urology previously deferred surgery, patient previously refused surgery; failed suppressive minocycline and previously completed 6wks IV-Erta (ended ~03/2023) with outside ID, in attempt to sterilize calculi. -  The patient now presented (01/03) from [facility] for failure to thrive, refusing to eat or drink for past few days. Per chart review; some reports of pt having some confusion, and lower mid-abdominal pain (sharp, onset yesterday), and some dysuria.      Ct-a/p: showed bilateral non-obstructive stones, noting stable large R renal staghorn calculus, non-obstructive. Renal calculi noted in bladder. No hydronephrosis. Imaging also noting diffuse progression of metastatic liver lesions, and possible new tree-in-bud type nodules within the right lower lobe noting a few scattered 2-3 mm pulmonary nodules elsewhere. Also, Large amount of stool in the rectum concerning for constipation. No intra-abd fluid collections or abscesses noted. See CT report for other detailed findings.     Recommendations / Plan:  Discontinue IV-Linezolid.   May continue Iv-Ertapenem for now -- pending Ucx GNR (ID/susc pending).  Anticipate abx course of 7-10d; if in line with GOC.  May consider extending course if plans for urologic procedure for stone removal / management.  Recommend palliative care consult to eval inpatient to discuss GOC.  Recommend urology consult.       -- ID will schedule pt for ID clinic f/u appt   -- Discussed with ID staff and primary team.  -- ID will sign off at this time. Please see OPAT note below for outpt abx plans.     Outpatient Antibiotic Therapy Plan:    Please send referral to [facility].    1) Infection :   cUTI w/ Renal stones    2) Discharge Antibiotics:     Intravenous antibiotics:  IV - Ertapenem     3) Therapy Duration:  14d    Estimated end date of IV antibiotics:  01/16/24    4) Outpatient Weekly Labs:    Order the following labs to be drawn on Mondays:   CBC  CMP   CRP      5) Fax Lab Results to Infectious Diseases Provider:  Alfa Johns PA-C    [facility] Fax Number: [phone]    6) Outpatient Infectious Diseases Follow-up    Follow-up appointment will be arranged by the ID clinic and will be found in the patient's appointments tab.    Prior to discharge, please ensure the patient's follow-up appt has been scheduled with ID-Clinic -- for patient convenience and continuity of care.  If there is still no follow-up scheduled prior to discharge, please send an EPIC message to  Katie Maciel LPN  in Infectious Diseases.

## 2024-01-05 NOTE — PROGRESS NOTES
Pharmacist Renal Dose Adjustment Note    Patito Domingo is a 73 y.o. female being treated with the medication Cipro for UTI.     Patient Data:    Vital Signs (Most Recent):  Temp: 98.4 °F (36.9 °C) (01/05/24 1615)  Pulse: 67 (01/05/24 1615)  Resp: 17 (01/05/24 1636)  BP: (!) 154/67 (01/05/24 1615)  SpO2: 95 % (01/05/24 1615) Vital Signs (72h Range):  Temp:  [97.4 °F (36.3 °C)-99.2 °F (37.3 °C)]   Pulse:  [50-94]   Resp:  [14-22]   BP: ()/(48-78)   SpO2:  [95 %-100 %]      Recent Labs   Lab 01/03/24  1425 01/04/24  0526 01/05/24  0858   CREATININE 1.1 0.8 1.1     Serum creatinine: 1.1 mg/dL 01/05/24 0858  Estimated creatinine clearance: 47.7 mL/min    Per protocol for CrCl > 30 ml/min, dose will be increased from 500 mg PO BID to 750 mg PO BID.     Pharmacist's Name: Katherine E Mcardle

## 2024-01-05 NOTE — PLAN OF CARE
Per CP [facility], [facility], and [facility] have accepted pt.  CM called dede Haynes [phone] and Matias [phone], informed of accepting facilities, instructed that decision needs to be made as soon as possible; we need to move forward with getting her to the next appropriate level of care. Pt is in Observation status, and that means she could be d/c'd as early as today.  CG v/u, agrees to call CM back.    CM submitted for SNF auth via [employer].    3:10 PM  CM spoke with Matias Domingo.  He states they've not chosen a new facility.  Instructed in the urgency of making a decision so she could have choices when she's d/c'd.    3:19 PM  Per [employer] ref# [id number] auth is still pending.    4:20 PM  CM called in locet, faxed PasRR to state, uploaded PasRR to CP.    HALEY AlbertN, BS, RN, CCM

## 2024-01-05 NOTE — PLAN OF CARE
Problem: Infection  Goal: Absence of Infection Signs and Symptoms  Outcome: Ongoing, Progressing     Problem: Infection  Goal: Absence of Infection Signs and Symptoms  Outcome: Ongoing, Progressing     Problem: Adult Inpatient Plan of Care  Goal: Plan of Care Review  Outcome: Ongoing, Progressing  Goal: Patient-Specific Goal (Individualized)  Outcome: Ongoing, Progressing  Goal: Absence of Hospital-Acquired Illness or Injury  Outcome: Ongoing, Progressing  Goal: Optimal Comfort and Wellbeing  Outcome: Ongoing, Progressing  Goal: Readiness for Transition of Care  Outcome: Ongoing, Progressing     Problem: Adult Inpatient Plan of Care  Goal: Plan of Care Review  Outcome: Ongoing, Progressing     Problem: Adult Inpatient Plan of Care  Goal: Patient-Specific Goal (Individualized)  Outcome: Ongoing, Progressing     Problem: Adult Inpatient Plan of Care  Goal: Absence of Hospital-Acquired Illness or Injury  Outcome: Ongoing, Progressing     Problem: Adult Inpatient Plan of Care  Goal: Optimal Comfort and Wellbeing  Outcome: Ongoing, Progressing     Problem: Adult Inpatient Plan of Care  Goal: Readiness for Transition of Care  Outcome: Ongoing, Progressing     Problem: Adjustment to Illness (Sepsis/Septic Shock)  Goal: Optimal Coping  Outcome: Ongoing, Progressing     Problem: Adjustment to Illness (Sepsis/Septic Shock)  Goal: Optimal Coping  Outcome: Ongoing, Progressing     Problem: Bleeding (Sepsis/Septic Shock)  Goal: Absence of Bleeding  Outcome: Ongoing, Progressing     Problem: Bleeding (Sepsis/Septic Shock)  Goal: Absence of Bleeding  Outcome: Ongoing, Progressing     Problem: Glycemic Control Impaired (Sepsis/Septic Shock)  Goal: Blood Glucose Level Within Desired Range  Outcome: Ongoing, Progressing     Problem: Glycemic Control Impaired (Sepsis/Septic Shock)  Goal: Blood Glucose Level Within Desired Range  Outcome: Ongoing, Progressing     Problem: Nutrition Impaired (Sepsis/Septic Shock)  Goal: Optimal  Nutrition Intake  Outcome: Ongoing, Progressing     Problem: Nutrition Impaired (Sepsis/Septic Shock)  Goal: Optimal Nutrition Intake  Outcome: Ongoing, Progressing     Problem: Fluid and Electrolyte Imbalance (Acute Kidney Injury/Impairment)  Goal: Fluid and Electrolyte Balance  Outcome: Ongoing, Progressing     Problem: Fluid and Electrolyte Imbalance (Acute Kidney Injury/Impairment)  Goal: Fluid and Electrolyte Balance  Outcome: Ongoing, Progressing     Problem: Oral Intake Inadequate (Acute Kidney Injury/Impairment)  Goal: Optimal Nutrition Intake  Outcome: Ongoing, Progressing     Problem: Oral Intake Inadequate (Acute Kidney Injury/Impairment)  Goal: Optimal Nutrition Intake  Outcome: Ongoing, Progressing     Problem: Skin Injury Risk Increased  Goal: Skin Health and Integrity  Outcome: Ongoing, Progressing     Problem: Impaired Wound Healing  Goal: Optimal Wound Healing  Outcome: Ongoing, Progressing   Pt  able to express needs.  Chronic back pain  relieved by PRN Oxy.  Contact Isolation maintained as ordered.   Safety maintained.  Bed in low position,  call  light in reach.

## 2024-01-05 NOTE — PROGRESS NOTES
"[facility] Surg ([address] Medicine  Progress Note    Patient Name: Patito Domingo  MRN: [id number]  Patient Class: OP- Observation   Admission Date: 1/3/2024  Length of Stay: 0 days  Attending Physician: Katherine Solis DO  Primary Care Provider: Mariela Wei DO        Subjective:     Principal Problem:FLORECITA (acute kidney injury)        HPI:  Patito Domingo is a 73 y.o. female with metastatic carcinoid tumor, history of CVA with debility, nephrolithiasis with history of ESBL/VRE UTI, anemia, diastolic CHF, chronic pain who presents today from [facility] for refusal to eat or drink.     Patient states that she "is a little confused" but endorses lower-mid abdominal pain of a sharp nature that has been present for about a day or so. She also endorses dysuria. Denies n/v, fever, or diarrhea, however chart review shows that she has chronic diarrhea. She can only tell me that she takes Oxycodone, which helps with the pain. She endorses chronic back pain which has not changed recently.     Per the ED on discussion with her nursing home, she was sent her for refusal to eat or drink for the past couple of days, which is new for her.     Overview/Hospital Course:  Patient presented with refusal to eat, UA suggestive of UTI. Ertapenem started. Discussed case with son. Patient eating better (75% breakfast, 25% lunch). Continue curretn management. FLORECITA resolved.     Interval History: pleasant, denies any complaints      Objective:     Vital Signs (Most Recent):  Temp: 97.8 °F (36.6 °C) (01/04/24 2305)  Pulse: 64 (01/04/24 2321)  Resp: 20 (01/04/24 2305)  BP: 127/62 (01/04/24 2305)  SpO2: 99 % (01/04/24 2305) Vital Signs (24h Range):  Temp:  [97.8 °F (36.6 °C)-98.9 °F (37.2 °C)] 97.8 °F (36.6 °C)  Pulse:  [63-76] 64  Resp:  [16-20] 20  SpO2:  [95 %-100 %] 99 %  BP: (126-160)/(58-78) 127/62     Weight: 72.1 kg (159 lb)  Body mass index is 25.66 kg/m².    Intake/Output Summary (Last 24 " hours) at 1/5/2024 0235  Last data filed at 1/4/2024 1702  Gross per 24 hour   Intake 1780.79 ml   Output 800 ml   Net 980.79 ml         Physical Exam  Vitals and nursing note reviewed.   Constitutional:       General: She is not in acute distress.     Appearance: She is well-developed. She is ill-appearing (chronically). She is not diaphoretic.   HENT:      Head: Normocephalic and atraumatic.      Mouth/Throat:      Comments: Notably dry mucous membranes with crusting around teeth/lips  Eyes:      General: No scleral icterus.     Conjunctiva/sclera: Conjunctivae normal.   Neck:      Vascular: No JVD.   Cardiovascular:      Rate and Rhythm: Normal rate and regular rhythm.   Pulmonary:      Effort: Pulmonary effort is normal. No respiratory distress.      Breath sounds: No wheezing or rales.   Abdominal:      Tenderness: There is no abdominal tenderness (mild TTP in mid-lower abdomen without guarding).   Musculoskeletal:      Right lower leg: No edema.      Left lower leg: No edema.   Skin:     Coloration: Skin is not jaundiced or pale.   Neurological:      Mental Status: She is alert.      Motor: Weakness (diffuse) present. No abnormal muscle tone.      Comments: Oriented only to person, somewhat situation, not fully to place or time. Knows that we just celebrated the new year. Soft and slow speech.    Psychiatric:         Mood and Affect: Mood normal.         Behavior: Behavior normal.             Significant Labs: All pertinent labs within the past 24 hours have been reviewed.    Significant Imaging: I have reviewed all pertinent imaging results/findings within the past 24 hours.    Assessment/Plan:      * FLORECITA (acute kidney injury)  Cr 1.1 on presentation, and baseline appears to be around 0.1. Suspect FLORECITA due to volume depletion from poor p.o. intake along with chronic ARB use.  We will continue IV fluid resuscitation while monitoring on subsequent labs.  Hold home losartan. Avoid nephrotoxic agents as able, and  renally dose all meds as applicable.      Acute cystitis without hematuria  Urinalysis shows greater than 100 white blood cells with no epithelial cells and many bacteria.  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 She does endorse lower abdominal pain and dysuria.  She is associated mild leukocytosis, however this may be secondary to hemoconcentration.  She was given ertapenem and linezolid in the ED; we will consult Infectious Disease to assist with any further antibiotics given her history of MDRO.      Abnormal CT scan, lumbar spine  CT abdomen and pelvis shows compression deformity of the anterior superior aspect of L5, which is new since prior CT in 09/2023.  Patient reports chronic back pain, however is not fully oriented.  May consider inpatient neurosurgery consultation pending her course.      Other constipation  Severe constipation noted on abdominal CT, likely contributing to her abdominal pain and possible UTI.  This is secondary to debility and chronic opioid use.  We will start a bowel regimen and give an enema.      Chronic diastolic (congestive) heart failure  No evidence of acute exacerbation, however more likely with volume depletion.  We will continue IV fluids while monitoring volume status.    Moderate protein-calorie malnutrition  Likely due to metastatic carcinoid with other chronic comorbidiites. Around 20-lb weight loss noted since last summer per charted weights. May benefit from dietary supplements.     Elevated troponin  Troponin elevated without EKG changes, and no complaints of chest pain.  Suspect this is due to renal insufficiency and volume depletion.  Continue home beta-blocker.      Anemia of chronic disease  Appears higher than recent baseline, however likely somewhat due to hemoconcentration. No indication for transfusion. Monitor.     Nephrolithiasis  Chronic, and noted on CT.  No acute issues.   Monitor.      Chronic pain syndrome  Cautiously continue home pain regimen in the setting of altered mental status and constipation.       Debility  Due to multiple comorbidities and history of CVA. Continue PT/OT upon discharge back to facility.     Metastatic carcinoid tumor  Known metastatic carcinoid with progressive mets on CT today. Likely contributing to her chronic debility. Continue outpatient follow up with Heme Onc.         VTE Risk Mitigation (From admission, onward)           Ordered     enoxaparin injection 40 mg  Every 24 hours         01/03/24 2007     IP VTE HIGH RISK PATIENT  Once         01/03/24 2007     Place sequential compression device  Until discontinued         01/03/24 2007                    Discharge Planning   JULIO C: 1/5/2024     Code Status: Full Code   Is the patient medically ready for discharge?:     Reason for patient still in hospital (select all that apply): Patient trending condition and Treatment  Discharge Plan A: Return to nursing home                  Katherine Solis DO  Department of Hospital Medicine   [facility] ([place]

## 2024-01-05 NOTE — CONSULTS
[facility] - [facility] ([place] [address])  Wound Care    Patient Name:  Patiot Domingo   MRN:  [id number]  Date: 1/5/2024  Diagnosis: FLORECITA (acute kidney injury)    History:     Past Medical History:   Diagnosis Date    Anemia of chronic disease     Also with iron deficiency    Bacteremia due to Klebsiella pneumoniae     Cataract     Chronic diastolic (congestive) heart failure     Colon cancer     Encounter for blood transfusion     History of ESBL E. 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)        Social History     Socioeconomic History    Marital status:    Occupational History    Occupation: disabled    Tobacco Use    Smoking status: Never    Smokeless tobacco: Never   Substance and Sexual Activity    Alcohol use: No     Alcohol/week: 0.0 standard drinks of alcohol    Drug use: No    Sexual activity: Not Currently   Social History Narrative    Pt lives with son     Social Determinants of Health     Financial Resource Strain: Low Risk  (1/4/2024)    Overall Financial Resource Strain (CARDIA)     Difficulty of Paying Living Expenses: Not hard at all   Food Insecurity: No Food Insecurity (1/4/2024)    Hunger Vital Sign     Worried About Running Out of Food in the Last Year: Never true     Ran Out of Food in the Last Year: Never true   Transportation Needs: No Transportation Needs (1/4/2024)    PRAPARE - Transportation     Lack of Transportation (Medical): No     Lack of Transportation (Non-Medical): No   Physical  Activity: Inactive (1/4/2024)    Exercise Vital Sign     Days of Exercise per Week: 0 days     Minutes of Exercise per Session: 0 min   Stress: Patient Declined (1/4/2024)    Citizen of [place] [employer] - Occupational Stress Questionnaire     Feeling of Stress : Patient declined   Social Connections: Socially Isolated (1/4/2024)    Social Connection and Isolation Panel [NHANES]     Frequency of Communication with Friends and Family: More than three times a week     Frequency of Social Gatherings with Friends and Family: More than three times a week     Attends [employer] Services: Never     Active Member of Clubs or Organizations: No     Attends Club or Organization Meetings: Never     Marital Status:    Housing Stability: Low Risk  (1/4/2024)    Housing Stability Vital Sign     Unable to Pay for Housing in the Last Year: No     Number of Places Lived in the Last Year: 1     Unstable Housing in the Last Year: No       Precautions:     Allergies as of 01/03/2024 - Reviewed 01/03/2024   Allergen Reaction Noted    Contrast media Hives, Itching, and Swelling 09/24/2014    Epinephrine Anaphylaxis 09/24/2014    Ibuprofen Hives, Itching, and Swelling 09/24/2014    Zofran [ondansetron hcl] Itching 03/04/2022    Iodinated contrast media      Morphine Other (See Comments) 04/03/2022    Sulfa (sulfonamide antibiotics) Hives, Itching, and Swelling 09/24/2014       [facility] Assessment Details/Treatment   Patient seen for wound care consultation to [facility].   Reviewed chart for this encounter.   See Flow Sheet for findings.      Per chart review. Patito Domingo is a 73 y.o. female with metastatic carcinoid tumor, history of CVA with debility, nephrolithiasis with history of ESBL/VRE UTI, anemia, diastolic CHF, chronic pain who presents today from [facility] for refusal to eat or drink. Wound care cleansed buttocks with soap and water. Wound care applied Mepilex to buttocks for protection and  prevention. Heels clear at this time. Pt denied any needs at this time.     RECOMMENDATIONS:  - IP Skin Integrity VALDEZ  - Waffle mattress  - Bedside nurse to perform wound care to buttocks:  Cleanse wound with soap and water  Pat dry.  Cover with a foam border (Mepilex)   Apply this Weekly.      Recommendations made to primary team for above plan via secured chat. Wound Care to follow up. Orders placed.     Discussed POC with patient and primary RN.   See EMR for orders & patient education.  Discussed POC with primary team.  VALDEZ Notified.    Nursing to continue care.  Nursing to maintain pressure injury prevention interventions.  Contact wound care for any further questions.         01/05/24 1430   WOCN Assessment   WOCN Total Time (mins) 30   Visit Date 01/05/24   Visit Time 1430   Consult Type New   WOCN Speciality Wound   Intervention assessed;changed;applied;chart review;coordination of care;orders   Teaching on-going        Altered Skin Integrity 01/04/24 0730 Coccyx Abrasion(s)   Date First Assessed/Time First Assessed: 01/04/24 0730   Altered Skin Integrity Present on Admission - Did Patient arrive to the hospital with altered skin?: yes  Location: Coccyx  Primary Wound Type: Abrasion(s)   Wound Image    Dressing Appearance No dressing   Drainage Amount None   Appearance Pink;Red;White;Dry   Care Cleansed with:;Soap and water   Dressing Applied;Foam   Dressing Change Due 01/12/24 01/05/2024

## 2024-01-05 NOTE — PLAN OF CARE
Problem: Adult Inpatient Plan of Care  Goal: Plan of Care Review  Outcome: Ongoing, Progressing  Flowsheets (Taken 1/4/2024 1853)  Plan of Care Reviewed With:   patient   son  Goal: Patient-Specific Goal (Individualized)  Outcome: Ongoing, Progressing  Goal: Readiness for Transition of Care  Outcome: Ongoing, Progressing     Problem: Fluid and Electrolyte Imbalance (Acute Kidney Injury/Impairment)  Goal: Fluid and Electrolyte Balance  Outcome: Ongoing, Progressing  Intervention: Monitor and Manage Fluid and Electrolyte Balance  Flowsheets (Taken 1/4/2024 9204)  Fluid/Electrolyte Management: electrolyte supplement initiated     Problem: Oral Intake Inadequate (Acute Kidney Injury/Impairment)  Goal: Optimal Nutrition Intake  Outcome: Ongoing, Progressing

## 2024-01-05 NOTE — PROGRESS NOTES
"[facility] Surg ([address] Medicine  Progress Note    Patient Name: Patito Domingo  MRN: [id number]  Patient Class: OP- Observation   Admission Date: 1/3/2024  Length of Stay: 0 days  Attending Physician: Katherine Solis DO  Primary Care Provider: Mariela Wei DO        Subjective:     Principal Problem:FLORECITA (acute kidney injury)        HPI:  Patito Domingo is a 73 y.o. female with metastatic carcinoid tumor, history of CVA with debility, nephrolithiasis with history of ESBL/VRE UTI, anemia, diastolic CHF, chronic pain who presents today from [facility] for refusal to eat or drink.     Patient states that she "is a little confused" but endorses lower-mid abdominal pain of a sharp nature that has been present for about a day or so. She also endorses dysuria. Denies n/v, fever, or diarrhea, however chart review shows that she has chronic diarrhea. She can only tell me that she takes Oxycodone, which helps with the pain. She endorses chronic back pain which has not changed recently.     Per the ED on discussion with her nursing home, she was sent her for refusal to eat or drink for the past couple of days, which is new for her.     Overview/Hospital Course:  Patient presented with refusal to eat, UA suggestive of UTI. Ertapenem started. Discussed case with son. Patient eating better (75% breakfast, 25% lunch). Continue curretn management. FLORECITA resolved. ID recommendations appreciated. CM currently working on placement. Patient is medically ready to be discharged.     Interval History: pleasant, reports that she is eating well, reports that rn is taking care of lower back and has applied cream (appreciate assistance), wound care also following.       Objective:     Vital Signs (Most Recent):  Temp: 98.4 °F (36.9 °C) (01/05/24 1615)  Pulse: 67 (01/05/24 1615)  Resp: 17 (01/05/24 1636)  BP: (!) 154/67 (01/05/24 1615)  SpO2: 95 % (01/05/24 1615) Vital Signs (24h Range):  Temp:  " [97.4 °F (36.3 °C)-98.4 °F (36.9 °C)] 98.4 °F (36.9 °C)  Pulse:  [50-70] 67  Resp:  [16-20] 17  SpO2:  [95 %-99 %] 95 %  BP: (126-160)/(58-68) 154/67     Weight: 72.1 kg (159 lb)  Body mass index is 25.66 kg/m².    Intake/Output Summary (Last 24 hours) at 1/5/2024 1637  Last data filed at 1/5/2024 0600  Gross per 24 hour   Intake 817.86 ml   Output --   Net 817.86 ml         Physical Exam  Vitals and nursing note reviewed.   Constitutional:       General: She is not in acute distress.     Appearance: She is ill-appearing (chronically). She is not toxic-appearing or diaphoretic.   HENT:      Head: Normocephalic and atraumatic.      Nose: No congestion.      Mouth/Throat:      Mouth: Mucous membranes are dry.   Eyes:      General: No scleral icterus.     Conjunctiva/sclera: Conjunctivae normal.   Cardiovascular:      Rate and Rhythm: Normal rate.      Heart sounds: Normal heart sounds. No murmur heard.  Pulmonary:      Effort: Pulmonary effort is normal. No respiratory distress.      Breath sounds: Normal breath sounds.   Abdominal:      General: Bowel sounds are normal. There is no distension.      Palpations: Abdomen is soft.      Tenderness: There is no abdominal tenderness.      Comments: Generalized mild-mod TTP throughout.    Musculoskeletal:         General: No swelling or tenderness.      Cervical back: No rigidity or tenderness.      Right lower leg: No edema.      Left lower leg: No edema.   Skin:     General: Skin is warm and dry.      Findings: No erythema or lesion.   Neurological:      General: No focal deficit present.      Mental Status: She is alert. Mental status is at baseline.   Psychiatric:         Mood and Affect: Mood normal.         Thought Content: Thought content normal.             Significant Labs: All pertinent labs within the past 24 hours have been reviewed.    Significant Imaging: I have reviewed all pertinent imaging results/findings within the past 24 hours.    Assessment/Plan:      *  FLORECITA (acute kidney injury)  Cr 1.1 on presentation, and baseline appears to be around 0.1. Suspect FLORECITA due to volume depletion from poor p.o. intake along with chronic ARB use.  We will continue IV fluid resuscitation while monitoring on subsequent labs.  Hold home losartan. Avoid nephrotoxic agents as able, and renally dose all meds as applicable.      Acute cystitis without hematuria  Urinalysis shows greater than 100 white blood cells with no epithelial cells and many bacteria.  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 She does endorse lower abdominal pain and dysuria.  She is associated mild leukocytosis, however this may be secondary to hemoconcentration.  She was given ertapenem and linezolid in the ED; we will consult Infectious Disease to assist with any further antibiotics given her history of MDRO.  ID recs to start with cipro 500 mg BID    Abnormal CT scan, lumbar spine  CT abdomen and pelvis shows compression deformity of the anterior superior aspect of L5, which is new since prior CT in 09/2023.  Patient reports chronic back pain, however is not fully oriented.  May consider inpatient neurosurgery consultation pending her course.      Other constipation  Severe constipation noted on abdominal CT, likely contributing to her abdominal pain and possible UTI.  This is secondary to debility and chronic opioid use.  We will start a bowel regimen and give an enema.      Chronic diastolic (congestive) heart failure  No evidence of acute exacerbation, however more likely with volume depletion.  We will continue IV fluids while monitoring volume status.    Moderate protein-calorie malnutrition  Likely due to metastatic carcinoid with other chronic comorbidiites. Around 20-lb weight loss noted since last summer per charted weights. May benefit from dietary supplements.     Elevated troponin  Troponin elevated without EKG changes, and no  complaints of chest pain.  Suspect this is due to renal insufficiency and volume depletion.  Continue home beta-blocker.      Anemia of chronic disease  Appears higher than recent baseline, however likely somewhat due to hemoconcentration. No indication for transfusion. Monitor.     Nephrolithiasis  Chronic, and noted on CT.  No acute issues.  Monitor.      Chronic pain syndrome  Cautiously continue home pain regimen in the setting of altered mental status and constipation.       Debility  Due to multiple comorbidities and history of CVA. Continue PT/OT upon discharge back to facility.     Metastatic carcinoid tumor  Known metastatic carcinoid with progressive mets on CT today. Likely contributing to her chronic debility. Continue outpatient follow up with Heme Onc.         VTE Risk Mitigation (From admission, onward)           Ordered     enoxaparin injection 40 mg  Every 24 hours         01/03/24 2007     IP VTE HIGH RISK PATIENT  Once         01/03/24 2007     Place sequential compression device  Until discontinued         01/03/24 2007                    Discharge Planning   JULIO C: 1/5/2024     Code Status: Full Code   Is the patient medically ready for discharge?:     Reason for patient still in hospital (select all that apply): Patient trending condition and Treatment  Discharge Plan A: Return to nursing home                  Katherine Solis DO  Department of Hospital Medicine   [facility] - Med Surg ([place])

## 2024-01-05 NOTE — CONSULTS
[facility] ([place] [address])  Infectious Disease  Consult Note    Patient Name: Patito Domingo  MRN: [id number]  Admission Date: 1/3/2024  Hospital Length of Stay: 0 days  Attending Physician: Katherine Solis DO  Primary Care Provider: Mariela Wei DO     Isolation Status: Contact    Patient information was obtained from patient and ER records.      Inpatient consult to Infectious Diseases  Consult performed by: Alfa Johns PA-C  Consult ordered by: Chris Torres MD        Assessment/Plan:     Renal/  Acute cystitis without hematuria  See renal stone dx.    Nephrolithiasis  I have reviewed hospital notes from  HM service and other specialty providers. I have also reviewed CBC, CMP/BMP,  cultures and imaging with my interpretation as documented.       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 Urology previously deferred surgery, patient previously refused surgery; failed suppressive minocycline and previously completed 6wks IV-Erta (ended ~03/2023) with outside ID, apparently in attempt to sterilize calculi until it can be removed. -  The patient now presented (01/03) from SNF for failure to thrive, refusing to eat or drink for past few days. Pt c/o chronic stable generalized abd pain (with recent worsening sharp pain at lower mid-abdominal pain for past 1-2d. Pt main complaint is significant generalized genital / groin irritation and pain; endorsing some dysuria at the end of urination.   -- Ddx: passing bladder stone? Vs. Irritant or fungal dermatitis / vaginitis vs. Renal stone associated UTI.     Ct-a/p: showed bilateral non-obstructive stones, noting  stable large R renal staghorn calculus, non-obstructive. Renal calculi noted in bladder. No hydronephrosis. Imaging also noting diffuse progression of metastatic liver lesions, and possible new tree-in-bud type nodules within the right lower lobe noting a few scattered 2-3 mm pulmonary nodules elsewhere. Also, Large amount of stool in the rectum concerning for constipation. No intra-abd fluid collections or abscesses noted. See CT report for other detailed findings.      Recommendations / Plan:  Discontinue empiric IV-Linezolid and Ertapenem.   To start oral Cipro 500mg BID, for 7d, SHANTE 01/10/24.  Recommend palliative care eval outpt or inpatient; for GOC discussions. Unclear if palliative seen pt during past OSH admissions.  Prior to pt d/c, recommend CM assist in scheduling pt f/u appt with pt's following Urologist at [facility], Dr. Koki Britt, for renal stone management. Also, may likely need assistance with scheduling appt with outpt palliative care, as she was referred to palliative on prior OSH admissions.         -- ID will schedule pt for ID clinic f/u appt   -- Discussed with ID staff and primary team.  -- ID will sign off at this time.         Thank you for your consult. I will sign off. Please contact us if you have any additional questions.    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 Urology previously deferred surgery, patient previously refused surgery; failed suppressive minocycline and previously completed 6wks IV-Erta (ended ~03/2023) with  outside ID, in attempt to sterilize calculi. -  The patient now presented (01/03) from SNF for failure to thrive, refusing to eat or drink for past few days. Per chart review; some reports of pt having some confusion, and lower mid-abdominal pain (sharp, onset yesterday), and some dysuria.      Pt afebrile, VSS, HDS; leukocytosis (14) on arrival (01/03), albumin 2.1.   Urine clean catch; UA wbc >100, many bacteria, rbc 4.  Ucx: GNR.  Bld cxs NGTD.    Pt started on Iv-linezolid and Ertapenem, c/f return of urosepsis considering pt presentation and h/o kidney stones with MDRO. ID consulted for further eval and abx management. Pt has yet to f/u with palliative care outpt for ongoing discussions of GOC.     Ct-a/p:  showed kidney stones b/L with notable large non-obstructing staghorn calculus R kidney. Calcific density noted within the urinary bladder may reflect sequela of passed calculi.  Also, shows diffuse hepatic malignancy noting progressive enlargement of low attenuating and rim enhancing lesions throughout parenchyma.    Large amount of stool in the rectum concerning for constipation.   There is a enhancing partially calcified soft tissue focus within the anterior aspect of the right lower quadrant measuring 4.3 x 2.2 cm, stable. No focal organized pelvic fluid collection.   Interval compression deformity involving the anterior superior aspect of L5, correlation with any focal tenderness recommended.   Tree-in-bud type nodules within the right lower lobe noting a few scattered 2-3 mm pulmonary nodules elsewhere; not noted on prior imaging, new? Developing right lower lobe infectious process not excluded.        Past Medical History:   Diagnosis Date    Anemia of chronic disease     Also with iron deficiency    Bacteremia due to Klebsiella pneumoniae     Cataract     Chronic diastolic (congestive) heart failure     Colon cancer     Encounter for blood transfusion     History of ESBL E. 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)        Past Surgical History:   Procedure Laterality Date    ABDOMINAL SURGERY      CATARACT EXTRACTION Left 10/2017     SECTION      CHOLECYSTECTOMY      COLON SURGERY      cystoscope      CYSTOSCOPY W/ RETROGRADES Right 10/10/2019    Procedure: CYSTOSCOPY, WITH RETROGRADE PYELOGRAM;  Surgeon: Gen Isbell MD;  Location: [facility];  Service: Urology;  Laterality: Right;    ERCP N/A 2021    Procedure: ERCP (ENDOSCOPIC RETROGRADE CHOLANGIOPANCREATOGRAPHY);  Surgeon: Jayjay Rivera MD;  Location: [facility] ENDO (2ND FLR);  Service: Endoscopy;  Laterality: N/A;    ERCP N/A 3/4/2022    Procedure: ERCP (ENDOSCOPIC RETROGRADE CHOLANGIOPANCREATOGRAPHY);  Surgeon: Roby Virgen MD;  Location: [facility] ENDO (2ND FLR);  Service: Endoscopy;  Laterality: N/A;    EYE SURGERY      HYSTERECTOMY  1996    LITHOTRIPSY      LIVER BIOPSY      carcinoid    URETEROSCOPY Right 10/10/2019    Procedure: URETEROSCOPY;  Surgeon: Gen Isbell MD;  Location: [facility] OR;  Service: Urology;  Laterality: Right;    UTERINE FIBROID SURGERY         Review of patient's allergies indicates:   Allergen Reactions    Contrast media Hives, Itching and Swelling    Epinephrine Anaphylaxis     Can cause  a Carcinoid Crisis    Ibuprofen Hives, Itching and Swelling    Zofran [ondansetron hcl] Itching     And multiple other reactions    Iodinated contrast media     Morphine Other (See Comments)    Sulfa (sulfonamide antibiotics) Hives, Itching  and Swelling       Medications:  Medications Prior to Admission   Medication Sig    acetaminophen (TYLENOL) 325 MG tablet Take 650 mg by mouth every 6 (six) hours.    amLODIPine (NORVASC) 10 MG tablet Take 10 mg by mouth once daily.    DAILY PROBIOTIC, S. BOULARDII, 250 mg capsule Take 250 mg by mouth once daily.    diphenoxylate-atropine 2.5-0.025 mg (LOMOTIL) 2.5-0.025 mg per tablet Take 1 tablet by mouth daily as needed for Diarrhea.    losartan (COZAAR) 100 MG tablet Take 100 mg by mouth once daily.    metoprolol tartrate (LOPRESSOR) 50 MG tablet Take 50 mg by mouth 2 (two) times daily.    multivitamin (THERAGRAN) per tablet Take 1 tablet by mouth once daily.    oxyCODONE (ROXICODONE) 15 MG Tab Take 1 tablet (15 mg total) by mouth every 6 (six) hours as needed for Pain.    polyethylene glycol (GLYCOLAX) 17 gram PwPk Take 17 g by mouth once daily.    potassium chloride SA (K-DUR,KLOR-CON) 20 MEQ tablet Take 1 tablet (20 mEq total) by mouth once daily.    senna (SENOKOT) 8.6 mg tablet Take 1 tablet by mouth once daily.    water Liqd 150 mL with MILK OF MAGNESIA 400 mg/5 mL Susp 400 mg, diphenhydrAMINE 12.5 mg/5 mL Elix 60 mg, nystatin 100,000 unit/mL Susp 500,000 Units Take 5 mLs by mouth 2 (two) times daily as needed (pain).     Antibiotics (From admission, onward)      Start     Stop Route Frequency Ordered    01/04/24 1900  ertapenem (INVANZ) 1 g in sodium chloride 0.9 % 100 mL IVPB (MB+)         -- IV Every 24 hours (non-standard times) 01/04/24 1759          Antifungals (From admission, onward)      None          Antivirals (From admission, onward)      None             Immunization History   Administered Date(s) Administered    COVID-19, MRNA, LN-S, PF (Pfizer) (Purple Cap) 07/25/2021, 08/14/2021    Influenza 11/06/2012    Influenza - High Dose - PF (65 years and older) 10/12/2017, 10/12/2018, 03/02/2020    Influenza - Trivalent (ADULT) 11/06/2012    PPD Test 07/08/2022    Pneumococcal Conjugate - 13 Valent  10/12/2017    Pneumococcal Polysaccharide - 23 Valent 03/02/2020       Family History       Problem Relation (Age of Onset)    Alzheimer's disease Father    Cancer Mother    No Known Problems Son, Son, Son, Son    Stroke Sister          Social History     Socioeconomic History    Marital status:    Occupational History    Occupation: disabled    Tobacco Use    Smoking status: Never    Smokeless tobacco: Never   Substance and Sexual Activity    Alcohol use: No     Alcohol/week: 0.0 standard drinks of alcohol    Drug use: No    Sexual activity: Not Currently   Social History Narrative    Pt lives with son     Social Determinants of Health     Financial Resource Strain: Low Risk  (1/4/2024)    Overall Financial Resource Strain (CARDIA)     Difficulty of Paying Living Expenses: Not hard at all   Food Insecurity: No Food Insecurity (1/4/2024)    Hunger Vital Sign     Worried About Running Out of Food in the Last Year: Never true     Ran Out of Food in the Last Year: Never true   Transportation Needs: No Transportation Needs (1/4/2024)    PRAPARE - Transportation     Lack of Transportation (Medical): No     Lack of Transportation (Non-Medical): No   Physical Activity: Inactive (1/4/2024)    Exercise Vital Sign     Days of Exercise per Week: 0 days     Minutes of Exercise per Session: 0 min   Stress: Patient Declined (1/4/2024)    [employer] - Occupational Stress Questionnaire     Feeling of Stress : Patient declined   Social Connections: Socially Isolated (1/4/2024)    Social Connection and Isolation Panel [NHANES]     Frequency of Communication with Friends and Family: More than three times a week     Frequency of Social Gatherings with Friends and Family: More than three times a week     Attends Jain Services: Never     Active Member of Clubs or Organizations: No     Attends Club or Organization Meetings: Never     Marital Status:    Housing Stability:  Low Risk  (1/4/2024)    Housing Stability Vital Sign     Unable to Pay for Housing in the Last Year: No     Number of Places Lived in the Last Year: 1     Unstable Housing in the Last Year: No     Review of Systems   Constitutional:  Positive for appetite change and fatigue. Negative for chills, diaphoresis and fever.   HENT:  Negative for congestion, sinus pain and trouble swallowing.    Eyes:  Negative for pain.   Respiratory:  Negative for cough, choking and shortness of breath.    Cardiovascular:  Negative for chest pain and leg swelling.   Gastrointestinal:  Positive for abdominal pain and constipation. Negative for abdominal distention, blood in stool, diarrhea, nausea, rectal pain and vomiting.   Genitourinary:  Positive for dysuria, flank pain and vaginal pain. Negative for vaginal bleeding and vaginal discharge.   Musculoskeletal:  Positive for back pain. Negative for arthralgias, joint swelling, neck pain and neck stiffness.   Skin:  Negative for wound.   Neurological:  Positive for weakness. Negative for dizziness, seizures and syncope.   Psychiatric/Behavioral:  Positive for confusion. Negative for agitation.      Objective:     Vital Signs (Most Recent):  Temp: 97.4 °F (36.3 °C) (01/05/24 0945)  Pulse: 63 (01/05/24 0945)  Resp: 17 (01/05/24 0945)  BP: (!) 160/67 (01/05/24 0945)  SpO2: 98 % (01/05/24 0945) Vital Signs (24h Range):  Temp:  [97.4 °F (36.3 °C)-98.1 °F (36.7 °C)] 97.4 °F (36.3 °C)  Pulse:  [50-76] 63  Resp:  [16-20] 17  SpO2:  [95 %-100 %] 98 %  BP: (126-160)/(58-68) 160/67     Weight: 72.1 kg (159 lb)  Body mass index is 25.66 kg/m².    Estimated Creatinine Clearance: 47.7 mL/min (based on SCr of 1.1 mg/dL).     Physical Exam  Vitals and nursing note reviewed.   Constitutional:       General: She is not in acute distress.     Appearance: She is ill-appearing (chronically). She is not toxic-appearing or diaphoretic.   HENT:      Head: Normocephalic and atraumatic.      Nose: No congestion.       Mouth/Throat:      Mouth: Mucous membranes are dry.   Eyes:      General: No scleral icterus.     Conjunctiva/sclera: Conjunctivae normal.   Cardiovascular:      Rate and Rhythm: Normal rate.      Heart sounds: Normal heart sounds. No murmur heard.  Pulmonary:      Effort: Pulmonary effort is normal. No respiratory distress.      Breath sounds: Normal breath sounds.   Abdominal:      General: Bowel sounds are normal. There is no distension.      Palpations: Abdomen is soft.      Tenderness: There is abdominal tenderness. There is right CVA tenderness and left CVA tenderness.      Comments: Generalized mild-mod TTP throughout.    Genitourinary:     Comments: Topical ointment cream noted at external genitalia / groin. Vaginal exam limited by body habitus and pain during light palpation on exam. No obvious rashes or lesions.  Musculoskeletal:         General: No swelling or tenderness.      Cervical back: No rigidity or tenderness.      Right lower leg: No edema.      Left lower leg: No edema.   Skin:     General: Skin is warm and dry.      Findings: No erythema or lesion.   Neurological:      General: No focal deficit present.      Mental Status: She is alert. Mental status is at baseline.   Psychiatric:         Mood and Affect: Mood normal.         Thought Content: Thought content normal.          Significant Labs: Blood Culture:   Recent Labs   Lab 09/26/23  1145 01/03/24  1205   LABBLOO No growth after 5 days.  No growth after 5 days. No Growth to date  No Growth to date  No Growth to date  No Growth to date     CBC:   Recent Labs   Lab 01/03/24  1204 01/03/24  1214 01/03/24  1315 01/04/24  0525 01/05/24  0858   WBC 14.54*  --   --  11.70 9.73   HGB 9.5*  --   --  8.4* 8.6*   HCT 32.4*   < > 27* 28.0* 28.6*     --   --  232 253    < > = values in this interval not displayed.     CMP:   Recent Labs   Lab 01/03/24  1425 01/04/24  0526 01/05/24  0858    139 140   K 4.2 4.9 4.6   * 110 111*    CO2 19* 20* 20*    127* 145*   BUN 31* 27* 36*   CREATININE 1.1 0.8 1.1   CALCIUM 8.3* 8.7 8.8   PROT 6.6  --  6.6   ALBUMIN 2.1*  --  2.0*   BILITOT 0.5  --  0.2   ALKPHOS 142*  --  157*   AST 24  --  15   ALT 5*  --  <5*   ANIONGAP 11 9 9     Microbiology Results (last 7 days)       Procedure Component Value Units Date/Time    Blood culture #1 **CANNOT BE ORDERED STAT** [[id number]] Collected: 01/03/24 1205    Order Status: Completed Specimen: Blood from Peripheral, Forearm, Right Updated: 01/04/24 1412     Blood Culture, Routine No Growth to date      No Growth to date    Blood culture #2 **CANNOT BE ORDERED STAT** [[id number]] Collected: 01/03/24 1205    Order Status: Completed Specimen: Blood from Peripheral, Forearm, Right Updated: 01/04/24 1412     Blood Culture, Routine No Growth to date      No Growth to date    Urine culture [[id number]]  (Abnormal) Collected: 01/03/24 1334    Order Status: Completed Specimen: Urine Updated: 01/04/24 0939     Urine Culture, Routine GRAM NEGATIVE JADEN  >100,000 cfu/ml  Identification and susceptibility pending      Narrative:      Specimen Source->Urine          Urine Culture:   Recent Labs   Lab 09/26/23  0347 01/03/24  1334   LABURIN No significant growth GRAM NEGATIVE JADEN  >100,000 cfu/ml  Identification and susceptibility pending  *     Urine Studies:   Recent Labs   Lab 09/26/23  0347 10/19/23  1711 11/21/23  1826 01/03/24  1334   COLORU Yellow   < > Yellow Yellow   APPEARANCEUA Clear   < >  --  Ex.Turbid   PHUR 7.0   < >  --  5.0   SPECGRAV 1.015   < >  --  1.020   PROTEINUA Trace*   < >  --  1+*   GLUCUA Negative   < > Negative Negative   KETONESU Negative   < >  --  Negative   BILIRUBINUA Negative   < > Negative Negative   OCCULTUA Trace*   < > Trace* 1+*   NITRITE Negative   < >  --  Negative   UROBILINOGEN Negative   < > 0.2  --    LEUKOCYTESUR 1+*   < > 3+* 3+*   RBCUA 4   < >  --  4   WBCUA 65*   < >  --  >100*   BACTERIA Few*   < >  --  Many*    SQUAMEPITHEL 4  --   --   --    HYALINECASTS  --   --   --  9*    < > = values in this interval not displayed.     All pertinent labs within the past 24 hours have been reviewed.    Significant Imaging: I have reviewed all pertinent imaging results/findings within the past 24 hours.    I have personally reviewed records / hospital notes from   service and other specialty providers. I have also reviewed CBC, CMP/BMP,  cultures and imaging with my interpretation as documented in my assessment / plan.    Patient is high risk for infectious complications given pt's age, multiple co-morbidities, and case complexity.      Time: 75 minutes   50% of time spent on face-to-face counseling and coordination of care. Counseling included review of test results, diagnosis, and treatment plan with patient and/or family.

## 2024-01-05 NOTE — ASSESSMENT & PLAN NOTE
Urinalysis shows greater than 100 white blood cells with no epithelial cells and many bacteria.  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 She does endorse lower abdominal pain and dysuria.  She is associated mild leukocytosis, however this may be secondary to hemoconcentration.  She was given ertapenem and linezolid in the ED; we will consult Infectious Disease to assist with any further antibiotics given her history of MDRO.  ID recs to start with cipro 500 mg BID

## 2024-01-05 NOTE — SUBJECTIVE & OBJECTIVE
Interval History: pleasant, reports that she is eating well, reports that rn is taking care of lower back and has applied cream (appreciate assistance), wound care also following.       Objective:     Vital Signs (Most Recent):  Temp: 98.4 °F (36.9 °C) (01/05/24 1615)  Pulse: 67 (01/05/24 1615)  Resp: 17 (01/05/24 1636)  BP: (!) 154/67 (01/05/24 1615)  SpO2: 95 % (01/05/24 1615) Vital Signs (24h Range):  Temp:  [97.4 °F (36.3 °C)-98.4 °F (36.9 °C)] 98.4 °F (36.9 °C)  Pulse:  [50-70] 67  Resp:  [16-20] 17  SpO2:  [95 %-99 %] 95 %  BP: (126-160)/(58-68) 154/67     Weight: 72.1 kg (159 lb)  Body mass index is 25.66 kg/m².    Intake/Output Summary (Last 24 hours) at 1/5/2024 1637  Last data filed at 1/5/2024 0600  Gross per 24 hour   Intake 817.86 ml   Output --   Net 817.86 ml         Physical Exam  Vitals and nursing note reviewed.   Constitutional:       General: She is not in acute distress.     Appearance: She is ill-appearing (chronically). She is not toxic-appearing or diaphoretic.   HENT:      Head: Normocephalic and atraumatic.      Nose: No congestion.      Mouth/Throat:      Mouth: Mucous membranes are dry.   Eyes:      General: No scleral icterus.     Conjunctiva/sclera: Conjunctivae normal.   Cardiovascular:      Rate and Rhythm: Normal rate.      Heart sounds: Normal heart sounds. No murmur heard.  Pulmonary:      Effort: Pulmonary effort is normal. No respiratory distress.      Breath sounds: Normal breath sounds.   Abdominal:      General: Bowel sounds are normal. There is no distension.      Palpations: Abdomen is soft.      Tenderness: There is no abdominal tenderness.      Comments: Generalized mild-mod TTP throughout.    Musculoskeletal:         General: No swelling or tenderness.      Cervical back: No rigidity or tenderness.      Right lower leg: No edema.      Left lower leg: No edema.   Skin:     General: Skin is warm and dry.      Findings: No erythema or lesion.   Neurological:      General: No  focal deficit present.      Mental Status: She is alert. Mental status is at baseline.   Psychiatric:         Mood and Affect: Mood normal.         Thought Content: Thought content normal.             Significant Labs: All pertinent labs within the past 24 hours have been reviewed.    Significant Imaging: I have reviewed all pertinent imaging results/findings within the past 24 hours.

## 2024-01-05 NOTE — ASSESSMENT & PLAN NOTE
I have reviewed hospital notes from   service and other specialty providers. I have also reviewed CBC, CMP/BMP,  cultures and imaging with my interpretation as documented.       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 Urology previously deferred surgery, patient previously refused surgery; failed suppressive minocycline and previously completed 6wks IV-Erta (ended ~03/2023) with outside ID, apparently in attempt to sterilize calculi until it can be removed. -  The patient now presented (01/03) from [facility] for failure to thrive, refusing to eat or drink for past few days. Pt c/o chronic stable generalized abd pain (with recent worsening sharp pain at lower mid-abdominal pain for past 1-2d. Pt main complaint is significant generalized genital / groin irritation and pain; endorsing some dysuria at the end of urination.   -- Ddx: passing bladder stone? Vs. Irritant or fungal dermatitis / vaginitis vs. Renal stone associated UTI.     Ct-a/p: showed bilateral non-obstructive stones, noting stable large R renal staghorn calculus, non-obstructive. Renal calculi noted in bladder. No hydronephrosis. Imaging also noting diffuse progression of metastatic liver lesions, and possible new tree-in-bud type nodules within the right lower lobe noting a few scattered 2-3 mm pulmonary nodules elsewhere. Also, Large amount of stool in the rectum concerning for constipation. No intra-abd fluid collections or abscesses noted. See CT report for other detailed findings.      Recommendations / Plan:  Discontinue empiric IV-Linezolid and Ertapenem.   To start oral Cipro 500mg BID, for 7d, SHANTE 01/10/24.  Recommend  palliative care eval outpt or inpatient; for GOC discussions. Unclear if palliative seen pt during past OSH admissions.  Prior to pt d/c, recommend CM assist in scheduling pt f/u appt with pt's following Urologist at [facility], Dr. Koki Britt, for renal stone management. Also, may likely need assistance with scheduling appt with outpt palliative care, as she was referred to palliative on prior OSH admissions.         -- ID will schedule pt for ID clinic f/u appt   -- Discussed with ID staff and primary team.  -- ID will sign off at this time.

## 2024-01-05 NOTE — HOSPITAL COURSE
Patient presented with refusal to eat,   Ertapenem started empirically for UTI given hx of ESBL. FLROECITA resolved w/ supportive measures. Tolerating po now. ID recommendations appreciated - pt switched to cipro for pan sensitive ecoli. .

## 2024-01-06 LAB
ALBUMIN SERPL BCP-MCNC: 2 G/DL (ref 3.5–5.2)
ALP SERPL-CCNC: 174 U/L (ref 55–135)
ALT SERPL W/O P-5'-P-CCNC: 6 U/L (ref 10–44)
ANION GAP SERPL CALC-SCNC: 8 MMOL/L (ref 8–16)
AST SERPL-CCNC: 17 U/L (ref 10–40)
BILIRUB SERPL-MCNC: 0.2 MG/DL (ref 0.1–1)
BUN SERPL-MCNC: 34 MG/DL (ref 8–23)
CALCIUM SERPL-MCNC: 8.7 MG/DL (ref 8.7–10.5)
CHLORIDE SERPL-SCNC: 110 MMOL/L (ref 95–110)
CO2 SERPL-SCNC: 23 MMOL/L (ref 23–29)
CREAT SERPL-MCNC: 0.8 MG/DL (ref 0.5–1.4)
ERYTHROCYTE [DISTWIDTH] IN BLOOD BY AUTOMATED COUNT: 19.8 % (ref 11.5–14.5)
EST. GFR  (NO RACE VARIABLE): >60 ML/MIN/1.73 M^2
GLUCOSE SERPL-MCNC: 102 MG/DL (ref 70–110)
HCT VFR BLD AUTO: 27.3 % (ref 37–48.5)
HGB BLD-MCNC: 8.1 G/DL (ref 12–16)
MCH RBC QN AUTO: 24.3 PG (ref 27–31)
MCHC RBC AUTO-ENTMCNC: 29.7 G/DL (ref 32–36)
MCV RBC AUTO: 82 FL (ref 82–98)
PLATELET # BLD AUTO: 253 K/UL (ref 150–450)
PMV BLD AUTO: 10.8 FL (ref 9.2–12.9)
POTASSIUM SERPL-SCNC: 4.2 MMOL/L (ref 3.5–5.1)
PROT SERPL-MCNC: 6.2 G/DL (ref 6–8.4)
RBC # BLD AUTO: 3.33 M/UL (ref 4–5.4)
SODIUM SERPL-SCNC: 141 MMOL/L (ref 136–145)
WBC # BLD AUTO: 8.23 K/UL (ref 3.9–12.7)

## 2024-01-06 PROCEDURE — 25000003 PHARM REV CODE 250: Mod: HCNC | Performed by: STUDENT IN AN ORGANIZED HEALTH CARE EDUCATION/TRAINING PROGRAM

## 2024-01-06 PROCEDURE — 63600175 PHARM REV CODE 636 W HCPCS: Mod: HCNC | Performed by: STUDENT IN AN ORGANIZED HEALTH CARE EDUCATION/TRAINING PROGRAM

## 2024-01-06 PROCEDURE — 85027 COMPLETE CBC AUTOMATED: CPT | Mod: HCNC | Performed by: STUDENT IN AN ORGANIZED HEALTH CARE EDUCATION/TRAINING PROGRAM

## 2024-01-06 PROCEDURE — 36415 COLL VENOUS BLD VENIPUNCTURE: CPT | Mod: HCNC | Performed by: STUDENT IN AN ORGANIZED HEALTH CARE EDUCATION/TRAINING PROGRAM

## 2024-01-06 PROCEDURE — 96372 THER/PROPH/DIAG INJ SC/IM: CPT | Performed by: STUDENT IN AN ORGANIZED HEALTH CARE EDUCATION/TRAINING PROGRAM

## 2024-01-06 PROCEDURE — 80053 COMPREHEN METABOLIC PANEL: CPT | Mod: HCNC | Performed by: STUDENT IN AN ORGANIZED HEALTH CARE EDUCATION/TRAINING PROGRAM

## 2024-01-06 PROCEDURE — G0378 HOSPITAL OBSERVATION PER HR: HCPCS | Mod: HCNC

## 2024-01-06 RX ADMIN — METOPROLOL TARTRATE 50 MG: 50 TABLET, FILM COATED ORAL at 08:01

## 2024-01-06 RX ADMIN — CIPROFLOXACIN 750 MG: 750 TABLET, FILM COATED ORAL at 09:01

## 2024-01-06 RX ADMIN — Medication 1 CAPSULE: at 09:01

## 2024-01-06 RX ADMIN — OXYCODONE HYDROCHLORIDE 15 MG: 10 TABLET ORAL at 02:01

## 2024-01-06 RX ADMIN — OXYCODONE HYDROCHLORIDE 15 MG: 10 TABLET ORAL at 04:01

## 2024-01-06 RX ADMIN — OXYCODONE HYDROCHLORIDE 15 MG: 10 TABLET ORAL at 08:01

## 2024-01-06 RX ADMIN — POLYETHYLENE GLYCOL 3350 17 G: 17 POWDER, FOR SOLUTION ORAL at 08:01

## 2024-01-06 RX ADMIN — METOPROLOL TARTRATE 50 MG: 50 TABLET, FILM COATED ORAL at 09:01

## 2024-01-06 RX ADMIN — CIPROFLOXACIN 750 MG: 750 TABLET, FILM COATED ORAL at 10:01

## 2024-01-06 RX ADMIN — POLYETHYLENE GLYCOL 3350 17 G: 17 POWDER, FOR SOLUTION ORAL at 09:01

## 2024-01-06 RX ADMIN — ENOXAPARIN SODIUM 40 MG: 40 INJECTION SUBCUTANEOUS at 06:01

## 2024-01-06 NOTE — PLAN OF CARE
Problem: Infection  Goal: Absence of Infection Signs and Symptoms  Outcome: Ongoing, Progressing     Problem: Infection  Goal: Absence of Infection Signs and Symptoms  Outcome: Ongoing, Progressing     Problem: Adult Inpatient Plan of Care  Goal: Plan of Care Review  Outcome: Ongoing, Progressing  Goal: Patient-Specific Goal (Individualized)  Outcome: Ongoing, Progressing  Goal: Absence of Hospital-Acquired Illness or Injury  Outcome: Ongoing, Progressing  Goal: Optimal Comfort and Wellbeing  Outcome: Ongoing, Progressing  Goal: Readiness for Transition of Care  Outcome: Ongoing, Progressing     Problem: Adult Inpatient Plan of Care  Goal: Plan of Care Review  Outcome: Ongoing, Progressing     Problem: Adult Inpatient Plan of Care  Goal: Patient-Specific Goal (Individualized)  Outcome: Ongoing, Progressing     Problem: Adult Inpatient Plan of Care  Goal: Absence of Hospital-Acquired Illness or Injury  Outcome: Ongoing, Progressing     Problem: Adult Inpatient Plan of Care  Goal: Optimal Comfort and Wellbeing  Outcome: Ongoing, Progressing     Problem: Adult Inpatient Plan of Care  Goal: Readiness for Transition of Care  Outcome: Ongoing, Progressing     Problem: Glycemic Control Impaired (Sepsis/Septic Shock)  Goal: Blood Glucose Level Within Desired Range  Outcome: Ongoing, Progressing     Problem: Glycemic Control Impaired (Sepsis/Septic Shock)  Goal: Blood Glucose Level Within Desired Range  Outcome: Ongoing, Progressing     Problem: Infection Progression (Sepsis/Septic Shock)  Goal: Absence of Infection Signs and Symptoms  Outcome: Ongoing, Progressing     Problem: Infection Progression (Sepsis/Septic Shock)  Goal: Absence of Infection Signs and Symptoms  Outcome: Ongoing, Progressing     Problem: Fluid and Electrolyte Imbalance (Acute Kidney Injury/Impairment)  Goal: Fluid and Electrolyte Balance  Outcome: Ongoing, Progressing     Problem: Fluid and Electrolyte Imbalance (Acute Kidney  Injury/Impairment)  Goal: Fluid and Electrolyte Balance  Outcome: Ongoing, Progressing     Problem: Oral Intake Inadequate (Acute Kidney Injury/Impairment)  Goal: Optimal Nutrition Intake  Outcome: Ongoing, Progressing     Problem: Oral Intake Inadequate (Acute Kidney Injury/Impairment)  Goal: Optimal Nutrition Intake  Outcome: Ongoing, Progressing

## 2024-01-06 NOTE — PLAN OF CARE
Problem: Adult Inpatient Plan of Care  Goal: Plan of Care Review  Outcome: Ongoing, Not Progressing  Goal: Optimal Comfort and Wellbeing  Outcome: Ongoing, Not Progressing     Problem: Renal Function Impairment (Acute Kidney Injury/Impairment)  Goal: Effective Renal Function  1/5/2024 1829 by Sonia Chopra RN  Outcome: Ongoing, Not Progressing    Alert and verbal. Continues iv abx. Pain management in place as needed due to pelvic discomfort.

## 2024-01-07 VITALS
OXYGEN SATURATION: 99 % | TEMPERATURE: 98 F | DIASTOLIC BLOOD PRESSURE: 58 MMHG | SYSTOLIC BLOOD PRESSURE: 122 MMHG | RESPIRATION RATE: 18 BRPM | HEART RATE: 74 BPM

## 2024-01-07 PROBLEM — D63.8 ANEMIA OF CHRONIC DISEASE: Chronic | Status: RESOLVED | Noted: 2019-11-19 | Resolved: 2024-01-07

## 2024-01-07 PROBLEM — E44.0 MODERATE PROTEIN-CALORIE MALNUTRITION: Chronic | Status: RESOLVED | Noted: 2021-11-15 | Resolved: 2024-01-07

## 2024-01-07 LAB
ALBUMIN SERPL BCP-MCNC: 2.1 G/DL (ref 3.5–5.2)
ALP SERPL-CCNC: 170 U/L (ref 55–135)
ALT SERPL W/O P-5'-P-CCNC: 9 U/L (ref 10–44)
ANION GAP SERPL CALC-SCNC: 7 MMOL/L (ref 8–16)
AST SERPL-CCNC: 17 U/L (ref 10–40)
BILIRUB SERPL-MCNC: 0.3 MG/DL (ref 0.1–1)
BUN SERPL-MCNC: 22 MG/DL (ref 8–23)
CALCIUM SERPL-MCNC: 8.4 MG/DL (ref 8.7–10.5)
CHLORIDE SERPL-SCNC: 109 MMOL/L (ref 95–110)
CO2 SERPL-SCNC: 25 MMOL/L (ref 23–29)
CREAT SERPL-MCNC: 0.7 MG/DL (ref 0.5–1.4)
ERYTHROCYTE [DISTWIDTH] IN BLOOD BY AUTOMATED COUNT: 19.9 % (ref 11.5–14.5)
EST. GFR  (NO RACE VARIABLE): >60 ML/MIN/1.73 M^2
GLUCOSE SERPL-MCNC: 95 MG/DL (ref 70–110)
HCT VFR BLD AUTO: 26 % (ref 37–48.5)
HGB BLD-MCNC: 7.9 G/DL (ref 12–16)
MCH RBC QN AUTO: 24.1 PG (ref 27–31)
MCHC RBC AUTO-ENTMCNC: 30.4 G/DL (ref 32–36)
MCV RBC AUTO: 79 FL (ref 82–98)
PLATELET # BLD AUTO: 203 K/UL (ref 150–450)
PMV BLD AUTO: 10.1 FL (ref 9.2–12.9)
POTASSIUM SERPL-SCNC: 4.2 MMOL/L (ref 3.5–5.1)
PROT SERPL-MCNC: 6.1 G/DL (ref 6–8.4)
RBC # BLD AUTO: 3.28 M/UL (ref 4–5.4)
SODIUM SERPL-SCNC: 141 MMOL/L (ref 136–145)
WBC # BLD AUTO: 7.01 K/UL (ref 3.9–12.7)

## 2024-01-07 PROCEDURE — 25000003 PHARM REV CODE 250: Mod: HCNC | Performed by: HOSPITALIST

## 2024-01-07 PROCEDURE — 36415 COLL VENOUS BLD VENIPUNCTURE: CPT | Mod: HCNC | Performed by: STUDENT IN AN ORGANIZED HEALTH CARE EDUCATION/TRAINING PROGRAM

## 2024-01-07 PROCEDURE — 80053 COMPREHEN METABOLIC PANEL: CPT | Mod: HCNC | Performed by: STUDENT IN AN ORGANIZED HEALTH CARE EDUCATION/TRAINING PROGRAM

## 2024-01-07 PROCEDURE — 63600175 PHARM REV CODE 636 W HCPCS: Mod: HCNC | Performed by: STUDENT IN AN ORGANIZED HEALTH CARE EDUCATION/TRAINING PROGRAM

## 2024-01-07 PROCEDURE — 25000003 PHARM REV CODE 250: Mod: HCNC | Performed by: STUDENT IN AN ORGANIZED HEALTH CARE EDUCATION/TRAINING PROGRAM

## 2024-01-07 PROCEDURE — 96372 THER/PROPH/DIAG INJ SC/IM: CPT | Performed by: STUDENT IN AN ORGANIZED HEALTH CARE EDUCATION/TRAINING PROGRAM

## 2024-01-07 PROCEDURE — G0378 HOSPITAL OBSERVATION PER HR: HCPCS | Mod: HCNC

## 2024-01-07 PROCEDURE — 85027 COMPLETE CBC AUTOMATED: CPT | Mod: HCNC | Performed by: STUDENT IN AN ORGANIZED HEALTH CARE EDUCATION/TRAINING PROGRAM

## 2024-01-07 RX ORDER — LOSARTAN POTASSIUM 25 MG/1
25 TABLET ORAL DAILY
Status: DISCONTINUED | OUTPATIENT
Start: 2024-01-07 | End: 2024-01-11 | Stop reason: HOSPADM

## 2024-01-07 RX ORDER — LIDOCAINE 50 MG/G
1 PATCH TOPICAL
Status: DISCONTINUED | OUTPATIENT
Start: 2024-01-07 | End: 2024-01-11 | Stop reason: HOSPADM

## 2024-01-07 RX ADMIN — LOSARTAN POTASSIUM 25 MG: 25 TABLET, FILM COATED ORAL at 07:01

## 2024-01-07 RX ADMIN — OXYCODONE HYDROCHLORIDE 15 MG: 10 TABLET ORAL at 04:01

## 2024-01-07 RX ADMIN — LIDOCAINE 5% 1 PATCH: 700 PATCH TOPICAL at 09:01

## 2024-01-07 RX ADMIN — OXYCODONE HYDROCHLORIDE 15 MG: 10 TABLET ORAL at 09:01

## 2024-01-07 RX ADMIN — POLYETHYLENE GLYCOL 3350 17 G: 17 POWDER, FOR SOLUTION ORAL at 09:01

## 2024-01-07 RX ADMIN — OXYCODONE HYDROCHLORIDE 15 MG: 10 TABLET ORAL at 02:01

## 2024-01-07 RX ADMIN — ENOXAPARIN SODIUM 40 MG: 40 INJECTION SUBCUTANEOUS at 09:01

## 2024-01-07 RX ADMIN — METOPROLOL TARTRATE 50 MG: 50 TABLET, FILM COATED ORAL at 09:01

## 2024-01-07 RX ADMIN — CIPROFLOXACIN 750 MG: 750 TABLET, FILM COATED ORAL at 11:01

## 2024-01-07 RX ADMIN — Medication 1 CAPSULE: at 09:01

## 2024-01-07 RX ADMIN — ACETAMINOPHEN 650 MG: 325 TABLET ORAL at 06:01

## 2024-01-07 NOTE — SUBJECTIVE & OBJECTIVE
Interval History:  Patient reports buttocks soreness secondary to likely contact dermatitis/rash from bowel movements.  Patient asking for staff to take her to the commode and set up having a bowel movement in the bed.  No chest pain or shortness a breath today.  Tolerating p.o. intake.  Nurse reports the bowel movements are soft.    Review of Systems  Objective:     Vital Signs (Most Recent):  Temp: 98.3 °F (36.8 °C) (01/07/24 1200)  Pulse: 69 (01/07/24 1200)  Resp: 17 (01/07/24 1200)  BP: 137/63 (01/07/24 1200)  SpO2: 99 % (01/07/24 1200) Vital Signs (24h Range):  Temp:  [98.1 °F (36.7 °C)-99.3 °F (37.4 °C)] 98.3 °F (36.8 °C)  Pulse:  [64-81] 69  Resp:  [16-20] 17  SpO2:  [97 %-99 %] 99 %  BP: (132-151)/(62-70) 137/63     Weight: 72.1 kg (159 lb)  Body mass index is 25.66 kg/m².    Intake/Output Summary (Last 24 hours) at 1/7/2024 1629  Last data filed at 1/7/2024 1406  Gross per 24 hour   Intake 840 ml   Output 250 ml   Net 590 ml         Physical Exam      Clear lungs bilaterally, unlabored breathing, on room air, no cyanosis   Heart sounds indicate a regular rate and rhythm  Awake alert, no acute distress  No facial, no slurred speech   Abdomen nondistended   No obvious lower extremity edema

## 2024-01-07 NOTE — SUBJECTIVE & OBJECTIVE
Interval History: pleasant, denies any complaints.       Objective:     Vital Signs (Most Recent):  Temp: 99.3 °F (37.4 °C) (01/06/24 1942)  Pulse: 69 (01/06/24 1942)  Resp: 18 (01/06/24 2044)  BP: (!) 151/70 (01/06/24 1942)  SpO2: 99 % (01/06/24 1942) Vital Signs (24h Range):  Temp:  [97.8 °F (36.6 °C)-99.3 °F (37.4 °C)] 99.3 °F (37.4 °C)  Pulse:  [55-69] 69  Resp:  [14-20] 18  SpO2:  [96 %-100 %] 99 %  BP: (117-151)/(57-70) 151/70     Weight: 72.1 kg (159 lb)  Body mass index is 25.66 kg/m².    Intake/Output Summary (Last 24 hours) at 1/6/2024 2128  Last data filed at 1/6/2024 0433  Gross per 24 hour   Intake 120 ml   Output 200 ml   Net -80 ml         Physical Exam  Vitals and nursing note reviewed.   Constitutional:       General: She is not in acute distress.     Appearance: She is ill-appearing (chronically). She is not toxic-appearing or diaphoretic.   HENT:      Head: Normocephalic and atraumatic.      Nose: No congestion.      Mouth/Throat:      Mouth: Mucous membranes are dry.   Eyes:      General: No scleral icterus.     Conjunctiva/sclera: Conjunctivae normal.   Cardiovascular:      Rate and Rhythm: Normal rate.      Heart sounds: Normal heart sounds. No murmur heard.  Pulmonary:      Effort: Pulmonary effort is normal. No respiratory distress.      Breath sounds: Normal breath sounds.   Abdominal:      General: Bowel sounds are normal. There is no distension.      Palpations: Abdomen is soft.      Tenderness: There is no abdominal tenderness.   Musculoskeletal:         General: No swelling or tenderness.      Cervical back: No rigidity or tenderness.      Right lower leg: No edema.      Left lower leg: No edema.   Skin:     General: Skin is warm and dry.      Findings: No erythema or lesion.   Neurological:      General: No focal deficit present.      Mental Status: She is alert. Mental status is at baseline.   Psychiatric:         Mood and Affect: Mood normal.         Thought Content: Thought content  normal.             Significant Labs: All pertinent labs within the past 24 hours have been reviewed.    Significant Imaging: I have reviewed all pertinent imaging results/findings within the past 24 hours.

## 2024-01-07 NOTE — PROGRESS NOTES
"[facility] Surg ([address] Medicine  Progress Note    Patient Name: Patito Domingo  MRN: [id number]  Patient Class: OP- Observation   Admission Date: 1/3/2024  Length of Stay: 0 days  Attending Physician: Katherine Solis DO  Primary Care Provider: Mariela Wei DO        Subjective:     Principal Problem:FLORECITA (acute kidney injury)        HPI:  Patito Domingo is a 73 y.o. female with metastatic carcinoid tumor, history of CVA with debility, nephrolithiasis with history of ESBL/VRE UTI, anemia, diastolic CHF, chronic pain who presents today from [facility] for refusal to eat or drink.     Patient states that she "is a little confused" but endorses lower-mid abdominal pain of a sharp nature that has been present for about a day or so. She also endorses dysuria. Denies n/v, fever, or diarrhea, however chart review shows that she has chronic diarrhea. She can only tell me that she takes Oxycodone, which helps with the pain. She endorses chronic back pain which has not changed recently.     Per the ED on discussion with her nursing home, she was sent her for refusal to eat or drink for the past couple of days, which is new for her.     Overview/Hospital Course:  Patient presented with refusal to eat, UA suggestive of UTI. Ertapenem started. Discussed case with son. Patient eating better (75% breakfast, 25% lunch). Continue curretn management. FLORECITA resolved. ID recommendations appreciated. CM currently working on placement. Patient is medically ready to be discharged.     01/06: pleasant, answering questions appropriately. Denies any complaints. Awaiting placement, likely Monday per CM. Appreciate assistance.     Interval History: pleasant, denies any complaints.       Objective:     Vital Signs (Most Recent):  Temp: 99.3 °F (37.4 °C) (01/06/24 1942)  Pulse: 69 (01/06/24 1942)  Resp: 18 (01/06/24 2044)  BP: (!) 151/70 (01/06/24 1942)  SpO2: 99 % (01/06/24 1942) Vital Signs (24h " Range):  Temp:  [97.8 °F (36.6 °C)-99.3 °F (37.4 °C)] 99.3 °F (37.4 °C)  Pulse:  [55-69] 69  Resp:  [14-20] 18  SpO2:  [96 %-100 %] 99 %  BP: (117-151)/(57-70) 151/70     Weight: 72.1 kg (159 lb)  Body mass index is 25.66 kg/m².    Intake/Output Summary (Last 24 hours) at 1/6/2024 2128  Last data filed at 1/6/2024 0433  Gross per 24 hour   Intake 120 ml   Output 200 ml   Net -80 ml         Physical Exam  Vitals and nursing note reviewed.   Constitutional:       General: She is not in acute distress.     Appearance: She is ill-appearing (chronically). She is not toxic-appearing or diaphoretic.   HENT:      Head: Normocephalic and atraumatic.      Nose: No congestion.      Mouth/Throat:      Mouth: Mucous membranes are dry.   Eyes:      General: No scleral icterus.     Conjunctiva/sclera: Conjunctivae normal.   Cardiovascular:      Rate and Rhythm: Normal rate.      Heart sounds: Normal heart sounds. No murmur heard.  Pulmonary:      Effort: Pulmonary effort is normal. No respiratory distress.      Breath sounds: Normal breath sounds.   Abdominal:      General: Bowel sounds are normal. There is no distension.      Palpations: Abdomen is soft.      Tenderness: There is no abdominal tenderness.   Musculoskeletal:         General: No swelling or tenderness.      Cervical back: No rigidity or tenderness.      Right lower leg: No edema.      Left lower leg: No edema.   Skin:     General: Skin is warm and dry.      Findings: No erythema or lesion.   Neurological:      General: No focal deficit present.      Mental Status: She is alert. Mental status is at baseline.   Psychiatric:         Mood and Affect: Mood normal.         Thought Content: Thought content normal.             Significant Labs: All pertinent labs within the past 24 hours have been reviewed.    Significant Imaging: I have reviewed all pertinent imaging results/findings within the past 24 hours.    Assessment/Plan:      * FLORECITA (acute kidney injury)  Cr 1.1 on  presentation, and baseline appears to be around 0.1. Suspect FLORECITA due to volume depletion from poor p.o. intake along with chronic ARB use.  We will continue IV fluid resuscitation while monitoring on subsequent labs.  Hold home losartan. Avoid nephrotoxic agents as able, and renally dose all meds as applicable.      Acute cystitis without hematuria  Urinalysis shows greater than 100 white blood cells with no epithelial cells and many bacteria.  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 She does endorse lower abdominal pain and dysuria.  She is associated mild leukocytosis, however this may be secondary to hemoconcentration.  She was given ertapenem and linezolid in the ED; we will consult Infectious Disease to assist with any further antibiotics given her history of MDRO.  ID recs to start with cipro 500 mg BID    Abnormal CT scan, lumbar spine  CT abdomen and pelvis shows compression deformity of the anterior superior aspect of L5, which is new since prior CT in 09/2023.  Patient reports chronic back pain, however is not fully oriented.  May consider inpatient neurosurgery consultation pending her course.      Other constipation  Severe constipation noted on abdominal CT, likely contributing to her abdominal pain and possible UTI.  This is secondary to debility and chronic opioid use.  We will start a bowel regimen and give an enema.      Chronic diastolic (congestive) heart failure  No evidence of acute exacerbation, however more likely with volume depletion.  We will continue IV fluids while monitoring volume status.    Moderate protein-calorie malnutrition  Likely due to metastatic carcinoid with other chronic comorbidiites. Around 20-lb weight loss noted since last summer per charted weights. May benefit from dietary supplements.     Elevated troponin  Troponin elevated without EKG changes, and no complaints of chest pain.  Suspect this is due  to renal insufficiency and volume depletion.  Continue home beta-blocker.      Anemia of chronic disease  Appears higher than recent baseline, however likely somewhat due to hemoconcentration. No indication for transfusion. Monitor.     Nephrolithiasis  Chronic, and noted on CT.  No acute issues.  Monitor.      Chronic pain syndrome  Cautiously continue home pain regimen in the setting of altered mental status and constipation.       Debility  Due to multiple comorbidities and history of CVA. Continue PT/OT upon discharge back to facility.     Metastatic carcinoid tumor  Known metastatic carcinoid with progressive mets on CT today. Likely contributing to her chronic debility. Continue outpatient follow up with Heme Onc.         VTE Risk Mitigation (From admission, onward)           Ordered     enoxaparin injection 40 mg  Every 24 hours         01/03/24 2007     IP VTE HIGH RISK PATIENT  Once         01/03/24 2007     Place sequential compression device  Until discontinued         01/03/24 2007                    Discharge Planning   JULIO C: 1/5/2024     Code Status: Full Code   Is the patient medically ready for discharge?: Yes    Reason for patient still in hospital (select all that apply): Pending disposition  Discharge Plan A: Return to nursing home                  Katherine Solis DO  Department of Hospital Medicine   [facility] - Med Surg ([place]

## 2024-01-07 NOTE — PLAN OF CARE
AAOX4. Slightly hypertensive. PRN PO pain medication given - Pt. complains of dysuria and back pain. PO ABX given. No adverse events noted during this shift.    Problem: Infection  Goal: Absence of Infection Signs and Symptoms  Outcome: Ongoing, Progressing  Intervention: Prevent or Manage Infection  Flowsheets (Taken 1/7/2024 0749)  Isolation Precautions:   contact   precautions maintained     Problem: Adult Inpatient Plan of Care  Goal: Plan of Care Review  Outcome: Ongoing, Progressing  Flowsheets (Taken 1/7/2024 0749)  Plan of Care Reviewed With: patient  Goal: Patient-Specific Goal (Individualized)  Outcome: Ongoing, Progressing  Goal: Absence of Hospital-Acquired Illness or Injury  Outcome: Ongoing, Progressing  Intervention: Identify and Manage Fall Risk  Flowsheets (Taken 1/7/2024 0749)  Safety Promotion/Fall Prevention:   assistive device/personal item within reach   Fall Risk reviewed with patient/family   medications reviewed   nonskid shoes/socks when out of bed   room near unit station   side rails raised x 3   instructed to call staff for mobility  Goal: Optimal Comfort and Wellbeing  Outcome: Ongoing, Progressing  Goal: Readiness for Transition of Care  Outcome: Ongoing, Progressing     Problem: Adjustment to Illness (Sepsis/Septic Shock)  Goal: Optimal Coping  Outcome: Ongoing, Progressing  Intervention: Optimize Psychosocial Adjustment to Illness  Flowsheets (Taken 1/7/2024 0749)  Supportive Measures:   active listening utilized   self-care encouraged   verbalization of feelings encouraged     Problem: Bleeding (Sepsis/Septic Shock)  Goal: Absence of Bleeding  Outcome: Ongoing, Progressing     Problem: Glycemic Control Impaired (Sepsis/Septic Shock)  Goal: Blood Glucose Level Within Desired Range  Outcome: Ongoing, Progressing     Problem: Infection Progression (Sepsis/Septic Shock)  Goal: Absence of Infection Signs and Symptoms  Outcome: Ongoing, Progressing  Intervention: Promote  Recovery  Flowsheets (Taken 1/7/2024 0749)  Activity Management:   Rolling - L1   Arm raise - L1   Heel slide - L1     Problem: Nutrition Impaired (Sepsis/Septic Shock)  Goal: Optimal Nutrition Intake  Outcome: Ongoing, Progressing     Problem: Fluid and Electrolyte Imbalance (Acute Kidney Injury/Impairment)  Goal: Fluid and Electrolyte Balance  Outcome: Ongoing, Progressing  Intervention: Monitor and Manage Fluid and Electrolyte Balance  Flowsheets (Taken 1/7/2024 0749)  Fluid/Electrolyte Management: fluids provided     Problem: Oral Intake Inadequate (Acute Kidney Injury/Impairment)  Goal: Optimal Nutrition Intake  Outcome: Ongoing, Progressing     Problem: Renal Function Impairment (Acute Kidney Injury/Impairment)  Goal: Effective Renal Function  Outcome: Ongoing, Progressing     Problem: Skin Injury Risk Increased  Goal: Skin Health and Integrity  Outcome: Ongoing, Progressing  Intervention: Optimize Skin Protection  Flowsheets (Taken 1/7/2024 0749)  Pressure Reduction Techniques:   frequent weight shift encouraged   weight shift assistance provided  Pressure Reduction Devices: positioning supports utilized  Skin Protection:   skin sealant/moisture barrier applied   transparent dressing maintained   tubing/devices free from skin contact   adhesive use limited   incontinence pads utilized  Head of Bed (HOB) Positioning: HOB at 20-30 degrees     Problem: Impaired Wound Healing  Goal: Optimal Wound Healing  Outcome: Ongoing, Progressing  Intervention: Promote Wound Healing  Flowsheets (Taken 1/7/2024 0749)  Activity Management:   Rolling - L1   Arm raise - L1   Heel slide - L1     Problem: Fall Injury Risk  Goal: Absence of Fall and Fall-Related Injury  Outcome: Ongoing, Progressing  Intervention: Identify and Manage Contributors  Flowsheets (Taken 1/7/2024 0749)  Self-Care Promotion:   independence encouraged   BADL personal objects within reach  Medication Review/Management: medications reviewed  Intervention:  Promote Injury-Free Environment  Flowsheets (Taken 1/7/2024 2748)  Safety Promotion/Fall Prevention:   assistive device/personal item within reach   Fall Risk reviewed with patient/family   medications reviewed   nonskid shoes/socks when out of bed   room near unit station   side rails raised x 3   instructed to call staff for mobility

## 2024-01-07 NOTE — ASSESSMENT & PLAN NOTE
CT abdomen and pelvis shows compression deformity of the anterior superior aspect of L5, which is new since prior CT in 09/2023.  -LS brace  -outpt spine surgery f/u

## 2024-01-07 NOTE — PLAN OF CARE
[facility] - Med S[facility] ([facility])  Discharge Reassessment    Primary Care Provider: Mariela Wei DO    Expected Discharge Date: 1/5/2024    Reassessment (most recent)       Discharge Reassessment - 01/07/24 1107          Discharge Reassessment    Assessment Type Discharge Planning Reassessment (P)      Did the patient's condition or plan change since previous assessment? No (P)      Discharge Plan discussed with: Adult children (P)      Communicated JULIO C with patient/caregiver Yes (P)      Discharge Plan A Return to nursing home (P)    Return to SNF ([facility])    Discharge Plan B Skilled Nursing Facility (P)    New SNF Placement       Post-Acute Status    Post-Acute Authorization Placement (P)      Post-Acute Placement Status Set-up Complete/Auth obtained (P)      Coverage [employer] MANAGED MEDICARE - [employer] [facility] HMO PPO SPECIAL NEEDS - CAPITATED (P)                  Discharge Plan A and Plan B have been determined by review of patient's clinical status, future medical and therapeutic needs, and coverage/benefits for post-acute care in coordination with multidisciplinary team members.                         SW reviewed Availity for status update on SNF auth. Auth has been approved for SNF LOC.    SW reviewed dc plan w/ patient's Son, Dallas. SW informed Son that SNF auth approved, asked what facility patient/family has decided on. Per dallas, family has not yet decided on if patient will return to [facility] or admit to a new SNF. SW reviewed facility options w/ patient ([facility], [facility], [facility] are accepting SNFs). SW advised Son that patient is medically ready for dc at this time. Son states that patient/family has not decided on a facility and plans to tour facility prior to making decision. Son states that family has appt to tour facilities on tomorrow am Monday 1/8, unable to tour during weekend. SW explained next steps of placement process in detail w/ Son and Son verbalized  understanding. Patient's dc pending family deciding on SNF, will plan for Monday 1/8 dc.                          LISETH Hui, LMSW  [facility]  Case Management  Ext. [phone]

## 2024-01-07 NOTE — PROGRESS NOTES
"[facility] Surg ([address] Medicine  Progress Note    Patient Name: Patito Domingo  MRN: [id number]  Patient Class: OP- Observation   Admission Date: 1/3/2024  Length of Stay: 0 days  Attending Physician: Norman Juan MD  Primary Care Provider: Mariela Wei DO        Subjective:     Principal Problem:FLORECITA (acute kidney injury)        HPI:  Patito Domingo is a 73 y.o. female with metastatic carcinoid tumor, history of CVA with debility, nephrolithiasis with history of ESBL/VRE UTI, anemia, diastolic CHF, chronic pain who presents today from [facility] for refusal to eat or drink.     Patient states that she "is a little confused" but endorses lower-mid abdominal pain of a sharp nature that has been present for about a day or so. She also endorses dysuria. Denies n/v, fever, or diarrhea, however chart review shows that she has chronic diarrhea. She can only tell me that she takes Oxycodone, which helps with the pain. She endorses chronic back pain which has not changed recently.     Per the ED on discussion with her nursing home, she was sent her for refusal to eat or drink for the past couple of days, which is new for her.     Overview/Hospital Course:  Patient presented with refusal to eat, UA suggestive of UTI. Ertapenem started. Discussed case with son. Patient eating better (75% breakfast, 25% lunch). Continue curretn management. FLORECITA resolved. ID recommendations appreciated. CM currently working on placement.        Interval History:  Patient reports buttocks soreness secondary to likely contact dermatitis/rash from bowel movements.  Patient asking for staff to take her to the commode and set up having a bowel movement in the bed.  No chest pain or shortness a breath today.  Tolerating p.o. intake.  Nurse reports the bowel movements are soft.    Review of Systems  Objective:     Vital Signs (Most Recent):  Temp: 98.3 °F (36.8 °C) (01/07/24 1200)  Pulse: 69 " (01/07/24 1200)  Resp: 17 (01/07/24 1200)  BP: 137/63 (01/07/24 1200)  SpO2: 99 % (01/07/24 1200) Vital Signs (24h Range):  Temp:  [98.1 °F (36.7 °C)-99.3 °F (37.4 °C)] 98.3 °F (36.8 °C)  Pulse:  [64-81] 69  Resp:  [16-20] 17  SpO2:  [97 %-99 %] 99 %  BP: (132-151)/(62-70) 137/63     Weight: 72.1 kg (159 lb)  Body mass index is 25.66 kg/m².    Intake/Output Summary (Last 24 hours) at 1/7/2024 1629  Last data filed at 1/7/2024 1406  Gross per 24 hour   Intake 840 ml   Output 250 ml   Net 590 ml         Physical Exam      Clear lungs bilaterally, unlabored breathing, on room air, no cyanosis   Heart sounds indicate a regular rate and rhythm  Awake alert, no acute distress  No facial, no slurred speech   Abdomen nondistended   No obvious lower extremity edema     Assessment/Plan:      * FLORECITA (acute kidney injury)  likely 2/2 dehydration  Resolved;       Acute cystitis without hematuria  Ucx showing pansesntivie c.rajan.   Cipro per ID.     Abnormal CT scan, lumbar spine  CT abdomen and pelvis shows compression deformity of the anterior superior aspect of L5, which is new since prior CT in 09/2023.  -LS brace  -outpt spine surgery f/u      Other constipation  Severe constipation noted on abdominal CT, likely contributing to her abdominal pain and possible UTI.  This is secondary to debility and chronic opioid use.  We will start a bowel regimen and give an enema.      Chronic diastolic (congestive) heart failure  Stable  Resume home losartan    Elevated troponin  Troponin elevated without EKG changes, and no complaints of chest pain.  Suspect this is due to renal insufficiency and volume depletion.  Continue home beta-blocker.      Nephrolithiasis  Chronic, and noted on CT.  No acute issues.  Monitor.      Chronic pain syndrome  Cautiously continue home pain regimen in the setting of altered mental status and constipation.       Debility  Due to multiple comorbidities and history of CVA. Continue PT/OT upon discharge back  to facility.     Metastatic carcinoid tumor  Known metastatic carcinoid with progressive mets on CT today. Likely contributing to her chronic debility. Continue outpatient follow up with Heme Onc.         VTE Risk Mitigation (From admission, onward)           Ordered     enoxaparin injection 40 mg  Every 24 hours         01/03/24 2007     IP VTE HIGH RISK PATIENT  Once         01/03/24 2007     Place sequential compression device  Until discontinued         01/03/24 2007                    Discharge Planning   JULIO C: 1/8/2024     Code Status: Full Code   Is the patient medically ready for discharge?: Yes    Reason for patient still in hospital (select all that apply): Pending disposition  Discharge Plan A: Return to nursing home (Return to SNF ([facility]))                  Norman Juan MD  Department of Hospital Medicine   [facility] Surg ([place]-[facility])

## 2024-01-08 PROBLEM — L24.A2 IRRITANT CONTACT DERMATITIS DUE TO FECAL, URINARY OR DUAL INCONTINENCE: Status: ACTIVE | Noted: 2024-01-08

## 2024-01-08 PROBLEM — L30.9 DERMATITIS: Status: ACTIVE | Noted: 2024-01-08

## 2024-01-08 LAB
ALBUMIN SERPL BCP-MCNC: 2 G/DL (ref 3.5–5.2)
ALP SERPL-CCNC: 156 U/L (ref 55–135)
ALT SERPL W/O P-5'-P-CCNC: 9 U/L (ref 10–44)
ANION GAP SERPL CALC-SCNC: 9 MMOL/L (ref 8–16)
AST SERPL-CCNC: 16 U/L (ref 10–40)
BACTERIA BLD CULT: NORMAL
BACTERIA BLD CULT: NORMAL
BILIRUB SERPL-MCNC: 0.3 MG/DL (ref 0.1–1)
BUN SERPL-MCNC: 17 MG/DL (ref 8–23)
CALCIUM SERPL-MCNC: 8.7 MG/DL (ref 8.7–10.5)
CHLORIDE SERPL-SCNC: 110 MMOL/L (ref 95–110)
CO2 SERPL-SCNC: 22 MMOL/L (ref 23–29)
CREAT SERPL-MCNC: 0.7 MG/DL (ref 0.5–1.4)
ERYTHROCYTE [DISTWIDTH] IN BLOOD BY AUTOMATED COUNT: 20 % (ref 11.5–14.5)
EST. GFR  (NO RACE VARIABLE): >60 ML/MIN/1.73 M^2
GLUCOSE SERPL-MCNC: 75 MG/DL (ref 70–110)
HCT VFR BLD AUTO: 27.6 % (ref 37–48.5)
HGB BLD-MCNC: 8.1 G/DL (ref 12–16)
HGB BLD-MCNC: 8.4 G/DL (ref 12–16)
MCH RBC QN AUTO: 24 PG (ref 27–31)
MCHC RBC AUTO-ENTMCNC: 29.3 G/DL (ref 32–36)
MCV RBC AUTO: 82 FL (ref 82–98)
PLATELET # BLD AUTO: 242 K/UL (ref 150–450)
PMV BLD AUTO: 11.6 FL (ref 9.2–12.9)
POCT GLUCOSE: 98 MG/DL (ref 70–110)
POTASSIUM SERPL-SCNC: 3.9 MMOL/L (ref 3.5–5.1)
PROT SERPL-MCNC: 6.2 G/DL (ref 6–8.4)
RBC # BLD AUTO: 3.38 M/UL (ref 4–5.4)
SODIUM SERPL-SCNC: 141 MMOL/L (ref 136–145)
WBC # BLD AUTO: 7.21 K/UL (ref 3.9–12.7)

## 2024-01-08 PROCEDURE — 25000003 PHARM REV CODE 250: Mod: HCNC | Performed by: HOSPITALIST

## 2024-01-08 PROCEDURE — 25000003 PHARM REV CODE 250: Mod: HCNC | Performed by: STUDENT IN AN ORGANIZED HEALTH CARE EDUCATION/TRAINING PROGRAM

## 2024-01-08 PROCEDURE — 80053 COMPREHEN METABOLIC PANEL: CPT | Mod: HCNC | Performed by: STUDENT IN AN ORGANIZED HEALTH CARE EDUCATION/TRAINING PROGRAM

## 2024-01-08 PROCEDURE — 85018 HEMOGLOBIN: CPT | Mod: HCNC | Performed by: HOSPITALIST

## 2024-01-08 PROCEDURE — 99222 1ST HOSP IP/OBS MODERATE 55: CPT | Mod: HCNC,,, | Performed by: NURSE PRACTITIONER

## 2024-01-08 PROCEDURE — 85027 COMPLETE CBC AUTOMATED: CPT | Mod: HCNC | Performed by: STUDENT IN AN ORGANIZED HEALTH CARE EDUCATION/TRAINING PROGRAM

## 2024-01-08 PROCEDURE — 36415 COLL VENOUS BLD VENIPUNCTURE: CPT | Mod: HCNC | Performed by: HOSPITALIST

## 2024-01-08 PROCEDURE — G0378 HOSPITAL OBSERVATION PER HR: HCPCS | Mod: HCNC

## 2024-01-08 PROCEDURE — 96372 THER/PROPH/DIAG INJ SC/IM: CPT | Performed by: STUDENT IN AN ORGANIZED HEALTH CARE EDUCATION/TRAINING PROGRAM

## 2024-01-08 PROCEDURE — 63600175 PHARM REV CODE 636 W HCPCS: Mod: HCNC | Performed by: STUDENT IN AN ORGANIZED HEALTH CARE EDUCATION/TRAINING PROGRAM

## 2024-01-08 PROCEDURE — 94761 N-INVAS EAR/PLS OXIMETRY MLT: CPT | Mod: HCNC

## 2024-01-08 RX ADMIN — METOPROLOL TARTRATE 50 MG: 50 TABLET, FILM COATED ORAL at 08:01

## 2024-01-08 RX ADMIN — CIPROFLOXACIN 750 MG: 750 TABLET, FILM COATED ORAL at 12:01

## 2024-01-08 RX ADMIN — LIDOCAINE 5% 1 PATCH: 700 PATCH TOPICAL at 08:01

## 2024-01-08 RX ADMIN — ENOXAPARIN SODIUM 40 MG: 40 INJECTION SUBCUTANEOUS at 06:01

## 2024-01-08 RX ADMIN — OXYCODONE HYDROCHLORIDE 15 MG: 10 TABLET ORAL at 02:01

## 2024-01-08 RX ADMIN — OXYCODONE HYDROCHLORIDE 15 MG: 10 TABLET ORAL at 10:01

## 2024-01-08 RX ADMIN — POLYETHYLENE GLYCOL 3350 17 G: 17 POWDER, FOR SOLUTION ORAL at 08:01

## 2024-01-08 RX ADMIN — OXYCODONE HYDROCHLORIDE 15 MG: 10 TABLET ORAL at 04:01

## 2024-01-08 RX ADMIN — METOPROLOL TARTRATE 50 MG: 50 TABLET, FILM COATED ORAL at 10:01

## 2024-01-08 RX ADMIN — CIPROFLOXACIN 750 MG: 750 TABLET, FILM COATED ORAL at 10:01

## 2024-01-08 RX ADMIN — Medication 1 CAPSULE: at 08:01

## 2024-01-08 RX ADMIN — LOSARTAN POTASSIUM 25 MG: 25 TABLET, FILM COATED ORAL at 08:01

## 2024-01-08 NOTE — HPI
"Patito Domingo is a 73 year old female with metastatic carcinoid tumor, history of CVA with debility, nephrolithiasis with history of ESBL/VRE UTI, anemia, diastolic CHF, chronic pain who presents today from [facility] for refusal to eat or drink.      Patient states that she "is a little confused" but endorses lower-mid abdominal pain of a sharp nature that has been present for about a day or so. She also endorses dysuria. Denies n/v, fever, or diarrhea, however chart review shows that she has chronic diarrhea. She can only tell me that she takes Oxycodone, which helps with the pain. She endorses chronic back pain which has not changed recently.      Per the ED on discussion with her nursing home, she was sent her for refusal to eat or drink for the past couple of days, which is new for her. Patient is admitted to hospital medicine service for further management. Skin integrity VALDEZ consulted for evaluation of skin injury.  "

## 2024-01-08 NOTE — ASSESSMENT & PLAN NOTE
CT abdomen and pelvis shows compression deformity of the anterior superior aspect of L5 at least 6 weeks old per care everywhere  -LS brace  -outpt spine surgery f/u

## 2024-01-08 NOTE — PLAN OF CARE
[facility] scheduled pcp   Future Appointments   Date Time Provider Department Center   1/23/2024 11:20 AM Mariela Wei DO [facility] [place]   2/1/2024  8:00 AM Kristen Ferrer NP [address]     put patient on waiting list

## 2024-01-08 NOTE — SUBJECTIVE & OBJECTIVE
Scheduled Meds:   ciprofloxacin HCl  750 mg Oral Q12H    enoxparin  40 mg Subcutaneous Q24H (prophylaxis, 1700)    Lactobacillus rhamnosus GG  1 capsule Oral Daily    LIDOcaine  1 patch Transdermal Q24H    losartan  25 mg Oral Daily    metoprolol tartrate  50 mg Oral BID    polyethylene glycol  17 g Oral BID     Continuous Infusions:  PRN Meds:acetaminophen, acetaminophen, aluminum-magnesium hydroxide-simethicone, melatonin, naloxone, oxyCODONE, polyethylene glycol, simethicone, sodium chloride 0.9%    Review of patient's allergies indicates:   Allergen Reactions    Contrast media Hives, Itching and Swelling    Epinephrine Anaphylaxis     Can cause  a Carcinoid Crisis    Ibuprofen Hives, Itching and Swelling    Zofran [ondansetron hcl] Itching     And multiple other reactions    Iodinated contrast media     Morphine Other (See Comments)    Sulfa (sulfonamide antibiotics) Hives, Itching and Swelling        Past Medical History:   Diagnosis Date    Anemia of chronic disease     Also with iron deficiency    Bacteremia due to Klebsiella pneumoniae     Cataract     Chronic diastolic (congestive) heart failure     Colon cancer     Encounter for blood transfusion     History of ESBL E. 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)      Past Surgical History:   Procedure Laterality Date    ABDOMINAL SURGERY      CATARACT EXTRACTION Left 10/2017     SECTION      CHOLECYSTECTOMY      COLON SURGERY      cystoscope      CYSTOSCOPY W/  RETROGRADES Right 10/10/2019    Procedure: CYSTOSCOPY, WITH RETROGRADE PYELOGRAM;  Surgeon: Gen Isbell MD;  Location: [facility] OR;  Service: Urology;  Laterality: Right;    ERCP N/A 11/24/2021    Procedure: ERCP (ENDOSCOPIC RETROGRADE CHOLANGIOPANCREATOGRAPHY);  Surgeon: Jayjay Rivera MD;  Location: [facility] ENDO (2ND FLR);  Service: Endoscopy;  Laterality: N/A;    ERCP N/A 3/4/2022    Procedure: ERCP (ENDOSCOPIC RETROGRADE CHOLANGIOPANCREATOGRAPHY);  Surgeon: Roby Virgen MD;  Location: [facility] ENDO (2ND FLR);  Service: Endoscopy;  Laterality: N/A;    EYE SURGERY      HYSTERECTOMY  5/1996    LITHOTRIPSY      LIVER BIOPSY  9/14    carcinoid    URETEROSCOPY Right 10/10/2019    Procedure: URETEROSCOPY;  Surgeon: Gen Isbell MD;  Location: [facility] OR;  Service: Urology;  Laterality: Right;    UTERINE FIBROID SURGERY         Family History       Problem Relation (Age of Onset)    Alzheimer's disease Father    Cancer Mother    No Known Problems Son, Son, Son, Son    Stroke Sister          Tobacco Use    Smoking status: Never    Smokeless tobacco: Never   Substance and Sexual Activity    Alcohol use: No     Alcohol/week: 0.0 standard drinks of alcohol    Drug use: No    Sexual activity: Not Currently     Review of Systems   Skin:  Positive for wound.       Objective:     Vital Signs (Most Recent):  Temp: 98 °F (36.7 °C) (01/08/24 0830)  Pulse: 66 (01/08/24 1110)  Resp: 18 (01/08/24 1407)  BP: (!) 156/70 (01/08/24 0830)  SpO2: 95 % (01/08/24 0830) Vital Signs (24h Range):  Temp:  [97.2 °F (36.2 °C)-98.5 °F (36.9 °C)] 98 °F (36.7 °C)  Pulse:  [59-79] 66  Resp:  [18-19] 18  SpO2:  [95 %-98 %] 95 %  BP: (117-167)/(57-73) 156/70     Weight: 72.1 kg (159 lb)  Body mass index is 25.66 kg/m².     Physical Exam  Constitutional:       Appearance: Normal appearance.   Skin:     General: Skin is warm and dry.      Findings: Lesion present.   Neurological:      Mental Status: She is alert.          Laboratory:  All pertinent labs  reviewed within the last 24 hours.    Diagnostic Results:  None

## 2024-01-08 NOTE — SUBJECTIVE & OBJECTIVE
Interval History:  Patient is still reports having some buttock soreness related to bowel movements.  Otherwise no chest pain, no shortness a breath.  Afebrile.    Review of Systems  Objective:     Vital Signs (Most Recent):  Temp: 98.3 °F (36.8 °C) (01/08/24 1130)  Pulse: 78 (01/08/24 1458)  Resp: 18 (01/08/24 1407)  BP: (!) 145/69 (01/08/24 1130)  SpO2: 98 % (01/08/24 1130) Vital Signs (24h Range):  Temp:  [97.2 °F (36.2 °C)-98.5 °F (36.9 °C)] 98.3 °F (36.8 °C)  Pulse:  [59-79] 78  Resp:  [18-19] 18  SpO2:  [95 %-98 %] 98 %  BP: (117-167)/(57-73) 145/69     Weight: 72.1 kg (159 lb)  Body mass index is 25.66 kg/m².    Intake/Output Summary (Last 24 hours) at 1/8/2024 1542  Last data filed at 1/8/2024 0630  Gross per 24 hour   Intake 300 ml   Output --   Net 300 ml         Physical Exam      Clear lungs bilaterally, unlabored breathing, on room air, no cyanosis   Heart sounds indicate a regular rate and rhythm  Awake alert, mild distress secondary to buttocks pain   No obvious lower extremity edema

## 2024-01-08 NOTE — CONSULTS
"[facility] - [facility] S[facility] ([place] [address])  Skin Integrity VALDEZ  Consult Note    Patient Name: Patito Domingo  MRN: [id number]  Admission Date: 1/3/2024  Hospital Length of Stay: 0 days  Attending Physician: Norman Juan MD  Primary Care Provider: Mariela Wei DO     Inpatient consult to Skin Integrity  Practitioner  Consult performed by: Filomena Mccollum NP  Consult ordered by: Katherine Solis DO        Subjective:     History of Present Illness:  Patito Domingo is a 73 year old female with metastatic carcinoid tumor, history of CVA with debility, nephrolithiasis with history of ESBL/VRE UTI, anemia, diastolic CHF, chronic pain who presents today from [facility] for refusal to eat or drink.      Patient states that she "is a little confused" but endorses lower-mid abdominal pain of a sharp nature that has been present for about a day or so. She also endorses dysuria. Denies n/v, fever, or diarrhea, however chart review shows that she has chronic diarrhea. She can only tell me that she takes Oxycodone, which helps with the pain. She endorses chronic back pain which has not changed recently.      Per the ED on discussion with her nursing home, she was sent her for refusal to eat or drink for the past couple of days, which is new for her. Patient is admitted to hospital medicine service for further management. Skin integrity VALDEZ consulted for evaluation of skin injury.    Scheduled Meds:   ciprofloxacin HCl  750 mg Oral Q12H    enoxparin  40 mg Subcutaneous Q24H (prophylaxis, 1700)    Lactobacillus rhamnosus GG  1 capsule Oral Daily    LIDOcaine  1 patch Transdermal Q24H    losartan  25 mg Oral Daily    metoprolol tartrate  50 mg Oral BID    polyethylene glycol  17 g Oral BID     Continuous Infusions:  PRN Meds:acetaminophen, acetaminophen, aluminum-magnesium hydroxide-simethicone, melatonin, naloxone, oxyCODONE, polyethylene glycol, simethicone, sodium chloride 0.9%    Review of patient's " allergies indicates:   Allergen Reactions    Contrast media Hives, Itching and Swelling    Epinephrine Anaphylaxis     Can cause  a Carcinoid Crisis    Ibuprofen Hives, Itching and Swelling    Zofran [ondansetron hcl] Itching     And multiple other reactions    Iodinated contrast media     Morphine Other (See Comments)    Sulfa (sulfonamide antibiotics) Hives, Itching and Swelling        Past Medical History:   Diagnosis Date    Anemia of chronic disease     Also with iron deficiency    Bacteremia due to Klebsiella pneumoniae     Cataract     Chronic diastolic (congestive) heart failure     Colon cancer     Encounter for blood transfusion     History of ESBL E. 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)      Past Surgical History:   Procedure Laterality Date    ABDOMINAL SURGERY      CATARACT EXTRACTION Left 10/2017     SECTION      CHOLECYSTECTOMY      COLON SURGERY      cystoscope      CYSTOSCOPY W/ RETROGRADES Right 10/10/2019    Procedure: CYSTOSCOPY, WITH RETROGRADE PYELOGRAM;  Surgeon: Gen Isbell MD;  Location: [facility];  Service: Urology;  Laterality: Right;    ERCP N/A 2021    Procedure: ERCP (ENDOSCOPIC RETROGRADE CHOLANGIOPANCREATOGRAPHY);  Surgeon: Jayjay Rivera MD;  Location: [address]);  Service: Endoscopy;  Laterality: N/A;    ERCP N/A 3/4/2022    Procedure: ERCP (ENDOSCOPIC RETROGRADE CHOLANGIOPANCREATOGRAPHY);  Surgeon: Roby Virgen MD;  Location: [address]);  Service:  Endoscopy;  Laterality: N/A;    EYE SURGERY      HYSTERECTOMY  5/1996    LITHOTRIPSY      LIVER BIOPSY  9/14    carcinoid    URETEROSCOPY Right 10/10/2019    Procedure: URETEROSCOPY;  Surgeon: Gen Isbell MD;  Location: [facility] OR;  Service: Urology;  Laterality: Right;    UTERINE FIBROID SURGERY         Family History       Problem Relation (Age of Onset)    Alzheimer's disease Father    Cancer Mother    No Known Problems Son, Son, Son, Son    Stroke Sister          Tobacco Use    Smoking status: Never    Smokeless tobacco: Never   Substance and Sexual Activity    Alcohol use: No     Alcohol/week: 0.0 standard drinks of alcohol    Drug use: No    Sexual activity: Not Currently     Review of Systems   Skin:  Positive for wound.       Objective:     Vital Signs (Most Recent):  Temp: 98 °F (36.7 °C) (01/08/24 0830)  Pulse: 66 (01/08/24 1110)  Resp: 18 (01/08/24 1407)  BP: (!) 156/70 (01/08/24 0830)  SpO2: 95 % (01/08/24 0830) Vital Signs (24h Range):  Temp:  [97.2 °F (36.2 °C)-98.5 °F (36.9 °C)] 98 °F (36.7 °C)  Pulse:  [59-79] 66  Resp:  [18-19] 18  SpO2:  [95 %-98 %] 95 %  BP: (117-167)/(57-73) 156/70     Weight: 72.1 kg (159 lb)  Body mass index is 25.66 kg/m².     Physical Exam  Constitutional:       Appearance: Normal appearance.   Skin:     General: Skin is warm and dry.      Findings: Lesion present.   Neurological:      Mental Status: She is alert.          Laboratory:  All pertinent labs reviewed within the last 24 hours.    Diagnostic Results:  None      Assessment/Plan:         VALDEZ Skin Integrity Evaluation      Skin Integrity VALDEZ evaluation of patient as part of the comprehensive skin care team.     She has been admitted for 5 days. Skin injury was noted on 1/4/24. POA yes.    (Media file unavailable)      Derm  Irritant contact dermatitis due to fecal, urinary or dual incontinence  - consult received for evaluation of skin injury.  - pt presents today from [facility] for refusal to eat or  drink.   - excoriation noted to sacral, buttocks and labia with red, moist wound base. Due to moisture from urine and stool incontinence.  - large incontinence episode cleaned during assessment. Wounds painful to touch.  - start Triad bid/prn. Orders placed. No foam dressings.  - vaibhav surface. Waffle overlay ordered.  - pillows for offloading.  - turn q2h.   - boston score 17 and nutrition sub scale score of 3.  - nursing to maintain pressure injury prevention measures and continue wound care per orders.         Thank you for your consult. I will follow-up with patient. Please contact us if you have any additional questions.      Filomena Mccollum NP  Skin Integrity VALDEZ  [facility] - Med Surg ([place]-[facility])

## 2024-01-08 NOTE — PLAN OF CARE
Per availity, pt certified in total #[id number], exp 1/10/24.  142 is pending.    MAXIME called [employer] [phone], left msg requesting call back from Queta with Admissions.    CM uploaded 142 to CP.    11:13 AM  CM spoke with Queta from [employer] [phone]; she states she is with family right now; CM to follow.    12:44 PM  KARINA Dallas Domingo [phone] states their preference is [facility].  MAXIME let [facility] know via CP.  MAXIME spoke with Queta, let her know [facility] is pt's first choice.  She states she needs to get paperwork done with family; she has 5 other admits already started and won't have things ready for admit until tomorrow.  Supervisor Coco notified.    MAXIME sent request for auth to be sent to [facility] to [facility] [place] with [facility] via email.    NEY Albert, BS, RN, CCM

## 2024-01-08 NOTE — ASSESSMENT & PLAN NOTE
- consult received for evaluation of skin injury.  - pt presents today from [facility] for refusal to eat or drink.   - excoriation noted to sacral, buttocks and labia with red, moist wound base. Due to moisture from urine and stool incontinence.  - large incontinence episode cleaned during assessment. Wounds painful to touch.  - start Triad bid/prn. Orders placed. No foam dressings.  - vaibhav surface. Waffle overlay ordered.  - pillows for offloading.  - turn q2h.   - boston score 17 and nutrition sub scale score of 3.  - nursing to maintain pressure injury prevention measures and continue wound care per orders.

## 2024-01-08 NOTE — PLAN OF CARE
AAOX4. Hypertensive this AM and occasionally bradycardic throughout night. One BM. PRN PO medication given for back pain and dysuria. PO ABX given. No adverse events noted during this shift.    Problem: Infection  Goal: Absence of Infection Signs and Symptoms  Outcome: Ongoing, Progressing     Problem: Adult Inpatient Plan of Care  Goal: Plan of Care Review  Outcome: Ongoing, Progressing  Flowsheets (Taken 1/8/2024 0656)  Plan of Care Reviewed With: patient  Goal: Patient-Specific Goal (Individualized)  Outcome: Ongoing, Progressing  Goal: Absence of Hospital-Acquired Illness or Injury  Outcome: Ongoing, Progressing  Intervention: Prevent Skin Injury  Flowsheets (Taken 1/8/2024 0656)  Body Position: log-rolled  Skin Protection:   adhesive use limited   incontinence pads utilized   silicone foam dressing in place   transparent dressing maintained   tubing/devices free from skin contact   skin sealant/moisture barrier applied  Goal: Optimal Comfort and Wellbeing  Outcome: Ongoing, Progressing  Goal: Readiness for Transition of Care  Outcome: Ongoing, Progressing     Problem: Adjustment to Illness (Sepsis/Septic Shock)  Goal: Optimal Coping  Outcome: Ongoing, Progressing  Intervention: Optimize Psychosocial Adjustment to Illness  Flowsheets (Taken 1/8/2024 0656)  Supportive Measures:   active listening utilized   verbalization of feelings encouraged   self-care encouraged     Problem: Bleeding (Sepsis/Septic Shock)  Goal: Absence of Bleeding  Outcome: Ongoing, Progressing     Problem: Glycemic Control Impaired (Sepsis/Septic Shock)  Goal: Blood Glucose Level Within Desired Range  Outcome: Ongoing, Progressing     Problem: Infection Progression (Sepsis/Septic Shock)  Goal: Absence of Infection Signs and Symptoms  Outcome: Ongoing, Progressing  Intervention: Promote Stabilization  Flowsheets (Taken 1/8/2024 0656)  Fluid/Electrolyte Management: fluids provided     Problem: Nutrition Impaired (Sepsis/Septic Shock)  Goal:  Optimal Nutrition Intake  Outcome: Ongoing, Progressing     Problem: Fluid and Electrolyte Imbalance (Acute Kidney Injury/Impairment)  Goal: Fluid and Electrolyte Balance  Outcome: Ongoing, Progressing  Intervention: Monitor and Manage Fluid and Electrolyte Balance  Flowsheets (Taken 1/8/2024 0656)  Fluid/Electrolyte Management: fluids provided     Problem: Oral Intake Inadequate (Acute Kidney Injury/Impairment)  Goal: Optimal Nutrition Intake  Outcome: Ongoing, Progressing     Problem: Skin Injury Risk Increased  Goal: Skin Health and Integrity  Outcome: Ongoing, Progressing     Problem: Impaired Wound Healing  Goal: Optimal Wound Healing  Outcome: Ongoing, Progressing  Intervention: Promote Wound Healing  Flowsheets (Taken 1/8/2024 0656)  Sleep/Rest Enhancement:   awakenings minimized   room darkened   regular sleep/rest pattern promoted   noise level reduced  Activity Management:   Rolling - L1   Heel slide - L1   Arm raise - L1   Ankle pumps - L1     Problem: Fall Injury Risk  Goal: Absence of Fall and Fall-Related Injury  Outcome: Ongoing, Progressing  Intervention: Promote Injury-Free Environment  Flowsheets (Taken 1/8/2024 0656)  Safety Promotion/Fall Prevention:   assistive device/personal item within reach   bed alarm set   Fall Risk reviewed with patient/family   medications reviewed   room near unit station   side rails raised x 2   instructed to call staff for mobility   nonskid shoes/socks when out of bed

## 2024-01-08 NOTE — PROGRESS NOTES
"[facility] Surg ([address] Medicine  Progress Note    Patient Name: Patito Domingo  MRN: [id number]  Patient Class: OP- Observation   Admission Date: 1/3/2024  Length of Stay: 0 days  Attending Physician: Norman Juan MD  Primary Care Provider: Mariela Wei DO        Subjective:     Principal Problem:FLORECITA (acute kidney injury)        HPI:  Patito Domingo is a 73 y.o. female with metastatic carcinoid tumor, history of CVA with debility, nephrolithiasis with history of ESBL/VRE UTI, anemia, diastolic CHF, chronic pain who presents today from [facility] for refusal to eat or drink.     Patient states that she "is a little confused" but endorses lower-mid abdominal pain of a sharp nature that has been present for about a day or so. She also endorses dysuria. Denies n/v, fever, or diarrhea, however chart review shows that she has chronic diarrhea. She can only tell me that she takes Oxycodone, which helps with the pain. She endorses chronic back pain which has not changed recently.     Per the ED on discussion with her nursing home, she was sent her for refusal to eat or drink for the past couple of days, which is new for her.     Overview/Hospital Course:  Patient presented with refusal to eat,   Ertapenem started empirically for UTI given hx of ESBL. FLORECITA resolved w/ supportive measures. ID recommendations appreciated - pt switched to cipro for pan sensitive ecoli. .        Interval History:  Patient is still reports having some buttock soreness related to bowel movements.  Otherwise no chest pain, no shortness a breath.  Afebrile.    Review of Systems  Objective:     Vital Signs (Most Recent):  Temp: 98.3 °F (36.8 °C) (01/08/24 1130)  Pulse: 78 (01/08/24 1458)  Resp: 18 (01/08/24 1407)  BP: (!) 145/69 (01/08/24 1130)  SpO2: 98 % (01/08/24 1130) Vital Signs (24h Range):  Temp:  [97.2 °F (36.2 °C)-98.5 °F (36.9 °C)] 98.3 °F (36.8 °C)  Pulse:  [59-79] 78  Resp:  [18-19] " 18  SpO2:  [95 %-98 %] 98 %  BP: (117-167)/(57-73) 145/69     Weight: 72.1 kg (159 lb)  Body mass index is 25.66 kg/m².    Intake/Output Summary (Last 24 hours) at 1/8/2024 1542  Last data filed at 1/8/2024 0630  Gross per 24 hour   Intake 300 ml   Output --   Net 300 ml         Physical Exam      Clear lungs bilaterally, unlabored breathing, on room air, no cyanosis   Heart sounds indicate a regular rate and rhythm  Awake alert, mild distress secondary to buttocks pain   No obvious lower extremity edema     Assessment/Plan:      * FLORECITA (acute kidney injury)  likely 2/2 dehydration  Resolved;       Dermatitis  2/2 contact from BM  Wound consult appreciated w/ orders      Acute cystitis without hematuria  Ucx showing pansesntivie c.rajan.   Cipro per ID.     Abnormal CT scan, lumbar spine  CT abdomen and pelvis shows compression deformity of the anterior superior aspect of L5 at least 6 weeks old per care everywhere  -LS brace  -outpt spine surgery f/u      Other constipation  Severe constipation noted on abdominal CT, likely contributing to her abdominal pain and possible UTI.  This is secondary to debility and chronic opioid use.  We will start a bowel regimen and give an enema.      Chronic diastolic (congestive) heart failure  Stable  Resume home losartan    Elevated troponin  Troponin elevated without EKG changes, and no complaints of chest pain.  Suspect this is due to renal insufficiency and volume depletion.  Continue home beta-blocker.      Nephrolithiasis  Chronic, and noted on CT.  No acute issues.  Monitor.      Chronic pain syndrome  Cautiously continue home pain regimen in the setting of altered mental status and constipation.       Debility  Due to multiple comorbidities and history of CVA. Continue PT/OT upon discharge back to facility.     Metastatic carcinoid tumor  Known metastatic carcinoid with progressive mets on CT today. Likely contributing to her chronic debility. Continue outpatient follow up  with Heme Onc.         VTE Risk Mitigation (From admission, onward)           Ordered     enoxaparin injection 40 mg  Every 24 hours         01/03/24 2007     IP VTE HIGH RISK PATIENT  Once         01/03/24 2007     Place sequential compression device  Until discontinued         01/03/24 2007                    Discharge Planning   JULIO C: 1/8/2024     Code Status: Full Code   Is the patient medically ready for discharge?: Yes    Reason for patient still in hospital (select all that apply): Pending disposition  Discharge Plan A: Return to nursing home (Return to SNF ([facility]))                  Norman Juan MD  Department of Hospital Medicine   [facility] Surg ([place]-16)

## 2024-01-09 LAB
ALBUMIN SERPL BCP-MCNC: 1.9 G/DL (ref 3.5–5.2)
ALP SERPL-CCNC: 140 U/L (ref 55–135)
ALT SERPL W/O P-5'-P-CCNC: 10 U/L (ref 10–44)
ANION GAP SERPL CALC-SCNC: 8 MMOL/L (ref 8–16)
AST SERPL-CCNC: 16 U/L (ref 10–40)
BILIRUB SERPL-MCNC: 0.3 MG/DL (ref 0.1–1)
BUN SERPL-MCNC: 12 MG/DL (ref 8–23)
CALCIUM SERPL-MCNC: 8.4 MG/DL (ref 8.7–10.5)
CHLORIDE SERPL-SCNC: 108 MMOL/L (ref 95–110)
CO2 SERPL-SCNC: 23 MMOL/L (ref 23–29)
CREAT SERPL-MCNC: 0.7 MG/DL (ref 0.5–1.4)
ERYTHROCYTE [DISTWIDTH] IN BLOOD BY AUTOMATED COUNT: 19.7 % (ref 11.5–14.5)
EST. GFR  (NO RACE VARIABLE): >60 ML/MIN/1.73 M^2
GLUCOSE SERPL-MCNC: 92 MG/DL (ref 70–110)
HCT VFR BLD AUTO: 25.6 % (ref 37–48.5)
HGB BLD-MCNC: 7.6 G/DL (ref 12–16)
MCH RBC QN AUTO: 24.3 PG (ref 27–31)
MCHC RBC AUTO-ENTMCNC: 29.7 G/DL (ref 32–36)
MCV RBC AUTO: 82 FL (ref 82–98)
PLATELET # BLD AUTO: 263 K/UL (ref 150–450)
PMV BLD AUTO: 10.8 FL (ref 9.2–12.9)
POTASSIUM SERPL-SCNC: 3.8 MMOL/L (ref 3.5–5.1)
PROT SERPL-MCNC: 6 G/DL (ref 6–8.4)
RBC # BLD AUTO: 3.13 M/UL (ref 4–5.4)
SODIUM SERPL-SCNC: 139 MMOL/L (ref 136–145)
WBC # BLD AUTO: 7.86 K/UL (ref 3.9–12.7)

## 2024-01-09 PROCEDURE — 63600175 PHARM REV CODE 636 W HCPCS: Mod: HCNC | Performed by: STUDENT IN AN ORGANIZED HEALTH CARE EDUCATION/TRAINING PROGRAM

## 2024-01-09 PROCEDURE — 96372 THER/PROPH/DIAG INJ SC/IM: CPT | Performed by: STUDENT IN AN ORGANIZED HEALTH CARE EDUCATION/TRAINING PROGRAM

## 2024-01-09 PROCEDURE — G0378 HOSPITAL OBSERVATION PER HR: HCPCS | Mod: HCNC

## 2024-01-09 PROCEDURE — 85027 COMPLETE CBC AUTOMATED: CPT | Mod: HCNC | Performed by: STUDENT IN AN ORGANIZED HEALTH CARE EDUCATION/TRAINING PROGRAM

## 2024-01-09 PROCEDURE — 97162 PT EVAL MOD COMPLEX 30 MIN: CPT | Mod: HCNC

## 2024-01-09 PROCEDURE — 36415 COLL VENOUS BLD VENIPUNCTURE: CPT | Mod: HCNC | Performed by: STUDENT IN AN ORGANIZED HEALTH CARE EDUCATION/TRAINING PROGRAM

## 2024-01-09 PROCEDURE — 25000003 PHARM REV CODE 250: Mod: HCNC | Performed by: HOSPITALIST

## 2024-01-09 PROCEDURE — 25000003 PHARM REV CODE 250: Mod: HCNC | Performed by: STUDENT IN AN ORGANIZED HEALTH CARE EDUCATION/TRAINING PROGRAM

## 2024-01-09 PROCEDURE — 80053 COMPREHEN METABOLIC PANEL: CPT | Mod: HCNC | Performed by: STUDENT IN AN ORGANIZED HEALTH CARE EDUCATION/TRAINING PROGRAM

## 2024-01-09 PROCEDURE — 94761 N-INVAS EAR/PLS OXIMETRY MLT: CPT | Mod: HCNC

## 2024-01-09 PROCEDURE — 97530 THERAPEUTIC ACTIVITIES: CPT | Mod: HCNC

## 2024-01-09 PROCEDURE — 97165 OT EVAL LOW COMPLEX 30 MIN: CPT | Mod: HCNC

## 2024-01-09 RX ORDER — POLYETHYLENE GLYCOL 3350 17 G/17G
17 POWDER, FOR SOLUTION ORAL 2 TIMES DAILY
Refills: 0
Start: 2024-01-09

## 2024-01-09 RX ORDER — LOSARTAN POTASSIUM 25 MG/1
25 TABLET ORAL DAILY
Qty: 90 TABLET | Refills: 3
Start: 2024-01-10 | End: 2025-01-09

## 2024-01-09 RX ORDER — CIPROFLOXACIN 750 MG/1
750 TABLET, FILM COATED ORAL EVERY 12 HOURS
Start: 2024-01-10 | End: 2024-01-10

## 2024-01-09 RX ADMIN — OXYCODONE HYDROCHLORIDE 15 MG: 10 TABLET ORAL at 05:01

## 2024-01-09 RX ADMIN — ENOXAPARIN SODIUM 40 MG: 40 INJECTION SUBCUTANEOUS at 05:01

## 2024-01-09 RX ADMIN — CIPROFLOXACIN 750 MG: 750 TABLET, FILM COATED ORAL at 11:01

## 2024-01-09 RX ADMIN — Medication 6 MG: at 11:01

## 2024-01-09 RX ADMIN — METOPROLOL TARTRATE 50 MG: 50 TABLET, FILM COATED ORAL at 09:01

## 2024-01-09 RX ADMIN — LIDOCAINE 5% 1 PATCH: 700 PATCH TOPICAL at 09:01

## 2024-01-09 RX ADMIN — Medication 1 CAPSULE: at 09:01

## 2024-01-09 RX ADMIN — OXYCODONE HYDROCHLORIDE 15 MG: 10 TABLET ORAL at 11:01

## 2024-01-09 RX ADMIN — LOSARTAN POTASSIUM 25 MG: 25 TABLET, FILM COATED ORAL at 09:01

## 2024-01-09 NOTE — PROGRESS NOTES
"[facility] Surg ([address] Medicine  Progress Note    Patient Name: Patito Domingo  MRN: [id number]  Patient Class: OP- Observation   Admission Date: 1/3/2024  Length of Stay: 0 days  Attending Physician: Norman Juan MD  Primary Care Provider: Mariela Wei DO        Subjective:     Principal Problem:FLORECITA (acute kidney injury)        HPI:  Patito Domingo is a 73 y.o. female with metastatic carcinoid tumor, history of CVA with debility, nephrolithiasis with history of ESBL/VRE UTI, anemia, diastolic CHF, chronic pain who presents today from [facility] for refusal to eat or drink.     Patient states that she "is a little confused" but endorses lower-mid abdominal pain of a sharp nature that has been present for about a day or so. She also endorses dysuria. Denies n/v, fever, or diarrhea, however chart review shows that she has chronic diarrhea. She can only tell me that she takes Oxycodone, which helps with the pain. She endorses chronic back pain which has not changed recently.     Per the ED on discussion with her nursing home, she was sent her for refusal to eat or drink for the past couple of days, which is new for her.     Overview/Hospital Course:  Patient presented with refusal to eat,   Ertapenem started empirically for UTI given hx of ESBL. FLORECITA resolved w/ supportive measures. Tolerating po now. ID recommendations appreciated - pt switched to cipro for pan sensitive ecoli. .        Interval History:  Patient is still reports rash buttock/coccygeal pain.  Denies any chest pain or shortness a breath.  Afebrile.    Review of Systems  Objective:     Vital Signs (Most Recent):  Temp: 98.1 °F (36.7 °C) (01/09/24 1532)  Pulse: 69 (01/09/24 1532)  Resp: 18 (01/09/24 1532)  BP: 121/68 (01/09/24 1532)  SpO2: 98 % (01/09/24 1532) Vital Signs (24h Range):  Temp:  [97.9 °F (36.6 °C)-98.9 °F (37.2 °C)] 98.1 °F (36.7 °C)  Pulse:  [66-79] 69  Resp:  [16-18] 18  SpO2:  [95 " %-100 %] 98 %  BP: ()/(47-71) 121/68     Weight: 72.1 kg (159 lb)  Body mass index is 25.66 kg/m².    Intake/Output Summary (Last 24 hours) at 1/9/2024 1614  Last data filed at 1/9/2024 1300  Gross per 24 hour   Intake 830 ml   Output --   Net 830 ml         Physical Exam      Clear lungs bilaterally, unlabored breathing, on room air, no cyanosis  Heart sounds indicate a regular rate and rhythm  Awake alert, mild distress 2/2 buttock pain No facial droop, no slurred speech   Buttocks and vaginal folds examined in the presence of female tech and female nurse.  Does have some excoriation noted in the vaginal folds as well as in the perirectal area; no signs of infection including erythema edema or drainage noted.  No obvious lower extremity edema     Assessment/Plan:      * FLORECITA (acute kidney injury)  likely 2/2 dehydration  Resolved;       Dermatitis  2/2 contact from BM  Wound consult appreciated w/ orders  Barrier cream      Acute cystitis without hematuria  Ucx showing pansesntivie c.rajan.   Cipro per ID.     Abnormal CT scan, lumbar spine  CT abdomen and pelvis shows compression deformity of the anterior superior aspect of L5 at least 6 weeks old per care everywhere  -LS brace  -outpt spine surgery f/u      Other constipation  Severe constipation noted on abdominal CT, likely contributing to her abdominal pain and possible UTI.  This is secondary to debility and chronic opioid use.  We will start a bowel regimen and give an enema.      Chronic diastolic (congestive) heart failure  Stable  Resume home losartan    Elevated troponin  Troponin elevated without EKG changes, and no complaints of chest pain.  Suspect this is due to renal insufficiency and volume depletion.  Continue home beta-blocker.      Nephrolithiasis  Chronic, and noted on CT.  No acute issues.  Monitor.      Chronic pain syndrome  Cautiously continue home pain regimen in the setting of altered mental status and constipation.        Debility  Due to multiple comorbidities and history of CVA. Continue PT/OT upon discharge back to facility.     Metastatic carcinoid tumor  Known metastatic carcinoid with progressive mets on CT today. Likely contributing to her chronic debility. Continue outpatient follow up with Heme Onc.         VTE Risk Mitigation (From admission, onward)           Ordered     enoxaparin injection 40 mg  Every 24 hours         01/03/24 2007     IP VTE HIGH RISK PATIENT  Once         01/03/24 2007     Place sequential compression device  Until discontinued         01/03/24 2007                    Discharge Planning   JULIO C: 1/9/2024     Code Status: Full Code   Is the patient medically ready for discharge?: Yes    Reason for patient still in hospital (select all that apply): Pending disposition  Discharge Plan A: Return to nursing home (Return to SNF ([facility]))                  Norman Juan MD  Department of Hospital Medicine   [facility] S[facility] ([place]-16)

## 2024-01-09 NOTE — PT/OT/SLP EVAL
Physical Therapy Partial Co-Evaluation and Co-Treatment    Patient Name:  Patito Domingo   MRN:  [id number]  Admit Date: 1/3/2024  Admitting Diagnosis:  FLORECITA (acute kidney injury)   Length of Stay: 0 days  Recent Surgery: * No surgery found *      Recommendations:     Discharge Recommendations:    Moderate Intensity Therapy   Discharge Equipment Recommendations: to be determined by next level of care   Barriers to discharge: Decreased caregiver support and increased need for assistance    Plan:     During this hospitalization, patient to be seen 3 x/week to address the identified rehab impairments via gait training, therapeutic activities, therapeutic exercises, neuromuscular re-education and progress towards the established goals.  Plan of Care Expires:  02/09/24  Plan of Care Reviewed with: patient    Assessment:     Patito Domingo is a 73 y.o. female admitted with a medical diagnosis of FLORECITA (acute kidney injury). Pt agreeable to therapy session. Per chart review, pt presents from [facility]. Patient AAOx2 during session with mild confusion noted but able to answer questions regarding living situation and medical equipment. Patient able to sit EOB for 10 minutes this session but requiring significant assistance for bed mobility and sit to stand transfers this date. Patient unable to take steps this session 2/2 unable to reach full stand. Patient functioning below her baseline PLOF and is a fall risk at this time. Patient would benefit from skilled therapy services to maximize safety and independence, increase activity tolerance, decrease fall risk, decrease caregiver burden, improve QOL, improve patient's functional mobility, and decrease risk of contractures and pressure sores. Patient currently demonstrates a need for moderate intensity therapy on a daily basis post acute secondary to a decline in functional status due to illness     Problem List: weakness, impaired endurance, impaired self care skills,  "impaired functional mobility, gait instability, impaired balance, impaired cognition, decreased coordination, decreased upper extremity function, decreased lower extremity function, decreased safety awareness, pain, decreased ROM, impaired skin.  Rehab Prognosis: Good; patient would benefit from acute skilled PT services to address these deficits and reach maximum level of function.      Goals:   Multidisciplinary Problems       Physical Therapy Goals          Problem: Physical Therapy    Goal Priority Disciplines Outcome Goal Variances Interventions   Physical Therapy Goal     PT, PT/OT Ongoing, Progressing     Description: Goals to be met by: 24     Patient will increase functional independence with mobility by performin. Supine to sit with Contact Guard Assistance  2. Sit to supine with Contact Guard Assistance  3. Rolling to Left and Right with Stand-by Assistance.  4. Sit to stand transfer with Moderate Assistance  5. Bed to chair transfer with Moderate Assistance using LRAD  6. Gait  x 10 feet with Moderate Assistance using LRAD.   7. Wheelchair propulsion x100 feet with Contact Guard Assistance using bilateral uppper extremities  8. Lower extremity exercise program x30 reps per handout, with independence  9. Sit edge of bed for 15 minutes with Supervision                           Subjective     RN notified prior to session. No one present upon PT entrance into room. Patient agreeable to PT evaluation.    Chief Complaint: "So when am I going to be able to walk?"  Patient/Family Comments/goals: walk again  Pain/Comfort:  Pain Rating 1: other (see comments) (unrated)  Location - Orientation 1: generalized  Location 1: sacral spine (2/2 wound)  Pain Addressed 1: Distraction, Reposition, Cessation of Activity  Pain Rating Post-Intervention 1: other (see comments) (unrated)    Social History:  Residence: Per chart review, pt presents from [facility]. Per patient, prior to SNF stay patient lived " "alone  1st level apartment with 0 [address]. Pt's bathroom has a tub shower with a bath bench.  Support available:  pt reports "worker" came to house 3x/wk for 5 hours a day to assist with cleaning and ADLs; did not report any additional assistance  Equipment Owned (using) (pt reported): walker, rolling, wheelchair, bedside commode, bath bench, cane, straight  Prior level of function: Pt reports non-ambulatory at SNF but practicing standing. She reports needing assistance with ADLs.       Patient reports they will have assistance from hired help (no 24/7 assistance) upon discharge.    Objective:     Additional staff present: OT; OT for co-evaluation due to suspected patient need for two skilled therapists to appropriately assess patient's functional deficits as well as ensure patient safety, accommodate for limited activity tolerance, and provide appropriate, skilled assistance to maximize functional potential during evaluation.    Patient found HOB elevated with: telemetry     General Precautions: Standard, Cardiac fall   Orthopedic Precautions:N/A   Braces: N/A   Body mass index is 25.66 kg/m².  Oxygen Device: Room Air  Vitals: /62 (Patient Position: Lying)   Pulse 68   Temp 98.1 °F (36.7 °C) (Oral)   Resp 18   Ht 5' 6" (1.676 m)   Wt 72.1 kg (159 lb)   LMP  (LMP Unknown)   SpO2 98%   Breastfeeding No   BMI 25.66 kg/m²       Exams:    Cognition:  Alert, Distractible, Cooperative, Confused, and Flat affect  Command following: Follows two-step verbal commands  Communication: clear/fluent    Sensation:   Light touch sensation: Intact BLEs    Gross Motor Coordination: No deficits noted during functional mobility tasks     Edema/Skin Integrity: None noted; Wound sacral spine    Postural examination/scapula alignment: Rounded shoulder, Head forward, and Slouched posture    Lower Extremity Range of Motion:  Right Lower Extremity: WFL except AROM hip flexors limited, PROM WFL  Left Lower Extremity: WFL except " AROM hip flexors limited, PROM WFL    Lower Extremity Strength:  Right Lower Extremity: hip flexors 2/5; knee flex/ext 3/5; ankle PF/DF 3/5  Left Lower Extremity: hip flexors 2/5; knee flex/ext 3/5; ankle PF/DF 3/5      Functional Mobility:    Bed Mobility:  Rolling to right with minimum assistance  Rolling to left with minimum assistance  Rolling performed to assist pt with pericare  Scooting to have feet on floor with moderate assistance  Scooting towards HOB total assistance x2 persons  Supine > Sit with maximal assistance  Sit > Supine with maximal assistance and 2 persons    Transfers:   Sit <> Stand Transfer: Maximum Assistance and of 2 persons  x 1 trials from EOB with no AD   Pt reached ~75% full stand as pt with decreased hip and knee extension and increased thoracic flexion             Gait:  Not performed 2/2 pt safety    Balance:  Sitting Balance  Static: max A progressing to min A  Time: 10 minutes EOB  Comment: Pt initially max A and resisting sitting up 2/2 urge to urinate. Once optimally positioned, pt progressed to min A  Pt with slouched posture, requiring VC/TC for thoracic extension and scapular retraction  Dynamic: maximal assistance  Standing:  Static: maximal assistance and of 2 persons  Dynamic:  n/a    Outcome Measures:  AM-PAC 6 CLICK MOBILITY  Turning over in bed (including adjusting bedclothes, sheets and blankets)?: 3  Sitting down on and standing up from a chair with arms (e.g., wheelchair, bedside commode, etc.): 2  Moving from lying on back to sitting on the side of the bed?: 2  Moving to and from a bed to a chair (including a wheelchair)?: 2  Need to walk in hospital room?: 1  Climbing 3-5 steps with a railing?: 1  Basic Mobility Total Score: 11     Education:  Time provided for education, counseling and discussion of health disposition in regards to patient's current status  All questions answered within PT scope of practice and to patient's satisfaction  PT role in POC to address  current functional deficits  Pt educated on proper body mechanics, safety techniques, and energy conservation with PT facilitation and cueing throughout session    Patient left HOB elevated with all lines intact and call button in reach.      History:     Past Medical History:   Diagnosis Date    Anemia of chronic disease     Also with iron deficiency    Bacteremia due to Klebsiella pneumoniae     Cataract     Chronic diastolic (congestive) heart failure     Colon cancer     Encounter for blood transfusion     History of ESBL E. 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)        Past Surgical History:   Procedure Laterality Date    ABDOMINAL SURGERY      CATARACT EXTRACTION Left 10/2017     SECTION      CHOLECYSTECTOMY      COLON SURGERY      cystoscope      CYSTOSCOPY W/ RETROGRADES Right 10/10/2019    Procedure: CYSTOSCOPY, WITH RETROGRADE PYELOGRAM;  Surgeon: Gen Isbell MD;  Location: [facility];  Service: Urology;  Laterality: Right;    ERCP N/A 2021    Procedure: ERCP (ENDOSCOPIC RETROGRADE CHOLANGIOPANCREATOGRAPHY);  Surgeon: Jayjay Rivera MD;  Location: [address]);  Service: Endoscopy;  Laterality: N/A;    ERCP N/A 3/4/2022    Procedure: ERCP (ENDOSCOPIC RETROGRADE CHOLANGIOPANCREATOGRAPHY);  Surgeon: Roby Virgen MD;  Location: [facility] ENDO ([address] [place] [address]);  Service: Endoscopy;  Laterality: N/A;    EYE SURGERY      HYSTERECTOMY  1996    LITHOTRIPSY      LIVER BIOPSY      carcinoid    URETEROSCOPY  Right 10/10/2019    Procedure: URETEROSCOPY;  Surgeon: Gen Isbell MD;  Location: [facility];  Service: Urology;  Laterality: Right;    UTERINE FIBROID SURGERY         Family History   Problem Relation Age of Onset    Cancer Mother         unknown    Alzheimer's disease Father     Stroke Sister     No Known Problems Son     No Known Problems Son     No Known Problems Son     No Known Problems Son     Kidney disease Neg Hx        Social History     Socioeconomic History    Marital status:    Occupational History    Occupation: disabled    Tobacco Use    Smoking status: Never    Smokeless tobacco: Never   Substance and Sexual Activity    Alcohol use: No     Alcohol/week: 0.0 standard drinks of alcohol    Drug use: No    Sexual activity: Not Currently   Social History Narrative    Pt lives with son     Social Determinants of Health     Financial Resource Strain: Low Risk  (1/4/2024)    Overall Financial Resource Strain (CARDIA)     Difficulty of Paying Living Expenses: Not hard at all   Food Insecurity: No Food Insecurity (1/4/2024)    Hunger Vital Sign     Worried About Running Out of Food in the Last Year: Never true     Ran Out of Food in the Last Year: Never true   Transportation Needs: No Transportation Needs (1/4/2024)    PRAPARE - Transportation     Lack of Transportation (Medical): No     Lack of Transportation (Non-Medical): No   Physical Activity: Inactive (1/4/2024)    Exercise Vital Sign     Days of Exercise per Week: 0 days     Minutes of Exercise per Session: 0 min   Stress: Patient Declined (1/4/2024)    [place] [place] [employer] - Occupational Stress Questionnaire     Feeling of Stress : Patient declined   Social Connections: Socially Isolated (1/4/2024)    Social Connection and Isolation Panel [NHANES]     Frequency of Communication with Friends and Family: More than three times a week     Frequency of Social Gatherings with Friends and Family: More than three  times a week     Attends Worship Services: Never     Active Member of Clubs or Organizations: No     Attends Club or Organization Meetings: Never     Marital Status:    Housing Stability: Low Risk  (1/4/2024)    Housing Stability Vital Sign     Unable to Pay for Housing in the Last Year: No     Number of Places Lived in the Last Year: 1     Unstable Housing in the Last Year: No       Time Tracking:     PT Received On: 01/09/24  PT Start Time: 1433     PT Stop Time: 1457  PT Total Time (min): 24 min     Billable Minutes: Evaluation 12 minutes and Therapeutic Activity 12 minutes    1/9/2024

## 2024-01-09 NOTE — PLAN OF CARE
Pt engaged well in evaluative session, though perseverated on urge to urinate which distracted from therapeutic activities.     Problem: Occupational Therapy  Goal: Occupational Therapy Goal  Description: Goals to be met by: 2/9/24     Patient will increase functional independence with ADLs by performing:    UE Dressing with Minimal Assistance.  LE Dressing with Moderate Assistance.  Grooming while EOB with Minimal Assistance.  Toileting from bedside commode with Moderate Assistance for hygiene and clothing management.   Sitting at edge of bed x10 minutes with Stand-by Assistance.  Rolling to Bilateral with Stand-by Assistance.   Supine to sit with Minimal Assistance.  Squat pivot transfers with Moderate Assistance.  Toilet transfer to bedside commode with Moderate Assistance using squat pivot transfer  Upper extremity exercise program x10 reps per handout, with assistance as needed.    Outcome: Ongoing, Progressing

## 2024-01-09 NOTE — PT/OT/SLP EVAL
Occupational Therapy  Co -  Evaluation (partial) with PT    Co-evaluation/treatment performed due to patient's multiple deficits requiring two skilled therapists to appropriately and safely assess patient's strength and endurance while facilitating functional tasks in addition to accommodating for patient's activity tolerance.       Name: Patito Domingo  MRN: [id number]  Admitting Diagnosis: FLORECITA (acute kidney injury)  Recent Surgery: * No surgery found *      Recommendations:     Discharge Recommendations:    Discharge Equipment Recommendations:  to be determined by next level of care  Barriers to discharge:   (increased skilled A needed)    Assessment:     Patito Domingo is a 73 y.o. female with a medical diagnosis of FLORECITA (acute kidney injury).  She presents with performance deficits affecting function: weakness, impaired endurance, impaired self care skills, impaired functional mobility, gait instability, impaired balance, impaired cognition, decreased coordination, decreased upper extremity function, decreased lower extremity function, abnormal tone, pain, decreased safety awareness, impaired coordination, decreased ROM, impaired skin, impaired joint extensibility.  Pt encountered in midst of PT evaluation, sitting upright EOB with PT with L lean. Pt able to complete BUE strength testing while sitting EOB with PT providing trunk support for R lean, as pt perseverating on wanting to return to bed to attempt urination. Pt required significant assistance of 2 people to stand EOB, unable to extend at hips and knees and remained in forward flexion ~10sec before asking to sit down.  Patient currently demonstrates a need for moderate intensity therapy on a daily basis post acute secondary to a decline in functional status due to illness.      Rehab Prognosis: Good; patient would benefit from acute skilled OT services to address these deficits and reach maximum level of function.       Plan:     Patient to be seen 3  "x/week to address the above listed problems via self-care/home management, therapeutic activities, therapeutic exercises  Plan of Care Expires: 02/09/24  Plan of Care Reviewed with: patient    Subjective     Chief Complaint: "My back hurts a little"   Patient/Family Comments/goals: Get better, return to PLOF    Occupational Profile: Pt is a questionable historian  Living Environment: Pt currently resides in nursing home/SNF to regain strength.  Prior to SNF, per chart review pt resided alone in 1st floor apt with 0 KESHAWN, tub/shower. A caregiver assisted 3xwk ~5 hours with ADLs  Previous level of function: Pt states she hasn't walked recently but has been standing at SNF and transferring with squat pivot. Requires max A for ADLs  Roles and Routines: mother  Equipment Used at Home: walker, rolling, wheelchair, bedside commode, bath bench, cane, straight  Assistance upon Discharge: SNF staff    Pain/Comfort:  Pain Rating 1:  not rated but winced/groan with posterior pericare 2* wound  Location - Orientation 1: generalized  Location 1: sacral spine 2* wound  Pain Addressed 1: Reposition, Distraction, Cessation of Activity, Nurse notified  Pain Rating Post-Intervention 1:  not rated    Patients cultural, spiritual, Christianity conflicts given the current situation: no    Objective:     Communicated with: RN prior to session.  Patient found sitting edge of bed with PT with telemetry upon OT entry to room.    General Precautions: Standard, fall  Orthopedic Precautions: N/A  Braces: N/A  Respiratory Status: Room air    Occupational Performance:    Bed Mobility:    Patient completed Rolling/Turning to Left with  minimum assistance  Patient completed Rolling/Turning to Right with minimum assistance  Patient completed Scooting/Bridging  To HOB while supine with total assistance and 2 persons  Patient completed Supine to Sit with maximal assistance (per PT)  Patient completed Sit to Supine with maximal assistance and 2 " persons  Pt sat EOB ~10min with max A progressing to CGA/min A, though attempted to return to supine several times 2* perseverating on urinary urge    Functional Mobility/Transfers:  Patient completed Sit <> Stand Transfer with maximal assistance and of 2 persons  with  hand-held assist , pt remained in flexed position and unable to extend at hips/back to stand upright despite prompts    Activities of Daily Living:  Upper Body Dressing: maximal assistance affixing hospital gown at neck and back ties to maximize coverage  Toileting: total assistance and of 2 persons to complete posterior/anterior perineal care , replacing rell pads    Cognitive/Visual Perceptual:  Cognitive/Psychosocial Skills:     -       Oriented to: Person   -       Follows Commands/attention:Follows two-step commands  -       Communication: soft voice  -       Memory: Impaired STM, Impaired LTM, and Poor immediate recall  -       Safety awareness/insight to disability: impaired   -       Mood/Affect/Coping skills/emotional control: Pleasant  Visual/Perceptual:      -Intact      Physical Exam:  Balance:    -       Sitting: Impaired.  Standing: Impaired  Postural examination/scapula alignment:    -       Rounded shoulders  -       Forward head  Skin integrity: Visible skin intact  Upper Extremity Range of Motion:     -       Right Upper Extremity: Deficits: unable to flex shoulder > 70deg  -       Left Upper Extremity: Deficits: unable to flex shoulder >70deg  Upper Extremity Strength:    -       Right Upper Extremity: Deficits: 3+/5  -       Left Upper Extremity: Deficits: 3+/5   Strength:    -       Right Upper Extremity: Deficits: 3+/5  -       Left Upper Extremity: Deficits: 3+/5  Fine Motor Coordination:    -       Intact  Neurological:    -       Impaired    AMPAC 6 Click ADL:  AMPAC Total Score: 12    Treatment & Education:  Pt educated on role of OT, POC, and goals for therapy.    POC was dicussed with patient/caregiver, who was  included in its development and is in agreement with the identified goals and treatment plan.   Patient and family aware of patient's deficits and therapy progression.   Time provided for therapeutic counseling and discussion of health disposition.   Educated on importance of EOB/OOB mobility, maintaining routine, sitting up in chair, and maximizing independence with ADLs during admission   Pt completed ADLs and functional mobility for treatment session as noted above   Pt/caregiver verbalized understanding and expressed no further concerns/questions.      Patient left HOB elevated with all lines intact, call button in reach, and RN notified    GOALS:   Multidisciplinary Problems       Occupational Therapy Goals          Problem: Occupational Therapy    Goal Priority Disciplines Outcome Interventions   Occupational Therapy Goal     OT, PT/OT Ongoing, Progressing    Description: Goals to be met by: 2/9/24     Patient will increase functional independence with ADLs by performing:    UE Dressing with Minimal Assistance.  LE Dressing with Moderate Assistance.  Grooming while EOB with Minimal Assistance.  Toileting from bedside commode with Moderate Assistance for hygiene and clothing management.   Sitting at edge of bed x10 minutes with Stand-by Assistance.  Rolling to Bilateral with Stand-by Assistance.   Supine to sit with Minimal Assistance.  Squat pivot transfers with Moderate Assistance.  Toilet transfer to bedside commode with Moderate Assistance using squat pivot transfer  Upper extremity exercise program x10 reps per handout, with assistance as needed.                         History:     Past Medical History:   Diagnosis Date    Anemia of chronic disease     Also with iron deficiency    Bacteremia due to Klebsiella pneumoniae     Cataract     Chronic diastolic (congestive) heart failure     Colon cancer     Encounter for blood transfusion     History of ESBL E. 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;  Service: Urology;  Laterality: Right;    ERCP N/A 2021    Procedure: ERCP (ENDOSCOPIC RETROGRADE CHOLANGIOPANCREATOGRAPHY);  Surgeon: Jayjay Rivera MD;  Location: [facility] ([address] [place] [address]);  Service: Endoscopy;  Laterality: N/A;    ERCP N/A 3/4/2022    Procedure: ERCP (ENDOSCOPIC RETROGRADE CHOLANGIOPANCREATOGRAPHY);  Surgeon: Roby Virgen MD;  Location: [facility] ([address] [place] [address]);  Service: Endoscopy;  Laterality: N/A;    EYE SURGERY      HYSTERECTOMY  1996    LITHOTRIPSY      LIVER BIOPSY      carcinoid    URETEROSCOPY Right 10/10/2019    Procedure: URETEROSCOPY;  Surgeon: Gen Isbell MD;  Location: [facility];  Service: Urology;  Laterality: Right;    UTERINE FIBROID SURGERY         Time Tracking:     OT Date of Treatment: 24  OT Start Time: 1445  OT Stop Time: 1457  OT Total Time (min): 12 min    Billable Minutes:Evaluation 12    2024

## 2024-01-09 NOTE — SUBJECTIVE & OBJECTIVE
Interval History:  Patient is still reports rash buttock/coccygeal pain.  Denies any chest pain or shortness a breath.  Afebrile.    Review of Systems  Objective:     Vital Signs (Most Recent):  Temp: 98.1 °F (36.7 °C) (01/09/24 1532)  Pulse: 69 (01/09/24 1532)  Resp: 18 (01/09/24 1532)  BP: 121/68 (01/09/24 1532)  SpO2: 98 % (01/09/24 1532) Vital Signs (24h Range):  Temp:  [97.9 °F (36.6 °C)-98.9 °F (37.2 °C)] 98.1 °F (36.7 °C)  Pulse:  [66-79] 69  Resp:  [16-18] 18  SpO2:  [95 %-100 %] 98 %  BP: ()/(47-71) 121/68     Weight: 72.1 kg (159 lb)  Body mass index is 25.66 kg/m².    Intake/Output Summary (Last 24 hours) at 1/9/2024 1614  Last data filed at 1/9/2024 1300  Gross per 24 hour   Intake 830 ml   Output --   Net 830 ml         Physical Exam      Clear lungs bilaterally, unlabored breathing, on room air, no cyanosis  Heart sounds indicate a regular rate and rhythm  Awake alert, mild distress 2/2 buttock pain No facial droop, no slurred speech   Buttocks and vaginal folds examined in the presence of female tech and female nurse.  Does have some excoriation noted in the vaginal folds as well as in the perirectal area; no signs of infection including erythema edema or drainage noted.  No obvious lower extremity edema

## 2024-01-09 NOTE — PLAN OF CARE
[facility]    FACILITY TRANSFER ORDERS      Patient Name: Patito Domingo  YOB: 1950    PCP: Mariela Wei DO   PCP Address: [address] [place] [address] / Ivonne NEWMAN  PCP Phone Number: [phone]  PCP Fax: [phone]    Encounter Date: 01/11/2024    Admit to: SNF    Vital Signs:  Routine    Diagnoses:   Active Hospital Problems    Diagnosis  POA    *FLORECITA (acute kidney injury) [N17.9]  Yes    Irritant contact dermatitis due to fecal, urinary or dual incontinence [L24.A2]  Yes    Dermatitis [L30.9]  No    Other constipation [K59.09]  Yes     Chronic    Abnormal CT scan, lumbar spine [R93.7]  Yes    Acute cystitis without hematuria [N30.00]  Yes    Chronic diastolic (congestive) heart failure [I50.32]  Yes     Chronic    Elevated troponin [R79.89]  Yes    Nephrolithiasis [N20.0]  Yes    Chronic pain syndrome [G89.4]  Yes     Chronic    Debility [R53.81]  Yes     Chronic     Will make referral for hh ot pt eval and treat       Metastatic carcinoid tumor [C7B.00]  Yes     Chronic     Cont per heme / onc          Anemia of chronic disease [D63.8] 01/07/2024 Yes     Chronic     Formatting of this note might be different from the original.  Last Assessment & Plan:   The patient's H/H is stable and consistent with previous laboratory measurements, and the patient exhibits no signs or symptoms of acute bleeding; there is no indication for transfusion.  Will continue to monitor.         Allergies:  Review of patient's allergies indicates:   Allergen Reactions    Contrast media Hives, Itching and Swelling    Epinephrine Anaphylaxis     Can cause  a Carcinoid Crisis    Ibuprofen Hives, Itching and Swelling    Zofran [ondansetron hcl] Itching     And multiple other reactions    Iodinated contrast media     Morphine Other (See Comments)    Sulfa (sulfonamide antibiotics) Hives, Itching and Swelling       Diet: regular diet    Activities: Up with assistance    Goals of Care Treatment  Preferences:  Code Status: Full Code    Health care agent: Dallas Brink  Health care agent number: [phone] cell          What is most important right now is to focus on spending time at home, avoiding the hospital, remaining as independent as possible, symptom/pain control, comfort and QOL .  Accordingly, we have decided that the best plan to meet the patient's goals includes continuing with treatment.      Nursing: routine     Labs: per unit routine    CONSULTS:    Physical Therapy to evaluate and treat. , Occupational Therapy to evaluate and treat., and  to evaluate for community resources/long-range planning.  PT/OT 5x week    MISCELLANEOUS CARE:  Routine Skin for Bedridden Patients: Apply moisture barrier cream to all skin folds and wet areas in perineal area daily and after baths and all bowel movements.    Wear LSO brace when out of bed    WOUND CARE ORDERS  vaginal folds BID  - Cleanse wound with soap and water  -Pat dry.  -Apply a thin layer of triad ointment to wound bid/prn.  -No foam dressings  - vaibhav surface. Waffle overlay ordered.  - pillows for offloading.  - turn q2h.    buttocks BID  - Cleanse wound with soap and water  -Pat dry.  -Apply a thin layer of triad ointment to wound bid/prn.  -No foam dressings  - vaibhav surface. Waffle overlay ordered.  - pillows for offloading.  - turn q2h.   Coccyx BID  - Cleanse wound with soap and water  -Pat dry.  -Apply a thin layer of triad ointment to wound bid/prn.  -No foam dressings  - vabihav surface. Waffle overlay ordered.  - pillows for offloading.  - turn q2h.        Medications: Review discharge medications with patient and family and provide education.      Current Discharge Medication List        START taking these medications    Details   ciprofloxacin HCl (CIPRO) 750 MG tablet Take 1 tablet (750 mg total) by mouth every 12 (twelve) hours. Dx: UTI for 2 days  Qty: 6 tablet, Refills: 0           CONTINUE these medications  which have CHANGED    Details   losartan (COZAAR) 25 MG tablet Take 1 tablet (25 mg total) by mouth once daily.  Qty: 90 tablet, Refills: 3    Comments: .      oxyCODONE (ROXICODONE) 15 MG Tab Take 1 tablet (15 mg total) by mouth every 6 (six) hours as needed for Pain.  Qty: 15 tablet, Refills: 0    Comments: Quantity prescribed more than 7 day supply? Yes, quantity medically necessary  Associated Diagnoses: Metastatic carcinoid tumor      polyethylene glycol (GLYCOLAX) 17 gram PwPk Take 17 g by mouth 2 (two) times daily.  Refills: 0           CONTINUE these medications which have NOT CHANGED    Details   acetaminophen (TYLENOL) 325 MG tablet Take 650 mg by mouth every 6 (six) hours.      DAILY PROBIOTIC, S. BOULARDII, 250 mg capsule Take 250 mg by mouth once daily.      metoprolol tartrate (LOPRESSOR) 50 MG tablet Take 50 mg by mouth 2 (two) times daily.      multivitamin (THERAGRAN) per tablet Take 1 tablet by mouth once daily.           STOP taking these medications       amLODIPine (NORVASC) 10 MG tablet Comments:   Reason for Stopping:         diphenoxylate-atropine 2.5-0.025 mg (LOMOTIL) 2.5-0.025 mg per tablet Comments:   Reason for Stopping:         potassium chloride SA (K-DUR,KLOR-CON) 20 MEQ tablet Comments:   Reason for Stopping:         senna (SENOKOT) 8.6 mg tablet Comments:   Reason for Stopping:         water Liqd 150 mL with MILK OF MAGNESIA 400 mg/5 mL Susp 400 mg, diphenhydrAMINE 12.5 mg/5 mL Elix 60 mg, nystatin 100,000 unit/mL Susp 500,000 Units Comments:   Reason for Stopping:                  Immunizations Administered as of 1/10/2024       Name Date Dose VIS Date Route Exp Date    COVID-19, MRNA, LN-S, PF (Pfizer) (Purple Cap) 8/14/2021 0.3 mL 5/10/2021 Intramuscular --    Site: Right arm     : [employer]     Lot: [id number]     Comment: Adminis     COVID-19, MRNA, LN-S, PF (Pfizer) (Purple Cap) 7/25/2021 0.3 mL 5/10/2021 Intramuscular --    Site: Right arm     : [employer]  Inc     Lot: [id number]     External: Auto Reconciled From Outside Source     Comment: Adminis                    _________________________________  Norman Juan MD  01/11/2024

## 2024-01-09 NOTE — PLAN OF CARE
PT evaluation completed- see note for details. PT POC and goals established.    PT Recommendation: Moderate Intensity Therapy   Problem: Physical Therapy  Goal: Physical Therapy Goal  Description: Goals to be met by: 24     Patient will increase functional independence with mobility by performin. Supine to sit with Contact Guard Assistance  2. Sit to supine with Contact Guard Assistance  3. Rolling to Left and Right with Stand-by Assistance.  4. Sit to stand transfer with Moderate Assistance  5. Bed to chair transfer with Moderate Assistance using LRAD  6. Gait  x 10 feet with Moderate Assistance using LRAD.   7. Wheelchair propulsion x100 feet with Contact Guard Assistance using bilateral uppper extremities  8. Lower extremity exercise program x30 reps per handout, with independence  9. Sit edge of bed for 15 minutes with Supervision      Outcome: Ongoing, Progressing

## 2024-01-09 NOTE — PLAN OF CARE
AAOX4. Hypertensive this AM. One BM - Triad applied. PRN PO medication given for back pain and dysuria. PO ABX given. No adverse events noted during this shift.     Problem: Infection  Goal: Absence of Infection Signs and Symptoms  Outcome: Ongoing, Progressing  Intervention: Prevent or Manage Infection  Flowsheets (Taken 1/9/2024 0806)  Isolation Precautions:   contact   precautions maintained     Problem: Adult Inpatient Plan of Care  Goal: Plan of Care Review  Outcome: Ongoing, Progressing  Flowsheets (Taken 1/9/2024 0806)  Plan of Care Reviewed With: patient  Goal: Patient-Specific Goal (Individualized)  Outcome: Ongoing, Progressing  Goal: Absence of Hospital-Acquired Illness or Injury  Outcome: Ongoing, Progressing  Intervention: Prevent and Manage VTE (Venous Thromboembolism) Risk  Flowsheets (Taken 1/9/2024 0806)  Range of Motion: active ROM (range of motion) encouraged  Goal: Optimal Comfort and Wellbeing  Outcome: Ongoing, Progressing  Goal: Readiness for Transition of Care  Outcome: Ongoing, Progressing     Problem: Adjustment to Illness (Sepsis/Septic Shock)  Goal: Optimal Coping  Outcome: Ongoing, Progressing  Intervention: Optimize Psychosocial Adjustment to Illness  Flowsheets (Taken 1/9/2024 0806)  Supportive Measures:   active listening utilized   self-reflection promoted   self-care encouraged   verbalization of feelings encouraged     Problem: Nutrition Impaired (Sepsis/Septic Shock)  Goal: Optimal Nutrition Intake  Outcome: Ongoing, Progressing     Problem: Fluid and Electrolyte Imbalance (Acute Kidney Injury/Impairment)  Goal: Fluid and Electrolyte Balance  Outcome: Ongoing, Progressing     Problem: Oral Intake Inadequate (Acute Kidney Injury/Impairment)  Goal: Optimal Nutrition Intake  Outcome: Ongoing, Progressing     Problem: Skin Injury Risk Increased  Goal: Skin Health and Integrity  Outcome: Ongoing, Progressing  Intervention: Optimize Skin Protection  Flowsheets (Taken 1/9/2024  0806)  Pressure Reduction Techniques:   frequent weight shift encouraged   weight shift assistance provided  Pressure Reduction Devices:   positioning supports utilized   heel offloading device utilized  Skin Protection:   adhesive use limited   skin sealant/moisture barrier applied   transparent dressing maintained   tubing/devices free from skin contact     Problem: Impaired Wound Healing  Goal: Optimal Wound Healing  Outcome: Ongoing, Progressing  Intervention: Promote Wound Healing  Flowsheets (Taken 1/9/2024 0806)  Sleep/Rest Enhancement:   awakenings minimized   regular sleep/rest pattern promoted   room darkened   relaxation techniques promoted   noise level reduced     Problem: Fall Injury Risk  Goal: Absence of Fall and Fall-Related Injury  Outcome: Ongoing, Progressing  Intervention: Promote Injury-Free Environment  Flowsheets (Taken 1/9/2024 0806)  Safety Promotion/Fall Prevention:   assistive device/personal item within reach   Fall Risk reviewed with patient/family   medications reviewed   nonskid shoes/socks when out of bed   side rails raised x 3   room near unit station

## 2024-01-09 NOTE — PLAN OF CARE
MAXIME sent Romelia Sebastian MD progress note and CXR to [facility] via CP.    MAXIME spoke with Queta at [facility], who states they will need PT/OT notes in order to admit pt.  No PT/OT yet on this pt.  MAXIME requested PT/OT orders from Dr. Juan.    MAXIME spoke with pt's son Dallas [phone]; he states they still need to sign paperwork for [facility].  Instructed that since pt medically ready for d/c, they are accruing a bill since insurance no longer paying for her care.  CG v/u, states he will call Queta with [facility].    1:51 PM  CM called Queta with [facility] [phone], informed that PT/OT notes won't be done until tomorrow, so please push back admit until tomorrow.    3:52 PM  PT/OT notes in; CM sent PT/OT notes and MD orders to [facility] via CP.    4:09 PM  Per Queta, pt's family now wants SNF to senior care.  There's no way pt can be admitted today; admit scheduled for tomorrow.    CM documented avoidable day for SNF facility unable to admit.    HALEY AlbertN, BS, RN, CCM

## 2024-01-10 LAB
ALBUMIN SERPL BCP-MCNC: 1.9 G/DL (ref 3.5–5.2)
ALP SERPL-CCNC: 135 U/L (ref 55–135)
ALT SERPL W/O P-5'-P-CCNC: 8 U/L (ref 10–44)
ANION GAP SERPL CALC-SCNC: 8 MMOL/L (ref 8–16)
AST SERPL-CCNC: 20 U/L (ref 10–40)
BASOPHILS # BLD AUTO: 0.02 K/UL (ref 0–0.2)
BASOPHILS NFR BLD: 0.3 % (ref 0–1.9)
BILIRUB SERPL-MCNC: 0.2 MG/DL (ref 0.1–1)
BUN SERPL-MCNC: 17 MG/DL (ref 8–23)
CALCIUM SERPL-MCNC: 8.2 MG/DL (ref 8.7–10.5)
CHLORIDE SERPL-SCNC: 111 MMOL/L (ref 95–110)
CO2 SERPL-SCNC: 21 MMOL/L (ref 23–29)
CREAT SERPL-MCNC: 1.3 MG/DL (ref 0.5–1.4)
DIFFERENTIAL METHOD BLD: ABNORMAL
EOSINOPHIL # BLD AUTO: 0.1 K/UL (ref 0–0.5)
EOSINOPHIL NFR BLD: 1.3 % (ref 0–8)
ERYTHROCYTE [DISTWIDTH] IN BLOOD BY AUTOMATED COUNT: 19.8 % (ref 11.5–14.5)
EST. GFR  (NO RACE VARIABLE): 44 ML/MIN/1.73 M^2
GLUCOSE SERPL-MCNC: 83 MG/DL (ref 70–110)
HCT VFR BLD AUTO: 27.7 % (ref 37–48.5)
HGB BLD-MCNC: 8.3 G/DL (ref 12–16)
IMM GRANULOCYTES # BLD AUTO: 0.1 K/UL (ref 0–0.04)
IMM GRANULOCYTES NFR BLD AUTO: 1.3 % (ref 0–0.5)
LYMPHOCYTES # BLD AUTO: 1.3 K/UL (ref 1–4.8)
LYMPHOCYTES NFR BLD: 17.6 % (ref 18–48)
MCH RBC QN AUTO: 24.3 PG (ref 27–31)
MCHC RBC AUTO-ENTMCNC: 30 G/DL (ref 32–36)
MCV RBC AUTO: 81 FL (ref 82–98)
MONOCYTES # BLD AUTO: 1.1 K/UL (ref 0.3–1)
MONOCYTES NFR BLD: 14.2 % (ref 4–15)
NEUTROPHILS # BLD AUTO: 4.9 K/UL (ref 1.8–7.7)
NEUTROPHILS NFR BLD: 65.3 % (ref 38–73)
NRBC BLD-RTO: 0 /100 WBC
PLATELET # BLD AUTO: 271 K/UL (ref 150–450)
PMV BLD AUTO: 11.3 FL (ref 9.2–12.9)
POTASSIUM SERPL-SCNC: 4.4 MMOL/L (ref 3.5–5.1)
PROT SERPL-MCNC: 5.8 G/DL (ref 6–8.4)
RBC # BLD AUTO: 3.41 M/UL (ref 4–5.4)
SODIUM SERPL-SCNC: 140 MMOL/L (ref 136–145)
WBC # BLD AUTO: 7.54 K/UL (ref 3.9–12.7)

## 2024-01-10 PROCEDURE — 96372 THER/PROPH/DIAG INJ SC/IM: CPT | Performed by: STUDENT IN AN ORGANIZED HEALTH CARE EDUCATION/TRAINING PROGRAM

## 2024-01-10 PROCEDURE — 63600175 PHARM REV CODE 636 W HCPCS: Mod: HCNC | Performed by: STUDENT IN AN ORGANIZED HEALTH CARE EDUCATION/TRAINING PROGRAM

## 2024-01-10 PROCEDURE — 25000003 PHARM REV CODE 250: Mod: HCNC | Performed by: HOSPITALIST

## 2024-01-10 PROCEDURE — 36415 COLL VENOUS BLD VENIPUNCTURE: CPT | Mod: HCNC | Performed by: STUDENT IN AN ORGANIZED HEALTH CARE EDUCATION/TRAINING PROGRAM

## 2024-01-10 PROCEDURE — G0378 HOSPITAL OBSERVATION PER HR: HCPCS | Mod: HCNC

## 2024-01-10 PROCEDURE — 85025 COMPLETE CBC W/AUTO DIFF WBC: CPT | Mod: HCNC | Performed by: HOSPITALIST

## 2024-01-10 PROCEDURE — 25000003 PHARM REV CODE 250: Mod: HCNC | Performed by: STUDENT IN AN ORGANIZED HEALTH CARE EDUCATION/TRAINING PROGRAM

## 2024-01-10 PROCEDURE — 80053 COMPREHEN METABOLIC PANEL: CPT | Mod: HCNC | Performed by: STUDENT IN AN ORGANIZED HEALTH CARE EDUCATION/TRAINING PROGRAM

## 2024-01-10 PROCEDURE — 36415 COLL VENOUS BLD VENIPUNCTURE: CPT | Mod: HCNC,XB | Performed by: HOSPITALIST

## 2024-01-10 RX ORDER — OXYCODONE HYDROCHLORIDE 15 MG/1
15 TABLET ORAL EVERY 6 HOURS PRN
Qty: 15 TABLET | Refills: 0 | Status: SHIPPED | OUTPATIENT
Start: 2024-01-10 | End: 2024-02-09

## 2024-01-10 RX ORDER — CIPROFLOXACIN 750 MG/1
750 TABLET, FILM COATED ORAL EVERY 12 HOURS
Qty: 6 TABLET | Refills: 0
Start: 2024-01-10 | End: 2024-01-12

## 2024-01-10 RX ADMIN — ENOXAPARIN SODIUM 40 MG: 40 INJECTION SUBCUTANEOUS at 04:01

## 2024-01-10 RX ADMIN — Medication 1 CAPSULE: at 08:01

## 2024-01-10 RX ADMIN — Medication 6 MG: at 08:01

## 2024-01-10 RX ADMIN — OXYCODONE HYDROCHLORIDE 15 MG: 10 TABLET ORAL at 06:01

## 2024-01-10 RX ADMIN — METOPROLOL TARTRATE 50 MG: 50 TABLET, FILM COATED ORAL at 08:01

## 2024-01-10 RX ADMIN — POLYETHYLENE GLYCOL 3350 17 G: 17 POWDER, FOR SOLUTION ORAL at 08:01

## 2024-01-10 RX ADMIN — OXYCODONE HYDROCHLORIDE 15 MG: 10 TABLET ORAL at 12:01

## 2024-01-10 RX ADMIN — LOSARTAN POTASSIUM 25 MG: 25 TABLET, FILM COATED ORAL at 08:01

## 2024-01-10 RX ADMIN — LIDOCAINE 5% 1 PATCH: 700 PATCH TOPICAL at 08:01

## 2024-01-10 RX ADMIN — CIPROFLOXACIN 750 MG: 750 TABLET, FILM COATED ORAL at 12:01

## 2024-01-10 NOTE — PLAN OF CARE
Problem: Infection  Goal: Absence of Infection Signs and Symptoms  Outcome: Ongoing, Progressing     Problem: Adult Inpatient Plan of Care  Goal: Plan of Care Review  Outcome: Ongoing, Progressing  Goal: Patient-Specific Goal (Individualized)  Outcome: Ongoing, Progressing  Goal: Absence of Hospital-Acquired Illness or Injury  Outcome: Ongoing, Progressing  Goal: Optimal Comfort and Wellbeing  Outcome: Ongoing, Progressing  Goal: Readiness for Transition of Care  Outcome: Ongoing, Progressing     Problem: Bleeding (Sepsis/Septic Shock)  Goal: Absence of Bleeding  Outcome: Ongoing, Progressing     Problem: Infection Progression (Sepsis/Septic Shock)  Goal: Absence of Infection Signs and Symptoms  Outcome: Ongoing, Progressing     Problem: Fluid and Electrolyte Imbalance (Acute Kidney Injury/Impairment)  Goal: Fluid and Electrolyte Balance  Outcome: Ongoing, Progressing     Problem: Pain Acute  Goal: Acceptable Pain Control and Functional Ability  Outcome: Ongoing, Progressing     Problem: Fall Injury Risk  Goal: Absence of Fall and Fall-Related Injury  Outcome: Ongoing, Progressing

## 2024-01-10 NOTE — PT/OT/SLP PROGRESS
Occupational Therapy      Patient Name:  Patito Domingo   MRN:  [id number]    Patient not seen today secondary to transfer orders filed. Will follow-up 1/11 if pt still in acute setting.    1/10/2024

## 2024-01-10 NOTE — PLAN OF CARE
Problem: Adult Inpatient Plan of Care  Goal: Plan of Care Review  Outcome: Ongoing, Not Progressing  Goal: Optimal Comfort and Wellbeing  Outcome: Ongoing, Not Progressing     Problem: Impaired Wound Healing  Goal: Optimal Wound Healing  Outcome: Ongoing, Not Progressing     Problem: Pain Acute  Goal: Acceptable Pain Control and Functional Ability  Outcome: Ongoing, Not Progressing     AAOx4. Pain management given as needed. MD at bedside during shift and assessed regions of discomfort. No acute issues present.

## 2024-01-10 NOTE — PLAN OF CARE
CM called Queta with [facility] [phone], [facility] requesting call back.    Per CP, Queta has auth to admit pt today, requesting updated orders.  CM messaged Dr. Juan requesting updated orders.    CM spoke with Queta.  She requests the following: MD amend orders to therapy 5x/wk and include dx for cipro; hard script for Oxycodone.  CM notified MD.    3:31 PM  MD updated orders, provided hard script; CM sent updated orders and rx to [facility] via CP.    3:44 PM  CM called Queta, informed that updated orders and hard script have been sent via CP.  Queta states she's waiting for updated auth from [employer], she requested last night and still hasn't received.  Per availity, pt is certified through 1/15/24; Queta states yes, but she's not received the auth from them.  CM emailed Maria Luisa Sorto to request auth be sent to [facility].    4:03 PM  CM called brace line [phone], spoke with Rosalva, requested LSO brace; she states it will be delivered shortly.  Nurse and MD notified.    4:23 PM  CM spoke with Queta, who states they still have not received updated auth from [employer].  Admit will have to be delayed until tomorrow.  Pt notified.    HALEY AlbertN, BS, RN, CCM       Quality 226: Preventive Care And Screening: Tobacco Use: Screening And Cessation Intervention: Patient screened for tobacco and is an ex-smoker Quality 431: Preventive Care And Screening: Unhealthy Alcohol Use - Screening: Patient screened for unhealthy alcohol use using a single question and scores less than 2 times per year Detail Level: Detailed Quality 110: Preventive Care And Screening: Influenza Immunization: Influenza Immunization previously received during influenza season Quality 111:Pneumonia Vaccination Status For Older Adults: Pneumococcal Vaccination Previously Received Quality 130: Documentation Of Current Medications In The Medical Record: Current Medications Documented

## 2024-01-10 NOTE — PLAN OF CARE
Problem: Infection  Goal: Absence of Infection Signs and Symptoms  Outcome: Ongoing, Progressing     Problem: Adult Inpatient Plan of Care  Goal: Plan of Care Review  Outcome: Ongoing, Progressing  Goal: Patient-Specific Goal (Individualized)  Outcome: Ongoing, Progressing  Goal: Absence of Hospital-Acquired Illness or Injury  Outcome: Ongoing, Progressing  Goal: Optimal Comfort and Wellbeing  Outcome: Ongoing, Progressing  Goal: Readiness for Transition of Care  Outcome: Ongoing, Progressing     Problem: Adjustment to Illness (Sepsis/Septic Shock)  Goal: Optimal Coping  Outcome: Ongoing, Progressing     Problem: Bleeding (Sepsis/Septic Shock)  Goal: Absence of Bleeding  Outcome: Ongoing, Progressing     Problem: Glycemic Control Impaired (Sepsis/Septic Shock)  Goal: Blood Glucose Level Within Desired Range  Outcome: Ongoing, Progressing     Problem: Infection Progression (Sepsis/Septic Shock)  Goal: Absence of Infection Signs and Symptoms  Outcome: Ongoing, Progressing     Problem: Nutrition Impaired (Sepsis/Septic Shock)  Goal: Optimal Nutrition Intake  Outcome: Ongoing, Progressing     Problem: Fluid and Electrolyte Imbalance (Acute Kidney Injury/Impairment)  Goal: Fluid and Electrolyte Balance  Outcome: Ongoing, Progressing     Problem: Oral Intake Inadequate (Acute Kidney Injury/Impairment)  Goal: Optimal Nutrition Intake  Outcome: Ongoing, Progressing     Problem: Renal Function Impairment (Acute Kidney Injury/Impairment)  Goal: Effective Renal Function  Outcome: Ongoing, Progressing     Problem: Skin Injury Risk Increased  Goal: Skin Health and Integrity  Outcome: Ongoing, Progressing     Problem: Impaired Wound Healing  Goal: Optimal Wound Healing  Outcome: Ongoing, Progressing     Problem: Fall Injury Risk  Goal: Absence of Fall and Fall-Related Injury  Outcome: Ongoing, Progressing     Problem: Pain Acute  Goal: Acceptable Pain Control and Functional Ability  Outcome: Ongoing, Progressing

## 2024-01-11 VITALS
SYSTOLIC BLOOD PRESSURE: 124 MMHG | HEART RATE: 68 BPM | TEMPERATURE: 98 F | DIASTOLIC BLOOD PRESSURE: 60 MMHG | HEIGHT: 66 IN | OXYGEN SATURATION: 98 % | RESPIRATION RATE: 16 BRPM | BODY MASS INDEX: 25.55 KG/M2 | WEIGHT: 159 LBS

## 2024-01-11 LAB
ALBUMIN SERPL BCP-MCNC: 2 G/DL (ref 3.5–5.2)
ALP SERPL-CCNC: 133 U/L (ref 55–135)
ALT SERPL W/O P-5'-P-CCNC: 10 U/L (ref 10–44)
ANION GAP SERPL CALC-SCNC: 8 MMOL/L (ref 8–16)
AST SERPL-CCNC: 15 U/L (ref 10–40)
BILIRUB SERPL-MCNC: 0.2 MG/DL (ref 0.1–1)
BUN SERPL-MCNC: 20 MG/DL (ref 8–23)
CALCIUM SERPL-MCNC: 8.4 MG/DL (ref 8.7–10.5)
CHLORIDE SERPL-SCNC: 110 MMOL/L (ref 95–110)
CO2 SERPL-SCNC: 23 MMOL/L (ref 23–29)
CREAT SERPL-MCNC: 1.9 MG/DL (ref 0.5–1.4)
ERYTHROCYTE [DISTWIDTH] IN BLOOD BY AUTOMATED COUNT: 19.6 % (ref 11.5–14.5)
EST. GFR  (NO RACE VARIABLE): 27.9 ML/MIN/1.73 M^2
GLUCOSE SERPL-MCNC: 75 MG/DL (ref 70–110)
HCT VFR BLD AUTO: 27.1 % (ref 37–48.5)
HGB BLD-MCNC: 7.7 G/DL (ref 12–16)
MCH RBC QN AUTO: 24.2 PG (ref 27–31)
MCHC RBC AUTO-ENTMCNC: 28.4 G/DL (ref 32–36)
MCV RBC AUTO: 85 FL (ref 82–98)
PLATELET # BLD AUTO: 294 K/UL (ref 150–450)
PMV BLD AUTO: 10.3 FL (ref 9.2–12.9)
POTASSIUM SERPL-SCNC: 3.6 MMOL/L (ref 3.5–5.1)
PROT SERPL-MCNC: 6 G/DL (ref 6–8.4)
RBC # BLD AUTO: 3.18 M/UL (ref 4–5.4)
SODIUM SERPL-SCNC: 141 MMOL/L (ref 136–145)
WBC # BLD AUTO: 5.63 K/UL (ref 3.9–12.7)

## 2024-01-11 PROCEDURE — 63600175 PHARM REV CODE 636 W HCPCS: Mod: HCNC | Performed by: STUDENT IN AN ORGANIZED HEALTH CARE EDUCATION/TRAINING PROGRAM

## 2024-01-11 PROCEDURE — 25000003 PHARM REV CODE 250: Mod: HCNC | Performed by: HOSPITALIST

## 2024-01-11 PROCEDURE — 80053 COMPREHEN METABOLIC PANEL: CPT | Mod: HCNC | Performed by: STUDENT IN AN ORGANIZED HEALTH CARE EDUCATION/TRAINING PROGRAM

## 2024-01-11 PROCEDURE — 85027 COMPLETE CBC AUTOMATED: CPT | Mod: HCNC | Performed by: STUDENT IN AN ORGANIZED HEALTH CARE EDUCATION/TRAINING PROGRAM

## 2024-01-11 PROCEDURE — 97535 SELF CARE MNGMENT TRAINING: CPT | Mod: HCNC

## 2024-01-11 PROCEDURE — 25000003 PHARM REV CODE 250: Mod: HCNC | Performed by: STUDENT IN AN ORGANIZED HEALTH CARE EDUCATION/TRAINING PROGRAM

## 2024-01-11 PROCEDURE — G0378 HOSPITAL OBSERVATION PER HR: HCPCS | Mod: HCNC

## 2024-01-11 PROCEDURE — 97110 THERAPEUTIC EXERCISES: CPT | Mod: HCNC,CQ

## 2024-01-11 PROCEDURE — 36415 COLL VENOUS BLD VENIPUNCTURE: CPT | Mod: HCNC | Performed by: STUDENT IN AN ORGANIZED HEALTH CARE EDUCATION/TRAINING PROGRAM

## 2024-01-11 PROCEDURE — 25000003 PHARM REV CODE 250: Mod: HCNC | Performed by: INTERNAL MEDICINE

## 2024-01-11 PROCEDURE — 96372 THER/PROPH/DIAG INJ SC/IM: CPT | Performed by: STUDENT IN AN ORGANIZED HEALTH CARE EDUCATION/TRAINING PROGRAM

## 2024-01-11 PROCEDURE — 97530 THERAPEUTIC ACTIVITIES: CPT | Mod: HCNC,CQ

## 2024-01-11 RX ORDER — PROCHLORPERAZINE EDISYLATE 5 MG/ML
5 INJECTION INTRAMUSCULAR; INTRAVENOUS EVERY 6 HOURS PRN
Status: DISCONTINUED | OUTPATIENT
Start: 2024-01-11 | End: 2024-01-11 | Stop reason: HOSPADM

## 2024-01-11 RX ORDER — PANTOPRAZOLE SODIUM 40 MG/1
40 TABLET, DELAYED RELEASE ORAL DAILY
Status: DISCONTINUED | OUTPATIENT
Start: 2024-01-11 | End: 2024-01-11 | Stop reason: HOSPADM

## 2024-01-11 RX ORDER — ALUMINUM HYDROXIDE, MAGNESIUM HYDROXIDE, AND SIMETHICONE 2400; 240; 2400 MG/30ML; MG/30ML; MG/30ML
30 SUSPENSION ORAL ONCE
Status: COMPLETED | OUTPATIENT
Start: 2024-01-11 | End: 2024-01-11

## 2024-01-11 RX ADMIN — METOPROLOL TARTRATE 50 MG: 50 TABLET, FILM COATED ORAL at 09:01

## 2024-01-11 RX ADMIN — CIPROFLOXACIN 750 MG: 750 TABLET, FILM COATED ORAL at 12:01

## 2024-01-11 RX ADMIN — Medication 1 CAPSULE: at 08:01

## 2024-01-11 RX ADMIN — ALUMINUM HYDROXIDE, MAGNESIUM HYDROXIDE, AND DIMETHICONE 30 ML: 400; 400; 40 SUSPENSION ORAL at 03:01

## 2024-01-11 RX ADMIN — LIDOCAINE 5% 1 PATCH: 700 PATCH TOPICAL at 08:01

## 2024-01-11 RX ADMIN — METOPROLOL TARTRATE 50 MG: 50 TABLET, FILM COATED ORAL at 08:01

## 2024-01-11 RX ADMIN — ENOXAPARIN SODIUM 40 MG: 40 INJECTION SUBCUTANEOUS at 04:01

## 2024-01-11 RX ADMIN — OXYCODONE HYDROCHLORIDE 15 MG: 10 TABLET ORAL at 09:01

## 2024-01-11 RX ADMIN — OXYCODONE HYDROCHLORIDE 15 MG: 10 TABLET ORAL at 12:01

## 2024-01-11 RX ADMIN — OXYCODONE HYDROCHLORIDE 15 MG: 10 TABLET ORAL at 05:01

## 2024-01-11 RX ADMIN — PANTOPRAZOLE SODIUM 40 MG: 40 TABLET, DELAYED RELEASE ORAL at 09:01

## 2024-01-11 RX ADMIN — PROCHLORPERAZINE EDISYLATE 5 MG: 5 INJECTION INTRAMUSCULAR; INTRAVENOUS at 01:01

## 2024-01-11 RX ADMIN — CIPROFLOXACIN 750 MG: 750 TABLET, FILM COATED ORAL at 11:01

## 2024-01-11 RX ADMIN — OXYCODONE HYDROCHLORIDE 15 MG: 10 TABLET ORAL at 11:01

## 2024-01-11 RX ADMIN — LOSARTAN POTASSIUM 25 MG: 25 TABLET, FILM COATED ORAL at 08:01

## 2024-01-11 NOTE — SUBJECTIVE & OBJECTIVE
Interval History:  Afebrile.  No acute issues noted overnight.  Patient denies any chest pain or shortness a breath.  Still pending placement.    Review of Systems  Objective:     Vital Signs (Most Recent):  Temp: 97.9 °F (36.6 °C) (01/10/24 1730)  Pulse: 65 (01/10/24 1730)  Resp: 18 (01/10/24 1730)  BP: 136/63 (01/10/24 1730)  SpO2: 100 % (01/10/24 1730) Vital Signs (24h Range):  Temp:  [97.4 °F (36.3 °C)-98.9 °F (37.2 °C)] 97.9 °F (36.6 °C)  Pulse:  [58-69] 65  Resp:  [16-19] 18  SpO2:  [99 %-100 %] 100 %  BP: (115-143)/(56-73) 136/63     Weight: 72.1 kg (159 lb)  Body mass index is 25.66 kg/m².    Intake/Output Summary (Last 24 hours) at 1/10/2024 1824  Last data filed at 1/10/2024 1758  Gross per 24 hour   Intake 410 ml   Output --   Net 410 ml         Physical Exam      Clear lungs bilaterally, unlabored breathing, on room air, no cyanosis  Heart sounds indicate a regular rate and rhythm  Awake alert, no acute distress   No obvious lower extremity edema  No facial droop, no slurred speech   Abdomen nondistended

## 2024-01-11 NOTE — PLAN OF CARE
MAXIME spoke with Queta at [facility] [phone].  She states she still hasn't rec'd auth from [employer] despite repeated requests.  MAXIME has also requested that auth be sent to [facility].  They need updated orders dated today.  MAXIME requested orders from Dr. Alcantar.  MAXIME escalated auth issue to leadership Coco.  MAXIME documented avoidable day in Epic.    11:08 AM  Per Queta, [facility] still hasn't received auth.  Their fax number is [phone], she states she's had difficulty receiving some faxes.  She requests that [facility] send auth via email to [email or web address].  CM emailed leadership with this request.    MAXIME emailed request with the above contact information to Maribel at [facility], requesting that auth be sent to fax and email.    11:45 AM  Per Queta, she has rec'd auth.  CM sent updated SNF orders via CP.  Queta notified by phone.    12:24 PM  Per Queta, MD orders are not updated to today's date.  MAXIME requested Dr. Alcantar to update.    MAXIME requested MD to amend orders to regular or cardiac diet, no boost, labs with number to send or removed, wd care with orders in each area.  Dr. Alcantar amended orders and MAXIME sent to [facility] via CP.    Per Queta, have nurse call report to room 117A, [phone].  Nurse notified.  MAXIME ordered w/c van transport for 3:45 PM.    4:15 PM  MAXIME called son Dallas Domingo [phone] to inform pt being transported to [facility] this evening.  CG v/u.  Pt notified that w/c van will be coming by to pick her up in a couple of hours.  Pt v/u.    Princess Roberts, BSN, BS, RN, CCM

## 2024-01-11 NOTE — PT/OT/SLP PROGRESS
"Occupational Therapy   Treatment    Name: Patito Domingo  MRN: [id number]  Admitting Diagnosis:  FLORECITA (acute kidney injury)       Recommendations:     Discharge Recommendations: Moderate Intensity Therapy  Discharge Equipment Recommendations:  to be determined by next level of care  Barriers to discharge:       Assessment:     Patito Domingo is a 73 y.o. female with a medical diagnosis of FLORECITA (acute kidney injury).  She presents with weakness. Performance deficits affecting function are weakness, impaired endurance, impaired self care skills, impaired functional mobility, gait instability, impaired cognition, decreased coordination, decreased upper extremity function, decreased safety awareness, impaired cardiopulmonary response to activity. Pt was found calling for help to be t/f from BSC back to bed.     Rehab Prognosis:  Fair; patient would benefit from acute skilled OT services to address these deficits and reach maximum level of function.       Plan:     Patient to be seen 3 x/week to address the above listed problems via self-care/home management, therapeutic activities, therapeutic exercises  Plan of Care Expires: 02/09/24  Plan of Care Reviewed with: patient    Subjective     Chief Complaint: "not too rough"  Patient/Family Comments/goals: return to  [facility]  Pain/Comfort:  Pain Rating 1: 0/10    Objective:     Communicated with: Nsg prior to session.  Patient found  toileting on BSC asking for help  with telemetry upon OT entry to room; PCT present to assist.    General Precautions: Standard, fall, contact    Orthopedic Precautions:N/A  Braces: LSO  Respiratory Status: Room air     Occupational Performance:     Bed Mobility:    Patient completed Rolling/Turning to Left with  moderate assistance  Patient completed Rolling/Turning to Right with moderate assistance  Patient completed Sit to Supine with maximal assistance     Functional Mobility/Transfers:  Patient completed BSC > Bed Transfer using Squat Pivot " technique with moderate assistance and of 2 persons with hand-held assist      Activities of Daily Living:  Grooming: setup A wash face with warm wash cloth in bed  Toileting: total assistance perianal care      [facility] 6 Click ADL: 12    Treatment & Education:  Pt educated on role and purpose of therapy  Pt educated on goal setting  Pt educated on benefits of OOB activity  Pt educated on self advocacy     Patient left supine with all lines intact, call button in reach, and nsg notified    GOALS:   Multidisciplinary Problems       Occupational Therapy Goals          Problem: Occupational Therapy    Goal Priority Disciplines Outcome Interventions   Occupational Therapy Goal     OT, PT/OT Ongoing, Progressing    Description: Goals to be met by: 2/9/24     Patient will increase functional independence with ADLs by performing:    UE Dressing with Minimal Assistance.  LE Dressing with Moderate Assistance.  Grooming while EOB with Minimal Assistance.  Toileting from bedside commode with Moderate Assistance for hygiene and clothing management.   Sitting at edge of bed x10 minutes with Stand-by Assistance.  Rolling to Bilateral with Stand-by Assistance.   Supine to sit with Minimal Assistance.  Squat pivot transfers with Moderate Assistance.  Toilet transfer to bedside commode with Moderate Assistance using squat pivot transfer  Upper extremity exercise program x10 reps per handout, with assistance as needed.                         Time Tracking:     OT Date of Treatment: 01/11/24  OT Start Time: 1039  OT Stop Time: 1053  OT Total Time (min): 14 min    Billable Minutes:Self Care/Home Management 14    OT/HERO: OT          1/11/2024

## 2024-01-11 NOTE — PT/OT/SLP PROGRESS
"Physical Therapy Treatment    Patient Name:  Patito Domingo   MRN:  [id number]    Recommendations:     Discharge Recommendations: Moderate Intensity Therapy  Discharge Equipment Recommendations: to be determined by next level of care  Barriers to discharge: Decreased caregiver support    Assessment:     Patito Domingo is a 73 y.o. female admitted with a medical diagnosis of FLORECITA (acute kidney injury).  She presents with the following impairments/functional limitations: weakness, impaired endurance, impaired self care skills, impaired functional mobility, decreased lower extremity function, decreased upper extremity function, decreased coordination, impaired cognition, decreased safety awareness, impaired cardiopulmonary response to activity Pt tolerated treatment session fairly well today. Pt expresses fear of falling, anxiety limiting session. Patient remains appropriate for continued skilled services within the acute environment and goals remain appropriate.   .    Rehab Prognosis: Good; patient would benefit from acute skilled PT services to address these deficits and reach maximum level of function.    Recent Surgery: * No surgery found *      Plan:     During this hospitalization, patient to be seen 3 x/week to address the identified rehab impairments via gait training, therapeutic activities, therapeutic exercises, neuromuscular re-education and progress toward the following goals:    Plan of Care Expires:  02/09/24    Subjective     Chief Complaint: Fear of falling   Patient/Family Comments/goals: "I'm weak."  Pain/Comfort:  Pain Rating 1: 0/10      Objective:     Communicated with RN prior to session.  Patient found sitting edge of bed with RN present with telemetry upon PT entry to room.     General Precautions: Standard, fall  Orthopedic Precautions: N/A  Braces: LSO (Per MD (Odalis Alcantar) note 1/11 wear LSO when out of bed)  Respiratory Status: Room air     Functional Mobility:  Bed Mobility:     Rolling " Right: ModA  Rolling Left: ModA  Scooting: minimum assistance  EOB sitting CGA/Poli. LSO donned  Transfers:     Sit to Stand:  maximal assistance with rolling walker  Pt stood ~ 5 seconds MaxA with RW   Pt stated fear of falling   Pt performed 10 repetitions of A/AAROM seated B LE exercises consisting of: Marching, LAQ  Pt performed 10 repetitions of supine B LE exercises consisting of: QS, GS, AP      AM-PAC 6 CLICK MOBILITY  Turning over in bed (including adjusting bedclothes, sheets and blankets)?: 3  Sitting down on and standing up from a chair with arms (e.g., wheelchair, bedside commode, etc.): 2  Moving from lying on back to sitting on the side of the bed?: 2  Moving to and from a bed to a chair (including a wheelchair)?: 2  Need to walk in hospital room?: 1  Climbing 3-5 steps with a railing?: 1  Basic Mobility Total Score: 11       Treatment & Education:  Therapist provided instruction and educated for safety during transfers and gait training. As well as proper body mechanics, energy conservation, and fall prevention strategies during tasks listed above, and the effects of prolonged immobility and the importance of performing EOB/OOB activity and exercises to promote healing and reduce recovery time.       Patient left left sidelying with all lines intact, call button in reach, and RN notified..    GOALS:   Multidisciplinary Problems       Physical Therapy Goals          Problem: Physical Therapy    Goal Priority Disciplines Outcome Goal Variances Interventions   Physical Therapy Goal     PT, PT/OT Ongoing, Progressing     Description: Goals to be met by: 24     Patient will increase functional independence with mobility by performin. Supine to sit with Contact Guard Assistance  2. Sit to supine with Contact Guard Assistance  3. Rolling to Left and Right with Stand-by Assistance.  4. Sit to stand transfer with Moderate Assistance  5. Bed to chair transfer with Moderate Assistance using LRAD  6.  Gait  x 10 feet with Moderate Assistance using LRAD.   7. Wheelchair propulsion x100 feet with Contact Guard Assistance using bilateral uppper extremities  8. Lower extremity exercise program x30 reps per handout, with independence  9. Sit edge of bed for 15 minutes with Supervision                           Time Tracking:     PT Received On: 01/11/24  PT Start Time: 1326     PT Stop Time: 1356  PT Total Time (min): 30 min     Billable Minutes: Therapeutic Activity 15 and Therapeutic Exercise 15    Treatment Type: Treatment  PT/PTA: PTA     Number of PTA visits since last PT visit: 1 01/11/2024

## 2024-01-11 NOTE — PROGRESS NOTES
"[facility] ([address] Medicine  Progress Note    Patient Name: Patito Domingo  MRN: [id number]  Patient Class: OP- Observation   Admission Date: 1/3/2024  Length of Stay: 0 days  Attending Physician: Norman Juan MD  Primary Care Provider: Mariela Wei DO        Subjective:     Principal Problem:FLORECITA (acute kidney injury)        HPI:  Patito Domingo is a 73 y.o. female with metastatic carcinoid tumor, history of CVA with debility, nephrolithiasis with history of ESBL/VRE UTI, anemia, diastolic CHF, chronic pain who presents today from [facility] for refusal to eat or drink.     Patient states that she "is a little confused" but endorses lower-mid abdominal pain of a sharp nature that has been present for about a day or so. She also endorses dysuria. Denies n/v, fever, or diarrhea, however chart review shows that she has chronic diarrhea. She can only tell me that she takes Oxycodone, which helps with the pain. She endorses chronic back pain which has not changed recently.     Per the ED on discussion with her nursing home, she was sent her for refusal to eat or drink for the past couple of days, which is new for her.     Overview/Hospital Course:  Patient presented with refusal to eat,   Ertapenem started empirically for UTI given hx of ESBL. FLORECITA resolved w/ supportive measures. Tolerating po now. ID recommendations appreciated - pt switched to cipro for pan sensitive ecoli. .        Interval History:  Afebrile.  No acute issues noted overnight.  Patient denies any chest pain or shortness a breath.  Still pending placement.    Review of Systems  Objective:     Vital Signs (Most Recent):  Temp: 97.9 °F (36.6 °C) (01/10/24 1730)  Pulse: 65 (01/10/24 1730)  Resp: 18 (01/10/24 1730)  BP: 136/63 (01/10/24 1730)  SpO2: 100 % (01/10/24 1730) Vital Signs (24h Range):  Temp:  [97.4 °F (36.3 °C)-98.9 °F (37.2 °C)] 97.9 °F (36.6 °C)  Pulse:  [58-69] 65  Resp:  [16-19] " 18  SpO2:  [99 %-100 %] 100 %  BP: (115-143)/(56-73) 136/63     Weight: 72.1 kg (159 lb)  Body mass index is 25.66 kg/m².    Intake/Output Summary (Last 24 hours) at 1/10/2024 1824  Last data filed at 1/10/2024 1758  Gross per 24 hour   Intake 410 ml   Output --   Net 410 ml         Physical Exam      Clear lungs bilaterally, unlabored breathing, on room air, no cyanosis  Heart sounds indicate a regular rate and rhythm  Awake alert, no acute distress   No obvious lower extremity edema  No facial droop, no slurred speech   Abdomen nondistended       Assessment/Plan:      * FLORECITA (acute kidney injury)  likely 2/2 dehydration  Resolved;       Dermatitis  2/2 contact from BM  Wound consult appreciated w/ orders  Barrier cream      Acute cystitis without hematuria  Ucx showing pansesntivie c.rajan.   Cipro per ID thru 1/12/24    Abnormal CT scan, lumbar spine  CT abdomen and pelvis shows compression deformity of the anterior superior aspect of L5 at least 6 weeks old per care everywhere  -LS brace  -outpt spine surgery f/u      Other constipation  Severe constipation noted on abdominal CT, likely contributing to her abdominal pain and possible UTI.  This is secondary to debility and chronic opioid use.  We will start a bowel regimen and give an enema.      Chronic diastolic (congestive) heart failure  Stable  Resume home losartan    Elevated troponin  Troponin elevated without EKG changes, and no complaints of chest pain.  Suspect this is due to renal insufficiency and volume depletion.  Continue home beta-blocker.      Nephrolithiasis  Chronic, and noted on CT.  No acute issues.  Monitor.      Chronic pain syndrome  Cautiously continue home pain regimen in the setting of altered mental status and constipation.       Debility  Due to multiple comorbidities and history of CVA. Continue PT/OT upon discharge back to facility.     Metastatic carcinoid tumor  Known metastatic carcinoid with progressive mets on CT today. Likely  contributing to her chronic debility. Continue outpatient follow up with [facility].         VTE Risk Mitigation (From admission, onward)           Ordered     enoxaparin injection 40 mg  Every 24 hours         01/03/24 2007     IP VTE HIGH RISK PATIENT  Once         01/03/24 2007     Place sequential compression device  Until discontinued         01/03/24 2007                    Discharge Planning   JULIO C: 1/10/2024     Code Status: Full Code   Is the patient medically ready for discharge?: Yes    Reason for patient still in hospital (select all that apply): Pending disposition  Discharge Plan A: Return to nursing home (Return to SNF ([facility]))                  Norman Juan MD  Department of Hospital Medicine   [facility] - [facility] ([place])

## 2024-01-12 NOTE — DISCHARGE SUMMARY
Discharge Summary  Hospital Medicine    Attending Provider on Discharge: Odalis Alcantar MD  [facility] Medicine Team: [facility]  Date of Admission:  1/3/2024     Date of Discharge:  01/11/2024  Code status: Full Code    Active Hospital Problems    Diagnosis  POA    *FLORECITA (acute kidney injury) [N17.9]  Yes    Irritant contact dermatitis due to fecal, urinary or dual incontinence [L24.A2]  Yes    Dermatitis [L30.9]  No    Other constipation [K59.09]  Yes     Chronic    Abnormal CT scan, lumbar spine [R93.7]  Yes    Acute cystitis without hematuria [N30.00]  Yes    Chronic diastolic (congestive) heart failure [I50.32]  Yes     Chronic    Elevated troponin [R79.89]  Yes    Nephrolithiasis [N20.0]  Yes    Chronic pain syndrome [G89.4]  Yes     Chronic    Debility [R53.81]  Yes     Chronic     Will make referral for hh ot pt eval and treat       Metastatic carcinoid tumor [C7B.00]  Yes     Chronic     Cont per heme / onc         Resolved Hospital Problems    Diagnosis Date Resolved POA    Moderate protein-calorie malnutrition [E44.0] 01/07/2024 Yes     Chronic     Recommendations     1. Continue regular diet.   2. Add Boost Glucose Control TID.     Goals: 1. Pt's intake meals >50% by RD follow up.  Nutrition Goal Status: new  Communication of RD Recs: reviewed with physician     Assessment and Plan     Nutrition Problem:  Moderate Protein-Calorie Malnutrition  Malnutrition in the context of Acute Illness     Related to (etiology):  Poor po intake in setting of acute UTI     Signs and Symptoms (as evidenced by):  Energy Intake: <50% of estimated energy requirement for 1-2 weeks     Muscle Mass Depletion: mild depletion of temples, calves  Weight Loss: 9.3% x 1 month     Interventions(treatment strategy):  Collaboration of care with providers.  General healthy diet.  Commercial beverage: Boost Glucose Control TID     Nutrition Diagnosis Status:  New         Formatting of this note is different from the  "original.  Formatting of this note is different from the original.  Recommendations     1. Continue regular diet.   2. Add Boost Glucose Control TID.     Goals: 1. Pt's intake meals >50% by RD follow up.  Nutrition Goal Status: new  Communication of RD Recs: reviewed with physician     Assessment and Plan     Nutrition Problem:  Moderate Protein-Calorie Malnutrition  Malnutrition in the context of Acute Illness     Related to (etiology):  Poor po intake in setting of acute UTI     Signs and Symptoms (as evidenced by):  Energy Intake: <50% of estimated energy requirement for 1-2 weeks     Muscle Mass Depletion: mild depletion of temples, calves  Weight Loss: 9.3% x 1 month     Interventions(treatment strategy):  Collaboration of care with providers.  General healthy diet.  Commercial beverage: Boost Glucose Control TID     Nutrition Diagnosis Status:  New    Last Assessment & Plan:   Formatting of this note might be different from the original.  Boost Glucose Control TID.   Continue regular diet.      Anemia of chronic disease [D63.8] 01/07/2024 Yes     Chronic     Formatting of this note might be different from the original.  Last Assessment & Plan:   The patient's H/H is stable and consistent with previous laboratory measurements, and the patient exhibits no signs or symptoms of acute bleeding; there is no indication for transfusion.  Will continue to monitor.       JENNY Domingo is a 73 y.o. female with metastatic carcinoid tumor, history of CVA with debility, nephrolithiasis with history of ESBL/VRE UTI, anemia, diastolic CHF, chronic pain who presents today from [facility] for refusal to eat or drink.     Patient states that she "is a little confused" but endorses lower-mid abdominal pain of a sharp nature that has been present for about a day or so. She also endorses dysuria. Denies n/v, fever, or diarrhea, however chart review shows that she has chronic diarrhea. She can only tell me that " she takes Oxycodone, which helps with the pain. She endorses chronic back pain which has not changed recently.     Per the ED on discussion with her nursing home, she was sent her for refusal to eat or drink for the past couple of days, which is new for her.     Hospital Course  Patient presented with refusal to eat,   Ertapenem started empirically for UTI given hx of ESBL. FLORECITA resolved w/ supportive measures. Tolerating po now. ID recommendations appreciated - pt switched to cipro for pan sensitive E coli. She started to eat, but complains of some dysphagia.    * FLORECITA (acute kidney injury)  likely 2/2 dehydration  Resolved;       Dermatitis  2/2 contact from BM  Wound consult appreciated w/ orders  Barrier cream      Acute cystitis without hematuria  Ucx showing pansesntivie c.rajan.   Cipro per ID thru 1/12/24    Abnormal CT scan, lumbar spine  CT abdomen and pelvis shows compression deformity of the anterior superior aspect of L5 at least 6 weeks old per care everywhere  -LS brace  -outpt spine surgery f/u      Other constipation  Severe constipation noted on abdominal CT, likely contributing to her abdominal pain and possible UTI.  This is secondary to debility and chronic opioid use.  We will start a bowel regimen and give an enema.      Chronic diastolic (congestive) heart failure  Stable  Resume home losartan    Elevated troponin  Troponin elevated without EKG changes, and no complaints of chest pain.  Suspect this is due to renal insufficiency and volume depletion.  Continue home beta-blocker.      Nephrolithiasis  Chronic, and noted on CT.  No acute issues.  Monitor.      Chronic pain syndrome  Cautiously continue home pain regimen in the setting of altered mental status and constipation.       Debility  Due to multiple comorbidities and history of CVA. Continue PT/OT upon discharge back to facility.     Metastatic carcinoid tumor  Known metastatic carcinoid with progressive mets on CT today. Likely  contributing to her chronic debility. Continue outpatient follow up with Heme Onc.       Procedures: none    Consultants: infectious disease    Current Discharge Medication List        START taking these medications    Details   ciprofloxacin HCl (CIPRO) 750 MG tablet Take 1 tablet (750 mg total) by mouth every 12 (twelve) hours. Dx: UTI for 2 days  Qty: 6 tablet, Refills: 0           CONTINUE these medications which have CHANGED    Details   losartan (COZAAR) 25 MG tablet Take 1 tablet (25 mg total) by mouth once daily.  Qty: 90 tablet, Refills: 3    Comments: .      oxyCODONE (ROXICODONE) 15 MG Tab Take 1 tablet (15 mg total) by mouth every 6 (six) hours as needed for Pain.  Qty: 15 tablet, Refills: 0    Comments: Quantity prescribed more than 7 day supply? Yes, quantity medically necessary  Associated Diagnoses: Metastatic carcinoid tumor      polyethylene glycol (GLYCOLAX) 17 gram PwPk Take 17 g by mouth 2 (two) times daily.  Refills: 0           CONTINUE these medications which have NOT CHANGED    Details   acetaminophen (TYLENOL) 325 MG tablet Take 650 mg by mouth every 6 (six) hours.      DAILY PROBIOTIC, S. BOULARDII, 250 mg capsule Take 250 mg by mouth once daily.      metoprolol tartrate (LOPRESSOR) 50 MG tablet Take 50 mg by mouth 2 (two) times daily.      multivitamin (THERAGRAN) per tablet Take 1 tablet by mouth once daily.           STOP taking these medications       amLODIPine (NORVASC) 10 MG tablet Comments:   Reason for Stopping:         diphenoxylate-atropine 2.5-0.025 mg (LOMOTIL) 2.5-0.025 mg per tablet Comments:   Reason for Stopping:         potassium chloride SA (K-DUR,KLOR-CON) 20 MEQ tablet Comments:   Reason for Stopping:         senna (SENOKOT) 8.6 mg tablet Comments:   Reason for Stopping:         water Liqd 150 mL with MILK OF MAGNESIA 400 mg/5 mL Susp 400 mg, diphenhydrAMINE 12.5 mg/5 mL Elix 60 mg, nystatin 100,000 unit/mL Susp 500,000 Units Comments:   Reason for Stopping:                Discharge Diet:regular diet     Activity: activity as tolerated    Discharge Condition: Good    Disposition: Home or Self Care    Tests pending at the time of discharge: none      Time spent  on the discharge of the patient including review of hospital course with the patient. reviewing discharge medications and arranging follow-up care 35 min    Discharge examination Patient was seen and examined on the date of discharge and determined to be suitable for discharge.    Discharge plan     Future Appointments   Date Time Provider Department Center   1/23/2024 11:20 AM [facility]   2/1/2024  8:00 AM Kristen Ferrer NP [facility] [place]       Odalis Alcanatr MD

## 2024-01-12 NOTE — PLAN OF CARE
[facility] Med [facility] ([facility])  Discharge Final Note    Primary Care Provider: Mariela Wei DO    Expected Discharge Date: 1/11/2024    Final Discharge Note (most recent)       Final Note - 01/12/24 0828          Final Note    Assessment Type Final Discharge Note (P)      Anticipated Discharge Disposition Skilled Nursing Facility (P)         Post-Acute Status    Post-Acute Authorization Placement (P)      Post-Acute Placement Status Set-up Complete/Auth obtained (P)      Coverage [employer] (P)      Discharge Delays None known at this time (P)                      Important Message from Medicare             Contact Info       Mariela Wei DO   Specialty: Family Medicine   Relationship: PCP - General    [id number] [place] [address]   Phone: [phone]       Next Steps: Follow up in 2 week(s)    Gonzales Bishop MD   Specialty: Hematology and Oncology   Relationship: PCP - Hematology/Oncology    [address]  [address]  [facility] [address]   Phone: [phone]       Next Steps: Follow up    [facility] [address]   Specialty: Gastroenterology    [address]  [facility] [address]   Phone: [phone]       Next Steps: Schedule an appointment as soon as possible for a visit    Instructions: dysphagia        Pt d/c'd to [facility].    Princess Roberts, HALEYN, BS, RN, CCM

## 2024-01-25 NOTE — PROGRESS NOTES
Called [phone]  Spoke w/ pt's daughter Aracely and she informed me this was her phone number and not her mothers. I informed Aracely I would fix this issue.     Called [phone] (home) [phone] (work)  [facility] to call office.    Neuroendocrine Surgery Progress Note  Admit Date: 4/30/2022      S:  NAEO   Pain better this morning  No growth on urine cx  Denies hematuria  Eating well without issue  Ambulated in room     O:  Vitals:   Temp:  [97.9 °F (36.6 °C)-98.4 °F (36.9 °C)]   Pulse:  [53-79]   Resp:  [15-18]   BP: (124-173)/(63-75)   SpO2:  [97 %-100 %]     Diet Adult Regular (IDDSI Level 7)   Intake/Output Summary (Last 24 hours) at 5/3/2022 0918  Last data filed at 5/3/2022 0011  Gross per 24 hour   Intake --   Output 800 ml   Net -800 ml            Physical Exam:  Gen: NAD, AAOx3  HEENT: EOMI, NCAT  CV: RR  Resp: no shortness of breath, normal WOB  Abd: soft, non-distended, NT  Ext: no edema      Labs:  Recent Labs     04/30/22  1637 05/01/22  0718   WBC 5.08 4.01   HGB 9.5* 9.9*   HCT 30.5* 32.0*    191    140   K 4.1 4.2    109   CO2 20* 21*   BUN 17 14   CREATININE 1.0 0.9   BILITOT 0.9 0.9   AST 15 12   ALT 10 6*   ALKPHOS 105 103   CALCIUM 9.8 9.5   ALBUMIN 3.4* 3.1*   PROT 7.2 6.9   MG 1.9  --      Scheduled Meds: amLODIPine, 5 mg, Daily  atorvastatin, 40 mg, QHS  enoxaparin, 40 mg, Daily  iron-vit c-b12-folic acid, 1 tablet, Daily  LIDOcaine, 1 patch, Q24H  LIDOcaine, 1 patch, Q24H  losartan, 100 mg, Daily  meropenem (MERREM) IVPB, 1 g, Q8H  metoprolol tartrate, 25 mg, BID  sodium chloride 0.9%, 10 mL, Q6H       dextrose 5 % and 0.9 % NaCl with KCl 20 mEq 50 mL/hr at 05/03/22 0406       A/P:  70 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 Now w/ pneumobilia likely related to sphincterotomy    -Continue pain control   -Regular diet  -Home medications  -no intervention per urology, 1 month fu  -fu cx, on donna Kiser MD  [employer] General Surgery PGY4

## 2024-02-04 ENCOUNTER — TELEPHONE (OUTPATIENT)
Dept: HOME HEALTH SERVICES | Facility: CLINIC | Age: 74
End: 2024-02-04
Payer: MEDICARE

## 2024-02-04 NOTE — TELEPHONE ENCOUNTER
1/31/2024    Patient on Palliative Care NP's schedule tomorrow. Called patient to setup time for appointment but there was no answer; left message on voicemail and requested return call.        Message Sent to  Jessica Chávez to reschedule patient in next 2-3 weeks.    Kristen Ferrer DNP, FNP-C  [email or web address]  [phone]

## 2024-05-10 NOTE — PROGRESS NOTES
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 coli UTI + bacteremia on cx from 7/2. On Day 3 of CTX, 7/4 blood cx NGTD. Patient afebrile initially but with intermittent low-grade fevers to 100.5 over past 48 hours. ID consulted for persistent fever despite abx.     - presence of large bilateral renal stones complicates picture, although non-obstructive  - bilateral DVT US is negative  - await repeat blood cx -ngtd  - continue CTX 1g IV q 24H - will need 2 weeks from negative cx - start date is 7/2  - possible that fever is related to carcinoid             Thank you for your consult. I will follow-up with patient. Please contact us if you have any additional questions.    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 Had been prescribed Keflex the day prior in the outpatient setting for UTI.      In the ED, patient was noted to be somnolent and ill-appearing. Workup was  significant for +UA (> 100 WBC, many bacteria, 3+ LE) leukocytosis (WBC 22.03), lactic acidosis, FLORECITA, elevated troponin. CT abd/pelvis with no acute findings; unchanged size of carcinoid tumor with liver mets and lymphadenopathy. Started on meropenem for UTI due to recent urine culture growing ESBL klebsiella (5/15/22, 3/27/22, 2/2/22). Mental status subsequently improved. Patient admitted to hospital medicine service for further management.      Both 7/7/22 urine and 1/2 blood cx growing E. coli (fluoroquinolone resistant). Blood cx have since cleared and patient transitioned to CTX this AM. Patient afebrile until night of 7/5 when she spiked a temp to 100.4 F. This AM she had another temperature of 100.5 F, so ID was consulted for persistent fever despite abx therapy.       Of note, patient has had multiple admissions this year alone for recurrent UTIs. She has known bilateral nephrolithiasis. CTAP this admission again revealed non-obstructing renal calculi, including large calculus in lower pole of the right kidney, w/o hydronephrosis. Has been seen by Urology but decided against stone retrieval due risks of percutaneous nephrolithotomy, and stones not clearly causing UTIs. She was started on methenamine and Vitamin C for UTI ppx in June    Interval History: No events. Slight improvement in fever curve    Review of Systems   Constitutional:  Positive for activity change. Negative for appetite change, chills, diaphoresis, fatigue and fever.   HENT:  Negative for congestion, postnasal drip, rhinorrhea, sinus pressure, sinus pain and sneezing.    Respiratory:  Negative for cough, chest tightness, shortness of breath, wheezing and stridor.    Cardiovascular:  Negative for chest pain, palpitations and leg swelling.   Gastrointestinal:  Negative for abdominal distention, abdominal pain, blood in stool, constipation, diarrhea, nausea and vomiting.   Endocrine: Negative for cold intolerance and heat intolerance.    Genitourinary:  Positive for dysuria and frequency. Negative for flank pain, hematuria and urgency.   Musculoskeletal:  Negative for gait problem.   Neurological:  Negative for dizziness and weakness.   Psychiatric/Behavioral:  Negative for agitation and behavioral problems.    All other systems reviewed and are negative.  Objective:     Vital Signs (Most Recent):  Temp: 98.4 °F (36.9 °C) (07/10/22 0729)  Pulse: 91 (07/10/22 0729)  Resp: 20 (07/10/22 0729)  BP: 137/65 (07/10/22 0729)  SpO2: 96 % (07/10/22 0853)   Vital Signs (24h Range):  Temp:  [9.6 °F (-12.4 °C)-100.9 °F (38.3 °C)] 98.4 °F (36.9 °C)  Pulse:  [] 91  Resp:  [17-20] 20  SpO2:  [95 %-98 %] 96 %  BP: (137-191)/(63-74) 137/65     Weight: 84.2 kg (185 lb 10 oz)  Body mass index is 29.96 kg/m².    Estimated Creatinine Clearance: 64.5 mL/min (based on SCr of 0.9 mg/dL).    Physical Exam  Constitutional:       General: She is not in acute distress.     Appearance: She is well-developed. She is not ill-appearing or toxic-appearing.   HENT:      Head: Normocephalic and atraumatic.   Neck:      Vascular: No JVD.   Cardiovascular:      Rate and Rhythm: Tachycardia present.   Pulmonary:      Effort: Pulmonary effort is normal. No respiratory distress.   Abdominal:      Tenderness: Tenderness: diffuse.   Musculoskeletal:         General: Normal range of motion.      Cervical back: Normal range of motion and neck supple.   Neurological:      General: No focal deficit present.      Mental Status: She is alert and oriented to person, place, and time. Mental status is at baseline.      Cranial Nerves: No cranial nerve deficit.   Psychiatric:         Mood and Affect: Mood normal.         Behavior: Behavior normal.       Significant Labs: All pertinent labs within the past 24 hours have been reviewed.    Significant Imaging: I have reviewed all pertinent imaging results/findings within the past 24 hours.     [Follow-Up] : a follow-up visit [Shortness of Breath] : shortness of breath

## 2024-06-19 NOTE — TELEPHONE ENCOUNTER
"Pt notified, per Dr. Perry, unable to approve pain medication request.  Pt states "I need it to have test on tomorrow". Pt notified unable to approve.  Pt acknowledged understanding.   "
left ankle screws/yes(specify)

## 2024-07-18 NOTE — ED NOTES
Admit bed requested  
Iv fluids in progress per iv pump--- less than desired dose due to size of iv catheter and location  
Lab called to add a gram stain and culture on cath urine in lab--- spoke to michell  
[facility]  
Patient has 2 liters normal saline infusing per iv pump; awaiting a ready bed; will continue to monitor this patient; pt is asleep  
Patient offered tylenol as ordered for her pain and refusing tylenol and reports needs something stronger for her back pain and beginning to cry; pt reassured and will call md to inform  
Patient refusing to try the tylenol and md at bedside and aware  
Phlebotomy here to draw labs  
Physician at bedside.    
Physician at bedside.-- md at bedside and discussing pts pain and med for pain    
Plan is to admit to observation  
Pt assisted to bedpan  
SPOKE ZOË WITH LAB--REQUESTED A PHLEBOTOMIST FOR BLOOD DRAW. STATES WILL SEND SOMEONE. [place], PRIMARY NURSE AWARE.  
mother

## 2024-10-03 NOTE — SUBJECTIVE & OBJECTIVE
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)        Past Surgical History:   Procedure Laterality Date    ABDOMINAL SURGERY      CATARACT EXTRACTION Left 10/2017     SECTION      CHOLECYSTECTOMY      COLON SURGERY      cystoscope      CYSTOSCOPY W/ RETROGRADES Right 10/10/2019    Procedure: CYSTOSCOPY, WITH RETROGRADE PYELOGRAM;  Surgeon: Gen Isbell MD;  Location: [facility] OR;  Service: Urology;  Laterality: Right;    ERCP N/A 2021    Procedure: ERCP (ENDOSCOPIC RETROGRADE CHOLANGIOPANCREATOGRAPHY);  Surgeon: Jayjay Rivera MD;  Location[facility]O [facility]  Service: Endoscopy;  Laterality: N/A;    ERCP N/A 3/4/2022    Procedure: ERCP (ENDOSCOPIC RETROGRADE CHOLANGIOPANCREATOGRAPHY);  Surgeon: Roby Virgen MD;  Location[facility]  Service: Endoscopy;  Laterality: N/A;    EYE SURGERY      HYSTERECTOMY  1996    LITHOTRIPSY      LIVER BIOPSY      carcinoid    URETEROSCOPY Right 10/10/2019    Procedure: URETEROSCOPY;  Surgeon: Gen Isbell MD;  Location: [facility] OR;  Service: Urology;  Laterality: Right;    UTERINE FIBROID SURGERY         Review of patient's allergies indicates:   Allergen Reactions    Contrast media Hives, Itching and Swelling    Epinephrine Anaphylaxis     Can cause  a Carcinoid Crisis    Ibuprofen Hives, Itching and Swelling    Zofran [ondansetron hcl] Itching     And multiple other reactions    Iodinated contrast media     Sulfa (sulfonamide antibiotics) Hives, Itching and Swelling       No current facility-administered medications on file prior to encounter.     Current Outpatient Medications on File Prior to Encounter   Medication Sig    alcohol swabs (BD  ALCOHOL SWABS) PadM Apply 1 each topically as needed.    amLODIPine (NORVASC) 2.5 MG tablet Take 1 tablet (2.5 mg total) by mouth once daily.    atorvastatin (LIPITOR) 40 MG tablet Take 1 tablet (40 mg total) by mouth every evening.    BLOOD PRESSURE CUFF Misc 1 each by Misc.(Non-Drug; Combo Route) route once daily.    cyanocobalamin (VITAMIN B-12) 1000 MCG tablet Take 1 tablet (1,000 mcg total) by mouth once daily.    losartan (COZAAR) 100 MG tablet Take 1 tablet (100 mg total) by mouth once daily.    magnesium oxide (MAG-OX) 400 mg (241.3 mg magnesium) tablet Take 1 tablet (400 mg total) by mouth 2 (two) times daily.    meclizine (ANTIVERT) 25 mg tablet TAKE 1 TABLET(25 MG) BY MOUTH THREE TIMES DAILY AS NEEDED FOR DIZZINESS (Patient taking differently: Take 25 mg by mouth 3 (three) times daily as needed for Dizziness.)    metoprolol tartrate (LOPRESSOR) 25 MG tablet TAKE 1 TABLET TWICE DAILY (Patient taking differently: Take 25 mg by mouth 2 (two) times daily.)    oxyCODONE (ROXICODONE) 15 MG Tab Take 15 mg by mouth every 4 (four) hours as needed (chronic abdominal pain, malignancy related).     Family History       Problem Relation (Age of Onset)    Alzheimer's disease Father    Cancer Mother    No Known Problems Son, Son, Son, Son    Stroke Sister          Tobacco Use    Smoking status: Never Smoker    Smokeless tobacco: Never Used   Substance and Sexual Activity    Alcohol use: No     Alcohol/week: 0.0 standard drinks    Drug use: No    Sexual activity: Not Currently     Review of Systems   Constitutional:  Positive for appetite change. Negative for chills and fever.   HENT:  Negative for congestion, trouble swallowing and voice change.    Eyes:  Negative for photophobia and visual disturbance.   Respiratory:  Negative for cough, chest tightness, shortness of breath and wheezing.    Cardiovascular:  Negative for chest pain, palpitations and leg swelling.   Gastrointestinal:  Positive for abdominal pain,  diarrhea and nausea. Negative for abdominal distention and vomiting.   Endocrine: Negative for polyphagia and polyuria.   Genitourinary:  Positive for dysuria and frequency. Negative for decreased urine volume, difficulty urinating and hematuria.   Musculoskeletal:  Negative for arthralgias, back pain and gait problem.   Skin:  Negative for color change.   Neurological:  Negative for dizziness, syncope, speech difficulty, weakness, light-headedness, numbness and headaches.   Psychiatric/Behavioral:  Negative for agitation, behavioral problems and confusion.    Objective:     Vital Signs (Most Recent):  Temp: 98.5 °F (36.9 °C) (03/28/22 0005)  Pulse: 70 (03/28/22 0005)  Resp: 18 (03/28/22 0126)  BP: (!) 186/81 (03/28/22 0005)  SpO2: 96 % (03/28/22 0005)   Vital Signs (24h Range):  Temp:  [98.5 °F (36.9 °C)-98.7 °F (37.1 °C)] 98.5 °F (36.9 °C)  Pulse:  [68-78] 70  Resp:  [16-20] 18  SpO2:  [96 %-99 %] 96 %  BP: (148-189)/(65-81) 186/81     Weight: 78.5 kg (173 lb)  Body mass index is 27.92 kg/m².    Physical Exam  Vitals and nursing note reviewed.   Constitutional:       General: She is not in acute distress.     Appearance: She is well-developed. She is obese.   HENT:      Head: Normocephalic and atraumatic.      Mouth/Throat:      Pharynx: No oropharyngeal exudate.   Eyes:      Extraocular Movements: Extraocular movements intact.      Conjunctiva/sclera: Conjunctivae normal.   Cardiovascular:      Rate and Rhythm: Normal rate and regular rhythm.      Heart sounds: Normal heart sounds.   Pulmonary:      Effort: Pulmonary effort is normal. No respiratory distress.      Breath sounds: Normal breath sounds. No wheezing.   Abdominal:      General: Bowel sounds are normal. There is no distension.      Palpations: Abdomen is soft.      Tenderness: There is abdominal tenderness (RLQ). There is no guarding or rebound.   Musculoskeletal:         General: No tenderness. Normal range of motion.      Cervical back: Normal range  of motion and neck supple.   Lymphadenopathy:      Cervical: No cervical adenopathy.   Skin:     General: Skin is warm and dry.      Capillary Refill: Capillary refill takes less than 2 seconds.      Findings: No rash.   Neurological:      Mental Status: She is alert and oriented to person, place, and time.      Cranial Nerves: No cranial nerve deficit.      Sensory: No sensory deficit.      Coordination: Coordination normal.   Psychiatric:         Behavior: Behavior normal.         Thought Content: Thought content normal.         Judgment: Judgment normal.           Significant Labs: All pertinent labs within the past 24 hours have been reviewed.  CBC:   Recent Labs   Lab 03/27/22  1516   WBC 4.18   HGB 10.0*   HCT 32.9*        CMP:   Recent Labs   Lab 03/27/22  1516      K 3.6      CO2 24      BUN 16   CREATININE 0.9   CALCIUM 9.6   PROT 7.5   ALBUMIN 3.3*   BILITOT 0.5   ALKPHOS 142*   AST 16   ALT 11   ANIONGAP 10   EGFRNONAA >60.0       Significant Imaging: I have reviewed all pertinent imaging results/findings within the past 24 hours.  US Abdomen Limited  Narrative: EXAMINATION:  US ABDOMEN LIMITED    CLINICAL HISTORY:  RUQ, s/p leslie, gets bile duct stones;.    TECHNIQUE:  Limited ultrasound of the right upper quadrant of the abdomen including pancreas, liver, gallbladder, common bile duct was performed.    COMPARISON:  CT abdomen pelvis 03/27/2022, MRI abdomen 03/02/2022    FINDINGS:  Liver: Normal in size, measuring 14.3 cm with homogeneous echotexture.  1.9 x 2.0 x 1.5 heterogeneous echogenic lesion in the right hepatic lobe in keeping with known hepatic metastatic disease and better characterized on above comparison MRI abdomen.    Gallbladder: The gallbladder is surgically absent.    Biliary system: The common duct is not dilated, measuring 6 mm.  Mild central intrahepatic biliary dilatation.    IVC: Visualized portions appear within normal limits.    Spleen: Normal in size  with a homogeneous echotexture, measuring 7.5 x 3.1 cm.    Pancreas: The visualized portions of pancreas appear normal.    Miscellaneous: No ascites.  Impression: Mildly dilated central intrahepatic bile ducts.  Common bile duct is normal caliber status post cholecystectomy.  No choledocholithiasis.    Right hepatic lobe lesion in keeping with known hepatic metastatic disease, better characterized on above comparison MRI.    Electronically signed by resident: Lon Hanson MD  Date:    03/27/2022  Time:    18:12    Electronically signed by: Gen Ray MD  Date:    03/27/2022  Time:    18:27  CT Abdomen Pelvis  Without Contrast  Narrative: EXAMINATION:  CT ABDOMEN PELVIS WITHOUT CONTRAST    CLINICAL HISTORY:  Abdominal pain, post-op;    TECHNIQUE:  Low dose axial images, sagittal and coronal reformations were obtained from the lung bases to the pubic symphysis.  Oral contrast was not administered.    COMPARISON:  03/11/2022    FINDINGS:  The lung bases are clear.    No pericardial effusion.    The noncontrast appearance of the liver demonstrates areas of parenchymal calcifications in the left hepatic lobe, unchanged.    The biliary ducts do not appear dilated.  The common bile duct measures about 6 mm.    No radiopaque stones seen within the common bile duct.    Cholecystectomy clips seen.    The stomach appears normal.  The pancreas appears normal.    The spleen, bilateral adrenal glands appear normal.    There are bilateral sizable forming kidney calculi.  There is no hydronephrosis or hydroureter, no stones seen within the bilateral ureters or within the bladder.    The abdominal aorta tapers normally, no para-aortic lymphadenopathy.    Mild stool retention.  Prior partial colectomy.  No bowel dilation.  No inflammatory changes of the bowel seen.    5.0 x 2.5 cm partially calcified soft tissue mass in the right mid anterior abdomen region, not significantly changed.    Note that the patency of the mesenteric  vasculature cannot be assessed without the use of IV contrast.    The bladder is nondistended.  The uterus is removed.  The left ovary is not seen, the right ovary appears normal.    No free fluid, no free air.    The inguinal regions appear normal.    The osseous structures demonstrate degenerative changes at the bilateral sacroiliac joints.  No osseous lesions.  Impression: No definite abnormality seen to explain patient's severe pain after ERCP.    Cholecystectomy.    Bilateral numerous nonobstructive kidney calculi    Prior partial colectomy.    Unchanged soft tissue mass within the right lower abdominal quadrant.    Note that the patency of the mesenteric vasculature cannot be assessed on noncontrast images.    Electronically signed by: Renetta Snow MD  Date:    03/27/2022  Time:    15:56     L. AC/yes

## 2025-03-17 ENCOUNTER — PATIENT OUTREACH (OUTPATIENT)
Dept: ADMINISTRATIVE | Facility: HOSPITAL | Age: 75
End: 2025-03-17
Payer: MEDICARE

## 2025-06-16 NOTE — PROGRESS NOTES
Outpatient Care Management  Plan of Care Follow Up Visit    Patient: Patito Domingo  MRN: [id number]  Date of Service: 08/22/2022  Completed by: Marcie Thrasher RN  Referral Date: 08/12/2022    Reason for Visit   Patient presents with    OPCM Chart Review       Brief Summary:   Fu on monthly lanreotide injection      Next steps from previous encounter  Fu on referral to chemo for continued treatment of neuroendocrine tumor lanreotide injection  Fu on possible second opinion for stone extraction or other treatment  Fu possible uro gyn appt and possible palliative care for pain  Fu on pain and wound status  Fu on any symptoms of uti and is pt taking the vit c and menthamine or what about d mannose   Fu on orders for  received by home health    Interventions  Chart reviewed  Reviewed upcoming appt schedule    Assess/review short term goals met       Next steps  Fu on referral to chemo for continued treatment of neuroendocrine tumor lanreotide injection  Fu on possible second opinion for stone extraction or other treatment  Fu possible uro gyn appt and possible palliative care for pain  Fu on pain and wound status  Fu on any symptoms of uti and is pt taking the vit c and menthamine or what about mannose d  Fu on orders for  received by home health        Patient Summary     Involvement of Care:  Do I have permission to speak with other family members about your care?       Patient Reported Labs & Vitals:  1.  Any Patient Reported Labs & Vitals?     2.  Patient Reported Blood Pressure:     3.  Patient Reported Pulse:     4.  Patient Reported Weight (Kg):     5.  Patient Reported Blood Glucose (mg/dl):       Medical and social history was reviewed with patient and/or caregiver.     Clinical Assessment     Reviewed and provided basic information on available community resources for mental health, transportation, wellness resources, and palliative care programs with patient and/or caregiver.  Post op patient reports SOB with exertion that began on Friday. Also reports noting a fluctuating HR recently. Advised, per protocol and verbalizes understanding.    Reason for Disposition   Major surgery in the past month    Additional Information   Negative: SEVERE difficulty breathing (e.g., struggling for each breath, speaks in single words)   Negative: [1] Breathing stopped AND [2] hasn't returned   Negative: Choking on something   Negative: Bluish (or gray) lips or face now   Negative: Difficult to awaken or acting confused (e.g., disoriented, slurred speech)   Negative: Passed out (e.g., fainted, lost consciousness, blacked out and was not responding)   Negative: Wheezing started suddenly after medicine, an allergic food or bee sting   Negative: Stridor (harsh sound while breathing in)   Negative: Slow, shallow and weak breathing   Negative: Sounds like a life-threatening emergency to the triager    Protocols used: Breathing Difficulty-A-AH         Complex Care Plan     Care plan was discussed and completed today with input from patient and/or caregiver.    Patient Instructions     Instructions were provided via the "[employer] patient resources and are available for the patient to view on the patient portal.      Todays OPCM Self-Management Care Plan was developed with the patients/caregivers input and was based on identified barriers from todays assessment.  Goals were written today with the patient/caregiver and the patient has agreed to work towards these goals to improve his/her overall well-being. Patient verbalized understanding of the care plan, goals, and all of today's instructions. Encouraged patient/caregiver to communicate with his/her physician and health care team about health conditions and the treatment plan.  Provided my contact information today and encouraged patient/caregiver to call me with any questions as needed.
